# Patient Record
Sex: MALE | Race: WHITE | NOT HISPANIC OR LATINO
[De-identification: names, ages, dates, MRNs, and addresses within clinical notes are randomized per-mention and may not be internally consistent; named-entity substitution may affect disease eponyms.]

---

## 2017-02-17 ENCOUNTER — APPOINTMENT (OUTPATIENT)
Dept: NEUROLOGY | Facility: CLINIC | Age: 67
End: 2017-02-17

## 2017-02-17 VITALS
WEIGHT: 237 LBS | DIASTOLIC BLOOD PRESSURE: 72 MMHG | SYSTOLIC BLOOD PRESSURE: 140 MMHG | BODY MASS INDEX: 33.18 KG/M2 | HEIGHT: 71 IN | HEART RATE: 92 BPM

## 2017-03-17 ENCOUNTER — APPOINTMENT (OUTPATIENT)
Dept: NEUROLOGY | Facility: CLINIC | Age: 67
End: 2017-03-17

## 2017-03-17 VITALS
BODY MASS INDEX: 33.18 KG/M2 | HEIGHT: 71 IN | SYSTOLIC BLOOD PRESSURE: 130 MMHG | WEIGHT: 237 LBS | DIASTOLIC BLOOD PRESSURE: 80 MMHG | HEART RATE: 75 BPM

## 2017-04-26 ENCOUNTER — APPOINTMENT (OUTPATIENT)
Dept: NEUROLOGY | Facility: CLINIC | Age: 67
End: 2017-04-26

## 2017-04-26 VITALS
WEIGHT: 240 LBS | HEIGHT: 71 IN | HEART RATE: 90 BPM | BODY MASS INDEX: 33.6 KG/M2 | DIASTOLIC BLOOD PRESSURE: 88 MMHG | SYSTOLIC BLOOD PRESSURE: 138 MMHG

## 2017-04-27 ENCOUNTER — OTHER (OUTPATIENT)
Age: 67
End: 2017-04-27

## 2017-05-19 ENCOUNTER — APPOINTMENT (OUTPATIENT)
Dept: NEUROLOGY | Facility: CLINIC | Age: 67
End: 2017-05-19

## 2017-05-19 VITALS
BODY MASS INDEX: 33.6 KG/M2 | HEART RATE: 97 BPM | DIASTOLIC BLOOD PRESSURE: 76 MMHG | SYSTOLIC BLOOD PRESSURE: 140 MMHG | HEIGHT: 71 IN | WEIGHT: 240 LBS

## 2017-05-22 ENCOUNTER — OTHER (OUTPATIENT)
Age: 67
End: 2017-05-22

## 2017-06-09 ENCOUNTER — APPOINTMENT (OUTPATIENT)
Dept: NEUROLOGY | Facility: CLINIC | Age: 67
End: 2017-06-09

## 2017-06-09 VITALS
HEIGHT: 71 IN | WEIGHT: 240 LBS | HEART RATE: 76 BPM | BODY MASS INDEX: 33.6 KG/M2 | DIASTOLIC BLOOD PRESSURE: 83 MMHG | SYSTOLIC BLOOD PRESSURE: 179 MMHG

## 2017-06-24 ENCOUNTER — EMERGENCY (EMERGENCY)
Facility: HOSPITAL | Age: 67
LOS: 1 days | Discharge: HOME | End: 2017-06-24
Attending: EMERGENCY MEDICINE | Admitting: EMERGENCY MEDICINE
Payer: MEDICARE

## 2017-06-24 VITALS
TEMPERATURE: 97 F | DIASTOLIC BLOOD PRESSURE: 103 MMHG | HEART RATE: 85 BPM | RESPIRATION RATE: 16 BRPM | OXYGEN SATURATION: 97 % | SYSTOLIC BLOOD PRESSURE: 192 MMHG

## 2017-06-24 VITALS
OXYGEN SATURATION: 99 % | TEMPERATURE: 98 F | DIASTOLIC BLOOD PRESSURE: 109 MMHG | SYSTOLIC BLOOD PRESSURE: 188 MMHG | HEART RATE: 93 BPM | RESPIRATION RATE: 18 BRPM

## 2017-06-24 DIAGNOSIS — Z95.9 PRESENCE OF CARDIAC AND VASCULAR IMPLANT AND GRAFT, UNSPECIFIED: Chronic | ICD-10-CM

## 2017-06-24 LAB
ALBUMIN SERPL ELPH-MCNC: 4.1 G/DL — SIGNIFICANT CHANGE UP (ref 3.3–5)
ALP SERPL-CCNC: 78 U/L — SIGNIFICANT CHANGE UP (ref 40–120)
ALT FLD-CCNC: 14 U/L — SIGNIFICANT CHANGE UP (ref 4–41)
AST SERPL-CCNC: 23 U/L — SIGNIFICANT CHANGE UP (ref 4–40)
BASE EXCESS BLDV CALC-SCNC: 1.3 MMOL/L — SIGNIFICANT CHANGE UP
BASOPHILS # BLD AUTO: 0.03 K/UL — SIGNIFICANT CHANGE UP (ref 0–0.2)
BASOPHILS NFR BLD AUTO: 0.2 % — SIGNIFICANT CHANGE UP (ref 0–2)
BILIRUB SERPL-MCNC: 0.4 MG/DL — SIGNIFICANT CHANGE UP (ref 0.2–1.2)
BLOOD GAS VENOUS - CREATININE: 2.16 MG/DL — HIGH (ref 0.5–1.3)
BUN SERPL-MCNC: 40 MG/DL — HIGH (ref 7–23)
CALCIUM SERPL-MCNC: 9.8 MG/DL — SIGNIFICANT CHANGE UP (ref 8.4–10.5)
CHLORIDE BLDV-SCNC: 104 MMOL/L — SIGNIFICANT CHANGE UP (ref 96–108)
CHLORIDE SERPL-SCNC: 100 MMOL/L — SIGNIFICANT CHANGE UP (ref 98–107)
CK MB BLD-MCNC: 1.7 — SIGNIFICANT CHANGE UP (ref 0–2.5)
CK MB BLD-MCNC: 3.33 NG/ML — SIGNIFICANT CHANGE UP (ref 1–6.6)
CK SERPL-CCNC: 199 U/L — SIGNIFICANT CHANGE UP (ref 30–200)
CO2 SERPL-SCNC: 20 MMOL/L — LOW (ref 22–31)
CREAT SERPL-MCNC: 2.43 MG/DL — HIGH (ref 0.5–1.3)
EOSINOPHIL # BLD AUTO: 0.45 K/UL — SIGNIFICANT CHANGE UP (ref 0–0.5)
EOSINOPHIL NFR BLD AUTO: 3.7 % — SIGNIFICANT CHANGE UP (ref 0–6)
GAS PNL BLDV: 138 MMOL/L — SIGNIFICANT CHANGE UP (ref 136–146)
GLUCOSE BLDV-MCNC: 101 — HIGH (ref 70–99)
GLUCOSE SERPL-MCNC: 97 MG/DL — SIGNIFICANT CHANGE UP (ref 70–99)
HCO3 BLDV-SCNC: 25 MMOL/L — SIGNIFICANT CHANGE UP (ref 20–27)
HCT VFR BLD CALC: 38.8 % — LOW (ref 39–50)
HCT VFR BLDV CALC: 44.2 % — SIGNIFICANT CHANGE UP (ref 39–51)
HGB BLD-MCNC: 13.1 G/DL — SIGNIFICANT CHANGE UP (ref 13–17)
HGB BLDV-MCNC: 14.4 G/DL — SIGNIFICANT CHANGE UP (ref 13–17)
IMM GRANULOCYTES NFR BLD AUTO: 0.2 % — SIGNIFICANT CHANGE UP (ref 0–1.5)
LACTATE BLDV-MCNC: 1.2 MMOL/L — SIGNIFICANT CHANGE UP (ref 0.5–2)
LYMPHOCYTES # BLD AUTO: 28.6 % — SIGNIFICANT CHANGE UP (ref 13–44)
LYMPHOCYTES # BLD AUTO: 3.51 K/UL — HIGH (ref 1–3.3)
MCHC RBC-ENTMCNC: 31.2 PG — SIGNIFICANT CHANGE UP (ref 27–34)
MCHC RBC-ENTMCNC: 33.8 % — SIGNIFICANT CHANGE UP (ref 32–36)
MCV RBC AUTO: 92.4 FL — SIGNIFICANT CHANGE UP (ref 80–100)
MONOCYTES # BLD AUTO: 0.65 K/UL — SIGNIFICANT CHANGE UP (ref 0–0.9)
MONOCYTES NFR BLD AUTO: 5.3 % — SIGNIFICANT CHANGE UP (ref 2–14)
NEUTROPHILS # BLD AUTO: 7.62 K/UL — HIGH (ref 1.8–7.4)
NEUTROPHILS NFR BLD AUTO: 62 % — SIGNIFICANT CHANGE UP (ref 43–77)
PCO2 BLDV: 42 MMHG — SIGNIFICANT CHANGE UP (ref 41–51)
PH BLDV: 7.4 PH — SIGNIFICANT CHANGE UP (ref 7.32–7.43)
PLATELET # BLD AUTO: 273 K/UL — SIGNIFICANT CHANGE UP (ref 150–400)
PMV BLD: 11.4 FL — SIGNIFICANT CHANGE UP (ref 7–13)
PO2 BLDV: 38 MMHG — SIGNIFICANT CHANGE UP (ref 35–40)
POTASSIUM BLDV-SCNC: 3.8 MMOL/L — SIGNIFICANT CHANGE UP (ref 3.4–4.5)
POTASSIUM SERPL-MCNC: 4.1 MMOL/L — SIGNIFICANT CHANGE UP (ref 3.5–5.3)
POTASSIUM SERPL-SCNC: 4.1 MMOL/L — SIGNIFICANT CHANGE UP (ref 3.5–5.3)
PROT SERPL-MCNC: 8.2 G/DL — SIGNIFICANT CHANGE UP (ref 6–8.3)
RBC # BLD: 4.2 M/UL — SIGNIFICANT CHANGE UP (ref 4.2–5.8)
RBC # FLD: 13.2 % — SIGNIFICANT CHANGE UP (ref 10.3–14.5)
SAO2 % BLDV: 71.2 % — SIGNIFICANT CHANGE UP (ref 60–85)
SODIUM SERPL-SCNC: 138 MMOL/L — SIGNIFICANT CHANGE UP (ref 135–145)
TROPONIN T SERPL-MCNC: < 0.06 NG/ML — SIGNIFICANT CHANGE UP (ref 0–0.06)
WBC # BLD: 12.28 K/UL — HIGH (ref 3.8–10.5)
WBC # FLD AUTO: 12.28 K/UL — HIGH (ref 3.8–10.5)

## 2017-06-24 PROCEDURE — 99285 EMERGENCY DEPT VISIT HI MDM: CPT

## 2017-06-24 RX ORDER — SODIUM CHLORIDE 9 MG/ML
500 INJECTION INTRAMUSCULAR; INTRAVENOUS; SUBCUTANEOUS ONCE
Qty: 0 | Refills: 0 | Status: COMPLETED | OUTPATIENT
Start: 2017-06-24 | End: 2017-06-24

## 2017-06-24 RX ORDER — IBUPROFEN 200 MG
600 TABLET ORAL ONCE
Qty: 0 | Refills: 0 | Status: COMPLETED | OUTPATIENT
Start: 2017-06-24 | End: 2017-06-24

## 2017-06-24 RX ADMIN — SODIUM CHLORIDE 1000 MILLILITER(S): 9 INJECTION INTRAMUSCULAR; INTRAVENOUS; SUBCUTANEOUS at 20:24

## 2017-06-24 RX ADMIN — SODIUM CHLORIDE 1000 MILLILITER(S): 9 INJECTION INTRAMUSCULAR; INTRAVENOUS; SUBCUTANEOUS at 18:42

## 2017-06-24 RX ADMIN — Medication 600 MILLIGRAM(S): at 20:24

## 2017-06-24 NOTE — ED PROVIDER NOTE - PHYSICAL EXAMINATION
Tad att: nad, aaox3, perrl, eomi, cn2-12 intact, lue resting tremor, ctabl, rrr, s1s2, abd soft ntnd, neg le edema, truncal ataxia

## 2017-06-24 NOTE — ED PROVIDER NOTE - OBJECTIVE STATEMENT
66 yo M with PMH MI (stent to RCA), parkinson's disease, T2DM, HTN presenting after being seen in urgent care for episodes of shortness of breath, associated with headaches, dry mouth and diaphoresis, these episodes have occurred intermittently over the past week, patient states that he recently started seeing a motion specialist for his parkinson's, who took the patient off of sinemet and amantadine, pt is only on trihexyphenidyl currently, sinemet was not stopped abruptly. Patient has no rigidity, alterations in mental status, no fevers at home, 68 yo M with PMH MI (stent to RCA), parkinson's disease, T2DM, HTN presenting after being seen in urgent care for episodes of shortness of breath, associated with headaches, dry mouth and diaphoresis, these episodes have occurred intermittently over the past week, patient states that he recently started seeing a motion specialist for his parkinson's, who took the patient off of sinemet and amantadine, pt is only on trihexyphenidyl currently, sinemet was not stopped abruptly. Patient does describe some more difficulty walking due to feeling off balance, patient has no rigidity, alterations in mental status, no fevers at home, 66 yo M with PMH MI (stent to RCA), parkinson's disease, T2DM, HTN presenting after being seen in urgent care for episodes of shortness of breath, associated with headaches, dry mouth and diaphoresis, these episodes have occurred intermittently over the past week, patient states that he recently started seeing a motion specialist for his parkinson's, who took the patient off of sinemet and amantadine, pt is only on trihexyphenidyl currently, sinemet was not stopped abruptly. Patient does describe some more difficulty walking due to feeling off balance, patient has no rigidity, alterations in mental status, no fevers at home,     Tad att: 67M h/o htn. dm, cad mi 1 stent, parkinsons with recent medication changes sent from [facility] for lighthead, headache, dry mouth. One month ago patient's movement specialist Dr. Sultana Jean-Baptiste discontinued cinemet and amantidine in order to continue with trihexyphenidyl. Patient concerned he is having side effects of medication which he lists as headaches, diffuse, intermitt, worse with prolonged standing. Dry mouth. Generalized malaise. Feeling more offbalance, neg falls. Denies cp, sob, loc, confusion, focal arm or leg weakness or paresthesia. Denies fever, uri, cough, dysuria. Called Dr. Jean-Baptiste today, not in office, pt stopped by [facility].

## 2017-06-24 NOTE — ED PROVIDER NOTE - PROGRESS NOTE DETAILS
Tad att: Patient rec'd 500cc fluid with ha improvement. Electrolytes nl. EKG nsr nl axis neg st abnromality, will add trops and inquire further re sob and whether patient has exertional symptoms. Increasing ataxia without other neuro findings setting of amantidine and cinemet discontinuation. Will d/w neuro Tad att: Per Neuro agreed, medication side effects, keep regimen, f/up Baljeet and Estefanía. Socrates Duncan, PGY1: Pt agreed to follow up with the neurologist and will call tomorrow for an earlier appointment. Will discharge with same home meds. Received IVF, ambulating well. Dyspnea noted on triage note was when walking to [facility], resolved with rest. Denies chest pain or diaphoresis. Pt states this is his baseline with exertion. Discussed importance of staying active and improving his diet.

## 2017-06-24 NOTE — ED PROVIDER NOTE - MEDICAL DECISION MAKING DETAILS
66 yo M with PMH MI (stent to RCA), parkinson's disease, T2DM, HTN presenting after being seen in urgent care for episodes of shortness of breath, associated with headaches, dry mouth and diaphoresis, check EKG, CBC, CMP, CE,

## 2017-06-24 NOTE — ED PROVIDER NOTE - PMH
Autoimmune disease  started on prednisone on 12/23/15 for "inflammation" but does not know diagnosis  CAD (coronary artery disease)    Diabetes Mellitus, Type 2    Gastroesophageal reflux disease without esophagitis  diet controlled  Hypercholesterolemia    Hypertension    Insomnia    Leg swelling  left leg, improved after starting furosemide 3x/ week  Nocturia  x2-3  Osteoarthritis    Panic attacks    Parkinson disease    Shoulder pain, left    Urinary frequency    Urinary incontinence    Vertigo  not improved with meclizine in past

## 2017-06-24 NOTE — ED ADULT TRIAGE NOTE - CHIEF COMPLAINT QUOTE
Pt. sent in by Urgent care center for possible adverse reaction to Trihexyphenidyl. Hx of Parkinson's, recently had medication changes. Admits to head "pressure", increased shaking, bilateral weakness, and acute visual changes over the past week. Denies chest pain, fever, chills, vomiting. Pt. sent in by Urgent care center for possible adverse reaction to Trihexyphenidyl. Hx of Parkinson's, recently had medication changes. Admits to head "pressure", increased shaking, bilateral weakness, and acute visual changes over the past week, sent in to r/o extrapyramidal reaction or neuroleptic malignant syndrome. Denies chest pain, fever, chills, vomiting.

## 2017-06-24 NOTE — ED ADULT NURSE NOTE - CHIEF COMPLAINT QUOTE
Pt. sent in by Urgent care center for possible adverse reaction to Trihexyphenidyl. Hx of Parkinson's, recently had medication changes. Admits to head "pressure", increased shaking, bilateral weakness, and acute visual changes over the past week, sent in to r/o extrapyramidal reaction or neuroleptic malignant syndrome. Denies chest pain, fever, chills, vomiting.

## 2017-06-24 NOTE — ED PROVIDER NOTE - ATTENDING CONTRIBUTION TO CARE
Dr. Mishra: I have personally seen and examined this patient at the bedside. I have fully participated in the care of this patient. I have reviewed all pertinent clinical information, including history, physical exam, plan and the Resident's note and agree except as noted. HPI above as by me. PE above as by me. DDX electrolyte disturance, dehydration, medication side effect PLAN ekg ce x 1, cbc, cmp, normal saline 500 cc aliquots, dw neuro

## 2017-06-25 ENCOUNTER — EMERGENCY (EMERGENCY)
Facility: HOSPITAL | Age: 67
LOS: 1 days | Discharge: HOME | End: 2017-06-25
Attending: EMERGENCY MEDICINE | Admitting: EMERGENCY MEDICINE
Payer: COMMERCIAL

## 2017-06-25 DIAGNOSIS — Z95.9 PRESENCE OF CARDIAC AND VASCULAR IMPLANT AND GRAFT, UNSPECIFIED: Chronic | ICD-10-CM

## 2017-06-25 PROCEDURE — 99284 EMERGENCY DEPT VISIT MOD MDM: CPT | Mod: 25,GC

## 2017-06-25 PROCEDURE — 93010 ELECTROCARDIOGRAM REPORT: CPT | Mod: NC

## 2017-06-26 VITALS
DIASTOLIC BLOOD PRESSURE: 71 MMHG | RESPIRATION RATE: 18 BRPM | OXYGEN SATURATION: 100 % | HEART RATE: 92 BPM | TEMPERATURE: 98 F | SYSTOLIC BLOOD PRESSURE: 174 MMHG

## 2017-06-26 VITALS
SYSTOLIC BLOOD PRESSURE: 212 MMHG | OXYGEN SATURATION: 100 % | HEART RATE: 83 BPM | RESPIRATION RATE: 18 BRPM | DIASTOLIC BLOOD PRESSURE: 108 MMHG

## 2017-06-26 RX ORDER — DIAZEPAM 5 MG
1 TABLET ORAL
Qty: 6 | Refills: 0
Start: 2017-06-26 | End: 2017-06-29

## 2017-06-26 NOTE — ED PROVIDER NOTE - OBJECTIVE STATEMENT
67M presents to the ED with difficulty sleeping, dull ache to his head, dry mouth, mild sweating.  Pt accompanied by son to the ED.  No fever, no chills, no weakness, no chest pain, no difficulty swallowing, no change in vision. Pt was evaluated for the same yesterday in the ED and told to continue all medications - symptoms deemed to be from trihexephenydryl.  Pt has an appointment with neurologist Rebeca in September (previous neurologist Dr. Corcoran).  Pt has had side effects similar to this for many months.

## 2017-06-26 NOTE — ED PROVIDER NOTE - PROGRESS NOTE DETAILS
neurology called. arranged for outpt followup. Dr. José's office. Patient to call for urgent appointment notifying them of ER visit to expedite apptment.

## 2017-06-26 NOTE — ED ADULT TRIAGE NOTE - CHIEF COMPLAINT QUOTE
dizziness/leg pain    pt c/o increased tremors, general feeling of malaise, diff sleeping, "room is spinning",  leg pain. has hx of parkinsons...was seen in ed for same complaints...sent by urgent care for reaction to Trihexyphenidyl ... recently had changes in meds

## 2017-06-26 NOTE — ED ADULT NURSE NOTE - CHIEF COMPLAINT QUOTE
dizziness/leg pain. pt c/o increased tremors, general feeling of malaise, diff sleeping, "room is spinning",  leg pain. has hx of parkinsons...was seen in ed for same complaints...sent by urgent care for reaction to Trihexyphenidyl ... recently had changes in meds

## 2017-06-26 NOTE — ED ADULT NURSE NOTE - OBJECTIVE STATEMENT
Pt presents to ED R#9 Aox4, in NAD, c/o worsening B/L arm tremors x2 days. Has been having L arm tremors since dx with Parkinsons, RUE tremors new onset yesterday and worsening today. Also c/o worsening  LE swelling and pain, today unable to ambulate without assistance due to swelling and discomfort. Also c/o inabilitiy to sleep due to head "fullness and pressure" denies HA. States recently started on new medication trihexphenidyl, believes symptoms may be related. Hypertensive; MD aware. Denies abdominal pain/ N/V/ CP/ SOB/ fevers/ chills/ urinary complaints/ cough/ other acute complaints. Will continue to monitor.

## 2017-06-26 NOTE — ED PROVIDER NOTE - NEUROLOGICAL LEVEL OF CONSCIOUSNESS
b/l cog-wheel rigidity UE. no dystonia. no tardive. no clonus. FROM b/l LE. sensation intact. no tongue fasciculation. neck supple.

## 2017-06-26 NOTE — ED PROVIDER NOTE - PHYSICAL EXAMINATION
ATTENDING PHYSICAL EXAM DR. HERNÁNDEZ ***GEN - NAD; well appearing; A+O x3 ***HEAD - NC/AT ***EYES/NOSE - PERRL, EOMI, mucous membranes moist, no discharge   ***PULMONARY - CTA b/l, symmetric breath sounds. ***CARDIAC -s1s2, RRR, no M,G,R  ***ABDOMEN - +BS, ND, NT, soft, no guarding, no rebound, no masses   ***BACK - no CVA tenderness, Normal  spine ***EXTREMITIES - symmetric pulses, 2+ dp, capillary refill < 2 seconds, no clubbing, no cyanosis, no edema ***SKIN - no rash or bruising   ***NEUROLOGIC - alert, CN 2-12 intact, reflexes nl, sensation nl, coordination nl, finger to nose nl, motor 5/5 RUE/LUE/RLE/LLE/EHL/Plantar flexion, no pronator drift, resting tremor, no rigidity, no clonus ATTENDING PHYSICAL EXAM DR. HERNÁNDEZ ***GEN - NAD; well appearing; A+O x3 ***HEAD - NC/AT ***EYES/NOSE - PERRL, EOMI, mucous membranes moist, no discharge   ***PULMONARY - CTA b/l, symmetric breath sounds. ***CARDIAC -s1s2, RRR, no M,G,R  ***ABDOMEN - +BS, ND, NT, soft, no guarding, no rebound, no masses   ***BACK - no CVA tenderness, Normal  spine ***EXTREMITIES - symmetric pulses, 2+ dp, capillary refill < 2 seconds, no clubbing, no cyanosis, no edema ***SKIN - no rash or bruising   ***NEUROLOGIC - alert, CN 2-12 intact, reflexes nl, sensation nl, coordination nl, finger to nose nl, motor 5/5 RUE/LUE/RLE/LLE/EHL/Plantar flexion, no pronator drift, resting tremor, no severe rigidity, +cogwheel, no clonus

## 2017-06-29 ENCOUNTER — APPOINTMENT (OUTPATIENT)
Dept: NEUROLOGY | Facility: CLINIC | Age: 67
End: 2017-06-29

## 2017-06-29 DIAGNOSIS — G20 PARKINSON'S DISEASE: ICD-10-CM

## 2017-07-18 ENCOUNTER — APPOINTMENT (OUTPATIENT)
Dept: NEUROLOGY | Facility: CLINIC | Age: 67
End: 2017-07-18

## 2017-07-18 VITALS
HEART RATE: 78 BPM | SYSTOLIC BLOOD PRESSURE: 161 MMHG | DIASTOLIC BLOOD PRESSURE: 74 MMHG | HEIGHT: 71 IN | BODY MASS INDEX: 34.16 KG/M2 | WEIGHT: 244 LBS

## 2017-09-27 ENCOUNTER — MEDICATION RENEWAL (OUTPATIENT)
Age: 67
End: 2017-09-27

## 2017-10-20 ENCOUNTER — APPOINTMENT (OUTPATIENT)
Dept: NEUROLOGY | Facility: CLINIC | Age: 67
End: 2017-10-20
Payer: MEDICARE

## 2017-10-20 VITALS — DIASTOLIC BLOOD PRESSURE: 82 MMHG | HEART RATE: 78 BPM | SYSTOLIC BLOOD PRESSURE: 157 MMHG

## 2017-10-20 PROCEDURE — 99214 OFFICE O/P EST MOD 30 MIN: CPT

## 2017-10-20 RX ORDER — PAROXETINE HYDROCHLORIDE 20 MG/1
20 TABLET, FILM COATED ORAL DAILY
Qty: 90 | Refills: 1 | Status: DISCONTINUED | COMMUNITY
Start: 2017-06-29 | End: 2017-10-20

## 2018-02-21 ENCOUNTER — APPOINTMENT (OUTPATIENT)
Dept: NEUROLOGY | Facility: CLINIC | Age: 68
End: 2018-02-21
Payer: MEDICARE

## 2018-02-21 VITALS
HEART RATE: 83 BPM | BODY MASS INDEX: 34.3 KG/M2 | HEIGHT: 71 IN | DIASTOLIC BLOOD PRESSURE: 79 MMHG | SYSTOLIC BLOOD PRESSURE: 170 MMHG | WEIGHT: 245 LBS

## 2018-02-21 PROCEDURE — 99214 OFFICE O/P EST MOD 30 MIN: CPT

## 2018-05-02 ENCOUNTER — APPOINTMENT (OUTPATIENT)
Dept: NEUROLOGY | Facility: CLINIC | Age: 68
End: 2018-05-02
Payer: MEDICARE

## 2018-05-02 VITALS — HEART RATE: 83 BPM | DIASTOLIC BLOOD PRESSURE: 83 MMHG | SYSTOLIC BLOOD PRESSURE: 158 MMHG | HEIGHT: 71 IN

## 2018-05-02 PROCEDURE — 99214 OFFICE O/P EST MOD 30 MIN: CPT

## 2018-05-22 ENCOUNTER — MEDICATION RENEWAL (OUTPATIENT)
Age: 68
End: 2018-05-22

## 2018-09-28 ENCOUNTER — APPOINTMENT (OUTPATIENT)
Dept: NEUROLOGY | Facility: CLINIC | Age: 68
End: 2018-09-28
Payer: MEDICARE

## 2018-09-28 PROCEDURE — 99214 OFFICE O/P EST MOD 30 MIN: CPT

## 2018-10-01 ENCOUNTER — OTHER (OUTPATIENT)
Age: 68
End: 2018-10-01

## 2018-10-03 ENCOUNTER — FORM ENCOUNTER (OUTPATIENT)
Age: 68
End: 2018-10-03

## 2018-10-04 ENCOUNTER — APPOINTMENT (OUTPATIENT)
Dept: MRI IMAGING | Facility: HOSPITAL | Age: 68
End: 2018-10-04
Payer: MEDICARE

## 2018-10-04 ENCOUNTER — OUTPATIENT (OUTPATIENT)
Dept: OUTPATIENT SERVICES | Facility: HOSPITAL | Age: 68
LOS: 1 days | End: 2018-10-04
Payer: MEDICARE

## 2018-10-04 DIAGNOSIS — D32.9 BENIGN NEOPLASM OF MENINGES, UNSPECIFIED: ICD-10-CM

## 2018-10-04 DIAGNOSIS — Z95.9 PRESENCE OF CARDIAC AND VASCULAR IMPLANT AND GRAFT, UNSPECIFIED: Chronic | ICD-10-CM

## 2018-10-04 DIAGNOSIS — G20 PARKINSON'S DISEASE: ICD-10-CM

## 2018-10-04 PROCEDURE — 70551 MRI BRAIN STEM W/O DYE: CPT

## 2018-10-04 PROCEDURE — 70551 MRI BRAIN STEM W/O DYE: CPT | Mod: 26

## 2018-10-30 ENCOUNTER — MEDICATION RENEWAL (OUTPATIENT)
Age: 68
End: 2018-10-30

## 2019-02-05 ENCOUNTER — APPOINTMENT (OUTPATIENT)
Dept: NEUROLOGY | Facility: CLINIC | Age: 69
End: 2019-02-05
Payer: MEDICARE

## 2019-02-05 PROCEDURE — 99214 OFFICE O/P EST MOD 30 MIN: CPT

## 2019-02-05 NOTE — HISTORY OF PRESENT ILLNESS
[FreeTextEntry1] : At that visit appeared that Artane was helping and he felt better off Sinemet (which he stopped, as planned over 24 hr before the visit).  Because he would like to try to stay on one medication instead of 2, and he felt better without Sinemet.  I tried titrating up Artane and stopped Sinemet. After more discussion, it seems clear that sinemet did help\par \par 1. "Some days I am doing great" Taking sinemet 2 -2 - 2 tablet (3x/day, every 6 hours), and artane 2mg 3x/day (tried to taper off. tremor got worse), which worked well. Gets very tired about an hour after taking the meds. Feels good in the morning. No falls, uses walker\par 2. No hallucinations. Anxiety continues to be an issue, when he goes out.  He was on Paxil in the past (started in rehab) but stopped it because he did not think he needed it\par 3. Sleeps fitfully, no RBD. \par 4. Was following with neurosurgery for a meningioma, MRI in October 2018 showed stable right meningioma\par 5. Going to PT

## 2019-02-05 NOTE — DISCUSSION/SUMMARY
[FreeTextEntry1] : 1) Parkinsonism, much improved on sinemet and artane. \par 2) Anxiety and depression\par \par 1. Continue sinemet and artane. Can try using sinemet ER. \par 2. For anxiety, monitor, would recommend medications, he wants to wait. \par 3. PT/OT\par 4. Meningioma is stable, repeat MRI in 2 years

## 2019-02-05 NOTE — PHYSICAL EXAM
[Person] : oriented to person [Place] : oriented to place [Time] : oriented to time [Short Term Intact] : short term memory intact [Registration Intact] : recent registration memory intact [Motor Handedness Right-Handed] : the patient is right hand dominant [FreeTextEntry4] : 2/3 delayed recall, 3/3 with hints [FreeTextEntry1] : Improved masked face, decreased blink, voice ok. EOMI, no SWJ. Tone mildly increased in neck, and left>right arm, and left leg. Parkinsonian tremor left arm and left leg. RAHM and foot slowed on left>right. Able to get up from chair without arms. Gait with improved stride, multistep turn. Pull test negative.\par

## 2019-03-26 ENCOUNTER — MEDICATION RENEWAL (OUTPATIENT)
Age: 69
End: 2019-03-26

## 2019-06-20 ENCOUNTER — MEDICATION RENEWAL (OUTPATIENT)
Age: 69
End: 2019-06-20

## 2019-06-25 ENCOUNTER — APPOINTMENT (OUTPATIENT)
Dept: NEUROLOGY | Facility: CLINIC | Age: 69
End: 2019-06-25
Payer: MEDICARE

## 2019-06-25 VITALS — SYSTOLIC BLOOD PRESSURE: 187 MMHG | HEART RATE: 81 BPM | DIASTOLIC BLOOD PRESSURE: 73 MMHG

## 2019-06-25 VITALS — SYSTOLIC BLOOD PRESSURE: 172 MMHG | HEART RATE: 79 BPM | DIASTOLIC BLOOD PRESSURE: 82 MMHG

## 2019-06-25 PROCEDURE — 99214 OFFICE O/P EST MOD 30 MIN: CPT

## 2019-06-25 NOTE — HISTORY OF PRESENT ILLNESS
[FreeTextEntry1] : 70 yo man with PD. \par At prior visits appeared that Artane was helping and he felt better off Sinemet (which he stopped, as planned over 24 hr before the visit).  Because he would like to try to stay on one medication instead of 2, and he felt better without Sinemet.  I tried titrating up Artane and stopped Sinemet. After more discussion, it seems clear that sinemet did help\par \par 1. "Some days I am doing great" Taking sinemet 2 -2 - 2 tablet (3x/day, every 6 hours), and artane 2mg 3x/day (tried to taper off. tremor got worse), which worked well. Gets very tired about an hour after taking the meds. Did not try ER. Feels good in the morning. No falls, uses cane. No clear motor fluctuations, but different days can be different.   \par 2. No hallucinations. Anxiety not an issue according to him.  He was on Paxil in the past (started in rehab) but stopped it because he did not think he needed it\par 3. Sleeps fitfully, no RBD. \par 4. Was following with neurosurgery for a meningioma, MRI in October 2018 showed stable right meningioma\par 5. Was going to PT, ran out of visit.

## 2019-06-25 NOTE — DISCUSSION/SUMMARY
[FreeTextEntry1] : 1) Parkinsonism, much improved on sinemet and artane. \par 2) Anxiety and depression\par \par 1. Continue sinemet and artane. Trial of sinemet ER. \par 2. For anxiety, monitor, would recommend medications, he wants to wait. \par 3. PT/OT\par 4. Meningioma is stable, repeat MRI later in year\par 5. Repeat SBP was 172, advised to call PMD

## 2019-06-25 NOTE — PHYSICAL EXAM
[Person] : oriented to person [Time] : oriented to time [Place] : oriented to place [Short Term Intact] : short term memory intact [Registration Intact] : recent registration memory intact [Motor Handedness Right-Handed] : the patient is right hand dominant [FreeTextEntry4] : 2/3 delayed recall, 3/3 with hints [FreeTextEntry1] : Improved masked face, decreased blink, voice ok. EOMI, no SWJ. Tone increased in neck, and left>right arm, and left leg. Parkinsonian tremor left arm and left leg. RAHM and foot slowed on left>right (worse on left). Able to get up from chair without arms. Gait with improved stride, multistep turn. Pull test negative.\par

## 2019-07-22 ENCOUNTER — RX RENEWAL (OUTPATIENT)
Age: 69
End: 2019-07-22

## 2019-09-21 ENCOUNTER — INPATIENT (INPATIENT)
Facility: HOSPITAL | Age: 69
LOS: 3 days | Discharge: ANOTHER HOSPITAL | End: 2019-09-25
Attending: INTERNAL MEDICINE | Admitting: INTERNAL MEDICINE
Payer: MEDICARE

## 2019-09-21 VITALS
HEART RATE: 109 BPM | TEMPERATURE: 98 F | SYSTOLIC BLOOD PRESSURE: 131 MMHG | OXYGEN SATURATION: 100 % | DIASTOLIC BLOOD PRESSURE: 80 MMHG | RESPIRATION RATE: 16 BRPM

## 2019-09-21 DIAGNOSIS — Z95.9 PRESENCE OF CARDIAC AND VASCULAR IMPLANT AND GRAFT, UNSPECIFIED: Chronic | ICD-10-CM

## 2019-09-21 DIAGNOSIS — Z29.9 ENCOUNTER FOR PROPHYLACTIC MEASURES, UNSPECIFIED: ICD-10-CM

## 2019-09-21 DIAGNOSIS — R07.9 CHEST PAIN, UNSPECIFIED: ICD-10-CM

## 2019-09-21 DIAGNOSIS — E78.00 PURE HYPERCHOLESTEROLEMIA, UNSPECIFIED: ICD-10-CM

## 2019-09-21 DIAGNOSIS — I10 ESSENTIAL (PRIMARY) HYPERTENSION: ICD-10-CM

## 2019-09-21 DIAGNOSIS — E11.22 TYPE 2 DIABETES MELLITUS WITH DIABETIC CHRONIC KIDNEY DISEASE: ICD-10-CM

## 2019-09-21 DIAGNOSIS — N18.9 CHRONIC KIDNEY DISEASE, UNSPECIFIED: ICD-10-CM

## 2019-09-21 LAB
ALBUMIN SERPL ELPH-MCNC: 3.8 G/DL — SIGNIFICANT CHANGE UP (ref 3.3–5)
ALP SERPL-CCNC: 74 U/L — SIGNIFICANT CHANGE UP (ref 40–120)
ALT FLD-CCNC: < 4 U/L — LOW (ref 4–41)
ANION GAP SERPL CALC-SCNC: 14 MMO/L — SIGNIFICANT CHANGE UP (ref 7–14)
APTT BLD: 34.4 SEC — SIGNIFICANT CHANGE UP (ref 27.5–36.3)
AST SERPL-CCNC: 11 U/L — SIGNIFICANT CHANGE UP (ref 4–40)
BASOPHILS # BLD AUTO: 0.1 K/UL — SIGNIFICANT CHANGE UP (ref 0–0.2)
BASOPHILS NFR BLD AUTO: 0.8 % — SIGNIFICANT CHANGE UP (ref 0–2)
BILIRUB SERPL-MCNC: 0.3 MG/DL — SIGNIFICANT CHANGE UP (ref 0.2–1.2)
BUN SERPL-MCNC: 42 MG/DL — HIGH (ref 7–23)
CALCIUM SERPL-MCNC: 9.5 MG/DL — SIGNIFICANT CHANGE UP (ref 8.4–10.5)
CHLORIDE SERPL-SCNC: 101 MMOL/L — SIGNIFICANT CHANGE UP (ref 98–107)
CO2 SERPL-SCNC: 22 MMOL/L — SIGNIFICANT CHANGE UP (ref 22–31)
CREAT SERPL-MCNC: 2.28 MG/DL — HIGH (ref 0.5–1.3)
EOSINOPHIL # BLD AUTO: 0.53 K/UL — HIGH (ref 0–0.5)
EOSINOPHIL NFR BLD AUTO: 4.4 % — SIGNIFICANT CHANGE UP (ref 0–6)
GLUCOSE BLDC GLUCOMTR-MCNC: 102 MG/DL — HIGH (ref 70–99)
GLUCOSE SERPL-MCNC: 241 MG/DL — HIGH (ref 70–99)
HCT VFR BLD CALC: 32.9 % — LOW (ref 39–50)
HCT VFR BLD CALC: 33.5 % — LOW (ref 39–50)
HGB BLD-MCNC: 11.2 G/DL — LOW (ref 13–17)
HGB BLD-MCNC: 11.3 G/DL — LOW (ref 13–17)
IMM GRANULOCYTES NFR BLD AUTO: 0.4 % — SIGNIFICANT CHANGE UP (ref 0–1.5)
INR BLD: 1.07 — SIGNIFICANT CHANGE UP (ref 0.88–1.17)
LYMPHOCYTES # BLD AUTO: 19.4 % — SIGNIFICANT CHANGE UP (ref 13–44)
LYMPHOCYTES # BLD AUTO: 2.36 K/UL — SIGNIFICANT CHANGE UP (ref 1–3.3)
MCHC RBC-ENTMCNC: 30.5 PG — SIGNIFICANT CHANGE UP (ref 27–34)
MCHC RBC-ENTMCNC: 31 PG — SIGNIFICANT CHANGE UP (ref 27–34)
MCHC RBC-ENTMCNC: 33.4 % — SIGNIFICANT CHANGE UP (ref 32–36)
MCHC RBC-ENTMCNC: 34.3 % — SIGNIFICANT CHANGE UP (ref 32–36)
MCV RBC AUTO: 90.4 FL — SIGNIFICANT CHANGE UP (ref 80–100)
MCV RBC AUTO: 91.3 FL — SIGNIFICANT CHANGE UP (ref 80–100)
MONOCYTES # BLD AUTO: 0.84 K/UL — SIGNIFICANT CHANGE UP (ref 0–0.9)
MONOCYTES NFR BLD AUTO: 6.9 % — SIGNIFICANT CHANGE UP (ref 2–14)
NEUTROPHILS # BLD AUTO: 8.27 K/UL — HIGH (ref 1.8–7.4)
NEUTROPHILS NFR BLD AUTO: 68.1 % — SIGNIFICANT CHANGE UP (ref 43–77)
NRBC # FLD: 0 K/UL — SIGNIFICANT CHANGE UP (ref 0–0)
NRBC # FLD: 0 K/UL — SIGNIFICANT CHANGE UP (ref 0–0)
NT-PROBNP SERPL-SCNC: 370.9 PG/ML — SIGNIFICANT CHANGE UP
PLATELET # BLD AUTO: 244 K/UL — SIGNIFICANT CHANGE UP (ref 150–400)
PLATELET # BLD AUTO: 264 K/UL — SIGNIFICANT CHANGE UP (ref 150–400)
PMV BLD: 11 FL — SIGNIFICANT CHANGE UP (ref 7–13)
PMV BLD: 11.4 FL — SIGNIFICANT CHANGE UP (ref 7–13)
POTASSIUM SERPL-MCNC: 4 MMOL/L — SIGNIFICANT CHANGE UP (ref 3.5–5.3)
POTASSIUM SERPL-SCNC: 4 MMOL/L — SIGNIFICANT CHANGE UP (ref 3.5–5.3)
PROT SERPL-MCNC: 7.6 G/DL — SIGNIFICANT CHANGE UP (ref 6–8.3)
PROTHROM AB SERPL-ACNC: 11.9 SEC — SIGNIFICANT CHANGE UP (ref 9.8–13.1)
RBC # BLD: 3.64 M/UL — LOW (ref 4.2–5.8)
RBC # BLD: 3.67 M/UL — LOW (ref 4.2–5.8)
RBC # FLD: 13.1 % — SIGNIFICANT CHANGE UP (ref 10.3–14.5)
RBC # FLD: 13.2 % — SIGNIFICANT CHANGE UP (ref 10.3–14.5)
SODIUM SERPL-SCNC: 137 MMOL/L — SIGNIFICANT CHANGE UP (ref 135–145)
TROPONIN T, HIGH SENSITIVITY: 19 NG/L — SIGNIFICANT CHANGE UP (ref ?–14)
TROPONIN T, HIGH SENSITIVITY: 27 NG/L — SIGNIFICANT CHANGE UP (ref ?–14)
WBC # BLD: 10.26 K/UL — SIGNIFICANT CHANGE UP (ref 3.8–10.5)
WBC # BLD: 12.15 K/UL — HIGH (ref 3.8–10.5)
WBC # FLD AUTO: 10.26 K/UL — SIGNIFICANT CHANGE UP (ref 3.8–10.5)
WBC # FLD AUTO: 12.15 K/UL — HIGH (ref 3.8–10.5)

## 2019-09-21 PROCEDURE — 71045 X-RAY EXAM CHEST 1 VIEW: CPT | Mod: 26

## 2019-09-21 PROCEDURE — 99223 1ST HOSP IP/OBS HIGH 75: CPT

## 2019-09-21 RX ORDER — DEXTROSE 50 % IN WATER 50 %
15 SYRINGE (ML) INTRAVENOUS ONCE
Refills: 0 | Status: DISCONTINUED | OUTPATIENT
Start: 2019-09-21 | End: 2019-09-25

## 2019-09-21 RX ORDER — INSULIN GLARGINE 100 [IU]/ML
22 INJECTION, SOLUTION SUBCUTANEOUS EVERY MORNING
Refills: 0 | Status: DISCONTINUED | OUTPATIENT
Start: 2019-09-22 | End: 2019-09-25

## 2019-09-21 RX ORDER — SODIUM CHLORIDE 9 MG/ML
1000 INJECTION, SOLUTION INTRAVENOUS
Refills: 0 | Status: DISCONTINUED | OUTPATIENT
Start: 2019-09-21 | End: 2019-09-25

## 2019-09-21 RX ORDER — HEPARIN SODIUM 5000 [USP'U]/ML
6000 INJECTION INTRAVENOUS; SUBCUTANEOUS EVERY 6 HOURS
Refills: 0 | Status: DISCONTINUED | OUTPATIENT
Start: 2019-09-21 | End: 2019-09-21

## 2019-09-21 RX ORDER — SODIUM CHLORIDE 9 MG/ML
3 INJECTION INTRAMUSCULAR; INTRAVENOUS; SUBCUTANEOUS EVERY 8 HOURS
Refills: 0 | Status: DISCONTINUED | OUTPATIENT
Start: 2019-09-21 | End: 2019-09-25

## 2019-09-21 RX ORDER — PRASUGREL 5 MG/1
10 TABLET, FILM COATED ORAL DAILY
Refills: 0 | Status: DISCONTINUED | OUTPATIENT
Start: 2019-09-22 | End: 2019-09-23

## 2019-09-21 RX ORDER — HEPARIN SODIUM 5000 [USP'U]/ML
INJECTION INTRAVENOUS; SUBCUTANEOUS
Qty: 25000 | Refills: 0 | Status: DISCONTINUED | OUTPATIENT
Start: 2019-09-21 | End: 2019-09-21

## 2019-09-21 RX ORDER — DEXTROSE 50 % IN WATER 50 %
25 SYRINGE (ML) INTRAVENOUS ONCE
Refills: 0 | Status: DISCONTINUED | OUTPATIENT
Start: 2019-09-21 | End: 2019-09-25

## 2019-09-21 RX ORDER — TRIHEXYPHENIDYL HCL 2 MG
2 TABLET ORAL THREE TIMES A DAY
Refills: 0 | Status: DISCONTINUED | OUTPATIENT
Start: 2019-09-21 | End: 2019-09-25

## 2019-09-21 RX ORDER — INSULIN LISPRO 100/ML
VIAL (ML) SUBCUTANEOUS
Refills: 0 | Status: DISCONTINUED | OUTPATIENT
Start: 2019-09-21 | End: 2019-09-23

## 2019-09-21 RX ORDER — METOPROLOL TARTRATE 50 MG
12.5 TABLET ORAL
Refills: 0 | Status: DISCONTINUED | OUTPATIENT
Start: 2019-09-21 | End: 2019-09-25

## 2019-09-21 RX ORDER — LOSARTAN POTASSIUM 100 MG/1
100 TABLET, FILM COATED ORAL DAILY
Refills: 0 | Status: DISCONTINUED | OUTPATIENT
Start: 2019-09-21 | End: 2019-09-21

## 2019-09-21 RX ORDER — ATORVASTATIN CALCIUM 80 MG/1
10 TABLET, FILM COATED ORAL AT BEDTIME
Refills: 0 | Status: DISCONTINUED | OUTPATIENT
Start: 2019-09-21 | End: 2019-09-24

## 2019-09-21 RX ORDER — PANTOPRAZOLE SODIUM 20 MG/1
40 TABLET, DELAYED RELEASE ORAL
Refills: 0 | Status: DISCONTINUED | OUTPATIENT
Start: 2019-09-21 | End: 2019-09-25

## 2019-09-21 RX ORDER — ASPIRIN/CALCIUM CARB/MAGNESIUM 324 MG
81 TABLET ORAL DAILY
Refills: 0 | Status: DISCONTINUED | OUTPATIENT
Start: 2019-09-22 | End: 2019-09-25

## 2019-09-21 RX ORDER — GLUCAGON INJECTION, SOLUTION 0.5 MG/.1ML
1 INJECTION, SOLUTION SUBCUTANEOUS ONCE
Refills: 0 | Status: DISCONTINUED | OUTPATIENT
Start: 2019-09-21 | End: 2019-09-25

## 2019-09-21 RX ORDER — DEXTROSE 50 % IN WATER 50 %
12.5 SYRINGE (ML) INTRAVENOUS ONCE
Refills: 0 | Status: DISCONTINUED | OUTPATIENT
Start: 2019-09-21 | End: 2019-09-25

## 2019-09-21 RX ORDER — INSULIN LISPRO 100/ML
VIAL (ML) SUBCUTANEOUS AT BEDTIME
Refills: 0 | Status: DISCONTINUED | OUTPATIENT
Start: 2019-09-21 | End: 2019-09-25

## 2019-09-21 RX ORDER — HYDROCHLOROTHIAZIDE 25 MG
25 TABLET ORAL AT BEDTIME
Refills: 0 | Status: DISCONTINUED | OUTPATIENT
Start: 2019-09-22 | End: 2019-09-25

## 2019-09-21 RX ORDER — HYDROCHLOROTHIAZIDE 25 MG
25 TABLET ORAL ONCE
Refills: 0 | Status: COMPLETED | OUTPATIENT
Start: 2019-09-21 | End: 2019-09-21

## 2019-09-21 RX ORDER — CARBIDOPA AND LEVODOPA 25; 100 MG/1; MG/1
2 TABLET ORAL THREE TIMES A DAY
Refills: 0 | Status: DISCONTINUED | OUTPATIENT
Start: 2019-09-21 | End: 2019-09-25

## 2019-09-21 RX ORDER — HEPARIN SODIUM 5000 [USP'U]/ML
5000 INJECTION INTRAVENOUS; SUBCUTANEOUS ONCE
Refills: 0 | Status: COMPLETED | OUTPATIENT
Start: 2019-09-21 | End: 2019-09-21

## 2019-09-21 RX ADMIN — HEPARIN SODIUM 1000 UNIT(S)/HR: 5000 INJECTION INTRAVENOUS; SUBCUTANEOUS at 15:57

## 2019-09-21 RX ADMIN — ATORVASTATIN CALCIUM 10 MILLIGRAM(S): 80 TABLET, FILM COATED ORAL at 23:54

## 2019-09-21 RX ADMIN — Medication 12.5 MILLIGRAM(S): at 22:47

## 2019-09-21 RX ADMIN — Medication 25 MILLIGRAM(S): at 19:33

## 2019-09-21 RX ADMIN — HEPARIN SODIUM 5000 UNIT(S): 5000 INJECTION INTRAVENOUS; SUBCUTANEOUS at 15:57

## 2019-09-21 RX ADMIN — SODIUM CHLORIDE 3 MILLILITER(S): 9 INJECTION INTRAMUSCULAR; INTRAVENOUS; SUBCUTANEOUS at 23:47

## 2019-09-21 RX ADMIN — CARBIDOPA AND LEVODOPA 2 TABLET(S): 25; 100 TABLET ORAL at 22:48

## 2019-09-21 NOTE — H&P ADULT - ASSESSMENT
70 yo M PMHx CAD s/p stent x 1 (2010), T2DM, HTN, Parkinson's presenting with c/o 10/10 chest pain described as burning radiating from abdomen to midsternum x 1 day.

## 2019-09-21 NOTE — ED PROVIDER NOTE - OBJECTIVE STATEMENT
69M h/o CAD, DM, HTN, parkinson's presents with chest pain. Reports 10/10 crushing substernal chest pain when walking up the stairs, developed SOB and diaphoresis. No radiation. On EMS arrival, noted to have afib w RVR with a rate between 140-170s. Afib resolved without intervention. Given aspirin. Now no CP, SOB improved. Reports ongoing L leg swelling, unchanged. Acme well this morning. No recent illness, no cough or fever.  No abd pain.

## 2019-09-21 NOTE — ED ADULT NURSE NOTE - NSIMPLEMENTINTERV_GEN_ALL_ED
Implemented All Fall Risk Interventions:  [place] to call system. Call bell, personal items and telephone within reach. Instruct patient to call for assistance. Room bathroom lighting operational. Non-slip footwear when patient is off stretcher. Physically safe environment: no spills, clutter or unnecessary equipment. Stretcher in lowest position, wheels locked, appropriate side rails in place. Provide visual cue, wrist band, yellow gown, etc. Monitor gait and stability. Monitor for mental status changes and reorient to person, place, and time. Review medications for side effects contributing to fall risk. Reinforce activity limits and safety measures with patient and family.

## 2019-09-21 NOTE — ED ADULT NURSE NOTE - OBJECTIVE STATEMENT
RN Facilitator: PT received into room 1 A&Ox4, ambulatory with assistance C/O CP, SOB. As per patient and family PT was ambulating up flight of stairs had sudden onset midsternal CP, SOB, diaphoresis, dizziness. Called EMS; as per EMS PT was in AFIB with RVR with HR in 130's. Given 162 aspirin and 2 sublingual nitro by EMS prior to ED arrival with CP resolved, PT in sinus tachycardia on arrival. PT denies PMH: AFIB, fevers, chills, urinary symptoms. MD at bedside for eval: as per MD EKG concerning for STEMI: cardiology notified and Pt started on heparin as per orders. VS as noted. 20 G IV placed to L hand, labs sent. Received with 20 G IV to R ac from EMS. Family at bedside, call bell within reach, comfort measures provided. PMH: Parkinson's with tremors at baseline, CAD, HTN, HLD, DM. Report given to primary area RN

## 2019-09-21 NOTE — H&P ADULT - ATTENDING COMMENTS
Agree with above  admitted with chest pain and dyspnea  admit to tele  rule out MI  check d-dimer  cath consult noted - no urgent cath needed per consult  further workup pending above  check tte    Apple Feliz MD

## 2019-09-21 NOTE — H&P ADULT - NSHPSOCIALHISTORY_GEN_ALL_CORE
Patient lives at home with family members. Takes medication daily. Ambulates with cane/walker. Denies smoking tobacco or drinking alcohol.

## 2019-09-21 NOTE — H&P ADULT - PROBLEM SELECTOR PLAN 2
Cr 2.28  hx of Cr elevated 2.43 (2017)   will hold losartan Cr 2.28  Hx of Cr elevated in 2017--> 2.43   will hold losartan  trend bmp

## 2019-09-21 NOTE — H&P ADULT - PROBLEM SELECTOR PLAN 4
check hgb a1c  ISS  low carb diet check hgb a1c  low carb diet  patient takes Tresiba 22U in AM   patient reports taking Novolog premeal TID depending on the meal he eats  will do ISS

## 2019-09-21 NOTE — H&P ADULT - RS GEN PE MLT RESP DETAILS PC
no chest wall tenderness/no intercostal retractions/breath sounds equal/respirations non-labored/good air movement

## 2019-09-21 NOTE — H&P ADULT - NSHPLABSRESULTS_GEN_ALL_CORE
11.3   12.15 )-----------( 264      ( 21 Sep 2019 15:30 )             32.9   09-21    137  |  101  |  42<H>  ----------------------------<  241<H>  4.0   |  22  |  2.28<H>    Ca    9.5      21 Sep 2019 15:30    TPro  7.6  /  Alb  3.8  /  TBili  0.3  /  DBili  x   /  AST  11  /  ALT  < 4<L>  /  AlkPhos  74  09-21      Trop 19 --> 27     Vital Signs Last 24 Hrs  T(C): 36.6 (21 Sep 2019 16:34), Max: 36.9 (21 Sep 2019 15:32)  T(F): 97.9 (21 Sep 2019 16:34), Max: 98.4 (21 Sep 2019 15:32)  HR: 75 (21 Sep 2019 20:06) (74 - 109)  BP: 185/89 (21 Sep 2019 20:06) (131/80 - 185/89)  BP(mean): --  RR: 18 (21 Sep 2019 18:54) (16 - 18)  SpO2: 99% (21 Sep 2019 18:54) (98% - 100%)    < from: Xray Chest 1 View- PORTABLE-Urgent (09.21.19 @ 16:03) >        INTERPRETATION:  CLINICAL INDICATION: chest pain    EXAM:  Portable AP chest from 9/21/2019 at 1603. Compared to prior study from   2/22/2016.    IMPRESSION:  Left CP angle excluded from view. Sharp right CP angle. Clear remaining   visualized lungs. No pneumothorax.    Stable cardiac and mediastinal silhouettes.    Trachea midline.    Generalized mild osteopenia and mild spinal degenerative change again   noted. 11.3   12.15 )-----------( 264      ( 21 Sep 2019 15:30 )             32.9   09-21    137  |  101  |  42<H>  ----------------------------<  241<H>  4.0   |  22  |  2.28<H>    Ca    9.5      21 Sep 2019 15:30    TPro  7.6  /  Alb  3.8  /  TBili  0.3  /  DBili  x   /  AST  11  /  ALT  < 4<L>  /  AlkPhos  74  09-21    EKG: Sinus tachycardia @107bpm with 1st degree AVB 1mm VIKI lead II Ttz=204  Trop 19 --> 27     Vital Signs Last 24 Hrs  T(C): 36.6 (21 Sep 2019 16:34), Max: 36.9 (21 Sep 2019 15:32)  T(F): 97.9 (21 Sep 2019 16:34), Max: 98.4 (21 Sep 2019 15:32)  HR: 75 (21 Sep 2019 20:06) (74 - 109)  BP: 185/89 (21 Sep 2019 20:06) (131/80 - 185/89)  BP(mean): --  RR: 18 (21 Sep 2019 18:54) (16 - 18)  SpO2: 99% (21 Sep 2019 18:54) (98% - 100%)    < from: Xray Chest 1 View- PORTABLE-Urgent (09.21.19 @ 16:03) >        INTERPRETATION:  CLINICAL INDICATION: chest pain    EXAM:  Portable AP chest from 9/21/2019 at 1603. Compared to prior study from   2/22/2016.    IMPRESSION:  Left CP angle excluded from view. Sharp right CP angle. Clear remaining   visualized lungs. No pneumothorax.    Stable cardiac and mediastinal silhouettes.    Trachea midline.    Generalized mild osteopenia and mild spinal degenerative change again   noted.

## 2019-09-21 NOTE — ED PROVIDER NOTE - PROGRESS NOTE DETAILS
Starla Day MD: call placed to tele doc of day for admission. Pt evaluated by cards. Repeat trop 27 from 19.

## 2019-09-21 NOTE — H&P ADULT - PROBLEM SELECTOR PLAN 1
admit to tele   serial EKGs  trend cardiac enzymes   Trop 19 --> 27   CXR - clear   TTE ordered   Pharm NST   F/u MD note admit to tele   serial EKGs  trend cardiac enzymes   Trop 19 --> 27   CE #3 ordered   CXR - clear   TTE ordered   Pharm NST   Cards following   will d/c heparin gtt and continue home meds including aspirin/prasugrel   F/u MD note  Discussed with ZACH Menendez

## 2019-09-21 NOTE — ED ADULT TRIAGE NOTE - CHIEF COMPLAINT QUOTE
pt c/o sudden onset CP and diaphoresis. EMS states pt was a-fib on HM, pt denies PMH of irregular heart rhythms. brought directly to room #1

## 2019-09-21 NOTE — ED PROVIDER NOTE - CLINICAL SUMMARY MEDICAL DECISION MAKING FREE TEXT BOX
69M h/o CAD s/p stent, DM, HTN presents with CP. EKG w VIKI III, ST depression avL and 1, STEMI consult activated, spoke w interventionalist, EKG does not meet criteria for cath at this time and patient is currently CP free. Will give heparin for ACS and episode of afib. Plan for labs, CXR. Will admit to tele.

## 2019-09-21 NOTE — H&P ADULT - NSICDXPASTMEDICALHX_GEN_ALL_CORE_FT
PAST MEDICAL HISTORY:  Autoimmune disease started on prednisone on 12/23/15 for "inflammation" but does not know diagnosis    CAD (coronary artery disease) s/p 1 stent in 2010    Diabetes Mellitus, Type 2     Gastroesophageal reflux disease without esophagitis diet controlled    Hypercholesterolemia     Hypertension     Insomnia     Leg swelling left leg, improved after starting furosemide 3x/ week    Nocturia x2-3    Osteoarthritis     Panic attacks     Parkinson disease     Shoulder pain, left     Urinary frequency     Urinary incontinence     Vertigo not improved with meclizine in past PAST MEDICAL HISTORY:  Autoimmune disease started on prednisone on 12/23/15 for "inflammation" but does not know diagnosis    CAD (coronary artery disease) s/p 1 stent in 2010    Diabetes Mellitus, Type 2     Gastroesophageal reflux disease without esophagitis diet controlled    Hypercholesterolemia     Hypertension     Insomnia     Leg swelling left leg    Nocturia x2-3    Osteoarthritis     Panic attacks     Parkinson disease     Shoulder pain, left     Urinary frequency     Urinary incontinence     Vertigo not improved with meclizine in past

## 2019-09-21 NOTE — H&P ADULT - NSICDXPASTSURGICALHX_GEN_ALL_CORE_FT
PAST SURGICAL HISTORY:  S/P angioplasty with stent 1 stent, RCA    Status Post Cardiac Catheterization

## 2019-09-21 NOTE — ED ADULT NURSE REASSESSMENT NOTE - NS ED NURSE REASSESS COMMENT FT1
pt A+OX4. breathing unlabored. denies any chest pain, sob, dizziness or other complaints. states "I just feel tired." heparin drip running as ordered. awaits cardiology consult in nad with family at bedside.

## 2019-09-21 NOTE — H&P ADULT - HISTORY OF PRESENT ILLNESS
70 yo M PMHx CAD s/p stent x 1 (2010), T2DM, HTN, Parkinson's presenting with c/o 10/10 chest pain described as burning radiating from abdomen to midsternum x 1 day. Patient reports symptoms started at 2 pm and resolved after 1 hour. Symptoms began after he ate lunch and was walking up a flight of stairs. He reports chest pain was associated with dyspnea, diaphoresis, and nausea. At baseline, he ambulates with cane/walker. Takes medications daily including prasugrel. On ROS, reports chronic left leg swelling x 1-2 years.     In ED, Vitals: Temp: 97.9, HR: 90, Bp: 154/87, Sp02: 98% on NC. Trop 19-->27. In ED he was started on Heparin gtt for EKG which showed slight VIKI lead II,III. Cardiology was consulted and recommended echo, nst, and no need for heparin gtt currently. Patient denies chest pain at this time 70 yo M PMHx CAD s/p stent x 1 (2010), T2DM, HTN, Parkinson's presenting with c/o 10/10 chest pain described as burning radiating from abdomen to midsternum x 1 day. Patient reports symptoms started at 2 pm and resolved after 1 hour. Chest pain began after he ate lunch and was walking up a flight of stairs. He reports chest pain was associated with dyspnea, diaphoresis, and nausea. At baseline, he ambulates with cane/walker. Takes medications daily including aspirin and prasugrel. On ROS, reports chronic left leg swelling x 1-2 years.    In ED, Vitals: Temp: 97.9, HR: 90, Bp: 154/87, Sp02: 98% on NC. Trop 19-->27. In ED he was started on Heparin gtt for EKG which showed slight VIKI lead II,III. Cardiology was consulted and recommended echo, nst, and continue home meds. No need for heparin gtt currently. Patient denies chest pain at this time

## 2019-09-21 NOTE — H&P ADULT - PROBLEM SELECTOR PLAN 3
monitor bp  bp elevated   w monitor bp  bp elevated   184/99 asx   will start low dose lopressor 12.mg q12   DASH diet

## 2019-09-21 NOTE — H&P ADULT - NSICDXFAMILYHX_GEN_ALL_CORE_FT
FAMILY HISTORY:  Family history of hypertension  Family history of malaria    Sibling  Still living? No  Family history of pulmonary fibrosis, Age at diagnosis: Age Unknown

## 2019-09-21 NOTE — CONSULT NOTE ADULT - SUBJECTIVE AND OBJECTIVE BOX
Date of Admission:  9/21/19  CHIEF COMPLAINT:  chest pain  HISTORY OF PRESENT ILLNESS:  69M h/o CAD, DM, HTN, Parkinson's   Allergies    adhesives (Rash)  latex (Urticaria)  lisinopril (Unknown)  lisinopril (Unknown)    Intolerances    	    MEDICATIONS:  heparin  Infusion.  Unit(s)/Hr IV Continuous <Continuous>  heparin  Injectable 6000 Unit(s) IV Push every 6 hours PRN                  PAST MEDICAL & SURGICAL HISTORY:  Shoulder pain, left  Osteoarthritis  Panic attacks  Urinary frequency  Urinary incontinence  Nocturia: x2-3  Gastroesophageal reflux disease without esophagitis: diet controlled  Vertigo: not improved with meclizine in past  Insomnia  Autoimmune disease: started on prednisone on 12/23/15 for &quot;inflammation&quot; but does not know diagnosis  Leg swelling: left leg, improved after starting furosemide 3x/ week  CAD (coronary artery disease)  Parkinson disease  Hypercholesterolemia  Diabetes Mellitus, Type 2  Hypertension  S/P angioplasty with stent: 1 stent, RCA  Status Post Cardiac Catheterization      FAMILY HISTORY:  Family history of pulmonary fibrosis (Sibling)  Family history of malaria (Mother)  Family history of hypertension (Father)      SOCIAL HISTORY:    [ ] Non-smoker  [ ] Smoker  [ ] Alcohol      REVIEW OF SYSTEMS:  See HPI. Otherwise, 10 point ROS done and otherwise negative.      T(C): 36.6 (09-21-19 @ 16:34), Max: 36.9 (09-21-19 @ 15:32)  HR: 90 (09-21-19 @ 16:34) (90 - 109)  BP: 154/87 (09-21-19 @ 16:34) (131/80 - 154/87)  RR: 18 (09-21-19 @ 16:34) (16 - 18)  SpO2: 98% (09-21-19 @ 16:34) (98% - 100%)  Wt(kg): --  I&O's Summary      Physical Exam:  General: NAD  Cardiovascular: Normal S1 S2, No JVD, No murmurs, No edema  Respiratory: Lungs clear to auscultation	  Gastrointestinal:  Soft, Non-tender, + BS	  Skin: warm and dry, No rashes, No ecchymoses, No cyanosis	  Extremities:  No clubbing, cyanosis or edema  Vascular: Peripheral pulses palpable 2+ bilaterally    LABS:	   	    CBC Full  -  ( 21 Sep 2019 15:30 )  WBC Count : 12.15 K/uL  Hemoglobin : 11.3 g/dL  Hematocrit : 32.9 %  Platelet Count - Automated : 264 K/uL  Mean Cell Volume : 90.4 fL  Mean Cell Hemoglobin : 31.0 pg  Mean Cell Hemoglobin Concentration : 34.3 %  Auto Neutrophil # : 8.27 K/uL  Auto Lymphocyte # : 2.36 K/uL  Auto Monocyte # : 0.84 K/uL  Auto Eosinophil # : 0.53 K/uL  Auto Basophil # : 0.10 K/uL  Auto Neutrophil % : 68.1 %  Auto Lymphocyte % : 19.4 %  Auto Monocyte % : 6.9 %  Auto Eosinophil % : 4.4 %  Auto Basophil % : 0.8 %    09-21    137  |  101  |  42<H>  ----------------------------<  241<H>  4.0   |  22  |  2.28<H>    Ca    9.5      21 Sep 2019 15:30    TPro  7.6  /  Alb  3.8  /  TBili  0.3  /  DBili  x   /  AST  11  /  ALT  < 4<L>  /  AlkPhos  74  09-21      proBNP: Serum Pro-Brain Natriuretic Peptide: 370.9 pg/mL (09-21 @ 15:30)    Lipid Profile:   HgA1c:   TSH:       CARDIAC MARKERS:            TELEMETRY: 	    ECG:  	  RADIOLOGY:  OTHER: 	    PREVIOUS DIAGNOSTIC TESTING:    [ ] Echocardiogram:  [ ]  Catheterization:  [ ] Stress Test:  	  	  ASSESSMENT/PLAN: 	    [place]is G[place] [id number] Date of Admission:  9/21/19  CHIEF COMPLAINT:  chest pain  HISTORY OF PRESENT ILLNESS:  69M h/o CAD with stents, DM, HTN, Parkinson's presents with brief episode of chest pain that was 10/10. Patient describes he took his cardiac meds just before lunch, and he had an appt. He had to wait in his car, the sun was beating down on him, and when he got out, he was hot and sweaty. He then had to climb stairs. He felt the chest pain while climbing the stairs and got a bit light headed. He sat down and the chest pain went away. He reports he has no further episodes of chest pain. He denies NVD. No recent illness.  Allergies    adhesives (Rash)  latex (Urticaria)  lisinopril (Unknown)  lisinopril (Unknown)    Intolerances  HOME MEDICATIONS:  Valium 5 mg oral tablet: 1 tab(s) orally 2 times a day for your parkinson MDD:2  · 	enoxaparin: 30 milligram(s) subcutaneously once a day. stop if bleeding persists. monitor BUN/Cr for dosing. stop date 2/5/16  · 	Cepacol Sore Throat 15 mg-3.6 mg mucous membrane lozenge: 1 cap(s) mucous membrane every 2 to 3 hours, As Needed for sore throat  · 	insulin lispro 100 units/mL subcutaneous solution:  subcutaneous 3 times a day (before meals), 1 Unit(s) if Glucose 151 - 200  	2 Unit(s) if Glucose 201 - 250  	3 Unit(s) if Glucose 251 - 300  	4 Unit(s) if Glucose 301 - 350  	5 Unit(s) if Glucose 351 - 400  	6 Unit(s) if Glucose Greater Than 400  · 	aspirin 81 mg oral tablet, chewable: 1 tab(s) orally once a day  · 	ocular lubricant ophthalmic solution: 1 drop(s) to each affected eye 4 times a day, As needed, Dry Eyes  · 	oxyCODONE 10 mg oral tablet: 1 tab(s) orally every 3 hours, As needed, Moderate Pain  · 	oxyCODONE 5 mg oral tablet: 1 tab(s) orally every 3 hours, As needed, Mild Pain  · 	tamsulosin 0.4 mg oral capsule: 1 cap(s) orally once a day (at bedtime)  · 	pantoprazole 40 mg oral delayed release tablet: 1 tab(s) orally once a day (before a meal)  · 	Lantus 100 units/mL subcutaneous solution: 20  subcutaneous once a day (at bedtime)  · 	Effient 10 mg oral tablet: 1 tab(s) orally once a day (in the evening)  · 	carbidopa-levodopa 25 mg-100 mg oral tablet: 1 tab(s) orally 4 times a day  · 	predniSONE 5 mg oral tablet: 1 tab(s) orally 2 times a day (with meals)  · 	Fish Oil 1000 mg oral capsule: 1 cap(s) orally 3 times a day  · 	Vitamin D3 1000 intl units oral capsule: 1 cap(s) orally once a day  · 	Vitamin C 1000 mg oral tablet: 1 tab(s) orally once a day  · 	Xanax 0.5 mg oral tablet: 1 tab(s) orally once a day, As Needed - for anxiety	    MEDICATIONS:  heparin  Infusion.  Unit(s)/Hr IV Continuous <Continuous>  heparin  Injectable 6000 Unit(s) IV Push every 6 hours PRN                  PAST MEDICAL & SURGICAL HISTORY:  Shoulder pain, left  Osteoarthritis  Panic attacks  Urinary frequency  Urinary incontinence  Nocturia: x2-3  Gastroesophageal reflux disease without esophagitis: diet controlled  Vertigo: not improved with meclizine in past  Insomnia  Autoimmune disease: started on prednisone on 12/23/15 for &quot;inflammation&quot; but does not know diagnosis  Leg swelling: left leg, improved after starting furosemide 3x/ week  CAD (coronary artery disease)  Parkinson disease  Hypercholesterolemia  Diabetes Mellitus, Type 2  Hypertension  S/P angioplasty with stent: 1 stent, RCA  Status Post Cardiac Catheterization      FAMILY HISTORY:  Family history of pulmonary fibrosis (Sibling)  Family history of malaria (Mother)  Family history of hypertension (Father)      SOCIAL HISTORY:    [ ] Non-smoker  [ ] Smoker  [ ] Alcohol      REVIEW OF SYSTEMS:    CONSTITUTIONAL: shakey from Parkinsons  EYES/ENT: No visual changes;  No vertigo or throat pain   NECK: No pain or stiffness  RESPIRATORY: denies sob  CARDIOVASCULAR: endorses episodic chest pain now resolved  GASTROINTESTINAL: No abdominal or epigastric pain. No nausea, vomiting, or hematemesis; No diarrhea or constipation. No melena or hematochezia.  GENITOURINARY: No dysuria, frequency or hematuria  NEUROLOGICAL: No numbness or weakness  SKIN: No itching, rashes      T(C): 36.6 (09-21-19 @ 16:34), Max: 36.9 (09-21-19 @ 15:32)  HR: 90 (09-21-19 @ 16:34) (90 - 109)  BP: 154/87 (09-21-19 @ 16:34) (131/80 - 154/87)  RR: 18 (09-21-19 @ 16:34) (16 - 18)  SpO2: 98% (09-21-19 @ 16:34) (98% - 100%)  Wt(kg): --  I&O's Summary      Physical Exam:  General: NAD  Cardiovascular: Normal S1 S2, No JVD, No murmurs, No edema  Respiratory: Lungs clear to auscultation	  Gastrointestinal:  Soft, Non-tender, + BS	  Skin: warm and dry, No rashes, No ecchymoses, No cyanosis	  Extremities:  No clubbing, cyanosis or edema  Vascular: Peripheral pulses palpable 2+ bilaterally    LABS:	   	    CBC Full  -  ( 21 Sep 2019 15:30 )  WBC Count : 12.15 K/uL  Hemoglobin : 11.3 g/dL  Hematocrit : 32.9 %  Platelet Count - Automated : 264 K/uL  Mean Cell Volume : 90.4 fL  Mean Cell Hemoglobin : 31.0 pg  Mean Cell Hemoglobin Concentration : 34.3 %  Auto Neutrophil # : 8.27 K/uL  Auto Lymphocyte # : 2.36 K/uL  Auto Monocyte # : 0.84 K/uL  Auto Eosinophil # : 0.53 K/uL  Auto Basophil # : 0.10 K/uL  Auto Neutrophil % : 68.1 %  Auto Lymphocyte % : 19.4 %  Auto Monocyte % : 6.9 %  Auto Eosinophil % : 4.4 %  Auto Basophil % : 0.8 %    09-21    137  |  101  |  42<H>  ----------------------------<  241<H>  4.0   |  22  |  2.28<H>    Ca    9.5      21 Sep 2019 15:30    TPro  7.6  /  Alb  3.8  /  TBili  0.3  /  DBili  x   /  AST  11  /  ALT  < 4<L>  /  AlkPhos  74  09-21      proBNP: Serum Pro-Brain Natriuretic Peptide: 370.9 pg/mL (09-21 @ 15:30)    EKG: NSR with slight elevation II and III, unchanged from previous EKG in 2017  TELE: NSR

## 2019-09-22 LAB
ANION GAP SERPL CALC-SCNC: 19 MMO/L — HIGH (ref 7–14)
APPEARANCE UR: CLEAR — SIGNIFICANT CHANGE UP
APTT BLD: 23.1 SEC — LOW (ref 27.5–36.3)
BACTERIA # UR AUTO: NEGATIVE — SIGNIFICANT CHANGE UP
BILIRUB UR-MCNC: NEGATIVE — SIGNIFICANT CHANGE UP
BLOOD UR QL VISUAL: NEGATIVE — SIGNIFICANT CHANGE UP
BUN SERPL-MCNC: 39 MG/DL — HIGH (ref 7–23)
CALCIUM SERPL-MCNC: 9.5 MG/DL — SIGNIFICANT CHANGE UP (ref 8.4–10.5)
CHLORIDE SERPL-SCNC: 98 MMOL/L — SIGNIFICANT CHANGE UP (ref 98–107)
CHOLEST SERPL-MCNC: 152 MG/DL — SIGNIFICANT CHANGE UP (ref 120–199)
CK MB BLD-MCNC: 4.16 NG/ML — SIGNIFICANT CHANGE UP (ref 1–6.6)
CK SERPL-CCNC: 101 U/L — SIGNIFICANT CHANGE UP (ref 30–200)
CO2 SERPL-SCNC: 22 MMOL/L — SIGNIFICANT CHANGE UP (ref 22–31)
COLOR SPEC: SIGNIFICANT CHANGE UP
CREAT ?TM UR-MCNC: 44.8 MG/DL — SIGNIFICANT CHANGE UP
CREAT SERPL-MCNC: 2.14 MG/DL — HIGH (ref 0.5–1.3)
D DIMER BLD IA.RAPID-MCNC: 342 NG/ML — SIGNIFICANT CHANGE UP
GLUCOSE BLDC GLUCOMTR-MCNC: 134 MG/DL — HIGH (ref 70–99)
GLUCOSE BLDC GLUCOMTR-MCNC: 143 MG/DL — HIGH (ref 70–99)
GLUCOSE BLDC GLUCOMTR-MCNC: 175 MG/DL — HIGH (ref 70–99)
GLUCOSE BLDC GLUCOMTR-MCNC: 93 MG/DL — SIGNIFICANT CHANGE UP (ref 70–99)
GLUCOSE SERPL-MCNC: 147 MG/DL — HIGH (ref 70–99)
GLUCOSE UR-MCNC: NEGATIVE — SIGNIFICANT CHANGE UP
HBA1C BLD-MCNC: 7 % — HIGH (ref 4–5.6)
HCT VFR BLD CALC: 32.6 % — LOW (ref 39–50)
HCV AB S/CO SERPL IA: 0.22 S/CO — SIGNIFICANT CHANGE UP (ref 0–0.99)
HCV AB SERPL-IMP: SIGNIFICANT CHANGE UP
HDLC SERPL-MCNC: 38 MG/DL — SIGNIFICANT CHANGE UP (ref 35–55)
HGB BLD-MCNC: 10.9 G/DL — LOW (ref 13–17)
HYALINE CASTS # UR AUTO: NEGATIVE — SIGNIFICANT CHANGE UP
KETONES UR-MCNC: NEGATIVE — SIGNIFICANT CHANGE UP
LEUKOCYTE ESTERASE UR-ACNC: NEGATIVE — SIGNIFICANT CHANGE UP
LIPID PNL WITH DIRECT LDL SERPL: 113 MG/DL — SIGNIFICANT CHANGE UP
MAGNESIUM SERPL-MCNC: 1.8 MG/DL — SIGNIFICANT CHANGE UP (ref 1.6–2.6)
MCHC RBC-ENTMCNC: 30.7 PG — SIGNIFICANT CHANGE UP (ref 27–34)
MCHC RBC-ENTMCNC: 33.4 % — SIGNIFICANT CHANGE UP (ref 32–36)
MCV RBC AUTO: 91.8 FL — SIGNIFICANT CHANGE UP (ref 80–100)
NITRITE UR-MCNC: NEGATIVE — SIGNIFICANT CHANGE UP
NRBC # FLD: 0 K/UL — SIGNIFICANT CHANGE UP (ref 0–0)
PH UR: 6.5 — SIGNIFICANT CHANGE UP (ref 5–8)
PHOSPHATE SERPL-MCNC: 3.4 MG/DL — SIGNIFICANT CHANGE UP (ref 2.5–4.5)
PLATELET # BLD AUTO: 237 K/UL — SIGNIFICANT CHANGE UP (ref 150–400)
PMV BLD: 10.9 FL — SIGNIFICANT CHANGE UP (ref 7–13)
POTASSIUM SERPL-MCNC: 4 MMOL/L — SIGNIFICANT CHANGE UP (ref 3.5–5.3)
POTASSIUM SERPL-SCNC: 4 MMOL/L — SIGNIFICANT CHANGE UP (ref 3.5–5.3)
PROT UR-MCNC: 100 — HIGH
PROT UR-MCNC: 116.3 MG/DL — SIGNIFICANT CHANGE UP
RBC # BLD: 3.55 M/UL — LOW (ref 4.2–5.8)
RBC # FLD: 13.2 % — SIGNIFICANT CHANGE UP (ref 10.3–14.5)
RBC CASTS # UR COMP ASSIST: SIGNIFICANT CHANGE UP (ref 0–?)
SODIUM SERPL-SCNC: 139 MMOL/L — SIGNIFICANT CHANGE UP (ref 135–145)
SP GR SPEC: 1.01 — SIGNIFICANT CHANGE UP (ref 1–1.04)
SQUAMOUS # UR AUTO: SIGNIFICANT CHANGE UP
TRIGL SERPL-MCNC: 75 MG/DL — SIGNIFICANT CHANGE UP (ref 10–149)
TROPONIN T, HIGH SENSITIVITY: 102 NG/L — CRITICAL HIGH (ref ?–14)
TROPONIN T, HIGH SENSITIVITY: 84 NG/L — CRITICAL HIGH (ref ?–14)
TSH SERPL-MCNC: 4.96 UIU/ML — HIGH (ref 0.27–4.2)
UROBILINOGEN FLD QL: NORMAL — SIGNIFICANT CHANGE UP
WBC # BLD: 9.59 K/UL — SIGNIFICANT CHANGE UP (ref 3.8–10.5)
WBC # FLD AUTO: 9.59 K/UL — SIGNIFICANT CHANGE UP (ref 3.8–10.5)
WBC UR QL: SIGNIFICANT CHANGE UP (ref 0–?)

## 2019-09-22 PROCEDURE — 76770 US EXAM ABDO BACK WALL COMP: CPT | Mod: 26

## 2019-09-22 PROCEDURE — 70450 CT HEAD/BRAIN W/O DYE: CPT | Mod: 26

## 2019-09-22 PROCEDURE — 93970 EXTREMITY STUDY: CPT | Mod: 26

## 2019-09-22 PROCEDURE — 93306 TTE W/DOPPLER COMPLETE: CPT | Mod: 26

## 2019-09-22 RX ORDER — AMLODIPINE BESYLATE 2.5 MG/1
2.5 TABLET ORAL DAILY
Refills: 0 | Status: DISCONTINUED | OUTPATIENT
Start: 2019-09-22 | End: 2019-09-25

## 2019-09-22 RX ORDER — HEPARIN SODIUM 5000 [USP'U]/ML
4000 INJECTION INTRAVENOUS; SUBCUTANEOUS EVERY 6 HOURS
Refills: 0 | Status: DISCONTINUED | OUTPATIENT
Start: 2019-09-22 | End: 2019-09-23

## 2019-09-22 RX ORDER — SODIUM CHLORIDE 9 MG/ML
1000 INJECTION INTRAMUSCULAR; INTRAVENOUS; SUBCUTANEOUS
Refills: 0 | Status: COMPLETED | OUTPATIENT
Start: 2019-09-23 | End: 2019-09-23

## 2019-09-22 RX ORDER — INFLUENZA VIRUS VACCINE 15; 15; 15; 15 UG/.5ML; UG/.5ML; UG/.5ML; UG/.5ML
0.5 SUSPENSION INTRAMUSCULAR ONCE
Refills: 0 | Status: DISCONTINUED | OUTPATIENT
Start: 2019-09-22 | End: 2019-09-25

## 2019-09-22 RX ORDER — HEPARIN SODIUM 5000 [USP'U]/ML
INJECTION INTRAVENOUS; SUBCUTANEOUS
Qty: 25000 | Refills: 0 | Status: DISCONTINUED | OUTPATIENT
Start: 2019-09-22 | End: 2019-09-23

## 2019-09-22 RX ADMIN — SODIUM CHLORIDE 3 MILLILITER(S): 9 INJECTION INTRAMUSCULAR; INTRAVENOUS; SUBCUTANEOUS at 13:15

## 2019-09-22 RX ADMIN — Medication 12.5 MILLIGRAM(S): at 21:14

## 2019-09-22 RX ADMIN — Medication 2 MILLIGRAM(S): at 21:15

## 2019-09-22 RX ADMIN — AMLODIPINE BESYLATE 2.5 MILLIGRAM(S): 2.5 TABLET ORAL at 13:19

## 2019-09-22 RX ADMIN — Medication 81 MILLIGRAM(S): at 13:19

## 2019-09-22 RX ADMIN — HEPARIN SODIUM 1000 UNIT(S)/HR: 5000 INJECTION INTRAVENOUS; SUBCUTANEOUS at 23:20

## 2019-09-22 RX ADMIN — CARBIDOPA AND LEVODOPA 2 TABLET(S): 25; 100 TABLET ORAL at 13:19

## 2019-09-22 RX ADMIN — Medication 2 MILLIGRAM(S): at 01:36

## 2019-09-22 RX ADMIN — Medication 2 MILLIGRAM(S): at 13:19

## 2019-09-22 RX ADMIN — INSULIN GLARGINE 22 UNIT(S): 100 INJECTION, SOLUTION SUBCUTANEOUS at 07:52

## 2019-09-22 RX ADMIN — Medication 2 MILLIGRAM(S): at 05:09

## 2019-09-22 RX ADMIN — PRASUGREL 10 MILLIGRAM(S): 5 TABLET, FILM COATED ORAL at 21:14

## 2019-09-22 RX ADMIN — Medication 12.5 MILLIGRAM(S): at 12:20

## 2019-09-22 RX ADMIN — PANTOPRAZOLE SODIUM 40 MILLIGRAM(S): 20 TABLET, DELAYED RELEASE ORAL at 05:09

## 2019-09-22 RX ADMIN — CARBIDOPA AND LEVODOPA 2 TABLET(S): 25; 100 TABLET ORAL at 05:09

## 2019-09-22 RX ADMIN — CARBIDOPA AND LEVODOPA 2 TABLET(S): 25; 100 TABLET ORAL at 21:14

## 2019-09-22 RX ADMIN — SODIUM CHLORIDE 3 MILLILITER(S): 9 INJECTION INTRAMUSCULAR; INTRAVENOUS; SUBCUTANEOUS at 21:16

## 2019-09-22 RX ADMIN — Medication 2: at 07:52

## 2019-09-22 RX ADMIN — ATORVASTATIN CALCIUM 10 MILLIGRAM(S): 80 TABLET, FILM COATED ORAL at 21:14

## 2019-09-22 RX ADMIN — Medication 25 MILLIGRAM(S): at 21:14

## 2019-09-22 RX ADMIN — SODIUM CHLORIDE 3 MILLILITER(S): 9 INJECTION INTRAMUSCULAR; INTRAVENOUS; SUBCUTANEOUS at 05:11

## 2019-09-22 NOTE — PROVIDER CONTACT NOTE (CRITICAL VALUE NOTIFICATION) - SITUATION
69 year old male admitted 9/21 to the ED for chest pain. Transferred to . Troponin, 84. Pt denies chest pain and is aymptomatic at this time.

## 2019-09-22 NOTE — PROVIDER CONTACT NOTE (CRITICAL VALUE NOTIFICATION) - BACKGROUND
69 year old male, PMH hypertension, type 2 diabetes, CAD. Pt admitted to ED with chest pain and SOB, pt was found to be in rapid a. fib but converted to NSR on own. Heparin gtt initiated in ED for new onset a. fib but was d/c per orders. Pt maintaining at NSR. Pt admitted to [facility] and denies any chest pain or SOB at this time. Pt pending NST and echo.

## 2019-09-22 NOTE — CONSULT NOTE ADULT - SUBJECTIVE AND OBJECTIVE BOX
HISTORY OF PRESENT ILLNESS: 68 yo M PMHx CAD s/p stent x 1 (2010), T2DM, HTN, Parkinson's presenting with c/o 10/10 chest pain described as burning radiating from abdomen to midsternum x 1 day. Patient reports symptoms started at 2 pm and resolved after 1 hour. Chest pain began after he ate lunch and was walking up a flight of stairs. He reports chest pain was associated with dyspnea, diaphoresis, and nausea. At baseline, he ambulates with cane/walker. Takes medications daily including aspirin and prasugrel. On ROS, reports chronic left leg swelling x 1-2 years.      PAST MEDICAL & SURGICAL HISTORY:  Shoulder pain, left  Osteoarthritis  Panic attacks  Urinary frequency  Urinary incontinence  Nocturia: x2-3  Gastroesophageal reflux disease without esophagitis: diet controlled  Vertigo: not improved with meclizine in past  Insomnia  Autoimmune disease: started on prednisone on 12/23/15 for &quot;inflammation&quot; but does not know diagnosis  Leg swelling: left leg  CAD (coronary artery disease): s/p 1 stent in 2010  Parkinson disease  Hypercholesterolemia  Diabetes Mellitus, Type 2  Hypertension  S/P angioplasty with stent: 1 stent, RCA  Status Post Cardiac Catheterization      PREVIOUS DIAGNOSTIC TESTING:    [ ] Echocardiogram: < from: Transthoracic Echocardiogram (12.27.15 @ 08:24) >  ------------------------------------------------------------------------  Conclusions:  1. Normal mitral valve. Minimal mitral regurgitation.  2. Normal trileaflet aortic valve with normal opening. No  aortic valve regurgitation seen.  3. Increased relative wall thickness with normal left  ventricular mass index, consistent with concentric left  ventricular remodeling.  4. Endocardium not well visualized; grossly normal left  ventricular systolic function. Consider repeat imaging with  intravenous echo contrast if clinically indicated.  5. Mild diastolic dysfunction (Stage I).  6. Normal right ventricular size and function.  *** Compared with echocardiogram of 8/27/2010, an apical  wall motion abnormality was seen on the previous study with  intravenous echo contrast. This was not seen today but the  endocardium was not well visualized.  ------------------------------------------------------------------------  Confirmed on  12/27/2015 - 12:48:29 by Sabina Davis MD  ------------------------------------------------------------------------    < end of copied text >    [ ]  Catheterization: < from: Cardiac Cath Lab (08.26.10 @ 13:28) >  Coronary vessels: The coronary circulation is right dominant.  LM:      LM: Normal.  LAD:      Mid LAD: Angiography showed minor luminal irregularities with no  flow limiting lesions.  CX:      Circumflex: Angiography showed minor luminal irregularities with  no flow limiting lesions.  RCA:      Mid RCA: There was a tubular 99 % stenosis. The lesion was  complex.  Complications: There were no complications.  Recommendations:  Add aspirin, 325 mg, PO, every day.  Add Prasugrel, 10 mg, PO, every day.  Prepared and signed by  Lito Slaughter M.D.    < end of copied text >    [ ] Stress Test:  	    MEDICATIONS:  aspirin  chewable 81 milliGRAM(s) Oral daily  hydrochlorothiazide 25 milliGRAM(s) Oral at bedtime  metoprolol tartrate 12.5 milliGRAM(s) Oral two times a day  prasugrel 10 milliGRAM(s) Oral daily        carbidopa/levodopa  25/100 2 Tablet(s) Oral three times a day  trihexyphenidyl 2 milliGRAM(s) Oral three times a day    pantoprazole    Tablet 40 milliGRAM(s) Oral before breakfast    atorvastatin 10 milliGRAM(s) Oral at bedtime  dextrose 40% Gel 15 Gram(s) Oral once PRN  dextrose 50% Injectable 12.5 Gram(s) IV Push once  dextrose 50% Injectable 25 Gram(s) IV Push once  dextrose 50% Injectable 25 Gram(s) IV Push once  glucagon  Injectable 1 milliGRAM(s) IntraMuscular once PRN  insulin glargine Injectable (LANTUS) 22 Unit(s) SubCutaneous every morning  insulin lispro (HumaLOG) corrective regimen sliding scale   SubCutaneous three times a day before meals  insulin lispro (HumaLOG) corrective regimen sliding scale   SubCutaneous at bedtime    dextrose 5%. 1000 milliLiter(s) IV Continuous <Continuous>  influenza   Vaccine 0.5 milliLiter(s) IntraMuscular once  sodium chloride 0.9% lock flush 3 milliLiter(s) IV Push every 8 hours      Allergies    adhesives (Rash)  latex (Urticaria)  No Known Drug Allergies    Intolerances        FAMILY HISTORY:  Family history of pulmonary fibrosis (Sibling)  Family history of malaria  Family history of hypertension      SOCIAL HISTORY:    [ ] Non-smoker  [ ] Smoker  [ ] Alcohol      REVIEW OF SYSTEMS:  [ ]chest pain  [  ]shortness of breath  [  ]palpitations  [  ]syncope  [ ]near syncope [  ]diplopia  [  ]altered mental status   [  ]fevers  [ ]chills [ ]nausea  [ ]vomitting  [ ]abdominal pain  [ ]melena  [ ]BRBPR  [  ]epistaxis  [  ]rash  [  ]lower extremity edema      CONSTITUTIONAL: No fever, weight loss, or fatigue  EYES: No eye pain, visual disturbances, or discharge  ENMT:  No difficulty hearing, tinnitus, vertigo; No sinus or throat pain  NECK: No pain or stiffness  RESPIRATORY: No cough, wheezing, chills or hemoptysis; No Shortness of Breath  CARDIOVASCULAR: No chest pain, palpitations, passing out, dizziness, or leg swelling  GASTROINTESTINAL: No abdominal or epigastric pain. No nausea, vomiting, or hematemesis; No diarrhea or constipation. No melena or hematochezia.  GENITOURINARY: No dysuria, frequency, hematuria, or incontinence  NEUROLOGICAL: No headaches, memory loss, loss of strength, numbness, or tremors  SKIN: No itching, burning, rashes, or lesions   LYMPH Nodes: No enlarged glands  ENDOCRINE: No heat or cold intolerance; No hair loss  MUSCULOSKELETAL: No joint pain or swelling; No muscle, back, or extremity pain  PSYCHIATRIC: No depression, anxiety, mood swings, or difficulty sleeping  HEME/LYMPH: No easy bruising, or bleeding gums  ALLERY AND IMMUNOLOGIC: No hives or eczema	    [ ] All others negative	  [ ] Unable to obtain    PHYSICAL EXAM:  T(C): 36.7 (09-22-19 @ 05:02), Max: 36.9 (09-21-19 @ 15:32)  HR: 56 (09-22-19 @ 05:02) (56 - 109)  BP: 164/80 (09-22-19 @ 05:02) (131/80 - 185/89)  RR: 18 (09-22-19 @ 05:02) (16 - 18)  SpO2: 99% (09-22-19 @ 05:02) (98% - 100%)  Wt(kg): --  I&O's Summary      Appearance: Normal	  HEENT:   Normal oral mucosa, PERRL, EOMI	  Lymphatic: No lymphadenopathy  Cardiovascular: Normal S1 S2, No JVD, No murmurs, No edema  Respiratory: Lungs clear to auscultation	  Psychiatry: A & O x 3, Mood & affect appropriate  Gastrointestinal:  Soft, Non-tender, + BS	  Skin: No rashes, No ecchymoses, No cyanosis	  Neurologic: Non-focal  Extremities: Normal range of motion, No clubbing, cyanosis or edema  Vascular: Peripheral pulses palpable 2+ bilaterally    TELEMETRY: 	  NSR   ECG:  	NSR 88 , st elevation on lead II, III  RADIOLOGY:  OTHER: 	  	  LABS:	 	    CARDIAC MARKERS:      CKMB: 4.16 ng/mL (09-21 @ 23:30)                              11.2   10.26 )-----------( 244      ( 21 Sep 2019 23:30 )             33.5     09-21    137  |  101  |  42<H>  ----------------------------<  241<H>  4.0   |  22  |  2.28<H>    Ca    9.5      21 Sep 2019 15:30    TPro  7.6  /  Alb  3.8  /  TBili  0.3  /  DBili  x   /  AST  11  /  ALT  < 4<L>  /  AlkPhos  74  09-21    proBNP: Serum Pro-Brain Natriuretic Peptide: 370.9 pg/mL (09-21 @ 15:30)    Lipid Profile:   HgA1c:   TSH:     ASSESSMENT/PLAN: 	 68 yo M PMHx CAD s/p stent x 1 (2010), T2DM, HTN, Parkinson's presenting with c/o 10/10 chest pain described as burning radiating from abdomen to midsternum x 1 day.    ECHO / NST pending   Tele stable   Cardiac markers noted, with elevated Troponin T, neg CK / MB   Cont Effient , asa, statin   started on low dose BB last night  , tele stable , tolerating well , would hold for HR <50   serial ECG   further plans post above,   cont tele   D/W Dr Shetty

## 2019-09-22 NOTE — CONSULT NOTE ADULT - SUBJECTIVE AND OBJECTIVE BOX
NEPHROLOGY - NSN    Patient seen and examined.    HPI:  70 yo M PMHx CAD s/p stent x 1 (2010), T2DM, HTN, Parkinson's presenting with c/o 10/10 chest pain described as burning radiating from abdomen to midsternum x 1 day. Patient reports symptoms started at 2 pm and resolved after 1 hour. Chest pain began after he ate lunch and was walking up a flight of stairs. He reports chest pain was associated with dyspnea, diaphoresis, and nausea. At baseline, he ambulates with cane/walker. Takes medications daily including aspirin and prasugrel. On ROS, reports chronic left leg swelling x 1-2 years.    In ED, Vitals: Temp: 97.9, HR: 90, Bp: 154/87, Sp02: 98% on NC. Trop 19-->27. In ED he was started on Heparin gtt for EKG which showed slight VIKI lead II,III. Cardiology was consulted and recommended echo, nst, and continue home meds. No need for heparin gtt currently. Patient denies chest pain at this time (21 Sep 2019 20:44)    Pt has Parkinsons that was diagnosed about 4-5 years ago.  He has very limited activity and has hour of immobility.  He has DM for about 10 years and there is no retinopathy noted.  He has HTN for about the same time and there is no CVA noted.  He is unaware of CKD but creatinine in 2017 was 2.43.  There is no hematuria or bubbles in the urine.  No history of NSAIDS or nephrolithisis.  The patient urinates once or twice in the night and there is no incontinence.  No family hx or renal disease or back pain.    No recent abx use.  No alleviating or aggravating factors with respect to the kidneys.   Last stent was in 2010 and he believes that his heart is normal and there is  no afib or valvular issues      PAST MEDICAL & SURGICAL HISTORY:  Shoulder pain, left  Osteoarthritis  Panic attacks  Urinary frequency  Urinary incontinence  Nocturia: x2-3  Gastroesophageal reflux disease without esophagitis: diet controlled  Vertigo: not improved with meclizine in past  Insomnia  Autoimmune disease: started on prednisone on 12/23/15 for &quot;inflammation&quot; but does not know diagnosis  Leg swelling: left leg  CAD (coronary artery disease): s/p 1 stent in 2010  Parkinson disease  Hypercholesterolemia  Diabetes Mellitus, Type 2  Hypertension  S/P angioplasty with stent: 1 stent, RCA  Status Post Cardiac Catheterization      MEDICATIONS  (STANDING):  aspirin  chewable 81 milliGRAM(s) Oral daily  atorvastatin 10 milliGRAM(s) Oral at bedtime  carbidopa/levodopa  25/100 2 Tablet(s) Oral three times a day  dextrose 5%. 1000 milliLiter(s) (50 mL/Hr) IV Continuous <Continuous>  dextrose 50% Injectable 12.5 Gram(s) IV Push once  dextrose 50% Injectable 25 Gram(s) IV Push once  dextrose 50% Injectable 25 Gram(s) IV Push once  hydrochlorothiazide 25 milliGRAM(s) Oral at bedtime  influenza   Vaccine 0.5 milliLiter(s) IntraMuscular once  insulin glargine Injectable (LANTUS) 22 Unit(s) SubCutaneous every morning  insulin lispro (HumaLOG) corrective regimen sliding scale   SubCutaneous three times a day before meals  insulin lispro (HumaLOG) corrective regimen sliding scale   SubCutaneous at bedtime  metoprolol tartrate 12.5 milliGRAM(s) Oral two times a day  pantoprazole    Tablet 40 milliGRAM(s) Oral before breakfast  prasugrel 10 milliGRAM(s) Oral daily  sodium chloride 0.9% lock flush 3 milliLiter(s) IV Push every 8 hours  trihexyphenidyl 2 milliGRAM(s) Oral three times a day      Allergies    adhesives (Rash)  latex (Urticaria)  No Known Drug Allergies    Intolerances        SOCIAL HISTORY:  Denies alcohol abuse, drug abuse or tobacco usage.     FAMILY HISTORY:  Family history of pulmonary fibrosis (Sibling)  Family history of malaria  Family history of hypertension      VITALS:  T(C): 36.8 (09-22-19 @ 12:00), Max: 36.9 (09-21-19 @ 15:32)  HR: 71 (09-22-19 @ 12:00) (56 - 109)  BP: 168/76 (09-22-19 @ 12:00) (131/80 - 185/89)  RR: 18 (09-22-19 @ 12:00) (16 - 18)  SpO2: 99% (09-22-19 @ 12:00) (98% - 100%)    REVIEW OF SYSTEMS:  Denies any nausea, vomiting, diarrhea, fever or chills. Denies chest pain, SOB, focal weakness, hematuria or dysuria. Good oral intake and denies fatigue or weakness. All other pertinent systems are reviewed and are negative.    PHYSICAL EXAM:  Constitutional: NAD; obese   HEENT: EOMI  Neck:  No JVD, supple   Respiratory: CTA B/L  Cardiovascular: S1 and S2, RRR  Gastrointestinal: + BS, soft, NT, ND  Extremities: No peripheral edema, + peripheral pulses  Neurological: A/O x 3, CN2-12 intact  Psychiatric: Normal mood, normal affect  : No Javier  Skin: No rashes, C/D/I  Access: Not applicable    I and O's:    Height (cm): 180.3 (09-21 @ 20:54)  Weight (kg): 107.955 (09-21 @ 15:42)  BMI (kg/m2): 33.2 (09-21 @ 20:54)  BSA (m2): 2.27 (09-21 @ 20:54)    LABS:                        10.9   9.59  )-----------( 237      ( 22 Sep 2019 05:30 )             32.6     09-22    139  |  98  |  39<H>  ----------------------------<  147<H>  4.0   |  22  |  2.14<H>    Ca    9.5      22 Sep 2019 05:30  Phos  3.4     09-22  Mg     1.8     09-22    TPro  7.6  /  Alb  3.8  /  TBili  0.3  /  DBili  x   /  AST  11  /  ALT  < 4<L>  /  AlkPhos  74  09-21         RADIOLOGY & ADDITIONAL STUDIES:  < from: Xray Chest 1 View- PORTABLE-Urgent (09.21.19 @ 16:03) >    EXAM:  XR CHEST PORTABLE URGENT 1V        PROCEDURE DATE:  Sep 21 2019         INTERPRETATION:  CLINICAL INDICATION: chest pain    EXAM:  Portable AP chest from 9/21/2019 at 1603. Compared to prior study from   2/22/2016.    IMPRESSION:  Left CP angle excluded from view. Sharp right CP angle. Clear remaining   visualized lungs. No pneumothorax.    Stable cardiac and mediastinal silhouettes.    Trachea midline.    Generalized mild osteopenia and mild spinal degenerative change again   noted.                    JENNIFER MENCHACA M.D., ATTENDING RADIOLOGIST  This document has been electronically signed. Sep 21 2019  4:19PM

## 2019-09-22 NOTE — CHART NOTE - NSCHARTNOTEFT_GEN_A_CORE
CT head complete, per radiologist overnight- no hemorrhage noted. Cardiology consult recommending heparin gtt due to rising troponins, pt also to go for cath tomorrow. Per Dr. Fang of neurology, ok to start heparin gtt if CT head negative. Heparin gtt ordered, RN notified, will monitor pt. CT head complete, per radiologist overnight- no hemorrhage noted. Cardiology consult recommending heparin gtt due to rising troponins if cleared by neuro.  Per Dr. Fang of neurology, ok to start heparin gtt if CT head negative. Heparin gtt ordered, RN notified, will monitor pt.  Pt to go for cath tomorrow. CT head complete, per radiologist overnight- no hemorrhage noted. Cardiology consult recommending heparin gtt due to rising troponins if cleared by neuro.  Per Dr. Fang of neurology, " no objection to heparin gtt  if head ct does not show any hemorrhage." Heparin gtt ordered, RN notified, will monitor pt.  Pt to go for cath tomorrow.

## 2019-09-22 NOTE — CONSULT NOTE ADULT - SUBJECTIVE AND OBJECTIVE BOX
[facility] Neurological [facility]        Patient is a 69y old  Male who presents with a chief complaint of chest pain (22 Sep 2019 12:36)    Excerpt from H&P   68 yo M PMHx CAD s/p stent x 1 (2010), T2DM, HTN, Parkinson's presenting with c/o 10/10 chest pain described as burning radiating from abdomen to midsternum x 1 day. Patient reports symptoms started at 2 pm and resolved after 1 hour. Chest pain began after he ate lunch and was walking up a flight of stairs. He reports chest pain was associated with dyspnea, diaphoresis, and nausea. At baseline, he ambulates with cane/walker. Takes medications daily including aspirin and prasugrel. On ROS, reports chronic left leg swelling x 1-2 years.    In ED, Vitals: Temp: 97.9, HR: 90, Bp: 154/87, Sp02: 98% on NC. Trop 19-->27. In ED he was started on Heparin gtt for EKG which showed slight VIKI lead II,III. Cardiology was consulted and recommended echo, nst, and continue home meds. No need for heparin gtt currently. Patient denies chest pain at this time (21 Sep 2019 20:44)           *****PAST MEDICAL / Surgical  HISTORY:  PAST MEDICAL & SURGICAL HISTORY:  Shoulder pain, left  Osteoarthritis  Panic attacks  Urinary frequency  Urinary incontinence  Nocturia: x2-3  Gastroesophageal reflux disease without esophagitis: diet controlled  Vertigo: not improved with meclizine in past  Insomnia  Autoimmune disease: started on prednisone on 12/23/15 for &quot;inflammation&quot; but does not know diagnosis  Leg swelling: left leg  CAD (coronary artery disease): s/p 1 stent in 2010  Parkinson disease  Hypercholesterolemia  Diabetes Mellitus, Type 2  Hypertension  S/P angioplasty with stent: 1 stent, RCA  Status Post Cardiac Catheterization           *****FAMILY HISTORY:  FAMILY HISTORY:  Family history of pulmonary fibrosis (Sibling)  Family history of malaria  Family history of hypertension           *****SOCIAL HISTORY:  Alcohol: None  Smoking: None         *****ALLERGIES:   Allergies    adhesives (Rash)  latex (Urticaria)  No Known Drug Allergies    Intolerances             *****MEDICATIONS: current medication reviewed and documented.   MEDICATIONS  (STANDING):  amLODIPine   Tablet 2.5 milliGRAM(s) Oral daily  aspirin  chewable 81 milliGRAM(s) Oral daily  atorvastatin 10 milliGRAM(s) Oral at bedtime  carbidopa/levodopa  25/100 2 Tablet(s) Oral three times a day  dextrose 5%. 1000 milliLiter(s) (50 mL/Hr) IV Continuous <Continuous>  dextrose 50% Injectable 12.5 Gram(s) IV Push once  dextrose 50% Injectable 25 Gram(s) IV Push once  dextrose 50% Injectable 25 Gram(s) IV Push once  hydrochlorothiazide 25 milliGRAM(s) Oral at bedtime  influenza   Vaccine 0.5 milliLiter(s) IntraMuscular once  insulin glargine Injectable (LANTUS) 22 Unit(s) SubCutaneous every morning  insulin lispro (HumaLOG) corrective regimen sliding scale   SubCutaneous three times a day before meals  insulin lispro (HumaLOG) corrective regimen sliding scale   SubCutaneous at bedtime  metoprolol tartrate 12.5 milliGRAM(s) Oral two times a day  pantoprazole    Tablet 40 milliGRAM(s) Oral before breakfast  prasugrel 10 milliGRAM(s) Oral daily  sodium chloride 0.9% lock flush 3 milliLiter(s) IV Push every 8 hours  trihexyphenidyl 2 milliGRAM(s) Oral three times a day    MEDICATIONS  (PRN):  dextrose 40% Gel 15 Gram(s) Oral once PRN Blood Glucose LESS THAN 70 milliGRAM(s)/deciliter  glucagon  Injectable 1 milliGRAM(s) IntraMuscular once PRN Glucose LESS THAN 70 milligrams/deciliter           *****REVIEW OF SYSTEM:  GEN: no fever, no chills, no pain  RESP: no SOB, no cough, no sputum  CVS: no chest pain, no palpitations, no edema  GI: no abdominal pain, no nausea, no vomiting, no constipation, no diarrhea  : no dysurea, no frequency, no hematurea  Neuro: no headache, no dizziness  PSYCH: no anxiety, no depression  Derm : no itching, no rash         *****VITAL SIGNS:  T(C): 36.8 (09-22-19 @ 12:00), Max: 36.9 (09-21-19 @ 15:32)  HR: 71 (09-22-19 @ 12:00) (56 - 109)  BP: 168/76 (09-22-19 @ 12:00) (131/80 - 185/89)  RR: 18 (09-22-19 @ 12:00) (16 - 18)  SpO2: 99% (09-22-19 @ 12:00) (98% - 100%)  Wt(kg): --           *****PHYSICAL EXAM:   Alert oriented x 2   Attention comprehension are fair. Able to name, repeat,  without any difficulty.   Able to follow 3 step commands.     EOMI fundi not visualized,  VFF to confrontration  No facial asymmetry   Tongue is midline   Palate elevates symmetrically   Moving all 4 ext symmetrically no pronator drift   Reflexes are symmetric throughout   sensation is grossly symmetric  Gait : not assessed.  B/L down going toes               *****LAB AND IMAGING:                          10.9   9.59  )-----------( 237      ( 22 Sep 2019 05:30 )             32.6               09-22    139  |  98  |  39<H>  ----------------------------<  147<H>  4.0   |  22  |  2.14<H>    Ca    9.5      22 Sep 2019 05:30  Phos  3.4     09-22  Mg     1.8     09-22    TPro  7.6  /  Alb  3.8  /  TBili  0.3  /  DBili  x   /  AST  11  /  ALT  < 4<L>  /  AlkPhos  74  09-21    PT/INR - ( 21 Sep 2019 15:30 )   PT: 11.9 SEC;   INR: 1.07          PTT - ( 21 Sep 2019 23:30 )  PTT:23.1 SEC            CARDIAC MARKERS ( 21 Sep 2019 23:30 )  x     / x     / 101 u/L / 4.16 ng/mL / x                      [All pertinent recent Imaging reports reviewed]         *****A S S E S S M E N T   A N D   P L A N :     68 yo M PMHx CAD s/p stent x 1 (2010), T2DM, HTN, Parkinson's presenting with c/o 10/10 chest pain described as burning radiating from abdomen to midsternum x 1 day. Patient reports symptoms started at 2 pm and resolved after 1 hour. Chest pain began after he ate lunch and was walking up a flight of stairs. He reports chest pain was associated with dyspnea, diaphoresis, and nausea. At baseline, he ambulates with cane/walker. Takes medications daily including aspirin and prasugrel. On ROS, reports chronic left leg swelling x 1-2 years.    In ED, Vitals: Temp: 97.9, HR: 90, Bp: 154/87, Sp02: 98% on NC. Trop 19-->27. In ED he was started on Heparin gtt for EKG which showed slight VIKI lead II,III. Cardiology was consulted and recommended echo, nst, and continue home meds. No need for heparin gtt currently. Patient denies chest pain at this time (21 Sep 2019 20:44)         Problem/Recommendations 1: Dizziness of unclear etiology   cardiac w/u underway   no objection to heparin gtt   if head ct does not show any hemorrhage     Problem/Recommendations 2:       ___________________________  Will follow with you.  Thank you,  Lily Fang MD  Diplomate of the American Board of Neurology and Psychiatry.  Diplomate of the American Board of Vascular Neurology.   [facility] ([facility]), [facility]   Ph: [phone]    Differential diagnosis and plan of care discussed with patient after the evaluation.   Advanced care planning options discussed.   Pain assessed and judicious use of narcotics when appropriate was discussed.  Importance of Fall prevention discussed.  Counseling on Smoking and Alcohol cessation was offered when appropriate.  Counseling on Diet, exercise, and medication compliance was done.     62 minutes spent on the total encounter;  more than 50 % of the visit was spent on counseling  and or coordinating care by the attending physician.    Thank you for allowing me to participate in the care of this sabrina patient. Please do not hesitate to call me if you have any questions.     This and subsequent notes were partially created using voice recognition software and will  inherently be subject to errors including those of syntax and sound alike substitutions which may escape proofreading. In such instances original meaning may be extrapolated by contextual derivation. [facility]rological [facility]        Patient is a 69y old  Male who presents with a chief complaint of chest pain (22 Sep 2019 12:36)    Excerpt from H&P   70 yo M PMHx CAD s/p stent x 1 (2010), T2DM, HTN, Parkinson's presenting with c/o 10/10 chest pain described as burning radiating from abdomen to midsternum x 1 day. Patient reports symptoms started at 2 pm and resolved after 1 hour. Chest pain began after he ate lunch and was walking up a flight of stairs. He reports chest pain was associated with dyspnea, diaphoresis, and nausea. At baseline, he ambulates with cane/walker. Takes medications daily including aspirin and prasugrel. On ROS, reports chronic left leg swelling x 1-2 years.    In ED, Vitals: Temp: 97.9, HR: 90, Bp: 154/87, Sp02: 98% on NC. Trop 19-->27. In ED he was started on Heparin gtt for EKG which showed slight VIKI lead II,III. Cardiology was consulted and recommended echo, nst, and continue home meds. No need for heparin gtt currently. Patient denies chest pain at this time (21 Sep 2019 20:44)           *****PAST MEDICAL / Surgical  HISTORY:  PAST MEDICAL & SURGICAL HISTORY:  Shoulder pain, left  Osteoarthritis  Panic attacks  Urinary frequency  Urinary incontinence  Nocturia: x2-3  Gastroesophageal reflux disease without esophagitis: diet controlled  Vertigo: not improved with meclizine in past  Insomnia  Autoimmune disease: started on prednisone on 12/23/15 for &quot;inflammation&quot; but does not know diagnosis  Leg swelling: left leg  CAD (coronary artery disease): s/p 1 stent in 2010  Parkinson disease  Hypercholesterolemia  Diabetes Mellitus, Type 2  Hypertension  S/P angioplasty with stent: 1 stent, RCA  Status Post Cardiac Catheterization           *****FAMILY HISTORY:  FAMILY HISTORY:  Family history of pulmonary fibrosis (Sibling)  Family history of malaria  Family history of hypertension           *****SOCIAL HISTORY:  Alcohol: None  Smoking: None         *****ALLERGIES:   Allergies    adhesives (Rash)  latex (Urticaria)  No Known Drug Allergies    Intolerances             *****MEDICATIONS: current medication reviewed and documented.   MEDICATIONS  (STANDING):  amLODIPine   Tablet 2.5 milliGRAM(s) Oral daily  aspirin  chewable 81 milliGRAM(s) Oral daily  atorvastatin 10 milliGRAM(s) Oral at bedtime  carbidopa/levodopa  25/100 2 Tablet(s) Oral three times a day  dextrose 5%. 1000 milliLiter(s) (50 mL/Hr) IV Continuous <Continuous>  dextrose 50% Injectable 12.5 Gram(s) IV Push once  dextrose 50% Injectable 25 Gram(s) IV Push once  dextrose 50% Injectable 25 Gram(s) IV Push once  hydrochlorothiazide 25 milliGRAM(s) Oral at bedtime  influenza   Vaccine 0.5 milliLiter(s) IntraMuscular once  insulin glargine Injectable (LANTUS) 22 Unit(s) SubCutaneous every morning  insulin lispro (HumaLOG) corrective regimen sliding scale   SubCutaneous three times a day before meals  insulin lispro (HumaLOG) corrective regimen sliding scale   SubCutaneous at bedtime  metoprolol tartrate 12.5 milliGRAM(s) Oral two times a day  pantoprazole    Tablet 40 milliGRAM(s) Oral before breakfast  prasugrel 10 milliGRAM(s) Oral daily  sodium chloride 0.9% lock flush 3 milliLiter(s) IV Push every 8 hours  trihexyphenidyl 2 milliGRAM(s) Oral three times a day    MEDICATIONS  (PRN):  dextrose 40% Gel 15 Gram(s) Oral once PRN Blood Glucose LESS THAN 70 milliGRAM(s)/deciliter  glucagon  Injectable 1 milliGRAM(s) IntraMuscular once PRN Glucose LESS THAN 70 milligrams/deciliter           *****REVIEW OF SYSTEM:  GEN: no fever, no chills, no pain  RESP: no SOB, no cough, no sputum  CVS: no chest pain, no palpitations, no edema  GI: no abdominal pain, no nausea, no vomiting, no constipation, no diarrhea  : no dysurea, no frequency, no hematurea  Neuro: no headache, no dizziness  PSYCH: no anxiety, no depression  Derm : no itching, no rash         *****VITAL SIGNS:  T(C): 36.8 (09-22-19 @ 12:00), Max: 36.9 (09-21-19 @ 15:32)  HR: 71 (09-22-19 @ 12:00) (56 - 109)  BP: 168/76 (09-22-19 @ 12:00) (131/80 - 185/89)  RR: 18 (09-22-19 @ 12:00) (16 - 18)  SpO2: 99% (09-22-19 @ 12:00) (98% - 100%)  Wt(kg): --           *****PHYSICAL EXAM:   Alert oriented x 2   Attention comprehension are fair. Able to name, repeat,  without any difficulty.   Able to follow 1-2 step commands.     EOMI fundi not visualized,  VFF to confrontration  No facial asymmetry   Tongue is midline   Palate elevates symmetrically   Moving all 4 ext symmetrically no pronator drift  left resting tremor noted   + cogwheeling     Gait : not assessed.  B/L down going toes               *****LAB AND IMAGING:                          10.9   9.59  )-----------( 237      ( 22 Sep 2019 05:30 )             32.6               09-22    139  |  98  |  39<H>  ----------------------------<  147<H>  4.0   |  22  |  2.14<H>    Ca    9.5      22 Sep 2019 05:30  Phos  3.4     09-22  Mg     1.8     09-22    TPro  7.6  /  Alb  3.8  /  TBili  0.3  /  DBili  x   /  AST  11  /  ALT  < 4<L>  /  AlkPhos  74  09-21    PT/INR - ( 21 Sep 2019 15:30 )   PT: 11.9 SEC;   INR: 1.07          PTT - ( 21 Sep 2019 23:30 )  PTT:23.1 SEC            CARDIAC MARKERS ( 21 Sep 2019 23:30 )  x     / x     / 101 u/L / 4.16 ng/mL / x                      [All pertinent recent Imaging reports reviewed]         *****A S S E S S M E N T   A N D   P L A N :     70 yo M PMHx CAD s/p stent x 1 (2010), T2DM, HTN, Parkinson's presenting with c/o 10/10 chest pain described as burning radiating from abdomen to midsternum x 1 day. Patient reports symptoms started at 2 pm and resolved after 1 hour. Chest pain began after he ate lunch and was walking up a flight of stairs. He reports chest pain was associated with dyspnea, diaphoresis, and nausea. At baseline, he ambulates with cane/walker. Takes medications daily including aspirin and prasugrel. On ROS, reports chronic left leg swelling x 1-2 years.    In ED, Vitals: Temp: 97.9, HR: 90, Bp: 154/87, Sp02: 98% on NC. Trop 19-->27. In ED he was started on Heparin gtt for EKG which showed slight VIKI lead II,III. Cardiology was consulted and recommended echo, nst, and continue home meds. No need for heparin gtt currently. Patient denies chest pain at this time (21 Sep 2019 20:44)         Problem/Recommendations 1: Dizziness of unclear etiology   cardiac w/u underway   no objection to heparin gtt  if head ct does not show any hemorrhage     Problem/Recommendations 2: Parkinsons   continue current regimen.       ___________________________  Will follow with you.  Thank you,  Lily Fang MD  Diplomate of the American Board of Neurology and Psychiatry.  Diplomate of the American Board of Vascular Neurology.   [facility] ([facility]), [facility]   Ph: [phone]    Differential diagnosis and plan of care discussed with patient after the evaluation.   Advanced care planning options discussed.   Pain assessed and judicious use of narcotics when appropriate was discussed.  Importance of Fall prevention discussed.  Counseling on Smoking and Alcohol cessation was offered when appropriate.  Counseling on Diet, exercise, and medication compliance was done.     62 minutes spent on the total encounter;  more than 50 % of the visit was spent on counseling  and or coordinating care by the attending physician.    Thank you for allowing me to participate in the care of this sabrina patient. Please do not hesitate to call me if you have any questions.     This and subsequent notes were partially created using voice recognition software and will  inherently be subject to errors including those of syntax and sound alike substitutions which may escape proofreading. In such instances original meaning may be extrapolated by contextual derivation.

## 2019-09-22 NOTE — PROVIDER CONTACT NOTE (OTHER) - BACKGROUND
Pt admitted for chest pain. PMH of leg swelling, CAD, shoulder pain, osteoarthritis, panic attacks, urinary frequency, urinary incontinence, nocturia, vertigo, GERD, insomnia, autoimmune disease, etc

## 2019-09-22 NOTE — PROVIDER CONTACT NOTE (CRITICAL VALUE NOTIFICATION) - ASSESSMENT
Pt A&Ox4. Pt NSR on tele monitoring. SpO2 99% on room air. Pt asymptomatic at this time. Pt denies chest pain, SOB and general pain.

## 2019-09-22 NOTE — PROGRESS NOTE ADULT - SUBJECTIVE AND OBJECTIVE BOX
Subjective: No chest pain or sob; complains of dizziness which is chronic; ROS otherwise negative   	  MEDICATIONS:  MEDICATIONS  (STANDING):  amLODIPine   Tablet 2.5 milliGRAM(s) Oral daily  aspirin  chewable 81 milliGRAM(s) Oral daily  atorvastatin 10 milliGRAM(s) Oral at bedtime  carbidopa/levodopa  25/100 2 Tablet(s) Oral three times a day  dextrose 5%. 1000 milliLiter(s) (50 mL/Hr) IV Continuous <Continuous>  dextrose 50% Injectable 12.5 Gram(s) IV Push once  dextrose 50% Injectable 25 Gram(s) IV Push once  dextrose 50% Injectable 25 Gram(s) IV Push once  hydrochlorothiazide 25 milliGRAM(s) Oral at bedtime  influenza   Vaccine 0.5 milliLiter(s) IntraMuscular once  insulin glargine Injectable (LANTUS) 22 Unit(s) SubCutaneous every morning  insulin lispro (HumaLOG) corrective regimen sliding scale   SubCutaneous three times a day before meals  insulin lispro (HumaLOG) corrective regimen sliding scale   SubCutaneous at bedtime  metoprolol tartrate 12.5 milliGRAM(s) Oral two times a day  pantoprazole    Tablet 40 milliGRAM(s) Oral before breakfast  prasugrel 10 milliGRAM(s) Oral daily  sodium chloride 0.9% lock flush 3 milliLiter(s) IV Push every 8 hours  trihexyphenidyl 2 milliGRAM(s) Oral three times a day      LABS:	 	    CARDIAC MARKERS:  CARDIAC MARKERS ( 21 Sep 2019 23:30 )  x     / x     / 101 u/L / 4.16 ng/mL / x                                    10.9   9.59  )-----------( 237      ( 22 Sep 2019 05:30 )             32.6     09-22    139  |  98  |  39<H>  ----------------------------<  147<H>  4.0   |  22  |  2.14<H>    Ca    9.5      22 Sep 2019 05:30  Phos  3.4     09-22  Mg     1.8     09-22    TPro  7.6  /  Alb  3.8  /  TBili  0.3  /  DBili  x   /  AST  11  /  ALT  < 4<L>  /  AlkPhos  74  09-21    proBNP: Serum Pro-Brain Natriuretic Peptide: 370.9 pg/mL (09-21 @ 15:30)    Lipid Profile:   HgA1c: Hemoglobin A1C, Whole Blood: 7.0 % (09-22 @ 05:30)    TSH: Thyroid Stimulating Hormone, Serum: 4.96 uIU/mL (09-22 @ 05:30)        PHYSICAL EXAM:  T(C): 36.8 (09-22-19 @ 12:00), Max: 36.9 (09-21-19 @ 15:32)  HR: 71 (09-22-19 @ 12:00) (56 - 109)  BP: 168/76 (09-22-19 @ 12:00) (131/80 - 185/89)  RR: 18 (09-22-19 @ 12:00) (16 - 18)  SpO2: 99% (09-22-19 @ 12:00) (98% - 100%)  Wt(kg): --  I&O's Summary    Height (cm): 180.3 (09-21 @ 20:54)  Weight (kg): 107.955 (09-21 @ 15:42)  BMI (kg/m2): 33.2 (09-21 @ 20:54)  BSA (m2): 2.27 (09-21 @ 20:54)    Appearance: Normal	  	  Lymphatic: No lymphadenopathy , + edema in LE bilaterally   Cardiovascular: Normal S1 S2, No JVD, No murmurs , Peripheral pulses palpable 2+ bilaterally  Respiratory: Lungs clear to auscultation, normal effort 	  Gastrointestinal:  Soft, Non-tender, + BS	  Skin: No rashes, No ecchymoses, No cyanosis, warm to touch  Psychiatry:  Mood & affect appropriate    TELEMETRY: SR	    ECG:  ST old inferior infarct 	  RADIOLOGY:   DIAGNOSTIC TESTING:  [ ] Echocardiogram:  [ ]  Catheterization:  [ ] Stress Test:    OTHER: 	      ASSESSMENT/PLAN:  68 yo M PMHx CAD s/p stent x 1 (2010), T2DM, HTN, Parkinson's presenting with c/o 10/10 chest pain described as burning radiating from abdomen to midsternum x 1 day.    -pt. currently chest pain free  -troponin rising - would ideally start hep gtt however pt. with dizziness and reports a history of meningioma   -would check CTH and consult Neuro (Dr. Fang called)  -hep gtt if cth negative and ok with neuro  -renal consult for elevated cr  -check d-dimer - if elevated check vq scan  -if above normal, will plan on cath tomorrow if renally cleared    Apple Feliz MD

## 2019-09-22 NOTE — CONSULT NOTE ADULT - SUBJECTIVE AND OBJECTIVE BOX
70 yo M PMHx CAD s/p stent x 1 (), T2DM, HTN, Parkinson's presenting with c/o 10/10 chest pain described as burning radiating from abdomen to midsternum x 1 day. Patient reports symptoms started at 2 pm and resolved after 1 hour. Chest pain began after he ate lunch and was walking up a flight of stairs. He reports chest pain was associated with dyspnea, diaphoresis, and nausea. At baseline, he ambulates with cane/walker. Takes medications daily including aspirin and prasugrel. On ROS, reports chronic left leg swelling x 1-2 years.    Allergies NKDA    REVIEW OF SYSTEMS:    CONSTITUTIONAL: No weakness, fevers or chills  EYES/ENT: No visual changes;  No vertigo or throat pain   NECK: No pain or stiffness  RESPIRATORY: No cough, wheezing, hemoptysis; No shortness of breath  CARDIOVASCULAR: No chest pain or palpitations  GASTROINTESTINAL: No abdominal or epigastric pain. No nausea, vomiting, or hematemesis; No diarrhea or constipation. No melena or hematochezia.  GENITOURINARY: No dysuria, frequency or hematuria  NEUROLOGICAL: No numbness or weakness  SKIN: No itching, burning, rashes, or lesions   All other review of systems is negative unless indicated above.    Home Medications:   * Incomplete Medication History as of 21-Sep-2019 20:43 documented in Structured Notes  · 	Valium 5 mg oral tablet: Last Dose Taken:  , 1 tab(s) orally 2 times a day for your parkinson MDD:2  · 	insulin lispro 100 units/mL subcutaneous solution: Last Dose Taken:  ,  subcutaneous 3 times a day (before meals), 1 Unit(s) if Glucose 151 - 200  	2 Unit(s) if Glucose 201 - 250  	3 Unit(s) if Glucose 251 - 300  	4 Unit(s) if Glucose 301 - 350  	5 Unit(s) if Glucose 351 - 400  	6 Unit(s) if Glucose Greater Than 400  · 	aspirin 81 mg oral tablet, chewable: Last Dose Taken:  , 1 tab(s) orally once a day  · 	ocular lubricant ophthalmic solution: Last Dose Taken:  , 1 drop(s) to each affected eye 4 times a day, As needed, Dry Eyes  · 	tamsulosin 0.4 mg oral capsule: Last Dose Taken:  , 1 cap(s) orally once a day (at bedtime)  · 	pantoprazole 40 mg oral delayed release tablet: Last Dose Taken:  , 1 tab(s) orally once a day (before a meal)  · 	Lantus 100 units/mL subcutaneous solution: Last Dose Taken:  , 20  subcutaneous once a day (at bedtime)  · 	carbidopa-levodopa 25 mg-100 mg oral tablet: Last Dose Taken:  , 2 tab(s) orally 3 times a day  · 	losartan-hydroCHLOROthiazide 100 mg-25 mg oral tablet: Last Dose Taken:  , 1 tab(s) orally once a day  · 	glimepiride 2 mg oral tablet: Last Dose Taken:  , 1 tab(s) orally once a day  · 	prasugrel 10 mg oral tablet: Last Dose Taken:  , 1 tab(s) orally once a day  · 	trihexyphenidyl 2 mg oral tablet: Last Dose Taken:  , 1 tab(s) orally 3 times a day  · 	pravastatin 10 mg oral tablet: Last Dose Taken:  , 1 tab(s) orally once a day  · 	Effient 10 mg oral tablet: Last Dose Taken:  , 1 tab(s) orally once a day (in the evening)  · 	Fish Oil 1000 mg oral capsule: Last Dose Taken:  , 1 cap(s) orally 3 times a day  · 	Vitamin D3 1000 intl units oral capsule: Last Dose Taken:  , 1 cap(s) orally once a day  · 	Vitamin C 1000 mg oral tablet: Last Dose Taken:  , 1 tab(s) orally once a day    .    Patient History: Past Medical, Past Surgical, and Family History:  PAST MEDICAL HISTORY:  Autoimmune disease started on prednisone on 12/23/15 for "inflammation" but does not know diagnosis    CAD (coronary artery disease) s/p 1 stent in     Diabetes Mellitus, Type 2     Gastroesophageal reflux disease without esophagitis diet controlled    Hypercholesterolemia     Hypertension     Insomnia     Leg swelling left leg  Nocturia x2-3    Osteoarthritis     Panic attacks     Parkinson disease     Shoulder pain, left     Urinary frequency     Urinary incontinence     Vertigo not improved with meclizine in past.     PAST SURGICAL HISTORY:  S/P angioplasty with stent 1 stent, RCA    Status Post Cardiac Catheterization.     FAMILY HISTORY:  Family history of hypertension  Family history of malaria    Sibling  Still living? No  Family history of pulmonary fibrosis, Age at diagnosis: Age Unknown.    Social History:  Social History (marital status, living situation, occupation, tobacco use, alcohol and drug use, and sexual history): Patient lives at home with family members. Takes medication daily. Ambulates with cane/walker. Denies smoking tobacco or drinking alcohol.	    Tobacco Screening:  · Core Measure Site	No	  · Has the patient used tobacco in the past 30 days?	No	      Physical exam    General: WN/WD NAD  PERRLA  Neurology: A&Ox3, nonfocal, SAMPSON x 4  Respiratory: CTA B/L  CV: RRR, S1S2, no murmurs, rubs or gallops  Abdominal: Soft, NT, ND +BS, Last BM  Extremities: No edema, + peripheral pulses  Skin Normal     Labs    Lab Results:  CBC  CBC Full  -  ( 22 Sep 2019 05:30 )  WBC Count : 9.59 K/uL  RBC Count : 3.55 M/uL  Hemoglobin : 10.9 g/dL  Hematocrit : 32.6 %  Platelet Count - Automated : 237 K/uL  Mean Cell Volume : 91.8 fL  Mean Cell Hemoglobin : 30.7 pg  Mean Cell Hemoglobin Concentration : 33.4 %  Auto Neutrophil # : x  Auto Lymphocyte # : x  Auto Monocyte # : x  Auto Eosinophil # : x  Auto Basophil # : x  Auto Neutrophil % : x  Auto Lymphocyte % : x  Auto Monocyte % : x  Auto Eosinophil % : x  Auto Basophil % : x    .		Differential:	[] Automated		[] Manual  Chemistry                        10.9   9.59  )-----------( 237      ( 22 Sep 2019 05:30 )             32.6     09-    139  |  98  |  39<H>  ----------------------------<  147<H>  4.0   |  22  |  2.14<H>    Ca    9.5      22 Sep 2019 05:30  Phos  3.4     -  Mg     1.8     -    TPro  7.6  /  Alb  3.8  /  TBili  0.3  /  DBili  x   /  AST  11  /  ALT  < 4<L>  /  AlkPhos  74  -21    LIVER FUNCTIONS - ( 21 Sep 2019 15:30 )  Alb: 3.8 g/dL / Pro: 7.6 g/dL / ALK PHOS: 74 u/L / ALT: < 4 u/L / AST: 11 u/L / GGT: x           PT/INR - ( 21 Sep 2019 15:30 )   PT: 11.9 SEC;   INR: 1.07          PTT - ( 21 Sep 2019 23:30 )  PTT:23.1 SEC  Urinalysis Basic - ( 22 Sep 2019 13:00 )    Color: LIGHT YELLOW / Appearance: CLEAR / S.012 / pH: 6.5  Gluc: NEGATIVE / Ketone: NEGATIVE  / Bili: NEGATIVE / Urobili: NORMAL   Blood: NEGATIVE / Protein: 100 / Nitrite: NEGATIVE   Leuk Esterase: NEGATIVE / RBC: 0-2 / WBC 0-2   Sq Epi: OCC / Non Sq Epi: x / Bacteria: NEGATIVE            MICROBIOLOGY/CULTURES:      RADIOLOGY RESULTS: reviewed

## 2019-09-23 LAB
ANION GAP SERPL CALC-SCNC: 15 MMO/L — HIGH (ref 7–14)
APTT BLD: 41.7 SEC — HIGH (ref 27.5–36.3)
BUN SERPL-MCNC: 38 MG/DL — HIGH (ref 7–23)
CALCIUM SERPL-MCNC: 10.1 MG/DL — SIGNIFICANT CHANGE UP (ref 8.4–10.5)
CHLORIDE SERPL-SCNC: 100 MMOL/L — SIGNIFICANT CHANGE UP (ref 98–107)
CO2 SERPL-SCNC: 23 MMOL/L — SIGNIFICANT CHANGE UP (ref 22–31)
CREAT SERPL-MCNC: 2.33 MG/DL — HIGH (ref 0.5–1.3)
GLUCOSE BLDC GLUCOMTR-MCNC: 123 MG/DL — HIGH (ref 70–99)
GLUCOSE BLDC GLUCOMTR-MCNC: 132 MG/DL — HIGH (ref 70–99)
GLUCOSE BLDC GLUCOMTR-MCNC: 142 MG/DL — HIGH (ref 70–99)
GLUCOSE BLDC GLUCOMTR-MCNC: 97 MG/DL — SIGNIFICANT CHANGE UP (ref 70–99)
GLUCOSE SERPL-MCNC: 119 MG/DL — HIGH (ref 70–99)
HCT VFR BLD CALC: 38 % — LOW (ref 39–50)
HCT VFR BLD CALC: 38.1 % — LOW (ref 39–50)
HGB BLD-MCNC: 12.6 G/DL — LOW (ref 13–17)
HGB BLD-MCNC: 12.8 G/DL — LOW (ref 13–17)
MAGNESIUM SERPL-MCNC: 1.9 MG/DL — SIGNIFICANT CHANGE UP (ref 1.6–2.6)
MCHC RBC-ENTMCNC: 30.4 PG — SIGNIFICANT CHANGE UP (ref 27–34)
MCHC RBC-ENTMCNC: 30.8 PG — SIGNIFICANT CHANGE UP (ref 27–34)
MCHC RBC-ENTMCNC: 33.2 % — SIGNIFICANT CHANGE UP (ref 32–36)
MCHC RBC-ENTMCNC: 33.6 % — SIGNIFICANT CHANGE UP (ref 32–36)
MCV RBC AUTO: 91.8 FL — SIGNIFICANT CHANGE UP (ref 80–100)
MCV RBC AUTO: 91.8 FL — SIGNIFICANT CHANGE UP (ref 80–100)
NRBC # FLD: 0 K/UL — SIGNIFICANT CHANGE UP (ref 0–0)
NRBC # FLD: 0 K/UL — SIGNIFICANT CHANGE UP (ref 0–0)
PHOSPHATE SERPL-MCNC: 3.5 MG/DL — SIGNIFICANT CHANGE UP (ref 2.5–4.5)
PLATELET # BLD AUTO: 249 K/UL — SIGNIFICANT CHANGE UP (ref 150–400)
PLATELET # BLD AUTO: 256 K/UL — SIGNIFICANT CHANGE UP (ref 150–400)
PMV BLD: 11.6 FL — SIGNIFICANT CHANGE UP (ref 7–13)
PMV BLD: 11.6 FL — SIGNIFICANT CHANGE UP (ref 7–13)
POTASSIUM SERPL-MCNC: 4.1 MMOL/L — SIGNIFICANT CHANGE UP (ref 3.5–5.3)
POTASSIUM SERPL-SCNC: 4.1 MMOL/L — SIGNIFICANT CHANGE UP (ref 3.5–5.3)
RBC # BLD: 4.14 M/UL — LOW (ref 4.2–5.8)
RBC # BLD: 4.15 M/UL — LOW (ref 4.2–5.8)
RBC # FLD: 13 % — SIGNIFICANT CHANGE UP (ref 10.3–14.5)
RBC # FLD: 13.2 % — SIGNIFICANT CHANGE UP (ref 10.3–14.5)
SODIUM SERPL-SCNC: 138 MMOL/L — SIGNIFICANT CHANGE UP (ref 135–145)
WBC # BLD: 10.13 K/UL — SIGNIFICANT CHANGE UP (ref 3.8–10.5)
WBC # BLD: 9.63 K/UL — SIGNIFICANT CHANGE UP (ref 3.8–10.5)
WBC # FLD AUTO: 10.13 K/UL — SIGNIFICANT CHANGE UP (ref 3.8–10.5)
WBC # FLD AUTO: 9.63 K/UL — SIGNIFICANT CHANGE UP (ref 3.8–10.5)

## 2019-09-23 RX ORDER — HEPARIN SODIUM 5000 [USP'U]/ML
6000 INJECTION INTRAVENOUS; SUBCUTANEOUS EVERY 6 HOURS
Refills: 0 | Status: DISCONTINUED | OUTPATIENT
Start: 2019-09-23 | End: 2019-09-25

## 2019-09-23 RX ORDER — INSULIN LISPRO 100/ML
VIAL (ML) SUBCUTANEOUS EVERY 6 HOURS
Refills: 0 | Status: DISCONTINUED | OUTPATIENT
Start: 2019-09-23 | End: 2019-09-23

## 2019-09-23 RX ORDER — HEPARIN SODIUM 5000 [USP'U]/ML
INJECTION INTRAVENOUS; SUBCUTANEOUS
Qty: 25000 | Refills: 0 | Status: DISCONTINUED | OUTPATIENT
Start: 2019-09-23 | End: 2019-09-25

## 2019-09-23 RX ORDER — SODIUM CHLORIDE 9 MG/ML
500 INJECTION INTRAMUSCULAR; INTRAVENOUS; SUBCUTANEOUS
Refills: 0 | Status: DISCONTINUED | OUTPATIENT
Start: 2019-09-23 | End: 2019-09-25

## 2019-09-23 RX ORDER — INSULIN LISPRO 100/ML
VIAL (ML) SUBCUTANEOUS
Refills: 0 | Status: DISCONTINUED | OUTPATIENT
Start: 2019-09-23 | End: 2019-09-25

## 2019-09-23 RX ORDER — INSULIN GLARGINE 100 [IU]/ML
10 INJECTION, SOLUTION SUBCUTANEOUS ONCE
Refills: 0 | Status: COMPLETED | OUTPATIENT
Start: 2019-09-23 | End: 2019-09-23

## 2019-09-23 RX ORDER — SODIUM CHLORIDE 9 MG/ML
1000 INJECTION, SOLUTION INTRAVENOUS
Refills: 0 | Status: DISCONTINUED | OUTPATIENT
Start: 2019-09-23 | End: 2019-09-25

## 2019-09-23 RX ADMIN — CARBIDOPA AND LEVODOPA 2 TABLET(S): 25; 100 TABLET ORAL at 06:28

## 2019-09-23 RX ADMIN — SODIUM CHLORIDE 3 MILLILITER(S): 9 INJECTION INTRAMUSCULAR; INTRAVENOUS; SUBCUTANEOUS at 14:18

## 2019-09-23 RX ADMIN — SODIUM CHLORIDE 70 MILLILITER(S): 9 INJECTION INTRAMUSCULAR; INTRAVENOUS; SUBCUTANEOUS at 09:00

## 2019-09-23 RX ADMIN — Medication 12.5 MILLIGRAM(S): at 16:23

## 2019-09-23 RX ADMIN — Medication 81 MILLIGRAM(S): at 11:05

## 2019-09-23 RX ADMIN — CARBIDOPA AND LEVODOPA 2 TABLET(S): 25; 100 TABLET ORAL at 21:38

## 2019-09-23 RX ADMIN — ATORVASTATIN CALCIUM 10 MILLIGRAM(S): 80 TABLET, FILM COATED ORAL at 21:38

## 2019-09-23 RX ADMIN — SODIUM CHLORIDE 3 MILLILITER(S): 9 INJECTION INTRAMUSCULAR; INTRAVENOUS; SUBCUTANEOUS at 06:28

## 2019-09-23 RX ADMIN — Medication 2 MILLIGRAM(S): at 06:28

## 2019-09-23 RX ADMIN — Medication 2 MILLIGRAM(S): at 21:39

## 2019-09-23 RX ADMIN — Medication 25 MILLIGRAM(S): at 21:38

## 2019-09-23 RX ADMIN — AMLODIPINE BESYLATE 2.5 MILLIGRAM(S): 2.5 TABLET ORAL at 06:28

## 2019-09-23 RX ADMIN — SODIUM CHLORIDE 75 MILLILITER(S): 9 INJECTION INTRAMUSCULAR; INTRAVENOUS; SUBCUTANEOUS at 16:23

## 2019-09-23 RX ADMIN — CARBIDOPA AND LEVODOPA 2 TABLET(S): 25; 100 TABLET ORAL at 14:18

## 2019-09-23 RX ADMIN — HEPARIN SODIUM 1200 UNIT(S)/HR: 5000 INJECTION INTRAVENOUS; SUBCUTANEOUS at 08:16

## 2019-09-23 RX ADMIN — SODIUM CHLORIDE 3 MILLILITER(S): 9 INJECTION INTRAMUSCULAR; INTRAVENOUS; SUBCUTANEOUS at 20:49

## 2019-09-23 RX ADMIN — INSULIN GLARGINE 10 UNIT(S): 100 INJECTION, SOLUTION SUBCUTANEOUS at 06:26

## 2019-09-23 NOTE — CHART NOTE - NSCHARTNOTEFT_GEN_A_CORE
WILFREDORLIN  [id number] 69y    Patient status post cath via right radial access, Site clean, dry, intact. Pulses present, capillary refill appropriate. Without hematoma. Will continue to  follow closely. Pt with triple vessel dz likely needs CABG evaluation at [facility]. Will have night staff monitor site, if stable will resume hep gtt 9pm tonight for NSTEMI

## 2019-09-23 NOTE — PROGRESS NOTE ADULT - SUBJECTIVE AND OBJECTIVE BOX
Patient seen and examined in bed.       MEDICATIONS  (STANDING):  amLODIPine   Tablet 2.5 milliGRAM(s) Oral daily  aspirin  chewable 81 milliGRAM(s) Oral daily  atorvastatin 10 milliGRAM(s) Oral at bedtime  carbidopa/levodopa  25/100 2 Tablet(s) Oral three times a day  dextrose 5%. 1000 milliLiter(s) (50 mL/Hr) IV Continuous <Continuous>  dextrose 5%. 1000 milliLiter(s) (50 mL/Hr) IV Continuous <Continuous>  dextrose 50% Injectable 12.5 Gram(s) IV Push once  dextrose 50% Injectable 25 Gram(s) IV Push once  dextrose 50% Injectable 25 Gram(s) IV Push once  heparin  Infusion.  Unit(s)/Hr (10 mL/Hr) IV Continuous <Continuous>  hydrochlorothiazide 25 milliGRAM(s) Oral at bedtime  influenza   Vaccine 0.5 milliLiter(s) IntraMuscular once  insulin glargine Injectable (LANTUS) 22 Unit(s) SubCutaneous every morning  insulin lispro (HumaLOG) corrective regimen sliding scale   SubCutaneous at bedtime  insulin lispro (HumaLOG) corrective regimen sliding scale   SubCutaneous every 6 hours  LORazepam     Tablet 0.5 milliGRAM(s) Oral once  metoprolol tartrate 12.5 milliGRAM(s) Oral two times a day  pantoprazole    Tablet 40 milliGRAM(s) Oral before breakfast  prasugrel 10 milliGRAM(s) Oral daily  sodium chloride 0.9% lock flush 3 milliLiter(s) IV Push every 8 hours  trihexyphenidyl 2 milliGRAM(s) Oral three times a day      VITAL:  T(C): , Max: 36.8 (19 @ 12:00)  T(F): , Max: 98.2 (19 @ 12:00)  HR: 60 (19 @ 06:24)  BP: 151/85 (19 @ 06:24)  RR: 17 (19 @ 06:24)  SpO2: 99% (19 @ 06:24)          PHYSICAL EXAM:    Constitutional: NAD  Neck:  No JVD  Respiratory: CTAB/L  Cardiovascular: S1 and S2  Gastrointestinal: BS+, soft, NT/ND  Extremities: No peripheral edema  Neurological: A/O x 3, no focal deficits  Psychiatric: Normal mood, normal affect  : No Javier  Skin: No rashes  Access: Not applicable    LABS:                        12.8   10.13 )-----------( 256      ( 23 Sep 2019 06:15 )             38.1         138  |  100  |  38<H>  ----------------------------<  119<H>  4.1   |  23  |  2.33<H>    Ca    10.1      23 Sep 2019 06:15  Phos  3.5       Mg     1.9         TPro  7.6  /  Alb  3.8  /  TBili  0.3  /  DBili  x   /  AST  11  /  ALT  < 4<L>  /  AlkPhos  74        Urine Studies:  Urinalysis Basic - ( 22 Sep 2019 13:00 )    Color: LIGHT YELLOW / Appearance: CLEAR / S.012 / pH: 6.5  Gluc: NEGATIVE / Ketone: NEGATIVE  / Bili: NEGATIVE / Urobili: NORMAL   Blood: NEGATIVE / Protein: 100 / Nitrite: NEGATIVE   Leuk Esterase: NEGATIVE / RBC: 0-2 / WBC 0-2   Sq Epi: OCC / Non Sq Epi: x / Bacteria: NEGATIVE      Protein, Random Urine: 116.3 mg/dL ( @ 13:00)  Creatinine, Random Urine: 44.80 mg/dL ( @ 13:00)    RADIOLOGY:    < from: US Duplex Venous Lower Ext Complete, Bilateral (19 @ 15:53) >  IMPRESSION:     No evidence of deep venous thrombosis in either lower extremity.    < end of copied text >      ASSESSMENT/PLAN:  70 yo M PMHx CAD s/p stent x 1 (), T2DM, HTN, Parkinson's and CKD stage 3b presents with chest pain.  He has an increase in troponin and will likely get cath this am.  Moderate risk for AUBRIE and the presence of proteinuria will increase that risk  Time of immobilization are common for him    1. Renal - No renal objection to cath; ~3.5g/day proteinuria; Renal sono completed    2. CVS - LHC this AM; 2.5mg po daily Norvasc, for tighter BP control, BP improving    3. Pulm - LE dopplers negative for DVT, VQ scan pending        CECILIA Queen  [facility]  [phone] Patient seen and examined in bed. No pain, no SOB. Plan for cath today.       MEDICATIONS  (STANDING):  amLODIPine   Tablet 2.5 milliGRAM(s) Oral daily  aspirin  chewable 81 milliGRAM(s) Oral daily  atorvastatin 10 milliGRAM(s) Oral at bedtime  carbidopa/levodopa  25/100 2 Tablet(s) Oral three times a day  dextrose 5%. 1000 milliLiter(s) (50 mL/Hr) IV Continuous <Continuous>  dextrose 5%. 1000 milliLiter(s) (50 mL/Hr) IV Continuous <Continuous>  dextrose 50% Injectable 12.5 Gram(s) IV Push once  dextrose 50% Injectable 25 Gram(s) IV Push once  dextrose 50% Injectable 25 Gram(s) IV Push once  heparin  Infusion.  Unit(s)/Hr (10 mL/Hr) IV Continuous <Continuous>  hydrochlorothiazide 25 milliGRAM(s) Oral at bedtime  influenza   Vaccine 0.5 milliLiter(s) IntraMuscular once  insulin glargine Injectable (LANTUS) 22 Unit(s) SubCutaneous every morning  insulin lispro (HumaLOG) corrective regimen sliding scale   SubCutaneous at bedtime  insulin lispro (HumaLOG) corrective regimen sliding scale   SubCutaneous every 6 hours  LORazepam     Tablet 0.5 milliGRAM(s) Oral once  metoprolol tartrate 12.5 milliGRAM(s) Oral two times a day  pantoprazole    Tablet 40 milliGRAM(s) Oral before breakfast  prasugrel 10 milliGRAM(s) Oral daily  sodium chloride 0.9% lock flush 3 milliLiter(s) IV Push every 8 hours  trihexyphenidyl 2 milliGRAM(s) Oral three times a day      VITAL:  T(C): , Max: 36.8 (19 @ 12:00)  T(F): , Max: 98.2 (19 @ 12:00)  HR: 60 (19 @ 06:24)  BP: 151/85 (19 @ 06:24)  RR: 17 (19 @ 06:24)  SpO2: 99% (19 @ 06:24)          PHYSICAL EXAM:    Constitutional: NAD  Neck:  No JVD  Respiratory: CTAB/L  Cardiovascular: S1 and S2  Gastrointestinal: BS+, soft, NT/ND  Extremities: No peripheral edema  Neurological: A/O x 3, no focal deficits  Psychiatric: Normal mood, normal affect  : No Javier  Skin: No rashes    LABS:                        12.8   10.13 )-----------( 256      ( 23 Sep 2019 06:15 )             38.1         138  |  100  |  38<H>  ----------------------------<  119<H>  4.1   |  23  |  2.33<H>    Ca    10.1      23 Sep 2019 06:15  Phos  3.5       Mg     1.9         TPro  7.6  /  Alb  3.8  /  TBili  0.3  /  DBili  x   /  AST  11  /  ALT  < 4<L>  /  AlkPhos  74        Urine Studies:  Urinalysis Basic - ( 22 Sep 2019 13:00 )    Color: LIGHT YELLOW / Appearance: CLEAR / S.012 / pH: 6.5  Gluc: NEGATIVE / Ketone: NEGATIVE  / Bili: NEGATIVE / Urobili: NORMAL   Blood: NEGATIVE / Protein: 100 / Nitrite: NEGATIVE   Leuk Esterase: NEGATIVE / RBC: 0-2 / WBC 0-2   Sq Epi: OCC / Non Sq Epi: x / Bacteria: NEGATIVE      Protein, Random Urine: 116.3 mg/dL ( @ 13:00)  Creatinine, Random Urine: 44.80 mg/dL ( @ 13:00)    RADIOLOGY:    < from: US Duplex Venous Lower Ext Complete, Bilateral (19 @ 15:53) >  IMPRESSION:     No evidence of deep venous thrombosis in either lower extremity.    < end of copied text >      ASSESSMENT/PLAN:  70 yo M PMHx CAD s/p stent x 1 (), T2DM, HTN, Parkinson's and CKD stage 3b presents with chest pain.  He has an increase in troponin and will likely get cath this am.  Moderate risk for AUBRIE and the presence of proteinuria will increase that risk  Time of immobilization are common for him    1. Renal - No renal objection to cath today; ~3.5g/day proteinuria; Renal sono completed; NS@70ml/hr x 9 hours - started this AM at 9AM, monitor for AUBRIE    2. CVS - LHC this AM; 2.5mg po daily Norvasc, for tighter BP control, BP improving    3. Pulm - LE dopplers negative for DVT, VQ scan pending        CECILIA Queen  [facility]  [phone] Patient seen and examined in bed. No pain, no SOB. Plan for cath today.       MEDICATIONS  (STANDING):  amLODIPine   Tablet 2.5 milliGRAM(s) Oral daily  aspirin  chewable 81 milliGRAM(s) Oral daily  atorvastatin 10 milliGRAM(s) Oral at bedtime  carbidopa/levodopa  25/100 2 Tablet(s) Oral three times a day  dextrose 5%. 1000 milliLiter(s) (50 mL/Hr) IV Continuous <Continuous>  dextrose 5%. 1000 milliLiter(s) (50 mL/Hr) IV Continuous <Continuous>  dextrose 50% Injectable 12.5 Gram(s) IV Push once  dextrose 50% Injectable 25 Gram(s) IV Push once  dextrose 50% Injectable 25 Gram(s) IV Push once  heparin  Infusion.  Unit(s)/Hr (10 mL/Hr) IV Continuous <Continuous>  hydrochlorothiazide 25 milliGRAM(s) Oral at bedtime  influenza   Vaccine 0.5 milliLiter(s) IntraMuscular once  insulin glargine Injectable (LANTUS) 22 Unit(s) SubCutaneous every morning  insulin lispro (HumaLOG) corrective regimen sliding scale   SubCutaneous at bedtime  insulin lispro (HumaLOG) corrective regimen sliding scale   SubCutaneous every 6 hours  LORazepam     Tablet 0.5 milliGRAM(s) Oral once  metoprolol tartrate 12.5 milliGRAM(s) Oral two times a day  pantoprazole    Tablet 40 milliGRAM(s) Oral before breakfast  prasugrel 10 milliGRAM(s) Oral daily  sodium chloride 0.9% lock flush 3 milliLiter(s) IV Push every 8 hours  trihexyphenidyl 2 milliGRAM(s) Oral three times a day      VITAL:  T(C): , Max: 36.8 (19 @ 12:00)  T(F): , Max: 98.2 (19 @ 12:00)  HR: 60 (19 @ 06:24)  BP: 151/85 (19 @ 06:24)  RR: 17 (19 @ 06:24)  SpO2: 99% (19 @ 06:24)          PHYSICAL EXAM:    Constitutional: NAD  Neck:  No JVD  Respiratory: CTAB/L  Cardiovascular: S1 and S2  Gastrointestinal: BS+, soft, NT/ND  Extremities: No peripheral edema  Neurological: A/O x 3, no focal deficits  Psychiatric: Normal mood, normal affect  : No Javier  Skin: No rashes    LABS:                        12.8   10.13 )-----------( 256      ( 23 Sep 2019 06:15 )             38.1         138  |  100  |  38<H>  ----------------------------<  119<H>  4.1   |  23  |  2.33<H>    Ca    10.1      23 Sep 2019 06:15  Phos  3.5       Mg     1.9         TPro  7.6  /  Alb  3.8  /  TBili  0.3  /  DBili  x   /  AST  11  /  ALT  < 4<L>  /  AlkPhos  74        Urine Studies:  Urinalysis Basic - ( 22 Sep 2019 13:00 )    Color: LIGHT YELLOW / Appearance: CLEAR / S.012 / pH: 6.5  Gluc: NEGATIVE / Ketone: NEGATIVE  / Bili: NEGATIVE / Urobili: NORMAL   Blood: NEGATIVE / Protein: 100 / Nitrite: NEGATIVE   Leuk Esterase: NEGATIVE / RBC: 0-2 / WBC 0-2   Sq Epi: OCC / Non Sq Epi: x / Bacteria: NEGATIVE      Protein, Random Urine: 116.3 mg/dL ( @ 13:00)  Creatinine, Random Urine: 44.80 mg/dL ( @ 13:00)    RADIOLOGY:    < from: US Duplex Venous Lower Ext Complete, Bilateral (19 @ 15:53) >  IMPRESSION:     No evidence of deep venous thrombosis in either lower extremity.    < end of copied text >

## 2019-09-23 NOTE — CONSULT NOTE ADULT - ATTENDING COMMENTS
Agree with above assessment and plan as outlined above.    - f/u echo and stress    Thierry Shetty MD, [facility]  [facility] [phone]
[facility]

## 2019-09-23 NOTE — CONSULT NOTE ADULT - PROBLEM SELECTOR RECOMMENDATION 9
he has NSTEMI, has hx of stent placement before , high troponins and mild lV segmental dysfunction: His current presentation points towards cardiac etiology of his chest pain rather then VTE: he has no tachycardia, though on low dose b blockers, and his oxygenation on room air is excellent: His dopplers are negative too: High D Dimer could be due to renal failure: For now, will proceed with coronary cath: Heis very reluctant to do vq scan at this time anyway: would review once cardiac cath is done:

## 2019-09-23 NOTE — CONSULT NOTE ADULT - CONSULT REASON
ACS
CKD stage 3
chest pain
medical management
safety of ac with meningioma
high d dimer: for cath today

## 2019-09-23 NOTE — CONSULT NOTE ADULT - ASSESSMENT
68 yo M PMHx CAD s/p stent x 1 (2010), T2DM, HTN, Parkinson's presenting with c/o 10/10 chest pain described as burning radiating from abdomen to midsternum x 1 day.    Problem/Plan - 1:  ·  Problem: Chest pain.  Plan: telemonitor  cards fu   ischemia bailey as per cards     Problem/Plan - 2:  ·  Problem: CKD (chronic kidney disease).  Plan: Cr 2.28  Hx of Cr elevated in 2017--> 2.43   will hold losartan  trend bmp.   renal; following     Problem/Plan - 3:  ·  Problem: Hypertension.  Plan: cw current meds   DASH diet.   Problem/Plan - 4:  ·  Problem: Type 2 diabetes mellitus with chronic kidney disease, with long-term current use of insulin, unspecified CKD stage.  Plan: check hgb a1c  low carb diet  monitor FS  ISS     Problem/Plan - 5:  ·  Problem: Hypercholesterolemia.  Plan: check flp   continue statin.     Problem/Plan - 6:  Problem: Need for prophylactic measure. Plan: DVT ppx: continue prasugrel 10mg  fall precautions.
70 yo M PMHx CAD s/p stent x 1 (2010), T2DM, HTN, Parkinson's and CKD stage 3b pw chest pain.  He has an increase in troponin and will likely get cath in am  Moderate risk for AUBRIE and the presence of proteinuria will increase that risk  Time of immobilization are common for him    1 Renal- No renal objection to cath;   Check UA and quantify proteinuria;  Renal sono    2 CVS- LHC in am;  Start Norvasc for tighter BP control    3 Pulm-Check LE dopplers please
69M h/o CAD, DM, HTN, parkinson's, CKD, presents with unstable angina, chest pain has now resolved.  PLAN:   Patient had chest pain in the setting of just taking meds and climbing stairs. Unlikely this is ACS. EKG not suggestive of ACS. Dr. Carpenter, interventional cardiologist, notified of EKG and no need for cath. Would not recommend heparin gtt at this time. Continue aspirin and effient, his home medications (dual antiplatelet for prior stent). BP is well-controlled at this time. Troponin is 14 delta 27 in setting of CKD. Can consider nuc stress in am and ECHO. Plan discussed with Dr. Roman.
70 yo M PMHx CAD s/p stent x 1 (2010), T2DM, HTN, Parkinson's presenting with c/o 10/10 chest pain described as burning radiating from abdomen to midsternum x 1 day.

## 2019-09-23 NOTE — CHART NOTE - NSCHARTNOTEFT_GEN_A_CORE
Pt is s/p LHC: pLAD 70%, OM1 90%, mRCA 80%, RRA accessed  IV Heparin to be restarted and transfer to [facility] for CABG  Pt seen sitting up in chair, no complaints    HR 62bmp  /83 at 21:00    RRA site with clean, dry bandage in place, no swelling or pain    A/P  Stable  OK to start IV Heparin, ordered

## 2019-09-23 NOTE — PROGRESS NOTE ADULT - SUBJECTIVE AND OBJECTIVE BOX
Patient denies chest pain or shortness of breath.   Review of systems otherwise (-)  	  MEDICATIONS  (STANDING):  amLODIPine   Tablet 2.5 milliGRAM(s) Oral daily  aspirin  chewable 81 milliGRAM(s) Oral daily  atorvastatin 10 milliGRAM(s) Oral at bedtime  carbidopa/levodopa  25/100 2 Tablet(s) Oral three times a day  dextrose 5%. 1000 milliLiter(s) (50 mL/Hr) IV Continuous <Continuous>  dextrose 5%. 1000 milliLiter(s) (50 mL/Hr) IV Continuous <Continuous>  dextrose 50% Injectable 12.5 Gram(s) IV Push once  dextrose 50% Injectable 25 Gram(s) IV Push once  dextrose 50% Injectable 25 Gram(s) IV Push once  heparin  Infusion.  Unit(s)/Hr (10 mL/Hr) IV Continuous <Continuous>  hydrochlorothiazide 25 milliGRAM(s) Oral at bedtime  influenza   Vaccine 0.5 milliLiter(s) IntraMuscular once  insulin glargine Injectable (LANTUS) 22 Unit(s) SubCutaneous every morning  insulin lispro (HumaLOG) corrective regimen sliding scale   SubCutaneous at bedtime  insulin lispro (HumaLOG) corrective regimen sliding scale   SubCutaneous every 6 hours  LORazepam     Tablet 0.5 milliGRAM(s) Oral once  metoprolol tartrate 12.5 milliGRAM(s) Oral two times a day  pantoprazole    Tablet 40 milliGRAM(s) Oral before breakfast  prasugrel 10 milliGRAM(s) Oral daily  sodium chloride 0.9% lock flush 3 milliLiter(s) IV Push every 8 hours  trihexyphenidyl 2 milliGRAM(s) Oral three times a day      LABS:	 	                      12.8   10.13 )-----------( 256      ( 23 Sep 2019 06:15 )             38.1     138  |  100  |  38<H>  ----------------------------<  119<H>  4.1   |  23  |  2.33<H>    Ca    10.1      23 Sep 2019 06:15  Phos  3.5     09-23  Mg     1.9     09-23    TPro  7.6  /  Alb  3.8  /  TBili  0.3  /  DBili  x   /  AST  11  /  ALT  < 4<L>  /  AlkPhos  74  09-21    Creatinine Trend: 2.33<--, 2.14<--, 2.28<--  COAGS:   PTT - ( 23 Sep 2019 06:15 )  PTT:41.7 SEC    PHYSICAL EXAM:  T(C): 36.5 (09-23-19 @ 06:24), Max: 36.8 (09-22-19 @ 12:00)  HR: 60 (09-23-19 @ 06:24) (53 - 71)  BP: 151/85 (09-23-19 @ 06:24) (135/70 - 193/92)  RR: 17 (09-23-19 @ 06:24) (17 - 18)  SpO2: 99% (09-23-19 @ 06:24) (97% - 100%)    Gen: Appears well in NAD  HEENT:  (-)icterus (-)pallor  CV: N S1 S2 1/6 JENNIFER (+)2 Pulses B/l  Resp:  Clear to ausculatation B/L, normal effort  GI: (+) BS Soft, NT, ND  Lymph:  (-)Edema, (-)obvious lymphadenopathy  Skin: Warm to touch, Normal turgor  Psych: Appropriate mood and affect    TELEMETRY: 	  SR    < from: Transthoracic Echocardiogram (09.22.19 @ 08:18) >  CONCLUSIONS:  1. Mitral annular calcification, otherwise normal mitral  valve. Mild mitral regurgitation.  2. Increased relative wall thickness with normal left  ventricular mass index, consistent with concentric left  ventricular remodeling.  3. Mild segmental left ventricular systolic dysfunction.  The anteroseptal and apical walls appear hypokinetic.  4. Mild diastolic dysfunction (Stage I).  5. The right ventricle is not well visualized; grossly  normal right ventricular systolic function.  ------------------------------------------------------------------------  Confirmed on  9/22/2019 - 12:25:06 by Jae Junior MD, [facility],  FASE, RPVI  ------------------------------------------    < end of copied text >      ASSESSMENT/PLAN: 	  70 yo M PMHx CAD s/p stent x 1 (2010), T2DM, HTN, Parkinson's presenting with c/o 10/10 chest pain described as burning radiating from abdomen to midsternum x 1 day.    --Pt reports he is unable to tolerate VQ scan due to claustrophobia (noted with elevated D Dimer)  --Pulm eval  --LE dopplers negative  --CTH negative  --given Mild segmental LV dysfxn, elevated Troponin T and chest pain, plan for LHC to r/o obs CAD

## 2019-09-23 NOTE — CONSULT NOTE ADULT - SUBJECTIVE AND OBJECTIVE BOX
Patient is a 69y old  Male who presents with a chief complaint of chest pain (23 Sep 2019 10:17)      HPI:  70 yo M PMHx CAD s/p stent x 1 (), T2DM, HTN, Parkinson's presenting with c/o 1010 chest pain described as burning radiating from abdomen to midsternum x 1 day. Patient reports symptoms started at 2 pm and resolved after 1 hour. Chest pain began after he ate lunch and was walking up a flight of stairs. He reports chest pain was associated with dyspnea, diaphoresis, and nausea. At baseline, he ambulates with cane/walker. Takes medications daily including aspirin and prasugrel. On ROS, reports chronic left leg swelling x 1-2 years.    In ED, Vitals: Temp: 97.9, HR: 90, Bp: 154/87, Sp02: 98% on NC. Trop 19-->27. In ED he was started on Heparin gtt for EKG which showed slight VIKI lead II,III. Cardiology was consulted and recommended echo, nst, and continue home meds. No need for heparin gtt currently. Patient denies chest pain at this time (21 Sep 2019 20:44)    pulmonary called as  his d dimer is high: On discussing with pt pt had chest pain and felt a littel SOB: he has had no PE or dvt before and his dopplers are ngative currently: his d dimer is elevated at this time: He says his SOBhas resolved: He has in other underlyinglung condition:       ?FOLLOWING PRESENT  [x ] Hx of PE/DVT, [x ] Hx COPD, [ x] Hx of Asthma, [y ] Hx of Hospitalization, [x ]  Hx of BiPAP/CPAP use, [ x] Hx of LETY    Allergies    adhesives (Rash)  latex (Urticaria)  No Known Drug Allergies    Intolerances        PAST MEDICAL & SURGICAL HISTORY:  Shoulder pain, left  Osteoarthritis  Panic attacks  Urinary frequency  Urinary incontinence  Nocturia: x2-3  Gastroesophageal reflux disease without esophagitis: diet controlled  Vertigo: not improved with meclizine in past  Insomnia  Autoimmune disease: started on prednisone on 12/23/15 for &quot;inflammation&quot; but does not know diagnosis  Leg swelling: left leg  CAD (coronary artery disease): s/p 1 stent in   Parkinson disease  Hypercholesterolemia  Diabetes Mellitus, Type 2  Hypertension  S/P angioplasty with stent: 1 stent, RCA  Status Post Cardiac Catheterization      FAMILY HISTORY:  Family history of pulmonary fibrosis (Sibling)  Family history of malaria  Family history of hypertension      Social History: [as a teenager  ] TOBACCO                  [ x ] ETOH                                 [ x ] IVDA/DRUGS    REVIEW OF SYSTEMS      General:	x    Skin/Breast:x  	  Ophthalmologic:x  	  ENMT:	x    Respiratory and Thorax: chest pain   	  Cardiovascular:	chest paoin, sob  x  Gastrointestinal:	    Genitourinary:	x    Musculoskeletal:	x  x  Neurological:	    Psychiatric:	  x  Hematology/Lymphatics:	x    Endocrine:	x  x  Allergic/Immunologic:	    MEDICATIONS  (STANDING):  amLODIPine   Tablet 2.5 milliGRAM(s) Oral daily  aspirin  chewable 81 milliGRAM(s) Oral daily  atorvastatin 10 milliGRAM(s) Oral at bedtime  carbidopa/levodopa  25/100 2 Tablet(s) Oral three times a day  dextrose 5%. 1000 milliLiter(s) (50 mL/Hr) IV Continuous <Continuous>  dextrose 5%. 1000 milliLiter(s) (50 mL/Hr) IV Continuous <Continuous>  dextrose 50% Injectable 12.5 Gram(s) IV Push once  dextrose 50% Injectable 25 Gram(s) IV Push once  dextrose 50% Injectable 25 Gram(s) IV Push once  heparin  Infusion.  Unit(s)/Hr (10 mL/Hr) IV Continuous <Continuous>  hydrochlorothiazide 25 milliGRAM(s) Oral at bedtime  influenza   Vaccine 0.5 milliLiter(s) IntraMuscular once  insulin glargine Injectable (LANTUS) 22 Unit(s) SubCutaneous every morning  insulin lispro (HumaLOG) corrective regimen sliding scale   SubCutaneous at bedtime  insulin lispro (HumaLOG) corrective regimen sliding scale   SubCutaneous every 6 hours  LORazepam     Tablet 0.5 milliGRAM(s) Oral once  metoprolol tartrate 12.5 milliGRAM(s) Oral two times a day  pantoprazole    Tablet 40 milliGRAM(s) Oral before breakfast  prasugrel 10 milliGRAM(s) Oral daily  sodium chloride 0.9% lock flush 3 milliLiter(s) IV Push every 8 hours  trihexyphenidyl 2 milliGRAM(s) Oral three times a day    MEDICATIONS  (PRN):  dextrose 40% Gel 15 Gram(s) Oral once PRN Blood Glucose LESS THAN 70 milliGRAM(s)/deciliter  glucagon  Injectable 1 milliGRAM(s) IntraMuscular once PRN Glucose LESS THAN 70 milligrams/deciliter  heparin  Injectable 4000 Unit(s) IV Push every 6 hours PRN For aPTT less than 40       Vital Signs Last 24 Hrs  T(C): 36.5 (23 Sep 2019 06:24), Max: 36.8 (22 Sep 2019 12:00)  T(F): 97.7 (23 Sep 2019 06:24), Max: 98.2 (22 Sep 2019 12:00)  HR: 60 (23 Sep 2019 06:24) (53 - 71)  BP: 151/85 (23 Sep 2019 06:24) (135/70 - 193/92)  BP(mean): --  RR: 17 (23 Sep 2019 06:24) (17 - 18)  SpO2: 99% (23 Sep 2019 06:24) (97% - 100%)        I&O's Summary      Physical Exam:   GENERAL: NAD, well-groomed, well-developed  HEENT: BRANDEN/   Atraumatic, Normocephalic  ENMT: No tonsillar erythema, exudates, or enlargement; Moist mucous membranes, Good dentition, No lesions  NECK: Supple, No JVD, Normal thyroid  CHEST/LUNG: Clear to auscultation bilaterally; No rales, rhonchi, wheezing, or rubs  CVS: Regular rate and rhythm; No murmurs, rubs, or gallops  GI: : Soft, Nontender, Nondistended; Bowel sounds present  NERVOUS SYSTEM:  Alert & Oriented X3  EXTREMITIES: - edema  LYMPH: No lymphadenopathy noted  SKIN: No rashes or lesions  ENDOCRINOLOGY: No Thyromegaly  PSYCH: Appropriate    Labs:    CARDIAC MARKERS ( 21 Sep 2019 23:30 )  x     / x     / 101 u/L / 4.16 ng/mL / x                                12.8   10.13 )-----------( 256      ( 23 Sep 2019 06:15 )             38.1                         10.9   9.59  )-----------( 237      ( 22 Sep 2019 05:30 )             32.6                         11.2   10.26 )-----------( 244      ( 21 Sep 2019 23:30 )             33.5                         11.3   12.15 )-----------( 264      ( 21 Sep 2019 15:30 )             32.9     09-23    138  |  100  |  38<H>  ----------------------------<  119<H>  4.1   |  23  |  2.33<H>      139  |  98  |  39<H>  ----------------------------<  147<H>  4.0   |  22  |  2.14<H>      137  |  101  |  42<H>  ----------------------------<  241<H>  4.0   |  22  |  2.28<H>    Ca    10.1      23 Sep 2019 06:15  Ca    9.5      22 Sep 2019 05:30  Ca    9.5      21 Sep 2019 15:30  Phos  3.5       Phos  3.4       Mg     1.9       Mg     1.8         TPro  7.6  /  Alb  3.8  /  TBili  0.3  /  DBili  x   /  AST  11  /  ALT  < 4<L>  /  AlkPhos  74      CAPILLARY BLOOD GLUCOSE      POCT Blood Glucose.: 123 mg/dL (23 Sep 2019 05:55)  POCT Blood Glucose.: 143 mg/dL (22 Sep 2019 22:05)  POCT Blood Glucose.: 93 mg/dL (22 Sep 2019 17:02)  POCT Blood Glucose.: 134 mg/dL (22 Sep 2019 11:52)    LIVER FUNCTIONS - ( 21 Sep 2019 15:30 )  Alb: 3.8 g/dL / Pro: 7.6 g/dL / ALK PHOS: 74 u/L / ALT: < 4 u/L / AST: 11 u/L / GGT: x           PT/INR - ( 21 Sep 2019 15:30 )   PT: 11.9 SEC;   INR: 1.07          PTT - ( 23 Sep 2019 06:15 )  PTT:41.7 SEC  Urinalysis Basic - ( 22 Sep 2019 13:00 )    Color: LIGHT YELLOW / Appearance: CLEAR / S.012 / pH: 6.5  Gluc: NEGATIVE / Ketone: NEGATIVE  / Bili: NEGATIVE / Urobili: NORMAL   Blood: NEGATIVE / Protein: 100 / Nitrite: NEGATIVE   Leuk Esterase: NEGATIVE / RBC: 0-2 / WBC 0-2   Sq Epi: OCC / Non Sq Epi: x / Bacteria: NEGATIVE      D DImer  D-Dimer Assay, Quantitative: 342 ng/mL ( @ 13:00)  Serum Pro-Brain Natriuretic Peptide: 370.9 pg/mL ( @ 15:30)      Studies  Chest X-RAY  CT SCAN Chest   CT Abdomen  Venous Dopplers: LE:   Others        < from: CT Head No Cont (19 @ 16:18) >  RISON: CT head 2016 and MRI brain 10/4/2018.    TECHNIQUE: Axial noncontrast CT images from the skull base to the vertex   were obtained and submitted for interpretation. Coronal and sagittal   reformatted images were performed. Bone and soft tissue windows were   evaluated.    FINDINGS:     Unchanged right parietal extra-axial soft tissue lesion containing  calcifications measuring 3.9 x 1.7 x 2.0 cm compatible with a meningioma.   There is mild associated localized mass effect. No midline shift. No   acute intracranial hemorrhage or abnormal extra-axial fluid collection.    The gray-white matter junction is grossly preserved. Mild patchy   hypodensities in the periventricular and subcortical white matter   compatible with microvascular ischemic changes.    Mild age-related cerebral volume loss with proportional sulcal and   ventricular prominence. No hydrocephalus. Basal cisterns are patent.     Paranasal sinuses and mastoid air cells are clear. Calvarium is intact.     IMPRESSION:     No acute intracranial bleeding, mass effect, or shift.     Stable right parietal extra-axial mass compatiblewith a meningioma.              MARGRET LEWIS M.D., RADIOLGY RESIDENT  This document has been electronically signed.  FIDEL RICHARDSON M.D., ATTENDING RADIOLOGIST  This document has been electronically signed. Sep 23 2019  9:13AM        < end of copied text >      < from: Xray Chest 1 View- PORTABLE-Urgent (19 @ 16:03) >    EXAM:  XR CHEST PORTABLE URGENT 1V        PROCEDURE DATE:  Sep 21 2019         INTERPRETATION:  CLINICAL INDICATION: chest pain    EXAM:  Portable AP chest from 2019 at 1603. Compared to prior study from   2016.    IMPRESSION:  Left CP angle excluded from view. Sharp right CP angle. Clear remaining   visualized lungs. No pneumothorax.    Stable cardiac and mediastinal silhouettes.    Trachea midline.    Generalized mild osteopenia and mild spinal degenerative change again   noted.      < from: US Duplex Venous Lower Ext Complete, Bilateral (19 @ 15:53) >    PROCEDURE DATE:  Sep 22 2019         INTERPRETATION:  CLINICAL INFORMATION: 69-year-old man with lower   extremity swelling    COMPARISON: None available.    TECHNIQUE: Duplex sonography of the BILATERALLOWER extremity veins with   color and spectral Doppler, with and without compression.      FINDINGS:    There is normal compressibility of the bilateral common femoral, femoral   and popliteal veins.     Doppler examination shows normal spontaneous and phasic flow.    No calf vein thrombosis is detected. The peroneal veins were not   visualized.    IMPRESSION:     No evidence of deep venous thrombosis in either lower extremity.                        ARCENIO PORTER M.D., ATTENDING RADIOLOGIST  This document has been electronically signed. Sep 23 2019  8:42AM    < end of copied text >                JENNIFER MENCHACA M.D., ATTENDING RADIOLOGIST  This document has been electronically signed. Sep 21 2019  4:19PM        < end of copied text >

## 2019-09-23 NOTE — PROGRESS NOTE ADULT - SUBJECTIVE AND OBJECTIVE BOX
Patient is a 69y old  Male who presents with a chief complaint of chest pain (23 Sep 2019 10:57)      INTERVAL HPI/OVERNIGHT EVENTS:  T(C): 36.2 (19 @ 10:24), Max: 36.7 (19 @ 17:00)  HR: 63 (19 @ 10:24) (53 - 70)  BP: 138/60 (19 @ 10:24) (135/70 - 193/92)  RR: 17 (19 @ 10:24) (17 - 18)  SpO2: 100% (19 @ 10:24) (97% - 100%)  Wt(kg): --  I&O's Summary      LABS:                        12.8   10.13 )-----------( 256      ( 23 Sep 2019 06:15 )             38.1         138  |  100  |  38<H>  ----------------------------<  119<H>  4.1   |  23  |  2.33<H>    Ca    10.1      23 Sep 2019 06:15  Phos  3.5       Mg     1.9           PTT - ( 23 Sep 2019 06:15 )  PTT:41.7 SEC  Urinalysis Basic - ( 22 Sep 2019 13:00 )    Color: LIGHT YELLOW / Appearance: CLEAR / S.012 / pH: 6.5  Gluc: NEGATIVE / Ketone: NEGATIVE  / Bili: NEGATIVE / Urobili: NORMAL   Blood: NEGATIVE / Protein: 100 / Nitrite: NEGATIVE   Leuk Esterase: NEGATIVE / RBC: 0-2 / WBC 0-2   Sq Epi: OCC / Non Sq Epi: x / Bacteria: NEGATIVE      CAPILLARY BLOOD GLUCOSE      POCT Blood Glucose.: 97 mg/dL (23 Sep 2019 14:14)  POCT Blood Glucose.: 123 mg/dL (23 Sep 2019 05:55)  POCT Blood Glucose.: 143 mg/dL (22 Sep 2019 22:05)  POCT Blood Glucose.: 93 mg/dL (22 Sep 2019 17:02)        Urinalysis Basic - ( 22 Sep 2019 13:00 )    Color: LIGHT YELLOW / Appearance: CLEAR / S.012 / pH: 6.5  Gluc: NEGATIVE / Ketone: NEGATIVE  / Bili: NEGATIVE / Urobili: NORMAL   Blood: NEGATIVE / Protein: 100 / Nitrite: NEGATIVE   Leuk Esterase: NEGATIVE / RBC: 0-2 / WBC 0-2   Sq Epi: OCC / Non Sq Epi: x / Bacteria: NEGATIVE        MEDICATIONS  (STANDING):  amLODIPine   Tablet 2.5 milliGRAM(s) Oral daily  aspirin  chewable 81 milliGRAM(s) Oral daily  atorvastatin 10 milliGRAM(s) Oral at bedtime  carbidopa/levodopa  25/100 2 Tablet(s) Oral three times a day  dextrose 5%. 1000 milliLiter(s) (50 mL/Hr) IV Continuous <Continuous>  dextrose 5%. 1000 milliLiter(s) (50 mL/Hr) IV Continuous <Continuous>  dextrose 50% Injectable 12.5 Gram(s) IV Push once  dextrose 50% Injectable 25 Gram(s) IV Push once  dextrose 50% Injectable 25 Gram(s) IV Push once  hydrochlorothiazide 25 milliGRAM(s) Oral at bedtime  influenza   Vaccine 0.5 milliLiter(s) IntraMuscular once  insulin glargine Injectable (LANTUS) 22 Unit(s) SubCutaneous every morning  insulin lispro (HumaLOG) corrective regimen sliding scale   SubCutaneous at bedtime  insulin lispro (HumaLOG) corrective regimen sliding scale   SubCutaneous every 6 hours  LORazepam     Tablet 0.5 milliGRAM(s) Oral once  metoprolol tartrate 12.5 milliGRAM(s) Oral two times a day  pantoprazole    Tablet 40 milliGRAM(s) Oral before breakfast  sodium chloride 0.9% lock flush 3 milliLiter(s) IV Push every 8 hours  sodium chloride 0.9%. 500 milliLiter(s) (75 mL/Hr) IV Continuous <Continuous>  trihexyphenidyl 2 milliGRAM(s) Oral three times a day    MEDICATIONS  (PRN):  dextrose 40% Gel 15 Gram(s) Oral once PRN Blood Glucose LESS THAN 70 milliGRAM(s)/deciliter  glucagon  Injectable 1 milliGRAM(s) IntraMuscular once PRN Glucose LESS THAN 70 milligrams/deciliter          PHYSICAL EXAM:  GENERAL: NAD, well-groomed, well-developed  HEAD:  Atraumatic, Normocephalic  CHEST/LUNG: Clear to percussion bilaterally; No rales, rhonchi, wheezing, or rubs  HEART: Regular rate and rhythm; No murmurs, rubs, or gallops  ABDOMEN: Soft, Nontender, Nondistended; Bowel sounds present  EXTREMITIES:  2+ Peripheral Pulses, No clubbing, cyanosis, or edema  LYMPH: No lymphadenopathy noted  SKIN: No rashes or lesions    Care Discussed with Consultants/Other Providers [ ] YES  [ ] NO

## 2019-09-24 ENCOUNTER — TRANSCRIPTION ENCOUNTER (OUTPATIENT)
Age: 69
End: 2019-09-24

## 2019-09-24 LAB
ANION GAP SERPL CALC-SCNC: 16 MMO/L — HIGH (ref 7–14)
APTT BLD: 34.1 SEC — SIGNIFICANT CHANGE UP (ref 27.5–36.3)
APTT BLD: 40.7 SEC — HIGH (ref 27.5–36.3)
APTT BLD: 55.5 SEC — HIGH (ref 27.5–36.3)
APTT BLD: 59.9 SEC — HIGH (ref 27.5–36.3)
BUN SERPL-MCNC: 39 MG/DL — HIGH (ref 7–23)
CALCIUM SERPL-MCNC: 9.9 MG/DL — SIGNIFICANT CHANGE UP (ref 8.4–10.5)
CHLORIDE SERPL-SCNC: 99 MMOL/L — SIGNIFICANT CHANGE UP (ref 98–107)
CO2 SERPL-SCNC: 20 MMOL/L — LOW (ref 22–31)
CREAT SERPL-MCNC: 2.15 MG/DL — HIGH (ref 0.5–1.3)
GLUCOSE BLDC GLUCOMTR-MCNC: 115 MG/DL — HIGH (ref 70–99)
GLUCOSE BLDC GLUCOMTR-MCNC: 135 MG/DL — HIGH (ref 70–99)
GLUCOSE BLDC GLUCOMTR-MCNC: 143 MG/DL — HIGH (ref 70–99)
GLUCOSE BLDC GLUCOMTR-MCNC: 156 MG/DL — HIGH (ref 70–99)
GLUCOSE SERPL-MCNC: 145 MG/DL — HIGH (ref 70–99)
HCT VFR BLD CALC: 33.5 % — LOW (ref 39–50)
HCT VFR BLD CALC: 33.9 % — LOW (ref 39–50)
HGB BLD-MCNC: 11.3 G/DL — LOW (ref 13–17)
HGB BLD-MCNC: 11.3 G/DL — LOW (ref 13–17)
MAGNESIUM SERPL-MCNC: 1.8 MG/DL — SIGNIFICANT CHANGE UP (ref 1.6–2.6)
MCHC RBC-ENTMCNC: 30.3 PG — SIGNIFICANT CHANGE UP (ref 27–34)
MCHC RBC-ENTMCNC: 30.6 PG — SIGNIFICANT CHANGE UP (ref 27–34)
MCHC RBC-ENTMCNC: 33.3 % — SIGNIFICANT CHANGE UP (ref 32–36)
MCHC RBC-ENTMCNC: 33.7 % — SIGNIFICANT CHANGE UP (ref 32–36)
MCV RBC AUTO: 90.8 FL — SIGNIFICANT CHANGE UP (ref 80–100)
MCV RBC AUTO: 90.9 FL — SIGNIFICANT CHANGE UP (ref 80–100)
NRBC # FLD: 0 K/UL — SIGNIFICANT CHANGE UP (ref 0–0)
NRBC # FLD: 0.02 K/UL — SIGNIFICANT CHANGE UP (ref 0–0)
PHOSPHATE SERPL-MCNC: 3.5 MG/DL — SIGNIFICANT CHANGE UP (ref 2.5–4.5)
PLATELET # BLD AUTO: 224 K/UL — SIGNIFICANT CHANGE UP (ref 150–400)
PLATELET # BLD AUTO: 241 K/UL — SIGNIFICANT CHANGE UP (ref 150–400)
PMV BLD: 11.5 FL — SIGNIFICANT CHANGE UP (ref 7–13)
PMV BLD: 11.5 FL — SIGNIFICANT CHANGE UP (ref 7–13)
POTASSIUM SERPL-MCNC: 4 MMOL/L — SIGNIFICANT CHANGE UP (ref 3.5–5.3)
POTASSIUM SERPL-SCNC: 4 MMOL/L — SIGNIFICANT CHANGE UP (ref 3.5–5.3)
RBC # BLD: 3.69 M/UL — LOW (ref 4.2–5.8)
RBC # BLD: 3.73 M/UL — LOW (ref 4.2–5.8)
RBC # FLD: 13.2 % — SIGNIFICANT CHANGE UP (ref 10.3–14.5)
RBC # FLD: 13.2 % — SIGNIFICANT CHANGE UP (ref 10.3–14.5)
SODIUM SERPL-SCNC: 135 MMOL/L — SIGNIFICANT CHANGE UP (ref 135–145)
WBC # BLD: 8.78 K/UL — SIGNIFICANT CHANGE UP (ref 3.8–10.5)
WBC # BLD: 8.95 K/UL — SIGNIFICANT CHANGE UP (ref 3.8–10.5)
WBC # FLD AUTO: 8.78 K/UL — SIGNIFICANT CHANGE UP (ref 3.8–10.5)
WBC # FLD AUTO: 8.95 K/UL — SIGNIFICANT CHANGE UP (ref 3.8–10.5)

## 2019-09-24 PROCEDURE — 71045 X-RAY EXAM CHEST 1 VIEW: CPT | Mod: 26

## 2019-09-24 RX ORDER — ATORVASTATIN CALCIUM 80 MG/1
80 TABLET, FILM COATED ORAL AT BEDTIME
Refills: 0 | Status: DISCONTINUED | OUTPATIENT
Start: 2019-09-24 | End: 2019-09-25

## 2019-09-24 RX ADMIN — Medication 1: at 12:31

## 2019-09-24 RX ADMIN — SODIUM CHLORIDE 3 MILLILITER(S): 9 INJECTION INTRAMUSCULAR; INTRAVENOUS; SUBCUTANEOUS at 05:39

## 2019-09-24 RX ADMIN — SODIUM CHLORIDE 3 MILLILITER(S): 9 INJECTION INTRAMUSCULAR; INTRAVENOUS; SUBCUTANEOUS at 22:13

## 2019-09-24 RX ADMIN — CARBIDOPA AND LEVODOPA 2 TABLET(S): 25; 100 TABLET ORAL at 14:45

## 2019-09-24 RX ADMIN — HEPARIN SODIUM 1200 UNIT(S)/HR: 5000 INJECTION INTRAVENOUS; SUBCUTANEOUS at 07:19

## 2019-09-24 RX ADMIN — Medication 2 MILLIGRAM(S): at 14:45

## 2019-09-24 RX ADMIN — Medication 81 MILLIGRAM(S): at 14:45

## 2019-09-24 RX ADMIN — SODIUM CHLORIDE 3 MILLILITER(S): 9 INJECTION INTRAMUSCULAR; INTRAVENOUS; SUBCUTANEOUS at 14:48

## 2019-09-24 RX ADMIN — HEPARIN SODIUM 1200 UNIT(S)/HR: 5000 INJECTION INTRAVENOUS; SUBCUTANEOUS at 14:47

## 2019-09-24 RX ADMIN — HEPARIN SODIUM 1000 UNIT(S)/HR: 5000 INJECTION INTRAVENOUS; SUBCUTANEOUS at 00:48

## 2019-09-24 RX ADMIN — Medication 12.5 MILLIGRAM(S): at 05:45

## 2019-09-24 RX ADMIN — ATORVASTATIN CALCIUM 80 MILLIGRAM(S): 80 TABLET, FILM COATED ORAL at 22:12

## 2019-09-24 RX ADMIN — CARBIDOPA AND LEVODOPA 2 TABLET(S): 25; 100 TABLET ORAL at 21:59

## 2019-09-24 RX ADMIN — HEPARIN SODIUM 1200 UNIT(S)/HR: 5000 INJECTION INTRAVENOUS; SUBCUTANEOUS at 22:39

## 2019-09-24 RX ADMIN — CARBIDOPA AND LEVODOPA 2 TABLET(S): 25; 100 TABLET ORAL at 05:45

## 2019-09-24 RX ADMIN — AMLODIPINE BESYLATE 2.5 MILLIGRAM(S): 2.5 TABLET ORAL at 05:45

## 2019-09-24 RX ADMIN — Medication 12.5 MILLIGRAM(S): at 17:59

## 2019-09-24 RX ADMIN — Medication 25 MILLIGRAM(S): at 22:01

## 2019-09-24 RX ADMIN — PANTOPRAZOLE SODIUM 40 MILLIGRAM(S): 20 TABLET, DELAYED RELEASE ORAL at 05:45

## 2019-09-24 RX ADMIN — INSULIN GLARGINE 22 UNIT(S): 100 INJECTION, SOLUTION SUBCUTANEOUS at 08:07

## 2019-09-24 RX ADMIN — Medication 2 MILLIGRAM(S): at 22:02

## 2019-09-24 RX ADMIN — Medication 2 MILLIGRAM(S): at 05:45

## 2019-09-24 NOTE — DISCHARGE NOTE PROVIDER - NSDCFUSCHEDAPPT_GEN_ALL_CORE_FT
ORLIN HARDIN ; 12/17/2019 ; NPP [facility] ORLIN HARDIN ; 12/17/2019 ; NPP Neuro [facility] ORLIN HARDIN ; 12/17/2019 ; NPP Neuro [facility] ORLIN HARDIN ; 12/17/2019 ; NPP Neuro [facility] ORLIN HARDIN ; 12/17/2019 ; NPP Neuro [facility] ORLIN HARDIN ; 12/17/2019 ; NPP Neuro [facility]

## 2019-09-24 NOTE — PROGRESS NOTE ADULT - SUBJECTIVE AND OBJECTIVE BOX
Patient is a 69y old  Male who presents with a chief complaint of chest pain (24 Sep 2019 08:54)      Any change in ROS: junior nieveso k : Found to hiral Hedrick cath : awaiting transfer to [facility] for cabg    MEDICATIONS  (STANDING):  amLODIPine   Tablet 2.5 milliGRAM(s) Oral daily  aspirin  chewable 81 milliGRAM(s) Oral daily  atorvastatin 80 milliGRAM(s) Oral at bedtime  carbidopa/levodopa  25/100 2 Tablet(s) Oral three times a day  dextrose 5%. 1000 milliLiter(s) (50 mL/Hr) IV Continuous <Continuous>  dextrose 5%. 1000 milliLiter(s) (50 mL/Hr) IV Continuous <Continuous>  dextrose 50% Injectable 12.5 Gram(s) IV Push once  dextrose 50% Injectable 25 Gram(s) IV Push once  dextrose 50% Injectable 25 Gram(s) IV Push once  heparin  Infusion.  Unit(s)/Hr (10 mL/Hr) IV Continuous <Continuous>  hydrochlorothiazide 25 milliGRAM(s) Oral at bedtime  influenza   Vaccine 0.5 milliLiter(s) IntraMuscular once  insulin glargine Injectable (LANTUS) 22 Unit(s) SubCutaneous every morning  insulin lispro (HumaLOG) corrective regimen sliding scale   SubCutaneous at bedtime  insulin lispro (HumaLOG) corrective regimen sliding scale   SubCutaneous three times a day before meals  LORazepam     Tablet 0.5 milliGRAM(s) Oral once  metoprolol tartrate 12.5 milliGRAM(s) Oral two times a day  pantoprazole    Tablet 40 milliGRAM(s) Oral before breakfast  sodium chloride 0.9% lock flush 3 milliLiter(s) IV Push every 8 hours  sodium chloride 0.9%. 500 milliLiter(s) (75 mL/Hr) IV Continuous <Continuous>  trihexyphenidyl 2 milliGRAM(s) Oral three times a day    MEDICATIONS  (PRN):  dextrose 40% Gel 15 Gram(s) Oral once PRN Blood Glucose LESS THAN 70 milliGRAM(s)/deciliter  glucagon  Injectable 1 milliGRAM(s) IntraMuscular once PRN Glucose LESS THAN 70 milligrams/deciliter  heparin  Injectable 6000 Unit(s) IV Push every 6 hours PRN For aPTT less than 40    Vital Signs Last 24 Hrs  T(C): 36.7 (24 Sep 2019 09:30), Max: 36.8 (24 Sep 2019 01:30)  T(F): 98 (24 Sep 2019 09:30), Max: 98.3 (24 Sep 2019 01:30)  HR: 61 (24 Sep 2019 09:30) (61 - 64)  BP: 145/68 (24 Sep 2019 09:30) (142/77 - 176/89)  BP(mean): --  RR: 16 (24 Sep 2019 09:30) (16 - 18)  SpO2: 98% (24 Sep 2019 09:30) (97% - 100%)    I&O's Summary        Physical Exam:   GENERAL: NAD, well-groomed, well-developed  HEENT: BRANDEN/   Atraumatic, Normocephalic  ENMT: No tonsillar erythema, exudates, or enlargement; Moist mucous membranes, Good dentition, No lesions  NECK: Supple, No JVD, Normal thyroid  CHEST/LUNG: Clear to auscultaion, ; No rales, rhonchi, wheezing, or rubs  CVS: Regular rate and rhythm; No murmurs, rubs, or gallops  GI: : Soft, Nontender, Nondistended; Bowel sounds present  NERVOUS SYSTEM:  Alert & Oriented X3  EXTREMITIES:  2+ Peripheral Pulses, No clubbing, cyanosis, or edema  LYMPH: No lymphadenopathy noted  SKIN: No rashes or lesions  ENDOCRINOLOGY: No Thyromegaly  PSYCH: Appropriate    Labs:                              11.3   8.78  )-----------( 224      ( 24 Sep 2019 06:35 )             33.5                         12.8   10.13 )-----------( 256      ( 23 Sep 2019 06:15 )             38.1                         10.9   9.59  )-----------( 237      ( 22 Sep 2019 05:30 )             32.6                         11.2   10.26 )-----------( 244      ( 21 Sep 2019 23:30 )             33.5                         11.3   12.15 )-----------( 264      ( 21 Sep 2019 15:30 )             32.9     09-24    135  |  99  |  39<H>  ----------------------------<  145<H>  4.0   |  20<L>  |  2.15<H>  09    138  |  100  |  38<H>  ----------------------------<  119<H>  4.1   |  23  |  2.33<H>      139  |  98  |  39<H>  ----------------------------<  147<H>  4.0   |  22  |  2.14<H>      137  |  101  |  42<H>  ----------------------------<  241<H>  4.0   |  22  |  2.28<H>    Ca    9.9      24 Sep 2019 06:35  Ca    10.1      23 Sep 2019 06:15  Phos  3.5       Phos  3.5       Mg     1.8       Mg     1.9         TPro  7.6  /  Alb  3.8  /  TBili  0.3  /  DBili  x   /  AST  11  /  ALT  < 4<L>  /  AlkPhos  74      CAPILLARY BLOOD GLUCOSE      POCT Blood Glucose.: 135 mg/dL (24 Sep 2019 07:46)  POCT Blood Glucose.: 132 mg/dL (23 Sep 2019 20:47)  POCT Blood Glucose.: 142 mg/dL (23 Sep 2019 17:48)  POCT Blood Glucose.: 97 mg/dL (23 Sep 2019 14:14)        PTT - ( 24 Sep 2019 06:35 )  PTT:40.7 SEC  Urinalysis Basic - ( 22 Sep 2019 13:00 )    Color: LIGHT YELLOW / Appearance: CLEAR / S.012 / pH: 6.5  Gluc: NEGATIVE / Ketone: NEGATIVE  / Bili: NEGATIVE / Urobili: NORMAL   Blood: NEGATIVE / Protein: 100 / Nitrite: NEGATIVE   Leuk Esterase: NEGATIVE / RBC: 0-2 / WBC 0-2   Sq Epi: OCC / Non Sq Epi: x / Bacteria: NEGATIVE      D-Dimer Assay, Quantitative: 342 ng/mL ( @ 13:00)  Serum Pro-Brain Natriuretic Peptide: 370.9 pg/mL ( @ 15:30)        RECENT CULTURES:        RESPIRATORY CULTURES:      < from: Xray Chest 1 View- PORTABLE-Urgent (19 @ 10:53) >  EXAM:  XR CHEST PORTABLE URGENT 1V        PROCEDURE DATE:  Sep 24 2019         INTERPRETATION:  TIME OF EXAM: 2019 at 10:43 AM.    CLINICAL INFORMATION: Needed for VQ scan.    COMPARISON:  2019    TECHNIQUE:   AP Portable chest x-ray. Jewelry obscures part of the upper   chest.    INTERPRETATION:     Heart size and the mediastinum cannot be accurately evaluated on this   projection. The thoracic aorta is calcified.  The visualized lungs are clear. No pleural effusionor pneumothorax is   seen.  There is osteoarthritic degenerative change of the spine.            IMPRESSION:  The visualized lungs are clear.        < end of copied text >      Studies  Chest X-RAY  CT SCAN Chest   Venous Dopplers: LE:   CT Abdomen  Others

## 2019-09-24 NOTE — DISCHARGE NOTE PROVIDER - NSDCCPCAREPLAN_GEN_ALL_CORE_FT
PRINCIPAL DISCHARGE DIAGNOSIS  Diagnosis: Non-ST elevation MI (NSTEMI)  Assessment and Plan of Treatment: continue hep gtt, cath noted triple vessel disease transfer to [facility] for further care      SECONDARY DISCHARGE DIAGNOSES  Diagnosis: Positive D dimer  Assessment and Plan of Treatment: awaiting VQ scan    Diagnosis: Renal lesion  Assessment and Plan of Treatment: incidental finding of right renal hemorrhagic cyst    Diagnosis: Hypercholesterolemia  Assessment and Plan of Treatment: Low fat diet, exercise daily and continue current medications. Follow up with primary care physician and cardiologist for management.    Diagnosis: Hypertension  Assessment and Plan of Treatment: Low sodium and fat diet, continue anti-hypertensive medications, and follow up with primary care physician.    Diagnosis: CKD (chronic kidney disease)  Assessment and Plan of Treatment: Continue blood pressure, cholesterol and diabetic medications. Goal of hemoglobin A1C (HgbA1C) < 7%.  Avoid nephrotoxic drugs such as nonsteroidal anti-inflammatory agents (NSAIDs).   Please follow up with your nephrologist to monitor your kidney function, continue with low protein and potassium diet.    Diagnosis: Type 2 diabetes mellitus with chronic kidney disease, with long-term current use of insulin, unspecified CKD stage  Assessment and Plan of Treatment: Hypoglycemia management: please check your fingerstick every morning or if you are not feeling well. If your FS is >300mg/dl X3 or more readings please contact your PMD/Endocrinologist. If your FS is low <70mg/dl and/or you have symptoms of very low blood sugar FIRST drink1/2 cup of apple juice (or take 4 glucose tab/tube of glucose gel) and recheck FS in 15mins. Repeat these steps until blood sugar is above 100mg/dl, if NECESSARY. Then call your provider to discuss low blood sugar.   What to expend at followup appointment: please bring a log of your fingerstick and/or your glucometer to you appointment. Your blood sugar tracking will help your diabetes team determine the best plan PRINCIPAL DISCHARGE DIAGNOSIS  Diagnosis: Non-ST elevation MI (NSTEMI)  Assessment and Plan of Treatment: continue hep gtt and aspirin, cath noted triple vessel disease transfer to [facility] for further care CABG vs high risk PCI. holding plavix until further eval      SECONDARY DISCHARGE DIAGNOSES  Diagnosis: Positive D dimer  Assessment and Plan of Treatment: awaiting VQ scan    Diagnosis: Renal lesion  Assessment and Plan of Treatment: incidental finding of right renal hemorrhagic cyst    Diagnosis: Hypercholesterolemia  Assessment and Plan of Treatment: Low fat diet, exercise daily and continue current medications. Follow up with primary care physician and cardiologist for management.    Diagnosis: Hypertension  Assessment and Plan of Treatment: Low sodium and fat diet, continue anti-hypertensive medications, and follow up with primary care physician.    Diagnosis: CKD (chronic kidney disease)  Assessment and Plan of Treatment: Continue blood pressure, cholesterol and diabetic medications. Goal of hemoglobin A1C (HgbA1C) < 7%.  Avoid nephrotoxic drugs such as nonsteroidal anti-inflammatory agents (NSAIDs).   Please follow up with your nephrologist to monitor your kidney function, continue with low protein and potassium diet.    Diagnosis: Type 2 diabetes mellitus with chronic kidney disease, with long-term current use of insulin, unspecified CKD stage  Assessment and Plan of Treatment: Hypoglycemia management: please check your fingerstick every morning or if you are not feeling well. If your FS is >300mg/dl X3 or more readings please contact your PMD/Endocrinologist. If your FS is low <70mg/dl and/or you have symptoms of very low blood sugar FIRST drink1/2 cup of apple juice (or take 4 glucose tab/tube of glucose gel) and recheck FS in 15mins. Repeat these steps until blood sugar is above 100mg/dl, if NECESSARY. Then call your provider to discuss low blood sugar.   What to expend at followup appointment: please bring a log of your fingerstick and/or your glucometer to you appointment. Your blood sugar tracking will help your diabetes team determine the best plan

## 2019-09-24 NOTE — PROGRESS NOTE ADULT - SUBJECTIVE AND OBJECTIVE BOX
[facility]      Seen earlier today, and examined.  - Today, patient is without complaints.           *****MEDICATIONS: Current medication reviewed and documented.    MEDICATIONS  (STANDING):  amLODIPine   Tablet 2.5 milliGRAM(s) Oral daily  aspirin  chewable 81 milliGRAM(s) Oral daily  atorvastatin 80 milliGRAM(s) Oral at bedtime  carbidopa/levodopa  25/100 2 Tablet(s) Oral three times a day  dextrose 5%. 1000 milliLiter(s) (50 mL/Hr) IV Continuous <Continuous>  dextrose 5%. 1000 milliLiter(s) (50 mL/Hr) IV Continuous <Continuous>  dextrose 50% Injectable 12.5 Gram(s) IV Push once  dextrose 50% Injectable 25 Gram(s) IV Push once  dextrose 50% Injectable 25 Gram(s) IV Push once  heparin  Infusion.  Unit(s)/Hr (10 mL/Hr) IV Continuous <Continuous>  hydrochlorothiazide 25 milliGRAM(s) Oral at bedtime  influenza   Vaccine 0.5 milliLiter(s) IntraMuscular once  insulin glargine Injectable (LANTUS) 22 Unit(s) SubCutaneous every morning  insulin lispro (HumaLOG) corrective regimen sliding scale   SubCutaneous at bedtime  insulin lispro (HumaLOG) corrective regimen sliding scale   SubCutaneous three times a day before meals  LORazepam     Tablet 0.5 milliGRAM(s) Oral once  metoprolol tartrate 12.5 milliGRAM(s) Oral two times a day  pantoprazole    Tablet 40 milliGRAM(s) Oral before breakfast  sodium chloride 0.9% lock flush 3 milliLiter(s) IV Push every 8 hours  sodium chloride 0.9%. 500 milliLiter(s) (75 mL/Hr) IV Continuous <Continuous>  trihexyphenidyl 2 milliGRAM(s) Oral three times a day    MEDICATIONS  (PRN):  dextrose 40% Gel 15 Gram(s) Oral once PRN Blood Glucose LESS THAN 70 milliGRAM(s)/deciliter  glucagon  Injectable 1 milliGRAM(s) IntraMuscular once PRN Glucose LESS THAN 70 milligrams/deciliter  heparin  Injectable 6000 Unit(s) IV Push every 6 hours PRN For aPTT less than 40          ***** VITAL SIGNS:  T(F): 97.9 (19 @ 12:18), Max: 98.3 (19 @ 01:30)  HR: 70 (19 @ 12:18) (61 - 70)  BP: 155/77 (19 @ 12:18) (142/77 - 176/89)  RR: 18 (19 @ 12:18) (16 - 18)  SpO2: 99% (19 @ 12:18) (97% - 100%)  Wt(kg): --  ,   I&O's Summary           *****PHYSICAL EXAM: Alert oriented x 2   Attention comprehension are fair. Able to name, repeat,  without any difficulty.   Able to follow 1-2 step commands.     EOMI fundi not visualized,  VFF to confrontration  No facial asymmetry   Tongue is midline   Palate elevates symmetrically   Moving all 4 ext symmetrically no pronator drift  left resting tremor noted   + cogwheeling     Gait : not assessed.  B/L down going toes            *****LAB AND IMAGIN.3   8.78  )-----------( 224      ( 24 Sep 2019 06:35 )             33.5                   135  |  99  |  39<H>  ----------------------------<  145<H>  4.0   |  20<L>  |  2.15<H>    Ca    9.9      24 Sep 2019 06:35  Phos  3.5       Mg     1.8           PTT - ( 24 Sep 2019 06:35 )  PTT:40.7 SEC                   Urinalysis Basic - ( 22 Sep 2019 13:00 )    Color: LIGHT YELLOW / Appearance: CLEAR / S.012 / pH: 6.5  Gluc: NEGATIVE / Ketone: NEGATIVE  / Bili: NEGATIVE / Urobili: NORMAL   Blood: NEGATIVE / Protein: 100 / Nitrite: NEGATIVE   Leuk Esterase: NEGATIVE / RBC: 0-2 / WBC 0-2   Sq Epi: OCC / Non Sq Epi: x / Bacteria: NEGATIVE      [All pertinent recent Imaging/Reports reviewed]           *****A S S E S S M E N T   A N D   P L A N :        70 yo M PMHx CAD s/p stent x 1 (), T2DM, HTN, Parkinson's presenting with c/o 10/10 chest pain described as burning radiating from abdomen to midsternum x 1 day. Patient reports symptoms started at 2 pm and resolved after 1 hour. Chest pain began after he ate lunch and was walking up a flight of stairs. He reports chest pain was associated with dyspnea, diaphoresis, and nausea. At baseline, he ambulates with cane/walker. Takes medications daily including aspirin and prasugrel. On ROS, reports chronic left leg swelling x 1-2 years.        Problem/Recommendations 1: Dizziness of unclear etiology   s/p cardiac cath recommending cabg, awaiting transfer to [facility]      Problem/Recommendations 2: Parkinson's   continue current regimen.  Thank you for allowing me to participate in the care of this patient. Please do not hesitate to call me if you have any  questions.        ________________  Lily Fang MD  [facility] ([facility]  [phone]      30 minutes spent on total encounter; more than 50 % of the visit was  spent counseling about plan of care, compliance to diet/exercise and medication regimen and or  coordinating care by the attending physician.      It is advised that stroke patients follow up with ZACH Albarran @ [phone] in 1- 2 weeks.   Others please follow up with Dr. Michael Nissenbaum [phone]

## 2019-09-24 NOTE — PROGRESS NOTE ADULT - SUBJECTIVE AND OBJECTIVE BOX
Patient seen and examined in bed. No pain, no SOB.      MEDICATIONS  (STANDING):  amLODIPine   Tablet 2.5 milliGRAM(s) Oral daily  aspirin  chewable 81 milliGRAM(s) Oral daily  atorvastatin 80 milliGRAM(s) Oral at bedtime  carbidopa/levodopa  25/100 2 Tablet(s) Oral three times a day  dextrose 5%. 1000 milliLiter(s) (50 mL/Hr) IV Continuous <Continuous>  dextrose 5%. 1000 milliLiter(s) (50 mL/Hr) IV Continuous <Continuous>  dextrose 50% Injectable 12.5 Gram(s) IV Push once  dextrose 50% Injectable 25 Gram(s) IV Push once  dextrose 50% Injectable 25 Gram(s) IV Push once  heparin  Infusion.  Unit(s)/Hr (10 mL/Hr) IV Continuous <Continuous>  hydrochlorothiazide 25 milliGRAM(s) Oral at bedtime  influenza   Vaccine 0.5 milliLiter(s) IntraMuscular once  insulin glargine Injectable (LANTUS) 22 Unit(s) SubCutaneous every morning  insulin lispro (HumaLOG) corrective regimen sliding scale   SubCutaneous at bedtime  insulin lispro (HumaLOG) corrective regimen sliding scale   SubCutaneous three times a day before meals  LORazepam     Tablet 0.5 milliGRAM(s) Oral once  metoprolol tartrate 12.5 milliGRAM(s) Oral two times a day  pantoprazole    Tablet 40 milliGRAM(s) Oral before breakfast  sodium chloride 0.9% lock flush 3 milliLiter(s) IV Push every 8 hours  sodium chloride 0.9%. 500 milliLiter(s) (75 mL/Hr) IV Continuous <Continuous>  trihexyphenidyl 2 milliGRAM(s) Oral three times a day      VITAL:  T(C): , Max: 36.8 (19 @ 01:30)  T(F): , Max: 98.3 (19 @ 01:30)  HR: 61 (19 @ 09:30)  BP: 145/68 (19 @ 09:30)  RR: 16 (19 @ 09:30)  SpO2: 98% (19 @ 09:30)        PHYSICAL EXAM:    Constitutional: NAD  Neck:  No JVD  Respiratory: CTAB/L  Cardiovascular: S1 and S2  Gastrointestinal: BS+, soft, NT/ND  Extremities: No peripheral edema  Neurological: A/O x 3, no focal deficits  Psychiatric: Normal mood, normal affect  : No Javier  Skin: No rashes    LABS:                        11.3   8.78  )-----------( 224      ( 24 Sep 2019 06:35 )             33.5     -    135  |  99  |  39<H>  ----------------------------<  145<H>  4.0   |  20<L>  |  2.15<H>    Ca    9.9      24 Sep 2019 06:35  Phos  3.5       Mg     1.8             Urine Studies:  Urinalysis Basic - ( 22 Sep 2019 13:00 )    Color: LIGHT YELLOW / Appearance: CLEAR / S.012 / pH: 6.5  Gluc: NEGATIVE / Ketone: NEGATIVE  / Bili: NEGATIVE / Urobili: NORMAL   Blood: NEGATIVE / Protein: 100 / Nitrite: NEGATIVE   Leuk Esterase: NEGATIVE / RBC: 0-2 / WBC 0-2   Sq Epi: OCC / Non Sq Epi: x / Bacteria: NEGATIVE      Protein, Random Urine: 116.3 mg/dL ( @ 13:00)  Creatinine, Random Urine: 44.80 mg/dL ( @ 13:00)    RADIOLOGY:    < from: Cardiac Cath Lab - Adult (19 @ 14:21) >  PROCEDURE:  --  Left heart catheterization.  --  Right coronary angiography.  --  Left coronary angiography.  TECHNIQUE: Except as noted, lesion stenosis was estimated visually. The  risks and alternatives of the procedures and conscioussedation were  explained to the patient and informed consent was obtained. Cardiac  catheterization performed electively.  Local anesthetic given. Right radial artery access. Left heart  catheterization. Right coronary artery angiography. The vessel was  injected utilizing a 5 Fr. DxTerity JR 4.0 catheter. Angiography was  performed in multiple projections using hand-injection of contrast. Left  coronary artery angiography. The vessel was injected utilizing a 5 Fr.  DxTerity JL 3.5 catheter. Angiography was performed in multiple  projections using hand-injection of contrast. RADIATION EXPOSURE: 9.1 min.  CONTRAST GIVEN: 29 ml Optiray.  MEDICATIONS GIVEN: Midazolam, 1 mg, IV. Fentanyl, 25 mcg, IV. Verapamil  (Isoptin, Calan, Covera), 2.5 mg, IA. Heparin, 3000 units, IA. 2%  Lidocaine, 3 ml, subcutaneously. 0.9% Normal saline, 70 ml, IV.  VENTRICLES: No LV gram was performed; however, a recent echocardiogram  demonstrated an EF of 45 %.  CORONARY VESSELS: The coronary circulation is right dominant.  LM:   --  LM: Normal.  LAD:   --  Proximal LAD: There was a tubular 70 % stenosis.  --  Mid LAD: Angiography showed minor luminal irregularities with no flow  limiting lesions.  --  Distal LAD: There was a tubular 70 % stenosis.  --  D1: Angiography showed mild atherosclerosis with no flow limiting  lesions.  CX:   --  Proximal circumflex: Angiography showed minor luminal  irregularities with no flow limiting lesions.  --  Mid circumflex: There was a tubular 90 % stenosis.  --  Distal circumflex: Angiography showed minor luminal irregularities with  no flow limiting lesions.  --  OM1: Angiography showed minor luminal irregularities with no flow  limiting lesions.  RCA:   --  Proximal RCA: Angiography showed mild atherosclerosis with no  flow limiting lesions.  --  Mid RCA: Angiography showed minor luminal irregularities with no flow  limiting lesions. There was no significant restenosis in mid RCA stent.  --  Distal RCA: There was a discrete 80 % stenosis.  --  RPDA: There was a 100 % stenosis with the distal vessel being filled  via collaterals. This lesion is a chronic total occlusion.  --  RPLS: Angiography showed minor luminal irregularities with no flow  limiting lesions.  COMPLICATIONS: There were no complications.  DIAGNOSTIC RECOMMENDATIONS: Coronary angiogram demonstrates multivessel  CAD. CT surgery consultation will be obtained for CABG evaluation.    < end of copied text >      ASSESSMENT/PLAN:  68 yo M PMHx CAD s/p stent x 1 (), T2DM, HTN, Parkinson's and CKD stage 3b presents with chest pain.  He has an increase in troponin and will likely get cath this am.  Moderate risk for AUBRIE and the presence of proteinuria will increase that risk  Time of immobilization are common for him    1. Renal - ~3.5g/day proteinuria; Renal sono completed; No evidence of AUBRIE to date, continue to monitor    2. CVS - s/p cardiac cath  with triple vessel disease; Plan for transfer to [facility] for CABG vs. PCI    3. Pulm - LE dopplers negative for DVT, VQ scan pending        CECILIA Queen  [facility]  [phone] Patient seen and examined in bed. No pain, no SOB.      MEDICATIONS  (STANDING):  amLODIPine   Tablet 2.5 milliGRAM(s) Oral daily  aspirin  chewable 81 milliGRAM(s) Oral daily  atorvastatin 80 milliGRAM(s) Oral at bedtime  carbidopa/levodopa  25/100 2 Tablet(s) Oral three times a day  dextrose 5%. 1000 milliLiter(s) (50 mL/Hr) IV Continuous <Continuous>  dextrose 5%. 1000 milliLiter(s) (50 mL/Hr) IV Continuous <Continuous>  dextrose 50% Injectable 12.5 Gram(s) IV Push once  dextrose 50% Injectable 25 Gram(s) IV Push once  dextrose 50% Injectable 25 Gram(s) IV Push once  heparin  Infusion.  Unit(s)/Hr (10 mL/Hr) IV Continuous <Continuous>  hydrochlorothiazide 25 milliGRAM(s) Oral at bedtime  influenza   Vaccine 0.5 milliLiter(s) IntraMuscular once  insulin glargine Injectable (LANTUS) 22 Unit(s) SubCutaneous every morning  insulin lispro (HumaLOG) corrective regimen sliding scale   SubCutaneous at bedtime  insulin lispro (HumaLOG) corrective regimen sliding scale   SubCutaneous three times a day before meals  LORazepam     Tablet 0.5 milliGRAM(s) Oral once  metoprolol tartrate 12.5 milliGRAM(s) Oral two times a day  pantoprazole    Tablet 40 milliGRAM(s) Oral before breakfast  sodium chloride 0.9% lock flush 3 milliLiter(s) IV Push every 8 hours  sodium chloride 0.9%. 500 milliLiter(s) (75 mL/Hr) IV Continuous <Continuous>  trihexyphenidyl 2 milliGRAM(s) Oral three times a day      VITAL:  T(C): , Max: 36.8 (19 @ 01:30)  T(F): , Max: 98.3 (19 @ 01:30)  HR: 61 (19 @ 09:30)  BP: 145/68 (19 @ 09:30)  RR: 16 (19 @ 09:30)  SpO2: 98% (19 @ 09:30)        PHYSICAL EXAM:    Constitutional: NAD  Neck:  No JVD  Respiratory: CTAB/L  Cardiovascular: S1 and S2  Gastrointestinal: BS+, soft, NT/ND  Extremities: No peripheral edema  Neurological: A/O x 3, no focal deficits  Psychiatric: Normal mood, normal affect  : No Javier  Skin: No rashes    LABS:                        11.3   8.78  )-----------( 224      ( 24 Sep 2019 06:35 )             33.5     -    135  |  99  |  39<H>  ----------------------------<  145<H>  4.0   |  20<L>  |  2.15<H>    Ca    9.9      24 Sep 2019 06:35  Phos  3.5       Mg     1.8             Urine Studies:  Urinalysis Basic - ( 22 Sep 2019 13:00 )    Color: LIGHT YELLOW / Appearance: CLEAR / S.012 / pH: 6.5  Gluc: NEGATIVE / Ketone: NEGATIVE  / Bili: NEGATIVE / Urobili: NORMAL   Blood: NEGATIVE / Protein: 100 / Nitrite: NEGATIVE   Leuk Esterase: NEGATIVE / RBC: 0-2 / WBC 0-2   Sq Epi: OCC / Non Sq Epi: x / Bacteria: NEGATIVE      Protein, Random Urine: 116.3 mg/dL ( @ 13:00)  Creatinine, Random Urine: 44.80 mg/dL ( @ 13:00)    RADIOLOGY:    < from: Cardiac Cath Lab - Adult (19 @ 14:21) >  PROCEDURE:  --  Left heart catheterization.  --  Right coronary angiography.  --  Left coronary angiography.  TECHNIQUE: Except as noted, lesion stenosis was estimated visually. The  risks and alternatives of the procedures and conscioussedation were  explained to the patient and informed consent was obtained. Cardiac  catheterization performed electively.  Local anesthetic given. Right radial artery access. Left heart  catheterization. Right coronary artery angiography. The vessel was  injected utilizing a 5 Fr. DxTerity JR 4.0 catheter. Angiography was  performed in multiple projections using hand-injection of contrast. Left  coronary artery angiography. The vessel was injected utilizing a 5 Fr.  DxTerity JL 3.5 catheter. Angiography was performed in multiple  projections using hand-injection of contrast. RADIATION EXPOSURE: 9.1 min.  CONTRAST GIVEN: 29 ml Optiray.  MEDICATIONS GIVEN: Midazolam, 1 mg, IV. Fentanyl, 25 mcg, IV. Verapamil  (Isoptin, Calan, Covera), 2.5 mg, IA. Heparin, 3000 units, IA. 2%  Lidocaine, 3 ml, subcutaneously. 0.9% Normal saline, 70 ml, IV.  VENTRICLES: No LV gram was performed; however, a recent echocardiogram  demonstrated an EF of 45 %.  CORONARY VESSELS: The coronary circulation is right dominant.  LM:   --  LM: Normal.  LAD:   --  Proximal LAD: There was a tubular 70 % stenosis.  --  Mid LAD: Angiography showed minor luminal irregularities with no flow  limiting lesions.  --  Distal LAD: There was a tubular 70 % stenosis.  --  D1: Angiography showed mild atherosclerosis with no flow limiting  lesions.  CX:   --  Proximal circumflex: Angiography showed minor luminal  irregularities with no flow limiting lesions.  --  Mid circumflex: There was a tubular 90 % stenosis.  --  Distal circumflex: Angiography showed minor luminal irregularities with  no flow limiting lesions.  --  OM1: Angiography showed minor luminal irregularities with no flow  limiting lesions.  RCA:   --  Proximal RCA: Angiography showed mild atherosclerosis with no  flow limiting lesions.  --  Mid RCA: Angiography showed minor luminal irregularities with no flow  limiting lesions. There was no significant restenosis in mid RCA stent.  --  Distal RCA: There was a discrete 80 % stenosis.  --  RPDA: There was a 100 % stenosis with the distal vessel being filled  via collaterals. This lesion is a chronic total occlusion.  --  RPLS: Angiography showed minor luminal irregularities with no flow  limiting lesions.  COMPLICATIONS: There were no complications.  DIAGNOSTIC RECOMMENDATIONS: Coronary angiogram demonstrates multivessel  CAD. CT surgery consultation will be obtained for CABG evaluation.    < end of copied text >

## 2019-09-24 NOTE — PROGRESS NOTE ADULT - SUBJECTIVE AND OBJECTIVE BOX
Patient denies chest pain or shortness of breath.   Review of systems otherwise (-)  	  amLODIPine   Tablet 2.5 milliGRAM(s) Oral daily  aspirin  chewable 81 milliGRAM(s) Oral daily  atorvastatin 80 milliGRAM(s) Oral at bedtime  carbidopa/levodopa  25/100 2 Tablet(s) Oral three times a day  dextrose 40% Gel 15 Gram(s) Oral once PRN  dextrose 5%. 1000 milliLiter(s) IV Continuous <Continuous>  dextrose 5%. 1000 milliLiter(s) IV Continuous <Continuous>  dextrose 50% Injectable 12.5 Gram(s) IV Push once  dextrose 50% Injectable 25 Gram(s) IV Push once  dextrose 50% Injectable 25 Gram(s) IV Push once  glucagon  Injectable 1 milliGRAM(s) IntraMuscular once PRN  heparin  Infusion.  Unit(s)/Hr IV Continuous <Continuous>  heparin  Injectable 6000 Unit(s) IV Push every 6 hours PRN  hydrochlorothiazide 25 milliGRAM(s) Oral at bedtime  influenza   Vaccine 0.5 milliLiter(s) IntraMuscular once  insulin glargine Injectable (LANTUS) 22 Unit(s) SubCutaneous every morning  insulin lispro (HumaLOG) corrective regimen sliding scale   SubCutaneous at bedtime  insulin lispro (HumaLOG) corrective regimen sliding scale   SubCutaneous three times a day before meals  metoprolol tartrate 12.5 milliGRAM(s) Oral two times a day  pantoprazole    Tablet 40 milliGRAM(s) Oral before breakfast  sodium chloride 0.9% lock flush 3 milliLiter(s) IV Push every 8 hours  sodium chloride 0.9%. 500 milliLiter(s) IV Continuous <Continuous>  trihexyphenidyl 2 milliGRAM(s) Oral three times a day                            11.3   8.78  )-----------( 224      ( 24 Sep 2019 06:35 )             33.5       09-24    135  |  99  |  39<H>  ----------------------------<  145<H>  4.0   |  20<L>  |  2.15<H>    Ca    9.9      24 Sep 2019 06:35  Phos  3.5     09-24  Mg     1.8     09-24        CARDIAC MARKERS ( 21 Sep 2019 23:30 )  x     / x     / 101 u/L / 4.16 ng/mL / x            T(C): 36.6 (09-24-19 @ 12:18), Max: 36.8 (09-24-19 @ 01:30)  HR: 70 (09-24-19 @ 12:18) (61 - 70)  BP: 155/77 (09-24-19 @ 12:18) (142/77 - 176/89)  RR: 18 (09-24-19 @ 12:18) (16 - 18)  SpO2: 99% (09-24-19 @ 12:18) (97% - 100%)  Wt(kg): --    I&O's Summary      Gen: Appears well in NAD  HEENT:  (-)icterus (-)pallor  CV: N S1 S2 RRR no m/r/g   Resp:  Clear to ausculatation B/L, normal effort  GI: (+) BS Soft, NT, ND  Lymph:  + non-pitting edema bilaterally (-)obvious lymphadenopathy  Skin: Warm to touch, Normal turgor  Psych: Appropriate mood and affect    TELEMETRY: SR    < from: Transthoracic Echocardiogram (09.22.19 @ 08:18) >  CONCLUSIONS:  1. Mitral annular calcification, otherwise normal mitral  valve. Mild mitral regurgitation.  2. Increased relative wall thickness with normal left  ventricular mass index, consistent with concentric left  ventricular remodeling.  3. Mild segmental left ventricular systolic dysfunction.  The anteroseptal and apical walls appear hypokinetic.  4. Mild diastolic dysfunction (Stage I).  5. The right ventricle is not well visualized; grossly  normal right ventricular systolic function.  ------------------------------------------------------------------------  Confirmed on  9/22/2019 - 12:25:06 by Jae Junior MD, [facility],  BRITTNI, VI  ------------------------------------------    < end of copied text >      ASSESSMENT/PLAN: 	  70 yo M PMHx CAD s/p stent x 1 (2010), T2DM, HTN, Parkinson's, history of meningioma, admitted with NSTEMI.     -Cardiac cath performed showing multivessel CAD  -CTS eval with Dr Guillory called  -Follow up CTS  -if pt. is not a surgical candidate, will consider multivessel PCI  -monitor cr, follow up renal  -continue with hep gtt and sapt for now    Apple Feliz MD

## 2019-09-24 NOTE — DISCHARGE NOTE PROVIDER - CARE PROVIDER_API CALL
Apple Feliz (MD)  Cardiovascular Disease; Internal Medicine; Interventional Cardiology  [address]  [place] [address]  Phone: [phone]  Fax: [phone]  Follow Up Time:

## 2019-09-24 NOTE — DISCHARGE NOTE PROVIDER - HOSPITAL COURSE
68 yo M PMHx CAD s/p stent x 1 (2010), T2DM, HTN, Parkinson's presenting with c/o 10/10 chest pain described as burning radiating from abdomen to midsternum x 1 day        + NSTEMI on hep gtt, s/p cath with triple vessel dz     + CKD    + right renal hemorrhagic cyst    + DDimer, LE doppler negative, VQ scan         Hospital course    Chest pain.      EKG: Sinus tachycardia 107bpm 1st degree AVB 1mm VIKI lead II Nim=037     Trop 19 --> 27     CXR - clear     TTE: EF 45 Mild segmental left ventricular systolic dysfunction. The anteroseptal and apical walls appear hypokinetic.    - hx of meningoma, CTH with no acute process cleared by neuro for hep gtt    - LHC: pLAD 70%, OM1 90%, mRCA 80%, RRA accessed    - transfer to [facility] for high risk PCI vs CABG        Elevated D Dimer    - VQ scan ordered     - LE doppler negative for DVT     - presentation points towards cardiac etiology of his chest pain rather then VTE: he has no tachycardia, though on low dose b blockers, and his oxygenation on room air is excellent: His dopplers are negative too: High D Dimer could be due to renal failure        CKD (chronic kidney disease).       -Cr 2.28-->2.43     -will hold losartan, monitor for AUBRIE    -renal US: No hydronephrosis. Complex right lower pole lesion consistent with hemorrhagic cyst        Hypertension.      - bp elevated      - metoprolol 12.5 mg q12 hrs 70 yo M PMHx CAD s/p stent x 1 (2010), T2DM, HTN, Parkinson's presenting with c/o 10/10 chest pain described as burning radiating from abdomen to midsternum x 1 day        + NSTEMI on hep gtt, s/p cath with triple vessel dz     + CKD    + right renal hemorrhagic cyst    + DDimer, LE doppler negative, VQ scan         Hospital course    Chest pain.      EKG: Sinus tachycardia 107bpm 1st degree AVB 1mm VIKI lead II Omt=[id number]     Trop 19 --> 27     CXR - clear     TTE: EF 45 Mild segmental left ventricular systolic dysfunction. The anteroseptal and apical walls appear hypokinetic.    - hx of meningoma, CTH with no acute process cleared by neuro for hep gtt    - LHC: pLAD 70%, OM1 90%, mRCA 80%, RRA accessed    - transfer to [facility] for high risk PCI vs CABG        Elevated D Dimer    - VQ scan ordered     - LE doppler negative for DVT     - presentation points towards cardiac etiology of his chest pain rather then VTE: he has no tachycardia, though on low dose b blockers, and his oxygenation on room air is excellent: His dopplers are negative too: High D Dimer could be due to renal failure        CKD (chronic kidney disease).       -Cr 2.28-->2.43     -will hold losartan, monitor for AUBRIE    -renal US: No hydronephrosis. Complex right lower pole lesion consistent with hemorrhagic cyst        Hypertension.      - bp elevated      - metoprolol 12.5 mg q12 hrs         Patient transfer to [facility] today.

## 2019-09-25 ENCOUNTER — TRANSCRIPTION ENCOUNTER (OUTPATIENT)
Age: 69
End: 2019-09-25

## 2019-09-25 ENCOUNTER — INPATIENT (INPATIENT)
Facility: HOSPITAL | Age: 69
LOS: 19 days | Discharge: HOME | DRG: 236 | End: 2019-10-15
Attending: THORACIC SURGERY (CARDIOTHORACIC VASCULAR SURGERY) | Admitting: THORACIC SURGERY (CARDIOTHORACIC VASCULAR SURGERY)
Payer: MEDICARE

## 2019-09-25 VITALS — SYSTOLIC BLOOD PRESSURE: 148 MMHG | DIASTOLIC BLOOD PRESSURE: 85 MMHG | HEART RATE: 67 BPM

## 2019-09-25 VITALS
HEART RATE: 57 BPM | DIASTOLIC BLOOD PRESSURE: 80 MMHG | HEIGHT: 71 IN | TEMPERATURE: 98 F | RESPIRATION RATE: 18 BRPM | SYSTOLIC BLOOD PRESSURE: 175 MMHG | OXYGEN SATURATION: 96 % | WEIGHT: 239.64 LBS

## 2019-09-25 DIAGNOSIS — Z95.9 PRESENCE OF CARDIAC AND VASCULAR IMPLANT AND GRAFT, UNSPECIFIED: Chronic | ICD-10-CM

## 2019-09-25 DIAGNOSIS — I25.110 ATHEROSCLEROTIC HEART DISEASE OF NATIVE CORONARY ARTERY WITH UNSTABLE ANGINA PECTORIS: ICD-10-CM

## 2019-09-25 LAB
ANION GAP SERPL CALC-SCNC: 15 MMO/L — HIGH (ref 7–14)
APTT BLD: 52.7 SEC — HIGH (ref 27.5–36.3)
APTT BLD: 78.6 SEC — HIGH (ref 27.5–36.3)
BASOPHILS # BLD AUTO: 0.07 K/UL — SIGNIFICANT CHANGE UP (ref 0–0.2)
BASOPHILS # BLD AUTO: 0.2 K/UL — SIGNIFICANT CHANGE UP (ref 0–0.2)
BASOPHILS NFR BLD AUTO: 0.8 % — SIGNIFICANT CHANGE UP (ref 0–2)
BASOPHILS NFR BLD AUTO: 1.5 % — SIGNIFICANT CHANGE UP (ref 0–2)
BUN SERPL-MCNC: 43 MG/DL — HIGH (ref 7–23)
CALCIUM SERPL-MCNC: 9.8 MG/DL — SIGNIFICANT CHANGE UP (ref 8.4–10.5)
CHLORIDE SERPL-SCNC: 99 MMOL/L — SIGNIFICANT CHANGE UP (ref 98–107)
CO2 SERPL-SCNC: 21 MMOL/L — LOW (ref 22–31)
CREAT SERPL-MCNC: 2.44 MG/DL — HIGH (ref 0.5–1.3)
EOSINOPHIL # BLD AUTO: 0.7 K/UL — HIGH (ref 0–0.5)
EOSINOPHIL # BLD AUTO: 0.72 K/UL — HIGH (ref 0–0.5)
EOSINOPHIL NFR BLD AUTO: 7.2 % — HIGH (ref 0–6)
EOSINOPHIL NFR BLD AUTO: 7.9 % — HIGH (ref 0–6)
GLUCOSE BLDC GLUCOMTR-MCNC: 128 MG/DL — HIGH (ref 70–99)
GLUCOSE BLDC GLUCOMTR-MCNC: 131 MG/DL — HIGH (ref 70–99)
GLUCOSE BLDC GLUCOMTR-MCNC: 160 MG/DL — HIGH (ref 70–99)
GLUCOSE SERPL-MCNC: 129 MG/DL — HIGH (ref 70–99)
HCT VFR BLD CALC: 33.9 % — LOW (ref 39–50)
HCT VFR BLD CALC: 33.9 % — LOW (ref 39–50)
HCT VFR BLD CALC: 34.1 % — LOW (ref 39–50)
HGB BLD-MCNC: 11.4 G/DL — LOW (ref 13–17)
HGB BLD-MCNC: 11.4 G/DL — LOW (ref 13–17)
HGB BLD-MCNC: 11.8 G/DL — LOW (ref 13–17)
IMM GRANULOCYTES NFR BLD AUTO: 0.3 % — SIGNIFICANT CHANGE UP (ref 0–1.5)
LYMPHOCYTES # BLD AUTO: 2.8 K/UL — SIGNIFICANT CHANGE UP (ref 1–3.3)
LYMPHOCYTES # BLD AUTO: 3 K/UL — SIGNIFICANT CHANGE UP (ref 1–3.3)
LYMPHOCYTES # BLD AUTO: 30.4 % — SIGNIFICANT CHANGE UP (ref 13–44)
LYMPHOCYTES # BLD AUTO: 30.7 % — SIGNIFICANT CHANGE UP (ref 13–44)
MAGNESIUM SERPL-MCNC: 1.9 MG/DL — SIGNIFICANT CHANGE UP (ref 1.6–2.6)
MCHC RBC-ENTMCNC: 30.8 PG — SIGNIFICANT CHANGE UP (ref 27–34)
MCHC RBC-ENTMCNC: 30.8 PG — SIGNIFICANT CHANGE UP (ref 27–34)
MCHC RBC-ENTMCNC: 32.6 PG — SIGNIFICANT CHANGE UP (ref 27–34)
MCHC RBC-ENTMCNC: 33.6 % — SIGNIFICANT CHANGE UP (ref 32–36)
MCHC RBC-ENTMCNC: 33.6 % — SIGNIFICANT CHANGE UP (ref 32–36)
MCHC RBC-ENTMCNC: 34.7 GM/DL — SIGNIFICANT CHANGE UP (ref 32–36)
MCV RBC AUTO: 91.6 FL — SIGNIFICANT CHANGE UP (ref 80–100)
MCV RBC AUTO: 91.6 FL — SIGNIFICANT CHANGE UP (ref 80–100)
MCV RBC AUTO: 93.9 FL — SIGNIFICANT CHANGE UP (ref 80–100)
MONOCYTES # BLD AUTO: 0.79 K/UL — SIGNIFICANT CHANGE UP (ref 0–0.9)
MONOCYTES # BLD AUTO: 0.9 K/UL — SIGNIFICANT CHANGE UP (ref 0–0.9)
MONOCYTES NFR BLD AUTO: 8.7 % — SIGNIFICANT CHANGE UP (ref 2–14)
MONOCYTES NFR BLD AUTO: 8.8 % — SIGNIFICANT CHANGE UP (ref 2–14)
NEUTROPHILS # BLD AUTO: 4.72 K/UL — SIGNIFICANT CHANGE UP (ref 1.8–7.4)
NEUTROPHILS # BLD AUTO: 5.2 K/UL — SIGNIFICANT CHANGE UP (ref 1.8–7.4)
NEUTROPHILS NFR BLD AUTO: 51.6 % — SIGNIFICANT CHANGE UP (ref 43–77)
NEUTROPHILS NFR BLD AUTO: 52.1 % — SIGNIFICANT CHANGE UP (ref 43–77)
NRBC # FLD: 0 K/UL — SIGNIFICANT CHANGE UP (ref 0–0)
NRBC # FLD: 0 K/UL — SIGNIFICANT CHANGE UP (ref 0–0)
PHOSPHATE SERPL-MCNC: 3.8 MG/DL — SIGNIFICANT CHANGE UP (ref 2.5–4.5)
PLATELET # BLD AUTO: 235 K/UL — SIGNIFICANT CHANGE UP (ref 150–400)
PLATELET # BLD AUTO: 237 K/UL — SIGNIFICANT CHANGE UP (ref 150–400)
PLATELET # BLD AUTO: 237 K/UL — SIGNIFICANT CHANGE UP (ref 150–400)
PMV BLD: 11.3 FL — SIGNIFICANT CHANGE UP (ref 7–13)
PMV BLD: 11.3 FL — SIGNIFICANT CHANGE UP (ref 7–13)
POTASSIUM SERPL-MCNC: 3.9 MMOL/L — SIGNIFICANT CHANGE UP (ref 3.5–5.3)
POTASSIUM SERPL-SCNC: 3.9 MMOL/L — SIGNIFICANT CHANGE UP (ref 3.5–5.3)
RBC # BLD: 3.64 M/UL — LOW (ref 4.2–5.8)
RBC # BLD: 3.7 M/UL — LOW (ref 4.2–5.8)
RBC # BLD: 3.7 M/UL — LOW (ref 4.2–5.8)
RBC # FLD: 12.3 % — SIGNIFICANT CHANGE UP (ref 10.3–14.5)
RBC # FLD: 13.2 % — SIGNIFICANT CHANGE UP (ref 10.3–14.5)
RBC # FLD: 13.2 % — SIGNIFICANT CHANGE UP (ref 10.3–14.5)
SODIUM SERPL-SCNC: 135 MMOL/L — SIGNIFICANT CHANGE UP (ref 135–145)
WBC # BLD: 9.13 K/UL — SIGNIFICANT CHANGE UP (ref 3.8–10.5)
WBC # BLD: 9.13 K/UL — SIGNIFICANT CHANGE UP (ref 3.8–10.5)
WBC # BLD: 9.9 K/UL — SIGNIFICANT CHANGE UP (ref 3.8–10.5)
WBC # FLD AUTO: 9.13 K/UL — SIGNIFICANT CHANGE UP (ref 3.8–10.5)
WBC # FLD AUTO: 9.13 K/UL — SIGNIFICANT CHANGE UP (ref 3.8–10.5)
WBC # FLD AUTO: 9.9 K/UL — SIGNIFICANT CHANGE UP (ref 3.8–10.5)

## 2019-09-25 PROCEDURE — 99223 1ST HOSP IP/OBS HIGH 75: CPT

## 2019-09-25 PROCEDURE — 71045 X-RAY EXAM CHEST 1 VIEW: CPT | Mod: 26

## 2019-09-25 RX ORDER — SODIUM CHLORIDE 9 MG/ML
0 INJECTION INTRAMUSCULAR; INTRAVENOUS; SUBCUTANEOUS
Qty: 0 | Refills: 0 | DISCHARGE
Start: 2019-09-25

## 2019-09-25 RX ORDER — DEXTROSE 50 % IN WATER 50 %
50 SYRINGE (ML) INTRAVENOUS
Qty: 0 | Refills: 0 | DISCHARGE
Start: 2019-09-25

## 2019-09-25 RX ORDER — SODIUM CHLORIDE 9 MG/ML
1000 INJECTION, SOLUTION INTRAVENOUS
Qty: 0 | Refills: 0 | DISCHARGE
Start: 2019-09-25

## 2019-09-25 RX ORDER — INSULIN GLARGINE 100 [IU]/ML
0 INJECTION, SOLUTION SUBCUTANEOUS
Qty: 0 | Refills: 0 | DISCHARGE
Start: 2019-09-25

## 2019-09-25 RX ORDER — ASPIRIN/CALCIUM CARB/MAGNESIUM 324 MG
81 TABLET ORAL DAILY
Refills: 0 | Status: DISCONTINUED | OUTPATIENT
Start: 2019-09-25 | End: 2019-10-02

## 2019-09-25 RX ORDER — GLIMEPIRIDE 1 MG
1 TABLET ORAL
Qty: 0 | Refills: 0 | DISCHARGE

## 2019-09-25 RX ORDER — FUROSEMIDE 40 MG
40 TABLET ORAL DAILY
Refills: 0 | Status: DISCONTINUED | OUTPATIENT
Start: 2019-09-25 | End: 2019-10-02

## 2019-09-25 RX ORDER — TRIHEXYPHENIDYL HCL 2 MG
1 TABLET ORAL
Qty: 0 | Refills: 0 | DISCHARGE
Start: 2019-09-25

## 2019-09-25 RX ORDER — HEPARIN SODIUM 5000 [USP'U]/ML
1000 INJECTION INTRAVENOUS; SUBCUTANEOUS
Qty: 25000 | Refills: 0 | Status: DISCONTINUED | OUTPATIENT
Start: 2019-09-25 | End: 2019-09-26

## 2019-09-25 RX ORDER — METOPROLOL TARTRATE 50 MG
25 TABLET ORAL EVERY 12 HOURS
Refills: 0 | Status: DISCONTINUED | OUTPATIENT
Start: 2019-09-25 | End: 2019-09-25

## 2019-09-25 RX ORDER — CARBIDOPA AND LEVODOPA 25; 100 MG/1; MG/1
2 TABLET ORAL
Qty: 0 | Refills: 0 | DISCHARGE

## 2019-09-25 RX ORDER — DOCUSATE SODIUM 100 MG
100 CAPSULE ORAL THREE TIMES A DAY
Refills: 0 | Status: DISCONTINUED | OUTPATIENT
Start: 2019-09-25 | End: 2019-10-02

## 2019-09-25 RX ORDER — ATORVASTATIN CALCIUM 80 MG/1
80 TABLET, FILM COATED ORAL AT BEDTIME
Refills: 0 | Status: DISCONTINUED | OUTPATIENT
Start: 2019-09-25 | End: 2019-10-02

## 2019-09-25 RX ORDER — CARBIDOPA AND LEVODOPA 25; 100 MG/1; MG/1
2 TABLET ORAL
Qty: 0 | Refills: 0 | DISCHARGE
Start: 2019-09-25

## 2019-09-25 RX ORDER — SODIUM CHLORIDE 9 MG/ML
3 INJECTION INTRAMUSCULAR; INTRAVENOUS; SUBCUTANEOUS EVERY 8 HOURS
Refills: 0 | Status: DISCONTINUED | OUTPATIENT
Start: 2019-09-25 | End: 2019-10-02

## 2019-09-25 RX ORDER — ASPIRIN/CALCIUM CARB/MAGNESIUM 324 MG
1 TABLET ORAL
Qty: 0 | Refills: 0 | DISCHARGE
Start: 2019-09-25

## 2019-09-25 RX ORDER — PANTOPRAZOLE SODIUM 20 MG/1
40 TABLET, DELAYED RELEASE ORAL
Refills: 0 | Status: DISCONTINUED | OUTPATIENT
Start: 2019-09-25 | End: 2019-10-02

## 2019-09-25 RX ORDER — METOPROLOL TARTRATE 50 MG
0 TABLET ORAL
Qty: 0 | Refills: 0 | DISCHARGE
Start: 2019-09-25

## 2019-09-25 RX ORDER — LOSARTAN/HYDROCHLOROTHIAZIDE 100MG-25MG
1 TABLET ORAL
Qty: 0 | Refills: 0 | DISCHARGE

## 2019-09-25 RX ORDER — SPIRONOLACTONE 25 MG/1
25 TABLET, FILM COATED ORAL DAILY
Refills: 0 | Status: DISCONTINUED | OUTPATIENT
Start: 2019-09-25 | End: 2019-09-26

## 2019-09-25 RX ORDER — PRASUGREL 5 MG/1
1 TABLET, FILM COATED ORAL
Qty: 0 | Refills: 0 | DISCHARGE

## 2019-09-25 RX ORDER — HEPARIN SODIUM 5000 [USP'U]/ML
0 INJECTION INTRAVENOUS; SUBCUTANEOUS
Qty: 0 | Refills: 0 | DISCHARGE
Start: 2019-09-25

## 2019-09-25 RX ORDER — DEXTROSE 50 % IN WATER 50 %
25 SYRINGE (ML) INTRAVENOUS
Qty: 0 | Refills: 0 | DISCHARGE
Start: 2019-09-25

## 2019-09-25 RX ORDER — DEXTROSE 50 % IN WATER 50 %
0 SYRINGE (ML) INTRAVENOUS
Qty: 0 | Refills: 0 | DISCHARGE
Start: 2019-09-25

## 2019-09-25 RX ORDER — ENOXAPARIN SODIUM 100 MG/ML
30 INJECTION SUBCUTANEOUS EVERY 12 HOURS
Refills: 0 | Status: DISCONTINUED | OUTPATIENT
Start: 2019-09-25 | End: 2019-09-25

## 2019-09-25 RX ORDER — AMLODIPINE BESYLATE 2.5 MG/1
1 TABLET ORAL
Qty: 0 | Refills: 0 | DISCHARGE
Start: 2019-09-25

## 2019-09-25 RX ORDER — METOPROLOL TARTRATE 50 MG
12.5 TABLET ORAL EVERY 12 HOURS
Refills: 0 | Status: DISCONTINUED | OUTPATIENT
Start: 2019-09-25 | End: 2019-09-28

## 2019-09-25 RX ORDER — TRIHEXYPHENIDYL HCL 2 MG
1 TABLET ORAL
Qty: 0 | Refills: 0 | DISCHARGE

## 2019-09-25 RX ORDER — PANTOPRAZOLE SODIUM 20 MG/1
1 TABLET, DELAYED RELEASE ORAL
Qty: 0 | Refills: 0 | DISCHARGE
Start: 2019-09-25

## 2019-09-25 RX ORDER — TRIHEXYPHENIDYL HCL 2 MG
2 TABLET ORAL THREE TIMES A DAY
Refills: 0 | Status: DISCONTINUED | OUTPATIENT
Start: 2019-09-25 | End: 2019-10-02

## 2019-09-25 RX ORDER — CARBIDOPA AND LEVODOPA 25; 100 MG/1; MG/1
2 TABLET ORAL THREE TIMES A DAY
Refills: 0 | Status: DISCONTINUED | OUTPATIENT
Start: 2019-09-25 | End: 2019-10-02

## 2019-09-25 RX ORDER — ATORVASTATIN CALCIUM 80 MG/1
1 TABLET, FILM COATED ORAL
Qty: 0 | Refills: 0 | DISCHARGE
Start: 2019-09-25

## 2019-09-25 RX ADMIN — ATORVASTATIN CALCIUM 80 MILLIGRAM(S): 80 TABLET, FILM COATED ORAL at 23:54

## 2019-09-25 RX ADMIN — HEPARIN SODIUM 1100 UNIT(S)/HR: 5000 INJECTION INTRAVENOUS; SUBCUTANEOUS at 15:01

## 2019-09-25 RX ADMIN — CARBIDOPA AND LEVODOPA 2 TABLET(S): 25; 100 TABLET ORAL at 06:26

## 2019-09-25 RX ADMIN — INSULIN GLARGINE 22 UNIT(S): 100 INJECTION, SOLUTION SUBCUTANEOUS at 08:10

## 2019-09-25 RX ADMIN — SODIUM CHLORIDE 3 MILLILITER(S): 9 INJECTION INTRAMUSCULAR; INTRAVENOUS; SUBCUTANEOUS at 14:59

## 2019-09-25 RX ADMIN — AMLODIPINE BESYLATE 2.5 MILLIGRAM(S): 2.5 TABLET ORAL at 06:25

## 2019-09-25 RX ADMIN — Medication 12.5 MILLIGRAM(S): at 17:42

## 2019-09-25 RX ADMIN — HEPARIN SODIUM 1100 UNIT(S)/HR: 5000 INJECTION INTRAVENOUS; SUBCUTANEOUS at 08:09

## 2019-09-25 RX ADMIN — SODIUM CHLORIDE 3 MILLILITER(S): 9 INJECTION INTRAMUSCULAR; INTRAVENOUS; SUBCUTANEOUS at 06:12

## 2019-09-25 RX ADMIN — Medication 12.5 MILLIGRAM(S): at 06:25

## 2019-09-25 RX ADMIN — Medication 2 MILLIGRAM(S): at 06:26

## 2019-09-25 RX ADMIN — Medication 2 MILLIGRAM(S): at 15:01

## 2019-09-25 RX ADMIN — PANTOPRAZOLE SODIUM 40 MILLIGRAM(S): 20 TABLET, DELAYED RELEASE ORAL at 06:28

## 2019-09-25 RX ADMIN — HEPARIN SODIUM 11 UNIT(S)/HR: 5000 INJECTION INTRAVENOUS; SUBCUTANEOUS at 23:59

## 2019-09-25 RX ADMIN — CARBIDOPA AND LEVODOPA 2 TABLET(S): 25; 100 TABLET ORAL at 15:01

## 2019-09-25 RX ADMIN — Medication 1: at 12:41

## 2019-09-25 RX ADMIN — CARBIDOPA AND LEVODOPA 2 TABLET(S): 25; 100 TABLET ORAL at 23:51

## 2019-09-25 RX ADMIN — Medication 81 MILLIGRAM(S): at 15:01

## 2019-09-25 RX ADMIN — Medication 2 MILLIGRAM(S): at 23:51

## 2019-09-25 NOTE — H&P ADULT - PROBLEM SELECTOR PLAN 1
Continue heparin gtt  p2y12 level for Effient  carotid doppler/cxr, tte -completed  cbc, cmp, inr, ptt  Pending OR date w/ Dr. Guillory

## 2019-09-25 NOTE — PATIENT PROFILE ADULT - IS THERE A SUSPICION OF ABUSE/NEGLIGENCE?
Quality 130: Documentation Of Current Medications In The Medical Record: Current Medications Documented Detail Level: Detailed Quality 226: Preventive Care And Screening: Tobacco Use: Screening And Cessation Intervention: Patient screened for tobacco and never smoked Quality 110: Preventive Care And Screening: Influenza Immunization: Influenza Immunization not Administered because Patient Refused. no

## 2019-09-25 NOTE — PROGRESS NOTE ADULT - PROBLEM SELECTOR PLAN 1
Now the etiology of chest pain and sob HAS BEEN DETERMINED: hAS tvd: I DON'T  THINK HE NEEDS VQ SCAN NOW: hIS DOPPLERS ARE NEGATIVE ANY WAY AND HIS sob HAS RESOLVED:  9/25: DOING GOOD: AWAITING TO BE TRANSFEREDD TO [facility] FOR CABG: NO CHANGE IN HIS RESPIRATION AS WELLAS OXYGENATION
Now the etiology of chest pain and sob HAS BEEN DETERMINED: hAS tvd: I DON'T  THINK HE NEEDS VQ SCAN NOW: hIS DOPPLERS ARE NEGATIVE ANY WAY AND HIS sob HAS RESOLVED:

## 2019-09-25 NOTE — H&P ADULT - NSICDXPASTMEDICALHX_GEN_ALL_CORE_FT
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

## 2019-09-25 NOTE — H&P ADULT - ASSESSMENT
70 yo M PMHx CAD s/p stent x 1 (2010), T2DM, HTN, Parkinson's presenting with c/o 10/10 chest pain described as burning radiating from abdomen to midsternum x 1 day. Patient reports symptoms started at 2 pm and resolved after 1 hour. Chest pain began after he ate lunch and was walking up a flight of stairs. He reports chest pain was associated with dyspnea, diaphoresis, and nausea. At baseline, he ambulates with cane/walker. Takes medications daily including aspirin and prasugrel. On ROS, reports chronic left leg swelling x 1-2 years. Pt s/p cardiac cath at [facility] found to have multivessel disease requiring transfer to [facility] CT surgery service. Pt stable at this time. Pre-op work up initiated

## 2019-09-25 NOTE — DISCHARGE NOTE NURSING/CASE MANAGEMENT/SOCIAL WORK - PATIENT PORTAL LINK FT
You can access the FollowMyHealth Patient Portal offered by [facility] by registering at the following website: [email or web address]. By joining [employer]’s FollowMyHealth portal, you will also be able to view your health information using other applications (apps) compatible with our system.

## 2019-09-25 NOTE — PROGRESS NOTE ADULT - SUBJECTIVE AND OBJECTIVE BOX
Patient is a 69y old  Male who presents with a chief complaint of chest pain (25 Sep 2019 13:15)      INTERVAL HPI/OVERNIGHT EVENTS:  T(C): 36.9 (09-25-19 @ 12:30), Max: 36.9 (09-25-19 @ 12:30)  HR: 67 (09-25-19 @ 17:41) (61 - 67)  BP: 148/85 (09-25-19 @ 17:41) (126/60 - 148/85)  RR: 18 (09-25-19 @ 12:30) (16 - 18)  SpO2: 99% (09-25-19 @ 12:30) (99% - 100%)  Wt(kg): --  I&O's Summary      LABS:                        11.4   9.13  )-----------( 237      ( 25 Sep 2019 06:50 )             33.9     09-25    135  |  99  |  43<H>  ----------------------------<  129<H>  3.9   |  21<L>  |  2.44<H>    Ca    9.8      25 Sep 2019 06:50  Phos  3.8     09-25  Mg     1.9     09-25      PTT - ( 25 Sep 2019 13:55 )  PTT:52.7 SEC    CAPILLARY BLOOD GLUCOSE      POCT Blood Glucose.: 128 mg/dL (25 Sep 2019 17:12)  POCT Blood Glucose.: 160 mg/dL (25 Sep 2019 12:18)  POCT Blood Glucose.: 131 mg/dL (25 Sep 2019 07:47)  POCT Blood Glucose.: 143 mg/dL (24 Sep 2019 21:54)            MEDICATIONS  (STANDING):  amLODIPine   Tablet 2.5 milliGRAM(s) Oral daily  aspirin  chewable 81 milliGRAM(s) Oral daily  atorvastatin 80 milliGRAM(s) Oral at bedtime  carbidopa/levodopa  25/100 2 Tablet(s) Oral three times a day  dextrose 5%. 1000 milliLiter(s) (50 mL/Hr) IV Continuous <Continuous>  dextrose 5%. 1000 milliLiter(s) (50 mL/Hr) IV Continuous <Continuous>  dextrose 50% Injectable 12.5 Gram(s) IV Push once  dextrose 50% Injectable 25 Gram(s) IV Push once  dextrose 50% Injectable 25 Gram(s) IV Push once  heparin  Infusion.  Unit(s)/Hr (10 mL/Hr) IV Continuous <Continuous>  hydrochlorothiazide 25 milliGRAM(s) Oral at bedtime  influenza   Vaccine 0.5 milliLiter(s) IntraMuscular once  insulin glargine Injectable (LANTUS) 22 Unit(s) SubCutaneous every morning  insulin lispro (HumaLOG) corrective regimen sliding scale   SubCutaneous at bedtime  insulin lispro (HumaLOG) corrective regimen sliding scale   SubCutaneous three times a day before meals  metoprolol tartrate 12.5 milliGRAM(s) Oral two times a day  pantoprazole    Tablet 40 milliGRAM(s) Oral before breakfast  sodium chloride 0.9% lock flush 3 milliLiter(s) IV Push every 8 hours  sodium chloride 0.9%. 500 milliLiter(s) (75 mL/Hr) IV Continuous <Continuous>  trihexyphenidyl 2 milliGRAM(s) Oral three times a day    MEDICATIONS  (PRN):  dextrose 40% Gel 15 Gram(s) Oral once PRN Blood Glucose LESS THAN 70 milliGRAM(s)/deciliter  glucagon  Injectable 1 milliGRAM(s) IntraMuscular once PRN Glucose LESS THAN 70 milligrams/deciliter  heparin  Injectable 6000 Unit(s) IV Push every 6 hours PRN For aPTT less than 40          PHYSICAL EXAM:  GENERAL: NAD, well-groomed, well-developed  HEAD:  Atraumatic, Normocephalic  CHEST/LUNG: Clear to percussion bilaterally; No rales, rhonchi, wheezing, or rubs  HEART: Regular rate and rhythm; No murmurs, rubs, or gallops  ABDOMEN: Soft, Nontender, Nondistended; Bowel sounds present  EXTREMITIES:  2+ Peripheral Pulses, No clubbing, cyanosis, or edema  LYMPH: No lymphadenopathy noted  SKIN: No rashes or lesions    Care Discussed with Consultants/Other Providers [ ] YES  [ ] NO

## 2019-09-25 NOTE — PROGRESS NOTE ADULT - PROBLEM SELECTOR PROBLEM 4
Type 2 diabetes mellitus with chronic kidney disease, with long-term current use of insulin, unspecified CKD stage
Type 2 diabetes mellitus with chronic kidney disease, with long-term current use of insulin, unspecified CKD stage

## 2019-09-25 NOTE — H&P ADULT - NSHPPHYSICALEXAM_GEN_ALL_CORE
Telemetry:  SB 54  Vital Signs Last 24 Hrs  T(C): 36.5 (09-25-19 @ 21:00), Max: 36.9 (09-25-19 @ 12:30)  T(F): 97.7 (09-25-19 @ 21:00), Max: 98.4 (09-25-19 @ 12:30)  HR: 57 (09-25-19 @ 23:16) (57 - 67)  BP: 147/74 (09-25-19 @ 23:16) (134/68 - 175/80)  RR: 18 (09-25-19 @ 23:16) (18 - 18)  SpO2: 96% (09-25-19 @ 23:16) (96% - 99%)           PHYSICAL EXAM  General: WN, WD, NAD  Neurology: alert and oriented x 3, nonfocal, no gross deficits  CV : s1, s2  Lungs: CTA B/L  Abdomen: soft, NT,ND, ( +)Bowel sounds  :  voiding  Extremities:  1+ edema of LE B/L. Neg calve tenderness b/l. PP 2+ b/l

## 2019-09-25 NOTE — H&P ADULT - HISTORY OF PRESENT ILLNESS
70 yo M PMHx CAD s/p stent x 1 (2010), T2DM, HTN, Parkinson's presenting with c/o 10/10 chest pain described as burning radiating from abdomen to midsternum x 1 day. Patient reports symptoms started at 2 pm and resolved after 1 hour. Chest pain began after he ate lunch and was walking up a flight of stairs. He reports chest pain was associated with dyspnea, diaphoresis, and nausea. At baseline, he ambulates with cane/walker. Takes medications daily including aspirin and prasugrel. On ROS, reports chronic left leg swelling x 1-2 years. Pt s/p cardiac cath at [facility] found to have multivessel disease requiring transfer to [facility] CT surgery service. Pt denies any chest pain, palpitations, SOB, N/V/D, fever, syncope, changes in vision, numbness/tingling or recent travel.

## 2019-09-25 NOTE — PROGRESS NOTE ADULT - REASON FOR ADMISSION
chest pain

## 2019-09-25 NOTE — PROGRESS NOTE ADULT - SUBJECTIVE AND OBJECTIVE BOX
Patient seen and examined in bed. No pain, no SOB.      MEDICATIONS  (STANDING):  amLODIPine   Tablet 2.5 milliGRAM(s) Oral daily  aspirin  chewable 81 milliGRAM(s) Oral daily  atorvastatin 80 milliGRAM(s) Oral at bedtime  carbidopa/levodopa  25/100 2 Tablet(s) Oral three times a day  dextrose 5%. 1000 milliLiter(s) (50 mL/Hr) IV Continuous <Continuous>  dextrose 5%. 1000 milliLiter(s) (50 mL/Hr) IV Continuous <Continuous>  dextrose 50% Injectable 12.5 Gram(s) IV Push once  dextrose 50% Injectable 25 Gram(s) IV Push once  dextrose 50% Injectable 25 Gram(s) IV Push once  heparin  Infusion.  Unit(s)/Hr (10 mL/Hr) IV Continuous <Continuous>  hydrochlorothiazide 25 milliGRAM(s) Oral at bedtime  influenza   Vaccine 0.5 milliLiter(s) IntraMuscular once  insulin glargine Injectable (LANTUS) 22 Unit(s) SubCutaneous every morning  insulin lispro (HumaLOG) corrective regimen sliding scale   SubCutaneous at bedtime  insulin lispro (HumaLOG) corrective regimen sliding scale   SubCutaneous three times a day before meals  metoprolol tartrate 12.5 milliGRAM(s) Oral two times a day  pantoprazole    Tablet 40 milliGRAM(s) Oral before breakfast  sodium chloride 0.9% lock flush 3 milliLiter(s) IV Push every 8 hours  sodium chloride 0.9%. 500 milliLiter(s) (75 mL/Hr) IV Continuous <Continuous>  trihexyphenidyl 2 milliGRAM(s) Oral three times a day      VITAL:  T(C): , Max: 36.8 (09-25-19 @ 06:23)  T(F): , Max: 98.3 (09-25-19 @ 06:23)  HR: 61 (09-25-19 @ 06:23)  BP: 134/68 (09-25-19 @ 06:23)  RR: 18 (09-25-19 @ 06:23)  SpO2: 100% (09-24-19 @ 21:40)          PHYSICAL EXAM:    Constitutional: NAD  Neck:  No JVD  Respiratory: CTAB/L  Cardiovascular: S1 and S2  Gastrointestinal: BS+, soft, NT/ND  Extremities: No peripheral edema  Neurological: A/O x 3, no focal deficits  Psychiatric: Normal mood, normal affect  : No Javier  Skin: No rashes    LABS:                        11.4   9.13  )-----------( 237      ( 25 Sep 2019 06:50 )             33.9     09-25    135  |  99  |  43<H>  ----------------------------<  129<H>  3.9   |  21<L>  |  2.44<H>    Ca    9.8      25 Sep 2019 06:50  Phos  3.8     09-25  Mg     1.9     09-25        ASSESSMENT/PLAN:  68 yo M PMHx CAD s/p stent x 1 (2010), T2DM, HTN, Parkinson's and CKD stage 3b presents with chest pain.  Moderate risk for AUBRIE and the presence of proteinuria will increase that risk  Time of immobilization are common for him    1. Renal - JLJ1a-3, ~3.5g/day proteinuria; Renal sono completed; Creatinine higher this AM likely AUBRIE, trend BMP     2. CVS - s/p cardiac cath 9/23 with triple vessel disease; Plan for transfer to [facility] for CABG vs. PCI, await CTS decision    3. Pulm - LE dopplers negative for DVT, VQ scan pending        SILVIA QueenC  [facility]  [phone] Patient seen and examined in bed. No pain, no SOB.      MEDICATIONS  (STANDING):  amLODIPine   Tablet 2.5 milliGRAM(s) Oral daily  aspirin  chewable 81 milliGRAM(s) Oral daily  atorvastatin 80 milliGRAM(s) Oral at bedtime  carbidopa/levodopa  25/100 2 Tablet(s) Oral three times a day  dextrose 5%. 1000 milliLiter(s) (50 mL/Hr) IV Continuous <Continuous>  dextrose 5%. 1000 milliLiter(s) (50 mL/Hr) IV Continuous <Continuous>  dextrose 50% Injectable 12.5 Gram(s) IV Push once  dextrose 50% Injectable 25 Gram(s) IV Push once  dextrose 50% Injectable 25 Gram(s) IV Push once  heparin  Infusion.  Unit(s)/Hr (10 mL/Hr) IV Continuous <Continuous>  hydrochlorothiazide 25 milliGRAM(s) Oral at bedtime  influenza   Vaccine 0.5 milliLiter(s) IntraMuscular once  insulin glargine Injectable (LANTUS) 22 Unit(s) SubCutaneous every morning  insulin lispro (HumaLOG) corrective regimen sliding scale   SubCutaneous at bedtime  insulin lispro (HumaLOG) corrective regimen sliding scale   SubCutaneous three times a day before meals  metoprolol tartrate 12.5 milliGRAM(s) Oral two times a day  pantoprazole    Tablet 40 milliGRAM(s) Oral before breakfast  sodium chloride 0.9% lock flush 3 milliLiter(s) IV Push every 8 hours  sodium chloride 0.9%. 500 milliLiter(s) (75 mL/Hr) IV Continuous <Continuous>  trihexyphenidyl 2 milliGRAM(s) Oral three times a day      VITAL:  T(C): , Max: 36.8 (09-25-19 @ 06:23)  T(F): , Max: 98.3 (09-25-19 @ 06:23)  HR: 61 (09-25-19 @ 06:23)  BP: 134/68 (09-25-19 @ 06:23)  RR: 18 (09-25-19 @ 06:23)  SpO2: 100% (09-24-19 @ 21:40)          PHYSICAL EXAM:    Constitutional: NAD  Neck:  No JVD  Respiratory: CTAB/L  Cardiovascular: S1 and S2  Gastrointestinal: BS+, soft, NT/ND  Extremities: No peripheral edema  Neurological: A/O x 3, no focal deficits  Psychiatric: Normal mood, normal affect  : No Javier  Skin: No rashes    LABS:                        11.4   9.13  )-----------( 237      ( 25 Sep 2019 06:50 )             33.9     09-25    135  |  99  |  43<H>  ----------------------------<  129<H>  3.9   |  21<L>  |  2.44<H>    Ca    9.8      25 Sep 2019 06:50  Phos  3.8     09-25  Mg     1.9     09-25        ASSESSMENT/PLAN:  70 yo M PMHx CAD s/p stent x 1 (2010), T2DM, HTN, Parkinson's and CKD stage 3b presents with chest pain.  Moderate risk for AUBRIE and the presence of proteinuria will increase that risk  Time of immobilization are common for him    1. Renal - TDL4r-6, ~3.5g/day proteinuria; Renal sono completed; Renal function overall stable, trend BMP     2. CVS - s/p cardiac cath 9/23 with triple vessel disease; Plan [facility]G vs. PCI, await CTS decision    3. Pulm - LE dopplers negative for DVT, VQ scan pending        CECILIA Queen  [facility]  [phone] Patient seen and examined in bed. No pain, no SOB.  He was in good spirits       MEDICATIONS  (STANDING):  amLODIPine   Tablet 2.5 milliGRAM(s) Oral daily  aspirin  chewable 81 milliGRAM(s) Oral daily  atorvastatin 80 milliGRAM(s) Oral at bedtime  carbidopa/levodopa  25/100 2 Tablet(s) Oral three times a day  dextrose 5%. 1000 milliLiter(s) (50 mL/Hr) IV Continuous <Continuous>  dextrose 5%. 1000 milliLiter(s) (50 mL/Hr) IV Continuous <Continuous>  dextrose 50% Injectable 12.5 Gram(s) IV Push once  dextrose 50% Injectable 25 Gram(s) IV Push once  dextrose 50% Injectable 25 Gram(s) IV Push once  heparin  Infusion.  Unit(s)/Hr (10 mL/Hr) IV Continuous <Continuous>  hydrochlorothiazide 25 milliGRAM(s) Oral at bedtime  influenza   Vaccine 0.5 milliLiter(s) IntraMuscular once  insulin glargine Injectable (LANTUS) 22 Unit(s) SubCutaneous every morning  insulin lispro (HumaLOG) corrective regimen sliding scale   SubCutaneous at bedtime  insulin lispro (HumaLOG) corrective regimen sliding scale   SubCutaneous three times a day before meals  metoprolol tartrate 12.5 milliGRAM(s) Oral two times a day  pantoprazole    Tablet 40 milliGRAM(s) Oral before breakfast  sodium chloride 0.9% lock flush 3 milliLiter(s) IV Push every 8 hours  sodium chloride 0.9%. 500 milliLiter(s) (75 mL/Hr) IV Continuous <Continuous>  trihexyphenidyl 2 milliGRAM(s) Oral three times a day      VITAL:  T(C): , Max: 36.8 (09-25-19 @ 06:23)  T(F): , Max: 98.3 (09-25-19 @ 06:23)  HR: 61 (09-25-19 @ 06:23)  BP: 134/68 (09-25-19 @ 06:23)  RR: 18 (09-25-19 @ 06:23)  SpO2: 100% (09-24-19 @ 21:40)          PHYSICAL EXAM:    Constitutional: NAD  Neck:  No JVD  Respiratory: CTAB/L  Cardiovascular: S1 and S2  Gastrointestinal: BS+, soft, NT/ND  Extremities: No peripheral edema  Neurological: A/O x 3, no focal deficits  Psychiatric: Normal mood, normal affect  : No Javier  Skin: No rashes    LABS:                        11.4   9.13  )-----------( 237      ( 25 Sep 2019 06:50 )             33.9     09-25    135  |  99  |  43<H>  ----------------------------<  129<H>  3.9   |  21<L>  |  2.44<H>    Ca    9.8      25 Sep 2019 06:50  Phos  3.8     09-25  Mg     1.9     09-25

## 2019-09-25 NOTE — PROGRESS NOTE ADULT - ATTENDING COMMENTS
Patient seen and examined.  Agree with above.   -pt. s/p cardiac cath showing triple vessel disease  -spoke with CTS - plan to transfer to [facility] for CABG    Apple Feliz MD
Patient seen and examined.  Agree with above.   Admitted with NSTEMI  D-dimer elevated; LE duplex negative for PE; spoke with pulmonary - low suspicion for PE and cleared for cath today  Spoke with renal, cleared for cath today  Cath today to further evaluate NSTEMI    Apple Feliz MD
Renal Attd     -Awaiting cts consult for plan  -Good news is that creatinine is improvin
Renal attd    Triple vessel disease n cards will dw CTS    -Cont IVF as outlined    35 minutes spent on total encounter and more then 50% of the visit was spent counseling and/or coordinating care by the attending physician
Renal attd    Pt is being xfered to [facility] for CABG  2 grams of proteinuria at present    35 minutes spent on total encounter and more then 50% of the visit was spent counseling and/or coordinating care by the attending physician
no need for vq scan now: room air oxygenation excellent:
no need for vq scan now: room air oxygenation excellent:  9/25: RESP WISE HE SEEMS TO BE DOING BETTER

## 2019-09-25 NOTE — PROGRESS NOTE ADULT - SUBJECTIVE AND OBJECTIVE BOX
Patient denies chest pain or shortness of breath.   Review of systems otherwise (-)  	    MEDICATIONS  (STANDING):  amLODIPine   Tablet 2.5 milliGRAM(s) Oral daily  aspirin  chewable 81 milliGRAM(s) Oral daily  atorvastatin 80 milliGRAM(s) Oral at bedtime  carbidopa/levodopa  25/100 2 Tablet(s) Oral three times a day  dextrose 5%. 1000 milliLiter(s) (50 mL/Hr) IV Continuous <Continuous>  dextrose 5%. 1000 milliLiter(s) (50 mL/Hr) IV Continuous <Continuous>  dextrose 50% Injectable 12.5 Gram(s) IV Push once  dextrose 50% Injectable 25 Gram(s) IV Push once  dextrose 50% Injectable 25 Gram(s) IV Push once  heparin  Infusion.  Unit(s)/Hr (10 mL/Hr) IV Continuous <Continuous>  hydrochlorothiazide 25 milliGRAM(s) Oral at bedtime  influenza   Vaccine 0.5 milliLiter(s) IntraMuscular once  insulin glargine Injectable (LANTUS) 22 Unit(s) SubCutaneous every morning  insulin lispro (HumaLOG) corrective regimen sliding scale   SubCutaneous at bedtime  insulin lispro (HumaLOG) corrective regimen sliding scale   SubCutaneous three times a day before meals  metoprolol tartrate 12.5 milliGRAM(s) Oral two times a day  pantoprazole    Tablet 40 milliGRAM(s) Oral before breakfast  sodium chloride 0.9% lock flush 3 milliLiter(s) IV Push every 8 hours  sodium chloride 0.9%. 500 milliLiter(s) (75 mL/Hr) IV Continuous <Continuous>  trihexyphenidyl 2 milliGRAM(s) Oral three times a day    MEDICATIONS  (PRN):  dextrose 40% Gel 15 Gram(s) Oral once PRN Blood Glucose LESS THAN 70 milliGRAM(s)/deciliter  glucagon  Injectable 1 milliGRAM(s) IntraMuscular once PRN Glucose LESS THAN 70 milligrams/deciliter  heparin  Injectable 6000 Unit(s) IV Push every 6 hours PRN For aPTT less than 40      LABS:                            11.4   9.13  )-----------( 237      ( 25 Sep 2019 06:50 )             33.9     135  |  99  |  43<H>  ----------------------------<  129<H>  3.9   |  21<L>  |  2.44<H>    Ca    9.8      25 Sep 2019 06:50  Phos  3.8     09-25  Mg     1.9     09-25    Creatinine Trend: 2.44<--, 2.15<--, 2.33<--, 2.14<--, 2.28<--   PTT - ( 25 Sep 2019 06:50 )  PTT:78.6 SEC      PHYSICAL EXAM  Vital Signs Last 24 Hrs  T(C): 36.9 (25 Sep 2019 12:30), Max: 36.9 (25 Sep 2019 12:30)  T(F): 98.4 (25 Sep 2019 12:30), Max: 98.4 (25 Sep 2019 12:30)  HR: 63 (25 Sep 2019 12:30) (61 - 73)  BP: 143/79 (25 Sep 2019 12:30) (126/60 - 159/67)  RR: 18 (25 Sep 2019 12:30) (16 - 18)  SpO2: 99% (25 Sep 2019 12:30) (99% - 100%)    Gen: Appears well in NAD  HEENT:  (-)icterus (-)pallor  CV: N S1 S2 RRR no m/r/g   Resp:  Clear to ausculatation B/L, normal effort  GI: (+) BS Soft, NT, ND  Lymph:  + non-pitting edema bilaterally (-)obvious lymphadenopathy  Skin: Warm to touch, Normal turgor  Psych: Appropriate mood and affect    TELEMETRY: SR    < from: Transthoracic Echocardiogram (09.22.19 @ 08:18) >  CONCLUSIONS:  1. Mitral annular calcification, otherwise normal mitral  valve. Mild mitral regurgitation.  2. Increased relative wall thickness with normal left  ventricular mass index, consistent with concentric left  ventricular remodeling.  3. Mild segmental left ventricular systolic dysfunction.  The anteroseptal and apical walls appear hypokinetic.  4. Mild diastolic dysfunction (Stage I).  5. The right ventricle is not well visualized; grossly  normal right ventricular systolic function.  ------------------------------------------------------------------------  Confirmed on  9/22/2019 - 12:25:06 by Jae Junior MD, [facility],  [facility], [facility]  ------------------------------------------    < end of copied text >      ASSESSMENT/PLAN: 	  68 yo M PMHx CAD s/p stent x 1 (2010), T2DM, HTN, Parkinson's, history of meningioma, admitted with NSTEMI.     -Cardiac cath performed showing multivessel CAD  -cont current meds as listed  -CTS eval with Dr Guillory called  -trx to NS MAN pending

## 2019-09-25 NOTE — PROGRESS NOTE ADULT - SUBJECTIVE AND OBJECTIVE BOX
Patient is a 69y old  Male who presents with a chief complaint of chest pain (25 Sep 2019 10:21)      Any change in ROS: Breathing is OK : not OSB     MEDICATIONS  (STANDING):  amLODIPine   Tablet 2.5 milliGRAM(s) Oral daily  aspirin  chewable 81 milliGRAM(s) Oral daily  atorvastatin 80 milliGRAM(s) Oral at bedtime  carbidopa/levodopa  25/100 2 Tablet(s) Oral three times a day  dextrose 5%. 1000 milliLiter(s) (50 mL/Hr) IV Continuous <Continuous>  dextrose 5%. 1000 milliLiter(s) (50 mL/Hr) IV Continuous <Continuous>  dextrose 50% Injectable 12.5 Gram(s) IV Push once  dextrose 50% Injectable 25 Gram(s) IV Push once  dextrose 50% Injectable 25 Gram(s) IV Push once  heparin  Infusion.  Unit(s)/Hr (10 mL/Hr) IV Continuous <Continuous>  hydrochlorothiazide 25 milliGRAM(s) Oral at bedtime  influenza   Vaccine 0.5 milliLiter(s) IntraMuscular once  insulin glargine Injectable (LANTUS) 22 Unit(s) SubCutaneous every morning  insulin lispro (HumaLOG) corrective regimen sliding scale   SubCutaneous at bedtime  insulin lispro (HumaLOG) corrective regimen sliding scale   SubCutaneous three times a day before meals  metoprolol tartrate 12.5 milliGRAM(s) Oral two times a day  pantoprazole    Tablet 40 milliGRAM(s) Oral before breakfast  sodium chloride 0.9% lock flush 3 milliLiter(s) IV Push every 8 hours  sodium chloride 0.9%. 500 milliLiter(s) (75 mL/Hr) IV Continuous <Continuous>  trihexyphenidyl 2 milliGRAM(s) Oral three times a day    MEDICATIONS  (PRN):  dextrose 40% Gel 15 Gram(s) Oral once PRN Blood Glucose LESS THAN 70 milliGRAM(s)/deciliter  glucagon  Injectable 1 milliGRAM(s) IntraMuscular once PRN Glucose LESS THAN 70 milligrams/deciliter  heparin  Injectable 6000 Unit(s) IV Push every 6 hours PRN For aPTT less than 40    Vital Signs Last 24 Hrs  T(C): 36.8 (25 Sep 2019 06:23), Max: 36.8 (25 Sep 2019 06:23)  T(F): 98.3 (25 Sep 2019 06:23), Max: 98.3 (25 Sep 2019 06:23)  HR: 61 (25 Sep 2019 06:23) (61 - 73)  BP: 134/68 (25 Sep 2019 06:23) (126/60 - 159/67)  BP(mean): --  RR: 18 (25 Sep 2019 06:23) (16 - 18)  SpO2: 100% (24 Sep 2019 21:40) (99% - 100%)    I&O's Summary        Physical Exam:   GENERAL: NAD, well-groomed, well-developed  HEENT: BRANDEN/   Atraumatic, Normocephalic  ENMT: No tonsillar erythema, exudates, or enlargement; Moist mucous membranes, Good dentition, No lesions  NECK: Supple, No JVD, Normal thyroid  CHEST/LUNG: Clear to auscultaion, ; No rales, rhonchi, wheezing, or rubs  CVS: Regular rate and rhythm; No murmurs, rubs, or gallops  GI: : Soft, Nontender, Nondistended; Bowel sounds present  NERVOUS SYSTEM:  Alert & Oriented X3  EXTREMITIES:  2+ Peripheral Pulses, No clubbing, cyanosis, or edema  LYMPH: No lymphadenopathy noted  SKIN: No rashes or lesions  ENDOCRINOLOGY: No Thyromegaly  PSYCH: Appropriate    Labs:                              11.4   9.13  )-----------( 237      ( 25 Sep 2019 06:50 )             33.9                         11.3   8.78  )-----------( 224      ( 24 Sep 2019 06:35 )             33.5                         12.8   10.13 )-----------( 256      ( 23 Sep 2019 06:15 )             38.1                         10.9   9.59  )-----------( 237      ( 22 Sep 2019 05:30 )             32.6                         11.2   10.26 )-----------( 244      ( 21 Sep 2019 23:30 )             33.5                         11.3   12.15 )-----------( 264      ( 21 Sep 2019 15:30 )             32.9     09-25    135  |  99  |  43<H>  ----------------------------<  129<H>  3.9   |  21<L>  |  2.44<H>  09-24    135  |  99  |  39<H>  ----------------------------<  145<H>  4.0   |  20<L>  |  2.15<H>  09-23    138  |  100  |  38<H>  ----------------------------<  119<H>  4.1   |  23  |  2.33<H>  09-22    139  |  98  |  39<H>  ----------------------------<  147<H>  4.0   |  22  |  2.14<H>  09-21    137  |  101  |  42<H>  ----------------------------<  241<H>  4.0   |  22  |  2.28<H>    Ca    9.8      25 Sep 2019 06:50  Ca    9.9      24 Sep 2019 06:35  Phos  3.8     09-25  Phos  3.5     09-24  Mg     1.9     09-25  Mg     1.8     09-24    TPro  7.6  /  Alb  3.8  /  TBili  0.3  /  DBili  x   /  AST  11  /  ALT  < 4<L>  /  AlkPhos  74  09-21    CAPILLARY BLOOD GLUCOSE      POCT Blood Glucose.: 131 mg/dL (25 Sep 2019 07:47)  POCT Blood Glucose.: 143 mg/dL (24 Sep 2019 21:54)  POCT Blood Glucose.: 115 mg/dL (24 Sep 2019 17:12)        PTT - ( 25 Sep 2019 06:50 )  PTT:78.6 SEC    D-Dimer Assay, Quantitative: 342 ng/mL (09-22 @ 13:00)        RECENT CULTURES:        RESPIRATORY CULTURES:      < from: Xray Chest 1 View- PORTABLE-Urgent (09.24.19 @ 10:53) >  :  XR CHEST PORTABLE URGENT 1V        PROCEDURE DATE:  Sep 24 2019         INTERPRETATION:  TIME OF EXAM: September 24, 2019 at 10:43 AM.    CLINICAL INFORMATION: Needed for VQ scan.    COMPARISON:  September 21, 2019    TECHNIQUE:   AP Portable chest x-ray. Jewelry obscures part of the upper   chest.    INTERPRETATION:     Heart size and the mediastinum cannot be accurately evaluated on this   projection. The thoracic aorta is calcified.  The visualized lungs are clear. No pleural effusionor pneumothorax is   seen.  There is osteoarthritic degenerative change of the spine.            IMPRESSION:  The visualized lungs are clear.                  BOBBY MATA M.D., ATTENDING RADIOLOGIST  This document has been electronically signed. Sep24 2019 11:11AM        < end of copied text >      Studies  Chest X-RAY  CT SCAN Chest   Venous Dopplers: LE:   CT Abdomen  Others

## 2019-09-26 DIAGNOSIS — I25.10 ATHEROSCLEROTIC HEART DISEASE OF NATIVE CORONARY ARTERY WITHOUT ANGINA PECTORIS: ICD-10-CM

## 2019-09-26 LAB
ALBUMIN SERPL ELPH-MCNC: 3.6 G/DL — SIGNIFICANT CHANGE UP (ref 3.3–5)
ALBUMIN SERPL ELPH-MCNC: 3.9 G/DL — SIGNIFICANT CHANGE UP (ref 3.3–5)
ALP SERPL-CCNC: 73 U/L — SIGNIFICANT CHANGE UP (ref 40–120)
ALP SERPL-CCNC: 73 U/L — SIGNIFICANT CHANGE UP (ref 40–120)
ALT FLD-CCNC: 5 U/L — LOW (ref 10–45)
ALT FLD-CCNC: 7 U/L — LOW (ref 10–45)
ANION GAP SERPL CALC-SCNC: 12 MMOL/L — SIGNIFICANT CHANGE UP (ref 5–17)
ANION GAP SERPL CALC-SCNC: 14 MMOL/L — SIGNIFICANT CHANGE UP (ref 5–17)
APPEARANCE UR: CLEAR — SIGNIFICANT CHANGE UP
APTT BLD: 60.1 SEC — HIGH (ref 27.5–36.3)
AST SERPL-CCNC: 23 U/L — SIGNIFICANT CHANGE UP (ref 10–40)
AST SERPL-CCNC: 27 U/L — SIGNIFICANT CHANGE UP (ref 10–40)
BASOPHILS # BLD AUTO: 0.07 K/UL — SIGNIFICANT CHANGE UP (ref 0–0.2)
BASOPHILS NFR BLD AUTO: 0.7 % — SIGNIFICANT CHANGE UP (ref 0–2)
BILIRUB SERPL-MCNC: 0.3 MG/DL — SIGNIFICANT CHANGE UP (ref 0.2–1.2)
BILIRUB SERPL-MCNC: 0.5 MG/DL — SIGNIFICANT CHANGE UP (ref 0.2–1.2)
BILIRUB UR-MCNC: NEGATIVE — SIGNIFICANT CHANGE UP
BLD GP AB SCN SERPL QL: NEGATIVE — SIGNIFICANT CHANGE UP
BUN SERPL-MCNC: 41 MG/DL — HIGH (ref 7–23)
BUN SERPL-MCNC: 43 MG/DL — HIGH (ref 7–23)
CALCIUM SERPL-MCNC: 9.6 MG/DL — SIGNIFICANT CHANGE UP (ref 8.4–10.5)
CALCIUM SERPL-MCNC: 9.8 MG/DL — SIGNIFICANT CHANGE UP (ref 8.4–10.5)
CHLORIDE SERPL-SCNC: 101 MMOL/L — SIGNIFICANT CHANGE UP (ref 96–108)
CHLORIDE SERPL-SCNC: 102 MMOL/L — SIGNIFICANT CHANGE UP (ref 96–108)
CO2 SERPL-SCNC: 22 MMOL/L — SIGNIFICANT CHANGE UP (ref 22–31)
CO2 SERPL-SCNC: 25 MMOL/L — SIGNIFICANT CHANGE UP (ref 22–31)
COLOR SPEC: COLORLESS — SIGNIFICANT CHANGE UP
CREAT SERPL-MCNC: 2.38 MG/DL — HIGH (ref 0.5–1.3)
CREAT SERPL-MCNC: 2.45 MG/DL — HIGH (ref 0.5–1.3)
DIFF PNL FLD: NEGATIVE — SIGNIFICANT CHANGE UP
EOSINOPHIL # BLD AUTO: 0.77 K/UL — HIGH (ref 0–0.5)
EOSINOPHIL NFR BLD AUTO: 7.9 % — HIGH (ref 0–6)
ESTIMATED AVERAGE GLUCOSE: 160 MG/DL — HIGH (ref 68–114)
GLUCOSE SERPL-MCNC: 105 MG/DL — HIGH (ref 70–99)
GLUCOSE SERPL-MCNC: 192 MG/DL — HIGH (ref 70–99)
GLUCOSE UR QL: NEGATIVE — SIGNIFICANT CHANGE UP
HBA1C BLD-MCNC: 7.2 % — HIGH (ref 4–5.6)
HBA1C BLD-MCNC: 7.2 % — HIGH (ref 4–5.6)
HCT VFR BLD CALC: 34.2 % — LOW (ref 39–50)
HGB BLD-MCNC: 11.5 G/DL — LOW (ref 13–17)
IMM GRANULOCYTES NFR BLD AUTO: 0.2 % — SIGNIFICANT CHANGE UP (ref 0–1.5)
INR BLD: 1.13 RATIO — SIGNIFICANT CHANGE UP (ref 0.88–1.16)
KETONES UR-MCNC: NEGATIVE — SIGNIFICANT CHANGE UP
LEUKOCYTE ESTERASE UR-ACNC: NEGATIVE — SIGNIFICANT CHANGE UP
LYMPHOCYTES # BLD AUTO: 2.73 K/UL — SIGNIFICANT CHANGE UP (ref 1–3.3)
LYMPHOCYTES # BLD AUTO: 27.9 % — SIGNIFICANT CHANGE UP (ref 13–44)
MCHC RBC-ENTMCNC: 30.7 PG — SIGNIFICANT CHANGE UP (ref 27–34)
MCHC RBC-ENTMCNC: 33.6 GM/DL — SIGNIFICANT CHANGE UP (ref 32–36)
MCV RBC AUTO: 91.4 FL — SIGNIFICANT CHANGE UP (ref 80–100)
MONOCYTES # BLD AUTO: 0.74 K/UL — SIGNIFICANT CHANGE UP (ref 0–0.9)
MONOCYTES NFR BLD AUTO: 7.6 % — SIGNIFICANT CHANGE UP (ref 2–14)
MRSA PCR RESULT.: SIGNIFICANT CHANGE UP
NEUTROPHILS # BLD AUTO: 5.44 K/UL — SIGNIFICANT CHANGE UP (ref 1.8–7.4)
NEUTROPHILS NFR BLD AUTO: 55.7 % — SIGNIFICANT CHANGE UP (ref 43–77)
NITRITE UR-MCNC: NEGATIVE — SIGNIFICANT CHANGE UP
NT-PROBNP SERPL-SCNC: 212 PG/ML — SIGNIFICANT CHANGE UP (ref 0–300)
PA ADP PRP-ACNC: 126 PRU — LOW (ref 194–417)
PH UR: 6.5 — SIGNIFICANT CHANGE UP (ref 5–8)
PLATELET # BLD AUTO: 243 K/UL — SIGNIFICANT CHANGE UP (ref 150–400)
POTASSIUM SERPL-MCNC: 4.2 MMOL/L — SIGNIFICANT CHANGE UP (ref 3.5–5.3)
POTASSIUM SERPL-MCNC: 4.2 MMOL/L — SIGNIFICANT CHANGE UP (ref 3.5–5.3)
POTASSIUM SERPL-SCNC: 4.2 MMOL/L — SIGNIFICANT CHANGE UP (ref 3.5–5.3)
POTASSIUM SERPL-SCNC: 4.2 MMOL/L — SIGNIFICANT CHANGE UP (ref 3.5–5.3)
PROT SERPL-MCNC: 7.5 G/DL — SIGNIFICANT CHANGE UP (ref 6–8.3)
PROT SERPL-MCNC: 7.5 G/DL — SIGNIFICANT CHANGE UP (ref 6–8.3)
PROT UR-MCNC: ABNORMAL
PROTHROM AB SERPL-ACNC: 13 SEC — HIGH (ref 10–12.9)
RBC # BLD: 3.74 M/UL — LOW (ref 4.2–5.8)
RBC # FLD: 13.1 % — SIGNIFICANT CHANGE UP (ref 10.3–14.5)
RH IG SCN BLD-IMP: POSITIVE — SIGNIFICANT CHANGE UP
S AUREUS DNA NOSE QL NAA+PROBE: DETECTED
SODIUM SERPL-SCNC: 137 MMOL/L — SIGNIFICANT CHANGE UP (ref 135–145)
SODIUM SERPL-SCNC: 139 MMOL/L — SIGNIFICANT CHANGE UP (ref 135–145)
SP GR SPEC: 1.01 — LOW (ref 1.01–1.02)
TSH SERPL-MCNC: 6.22 UIU/ML — HIGH (ref 0.27–4.2)
UROBILINOGEN FLD QL: NEGATIVE — SIGNIFICANT CHANGE UP
WBC # BLD: 9.77 K/UL — SIGNIFICANT CHANGE UP (ref 3.8–10.5)
WBC # FLD AUTO: 9.77 K/UL — SIGNIFICANT CHANGE UP (ref 3.8–10.5)

## 2019-09-26 PROCEDURE — 94010 BREATHING CAPACITY TEST: CPT | Mod: 26

## 2019-09-26 PROCEDURE — 93306 TTE W/DOPPLER COMPLETE: CPT | Mod: 26

## 2019-09-26 PROCEDURE — 93880 EXTRACRANIAL BILAT STUDY: CPT | Mod: 26

## 2019-09-26 PROCEDURE — 99231 SBSQ HOSP IP/OBS SF/LOW 25: CPT

## 2019-09-26 RX ORDER — LEVOTHYROXINE SODIUM 125 MCG
50 TABLET ORAL DAILY
Refills: 0 | Status: DISCONTINUED | OUTPATIENT
Start: 2019-09-26 | End: 2019-10-02

## 2019-09-26 RX ORDER — INSULIN LISPRO 100/ML
3 VIAL (ML) SUBCUTANEOUS
Refills: 0 | Status: DISCONTINUED | OUTPATIENT
Start: 2019-09-26 | End: 2019-09-29

## 2019-09-26 RX ORDER — ENOXAPARIN SODIUM 100 MG/ML
30 INJECTION SUBCUTANEOUS
Refills: 0 | Status: DISCONTINUED | OUTPATIENT
Start: 2019-09-26 | End: 2019-09-30

## 2019-09-26 RX ORDER — INSULIN LISPRO 100/ML
VIAL (ML) SUBCUTANEOUS AT BEDTIME
Refills: 0 | Status: DISCONTINUED | OUTPATIENT
Start: 2019-09-26 | End: 2019-10-02

## 2019-09-26 RX ORDER — INSULIN LISPRO 100/ML
VIAL (ML) SUBCUTANEOUS
Refills: 0 | Status: DISCONTINUED | OUTPATIENT
Start: 2019-09-26 | End: 2019-10-02

## 2019-09-26 RX ORDER — INSULIN GLARGINE 100 [IU]/ML
20 INJECTION, SOLUTION SUBCUTANEOUS AT BEDTIME
Refills: 0 | Status: DISCONTINUED | OUTPATIENT
Start: 2019-09-26 | End: 2019-10-02

## 2019-09-26 RX ADMIN — ENOXAPARIN SODIUM 30 MILLIGRAM(S): 100 INJECTION SUBCUTANEOUS at 18:01

## 2019-09-26 RX ADMIN — Medication 12.5 MILLIGRAM(S): at 05:32

## 2019-09-26 RX ADMIN — SODIUM CHLORIDE 3 MILLILITER(S): 9 INJECTION INTRAMUSCULAR; INTRAVENOUS; SUBCUTANEOUS at 22:29

## 2019-09-26 RX ADMIN — PANTOPRAZOLE SODIUM 40 MILLIGRAM(S): 20 TABLET, DELAYED RELEASE ORAL at 05:32

## 2019-09-26 RX ADMIN — Medication 81 MILLIGRAM(S): at 13:07

## 2019-09-26 RX ADMIN — CARBIDOPA AND LEVODOPA 2 TABLET(S): 25; 100 TABLET ORAL at 13:07

## 2019-09-26 RX ADMIN — INSULIN GLARGINE 20 UNIT(S): 100 INJECTION, SOLUTION SUBCUTANEOUS at 22:36

## 2019-09-26 RX ADMIN — Medication 3 UNIT(S): at 13:09

## 2019-09-26 RX ADMIN — Medication 1 DROP(S): at 18:01

## 2019-09-26 RX ADMIN — ATORVASTATIN CALCIUM 80 MILLIGRAM(S): 80 TABLET, FILM COATED ORAL at 22:37

## 2019-09-26 RX ADMIN — Medication 12.5 MILLIGRAM(S): at 18:02

## 2019-09-26 RX ADMIN — Medication 100 MILLIGRAM(S): at 22:36

## 2019-09-26 RX ADMIN — Medication 1: at 13:09

## 2019-09-26 RX ADMIN — Medication 100 MILLIGRAM(S): at 13:07

## 2019-09-26 RX ADMIN — SODIUM CHLORIDE 3 MILLILITER(S): 9 INJECTION INTRAMUSCULAR; INTRAVENOUS; SUBCUTANEOUS at 05:28

## 2019-09-26 RX ADMIN — SODIUM CHLORIDE 3 MILLILITER(S): 9 INJECTION INTRAMUSCULAR; INTRAVENOUS; SUBCUTANEOUS at 13:08

## 2019-09-26 RX ADMIN — Medication 3 UNIT(S): at 18:02

## 2019-09-26 RX ADMIN — Medication 50 MICROGRAM(S): at 06:53

## 2019-09-26 RX ADMIN — Medication 2 MILLIGRAM(S): at 05:32

## 2019-09-26 RX ADMIN — CARBIDOPA AND LEVODOPA 2 TABLET(S): 25; 100 TABLET ORAL at 05:32

## 2019-09-26 RX ADMIN — CARBIDOPA AND LEVODOPA 2 TABLET(S): 25; 100 TABLET ORAL at 22:36

## 2019-09-26 RX ADMIN — Medication 40 MILLIGRAM(S): at 05:32

## 2019-09-26 RX ADMIN — SPIRONOLACTONE 25 MILLIGRAM(S): 25 TABLET, FILM COATED ORAL at 05:32

## 2019-09-26 RX ADMIN — Medication 2 MILLIGRAM(S): at 22:36

## 2019-09-26 RX ADMIN — Medication 2 MILLIGRAM(S): at 13:07

## 2019-09-26 NOTE — PROGRESS NOTE ADULT - SUBJECTIVE AND OBJECTIVE BOX
VITAL SIGNS    Telemetry:  SR 60  Vital Signs Last 24 Hrs  T(C): 36.4 (19 @ 05:22), Max: 36.9 (19 @ 12:30)  T(F): 97.5 (19 @ 05:22), Max: 98.4 (19 @ 12:30)  HR: 126 (19 @ 05:22) (57 - 126)  BP: 120/68 (19 @ 05:22) (120/68 - 175/80)  RR: 18 (19 @ 05:22) (18 - 18)  SpO2: 99% (19 @ 05:22) (96% - 99%)             @ 07:01  -   @ 07:00  --------------------------------------------------------  IN: 590 mL / OUT: 975 mL / NET: -385 mL     @ 07:01  -   @ 10:57  --------------------------------------------------------  IN: 240 mL / OUT: 225 mL / NET: 15 mL       Daily Height in cm: 180.34 (25 Sep 2019 21:00)    Daily Weight in k.3 (26 Sep 2019 09:05)  Admit Wt: Drug Dosing Weight  Height (cm): 180.3 (25 Sep 2019 21:00)  Weight (kg): 108.7 (25 Sep 2019 21:00)  BMI (kg/m2): 33.4 (25 Sep 2019 21:00)  BSA (m2): 2.28 (25 Sep 2019 21:00)    Bilirubin Total, Serum: 0.5 mg/dL ( @ 07:02)  Bilirubin Total, Serum: 0.3 mg/dL ( @ 23:47)    CAPILLARY BLOOD GLUCOSE      POCT Blood Glucose.: 128 mg/dL (25 Sep 2019 17:12)  POCT Blood Glucose.: 160 mg/dL (25 Sep 2019 12:18)          MEDICATIONS  aspirin enteric coated 81 milliGRAM(s) Oral daily  atorvastatin 80 milliGRAM(s) Oral at bedtime  carbidopa/levodopa  25/100 2 Tablet(s) Oral three times a day  docusate sodium 100 milliGRAM(s) Oral three times a day  enoxaparin Injectable 30 milliGRAM(s) SubCutaneous two times a day  furosemide    Tablet 40 milliGRAM(s) Oral daily  insulin glargine Injectable (LANTUS) 20 Unit(s) SubCutaneous at bedtime  insulin lispro (HumaLOG) corrective regimen sliding scale   SubCutaneous three times a day before meals  insulin lispro (HumaLOG) corrective regimen sliding scale   SubCutaneous at bedtime  insulin lispro Injectable (HumaLOG) 3 Unit(s) SubCutaneous three times a day before meals  levothyroxine 50 MICROGram(s) Oral daily  metoprolol tartrate 12.5 milliGRAM(s) Oral every 12 hours  pantoprazole    Tablet 40 milliGRAM(s) Oral before breakfast  sodium chloride 0.9% lock flush 3 milliLiter(s) IV Push every 8 hours  trihexyphenidyl 2 milliGRAM(s) Oral three times a day    PHYSICAL EXAM  Subjective: NAD  Neurology: alert and oriented x 3, nonfocal, no gross deficits  CV : s1s2  Lungs: CTA b/l  Abdomen: soft, NT,ND, (+ )BM  :  voiding  Extremities: -c/c/e      LABS      137  |  101  |  41<H>  ----------------------------<  192<H>  4.2   |  22  |  2.38<H>    Ca    9.8      26 Sep 2019 07:02  Phos  3.8       Mg     1.9         TPro  7.5  /  Alb  3.9  /  TBili  0.5  /  DBili  x   /  AST  27  /  ALT  5<L>  /  AlkPhos  73                                   11.5   9.77  )-----------( 243      ( 26 Sep 2019 08:41 )             34.2          PT/INR - ( 25 Sep 2019 23:47 )   PT: 13.0 sec;   INR: 1.13 ratio         PTT - ( 25 Sep 2019 23:47 )  PTT:60.1 sec  < from: Cardiac Cath Lab - Adult (19 @ 14:21) >  VENTRICLES: No LV gram was performed; however, a recent echocardiogram  demonstrated an EF of 45 %.  CORONARY VESSELS: The coronary circulation is right dominant.  LM:   --  LM: Normal.  LAD:   --  Proximal LAD: There was a tubular 70 % stenosis.  --  Mid LAD: Angiography showed minor luminal irregularities with no flow  limiting lesions.  --  Distal LAD: There was a tubular 70 % stenosis.  --  D1: Angiography showed mild atherosclerosis with no flow limiting  lesions.  CX:   --  Proximal circumflex: Angiography showed minor luminal  irregularities with no flow limiting lesions.  --  Mid circumflex: There was a tubular 90 % stenosis.  --  Distal circumflex: Angiography showed minor luminal irregularities with  no flow limiting lesions.  --  OM1: Angiography showed minor luminal irregularities with no flow  limiting lesions.  RCA:   --  Proximal RCA: Angiography showed mild atherosclerosis with no  flow limiting lesions.  --  Mid RCA: Angiography showed minor luminal irregularities with no flow  limiting lesions. There was no significant restenosis in mid RCA stent.  --  Distal RCA: There was a discrete 80 % stenosis.  --  RPDA: There was a 100 % stenosis with the distal vessel being filled  via collaterals. This lesion is a chronic total occlusion.  --  RPLS: Angiography showed minor luminal irregularities with no flow  limiting lesions.    < end of copied text >         PAST MEDICAL & SURGICAL HISTORY:  Shoulder pain, left  Osteoarthritis  Panic attacks  Urinary frequency  Urinary incontinence  Nocturia: x2-3  Gastroesophageal reflux disease without esophagitis: diet controlled  Vertigo: not improved with meclizine in past  Insomnia  Autoimmune disease: started on prednisone on 12/23/15 for &quot;inflammation&quot; but does not know diagnosis  Leg swelling: left leg  CAD (coronary artery disease): s/p 1 stent in   Parkinson disease  Hypercholesterolemia  Diabetes Mellitus, Type 2  Hypertension  S/P angioplasty with stent: 1 stent, RCA  Status Post Cardiac Catheterization

## 2019-09-26 NOTE — PROGRESS NOTE ADULT - ASSESSMENT
ASSESSMENT/PLAN:  68 yo M PMHx CAD s/p stent x 1 (2010), T2DM, HTN, Parkinson's and CKD stage 3b presents with chest pain.  Subnephrotic range proteinuria       1. Renal - JOR2y-8, ~3.5g/day proteinuria; Renal sono completed; Renal function overall stable, trend BMP.  Pt was told of the risk of ABBY post op and he understands the potential for HD.  trend renal function     2. CVS -Pending OR date with Dr Guillory   Will DC aldactone now     3. Pulm - LE dopplers negative for DVT, VQ scan pending       [facility]  [phone]

## 2019-09-26 NOTE — PROGRESS NOTE ADULT - SUBJECTIVE AND OBJECTIVE BOX
Patient denies CP, SOB Review of systems otherwise (-)    artificial  tears Solution 1 Drop(s) Both EYES four times a day PRN  aspirin enteric coated 81 milliGRAM(s) Oral daily  atorvastatin 80 milliGRAM(s) Oral at bedtime  carbidopa/levodopa  25/100 2 Tablet(s) Oral three times a day  docusate sodium 100 milliGRAM(s) Oral three times a day  enoxaparin Injectable 30 milliGRAM(s) SubCutaneous two times a day  furosemide    Tablet 40 milliGRAM(s) Oral daily  insulin glargine Injectable (LANTUS) 20 Unit(s) SubCutaneous at bedtime  insulin lispro (HumaLOG) corrective regimen sliding scale   SubCutaneous three times a day before meals  insulin lispro (HumaLOG) corrective regimen sliding scale   SubCutaneous at bedtime  insulin lispro Injectable (HumaLOG) 3 Unit(s) SubCutaneous three times a day before meals  levothyroxine 50 MICROGram(s) Oral daily  metoprolol tartrate 12.5 milliGRAM(s) Oral every 12 hours  pantoprazole    Tablet 40 milliGRAM(s) Oral before breakfast  sodium chloride 0.9% lock flush 3 milliLiter(s) IV Push every 8 hours  trihexyphenidyl 2 milliGRAM(s) Oral three times a day                            11.5   9.77  )-----------( 243      ( 26 Sep 2019 08:41 )             34.2       Hemoglobin: 11.5 g/dL (09-26 @ 08:41)  Hemoglobin: 11.8 g/dL (09-25 @ 23:47)  Hemoglobin: 11.4 g/dL (09-25 @ 06:50)  Hemoglobin: 11.4 g/dL (09-25 @ 06:50)  Hemoglobin: 11.3 g/dL (09-24 @ 06:35)      09-26    137  |  101  |  41<H>  ----------------------------<  192<H>  4.2   |  22  |  2.38<H>    Ca    9.8      26 Sep 2019 07:02  Phos  3.8     09-25  Mg     1.9     09-25    TPro  7.5  /  Alb  3.9  /  TBili  0.5  /  DBili  x   /  AST  27  /  ALT  5<L>  /  AlkPhos  73  09-26    Creatinine Trend: 2.38<--, 2.45<--, 2.44<--, 2.15<--, 2.33<--, 2.14<--    COAGS: PT/INR - ( 25 Sep 2019 23:47 )   PT: 13.0 sec;   INR: 1.13 ratio         PTT - ( 25 Sep 2019 23:47 )  PTT:60.1 sec          T(C): 36.5 (09-26-19 @ 13:46), Max: 36.5 (09-25-19 @ 21:00)  HR: 60 (09-26-19 @ 13:46) (57 - 126)  BP: 100/71 (09-26-19 @ 13:46) (100/71 - 175/80)  RR: 18 (09-26-19 @ 13:46) (18 - 18)  SpO2: 98% (09-26-19 @ 13:46) (96% - 99%)  Wt(kg): --    I&O's Summary    25 Sep 2019 07:01  -  26 Sep 2019 07:00  --------------------------------------------------------  IN: 590 mL / OUT: 975 mL / NET: -385 mL    26 Sep 2019 07:01  -  26 Sep 2019 16:46  --------------------------------------------------------  IN: 480 mL / OUT: 950 mL / NET: -470 mL      Gen: Appears well in NAD  HEENT:  (-)icterus (-)pallor  CV: N S1 S2 RRR no m/r/g   Resp:  Clear to ausculatation B/L, normal effort  GI: (+) BS Soft, NT, ND  Lymph:  + non-pitting edema bilaterally (-)obvious lymphadenopathy  Skin: Warm to touch, Normal turgor  Psych: Appropriate mood and affect    TELEMETRY: SR    < from: Transthoracic Echocardiogram (09.22.19 @ 08:18) >  CONCLUSIONS:  1. Mitral annular calcification, otherwise normal mitral  valve. Mild mitral regurgitation.  2. Increased relative wall thickness with normal left  ventricular mass index, consistent with concentric left  ventricular remodeling.  3. Mild segmental left ventricular systolic dysfunction.  The anteroseptal and apical walls appear hypokinetic.  4. Mild diastolic dysfunction (Stage I).  5. The right ventricle is not well visualized; grossly  normal right ventricular systolic function.  ------------------------------------------------------------------------  Confirmed on  9/22/2019 - 12:25:06 by Jae Junior MD, [facility],  [facility], [facility]  ------------------------------------------    < end of copied text >      ASSESSMENT/PLAN: 	  68 yo M PMHx CAD s/p stent x 1 (2010), T2DM, HTN, Parkinson's, history of meningioma, admitted with NSTEMI.     -Cardiac cath performed showing multivessel CAD  -cont current meds as listed  - Tx to [facility] for CTS eval  - Tentative OR next week    Thierry Shetty MD, [facility]  BEEPER [phone]

## 2019-09-26 NOTE — PROGRESS NOTE ADULT - ASSESSMENT
70 yo M PMHx CAD s/p stent x 1 (2010), T2DM, HTN, Parkinson's presenting with c/o 10/10 chest pain described as burning radiating from abdomen to midsternum x 1 day. Pt s/p cardiac cath at [facility] found to have LAD 70%, RCA 80% Cx 90% stenosis requiring transfer to [facility] CT surgery service.   9/26 Nephrology consulted, aldactone discontinued.

## 2019-09-26 NOTE — PROGRESS NOTE ADULT - PROBLEM SELECTOR PLAN 1
c/w bblocker, asa, statin  echo pending, carotid doppler pending  PT consult   Anticipate CABG Wednesday with Dr Guillory

## 2019-09-27 DIAGNOSIS — G20 PARKINSON'S DISEASE: ICD-10-CM

## 2019-09-27 DIAGNOSIS — E11.9 TYPE 2 DIABETES MELLITUS WITHOUT COMPLICATIONS: ICD-10-CM

## 2019-09-27 LAB
ANION GAP SERPL CALC-SCNC: 13 MMOL/L — SIGNIFICANT CHANGE UP (ref 5–17)
BUN SERPL-MCNC: 42 MG/DL — HIGH (ref 7–23)
CALCIUM SERPL-MCNC: 9.9 MG/DL — SIGNIFICANT CHANGE UP (ref 8.4–10.5)
CHLORIDE SERPL-SCNC: 98 MMOL/L — SIGNIFICANT CHANGE UP (ref 96–108)
CO2 SERPL-SCNC: 25 MMOL/L — SIGNIFICANT CHANGE UP (ref 22–31)
CREAT ?TM UR-MCNC: 56 MG/DL — SIGNIFICANT CHANGE UP
CREAT SERPL-MCNC: 2.89 MG/DL — HIGH (ref 0.5–1.3)
GLUCOSE SERPL-MCNC: 126 MG/DL — HIGH (ref 70–99)
HCT VFR BLD CALC: 35.8 % — LOW (ref 39–50)
HGB BLD-MCNC: 11.7 G/DL — LOW (ref 13–17)
MCHC RBC-ENTMCNC: 31 PG — SIGNIFICANT CHANGE UP (ref 27–34)
MCHC RBC-ENTMCNC: 32.7 GM/DL — SIGNIFICANT CHANGE UP (ref 32–36)
MCV RBC AUTO: 94.7 FL — SIGNIFICANT CHANGE UP (ref 80–100)
PLATELET # BLD AUTO: 214 K/UL — SIGNIFICANT CHANGE UP (ref 150–400)
POTASSIUM SERPL-MCNC: 3.8 MMOL/L — SIGNIFICANT CHANGE UP (ref 3.5–5.3)
POTASSIUM SERPL-SCNC: 3.8 MMOL/L — SIGNIFICANT CHANGE UP (ref 3.5–5.3)
PROT ?TM UR-MCNC: 56 MG/DL — HIGH (ref 0–12)
PROT/CREAT UR-RTO: 1 RATIO — HIGH (ref 0–0.2)
RBC # BLD: 3.78 M/UL — LOW (ref 4.2–5.8)
RBC # FLD: 12.3 % — SIGNIFICANT CHANGE UP (ref 10.3–14.5)
SODIUM SERPL-SCNC: 136 MMOL/L — SIGNIFICANT CHANGE UP (ref 135–145)
WBC # BLD: 10.5 K/UL — SIGNIFICANT CHANGE UP (ref 3.8–10.5)
WBC # FLD AUTO: 10.5 K/UL — SIGNIFICANT CHANGE UP (ref 3.8–10.5)

## 2019-09-27 PROCEDURE — 99232 SBSQ HOSP IP/OBS MODERATE 35: CPT

## 2019-09-27 RX ORDER — NYSTATIN CREAM 100000 [USP'U]/G
1 CREAM TOPICAL
Refills: 0 | Status: DISCONTINUED | OUTPATIENT
Start: 2019-09-27 | End: 2019-10-02

## 2019-09-27 RX ADMIN — CARBIDOPA AND LEVODOPA 2 TABLET(S): 25; 100 TABLET ORAL at 12:46

## 2019-09-27 RX ADMIN — Medication 81 MILLIGRAM(S): at 12:45

## 2019-09-27 RX ADMIN — Medication 3 UNIT(S): at 12:45

## 2019-09-27 RX ADMIN — Medication 2 MILLIGRAM(S): at 12:46

## 2019-09-27 RX ADMIN — Medication 40 MILLIGRAM(S): at 05:55

## 2019-09-27 RX ADMIN — SODIUM CHLORIDE 3 MILLILITER(S): 9 INJECTION INTRAMUSCULAR; INTRAVENOUS; SUBCUTANEOUS at 13:00

## 2019-09-27 RX ADMIN — SODIUM CHLORIDE 3 MILLILITER(S): 9 INJECTION INTRAMUSCULAR; INTRAVENOUS; SUBCUTANEOUS at 21:14

## 2019-09-27 RX ADMIN — Medication 50 MICROGRAM(S): at 05:54

## 2019-09-27 RX ADMIN — ENOXAPARIN SODIUM 30 MILLIGRAM(S): 100 INJECTION SUBCUTANEOUS at 05:55

## 2019-09-27 RX ADMIN — CARBIDOPA AND LEVODOPA 2 TABLET(S): 25; 100 TABLET ORAL at 21:13

## 2019-09-27 RX ADMIN — INSULIN GLARGINE 20 UNIT(S): 100 INJECTION, SOLUTION SUBCUTANEOUS at 21:59

## 2019-09-27 RX ADMIN — Medication 100 MILLIGRAM(S): at 21:13

## 2019-09-27 RX ADMIN — Medication 3 UNIT(S): at 07:47

## 2019-09-27 RX ADMIN — Medication 3 UNIT(S): at 17:11

## 2019-09-27 RX ADMIN — Medication 100 MILLIGRAM(S): at 05:55

## 2019-09-27 RX ADMIN — SODIUM CHLORIDE 3 MILLILITER(S): 9 INJECTION INTRAMUSCULAR; INTRAVENOUS; SUBCUTANEOUS at 05:58

## 2019-09-27 RX ADMIN — PANTOPRAZOLE SODIUM 40 MILLIGRAM(S): 20 TABLET, DELAYED RELEASE ORAL at 05:54

## 2019-09-27 RX ADMIN — Medication 2 MILLIGRAM(S): at 05:54

## 2019-09-27 RX ADMIN — CARBIDOPA AND LEVODOPA 2 TABLET(S): 25; 100 TABLET ORAL at 05:55

## 2019-09-27 RX ADMIN — Medication 2 MILLIGRAM(S): at 21:15

## 2019-09-27 RX ADMIN — ENOXAPARIN SODIUM 30 MILLIGRAM(S): 100 INJECTION SUBCUTANEOUS at 17:12

## 2019-09-27 RX ADMIN — NYSTATIN CREAM 1 APPLICATION(S): 100000 CREAM TOPICAL at 18:04

## 2019-09-27 RX ADMIN — ATORVASTATIN CALCIUM 80 MILLIGRAM(S): 80 TABLET, FILM COATED ORAL at 21:13

## 2019-09-27 RX ADMIN — Medication 12.5 MILLIGRAM(S): at 05:54

## 2019-09-27 RX ADMIN — Medication 12.5 MILLIGRAM(S): at 17:12

## 2019-09-27 NOTE — PROGRESS NOTE ADULT - PROBLEM SELECTOR PLAN 1
Pre op Cardiac surgery work up in progress  PT consult ordered   Continue with ASA 81 mg PO daily   Continue with Lopressor 12.5 mg PO BID   Continue with Lipitor 80 mg PO HS   Continue with Lovenox 30 mg SQ BID for ACS   Continue with Lasix 40 mg PO daily   Daily weights   Strict WINSTON's   Activity as tolerated   Plan for CABG Wednesday 10/2 with Dr Guillory

## 2019-09-27 NOTE — PROGRESS NOTE ADULT - ASSESSMENT
68 yo M PMHx CAD s/p stent x 1 (2010), T2DM, HTN, Parkinson's presenting with c/o 10/10 chest pain described as burning radiating from abdomen to midsternum x 1 day. Pt s/p cardiac cath at [facility] found to have LAD 70%, RCA 80% Cx 90% stenosis requiring transfer to [facility] CT surgery service.   Hospital Course:   9/26 Nephrology consulted, aldactone discontinued. Pre op Transthoracic Echocardiogram Mitral annular calcification, otherwise normal mitral  valve. Calcified trileaflet aortic valve with normal opening. Mild aortic regurgitation. Mild concentric left ventricular hypertrophy. Endocardium not well visualized; grossly normal left ventricular systolic function.  9/27 VVS; Pre op Cardiac Surgery work up in progress. PT evaluation to assess pre op baseline activity level.  Plan for cardiac surgery in next week Wednesday with Dr. Guillory 10/2/19.

## 2019-09-27 NOTE — PROGRESS NOTE ADULT - ASSESSMENT
ASSESSMENT/PLAN:  68 yo M PMHx CAD s/p stent x 1 (2010), T2DM, HTN, Parkinson's and CKD stage 3b presents with chest pain.         1. Renal - DNO5v-9, and the creatinine went up today.  Too far out to suggest AUBRIE.  For now supportive care;  trend BMP.  Pt was told of the risk of ABBY post op and he understands the potential for HD.  If the creatinine does not come down then may need to entertain HD prior?????  Re-order TP/Cre ratio   trend renal function   Renal sono done already     2. CVS  OR date with Dr Guillory for wed   Off  DC aldactone now   Echo noted    3. Pulm - LE dopplers negative for DVT       [facility]  [phone]

## 2019-09-27 NOTE — PROGRESS NOTE ADULT - SUBJECTIVE AND OBJECTIVE BOX
VITAL SIGNS    Subjective: "I'm feeling ok." Denies CP, palpitation, SOB, NEWBY, HA, dizziness, N/V/D, fever or chills.  No acute event noted overnight.     Telemetry: NSR 50-60       Vital Signs Last 24 Hrs  T(C): 36.7 (19 @ 14:24), Max: 36.7 (19 @ 20:14)  T(F): 98 (19 @ 14:24), Max: 98.1 (19 @ 20:14)  HR: 59 (19 @ 14:24) (55 - 68)  BP: 121/67 (19 @ 14:24) (121/67 - 163/80)  RR: 18 (19 @ 14:24) (18 - 18)  SpO2: 96% (19 @ 14:24) (96% - 100%)            @ 07:01  -   @ 07:00  --------------------------------------------------------  IN: 900 mL / OUT: 2125 mL / NET: -1225 mL     @ 07:01  -   @ 15:06  --------------------------------------------------------  IN: 720 mL / OUT: 550 mL / NET: 170 mL    Daily     Daily Weight in k.4 (27 Sep 2019 08:29)    CAPILLARY BLOOD GLUCOSE    POCT Blood Glucose.: 142 mg/dL (27 Sep 2019 12:30)  POCT Blood Glucose.: 134 mg/dL (27 Sep 2019 07:43)  POCT Blood Glucose.: 129 mg/dL (26 Sep 2019 21:54)  POCT Blood Glucose.: 122 mg/dL (26 Sep 2019 17:46)                     PHYSICAL EXAM    Neurology: alert and oriented x 3, nonfocal, no gross deficits    CV: (+) S1 and S2, No murmurs, rubs, gallops or clicks     Lungs: CTA B/L     Abdomen: soft, nontender, nondistended, positive bowel sounds, (+) Flatus; (+) BM     :  Voiding               Extremities:  B/L LE (-) edema; negative calf tenderness; (+) 2 DP palpable        artificial  tears Solution 1 Drop(s) Both EYES four times a day PRN  aspirin enteric coated 81 milliGRAM(s) Oral daily  atorvastatin 80 milliGRAM(s) Oral at bedtime  carbidopa/levodopa  25/100 2 Tablet(s) Oral three times a day  docusate sodium 100 milliGRAM(s) Oral three times a day  enoxaparin Injectable 30 milliGRAM(s) SubCutaneous two times a day  furosemide Tablet 40 milliGRAM(s) Oral daily  insulin glargine Injectable (LANTUS) 20 Unit(s) SubCutaneous at bedtime  insulin lispro (HumaLOG) corrective regimen sliding scale SubCutaneous three times a day before meals  insulin lispro (HumaLOG) corrective regimen sliding scale SubCutaneous at bedtime  insulin lispro Injectable (HumaLOG) 3 Unit(s) SubCutaneous three times a day before meals  levothyroxine 50 MICROGram(s) Oral daily  metoprolol tartrate 12.5 milliGRAM(s) Oral every 12 hours  pantoprazole Tablet 40 milliGRAM(s) Oral before breakfast  sodium chloride 0.9% lock flush 3 milliLiter(s) IV Push every 8 hours  trihexyphenidyl 2 milliGRAM(s) Oral three times a day    Physical Therapy Rec:   Home  [  ]   Home w/ PT  [ X ]  Rehab  [  ]    Discussed with Cardiothoracic Team at AM rounds.

## 2019-09-27 NOTE — PROGRESS NOTE ADULT - SUBJECTIVE AND OBJECTIVE BOX
S: Denies chest pain or SOB. Review of systems otherwise (-)      MEDICATIONS  (STANDING):  aspirin enteric coated 81 milliGRAM(s) Oral daily  atorvastatin 80 milliGRAM(s) Oral at bedtime  carbidopa/levodopa  25/100 2 Tablet(s) Oral three times a day  docusate sodium 100 milliGRAM(s) Oral three times a day  enoxaparin Injectable 30 milliGRAM(s) SubCutaneous two times a day  furosemide    Tablet 40 milliGRAM(s) Oral daily  insulin glargine Injectable (LANTUS) 20 Unit(s) SubCutaneous at bedtime  insulin lispro (HumaLOG) corrective regimen sliding scale   SubCutaneous three times a day before meals  insulin lispro (HumaLOG) corrective regimen sliding scale   SubCutaneous at bedtime  insulin lispro Injectable (HumaLOG) 3 Unit(s) SubCutaneous three times a day before meals  levothyroxine 50 MICROGram(s) Oral daily  metoprolol tartrate 12.5 milliGRAM(s) Oral every 12 hours  pantoprazole    Tablet 40 milliGRAM(s) Oral before breakfast  sodium chloride 0.9% lock flush 3 milliLiter(s) IV Push every 8 hours  trihexyphenidyl 2 milliGRAM(s) Oral three times a day    MEDICATIONS  (PRN):  artificial  tears Solution 1 Drop(s) Both EYES four times a day PRN dryness      LABS:                            11.7   10.5  )-----------( 214      ( 27 Sep 2019 06:03 )             35.8     Hemoglobin: 11.7 g/dL (09-27 @ 06:03)  Hemoglobin: 11.5 g/dL (09-26 @ 08:41)  Hemoglobin: 11.8 g/dL (09-25 @ 23:47)  Hemoglobin: 11.4 g/dL (09-25 @ 06:50)  Hemoglobin: 11.4 g/dL (09-25 @ 06:50)    09-27    136  |  98  |  42<H>  ----------------------------<  126<H>  3.8   |  25  |  2.89<H>    Ca    9.9      27 Sep 2019 06:03    TPro  7.5  /  Alb  3.9  /  TBili  0.5  /  DBili  x   /  AST  27  /  ALT  5<L>  /  AlkPhos  73  09-26    Creatinine Trend: 2.89<--, 2.38<--, 2.45<--, 2.44<--, 2.15<--, 2.33<--             09-26-19 @ 07:01  -  09-27-19 @ 07:00  --------------------------------------------------------  IN: 900 mL / OUT: 2125 mL / NET: -1225 mL    09-27-19 @ 07:01  -  09-27-19 @ 11:53  --------------------------------------------------------  IN: 360 mL / OUT: 0 mL / NET: 360 mL        PHYSICAL EXAM  Vital Signs Last 24 Hrs  T(C): 36.6 (27 Sep 2019 04:24), Max: 36.7 (26 Sep 2019 20:14)  T(F): 97.8 (27 Sep 2019 04:24), Max: 98.1 (26 Sep 2019 20:14)  HR: 55 (27 Sep 2019 04:24) (55 - 68)  BP: 142/72 (27 Sep 2019 05:50) (100/71 - 163/80)  BP(mean): --  RR: 18 (27 Sep 2019 04:24) (18 - 18)  SpO2: 100% (27 Sep 2019 04:24) (97% - 100%)      Gen: Appears well in NAD  HEENT:  (-)icterus (-)pallor  CV: N S1 S2 RRR no m/r/g   Resp:  Clear to auscultation B/L, normal effort  GI: (+) BS Soft, NT, ND  Lymph:  + non-pitting edema bilaterally (-)obvious lymphadenopathy  Skin: Warm to touch, Normal turgor  Psych: Appropriate mood and affect    TELEMETRY: SB/SR 50-70s    < from: Transthoracic Echocardiogram (09.22.19 @ 08:18) >  CONCLUSIONS:  1. Mitral annular calcification, otherwise normal mitral  valve. Mild mitral regurgitation.  2. Increased relative wall thickness with normal left  ventricular mass index, consistent with concentric left  ventricular remodeling.  3. Mild segmental left ventricular systolic dysfunction.  The anteroseptal and apical walls appear hypokinetic.  4. Mild diastolic dysfunction (Stage I).  5. The right ventricle is not well visualized; grossly  normal right ventricular systolic function.  ------------------------------------------------------------------------  Confirmed on  9/22/2019 - 12:25:06 by Jae Junior MD, [facility],  Formerly [facility], [facility]  ------------------------------------------    < end of copied text >      ASSESSMENT/PLAN: 	  70 yo M PMHx CAD s/p stent x 1 (2010), T2DM, HTN, Parkinson's, history of meningioma, admitted with NSTEMI.     -Cardiac cath performed showing multivessel CAD  -Continue ASA/Statin  -Tolerating Lopressor  -Renal f/u appreciated - trend creatinine  -CABG eval per CTS  -Tentative OR next week    Yunier Quiroz PA-C  [facility]  Pager: [phone]

## 2019-09-28 DIAGNOSIS — R00.1 BRADYCARDIA, UNSPECIFIED: ICD-10-CM

## 2019-09-28 LAB
ANION GAP SERPL CALC-SCNC: 12 MMOL/L — SIGNIFICANT CHANGE UP (ref 5–17)
BUN SERPL-MCNC: 43 MG/DL — HIGH (ref 7–23)
CALCIUM SERPL-MCNC: 10 MG/DL — SIGNIFICANT CHANGE UP (ref 8.4–10.5)
CHLORIDE SERPL-SCNC: 100 MMOL/L — SIGNIFICANT CHANGE UP (ref 96–108)
CO2 SERPL-SCNC: 25 MMOL/L — SIGNIFICANT CHANGE UP (ref 22–31)
CREAT SERPL-MCNC: 2.77 MG/DL — HIGH (ref 0.5–1.3)
GLUCOSE SERPL-MCNC: 101 MG/DL — HIGH (ref 70–99)
HCT VFR BLD CALC: 38 % — LOW (ref 39–50)
HGB BLD-MCNC: 12.3 G/DL — LOW (ref 13–17)
MCHC RBC-ENTMCNC: 30.8 PG — SIGNIFICANT CHANGE UP (ref 27–34)
MCHC RBC-ENTMCNC: 32.4 GM/DL — SIGNIFICANT CHANGE UP (ref 32–36)
MCV RBC AUTO: 95.2 FL — SIGNIFICANT CHANGE UP (ref 80–100)
PLATELET # BLD AUTO: 237 K/UL — SIGNIFICANT CHANGE UP (ref 150–400)
POTASSIUM SERPL-MCNC: 4.2 MMOL/L — SIGNIFICANT CHANGE UP (ref 3.5–5.3)
POTASSIUM SERPL-SCNC: 4.2 MMOL/L — SIGNIFICANT CHANGE UP (ref 3.5–5.3)
RBC # BLD: 3.99 M/UL — LOW (ref 4.2–5.8)
RBC # FLD: 12.2 % — SIGNIFICANT CHANGE UP (ref 10.3–14.5)
SODIUM SERPL-SCNC: 137 MMOL/L — SIGNIFICANT CHANGE UP (ref 135–145)
WBC # BLD: 10.1 K/UL — SIGNIFICANT CHANGE UP (ref 3.8–10.5)
WBC # FLD AUTO: 10.1 K/UL — SIGNIFICANT CHANGE UP (ref 3.8–10.5)

## 2019-09-28 PROCEDURE — 99231 SBSQ HOSP IP/OBS SF/LOW 25: CPT

## 2019-09-28 RX ORDER — MUPIROCIN 20 MG/G
1 OINTMENT TOPICAL
Refills: 0 | Status: DISCONTINUED | OUTPATIENT
Start: 2019-09-28 | End: 2019-10-02

## 2019-09-28 RX ADMIN — ATORVASTATIN CALCIUM 80 MILLIGRAM(S): 80 TABLET, FILM COATED ORAL at 21:23

## 2019-09-28 RX ADMIN — ENOXAPARIN SODIUM 30 MILLIGRAM(S): 100 INJECTION SUBCUTANEOUS at 06:01

## 2019-09-28 RX ADMIN — SODIUM CHLORIDE 3 MILLILITER(S): 9 INJECTION INTRAMUSCULAR; INTRAVENOUS; SUBCUTANEOUS at 13:37

## 2019-09-28 RX ADMIN — SODIUM CHLORIDE 3 MILLILITER(S): 9 INJECTION INTRAMUSCULAR; INTRAVENOUS; SUBCUTANEOUS at 21:26

## 2019-09-28 RX ADMIN — Medication 3 UNIT(S): at 08:20

## 2019-09-28 RX ADMIN — ENOXAPARIN SODIUM 30 MILLIGRAM(S): 100 INJECTION SUBCUTANEOUS at 17:30

## 2019-09-28 RX ADMIN — Medication 3 UNIT(S): at 12:14

## 2019-09-28 RX ADMIN — CARBIDOPA AND LEVODOPA 2 TABLET(S): 25; 100 TABLET ORAL at 05:58

## 2019-09-28 RX ADMIN — PANTOPRAZOLE SODIUM 40 MILLIGRAM(S): 20 TABLET, DELAYED RELEASE ORAL at 06:00

## 2019-09-28 RX ADMIN — MUPIROCIN 1 APPLICATION(S): 20 OINTMENT TOPICAL at 17:32

## 2019-09-28 RX ADMIN — NYSTATIN CREAM 1 APPLICATION(S): 100000 CREAM TOPICAL at 17:30

## 2019-09-28 RX ADMIN — CARBIDOPA AND LEVODOPA 2 TABLET(S): 25; 100 TABLET ORAL at 13:36

## 2019-09-28 RX ADMIN — Medication 100 MILLIGRAM(S): at 05:58

## 2019-09-28 RX ADMIN — NYSTATIN CREAM 1 APPLICATION(S): 100000 CREAM TOPICAL at 06:01

## 2019-09-28 RX ADMIN — Medication 100 MILLIGRAM(S): at 21:23

## 2019-09-28 RX ADMIN — Medication 100 MILLIGRAM(S): at 13:36

## 2019-09-28 RX ADMIN — Medication 1: at 17:26

## 2019-09-28 RX ADMIN — Medication 81 MILLIGRAM(S): at 11:50

## 2019-09-28 RX ADMIN — INSULIN GLARGINE 20 UNIT(S): 100 INJECTION, SOLUTION SUBCUTANEOUS at 22:28

## 2019-09-28 RX ADMIN — SODIUM CHLORIDE 3 MILLILITER(S): 9 INJECTION INTRAMUSCULAR; INTRAVENOUS; SUBCUTANEOUS at 06:06

## 2019-09-28 RX ADMIN — Medication 3 UNIT(S): at 17:27

## 2019-09-28 RX ADMIN — Medication 40 MILLIGRAM(S): at 06:00

## 2019-09-28 RX ADMIN — Medication 2 MILLIGRAM(S): at 13:36

## 2019-09-28 RX ADMIN — Medication 2 MILLIGRAM(S): at 06:07

## 2019-09-28 RX ADMIN — Medication 2 MILLIGRAM(S): at 21:23

## 2019-09-28 RX ADMIN — Medication 1: at 12:14

## 2019-09-28 RX ADMIN — CARBIDOPA AND LEVODOPA 2 TABLET(S): 25; 100 TABLET ORAL at 21:23

## 2019-09-28 RX ADMIN — Medication 50 MICROGRAM(S): at 06:00

## 2019-09-28 NOTE — PROGRESS NOTE ADULT - SUBJECTIVE AND OBJECTIVE BOX
[facility]-Davina Barnard NP- PROGRESS NOTE    Patient seen and examined OOB to chair in NAD talking on the phone.       Hospital Medications:   MEDICATIONS  (STANDING):  aspirin enteric coated 81 milliGRAM(s) Oral daily  atorvastatin 80 milliGRAM(s) Oral at bedtime  carbidopa/levodopa  25/100 2 Tablet(s) Oral three times a day  docusate sodium 100 milliGRAM(s) Oral three times a day  enoxaparin Injectable 30 milliGRAM(s) SubCutaneous two times a day  furosemide    Tablet 40 milliGRAM(s) Oral daily  insulin glargine Injectable (LANTUS) 20 Unit(s) SubCutaneous at bedtime  insulin lispro (HumaLOG) corrective regimen sliding scale   SubCutaneous three times a day before meals  insulin lispro (HumaLOG) corrective regimen sliding scale   SubCutaneous at bedtime  insulin lispro Injectable (HumaLOG) 3 Unit(s) SubCutaneous three times a day before meals  levothyroxine 50 MICROGram(s) Oral daily  mupirocin 2% Ointment 1 Application(s) Topical two times a day  nystatin Powder 1 Application(s) Topical two times a day  pantoprazole    Tablet 40 milliGRAM(s) Oral before breakfast  sodium chloride 0.9% lock flush 3 milliLiter(s) IV Push every 8 hours  trihexyphenidyl 2 milliGRAM(s) Oral three times a day      REVIEW OF SYSTEMS:  As per HPI, 8 out of 10 ROS were unremarkable    VITALS:  T(F): 97.9 (09-28-19 @ 20:18), Max: 97.9 (09-28-19 @ 05:10)  HR: 57 (09-28-19 @ 20:18)  BP: 138/70 (09-28-19 @ 20:18)  RR: 18 (09-28-19 @ 20:18)  SpO2: 98% (09-28-19 @ 20:18)  Wt(kg): --    09-27 @ 07:01  -  09-28 @ 07:00  --------------------------------------------------------  IN: 1280 mL / OUT: 1225 mL / NET: 55 mL    09-28 @ 07:01  -  09-28 @ 20:55  --------------------------------------------------------  IN: 720 mL / OUT: 600 mL / NET: 120 mL          PHYSICAL EXAM:  Constitutional: NAD  HEENT: PERRL  Neck: No JVD  Respiratory: CTAB, no wheezes, rales or rhonchi  Cardiovascular: S1, S2, RRR  Gastrointestinal: BS+, soft, NT/ND  Extremities: trace peripheral edema  Neurological: A/O x 3, no focal deficits  Psychiatric: Normal mood, normal affect  : No CVA tenderness. No johnson.   Skin: No rashes    LABS:      09-28    137  |  100  |  43<H>  ----------------------------<  101<H>  4.2   |  25  |  2.77<H>  09-27    136  |  98  |  42<H>  ----------------------------<  126<H>  3.8   |  25  |  2.89<H>  09-26    137  |  101  |  41<H>  ----------------------------<  192<H>  4.2   |  22  |  2.38<H>    Ca    10.0      28 Sep 2019 06:36    TPro  7.5  /  Alb  3.9  /  TBili  0.5  /  DBili  x   /  AST  27  /  ALT  5<L>  /  AlkPhos  73  09-26  TPro  7.5  /  Alb  3.6  /  TBili  0.3  /  DBili  x   /  AST  23  /  ALT  7<L>  /  AlkPhos  73  09-25                            12.3   10.1  )-----------( 237      ( 28 Sep 2019 06:36 )             38.0       Urine Studies:    Creatinine, Random Urine: 56 mg/dL (09-27 @ 12:09)  Protein/Creatinine Ratio Calculation: 1.0 Ratio (09-27 @ 12:09)  Protein, Random Urine: 116.3 mg/dL (09-22 @ 13:00)  Creatinine, Random Urine: 44.80 mg/dL (09-22 @ 13:00)

## 2019-09-28 NOTE — PROGRESS NOTE ADULT - SUBJECTIVE AND OBJECTIVE BOX
[facility]rological [facility]      Seen earlier today, and examined.  - Today, patient is without complaints.           *****MEDICATIONS: Current medication reviewed and documented.    MEDICATIONS  (STANDING):  aspirin enteric coated 81 milliGRAM(s) Oral daily  atorvastatin 80 milliGRAM(s) Oral at bedtime  carbidopa/levodopa  25/100 2 Tablet(s) Oral three times a day  docusate sodium 100 milliGRAM(s) Oral three times a day  enoxaparin Injectable 30 milliGRAM(s) SubCutaneous two times a day  furosemide    Tablet 40 milliGRAM(s) Oral daily  insulin glargine Injectable (LANTUS) 20 Unit(s) SubCutaneous at bedtime  insulin lispro (HumaLOG) corrective regimen sliding scale   SubCutaneous three times a day before meals  insulin lispro (HumaLOG) corrective regimen sliding scale   SubCutaneous at bedtime  insulin lispro Injectable (HumaLOG) 3 Unit(s) SubCutaneous three times a day before meals  levothyroxine 50 MICROGram(s) Oral daily  metoprolol tartrate 12.5 milliGRAM(s) Oral every 12 hours  mupirocin 2% Ointment 1 Application(s) Topical two times a day  nystatin Powder 1 Application(s) Topical two times a day  pantoprazole    Tablet 40 milliGRAM(s) Oral before breakfast  sodium chloride 0.9% lock flush 3 milliLiter(s) IV Push every 8 hours  trihexyphenidyl 2 milliGRAM(s) Oral three times a day    MEDICATIONS  (PRN):  artificial  tears Solution 1 Drop(s) Both EYES four times a day PRN dryness          ***** VITAL SIGNS:  T(F): 97.9 (19 @ 05:10), Max: 98.6 (19 @ 20:08)  HR: 51 (19 @ 07:45) (51 - 64)  BP: 144/84 (19 @ 05:10) (144/84 - 149/70)  RR: 18 (19 @ 05:10) (18 - 18)  SpO2: 100% (19 @ 05:10) (96% - 100%)  Wt(kg): --  ,   I&O's Summary    27 Sep 2019 07:01  -  28 Sep 2019 07:00  --------------------------------------------------------  IN: 1280 mL / OUT: 1225 mL / NET: 55 mL    28 Sep 2019 07:01  -  28 Sep 2019 14:26  --------------------------------------------------------  IN: 360 mL / OUT: 350 mL / NET: 10 mL             *****PHYSICAL EXAM: : Alert oriented x 2   Attention comprehension are fair. Able to name, repeat,  without any difficulty.   Able to follow 1-2 step commands.     EOMI fundi not visualized,  VFF to confrontration  No facial asymmetry   Tongue is midline   Palate elevates symmetrically   Moving all 4 ext symmetrically no pronator drift  left resting tremor noted   + cogwheeling     Gait : not assessed.  B/L down going toes          *****LAB AND IMAGIN.3   10.1  )-----------( 237      ( 28 Sep 2019 06:36 )             38.0               09-    137  |  100  |  43<H>  ----------------------------<  101<H>  4.2   |  25  |  2.77<H>    Ca    10.0      28 Sep 2019 06:36                           [All pertinent recent Imaging/Reports reviewed]           *****A S S E S S M E N T   A N D   P L A N :        70 yo M PMHx CAD s/p stent x 1 (), T2DM, HTN, Parkinson's presenting with c/o 10/10 chest pain described as burning radiating from abdomen to midsternum x 1 day. Patient reports symptoms started at 2 pm and resolved after 1 hour. Chest pain began after he ate lunch and was walking up a flight of stairs. He reports chest pain was associated with dyspnea, diaphoresis, and nausea. At baseline, he ambulates with cane/walker. Takes medications daily including aspirin and prasugrel. On ROS, reports chronic left leg swelling x 1-2 years.        Problem/Recommendations 1: Dizziness of unclear etiology   s/p cardiac cath recommending cabg, awaiting transfer to ns      Problem/Recommendations 2: Parkinson's   continue current regime  Thank you for allowing me to participate in the care of this patient. Please do not hesitate to call me if you have any  questions.        ________________  Lily Fang MD  [facility] Neurological [facility] ([facility]  [phone]      33 minutes spent on total encounter; more than 50 % of the visit was  spent counseling about plan of care, compliance to diet/exercise and medication regimen and or  coordinating care by the attending physician.      It is advised that stroke patients follow up with ZACH Albarran @ [phone] in 1- 2 weeks.   Others please follow up with Dr. Michael Nissenbaum [phone]

## 2019-09-28 NOTE — PROGRESS NOTE ADULT - SUBJECTIVE AND OBJECTIVE BOX
[facility] Neurological Care [facility]      Seen earlier today, and examined.  - Today, patient is without complaints.           *****MEDICATIONS: Current medication reviewed and documented.    MEDICATIONS  (STANDING):  aspirin enteric coated 81 milliGRAM(s) Oral daily  atorvastatin 80 milliGRAM(s) Oral at bedtime  carbidopa/levodopa  25/100 2 Tablet(s) Oral three times a day  docusate sodium 100 milliGRAM(s) Oral three times a day  enoxaparin Injectable 30 milliGRAM(s) SubCutaneous two times a day  furosemide    Tablet 40 milliGRAM(s) Oral daily  insulin glargine Injectable (LANTUS) 20 Unit(s) SubCutaneous at bedtime  insulin lispro (HumaLOG) corrective regimen sliding scale   SubCutaneous three times a day before meals  insulin lispro (HumaLOG) corrective regimen sliding scale   SubCutaneous at bedtime  insulin lispro Injectable (HumaLOG) 3 Unit(s) SubCutaneous three times a day before meals  levothyroxine 50 MICROGram(s) Oral daily  metoprolol tartrate 12.5 milliGRAM(s) Oral every 12 hours  mupirocin 2% Ointment 1 Application(s) Topical two times a day  nystatin Powder 1 Application(s) Topical two times a day  pantoprazole    Tablet 40 milliGRAM(s) Oral before breakfast  sodium chloride 0.9% lock flush 3 milliLiter(s) IV Push every 8 hours  trihexyphenidyl 2 milliGRAM(s) Oral three times a day    MEDICATIONS  (PRN):  artificial  tears Solution 1 Drop(s) Both EYES four times a day PRN dryness          ***** VITAL SIGNS:  T(F): 97.8 (19 @ 14:38), Max: 98.6 (19 @ 20:08)  HR: 69 (19 @ 14:38) (51 - 69)  BP: 143/72 (19 @ 14:38) (143/72 - 149/70)  RR: 18 (19 @ 14:38) (18 - 18)  SpO2: 97% (19 @ 14:38) (96% - 100%)  Wt(kg): --  ,   I&O's Summary    27 Sep 2019 07:01  -  28 Sep 2019 07:00  --------------------------------------------------------  IN: 1280 mL / OUT: 1225 mL / NET: 55 mL    28 Sep 2019 07:01  -  28 Sep 2019 14:52  --------------------------------------------------------  IN: 720 mL / OUT: 600 mL / NET: 120 mL             *****PHYSICAL EXAM:   Alert oriented x 2   Attention comprehension are fair. Able to name, repeat,  without any difficulty.   Able to follow 1-2 step commands.     EOMI fundi not visualized,  VFF to confrontration  No facial asymmetry   Tongue is midline   Palate elevates symmetrically   Moving all 4 ext symmetrically no pronator drift  left resting tremor noted   + cogwheeling     Gait : not assessed.  B/L down going toes   Gait not assessed.            *****LAB AND IMAGIN.3   10.1  )-----------( 237      ( 28 Sep 2019 06:36 )             38.0               -    137  |  100  |  43<H>  ----------------------------<  101<H>  4.2   |  25  |  2.77<H>    Ca    10.0      28 Sep 2019 06:36             [All pertinent recent Imaging/Reports reviewed]           *****A S S E S S M E N T   A N D   P L A N :  68 yo M PMHx CAD s/p stent x 1 (), T2DM, HTN, Parkinson's presenting with c/o 10/10 chest pain described as burning radiating from abdomen to midsternum x 1 day. Patient reports symptoms started at 2 pm and resolved after 1 hour. Chest pain began after he ate lunch and was walking up a flight of stairs. He reports chest pain was associated with dyspnea, diaphoresis, and nausea. At baseline, he ambulates with cane/walker. Takes medications daily including aspirin and prasugrel. On ROS, reports chronic left leg swelling x 1-2 years.        Problem/Recommendations 1: Dizziness of unclear etiology   s/p cardiac cath recommending cabg, awaiting transfer to ns    continue asa/atorvastatin for secondary prophy  Problem/Recommendations 2: Parkinson's   continue current regime          Thank you for allowing me to participate in the care of this patient. Please do not hesitate to call me if you have any  questions.        ________________  Lily Fang MD  [facility] Neurological [facility] ([facility]  [phone]      33 minutes spent on total encounter; more than 50 % of the visit was  spent counseling about plan of care, compliance to diet/exercise and medication regimen and or  coordinating care by the attending physician.      It is advised that stroke patients follow up with ZACH Albarran @ [phone] in 1- 2 weeks.   Others please follow up with Dr. Michael Nissenbaum [phone]

## 2019-09-28 NOTE — PROGRESS NOTE ADULT - SUBJECTIVE AND OBJECTIVE BOX
VITAL SIGNS  -SR   55-62  Telemetry:      Vital Signs Last 24 Hrs  T(C): 36.6 (19 @ 14:38), Max: 37 (19 @ 20:08)  T(F): 97.8 (19 @ 14:38), Max: 98.6 (19 @ 20:08)  HR: 69 (19 @ 14:38) (51 - 69)  BP: 143/72 (19 @ 14:38) (143/72 - 149/70)  RR: 18 (19 @ 14:38) (18 - 18)  SpO2: 97% (19 @ 14:38) (96% - 100%)                   Daily     Daily Weight in k.4 (28 Sep 2019 11:32)        CAPILLARY BLOOD GLUCOSE      POCT Blood Glucose.: 191 mg/dL (28 Sep 2019 11:59)  POCT Blood Glucose.: 112 mg/dL (28 Sep 2019 08:07)  POCT Blood Glucose.: 153 mg/dL (27 Sep 2019 21:42)  POCT Blood Glucose.: 128 mg/dL (27 Sep 2019 16:50)                              PHYSICAL EXAM  S   No chest pain  no palpitations"  Neurology: alert and oriented x 3, moves all extremities with no defecits  CV :  RRR  Lungs:   CTA B/L  Abdomen: soft, nontender, nondistended, positive bowel sounds  Extremities:     plus one-two pedal edema

## 2019-09-28 NOTE — PROGRESS NOTE ADULT - ATTENDING COMMENTS
Patient seen and examined, agree with above assessment and plan as transcribed above.    - awaiting CABG    Thierry Shetty MD, [facility]  [facility]PER [phone]

## 2019-09-28 NOTE — PROGRESS NOTE ADULT - PROBLEM SELECTOR PLAN 1
Pre op Cardiac surgery work up in progress  Continue with ASA 81 mg PO daily  Continue with Lipitor 80 mg PO HS   Continue with Lovenox 30 mg SQ BID for ACS   Continue with Lasix 40 mg PO daily   Daily weights   Strict WINSTON's   Plan for CABG Wednesday 10/2 with Dr Guillory

## 2019-09-28 NOTE — PROGRESS NOTE ADULT - SUBJECTIVE AND OBJECTIVE BOX
pt seen and examined, no complaints, ROS - .          artificial  tears Solution 1 Drop(s) Both EYES four times a day PRN  aspirin enteric coated 81 milliGRAM(s) Oral daily  atorvastatin 80 milliGRAM(s) Oral at bedtime  carbidopa/levodopa  25/100 2 Tablet(s) Oral three times a day  docusate sodium 100 milliGRAM(s) Oral three times a day  enoxaparin Injectable 30 milliGRAM(s) SubCutaneous two times a day  furosemide    Tablet 40 milliGRAM(s) Oral daily  insulin glargine Injectable (LANTUS) 20 Unit(s) SubCutaneous at bedtime  insulin lispro (HumaLOG) corrective regimen sliding scale   SubCutaneous three times a day before meals  insulin lispro (HumaLOG) corrective regimen sliding scale   SubCutaneous at bedtime  insulin lispro Injectable (HumaLOG) 3 Unit(s) SubCutaneous three times a day before meals  levothyroxine 50 MICROGram(s) Oral daily  metoprolol tartrate 12.5 milliGRAM(s) Oral every 12 hours  nystatin Powder 1 Application(s) Topical two times a day  pantoprazole    Tablet 40 milliGRAM(s) Oral before breakfast  sodium chloride 0.9% lock flush 3 milliLiter(s) IV Push every 8 hours  trihexyphenidyl 2 milliGRAM(s) Oral three times a day                            11.7   10.5  )-----------( 214      ( 27 Sep 2019 06:03 )             35.8       Hemoglobin: 11.7 g/dL (09-27 @ 06:03)  Hemoglobin: 11.5 g/dL (09-26 @ 08:41)  Hemoglobin: 11.8 g/dL (09-25 @ 23:47)  Hemoglobin: 11.4 g/dL (09-25 @ 06:50)  Hemoglobin: 11.4 g/dL (09-25 @ 06:50)      09-28    137  |  100  |  43<H>  ----------------------------<  101<H>  4.2   |  25  |  2.77<H>    Ca    10.0      28 Sep 2019 06:36      Creatinine Trend: 2.77<--, 2.89<--, 2.38<--, 2.45<--, 2.44<--, 2.15<--    MARCELINA:           T(C): 36.6 (09-28-19 @ 05:10), Max: 37 (09-27-19 @ 20:08)  HR: 51 (09-28-19 @ 05:10) (51 - 64)  BP: 144/84 (09-28-19 @ 05:10) (121/67 - 149/70)  RR: 18 (09-28-19 @ 05:10) (18 - 18)  SpO2: 100% (09-28-19 @ 05:10) (96% - 100%)  Wt(kg): --    I&O's Summary    27 Sep 2019 07:01  -  28 Sep 2019 07:00  --------------------------------------------------------  IN: 1280 mL / OUT: 1225 mL / NET: 55 mL        Gen: Appears well in NAD  HEENT:  (-)icterus (-)pallor  CV: N S1 S2 RRR no m/r/g   Resp:  Clear to auscultation B/L, normal effort  GI: (+) BS Soft, NT, ND  Lymph:  + non-pitting edema bilaterally (-)obvious lymphadenopathy  Skin: Warm to touch, Normal turgor  Psych: Appropriate mood and affect    TELEMETRY: SB/SR 50-70s    < from: Transthoracic Echocardiogram (09.22.19 @ 08:18) >  CONCLUSIONS:  1. Mitral annular calcification, otherwise normal mitral  valve. Mild mitral regurgitation.  2. Increased relative wall thickness with normal left  ventricular mass index, consistent with concentric left  ventricular remodeling.  3. Mild segmental left ventricular systolic dysfunction.  The anteroseptal and apical walls appear hypokinetic.  4. Mild diastolic dysfunction (Stage I).  5. The right ventricle is not well visualized; grossly  normal right ventricular systolic function.  ------------------------------------------------------------------------  Confirmed on  9/22/2019 - 12:25:06 by Jae Junior MD, [facility],  [facility], [facility]  ------------------------------------------    < end of copied text >      ASSESSMENT/PLAN: 	  70 yo M PMHx CAD s/p stent x 1 (2010), T2DM, HTN, Parkinson's, history of meningioma, admitted with NSTEMI.     -Cardiac cath performed showing multivessel CAD  -Continue ASA/Statin  - tele stable , cont BB  -Renal f/u appreciated - trend creatinine  - CABG next week   D/W Dr Shetty

## 2019-09-28 NOTE — PROGRESS NOTE ADULT - ASSESSMENT
ASSESSMENT/PLAN:  70 yo M PMHx CAD s/p stent x 1 (2010), T2DM, HTN, Parkinson's and CKD stage 3b presents with chest pain. S/P cath with MVD-awaiting CABG      1. Renal - LCV7d-1, and the creatinine trending down.  Lasix 40 mg PO QD.  Trend BMP.  Pt was told of the risk of ABBY post op and he understands the potential for HD.      2. CVS  OR date with Dr Guillory for Wed   Off aldactone   f/u Echo    3. Pulm - LE dopplers negative for DVT

## 2019-09-28 NOTE — PROGRESS NOTE ADULT - ASSESSMENT
68 yo M PMHx CAD s/p stent x 1 (2010), T2DM, HTN, Parkinson's presenting with c/o 10/10 chest pain described as burning radiating from abdomen to midsternum x 1 day. Pt s/p cardiac cath at [facility] found to have LAD 70%, RCA 80% Cx 90% stenosis requiring transfer to [facility] CT surgery service.   Hospital Course:   9/26 Nephrology consulted, aldactone discontinued. Pre op Transthoracic Echocardiogram Mitral annular calcification, otherwise normal mitral  valve. Calcified trileaflet aortic valve with normal opening. Mild aortic regurgitation. Mild concentric left ventricular hypertrophy. Endocardium not well visualized; grossly normal left ventricular systolic function.  9/27 VVS; Pre op Cardiac Surgery work up in progress. PT evaluation to assess pre op baseline activity level.  Plan for cardiac surgery in next week Wednesday with Dr. Guillory 10/2/19.   9/28     SB   holding BB,  plus one-two pedal edema   on lasix  creat imp to 2.77

## 2019-09-29 DIAGNOSIS — I10 ESSENTIAL (PRIMARY) HYPERTENSION: ICD-10-CM

## 2019-09-29 DIAGNOSIS — E03.9 HYPOTHYROIDISM, UNSPECIFIED: ICD-10-CM

## 2019-09-29 LAB
ANION GAP SERPL CALC-SCNC: 14 MMOL/L — SIGNIFICANT CHANGE UP (ref 5–17)
BUN SERPL-MCNC: 43 MG/DL — HIGH (ref 7–23)
CALCIUM SERPL-MCNC: 9.8 MG/DL — SIGNIFICANT CHANGE UP (ref 8.4–10.5)
CHLORIDE SERPL-SCNC: 100 MMOL/L — SIGNIFICANT CHANGE UP (ref 96–108)
CO2 SERPL-SCNC: 24 MMOL/L — SIGNIFICANT CHANGE UP (ref 22–31)
CREAT SERPL-MCNC: 2.47 MG/DL — HIGH (ref 0.5–1.3)
FIBRINOGEN PPP-MCNC: 518 MG/DL — HIGH (ref 320–500)
GLUCOSE SERPL-MCNC: 146 MG/DL — HIGH (ref 70–99)
HCT VFR BLD CALC: 34.4 % — LOW (ref 39–50)
HGB BLD-MCNC: 11.9 G/DL — LOW (ref 13–17)
MCHC RBC-ENTMCNC: 32.5 PG — SIGNIFICANT CHANGE UP (ref 27–34)
MCHC RBC-ENTMCNC: 34.6 GM/DL — SIGNIFICANT CHANGE UP (ref 32–36)
MCV RBC AUTO: 93.8 FL — SIGNIFICANT CHANGE UP (ref 80–100)
PLATELET # BLD AUTO: 224 K/UL — SIGNIFICANT CHANGE UP (ref 150–400)
POTASSIUM SERPL-MCNC: 3.9 MMOL/L — SIGNIFICANT CHANGE UP (ref 3.5–5.3)
POTASSIUM SERPL-SCNC: 3.9 MMOL/L — SIGNIFICANT CHANGE UP (ref 3.5–5.3)
RBC # BLD: 3.66 M/UL — LOW (ref 4.2–5.8)
RBC # FLD: 12.3 % — SIGNIFICANT CHANGE UP (ref 10.3–14.5)
SODIUM SERPL-SCNC: 138 MMOL/L — SIGNIFICANT CHANGE UP (ref 135–145)
WBC # BLD: 10.6 K/UL — HIGH (ref 3.8–10.5)
WBC # FLD AUTO: 10.6 K/UL — HIGH (ref 3.8–10.5)

## 2019-09-29 PROCEDURE — 99231 SBSQ HOSP IP/OBS SF/LOW 25: CPT

## 2019-09-29 RX ORDER — METOPROLOL TARTRATE 50 MG
12.5 TABLET ORAL ONCE
Refills: 0 | Status: COMPLETED | OUTPATIENT
Start: 2019-09-29 | End: 2019-09-29

## 2019-09-29 RX ORDER — INSULIN LISPRO 100/ML
5 VIAL (ML) SUBCUTANEOUS
Refills: 0 | Status: DISCONTINUED | OUTPATIENT
Start: 2019-09-29 | End: 2019-10-02

## 2019-09-29 RX ADMIN — CARBIDOPA AND LEVODOPA 2 TABLET(S): 25; 100 TABLET ORAL at 21:10

## 2019-09-29 RX ADMIN — Medication 5 UNIT(S): at 17:19

## 2019-09-29 RX ADMIN — Medication 100 MILLIGRAM(S): at 05:50

## 2019-09-29 RX ADMIN — Medication 3 UNIT(S): at 12:21

## 2019-09-29 RX ADMIN — INSULIN GLARGINE 20 UNIT(S): 100 INJECTION, SOLUTION SUBCUTANEOUS at 22:19

## 2019-09-29 RX ADMIN — Medication 40 MILLIGRAM(S): at 05:47

## 2019-09-29 RX ADMIN — Medication 2 MILLIGRAM(S): at 14:54

## 2019-09-29 RX ADMIN — ENOXAPARIN SODIUM 30 MILLIGRAM(S): 100 INJECTION SUBCUTANEOUS at 17:19

## 2019-09-29 RX ADMIN — SODIUM CHLORIDE 3 MILLILITER(S): 9 INJECTION INTRAMUSCULAR; INTRAVENOUS; SUBCUTANEOUS at 17:17

## 2019-09-29 RX ADMIN — CARBIDOPA AND LEVODOPA 2 TABLET(S): 25; 100 TABLET ORAL at 05:47

## 2019-09-29 RX ADMIN — MUPIROCIN 1 APPLICATION(S): 20 OINTMENT TOPICAL at 05:48

## 2019-09-29 RX ADMIN — Medication 81 MILLIGRAM(S): at 11:13

## 2019-09-29 RX ADMIN — SODIUM CHLORIDE 3 MILLILITER(S): 9 INJECTION INTRAMUSCULAR; INTRAVENOUS; SUBCUTANEOUS at 21:12

## 2019-09-29 RX ADMIN — ENOXAPARIN SODIUM 30 MILLIGRAM(S): 100 INJECTION SUBCUTANEOUS at 05:53

## 2019-09-29 RX ADMIN — MUPIROCIN 1 APPLICATION(S): 20 OINTMENT TOPICAL at 17:19

## 2019-09-29 RX ADMIN — Medication 12.5 MILLIGRAM(S): at 19:19

## 2019-09-29 RX ADMIN — SODIUM CHLORIDE 3 MILLILITER(S): 9 INJECTION INTRAMUSCULAR; INTRAVENOUS; SUBCUTANEOUS at 05:56

## 2019-09-29 RX ADMIN — Medication 100 MILLIGRAM(S): at 14:55

## 2019-09-29 RX ADMIN — Medication 2 MILLIGRAM(S): at 05:47

## 2019-09-29 RX ADMIN — Medication 100 MILLIGRAM(S): at 21:10

## 2019-09-29 RX ADMIN — Medication 2 MILLIGRAM(S): at 21:10

## 2019-09-29 RX ADMIN — CARBIDOPA AND LEVODOPA 2 TABLET(S): 25; 100 TABLET ORAL at 14:55

## 2019-09-29 RX ADMIN — Medication 3 UNIT(S): at 08:10

## 2019-09-29 RX ADMIN — PANTOPRAZOLE SODIUM 40 MILLIGRAM(S): 20 TABLET, DELAYED RELEASE ORAL at 05:47

## 2019-09-29 RX ADMIN — Medication 50 MICROGRAM(S): at 05:47

## 2019-09-29 RX ADMIN — ATORVASTATIN CALCIUM 80 MILLIGRAM(S): 80 TABLET, FILM COATED ORAL at 21:10

## 2019-09-29 NOTE — PROGRESS NOTE ADULT - SUBJECTIVE AND OBJECTIVE BOX
pt seen and examined, no complaints, ROS - .    no events overnight          artificial  tears Solution 1 Drop(s) Both EYES four times a day PRN  aspirin enteric coated 81 milliGRAM(s) Oral daily  atorvastatin 80 milliGRAM(s) Oral at bedtime  carbidopa/levodopa  25/100 2 Tablet(s) Oral three times a day  docusate sodium 100 milliGRAM(s) Oral three times a day  enoxaparin Injectable 30 milliGRAM(s) SubCutaneous two times a day  furosemide    Tablet 40 milliGRAM(s) Oral daily  insulin glargine Injectable (LANTUS) 20 Unit(s) SubCutaneous at bedtime  insulin lispro (HumaLOG) corrective regimen sliding scale   SubCutaneous three times a day before meals  insulin lispro (HumaLOG) corrective regimen sliding scale   SubCutaneous at bedtime  insulin lispro Injectable (HumaLOG) 3 Unit(s) SubCutaneous three times a day before meals  levothyroxine 50 MICROGram(s) Oral daily  mupirocin 2% Ointment 1 Application(s) Topical two times a day  nystatin Powder 1 Application(s) Topical two times a day  pantoprazole    Tablet 40 milliGRAM(s) Oral before breakfast  sodium chloride 0.9% lock flush 3 milliLiter(s) IV Push every 8 hours  trihexyphenidyl 2 milliGRAM(s) Oral three times a day                            11.9   10.6  )-----------( 224      ( 29 Sep 2019 06:34 )             34.4       Hemoglobin: 11.9 g/dL (09-29 @ 06:34)  Hemoglobin: 12.3 g/dL (09-28 @ 06:36)  Hemoglobin: 11.7 g/dL (09-27 @ 06:03)  Hemoglobin: 11.5 g/dL (09-26 @ 08:41)  Hemoglobin: 11.8 g/dL (09-25 @ 23:47)      09-28    137  |  100  |  43<H>  ----------------------------<  101<H>  4.2   |  25  |  2.77<H>    Ca    10.0      28 Sep 2019 06:36      Creatinine Trend: 2.77<--, 2.89<--, 2.38<--, 2.45<--, 2.44<--, 2.15<--    COAGS:           T(C): 36.6 (09-29-19 @ 05:43), Max: 36.6 (09-28-19 @ 14:38)  HR: 63 (09-29-19 @ 05:43) (51 - 69)  BP: 142/76 (09-29-19 @ 05:43) (138/70 - 143/72)  RR: 18 (09-29-19 @ 05:43) (18 - 18)  SpO2: 96% (09-29-19 @ 05:43) (96% - 98%)  Wt(kg): --    I&O's Summary    27 Sep 2019 07:01  -  28 Sep 2019 07:00  --------------------------------------------------------  IN: 1280 mL / OUT: 1225 mL / NET: 55 mL    28 Sep 2019 07:01  -  29 Sep 2019 06:55  --------------------------------------------------------  IN: 920 mL / OUT: 1000 mL / NET: -80 mL        Gen: Appears well in NAD  HEENT:  (-)icterus (-)pallor  CV: N S1 S2 RRR no m/r/g   Resp:  Clear to auscultation B/L, normal effort  GI: (+) BS Soft, NT, ND  Lymph:  + non-pitting edema bilaterally (-)obvious lymphadenopathy  Skin: Warm to touch, Normal turgor  Psych: Appropriate mood and affect    TELEMETRY: SB/SR      < from: Transthoracic Echocardiogram (09.22.19 @ 08:18) >  CONCLUSIONS:  1. Mitral annular calcification, otherwise normal mitral  valve. Mild mitral regurgitation.  2. Increased relative wall thickness with normal left  ventricular mass index, consistent with concentric left  ventricular remodeling.  3. Mild segmental left ventricular systolic dysfunction.  The anteroseptal and apical walls appear hypokinetic.  4. Mild diastolic dysfunction (Stage I).  5. The right ventricle is not well visualized; grossly  normal right ventricular systolic function.  ------------------------------------------------------------------------  Confirmed on  9/22/2019 - 12:25:06 by Jae Junior MD, [facility],  Counts include 234 beds at the [facility], [place]  ------------------------------------------    < end of copied text >      ASSESSMENT/PLAN: 	  70 yo M PMHx CAD s/p stent x 1 (2010), T2DM, HTN, Parkinson's, history of meningioma, admitted with NSTEMI.     -Cardiac cath performed showing multivessel CAD  - creatine stable, renal follow up   -Continue ASA/Statin  - tele stable , cont BB  - CABG next week   D/W Dr Shetty pt seen and examined, no complaints, ROS - .    no events overnight          artificial  tears Solution 1 Drop(s) Both EYES four times a day PRN  aspirin enteric coated 81 milliGRAM(s) Oral daily  atorvastatin 80 milliGRAM(s) Oral at bedtime  carbidopa/levodopa  25/100 2 Tablet(s) Oral three times a day  docusate sodium 100 milliGRAM(s) Oral three times a day  enoxaparin Injectable 30 milliGRAM(s) SubCutaneous two times a day  furosemide    Tablet 40 milliGRAM(s) Oral daily  insulin glargine Injectable (LANTUS) 20 Unit(s) SubCutaneous at bedtime  insulin lispro (HumaLOG) corrective regimen sliding scale   SubCutaneous three times a day before meals  insulin lispro (HumaLOG) corrective regimen sliding scale   SubCutaneous at bedtime  insulin lispro Injectable (HumaLOG) 3 Unit(s) SubCutaneous three times a day before meals  levothyroxine 50 MICROGram(s) Oral daily  mupirocin 2% Ointment 1 Application(s) Topical two times a day  nystatin Powder 1 Application(s) Topical two times a day  pantoprazole    Tablet 40 milliGRAM(s) Oral before breakfast  sodium chloride 0.9% lock flush 3 milliLiter(s) IV Push every 8 hours  trihexyphenidyl 2 milliGRAM(s) Oral three times a day                            11.9   10.6  )-----------( 224      ( 29 Sep 2019 06:34 )             34.4       Hemoglobin: 11.9 g/dL (09-29 @ 06:34)  Hemoglobin: 12.3 g/dL (09-28 @ 06:36)  Hemoglobin: 11.7 g/dL (09-27 @ 06:03)  Hemoglobin: 11.5 g/dL (09-26 @ 08:41)  Hemoglobin: 11.8 g/dL (09-25 @ 23:47)      09-28    137  |  100  |  43<H>  ----------------------------<  101<H>  4.2   |  25  |  2.77<H>    Ca    10.0      28 Sep 2019 06:36      Creatinine Trend: 2.77<--, 2.89<--, 2.38<--, 2.45<--, 2.44<--, 2.15<--    COAGS:           T(C): 36.6 (09-29-19 @ 05:43), Max: 36.6 (09-28-19 @ 14:38)  HR: 63 (09-29-19 @ 05:43) (51 - 69)  BP: 142/76 (09-29-19 @ 05:43) (138/70 - 143/72)  RR: 18 (09-29-19 @ 05:43) (18 - 18)  SpO2: 96% (09-29-19 @ 05:43) (96% - 98%)  Wt(kg): --    I&O's Summary    27 Sep 2019 07:01  -  28 Sep 2019 07:00  --------------------------------------------------------  IN: 1280 mL / OUT: 1225 mL / NET: 55 mL    28 Sep 2019 07:01  -  29 Sep 2019 06:55  --------------------------------------------------------  IN: 920 mL / OUT: 1000 mL / NET: -80 mL        Gen: Appears well in NAD  HEENT:  (-)icterus (-)pallor  CV: N S1 S2 RRR no m/r/g   Resp:  Clear to auscultation B/L, normal effort  GI: (+) BS Soft, NT, ND  Lymph:  + non-pitting edema bilaterally (-)obvious lymphadenopathy  Skin: Warm to touch, Normal turgor  Psych: Appropriate mood and affect    TELEMETRY: SB/SR      < from: Transthoracic Echocardiogram (09.22.19 @ 08:18) >  CONCLUSIONS:  1. Mitral annular calcification, otherwise normal mitral  valve. Mild mitral regurgitation.  2. Increased relative wall thickness with normal left  ventricular mass index, consistent with concentric left  ventricular remodeling.  3. Mild segmental left ventricular systolic dysfunction.  The anteroseptal and apical walls appear hypokinetic.  4. Mild diastolic dysfunction (Stage I).  5. The right ventricle is not well visualized; grossly  normal right ventricular systolic function.  ------------------------------------------------------------------------  Confirmed on  9/22/2019 - 12:25:06 by Jae Junior MD, [facility],  [facility], [facility]  ------------------------------------------    < end of copied text >      ASSESSMENT/PLAN: 	  70 yo M PMHx CAD s/p stent x 1 (2010), T2DM, HTN, Parkinson's, history of meningioma, admitted with NSTEMI.     -Cardiac cath performed showing multivessel CAD  - creatine stable, renal follow up   -Continue ASA/Statin  - tele stable , cont BB  - awaiting CABG   D/W Dr Shetty

## 2019-09-29 NOTE — CONSULT NOTE ADULT - PROBLEM SELECTOR RECOMMENDATION 9
Will continue current insulin regimen for now; Will continue monitoring FS, log, and FU.  Patient counseled for compliance with consistent low carb diet. Will continue Lantus 20u at bedtime, increase Humalog to 5u before each meal, and continue coverage scale. Will continue monitoring FS, log, and FU.  Patient counseled for compliance with consistent low carb diet.

## 2019-09-29 NOTE — PROGRESS NOTE ADULT - ASSESSMENT
68 yo M PMHx CAD s/p stent x 1 (2010), T2DM, HTN, Parkinson's presenting with c/o 10/10 chest pain described as burning radiating from abdomen to midsternum x 1 day. Pt s/p cardiac cath at [facility] found to have LAD 70%, RCA 80% Cx 90% stenosis requiring transfer to [facility] CT surgery service.   Hospital Course:   9/26 Nephrology consulted, aldactone discontinued. Pre op Transthoracic Echocardiogram Mitral annular calcification, otherwise normal mitral  valve. Calcified trileaflet aortic valve with normal opening. Mild aortic regurgitation. Mild concentric left ventricular hypertrophy. Endocardium not well visualized; grossly normal left ventricular systolic function.  9/27 VVS; Pre op Cardiac Surgery work up in progress. PT evaluation to assess pre op baseline activity level.  Plan for cardiac surgery in next week Wednesday with Dr. Guillory 10/2/19.   9/28     SB   holding BB,  plus one-two pedal edema   on lasix  creat imp to 2.77

## 2019-09-29 NOTE — CONSULT NOTE ADULT - ASSESSMENT
Assessment  DMT2: 69y Male with DM T2 with hyperglycemia, A1C 7.2%, was on oral meds and insulin at home, now on insulin, blood sugars trending within acceptable range, no hypoglycemic episode, eating meals, ambulating, appears comfortable.  Hypothyroidism: Patient has no hx thyroid dz, was not on any thyroid supplements, he denies neck swelling, neck pain, no hx neck surgery. Found incidentally with TSH 6.22 and was started on synthroid 50mcg PO daily.  CAD: CABG scheduled 10/2, now undergoing pre-op workup, on medications, no chest pain, stable, monitored.  HTN: Controlled,  on antihypertensive medications.  Parkinson's: on meds, stable.          Kelsie Reece MD  Cell: [phone]  Office: [phone] Assessment  DMT2: 69y Male with DM T2 with hyperglycemia, A1C 7.2%, was on oral meds and insulin at home, now on insulin, blood sugars trending within acceptable  range, no hypoglycemic episode, eating meals, ambulating, appears comfortable.  Hypothyroidism: Patient has no hx thyroid dz, was not on any thyroid supplements, he denies neck swelling, neck pain, no hx neck surgery. Found incidentally with TSH 6.22 and was started on synthroid 50mcg PO daily.  CAD: CABG scheduled 10/2, now undergoing pre-op workup, on medications, no chest pain, stable, monitored.  HTN: Controlled,  on antihypertensive medications.  Parkinson's: on meds, stable.          Kelsie Reece MD  Cell: [phone]  Office: [phone]

## 2019-09-29 NOTE — CONSULT NOTE ADULT - PROBLEM SELECTOR RECOMMENDATION 3
CABG scheduled 10/2. On medications,  no chest pain, stable, monitored and followed up by primary cardiothoracic team/cardiology team.

## 2019-09-29 NOTE — CONSULT NOTE ADULT - SUBJECTIVE AND OBJECTIVE BOX
HPI:  68 yo M PMHx CAD s/p stent x 1 (2010), T2DM, HTN, Parkinson's presenting with c/o 10/10 chest pain described as burning radiating from abdomen to midsternum x 1 day. Patient reports symptoms started at 2 pm and resolved after 1 hour. Chest pain began after he ate lunch and was walking up a flight of stairs. He reports chest pain was associated with dyspnea, diaphoresis, and nausea. At baseline, he ambulates with cane/walker. Takes medications daily including aspirin and prasugrel. On ROS, reports chronic left leg swelling x 1-2 years. Pt s/p cardiac cath at [facility] found to have multivessel disease requiring transfer to [facility] CT surgery service. Pt denies any chest pain, palpitations, SOB, N/V/D, fever, syncope, changes in vision, numbness/tingling or recent travel. (25 Sep 2019 23:44)    Patient has history of diabetes, A1C 7.2%, on oral meds and insulin at home (Tresiba 22u AM, Novolog pre-meal sliding scale, glimepiride 1mg), states his sugars were "all over the place", no recent hypoglycemic episodes, no polyuria polydipsia. Patient follows up with PCP for diabetes management.      PAST MEDICAL & SURGICAL HISTORY:  Shoulder pain, left  Osteoarthritis  Panic attacks  Urinary frequency  Urinary incontinence  Nocturia: x2-3  Gastroesophageal reflux disease without esophagitis: diet controlled  Vertigo: not improved with meclizine in past  Insomnia  Autoimmune disease: started on prednisone on 12/23/15 for &quot;inflammation&quot; but does not know diagnosis  Leg swelling: left leg  CAD (coronary artery disease): s/p 1 stent in 2010  Parkinson disease  Hypercholesterolemia  Diabetes Mellitus, Type 2  Hypertension  S/P angioplasty with stent: 1 stent, RCA  Status Post Cardiac Catheterization      FAMILY HISTORY:  Family history of pulmonary fibrosis (Sibling)  Family history of malaria  Family history of hypertension      Social History:    Outpatient Medications:    MEDICATIONS  (STANDING):  aspirin enteric coated 81 milliGRAM(s) Oral daily  atorvastatin 80 milliGRAM(s) Oral at bedtime  carbidopa/levodopa  25/100 2 Tablet(s) Oral three times a day  docusate sodium 100 milliGRAM(s) Oral three times a day  enoxaparin Injectable 30 milliGRAM(s) SubCutaneous two times a day  furosemide    Tablet 40 milliGRAM(s) Oral daily  insulin glargine Injectable (LANTUS) 20 Unit(s) SubCutaneous at bedtime  insulin lispro (HumaLOG) corrective regimen sliding scale   SubCutaneous three times a day before meals  insulin lispro (HumaLOG) corrective regimen sliding scale   SubCutaneous at bedtime  insulin lispro Injectable (HumaLOG) 3 Unit(s) SubCutaneous three times a day before meals  levothyroxine 50 MICROGram(s) Oral daily  mupirocin 2% Ointment 1 Application(s) Topical two times a day  nystatin Powder 1 Application(s) Topical two times a day  pantoprazole    Tablet 40 milliGRAM(s) Oral before breakfast  sodium chloride 0.9% lock flush 3 milliLiter(s) IV Push every 8 hours  trihexyphenidyl 2 milliGRAM(s) Oral three times a day    MEDICATIONS  (PRN):  artificial  tears Solution 1 Drop(s) Both EYES four times a day PRN dryness      Allergies    adhesives (Rash)  latex (Urticaria)  No Known Drug Allergies    Intolerances      Review of Systems:  Constitutional: No fever, no chills  Eyes: No blurry vision  Neuro: No tremors  HEENT: No pain, no neck swelling  Cardiovascular: No chest pain, no palpitations  Respiratory: Has SOB, no cough  GI: No nausea, vomiting, abdominal pain  : No dysuria  Skin: no rash  MSK: Has leg swelling.  Psych: no depression  Endocrine: no polyuria, polydipsia    ALL OTHER SYSTEMS REVIEWED AND NEGATIVE    UNABLE TO OBTAIN    PHYSICAL EXAM:  VITALS: T(C): 36.6 (09-29-19 @ 05:43)  T(F): 97.9 (09-29-19 @ 05:43), Max: 97.9 (09-28-19 @ 20:18)  HR: 63 (09-29-19 @ 05:43) (57 - 69)  BP: 142/76 (09-29-19 @ 05:43) (138/70 - 143/72)  RR:  (18 - 18)  SpO2:  (96% - 98%)  Wt(kg): --  GENERAL: NAD, well-groomed, well-developed  EYES: No proptosis, no lid lag  HEENT:  Atraumatic, Normocephalic  THYROID: Normal size, no palpable nodules  RESPIRATORY: Clear to auscultation bilaterally; No rales, rhonchi, wheezing  CARDIOVASCULAR: Si S2, No murmurs;  GI: Soft, non distended, normal bowel sounds  SKIN: Dry, intact, No rashes or lesions  MUSCULOSKELETAL: Has BL lower extremity edema.  NEURO:  no tremor, sensation decreased in feet BL,    POCT Blood Glucose.: 146 mg/dL (09-29-19 @ 07:57)  POCT Blood Glucose.: 132 mg/dL (09-28-19 @ 22:21)  POCT Blood Glucose.: 160 mg/dL (09-28-19 @ 17:11)  POCT Blood Glucose.: 191 mg/dL (09-28-19 @ 11:59)  POCT Blood Glucose.: 112 mg/dL (09-28-19 @ 08:07)  POCT Blood Glucose.: 153 mg/dL (09-27-19 @ 21:42)  POCT Blood Glucose.: 128 mg/dL (09-27-19 @ 16:50)  POCT Blood Glucose.: 142 mg/dL (09-27-19 @ 12:30)  POCT Blood Glucose.: 134 mg/dL (09-27-19 @ 07:43)  POCT Blood Glucose.: 129 mg/dL (09-26-19 @ 21:54)  POCT Blood Glucose.: 122 mg/dL (09-26-19 @ 17:46)  POCT Blood Glucose.: 173 mg/dL (09-26-19 @ 13:06)                            11.9   10.6  )-----------( 224      ( 29 Sep 2019 06:34 )             34.4       09-29    138  |  100  |  43<H>  ----------------------------<  146<H>  3.9   |  24  |  2.47<H>    EGFR if : 30<L>  EGFR if non : 26<L>    Ca    9.8      09-29        Thyroid Function Tests:  09-26 @ 02:38 TSH 6.22 FreeT4 -- T3 -- Anti TPO -- Anti Thyroglobulin Ab -- TSI --  09-22 @ 05:30 TSH 4.96 FreeT4 -- T3 -- Anti TPO -- Anti Thyroglobulin Ab -- TSI --      Hemoglobin A1C, Whole Blood: 7.2 % <H> [4.0 - 5.6] (09-26-19 @ 02:38)  Hemoglobin A1C, Whole Blood: 7.2 % <H> [4.0 - 5.6] (09-26-19 @ 02:38)  Hemoglobin A1C, Whole Blood: 7.0 % <H> [4.0 - 5.6] (09-22-19 @ 05:30)      09-22 Chol 152  HDL 38 Trig 75    Radiology: HPI:  70 yo M PMHx CAD s/p stent x 1 (2010), T2DM, HTN, Parkinson's presenting with c/o 10/10 chest pain described as burning radiating from abdomen to midsternum x 1 day. Patient reports symptoms started at 2 pm and resolved after 1 hour. Chest pain began after he ate lunch and was walking up a flight of stairs. He reports chest pain was associated with dyspnea, diaphoresis, and nausea. At baseline, he ambulates with cane/walker. Takes medications daily including aspirin and prasugrel. On ROS, reports chronic left leg swelling x 1-2 years. Pt s/p cardiac cath at [facility] found to have multivessel disease requiring transfer to [facility] CT surgery service. Pt denies any chest pain, palpitations, SOB, N/V/D, fever, syncope, changes in vision, numbness/tingling or recent travel. (25 Sep 2019 23:44)    Patient has history of diabetes, A1C 7.2%,  on oral meds and insulin at home (Tresiba 22u AM, Novolog pre-meal sliding scale, glimepiride 1mg), states his sugars were "all over the place", no recent hypoglycemic episodes, no polyuria polydipsia. Patient follows up with PCP for diabetes management.      PAST MEDICAL & SURGICAL HISTORY:  Shoulder pain, left  Osteoarthritis  Panic attacks  Urinary frequency  Urinary incontinence  Nocturia: x2-3  Gastroesophageal reflux disease without esophagitis: diet controlled  Vertigo: not improved with meclizine in past  Insomnia  Autoimmune disease: started on prednisone on 12/23/15 for &quot;inflammation&quot; but does not know diagnosis  Leg swelling: left leg  CAD (coronary artery disease): s/p 1 stent in 2010  Parkinson disease  Hypercholesterolemia  Diabetes Mellitus, Type 2  Hypertension  S/P angioplasty with stent: 1 stent, RCA  Status Post Cardiac Catheterization      FAMILY HISTORY:  Family history of pulmonary fibrosis (Sibling)  Family history of malaria  Family history of hypertension      Social History:    Outpatient Medications:    MEDICATIONS  (STANDING):  aspirin enteric coated 81 milliGRAM(s) Oral daily  atorvastatin 80 milliGRAM(s) Oral at bedtime  carbidopa/levodopa  25/100 2 Tablet(s) Oral three times a day  docusate sodium 100 milliGRAM(s) Oral three times a day  enoxaparin Injectable 30 milliGRAM(s) SubCutaneous two times a day  furosemide    Tablet 40 milliGRAM(s) Oral daily  insulin glargine Injectable (LANTUS) 20 Unit(s) SubCutaneous at bedtime  insulin lispro (HumaLOG) corrective regimen sliding scale   SubCutaneous three times a day before meals  insulin lispro (HumaLOG) corrective regimen sliding scale   SubCutaneous at bedtime  insulin lispro Injectable (HumaLOG) 3 Unit(s) SubCutaneous three times a day before meals  levothyroxine 50 MICROGram(s) Oral daily  mupirocin 2% Ointment 1 Application(s) Topical two times a day  nystatin Powder 1 Application(s) Topical two times a day  pantoprazole    Tablet 40 milliGRAM(s) Oral before breakfast  sodium chloride 0.9% lock flush 3 milliLiter(s) IV Push every 8 hours  trihexyphenidyl 2 milliGRAM(s) Oral three times a day    MEDICATIONS  (PRN):  artificial  tears Solution 1 Drop(s) Both EYES four times a day PRN dryness      Allergies    adhesives (Rash)  latex (Urticaria)  No Known Drug Allergies    Intolerances      Review of Systems:  Constitutional: No fever, no chills  Eyes: No blurry vision  Neuro: No tremors  HEENT: No pain, no neck swelling  Cardiovascular: No chest pain, no palpitations  Respiratory: Has SOB, no cough  GI: No nausea, vomiting, abdominal pain  : No dysuria  Skin: no rash  MSK: Has leg swelling.  Psych: no depression  Endocrine: no polyuria, polydipsia    ALL OTHER SYSTEMS REVIEWED AND NEGATIVE    UNABLE TO OBTAIN    PHYSICAL EXAM:  VITALS: T(C): 36.6 (09-29-19 @ 05:43)  T(F): 97.9 (09-29-19 @ 05:43), Max: 97.9 (09-28-19 @ 20:18)  HR: 63 (09-29-19 @ 05:43) (57 - 69)  BP: 142/76 (09-29-19 @ 05:43) (138/70 - 143/72)  RR:  (18 - 18)  SpO2:  (96% - 98%)  Wt(kg): --  GENERAL: NAD, well-groomed, well-developed  EYES: No proptosis, no lid lag  HEENT:  Atraumatic, Normocephalic  THYROID: Normal size, no palpable nodules  RESPIRATORY: Clear to auscultation bilaterally; No rales, rhonchi, wheezing  CARDIOVASCULAR: Si S2, No murmurs;  GI: Soft, non distended, normal bowel sounds  SKIN: Dry, intact, No rashes or lesions  MUSCULOSKELETAL: Has BL lower extremity edema.  NEURO:  no tremor, sensation decreased in feet BL,    POCT Blood Glucose.: 146 mg/dL (09-29-19 @ 07:57)  POCT Blood Glucose.: 132 mg/dL (09-28-19 @ 22:21)  POCT Blood Glucose.: 160 mg/dL (09-28-19 @ 17:11)  POCT Blood Glucose.: 191 mg/dL (09-28-19 @ 11:59)  POCT Blood Glucose.: 112 mg/dL (09-28-19 @ 08:07)  POCT Blood Glucose.: 153 mg/dL (09-27-19 @ 21:42)  POCT Blood Glucose.: 128 mg/dL (09-27-19 @ 16:50)  POCT Blood Glucose.: 142 mg/dL (09-27-19 @ 12:30)  POCT Blood Glucose.: 134 mg/dL (09-27-19 @ 07:43)  POCT Blood Glucose.: 129 mg/dL (09-26-19 @ 21:54)  POCT Blood Glucose.: 122 mg/dL (09-26-19 @ 17:46)  POCT Blood Glucose.: 173 mg/dL (09-26-19 @ 13:06)                            11.9   10.6  )-----------( 224      ( 29 Sep 2019 06:34 )             34.4       09-29    138  |  100  |  43<H>  ----------------------------<  146<H>  3.9   |  24  |  2.47<H>    EGFR if : 30<L>  EGFR if non : 26<L>    Ca    9.8      09-29        Thyroid Function Tests:  09-26 @ 02:38 TSH 6.22 FreeT4 -- T3 -- Anti TPO -- Anti Thyroglobulin Ab -- TSI --  09-22 @ 05:30 TSH 4.96 FreeT4 -- T3 -- Anti TPO -- Anti Thyroglobulin Ab -- TSI --      Hemoglobin A1C, Whole Blood: 7.2 % <H> [4.0 - 5.6] (09-26-19 @ 02:38)  Hemoglobin A1C, Whole Blood: 7.2 % <H> [4.0 - 5.6] (09-26-19 @ 02:38)  Hemoglobin A1C, Whole Blood: 7.0 % <H> [4.0 - 5.6] (09-22-19 @ 05:30)      09-22 Chol 152  HDL 38 Trig 75    Radiology:

## 2019-09-29 NOTE — PROGRESS NOTE ADULT - SUBJECTIVE AND OBJECTIVE BOX
VITAL SIGNS    Telemetry:  sb-sr 60    Vital Signs Last 24 Hrs  T(C): 36.6 (19 @ 05:43), Max: 36.6 (19 @ 14:38)  T(F): 97.9 (19 @ 05:43), Max: 97.9 (19 @ 20:18)  HR: 63 (19 @ 05:43) (57 - 69)  BP: 142/76 (19 @ 05:43) (138/70 - 143/72)  RR: 18 (19 @ 05:43) (18 - 18)  SpO2: 96% (19 @ 07:00) (96% - 98%)                   Daily     Daily Weight in k.4 (28 Sep 2019 11:32)        CAPILLARY BLOOD GLUCOSE      POCT Blood Glucose.: 146 mg/dL (29 Sep 2019 07:57)  POCT Blood Glucose.: 132 mg/dL (28 Sep 2019 22:21)  POCT Blood Glucose.: 160 mg/dL (28 Sep 2019 17:11)  POCT Blood Glucose.: 191 mg/dL (28 Sep 2019 11:59)                           PHYSICAL EXAM    Neurology: alert and oriented x 3, moves all extremities with no defecits  CV :  RRR  Lungs:   CTA B/L  Abdomen: soft, nontender, nondistended, positive bowel sounds, last bowel movement   Extremities:   plus one pedal edema  no calf tenderness

## 2019-09-29 NOTE — CONSULT NOTE ADULT - ATTENDING COMMENTS
Will continue monitoring FS, log, and FU.  Patient counseled for compliance with consistent low carb diet.

## 2019-09-29 NOTE — PROGRESS NOTE ADULT - ATTENDING COMMENTS
Agree with above assessment and plan as outlined above.    - awaiting CABG    Thierry Shetty MD, [facility]  BEEPER [phone]

## 2019-09-30 LAB
ANION GAP SERPL CALC-SCNC: 13 MMOL/L — SIGNIFICANT CHANGE UP (ref 5–17)
BLD GP AB SCN SERPL QL: NEGATIVE — SIGNIFICANT CHANGE UP
BUN SERPL-MCNC: 43 MG/DL — HIGH (ref 7–23)
CALCIUM SERPL-MCNC: 9.8 MG/DL — SIGNIFICANT CHANGE UP (ref 8.4–10.5)
CHLORIDE SERPL-SCNC: 101 MMOL/L — SIGNIFICANT CHANGE UP (ref 96–108)
CO2 SERPL-SCNC: 23 MMOL/L — SIGNIFICANT CHANGE UP (ref 22–31)
CREAT SERPL-MCNC: 2.34 MG/DL — HIGH (ref 0.5–1.3)
GLUCOSE SERPL-MCNC: 121 MG/DL — HIGH (ref 70–99)
HCT VFR BLD CALC: 35.9 % — LOW (ref 39–50)
HGB BLD-MCNC: 12.3 G/DL — LOW (ref 13–17)
MCHC RBC-ENTMCNC: 32.1 PG — SIGNIFICANT CHANGE UP (ref 27–34)
MCHC RBC-ENTMCNC: 34.1 GM/DL — SIGNIFICANT CHANGE UP (ref 32–36)
MCV RBC AUTO: 94 FL — SIGNIFICANT CHANGE UP (ref 80–100)
PLATELET # BLD AUTO: 209 K/UL — SIGNIFICANT CHANGE UP (ref 150–400)
POTASSIUM SERPL-MCNC: 4 MMOL/L — SIGNIFICANT CHANGE UP (ref 3.5–5.3)
POTASSIUM SERPL-SCNC: 4 MMOL/L — SIGNIFICANT CHANGE UP (ref 3.5–5.3)
RBC # BLD: 3.82 M/UL — LOW (ref 4.2–5.8)
RBC # FLD: 12.4 % — SIGNIFICANT CHANGE UP (ref 10.3–14.5)
RH IG SCN BLD-IMP: POSITIVE — SIGNIFICANT CHANGE UP
SODIUM SERPL-SCNC: 137 MMOL/L — SIGNIFICANT CHANGE UP (ref 135–145)
T4 FREE SERPL-MCNC: 1.3 NG/DL — SIGNIFICANT CHANGE UP (ref 0.9–1.8)
WBC # BLD: 9.8 K/UL — SIGNIFICANT CHANGE UP (ref 3.8–10.5)
WBC # FLD AUTO: 9.8 K/UL — SIGNIFICANT CHANGE UP (ref 3.8–10.5)

## 2019-09-30 PROCEDURE — 99231 SBSQ HOSP IP/OBS SF/LOW 25: CPT

## 2019-09-30 RX ORDER — SIMETHICONE 80 MG/1
80 TABLET, CHEWABLE ORAL
Refills: 0 | Status: DISCONTINUED | OUTPATIENT
Start: 2019-09-30 | End: 2019-10-02

## 2019-09-30 RX ORDER — METOPROLOL TARTRATE 50 MG
12.5 TABLET ORAL EVERY 12 HOURS
Refills: 0 | Status: DISCONTINUED | OUTPATIENT
Start: 2019-09-30 | End: 2019-10-02

## 2019-09-30 RX ORDER — ENOXAPARIN SODIUM 100 MG/ML
30 INJECTION SUBCUTANEOUS DAILY
Refills: 0 | Status: DISCONTINUED | OUTPATIENT
Start: 2019-09-30 | End: 2019-10-01

## 2019-09-30 RX ADMIN — ATORVASTATIN CALCIUM 80 MILLIGRAM(S): 80 TABLET, FILM COATED ORAL at 21:32

## 2019-09-30 RX ADMIN — Medication 2 MILLIGRAM(S): at 05:09

## 2019-09-30 RX ADMIN — CARBIDOPA AND LEVODOPA 2 TABLET(S): 25; 100 TABLET ORAL at 05:09

## 2019-09-30 RX ADMIN — SIMETHICONE 80 MILLIGRAM(S): 80 TABLET, CHEWABLE ORAL at 00:44

## 2019-09-30 RX ADMIN — Medication 50 MICROGRAM(S): at 05:09

## 2019-09-30 RX ADMIN — Medication 5 UNIT(S): at 12:32

## 2019-09-30 RX ADMIN — Medication 100 MILLIGRAM(S): at 21:32

## 2019-09-30 RX ADMIN — Medication 100 MILLIGRAM(S): at 13:17

## 2019-09-30 RX ADMIN — SODIUM CHLORIDE 3 MILLILITER(S): 9 INJECTION INTRAMUSCULAR; INTRAVENOUS; SUBCUTANEOUS at 13:13

## 2019-09-30 RX ADMIN — Medication 2 MILLIGRAM(S): at 21:32

## 2019-09-30 RX ADMIN — MUPIROCIN 1 APPLICATION(S): 20 OINTMENT TOPICAL at 17:31

## 2019-09-30 RX ADMIN — Medication 12.5 MILLIGRAM(S): at 21:32

## 2019-09-30 RX ADMIN — ENOXAPARIN SODIUM 30 MILLIGRAM(S): 100 INJECTION SUBCUTANEOUS at 17:31

## 2019-09-30 RX ADMIN — PANTOPRAZOLE SODIUM 40 MILLIGRAM(S): 20 TABLET, DELAYED RELEASE ORAL at 05:10

## 2019-09-30 RX ADMIN — Medication 5 UNIT(S): at 17:31

## 2019-09-30 RX ADMIN — Medication 12.5 MILLIGRAM(S): at 09:14

## 2019-09-30 RX ADMIN — Medication 81 MILLIGRAM(S): at 13:17

## 2019-09-30 RX ADMIN — Medication 5 UNIT(S): at 09:13

## 2019-09-30 RX ADMIN — CARBIDOPA AND LEVODOPA 2 TABLET(S): 25; 100 TABLET ORAL at 21:32

## 2019-09-30 RX ADMIN — ENOXAPARIN SODIUM 30 MILLIGRAM(S): 100 INJECTION SUBCUTANEOUS at 05:09

## 2019-09-30 RX ADMIN — MUPIROCIN 1 APPLICATION(S): 20 OINTMENT TOPICAL at 05:11

## 2019-09-30 RX ADMIN — Medication 100 MILLIGRAM(S): at 05:09

## 2019-09-30 RX ADMIN — SODIUM CHLORIDE 3 MILLILITER(S): 9 INJECTION INTRAMUSCULAR; INTRAVENOUS; SUBCUTANEOUS at 05:13

## 2019-09-30 RX ADMIN — SODIUM CHLORIDE 3 MILLILITER(S): 9 INJECTION INTRAMUSCULAR; INTRAVENOUS; SUBCUTANEOUS at 21:39

## 2019-09-30 RX ADMIN — CARBIDOPA AND LEVODOPA 2 TABLET(S): 25; 100 TABLET ORAL at 13:17

## 2019-09-30 RX ADMIN — Medication 40 MILLIGRAM(S): at 05:09

## 2019-09-30 RX ADMIN — INSULIN GLARGINE 20 UNIT(S): 100 INJECTION, SOLUTION SUBCUTANEOUS at 21:33

## 2019-09-30 RX ADMIN — Medication 2 MILLIGRAM(S): at 13:17

## 2019-09-30 NOTE — PROGRESS NOTE ADULT - SUBJECTIVE AND OBJECTIVE BOX
[facility]      Seen earlier today, and examined.  - Today, patient is without complaints.           *****MEDICATIONS: Current medication reviewed and documented.    MEDICATIONS  (STANDING):  aspirin enteric coated 81 milliGRAM(s) Oral daily  atorvastatin 80 milliGRAM(s) Oral at bedtime  carbidopa/levodopa  25/100 2 Tablet(s) Oral three times a day  docusate sodium 100 milliGRAM(s) Oral three times a day  enoxaparin Injectable 30 milliGRAM(s) SubCutaneous daily  furosemide    Tablet 40 milliGRAM(s) Oral daily  insulin glargine Injectable (LANTUS) 20 Unit(s) SubCutaneous at bedtime  insulin lispro (HumaLOG) corrective regimen sliding scale   SubCutaneous three times a day before meals  insulin lispro (HumaLOG) corrective regimen sliding scale   SubCutaneous at bedtime  insulin lispro Injectable (HumaLOG) 5 Unit(s) SubCutaneous three times a day with meals  levothyroxine 50 MICROGram(s) Oral daily  metoprolol tartrate 12.5 milliGRAM(s) Oral every 12 hours  mupirocin 2% Ointment 1 Application(s) Topical two times a day  nystatin Powder 1 Application(s) Topical two times a day  pantoprazole    Tablet 40 milliGRAM(s) Oral before breakfast  sodium chloride 0.9% lock flush 3 milliLiter(s) IV Push every 8 hours  trihexyphenidyl 2 milliGRAM(s) Oral three times a day    MEDICATIONS  (PRN):  artificial  tears Solution 1 Drop(s) Both EYES four times a day PRN dryness  simethicone 80 milliGRAM(s) Chew four times a day PRN Upset Stomach          ***** VITAL SIGNS:  T(F): 98 (19 @ 13:18), Max: 98.3 (19 @ 20:23)  HR: 58 (19 @ 13:18) (58 - 67)  BP: 151/75 (19 @ 13:18) (140/75 - 161/77)  RR: 18 (19 @ 13:18) (17 - 18)  SpO2: 99% (19 @ 13:18) (97% - 99%)  Wt(kg): --  ,   I&O's Summary    29 Sep 2019 07:01  -  30 Sep 2019 07:00  --------------------------------------------------------  IN: 1200 mL / OUT: 950 mL / NET: 250 mL    30 Sep 2019 07:01  -  30 Sep 2019 16:21  --------------------------------------------------------  IN: 600 mL / OUT: 750 mL / NET: -150 mL             *****PHYSICAL EXAM:   Alert oriented x 2   Attention comprehension are fair. Able to name, repeat,  without any difficulty.   Able to follow 1-2 step commands.     EOMI fundi not visualized,  VFF to confrontration  No facial asymmetry   Tongue is midline   Palate elevates symmetrically   Moving all 4 ext symmetrically no pronator drift  left resting tremor noted   + cogwheeling     Gait : not assessed.  B/L down going toes   Gait not assessed.     Gait not assessed.            *****LAB AND IMAGIN.3   9.8   )-----------( 209      ( 30 Sep 2019 05:55 )             35.9               -    137  |  101  |  43<H>  ----------------------------<  121<H>  4.0   |  23  |  2.34<H>    Ca    9.8      30 Sep 2019 05:55                           [All pertinent recent Imaging/Reports reviewed]           *****A S S E S S M E N T   A N D   P L A N :    70 yo M PMHx CAD s/p stent x 1 (), T2DM, HTN, Parkinson's presenting with c/o 10/10 chest pain described as burning radiating from abdomen to midsternum x 1 day. Patient reports symptoms started at 2 pm and resolved after 1 hour. Chest pain began after he ate lunch and was walking up a flight of stairs. He reports chest pain was associated with dyspnea, diaphoresis, and nausea. At baseline, he ambulates with cane/walker. Takes medications daily including aspirin and prasugrel. On ROS, reports chronic left leg swelling x 1-2 years.        Problem/Recommendations 1: Dizziness of unclear etiology   s/p cardiac cath recommending cabg, awaiting transfer to ns    continue asa/atorvastatin for secondary prophy  Problem/Recommendations 2: Parkinson's stable exam  continue current regime      Thank you for allowing me to participate in the care of this patient. Please do not hesitate to call me if you have any  questions.        ________________  Lily Fang MD  [facility] ([facility]  [phone]      33 minutes spent on total encounter; more than 50 % of the visit was  spent counseling about plan of care, compliance to diet/exercise and medication regimen and or  coordinating care by the attending physician.      It is advised that stroke patients follow up with ZACH Albarran @ [phone] in 1- 2 weeks.   Others please follow up with Dr. Michael Nissenbaum [phone]

## 2019-09-30 NOTE — PROGRESS NOTE ADULT - ASSESSMENT
Assessment  DMT2: 69y Male with DM T2 with hyperglycemia, A1C 7.2%, was on oral meds and insulin at home, now on insulin, FS improving, no hypoglycemic episode, eating meals, ambulating, appears comfortable.  Hypothyroidism: Patient has no hx thyroid dz, was not on any thyroid supplements, he denies neck swelling, neck pain, no hx neck surgery. Found incidentally with TSH 6.22 and was started on synthroid 50mcg PO daily.  CAD: CABG scheduled 10/2, now undergoing pre-op workup, on medications, no chest pain, stable, monitored.  HTN: Controlled,  on antihypertensive medications.  Parkinson's: on meds, stable.          Kelsie Reece MD  Cell: [phone]  Office: [phone]

## 2019-09-30 NOTE — PROGRESS NOTE ADULT - SUBJECTIVE AND OBJECTIVE BOX
VITAL SIGNS    Telemetry:      Vital Signs Last 24 Hrs  T(C): 36.3 (19 @ 05:11), Max: 36.8 (19 @ 20:23)  T(F): 97.3 (19 @ 05:11), Max: 98.3 (19 @ 20:23)  HR: 61 (19 @ 05:11) (60 - 70)  BP: 161/77 (19 @ 05:11) (140/75 - 163/74)  RR: 18 (19 @ 05:11) (17 - 18)  SpO2: 97% (19 @ 05:11) (97% - 98%)                   Daily     Daily Weight in k.1 (30 Sep 2019 10:36)        CAPILLARY BLOOD GLUCOSE      POCT Blood Glucose.: 135 mg/dL (30 Sep 2019 11:33)  POCT Blood Glucose.: 128 mg/dL (30 Sep 2019 08:00)  POCT Blood Glucose.: 139 mg/dL (29 Sep 2019 22:18)  POCT Blood Glucose.: 141 mg/dL (29 Sep 2019 16:58)  POCT Blood Glucose.: 142 mg/dL (29 Sep 2019 12:10)          Drains:     MS         [  ] Drainage:                 L Pleural  [  ]  Drainage:                R Pleural  [  ]  Drainage:    Pacing Wires        [  ]   Settings:                                  Isolated  [  ]    Coumadin    [ ] YES          [  ]      NO         Reason:                         PHYSICAL EXAM    Neurology: alert and oriented x 3, moves all extremities with no defecits  CV :  RRR  Sternal Wound :  CDI , Stable  Lungs:   CTA B/L  Abdomen: soft, nontender, nondistended, positive bowel sounds, last bowel movement   Extremities: VITAL SIGNS    Telemetry:      sr 50-70  Vital Signs Last 24 Hrs  T(C): 36.3 (19 @ 05:11), Max: 36.8 (19 @ 20:23)  T(F): 97.3 (19 @ 05:11), Max: 98.3 (19 @ 20:23)  HR: 61 (19 @ 05:11) (60 - 70)  BP: 161/77 (19 @ 05:11) (140/75 - 163/74)  RR: 18 (19 @ 05:11) (17 - 18)  SpO2: 97% (19 @ 05:11) (97% - 98%)                   Daily     Daily Weight in k.1 (30 Sep 2019 10:36)        CAPILLARY BLOOD GLUCOSE      POCT Blood Glucose.: 135 mg/dL (30 Sep 2019 11:33)  POCT Blood Glucose.: 128 mg/dL (30 Sep 2019 08:00)  POCT Blood Glucose.: 139 mg/dL (29 Sep 2019 22:18)  POCT Blood Glucose.: 141 mg/dL (29 Sep 2019 16:58)  POCT Blood Glucose.: 142 mg/dL (29 Sep 2019 12:10)                           PHYSICAL EXAM  S   No chest pain  no palpitations"  Neurology: alert and oriented x 3, moves all extremities with no defecits  CV :  RRR  Lungs:   CTA B/L  Abdomen: soft, nontender, nondistended, positive bowel sounds,  Extremities:     trace to plus one pedal edema   no calf tenderness

## 2019-09-30 NOTE — PROGRESS NOTE ADULT - SUBJECTIVE AND OBJECTIVE BOX
Chief complaint  Patient is a 69y old  Male who presents with a chief complaint of Multivessel disease (30 Sep 2019 14:18)   Review of systems  Patient in bed, looks comfortable, no fever, no hypoglycemia.    Labs and Fingersticks  CAPILLARY BLOOD GLUCOSE      POCT Blood Glucose.: 135 mg/dL (30 Sep 2019 11:33)  POCT Blood Glucose.: 128 mg/dL (30 Sep 2019 08:00)  POCT Blood Glucose.: 139 mg/dL (29 Sep 2019 22:18)  POCT Blood Glucose.: 141 mg/dL (29 Sep 2019 16:58)      Anion Gap, Serum: 13 (09-30 @ 05:55)  Anion Gap, Serum: 14 (09-29 @ 06:34)      Calcium, Total Serum: 9.8 (09-30 @ 05:55)  Calcium, Total Serum: 9.8 (09-29 @ 06:34)          09-30    137  |  101  |  43<H>  ----------------------------<  121<H>  4.0   |  23  |  2.34<H>    Ca    9.8      30 Sep 2019 05:55                          12.3   9.8   )-----------( 209      ( 30 Sep 2019 05:55 )             35.9     Medications  MEDICATIONS  (STANDING):  aspirin enteric coated 81 milliGRAM(s) Oral daily  atorvastatin 80 milliGRAM(s) Oral at bedtime  carbidopa/levodopa  25/100 2 Tablet(s) Oral three times a day  docusate sodium 100 milliGRAM(s) Oral three times a day  enoxaparin Injectable 30 milliGRAM(s) SubCutaneous daily  furosemide    Tablet 40 milliGRAM(s) Oral daily  insulin glargine Injectable (LANTUS) 20 Unit(s) SubCutaneous at bedtime  insulin lispro (HumaLOG) corrective regimen sliding scale   SubCutaneous three times a day before meals  insulin lispro (HumaLOG) corrective regimen sliding scale   SubCutaneous at bedtime  insulin lispro Injectable (HumaLOG) 5 Unit(s) SubCutaneous three times a day with meals  levothyroxine 50 MICROGram(s) Oral daily  metoprolol tartrate 12.5 milliGRAM(s) Oral every 12 hours  mupirocin 2% Ointment 1 Application(s) Topical two times a day  nystatin Powder 1 Application(s) Topical two times a day  pantoprazole    Tablet 40 milliGRAM(s) Oral before breakfast  sodium chloride 0.9% lock flush 3 milliLiter(s) IV Push every 8 hours  trihexyphenidyl 2 milliGRAM(s) Oral three times a day      Physical Exam  General: Patient comfortable in bed  Vital Signs Last 12 Hrs  T(F): 98 (09-30-19 @ 13:18), Max: 98 (09-30-19 @ 13:18)  HR: 58 (09-30-19 @ 13:18) (58 - 61)  BP: 151/75 (09-30-19 @ 13:18) (151/75 - 161/77)  BP(mean): --  RR: 18 (09-30-19 @ 13:18) (18 - 18)  SpO2: 99% (09-30-19 @ 13:18) (97% - 99%)  Neck: No palpable thyroid nodules.  CVS: S1S2, No murmurs  Respiratory: No wheezing, no crepitations  GI: Abdomen soft, bowel sounds positive  Musculoskeletal:  edema lower extremities.   Skin: No skin rashes, no ecchymosis    Diagnostics

## 2019-09-30 NOTE — PROGRESS NOTE ADULT - PROBLEM SELECTOR PLAN 1
Pre op Cardiac surgery work up in progress  Continue with ASA 81 mg PO daily  Continue with Lipitor 80 mg PO HS   Continue with Lovenox 30 mg SQ BID for ACS last dose tues night  Continue with Lasix 40 mg PO daily   Strict WINSTON's   Plan for CABG Wednesday 10/2 with Dr Guillory

## 2019-09-30 NOTE — PROGRESS NOTE ADULT - ASSESSMENT
68 yo M PMHx CAD s/p stent x 1 (2010), T2DM, HTN, Parkinson's presenting with c/o 10/10 chest pain described as burning radiating from abdomen to midsternum x 1 day. Pt s/p cardiac cath at [facility] found to have LAD 70%, RCA 80% Cx 90% stenosis requiring transfer to [facility] CT surgery service.   Hospital Course:   9/26 Nephrology consulted, aldactone discontinued. Pre op Transthoracic Echocardiogram Mitral annular calcification, otherwise normal mitral  valve. Calcified trileaflet aortic valve with normal opening. Mild aortic regurgitation. Mild concentric left ventricular hypertrophy. Endocardium not well visualized; grossly normal left ventricular systolic function.  9/27 VVS; Pre op Cardiac Surgery work up in progress. PT evaluation to assess pre op baseline activity level.  Plan for cardiac surgery in next week Wednesday with Dr. Guillory 10/2/19.   9/28     SB   holding BB,  plus one-two pedal edema   on lasix  creat imp to 2.77       9/30   HR  50-70 on Lop12.5 q12,  creat improving with renal following + diuretics, sinemet for parkinsons

## 2019-09-30 NOTE — PROGRESS NOTE ADULT - SUBJECTIVE AND OBJECTIVE BOX
No pain, no shortness of breath      VITAL:  T(C): , Max: 36.8 (09-29-19 @ 20:23)  T(F): , Max: 98.3 (09-29-19 @ 20:23)  HR: 61 (09-30-19 @ 05:11)  BP: 161/77 (09-30-19 @ 05:11)  BP(mean): --  RR: 18 (09-30-19 @ 05:11)  SpO2: 97% (09-30-19 @ 05:11)  Wt(kg): --      PHYSICAL EXAM:    Constitutional: NAD; Alert  HEENT:  NCAT; DMM  Neck: No JVD; supple  Respiratory: CTA-b/l  Cardiac: RRR s1s2  Gastrointestinal: BS+, soft, NT/ND  Urologic: No johnson  Extremities: No peripheral edema  Back: No CVAT b/l    LABS:                        12.3   9.8   )-----------( 209      ( 30 Sep 2019 05:55 )             35.9     Na(137)/K(4.0)/Cl(101)/HCO3(23)/BUN(43)/Cr(2.34)Glu(121)/Ca(9.8)/Mg(--)/PO4(--)    09-30 @ 05:55  Na(138)/K(3.9)/Cl(100)/HCO3(24)/BUN(43)/Cr(2.47)Glu(146)/Ca(9.8)/Mg(--)/PO4(--)    09-29 @ 06:34  Na(137)/K(4.2)/Cl(100)/HCO3(25)/BUN(43)/Cr(2.77)Glu(101)/Ca(10.0)/Mg(--)/PO4(--)    09-28 @ 06:36      IMPRESSION: 69M w/ HTN, DM2, CKD, Parkinson's, and CAD, planned for CABG Wednesday 10/2/19  (1)Renal - CKD 3-4; stable function  (2)CV - acceptable volume status for now, on standing Lasix      RECOMMEND:  (1)Meds as ordered; No ACE/ARB for now  (2)No objection to proceeding with OHS 10/2                Reji Musa MD  [facility]  [phone]

## 2019-09-30 NOTE — PROGRESS NOTE ADULT - SUBJECTIVE AND OBJECTIVE BOX
S: Denies chest pain or SOB. Review of systems otherwise (-)        MEDICATIONS  (STANDING):  aspirin enteric coated 81 milliGRAM(s) Oral daily  atorvastatin 80 milliGRAM(s) Oral at bedtime  carbidopa/levodopa  25/100 2 Tablet(s) Oral three times a day  docusate sodium 100 milliGRAM(s) Oral three times a day  enoxaparin Injectable 30 milliGRAM(s) SubCutaneous daily  furosemide    Tablet 40 milliGRAM(s) Oral daily  insulin glargine Injectable (LANTUS) 20 Unit(s) SubCutaneous at bedtime  insulin lispro (HumaLOG) corrective regimen sliding scale   SubCutaneous three times a day before meals  insulin lispro (HumaLOG) corrective regimen sliding scale   SubCutaneous at bedtime  insulin lispro Injectable (HumaLOG) 5 Unit(s) SubCutaneous three times a day with meals  levothyroxine 50 MICROGram(s) Oral daily  metoprolol tartrate 12.5 milliGRAM(s) Oral every 12 hours  mupirocin 2% Ointment 1 Application(s) Topical two times a day  nystatin Powder 1 Application(s) Topical two times a day  pantoprazole    Tablet 40 milliGRAM(s) Oral before breakfast  sodium chloride 0.9% lock flush 3 milliLiter(s) IV Push every 8 hours  trihexyphenidyl 2 milliGRAM(s) Oral three times a day    MEDICATIONS  (PRN):  artificial  tears Solution 1 Drop(s) Both EYES four times a day PRN dryness  simethicone 80 milliGRAM(s) Chew four times a day PRN Upset Stomach      LABS:                            12.3   9.8   )-----------( 209      ( 30 Sep 2019 05:55 )             35.9     Hemoglobin: 12.3 g/dL (09-30 @ 05:55)  Hemoglobin: 11.9 g/dL (09-29 @ 06:34)  Hemoglobin: 12.3 g/dL (09-28 @ 06:36)  Hemoglobin: 11.7 g/dL (09-27 @ 06:03)  Hemoglobin: 11.5 g/dL (09-26 @ 08:41)    09-30    137  |  101  |  43<H>  ----------------------------<  121<H>  4.0   |  23  |  2.34<H>    Ca    9.8      30 Sep 2019 05:55      Creatinine Trend: 2.34<--, 2.47<--, 2.77<--, 2.89<--, 2.38<--, 2.45<--       09-29-19 @ 07:01  -  09-30-19 @ 07:00  --------------------------------------------------------  IN: 1200 mL / OUT: 950 mL / NET: 250 mL    09-30-19 @ 07:01  -  09-30-19 @ 14:20  --------------------------------------------------------  IN: 600 mL / OUT: 750 mL / NET: -150 mL        PHYSICAL EXAM  Vital Signs Last 24 Hrs  T(C): 36.7 (30 Sep 2019 13:18), Max: 36.8 (29 Sep 2019 20:23)  T(F): 98 (30 Sep 2019 13:18), Max: 98.3 (29 Sep 2019 20:23)  HR: 58 (30 Sep 2019 13:18) (58 - 67)  BP: 151/75 (30 Sep 2019 13:18) (140/75 - 161/77)  BP(mean): --  RR: 18 (30 Sep 2019 13:18) (17 - 18)  SpO2: 99% (30 Sep 2019 13:18) (97% - 99%)        Gen: Appears well in NAD  HEENT:  (-)icterus (-)pallor  CV: N S1 S2 RRR no m/r/g   Resp:  Clear to auscultation B/L, normal effort  GI: (+) BS Soft, NT, ND  Lymph:  + non-pitting edema bilaterally (-)obvious lymphadenopathy  Skin: Warm to touch, Normal turgor  Psych: Appropriate mood and affect    TELEMETRY: SB/SR 50-70s    < from: Transthoracic Echocardiogram (09.22.19 @ 08:18) >  CONCLUSIONS:  1. Mitral annular calcification, otherwise normal mitral  valve. Mild mitral regurgitation.  2. Increased relative wall thickness with normal left  ventricular mass index, consistent with concentric left  ventricular remodeling.  3. Mild segmental left ventricular systolic dysfunction.  The anteroseptal and apical walls appear hypokinetic.  4. Mild diastolic dysfunction (Stage I).  5. The right ventricle is not well visualized; grossly  normal right ventricular systolic function.  ------------------------------------------------------------------------  Confirmed on  9/22/2019 - 12:25:06 by Jae Junior MD, [facility],  [facility]ly [facility], later [facility], [facility]  ------------------------------------------    < end of copied text >      ASSESSMENT/PLAN: 	  70 yo M PMHx CAD s/p stent x 1 (2010), T2DM, HTN, Parkinson's, history of meningioma, admitted with NSTEMI.     - Cardiac cath performed showing multivessel CAD  - Renal f/u - trend creatinine  - Continue ASA/Statin  - Tele stable - cont BB as tolerated  - Awaiting CABG on Wednesday  - CTS f/u    Yunier Quiroz PA-C  [facility]  Pager: [phone]

## 2019-10-01 LAB
ANION GAP SERPL CALC-SCNC: 13 MMOL/L — SIGNIFICANT CHANGE UP (ref 5–17)
BUN SERPL-MCNC: 44 MG/DL — HIGH (ref 7–23)
CALCIUM SERPL-MCNC: 10 MG/DL — SIGNIFICANT CHANGE UP (ref 8.4–10.5)
CHLORIDE SERPL-SCNC: 99 MMOL/L — SIGNIFICANT CHANGE UP (ref 96–108)
CO2 SERPL-SCNC: 25 MMOL/L — SIGNIFICANT CHANGE UP (ref 22–31)
CREAT SERPL-MCNC: 2.7 MG/DL — HIGH (ref 0.5–1.3)
GLUCOSE SERPL-MCNC: 112 MG/DL — HIGH (ref 70–99)
HCT VFR BLD CALC: 34.2 % — LOW (ref 39–50)
HGB BLD-MCNC: 11.1 G/DL — LOW (ref 13–17)
MCHC RBC-ENTMCNC: 30.8 PG — SIGNIFICANT CHANGE UP (ref 27–34)
MCHC RBC-ENTMCNC: 32.6 GM/DL — SIGNIFICANT CHANGE UP (ref 32–36)
MCV RBC AUTO: 94.6 FL — SIGNIFICANT CHANGE UP (ref 80–100)
PLATELET # BLD AUTO: 231 K/UL — SIGNIFICANT CHANGE UP (ref 150–400)
POTASSIUM SERPL-MCNC: 3.8 MMOL/L — SIGNIFICANT CHANGE UP (ref 3.5–5.3)
POTASSIUM SERPL-SCNC: 3.8 MMOL/L — SIGNIFICANT CHANGE UP (ref 3.5–5.3)
RBC # BLD: 3.62 M/UL — LOW (ref 4.2–5.8)
RBC # FLD: 12.4 % — SIGNIFICANT CHANGE UP (ref 10.3–14.5)
SODIUM SERPL-SCNC: 137 MMOL/L — SIGNIFICANT CHANGE UP (ref 135–145)
WBC # BLD: 9.5 K/UL — SIGNIFICANT CHANGE UP (ref 3.8–10.5)
WBC # FLD AUTO: 9.5 K/UL — SIGNIFICANT CHANGE UP (ref 3.8–10.5)

## 2019-10-01 RX ORDER — CEFUROXIME AXETIL 250 MG
1500 TABLET ORAL ONCE
Refills: 0 | Status: DISCONTINUED | OUTPATIENT
Start: 2019-10-01 | End: 2019-10-02

## 2019-10-01 RX ORDER — CHLORHEXIDINE GLUCONATE 213 G/1000ML
1 SOLUTION TOPICAL
Refills: 0 | Status: COMPLETED | OUTPATIENT
Start: 2019-10-01 | End: 2019-10-02

## 2019-10-01 RX ADMIN — Medication 2 MILLIGRAM(S): at 21:47

## 2019-10-01 RX ADMIN — Medication 12.5 MILLIGRAM(S): at 10:36

## 2019-10-01 RX ADMIN — Medication 100 MILLIGRAM(S): at 21:47

## 2019-10-01 RX ADMIN — INSULIN GLARGINE 20 UNIT(S): 100 INJECTION, SOLUTION SUBCUTANEOUS at 21:47

## 2019-10-01 RX ADMIN — PANTOPRAZOLE SODIUM 40 MILLIGRAM(S): 20 TABLET, DELAYED RELEASE ORAL at 06:19

## 2019-10-01 RX ADMIN — Medication 100 MILLIGRAM(S): at 06:19

## 2019-10-01 RX ADMIN — Medication 81 MILLIGRAM(S): at 12:25

## 2019-10-01 RX ADMIN — Medication 40 MILLIGRAM(S): at 06:19

## 2019-10-01 RX ADMIN — SODIUM CHLORIDE 3 MILLILITER(S): 9 INJECTION INTRAMUSCULAR; INTRAVENOUS; SUBCUTANEOUS at 21:48

## 2019-10-01 RX ADMIN — CARBIDOPA AND LEVODOPA 2 TABLET(S): 25; 100 TABLET ORAL at 06:19

## 2019-10-01 RX ADMIN — Medication 2 MILLIGRAM(S): at 06:19

## 2019-10-01 RX ADMIN — Medication 5 UNIT(S): at 17:48

## 2019-10-01 RX ADMIN — SODIUM CHLORIDE 3 MILLILITER(S): 9 INJECTION INTRAMUSCULAR; INTRAVENOUS; SUBCUTANEOUS at 06:19

## 2019-10-01 RX ADMIN — Medication 5 UNIT(S): at 12:35

## 2019-10-01 RX ADMIN — Medication 12.5 MILLIGRAM(S): at 21:47

## 2019-10-01 RX ADMIN — CHLORHEXIDINE GLUCONATE 1 APPLICATION(S): 213 SOLUTION TOPICAL at 21:43

## 2019-10-01 RX ADMIN — Medication 100 MILLIGRAM(S): at 14:01

## 2019-10-01 RX ADMIN — Medication 2 MILLIGRAM(S): at 14:01

## 2019-10-01 RX ADMIN — Medication 1: at 12:37

## 2019-10-01 RX ADMIN — CARBIDOPA AND LEVODOPA 2 TABLET(S): 25; 100 TABLET ORAL at 14:01

## 2019-10-01 RX ADMIN — MUPIROCIN 1 APPLICATION(S): 20 OINTMENT TOPICAL at 06:18

## 2019-10-01 RX ADMIN — CARBIDOPA AND LEVODOPA 2 TABLET(S): 25; 100 TABLET ORAL at 21:54

## 2019-10-01 RX ADMIN — MUPIROCIN 1 APPLICATION(S): 20 OINTMENT TOPICAL at 17:49

## 2019-10-01 RX ADMIN — NYSTATIN CREAM 1 APPLICATION(S): 100000 CREAM TOPICAL at 06:18

## 2019-10-01 RX ADMIN — NYSTATIN CREAM 1 APPLICATION(S): 100000 CREAM TOPICAL at 17:50

## 2019-10-01 RX ADMIN — Medication 50 MICROGRAM(S): at 06:19

## 2019-10-01 RX ADMIN — ATORVASTATIN CALCIUM 80 MILLIGRAM(S): 80 TABLET, FILM COATED ORAL at 21:47

## 2019-10-01 RX ADMIN — SODIUM CHLORIDE 3 MILLILITER(S): 9 INJECTION INTRAMUSCULAR; INTRAVENOUS; SUBCUTANEOUS at 13:45

## 2019-10-01 NOTE — PROGRESS NOTE ADULT - ASSESSMENT
Assessment  DMT2: 69y Male with DM T2 with hyperglycemia, A1C 7.2%, was on oral meds and insulin at home, started on low dose insulin, blood sugars trending down, no hypoglycemic episode, eating meals, ambulating, appears comfortable.  Hypothyroidism: Patient has no hx thyroid dz, was not on any thyroid supplements, he denies neck swelling, neck pain, no hx neck surgery. Found incidentally with TSH 6.22 and was started on synthroid 50mcg PO daily.  CAD: CABG scheduled 10/2, now undergoing pre-op workup, on medications, no chest pain, stable, monitored.  HTN: Controlled,  on antihypertensive medications.  Parkinson's: on meds, stable.          Kelsie Reece MD  Cell: [phone]  Office: [phone]

## 2019-10-01 NOTE — DIETITIAN INITIAL EVALUATION ADULT. - ADD RECOMMEND
1. Continue consistent CHO diet as ordered. 2. Pt declined nutrition supplement, encouraged PO intake to consume adequate nutrition. 3. Briefly discussed T2DM nutrition therapy - RD available for additional education PRN. 4. Monitor weight status, PO intake, bowels. 5. New Malnutrition alert placed - will follow-up per protocol

## 2019-10-01 NOTE — DIETITIAN INITIAL EVALUATION ADULT. - OTHER INFO
68 yo M PMHx CAD s/p stent x 1 (2010), T2DM, HTN, Parkinson's presenting with c/o 10/10 chest pain described as burning radiating from abdomen to midsternum x 1 day. Pt schedules for CABG tomorrow 10/2. 68 yo M PMHx CAD s/p stent x 1 (2010), T2DM, HTN, Parkinson's presenting with c/o 10/10 chest pain described as burning radiating from abdomen to midsternum x 1 day. Pt schedules for CABG tomorrow 10/2.    Pt notes not eating well in-house - does not like food. Pt thinks he lost weight in-house secondary to decreased appetite/PO intake. admit weight 239.6 pounds, weight today 10/1 234.5 pounds. Pt declined Nutrition focused physical exam at this time. Pt also declined nutrition supplement. Encouraged PO intake to consume adequate nutrition. Diet at home noted to consist of fast foods. Notes he has DM A1c 7.2% (9/26) - briefly discussed diet therapy in relation; pt uninterested in written education. NKFA. Denies N+V, Diarrhea, constipation.

## 2019-10-01 NOTE — PROGRESS NOTE ADULT - SUBJECTIVE AND OBJECTIVE BOX
[facility] [phone]        Patient seen and examined in bed.  He was in good spirits         MEDICATIONS  (STANDING):  aspirin enteric coated 81 milliGRAM(s) Oral daily  atorvastatin 80 milliGRAM(s) Oral at bedtime  carbidopa/levodopa  25/100 2 Tablet(s) Oral three times a day  docusate sodium 100 milliGRAM(s) Oral three times a day  enoxaparin Injectable 30 milliGRAM(s) SubCutaneous daily  furosemide    Tablet 40 milliGRAM(s) Oral daily  insulin glargine Injectable (LANTUS) 20 Unit(s) SubCutaneous at bedtime  insulin lispro (HumaLOG) corrective regimen sliding scale   SubCutaneous three times a day before meals  insulin lispro (HumaLOG) corrective regimen sliding scale   SubCutaneous at bedtime  insulin lispro Injectable (HumaLOG) 5 Unit(s) SubCutaneous three times a day with meals  levothyroxine 50 MICROGram(s) Oral daily  metoprolol tartrate 12.5 milliGRAM(s) Oral every 12 hours  mupirocin 2% Ointment 1 Application(s) Topical two times a day  nystatin Powder 1 Application(s) Topical two times a day  pantoprazole    Tablet 40 milliGRAM(s) Oral before breakfast  sodium chloride 0.9% lock flush 3 milliLiter(s) IV Push every 8 hours  trihexyphenidyl 2 milliGRAM(s) Oral three times a day      VITAL:  T(C): , Max: 36.7 (09-30-19 @ 13:18)  T(F): , Max: 98.1 (10-01-19 @ 05:00)  HR: 51 (10-01-19 @ 05:00)  BP: 143/76 (10-01-19 @ 05:00)  BP(mean): --  RR: 18 (10-01-19 @ 05:00)  SpO2: 97% (10-01-19 @ 05:00)  Wt(kg): --    I and O's:    09-30 @ 07:01  -  10-01 @ 07:00  --------------------------------------------------------  IN: 890 mL / OUT: 1520 mL / NET: -630 mL          PHYSICAL EXAM:    Constitutional: NAD  Neck:  No JVD  Respiratory: CTAB/L  Cardiovascular: S1 and S2  Gastrointestinal: BS+, soft, NT/ND  Extremities: No peripheral edema  Neurological: A/O x 3, no focal deficits  Psychiatric: Normal mood, normal affect  : No Javier  Skin: No rashes  Access: Not applicable    LABS:                        11.1   9.5   )-----------( 231      ( 01 Oct 2019 06:54 )             34.2     10-01    137  |  99  |  44<H>  ----------------------------<  112<H>  3.8   |  25  |  2.70<H>    Ca    10.0      01 Oct 2019 06:54            Urine Studies:          RADIOLOGY & ADDITIONAL STUDIES:

## 2019-10-01 NOTE — PROGRESS NOTE ADULT - SUBJECTIVE AND OBJECTIVE BOX
S: Denies chest pain, SOB, palpitations or dizziness. Review of systems otherwise (-)      MEDICATIONS  (STANDING):  aspirin enteric coated 81 milliGRAM(s) Oral daily  atorvastatin 80 milliGRAM(s) Oral at bedtime  carbidopa/levodopa  25/100 2 Tablet(s) Oral three times a day  docusate sodium 100 milliGRAM(s) Oral three times a day  enoxaparin Injectable 30 milliGRAM(s) SubCutaneous daily  furosemide    Tablet 40 milliGRAM(s) Oral daily  insulin glargine Injectable (LANTUS) 20 Unit(s) SubCutaneous at bedtime  insulin lispro (HumaLOG) corrective regimen sliding scale   SubCutaneous three times a day before meals  insulin lispro (HumaLOG) corrective regimen sliding scale   SubCutaneous at bedtime  insulin lispro Injectable (HumaLOG) 5 Unit(s) SubCutaneous three times a day with meals  levothyroxine 50 MICROGram(s) Oral daily  metoprolol tartrate 12.5 milliGRAM(s) Oral every 12 hours  mupirocin 2% Ointment 1 Application(s) Topical two times a day  nystatin Powder 1 Application(s) Topical two times a day  pantoprazole    Tablet 40 milliGRAM(s) Oral before breakfast  sodium chloride 0.9% lock flush 3 milliLiter(s) IV Push every 8 hours  trihexyphenidyl 2 milliGRAM(s) Oral three times a day    MEDICATIONS  (PRN):  artificial  tears Solution 1 Drop(s) Both EYES four times a day PRN dryness  simethicone 80 milliGRAM(s) Chew four times a day PRN Upset Stomach      LABS:                            11.1   9.5   )-----------( 231      ( 01 Oct 2019 06:54 )             34.2     Hemoglobin: 11.1 g/dL (10-01 @ 06:54)  Hemoglobin: 12.3 g/dL (09-30 @ 05:55)  Hemoglobin: 11.9 g/dL (09-29 @ 06:34)  Hemoglobin: 12.3 g/dL (09-28 @ 06:36)  Hemoglobin: 11.7 g/dL (09-27 @ 06:03)    10-01    137  |  99  |  44<H>  ----------------------------<  112<H>  3.8   |  25  |  2.70<H>    Ca    10.0      01 Oct 2019 06:54      Creatinine Trend: 2.70<--, 2.34<--, 2.47<--, 2.77<--, 2.89<--, 2.38<--       09-30-19 @ 07:01  -  10-01-19 @ 07:00  --------------------------------------------------------  IN: 890 mL / OUT: 1520 mL / NET: -630 mL    10-01-19 @ 07:01  -  10-01-19 @ 12:35  --------------------------------------------------------  IN: 360 mL / OUT: 515 mL / NET: -155 mL        PHYSICAL EXAM  Vital Signs Last 24 Hrs  T(C): 36.7 (01 Oct 2019 05:00), Max: 36.7 (30 Sep 2019 13:18)  T(F): 98.1 (01 Oct 2019 05:00), Max: 98.1 (01 Oct 2019 05:00)  HR: 51 (01 Oct 2019 05:00) (51 - 80)  BP: 143/76 (01 Oct 2019 05:00) (143/76 - 157/73)  BP(mean): --  RR: 18 (01 Oct 2019 05:00) (18 - 18)  SpO2: 97% (01 Oct 2019 05:00) (96% - 99%)      Gen: Appears well in NAD  HEENT:  (-)icterus (-)pallor  CV: N S1 S2 RRR no m/r/g   Resp:  Clear to auscultation B/L, normal effort  GI: (+) BS Soft, NT, ND  Lymph:  + non-pitting edema bilaterally (-)obvious lymphadenopathy  Skin: Warm to touch, Normal turgor  Psych: Appropriate mood and affect    TELEMETRY: SB/SR 50-70s    < from: Transthoracic Echocardiogram (09.22.19 @ 08:18) >  CONCLUSIONS:  1. Mitral annular calcification, otherwise normal mitral  valve. Mild mitral regurgitation.  2. Increased relative wall thickness with normal left  ventricular mass index, consistent with concentric left  ventricular remodeling.  3. Mild segmental left ventricular systolic dysfunction.  The anteroseptal and apical walls appear hypokinetic.  4. Mild diastolic dysfunction (Stage I).  5. The right ventricle is not well visualized; grossly  normal right ventricular systolic function.  ------------------------------------------------------------------------  Confirmed on  9/22/2019 - 12:25:06 by Jae Junior MD, Merged with [facility],  Formerly [facility], [facility]  ------------------------------------------    < end of copied text >      ASSESSMENT/PLAN: 	  70 yo M PMHx CAD s/p stent x 1 (2010), T2DM, HTN, Parkinson's, history of meningioma, admitted with NSTEMI.     - Cardiac cath performed showing multivessel CAD  - Renal f/u - trend creatinine  - Continue ASA/Statin  - Tele stable - cont BB as tolerated  - Awaiting CABG tomorrow  - Supportive care per CTS appreciated    Yunier Quiroz PA-C  [facility]  Pager: [phone]

## 2019-10-01 NOTE — DIETITIAN INITIAL EVALUATION ADULT. - PERTINENT LABORATORY DATA
10-01 Na137 mmol/L Glu 112 mg/dL<H> K+ 3.8 mmol/L Cr  2.70 mg/dL<H> BUN 44 mg/dL<H> Phos n/a   Alb n/a   PAB n/a

## 2019-10-01 NOTE — DIETITIAN INITIAL EVALUATION ADULT. - PHYSICAL APPEARANCE
other (specify)/obese Ht: 5'11" Wt: 239.6 pounds  BMI: 33.4 kg/m2 IBW: 155 pounds  (+/-10%) 155%IBW  Edema per nursing flowsheets: 1+ R+L leg  Skin per nursing flowsheets: no pressure injuries noted

## 2019-10-01 NOTE — PROGRESS NOTE ADULT - ASSESSMENT
68 yo M PMHx CAD s/p stent x 1 (2010), T2DM, HTN, Parkinson's presenting with c/o 10/10 chest pain described as burning radiating from abdomen to midsternum x 1 day. Pt s/p cardiac cath at [facility] found to have LAD 70%, RCA 80% Cx 90% stenosis requiring transfer to [facility] CT surgery service.   Hospital Course:   9/26 Nephrology consulted, aldactone discontinued. Pre op Transthoracic Echocardiogram Mitral annular calcification, otherwise normal mitral  valve. Calcified trileaflet aortic valve with normal opening. Mild aortic regurgitation. Mild concentric left ventricular hypertrophy. Endocardium not well visualized; grossly normal left ventricular systolic function.  9/27 VVS; Pre op Cardiac Surgery work up in progress. PT evaluation to assess pre op baseline activity level.  Plan for cardiac surgery in next week Wednesday with Dr. Guillory 10/2/19.   9/28     SB   holding BB,  plus one-two pedal edema   on lasix  creat imp to 2.77       9/30   HR  50-70 on Lop12.5 q12,  creat improving with renal following + diuretics, sinemet for parkinsons   10/1 Pt for OR tomorrow, lovenox d/c  Cont Beta blocker

## 2019-10-01 NOTE — PROGRESS NOTE ADULT - SUBJECTIVE AND OBJECTIVE BOX
Cardiac Surgery Pre-op Note:     Surgeon:    Procedure: (Date) (Procedure)    HPI:  70 yo M PMHx CAD s/p stent x 1 (), T2DM, HTN, Parkinson's presenting with c/o 10/10 chest pain described as burning radiating from abdomen to midsternum x 1 day. Patient reports symptoms started at 2 pm and resolved after 1 hour. Chest pain began after he ate lunch and was walking up a flight of stairs. He reports chest pain was associated with dyspnea, diaphoresis, and nausea. At baseline, he ambulates with cane/walker. Takes medications daily including aspirin and prasugrel. On ROS, reports chronic left leg swelling x 1-2 years. Pt s/p cardiac cath at [facility] found to have multivessel disease requiring transfer to [facility] CT surgery service. Pt denies any chest pain, palpitations, SOB, N/V/D, fever, syncope, changes in vision, numbness/tingling or recent travel. (25 Sep 2019 23:44)      PAST MEDICAL & SURGICAL HISTORY:  Shoulder pain, left  Osteoarthritis  Panic attacks  Urinary frequency  Urinary incontinence  Nocturia: x2-3  Gastroesophageal reflux disease without esophagitis: diet controlled  Vertigo: not improved with meclizine in past  Insomnia  Autoimmune disease: started on prednisone on 12/23/15 for &quot;inflammation&quot; but does not know diagnosis  Leg swelling: left leg  CAD (coronary artery disease): s/p 1 stent in   Parkinson disease  Hypercholesterolemia  Diabetes Mellitus, Type 2  Hypertension  S/P angioplasty with stent: 1 stent, RCA  Status Post Cardiac Catheterization      MEDICATIONS  (STANDING):  aspirin enteric coated 81 milliGRAM(s) Oral daily  atorvastatin 80 milliGRAM(s) Oral at bedtime  carbidopa/levodopa  25/100 2 Tablet(s) Oral three times a day  docusate sodium 100 milliGRAM(s) Oral three times a day  enoxaparin Injectable 30 milliGRAM(s) SubCutaneous daily  furosemide    Tablet 40 milliGRAM(s) Oral daily  insulin glargine Injectable (LANTUS) 20 Unit(s) SubCutaneous at bedtime  insulin lispro (HumaLOG) corrective regimen sliding scale   SubCutaneous three times a day before meals  insulin lispro (HumaLOG) corrective regimen sliding scale   SubCutaneous at bedtime  insulin lispro Injectable (HumaLOG) 5 Unit(s) SubCutaneous three times a day with meals  levothyroxine 50 MICROGram(s) Oral daily  metoprolol tartrate 12.5 milliGRAM(s) Oral every 12 hours  mupirocin 2% Ointment 1 Application(s) Topical two times a day  nystatin Powder 1 Application(s) Topical two times a day  pantoprazole    Tablet 40 milliGRAM(s) Oral before breakfast  sodium chloride 0.9% lock flush 3 milliLiter(s) IV Push every 8 hours  trihexyphenidyl 2 milliGRAM(s) Oral three times a day    MEDICATIONS  (PRN):  artificial  tears Solution 1 Drop(s) Both EYES four times a day PRN dryness  simethicone 80 milliGRAM(s) Chew four times a day PRN Upset Stomach      Vital Signs Last 24 Hrs  T(C): 36.7 (10-01-19 @ 12:48), Max: 36.7 (10-01-19 @ 05:00)  T(F): 98 (10-01-19 @ 12:48), Max: 98.1 (10-01-19 @ 05:00)  HR: 66 (10-01-19 @ 12:48) (51 - 80)  BP: 149/71 (10-01-19 @ 12:48) (143/76 - 157/73)  RR: 18 (10-01-19 @ 12:48) (18 - 18)  SpO2: 95% (10-01-19 @ 12:48) (95% - 97%)          ABO Interpretation: A ( @ 05:50)     Daily     Daily Weight in k.4 (01 Oct 2019 07:44)  Admit Wt: Drug Dosing Weight  Height (cm): 180.3 (25 Sep 2019 21:00)  Weight (kg): 108.7 (25 Sep 2019 21:00)  BMI (kg/m2): 33.4 (25 Sep 2019 21:00)  BSA (m2): 2.28 (25 Sep 2019 21:00)    Labs:                        11.1   9.5   )-----------( 231      ( 01 Oct 2019 06:54 )             34.2     10-01    137  |  99  |  44<H>  ----------------------------<  112<H>  3.8   |  25  |  2.70<H>    Ca    10.0      01 Oct 2019 06:54          Blood Type: ABO Interpretation: A ( @ 05:50)    HGB A1C: 7.2  Pro-BNP: Serum Pro-Brain Natriuretic Peptide: 212 pg/mL (19 @ 23:47)      Thyroid Panel:  @ 08:04/--   1.3/--/--   @ 02:38/6.22  --/--/--    MRSA:  / MSSA:  Staph Aureus PCR Result: Detected (19 @ 02:38) - on argatroban      P2Y12 P2Y12 Plt Reactivity: 126: Assay performance may be affected by Hematocrit values <33% or >53% or  Platelet count <119 or >502 K/ul. PRU (19 @ 00:03)        CXR:    < from: Xray Chest 1 View AP/PA (19 @ 23:35) >  Clear lungs. The left costophrenic angle is not imaged.    < end of copied text >    Carotid Duplex:   neg    PFT's:      Echocardiogram:   < from: Transthoracic Echocardiogram (19 @ 13:11) >  1. Mitral annular calcification, otherwise normal mitral  valve.  2. Calcified trileaflet aortic valve with normal opening.  Mild aortic regurgitation.  3. Mild concentric left ventricular hypertrophy.  4. Endocardium not well visualized; grossly normal left  ventricular systolic function.  5. Mild diastolic dysfunction (StageI).  6. The right ventricle is not well visualized; grossly  normal right ventricular systolic function.  7. Normal pericardium with no pericardial effusion.    < end of copied text >    Cardiac catheterization: < from: Cardiac Cath Lab - Adult (19 @ 14:21) >  demonstrated an EF of 45 %.  CORONARY VESSELS: The coronary circulation is right dominant.  LM:   --  LM: Normal.  LAD:   --  Proximal LAD: There was a tubular 70 % stenosis.  --  Mid LAD: Angiography showed minor luminal irregularities with no flow  limiting lesions.  --  Distal LAD: There was a tubular 70 % stenosis.  --  D1: Angiography showed mild atherosclerosis with no flow limiting  lesions.  CX:   --  Proximal circumflex: Angiography showed minor luminal  irregularities with no flow limiting lesions.  --  Mid circumflex: There was a tubular 90 % stenosis.  --  Distal circumflex: Angiography showed minor luminal irregularities with  no flow limiting lesions.  --  OM1: Angiography showed minor luminal irregularities with no flow  limiting lesions.  RCA:   --  Proximal RCA: Angiography showed mild atherosclerosis with no  flow limiting lesions.  --  Mid RCA: Angiography showed minor luminal irregularities with no flow  limiting lesions. There was no significant restenosis in mid RCA stent.  --  Distal RCA: There was a discrete 80 % stenosis.  --  RPDA: There was a 100 % stenosis with the distal vessel being filled  via collaterals. This lesion is a chronic total occlusion.    < end of copied text >      PHYSICAL EXAM:  Neuro: A&Ox3, NAD  Pulm: B/L BS CTA  CV: RRR,+S1S2  Abd: Soft, NT/ND +[id number]  Ext: B/L LE no edema, +PP, no calf tenderness

## 2019-10-01 NOTE — PROGRESS NOTE ADULT - SUBJECTIVE AND OBJECTIVE BOX
Chief complaint  Patient is a 69y old  Male who presents with a chief complaint of Multivessel disease (01 Oct 2019 08:49)   Review of systems  Patient in bed, looks comfortable, no fever, no hypoglycemia.    Labs and Fingersticks  CAPILLARY BLOOD GLUCOSE      POCT Blood Glucose.: 150 mg/dL (01 Oct 2019 08:32)  POCT Blood Glucose.: 129 mg/dL (30 Sep 2019 21:11)  POCT Blood Glucose.: 104 mg/dL (30 Sep 2019 17:21)  POCT Blood Glucose.: 135 mg/dL (30 Sep 2019 11:33)      Anion Gap, Serum: 13 (10-01 @ 06:54)  Anion Gap, Serum: 13 (09-30 @ 05:55)      Calcium, Total Serum: 10.0 (10-01 @ 06:54)  Calcium, Total Serum: 9.8 (09-30 @ 05:55)          10-01    137  |  99  |  44<H>  ----------------------------<  112<H>  3.8   |  25  |  2.70<H>    Ca    10.0      01 Oct 2019 06:54                          11.1   9.5   )-----------( 231      ( 01 Oct 2019 06:54 )             34.2     Medications  MEDICATIONS  (STANDING):  aspirin enteric coated 81 milliGRAM(s) Oral daily  atorvastatin 80 milliGRAM(s) Oral at bedtime  carbidopa/levodopa  25/100 2 Tablet(s) Oral three times a day  docusate sodium 100 milliGRAM(s) Oral three times a day  enoxaparin Injectable 30 milliGRAM(s) SubCutaneous daily  furosemide    Tablet 40 milliGRAM(s) Oral daily  insulin glargine Injectable (LANTUS) 20 Unit(s) SubCutaneous at bedtime  insulin lispro (HumaLOG) corrective regimen sliding scale   SubCutaneous three times a day before meals  insulin lispro (HumaLOG) corrective regimen sliding scale   SubCutaneous at bedtime  insulin lispro Injectable (HumaLOG) 5 Unit(s) SubCutaneous three times a day with meals  levothyroxine 50 MICROGram(s) Oral daily  metoprolol tartrate 12.5 milliGRAM(s) Oral every 12 hours  mupirocin 2% Ointment 1 Application(s) Topical two times a day  nystatin Powder 1 Application(s) Topical two times a day  pantoprazole    Tablet 40 milliGRAM(s) Oral before breakfast  sodium chloride 0.9% lock flush 3 milliLiter(s) IV Push every 8 hours  trihexyphenidyl 2 milliGRAM(s) Oral three times a day      Physical Exam  General: Patient comfortable in bed  Vital Signs Last 12 Hrs  T(F): 98.1 (10-01-19 @ 05:00), Max: 98.1 (10-01-19 @ 05:00)  HR: 51 (10-01-19 @ 05:00) (51 - 51)  BP: 143/76 (10-01-19 @ 05:00) (143/76 - 143/76)  BP(mean): --  RR: 18 (10-01-19 @ 05:00) (18 - 18)  SpO2: 97% (10-01-19 @ 05:00) (97% - 97%)  Neck: No palpable thyroid nodules.  CVS: S1S2, No murmurs  Respiratory: No wheezing, no crepitations  GI: Abdomen soft, bowel sounds positive  Musculoskeletal:  edema lower extremities.   Skin: No skin rashes, no ecchymosis    Diagnostics

## 2019-10-01 NOTE — CHART NOTE - NSCHARTNOTEFT_GEN_A_CORE
Upon Nutritional Assessment by the Registered Dietitian your patient was determined to meet criteria / has evidence of the following diagnosis/diagnoses:          [ ]  Mild Protein Calorie Malnutrition        [x ]  Moderate Protein Calorie Malnutrition        [ ] Severe Protein Calorie Malnutrition        [ ] Unspecified Protein Calorie Malnutrition        [ ] Underweight / BMI <19        [ ] Morbid Obesity / BMI > 40      Findings as based on:  [ x] Comprehensive nutrition assessment   [ ] Nutrition Focused Physical Exam  [x ] Other: 2.08% (5 Ib.) wt loss in 1 wk, meeting <75% est. needs for >7 days.       Nutrition Plan/Recommendations:    1. Continue consistent CHO diet as ordered.   2. Pt declined nutrition supplement, encouraged PO intake to consume adequate nutrition.   3. Briefly discussed T2DM nutrition therapy - RD available for additional education PRN.  4. Monitor weight status, PO intake, bowels.     PROVIDER Section:     By signing this assessment you are acknowledging and agree with the diagnosis/diagnoses assigned by the Registered Dietitian    Comments:

## 2019-10-01 NOTE — PROGRESS NOTE ADULT - ASSESSMENT
70 yo M PMHx CAD s/p stent x 1 (2010), T2DM, HTN, Parkinson's and CKD stage 3b presents with chest pain. S/P cath with MVD-awaiting CABG  Subnephrotic range proteinuria     1. Renal - [id number].  Trend BMP.  Pt was told of the risk of ABBY post op and he understands the potential for HD.  Call placed to his son as well.      2. [facility]  OR date with Dr Guillory for Wed   Off aldactone      3. Pulm - LE dopplers negative for DVT    Bladder scan now

## 2019-10-02 ENCOUNTER — APPOINTMENT (OUTPATIENT)
Dept: CARDIOTHORACIC SURGERY | Facility: HOSPITAL | Age: 69
End: 2019-10-02

## 2019-10-02 LAB
ALBUMIN SERPL ELPH-MCNC: 3.1 G/DL — LOW (ref 3.3–5)
ALP SERPL-CCNC: 54 U/L — SIGNIFICANT CHANGE UP (ref 40–120)
ALT FLD-CCNC: 5 U/L — LOW (ref 10–45)
ANION GAP SERPL CALC-SCNC: 17 MMOL/L — SIGNIFICANT CHANGE UP (ref 5–17)
APTT BLD: 25.9 SEC — LOW (ref 27.5–36.3)
AST SERPL-CCNC: 34 U/L — SIGNIFICANT CHANGE UP (ref 10–40)
BASE EXCESS BLDV CALC-SCNC: -0.5 MMOL/L — SIGNIFICANT CHANGE UP (ref -2–2)
BASE EXCESS BLDV CALC-SCNC: -1.9 MMOL/L — SIGNIFICANT CHANGE UP (ref -2–2)
BASE EXCESS BLDV CALC-SCNC: -2 MMOL/L — SIGNIFICANT CHANGE UP (ref -2–2)
BASE EXCESS BLDV CALC-SCNC: 0.4 MMOL/L — SIGNIFICANT CHANGE UP (ref -2–2)
BASOPHILS # BLD AUTO: 0.03 K/UL — SIGNIFICANT CHANGE UP (ref 0–0.2)
BASOPHILS NFR BLD AUTO: 0.2 % — SIGNIFICANT CHANGE UP (ref 0–2)
BILIRUB SERPL-MCNC: 0.8 MG/DL — SIGNIFICANT CHANGE UP (ref 0.2–1.2)
BLOOD GAS VENOUS - CREATININE: SIGNIFICANT CHANGE UP MG/DL (ref 0.5–1.3)
BUN SERPL-MCNC: 35 MG/DL — HIGH (ref 7–23)
CA-I SERPL-SCNC: 1.07 MMOL/L — LOW (ref 1.12–1.3)
CA-I SERPL-SCNC: 1.11 MMOL/L — LOW (ref 1.12–1.3)
CA-I SERPL-SCNC: 1.13 MMOL/L — SIGNIFICANT CHANGE UP (ref 1.12–1.3)
CA-I SERPL-SCNC: 1.22 MMOL/L — SIGNIFICANT CHANGE UP (ref 1.12–1.3)
CALCIUM SERPL-MCNC: 8.7 MG/DL — SIGNIFICANT CHANGE UP (ref 8.4–10.5)
CHLORIDE BLDV-SCNC: SIGNIFICANT CHANGE UP MMOL/L (ref 96–108)
CHLORIDE SERPL-SCNC: 103 MMOL/L — SIGNIFICANT CHANGE UP (ref 96–108)
CK MB BLD-MCNC: 8.4 % — HIGH (ref 0–3.5)
CK MB CFR SERPL CALC: 26.1 NG/ML — HIGH (ref 0–6.7)
CK SERPL-CCNC: 310 U/L — HIGH (ref 30–200)
CO2 SERPL-SCNC: 23 MMOL/L — SIGNIFICANT CHANGE UP (ref 22–31)
CREAT SERPL-MCNC: 2.23 MG/DL — HIGH (ref 0.5–1.3)
EOSINOPHIL # BLD AUTO: 0.08 K/UL — SIGNIFICANT CHANGE UP (ref 0–0.5)
EOSINOPHIL NFR BLD AUTO: 0.5 % — SIGNIFICANT CHANGE UP (ref 0–6)
FIBRINOGEN PPP-MCNC: 425 MG/DL — SIGNIFICANT CHANGE UP (ref 350–510)
GAS PNL BLDA: SIGNIFICANT CHANGE UP
GAS PNL BLDV: 135 MMOL/L — SIGNIFICANT CHANGE UP (ref 135–145)
GAS PNL BLDV: 135 MMOL/L — SIGNIFICANT CHANGE UP (ref 135–145)
GAS PNL BLDV: 136 MMOL/L — SIGNIFICANT CHANGE UP (ref 135–145)
GAS PNL BLDV: 136 MMOL/L — SIGNIFICANT CHANGE UP (ref 135–145)
GAS PNL BLDV: SIGNIFICANT CHANGE UP
GLUCOSE BLDV-MCNC: 131 MG/DL — HIGH (ref 70–99)
GLUCOSE BLDV-MCNC: 136 MG/DL — HIGH (ref 70–99)
GLUCOSE BLDV-MCNC: 140 MG/DL — HIGH (ref 70–99)
GLUCOSE BLDV-MCNC: 147 MG/DL — HIGH (ref 70–99)
GLUCOSE SERPL-MCNC: 163 MG/DL — HIGH (ref 70–99)
HCO3 BLDV-SCNC: 23 MMOL/L — SIGNIFICANT CHANGE UP (ref 21–29)
HCO3 BLDV-SCNC: 24 MMOL/L — SIGNIFICANT CHANGE UP (ref 21–29)
HCO3 BLDV-SCNC: 25 MMOL/L — SIGNIFICANT CHANGE UP (ref 21–29)
HCO3 BLDV-SCNC: 26 MMOL/L — SIGNIFICANT CHANGE UP (ref 21–29)
HCT VFR BLD CALC: 24 % — LOW (ref 39–50)
HCT VFR BLDA CALC: 25 % — LOW (ref 39–50)
HCT VFR BLDA CALC: 26 % — LOW (ref 39–50)
HCT VFR BLDA CALC: 28 % — LOW (ref 39–50)
HCT VFR BLDA CALC: 28 % — LOW (ref 39–50)
HGB BLD CALC-MCNC: 8.1 G/DL — LOW (ref 13–17)
HGB BLD CALC-MCNC: 8.3 G/DL — LOW (ref 13–17)
HGB BLD CALC-MCNC: 9 G/DL — LOW (ref 13–17)
HGB BLD CALC-MCNC: 9 G/DL — LOW (ref 13–17)
HGB BLD-MCNC: 8.5 G/DL — LOW (ref 13–17)
HOROWITZ INDEX BLDV+IHG-RTO: 0 — SIGNIFICANT CHANGE UP
IMM GRANULOCYTES NFR BLD AUTO: 0.5 % — SIGNIFICANT CHANGE UP (ref 0–1.5)
INR BLD: 1.27 RATIO — HIGH (ref 0.88–1.16)
LACTATE BLDV-MCNC: 0.8 MMOL/L — SIGNIFICANT CHANGE UP (ref 0.7–2)
LACTATE BLDV-MCNC: 1 MMOL/L — SIGNIFICANT CHANGE UP (ref 0.7–2)
LACTATE BLDV-MCNC: 1.1 MMOL/L — SIGNIFICANT CHANGE UP (ref 0.7–2)
LACTATE BLDV-MCNC: 1.1 MMOL/L — SIGNIFICANT CHANGE UP (ref 0.7–2)
LYMPHOCYTES # BLD AUTO: 1.5 K/UL — SIGNIFICANT CHANGE UP (ref 1–3.3)
LYMPHOCYTES # BLD AUTO: 9 % — LOW (ref 13–44)
MAGNESIUM SERPL-MCNC: 2.7 MG/DL — HIGH (ref 1.6–2.6)
MCHC RBC-ENTMCNC: 31.4 PG — SIGNIFICANT CHANGE UP (ref 27–34)
MCHC RBC-ENTMCNC: 35.4 GM/DL — SIGNIFICANT CHANGE UP (ref 32–36)
MCV RBC AUTO: 88.6 FL — SIGNIFICANT CHANGE UP (ref 80–100)
MONOCYTES # BLD AUTO: 1.48 K/UL — HIGH (ref 0–0.9)
MONOCYTES NFR BLD AUTO: 8.8 % — SIGNIFICANT CHANGE UP (ref 2–14)
NEUTROPHILS # BLD AUTO: 13.58 K/UL — HIGH (ref 1.8–7.4)
NEUTROPHILS NFR BLD AUTO: 81 % — HIGH (ref 43–77)
NRBC # BLD: 0 /100 WBCS — SIGNIFICANT CHANGE UP (ref 0–0)
PCO2 BLDV: 42 MMHG — SIGNIFICANT CHANGE UP (ref 35–50)
PCO2 BLDV: 46 MMHG — SIGNIFICANT CHANGE UP (ref 35–50)
PCO2 BLDV: 46 MMHG — SIGNIFICANT CHANGE UP (ref 35–50)
PCO2 BLDV: 49 MMHG — SIGNIFICANT CHANGE UP (ref 35–50)
PH BLDV: 7.33 — LOW (ref 7.35–7.45)
PH BLDV: 7.34 — LOW (ref 7.35–7.45)
PH BLDV: 7.35 — SIGNIFICANT CHANGE UP (ref 7.35–7.45)
PH BLDV: 7.35 — SIGNIFICANT CHANGE UP (ref 7.35–7.45)
PHOSPHATE SERPL-MCNC: 2.5 MG/DL — SIGNIFICANT CHANGE UP (ref 2.5–4.5)
PLATELET # BLD AUTO: 307 K/UL — SIGNIFICANT CHANGE UP (ref 150–400)
PO2 BLDV: 62 MMHG — HIGH (ref 25–45)
PO2 BLDV: 63 MMHG — HIGH (ref 25–45)
PO2 BLDV: 65 MMHG — HIGH (ref 25–45)
PO2 BLDV: 66 MMHG — HIGH (ref 25–45)
POTASSIUM BLDV-SCNC: 4.3 MMOL/L — SIGNIFICANT CHANGE UP (ref 3.5–5)
POTASSIUM BLDV-SCNC: 4.5 MMOL/L — SIGNIFICANT CHANGE UP (ref 3.5–5)
POTASSIUM BLDV-SCNC: 4.7 MMOL/L — SIGNIFICANT CHANGE UP (ref 3.5–5)
POTASSIUM BLDV-SCNC: 5 MMOL/L — SIGNIFICANT CHANGE UP (ref 3.5–5)
POTASSIUM SERPL-MCNC: 4.4 MMOL/L — SIGNIFICANT CHANGE UP (ref 3.5–5.3)
POTASSIUM SERPL-SCNC: 4.4 MMOL/L — SIGNIFICANT CHANGE UP (ref 3.5–5.3)
PROT SERPL-MCNC: 5.2 G/DL — LOW (ref 6–8.3)
PROTHROM AB SERPL-ACNC: 14.7 SEC — HIGH (ref 10–12.9)
RBC # BLD: 2.71 M/UL — LOW (ref 4.2–5.8)
RBC # FLD: 14.6 % — HIGH (ref 10.3–14.5)
SAO2 % BLDV: 90 % — HIGH (ref 67–88)
SAO2 % BLDV: 91 % — HIGH (ref 67–88)
SODIUM SERPL-SCNC: 143 MMOL/L — SIGNIFICANT CHANGE UP (ref 135–145)
TROPONIN T, HIGH SENSITIVITY RESULT: 874 NG/L — HIGH (ref 0–51)
WBC # BLD: 16.75 K/UL — HIGH (ref 3.8–10.5)
WBC # FLD AUTO: 16.75 K/UL — HIGH (ref 3.8–10.5)

## 2019-10-02 PROCEDURE — 71045 X-RAY EXAM CHEST 1 VIEW: CPT | Mod: 26

## 2019-10-02 PROCEDURE — 33508 ENDOSCOPIC VEIN HARVEST: CPT

## 2019-10-02 PROCEDURE — 33521 CABG ARTERY-VEIN FOUR: CPT

## 2019-10-02 PROCEDURE — 99291 CRITICAL CARE FIRST HOUR: CPT

## 2019-10-02 PROCEDURE — 93010 ELECTROCARDIOGRAM REPORT: CPT

## 2019-10-02 PROCEDURE — 33533 CABG ARTERIAL SINGLE: CPT

## 2019-10-02 RX ORDER — CHLORHEXIDINE GLUCONATE 213 G/1000ML
5 SOLUTION TOPICAL EVERY 4 HOURS
Refills: 0 | Status: DISCONTINUED | OUTPATIENT
Start: 2019-10-02 | End: 2019-10-02

## 2019-10-02 RX ORDER — DEXMEDETOMIDINE HYDROCHLORIDE IN 0.9% SODIUM CHLORIDE 4 UG/ML
1 INJECTION INTRAVENOUS
Qty: 200 | Refills: 0 | Status: DISCONTINUED | OUTPATIENT
Start: 2019-10-02 | End: 2019-10-03

## 2019-10-02 RX ORDER — INSULIN HUMAN 100 [IU]/ML
3 INJECTION, SOLUTION SUBCUTANEOUS
Qty: 100 | Refills: 0 | Status: DISCONTINUED | OUTPATIENT
Start: 2019-10-02 | End: 2019-10-04

## 2019-10-02 RX ORDER — POTASSIUM CHLORIDE 20 MEQ
10 PACKET (EA) ORAL ONCE
Refills: 0 | Status: COMPLETED | OUTPATIENT
Start: 2019-10-02 | End: 2019-10-02

## 2019-10-02 RX ORDER — SODIUM CHLORIDE 9 MG/ML
1000 INJECTION INTRAMUSCULAR; INTRAVENOUS; SUBCUTANEOUS
Refills: 0 | Status: DISCONTINUED | OUTPATIENT
Start: 2019-10-02 | End: 2019-10-06

## 2019-10-02 RX ORDER — TRIHEXYPHENIDYL HCL 2 MG
2 TABLET ORAL THREE TIMES A DAY
Refills: 0 | Status: DISCONTINUED | OUTPATIENT
Start: 2019-10-02 | End: 2019-10-15

## 2019-10-02 RX ORDER — ASPIRIN/CALCIUM CARB/MAGNESIUM 324 MG
300 TABLET ORAL ONCE
Refills: 0 | Status: COMPLETED | OUTPATIENT
Start: 2019-10-02 | End: 2019-10-02

## 2019-10-02 RX ORDER — SODIUM CHLORIDE 9 MG/ML
500 INJECTION INTRAMUSCULAR; INTRAVENOUS; SUBCUTANEOUS ONCE
Refills: 0 | Status: COMPLETED | OUTPATIENT
Start: 2019-10-02 | End: 2019-10-02

## 2019-10-02 RX ORDER — POTASSIUM CHLORIDE 20 MEQ
10 PACKET (EA) ORAL
Refills: 0 | Status: DISCONTINUED | OUTPATIENT
Start: 2019-10-02 | End: 2019-10-03

## 2019-10-02 RX ORDER — METOCLOPRAMIDE HCL 10 MG
10 TABLET ORAL EVERY 8 HOURS
Refills: 0 | Status: DISCONTINUED | OUTPATIENT
Start: 2019-10-02 | End: 2019-10-03

## 2019-10-02 RX ORDER — CHLORHEXIDINE GLUCONATE 213 G/1000ML
1 SOLUTION TOPICAL
Refills: 0 | Status: DISCONTINUED | OUTPATIENT
Start: 2019-10-02 | End: 2019-10-15

## 2019-10-02 RX ORDER — PANTOPRAZOLE SODIUM 20 MG/1
40 TABLET, DELAYED RELEASE ORAL DAILY
Refills: 0 | Status: DISCONTINUED | OUTPATIENT
Start: 2019-10-02 | End: 2019-10-03

## 2019-10-02 RX ORDER — MEPERIDINE HYDROCHLORIDE 50 MG/ML
25 INJECTION INTRAMUSCULAR; INTRAVENOUS; SUBCUTANEOUS ONCE
Refills: 0 | Status: DISCONTINUED | OUTPATIENT
Start: 2019-10-02 | End: 2019-10-03

## 2019-10-02 RX ORDER — CEFUROXIME AXETIL 250 MG
1500 TABLET ORAL EVERY 8 HOURS
Refills: 0 | Status: COMPLETED | OUTPATIENT
Start: 2019-10-02 | End: 2019-10-04

## 2019-10-02 RX ORDER — METOCLOPRAMIDE HCL 10 MG
10 TABLET ORAL EVERY 8 HOURS
Refills: 0 | Status: DISCONTINUED | OUTPATIENT
Start: 2019-10-02 | End: 2019-10-02

## 2019-10-02 RX ORDER — HYDROMORPHONE HYDROCHLORIDE 2 MG/ML
0.5 INJECTION INTRAMUSCULAR; INTRAVENOUS; SUBCUTANEOUS ONCE
Refills: 0 | Status: DISCONTINUED | OUTPATIENT
Start: 2019-10-02 | End: 2019-10-02

## 2019-10-02 RX ORDER — SODIUM CHLORIDE 9 MG/ML
250 INJECTION INTRAMUSCULAR; INTRAVENOUS; SUBCUTANEOUS ONCE
Refills: 0 | Status: COMPLETED | OUTPATIENT
Start: 2019-10-02 | End: 2019-10-02

## 2019-10-02 RX ORDER — DEXTROSE 50 % IN WATER 50 %
25 SYRINGE (ML) INTRAVENOUS
Refills: 0 | Status: DISCONTINUED | OUTPATIENT
Start: 2019-10-02 | End: 2019-10-07

## 2019-10-02 RX ORDER — PROPOFOL 10 MG/ML
30 INJECTION, EMULSION INTRAVENOUS
Qty: 500 | Refills: 0 | Status: DISCONTINUED | OUTPATIENT
Start: 2019-10-02 | End: 2019-10-03

## 2019-10-02 RX ORDER — CEFUROXIME AXETIL 250 MG
1500 TABLET ORAL EVERY 8 HOURS
Refills: 0 | Status: DISCONTINUED | OUTPATIENT
Start: 2019-10-02 | End: 2019-10-02

## 2019-10-02 RX ORDER — ATORVASTATIN CALCIUM 80 MG/1
80 TABLET, FILM COATED ORAL AT BEDTIME
Refills: 0 | Status: DISCONTINUED | OUTPATIENT
Start: 2019-10-02 | End: 2019-10-15

## 2019-10-02 RX ORDER — MAGNESIUM SULFATE 500 MG/ML
2 VIAL (ML) INJECTION ONCE
Refills: 0 | Status: COMPLETED | OUTPATIENT
Start: 2019-10-02 | End: 2019-10-02

## 2019-10-02 RX ORDER — DOCUSATE SODIUM 100 MG
100 CAPSULE ORAL THREE TIMES A DAY
Refills: 0 | Status: DISCONTINUED | OUTPATIENT
Start: 2019-10-02 | End: 2019-10-15

## 2019-10-02 RX ORDER — FENTANYL CITRATE 50 UG/ML
50 INJECTION INTRAVENOUS ONCE
Refills: 0 | Status: DISCONTINUED | OUTPATIENT
Start: 2019-10-02 | End: 2019-10-02

## 2019-10-02 RX ORDER — CHLORHEXIDINE GLUCONATE 213 G/1000ML
15 SOLUTION TOPICAL EVERY 12 HOURS
Refills: 0 | Status: DISCONTINUED | OUTPATIENT
Start: 2019-10-02 | End: 2019-10-03

## 2019-10-02 RX ORDER — NICARDIPINE HYDROCHLORIDE 30 MG/1
3 CAPSULE, EXTENDED RELEASE ORAL
Qty: 40 | Refills: 0 | Status: DISCONTINUED | OUTPATIENT
Start: 2019-10-02 | End: 2019-10-03

## 2019-10-02 RX ORDER — CALCIUM GLUCONATE 100 MG/ML
1 VIAL (ML) INTRAVENOUS ONCE
Refills: 0 | Status: COMPLETED | OUTPATIENT
Start: 2019-10-02 | End: 2019-10-02

## 2019-10-02 RX ORDER — CARBIDOPA AND LEVODOPA 25; 100 MG/1; MG/1
1 TABLET ORAL THREE TIMES A DAY
Refills: 0 | Status: DISCONTINUED | OUTPATIENT
Start: 2019-10-02 | End: 2019-10-04

## 2019-10-02 RX ORDER — SODIUM CHLORIDE 9 MG/ML
600 INJECTION, SOLUTION INTRAVENOUS ONCE
Refills: 0 | Status: COMPLETED | OUTPATIENT
Start: 2019-10-02 | End: 2019-10-02

## 2019-10-02 RX ORDER — DEXTROSE 50 % IN WATER 50 %
50 SYRINGE (ML) INTRAVENOUS
Refills: 0 | Status: DISCONTINUED | OUTPATIENT
Start: 2019-10-02 | End: 2019-10-15

## 2019-10-02 RX ADMIN — Medication 50 MICROGRAM(S): at 04:23

## 2019-10-02 RX ADMIN — Medication 2 MILLIGRAM(S): at 04:23

## 2019-10-02 RX ADMIN — PANTOPRAZOLE SODIUM 40 MILLIGRAM(S): 20 TABLET, DELAYED RELEASE ORAL at 04:23

## 2019-10-02 RX ADMIN — Medication 100 MILLIGRAM(S): at 04:23

## 2019-10-02 RX ADMIN — MUPIROCIN 1 APPLICATION(S): 20 OINTMENT TOPICAL at 04:23

## 2019-10-02 RX ADMIN — Medication 2 MILLIGRAM(S): at 16:27

## 2019-10-02 RX ADMIN — CARBIDOPA AND LEVODOPA 1 TABLET(S): 25; 100 TABLET ORAL at 16:27

## 2019-10-02 RX ADMIN — CARBIDOPA AND LEVODOPA 1 TABLET(S): 25; 100 TABLET ORAL at 22:40

## 2019-10-02 RX ADMIN — SODIUM CHLORIDE 10 MILLILITER(S): 9 INJECTION INTRAMUSCULAR; INTRAVENOUS; SUBCUTANEOUS at 15:36

## 2019-10-02 RX ADMIN — PROPOFOL 19.57 MICROGRAM(S)/KG/MIN: 10 INJECTION, EMULSION INTRAVENOUS at 15:42

## 2019-10-02 RX ADMIN — NICARDIPINE HYDROCHLORIDE 15 MG/HR: 30 CAPSULE, EXTENDED RELEASE ORAL at 16:50

## 2019-10-02 RX ADMIN — Medication 200 GRAM(S): at 19:30

## 2019-10-02 RX ADMIN — Medication 50 GRAM(S): at 20:38

## 2019-10-02 RX ADMIN — CARBIDOPA AND LEVODOPA 1 TABLET(S): 25; 100 TABLET ORAL at 04:23

## 2019-10-02 RX ADMIN — INSULIN HUMAN 3 UNIT(S)/HR: 100 INJECTION, SOLUTION SUBCUTANEOUS at 19:00

## 2019-10-02 RX ADMIN — Medication 50 MILLIEQUIVALENT(S): at 20:30

## 2019-10-02 RX ADMIN — Medication 100 MILLIGRAM(S): at 23:28

## 2019-10-02 RX ADMIN — Medication 40 MILLIGRAM(S): at 04:41

## 2019-10-02 RX ADMIN — Medication 2 MILLIGRAM(S): at 22:40

## 2019-10-02 RX ADMIN — Medication 100 MILLIGRAM(S): at 16:38

## 2019-10-02 RX ADMIN — FENTANYL CITRATE 50 MICROGRAM(S): 50 INJECTION INTRAVENOUS at 23:28

## 2019-10-02 RX ADMIN — SODIUM CHLORIDE 1500 MILLILITER(S): 9 INJECTION INTRAMUSCULAR; INTRAVENOUS; SUBCUTANEOUS at 17:19

## 2019-10-02 RX ADMIN — INSULIN HUMAN 3 UNIT(S)/HR: 100 INJECTION, SOLUTION SUBCUTANEOUS at 16:30

## 2019-10-02 RX ADMIN — CHLORHEXIDINE GLUCONATE 15 MILLILITER(S): 213 SOLUTION TOPICAL at 17:43

## 2019-10-02 RX ADMIN — NYSTATIN CREAM 1 APPLICATION(S): 100000 CREAM TOPICAL at 04:22

## 2019-10-02 RX ADMIN — PANTOPRAZOLE SODIUM 40 MILLIGRAM(S): 20 TABLET, DELAYED RELEASE ORAL at 16:38

## 2019-10-02 RX ADMIN — SODIUM CHLORIDE 3 MILLILITER(S): 9 INJECTION INTRAMUSCULAR; INTRAVENOUS; SUBCUTANEOUS at 04:22

## 2019-10-02 RX ADMIN — Medication 10 MILLIGRAM(S): at 22:40

## 2019-10-02 RX ADMIN — FENTANYL CITRATE 50 MICROGRAM(S): 50 INJECTION INTRAVENOUS at 23:43

## 2019-10-02 RX ADMIN — CHLORHEXIDINE GLUCONATE 1 APPLICATION(S): 213 SOLUTION TOPICAL at 06:11

## 2019-10-02 RX ADMIN — Medication 50 MILLIEQUIVALENT(S): at 17:36

## 2019-10-02 RX ADMIN — HYDROMORPHONE HYDROCHLORIDE 0.5 MILLIGRAM(S): 2 INJECTION INTRAMUSCULAR; INTRAVENOUS; SUBCUTANEOUS at 17:35

## 2019-10-02 RX ADMIN — SODIUM CHLORIDE 1200 MILLILITER(S): 9 INJECTION, SOLUTION INTRAVENOUS at 17:19

## 2019-10-02 RX ADMIN — SODIUM CHLORIDE 3000 MILLILITER(S): 9 INJECTION INTRAMUSCULAR; INTRAVENOUS; SUBCUTANEOUS at 17:14

## 2019-10-02 RX ADMIN — DEXMEDETOMIDINE HYDROCHLORIDE IN 0.9% SODIUM CHLORIDE 27.18 MICROGRAM(S)/KG/HR: 4 INJECTION INTRAVENOUS at 15:55

## 2019-10-02 RX ADMIN — SODIUM CHLORIDE 500 MILLILITER(S): 9 INJECTION INTRAMUSCULAR; INTRAVENOUS; SUBCUTANEOUS at 18:40

## 2019-10-02 RX ADMIN — HYDROMORPHONE HYDROCHLORIDE 0.5 MILLIGRAM(S): 2 INJECTION INTRAMUSCULAR; INTRAVENOUS; SUBCUTANEOUS at 17:50

## 2019-10-02 NOTE — PROGRESS NOTE ADULT - PROVIDER SPECIALTY LIST ADULT
Nephrology Alert-The patient is alert, awake and responds to voice. The patient is oriented to time, place, and person. The triage nurse is able to obtain subjective information.

## 2019-10-02 NOTE — PROGRESS NOTE ADULT - ASSESSMENT
Assessment  DMT2: 69y Male with DM T2 with hyperglycemia, A1C 7.2%, was on oral meds and insulin at home, now on insulin, blood sugars trending within acceptable range, no hypoglycemic episode, NPO for CABG today.  Hypothyroidism: Patient has no hx thyroid dz, was not on any thyroid supplements, he denies neck swelling, neck pain, no hx neck surgery. Found incidentally with TSH 6.22 and was started on synthroid 50mcg PO daily.  CAD: CABG scheduled today, on medications, no chest pain, stable, monitored.  HTN: Controlled,  on antihypertensive medications.  Parkinson's: on meds, stable.          Kelsie Reece MD  Cell: [phone]  Office: [phone] Assessment  DMT2: 69y Male with DM T2 with hyperglycemia, A1C 7.2%, was on oral meds and insulin at home, now on insulin, blood sugars  trending within acceptable range, no hypoglycemic episode, NPO for CABG today.  Hypothyroidism: Patient has no hx thyroid dz, was not on any thyroid supplements, he denies neck swelling, neck pain, no hx neck surgery. Found incidentally with TSH 6.22 and was started on synthroid 50mcg PO daily.  CAD: CABG scheduled today, on medications, no chest pain, stable, monitored.  HTN: Controlled,  on antihypertensive medications.  Parkinson's: on meds, stable.          Kelsie Reece MD  Cell: [phone]  Office: [phone]

## 2019-10-02 NOTE — PROGRESS NOTE ADULT - SUBJECTIVE AND OBJECTIVE BOX
ORLIN HARDIN  MRN-[id number]  Patient is a 69y old  Male who presents with a chief complaint of Multivessel disease (02 Oct 2019 17:14)    HPI:  70 yo M PMHx CAD s/p stent x 1 (2010), T2DM, HTN, Parkinson's presenting with c/o 10/10 chest pain described as burning radiating from abdomen to midsternum x 1 day. Patient reports symptoms started at 2 pm and resolved after 1 hour. Chest pain began after he ate lunch and was walking up a flight of stairs. He reports chest pain was associated with dyspnea, diaphoresis, and nausea. At baseline, he ambulates with cane/walker. Takes medications daily including aspirin and prasugrel. On ROS, reports chronic left leg swelling x 1-2 years. Pt s/p cardiac cath at [facility] found to have multivessel disease requiring transfer to [facility] CT surgery service. Pt denies any chest pain, palpitations, SOB, N/V/D, fever, syncope, changes in vision, numbness/tingling or recent travel. (25 Sep 2019 23:44)      Surgery/Hospital course:    Today:    REVIEW OF SYSTEMS:  Gen: No fever  EYES/ENT: No visual changes;  No vertigo or throat pain   NECK: No pain   RES:  No shortness of breath or Cough  Chest: + incisional pain  CARD: No chest pain   GI: No abdominal pain  : No dysuria  NEURO: No weakness  SKIN: No itching, rashes     Physical Exam:  Vital Signs Last 24 Hrs  T(C): 37.3 (02 Oct 2019 19:00), Max: 37.3 (02 Oct 2019 18:00)  T(F): 99.1 (02 Oct 2019 19:00), Max: 99.1 (02 Oct 2019 18:00)  HR: 56 (02 Oct 2019 20:27) (56 - 72)  BP: 150/68 (02 Oct 2019 03:45) (150/68 - 161/73)  BP(mean): --  RR: 10 (02 Oct 2019 19:00) (10 - 21)  SpO2: 100% (02 Oct 2019 20:27) (97% - 100%)  Gen:  Awake, alert   CNS: non focal 	  Neck: no JVD  RES : clear , no wheezing    Chest:   + chest tubes                     CVS: Regular  rhythm. Normal S1/S2  Abd: Soft, non-distended. Bowel sounds present.  Skin: No rash.  Ext:  no edema, A Line  PSY:  ============================I/O===========================   I&O's Detail    01 Oct 2019 07:01  -  02 Oct 2019 07:00  --------------------------------------------------------  IN:    Oral Fluid: 1210 mL  Total IN: 1210 mL    OUT:    Voided: 1365 mL  Total OUT: 1365 mL    Total NET: -155 mL      02 Oct 2019 07:01  -  02 Oct 2019 20:33  --------------------------------------------------------  IN:    dexmedetomidine Infusion: 123 mL    Enteral Tube Flush: 60 mL    insulin regular Infusion: 7.5 mL    IV PiggyBack: 100 mL    Lactated Ringers IV Bolus: 600 mL    niCARdipine Infusion: 10 mL    propofol Infusion: 40 mL    Sodium Chloride 0.9% IV Bolus: 1000 mL    sodium chloride 0.9%.: 30 mL  Total IN: 1970.5 mL    OUT:    Bulb: 6 mL    Chest Tube: 130 mL    Chest Tube: 80 mL    Indwelling Catheter - Urethral: 1025 mL  Total OUT: 1241 mL    Total NET: 729.5 mL        ============================ LABS =========================                        8.5    16.75 )-----------( 307      ( 02 Oct 2019 16:22 )             24.0     10-02    143  |  103  |  35<H>  ----------------------------<  163<H>  4.4   |  23  |  2.23<H>    Ca    8.7      02 Oct 2019 16:22  Phos  2.5     10-02  Mg     2.7     10-02    TPro  5.2<L>  /  Alb  3.1<L>  /  TBili  0.8  /  DBili  x   /  AST  34  /  ALT  5<L>  /  AlkPhos  54  10-02    LIVER FUNCTIONS - ( 02 Oct 2019 16:22 )  Alb: 3.1 g/dL / Pro: 5.2 g/dL / ALK PHOS: 54 U/L / ALT: 5 U/L / AST: 34 U/L / GGT: x           PT/INR - ( 02 Oct 2019 16:22 )   PT: 14.7 sec;   INR: 1.27 ratio         PTT - ( 02 Oct 2019 16:22 )  PTT:25.9 sec  ABG - ( 02 Oct 2019 19:02 )  pH, Arterial: 7.40  pH, Blood: x     /  pCO2: 43    /  pO2: 151   / HCO3: 26    / Base Excess: 1.6   /  SaO2: 99                  ======================Micro/Rad/Cardio=================  Culture: Reviewed   CXR: Reviewed  Echo:Reviewed  ======================================================  PAST MEDICAL & SURGICAL HISTORY:  Shoulder pain, left  Osteoarthritis  Panic attacks  Urinary frequency  Urinary incontinence  Nocturia: x2-3  Gastroesophageal reflux disease without esophagitis: diet controlled  Vertigo: not improved with meclizine in past  Insomnia  Autoimmune disease: started on prednisone on 12/23/15 for &quot;inflammation&quot; but does not know diagnosis  Leg swelling: left leg  CAD (coronary artery disease): s/p 1 stent in 2010  Parkinson disease  Hypercholesterolemia  Diabetes Mellitus, Type 2  Hypertension  S/P angioplasty with stent: 1 stent, RCA  Status Post Cardiac Catheterization    ====================ASSESMENT ==============  CNS:  RES:  CVS:  Hem:  Pool:  GI:  Endo:  ID:  Skin  Plan:  ====================== NEUROLOGY=====================  carbidopa/levodopa  25/100 1 Tablet(s) Oral three times a day  dexmedetomidine Infusion 1 MICROgram(s)/kG/Hr (27.175 mL/Hr) IV Continuous <Continuous>  meperidine     Injectable 25 milliGRAM(s) IV Push once  metoclopramide Injectable 10 milliGRAM(s) IV Push every 8 hours  propofol Infusion 30 MICROgram(s)/kG/Min (19.566 mL/Hr) IV Continuous <Continuous>  trihexyphenidyl 2 milliGRAM(s) Oral three times a day    ==================== RESPIRATORY======================  Mechanical Ventilation:  Mode: AC/ CMV (Assist Control/ Continuous Mandatory Ventilation)  RR (machine): 10  TV (machine): 700  FiO2: 50  PEEP: 5    ====================CARDIOVASCULAR==================  niCARdipine Infusion 3 mG/Hr (15 mL/Hr) IV Continuous <Continuous>    ===================HEMATOLOGIC/ONC ===================  blood transfusion 1 PRBC; f/u H  ===================== RENAL =========================  Javier for monitoring urine output    ==================== GASTROINTESTINAL===================  calcium gluconate IVPB 1 Gram(s) IV Intermittent once  docusate sodium 100 milliGRAM(s) Oral three times a day  magnesium sulfate  IVPB 2 Gram(s) IV Intermittent once  pantoprazole  Injectable 40 milliGRAM(s) IV Push daily  potassium chloride  10 mEq/100 mL IVPB 10 milliEquivalent(s) IV Intermittent once  potassium chloride  10 mEq/50 mL IVPB 10 milliEquivalent(s) IV Intermittent every 1 hour  potassium chloride  10 mEq/50 mL IVPB 10 milliEquivalent(s) IV Intermittent every 1 hour  potassium chloride  10 mEq/50 mL IVPB 10 milliEquivalent(s) IV Intermittent every 1 hour  sodium chloride 0.9%. 1000 milliLiter(s) (10 mL/Hr) IV Continuous <Continuous>    =======================    ENDOCRINE  =====================  atorvastatin 80 milliGRAM(s) Oral at bedtime  insulin regular Infusion 3 Unit(s)/Hr (3 mL/Hr) IV Continuous <Continuous>    ========================INFECTIOUS DISEASE================  cefuroxime  IVPB 1500 milliGRAM(s) IV Intermittent every 8 hours    Patient requires continuous monitoring with bedside rhythm monitoring,arterial line,pulse oximetry,ventilator monitoring;intermittent blood gas analysis.  Care plan discussed with ICU care team.  patient remain critical; required more than usual post op care; I have spent 35 minutes providing non routine post op care. ORLIN HARDIN  MRN-[id number]  Patient is a 69y old  Male who presents with a chief complaint of Multivessel disease (02 Oct 2019 17:14)    HPI:  70 yo M PMHx CAD s/p stent x 1 (2010), T2DM, HTN, Parkinson's presenting with c/o 10/10 chest pain described as burning radiating from abdomen to midsternum x 1 day. Patient reports symptoms started at 2 pm and resolved after 1 hour. Chest pain began after he ate lunch and was walking up a flight of stairs. He reports chest pain was associated with dyspnea, diaphoresis, and nausea. At baseline, he ambulates with cane/walker. Takes medications daily including aspirin and prasugrel. On ROS, reports chronic left leg swelling x 1-2 years. Pt s/p cardiac cath at [facility] found to have multivessel disease requiring transfer to [facility] CT surgery service. Pt denies any chest pain, palpitations, SOB, N/V/D, fever, syncope, changes in vision, numbness/tingling or recent travel. (25 Sep 2019 23:44)      Surgery/Hospital course:  10/2/2019 CABG C5L     transferred from OR; intubated full vent support.  iv infusion of precedex, diprivan, insuline, on off cardene.      Physical Exam:  Vital Signs Last 24 Hrs  T(C): 37.3 (02 Oct 2019 19:00), Max: 37.3 (02 Oct 2019 18:00)  T(F): 99.1 (02 Oct 2019 19:00), Max: 99.1 (02 Oct 2019 18:00)  HR: 56 (02 Oct 2019 20:27) (56 - 72)  BP: 150/68 (02 Oct 2019 03:45) (150/68 - 161/73)  BP(mean): --  RR: 10 (02 Oct 2019 19:00) (10 - 21)  SpO2: 100% (02 Oct 2019 20:27) (97% - 100%)  Gen:  intubated   CNS: sedated	  Neck: no JVD  RES : clear , no wheezing    Chest:   + chest tubes                     CVS: Regular  rhythm. Normal S1/S2  Abd: Soft, non-distended. Bowel sounds present.  Skin: No rash.  Ext:  no edema, A Line  ============================I/O===========================   I&O's Detail    01 Oct 2019 07:01  -  02 Oct 2019 07:00  --------------------------------------------------------  IN:    Oral Fluid: 1210 mL  Total IN: 1210 mL    OUT:    Voided: 1365 mL  Total OUT: 1365 mL    Total NET: -155 mL      02 Oct 2019 07:01  -  02 Oct 2019 20:33  --------------------------------------------------------  IN:    dexmedetomidine Infusion: 123 mL    Enteral Tube Flush: 60 mL    insulin regular Infusion: 7.5 mL    IV PiggyBack: 100 mL    Lactated Ringers IV Bolus: 600 mL    niCARdipine Infusion: 10 mL    propofol Infusion: 40 mL    Sodium Chloride 0.9% IV Bolus: 1000 mL    sodium chloride 0.9%.: 30 mL  Total IN: 1970.5 mL    OUT:    Bulb: 6 mL    Chest Tube: 130 mL    Chest Tube: 80 mL    Indwelling Catheter - Urethral: 1025 mL  Total OUT: 1241 mL    Total NET: 729.5 mL  ============================ LABS =========================                        8.5    16.75 )-----------( 307      ( 02 Oct 2019 16:22 )             24.0     143  |  103  |  35<H>  ----------------------------<  163<H>  4.4   |  23  |  2.23<H>    Ca    8.7      02 Oct 2019 16:22  Phos  2.5     10-02  Mg     2.7     10-02  TPro  5.2<L>  /  Alb  3.1<L>  /  TBili  0.8  /  DBili  x   /  AST  34  /  ALT  5<L>  /  AlkPhos  54  10-02  LIVER FUNCTIONS - ( 02 Oct 2019 16:22 )Alb: 3.1 g/dL / Pro: 5.2 g/dL / ALK PHOS: 54 U/L / ALT: 5 U/L / AST: 34 U/L / GGT: x           PT/INR - ( 02 Oct 2019 16:22 )   PT: 14.7 sec;   INR: 1.27 ratio  PTT - ( 02 Oct 2019 16:22 )  PTT:25.9 sec  ABG - ( 02 Oct 2019 19:02 )  pH, Arterial: 7.40  pH, Blood: x     /  pCO2: 43    /  pO2: 151   / HCO3: 26    / Base Excess: 1.6   /  SaO2: 99          CXR: Reviewed  Echo:Reviewed  ======================================================  PAST MEDICAL & SURGICAL HISTORY:  Shoulder pain, left  Osteoarthritis  Panic attacks  Urinary frequency  Urinary incontinence  Nocturia: x2-3  Gastroesophageal reflux disease without esophagitis: diet controlled  Vertigo: not improved with meclizine in past  Insomnia  Autoimmune disease: started on prednisone on 12/23/15 for &quot;inflammation&quot; but does not know diagnosis  Leg swelling: left leg  CAD (coronary artery disease): s/p 1 stent in 2010  Parkinson disease  Hypercholesterolemia  Diabetes Mellitus, Type 2  Hypertension  S/P angioplasty with stent: 1 stent, RCA  Status Post Cardiac Catheterization    ====================ASSESMENT ==============  10/2/2019 CABG C5L   ASHD/CAd  Hypertension  Hyperglycemia  Parkinson disease  Hypercholesterolemia  Diabetes Mellitus, Type 2      Plan:  ====================== NEUROLOGY=====================  carbidopa/levodopa  25/100 1 Tablet(s) Oral three times a day  dexmedetomidine Infusion 1 MICROgram(s)/kG/Hr (27.175 mL/Hr) IV Continuous <Continuous>  meperidine     Injectable 25 milliGRAM(s) IV Push once  metoclopramide Injectable 10 milliGRAM(s) IV Push every 8 hours  propofol Infusion 30 MICROgram(s)/kG/Min (19.566 mL/Hr) IV Continuous <Continuous>  trihexyphenidyl 2 milliGRAM(s) Oral three times a day    ==================== RESPIRATORY======================  Mechanical Ventilation:  Mode: AC/ CMV (Assist Control/ Continuous Mandatory Ventilation)  RR (machine): 10  TV (machine): 700  FiO2: 50  PEEP: 5  wean to extubate as tolerated  ====================CARDIOVASCULAR==================  niCARdipine Infusion 3 mG/Hr (15 mL/Hr) IV Continuous <Continuous>  titrate for goal BP mean 75-85  ===================HEMATOLOGIC/ONC ===================  blood transfusion 1 PRBC; f/u H/H  revaluate in  AM for starting Aspirin  ===================== RENAL =========================  Javier for monitoring urine output    ==================== GASTROINTESTINAL===================  calcium gluconate IVPB 1 Gram(s) IV Intermittent once  docusate sodium 100 milliGRAM(s) Oral three times a day  magnesium sulfate  IVPB 2 Gram(s) IV Intermittent once  pantoprazole  Injectable 40 milliGRAM(s) IV Push daily  potassium chloride  10 mEq/100 mL IVPB 10 milliEquivalent(s) IV Intermittent once  potassium chloride  10 mEq/50 mL IVPB 10 milliEquivalent(s) IV Intermittent every 1 hour  potassium chloride  10 mEq/50 mL IVPB 10 milliEquivalent(s) IV Intermittent every 1 hour  potassium chloride  10 mEq/50 mL IVPB 10 milliEquivalent(s) IV Intermittent every 1 hour  sodium chloride 0.9%. 1000 milliLiter(s) (10 mL/Hr) IV Continuous <Continuous>    =======================    ENDOCRINE  =====================  atorvastatin 80 milliGRAM(s) Oral at bedtime  insulin regular Infusion 3 Unit(s)/Hr (3 mL/Hr) IV Continuous <Continuous>    ========================INFECTIOUS DISEASE================  cefuroxime  IVPB 1500 milliGRAM(s) IV Intermittent every 8 hours    Patient requires continuous monitoring with bedside rhythm monitoring,arterial line,pulse oximetry,ventilator monitoring;intermittent blood gas analysis.  Care plan discussed with ICU care team.  patient remain critical; required more than usual post op care; I have spent 35 minutes providing non routine post op care. ORLIN HARDIN  MRN-[id number]  Patient is a 69y old  Male who presents with a chief complaint of Multivessel disease (02 Oct 2019 17:14)    HPI:  68 yo M PMHx CAD s/p stent x 1 (2010), T2DM, HTN, Parkinson's presenting with c/o 10/10 chest pain described as burning radiating from abdomen to midsternum x 1 day. Patient reports symptoms started at 2 pm and resolved after 1 hour. Chest pain began after he ate lunch and was walking up a flight of stairs. He reports chest pain was associated with dyspnea, diaphoresis, and nausea. At baseline, he ambulates with cane/walker. Takes medications daily including aspirin and prasugrel. On ROS, reports chronic left leg swelling x 1-2 years. Pt s/p cardiac cath at [facility] found to have multivessel disease requiring transfer to [facility] CT surgery service. Pt denies any chest pain, palpitations, SOB, N/V/D, fever, syncope, changes in vision, numbness/tingling or recent travel. (25 Sep 2019 23:44)      Surgery/Hospital course:  10/2/2019 CABG C5L     transferred from OR; intubated full vent support.  iv infusion of precedex, diprivan, insuline, on off cardene.      Physical Exam:  Vital Signs Last 24 Hrs  T(C): 37.3 (02 Oct 2019 19:00), Max: 37.3 (02 Oct 2019 18:00)  T(F): 99.1 (02 Oct 2019 19:00), Max: 99.1 (02 Oct 2019 18:00)  HR: 56 (02 Oct 2019 20:27) (56 - 72)  BP: 150/68 (02 Oct 2019 03:45) (150/68 - 161/73)  BP(mean): --  RR: 10 (02 Oct 2019 19:00) (10 - 21)  SpO2: 100% (02 Oct 2019 20:27) (97% - 100%)  Gen:  intubated   CNS: sedated	  Neck: no JVD  RES : clear , no wheezing    Chest:   + chest tubes                     CVS: Regular  rhythm. Normal S1/S2  Abd: Soft, non-distended. Bowel sounds present.  Skin: No rash.  Ext:  no edema, A Line  ============================I/O===========================   I&O's Detail    01 Oct 2019 07:01  -  02 Oct 2019 07:00  --------------------------------------------------------  IN:    Oral Fluid: 1210 mL  Total IN: 1210 mL    OUT:    Voided: 1365 mL  Total OUT: 1365 mL    Total NET: -155 mL      02 Oct 2019 07:01  -  02 Oct 2019 20:33  --------------------------------------------------------  IN:    dexmedetomidine Infusion: 123 mL    Enteral Tube Flush: 60 mL    insulin regular Infusion: 7.5 mL    IV PiggyBack: 100 mL    Lactated Ringers IV Bolus: 600 mL    niCARdipine Infusion: 10 mL    propofol Infusion: 40 mL    Sodium Chloride 0.9% IV Bolus: 1000 mL    sodium chloride 0.9%.: 30 mL  Total IN: 1970.5 mL    OUT:    Bulb: 6 mL    Chest Tube: 130 mL    Chest Tube: 80 mL    Indwelling Catheter - Urethral: 1025 mL  Total OUT: 1241 mL    Total NET: 729.5 mL  ============================ LABS =========================                        8.5    16.75 )-----------( 307      ( 02 Oct 2019 16:22 )             24.0     143  |  103  |  35<H>  ----------------------------<  163<H>  4.4   |  23  |  2.23<H>    Ca    8.7      02 Oct 2019 16:22  Phos  2.5     10-02  Mg     2.7     10-02  TPro  5.2<L>  /  Alb  3.1<L>  /  TBili  0.8  /  DBili  x   /  AST  34  /  ALT  5<L>  /  AlkPhos  54  10-02  LIVER FUNCTIONS - ( 02 Oct 2019 16:22 )Alb: 3.1 g/dL / Pro: 5.2 g/dL / ALK PHOS: 54 U/L / ALT: 5 U/L / AST: 34 U/L / GGT: x           PT/INR - ( 02 Oct 2019 16:22 )   PT: 14.7 sec;   INR: 1.27 ratio  PTT - ( 02 Oct 2019 16:22 )  PTT:25.9 sec  ABG - ( 02 Oct 2019 19:02 )  pH, Arterial: 7.40  pH, Blood: x     /  pCO2: 43    /  pO2: 151   / HCO3: 26    / Base Excess: 1.6   /  SaO2: 99          CXR: Reviewed  Echo:Reviewed  ======================================================  PAST MEDICAL & SURGICAL HISTORY:  Shoulder pain, left  Osteoarthritis  Panic attacks  Urinary frequency  Urinary incontinence  Nocturia: x2-3  Gastroesophageal reflux disease without esophagitis: diet controlled  Vertigo: not improved with meclizine in past  Insomnia  Autoimmune disease: started on prednisone on 12/23/15 for &quot;inflammation&quot; but does not know diagnosis  Leg swelling: left leg  CAD (coronary artery disease): s/p 1 stent in 2010  Parkinson disease  Hypercholesterolemia  Diabetes Mellitus, Type 2  Hypertension  S/P angioplasty with stent: 1 stent, RCA  Status Post Cardiac Catheterization    ====================ASSESMENT ==============  10/2/2019 CABG C5L   ASHD/CAd  Hypertensive urgency  anemia blood loss  Hyperglycemia  Parkinson's  disease  Hypercholesterolemia  Diabetes Mellitus, Type 2      Plan:  ====================== NEUROLOGY=====================  carbidopa/levodopa  25/100 1 Tablet(s) Oral three times a day  dexmedetomidine Infusion 1 MICROgram(s)/kG/Hr (27.175 mL/Hr) IV Continuous <Continuous>  meperidine     Injectable 25 milliGRAM(s) IV Push once  metoclopramide Injectable 10 milliGRAM(s) IV Push every 8 hours  propofol Infusion 30 MICROgram(s)/kG/Min (19.566 mL/Hr) IV Continuous <Continuous>  trihexyphenidyl 2 milliGRAM(s) Oral three times a day    ==================== RESPIRATORY======================  Mechanical Ventilation:  Mode: AC/ CMV (Assist Control/ Continuous Mandatory Ventilation)  RR (machine): 10  TV (machine): 700  FiO2: 50  PEEP: 5  wean to extubate as tolerated  ====================CARDIOVASCULAR==================  niCARdipine Infusion 3 mG/Hr (15 mL/Hr) IV Continuous <Continuous>  titrate for goal BP mean 75-85  ===================HEMATOLOGIC/ONC ===================  blood transfusion 1 PRBC; f/u H/H  revaluate in  AM for starting Aspirin  ===================== RENAL =========================  Javier for monitoring urine output    ==================== GASTROINTESTINAL===================  calcium gluconate IVPB 1 Gram(s) IV Intermittent once  docusate sodium 100 milliGRAM(s) Oral three times a day  magnesium sulfate  IVPB 2 Gram(s) IV Intermittent once  pantoprazole  Injectable 40 milliGRAM(s) IV Push daily  potassium chloride  10 mEq/100 mL IVPB 10 milliEquivalent(s) IV Intermittent once  potassium chloride  10 mEq/50 mL IVPB 10 milliEquivalent(s) IV Intermittent every 1 hour  potassium chloride  10 mEq/50 mL IVPB 10 milliEquivalent(s) IV Intermittent every 1 hour  potassium chloride  10 mEq/50 mL IVPB 10 milliEquivalent(s) IV Intermittent every 1 hour  sodium chloride 0.9%. 1000 milliLiter(s) (10 mL/Hr) IV Continuous <Continuous>    =======================    ENDOCRINE  =====================  atorvastatin 80 milliGRAM(s) Oral at bedtime  insulin regular Infusion 3 Unit(s)/Hr (3 mL/Hr) IV Continuous <Continuous>    ========================INFECTIOUS DISEASE================  cefuroxime  IVPB 1500 milliGRAM(s) IV Intermittent every 8 hours    Patient requires continuous monitoring with bedside rhythm monitoring,arterial line,pulse oximetry,ventilator monitoring;intermittent blood gas analysis.  Care plan discussed with ICU care team.  patient remain critical; required more than usual post op care; I have spent 35 minutes providing non routine post op care.

## 2019-10-02 NOTE — PROGRESS NOTE ADULT - PROBLEM SELECTOR PLAN 1
Will continue current insulin regimen for now. Will continue monitoring FS, log, will Follow up.  Once postop, patient will be on insulin drip; Will d/c and start on basal bolus insulin when eating full meals.

## 2019-10-02 NOTE — PROGRESS NOTE ADULT - ASSESSMENT
68 yo M PMHx CAD s/p stent x 1 (2010), T2DM, HTN, Parkinson's and CKD stage 3b presents with chest pain. S/P cath with MVD-awaiting CABG  Subnephrotic range proteinuria     1. Renal - [id number].  Trend BMP and urine output(which is great).    Creatinine will increase in am;  No need for diuretics    2. CVS  IV lab to given for low CVP.  Not on pressors     3. Pulm - Wean to extubated 70 yo M PMHx CAD s/p stent x 1 (2010), T2DM, HTN, Parkinson's and CKD stage 3b presents with chest pain. S/P cath with MVD-POD #0  CABG  Subnephrotic range proteinuria     1. Renal - [id number].  Trend BMP and urine output(which is great).    Creatinine will increase in am;  No need for diuretics    2. CVS  IV lab to given for low CVP.  Not on pressors     3. Pulm - Wean to extubated

## 2019-10-02 NOTE — BRIEF OPERATIVE NOTE - NSICDXBRIEFPROCEDURE_GEN_ALL_CORE_FT
PROCEDURES:  CABG, using internal mammary artery 02-Oct-2019 19:14:38 C5L LIMA to LAD, SVG to RPDA, SVG to RPL, SVG to Ramus, SVG to Nona Welch

## 2019-10-02 NOTE — PROGRESS NOTE ADULT - PROBLEM SELECTOR PLAN 2
New TFTs: TSH 6.22, Free thyroxine 1.3.  Will continue current Synthroid dose, suggest to repeat TFTs in 6 weeks.

## 2019-10-02 NOTE — PROGRESS NOTE ADULT - SUBJECTIVE AND OBJECTIVE BOX
[facility] [phone]        Patient seen and examined in bed.  He is intubated and sedated  On cardene        MEDICATIONS  (STANDING):  atorvastatin 80 milliGRAM(s) Oral at bedtime  carbidopa/levodopa  25/100 1 Tablet(s) Oral three times a day  cefuroxime  IVPB 1500 milliGRAM(s) IV Intermittent every 8 hours  chlorhexidine 0.12% Liquid 15 milliLiter(s) Oral Mucosa every 12 hours  chlorhexidine 2% Cloths 1 Application(s) Topical <User Schedule>  dexmedetomidine Infusion 1 MICROgram(s)/kG/Hr (27.175 mL/Hr) IV Continuous <Continuous>  dextrose 50% Injectable 50 milliLiter(s) IV Push every 15 minutes  dextrose 50% Injectable 25 milliLiter(s) IV Push every 15 minutes  docusate sodium 100 milliGRAM(s) Oral three times a day  HYDROmorphone  Injectable 0.5 milliGRAM(s) IV Push once  insulin regular Infusion 3 Unit(s)/Hr (3 mL/Hr) IV Continuous <Continuous>  meperidine     Injectable 25 milliGRAM(s) IV Push once  metoclopramide Injectable 10 milliGRAM(s) IV Push every 8 hours  niCARdipine Infusion 3 mG/Hr (15 mL/Hr) IV Continuous <Continuous>  pantoprazole  Injectable 40 milliGRAM(s) IV Push daily  potassium chloride  10 mEq/50 mL IVPB 10 milliEquivalent(s) IV Intermittent once  potassium chloride  10 mEq/50 mL IVPB 10 milliEquivalent(s) IV Intermittent every 1 hour  potassium chloride  10 mEq/50 mL IVPB 10 milliEquivalent(s) IV Intermittent every 1 hour  potassium chloride  10 mEq/50 mL IVPB 10 milliEquivalent(s) IV Intermittent every 1 hour  propofol Infusion 30 MICROgram(s)/kG/Min (19.566 mL/Hr) IV Continuous <Continuous>  sodium chloride 0.9% Bolus 500 milliLiter(s) IV Bolus once  sodium chloride 0.9%. 1000 milliLiter(s) (10 mL/Hr) IV Continuous <Continuous>  trihexyphenidyl 2 milliGRAM(s) Oral three times a day      VITAL:  T(C): , Max: 36.9 (10-02-19 @ 03:45)  T(F): , Max: 98.4 (10-02-19 @ 03:45)  HR: 63 (10-02-19 @ 17:08)  BP: 150/68 (10-02-19 @ 03:45)  BP(mean): --  RR: 10 (10-02-19 @ 17:08)  SpO2: 99% (10-02-19 @ 17:08)  Wt(kg): --    I and O's:    10-01 @ 07:01  -  10-02 @ 07:00  --------------------------------------------------------  IN: 1210 mL / OUT: 1365 mL / NET: -155 mL    10-02 @ 07:01  -  10-02 @ 17:14  --------------------------------------------------------  IN: 123 mL / OUT: 0 mL / NET: 123 mL      Height (cm): 180.34 (10-02 @ 08:25)  Weight (kg): 108.7 (10-02 @ 08:25)  BMI (kg/m2): 33.4 (10-02 @ 08:25)  BSA (m2): 2.28 (10-02 @ 08:25)    PHYSICAL EXAM:    Constitutional: intubated   Neck:  No JVD  Respiratory: transmitted   Cardiovascular: S1 and S2  Gastrointestinal: BS+, soft, NT/ND  Extremities: No peripheral edema  Neurological:unable   : No Javier  Skin: No rashes  Access: Not applicable    LABS:                        8.5    16.75 )-----------( 307      ( 02 Oct 2019 16:22 )             24.0     10-02    143  |  103  |  35<H>  ----------------------------<  163<H>  4.4   |  23  |  2.23<H>    Ca    8.7      02 Oct 2019 16:22  Phos  2.5     10-02  Mg     2.7     10-02    TPro  5.2<L>  /  Alb  3.1<L>  /  TBili  0.8  /  DBili  x   /  AST  34  /  ALT  5<L>  /  AlkPhos  54  10-02          Urine Studies:          RADIOLOGY & ADDITIONAL STUDIES:

## 2019-10-02 NOTE — PROGRESS NOTE ADULT - SUBJECTIVE AND OBJECTIVE BOX
Chief complaint  Patient is a 69y old  Male who presents with a chief complaint of Multivessel disease (01 Oct 2019 15:50)   Review of systems  Patient in bed, looks comfortable, no fever, no hypoglycemia.    Labs and Fingersticks  CAPILLARY BLOOD GLUCOSE      POCT Blood Glucose.: 155 mg/dL (01 Oct 2019 22:32)  POCT Blood Glucose.: 131 mg/dL (01 Oct 2019 17:16)      Anion Gap, Serum: 13 (10-01 @ 06:54)      Calcium, Total Serum: 10.0 (10-01 @ 06:54)          10-01    137  |  99  |  44<H>  ----------------------------<  112<H>  3.8   |  25  |  2.70<H>    Ca    10.0      01 Oct 2019 06:54                          11.1   9.5   )-----------( 231      ( 01 Oct 2019 06:54 )             34.2     Medications  MEDICATIONS  (STANDING):      Physical Exam  General: Patient comfortable in bed  Vital Signs Last 12 Hrs  T(F): 98.4 (10-02-19 @ 03:45), Max: 98.4 (10-02-19 @ 03:45)  HR: 65 (10-02-19 @ 03:45) (65 - 65)  BP: 150/68 (10-02-19 @ 03:45) (150/68 - 150/68)  BP(mean): --  RR: 18 (10-02-19 @ 03:45) (18 - 18)  SpO2: 97% (10-02-19 @ 03:45) (97% - 97%)  Neck: No palpable thyroid nodules.  CVS: S1S2, No murmurs  Respiratory: No wheezing, no crepitations  GI: Abdomen soft, bowel sounds positive  Musculoskeletal:  edema lower extremities.   Skin: No skin rashes, no ecchymosis    Diagnostics    Free Thyroxine, Serum: AM Sched. Collection: 30-Sep-2019 06:00 (09-29 @ 10:47) Chief complaint  Patient is a 69y old  Male who presents with a chief complaint of Multivessel disease (01 Oct 2019 15:50)   Review of systems  Patient in bed, looks comfortable, no fever, no  hypoglycemia.    Labs and Fingersticks  CAPILLARY BLOOD GLUCOSE      POCT Blood Glucose.: 155 mg/dL (01 Oct 2019 22:32)  POCT Blood Glucose.: 131 mg/dL (01 Oct 2019 17:16)      Anion Gap, Serum: 13 (10-01 @ 06:54)      Calcium, Total Serum: 10.0 (10-01 @ 06:54)          10-01    137  |  99  |  44<H>  ----------------------------<  112<H>  3.8   |  25  |  2.70<H>    Ca    10.0      01 Oct 2019 06:54                          11.1   9.5   )-----------( 231      ( 01 Oct 2019 06:54 )             34.2     Medications  MEDICATIONS  (STANDING):      Physical Exam  General: Patient comfortable in bed  Vital Signs Last 12 Hrs  T(F): 98.4 (10-02-19 @ 03:45), Max: 98.4 (10-02-19 @ 03:45)  HR: 65 (10-02-19 @ 03:45) (65 - 65)  BP: 150/68 (10-02-19 @ 03:45) (150/68 - 150/68)  BP(mean): --  RR: 18 (10-02-19 @ 03:45) (18 - 18)  SpO2: 97% (10-02-19 @ 03:45) (97% - 97%)  Neck: No palpable thyroid nodules.  CVS: S1S2, No murmurs  Respiratory: No wheezing, no crepitations  GI: Abdomen soft, bowel sounds positive  Musculoskeletal:  edema lower extremities.   Skin: No skin rashes, no ecchymosis    Diagnostics    Free Thyroxine, Serum: AM Sched. Collection: 30-Sep-2019 06:00 (09-29 @ 10:47)

## 2019-10-03 LAB
ALBUMIN SERPL ELPH-MCNC: 3.2 G/DL — LOW (ref 3.3–5)
ALP SERPL-CCNC: 56 U/L — SIGNIFICANT CHANGE UP (ref 40–120)
ALT FLD-CCNC: 5 U/L — LOW (ref 10–45)
ANION GAP SERPL CALC-SCNC: 12 MMOL/L — SIGNIFICANT CHANGE UP (ref 5–17)
APTT BLD: 28.3 SEC — SIGNIFICANT CHANGE UP (ref 27.5–36.3)
AST SERPL-CCNC: 45 U/L — HIGH (ref 10–40)
BILIRUB SERPL-MCNC: 0.9 MG/DL — SIGNIFICANT CHANGE UP (ref 0.2–1.2)
BUN SERPL-MCNC: 35 MG/DL — HIGH (ref 7–23)
CALCIUM SERPL-MCNC: 8.8 MG/DL — SIGNIFICANT CHANGE UP (ref 8.4–10.5)
CHLORIDE SERPL-SCNC: 107 MMOL/L — SIGNIFICANT CHANGE UP (ref 96–108)
CO2 SERPL-SCNC: 24 MMOL/L — SIGNIFICANT CHANGE UP (ref 22–31)
CREAT SERPL-MCNC: 2.61 MG/DL — HIGH (ref 0.5–1.3)
GAS PNL BLDA: SIGNIFICANT CHANGE UP
GLUCOSE SERPL-MCNC: 104 MG/DL — HIGH (ref 70–99)
HCT VFR BLD CALC: 29.1 % — LOW (ref 39–50)
HGB BLD-MCNC: 10.3 G/DL — LOW (ref 13–17)
INR BLD: 1.09 RATIO — SIGNIFICANT CHANGE UP (ref 0.88–1.16)
MAGNESIUM SERPL-MCNC: 3.1 MG/DL — HIGH (ref 1.6–2.6)
MCHC RBC-ENTMCNC: 31.4 PG — SIGNIFICANT CHANGE UP (ref 27–34)
MCHC RBC-ENTMCNC: 35.4 GM/DL — SIGNIFICANT CHANGE UP (ref 32–36)
MCV RBC AUTO: 88.7 FL — SIGNIFICANT CHANGE UP (ref 80–100)
NRBC # BLD: 0 /100 WBCS — SIGNIFICANT CHANGE UP (ref 0–0)
PHOSPHATE SERPL-MCNC: 1.9 MG/DL — LOW (ref 2.5–4.5)
PLATELET # BLD AUTO: 311 K/UL — SIGNIFICANT CHANGE UP (ref 150–400)
POTASSIUM SERPL-MCNC: 4.3 MMOL/L — SIGNIFICANT CHANGE UP (ref 3.5–5.3)
POTASSIUM SERPL-SCNC: 4.3 MMOL/L — SIGNIFICANT CHANGE UP (ref 3.5–5.3)
PROT SERPL-MCNC: 5.7 G/DL — LOW (ref 6–8.3)
PROTHROM AB SERPL-ACNC: 12.6 SEC — SIGNIFICANT CHANGE UP (ref 10–12.9)
RBC # BLD: 3.28 M/UL — LOW (ref 4.2–5.8)
RBC # FLD: 14.6 % — HIGH (ref 10.3–14.5)
SODIUM SERPL-SCNC: 143 MMOL/L — SIGNIFICANT CHANGE UP (ref 135–145)
WBC # BLD: 15.47 K/UL — HIGH (ref 3.8–10.5)
WBC # FLD AUTO: 15.47 K/UL — HIGH (ref 3.8–10.5)

## 2019-10-03 PROCEDURE — 71045 X-RAY EXAM CHEST 1 VIEW: CPT | Mod: 26

## 2019-10-03 PROCEDURE — 93010 ELECTROCARDIOGRAM REPORT: CPT | Mod: 76

## 2019-10-03 PROCEDURE — 99291 CRITICAL CARE FIRST HOUR: CPT

## 2019-10-03 RX ORDER — FENTANYL CITRATE 50 UG/ML
50 INJECTION INTRAVENOUS ONCE
Refills: 0 | Status: DISCONTINUED | OUTPATIENT
Start: 2019-10-03 | End: 2019-10-03

## 2019-10-03 RX ORDER — DEXTROSE 50 % IN WATER 50 %
25 SYRINGE (ML) INTRAVENOUS ONCE
Refills: 0 | Status: DISCONTINUED | OUTPATIENT
Start: 2019-10-03 | End: 2019-10-15

## 2019-10-03 RX ORDER — PANTOPRAZOLE SODIUM 20 MG/1
40 TABLET, DELAYED RELEASE ORAL
Refills: 0 | Status: DISCONTINUED | OUTPATIENT
Start: 2019-10-03 | End: 2019-10-15

## 2019-10-03 RX ORDER — ACETAMINOPHEN 500 MG
1000 TABLET ORAL ONCE
Refills: 0 | Status: COMPLETED | OUTPATIENT
Start: 2019-10-03 | End: 2019-10-03

## 2019-10-03 RX ORDER — INSULIN LISPRO 100/ML
VIAL (ML) SUBCUTANEOUS AT BEDTIME
Refills: 0 | Status: DISCONTINUED | OUTPATIENT
Start: 2019-10-03 | End: 2019-10-04

## 2019-10-03 RX ORDER — INSULIN LISPRO 100/ML
VIAL (ML) SUBCUTANEOUS
Refills: 0 | Status: DISCONTINUED | OUTPATIENT
Start: 2019-10-03 | End: 2019-10-04

## 2019-10-03 RX ORDER — ONDANSETRON 8 MG/1
4 TABLET, FILM COATED ORAL ONCE
Refills: 0 | Status: COMPLETED | OUTPATIENT
Start: 2019-10-03 | End: 2019-10-03

## 2019-10-03 RX ORDER — HEPARIN SODIUM 5000 [USP'U]/ML
5000 INJECTION INTRAVENOUS; SUBCUTANEOUS EVERY 8 HOURS
Refills: 0 | Status: DISCONTINUED | OUTPATIENT
Start: 2019-10-03 | End: 2019-10-15

## 2019-10-03 RX ORDER — DEXTROSE 50 % IN WATER 50 %
15 SYRINGE (ML) INTRAVENOUS ONCE
Refills: 0 | Status: DISCONTINUED | OUTPATIENT
Start: 2019-10-03 | End: 2019-10-15

## 2019-10-03 RX ORDER — OXYCODONE AND ACETAMINOPHEN 5; 325 MG/1; MG/1
1 TABLET ORAL EVERY 6 HOURS
Refills: 0 | Status: DISCONTINUED | OUTPATIENT
Start: 2019-10-03 | End: 2019-10-05

## 2019-10-03 RX ORDER — ASPIRIN/CALCIUM CARB/MAGNESIUM 324 MG
325 TABLET ORAL DAILY
Refills: 0 | Status: DISCONTINUED | OUTPATIENT
Start: 2019-10-03 | End: 2019-10-15

## 2019-10-03 RX ORDER — POTASSIUM CHLORIDE 20 MEQ
10 PACKET (EA) ORAL
Refills: 0 | Status: COMPLETED | OUTPATIENT
Start: 2019-10-03 | End: 2019-10-03

## 2019-10-03 RX ORDER — OXYCODONE AND ACETAMINOPHEN 5; 325 MG/1; MG/1
2 TABLET ORAL EVERY 6 HOURS
Refills: 0 | Status: DISCONTINUED | OUTPATIENT
Start: 2019-10-03 | End: 2019-10-09

## 2019-10-03 RX ORDER — GLUCAGON INJECTION, SOLUTION 0.5 MG/.1ML
1 INJECTION, SOLUTION SUBCUTANEOUS ONCE
Refills: 0 | Status: DISCONTINUED | OUTPATIENT
Start: 2019-10-03 | End: 2019-10-15

## 2019-10-03 RX ORDER — HYDROMORPHONE HYDROCHLORIDE 2 MG/ML
0.25 INJECTION INTRAMUSCULAR; INTRAVENOUS; SUBCUTANEOUS ONCE
Refills: 0 | Status: DISCONTINUED | OUTPATIENT
Start: 2019-10-03 | End: 2019-10-03

## 2019-10-03 RX ORDER — METOPROLOL TARTRATE 50 MG
12.5 TABLET ORAL EVERY 12 HOURS
Refills: 0 | Status: DISCONTINUED | OUTPATIENT
Start: 2019-10-03 | End: 2019-10-04

## 2019-10-03 RX ORDER — SODIUM CHLORIDE 9 MG/ML
1000 INJECTION, SOLUTION INTRAVENOUS
Refills: 0 | Status: DISCONTINUED | OUTPATIENT
Start: 2019-10-03 | End: 2019-10-15

## 2019-10-03 RX ORDER — HYDROMORPHONE HYDROCHLORIDE 2 MG/ML
0.5 INJECTION INTRAMUSCULAR; INTRAVENOUS; SUBCUTANEOUS ONCE
Refills: 0 | Status: DISCONTINUED | OUTPATIENT
Start: 2019-10-03 | End: 2019-10-03

## 2019-10-03 RX ORDER — DEXTROSE 50 % IN WATER 50 %
12.5 SYRINGE (ML) INTRAVENOUS ONCE
Refills: 0 | Status: DISCONTINUED | OUTPATIENT
Start: 2019-10-03 | End: 2019-10-15

## 2019-10-03 RX ADMIN — CHLORHEXIDINE GLUCONATE 1 APPLICATION(S): 213 SOLUTION TOPICAL at 05:50

## 2019-10-03 RX ADMIN — HYDROMORPHONE HYDROCHLORIDE 0.25 MILLIGRAM(S): 2 INJECTION INTRAMUSCULAR; INTRAVENOUS; SUBCUTANEOUS at 14:30

## 2019-10-03 RX ADMIN — ATORVASTATIN CALCIUM 80 MILLIGRAM(S): 80 TABLET, FILM COATED ORAL at 06:32

## 2019-10-03 RX ADMIN — Medication 62.5 MILLIMOLE(S): at 04:45

## 2019-10-03 RX ADMIN — HYDROMORPHONE HYDROCHLORIDE 0.5 MILLIGRAM(S): 2 INJECTION INTRAMUSCULAR; INTRAVENOUS; SUBCUTANEOUS at 01:00

## 2019-10-03 RX ADMIN — NICARDIPINE HYDROCHLORIDE 15 MG/HR: 30 CAPSULE, EXTENDED RELEASE ORAL at 01:00

## 2019-10-03 RX ADMIN — Medication 400 MILLIGRAM(S): at 04:15

## 2019-10-03 RX ADMIN — FENTANYL CITRATE 50 MICROGRAM(S): 50 INJECTION INTRAVENOUS at 20:45

## 2019-10-03 RX ADMIN — Medication 100 MILLIGRAM(S): at 13:30

## 2019-10-03 RX ADMIN — Medication 12.5 MILLIGRAM(S): at 06:32

## 2019-10-03 RX ADMIN — FENTANYL CITRATE 50 MICROGRAM(S): 50 INJECTION INTRAVENOUS at 18:06

## 2019-10-03 RX ADMIN — Medication 100 MILLIGRAM(S): at 21:56

## 2019-10-03 RX ADMIN — Medication 5: at 17:41

## 2019-10-03 RX ADMIN — Medication 2 MILLIGRAM(S): at 13:30

## 2019-10-03 RX ADMIN — CHLORHEXIDINE GLUCONATE 15 MILLILITER(S): 213 SOLUTION TOPICAL at 05:50

## 2019-10-03 RX ADMIN — Medication 100 MILLIGRAM(S): at 08:17

## 2019-10-03 RX ADMIN — FENTANYL CITRATE 50 MICROGRAM(S): 50 INJECTION INTRAVENOUS at 17:52

## 2019-10-03 RX ADMIN — Medication 1000 MILLIGRAM(S): at 04:30

## 2019-10-03 RX ADMIN — Medication 12.5 MILLIGRAM(S): at 17:21

## 2019-10-03 RX ADMIN — Medication 400 MILLIGRAM(S): at 11:00

## 2019-10-03 RX ADMIN — Medication 100 MILLIGRAM(S): at 05:49

## 2019-10-03 RX ADMIN — CARBIDOPA AND LEVODOPA 1 TABLET(S): 25; 100 TABLET ORAL at 13:31

## 2019-10-03 RX ADMIN — Medication 2 MILLIGRAM(S): at 05:50

## 2019-10-03 RX ADMIN — CARBIDOPA AND LEVODOPA 1 TABLET(S): 25; 100 TABLET ORAL at 05:50

## 2019-10-03 RX ADMIN — FENTANYL CITRATE 50 MICROGRAM(S): 50 INJECTION INTRAVENOUS at 21:00

## 2019-10-03 RX ADMIN — Medication 325 MILLIGRAM(S): at 11:58

## 2019-10-03 RX ADMIN — Medication 2 MILLIGRAM(S): at 21:55

## 2019-10-03 RX ADMIN — Medication 100 MILLIGRAM(S): at 16:51

## 2019-10-03 RX ADMIN — OXYCODONE AND ACETAMINOPHEN 2 TABLET(S): 5; 325 TABLET ORAL at 23:45

## 2019-10-03 RX ADMIN — CARBIDOPA AND LEVODOPA 1 TABLET(S): 25; 100 TABLET ORAL at 21:55

## 2019-10-03 RX ADMIN — Medication 1000 MILLIGRAM(S): at 11:15

## 2019-10-03 RX ADMIN — HEPARIN SODIUM 5000 UNIT(S): 5000 INJECTION INTRAVENOUS; SUBCUTANEOUS at 21:55

## 2019-10-03 RX ADMIN — HEPARIN SODIUM 5000 UNIT(S): 5000 INJECTION INTRAVENOUS; SUBCUTANEOUS at 13:30

## 2019-10-03 RX ADMIN — HYDROMORPHONE HYDROCHLORIDE 0.5 MILLIGRAM(S): 2 INJECTION INTRAMUSCULAR; INTRAVENOUS; SUBCUTANEOUS at 01:15

## 2019-10-03 RX ADMIN — HYDROMORPHONE HYDROCHLORIDE 0.25 MILLIGRAM(S): 2 INJECTION INTRAMUSCULAR; INTRAVENOUS; SUBCUTANEOUS at 14:45

## 2019-10-03 NOTE — PHYSICAL THERAPY INITIAL EVALUATION ADULT - DIAGNOSIS, PT EVAL
dec functional mobility secondary to dec strength, balance and endurance
dec functional mobility secondary to dec strength, balance and endurance

## 2019-10-03 NOTE — PHYSICAL THERAPY INITIAL EVALUATION ADULT - PHYSICAL ASSIST/NONPHYSICAL ASSIST: SIT/STAND, REHAB EVAL
2 person assist/verbal cues/nonverbal cues (demo/gestures)
nonverbal cues (demo/gestures)/1 person assist/verbal cues

## 2019-10-03 NOTE — PHYSICAL THERAPY INITIAL EVALUATION ADULT - GENERAL OBSERVATIONS, REHAB EVAL
pt recd seated in bedside chair, NAD, +chest tube x2, +5LO2, +RIJ, +johnson
Pt recd semi supine in bed, NAD, agreeable to PT

## 2019-10-03 NOTE — PHYSICAL THERAPY INITIAL EVALUATION ADULT - ADDITIONAL COMMENTS
PTA pt was mostly independent with functional mobility and ADL's occasionally needing assistance for some ADL's and transfers. Pt lives in a PH with his son and daughter in law  steps to enter and no steps inside./
PTA pt was mostly independent with functional mobility and ADL's occasionally needing assistance for some AADL's and transfers. Pt lives in a PH with his son and daughter in law  steps to enter and no steps inside./

## 2019-10-03 NOTE — PHYSICAL THERAPY INITIAL EVALUATION ADULT - STRENGTHENING, PT EVAL
GOAL: Pt will improve strength to at least a 4+/5 in order to improve safety with functional mobility in 2 weeks
GOAL: Pt will improve strength to at least a 4+/5 in order to improve safety with functional mobility in 2 weeks

## 2019-10-03 NOTE — PROGRESS NOTE ADULT - SUBJECTIVE AND OBJECTIVE BOX
[facility] [phone]        Patient seen and examined in bed.  He was extubated and still on no pressors         MEDICATIONS  (STANDING):  atorvastatin 80 milliGRAM(s) Oral at bedtime  carbidopa/levodopa  25/100 1 Tablet(s) Oral three times a day  cefuroxime  IVPB 1500 milliGRAM(s) IV Intermittent every 8 hours  chlorhexidine 0.12% Liquid 15 milliLiter(s) Oral Mucosa every 12 hours  chlorhexidine 2% Cloths 1 Application(s) Topical <User Schedule>  dextrose 50% Injectable 50 milliLiter(s) IV Push every 15 minutes  dextrose 50% Injectable 25 milliLiter(s) IV Push every 15 minutes  docusate sodium 100 milliGRAM(s) Oral three times a day  insulin regular Infusion 3 Unit(s)/Hr (3 mL/Hr) IV Continuous <Continuous>  metoprolol tartrate 12.5 milliGRAM(s) Oral every 12 hours  niCARdipine Infusion 3 mG/Hr (15 mL/Hr) IV Continuous <Continuous>  pantoprazole  Injectable 40 milliGRAM(s) IV Push daily  sodium chloride 0.9%. 1000 milliLiter(s) (10 mL/Hr) IV Continuous <Continuous>  trihexyphenidyl 2 milliGRAM(s) Oral three times a day      VITAL:  T(C): , Max: 37.4 (10-03-19 @ 00:00)  T(F): , Max: 99.3 (10-03-19 @ 00:00)  HR: 66 (10-03-19 @ 07:15)  BP: --  BP(mean): --  RR: 19 (10-03-19 @ 07:15)  SpO2: 100% (10-03-19 @ 07:15)  Wt(kg): --    I and O's:    10-02 @ 07:01  -  10-03 @ 07:00  --------------------------------------------------------  IN: 2990.1 mL / OUT: 2476 mL / NET: 514.1 mL    10-03 @ 07:01  -  10-03 @ 08:11  --------------------------------------------------------  IN: 76.5 mL / OUT: 65 mL / NET: 11.5 mL      Height (cm): 180.34 (10-02 @ 08:25)  Weight (kg): 108.7 (10-02 @ 08:25)  BMI (kg/m2): 33.4 (10-02 @ 08:25)  BSA (m2): 2.28 (10-02 @ 08:25)    PHYSICAL EXAM:    Constitutional: NAD  Neck:  No JVD  Respiratory: CTAB/L  Cardiovascular: S1 and S2  Gastrointestinal: BS+, soft, NT/ND  Extremities: No peripheral edema  Neurological: A/O x 3, no focal deficits  Psychiatric: Normal mood, normal affect  : +  Javier  Skin: No rashes  Access: Not applicable    LABS:                        10.3   15.47 )-----------( 311      ( 03 Oct 2019 01:40 )             29.1     10-03    143  |  107  |  35<H>  ----------------------------<  104<H>  4.3   |  24  |  2.61<H>    Ca    8.8      03 Oct 2019 01:40  Phos  1.9     10-03  Mg     3.1     10-03    TPro  5.7<L>  /  Alb  3.2<L>  /  TBili  0.9  /  DBili  x   /  AST  45<H>  /  ALT  5<L>  /  AlkPhos  56  10-03          Urine Studies:          RADIOLOGY & ADDITIONAL STUDIES:      < from: Xray Chest 1 View- PORTABLE-Routine (10.03.19 @ 06:45) >    EXAM:  XR CHEST PORTABLE ROUTINE 1V                            PROCEDURE DATE:  10/03/2019            INTERPRETATION:  A single chest x-ray was obtained on October 3, 2019.    Indication: Status post cardiac surgery.    Impression:    The heart is normal in size. The lungs appear to be clear. All life   supporting devices are in good position and unchanged when compared to   previous study done October 2, 2019. No pneumothorax. Status post   sternotomy.                    KELLI MONTENEGRO M.D.,ATTENDING RADIOLOGIST  This document has been electronically signed. Oct  3 2019  6:33AM                < end of copied text >

## 2019-10-03 NOTE — PHYSICAL THERAPY INITIAL EVALUATION ADULT - PLANNED THERAPY INTERVENTIONS, PT EVAL
strengthening/gait training/transfer training/GOAL: Pt will negotiate 4 steps CGA +UHR in 2 weeks/balance training/bed mobility training
balance training/bed mobility training/gait training/strengthening/transfer training/GOAL: Pt will negotiate 4 steps independently in 2 weeks

## 2019-10-03 NOTE — AIRWAY REMOVAL NOTE  ADULT & PEDS - ARTIFICAL AIRWAY REMOVAL COMMENTS
Written order for extubation verified. The patient was identified by full name and birth date compared to the identification band. Present during the procedure was Marcie FORDE

## 2019-10-03 NOTE — PROGRESS NOTE ADULT - SUBJECTIVE AND OBJECTIVE BOX
S: Denies chest pain, SOB, palpitations or dizziness. Review of systems otherwise (-)        MEDICATIONS  (STANDING):  aspirin enteric coated 325 milliGRAM(s) Oral daily  atorvastatin 80 milliGRAM(s) Oral at bedtime  carbidopa/levodopa  25/100 1 Tablet(s) Oral three times a day  cefuroxime  IVPB 1500 milliGRAM(s) IV Intermittent every 8 hours  chlorhexidine 0.12% Liquid 15 milliLiter(s) Oral Mucosa every 12 hours  chlorhexidine 2% Cloths 1 Application(s) Topical <User Schedule>  dextrose 50% Injectable 50 milliLiter(s) IV Push every 15 minutes  dextrose 50% Injectable 25 milliLiter(s) IV Push every 15 minutes  docusate sodium 100 milliGRAM(s) Oral three times a day  heparin  Injectable 5000 Unit(s) SubCutaneous every 8 hours  insulin regular Infusion 3 Unit(s)/Hr (3 mL/Hr) IV Continuous <Continuous>  metoprolol tartrate 12.5 milliGRAM(s) Oral every 12 hours  pantoprazole    Tablet 40 milliGRAM(s) Oral before breakfast  sodium chloride 0.9%. 1000 milliLiter(s) (10 mL/Hr) IV Continuous <Continuous>  trihexyphenidyl 2 milliGRAM(s) Oral three times a day      LABS:  CARDIAC MARKERS ( 02 Oct 2019 16:22 )   U/L / CKMB26.1 ng/mL / Troponin Tx     /                       10.3   15.47 )-----------( 311      ( 03 Oct 2019 01:40 )             29.1     Hemoglobin: 10.3 g/dL (10-03 @ 01:40)  Hemoglobin: 8.5 g/dL (10-02 @ 16:22)  Hemoglobin: 11.1 g/dL (10-01 @ 06:54)  Hemoglobin: 12.3 g/dL (09-30 @ 05:55)  Hemoglobin: 11.9 g/dL (09-29 @ 06:34)    10-03    143  |  107  |  35<H>  ----------------------------<  104<H>  4.3   |  24  |  2.61<H>    Ca    8.8      03 Oct 2019 01:40  Phos  1.9     10-03  Mg     3.1     10-03    TPro  5.7<L>  /  Alb  3.2<L>  /  TBili  0.9  /  DBili  x   /  AST  45<H>  /  ALT  5<L>  /  AlkPhos  56  10-03    Creatinine Trend: 2.61<--, 2.23<--, 2.70<--, 2.34<--, 2.47<--, 2.77<--   PT/INR - ( 03 Oct 2019 01:40 )   PT: 12.6 sec;   INR: 1.09 ratio         PTT - ( 03 Oct 2019 01:40 )  PTT:28.3 sec  CARDIAC MARKERS ( 02 Oct 2019 16:22 )  x     / x     / 310 U/L / x     / 26.1 ng/mL      PHYSICAL EXAM  Vital Signs Last 24 Hrs  T(C): 37.4 (03 Oct 2019 12:00), Max: 37.4 (03 Oct 2019 00:00)  T(F): 99.3 (03 Oct 2019 12:00), Max: 99.3 (03 Oct 2019 00:00)  HR: 68 (03 Oct 2019 13:00) (54 - 81)  BP: 133/62 (03 Oct 2019 12:00) (133/62 - 133/62)  RR: 19 (03 Oct 2019 13:00) (10 - 27)  SpO2: 100% (03 Oct 2019 13:00) (96% - 100%)    Gen: Appears well in NAD  HEENT:  (-)icterus (-)pallor  CV: N S1 S2 RRR no m/r/g   Resp:  Clear to auscultation B/L, normal effort  GI: (+) BS Soft, NT, ND  Lymph:  + non-pitting edema bilaterally (-)obvious lymphadenopathy  Skin: Warm to touch, Normal turgor  Psych: Appropriate mood and affect    TELEMETRY: SB/SR 50-70s    < from: Transthoracic Echocardiogram (09.22.19 @ 08:18) >  CONCLUSIONS:  1. Mitral annular calcification, otherwise normal mitral  valve. Mild mitral regurgitation.  2. Increased relative wall thickness with normal left  ventricular mass index, consistent with concentric left  ventricular remodeling.  3. Mild segmental left ventricular systolic dysfunction.  The anteroseptal and apical walls appear hypokinetic.  4. Mild diastolic dysfunction (Stage I).  5. The right ventricle is not well visualized; grossly  normal right ventricular systolic function.  ------------------------------------------------------------------------  Confirmed on  9/22/2019 - 12:25:06 by Jae Junior MD, FAC,  FASE, RPVI  ------------------------------------------    < end of copied text >      ASSESSMENT/PLAN: 	  70 yo M PMHx CAD s/p stent x 1 (2010), T2DM, HTN, Parkinson's, history of meningioma, admitted with NSTEMI, TTE with mild segmental LV dysfxn, cath with multivessel CAD, s/p C5L LIMA-LAD, SVG-RPDA, SVG-RPL, SVG-Ramus, SVG-OM 10/2    --extubated, off pressors  --progressing well post OP  --care per CTU  --will follow with you  --Pt to establish in the office after DC

## 2019-10-03 NOTE — PHYSICAL THERAPY INITIAL EVALUATION ADULT - BED MOBILITY TRAINING, PT EVAL
GOAL: pt will perform all aspects of bed mobility independently in 2 weeks
GOAL: pt will perform all aspects of bed mobility independently in 2 weeks

## 2019-10-03 NOTE — PHYSICAL THERAPY INITIAL EVALUATION ADULT - PERTINENT HX OF CURRENT PROBLEM, REHAB EVAL
Pt is a 70 yo M PMHx CAD s/p stent x 1 (2010), T2DM, HTN, Parkinson's presenting with c/o 10/10 chest pain described as burning radiating from abdomen to midsternum x 1 day. Pt s/p cardiac cath at [facility] found to have LAD 70%, RCA 80% Cx 90% stenosis requiring transfer to [facility], for CABG eval, pt is now s/p C5L.
Pt is a 68 yo M PMHx CAD s/p stent x 1 (2010), T2DM, HTN, Parkinson's presenting with c/o 10/10 chest pain described as burning radiating from abdomen to midsternum x 1 day. Pt s/p cardiac cath at [facility] found to have LAD 70%, RCA 80% Cx 90% stenosis requiring transfer to [facility], pt is now scheduled for CABG.

## 2019-10-03 NOTE — PROGRESS NOTE ADULT - ATTENDING COMMENTS
Patient seen and examined.  Agree with above.   -continue with supportive care per ctu  -follow up cts    Apple Feliz MD

## 2019-10-03 NOTE — PROGRESS NOTE ADULT - SUBJECTIVE AND OBJECTIVE BOX
ORLIN HARDIN   MRN#: [id number]     The patient is a 69y Male PMHx CAD s/p stent x 1 (2010), T2DM, HTN, Parkinson's presenting with c/o 10/10 chest pain described as burning radiating from abdomen to midsternum x 1 day., s/p C5L, who was seen, evaluated, & examined with the CTICU staff on rounds and later in the day with a multidisciplinary care plan formulated & implemented.  All available clinical, laboratory, radiographic, pharmacologic, and electrocardiographic data were reviewed & analyzed.      The patient was in the CTICU in critical condition secondary to persistent cardiopulmonary dysfunction, hemodynamically significant anemia/hypovolemia,  persistent cardiovascular dysfunction, CKD, & stress hyperglycemia.      Respiratory status required supplemental O2, the following of ABG’s with A-line monitoring, continuous pulse oximetry monitoring,  for support & to evaluate for & prevent further decompensation secondary to persistent cardiopulmonary dysfunction.      Invasive hemodynamic monitoring with  an A-line were required for the following of  continuous MAP/BP monitoring to ensure adequate cardiovascular support and to evaluate for & help prevent decompensation while receiving intermittent volume expansion, external epicardial pacing, blood transfusions, blood product transfusions, secondary to hemodynamically significant anemia/hypovolemia, cardiovascular dysfunction, bleeding induced anemia,  & non-infectious SIRS.       Metabolic stability, stress hyperglycemia, & infection prophylaxis required  the following of serial glucose levels to help achieve & maintain euglycemia.      Patient required more than the usual postoperative critical care management and I provided 30 minutes of non-continuous care to the patient.  Discussed at length with the CTICU staff and helped coordinate care.

## 2019-10-03 NOTE — PHYSICAL THERAPY INITIAL EVALUATION ADULT - TRANSFER TRAINING, PT EVAL
GOAL: pt will perform sit<>stand independently with RW in 2 weeks
GOAL: pt will perform sit<>stand independently with RW in 2 weeks

## 2019-10-03 NOTE — PHYSICAL THERAPY INITIAL EVALUATION ADULT - BALANCE TRAINING, PT EVAL
GOAL: pt will increase standing dynamic/static balance to good in order to improve safety with functional mobility in 2 weeks
GOAL: pt will increase standing dynamic/static balance to good in order to improve safety with functional mobility in 2 weeks

## 2019-10-03 NOTE — PROGRESS NOTE ADULT - ASSESSMENT
Assessment  DMT2: 69y Male with DM T2 with hyperglycemia, A1C 7.2%, was on oral meds and insulin at home, now postop CABG on insulin drip, blood sugars trending within acceptable range, no hypoglycemic episode, eating partial meals, comfortable.  Hypothyroidism: Patient has no hx thyroid dz, was not on any thyroid supplements, he denies neck swelling, neck pain, no hx neck surgery. Found incidentally with TSH 6.22 and was started on synthroid 50mcg PO daily.  CAD: postop CABG 10/2, recovering in CTU, on medications, no chest pain, stable, monitored.  HTN: Controlled,  on antihypertensive medications.  Parkinson's: on meds, stable.          Kelsie Reece MD  Cell: [phone]  Office: [phone] Assessment  DMT2: 69y Male with DM T2 with hyperglycemia, A1C 7.2%, was on oral meds and insulin at home, now postop CABG on insulin drip, blood sugars trending within acceptable range, no hypoglycemic episode, eating partial meals, comfortable.  Hypothyroidism: Patient has no hx thyroid dz, was not on any thyroid supplements, he denies neck  swelling, neck pain, no hx neck surgery. Found incidentally with TSH 6.22 and was started on synthroid 50mcg PO daily.  CAD: postop CABG 10/2, recovering in CTU, on medications, no chest pain, stable, monitored.  HTN: Controlled,  on antihypertensive medications.  Parkinson's: on meds, stable.          Kelsie Reece MD  Cell: [phone]  Office: [phone]

## 2019-10-03 NOTE — PROGRESS NOTE ADULT - SUBJECTIVE AND OBJECTIVE BOX
Chief complaint  Patient is a 69y old  Male who presents with a chief complaint of Multivessel disease (03 Oct 2019 11:38)   Review of systems  Patient in bed, looks comfortable, no fever, no hypoglycemia.    Labs and Fingersticks  CAPILLARY BLOOD GLUCOSE  120 (03 Oct 2019 13:00)  151 (03 Oct 2019 12:00)  155 (03 Oct 2019 11:00)  133 (03 Oct 2019 10:00)  170 (03 Oct 2019 09:00)  169 (03 Oct 2019 08:00)  169 (03 Oct 2019 07:00)  164 (03 Oct 2019 06:00)  133 (03 Oct 2019 05:00)  117 (03 Oct 2019 04:00)  102 (03 Oct 2019 03:00)  94 (03 Oct 2019 02:30)  99 (03 Oct 2019 02:00)  101 (03 Oct 2019 01:00)  103 (03 Oct 2019 00:00)  105 (02 Oct 2019 23:00)  112 (02 Oct 2019 22:00)  122 (02 Oct 2019 21:00)  140 (02 Oct 2019 20:00)  123 (02 Oct 2019 19:00)  135 (02 Oct 2019 18:00)  151 (02 Oct 2019 17:00)  150 (02 Oct 2019 16:00)      POCT Blood Glucose.: 120 mg/dL (03 Oct 2019 13:08)  POCT Blood Glucose.: 155 mg/dL (03 Oct 2019 10:59)  POCT Blood Glucose.: 133 mg/dL (03 Oct 2019 10:08)  POCT Blood Glucose.: 169 mg/dL (03 Oct 2019 08:08)  POCT Blood Glucose.: 169 mg/dL (03 Oct 2019 06:51)  POCT Blood Glucose.: 79 mg/dL (03 Oct 2019 06:49)  POCT Blood Glucose.: 164 mg/dL (03 Oct 2019 06:06)  POCT Blood Glucose.: 102 mg/dL (03 Oct 2019 03:01)  POCT Blood Glucose.: 94 mg/dL (03 Oct 2019 02:29)  POCT Blood Glucose.: 101 mg/dL (03 Oct 2019 01:14)  POCT Blood Glucose.: 105 mg/dL (02 Oct 2019 22:56)  POCT Blood Glucose.: 112 mg/dL (02 Oct 2019 21:57)  POCT Blood Glucose.: 140 mg/dL (02 Oct 2019 20:10)      Anion Gap, Serum: 12 (10-03 @ 01:40)  Anion Gap, Serum: 17 (10-02 @ 16:22)      Calcium, Total Serum: 8.8 (10-03 @ 01:40)  Calcium, Total Serum: 8.7 (10-02 @ 16:22)  Albumin, Serum: 3.2 <L> (10-03 @ 01:40)  Albumin, Serum: 3.1 <L> (10-02 @ 16:22)    Alanine Aminotransferase (ALT/SGPT): 5 <L> (10-03 @ 01:40)  Alanine Aminotransferase (ALT/SGPT): 5 <L> (10-02 @ 16:22)  Alkaline Phosphatase, Serum: 56 (10-03 @ 01:40)  Alkaline Phosphatase, Serum: 54 (10-02 @ 16:22)  Aspartate Aminotransferase (AST/SGOT): 45 <H> (10-03 @ 01:40)  Aspartate Aminotransferase (AST/SGOT): 34 (10-02 @ 16:22)        10-03    143  |  107  |  35<H>  ----------------------------<  104<H>  4.3   |  24  |  2.61<H>    Ca    8.8      03 Oct 2019 01:40  Phos  1.9     10-03  Mg     3.1     10-03    TPro  5.7<L>  /  Alb  3.2<L>  /  TBili  0.9  /  DBili  x   /  AST  45<H>  /  ALT  5<L>  /  AlkPhos  56  10-03                        10.3   15.47 )-----------( 311      ( 03 Oct 2019 01:40 )             29.1     Medications  MEDICATIONS  (STANDING):  aspirin enteric coated 325 milliGRAM(s) Oral daily  atorvastatin 80 milliGRAM(s) Oral at bedtime  carbidopa/levodopa  25/100 1 Tablet(s) Oral three times a day  cefuroxime  IVPB 1500 milliGRAM(s) IV Intermittent every 8 hours  chlorhexidine 0.12% Liquid 15 milliLiter(s) Oral Mucosa every 12 hours  chlorhexidine 2% Cloths 1 Application(s) Topical <User Schedule>  dextrose 50% Injectable 50 milliLiter(s) IV Push every 15 minutes  dextrose 50% Injectable 25 milliLiter(s) IV Push every 15 minutes  docusate sodium 100 milliGRAM(s) Oral three times a day  heparin  Injectable 5000 Unit(s) SubCutaneous every 8 hours  insulin regular Infusion 3 Unit(s)/Hr (3 mL/Hr) IV Continuous <Continuous>  metoprolol tartrate 12.5 milliGRAM(s) Oral every 12 hours  pantoprazole    Tablet 40 milliGRAM(s) Oral before breakfast  sodium chloride 0.9%. 1000 milliLiter(s) (10 mL/Hr) IV Continuous <Continuous>  trihexyphenidyl 2 milliGRAM(s) Oral three times a day      Physical Exam  General: Patient comfortable in bed  Vital Signs Last 12 Hrs  T(F): 99.3 (10-03-19 @ 12:00), Max: 99.3 (10-03-19 @ 04:00)  HR: 68 (10-03-19 @ 13:00) (54 - 81)  BP: 133/62 (10-03-19 @ 12:00) (133/62 - 133/62)  BP(mean): --  RR: 19 (10-03-19 @ 13:00) (10 - 27)  SpO2: 100% (10-03-19 @ 13:00) (96% - 100%)  Neck: No palpable thyroid nodules.  CVS: S1S2, No murmurs  Respiratory: No wheezing, no crepitations  GI: Abdomen soft, bowel sounds positive  Musculoskeletal:  edema lower extremities.   Skin: No skin rashes, no ecchymosis    Diagnostics    Free Thyroxine, Serum: AM Sched. Collection: 30-Sep-2019 06:00 (09-29 @ 10:47) Chief complaint  Patient is a 69y old  Male who presents with a chief complaint of Multivessel disease (03 Oct 2019 11:38)   Review of systems  Patient in bed, looks comfortable, no fever,  no hypoglycemia.    Labs and Fingersticks  CAPILLARY BLOOD GLUCOSE  120 (03 Oct 2019 13:00)  151 (03 Oct 2019 12:00)  155 (03 Oct 2019 11:00)  133 (03 Oct 2019 10:00)  170 (03 Oct 2019 09:00)  169 (03 Oct 2019 08:00)  169 (03 Oct 2019 07:00)  164 (03 Oct 2019 06:00)  133 (03 Oct 2019 05:00)  117 (03 Oct 2019 04:00)  102 (03 Oct 2019 03:00)  94 (03 Oct 2019 02:30)  99 (03 Oct 2019 02:00)  101 (03 Oct 2019 01:00)  103 (03 Oct 2019 00:00)  105 (02 Oct 2019 23:00)  112 (02 Oct 2019 22:00)  122 (02 Oct 2019 21:00)  140 (02 Oct 2019 20:00)  123 (02 Oct 2019 19:00)  135 (02 Oct 2019 18:00)  151 (02 Oct 2019 17:00)  150 (02 Oct 2019 16:00)      POCT Blood Glucose.: 120 mg/dL (03 Oct 2019 13:08)  POCT Blood Glucose.: 155 mg/dL (03 Oct 2019 10:59)  POCT Blood Glucose.: 133 mg/dL (03 Oct 2019 10:08)  POCT Blood Glucose.: 169 mg/dL (03 Oct 2019 08:08)  POCT Blood Glucose.: 169 mg/dL (03 Oct 2019 06:51)  POCT Blood Glucose.: 79 mg/dL (03 Oct 2019 06:49)  POCT Blood Glucose.: 164 mg/dL (03 Oct 2019 06:06)  POCT Blood Glucose.: 102 mg/dL (03 Oct 2019 03:01)  POCT Blood Glucose.: 94 mg/dL (03 Oct 2019 02:29)  POCT Blood Glucose.: 101 mg/dL (03 Oct 2019 01:14)  POCT Blood Glucose.: 105 mg/dL (02 Oct 2019 22:56)  POCT Blood Glucose.: 112 mg/dL (02 Oct 2019 21:57)  POCT Blood Glucose.: 140 mg/dL (02 Oct 2019 20:10)      Anion Gap, Serum: 12 (10-03 @ 01:40)  Anion Gap, Serum: 17 (10-02 @ 16:22)      Calcium, Total Serum: 8.8 (10-03 @ 01:40)  Calcium, Total Serum: 8.7 (10-02 @ 16:22)  Albumin, Serum: 3.2 <L> (10-03 @ 01:40)  Albumin, Serum: 3.1 <L> (10-02 @ 16:22)    Alanine Aminotransferase (ALT/SGPT): 5 <L> (10-03 @ 01:40)  Alanine Aminotransferase (ALT/SGPT): 5 <L> (10-02 @ 16:22)  Alkaline Phosphatase, Serum: 56 (10-03 @ 01:40)  Alkaline Phosphatase, Serum: 54 (10-02 @ 16:22)  Aspartate Aminotransferase (AST/SGOT): 45 <H> (10-03 @ 01:40)  Aspartate Aminotransferase (AST/SGOT): 34 (10-02 @ 16:22)        10-03    143  |  107  |  35<H>  ----------------------------<  104<H>  4.3   |  24  |  2.61<H>    Ca    8.8      03 Oct 2019 01:40  Phos  1.9     10-03  Mg     3.1     10-03    TPro  5.7<L>  /  Alb  3.2<L>  /  TBili  0.9  /  DBili  x   /  AST  45<H>  /  ALT  5<L>  /  AlkPhos  56  10-03                        10.3   15.47 )-----------( 311      ( 03 Oct 2019 01:40 )             29.1     Medications  MEDICATIONS  (STANDING):  aspirin enteric coated 325 milliGRAM(s) Oral daily  atorvastatin 80 milliGRAM(s) Oral at bedtime  carbidopa/levodopa  25/100 1 Tablet(s) Oral three times a day  cefuroxime  IVPB 1500 milliGRAM(s) IV Intermittent every 8 hours  chlorhexidine 0.12% Liquid 15 milliLiter(s) Oral Mucosa every 12 hours  chlorhexidine 2% Cloths 1 Application(s) Topical <User Schedule>  dextrose 50% Injectable 50 milliLiter(s) IV Push every 15 minutes  dextrose 50% Injectable 25 milliLiter(s) IV Push every 15 minutes  docusate sodium 100 milliGRAM(s) Oral three times a day  heparin  Injectable 5000 Unit(s) SubCutaneous every 8 hours  insulin regular Infusion 3 Unit(s)/Hr (3 mL/Hr) IV Continuous <Continuous>  metoprolol tartrate 12.5 milliGRAM(s) Oral every 12 hours  pantoprazole    Tablet 40 milliGRAM(s) Oral before breakfast  sodium chloride 0.9%. 1000 milliLiter(s) (10 mL/Hr) IV Continuous <Continuous>  trihexyphenidyl 2 milliGRAM(s) Oral three times a day      Physical Exam  General: Patient comfortable in bed  Vital Signs Last 12 Hrs  T(F): 99.3 (10-03-19 @ 12:00), Max: 99.3 (10-03-19 @ 04:00)  HR: 68 (10-03-19 @ 13:00) (54 - 81)  BP: 133/62 (10-03-19 @ 12:00) (133/62 - 133/62)  BP(mean): --  RR: 19 (10-03-19 @ 13:00) (10 - 27)  SpO2: 100% (10-03-19 @ 13:00) (96% - 100%)  Neck: No palpable thyroid nodules.  CVS: S1S2, No murmurs  Respiratory: No wheezing, no crepitations  GI: Abdomen soft, bowel sounds positive  Musculoskeletal:  edema lower extremities.   Skin: No skin rashes, no ecchymosis    Diagnostics    Free Thyroxine, Serum: AM Sched. Collection: 30-Sep-2019 06:00 (09-29 @ 10:47)

## 2019-10-03 NOTE — PHYSICAL THERAPY INITIAL EVALUATION ADULT - GAIT TRAINING, PT EVAL
GOAL: pt will amb 200ft independently with RW in 2 weeks
GOAL: pt will amb 200ft independently with RW in 2 weeks

## 2019-10-03 NOTE — PROGRESS NOTE ADULT - SUBJECTIVE AND OBJECTIVE BOX
[facility]      Seen earlier today, and examined.  - Today, patient is without complaints.           *****MEDICATIONS: Current medication reviewed and documented.    MEDICATIONS  (STANDING):  aspirin enteric coated 325 milliGRAM(s) Oral daily  atorvastatin 80 milliGRAM(s) Oral at bedtime  carbidopa/levodopa  25/100 1 Tablet(s) Oral three times a day  cefuroxime  IVPB 1500 milliGRAM(s) IV Intermittent every 8 hours  chlorhexidine 0.12% Liquid 15 milliLiter(s) Oral Mucosa every 12 hours  chlorhexidine 2% Cloths 1 Application(s) Topical <User Schedule>  dextrose 50% Injectable 50 milliLiter(s) IV Push every 15 minutes  dextrose 50% Injectable 25 milliLiter(s) IV Push every 15 minutes  docusate sodium 100 milliGRAM(s) Oral three times a day  heparin  Injectable 5000 Unit(s) SubCutaneous every 8 hours  insulin regular Infusion 3 Unit(s)/Hr (3 mL/Hr) IV Continuous <Continuous>  metoprolol tartrate 12.5 milliGRAM(s) Oral every 12 hours  pantoprazole    Tablet 40 milliGRAM(s) Oral before breakfast  sodium chloride 0.9%. 1000 milliLiter(s) (10 mL/Hr) IV Continuous <Continuous>  trihexyphenidyl 2 milliGRAM(s) Oral three times a day    MEDICATIONS  (PRN):          ***** VITAL SIGNS:  T(F): 99.3 (10-03-19 @ 08:00), Max: 99.3 (10-03-19 @ 00:00)  HR: 70 (10-03-19 @ 11:00) (54 - 81)  BP: --  RR: 23 (10-03-19 @ 11:00) (10 - 27)  SpO2: 100% (10-03-19 @ 11:00) (96% - 100%)  Wt(kg): --  ,   I&O's Summary    02 Oct 2019 07:01  -  03 Oct 2019 07:00  --------------------------------------------------------  IN: 2990.1 mL / OUT: 2476 mL / NET: 514.1 mL    03 Oct 2019 07:01  -  03 Oct 2019 11:38  --------------------------------------------------------  IN: 169 mL / OUT: 290 mL / NET: -121 mL             *****PHYSICAL EXAM:   Alert oriented x 2   Attention comprehension are fair. Able to name, repeat,  without any difficulty.   Able to follow 1-2 step commands.     EOMI fundi not visualized,  VFF to confrontration  No facial asymmetry   Tongue is midline   Palate elevates symmetrically   Moving all 4 ext symmetrically no pronator drift  left resting tremor noted   + cogwheeling     Gait : not assessed.     Gait not assessed.            *****LAB AND IMAGING:                        10.3   15.47 )-----------( 311      ( 03 Oct 2019 01:40 )             29.1               10-03    143  |  107  |  35<H>  ----------------------------<  104<H>  4.3   |  24  |  2.61<H>    Ca    8.8      03 Oct 2019 01:40  Phos  1.9     10-03  Mg     3.1     10-03    TPro  5.7<L>  /  Alb  3.2<L>  /  TBili  0.9  /  DBili  x   /  AST  45<H>  /  ALT  5<L>  /  AlkPhos  56  10-03    PT/INR - ( 03 Oct 2019 01:40 )   PT: 12.6 sec;   INR: 1.09 ratio         PTT - ( 03 Oct 2019 01:40 )  PTT:28.3 sec       CARDIAC MARKERS ( 02 Oct 2019 16:22 )  x     / x     / 310 U/L / x     / 26.1 ng/mL            ABG - ( 03 Oct 2019 09:12 )  pH, Arterial: 7.43  pH, Blood: x     /  pCO2: 35    /  pO2: 93    / HCO3: 23    / Base Excess: -.7   /  SaO2: 98                  [All pertinent recent Imaging/Reports reviewed]           *****A S S E S S M E N T   A N D   P L A N :    68 yo M PMHx CAD s/p stent x 1 (2010), T2DM, HTN, Parkinson's presenting with c/o 10/10 chest pain described as burning radiating from abdomen to midsternum x 1 day. Patient reports symptoms started at 2 pm and resolved after 1 hour. Chest pain began after he ate lunch and was walking up a flight of stairs. He reports chest pain was associated with dyspnea, diaphoresis, and nausea. At baseline, he ambulates with cane/walker. Takes medications daily including aspirin and prasugrel. On ROS, reports chronic left leg swelling x 1-2 years.        Problem/Recommendations 1: Dizziness of unclear etiology   s/p cabg,  day 1   appreciate ct care.    continue asa/atorvastatin for secondary prophy  Problem/Recommendations 2: Parkinson's stable exam  continue current regime        Thank you for allowing me to participate in the care of this patient. Please do not hesitate to call me if you have any  questions.        ________________  Lily Fang MD  [facility]  [phone]      33 minutes spent on total encounter; more than 50 % of the visit was  spent counseling about plan of care, compliance to diet/exercise and medication regimen and or  coordinating care by the attending physician.      It is advised that stroke patients follow up with ZACH Albarran @ [phone] in 1- 2 weeks.   Others please follow up with Dr. Michael Nissenbaum [phone]

## 2019-10-03 NOTE — PHYSICAL THERAPY INITIAL EVALUATION ADULT - GAIT DEVIATIONS NOTED, PT EVAL
decreased weight-shifting ability/decreased step length/decreased jose
decreased jose/decreased step length/decreased weight-shifting ability

## 2019-10-03 NOTE — PROGRESS NOTE ADULT - ASSESSMENT
70 yo M PMHx CAD s/p stent x 1 (2010), T2DM, HTN, Parkinson's and CKD stage 3b presents with chest pain. S/P cath with MVD- POD #1  CABG  Subnephrotic range proteinuria     1. Renal - PYF1c-5.  Trend BMP and urine output(which is great).    Creatinine will increase again in am;  No need for diuretics today but will likely need in am    2. CVS  IV lab to given for low CVP yesterday.  Not on pressors     3. Pulm - Incentive spirometry

## 2019-10-03 NOTE — PHYSICAL THERAPY INITIAL EVALUATION ADULT - IMPAIRMENTS FOUND, PT EVAL
aerobic capacity/endurance/gait, locomotion, and balance/muscle strength
muscle strength/aerobic capacity/endurance/gait, locomotion, and balance

## 2019-10-04 LAB
ALBUMIN SERPL ELPH-MCNC: 2.8 G/DL — LOW (ref 3.3–5)
ALP SERPL-CCNC: 53 U/L — SIGNIFICANT CHANGE UP (ref 40–120)
ALT FLD-CCNC: 5 U/L — LOW (ref 10–45)
ANION GAP SERPL CALC-SCNC: 11 MMOL/L — SIGNIFICANT CHANGE UP (ref 5–17)
APPEARANCE UR: CLEAR — SIGNIFICANT CHANGE UP
AST SERPL-CCNC: 29 U/L — SIGNIFICANT CHANGE UP (ref 10–40)
BACTERIA # UR AUTO: NEGATIVE — SIGNIFICANT CHANGE UP
BILIRUB SERPL-MCNC: 0.6 MG/DL — SIGNIFICANT CHANGE UP (ref 0.2–1.2)
BILIRUB UR-MCNC: NEGATIVE — SIGNIFICANT CHANGE UP
BLD GP AB SCN SERPL QL: NEGATIVE — SIGNIFICANT CHANGE UP
BUN SERPL-MCNC: 39 MG/DL — HIGH (ref 7–23)
CALCIUM SERPL-MCNC: 8.2 MG/DL — LOW (ref 8.4–10.5)
CHLORIDE SERPL-SCNC: 101 MMOL/L — SIGNIFICANT CHANGE UP (ref 96–108)
CO2 SERPL-SCNC: 22 MMOL/L — SIGNIFICANT CHANGE UP (ref 22–31)
COLOR SPEC: SIGNIFICANT CHANGE UP
CREAT SERPL-MCNC: 2.79 MG/DL — HIGH (ref 0.5–1.3)
DIFF PNL FLD: ABNORMAL
EPI CELLS # UR: 1 /HPF — SIGNIFICANT CHANGE UP
GLUCOSE SERPL-MCNC: 192 MG/DL — HIGH (ref 70–99)
GLUCOSE UR QL: ABNORMAL
HCT VFR BLD CALC: 27.4 % — LOW (ref 39–50)
HGB BLD-MCNC: 9.5 G/DL — LOW (ref 13–17)
HYALINE CASTS # UR AUTO: 15 /LPF — HIGH (ref 0–2)
KETONES UR-MCNC: NEGATIVE — SIGNIFICANT CHANGE UP
LEUKOCYTE ESTERASE UR-ACNC: NEGATIVE — SIGNIFICANT CHANGE UP
MAGNESIUM SERPL-MCNC: 2.4 MG/DL — SIGNIFICANT CHANGE UP (ref 1.6–2.6)
MCHC RBC-ENTMCNC: 31.1 PG — SIGNIFICANT CHANGE UP (ref 27–34)
MCHC RBC-ENTMCNC: 34.7 GM/DL — SIGNIFICANT CHANGE UP (ref 32–36)
MCV RBC AUTO: 89.8 FL — SIGNIFICANT CHANGE UP (ref 80–100)
NITRITE UR-MCNC: NEGATIVE — SIGNIFICANT CHANGE UP
NRBC # BLD: 0 /100 WBCS — SIGNIFICANT CHANGE UP (ref 0–0)
PH UR: 5.5 — SIGNIFICANT CHANGE UP (ref 5–8)
PHOSPHATE SERPL-MCNC: 3.5 MG/DL — SIGNIFICANT CHANGE UP (ref 2.5–4.5)
PLATELET # BLD AUTO: 260 K/UL — SIGNIFICANT CHANGE UP (ref 150–400)
POTASSIUM SERPL-MCNC: 4 MMOL/L — SIGNIFICANT CHANGE UP (ref 3.5–5.3)
POTASSIUM SERPL-SCNC: 4 MMOL/L — SIGNIFICANT CHANGE UP (ref 3.5–5.3)
PROT SERPL-MCNC: 5.8 G/DL — LOW (ref 6–8.3)
PROT UR-MCNC: 100 — SIGNIFICANT CHANGE UP
RBC # BLD: 3.05 M/UL — LOW (ref 4.2–5.8)
RBC # FLD: 14.7 % — HIGH (ref 10.3–14.5)
RBC CASTS # UR COMP ASSIST: 5 /HPF — HIGH (ref 0–4)
RH IG SCN BLD-IMP: POSITIVE — SIGNIFICANT CHANGE UP
SODIUM SERPL-SCNC: 134 MMOL/L — LOW (ref 135–145)
SP GR SPEC: 1.02 — SIGNIFICANT CHANGE UP (ref 1.01–1.02)
UROBILINOGEN FLD QL: NEGATIVE — SIGNIFICANT CHANGE UP
WBC # BLD: 17.16 K/UL — HIGH (ref 3.8–10.5)
WBC # FLD AUTO: 17.16 K/UL — HIGH (ref 3.8–10.5)
WBC UR QL: 2 /HPF — SIGNIFICANT CHANGE UP (ref 0–5)

## 2019-10-04 PROCEDURE — 71045 X-RAY EXAM CHEST 1 VIEW: CPT | Mod: 26

## 2019-10-04 PROCEDURE — 93010 ELECTROCARDIOGRAM REPORT: CPT

## 2019-10-04 PROCEDURE — 99291 CRITICAL CARE FIRST HOUR: CPT

## 2019-10-04 RX ORDER — FENTANYL CITRATE 50 UG/ML
50 INJECTION INTRAVENOUS ONCE
Refills: 0 | Status: DISCONTINUED | OUTPATIENT
Start: 2019-10-04 | End: 2019-10-04

## 2019-10-04 RX ORDER — SODIUM CHLORIDE 9 MG/ML
3 INJECTION INTRAMUSCULAR; INTRAVENOUS; SUBCUTANEOUS EVERY 8 HOURS
Refills: 0 | Status: DISCONTINUED | OUTPATIENT
Start: 2019-10-07 | End: 2019-10-15

## 2019-10-04 RX ORDER — INSULIN LISPRO 100/ML
VIAL (ML) SUBCUTANEOUS AT BEDTIME
Refills: 0 | Status: DISCONTINUED | OUTPATIENT
Start: 2019-10-04 | End: 2019-10-15

## 2019-10-04 RX ORDER — METOPROLOL TARTRATE 50 MG
25 TABLET ORAL EVERY 12 HOURS
Refills: 0 | Status: DISCONTINUED | OUTPATIENT
Start: 2019-10-04 | End: 2019-10-04

## 2019-10-04 RX ORDER — INSULIN LISPRO 100/ML
10 VIAL (ML) SUBCUTANEOUS
Refills: 0 | Status: DISCONTINUED | OUTPATIENT
Start: 2019-10-04 | End: 2019-10-11

## 2019-10-04 RX ORDER — METOPROLOL TARTRATE 50 MG
25 TABLET ORAL EVERY 8 HOURS
Refills: 0 | Status: DISCONTINUED | OUTPATIENT
Start: 2019-10-04 | End: 2019-10-15

## 2019-10-04 RX ORDER — INSULIN GLARGINE 100 [IU]/ML
20 INJECTION, SOLUTION SUBCUTANEOUS AT BEDTIME
Refills: 0 | Status: DISCONTINUED | OUTPATIENT
Start: 2019-10-04 | End: 2019-10-04

## 2019-10-04 RX ORDER — INSULIN LISPRO 100/ML
7 VIAL (ML) SUBCUTANEOUS
Refills: 0 | Status: DISCONTINUED | OUTPATIENT
Start: 2019-10-04 | End: 2019-10-04

## 2019-10-04 RX ORDER — INSULIN GLARGINE 100 [IU]/ML
28 INJECTION, SOLUTION SUBCUTANEOUS AT BEDTIME
Refills: 0 | Status: DISCONTINUED | OUTPATIENT
Start: 2019-10-04 | End: 2019-10-11

## 2019-10-04 RX ORDER — INSULIN LISPRO 100/ML
VIAL (ML) SUBCUTANEOUS
Refills: 0 | Status: DISCONTINUED | OUTPATIENT
Start: 2019-10-04 | End: 2019-10-15

## 2019-10-04 RX ORDER — ACETAMINOPHEN 500 MG
325 TABLET ORAL ONCE
Refills: 0 | Status: DISCONTINUED | OUTPATIENT
Start: 2019-10-04 | End: 2019-10-15

## 2019-10-04 RX ORDER — LEVOTHYROXINE SODIUM 125 MCG
50 TABLET ORAL DAILY
Refills: 0 | Status: DISCONTINUED | OUTPATIENT
Start: 2019-10-04 | End: 2019-10-15

## 2019-10-04 RX ORDER — CARBIDOPA AND LEVODOPA 25; 100 MG/1; MG/1
2 TABLET ORAL THREE TIMES A DAY
Refills: 0 | Status: DISCONTINUED | OUTPATIENT
Start: 2019-10-04 | End: 2019-10-15

## 2019-10-04 RX ADMIN — PANTOPRAZOLE SODIUM 40 MILLIGRAM(S): 20 TABLET, DELAYED RELEASE ORAL at 06:45

## 2019-10-04 RX ADMIN — Medication 100 MILLIGRAM(S): at 05:22

## 2019-10-04 RX ADMIN — Medication 7 UNIT(S): at 17:12

## 2019-10-04 RX ADMIN — OXYCODONE AND ACETAMINOPHEN 1 TABLET(S): 5; 325 TABLET ORAL at 05:20

## 2019-10-04 RX ADMIN — Medication 2 MILLIGRAM(S): at 13:07

## 2019-10-04 RX ADMIN — FENTANYL CITRATE 50 MICROGRAM(S): 50 INJECTION INTRAVENOUS at 04:34

## 2019-10-04 RX ADMIN — Medication 2: at 06:45

## 2019-10-04 RX ADMIN — Medication 7 UNIT(S): at 12:01

## 2019-10-04 RX ADMIN — Medication 100 MILLIGRAM(S): at 22:11

## 2019-10-04 RX ADMIN — CARBIDOPA AND LEVODOPA 2 TABLET(S): 25; 100 TABLET ORAL at 13:07

## 2019-10-04 RX ADMIN — CHLORHEXIDINE GLUCONATE 1 APPLICATION(S): 213 SOLUTION TOPICAL at 05:23

## 2019-10-04 RX ADMIN — Medication 2: at 17:11

## 2019-10-04 RX ADMIN — Medication 2 MILLIGRAM(S): at 22:41

## 2019-10-04 RX ADMIN — HEPARIN SODIUM 5000 UNIT(S): 5000 INJECTION INTRAVENOUS; SUBCUTANEOUS at 22:11

## 2019-10-04 RX ADMIN — Medication 10 MILLIGRAM(S): at 08:12

## 2019-10-04 RX ADMIN — CARBIDOPA AND LEVODOPA 2 TABLET(S): 25; 100 TABLET ORAL at 05:21

## 2019-10-04 RX ADMIN — OXYCODONE AND ACETAMINOPHEN 1 TABLET(S): 5; 325 TABLET ORAL at 05:50

## 2019-10-04 RX ADMIN — INSULIN GLARGINE 28 UNIT(S): 100 INJECTION, SOLUTION SUBCUTANEOUS at 22:10

## 2019-10-04 RX ADMIN — Medication 100 MILLIGRAM(S): at 10:56

## 2019-10-04 RX ADMIN — OXYCODONE AND ACETAMINOPHEN 2 TABLET(S): 5; 325 TABLET ORAL at 19:30

## 2019-10-04 RX ADMIN — HEPARIN SODIUM 5000 UNIT(S): 5000 INJECTION INTRAVENOUS; SUBCUTANEOUS at 05:22

## 2019-10-04 RX ADMIN — OXYCODONE AND ACETAMINOPHEN 2 TABLET(S): 5; 325 TABLET ORAL at 10:42

## 2019-10-04 RX ADMIN — Medication 25 MILLIGRAM(S): at 13:07

## 2019-10-04 RX ADMIN — Medication 2 MILLIGRAM(S): at 05:21

## 2019-10-04 RX ADMIN — ATORVASTATIN CALCIUM 80 MILLIGRAM(S): 80 TABLET, FILM COATED ORAL at 22:11

## 2019-10-04 RX ADMIN — Medication 25 MILLIGRAM(S): at 22:11

## 2019-10-04 RX ADMIN — OXYCODONE AND ACETAMINOPHEN 2 TABLET(S): 5; 325 TABLET ORAL at 20:00

## 2019-10-04 RX ADMIN — Medication 100 MILLIGRAM(S): at 00:38

## 2019-10-04 RX ADMIN — CARBIDOPA AND LEVODOPA 2 TABLET(S): 25; 100 TABLET ORAL at 22:11

## 2019-10-04 RX ADMIN — OXYCODONE AND ACETAMINOPHEN 2 TABLET(S): 5; 325 TABLET ORAL at 00:00

## 2019-10-04 RX ADMIN — Medication 4: at 12:01

## 2019-10-04 RX ADMIN — OXYCODONE AND ACETAMINOPHEN 2 TABLET(S): 5; 325 TABLET ORAL at 11:15

## 2019-10-04 RX ADMIN — HEPARIN SODIUM 5000 UNIT(S): 5000 INJECTION INTRAVENOUS; SUBCUTANEOUS at 10:55

## 2019-10-04 RX ADMIN — Medication 25 MILLIGRAM(S): at 05:22

## 2019-10-04 RX ADMIN — Medication 325 MILLIGRAM(S): at 10:56

## 2019-10-04 RX ADMIN — FENTANYL CITRATE 50 MICROGRAM(S): 50 INJECTION INTRAVENOUS at 04:49

## 2019-10-04 RX ADMIN — Medication 7 UNIT(S): at 08:18

## 2019-10-04 NOTE — PROGRESS NOTE ADULT - ASSESSMENT
Assessment  DMT2: 69y Male with DM T2 with hyperglycemia, A1C 7.2%, was on oral meds and insulin at home, now postop CABG on insulin, blood sugars elevated, patient did not receive basal insulin last night, no hypoglycemic episode, eating partial meals, comfortable.  Hypothyroidism: Patient has no hx thyroid dz, was not on any thyroid supplements, found incidentally with TSH 6.22 and was started on synthroid 50mcg PO daily- discontinued via patient transfer.  CAD: postop CABG 10/2, recovering in CTU, on medications, no chest pain, stable, monitored.  HTN: Controlled,  on antihypertensive medications.  Parkinson's: on meds, stable.          Kelsie Reece MD  Cell: [phone]  Office: [phone] Assessment  DMT2: 69y Male with DM T2 with hyperglycemia, A1C 7.2%, was on oral meds and insulin at home, now postop CABG on insulin, blood sugars elevated, patient did not receive basal insulin last night, no hypoglycemic episode, eating partial meals, comfortable.  Hypothyroidism: Patient has no hx thyroid dz, was not on any thyroid supplements, found  incidentally with TSH 6.22 and was started on synthroid 50mcg PO daily- discontinued via patient transfer.  CAD: postop CABG 10/2, recovering in CTU, on medications, no chest pain, stable, monitored.  HTN: Controlled,  on antihypertensive medications.  Parkinson's: on meds, stable.          Kelsie Reece MD  Cell: [phone]  Office: [phone]

## 2019-10-04 NOTE — PROGRESS NOTE ADULT - PROBLEM SELECTOR PLAN 2
TFTs: TSH 6.22, Free thyroxine 1.3.  Will continue Synthroid 50 mcg; Will continue to monitor and FU.  Suggest to repeat TFTs in 6 weeks.

## 2019-10-04 NOTE — CHART NOTE - NSCHARTNOTEFT_GEN_A_CORE
Nutrition Follow Up Note  Patient seen for: Malnutrition follow up     Source: RN, medical record, CTU team     Chart reviewed, events noted: 70 yo M PMHx CAD s/p stent x 1 (), T2DM, HTN, Parkinson's presenting with c/o 10/10 chest pain described as burning radiating from abdomen to midsternum x 1 day. Pt schedules for CABG tomorrow 10/2. Pt POD 2 form CABG x5.  Extubated, plan for transfer to step down     Diet : DASH    Patient reports: Pt seen, reports feeling well but in pain; RN made aware. Pt states appetite at breakfast was not good, ate cereal and a roll. RD reviewed increased nutrition needs for post op wound healing.  Reviewed and encouraged protein rich foods. Pt stats he is willing to try Glucerna x1 daily to help supplement PO intake in the post op phase. education deferred at this time due to Pt not feeling well with suboptimal PO intake.   Noted with elevated BG levels. Pt with known T2DM but also likely due to stress induced hyperglycemia.      PO intake :fair at breakfast      Source for PO intake: RN, medical record     Daily Weight in k.2 (10-03), Weight in k.3 (10-01), Weight in k.4 (10-01), Weight in k.1 (), Weight in k.4 ()  % Weight Change: Increased noted. Likely related to intraoperative fluid shifts.     Pertinent Medications: MEDICATIONS  (STANDING):  aspirin enteric coated 325 milliGRAM(s) Oral daily  atorvastatin 80 milliGRAM(s) Oral at bedtime  carbidopa/levodopa  25/100 2 Tablet(s) Oral three times a day  chlorhexidine 2% Cloths 1 Application(s) Topical <User Schedule>  dextrose 5%. 1000 milliLiter(s) (50 mL/Hr) IV Continuous <Continuous>  dextrose 50% Injectable 12.5 Gram(s) IV Push once  dextrose 50% Injectable 25 Gram(s) IV Push once  dextrose 50% Injectable 25 Gram(s) IV Push once  dextrose 50% Injectable 50 milliLiter(s) IV Push every 15 minutes  dextrose 50% Injectable 25 milliLiter(s) IV Push every 15 minutes  docusate sodium 100 milliGRAM(s) Oral three times a day  heparin  Injectable 5000 Unit(s) SubCutaneous every 8 hours  insulin glargine Injectable (LANTUS) 20 Unit(s) SubCutaneous at bedtime  insulin lispro (HumaLOG) corrective regimen sliding scale   SubCutaneous three times a day before meals  insulin lispro (HumaLOG) corrective regimen sliding scale   SubCutaneous at bedtime  insulin lispro Injectable (HumaLOG) 7 Unit(s) SubCutaneous three times a day before meals  metoprolol tartrate 25 milliGRAM(s) Oral every 8 hours  pantoprazole    Tablet 40 milliGRAM(s) Oral before breakfast  sodium chloride 0.9%. 1000 milliLiter(s) (10 mL/Hr) IV Continuous <Continuous>  torsemide 10 milliGRAM(s) Oral daily  trihexyphenidyl 2 milliGRAM(s) Oral three times a day    MEDICATIONS  (PRN):  dextrose 40% Gel 15 Gram(s) Oral once PRN Blood Glucose LESS THAN 70 milliGRAM(s)/deciliter  glucagon  Injectable 1 milliGRAM(s) IntraMuscular once PRN Glucose LESS THAN 70 milligrams/deciliter  oxyCODONE    5 mG/acetaminophen 325 mG 1 Tablet(s) Oral every 6 hours PRN Moderate Pain (4 - 6)  oxyCODONE    5 mG/acetaminophen 325 mG 2 Tablet(s) Oral every 6 hours PRN Severe Pain (7 - 10)    Pertinent Labs: 10-04 @ 01:16: Na 134<L>, BUN 39<H>, Cr 2.79<H>, <H>, K+ 4.0, Phos 3.5, Mg 2.4, Alk Phos 53, ALT/SGPT 5<L>, AST/SGOT 29, HbA1c --    Finger Sticks:  POCT Blood Glucose.: 171 mg/dL (10-04 @ 08:15)  POCT Blood Glucose.: 170 mg/dL (10-04 @ 06:42)  POCT Blood Glucose.: 201 mg/dL (10-03 @ 22:03)  POCT Blood Glucose.: 172 mg/dL (10-03 @ 17:34)  POCT Blood Glucose.: 100 mg/dL (10-03 @ 15:01)  POCT Blood Glucose.: 131 mg/dL (10-03 @ 13:59)  POCT Blood Glucose.: 120 mg/dL (10-03 @ 13:08)  POCT Blood Glucose.: 155 mg/dL (10-03 @ 10:59)      Skin per nursing documentation: intact  Edema: +1 generalized and +2 marvin leg edema     Estimated Needs:   [ ] no change since previous assessment  [X ] recalculated: based on 77.3kg.   calorie needs not increased   1.4-1.6gm pro/k-123gm protein/kg     Previous Nutrition Diagnosis: moderate malnutrition  Nutrition Diagnosis is: on going, care plan in place     New Nutrition Diagnosis: increased nutrient needs  Related to:  wound healing    As evidenced by: POD 2 from sternotomy.      Interventions:     Recommend  1. Change diet to Low NA, consistent CHO to help better control BG levels. Pt also being followed by endocrinology.   2. Add Glucerna x2 daily to help supplement PO intake.   3. Provide food preferences as requested by Pt/family within diet restrictions    4. Encourage PO intake during meal times   5. Reviewed menu ordering procedures   6. Will follow up with education as Pt amenable   7. Trend BG levels, renal indices, and electrolytes       Monitoring and Evaluation:     Continue to monitor Nutritional intake, Tolerance to diet prescription, weights, labs, skin integrity    RD remains available upon request and will follow up per protocol  Sarah Siegler RD, RDN, [facility] Pager #[phone]

## 2019-10-04 NOTE — PROGRESS NOTE ADULT - SUBJECTIVE AND OBJECTIVE BOX
ORLIN HARDIN   MRN#: [id number]     The patient is a 69y Male PMHx CAD s/p stent x 1 (2010), T2DM, HTN, Parkinson's presenting with c/o 10/10 chest pain described as burning radiating from abdomen to midsternum x 1 day., s/p C5L, who was seen, evaluated, & examined with the CTICU staff on rounds and later in the day with a multidisciplinary care plan formulated & implemented.  All available clinical, laboratory, radiographic, pharmacologic, and electrocardiographic data were reviewed & analyzed.      The patient was in the CTICU in critical condition secondary to persistent cardiopulmonary dysfunction, hemodynamically significant anemia/hypovolemia,  persistent cardiovascular dysfunction, CKD, & stress hyperglycemia.      Respiratory status required supplemental O2, the following of ABG’s with A-line monitoring, continuous pulse oximetry monitoring,  for support & to evaluate for & prevent further decompensation secondary to persistent cardiopulmonary dysfunction.      Invasive hemodynamic monitoring with  an A-line were required for the following of  continuous MAP/BP monitoring to ensure adequate cardiovascular support and to evaluate for & help prevent decompensation while receiving intermittent volume expansion, external epicardial pacing, blood transfusions, blood product transfusions, secondary to hemodynamically significant anemia/hypovolemia, cardiovascular dysfunction, bleeding induced anemia,  & non-infectious SIRS.       Metabolic stability, stress hyperglycemia, & infection prophylaxis required  the following of serial glucose levels to help achieve & maintain euglycemia.      Patient required more than the usual postoperative critical care management and I provided 35 minutes of non-continuous care to the patient.  Discussed at length with the CTICU staff and helped coordinate care.

## 2019-10-04 NOTE — PROGRESS NOTE ADULT - SUBJECTIVE AND OBJECTIVE BOX
Chief complaint  Patient is a 69y old  Male who presents with a chief complaint of Multivessel disease (04 Oct 2019 14:59)   Review of systems  Patient in bed, looks comfortable, no fever, no hypoglycemia.    Labs and Fingersticks  CAPILLARY BLOOD GLUCOSE  201 (03 Oct 2019 22:00)      POCT Blood Glucose.: 205 mg/dL (04 Oct 2019 11:56)  POCT Blood Glucose.: 171 mg/dL (04 Oct 2019 08:15)  POCT Blood Glucose.: 170 mg/dL (04 Oct 2019 06:42)  POCT Blood Glucose.: 201 mg/dL (03 Oct 2019 22:03)  POCT Blood Glucose.: 172 mg/dL (03 Oct 2019 17:34)      Anion Gap, Serum: 11 (10-04 @ 01:16)  Anion Gap, Serum: 12 (10-03 @ 01:40)  Anion Gap, Serum: 17 (10-02 @ 16:22)      Calcium, Total Serum: 8.2 <L> (10-04 @ 01:16)  Calcium, Total Serum: 8.8 (10-03 @ 01:40)  Calcium, Total Serum: 8.7 (10-02 @ 16:22)  Albumin, Serum: 2.8 <L> (10-04 @ 01:16)  Albumin, Serum: 3.2 <L> (10-03 @ 01:40)  Albumin, Serum: 3.1 <L> (10-02 @ 16:22)    Alanine Aminotransferase (ALT/SGPT): 5 <L> (10-04 @ 01:16)  Alanine Aminotransferase (ALT/SGPT): 5 <L> (10-03 @ 01:40)  Alanine Aminotransferase (ALT/SGPT): 5 <L> (10-02 @ 16:22)  Alkaline Phosphatase, Serum: 53 (10-04 @ 01:16)  Alkaline Phosphatase, Serum: 56 (10-03 @ 01:40)  Alkaline Phosphatase, Serum: 54 (10-02 @ 16:22)  Aspartate Aminotransferase (AST/SGOT): 29 (10-04 @ 01:16)  Aspartate Aminotransferase (AST/SGOT): 45 <H> (10-03 @ 01:40)  Aspartate Aminotransferase (AST/SGOT): 34 (10-02 @ 16:22)        10-04    134<L>  |  101  |  39<H>  ----------------------------<  192<H>  4.0   |  22  |  2.79<H>    Ca    8.2<L>      04 Oct 2019 01:16  Phos  3.5     10-04  Mg     2.4     10-04    TPro  5.8<L>  /  Alb  2.8<L>  /  TBili  0.6  /  DBili  x   /  AST  29  /  ALT  5<L>  /  AlkPhos  53  10-04                        9.5    17.16 )-----------( 260      ( 04 Oct 2019 01:16 )             27.4     Medications  MEDICATIONS  (STANDING):  aspirin enteric coated 325 milliGRAM(s) Oral daily  atorvastatin 80 milliGRAM(s) Oral at bedtime  carbidopa/levodopa  25/100 2 Tablet(s) Oral three times a day  chlorhexidine 2% Cloths 1 Application(s) Topical <User Schedule>  dextrose 5%. 1000 milliLiter(s) (50 mL/Hr) IV Continuous <Continuous>  dextrose 50% Injectable 12.5 Gram(s) IV Push once  dextrose 50% Injectable 25 Gram(s) IV Push once  dextrose 50% Injectable 25 Gram(s) IV Push once  dextrose 50% Injectable 50 milliLiter(s) IV Push every 15 minutes  dextrose 50% Injectable 25 milliLiter(s) IV Push every 15 minutes  docusate sodium 100 milliGRAM(s) Oral three times a day  heparin  Injectable 5000 Unit(s) SubCutaneous every 8 hours  insulin glargine Injectable (LANTUS) 20 Unit(s) SubCutaneous at bedtime  insulin lispro (HumaLOG) corrective regimen sliding scale   SubCutaneous three times a day before meals  insulin lispro (HumaLOG) corrective regimen sliding scale   SubCutaneous at bedtime  insulin lispro Injectable (HumaLOG) 7 Unit(s) SubCutaneous three times a day before meals  metoprolol tartrate 25 milliGRAM(s) Oral every 8 hours  pantoprazole    Tablet 40 milliGRAM(s) Oral before breakfast  sodium chloride 0.9%. 1000 milliLiter(s) (10 mL/Hr) IV Continuous <Continuous>  torsemide 10 milliGRAM(s) Oral daily  trihexyphenidyl 2 milliGRAM(s) Oral three times a day      Physical Exam  General: Patient comfortable in bed  Vital Signs Last 12 Hrs  T(F): 98.2 (10-04-19 @ 15:00), Max: 100 (10-04-19 @ 13:00)  HR: 82 (10-04-19 @ 15:00) (68 - 95)  BP: 127/67 (10-04-19 @ 15:00) (114/59 - 144/65)  BP(mean): 91 (10-04-19 @ 15:00) (80 - 107)  RR: 21 (10-04-19 @ 15:00) (19 - 24)  SpO2: 100% (10-04-19 @ 15:00) (100% - 100%)  Neck: No palpable thyroid nodules.  CVS: S1S2, No murmurs  Respiratory: No wheezing, no crepitations  GI: Abdomen soft, bowel sounds positive  Musculoskeletal:  edema lower extremities.   Skin: No skin rashes, no ecchymosis    Diagnostics    Free Thyroxine, Serum: AM Sched. Collection: 30-Sep-2019 06:00 (09-29 @ 10:47) Chief complaint  Patient is a 69y old  Male who presents with a chief complaint of Multivessel disease (04 Oct 2019 14:59)   Review of systems  Patient in bed, looks comfortable, no fever,  no hypoglycemia.    Labs and Fingersticks  CAPILLARY BLOOD GLUCOSE  201 (03 Oct 2019 22:00)      POCT Blood Glucose.: 205 mg/dL (04 Oct 2019 11:56)  POCT Blood Glucose.: 171 mg/dL (04 Oct 2019 08:15)  POCT Blood Glucose.: 170 mg/dL (04 Oct 2019 06:42)  POCT Blood Glucose.: 201 mg/dL (03 Oct 2019 22:03)  POCT Blood Glucose.: 172 mg/dL (03 Oct 2019 17:34)      Anion Gap, Serum: 11 (10-04 @ 01:16)  Anion Gap, Serum: 12 (10-03 @ 01:40)  Anion Gap, Serum: 17 (10-02 @ 16:22)      Calcium, Total Serum: 8.2 <L> (10-04 @ 01:16)  Calcium, Total Serum: 8.8 (10-03 @ 01:40)  Calcium, Total Serum: 8.7 (10-02 @ 16:22)  Albumin, Serum: 2.8 <L> (10-04 @ 01:16)  Albumin, Serum: 3.2 <L> (10-03 @ 01:40)  Albumin, Serum: 3.1 <L> (10-02 @ 16:22)    Alanine Aminotransferase (ALT/SGPT): 5 <L> (10-04 @ 01:16)  Alanine Aminotransferase (ALT/SGPT): 5 <L> (10-03 @ 01:40)  Alanine Aminotransferase (ALT/SGPT): 5 <L> (10-02 @ 16:22)  Alkaline Phosphatase, Serum: 53 (10-04 @ 01:16)  Alkaline Phosphatase, Serum: 56 (10-03 @ 01:40)  Alkaline Phosphatase, Serum: 54 (10-02 @ 16:22)  Aspartate Aminotransferase (AST/SGOT): 29 (10-04 @ 01:16)  Aspartate Aminotransferase (AST/SGOT): 45 <H> (10-03 @ 01:40)  Aspartate Aminotransferase (AST/SGOT): 34 (10-02 @ 16:22)        10-04    134<L>  |  101  |  39<H>  ----------------------------<  192<H>  4.0   |  22  |  2.79<H>    Ca    8.2<L>      04 Oct 2019 01:16  Phos  3.5     10-04  Mg     2.4     10-04    TPro  5.8<L>  /  Alb  2.8<L>  /  TBili  0.6  /  DBili  x   /  AST  29  /  ALT  5<L>  /  AlkPhos  53  10-04                        9.5    17.16 )-----------( 260      ( 04 Oct 2019 01:16 )             27.4     Medications  MEDICATIONS  (STANDING):  aspirin enteric coated 325 milliGRAM(s) Oral daily  atorvastatin 80 milliGRAM(s) Oral at bedtime  carbidopa/levodopa  25/100 2 Tablet(s) Oral three times a day  chlorhexidine 2% Cloths 1 Application(s) Topical <User Schedule>  dextrose 5%. 1000 milliLiter(s) (50 mL/Hr) IV Continuous <Continuous>  dextrose 50% Injectable 12.5 Gram(s) IV Push once  dextrose 50% Injectable 25 Gram(s) IV Push once  dextrose 50% Injectable 25 Gram(s) IV Push once  dextrose 50% Injectable 50 milliLiter(s) IV Push every 15 minutes  dextrose 50% Injectable 25 milliLiter(s) IV Push every 15 minutes  docusate sodium 100 milliGRAM(s) Oral three times a day  heparin  Injectable 5000 Unit(s) SubCutaneous every 8 hours  insulin glargine Injectable (LANTUS) 20 Unit(s) SubCutaneous at bedtime  insulin lispro (HumaLOG) corrective regimen sliding scale   SubCutaneous three times a day before meals  insulin lispro (HumaLOG) corrective regimen sliding scale   SubCutaneous at bedtime  insulin lispro Injectable (HumaLOG) 7 Unit(s) SubCutaneous three times a day before meals  metoprolol tartrate 25 milliGRAM(s) Oral every 8 hours  pantoprazole    Tablet 40 milliGRAM(s) Oral before breakfast  sodium chloride 0.9%. 1000 milliLiter(s) (10 mL/Hr) IV Continuous <Continuous>  torsemide 10 milliGRAM(s) Oral daily  trihexyphenidyl 2 milliGRAM(s) Oral three times a day      Physical Exam  General: Patient comfortable in bed  Vital Signs Last 12 Hrs  T(F): 98.2 (10-04-19 @ 15:00), Max: 100 (10-04-19 @ 13:00)  HR: 82 (10-04-19 @ 15:00) (68 - 95)  BP: 127/67 (10-04-19 @ 15:00) (114/59 - 144/65)  BP(mean): 91 (10-04-19 @ 15:00) (80 - 107)  RR: 21 (10-04-19 @ 15:00) (19 - 24)  SpO2: 100% (10-04-19 @ 15:00) (100% - 100%)  Neck: No palpable thyroid nodules.  CVS: S1S2, No murmurs  Respiratory: No wheezing, no crepitations  GI: Abdomen soft, bowel sounds positive  Musculoskeletal:  edema lower extremities.   Skin: No skin rashes, no ecchymosis    Diagnostics    Free Thyroxine, Serum: AM Sched. Collection: 30-Sep-2019 06:00 (09-29 @ 10:47)

## 2019-10-04 NOTE — PROGRESS NOTE ADULT - SUBJECTIVE AND OBJECTIVE BOX
[facility] [phone]        Patient seen and examined in the chair.  He was in good spirits         MEDICATIONS  (STANDING):  aspirin enteric coated 325 milliGRAM(s) Oral daily  atorvastatin 80 milliGRAM(s) Oral at bedtime  carbidopa/levodopa  25/100 2 Tablet(s) Oral three times a day  chlorhexidine 2% Cloths 1 Application(s) Topical <User Schedule>  dextrose 5%. 1000 milliLiter(s) (50 mL/Hr) IV Continuous <Continuous>  dextrose 50% Injectable 12.5 Gram(s) IV Push once  dextrose 50% Injectable 25 Gram(s) IV Push once  dextrose 50% Injectable 25 Gram(s) IV Push once  dextrose 50% Injectable 50 milliLiter(s) IV Push every 15 minutes  dextrose 50% Injectable 25 milliLiter(s) IV Push every 15 minutes  docusate sodium 100 milliGRAM(s) Oral three times a day  heparin  Injectable 5000 Unit(s) SubCutaneous every 8 hours  insulin glargine Injectable (LANTUS) 20 Unit(s) SubCutaneous at bedtime  insulin lispro (HumaLOG) corrective regimen sliding scale   SubCutaneous three times a day before meals  insulin lispro (HumaLOG) corrective regimen sliding scale   SubCutaneous at bedtime  insulin lispro Injectable (HumaLOG) 7 Unit(s) SubCutaneous three times a day before meals  metoprolol tartrate 25 milliGRAM(s) Oral every 8 hours  pantoprazole    Tablet 40 milliGRAM(s) Oral before breakfast  sodium chloride 0.9%. 1000 milliLiter(s) (10 mL/Hr) IV Continuous <Continuous>  torsemide 10 milliGRAM(s) Oral daily  trihexyphenidyl 2 milliGRAM(s) Oral three times a day      VITAL:  T(C): , Max: 37.7 (10-03-19 @ 20:00)  T(F): , Max: 99.9 (10-03-19 @ 20:00)  HR: 70 (10-04-19 @ 08:00)  BP: 124/59 (10-04-19 @ 08:00)  BP(mean): 85 (10-04-19 @ 08:00)  RR: 20 (10-04-19 @ 08:00)  SpO2: 100% (10-04-19 @ 08:00)  Wt(kg): --    I and O's:    10-03 @ 07:01  -  10-04 @ 07:00  --------------------------------------------------------  IN: 493 mL / OUT: 1360 mL / NET: -867 mL    10-04 @ 07:01  -  10-04 @ 08:33  --------------------------------------------------------  IN: 10 mL / OUT: 30 mL / NET: -20 mL          PHYSICAL EXAM:    Constitutional: NAD  Neck:  No JVD  Respiratory: CTAB/L  Cardiovascular: S1 and S2  Gastrointestinal: BS+, soft, NT/ND  Extremities: +  peripheral edema  Neurological: A/O x 3, no focal deficits  Psychiatric: Normal mood, normal affect  : No Javier  Skin: No rashes  Access: Not applicable    LABS:                        9.5    17.16 )-----------( 260      ( 04 Oct 2019 01:16 )             27.4     10-04    134<L>  |  101  |  39<H>  ----------------------------<  192<H>  4.0   |  22  |  2.79<H>    Ca    8.2<L>      04 Oct 2019 01:16  Phos  3.5     10-04  Mg     2.4     10-04    TPro  5.8<L>  /  Alb  2.8<L>  /  TBili  0.6  /  DBili  x   /  AST  29  /  ALT  5<L>  /  AlkPhos  53  10-04          Urine Studies:          RADIOLOGY & ADDITIONAL STUDIES: [facility] [phone]        Patient seen and examined in the chair.  He was in good spirits         MEDICATIONS  (STANDING):  aspirin enteric coated 325 milliGRAM(s) Oral daily  atorvastatin 80 milliGRAM(s) Oral at bedtime  carbidopa/levodopa  25/100 2 Tablet(s) Oral three times a day  chlorhexidine 2% Cloths 1 Application(s) Topical <User Schedule>  dextrose 5%. 1000 milliLiter(s) (50 mL/Hr) IV Continuous <Continuous>  dextrose 50% Injectable 12.5 Gram(s) IV Push once  dextrose 50% Injectable 25 Gram(s) IV Push once  dextrose 50% Injectable 25 Gram(s) IV Push once  dextrose 50% Injectable 50 milliLiter(s) IV Push every 15 minutes  dextrose 50% Injectable 25 milliLiter(s) IV Push every 15 minutes  docusate sodium 100 milliGRAM(s) Oral three times a day  heparin  Injectable 5000 Unit(s) SubCutaneous every 8 hours  insulin glargine Injectable (LANTUS) 20 Unit(s) SubCutaneous at bedtime  insulin lispro (HumaLOG) corrective regimen sliding scale   SubCutaneous three times a day before meals  insulin lispro (HumaLOG) corrective regimen sliding scale   SubCutaneous at bedtime  insulin lispro Injectable (HumaLOG) 7 Unit(s) SubCutaneous three times a day before meals  metoprolol tartrate 25 milliGRAM(s) Oral every 8 hours  pantoprazole    Tablet 40 milliGRAM(s) Oral before breakfast  sodium chloride 0.9%. 1000 milliLiter(s) (10 mL/Hr) IV Continuous <Continuous>  torsemide 10 milliGRAM(s) Oral daily  trihexyphenidyl 2 milliGRAM(s) Oral three times a day      VITAL:  T(C): , Max: 37.7 (10-03-19 @ 20:00)  T(F): , Max: 99.9 (10-03-19 @ 20:00)  HR: 70 (10-04-19 @ 08:00)  BP: 124/59 (10-04-19 @ 08:00)  BP(mean): 85 (10-04-19 @ 08:00)  RR: 20 (10-04-19 @ 08:00)  SpO2: 100% (10-04-19 @ 08:00)  Wt(kg): --    I and O's:    10-03 @ 07:01  -  10-04 @ 07:00  --------------------------------------------------------  IN: 493 mL / OUT: 1360 mL / NET: -867 mL    10-04 @ 07:01  -  10-04 @ 08:33  --------------------------------------------------------  IN: 10 mL / OUT: 30 mL / NET: -20 mL          PHYSICAL EXAM:    Constitutional: NAD  Neck:  No JVD  Respiratory: CTAB/L  Cardiovascular: S1 and S2  Gastrointestinal: BS+, soft, NT/ND  Extremities: +  peripheral edema  Neurological: A/O x 3, no focal deficits  Psychiatric: Normal mood, normal affect  : + Javier  Skin: No rashes  Access: Not applicable    LABS:                        9.5    17.16 )-----------( 260      ( 04 Oct 2019 01:16 )             27.4     10-04    134<L>  |  101  |  39<H>  ----------------------------<  192<H>  4.0   |  22  |  2.79<H>    Ca    8.2<L>      04 Oct 2019 01:16  Phos  3.5     10-04  Mg     2.4     10-04    TPro  5.8<L>  /  Alb  2.8<L>  /  TBili  0.6  /  DBili  x   /  AST  29  /  ALT  5<L>  /  AlkPhos  53  10-04          Urine Studies:          RADIOLOGY & ADDITIONAL STUDIES:

## 2019-10-04 NOTE — PROGRESS NOTE ADULT - SUBJECTIVE AND OBJECTIVE BOX
[facility]      Seen earlier today, and examined.  - Today, patient is without complaints.           *****MEDICATIONS: Current medication reviewed and documented.    MEDICATIONS  (STANDING):  acetaminophen   Tablet .. 325 milliGRAM(s) Oral once  aspirin enteric coated 325 milliGRAM(s) Oral daily  atorvastatin 80 milliGRAM(s) Oral at bedtime  carbidopa/levodopa  25/100 2 Tablet(s) Oral three times a day  chlorhexidine 2% Cloths 1 Application(s) Topical <User Schedule>  dextrose 5%. 1000 milliLiter(s) (50 mL/Hr) IV Continuous <Continuous>  dextrose 50% Injectable 12.5 Gram(s) IV Push once  dextrose 50% Injectable 25 Gram(s) IV Push once  dextrose 50% Injectable 25 Gram(s) IV Push once  dextrose 50% Injectable 50 milliLiter(s) IV Push every 15 minutes  dextrose 50% Injectable 25 milliLiter(s) IV Push every 15 minutes  docusate sodium 100 milliGRAM(s) Oral three times a day  heparin  Injectable 5000 Unit(s) SubCutaneous every 8 hours  insulin glargine Injectable (LANTUS) 28 Unit(s) SubCutaneous at bedtime  insulin lispro (HumaLOG) corrective regimen sliding scale   SubCutaneous three times a day before meals  insulin lispro (HumaLOG) corrective regimen sliding scale   SubCutaneous at bedtime  insulin lispro Injectable (HumaLOG) 10 Unit(s) SubCutaneous three times a day before meals  levothyroxine 50 MICROGram(s) Oral daily  metoprolol tartrate 25 milliGRAM(s) Oral every 8 hours  pantoprazole    Tablet 40 milliGRAM(s) Oral before breakfast  sodium chloride 0.9%. 1000 milliLiter(s) (10 mL/Hr) IV Continuous <Continuous>  torsemide 10 milliGRAM(s) Oral daily  trihexyphenidyl 2 milliGRAM(s) Oral three times a day    MEDICATIONS  (PRN):  dextrose 40% Gel 15 Gram(s) Oral once PRN Blood Glucose LESS THAN 70 milliGRAM(s)/deciliter  glucagon  Injectable 1 milliGRAM(s) IntraMuscular once PRN Glucose LESS THAN 70 milligrams/deciliter  oxyCODONE    5 mG/acetaminophen 325 mG 1 Tablet(s) Oral every 6 hours PRN Moderate Pain (4 - 6)  oxyCODONE    5 mG/acetaminophen 325 mG 2 Tablet(s) Oral every 6 hours PRN Severe Pain (7 - 10)          ***** VITAL SIGNS:  T(F): 98.2 (10-04-19 @ 20:00), Max: 100 (10-04-19 @ 13:00)  HR: 71 (10-04-19 @ 23:00) (68 - 95)  BP: 109/66 (10-04-19 @ 23:00) (109/66 - 148/67)  RR: 17 (10-04-19 @ 23:00) (15 - 27)  SpO2: 100% (10-04-19 @ 23:00) (99% - 100%)  Wt(kg): --  ,   I&O's Summary    03 Oct 2019 07:  -  04 Oct 2019 07:00  --------------------------------------------------------  IN: 493 mL / OUT: 1360 mL / NET: -867 mL    04 Oct 2019 07:01  -  04 Oct 2019 23:16  --------------------------------------------------------  IN: 420 mL / OUT: 670 mL / NET: -250 mL             *****PHYSICAL EXAM: Alert oriented x 2   Attention comprehension are fair. Able to name, repeat,  without any difficulty.   Able to follow 1-2 step commands.     EOMI fundi not visualized,  VFF to confrontration  No facial asymmetry   Tongue is midline   Palate elevates symmetrically   Moving all 4 ext symmetrically no pronator drift  left resting tremor noted   + cogwheeling     left lower ext with limited mvt with respect to hamstrings.  Gait : not assessed.          *****LAB AND IMAGIN.5    17.16 )-----------( 260      ( 04 Oct 2019 01:16 )             27.4               10-04    134<L>  |  101  |  39<H>  ----------------------------<  192<H>  4.0   |  22  |  2.79<H>    Ca    8.2<L>      04 Oct 2019 01:16  Phos  3.5     10-04  Mg     2.4     10-04    TPro  5.8<L>  /  Alb  2.8<L>  /  TBili  0.6  /  DBili  x   /  AST  29  /  ALT  5<L>  /  AlkPhos  53  10-04    PT/INR - ( 03 Oct 2019 01:40 )   PT: 12.6 sec;   INR: 1.09 ratio         PTT - ( 03 Oct 2019 01:40 )  PTT:28.3 sec                 ABG - ( 03 Oct 2019 12:03 )  pH, Arterial: 7.41  pH, Blood: x     /  pCO2: 38    /  pO2: 116   / HCO3: 24    / Base Excess: -.3   /  SaO2: 99                Urinalysis Basic - ( 04 Oct 2019 21:58 )    Color: Light Yellow / Appearance: Clear / S.018 / pH: x  Gluc: x / Ketone: Negative  / Bili: Negative / Urobili: Negative   Blood: x / Protein: 100 / Nitrite: Negative   Leuk Esterase: Negative / RBC: 5 /hpf / WBC 2 /HPF   Sq Epi: x / Non Sq Epi: 1 /hpf / Bacteria: Negative      [All pertinent recent Imaging/Reports reviewed]           *****A S S E S S M E N T   A N D   P L A N :  68 yo M PMHx CAD s/p stent x 1 (), T2DM, HTN, Parkinson's presenting with c/o 10/10 chest pain described as burning radiating from abdomen to midsternum x 1 day. Patient reports symptoms started at 2 pm and resolved after 1 hour. Chest pain began after he ate lunch and was walking up a flight of stairs. He reports chest pain was associated with dyspnea, diaphoresis, and nausea. At baseline, he ambulates with cane/walker. Takes medications daily including aspirin and prasugrel. On ROS, reports chronic left leg swelling x 1-2 years.        Problem/Recommendations 1: Dizziness of unclear etiology   s/p cabg,  day 1   appreciate ct care.    continue asa/atorvastatin for secondary prophy  Problem/Recommendations 2: Parkinson's stable exam  continue current regime        Thank you for allowing me to participate in the care of this patient. Please do not hesitate to call me if you have any  questions.        ________________  Lily Fang MD  [facility] ([facility]  [phone]      33 minutes spent on total encounter; more than 50 % of the visit was  spent counseling about plan of care, compliance to diet/exercise and medication regimen and or  coordinating care by the attending physician.      It is advised that stroke patients follow up with ZACH Albarran @ [phone] in 1- 2 weeks.   Others please follow up with Dr. Michael Nissenbaum [phone]

## 2019-10-04 NOTE — PROGRESS NOTE ADULT - SUBJECTIVE AND OBJECTIVE BOX
Subjective: pt seen and examined, no complaints, ROS - .         aspirin enteric coated 325 milliGRAM(s) Oral daily  atorvastatin 80 milliGRAM(s) Oral at bedtime  carbidopa/levodopa  25/100 2 Tablet(s) Oral three times a day  chlorhexidine 2% Cloths 1 Application(s) Topical <User Schedule>  dextrose 40% Gel 15 Gram(s) Oral once PRN  dextrose 5%. 1000 milliLiter(s) IV Continuous <Continuous>  dextrose 50% Injectable 12.5 Gram(s) IV Push once  dextrose 50% Injectable 25 Gram(s) IV Push once  dextrose 50% Injectable 25 Gram(s) IV Push once  dextrose 50% Injectable 50 milliLiter(s) IV Push every 15 minutes  dextrose 50% Injectable 25 milliLiter(s) IV Push every 15 minutes  docusate sodium 100 milliGRAM(s) Oral three times a day  glucagon  Injectable 1 milliGRAM(s) IntraMuscular once PRN  heparin  Injectable 5000 Unit(s) SubCutaneous every 8 hours  insulin lispro (HumaLOG) corrective regimen sliding scale   SubCutaneous three times a day before meals  insulin lispro (HumaLOG) corrective regimen sliding scale   SubCutaneous at bedtime  metoprolol tartrate 25 milliGRAM(s) Oral every 12 hours  oxyCODONE    5 mG/acetaminophen 325 mG 1 Tablet(s) Oral every 6 hours PRN  oxyCODONE    5 mG/acetaminophen 325 mG 2 Tablet(s) Oral every 6 hours PRN  pantoprazole    Tablet 40 milliGRAM(s) Oral before breakfast  sodium chloride 0.9%. 1000 milliLiter(s) IV Continuous <Continuous>  trihexyphenidyl 2 milliGRAM(s) Oral three times a day                            9.5    17.16 )-----------( 260      ( 04 Oct 2019 01:16 )             27.4       Hemoglobin: 9.5 g/dL (10-04 @ 01:16)  Hemoglobin: 10.3 g/dL (10-03 @ 01:40)  Hemoglobin: 8.5 g/dL (10-02 @ 16:22)  Hemoglobin: 11.1 g/dL (10-01 @ 06:54)  Hemoglobin: 12.3 g/dL (09-30 @ 05:55)      10-04    134<L>  |  101  |  39<H>  ----------------------------<  192<H>  4.0   |  22  |  2.79<H>    Ca    8.2<L>      04 Oct 2019 01:16  Phos  3.5     10-04  Mg     2.4     10-04    TPro  5.8<L>  /  Alb  2.8<L>  /  TBili  0.6  /  DBili  x   /  AST  29  /  ALT  5<L>  /  AlkPhos  53  10-04    Creatinine Trend: 2.79<--, 2.61<--, 2.23<--, 2.70<--, 2.34<--, 2.47<--    COAGS:     CARDIAC MARKERS ( 02 Oct 2019 16:22 )  x     / x     / 310 U/L / x     / 26.1 ng/mL        T(C): 37.4 (10-04-19 @ 04:00), Max: 37.7 (10-03-19 @ 20:00)  HR: 76 (10-04-19 @ 05:00) (62 - 85)  BP: 121/57 (10-04-19 @ 05:00) (119/57 - 152/68)  RR: 20 (10-04-19 @ 05:00) (11 - 38)  SpO2: 100% (10-04-19 @ 05:00) (96% - 100%)  Wt(kg): --    I&O's Summary    02 Oct 2019 07:01  -  03 Oct 2019 07:00  --------------------------------------------------------  IN: 2990.1 mL / OUT: 2476 mL / NET: 514.1 mL    03 Oct 2019 07:01  -  04 Oct 2019 05:25  --------------------------------------------------------  IN: 473 mL / OUT: 1225 mL / NET: -752 mL         Appearance: Normal	  HEENT:   Normal oral mucosa, PERRL, EOMI	  Lymphatic: No lymphadenopathy , no edema  Cardiovascular: Normal S1 S2, No JVD, No murmurs , Peripheral pulses palpable 2+ bilaterally  Respiratory: Lungs clear to auscultation, normal effort 	  Gastrointestinal:  Soft, Non-tender, + BS	  Skin: No rashes, No ecchymoses, No cyanosis, warm to touch  Musculoskeletal: Normal range of motion, normal strength  Psychiatry:  Mood & affect appropriate    TELEMETRY: 	  nsr       DIAGNOSTIC TESTING:  [ ] Echocardiogram: < from: Intra-Operative Transesophageal Echo (10.02.19 @ 13:30) >  Conclusions:  1. Mild segmental left ventricular systolic dysfunction.  Apicl hypokinesis  2. Moderate diastolic dysfunction (Stage II).  Confirmed on  10/2/2019 - 14:16:07 by Daniel Duran M.D.  ------------------------------------------------------------------------    < end of copied text >    [ ]  Catheterization:  [ ] Stress Test:    OTHER: 	        ASSESSMENT/PLAN:   68 yo M PMHx CAD s/p stent x 1 (2010), T2DM, HTN, Parkinson's, history of meningioma, admitted with NSTEMI, TTE with mild segmental LV dysfxn, cath with multivessel CAD, s/p C5L LIMA-LAD, SVG-RPDA, SVG-RPL, SVG-Ramus, SVG-OM 10/2    ASA, statin    GI / DVT prophylaxis.   keep K>4, mag >2.0   Tolerating BB,   cont home dose of parkinson meds  physical therapy follow up   D/c chest tubes per cts   D/W Dr Feliz

## 2019-10-04 NOTE — PROGRESS NOTE ADULT - ASSESSMENT
70 yo M PMHx CAD s/p stent x 1 (2010), T2DM, HTN, Parkinson's and CKD stage 3b presents with chest pain. S/P cath with MVD- POD #2  CABG  Subnephrotic range proteinuria     1. Renal - RXP5m-6.  Trend BMP and urine output(which is great).    Creatinine rise is expected and hopefully will plateau in am.  Torsemide was started     2. CVS   Not on pressors at present.  Cont Lopressor     3. Pulm - Incentive spirometry 68 yo M PMHx CAD s/p stent x 1 (2010), T2DM, HTN, Parkinson's and CKD stage 3b presents with chest pain. S/P cath with MVD- POD #2  CABG  Subnephrotic range proteinuria     1. Renal - SHG0w-1.  Trend BMP and urine output(which is great).    Creatinine rise is expected and hopefully will plateau in am.  Torsemide was started     2. CVS   Not on pressors at present.  Cont Lopressor     3. Pulm - Incentive spirometry    4. Should be able to dc johnson soon

## 2019-10-05 LAB
ALBUMIN SERPL ELPH-MCNC: 2.9 G/DL — LOW (ref 3.3–5)
ALP SERPL-CCNC: 54 U/L — SIGNIFICANT CHANGE UP (ref 40–120)
ALT FLD-CCNC: <5 U/L — LOW (ref 10–45)
ANION GAP SERPL CALC-SCNC: 9 MMOL/L — SIGNIFICANT CHANGE UP (ref 5–17)
AST SERPL-CCNC: 16 U/L — SIGNIFICANT CHANGE UP (ref 10–40)
BILIRUB SERPL-MCNC: 0.4 MG/DL — SIGNIFICANT CHANGE UP (ref 0.2–1.2)
BUN SERPL-MCNC: 48 MG/DL — HIGH (ref 7–23)
CALCIUM SERPL-MCNC: 8.9 MG/DL — SIGNIFICANT CHANGE UP (ref 8.4–10.5)
CHLORIDE SERPL-SCNC: 99 MMOL/L — SIGNIFICANT CHANGE UP (ref 96–108)
CO2 SERPL-SCNC: 26 MMOL/L — SIGNIFICANT CHANGE UP (ref 22–31)
CREAT SERPL-MCNC: 3.03 MG/DL — HIGH (ref 0.5–1.3)
GLUCOSE SERPL-MCNC: 136 MG/DL — HIGH (ref 70–99)
HCT VFR BLD CALC: 25.3 % — LOW (ref 39–50)
HGB BLD-MCNC: 8.7 G/DL — LOW (ref 13–17)
MAGNESIUM SERPL-MCNC: 2.3 MG/DL — SIGNIFICANT CHANGE UP (ref 1.6–2.6)
MCHC RBC-ENTMCNC: 31.5 PG — SIGNIFICANT CHANGE UP (ref 27–34)
MCHC RBC-ENTMCNC: 34.4 GM/DL — SIGNIFICANT CHANGE UP (ref 32–36)
MCV RBC AUTO: 91.7 FL — SIGNIFICANT CHANGE UP (ref 80–100)
NRBC # BLD: 0 /100 WBCS — SIGNIFICANT CHANGE UP (ref 0–0)
PHOSPHATE SERPL-MCNC: 3.7 MG/DL — SIGNIFICANT CHANGE UP (ref 2.5–4.5)
PLATELET # BLD AUTO: 209 K/UL — SIGNIFICANT CHANGE UP (ref 150–400)
POTASSIUM SERPL-MCNC: 3.8 MMOL/L — SIGNIFICANT CHANGE UP (ref 3.5–5.3)
POTASSIUM SERPL-MCNC: 4 MMOL/L — SIGNIFICANT CHANGE UP (ref 3.5–5.3)
POTASSIUM SERPL-SCNC: 3.8 MMOL/L — SIGNIFICANT CHANGE UP (ref 3.5–5.3)
POTASSIUM SERPL-SCNC: 4 MMOL/L — SIGNIFICANT CHANGE UP (ref 3.5–5.3)
PROT SERPL-MCNC: 5.8 G/DL — LOW (ref 6–8.3)
RBC # BLD: 2.76 M/UL — LOW (ref 4.2–5.8)
RBC # FLD: 14.6 % — HIGH (ref 10.3–14.5)
SODIUM SERPL-SCNC: 134 MMOL/L — LOW (ref 135–145)
T4 FREE SERPL-MCNC: 1.1 NG/DL — SIGNIFICANT CHANGE UP (ref 0.9–1.8)
WBC # BLD: 13.68 K/UL — HIGH (ref 3.8–10.5)
WBC # FLD AUTO: 13.68 K/UL — HIGH (ref 3.8–10.5)

## 2019-10-05 PROCEDURE — 93010 ELECTROCARDIOGRAM REPORT: CPT

## 2019-10-05 PROCEDURE — 99291 CRITICAL CARE FIRST HOUR: CPT

## 2019-10-05 PROCEDURE — 71045 X-RAY EXAM CHEST 1 VIEW: CPT | Mod: 26

## 2019-10-05 RX ORDER — FERROUS SULFATE 325(65) MG
325 TABLET ORAL DAILY
Refills: 0 | Status: DISCONTINUED | OUTPATIENT
Start: 2019-10-05 | End: 2019-10-15

## 2019-10-05 RX ORDER — MAGNESIUM SULFATE 500 MG/ML
2 VIAL (ML) INJECTION ONCE
Refills: 0 | Status: COMPLETED | OUTPATIENT
Start: 2019-10-05 | End: 2019-10-05

## 2019-10-05 RX ORDER — POTASSIUM CHLORIDE 20 MEQ
20 PACKET (EA) ORAL ONCE
Refills: 0 | Status: COMPLETED | OUTPATIENT
Start: 2019-10-05 | End: 2019-10-05

## 2019-10-05 RX ORDER — AMIODARONE HYDROCHLORIDE 400 MG/1
400 TABLET ORAL EVERY 8 HOURS
Refills: 0 | Status: DISCONTINUED | OUTPATIENT
Start: 2019-10-05 | End: 2019-10-07

## 2019-10-05 RX ORDER — ERYTHROPOIETIN 10000 [IU]/ML
4000 INJECTION, SOLUTION INTRAVENOUS; SUBCUTANEOUS
Refills: 0 | Status: DISCONTINUED | OUTPATIENT
Start: 2019-10-05 | End: 2019-10-15

## 2019-10-05 RX ORDER — AMIODARONE HYDROCHLORIDE 400 MG/1
TABLET ORAL
Refills: 0 | Status: DISCONTINUED | OUTPATIENT
Start: 2019-10-05 | End: 2019-10-07

## 2019-10-05 RX ORDER — FOLIC ACID 0.8 MG
1 TABLET ORAL DAILY
Refills: 0 | Status: DISCONTINUED | OUTPATIENT
Start: 2019-10-05 | End: 2019-10-15

## 2019-10-05 RX ORDER — FUROSEMIDE 40 MG
40 TABLET ORAL ONCE
Refills: 0 | Status: COMPLETED | OUTPATIENT
Start: 2019-10-05 | End: 2019-10-05

## 2019-10-05 RX ADMIN — Medication 2 MILLIGRAM(S): at 05:42

## 2019-10-05 RX ADMIN — Medication 10 UNIT(S): at 12:27

## 2019-10-05 RX ADMIN — OXYCODONE AND ACETAMINOPHEN 1 TABLET(S): 5; 325 TABLET ORAL at 14:50

## 2019-10-05 RX ADMIN — Medication 10 MILLIGRAM(S): at 05:43

## 2019-10-05 RX ADMIN — OXYCODONE AND ACETAMINOPHEN 1 TABLET(S): 5; 325 TABLET ORAL at 04:00

## 2019-10-05 RX ADMIN — HEPARIN SODIUM 5000 UNIT(S): 5000 INJECTION INTRAVENOUS; SUBCUTANEOUS at 05:42

## 2019-10-05 RX ADMIN — HEPARIN SODIUM 5000 UNIT(S): 5000 INJECTION INTRAVENOUS; SUBCUTANEOUS at 21:28

## 2019-10-05 RX ADMIN — CHLORHEXIDINE GLUCONATE 1 APPLICATION(S): 213 SOLUTION TOPICAL at 05:49

## 2019-10-05 RX ADMIN — Medication 2 MILLIGRAM(S): at 14:20

## 2019-10-05 RX ADMIN — Medication 100 MILLIGRAM(S): at 21:28

## 2019-10-05 RX ADMIN — Medication 10 UNIT(S): at 08:42

## 2019-10-05 RX ADMIN — Medication 2 MILLIGRAM(S): at 21:29

## 2019-10-05 RX ADMIN — Medication 325 MILLIGRAM(S): at 12:27

## 2019-10-05 RX ADMIN — Medication 10 UNIT(S): at 17:06

## 2019-10-05 RX ADMIN — Medication 20 MILLIEQUIVALENT(S): at 22:42

## 2019-10-05 RX ADMIN — PANTOPRAZOLE SODIUM 40 MILLIGRAM(S): 20 TABLET, DELAYED RELEASE ORAL at 05:43

## 2019-10-05 RX ADMIN — HEPARIN SODIUM 5000 UNIT(S): 5000 INJECTION INTRAVENOUS; SUBCUTANEOUS at 14:19

## 2019-10-05 RX ADMIN — ERYTHROPOIETIN 4000 UNIT(S): 10000 INJECTION, SOLUTION INTRAVENOUS; SUBCUTANEOUS at 19:04

## 2019-10-05 RX ADMIN — Medication 100 MILLIGRAM(S): at 14:19

## 2019-10-05 RX ADMIN — ATORVASTATIN CALCIUM 80 MILLIGRAM(S): 80 TABLET, FILM COATED ORAL at 21:30

## 2019-10-05 RX ADMIN — INSULIN GLARGINE 28 UNIT(S): 100 INJECTION, SOLUTION SUBCUTANEOUS at 21:28

## 2019-10-05 RX ADMIN — Medication 325 MILLIGRAM(S): at 18:51

## 2019-10-05 RX ADMIN — Medication 25 MILLIGRAM(S): at 05:43

## 2019-10-05 RX ADMIN — OXYCODONE AND ACETAMINOPHEN 1 TABLET(S): 5; 325 TABLET ORAL at 14:19

## 2019-10-05 RX ADMIN — Medication 50 MICROGRAM(S): at 05:44

## 2019-10-05 RX ADMIN — CARBIDOPA AND LEVODOPA 2 TABLET(S): 25; 100 TABLET ORAL at 14:21

## 2019-10-05 RX ADMIN — CARBIDOPA AND LEVODOPA 2 TABLET(S): 25; 100 TABLET ORAL at 21:29

## 2019-10-05 RX ADMIN — Medication 100 MILLIGRAM(S): at 05:43

## 2019-10-05 RX ADMIN — OXYCODONE AND ACETAMINOPHEN 1 TABLET(S): 5; 325 TABLET ORAL at 03:30

## 2019-10-05 RX ADMIN — CARBIDOPA AND LEVODOPA 2 TABLET(S): 25; 100 TABLET ORAL at 05:43

## 2019-10-05 RX ADMIN — AMIODARONE HYDROCHLORIDE 400 MILLIGRAM(S): 400 TABLET ORAL at 22:42

## 2019-10-05 RX ADMIN — Medication 40 MILLIGRAM(S): at 20:28

## 2019-10-05 RX ADMIN — Medication 50 GRAM(S): at 22:41

## 2019-10-05 RX ADMIN — Medication 25 MILLIGRAM(S): at 14:28

## 2019-10-05 RX ADMIN — Medication 1 MILLIGRAM(S): at 18:51

## 2019-10-05 NOTE — PROGRESS NOTE ADULT - PROBLEM SELECTOR PLAN 2
Will continue Synthroid 50 mcg; Will continue to monitor and FU.  Suggest to repeat TFTs in 6 weeks.

## 2019-10-05 NOTE — PROGRESS NOTE ADULT - SUBJECTIVE AND OBJECTIVE BOX
Chief complaint  Patient is a 69y old  Male who presents with a chief complaint of Multivessel disease (05 Oct 2019 12:37)   Review of systems  Patient in bed, looks comfortable, no fever, no hypoglycemia.    Labs and Fingersticks  CAPILLARY BLOOD GLUCOSE      POCT Blood Glucose.: 124 mg/dL (05 Oct 2019 17:02)  POCT Blood Glucose.: 150 mg/dL (05 Oct 2019 12:25)  POCT Blood Glucose.: 140 mg/dL (05 Oct 2019 08:31)  POCT Blood Glucose.: 139 mg/dL (04 Oct 2019 22:07)      Anion Gap, Serum: 9 (10-05 @ 01:08)  Anion Gap, Serum: 11 (10-04 @ 01:16)      Calcium, Total Serum: 8.9 (10-05 @ 01:08)  Calcium, Total Serum: 8.2 <L> (10-04 @ 01:16)  Albumin, Serum: 2.9 <L> (10-05 @ 01:08)  Albumin, Serum: 2.8 <L> (10-04 @ 01:16)    Alanine Aminotransferase (ALT/SGPT): <5 <L> (10-05 @ 01:08)  Alanine Aminotransferase (ALT/SGPT): 5 <L> (10-04 @ 01:16)  Alkaline Phosphatase, Serum: 54 (10-05 @ 01:08)  Alkaline Phosphatase, Serum: 53 (10-04 @ 01:16)  Aspartate Aminotransferase (AST/SGOT): 16 (10-05 @ 01:08)  Aspartate Aminotransferase (AST/SGOT): 29 (10-04 @ 01:16)        10-05    x   |  x   |  x   ----------------------------<  x   4.0   |  x   |  x     Ca    8.9      05 Oct 2019 01:08  Phos  3.7     10-05  Mg     2.3     10-05    TPro  5.8<L>  /  Alb  2.9<L>  /  TBili  0.4  /  DBili  x   /  AST  16  /  ALT  <5<L>  /  AlkPhos  54  10-05                        8.7    13.68 )-----------( 209      ( 05 Oct 2019 01:08 )             25.3     Medications  MEDICATIONS  (STANDING):  acetaminophen   Tablet .. 325 milliGRAM(s) Oral once  aspirin enteric coated 325 milliGRAM(s) Oral daily  atorvastatin 80 milliGRAM(s) Oral at bedtime  carbidopa/levodopa  25/100 2 Tablet(s) Oral three times a day  chlorhexidine 2% Cloths 1 Application(s) Topical <User Schedule>  dextrose 5%. 1000 milliLiter(s) (50 mL/Hr) IV Continuous <Continuous>  dextrose 50% Injectable 12.5 Gram(s) IV Push once  dextrose 50% Injectable 25 Gram(s) IV Push once  dextrose 50% Injectable 25 Gram(s) IV Push once  dextrose 50% Injectable 50 milliLiter(s) IV Push every 15 minutes  dextrose 50% Injectable 25 milliLiter(s) IV Push every 15 minutes  docusate sodium 100 milliGRAM(s) Oral three times a day  epoetin mari Injectable 4000 Unit(s) SubCutaneous every 7 days  ferrous    sulfate 325 milliGRAM(s) Oral daily  folic acid 1 milliGRAM(s) Oral daily  heparin  Injectable 5000 Unit(s) SubCutaneous every 8 hours  insulin glargine Injectable (LANTUS) 28 Unit(s) SubCutaneous at bedtime  insulin lispro (HumaLOG) corrective regimen sliding scale   SubCutaneous three times a day before meals  insulin lispro (HumaLOG) corrective regimen sliding scale   SubCutaneous at bedtime  insulin lispro Injectable (HumaLOG) 10 Unit(s) SubCutaneous three times a day before meals  levothyroxine 50 MICROGram(s) Oral daily  metoprolol tartrate 25 milliGRAM(s) Oral every 8 hours  pantoprazole    Tablet 40 milliGRAM(s) Oral before breakfast  sodium chloride 0.9%. 1000 milliLiter(s) (10 mL/Hr) IV Continuous <Continuous>  torsemide 10 milliGRAM(s) Oral daily  trihexyphenidyl 2 milliGRAM(s) Oral three times a day      Physical Exam  General: Patient comfortable in bed  Vital Signs Last 12 Hrs  T(F): 98.7 (10-05-19 @ 19:00), Max: 99.1 (10-05-19 @ 12:00)  HR: 70 (10-05-19 @ 21:00) (69 - 88)  BP: 106/58 (10-05-19 @ 21:00) (88/53 - 128/64)  BP(mean): 76 (10-05-19 @ 21:00) (65 - 88)  RR: 23 (10-05-19 @ 21:00) (13 - 26)  SpO2: 99% (10-05-19 @ 21:00) (94% - 99%)  Neck: No palpable thyroid nodules.  CVS: S1S2, No murmurs  Respiratory: No wheezing, no crepitations  GI: Abdomen soft, bowel sounds positive  Musculoskeletal:  edema lower extremities.   Skin: No skin rashes, no ecchymosis    Diagnostics

## 2019-10-05 NOTE — PROGRESS NOTE ADULT - SUBJECTIVE AND OBJECTIVE BOX
ORLIN HARDIN   MRN#: [id number]     The patient is a 69y Male PMHx CAD s/p stent x 1 (2010), T2DM, HTN, Parkinson's presenting with c/o 10/10 chest pain described as burning radiating from abdomen to midsternum x 1 day., s/p C5L, who was seen, evaluated, & examined with the CTICU staff on rounds and later in the day with a multidisciplinary care plan formulated & implemented.  All available clinical, laboratory, radiographic, pharmacologic, and electrocardiographic data were reviewed & analyzed.      The patient was in the CTICU in critical condition secondary to persistent cardiopulmonary dysfunction, Parkinson's disease, and progressive acute on chronic renal injury.     Respiratory status required supplemental oxygen, the following of continuous pulse oximetry monitoring for support & to evaluate for & prevent further decompensation secondary to persistent cardiopulmonary dysfunction.      Continuing Javier catheter and monitoring of urine output secondary to progressive acute on chronic renal injury.     Metabolic stability, stress hyperglycemia, & infection prophylaxis required Lantus, the following of serial glucose levels to help achieve & maintain euglycemia.      Patient required more than the usual postoperative critical care management and I provided 30 minutes of non-continuous care to the patient.  Discussed at length with the CTICU staff and helped coordinate care.

## 2019-10-05 NOTE — PROGRESS NOTE ADULT - SUBJECTIVE AND OBJECTIVE BOX
pt seen and examined, no complaints, ROS - .          acetaminophen   Tablet .. 325 milliGRAM(s) Oral once  aspirin enteric coated 325 milliGRAM(s) Oral daily  atorvastatin 80 milliGRAM(s) Oral at bedtime  carbidopa/levodopa  25/100 2 Tablet(s) Oral three times a day  chlorhexidine 2% Cloths 1 Application(s) Topical <User Schedule>  dextrose 40% Gel 15 Gram(s) Oral once PRN  dextrose 5%. 1000 milliLiter(s) IV Continuous <Continuous>  dextrose 50% Injectable 12.5 Gram(s) IV Push once  dextrose 50% Injectable 25 Gram(s) IV Push once  dextrose 50% Injectable 25 Gram(s) IV Push once  dextrose 50% Injectable 50 milliLiter(s) IV Push every 15 minutes  dextrose 50% Injectable 25 milliLiter(s) IV Push every 15 minutes  docusate sodium 100 milliGRAM(s) Oral three times a day  glucagon  Injectable 1 milliGRAM(s) IntraMuscular once PRN  heparin  Injectable 5000 Unit(s) SubCutaneous every 8 hours  insulin glargine Injectable (LANTUS) 28 Unit(s) SubCutaneous at bedtime  insulin lispro (HumaLOG) corrective regimen sliding scale   SubCutaneous three times a day before meals  insulin lispro (HumaLOG) corrective regimen sliding scale   SubCutaneous at bedtime  insulin lispro Injectable (HumaLOG) 10 Unit(s) SubCutaneous three times a day before meals  levothyroxine 50 MICROGram(s) Oral daily  metoprolol tartrate 25 milliGRAM(s) Oral every 8 hours  oxyCODONE    5 mG/acetaminophen 325 mG 1 Tablet(s) Oral every 6 hours PRN  oxyCODONE    5 mG/acetaminophen 325 mG 2 Tablet(s) Oral every 6 hours PRN  pantoprazole    Tablet 40 milliGRAM(s) Oral before breakfast  sodium chloride 0.9%. 1000 milliLiter(s) IV Continuous <Continuous>  torsemide 10 milliGRAM(s) Oral daily  trihexyphenidyl 2 milliGRAM(s) Oral three times a day                            8.7    13.68 )-----------( 209      ( 05 Oct 2019 01:08 )             25.3       Hemoglobin: 8.7 g/dL (10-05 @ 01:08)  Hemoglobin: 9.5 g/dL (10-04 @ 01:16)  Hemoglobin: 10.3 g/dL (10-03 @ 01:40)  Hemoglobin: 8.5 g/dL (10-02 @ 16:22)  Hemoglobin: 11.1 g/dL (10-01 @ 06:54)      10-05    134<L>  |  99  |  48<H>  ----------------------------<  136<H>  3.8   |  26  |  3.03<H>    Ca    8.9      05 Oct 2019 01:08  Phos  3.7     10-05  Mg     2.3     10-05    TPro  5.8<L>  /  Alb  2.9<L>  /  TBili  0.4  /  DBili  x   /  AST  16  /  ALT  <5<L>  /  AlkPhos  54  10-05    Creatinine Trend: 3.03<--, 2.79<--, 2.61<--, 2.23<--, 2.70<--, 2.34<--    COAGS:     CARDIAC MARKERS ( 02 Oct 2019 16:22 )  x     / x     / 310 U/L / x     / 26.1 ng/mL        T(C): 37.1 (10-05-19 @ 04:00), Max: 37.8 (10-04-19 @ 13:00)  HR: 67 (10-05-19 @ 04:00) (59 - 95)  BP: 103/59 (10-05-19 @ 04:00) (100/63 - 146/57)  RR: 19 (10-05-19 @ 04:00) (17 - 27)  SpO2: 100% (10-05-19 @ 04:00) (99% - 100%)  Wt(kg): --    I&O's Summary    03 Oct 2019 07:01  -  04 Oct 2019 07:00  --------------------------------------------------------  IN: 493 mL / OUT: 1360 mL / NET: -867 mL    04 Oct 2019 07:01  -  05 Oct 2019 04:58  --------------------------------------------------------  IN: 720 mL / OUT: 1010 mL / NET: -290 mL           Appearance: Normal	  HEENT:   Normal oral mucosa, PERRL, EOMI	  Lymphatic: No lymphadenopathy , no edema  Cardiovascular: Normal S1 S2, No JVD, No murmurs , Peripheral pulses palpable 2+ bilaterally  Respiratory: Lungs clear to auscultation, normal effort 	  Gastrointestinal:  Soft, Non-tender, + BS	  Skin: No rashes, No ecchymoses, No cyanosis, warm to touch  Musculoskeletal: Normal range of motion, normal strength  Psychiatry:  Mood & affect appropriate    TELEMETRY: 	  nsr       DIAGNOSTIC TESTING:  [ ] Echocardiogram: < from: Intra-Operative Transesophageal Echo (10.02.19 @ 13:30) >  Conclusions:  1. Mild segmental left ventricular systolic dysfunction.  Apicl hypokinesis  2. Moderate diastolic dysfunction (Stage II).  Confirmed on  10/2/2019 - 14:16:07 by Daniel Duran M.D.  ------------------------------------------------------------------------    < end of copied text >    [ ]  Catheterization:  [ ] Stress Test:    OTHER: 	        ASSESSMENT/PLAN:   70 yo M PMHx CAD s/p stent x 1 (2010), T2DM, HTN, Parkinson's, history of meningioma, admitted with NSTEMI, TTE with mild segmental LV dysfxn, cath with multivessel CAD, s/p C5L LIMA-LAD, SVG-RPDA, SVG-RPL, SVG-Ramus, SVG-OM 10/2    ASA, statin    GI / DVT prophylaxis.   keep K>4, mag >2.0   Tolerating BB   cont home dose of parkinson meds  physical therapy follow up   D/c chest tubes per cts   D/W Dr Feliz

## 2019-10-05 NOTE — PROGRESS NOTE ADULT - ATTENDING COMMENTS
CARDIOLOGY ATTENDING    Agree with above. Supportive care as per CTICU s/p CABG. Continue current medical therapy for stable CAD with mild LV dysfunction

## 2019-10-05 NOTE — PROGRESS NOTE ADULT - SUBJECTIVE AND OBJECTIVE BOX
[facility]      Seen earlier today, and examined.  - Today, patient is without complaints.           *****MEDICATIONS: Current medication reviewed and documented.    MEDICATIONS  (STANDING):  acetaminophen   Tablet .. 325 milliGRAM(s) Oral once  amiodarone    Tablet 400 milliGRAM(s) Oral every 8 hours  amiodarone    Tablet   Oral   aspirin enteric coated 325 milliGRAM(s) Oral daily  atorvastatin 80 milliGRAM(s) Oral at bedtime  carbidopa/levodopa  25/100 2 Tablet(s) Oral three times a day  chlorhexidine 2% Cloths 1 Application(s) Topical <User Schedule>  dextrose 5%. 1000 milliLiter(s) (50 mL/Hr) IV Continuous <Continuous>  dextrose 50% Injectable 12.5 Gram(s) IV Push once  dextrose 50% Injectable 25 Gram(s) IV Push once  dextrose 50% Injectable 25 Gram(s) IV Push once  dextrose 50% Injectable 50 milliLiter(s) IV Push every 15 minutes  dextrose 50% Injectable 25 milliLiter(s) IV Push every 15 minutes  docusate sodium 100 milliGRAM(s) Oral three times a day  epoetin mari Injectable 4000 Unit(s) SubCutaneous every 7 days  ferrous    sulfate 325 milliGRAM(s) Oral daily  folic acid 1 milliGRAM(s) Oral daily  heparin  Injectable 5000 Unit(s) SubCutaneous every 8 hours  insulin glargine Injectable (LANTUS) 28 Unit(s) SubCutaneous at bedtime  insulin lispro (HumaLOG) corrective regimen sliding scale   SubCutaneous three times a day before meals  insulin lispro (HumaLOG) corrective regimen sliding scale   SubCutaneous at bedtime  insulin lispro Injectable (HumaLOG) 10 Unit(s) SubCutaneous three times a day before meals  levothyroxine 50 MICROGram(s) Oral daily  metoprolol tartrate 25 milliGRAM(s) Oral every 8 hours  pantoprazole    Tablet 40 milliGRAM(s) Oral before breakfast  torsemide 10 milliGRAM(s) Oral daily  trihexyphenidyl 2 milliGRAM(s) Oral three times a day    MEDICATIONS  (PRN):  dextrose 40% Gel 15 Gram(s) Oral once PRN Blood Glucose LESS THAN 70 milliGRAM(s)/deciliter  glucagon  Injectable 1 milliGRAM(s) IntraMuscular once PRN Glucose LESS THAN 70 milligrams/deciliter  oxyCODONE    5 mG/acetaminophen 325 mG 1 Tablet(s) Oral every 6 hours PRN Moderate Pain (4 - 6)  oxyCODONE    5 mG/acetaminophen 325 mG 2 Tablet(s) Oral every 6 hours PRN Severe Pain (7 - 10)          ***** VITAL SIGNS:  T(F): 99.3 (10-06-19 @ 04:00), Max: 99.5 (10-06-19 @ 00:00)  HR: 64 (10-06-19 @ 07:00) (64 - 102)  BP: 103/60 (10-06-19 @ 07:00) (88/53 - 128/64)  RR: 23 (10-06-19 @ 07:00) (13 - 27)  SpO2: 98% (10-06-19 @ 07:00) (94% - 100%)  Wt(kg): --  ,   I&O's Summary    05 Oct 2019 07:01  -  06 Oct 2019 07:00  --------------------------------------------------------  IN: 590 mL / OUT: 2465 mL / NET: -1875 mL             *****PHYSICAL EXAM: Alert oriented x 2   Attention comprehension are fair. Able to name, repeat,  without any difficulty.   Able to follow 1-2 step commands.     EOMI fundi not visualized,  VFF to confrontration  No facial asymmetry   Tongue is midline   Palate elevates symmetrically   Moving all 4 ext symmetrically no pronator drift  left resting tremor noted   + cogwheeling     left lower ext  moving better.   Gait : not assessed.        *****LAB AND IMAGIN.6    11.71 )-----------( 241      ( 06 Oct 2019 01:08 )             28.2               10-06    130<L>  |  96  |  48<H>  ----------------------------<  95  3.6   |  23  |  3.07<H>    Ca    8.7      06 Oct 2019 01:08  Phos  3.1     10-06  Mg     3.2     10-    TPro  6.2  /  Alb  2.8<L>  /  TBili  0.5  /  DBili  x   /  AST  18  /  ALT  <5<L>  /  AlkPhos  58  10-06                       Urinalysis Basic - ( 04 Oct 2019 21:58 )    Color: Light Yellow / Appearance: Clear / S.018 / pH: x  Gluc: x / Ketone: Negative  / Bili: Negative / Urobili: Negative   Blood: x / Protein: 100 / Nitrite: Negative   Leuk Esterase: Negative / RBC: 5 /hpf / WBC 2 /HPF   Sq Epi: x / Non Sq Epi: 1 /hpf / Bacteria: Negative      [All pertinent recent Imaging/Reports reviewed]           *****A S S E S S M E N T   A N D   P L A N :    68 yo M PMHx CAD s/p stent x 1 (), T2DM, HTN, Parkinson's presenting with c/o 10/10 chest pain described as burning radiating from abdomen to midsternum x 1 day. Patient reports symptoms started at 2 pm and resolved after 1 hour. Chest pain began after he ate lunch and was walking up a flight of stairs. He reports chest pain was associated with dyspnea, diaphoresis, and nausea. At baseline, he ambulates with cane/walker. Takes medications daily including aspirin and prasugrel. On ROS, reports chronic left leg swelling x 1-2 years.        Problem/Recommendations 1: Dizziness of unclear etiology   s/p cabg,  day 1   appreciate ct care.    continue asa/atorvastatin for secondary prophy  Problem/Recommendations 2: Parkinson's stable exam  continue current regime      Thank you for allowing me to participate in the care of this patient. Please do not hesitate to call me if you have any  questions.        ________________  Lily Fang MD  [facility] ([facility]  [phone]      33 minutes spent on total encounter; more than 50 % of the visit was  spent counseling about plan of care, compliance to diet/exercise and medication regimen and or  coordinating care by the attending physician.      It is advised that stroke patients follow up with ZACH Albarran @ [phone] in 1- 2 weeks.   Others please follow up with Dr. Michael Nissenbaum [phone]

## 2019-10-05 NOTE — PROGRESS NOTE ADULT - ASSESSMENT
Assessment  DMT2: 69y Male with DM T2 with hyperglycemia, A1C 7.2%, was on oral meds and insulin at home, now postop CABG on insulin, FS improving,, no hypoglycemic episode, eating partial meals, comfortable.  Hypothyroidism: Patient has no hx thyroid dz, was not on any thyroid supplements, found  incidentally with TSH 6.22 and was started on synthroid 50mcg PO daily- discontinued via patient transfer.  CAD: postop CABG 10/2, recovering in CTU, on medications, no chest pain, stable, monitored.  HTN: Controlled,  on antihypertensive medications.  Parkinson's: on meds, stable.          Kelsie Reece MD  Cell: [phone]  Office: [phone]

## 2019-10-05 NOTE — PROGRESS NOTE ADULT - SUBJECTIVE AND OBJECTIVE BOX
Patient seen and examined  + generalized weakness  + SOB and cough    REVIEW OF SYSTEMS:  As per HPI, otherwise 8 full 10 ROS were unremarkable    MEDICATIONS  (STANDING):  acetaminophen   Tablet .. 325 milliGRAM(s) Oral once  aspirin enteric coated 325 milliGRAM(s) Oral daily  atorvastatin 80 milliGRAM(s) Oral at bedtime  carbidopa/levodopa  25/100 2 Tablet(s) Oral three times a day  chlorhexidine 2% Cloths 1 Application(s) Topical <User Schedule>  dextrose 5%. 1000 milliLiter(s) (50 mL/Hr) IV Continuous <Continuous>  dextrose 50% Injectable 12.5 Gram(s) IV Push once  dextrose 50% Injectable 25 Gram(s) IV Push once  dextrose 50% Injectable 25 Gram(s) IV Push once  dextrose 50% Injectable 50 milliLiter(s) IV Push every 15 minutes  dextrose 50% Injectable 25 milliLiter(s) IV Push every 15 minutes  docusate sodium 100 milliGRAM(s) Oral three times a day  heparin  Injectable 5000 Unit(s) SubCutaneous every 8 hours  insulin glargine Injectable (LANTUS) 28 Unit(s) SubCutaneous at bedtime  insulin lispro (HumaLOG) corrective regimen sliding scale   SubCutaneous three times a day before meals  insulin lispro (HumaLOG) corrective regimen sliding scale   SubCutaneous at bedtime  insulin lispro Injectable (HumaLOG) 10 Unit(s) SubCutaneous three times a day before meals  levothyroxine 50 MICROGram(s) Oral daily  metoprolol tartrate 25 milliGRAM(s) Oral every 8 hours  pantoprazole    Tablet 40 milliGRAM(s) Oral before breakfast  sodium chloride 0.9%. 1000 milliLiter(s) (10 mL/Hr) IV Continuous <Continuous>  torsemide 10 milliGRAM(s) Oral daily  trihexyphenidyl 2 milliGRAM(s) Oral three times a day      VITAL:  T(C): , Max: 37.8 (10-04-19 @ 13:00)  T(F): , Max: 100 (10-04-19 @ 13:00)  HR: 88 (10-05-19 @ 12:00)  BP: 104/60 (10-05-19 @ 12:00)  BP(mean): 75 (10-05-19 @ 12:00)  RR: 20 (10-05-19 @ 12:00)  SpO2: 98% (10-05-19 @ 12:00)  Wt(kg): --    I and O's:    10-04 @ 07:  -  10-05 @ 07:00  --------------------------------------------------------  IN: 970 mL / OUT: 1180 mL / NET: -210 mL    10-05 @ 07:  -  10-05 @ 12:37  --------------------------------------------------------  IN: 240 mL / OUT: 340 mL / NET: -100 mL          PHYSICAL EXAM:    Constitutional: NAD  Neck:  No JVD  Respiratory: CTAB/L  Cardiovascular: S1 and S2  Gastrointestinal: BS+, soft, NT/ND  Extremities: +  peripheral edema  Neurological: A/O x 3, no focal deficits  Psychiatric: Normal mood, normal affect  : + Javier  Skin: No rashes      LABS:                        8.7    13.68 )-----------( 209      ( 05 Oct 2019 01:08 )             25.3     10-    134<L>  |  99  |  48<H>  ----------------------------<  136<H>  3.8   |  26  |  3.03<H>    Ca    8.9      05 Oct 2019 01:08  Phos  3.7     10-05  Mg     2.3     10-05    TPro  5.8<L>  /  Alb  2.9<L>  /  TBili  0.4  /  DBili  x   /  AST  16  /  ALT  <5<L>  /  AlkPhos  54  10-05      Urine Studies:  Urinalysis Basic - ( 04 Oct 2019 21:58 )    Color: Light Yellow / Appearance: Clear / S.018 / pH: x  Gluc: x / Ketone: Negative  / Bili: Negative / Urobili: Negative   Blood: x / Protein: 100 / Nitrite: Negative   Leuk Esterase: Negative / RBC: 5 /hpf / WBC 2 /HPF   Sq Epi: x / Non Sq Epi: 1 /hpf / Bacteria: Negative            Assessment and Plan:   · Assessment		  70 yo M PMHx CAD s/p stent x 1 (), T2DM, HTN, Parkinson's and CKD stage 3b presents with chest pain. S/P cath with MVD- POD #3  CABG  Subnephrotic range proteinuria     1. Renal - IUQ0y-6.  Trend BMP and urine output- ABBY is likely pre-renally mediated     2. CVS   Not on pressors at present. BP is soft      3. Pulm - Appears hypervolemic       Recommendations  - Increase torsemide to 20 mg po daily  - Epogen 10K units SQ x 1 dose     d/w CTU team

## 2019-10-06 LAB
ALBUMIN SERPL ELPH-MCNC: 2.8 G/DL — LOW (ref 3.3–5)
ALP SERPL-CCNC: 58 U/L — SIGNIFICANT CHANGE UP (ref 40–120)
ALT FLD-CCNC: <5 U/L — LOW (ref 10–45)
ANION GAP SERPL CALC-SCNC: 11 MMOL/L — SIGNIFICANT CHANGE UP (ref 5–17)
ANION GAP SERPL CALC-SCNC: 13 MMOL/L — SIGNIFICANT CHANGE UP (ref 5–17)
AST SERPL-CCNC: 18 U/L — SIGNIFICANT CHANGE UP (ref 10–40)
BILIRUB SERPL-MCNC: 0.5 MG/DL — SIGNIFICANT CHANGE UP (ref 0.2–1.2)
BUN SERPL-MCNC: 48 MG/DL — HIGH (ref 7–23)
BUN SERPL-MCNC: 53 MG/DL — HIGH (ref 7–23)
CALCIUM SERPL-MCNC: 8.6 MG/DL — SIGNIFICANT CHANGE UP (ref 8.4–10.5)
CALCIUM SERPL-MCNC: 8.7 MG/DL — SIGNIFICANT CHANGE UP (ref 8.4–10.5)
CHLORIDE SERPL-SCNC: 96 MMOL/L — SIGNIFICANT CHANGE UP (ref 96–108)
CHLORIDE SERPL-SCNC: 97 MMOL/L — SIGNIFICANT CHANGE UP (ref 96–108)
CO2 SERPL-SCNC: 23 MMOL/L — SIGNIFICANT CHANGE UP (ref 22–31)
CO2 SERPL-SCNC: 24 MMOL/L — SIGNIFICANT CHANGE UP (ref 22–31)
CREAT SERPL-MCNC: 3.07 MG/DL — HIGH (ref 0.5–1.3)
CREAT SERPL-MCNC: 3.07 MG/DL — HIGH (ref 0.5–1.3)
GLUCOSE SERPL-MCNC: 145 MG/DL — HIGH (ref 70–99)
GLUCOSE SERPL-MCNC: 95 MG/DL — SIGNIFICANT CHANGE UP (ref 70–99)
HCT VFR BLD CALC: 28.2 % — LOW (ref 39–50)
HGB BLD-MCNC: 9.6 G/DL — LOW (ref 13–17)
MAGNESIUM SERPL-MCNC: 3.2 MG/DL — HIGH (ref 1.6–2.6)
MCHC RBC-ENTMCNC: 30.7 PG — SIGNIFICANT CHANGE UP (ref 27–34)
MCHC RBC-ENTMCNC: 34 GM/DL — SIGNIFICANT CHANGE UP (ref 32–36)
MCV RBC AUTO: 90.1 FL — SIGNIFICANT CHANGE UP (ref 80–100)
NRBC # BLD: 0 /100 WBCS — SIGNIFICANT CHANGE UP (ref 0–0)
PHOSPHATE SERPL-MCNC: 3.1 MG/DL — SIGNIFICANT CHANGE UP (ref 2.5–4.5)
PLATELET # BLD AUTO: 241 K/UL — SIGNIFICANT CHANGE UP (ref 150–400)
POTASSIUM SERPL-MCNC: 3.6 MMOL/L — SIGNIFICANT CHANGE UP (ref 3.5–5.3)
POTASSIUM SERPL-MCNC: 3.9 MMOL/L — SIGNIFICANT CHANGE UP (ref 3.5–5.3)
POTASSIUM SERPL-SCNC: 3.6 MMOL/L — SIGNIFICANT CHANGE UP (ref 3.5–5.3)
POTASSIUM SERPL-SCNC: 3.9 MMOL/L — SIGNIFICANT CHANGE UP (ref 3.5–5.3)
PROT SERPL-MCNC: 6.2 G/DL — SIGNIFICANT CHANGE UP (ref 6–8.3)
RBC # BLD: 3.13 M/UL — LOW (ref 4.2–5.8)
RBC # FLD: 15.2 % — HIGH (ref 10.3–14.5)
SODIUM SERPL-SCNC: 130 MMOL/L — LOW (ref 135–145)
SODIUM SERPL-SCNC: 134 MMOL/L — LOW (ref 135–145)
WBC # BLD: 11.71 K/UL — HIGH (ref 3.8–10.5)
WBC # FLD AUTO: 11.71 K/UL — HIGH (ref 3.8–10.5)

## 2019-10-06 PROCEDURE — 99291 CRITICAL CARE FIRST HOUR: CPT

## 2019-10-06 PROCEDURE — 71045 X-RAY EXAM CHEST 1 VIEW: CPT | Mod: 26

## 2019-10-06 PROCEDURE — 93010 ELECTROCARDIOGRAM REPORT: CPT

## 2019-10-06 RX ORDER — FUROSEMIDE 40 MG
40 TABLET ORAL ONCE
Refills: 0 | Status: COMPLETED | OUTPATIENT
Start: 2019-10-06 | End: 2019-10-06

## 2019-10-06 RX ORDER — POTASSIUM CHLORIDE 20 MEQ
20 PACKET (EA) ORAL ONCE
Refills: 0 | Status: COMPLETED | OUTPATIENT
Start: 2019-10-06 | End: 2019-10-06

## 2019-10-06 RX ADMIN — HEPARIN SODIUM 5000 UNIT(S): 5000 INJECTION INTRAVENOUS; SUBCUTANEOUS at 14:05

## 2019-10-06 RX ADMIN — PANTOPRAZOLE SODIUM 40 MILLIGRAM(S): 20 TABLET, DELAYED RELEASE ORAL at 09:16

## 2019-10-06 RX ADMIN — Medication 2 MILLIGRAM(S): at 05:06

## 2019-10-06 RX ADMIN — Medication 100 MILLIGRAM(S): at 14:04

## 2019-10-06 RX ADMIN — Medication 10 MILLIGRAM(S): at 05:06

## 2019-10-06 RX ADMIN — Medication 2 MILLIGRAM(S): at 21:17

## 2019-10-06 RX ADMIN — AMIODARONE HYDROCHLORIDE 400 MILLIGRAM(S): 400 TABLET ORAL at 21:17

## 2019-10-06 RX ADMIN — HEPARIN SODIUM 5000 UNIT(S): 5000 INJECTION INTRAVENOUS; SUBCUTANEOUS at 21:17

## 2019-10-06 RX ADMIN — INSULIN GLARGINE 28 UNIT(S): 100 INJECTION, SOLUTION SUBCUTANEOUS at 21:48

## 2019-10-06 RX ADMIN — CARBIDOPA AND LEVODOPA 2 TABLET(S): 25; 100 TABLET ORAL at 05:06

## 2019-10-06 RX ADMIN — Medication 10 UNIT(S): at 09:15

## 2019-10-06 RX ADMIN — Medication 25 MILLIGRAM(S): at 05:05

## 2019-10-06 RX ADMIN — Medication 100 MILLIGRAM(S): at 05:06

## 2019-10-06 RX ADMIN — Medication 2 MILLIGRAM(S): at 14:04

## 2019-10-06 RX ADMIN — Medication 1 MILLIGRAM(S): at 12:30

## 2019-10-06 RX ADMIN — Medication 20 MILLIEQUIVALENT(S): at 02:05

## 2019-10-06 RX ADMIN — CHLORHEXIDINE GLUCONATE 1 APPLICATION(S): 213 SOLUTION TOPICAL at 05:06

## 2019-10-06 RX ADMIN — AMIODARONE HYDROCHLORIDE 400 MILLIGRAM(S): 400 TABLET ORAL at 14:04

## 2019-10-06 RX ADMIN — AMIODARONE HYDROCHLORIDE 400 MILLIGRAM(S): 400 TABLET ORAL at 05:06

## 2019-10-06 RX ADMIN — Medication 25 MILLIGRAM(S): at 21:17

## 2019-10-06 RX ADMIN — Medication 325 MILLIGRAM(S): at 12:30

## 2019-10-06 RX ADMIN — Medication 40 MILLIGRAM(S): at 15:19

## 2019-10-06 RX ADMIN — ATORVASTATIN CALCIUM 80 MILLIGRAM(S): 80 TABLET, FILM COATED ORAL at 21:16

## 2019-10-06 RX ADMIN — Medication 100 MILLIGRAM(S): at 21:16

## 2019-10-06 RX ADMIN — Medication 10 UNIT(S): at 12:31

## 2019-10-06 RX ADMIN — Medication 50 MICROGRAM(S): at 05:06

## 2019-10-06 RX ADMIN — Medication 10 UNIT(S): at 18:09

## 2019-10-06 RX ADMIN — HEPARIN SODIUM 5000 UNIT(S): 5000 INJECTION INTRAVENOUS; SUBCUTANEOUS at 05:06

## 2019-10-06 RX ADMIN — Medication 25 MILLIGRAM(S): at 14:04

## 2019-10-06 RX ADMIN — CARBIDOPA AND LEVODOPA 2 TABLET(S): 25; 100 TABLET ORAL at 14:04

## 2019-10-06 RX ADMIN — CARBIDOPA AND LEVODOPA 2 TABLET(S): 25; 100 TABLET ORAL at 21:17

## 2019-10-06 NOTE — PROGRESS NOTE ADULT - SUBJECTIVE AND OBJECTIVE BOX
pt seen and examined, no complaints, ROS - .   pt with Afib this Am, converted to NSR , on PO amio load / BB           acetaminophen   Tablet .. 325 milliGRAM(s) Oral once  amiodarone    Tablet 400 milliGRAM(s) Oral every 8 hours  amiodarone    Tablet   Oral   aspirin enteric coated 325 milliGRAM(s) Oral daily  atorvastatin 80 milliGRAM(s) Oral at bedtime  carbidopa/levodopa  25/100 2 Tablet(s) Oral three times a day  chlorhexidine 2% Cloths 1 Application(s) Topical <User Schedule>  dextrose 40% Gel 15 Gram(s) Oral once PRN  dextrose 5%. 1000 milliLiter(s) IV Continuous <Continuous>  dextrose 50% Injectable 12.5 Gram(s) IV Push once  dextrose 50% Injectable 25 Gram(s) IV Push once  dextrose 50% Injectable 25 Gram(s) IV Push once  dextrose 50% Injectable 50 milliLiter(s) IV Push every 15 minutes  dextrose 50% Injectable 25 milliLiter(s) IV Push every 15 minutes  docusate sodium 100 milliGRAM(s) Oral three times a day  epoetin mari Injectable 4000 Unit(s) SubCutaneous every 7 days  ferrous    sulfate 325 milliGRAM(s) Oral daily  folic acid 1 milliGRAM(s) Oral daily  glucagon  Injectable 1 milliGRAM(s) IntraMuscular once PRN  heparin  Injectable 5000 Unit(s) SubCutaneous every 8 hours  insulin glargine Injectable (LANTUS) 28 Unit(s) SubCutaneous at bedtime  insulin lispro (HumaLOG) corrective regimen sliding scale   SubCutaneous three times a day before meals  insulin lispro (HumaLOG) corrective regimen sliding scale   SubCutaneous at bedtime  insulin lispro Injectable (HumaLOG) 10 Unit(s) SubCutaneous three times a day before meals  levothyroxine 50 MICROGram(s) Oral daily  metoprolol tartrate 25 milliGRAM(s) Oral every 8 hours  oxyCODONE    5 mG/acetaminophen 325 mG 1 Tablet(s) Oral every 6 hours PRN  oxyCODONE    5 mG/acetaminophen 325 mG 2 Tablet(s) Oral every 6 hours PRN  pantoprazole    Tablet 40 milliGRAM(s) Oral before breakfast  sodium chloride 0.9%. 1000 milliLiter(s) IV Continuous <Continuous>  torsemide 10 milliGRAM(s) Oral daily  trihexyphenidyl 2 milliGRAM(s) Oral three times a day                            9.6    11.71 )-----------( 241      ( 06 Oct 2019 01:08 )             28.2       Hemoglobin: 9.6 g/dL (10-06 @ 01:08)  Hemoglobin: 8.7 g/dL (10-05 @ 01:08)  Hemoglobin: 9.5 g/dL (10-04 @ 01:16)  Hemoglobin: 10.3 g/dL (10-03 @ 01:40)  Hemoglobin: 8.5 g/dL (10-02 @ 16:22)      10-06    130<L>  |  96  |  48<H>  ----------------------------<  95  3.6   |  23  |  3.07<H>    Ca    8.7      06 Oct 2019 01:08  Phos  3.1     10-06  Mg     3.2     10-06    TPro  6.2  /  Alb  2.8<L>  /  TBili  0.5  /  DBili  x   /  AST  18  /  ALT  <5<L>  /  AlkPhos  58  10-06    Creatinine Trend: 3.07<--, 3.03<--, 2.79<--, 2.61<--, 2.23<--, 2.70<--    COAGS:           T(C): 37.5 (10-06-19 @ 00:00), Max: 37.5 (10-06-19 @ 00:00)  HR: 74 (10-06-19 @ 02:00) (61 - 102)  BP: 109/68 (10-06-19 @ 02:00) (88/53 - 128/64)  RR: 20 (10-06-19 @ 02:00) (13 - 27)  SpO2: 100% (10-06-19 @ 02:00) (94% - 100%)  Wt(kg): --    I&O's Summary    04 Oct 2019 07:01  -  05 Oct 2019 07:00  --------------------------------------------------------  IN: 970 mL / OUT: 1180 mL / NET: -210 mL    05 Oct 2019 07:01  -  06 Oct 2019 02:25  --------------------------------------------------------  IN: 540 mL / OUT: 2065 mL / NET: -1525 mL      Appearance: Normal	  HEENT:   Normal oral mucosa, PERRL, EOMI	  Lymphatic: No lymphadenopathy , no edema  Cardiovascular: Normal S1 S2, No JVD, No murmurs , Peripheral pulses palpable 2+ bilaterally  Respiratory: Lungs clear to auscultation, normal effort 	  Gastrointestinal:  Soft, Non-tender, + BS	  Skin: No rashes, No ecchymoses, No cyanosis, warm to touch  Musculoskeletal: Normal range of motion, normal strength  Psychiatry:  Mood & affect appropriate    TELEMETRY: 	  nsr       DIAGNOSTIC TESTING:  [ ] Echocardiogram: < from: Intra-Operative Transesophageal Echo (10.02.19 @ 13:30) >  Conclusions:  1. Mild segmental left ventricular systolic dysfunction.  Apicl hypokinesis  2. Moderate diastolic dysfunction (Stage II).  Confirmed on  10/2/2019 - 14:16:07 by Daniel Duran M.D.  ------------------------------------------------------------------------    < end of copied text >    [ ]  Catheterization:  [ ] Stress Test:    OTHER: 	        ASSESSMENT/PLAN:   70 yo M PMHx CAD s/p stent x 1 (2010), T2DM, HTN, Parkinson's, history of meningioma, admitted with NSTEMI, TTE with mild segmental LV dysfxn, cath with multivessel CAD, s/p C5L LIMA-LAD, SVG-RPDA, SVG-RPL, SVG-Ramus, SVG-OM 10/2    ASA, statin   Afib post op , cont Amio load and BB  A/C per CTS    GI / DVT prophylaxis.   keep K>4, mag >2.0   Tolerating BB   cont home dose of parkinson meds  physical therapy follow up

## 2019-10-06 NOTE — PROGRESS NOTE ADULT - SUBJECTIVE AND OBJECTIVE BOX
ORLIN HARDIN   MRN#: [id number]     The patient is a 69y Male PMHx CAD s/p stent x 1 (2010), T2DM, HTN, Parkinson's presenting with c/o 10/10 chest pain described as burning radiating from abdomen to midsternum x 1 day., s/p C5L, who was seen, evaluated, & examined with the CTICU staff on rounds and later in the day with a multidisciplinary care plan formulated & implemented.  All available clinical, laboratory, radiographic, pharmacologic, and electrocardiographic data were reviewed & analyzed.      The patient was in the CTICU in critical condition secondary to persistent cardiopulmonary dysfunction, Parkinson's disease, and progressive acute on chronic renal injury.     Respiratory status required supplemental oxygen, the following of continuous pulse oximetry monitoring for support & to evaluate for & prevent further decompensation secondary to persistent cardiopulmonary dysfunction.      Continuing Javier catheter and monitoring of urine output secondary to acute on chronic renal injury.     Metabolic stability, stress hyperglycemia, & infection prophylaxis required Lantus, the following of serial glucose levels to help achieve & maintain euglycemia.      Patient required more than the usual postoperative critical care management and I provided 30 minutes of non-continuous care to the patient.  Discussed at length with the CTICU staff and helped coordinate care.

## 2019-10-06 NOTE — PROGRESS NOTE ADULT - SUBJECTIVE AND OBJECTIVE BOX
Patient seen and examined  Feels better    REVIEW OF SYSTEMS:  As per HPI, otherwise 8 full 10 ROS were unremarkable    MEDICATIONS  (STANDING):  acetaminophen   Tablet .. 325 milliGRAM(s) Oral once  amiodarone    Tablet 400 milliGRAM(s) Oral every 8 hours  amiodarone    Tablet   Oral   aspirin enteric coated 325 milliGRAM(s) Oral daily  atorvastatin 80 milliGRAM(s) Oral at bedtime  carbidopa/levodopa  25/100 2 Tablet(s) Oral three times a day  chlorhexidine 2% Cloths 1 Application(s) Topical <User Schedule>  dextrose 5%. 1000 milliLiter(s) (50 mL/Hr) IV Continuous <Continuous>  dextrose 50% Injectable 12.5 Gram(s) IV Push once  dextrose 50% Injectable 25 Gram(s) IV Push once  dextrose 50% Injectable 25 Gram(s) IV Push once  dextrose 50% Injectable 50 milliLiter(s) IV Push every 15 minutes  dextrose 50% Injectable 25 milliLiter(s) IV Push every 15 minutes  docusate sodium 100 milliGRAM(s) Oral three times a day  epoetin mari Injectable 4000 Unit(s) SubCutaneous every 7 days  ferrous    sulfate 325 milliGRAM(s) Oral daily  folic acid 1 milliGRAM(s) Oral daily  heparin  Injectable 5000 Unit(s) SubCutaneous every 8 hours  insulin glargine Injectable (LANTUS) 28 Unit(s) SubCutaneous at bedtime  insulin lispro (HumaLOG) corrective regimen sliding scale   SubCutaneous three times a day before meals  insulin lispro (HumaLOG) corrective regimen sliding scale   SubCutaneous at bedtime  insulin lispro Injectable (HumaLOG) 10 Unit(s) SubCutaneous three times a day before meals  levothyroxine 50 MICROGram(s) Oral daily  metoprolol tartrate 25 milliGRAM(s) Oral every 8 hours  pantoprazole    Tablet 40 milliGRAM(s) Oral before breakfast  torsemide 10 milliGRAM(s) Oral daily  trihexyphenidyl 2 milliGRAM(s) Oral three times a day      VITAL:  T(C): , Max: 37.5 (10-06-19 @ 00:00)  T(F): , Max: 99.5 (10-06-19 @ 00:00)  HR: 73 (10-06-19 @ 10:32)  BP: 105/62 (10-06-19 @ 10:32)  BP(mean): 79 (10-06-19 @ 10:00)  RR: 24 (10-06-19 @ 10:00)  SpO2: 97% (10-06-19 @ 10:32)  Wt(kg): --    I and O's:    10-05 @ 07:01  -  10-06 @ 07:00  --------------------------------------------------------  IN: 590 mL / OUT: 2465 mL / NET: -1875 mL    10-06 @ 07:01  -  10-06 @ 10:53  --------------------------------------------------------  IN: 0 mL / OUT: 275 mL / NET: -275 mL          PHYSICAL EXAM:    Constitutional: NAD  Neck:  No JVD  Respiratory: CTAB/L  Cardiovascular: S1 and S2  Gastrointestinal: BS+, soft, NT/ND  Extremities: +  peripheral edema  Neurological: A/O x 3, no focal deficits  Psychiatric: Normal mood, normal affect  : + Javier  Skin: No rashes      LABS:                        9.6    11.71 )-----------( 241      ( 06 Oct 2019 01:08 )             28.2     10-    130<L>  |  96  |  48<H>  ----------------------------<  95  3.6   |  23  |  3.07<H>    Ca    8.7      06 Oct 2019 01:08  Phos  3.1     10-  Mg     3.2     10-    TPro  6.2  /  Alb  2.8<L>  /  TBili  0.5  /  DBili  x   /  AST  18  /  ALT  <5<L>  /  AlkPhos  58  10-06      Urine Studies:  Urinalysis Basic - ( 04 Oct 2019 21:58 )    Color: Light Yellow / Appearance: Clear / S.018 / pH: x  Gluc: x / Ketone: Negative  / Bili: Negative / Urobili: Negative   Blood: x / Protein: 100 / Nitrite: Negative   Leuk Esterase: Negative / RBC: 5 /hpf / WBC 2 /HPF   Sq Epi: x / Non Sq Epi: 1 /hpf / Bacteria: Negative            Assessment and Plan:   · Assessment		  68 yo M PMHx CAD s/p stent x 1 (), T2DM, HTN, Parkinson's and CKD stage 3b presents with chest pain. S/P cath with MVD- POD #3  CABG  Subnephrotic range proteinuria     1. Renal - [id number].  Trend BMP and urine output- ABBY is likely pre-renally mediated- stable      2. CVS   Not on pressors at present. BP is soft      3. Pulm - Appears hypervolemic s/p lasix 40 mg iV x 1     4. Hyponatremia- hypervolemic + drank a liter of water yesterday - BMP is from 1 AM    5. Anemia s/p Epogen 10K x 1 10/5    Recommendations  - Recheck BMP this AM  - Fluid restriction 1.2 L per day   - Increase Torsemide to 20 mg po daily and give lasix 40 mg IV x 1 additional dose     d/w CTU team

## 2019-10-06 NOTE — PROGRESS NOTE ADULT - ASSESSMENT
Assessment  DMT2: 69y Male with DM T2 with hyperglycemia, A1C 7.2%, was on oral meds and insulin at home, now postop CABG on insulin, he is eating partial meals, ambulating with assistance, no hypoglycemic episode, eating partial meals, comfortable.  Hypothyroidism: Patient has no hx thyroid dz, was not on any thyroid supplements, found  incidentally with TSH 6.22 and was started on synthroid 50mcg PO daily- discontinued via patient transfer.  CAD: postop CABG 10/2, recovering in CTU, on medications, no chest pain, stable, monitored.  HTN: Controlled,  on antihypertensive medications.  Parkinson's: on meds, stable.          Kelsie Reece MD  Cell: [phone]  Office: [phone]

## 2019-10-06 NOTE — PROGRESS NOTE ADULT - SUBJECTIVE AND OBJECTIVE BOX
Chief complaint  Patient is a 69y old  Male who presents with a chief complaint of Multivessel disease (06 Oct 2019 10:53)   Review of systems  Patient in bed, looks comfortable, no fever, no hypoglycemia.    Labs and Fingersticks  CAPILLARY BLOOD GLUCOSE  120 (06 Oct 2019 18:00)  128 (06 Oct 2019 12:00)  120 (06 Oct 2019 08:00)  103 (05 Oct 2019 21:00)      POCT Blood Glucose.: 126 mg/dL (06 Oct 2019 17:43)  POCT Blood Glucose.: 128 mg/dL (06 Oct 2019 12:07)  POCT Blood Glucose.: 120 mg/dL (06 Oct 2019 08:45)  POCT Blood Glucose.: 103 mg/dL (05 Oct 2019 21:23)      Anion Gap, Serum: 13 (10-06 @ 11:26)  Anion Gap, Serum: 11 (10-06 @ 01:08)  Anion Gap, Serum: 9 (10-05 @ 01:08)      Calcium, Total Serum: 8.6 (10-06 @ 11:26)  Calcium, Total Serum: 8.7 (10-06 @ 01:08)  Calcium, Total Serum: 8.9 (10-05 @ 01:08)  Albumin, Serum: 2.8 <L> (10-06 @ 01:08)  Albumin, Serum: 2.9 <L> (10-05 @ 01:08)    Alanine Aminotransferase (ALT/SGPT): <5 <L> (10-06 @ 01:08)  Alanine Aminotransferase (ALT/SGPT): <5 <L> (10-05 @ 01:08)  Alkaline Phosphatase, Serum: 58 (10-06 @ 01:08)  Alkaline Phosphatase, Serum: 54 (10-05 @ 01:08)  Aspartate Aminotransferase (AST/SGOT): 18 (10-06 @ 01:08)  Aspartate Aminotransferase (AST/SGOT): 16 (10-05 @ 01:08)        10-06    134<L>  |  97  |  53<H>  ----------------------------<  145<H>  3.9   |  24  |  3.07<H>    Ca    8.6      06 Oct 2019 11:26  Phos  3.1     10-06  Mg     3.2     10-06    TPro  6.2  /  Alb  2.8<L>  /  TBili  0.5  /  DBili  x   /  AST  18  /  ALT  <5<L>  /  AlkPhos  58  10-06                        9.6    11.71 )-----------( 241      ( 06 Oct 2019 01:08 )             28.2     Medications  MEDICATIONS  (STANDING):  acetaminophen   Tablet .. 325 milliGRAM(s) Oral once  amiodarone    Tablet 400 milliGRAM(s) Oral every 8 hours  amiodarone    Tablet   Oral   aspirin enteric coated 325 milliGRAM(s) Oral daily  atorvastatin 80 milliGRAM(s) Oral at bedtime  carbidopa/levodopa  25/100 2 Tablet(s) Oral three times a day  chlorhexidine 2% Cloths 1 Application(s) Topical <User Schedule>  dextrose 5%. 1000 milliLiter(s) (50 mL/Hr) IV Continuous <Continuous>  dextrose 50% Injectable 12.5 Gram(s) IV Push once  dextrose 50% Injectable 25 Gram(s) IV Push once  dextrose 50% Injectable 25 Gram(s) IV Push once  dextrose 50% Injectable 50 milliLiter(s) IV Push every 15 minutes  dextrose 50% Injectable 25 milliLiter(s) IV Push every 15 minutes  docusate sodium 100 milliGRAM(s) Oral three times a day  epoetin mari Injectable 4000 Unit(s) SubCutaneous every 7 days  ferrous    sulfate 325 milliGRAM(s) Oral daily  folic acid 1 milliGRAM(s) Oral daily  heparin  Injectable 5000 Unit(s) SubCutaneous every 8 hours  insulin glargine Injectable (LANTUS) 28 Unit(s) SubCutaneous at bedtime  insulin lispro (HumaLOG) corrective regimen sliding scale   SubCutaneous three times a day before meals  insulin lispro (HumaLOG) corrective regimen sliding scale   SubCutaneous at bedtime  insulin lispro Injectable (HumaLOG) 10 Unit(s) SubCutaneous three times a day before meals  levothyroxine 50 MICROGram(s) Oral daily  metoprolol tartrate 25 milliGRAM(s) Oral every 8 hours  pantoprazole    Tablet 40 milliGRAM(s) Oral before breakfast  trihexyphenidyl 2 milliGRAM(s) Oral three times a day      Physical Exam  General: Patient comfortable in bed  Vital Signs Last 12 Hrs  T(F): 100.2 (10-06-19 @ 16:00), Max: 100.2 (10-06-19 @ 16:00)  HR: 67 (10-06-19 @ 19:00) (63 - 77)  BP: 114/66 (10-06-19 @ 19:00) (105/62 - 133/64)  BP(mean): 84 (10-06-19 @ 19:00) (77 - 93)  RR: 25 (10-06-19 @ 19:00) (16 - 29)  SpO2: 99% (10-06-19 @ 19:00) (97% - 100%)  Neck: No palpable thyroid nodules.  CVS: S1S2, No murmurs  Respiratory: No wheezing, no crepitations  GI: Abdomen soft, bowel sounds positive  Musculoskeletal:  edema lower extremities.   Skin: No skin rashes, no ecchymosis    Diagnostics

## 2019-10-06 NOTE — PROGRESS NOTE ADULT - ATTENDING COMMENTS
CARDIOLOGY ATTENDING    Agree with above. Supportive care as per CTICU s/p CABG. Continue current medical therapy for stable CAD with mild LV dysfunction. Agree with short term amio for post-op AF.

## 2019-10-07 DIAGNOSIS — Z95.1 PRESENCE OF AORTOCORONARY BYPASS GRAFT: ICD-10-CM

## 2019-10-07 DIAGNOSIS — N18.9 CHRONIC KIDNEY DISEASE, UNSPECIFIED: ICD-10-CM

## 2019-10-07 LAB
ALBUMIN SERPL ELPH-MCNC: 2.8 G/DL — LOW (ref 3.3–5)
ALP SERPL-CCNC: 58 U/L — SIGNIFICANT CHANGE UP (ref 40–120)
ALT FLD-CCNC: <5 U/L — LOW (ref 10–45)
ANION GAP SERPL CALC-SCNC: 14 MMOL/L — SIGNIFICANT CHANGE UP (ref 5–17)
AST SERPL-CCNC: 21 U/L — SIGNIFICANT CHANGE UP (ref 10–40)
BILIRUB SERPL-MCNC: 0.4 MG/DL — SIGNIFICANT CHANGE UP (ref 0.2–1.2)
BUN SERPL-MCNC: 57 MG/DL — HIGH (ref 7–23)
CALCIUM SERPL-MCNC: 8.7 MG/DL — SIGNIFICANT CHANGE UP (ref 8.4–10.5)
CHLORIDE SERPL-SCNC: 98 MMOL/L — SIGNIFICANT CHANGE UP (ref 96–108)
CO2 SERPL-SCNC: 23 MMOL/L — SIGNIFICANT CHANGE UP (ref 22–31)
CREAT SERPL-MCNC: 3.13 MG/DL — HIGH (ref 0.5–1.3)
GLUCOSE SERPL-MCNC: 78 MG/DL — SIGNIFICANT CHANGE UP (ref 70–99)
HCT VFR BLD CALC: 26 % — LOW (ref 39–50)
HGB BLD-MCNC: 8.8 G/DL — LOW (ref 13–17)
MAGNESIUM SERPL-MCNC: 2.3 MG/DL — SIGNIFICANT CHANGE UP (ref 1.6–2.6)
MCHC RBC-ENTMCNC: 30.1 PG — SIGNIFICANT CHANGE UP (ref 27–34)
MCHC RBC-ENTMCNC: 33.8 GM/DL — SIGNIFICANT CHANGE UP (ref 32–36)
MCV RBC AUTO: 89 FL — SIGNIFICANT CHANGE UP (ref 80–100)
NRBC # BLD: 0 /100 WBCS — SIGNIFICANT CHANGE UP (ref 0–0)
PHOSPHATE SERPL-MCNC: 3.2 MG/DL — SIGNIFICANT CHANGE UP (ref 2.5–4.5)
PLATELET # BLD AUTO: 284 K/UL — SIGNIFICANT CHANGE UP (ref 150–400)
POTASSIUM SERPL-MCNC: 3.7 MMOL/L — SIGNIFICANT CHANGE UP (ref 3.5–5.3)
POTASSIUM SERPL-SCNC: 3.7 MMOL/L — SIGNIFICANT CHANGE UP (ref 3.5–5.3)
PROT SERPL-MCNC: 6.2 G/DL — SIGNIFICANT CHANGE UP (ref 6–8.3)
RBC # BLD: 2.92 M/UL — LOW (ref 4.2–5.8)
RBC # FLD: 14.8 % — HIGH (ref 10.3–14.5)
SODIUM SERPL-SCNC: 135 MMOL/L — SIGNIFICANT CHANGE UP (ref 135–145)
WBC # BLD: 11.18 K/UL — HIGH (ref 3.8–10.5)
WBC # FLD AUTO: 11.18 K/UL — HIGH (ref 3.8–10.5)

## 2019-10-07 PROCEDURE — 71045 X-RAY EXAM CHEST 1 VIEW: CPT | Mod: 26

## 2019-10-07 PROCEDURE — 99233 SBSQ HOSP IP/OBS HIGH 50: CPT

## 2019-10-07 RX ORDER — POTASSIUM CHLORIDE 20 MEQ
40 PACKET (EA) ORAL ONCE
Refills: 0 | Status: COMPLETED | OUTPATIENT
Start: 2019-10-07 | End: 2019-10-07

## 2019-10-07 RX ORDER — DOCUSATE SODIUM 100 MG
100 CAPSULE ORAL THREE TIMES A DAY
Refills: 0 | Status: DISCONTINUED | OUTPATIENT
Start: 2019-10-07 | End: 2019-10-07

## 2019-10-07 RX ORDER — AMIODARONE HYDROCHLORIDE 400 MG/1
200 TABLET ORAL DAILY
Refills: 0 | Status: DISCONTINUED | OUTPATIENT
Start: 2019-10-07 | End: 2019-10-15

## 2019-10-07 RX ADMIN — Medication 50 MICROGRAM(S): at 05:32

## 2019-10-07 RX ADMIN — Medication 10 UNIT(S): at 09:38

## 2019-10-07 RX ADMIN — Medication 25 MILLIGRAM(S): at 21:17

## 2019-10-07 RX ADMIN — Medication 2 MILLIGRAM(S): at 14:52

## 2019-10-07 RX ADMIN — Medication 100 MILLIGRAM(S): at 14:40

## 2019-10-07 RX ADMIN — CHLORHEXIDINE GLUCONATE 1 APPLICATION(S): 213 SOLUTION TOPICAL at 05:33

## 2019-10-07 RX ADMIN — HEPARIN SODIUM 5000 UNIT(S): 5000 INJECTION INTRAVENOUS; SUBCUTANEOUS at 21:17

## 2019-10-07 RX ADMIN — OXYCODONE AND ACETAMINOPHEN 2 TABLET(S): 5; 325 TABLET ORAL at 12:32

## 2019-10-07 RX ADMIN — Medication 40 MILLIEQUIVALENT(S): at 04:12

## 2019-10-07 RX ADMIN — Medication 100 MILLIGRAM(S): at 21:18

## 2019-10-07 RX ADMIN — Medication 10 UNIT(S): at 18:34

## 2019-10-07 RX ADMIN — SODIUM CHLORIDE 3 MILLILITER(S): 9 INJECTION INTRAMUSCULAR; INTRAVENOUS; SUBCUTANEOUS at 21:18

## 2019-10-07 RX ADMIN — HEPARIN SODIUM 5000 UNIT(S): 5000 INJECTION INTRAVENOUS; SUBCUTANEOUS at 14:40

## 2019-10-07 RX ADMIN — CARBIDOPA AND LEVODOPA 2 TABLET(S): 25; 100 TABLET ORAL at 14:52

## 2019-10-07 RX ADMIN — AMIODARONE HYDROCHLORIDE 200 MILLIGRAM(S): 400 TABLET ORAL at 05:32

## 2019-10-07 RX ADMIN — Medication 1 MILLIGRAM(S): at 11:56

## 2019-10-07 RX ADMIN — Medication 10 UNIT(S): at 14:00

## 2019-10-07 RX ADMIN — Medication 20 MILLIGRAM(S): at 05:32

## 2019-10-07 RX ADMIN — CARBIDOPA AND LEVODOPA 2 TABLET(S): 25; 100 TABLET ORAL at 21:18

## 2019-10-07 RX ADMIN — CARBIDOPA AND LEVODOPA 2 TABLET(S): 25; 100 TABLET ORAL at 05:32

## 2019-10-07 RX ADMIN — Medication 325 MILLIGRAM(S): at 11:56

## 2019-10-07 RX ADMIN — Medication 25 MILLIGRAM(S): at 14:40

## 2019-10-07 RX ADMIN — Medication 25 MILLIGRAM(S): at 05:31

## 2019-10-07 RX ADMIN — HEPARIN SODIUM 5000 UNIT(S): 5000 INJECTION INTRAVENOUS; SUBCUTANEOUS at 05:32

## 2019-10-07 RX ADMIN — PANTOPRAZOLE SODIUM 40 MILLIGRAM(S): 20 TABLET, DELAYED RELEASE ORAL at 06:07

## 2019-10-07 RX ADMIN — ATORVASTATIN CALCIUM 80 MILLIGRAM(S): 80 TABLET, FILM COATED ORAL at 21:17

## 2019-10-07 RX ADMIN — Medication 100 MILLIGRAM(S): at 05:32

## 2019-10-07 RX ADMIN — Medication 2 MILLIGRAM(S): at 05:32

## 2019-10-07 RX ADMIN — Medication 2 MILLIGRAM(S): at 21:17

## 2019-10-07 RX ADMIN — SODIUM CHLORIDE 3 MILLILITER(S): 9 INJECTION INTRAMUSCULAR; INTRAVENOUS; SUBCUTANEOUS at 14:36

## 2019-10-07 RX ADMIN — INSULIN GLARGINE 28 UNIT(S): 100 INJECTION, SOLUTION SUBCUTANEOUS at 22:08

## 2019-10-07 NOTE — PROGRESS NOTE ADULT - SUBJECTIVE AND OBJECTIVE BOX
ORLIN HARDIN  MRN#: [id number]  Subjective:  Patient was seen and evaluate on AM rounds offering no specific complaints at this time.    OBJECTIVE:  ICU Vital Signs Last 24 Hrs  T(C): 37.1 (07 Oct 2019 04:00), Max: 37.9 (06 Oct 2019 16:00)  T(F): 98.8 (07 Oct 2019 04:00), Max: 100.2 (06 Oct 2019 16:00)  HR: 62 (07 Oct 2019 10:00) (60 - 77)  BP: 109/61 (07 Oct 2019 10:00) (97/62 - 142/76)  BP(mean): 80 (07 Oct 2019 10:00) (74 - 104)  ABP: --  ABP(mean): --  RR: 12 (07 Oct 2019 10:00) (7 - 29)  SpO2: 94% (07 Oct 2019 10:00) (92% - 100%)      10-06 @ 07:01  -  10-07 @ 07:00  --------------------------------------------------------  IN: 940 mL / OUT: 2265 mL / NET: -1325 mL    10-07 @ 07:01  -  10-07 @ 11:13  --------------------------------------------------------  IN: 0 mL / OUT: 250 mL / NET: -250 mL        10-06-19 @ 07:01  -  10-07-19 @ 07:00  --------------------------------------------------------  IN: 940 mL / OUT: 2265 mL / NET: -1325 mL    10-07-19 @ 07:01  -  10-07-19 @ 11:13  --------------------------------------------------------  IN: 0 mL / OUT: 250 mL / NET: -250 mL      PHYSICAL EXAM:Daily     Daily   General: WN/WD NAD    HEENT:     + NCAT  + EOMI  - Conjuctival edema   - Icterus   - Thrush   - ETT  - NGT/OGT  Neck:         + FROM    + JVD     - Nodes     - Masses    + Mid-line trachea   - Tracheostomy  Chest:         - Sternal click  - Sternal drainage  - Pacing wires  - Chest tubes  - SubQ emphysema  Lungs:          + CTA   - Rhonchi    - Rales    - Wheezing     - Decreased BS   - Dullness R L  Cardiac:       + S1 + S2    + RRR   - Irregular   - S3  - S4    - Murmurs   - Rub   - Hamman’s sign   Abdomen:    + BS     + Soft    + Non-tender     - Distended    - Organomegaly  - PEG  Extremities:   - Cyanosis U/L   - Clubbing  U/L  - LE/UE Edema   + Capillary refill    + Pulses   Neuro:        + Awake   +  Alert   - Confused   - Lethargic   - Sedated   - Generalized Weakness  Skin:        - Rashes    - Erythema   + Normal incisions   + IV sites intact  - Sacral decubitus    HOSPITAL MEDICATIONS: All mediciations reviewed and analyzed  MEDICATIONS  (STANDING):  acetaminophen   Tablet .. 325 milliGRAM(s) Oral once  amiodarone    Tablet 200 milliGRAM(s) Oral daily  aspirin enteric coated 325 milliGRAM(s) Oral daily  atorvastatin 80 milliGRAM(s) Oral at bedtime  carbidopa/levodopa  25/100 2 Tablet(s) Oral three times a day  chlorhexidine 2% Cloths 1 Application(s) Topical <User Schedule>  dextrose 5%. 1000 milliLiter(s) (50 mL/Hr) IV Continuous <Continuous>  docusate sodium 100 milliGRAM(s) Oral three times a day  epoetin mari Injectable 4000 Unit(s) SubCutaneous every 7 days  ferrous    sulfate 325 milliGRAM(s) Oral daily  folic acid 1 milliGRAM(s) Oral daily  heparin  Injectable 5000 Unit(s) SubCutaneous every 8 hours  insulin glargine Injectable (LANTUS) 28 Unit(s) SubCutaneous at bedtime  insulin lispro (HumaLOG) corrective regimen sliding scale   SubCutaneous three times a day before meals  insulin lispro (HumaLOG) corrective regimen sliding scale   SubCutaneous at bedtime  insulin lispro Injectable (HumaLOG) 10 Unit(s) SubCutaneous three times a day before meals  levothyroxine 50 MICROGram(s) Oral daily  metoprolol tartrate 25 milliGRAM(s) Oral every 8 hours  pantoprazole    Tablet 40 milliGRAM(s) Oral before breakfast  torsemide 20 milliGRAM(s) Oral daily  trihexyphenidyl 2 milliGRAM(s) Oral three times a day    MEDICATIONS  (PRN):  dextrose 40% Gel 15 Gram(s) Oral once PRN Blood Glucose LESS THAN 70 milliGRAM(s)/deciliter  glucagon  Injectable 1 milliGRAM(s) IntraMuscular once PRN Glucose LESS THAN 70 milligrams/deciliter  oxyCODONE    5 mG/acetaminophen 325 mG 1 Tablet(s) Oral every 6 hours PRN Moderate Pain (4 - 6)  oxyCODONE    5 mG/acetaminophen 325 mG 2 Tablet(s) Oral every 6 hours PRN Severe Pain (7 - 10)    LABS: All Lab data reviewed and analyzed                        8.8    11.18 )-----------( 284      ( 07 Oct 2019 00:44 )             26.0    10-07    135  |  98  |  57<H>  ----------------------------<  78  3.7   |  23  |  3.13<H>    Ca    8.7      07 Oct 2019 00:44  Phos  3.2     10-07  Mg     2.3     10-07    TPro  6.2  /  Alb  2.8<L>  /  TBili  0.4  /  DBili  x   /  AST  21  /  ALT  <5<L>  /  AlkPhos  58  10-07     LIVER FUNCTIONS - ( 07 Oct 2019 00:44 )  Alb: 2.8 g/dL / Pro: 6.2 g/dL / ALK PHOS: 58 U/L / ALT: <5 U/L / AST: 21 U/L / GGT: x             RADIOLOGY: - Reviewed and analyzed     Assessment: S/P C5L    Plan:    Resolving postopertaive respiratory insufficiency  Respiratory status required supplemental oxygen & the following of continuous pulse oximetry for support & to prevent decompensation  Continued early mobilization as tolerated  Addressed analgesic regimen to optimize function  ASA continued for graft occlusion-thromboembolism prophylaxis  Lipitor for long term graft patency  Heparin continued for VTE prophylaxis in addition to Venodyne boots  Continuing Parkinson's medications  Protonix/Pepcid maintained for GI bleeding prophylaxis  Lopressor initiated/continued for atrial fibrillation prophylaxis  Metabolic stability & infection prophylaxis required review and adjustment of regular Insulin sliding scale and gylcemic regimen while following serial glucose levels to help achieve & maintain euglycemia  Reviewed & addressed surgical site infection prophylaxis regimen ORLIN HARDIN  MRN#: [id number]  Subjective:  Patient was seen and evaluate on AM rounds offering no specific complaints at this time.    OBJECTIVE:  ICU Vital Signs Last 24 Hrs  T(C): 37.1 (07 Oct 2019 04:00), Max: 37.9 (06 Oct 2019 16:00)  T(F): 98.8 (07 Oct 2019 04:00), Max: 100.2 (06 Oct 2019 16:00)  HR: 62 (07 Oct 2019 10:00) (60 - 77)  BP: 109/61 (07 Oct 2019 10:00) (97/62 - 142/76)  BP(mean): 80 (07 Oct 2019 10:00) (74 - 104)  ABP: --  ABP(mean): --  RR: 12 (07 Oct 2019 10:00) (7 - 29)  SpO2: 94% (07 Oct 2019 10:00) (92% - 100%)      10-06 @ 07:01  -  10-07 @ 07:00  --------------------------------------------------------  IN: 940 mL / OUT: 2265 mL / NET: -1325 mL    10-07 @ 07:01  -  10-07 @ 11:13  --------------------------------------------------------  IN: 0 mL / OUT: 250 mL / NET: -250 mL        10-06-19 @ 07:01  -  10-07-19 @ 07:00  --------------------------------------------------------  IN: 940 mL / OUT: 2265 mL / NET: -1325 mL    10-07-19 @ 07:01  -  10-07-19 @ 11:13  --------------------------------------------------------  IN: 0 mL / OUT: 250 mL / NET: -250 mL      PHYSICAL EXAM:Daily     Daily   General: WN/WD NAD    HEENT:     + NCAT  + EOMI  - Conjuctival edema   - Icterus   - Thrush   - ETT  - NGT/OGT  Neck:         + FROM    + JVD     - Nodes     - Masses    + Mid-line trachea   - Tracheostomy  Chest:         - Sternal click  - Sternal drainage  - Pacing wires  - Chest tubes  - SubQ emphysema  Lungs:          + CTA   - Rhonchi    - Rales    - Wheezing     - Decreased BS   - Dullness R L  Cardiac:       + S1 + S2    + RRR   - Irregular   - S3  - S4    - Murmurs   - Rub   - Hamman’s sign   Abdomen:    + BS     + Soft    + Non-tender     - Distended    - Organomegaly  - PEG  Extremities:   - Cyanosis U/L   - Clubbing  U/L  - LE/UE Edema   + Capillary refill    + Pulses   Neuro:        + Awake   +  Alert   - Confused   - Lethargic   - Sedated   - Generalized Weakness  Skin:        - Rashes    - Erythema   + Normal incisions   + IV sites intact  - Sacral decubitus    HOSPITAL MEDICATIONS: All mediciations reviewed and analyzed  MEDICATIONS  (STANDING):  acetaminophen   Tablet .. 325 milliGRAM(s) Oral once  amiodarone    Tablet 200 milliGRAM(s) Oral daily  aspirin enteric coated 325 milliGRAM(s) Oral daily  atorvastatin 80 milliGRAM(s) Oral at bedtime  carbidopa/levodopa  25/100 2 Tablet(s) Oral three times a day  chlorhexidine 2% Cloths 1 Application(s) Topical <User Schedule>  dextrose 5%. 1000 milliLiter(s) (50 mL/Hr) IV Continuous <Continuous>  docusate sodium 100 milliGRAM(s) Oral three times a day  epoetin mari Injectable 4000 Unit(s) SubCutaneous every 7 days  ferrous    sulfate 325 milliGRAM(s) Oral daily  folic acid 1 milliGRAM(s) Oral daily  heparin  Injectable 5000 Unit(s) SubCutaneous every 8 hours  insulin glargine Injectable (LANTUS) 28 Unit(s) SubCutaneous at bedtime  insulin lispro (HumaLOG) corrective regimen sliding scale   SubCutaneous three times a day before meals  insulin lispro (HumaLOG) corrective regimen sliding scale   SubCutaneous at bedtime  insulin lispro Injectable (HumaLOG) 10 Unit(s) SubCutaneous three times a day before meals  levothyroxine 50 MICROGram(s) Oral daily  metoprolol tartrate 25 milliGRAM(s) Oral every 8 hours  pantoprazole    Tablet 40 milliGRAM(s) Oral before breakfast  torsemide 20 milliGRAM(s) Oral daily  trihexyphenidyl 2 milliGRAM(s) Oral three times a day    MEDICATIONS  (PRN):  dextrose 40% Gel 15 Gram(s) Oral once PRN Blood Glucose LESS THAN 70 milliGRAM(s)/deciliter  glucagon  Injectable 1 milliGRAM(s) IntraMuscular once PRN Glucose LESS THAN 70 milligrams/deciliter  oxyCODONE    5 mG/acetaminophen 325 mG 1 Tablet(s) Oral every 6 hours PRN Moderate Pain (4 - 6)  oxyCODONE    5 mG/acetaminophen 325 mG 2 Tablet(s) Oral every 6 hours PRN Severe Pain (7 - 10)    LABS: All Lab data reviewed and analyzed                        8.8    11.18 )-----------( 284      ( 07 Oct 2019 00:44 )             26.0    10-07    135  |  98  |  57<H>  ----------------------------<  78  3.7   |  23  |  3.13<H>    Ca    8.7      07 Oct 2019 00:44  Phos  3.2     10-07  Mg     2.3     10-07    TPro  6.2  /  Alb  2.8<L>  /  TBili  0.4  /  DBili  x   /  AST  21  /  ALT  <5<L>  /  AlkPhos  58  10-07     LIVER FUNCTIONS - ( 07 Oct 2019 00:44 )  Alb: 2.8 g/dL / Pro: 6.2 g/dL / ALK PHOS: 58 U/L / ALT: <5 U/L / AST: 21 U/L / GGT: x             RADIOLOGY: - Reviewed and analyzed     Assessment: S/P C5L    Plan:    Resolving postopertaive respiratory insufficiency  Respiratory status required supplemental oxygen & the following of continuous pulse oximetry for support & to prevent decompensation  Continued early mobilization as tolerated  Addressed analgesic regimen to optimize function  ASA continued for graft occlusion-thromboembolism prophylaxis  Lipitor for long term graft patency  Heparin continued for VTE prophylaxis in addition to Venodyne boots  Continuing Parkinson's medications  Protonix maintained for GI bleeding prophylaxis  Lopressor continued for atrial fibrillation prophylaxis

## 2019-10-07 NOTE — PROGRESS NOTE ADULT - ASSESSMENT
68 yo M PMHx CAD s/p stent x 1 (2010), T2DM, HTN, Parkinson's presenting with c/o 10/10 chest pain described as burning radiating from abdomen to midsternum x 1 day. Patient reports symptoms started at 2 pm and resolved after 1 hour. Chest pain began after he ate lunch and was walking up a flight of stairs. He reports chest pain was associated with dyspnea, diaphoresis, and nausea. At baseline, he ambulates with cane/walker. Takes medications daily including aspirin and prasugrel. On ROS, reports chronic left leg swelling x 1-2 years. Pt s/p cardiac cath at [facility] found to have multivessel disease requiring transfer to [facility] CT surgery service.   9/26 Nephrology consulted, aldactone discontinued. Pre op Transthoracic Echocardiogram Mitral annular calcification, otherwise normal mitral  valve. Calcified trileaflet aortic valve with normal opening. Mild aortic regurgitation. Mild concentric left ventricular hypertrophy. Endocardium not well visualized; grossly normal left ventricular systolic function.  9/27 VVS; Pre op Cardiac Surgery work up in progress. PT evaluation to assess pre op baseline activity level.  Plan for cardiac surgery in next week Wednesday with Dr. Guillory.  On 10/2/19 C5L LIMA to LAD, SVG to RPDA, SVG to RPL, SVG to Ramus, SVG to OM.   Post op Course:   Extubated on POD # 1  Pressor support required à weaned off   (+) dizziness à Neurology following à Unclear of etiology   CKD à Renal following; on Torsemide 10 mg PO daily    Hyperglycemia / Hypothyroid à Endo following. Continue on Synthroid 50 mcg PO daily.   10/7 Transferred to 2 [facility].

## 2019-10-07 NOTE — PROGRESS NOTE ADULT - SUBJECTIVE AND OBJECTIVE BOX
VITAL SIGNS    Subjective: "I'm  doing ok." Denies CP, palpitation, SOB, NEWBY, HA, dizziness, N/V/D, fever or chills.  No acute event noted overnight.     Telemetry: NSR 1st degree 50-60       Vital Signs Last 24 Hrs  T(C): 36.7 (10-07-19 @ 14:27), Max: 37.9 (10-06-19 @ 20:00)  T(F): 98.1 (10-07-19 @ 14:27), Max: 100.2 (10-06-19 @ 20:00)  HR: 63 (10-07-19 @ 14:27) (60 - 70)  BP: 114/68 (10-07-19 @ 14:27) (97/62 - 142/76)  RR: 16 (10-07-19 @ 14:27) (7 - 26)  SpO2: 94% (10-07-19 @ 14:27) (92% - 99%)           10-06 @ 07:01  -  10-07 @ 07:00  --------------------------------------------------------  IN: 940 mL / OUT: 2265 mL / NET: -1325 mL    10-07 @ 07:01  -  10-07 @ 16:15  --------------------------------------------------------  IN: 340 mL / OUT: 1150 mL / NET: -810 mL    Daily     Daily     CAPILLARY BLOOD GLUCOSE  104 (06 Oct 2019 22:00)  120 (06 Oct 2019 18:00)    POCT Blood Glucose.: 104 mg/dL (07 Oct 2019 13:53)  POCT Blood Glucose.: 127 mg/dL (07 Oct 2019 09:40)  POCT Blood Glucose.: 119 mg/dL (07 Oct 2019 08:19)  POCT Blood Glucose.: 104 mg/dL (06 Oct 2019 21:46)  POCT Blood Glucose.: 126 mg/dL (06 Oct 2019 17:43)        PHYSICAL EXAM    Neurology: alert and oriented x 3, nonfocal, no gross deficits    CV: (+) S1 and S2, No murmurs, rubs, gallops or clicks     Sternal Wound:  MSI -->CDI , sternum stable    Lungs: CTA B/L     Abdomen: soft, nontender, nondistended, positive bowel sounds, (+) Flatus; (+) BM     : Indwelling johnson cath --> SD             Extremities:  B/L LE (+) 1 edema; negative calf tenderness; (+) 2 DP palpable        acetaminophen Tablet .. 325 milliGRAM(s) Oral once  amiodarone Tablet 200 milliGRAM(s) Oral daily  aspirin enteric coated 325 milliGRAM(s) Oral daily  atorvastatin 80 milliGRAM(s) Oral at bedtime  carbidopa/levodopa  25/100 2 Tablet(s) Oral three times a day  chlorhexidine 2% Cloths 1 Application(s) Topical <User Schedule>  dextrose 40% Gel 15 Gram(s) Oral once PRN  docusate sodium 100 milliGRAM(s) Oral three times a day  epoetin mari Injectable 4000 Unit(s) SubCutaneous every 7 days  ferrous sulfate 325 milliGRAM(s) Oral daily  folic acid 1 milliGRAM(s) Oral daily  glucagon Injectable 1 milliGRAM(s) IntraMuscular once PRN  heparin Injectable 5000 Unit(s) SubCutaneous every 8 hours  insulin glargine Injectable (LANTUS) 28 Unit(s) SubCutaneous at bedtime  insulin lispro (HumaLOG) corrective regimen sliding scale SubCutaneous three times a day before meals  insulin lispro (HumaLOG) corrective regimen sliding scale SubCutaneous at bedtime  insulin lispro Injectable (HumaLOG) 10 Unit(s) SubCutaneous three times a day before meals  levothyroxine 50 MICROGram(s) Oral daily  metoprolol tartrate 25 milliGRAM(s) Oral every 8 hours  oxyCODONE 5 mG/acetaminophen 325 mG 1 Tablet(s) Oral every 6 hours PRN  oxyCODONE 5 mG/acetaminophen 325 mG 2 Tablet(s) Oral every 6 hours PRN  pantoprazole Tablet 40 milliGRAM(s) Oral before breakfast  trihexyphenidyl 2 milliGRAM(s) Oral three times a day    Physical Therapy Rec:   Home  [  ]   Home w/ PT  [  ]  Rehab  [ X ]    Discussed with Cardiothoracic Team at AM rounds.

## 2019-10-07 NOTE — OCCUPATIONAL THERAPY INITIAL EVALUATION ADULT - PERTINENT HX OF CURRENT PROBLEM, REHAB EVAL
69M PMHx CAD s/p stent x 1 (2010), T2DM, HTN, Parkinson's presenting with c/o 10/10 chest pain described as burning radiating from abdomen to midsternum x 1 day. He reports chest pain was associated with dyspnea, diaphoresis, and nausea. At baseline, he ambulates with cane/walker. On ROS, reports chronic left leg swelling x 1-2 years. Pt s/p cardiac cath at [facility] found to have multivessel disease requiring transfer to [facility] CT surgery service.

## 2019-10-07 NOTE — PROGRESS NOTE ADULT - ASSESSMENT
Assessment  DMT2: 69y Male with DM T2 with hyperglycemia, A1C 7.2%, was on oral meds and insulin at home, now S/P CABG stepped down, on insulin, blood sugars trending within acceptable range, no hypoglycemic episodes, eating partial meals, ambulating, comfortable.  Hypothyroidism: Patient has no hx thyroid dz, was not on any thyroid supplements, found  incidentally with TSH 6.22 and was started on synthroid 50mcg PO daily.  CAD: postop CABG 10/2, now stepped down, on medications, no chest pain, stable, monitored.  HTN: Controlled,  on antihypertensive medications.  Parkinson's: on meds, stable.          Kelsie Reece MD  Cell: [phone]  Office: [phone] Assessment  DMT2: 69y Male with DM T2 with hyperglycemia, A1C 7.2%, was on oral meds and insulin at home, now S/P CABG stepped  down, on insulin, blood sugars trending within acceptable range, no hypoglycemic episodes, eating partial meals, ambulating, comfortable.  Hypothyroidism: Patient has no hx thyroid dz, was not on any thyroid supplements, found  incidentally with TSH 6.22 and was started on synthroid 50mcg PO daily.  CAD: postop CABG 10/2, now stepped down, on medications, no chest pain, stable, monitored.  HTN: Controlled,  on antihypertensive medications.  Parkinson's: on meds, stable.          Kelsie Reece MD  Cell: [phone]  Office: [phone]

## 2019-10-07 NOTE — OCCUPATIONAL THERAPY INITIAL EVALUATION ADULT - DIAGNOSIS, OT EVAL
Pt p/w decreased ADLs and functional mobility due to decreased strength, endurance, balance, and coordination.

## 2019-10-07 NOTE — PROGRESS NOTE ADULT - SUBJECTIVE AND OBJECTIVE BOX
NEPHROLOGY-[facility] [phone]        Patient seen and examined         MEDICATIONS  (STANDING):  acetaminophen   Tablet .. 325 milliGRAM(s) Oral once  amiodarone    Tablet 200 milliGRAM(s) Oral daily  aspirin enteric coated 325 milliGRAM(s) Oral daily  atorvastatin 80 milliGRAM(s) Oral at bedtime  carbidopa/levodopa  25/100 2 Tablet(s) Oral three times a day  chlorhexidine 2% Cloths 1 Application(s) Topical <User Schedule>  dextrose 5%. 1000 milliLiter(s) (50 mL/Hr) IV Continuous <Continuous>  dextrose 50% Injectable 12.5 Gram(s) IV Push once  dextrose 50% Injectable 25 Gram(s) IV Push once  dextrose 50% Injectable 25 Gram(s) IV Push once  dextrose 50% Injectable 50 milliLiter(s) IV Push every 15 minutes  dextrose 50% Injectable 25 milliLiter(s) IV Push every 15 minutes  docusate sodium 100 milliGRAM(s) Oral three times a day  epoetin mari Injectable 4000 Unit(s) SubCutaneous every 7 days  ferrous    sulfate 325 milliGRAM(s) Oral daily  folic acid 1 milliGRAM(s) Oral daily  heparin  Injectable 5000 Unit(s) SubCutaneous every 8 hours  insulin glargine Injectable (LANTUS) 28 Unit(s) SubCutaneous at bedtime  insulin lispro (HumaLOG) corrective regimen sliding scale   SubCutaneous three times a day before meals  insulin lispro (HumaLOG) corrective regimen sliding scale   SubCutaneous at bedtime  insulin lispro Injectable (HumaLOG) 10 Unit(s) SubCutaneous three times a day before meals  levothyroxine 50 MICROGram(s) Oral daily  metoprolol tartrate 25 milliGRAM(s) Oral every 8 hours  pantoprazole    Tablet 40 milliGRAM(s) Oral before breakfast  torsemide 20 milliGRAM(s) Oral daily  trihexyphenidyl 2 milliGRAM(s) Oral three times a day      VITAL:  T(C): , Max: 37.9 (10-06-19 @ 16:00)  T(F): , Max: 100.2 (10-06-19 @ 16:00)  HR: 62 (10-07-19 @ 08:00)  BP: 113/58 (10-07-19 @ 08:00)  BP(mean): 79 (10-07-19 @ 08:00)  RR: 8 (10-07-19 @ 08:00)  SpO2: 95% (10-07-19 @ 08:00)  Wt(kg): --    I and O's:    10-06 @ 07:01  -  10-07 @ 07:00  --------------------------------------------------------  IN: 940 mL / OUT: 2265 mL / NET: -1325 mL          PHYSICAL EXAM:    Constitutional: NAD  Neck:  No JVD  Respiratory: CTAB/L  Cardiovascular: S1 and S2  Gastrointestinal: BS+, soft, NT/ND  Extremities: No peripheral edema  Neurological: A/O x 3, no focal deficits  Psychiatric: Normal mood, normal affect  : No Javier  Skin: No rashes  Access: Not applicable    LABS:                        8.8    11.18 )-----------( 284      ( 07 Oct 2019 00:44 )             26.0     10-07    135  |  98  |  57<H>  ----------------------------<  78  3.7   |  23  |  3.13<H>    Ca    8.7      07 Oct 2019 00:44  Phos  3.2     10-07  Mg     2.3     10-07    TPro  6.2  /  Alb  2.8<L>  /  TBili  0.4  /  DBili  x   /  AST  21  /  ALT  <5<L>  /  AlkPhos  58  10-07          Urine Studies:          RADIOLOGY & ADDITIONAL STUDIES:

## 2019-10-07 NOTE — OCCUPATIONAL THERAPY INITIAL EVALUATION ADULT - LIVES WITH, PROFILE
Pt lives with son and daughter in law in a pvt home, 5 VIKI and no steps inside. Pt has a walk in shower, grab bars, and shower bench. PTA Pt I in all ADLs and functional mobility;

## 2019-10-07 NOTE — PROGRESS NOTE ADULT - SUBJECTIVE AND OBJECTIVE BOX
Chief complaint  Patient is a 69y old  Male who presents with a chief complaint of Multivessel disease (07 Oct 2019 12:18)   Review of systems  Patient in bed, looks comfortable, no fever, no hypoglycemia.    Labs and Fingersticks  CAPILLARY BLOOD GLUCOSE  104 (06 Oct 2019 22:00)  120 (06 Oct 2019 18:00)      POCT Blood Glucose.: 127 mg/dL (07 Oct 2019 09:40)  POCT Blood Glucose.: 119 mg/dL (07 Oct 2019 08:19)  POCT Blood Glucose.: 104 mg/dL (06 Oct 2019 21:46)  POCT Blood Glucose.: 126 mg/dL (06 Oct 2019 17:43)      Anion Gap, Serum: 14 (10-07 @ 00:44)  Anion Gap, Serum: 13 (10-06 @ 11:26)  Anion Gap, Serum: 11 (10-06 @ 01:08)      Calcium, Total Serum: 8.7 (10-07 @ 00:44)  Calcium, Total Serum: 8.6 (10-06 @ 11:26)  Calcium, Total Serum: 8.7 (10-06 @ 01:08)  Albumin, Serum: 2.8 <L> (10-07 @ 00:44)  Albumin, Serum: 2.8 <L> (10-06 @ 01:08)    Alanine Aminotransferase (ALT/SGPT): <5 <L> (10-07 @ 00:44)  Alanine Aminotransferase (ALT/SGPT): <5 <L> (10-06 @ 01:08)  Alkaline Phosphatase, Serum: 58 (10-07 @ 00:44)  Alkaline Phosphatase, Serum: 58 (10-06 @ 01:08)  Aspartate Aminotransferase (AST/SGOT): 21 (10-07 @ 00:44)  Aspartate Aminotransferase (AST/SGOT): 18 (10-06 @ 01:08)        10-07    135  |  98  |  57<H>  ----------------------------<  78  3.7   |  23  |  3.13<H>    Ca    8.7      07 Oct 2019 00:44  Phos  3.2     10-07  Mg     2.3     10-07    TPro  6.2  /  Alb  2.8<L>  /  TBili  0.4  /  DBili  x   /  AST  21  /  ALT  <5<L>  /  AlkPhos  58  10-07                        8.8    11.18 )-----------( 284      ( 07 Oct 2019 00:44 )             26.0     Medications  MEDICATIONS  (STANDING):  acetaminophen   Tablet .. 325 milliGRAM(s) Oral once  amiodarone    Tablet 200 milliGRAM(s) Oral daily  aspirin enteric coated 325 milliGRAM(s) Oral daily  atorvastatin 80 milliGRAM(s) Oral at bedtime  carbidopa/levodopa  25/100 2 Tablet(s) Oral three times a day  chlorhexidine 2% Cloths 1 Application(s) Topical <User Schedule>  dextrose 5%. 1000 milliLiter(s) (50 mL/Hr) IV Continuous <Continuous>  dextrose 50% Injectable 12.5 Gram(s) IV Push once  dextrose 50% Injectable 25 Gram(s) IV Push once  dextrose 50% Injectable 25 Gram(s) IV Push once  dextrose 50% Injectable 50 milliLiter(s) IV Push every 15 minutes  docusate sodium 100 milliGRAM(s) Oral three times a day  epoetin mari Injectable 4000 Unit(s) SubCutaneous every 7 days  ferrous    sulfate 325 milliGRAM(s) Oral daily  folic acid 1 milliGRAM(s) Oral daily  heparin  Injectable 5000 Unit(s) SubCutaneous every 8 hours  insulin glargine Injectable (LANTUS) 28 Unit(s) SubCutaneous at bedtime  insulin lispro (HumaLOG) corrective regimen sliding scale   SubCutaneous three times a day before meals  insulin lispro (HumaLOG) corrective regimen sliding scale   SubCutaneous at bedtime  insulin lispro Injectable (HumaLOG) 10 Unit(s) SubCutaneous three times a day before meals  levothyroxine 50 MICROGram(s) Oral daily  metoprolol tartrate 25 milliGRAM(s) Oral every 8 hours  pantoprazole    Tablet 40 milliGRAM(s) Oral before breakfast  sodium chloride 0.9% lock flush 3 milliLiter(s) IV Push every 8 hours  trihexyphenidyl 2 milliGRAM(s) Oral three times a day      Physical Exam  General: Patient comfortable in bed  Vital Signs Last 12 Hrs  T(F): 98.8 (10-07-19 @ 04:00), Max: 98.8 (10-07-19 @ 04:00)  HR: 65 (10-07-19 @ 12:00) (61 - 65)  BP: 114/73 (10-07-19 @ 12:00) (100/59 - 140/77)  BP(mean): 87 (10-07-19 @ 12:00) (74 - 100)  RR: 12 (10-07-19 @ 12:00) (7 - 25)  SpO2: 95% (10-07-19 @ 12:00) (92% - 95%)  Neck: No palpable thyroid nodules.  CVS: S1S2, No murmurs  Respiratory: No wheezing, no crepitations  GI: Abdomen soft, bowel sounds positive  Musculoskeletal:  edema lower extremities.   Skin: No skin rashes, no ecchymosis    Diagnostics    Free Thyroxine, Serum: AM Sched. Collection: 30-Sep-2019 06:00 (09-29 @ 10:47) Chief complaint  Patient is a 69y old  Male who presents with a chief complaint of Multivessel disease (07 Oct 2019 12:18)   Review of systems  Patient in bed, looks comfortable,  no fever, no hypoglycemia.    Labs and Fingersticks  CAPILLARY BLOOD GLUCOSE  104 (06 Oct 2019 22:00)  120 (06 Oct 2019 18:00)      POCT Blood Glucose.: 127 mg/dL (07 Oct 2019 09:40)  POCT Blood Glucose.: 119 mg/dL (07 Oct 2019 08:19)  POCT Blood Glucose.: 104 mg/dL (06 Oct 2019 21:46)  POCT Blood Glucose.: 126 mg/dL (06 Oct 2019 17:43)      Anion Gap, Serum: 14 (10-07 @ 00:44)  Anion Gap, Serum: 13 (10-06 @ 11:26)  Anion Gap, Serum: 11 (10-06 @ 01:08)      Calcium, Total Serum: 8.7 (10-07 @ 00:44)  Calcium, Total Serum: 8.6 (10-06 @ 11:26)  Calcium, Total Serum: 8.7 (10-06 @ 01:08)  Albumin, Serum: 2.8 <L> (10-07 @ 00:44)  Albumin, Serum: 2.8 <L> (10-06 @ 01:08)    Alanine Aminotransferase (ALT/SGPT): <5 <L> (10-07 @ 00:44)  Alanine Aminotransferase (ALT/SGPT): <5 <L> (10-06 @ 01:08)  Alkaline Phosphatase, Serum: 58 (10-07 @ 00:44)  Alkaline Phosphatase, Serum: 58 (10-06 @ 01:08)  Aspartate Aminotransferase (AST/SGOT): 21 (10-07 @ 00:44)  Aspartate Aminotransferase (AST/SGOT): 18 (10-06 @ 01:08)        10-07    135  |  98  |  57<H>  ----------------------------<  78  3.7   |  23  |  3.13<H>    Ca    8.7      07 Oct 2019 00:44  Phos  3.2     10-07  Mg     2.3     10-07    TPro  6.2  /  Alb  2.8<L>  /  TBili  0.4  /  DBili  x   /  AST  21  /  ALT  <5<L>  /  AlkPhos  58  10-07                        8.8    11.18 )-----------( 284      ( 07 Oct 2019 00:44 )             26.0     Medications  MEDICATIONS  (STANDING):  acetaminophen   Tablet .. 325 milliGRAM(s) Oral once  amiodarone    Tablet 200 milliGRAM(s) Oral daily  aspirin enteric coated 325 milliGRAM(s) Oral daily  atorvastatin 80 milliGRAM(s) Oral at bedtime  carbidopa/levodopa  25/100 2 Tablet(s) Oral three times a day  chlorhexidine 2% Cloths 1 Application(s) Topical <User Schedule>  dextrose 5%. 1000 milliLiter(s) (50 mL/Hr) IV Continuous <Continuous>  dextrose 50% Injectable 12.5 Gram(s) IV Push once  dextrose 50% Injectable 25 Gram(s) IV Push once  dextrose 50% Injectable 25 Gram(s) IV Push once  dextrose 50% Injectable 50 milliLiter(s) IV Push every 15 minutes  docusate sodium 100 milliGRAM(s) Oral three times a day  epoetin mari Injectable 4000 Unit(s) SubCutaneous every 7 days  ferrous    sulfate 325 milliGRAM(s) Oral daily  folic acid 1 milliGRAM(s) Oral daily  heparin  Injectable 5000 Unit(s) SubCutaneous every 8 hours  insulin glargine Injectable (LANTUS) 28 Unit(s) SubCutaneous at bedtime  insulin lispro (HumaLOG) corrective regimen sliding scale   SubCutaneous three times a day before meals  insulin lispro (HumaLOG) corrective regimen sliding scale   SubCutaneous at bedtime  insulin lispro Injectable (HumaLOG) 10 Unit(s) SubCutaneous three times a day before meals  levothyroxine 50 MICROGram(s) Oral daily  metoprolol tartrate 25 milliGRAM(s) Oral every 8 hours  pantoprazole    Tablet 40 milliGRAM(s) Oral before breakfast  sodium chloride 0.9% lock flush 3 milliLiter(s) IV Push every 8 hours  trihexyphenidyl 2 milliGRAM(s) Oral three times a day      Physical Exam  General: Patient comfortable in bed  Vital Signs Last 12 Hrs  T(F): 98.8 (10-07-19 @ 04:00), Max: 98.8 (10-07-19 @ 04:00)  HR: 65 (10-07-19 @ 12:00) (61 - 65)  BP: 114/73 (10-07-19 @ 12:00) (100/59 - 140/77)  BP(mean): 87 (10-07-19 @ 12:00) (74 - 100)  RR: 12 (10-07-19 @ 12:00) (7 - 25)  SpO2: 95% (10-07-19 @ 12:00) (92% - 95%)  Neck: No palpable thyroid nodules.  CVS: S1S2, No murmurs  Respiratory: No wheezing, no crepitations  GI: Abdomen soft, bowel sounds positive  Musculoskeletal:  edema lower extremities.   Skin: No skin rashes, no ecchymosis    Diagnostics    Free Thyroxine, Serum: AM Sched. Collection: 30-Sep-2019 06:00 (09-29 @ 10:47)

## 2019-10-07 NOTE — PROGRESS NOTE ADULT - SUBJECTIVE AND OBJECTIVE BOX
S: Denies chest pain, SOB, palpitations or dizziness. Review of systems otherwise (-)      MEDICATIONS  (STANDING):  acetaminophen   Tablet .. 325 milliGRAM(s) Oral once  amiodarone    Tablet 200 milliGRAM(s) Oral daily  aspirin enteric coated 325 milliGRAM(s) Oral daily  atorvastatin 80 milliGRAM(s) Oral at bedtime  carbidopa/levodopa  25/100 2 Tablet(s) Oral three times a day  chlorhexidine 2% Cloths 1 Application(s) Topical <User Schedule>  dextrose 5%. 1000 milliLiter(s) (50 mL/Hr) IV Continuous <Continuous>  dextrose 50% Injectable 12.5 Gram(s) IV Push once  dextrose 50% Injectable 25 Gram(s) IV Push once  dextrose 50% Injectable 25 Gram(s) IV Push once  dextrose 50% Injectable 50 milliLiter(s) IV Push every 15 minutes  docusate sodium 100 milliGRAM(s) Oral three times a day  epoetin mari Injectable 4000 Unit(s) SubCutaneous every 7 days  ferrous    sulfate 325 milliGRAM(s) Oral daily  folic acid 1 milliGRAM(s) Oral daily  heparin  Injectable 5000 Unit(s) SubCutaneous every 8 hours  insulin glargine Injectable (LANTUS) 28 Unit(s) SubCutaneous at bedtime  insulin lispro (HumaLOG) corrective regimen sliding scale   SubCutaneous three times a day before meals  insulin lispro (HumaLOG) corrective regimen sliding scale   SubCutaneous at bedtime  insulin lispro Injectable (HumaLOG) 10 Unit(s) SubCutaneous three times a day before meals  levothyroxine 50 MICROGram(s) Oral daily  metoprolol tartrate 25 milliGRAM(s) Oral every 8 hours  pantoprazole    Tablet 40 milliGRAM(s) Oral before breakfast  trihexyphenidyl 2 milliGRAM(s) Oral three times a day    MEDICATIONS  (PRN):  dextrose 40% Gel 15 Gram(s) Oral once PRN Blood Glucose LESS THAN 70 milliGRAM(s)/deciliter  glucagon  Injectable 1 milliGRAM(s) IntraMuscular once PRN Glucose LESS THAN 70 milligrams/deciliter  oxyCODONE    5 mG/acetaminophen 325 mG 1 Tablet(s) Oral every 6 hours PRN Moderate Pain (4 - 6)  oxyCODONE    5 mG/acetaminophen 325 mG 2 Tablet(s) Oral every 6 hours PRN Severe Pain (7 - 10)      LABS:                            8.8    11.18 )-----------( 284      ( 07 Oct 2019 00:44 )             26.0     Hemoglobin: 8.8 g/dL (10-07 @ 00:44)  Hemoglobin: 9.6 g/dL (10-06 @ 01:08)  Hemoglobin: 8.7 g/dL (10-05 @ 01:08)  Hemoglobin: 9.5 g/dL (10-04 @ 01:16)  Hemoglobin: 10.3 g/dL (10-03 @ 01:40)    10-07    135  |  98  |  57<H>  ----------------------------<  78  3.7   |  23  |  3.13<H>    Ca    8.7      07 Oct 2019 00:44  Phos  3.2     10-07  Mg     2.3     10-07    TPro  6.2  /  Alb  2.8<L>  /  TBili  0.4  /  DBili  x   /  AST  21  /  ALT  <5<L>  /  AlkPhos  58  10-07    Creatinine Trend: 3.13<--, 3.07<--, 3.07<--, 3.03<--, 2.79<--, 2.61<--     10-06-19 @ 07:01  -  10-07-19 @ 07:00  --------------------------------------------------------  IN: 940 mL / OUT: 2265 mL / NET: -1325 mL    10-07-19 @ 07:01  -  10-07-19 @ 12:19  --------------------------------------------------------  IN: 0 mL / OUT: 250 mL / NET: -250 mL    PHYSICAL EXAM  Vital Signs Last 24 Hrs  T(C): 37.1 (07 Oct 2019 04:00), Max: 37.9 (06 Oct 2019 16:00)  T(F): 98.8 (07 Oct 2019 04:00), Max: 100.2 (06 Oct 2019 16:00)  HR: 65 (07 Oct 2019 12:00) (60 - 77)  BP: 114/73 (07 Oct 2019 12:00) (97/62 - 142/76)  BP(mean): 87 (07 Oct 2019 12:00) (74 - 104)  RR: 12 (07 Oct 2019 12:00) (7 - 29)  SpO2: 95% (07 Oct 2019 12:00) (92% - 100%)    Gen: Appears well in NAD  HEENT:  (-)icterus (-)pallor  CV: N S1 S2 1/6 JENNIFER (+)2 Pulses B/l  Resp:  Clear to auscultation B/L, normal effort  GI: (+) BS Soft, NT, ND  Lymph:  (+) B/L LE Edema, (-)obvious lymphadenopathy  Skin: Warm to touch, Normal turgor  Psych: Appropriate mood and affect      TELEMETRY: NSR 60s        DIAGNOSTIC TESTING:  [ ] Echocardiogram: < from: Intra-Operative Transesophageal Echo (10.02.19 @ 13:30) >  Conclusions:  1. Mild segmental left ventricular systolic dysfunction.  Apicl hypokinesis  2. Moderate diastolic dysfunction (Stage II).  Confirmed on  10/2/2019 - 14:16:07 by Daniel Duran M.D.  ------------------------------------------------------------------------    < end of copied text >      ASSESSMENT/PLAN:   70 yo M PMHx CAD s/p stent x 1 (2010), T2DM, HTN, Parkinson's, history of meningioma, admitted with NSTEMI, TTE with mild segmental LV dysfxn, cath with multivessel CAD, s/p C5L LIMA-LAD, SVG-RPDA, SVG-RPL, SVG-Ramus, SVG-OM 10/2    - Monitor tele - remains sinus  - Continue medical management ASA, statin   - Agree with short term amio for post-op Afib  - A/C per CTS   - GI / DVT prophylaxis.  - keep K>4, mag >2.0   - Continue BB as tolerated by BP   - Renal f/u - trend creatinine  - Supportive care per CTU appreciated    Yunier Quiroz PA-C  [facility]  Pager: [phone]

## 2019-10-07 NOTE — PROGRESS NOTE ADULT - PROBLEM SELECTOR PLAN 4
Renal following   Monitor renal function   Continue with Torsemide 10 mg PO daily   Maintain johnson --> SD

## 2019-10-07 NOTE — PROGRESS NOTE ADULT - PROBLEM SELECTOR PLAN 1
Continue with  mg PO Daily.  Continue with Lopressor 25 mg PO TID.   Continue with Amio 200 mg PO daily   Continue with Lipitor 80 mg PO HS   Continue with PT/OT   Glycemic Control per Endo   Maintain johnson --> SD   Monitor Renal function   Increase activity as tolerated.   Encourage Chest PT / Pulmonary toileting and Incentive spirometry every 1 hour x 10 while awake.   Continue with PUD and DVT prophylaxis.   Shower on POD #5.   D/C plan Subacute rehab once medically cleared   Plan of care discussed with attending

## 2019-10-07 NOTE — OCCUPATIONAL THERAPY INITIAL EVALUATION ADULT - PLANNED THERAPY INTERVENTIONS, OT EVAL
transfer training/balance training/fine motor coordination training/ADL retraining/bed mobility training/strengthening

## 2019-10-07 NOTE — PROGRESS NOTE ADULT - ASSESSMENT
68 yo M PMHx CAD s/p stent x 1 (2010), T2DM, HTN, Parkinson's and CKD stage 3b presents with chest pain. S/P cath with MVD- POD #5  CABG  Subnephrotic range proteinuria     1. Renal - [id number].  Trend BMP and urine output- ABBY is likely pre-renally mediated- stable      2. CVS   Not on pressors at present. BP is soft      3. Pulm - Appears hypervolemic     4. Hyponatremia- hypervolemic     5. Anemia s/p Epogen 10K x 1 10/5    Recommendations  - Trend renal profile and I agree with torsemide   - Fluid restriction 1.2 L per day   - Procrit 10000 x 1 now  -Creatinine should start to improve soon      d/w CTU team

## 2019-10-07 NOTE — OCCUPATIONAL THERAPY INITIAL EVALUATION ADULT - ADDITIONAL COMMENTS
CT Head (-). Xray Chest (10/7): The heart is markedly enlarged. Small left pleural effusion. The Right   lung is clear. Status post sternotomy. CABG, using internal mammary artery 02-Oct-2019

## 2019-10-08 LAB
ANION GAP SERPL CALC-SCNC: 12 MMOL/L — SIGNIFICANT CHANGE UP (ref 5–17)
BUN SERPL-MCNC: 60 MG/DL — HIGH (ref 7–23)
CALCIUM SERPL-MCNC: 8.8 MG/DL — SIGNIFICANT CHANGE UP (ref 8.4–10.5)
CHLORIDE SERPL-SCNC: 95 MMOL/L — LOW (ref 96–108)
CO2 SERPL-SCNC: 25 MMOL/L — SIGNIFICANT CHANGE UP (ref 22–31)
CREAT SERPL-MCNC: 3.44 MG/DL — HIGH (ref 0.5–1.3)
GLUCOSE SERPL-MCNC: 129 MG/DL — HIGH (ref 70–99)
HCT VFR BLD CALC: 27.6 % — LOW (ref 39–50)
HGB BLD-MCNC: 9.1 G/DL — LOW (ref 13–17)
MCHC RBC-ENTMCNC: 30 PG — SIGNIFICANT CHANGE UP (ref 27–34)
MCHC RBC-ENTMCNC: 33 GM/DL — SIGNIFICANT CHANGE UP (ref 32–36)
MCV RBC AUTO: 91.1 FL — SIGNIFICANT CHANGE UP (ref 80–100)
NRBC # BLD: 0 /100 WBCS — SIGNIFICANT CHANGE UP (ref 0–0)
PLATELET # BLD AUTO: 296 K/UL — SIGNIFICANT CHANGE UP (ref 150–400)
POTASSIUM SERPL-MCNC: 4 MMOL/L — SIGNIFICANT CHANGE UP (ref 3.5–5.3)
POTASSIUM SERPL-SCNC: 4 MMOL/L — SIGNIFICANT CHANGE UP (ref 3.5–5.3)
RBC # BLD: 3.03 M/UL — LOW (ref 4.2–5.8)
RBC # FLD: 14.6 % — HIGH (ref 10.3–14.5)
SODIUM SERPL-SCNC: 132 MMOL/L — LOW (ref 135–145)
WBC # BLD: 9.72 K/UL — SIGNIFICANT CHANGE UP (ref 3.8–10.5)
WBC # FLD AUTO: 9.72 K/UL — SIGNIFICANT CHANGE UP (ref 3.8–10.5)

## 2019-10-08 RX ORDER — POLYETHYLENE GLYCOL 3350 17 G/17G
17 POWDER, FOR SOLUTION ORAL DAILY
Refills: 0 | Status: DISCONTINUED | OUTPATIENT
Start: 2019-10-08 | End: 2019-10-15

## 2019-10-08 RX ORDER — MULTIVIT WITH MIN/MFOLATE/K2 340-15/3 G
1 POWDER (GRAM) ORAL ONCE
Refills: 0 | Status: COMPLETED | OUTPATIENT
Start: 2019-10-09 | End: 2019-10-09

## 2019-10-08 RX ADMIN — INSULIN GLARGINE 28 UNIT(S): 100 INJECTION, SOLUTION SUBCUTANEOUS at 21:55

## 2019-10-08 RX ADMIN — Medication 10 UNIT(S): at 17:50

## 2019-10-08 RX ADMIN — CARBIDOPA AND LEVODOPA 2 TABLET(S): 25; 100 TABLET ORAL at 21:56

## 2019-10-08 RX ADMIN — Medication 2: at 08:45

## 2019-10-08 RX ADMIN — Medication 10 MILLIGRAM(S): at 06:24

## 2019-10-08 RX ADMIN — Medication 25 MILLIGRAM(S): at 13:45

## 2019-10-08 RX ADMIN — ATORVASTATIN CALCIUM 80 MILLIGRAM(S): 80 TABLET, FILM COATED ORAL at 21:56

## 2019-10-08 RX ADMIN — Medication 25 MILLIGRAM(S): at 06:23

## 2019-10-08 RX ADMIN — Medication 100 MILLIGRAM(S): at 21:56

## 2019-10-08 RX ADMIN — Medication 2 MILLIGRAM(S): at 21:56

## 2019-10-08 RX ADMIN — Medication 50 MICROGRAM(S): at 06:23

## 2019-10-08 RX ADMIN — SODIUM CHLORIDE 3 MILLILITER(S): 9 INJECTION INTRAMUSCULAR; INTRAVENOUS; SUBCUTANEOUS at 21:53

## 2019-10-08 RX ADMIN — Medication 10 UNIT(S): at 12:31

## 2019-10-08 RX ADMIN — POLYETHYLENE GLYCOL 3350 17 GRAM(S): 17 POWDER, FOR SOLUTION ORAL at 11:30

## 2019-10-08 RX ADMIN — Medication 1 MILLIGRAM(S): at 11:30

## 2019-10-08 RX ADMIN — Medication 325 MILLIGRAM(S): at 11:30

## 2019-10-08 RX ADMIN — HEPARIN SODIUM 5000 UNIT(S): 5000 INJECTION INTRAVENOUS; SUBCUTANEOUS at 06:23

## 2019-10-08 RX ADMIN — HEPARIN SODIUM 5000 UNIT(S): 5000 INJECTION INTRAVENOUS; SUBCUTANEOUS at 21:55

## 2019-10-08 RX ADMIN — Medication 325 MILLIGRAM(S): at 11:29

## 2019-10-08 RX ADMIN — Medication 2 MILLIGRAM(S): at 13:45

## 2019-10-08 RX ADMIN — PANTOPRAZOLE SODIUM 40 MILLIGRAM(S): 20 TABLET, DELAYED RELEASE ORAL at 06:23

## 2019-10-08 RX ADMIN — CARBIDOPA AND LEVODOPA 2 TABLET(S): 25; 100 TABLET ORAL at 13:45

## 2019-10-08 RX ADMIN — CARBIDOPA AND LEVODOPA 2 TABLET(S): 25; 100 TABLET ORAL at 06:24

## 2019-10-08 RX ADMIN — AMIODARONE HYDROCHLORIDE 200 MILLIGRAM(S): 400 TABLET ORAL at 06:24

## 2019-10-08 RX ADMIN — Medication 100 MILLIGRAM(S): at 13:45

## 2019-10-08 RX ADMIN — Medication 2 MILLIGRAM(S): at 06:25

## 2019-10-08 RX ADMIN — SODIUM CHLORIDE 3 MILLILITER(S): 9 INJECTION INTRAMUSCULAR; INTRAVENOUS; SUBCUTANEOUS at 06:26

## 2019-10-08 RX ADMIN — SODIUM CHLORIDE 3 MILLILITER(S): 9 INJECTION INTRAMUSCULAR; INTRAVENOUS; SUBCUTANEOUS at 13:20

## 2019-10-08 RX ADMIN — Medication 25 MILLIGRAM(S): at 21:56

## 2019-10-08 RX ADMIN — Medication 100 MILLIGRAM(S): at 06:23

## 2019-10-08 RX ADMIN — HEPARIN SODIUM 5000 UNIT(S): 5000 INJECTION INTRAVENOUS; SUBCUTANEOUS at 13:45

## 2019-10-08 RX ADMIN — Medication 10 UNIT(S): at 08:45

## 2019-10-08 NOTE — PROGRESS NOTE ADULT - SUBJECTIVE AND OBJECTIVE BOX
VITAL SIGNS-Telemetry:  SR 58-(50-70)  Vital Signs Last 24 Hrs  T(C): 36.7 (10-08-19 @ 06:00), Max: 36.7 (10-07-19 @ 14:27)  T(F): 98 (10-08-19 @ 06:00), Max: 98.1 (10-07-19 @ 14:27)  HR: 60 (10-08-19 @ 06:00) (59 - 67)  BP: 139/85 (10-08-19 @ 06:00) (102/63 - 139/85)  RR: 18 (10-08-19 @ 06:00) (12 - 19)  SpO2: 96% (10-08-19 @ 06:00) (94% - 97%)         10-07 @ 07:01  -  10-08 @ 07:00  --------------------------------------------------------  IN: 520 mL / OUT: 2050 mL / NET: -1530 mL    Daily     Daily Weight in k.3 (08 Oct 2019 07:07)    CAPILLARY BLOOD GLUCOSE  POCT Blood Glucose.: 119 mg/dL (07 Oct 2019 21:57)  POCT Blood Glucose.: 97 mg/dL (07 Oct 2019 18:33)  POCT Blood Glucose.: 80 mg/dL (07 Oct 2019 17:49)  POCT Blood Glucose.: 70 mg/dL (07 Oct 2019 17:13)  POCT Blood Glucose.: 104 mg/dL (07 Oct 2019 13:53)      PHYSICAL EXAM:  Neurology: alert and oriented x 3, nonfocal, no gross deficits  CV : S1S2  Sternal Wound :  CDI , Stable  Lungs: CTA  Abdomen: soft, nontender, nondistended, positive bowel sounds, last bowel movement    pre-op     Extremities:     b/l ace wraps in place, +edema, inc cdi- no calf tenderness    acetaminophen   Tablet .. 325 milliGRAM(s) Oral once  amiodarone    Tablet 200 milliGRAM(s) Oral daily  aspirin enteric coated 325 milliGRAM(s) Oral daily  atorvastatin 80 milliGRAM(s) Oral at bedtime  carbidopa/levodopa  25/100 2 Tablet(s) Oral three times a day  chlorhexidine 2% Cloths 1 Application(s) Topical <User Schedule>  dextrose 40% Gel 15 Gram(s) Oral once PRN  dextrose 5%. 1000 milliLiter(s) IV Continuous <Continuous>  dextrose 50% Injectable 12.5 Gram(s) IV Push once  dextrose 50% Injectable 25 Gram(s) IV Push once  dextrose 50% Injectable 25 Gram(s) IV Push once  dextrose 50% Injectable 50 milliLiter(s) IV Push every 15 minutes  docusate sodium 100 milliGRAM(s) Oral three times a day  epoetin mari Injectable 4000 Unit(s) SubCutaneous every 7 days  ferrous    sulfate 325 milliGRAM(s) Oral daily  folic acid 1 milliGRAM(s) Oral daily  glucagon  Injectable 1 milliGRAM(s) IntraMuscular once PRN  heparin  Injectable 5000 Unit(s) SubCutaneous every 8 hours  insulin glargine Injectable (LANTUS) 28 Unit(s) SubCutaneous at bedtime  insulin lispro (HumaLOG) corrective regimen sliding scale   SubCutaneous three times a day before meals  insulin lispro (HumaLOG) corrective regimen sliding scale   SubCutaneous at bedtime  insulin lispro Injectable (HumaLOG) 10 Unit(s) SubCutaneous three times a day before meals  levothyroxine 50 MICROGram(s) Oral daily  metoprolol tartrate 25 milliGRAM(s) Oral every 8 hours  oxyCODONE    5 mG/acetaminophen 325 mG 1 Tablet(s) Oral every 6 hours PRN  oxyCODONE    5 mG/acetaminophen 325 mG 2 Tablet(s) Oral every 6 hours PRN  pantoprazole    Tablet 40 milliGRAM(s) Oral before breakfast  sodium chloride 0.9% lock flush 3 milliLiter(s) IV Push every 8 hours  torsemide 10 milliGRAM(s) Oral daily  trihexyphenidyl 2 milliGRAM(s) Oral three times a day    Physical Therapy Rec:   Home  [  ]   Home w/ PT  [  ]  Rehab  [ x ]  Discussed with Cardiothoracic Team at AM rounds.

## 2019-10-08 NOTE — PROGRESS NOTE ADULT - SUBJECTIVE AND OBJECTIVE BOX
Chief complaint  Patient is a 69y old  Male who presents with a chief complaint of Multivessel disease (08 Oct 2019 09:26)   Review of systems  Patient in bed, looks comfortable, no fever, no hypoglycemia.    Labs and Fingersticks  CAPILLARY BLOOD GLUCOSE      POCT Blood Glucose.: 151 mg/dL (08 Oct 2019 08:40)  POCT Blood Glucose.: 119 mg/dL (07 Oct 2019 21:57)  POCT Blood Glucose.: 97 mg/dL (07 Oct 2019 18:33)  POCT Blood Glucose.: 80 mg/dL (07 Oct 2019 17:49)  POCT Blood Glucose.: 70 mg/dL (07 Oct 2019 17:13)  POCT Blood Glucose.: 104 mg/dL (07 Oct 2019 13:53)      Anion Gap, Serum: 12 (10-08 @ 07:14)  Anion Gap, Serum: 14 (10-07 @ 00:44)      Calcium, Total Serum: 8.8 (10-08 @ 07:14)  Calcium, Total Serum: 8.7 (10-07 @ 00:44)  Albumin, Serum: 2.8 <L> (10-07 @ 00:44)    Alanine Aminotransferase (ALT/SGPT): <5 <L> (10-07 @ 00:44)  Alkaline Phosphatase, Serum: 58 (10-07 @ 00:44)  Aspartate Aminotransferase (AST/SGOT): 21 (10-07 @ 00:44)        10-08    132<L>  |  95<L>  |  60<H>  ----------------------------<  129<H>  4.0   |  25  |  3.44<H>    Ca    8.8      08 Oct 2019 07:14  Phos  3.2     10-07  Mg     2.3     10-07    TPro  6.2  /  Alb  2.8<L>  /  TBili  0.4  /  DBili  x   /  AST  21  /  ALT  <5<L>  /  AlkPhos  58  10-07                        9.1    9.72  )-----------( 296      ( 08 Oct 2019 07:28 )             27.6     Medications  MEDICATIONS  (STANDING):  acetaminophen   Tablet .. 325 milliGRAM(s) Oral once  amiodarone    Tablet 200 milliGRAM(s) Oral daily  aspirin enteric coated 325 milliGRAM(s) Oral daily  atorvastatin 80 milliGRAM(s) Oral at bedtime  carbidopa/levodopa  25/100 2 Tablet(s) Oral three times a day  chlorhexidine 2% Cloths 1 Application(s) Topical <User Schedule>  dextrose 5%. 1000 milliLiter(s) (50 mL/Hr) IV Continuous <Continuous>  dextrose 50% Injectable 12.5 Gram(s) IV Push once  dextrose 50% Injectable 25 Gram(s) IV Push once  dextrose 50% Injectable 25 Gram(s) IV Push once  dextrose 50% Injectable 50 milliLiter(s) IV Push every 15 minutes  docusate sodium 100 milliGRAM(s) Oral three times a day  epoetin mari Injectable 4000 Unit(s) SubCutaneous every 7 days  ferrous    sulfate 325 milliGRAM(s) Oral daily  folic acid 1 milliGRAM(s) Oral daily  heparin  Injectable 5000 Unit(s) SubCutaneous every 8 hours  insulin glargine Injectable (LANTUS) 28 Unit(s) SubCutaneous at bedtime  insulin lispro (HumaLOG) corrective regimen sliding scale   SubCutaneous three times a day before meals  insulin lispro (HumaLOG) corrective regimen sliding scale   SubCutaneous at bedtime  insulin lispro Injectable (HumaLOG) 10 Unit(s) SubCutaneous three times a day before meals  levothyroxine 50 MICROGram(s) Oral daily  metolazone 5 milliGRAM(s) Oral once  metoprolol tartrate 25 milliGRAM(s) Oral every 8 hours  pantoprazole    Tablet 40 milliGRAM(s) Oral before breakfast  polyethylene glycol 3350 17 Gram(s) Oral daily  sodium chloride 0.9% lock flush 3 milliLiter(s) IV Push every 8 hours  torsemide 10 milliGRAM(s) Oral daily  trihexyphenidyl 2 milliGRAM(s) Oral three times a day      Physical Exam  General: Patient comfortable in bed  Vital Signs Last 12 Hrs  T(F): 98.6 (10-08-19 @ 12:05), Max: 98.6 (10-08-19 @ 12:05)  HR: 64 (10-08-19 @ 12:05) (60 - 64)  BP: 124/71 (10-08-19 @ 12:05) (124/71 - 139/85)  BP(mean): --  RR: 18 (10-08-19 @ 12:05) (18 - 18)  SpO2: 93% (10-08-19 @ 12:05) (93% - 96%)  Neck: No palpable thyroid nodules.  CVS: S1S2, No murmurs  Respiratory: No wheezing, no crepitations  GI: Abdomen soft, bowel sounds positive  Musculoskeletal:  edema lower extremities.   Skin: No skin rashes, no ecchymosis    Diagnostics    Free Thyroxine, Serum: AM Sched. Collection: 30-Sep-2019 06:00 (09-29 @ 10:47) Chief complaint  Patient is a 69y old  Male who presents with a chief complaint of Multivessel disease (08 Oct 2019 09:26)   Review of systems  Patient in bed, looks comfortable, no fever,  no hypoglycemia.    Labs and Fingersticks  CAPILLARY BLOOD GLUCOSE      POCT Blood Glucose.: 151 mg/dL (08 Oct 2019 08:40)  POCT Blood Glucose.: 119 mg/dL (07 Oct 2019 21:57)  POCT Blood Glucose.: 97 mg/dL (07 Oct 2019 18:33)  POCT Blood Glucose.: 80 mg/dL (07 Oct 2019 17:49)  POCT Blood Glucose.: 70 mg/dL (07 Oct 2019 17:13)  POCT Blood Glucose.: 104 mg/dL (07 Oct 2019 13:53)      Anion Gap, Serum: 12 (10-08 @ 07:14)  Anion Gap, Serum: 14 (10-07 @ 00:44)      Calcium, Total Serum: 8.8 (10-08 @ 07:14)  Calcium, Total Serum: 8.7 (10-07 @ 00:44)  Albumin, Serum: 2.8 <L> (10-07 @ 00:44)    Alanine Aminotransferase (ALT/SGPT): <5 <L> (10-07 @ 00:44)  Alkaline Phosphatase, Serum: 58 (10-07 @ 00:44)  Aspartate Aminotransferase (AST/SGOT): 21 (10-07 @ 00:44)        10-08    132<L>  |  95<L>  |  60<H>  ----------------------------<  129<H>  4.0   |  25  |  3.44<H>    Ca    8.8      08 Oct 2019 07:14  Phos  3.2     10-07  Mg     2.3     10-07    TPro  6.2  /  Alb  2.8<L>  /  TBili  0.4  /  DBili  x   /  AST  21  /  ALT  <5<L>  /  AlkPhos  58  10-07                        9.1    9.72  )-----------( 296      ( 08 Oct 2019 07:28 )             27.6     Medications  MEDICATIONS  (STANDING):  acetaminophen   Tablet .. 325 milliGRAM(s) Oral once  amiodarone    Tablet 200 milliGRAM(s) Oral daily  aspirin enteric coated 325 milliGRAM(s) Oral daily  atorvastatin 80 milliGRAM(s) Oral at bedtime  carbidopa/levodopa  25/100 2 Tablet(s) Oral three times a day  chlorhexidine 2% Cloths 1 Application(s) Topical <User Schedule>  dextrose 5%. 1000 milliLiter(s) (50 mL/Hr) IV Continuous <Continuous>  dextrose 50% Injectable 12.5 Gram(s) IV Push once  dextrose 50% Injectable 25 Gram(s) IV Push once  dextrose 50% Injectable 25 Gram(s) IV Push once  dextrose 50% Injectable 50 milliLiter(s) IV Push every 15 minutes  docusate sodium 100 milliGRAM(s) Oral three times a day  epoetin mari Injectable 4000 Unit(s) SubCutaneous every 7 days  ferrous    sulfate 325 milliGRAM(s) Oral daily  folic acid 1 milliGRAM(s) Oral daily  heparin  Injectable 5000 Unit(s) SubCutaneous every 8 hours  insulin glargine Injectable (LANTUS) 28 Unit(s) SubCutaneous at bedtime  insulin lispro (HumaLOG) corrective regimen sliding scale   SubCutaneous three times a day before meals  insulin lispro (HumaLOG) corrective regimen sliding scale   SubCutaneous at bedtime  insulin lispro Injectable (HumaLOG) 10 Unit(s) SubCutaneous three times a day before meals  levothyroxine 50 MICROGram(s) Oral daily  metolazone 5 milliGRAM(s) Oral once  metoprolol tartrate 25 milliGRAM(s) Oral every 8 hours  pantoprazole    Tablet 40 milliGRAM(s) Oral before breakfast  polyethylene glycol 3350 17 Gram(s) Oral daily  sodium chloride 0.9% lock flush 3 milliLiter(s) IV Push every 8 hours  torsemide 10 milliGRAM(s) Oral daily  trihexyphenidyl 2 milliGRAM(s) Oral three times a day      Physical Exam  General: Patient comfortable in bed  Vital Signs Last 12 Hrs  T(F): 98.6 (10-08-19 @ 12:05), Max: 98.6 (10-08-19 @ 12:05)  HR: 64 (10-08-19 @ 12:05) (60 - 64)  BP: 124/71 (10-08-19 @ 12:05) (124/71 - 139/85)  BP(mean): --  RR: 18 (10-08-19 @ 12:05) (18 - 18)  SpO2: 93% (10-08-19 @ 12:05) (93% - 96%)  Neck: No palpable thyroid nodules.  CVS: S1S2, No murmurs  Respiratory: No wheezing, no crepitations  GI: Abdomen soft, bowel sounds positive  Musculoskeletal:  edema lower extremities.   Skin: No skin rashes, no ecchymosis    Diagnostics    Free Thyroxine, Serum: AM Sched. Collection: 30-Sep-2019 06:00 (09-29 @ 10:47)

## 2019-10-08 NOTE — PROGRESS NOTE ADULT - ASSESSMENT
70 yo M PMHx CAD s/p stent x 1 (2010), T2DM, HTN, Parkinson's presenting with c/o 10/10 chest pain described as burning radiating from abdomen to midsternum x 1 day. Patient reports symptoms started at 2 pm and resolved after 1 hour. Chest pain began after he ate lunch and was walking up a flight of stairs. He reports chest pain was associated with dyspnea, diaphoresis, and nausea. At baseline, he ambulates with cane/walker. Takes medications daily including aspirin and prasugrel. On ROS, reports chronic left leg swelling x 1-2 years. Pt s/p cardiac cath at [facility] found to have multivessel disease requiring transfer to [facility] CT surgery service.   9/26 Nephrology consulted, aldactone discontinued. Pre op Transthoracic Echocardiogram Mitral annular calcification, otherwise normal mitral  valve. Calcified trileaflet aortic valve with normal opening. Mild aortic regurgitation. Mild concentric left ventricular hypertrophy. Endocardium not well visualized; grossly normal left ventricular systolic function.  9/27 VVS; Pre op Cardiac Surgery work up in progress. PT evaluation to assess pre op baseline activity level.  Plan for cardiac surgery in next week Wednesday with Dr. Guillory.  On 10/2/19 C5L LIMA to LAD, SVG to RPDA, SVG to RPL, SVG to Ramus, SVG to OM.   Post op Course:   Extubated on POD # 1  Pressor support required à weaned off   (+) dizziness à Neurology following à Unclear of etiology   CKD à Renal following; on Torsemide 10 mg PO daily    Hyperglycemia / Hypothyroid à Endo following. Continue on Synthroid 50 mcg PO daily.   10/7 Transferred to 2 [facility].   10/8 VSS - SR 58 - pt offers no complaints- alex patent, d/c plan to rehab

## 2019-10-08 NOTE — PROGRESS NOTE ADULT - SUBJECTIVE AND OBJECTIVE BOX
S: Reports nausea today after taking medications. Denies chest pain, SOB, palpitations or dizziness. Review of systems otherwise (-)      MEDICATIONS  (STANDING):  acetaminophen   Tablet .. 325 milliGRAM(s) Oral once  amiodarone    Tablet 200 milliGRAM(s) Oral daily  aspirin enteric coated 325 milliGRAM(s) Oral daily  atorvastatin 80 milliGRAM(s) Oral at bedtime  carbidopa/levodopa  25/100 2 Tablet(s) Oral three times a day  chlorhexidine 2% Cloths 1 Application(s) Topical <User Schedule>  dextrose 5%. 1000 milliLiter(s) (50 mL/Hr) IV Continuous <Continuous>  dextrose 50% Injectable 12.5 Gram(s) IV Push once  dextrose 50% Injectable 25 Gram(s) IV Push once  dextrose 50% Injectable 25 Gram(s) IV Push once  dextrose 50% Injectable 50 milliLiter(s) IV Push every 15 minutes  docusate sodium 100 milliGRAM(s) Oral three times a day  epoetin mari Injectable 4000 Unit(s) SubCutaneous every 7 days  ferrous    sulfate 325 milliGRAM(s) Oral daily  folic acid 1 milliGRAM(s) Oral daily  heparin  Injectable 5000 Unit(s) SubCutaneous every 8 hours  insulin glargine Injectable (LANTUS) 28 Unit(s) SubCutaneous at bedtime  insulin lispro (HumaLOG) corrective regimen sliding scale   SubCutaneous three times a day before meals  insulin lispro (HumaLOG) corrective regimen sliding scale   SubCutaneous at bedtime  insulin lispro Injectable (HumaLOG) 10 Unit(s) SubCutaneous three times a day before meals  levothyroxine 50 MICROGram(s) Oral daily  metolazone 5 milliGRAM(s) Oral once  metoprolol tartrate 25 milliGRAM(s) Oral every 8 hours  pantoprazole    Tablet 40 milliGRAM(s) Oral before breakfast  polyethylene glycol 3350 17 Gram(s) Oral daily  sodium chloride 0.9% lock flush 3 milliLiter(s) IV Push every 8 hours  torsemide 10 milliGRAM(s) Oral daily  trihexyphenidyl 2 milliGRAM(s) Oral three times a day    MEDICATIONS  (PRN):  dextrose 40% Gel 15 Gram(s) Oral once PRN Blood Glucose LESS THAN 70 milliGRAM(s)/deciliter  glucagon  Injectable 1 milliGRAM(s) IntraMuscular once PRN Glucose LESS THAN 70 milligrams/deciliter  oxyCODONE    5 mG/acetaminophen 325 mG 1 Tablet(s) Oral every 6 hours PRN Moderate Pain (4 - 6)  oxyCODONE    5 mG/acetaminophen 325 mG 2 Tablet(s) Oral every 6 hours PRN Severe Pain (7 - 10)      LABS:                            9.1    9.72  )-----------( 296      ( 08 Oct 2019 07:28 )             27.6     Hemoglobin: 9.1 g/dL (10-08 @ 07:28)  Hemoglobin: 8.8 g/dL (10-07 @ 00:44)  Hemoglobin: 9.6 g/dL (10-06 @ 01:08)  Hemoglobin: 8.7 g/dL (10-05 @ 01:08)  Hemoglobin: 9.5 g/dL (10-04 @ 01:16)    10-08    132<L>  |  95<L>  |  60<H>  ----------------------------<  129<H>  4.0   |  25  |  3.44<H>    Ca    8.8      08 Oct 2019 07:14  Phos  3.2     10-07  Mg     2.3     10-07    TPro  6.2  /  Alb  2.8<L>  /  TBili  0.4  /  DBili  x   /  AST  21  /  ALT  <5<L>  /  AlkPhos  58  10-07    Creatinine Trend: 3.44<--, 3.13<--, 3.07<--, 3.07<--, 3.03<--, 2.79<--       10-07-19 @ 07:01  -  10-08-19 @ 07:00  --------------------------------------------------------  IN: 520 mL / OUT: 2050 mL / NET: -1530 mL    10-08-19 @ 07:01  -  10-08-19 @ 13:53  --------------------------------------------------------  IN: 360 mL / OUT: 500 mL / NET: -140 mL        PHYSICAL EXAM  Vital Signs Last 24 Hrs  T(C): 37 (08 Oct 2019 12:05), Max: 37 (08 Oct 2019 12:05)  T(F): 98.6 (08 Oct 2019 12:05), Max: 98.6 (08 Oct 2019 12:05)  HR: 64 (08 Oct 2019 12:05) (59 - 64)  BP: 124/71 (08 Oct 2019 12:05) (102/63 - 139/85)  BP(mean): --  RR: 18 (08 Oct 2019 12:05) (16 - 19)  SpO2: 93% (08 Oct 2019 12:05) (93% - 97%)      Gen: Appears well in NAD  HEENT:  (-)icterus (-)pallor  CV: N S1 S2 1/6 JENNIFER (+)2 Pulses B/l  Resp:  Clear to auscultation B/L, normal effort  GI: (+) BS Soft, NT, ND  Lymph:  (+) B/L LE Edema, (-)obvious lymphadenopathy  Skin: Warm to touch, Normal turgor  Psych: Appropriate mood and affect      TELEMETRY: SB/SR 50s-60s        DIAGNOSTIC TESTING:  [ ] Echocardiogram: < from: Intra-Operative Transesophageal Echo (10.02.19 @ 13:30) >  Conclusions:  1. Mild segmental left ventricular systolic dysfunction.  Apicl hypokinesis  2. Moderate diastolic dysfunction (Stage II).  Confirmed on  10/2/2019 - 14:16:07 by Daniel Duran M.D.  ------------------------------------------------------------------------    < end of copied text >      ASSESSMENT/PLAN:   68 yo M PMHx CAD s/p stent x 1 (2010), T2DM, HTN, Parkinson's, history of meningioma, admitted with NSTEMI, TTE with mild segmental LV dysfxn, cath with multivessel CAD, s/p C5L LIMA-LAD, SVG-RPDA, SVG-RPL, SVG-Ramus, SVG-OM 10/2    - Monitor tele - remains sinus  - Continue medical management ASA, statin   - Agree with short term amio for post-op Afib  - A/C per CTS   - GI / DVT prophylaxis.  - keep K>4, mag >2.0   - Continue BB as tolerated by BP   - Renal f/u - trend creatinine, johnson management  - Supportive care per CTS    Yunier Quiroz PA-C  [facility]  Pager: [phone]

## 2019-10-08 NOTE — PROGRESS NOTE ADULT - ASSESSMENT
68 yo M PMHx CAD s/p stent x 1 (2010), T2DM, HTN, Parkinson's and CKD stage 3b presents with chest pain. S/P cath with MVD- POD #6  CABG  Subnephrotic range proteinuria     1. Renal - CTD6h-0.  Trend BMP and urine output- ABBY is worse     2. CVS   BP is stable     3.. Hyponatremia- hypervolemic     4. Anemia s/p Epogen 10K x 1 10/5    Recommendations  - Trend renal profile and I agree with torsemide/  No need to stop   - Fluid restriction 1.2 L per day   - Creatinine should start to improve soon 68 yo M PMHx CAD s/p stent x 1 (2010), T2DM, HTN, Parkinson's and CKD stage 3b presents with chest pain. S/P cath with MVD- POD #6  CABG  Subnephrotic range proteinuria     1. Renal - GTF2z-5.  Trend BMP and urine output- ABBY is worse     2. CVS   BP is stable     3.. Hyponatremia- hypervolemic     4. Anemia s/p Epogen 10K x 1 10/5    Recommendations  - Trend renal profile and I agree with torsemide/  No need to stop;  Add zaroxolyn for synergy    - Fluid restriction 1.2 L per day   - Creatinine should start to improve soon 68 yo M PMHx CAD s/p stent x 1 (2010), T2DM, HTN, Parkinson's and CKD stage 3b presents with chest pain. S/P cath with MVD- POD #6  CABG  Subnephrotic range proteinuria     1. Renal - VDM9g-6.  Trend BMP and urine output- ABBY is worse     2. CVS   BP is stable     3.. Hyponatremia- hypervolemic     4. Anemia s/p Epogen 10K x 1 10/5    Recommendations  - Trend renal profile and I agree with torsemide/  No need to stop;  Add zaroxolyn for synergy    - Fluid restriction 1.2 L per day   - Creatinine should start to improve soon    -NAIMA johnson in am     Eventual subacute rehab

## 2019-10-08 NOTE — PROGRESS NOTE ADULT - SUBJECTIVE AND OBJECTIVE BOX
[facility]      Seen earlier today, and examined.  - Today, patient is without complaints.           *****MEDICATIONS: Current medication reviewed and documented.    MEDICATIONS  (STANDING):  acetaminophen   Tablet .. 325 milliGRAM(s) Oral once  amiodarone    Tablet 200 milliGRAM(s) Oral daily  aspirin enteric coated 325 milliGRAM(s) Oral daily  atorvastatin 80 milliGRAM(s) Oral at bedtime  bisacodyl Suppository 10 milliGRAM(s) Rectal once  carbidopa/levodopa  25/100 2 Tablet(s) Oral three times a day  chlorhexidine 2% Cloths 1 Application(s) Topical <User Schedule>  dextrose 5%. 1000 milliLiter(s) (50 mL/Hr) IV Continuous <Continuous>  dextrose 50% Injectable 12.5 Gram(s) IV Push once  dextrose 50% Injectable 25 Gram(s) IV Push once  dextrose 50% Injectable 25 Gram(s) IV Push once  dextrose 50% Injectable 50 milliLiter(s) IV Push every 15 minutes  docusate sodium 100 milliGRAM(s) Oral three times a day  epoetin mari Injectable 4000 Unit(s) SubCutaneous every 7 days  ferrous    sulfate 325 milliGRAM(s) Oral daily  folic acid 1 milliGRAM(s) Oral daily  heparin  Injectable 5000 Unit(s) SubCutaneous every 8 hours  insulin glargine Injectable (LANTUS) 28 Unit(s) SubCutaneous at bedtime  insulin lispro (HumaLOG) corrective regimen sliding scale   SubCutaneous three times a day before meals  insulin lispro (HumaLOG) corrective regimen sliding scale   SubCutaneous at bedtime  insulin lispro Injectable (HumaLOG) 10 Unit(s) SubCutaneous three times a day before meals  levothyroxine 50 MICROGram(s) Oral daily  metoprolol tartrate 25 milliGRAM(s) Oral every 8 hours  pantoprazole    Tablet 40 milliGRAM(s) Oral before breakfast  polyethylene glycol 3350 17 Gram(s) Oral daily  sodium chloride 0.9% lock flush 3 milliLiter(s) IV Push every 8 hours  torsemide 10 milliGRAM(s) Oral daily  trihexyphenidyl 2 milliGRAM(s) Oral three times a day    MEDICATIONS  (PRN):  dextrose 40% Gel 15 Gram(s) Oral once PRN Blood Glucose LESS THAN 70 milliGRAM(s)/deciliter  glucagon  Injectable 1 milliGRAM(s) IntraMuscular once PRN Glucose LESS THAN 70 milligrams/deciliter  magnesium citrate Oral Solution 1 Bottle Oral once PRN Constipation  oxyCODONE    5 mG/acetaminophen 325 mG 1 Tablet(s) Oral every 6 hours PRN Moderate Pain (4 - 6)  oxyCODONE    5 mG/acetaminophen 325 mG 2 Tablet(s) Oral every 6 hours PRN Severe Pain (7 - 10)          ***** VITAL SIGNS:  T(F): 97.9 (10-09-19 @ 04:21), Max: 97.9 (10-09-19 @ 04:21)  HR: 59 (10-09-19 @ 04:21) (59 - 65)  BP: 136/75 (10-09-19 @ 04:21) (120/70 - 136/75)  RR: 18 (10-09-19 @ 04:21) (18 - 18)  SpO2: 95% (10-09-19 @ 04:21) (95% - 97%)  Wt(kg): --  ,   I&O's Summary    08 Oct 2019 07:  -  09 Oct 2019 07:00  --------------------------------------------------------  IN: 820 mL / OUT: 2205 mL / NET: -1385 mL    09 Oct 2019 07:  -  09 Oct 2019 14:26  --------------------------------------------------------  IN: 320 mL / OUT: 2450 mL / NET: -2130 mL             *****PHYSICAL EXAM:   alert oriented x 3 attention comprehension are fair.  Able to name, repeat.   EOmi fundi not visualized   no nystagmus VFF to confrontation  Tongue is midline  Palate elevates symmetrically   Moving all 4 ext spontaneously no drift appreciated  left sided tremor            *****LAB AND IMAGIN.5    11.02 )-----------( 314      ( 09 Oct 2019 05:50 )             28.7               10-09    134<L>  |  95<L>  |  56<H>  ----------------------------<  106<H>  4.0   |  26  |  3.32<H>    Ca    8.8      09 Oct 2019 05:50                           [All pertinent recent Imaging/Reports reviewed]           *****A S S E S S M E N T   A N D   P L A N :      68 yo M PMHx CAD s/p stent x 1 (), T2DM, HTN, Parkinson's presenting with c/o 10/10 chest pain described as burning radiating from abdomen to midsternum x 1 day. Patient reports symptoms started at 2 pm and resolved after 1 hour. Chest pain began after he ate lunch and was walking up a flight of stairs. He reports chest pain was associated with dyspnea, diaphoresis, and nausea. At baseline, he ambulates with cane/walker. Takes medications daily including aspirin and prasugrel. On ROS, reports chronic left leg swelling x 1-2 years.        Problem/Recommendations 1: Dizziness of unclear etiology   s/p cabg,    doing well ambulating oob to chair  continue asa/atorvastatin for secondary prophy  Problem/Recommendations 2: Parkinson's stable exam  continue current regime      Thank you for allowing me to participate in the care of this patient. Please do not hesitate to call me if you have any  questions.        ________________  Lily Fang MD  [facility]  [phone]      33 minutes spent on total encounter; more than 50 % of the visit was  spent counseling about plan of care, compliance to diet/exercise and medication regimen and or  coordinating care by the attending physician.      It is advised that stroke patients follow up with ZACH Albarran @ [phone] in 1- 2 weeks.   Others please follow up with Dr. Michael Nissenbaum [phone]

## 2019-10-08 NOTE — PROGRESS NOTE ADULT - SUBJECTIVE AND OBJECTIVE BOX
NEPHROLOGY-[facility] [phone]        Patient seen and examined in bed.  He was in good spirits and offered no complaints.  The pain is improving         MEDICATIONS  (STANDING):  acetaminophen   Tablet .. 325 milliGRAM(s) Oral once  amiodarone    Tablet 200 milliGRAM(s) Oral daily  aspirin enteric coated 325 milliGRAM(s) Oral daily  atorvastatin 80 milliGRAM(s) Oral at bedtime  carbidopa/levodopa  25/100 2 Tablet(s) Oral three times a day  chlorhexidine 2% Cloths 1 Application(s) Topical <User Schedule>  dextrose 5%. 1000 milliLiter(s) (50 mL/Hr) IV Continuous <Continuous>  dextrose 50% Injectable 12.5 Gram(s) IV Push once  dextrose 50% Injectable 25 Gram(s) IV Push once  dextrose 50% Injectable 25 Gram(s) IV Push once  dextrose 50% Injectable 50 milliLiter(s) IV Push every 15 minutes  docusate sodium 100 milliGRAM(s) Oral three times a day  epoetin mari Injectable 4000 Unit(s) SubCutaneous every 7 days  ferrous    sulfate 325 milliGRAM(s) Oral daily  folic acid 1 milliGRAM(s) Oral daily  heparin  Injectable 5000 Unit(s) SubCutaneous every 8 hours  insulin glargine Injectable (LANTUS) 28 Unit(s) SubCutaneous at bedtime  insulin lispro (HumaLOG) corrective regimen sliding scale   SubCutaneous three times a day before meals  insulin lispro (HumaLOG) corrective regimen sliding scale   SubCutaneous at bedtime  insulin lispro Injectable (HumaLOG) 10 Unit(s) SubCutaneous three times a day before meals  levothyroxine 50 MICROGram(s) Oral daily  metoprolol tartrate 25 milliGRAM(s) Oral every 8 hours  pantoprazole    Tablet 40 milliGRAM(s) Oral before breakfast  polyethylene glycol 3350 17 Gram(s) Oral daily  sodium chloride 0.9% lock flush 3 milliLiter(s) IV Push every 8 hours  torsemide 10 milliGRAM(s) Oral daily  trihexyphenidyl 2 milliGRAM(s) Oral three times a day      VITAL:  T(C): , Max: 36.7 (10-07-19 @ 14:27)  T(F): , Max: 98.1 (10-07-19 @ 14:27)  HR: 60 (10-08-19 @ 06:00)  BP: 139/85 (10-08-19 @ 06:00)  BP(mean): 87 (10-07-19 @ 12:00)  RR: 18 (10-08-19 @ 06:00)  SpO2: 96% (10-08-19 @ 06:00)  Wt(kg): --    I and O's:    10-07 @ 07:01  -  10-08 @ 07:00  --------------------------------------------------------  IN: 520 mL / OUT: 2050 mL / NET: -1530 mL          PHYSICAL EXAM:    Constitutional: NAD; obese   Neck:  No JVD  Respiratory: CTAB/L  Cardiovascular: S1 and S2  Gastrointestinal: BS+, soft, NT/ND  Extremities: No peripheral edema  Neurological: A/O x 3, no focal deficits  Psychiatric: Normal mood, normal affect  : No Javier  Skin: No rashes  Access: Not applicable    LABS:                        9.1    9.72  )-----------( 296      ( 08 Oct 2019 07:28 )             27.6     10-08    132<L>  |  95<L>  |  60<H>  ----------------------------<  129<H>  4.0   |  25  |  3.44<H>    Ca    8.8      08 Oct 2019 07:14  Phos  3.2     10-07  Mg     2.3     10-07    TPro  6.2  /  Alb  2.8<L>  /  TBili  0.4  /  DBili  x   /  AST  21  /  ALT  <5<L>  /  AlkPhos  58  10-07          Urine Studies:          RADIOLOGY & ADDITIONAL STUDIES: NEPHROLOGY-[facility] [phone]        Patient seen and examined in bed.  He was in good spirits and offered no complaints.  The pain is improving         MEDICATIONS  (STANDING):  acetaminophen   Tablet .. 325 milliGRAM(s) Oral once  amiodarone    Tablet 200 milliGRAM(s) Oral daily  aspirin enteric coated 325 milliGRAM(s) Oral daily  atorvastatin 80 milliGRAM(s) Oral at bedtime  carbidopa/levodopa  25/100 2 Tablet(s) Oral three times a day  chlorhexidine 2% Cloths 1 Application(s) Topical <User Schedule>  dextrose 5%. 1000 milliLiter(s) (50 mL/Hr) IV Continuous <Continuous>  dextrose 50% Injectable 12.5 Gram(s) IV Push once  dextrose 50% Injectable 25 Gram(s) IV Push once  dextrose 50% Injectable 25 Gram(s) IV Push once  dextrose 50% Injectable 50 milliLiter(s) IV Push every 15 minutes  docusate sodium 100 milliGRAM(s) Oral three times a day  epoetin mari Injectable 4000 Unit(s) SubCutaneous every 7 days  ferrous    sulfate 325 milliGRAM(s) Oral daily  folic acid 1 milliGRAM(s) Oral daily  heparin  Injectable 5000 Unit(s) SubCutaneous every 8 hours  insulin glargine Injectable (LANTUS) 28 Unit(s) SubCutaneous at bedtime  insulin lispro (HumaLOG) corrective regimen sliding scale   SubCutaneous three times a day before meals  insulin lispro (HumaLOG) corrective regimen sliding scale   SubCutaneous at bedtime  insulin lispro Injectable (HumaLOG) 10 Unit(s) SubCutaneous three times a day before meals  levothyroxine 50 MICROGram(s) Oral daily  metoprolol tartrate 25 milliGRAM(s) Oral every 8 hours  pantoprazole    Tablet 40 milliGRAM(s) Oral before breakfast  polyethylene glycol 3350 17 Gram(s) Oral daily  sodium chloride 0.9% lock flush 3 milliLiter(s) IV Push every 8 hours  torsemide 10 milliGRAM(s) Oral daily  trihexyphenidyl 2 milliGRAM(s) Oral three times a day      VITAL:  T(C): , Max: 36.7 (10-07-19 @ 14:27)  T(F): , Max: 98.1 (10-07-19 @ 14:27)  HR: 60 (10-08-19 @ 06:00)  BP: 139/85 (10-08-19 @ 06:00)  BP(mean): 87 (10-07-19 @ 12:00)  RR: 18 (10-08-19 @ 06:00)  SpO2: 96% (10-08-19 @ 06:00)  Wt(kg): --    I and O's:    10-07 @ 07:01  -  10-08 @ 07:00  --------------------------------------------------------  IN: 520 mL / OUT: 2050 mL / NET: -1530 mL          PHYSICAL EXAM:    Constitutional: NAD; obese   Neck:  No JVD  Respiratory: CTAB/L  Cardiovascular: S1 and S2  Gastrointestinal: BS+, soft, NT/ND  Extremities: +_ peripheral edema  Neurological: A/O x 3, no focal deficits  Psychiatric: Normal mood, normal affect  : No Javier  Skin: No rashes  Access: Not applicable    LABS:                        9.1    9.72  )-----------( 296      ( 08 Oct 2019 07:28 )             27.6     10-08    132<L>  |  95<L>  |  60<H>  ----------------------------<  129<H>  4.0   |  25  |  3.44<H>    Ca    8.8      08 Oct 2019 07:14  Phos  3.2     10-07  Mg     2.3     10-07    TPro  6.2  /  Alb  2.8<L>  /  TBili  0.4  /  DBili  x   /  AST  21  /  ALT  <5<L>  /  AlkPhos  58  10-07          Urine Studies:          RADIOLOGY & ADDITIONAL STUDIES:

## 2019-10-09 LAB
ANION GAP SERPL CALC-SCNC: 13 MMOL/L — SIGNIFICANT CHANGE UP (ref 5–17)
BUN SERPL-MCNC: 56 MG/DL — HIGH (ref 7–23)
CALCIUM SERPL-MCNC: 8.8 MG/DL — SIGNIFICANT CHANGE UP (ref 8.4–10.5)
CHLORIDE SERPL-SCNC: 95 MMOL/L — LOW (ref 96–108)
CO2 SERPL-SCNC: 26 MMOL/L — SIGNIFICANT CHANGE UP (ref 22–31)
CREAT SERPL-MCNC: 3.32 MG/DL — HIGH (ref 0.5–1.3)
GLUCOSE SERPL-MCNC: 106 MG/DL — HIGH (ref 70–99)
HCT VFR BLD CALC: 28.7 % — LOW (ref 39–50)
HGB BLD-MCNC: 9.5 G/DL — LOW (ref 13–17)
MCHC RBC-ENTMCNC: 30.3 PG — SIGNIFICANT CHANGE UP (ref 27–34)
MCHC RBC-ENTMCNC: 33.1 GM/DL — SIGNIFICANT CHANGE UP (ref 32–36)
MCV RBC AUTO: 91.4 FL — SIGNIFICANT CHANGE UP (ref 80–100)
NRBC # BLD: 0 /100 WBCS — SIGNIFICANT CHANGE UP (ref 0–0)
PLATELET # BLD AUTO: 314 K/UL — SIGNIFICANT CHANGE UP (ref 150–400)
POTASSIUM SERPL-MCNC: 4 MMOL/L — SIGNIFICANT CHANGE UP (ref 3.5–5.3)
POTASSIUM SERPL-SCNC: 4 MMOL/L — SIGNIFICANT CHANGE UP (ref 3.5–5.3)
RBC # BLD: 3.14 M/UL — LOW (ref 4.2–5.8)
RBC # FLD: 14.7 % — HIGH (ref 10.3–14.5)
SODIUM SERPL-SCNC: 134 MMOL/L — LOW (ref 135–145)
WBC # BLD: 11.02 K/UL — HIGH (ref 3.8–10.5)
WBC # FLD AUTO: 11.02 K/UL — HIGH (ref 3.8–10.5)

## 2019-10-09 PROCEDURE — 99222 1ST HOSP IP/OBS MODERATE 55: CPT

## 2019-10-09 RX ADMIN — INSULIN GLARGINE 28 UNIT(S): 100 INJECTION, SOLUTION SUBCUTANEOUS at 22:18

## 2019-10-09 RX ADMIN — Medication 50 MICROGRAM(S): at 06:18

## 2019-10-09 RX ADMIN — SODIUM CHLORIDE 3 MILLILITER(S): 9 INJECTION INTRAMUSCULAR; INTRAVENOUS; SUBCUTANEOUS at 13:38

## 2019-10-09 RX ADMIN — Medication 100 MILLIGRAM(S): at 06:18

## 2019-10-09 RX ADMIN — HEPARIN SODIUM 5000 UNIT(S): 5000 INJECTION INTRAVENOUS; SUBCUTANEOUS at 06:18

## 2019-10-09 RX ADMIN — Medication 10 MILLIGRAM(S): at 06:19

## 2019-10-09 RX ADMIN — Medication 325 MILLIGRAM(S): at 12:36

## 2019-10-09 RX ADMIN — Medication 1 MILLIGRAM(S): at 13:37

## 2019-10-09 RX ADMIN — CARBIDOPA AND LEVODOPA 2 TABLET(S): 25; 100 TABLET ORAL at 22:17

## 2019-10-09 RX ADMIN — CARBIDOPA AND LEVODOPA 2 TABLET(S): 25; 100 TABLET ORAL at 13:36

## 2019-10-09 RX ADMIN — Medication 25 MILLIGRAM(S): at 06:19

## 2019-10-09 RX ADMIN — Medication 100 MILLIGRAM(S): at 13:37

## 2019-10-09 RX ADMIN — OXYCODONE AND ACETAMINOPHEN 2 TABLET(S): 5; 325 TABLET ORAL at 12:36

## 2019-10-09 RX ADMIN — POLYETHYLENE GLYCOL 3350 17 GRAM(S): 17 POWDER, FOR SOLUTION ORAL at 12:41

## 2019-10-09 RX ADMIN — Medication 2 MILLIGRAM(S): at 22:17

## 2019-10-09 RX ADMIN — Medication 25 MILLIGRAM(S): at 13:37

## 2019-10-09 RX ADMIN — CHLORHEXIDINE GLUCONATE 1 APPLICATION(S): 213 SOLUTION TOPICAL at 08:21

## 2019-10-09 RX ADMIN — Medication 25 MILLIGRAM(S): at 22:17

## 2019-10-09 RX ADMIN — Medication 2 MILLIGRAM(S): at 06:18

## 2019-10-09 RX ADMIN — Medication 10 UNIT(S): at 08:23

## 2019-10-09 RX ADMIN — Medication 10 UNIT(S): at 12:41

## 2019-10-09 RX ADMIN — AMIODARONE HYDROCHLORIDE 200 MILLIGRAM(S): 400 TABLET ORAL at 06:18

## 2019-10-09 RX ADMIN — CARBIDOPA AND LEVODOPA 2 TABLET(S): 25; 100 TABLET ORAL at 06:18

## 2019-10-09 RX ADMIN — PANTOPRAZOLE SODIUM 40 MILLIGRAM(S): 20 TABLET, DELAYED RELEASE ORAL at 06:18

## 2019-10-09 RX ADMIN — HEPARIN SODIUM 5000 UNIT(S): 5000 INJECTION INTRAVENOUS; SUBCUTANEOUS at 22:17

## 2019-10-09 RX ADMIN — SODIUM CHLORIDE 3 MILLILITER(S): 9 INJECTION INTRAMUSCULAR; INTRAVENOUS; SUBCUTANEOUS at 22:14

## 2019-10-09 RX ADMIN — SODIUM CHLORIDE 3 MILLILITER(S): 9 INJECTION INTRAMUSCULAR; INTRAVENOUS; SUBCUTANEOUS at 06:22

## 2019-10-09 RX ADMIN — Medication 1 BOTTLE: at 15:29

## 2019-10-09 RX ADMIN — HEPARIN SODIUM 5000 UNIT(S): 5000 INJECTION INTRAVENOUS; SUBCUTANEOUS at 13:37

## 2019-10-09 RX ADMIN — ATORVASTATIN CALCIUM 80 MILLIGRAM(S): 80 TABLET, FILM COATED ORAL at 22:17

## 2019-10-09 RX ADMIN — Medication 2 MILLIGRAM(S): at 13:39

## 2019-10-09 RX ADMIN — Medication 10 UNIT(S): at 17:10

## 2019-10-09 RX ADMIN — Medication 2: at 08:23

## 2019-10-09 RX ADMIN — OXYCODONE AND ACETAMINOPHEN 2 TABLET(S): 5; 325 TABLET ORAL at 13:06

## 2019-10-09 NOTE — CONSULT NOTE ADULT - SUBJECTIVE AND OBJECTIVE BOX
HPI:  70 yo M PMHx CAD s/p stent x 1 (2010), T2DM, HTN, Parkinson's presenting with c/o 10/10 chest pain described as burning radiating from abdomen to midsternum x 1 day. Patient reports symptoms started at 2 pm and resolved after 1 hour. Chest pain began after he ate lunch and was walking up a flight of stairs. He reports chest pain was associated with dyspnea, diaphoresis, and nausea. At baseline, he ambulates with cane/walker. Takes medications daily including aspirin and prasugrel. On ROS, reports chronic left leg swelling x 1-2 years. Pt s/p cardiac cath at [facility] found to have multivessel disease requiring transfer to [facility] CT surgery service. Pt denies any chest pain, palpitations, SOB, N/V/D, fever, syncope, changes in vision, numbness/tingling or recent travel. 9/26 Nephrology consulted, aldactone discontinued. Pre op Transthoracic Echocardiogram Mitral annular calcification, otherwise normal mitral valve. Calcified trileaflet aortic valve with normal opening. Mild aortic regurgitation. Mild concentric left ventricular hypertrophy. Endocardium not well visualized; grossly normal left ventricular systolic function. 9/27 VVS; Pre op Cardiac Surgery work up in progress. PT evaluation to assess pre op baseline activity level.  Plan for cardiac surgery in next week Wednesday with Dr. Guillory. On 10/2/19 C5L LIMA to LAD, SVG to RPDA, SVG to RPL, SVG to Ramus, SVG to OM.   Post op Course: Extubated on POD # 1 Pressor support required à weaned off (+) dizziness à Neurology following à Unclear of etiology CKD à Renal following; on Torsemide 10 mg PO daily  Hyperglycemia / Hypothyroid à Endo following. Continue on Synthroid 50 mcg PO daily.   10/7 Transferred to 2 [facility] floor. 10/8 VSS - SR 58 - pt offers no complaints- alex patent, d/c plan to rehab10/9 HD stable. Pt yet to have BM post-op-given mag citrate this AM. Alex d/c'd today. PM&R consulted for dispo/rehab recommendations.         REVIEW OF SYSTEMS    All other review of systems negative    PAST MEDICAL & SURGICAL HISTORY  Atherosclerosis of native coronary artery with unstable angina pectoris  Family history of pulmonary fibrosis (Sibling)  Family history of malaria  Family history of hypertension  No significant family history  Handoff  MEWS Score  Shoulder pain, left  Osteoarthritis  Panic attacks  Urinary frequency  Urinary incontinence  Nocturia  Gastroesophageal reflux disease without esophagitis  Vertigo  Insomnia  Autoimmune disease  Leg swelling  CAD (coronary artery disease)  Parkinson disease  Hypercholesterolemia  Diabetes Mellitus, Type 2  Hypertension  CAD (coronary artery disease)  CAD (coronary artery disease)  CKD (chronic kidney disease)  S/P CABG x 5  Hypertension  Hypothyroidism  Bradycardia  Diabetes Mellitus, Type 2  Parkinson disease  CAD (coronary artery disease)  CABG, using internal mammary artery  S/P angioplasty with stent  Status Post Cardiac Catheterization  ATHSCL HEART DISEASE OF NATIVE      SOCIAL HISTORY  Smoking - Denied  EtOH - Denied   Drugs - Denied    FUNCTIONAL HISTORY  Lives with son and daughter in law PH 5 VIKI  Independent in ambulation, ADL's, transfers prior to hospitalization    CURRENT FUNCTIONAL STATUS  Bed mobility -   Transfers -   Gait -   ADL's -    FAMILY HISTORY   Reviewed and non-contributory    ALLERGIES  adhesives (Rash)  latex (Urticaria)  No Known Drug Allergies    VITALS  T(C): 36.6 (10-09-19 @ 04:21)  T(F): 97.9 (10-09-19 @ 04:21), Max: 97.9 (10-09-19 @ 04:21)  HR: 59 (10-09-19 @ 04:21) (59 - 65)  BP: 136/75 (10-09-19 @ 04:21) (120/70 - 136/75)  RR:  (18 - 18)  SpO2:  (95% - 97%)  Wt(kg): --    PHYSICAL EXAM  Constitutional - NAD, Comfortable  HEENT - NCAT, EOMI  Neck - Supple  Chest - CTA bilaterally  Cardiovascular - RRR, S1S2  Abdomen - BS+, Soft, NTND  Extremities - No C/C/E, No calf tenderness   Neurologic Exam -                    Cognitive - Awake, Alert, Oriented to self, place, date, year, situation     Communication - Fluent, No dysarthria     Attention - able to recite days of week backwards     Memory - able to recall 3/3 items after 3 minutes     Cranial Nerves - CN 2-12 grossly intact     Motor -                     LEFT    UE - ShAB 5/5, EF 5/5, EE 5/5, WE 5/5,  5/5                    RIGHT UE - ShAB 5/5, EF 5/5, EE 5/5, WE 5/5,  5/5                    LEFT    LE - HF 5/5, KE 5/5, DF 5/5, PF 5/5                    RIGHT LE - HF 5/5, KE 5/5, DF 5/5, PF 5/5        Sensory - Intact to light touch diffusely     Reflexes - DTR intact and symmetrical, Negative Sood's bilaterally, Negative Babinski's bilaterally      Coordination - Finger-to-nose intact bilaterally   Psychiatric - Affect WNL    RECENT LABS/IMAGING                        9.5    11.02 )-----------( 314      ( 09 Oct 2019 05:50 )             28.7     10-09    134<L>  |  95<L>  |  56<H>  ----------------------------<  106<H>  4.0   |  26  |  3.32<H>    Ca    8.8      09 Oct 2019 05:50    MEDICATIONS   MEDICATIONS  (STANDING):  acetaminophen   Tablet .. 325 milliGRAM(s) Oral once  amiodarone    Tablet 200 milliGRAM(s) Oral daily  aspirin enteric coated 325 milliGRAM(s) Oral daily  atorvastatin 80 milliGRAM(s) Oral at bedtime  bisacodyl Suppository 10 milliGRAM(s) Rectal once  carbidopa/levodopa  25/100 2 Tablet(s) Oral three times a day  chlorhexidine 2% Cloths 1 Application(s) Topical <User Schedule>  dextrose 5%. 1000 milliLiter(s) (50 mL/Hr) IV Continuous <Continuous>  dextrose 50% Injectable 12.5 Gram(s) IV Push once  dextrose 50% Injectable 25 Gram(s) IV Push once  dextrose 50% Injectable 25 Gram(s) IV Push once  dextrose 50% Injectable 50 milliLiter(s) IV Push every 15 minutes  docusate sodium 100 milliGRAM(s) Oral three times a day  epoetin mari Injectable 4000 Unit(s) SubCutaneous every 7 days  ferrous    sulfate 325 milliGRAM(s) Oral daily  folic acid 1 milliGRAM(s) Oral daily  heparin  Injectable 5000 Unit(s) SubCutaneous every 8 hours  insulin glargine Injectable (LANTUS) 28 Unit(s) SubCutaneous at bedtime  insulin lispro (HumaLOG) corrective regimen sliding scale   SubCutaneous three times a day before meals  insulin lispro (HumaLOG) corrective regimen sliding scale   SubCutaneous at bedtime  insulin lispro Injectable (HumaLOG) 10 Unit(s) SubCutaneous three times a day before meals  levothyroxine 50 MICROGram(s) Oral daily  metoprolol tartrate 25 milliGRAM(s) Oral every 8 hours  pantoprazole    Tablet 40 milliGRAM(s) Oral before breakfast  polyethylene glycol 3350 17 Gram(s) Oral daily  sodium chloride 0.9% lock flush 3 milliLiter(s) IV Push every 8 hours  torsemide 10 milliGRAM(s) Oral daily  trihexyphenidyl 2 milliGRAM(s) Oral three times a day    MEDICATIONS  (PRN):  dextrose 40% Gel 15 Gram(s) Oral once PRN Blood Glucose LESS THAN 70 milliGRAM(s)/deciliter  glucagon  Injectable 1 milliGRAM(s) IntraMuscular once PRN Glucose LESS THAN 70 milligrams/deciliter  magnesium citrate Oral Solution 1 Bottle Oral once PRN Constipation  oxyCODONE    5 mG/acetaminophen 325 mG 1 Tablet(s) Oral every 6 hours PRN Moderate Pain (4 - 6)  oxyCODONE    5 mG/acetaminophen 325 mG 2 Tablet(s) Oral every 6 hours PRN Severe Pain (7 - 10)      ASSESSMENT/PLAN  68 y/o M with debility-related functional, gait, ADL impairments following CABG     Disposition -   PT - ROM, Bed mobility, Transfers, Ambulation with assistive device  OT - ADLs, ROM  Precautions - Falls, Cardiac  DVT Prophylaxis - SQ Heparin   Weight bearing status - WBAT  Skin - Turn Q2hrs  Diet - DASH, 1200ml fluid restriction HPI:  68 yo M PMHx CAD s/p stent x 1 (2010), T2DM, HTN, Parkinson's presenting with c/o 10/10 chest pain described as burning radiating from abdomen to midsternum x 1 day. Patient reports symptoms started at 2 pm and resolved after 1 hour. Chest pain began after he ate lunch and was walking up a flight of stairs. He reports chest pain was associated with dyspnea, diaphoresis, and nausea. At baseline, he ambulates with cane/walker. Takes medications daily including aspirin and prasugrel. On ROS, reports chronic left leg swelling x 1-2 years. Pt s/p cardiac cath at [facility] found to have multivessel disease requiring transfer to [facility] CT surgery service. Pt denies any chest pain, palpitations, SOB, N/V/D, fever, syncope, changes in vision, numbness/tingling or recent travel. 9/26 Nephrology consulted, aldactone discontinued. Pre op Transthoracic Echocardiogram Mitral annular calcification, otherwise normal mitral valve. Calcified trileaflet aortic valve with normal opening. Mild aortic regurgitation. Mild concentric left ventricular hypertrophy. Endocardium not well visualized; grossly normal left ventricular systolic function. 9/27 VVS; Pre op Cardiac Surgery work up in progress. PT evaluation to assess pre op baseline activity level.  Plan for cardiac surgery in next week Wednesday with Dr. Guillory. On 10/2/19 C5L LIMA to LAD, SVG to RPDA, SVG to RPL, SVG to Ramus, SVG to OM.   Post op Course: Extubated on POD # 1 Pressor support required à weaned off (+) dizziness à Neurology following à Unclear of etiology CKD à Renal following; on Torsemide 10 mg PO daily  Hyperglycemia / Hypothyroid à Endo following. Continue on Synthroid 50 mcg PO daily.   10/7 Transferred to 2 [facility] floor. 10/8 VSS - SR 58 - pt offers no complaints- alex patent, d/c plan to rehab10/9 HD stable. Pt yet to have BM post-op-given mag citrate this AM. Alex d/c'd today. PM&R consulted for dispo/rehab recommendations.     REVIEW OF SYSTEMS  nausea after eating, no vomiting  no bm for several days, passing gas  sternal tenderness  left sided tremor from Parkinson's disease  All other review of systems negative    PAST MEDICAL & SURGICAL HISTORY  Atherosclerosis of native coronary artery with unstable angina pectoris  Family history of pulmonary fibrosis (Sibling)  Family history of malaria  Family history of hypertension  No significant family history  Handoff  MEWS Score  Shoulder pain, left  Osteoarthritis  Panic attacks  Urinary frequency  Urinary incontinence  Nocturia  Gastroesophageal reflux disease without esophagitis  Vertigo  Insomnia  Autoimmune disease  Leg swelling  CAD (coronary artery disease)  Parkinson disease  Hypercholesterolemia  Diabetes Mellitus, Type 2  Hypertension  CAD (coronary artery disease)  CAD (coronary artery disease)  CKD (chronic kidney disease)  S/P CABG x 5  Hypertension  Hypothyroidism  Bradycardia  Diabetes Mellitus, Type 2  Parkinson disease  CAD (coronary artery disease)  CABG, using internal mammary artery  S/P angioplasty with stent  Status Post Cardiac Catheterization  ATHSCL HEART DISEASE OF NATIVE      SOCIAL HISTORY  Smoking - Denied  EtOH - Denied   Drugs - Denied    FUNCTIONAL HISTORY  Lives with son and daughter in law PH 5 VIKI  Independent in ambulation, ADL's, transfers prior to hospitalization    CURRENT FUNCTIONAL STATUS  Bed mobility - mod to max assist  Transfers - mod assist  Gait - mod to max   ADL's - min to mod assist     FAMILY HISTORY   Reviewed and non-contributory    ALLERGIES  adhesives (Rash)  latex (Urticaria)  No Known Drug Allergies    VITALS  T(C): 36.6 (10-09-19 @ 04:21)  T(F): 97.9 (10-09-19 @ 04:21), Max: 97.9 (10-09-19 @ 04:21)  HR: 59 (10-09-19 @ 04:21) (59 - 65)  BP: 136/75 (10-09-19 @ 04:21) (120/70 - 136/75)  RR:  (18 - 18)  SpO2:  (95% - 97%)  Wt(kg): --    PHYSICAL EXAM  Constitutional - NAD, Comfortable  HEENT - NCAT, EOMI  Neck - Supple  Chest - midline sternal incision c/d/i, good respiratory effort   Cardiovascular - RRR, S1S2  Abdomen - BS+, Soft, NTND  Extremities - No C/C/E, No calf tenderness   Neurologic Exam -  resting tremor left hand                 Cognitive - Awake, Alert, Oriented to self, place, date, year, situation     Communication - Fluent, No dysarthria     Cranial Nerves - CN 2-12 grossly intact     Motor -                     LEFT    UE - 4+/5                    RIGHT UE - 4+/5                    LEFT    LE - 4+/5                    RIGHT LE -   4+/5     Sensory - Intact to light touch diffusely     Reflexes - DTR intact and symmetrical    Psychiatric - Affect WNL    RECENT LABS/IMAGING                        9.5    11.02 )-----------( 314      ( 09 Oct 2019 05:50 )             28.7     10-09    134<L>  |  95<L>  |  56<H>  ----------------------------<  106<H>  4.0   |  26  |  3.32<H>    Ca    8.8      09 Oct 2019 05:50    MEDICATIONS   MEDICATIONS  (STANDING):  acetaminophen   Tablet .. 325 milliGRAM(s) Oral once  amiodarone    Tablet 200 milliGRAM(s) Oral daily  aspirin enteric coated 325 milliGRAM(s) Oral daily  atorvastatin 80 milliGRAM(s) Oral at bedtime  bisacodyl Suppository 10 milliGRAM(s) Rectal once  carbidopa/levodopa  25/100 2 Tablet(s) Oral three times a day  chlorhexidine 2% Cloths 1 Application(s) Topical <User Schedule>  dextrose 5%. 1000 milliLiter(s) (50 mL/Hr) IV Continuous <Continuous>  dextrose 50% Injectable 12.5 Gram(s) IV Push once  dextrose 50% Injectable 25 Gram(s) IV Push once  dextrose 50% Injectable 25 Gram(s) IV Push once  dextrose 50% Injectable 50 milliLiter(s) IV Push every 15 minutes  docusate sodium 100 milliGRAM(s) Oral three times a day  epoetin mari Injectable 4000 Unit(s) SubCutaneous every 7 days  ferrous    sulfate 325 milliGRAM(s) Oral daily  folic acid 1 milliGRAM(s) Oral daily  heparin  Injectable 5000 Unit(s) SubCutaneous every 8 hours  insulin glargine Injectable (LANTUS) 28 Unit(s) SubCutaneous at bedtime  insulin lispro (HumaLOG) corrective regimen sliding scale   SubCutaneous three times a day before meals  insulin lispro (HumaLOG) corrective regimen sliding scale   SubCutaneous at bedtime  insulin lispro Injectable (HumaLOG) 10 Unit(s) SubCutaneous three times a day before meals  levothyroxine 50 MICROGram(s) Oral daily  metoprolol tartrate 25 milliGRAM(s) Oral every 8 hours  pantoprazole    Tablet 40 milliGRAM(s) Oral before breakfast  polyethylene glycol 3350 17 Gram(s) Oral daily  sodium chloride 0.9% lock flush 3 milliLiter(s) IV Push every 8 hours  torsemide 10 milliGRAM(s) Oral daily  trihexyphenidyl 2 milliGRAM(s) Oral three times a day    MEDICATIONS  (PRN):  dextrose 40% Gel 15 Gram(s) Oral once PRN Blood Glucose LESS THAN 70 milliGRAM(s)/deciliter  glucagon  Injectable 1 milliGRAM(s) IntraMuscular once PRN Glucose LESS THAN 70 milligrams/deciliter  magnesium citrate Oral Solution 1 Bottle Oral once PRN Constipation  oxyCODONE    5 mG/acetaminophen 325 mG 1 Tablet(s) Oral every 6 hours PRN Moderate Pain (4 - 6)  oxyCODONE    5 mG/acetaminophen 325 mG 2 Tablet(s) Oral every 6 hours PRN Severe Pain (7 - 10)      ASSESSMENT/PLAN  68 y/o M with debility-related functional, gait, ADL impairments following CABG     Disposition - when medically stable RECOMMEND SUBACUTE REHAB to address current functional deficits.    PT - ROM, Bed mobility, Transfers, Ambulation with assistive device  OT - ADLs, ROM  Precautions - Falls, Cardiac  DVT Prophylaxis - SQ Heparin   Weight bearing status - WBAT  Skin - Turn Q2hrs  Diet - DASH, 1200ml fluid restriction

## 2019-10-09 NOTE — PROGRESS NOTE ADULT - SUBJECTIVE AND OBJECTIVE BOX
NEPHROLOGY-[facility] [phone]        Patient seen and examined in bed.  He was walking the halls.          MEDICATIONS  (STANDING):  acetaminophen   Tablet .. 325 milliGRAM(s) Oral once  amiodarone    Tablet 200 milliGRAM(s) Oral daily  aspirin enteric coated 325 milliGRAM(s) Oral daily  atorvastatin 80 milliGRAM(s) Oral at bedtime  bisacodyl Suppository 10 milliGRAM(s) Rectal once  carbidopa/levodopa  25/100 2 Tablet(s) Oral three times a day  chlorhexidine 2% Cloths 1 Application(s) Topical <User Schedule>  dextrose 5%. 1000 milliLiter(s) (50 mL/Hr) IV Continuous <Continuous>  dextrose 50% Injectable 12.5 Gram(s) IV Push once  dextrose 50% Injectable 25 Gram(s) IV Push once  dextrose 50% Injectable 25 Gram(s) IV Push once  dextrose 50% Injectable 50 milliLiter(s) IV Push every 15 minutes  docusate sodium 100 milliGRAM(s) Oral three times a day  epoetin mari Injectable 4000 Unit(s) SubCutaneous every 7 days  ferrous    sulfate 325 milliGRAM(s) Oral daily  folic acid 1 milliGRAM(s) Oral daily  heparin  Injectable 5000 Unit(s) SubCutaneous every 8 hours  insulin glargine Injectable (LANTUS) 28 Unit(s) SubCutaneous at bedtime  insulin lispro (HumaLOG) corrective regimen sliding scale   SubCutaneous three times a day before meals  insulin lispro (HumaLOG) corrective regimen sliding scale   SubCutaneous at bedtime  insulin lispro Injectable (HumaLOG) 10 Unit(s) SubCutaneous three times a day before meals  levothyroxine 50 MICROGram(s) Oral daily  metoprolol tartrate 25 milliGRAM(s) Oral every 8 hours  pantoprazole    Tablet 40 milliGRAM(s) Oral before breakfast  polyethylene glycol 3350 17 Gram(s) Oral daily  sodium chloride 0.9% lock flush 3 milliLiter(s) IV Push every 8 hours  torsemide 10 milliGRAM(s) Oral daily  trihexyphenidyl 2 milliGRAM(s) Oral three times a day      VITAL:  T(C): , Max: 37 (10-08-19 @ 12:05)  T(F): , Max: 98.6 (10-08-19 @ 12:05)  HR: 59 (10-09-19 @ 04:21)  BP: 136/75 (10-09-19 @ 04:21)  BP(mean): --  RR: 18 (10-09-19 @ 04:21)  SpO2: 95% (10-09-19 @ 04:21)  Wt(kg): --    I and O's:    10-08 @ 07:01  -  10-09 @ 07:00  --------------------------------------------------------  IN: 820 mL / OUT: 2205 mL / NET: -1385 mL    10-09 @ 07:01  -  10-09 @ 10:21  --------------------------------------------------------  IN: 0 mL / OUT: 1650 mL / NET: -1650 mL          PHYSICAL EXAM:    Constitutional: NAD  Neck:  No JVD  Respiratory: CTAB/L  Cardiovascular: S1 and S2  Gastrointestinal: BS+, soft, NT/ND  Extremities: +  peripheral edema  Neurological: A/O x 3, no focal deficits  Psychiatric: Normal mood, normal affect  : +  Javier  Skin: No rashes  Access: Not applicable    LABS:                        9.5    11.02 )-----------( 314      ( 09 Oct 2019 05:50 )             28.7     10-09    134<L>  |  95<L>  |  56<H>  ----------------------------<  106<H>  4.0   |  26  |  3.32<H>    Ca    8.8      09 Oct 2019 05:50            Urine Studies:          RADIOLOGY & ADDITIONAL STUDIES:

## 2019-10-09 NOTE — PROGRESS NOTE ADULT - SUBJECTIVE AND OBJECTIVE BOX
Subjective: Pt states "I'm OK." Denies any CP, SOB, N/V and palpitations. No acute events overnight.     VITAL SIGNS  Telemetry: NSR 60  Vital Signs Last 24 Hrs  T(C): 36.6 (10-09-19 @ 04:21), Max: 37 (10-08-19 @ 12:05)  T(F): 97.9 (10-09-19 @ 04:21), Max: 98.6 (10-08-19 @ 12:05)  HR: 59 (10-09-19 @ 04:21) (59 - 65)  BP: 136/75 (10-09-19 @ 04:21) (120/70 - 136/75)  RR: 18 (10-09-19 @ 04:21) (18 - 18)  SpO2: 95% (10-09-19 @ 04:21) (93% - 97%)            10-08 @ 07:01  -  10-09 @ 07:00  --------------------------------------------------------  IN: 820 mL / OUT: 2205 mL / NET: -1385 mL    10-09 @ 07:01  -  10-09 @ 11:14  --------------------------------------------------------  IN: 120 mL / OUT: 2400 mL / NET: -2280 mL    Daily Weight in k.2 (09 Oct 2019 10:21)    CAPILLARY BLOOD GLUCOSE  POCT Blood Glucose.: 160 mg/dL (09 Oct 2019 07:57)  POCT Blood Glucose.: 122 mg/dL (08 Oct 2019 21:20)  POCT Blood Glucose.: 139 mg/dL (08 Oct 2019 17:44)  POCT Blood Glucose.: 109 mg/dL (08 Oct 2019 12:27)        MEDICATIONS  acetaminophen   Tablet .. 325 milliGRAM(s) Oral once  amiodarone    Tablet 200 milliGRAM(s) Oral daily  aspirin enteric coated 325 milliGRAM(s) Oral daily  atorvastatin 80 milliGRAM(s) Oral at bedtime  bisacodyl Suppository 10 milliGRAM(s) Rectal once  carbidopa/levodopa  25/100 2 Tablet(s) Oral three times a day  chlorhexidine 2% Cloths 1 Application(s) Topical <User Schedule>  dextrose 40% Gel 15 Gram(s) Oral once PRN  dextrose 5%. 1000 milliLiter(s) IV Continuous <Continuous>  dextrose 50% Injectable 12.5 Gram(s) IV Push once  dextrose 50% Injectable 25 Gram(s) IV Push once  dextrose 50% Injectable 25 Gram(s) IV Push once  dextrose 50% Injectable 50 milliLiter(s) IV Push every 15 minutes  docusate sodium 100 milliGRAM(s) Oral three times a day  epoetin mari Injectable 4000 Unit(s) SubCutaneous every 7 days  ferrous    sulfate 325 milliGRAM(s) Oral daily  folic acid 1 milliGRAM(s) Oral daily  glucagon  Injectable 1 milliGRAM(s) IntraMuscular once PRN  heparin  Injectable 5000 Unit(s) SubCutaneous every 8 hours  insulin glargine Injectable (LANTUS) 28 Unit(s) SubCutaneous at bedtime  insulin lispro (HumaLOG) corrective regimen sliding scale   SubCutaneous three times a day before meals  insulin lispro (HumaLOG) corrective regimen sliding scale   SubCutaneous at bedtime  insulin lispro Injectable (HumaLOG) 10 Unit(s) SubCutaneous three times a day before meals  levothyroxine 50 MICROGram(s) Oral daily  magnesium citrate Oral Solution 1 Bottle Oral once PRN  metoprolol tartrate 25 milliGRAM(s) Oral every 8 hours  oxyCODONE    5 mG/acetaminophen 325 mG 1 Tablet(s) Oral every 6 hours PRN  oxyCODONE    5 mG/acetaminophen 325 mG 2 Tablet(s) Oral every 6 hours PRN  pantoprazole    Tablet 40 milliGRAM(s) Oral before breakfast  polyethylene glycol 3350 17 Gram(s) Oral daily  sodium chloride 0.9% lock flush 3 milliLiter(s) IV Push every 8 hours  torsemide 10 milliGRAM(s) Oral daily  trihexyphenidyl 2 milliGRAM(s) Oral three times a day    PHYSICAL EXAM  Neurology: A&Ox3, nonfocal, no gross deficits  CV : RRR, +S1S2  Sternal Wound : CDI, FREDIS, Stable sternum  Lungs: Respirations non-labored, CTA B/L  Abdomen: Soft, NT/ND, +BSx4Q, (-)N/V/D,   : Voiding without difficulty, bladder non-distended Javier         [  ]  Extremities: B/L LE edema, negative calf tenderness, +PP , SVG incision      LABS  10-09    134<L>  |  95<L>  |  56<H>  ----------------------------<  106<H>  4.0   |  26  |  3.32<H>    Ca    8.8      09 Oct 2019 05:50                                   9.5    11.02 )-----------( 314      ( 09 Oct 2019 05:50 )             28.7                 PAST MEDICAL & SURGICAL HISTORY:  Shoulder pain, left  Osteoarthritis  Panic attacks  Urinary frequency  Urinary incontinence  Nocturia: x2-3  Gastroesophageal reflux disease without esophagitis: diet controlled  Vertigo: not improved with meclizine in past  Insomnia  Autoimmune disease: started on prednisone on 12/23/15 for &quot;inflammation&quot; but does not know diagnosis  Leg swelling: left leg  CAD (coronary artery disease): s/p 1 stent in   Parkinson disease  Hypercholesterolemia  Diabetes Mellitus, Type 2  Hypertension  S/P angioplasty with stent: 1 stent, RCA  Status Post Cardiac Catheterization       Physical Therapy Rec:   Home  [  ]   Home w/ PT  [  ]  Rehab  [  ]    Discussed with CT Surgery attending. Subjective: Pt states "I'm OK." Denies any CP, SOB, N/V and palpitations. No acute events overnight.     VITAL SIGNS  Telemetry: NSR 60  Vital Signs Last 24 Hrs  T(C): 36.6 (10-09-19 @ 04:21), Max: 37 (10-08-19 @ 12:05)  T(F): 97.9 (10-09-19 @ 04:21), Max: 98.6 (10-08-19 @ 12:05)  HR: 59 (10-09-19 @ 04:21) (59 - 65)  BP: 136/75 (10-09-19 @ 04:21) (120/70 - 136/75)  RR: 18 (10-09-19 @ 04:21) (18 - 18)  SpO2: 95% (10-09-19 @ 04:21) (93% - 97%)            10-08 @ 07:01  -  10-09 @ 07:00  --------------------------------------------------------  IN: 820 mL / OUT: 2205 mL / NET: -1385 mL    10-09 @ 07:01  -  10-09 @ 11:14  --------------------------------------------------------  IN: 120 mL / OUT: 2400 mL / NET: -2280 mL    Daily Weight in k.2 (09 Oct 2019 10:21)    CAPILLARY BLOOD GLUCOSE  POCT Blood Glucose.: 160 mg/dL (09 Oct 2019 07:57)  POCT Blood Glucose.: 122 mg/dL (08 Oct 2019 21:20)  POCT Blood Glucose.: 139 mg/dL (08 Oct 2019 17:44)  POCT Blood Glucose.: 109 mg/dL (08 Oct 2019 12:27)        MEDICATIONS  acetaminophen   Tablet .. 325 milliGRAM(s) Oral once  amiodarone    Tablet 200 milliGRAM(s) Oral daily  aspirin enteric coated 325 milliGRAM(s) Oral daily  atorvastatin 80 milliGRAM(s) Oral at bedtime  bisacodyl Suppository 10 milliGRAM(s) Rectal once  carbidopa/levodopa  25/100 2 Tablet(s) Oral three times a day  chlorhexidine 2% Cloths 1 Application(s) Topical <User Schedule>  dextrose 40% Gel 15 Gram(s) Oral once PRN  dextrose 5%. 1000 milliLiter(s) IV Continuous <Continuous>  dextrose 50% Injectable 12.5 Gram(s) IV Push once  dextrose 50% Injectable 25 Gram(s) IV Push once  dextrose 50% Injectable 25 Gram(s) IV Push once  dextrose 50% Injectable 50 milliLiter(s) IV Push every 15 minutes  docusate sodium 100 milliGRAM(s) Oral three times a day  epoetin mari Injectable 4000 Unit(s) SubCutaneous every 7 days  ferrous    sulfate 325 milliGRAM(s) Oral daily  folic acid 1 milliGRAM(s) Oral daily  glucagon  Injectable 1 milliGRAM(s) IntraMuscular once PRN  heparin  Injectable 5000 Unit(s) SubCutaneous every 8 hours  insulin glargine Injectable (LANTUS) 28 Unit(s) SubCutaneous at bedtime  insulin lispro (HumaLOG) corrective regimen sliding scale   SubCutaneous three times a day before meals  insulin lispro (HumaLOG) corrective regimen sliding scale   SubCutaneous at bedtime  insulin lispro Injectable (HumaLOG) 10 Unit(s) SubCutaneous three times a day before meals  levothyroxine 50 MICROGram(s) Oral daily  magnesium citrate Oral Solution 1 Bottle Oral once PRN  metoprolol tartrate 25 milliGRAM(s) Oral every 8 hours  oxyCODONE    5 mG/acetaminophen 325 mG 1 Tablet(s) Oral every 6 hours PRN  oxyCODONE    5 mG/acetaminophen 325 mG 2 Tablet(s) Oral every 6 hours PRN  pantoprazole    Tablet 40 milliGRAM(s) Oral before breakfast  polyethylene glycol 3350 17 Gram(s) Oral daily  sodium chloride 0.9% lock flush 3 milliLiter(s) IV Push every 8 hours  torsemide 10 milliGRAM(s) Oral daily  trihexyphenidyl 2 milliGRAM(s) Oral three times a day    PHYSICAL EXAM  Neurology: A&Ox3, nonfocal, Parkinson resting tremor   CV : RRR, +S1S2  Sternal Wound : CDI, FREDIS, Stable sternum  Lungs: Respirations non-labored, CTA B/L  Abdomen: Soft, NT/ND, +BSx4Q, (+) flatulence, no BM, (-)N/V/D  : Voiding without difficulty, bladder non-distended, Javier in place  Extremities: 1+ B/L LE edema, negative calf tenderness, +PP , right SVG incision-CDI, FREDIS    LABS  10-09    134<L>  |  95<L>  |  56<H>  ----------------------------<  106<H>  4.0   |  26  |  3.32<H>    Ca    8.8      09 Oct 2019 05:50                             9.5    11.02 )-----------( 314      ( 09 Oct 2019 05:50 )             28.7          PAST MEDICAL & SURGICAL HISTORY:  Shoulder pain, left  Osteoarthritis  Panic attacks  Urinary frequency  Urinary incontinence  Nocturia: x2-3  Gastroesophageal reflux disease without esophagitis: diet controlled  Vertigo: not improved with meclizine in past  Insomnia  Autoimmune disease: started on prednisone on 12/23/15 for &quot;inflammation&quot; but does not know diagnosis  Leg swelling: left leg  CAD (coronary artery disease): s/p 1 stent in   Parkinson disease  Hypercholesterolemia  Diabetes Mellitus, Type 2  Hypertension  S/P angioplasty with stent: 1 stent, RCA  Status Post Cardiac Catheterization     Physical Therapy Rec:   Home  [  ]   Home w/ PT  [  ]  Rehab  [ X ]    Discussed with CT Surgery attending.

## 2019-10-09 NOTE — PROGRESS NOTE ADULT - SUBJECTIVE AND OBJECTIVE BOX
S: +constipation. Denies chest pain, SOB at rest, palpitations or dizziness. Review of systems otherwise (-)      MEDICATIONS  (STANDING):  acetaminophen   Tablet .. 325 milliGRAM(s) Oral once  amiodarone    Tablet 200 milliGRAM(s) Oral daily  aspirin enteric coated 325 milliGRAM(s) Oral daily  atorvastatin 80 milliGRAM(s) Oral at bedtime  bisacodyl Suppository 10 milliGRAM(s) Rectal once  carbidopa/levodopa  25/100 2 Tablet(s) Oral three times a day  chlorhexidine 2% Cloths 1 Application(s) Topical <User Schedule>  dextrose 5%. 1000 milliLiter(s) (50 mL/Hr) IV Continuous <Continuous>  dextrose 50% Injectable 12.5 Gram(s) IV Push once  dextrose 50% Injectable 25 Gram(s) IV Push once  dextrose 50% Injectable 25 Gram(s) IV Push once  dextrose 50% Injectable 50 milliLiter(s) IV Push every 15 minutes  docusate sodium 100 milliGRAM(s) Oral three times a day  epoetin mari Injectable 4000 Unit(s) SubCutaneous every 7 days  ferrous    sulfate 325 milliGRAM(s) Oral daily  folic acid 1 milliGRAM(s) Oral daily  heparin  Injectable 5000 Unit(s) SubCutaneous every 8 hours  insulin glargine Injectable (LANTUS) 28 Unit(s) SubCutaneous at bedtime  insulin lispro (HumaLOG) corrective regimen sliding scale   SubCutaneous three times a day before meals  insulin lispro (HumaLOG) corrective regimen sliding scale   SubCutaneous at bedtime  insulin lispro Injectable (HumaLOG) 10 Unit(s) SubCutaneous three times a day before meals  levothyroxine 50 MICROGram(s) Oral daily  metoprolol tartrate 25 milliGRAM(s) Oral every 8 hours  pantoprazole    Tablet 40 milliGRAM(s) Oral before breakfast  polyethylene glycol 3350 17 Gram(s) Oral daily  sodium chloride 0.9% lock flush 3 milliLiter(s) IV Push every 8 hours  torsemide 10 milliGRAM(s) Oral daily  trihexyphenidyl 2 milliGRAM(s) Oral three times a day    MEDICATIONS  (PRN):  dextrose 40% Gel 15 Gram(s) Oral once PRN Blood Glucose LESS THAN 70 milliGRAM(s)/deciliter  glucagon  Injectable 1 milliGRAM(s) IntraMuscular once PRN Glucose LESS THAN 70 milligrams/deciliter  magnesium citrate Oral Solution 1 Bottle Oral once PRN Constipation  oxyCODONE    5 mG/acetaminophen 325 mG 1 Tablet(s) Oral every 6 hours PRN Moderate Pain (4 - 6)  oxyCODONE    5 mG/acetaminophen 325 mG 2 Tablet(s) Oral every 6 hours PRN Severe Pain (7 - 10)      LABS:                            9.5    11.02 )-----------( 314      ( 09 Oct 2019 05:50 )             28.7     Hemoglobin: 9.5 g/dL (10-09 @ 05:50)  Hemoglobin: 9.1 g/dL (10-08 @ 07:28)  Hemoglobin: 8.8 g/dL (10-07 @ 00:44)  Hemoglobin: 9.6 g/dL (10-06 @ 01:08)  Hemoglobin: 8.7 g/dL (10-05 @ 01:08)    10-09    134<L>  |  95<L>  |  56<H>  ----------------------------<  106<H>  4.0   |  26  |  3.32<H>    Ca    8.8      09 Oct 2019 05:50      Creatinine Trend: 3.32<--, 3.44<--, 3.13<--, 3.07<--, 3.07<--, 3.03<--       10-08-19 @ 07:01  -  10-09-19 @ 07:00  --------------------------------------------------------  IN: 820 mL / OUT: 2205 mL / NET: -1385 mL    10-09-19 @ 07:01  -  10-09-19 @ 10:34  --------------------------------------------------------  IN: 0 mL / OUT: 1650 mL / NET: -1650 mL        PHYSICAL EXAM  Vital Signs Last 24 Hrs  T(C): 36.6 (09 Oct 2019 04:21), Max: 37 (08 Oct 2019 12:05)  T(F): 97.9 (09 Oct 2019 04:21), Max: 98.6 (08 Oct 2019 12:05)  HR: 59 (09 Oct 2019 04:21) (59 - 65)  BP: 136/75 (09 Oct 2019 04:21) (120/70 - 136/75)  BP(mean): --  RR: 18 (09 Oct 2019 04:21) (18 - 18)  SpO2: 95% (09 Oct 2019 04:21) (93% - 97%)      Gen: Appears well in NAD  HEENT:  (-)icterus (-)pallor  CV: N S1 S2 1/6 JENNIFER (+)2 Pulses B/l  Resp:  Clear to auscultation B/L, normal effort  GI: (+) BS Soft, NT, ND  Lymph:  (+) B/L LE Edema, (-)obvious lymphadenopathy  Skin: Warm to touch, Normal turgor  Psych: Appropriate mood and affect      TELEMETRY: SB/SR 50-70s        DIAGNOSTIC TESTING:  [ ] Echocardiogram: < from: Intra-Operative Transesophageal Echo (10.02.19 @ 13:30) >  Conclusions:  1. Mild segmental left ventricular systolic dysfunction.  Apicl hypokinesis  2. Moderate diastolic dysfunction (Stage II).  Confirmed on  10/2/2019 - 14:16:07 by Daniel Duran M.D.  ------------------------------------------------------------------------    < end of copied text >      ASSESSMENT/PLAN:   68 yo M PMHx CAD s/p stent x 1 (2010), T2DM, HTN, Parkinson's, history of meningioma, admitted with NSTEMI, TTE with mild segmental LV dysfxn, cath with multivessel CAD, s/p C5L LIMA-LAD, SVG-RPDA, SVG-RPL, SVG-Ramus, SVG-OM 10/2    - Monitor tele - remains sinus  - Continue medical management of CAD with ASA, statin   - Agree with short term amio for post-op Afib  - A/C per CTS   - GI / DVT prophylaxis.  - keep K>4, mag >2.0   - Tolerating BB  - Renal f/u - trend creatinine, TOV today  - Supportive care per CTS appreciated    Yunier Quiroz PA-C  [facility]  Pager: [phone]

## 2019-10-09 NOTE — PROGRESS NOTE ADULT - ASSESSMENT
70 yo M PMHx CAD s/p stent x 1 (2010), T2DM, HTN, Parkinson's presenting with c/o 10/10 chest pain described as burning radiating from abdomen to midsternum x 1 day. Patient reports symptoms started at 2 pm and resolved after 1 hour. Chest pain began after he ate lunch and was walking up a flight of stairs. He reports chest pain was associated with dyspnea, diaphoresis, and nausea. At baseline, he ambulates with cane/walker. Takes medications daily including aspirin and prasugrel. On ROS, reports chronic left leg swelling x 1-2 years. Pt s/p cardiac cath at [facility] found to have multivessel disease requiring transfer to [facility] CT surgery service.   9/26 Nephrology consulted, aldactone discontinued. Pre op Transthoracic Echocardiogram Mitral annular calcification, otherwise normal mitral  valve. Calcified trileaflet aortic valve with normal opening. Mild aortic regurgitation. Mild concentric left ventricular hypertrophy. Endocardium not well visualized; grossly normal left ventricular systolic function.  9/27 VVS; Pre op Cardiac Surgery work up in progress. PT evaluation to assess pre op baseline activity level.  Plan for cardiac surgery in next week Wednesday with Dr. Guillory.  On 10/2/19 C5L LIMA to LAD, SVG to RPDA, SVG to RPL, SVG to Ramus, SVG to OM.   Post op Course:   Extubated on POD # 1  Pressor support required à weaned off   (+) dizziness à Neurology following à Unclear of etiology   CKD à Renal following; on Torsemide 10 mg PO daily    Hyperglycemia / Hypothyroid à Endo following. Continue on Synthroid 50 mcg PO daily.   10/7 Transferred to 2 [facility].   10/8 VSS - SR 58 - pt offers no complaints- alex patent, d/c plan to rehab  10/9 HD stable. Pt yet to have BM post-op-given mag citrate this AM.   Alex d/c'd today.   Discharge planning-subacute versus acute rehab pending PT re-eval. 70 yo M PMHx CAD s/p stent x 1 (2010), T2DM, HTN, Parkinson's presenting with c/o 10/10 chest pain described as burning radiating from abdomen to midsternum x 1 day. Patient reports symptoms started at 2 pm and resolved after 1 hour. Chest pain began after he ate lunch and was walking up a flight of stairs. He reports chest pain was associated with dyspnea, diaphoresis, and nausea. At baseline, he ambulates with cane/walker. Takes medications daily including aspirin and prasugrel. On ROS, reports chronic left leg swelling x 1-2 years. Pt s/p cardiac cath at [facility] found to have multivessel disease requiring transfer to [facility] CT surgery service.   9/26 Nephrology consulted, aldactone discontinued. Pre op Transthoracic Echocardiogram Mitral annular calcification, otherwise normal mitral  valve. Calcified trileaflet aortic valve with normal opening. Mild aortic regurgitation. Mild concentric left ventricular hypertrophy. Endocardium not well visualized; grossly normal left ventricular systolic function.  9/27 VVS; Pre op Cardiac Surgery work up in progress. PT evaluation to assess pre op baseline activity level.  Plan for cardiac surgery in next week Wednesday with Dr. Guillory.  On 10/2/19 C5L LIMA to LAD, SVG to RPDA, SVG to RPL, SVG to Ramus, SVG to OM.   Post op Course:   Extubated on POD # 1  Pressor support required à weaned off   (+) dizziness à Neurology following à Unclear of etiology   CKD à Renal following; on Torsemide 10 mg PO daily    Hyperglycemia / Hypothyroid à Endo following. Continue on Synthroid 50 mcg PO daily.   10/7 Transferred to 2 [facility].   10/8 VSS - SR 58 - pt offers no complaints- alex patent, d/c plan to rehab  10/9 HD stable. Pt yet to have BM post-op-given mag citrate this PM.   Alex d/c'd today. Voiding well post-removal.   Discharge planning-subacute versus acute rehab pending PT re-eval. 70 yo M PMHx CAD s/p stent x 1 (2010), T2DM, HTN, Parkinson's presenting with c/o 10/10 chest pain described as burning radiating from abdomen to midsternum x 1 day. Patient reports symptoms started at 2 pm and resolved after 1 hour. Chest pain began after he ate lunch and was walking up a flight of stairs. He reports chest pain was associated with dyspnea, diaphoresis, and nausea. At baseline, he ambulates with cane/walker. Takes medications daily including aspirin and prasugrel. On ROS, reports chronic left leg swelling x 1-2 years. Pt s/p cardiac cath at [facility] found to have multivessel disease requiring transfer to [facility] CT surgery service.   9/26 Nephrology consulted, aldactone discontinued. Pre op Transthoracic Echocardiogram Mitral annular calcification, otherwise normal mitral  valve. Calcified trileaflet aortic valve with normal opening. Mild aortic regurgitation. Mild concentric left ventricular hypertrophy. Endocardium not well visualized; grossly normal left ventricular systolic function.  9/27 VVS; Pre op Cardiac Surgery work up in progress. PT evaluation to assess pre op baseline activity level.  Plan for cardiac surgery in next week Wednesday with Dr. Guillory.  On 10/2/19 C5L LIMA to LAD, SVG to RPDA, SVG to RPL, SVG to Ramus, SVG to OM.   Post op Course:   Extubated on POD # 1  Pressor support required à weaned off   (+) dizziness à Neurology following à Unclear of etiology   CKD à Renal following; on Torsemide 10 mg PO daily    Hyperglycemia / Hypothyroid à Endo following. Continue on Synthroid 50 mcg PO daily.   10/7 Transferred to 2 [facility].   10/8 VSS - SR 58 - pt offers no complaints- alex patent, d/c plan to rehab  10/9 HD stable. Pt yet to have BM post-op-given mag citrate this PM.   Alex d/c'd today. Voiding well post-removal.   Discharge planning-*change* re-eval-acute rehab.

## 2019-10-09 NOTE — PROGRESS NOTE ADULT - ATTENDING COMMENTS
CARDIOLOGY ATTENDING    Patient seen and examined. Agree with above. No further inpatient cardiac workup needed. Doing well s/p CABG. Continue current medical therapy. F/U with Charlie Desir after discharge CARDIOLOGY ATTENDING    Patient seen and examined. Agree with above. No further inpatient cardiac workup needed. Doing well s/p CABG. Continue current medical therapy. F/U with his cardiologist Dr Wagner Simons after discharge

## 2019-10-09 NOTE — CHART NOTE - NSCHARTNOTEFT_GEN_A_CORE
Pt seen for malnutrition follow-up.    Pt is a 69 year old male with PMH CAD s/p stent x 1 (2010), T2DM, HTN, Parkinson's presenting with c/o 10/10 chest pain. Pt s/p cardiac cath at [facility] found to have multivessel disease. Now s/p 10/2/19 CABG x 5,     Source: Patient [x]    Family [ ]     other [x] electronic medical records      Diet: DASH/TLC, 1200 ml Fluid Restriction      Patient reports [ ] nausea  [ ] vomiting [ ] diarrhea [ ] constipation  [ ]chewing problems [ ] swallowing issues  [x] other:       PO intake:  < 50% [ ] 50-75% [ ]   % [x]  other :      Source for PO intake [x] Patient [ ] family [x] chart [ ] staff [ ] other      Enteral/Parenteral Nutrition: n/a      Current Weight: 223.1 pounds (current, standing, +1 generalized edema, +3 B/L leg edema noted).       Pertinent Medications: MEDICATIONS  (STANDING):  acetaminophen   Tablet .. 325 milliGRAM(s) Oral once  amiodarone    Tablet 200 milliGRAM(s) Oral daily  aspirin enteric coated 325 milliGRAM(s) Oral daily  atorvastatin 80 milliGRAM(s) Oral at bedtime  bisacodyl Suppository 10 milliGRAM(s) Rectal once  carbidopa/levodopa  25/100 2 Tablet(s) Oral three times a day  chlorhexidine 2% Cloths 1 Application(s) Topical <User Schedule>  dextrose 5%. 1000 milliLiter(s) (50 mL/Hr) IV Continuous <Continuous>  dextrose 50% Injectable 12.5 Gram(s) IV Push once  dextrose 50% Injectable 25 Gram(s) IV Push once  dextrose 50% Injectable 25 Gram(s) IV Push once  dextrose 50% Injectable 50 milliLiter(s) IV Push every 15 minutes  docusate sodium 100 milliGRAM(s) Oral three times a day  epoetin mari Injectable 4000 Unit(s) SubCutaneous every 7 days  ferrous    sulfate 325 milliGRAM(s) Oral daily  folic acid 1 milliGRAM(s) Oral daily  heparin  Injectable 5000 Unit(s) SubCutaneous every 8 hours  insulin glargine Injectable (LANTUS) 28 Unit(s) SubCutaneous at bedtime  insulin lispro (HumaLOG) corrective regimen sliding scale   SubCutaneous three times a day before meals  insulin lispro (HumaLOG) corrective regimen sliding scale   SubCutaneous at bedtime  insulin lispro Injectable (HumaLOG) 10 Unit(s) SubCutaneous three times a day before meals  levothyroxine 50 MICROGram(s) Oral daily  metoprolol tartrate 25 milliGRAM(s) Oral every 8 hours  pantoprazole    Tablet 40 milliGRAM(s) Oral before breakfast  polyethylene glycol 3350 17 Gram(s) Oral daily  sodium chloride 0.9% lock flush 3 milliLiter(s) IV Push every 8 hours  torsemide 10 milliGRAM(s) Oral daily  trihexyphenidyl 2 milliGRAM(s) Oral three times a day    MEDICATIONS  (PRN):  dextrose 40% Gel 15 Gram(s) Oral once PRN Blood Glucose LESS THAN 70 milliGRAM(s)/deciliter  glucagon  Injectable 1 milliGRAM(s) IntraMuscular once PRN Glucose LESS THAN 70 milligrams/deciliter  magnesium citrate Oral Solution 1 Bottle Oral once PRN Constipation  oxyCODONE    5 mG/acetaminophen 325 mG 1 Tablet(s) Oral every 6 hours PRN Moderate Pain (4 - 6)  oxyCODONE    5 mG/acetaminophen 325 mG 2 Tablet(s) Oral every 6 hours PRN Severe Pain (7 - 10)    Pertinent Labs:  10-09 Na134 mmol/L<L> Glu 106 mg/dL<H> K+ 4.0 mmol/L Cr  3.32 mg/dL<H> BUN 56 mg/dL<H> 10-07 Phos 3.2 mg/dL 10-07 Alb 2.8 g/dL<L> 09-26 [id number] 7.2 %<H> 09-22 Chol 152 mg/dL  mg/dL HDL 38 mg/dL Trig 75 mg/dL  CAPILLARY BLOOD GLUCOSE  POCT Blood Glucose.: 107 mg/dL (09 Oct 2019 12:37)  POCT Blood Glucose.: 160 mg/dL (09 Oct 2019 07:57)  POCT Blood Glucose.: 122 mg/dL (08 Oct 2019 21:20)  POCT Blood Glucose.: 139 mg/dL (08 Oct 2019 17:44)      Skin: Surgical incision noted      Estimated Needs:   [x] no change since previous assessment  [ ] recalculated:         Previous Nutrition Diagnosis: Moderate malnutrition, Increased nutrient needs         Nutrition Diagnosis is [x] care plan is ongoing  [ ] resolved [ ] not applicable          New Nutrition Diagnosis: [x] not applicable       Interventions:     1)        Monitoring and Evaluation:     [x] PO intake [x] Tolerance to diet prescription [x] weights [x] follow up per protocol    [x] other: RD remains available Pt seen for malnutrition follow-up. Pt still with fair po intake, observed picking at lunch meal, states he does not have a good appetite and doesn't like the food. Denies specific meal preferences but is amenable to oral supplement.    Pt is a 69 year old male with PMH CAD s/p stent x 1 (2010), T2DM, HTN, Parkinson's presenting with c/o 10/10 chest pain. Pt s/p cardiac cath at [facility] found to have multivessel disease. Now s/p 10/2/19 CABG x 5.     Source: Patient [x]    Family [ ]     other [x] electronic medical records      Diet: DASH/TLC, 1200 ml Fluid Restriction      Patient reports [ ] nausea  [ ] vomiting [ ] diarrhea [ ] constipation  [ ]chewing problems [ ] swallowing issues  [x] other: reduced appetite      PO intake:  < 50% [x] 50-75% [x]   % [ ]  other :      Source for PO intake [x] Patient [ ] family [x] chart [ ] staff [ ] other      Enteral/Parenteral Nutrition: n/a      Current Weight: 223.1 pounds (current, standing, +1 generalized edema, +3 B/L leg edema noted).  239.6 pounds 9/25/19. Pt s/p lasix      Pertinent Medications: MEDICATIONS  (STANDING):  acetaminophen   Tablet .. 325 milliGRAM(s) Oral once  amiodarone    Tablet 200 milliGRAM(s) Oral daily  aspirin enteric coated 325 milliGRAM(s) Oral daily  atorvastatin 80 milliGRAM(s) Oral at bedtime  bisacodyl Suppository 10 milliGRAM(s) Rectal once  carbidopa/levodopa  25/100 2 Tablet(s) Oral three times a day  chlorhexidine 2% Cloths 1 Application(s) Topical <User Schedule>  dextrose 5%. 1000 milliLiter(s) (50 mL/Hr) IV Continuous <Continuous>  dextrose 50% Injectable 12.5 Gram(s) IV Push once  dextrose 50% Injectable 25 Gram(s) IV Push once  dextrose 50% Injectable 25 Gram(s) IV Push once  dextrose 50% Injectable 50 milliLiter(s) IV Push every 15 minutes  docusate sodium 100 milliGRAM(s) Oral three times a day  epoetin mari Injectable 4000 Unit(s) SubCutaneous every 7 days  ferrous    sulfate 325 milliGRAM(s) Oral daily  folic acid 1 milliGRAM(s) Oral daily  heparin  Injectable 5000 Unit(s) SubCutaneous every 8 hours  insulin glargine Injectable (LANTUS) 28 Unit(s) SubCutaneous at bedtime  insulin lispro (HumaLOG) corrective regimen sliding scale   SubCutaneous three times a day before meals  insulin lispro (HumaLOG) corrective regimen sliding scale   SubCutaneous at bedtime  insulin lispro Injectable (HumaLOG) 10 Unit(s) SubCutaneous three times a day before meals  levothyroxine 50 MICROGram(s) Oral daily  metoprolol tartrate 25 milliGRAM(s) Oral every 8 hours  pantoprazole    Tablet 40 milliGRAM(s) Oral before breakfast  polyethylene glycol 3350 17 Gram(s) Oral daily  sodium chloride 0.9% lock flush 3 milliLiter(s) IV Push every 8 hours  torsemide 10 milliGRAM(s) Oral daily  trihexyphenidyl 2 milliGRAM(s) Oral three times a day    MEDICATIONS  (PRN):  dextrose 40% Gel 15 Gram(s) Oral once PRN Blood Glucose LESS THAN 70 milliGRAM(s)/deciliter  glucagon  Injectable 1 milliGRAM(s) IntraMuscular once PRN Glucose LESS THAN 70 milligrams/deciliter  magnesium citrate Oral Solution 1 Bottle Oral once PRN Constipation  oxyCODONE    5 mG/acetaminophen 325 mG 1 Tablet(s) Oral every 6 hours PRN Moderate Pain (4 - 6)  oxyCODONE    5 mG/acetaminophen 325 mG 2 Tablet(s) Oral every 6 hours PRN Severe Pain (7 - 10)    Pertinent Labs:  10-09 Na134 mmol/L<L> Glu 106 mg/dL<H> K+ 4.0 mmol/L Cr  3.32 mg/dL<H> BUN 56 mg/dL<H> 10-07 Phos 3.2 mg/dL 10-07 Alb 2.8 g/dL<L> 09-26 [id number] 7.2 %<H> 09-22 Chol 152 mg/dL  mg/dL HDL 38 mg/dL Trig 75 mg/dL  CAPILLARY BLOOD GLUCOSE  POCT Blood Glucose.: 107 mg/dL (09 Oct 2019 12:37)  POCT Blood Glucose.: 160 mg/dL (09 Oct 2019 07:57)  POCT Blood Glucose.: 122 mg/dL (08 Oct 2019 21:20)  POCT Blood Glucose.: 139 mg/dL (08 Oct 2019 17:44)      Skin: Surgical incision noted      Estimated Needs:   [x] no change since previous assessment  [ ] recalculated:         Previous Nutrition Diagnosis: Moderate malnutrition, Increased nutrient needs         Nutrition Diagnosis is [x] care plan is ongoing  [ ] resolved [ ] not applicable          New Nutrition Diagnosis: [x] not applicable       Interventions:     1) Recommend continue DASH diet to promote heart health  -Fluid restriction per MD order  -Recommend add Glucerna x 2 containers daily for additional 440 kcal, 20 grams protein in setting of poor meal intake. Please count towards fluid allowance. Pt prefers chocolate or strawberry flavors.       Monitoring and Evaluation:     [x] PO intake [x] Tolerance to diet prescription [x] weights [x] follow up per protocol    [x] other: RD remains available

## 2019-10-09 NOTE — PROGRESS NOTE ADULT - PROBLEM SELECTOR PLAN 4
Renal following   Monitor renal function   Torsemide d/c'd 10/8  Javier d/c'd 10/9 Renal following   Monitor renal function   Torsemide d/c'd 10/8  Javier d/c'd 10/9-voiding well post-removal

## 2019-10-09 NOTE — PROGRESS NOTE ADULT - ASSESSMENT
Assessment  DMT2: 69y Male with DM T2 with hyperglycemia, A1C 7.2%, was on oral meds and insulin at home, S/P CABG on insulin, blood sugars improving, he is lethargic today, family at bed side, no hypoglycemic episodes, eating partial meals, ambulating, appears comfortable. Planning d/c to [facility] once medically cleared.  Hypothyroidism: Patient has no hx thyroid dz, was not on any thyroid supplements, found incidentally with TSH 6.22 and was started on synthroid 50mcg PO daily.  CAD: postop CABG 10/2, on medications, no chest pain, stable, monitored.  HTN: Controlled,  on antihypertensive medications.  Parkinson's: on meds, stable.          Kelsie Reece MD  Cell: [phone]  Office: [phone]

## 2019-10-09 NOTE — PROGRESS NOTE ADULT - PROBLEM SELECTOR PLAN 1
Continue with  mg PO Daily.  Continue with Lopressor 25 mg PO TID.   Continue with Amio 200 mg PO daily   Continue with Lipitor 80 mg PO HS   Continue with PT/OT   Glycemic Control per Endo   Monitor Renal function   Increase activity as tolerated.   Encourage Chest PT / Pulmonary toileting and Incentive spirometry every 1 hour x 10 while awake.   Continue with PUD and DVT prophylaxis.   Shower on POD #5.   D/C plan Subacute versus acute rehab Continue with  mg PO Daily.  Continue with Lopressor 25 mg PO TID.   Continue with Amio 200 mg PO daily   Continue with Lipitor 80 mg PO HS   Continue with PT/OT   Glycemic Control per Endo   Monitor Renal function   Increase activity as tolerated.   Encourage Chest PT / Pulmonary toileting and Incentive spirometry every 1 hour x 10 while awake.   Continue with PUD and DVT prophylaxis.   Shower on POD #5.   D/C plan- acute rehab

## 2019-10-09 NOTE — PROGRESS NOTE ADULT - ASSESSMENT
70 yo M PMHx CAD s/p stent x 1 (2010), T2DM, HTN, Parkinson's and CKD stage 3b presents with chest pain. S/P cath with MVD- POD #7  CABG  Subnephrotic range proteinuria     1. Renal - DMZ3c-6.  Trend BMP and urine output- ABBY is better as is the edema    2. CVS   BP is stable     3.. Hyponatremia- hypervolemic     4. Anemia s/p Epogen     Recommendations  - Trend renal profile and I agree with torsemide/  No need to stop;  SP zaroxolyn yesterday and the edema is better  -DC the johnson today     - Fluid restriction 1.2 L per day   - Creatinine improving       Eventual subacute rehab

## 2019-10-09 NOTE — PROGRESS NOTE ADULT - SUBJECTIVE AND OBJECTIVE BOX
Chief complaint  Patient is a 69y old  Male who presents with a chief complaint of Multivessel disease (09 Oct 2019 11:13)   Review of systems  Patient in bed, looks comfortable, no fever,  no hypoglycemia.    Labs and Fingersticks  CAPILLARY BLOOD GLUCOSE      POCT Blood Glucose.: 107 mg/dL (09 Oct 2019 12:37)  POCT Blood Glucose.: 160 mg/dL (09 Oct 2019 07:57)  POCT Blood Glucose.: 122 mg/dL (08 Oct 2019 21:20)  POCT Blood Glucose.: 139 mg/dL (08 Oct 2019 17:44)      Anion Gap, Serum: 13 (10-09 @ 05:50)  Anion Gap, Serum: 12 (10-08 @ 07:14)      Calcium, Total Serum: 8.8 (10-09 @ 05:50)  Calcium, Total Serum: 8.8 (10-08 @ 07:14)          10-09    134<L>  |  95<L>  |  56<H>  ----------------------------<  106<H>  4.0   |  26  |  3.32<H>    Ca    8.8      09 Oct 2019 05:50                          9.5    11.02 )-----------( 314      ( 09 Oct 2019 05:50 )             28.7     Medications  MEDICATIONS  (STANDING):  acetaminophen   Tablet .. 325 milliGRAM(s) Oral once  amiodarone    Tablet 200 milliGRAM(s) Oral daily  aspirin enteric coated 325 milliGRAM(s) Oral daily  atorvastatin 80 milliGRAM(s) Oral at bedtime  bisacodyl Suppository 10 milliGRAM(s) Rectal once  carbidopa/levodopa  25/100 2 Tablet(s) Oral three times a day  chlorhexidine 2% Cloths 1 Application(s) Topical <User Schedule>  dextrose 5%. 1000 milliLiter(s) (50 mL/Hr) IV Continuous <Continuous>  dextrose 50% Injectable 12.5 Gram(s) IV Push once  dextrose 50% Injectable 25 Gram(s) IV Push once  dextrose 50% Injectable 25 Gram(s) IV Push once  dextrose 50% Injectable 50 milliLiter(s) IV Push every 15 minutes  docusate sodium 100 milliGRAM(s) Oral three times a day  epoetin mari Injectable 4000 Unit(s) SubCutaneous every 7 days  ferrous    sulfate 325 milliGRAM(s) Oral daily  folic acid 1 milliGRAM(s) Oral daily  heparin  Injectable 5000 Unit(s) SubCutaneous every 8 hours  insulin glargine Injectable (LANTUS) 28 Unit(s) SubCutaneous at bedtime  insulin lispro (HumaLOG) corrective regimen sliding scale   SubCutaneous three times a day before meals  insulin lispro (HumaLOG) corrective regimen sliding scale   SubCutaneous at bedtime  insulin lispro Injectable (HumaLOG) 10 Unit(s) SubCutaneous three times a day before meals  levothyroxine 50 MICROGram(s) Oral daily  metoprolol tartrate 25 milliGRAM(s) Oral every 8 hours  pantoprazole    Tablet 40 milliGRAM(s) Oral before breakfast  polyethylene glycol 3350 17 Gram(s) Oral daily  sodium chloride 0.9% lock flush 3 milliLiter(s) IV Push every 8 hours  torsemide 10 milliGRAM(s) Oral daily  trihexyphenidyl 2 milliGRAM(s) Oral three times a day      Physical Exam  General: Patient comfortable in bed  Vital Signs Last 12 Hrs  T(F): 97.9 (10-09-19 @ 04:21), Max: 97.9 (10-09-19 @ 04:21)  HR: 59 (10-09-19 @ 04:21) (59 - 59)  BP: 136/75 (10-09-19 @ 04:21) (136/75 - 136/75)  BP(mean): --  RR: 18 (10-09-19 @ 04:21) (18 - 18)  SpO2: 95% (10-09-19 @ 04:21) (95% - 95%)  Neck: No palpable thyroid nodules.  CVS: S1S2, No murmurs  Respiratory: No wheezing, no crepitations  GI: Abdomen soft, bowel sounds positive  Musculoskeletal:  edema lower extremities.   Skin: No skin rashes, no ecchymosis    Diagnostics

## 2019-10-10 LAB
ANION GAP SERPL CALC-SCNC: 14 MMOL/L — SIGNIFICANT CHANGE UP (ref 5–17)
BUN SERPL-MCNC: 59 MG/DL — HIGH (ref 7–23)
CALCIUM SERPL-MCNC: 9.7 MG/DL — SIGNIFICANT CHANGE UP (ref 8.4–10.5)
CHLORIDE SERPL-SCNC: 92 MMOL/L — LOW (ref 96–108)
CO2 SERPL-SCNC: 28 MMOL/L — SIGNIFICANT CHANGE UP (ref 22–31)
CORTIS AM PEAK SERPL-MCNC: 23 UG/DL — HIGH (ref 6–18.4)
CREAT SERPL-MCNC: 3.27 MG/DL — HIGH (ref 0.5–1.3)
GLUCOSE SERPL-MCNC: 86 MG/DL — SIGNIFICANT CHANGE UP (ref 70–99)
HCT VFR BLD CALC: 29.8 % — LOW (ref 39–50)
HGB BLD-MCNC: 10 G/DL — LOW (ref 13–17)
MCHC RBC-ENTMCNC: 30.3 PG — SIGNIFICANT CHANGE UP (ref 27–34)
MCHC RBC-ENTMCNC: 33.6 GM/DL — SIGNIFICANT CHANGE UP (ref 32–36)
MCV RBC AUTO: 90.3 FL — SIGNIFICANT CHANGE UP (ref 80–100)
NRBC # BLD: 0 /100 WBCS — SIGNIFICANT CHANGE UP (ref 0–0)
PLATELET # BLD AUTO: 388 K/UL — SIGNIFICANT CHANGE UP (ref 150–400)
POTASSIUM SERPL-MCNC: 3.9 MMOL/L — SIGNIFICANT CHANGE UP (ref 3.5–5.3)
POTASSIUM SERPL-SCNC: 3.9 MMOL/L — SIGNIFICANT CHANGE UP (ref 3.5–5.3)
RBC # BLD: 3.3 M/UL — LOW (ref 4.2–5.8)
RBC # FLD: 14.5 % — SIGNIFICANT CHANGE UP (ref 10.3–14.5)
SODIUM SERPL-SCNC: 134 MMOL/L — LOW (ref 135–145)
WBC # BLD: 12.77 K/UL — HIGH (ref 3.8–10.5)
WBC # FLD AUTO: 12.77 K/UL — HIGH (ref 3.8–10.5)

## 2019-10-10 PROCEDURE — 71046 X-RAY EXAM CHEST 2 VIEWS: CPT | Mod: 26

## 2019-10-10 RX ADMIN — ATORVASTATIN CALCIUM 80 MILLIGRAM(S): 80 TABLET, FILM COATED ORAL at 21:10

## 2019-10-10 RX ADMIN — Medication 2 MILLIGRAM(S): at 05:27

## 2019-10-10 RX ADMIN — CHLORHEXIDINE GLUCONATE 1 APPLICATION(S): 213 SOLUTION TOPICAL at 14:22

## 2019-10-10 RX ADMIN — SODIUM CHLORIDE 3 MILLILITER(S): 9 INJECTION INTRAMUSCULAR; INTRAVENOUS; SUBCUTANEOUS at 21:18

## 2019-10-10 RX ADMIN — CARBIDOPA AND LEVODOPA 2 TABLET(S): 25; 100 TABLET ORAL at 05:28

## 2019-10-10 RX ADMIN — Medication 325 MILLIGRAM(S): at 12:37

## 2019-10-10 RX ADMIN — Medication 10 UNIT(S): at 08:44

## 2019-10-10 RX ADMIN — PANTOPRAZOLE SODIUM 40 MILLIGRAM(S): 20 TABLET, DELAYED RELEASE ORAL at 05:27

## 2019-10-10 RX ADMIN — Medication 25 MILLIGRAM(S): at 21:11

## 2019-10-10 RX ADMIN — SODIUM CHLORIDE 3 MILLILITER(S): 9 INJECTION INTRAMUSCULAR; INTRAVENOUS; SUBCUTANEOUS at 14:25

## 2019-10-10 RX ADMIN — Medication 10 UNIT(S): at 12:36

## 2019-10-10 RX ADMIN — Medication 50 MICROGRAM(S): at 05:28

## 2019-10-10 RX ADMIN — AMIODARONE HYDROCHLORIDE 200 MILLIGRAM(S): 400 TABLET ORAL at 05:28

## 2019-10-10 RX ADMIN — Medication 25 MILLIGRAM(S): at 05:28

## 2019-10-10 RX ADMIN — POLYETHYLENE GLYCOL 3350 17 GRAM(S): 17 POWDER, FOR SOLUTION ORAL at 12:38

## 2019-10-10 RX ADMIN — HEPARIN SODIUM 5000 UNIT(S): 5000 INJECTION INTRAVENOUS; SUBCUTANEOUS at 21:11

## 2019-10-10 RX ADMIN — Medication 2 MILLIGRAM(S): at 14:25

## 2019-10-10 RX ADMIN — CARBIDOPA AND LEVODOPA 2 TABLET(S): 25; 100 TABLET ORAL at 14:24

## 2019-10-10 RX ADMIN — Medication 100 MILLIGRAM(S): at 14:24

## 2019-10-10 RX ADMIN — Medication 2 MILLIGRAM(S): at 21:11

## 2019-10-10 RX ADMIN — CARBIDOPA AND LEVODOPA 2 TABLET(S): 25; 100 TABLET ORAL at 21:10

## 2019-10-10 RX ADMIN — Medication 100 MILLIGRAM(S): at 21:11

## 2019-10-10 RX ADMIN — SODIUM CHLORIDE 3 MILLILITER(S): 9 INJECTION INTRAMUSCULAR; INTRAVENOUS; SUBCUTANEOUS at 05:32

## 2019-10-10 RX ADMIN — Medication 1 MILLIGRAM(S): at 12:37

## 2019-10-10 RX ADMIN — HEPARIN SODIUM 5000 UNIT(S): 5000 INJECTION INTRAVENOUS; SUBCUTANEOUS at 14:24

## 2019-10-10 RX ADMIN — Medication 25 MILLIGRAM(S): at 14:25

## 2019-10-10 RX ADMIN — HEPARIN SODIUM 5000 UNIT(S): 5000 INJECTION INTRAVENOUS; SUBCUTANEOUS at 05:27

## 2019-10-10 NOTE — PROGRESS NOTE ADULT - SUBJECTIVE AND OBJECTIVE BOX
Chief complaint  Patient is a 69y old  Male who presents with a chief complaint of Multivessel disease (10 Oct 2019 09:10)   Review of systems  Patient in bed, looks comfortable, no fever, no hypoglycemia.    Labs and Fingersticks  CAPILLARY BLOOD GLUCOSE      POCT Blood Glucose.: 96 mg/dL (10 Oct 2019 07:42)  POCT Blood Glucose.: 106 mg/dL (09 Oct 2019 22:03)  POCT Blood Glucose.: 136 mg/dL (09 Oct 2019 16:52)  POCT Blood Glucose.: 107 mg/dL (09 Oct 2019 12:37)      Anion Gap, Serum: 14 (10-10 @ 06:32)  Anion Gap, Serum: 13 (10-09 @ 05:50)      Calcium, Total Serum: 9.7 (10-10 @ 06:32)  Calcium, Total Serum: 8.8 (10-09 @ 05:50)          10-10    134<L>  |  92<L>  |  59<H>  ----------------------------<  86  3.9   |  28  |  3.27<H>    Ca    9.7      10 Oct 2019 06:32                          10.0   12.77 )-----------( 388      ( 10 Oct 2019 06:32 )             29.8     Medications  MEDICATIONS  (STANDING):  acetaminophen   Tablet .. 325 milliGRAM(s) Oral once  amiodarone    Tablet 200 milliGRAM(s) Oral daily  aspirin enteric coated 325 milliGRAM(s) Oral daily  atorvastatin 80 milliGRAM(s) Oral at bedtime  bisacodyl Suppository 10 milliGRAM(s) Rectal once  carbidopa/levodopa  25/100 2 Tablet(s) Oral three times a day  chlorhexidine 2% Cloths 1 Application(s) Topical <User Schedule>  dextrose 5%. 1000 milliLiter(s) (50 mL/Hr) IV Continuous <Continuous>  dextrose 50% Injectable 12.5 Gram(s) IV Push once  dextrose 50% Injectable 25 Gram(s) IV Push once  dextrose 50% Injectable 25 Gram(s) IV Push once  dextrose 50% Injectable 50 milliLiter(s) IV Push every 15 minutes  docusate sodium 100 milliGRAM(s) Oral three times a day  epoetin mari Injectable 4000 Unit(s) SubCutaneous every 7 days  ferrous    sulfate 325 milliGRAM(s) Oral daily  folic acid 1 milliGRAM(s) Oral daily  heparin  Injectable 5000 Unit(s) SubCutaneous every 8 hours  insulin glargine Injectable (LANTUS) 28 Unit(s) SubCutaneous at bedtime  insulin lispro (HumaLOG) corrective regimen sliding scale   SubCutaneous three times a day before meals  insulin lispro (HumaLOG) corrective regimen sliding scale   SubCutaneous at bedtime  insulin lispro Injectable (HumaLOG) 10 Unit(s) SubCutaneous three times a day before meals  levothyroxine 50 MICROGram(s) Oral daily  metoprolol tartrate 25 milliGRAM(s) Oral every 8 hours  pantoprazole    Tablet 40 milliGRAM(s) Oral before breakfast  polyethylene glycol 3350 17 Gram(s) Oral daily  sodium chloride 0.9% lock flush 3 milliLiter(s) IV Push every 8 hours  trihexyphenidyl 2 milliGRAM(s) Oral three times a day      Physical Exam  General: Patient comfortable in bed  Vital Signs Last 12 Hrs  T(F): 97.4 (10-10-19 @ 05:00), Max: 97.4 (10-10-19 @ 05:00)  HR: 70 (10-10-19 @ 05:00) (70 - 70)  BP: 156/73 (10-10-19 @ 05:00) (156/73 - 156/73)  BP(mean): --  RR: 18 (10-10-19 @ 05:00) (18 - 18)  SpO2: 98% (10-10-19 @ 05:00) (98% - 98%)  Neck: No palpable thyroid nodules.  CVS: S1S2, No murmurs  Respiratory: No wheezing, no crepitations  GI: Abdomen soft, bowel sounds positive  Musculoskeletal:  edema lower extremities.   Skin: No skin rashes, no ecchymosis    Diagnostics    Free Thyroxine, Serum: AM Sched. Collection: 30-Sep-2019 06:00 (09-29 @ 10:47)  Free Thyroxine, Serum: AM Sched. Collection: 11-Oct-2019 06:00 (10-10 @ 12:19)  Thyroid Stimulating Hormone, Serum: AM Sched. Collection: 11-Oct-2019 06:00 (10-10 @ 12:19)  Cortisol AM, Serum: Routine (10-10 @ 12:19) Chief complaint  Patient is a 69y old  Male who presents with a chief complaint of Multivessel disease (10 Oct 2019 09:10)   Review of systems  Patient in bed, looks comfortable, no fever,  no hypoglycemia.    Labs and Fingersticks  CAPILLARY BLOOD GLUCOSE      POCT Blood Glucose.: 96 mg/dL (10 Oct 2019 07:42)  POCT Blood Glucose.: 106 mg/dL (09 Oct 2019 22:03)  POCT Blood Glucose.: 136 mg/dL (09 Oct 2019 16:52)  POCT Blood Glucose.: 107 mg/dL (09 Oct 2019 12:37)      Anion Gap, Serum: 14 (10-10 @ 06:32)  Anion Gap, Serum: 13 (10-09 @ 05:50)      Calcium, Total Serum: 9.7 (10-10 @ 06:32)  Calcium, Total Serum: 8.8 (10-09 @ 05:50)          10-10    134<L>  |  92<L>  |  59<H>  ----------------------------<  86  3.9   |  28  |  3.27<H>    Ca    9.7      10 Oct 2019 06:32                          10.0   12.77 )-----------( 388      ( 10 Oct 2019 06:32 )             29.8     Medications  MEDICATIONS  (STANDING):  acetaminophen   Tablet .. 325 milliGRAM(s) Oral once  amiodarone    Tablet 200 milliGRAM(s) Oral daily  aspirin enteric coated 325 milliGRAM(s) Oral daily  atorvastatin 80 milliGRAM(s) Oral at bedtime  bisacodyl Suppository 10 milliGRAM(s) Rectal once  carbidopa/levodopa  25/100 2 Tablet(s) Oral three times a day  chlorhexidine 2% Cloths 1 Application(s) Topical <User Schedule>  dextrose 5%. 1000 milliLiter(s) (50 mL/Hr) IV Continuous <Continuous>  dextrose 50% Injectable 12.5 Gram(s) IV Push once  dextrose 50% Injectable 25 Gram(s) IV Push once  dextrose 50% Injectable 25 Gram(s) IV Push once  dextrose 50% Injectable 50 milliLiter(s) IV Push every 15 minutes  docusate sodium 100 milliGRAM(s) Oral three times a day  epoetin mari Injectable 4000 Unit(s) SubCutaneous every 7 days  ferrous    sulfate 325 milliGRAM(s) Oral daily  folic acid 1 milliGRAM(s) Oral daily  heparin  Injectable 5000 Unit(s) SubCutaneous every 8 hours  insulin glargine Injectable (LANTUS) 28 Unit(s) SubCutaneous at bedtime  insulin lispro (HumaLOG) corrective regimen sliding scale   SubCutaneous three times a day before meals  insulin lispro (HumaLOG) corrective regimen sliding scale   SubCutaneous at bedtime  insulin lispro Injectable (HumaLOG) 10 Unit(s) SubCutaneous three times a day before meals  levothyroxine 50 MICROGram(s) Oral daily  metoprolol tartrate 25 milliGRAM(s) Oral every 8 hours  pantoprazole    Tablet 40 milliGRAM(s) Oral before breakfast  polyethylene glycol 3350 17 Gram(s) Oral daily  sodium chloride 0.9% lock flush 3 milliLiter(s) IV Push every 8 hours  trihexyphenidyl 2 milliGRAM(s) Oral three times a day      Physical Exam  General: Patient comfortable in bed  Vital Signs Last 12 Hrs  T(F): 97.4 (10-10-19 @ 05:00), Max: 97.4 (10-10-19 @ 05:00)  HR: 70 (10-10-19 @ 05:00) (70 - 70)  BP: 156/73 (10-10-19 @ 05:00) (156/73 - 156/73)  BP(mean): --  RR: 18 (10-10-19 @ 05:00) (18 - 18)  SpO2: 98% (10-10-19 @ 05:00) (98% - 98%)  Neck: No palpable thyroid nodules.  CVS: S1S2, No murmurs  Respiratory: No wheezing, no crepitations  GI: Abdomen soft, bowel sounds positive  Musculoskeletal:  edema lower extremities.   Skin: No skin rashes, no ecchymosis    Diagnostics    Free Thyroxine, Serum: AM Sched. Collection: 30-Sep-2019 06:00 (09-29 @ 10:47)  Free Thyroxine, Serum: AM Sched. Collection: 11-Oct-2019 06:00 (10-10 @ 12:19)  Thyroid Stimulating Hormone, Serum: AM Sched. Collection: 11-Oct-2019 06:00 (10-10 @ 12:19)  Cortisol AM, Serum: Routine (10-10 @ 12:19)

## 2019-10-10 NOTE — PROGRESS NOTE ADULT - ASSESSMENT
Assessment  DMT2: 69y Male with DM T2 with hyperglycemia, A1C 7.2%, was on oral meds and insulin at home, S/P CABG on insulin, blood sugars trending within acceptable range, no hypoglycemic episodes, eating partial meals, appears lethargic, undergoing orthostatics exam this AM. Planning d/c to acute rehab once medically cleared.  Hypothyroidism: Patient has no hx thyroid dz, was not on any thyroid supplements, found incidentally with TSH 6.22 and was started on synthroid 50mcg PO daily.  CAD: postop CABG 10/2, on medications, no chest pain, stable, monitored.  HTN: Controlled,  on antihypertensive medications.  Parkinson's: on meds, stable.          Kelsie Reece MD  Cell: [phone]  Office: [phone] Assessment  DMT2: 69y Male with DM T2 with hyperglycemia, A1C 7.2%, was on oral meds and insulin at home, S/P CABG on insulin, blood sugars trending within acceptable range, no hypoglycemic episodes, eating partial meals, appears lethargic, undergoing orthostatics exam this AM. Planning d/c to acute rehab once medically cleared.  Hypothyroidism: Patient has no hx thyroid dz, was not on any thyroid supplements, found incidentally  with TSH 6.22 and was started on synthroid 50mcg PO daily.  CAD: postop CABG 10/2, on medications, no chest pain, stable, monitored.  HTN: Controlled,  on antihypertensive medications.  Parkinson's: on meds, stable.          Kelsie Reece MD  Cell: [phone]  Office: [phone]

## 2019-10-10 NOTE — PROGRESS NOTE ADULT - SUBJECTIVE AND OBJECTIVE BOX
NEPHROLOGY-[facility] [phone]        Patient seen and examined in bed.  He was in good spirits         MEDICATIONS  (STANDING):  acetaminophen   Tablet .. 325 milliGRAM(s) Oral once  amiodarone    Tablet 200 milliGRAM(s) Oral daily  aspirin enteric coated 325 milliGRAM(s) Oral daily  atorvastatin 80 milliGRAM(s) Oral at bedtime  bisacodyl Suppository 10 milliGRAM(s) Rectal once  carbidopa/levodopa  25/100 2 Tablet(s) Oral three times a day  chlorhexidine 2% Cloths 1 Application(s) Topical <User Schedule>  dextrose 5%. 1000 milliLiter(s) (50 mL/Hr) IV Continuous <Continuous>  dextrose 50% Injectable 12.5 Gram(s) IV Push once  dextrose 50% Injectable 25 Gram(s) IV Push once  dextrose 50% Injectable 25 Gram(s) IV Push once  dextrose 50% Injectable 50 milliLiter(s) IV Push every 15 minutes  docusate sodium 100 milliGRAM(s) Oral three times a day  epoetin mari Injectable 4000 Unit(s) SubCutaneous every 7 days  ferrous    sulfate 325 milliGRAM(s) Oral daily  folic acid 1 milliGRAM(s) Oral daily  heparin  Injectable 5000 Unit(s) SubCutaneous every 8 hours  insulin glargine Injectable (LANTUS) 28 Unit(s) SubCutaneous at bedtime  insulin lispro (HumaLOG) corrective regimen sliding scale   SubCutaneous three times a day before meals  insulin lispro (HumaLOG) corrective regimen sliding scale   SubCutaneous at bedtime  insulin lispro Injectable (HumaLOG) 10 Unit(s) SubCutaneous three times a day before meals  levothyroxine 50 MICROGram(s) Oral daily  metoprolol tartrate 25 milliGRAM(s) Oral every 8 hours  pantoprazole    Tablet 40 milliGRAM(s) Oral before breakfast  polyethylene glycol 3350 17 Gram(s) Oral daily  sodium chloride 0.9% lock flush 3 milliLiter(s) IV Push every 8 hours  trihexyphenidyl 2 milliGRAM(s) Oral three times a day      VITAL:  T(C): , Max: 36.7 (10-09-19 @ 19:38)  T(F): , Max: 98 (10-09-19 @ 19:38)  HR: 70 (10-10-19 @ 05:00)  BP: 156/73 (10-10-19 @ 05:00)  BP(mean): --  RR: 18 (10-10-19 @ 05:00)  SpO2: 98% (10-10-19 @ 05:00)  Wt(kg): --    I and O's:    10-09 @ 07:01  -  10-10 @ 07:00  --------------------------------------------------------  IN: 440 mL / OUT: 4250 mL / NET: -3810 mL    10-10 @ 07:01  -  10-10 @ 09:10  --------------------------------------------------------  IN: 0 mL / OUT: 200 mL / NET: -200 mL          PHYSICAL EXAM:    Constitutional: NAD  Neck:  No JVD  Respiratory: CTAB/L  Cardiovascular: S1 and S2  Gastrointestinal: BS+, soft, NT/ND  Extremities: trace peripheral edema  Neurological: A/O x 3, no focal deficits  Psychiatric: Normal mood, normal affect  : No Javier  Skin: No rashes  Access: Not applicable    LABS:                        10.0   12.77 )-----------( 388      ( 10 Oct 2019 06:32 )             29.8     10-10    134<L>  |  92<L>  |  59<H>  ----------------------------<  86  3.9   |  28  |  3.27<H>    Ca    9.7      10 Oct 2019 06:32            Urine Studies:          RADIOLOGY & ADDITIONAL STUDIES:

## 2019-10-10 NOTE — PROGRESS NOTE ADULT - PROBLEM SELECTOR PLAN 1
Continue with  mg PO Daily.  Continue with Lopressor 25 mg PO TID.   Continue with Amio 200 mg PO daily   Continue with Lipitor 80 mg PO HS   Continue with PT/OT   Glycemic Control per Endo   Monitor Renal function   Increase activity as tolerated.   Encourage Chest PT / Pulmonary toileting and Incentive spirometry every 1 hour x 10 while awake.   Continue with PUD and DVT prophylaxis.   Shower on POD #5.   D/C plan- acute rehab Continue with  mg PO Daily.  Continue with Lopressor 25 mg PO TID.   Continue with Amio 200 mg PO daily   Continue with Lipitor 80 mg PO HS   Continue with PT/OT   Glycemic Control per Endo   Monitor Renal function   Increase activity as tolerated.   Encourage Chest PT / Pulmonary toileting and Incentive spirometry every 1 hour x 10 while awake.   Continue with PUD and DVT prophylaxis.   D/C plan- acute rehab

## 2019-10-10 NOTE — PROGRESS NOTE ADULT - ASSESSMENT
68 yo M PMHx CAD s/p stent x 1 (2010), T2DM, HTN, Parkinson's presenting with c/o 10/10 chest pain described as burning radiating from abdomen to midsternum x 1 day. Patient reports symptoms started at 2 pm and resolved after 1 hour. Chest pain began after he ate lunch and was walking up a flight of stairs. He reports chest pain was associated with dyspnea, diaphoresis, and nausea. At baseline, he ambulates with cane/walker. Takes medications daily including aspirin and prasugrel. On ROS, reports chronic left leg swelling x 1-2 years. Pt s/p cardiac cath at [facility] found to have multivessel disease requiring transfer to [facility] CT surgery service.   9/26 Nephrology consulted, aldactone discontinued. Pre op Transthoracic Echocardiogram Mitral annular calcification, otherwise normal mitral  valve. Calcified trileaflet aortic valve with normal opening. Mild aortic regurgitation. Mild concentric left ventricular hypertrophy. Endocardium not well visualized; grossly normal left ventricular systolic function.  9/27 VVS; Pre op Cardiac Surgery work up in progress. PT evaluation to assess pre op baseline activity level.  Plan for cardiac surgery in next week Wednesday with Dr. Guillory.  On 10/2/19 C5L LIMA to LAD, SVG to RPDA, SVG to RPL, SVG to Ramus, SVG to OM.   Post op Course:   Extubated on POD # 1  Pressor support required à weaned off   (+) dizziness à Neurology following à Unclear of etiology   CKD à Renal following; on Torsemide 10 mg PO daily    Hyperglycemia / Hypothyroid à Endo following. Continue on Synthroid 50 mcg PO daily.   10/7 Transferred to 2 [facility].   10/8 VSS - SR 58 - pt offers no complaints- alex patent, d/c plan to rehab  10/9 HD stable. Pt yet to have BM post-op-given mag citrate this PM.   Javier d/c'd today. Voiding well post-removal.   Discharge planning-*change* re-eval-acute rehab.   10/10: HD stable. CXR-small left pleural effusion-encouraging improved pulm toilet.  Voiding well since Javier dc'd yesterday.  BM x3 overnight after mag citrate yesterday PM. 70 yo M PMHx CAD s/p stent x 1 (2010), T2DM, HTN, Parkinson's presenting with c/o 10/10 chest pain described as burning radiating from abdomen to midsternum x 1 day. Patient reports symptoms started at 2 pm and resolved after 1 hour. Chest pain began after he ate lunch and was walking up a flight of stairs. He reports chest pain was associated with dyspnea, diaphoresis, and nausea. At baseline, he ambulates with cane/walker. Takes medications daily including aspirin and prasugrel. On ROS, reports chronic left leg swelling x 1-2 years. Pt s/p cardiac cath at [facility] found to have multivessel disease requiring transfer to [facility] CT surgery service.   9/26 Nephrology consulted, aldactone discontinued. Pre op Transthoracic Echocardiogram Mitral annular calcification, otherwise normal mitral  valve. Calcified trileaflet aortic valve with normal opening. Mild aortic regurgitation. Mild concentric left ventricular hypertrophy. Endocardium not well visualized; grossly normal left ventricular systolic function.  9/27 VVS; Pre op Cardiac Surgery work up in progress. PT evaluation to assess pre op baseline activity level.  Plan for cardiac surgery in next week Wednesday with Dr. Guillory.  On 10/2/19 C5L LIMA to LAD, SVG to RPDA, SVG to RPL, SVG to Ramus, SVG to OM.   Post op Course:   Extubated on POD # 1  Pressor support required à weaned off   (+) dizziness à Neurology following à Unclear of etiology   CKD à Renal following; on Torsemide 10 mg PO daily    Hyperglycemia / Hypothyroid à Endo following. Continue on Synthroid 50 mcg PO daily.   10/7 Transferred to 2 [facility].   10/8 VSS - SR 58 - pt offers no complaints- alex patent, d/c plan to rehab  10/9 HD stable. Pt yet to have BM post-op-given mag citrate this PM.   Javier d/c'd today. Voiding well post-removal.   Discharge planning-*change* re-eval-acute rehab.   10/10: HD stable. CXR today-small left pleural effusion-encouraging improved pulm toilet.  Voiding well since Javier dc'd yesterday.  Stable BUN/Creat  BM x3 overnight after mag citrate yesterday PM.   Discharge planning-acute rehab.

## 2019-10-10 NOTE — PROGRESS NOTE ADULT - ASSESSMENT
68 yo M PMHx CAD s/p stent x 1 (2010), T2DM, HTN, Parkinson's and CKD stage 3b presents with chest pain. S/P cath with MVD- POD #8  CABG  Subnephrotic range proteinuria     1. Renal - [id number].  Trend BMP and urine output- ABBY is better as is the edema    2. CVS   BP is stable     3.. Hyponatremia- hypervolemic     4. Anemia s/p Epogen     Recommendations  - Trend renal profile which is improving and the edema is much better  - johnson is out and no issue with urination      - Fluid restriction 1.2 L per day   - Creatinine improving       Subacute rehab      Outpatient follow in the office once he leaves rehab

## 2019-10-10 NOTE — PROGRESS NOTE ADULT - PROBLEM SELECTOR PLAN 1
Will continue current insulin regimen for now. Will continue monitoring FS, log, will Follow up.  Patient counseled for compliance with consistent low carb diet. Will continue current insulin regimen for now. Will continue monitoring FS, log, will Follow up.

## 2019-10-10 NOTE — PROGRESS NOTE ADULT - SUBJECTIVE AND OBJECTIVE BOX
Subjective: Pt states "I feel rotten." Endorses pain at incision site, denies any SOB, N/V and palpitations. No acute events overnight.     VITAL SIGNS  Telemetry: NSR 62 bpm   Vital Signs Last 24 Hrs  T(C): 36.3 (10-10-19 @ 05:00), Max: 36.7 (10-09-19 @ 19:38)  T(F): 97.4 (10-10-19 @ 05:00), Max: 98 (10-09-19 @ 19:38)  HR: 70 (10-10-19 @ 05:00) (66 - 70)  BP: 156/73 (10-10-19 @ 05:00) (124/70 - 156/73)  RR: 18 (10-10-19 @ 05:00) (18 - 18)  SpO2: 98% (10-10-19 @ 05:00) (96% - 98%)            10-09 @ 07:01  -  10-10 @ 07:00  --------------------------------------------------------  IN: 440 mL / OUT: 4250 mL / NET: -3810 mL    10-10 @ 07:01  -  10-10 @ 12:32  --------------------------------------------------------  IN: 120 mL / OUT: 200 mL / NET: -80 mL    CAPILLARY BLOOD GLUCOSE  POCT Blood Glucose.: 96 mg/dL (10 Oct 2019 07:42)  POCT Blood Glucose.: 106 mg/dL (09 Oct 2019 22:03)  POCT Blood Glucose.: 136 mg/dL (09 Oct 2019 16:52)  POCT Blood Glucose.: 107 mg/dL (09 Oct 2019 12:37)        MEDICATIONS  acetaminophen   Tablet .. 325 milliGRAM(s) Oral once  amiodarone    Tablet 200 milliGRAM(s) Oral daily  aspirin enteric coated 325 milliGRAM(s) Oral daily  atorvastatin 80 milliGRAM(s) Oral at bedtime  bisacodyl Suppository 10 milliGRAM(s) Rectal once  carbidopa/levodopa  25/100 2 Tablet(s) Oral three times a day  chlorhexidine 2% Cloths 1 Application(s) Topical <User Schedule>  dextrose 40% Gel 15 Gram(s) Oral once PRN  dextrose 5%. 1000 milliLiter(s) IV Continuous <Continuous>  dextrose 50% Injectable 12.5 Gram(s) IV Push once  dextrose 50% Injectable 25 Gram(s) IV Push once  dextrose 50% Injectable 25 Gram(s) IV Push once  dextrose 50% Injectable 50 milliLiter(s) IV Push every 15 minutes  docusate sodium 100 milliGRAM(s) Oral three times a day  epoetin mari Injectable 4000 Unit(s) SubCutaneous every 7 days  ferrous    sulfate 325 milliGRAM(s) Oral daily  folic acid 1 milliGRAM(s) Oral daily  glucagon  Injectable 1 milliGRAM(s) IntraMuscular once PRN  heparin  Injectable 5000 Unit(s) SubCutaneous every 8 hours  insulin glargine Injectable (LANTUS) 28 Unit(s) SubCutaneous at bedtime  insulin lispro (HumaLOG) corrective regimen sliding scale   SubCutaneous three times a day before meals  insulin lispro (HumaLOG) corrective regimen sliding scale   SubCutaneous at bedtime  insulin lispro Injectable (HumaLOG) 10 Unit(s) SubCutaneous three times a day before meals  levothyroxine 50 MICROGram(s) Oral daily  metoprolol tartrate 25 milliGRAM(s) Oral every 8 hours  oxyCODONE    5 mG/acetaminophen 325 mG 1 Tablet(s) Oral every 6 hours PRN  oxyCODONE    5 mG/acetaminophen 325 mG 2 Tablet(s) Oral every 6 hours PRN  pantoprazole    Tablet 40 milliGRAM(s) Oral before breakfast  polyethylene glycol 3350 17 Gram(s) Oral daily  sodium chloride 0.9% lock flush 3 milliLiter(s) IV Push every 8 hours  trihexyphenidyl 2 milliGRAM(s) Oral three times a day    PHYSICAL EXAM  Neurology: A&Ox3, nonfocal, Parkinsonian tremors  CV : RRR, +S1S2  Sternal Wound : FREDIS, CDI, Stable sternum   Lungs: Respirations non-labored, mildly diminished at left base  Abdomen: Soft, NT/ND, +BSx4Q, last BM on 10/10, (-)N/V/D  : Voiding without difficulty, bladder non-distended  Extremities: B/L LE edema, negative calf tenderness, +PP, right SVG incision-FREDIS, CDI    LABS  10-10    134<L>  |  92<L>  |  59<H>  ----------------------------<  86  3.9   |  28  |  3.27<H>    Ca    9.7      10 Oct 2019 06:32                     10.0   12.77 )-----------( 388      ( 10 Oct 2019 06:32 )             29.8          PAST MEDICAL & SURGICAL HISTORY:  Shoulder pain, left  Osteoarthritis  Panic attacks  Urinary frequency  Urinary incontinence  Nocturia: x2-3  Gastroesophageal reflux disease without esophagitis: diet controlled  Vertigo: not improved with meclizine in past  Insomnia  Autoimmune disease: started on prednisone on 12/23/15 for &quot;inflammation&quot; but does not know diagnosis  Leg swelling: left leg  CAD (coronary artery disease): s/p 1 stent in 2010  Parkinson disease  Hypercholesterolemia  Diabetes Mellitus, Type 2  Hypertension  S/P angioplasty with stent: 1 stent, RCA  Status Post Cardiac Catheterization     Physical Therapy Rec:   Home  [  ]   Home w/ PT  [  ]  Rehab  [ X ]    Discussed with CT Surgery attending.

## 2019-10-10 NOTE — PROGRESS NOTE ADULT - PROBLEM SELECTOR PLAN 4
Renal following   Monitor renal function   Torsemide d/c'd 10/8  Javier d/c'd 10/9-voiding well post-removal

## 2019-10-10 NOTE — PROGRESS NOTE ADULT - SUBJECTIVE AND OBJECTIVE BOX
S: Feels tired. Reports incisional pain is less. Denies chest pain, SOB at rest, palpitations or dizziness. Review of systems otherwise (-)      MEDICATIONS  (STANDING):  acetaminophen   Tablet .. 325 milliGRAM(s) Oral once  amiodarone    Tablet 200 milliGRAM(s) Oral daily  aspirin enteric coated 325 milliGRAM(s) Oral daily  atorvastatin 80 milliGRAM(s) Oral at bedtime  bisacodyl Suppository 10 milliGRAM(s) Rectal once  carbidopa/levodopa  25/100 2 Tablet(s) Oral three times a day  chlorhexidine 2% Cloths 1 Application(s) Topical <User Schedule>  dextrose 5%. 1000 milliLiter(s) (50 mL/Hr) IV Continuous <Continuous>  dextrose 50% Injectable 12.5 Gram(s) IV Push once  dextrose 50% Injectable 25 Gram(s) IV Push once  dextrose 50% Injectable 25 Gram(s) IV Push once  dextrose 50% Injectable 50 milliLiter(s) IV Push every 15 minutes  docusate sodium 100 milliGRAM(s) Oral three times a day  epoetin mari Injectable 4000 Unit(s) SubCutaneous every 7 days  ferrous    sulfate 325 milliGRAM(s) Oral daily  folic acid 1 milliGRAM(s) Oral daily  heparin  Injectable 5000 Unit(s) SubCutaneous every 8 hours  insulin glargine Injectable (LANTUS) 28 Unit(s) SubCutaneous at bedtime  insulin lispro (HumaLOG) corrective regimen sliding scale   SubCutaneous three times a day before meals  insulin lispro (HumaLOG) corrective regimen sliding scale   SubCutaneous at bedtime  insulin lispro Injectable (HumaLOG) 10 Unit(s) SubCutaneous three times a day before meals  levothyroxine 50 MICROGram(s) Oral daily  metoprolol tartrate 25 milliGRAM(s) Oral every 8 hours  pantoprazole    Tablet 40 milliGRAM(s) Oral before breakfast  polyethylene glycol 3350 17 Gram(s) Oral daily  sodium chloride 0.9% lock flush 3 milliLiter(s) IV Push every 8 hours  trihexyphenidyl 2 milliGRAM(s) Oral three times a day    MEDICATIONS  (PRN):  dextrose 40% Gel 15 Gram(s) Oral once PRN Blood Glucose LESS THAN 70 milliGRAM(s)/deciliter  glucagon  Injectable 1 milliGRAM(s) IntraMuscular once PRN Glucose LESS THAN 70 milligrams/deciliter  oxyCODONE    5 mG/acetaminophen 325 mG 1 Tablet(s) Oral every 6 hours PRN Moderate Pain (4 - 6)  oxyCODONE    5 mG/acetaminophen 325 mG 2 Tablet(s) Oral every 6 hours PRN Severe Pain (7 - 10)      LABS:                            10.0   12.77 )-----------( 388      ( 10 Oct 2019 06:32 )             29.8     Hemoglobin: 10.0 g/dL (10-10 @ 06:32)  Hemoglobin: 9.5 g/dL (10-09 @ 05:50)  Hemoglobin: 9.1 g/dL (10-08 @ 07:28)  Hemoglobin: 8.8 g/dL (10-07 @ 00:44)  Hemoglobin: 9.6 g/dL (10-06 @ 01:08)    10-10    134<L>  |  92<L>  |  59<H>  ----------------------------<  86  3.9   |  28  |  3.27<H>    Ca    9.7      10 Oct 2019 06:32      Creatinine Trend: 3.27<--, 3.32<--, 3.44<--, 3.13<--, 3.07<--, 3.07<--             10-09-19 @ 07:01  -  10-10-19 @ 07:00  --------------------------------------------------------  IN: 440 mL / OUT: 4250 mL / NET: -3810 mL    10-10-19 @ 07:01  -  10-10-19 @ 16:44  --------------------------------------------------------  IN: 320 mL / OUT: 400 mL / NET: -80 mL        PHYSICAL EXAM  Vital Signs Last 24 Hrs  T(C): 36.6 (10 Oct 2019 13:50), Max: 36.7 (09 Oct 2019 19:38)  T(F): 97.9 (10 Oct 2019 13:50), Max: 98 (09 Oct 2019 19:38)  HR: 79 (10 Oct 2019 13:50) (66 - 79)  BP: 134/70 (10 Oct 2019 13:50) (124/70 - 156/73)  BP(mean): --  RR: 18 (10 Oct 2019 13:50) (18 - 18)  SpO2: 95% (10 Oct 2019 13:50) (95% - 98%)      Gen: Appears well in NAD  HEENT:  (-)icterus (-)pallor  CV: N S1 S2 1/6 JENNIFER (+)2 Pulses B/l  Resp:  Clear to auscultation B/L, normal effort  GI: (+) BS Soft, NT, ND  Lymph:  (+) B/L LE Edema, (-)obvious lymphadenopathy  Skin: Warm to touch, Normal turgor  Psych: Appropriate mood and affect      TELEMETRY: SB/SR 50-60s        DIAGNOSTIC TESTING:  [ ] Echocardiogram: < from: Intra-Operative Transesophageal Echo (10.02.19 @ 13:30) >  Conclusions:  1. Mild segmental left ventricular systolic dysfunction.  Apicl hypokinesis  2. Moderate diastolic dysfunction (Stage II).  Confirmed on  10/2/2019 - 14:16:07 by Daniel Duran M.D.  ------------------------------------------------------------------------    < end of copied text >      ASSESSMENT/PLAN:   68 yo M PMHx CAD s/p stent x 1 (2010), T2DM, HTN, Parkinson's, history of meningioma, admitted with NSTEMI, TTE with mild segmental LV dysfxn, cath with multivessel CAD, s/p C5L LIMA-LAD, SVG-RPDA, SVG-RPL, SVG-Ramus, SVG-OM 10/2    - Monitor tele - remains sinus  - Continue medical management of CAD with ASA, statin   - Agree with short term amio for post-op Afib  - A/C per CTS   - GI / DVT prophylaxis.  - keep K>4, mag >2.0   - Tolerating BB  - Renal f/u - trend creatinine, johnson removed  - Supportive care per CTS appreciated  - D/C planning rehab per primary team  - Patient to f/u with his cardiologist Dr. Wagner Simons after discharge    Yunier Quiroz PA-C  [facility]  Pager: [phone]

## 2019-10-10 NOTE — PROGRESS NOTE ADULT - PROBLEM SELECTOR PLAN 2
Will continue Synthroid 50 mcg; Will order TFTs for tomorrow AM and FU.  Suggest to repeat TFTs in 6 weeks.

## 2019-10-11 LAB
ANION GAP SERPL CALC-SCNC: 12 MMOL/L — SIGNIFICANT CHANGE UP (ref 5–17)
BUN SERPL-MCNC: 57 MG/DL — HIGH (ref 7–23)
CALCIUM SERPL-MCNC: 9.1 MG/DL — SIGNIFICANT CHANGE UP (ref 8.4–10.5)
CHLORIDE SERPL-SCNC: 93 MMOL/L — LOW (ref 96–108)
CO2 SERPL-SCNC: 29 MMOL/L — SIGNIFICANT CHANGE UP (ref 22–31)
CREAT SERPL-MCNC: 3.56 MG/DL — HIGH (ref 0.5–1.3)
GLUCOSE SERPL-MCNC: 141 MG/DL — HIGH (ref 70–99)
HCT VFR BLD CALC: 30 % — LOW (ref 39–50)
HGB BLD-MCNC: 10 G/DL — LOW (ref 13–17)
MCHC RBC-ENTMCNC: 30.4 PG — SIGNIFICANT CHANGE UP (ref 27–34)
MCHC RBC-ENTMCNC: 33.3 GM/DL — SIGNIFICANT CHANGE UP (ref 32–36)
MCV RBC AUTO: 91.2 FL — SIGNIFICANT CHANGE UP (ref 80–100)
NRBC # BLD: 0 /100 WBCS — SIGNIFICANT CHANGE UP (ref 0–0)
PLATELET # BLD AUTO: 397 K/UL — SIGNIFICANT CHANGE UP (ref 150–400)
POTASSIUM SERPL-MCNC: 4.3 MMOL/L — SIGNIFICANT CHANGE UP (ref 3.5–5.3)
POTASSIUM SERPL-SCNC: 4.3 MMOL/L — SIGNIFICANT CHANGE UP (ref 3.5–5.3)
RBC # BLD: 3.29 M/UL — LOW (ref 4.2–5.8)
RBC # FLD: 14.6 % — HIGH (ref 10.3–14.5)
SODIUM SERPL-SCNC: 134 MMOL/L — LOW (ref 135–145)
T4 FREE SERPL-MCNC: 1.5 NG/DL — SIGNIFICANT CHANGE UP (ref 0.9–1.8)
TSH SERPL-MCNC: 4.53 UIU/ML — HIGH (ref 0.27–4.2)
WBC # BLD: 11.51 K/UL — HIGH (ref 3.8–10.5)
WBC # FLD AUTO: 11.51 K/UL — HIGH (ref 3.8–10.5)

## 2019-10-11 RX ORDER — INSULIN LISPRO 100/ML
7 VIAL (ML) SUBCUTANEOUS
Refills: 0 | Status: DISCONTINUED | OUTPATIENT
Start: 2019-10-11 | End: 2019-10-13

## 2019-10-11 RX ORDER — INSULIN GLARGINE 100 [IU]/ML
18 INJECTION, SOLUTION SUBCUTANEOUS AT BEDTIME
Refills: 0 | Status: DISCONTINUED | OUTPATIENT
Start: 2019-10-11 | End: 2019-10-13

## 2019-10-11 RX ADMIN — HEPARIN SODIUM 5000 UNIT(S): 5000 INJECTION INTRAVENOUS; SUBCUTANEOUS at 06:26

## 2019-10-11 RX ADMIN — HEPARIN SODIUM 5000 UNIT(S): 5000 INJECTION INTRAVENOUS; SUBCUTANEOUS at 21:44

## 2019-10-11 RX ADMIN — POLYETHYLENE GLYCOL 3350 17 GRAM(S): 17 POWDER, FOR SOLUTION ORAL at 12:22

## 2019-10-11 RX ADMIN — Medication 25 MILLIGRAM(S): at 06:26

## 2019-10-11 RX ADMIN — Medication 10 UNIT(S): at 08:17

## 2019-10-11 RX ADMIN — Medication 2: at 17:19

## 2019-10-11 RX ADMIN — Medication 2 MILLIGRAM(S): at 21:44

## 2019-10-11 RX ADMIN — SODIUM CHLORIDE 3 MILLILITER(S): 9 INJECTION INTRAMUSCULAR; INTRAVENOUS; SUBCUTANEOUS at 06:30

## 2019-10-11 RX ADMIN — Medication 25 MILLIGRAM(S): at 14:09

## 2019-10-11 RX ADMIN — CARBIDOPA AND LEVODOPA 2 TABLET(S): 25; 100 TABLET ORAL at 14:08

## 2019-10-11 RX ADMIN — Medication 100 MILLIGRAM(S): at 14:08

## 2019-10-11 RX ADMIN — Medication 2 MILLIGRAM(S): at 14:09

## 2019-10-11 RX ADMIN — Medication 25 MILLIGRAM(S): at 21:44

## 2019-10-11 RX ADMIN — Medication 2: at 08:17

## 2019-10-11 RX ADMIN — HEPARIN SODIUM 5000 UNIT(S): 5000 INJECTION INTRAVENOUS; SUBCUTANEOUS at 14:10

## 2019-10-11 RX ADMIN — Medication 100 MILLIGRAM(S): at 06:26

## 2019-10-11 RX ADMIN — Medication 325 MILLIGRAM(S): at 12:22

## 2019-10-11 RX ADMIN — INSULIN GLARGINE 18 UNIT(S): 100 INJECTION, SOLUTION SUBCUTANEOUS at 21:45

## 2019-10-11 RX ADMIN — CARBIDOPA AND LEVODOPA 2 TABLET(S): 25; 100 TABLET ORAL at 21:44

## 2019-10-11 RX ADMIN — SODIUM CHLORIDE 3 MILLILITER(S): 9 INJECTION INTRAMUSCULAR; INTRAVENOUS; SUBCUTANEOUS at 21:46

## 2019-10-11 RX ADMIN — CARBIDOPA AND LEVODOPA 2 TABLET(S): 25; 100 TABLET ORAL at 06:26

## 2019-10-11 RX ADMIN — Medication 2 MILLIGRAM(S): at 06:26

## 2019-10-11 RX ADMIN — Medication 50 MICROGRAM(S): at 06:26

## 2019-10-11 RX ADMIN — ATORVASTATIN CALCIUM 80 MILLIGRAM(S): 80 TABLET, FILM COATED ORAL at 21:44

## 2019-10-11 RX ADMIN — SODIUM CHLORIDE 3 MILLILITER(S): 9 INJECTION INTRAMUSCULAR; INTRAVENOUS; SUBCUTANEOUS at 14:11

## 2019-10-11 RX ADMIN — PANTOPRAZOLE SODIUM 40 MILLIGRAM(S): 20 TABLET, DELAYED RELEASE ORAL at 06:26

## 2019-10-11 RX ADMIN — AMIODARONE HYDROCHLORIDE 200 MILLIGRAM(S): 400 TABLET ORAL at 06:26

## 2019-10-11 RX ADMIN — Medication 7 UNIT(S): at 17:20

## 2019-10-11 RX ADMIN — Medication 1 MILLIGRAM(S): at 12:23

## 2019-10-11 NOTE — PROGRESS NOTE ADULT - ASSESSMENT
68 yo M PMHx CAD s/p stent x 1 (2010), T2DM, HTN, Parkinson's presenting with c/o 10/10 chest pain described as burning radiating from abdomen to midsternum x 1 day. Patient reports symptoms started at 2 pm and resolved after 1 hour. Chest pain began after he ate lunch and was walking up a flight of stairs. He reports chest pain was associated with dyspnea, diaphoresis, and nausea. At baseline, he ambulates with cane/walker. Takes medications daily including aspirin and prasugrel. On ROS, reports chronic left leg swelling x 1-2 years. Pt s/p cardiac cath at [facility] found to have multivessel disease requiring transfer to [facility] CT surgery service.   9/26 Nephrology consulted, aldactone discontinued. Pre op Transthoracic Echocardiogram Mitral annular calcification, otherwise normal mitral  valve. Calcified trileaflet aortic valve with normal opening. Mild aortic regurgitation. Mild concentric left ventricular hypertrophy. Endocardium not well visualized; grossly normal left ventricular systolic function.  9/27 VVS; Pre op Cardiac Surgery work up in progress. PT evaluation to assess pre op baseline activity level.  Plan for cardiac surgery in next week Wednesday with Dr. Guillory.  On 10/2/19 C5L LIMA to LAD, SVG to RPDA, SVG to RPL, SVG to Ramus, SVG to OM.   Post op Course:   Extubated on POD # 1  Pressor support required à weaned off   (+) dizziness à Neurology following à Unclear of etiology   CKD à Renal following; on Torsemide 10 mg PO daily    Hyperglycemia / Hypothyroid à Endo following. Continue on Synthroid 50 mcg PO daily.   10/7 Transferred to 2 [facility].   10/8 VSS - SR 58 - pt offers no complaints- johnson patent, d/c plan to rehab  10/9 HD stable. Pt yet to have BM post-op-given mag citrate this PM.   Johnson d/c'd today. Voiding well post-removal.   Discharge planning-*change* re-eval-acute rehab.   10/10: HD stable. CXR today-small left pleural effusion-encouraging improved pulm toilet.  Voiding well since Johnson dc'd yesterday.  Stable BUN/Creat  BM x3 overnight after mag citrate yesterday PM.   Discharge planning-acute rehab.   10/11 VSS no events overnight awaiting PMR for dispo recs

## 2019-10-11 NOTE — PROGRESS NOTE ADULT - SUBJECTIVE AND OBJECTIVE BOX
S: Feels better today. Has intermittent dizziness. Denies chest pain, SOB at rest, or palpitations. Review of systems otherwise (-)      MEDICATIONS  (STANDING):  acetaminophen   Tablet .. 325 milliGRAM(s) Oral once  amiodarone    Tablet 200 milliGRAM(s) Oral daily  aspirin enteric coated 325 milliGRAM(s) Oral daily  atorvastatin 80 milliGRAM(s) Oral at bedtime  bisacodyl Suppository 10 milliGRAM(s) Rectal once  carbidopa/levodopa  25/100 2 Tablet(s) Oral three times a day  chlorhexidine 2% Cloths 1 Application(s) Topical <User Schedule>  dextrose 5%. 1000 milliLiter(s) (50 mL/Hr) IV Continuous <Continuous>  dextrose 50% Injectable 12.5 Gram(s) IV Push once  dextrose 50% Injectable 25 Gram(s) IV Push once  dextrose 50% Injectable 25 Gram(s) IV Push once  dextrose 50% Injectable 50 milliLiter(s) IV Push every 15 minutes  docusate sodium 100 milliGRAM(s) Oral three times a day  epoetin mari Injectable 4000 Unit(s) SubCutaneous every 7 days  ferrous    sulfate 325 milliGRAM(s) Oral daily  folic acid 1 milliGRAM(s) Oral daily  heparin  Injectable 5000 Unit(s) SubCutaneous every 8 hours  insulin glargine Injectable (LANTUS) 18 Unit(s) SubCutaneous at bedtime  insulin lispro (HumaLOG) corrective regimen sliding scale   SubCutaneous three times a day before meals  insulin lispro (HumaLOG) corrective regimen sliding scale   SubCutaneous at bedtime  insulin lispro Injectable (HumaLOG) 7 Unit(s) SubCutaneous three times a day before meals  levothyroxine 50 MICROGram(s) Oral daily  metoprolol tartrate 25 milliGRAM(s) Oral every 8 hours  pantoprazole    Tablet 40 milliGRAM(s) Oral before breakfast  polyethylene glycol 3350 17 Gram(s) Oral daily  sodium chloride 0.9% lock flush 3 milliLiter(s) IV Push every 8 hours  trihexyphenidyl 2 milliGRAM(s) Oral three times a day    MEDICATIONS  (PRN):  dextrose 40% Gel 15 Gram(s) Oral once PRN Blood Glucose LESS THAN 70 milliGRAM(s)/deciliter  glucagon  Injectable 1 milliGRAM(s) IntraMuscular once PRN Glucose LESS THAN 70 milligrams/deciliter      LABS:                            10.0   11.51 )-----------( 397      ( 11 Oct 2019 06:34 )             30.0     Hemoglobin: 10.0 g/dL (10-11 @ 06:34)  Hemoglobin: 10.0 g/dL (10-10 @ 06:32)  Hemoglobin: 9.5 g/dL (10-09 @ 05:50)  Hemoglobin: 9.1 g/dL (10-08 @ 07:28)  Hemoglobin: 8.8 g/dL (10-07 @ 00:44)    10-11    134<L>  |  93<L>  |  57<H>  ----------------------------<  141<H>  4.3   |  29  |  3.56<H>    Ca    9.1      11 Oct 2019 06:34      Creatinine Trend: 3.56<--, 3.27<--, 3.32<--, 3.44<--, 3.13<--, 3.07<--       10-10-19 @ 07:01  -  10-11-19 @ 07:00  --------------------------------------------------------  IN: 520 mL / OUT: 750 mL / NET: -230 mL    10-11-19 @ 07:01  -  10-11-19 @ 13:46  --------------------------------------------------------  IN: 600 mL / OUT: 300 mL / NET: 300 mL        PHYSICAL EXAM  Vital Signs Last 24 Hrs  T(C): 36.8 (11 Oct 2019 13:36), Max: 36.8 (11 Oct 2019 13:36)  T(F): 98.2 (11 Oct 2019 13:36), Max: 98.2 (11 Oct 2019 13:36)  HR: 64 (11 Oct 2019 13:36) (61 - 79)  BP: 136/76 (11 Oct 2019 13:36) (117/73 - 136/76)  BP(mean): --  RR: 18 (11 Oct 2019 13:36) (18 - 18)  SpO2: 95% (11 Oct 2019 13:36) (95% - 99%)      Gen: Appears well in NAD  HEENT:  (-)icterus (-)pallor  CV: N S1 S2 1/6 JENNIFER (+)2 Pulses B/l  Resp:  Clear to auscultation B/L, normal effort  GI: (+) BS Soft, NT, ND  Lymph:  (+) B/L LE Edema, (-)obvious lymphadenopathy  Skin: Warm to touch, Normal turgor  Psych: Appropriate mood and affect      TELEMETRY: SB/SR 50-70        DIAGNOSTIC TESTING:  [ ] Echocardiogram: < from: Intra-Operative Transesophageal Echo (10.02.19 @ 13:30) >  Conclusions:  1. Mild segmental left ventricular systolic dysfunction.  Apicl hypokinesis  2. Moderate diastolic dysfunction (Stage II).  Confirmed on  10/2/2019 - 14:16:07 by Daniel Duran M.D.  ------------------------------------------------------------------------    < end of copied text >      ASSESSMENT/PLAN:   68 yo M PMHx CAD s/p stent x 1 (2010), T2DM, HTN, Parkinson's, history of meningioma, admitted with NSTEMI, TTE with mild segmental LV dysfxn, cath with multivessel CAD, s/p C5L LIMA-LAD, SVG-RPDA, SVG-RPL, SVG-Ramus, SVG-OM 10/2    - Monitor tele - remains sinus  - Continue medical management of CAD with ASA, statin   - Agree with short term amio for post-op Afib  - A/C per CTS   - GI / DVT prophylaxis  - keep K>4, mg >2.0   - Tolerating BB  - Renal f/u - trend creatinine  - Diuretics per renal  - Supportive care per CTS appreciated  - D/C planning rehab per primary team  - Patient to f/u with his cardiologist Dr. Wagner Simons after discharge    Yunier Quiroz PA-C  [facility]  Pager: [phone]

## 2019-10-11 NOTE — PROGRESS NOTE ADULT - ASSESSMENT
70 yo M PMHx CAD s/p stent x 1 (2010), T2DM, HTN, Parkinson's and CKD stage 3b presents with chest pain. S/P cath with MVD- POD #9  CABG  Subnephrotic range proteinuria     1. Renal - [id number].  Trend BMP and urine output-     2. CVS   BP is stable     3.. Hyponatremia- hypervolemic     4. Anemia s/p Epogen     Recommendations  - Trend renal profile and the edema is much better  - johnson is out and no issue with urination      - Fluid restriction 1.2 L per day   - Off diuretics and in 3-4 days can resume lasix 40mg po qd       Subacute rehab      Outpatient follow in the office once he leaves rehab 68 yo M PMHx CAD s/p stent x 1 (2010), T2DM, HTN, Parkinson's and CKD stage 3b presents with chest pain. S/P cath with MVD- POD #9  CABG  Subnephrotic range proteinuria     1. Renal - [id number].  Trend BMP and urine output-     2. CVS   BP is stable     3.. Hyponatremia- hypervolemic     4. Anemia s/p Epogen     Recommendations  - Trend renal profile.  - johnson is out and no issue with urination      - Fluid restriction 1.2 L per day   - Off diuretics and in 1-2 days can resume lasix 40mg po qd   -Epogen 10000 x 1    Subacute rehab      Outpatient follow in the office once he leaves rehab

## 2019-10-11 NOTE — PROGRESS NOTE ADULT - SUBJECTIVE AND OBJECTIVE BOX
Chief complaint  Patient is a 69y old  Male who presents with a chief complaint of Multivessel disease (11 Oct 2019 12:23)   Review of systems  Patient in bed, looks comfortable, no fever,  had hypoglycemia.    Labs and Fingersticks  CAPILLARY BLOOD GLUCOSE      POCT Blood Glucose.: 132 mg/dL (11 Oct 2019 12:09)  POCT Blood Glucose.: 156 mg/dL (11 Oct 2019 07:55)  POCT Blood Glucose.: 104 mg/dL (10 Oct 2019 23:40)  POCT Blood Glucose.: 87 mg/dL (10 Oct 2019 23:21)  POCT Blood Glucose.: 83 mg/dL (10 Oct 2019 22:57)  POCT Blood Glucose.: 67 mg/dL (10 Oct 2019 22:38)  POCT Blood Glucose.: 68 mg/dL (10 Oct 2019 22:09)  POCT Blood Glucose.: 76 mg/dL (10 Oct 2019 21:49)  POCT Blood Glucose.: 104 mg/dL (10 Oct 2019 17:09)      Anion Gap, Serum: 12 (10-11 @ 06:34)  Anion Gap, Serum: 14 (10-10 @ 06:32)      Calcium, Total Serum: 9.1 (10-11 @ 06:34)  Calcium, Total Serum: 9.7 (10-10 @ 06:32)          10-11    134<L>  |  93<L>  |  57<H>  ----------------------------<  141<H>  4.3   |  29  |  3.56<H>    Ca    9.1      11 Oct 2019 06:34                          10.0   11.51 )-----------( 397      ( 11 Oct 2019 06:34 )             30.0     Medications  MEDICATIONS  (STANDING):  acetaminophen   Tablet .. 325 milliGRAM(s) Oral once  amiodarone    Tablet 200 milliGRAM(s) Oral daily  aspirin enteric coated 325 milliGRAM(s) Oral daily  atorvastatin 80 milliGRAM(s) Oral at bedtime  bisacodyl Suppository 10 milliGRAM(s) Rectal once  carbidopa/levodopa  25/100 2 Tablet(s) Oral three times a day  chlorhexidine 2% Cloths 1 Application(s) Topical <User Schedule>  dextrose 5%. 1000 milliLiter(s) (50 mL/Hr) IV Continuous <Continuous>  dextrose 50% Injectable 12.5 Gram(s) IV Push once  dextrose 50% Injectable 25 Gram(s) IV Push once  dextrose 50% Injectable 25 Gram(s) IV Push once  dextrose 50% Injectable 50 milliLiter(s) IV Push every 15 minutes  docusate sodium 100 milliGRAM(s) Oral three times a day  epoetin mari Injectable 4000 Unit(s) SubCutaneous every 7 days  ferrous    sulfate 325 milliGRAM(s) Oral daily  folic acid 1 milliGRAM(s) Oral daily  heparin  Injectable 5000 Unit(s) SubCutaneous every 8 hours  insulin glargine Injectable (LANTUS) 18 Unit(s) SubCutaneous at bedtime  insulin lispro (HumaLOG) corrective regimen sliding scale   SubCutaneous three times a day before meals  insulin lispro (HumaLOG) corrective regimen sliding scale   SubCutaneous at bedtime  insulin lispro Injectable (HumaLOG) 7 Unit(s) SubCutaneous three times a day before meals  levothyroxine 50 MICROGram(s) Oral daily  metoprolol tartrate 25 milliGRAM(s) Oral every 8 hours  pantoprazole    Tablet 40 milliGRAM(s) Oral before breakfast  polyethylene glycol 3350 17 Gram(s) Oral daily  sodium chloride 0.9% lock flush 3 milliLiter(s) IV Push every 8 hours  trihexyphenidyl 2 milliGRAM(s) Oral three times a day      Physical Exam  General: Patient comfortable in bed  Vital Signs Last 12 Hrs  T(F): 98 (10-11-19 @ 06:23), Max: 98 (10-11-19 @ 06:23)  HR: 61 (10-11-19 @ 06:23) (61 - 61)  BP: 131/67 (10-11-19 @ 06:23) (131/67 - 131/67)  BP(mean): --  RR: 18 (10-11-19 @ 06:23) (18 - 18)  SpO2: 99% (10-11-19 @ 06:23) (99% - 99%)  Neck: No palpable thyroid nodules.  CVS: S1S2, No murmurs  Respiratory: No wheezing, no crepitations  GI: Abdomen soft, bowel sounds positive  Musculoskeletal:  edema lower extremities.   Skin: No skin rashes, no ecchymosis    Diagnostics    Free Thyroxine, Serum: AM Sched. Collection: 30-Sep-2019 06:00 (09-29 @ 10:47)  Free Thyroxine, Serum: AM Sched. Collection: 11-Oct-2019 06:00 (10-10 @ 12:19)  Thyroid Stimulating Hormone, Serum: AM Sched. Collection: 11-Oct-2019 06:00 (10-10 @ 12:19)  Cortisol AM, Serum: Routine (10-10 @ 12:19) Chief complaint  Patient is a 69y old  Male who presents with a chief complaint of Multivessel disease (11 Oct 2019 12:23)   Review of systems  Patient in bed, looks comfortable, no fever,   had hypoglycemia.    Labs and Fingersticks  CAPILLARY BLOOD GLUCOSE      POCT Blood Glucose.: 132 mg/dL (11 Oct 2019 12:09)  POCT Blood Glucose.: 156 mg/dL (11 Oct 2019 07:55)  POCT Blood Glucose.: 104 mg/dL (10 Oct 2019 23:40)  POCT Blood Glucose.: 87 mg/dL (10 Oct 2019 23:21)  POCT Blood Glucose.: 83 mg/dL (10 Oct 2019 22:57)  POCT Blood Glucose.: 67 mg/dL (10 Oct 2019 22:38)  POCT Blood Glucose.: 68 mg/dL (10 Oct 2019 22:09)  POCT Blood Glucose.: 76 mg/dL (10 Oct 2019 21:49)  POCT Blood Glucose.: 104 mg/dL (10 Oct 2019 17:09)      Anion Gap, Serum: 12 (10-11 @ 06:34)  Anion Gap, Serum: 14 (10-10 @ 06:32)      Calcium, Total Serum: 9.1 (10-11 @ 06:34)  Calcium, Total Serum: 9.7 (10-10 @ 06:32)          10-11    134<L>  |  93<L>  |  57<H>  ----------------------------<  141<H>  4.3   |  29  |  3.56<H>    Ca    9.1      11 Oct 2019 06:34                          10.0   11.51 )-----------( 397      ( 11 Oct 2019 06:34 )             30.0     Medications  MEDICATIONS  (STANDING):  acetaminophen   Tablet .. 325 milliGRAM(s) Oral once  amiodarone    Tablet 200 milliGRAM(s) Oral daily  aspirin enteric coated 325 milliGRAM(s) Oral daily  atorvastatin 80 milliGRAM(s) Oral at bedtime  bisacodyl Suppository 10 milliGRAM(s) Rectal once  carbidopa/levodopa  25/100 2 Tablet(s) Oral three times a day  chlorhexidine 2% Cloths 1 Application(s) Topical <User Schedule>  dextrose 5%. 1000 milliLiter(s) (50 mL/Hr) IV Continuous <Continuous>  dextrose 50% Injectable 12.5 Gram(s) IV Push once  dextrose 50% Injectable 25 Gram(s) IV Push once  dextrose 50% Injectable 25 Gram(s) IV Push once  dextrose 50% Injectable 50 milliLiter(s) IV Push every 15 minutes  docusate sodium 100 milliGRAM(s) Oral three times a day  epoetin mari Injectable 4000 Unit(s) SubCutaneous every 7 days  ferrous    sulfate 325 milliGRAM(s) Oral daily  folic acid 1 milliGRAM(s) Oral daily  heparin  Injectable 5000 Unit(s) SubCutaneous every 8 hours  insulin glargine Injectable (LANTUS) 18 Unit(s) SubCutaneous at bedtime  insulin lispro (HumaLOG) corrective regimen sliding scale   SubCutaneous three times a day before meals  insulin lispro (HumaLOG) corrective regimen sliding scale   SubCutaneous at bedtime  insulin lispro Injectable (HumaLOG) 7 Unit(s) SubCutaneous three times a day before meals  levothyroxine 50 MICROGram(s) Oral daily  metoprolol tartrate 25 milliGRAM(s) Oral every 8 hours  pantoprazole    Tablet 40 milliGRAM(s) Oral before breakfast  polyethylene glycol 3350 17 Gram(s) Oral daily  sodium chloride 0.9% lock flush 3 milliLiter(s) IV Push every 8 hours  trihexyphenidyl 2 milliGRAM(s) Oral three times a day      Physical Exam  General: Patient comfortable in bed  Vital Signs Last 12 Hrs  T(F): 98 (10-11-19 @ 06:23), Max: 98 (10-11-19 @ 06:23)  HR: 61 (10-11-19 @ 06:23) (61 - 61)  BP: 131/67 (10-11-19 @ 06:23) (131/67 - 131/67)  BP(mean): --  RR: 18 (10-11-19 @ 06:23) (18 - 18)  SpO2: 99% (10-11-19 @ 06:23) (99% - 99%)  Neck: No palpable thyroid nodules.  CVS: S1S2, No murmurs  Respiratory: No wheezing, no crepitations  GI: Abdomen soft, bowel sounds positive  Musculoskeletal:  edema lower extremities.   Skin: No skin rashes, no ecchymosis    Diagnostics    Free Thyroxine, Serum: AM Sched. Collection: 30-Sep-2019 06:00 (09-29 @ 10:47)  Free Thyroxine, Serum: AM Sched. Collection: 11-Oct-2019 06:00 (10-10 @ 12:19)  Thyroid Stimulating Hormone, Serum: AM Sched. Collection: 11-Oct-2019 06:00 (10-10 @ 12:19)  Cortisol AM, Serum: Routine (10-10 @ 12:19)

## 2019-10-11 NOTE — PROGRESS NOTE ADULT - ATTENDING COMMENTS
Patient seen and examined, agree with above assessment and plan as transcribed above.    - short term Amio   - Progressing well  - Neuro f/u appreciated    Thierry Shetty MD, [facility]  BEEPER [phone]

## 2019-10-11 NOTE — PROGRESS NOTE ADULT - ASSESSMENT
Assessment  DMT2: 69y Male with DM T2 with hyperglycemia, A1C 7.2%, was on oral meds and insulin at home, S/P CABG on insulin, had hypoglycemic episode yesterday, patient refused basal insulin last night, blood sugars trending within acceptable range today, eating full meals, appears much better today. Planning d/c to acute rehab once medically cleared.  Hypothyroidism: Patient has no hx thyroid dz, was not on any thyroid supplements, TSH 6.22 was started on synthroid 50mcg PO daily. New TFTs: TSH 4.53, free thyroxine 1.5.  CAD: postop CABG 10/2, on medications, no chest pain, stable, monitored.  HTN: Controlled,  on antihypertensive medications.  Parkinson's: on meds, stable.          Kelsie Reece MD  Cell: [phone]  Office: [phone] Assessment  DMT2: 69y Male with DM T2 with hyperglycemia, A1C 7.2%, was on oral meds and insulin at home, S/P CABG on insulin, had hypoglycemic episode  yesterday, patient refused basal insulin last night, blood sugars trending within acceptable range today, eating full meals, appears much better today. Planning d/c to acute rehab once medically cleared.  Hypothyroidism: Patient has no hx thyroid dz, was not on any thyroid supplements, TSH 6.22 was started on synthroid 50mcg PO daily. New TFTs: TSH 4.53, free thyroxine 1.5.  CAD: postop CABG 10/2, on medications, no chest pain, stable, monitored.  HTN: Controlled,  on antihypertensive medications.  Parkinson's: on meds, stable.          Kelsie Reece MD  Cell: [phone]  Office: [phone]

## 2019-10-11 NOTE — PROGRESS NOTE ADULT - SUBJECTIVE AND OBJECTIVE BOX
NEPHROLOGY-[facility] [phone]        Patient seen and examined in bed.  He was in good spirits         MEDICATIONS  (STANDING):  acetaminophen   Tablet .. 325 milliGRAM(s) Oral once  amiodarone    Tablet 200 milliGRAM(s) Oral daily  aspirin enteric coated 325 milliGRAM(s) Oral daily  atorvastatin 80 milliGRAM(s) Oral at bedtime  bisacodyl Suppository 10 milliGRAM(s) Rectal once  carbidopa/levodopa  25/100 2 Tablet(s) Oral three times a day  chlorhexidine 2% Cloths 1 Application(s) Topical <User Schedule>  dextrose 5%. 1000 milliLiter(s) (50 mL/Hr) IV Continuous <Continuous>  dextrose 50% Injectable 12.5 Gram(s) IV Push once  dextrose 50% Injectable 25 Gram(s) IV Push once  dextrose 50% Injectable 25 Gram(s) IV Push once  dextrose 50% Injectable 50 milliLiter(s) IV Push every 15 minutes  docusate sodium 100 milliGRAM(s) Oral three times a day  epoetin mari Injectable 4000 Unit(s) SubCutaneous every 7 days  ferrous    sulfate 325 milliGRAM(s) Oral daily  folic acid 1 milliGRAM(s) Oral daily  heparin  Injectable 5000 Unit(s) SubCutaneous every 8 hours  insulin glargine Injectable (LANTUS) 28 Unit(s) SubCutaneous at bedtime  insulin lispro (HumaLOG) corrective regimen sliding scale   SubCutaneous three times a day before meals  insulin lispro (HumaLOG) corrective regimen sliding scale   SubCutaneous at bedtime  insulin lispro Injectable (HumaLOG) 10 Unit(s) SubCutaneous three times a day before meals  levothyroxine 50 MICROGram(s) Oral daily  metoprolol tartrate 25 milliGRAM(s) Oral every 8 hours  pantoprazole    Tablet 40 milliGRAM(s) Oral before breakfast  polyethylene glycol 3350 17 Gram(s) Oral daily  sodium chloride 0.9% lock flush 3 milliLiter(s) IV Push every 8 hours  trihexyphenidyl 2 milliGRAM(s) Oral three times a day      VITAL:  T(C): , Max: 36.7 (10-10-19 @ 19:58)  T(F): , Max: 98 (10-10-19 @ 19:58)  HR: 61 (10-11-19 @ 06:23)  BP: 131/67 (10-11-19 @ 06:23)  BP(mean): --  RR: 18 (10-11-19 @ 06:23)  SpO2: 99% (10-11-19 @ 06:23)  Wt(kg): --    I and O's:    10-10 @ 07:01  -  10-11 @ 07:00  --------------------------------------------------------  IN: 520 mL / OUT: 750 mL / NET: -230 mL    10-11 @ 07:01  -  10-11 @ 08:38  --------------------------------------------------------  IN: 0 mL / OUT: 300 mL / NET: -300 mL        Weight (kg): 109.8 (10-11 @ 07:57)    PHYSICAL EXAM:    Constitutional: NAD  Neck:  No JVD  Respiratory: CTAB/L  Cardiovascular: S1 and S2  Gastrointestinal: BS+, soft, NT/ND  Extremities: No peripheral edema  Neurological: A/O x 3, no focal deficits  Psychiatric: Normal mood, normal affect  : No Javier  Skin: No rashes  Access: Not applicable    LABS:                        10.0   11.51 )-----------( 397      ( 11 Oct 2019 06:34 )             30.0     10-11    134<L>  |  93<L>  |  57<H>  ----------------------------<  141<H>  4.3   |  29  |  3.56<H>    Ca    9.1      11 Oct 2019 06:34            Urine Studies:          RADIOLOGY & ADDITIONAL STUDIES: NEPHROLOGY-[facility] [phone]        Patient seen and examined in bed.  He was in good spirits         MEDICATIONS  (STANDING):  acetaminophen   Tablet .. 325 milliGRAM(s) Oral once  amiodarone    Tablet 200 milliGRAM(s) Oral daily  aspirin enteric coated 325 milliGRAM(s) Oral daily  atorvastatin 80 milliGRAM(s) Oral at bedtime  bisacodyl Suppository 10 milliGRAM(s) Rectal once  carbidopa/levodopa  25/100 2 Tablet(s) Oral three times a day  chlorhexidine 2% Cloths 1 Application(s) Topical <User Schedule>  dextrose 5%. 1000 milliLiter(s) (50 mL/Hr) IV Continuous <Continuous>  dextrose 50% Injectable 12.5 Gram(s) IV Push once  dextrose 50% Injectable 25 Gram(s) IV Push once  dextrose 50% Injectable 25 Gram(s) IV Push once  dextrose 50% Injectable 50 milliLiter(s) IV Push every 15 minutes  docusate sodium 100 milliGRAM(s) Oral three times a day  epoetin mari Injectable 4000 Unit(s) SubCutaneous every 7 days  ferrous    sulfate 325 milliGRAM(s) Oral daily  folic acid 1 milliGRAM(s) Oral daily  heparin  Injectable 5000 Unit(s) SubCutaneous every 8 hours  insulin glargine Injectable (LANTUS) 28 Unit(s) SubCutaneous at bedtime  insulin lispro (HumaLOG) corrective regimen sliding scale   SubCutaneous three times a day before meals  insulin lispro (HumaLOG) corrective regimen sliding scale   SubCutaneous at bedtime  insulin lispro Injectable (HumaLOG) 10 Unit(s) SubCutaneous three times a day before meals  levothyroxine 50 MICROGram(s) Oral daily  metoprolol tartrate 25 milliGRAM(s) Oral every 8 hours  pantoprazole    Tablet 40 milliGRAM(s) Oral before breakfast  polyethylene glycol 3350 17 Gram(s) Oral daily  sodium chloride 0.9% lock flush 3 milliLiter(s) IV Push every 8 hours  trihexyphenidyl 2 milliGRAM(s) Oral three times a day      VITAL:  T(C): , Max: 36.7 (10-10-19 @ 19:58)  T(F): , Max: 98 (10-10-19 @ 19:58)  HR: 61 (10-11-19 @ 06:23)  BP: 131/67 (10-11-19 @ 06:23)  BP(mean): --  RR: 18 (10-11-19 @ 06:23)  SpO2: 99% (10-11-19 @ 06:23)  Wt(kg): --    I and O's:    10-10 @ 07:01  -  10-11 @ 07:00  --------------------------------------------------------  IN: 520 mL / OUT: 750 mL / NET: -230 mL    10-11 @ 07:01  -  10-11 @ 08:38  --------------------------------------------------------  IN: 0 mL / OUT: 300 mL / NET: -300 mL        Weight (kg): 109.8 (10-11 @ 07:57)    PHYSICAL EXAM:    Constitutional: NAD  Neck:  No JVD  Respiratory: CTAB/L  Cardiovascular: S1 and S2  Gastrointestinal: BS+, soft, NT/ND  Extremities: + peripheral edema  Neurological: A/O x 3, no focal deficits  Psychiatric: Normal mood, normal affect  : No Javier  Skin: No rashes  Access: Not applicable    LABS:                        10.0   11.51 )-----------( 397      ( 11 Oct 2019 06:34 )             30.0     10-11    134<L>  |  93<L>  |  57<H>  ----------------------------<  141<H>  4.3   |  29  |  3.56<H>    Ca    9.1      11 Oct 2019 06:34            Urine Studies:          RADIOLOGY & ADDITIONAL STUDIES:

## 2019-10-11 NOTE — PROGRESS NOTE ADULT - SUBJECTIVE AND OBJECTIVE BOX
[facility]rological [facility]      Seen earlier today, and examined.  - Today, patient is without complaints.           *****MEDICATIONS: Current medication reviewed and documented.    MEDICATIONS  (STANDING):  acetaminophen   Tablet .. 325 milliGRAM(s) Oral once  amiodarone    Tablet 200 milliGRAM(s) Oral daily  aspirin enteric coated 325 milliGRAM(s) Oral daily  atorvastatin 80 milliGRAM(s) Oral at bedtime  bisacodyl Suppository 10 milliGRAM(s) Rectal once  carbidopa/levodopa  25/100 2 Tablet(s) Oral three times a day  chlorhexidine 2% Cloths 1 Application(s) Topical <User Schedule>  dextrose 5%. 1000 milliLiter(s) (50 mL/Hr) IV Continuous <Continuous>  dextrose 50% Injectable 12.5 Gram(s) IV Push once  dextrose 50% Injectable 25 Gram(s) IV Push once  dextrose 50% Injectable 25 Gram(s) IV Push once  dextrose 50% Injectable 50 milliLiter(s) IV Push every 15 minutes  docusate sodium 100 milliGRAM(s) Oral three times a day  epoetin mari Injectable 4000 Unit(s) SubCutaneous every 7 days  ferrous    sulfate 325 milliGRAM(s) Oral daily  folic acid 1 milliGRAM(s) Oral daily  heparin  Injectable 5000 Unit(s) SubCutaneous every 8 hours  insulin glargine Injectable (LANTUS) 28 Unit(s) SubCutaneous at bedtime  insulin lispro (HumaLOG) corrective regimen sliding scale   SubCutaneous three times a day before meals  insulin lispro (HumaLOG) corrective regimen sliding scale   SubCutaneous at bedtime  insulin lispro Injectable (HumaLOG) 10 Unit(s) SubCutaneous three times a day before meals  levothyroxine 50 MICROGram(s) Oral daily  metoprolol tartrate 25 milliGRAM(s) Oral every 8 hours  pantoprazole    Tablet 40 milliGRAM(s) Oral before breakfast  polyethylene glycol 3350 17 Gram(s) Oral daily  sodium chloride 0.9% lock flush 3 milliLiter(s) IV Push every 8 hours  trihexyphenidyl 2 milliGRAM(s) Oral three times a day    MEDICATIONS  (PRN):  dextrose 40% Gel 15 Gram(s) Oral once PRN Blood Glucose LESS THAN 70 milliGRAM(s)/deciliter  glucagon  Injectable 1 milliGRAM(s) IntraMuscular once PRN Glucose LESS THAN 70 milligrams/deciliter          ***** VITAL SIGNS:  T(F): 98 (10-11-19 @ 06:23), Max: 98 (10-10-19 @ 19:58)  HR: 61 (10-11-19 @ 06:23) (61 - 79)  BP: 131/67 (10-11-19 @ 06:23) (117/73 - 134/70)  RR: 18 (10-11-19 @ 06:23) (18 - 18)  SpO2: 99% (10-11-19 @ 06:23) (95% - 99%)  Wt(kg): --  ,   I&O's Summary    10 Oct 2019 07:01  -  11 Oct 2019 07:00  --------------------------------------------------------  IN: 520 mL / OUT: 750 mL / NET: -230 mL    11 Oct 2019 07:01  -  11 Oct 2019 10:24  --------------------------------------------------------  IN: 0 mL / OUT: 300 mL / NET: -300 mL             *****PHYSICAL EXAM:  B/l tremors left worse than right    Able to name, repeat.   EOmi fundi not visualized   no nystagmus VFF to confrontation  Tongue is midline  Palate elevates symmetrically   Moving all 4 ext spontaneously no drift appreciated  left sided tremor            *****LAB AND IMAGING:                        10.0   11.51 )-----------( 397      ( 11 Oct 2019 06:34 )             30.0               10-11    134<L>  |  93<L>  |  57<H>  ----------------------------<  141<H>  4.3   |  29  |  3.56<H>    Ca    9.1      11 Oct 2019 06:34                           [All pertinent recent Imaging/Reports reviewed]           *****A S S E S S M E N T   A N D   P L A N :          68 yo M PMHx CAD s/p stent x 1 (2010), T2DM, HTN, Parkinson's presenting with c/o 10/10 chest pain described as burning radiating from abdomen to midsternum x 1 day. Patient reports symptoms started at 2 pm and resolved after 1 hour. Chest pain began after he ate lunch and was walking up a flight of stairs. He reports chest pain was associated with dyspnea, diaphoresis, and nausea. At baseline, he ambulates with cane/walker. Takes medications daily including aspirin and prasugrel. On ROS, reports chronic left leg swelling x 1-2 years.        Problem/Recommendations 1: Dizziness of unclear etiology   s/p cabg,    doing well ambulating oob to chair  continue asa/atorvastatin for secondary prophy    Problem/Recommendations 2: Parkinson's resting tremor slightly worse ? related to amiodarone   continue current regime      Thank you for allowing me to participate in the care of this patient. Please do not hesitate to call me if you have any  questions.        ________________  Lily Fang MD  [facility] Neurological [facility] ([facility]  [phone]      33 minutes spent on total encounter; more than 50 % of the visit was  spent counseling about plan of care, compliance to diet/exercise and medication regimen and or  coordinating care by the attending physician.      It is advised that stroke patients follow up with ZACH Albarran @ [phone] in 1- 2 weeks.   Others please follow up with Dr. Michael Nissenbaum [phone]

## 2019-10-11 NOTE — PROGRESS NOTE ADULT - PROBLEM SELECTOR PLAN 2
New TFTs: TSH 4.53, free thyroxine 1.5. Will continue Synthroid 50 mcg for now, will continue monitoring and FU.  Suggest to FU endocrinology and repeat TFTs in 6 weeks.

## 2019-10-11 NOTE — PROGRESS NOTE ADULT - SUBJECTIVE AND OBJECTIVE BOX
VITAL SIGNS    Telemetry:    Vital Signs Last 24 Hrs  T(C): 36.7 (10-11-19 @ 06:23), Max: 36.7 (10-10-19 @ 19:58)  T(F): 98 (10-11-19 @ 06:23), Max: 98 (10-10-19 @ 19:58)  HR: 61 (10-11-19 @ 06:23) (61 - 79)  BP: 131/67 (10-11-19 @ 06:23) (117/73 - 134/70)  RR: 18 (10-11-19 @ 06:23) (18 - 18)  SpO2: 99% (10-11-19 @ 06:23) (95% - 99%)            10-10 @ 07:01  -  10-11 @ 07:00  --------------------------------------------------------  IN: 520 mL / OUT: 750 mL / NET: -230 mL    10-11 @ 07:01  -  10-11 @ 12:23  --------------------------------------------------------  IN: 360 mL / OUT: 300 mL / NET: 60 mL       Daily     Daily   Admit Wt: Drug Dosing Weight  Height (cm): 180.34 (02 Oct 2019 08:25)  Weight (kg): 109.8 (11 Oct 2019 07:57)  BMI (kg/m2): 33.8 (11 Oct 2019 07:57)  BSA (m2): 2.29 (11 Oct 2019 07:57)     Daily     LABS  10-11    134<L>  |  93<L>  |  57<H>  ----------------------------<  141<H>  4.3   |  29  |  3.56<H>    Ca    9.1      11 Oct 2019 06:34                                   10.0   11.51 )-----------( 397      ( 11 Oct 2019 06:34 )             30.0                    CAPILLARY BLOOD GLUCOSE      POCT Blood Glucose.: 156 mg/dL (11 Oct 2019 07:55)  POCT Blood Glucose.: 104 mg/dL (10 Oct 2019 23:40)  POCT Blood Glucose.: 87 mg/dL (10 Oct 2019 23:21)  POCT Blood Glucose.: 83 mg/dL (10 Oct 2019 22:57)  POCT Blood Glucose.: 67 mg/dL (10 Oct 2019 22:38)  POCT Blood Glucose.: 68 mg/dL (10 Oct 2019 22:09)  POCT Blood Glucose.: 76 mg/dL (10 Oct 2019 21:49)  POCT Blood Glucose.: 104 mg/dL (10 Oct 2019 17:09)  POCT Blood Glucose.: 127 mg/dL (10 Oct 2019 12:32)          Drains:     MS       [  ]   [  ]            L Pleural [  ]            R Pleural  [  ]            BRANDON  [  ]           Javier  [  ]    Pacing Wires      [  ]   Settings:                                  Isolated  [  ]                    CXR:      MEDICATIONS  acetaminophen   Tablet .. 325 milliGRAM(s) Oral once  amiodarone    Tablet 200 milliGRAM(s) Oral daily  aspirin enteric coated 325 milliGRAM(s) Oral daily  atorvastatin 80 milliGRAM(s) Oral at bedtime  bisacodyl Suppository 10 milliGRAM(s) Rectal once  carbidopa/levodopa  25/100 2 Tablet(s) Oral three times a day  chlorhexidine 2% Cloths 1 Application(s) Topical <User Schedule>  dextrose 40% Gel 15 Gram(s) Oral once PRN  dextrose 5%. 1000 milliLiter(s) IV Continuous <Continuous>  dextrose 50% Injectable 12.5 Gram(s) IV Push once  dextrose 50% Injectable 25 Gram(s) IV Push once  dextrose 50% Injectable 25 Gram(s) IV Push once  dextrose 50% Injectable 50 milliLiter(s) IV Push every 15 minutes  docusate sodium 100 milliGRAM(s) Oral three times a day  epoetin mari Injectable 4000 Unit(s) SubCutaneous every 7 days  ferrous    sulfate 325 milliGRAM(s) Oral daily  folic acid 1 milliGRAM(s) Oral daily  glucagon  Injectable 1 milliGRAM(s) IntraMuscular once PRN  heparin  Injectable 5000 Unit(s) SubCutaneous every 8 hours  insulin glargine Injectable (LANTUS) 18 Unit(s) SubCutaneous at bedtime  insulin lispro (HumaLOG) corrective regimen sliding scale   SubCutaneous three times a day before meals  insulin lispro (HumaLOG) corrective regimen sliding scale   SubCutaneous at bedtime  insulin lispro Injectable (HumaLOG) 7 Unit(s) SubCutaneous three times a day before meals  levothyroxine 50 MICROGram(s) Oral daily  metoprolol tartrate 25 milliGRAM(s) Oral every 8 hours  pantoprazole    Tablet 40 milliGRAM(s) Oral before breakfast  polyethylene glycol 3350 17 Gram(s) Oral daily  sodium chloride 0.9% lock flush 3 milliLiter(s) IV Push every 8 hours  trihexyphenidyl 2 milliGRAM(s) Oral three times a day      PHYSICAL EXAM      Neurology: alert and oriented x 3, nonfocal, no gross deficits  CV :S1S2  Sternal Wound :  CDI , Stable  Lungs: cta  Abdomen: soft, nontender, nondistended, positive bowel sounds, last bowel movement   :   voids    Extremities:   1+ edema               PAST MEDICAL & SURGICAL HISTORY:  Shoulder pain, left  Osteoarthritis  Panic attacks  Urinary frequency  Urinary incontinence  Nocturia: x2-3  Gastroesophageal reflux disease without esophagitis: diet controlled  Vertigo: not improved with meclizine in past  Insomnia  Autoimmune disease: started on prednisone on 12/23/15 for &quot;inflammation&quot; but does not know diagnosis  Leg swelling: left leg  CAD (coronary artery disease): s/p 1 stent in 2010  Parkinson disease  Hypercholesterolemia  Diabetes Mellitus, Type 2  Hypertension  S/P angioplasty with stent: 1 stent, RCA  Status Post Cardiac Catheterization                 Discussed with Cardiothoracic Team at AM rounds.

## 2019-10-11 NOTE — PROGRESS NOTE ADULT - PROBLEM SELECTOR PLAN 1
Will decrease Lantus to 18u at bedtime, decrease Humalog to 7u before meals, and continue covg scale. Will continue monitoring FS, log, and FU.  Patient counseled for compliance with consistent low carb diet. Will continue monitoring FS, log, and FU.  Patient counseled for compliance with consistent low carb diet.

## 2019-10-11 NOTE — PROGRESS NOTE ADULT - PROBLEM SELECTOR PLAN 1
Continue with  mg PO Daily.  Continue with Lopressor 25 mg PO TID.   Continue with Amio 200 mg PO daily   Continue with Lipitor 80 mg PO HS   Continue with PT/OT   Glycemic Control per Endo   Monitor Renal function   Increase activity as tolerated.   Encourage Chest PT / Pulmonary toileting and Incentive spirometry every 1 hour x 10 while awake.   Continue with PUD and DVT prophylaxis.   D/C plan- acute rehab

## 2019-10-11 NOTE — PROGRESS NOTE ADULT - ATTENDING COMMENTS
Will decrease Lantus to 18u at bedtime, decrease Humalog to 7u before meals, and continue covg scale.

## 2019-10-12 LAB
ANION GAP SERPL CALC-SCNC: 13 MMOL/L — SIGNIFICANT CHANGE UP (ref 5–17)
BUN SERPL-MCNC: 54 MG/DL — HIGH (ref 7–23)
CALCIUM SERPL-MCNC: 8.8 MG/DL — SIGNIFICANT CHANGE UP (ref 8.4–10.5)
CHLORIDE SERPL-SCNC: 96 MMOL/L — SIGNIFICANT CHANGE UP (ref 96–108)
CHLORIDE UR-SCNC: <35 MMOL/L — SIGNIFICANT CHANGE UP
CO2 SERPL-SCNC: 26 MMOL/L — SIGNIFICANT CHANGE UP (ref 22–31)
CREAT ?TM UR-MCNC: 86 MG/DL — SIGNIFICANT CHANGE UP
CREAT SERPL-MCNC: 3.79 MG/DL — HIGH (ref 0.5–1.3)
GLUCOSE SERPL-MCNC: 120 MG/DL — HIGH (ref 70–99)
HCT VFR BLD CALC: 30.4 % — LOW (ref 39–50)
HGB BLD-MCNC: 9.6 G/DL — LOW (ref 13–17)
MCHC RBC-ENTMCNC: 30.2 PG — SIGNIFICANT CHANGE UP (ref 27–34)
MCHC RBC-ENTMCNC: 31.6 GM/DL — LOW (ref 32–36)
MCV RBC AUTO: 95.6 FL — SIGNIFICANT CHANGE UP (ref 80–100)
NRBC # BLD: 0 /100 WBCS — SIGNIFICANT CHANGE UP (ref 0–0)
PLATELET # BLD AUTO: 377 K/UL — SIGNIFICANT CHANGE UP (ref 150–400)
POTASSIUM SERPL-MCNC: 4.3 MMOL/L — SIGNIFICANT CHANGE UP (ref 3.5–5.3)
POTASSIUM SERPL-SCNC: 4.3 MMOL/L — SIGNIFICANT CHANGE UP (ref 3.5–5.3)
POTASSIUM UR-SCNC: 32 MMOL/L — SIGNIFICANT CHANGE UP
RBC # BLD: 3.18 M/UL — LOW (ref 4.2–5.8)
RBC # FLD: 14.6 % — HIGH (ref 10.3–14.5)
SODIUM SERPL-SCNC: 135 MMOL/L — SIGNIFICANT CHANGE UP (ref 135–145)
SODIUM UR-SCNC: 39 MMOL/L — SIGNIFICANT CHANGE UP
WBC # BLD: 11.7 K/UL — HIGH (ref 3.8–10.5)
WBC # FLD AUTO: 11.7 K/UL — HIGH (ref 3.8–10.5)

## 2019-10-12 RX ORDER — ENTACAPONE 200 MG/1
200 TABLET, FILM COATED ORAL DAILY
Refills: 0 | Status: DISCONTINUED | OUTPATIENT
Start: 2019-10-12 | End: 2019-10-14

## 2019-10-12 RX ADMIN — HEPARIN SODIUM 5000 UNIT(S): 5000 INJECTION INTRAVENOUS; SUBCUTANEOUS at 15:15

## 2019-10-12 RX ADMIN — SODIUM CHLORIDE 3 MILLILITER(S): 9 INJECTION INTRAMUSCULAR; INTRAVENOUS; SUBCUTANEOUS at 05:37

## 2019-10-12 RX ADMIN — Medication 2 MILLIGRAM(S): at 22:04

## 2019-10-12 RX ADMIN — HEPARIN SODIUM 5000 UNIT(S): 5000 INJECTION INTRAVENOUS; SUBCUTANEOUS at 05:32

## 2019-10-12 RX ADMIN — Medication 100 MILLIGRAM(S): at 15:14

## 2019-10-12 RX ADMIN — INSULIN GLARGINE 18 UNIT(S): 100 INJECTION, SOLUTION SUBCUTANEOUS at 22:05

## 2019-10-12 RX ADMIN — Medication 50 MICROGRAM(S): at 05:32

## 2019-10-12 RX ADMIN — HEPARIN SODIUM 5000 UNIT(S): 5000 INJECTION INTRAVENOUS; SUBCUTANEOUS at 22:04

## 2019-10-12 RX ADMIN — Medication 2 MILLIGRAM(S): at 15:14

## 2019-10-12 RX ADMIN — Medication 100 MILLIGRAM(S): at 22:04

## 2019-10-12 RX ADMIN — Medication 1 MILLIGRAM(S): at 12:24

## 2019-10-12 RX ADMIN — SODIUM CHLORIDE 3 MILLILITER(S): 9 INJECTION INTRAMUSCULAR; INTRAVENOUS; SUBCUTANEOUS at 16:31

## 2019-10-12 RX ADMIN — Medication 7 UNIT(S): at 17:23

## 2019-10-12 RX ADMIN — ENTACAPONE 200 MILLIGRAM(S): 200 TABLET, FILM COATED ORAL at 17:23

## 2019-10-12 RX ADMIN — Medication 325 MILLIGRAM(S): at 12:24

## 2019-10-12 RX ADMIN — ATORVASTATIN CALCIUM 80 MILLIGRAM(S): 80 TABLET, FILM COATED ORAL at 22:03

## 2019-10-12 RX ADMIN — SODIUM CHLORIDE 3 MILLILITER(S): 9 INJECTION INTRAMUSCULAR; INTRAVENOUS; SUBCUTANEOUS at 22:00

## 2019-10-12 RX ADMIN — Medication 7 UNIT(S): at 08:11

## 2019-10-12 RX ADMIN — POLYETHYLENE GLYCOL 3350 17 GRAM(S): 17 POWDER, FOR SOLUTION ORAL at 12:24

## 2019-10-12 RX ADMIN — Medication 2: at 17:23

## 2019-10-12 RX ADMIN — Medication 2 MILLIGRAM(S): at 05:32

## 2019-10-12 RX ADMIN — CARBIDOPA AND LEVODOPA 2 TABLET(S): 25; 100 TABLET ORAL at 22:04

## 2019-10-12 RX ADMIN — ERYTHROPOIETIN 4000 UNIT(S): 10000 INJECTION, SOLUTION INTRAVENOUS; SUBCUTANEOUS at 17:23

## 2019-10-12 RX ADMIN — Medication 25 MILLIGRAM(S): at 15:14

## 2019-10-12 RX ADMIN — Medication 25 MILLIGRAM(S): at 05:32

## 2019-10-12 RX ADMIN — CHLORHEXIDINE GLUCONATE 1 APPLICATION(S): 213 SOLUTION TOPICAL at 08:30

## 2019-10-12 RX ADMIN — AMIODARONE HYDROCHLORIDE 200 MILLIGRAM(S): 400 TABLET ORAL at 05:32

## 2019-10-12 RX ADMIN — Medication 7 UNIT(S): at 12:24

## 2019-10-12 RX ADMIN — CARBIDOPA AND LEVODOPA 2 TABLET(S): 25; 100 TABLET ORAL at 15:14

## 2019-10-12 RX ADMIN — CARBIDOPA AND LEVODOPA 2 TABLET(S): 25; 100 TABLET ORAL at 05:32

## 2019-10-12 RX ADMIN — Medication 2: at 12:24

## 2019-10-12 RX ADMIN — PANTOPRAZOLE SODIUM 40 MILLIGRAM(S): 20 TABLET, DELAYED RELEASE ORAL at 05:32

## 2019-10-12 RX ADMIN — Medication 25 MILLIGRAM(S): at 22:03

## 2019-10-12 NOTE — PROGRESS NOTE ADULT - SUBJECTIVE AND OBJECTIVE BOX
Patient seen and examined in chair breathing evenly & non-labored.  NAD.    REVIEW OF SYSTEMS:  As per HPI, otherwise 8 full 10 ROS were unremarkable    MEDICATIONS  (STANDING):  acetaminophen   Tablet .. 325 milliGRAM(s) Oral once  amiodarone    Tablet 200 milliGRAM(s) Oral daily  aspirin enteric coated 325 milliGRAM(s) Oral daily  atorvastatin 80 milliGRAM(s) Oral at bedtime  bisacodyl Suppository 10 milliGRAM(s) Rectal once  carbidopa/levodopa  25/100 2 Tablet(s) Oral three times a day  chlorhexidine 2% Cloths 1 Application(s) Topical <User Schedule>  dextrose 5%. 1000 milliLiter(s) (50 mL/Hr) IV Continuous <Continuous>  dextrose 50% Injectable 12.5 Gram(s) IV Push once  dextrose 50% Injectable 25 Gram(s) IV Push once  dextrose 50% Injectable 25 Gram(s) IV Push once  dextrose 50% Injectable 50 milliLiter(s) IV Push every 15 minutes  docusate sodium 100 milliGRAM(s) Oral three times a day  epoetin mari Injectable 4000 Unit(s) SubCutaneous every 7 days  ferrous    sulfate 325 milliGRAM(s) Oral daily  folic acid 1 milliGRAM(s) Oral daily  heparin  Injectable 5000 Unit(s) SubCutaneous every 8 hours  insulin glargine Injectable (LANTUS) 18 Unit(s) SubCutaneous at bedtime  insulin lispro (HumaLOG) corrective regimen sliding scale   SubCutaneous three times a day before meals  insulin lispro (HumaLOG) corrective regimen sliding scale   SubCutaneous at bedtime  insulin lispro Injectable (HumaLOG) 7 Unit(s) SubCutaneous three times a day before meals  levothyroxine 50 MICROGram(s) Oral daily  metoprolol tartrate 25 milliGRAM(s) Oral every 8 hours  pantoprazole    Tablet 40 milliGRAM(s) Oral before breakfast  polyethylene glycol 3350 17 Gram(s) Oral daily  sodium chloride 0.9% lock flush 3 milliLiter(s) IV Push every 8 hours  trihexyphenidyl 2 milliGRAM(s) Oral three times a day      VITAL:  T(C): , Max: 36.8 (10-11-19 @ 20:00)  T(F): , Max: 98.3 (10-11-19 @ 20:00)  HR: 61 (10-12-19 @ 08:09)  BP: 102/62 (10-12-19 @ 08:09)  BP(mean): --  RR: 20 (10-12-19 @ 08:10)  SpO2: 97% (10-12-19 @ 08:10)  Wt(kg): --    I and O's:    10-11 @ 07:01  -  10-12 @ 07:00  --------------------------------------------------------  IN: 1080 mL / OUT: 950 mL / NET: 130 mL    10-12 @ 07:01  -  10-12 @ 14:09  --------------------------------------------------------  IN: 240 mL / OUT: 100 mL / NET: 140 mL          PHYSICAL EXAM:    Constitutional: NAD  HEENT: PERRLA, EOMI,  MMM  Neck: No LAD, No JVD  Respiratory: CTAB  Cardiovascular: S1 and S2  Gastrointestinal: BS+, soft, NT/ND  Extremities: + b/l l/e edema  Neurological: A/O x 3, no focal deficits  Psychiatric: Normal mood, normal affect  : No Johnson  Skin: No rashes  Access: Not applicable    LABS:                        9.6    11.70 )-----------( 377      ( 12 Oct 2019 06:02 )             30.4     10-12    135  |  96  |  54<H>  ----------------------------<  120<H>  4.3   |  26  |  3.79<H>    Ca    8.8      12 Oct 2019 06:02      Assessment and Plan:   · Assessment	  68 yo M PMHx CAD s/p stent x 1 (2010), T2DM, HTN, Parkinson's and CKD stage 3b presents with chest pain. S/P cath with MVD- POD #9  CABG  Subnephrotic range proteinuria     1. Renal - [id number].  Trend BMP and urine output- patient reports periods of incontinence; when able to go into johnson catheter, it has been noted by RN that he is going 100 cc at a time, ? retention as azotemia has been rising since johnson has been discontinued.  Diuretics have been on hold for several days.  Bladder scan was done overnight though patient was sitting in chair and has abdominal distention, doubt measurement was accurate.    2. CVS   BP is stable     3.. Hyponatremia- hypervolemic; off diuretics dt azotemia; breathing evenly & non-labored    4. Anemia s/p Epogen; Hgb slowly trending down though acceptable for now    Recommendations  - Trend renal profile.  - Repeat bladder scan when patient laying down in bed  - If no urinary retention noted, send urine studies as ordered; discussed with RN  - Fluid restriction 1.2 L per day   - defer diuretics for now given azotemia

## 2019-10-12 NOTE — PROGRESS NOTE ADULT - ASSESSMENT
68 yo M PMHx CAD s/p stent x 1 (2010), T2DM, HTN, Parkinson's presenting with c/o 10/10 chest pain described as burning radiating from abdomen to midsternum x 1 day. Patient reports symptoms started at 2 pm and resolved after 1 hour. Chest pain began after he ate lunch and was walking up a flight of stairs. He reports chest pain was associated with dyspnea, diaphoresis, and nausea. At baseline, he ambulates with cane/walker. Takes medications daily including aspirin and prasugrel. On ROS, reports chronic left leg swelling x 1-2 years. Pt s/p cardiac cath at [facility] found to have multivessel disease requiring transfer to [facility] CT surgery service.   9/26 Nephrology consulted, aldactone discontinued. Pre op Transthoracic Echocardiogram Mitral annular calcification, otherwise normal mitral  valve. Calcified trileaflet aortic valve with normal opening. Mild aortic regurgitation. Mild concentric left ventricular hypertrophy. Endocardium not well visualized; grossly normal left ventricular systolic function.  9/27 VVS; Pre op Cardiac Surgery work up in progress. PT evaluation to assess pre op baseline activity level.  Plan for cardiac surgery in next week Wednesday with Dr. Guillory.  On 10/2/19 C5L LIMA to LAD, SVG to RPDA, SVG to RPL, SVG to Ramus, SVG to OM.   Post op Course:   Extubated on POD # 1  Pressor support required à weaned off   (+) dizziness à Neurology following à Unclear of etiology   CKD à Renal following; on Torsemide 10 mg PO daily    Hyperglycemia / Hypothyroid à Endo following. Continue on Synthroid 50 mcg PO daily.   10/7 Transferred to [facility].   10/8 VSS - SR 58 - pt offers no complaints- alex patent, d/c plan to rehab  10/9 HD stable. Pt yet to have BM post-op-given mag citrate this PM.   Javier d/c'd today. Voiding well post-removal.   Discharge planning-*change* re-eval-acute rehab.   10/10: HD stable. CXR today-small left pleural effusion-encouraging improved pulm toilet.  Voiding well since Javier dc'd yesterday.  Stable BUN/Creat  BM x3 overnight after mag citrate yesterday PM.   Discharge planning-acute rehab.   10/11 VSS no events overnight awaiting PMR for dispo recs  10/12: Increase in creatinine today. D/w with Kahlil- will continue to watch for now-renal f/u

## 2019-10-12 NOTE — PROGRESS NOTE ADULT - SUBJECTIVE AND OBJECTIVE BOX
Chief complaint  Patient is a 69y old  Male who presents with a chief complaint of Multivessel disease (12 Oct 2019 15:38)   Review of systems  Patient in bed, looks comfortable, no fever, no hypoglycemia.    Labs and Fingersticks  CAPILLARY BLOOD GLUCOSE      POCT Blood Glucose.: 151 mg/dL (12 Oct 2019 17:06)  POCT Blood Glucose.: 174 mg/dL (12 Oct 2019 12:07)  POCT Blood Glucose.: 142 mg/dL (12 Oct 2019 07:41)  POCT Blood Glucose.: 149 mg/dL (11 Oct 2019 21:14)      Anion Gap, Serum: 13 (10-12 @ 06:02)  Anion Gap, Serum: 12 (10-11 @ 06:34)      Calcium, Total Serum: 8.8 (10-12 @ 06:02)  Calcium, Total Serum: 9.1 (10-11 @ 06:34)          10-12    135  |  96  |  54<H>  ----------------------------<  120<H>  4.3   |  26  |  3.79<H>    Ca    8.8      12 Oct 2019 06:02                          9.6    11.70 )-----------( 377      ( 12 Oct 2019 06:02 )             30.4     Medications  MEDICATIONS  (STANDING):  acetaminophen   Tablet .. 325 milliGRAM(s) Oral once  amiodarone    Tablet 200 milliGRAM(s) Oral daily  aspirin enteric coated 325 milliGRAM(s) Oral daily  atorvastatin 80 milliGRAM(s) Oral at bedtime  bisacodyl Suppository 10 milliGRAM(s) Rectal once  carbidopa/levodopa  25/100 2 Tablet(s) Oral three times a day  chlorhexidine 2% Cloths 1 Application(s) Topical <User Schedule>  dextrose 5%. 1000 milliLiter(s) (50 mL/Hr) IV Continuous <Continuous>  dextrose 50% Injectable 12.5 Gram(s) IV Push once  dextrose 50% Injectable 25 Gram(s) IV Push once  dextrose 50% Injectable 25 Gram(s) IV Push once  dextrose 50% Injectable 50 milliLiter(s) IV Push every 15 minutes  docusate sodium 100 milliGRAM(s) Oral three times a day  entacapone 200 milliGRAM(s) Oral daily  epoetin mari Injectable 4000 Unit(s) SubCutaneous every 7 days  ferrous    sulfate 325 milliGRAM(s) Oral daily  folic acid 1 milliGRAM(s) Oral daily  heparin  Injectable 5000 Unit(s) SubCutaneous every 8 hours  insulin glargine Injectable (LANTUS) 18 Unit(s) SubCutaneous at bedtime  insulin lispro (HumaLOG) corrective regimen sliding scale   SubCutaneous three times a day before meals  insulin lispro (HumaLOG) corrective regimen sliding scale   SubCutaneous at bedtime  insulin lispro Injectable (HumaLOG) 7 Unit(s) SubCutaneous three times a day before meals  levothyroxine 50 MICROGram(s) Oral daily  metoprolol tartrate 25 milliGRAM(s) Oral every 8 hours  pantoprazole    Tablet 40 milliGRAM(s) Oral before breakfast  polyethylene glycol 3350 17 Gram(s) Oral daily  sodium chloride 0.9% lock flush 3 milliLiter(s) IV Push every 8 hours  trihexyphenidyl 2 milliGRAM(s) Oral three times a day      Physical Exam  General: Patient comfortable in bed  Vital Signs Last 12 Hrs  T(F): 98.4 (10-12-19 @ 14:47), Max: 98.4 (10-12-19 @ 14:47)  HR: 64 (10-12-19 @ 14:47) (61 - 64)  BP: 132/74 (10-12-19 @ 14:47) (102/62 - 132/74)  BP(mean): --  RR: 18 (10-12-19 @ 14:47) (18 - 20)  SpO2: 99% (10-12-19 @ 14:47) (88% - 99%)  Neck: No palpable thyroid nodules.  CVS: S1S2, No murmurs  Respiratory: No wheezing, no crepitations  GI: Abdomen soft, bowel sounds positive  Musculoskeletal:  edema lower extremities.   Skin: No skin rashes, no ecchymosis    Diagnostics

## 2019-10-12 NOTE — PROGRESS NOTE ADULT - ASSESSMENT
Assessment  DMT2: 69y Male with DM T2 with hyperglycemia, A1C 7.2%, was on oral meds and insulin at home, S/P CABG on insulin, no new hypoglycemic events, FS improving, he feels lethargic and sweaty, spot ,  eating full meals, appears much better today. Planning d/c to acute rehab once medically cleared.  Hypothyroidism: Patient has no hx thyroid dz, was not on any thyroid supplements, TSH 6.22 was started on synthroid 50mcg PO daily. New TFTs: TSH 4.53, free thyroxine 1.5.  CAD: postop CABG 10/2, on medications, no chest pain, stable, monitored.  HTN: Controlled,  on antihypertensive medications.  Parkinson's: on meds, stable.          Kelsie Reece MD  Cell: [phone]  Office: [phone]

## 2019-10-12 NOTE — PROGRESS NOTE ADULT - ATTENDING COMMENTS
Patient seen and examined.  Agree with above.   -continue with medical management of cad  -supportive care per cts    Apple Feliz MD

## 2019-10-12 NOTE — PROGRESS NOTE ADULT - SUBJECTIVE AND OBJECTIVE BOX
[facility]      Seen earlier today, and examined.  - Today, patient is without complaints.           *****MEDICATIONS: Current medication reviewed and documented.    MEDICATIONS  (STANDING):  acetaminophen   Tablet .. 325 milliGRAM(s) Oral once  amiodarone    Tablet 200 milliGRAM(s) Oral daily  aspirin enteric coated 325 milliGRAM(s) Oral daily  atorvastatin 80 milliGRAM(s) Oral at bedtime  bisacodyl Suppository 10 milliGRAM(s) Rectal once  carbidopa/levodopa  25/100 2 Tablet(s) Oral three times a day  chlorhexidine 2% Cloths 1 Application(s) Topical <User Schedule>  dextrose 5%. 1000 milliLiter(s) (50 mL/Hr) IV Continuous <Continuous>  dextrose 50% Injectable 12.5 Gram(s) IV Push once  dextrose 50% Injectable 25 Gram(s) IV Push once  dextrose 50% Injectable 25 Gram(s) IV Push once  dextrose 50% Injectable 50 milliLiter(s) IV Push every 15 minutes  docusate sodium 100 milliGRAM(s) Oral three times a day  entacapone 200 milliGRAM(s) Oral daily  epoetin mari Injectable 4000 Unit(s) SubCutaneous every 7 days  ferrous    sulfate 325 milliGRAM(s) Oral daily  folic acid 1 milliGRAM(s) Oral daily  heparin  Injectable 5000 Unit(s) SubCutaneous every 8 hours  insulin glargine Injectable (LANTUS) 18 Unit(s) SubCutaneous at bedtime  insulin lispro (HumaLOG) corrective regimen sliding scale   SubCutaneous three times a day before meals  insulin lispro (HumaLOG) corrective regimen sliding scale   SubCutaneous at bedtime  insulin lispro Injectable (HumaLOG) 7 Unit(s) SubCutaneous three times a day before meals  levothyroxine 50 MICROGram(s) Oral daily  metoprolol tartrate 25 milliGRAM(s) Oral every 8 hours  pantoprazole    Tablet 40 milliGRAM(s) Oral before breakfast  polyethylene glycol 3350 17 Gram(s) Oral daily  sodium chloride 0.9% lock flush 3 milliLiter(s) IV Push every 8 hours  trihexyphenidyl 2 milliGRAM(s) Oral three times a day    MEDICATIONS  (PRN):  dextrose 40% Gel 15 Gram(s) Oral once PRN Blood Glucose LESS THAN 70 milliGRAM(s)/deciliter  glucagon  Injectable 1 milliGRAM(s) IntraMuscular once PRN Glucose LESS THAN 70 milligrams/deciliter          ***** VITAL SIGNS:  T(F): 98.4 (10-12-19 @ 14:47), Max: 98.4 (10-12-19 @ 14:47)  HR: 64 (10-12-19 @ 14:47) (61 - 65)  BP: 132/74 (10-12-19 @ 14:47) (102/62 - 142/75)  RR: 18 (10-12-19 @ 14:47) (18 - 20)  SpO2: 99% (10-12-19 @ 14:47) (88% - 99%)  Wt(kg): --  ,   I&O's Summary    11 Oct 2019 07:  -  12 Oct 2019 07:00  --------------------------------------------------------  IN: 1080 mL / OUT: 950 mL / NET: 130 mL    12 Oct 2019 07:01  -  12 Oct 2019 15:39  --------------------------------------------------------  IN: 600 mL / OUT: 700 mL / NET: -100 mL             *****PHYSICAL EXAM:     B/l tremors left worse than right    Able to name, repeat.   EOmi fundi not visualized   no nystagmus VFF to confrontation  Tongue is midline  Palate elevates symmetrically   Moving all 4 ext spontaneously no drift appreciated  left sided tremor            *****LAB AND IMAGIN.6    11.70 )-----------( 377      ( 12 Oct 2019 06:02 )             30.4               10-12    135  |  96  |  54<H>  ----------------------------<  120<H>  4.3   |  26  |  3.79<H>    Ca    8.8      12 Oct 2019 06:02                           [All pertinent recent Imaging/Reports reviewed]           *****A S S E S S M E N T   A N D   P L A N :68 yo M PMHx CAD s/p stent x 1 (), T2DM, HTN, Parkinson's presenting with c/o 10/10 chest pain described as burning radiating from abdomen to midsternum x 1 day. Patient reports symptoms started at 2 pm and resolved after 1 hour. Chest pain began after he ate lunch and was walking up a flight of stairs. He reports chest pain was associated with dyspnea, diaphoresis, and nausea. At baseline, he ambulates with cane/walker. Takes medications daily including aspirin and prasugrel. On ROS, reports chronic left leg swelling x 1-2 years.        Problem/Recommendations 1: Dizziness of unclear etiology   s/p cabg,    doing well ambulating oob to chair  continue asa/atorvastatin for secondary prophy    Problem/Recommendations 2: Parkinson's resting tremor slightly worse ? related to amiodarone   continue current regime  will add comtan and titrate as tolerated         Thank you for allowing me to participate in the care of this patient. Please do not hesitate to call me if you have any  questions.        ________________  Lily Fang MD  [facility] ([facility]  [phone]      33 minutes spent on total encounter; more than 50 % of the visit was  spent counseling about plan of care, compliance to diet/exercise and medication regimen and or  coordinating care by the attending physician.      It is advised that stroke patients follow up with ZACH Albarran @ [phone] in 1- 2 weeks.   Others please follow up with Dr. Michael Nissenbaum [phone]

## 2019-10-12 NOTE — PROGRESS NOTE ADULT - SUBJECTIVE AND OBJECTIVE BOX
pt seen and examined, no complaints, ROS - .          acetaminophen   Tablet .. 325 milliGRAM(s) Oral once  amiodarone    Tablet 200 milliGRAM(s) Oral daily  aspirin enteric coated 325 milliGRAM(s) Oral daily  atorvastatin 80 milliGRAM(s) Oral at bedtime  bisacodyl Suppository 10 milliGRAM(s) Rectal once  carbidopa/levodopa  25/100 2 Tablet(s) Oral three times a day  chlorhexidine 2% Cloths 1 Application(s) Topical <User Schedule>  dextrose 40% Gel 15 Gram(s) Oral once PRN  dextrose 5%. 1000 milliLiter(s) IV Continuous <Continuous>  dextrose 50% Injectable 12.5 Gram(s) IV Push once  dextrose 50% Injectable 25 Gram(s) IV Push once  dextrose 50% Injectable 25 Gram(s) IV Push once  dextrose 50% Injectable 50 milliLiter(s) IV Push every 15 minutes  docusate sodium 100 milliGRAM(s) Oral three times a day  epoetin mari Injectable 4000 Unit(s) SubCutaneous every 7 days  ferrous    sulfate 325 milliGRAM(s) Oral daily  folic acid 1 milliGRAM(s) Oral daily  glucagon  Injectable 1 milliGRAM(s) IntraMuscular once PRN  heparin  Injectable 5000 Unit(s) SubCutaneous every 8 hours  insulin glargine Injectable (LANTUS) 18 Unit(s) SubCutaneous at bedtime  insulin lispro (HumaLOG) corrective regimen sliding scale   SubCutaneous three times a day before meals  insulin lispro (HumaLOG) corrective regimen sliding scale   SubCutaneous at bedtime  insulin lispro Injectable (HumaLOG) 7 Unit(s) SubCutaneous three times a day before meals  levothyroxine 50 MICROGram(s) Oral daily  metoprolol tartrate 25 milliGRAM(s) Oral every 8 hours  pantoprazole    Tablet 40 milliGRAM(s) Oral before breakfast  polyethylene glycol 3350 17 Gram(s) Oral daily  sodium chloride 0.9% lock flush 3 milliLiter(s) IV Push every 8 hours  trihexyphenidyl 2 milliGRAM(s) Oral three times a day                            9.6    11.70 )-----------( 377      ( 12 Oct 2019 06:02 )             30.4       Hemoglobin: 9.6 g/dL (10-12 @ 06:02)  Hemoglobin: 10.0 g/dL (10-11 @ 06:34)  Hemoglobin: 10.0 g/dL (10-10 @ 06:32)  Hemoglobin: 9.5 g/dL (10-09 @ 05:50)  Hemoglobin: 9.1 g/dL (10-08 @ 07:28)      10-12    135  |  96  |  54<H>  ----------------------------<  120<H>  4.3   |  26  |  3.79<H>    Ca    8.8      12 Oct 2019 06:02      Creatinine Trend: 3.79<--, 3.56<--, 3.27<--, 3.32<--, 3.44<--, 3.13<--    COAGS:           T(C): 36.6 (10-12-19 @ 05:27), Max: 36.8 (10-11-19 @ 13:36)  HR: 61 (10-12-19 @ 08:09) (61 - 65)  BP: 102/62 (10-12-19 @ 08:09) (102/62 - 142/75)  RR: 20 (10-12-19 @ 08:09) (18 - 20)  SpO2: 88% (10-12-19 @ 08:09) (88% - 95%)  Wt(kg): --    I&O's Summary    11 Oct 2019 07:01  -  12 Oct 2019 07:00  --------------------------------------------------------  IN: 1080 mL / OUT: 950 mL / NET: 130 mL        Gen: Appears well in NAD  HEENT:  (-)icterus (-)pallor  CV: N S1 S2 1/6 JENNIFER (+)2 Pulses B/l  Resp:  Clear to auscultation B/L, normal effort  GI: (+) BS Soft, NT, ND  Lymph:  (+) B/L LE Edema, (-)obvious lymphadenopathy  Skin: Warm to touch, Normal turgor  Psych: Appropriate mood and affect      TELEMETRY: SB/SR 50-70        DIAGNOSTIC TESTING:  [ ] Echocardiogram: < from: Intra-Operative Transesophageal Echo (10.02.19 @ 13:30) >  Conclusions:  1. Mild segmental left ventricular systolic dysfunction.  Apicl hypokinesis  2. Moderate diastolic dysfunction (Stage II).  Confirmed on  10/2/2019 - 14:16:07 by Daniel Duran M.D.  ------------------------------------------------------------------------    < end of copied text >      ASSESSMENT/PLAN:   68 yo M PMHx CAD s/p stent x 1 (2010), T2DM, HTN, Parkinson's, history of meningioma, admitted with NSTEMI, TTE with mild segmental LV dysfxn, cath with multivessel CAD, s/p C5L LIMA-LAD, SVG-RPDA, SVG-RPL, SVG-Ramus, SVG-OM 10/2    - Monitor tele stable overnight, no arrythmia   - Cont ASA, statin   - tolerating AMIO, BB * hr stable   - A/C per CTS   - GI / DVT prophylaxis, - keep K>4, mg >2.0   - Renal f/u - trend creatinine, off diuretics at present   - Supportive care per CTS appreciated  - Patient to f/u with his cardiologist Dr. Wagner Simons after discharge

## 2019-10-12 NOTE — PROGRESS NOTE ADULT - SUBJECTIVE AND OBJECTIVE BOX
VITAL SIGNS    Telemetry:    Vital Signs Last 24 Hrs  T(C): 36.6 (10-12-19 @ 05:27), Max: 36.8 (10-11-19 @ 13:36)  T(F): 97.9 (10-12-19 @ 05:27), Max: 98.3 (10-11-19 @ 20:00)  HR: 61 (10-12-19 @ 08:09) (61 - 65)  BP: 102/62 (10-12-19 @ 08:09) (102/62 - 142/75)  RR: 20 (10-12-19 @ 08:10) (18 - 20)  SpO2: 97% (10-12-19 @ 08:10) (88% - 97%)            10-11 @ 07:01  -  10-12 @ 07:00  --------------------------------------------------------  IN: 1080 mL / OUT: 950 mL / NET: 130 mL    10-12 @ 07:01  -  10-12 @ 13:31  --------------------------------------------------------  IN: 240 mL / OUT: 100 mL / NET: 140 mL       Daily     Daily   Admit Wt: Drug Dosing Weight  Height (cm): 180.34 (02 Oct 2019 08:25)  Weight (kg): 109.8 (11 Oct 2019 07:57)  BMI (kg/m2): 33.8 (11 Oct 2019 07:57)  BSA (m2): 2.29 (11 Oct 2019 07:57)     Daily     LABS  10-12    135  |  96  |  54<H>  ----------------------------<  120<H>  4.3   |  26  |  3.79<H>    Ca    8.8      12 Oct 2019 06:02                                   9.6    11.70 )-----------( 377      ( 12 Oct 2019 06:02 )             30.4                    CAPILLARY BLOOD GLUCOSE      POCT Blood Glucose.: 174 mg/dL (12 Oct 2019 12:07)  POCT Blood Glucose.: 142 mg/dL (12 Oct 2019 07:41)  POCT Blood Glucose.: 149 mg/dL (11 Oct 2019 21:14)  POCT Blood Glucose.: 152 mg/dL (11 Oct 2019 17:04)          Drains:     MS       [  ]   [  ]            L Pleural [  ]            R Pleural  [  ]            BRANDON  [  ]           Javier  [  ]    Pacing Wires      [  ]   Settings:                                  Isolated  [  ]                    CXR:      MEDICATIONS  acetaminophen   Tablet .. 325 milliGRAM(s) Oral once  amiodarone    Tablet 200 milliGRAM(s) Oral daily  aspirin enteric coated 325 milliGRAM(s) Oral daily  atorvastatin 80 milliGRAM(s) Oral at bedtime  bisacodyl Suppository 10 milliGRAM(s) Rectal once  carbidopa/levodopa  25/100 2 Tablet(s) Oral three times a day  chlorhexidine 2% Cloths 1 Application(s) Topical <User Schedule>  dextrose 40% Gel 15 Gram(s) Oral once PRN  dextrose 5%. 1000 milliLiter(s) IV Continuous <Continuous>  dextrose 50% Injectable 12.5 Gram(s) IV Push once  dextrose 50% Injectable 25 Gram(s) IV Push once  dextrose 50% Injectable 25 Gram(s) IV Push once  dextrose 50% Injectable 50 milliLiter(s) IV Push every 15 minutes  docusate sodium 100 milliGRAM(s) Oral three times a day  epoetin mari Injectable 4000 Unit(s) SubCutaneous every 7 days  ferrous    sulfate 325 milliGRAM(s) Oral daily  folic acid 1 milliGRAM(s) Oral daily  glucagon  Injectable 1 milliGRAM(s) IntraMuscular once PRN  heparin  Injectable 5000 Unit(s) SubCutaneous every 8 hours  insulin glargine Injectable (LANTUS) 18 Unit(s) SubCutaneous at bedtime  insulin lispro (HumaLOG) corrective regimen sliding scale   SubCutaneous three times a day before meals  insulin lispro (HumaLOG) corrective regimen sliding scale   SubCutaneous at bedtime  insulin lispro Injectable (HumaLOG) 7 Unit(s) SubCutaneous three times a day before meals  levothyroxine 50 MICROGram(s) Oral daily  metoprolol tartrate 25 milliGRAM(s) Oral every 8 hours  pantoprazole    Tablet 40 milliGRAM(s) Oral before breakfast  polyethylene glycol 3350 17 Gram(s) Oral daily  sodium chloride 0.9% lock flush 3 milliLiter(s) IV Push every 8 hours  trihexyphenidyl 2 milliGRAM(s) Oral three times a day      PHYSICAL EXAM      Neurology: alert and oriented x 3, nonfocal, no gross deficits  CV :S1S2  Sternal Wound :  CDI , Stable  Lungs: cta  Abdomen: soft, nontender, nondistended, positive bowel sounds, last bowel movement   :  voids     Extremities: 2-3+ edema                 PAST MEDICAL & SURGICAL HISTORY:  Shoulder pain, left  Osteoarthritis  Panic attacks  Urinary frequency  Urinary incontinence  Nocturia: x2-3  Gastroesophageal reflux disease without esophagitis: diet controlled  Vertigo: not improved with meclizine in past  Insomnia  Autoimmune disease: started on prednisone on 12/23/15 for &quot;inflammation&quot; but does not know diagnosis  Leg swelling: left leg  CAD (coronary artery disease): s/p 1 stent in 2010  Parkinson disease  Hypercholesterolemia  Diabetes Mellitus, Type 2  Hypertension  S/P angioplasty with stent: 1 stent, RCA  Status Post Cardiac Catheterization                 Discussed with Cardiothoracic Team at AM rounds.

## 2019-10-13 LAB
ANION GAP SERPL CALC-SCNC: 13 MMOL/L — SIGNIFICANT CHANGE UP (ref 5–17)
BUN SERPL-MCNC: 52 MG/DL — HIGH (ref 7–23)
CALCIUM SERPL-MCNC: 9.3 MG/DL — SIGNIFICANT CHANGE UP (ref 8.4–10.5)
CHLORIDE SERPL-SCNC: 94 MMOL/L — LOW (ref 96–108)
CO2 SERPL-SCNC: 27 MMOL/L — SIGNIFICANT CHANGE UP (ref 22–31)
CREAT SERPL-MCNC: 3.67 MG/DL — HIGH (ref 0.5–1.3)
GLUCOSE SERPL-MCNC: 136 MG/DL — HIGH (ref 70–99)
HCT VFR BLD CALC: 29.2 % — LOW (ref 39–50)
HGB BLD-MCNC: 9.7 G/DL — LOW (ref 13–17)
MCHC RBC-ENTMCNC: 30.6 PG — SIGNIFICANT CHANGE UP (ref 27–34)
MCHC RBC-ENTMCNC: 33.2 GM/DL — SIGNIFICANT CHANGE UP (ref 32–36)
MCV RBC AUTO: 92.1 FL — SIGNIFICANT CHANGE UP (ref 80–100)
NRBC # BLD: 0 /100 WBCS — SIGNIFICANT CHANGE UP (ref 0–0)
PLATELET # BLD AUTO: 367 K/UL — SIGNIFICANT CHANGE UP (ref 150–400)
POTASSIUM SERPL-MCNC: 4.3 MMOL/L — SIGNIFICANT CHANGE UP (ref 3.5–5.3)
POTASSIUM SERPL-SCNC: 4.3 MMOL/L — SIGNIFICANT CHANGE UP (ref 3.5–5.3)
PROT ?TM UR-MCNC: 68 MG/DL — HIGH (ref 0–12)
RBC # BLD: 3.17 M/UL — LOW (ref 4.2–5.8)
RBC # FLD: 14.3 % — SIGNIFICANT CHANGE UP (ref 10.3–14.5)
SODIUM SERPL-SCNC: 134 MMOL/L — LOW (ref 135–145)
WBC # BLD: 11.46 K/UL — HIGH (ref 3.8–10.5)
WBC # FLD AUTO: 11.46 K/UL — HIGH (ref 3.8–10.5)

## 2019-10-13 PROCEDURE — 71045 X-RAY EXAM CHEST 1 VIEW: CPT | Mod: 26

## 2019-10-13 RX ORDER — INSULIN LISPRO 100/ML
8 VIAL (ML) SUBCUTANEOUS
Refills: 0 | Status: DISCONTINUED | OUTPATIENT
Start: 2019-10-13 | End: 2019-10-15

## 2019-10-13 RX ORDER — INSULIN GLARGINE 100 [IU]/ML
22 INJECTION, SOLUTION SUBCUTANEOUS AT BEDTIME
Refills: 0 | Status: DISCONTINUED | OUTPATIENT
Start: 2019-10-13 | End: 2019-10-15

## 2019-10-13 RX ADMIN — Medication 2: at 08:04

## 2019-10-13 RX ADMIN — Medication 8 UNIT(S): at 12:48

## 2019-10-13 RX ADMIN — HEPARIN SODIUM 5000 UNIT(S): 5000 INJECTION INTRAVENOUS; SUBCUTANEOUS at 22:08

## 2019-10-13 RX ADMIN — CARBIDOPA AND LEVODOPA 2 TABLET(S): 25; 100 TABLET ORAL at 06:50

## 2019-10-13 RX ADMIN — Medication 325 MILLIGRAM(S): at 12:54

## 2019-10-13 RX ADMIN — SODIUM CHLORIDE 3 MILLILITER(S): 9 INJECTION INTRAMUSCULAR; INTRAVENOUS; SUBCUTANEOUS at 21:50

## 2019-10-13 RX ADMIN — CARBIDOPA AND LEVODOPA 2 TABLET(S): 25; 100 TABLET ORAL at 22:08

## 2019-10-13 RX ADMIN — Medication 2: at 17:40

## 2019-10-13 RX ADMIN — ATORVASTATIN CALCIUM 80 MILLIGRAM(S): 80 TABLET, FILM COATED ORAL at 22:08

## 2019-10-13 RX ADMIN — Medication 2 MILLIGRAM(S): at 06:51

## 2019-10-13 RX ADMIN — AMIODARONE HYDROCHLORIDE 200 MILLIGRAM(S): 400 TABLET ORAL at 06:50

## 2019-10-13 RX ADMIN — Medication 100 MILLIGRAM(S): at 06:50

## 2019-10-13 RX ADMIN — Medication 1 MILLIGRAM(S): at 12:54

## 2019-10-13 RX ADMIN — ENTACAPONE 200 MILLIGRAM(S): 200 TABLET, FILM COATED ORAL at 12:49

## 2019-10-13 RX ADMIN — SODIUM CHLORIDE 3 MILLILITER(S): 9 INJECTION INTRAMUSCULAR; INTRAVENOUS; SUBCUTANEOUS at 06:11

## 2019-10-13 RX ADMIN — Medication 25 MILLIGRAM(S): at 14:54

## 2019-10-13 RX ADMIN — Medication 25 MILLIGRAM(S): at 06:51

## 2019-10-13 RX ADMIN — Medication 50 MICROGRAM(S): at 06:51

## 2019-10-13 RX ADMIN — Medication 2 MILLIGRAM(S): at 22:08

## 2019-10-13 RX ADMIN — Medication 2 MILLIGRAM(S): at 14:54

## 2019-10-13 RX ADMIN — CARBIDOPA AND LEVODOPA 2 TABLET(S): 25; 100 TABLET ORAL at 16:40

## 2019-10-13 RX ADMIN — HEPARIN SODIUM 5000 UNIT(S): 5000 INJECTION INTRAVENOUS; SUBCUTANEOUS at 06:50

## 2019-10-13 RX ADMIN — Medication 7 UNIT(S): at 08:04

## 2019-10-13 RX ADMIN — Medication 25 MILLIGRAM(S): at 22:08

## 2019-10-13 RX ADMIN — HEPARIN SODIUM 5000 UNIT(S): 5000 INJECTION INTRAVENOUS; SUBCUTANEOUS at 14:54

## 2019-10-13 RX ADMIN — Medication 8 UNIT(S): at 17:40

## 2019-10-13 RX ADMIN — PANTOPRAZOLE SODIUM 40 MILLIGRAM(S): 20 TABLET, DELAYED RELEASE ORAL at 06:51

## 2019-10-13 RX ADMIN — Medication 100 MILLIGRAM(S): at 22:08

## 2019-10-13 RX ADMIN — CHLORHEXIDINE GLUCONATE 1 APPLICATION(S): 213 SOLUTION TOPICAL at 14:37

## 2019-10-13 RX ADMIN — Medication 100 MILLIGRAM(S): at 14:54

## 2019-10-13 RX ADMIN — SODIUM CHLORIDE 3 MILLILITER(S): 9 INJECTION INTRAMUSCULAR; INTRAVENOUS; SUBCUTANEOUS at 14:55

## 2019-10-13 RX ADMIN — INSULIN GLARGINE 22 UNIT(S): 100 INJECTION, SOLUTION SUBCUTANEOUS at 22:09

## 2019-10-13 NOTE — PROGRESS NOTE ADULT - ATTENDING COMMENTS
Agree with above  continue with medical management of cad with asa/statin  supportive care per CTS    Apple Feliz MD

## 2019-10-13 NOTE — PROGRESS NOTE ADULT - ASSESSMENT
Assessment  DMT2: 69y Male with DM T2 with hyperglycemia, A1C 7.2%, was on oral meds and insulin at home, S/P CABG on insulin, no new hypoglycemic events, FS trending up, eating full meals, appears lethargic. Planning d/c to acute rehab once medically cleared.  Hypothyroidism: Patient has no hx thyroid dz, was not on any thyroid supplements, TSH 6.22 was started on synthroid 50mcg PO daily. New TFTs: TSH 4.53, free thyroxine 1.5.  CAD: postop CABG 10/2, on medications, no chest pain, stable, monitored.  HTN: Controlled,  on antihypertensive medications.  Parkinson's: on meds, stable.          Kelsie Reece MD  Cell: [phone]  Office: [phone] Assessment  DMT2: 69y Male with DM T2 with hyperglycemia, A1C 7.2%, was on oral meds and insulin at home, S/P CABG on insulin,  no new hypoglycemic events, FS trending up, eating full meals, appears lethargic. Planning d/c to acute rehab once medically cleared.  Hypothyroidism: Patient has no hx thyroid dz, was not on any thyroid supplements, TSH 6.22 was started on synthroid 50mcg PO daily. New TFTs: TSH 4.53, free thyroxine 1.5.  CAD: postop CABG 10/2, on medications, no chest pain, stable, monitored.  HTN: Controlled,  on antihypertensive medications.  Parkinson's: on meds, stable.          Kelsie Reece MD  Cell: [phone]  Office: [phone]

## 2019-10-13 NOTE — PROGRESS NOTE ADULT - ASSESSMENT
68 yo M PMHx CAD s/p stent x 1 (2010), T2DM, HTN, Parkinson's presenting with c/o 10/10 chest pain described as burning radiating from abdomen to midsternum x 1 day. Patient reports symptoms started at 2 pm and resolved after 1 hour. Chest pain began after he ate lunch and was walking up a flight of stairs. He reports chest pain was associated with dyspnea, diaphoresis, and nausea. At baseline, he ambulates with cane/walker. Takes medications daily including aspirin and prasugrel. On ROS, reports chronic left leg swelling x 1-2 years. Pt s/p cardiac cath at [facility] found to have multivessel disease requiring transfer to [facility] CT surgery service.   9/26 Nephrology consulted, aldactone discontinued. Pre op Transthoracic Echocardiogram Mitral annular calcification, otherwise normal mitral  valve. Calcified trileaflet aortic valve with normal opening. Mild aortic regurgitation. Mild concentric left ventricular hypertrophy. Endocardium not well visualized; grossly normal left ventricular systolic function.  9/27 VVS; Pre op Cardiac Surgery work up in progress. PT evaluation to assess pre op baseline activity level.  Plan for cardiac surgery in next week Wednesday with Dr. Guillory.  On 10/2/19 C5L LIMA to LAD, SVG to RPDA, SVG to RPL, SVG to Ramus, SVG to OM.   Post op Course:   Extubated on POD # 1  Pressor support required à weaned off   (+) dizziness à Neurology following à Unclear of etiology   CKD à Renal following; on Torsemide 10 mg PO daily    Hyperglycemia / Hypothyroid à Endo following. Continue on Synthroid 50 mcg PO daily.   10/7 Transferred to [facility].   10/8 VSS - SR 58 - pt offers no complaints- alex patent, d/c plan to rehab  10/9 HD stable. Pt yet to have BM post-op-given mag citrate this PM.   Javier d/c'd today. Voiding well post-removal.   Discharge planning-*change* re-eval-acute rehab.   10/10: HD stable. CXR today-small left pleural effusion-encouraging improved pulm toilet.  Voiding well since Javier dc'd yesterday.  Stable BUN/Creat  BM x3 overnight after mag citrate yesterday PM.   Discharge planning-acute rehab.   10/11 VSS no events overnight awaiting PMR for dispo recs                                                                                                                               10/12: Increase in creatinine today. D/w with Guillory- will continue to watch for now-renal f/u                                                                                  10/13: downward trend in creatinine today.  pt more puffy today renal f/u re restarting  diuretic.

## 2019-10-13 NOTE — PROGRESS NOTE ADULT - PROBLEM SELECTOR PLAN 1
Will increase Lantus to 22u at bedtime, increase Humalog to 8u at bedtime, and continue coverage scale. Will continue monitoring FS, log, and FU.  Patient counseled for compliance with consistent low carb diet. Will continue monitoring FS, log, and FU.  Patient counseled for compliance with consistent low carb diet.

## 2019-10-13 NOTE — PROGRESS NOTE ADULT - SUBJECTIVE AND OBJECTIVE BOX
pt seen and examined, no complaints, ROS - .     Tele stable overnight          acetaminophen   Tablet .. 325 milliGRAM(s) Oral once  amiodarone    Tablet 200 milliGRAM(s) Oral daily  aspirin enteric coated 325 milliGRAM(s) Oral daily  atorvastatin 80 milliGRAM(s) Oral at bedtime  bisacodyl Suppository 10 milliGRAM(s) Rectal once  carbidopa/levodopa  25/100 2 Tablet(s) Oral three times a day  chlorhexidine 2% Cloths 1 Application(s) Topical <User Schedule>  dextrose 40% Gel 15 Gram(s) Oral once PRN  dextrose 5%. 1000 milliLiter(s) IV Continuous <Continuous>  dextrose 50% Injectable 12.5 Gram(s) IV Push once  dextrose 50% Injectable 25 Gram(s) IV Push once  dextrose 50% Injectable 25 Gram(s) IV Push once  dextrose 50% Injectable 50 milliLiter(s) IV Push every 15 minutes  docusate sodium 100 milliGRAM(s) Oral three times a day  entacapone 200 milliGRAM(s) Oral daily  epoetin mari Injectable 4000 Unit(s) SubCutaneous every 7 days  ferrous    sulfate 325 milliGRAM(s) Oral daily  folic acid 1 milliGRAM(s) Oral daily  glucagon  Injectable 1 milliGRAM(s) IntraMuscular once PRN  heparin  Injectable 5000 Unit(s) SubCutaneous every 8 hours  insulin glargine Injectable (LANTUS) 18 Unit(s) SubCutaneous at bedtime  insulin lispro (HumaLOG) corrective regimen sliding scale   SubCutaneous three times a day before meals  insulin lispro (HumaLOG) corrective regimen sliding scale   SubCutaneous at bedtime  insulin lispro Injectable (HumaLOG) 7 Unit(s) SubCutaneous three times a day before meals  levothyroxine 50 MICROGram(s) Oral daily  metoprolol tartrate 25 milliGRAM(s) Oral every 8 hours  pantoprazole    Tablet 40 milliGRAM(s) Oral before breakfast  polyethylene glycol 3350 17 Gram(s) Oral daily  sodium chloride 0.9% lock flush 3 milliLiter(s) IV Push every 8 hours  trihexyphenidyl 2 milliGRAM(s) Oral three times a day                            9.6    11.70 )-----------( 377      ( 12 Oct 2019 06:02 )             30.4       Hemoglobin: 9.6 g/dL (10-12 @ 06:02)  Hemoglobin: 10.0 g/dL (10-11 @ 06:34)  Hemoglobin: 10.0 g/dL (10-10 @ 06:32)  Hemoglobin: 9.5 g/dL (10-09 @ 05:50)  Hemoglobin: 9.1 g/dL (10-08 @ 07:28)      10-12    135  |  96  |  54<H>  ----------------------------<  120<H>  4.3   |  26  |  3.79<H>    Ca    8.8      12 Oct 2019 06:02      Creatinine Trend: 3.79<--, 3.56<--, 3.27<--, 3.32<--, 3.44<--, 3.13<--    COAGS:           T(C): 36.6 (10-13-19 @ 05:00), Max: 36.9 (10-12-19 @ 14:47)  HR: 62 (10-13-19 @ 05:00) (59 - 64)  BP: 125/75 (10-13-19 @ 05:00) (102/62 - 132/74)  RR: 18 (10-13-19 @ 05:00) (18 - 20)  SpO2: 97% (10-13-19 @ 05:00) (88% - 99%)  Wt(kg): --    I&O's Summary    11 Oct 2019 07:01  -  12 Oct 2019 07:00  --------------------------------------------------------  IN: 1080 mL / OUT: 950 mL / NET: 130 mL    12 Oct 2019 07:01  -  13 Oct 2019 05:40  --------------------------------------------------------  IN: 840 mL / OUT: 1300 mL / NET: -460 mL      Gen: Appears well in NAD  HEENT:  (-)icterus (-)pallor  CV: N S1 S2 1/6 JENNIFER (+)2 Pulses B/l  Resp:  Clear to auscultation B/L, normal effort  GI: (+) BS Soft, NT, ND  Lymph:  (+) B/L LE Edema, (-)obvious lymphadenopathy  Skin: Warm to touch, Normal turgor  Psych: Appropriate mood and affect      TELEMETRY: SB/SR 50-70        DIAGNOSTIC TESTING:  [ ] Echocardiogram: < from: Intra-Operative Transesophageal Echo (10.02.19 @ 13:30) >  Conclusions:  1. Mild segmental left ventricular systolic dysfunction.  Apicl hypokinesis  2. Moderate diastolic dysfunction (Stage II).  Confirmed on  10/2/2019 - 14:16:07 by Daniel Duran M.D.  ------------------------------------------------------------------------    < end of copied text >      ASSESSMENT/PLAN:   70 yo M PMHx CAD s/p stent x 1 (2010), T2DM, HTN, Parkinson's, history of meningioma, admitted with NSTEMI, TTE with mild segmental LV dysfxn, cath with multivessel CAD, s/p C5L LIMA-LAD, SVG-RPDA, SVG-RPL, SVG-Ramus, SVG-OM 10/2    -  no arrythmia on tele overnight   - Cont ASA, statin   - tolerating AMIO, BB * hr stable   - neuro follow up for parkinson management   - GI / DVT prophylaxis, - keep K>4, mg >2.0   - Renal f/u - trend creatinine, off diuretics at present   - Supportive care per CTS appreciated  - Patient to f/u with his cardiologist Dr. Wagner Simons after discharge

## 2019-10-13 NOTE — PROGRESS NOTE ADULT - SUBJECTIVE AND OBJECTIVE BOX
Patient seen and examined    REVIEW OF SYSTEMS:  As per HPI, otherwise 8 full 10 ROS were unremarkable    MEDICATIONS  (STANDING):  acetaminophen   Tablet .. 325 milliGRAM(s) Oral once  amiodarone    Tablet 200 milliGRAM(s) Oral daily  aspirin enteric coated 325 milliGRAM(s) Oral daily  atorvastatin 80 milliGRAM(s) Oral at bedtime  bisacodyl Suppository 10 milliGRAM(s) Rectal once  carbidopa/levodopa  25/100 2 Tablet(s) Oral three times a day  chlorhexidine 2% Cloths 1 Application(s) Topical <User Schedule>  dextrose 5%. 1000 milliLiter(s) (50 mL/Hr) IV Continuous <Continuous>  dextrose 50% Injectable 12.5 Gram(s) IV Push once  dextrose 50% Injectable 25 Gram(s) IV Push once  dextrose 50% Injectable 25 Gram(s) IV Push once  dextrose 50% Injectable 50 milliLiter(s) IV Push every 15 minutes  docusate sodium 100 milliGRAM(s) Oral three times a day  entacapone 200 milliGRAM(s) Oral daily  epoetin mari Injectable 4000 Unit(s) SubCutaneous every 7 days  ferrous    sulfate 325 milliGRAM(s) Oral daily  folic acid 1 milliGRAM(s) Oral daily  heparin  Injectable 5000 Unit(s) SubCutaneous every 8 hours  insulin glargine Injectable (LANTUS) 22 Unit(s) SubCutaneous at bedtime  insulin lispro (HumaLOG) corrective regimen sliding scale   SubCutaneous three times a day before meals  insulin lispro (HumaLOG) corrective regimen sliding scale   SubCutaneous at bedtime  insulin lispro Injectable (HumaLOG) 8 Unit(s) SubCutaneous three times a day before meals  levothyroxine 50 MICROGram(s) Oral daily  metoprolol tartrate 25 milliGRAM(s) Oral every 8 hours  pantoprazole    Tablet 40 milliGRAM(s) Oral before breakfast  polyethylene glycol 3350 17 Gram(s) Oral daily  sodium chloride 0.9% lock flush 3 milliLiter(s) IV Push every 8 hours  trihexyphenidyl 2 milliGRAM(s) Oral three times a day      VITAL:  T(C): , Max: 36.9 (10-12-19 @ 14:47)  T(F): , Max: 98.4 (10-12-19 @ 14:47)  HR: 62 (10-13-19 @ 05:00)  BP: 125/75 (10-13-19 @ 05:00)  BP(mean): --  RR: 18 (10-13-19 @ 05:00)  SpO2: 97% (10-13-19 @ 05:00)  Wt(kg): --    I and O's:    10-12 @ 07:01  -  10-13 @ 07:00  --------------------------------------------------------  IN: 840 mL / OUT: 1300 mL / NET: -460 mL    10-13 @ 07:01  -  10-13 @ 13:24  --------------------------------------------------------  IN: 240 mL / OUT: 650 mL / NET: -410 mL          PHYSICAL EXAM:    Constitutional: NAD  HEENT: PERRLA, EOMI,  MMM  Neck: No LAD, No JVD  Respiratory: CTAB  Cardiovascular: S1 and S2  Gastrointestinal: BS+, soft, NT/ND  Extremities: + b/l l/e edema  Neurological: A/O x 3, no focal deficits  Psychiatric: Normal mood, normal affect  : No Johnson  Skin: No rashes  Access: Not applicable      LABS:                        9.7    11.46 )-----------( 367      ( 13 Oct 2019 06:53 )             29.2     10-13    134<L>  |  94<L>  |  52<H>  ----------------------------<  136<H>  4.3   |  27  |  3.67<H>    Ca    9.3      13 Oct 2019 06:53        Urine Studies:    Creatinine, Random Urine: 86 mg/dL (10-12 @ 16:10)  Sodium, Random Urine: 39 mmol/L (10-12 @ 16:10)  Potassium, Random Urine: 32 mmol/L (10-12 @ 16:10)  Chloride, Random Urine: <35 mmol/L (10-12 @ 16:10)      Assessment and Plan:   · Assessment	  68 yo M PMHx CAD s/p stent x 1 (2010), T2DM, HTN, Parkinson's and CKD stage 3b presents with chest pain. S/P cath with MVD- POD #9  CABG  Subnephrotic range proteinuria     1. Renal - [id number].  Trend BMP and urine output- patient reports periods of incontinence; when able to go into johnson catheter; bladder scan done yesterday to r/o rentention no residual noted.  Azotemia slowly improving     2. CVS   BP is stable     3.. Hyponatremia- hypervolemic; acceptable for now     4. Anemia s/p Epogen; Hgb slowly improving     Recommendations  - Trend renal profile.  - Fluid restriction 1.2 L per day   - diuretics have been on hold dt azotemia; Patient seen and examined sitting in chair; breathing evenly & non-labored.  NAD.    REVIEW OF SYSTEMS:  As per HPI, otherwise 8 full 10 ROS were unremarkable    MEDICATIONS  (STANDING):  acetaminophen   Tablet .. 325 milliGRAM(s) Oral once  amiodarone    Tablet 200 milliGRAM(s) Oral daily  aspirin enteric coated 325 milliGRAM(s) Oral daily  atorvastatin 80 milliGRAM(s) Oral at bedtime  bisacodyl Suppository 10 milliGRAM(s) Rectal once  carbidopa/levodopa  25/100 2 Tablet(s) Oral three times a day  chlorhexidine 2% Cloths 1 Application(s) Topical <User Schedule>  dextrose 5%. 1000 milliLiter(s) (50 mL/Hr) IV Continuous <Continuous>  dextrose 50% Injectable 12.5 Gram(s) IV Push once  dextrose 50% Injectable 25 Gram(s) IV Push once  dextrose 50% Injectable 25 Gram(s) IV Push once  dextrose 50% Injectable 50 milliLiter(s) IV Push every 15 minutes  docusate sodium 100 milliGRAM(s) Oral three times a day  entacapone 200 milliGRAM(s) Oral daily  epoetin mari Injectable 4000 Unit(s) SubCutaneous every 7 days  ferrous    sulfate 325 milliGRAM(s) Oral daily  folic acid 1 milliGRAM(s) Oral daily  heparin  Injectable 5000 Unit(s) SubCutaneous every 8 hours  insulin glargine Injectable (LANTUS) 22 Unit(s) SubCutaneous at bedtime  insulin lispro (HumaLOG) corrective regimen sliding scale   SubCutaneous three times a day before meals  insulin lispro (HumaLOG) corrective regimen sliding scale   SubCutaneous at bedtime  insulin lispro Injectable (HumaLOG) 8 Unit(s) SubCutaneous three times a day before meals  levothyroxine 50 MICROGram(s) Oral daily  metoprolol tartrate 25 milliGRAM(s) Oral every 8 hours  pantoprazole    Tablet 40 milliGRAM(s) Oral before breakfast  polyethylene glycol 3350 17 Gram(s) Oral daily  sodium chloride 0.9% lock flush 3 milliLiter(s) IV Push every 8 hours  trihexyphenidyl 2 milliGRAM(s) Oral three times a day      VITAL:  T(C): , Max: 36.9 (10-12-19 @ 14:47)  T(F): , Max: 98.4 (10-12-19 @ 14:47)  HR: 62 (10-13-19 @ 05:00)  BP: 125/75 (10-13-19 @ 05:00)  BP(mean): --  RR: 18 (10-13-19 @ 05:00)  SpO2: 97% (10-13-19 @ 05:00)  Wt(kg): --    I and O's:    10-12 @ 07:01  -  10-13 @ 07:00  --------------------------------------------------------  IN: 840 mL / OUT: 1300 mL / NET: -460 mL    10-13 @ 07:01  -  10-13 @ 13:24  --------------------------------------------------------  IN: 240 mL / OUT: 650 mL / NET: -410 mL          PHYSICAL EXAM:    Constitutional: NAD  HEENT: PERRLA, EOMI,  MMM  Neck: No LAD, No JVD  Respiratory: diminished b/l   Cardiovascular: S1 and S2  Gastrointestinal: BS+, soft, NT/ND  Extremities: + b/l l/e edema  Neurological: A/O x 3, no focal deficits  Psychiatric: Normal mood, normal affect  : No Johnson  Skin: No rashes  Access: Not applicable      LABS:                        9.7    11.46 )-----------( 367      ( 13 Oct 2019 06:53 )             29.2     10-13    134<L>  |  94<L>  |  52<H>  ----------------------------<  136<H>  4.3   |  27  |  3.67<H>    Ca    9.3      13 Oct 2019 06:53        Urine Studies:    Creatinine, Random Urine: 86 mg/dL (10-12 @ 16:10)  Sodium, Random Urine: 39 mmol/L (10-12 @ 16:10)  Potassium, Random Urine: 32 mmol/L (10-12 @ 16:10)  Chloride, Random Urine: <35 mmol/L (10-12 @ 16:10)      Assessment and Plan:   · Assessment	  70 yo M PMHx CAD s/p stent x 1 (2010), T2DM, HTN, Parkinson's and CKD stage 3b presents with chest pain. S/P cath with MVD- POD #9  CABG  Subnephrotic range proteinuria     1. Renal - [id number].  Trend BMP and urine output- patient reports periods of incontinence; when able to go into johnson catheter; bladder scan done yesterday to r/o rentention no residual noted.  Azotemia slowly improving     2. CVS   BP is stable     3.. Hyponatremia- hypervolemic; acceptable for now     4. Anemia s/p Epogen; Hgb slowly improving     Recommendations  - Trend renal profile.  - Fluid restriction 1.2 L per day   - diuretics have been on hold dt azotemia; patient breathing evenly & non-labored; would favor restarting diuretics if creatinine begins to trend down in am

## 2019-10-13 NOTE — PROGRESS NOTE ADULT - ATTENDING COMMENTS
Will increase Lantus to 22u at bedtime, increase Humalog to 8u at bedtime, and continue coverage scale.

## 2019-10-13 NOTE — PROGRESS NOTE ADULT - SUBJECTIVE AND OBJECTIVE BOX
VITAL SIGNS    Telemetry:    Vital Signs Last 24 Hrs  T(C): 36.6 (10-13-19 @ 05:00), Max: 36.9 (10-12-19 @ 14:47)  T(F): 97.8 (10-13-19 @ 05:00), Max: 98.4 (10-12-19 @ 14:47)  HR: 62 (10-13-19 @ 05:00) (59 - 64)  BP: 125/75 (10-13-19 @ 05:00) (118/63 - 132/74)  RR: 18 (10-13-19 @ 05:00) (18 - 18)  SpO2: 97% (10-13-19 @ 05:00) (96% - 99%)            10-12 @ 07:01  -  10-13 @ 07:00  --------------------------------------------------------  IN: 840 mL / OUT: 1300 mL / NET: -460 mL    10-13 @ 07:01  -  10-13 @ 09:49  --------------------------------------------------------  IN: 240 mL / OUT: 450 mL / NET: -210 mL       Daily     Daily Weight in k.5 (13 Oct 2019 09:24)  Admit Wt: Drug Dosing Weight  Height (cm): 180.34 (02 Oct 2019 08:25)  Weight (kg): 109.8 (11 Oct 2019 07:57)  BMI (kg/m2): 33.8 (11 Oct 2019 07:57)  BSA (m2): 2.29 (11 Oct 2019 07:57)     Daily Weight in k.5 (10-13-19 @ 09:24)    LABS  10-13    134<L>  |  94<L>  |  52<H>  ----------------------------<  136<H>  4.3   |  27  |  3.67<H>    Ca    9.3      13 Oct 2019 06:53                                   9.7    11.46 )-----------( 367      ( 13 Oct 2019 06:53 )             29.2                    CAPILLARY BLOOD GLUCOSE      POCT Blood Glucose.: 164 mg/dL (13 Oct 2019 07:53)  POCT Blood Glucose.: 151 mg/dL (12 Oct 2019 21:34)  POCT Blood Glucose.: 151 mg/dL (12 Oct 2019 17:06)  POCT Blood Glucose.: 174 mg/dL (12 Oct 2019 12:07)          Drains:     MS       [  ]   [  ]            L Pleural [  ]            R Pleural  [  ]            BRANDON  [  ]           Javier  [  ]    Pacing Wires      [  ]   Settings:                                  Isolated  [  ]                    CXR:      MEDICATIONS  acetaminophen   Tablet .. 325 milliGRAM(s) Oral once  amiodarone    Tablet 200 milliGRAM(s) Oral daily  aspirin enteric coated 325 milliGRAM(s) Oral daily  atorvastatin 80 milliGRAM(s) Oral at bedtime  bisacodyl Suppository 10 milliGRAM(s) Rectal once  carbidopa/levodopa  25/100 2 Tablet(s) Oral three times a day  chlorhexidine 2% Cloths 1 Application(s) Topical <User Schedule>  dextrose 40% Gel 15 Gram(s) Oral once PRN  dextrose 5%. 1000 milliLiter(s) IV Continuous <Continuous>  dextrose 50% Injectable 12.5 Gram(s) IV Push once  dextrose 50% Injectable 25 Gram(s) IV Push once  dextrose 50% Injectable 25 Gram(s) IV Push once  dextrose 50% Injectable 50 milliLiter(s) IV Push every 15 minutes  docusate sodium 100 milliGRAM(s) Oral three times a day  entacapone 200 milliGRAM(s) Oral daily  epoetin mari Injectable 4000 Unit(s) SubCutaneous every 7 days  ferrous    sulfate 325 milliGRAM(s) Oral daily  folic acid 1 milliGRAM(s) Oral daily  glucagon  Injectable 1 milliGRAM(s) IntraMuscular once PRN  heparin  Injectable 5000 Unit(s) SubCutaneous every 8 hours  insulin glargine Injectable (LANTUS) 22 Unit(s) SubCutaneous at bedtime  insulin lispro (HumaLOG) corrective regimen sliding scale   SubCutaneous three times a day before meals  insulin lispro (HumaLOG) corrective regimen sliding scale   SubCutaneous at bedtime  insulin lispro Injectable (HumaLOG) 8 Unit(s) SubCutaneous three times a day before meals  levothyroxine 50 MICROGram(s) Oral daily  metoprolol tartrate 25 milliGRAM(s) Oral every 8 hours  pantoprazole    Tablet 40 milliGRAM(s) Oral before breakfast  polyethylene glycol 3350 17 Gram(s) Oral daily  sodium chloride 0.9% lock flush 3 milliLiter(s) IV Push every 8 hours  trihexyphenidyl 2 milliGRAM(s) Oral three times a day      PHYSICAL EXAM      Neurology: alert and oriented x 3, nonfocal, no gross deficits  CV :S1S2  Sternal Wound :  CDI , Stable  Lungs:   Abdomen: soft, nontender, nondistended, positive bowel sounds, last bowel movement   :       Extremities:                  PAST MEDICAL & SURGICAL HISTORY:  Shoulder pain, left  Osteoarthritis  Panic attacks  Urinary frequency  Urinary incontinence  Nocturia: x2-3  Gastroesophageal reflux disease without esophagitis: diet controlled  Vertigo: not improved with meclizine in past  Insomnia  Autoimmune disease: started on prednisone on 12/23/15 for &quot;inflammation&quot; but does not know diagnosis  Leg swelling: left leg  CAD (coronary artery disease): s/p 1 stent in   Parkinson disease  Hypercholesterolemia  Diabetes Mellitus, Type 2  Hypertension  S/P angioplasty with stent: 1 stent, RCA  Status Post Cardiac Catheterization                 Discussed with Cardiothoracic Team at AM rounds.

## 2019-10-13 NOTE — PROGRESS NOTE ADULT - SUBJECTIVE AND OBJECTIVE BOX
Chief complaint  Patient is a 69y old  Male who presents with a chief complaint of Multivessel disease (13 Oct 2019 09:48)   Review of systems  Patient in bed, looks comfortable, no fever, no hypoglycemia.    Labs and Fingersticks  CAPILLARY BLOOD GLUCOSE      POCT Blood Glucose.: 164 mg/dL (13 Oct 2019 07:53)  POCT Blood Glucose.: 151 mg/dL (12 Oct 2019 21:34)  POCT Blood Glucose.: 151 mg/dL (12 Oct 2019 17:06)  POCT Blood Glucose.: 174 mg/dL (12 Oct 2019 12:07)      Anion Gap, Serum: 13 (10-13 @ 06:53)  Anion Gap, Serum: 13 (10-12 @ 06:02)      Calcium, Total Serum: 9.3 (10-13 @ 06:53)  Calcium, Total Serum: 8.8 (10-12 @ 06:02)          10-13    134<L>  |  94<L>  |  52<H>  ----------------------------<  136<H>  4.3   |  27  |  3.67<H>    Ca    9.3      13 Oct 2019 06:53                          9.7    11.46 )-----------( 367      ( 13 Oct 2019 06:53 )             29.2     Medications  MEDICATIONS  (STANDING):  acetaminophen   Tablet .. 325 milliGRAM(s) Oral once  amiodarone    Tablet 200 milliGRAM(s) Oral daily  aspirin enteric coated 325 milliGRAM(s) Oral daily  atorvastatin 80 milliGRAM(s) Oral at bedtime  bisacodyl Suppository 10 milliGRAM(s) Rectal once  carbidopa/levodopa  25/100 2 Tablet(s) Oral three times a day  chlorhexidine 2% Cloths 1 Application(s) Topical <User Schedule>  dextrose 5%. 1000 milliLiter(s) (50 mL/Hr) IV Continuous <Continuous>  dextrose 50% Injectable 12.5 Gram(s) IV Push once  dextrose 50% Injectable 25 Gram(s) IV Push once  dextrose 50% Injectable 25 Gram(s) IV Push once  dextrose 50% Injectable 50 milliLiter(s) IV Push every 15 minutes  docusate sodium 100 milliGRAM(s) Oral three times a day  entacapone 200 milliGRAM(s) Oral daily  epoetin mari Injectable 4000 Unit(s) SubCutaneous every 7 days  ferrous    sulfate 325 milliGRAM(s) Oral daily  folic acid 1 milliGRAM(s) Oral daily  heparin  Injectable 5000 Unit(s) SubCutaneous every 8 hours  insulin glargine Injectable (LANTUS) 22 Unit(s) SubCutaneous at bedtime  insulin lispro (HumaLOG) corrective regimen sliding scale   SubCutaneous three times a day before meals  insulin lispro (HumaLOG) corrective regimen sliding scale   SubCutaneous at bedtime  insulin lispro Injectable (HumaLOG) 8 Unit(s) SubCutaneous three times a day before meals  levothyroxine 50 MICROGram(s) Oral daily  metoprolol tartrate 25 milliGRAM(s) Oral every 8 hours  pantoprazole    Tablet 40 milliGRAM(s) Oral before breakfast  polyethylene glycol 3350 17 Gram(s) Oral daily  sodium chloride 0.9% lock flush 3 milliLiter(s) IV Push every 8 hours  trihexyphenidyl 2 milliGRAM(s) Oral three times a day      Physical Exam  General: Patient comfortable in bed  Vital Signs Last 12 Hrs  T(F): 97.8 (10-13-19 @ 05:00), Max: 97.8 (10-13-19 @ 05:00)  HR: 62 (10-13-19 @ 05:00) (62 - 62)  BP: 125/75 (10-13-19 @ 05:00) (125/75 - 125/75)  BP(mean): --  RR: 18 (10-13-19 @ 05:00) (18 - 18)  SpO2: 97% (10-13-19 @ 05:00) (97% - 97%)  Neck: No palpable thyroid nodules.  CVS: S1S2, No murmurs  Respiratory: No wheezing, no crepitations  GI: Abdomen soft, bowel sounds positive  Musculoskeletal:  edema lower extremities.   Skin: No skin rashes, no ecchymosis    Diagnostics    Free Thyroxine, Serum: AM Sched. Collection: 30-Sep-2019 06:00 (09-29 @ 10:47)  Free Thyroxine, Serum: AM Sched. Collection: 11-Oct-2019 06:00 (10-10 @ 12:19)  Thyroid Stimulating Hormone, Serum: AM Sched. Collection: 11-Oct-2019 06:00 (10-10 @ 12:19)  Cortisol AM, Serum: Routine (10-10 @ 12:19) Chief complaint  Patient is a 69y old  Male who presents with a chief complaint of Multivessel disease (13 Oct 2019 09:48)   Review of systems  Patient in bed, looks comfortable, no fever,  no hypoglycemia.    Labs and Fingersticks  CAPILLARY BLOOD GLUCOSE      POCT Blood Glucose.: 164 mg/dL (13 Oct 2019 07:53)  POCT Blood Glucose.: 151 mg/dL (12 Oct 2019 21:34)  POCT Blood Glucose.: 151 mg/dL (12 Oct 2019 17:06)  POCT Blood Glucose.: 174 mg/dL (12 Oct 2019 12:07)      Anion Gap, Serum: 13 (10-13 @ 06:53)  Anion Gap, Serum: 13 (10-12 @ 06:02)      Calcium, Total Serum: 9.3 (10-13 @ 06:53)  Calcium, Total Serum: 8.8 (10-12 @ 06:02)          10-13    134<L>  |  94<L>  |  52<H>  ----------------------------<  136<H>  4.3   |  27  |  3.67<H>    Ca    9.3      13 Oct 2019 06:53                          9.7    11.46 )-----------( 367      ( 13 Oct 2019 06:53 )             29.2     Medications  MEDICATIONS  (STANDING):  acetaminophen   Tablet .. 325 milliGRAM(s) Oral once  amiodarone    Tablet 200 milliGRAM(s) Oral daily  aspirin enteric coated 325 milliGRAM(s) Oral daily  atorvastatin 80 milliGRAM(s) Oral at bedtime  bisacodyl Suppository 10 milliGRAM(s) Rectal once  carbidopa/levodopa  25/100 2 Tablet(s) Oral three times a day  chlorhexidine 2% Cloths 1 Application(s) Topical <User Schedule>  dextrose 5%. 1000 milliLiter(s) (50 mL/Hr) IV Continuous <Continuous>  dextrose 50% Injectable 12.5 Gram(s) IV Push once  dextrose 50% Injectable 25 Gram(s) IV Push once  dextrose 50% Injectable 25 Gram(s) IV Push once  dextrose 50% Injectable 50 milliLiter(s) IV Push every 15 minutes  docusate sodium 100 milliGRAM(s) Oral three times a day  entacapone 200 milliGRAM(s) Oral daily  epoetin mari Injectable 4000 Unit(s) SubCutaneous every 7 days  ferrous    sulfate 325 milliGRAM(s) Oral daily  folic acid 1 milliGRAM(s) Oral daily  heparin  Injectable 5000 Unit(s) SubCutaneous every 8 hours  insulin glargine Injectable (LANTUS) 22 Unit(s) SubCutaneous at bedtime  insulin lispro (HumaLOG) corrective regimen sliding scale   SubCutaneous three times a day before meals  insulin lispro (HumaLOG) corrective regimen sliding scale   SubCutaneous at bedtime  insulin lispro Injectable (HumaLOG) 8 Unit(s) SubCutaneous three times a day before meals  levothyroxine 50 MICROGram(s) Oral daily  metoprolol tartrate 25 milliGRAM(s) Oral every 8 hours  pantoprazole    Tablet 40 milliGRAM(s) Oral before breakfast  polyethylene glycol 3350 17 Gram(s) Oral daily  sodium chloride 0.9% lock flush 3 milliLiter(s) IV Push every 8 hours  trihexyphenidyl 2 milliGRAM(s) Oral three times a day      Physical Exam  General: Patient comfortable in bed  Vital Signs Last 12 Hrs  T(F): 97.8 (10-13-19 @ 05:00), Max: 97.8 (10-13-19 @ 05:00)  HR: 62 (10-13-19 @ 05:00) (62 - 62)  BP: 125/75 (10-13-19 @ 05:00) (125/75 - 125/75)  BP(mean): --  RR: 18 (10-13-19 @ 05:00) (18 - 18)  SpO2: 97% (10-13-19 @ 05:00) (97% - 97%)  Neck: No palpable thyroid nodules.  CVS: S1S2, No murmurs  Respiratory: No wheezing, no crepitations  GI: Abdomen soft, bowel sounds positive  Musculoskeletal:  edema lower extremities.   Skin: No skin rashes, no ecchymosis    Diagnostics    Free Thyroxine, Serum: AM Sched. Collection: 30-Sep-2019 06:00 (09-29 @ 10:47)  Free Thyroxine, Serum: AM Sched. Collection: 11-Oct-2019 06:00 (10-10 @ 12:19)  Thyroid Stimulating Hormone, Serum: AM Sched. Collection: 11-Oct-2019 06:00 (10-10 @ 12:19)  Cortisol AM, Serum: Routine (10-10 @ 12:19)

## 2019-10-14 LAB
ANION GAP SERPL CALC-SCNC: 13 MMOL/L — SIGNIFICANT CHANGE UP (ref 5–17)
BUN SERPL-MCNC: 48 MG/DL — HIGH (ref 7–23)
CALCIUM SERPL-MCNC: 9.2 MG/DL — SIGNIFICANT CHANGE UP (ref 8.4–10.5)
CHLORIDE SERPL-SCNC: 95 MMOL/L — LOW (ref 96–108)
CO2 SERPL-SCNC: 27 MMOL/L — SIGNIFICANT CHANGE UP (ref 22–31)
CREAT SERPL-MCNC: 3.66 MG/DL — HIGH (ref 0.5–1.3)
GLUCOSE SERPL-MCNC: 130 MG/DL — HIGH (ref 70–99)
HCT VFR BLD CALC: 28.8 % — LOW (ref 39–50)
HGB BLD-MCNC: 9.4 G/DL — LOW (ref 13–17)
MCHC RBC-ENTMCNC: 29.8 PG — SIGNIFICANT CHANGE UP (ref 27–34)
MCHC RBC-ENTMCNC: 32.6 GM/DL — SIGNIFICANT CHANGE UP (ref 32–36)
MCV RBC AUTO: 91.4 FL — SIGNIFICANT CHANGE UP (ref 80–100)
NRBC # BLD: 0 /100 WBCS — SIGNIFICANT CHANGE UP (ref 0–0)
PLATELET # BLD AUTO: 396 K/UL — SIGNIFICANT CHANGE UP (ref 150–400)
POTASSIUM SERPL-MCNC: 4 MMOL/L — SIGNIFICANT CHANGE UP (ref 3.5–5.3)
POTASSIUM SERPL-SCNC: 4 MMOL/L — SIGNIFICANT CHANGE UP (ref 3.5–5.3)
RBC # BLD: 3.15 M/UL — LOW (ref 4.2–5.8)
RBC # FLD: 14.3 % — SIGNIFICANT CHANGE UP (ref 10.3–14.5)
SODIUM SERPL-SCNC: 135 MMOL/L — SIGNIFICANT CHANGE UP (ref 135–145)
WBC # BLD: 11.85 K/UL — HIGH (ref 3.8–10.5)
WBC # FLD AUTO: 11.85 K/UL — HIGH (ref 3.8–10.5)

## 2019-10-14 RX ADMIN — AMIODARONE HYDROCHLORIDE 200 MILLIGRAM(S): 400 TABLET ORAL at 06:07

## 2019-10-14 RX ADMIN — CARBIDOPA AND LEVODOPA 2 TABLET(S): 25; 100 TABLET ORAL at 10:58

## 2019-10-14 RX ADMIN — Medication 2: at 12:05

## 2019-10-14 RX ADMIN — Medication 325 MILLIGRAM(S): at 11:03

## 2019-10-14 RX ADMIN — HEPARIN SODIUM 5000 UNIT(S): 5000 INJECTION INTRAVENOUS; SUBCUTANEOUS at 13:23

## 2019-10-14 RX ADMIN — Medication 2 MILLIGRAM(S): at 21:41

## 2019-10-14 RX ADMIN — Medication 100 MILLIGRAM(S): at 21:41

## 2019-10-14 RX ADMIN — Medication 8 UNIT(S): at 12:05

## 2019-10-14 RX ADMIN — PANTOPRAZOLE SODIUM 40 MILLIGRAM(S): 20 TABLET, DELAYED RELEASE ORAL at 06:07

## 2019-10-14 RX ADMIN — SODIUM CHLORIDE 3 MILLILITER(S): 9 INJECTION INTRAMUSCULAR; INTRAVENOUS; SUBCUTANEOUS at 21:40

## 2019-10-14 RX ADMIN — Medication 8 UNIT(S): at 17:58

## 2019-10-14 RX ADMIN — SODIUM CHLORIDE 3 MILLILITER(S): 9 INJECTION INTRAMUSCULAR; INTRAVENOUS; SUBCUTANEOUS at 13:20

## 2019-10-14 RX ADMIN — CHLORHEXIDINE GLUCONATE 1 APPLICATION(S): 213 SOLUTION TOPICAL at 07:59

## 2019-10-14 RX ADMIN — POLYETHYLENE GLYCOL 3350 17 GRAM(S): 17 POWDER, FOR SOLUTION ORAL at 11:03

## 2019-10-14 RX ADMIN — Medication 2 MILLIGRAM(S): at 06:07

## 2019-10-14 RX ADMIN — Medication 2 MILLIGRAM(S): at 13:23

## 2019-10-14 RX ADMIN — Medication 25 MILLIGRAM(S): at 06:07

## 2019-10-14 RX ADMIN — ENTACAPONE 200 MILLIGRAM(S): 200 TABLET, FILM COATED ORAL at 11:04

## 2019-10-14 RX ADMIN — Medication 100 MILLIGRAM(S): at 13:23

## 2019-10-14 RX ADMIN — Medication 100 MILLIGRAM(S): at 06:07

## 2019-10-14 RX ADMIN — SODIUM CHLORIDE 3 MILLILITER(S): 9 INJECTION INTRAMUSCULAR; INTRAVENOUS; SUBCUTANEOUS at 08:00

## 2019-10-14 RX ADMIN — Medication 50 MICROGRAM(S): at 06:07

## 2019-10-14 RX ADMIN — HEPARIN SODIUM 5000 UNIT(S): 5000 INJECTION INTRAVENOUS; SUBCUTANEOUS at 06:08

## 2019-10-14 RX ADMIN — Medication 25 MILLIGRAM(S): at 13:23

## 2019-10-14 RX ADMIN — Medication 10 MILLIGRAM(S): at 13:22

## 2019-10-14 RX ADMIN — Medication 1 MILLIGRAM(S): at 11:04

## 2019-10-14 RX ADMIN — HEPARIN SODIUM 5000 UNIT(S): 5000 INJECTION INTRAVENOUS; SUBCUTANEOUS at 21:41

## 2019-10-14 RX ADMIN — INSULIN GLARGINE 22 UNIT(S): 100 INJECTION, SOLUTION SUBCUTANEOUS at 22:25

## 2019-10-14 RX ADMIN — Medication 8 UNIT(S): at 08:00

## 2019-10-14 RX ADMIN — ATORVASTATIN CALCIUM 80 MILLIGRAM(S): 80 TABLET, FILM COATED ORAL at 21:41

## 2019-10-14 RX ADMIN — Medication 25 MILLIGRAM(S): at 21:41

## 2019-10-14 RX ADMIN — CARBIDOPA AND LEVODOPA 2 TABLET(S): 25; 100 TABLET ORAL at 19:10

## 2019-10-14 NOTE — PROGRESS NOTE ADULT - SUBJECTIVE AND OBJECTIVE BOX
NEPHROLOGY-[facility] [phone]        Patient seen and examined         MEDICATIONS  (STANDING):  acetaminophen   Tablet .. 325 milliGRAM(s) Oral once  amiodarone    Tablet 200 milliGRAM(s) Oral daily  aspirin enteric coated 325 milliGRAM(s) Oral daily  atorvastatin 80 milliGRAM(s) Oral at bedtime  bisacodyl Suppository 10 milliGRAM(s) Rectal once  carbidopa/levodopa  25/100 2 Tablet(s) Oral three times a day  chlorhexidine 2% Cloths 1 Application(s) Topical <User Schedule>  dextrose 5%. 1000 milliLiter(s) (50 mL/Hr) IV Continuous <Continuous>  dextrose 50% Injectable 12.5 Gram(s) IV Push once  dextrose 50% Injectable 25 Gram(s) IV Push once  dextrose 50% Injectable 25 Gram(s) IV Push once  dextrose 50% Injectable 50 milliLiter(s) IV Push every 15 minutes  docusate sodium 100 milliGRAM(s) Oral three times a day  entacapone 200 milliGRAM(s) Oral daily  epoetin mari Injectable 4000 Unit(s) SubCutaneous every 7 days  ferrous    sulfate 325 milliGRAM(s) Oral daily  folic acid 1 milliGRAM(s) Oral daily  heparin  Injectable 5000 Unit(s) SubCutaneous every 8 hours  insulin glargine Injectable (LANTUS) 22 Unit(s) SubCutaneous at bedtime  insulin lispro (HumaLOG) corrective regimen sliding scale   SubCutaneous three times a day before meals  insulin lispro (HumaLOG) corrective regimen sliding scale   SubCutaneous at bedtime  insulin lispro Injectable (HumaLOG) 8 Unit(s) SubCutaneous three times a day before meals  levothyroxine 50 MICROGram(s) Oral daily  metoprolol tartrate 25 milliGRAM(s) Oral every 8 hours  pantoprazole    Tablet 40 milliGRAM(s) Oral before breakfast  polyethylene glycol 3350 17 Gram(s) Oral daily  sodium chloride 0.9% lock flush 3 milliLiter(s) IV Push every 8 hours  trihexyphenidyl 2 milliGRAM(s) Oral three times a day      VITAL:  T(C): , Max: 36.7 (10-13-19 @ 20:25)  T(F): , Max: 98.1 (10-13-19 @ 20:25)  HR: 57 (10-14-19 @ 05:00)  BP: 133/73 (10-14-19 @ 05:00)  BP(mean): --  RR: 18 (10-14-19 @ 05:00)  SpO2: 97% (10-14-19 @ 05:00)  Wt(kg): --    I and O's:    10-13 @ 07:01  -  10-14 @ 07:00  --------------------------------------------------------  IN: 840 mL / OUT: 2000 mL / NET: -1160 mL    10-14 @ 07:01  -  10-14 @ 08:57  --------------------------------------------------------  IN: 0 mL / OUT: 300 mL / NET: -300 mL          PHYSICAL EXAM:    Constitutional: NAD  Neck:  No JVD  Respiratory: CTAB/L  Cardiovascular: S1 and S2  Gastrointestinal: BS+, soft, NT/ND  Extremities: No peripheral edema  Neurological: A/O x 3, no focal deficits  Psychiatric: Normal mood, normal affect  : No Javier  Skin: No rashes  Access: Not applicable    LABS:                        9.4    11.85 )-----------( 396      ( 14 Oct 2019 06:45 )             28.8     10-14    135  |  95<L>  |  48<H>  ----------------------------<  130<H>  4.0   |  27  |  3.66<H>    Ca    9.2      14 Oct 2019 06:45            Urine Studies:    Creatinine, Random Urine: 86 mg/dL (10-12 @ 16:10)  Sodium, Random Urine: 39 mmol/L (10-12 @ 16:10)  Potassium, Random Urine: 32 mmol/L (10-12 @ 16:10)  Chloride, Random Urine: <35 mmol/L (10-12 @ 16:10)        RADIOLOGY & ADDITIONAL STUDIES:

## 2019-10-14 NOTE — PROGRESS NOTE ADULT - SUBJECTIVE AND OBJECTIVE BOX
[facility]      Seen earlier today, and examined.  - Today, patient is without complaints.           *****MEDICATIONS: Current medication reviewed and documented.    MEDICATIONS  (STANDING):  acetaminophen   Tablet .. 325 milliGRAM(s) Oral once  amiodarone    Tablet 200 milliGRAM(s) Oral daily  aspirin enteric coated 325 milliGRAM(s) Oral daily  atorvastatin 80 milliGRAM(s) Oral at bedtime  bisacodyl Suppository 10 milliGRAM(s) Rectal once  carbidopa/levodopa  25/100 2 Tablet(s) Oral three times a day  chlorhexidine 2% Cloths 1 Application(s) Topical <User Schedule>  dextrose 5%. 1000 milliLiter(s) (50 mL/Hr) IV Continuous <Continuous>  dextrose 50% Injectable 12.5 Gram(s) IV Push once  dextrose 50% Injectable 25 Gram(s) IV Push once  dextrose 50% Injectable 25 Gram(s) IV Push once  dextrose 50% Injectable 50 milliLiter(s) IV Push every 15 minutes  docusate sodium 100 milliGRAM(s) Oral three times a day  epoetin mari Injectable 4000 Unit(s) SubCutaneous every 7 days  ferrous    sulfate 325 milliGRAM(s) Oral daily  folic acid 1 milliGRAM(s) Oral daily  heparin  Injectable 5000 Unit(s) SubCutaneous every 8 hours  insulin glargine Injectable (LANTUS) 22 Unit(s) SubCutaneous at bedtime  insulin lispro (HumaLOG) corrective regimen sliding scale   SubCutaneous three times a day before meals  insulin lispro (HumaLOG) corrective regimen sliding scale   SubCutaneous at bedtime  insulin lispro Injectable (HumaLOG) 8 Unit(s) SubCutaneous three times a day before meals  levothyroxine 50 MICROGram(s) Oral daily  metoprolol tartrate 25 milliGRAM(s) Oral every 8 hours  pantoprazole    Tablet 40 milliGRAM(s) Oral before breakfast  polyethylene glycol 3350 17 Gram(s) Oral daily  sodium chloride 0.9% lock flush 3 milliLiter(s) IV Push every 8 hours  torsemide 10 milliGRAM(s) Oral daily  trihexyphenidyl 2 milliGRAM(s) Oral three times a day    MEDICATIONS  (PRN):  dextrose 40% Gel 15 Gram(s) Oral once PRN Blood Glucose LESS THAN 70 milliGRAM(s)/deciliter  glucagon  Injectable 1 milliGRAM(s) IntraMuscular once PRN Glucose LESS THAN 70 milligrams/deciliter          ***** VITAL SIGNS:  T(F): 98.1 (10-15-19 @ 05:44), Max: 98.1 (10-15-19 @ 05:44)  HR: 60 (10-15-19 @ 06:15) (56 - 62)  BP: 136/72 (10-15-19 @ 05:44) (117/60 - 136/72)  RR: 18 (10-15-19 @ 05:44) (18 - 18)  SpO2: 97% (10-15-19 @ 05:44) (95% - 100%)  Wt(kg): --  ,   I&O's Summary    14 Oct 2019 07:01  -  15 Oct 2019 07:00  --------------------------------------------------------  IN: 610 mL / OUT: 1250 mL / NET: -640 mL    15 Oct 2019 07:01  -  15 Oct 2019 09:03  --------------------------------------------------------  IN: 240 mL / OUT: 0 mL / NET: 240 mL             *****PHYSICAL EXAM:   alert oriented x 3 attention comprehension are fair.  Able to name, repeat. somewhat agitated   EOmi fundi not visualized   no nystagmus VFF to confrontation  Tongue is midline  Palate elevates symmetrically   Moving all 4 ext spontaneously no drift appreciated    B/l tremors left > than right   Gait not assessed.            *****LAB AND IMAGING:                        10.0   12.99 )-----------( 372      ( 15 Oct 2019 06:09 )             30.6               10-15    135  |  96  |  46<H>  ----------------------------<  110<H>  4.0   |  24  |  3.66<H>    Ca    9.3      15 Oct 2019 06:06                           [All pertinent recent Imaging/Reports reviewed]           *****A S S E S S M E N T   A N D   P L A N :    :70 yo M PMHx CAD s/p stent x 1 (2010), T2DM, HTN, Parkinson's presenting with c/o 10/10 chest pain described as burning radiating from abdomen to midsternum x 1 day. Patient reports symptoms started at 2 pm and resolved after 1 hour. Chest pain began after he ate lunch and was walking up a flight of stairs. He reports chest pain was associated with dyspnea, diaphoresis, and nausea. At baseline, he ambulates with cane/walker. Takes medications daily including aspirin and prasugrel. On ROS, reports chronic left leg swelling x 1-2 years.        Problem/Recommendations 1: Dizziness of unclear etiology   s/p cabg,    doing well ambulating oob to chair  continue asa/atorvastatin for secondary prophy    Problem/Recommendations 2: Parkinson's resting tremor slightly worse ? related to amiodarone   continue current regime  comtan with no appreciable benefit             Thank you for allowing me to participate in the care of this patient. Please do not hesitate to call me if you have any  questions.        ________________  Lily Fang MD  [facility] [place] ([facility]  [phone]      33 minutes spent on total encounter; more than 50 % of the visit was  spent counseling about plan of care, compliance to diet/exercise and medication regimen and or  coordinating care by the attending physician.      It is advised that stroke patients follow up with ZACH Albarran @ [phone] in 1- 2 weeks.   Others please follow up with Dr. Michael Nissenbaum [phone]

## 2019-10-14 NOTE — PROGRESS NOTE ADULT - ASSESSMENT
68 yo M PMHx CAD s/p stent x 1 (2010), T2DM, HTN, Parkinson's presenting with c/o 10/10 chest pain described as burning radiating from abdomen to midsternum x 1 day. Patient reports symptoms started at 2 pm and resolved after 1 hour. Chest pain began after he ate lunch and was walking up a flight of stairs. He reports chest pain was associated with dyspnea, diaphoresis, and nausea. At baseline, he ambulates with cane/walker. Takes medications daily including aspirin and prasugrel. On ROS, reports chronic left leg swelling x 1-2 years. Pt s/p cardiac cath at [facility] found to have multivessel disease requiring transfer to [facility] CT surgery service.   9/26 Nephrology consulted, aldactone discontinued. Pre op Transthoracic Echocardiogram Mitral annular calcification, otherwise normal mitral  valve. Calcified trileaflet aortic valve with normal opening. Mild aortic regurgitation. Mild concentric left ventricular hypertrophy. Endocardium not well visualized; grossly normal left ventricular systolic function.  9/27 VVS; Pre op Cardiac Surgery work up in progress. PT evaluation to assess pre op baseline activity level.  Plan for cardiac surgery in next week Wednesday with Dr. Guillory.  On 10/2/19 C5L LIMA to LAD, SVG to RPDA, SVG to RPL, SVG to Ramus, SVG to OM.   Post op Course:   Extubated on POD # 1  Pressor support required à weaned off   (+) dizziness à Neurology following à Unclear of etiology   CKD à Renal following; on Torsemide 10 mg PO daily    Hyperglycemia / Hypothyroid à Endo following. Continue on Synthroid 50 mcg PO daily.   10/7 Transferred to [facility].   10/8 VSS - SR 58 - pt offers no complaints- alex patent, d/c plan to rehab  10/9 HD stable. Pt yet to have BM post-op-given mag citrate this PM.   Javier d/c'd today. Voiding well post-removal.   Discharge planning-*change* re-eval-acute rehab.   10/10: HD stable. CXR today-small left pleural effusion-encouraging improved pulm toilet.  Voiding well since Javier dc'd yesterday.  Stable BUN/Creat  BM x3 overnight after mag citrate yesterday PM.   Discharge planning-acute rehab.   10/11 VSS no events overnight awaiting PMR for dispo recs                                                                                                                               10/12: Increase in creatinine today. D/w with Guillory- will continue to watch for now-renal f/u                                                                                   10/13: downward trend in creatinine today.  pt more puffy today renal f/u re restarting  diuretic.  10/14: VSS. Telemetry: SR 50-60s, on Lopressor 25mg Q8. Renal evaluated pt for inc fluid-start on Torsemide 10mg QD per Dr. Frias's recommendation. Remains awaiting placement at acute versus subacute rehab.

## 2019-10-14 NOTE — PROGRESS NOTE ADULT - NSHPATTENDINGPLANDISCUSS_GEN_ALL_CORE
CTICU
Dr Guillory and call placed to son as well
Wife
cts rounds
Dr. Guillory
cts rounds
Dr Guillory
Dr. Guillory
Dr. Guillory

## 2019-10-14 NOTE — PROGRESS NOTE ADULT - PROBLEM SELECTOR PLAN 2
Neurology on board  Continue with carbidopa/levodopa  25/100 2 Tablet(s) Oral three times a day Neurology following   Continue with carbidopa/levodopa  25/100 2 Tablet(s) Oral three times a day

## 2019-10-14 NOTE — PROGRESS NOTE ADULT - ASSESSMENT
Assessment  DMT2: 69y Male with DM T2 with hyperglycemia, A1C 7.2%, was on oral meds and insulin at home, S/P CABG on insulin,  no new hypoglycemic events, FS trending up, eating full meals, appears lethargic. Planning d/c to acute rehab once medically cleared.  Hypothyroidism: Patient has no hx thyroid dz, was not on any thyroid supplements, now on Synthroid, compliant with intake.  CAD: postop CABG 10/2, on medications, no chest pain, stable, monitored.  HTN: Controlled,  on antihypertensive medications.  Parkinson's: on meds, stable.          Kelsie Reece MD  Cell: [phone]  Office: [phone]

## 2019-10-14 NOTE — PROGRESS NOTE ADULT - ATTENDING COMMENTS
Patient seen and examined.  Agree with above.   Continue with medical management of cad  Supportive care per CTS    Apple Feliz MD

## 2019-10-14 NOTE — PROGRESS NOTE ADULT - SUBJECTIVE AND OBJECTIVE BOX
S: Still has complaints of tiredness and nausea post medications. Denies chest pain or SOB. Review of systems otherwise (-)      MEDICATIONS  (STANDING):  acetaminophen   Tablet .. 325 milliGRAM(s) Oral once  amiodarone    Tablet 200 milliGRAM(s) Oral daily  aspirin enteric coated 325 milliGRAM(s) Oral daily  atorvastatin 80 milliGRAM(s) Oral at bedtime  bisacodyl Suppository 10 milliGRAM(s) Rectal once  carbidopa/levodopa  25/100 2 Tablet(s) Oral three times a day  chlorhexidine 2% Cloths 1 Application(s) Topical <User Schedule>  dextrose 5%. 1000 milliLiter(s) (50 mL/Hr) IV Continuous <Continuous>  dextrose 50% Injectable 12.5 Gram(s) IV Push once  dextrose 50% Injectable 25 Gram(s) IV Push once  dextrose 50% Injectable 25 Gram(s) IV Push once  dextrose 50% Injectable 50 milliLiter(s) IV Push every 15 minutes  docusate sodium 100 milliGRAM(s) Oral three times a day  entacapone 200 milliGRAM(s) Oral daily  epoetin mari Injectable 4000 Unit(s) SubCutaneous every 7 days  ferrous    sulfate 325 milliGRAM(s) Oral daily  folic acid 1 milliGRAM(s) Oral daily  heparin  Injectable 5000 Unit(s) SubCutaneous every 8 hours  insulin glargine Injectable (LANTUS) 22 Unit(s) SubCutaneous at bedtime  insulin lispro (HumaLOG) corrective regimen sliding scale   SubCutaneous three times a day before meals  insulin lispro (HumaLOG) corrective regimen sliding scale   SubCutaneous at bedtime  insulin lispro Injectable (HumaLOG) 8 Unit(s) SubCutaneous three times a day before meals  levothyroxine 50 MICROGram(s) Oral daily  metoprolol tartrate 25 milliGRAM(s) Oral every 8 hours  pantoprazole    Tablet 40 milliGRAM(s) Oral before breakfast  polyethylene glycol 3350 17 Gram(s) Oral daily  sodium chloride 0.9% lock flush 3 milliLiter(s) IV Push every 8 hours  torsemide 10 milliGRAM(s) Oral daily  trihexyphenidyl 2 milliGRAM(s) Oral three times a day    MEDICATIONS  (PRN):  dextrose 40% Gel 15 Gram(s) Oral once PRN Blood Glucose LESS THAN 70 milliGRAM(s)/deciliter  glucagon  Injectable 1 milliGRAM(s) IntraMuscular once PRN Glucose LESS THAN 70 milligrams/deciliter      LABS:                            9.4    11.85 )-----------( 396      ( 14 Oct 2019 06:45 )             28.8     Hemoglobin: 9.4 g/dL (10-14 @ 06:45)  Hemoglobin: 9.7 g/dL (10-13 @ 06:53)  Hemoglobin: 9.6 g/dL (10-12 @ 06:02)  Hemoglobin: 10.0 g/dL (10-11 @ 06:34)  Hemoglobin: 10.0 g/dL (10-10 @ 06:32)    10-14    135  |  95<L>  |  48<H>  ----------------------------<  130<H>  4.0   |  27  |  3.66<H>    Ca    9.2      14 Oct 2019 06:45      Creatinine Trend: 3.66<--, 3.67<--, 3.79<--, 3.56<--, 3.27<--, 3.32<--       10-13-19 @ 07:01  -  10-14-19 @ 07:00  --------------------------------------------------------  IN: 840 mL / OUT: 2000 mL / NET: -1160 mL    10-14-19 @ 07:01  -  10-14-19 @ 15:14  --------------------------------------------------------  IN: 360 mL / OUT: 300 mL / NET: 60 mL        PHYSICAL EXAM  Vital Signs Last 24 Hrs  T(C): 36.2 (14 Oct 2019 13:29), Max: 36.7 (13 Oct 2019 20:25)  T(F): 97.2 (14 Oct 2019 13:29), Max: 98.1 (13 Oct 2019 20:25)  HR: 62 (14 Oct 2019 13:29) (57 - 62)  BP: 117/60 (14 Oct 2019 13:29) (104/60 - 133/73)  BP(mean): --  RR: 18 (14 Oct 2019 13:29) (18 - 18)  SpO2: 95% (14 Oct 2019 13:29) (95% - 97%)      Gen: Appears well in NAD  HEENT:  (-)icterus (-)pallor  CV: N S1 S2 1/6 JENNIFER (+)2 Pulses B/l  Resp:  Clear to auscultation B/L, normal effort  GI: (+) BS Soft, NT, ND  Lymph:  (+) B/L LE Edema, (-)obvious lymphadenopathy  Skin: Warm to touch, Normal turgor  Psych: Appropriate mood and affect      TELEMETRY: SB/SR 50-60s        DIAGNOSTIC TESTING:  [ ] Echocardiogram: < from: Intra-Operative Transesophageal Echo (10.02.19 @ 13:30) >  Conclusions:  1. Mild segmental left ventricular systolic dysfunction.  Apicl hypokinesis  2. Moderate diastolic dysfunction (Stage II).  Confirmed on  10/2/2019 - 14:16:07 by Daniel Duran M.D.  ------------------------------------------------------------------------    < end of copied text >      ASSESSMENT/PLAN:   68 yo M PMHx CAD s/p stent x 1 (2010), T2DM, HTN, Parkinson's, history of meningioma, admitted with NSTEMI, TTE with mild segmental LV dysfxn, cath with multivessel CAD, s/p C5L LIMA-LAD, SVG-RPDA, SVG-RPL, SVG-Ramus, SVG-OM 10/2    - Remains in sinus on tele  - Cont ASA, statin   - Tolerating Amio/BB  - GI / DVT prophylaxis, - keep K>4, mg >2.0   - Renal f/u - trend creatinine - diuretics resumed  - Supportive care per CTS appreciated  - Acute rehab planning per primary team  - Patient to f/u with his cardiologist Dr. Wagner Simons after discharge    Yunier Quiroz PA-C  [facility]  Pager: [phone]

## 2019-10-14 NOTE — PROGRESS NOTE ADULT - ASSESSMENT
70 yo M PMHx CAD s/p stent x 1 (2010), T2DM, HTN, Parkinson's and CKD stage 3b presents with chest pain. S/P cath with MVD-   CABG  Subnephrotic range proteinuria     1. Renal - [id number].  Trend BMP and urine output  Azotemia slowly improving     2. CVS   BP is stable     3. SP Hyponatremia- hypervolemic;      4. Anemia s/p Epogen; Hgb slowly improving (on Epogen)    Recommendations  - Trend renal profile.  - Fluid restriction 1.2 L per day   - diuretics have been on hold dt azotemia; patient breathing evenly & non-labored; would favor restarting diuretics (torsemide 10mg po qd)  -Physical therapy   (DC Epogen on discharge)

## 2019-10-14 NOTE — PROGRESS NOTE ADULT - SUBJECTIVE AND OBJECTIVE BOX
Chief complaint  Patient is a 69y old  Male who presents with a chief complaint of Multivessel disease (14 Oct 2019 12:02)   Review of systems  Patient in bed, looks comfortable, no fever, no hypoglycemia.    Labs and Fingersticks  CAPILLARY BLOOD GLUCOSE      POCT Blood Glucose.: 168 mg/dL (14 Oct 2019 12:02)  POCT Blood Glucose.: 137 mg/dL (14 Oct 2019 07:33)  POCT Blood Glucose.: 80 mg/dL (13 Oct 2019 21:32)  POCT Blood Glucose.: 168 mg/dL (13 Oct 2019 17:15)      Anion Gap, Serum: 13 (10-14 @ 06:45)  Anion Gap, Serum: 13 (10-13 @ 06:53)      Calcium, Total Serum: 9.2 (10-14 @ 06:45)  Calcium, Total Serum: 9.3 (10-13 @ 06:53)          10-14    135  |  95<L>  |  48<H>  ----------------------------<  130<H>  4.0   |  27  |  3.66<H>    Ca    9.2      14 Oct 2019 06:45                          9.4    11.85 )-----------( 396      ( 14 Oct 2019 06:45 )             28.8     Medications  MEDICATIONS  (STANDING):  acetaminophen   Tablet .. 325 milliGRAM(s) Oral once  amiodarone    Tablet 200 milliGRAM(s) Oral daily  aspirin enteric coated 325 milliGRAM(s) Oral daily  atorvastatin 80 milliGRAM(s) Oral at bedtime  bisacodyl Suppository 10 milliGRAM(s) Rectal once  carbidopa/levodopa  25/100 2 Tablet(s) Oral three times a day  chlorhexidine 2% Cloths 1 Application(s) Topical <User Schedule>  dextrose 5%. 1000 milliLiter(s) (50 mL/Hr) IV Continuous <Continuous>  dextrose 50% Injectable 12.5 Gram(s) IV Push once  dextrose 50% Injectable 25 Gram(s) IV Push once  dextrose 50% Injectable 25 Gram(s) IV Push once  dextrose 50% Injectable 50 milliLiter(s) IV Push every 15 minutes  docusate sodium 100 milliGRAM(s) Oral three times a day  entacapone 200 milliGRAM(s) Oral daily  epoetin mari Injectable 4000 Unit(s) SubCutaneous every 7 days  ferrous    sulfate 325 milliGRAM(s) Oral daily  folic acid 1 milliGRAM(s) Oral daily  heparin  Injectable 5000 Unit(s) SubCutaneous every 8 hours  insulin glargine Injectable (LANTUS) 22 Unit(s) SubCutaneous at bedtime  insulin lispro (HumaLOG) corrective regimen sliding scale   SubCutaneous three times a day before meals  insulin lispro (HumaLOG) corrective regimen sliding scale   SubCutaneous at bedtime  insulin lispro Injectable (HumaLOG) 8 Unit(s) SubCutaneous three times a day before meals  levothyroxine 50 MICROGram(s) Oral daily  metoprolol tartrate 25 milliGRAM(s) Oral every 8 hours  pantoprazole    Tablet 40 milliGRAM(s) Oral before breakfast  polyethylene glycol 3350 17 Gram(s) Oral daily  sodium chloride 0.9% lock flush 3 milliLiter(s) IV Push every 8 hours  torsemide 10 milliGRAM(s) Oral daily  trihexyphenidyl 2 milliGRAM(s) Oral three times a day      Physical Exam  General: Patient comfortable in bed  Vital Signs Last 12 Hrs  T(F): 97.2 (10-14-19 @ 13:29), Max: 97.9 (10-14-19 @ 05:00)  HR: 62 (10-14-19 @ 13:29) (57 - 62)  BP: 117/60 (10-14-19 @ 13:29) (117/60 - 133/73)  BP(mean): --  RR: 18 (10-14-19 @ 13:29) (18 - 18)  SpO2: 95% (10-14-19 @ 13:29) (95% - 97%)  Neck: No palpable thyroid nodules.  CVS: S1S2, No murmurs  Respiratory: No wheezing, no crepitations  GI: Abdomen soft, bowel sounds positive  Musculoskeletal:  edema lower extremities.   Skin: No skin rashes, no ecchymosis    Diagnostics

## 2019-10-14 NOTE — PROGRESS NOTE ADULT - SUBJECTIVE AND OBJECTIVE BOX
Subjective: Pt states "I'm OK." Denies any CP, SOB, N/V and palpitations. No acute events overnight.     VITAL SIGNS  Telemetry: sinus caitlin 56 bpm   Vital Signs Last 24 Hrs  T(C): 36.6 (10-14-19 @ 05:00), Max: 36.7 (10-13-19 @ 20:25)  T(F): 97.9 (10-14-19 @ 05:00), Max: 98.1 (10-13-19 @ 20:25)  HR: 57 (10-14-19 @ 05:00) (57 - 66)  BP: 133/73 (10-14-19 @ 05:00) (104/60 - 133/73)  RR: 18 (10-14-19 @ 05:00) (18 - 18)  SpO2: 97% (10-14-19 @ 05:00) (96% - 97%)         10-13 @ 07:01  -  10-14 @ 07:00  --------------------------------------------------------  IN: 840 mL / OUT: 2000 mL / NET: -1160 mL  10-14 @ 07:01  -  10-14 @ 12:02  --------------------------------------------------------  IN: 360 mL / OUT: 300 mL / NET: 60 mL    Daily Weight in k.3 (14 Oct 2019 08:22)    CAPILLARY BLOOD GLUCOSE  POCT Blood Glucose.: 137 mg/dL (14 Oct 2019 07:33)  POCT Blood Glucose.: 80 mg/dL (13 Oct 2019 21:32)  POCT Blood Glucose.: 168 mg/dL (13 Oct 2019 17:15)  POCT Blood Glucose.: 137 mg/dL (13 Oct 2019 12:09)        MEDICATIONS  acetaminophen   Tablet .. 325 milliGRAM(s) Oral once  amiodarone    Tablet 200 milliGRAM(s) Oral daily  aspirin enteric coated 325 milliGRAM(s) Oral daily  atorvastatin 80 milliGRAM(s) Oral at bedtime  bisacodyl Suppository 10 milliGRAM(s) Rectal once  carbidopa/levodopa  25/100 2 Tablet(s) Oral three times a day  chlorhexidine 2% Cloths 1 Application(s) Topical <User Schedule>  dextrose 40% Gel 15 Gram(s) Oral once PRN  dextrose 5%. 1000 milliLiter(s) IV Continuous <Continuous>  dextrose 50% Injectable 12.5 Gram(s) IV Push once  dextrose 50% Injectable 25 Gram(s) IV Push once  dextrose 50% Injectable 25 Gram(s) IV Push once  dextrose 50% Injectable 50 milliLiter(s) IV Push every 15 minutes  docusate sodium 100 milliGRAM(s) Oral three times a day  entacapone 200 milliGRAM(s) Oral daily  epoetin mari Injectable 4000 Unit(s) SubCutaneous every 7 days  ferrous    sulfate 325 milliGRAM(s) Oral daily  folic acid 1 milliGRAM(s) Oral daily  glucagon  Injectable 1 milliGRAM(s) IntraMuscular once PRN  heparin  Injectable 5000 Unit(s) SubCutaneous every 8 hours  insulin glargine Injectable (LANTUS) 22 Unit(s) SubCutaneous at bedtime  insulin lispro (HumaLOG) corrective regimen sliding scale   SubCutaneous three times a day before meals  insulin lispro (HumaLOG) corrective regimen sliding scale   SubCutaneous at bedtime  insulin lispro Injectable (HumaLOG) 8 Unit(s) SubCutaneous three times a day before meals  levothyroxine 50 MICROGram(s) Oral daily  metoprolol tartrate 25 milliGRAM(s) Oral every 8 hours  pantoprazole    Tablet 40 milliGRAM(s) Oral before breakfast  polyethylene glycol 3350 17 Gram(s) Oral daily  sodium chloride 0.9% lock flush 3 milliLiter(s) IV Push every 8 hours  trihexyphenidyl 2 milliGRAM(s) Oral three times a day    PHYSICAL EXAM  Neurology: A&Ox3, nonfocal, Parkinsonian tremors   CV : Bradycardic, regular rhythm, +S1S2  Sternal Wound :  FREDIS, CDI, Stable sternum  Lungs: Respirations non-labored, mildly diminished in LLL, CTA on R  Abdomen: Soft, NT/ND, +BSx4Q, last BM on 10/15, (-)N/V/D  : Voiding without difficulty, bladder non-distended  Extremities: trace B/L LE edema, negative calf tenderness, +PP SVG incision-CDI, well healing     LABS  10-    135  |  95<L>  |  48<H>  ----------------------------<  130<H>  4.0   |  27  |  3.66<H>    Ca    9.2      14 Oct 2019 06:45                      9.4    11.85 )-----------( 396      ( 14 Oct 2019 06:45 )             28.8          PAST MEDICAL & SURGICAL HISTORY:  Shoulder pain, left  Osteoarthritis  Panic attacks  Urinary frequency  Urinary incontinence  Nocturia: x2-3  Gastroesophageal reflux disease without esophagitis: diet controlled  Vertigo: not improved with meclizine in past  Insomnia  Autoimmune disease: started on prednisone on 12/23/15 for &quot;inflammation&quot; but does not know diagnosis  Leg swelling: left leg  CAD (coronary artery disease): s/p 1 stent in   Parkinson disease  Hypercholesterolemia  Diabetes Mellitus, Type 2  Hypertension  S/P angioplasty with stent: 1 stent, RCA  Status Post Cardiac Catheterization     Physical Therapy Rec:   Home  [  ]   Home w/ PT  [  ]  Rehab  [ X ]    Discussed with CT Surgery attending. Subjective: Pt states "I'm OK." Denies any CP, SOB, N/V and palpitations. No acute events overnight.     VITAL SIGNS  Telemetry: sinus caitlin 56 bpm   Vital Signs Last 24 Hrs  T(C): 36.6 (10-14-19 @ 05:00), Max: 36.7 (10-13-19 @ 20:25)  T(F): 97.9 (10-14-19 @ 05:00), Max: 98.1 (10-13-19 @ 20:25)  HR: 57 (10-14-19 @ 05:00) (57 - 66)  BP: 133/73 (10-14-19 @ 05:00) (104/60 - 133/73)  RR: 18 (10-14-19 @ 05:00) (18 - 18)  SpO2: 97% (10-14-19 @ 05:00) (96% - 97%)         10-13 @ 07:01  -  10-14 @ 07:00  --------------------------------------------------------  IN: 840 mL / OUT: 2000 mL / NET: -1160 mL  10-14 @ 07:01  -  10-14 @ 12:02  --------------------------------------------------------  IN: 360 mL / OUT: 300 mL / NET: 60 mL    Daily Weight in k.3 (14 Oct 2019 08:22)    CAPILLARY BLOOD GLUCOSE  POCT Blood Glucose.: 137 mg/dL (14 Oct 2019 07:33)  POCT Blood Glucose.: 80 mg/dL (13 Oct 2019 21:32)  POCT Blood Glucose.: 168 mg/dL (13 Oct 2019 17:15)  POCT Blood Glucose.: 137 mg/dL (13 Oct 2019 12:09)        MEDICATIONS  acetaminophen   Tablet .. 325 milliGRAM(s) Oral once  amiodarone    Tablet 200 milliGRAM(s) Oral daily  aspirin enteric coated 325 milliGRAM(s) Oral daily  atorvastatin 80 milliGRAM(s) Oral at bedtime  bisacodyl Suppository 10 milliGRAM(s) Rectal once  carbidopa/levodopa  25/100 2 Tablet(s) Oral three times a day  chlorhexidine 2% Cloths 1 Application(s) Topical <User Schedule>  dextrose 40% Gel 15 Gram(s) Oral once PRN  dextrose 5%. 1000 milliLiter(s) IV Continuous <Continuous>  dextrose 50% Injectable 12.5 Gram(s) IV Push once  dextrose 50% Injectable 25 Gram(s) IV Push once  dextrose 50% Injectable 25 Gram(s) IV Push once  dextrose 50% Injectable 50 milliLiter(s) IV Push every 15 minutes  docusate sodium 100 milliGRAM(s) Oral three times a day  entacapone 200 milliGRAM(s) Oral daily  epoetin mari Injectable 4000 Unit(s) SubCutaneous every 7 days  ferrous    sulfate 325 milliGRAM(s) Oral daily  folic acid 1 milliGRAM(s) Oral daily  glucagon  Injectable 1 milliGRAM(s) IntraMuscular once PRN  heparin  Injectable 5000 Unit(s) SubCutaneous every 8 hours  insulin glargine Injectable (LANTUS) 22 Unit(s) SubCutaneous at bedtime  insulin lispro (HumaLOG) corrective regimen sliding scale   SubCutaneous three times a day before meals  insulin lispro (HumaLOG) corrective regimen sliding scale   SubCutaneous at bedtime  insulin lispro Injectable (HumaLOG) 8 Unit(s) SubCutaneous three times a day before meals  levothyroxine 50 MICROGram(s) Oral daily  metoprolol tartrate 25 milliGRAM(s) Oral every 8 hours  pantoprazole    Tablet 40 milliGRAM(s) Oral before breakfast  polyethylene glycol 3350 17 Gram(s) Oral daily  sodium chloride 0.9% lock flush 3 milliLiter(s) IV Push every 8 hours  trihexyphenidyl 2 milliGRAM(s) Oral three times a day    PHYSICAL EXAM  Neurology: A&Ox3, nonfocal, Parkinsonian tremors   CV : Bradycardic, regular rhythm, +S1S2  Sternal Wound :  FREDIS, CDI, Stable sternum  Lungs: Respirations non-labored, mildly diminished in LLL, CTA on R  Abdomen: Soft, NT/ND, +BSx4Q, last BM on 10/14, (-)N/V/D  : Voiding without difficulty, bladder non-distended  Extremities: trace B/L LE edema, negative calf tenderness, +PP SVG incision-CDI, well healing     LABS  10-    135  |  95<L>  |  48<H>  ----------------------------<  130<H>  4.0   |  27  |  3.66<H>    Ca    9.2      14 Oct 2019 06:45                      9.4    11.85 )-----------( 396      ( 14 Oct 2019 06:45 )             28.8          PAST MEDICAL & SURGICAL HISTORY:  Shoulder pain, left  Osteoarthritis  Panic attacks  Urinary frequency  Urinary incontinence  Nocturia: x2-3  Gastroesophageal reflux disease without esophagitis: diet controlled  Vertigo: not improved with meclizine in past  Insomnia  Autoimmune disease: started on prednisone on 12/23/15 for &quot;inflammation&quot; but does not know diagnosis  Leg swelling: left leg  CAD (coronary artery disease): s/p 1 stent in   Parkinson disease  Hypercholesterolemia  Diabetes Mellitus, Type 2  Hypertension  S/P angioplasty with stent: 1 stent, RCA  Status Post Cardiac Catheterization     Physical Therapy Rec:   Home  [  ]   Home w/ PT  [  ]  Rehab  [ X ]    Discussed with CT Surgery attending.

## 2019-10-14 NOTE — PROGRESS NOTE ADULT - PROBLEM SELECTOR PLAN 2
Will continue Synthroid 50 mcg for now, will continue monitoring and FU.  Suggest to FU endocrinology and repeat TFTs in 6 weeks.

## 2019-10-15 ENCOUNTER — TRANSCRIPTION ENCOUNTER (OUTPATIENT)
Age: 69
End: 2019-10-15

## 2019-10-15 VITALS
OXYGEN SATURATION: 96 % | SYSTOLIC BLOOD PRESSURE: 152 MMHG | HEART RATE: 82 BPM | TEMPERATURE: 98 F | RESPIRATION RATE: 18 BRPM | DIASTOLIC BLOOD PRESSURE: 71 MMHG

## 2019-10-15 LAB
ANION GAP SERPL CALC-SCNC: 15 MMOL/L — SIGNIFICANT CHANGE UP (ref 5–17)
BUN SERPL-MCNC: 46 MG/DL — HIGH (ref 7–23)
CALCIUM SERPL-MCNC: 9.3 MG/DL — SIGNIFICANT CHANGE UP (ref 8.4–10.5)
CHLORIDE SERPL-SCNC: 96 MMOL/L — SIGNIFICANT CHANGE UP (ref 96–108)
CO2 SERPL-SCNC: 24 MMOL/L — SIGNIFICANT CHANGE UP (ref 22–31)
CREAT SERPL-MCNC: 3.66 MG/DL — HIGH (ref 0.5–1.3)
GLUCOSE SERPL-MCNC: 110 MG/DL — HIGH (ref 70–99)
HCT VFR BLD CALC: 30.6 % — LOW (ref 39–50)
HGB BLD-MCNC: 10 G/DL — LOW (ref 13–17)
MCHC RBC-ENTMCNC: 30 PG — SIGNIFICANT CHANGE UP (ref 27–34)
MCHC RBC-ENTMCNC: 32.7 GM/DL — SIGNIFICANT CHANGE UP (ref 32–36)
MCV RBC AUTO: 91.9 FL — SIGNIFICANT CHANGE UP (ref 80–100)
NRBC # BLD: 0 /100 WBCS — SIGNIFICANT CHANGE UP (ref 0–0)
PLATELET # BLD AUTO: 372 K/UL — SIGNIFICANT CHANGE UP (ref 150–400)
POTASSIUM SERPL-MCNC: 4 MMOL/L — SIGNIFICANT CHANGE UP (ref 3.5–5.3)
POTASSIUM SERPL-SCNC: 4 MMOL/L — SIGNIFICANT CHANGE UP (ref 3.5–5.3)
RBC # BLD: 3.33 M/UL — LOW (ref 4.2–5.8)
RBC # FLD: 14.4 % — SIGNIFICANT CHANGE UP (ref 10.3–14.5)
SODIUM SERPL-SCNC: 135 MMOL/L — SIGNIFICANT CHANGE UP (ref 135–145)
T4 FREE SERPL-MCNC: 1.6 NG/DL — SIGNIFICANT CHANGE UP (ref 0.9–1.8)
WBC # BLD: 12.99 K/UL — HIGH (ref 3.8–10.5)
WBC # FLD AUTO: 12.99 K/UL — HIGH (ref 3.8–10.5)

## 2019-10-15 PROCEDURE — 93010 ELECTROCARDIOGRAM REPORT: CPT

## 2019-10-15 RX ORDER — AMIODARONE HYDROCHLORIDE 400 MG/1
1 TABLET ORAL
Qty: 30 | Refills: 0
Start: 2019-10-15 | End: 2019-11-13

## 2019-10-15 RX ORDER — AMIODARONE HYDROCHLORIDE 400 MG/1
1 TABLET ORAL
Qty: 0 | Refills: 0 | DISCHARGE
Start: 2019-10-15

## 2019-10-15 RX ORDER — CARBIDOPA AND LEVODOPA 25; 100 MG/1; MG/1
2 TABLET ORAL
Qty: 0 | Refills: 0 | DISCHARGE
Start: 2019-10-15

## 2019-10-15 RX ORDER — INSULIN GLARGINE 100 [IU]/ML
22 INJECTION, SOLUTION SUBCUTANEOUS
Qty: 0 | Refills: 0 | DISCHARGE
Start: 2019-10-15

## 2019-10-15 RX ORDER — INSULIN LISPRO 100/ML
1 VIAL (ML) SUBCUTANEOUS
Qty: 0 | Refills: 0 | DISCHARGE
Start: 2019-10-15

## 2019-10-15 RX ORDER — FOLIC ACID 0.8 MG
1 TABLET ORAL
Qty: 0 | Refills: 0 | DISCHARGE
Start: 2019-10-15

## 2019-10-15 RX ORDER — ACETAMINOPHEN 500 MG
1 TABLET ORAL
Qty: 0 | Refills: 0 | DISCHARGE
Start: 2019-10-15

## 2019-10-15 RX ORDER — POLYETHYLENE GLYCOL 3350 17 G/17G
17 POWDER, FOR SOLUTION ORAL
Qty: 0 | Refills: 0 | DISCHARGE
Start: 2019-10-15

## 2019-10-15 RX ORDER — LEVOTHYROXINE SODIUM 125 MCG
1 TABLET ORAL
Qty: 0 | Refills: 0 | DISCHARGE
Start: 2019-10-15

## 2019-10-15 RX ORDER — ATORVASTATIN CALCIUM 80 MG/1
1 TABLET, FILM COATED ORAL
Qty: 0 | Refills: 0 | DISCHARGE
Start: 2019-10-15

## 2019-10-15 RX ORDER — ASPIRIN/CALCIUM CARB/MAGNESIUM 324 MG
1 TABLET ORAL
Qty: 0 | Refills: 0 | DISCHARGE
Start: 2019-10-15

## 2019-10-15 RX ORDER — PANTOPRAZOLE SODIUM 20 MG/1
1 TABLET, DELAYED RELEASE ORAL
Qty: 0 | Refills: 0 | DISCHARGE
Start: 2019-10-15

## 2019-10-15 RX ORDER — DOCUSATE SODIUM 100 MG
1 CAPSULE ORAL
Qty: 0 | Refills: 0 | DISCHARGE
Start: 2019-10-15

## 2019-10-15 RX ORDER — METOPROLOL TARTRATE 50 MG
1 TABLET ORAL
Qty: 0 | Refills: 0 | DISCHARGE
Start: 2019-10-15

## 2019-10-15 RX ORDER — TRIHEXYPHENIDYL HCL 2 MG
1 TABLET ORAL
Qty: 0 | Refills: 0 | DISCHARGE
Start: 2019-10-15

## 2019-10-15 RX ADMIN — Medication 325 MILLIGRAM(S): at 12:35

## 2019-10-15 RX ADMIN — PANTOPRAZOLE SODIUM 40 MILLIGRAM(S): 20 TABLET, DELAYED RELEASE ORAL at 05:31

## 2019-10-15 RX ADMIN — AMIODARONE HYDROCHLORIDE 200 MILLIGRAM(S): 400 TABLET ORAL at 05:31

## 2019-10-15 RX ADMIN — CARBIDOPA AND LEVODOPA 2 TABLET(S): 25; 100 TABLET ORAL at 05:31

## 2019-10-15 RX ADMIN — HEPARIN SODIUM 5000 UNIT(S): 5000 INJECTION INTRAVENOUS; SUBCUTANEOUS at 05:31

## 2019-10-15 RX ADMIN — HEPARIN SODIUM 5000 UNIT(S): 5000 INJECTION INTRAVENOUS; SUBCUTANEOUS at 14:57

## 2019-10-15 RX ADMIN — Medication 25 MILLIGRAM(S): at 06:16

## 2019-10-15 RX ADMIN — Medication 50 MICROGRAM(S): at 05:31

## 2019-10-15 RX ADMIN — Medication 100 MILLIGRAM(S): at 05:32

## 2019-10-15 RX ADMIN — CHLORHEXIDINE GLUCONATE 1 APPLICATION(S): 213 SOLUTION TOPICAL at 07:48

## 2019-10-15 RX ADMIN — CARBIDOPA AND LEVODOPA 2 TABLET(S): 25; 100 TABLET ORAL at 14:57

## 2019-10-15 RX ADMIN — Medication 100 MILLIGRAM(S): at 14:57

## 2019-10-15 RX ADMIN — Medication 2 MILLIGRAM(S): at 05:31

## 2019-10-15 RX ADMIN — Medication 2 MILLIGRAM(S): at 14:57

## 2019-10-15 RX ADMIN — SODIUM CHLORIDE 3 MILLILITER(S): 9 INJECTION INTRAMUSCULAR; INTRAVENOUS; SUBCUTANEOUS at 05:32

## 2019-10-15 RX ADMIN — Medication 8 UNIT(S): at 12:34

## 2019-10-15 RX ADMIN — Medication 10 MILLIGRAM(S): at 05:32

## 2019-10-15 RX ADMIN — Medication 8 UNIT(S): at 10:03

## 2019-10-15 RX ADMIN — Medication 25 MILLIGRAM(S): at 14:57

## 2019-10-15 RX ADMIN — Medication 1 MILLIGRAM(S): at 12:35

## 2019-10-15 RX ADMIN — Medication 6: at 12:34

## 2019-10-15 NOTE — DISCHARGE NOTE PROVIDER - CARE PROVIDER_API CALL
Ty Guillory (MD)  Surgery; Surgical Critical Care; Thoracic and Cardiac Surgery  [address] [place] [address]  Phone: [phone]  Fax: [phone]  Follow Up Time:

## 2019-10-15 NOTE — PROGRESS NOTE ADULT - PROBLEM SELECTOR PLAN 1
Will continue monitoring FS, log, and FU.  DC to rehab on current insulin FU 4 w  Patient counseled for compliance with consistent low carb diet.

## 2019-10-15 NOTE — DISCHARGE NOTE PROVIDER - NSDCPNSUBOBJ_GEN_ALL_CORE
PHYSICAL EXAM    Neurology: A&Ox3, nonfocal, Parkinsonian tremors     CV : Bradycardic, regular rhythm, +S1S2    Sternal Wound :  FREDIS, CDI, Stable sternum    Lungs: Respirations non-labored, mildly diminished in LLL, CTA on R    Abdomen: Soft, NT/ND, +BSx4Q, last BM on 10/14, (-)N/V/D    : Voiding without difficulty, bladder non-distended    Extremities: trace B/L LE edema, negative calf tenderness, +PP SVG incision-CDI, well healing

## 2019-10-15 NOTE — PROGRESS NOTE ADULT - ASSESSMENT
70 yo M PMHx CAD s/p stent x 1 (2010), T2DM, HTN, Parkinson's and CKD stage 3b presents with chest pain. S/P cath with MVD-   CABG  Subnephrotic range proteinuria     1. Renal - RLD1x-7.  Trend BMP and urine output    2. CVS   BP is stable     3. SP Hyponatremia- hypervolemic;      4. Anemia s/p Epogen; Hgb slowly improving (on Epogen)    Recommendations  - Trend renal profile.  Creatinine noted today   - Fluid restriction 1.2 L per day   - diuretics restarted   -Physical therapy   (DC Epogen on discharge)

## 2019-10-15 NOTE — DISCHARGE NOTE PROVIDER - NSDCFUADDAPPT_GEN_ALL_CORE_FT
follow up appt with DR. Ty Guillory the week you arrive home from rehab  [phone]  follow up appt with your Cardiologist and /or Primary Care MD with 2-3 weeks

## 2019-10-15 NOTE — PROGRESS NOTE ADULT - SUBJECTIVE AND OBJECTIVE BOX
[facility] [phone]        Patient seen and examined in the chair.  He was in good spirits         MEDICATIONS  (STANDING):  acetaminophen   Tablet .. 325 milliGRAM(s) Oral once  amiodarone    Tablet 200 milliGRAM(s) Oral daily  aspirin enteric coated 325 milliGRAM(s) Oral daily  atorvastatin 80 milliGRAM(s) Oral at bedtime  bisacodyl Suppository 10 milliGRAM(s) Rectal once  carbidopa/levodopa  25/100 2 Tablet(s) Oral three times a day  chlorhexidine 2% Cloths 1 Application(s) Topical <User Schedule>  dextrose 5%. 1000 milliLiter(s) (50 mL/Hr) IV Continuous <Continuous>  dextrose 50% Injectable 12.5 Gram(s) IV Push once  dextrose 50% Injectable 25 Gram(s) IV Push once  dextrose 50% Injectable 25 Gram(s) IV Push once  dextrose 50% Injectable 50 milliLiter(s) IV Push every 15 minutes  docusate sodium 100 milliGRAM(s) Oral three times a day  epoetin mari Injectable 4000 Unit(s) SubCutaneous every 7 days  ferrous    sulfate 325 milliGRAM(s) Oral daily  folic acid 1 milliGRAM(s) Oral daily  heparin  Injectable 5000 Unit(s) SubCutaneous every 8 hours  insulin glargine Injectable (LANTUS) 22 Unit(s) SubCutaneous at bedtime  insulin lispro (HumaLOG) corrective regimen sliding scale   SubCutaneous three times a day before meals  insulin lispro (HumaLOG) corrective regimen sliding scale   SubCutaneous at bedtime  insulin lispro Injectable (HumaLOG) 8 Unit(s) SubCutaneous three times a day before meals  levothyroxine 50 MICROGram(s) Oral daily  metoprolol tartrate 25 milliGRAM(s) Oral every 8 hours  pantoprazole    Tablet 40 milliGRAM(s) Oral before breakfast  polyethylene glycol 3350 17 Gram(s) Oral daily  sodium chloride 0.9% lock flush 3 milliLiter(s) IV Push every 8 hours  torsemide 10 milliGRAM(s) Oral daily  trihexyphenidyl 2 milliGRAM(s) Oral three times a day      VITAL:  T(C): , Max: 36.7 (10-15-19 @ 05:44)  T(F): , Max: 98.1 (10-15-19 @ 05:44)  HR: 60 (10-15-19 @ 06:15)  BP: 136/72 (10-15-19 @ 05:44)  BP(mean): --  RR: 18 (10-15-19 @ 05:44)  SpO2: 97% (10-15-19 @ 05:44)  Wt(kg): --    I and O's:    10-14 @ 07:01  -  10-15 @ 07:00  --------------------------------------------------------  IN: 610 mL / OUT: 1250 mL / NET: -640 mL    10-15 @ 07:01  -  10-15 @ 08:57  --------------------------------------------------------  IN: 240 mL / OUT: 0 mL / NET: 240 mL          PHYSICAL EXAM:    Constitutional: NAD  Neck:  No JVD  Respiratory: CTAB/L  Cardiovascular: S1 and S2  Gastrointestinal: BS+, soft, NT/ND  Extremities:trace  peripheral edema  Neurological: A/O x 3, no focal deficits  Psychiatric: Normal mood, normal affect  : No Javier  Skin: No rashes  Access: Not applicable    LABS:                        10.0   12.99 )-----------( 372      ( 15 Oct 2019 06:09 )             30.6     10-15    135  |  96  |  46<H>  ----------------------------<  110<H>  4.0   |  24  |  3.66<H>    Ca    9.3      15 Oct 2019 06:06            Urine Studies:          RADIOLOGY & ADDITIONAL STUDIES:

## 2019-10-15 NOTE — PROGRESS NOTE ADULT - PROBLEM SELECTOR PROBLEM 3
Diabetes Mellitus, Type 2
CAD (coronary artery disease)
Diabetes Mellitus, Type 2

## 2019-10-15 NOTE — PROGRESS NOTE ADULT - SUBJECTIVE AND OBJECTIVE BOX
S: Denies chest pain or SOB. Review of systems otherwise (-)      MEDICATIONS  (STANDING):  acetaminophen   Tablet .. 325 milliGRAM(s) Oral once  amiodarone    Tablet 200 milliGRAM(s) Oral daily  aspirin enteric coated 325 milliGRAM(s) Oral daily  atorvastatin 80 milliGRAM(s) Oral at bedtime  bisacodyl Suppository 10 milliGRAM(s) Rectal once  carbidopa/levodopa  25/100 2 Tablet(s) Oral three times a day  chlorhexidine 2% Cloths 1 Application(s) Topical <User Schedule>  dextrose 5%. 1000 milliLiter(s) (50 mL/Hr) IV Continuous <Continuous>  dextrose 50% Injectable 12.5 Gram(s) IV Push once  dextrose 50% Injectable 25 Gram(s) IV Push once  dextrose 50% Injectable 25 Gram(s) IV Push once  dextrose 50% Injectable 50 milliLiter(s) IV Push every 15 minutes  docusate sodium 100 milliGRAM(s) Oral three times a day  epoetin mari Injectable 4000 Unit(s) SubCutaneous every 7 days  ferrous    sulfate 325 milliGRAM(s) Oral daily  folic acid 1 milliGRAM(s) Oral daily  heparin  Injectable 5000 Unit(s) SubCutaneous every 8 hours  insulin glargine Injectable (LANTUS) 22 Unit(s) SubCutaneous at bedtime  insulin lispro (HumaLOG) corrective regimen sliding scale   SubCutaneous three times a day before meals  insulin lispro (HumaLOG) corrective regimen sliding scale   SubCutaneous at bedtime  insulin lispro Injectable (HumaLOG) 8 Unit(s) SubCutaneous three times a day before meals  levothyroxine 50 MICROGram(s) Oral daily  metoprolol tartrate 25 milliGRAM(s) Oral every 8 hours  pantoprazole    Tablet 40 milliGRAM(s) Oral before breakfast  polyethylene glycol 3350 17 Gram(s) Oral daily  sodium chloride 0.9% lock flush 3 milliLiter(s) IV Push every 8 hours  torsemide 10 milliGRAM(s) Oral daily  trihexyphenidyl 2 milliGRAM(s) Oral three times a day    MEDICATIONS  (PRN):  dextrose 40% Gel 15 Gram(s) Oral once PRN Blood Glucose LESS THAN 70 milliGRAM(s)/deciliter  glucagon  Injectable 1 milliGRAM(s) IntraMuscular once PRN Glucose LESS THAN 70 milligrams/deciliter      LABS:                            10.0   12.99 )-----------( 372      ( 15 Oct 2019 06:09 )             30.6     Hemoglobin: 10.0 g/dL (10-15 @ 06:09)  Hemoglobin: 9.4 g/dL (10-14 @ 06:45)  Hemoglobin: 9.7 g/dL (10-13 @ 06:53)  Hemoglobin: 9.6 g/dL (10-12 @ 06:02)  Hemoglobin: 10.0 g/dL (10-11 @ 06:34)    10-15    135  |  96  |  46<H>  ----------------------------<  110<H>  4.0   |  24  |  3.66<H>    Ca    9.3      15 Oct 2019 06:06      Creatinine Trend: 3.66<--, 3.66<--, 3.67<--, 3.79<--, 3.56<--, 3.27<--       10-14-19 @ 07:01  -  10-15-19 @ 07:00  --------------------------------------------------------  IN: 610 mL / OUT: 1250 mL / NET: -640 mL    10-15-19 @ 07:01  -  10-15-19 @ 13:18  --------------------------------------------------------  IN: 360 mL / OUT: 650 mL / NET: -290 mL        PHYSICAL EXAM  Vital Signs Last 24 Hrs  T(C): 36.7 (15 Oct 2019 12:52), Max: 36.7 (15 Oct 2019 05:44)  T(F): 98.1 (15 Oct 2019 12:52), Max: 98.1 (15 Oct 2019 05:44)  HR: 82 (15 Oct 2019 12:52) (56 - 82)  BP: 152/71 (15 Oct 2019 12:52) (117/60 - 152/71)  BP(mean): --  RR: 18 (15 Oct 2019 12:52) (18 - 18)  SpO2: 96% (15 Oct 2019 12:52) (95% - 100%)      Gen: Appears well in NAD  HEENT:  (-)icterus (-)pallor  CV: N S1 S2 1/6 JENNIFER (+)2 Pulses B/l  Resp:  Clear to auscultation B/L, normal effort  GI: (+) BS Soft, NT, ND  Lymph:  (+) B/L LE Edema, (-)obvious lymphadenopathy  Skin: Warm to touch, Normal turgor  Psych: Appropriate mood and affect      TELEMETRY: SB/SR 50s-60s        DIAGNOSTIC TESTING:  [ ] Echocardiogram: < from: Intra-Operative Transesophageal Echo (10.02.19 @ 13:30) >  Conclusions:  1. Mild segmental left ventricular systolic dysfunction.  Apicl hypokinesis  2. Moderate diastolic dysfunction (Stage II).  Confirmed on  10/2/2019 - 14:16:07 by Daniel Duran M.D.  ------------------------------------------------------------------------    < end of copied text >      ASSESSMENT/PLAN:   68 yo M PMHx CAD s/p stent x 1 (2010), T2DM, HTN, Parkinson's, history of meningioma, admitted with NSTEMI, TTE with mild segmental LV dysfxn, cath with multivessel CAD, s/p C5L LIMA-LAD, SVG-RPDA, SVG-RPL, SVG-Ramus, SVG-OM 10/2    - Remains in sinus on tele  - Cont ASA, statin   - Tolerating Amio/BB  - GI / DVT prophylaxis, - keep K>4, mg >2.0   - Renal f/u appreciated - trend creatinine - continue PO diuresis  - Supportive care per CTS appreciated  - Acute rehab planning per primary team  - Patient to f/u with his cardiologist Dr. Wagner Simons after discharge    Yunier Quiroz PA-C  [facility]  Pager: [phone]

## 2019-10-15 NOTE — PROGRESS NOTE ADULT - PROBLEM SELECTOR PLAN 5
Continue Tx, FU

## 2019-10-15 NOTE — PROGRESS NOTE ADULT - PROBLEM SELECTOR PROBLEM 4
Bradycardia
Bradycardia
Hypertension
Bradycardia
CKD (chronic kidney disease)
Hypertension
CKD (chronic kidney disease)
Bradycardia
CKD (chronic kidney disease)

## 2019-10-15 NOTE — PROGRESS NOTE ADULT - SUBJECTIVE AND OBJECTIVE BOX
[facility]      Seen earlier today, and examined.  - Today, patient is without complaints.           *****MEDICATIONS: Current medication reviewed and documented.    MEDICATIONS  (STANDING):  acetaminophen   Tablet .. 325 milliGRAM(s) Oral once  amiodarone    Tablet 200 milliGRAM(s) Oral daily  aspirin enteric coated 325 milliGRAM(s) Oral daily  atorvastatin 80 milliGRAM(s) Oral at bedtime  bisacodyl Suppository 10 milliGRAM(s) Rectal once  carbidopa/levodopa  25/100 2 Tablet(s) Oral three times a day  chlorhexidine 2% Cloths 1 Application(s) Topical <User Schedule>  dextrose 5%. 1000 milliLiter(s) (50 mL/Hr) IV Continuous <Continuous>  dextrose 50% Injectable 12.5 Gram(s) IV Push once  dextrose 50% Injectable 25 Gram(s) IV Push once  dextrose 50% Injectable 25 Gram(s) IV Push once  dextrose 50% Injectable 50 milliLiter(s) IV Push every 15 minutes  docusate sodium 100 milliGRAM(s) Oral three times a day  epoetin mari Injectable 4000 Unit(s) SubCutaneous every 7 days  ferrous    sulfate 325 milliGRAM(s) Oral daily  folic acid 1 milliGRAM(s) Oral daily  heparin  Injectable 5000 Unit(s) SubCutaneous every 8 hours  insulin glargine Injectable (LANTUS) 22 Unit(s) SubCutaneous at bedtime  insulin lispro (HumaLOG) corrective regimen sliding scale   SubCutaneous three times a day before meals  insulin lispro (HumaLOG) corrective regimen sliding scale   SubCutaneous at bedtime  insulin lispro Injectable (HumaLOG) 8 Unit(s) SubCutaneous three times a day before meals  levothyroxine 50 MICROGram(s) Oral daily  metoprolol tartrate 25 milliGRAM(s) Oral every 8 hours  pantoprazole    Tablet 40 milliGRAM(s) Oral before breakfast  polyethylene glycol 3350 17 Gram(s) Oral daily  sodium chloride 0.9% lock flush 3 milliLiter(s) IV Push every 8 hours  torsemide 10 milliGRAM(s) Oral daily  trihexyphenidyl 2 milliGRAM(s) Oral three times a day    MEDICATIONS  (PRN):  dextrose 40% Gel 15 Gram(s) Oral once PRN Blood Glucose LESS THAN 70 milliGRAM(s)/deciliter  glucagon  Injectable 1 milliGRAM(s) IntraMuscular once PRN Glucose LESS THAN 70 milligrams/deciliter          ***** VITAL SIGNS:  T(F): 98.1 (10-15-19 @ 12:52), Max: 98.1 (10-15-19 @ 05:44)  HR: 82 (10-15-19 @ 12:52) (56 - 82)  BP: 152/71 (10-15-19 @ 12:52) (134/70 - 152/71)  RR: 18 (10-15-19 @ 12:52) (18 - 18)  SpO2: 96% (10-15-19 @ 12:52) (96% - 100%)  Wt(kg): --  ,   I&O's Summary    14 Oct 2019 07:01  -  15 Oct 2019 07:00  --------------------------------------------------------  IN: 610 mL / OUT: 1250 mL / NET: -640 mL    15 Oct 2019 07:01  -  15 Oct 2019 16:24  --------------------------------------------------------  IN: 410 mL / OUT: 1200 mL / NET: -790 mL             *****PHYSICAL EXAM:  alert oriented x 3 attention comprehension are fair.  Able to name, repeat. somewhat agitated   EOmi fundi not visualized   no nystagmus VFF to confrontation  Tongue is midline  Palate elevates symmetrically   Moving all 4 ext spontaneously no drift appreciated    B/l tremors left > than right   Gait not assessed.            *****LAB AND IMAGING:                        10.0   12.99 )-----------( 372      ( 15 Oct 2019 06:09 )             30.6               10-15    135  |  96  |  46<H>  ----------------------------<  110<H>  4.0   |  24  |  3.66<H>    Ca    9.3      15 Oct 2019 06:06                           [All pertinent recent Imaging/Reports reviewed]           *****A S S E S S M E N T   A N D   P L A N :      :68 yo M PMHx CAD s/p stent x 1 (2010), T2DM, HTN, Parkinson's presenting with c/o 10/10 chest pain described as burning radiating from abdomen to midsternum x 1 day. Patient reports symptoms started at 2 pm and resolved after 1 hour. Chest pain began after he ate lunch and was walking up a flight of stairs. He reports chest pain was associated with dyspnea, diaphoresis, and nausea. At baseline, he ambulates with cane/walker. Takes medications daily including aspirin and prasugrel. On ROS, reports chronic left leg swelling x 1-2 years.        Problem/Recommendations 1: Dizziness of unclear etiology   s/p cabg,    doing well ambulating oob to chair  continue asa/atorvastatin for secondary prophy    Problem/Recommendations 2: Parkinson's resting tremor slightly worse ? related to amiodarone which is temporary   continue current regime  comtan with no appreciable benefit         Thank you for allowing me to participate in the care of this patient. Please do not hesitate to call me if you have any  questions.        ________________  Lily Fang MD  [facility] ([facility]  [phone]      33 minutes spent on total encounter; more than 50 % of the visit was  spent counseling about plan of care, compliance to diet/exercise and medication regimen and or  coordinating care by the attending physician.      It is advised that stroke patients follow up with ZACH Albarran @ [phone] in 1- 2 weeks.   Others please follow up with Dr. Michael Nissenbaum [phone]

## 2019-10-15 NOTE — DISCHARGE NOTE NURSING/CASE MANAGEMENT/SOCIAL WORK - NSDCFUADDAPPT_GEN_ALL_CORE_FT
follow up appt with DR. Ty Guillory the week you arrive home from rehab  [phone]  follow up appt with your Cardiologist and /or Primary Care MD with 2-3 weeks

## 2019-10-15 NOTE — PROGRESS NOTE ADULT - PROBLEM SELECTOR PROBLEM 1
CAD (coronary artery disease)
Diabetes Mellitus, Type 2
CAD (coronary artery disease)
Diabetes Mellitus, Type 2
S/P CABG x 5
CAD (coronary artery disease)
CAD (coronary artery disease)

## 2019-10-15 NOTE — PROGRESS NOTE ADULT - PROBLEM SELECTOR PLAN 3
Glycemic control   Continue on Lantus units SQ HS   Continue on Humalog units pre meal TID   endo cslt today  Continue with Accuchecks AC and HS with RISS coverage
Glycemic control   Continue on Lantus units SQ HS   Continue on Humalog units pre meal TID   endo cslt today  Continue with Accuchecks AC and HS with RISS coverage
Glycemic control   Continue on Lantus 20 units SQ HS   Continue on Humalog 3 units pre meal TID  Continue with Accuchecks AC and HS with RISS coverage
On medications,  no chest pain, stable, monitored and followed up by cardiothoracic team/cardiology team
On medications,  no chest pain, stable, monitored and followed up by cardiothoracic team/cardiology team
S/P CABG. On medications,  no chest pain, stable, monitored and followed up by cardiothoracic team/cardiology team
CABG today. On medications,  no chest pain, stable, monitored and followed up by cardiothoracic team/cardiology team
Endocrine following   Continue with Accuchecks w/ RISS AC and HS   Continue with Lantus    Continue with Humalog
Endocrine following   Continue with Accuchecks w/ RISS AC and HS   Continue with Latus 28 units HS   Continue with Humalog 10 unit premeal TID
Glycemic control   Continue on Lantus 20 units SQ HS   Continue on Humalog 3 units pre meal TID  Continue with Accuchecks AC and HS with RISS coverage
Glycemic control   Continue on Lantus units SQ HS   Continue on Humalog units pre meal TID   endo cslt today  Continue with Accuchecks AC and HS with RISS coverage
S/P CABG. On medications,  no chest pain, stable, monitored and followed up by cardiothoracic team/cardiology team
Endocrine following   Continue with Accuchecks w/ RISS AC and HS   Continue with Latus 28 units HS   Continue with Humalog 10 unit premeal TID
Endocrine following   Continue with Accuchecks w/ RISS AC and HS   Continue with Latus 28 units HS   Continue with Humalog 10 unit premeal TID

## 2019-10-15 NOTE — PROGRESS NOTE ADULT - PROBLEM SELECTOR PROBLEM 2
Parkinson disease
Parkinson disease
Hypothyroidism
Parkinson disease

## 2019-10-15 NOTE — PROGRESS NOTE ADULT - PROBLEM SELECTOR PROBLEM 5
Parkinson disease

## 2019-10-15 NOTE — PROGRESS NOTE ADULT - ASSESSMENT
Assessment  DMT2: 69y Male with DM T2 with hyperglycemia, A1C 7.2%, was on oral meds and insulin at home, S/P CABG on insulin, FS fluctating, eating full meals, appears lethargic. Planning d/c to acute rehab once medically cleared.  Hypothyroidism: Patient has no hx thyroid dz, was not on any thyroid supplements, now on Synthroid, compliant with intake.  CAD: postop CABG 10/2, on medications, no chest pain, stable, monitored.  HTN: Controlled,  on antihypertensive medications.  Parkinson's: on meds, stable.          Kelsie Reece MD  Cell: [phone]  Office: [phone]

## 2019-10-15 NOTE — DISCHARGE NOTE PROVIDER - NSDCCPCAREPLAN_GEN_ALL_CORE_FT
PRINCIPAL DISCHARGE DIAGNOSIS  Diagnosis: S/P CABG x 5  Assessment and Plan of Treatment: physical therapy as per rehab  cbc, basic metabolic mondays & thursdays in rehab & prn  pt must be weighed daily - report weight gain > 2.5 pounds overnight to MD/PA/NP on call @ rehab  shower daily  follow up appt ih Dr. Ty Guillory when you arrive home from rehab [phone]

## 2019-10-15 NOTE — PROGRESS NOTE ADULT - SUBJECTIVE AND OBJECTIVE BOX
Chief complaint  Patient is a 69y old  Male who presents with a chief complaint of Multivessel disease (15 Oct 2019 13:17)   Review of systems  Patient in bed, looks comfortable, no fever, no hypoglycemia.    Labs and Fingersticks  CAPILLARY BLOOD GLUCOSE      POCT Blood Glucose.: 281 mg/dL (15 Oct 2019 11:53)  POCT Blood Glucose.: 134 mg/dL (15 Oct 2019 10:02)  POCT Blood Glucose.: 150 mg/dL (15 Oct 2019 07:44)  POCT Blood Glucose.: 150 mg/dL (14 Oct 2019 22:15)  POCT Blood Glucose.: 176 mg/dL (14 Oct 2019 17:57)  POCT Blood Glucose.: 123 mg/dL (14 Oct 2019 17:15)      Anion Gap, Serum: 15 (10-15 @ 06:06)  Anion Gap, Serum: 13 (10-14 @ 06:45)      Calcium, Total Serum: 9.3 (10-15 @ 06:06)  Calcium, Total Serum: 9.2 (10-14 @ 06:45)          10-15    135  |  96  |  46<H>  ----------------------------<  110<H>  4.0   |  24  |  3.66<H>    Ca    9.3      15 Oct 2019 06:06                          10.0   12.99 )-----------( 372      ( 15 Oct 2019 06:09 )             30.6     Medications  MEDICATIONS  (STANDING):  acetaminophen   Tablet .. 325 milliGRAM(s) Oral once  amiodarone    Tablet 200 milliGRAM(s) Oral daily  aspirin enteric coated 325 milliGRAM(s) Oral daily  atorvastatin 80 milliGRAM(s) Oral at bedtime  bisacodyl Suppository 10 milliGRAM(s) Rectal once  carbidopa/levodopa  25/100 2 Tablet(s) Oral three times a day  chlorhexidine 2% Cloths 1 Application(s) Topical <User Schedule>  dextrose 5%. 1000 milliLiter(s) (50 mL/Hr) IV Continuous <Continuous>  dextrose 50% Injectable 12.5 Gram(s) IV Push once  dextrose 50% Injectable 25 Gram(s) IV Push once  dextrose 50% Injectable 25 Gram(s) IV Push once  dextrose 50% Injectable 50 milliLiter(s) IV Push every 15 minutes  docusate sodium 100 milliGRAM(s) Oral three times a day  epoetin mari Injectable 4000 Unit(s) SubCutaneous every 7 days  ferrous    sulfate 325 milliGRAM(s) Oral daily  folic acid 1 milliGRAM(s) Oral daily  heparin  Injectable 5000 Unit(s) SubCutaneous every 8 hours  insulin glargine Injectable (LANTUS) 22 Unit(s) SubCutaneous at bedtime  insulin lispro (HumaLOG) corrective regimen sliding scale   SubCutaneous three times a day before meals  insulin lispro (HumaLOG) corrective regimen sliding scale   SubCutaneous at bedtime  insulin lispro Injectable (HumaLOG) 8 Unit(s) SubCutaneous three times a day before meals  levothyroxine 50 MICROGram(s) Oral daily  metoprolol tartrate 25 milliGRAM(s) Oral every 8 hours  pantoprazole    Tablet 40 milliGRAM(s) Oral before breakfast  polyethylene glycol 3350 17 Gram(s) Oral daily  sodium chloride 0.9% lock flush 3 milliLiter(s) IV Push every 8 hours  torsemide 10 milliGRAM(s) Oral daily  trihexyphenidyl 2 milliGRAM(s) Oral three times a day      Physical Exam  General: Patient comfortable in bed  Vital Signs Last 12 Hrs  T(F): 98.1 (10-15-19 @ 12:52), Max: 98.1 (10-15-19 @ 05:44)  HR: 82 (10-15-19 @ 12:52) (56 - 82)  BP: 152/71 (10-15-19 @ 12:52) (136/72 - 152/71)  BP(mean): --  RR: 18 (10-15-19 @ 12:52) (18 - 18)  SpO2: 96% (10-15-19 @ 12:52) (96% - 97%)  Neck: No palpable thyroid nodules.  CVS: S1S2, No murmurs  Respiratory: No wheezing, no crepitations  GI: Abdomen soft, bowel sounds positive  Musculoskeletal:  edema lower extremities.   Skin: No skin rashes, no ecchymosis    Diagnostics    Free Thyroxine, Serum: AM Sched. Collection: 15-Oct-2019 06:00 (10-14 @ 14:25)

## 2019-10-15 NOTE — DISCHARGE NOTE PROVIDER - HOSPITAL COURSE
70 yo M PMHx CAD s/p stent x 1 (2010), T2DM, HTN, Parkinson's presenting with c/o 10/10 chest pain described as burning radiating from abdomen to midsternum x 1 day. Patient reports symptoms started at 2 pm and resolved after 1 hour. Chest pain began after he ate lunch and was walking up a flight of stairs. He reports chest pain was associated with dyspnea, diaphoresis, and nausea. At baseline, he ambulates with cane/walker. Takes medications daily including aspirin and prasugrel. On ROS, reports chronic left leg swelling x 1-2 years. Pt s/p cardiac cath at [facility] found to have multivessel disease requiring transfer to [facility] CT surgery service.     9/26 Nephrology consulted, aldactone discontinued. Pre op Transthoracic Echocardiogram Mitral annular calcification, otherwise normal mitral    valve. Calcified trileaflet aortic valve with normal opening. Mild aortic regurgitation. Mild concentric left ventricular hypertrophy. Endocardium not well visualized; grossly normal left ventricular systolic function.    9/27 VVS; Pre op Cardiac Surgery work up in progress. PT evaluation to assess pre op baseline activity level.  Plan for cardiac surgery in next week Wednesday with Dr. Guillory.    On 10/2/19 C5L LIMA to LAD, SVG to RPDA, SVG to RPL, SVG to Ramus, SVG to OM.     Post op Course:     Extubated on POD # 1    Pressor support required à weaned off     (+) dizziness à Neurology following à Unclear of etiology     CKD à Renal following; on Torsemide 10 mg PO daily      Hyperglycemia / Hypothyroid à Endo following. Continue on Synthroid 50 mcg PO daily.     10/7 Transferred to 2 [facility].     10/8 VSS - SR 58 - pt offers no complaints- alex patent, d/c plan to rehab    10/9 HD stable. Pt yet to have BM post-op-given mag citrate this PM.     Javier d/c'd today. Voiding well post-removal.     Discharge planning-*change* re-eval-acute rehab.     10/10: HD stable. CXR today-small left pleural effusion-encouraging improved pulm toilet.    Voiding well since Javier dc'd yesterday.    BM x3 overnight after mag citrate yesterday PM.                                                                                    10/12: Increase in creatinine today. D/w with Kahlil- will continue to watch for now-renal f/u            10/14: VSS. Telemetry: SR 50-60s, on Lopressor 25mg Q8. Renal evaluated pt for inc fluid-start on Torsemide 10mg QD per Dr. Frias's recommendation. Remains awaiting placement at acute versus subacute rehab.     10/15 VSS - pt denied acute rehab by DR. Mcclure, d/w DR. Giovanni Wan-covering DR. Guillory - daughter-in-law made aware of change in disposition.  will d/c to [facility] @ 3pm

## 2019-10-15 NOTE — DISCHARGE NOTE NURSING/CASE MANAGEMENT/SOCIAL WORK - NSDCPNINST_GEN_ALL_CORE
surgery dos and don't discussed with patient. discharge paperwork discussed with patient verbalizes understanding

## 2019-10-15 NOTE — DISCHARGE NOTE NURSING/CASE MANAGEMENT/SOCIAL WORK - PATIENT PORTAL LINK FT
You can access the FollowMyHealth Patient Portal offered by [facility] by registering at the following website: [email or web address]. By joining [employer]’s FollowMyHealth portal, you will also be able to view your health information using other applications (apps) compatible with our system.

## 2019-10-16 RX ORDER — INSULIN ASPART 100 [IU]/ML
100 INJECTION, SOLUTION INTRAVENOUS; SUBCUTANEOUS
Qty: 45 | Refills: 0 | Status: DISCONTINUED | COMMUNITY
Start: 2017-03-16 | End: 2019-10-16

## 2019-10-16 RX ORDER — METHYLPREDNISOLONE 4 MG/1
4 TABLET ORAL
Qty: 21 | Refills: 0 | Status: DISCONTINUED | COMMUNITY
Start: 2017-06-27 | End: 2019-10-16

## 2019-10-16 RX ORDER — LOSARTAN POTASSIUM AND HYDROCHLOROTHIAZIDE 25; 100 MG/1; MG/1
100-25 TABLET ORAL
Qty: 90 | Refills: 0 | Status: DISCONTINUED | COMMUNITY
Start: 2017-04-13 | End: 2019-10-16

## 2019-10-16 RX ORDER — OMEGA-3-ACID ETHYL ESTERS CAPSULES 1 G/1
1 CAPSULE, LIQUID FILLED ORAL
Qty: 270 | Refills: 0 | Status: DISCONTINUED | COMMUNITY
Start: 2017-03-17 | End: 2019-10-16

## 2019-10-16 RX ORDER — ASPIRIN 325 MG/1
325 TABLET ORAL DAILY
Refills: 0 | Status: ACTIVE | COMMUNITY
Start: 2019-10-16

## 2019-10-16 RX ORDER — AMANTADINE HYDROCHLORIDE 100 MG/1
100 CAPSULE ORAL
Qty: 180 | Refills: 0 | Status: DISCONTINUED | COMMUNITY
Start: 2017-02-10 | End: 2019-10-16

## 2019-10-16 RX ORDER — INSULIN LISPRO 100 [IU]/ML
100 INJECTION, SOLUTION INTRAVENOUS; SUBCUTANEOUS
Refills: 0 | Status: ACTIVE | COMMUNITY
Start: 2019-10-16

## 2019-10-16 RX ORDER — PEN NEEDLE, DIABETIC 32 GX 1/4"
32G X 6 MM NEEDLE, DISPOSABLE MISCELLANEOUS
Qty: 400 | Refills: 0 | Status: DISCONTINUED | COMMUNITY
Start: 2017-05-11 | End: 2019-10-16

## 2019-10-16 RX ORDER — CIPROFLOXACIN HYDROCHLORIDE 500 MG/1
500 TABLET, FILM COATED ORAL
Qty: 14 | Refills: 0 | Status: DISCONTINUED | COMMUNITY
Start: 2017-06-27 | End: 2019-10-16

## 2019-10-24 ENCOUNTER — APPOINTMENT (OUTPATIENT)
Dept: CARE COORDINATION | Facility: HOME HEALTH | Age: 69
End: 2019-10-24

## 2019-10-30 ENCOUNTER — APPOINTMENT (OUTPATIENT)
Dept: CARE COORDINATION | Facility: HOME HEALTH | Age: 69
End: 2019-10-30

## 2019-10-31 RX ORDER — AMIODARONE HYDROCHLORIDE 200 MG/1
200 TABLET ORAL DAILY
Refills: 0 | Status: DISCONTINUED | COMMUNITY
Start: 2019-10-16 | End: 2019-10-31

## 2019-11-08 ENCOUNTER — APPOINTMENT (OUTPATIENT)
Dept: CARE COORDINATION | Facility: HOME HEALTH | Age: 69
End: 2019-11-08
Payer: MEDICARE

## 2019-11-08 VITALS
DIASTOLIC BLOOD PRESSURE: 78 MMHG | RESPIRATION RATE: 16 BRPM | SYSTOLIC BLOOD PRESSURE: 128 MMHG | OXYGEN SATURATION: 98 % | WEIGHT: 229 LBS | BODY MASS INDEX: 31.94 KG/M2 | HEART RATE: 67 BPM

## 2019-11-08 PROCEDURE — 99024 POSTOP FOLLOW-UP VISIT: CPT

## 2019-11-08 NOTE — PHYSICAL EXAM
[Respiration, Rhythm And Depth] : normal respiratory rhythm and effort [] : no respiratory distress [Heart Rate And Rhythm] : heart rate was normal and rhythm regular [Exaggerated Use Of Accessory Muscles For Inspiration] : no accessory muscle use [Heart Sounds] : normal S1 and S2 [Bowel Sounds] : normal bowel sounds [FreeTextEntry1] : MSI, CT sites and SVG sites without erythema, drainage or warmth, with edges well approximated.  Sternum stable. BLE 2+ edema [Abdomen Soft] : soft [Abdomen Tenderness] : non-tender

## 2019-11-08 NOTE — HISTORY OF PRESENT ILLNESS
[FreeTextEntry1] : 69M s/p cabg Dr. Guillory, home from [facility] rehab for 2 days.  Lives with son and his family.  Pt with Parkinsons.

## 2019-11-12 PROCEDURE — 82570 ASSAY OF URINE CREATININE: CPT

## 2019-11-12 PROCEDURE — 97116 GAIT TRAINING THERAPY: CPT

## 2019-11-12 PROCEDURE — 82947 ASSAY GLUCOSE BLOOD QUANT: CPT

## 2019-11-12 PROCEDURE — 85014 HEMATOCRIT: CPT

## 2019-11-12 PROCEDURE — P9047: CPT

## 2019-11-12 PROCEDURE — 84484 ASSAY OF TROPONIN QUANT: CPT

## 2019-11-12 PROCEDURE — 82962 GLUCOSE BLOOD TEST: CPT

## 2019-11-12 PROCEDURE — C1751: CPT

## 2019-11-12 PROCEDURE — 94002 VENT MGMT INPAT INIT DAY: CPT

## 2019-11-12 PROCEDURE — 86923 COMPATIBILITY TEST ELECTRIC: CPT

## 2019-11-12 PROCEDURE — 36430 TRANSFUSION BLD/BLD COMPNT: CPT

## 2019-11-12 PROCEDURE — 97110 THERAPEUTIC EXERCISES: CPT

## 2019-11-12 PROCEDURE — 82565 ASSAY OF CREATININE: CPT

## 2019-11-12 PROCEDURE — 85384 FIBRINOGEN ACTIVITY: CPT

## 2019-11-12 PROCEDURE — 85730 THROMBOPLASTIN TIME PARTIAL: CPT

## 2019-11-12 PROCEDURE — 83735 ASSAY OF MAGNESIUM: CPT

## 2019-11-12 PROCEDURE — 71045 X-RAY EXAM CHEST 1 VIEW: CPT

## 2019-11-12 PROCEDURE — 86850 RBC ANTIBODY SCREEN: CPT

## 2019-11-12 PROCEDURE — 86900 BLOOD TYPING SEROLOGIC ABO: CPT

## 2019-11-12 PROCEDURE — 82330 ASSAY OF CALCIUM: CPT

## 2019-11-12 PROCEDURE — 82533 TOTAL CORTISOL: CPT

## 2019-11-12 PROCEDURE — P9012: CPT

## 2019-11-12 PROCEDURE — 82436 ASSAY OF URINE CHLORIDE: CPT

## 2019-11-12 PROCEDURE — 85610 PROTHROMBIN TIME: CPT

## 2019-11-12 PROCEDURE — 94010 BREATHING CAPACITY TEST: CPT

## 2019-11-12 PROCEDURE — 97530 THERAPEUTIC ACTIVITIES: CPT

## 2019-11-12 PROCEDURE — 86901 BLOOD TYPING SEROLOGIC RH(D): CPT

## 2019-11-12 PROCEDURE — 82553 CREATINE MB FRACTION: CPT

## 2019-11-12 PROCEDURE — 83880 ASSAY OF NATRIURETIC PEPTIDE: CPT

## 2019-11-12 PROCEDURE — 97166 OT EVAL MOD COMPLEX 45 MIN: CPT

## 2019-11-12 PROCEDURE — 71046 X-RAY EXAM CHEST 2 VIEWS: CPT

## 2019-11-12 PROCEDURE — 82550 ASSAY OF CK (CPK): CPT

## 2019-11-12 PROCEDURE — 83036 HEMOGLOBIN GLYCOSYLATED A1C: CPT

## 2019-11-12 PROCEDURE — 85576 BLOOD PLATELET AGGREGATION: CPT

## 2019-11-12 PROCEDURE — 84300 ASSAY OF URINE SODIUM: CPT

## 2019-11-12 PROCEDURE — 81001 URINALYSIS AUTO W/SCOPE: CPT

## 2019-11-12 PROCEDURE — 82803 BLOOD GASES ANY COMBINATION: CPT

## 2019-11-12 PROCEDURE — 80053 COMPREHEN METABOLIC PANEL: CPT

## 2019-11-12 PROCEDURE — C1889: CPT

## 2019-11-12 PROCEDURE — P9037: CPT

## 2019-11-12 PROCEDURE — 80048 BASIC METABOLIC PNL TOTAL CA: CPT

## 2019-11-12 PROCEDURE — 85027 COMPLETE CBC AUTOMATED: CPT

## 2019-11-12 PROCEDURE — 93005 ELECTROCARDIOGRAM TRACING: CPT

## 2019-11-12 PROCEDURE — 84439 ASSAY OF FREE THYROXINE: CPT

## 2019-11-12 PROCEDURE — 84133 ASSAY OF URINE POTASSIUM: CPT

## 2019-11-12 PROCEDURE — 84443 ASSAY THYROID STIM HORMONE: CPT

## 2019-11-12 PROCEDURE — 93306 TTE W/DOPPLER COMPLETE: CPT

## 2019-11-12 PROCEDURE — 84156 ASSAY OF PROTEIN URINE: CPT

## 2019-11-12 PROCEDURE — 84132 ASSAY OF SERUM POTASSIUM: CPT

## 2019-11-12 PROCEDURE — 87640 STAPH A DNA AMP PROBE: CPT

## 2019-11-12 PROCEDURE — 83605 ASSAY OF LACTIC ACID: CPT

## 2019-11-12 PROCEDURE — C1769: CPT

## 2019-11-12 PROCEDURE — 97164 PT RE-EVAL EST PLAN CARE: CPT

## 2019-11-12 PROCEDURE — 97161 PT EVAL LOW COMPLEX 20 MIN: CPT

## 2019-11-12 PROCEDURE — 84295 ASSAY OF SERUM SODIUM: CPT

## 2019-11-12 PROCEDURE — 97535 SELF CARE MNGMENT TRAINING: CPT

## 2019-11-12 PROCEDURE — 84100 ASSAY OF PHOSPHORUS: CPT

## 2019-11-12 PROCEDURE — P9016: CPT

## 2019-11-12 PROCEDURE — 86891 AUTOLOGOUS BLOOD OP SALVAGE: CPT

## 2019-11-12 PROCEDURE — 93880 EXTRACRANIAL BILAT STUDY: CPT

## 2019-11-12 PROCEDURE — 82435 ASSAY OF BLOOD CHLORIDE: CPT

## 2019-11-13 ENCOUNTER — APPOINTMENT (OUTPATIENT)
Dept: CARDIOTHORACIC SURGERY | Facility: CLINIC | Age: 69
End: 2019-11-13

## 2019-11-13 VITALS
TEMPERATURE: 97.7 F | OXYGEN SATURATION: 97 % | DIASTOLIC BLOOD PRESSURE: 92 MMHG | SYSTOLIC BLOOD PRESSURE: 161 MMHG | RESPIRATION RATE: 12 BRPM | HEART RATE: 82 BPM

## 2019-11-13 VITALS — HEIGHT: 71 IN | BODY MASS INDEX: 32.06 KG/M2 | WEIGHT: 229 LBS

## 2019-11-13 RX ORDER — POLYETHYLENE GLYCOL 3350 17 G/17G
17 POWDER, FOR SOLUTION ORAL
Refills: 0 | Status: COMPLETED | COMMUNITY
Start: 2019-10-16 | End: 2019-11-13

## 2019-11-13 RX ORDER — FOLIC ACID 1 MG/1
1 TABLET ORAL DAILY
Qty: 90 | Refills: 0 | Status: ACTIVE | COMMUNITY
Start: 2019-10-16 | End: 1900-01-01

## 2019-11-13 RX ORDER — ATORVASTATIN CALCIUM 80 MG/1
80 TABLET, FILM COATED ORAL
Qty: 90 | Refills: 2 | Status: ACTIVE | COMMUNITY
Start: 2019-10-16 | End: 1900-01-01

## 2019-11-13 RX ORDER — METOPROLOL TARTRATE 25 MG/1
25 TABLET, FILM COATED ORAL
Qty: 180 | Refills: 0 | Status: ACTIVE | COMMUNITY
Start: 2019-10-16 | End: 1900-01-01

## 2019-11-13 RX ORDER — PANTOPRAZOLE 40 MG/1
40 TABLET, DELAYED RELEASE ORAL DAILY
Refills: 0 | Status: COMPLETED | COMMUNITY
Start: 2019-10-16 | End: 2019-11-13

## 2019-12-17 ENCOUNTER — APPOINTMENT (OUTPATIENT)
Dept: NEUROLOGY | Facility: CLINIC | Age: 69
End: 2019-12-17
Payer: MEDICARE

## 2019-12-17 PROCEDURE — 99214 OFFICE O/P EST MOD 30 MIN: CPT

## 2019-12-20 ENCOUNTER — MEDICATION RENEWAL (OUTPATIENT)
Age: 69
End: 2019-12-20

## 2020-01-22 ENCOUNTER — APPOINTMENT (OUTPATIENT)
Dept: MRI IMAGING | Facility: CLINIC | Age: 70
End: 2020-01-22

## 2020-01-24 NOTE — HISTORY OF PRESENT ILLNESS
[FreeTextEntry1] : 70 yo man with PD since 2012. \par \par 1. Since last visit, had AMI, requiring CABGx5. Still recovering. Was in rehab, now home. On metoprolol, ASA, lasix. Also on levothyroxine andinsulin\par 2. "Some days I am doing great" Taking sinemet 2 -2 - 2 tablet (3x/day, every 6 hours), and artane 2mg 3x/day (tried to taper off; tremor got worse), which worked well. Gets very tired about an hour after taking the medication. Did not try Sinememt ER. Feels good in the morning. No falls, uses cane. No clear motor fluctuations, but different days can be different.   \par 2. No hallucinations. Anxiety not an issue according to him, but depression may be.  He was on Paxil in the past (started in rehab) but stopped it because he did not think he needed it. Gets flustered easily. Sleeps fitfully, no RBD. \par 4. Was following with neurosurgery for a meningioma, MRI in October 2018 showed stable right meningioma\par 5. Saw a back pain specialist, given meloxicam.

## 2020-01-24 NOTE — DISCUSSION/SUMMARY
[FreeTextEntry1] : 1) PD since 2012, much improved on sinemet and artane. \par 2) Anxiety and depression\par \par 1. Continue sinemet and artane. Trial of rytary to see if that has less side effects\par 2. For anxiety, monitor, would recommend medications, he wants to wait. \par 3. PT, start in January. \par 4. Meningioma - repeat MRI, needs sedation\par 5. Walker with seat

## 2020-01-24 NOTE — PHYSICAL EXAM
[Person] : oriented to person [Place] : oriented to place [Time] : oriented to time [Registration Intact] : recent registration memory intact [Short Term Intact] : short term memory intact [Motor Handedness Right-Handed] : the patient is right hand dominant [FreeTextEntry4] : 2/3 delayed recall, 3/3 with hints [FreeTextEntry1] : Improved masked face, decreased blink, voice ok. EOMI, no SWJ. Tone increased in neck, and left>right arm, and left leg. Parkinsonian tremor left arm and left leg. RAHM and foot slowed on left>right (worse on left). Able to get up from chair without arms. Gait with improved stride, multistep turn. Pull test negative.\par

## 2020-02-05 ENCOUNTER — FORM ENCOUNTER (OUTPATIENT)
Age: 70
End: 2020-02-05

## 2020-02-06 ENCOUNTER — APPOINTMENT (OUTPATIENT)
Dept: MRI IMAGING | Facility: HOSPITAL | Age: 70
End: 2020-02-06
Payer: MEDICARE

## 2020-02-06 ENCOUNTER — OUTPATIENT (OUTPATIENT)
Dept: OUTPATIENT SERVICES | Facility: HOSPITAL | Age: 70
LOS: 1 days | End: 2020-02-06
Payer: MEDICARE

## 2020-02-06 VITALS
RESPIRATION RATE: 18 BRPM | DIASTOLIC BLOOD PRESSURE: 88 MMHG | OXYGEN SATURATION: 96 % | HEART RATE: 60 BPM | SYSTOLIC BLOOD PRESSURE: 180 MMHG

## 2020-02-06 VITALS
HEART RATE: 68 BPM | TEMPERATURE: 97 F | DIASTOLIC BLOOD PRESSURE: 89 MMHG | RESPIRATION RATE: 16 BRPM | OXYGEN SATURATION: 100 % | SYSTOLIC BLOOD PRESSURE: 176 MMHG

## 2020-02-06 DIAGNOSIS — G20 PARKINSON'S DISEASE: ICD-10-CM

## 2020-02-06 DIAGNOSIS — Z95.9 PRESENCE OF CARDIAC AND VASCULAR IMPLANT AND GRAFT, UNSPECIFIED: Chronic | ICD-10-CM

## 2020-02-06 PROCEDURE — 70551 MRI BRAIN STEM W/O DYE: CPT | Mod: 26

## 2020-02-06 PROCEDURE — 70551 MRI BRAIN STEM W/O DYE: CPT

## 2020-06-12 ENCOUNTER — APPOINTMENT (OUTPATIENT)
Dept: NEUROLOGY | Facility: CLINIC | Age: 70
End: 2020-06-12
Payer: MEDICARE

## 2020-06-12 VITALS
HEIGHT: 71 IN | SYSTOLIC BLOOD PRESSURE: 183 MMHG | HEART RATE: 72 BPM | WEIGHT: 229 LBS | DIASTOLIC BLOOD PRESSURE: 87 MMHG | BODY MASS INDEX: 32.06 KG/M2

## 2020-06-12 VITALS — TEMPERATURE: 97.5 F

## 2020-06-12 PROCEDURE — 99214 OFFICE O/P EST MOD 30 MIN: CPT

## 2020-06-12 NOTE — DISCUSSION/SUMMARY
[FreeTextEntry1] : 1) PD since 2012, much improved on sinemet and artane, buht worse not than last year, and significant apathy\par 2) Anxiety and depression\par 3) Meningioma\par \par 1. Continue sinemet and artane. Trial of sinemet 2.5 pills each dose. Continue artane, can increase next if needed.\par 2. For anxiety, monitor, would recommend medications, he wants to wait. \par 3. PT and exercise\par 4. Meningioma - stable. \par 5. Walker with seat\par \par We discussed the above impression, plan and recommendations during the visit. Counseling represented more then 50% of the 30 minute visit time\par

## 2020-06-12 NOTE — PHYSICAL EXAM
[Person] : oriented to person [Place] : oriented to place [Time] : oriented to time [Short Term Intact] : short term memory intact [Registration Intact] : recent registration memory intact [Motor Handedness Right-Handed] : the patient is right hand dominant [FreeTextEntry4] : 2/3 delayed recall, 3/3 with hints [FreeTextEntry1] : Improved masked face, decreased blink, voice ok. EOMI, no SWJ. Tone increased in neck, and left>right arm, and left leg, worse than last visit. Parkinsonian tremor left arm and left leg. \par RAHM and foot slowed on left>right (worse on left). Able to get up from chair without arms. Gait with improved but shortened stride, multistep turn. Better with walker. Pull test negative.\par

## 2020-06-12 NOTE — HISTORY OF PRESENT ILLNESS
[FreeTextEntry1] : 71 yo man with PD since 2012. \par \par 1. Since last visit, had AMI, requiring CABGx5. Still recovering. Was in rehab, now home. On metoprolol, ASA, lasix. Also on levothyroxine and insulin\par 2.  More tremor. Taking sinemet 2 - 2 - 2 tablet (3x/day, every 6 hours), and artane 2mg 3x/day (tried to taper off; tremor got worse), which worked well overall. Gets very tired about an hour after taking the medication. Did not try Sinememt ER. Feels good in the morning. No falls, uses cane. No clear motor fluctuations, but different days can be different.  Tried rytary, too exepensive\par 3. Some forgetfulness. No hallucinations. Anxiety not an issue according to him, but depression may be (he denies, he says he is "annoyed"). He does have apathy. He was on Paxil in the past (started in rehab) but stopped it because he did not think he needed it. Gets flustered easily. Sleeps fitfully, no RBD. Manageable constipation.  \par 4. Was following with neurosurgery for a meningioma, MRI in October 2018 showed stable right meningioma. Repeat MRI 2/7/20 also stable.

## 2020-06-25 NOTE — PHYSICAL THERAPY INITIAL EVALUATION ADULT - PERTINENT HX OF CURRENT PROBLEM, REHAB EVAL
PHYSICAL THERAPY Initial Evaluation:    Date: 6/25/2020    Patient is a 84 year old male admitted to [facility] for abdominal pain with hydronephrosis. PMH significant for CAD, CKD, COPD, DM, HTN.  Pt lives alone in a tri level home with 15 stairs to his living space.   At baseline, patient reports mod indep with functional mobility with use of 2WW.      ASSESSMENT:   Patient presents to physical therapy below baseline functional mobility level of mod indep.  Patient is demonstrating decreased strength, balance deficits, diminished safety awareness, pain, decreased activity tolerance, postural problems, decreased endurance, diminished cognition which is limiting the completion of all functional mobility at this time.  Patient completes bed mobility with supervision with use of bed rail. He requires steadying assist initially for sit>stand transfer and pivoting with use of 2WW, but on second and third attempts at transfers requires min assist and assist of a second person secondary to impulsiveness and decreased safety awareness. At this time, due to safety concerns, recommend 2 person assist for pivot transfers. Patient's activity tolerance is limited by pain and decreased motivation for participation in therapy interventions. Anticipate that he will be unable to return to his previous home environment, given safety concerns and home-set-up. Further skilled physical therapy is required to address these limitations in attempt to maximize the patient's independence.     After today's session this therapist is recommending the following location for optimal discharge:  Recommendations for Discharge: Sub-acute nursing home  PT Identified Barriers to Discharge: home set-up, lives alone, safety concerns      Equipment for discharge: to be determined - continuing to assess needs at this time     Focus of today's therapy session:   PT evaluation, bed mobility, transfer training, standing balance activities, neuromuscular  re-education and patient/family education  See below for further details.    Treatment Plan for Next Session:   functional transfers, standing tolerance and balance, progress toward gait as able    Precautions: falls risk, impulsive  Activity orders: As tolerated  Loco Fall Scale Score: 100  Alarms: Chair alarm activated at end of session  Basic Lines: IV and Rayo  Collaboration with: OT and RN    SUBJECTIVE:   Patient reports agreeable to therapy  Patient's goal for therapy: return home    Pain:   Patient Currently in Pain: Yes  Pain Assessment Tool: Numeric Rating Scale 0-10  Numeric Rating Scale 0-10: (does not rate)     MOBILITY/EXERCISE:    Bed mobility:   Supine to Sit:  Supervision (Supv), Touching/Steadying Assistance  Reason for Assistance:  requires increased time to complete, safety due to line management    Transfers:   Sit to Stand:  Minimal Assistance (Min)  Stand to Sit:  Minimal Assistance (Min)  Stand pivot:  Minimal Assistance (Min)  Device Used:  gait belt, 2 wheeled walker and 2nd person for safety due to impulsivity  Reason for Assistance:  requires increased time to complete, safety due to impulsivity, weakness, verbal cues for task sequencing, slowed speed to maintain safety and allow for line management, unsteadiness     Ambulation:   Distance: 5 feet with  gait belt and 2 wheeled walker  Assistance Level:  Minimal Assistance (Min), second person for safety  Reason for Assistance:  safety due to line management and impulsiveness, weakness, unsteadiness   Gait Mechanics:  shuffling      Stairs:   Not addressed this session    Balance:  Static sitting: Supervision (Supv)  Dynamic sitting: Supervision (Supv)  Static standing:  Minimal Assistance (Min)  Dynamic standing: Minimal Assistance (Min)  use of 2WW    Exercises:  Not addressed this session     OBSERVATION:   Vital Signs:   Vitals stable throughout therapy session.    Cognition:   Alert and Oriented x4 and Confused    ROM  (degrees):  within functional limits    Strength (in available AROM):   within functional limits    Neurological:   intact except for reports of peripheral neuropathy    Activity Tolerance:   limited by impulsiveness     EDUCATION:    Learner(s): patient  Education topics covered this session: Bed Mobility, Transfers, Gait, Safety Awareness, Pain Management Techniques, Therapy Plan of Care, Energy Conservation and Home Safety  Please refer to patient education record for further information regarding patient's learning assessment.       PLAN OF CARE:    PT Frequency: 3 days/week  Duration: LOS  Treatment/Interventions: Functional transfer training, Strengthening, Endurance training, Cognitive reorientation, Patient/Family training, Equipment eval/education, Bed mobility, Gait training, Stairs retraining, Safety Education, Neuromuscular re-education    GOALS:     Review Date: 7/2/2020  1. Patient will complete bed mobility with Supervision (Supv).  2. Patient will complete sit to/from stand with Supervision (Supv) using 2 wheeled walker.  3. Patient will complete stand pivot transfer with Supervision (Supv) using 2 wheeled walker.  4. Patient will ambulate 50 feet with Supervision (Supv) using 2 wheeled walker.  5. Patient will negotiate 6 stairs with Supervision (Supv) using least restrictive device.    Rehab potential is limited due to the following positive factors: recent onset; and is impacted by the following negative factors: age, cognition, communication, motivation level, activity tolerance, co-morbidities      The plan of care and goals were established with the patient and he is in agreement.      EVALUATION CODE JUSTIFICATION:       Eval Code:  $ PT Eval: Low Complexity: 1 Procedure  Problems/Co-morbidities:   Patient Active Problem List   Diagnosis   • Ulcers of both lower legs with fat layer exposed (CMS/HCC)   • Leg swelling   • Atherosclerosis of coronary artery   • Benign prostatic hyperplasia with  patient presents with chest pain. PMH includes CAD s/p stent x 1 (2010), T2DM, HTN, and Parkinson's disease urinary obstruction   • Chronic obstructive pulmonary disease (CMS/HCC)   • Hypercholesterolemia   • Hypertension   • Malignant neoplasm of prostate (CMS/HCC)   • Malignant neoplasm of skin   • Myocardial infarction (CMS/HCC)   • Peripheral venous insufficiency   • Type 2 diabetes mellitus (CMS/HCC)   • Hydronephrosis of right kidney   • CKD (chronic kidney disease) stage 3, GFR 30-59 ml/min (CMS/HCC)   • Rib injury   • Severe protein-calorie malnutrition (CMS/HCC)   • Recurrent falls   • Iron deficiency anemia secondary to inadequate dietary iron intake   • History of kidney stones   • Personal history of prostate cancer   • Unintended weight loss   • Solitary right kidney   • Pressure injury of coccygeal region, stage 2 (CMS/HCC)   • Generalized weakness   • Failure to thrive in adult   • Hypoxia   • Urinary tract infection associated with indwelling urethral catheter (CMS/HCC)   • Leukocytosis   • Ulcers of both lower extremities, limited to breakdown of skin (CMS/HCC)   • Sepsis (CMS/HCC)     Clinical Presentation: Stable and/or uncomplicated characteristics  Predicted patient presentation: Low (stable) - Patient comorbidities and complexities, as defined above, will have little effect on progress for prescribed plan of care.  Clinical decision making: Low - patient has no personal factors/comorbidities that impact the plan of care; addressing 1-2 measures from the evaluation; stable and/or uncomplicated characteristics consistent with low clinical decision making complexity     PT Time Spent: 33 minutes (06/25/20 7997)

## 2020-07-10 NOTE — PROGRESS NOTE ADULT - PROBLEM SELECTOR PLAN 1
Will continue insulin drip for now. Will continue monitoring FS, log, will Follow up.  Will d/c and start on basal bolus insulin when eating full meals. RL 1000ml

## 2020-08-04 ENCOUNTER — APPOINTMENT (OUTPATIENT)
Dept: WOUND CARE | Facility: CLINIC | Age: 70
End: 2020-08-04
Payer: MEDICARE

## 2020-08-04 ENCOUNTER — NON-APPOINTMENT (OUTPATIENT)
Age: 70
End: 2020-08-04

## 2020-08-04 VITALS — DIASTOLIC BLOOD PRESSURE: 80 MMHG | HEART RATE: 65 BPM | SYSTOLIC BLOOD PRESSURE: 186 MMHG | TEMPERATURE: 98 F

## 2020-08-04 DIAGNOSIS — I10 ESSENTIAL (PRIMARY) HYPERTENSION: ICD-10-CM

## 2020-08-04 DIAGNOSIS — Z87.891 PERSONAL HISTORY OF NICOTINE DEPENDENCE: ICD-10-CM

## 2020-08-04 DIAGNOSIS — Z95.1 PRESENCE OF AORTOCORONARY BYPASS GRAFT: ICD-10-CM

## 2020-08-04 DIAGNOSIS — L97.519 NON-PRESSURE CHRONIC ULCER OF OTHER PART OF RIGHT FOOT WITH UNSPECIFIED SEVERITY: ICD-10-CM

## 2020-08-04 DIAGNOSIS — I25.10 ATHEROSCLEROTIC HEART DISEASE OF NATIVE CORONARY ARTERY W/OUT ANGINA PECTORIS: ICD-10-CM

## 2020-08-04 DIAGNOSIS — B36.9 SUPERFICIAL MYCOSIS, UNSPECIFIED: ICD-10-CM

## 2020-08-04 PROCEDURE — 99203 OFFICE O/P NEW LOW 30 MIN: CPT

## 2020-08-04 RX ORDER — INSULIN DEGLUDEC INJECTION 100 U/ML
100 INJECTION, SOLUTION SUBCUTANEOUS
Refills: 0 | Status: ACTIVE | COMMUNITY

## 2020-08-04 RX ORDER — LEVODOPA AND CARBIDOPA 95; 23.75 MG/1; MG/1
23.75-95 CAPSULE, EXTENDED RELEASE ORAL 3 TIMES DAILY
Qty: 360 | Refills: 5 | Status: DISCONTINUED | COMMUNITY
Start: 2019-12-17 | End: 2020-08-04

## 2020-08-04 RX ORDER — NYSTATIN 100000 [USP'U]/ML
100000 SUSPENSION ORAL 3 TIMES DAILY
Qty: 105 | Refills: 1 | Status: ACTIVE | COMMUNITY
Start: 2020-08-04 | End: 1900-01-01

## 2020-08-04 RX ORDER — TORSEMIDE 10 MG/1
10 TABLET ORAL DAILY
Qty: 60 | Refills: 0 | Status: DISCONTINUED | COMMUNITY
Start: 2019-10-16 | End: 2020-08-04

## 2020-08-04 RX ORDER — LEVOTHYROXINE SODIUM 0.05 MG/1
50 TABLET ORAL DAILY
Qty: 60 | Refills: 0 | Status: DISCONTINUED | COMMUNITY
Start: 2019-10-16 | End: 2020-08-04

## 2020-08-13 ENCOUNTER — APPOINTMENT (OUTPATIENT)
Dept: WOUND CARE | Facility: CLINIC | Age: 70
End: 2020-08-13
Payer: MEDICARE

## 2020-08-13 DIAGNOSIS — L30.8 OTHER SPECIFIED DERMATITIS: ICD-10-CM

## 2020-08-13 DIAGNOSIS — F41.9 ANXIETY DISORDER, UNSPECIFIED: ICD-10-CM

## 2020-08-13 PROCEDURE — 99213 OFFICE O/P EST LOW 20 MIN: CPT | Mod: 95

## 2020-08-18 PROBLEM — L30.8 DERMATITIS ASSOCIATED WITH MOISTURE: Status: ACTIVE | Noted: 2020-08-04

## 2020-08-18 PROBLEM — F41.9 ANXIETY: Status: ACTIVE | Noted: 2017-06-29

## 2020-08-18 NOTE — REASON FOR VISIT
[Consultation] : a consultation visit [FreeTextEntry1] : foot and back ulcers [Formal Caregiver] : formal caregiver

## 2020-08-18 NOTE — PHYSICAL EXAM
[JVD] : no jugular venous distention  [Normal Breath Sounds] : Normal breath sounds [Normal Rate and Rhythm] : normal rate and rhythm [Ankle Swelling (On Exam)] : present [2+] : left 2+ [Ankle Swelling Bilaterally] : bilaterally  [Alert] : alert [Abdomen Tenderness] : ~T ~M No abdominal tenderness [Skin Ulcer] : ulcer [de-identified] : nad [Calm] : calm [FreeTextEntry1] : left ankle 31.5, right 30 [de-identified] : tremor right side, eomi [de-identified] : bulge on left no hernia non tender [de-identified] : weara diapers [de-identified] : rom weak and tremoulous right leg more than left [Please See PDF for [employer] Analytics] : Please See PDF for [employer]. [FreeTextEntry7] : sacral buttock [de-identified] : right blister dorsal foot

## 2020-08-18 NOTE — ASSESSMENT
[FreeTextEntry1] :  70 yr old with buttock/sacral area and right foot ulcer- improved\par moisture dermatitis and lymphedema, continue mbarrier\par needs new doppler eval, swollen, no homans\par  cabgx5   on meds for parkinsons, dm htn\par datacomplexity- mod-  lab, xr, old rec, test resultsreview,, visualize image  previous\par risk- no debridement needed- surgery\par right foot- cavilon with compression, needs both legs aced-\par md- jose alfredo/ nystatin po\par fup 2 weeks\par needs doppler e froilan\par

## 2020-08-18 NOTE — HISTORY OF PRESENT ILLNESS
[FreeTextEntry1] : Mr. ORLIN HARDIN   presents to the office with a wound for several months duration. doing better , right foot- cavilon with compression, needs both legs aced-\par md- jose alfredo/ nystatin po\par  The wound is located on  the allevyn foam dressing and medihoney .  \par The patient has complaints of .  buttock issue, s/p rehab postop\par  The patient has been dressing the wound withfoam\par The patient denies fevers or chills.  The patient has localized pain to the wound upon dressing changes.  The patient has no other complaints or associated symptoms.  HbA1c \par h/o cabgx5   on meds for parkinsons, dm htn\par \par

## 2020-08-18 NOTE — REVIEW OF SYSTEMS
[Skin Wound] : skin wound [Limb Swelling] : limb swelling [Limb Weakness] : limb weakness [Negative] : Endocrine [FreeTextEntry2] : swollen legs

## 2020-08-20 ENCOUNTER — NON-APPOINTMENT (OUTPATIENT)
Age: 70
End: 2020-08-20

## 2020-09-10 NOTE — HISTORY OF PRESENT ILLNESS
[FreeTextEntry1] : Mr. ORLIN HARDIN   presents to the office with a wound for several months duration. \par  The wound is located on  the allevyn foam dressing and medihoney .  \par The patient has complaints of .  buttock issue, s/p rehab postop\par  The patient has been dressing the wound withfoam\par The patient denies fevers or chills.  The patient has localized pain to the wound upon dressing changes.  The patient has no other complaints or associated symptoms.  HbA1c \par h/o cabgx5   on meds for parkinsons, dm htn\par \par

## 2020-09-10 NOTE — PHYSICAL EXAM
[Normal Breath Sounds] : Normal breath sounds [Normal Rate and Rhythm] : normal rate and rhythm [2+] : left 2+ [Ankle Swelling Bilaterally] : bilaterally  [Ankle Swelling (On Exam)] : present [Alert] : alert [Skin Ulcer] : ulcer [Calm] : calm [Please See PDF for [employer] Analytics] : Please See PDF for [employer]. [JVD] : no jugular venous distention  [Abdomen Tenderness] : ~T ~M No abdominal tenderness [de-identified] : nad [de-identified] : tremor right side, eomi [FreeTextEntry1] : left ankle 31.5, right 30 [de-identified] : bulge on left no hernia non tender [de-identified] : weara diapers [de-identified] : rom weak and tremoulous right leg more than left [de-identified] : right blister dorsal foot  [FreeTextEntry7] : sacral buttock

## 2020-09-10 NOTE — REASON FOR VISIT
[Consultation] : a consultation visit [Formal Caregiver] : formal caregiver [FreeTextEntry1] : foot and back ulcers

## 2020-09-10 NOTE — PHYSICAL EXAM
[Normal Breath Sounds] : Normal breath sounds [Normal Rate and Rhythm] : normal rate and rhythm [Ankle Swelling Bilaterally] : bilaterally  [Ankle Swelling (On Exam)] : present [2+] : left 2+ [Calm] : calm [Alert] : alert [Skin Ulcer] : ulcer [Please See PDF for [employer] Analytics] : Please See PDF for [employer]. [JVD] : no jugular venous distention  [Abdomen Tenderness] : ~T ~M No abdominal tenderness [de-identified] : nad [de-identified] : tremor right side, eomi [FreeTextEntry1] : left ankle 31.5, right 30 [de-identified] : bulge on left no hernia non tender [de-identified] : weara diapers [de-identified] : rom weak and tremoulous right leg more than left [de-identified] : right blister dorsal foot  [FreeTextEntry7] : sacral buttock

## 2020-09-10 NOTE — REVIEW OF SYSTEMS
[Limb Swelling] : limb swelling [Limb Weakness] : limb weakness [Skin Wound] : skin wound [Negative] : Heme/Lymph [FreeTextEntry2] : swollen legs

## 2020-09-10 NOTE — ASSESSMENT
[FreeTextEntry1] :  70 yr old with buttock/sacral area and right foot ulcer\par moisture dermatitis and lymphedema\par needs new doppler eval, swollen, no homans\par  cabgx5   on meds for parkinsons, dm htn\par datacomplexity- mod-  lab, xr, old rec, test resultsreview,, visualize image  previous\par risk- no debridement needed- surgery\par right foot- cavilon with compression, needs both legs aced-\par md- jose alfredo/ nystatin po\par fup 2 weeks\par needs doppler e froilan\par

## 2020-09-29 NOTE — PROGRESS NOTE ADULT - PROBLEM SELECTOR PLAN 4
Children under the age of 2 years will be restrained in a rear facing child safety seat.   If you have an active [facility] account, please look for your well child questionnaire to come to your [facility] account before your next well child visit.    Well-Child Checkup: 15 Months    At the 15-month checkup, the healthcare provider will examine the child and ask how its going at home. This sheet describes some of what you can expect.  Development and milestones  The healthcare provider will ask questions about your child. He or she will observe your toddler to get an idea of the childs development. By this visit, your child is likely doing some of the following:  · Walking  · Squatting down and standing back up  · Pointing at items he or she wants  · Copying some of your actions (such as holding a phone to his or her ear, or pointing with a remote control)  · Throwing or kicking a ball  · Starting to let you know his or her needs  · Saying 1 or 2 words (besides Mama and Gregory)  Feeding tips  At 15 months of age, its normal for a child to eat 3 meals and a few snacks each day. If your child doesnt want to eat, thats OK. Provide food at mealtime, and your child will eat if and when he or she is hungry. Do not force the child to eat. To help your child eat well:  · Keep serving a variety of finger foods at meals. Be persistent with offering new foods. It often takes several tries before a child starts to like a new taste.  · If your child is hungry between meals, offer healthy foods. Cut-up vegetables and fruit, unsweetened cereal, and crackers are good choices. Save snack foods, such as chips or cookies, for special occasions.  · Your child should continue to drink whole milk every day. But, he or she should get most calories from healthy, solid foods.  · Besides drinking milk, water is best. Limit fruit juice. You can add water to 100% fruit juice and give it to your toddler in a cup. Dont give your toddler  soda.  · Serve drinks in a cup, not a bottle.  · Dont let your child walk around with food or a bottle. This is a choking risk and can also lead to overeating as your child gets older.  · Ask the healthcare provider if your child needs a fluoride supplement.  Hygiene tips  · Brush your childs teeth at least once a day. Twice a day is ideal (such as after breakfast and before bed). Use a small amount of fluoride toothpaste (no larger than a grain of rice) and a babys toothbrush with soft bristles.  · Ask the healthcare provider when your child should have his or her first dental visit. Most pediatric dentists recommend that the first dental visit happen within 6 months after the first tooth visibly erupts above the gums, but no later than the child's first birthday.  Sleeping tips  Most children sleep around 10 to 12 hours at night at this age. If your child sleeps more or less than this but seems healthy, it is not a concern. At 15 months of age, many children are down to one nap. Whatever works best for your child and your schedule is fine. To help your child sleep:  · Follow a bedtime routine each night, such as brushing teeth followed by reading a book. Try to stick to the same bedtime each night.  · Do not put your child to bed with anything to drink.  · Make sure the crib mattress is on the lowest setting. This helps keep your child from pulling up and climbing or falling out of the crib. If your child is still able to climb out of the crib, use a crib tent, or put the mattress on the floor, or switch to a toddler bed.  · If getting the child to sleep through the night is a problem, ask the healthcare provider for tips.  Safety tips  Recommendations for keeping your toddler safe include the following:   · At this age, children are very curious. They are likely to get into items that can be dangerous. Keep latches on cabinets and make sure products like cleansers and medicines are out of reach.  · Protect  your toddler from falls with sturdy screens on windows and mohamud at the tops and bottoms of staircases. Supervise your child on the stairs.  · If you have a swimming pool, it should be fenced. Mohamud or doors leading to the pool should be closed and locked.  · Watch out for items that are small enough to choke on. As a rule, an item small enough to fit inside a toilet paper tube can cause a child to choke.  · In the car, always put the child in a car seat in the back seat. Even if your child weighs more than 20 pounds, he or she should still face backward. In fact, it's safest to face backward until age 2. Ask the healthcare provider if you have questions.  · Teach your child to be gentle and cautious with dogs, cats, and other animals. Always supervise the child around animals, even familiar family pets.  · Keep this Poison Control phone number in an easy-to-see place, such as on the refrigerator: [phone].  Vaccines  Based on recommendations from the CDC, at this visit your child may receive the following vaccines:  · Diphtheria, tetanus, and pertussis  · Haemophilus influenzae type b  · Hepatitis A  · Hepatitis B  · Influenza (flu)  · Measles, mumps, and rubella  · Pneumococcus  · Polio  · Varicella (chickenpox)  Teaching good behavior and setting limits  Learning to follow the rules is an important part of growing up. Your toddler may have started to act out by doing things like throwing food or toys. Curiosity may cause your toddler to do something dangerous, such as touching a hot stove. To encourage good behavior and keep your toddler safe, you need to start setting limits and enforcing rules. Here are some tips:  · Teach your child whats OK to do and what isnt. Your child needs to learn to stop what he or she is doing when you say to. Be firm and patient. It will take time for your child to learn the rules. Try not to get frustrated.  · Be consistent with rules and limits. A child cant learn whats  "expected if the rules keep changing.  · Ask questions that help your child make choices, such as, Do you want to wear your sweater or your jacket? Never ask a "yes" or "no" question unless it is OK to answer "no". For example, dont ask, Do you want to take a bath? Simply say, Its time for your bath. Or offer a choice like, Do you want your bath before or after reading a book?  · Never let your childs reaction make you change your mind about a limit that you have set. Rewarding a temper tantrum will only teach your child to throw a tantrum to get what he or she wants.  · If you have questions about setting limits or your childs behavior, talk to the healthcare provider.      Next checkup at: _______________________________     PARENT NOTES:  Date Last Reviewed: 12/1/2016  © 3056-6152 [employer]. [address] [place] [address], [place] [address]. All rights reserved. This information is not intended as a substitute for professional medical care. Always follow your healthcare professional's instructions.      " Renal following   Monitor renal function-stable BUN/Cr  Javier d/c'd 10/9-voiding well post-removal  Torsemide 10mg restarted on 10/14 per Dr. Frias's recommendation.

## 2020-10-05 ENCOUNTER — RX RENEWAL (OUTPATIENT)
Age: 70
End: 2020-10-05

## 2020-10-19 ENCOUNTER — RX RENEWAL (OUTPATIENT)
Age: 70
End: 2020-10-19

## 2020-10-19 RX ORDER — CARBIDOPA AND LEVODOPA 25; 100 MG/1; MG/1
25-100 TABLET, EXTENDED RELEASE ORAL
Qty: 180 | Refills: 5 | Status: ACTIVE | COMMUNITY
Start: 2019-02-05 | End: 1900-01-01

## 2020-11-11 ENCOUNTER — RX RENEWAL (OUTPATIENT)
Age: 70
End: 2020-11-11

## 2020-12-15 ENCOUNTER — APPOINTMENT (OUTPATIENT)
Dept: NEUROLOGY | Facility: CLINIC | Age: 70
End: 2020-12-15
Payer: MEDICARE

## 2020-12-15 VITALS
DIASTOLIC BLOOD PRESSURE: 76 MMHG | WEIGHT: 250 LBS | HEIGHT: 71 IN | BODY MASS INDEX: 35 KG/M2 | SYSTOLIC BLOOD PRESSURE: 184 MMHG | HEART RATE: 66 BPM

## 2020-12-15 PROCEDURE — 99214 OFFICE O/P EST MOD 30 MIN: CPT

## 2020-12-15 RX ORDER — TRIHEXYPHENIDYL HYDROCHLORIDE 2 MG/1
2 TABLET ORAL
Qty: 270 | Refills: 3 | Status: ACTIVE | COMMUNITY
Start: 2017-02-17 | End: 1900-01-01

## 2020-12-15 NOTE — HISTORY OF PRESENT ILLNESS
[FreeTextEntry1] : 71 yo man with PD since 2012. \par \par 1. In December 2019, had AMI, requiring CABGx5. Was in rehab, now home. On metoprolol, ASA, lasix. Also on levothyroxine and insulin\par 2.  More tremor. Increased sinemet  to 2.5 - 2.5 - 2 tablet (3x/day, every 6 hours), no difference in tremor. , and artane 2mg 3x/day (tried to taper off; tremor got worse), which worked well overall. Gets very tired about an hour after taking the medication. Did not try Sinememt ER. Feels good in the morning. No falls, uses cane. No clear motor fluctuations, but different days can be different. Tried rytary, too expensive. No hallucinations.\par 3. Some forgetfulness. No hallucinations. Anxiety not an issue according to him, but depression may be (he denies, he says he is "annoyed"). He does have apathy. He was on Paxil in the past (started in rehab) but stopped it because he did not think he needed it. Gets flustered easily. Sleeps fitfully, no RBD. Manageable constipation.  \par 4. Was following with neurosurgery for a meningioma, MRI in October 2018 showed stable right meningioma. Repeat MRI 2/7/20 also stable.

## 2020-12-15 NOTE — PHYSICAL EXAM
[Person] : oriented to person [Place] : oriented to place [Time] : oriented to time [Short Term Intact] : short term memory intact [Registration Intact] : recent registration memory intact [Motor Handedness Right-Handed] : the patient is right hand dominant [FreeTextEntry4] : 2/3 delayed recall, 3/3 with hints [FreeTextEntry1] : Improved masked face, decreased blink, voice ok. EOMI, no SWJ. Tone increased in neck, and left>right arm, and left leg, worse than last visit. Parkinsonian tremor in both arms and legs today\par RAHM and foot slowed on left>right (worse on left). Able to get up from chair without arms. Gait with improved but shortened stride, multistep turn. Pull test negative.\par

## 2020-12-15 NOTE — DISCUSSION/SUMMARY
[FreeTextEntry1] : 1) PD since 2012, much improved on sinemet and artane, buht worse not than last year, and significant apathy\par 2) Anxiety and depression\par 3) Meningioma\par \par 1. Continue sinemet and artane. Tremor is still bothering him. Continue artane, trial of 1.5 pills (3 mg) tid .\par 2. For anxiety, monitor, would recommend medications, he wants to wait. \par 3. PT and exercise\par 4. Meningioma - stable, repeat in 2021\par 5. Walker with seat as needed\par \par We discussed the above impression, plan and recommendations during the visit. Counseling represented more then 50% of the 30 minute visit time\par

## 2021-01-01 ENCOUNTER — APPOINTMENT (OUTPATIENT)
Dept: SPEECH THERAPY | Facility: CLINIC | Age: 71
End: 2021-01-01
Payer: MEDICARE

## 2021-01-01 ENCOUNTER — APPOINTMENT (OUTPATIENT)
Dept: NEUROLOGY | Facility: CLINIC | Age: 71
End: 2021-01-01
Payer: MEDICARE

## 2021-01-01 ENCOUNTER — RX RENEWAL (OUTPATIENT)
Age: 71
End: 2021-01-01

## 2021-01-01 ENCOUNTER — TRANSCRIPTION ENCOUNTER (OUTPATIENT)
Age: 71
End: 2021-01-01

## 2021-01-01 ENCOUNTER — INPATIENT (INPATIENT)
Facility: HOSPITAL | Age: 71
LOS: 130 days | DRG: 268 | End: 2022-02-28
Attending: INTERNAL MEDICINE | Admitting: INTERNAL MEDICINE
Payer: MEDICARE

## 2021-01-01 ENCOUNTER — APPOINTMENT (OUTPATIENT)
Dept: NEUROLOGY | Facility: CLINIC | Age: 71
End: 2021-01-01

## 2021-01-01 VITALS
SYSTOLIC BLOOD PRESSURE: 138 MMHG | TEMPERATURE: 98 F | HEART RATE: 111 BPM | HEIGHT: 71 IN | WEIGHT: 235.01 LBS | RESPIRATION RATE: 24 BRPM | DIASTOLIC BLOOD PRESSURE: 86 MMHG | OXYGEN SATURATION: 99 %

## 2021-01-01 DIAGNOSIS — N17.9 ACUTE KIDNEY FAILURE, UNSPECIFIED: ICD-10-CM

## 2021-01-01 DIAGNOSIS — Z96.641 PRESENCE OF RIGHT ARTIFICIAL HIP JOINT: Chronic | ICD-10-CM

## 2021-01-01 DIAGNOSIS — D64.9 ANEMIA, UNSPECIFIED: ICD-10-CM

## 2021-01-01 DIAGNOSIS — Z51.5 ENCOUNTER FOR PALLIATIVE CARE: ICD-10-CM

## 2021-01-01 DIAGNOSIS — D32.9 BENIGN NEOPLASM OF MENINGES, UNSPECIFIED: ICD-10-CM

## 2021-01-01 DIAGNOSIS — I50.9 HEART FAILURE, UNSPECIFIED: ICD-10-CM

## 2021-01-01 DIAGNOSIS — E11.9 TYPE 2 DIABETES MELLITUS WITHOUT COMPLICATIONS: ICD-10-CM

## 2021-01-01 DIAGNOSIS — Z95.9 PRESENCE OF CARDIAC AND VASCULAR IMPLANT AND GRAFT, UNSPECIFIED: Chronic | ICD-10-CM

## 2021-01-01 DIAGNOSIS — G20 PARKINSON'S DISEASE: ICD-10-CM

## 2021-01-01 DIAGNOSIS — F32.9 ANXIETY DISORDER, UNSPECIFIED: ICD-10-CM

## 2021-01-01 DIAGNOSIS — E03.9 HYPOTHYROIDISM, UNSPECIFIED: ICD-10-CM

## 2021-01-01 DIAGNOSIS — I21.4 NON-ST ELEVATION (NSTEMI) MYOCARDIAL INFARCTION: ICD-10-CM

## 2021-01-01 DIAGNOSIS — I25.10 ATHEROSCLEROTIC HEART DISEASE OF NATIVE CORONARY ARTERY WITHOUT ANGINA PECTORIS: ICD-10-CM

## 2021-01-01 DIAGNOSIS — E83.52 HYPERCALCEMIA: ICD-10-CM

## 2021-01-01 DIAGNOSIS — Z95.1 PRESENCE OF AORTOCORONARY BYPASS GRAFT: Chronic | ICD-10-CM

## 2021-01-01 DIAGNOSIS — F41.9 ANXIETY DISORDER, UNSPECIFIED: Chronic | ICD-10-CM

## 2021-01-01 DIAGNOSIS — I10 ESSENTIAL (PRIMARY) HYPERTENSION: ICD-10-CM

## 2021-01-01 DIAGNOSIS — F32.9 MAJOR DEPRESSIVE DISORDER, SINGLE EPISODE, UNSPECIFIED: Chronic | ICD-10-CM

## 2021-01-01 DIAGNOSIS — F41.9 ANXIETY DISORDER, UNSPECIFIED: ICD-10-CM

## 2021-01-01 DIAGNOSIS — N18.5 CHRONIC KIDNEY DISEASE, STAGE 5: ICD-10-CM

## 2021-01-01 DIAGNOSIS — R52 PAIN, UNSPECIFIED: ICD-10-CM

## 2021-01-01 DIAGNOSIS — R41.0 DISORIENTATION, UNSPECIFIED: ICD-10-CM

## 2021-01-01 DIAGNOSIS — Z71.89 OTHER SPECIFIED COUNSELING: ICD-10-CM

## 2021-01-01 DIAGNOSIS — I50.23 ACUTE ON CHRONIC SYSTOLIC (CONGESTIVE) HEART FAILURE: ICD-10-CM

## 2021-01-01 DIAGNOSIS — I48.92 UNSPECIFIED ATRIAL FLUTTER: ICD-10-CM

## 2021-01-01 LAB
% ALBUMIN: 42.8 % — SIGNIFICANT CHANGE UP
% ALBUMIN: 43.8 % — SIGNIFICANT CHANGE UP
% ALPHA 1: 6.3 % — SIGNIFICANT CHANGE UP
% ALPHA 1: 6.6 % — SIGNIFICANT CHANGE UP
% ALPHA 2: 10.5 % — SIGNIFICANT CHANGE UP
% ALPHA 2: 11.3 % — SIGNIFICANT CHANGE UP
% BETA: 12.2 % — SIGNIFICANT CHANGE UP
% BETA: 12.4 % — SIGNIFICANT CHANGE UP
% GAMMA: 26.9 % — SIGNIFICANT CHANGE UP
% GAMMA: 27.2 % — SIGNIFICANT CHANGE UP
% M SPIKE: 5.3 % — SIGNIFICANT CHANGE UP
% M SPIKE: 7.6 % — SIGNIFICANT CHANGE UP
-  STAPHYLOCOCCUS EPIDERMIDIS: SIGNIFICANT CHANGE UP
A1C WITH ESTIMATED AVERAGE GLUCOSE RESULT: 6.4 % — HIGH (ref 4–5.6)
ALBUMIN SERPL ELPH-MCNC: 2.2 G/DL — LOW (ref 3.3–5)
ALBUMIN SERPL ELPH-MCNC: 2.3 G/DL — LOW (ref 3.3–5)
ALBUMIN SERPL ELPH-MCNC: 2.4 G/DL — LOW (ref 3.3–5)
ALBUMIN SERPL ELPH-MCNC: 2.4 G/DL — LOW (ref 3.3–5)
ALBUMIN SERPL ELPH-MCNC: 2.5 G/DL — LOW (ref 3.3–5)
ALBUMIN SERPL ELPH-MCNC: 2.6 G/DL — LOW (ref 3.3–5)
ALBUMIN SERPL ELPH-MCNC: 2.7 G/DL — LOW (ref 3.3–5)
ALBUMIN SERPL ELPH-MCNC: 2.8 G/DL — LOW (ref 3.3–5)
ALBUMIN SERPL ELPH-MCNC: 2.8 G/DL — LOW (ref 3.6–5.5)
ALBUMIN SERPL ELPH-MCNC: 2.9 G/DL — LOW (ref 3.3–5)
ALBUMIN SERPL ELPH-MCNC: 3 G/DL — LOW (ref 3.3–5)
ALBUMIN SERPL ELPH-MCNC: 3 G/DL — LOW (ref 3.6–5.5)
ALBUMIN SERPL ELPH-MCNC: 3.2 G/DL — LOW (ref 3.3–5)
ALBUMIN SERPL ELPH-MCNC: 3.2 G/DL — LOW (ref 3.3–5)
ALBUMIN SERPL ELPH-MCNC: 3.3 G/DL — SIGNIFICANT CHANGE UP (ref 3.3–5)
ALBUMIN/GLOB SERPL ELPH: 0.8 RATIO — SIGNIFICANT CHANGE UP
ALBUMIN/GLOB SERPL ELPH: 0.8 RATIO — SIGNIFICANT CHANGE UP
ALP SERPL-CCNC: 103 U/L — SIGNIFICANT CHANGE UP (ref 40–120)
ALP SERPL-CCNC: 108 U/L — SIGNIFICANT CHANGE UP (ref 40–120)
ALP SERPL-CCNC: 113 U/L — SIGNIFICANT CHANGE UP (ref 40–120)
ALP SERPL-CCNC: 118 U/L — SIGNIFICANT CHANGE UP (ref 40–120)
ALP SERPL-CCNC: 120 U/L — SIGNIFICANT CHANGE UP (ref 40–120)
ALP SERPL-CCNC: 129 U/L — HIGH (ref 40–120)
ALP SERPL-CCNC: 136 U/L — HIGH (ref 40–120)
ALP SERPL-CCNC: 139 U/L — HIGH (ref 40–120)
ALP SERPL-CCNC: 141 U/L — HIGH (ref 40–120)
ALP SERPL-CCNC: 60 U/L — SIGNIFICANT CHANGE UP (ref 40–120)
ALP SERPL-CCNC: 62 U/L — SIGNIFICANT CHANGE UP (ref 40–120)
ALP SERPL-CCNC: 63 U/L — SIGNIFICANT CHANGE UP (ref 40–120)
ALP SERPL-CCNC: 64 U/L — SIGNIFICANT CHANGE UP (ref 40–120)
ALP SERPL-CCNC: 65 U/L — SIGNIFICANT CHANGE UP (ref 40–120)
ALP SERPL-CCNC: 66 U/L — SIGNIFICANT CHANGE UP (ref 40–120)
ALP SERPL-CCNC: 72 U/L — SIGNIFICANT CHANGE UP (ref 40–120)
ALP SERPL-CCNC: 73 U/L — SIGNIFICANT CHANGE UP (ref 40–120)
ALP SERPL-CCNC: 78 U/L — SIGNIFICANT CHANGE UP (ref 40–120)
ALP SERPL-CCNC: 82 U/L — SIGNIFICANT CHANGE UP (ref 40–120)
ALP SERPL-CCNC: 83 U/L — SIGNIFICANT CHANGE UP (ref 40–120)
ALP SERPL-CCNC: 83 U/L — SIGNIFICANT CHANGE UP (ref 40–120)
ALP SERPL-CCNC: 86 U/L — SIGNIFICANT CHANGE UP (ref 40–120)
ALP SERPL-CCNC: 88 U/L — SIGNIFICANT CHANGE UP (ref 40–120)
ALP SERPL-CCNC: 91 U/L — SIGNIFICANT CHANGE UP (ref 40–120)
ALP SERPL-CCNC: 92 U/L — SIGNIFICANT CHANGE UP (ref 40–120)
ALP SERPL-CCNC: 98 U/L — SIGNIFICANT CHANGE UP (ref 40–120)
ALPHA1 GLOB SERPL ELPH-MCNC: 0.4 G/DL — SIGNIFICANT CHANGE UP (ref 0.1–0.4)
ALPHA1 GLOB SERPL ELPH-MCNC: 0.4 G/DL — SIGNIFICANT CHANGE UP (ref 0.1–0.4)
ALPHA2 GLOB SERPL ELPH-MCNC: 0.7 G/DL — SIGNIFICANT CHANGE UP (ref 0.5–1)
ALPHA2 GLOB SERPL ELPH-MCNC: 0.7 G/DL — SIGNIFICANT CHANGE UP (ref 0.5–1)
ALT FLD-CCNC: 10 U/L — SIGNIFICANT CHANGE UP (ref 10–45)
ALT FLD-CCNC: 5 U/L — LOW (ref 10–45)
ALT FLD-CCNC: 6 U/L — LOW (ref 10–45)
ALT FLD-CCNC: 7 U/L — LOW (ref 10–45)
ALT FLD-CCNC: 8 U/L — LOW (ref 10–45)
ALT FLD-CCNC: 8 U/L — LOW (ref 10–45)
ALT FLD-CCNC: <5 U/L — LOW (ref 10–45)
AMMONIA BLD-MCNC: 17 UMOL/L — SIGNIFICANT CHANGE UP (ref 11–55)
ANION GAP SERPL CALC-SCNC: 12 MMOL/L — SIGNIFICANT CHANGE UP (ref 5–17)
ANION GAP SERPL CALC-SCNC: 12 MMOL/L — SIGNIFICANT CHANGE UP (ref 5–17)
ANION GAP SERPL CALC-SCNC: 13 MMOL/L — SIGNIFICANT CHANGE UP (ref 5–17)
ANION GAP SERPL CALC-SCNC: 14 MMOL/L — SIGNIFICANT CHANGE UP (ref 5–17)
ANION GAP SERPL CALC-SCNC: 15 MMOL/L — SIGNIFICANT CHANGE UP (ref 5–17)
ANION GAP SERPL CALC-SCNC: 16 MMOL/L — SIGNIFICANT CHANGE UP (ref 5–17)
ANION GAP SERPL CALC-SCNC: 17 MMOL/L — SIGNIFICANT CHANGE UP (ref 5–17)
ANION GAP SERPL CALC-SCNC: 18 MMOL/L — HIGH (ref 5–17)
ANION GAP SERPL CALC-SCNC: 19 MMOL/L — HIGH (ref 5–17)
ANION GAP SERPL CALC-SCNC: 20 MMOL/L — HIGH (ref 5–17)
ANISOCYTOSIS BLD QL: SIGNIFICANT CHANGE UP
ANISOCYTOSIS BLD QL: SLIGHT — SIGNIFICANT CHANGE UP
ANISOCYTOSIS BLD QL: SLIGHT — SIGNIFICANT CHANGE UP
APPEARANCE UR: CLEAR — SIGNIFICANT CHANGE UP
APTT 50/50 2HOUR INCUB: 52 SEC — HIGH (ref 27.5–36.5)
APTT 50/50 MIX COMMENT: SIGNIFICANT CHANGE UP
APTT BLD: 38.1 SEC — HIGH (ref 27.5–35.5)
APTT BLD: 42.5 SEC — HIGH (ref 27.5–36.5)
APTT BLD: 43.7 SEC — HIGH (ref 27.5–35.5)
APTT BLD: 49.7 SEC — HIGH (ref 27.5–35.5)
APTT BLD: 49.9 SEC — HIGH (ref 27.5–35.5)
APTT BLD: 50.4 SEC — HIGH (ref 27.5–35.5)
APTT BLD: 50.9 SEC — HIGH (ref 27.5–35.5)
APTT BLD: 51.5 SEC — HIGH (ref 27.5–35.5)
APTT BLD: 51.7 SEC — HIGH (ref 27.5–35.5)
APTT BLD: 51.7 SEC — HIGH (ref 27.5–35.5)
APTT BLD: 53.2 SEC — HIGH (ref 27.5–35.5)
APTT BLD: 53.9 SEC — HIGH (ref 27.5–35.5)
APTT BLD: 54.5 SEC — HIGH (ref 27.5–35.5)
APTT BLD: 54.8 SEC — HIGH (ref 27.5–35.5)
APTT BLD: 55.5 SEC — HIGH (ref 27.5–35.5)
APTT BLD: 56.1 SEC — HIGH (ref 27.5–35.5)
APTT BLD: 58.4 SEC — HIGH (ref 27.5–35.5)
APTT BLD: 58.4 SEC — HIGH (ref 27.5–35.5)
APTT BLD: 60.6 SEC — HIGH (ref 27.5–35.5)
APTT BLD: 79 SEC — HIGH (ref 27.5–35.5)
APTT BLD: 80.8 SEC — HIGH (ref 27.5–35.5)
APTT BLD: >200 SEC — CRITICAL HIGH (ref 27.5–35.5)
AST SERPL-CCNC: 10 U/L — SIGNIFICANT CHANGE UP (ref 10–40)
AST SERPL-CCNC: 12 U/L — SIGNIFICANT CHANGE UP (ref 10–40)
AST SERPL-CCNC: 14 U/L — SIGNIFICANT CHANGE UP (ref 10–40)
AST SERPL-CCNC: 21 U/L — SIGNIFICANT CHANGE UP (ref 10–40)
AST SERPL-CCNC: 21 U/L — SIGNIFICANT CHANGE UP (ref 10–40)
AST SERPL-CCNC: 23 U/L — SIGNIFICANT CHANGE UP (ref 10–40)
AST SERPL-CCNC: 25 U/L — SIGNIFICANT CHANGE UP (ref 10–40)
AST SERPL-CCNC: 26 U/L — SIGNIFICANT CHANGE UP (ref 10–40)
AST SERPL-CCNC: 28 U/L — SIGNIFICANT CHANGE UP (ref 10–40)
AST SERPL-CCNC: 30 U/L — SIGNIFICANT CHANGE UP (ref 10–40)
AST SERPL-CCNC: 30 U/L — SIGNIFICANT CHANGE UP (ref 10–40)
AST SERPL-CCNC: 33 U/L — SIGNIFICANT CHANGE UP (ref 10–40)
AST SERPL-CCNC: 34 U/L — SIGNIFICANT CHANGE UP (ref 10–40)
AST SERPL-CCNC: 35 U/L — SIGNIFICANT CHANGE UP (ref 10–40)
AST SERPL-CCNC: 42 U/L — HIGH (ref 10–40)
AST SERPL-CCNC: 43 U/L — HIGH (ref 10–40)
AST SERPL-CCNC: 50 U/L — HIGH (ref 10–40)
AST SERPL-CCNC: 6 U/L — LOW (ref 10–40)
AST SERPL-CCNC: 6 U/L — LOW (ref 10–40)
AST SERPL-CCNC: 7 U/L — LOW (ref 10–40)
AST SERPL-CCNC: 7 U/L — LOW (ref 10–40)
AST SERPL-CCNC: 9 U/L — LOW (ref 10–40)
AST SERPL-CCNC: 9 U/L — LOW (ref 10–40)
AST SERPL-CCNC: 94 U/L — HIGH (ref 10–40)
B-GLOBULIN SERPL ELPH-MCNC: 0.8 G/DL — SIGNIFICANT CHANGE UP (ref 0.5–1)
B-GLOBULIN SERPL ELPH-MCNC: 0.8 G/DL — SIGNIFICANT CHANGE UP (ref 0.5–1)
BACTERIA # UR AUTO: NEGATIVE — SIGNIFICANT CHANGE UP
BASE EXCESS BLDV CALC-SCNC: -0.6 MMOL/L — SIGNIFICANT CHANGE UP (ref -2–2)
BASE EXCESS BLDV CALC-SCNC: -1.4 MMOL/L — SIGNIFICANT CHANGE UP (ref -2–2)
BASE EXCESS BLDV CALC-SCNC: -1.7 MMOL/L — SIGNIFICANT CHANGE UP (ref -2–2)
BASE EXCESS BLDV CALC-SCNC: 1.7 MMOL/L — SIGNIFICANT CHANGE UP (ref -2–2)
BASE EXCESS BLDV CALC-SCNC: 3.1 MMOL/L — HIGH (ref -2–2)
BASE EXCESS BLDV CALC-SCNC: 3.7 MMOL/L — HIGH (ref -2–2)
BASOPHILS # BLD AUTO: 0 K/UL — SIGNIFICANT CHANGE UP (ref 0–0.2)
BASOPHILS # BLD AUTO: 0.02 K/UL — SIGNIFICANT CHANGE UP (ref 0–0.2)
BASOPHILS # BLD AUTO: 0.03 K/UL — SIGNIFICANT CHANGE UP (ref 0–0.2)
BASOPHILS # BLD AUTO: 0.03 K/UL — SIGNIFICANT CHANGE UP (ref 0–0.2)
BASOPHILS # BLD AUTO: 0.04 K/UL — SIGNIFICANT CHANGE UP (ref 0–0.2)
BASOPHILS # BLD AUTO: 0.04 K/UL — SIGNIFICANT CHANGE UP (ref 0–0.2)
BASOPHILS # BLD AUTO: 0.06 K/UL — SIGNIFICANT CHANGE UP (ref 0–0.2)
BASOPHILS # BLD AUTO: 0.09 K/UL — SIGNIFICANT CHANGE UP (ref 0–0.2)
BASOPHILS NFR BLD AUTO: 0 % — SIGNIFICANT CHANGE UP (ref 0–2)
BASOPHILS NFR BLD AUTO: 0.1 % — SIGNIFICANT CHANGE UP (ref 0–2)
BASOPHILS NFR BLD AUTO: 0.2 % — SIGNIFICANT CHANGE UP (ref 0–2)
BASOPHILS NFR BLD AUTO: 0.4 % — SIGNIFICANT CHANGE UP (ref 0–2)
BASOPHILS NFR BLD AUTO: 0.5 % — SIGNIFICANT CHANGE UP (ref 0–2)
BASOPHILS NFR BLD AUTO: 0.5 % — SIGNIFICANT CHANGE UP (ref 0–2)
BASOPHILS NFR BLD AUTO: 0.7 % — SIGNIFICANT CHANGE UP (ref 0–2)
BILIRUB DIRECT SERPL-MCNC: 0.4 MG/DL — HIGH (ref 0–0.2)
BILIRUB INDIRECT FLD-MCNC: 0.7 MG/DL — SIGNIFICANT CHANGE UP (ref 0.2–1)
BILIRUB SERPL-MCNC: 0.2 MG/DL — SIGNIFICANT CHANGE UP (ref 0.2–1.2)
BILIRUB SERPL-MCNC: 0.3 MG/DL — SIGNIFICANT CHANGE UP (ref 0.2–1.2)
BILIRUB SERPL-MCNC: 0.5 MG/DL — SIGNIFICANT CHANGE UP (ref 0.2–1.2)
BILIRUB SERPL-MCNC: 0.6 MG/DL — SIGNIFICANT CHANGE UP (ref 0.2–1.2)
BILIRUB SERPL-MCNC: 0.7 MG/DL — SIGNIFICANT CHANGE UP (ref 0.2–1.2)
BILIRUB SERPL-MCNC: 0.8 MG/DL — SIGNIFICANT CHANGE UP (ref 0.2–1.2)
BILIRUB SERPL-MCNC: 0.9 MG/DL — SIGNIFICANT CHANGE UP (ref 0.2–1.2)
BILIRUB SERPL-MCNC: 1 MG/DL — SIGNIFICANT CHANGE UP (ref 0.2–1.2)
BILIRUB SERPL-MCNC: 1.1 MG/DL — SIGNIFICANT CHANGE UP (ref 0.2–1.2)
BILIRUB SERPL-MCNC: 1.3 MG/DL — HIGH (ref 0.2–1.2)
BILIRUB SERPL-MCNC: 1.4 MG/DL — HIGH (ref 0.2–1.2)
BILIRUB SERPL-MCNC: 1.4 MG/DL — HIGH (ref 0.2–1.2)
BILIRUB SERPL-MCNC: 1.5 MG/DL — HIGH (ref 0.2–1.2)
BILIRUB SERPL-MCNC: 1.8 MG/DL — HIGH (ref 0.2–1.2)
BILIRUB SERPL-MCNC: 1.9 MG/DL — HIGH (ref 0.2–1.2)
BILIRUB UR-MCNC: NEGATIVE — SIGNIFICANT CHANGE UP
BLD GP AB SCN SERPL QL: NEGATIVE — SIGNIFICANT CHANGE UP
BUN SERPL-MCNC: 100 MG/DL — HIGH (ref 7–23)
BUN SERPL-MCNC: 101 MG/DL — HIGH (ref 7–23)
BUN SERPL-MCNC: 102 MG/DL — HIGH (ref 7–23)
BUN SERPL-MCNC: 102 MG/DL — HIGH (ref 7–23)
BUN SERPL-MCNC: 103 MG/DL — HIGH (ref 7–23)
BUN SERPL-MCNC: 105 MG/DL — HIGH (ref 7–23)
BUN SERPL-MCNC: 123 MG/DL — HIGH (ref 7–23)
BUN SERPL-MCNC: 132 MG/DL — HIGH (ref 7–23)
BUN SERPL-MCNC: 140 MG/DL — HIGH (ref 7–23)
BUN SERPL-MCNC: 24 MG/DL — HIGH (ref 7–23)
BUN SERPL-MCNC: 27 MG/DL — HIGH (ref 7–23)
BUN SERPL-MCNC: 27 MG/DL — HIGH (ref 7–23)
BUN SERPL-MCNC: 28 MG/DL — HIGH (ref 7–23)
BUN SERPL-MCNC: 29 MG/DL — HIGH (ref 7–23)
BUN SERPL-MCNC: 30 MG/DL — HIGH (ref 7–23)
BUN SERPL-MCNC: 36 MG/DL — HIGH (ref 7–23)
BUN SERPL-MCNC: 37 MG/DL — HIGH (ref 7–23)
BUN SERPL-MCNC: 38 MG/DL — HIGH (ref 7–23)
BUN SERPL-MCNC: 39 MG/DL — HIGH (ref 7–23)
BUN SERPL-MCNC: 42 MG/DL — HIGH (ref 7–23)
BUN SERPL-MCNC: 43 MG/DL — HIGH (ref 7–23)
BUN SERPL-MCNC: 44 MG/DL — HIGH (ref 7–23)
BUN SERPL-MCNC: 45 MG/DL — HIGH (ref 7–23)
BUN SERPL-MCNC: 47 MG/DL — HIGH (ref 7–23)
BUN SERPL-MCNC: 48 MG/DL — HIGH (ref 7–23)
BUN SERPL-MCNC: 49 MG/DL — HIGH (ref 7–23)
BUN SERPL-MCNC: 51 MG/DL — HIGH (ref 7–23)
BUN SERPL-MCNC: 53 MG/DL — HIGH (ref 7–23)
BUN SERPL-MCNC: 53 MG/DL — HIGH (ref 7–23)
BUN SERPL-MCNC: 56 MG/DL — HIGH (ref 7–23)
BUN SERPL-MCNC: 57 MG/DL — HIGH (ref 7–23)
BUN SERPL-MCNC: 58 MG/DL — HIGH (ref 7–23)
BUN SERPL-MCNC: 58 MG/DL — HIGH (ref 7–23)
BUN SERPL-MCNC: 59 MG/DL — HIGH (ref 7–23)
BUN SERPL-MCNC: 60 MG/DL — HIGH (ref 7–23)
BUN SERPL-MCNC: 61 MG/DL — HIGH (ref 7–23)
BUN SERPL-MCNC: 62 MG/DL — HIGH (ref 7–23)
BUN SERPL-MCNC: 62 MG/DL — HIGH (ref 7–23)
BUN SERPL-MCNC: 63 MG/DL — HIGH (ref 7–23)
BUN SERPL-MCNC: 64 MG/DL — HIGH (ref 7–23)
BUN SERPL-MCNC: 64 MG/DL — HIGH (ref 7–23)
BUN SERPL-MCNC: 65 MG/DL — HIGH (ref 7–23)
BUN SERPL-MCNC: 66 MG/DL — HIGH (ref 7–23)
BUN SERPL-MCNC: 67 MG/DL — HIGH (ref 7–23)
BUN SERPL-MCNC: 67 MG/DL — HIGH (ref 7–23)
BUN SERPL-MCNC: 68 MG/DL — HIGH (ref 7–23)
BUN SERPL-MCNC: 71 MG/DL — HIGH (ref 7–23)
BUN SERPL-MCNC: 72 MG/DL — HIGH (ref 7–23)
BUN SERPL-MCNC: 74 MG/DL — HIGH (ref 7–23)
BUN SERPL-MCNC: 74 MG/DL — HIGH (ref 7–23)
BUN SERPL-MCNC: 78 MG/DL — HIGH (ref 7–23)
BUN SERPL-MCNC: 80 MG/DL — HIGH (ref 7–23)
BUN SERPL-MCNC: 85 MG/DL — HIGH (ref 7–23)
BUN SERPL-MCNC: 88 MG/DL — HIGH (ref 7–23)
BUN SERPL-MCNC: 98 MG/DL — HIGH (ref 7–23)
BUN SERPL-MCNC: 99 MG/DL — HIGH (ref 7–23)
BURR CELLS BLD QL SMEAR: PRESENT — SIGNIFICANT CHANGE UP
BURR CELLS BLD QL SMEAR: SLIGHT — SIGNIFICANT CHANGE UP
CA-I SERPL-SCNC: 1.16 MMOL/L — SIGNIFICANT CHANGE UP (ref 1.15–1.33)
CA-I SERPL-SCNC: 1.19 MMOL/L — SIGNIFICANT CHANGE UP (ref 1.15–1.33)
CA-I SERPL-SCNC: 1.2 MMOL/L — SIGNIFICANT CHANGE UP (ref 1.15–1.33)
CA-I SERPL-SCNC: 1.21 MMOL/L — SIGNIFICANT CHANGE UP (ref 1.15–1.33)
CA-I SERPL-SCNC: 1.22 MMOL/L — SIGNIFICANT CHANGE UP (ref 1.15–1.33)
CALCIUM SERPL-MCNC: 10.1 MG/DL — SIGNIFICANT CHANGE UP (ref 8.4–10.5)
CALCIUM SERPL-MCNC: 10.2 MG/DL — SIGNIFICANT CHANGE UP (ref 8.4–10.5)
CALCIUM SERPL-MCNC: 10.4 MG/DL — SIGNIFICANT CHANGE UP (ref 8.4–10.5)
CALCIUM SERPL-MCNC: 10.4 MG/DL — SIGNIFICANT CHANGE UP (ref 8.4–10.5)
CALCIUM SERPL-MCNC: 10.6 MG/DL — HIGH (ref 8.4–10.5)
CALCIUM SERPL-MCNC: 10.6 MG/DL — HIGH (ref 8.4–10.5)
CALCIUM SERPL-MCNC: 11.2 MG/DL — HIGH (ref 8.4–10.5)
CALCIUM SERPL-MCNC: 11.3 MG/DL — HIGH (ref 8.4–10.5)
CALCIUM SERPL-MCNC: 11.4 MG/DL — HIGH (ref 8.4–10.5)
CALCIUM SERPL-MCNC: 11.5 MG/DL — HIGH (ref 8.4–10.5)
CALCIUM SERPL-MCNC: 11.5 MG/DL — HIGH (ref 8.4–10.5)
CALCIUM SERPL-MCNC: 8.7 MG/DL — SIGNIFICANT CHANGE UP (ref 8.4–10.5)
CALCIUM SERPL-MCNC: 8.7 MG/DL — SIGNIFICANT CHANGE UP (ref 8.4–10.5)
CALCIUM SERPL-MCNC: 8.8 MG/DL — SIGNIFICANT CHANGE UP (ref 8.4–10.5)
CALCIUM SERPL-MCNC: 8.8 MG/DL — SIGNIFICANT CHANGE UP (ref 8.4–10.5)
CALCIUM SERPL-MCNC: 8.9 MG/DL — SIGNIFICANT CHANGE UP (ref 8.4–10.5)
CALCIUM SERPL-MCNC: 9 MG/DL — SIGNIFICANT CHANGE UP (ref 8.4–10.5)
CALCIUM SERPL-MCNC: 9.1 MG/DL — SIGNIFICANT CHANGE UP (ref 8.4–10.5)
CALCIUM SERPL-MCNC: 9.2 MG/DL — SIGNIFICANT CHANGE UP (ref 8.4–10.5)
CALCIUM SERPL-MCNC: 9.3 MG/DL — SIGNIFICANT CHANGE UP (ref 8.4–10.5)
CALCIUM SERPL-MCNC: 9.4 MG/DL — SIGNIFICANT CHANGE UP (ref 8.4–10.5)
CALCIUM SERPL-MCNC: 9.6 MG/DL — SIGNIFICANT CHANGE UP (ref 8.4–10.5)
CALCIUM SERPL-MCNC: 9.7 MG/DL — SIGNIFICANT CHANGE UP (ref 8.4–10.5)
CALCIUM SERPL-MCNC: 9.8 MG/DL — SIGNIFICANT CHANGE UP (ref 8.4–10.5)
CALCIUM SERPL-MCNC: 9.8 MG/DL — SIGNIFICANT CHANGE UP (ref 8.4–10.5)
CALCIUM SERPL-MCNC: 9.9 MG/DL — SIGNIFICANT CHANGE UP (ref 8.4–10.5)
CHLORIDE BLDV-SCNC: 100 MMOL/L — SIGNIFICANT CHANGE UP (ref 96–108)
CHLORIDE BLDV-SCNC: 98 MMOL/L — SIGNIFICANT CHANGE UP (ref 96–108)
CHLORIDE BLDV-SCNC: 99 MMOL/L — SIGNIFICANT CHANGE UP (ref 96–108)
CHLORIDE SERPL-SCNC: 100 MMOL/L — SIGNIFICANT CHANGE UP (ref 96–108)
CHLORIDE SERPL-SCNC: 100 MMOL/L — SIGNIFICANT CHANGE UP (ref 96–108)
CHLORIDE SERPL-SCNC: 102 MMOL/L — SIGNIFICANT CHANGE UP (ref 96–108)
CHLORIDE SERPL-SCNC: 103 MMOL/L — SIGNIFICANT CHANGE UP (ref 96–108)
CHLORIDE SERPL-SCNC: 103 MMOL/L — SIGNIFICANT CHANGE UP (ref 96–108)
CHLORIDE SERPL-SCNC: 92 MMOL/L — LOW (ref 96–108)
CHLORIDE SERPL-SCNC: 93 MMOL/L — LOW (ref 96–108)
CHLORIDE SERPL-SCNC: 94 MMOL/L — LOW (ref 96–108)
CHLORIDE SERPL-SCNC: 95 MMOL/L — LOW (ref 96–108)
CHLORIDE SERPL-SCNC: 96 MMOL/L — SIGNIFICANT CHANGE UP (ref 96–108)
CHLORIDE SERPL-SCNC: 97 MMOL/L — SIGNIFICANT CHANGE UP (ref 96–108)
CHLORIDE SERPL-SCNC: 98 MMOL/L — SIGNIFICANT CHANGE UP (ref 96–108)
CHLORIDE SERPL-SCNC: 99 MMOL/L — SIGNIFICANT CHANGE UP (ref 96–108)
CHOLEST SERPL-MCNC: 90 MG/DL — SIGNIFICANT CHANGE UP
CK MB BLD-MCNC: 4.4 % — HIGH (ref 0–3.5)
CK MB CFR SERPL CALC: 5.4 NG/ML — SIGNIFICANT CHANGE UP (ref 0–6.7)
CK SERPL-CCNC: 123 U/L — SIGNIFICANT CHANGE UP (ref 30–200)
CO2 BLDV-SCNC: 26 MMOL/L — SIGNIFICANT CHANGE UP (ref 22–26)
CO2 BLDV-SCNC: 29 MMOL/L — HIGH (ref 22–26)
CO2 BLDV-SCNC: 30 MMOL/L — HIGH (ref 22–26)
CO2 BLDV-SCNC: 31 MMOL/L — HIGH (ref 22–26)
CO2 SERPL-SCNC: 13 MMOL/L — LOW (ref 22–31)
CO2 SERPL-SCNC: 14 MMOL/L — LOW (ref 22–31)
CO2 SERPL-SCNC: 15 MMOL/L — LOW (ref 22–31)
CO2 SERPL-SCNC: 15 MMOL/L — LOW (ref 22–31)
CO2 SERPL-SCNC: 18 MMOL/L — LOW (ref 22–31)
CO2 SERPL-SCNC: 18 MMOL/L — LOW (ref 22–31)
CO2 SERPL-SCNC: 19 MMOL/L — LOW (ref 22–31)
CO2 SERPL-SCNC: 19 MMOL/L — LOW (ref 22–31)
CO2 SERPL-SCNC: 20 MMOL/L — LOW (ref 22–31)
CO2 SERPL-SCNC: 21 MMOL/L — LOW (ref 22–31)
CO2 SERPL-SCNC: 22 MMOL/L — SIGNIFICANT CHANGE UP (ref 22–31)
CO2 SERPL-SCNC: 23 MMOL/L — SIGNIFICANT CHANGE UP (ref 22–31)
CO2 SERPL-SCNC: 24 MMOL/L — SIGNIFICANT CHANGE UP (ref 22–31)
CO2 SERPL-SCNC: 25 MMOL/L — SIGNIFICANT CHANGE UP (ref 22–31)
CO2 SERPL-SCNC: 26 MMOL/L — SIGNIFICANT CHANGE UP (ref 22–31)
COLOR SPEC: SIGNIFICANT CHANGE UP
COVID-19 SPIKE DOMAIN AB INTERP: POSITIVE
COVID-19 SPIKE DOMAIN ANTIBODY RESULT: >250 U/ML — HIGH
CREAT ?TM UR-MCNC: 36 MG/DL — SIGNIFICANT CHANGE UP
CREAT SERPL-MCNC: 3.26 MG/DL — HIGH (ref 0.5–1.3)
CREAT SERPL-MCNC: 3.81 MG/DL — HIGH (ref 0.5–1.3)
CREAT SERPL-MCNC: 3.81 MG/DL — HIGH (ref 0.5–1.3)
CREAT SERPL-MCNC: 3.91 MG/DL — HIGH (ref 0.5–1.3)
CREAT SERPL-MCNC: 3.94 MG/DL — HIGH (ref 0.5–1.3)
CREAT SERPL-MCNC: 3.97 MG/DL — HIGH (ref 0.5–1.3)
CREAT SERPL-MCNC: 4 MG/DL — HIGH (ref 0.5–1.3)
CREAT SERPL-MCNC: 4.02 MG/DL — HIGH (ref 0.5–1.3)
CREAT SERPL-MCNC: 4.15 MG/DL — HIGH (ref 0.5–1.3)
CREAT SERPL-MCNC: 4.19 MG/DL — HIGH (ref 0.5–1.3)
CREAT SERPL-MCNC: 4.25 MG/DL — HIGH (ref 0.5–1.3)
CREAT SERPL-MCNC: 4.28 MG/DL — HIGH (ref 0.5–1.3)
CREAT SERPL-MCNC: 4.37 MG/DL — HIGH (ref 0.5–1.3)
CREAT SERPL-MCNC: 4.42 MG/DL — HIGH (ref 0.5–1.3)
CREAT SERPL-MCNC: 4.43 MG/DL — HIGH (ref 0.5–1.3)
CREAT SERPL-MCNC: 4.49 MG/DL — HIGH (ref 0.5–1.3)
CREAT SERPL-MCNC: 4.61 MG/DL — HIGH (ref 0.5–1.3)
CREAT SERPL-MCNC: 4.64 MG/DL — HIGH (ref 0.5–1.3)
CREAT SERPL-MCNC: 4.78 MG/DL — HIGH (ref 0.5–1.3)
CREAT SERPL-MCNC: 4.93 MG/DL — HIGH (ref 0.5–1.3)
CREAT SERPL-MCNC: 5.04 MG/DL — HIGH (ref 0.5–1.3)
CREAT SERPL-MCNC: 5.05 MG/DL — HIGH (ref 0.5–1.3)
CREAT SERPL-MCNC: 5.06 MG/DL — HIGH (ref 0.5–1.3)
CREAT SERPL-MCNC: 5.23 MG/DL — HIGH (ref 0.5–1.3)
CREAT SERPL-MCNC: 5.23 MG/DL — HIGH (ref 0.5–1.3)
CREAT SERPL-MCNC: 5.26 MG/DL — HIGH (ref 0.5–1.3)
CREAT SERPL-MCNC: 5.31 MG/DL — HIGH (ref 0.5–1.3)
CREAT SERPL-MCNC: 5.34 MG/DL — HIGH (ref 0.5–1.3)
CREAT SERPL-MCNC: 5.38 MG/DL — HIGH (ref 0.5–1.3)
CREAT SERPL-MCNC: 5.44 MG/DL — HIGH (ref 0.5–1.3)
CREAT SERPL-MCNC: 5.5 MG/DL — HIGH (ref 0.5–1.3)
CREAT SERPL-MCNC: 5.52 MG/DL — HIGH (ref 0.5–1.3)
CREAT SERPL-MCNC: 5.57 MG/DL — HIGH (ref 0.5–1.3)
CREAT SERPL-MCNC: 5.76 MG/DL — HIGH (ref 0.5–1.3)
CREAT SERPL-MCNC: 5.77 MG/DL — HIGH (ref 0.5–1.3)
CREAT SERPL-MCNC: 5.77 MG/DL — HIGH (ref 0.5–1.3)
CREAT SERPL-MCNC: 5.85 MG/DL — HIGH (ref 0.5–1.3)
CREAT SERPL-MCNC: 5.88 MG/DL — HIGH (ref 0.5–1.3)
CREAT SERPL-MCNC: 5.9 MG/DL — HIGH (ref 0.5–1.3)
CREAT SERPL-MCNC: 5.92 MG/DL — HIGH (ref 0.5–1.3)
CREAT SERPL-MCNC: 5.98 MG/DL — HIGH (ref 0.5–1.3)
CREAT SERPL-MCNC: 6 MG/DL — HIGH (ref 0.5–1.3)
CREAT SERPL-MCNC: 6.02 MG/DL — HIGH (ref 0.5–1.3)
CREAT SERPL-MCNC: 6.03 MG/DL — HIGH (ref 0.5–1.3)
CREAT SERPL-MCNC: 6.07 MG/DL — HIGH (ref 0.5–1.3)
CREAT SERPL-MCNC: 6.16 MG/DL — HIGH (ref 0.5–1.3)
CREAT SERPL-MCNC: 6.17 MG/DL — HIGH (ref 0.5–1.3)
CREAT SERPL-MCNC: 6.29 MG/DL — HIGH (ref 0.5–1.3)
CREAT SERPL-MCNC: 6.3 MG/DL — HIGH (ref 0.5–1.3)
CREAT SERPL-MCNC: 6.31 MG/DL — HIGH (ref 0.5–1.3)
CREAT SERPL-MCNC: 6.43 MG/DL — HIGH (ref 0.5–1.3)
CREAT SERPL-MCNC: 6.46 MG/DL — HIGH (ref 0.5–1.3)
CREAT SERPL-MCNC: 6.5 MG/DL — HIGH (ref 0.5–1.3)
CREAT SERPL-MCNC: 6.75 MG/DL — HIGH (ref 0.5–1.3)
CREAT SERPL-MCNC: 6.78 MG/DL — HIGH (ref 0.5–1.3)
CREAT SERPL-MCNC: 6.84 MG/DL — HIGH (ref 0.5–1.3)
CREAT SERPL-MCNC: 6.92 MG/DL — HIGH (ref 0.5–1.3)
CREAT SERPL-MCNC: 7.04 MG/DL — HIGH (ref 0.5–1.3)
CREAT SERPL-MCNC: 7.23 MG/DL — HIGH (ref 0.5–1.3)
CREAT SERPL-MCNC: 7.37 MG/DL — HIGH (ref 0.5–1.3)
CREAT SERPL-MCNC: 7.42 MG/DL — HIGH (ref 0.5–1.3)
CREAT SERPL-MCNC: 7.43 MG/DL — HIGH (ref 0.5–1.3)
CREAT SERPL-MCNC: 7.51 MG/DL — HIGH (ref 0.5–1.3)
CULTURE RESULTS: SIGNIFICANT CHANGE UP
D DIMER BLD IA.RAPID-MCNC: 1128 NG/ML DDU — HIGH
DACRYOCYTES BLD QL SMEAR: SLIGHT — SIGNIFICANT CHANGE UP
DACRYOCYTES BLD QL SMEAR: SLIGHT — SIGNIFICANT CHANGE UP
DIFF PNL FLD: ABNORMAL
DRVVT SCREEN TO CONFIRM RATIO: SIGNIFICANT CHANGE UP
ELLIPTOCYTES BLD QL SMEAR: SLIGHT — SIGNIFICANT CHANGE UP
ELLIPTOCYTES BLD QL SMEAR: SLIGHT — SIGNIFICANT CHANGE UP
EOSINOPHIL # BLD AUTO: 0 K/UL — SIGNIFICANT CHANGE UP (ref 0–0.5)
EOSINOPHIL # BLD AUTO: 0 K/UL — SIGNIFICANT CHANGE UP (ref 0–0.5)
EOSINOPHIL # BLD AUTO: 0.01 K/UL — SIGNIFICANT CHANGE UP (ref 0–0.5)
EOSINOPHIL # BLD AUTO: 0.12 K/UL — SIGNIFICANT CHANGE UP (ref 0–0.5)
EOSINOPHIL # BLD AUTO: 0.18 K/UL — SIGNIFICANT CHANGE UP (ref 0–0.5)
EOSINOPHIL # BLD AUTO: 0.22 K/UL — SIGNIFICANT CHANGE UP (ref 0–0.5)
EOSINOPHIL # BLD AUTO: 0.23 K/UL — SIGNIFICANT CHANGE UP (ref 0–0.5)
EOSINOPHIL # BLD AUTO: 0.24 K/UL — SIGNIFICANT CHANGE UP (ref 0–0.5)
EOSINOPHIL # BLD AUTO: 0.25 K/UL — SIGNIFICANT CHANGE UP (ref 0–0.5)
EOSINOPHIL # BLD AUTO: 0.29 K/UL — SIGNIFICANT CHANGE UP (ref 0–0.5)
EOSINOPHIL # BLD AUTO: 0.31 K/UL — SIGNIFICANT CHANGE UP (ref 0–0.5)
EOSINOPHIL # BLD AUTO: 0.31 K/UL — SIGNIFICANT CHANGE UP (ref 0–0.5)
EOSINOPHIL # BLD AUTO: 0.36 K/UL — SIGNIFICANT CHANGE UP (ref 0–0.5)
EOSINOPHIL # BLD AUTO: 0.39 K/UL — SIGNIFICANT CHANGE UP (ref 0–0.5)
EOSINOPHIL NFR BLD AUTO: 0 % — SIGNIFICANT CHANGE UP (ref 0–6)
EOSINOPHIL NFR BLD AUTO: 0 % — SIGNIFICANT CHANGE UP (ref 0–6)
EOSINOPHIL NFR BLD AUTO: 0.1 % — SIGNIFICANT CHANGE UP (ref 0–6)
EOSINOPHIL NFR BLD AUTO: 0.8 % — SIGNIFICANT CHANGE UP (ref 0–6)
EOSINOPHIL NFR BLD AUTO: 1 % — SIGNIFICANT CHANGE UP (ref 0–6)
EOSINOPHIL NFR BLD AUTO: 1.1 % — SIGNIFICANT CHANGE UP (ref 0–6)
EOSINOPHIL NFR BLD AUTO: 1.3 % — SIGNIFICANT CHANGE UP (ref 0–6)
EOSINOPHIL NFR BLD AUTO: 1.7 % — SIGNIFICANT CHANGE UP (ref 0–6)
EOSINOPHIL NFR BLD AUTO: 1.9 % — SIGNIFICANT CHANGE UP (ref 0–6)
EOSINOPHIL NFR BLD AUTO: 2 % — SIGNIFICANT CHANGE UP (ref 0–6)
EOSINOPHIL NFR BLD AUTO: 2.1 % — SIGNIFICANT CHANGE UP (ref 0–6)
EOSINOPHIL NFR BLD AUTO: 2.2 % — SIGNIFICANT CHANGE UP (ref 0–6)
EOSINOPHIL NFR BLD AUTO: 2.4 % — SIGNIFICANT CHANGE UP (ref 0–6)
EOSINOPHIL NFR BLD AUTO: 2.4 % — SIGNIFICANT CHANGE UP (ref 0–6)
EOSINOPHIL NFR BLD AUTO: 2.7 % — SIGNIFICANT CHANGE UP (ref 0–6)
EOSINOPHIL NFR BLD AUTO: 3.9 % — SIGNIFICANT CHANGE UP (ref 0–6)
EPI CELLS # UR: 0 /HPF — SIGNIFICANT CHANGE UP
ERYTHROCYTE [SEDIMENTATION RATE] IN BLOOD: 103 MM/HR — HIGH (ref 0–20)
ESTIMATED AVERAGE GLUCOSE: 137 MG/DL — HIGH (ref 68–114)
FACT IX PPP CHRO-ACNC: 101 % — SIGNIFICANT CHANGE UP (ref 80–165)
FACT VIII ACT/NOR PPP: 74 % — SIGNIFICANT CHANGE UP (ref 60–125)
FACT XII ACT/NOR PPP: 41 % — LOW (ref 70–145)
FACT XIIA PPP-ACNC: 59 % — SIGNIFICANT CHANGE UP (ref 45–150)
FERRITIN SERPL-MCNC: 3077 NG/ML — HIGH (ref 30–400)
FERRITIN SERPL-MCNC: 490 NG/ML — HIGH (ref 30–400)
FIBRINOGEN PPP-MCNC: 682 MG/DL — HIGH (ref 290–520)
FOLATE SERPL-MCNC: 13.5 NG/ML — SIGNIFICANT CHANGE UP
FOLATE SERPL-MCNC: >20 NG/ML — SIGNIFICANT CHANGE UP
GAMMA GLOBULIN: 1.7 G/DL — HIGH (ref 0.6–1.6)
GAMMA GLOBULIN: 1.8 G/DL — HIGH (ref 0.6–1.6)
GAS PNL BLDA: SIGNIFICANT CHANGE UP
GAS PNL BLDA: SIGNIFICANT CHANGE UP
GAS PNL BLDV: 128 MMOL/L — LOW (ref 136–145)
GAS PNL BLDV: 132 MMOL/L — LOW (ref 136–145)
GAS PNL BLDV: 132 MMOL/L — LOW (ref 136–145)
GAS PNL BLDV: 134 MMOL/L — LOW (ref 136–145)
GAS PNL BLDV: 135 MMOL/L — LOW (ref 136–145)
GAS PNL BLDV: SIGNIFICANT CHANGE UP
GIANT PLATELETS BLD QL SMEAR: PRESENT — SIGNIFICANT CHANGE UP
GLUCOSE BLDC GLUCOMTR-MCNC: 100 MG/DL — HIGH (ref 70–99)
GLUCOSE BLDC GLUCOMTR-MCNC: 101 MG/DL — HIGH (ref 70–99)
GLUCOSE BLDC GLUCOMTR-MCNC: 102 MG/DL — HIGH (ref 70–99)
GLUCOSE BLDC GLUCOMTR-MCNC: 103 MG/DL — HIGH (ref 70–99)
GLUCOSE BLDC GLUCOMTR-MCNC: 105 MG/DL — HIGH (ref 70–99)
GLUCOSE BLDC GLUCOMTR-MCNC: 106 MG/DL — HIGH (ref 70–99)
GLUCOSE BLDC GLUCOMTR-MCNC: 107 MG/DL — HIGH (ref 70–99)
GLUCOSE BLDC GLUCOMTR-MCNC: 108 MG/DL — HIGH (ref 70–99)
GLUCOSE BLDC GLUCOMTR-MCNC: 109 MG/DL — HIGH (ref 70–99)
GLUCOSE BLDC GLUCOMTR-MCNC: 111 MG/DL — HIGH (ref 70–99)
GLUCOSE BLDC GLUCOMTR-MCNC: 112 MG/DL — HIGH (ref 70–99)
GLUCOSE BLDC GLUCOMTR-MCNC: 113 MG/DL — HIGH (ref 70–99)
GLUCOSE BLDC GLUCOMTR-MCNC: 114 MG/DL — HIGH (ref 70–99)
GLUCOSE BLDC GLUCOMTR-MCNC: 115 MG/DL — HIGH (ref 70–99)
GLUCOSE BLDC GLUCOMTR-MCNC: 116 MG/DL — HIGH (ref 70–99)
GLUCOSE BLDC GLUCOMTR-MCNC: 117 MG/DL — HIGH (ref 70–99)
GLUCOSE BLDC GLUCOMTR-MCNC: 118 MG/DL — HIGH (ref 70–99)
GLUCOSE BLDC GLUCOMTR-MCNC: 119 MG/DL — HIGH (ref 70–99)
GLUCOSE BLDC GLUCOMTR-MCNC: 120 MG/DL — HIGH (ref 70–99)
GLUCOSE BLDC GLUCOMTR-MCNC: 121 MG/DL — HIGH (ref 70–99)
GLUCOSE BLDC GLUCOMTR-MCNC: 122 MG/DL — HIGH (ref 70–99)
GLUCOSE BLDC GLUCOMTR-MCNC: 123 MG/DL — HIGH (ref 70–99)
GLUCOSE BLDC GLUCOMTR-MCNC: 123 MG/DL — HIGH (ref 70–99)
GLUCOSE BLDC GLUCOMTR-MCNC: 124 MG/DL — HIGH (ref 70–99)
GLUCOSE BLDC GLUCOMTR-MCNC: 125 MG/DL — HIGH (ref 70–99)
GLUCOSE BLDC GLUCOMTR-MCNC: 127 MG/DL — HIGH (ref 70–99)
GLUCOSE BLDC GLUCOMTR-MCNC: 128 MG/DL — HIGH (ref 70–99)
GLUCOSE BLDC GLUCOMTR-MCNC: 129 MG/DL — HIGH (ref 70–99)
GLUCOSE BLDC GLUCOMTR-MCNC: 130 MG/DL — HIGH (ref 70–99)
GLUCOSE BLDC GLUCOMTR-MCNC: 131 MG/DL — HIGH (ref 70–99)
GLUCOSE BLDC GLUCOMTR-MCNC: 132 MG/DL — HIGH (ref 70–99)
GLUCOSE BLDC GLUCOMTR-MCNC: 133 MG/DL — HIGH (ref 70–99)
GLUCOSE BLDC GLUCOMTR-MCNC: 134 MG/DL — HIGH (ref 70–99)
GLUCOSE BLDC GLUCOMTR-MCNC: 135 MG/DL — HIGH (ref 70–99)
GLUCOSE BLDC GLUCOMTR-MCNC: 135 MG/DL — HIGH (ref 70–99)
GLUCOSE BLDC GLUCOMTR-MCNC: 136 MG/DL — HIGH (ref 70–99)
GLUCOSE BLDC GLUCOMTR-MCNC: 137 MG/DL — HIGH (ref 70–99)
GLUCOSE BLDC GLUCOMTR-MCNC: 138 MG/DL — HIGH (ref 70–99)
GLUCOSE BLDC GLUCOMTR-MCNC: 139 MG/DL — HIGH (ref 70–99)
GLUCOSE BLDC GLUCOMTR-MCNC: 140 MG/DL — HIGH (ref 70–99)
GLUCOSE BLDC GLUCOMTR-MCNC: 141 MG/DL — HIGH (ref 70–99)
GLUCOSE BLDC GLUCOMTR-MCNC: 142 MG/DL — HIGH (ref 70–99)
GLUCOSE BLDC GLUCOMTR-MCNC: 144 MG/DL — HIGH (ref 70–99)
GLUCOSE BLDC GLUCOMTR-MCNC: 145 MG/DL — HIGH (ref 70–99)
GLUCOSE BLDC GLUCOMTR-MCNC: 146 MG/DL — HIGH (ref 70–99)
GLUCOSE BLDC GLUCOMTR-MCNC: 147 MG/DL — HIGH (ref 70–99)
GLUCOSE BLDC GLUCOMTR-MCNC: 148 MG/DL — HIGH (ref 70–99)
GLUCOSE BLDC GLUCOMTR-MCNC: 149 MG/DL — HIGH (ref 70–99)
GLUCOSE BLDC GLUCOMTR-MCNC: 150 MG/DL — HIGH (ref 70–99)
GLUCOSE BLDC GLUCOMTR-MCNC: 151 MG/DL — HIGH (ref 70–99)
GLUCOSE BLDC GLUCOMTR-MCNC: 151 MG/DL — HIGH (ref 70–99)
GLUCOSE BLDC GLUCOMTR-MCNC: 152 MG/DL — HIGH (ref 70–99)
GLUCOSE BLDC GLUCOMTR-MCNC: 153 MG/DL — HIGH (ref 70–99)
GLUCOSE BLDC GLUCOMTR-MCNC: 154 MG/DL — HIGH (ref 70–99)
GLUCOSE BLDC GLUCOMTR-MCNC: 155 MG/DL — HIGH (ref 70–99)
GLUCOSE BLDC GLUCOMTR-MCNC: 156 MG/DL — HIGH (ref 70–99)
GLUCOSE BLDC GLUCOMTR-MCNC: 158 MG/DL — HIGH (ref 70–99)
GLUCOSE BLDC GLUCOMTR-MCNC: 159 MG/DL — HIGH (ref 70–99)
GLUCOSE BLDC GLUCOMTR-MCNC: 160 MG/DL — HIGH (ref 70–99)
GLUCOSE BLDC GLUCOMTR-MCNC: 161 MG/DL — HIGH (ref 70–99)
GLUCOSE BLDC GLUCOMTR-MCNC: 162 MG/DL — HIGH (ref 70–99)
GLUCOSE BLDC GLUCOMTR-MCNC: 163 MG/DL — HIGH (ref 70–99)
GLUCOSE BLDC GLUCOMTR-MCNC: 164 MG/DL — HIGH (ref 70–99)
GLUCOSE BLDC GLUCOMTR-MCNC: 165 MG/DL — HIGH (ref 70–99)
GLUCOSE BLDC GLUCOMTR-MCNC: 166 MG/DL — HIGH (ref 70–99)
GLUCOSE BLDC GLUCOMTR-MCNC: 166 MG/DL — HIGH (ref 70–99)
GLUCOSE BLDC GLUCOMTR-MCNC: 167 MG/DL — HIGH (ref 70–99)
GLUCOSE BLDC GLUCOMTR-MCNC: 168 MG/DL — HIGH (ref 70–99)
GLUCOSE BLDC GLUCOMTR-MCNC: 168 MG/DL — HIGH (ref 70–99)
GLUCOSE BLDC GLUCOMTR-MCNC: 169 MG/DL — HIGH (ref 70–99)
GLUCOSE BLDC GLUCOMTR-MCNC: 170 MG/DL — HIGH (ref 70–99)
GLUCOSE BLDC GLUCOMTR-MCNC: 171 MG/DL — HIGH (ref 70–99)
GLUCOSE BLDC GLUCOMTR-MCNC: 172 MG/DL — HIGH (ref 70–99)
GLUCOSE BLDC GLUCOMTR-MCNC: 173 MG/DL — HIGH (ref 70–99)
GLUCOSE BLDC GLUCOMTR-MCNC: 174 MG/DL — HIGH (ref 70–99)
GLUCOSE BLDC GLUCOMTR-MCNC: 175 MG/DL — HIGH (ref 70–99)
GLUCOSE BLDC GLUCOMTR-MCNC: 176 MG/DL — HIGH (ref 70–99)
GLUCOSE BLDC GLUCOMTR-MCNC: 176 MG/DL — HIGH (ref 70–99)
GLUCOSE BLDC GLUCOMTR-MCNC: 177 MG/DL — HIGH (ref 70–99)
GLUCOSE BLDC GLUCOMTR-MCNC: 178 MG/DL — HIGH (ref 70–99)
GLUCOSE BLDC GLUCOMTR-MCNC: 180 MG/DL — HIGH (ref 70–99)
GLUCOSE BLDC GLUCOMTR-MCNC: 181 MG/DL — HIGH (ref 70–99)
GLUCOSE BLDC GLUCOMTR-MCNC: 182 MG/DL — HIGH (ref 70–99)
GLUCOSE BLDC GLUCOMTR-MCNC: 183 MG/DL — HIGH (ref 70–99)
GLUCOSE BLDC GLUCOMTR-MCNC: 188 MG/DL — HIGH (ref 70–99)
GLUCOSE BLDC GLUCOMTR-MCNC: 188 MG/DL — HIGH (ref 70–99)
GLUCOSE BLDC GLUCOMTR-MCNC: 189 MG/DL — HIGH (ref 70–99)
GLUCOSE BLDC GLUCOMTR-MCNC: 190 MG/DL — HIGH (ref 70–99)
GLUCOSE BLDC GLUCOMTR-MCNC: 191 MG/DL — HIGH (ref 70–99)
GLUCOSE BLDC GLUCOMTR-MCNC: 193 MG/DL — HIGH (ref 70–99)
GLUCOSE BLDC GLUCOMTR-MCNC: 195 MG/DL — HIGH (ref 70–99)
GLUCOSE BLDC GLUCOMTR-MCNC: 198 MG/DL — HIGH (ref 70–99)
GLUCOSE BLDC GLUCOMTR-MCNC: 198 MG/DL — HIGH (ref 70–99)
GLUCOSE BLDC GLUCOMTR-MCNC: 199 MG/DL — HIGH (ref 70–99)
GLUCOSE BLDC GLUCOMTR-MCNC: 199 MG/DL — HIGH (ref 70–99)
GLUCOSE BLDC GLUCOMTR-MCNC: 200 MG/DL — HIGH (ref 70–99)
GLUCOSE BLDC GLUCOMTR-MCNC: 201 MG/DL — HIGH (ref 70–99)
GLUCOSE BLDC GLUCOMTR-MCNC: 202 MG/DL — HIGH (ref 70–99)
GLUCOSE BLDC GLUCOMTR-MCNC: 208 MG/DL — HIGH (ref 70–99)
GLUCOSE BLDC GLUCOMTR-MCNC: 209 MG/DL — HIGH (ref 70–99)
GLUCOSE BLDC GLUCOMTR-MCNC: 212 MG/DL — HIGH (ref 70–99)
GLUCOSE BLDC GLUCOMTR-MCNC: 213 MG/DL — HIGH (ref 70–99)
GLUCOSE BLDC GLUCOMTR-MCNC: 218 MG/DL — HIGH (ref 70–99)
GLUCOSE BLDC GLUCOMTR-MCNC: 220 MG/DL — HIGH (ref 70–99)
GLUCOSE BLDC GLUCOMTR-MCNC: 222 MG/DL — HIGH (ref 70–99)
GLUCOSE BLDC GLUCOMTR-MCNC: 223 MG/DL — HIGH (ref 70–99)
GLUCOSE BLDC GLUCOMTR-MCNC: 224 MG/DL — HIGH (ref 70–99)
GLUCOSE BLDC GLUCOMTR-MCNC: 227 MG/DL — HIGH (ref 70–99)
GLUCOSE BLDC GLUCOMTR-MCNC: 229 MG/DL — HIGH (ref 70–99)
GLUCOSE BLDC GLUCOMTR-MCNC: 232 MG/DL — HIGH (ref 70–99)
GLUCOSE BLDC GLUCOMTR-MCNC: 232 MG/DL — HIGH (ref 70–99)
GLUCOSE BLDC GLUCOMTR-MCNC: 237 MG/DL — HIGH (ref 70–99)
GLUCOSE BLDC GLUCOMTR-MCNC: 238 MG/DL — HIGH (ref 70–99)
GLUCOSE BLDC GLUCOMTR-MCNC: 240 MG/DL — HIGH (ref 70–99)
GLUCOSE BLDC GLUCOMTR-MCNC: 247 MG/DL — HIGH (ref 70–99)
GLUCOSE BLDC GLUCOMTR-MCNC: 248 MG/DL — HIGH (ref 70–99)
GLUCOSE BLDC GLUCOMTR-MCNC: 250 MG/DL — HIGH (ref 70–99)
GLUCOSE BLDC GLUCOMTR-MCNC: 252 MG/DL — HIGH (ref 70–99)
GLUCOSE BLDC GLUCOMTR-MCNC: 254 MG/DL — HIGH (ref 70–99)
GLUCOSE BLDC GLUCOMTR-MCNC: 255 MG/DL — HIGH (ref 70–99)
GLUCOSE BLDC GLUCOMTR-MCNC: 275 MG/DL — HIGH (ref 70–99)
GLUCOSE BLDC GLUCOMTR-MCNC: 30 MG/DL — CRITICAL LOW (ref 70–99)
GLUCOSE BLDC GLUCOMTR-MCNC: 32 MG/DL — CRITICAL LOW (ref 70–99)
GLUCOSE BLDC GLUCOMTR-MCNC: 44 MG/DL — CRITICAL LOW (ref 70–99)
GLUCOSE BLDC GLUCOMTR-MCNC: 50 MG/DL — CRITICAL LOW (ref 70–99)
GLUCOSE BLDC GLUCOMTR-MCNC: 51 MG/DL — CRITICAL LOW (ref 70–99)
GLUCOSE BLDC GLUCOMTR-MCNC: 53 MG/DL — CRITICAL LOW (ref 70–99)
GLUCOSE BLDC GLUCOMTR-MCNC: 57 MG/DL — LOW (ref 70–99)
GLUCOSE BLDC GLUCOMTR-MCNC: 68 MG/DL — LOW (ref 70–99)
GLUCOSE BLDC GLUCOMTR-MCNC: 71 MG/DL — SIGNIFICANT CHANGE UP (ref 70–99)
GLUCOSE BLDC GLUCOMTR-MCNC: 72 MG/DL — SIGNIFICANT CHANGE UP (ref 70–99)
GLUCOSE BLDC GLUCOMTR-MCNC: 73 MG/DL — SIGNIFICANT CHANGE UP (ref 70–99)
GLUCOSE BLDC GLUCOMTR-MCNC: 74 MG/DL — SIGNIFICANT CHANGE UP (ref 70–99)
GLUCOSE BLDC GLUCOMTR-MCNC: 75 MG/DL — SIGNIFICANT CHANGE UP (ref 70–99)
GLUCOSE BLDC GLUCOMTR-MCNC: 75 MG/DL — SIGNIFICANT CHANGE UP (ref 70–99)
GLUCOSE BLDC GLUCOMTR-MCNC: 77 MG/DL — SIGNIFICANT CHANGE UP (ref 70–99)
GLUCOSE BLDC GLUCOMTR-MCNC: 80 MG/DL — SIGNIFICANT CHANGE UP (ref 70–99)
GLUCOSE BLDC GLUCOMTR-MCNC: 81 MG/DL — SIGNIFICANT CHANGE UP (ref 70–99)
GLUCOSE BLDC GLUCOMTR-MCNC: 82 MG/DL — SIGNIFICANT CHANGE UP (ref 70–99)
GLUCOSE BLDC GLUCOMTR-MCNC: 86 MG/DL — SIGNIFICANT CHANGE UP (ref 70–99)
GLUCOSE BLDC GLUCOMTR-MCNC: 89 MG/DL — SIGNIFICANT CHANGE UP (ref 70–99)
GLUCOSE BLDC GLUCOMTR-MCNC: 90 MG/DL — SIGNIFICANT CHANGE UP (ref 70–99)
GLUCOSE BLDC GLUCOMTR-MCNC: 93 MG/DL — SIGNIFICANT CHANGE UP (ref 70–99)
GLUCOSE BLDC GLUCOMTR-MCNC: 94 MG/DL — SIGNIFICANT CHANGE UP (ref 70–99)
GLUCOSE BLDC GLUCOMTR-MCNC: 96 MG/DL — SIGNIFICANT CHANGE UP (ref 70–99)
GLUCOSE BLDC GLUCOMTR-MCNC: 97 MG/DL — SIGNIFICANT CHANGE UP (ref 70–99)
GLUCOSE BLDC GLUCOMTR-MCNC: 97 MG/DL — SIGNIFICANT CHANGE UP (ref 70–99)
GLUCOSE BLDC GLUCOMTR-MCNC: 98 MG/DL — SIGNIFICANT CHANGE UP (ref 70–99)
GLUCOSE BLDV-MCNC: 141 MG/DL — HIGH (ref 70–99)
GLUCOSE BLDV-MCNC: 152 MG/DL — HIGH (ref 70–99)
GLUCOSE BLDV-MCNC: 154 MG/DL — HIGH (ref 70–99)
GLUCOSE BLDV-MCNC: 201 MG/DL — HIGH (ref 70–99)
GLUCOSE BLDV-MCNC: 207 MG/DL — HIGH (ref 70–99)
GLUCOSE SERPL-MCNC: 100 MG/DL — HIGH (ref 70–99)
GLUCOSE SERPL-MCNC: 101 MG/DL — HIGH (ref 70–99)
GLUCOSE SERPL-MCNC: 105 MG/DL — HIGH (ref 70–99)
GLUCOSE SERPL-MCNC: 106 MG/DL — HIGH (ref 70–99)
GLUCOSE SERPL-MCNC: 106 MG/DL — HIGH (ref 70–99)
GLUCOSE SERPL-MCNC: 107 MG/DL — HIGH (ref 70–99)
GLUCOSE SERPL-MCNC: 108 MG/DL — HIGH (ref 70–99)
GLUCOSE SERPL-MCNC: 109 MG/DL — HIGH (ref 70–99)
GLUCOSE SERPL-MCNC: 109 MG/DL — HIGH (ref 70–99)
GLUCOSE SERPL-MCNC: 110 MG/DL — HIGH (ref 70–99)
GLUCOSE SERPL-MCNC: 110 MG/DL — HIGH (ref 70–99)
GLUCOSE SERPL-MCNC: 113 MG/DL — HIGH (ref 70–99)
GLUCOSE SERPL-MCNC: 113 MG/DL — HIGH (ref 70–99)
GLUCOSE SERPL-MCNC: 114 MG/DL — HIGH (ref 70–99)
GLUCOSE SERPL-MCNC: 117 MG/DL — HIGH (ref 70–99)
GLUCOSE SERPL-MCNC: 117 MG/DL — HIGH (ref 70–99)
GLUCOSE SERPL-MCNC: 118 MG/DL — HIGH (ref 70–99)
GLUCOSE SERPL-MCNC: 119 MG/DL — HIGH (ref 70–99)
GLUCOSE SERPL-MCNC: 119 MG/DL — HIGH (ref 70–99)
GLUCOSE SERPL-MCNC: 120 MG/DL — HIGH (ref 70–99)
GLUCOSE SERPL-MCNC: 122 MG/DL — HIGH (ref 70–99)
GLUCOSE SERPL-MCNC: 123 MG/DL — HIGH (ref 70–99)
GLUCOSE SERPL-MCNC: 123 MG/DL — HIGH (ref 70–99)
GLUCOSE SERPL-MCNC: 124 MG/DL — HIGH (ref 70–99)
GLUCOSE SERPL-MCNC: 131 MG/DL — HIGH (ref 70–99)
GLUCOSE SERPL-MCNC: 132 MG/DL — HIGH (ref 70–99)
GLUCOSE SERPL-MCNC: 132 MG/DL — HIGH (ref 70–99)
GLUCOSE SERPL-MCNC: 138 MG/DL — HIGH (ref 70–99)
GLUCOSE SERPL-MCNC: 138 MG/DL — HIGH (ref 70–99)
GLUCOSE SERPL-MCNC: 139 MG/DL — HIGH (ref 70–99)
GLUCOSE SERPL-MCNC: 141 MG/DL — HIGH (ref 70–99)
GLUCOSE SERPL-MCNC: 145 MG/DL — HIGH (ref 70–99)
GLUCOSE SERPL-MCNC: 146 MG/DL — HIGH (ref 70–99)
GLUCOSE SERPL-MCNC: 147 MG/DL — HIGH (ref 70–99)
GLUCOSE SERPL-MCNC: 149 MG/DL — HIGH (ref 70–99)
GLUCOSE SERPL-MCNC: 154 MG/DL — HIGH (ref 70–99)
GLUCOSE SERPL-MCNC: 176 MG/DL — HIGH (ref 70–99)
GLUCOSE SERPL-MCNC: 177 MG/DL — HIGH (ref 70–99)
GLUCOSE SERPL-MCNC: 180 MG/DL — HIGH (ref 70–99)
GLUCOSE SERPL-MCNC: 194 MG/DL — HIGH (ref 70–99)
GLUCOSE SERPL-MCNC: 197 MG/DL — HIGH (ref 70–99)
GLUCOSE SERPL-MCNC: 198 MG/DL — HIGH (ref 70–99)
GLUCOSE SERPL-MCNC: 199 MG/DL — HIGH (ref 70–99)
GLUCOSE SERPL-MCNC: 202 MG/DL — HIGH (ref 70–99)
GLUCOSE SERPL-MCNC: 205 MG/DL — HIGH (ref 70–99)
GLUCOSE SERPL-MCNC: 205 MG/DL — HIGH (ref 70–99)
GLUCOSE SERPL-MCNC: 212 MG/DL — HIGH (ref 70–99)
GLUCOSE SERPL-MCNC: 213 MG/DL — HIGH (ref 70–99)
GLUCOSE SERPL-MCNC: 217 MG/DL — HIGH (ref 70–99)
GLUCOSE SERPL-MCNC: 41 MG/DL — CRITICAL LOW (ref 70–99)
GLUCOSE SERPL-MCNC: 82 MG/DL — SIGNIFICANT CHANGE UP (ref 70–99)
GLUCOSE SERPL-MCNC: 84 MG/DL — SIGNIFICANT CHANGE UP (ref 70–99)
GLUCOSE SERPL-MCNC: 86 MG/DL — SIGNIFICANT CHANGE UP (ref 70–99)
GLUCOSE SERPL-MCNC: 89 MG/DL — SIGNIFICANT CHANGE UP (ref 70–99)
GLUCOSE SERPL-MCNC: 91 MG/DL — SIGNIFICANT CHANGE UP (ref 70–99)
GLUCOSE SERPL-MCNC: 93 MG/DL — SIGNIFICANT CHANGE UP (ref 70–99)
GLUCOSE SERPL-MCNC: 94 MG/DL — SIGNIFICANT CHANGE UP (ref 70–99)
GLUCOSE SERPL-MCNC: 95 MG/DL — SIGNIFICANT CHANGE UP (ref 70–99)
GLUCOSE SERPL-MCNC: 95 MG/DL — SIGNIFICANT CHANGE UP (ref 70–99)
GLUCOSE UR QL: NEGATIVE — SIGNIFICANT CHANGE UP
GRAM STN FLD: SIGNIFICANT CHANGE UP
HAV IGM SER-ACNC: SIGNIFICANT CHANGE UP
HAV IGM SER-ACNC: SIGNIFICANT CHANGE UP
HBV CORE AB SER-ACNC: SIGNIFICANT CHANGE UP
HBV CORE IGM SER-ACNC: SIGNIFICANT CHANGE UP
HBV SURFACE AB SER-ACNC: 20.8 MIU/ML — SIGNIFICANT CHANGE UP
HBV SURFACE AG SER-ACNC: SIGNIFICANT CHANGE UP
HCO3 BLDV-SCNC: 24 MMOL/L — SIGNIFICANT CHANGE UP (ref 22–29)
HCO3 BLDV-SCNC: 24 MMOL/L — SIGNIFICANT CHANGE UP (ref 22–29)
HCO3 BLDV-SCNC: 25 MMOL/L — SIGNIFICANT CHANGE UP (ref 22–29)
HCO3 BLDV-SCNC: 28 MMOL/L — SIGNIFICANT CHANGE UP (ref 22–29)
HCO3 BLDV-SCNC: 28 MMOL/L — SIGNIFICANT CHANGE UP (ref 22–29)
HCO3 BLDV-SCNC: 30 MMOL/L — HIGH (ref 22–29)
HCT VFR BLD CALC: 19 % — CRITICAL LOW (ref 39–50)
HCT VFR BLD CALC: 19.1 % — CRITICAL LOW (ref 39–50)
HCT VFR BLD CALC: 19.4 % — CRITICAL LOW (ref 39–50)
HCT VFR BLD CALC: 19.6 % — CRITICAL LOW (ref 39–50)
HCT VFR BLD CALC: 19.7 % — CRITICAL LOW (ref 39–50)
HCT VFR BLD CALC: 20.4 % — CRITICAL LOW (ref 39–50)
HCT VFR BLD CALC: 20.4 % — CRITICAL LOW (ref 39–50)
HCT VFR BLD CALC: 20.8 % — CRITICAL LOW (ref 39–50)
HCT VFR BLD CALC: 21.2 % — LOW (ref 39–50)
HCT VFR BLD CALC: 21.3 % — LOW (ref 39–50)
HCT VFR BLD CALC: 21.3 % — LOW (ref 39–50)
HCT VFR BLD CALC: 21.5 % — LOW (ref 39–50)
HCT VFR BLD CALC: 21.8 % — LOW (ref 39–50)
HCT VFR BLD CALC: 21.8 % — LOW (ref 39–50)
HCT VFR BLD CALC: 22.4 % — LOW (ref 39–50)
HCT VFR BLD CALC: 22.4 % — LOW (ref 39–50)
HCT VFR BLD CALC: 22.5 % — LOW (ref 39–50)
HCT VFR BLD CALC: 22.5 % — LOW (ref 39–50)
HCT VFR BLD CALC: 22.7 % — LOW (ref 39–50)
HCT VFR BLD CALC: 22.9 % — LOW (ref 39–50)
HCT VFR BLD CALC: 22.9 % — LOW (ref 39–50)
HCT VFR BLD CALC: 23.1 % — LOW (ref 39–50)
HCT VFR BLD CALC: 23.1 % — LOW (ref 39–50)
HCT VFR BLD CALC: 23.4 % — LOW (ref 39–50)
HCT VFR BLD CALC: 23.4 % — LOW (ref 39–50)
HCT VFR BLD CALC: 23.9 % — LOW (ref 39–50)
HCT VFR BLD CALC: 24.2 % — LOW (ref 39–50)
HCT VFR BLD CALC: 24.4 % — LOW (ref 39–50)
HCT VFR BLD CALC: 24.8 % — LOW (ref 39–50)
HCT VFR BLD CALC: 24.9 % — LOW (ref 39–50)
HCT VFR BLD CALC: 25 % — LOW (ref 39–50)
HCT VFR BLD CALC: 25.1 % — LOW (ref 39–50)
HCT VFR BLD CALC: 25.5 % — LOW (ref 39–50)
HCT VFR BLD CALC: 25.5 % — LOW (ref 39–50)
HCT VFR BLD CALC: 25.7 % — LOW (ref 39–50)
HCT VFR BLD CALC: 25.8 % — LOW (ref 39–50)
HCT VFR BLD CALC: 25.8 % — LOW (ref 39–50)
HCT VFR BLD CALC: 26 % — LOW (ref 39–50)
HCT VFR BLD CALC: 26 % — LOW (ref 39–50)
HCT VFR BLD CALC: 26.4 % — LOW (ref 39–50)
HCT VFR BLD CALC: 26.5 % — LOW (ref 39–50)
HCT VFR BLD CALC: 26.6 % — LOW (ref 39–50)
HCT VFR BLD CALC: 27.1 % — LOW (ref 39–50)
HCT VFR BLD CALC: 27.2 % — LOW (ref 39–50)
HCT VFR BLD CALC: 27.3 % — LOW (ref 39–50)
HCT VFR BLD CALC: 27.3 % — LOW (ref 39–50)
HCT VFR BLD CALC: 27.4 % — LOW (ref 39–50)
HCT VFR BLD CALC: 27.7 % — LOW (ref 39–50)
HCT VFR BLD CALC: 27.8 % — LOW (ref 39–50)
HCT VFR BLD CALC: 28 % — LOW (ref 39–50)
HCT VFR BLD CALC: 28 % — LOW (ref 39–50)
HCT VFR BLD CALC: 28.1 % — LOW (ref 39–50)
HCT VFR BLD CALC: 29 % — LOW (ref 39–50)
HCT VFR BLD CALC: 29 % — LOW (ref 39–50)
HCT VFR BLD CALC: 29.4 % — LOW (ref 39–50)
HCT VFR BLD CALC: 29.8 % — LOW (ref 39–50)
HCT VFR BLD CALC: 30.1 % — LOW (ref 39–50)
HCT VFR BLD CALC: 30.2 % — LOW (ref 39–50)
HCT VFR BLD CALC: 30.3 % — LOW (ref 39–50)
HCT VFR BLD CALC: 30.5 % — LOW (ref 39–50)
HCT VFR BLD CALC: 30.6 % — LOW (ref 39–50)
HCT VFR BLD CALC: 30.8 % — LOW (ref 39–50)
HCT VFR BLD CALC: 31 % — LOW (ref 39–50)
HCT VFR BLD CALC: 31 % — LOW (ref 39–50)
HCT VFR BLD CALC: 31.1 % — LOW (ref 39–50)
HCT VFR BLD CALC: 32.1 % — LOW (ref 39–50)
HCT VFR BLD CALC: 32.2 % — LOW (ref 39–50)
HCT VFR BLD CALC: 32.2 % — LOW (ref 39–50)
HCT VFR BLD CALC: 33.4 % — LOW (ref 39–50)
HCT VFR BLD CALC: 33.5 % — LOW (ref 39–50)
HCT VFR BLD CALC: 34.7 % — LOW (ref 39–50)
HCT VFR BLD CALC: 34.9 % — LOW (ref 39–50)
HCT VFR BLDA CALC: 23 % — LOW (ref 39–51)
HCT VFR BLDA CALC: 23 % — LOW (ref 39–51)
HCT VFR BLDA CALC: 24 % — LOW (ref 39–51)
HCT VFR BLDA CALC: 25 % — LOW (ref 39–51)
HCT VFR BLDA CALC: 27 % — LOW (ref 39–51)
HCV AB S/CO SERPL IA: 0.35 S/CO — SIGNIFICANT CHANGE UP (ref 0–0.99)
HCV AB S/CO SERPL IA: 0.36 S/CO — SIGNIFICANT CHANGE UP (ref 0–0.99)
HCV AB S/CO SERPL IA: 0.38 S/CO — SIGNIFICANT CHANGE UP (ref 0–0.99)
HCV AB SERPL-IMP: SIGNIFICANT CHANGE UP
HDLC SERPL-MCNC: 48 MG/DL — SIGNIFICANT CHANGE UP
HGB BLD CALC-MCNC: 7.5 G/DL — LOW (ref 12.6–17.4)
HGB BLD CALC-MCNC: 7.6 G/DL — LOW (ref 12.6–17.4)
HGB BLD CALC-MCNC: 7.9 G/DL — LOW (ref 12.6–17.4)
HGB BLD CALC-MCNC: 8.4 G/DL — LOW (ref 12.6–17.4)
HGB BLD CALC-MCNC: 9 G/DL — LOW (ref 12.6–17.4)
HGB BLD-MCNC: 10.1 G/DL — LOW (ref 13–17)
HGB BLD-MCNC: 10.4 G/DL — LOW (ref 13–17)
HGB BLD-MCNC: 10.5 G/DL — LOW (ref 13–17)
HGB BLD-MCNC: 6.1 G/DL — CRITICAL LOW (ref 13–17)
HGB BLD-MCNC: 6.3 G/DL — CRITICAL LOW (ref 13–17)
HGB BLD-MCNC: 6.5 G/DL — CRITICAL LOW (ref 13–17)
HGB BLD-MCNC: 6.5 G/DL — CRITICAL LOW (ref 13–17)
HGB BLD-MCNC: 6.6 G/DL — CRITICAL LOW (ref 13–17)
HGB BLD-MCNC: 6.7 G/DL — CRITICAL LOW (ref 13–17)
HGB BLD-MCNC: 6.7 G/DL — CRITICAL LOW (ref 13–17)
HGB BLD-MCNC: 6.8 G/DL — CRITICAL LOW (ref 13–17)
HGB BLD-MCNC: 6.8 G/DL — CRITICAL LOW (ref 13–17)
HGB BLD-MCNC: 6.9 G/DL — CRITICAL LOW (ref 13–17)
HGB BLD-MCNC: 6.9 G/DL — CRITICAL LOW (ref 13–17)
HGB BLD-MCNC: 7 G/DL — CRITICAL LOW (ref 13–17)
HGB BLD-MCNC: 7.1 G/DL — LOW (ref 13–17)
HGB BLD-MCNC: 7.2 G/DL — LOW (ref 13–17)
HGB BLD-MCNC: 7.3 G/DL — LOW (ref 13–17)
HGB BLD-MCNC: 7.4 G/DL — LOW (ref 13–17)
HGB BLD-MCNC: 7.4 G/DL — LOW (ref 13–17)
HGB BLD-MCNC: 7.5 G/DL — LOW (ref 13–17)
HGB BLD-MCNC: 7.6 G/DL — LOW (ref 13–17)
HGB BLD-MCNC: 7.6 G/DL — LOW (ref 13–17)
HGB BLD-MCNC: 7.7 G/DL — LOW (ref 13–17)
HGB BLD-MCNC: 7.8 G/DL — LOW (ref 13–17)
HGB BLD-MCNC: 7.9 G/DL — LOW (ref 13–17)
HGB BLD-MCNC: 8 G/DL — LOW (ref 13–17)
HGB BLD-MCNC: 8.1 G/DL — LOW (ref 13–17)
HGB BLD-MCNC: 8.2 G/DL — LOW (ref 13–17)
HGB BLD-MCNC: 8.3 G/DL — LOW (ref 13–17)
HGB BLD-MCNC: 8.3 G/DL — LOW (ref 13–17)
HGB BLD-MCNC: 8.4 G/DL — LOW (ref 13–17)
HGB BLD-MCNC: 8.5 G/DL — LOW (ref 13–17)
HGB BLD-MCNC: 8.6 G/DL — LOW (ref 13–17)
HGB BLD-MCNC: 8.9 G/DL — LOW (ref 13–17)
HGB BLD-MCNC: 8.9 G/DL — LOW (ref 13–17)
HGB BLD-MCNC: 9.1 G/DL — LOW (ref 13–17)
HGB BLD-MCNC: 9.3 G/DL — LOW (ref 13–17)
HGB BLD-MCNC: 9.3 G/DL — LOW (ref 13–17)
HGB BLD-MCNC: 9.4 G/DL — LOW (ref 13–17)
HGB BLD-MCNC: 9.6 G/DL — LOW (ref 13–17)
HGB BLD-MCNC: 9.8 G/DL — LOW (ref 13–17)
HGB BLD-MCNC: 9.8 G/DL — LOW (ref 13–17)
HGB BLD-MCNC: 9.9 G/DL — LOW (ref 13–17)
HOROWITZ INDEX BLDV+IHG-RTO: 28 — SIGNIFICANT CHANGE UP
HYALINE CASTS # UR AUTO: 0 /LPF — SIGNIFICANT CHANGE UP (ref 0–2)
IGA FLD-MCNC: 460 MG/DL — SIGNIFICANT CHANGE UP (ref 84–499)
IGD SER-MCNC: 5 MG/DL — SIGNIFICANT CHANGE UP
IGE SERPL-ACNC: 1333 KU/L — HIGH
IGG FLD-MCNC: 1813 MG/DL — HIGH (ref 610–1660)
IGG4 SER-MCNC: 142 MG/DL — HIGH (ref 2–96)
IGM SERPL-MCNC: 83 MG/DL — SIGNIFICANT CHANGE UP (ref 35–242)
IMM GRANULOCYTES NFR BLD AUTO: 0.3 % — SIGNIFICANT CHANGE UP (ref 0–1.5)
IMM GRANULOCYTES NFR BLD AUTO: 0.5 % — SIGNIFICANT CHANGE UP (ref 0–1.5)
IMM GRANULOCYTES NFR BLD AUTO: 0.5 % — SIGNIFICANT CHANGE UP (ref 0–1.5)
IMM GRANULOCYTES NFR BLD AUTO: 0.6 % — SIGNIFICANT CHANGE UP (ref 0–1.5)
IMM GRANULOCYTES NFR BLD AUTO: 0.7 % — SIGNIFICANT CHANGE UP (ref 0–1.5)
IMM GRANULOCYTES NFR BLD AUTO: 0.7 % — SIGNIFICANT CHANGE UP (ref 0–1.5)
IMM GRANULOCYTES NFR BLD AUTO: 0.8 % — SIGNIFICANT CHANGE UP (ref 0–1.5)
IMM GRANULOCYTES NFR BLD AUTO: 0.9 % — SIGNIFICANT CHANGE UP (ref 0–1.5)
IMM GRANULOCYTES NFR BLD AUTO: 0.9 % — SIGNIFICANT CHANGE UP (ref 0–1.5)
IMM GRANULOCYTES NFR BLD AUTO: 1 % — SIGNIFICANT CHANGE UP (ref 0–1.5)
IMM GRANULOCYTES NFR BLD AUTO: 1 % — SIGNIFICANT CHANGE UP (ref 0–1.5)
INR BLD: 1 RATIO — SIGNIFICANT CHANGE UP (ref 0.88–1.16)
INR BLD: 1.04 RATIO — SIGNIFICANT CHANGE UP (ref 0.88–1.16)
INR BLD: 1.04 RATIO — SIGNIFICANT CHANGE UP (ref 0.88–1.16)
INR BLD: 1.05 RATIO — SIGNIFICANT CHANGE UP (ref 0.88–1.16)
INR BLD: 1.06 RATIO — SIGNIFICANT CHANGE UP (ref 0.88–1.16)
INR BLD: 1.07 RATIO — SIGNIFICANT CHANGE UP (ref 0.88–1.16)
INR BLD: 1.08 RATIO — SIGNIFICANT CHANGE UP (ref 0.88–1.16)
INR BLD: 1.08 RATIO — SIGNIFICANT CHANGE UP (ref 0.88–1.16)
INR BLD: 1.09 RATIO — SIGNIFICANT CHANGE UP (ref 0.88–1.16)
INR BLD: 1.11 RATIO — SIGNIFICANT CHANGE UP (ref 0.88–1.16)
INR BLD: 1.12 RATIO — SIGNIFICANT CHANGE UP (ref 0.88–1.16)
INR BLD: 1.12 RATIO — SIGNIFICANT CHANGE UP (ref 0.88–1.16)
INR BLD: 1.15 RATIO — SIGNIFICANT CHANGE UP (ref 0.88–1.16)
INTERPRETATION SERPL IFE-IMP: SIGNIFICANT CHANGE UP
IRON SATN MFR SERPL: 15 % — LOW (ref 16–55)
IRON SATN MFR SERPL: 30 UG/DL — LOW (ref 45–165)
IRON SATN MFR SERPL: 39 % — SIGNIFICANT CHANGE UP (ref 16–55)
IRON SATN MFR SERPL: 72 UG/DL — SIGNIFICANT CHANGE UP (ref 45–165)
KAPPA LC SER QL IFE: 24.6 MG/DL — HIGH (ref 0.33–1.94)
KAPPA/LAMBDA FREE LIGHT CHAIN RATIO, SERUM: 0.85 RATIO — SIGNIFICANT CHANGE UP (ref 0.26–1.65)
KETONES UR-MCNC: NEGATIVE — SIGNIFICANT CHANGE UP
LA NT DPL PPP QL: 59.7 SEC — SIGNIFICANT CHANGE UP
LACTATE BLDV-MCNC: 1.1 MMOL/L — SIGNIFICANT CHANGE UP (ref 0.7–2)
LACTATE BLDV-MCNC: 1.3 MMOL/L — SIGNIFICANT CHANGE UP (ref 0.7–2)
LACTATE BLDV-MCNC: 1.5 MMOL/L — SIGNIFICANT CHANGE UP (ref 0.7–2)
LACTATE BLDV-MCNC: 1.5 MMOL/L — SIGNIFICANT CHANGE UP (ref 0.7–2)
LACTATE BLDV-MCNC: 2.2 MMOL/L — HIGH (ref 0.7–2)
LAMBDA LC SER QL IFE: 28.97 MG/DL — HIGH (ref 0.57–2.63)
LEUKOCYTE ESTERASE UR-ACNC: NEGATIVE — SIGNIFICANT CHANGE UP
LIPID PNL WITH DIRECT LDL SERPL: 36 MG/DL — SIGNIFICANT CHANGE UP
LYMPHOCYTES # BLD AUTO: 0.43 K/UL — LOW (ref 1–3.3)
LYMPHOCYTES # BLD AUTO: 0.44 K/UL — LOW (ref 1–3.3)
LYMPHOCYTES # BLD AUTO: 0.66 K/UL — LOW (ref 1–3.3)
LYMPHOCYTES # BLD AUTO: 0.69 K/UL — LOW (ref 1–3.3)
LYMPHOCYTES # BLD AUTO: 0.74 K/UL — LOW (ref 1–3.3)
LYMPHOCYTES # BLD AUTO: 0.75 K/UL — LOW (ref 1–3.3)
LYMPHOCYTES # BLD AUTO: 0.82 K/UL — LOW (ref 1–3.3)
LYMPHOCYTES # BLD AUTO: 0.94 K/UL — LOW (ref 1–3.3)
LYMPHOCYTES # BLD AUTO: 0.96 K/UL — LOW (ref 1–3.3)
LYMPHOCYTES # BLD AUTO: 0.98 K/UL — LOW (ref 1–3.3)
LYMPHOCYTES # BLD AUTO: 0.98 K/UL — LOW (ref 1–3.3)
LYMPHOCYTES # BLD AUTO: 1.17 K/UL — SIGNIFICANT CHANGE UP (ref 1–3.3)
LYMPHOCYTES # BLD AUTO: 1.27 K/UL — SIGNIFICANT CHANGE UP (ref 1–3.3)
LYMPHOCYTES # BLD AUTO: 1.27 K/UL — SIGNIFICANT CHANGE UP (ref 1–3.3)
LYMPHOCYTES # BLD AUTO: 1.32 K/UL — SIGNIFICANT CHANGE UP (ref 1–3.3)
LYMPHOCYTES # BLD AUTO: 1.39 K/UL — SIGNIFICANT CHANGE UP (ref 1–3.3)
LYMPHOCYTES # BLD AUTO: 1.53 K/UL — SIGNIFICANT CHANGE UP (ref 1–3.3)
LYMPHOCYTES # BLD AUTO: 1.76 K/UL — SIGNIFICANT CHANGE UP (ref 1–3.3)
LYMPHOCYTES # BLD AUTO: 11.6 % — LOW (ref 13–44)
LYMPHOCYTES # BLD AUTO: 12.5 % — LOW (ref 13–44)
LYMPHOCYTES # BLD AUTO: 13.8 % — SIGNIFICANT CHANGE UP (ref 13–44)
LYMPHOCYTES # BLD AUTO: 17.7 % — SIGNIFICANT CHANGE UP (ref 13–44)
LYMPHOCYTES # BLD AUTO: 2.6 % — LOW (ref 13–44)
LYMPHOCYTES # BLD AUTO: 2.6 % — LOW (ref 13–44)
LYMPHOCYTES # BLD AUTO: 4.1 % — LOW (ref 13–44)
LYMPHOCYTES # BLD AUTO: 4.3 % — LOW (ref 13–44)
LYMPHOCYTES # BLD AUTO: 4.4 % — LOW (ref 13–44)
LYMPHOCYTES # BLD AUTO: 5.2 % — LOW (ref 13–44)
LYMPHOCYTES # BLD AUTO: 5.3 % — LOW (ref 13–44)
LYMPHOCYTES # BLD AUTO: 5.4 % — LOW (ref 13–44)
LYMPHOCYTES # BLD AUTO: 5.5 % — LOW (ref 13–44)
LYMPHOCYTES # BLD AUTO: 5.8 % — LOW (ref 13–44)
LYMPHOCYTES # BLD AUTO: 6 % — LOW (ref 13–44)
LYMPHOCYTES # BLD AUTO: 6.6 % — LOW (ref 13–44)
LYMPHOCYTES # BLD AUTO: 7.3 % — LOW (ref 13–44)
LYMPHOCYTES # BLD AUTO: 8.5 % — LOW (ref 13–44)
M-SPIKE: 0.4 G/DL — HIGH (ref 0–0)
M-SPIKE: 0.5 G/DL — HIGH (ref 0–0)
MACROCYTES BLD QL: SLIGHT — SIGNIFICANT CHANGE UP
MAGNESIUM SERPL-MCNC: 1.9 MG/DL — SIGNIFICANT CHANGE UP (ref 1.6–2.6)
MAGNESIUM SERPL-MCNC: 2 MG/DL — SIGNIFICANT CHANGE UP (ref 1.6–2.6)
MAGNESIUM SERPL-MCNC: 2.1 MG/DL — SIGNIFICANT CHANGE UP (ref 1.6–2.6)
MAGNESIUM SERPL-MCNC: 2.2 MG/DL — SIGNIFICANT CHANGE UP (ref 1.6–2.6)
MAGNESIUM SERPL-MCNC: 2.3 MG/DL — SIGNIFICANT CHANGE UP (ref 1.6–2.6)
MAGNESIUM SERPL-MCNC: 2.3 MG/DL — SIGNIFICANT CHANGE UP (ref 1.6–2.6)
MAGNESIUM SERPL-MCNC: 2.4 MG/DL — SIGNIFICANT CHANGE UP (ref 1.6–2.6)
MAGNESIUM SERPL-MCNC: 2.5 MG/DL — SIGNIFICANT CHANGE UP (ref 1.6–2.6)
MAGNESIUM SERPL-MCNC: 2.6 MG/DL — SIGNIFICANT CHANGE UP (ref 1.6–2.6)
MAGNESIUM SERPL-MCNC: 2.6 MG/DL — SIGNIFICANT CHANGE UP (ref 1.6–2.6)
MANUAL SMEAR VERIFICATION: SIGNIFICANT CHANGE UP
MCHC RBC-ENTMCNC: 29.4 GM/DL — LOW (ref 32–36)
MCHC RBC-ENTMCNC: 29.6 GM/DL — LOW (ref 32–36)
MCHC RBC-ENTMCNC: 29.6 GM/DL — LOW (ref 32–36)
MCHC RBC-ENTMCNC: 29.7 GM/DL — LOW (ref 32–36)
MCHC RBC-ENTMCNC: 29.8 GM/DL — LOW (ref 32–36)
MCHC RBC-ENTMCNC: 29.9 GM/DL — LOW (ref 32–36)
MCHC RBC-ENTMCNC: 30 GM/DL — LOW (ref 32–36)
MCHC RBC-ENTMCNC: 30.1 GM/DL — LOW (ref 32–36)
MCHC RBC-ENTMCNC: 30.2 GM/DL — LOW (ref 32–36)
MCHC RBC-ENTMCNC: 30.2 PG — SIGNIFICANT CHANGE UP (ref 27–34)
MCHC RBC-ENTMCNC: 30.3 GM/DL — LOW (ref 32–36)
MCHC RBC-ENTMCNC: 30.3 PG — SIGNIFICANT CHANGE UP (ref 27–34)
MCHC RBC-ENTMCNC: 30.4 GM/DL — LOW (ref 32–36)
MCHC RBC-ENTMCNC: 30.4 PG — SIGNIFICANT CHANGE UP (ref 27–34)
MCHC RBC-ENTMCNC: 30.5 GM/DL — LOW (ref 32–36)
MCHC RBC-ENTMCNC: 30.5 PG — SIGNIFICANT CHANGE UP (ref 27–34)
MCHC RBC-ENTMCNC: 30.5 PG — SIGNIFICANT CHANGE UP (ref 27–34)
MCHC RBC-ENTMCNC: 30.6 GM/DL — LOW (ref 32–36)
MCHC RBC-ENTMCNC: 30.6 PG — SIGNIFICANT CHANGE UP (ref 27–34)
MCHC RBC-ENTMCNC: 30.7 GM/DL — LOW (ref 32–36)
MCHC RBC-ENTMCNC: 30.7 PG — SIGNIFICANT CHANGE UP (ref 27–34)
MCHC RBC-ENTMCNC: 30.8 GM/DL — LOW (ref 32–36)
MCHC RBC-ENTMCNC: 30.8 PG — SIGNIFICANT CHANGE UP (ref 27–34)
MCHC RBC-ENTMCNC: 30.9 GM/DL — LOW (ref 32–36)
MCHC RBC-ENTMCNC: 30.9 PG — SIGNIFICANT CHANGE UP (ref 27–34)
MCHC RBC-ENTMCNC: 31 GM/DL — LOW (ref 32–36)
MCHC RBC-ENTMCNC: 31 PG — SIGNIFICANT CHANGE UP (ref 27–34)
MCHC RBC-ENTMCNC: 31.1 GM/DL — LOW (ref 32–36)
MCHC RBC-ENTMCNC: 31.1 PG — SIGNIFICANT CHANGE UP (ref 27–34)
MCHC RBC-ENTMCNC: 31.2 GM/DL — LOW (ref 32–36)
MCHC RBC-ENTMCNC: 31.2 PG — SIGNIFICANT CHANGE UP (ref 27–34)
MCHC RBC-ENTMCNC: 31.3 PG — SIGNIFICANT CHANGE UP (ref 27–34)
MCHC RBC-ENTMCNC: 31.4 GM/DL — LOW (ref 32–36)
MCHC RBC-ENTMCNC: 31.4 PG — SIGNIFICANT CHANGE UP (ref 27–34)
MCHC RBC-ENTMCNC: 31.5 PG — SIGNIFICANT CHANGE UP (ref 27–34)
MCHC RBC-ENTMCNC: 31.5 PG — SIGNIFICANT CHANGE UP (ref 27–34)
MCHC RBC-ENTMCNC: 31.6 PG — SIGNIFICANT CHANGE UP (ref 27–34)
MCHC RBC-ENTMCNC: 31.7 PG — SIGNIFICANT CHANGE UP (ref 27–34)
MCHC RBC-ENTMCNC: 31.8 GM/DL — LOW (ref 32–36)
MCHC RBC-ENTMCNC: 31.8 PG — SIGNIFICANT CHANGE UP (ref 27–34)
MCHC RBC-ENTMCNC: 31.9 PG — SIGNIFICANT CHANGE UP (ref 27–34)
MCHC RBC-ENTMCNC: 32.1 GM/DL — SIGNIFICANT CHANGE UP (ref 32–36)
MCHC RBC-ENTMCNC: 32.1 PG — SIGNIFICANT CHANGE UP (ref 27–34)
MCHC RBC-ENTMCNC: 32.2 GM/DL — SIGNIFICANT CHANGE UP (ref 32–36)
MCHC RBC-ENTMCNC: 32.2 GM/DL — SIGNIFICANT CHANGE UP (ref 32–36)
MCHC RBC-ENTMCNC: 32.2 PG — SIGNIFICANT CHANGE UP (ref 27–34)
MCHC RBC-ENTMCNC: 32.3 GM/DL — SIGNIFICANT CHANGE UP (ref 32–36)
MCHC RBC-ENTMCNC: 32.4 GM/DL — SIGNIFICANT CHANGE UP (ref 32–36)
MCHC RBC-ENTMCNC: 32.5 GM/DL — SIGNIFICANT CHANGE UP (ref 32–36)
MCHC RBC-ENTMCNC: 32.6 GM/DL — SIGNIFICANT CHANGE UP (ref 32–36)
MCHC RBC-ENTMCNC: 32.7 GM/DL — SIGNIFICANT CHANGE UP (ref 32–36)
MCHC RBC-ENTMCNC: 32.8 GM/DL — SIGNIFICANT CHANGE UP (ref 32–36)
MCHC RBC-ENTMCNC: 32.8 GM/DL — SIGNIFICANT CHANGE UP (ref 32–36)
MCHC RBC-ENTMCNC: 32.9 GM/DL — SIGNIFICANT CHANGE UP (ref 32–36)
MCHC RBC-ENTMCNC: 33 GM/DL — SIGNIFICANT CHANGE UP (ref 32–36)
MCHC RBC-ENTMCNC: 33.2 GM/DL — SIGNIFICANT CHANGE UP (ref 32–36)
MCHC RBC-ENTMCNC: 33.3 GM/DL — SIGNIFICANT CHANGE UP (ref 32–36)
MCHC RBC-ENTMCNC: 33.3 GM/DL — SIGNIFICANT CHANGE UP (ref 32–36)
MCHC RBC-ENTMCNC: 33.9 GM/DL — SIGNIFICANT CHANGE UP (ref 32–36)
MCHC RBC-ENTMCNC: 34 GM/DL — SIGNIFICANT CHANGE UP (ref 32–36)
MCHC RBC-ENTMCNC: 34 GM/DL — SIGNIFICANT CHANGE UP (ref 32–36)
MCV RBC AUTO: 100 FL — SIGNIFICANT CHANGE UP (ref 80–100)
MCV RBC AUTO: 100.4 FL — HIGH (ref 80–100)
MCV RBC AUTO: 100.6 FL — HIGH (ref 80–100)
MCV RBC AUTO: 100.8 FL — HIGH (ref 80–100)
MCV RBC AUTO: 101 FL — HIGH (ref 80–100)
MCV RBC AUTO: 101 FL — HIGH (ref 80–100)
MCV RBC AUTO: 101.2 FL — HIGH (ref 80–100)
MCV RBC AUTO: 101.4 FL — HIGH (ref 80–100)
MCV RBC AUTO: 101.5 FL — HIGH (ref 80–100)
MCV RBC AUTO: 101.5 FL — HIGH (ref 80–100)
MCV RBC AUTO: 101.7 FL — HIGH (ref 80–100)
MCV RBC AUTO: 102 FL — HIGH (ref 80–100)
MCV RBC AUTO: 102.1 FL — HIGH (ref 80–100)
MCV RBC AUTO: 102.2 FL — HIGH (ref 80–100)
MCV RBC AUTO: 102.2 FL — HIGH (ref 80–100)
MCV RBC AUTO: 102.4 FL — HIGH (ref 80–100)
MCV RBC AUTO: 102.4 FL — HIGH (ref 80–100)
MCV RBC AUTO: 102.5 FL — HIGH (ref 80–100)
MCV RBC AUTO: 103.1 FL — HIGH (ref 80–100)
MCV RBC AUTO: 103.4 FL — HIGH (ref 80–100)
MCV RBC AUTO: 103.4 FL — HIGH (ref 80–100)
MCV RBC AUTO: 103.5 FL — HIGH (ref 80–100)
MCV RBC AUTO: 103.7 FL — HIGH (ref 80–100)
MCV RBC AUTO: 103.8 FL — HIGH (ref 80–100)
MCV RBC AUTO: 104 FL — HIGH (ref 80–100)
MCV RBC AUTO: 104.5 FL — HIGH (ref 80–100)
MCV RBC AUTO: 105.1 FL — HIGH (ref 80–100)
MCV RBC AUTO: 105.1 FL — HIGH (ref 80–100)
MCV RBC AUTO: 105.4 FL — HIGH (ref 80–100)
MCV RBC AUTO: 106.4 FL — HIGH (ref 80–100)
MCV RBC AUTO: 106.4 FL — HIGH (ref 80–100)
MCV RBC AUTO: 91.5 FL — SIGNIFICANT CHANGE UP (ref 80–100)
MCV RBC AUTO: 91.9 FL — SIGNIFICANT CHANGE UP (ref 80–100)
MCV RBC AUTO: 92.2 FL — SIGNIFICANT CHANGE UP (ref 80–100)
MCV RBC AUTO: 92.3 FL — SIGNIFICANT CHANGE UP (ref 80–100)
MCV RBC AUTO: 92.3 FL — SIGNIFICANT CHANGE UP (ref 80–100)
MCV RBC AUTO: 93.2 FL — SIGNIFICANT CHANGE UP (ref 80–100)
MCV RBC AUTO: 93.3 FL — SIGNIFICANT CHANGE UP (ref 80–100)
MCV RBC AUTO: 93.4 FL — SIGNIFICANT CHANGE UP (ref 80–100)
MCV RBC AUTO: 94.2 FL — SIGNIFICANT CHANGE UP (ref 80–100)
MCV RBC AUTO: 94.4 FL — SIGNIFICANT CHANGE UP (ref 80–100)
MCV RBC AUTO: 95 FL — SIGNIFICANT CHANGE UP (ref 80–100)
MCV RBC AUTO: 95.1 FL — SIGNIFICANT CHANGE UP (ref 80–100)
MCV RBC AUTO: 95.5 FL — SIGNIFICANT CHANGE UP (ref 80–100)
MCV RBC AUTO: 96 FL — SIGNIFICANT CHANGE UP (ref 80–100)
MCV RBC AUTO: 96.4 FL — SIGNIFICANT CHANGE UP (ref 80–100)
MCV RBC AUTO: 96.5 FL — SIGNIFICANT CHANGE UP (ref 80–100)
MCV RBC AUTO: 97.1 FL — SIGNIFICANT CHANGE UP (ref 80–100)
MCV RBC AUTO: 97.2 FL — SIGNIFICANT CHANGE UP (ref 80–100)
MCV RBC AUTO: 97.3 FL — SIGNIFICANT CHANGE UP (ref 80–100)
MCV RBC AUTO: 98.3 FL — SIGNIFICANT CHANGE UP (ref 80–100)
MCV RBC AUTO: 98.3 FL — SIGNIFICANT CHANGE UP (ref 80–100)
MCV RBC AUTO: 98.4 FL — SIGNIFICANT CHANGE UP (ref 80–100)
MCV RBC AUTO: 98.4 FL — SIGNIFICANT CHANGE UP (ref 80–100)
MCV RBC AUTO: 98.5 FL — SIGNIFICANT CHANGE UP (ref 80–100)
MCV RBC AUTO: 98.7 FL — SIGNIFICANT CHANGE UP (ref 80–100)
MCV RBC AUTO: 98.7 FL — SIGNIFICANT CHANGE UP (ref 80–100)
MCV RBC AUTO: 98.9 FL — SIGNIFICANT CHANGE UP (ref 80–100)
MCV RBC AUTO: 99 FL — SIGNIFICANT CHANGE UP (ref 80–100)
MCV RBC AUTO: 99.1 FL — SIGNIFICANT CHANGE UP (ref 80–100)
MCV RBC AUTO: 99.2 FL — SIGNIFICANT CHANGE UP (ref 80–100)
MCV RBC AUTO: 99.3 FL — SIGNIFICANT CHANGE UP (ref 80–100)
MCV RBC AUTO: 99.6 FL — SIGNIFICANT CHANGE UP (ref 80–100)
MCV RBC AUTO: 99.7 FL — SIGNIFICANT CHANGE UP (ref 80–100)
MCV RBC AUTO: 99.7 FL — SIGNIFICANT CHANGE UP (ref 80–100)
METHOD TYPE: SIGNIFICANT CHANGE UP
MICROCYTES BLD QL: SLIGHT — SIGNIFICANT CHANGE UP
MICROCYTES BLD QL: SLIGHT — SIGNIFICANT CHANGE UP
MONOCYTES # BLD AUTO: 0.58 K/UL — SIGNIFICANT CHANGE UP (ref 0–0.9)
MONOCYTES # BLD AUTO: 0.76 K/UL — SIGNIFICANT CHANGE UP (ref 0–0.9)
MONOCYTES # BLD AUTO: 0.8 K/UL — SIGNIFICANT CHANGE UP (ref 0–0.9)
MONOCYTES # BLD AUTO: 0.89 K/UL — SIGNIFICANT CHANGE UP (ref 0–0.9)
MONOCYTES # BLD AUTO: 0.89 K/UL — SIGNIFICANT CHANGE UP (ref 0–0.9)
MONOCYTES # BLD AUTO: 0.94 K/UL — HIGH (ref 0–0.9)
MONOCYTES # BLD AUTO: 0.95 K/UL — HIGH (ref 0–0.9)
MONOCYTES # BLD AUTO: 1.15 K/UL — HIGH (ref 0–0.9)
MONOCYTES # BLD AUTO: 1.17 K/UL — HIGH (ref 0–0.9)
MONOCYTES # BLD AUTO: 1.22 K/UL — HIGH (ref 0–0.9)
MONOCYTES # BLD AUTO: 1.23 K/UL — HIGH (ref 0–0.9)
MONOCYTES # BLD AUTO: 1.25 K/UL — HIGH (ref 0–0.9)
MONOCYTES # BLD AUTO: 1.38 K/UL — HIGH (ref 0–0.9)
MONOCYTES # BLD AUTO: 1.39 K/UL — HIGH (ref 0–0.9)
MONOCYTES # BLD AUTO: 1.44 K/UL — HIGH (ref 0–0.9)
MONOCYTES # BLD AUTO: 1.61 K/UL — HIGH (ref 0–0.9)
MONOCYTES # BLD AUTO: 1.69 K/UL — HIGH (ref 0–0.9)
MONOCYTES # BLD AUTO: 1.98 K/UL — HIGH (ref 0–0.9)
MONOCYTES NFR BLD AUTO: 3.5 % — SIGNIFICANT CHANGE UP (ref 2–14)
MONOCYTES NFR BLD AUTO: 5.2 % — SIGNIFICANT CHANGE UP (ref 2–14)
MONOCYTES NFR BLD AUTO: 5.2 % — SIGNIFICANT CHANGE UP (ref 2–14)
MONOCYTES NFR BLD AUTO: 6.7 % — SIGNIFICANT CHANGE UP (ref 2–14)
MONOCYTES NFR BLD AUTO: 6.7 % — SIGNIFICANT CHANGE UP (ref 2–14)
MONOCYTES NFR BLD AUTO: 6.9 % — SIGNIFICANT CHANGE UP (ref 2–14)
MONOCYTES NFR BLD AUTO: 7 % — SIGNIFICANT CHANGE UP (ref 2–14)
MONOCYTES NFR BLD AUTO: 7.1 % — SIGNIFICANT CHANGE UP (ref 2–14)
MONOCYTES NFR BLD AUTO: 8.3 % — SIGNIFICANT CHANGE UP (ref 2–14)
MONOCYTES NFR BLD AUTO: 8.5 % — SIGNIFICANT CHANGE UP (ref 2–14)
MONOCYTES NFR BLD AUTO: 9 % — SIGNIFICANT CHANGE UP (ref 2–14)
MONOCYTES NFR BLD AUTO: 9.2 % — SIGNIFICANT CHANGE UP (ref 2–14)
MONOCYTES NFR BLD AUTO: 9.3 % — SIGNIFICANT CHANGE UP (ref 2–14)
MONOCYTES NFR BLD AUTO: 9.4 % — SIGNIFICANT CHANGE UP (ref 2–14)
MONOCYTES NFR BLD AUTO: 9.4 % — SIGNIFICANT CHANGE UP (ref 2–14)
MONOCYTES NFR BLD AUTO: 9.5 % — SIGNIFICANT CHANGE UP (ref 2–14)
MONOCYTES NFR BLD AUTO: 9.6 % — SIGNIFICANT CHANGE UP (ref 2–14)
MONOCYTES NFR BLD AUTO: 9.7 % — SIGNIFICANT CHANGE UP (ref 2–14)
MRSA PCR RESULT.: SIGNIFICANT CHANGE UP
MYELOCYTES NFR BLD: 0.9 % — HIGH (ref 0–0)
NEUTROPHILS # BLD AUTO: 10.28 K/UL — HIGH (ref 1.8–7.4)
NEUTROPHILS # BLD AUTO: 10.65 K/UL — HIGH (ref 1.8–7.4)
NEUTROPHILS # BLD AUTO: 10.65 K/UL — HIGH (ref 1.8–7.4)
NEUTROPHILS # BLD AUTO: 11.02 K/UL — HIGH (ref 1.8–7.4)
NEUTROPHILS # BLD AUTO: 12.05 K/UL — HIGH (ref 1.8–7.4)
NEUTROPHILS # BLD AUTO: 12.08 K/UL — HIGH (ref 1.8–7.4)
NEUTROPHILS # BLD AUTO: 14.18 K/UL — HIGH (ref 1.8–7.4)
NEUTROPHILS # BLD AUTO: 14.19 K/UL — HIGH (ref 1.8–7.4)
NEUTROPHILS # BLD AUTO: 14.32 K/UL — HIGH (ref 1.8–7.4)
NEUTROPHILS # BLD AUTO: 14.87 K/UL — HIGH (ref 1.8–7.4)
NEUTROPHILS # BLD AUTO: 15.05 K/UL — HIGH (ref 1.8–7.4)
NEUTROPHILS # BLD AUTO: 15.21 K/UL — HIGH (ref 1.8–7.4)
NEUTROPHILS # BLD AUTO: 15.39 K/UL — HIGH (ref 1.8–7.4)
NEUTROPHILS # BLD AUTO: 15.51 K/UL — HIGH (ref 1.8–7.4)
NEUTROPHILS # BLD AUTO: 17.14 K/UL — HIGH (ref 1.8–7.4)
NEUTROPHILS # BLD AUTO: 18.45 K/UL — HIGH (ref 1.8–7.4)
NEUTROPHILS # BLD AUTO: 5.35 K/UL — SIGNIFICANT CHANGE UP (ref 1.8–7.4)
NEUTROPHILS # BLD AUTO: 6.19 K/UL — SIGNIFICANT CHANGE UP (ref 1.8–7.4)
NEUTROPHILS NFR BLD AUTO: 67.9 % — SIGNIFICANT CHANGE UP (ref 43–77)
NEUTROPHILS NFR BLD AUTO: 73 % — SIGNIFICANT CHANGE UP (ref 43–77)
NEUTROPHILS NFR BLD AUTO: 78.2 % — HIGH (ref 43–77)
NEUTROPHILS NFR BLD AUTO: 78.2 % — HIGH (ref 43–77)
NEUTROPHILS NFR BLD AUTO: 80.5 % — HIGH (ref 43–77)
NEUTROPHILS NFR BLD AUTO: 80.6 % — HIGH (ref 43–77)
NEUTROPHILS NFR BLD AUTO: 81.5 % — HIGH (ref 43–77)
NEUTROPHILS NFR BLD AUTO: 81.8 % — HIGH (ref 43–77)
NEUTROPHILS NFR BLD AUTO: 83 % — HIGH (ref 43–77)
NEUTROPHILS NFR BLD AUTO: 83.5 % — HIGH (ref 43–77)
NEUTROPHILS NFR BLD AUTO: 84 % — HIGH (ref 43–77)
NEUTROPHILS NFR BLD AUTO: 84.4 % — HIGH (ref 43–77)
NEUTROPHILS NFR BLD AUTO: 85 % — HIGH (ref 43–77)
NEUTROPHILS NFR BLD AUTO: 85.2 % — HIGH (ref 43–77)
NEUTROPHILS NFR BLD AUTO: 86.5 % — HIGH (ref 43–77)
NEUTROPHILS NFR BLD AUTO: 88.8 % — HIGH (ref 43–77)
NEUTROPHILS NFR BLD AUTO: 90.4 % — HIGH (ref 43–77)
NEUTROPHILS NFR BLD AUTO: 93.9 % — HIGH (ref 43–77)
NEUTS BAND # BLD: 1 % — SIGNIFICANT CHANGE UP (ref 0–8)
NITRITE UR-MCNC: NEGATIVE — SIGNIFICANT CHANGE UP
NON HDL CHOLESTEROL: 43 MG/DL — SIGNIFICANT CHANGE UP
NORMALIZED SCT PPP-RTO: 1.49 RATIO — HIGH (ref 0–1.16)
NORMALIZED SCT PPP-RTO: ABNORMAL
NRBC # BLD: 0 /100 WBCS — SIGNIFICANT CHANGE UP (ref 0–0)
NRBC # BLD: 1 /100 WBCS — HIGH (ref 0–0)
NRBC # BLD: 1 /100 — HIGH (ref 0–0)
NRBC # BLD: 15 /100 — HIGH (ref 0–0)
NRBC # BLD: 2 /100 WBCS — HIGH (ref 0–0)
NRBC # BLD: 2 /100 — HIGH (ref 0–0)
NRBC # BLD: 2 /100 — HIGH (ref 0–0)
NRBC # BLD: 3 /100 WBCS — HIGH (ref 0–0)
NRBC # BLD: 4 /100 WBCS — HIGH (ref 0–0)
NRBC # BLD: 5 /100 WBCS — HIGH (ref 0–0)
NT-PROBNP SERPL-SCNC: HIGH PG/ML (ref 0–300)
NT-PROBNP SERPL-SCNC: HIGH PG/ML (ref 0–300)
ORGANISM # SPEC MICROSCOPIC CNT: SIGNIFICANT CHANGE UP
ORGANISM # SPEC MICROSCOPIC CNT: SIGNIFICANT CHANGE UP
OVALOCYTES BLD QL SMEAR: SLIGHT — SIGNIFICANT CHANGE UP
OVALOCYTES BLD QL SMEAR: SLIGHT — SIGNIFICANT CHANGE UP
PAT CTL 2H: 59.8 SEC — HIGH (ref 27.5–36.5)
PCO2 BLDV: 44 MMHG — SIGNIFICANT CHANGE UP (ref 42–55)
PCO2 BLDV: 45 MMHG — SIGNIFICANT CHANGE UP (ref 42–55)
PCO2 BLDV: 45 MMHG — SIGNIFICANT CHANGE UP (ref 42–55)
PCO2 BLDV: 47 MMHG — SIGNIFICANT CHANGE UP (ref 42–55)
PCO2 BLDV: 50 MMHG — SIGNIFICANT CHANGE UP (ref 42–55)
PCO2 BLDV: 51 MMHG — SIGNIFICANT CHANGE UP (ref 42–55)
PH BLDV: 7.34 — SIGNIFICANT CHANGE UP (ref 7.32–7.43)
PH BLDV: 7.34 — SIGNIFICANT CHANGE UP (ref 7.32–7.43)
PH BLDV: 7.35 — SIGNIFICANT CHANGE UP (ref 7.32–7.43)
PH BLDV: 7.36 — SIGNIFICANT CHANGE UP (ref 7.32–7.43)
PH BLDV: 7.37 — SIGNIFICANT CHANGE UP (ref 7.32–7.43)
PH BLDV: 7.39 — SIGNIFICANT CHANGE UP (ref 7.32–7.43)
PH UR: 5.5 — SIGNIFICANT CHANGE UP (ref 5–8)
PHOSPHATE SERPL-MCNC: 3.3 MG/DL — SIGNIFICANT CHANGE UP (ref 2.5–4.5)
PHOSPHATE SERPL-MCNC: 3.8 MG/DL — SIGNIFICANT CHANGE UP (ref 2.5–4.5)
PHOSPHATE SERPL-MCNC: 3.9 MG/DL — SIGNIFICANT CHANGE UP (ref 2.5–4.5)
PHOSPHATE SERPL-MCNC: 4.1 MG/DL — SIGNIFICANT CHANGE UP (ref 2.5–4.5)
PHOSPHATE SERPL-MCNC: 4.1 MG/DL — SIGNIFICANT CHANGE UP (ref 2.5–4.5)
PHOSPHATE SERPL-MCNC: 4.2 MG/DL — SIGNIFICANT CHANGE UP (ref 2.5–4.5)
PHOSPHATE SERPL-MCNC: 4.3 MG/DL — SIGNIFICANT CHANGE UP (ref 2.5–4.5)
PHOSPHATE SERPL-MCNC: 4.4 MG/DL — SIGNIFICANT CHANGE UP (ref 2.5–4.5)
PHOSPHATE SERPL-MCNC: 4.5 MG/DL — SIGNIFICANT CHANGE UP (ref 2.5–4.5)
PHOSPHATE SERPL-MCNC: 4.6 MG/DL — HIGH (ref 2.5–4.5)
PHOSPHATE SERPL-MCNC: 5.1 MG/DL — HIGH (ref 2.5–4.5)
PHOSPHATE SERPL-MCNC: 5.3 MG/DL — HIGH (ref 2.5–4.5)
PHOSPHATE SERPL-MCNC: 5.6 MG/DL — HIGH (ref 2.5–4.5)
PHOSPHATE SERPL-MCNC: 5.8 MG/DL — HIGH (ref 2.5–4.5)
PHOSPHATE SERPL-MCNC: 5.9 MG/DL — HIGH (ref 2.5–4.5)
PHOSPHATE SERPL-MCNC: 6.1 MG/DL — HIGH (ref 2.5–4.5)
PHOSPHATE SERPL-MCNC: 6.2 MG/DL — HIGH (ref 2.5–4.5)
PHOSPHATE SERPL-MCNC: 6.2 MG/DL — HIGH (ref 2.5–4.5)
PHOSPHATE SERPL-MCNC: 6.3 MG/DL — HIGH (ref 2.5–4.5)
PHOSPHATE SERPL-MCNC: 6.5 MG/DL — HIGH (ref 2.5–4.5)
PHOSPHATE SERPL-MCNC: 6.5 MG/DL — HIGH (ref 2.5–4.5)
PHOSPHATE SERPL-MCNC: 6.7 MG/DL — HIGH (ref 2.5–4.5)
PHOSPHATE SERPL-MCNC: 6.8 MG/DL — HIGH (ref 2.5–4.5)
PHOSPHATE SERPL-MCNC: 6.9 MG/DL — HIGH (ref 2.5–4.5)
PHOSPHATE SERPL-MCNC: 7 MG/DL — HIGH (ref 2.5–4.5)
PHOSPHATE SERPL-MCNC: 7.2 MG/DL — HIGH (ref 2.5–4.5)
PHOSPHATE SERPL-MCNC: 7.3 MG/DL — HIGH (ref 2.5–4.5)
PHOSPHATE SERPL-MCNC: 7.4 MG/DL — HIGH (ref 2.5–4.5)
PLAT MORPH BLD: NORMAL — SIGNIFICANT CHANGE UP
PLATELET # BLD AUTO: 122 K/UL — LOW (ref 150–400)
PLATELET # BLD AUTO: 123 K/UL — LOW (ref 150–400)
PLATELET # BLD AUTO: 124 K/UL — LOW (ref 150–400)
PLATELET # BLD AUTO: 140 K/UL — LOW (ref 150–400)
PLATELET # BLD AUTO: 149 K/UL — LOW (ref 150–400)
PLATELET # BLD AUTO: 151 K/UL — SIGNIFICANT CHANGE UP (ref 150–400)
PLATELET # BLD AUTO: 155 K/UL — SIGNIFICANT CHANGE UP (ref 150–400)
PLATELET # BLD AUTO: 162 K/UL — SIGNIFICANT CHANGE UP (ref 150–400)
PLATELET # BLD AUTO: 173 K/UL — SIGNIFICANT CHANGE UP (ref 150–400)
PLATELET # BLD AUTO: 174 K/UL — SIGNIFICANT CHANGE UP (ref 150–400)
PLATELET # BLD AUTO: 177 K/UL — SIGNIFICANT CHANGE UP (ref 150–400)
PLATELET # BLD AUTO: 180 K/UL — SIGNIFICANT CHANGE UP (ref 150–400)
PLATELET # BLD AUTO: 183 K/UL — SIGNIFICANT CHANGE UP (ref 150–400)
PLATELET # BLD AUTO: 185 K/UL — SIGNIFICANT CHANGE UP (ref 150–400)
PLATELET # BLD AUTO: 191 K/UL — SIGNIFICANT CHANGE UP (ref 150–400)
PLATELET # BLD AUTO: 193 K/UL — SIGNIFICANT CHANGE UP (ref 150–400)
PLATELET # BLD AUTO: 194 K/UL — SIGNIFICANT CHANGE UP (ref 150–400)
PLATELET # BLD AUTO: 194 K/UL — SIGNIFICANT CHANGE UP (ref 150–400)
PLATELET # BLD AUTO: 195 K/UL — SIGNIFICANT CHANGE UP (ref 150–400)
PLATELET # BLD AUTO: 196 K/UL — SIGNIFICANT CHANGE UP (ref 150–400)
PLATELET # BLD AUTO: 197 K/UL — SIGNIFICANT CHANGE UP (ref 150–400)
PLATELET # BLD AUTO: 197 K/UL — SIGNIFICANT CHANGE UP (ref 150–400)
PLATELET # BLD AUTO: 198 K/UL — SIGNIFICANT CHANGE UP (ref 150–400)
PLATELET # BLD AUTO: 200 K/UL — SIGNIFICANT CHANGE UP (ref 150–400)
PLATELET # BLD AUTO: 203 K/UL — SIGNIFICANT CHANGE UP (ref 150–400)
PLATELET # BLD AUTO: 210 K/UL — SIGNIFICANT CHANGE UP (ref 150–400)
PLATELET # BLD AUTO: 210 K/UL — SIGNIFICANT CHANGE UP (ref 150–400)
PLATELET # BLD AUTO: 211 K/UL — SIGNIFICANT CHANGE UP (ref 150–400)
PLATELET # BLD AUTO: 214 K/UL — SIGNIFICANT CHANGE UP (ref 150–400)
PLATELET # BLD AUTO: 214 K/UL — SIGNIFICANT CHANGE UP (ref 150–400)
PLATELET # BLD AUTO: 217 K/UL — SIGNIFICANT CHANGE UP (ref 150–400)
PLATELET # BLD AUTO: 219 K/UL — SIGNIFICANT CHANGE UP (ref 150–400)
PLATELET # BLD AUTO: 221 K/UL — SIGNIFICANT CHANGE UP (ref 150–400)
PLATELET # BLD AUTO: 221 K/UL — SIGNIFICANT CHANGE UP (ref 150–400)
PLATELET # BLD AUTO: 224 K/UL — SIGNIFICANT CHANGE UP (ref 150–400)
PLATELET # BLD AUTO: 224 K/UL — SIGNIFICANT CHANGE UP (ref 150–400)
PLATELET # BLD AUTO: 225 K/UL — SIGNIFICANT CHANGE UP (ref 150–400)
PLATELET # BLD AUTO: 226 K/UL — SIGNIFICANT CHANGE UP (ref 150–400)
PLATELET # BLD AUTO: 228 K/UL — SIGNIFICANT CHANGE UP (ref 150–400)
PLATELET # BLD AUTO: 229 K/UL — SIGNIFICANT CHANGE UP (ref 150–400)
PLATELET # BLD AUTO: 230 K/UL — SIGNIFICANT CHANGE UP (ref 150–400)
PLATELET # BLD AUTO: 232 K/UL — SIGNIFICANT CHANGE UP (ref 150–400)
PLATELET # BLD AUTO: 235 K/UL — SIGNIFICANT CHANGE UP (ref 150–400)
PLATELET # BLD AUTO: 235 K/UL — SIGNIFICANT CHANGE UP (ref 150–400)
PLATELET # BLD AUTO: 236 K/UL — SIGNIFICANT CHANGE UP (ref 150–400)
PLATELET # BLD AUTO: 238 K/UL — SIGNIFICANT CHANGE UP (ref 150–400)
PLATELET # BLD AUTO: 238 K/UL — SIGNIFICANT CHANGE UP (ref 150–400)
PLATELET # BLD AUTO: 239 K/UL — SIGNIFICANT CHANGE UP (ref 150–400)
PLATELET # BLD AUTO: 241 K/UL — SIGNIFICANT CHANGE UP (ref 150–400)
PLATELET # BLD AUTO: 243 K/UL — SIGNIFICANT CHANGE UP (ref 150–400)
PLATELET # BLD AUTO: 244 K/UL — SIGNIFICANT CHANGE UP (ref 150–400)
PLATELET # BLD AUTO: 244 K/UL — SIGNIFICANT CHANGE UP (ref 150–400)
PLATELET # BLD AUTO: 245 K/UL — SIGNIFICANT CHANGE UP (ref 150–400)
PLATELET # BLD AUTO: 246 K/UL — SIGNIFICANT CHANGE UP (ref 150–400)
PLATELET # BLD AUTO: 255 K/UL — SIGNIFICANT CHANGE UP (ref 150–400)
PLATELET # BLD AUTO: 259 K/UL — SIGNIFICANT CHANGE UP (ref 150–400)
PLATELET # BLD AUTO: 263 K/UL — SIGNIFICANT CHANGE UP (ref 150–400)
PLATELET # BLD AUTO: 269 K/UL — SIGNIFICANT CHANGE UP (ref 150–400)
PLATELET # BLD AUTO: 282 K/UL — SIGNIFICANT CHANGE UP (ref 150–400)
PLATELET # BLD AUTO: 287 K/UL — SIGNIFICANT CHANGE UP (ref 150–400)
PLATELET # BLD AUTO: 299 K/UL — SIGNIFICANT CHANGE UP (ref 150–400)
PLATELET # BLD AUTO: 301 K/UL — SIGNIFICANT CHANGE UP (ref 150–400)
PLATELET # BLD AUTO: 304 K/UL — SIGNIFICANT CHANGE UP (ref 150–400)
PLATELET # BLD AUTO: 314 K/UL — SIGNIFICANT CHANGE UP (ref 150–400)
PLATELET # BLD AUTO: 320 K/UL — SIGNIFICANT CHANGE UP (ref 150–400)
PLATELET # BLD AUTO: 329 K/UL — SIGNIFICANT CHANGE UP (ref 150–400)
PLATELET # BLD AUTO: 342 K/UL — SIGNIFICANT CHANGE UP (ref 150–400)
PLATELET # BLD AUTO: 354 K/UL — SIGNIFICANT CHANGE UP (ref 150–400)
PLATELET # BLD AUTO: 356 K/UL — SIGNIFICANT CHANGE UP (ref 150–400)
PLATELET # BLD AUTO: 356 K/UL — SIGNIFICANT CHANGE UP (ref 150–400)
PLATELET # BLD AUTO: 357 K/UL — SIGNIFICANT CHANGE UP (ref 150–400)
PLATELET # BLD AUTO: 359 K/UL — SIGNIFICANT CHANGE UP (ref 150–400)
PLATELET # BLD AUTO: 360 K/UL — SIGNIFICANT CHANGE UP (ref 150–400)
PLATELET # BLD AUTO: 364 K/UL — SIGNIFICANT CHANGE UP (ref 150–400)
PLATELET # BLD AUTO: 376 K/UL — SIGNIFICANT CHANGE UP (ref 150–400)
PO2 BLDV: 19 MMHG — LOW (ref 25–45)
PO2 BLDV: 32 MMHG — SIGNIFICANT CHANGE UP (ref 25–45)
PO2 BLDV: 38 MMHG — SIGNIFICANT CHANGE UP (ref 25–45)
PO2 BLDV: 40 MMHG — SIGNIFICANT CHANGE UP (ref 25–45)
PO2 BLDV: 42 MMHG — SIGNIFICANT CHANGE UP (ref 25–45)
PO2 BLDV: 43 MMHG — SIGNIFICANT CHANGE UP (ref 25–45)
POIKILOCYTOSIS BLD QL AUTO: SIGNIFICANT CHANGE UP
POLYCHROMASIA BLD QL SMEAR: SLIGHT — SIGNIFICANT CHANGE UP
POTASSIUM BLDV-SCNC: 3.9 MMOL/L — SIGNIFICANT CHANGE UP (ref 3.5–5.1)
POTASSIUM BLDV-SCNC: 4 MMOL/L — SIGNIFICANT CHANGE UP (ref 3.5–5.1)
POTASSIUM BLDV-SCNC: 4.5 MMOL/L — SIGNIFICANT CHANGE UP (ref 3.5–5.1)
POTASSIUM BLDV-SCNC: 4.8 MMOL/L — SIGNIFICANT CHANGE UP (ref 3.5–5.1)
POTASSIUM BLDV-SCNC: 6 MMOL/L — HIGH (ref 3.5–5.1)
POTASSIUM SERPL-MCNC: 3.4 MMOL/L — LOW (ref 3.5–5.3)
POTASSIUM SERPL-MCNC: 3.5 MMOL/L — SIGNIFICANT CHANGE UP (ref 3.5–5.3)
POTASSIUM SERPL-MCNC: 3.5 MMOL/L — SIGNIFICANT CHANGE UP (ref 3.5–5.3)
POTASSIUM SERPL-MCNC: 3.8 MMOL/L — SIGNIFICANT CHANGE UP (ref 3.5–5.3)
POTASSIUM SERPL-MCNC: 3.8 MMOL/L — SIGNIFICANT CHANGE UP (ref 3.5–5.3)
POTASSIUM SERPL-MCNC: 3.9 MMOL/L — SIGNIFICANT CHANGE UP (ref 3.5–5.3)
POTASSIUM SERPL-MCNC: 4 MMOL/L — SIGNIFICANT CHANGE UP (ref 3.5–5.3)
POTASSIUM SERPL-MCNC: 4.1 MMOL/L — SIGNIFICANT CHANGE UP (ref 3.5–5.3)
POTASSIUM SERPL-MCNC: 4.2 MMOL/L — SIGNIFICANT CHANGE UP (ref 3.5–5.3)
POTASSIUM SERPL-MCNC: 4.3 MMOL/L — SIGNIFICANT CHANGE UP (ref 3.5–5.3)
POTASSIUM SERPL-MCNC: 4.4 MMOL/L — SIGNIFICANT CHANGE UP (ref 3.5–5.3)
POTASSIUM SERPL-MCNC: 4.5 MMOL/L — SIGNIFICANT CHANGE UP (ref 3.5–5.3)
POTASSIUM SERPL-MCNC: 4.6 MMOL/L — SIGNIFICANT CHANGE UP (ref 3.5–5.3)
POTASSIUM SERPL-MCNC: 4.7 MMOL/L — SIGNIFICANT CHANGE UP (ref 3.5–5.3)
POTASSIUM SERPL-MCNC: 4.8 MMOL/L — SIGNIFICANT CHANGE UP (ref 3.5–5.3)
POTASSIUM SERPL-MCNC: 4.9 MMOL/L — SIGNIFICANT CHANGE UP (ref 3.5–5.3)
POTASSIUM SERPL-MCNC: 5 MMOL/L — SIGNIFICANT CHANGE UP (ref 3.5–5.3)
POTASSIUM SERPL-MCNC: 5.1 MMOL/L — SIGNIFICANT CHANGE UP (ref 3.5–5.3)
POTASSIUM SERPL-MCNC: 5.3 MMOL/L — SIGNIFICANT CHANGE UP (ref 3.5–5.3)
POTASSIUM SERPL-MCNC: 5.9 MMOL/L — HIGH (ref 3.5–5.3)
POTASSIUM SERPL-SCNC: 3.4 MMOL/L — LOW (ref 3.5–5.3)
POTASSIUM SERPL-SCNC: 3.5 MMOL/L — SIGNIFICANT CHANGE UP (ref 3.5–5.3)
POTASSIUM SERPL-SCNC: 3.5 MMOL/L — SIGNIFICANT CHANGE UP (ref 3.5–5.3)
POTASSIUM SERPL-SCNC: 3.8 MMOL/L — SIGNIFICANT CHANGE UP (ref 3.5–5.3)
POTASSIUM SERPL-SCNC: 3.8 MMOL/L — SIGNIFICANT CHANGE UP (ref 3.5–5.3)
POTASSIUM SERPL-SCNC: 3.9 MMOL/L — SIGNIFICANT CHANGE UP (ref 3.5–5.3)
POTASSIUM SERPL-SCNC: 4 MMOL/L — SIGNIFICANT CHANGE UP (ref 3.5–5.3)
POTASSIUM SERPL-SCNC: 4.1 MMOL/L — SIGNIFICANT CHANGE UP (ref 3.5–5.3)
POTASSIUM SERPL-SCNC: 4.2 MMOL/L — SIGNIFICANT CHANGE UP (ref 3.5–5.3)
POTASSIUM SERPL-SCNC: 4.3 MMOL/L — SIGNIFICANT CHANGE UP (ref 3.5–5.3)
POTASSIUM SERPL-SCNC: 4.4 MMOL/L — SIGNIFICANT CHANGE UP (ref 3.5–5.3)
POTASSIUM SERPL-SCNC: 4.5 MMOL/L — SIGNIFICANT CHANGE UP (ref 3.5–5.3)
POTASSIUM SERPL-SCNC: 4.6 MMOL/L — SIGNIFICANT CHANGE UP (ref 3.5–5.3)
POTASSIUM SERPL-SCNC: 4.7 MMOL/L — SIGNIFICANT CHANGE UP (ref 3.5–5.3)
POTASSIUM SERPL-SCNC: 4.8 MMOL/L — SIGNIFICANT CHANGE UP (ref 3.5–5.3)
POTASSIUM SERPL-SCNC: 4.9 MMOL/L — SIGNIFICANT CHANGE UP (ref 3.5–5.3)
POTASSIUM SERPL-SCNC: 5 MMOL/L — SIGNIFICANT CHANGE UP (ref 3.5–5.3)
POTASSIUM SERPL-SCNC: 5.1 MMOL/L — SIGNIFICANT CHANGE UP (ref 3.5–5.3)
POTASSIUM SERPL-SCNC: 5.3 MMOL/L — SIGNIFICANT CHANGE UP (ref 3.5–5.3)
POTASSIUM SERPL-SCNC: 5.9 MMOL/L — HIGH (ref 3.5–5.3)
PROT ?TM UR-MCNC: 133 MG/DL — HIGH (ref 0–12)
PROT PATTERN SERPL ELPH-IMP: SIGNIFICANT CHANGE UP
PROT PATTERN SERPL ELPH-IMP: SIGNIFICANT CHANGE UP
PROT SERPL-MCNC: 5.8 G/DL — LOW (ref 6–8.3)
PROT SERPL-MCNC: 6.1 G/DL — SIGNIFICANT CHANGE UP (ref 6–8.3)
PROT SERPL-MCNC: 6.1 G/DL — SIGNIFICANT CHANGE UP (ref 6–8.3)
PROT SERPL-MCNC: 6.2 G/DL — SIGNIFICANT CHANGE UP (ref 6–8.3)
PROT SERPL-MCNC: 6.5 G/DL — SIGNIFICANT CHANGE UP (ref 6–8.3)
PROT SERPL-MCNC: 6.5 G/DL — SIGNIFICANT CHANGE UP (ref 6–8.3)
PROT SERPL-MCNC: 6.6 G/DL — SIGNIFICANT CHANGE UP (ref 6–8.3)
PROT SERPL-MCNC: 6.7 G/DL — SIGNIFICANT CHANGE UP (ref 6–8.3)
PROT SERPL-MCNC: 6.8 G/DL — SIGNIFICANT CHANGE UP (ref 6–8.3)
PROT SERPL-MCNC: 6.8 G/DL — SIGNIFICANT CHANGE UP (ref 6–8.3)
PROT SERPL-MCNC: 6.9 G/DL — SIGNIFICANT CHANGE UP (ref 6–8.3)
PROT SERPL-MCNC: 6.9 G/DL — SIGNIFICANT CHANGE UP (ref 6–8.3)
PROT SERPL-MCNC: 7 G/DL — SIGNIFICANT CHANGE UP (ref 6–8.3)
PROT SERPL-MCNC: 7 G/DL — SIGNIFICANT CHANGE UP (ref 6–8.3)
PROT SERPL-MCNC: 7.1 G/DL — SIGNIFICANT CHANGE UP (ref 6–8.3)
PROT SERPL-MCNC: 7.2 G/DL — SIGNIFICANT CHANGE UP (ref 6–8.3)
PROT SERPL-MCNC: 7.2 G/DL — SIGNIFICANT CHANGE UP (ref 6–8.3)
PROT SERPL-MCNC: 7.3 G/DL — SIGNIFICANT CHANGE UP (ref 6–8.3)
PROT SERPL-MCNC: 7.5 G/DL — SIGNIFICANT CHANGE UP (ref 6–8.3)
PROT SERPL-MCNC: 7.7 G/DL — SIGNIFICANT CHANGE UP (ref 6–8.3)
PROT SERPL-MCNC: 8.1 G/DL — SIGNIFICANT CHANGE UP (ref 6–8.3)
PROT SERPL-MCNC: 8.3 G/DL — SIGNIFICANT CHANGE UP (ref 6–8.3)
PROT SERPL-MCNC: 8.3 G/DL — SIGNIFICANT CHANGE UP (ref 6–8.3)
PROT SERPL-MCNC: 8.5 G/DL — HIGH (ref 6–8.3)
PROT UR-MCNC: 100 — SIGNIFICANT CHANGE UP
PROT/CREAT UR-RTO: 3.7 RATIO — HIGH (ref 0–0.2)
PROTHROM AB SERPL-ACNC: 12 SEC — SIGNIFICANT CHANGE UP (ref 10.6–13.6)
PROTHROM AB SERPL-ACNC: 12.4 SEC — SIGNIFICANT CHANGE UP (ref 10.6–13.6)
PROTHROM AB SERPL-ACNC: 12.5 SEC — SIGNIFICANT CHANGE UP (ref 10.6–13.6)
PROTHROM AB SERPL-ACNC: 12.6 SEC — SIGNIFICANT CHANGE UP (ref 10.6–13.6)
PROTHROM AB SERPL-ACNC: 12.7 SEC — SIGNIFICANT CHANGE UP (ref 10.6–13.6)
PROTHROM AB SERPL-ACNC: 12.8 SEC — SIGNIFICANT CHANGE UP (ref 10.6–13.6)
PROTHROM AB SERPL-ACNC: 12.9 SEC — SIGNIFICANT CHANGE UP (ref 10.6–13.6)
PROTHROM AB SERPL-ACNC: 12.9 SEC — SIGNIFICANT CHANGE UP (ref 10.6–13.6)
PROTHROM AB SERPL-ACNC: 13 SEC — SIGNIFICANT CHANGE UP (ref 10.6–13.6)
PROTHROM AB SERPL-ACNC: 13.2 SEC — SIGNIFICANT CHANGE UP (ref 10.6–13.6)
PROTHROM AB SERPL-ACNC: 13.2 SEC — SIGNIFICANT CHANGE UP (ref 10.6–13.6)
PROTHROM AB SERPL-ACNC: 13.3 SEC — SIGNIFICANT CHANGE UP (ref 10.6–13.6)
PROTHROM AB SERPL-ACNC: 13.4 SEC — SIGNIFICANT CHANGE UP (ref 10.6–13.6)
PROTHROM AB SERPL-ACNC: 13.4 SEC — SIGNIFICANT CHANGE UP (ref 10.6–13.6)
PROTHROM AB SERPL-ACNC: 13.7 SEC — HIGH (ref 10.6–13.6)
PTH-INTACT FLD-MCNC: 305 PG/ML — HIGH (ref 15–65)
RBC # BLD: 1.92 M/UL — LOW (ref 4.2–5.8)
RBC # BLD: 1.96 M/UL — LOW (ref 4.2–5.8)
RBC # BLD: 2.07 M/UL — LOW (ref 4.2–5.8)
RBC # BLD: 2.1 M/UL — LOW (ref 4.2–5.8)
RBC # BLD: 2.11 M/UL — LOW (ref 4.2–5.8)
RBC # BLD: 2.12 M/UL — LOW (ref 4.2–5.8)
RBC # BLD: 2.18 M/UL — LOW (ref 4.2–5.8)
RBC # BLD: 2.19 M/UL — LOW (ref 4.2–5.8)
RBC # BLD: 2.19 M/UL — LOW (ref 4.2–5.8)
RBC # BLD: 2.22 M/UL — LOW (ref 4.2–5.8)
RBC # BLD: 2.22 M/UL — LOW (ref 4.2–5.8)
RBC # BLD: 2.24 M/UL — LOW (ref 4.2–5.8)
RBC # BLD: 2.24 M/UL — LOW (ref 4.2–5.8)
RBC # BLD: 2.25 M/UL — LOW (ref 4.2–5.8)
RBC # BLD: 2.26 M/UL — LOW (ref 4.2–5.8)
RBC # BLD: 2.27 M/UL — LOW (ref 4.2–5.8)
RBC # BLD: 2.29 M/UL — LOW (ref 4.2–5.8)
RBC # BLD: 2.29 M/UL — LOW (ref 4.2–5.8)
RBC # BLD: 2.31 M/UL — LOW (ref 4.2–5.8)
RBC # BLD: 2.32 M/UL — LOW (ref 4.2–5.8)
RBC # BLD: 2.32 M/UL — LOW (ref 4.2–5.8)
RBC # BLD: 2.33 M/UL — LOW (ref 4.2–5.8)
RBC # BLD: 2.34 M/UL — LOW (ref 4.2–5.8)
RBC # BLD: 2.34 M/UL — LOW (ref 4.2–5.8)
RBC # BLD: 2.39 M/UL — LOW (ref 4.2–5.8)
RBC # BLD: 2.4 M/UL — LOW (ref 4.2–5.8)
RBC # BLD: 2.44 M/UL — LOW (ref 4.2–5.8)
RBC # BLD: 2.44 M/UL — LOW (ref 4.2–5.8)
RBC # BLD: 2.46 M/UL — LOW (ref 4.2–5.8)
RBC # BLD: 2.46 M/UL — LOW (ref 4.2–5.8)
RBC # BLD: 2.47 M/UL — LOW (ref 4.2–5.8)
RBC # BLD: 2.47 M/UL — LOW (ref 4.2–5.8)
RBC # BLD: 2.48 M/UL — LOW (ref 4.2–5.8)
RBC # BLD: 2.49 M/UL — LOW (ref 4.2–5.8)
RBC # BLD: 2.5 M/UL — LOW (ref 4.2–5.8)
RBC # BLD: 2.5 M/UL — LOW (ref 4.2–5.8)
RBC # BLD: 2.51 M/UL — LOW (ref 4.2–5.8)
RBC # BLD: 2.52 M/UL — LOW (ref 4.2–5.8)
RBC # BLD: 2.53 M/UL — LOW (ref 4.2–5.8)
RBC # BLD: 2.54 M/UL — LOW (ref 4.2–5.8)
RBC # BLD: 2.54 M/UL — LOW (ref 4.2–5.8)
RBC # BLD: 2.55 M/UL — LOW (ref 4.2–5.8)
RBC # BLD: 2.58 M/UL — LOW (ref 4.2–5.8)
RBC # BLD: 2.62 M/UL — LOW (ref 4.2–5.8)
RBC # BLD: 2.64 M/UL — LOW (ref 4.2–5.8)
RBC # BLD: 2.64 M/UL — LOW (ref 4.2–5.8)
RBC # BLD: 2.67 M/UL — LOW (ref 4.2–5.8)
RBC # BLD: 2.67 M/UL — LOW (ref 4.2–5.8)
RBC # BLD: 2.68 M/UL — LOW (ref 4.2–5.8)
RBC # BLD: 2.7 M/UL — LOW (ref 4.2–5.8)
RBC # BLD: 2.71 M/UL — LOW (ref 4.2–5.8)
RBC # BLD: 2.73 M/UL — LOW (ref 4.2–5.8)
RBC # BLD: 2.76 M/UL — LOW (ref 4.2–5.8)
RBC # BLD: 2.8 M/UL — LOW (ref 4.2–5.8)
RBC # BLD: 2.81 M/UL — LOW (ref 4.2–5.8)
RBC # BLD: 2.82 M/UL — LOW (ref 4.2–5.8)
RBC # BLD: 2.88 M/UL — LOW (ref 4.2–5.8)
RBC # BLD: 2.88 M/UL — LOW (ref 4.2–5.8)
RBC # BLD: 2.91 M/UL — LOW (ref 4.2–5.8)
RBC # BLD: 2.93 M/UL — LOW (ref 4.2–5.8)
RBC # BLD: 2.94 M/UL — LOW (ref 4.2–5.8)
RBC # BLD: 2.95 M/UL — LOW (ref 4.2–5.8)
RBC # BLD: 2.97 M/UL — LOW (ref 4.2–5.8)
RBC # BLD: 2.99 M/UL — LOW (ref 4.2–5.8)
RBC # BLD: 3.01 M/UL — LOW (ref 4.2–5.8)
RBC # BLD: 3.07 M/UL — LOW (ref 4.2–5.8)
RBC # BLD: 3.08 M/UL — LOW (ref 4.2–5.8)
RBC # BLD: 3.08 M/UL — LOW (ref 4.2–5.8)
RBC # BLD: 3.1 M/UL — LOW (ref 4.2–5.8)
RBC # BLD: 3.14 M/UL — LOW (ref 4.2–5.8)
RBC # BLD: 3.14 M/UL — LOW (ref 4.2–5.8)
RBC # BLD: 3.2 M/UL — LOW (ref 4.2–5.8)
RBC # BLD: 3.27 M/UL — LOW (ref 4.2–5.8)
RBC # BLD: 3.28 M/UL — LOW (ref 4.2–5.8)
RBC # BLD: 3.4 M/UL — LOW (ref 4.2–5.8)
RBC # FLD: 13.8 % — SIGNIFICANT CHANGE UP (ref 10.3–14.5)
RBC # FLD: 13.9 % — SIGNIFICANT CHANGE UP (ref 10.3–14.5)
RBC # FLD: 14 % — SIGNIFICANT CHANGE UP (ref 10.3–14.5)
RBC # FLD: 14.1 % — SIGNIFICANT CHANGE UP (ref 10.3–14.5)
RBC # FLD: 14.2 % — SIGNIFICANT CHANGE UP (ref 10.3–14.5)
RBC # FLD: 14.4 % — SIGNIFICANT CHANGE UP (ref 10.3–14.5)
RBC # FLD: 15 % — HIGH (ref 10.3–14.5)
RBC # FLD: 15.2 % — HIGH (ref 10.3–14.5)
RBC # FLD: 15.2 % — HIGH (ref 10.3–14.5)
RBC # FLD: 15.4 % — HIGH (ref 10.3–14.5)
RBC # FLD: 15.5 % — HIGH (ref 10.3–14.5)
RBC # FLD: 15.6 % — HIGH (ref 10.3–14.5)
RBC # FLD: 15.7 % — HIGH (ref 10.3–14.5)
RBC # FLD: 15.8 % — HIGH (ref 10.3–14.5)
RBC # FLD: 15.9 % — HIGH (ref 10.3–14.5)
RBC # FLD: 15.9 % — HIGH (ref 10.3–14.5)
RBC # FLD: 16 % — HIGH (ref 10.3–14.5)
RBC # FLD: 16 % — HIGH (ref 10.3–14.5)
RBC # FLD: 16.1 % — HIGH (ref 10.3–14.5)
RBC # FLD: 16.2 % — HIGH (ref 10.3–14.5)
RBC # FLD: 16.7 % — HIGH (ref 10.3–14.5)
RBC # FLD: 16.9 % — HIGH (ref 10.3–14.5)
RBC # FLD: 17.3 % — HIGH (ref 10.3–14.5)
RBC # FLD: 17.7 % — HIGH (ref 10.3–14.5)
RBC # FLD: 17.9 % — HIGH (ref 10.3–14.5)
RBC # FLD: 18.1 % — HIGH (ref 10.3–14.5)
RBC # FLD: 18.2 % — HIGH (ref 10.3–14.5)
RBC # FLD: 18.3 % — HIGH (ref 10.3–14.5)
RBC # FLD: 18.4 % — HIGH (ref 10.3–14.5)
RBC # FLD: 18.5 % — HIGH (ref 10.3–14.5)
RBC # FLD: 18.6 % — HIGH (ref 10.3–14.5)
RBC # FLD: 18.7 % — HIGH (ref 10.3–14.5)
RBC # FLD: 18.8 % — HIGH (ref 10.3–14.5)
RBC # FLD: 18.8 % — HIGH (ref 10.3–14.5)
RBC # FLD: 18.9 % — HIGH (ref 10.3–14.5)
RBC # FLD: 19 % — HIGH (ref 10.3–14.5)
RBC # FLD: 19.1 % — HIGH (ref 10.3–14.5)
RBC # FLD: 19.1 % — HIGH (ref 10.3–14.5)
RBC # FLD: 19.2 % — HIGH (ref 10.3–14.5)
RBC # FLD: 19.4 % — HIGH (ref 10.3–14.5)
RBC # FLD: 19.5 % — HIGH (ref 10.3–14.5)
RBC # FLD: 19.5 % — HIGH (ref 10.3–14.5)
RBC # FLD: 19.6 % — HIGH (ref 10.3–14.5)
RBC # FLD: 19.6 % — HIGH (ref 10.3–14.5)
RBC # FLD: 19.8 % — HIGH (ref 10.3–14.5)
RBC # FLD: 19.8 % — HIGH (ref 10.3–14.5)
RBC # FLD: 19.9 % — HIGH (ref 10.3–14.5)
RBC # FLD: 20 % — HIGH (ref 10.3–14.5)
RBC # FLD: 20.1 % — HIGH (ref 10.3–14.5)
RBC # FLD: 20.3 % — HIGH (ref 10.3–14.5)
RBC # FLD: 20.3 % — HIGH (ref 10.3–14.5)
RBC # FLD: 20.5 % — HIGH (ref 10.3–14.5)
RBC # FLD: 20.6 % — HIGH (ref 10.3–14.5)
RBC # FLD: 20.7 % — HIGH (ref 10.3–14.5)
RBC # FLD: 20.9 % — HIGH (ref 10.3–14.5)
RBC # FLD: 21 % — HIGH (ref 10.3–14.5)
RBC # FLD: 21.1 % — HIGH (ref 10.3–14.5)
RBC # FLD: 21.2 % — HIGH (ref 10.3–14.5)
RBC # FLD: 21.4 % — HIGH (ref 10.3–14.5)
RBC # FLD: 22 % — HIGH (ref 10.3–14.5)
RBC # FLD: 22.4 % — HIGH (ref 10.3–14.5)
RBC # FLD: 22.5 % — HIGH (ref 10.3–14.5)
RBC BLD AUTO: ABNORMAL
RBC BLD AUTO: SIGNIFICANT CHANGE UP
RBC BLD AUTO: SIGNIFICANT CHANGE UP
RBC CASTS # UR COMP ASSIST: 2 /HPF — SIGNIFICANT CHANGE UP (ref 0–4)
REPTILASE TIME: 18.7 SEC — SIGNIFICANT CHANGE UP (ref 15–20.5)
RH IG SCN BLD-IMP: POSITIVE — SIGNIFICANT CHANGE UP
S AUREUS DNA NOSE QL NAA+PROBE: DETECTED
SAO2 % BLDV: 31 % — LOW (ref 67–88)
SAO2 % BLDV: 49.6 % — LOW (ref 67–88)
SAO2 % BLDV: 63.3 % — LOW (ref 67–88)
SAO2 % BLDV: 72.9 % — SIGNIFICANT CHANGE UP (ref 67–88)
SAO2 % BLDV: 74.7 % — SIGNIFICANT CHANGE UP (ref 67–88)
SAO2 % BLDV: 75.1 % — SIGNIFICANT CHANGE UP (ref 67–88)
SARS-COV-2 IGG+IGM SERPL QL IA: >250 U/ML — HIGH
SARS-COV-2 IGG+IGM SERPL QL IA: POSITIVE
SARS-COV-2 RNA SPEC QL NAA+PROBE: DETECTED
SARS-COV-2 RNA SPEC QL NAA+PROBE: DETECTED
SARS-COV-2 RNA SPEC QL NAA+PROBE: SIGNIFICANT CHANGE UP
SCHISTOCYTES BLD QL AUTO: SLIGHT — SIGNIFICANT CHANGE UP
SODIUM SERPL-SCNC: 130 MMOL/L — LOW (ref 135–145)
SODIUM SERPL-SCNC: 131 MMOL/L — LOW (ref 135–145)
SODIUM SERPL-SCNC: 132 MMOL/L — LOW (ref 135–145)
SODIUM SERPL-SCNC: 133 MMOL/L — LOW (ref 135–145)
SODIUM SERPL-SCNC: 134 MMOL/L — LOW (ref 135–145)
SODIUM SERPL-SCNC: 135 MMOL/L — SIGNIFICANT CHANGE UP (ref 135–145)
SODIUM SERPL-SCNC: 136 MMOL/L — SIGNIFICANT CHANGE UP (ref 135–145)
SODIUM SERPL-SCNC: 137 MMOL/L — SIGNIFICANT CHANGE UP (ref 135–145)
SODIUM SERPL-SCNC: 138 MMOL/L — SIGNIFICANT CHANGE UP (ref 135–145)
SODIUM SERPL-SCNC: 140 MMOL/L — SIGNIFICANT CHANGE UP (ref 135–145)
SP GR SPEC: 1.01 — SIGNIFICANT CHANGE UP (ref 1.01–1.02)
SPECIMEN SOURCE: SIGNIFICANT CHANGE UP
T3 SERPL-MCNC: 48 NG/DL — LOW (ref 80–200)
T4 FREE SERPL-MCNC: 1 NG/DL — SIGNIFICANT CHANGE UP (ref 0.9–1.8)
T4 FREE SERPL-MCNC: 1 NG/DL — SIGNIFICANT CHANGE UP (ref 0.9–1.8)
T4 FREE SERPL-MCNC: 1.2 NG/DL — SIGNIFICANT CHANGE UP (ref 0.9–1.8)
T4 FREE SERPL-MCNC: 1.2 NG/DL — SIGNIFICANT CHANGE UP (ref 0.9–1.8)
TARGETS BLD QL SMEAR: SLIGHT — SIGNIFICANT CHANGE UP
THROMBIN TIME: 30.3 SEC — HIGH (ref 16–25)
TIBC SERPL-MCNC: 185 UG/DL — LOW (ref 220–430)
TIBC SERPL-MCNC: 205 UG/DL — LOW (ref 220–430)
TRIGL SERPL-MCNC: 35 MG/DL — SIGNIFICANT CHANGE UP
TROPONIN T, HIGH SENSITIVITY RESULT: 87 NG/L — HIGH (ref 0–51)
TROPONIN T, HIGH SENSITIVITY RESULT: 88 NG/L — HIGH (ref 0–51)
TROPONIN T, HIGH SENSITIVITY RESULT: 94 NG/L — HIGH (ref 0–51)
TSH SERPL-MCNC: 6.38 UIU/ML — HIGH (ref 0.27–4.2)
UIBC SERPL-MCNC: 113 UG/DL — SIGNIFICANT CHANGE UP (ref 110–370)
UIBC SERPL-MCNC: 175 UG/DL — SIGNIFICANT CHANGE UP (ref 110–370)
UROBILINOGEN FLD QL: NEGATIVE — SIGNIFICANT CHANGE UP
VANCOMYCIN FLD-MCNC: 12.8 UG/ML — SIGNIFICANT CHANGE UP
VANCOMYCIN FLD-MCNC: 17.5 UG/ML — SIGNIFICANT CHANGE UP
VANCOMYCIN FLD-MCNC: 26.7 UG/ML
VARIANT LYMPHS # BLD: 0.9 % — SIGNIFICANT CHANGE UP (ref 0–6)
VIT B1 SERPL-MCNC: 85.5 NMOL/L — SIGNIFICANT CHANGE UP (ref 66.5–200)
VIT B12 SERPL-MCNC: 648 PG/ML — SIGNIFICANT CHANGE UP (ref 232–1245)
VIT B12 SERPL-MCNC: 673 PG/ML — SIGNIFICANT CHANGE UP (ref 232–1245)
VWF:RCO ACT/NOR PPP PL AGG: >390 % — HIGH (ref 45–133)
WBC # BLD: 10.18 K/UL — SIGNIFICANT CHANGE UP (ref 3.8–10.5)
WBC # BLD: 10.43 K/UL — SIGNIFICANT CHANGE UP (ref 3.8–10.5)
WBC # BLD: 10.44 K/UL — SIGNIFICANT CHANGE UP (ref 3.8–10.5)
WBC # BLD: 10.47 K/UL — SIGNIFICANT CHANGE UP (ref 3.8–10.5)
WBC # BLD: 10.47 K/UL — SIGNIFICANT CHANGE UP (ref 3.8–10.5)
WBC # BLD: 10.49 K/UL — SIGNIFICANT CHANGE UP (ref 3.8–10.5)
WBC # BLD: 10.64 K/UL — HIGH (ref 3.8–10.5)
WBC # BLD: 10.65 K/UL — HIGH (ref 3.8–10.5)
WBC # BLD: 10.92 K/UL — HIGH (ref 3.8–10.5)
WBC # BLD: 11.1 K/UL — HIGH (ref 3.8–10.5)
WBC # BLD: 11.3 K/UL — HIGH (ref 3.8–10.5)
WBC # BLD: 11.66 K/UL — HIGH (ref 3.8–10.5)
WBC # BLD: 11.79 K/UL — HIGH (ref 3.8–10.5)
WBC # BLD: 11.79 K/UL — HIGH (ref 3.8–10.5)
WBC # BLD: 12.27 K/UL — HIGH (ref 3.8–10.5)
WBC # BLD: 12.3 K/UL — HIGH (ref 3.8–10.5)
WBC # BLD: 12.34 K/UL — HIGH (ref 3.8–10.5)
WBC # BLD: 12.41 K/UL — HIGH (ref 3.8–10.5)
WBC # BLD: 12.41 K/UL — HIGH (ref 3.8–10.5)
WBC # BLD: 12.58 K/UL — HIGH (ref 3.8–10.5)
WBC # BLD: 12.75 K/UL — HIGH (ref 3.8–10.5)
WBC # BLD: 12.81 K/UL — HIGH (ref 3.8–10.5)
WBC # BLD: 12.99 K/UL — HIGH (ref 3.8–10.5)
WBC # BLD: 13.02 K/UL — HIGH (ref 3.8–10.5)
WBC # BLD: 13.02 K/UL — HIGH (ref 3.8–10.5)
WBC # BLD: 13.16 K/UL — HIGH (ref 3.8–10.5)
WBC # BLD: 13.21 K/UL — HIGH (ref 3.8–10.5)
WBC # BLD: 13.37 K/UL — HIGH (ref 3.8–10.5)
WBC # BLD: 13.62 K/UL — HIGH (ref 3.8–10.5)
WBC # BLD: 13.69 K/UL — HIGH (ref 3.8–10.5)
WBC # BLD: 14.1 K/UL — HIGH (ref 3.8–10.5)
WBC # BLD: 14.1 K/UL — HIGH (ref 3.8–10.5)
WBC # BLD: 14.28 K/UL — HIGH (ref 3.8–10.5)
WBC # BLD: 14.93 K/UL — HIGH (ref 3.8–10.5)
WBC # BLD: 14.95 K/UL — HIGH (ref 3.8–10.5)
WBC # BLD: 15 K/UL — HIGH (ref 3.8–10.5)
WBC # BLD: 16.38 K/UL — HIGH (ref 3.8–10.5)
WBC # BLD: 16.52 K/UL — HIGH (ref 3.8–10.5)
WBC # BLD: 16.66 K/UL — HIGH (ref 3.8–10.5)
WBC # BLD: 16.93 K/UL — HIGH (ref 3.8–10.5)
WBC # BLD: 17.02 K/UL — HIGH (ref 3.8–10.5)
WBC # BLD: 17.03 K/UL — HIGH (ref 3.8–10.5)
WBC # BLD: 17.13 K/UL — HIGH (ref 3.8–10.5)
WBC # BLD: 17.26 K/UL — HIGH (ref 3.8–10.5)
WBC # BLD: 17.41 K/UL — HIGH (ref 3.8–10.5)
WBC # BLD: 17.48 K/UL — HIGH (ref 3.8–10.5)
WBC # BLD: 17.83 K/UL — HIGH (ref 3.8–10.5)
WBC # BLD: 18.73 K/UL — HIGH (ref 3.8–10.5)
WBC # BLD: 19 K/UL — HIGH (ref 3.8–10.5)
WBC # BLD: 19.77 K/UL — HIGH (ref 3.8–10.5)
WBC # BLD: 19.81 K/UL — HIGH (ref 3.8–10.5)
WBC # BLD: 19.99 K/UL — HIGH (ref 3.8–10.5)
WBC # BLD: 21.96 K/UL — HIGH (ref 3.8–10.5)
WBC # BLD: 7.14 K/UL — SIGNIFICANT CHANGE UP (ref 3.8–10.5)
WBC # BLD: 7.32 K/UL — SIGNIFICANT CHANGE UP (ref 3.8–10.5)
WBC # BLD: 7.5 K/UL — SIGNIFICANT CHANGE UP (ref 3.8–10.5)
WBC # BLD: 7.87 K/UL — SIGNIFICANT CHANGE UP (ref 3.8–10.5)
WBC # BLD: 7.88 K/UL — SIGNIFICANT CHANGE UP (ref 3.8–10.5)
WBC # BLD: 8.11 K/UL — SIGNIFICANT CHANGE UP (ref 3.8–10.5)
WBC # BLD: 8.14 K/UL — SIGNIFICANT CHANGE UP (ref 3.8–10.5)
WBC # BLD: 8.45 K/UL — SIGNIFICANT CHANGE UP (ref 3.8–10.5)
WBC # BLD: 8.48 K/UL — SIGNIFICANT CHANGE UP (ref 3.8–10.5)
WBC # BLD: 8.51 K/UL — SIGNIFICANT CHANGE UP (ref 3.8–10.5)
WBC # BLD: 8.65 K/UL — SIGNIFICANT CHANGE UP (ref 3.8–10.5)
WBC # BLD: 8.76 K/UL — SIGNIFICANT CHANGE UP (ref 3.8–10.5)
WBC # BLD: 8.8 K/UL — SIGNIFICANT CHANGE UP (ref 3.8–10.5)
WBC # BLD: 8.89 K/UL — SIGNIFICANT CHANGE UP (ref 3.8–10.5)
WBC # BLD: 8.95 K/UL — SIGNIFICANT CHANGE UP (ref 3.8–10.5)
WBC # BLD: 8.97 K/UL — SIGNIFICANT CHANGE UP (ref 3.8–10.5)
WBC # BLD: 9.04 K/UL — SIGNIFICANT CHANGE UP (ref 3.8–10.5)
WBC # BLD: 9.06 K/UL — SIGNIFICANT CHANGE UP (ref 3.8–10.5)
WBC # BLD: 9.14 K/UL — SIGNIFICANT CHANGE UP (ref 3.8–10.5)
WBC # BLD: 9.15 K/UL — SIGNIFICANT CHANGE UP (ref 3.8–10.5)
WBC # BLD: 9.22 K/UL — SIGNIFICANT CHANGE UP (ref 3.8–10.5)
WBC # BLD: 9.4 K/UL — SIGNIFICANT CHANGE UP (ref 3.8–10.5)
WBC # BLD: 9.47 K/UL — SIGNIFICANT CHANGE UP (ref 3.8–10.5)
WBC # BLD: 9.65 K/UL — SIGNIFICANT CHANGE UP (ref 3.8–10.5)
WBC # FLD AUTO: 10.18 K/UL — SIGNIFICANT CHANGE UP (ref 3.8–10.5)
WBC # FLD AUTO: 10.43 K/UL — SIGNIFICANT CHANGE UP (ref 3.8–10.5)
WBC # FLD AUTO: 10.44 K/UL — SIGNIFICANT CHANGE UP (ref 3.8–10.5)
WBC # FLD AUTO: 10.47 K/UL — SIGNIFICANT CHANGE UP (ref 3.8–10.5)
WBC # FLD AUTO: 10.47 K/UL — SIGNIFICANT CHANGE UP (ref 3.8–10.5)
WBC # FLD AUTO: 10.49 K/UL — SIGNIFICANT CHANGE UP (ref 3.8–10.5)
WBC # FLD AUTO: 10.64 K/UL — HIGH (ref 3.8–10.5)
WBC # FLD AUTO: 10.65 K/UL — HIGH (ref 3.8–10.5)
WBC # FLD AUTO: 10.92 K/UL — HIGH (ref 3.8–10.5)
WBC # FLD AUTO: 11.1 K/UL — HIGH (ref 3.8–10.5)
WBC # FLD AUTO: 11.3 K/UL — HIGH (ref 3.8–10.5)
WBC # FLD AUTO: 11.66 K/UL — HIGH (ref 3.8–10.5)
WBC # FLD AUTO: 11.79 K/UL — HIGH (ref 3.8–10.5)
WBC # FLD AUTO: 11.79 K/UL — HIGH (ref 3.8–10.5)
WBC # FLD AUTO: 12.27 K/UL — HIGH (ref 3.8–10.5)
WBC # FLD AUTO: 12.3 K/UL — HIGH (ref 3.8–10.5)
WBC # FLD AUTO: 12.34 K/UL — HIGH (ref 3.8–10.5)
WBC # FLD AUTO: 12.41 K/UL — HIGH (ref 3.8–10.5)
WBC # FLD AUTO: 12.41 K/UL — HIGH (ref 3.8–10.5)
WBC # FLD AUTO: 12.58 K/UL — HIGH (ref 3.8–10.5)
WBC # FLD AUTO: 12.75 K/UL — HIGH (ref 3.8–10.5)
WBC # FLD AUTO: 12.81 K/UL — HIGH (ref 3.8–10.5)
WBC # FLD AUTO: 12.99 K/UL — HIGH (ref 3.8–10.5)
WBC # FLD AUTO: 13.02 K/UL — HIGH (ref 3.8–10.5)
WBC # FLD AUTO: 13.02 K/UL — HIGH (ref 3.8–10.5)
WBC # FLD AUTO: 13.16 K/UL — HIGH (ref 3.8–10.5)
WBC # FLD AUTO: 13.21 K/UL — HIGH (ref 3.8–10.5)
WBC # FLD AUTO: 13.37 K/UL — HIGH (ref 3.8–10.5)
WBC # FLD AUTO: 13.62 K/UL — HIGH (ref 3.8–10.5)
WBC # FLD AUTO: 13.69 K/UL — HIGH (ref 3.8–10.5)
WBC # FLD AUTO: 14.1 K/UL — HIGH (ref 3.8–10.5)
WBC # FLD AUTO: 14.1 K/UL — HIGH (ref 3.8–10.5)
WBC # FLD AUTO: 14.28 K/UL — HIGH (ref 3.8–10.5)
WBC # FLD AUTO: 14.93 K/UL — HIGH (ref 3.8–10.5)
WBC # FLD AUTO: 14.95 K/UL — HIGH (ref 3.8–10.5)
WBC # FLD AUTO: 15 K/UL — HIGH (ref 3.8–10.5)
WBC # FLD AUTO: 16.38 K/UL — HIGH (ref 3.8–10.5)
WBC # FLD AUTO: 16.52 K/UL — HIGH (ref 3.8–10.5)
WBC # FLD AUTO: 16.66 K/UL — HIGH (ref 3.8–10.5)
WBC # FLD AUTO: 16.93 K/UL — HIGH (ref 3.8–10.5)
WBC # FLD AUTO: 17.02 K/UL — HIGH (ref 3.8–10.5)
WBC # FLD AUTO: 17.03 K/UL — HIGH (ref 3.8–10.5)
WBC # FLD AUTO: 17.13 K/UL — HIGH (ref 3.8–10.5)
WBC # FLD AUTO: 17.26 K/UL — HIGH (ref 3.8–10.5)
WBC # FLD AUTO: 17.41 K/UL — HIGH (ref 3.8–10.5)
WBC # FLD AUTO: 17.48 K/UL — HIGH (ref 3.8–10.5)
WBC # FLD AUTO: 17.83 K/UL — HIGH (ref 3.8–10.5)
WBC # FLD AUTO: 18.73 K/UL — HIGH (ref 3.8–10.5)
WBC # FLD AUTO: 19 K/UL — HIGH (ref 3.8–10.5)
WBC # FLD AUTO: 19.77 K/UL — HIGH (ref 3.8–10.5)
WBC # FLD AUTO: 19.81 K/UL — HIGH (ref 3.8–10.5)
WBC # FLD AUTO: 19.99 K/UL — HIGH (ref 3.8–10.5)
WBC # FLD AUTO: 21.96 K/UL — HIGH (ref 3.8–10.5)
WBC # FLD AUTO: 7.14 K/UL — SIGNIFICANT CHANGE UP (ref 3.8–10.5)
WBC # FLD AUTO: 7.32 K/UL — SIGNIFICANT CHANGE UP (ref 3.8–10.5)
WBC # FLD AUTO: 7.5 K/UL — SIGNIFICANT CHANGE UP (ref 3.8–10.5)
WBC # FLD AUTO: 7.87 K/UL — SIGNIFICANT CHANGE UP (ref 3.8–10.5)
WBC # FLD AUTO: 7.88 K/UL — SIGNIFICANT CHANGE UP (ref 3.8–10.5)
WBC # FLD AUTO: 8.11 K/UL — SIGNIFICANT CHANGE UP (ref 3.8–10.5)
WBC # FLD AUTO: 8.14 K/UL — SIGNIFICANT CHANGE UP (ref 3.8–10.5)
WBC # FLD AUTO: 8.45 K/UL — SIGNIFICANT CHANGE UP (ref 3.8–10.5)
WBC # FLD AUTO: 8.48 K/UL — SIGNIFICANT CHANGE UP (ref 3.8–10.5)
WBC # FLD AUTO: 8.51 K/UL — SIGNIFICANT CHANGE UP (ref 3.8–10.5)
WBC # FLD AUTO: 8.65 K/UL — SIGNIFICANT CHANGE UP (ref 3.8–10.5)
WBC # FLD AUTO: 8.76 K/UL — SIGNIFICANT CHANGE UP (ref 3.8–10.5)
WBC # FLD AUTO: 8.8 K/UL — SIGNIFICANT CHANGE UP (ref 3.8–10.5)
WBC # FLD AUTO: 8.89 K/UL — SIGNIFICANT CHANGE UP (ref 3.8–10.5)
WBC # FLD AUTO: 8.95 K/UL — SIGNIFICANT CHANGE UP (ref 3.8–10.5)
WBC # FLD AUTO: 8.97 K/UL — SIGNIFICANT CHANGE UP (ref 3.8–10.5)
WBC # FLD AUTO: 9.04 K/UL — SIGNIFICANT CHANGE UP (ref 3.8–10.5)
WBC # FLD AUTO: 9.06 K/UL — SIGNIFICANT CHANGE UP (ref 3.8–10.5)
WBC # FLD AUTO: 9.14 K/UL — SIGNIFICANT CHANGE UP (ref 3.8–10.5)
WBC # FLD AUTO: 9.15 K/UL — SIGNIFICANT CHANGE UP (ref 3.8–10.5)
WBC # FLD AUTO: 9.22 K/UL — SIGNIFICANT CHANGE UP (ref 3.8–10.5)
WBC # FLD AUTO: 9.4 K/UL — SIGNIFICANT CHANGE UP (ref 3.8–10.5)
WBC # FLD AUTO: 9.47 K/UL — SIGNIFICANT CHANGE UP (ref 3.8–10.5)
WBC # FLD AUTO: 9.65 K/UL — SIGNIFICANT CHANGE UP (ref 3.8–10.5)
WBC UR QL: 1 /HPF — SIGNIFICANT CHANGE UP (ref 0–5)

## 2021-01-01 PROCEDURE — 99291 CRITICAL CARE FIRST HOUR: CPT

## 2021-01-01 PROCEDURE — 99232 SBSQ HOSP IP/OBS MODERATE 35: CPT

## 2021-01-01 PROCEDURE — 71045 X-RAY EXAM CHEST 1 VIEW: CPT | Mod: 26

## 2021-01-01 PROCEDURE — 77001 FLUOROGUIDE FOR VEIN DEVICE: CPT | Mod: 26

## 2021-01-01 PROCEDURE — 93010 ELECTROCARDIOGRAM REPORT: CPT

## 2021-01-01 PROCEDURE — 70450 CT HEAD/BRAIN W/O DYE: CPT | Mod: 26

## 2021-01-01 PROCEDURE — 92610 EVALUATE SWALLOWING FUNCTION: CPT | Mod: GN

## 2021-01-01 PROCEDURE — 36556 INSERT NON-TUNNEL CV CATH: CPT

## 2021-01-01 PROCEDURE — 99231 SBSQ HOSP IP/OBS SF/LOW 25: CPT

## 2021-01-01 PROCEDURE — 99497 ADVNCD CARE PLAN 30 MIN: CPT | Mod: 25

## 2021-01-01 PROCEDURE — 36247 INS CATH ABD/L-EXT ART 3RD: CPT

## 2021-01-01 PROCEDURE — 37244 VASC EMBOLIZE/OCCLUDE BLEED: CPT

## 2021-01-01 PROCEDURE — 99285 EMERGENCY DEPT VISIT HI MDM: CPT

## 2021-01-01 PROCEDURE — 76937 US GUIDE VASCULAR ACCESS: CPT | Mod: 26

## 2021-01-01 PROCEDURE — 74018 RADEX ABDOMEN 1 VIEW: CPT | Mod: 26

## 2021-01-01 PROCEDURE — 76775 US EXAM ABDO BACK WALL LIM: CPT | Mod: 26

## 2021-01-01 PROCEDURE — 36248 INS CATH ABD/L-EXT ART ADDL: CPT

## 2021-01-01 PROCEDURE — 99221 1ST HOSP IP/OBS SF/LOW 40: CPT | Mod: 25

## 2021-01-01 PROCEDURE — 99223 1ST HOSP IP/OBS HIGH 75: CPT

## 2021-01-01 PROCEDURE — 73590 X-RAY EXAM OF LOWER LEG: CPT | Mod: 26,50

## 2021-01-01 PROCEDURE — 93970 EXTREMITY STUDY: CPT | Mod: 26

## 2021-01-01 PROCEDURE — 93971 EXTREMITY STUDY: CPT | Mod: 26,LT

## 2021-01-01 PROCEDURE — 74177 CT ABD & PELVIS W/CONTRAST: CPT | Mod: 26

## 2021-01-01 PROCEDURE — 99233 SBSQ HOSP IP/OBS HIGH 50: CPT | Mod: GC

## 2021-01-01 PROCEDURE — 99291 CRITICAL CARE FIRST HOUR: CPT | Mod: 25

## 2021-01-01 PROCEDURE — 36246 INS CATH ABD/L-EXT ART 2ND: CPT | Mod: 59

## 2021-01-01 PROCEDURE — 99223 1ST HOSP IP/OBS HIGH 75: CPT | Mod: 25

## 2021-01-01 PROCEDURE — 99222 1ST HOSP IP/OBS MODERATE 55: CPT | Mod: GC

## 2021-01-01 PROCEDURE — 36558 INSERT TUNNELED CV CATH: CPT

## 2021-01-01 PROCEDURE — 11750 EXCISION NAIL&NAIL MATRIX: CPT | Mod: 59

## 2021-01-01 PROCEDURE — 99222 1ST HOSP IP/OBS MODERATE 55: CPT

## 2021-01-01 PROCEDURE — 99233 SBSQ HOSP IP/OBS HIGH 50: CPT

## 2021-01-01 PROCEDURE — 36245 INS CATH ABD/L-EXT ART 1ST: CPT | Mod: 59

## 2021-01-01 PROCEDURE — 36818 AV FUSE UPPR ARM CEPHALIC: CPT | Mod: LT

## 2021-01-01 PROCEDURE — 99497 ADVNCD CARE PLAN 30 MIN: CPT

## 2021-01-01 PROCEDURE — 74176 CT ABD & PELVIS W/O CONTRAST: CPT | Mod: 26

## 2021-01-01 PROCEDURE — 71045 X-RAY EXAM CHEST 1 VIEW: CPT | Mod: 26,77,76

## 2021-01-01 PROCEDURE — 76604 US EXAM CHEST: CPT | Mod: 26,GC

## 2021-01-01 PROCEDURE — 93990 DOPPLER FLOW TESTING: CPT | Mod: 26

## 2021-01-01 PROCEDURE — 99214 OFFICE O/P EST MOD 30 MIN: CPT

## 2021-01-01 PROCEDURE — 11721 DEBRIDE NAIL 6 OR MORE: CPT | Mod: 59

## 2021-01-01 PROCEDURE — 74230 X-RAY XM SWLNG FUNCJ C+: CPT | Mod: 26

## 2021-01-01 PROCEDURE — 93306 TTE W/DOPPLER COMPLETE: CPT | Mod: 26

## 2021-01-01 RX ORDER — ACETAMINOPHEN 500 MG
650 TABLET ORAL EVERY 6 HOURS
Refills: 0 | Status: DISCONTINUED | OUTPATIENT
Start: 2021-01-01 | End: 2021-01-01

## 2021-01-01 RX ORDER — ALPRAZOLAM 0.25 MG
0.25 TABLET ORAL ONCE
Refills: 0 | Status: DISCONTINUED | OUTPATIENT
Start: 2021-01-01 | End: 2021-01-01

## 2021-01-01 RX ORDER — TRIHEXYPHENIDYL HCL 2 MG
2 TABLET ORAL THREE TIMES A DAY
Refills: 0 | Status: DISCONTINUED | OUTPATIENT
Start: 2021-01-01 | End: 2021-01-01

## 2021-01-01 RX ORDER — DEXMEDETOMIDINE HYDROCHLORIDE IN 0.9% SODIUM CHLORIDE 4 UG/ML
0.2 INJECTION INTRAVENOUS
Qty: 200 | Refills: 0 | Status: DISCONTINUED | OUTPATIENT
Start: 2021-01-01 | End: 2021-01-01

## 2021-01-01 RX ORDER — QUETIAPINE FUMARATE 200 MG/1
25 TABLET, FILM COATED ORAL AT BEDTIME
Refills: 0 | Status: DISCONTINUED | OUTPATIENT
Start: 2021-01-01 | End: 2021-01-01

## 2021-01-01 RX ORDER — GLUCAGON INJECTION, SOLUTION 0.5 MG/.1ML
1 INJECTION, SOLUTION SUBCUTANEOUS ONCE
Refills: 0 | Status: DISCONTINUED | OUTPATIENT
Start: 2021-01-01 | End: 2021-01-01

## 2021-01-01 RX ORDER — INSULIN DEGLUDEC 100 U/ML
22 INJECTION, SOLUTION SUBCUTANEOUS
Qty: 0 | Refills: 0 | DISCHARGE

## 2021-01-01 RX ORDER — ACETAMINOPHEN 500 MG
975 TABLET ORAL ONCE
Refills: 0 | Status: COMPLETED | OUTPATIENT
Start: 2021-01-01 | End: 2021-01-01

## 2021-01-01 RX ORDER — HEPARIN SODIUM 5000 [USP'U]/ML
700 INJECTION INTRAVENOUS; SUBCUTANEOUS
Qty: 25000 | Refills: 0 | Status: DISCONTINUED | OUTPATIENT
Start: 2021-01-01 | End: 2021-01-01

## 2021-01-01 RX ORDER — ASCORBIC ACID 60 MG
500 TABLET,CHEWABLE ORAL DAILY
Refills: 0 | Status: DISCONTINUED | OUTPATIENT
Start: 2021-01-01 | End: 2021-01-01

## 2021-01-01 RX ORDER — ERYTHROPOIETIN 10000 [IU]/ML
10000 INJECTION, SOLUTION INTRAVENOUS; SUBCUTANEOUS ONCE
Refills: 0 | Status: COMPLETED | OUTPATIENT
Start: 2021-01-01 | End: 2021-01-01

## 2021-01-01 RX ORDER — ERYTHROPOIETIN 10000 [IU]/ML
14000 INJECTION, SOLUTION INTRAVENOUS; SUBCUTANEOUS ONCE
Refills: 0 | Status: COMPLETED | OUTPATIENT
Start: 2021-01-01 | End: 2021-01-01

## 2021-01-01 RX ORDER — CHLORHEXIDINE GLUCONATE 213 G/1000ML
1 SOLUTION TOPICAL
Refills: 0 | Status: DISCONTINUED | OUTPATIENT
Start: 2021-01-01 | End: 2021-01-01

## 2021-01-01 RX ORDER — HEPARIN SODIUM 5000 [USP'U]/ML
500 INJECTION INTRAVENOUS; SUBCUTANEOUS
Qty: 25000 | Refills: 0 | Status: DISCONTINUED | OUTPATIENT
Start: 2021-01-01 | End: 2021-01-01

## 2021-01-01 RX ORDER — ASPIRIN/CALCIUM CARB/MAGNESIUM 324 MG
1 TABLET ORAL
Qty: 0 | Refills: 0 | DISCHARGE

## 2021-01-01 RX ORDER — HALOPERIDOL DECANOATE 100 MG/ML
5 INJECTION INTRAMUSCULAR ONCE
Refills: 0 | Status: DISCONTINUED | OUTPATIENT
Start: 2021-01-01 | End: 2021-01-01

## 2021-01-01 RX ORDER — DIAZEPAM 5 MG/1
5 TABLET ORAL
Qty: 2 | Refills: 0 | Status: ACTIVE | COMMUNITY
Start: 2017-06-29 | End: 1900-01-01

## 2021-01-01 RX ORDER — CHOLECALCIFEROL (VITAMIN D3) 125 MCG
1000 CAPSULE ORAL DAILY
Refills: 0 | Status: DISCONTINUED | OUTPATIENT
Start: 2021-01-01 | End: 2021-01-01

## 2021-01-01 RX ORDER — CARBIDOPA AND LEVODOPA 25; 100 MG/1; MG/1
2 TABLET ORAL THREE TIMES A DAY
Refills: 0 | Status: DISCONTINUED | OUTPATIENT
Start: 2021-01-01 | End: 2021-01-01

## 2021-01-01 RX ORDER — QUETIAPINE FUMARATE 200 MG/1
25 TABLET, FILM COATED ORAL EVERY 6 HOURS
Refills: 0 | Status: DISCONTINUED | OUTPATIENT
Start: 2021-01-01 | End: 2021-01-01

## 2021-01-01 RX ORDER — DEXMEDETOMIDINE HYDROCHLORIDE IN 0.9% SODIUM CHLORIDE 4 UG/ML
0.75 INJECTION INTRAVENOUS
Qty: 200 | Refills: 0 | Status: DISCONTINUED | OUTPATIENT
Start: 2021-01-01 | End: 2021-01-01

## 2021-01-01 RX ORDER — METOPROLOL TARTRATE 50 MG
25 TABLET ORAL
Refills: 0 | Status: DISCONTINUED | OUTPATIENT
Start: 2021-01-01 | End: 2021-01-01

## 2021-01-01 RX ORDER — SENNA PLUS 8.6 MG/1
2 TABLET ORAL AT BEDTIME
Refills: 0 | Status: DISCONTINUED | OUTPATIENT
Start: 2021-01-01 | End: 2021-01-01

## 2021-01-01 RX ORDER — DEXTROSE 50 % IN WATER 50 %
12.5 SYRINGE (ML) INTRAVENOUS ONCE
Refills: 0 | Status: COMPLETED | OUTPATIENT
Start: 2021-01-01 | End: 2021-01-01

## 2021-01-01 RX ORDER — ENOXAPARIN SODIUM 100 MG/ML
100 INJECTION SUBCUTANEOUS DAILY
Refills: 0 | Status: DISCONTINUED | OUTPATIENT
Start: 2021-01-01 | End: 2021-01-01

## 2021-01-01 RX ORDER — INSULIN GLARGINE 100 [IU]/ML
11 INJECTION, SOLUTION SUBCUTANEOUS AT BEDTIME
Refills: 0 | Status: DISCONTINUED | OUTPATIENT
Start: 2021-01-01 | End: 2021-01-01

## 2021-01-01 RX ORDER — MORPHINE SULFATE 50 MG/1
2 CAPSULE, EXTENDED RELEASE ORAL ONCE
Refills: 0 | Status: DISCONTINUED | OUTPATIENT
Start: 2021-01-01 | End: 2021-01-01

## 2021-01-01 RX ORDER — INSULIN LISPRO 100/ML
VIAL (ML) SUBCUTANEOUS AT BEDTIME
Refills: 0 | Status: DISCONTINUED | OUTPATIENT
Start: 2021-01-01 | End: 2021-01-01

## 2021-01-01 RX ORDER — QUETIAPINE FUMARATE 200 MG/1
12.5 TABLET, FILM COATED ORAL
Refills: 0 | Status: DISCONTINUED | OUTPATIENT
Start: 2021-01-01 | End: 2021-01-01

## 2021-01-01 RX ORDER — QUETIAPINE FUMARATE 200 MG/1
12.5 TABLET, FILM COATED ORAL THREE TIMES A DAY
Refills: 0 | Status: DISCONTINUED | OUTPATIENT
Start: 2021-01-01 | End: 2021-01-01

## 2021-01-01 RX ORDER — INSULIN GLARGINE 100 [IU]/ML
6 INJECTION, SOLUTION SUBCUTANEOUS AT BEDTIME
Refills: 0 | Status: DISCONTINUED | OUTPATIENT
Start: 2021-01-01 | End: 2021-01-01

## 2021-01-01 RX ORDER — FUROSEMIDE 40 MG
60 TABLET ORAL
Refills: 0 | Status: DISCONTINUED | OUTPATIENT
Start: 2021-01-01 | End: 2021-01-01

## 2021-01-01 RX ORDER — HALOPERIDOL DECANOATE 100 MG/ML
5 INJECTION INTRAMUSCULAR ONCE
Refills: 0 | Status: COMPLETED | OUTPATIENT
Start: 2021-01-01 | End: 2021-01-01

## 2021-01-01 RX ORDER — DULOXETINE HYDROCHLORIDE 30 MG/1
20 CAPSULE, DELAYED RELEASE ORAL DAILY
Refills: 0 | Status: DISCONTINUED | OUTPATIENT
Start: 2021-01-01 | End: 2022-01-01

## 2021-01-01 RX ORDER — MIDODRINE HYDROCHLORIDE 2.5 MG/1
20 TABLET ORAL
Refills: 0 | Status: DISCONTINUED | OUTPATIENT
Start: 2021-01-01 | End: 2021-01-01

## 2021-01-01 RX ORDER — NOREPINEPHRINE BITARTRATE/D5W 8 MG/250ML
0.05 PLASTIC BAG, INJECTION (ML) INTRAVENOUS
Qty: 8 | Refills: 0 | Status: DISCONTINUED | OUTPATIENT
Start: 2021-01-01 | End: 2021-01-01

## 2021-01-01 RX ORDER — CHOLECALCIFEROL (VITAMIN D3) 125 MCG
1000 CAPSULE ORAL DAILY
Refills: 0 | Status: DISCONTINUED | OUTPATIENT
Start: 2021-01-01 | End: 2022-01-01

## 2021-01-01 RX ORDER — HEPARIN SODIUM 5000 [USP'U]/ML
9000 INJECTION INTRAVENOUS; SUBCUTANEOUS ONCE
Refills: 0 | Status: COMPLETED | OUTPATIENT
Start: 2021-01-01 | End: 2021-01-01

## 2021-01-01 RX ORDER — FOLIC ACID 0.8 MG
1 TABLET ORAL DAILY
Refills: 0 | Status: DISCONTINUED | OUTPATIENT
Start: 2021-01-01 | End: 2021-01-01

## 2021-01-01 RX ORDER — VANCOMYCIN HCL 1 G
1000 VIAL (EA) INTRAVENOUS ONCE
Refills: 0 | Status: COMPLETED | OUTPATIENT
Start: 2021-01-01 | End: 2021-01-01

## 2021-01-01 RX ORDER — DESMOPRESSIN ACETATE 0.1 MG/1
30 TABLET ORAL ONCE
Refills: 0 | Status: COMPLETED | OUTPATIENT
Start: 2021-01-01 | End: 2021-01-01

## 2021-01-01 RX ORDER — HYDROMORPHONE HYDROCHLORIDE 2 MG/ML
0.5 INJECTION INTRAMUSCULAR; INTRAVENOUS; SUBCUTANEOUS ONCE
Refills: 0 | Status: DISCONTINUED | OUTPATIENT
Start: 2021-01-01 | End: 2021-01-01

## 2021-01-01 RX ORDER — DIVALPROEX SODIUM 500 MG/1
125 TABLET, DELAYED RELEASE ORAL EVERY 8 HOURS
Refills: 0 | Status: DISCONTINUED | OUTPATIENT
Start: 2021-01-01 | End: 2021-01-01

## 2021-01-01 RX ORDER — DEXTROSE 50 % IN WATER 50 %
25 SYRINGE (ML) INTRAVENOUS ONCE
Refills: 0 | Status: COMPLETED | OUTPATIENT
Start: 2021-01-01 | End: 2021-01-01

## 2021-01-01 RX ORDER — SODIUM CHLORIDE 9 MG/ML
1000 INJECTION, SOLUTION INTRAVENOUS
Refills: 0 | Status: DISCONTINUED | OUTPATIENT
Start: 2021-01-01 | End: 2021-01-01

## 2021-01-01 RX ORDER — INSULIN LISPRO 100/ML
VIAL (ML) SUBCUTANEOUS
Refills: 0 | Status: DISCONTINUED | OUTPATIENT
Start: 2021-01-01 | End: 2021-01-01

## 2021-01-01 RX ORDER — POLYETHYLENE GLYCOL 3350 17 G/17G
17 POWDER, FOR SOLUTION ORAL
Refills: 0 | Status: DISCONTINUED | OUTPATIENT
Start: 2021-01-01 | End: 2021-01-01

## 2021-01-01 RX ORDER — IPRATROPIUM/ALBUTEROL SULFATE 18-103MCG
3 AEROSOL WITH ADAPTER (GRAM) INHALATION EVERY 6 HOURS
Refills: 0 | Status: DISCONTINUED | OUTPATIENT
Start: 2021-01-01 | End: 2021-01-01

## 2021-01-01 RX ORDER — CALCIUM ACETATE 667 MG
1334 TABLET ORAL
Refills: 0 | Status: DISCONTINUED | OUTPATIENT
Start: 2021-01-01 | End: 2021-01-01

## 2021-01-01 RX ORDER — DEXTROSE 50 % IN WATER 50 %
15 SYRINGE (ML) INTRAVENOUS ONCE
Refills: 0 | Status: DISCONTINUED | OUTPATIENT
Start: 2021-01-01 | End: 2022-01-01

## 2021-01-01 RX ORDER — DESMOPRESSIN ACETATE 0.1 MG/1
30 TABLET ORAL ONCE
Refills: 0 | Status: DISCONTINUED | OUTPATIENT
Start: 2021-01-01 | End: 2021-01-01

## 2021-01-01 RX ORDER — HEPARIN SODIUM 5000 [USP'U]/ML
4000 INJECTION INTRAVENOUS; SUBCUTANEOUS EVERY 6 HOURS
Refills: 0 | Status: DISCONTINUED | OUTPATIENT
Start: 2021-01-01 | End: 2021-01-01

## 2021-01-01 RX ORDER — ERYTHROPOIETIN 10000 [IU]/ML
10000 INJECTION, SOLUTION INTRAVENOUS; SUBCUTANEOUS ONCE
Refills: 0 | Status: CANCELLED | OUTPATIENT
Start: 2021-01-01 | End: 2021-01-01

## 2021-01-01 RX ORDER — ONDANSETRON 8 MG/1
4 TABLET, FILM COATED ORAL ONCE
Refills: 0 | Status: COMPLETED | OUTPATIENT
Start: 2021-01-01 | End: 2021-01-01

## 2021-01-01 RX ORDER — OXYCODONE HYDROCHLORIDE 5 MG/1
5 TABLET ORAL EVERY 4 HOURS
Refills: 0 | Status: DISCONTINUED | OUTPATIENT
Start: 2021-01-01 | End: 2021-01-01

## 2021-01-01 RX ORDER — INSULIN LISPRO 100/ML
VIAL (ML) SUBCUTANEOUS AT BEDTIME
Refills: 0 | Status: DISCONTINUED | OUTPATIENT
Start: 2021-01-01 | End: 2022-01-01

## 2021-01-01 RX ORDER — CARBIDOPA AND LEVODOPA 25; 100 MG/1; MG/1
2 TABLET ORAL
Refills: 0 | Status: DISCONTINUED | OUTPATIENT
Start: 2021-01-01 | End: 2021-01-01

## 2021-01-01 RX ORDER — INSULIN GLARGINE 100 [IU]/ML
3 INJECTION, SOLUTION SUBCUTANEOUS ONCE
Refills: 0 | Status: COMPLETED | OUTPATIENT
Start: 2021-01-01 | End: 2021-01-01

## 2021-01-01 RX ORDER — MIDODRINE HYDROCHLORIDE 2.5 MG/1
10 TABLET ORAL
Refills: 0 | Status: DISCONTINUED | OUTPATIENT
Start: 2021-01-01 | End: 2021-01-01

## 2021-01-01 RX ORDER — CARBIDOPA AND LEVODOPA 25; 100 MG/1; MG/1
2.5 TABLET ORAL
Refills: 0 | Status: DISCONTINUED | OUTPATIENT
Start: 2021-01-01 | End: 2021-01-01

## 2021-01-01 RX ORDER — DEXTROSE 50 % IN WATER 50 %
15 SYRINGE (ML) INTRAVENOUS ONCE
Refills: 0 | Status: DISCONTINUED | OUTPATIENT
Start: 2021-01-01 | End: 2021-01-01

## 2021-01-01 RX ORDER — MIDODRINE HYDROCHLORIDE 2.5 MG/1
20 TABLET ORAL EVERY 8 HOURS
Refills: 0 | Status: DISCONTINUED | OUTPATIENT
Start: 2021-01-01 | End: 2021-01-01

## 2021-01-01 RX ORDER — INSULIN ASPART 100 [IU]/ML
5 INJECTION, SOLUTION SUBCUTANEOUS
Qty: 0 | Refills: 0 | DISCHARGE

## 2021-01-01 RX ORDER — HALOPERIDOL DECANOATE 100 MG/ML
0.5 INJECTION INTRAMUSCULAR ONCE
Refills: 0 | Status: COMPLETED | OUTPATIENT
Start: 2021-01-01 | End: 2021-01-01

## 2021-01-01 RX ORDER — ACETAMINOPHEN 500 MG
1000 TABLET ORAL ONCE
Refills: 0 | Status: COMPLETED | OUTPATIENT
Start: 2021-01-01 | End: 2021-01-01

## 2021-01-01 RX ORDER — MEMANTINE HYDROCHLORIDE 10 MG/1
5 TABLET ORAL AT BEDTIME
Refills: 0 | Status: DISCONTINUED | OUTPATIENT
Start: 2021-01-01 | End: 2022-01-01

## 2021-01-01 RX ORDER — ESCITALOPRAM OXALATE 5 MG/1
5 TABLET ORAL DAILY
Qty: 30 | Refills: 5 | Status: ACTIVE | COMMUNITY
Start: 2021-01-01 | End: 1900-01-01

## 2021-01-01 RX ORDER — LEVOTHYROXINE SODIUM 125 MCG
25 TABLET ORAL DAILY
Refills: 0 | Status: DISCONTINUED | OUTPATIENT
Start: 2021-01-01 | End: 2021-01-01

## 2021-01-01 RX ORDER — ACETAMINOPHEN 500 MG
1000 TABLET ORAL ONCE
Refills: 0 | Status: DISCONTINUED | OUTPATIENT
Start: 2021-01-01 | End: 2021-01-01

## 2021-01-01 RX ORDER — DEXTROSE 50 % IN WATER 50 %
12.5 SYRINGE (ML) INTRAVENOUS ONCE
Refills: 0 | Status: DISCONTINUED | OUTPATIENT
Start: 2021-01-01 | End: 2022-01-01

## 2021-01-01 RX ORDER — CHLORHEXIDINE GLUCONATE 213 G/1000ML
1 SOLUTION TOPICAL DAILY
Refills: 0 | Status: DISCONTINUED | OUTPATIENT
Start: 2021-01-01 | End: 2021-01-01

## 2021-01-01 RX ORDER — ONDANSETRON 8 MG/1
4 TABLET, FILM COATED ORAL ONCE
Refills: 0 | Status: DISCONTINUED | OUTPATIENT
Start: 2021-01-01 | End: 2022-01-01

## 2021-01-01 RX ORDER — MIDODRINE HYDROCHLORIDE 2.5 MG/1
10 TABLET ORAL
Refills: 0 | Status: DISCONTINUED | OUTPATIENT
Start: 2021-01-01 | End: 2022-01-01

## 2021-01-01 RX ORDER — INSULIN LISPRO 100/ML
3 VIAL (ML) SUBCUTANEOUS
Refills: 0 | Status: DISCONTINUED | OUTPATIENT
Start: 2021-01-01 | End: 2021-01-01

## 2021-01-01 RX ORDER — PIPERACILLIN AND TAZOBACTAM 4; .5 G/20ML; G/20ML
3.38 INJECTION, POWDER, LYOPHILIZED, FOR SOLUTION INTRAVENOUS EVERY 12 HOURS
Refills: 0 | Status: DISCONTINUED | OUTPATIENT
Start: 2021-01-01 | End: 2021-01-01

## 2021-01-01 RX ORDER — CINACALCET 30 MG/1
60 TABLET, FILM COATED ORAL DAILY
Refills: 0 | Status: DISCONTINUED | OUTPATIENT
Start: 2021-01-01 | End: 2022-01-01

## 2021-01-01 RX ORDER — MIDODRINE HYDROCHLORIDE 2.5 MG/1
10 TABLET ORAL ONCE
Refills: 0 | Status: COMPLETED | OUTPATIENT
Start: 2021-01-01 | End: 2021-01-01

## 2021-01-01 RX ORDER — HALOPERIDOL DECANOATE 100 MG/ML
5 INJECTION INTRAMUSCULAR EVERY 6 HOURS
Refills: 0 | Status: DISCONTINUED | OUTPATIENT
Start: 2021-01-01 | End: 2021-01-01

## 2021-01-01 RX ORDER — THIAMINE MONONITRATE (VIT B1) 100 MG
500 TABLET ORAL THREE TIMES A DAY
Refills: 0 | Status: COMPLETED | OUTPATIENT
Start: 2021-01-01 | End: 2021-01-01

## 2021-01-01 RX ORDER — METOPROLOL TARTRATE 50 MG
1 TABLET ORAL
Qty: 0 | Refills: 0 | DISCHARGE

## 2021-01-01 RX ORDER — DIVALPROEX SODIUM 500 MG/1
250 TABLET, DELAYED RELEASE ORAL THREE TIMES A DAY
Refills: 0 | Status: DISCONTINUED | OUTPATIENT
Start: 2021-01-01 | End: 2022-01-01

## 2021-01-01 RX ORDER — VANCOMYCIN HCL 1 G
1250 VIAL (EA) INTRAVENOUS ONCE
Refills: 0 | Status: COMPLETED | OUTPATIENT
Start: 2021-01-01 | End: 2021-01-01

## 2021-01-01 RX ORDER — CHLORHEXIDINE GLUCONATE 213 G/1000ML
1 SOLUTION TOPICAL
Refills: 0 | Status: DISCONTINUED | OUTPATIENT
Start: 2021-01-01 | End: 2022-01-01

## 2021-01-01 RX ORDER — ASPIRIN/CALCIUM CARB/MAGNESIUM 324 MG
325 TABLET ORAL DAILY
Refills: 0 | Status: DISCONTINUED | OUTPATIENT
Start: 2021-01-01 | End: 2021-01-01

## 2021-01-01 RX ORDER — DIVALPROEX SODIUM 500 MG/1
250 TABLET, DELAYED RELEASE ORAL
Refills: 0 | Status: DISCONTINUED | OUTPATIENT
Start: 2021-01-01 | End: 2021-01-01

## 2021-01-01 RX ORDER — IRON SUCROSE 20 MG/ML
200 INJECTION, SOLUTION INTRAVENOUS ONCE
Refills: 0 | Status: COMPLETED | OUTPATIENT
Start: 2021-01-01 | End: 2021-01-01

## 2021-01-01 RX ORDER — TRIHEXYPHENIDYL HCL 2 MG
1 TABLET ORAL
Qty: 0 | Refills: 0 | DISCHARGE

## 2021-01-01 RX ORDER — OXYCODONE HYDROCHLORIDE 5 MG/1
5 TABLET ORAL EVERY 8 HOURS
Refills: 0 | Status: DISCONTINUED | OUTPATIENT
Start: 2021-01-01 | End: 2021-01-01

## 2021-01-01 RX ORDER — LEVOTHYROXINE SODIUM 125 MCG
50 TABLET ORAL DAILY
Refills: 0 | Status: DISCONTINUED | OUTPATIENT
Start: 2021-01-01 | End: 2022-01-01

## 2021-01-01 RX ORDER — SENNA PLUS 8.6 MG/1
10 TABLET ORAL AT BEDTIME
Refills: 0 | Status: DISCONTINUED | OUTPATIENT
Start: 2021-01-01 | End: 2021-01-01

## 2021-01-01 RX ORDER — ATORVASTATIN CALCIUM 80 MG/1
80 TABLET, FILM COATED ORAL AT BEDTIME
Refills: 0 | Status: DISCONTINUED | OUTPATIENT
Start: 2021-01-01 | End: 2022-01-01

## 2021-01-01 RX ORDER — DEXTROSE 50 % IN WATER 50 %
12.5 SYRINGE (ML) INTRAVENOUS ONCE
Refills: 0 | Status: DISCONTINUED | OUTPATIENT
Start: 2021-01-01 | End: 2021-01-01

## 2021-01-01 RX ORDER — ACETAMINOPHEN 500 MG
650 TABLET ORAL EVERY 6 HOURS
Refills: 0 | Status: COMPLETED | OUTPATIENT
Start: 2021-01-01 | End: 2021-01-01

## 2021-01-01 RX ORDER — SEVELAMER CARBONATE 2400 MG/1
1600 POWDER, FOR SUSPENSION ORAL THREE TIMES A DAY
Refills: 0 | Status: DISCONTINUED | OUTPATIENT
Start: 2021-01-01 | End: 2021-01-01

## 2021-01-01 RX ORDER — PIPERACILLIN AND TAZOBACTAM 4; .5 G/20ML; G/20ML
3.38 INJECTION, POWDER, LYOPHILIZED, FOR SOLUTION INTRAVENOUS ONCE
Refills: 0 | Status: COMPLETED | OUTPATIENT
Start: 2021-01-01 | End: 2021-01-01

## 2021-01-01 RX ORDER — ATORVASTATIN CALCIUM 80 MG/1
80 TABLET, FILM COATED ORAL AT BEDTIME
Refills: 0 | Status: DISCONTINUED | OUTPATIENT
Start: 2021-01-01 | End: 2021-01-01

## 2021-01-01 RX ORDER — ACETAMINOPHEN 500 MG
650 TABLET ORAL EVERY 6 HOURS
Refills: 0 | Status: DISCONTINUED | OUTPATIENT
Start: 2021-01-01 | End: 2022-01-01

## 2021-01-01 RX ORDER — CARBIDOPA AND LEVODOPA 25; 100 MG/1; MG/1
2 TABLET ORAL
Refills: 0 | Status: DISCONTINUED | OUTPATIENT
Start: 2021-01-01 | End: 2022-01-01

## 2021-01-01 RX ORDER — ASPIRIN/CALCIUM CARB/MAGNESIUM 324 MG
81 TABLET ORAL DAILY
Refills: 0 | Status: DISCONTINUED | OUTPATIENT
Start: 2021-01-01 | End: 2021-01-01

## 2021-01-01 RX ORDER — FUROSEMIDE 40 MG
40 TABLET ORAL ONCE
Refills: 0 | Status: COMPLETED | OUTPATIENT
Start: 2021-01-01 | End: 2021-01-01

## 2021-01-01 RX ORDER — PANTOPRAZOLE SODIUM 20 MG/1
40 TABLET, DELAYED RELEASE ORAL DAILY
Refills: 0 | Status: DISCONTINUED | OUTPATIENT
Start: 2021-01-01 | End: 2021-01-01

## 2021-01-01 RX ORDER — CALCIUM ACETATE 667 MG
667 TABLET ORAL
Refills: 0 | Status: DISCONTINUED | OUTPATIENT
Start: 2021-01-01 | End: 2021-01-01

## 2021-01-01 RX ORDER — CARBIDOPA AND LEVODOPA 25; 100 MG/1; MG/1
2.5 TABLET ORAL
Refills: 0 | Status: DISCONTINUED | OUTPATIENT
Start: 2021-01-01 | End: 2022-01-01

## 2021-01-01 RX ORDER — HEPARIN SODIUM 5000 [USP'U]/ML
5000 INJECTION INTRAVENOUS; SUBCUTANEOUS EVERY 12 HOURS
Refills: 0 | Status: DISCONTINUED | OUTPATIENT
Start: 2021-01-01 | End: 2021-01-01

## 2021-01-01 RX ORDER — DEXTROSE 50 % IN WATER 50 %
25 SYRINGE (ML) INTRAVENOUS ONCE
Refills: 0 | Status: DISCONTINUED | OUTPATIENT
Start: 2021-01-01 | End: 2021-01-01

## 2021-01-01 RX ORDER — HEPARIN SODIUM 5000 [USP'U]/ML
9000 INJECTION INTRAVENOUS; SUBCUTANEOUS EVERY 6 HOURS
Refills: 0 | Status: DISCONTINUED | OUTPATIENT
Start: 2021-01-01 | End: 2021-01-01

## 2021-01-01 RX ORDER — QUETIAPINE FUMARATE 200 MG/1
25 TABLET, FILM COATED ORAL
Refills: 0 | Status: DISCONTINUED | OUTPATIENT
Start: 2021-01-01 | End: 2021-01-01

## 2021-01-01 RX ORDER — ERYTHROPOIETIN 10000 [IU]/ML
4000 INJECTION, SOLUTION INTRAVENOUS; SUBCUTANEOUS ONCE
Refills: 0 | Status: COMPLETED | OUTPATIENT
Start: 2021-01-01 | End: 2021-01-01

## 2021-01-01 RX ORDER — INSULIN GLARGINE 100 [IU]/ML
22 INJECTION, SOLUTION SUBCUTANEOUS AT BEDTIME
Refills: 0 | Status: DISCONTINUED | OUTPATIENT
Start: 2021-01-01 | End: 2021-01-01

## 2021-01-01 RX ORDER — FUROSEMIDE 40 MG
40 TABLET ORAL ONCE
Refills: 0 | Status: DISCONTINUED | OUTPATIENT
Start: 2021-01-01 | End: 2021-01-01

## 2021-01-01 RX ORDER — IPRATROPIUM/ALBUTEROL SULFATE 18-103MCG
3 AEROSOL WITH ADAPTER (GRAM) INHALATION EVERY 6 HOURS
Refills: 0 | Status: DISCONTINUED | OUTPATIENT
Start: 2021-01-01 | End: 2022-01-01

## 2021-01-01 RX ORDER — SODIUM CHLORIDE 9 MG/ML
1000 INJECTION, SOLUTION INTRAVENOUS
Refills: 0 | Status: DISCONTINUED | OUTPATIENT
Start: 2021-01-01 | End: 2022-01-01

## 2021-01-01 RX ORDER — OLANZAPINE 15 MG/1
1.25 TABLET, FILM COATED ORAL DAILY
Refills: 0 | Status: DISCONTINUED | OUTPATIENT
Start: 2021-01-01 | End: 2021-01-01

## 2021-01-01 RX ORDER — INSULIN LISPRO 100/ML
5 VIAL (ML) SUBCUTANEOUS
Refills: 0 | Status: DISCONTINUED | OUTPATIENT
Start: 2021-01-01 | End: 2021-01-01

## 2021-01-01 RX ORDER — SENNA PLUS 8.6 MG/1
10 TABLET ORAL AT BEDTIME
Refills: 0 | Status: DISCONTINUED | OUTPATIENT
Start: 2021-01-01 | End: 2022-01-01

## 2021-01-01 RX ORDER — MUPIROCIN 20 MG/G
1 OINTMENT TOPICAL
Refills: 0 | Status: COMPLETED | OUTPATIENT
Start: 2021-01-01 | End: 2021-01-01

## 2021-01-01 RX ORDER — HEPARIN SODIUM 5000 [USP'U]/ML
INJECTION INTRAVENOUS; SUBCUTANEOUS
Qty: 25000 | Refills: 0 | Status: DISCONTINUED | OUTPATIENT
Start: 2021-01-01 | End: 2021-01-01

## 2021-01-01 RX ORDER — GLUCAGON INJECTION, SOLUTION 0.5 MG/.1ML
1 INJECTION, SOLUTION SUBCUTANEOUS ONCE
Refills: 0 | Status: DISCONTINUED | OUTPATIENT
Start: 2021-01-01 | End: 2022-01-01

## 2021-01-01 RX ORDER — MEMANTINE HYDROCHLORIDE 10 MG/1
5 TABLET ORAL
Refills: 0 | Status: DISCONTINUED | OUTPATIENT
Start: 2021-01-01 | End: 2021-01-01

## 2021-01-01 RX ORDER — DIVALPROEX SODIUM 500 MG/1
125 TABLET, DELAYED RELEASE ORAL EVERY 8 HOURS
Refills: 0 | Status: DISCONTINUED | OUTPATIENT
Start: 2021-01-01 | End: 2022-01-01

## 2021-01-01 RX ORDER — OXYCODONE HYDROCHLORIDE 5 MG/1
2.5 TABLET ORAL ONCE
Refills: 0 | Status: DISCONTINUED | OUTPATIENT
Start: 2021-01-01 | End: 2021-01-01

## 2021-01-01 RX ORDER — QUETIAPINE FUMARATE 200 MG/1
12.5 TABLET, FILM COATED ORAL EVERY 6 HOURS
Refills: 0 | Status: DISCONTINUED | OUTPATIENT
Start: 2021-01-01 | End: 2022-01-01

## 2021-01-01 RX ORDER — SENNA PLUS 8.6 MG/1
2 TABLET ORAL
Qty: 0 | Refills: 0 | DISCHARGE

## 2021-01-01 RX ORDER — TRIHEXYPHENIDYL HCL 2 MG
2 TABLET ORAL THREE TIMES A DAY
Refills: 0 | Status: DISCONTINUED | OUTPATIENT
Start: 2021-01-01 | End: 2022-01-01

## 2021-01-01 RX ORDER — METOPROLOL TARTRATE 50 MG
5 TABLET ORAL ONCE
Refills: 0 | Status: COMPLETED | OUTPATIENT
Start: 2021-01-01 | End: 2021-01-01

## 2021-01-01 RX ORDER — POLYETHYLENE GLYCOL 3350 17 G/17G
17 POWDER, FOR SOLUTION ORAL DAILY
Refills: 0 | Status: DISCONTINUED | OUTPATIENT
Start: 2021-01-01 | End: 2021-01-01

## 2021-01-01 RX ORDER — SODIUM CHLORIDE 9 MG/ML
10 INJECTION INTRAMUSCULAR; INTRAVENOUS; SUBCUTANEOUS
Refills: 0 | Status: DISCONTINUED | OUTPATIENT
Start: 2021-01-01 | End: 2022-01-01

## 2021-01-01 RX ORDER — INSULIN LISPRO 100/ML
VIAL (ML) SUBCUTANEOUS
Refills: 0 | Status: DISCONTINUED | OUTPATIENT
Start: 2021-01-01 | End: 2022-01-01

## 2021-01-01 RX ORDER — MIRTAZAPINE 45 MG/1
7.5 TABLET, ORALLY DISINTEGRATING ORAL DAILY
Refills: 0 | Status: DISCONTINUED | OUTPATIENT
Start: 2021-01-01 | End: 2022-01-01

## 2021-01-01 RX ORDER — SODIUM CHLORIDE 9 MG/ML
10 INJECTION INTRAMUSCULAR; INTRAVENOUS; SUBCUTANEOUS
Refills: 0 | Status: DISCONTINUED | OUTPATIENT
Start: 2021-01-01 | End: 2021-01-01

## 2021-01-01 RX ORDER — QUETIAPINE FUMARATE 200 MG/1
12.5 TABLET, FILM COATED ORAL DAILY
Refills: 0 | Status: DISCONTINUED | OUTPATIENT
Start: 2021-01-01 | End: 2021-01-01

## 2021-01-01 RX ORDER — HALOPERIDOL DECANOATE 100 MG/ML
0.5 INJECTION INTRAMUSCULAR ONCE
Refills: 0 | Status: DISCONTINUED | OUTPATIENT
Start: 2021-01-01 | End: 2021-01-01

## 2021-01-01 RX ORDER — INSULIN GLARGINE 100 [IU]/ML
8 INJECTION, SOLUTION SUBCUTANEOUS AT BEDTIME
Refills: 0 | Status: DISCONTINUED | OUTPATIENT
Start: 2021-01-01 | End: 2021-01-01

## 2021-01-01 RX ORDER — FOLIC ACID 0.8 MG
1 TABLET ORAL DAILY
Refills: 0 | Status: DISCONTINUED | OUTPATIENT
Start: 2021-01-01 | End: 2022-01-01

## 2021-01-01 RX ORDER — DEXTROSE 50 % IN WATER 50 %
25 SYRINGE (ML) INTRAVENOUS ONCE
Refills: 0 | Status: DISCONTINUED | OUTPATIENT
Start: 2021-01-01 | End: 2022-01-01

## 2021-01-01 RX ORDER — CHOLECALCIFEROL (VITAMIN D3) 125 MCG
1 CAPSULE ORAL
Qty: 0 | Refills: 0 | DISCHARGE

## 2021-01-01 RX ORDER — HEPARIN SODIUM 5000 [USP'U]/ML
5000 INJECTION INTRAVENOUS; SUBCUTANEOUS EVERY 12 HOURS
Refills: 0 | Status: DISCONTINUED | OUTPATIENT
Start: 2021-01-01 | End: 2022-01-01

## 2021-01-01 RX ORDER — INSULIN LISPRO 100/ML
8 VIAL (ML) SUBCUTANEOUS
Qty: 0 | Refills: 0 | DISCHARGE

## 2021-01-01 RX ORDER — MIDODRINE HYDROCHLORIDE 2.5 MG/1
10 TABLET ORAL EVERY 8 HOURS
Refills: 0 | Status: DISCONTINUED | OUTPATIENT
Start: 2021-01-01 | End: 2021-01-01

## 2021-01-01 RX ORDER — FUROSEMIDE 40 MG
60 TABLET ORAL ONCE
Refills: 0 | Status: COMPLETED | OUTPATIENT
Start: 2021-01-01 | End: 2021-01-01

## 2021-01-01 RX ORDER — HYDROMORPHONE HYDROCHLORIDE 2 MG/ML
0.25 INJECTION INTRAMUSCULAR; INTRAVENOUS; SUBCUTANEOUS
Refills: 0 | Status: DISCONTINUED | OUTPATIENT
Start: 2021-01-01 | End: 2021-01-01

## 2021-01-01 RX ORDER — HEPARIN SODIUM 5000 [USP'U]/ML
300 INJECTION INTRAVENOUS; SUBCUTANEOUS
Qty: 25000 | Refills: 0 | Status: DISCONTINUED | OUTPATIENT
Start: 2021-01-01 | End: 2021-01-01

## 2021-01-01 RX ORDER — CARBIDOPA AND LEVODOPA 25; 100 MG/1; MG/1
25-100 TABLET ORAL
Qty: 540 | Refills: 3 | Status: ACTIVE | COMMUNITY
Start: 2017-04-13 | End: 1900-01-01

## 2021-01-01 RX ORDER — POLYETHYLENE GLYCOL 3350 17 G/17G
17 POWDER, FOR SOLUTION ORAL
Refills: 0 | Status: DISCONTINUED | OUTPATIENT
Start: 2021-01-01 | End: 2022-01-01

## 2021-01-01 RX ORDER — ENOXAPARIN SODIUM 100 MG/ML
100 INJECTION SUBCUTANEOUS
Refills: 0 | Status: DISCONTINUED | OUTPATIENT
Start: 2021-01-01 | End: 2021-01-01

## 2021-01-01 RX ORDER — OXYCODONE HYDROCHLORIDE 5 MG/1
5 TABLET ORAL ONCE
Refills: 0 | Status: DISCONTINUED | OUTPATIENT
Start: 2021-01-01 | End: 2021-01-01

## 2021-01-01 RX ORDER — QUETIAPINE FUMARATE 200 MG/1
25 TABLET, FILM COATED ORAL THREE TIMES A DAY
Refills: 0 | Status: DISCONTINUED | OUTPATIENT
Start: 2021-01-01 | End: 2022-01-01

## 2021-01-01 RX ORDER — METOPROLOL TARTRATE 50 MG
50 TABLET ORAL
Refills: 0 | Status: DISCONTINUED | OUTPATIENT
Start: 2021-01-01 | End: 2022-01-01

## 2021-01-01 RX ORDER — INSULIN LISPRO 100/ML
VIAL (ML) SUBCUTANEOUS EVERY 6 HOURS
Refills: 0 | Status: DISCONTINUED | OUTPATIENT
Start: 2021-01-01 | End: 2021-01-01

## 2021-01-01 RX ORDER — SEVELAMER CARBONATE 2400 MG/1
1600 POWDER, FOR SUSPENSION ORAL
Refills: 0 | Status: DISCONTINUED | OUTPATIENT
Start: 2021-01-01 | End: 2021-01-01

## 2021-01-01 RX ADMIN — Medication 650 MILLIGRAM(S): at 13:48

## 2021-01-01 RX ADMIN — DIVALPROEX SODIUM 250 MILLIGRAM(S): 500 TABLET, DELAYED RELEASE ORAL at 05:23

## 2021-01-01 RX ADMIN — CARBIDOPA AND LEVODOPA 2.5 TABLET(S): 25; 100 TABLET ORAL at 05:47

## 2021-01-01 RX ADMIN — CHLORHEXIDINE GLUCONATE 1 APPLICATION(S): 213 SOLUTION TOPICAL at 05:04

## 2021-01-01 RX ADMIN — Medication 25 MICROGRAM(S): at 05:29

## 2021-01-01 RX ADMIN — CARBIDOPA AND LEVODOPA 2 TABLET(S): 25; 100 TABLET ORAL at 22:23

## 2021-01-01 RX ADMIN — POLYETHYLENE GLYCOL 3350 17 GRAM(S): 17 POWDER, FOR SOLUTION ORAL at 18:08

## 2021-01-01 RX ADMIN — Medication 2 MILLIGRAM(S): at 06:49

## 2021-01-01 RX ADMIN — POLYETHYLENE GLYCOL 3350 17 GRAM(S): 17 POWDER, FOR SOLUTION ORAL at 17:48

## 2021-01-01 RX ADMIN — Medication 2 MILLIGRAM(S): at 05:21

## 2021-01-01 RX ADMIN — Medication 2 MILLIGRAM(S): at 13:03

## 2021-01-01 RX ADMIN — Medication 25 MILLIGRAM(S): at 18:14

## 2021-01-01 RX ADMIN — MEMANTINE HYDROCHLORIDE 5 MILLIGRAM(S): 10 TABLET ORAL at 22:44

## 2021-01-01 RX ADMIN — QUETIAPINE FUMARATE 12.5 MILLIGRAM(S): 200 TABLET, FILM COATED ORAL at 13:54

## 2021-01-01 RX ADMIN — QUETIAPINE FUMARATE 12.5 MILLIGRAM(S): 200 TABLET, FILM COATED ORAL at 05:57

## 2021-01-01 RX ADMIN — SENNA PLUS 10 MILLILITER(S): 8.6 TABLET ORAL at 21:28

## 2021-01-01 RX ADMIN — MEMANTINE HYDROCHLORIDE 5 MILLIGRAM(S): 10 TABLET ORAL at 21:30

## 2021-01-01 RX ADMIN — Medication 650 MILLIGRAM(S): at 22:56

## 2021-01-01 RX ADMIN — QUETIAPINE FUMARATE 25 MILLIGRAM(S): 200 TABLET, FILM COATED ORAL at 05:01

## 2021-01-01 RX ADMIN — INSULIN GLARGINE 6 UNIT(S): 100 INJECTION, SOLUTION SUBCUTANEOUS at 23:08

## 2021-01-01 RX ADMIN — ERYTHROPOIETIN 10000 UNIT(S): 10000 INJECTION, SOLUTION INTRAVENOUS; SUBCUTANEOUS at 10:23

## 2021-01-01 RX ADMIN — CARBIDOPA AND LEVODOPA 2 TABLET(S): 25; 100 TABLET ORAL at 22:49

## 2021-01-01 RX ADMIN — MIDODRINE HYDROCHLORIDE 20 MILLIGRAM(S): 2.5 TABLET ORAL at 12:23

## 2021-01-01 RX ADMIN — POLYETHYLENE GLYCOL 3350 17 GRAM(S): 17 POWDER, FOR SOLUTION ORAL at 06:19

## 2021-01-01 RX ADMIN — Medication 1000 UNIT(S): at 13:50

## 2021-01-01 RX ADMIN — POLYETHYLENE GLYCOL 3350 17 GRAM(S): 17 POWDER, FOR SOLUTION ORAL at 05:39

## 2021-01-01 RX ADMIN — Medication 500 MILLIGRAM(S): at 12:13

## 2021-01-01 RX ADMIN — INSULIN GLARGINE 6 UNIT(S): 100 INJECTION, SOLUTION SUBCUTANEOUS at 22:24

## 2021-01-01 RX ADMIN — INSULIN GLARGINE 8 UNIT(S): 100 INJECTION, SOLUTION SUBCUTANEOUS at 22:04

## 2021-01-01 RX ADMIN — CARBIDOPA AND LEVODOPA 2 TABLET(S): 25; 100 TABLET ORAL at 21:54

## 2021-01-01 RX ADMIN — CINACALCET 60 MILLIGRAM(S): 30 TABLET, FILM COATED ORAL at 09:28

## 2021-01-01 RX ADMIN — MEMANTINE HYDROCHLORIDE 5 MILLIGRAM(S): 10 TABLET ORAL at 21:38

## 2021-01-01 RX ADMIN — QUETIAPINE FUMARATE 25 MILLIGRAM(S): 200 TABLET, FILM COATED ORAL at 21:56

## 2021-01-01 RX ADMIN — QUETIAPINE FUMARATE 25 MILLIGRAM(S): 200 TABLET, FILM COATED ORAL at 22:57

## 2021-01-01 RX ADMIN — Medication 1: at 08:43

## 2021-01-01 RX ADMIN — CHLORHEXIDINE GLUCONATE 1 APPLICATION(S): 213 SOLUTION TOPICAL at 06:40

## 2021-01-01 RX ADMIN — POLYETHYLENE GLYCOL 3350 17 GRAM(S): 17 POWDER, FOR SOLUTION ORAL at 17:32

## 2021-01-01 RX ADMIN — Medication 2 MILLIGRAM(S): at 13:23

## 2021-01-01 RX ADMIN — POLYETHYLENE GLYCOL 3350 17 GRAM(S): 17 POWDER, FOR SOLUTION ORAL at 06:09

## 2021-01-01 RX ADMIN — CARBIDOPA AND LEVODOPA 2.5 TABLET(S): 25; 100 TABLET ORAL at 05:35

## 2021-01-01 RX ADMIN — INSULIN GLARGINE 6 UNIT(S): 100 INJECTION, SOLUTION SUBCUTANEOUS at 22:38

## 2021-01-01 RX ADMIN — MEMANTINE HYDROCHLORIDE 5 MILLIGRAM(S): 10 TABLET ORAL at 21:44

## 2021-01-01 RX ADMIN — MIDODRINE HYDROCHLORIDE 10 MILLIGRAM(S): 2.5 TABLET ORAL at 12:50

## 2021-01-01 RX ADMIN — DIVALPROEX SODIUM 250 MILLIGRAM(S): 500 TABLET, DELAYED RELEASE ORAL at 06:55

## 2021-01-01 RX ADMIN — Medication 25 MILLIGRAM(S): at 05:36

## 2021-01-01 RX ADMIN — QUETIAPINE FUMARATE 12.5 MILLIGRAM(S): 200 TABLET, FILM COATED ORAL at 15:39

## 2021-01-01 RX ADMIN — Medication 1 TABLET(S): at 12:13

## 2021-01-01 RX ADMIN — MEMANTINE HYDROCHLORIDE 5 MILLIGRAM(S): 10 TABLET ORAL at 21:12

## 2021-01-01 RX ADMIN — CARBIDOPA AND LEVODOPA 2.5 TABLET(S): 25; 100 TABLET ORAL at 14:26

## 2021-01-01 RX ADMIN — Medication 25 MICROGRAM(S): at 06:38

## 2021-01-01 RX ADMIN — ATORVASTATIN CALCIUM 80 MILLIGRAM(S): 80 TABLET, FILM COATED ORAL at 21:41

## 2021-01-01 RX ADMIN — CARBIDOPA AND LEVODOPA 2.5 TABLET(S): 25; 100 TABLET ORAL at 14:09

## 2021-01-01 RX ADMIN — QUETIAPINE FUMARATE 25 MILLIGRAM(S): 200 TABLET, FILM COATED ORAL at 21:47

## 2021-01-01 RX ADMIN — CARBIDOPA AND LEVODOPA 2.5 TABLET(S): 25; 100 TABLET ORAL at 14:12

## 2021-01-01 RX ADMIN — CARBIDOPA AND LEVODOPA 2.5 TABLET(S): 25; 100 TABLET ORAL at 06:51

## 2021-01-01 RX ADMIN — Medication 1334 MILLIGRAM(S): at 12:13

## 2021-01-01 RX ADMIN — DIVALPROEX SODIUM 250 MILLIGRAM(S): 500 TABLET, DELAYED RELEASE ORAL at 22:13

## 2021-01-01 RX ADMIN — QUETIAPINE FUMARATE 12.5 MILLIGRAM(S): 200 TABLET, FILM COATED ORAL at 13:57

## 2021-01-01 RX ADMIN — Medication 1000 UNIT(S): at 13:31

## 2021-01-01 RX ADMIN — Medication 1000 UNIT(S): at 11:57

## 2021-01-01 RX ADMIN — Medication 3 UNIT(S): at 08:21

## 2021-01-01 RX ADMIN — MEMANTINE HYDROCHLORIDE 5 MILLIGRAM(S): 10 TABLET ORAL at 05:01

## 2021-01-01 RX ADMIN — Medication 650 MILLIGRAM(S): at 03:43

## 2021-01-01 RX ADMIN — CINACALCET 60 MILLIGRAM(S): 30 TABLET, FILM COATED ORAL at 13:53

## 2021-01-01 RX ADMIN — QUETIAPINE FUMARATE 25 MILLIGRAM(S): 200 TABLET, FILM COATED ORAL at 21:36

## 2021-01-01 RX ADMIN — DIVALPROEX SODIUM 250 MILLIGRAM(S): 500 TABLET, DELAYED RELEASE ORAL at 05:20

## 2021-01-01 RX ADMIN — INSULIN GLARGINE 8 UNIT(S): 100 INJECTION, SOLUTION SUBCUTANEOUS at 22:06

## 2021-01-01 RX ADMIN — MIRTAZAPINE 7.5 MILLIGRAM(S): 45 TABLET, ORALLY DISINTEGRATING ORAL at 10:44

## 2021-01-01 RX ADMIN — CARBIDOPA AND LEVODOPA 2 TABLET(S): 25; 100 TABLET ORAL at 21:49

## 2021-01-01 RX ADMIN — Medication 1: at 08:34

## 2021-01-01 RX ADMIN — CARBIDOPA AND LEVODOPA 2 TABLET(S): 25; 100 TABLET ORAL at 21:35

## 2021-01-01 RX ADMIN — Medication 25 MICROGRAM(S): at 06:55

## 2021-01-01 RX ADMIN — SENNA PLUS 10 MILLILITER(S): 8.6 TABLET ORAL at 21:08

## 2021-01-01 RX ADMIN — Medication 2 MILLIGRAM(S): at 12:48

## 2021-01-01 RX ADMIN — PIPERACILLIN AND TAZOBACTAM 25 GRAM(S): 4; .5 INJECTION, POWDER, LYOPHILIZED, FOR SOLUTION INTRAVENOUS at 18:15

## 2021-01-01 RX ADMIN — ATORVASTATIN CALCIUM 80 MILLIGRAM(S): 80 TABLET, FILM COATED ORAL at 22:45

## 2021-01-01 RX ADMIN — Medication 1 TABLET(S): at 11:46

## 2021-01-01 RX ADMIN — Medication 1000 UNIT(S): at 13:37

## 2021-01-01 RX ADMIN — Medication 1 MILLIGRAM(S): at 12:10

## 2021-01-01 RX ADMIN — CARBIDOPA AND LEVODOPA 2.5 TABLET(S): 25; 100 TABLET ORAL at 13:49

## 2021-01-01 RX ADMIN — MUPIROCIN 1 APPLICATION(S): 20 OINTMENT TOPICAL at 05:58

## 2021-01-01 RX ADMIN — Medication 2 MILLIGRAM(S): at 13:56

## 2021-01-01 RX ADMIN — MIDODRINE HYDROCHLORIDE 20 MILLIGRAM(S): 2.5 TABLET ORAL at 14:09

## 2021-01-01 RX ADMIN — INSULIN GLARGINE 8 UNIT(S): 100 INJECTION, SOLUTION SUBCUTANEOUS at 21:43

## 2021-01-01 RX ADMIN — OXYCODONE HYDROCHLORIDE 5 MILLIGRAM(S): 5 TABLET ORAL at 11:40

## 2021-01-01 RX ADMIN — DIVALPROEX SODIUM 250 MILLIGRAM(S): 500 TABLET, DELAYED RELEASE ORAL at 13:41

## 2021-01-01 RX ADMIN — Medication 2: at 17:44

## 2021-01-01 RX ADMIN — Medication 2 MILLIGRAM(S): at 06:00

## 2021-01-01 RX ADMIN — DIVALPROEX SODIUM 250 MILLIGRAM(S): 500 TABLET, DELAYED RELEASE ORAL at 06:47

## 2021-01-01 RX ADMIN — Medication 1000 UNIT(S): at 12:35

## 2021-01-01 RX ADMIN — QUETIAPINE FUMARATE 12.5 MILLIGRAM(S): 200 TABLET, FILM COATED ORAL at 05:53

## 2021-01-01 RX ADMIN — HYDROMORPHONE HYDROCHLORIDE 0.5 MILLIGRAM(S): 2 INJECTION INTRAMUSCULAR; INTRAVENOUS; SUBCUTANEOUS at 09:19

## 2021-01-01 RX ADMIN — ERYTHROPOIETIN 10000 UNIT(S): 10000 INJECTION, SOLUTION INTRAVENOUS; SUBCUTANEOUS at 16:16

## 2021-01-01 RX ADMIN — Medication 2: at 17:59

## 2021-01-01 RX ADMIN — Medication 2 MILLIGRAM(S): at 13:10

## 2021-01-01 RX ADMIN — MIDODRINE HYDROCHLORIDE 20 MILLIGRAM(S): 2.5 TABLET ORAL at 11:41

## 2021-01-01 RX ADMIN — CARBIDOPA AND LEVODOPA 2 TABLET(S): 25; 100 TABLET ORAL at 21:36

## 2021-01-01 RX ADMIN — Medication 25 MICROGRAM(S): at 05:53

## 2021-01-01 RX ADMIN — CARBIDOPA AND LEVODOPA 2 TABLET(S): 25; 100 TABLET ORAL at 21:47

## 2021-01-01 RX ADMIN — QUETIAPINE FUMARATE 12.5 MILLIGRAM(S): 200 TABLET, FILM COATED ORAL at 05:47

## 2021-01-01 RX ADMIN — CARBIDOPA AND LEVODOPA 2.5 TABLET(S): 25; 100 TABLET ORAL at 05:09

## 2021-01-01 RX ADMIN — Medication 667 MILLIGRAM(S): at 17:16

## 2021-01-01 RX ADMIN — CINACALCET 60 MILLIGRAM(S): 30 TABLET, FILM COATED ORAL at 10:12

## 2021-01-01 RX ADMIN — CARBIDOPA AND LEVODOPA 2.5 TABLET(S): 25; 100 TABLET ORAL at 13:02

## 2021-01-01 RX ADMIN — MEMANTINE HYDROCHLORIDE 5 MILLIGRAM(S): 10 TABLET ORAL at 22:02

## 2021-01-01 RX ADMIN — Medication 2 MILLIGRAM(S): at 06:07

## 2021-01-01 RX ADMIN — Medication 2 MILLIGRAM(S): at 05:30

## 2021-01-01 RX ADMIN — Medication 650 MILLIGRAM(S): at 05:56

## 2021-01-01 RX ADMIN — CARBIDOPA AND LEVODOPA 2 TABLET(S): 25; 100 TABLET ORAL at 21:52

## 2021-01-01 RX ADMIN — Medication 25 MILLIGRAM(S): at 21:13

## 2021-01-01 RX ADMIN — ERYTHROPOIETIN 10000 UNIT(S): 10000 INJECTION, SOLUTION INTRAVENOUS; SUBCUTANEOUS at 14:12

## 2021-01-01 RX ADMIN — Medication 2 MILLIGRAM(S): at 22:44

## 2021-01-01 RX ADMIN — Medication 1334 MILLIGRAM(S): at 08:42

## 2021-01-01 RX ADMIN — Medication 1000 UNIT(S): at 15:07

## 2021-01-01 RX ADMIN — Medication 2 MILLIGRAM(S): at 14:24

## 2021-01-01 RX ADMIN — Medication 200 MILLIGRAM(S): at 23:57

## 2021-01-01 RX ADMIN — Medication 1 TABLET(S): at 12:25

## 2021-01-01 RX ADMIN — Medication 1: at 18:11

## 2021-01-01 RX ADMIN — QUETIAPINE FUMARATE 12.5 MILLIGRAM(S): 200 TABLET, FILM COATED ORAL at 13:32

## 2021-01-01 RX ADMIN — DIVALPROEX SODIUM 250 MILLIGRAM(S): 500 TABLET, DELAYED RELEASE ORAL at 18:29

## 2021-01-01 RX ADMIN — Medication 1000 UNIT(S): at 10:12

## 2021-01-01 RX ADMIN — Medication 25 MICROGRAM(S): at 05:09

## 2021-01-01 RX ADMIN — POLYETHYLENE GLYCOL 3350 17 GRAM(S): 17 POWDER, FOR SOLUTION ORAL at 19:00

## 2021-01-01 RX ADMIN — Medication 60 MILLIGRAM(S): at 05:29

## 2021-01-01 RX ADMIN — ATORVASTATIN CALCIUM 80 MILLIGRAM(S): 80 TABLET, FILM COATED ORAL at 22:30

## 2021-01-01 RX ADMIN — Medication 60 MILLIGRAM(S): at 17:16

## 2021-01-01 RX ADMIN — Medication 1000 UNIT(S): at 12:14

## 2021-01-01 RX ADMIN — CARBIDOPA AND LEVODOPA 2.5 TABLET(S): 25; 100 TABLET ORAL at 12:13

## 2021-01-01 RX ADMIN — Medication 1 MILLIGRAM(S): at 11:53

## 2021-01-01 RX ADMIN — Medication 3 MILLILITER(S): at 11:03

## 2021-01-01 RX ADMIN — CHLORHEXIDINE GLUCONATE 1 APPLICATION(S): 213 SOLUTION TOPICAL at 05:51

## 2021-01-01 RX ADMIN — Medication 1: at 08:13

## 2021-01-01 RX ADMIN — SENNA PLUS 10 MILLILITER(S): 8.6 TABLET ORAL at 22:41

## 2021-01-01 RX ADMIN — Medication 650 MILLIGRAM(S): at 14:39

## 2021-01-01 RX ADMIN — ATORVASTATIN CALCIUM 80 MILLIGRAM(S): 80 TABLET, FILM COATED ORAL at 21:59

## 2021-01-01 RX ADMIN — Medication 1 MILLIGRAM(S): at 12:35

## 2021-01-01 RX ADMIN — Medication 667 MILLIGRAM(S): at 17:40

## 2021-01-01 RX ADMIN — HEPARIN SODIUM 7 UNIT(S)/HR: 5000 INJECTION INTRAVENOUS; SUBCUTANEOUS at 12:00

## 2021-01-01 RX ADMIN — Medication 1 MILLIGRAM(S): at 13:40

## 2021-01-01 RX ADMIN — QUETIAPINE FUMARATE 12.5 MILLIGRAM(S): 200 TABLET, FILM COATED ORAL at 06:36

## 2021-01-01 RX ADMIN — Medication 2: at 11:57

## 2021-01-01 RX ADMIN — Medication 650 MILLIGRAM(S): at 00:15

## 2021-01-01 RX ADMIN — Medication 2 MILLIGRAM(S): at 13:42

## 2021-01-01 RX ADMIN — Medication 2 MILLIGRAM(S): at 13:50

## 2021-01-01 RX ADMIN — Medication 650 MILLIGRAM(S): at 05:33

## 2021-01-01 RX ADMIN — POLYETHYLENE GLYCOL 3350 17 GRAM(S): 17 POWDER, FOR SOLUTION ORAL at 17:08

## 2021-01-01 RX ADMIN — QUETIAPINE FUMARATE 25 MILLIGRAM(S): 200 TABLET, FILM COATED ORAL at 06:56

## 2021-01-01 RX ADMIN — Medication 25 MILLIGRAM(S): at 05:31

## 2021-01-01 RX ADMIN — ERYTHROPOIETIN 10000 UNIT(S): 10000 INJECTION, SOLUTION INTRAVENOUS; SUBCUTANEOUS at 10:36

## 2021-01-01 RX ADMIN — CARBIDOPA AND LEVODOPA 2 TABLET(S): 25; 100 TABLET ORAL at 21:29

## 2021-01-01 RX ADMIN — Medication 1 MILLIGRAM(S): at 11:41

## 2021-01-01 RX ADMIN — PANTOPRAZOLE SODIUM 40 MILLIGRAM(S): 20 TABLET, DELAYED RELEASE ORAL at 11:13

## 2021-01-01 RX ADMIN — Medication 2 MILLIGRAM(S): at 06:10

## 2021-01-01 RX ADMIN — Medication 2 MILLIGRAM(S): at 13:49

## 2021-01-01 RX ADMIN — Medication 1: at 17:36

## 2021-01-01 RX ADMIN — MEMANTINE HYDROCHLORIDE 5 MILLIGRAM(S): 10 TABLET ORAL at 21:29

## 2021-01-01 RX ADMIN — Medication 3 MILLILITER(S): at 11:46

## 2021-01-01 RX ADMIN — Medication 3 UNIT(S): at 12:53

## 2021-01-01 RX ADMIN — ERYTHROPOIETIN 10000 UNIT(S): 10000 INJECTION, SOLUTION INTRAVENOUS; SUBCUTANEOUS at 10:15

## 2021-01-01 RX ADMIN — QUETIAPINE FUMARATE 12.5 MILLIGRAM(S): 200 TABLET, FILM COATED ORAL at 06:46

## 2021-01-01 RX ADMIN — QUETIAPINE FUMARATE 25 MILLIGRAM(S): 200 TABLET, FILM COATED ORAL at 21:05

## 2021-01-01 RX ADMIN — QUETIAPINE FUMARATE 25 MILLIGRAM(S): 200 TABLET, FILM COATED ORAL at 13:40

## 2021-01-01 RX ADMIN — Medication 1000 UNIT(S): at 17:39

## 2021-01-01 RX ADMIN — Medication 2 MILLIGRAM(S): at 21:38

## 2021-01-01 RX ADMIN — CARBIDOPA AND LEVODOPA 2 TABLET(S): 25; 100 TABLET ORAL at 21:06

## 2021-01-01 RX ADMIN — CARBIDOPA AND LEVODOPA 2 TABLET(S): 25; 100 TABLET ORAL at 14:01

## 2021-01-01 RX ADMIN — OXYCODONE HYDROCHLORIDE 5 MILLIGRAM(S): 5 TABLET ORAL at 04:35

## 2021-01-01 RX ADMIN — MIDODRINE HYDROCHLORIDE 20 MILLIGRAM(S): 2.5 TABLET ORAL at 19:00

## 2021-01-01 RX ADMIN — Medication 2 MILLIGRAM(S): at 21:59

## 2021-01-01 RX ADMIN — Medication 2 MILLIGRAM(S): at 21:18

## 2021-01-01 RX ADMIN — POLYETHYLENE GLYCOL 3350 17 GRAM(S): 17 POWDER, FOR SOLUTION ORAL at 17:07

## 2021-01-01 RX ADMIN — DIVALPROEX SODIUM 250 MILLIGRAM(S): 500 TABLET, DELAYED RELEASE ORAL at 05:54

## 2021-01-01 RX ADMIN — CARBIDOPA AND LEVODOPA 2.5 TABLET(S): 25; 100 TABLET ORAL at 06:35

## 2021-01-01 RX ADMIN — Medication 1 TABLET(S): at 15:07

## 2021-01-01 RX ADMIN — DIVALPROEX SODIUM 250 MILLIGRAM(S): 500 TABLET, DELAYED RELEASE ORAL at 21:46

## 2021-01-01 RX ADMIN — Medication 25 MICROGRAM(S): at 05:39

## 2021-01-01 RX ADMIN — POLYETHYLENE GLYCOL 3350 17 GRAM(S): 17 POWDER, FOR SOLUTION ORAL at 05:09

## 2021-01-01 RX ADMIN — MEMANTINE HYDROCHLORIDE 5 MILLIGRAM(S): 10 TABLET ORAL at 21:45

## 2021-01-01 RX ADMIN — Medication 1 TABLET(S): at 11:39

## 2021-01-01 RX ADMIN — Medication 1 MILLIGRAM(S): at 12:13

## 2021-01-01 RX ADMIN — INSULIN GLARGINE 6 UNIT(S): 100 INJECTION, SOLUTION SUBCUTANEOUS at 22:11

## 2021-01-01 RX ADMIN — ATORVASTATIN CALCIUM 80 MILLIGRAM(S): 80 TABLET, FILM COATED ORAL at 22:46

## 2021-01-01 RX ADMIN — Medication 2 MILLIGRAM(S): at 22:16

## 2021-01-01 RX ADMIN — CARBIDOPA AND LEVODOPA 2.5 TABLET(S): 25; 100 TABLET ORAL at 05:57

## 2021-01-01 RX ADMIN — CARBIDOPA AND LEVODOPA 2.5 TABLET(S): 25; 100 TABLET ORAL at 05:37

## 2021-01-01 RX ADMIN — MEMANTINE HYDROCHLORIDE 5 MILLIGRAM(S): 10 TABLET ORAL at 22:08

## 2021-01-01 RX ADMIN — Medication 650 MILLIGRAM(S): at 14:50

## 2021-01-01 RX ADMIN — QUETIAPINE FUMARATE 12.5 MILLIGRAM(S): 200 TABLET, FILM COATED ORAL at 08:08

## 2021-01-01 RX ADMIN — CHLORHEXIDINE GLUCONATE 1 APPLICATION(S): 213 SOLUTION TOPICAL at 05:39

## 2021-01-01 RX ADMIN — OXYCODONE HYDROCHLORIDE 5 MILLIGRAM(S): 5 TABLET ORAL at 16:45

## 2021-01-01 RX ADMIN — CARBIDOPA AND LEVODOPA 2.5 TABLET(S): 25; 100 TABLET ORAL at 11:58

## 2021-01-01 RX ADMIN — Medication 105 MILLIGRAM(S): at 13:24

## 2021-01-01 RX ADMIN — Medication 0: at 21:22

## 2021-01-01 RX ADMIN — CARBIDOPA AND LEVODOPA 2.5 TABLET(S): 25; 100 TABLET ORAL at 05:53

## 2021-01-01 RX ADMIN — DIVALPROEX SODIUM 250 MILLIGRAM(S): 500 TABLET, DELAYED RELEASE ORAL at 13:21

## 2021-01-01 RX ADMIN — CARBIDOPA AND LEVODOPA 2 TABLET(S): 25; 100 TABLET ORAL at 21:34

## 2021-01-01 RX ADMIN — SENNA PLUS 10 MILLILITER(S): 8.6 TABLET ORAL at 22:03

## 2021-01-01 RX ADMIN — INSULIN GLARGINE 6 UNIT(S): 100 INJECTION, SOLUTION SUBCUTANEOUS at 21:29

## 2021-01-01 RX ADMIN — Medication 200 MILLIGRAM(S): at 05:45

## 2021-01-01 RX ADMIN — INSULIN GLARGINE 6 UNIT(S): 100 INJECTION, SOLUTION SUBCUTANEOUS at 21:46

## 2021-01-01 RX ADMIN — QUETIAPINE FUMARATE 25 MILLIGRAM(S): 200 TABLET, FILM COATED ORAL at 22:54

## 2021-01-01 RX ADMIN — CARBIDOPA AND LEVODOPA 2 TABLET(S): 25; 100 TABLET ORAL at 22:14

## 2021-01-01 RX ADMIN — Medication 1: at 12:15

## 2021-01-01 RX ADMIN — QUETIAPINE FUMARATE 25 MILLIGRAM(S): 200 TABLET, FILM COATED ORAL at 22:13

## 2021-01-01 RX ADMIN — Medication 3 MILLILITER(S): at 05:11

## 2021-01-01 RX ADMIN — Medication 25 MICROGRAM(S): at 06:53

## 2021-01-01 RX ADMIN — Medication 325 MILLIGRAM(S): at 11:44

## 2021-01-01 RX ADMIN — Medication 650 MILLIGRAM(S): at 02:28

## 2021-01-01 RX ADMIN — SEVELAMER CARBONATE 1600 MILLIGRAM(S): 2400 POWDER, FOR SUSPENSION ORAL at 05:31

## 2021-01-01 RX ADMIN — Medication 1 TABLET(S): at 12:27

## 2021-01-01 RX ADMIN — Medication 1: at 00:43

## 2021-01-01 RX ADMIN — QUETIAPINE FUMARATE 12.5 MILLIGRAM(S): 200 TABLET, FILM COATED ORAL at 21:47

## 2021-01-01 RX ADMIN — QUETIAPINE FUMARATE 12.5 MILLIGRAM(S): 200 TABLET, FILM COATED ORAL at 13:06

## 2021-01-01 RX ADMIN — POLYETHYLENE GLYCOL 3350 17 GRAM(S): 17 POWDER, FOR SOLUTION ORAL at 05:43

## 2021-01-01 RX ADMIN — MEMANTINE HYDROCHLORIDE 5 MILLIGRAM(S): 10 TABLET ORAL at 21:05

## 2021-01-01 RX ADMIN — MEMANTINE HYDROCHLORIDE 5 MILLIGRAM(S): 10 TABLET ORAL at 21:19

## 2021-01-01 RX ADMIN — Medication 50 MILLIGRAM(S): at 06:30

## 2021-01-01 RX ADMIN — ATORVASTATIN CALCIUM 80 MILLIGRAM(S): 80 TABLET, FILM COATED ORAL at 21:45

## 2021-01-01 RX ADMIN — ATORVASTATIN CALCIUM 80 MILLIGRAM(S): 80 TABLET, FILM COATED ORAL at 21:13

## 2021-01-01 RX ADMIN — QUETIAPINE FUMARATE 12.5 MILLIGRAM(S): 200 TABLET, FILM COATED ORAL at 05:09

## 2021-01-01 RX ADMIN — CARBIDOPA AND LEVODOPA 2.5 TABLET(S): 25; 100 TABLET ORAL at 14:17

## 2021-01-01 RX ADMIN — Medication 500 MILLIGRAM(S): at 11:41

## 2021-01-01 RX ADMIN — CARBIDOPA AND LEVODOPA 2 TABLET(S): 25; 100 TABLET ORAL at 22:29

## 2021-01-01 RX ADMIN — Medication 1: at 17:15

## 2021-01-01 RX ADMIN — ATORVASTATIN CALCIUM 80 MILLIGRAM(S): 80 TABLET, FILM COATED ORAL at 22:08

## 2021-01-01 RX ADMIN — Medication 25 MILLIGRAM(S): at 17:44

## 2021-01-01 RX ADMIN — INSULIN GLARGINE 6 UNIT(S): 100 INJECTION, SOLUTION SUBCUTANEOUS at 21:35

## 2021-01-01 RX ADMIN — SEVELAMER CARBONATE 1600 MILLIGRAM(S): 2400 POWDER, FOR SUSPENSION ORAL at 11:47

## 2021-01-01 RX ADMIN — DIVALPROEX SODIUM 250 MILLIGRAM(S): 500 TABLET, DELAYED RELEASE ORAL at 05:31

## 2021-01-01 RX ADMIN — SEVELAMER CARBONATE 1600 MILLIGRAM(S): 2400 POWDER, FOR SUSPENSION ORAL at 06:23

## 2021-01-01 RX ADMIN — Medication 1 TABLET(S): at 12:52

## 2021-01-01 RX ADMIN — Medication 1: at 12:07

## 2021-01-01 RX ADMIN — Medication 650 MILLIGRAM(S): at 05:15

## 2021-01-01 RX ADMIN — Medication 1 MILLIGRAM(S): at 11:46

## 2021-01-01 RX ADMIN — CHLORHEXIDINE GLUCONATE 1 APPLICATION(S): 213 SOLUTION TOPICAL at 11:45

## 2021-01-01 RX ADMIN — CARBIDOPA AND LEVODOPA 2.5 TABLET(S): 25; 100 TABLET ORAL at 13:12

## 2021-01-01 RX ADMIN — Medication 25 MILLIGRAM(S): at 05:22

## 2021-01-01 RX ADMIN — Medication 25 MICROGRAM(S): at 05:38

## 2021-01-01 RX ADMIN — CARBIDOPA AND LEVODOPA 2.5 TABLET(S): 25; 100 TABLET ORAL at 06:06

## 2021-01-01 RX ADMIN — MEMANTINE HYDROCHLORIDE 5 MILLIGRAM(S): 10 TABLET ORAL at 21:52

## 2021-01-01 RX ADMIN — Medication 200 MILLIGRAM(S): at 22:47

## 2021-01-01 RX ADMIN — Medication 50 MICROGRAM(S): at 06:36

## 2021-01-01 RX ADMIN — QUETIAPINE FUMARATE 25 MILLIGRAM(S): 200 TABLET, FILM COATED ORAL at 22:08

## 2021-01-01 RX ADMIN — Medication 2 MILLIGRAM(S): at 05:26

## 2021-01-01 RX ADMIN — CARBIDOPA AND LEVODOPA 2.5 TABLET(S): 25; 100 TABLET ORAL at 06:56

## 2021-01-01 RX ADMIN — DIVALPROEX SODIUM 250 MILLIGRAM(S): 500 TABLET, DELAYED RELEASE ORAL at 05:30

## 2021-01-01 RX ADMIN — Medication 1 TABLET(S): at 13:52

## 2021-01-01 RX ADMIN — Medication 1 TABLET(S): at 13:02

## 2021-01-01 RX ADMIN — QUETIAPINE FUMARATE 25 MILLIGRAM(S): 200 TABLET, FILM COATED ORAL at 22:28

## 2021-01-01 RX ADMIN — ERYTHROPOIETIN 10000 UNIT(S): 10000 INJECTION, SOLUTION INTRAVENOUS; SUBCUTANEOUS at 17:14

## 2021-01-01 RX ADMIN — Medication 25 MILLIGRAM(S): at 17:03

## 2021-01-01 RX ADMIN — Medication 2 MILLIGRAM(S): at 13:21

## 2021-01-01 RX ADMIN — Medication 50 MILLIGRAM(S): at 22:44

## 2021-01-01 RX ADMIN — QUETIAPINE FUMARATE 25 MILLIGRAM(S): 200 TABLET, FILM COATED ORAL at 13:08

## 2021-01-01 RX ADMIN — Medication 1 MILLIGRAM(S): at 13:23

## 2021-01-01 RX ADMIN — CHLORHEXIDINE GLUCONATE 1 APPLICATION(S): 213 SOLUTION TOPICAL at 06:02

## 2021-01-01 RX ADMIN — INSULIN GLARGINE 6 UNIT(S): 100 INJECTION, SOLUTION SUBCUTANEOUS at 22:04

## 2021-01-01 RX ADMIN — Medication 3 MILLILITER(S): at 17:52

## 2021-01-01 RX ADMIN — Medication 25 MILLIGRAM(S): at 05:28

## 2021-01-01 RX ADMIN — ATORVASTATIN CALCIUM 80 MILLIGRAM(S): 80 TABLET, FILM COATED ORAL at 21:49

## 2021-01-01 RX ADMIN — POLYETHYLENE GLYCOL 3350 17 GRAM(S): 17 POWDER, FOR SOLUTION ORAL at 16:58

## 2021-01-01 RX ADMIN — Medication 25 MICROGRAM(S): at 06:19

## 2021-01-01 RX ADMIN — Medication 25 MILLIGRAM(S): at 06:23

## 2021-01-01 RX ADMIN — PANTOPRAZOLE SODIUM 40 MILLIGRAM(S): 20 TABLET, DELAYED RELEASE ORAL at 12:41

## 2021-01-01 RX ADMIN — Medication 1000 UNIT(S): at 09:28

## 2021-01-01 RX ADMIN — DULOXETINE HYDROCHLORIDE 20 MILLIGRAM(S): 30 CAPSULE, DELAYED RELEASE ORAL at 13:21

## 2021-01-01 RX ADMIN — SENNA PLUS 10 MILLILITER(S): 8.6 TABLET ORAL at 22:00

## 2021-01-01 RX ADMIN — Medication 5 UNIT(S): at 17:15

## 2021-01-01 RX ADMIN — Medication 1 MILLIGRAM(S): at 12:34

## 2021-01-01 RX ADMIN — Medication 1000 UNIT(S): at 13:43

## 2021-01-01 RX ADMIN — Medication 2 MILLIGRAM(S): at 08:42

## 2021-01-01 RX ADMIN — Medication 500 MILLIGRAM(S): at 12:51

## 2021-01-01 RX ADMIN — Medication 1000 UNIT(S): at 11:58

## 2021-01-01 RX ADMIN — Medication 1 TABLET(S): at 13:54

## 2021-01-01 RX ADMIN — MIDODRINE HYDROCHLORIDE 10 MILLIGRAM(S): 2.5 TABLET ORAL at 07:55

## 2021-01-01 RX ADMIN — DIVALPROEX SODIUM 250 MILLIGRAM(S): 500 TABLET, DELAYED RELEASE ORAL at 05:12

## 2021-01-01 RX ADMIN — Medication 50 MILLIGRAM(S): at 11:38

## 2021-01-01 RX ADMIN — Medication 2 MILLIGRAM(S): at 13:06

## 2021-01-01 RX ADMIN — Medication 25 MILLIGRAM(S): at 04:39

## 2021-01-01 RX ADMIN — Medication 1 TABLET(S): at 12:33

## 2021-01-01 RX ADMIN — QUETIAPINE FUMARATE 25 MILLIGRAM(S): 200 TABLET, FILM COATED ORAL at 06:05

## 2021-01-01 RX ADMIN — Medication 2 MILLIGRAM(S): at 10:59

## 2021-01-01 RX ADMIN — MEMANTINE HYDROCHLORIDE 5 MILLIGRAM(S): 10 TABLET ORAL at 11:59

## 2021-01-01 RX ADMIN — Medication 1334 MILLIGRAM(S): at 11:46

## 2021-01-01 RX ADMIN — Medication 2 MILLIGRAM(S): at 05:58

## 2021-01-01 RX ADMIN — ERYTHROPOIETIN 14000 UNIT(S): 10000 INJECTION, SOLUTION INTRAVENOUS; SUBCUTANEOUS at 19:41

## 2021-01-01 RX ADMIN — Medication 1000 UNIT(S): at 13:08

## 2021-01-01 RX ADMIN — Medication 25 MILLIGRAM(S): at 17:39

## 2021-01-01 RX ADMIN — Medication 3 MILLILITER(S): at 05:56

## 2021-01-01 RX ADMIN — Medication 1 MILLIGRAM(S): at 11:58

## 2021-01-01 RX ADMIN — Medication 1: at 12:12

## 2021-01-01 RX ADMIN — Medication 50 MILLIGRAM(S): at 10:42

## 2021-01-01 RX ADMIN — Medication 1 MILLIGRAM(S): at 10:13

## 2021-01-01 RX ADMIN — Medication 1000 UNIT(S): at 13:21

## 2021-01-01 RX ADMIN — QUETIAPINE FUMARATE 12.5 MILLIGRAM(S): 200 TABLET, FILM COATED ORAL at 06:31

## 2021-01-01 RX ADMIN — Medication 50 MICROGRAM(S): at 05:18

## 2021-01-01 RX ADMIN — Medication 2 MILLIGRAM(S): at 06:36

## 2021-01-01 RX ADMIN — QUETIAPINE FUMARATE 12.5 MILLIGRAM(S): 200 TABLET, FILM COATED ORAL at 12:05

## 2021-01-01 RX ADMIN — QUETIAPINE FUMARATE 12.5 MILLIGRAM(S): 200 TABLET, FILM COATED ORAL at 13:41

## 2021-01-01 RX ADMIN — QUETIAPINE FUMARATE 12.5 MILLIGRAM(S): 200 TABLET, FILM COATED ORAL at 18:14

## 2021-01-01 RX ADMIN — Medication 2 MILLIGRAM(S): at 21:30

## 2021-01-01 RX ADMIN — Medication 50 MICROGRAM(S): at 05:59

## 2021-01-01 RX ADMIN — DIVALPROEX SODIUM 250 MILLIGRAM(S): 500 TABLET, DELAYED RELEASE ORAL at 22:23

## 2021-01-01 RX ADMIN — Medication 2 MILLIGRAM(S): at 14:36

## 2021-01-01 RX ADMIN — Medication 2 MILLIGRAM(S): at 13:22

## 2021-01-01 RX ADMIN — Medication 105 MILLIGRAM(S): at 22:04

## 2021-01-01 RX ADMIN — QUETIAPINE FUMARATE 12.5 MILLIGRAM(S): 200 TABLET, FILM COATED ORAL at 14:08

## 2021-01-01 RX ADMIN — Medication 25 MICROGRAM(S): at 06:07

## 2021-01-01 RX ADMIN — CARBIDOPA AND LEVODOPA 2 TABLET(S): 25; 100 TABLET ORAL at 13:12

## 2021-01-01 RX ADMIN — Medication 25 MILLIGRAM(S): at 22:23

## 2021-01-01 RX ADMIN — Medication 500 MILLIGRAM(S): at 12:27

## 2021-01-01 RX ADMIN — Medication 1 MILLIGRAM(S): at 12:37

## 2021-01-01 RX ADMIN — Medication 500 MILLIGRAM(S): at 13:23

## 2021-01-01 RX ADMIN — Medication 2 MILLIGRAM(S): at 05:53

## 2021-01-01 RX ADMIN — MIDODRINE HYDROCHLORIDE 10 MILLIGRAM(S): 2.5 TABLET ORAL at 05:25

## 2021-01-01 RX ADMIN — Medication 1: at 17:02

## 2021-01-01 RX ADMIN — DIVALPROEX SODIUM 250 MILLIGRAM(S): 500 TABLET, DELAYED RELEASE ORAL at 17:51

## 2021-01-01 RX ADMIN — PIPERACILLIN AND TAZOBACTAM 25 GRAM(S): 4; .5 INJECTION, POWDER, LYOPHILIZED, FOR SOLUTION INTRAVENOUS at 18:32

## 2021-01-01 RX ADMIN — POLYETHYLENE GLYCOL 3350 17 GRAM(S): 17 POWDER, FOR SOLUTION ORAL at 06:06

## 2021-01-01 RX ADMIN — HEPARIN SODIUM 1300 UNIT(S)/HR: 5000 INJECTION INTRAVENOUS; SUBCUTANEOUS at 03:48

## 2021-01-01 RX ADMIN — Medication 25 MICROGRAM(S): at 06:50

## 2021-01-01 RX ADMIN — Medication 105 MILLIGRAM(S): at 06:20

## 2021-01-01 RX ADMIN — Medication 1 MILLIGRAM(S): at 13:26

## 2021-01-01 RX ADMIN — Medication 650 MILLIGRAM(S): at 06:03

## 2021-01-01 RX ADMIN — CHLORHEXIDINE GLUCONATE 1 APPLICATION(S): 213 SOLUTION TOPICAL at 11:46

## 2021-01-01 RX ADMIN — POLYETHYLENE GLYCOL 3350 17 GRAM(S): 17 POWDER, FOR SOLUTION ORAL at 05:32

## 2021-01-01 RX ADMIN — Medication 650 MILLIGRAM(S): at 09:08

## 2021-01-01 RX ADMIN — INSULIN GLARGINE 8 UNIT(S): 100 INJECTION, SOLUTION SUBCUTANEOUS at 22:23

## 2021-01-01 RX ADMIN — Medication 650 MILLIGRAM(S): at 17:08

## 2021-01-01 RX ADMIN — Medication 25 MICROGRAM(S): at 06:18

## 2021-01-01 RX ADMIN — Medication 1 MILLIGRAM(S): at 12:27

## 2021-01-01 RX ADMIN — MIDODRINE HYDROCHLORIDE 20 MILLIGRAM(S): 2.5 TABLET ORAL at 21:52

## 2021-01-01 RX ADMIN — Medication 2 MILLIGRAM(S): at 13:13

## 2021-01-01 RX ADMIN — Medication 650 MILLIGRAM(S): at 05:31

## 2021-01-01 RX ADMIN — Medication 2 MILLIGRAM(S): at 05:56

## 2021-01-01 RX ADMIN — Medication 1: at 12:33

## 2021-01-01 RX ADMIN — Medication 25 GRAM(S): at 07:25

## 2021-01-01 RX ADMIN — INSULIN GLARGINE 22 UNIT(S): 100 INJECTION, SOLUTION SUBCUTANEOUS at 21:47

## 2021-01-01 RX ADMIN — MORPHINE SULFATE 2 MILLIGRAM(S): 50 CAPSULE, EXTENDED RELEASE ORAL at 12:45

## 2021-01-01 RX ADMIN — Medication 650 MILLIGRAM(S): at 14:09

## 2021-01-01 RX ADMIN — Medication 1: at 18:36

## 2021-01-01 RX ADMIN — CARBIDOPA AND LEVODOPA 2.5 TABLET(S): 25; 100 TABLET ORAL at 06:52

## 2021-01-01 RX ADMIN — Medication 1: at 08:35

## 2021-01-01 RX ADMIN — Medication 25 MILLIGRAM(S): at 17:52

## 2021-01-01 RX ADMIN — Medication 25 MILLIGRAM(S): at 17:08

## 2021-01-01 RX ADMIN — Medication 50 MILLIGRAM(S): at 21:37

## 2021-01-01 RX ADMIN — DIVALPROEX SODIUM 250 MILLIGRAM(S): 500 TABLET, DELAYED RELEASE ORAL at 17:32

## 2021-01-01 RX ADMIN — Medication 650 MILLIGRAM(S): at 15:54

## 2021-01-01 RX ADMIN — MEMANTINE HYDROCHLORIDE 5 MILLIGRAM(S): 10 TABLET ORAL at 22:38

## 2021-01-01 RX ADMIN — Medication 2: at 12:20

## 2021-01-01 RX ADMIN — Medication 1 TABLET(S): at 10:45

## 2021-01-01 RX ADMIN — CARBIDOPA AND LEVODOPA 2.5 TABLET(S): 25; 100 TABLET ORAL at 15:28

## 2021-01-01 RX ADMIN — Medication 50 MILLIGRAM(S): at 06:36

## 2021-01-01 RX ADMIN — Medication 1 TABLET(S): at 11:13

## 2021-01-01 RX ADMIN — Medication 1 MILLIGRAM(S): at 13:45

## 2021-01-01 RX ADMIN — Medication 650 MILLIGRAM(S): at 12:50

## 2021-01-01 RX ADMIN — DESMOPRESSIN ACETATE 230 MICROGRAM(S): 0.1 TABLET ORAL at 23:15

## 2021-01-01 RX ADMIN — SEVELAMER CARBONATE 1600 MILLIGRAM(S): 2400 POWDER, FOR SUSPENSION ORAL at 05:55

## 2021-01-01 RX ADMIN — QUETIAPINE FUMARATE 25 MILLIGRAM(S): 200 TABLET, FILM COATED ORAL at 14:39

## 2021-01-01 RX ADMIN — Medication 2 MILLIGRAM(S): at 06:52

## 2021-01-01 RX ADMIN — Medication 25 MICROGRAM(S): at 05:56

## 2021-01-01 RX ADMIN — Medication 200 MILLIGRAM(S): at 22:30

## 2021-01-01 RX ADMIN — DIVALPROEX SODIUM 250 MILLIGRAM(S): 500 TABLET, DELAYED RELEASE ORAL at 05:37

## 2021-01-01 RX ADMIN — CARBIDOPA AND LEVODOPA 2.5 TABLET(S): 25; 100 TABLET ORAL at 05:38

## 2021-01-01 RX ADMIN — DIVALPROEX SODIUM 250 MILLIGRAM(S): 500 TABLET, DELAYED RELEASE ORAL at 21:18

## 2021-01-01 RX ADMIN — Medication 3 MILLILITER(S): at 00:12

## 2021-01-01 RX ADMIN — Medication 3 MILLILITER(S): at 17:38

## 2021-01-01 RX ADMIN — ATORVASTATIN CALCIUM 80 MILLIGRAM(S): 80 TABLET, FILM COATED ORAL at 22:54

## 2021-01-01 RX ADMIN — INSULIN GLARGINE 6 UNIT(S): 100 INJECTION, SOLUTION SUBCUTANEOUS at 22:41

## 2021-01-01 RX ADMIN — ATORVASTATIN CALCIUM 80 MILLIGRAM(S): 80 TABLET, FILM COATED ORAL at 22:04

## 2021-01-01 RX ADMIN — CARBIDOPA AND LEVODOPA 2.5 TABLET(S): 25; 100 TABLET ORAL at 06:28

## 2021-01-01 RX ADMIN — ONDANSETRON 4 MILLIGRAM(S): 8 TABLET, FILM COATED ORAL at 08:19

## 2021-01-01 RX ADMIN — Medication 50 MICROGRAM(S): at 06:04

## 2021-01-01 RX ADMIN — ATORVASTATIN CALCIUM 80 MILLIGRAM(S): 80 TABLET, FILM COATED ORAL at 21:57

## 2021-01-01 RX ADMIN — OXYCODONE HYDROCHLORIDE 5 MILLIGRAM(S): 5 TABLET ORAL at 23:31

## 2021-01-01 RX ADMIN — Medication 25 MILLIGRAM(S): at 17:04

## 2021-01-01 RX ADMIN — CARBIDOPA AND LEVODOPA 2.5 TABLET(S): 25; 100 TABLET ORAL at 05:33

## 2021-01-01 RX ADMIN — MIRTAZAPINE 7.5 MILLIGRAM(S): 45 TABLET, ORALLY DISINTEGRATING ORAL at 14:01

## 2021-01-01 RX ADMIN — Medication 1: at 12:14

## 2021-01-01 RX ADMIN — CARBIDOPA AND LEVODOPA 2.5 TABLET(S): 25; 100 TABLET ORAL at 06:00

## 2021-01-01 RX ADMIN — Medication 25 MILLIGRAM(S): at 06:18

## 2021-01-01 RX ADMIN — Medication 1 TABLET(S): at 13:05

## 2021-01-01 RX ADMIN — Medication 25 MILLIGRAM(S): at 05:32

## 2021-01-01 RX ADMIN — CHLORHEXIDINE GLUCONATE 1 APPLICATION(S): 213 SOLUTION TOPICAL at 10:30

## 2021-01-01 RX ADMIN — CARBIDOPA AND LEVODOPA 2 TABLET(S): 25; 100 TABLET ORAL at 22:41

## 2021-01-01 RX ADMIN — Medication 2 MILLIGRAM(S): at 21:28

## 2021-01-01 RX ADMIN — PANTOPRAZOLE SODIUM 40 MILLIGRAM(S): 20 TABLET, DELAYED RELEASE ORAL at 12:27

## 2021-01-01 RX ADMIN — POLYETHYLENE GLYCOL 3350 17 GRAM(S): 17 POWDER, FOR SOLUTION ORAL at 05:08

## 2021-01-01 RX ADMIN — MIDODRINE HYDROCHLORIDE 10 MILLIGRAM(S): 2.5 TABLET ORAL at 10:42

## 2021-01-01 RX ADMIN — Medication 2 MILLIGRAM(S): at 06:28

## 2021-01-01 RX ADMIN — CHLORHEXIDINE GLUCONATE 1 APPLICATION(S): 213 SOLUTION TOPICAL at 08:18

## 2021-01-01 RX ADMIN — Medication 25 MICROGRAM(S): at 05:18

## 2021-01-01 RX ADMIN — Medication 667 MILLIGRAM(S): at 11:59

## 2021-01-01 RX ADMIN — CHLORHEXIDINE GLUCONATE 1 APPLICATION(S): 213 SOLUTION TOPICAL at 06:03

## 2021-01-01 RX ADMIN — DIVALPROEX SODIUM 250 MILLIGRAM(S): 500 TABLET, DELAYED RELEASE ORAL at 21:05

## 2021-01-01 RX ADMIN — SENNA PLUS 10 MILLILITER(S): 8.6 TABLET ORAL at 22:38

## 2021-01-01 RX ADMIN — CARBIDOPA AND LEVODOPA 2 TABLET(S): 25; 100 TABLET ORAL at 22:16

## 2021-01-01 RX ADMIN — Medication 2 MILLIGRAM(S): at 14:01

## 2021-01-01 RX ADMIN — Medication 1 TABLET(S): at 19:00

## 2021-01-01 RX ADMIN — Medication 50 MILLIGRAM(S): at 21:04

## 2021-01-01 RX ADMIN — POLYETHYLENE GLYCOL 3350 17 GRAM(S): 17 POWDER, FOR SOLUTION ORAL at 05:25

## 2021-01-01 RX ADMIN — POLYETHYLENE GLYCOL 3350 17 GRAM(S): 17 POWDER, FOR SOLUTION ORAL at 22:40

## 2021-01-01 RX ADMIN — Medication 650 MILLIGRAM(S): at 06:07

## 2021-01-01 RX ADMIN — Medication 1000 UNIT(S): at 15:45

## 2021-01-01 RX ADMIN — Medication 50 MICROGRAM(S): at 05:02

## 2021-01-01 RX ADMIN — DIVALPROEX SODIUM 125 MILLIGRAM(S): 500 TABLET, DELAYED RELEASE ORAL at 22:03

## 2021-01-01 RX ADMIN — Medication 1: at 16:42

## 2021-01-01 RX ADMIN — Medication 200 MILLIGRAM(S): at 11:17

## 2021-01-01 RX ADMIN — SENNA PLUS 10 MILLILITER(S): 8.6 TABLET ORAL at 21:06

## 2021-01-01 RX ADMIN — Medication 25 MICROGRAM(S): at 06:09

## 2021-01-01 RX ADMIN — POLYETHYLENE GLYCOL 3350 17 GRAM(S): 17 POWDER, FOR SOLUTION ORAL at 06:20

## 2021-01-01 RX ADMIN — Medication 3 UNIT(S): at 12:36

## 2021-01-01 RX ADMIN — Medication 2 MILLIGRAM(S): at 13:30

## 2021-01-01 RX ADMIN — Medication 1 MILLIGRAM(S): at 12:47

## 2021-01-01 RX ADMIN — DIVALPROEX SODIUM 250 MILLIGRAM(S): 500 TABLET, DELAYED RELEASE ORAL at 05:56

## 2021-01-01 RX ADMIN — SENNA PLUS 10 MILLILITER(S): 8.6 TABLET ORAL at 21:44

## 2021-01-01 RX ADMIN — POLYETHYLENE GLYCOL 3350 17 GRAM(S): 17 POWDER, FOR SOLUTION ORAL at 06:28

## 2021-01-01 RX ADMIN — POLYETHYLENE GLYCOL 3350 17 GRAM(S): 17 POWDER, FOR SOLUTION ORAL at 06:39

## 2021-01-01 RX ADMIN — DIVALPROEX SODIUM 250 MILLIGRAM(S): 500 TABLET, DELAYED RELEASE ORAL at 06:02

## 2021-01-01 RX ADMIN — CARBIDOPA AND LEVODOPA 2.5 TABLET(S): 25; 100 TABLET ORAL at 13:09

## 2021-01-01 RX ADMIN — CARBIDOPA AND LEVODOPA 2.5 TABLET(S): 25; 100 TABLET ORAL at 14:01

## 2021-01-01 RX ADMIN — Medication 50 MILLIGRAM(S): at 05:41

## 2021-01-01 RX ADMIN — CINACALCET 60 MILLIGRAM(S): 30 TABLET, FILM COATED ORAL at 13:43

## 2021-01-01 RX ADMIN — SENNA PLUS 10 MILLILITER(S): 8.6 TABLET ORAL at 22:13

## 2021-01-01 RX ADMIN — HEPARIN SODIUM 3 UNIT(S)/HR: 5000 INJECTION INTRAVENOUS; SUBCUTANEOUS at 05:42

## 2021-01-01 RX ADMIN — Medication 1: at 12:45

## 2021-01-01 RX ADMIN — CARBIDOPA AND LEVODOPA 2 TABLET(S): 25; 100 TABLET ORAL at 23:02

## 2021-01-01 RX ADMIN — SENNA PLUS 10 MILLILITER(S): 8.6 TABLET ORAL at 22:29

## 2021-01-01 RX ADMIN — Medication 50 MICROGRAM(S): at 06:37

## 2021-01-01 RX ADMIN — CARBIDOPA AND LEVODOPA 2.5 TABLET(S): 25; 100 TABLET ORAL at 13:14

## 2021-01-01 RX ADMIN — Medication 2 MILLIGRAM(S): at 13:33

## 2021-01-01 RX ADMIN — CARBIDOPA AND LEVODOPA 2.5 TABLET(S): 25; 100 TABLET ORAL at 05:25

## 2021-01-01 RX ADMIN — Medication 2 MILLIGRAM(S): at 05:42

## 2021-01-01 RX ADMIN — DIVALPROEX SODIUM 250 MILLIGRAM(S): 500 TABLET, DELAYED RELEASE ORAL at 13:08

## 2021-01-01 RX ADMIN — Medication 1334 MILLIGRAM(S): at 07:58

## 2021-01-01 RX ADMIN — Medication 81 MILLIGRAM(S): at 11:46

## 2021-01-01 RX ADMIN — Medication 2 MILLIGRAM(S): at 22:46

## 2021-01-01 RX ADMIN — QUETIAPINE FUMARATE 12.5 MILLIGRAM(S): 200 TABLET, FILM COATED ORAL at 17:16

## 2021-01-01 RX ADMIN — ATORVASTATIN CALCIUM 80 MILLIGRAM(S): 80 TABLET, FILM COATED ORAL at 21:36

## 2021-01-01 RX ADMIN — SEVELAMER CARBONATE 1600 MILLIGRAM(S): 2400 POWDER, FOR SUSPENSION ORAL at 17:41

## 2021-01-01 RX ADMIN — MUPIROCIN 1 APPLICATION(S): 20 OINTMENT TOPICAL at 17:26

## 2021-01-01 RX ADMIN — POLYETHYLENE GLYCOL 3350 17 GRAM(S): 17 POWDER, FOR SOLUTION ORAL at 05:38

## 2021-01-01 RX ADMIN — Medication 25 MILLIGRAM(S): at 05:38

## 2021-01-01 RX ADMIN — ERYTHROPOIETIN 10000 UNIT(S): 10000 INJECTION, SOLUTION INTRAVENOUS; SUBCUTANEOUS at 23:01

## 2021-01-01 RX ADMIN — Medication 650 MILLIGRAM(S): at 13:55

## 2021-01-01 RX ADMIN — POLYETHYLENE GLYCOL 3350 17 GRAM(S): 17 POWDER, FOR SOLUTION ORAL at 17:51

## 2021-01-01 RX ADMIN — Medication 975 MILLIGRAM(S): at 09:50

## 2021-01-01 RX ADMIN — INSULIN GLARGINE 8 UNIT(S): 100 INJECTION, SOLUTION SUBCUTANEOUS at 22:14

## 2021-01-01 RX ADMIN — QUETIAPINE FUMARATE 12.5 MILLIGRAM(S): 200 TABLET, FILM COATED ORAL at 22:04

## 2021-01-01 RX ADMIN — Medication 500 MILLIGRAM(S): at 11:35

## 2021-01-01 RX ADMIN — Medication 2 MILLIGRAM(S): at 17:16

## 2021-01-01 RX ADMIN — ATORVASTATIN CALCIUM 80 MILLIGRAM(S): 80 TABLET, FILM COATED ORAL at 22:28

## 2021-01-01 RX ADMIN — Medication 3 MILLILITER(S): at 17:26

## 2021-01-01 RX ADMIN — INSULIN GLARGINE 6 UNIT(S): 100 INJECTION, SOLUTION SUBCUTANEOUS at 22:08

## 2021-01-01 RX ADMIN — Medication 3 MILLILITER(S): at 11:43

## 2021-01-01 RX ADMIN — SENNA PLUS 10 MILLILITER(S): 8.6 TABLET ORAL at 22:25

## 2021-01-01 RX ADMIN — Medication 650 MILLIGRAM(S): at 06:01

## 2021-01-01 RX ADMIN — POLYETHYLENE GLYCOL 3350 17 GRAM(S): 17 POWDER, FOR SOLUTION ORAL at 17:50

## 2021-01-01 RX ADMIN — Medication 50 MILLIGRAM(S): at 17:36

## 2021-01-01 RX ADMIN — QUETIAPINE FUMARATE 25 MILLIGRAM(S): 200 TABLET, FILM COATED ORAL at 05:46

## 2021-01-01 RX ADMIN — Medication 1000 UNIT(S): at 12:47

## 2021-01-01 RX ADMIN — Medication 650 MILLIGRAM(S): at 13:38

## 2021-01-01 RX ADMIN — SEVELAMER CARBONATE 1600 MILLIGRAM(S): 2400 POWDER, FOR SUSPENSION ORAL at 10:38

## 2021-01-01 RX ADMIN — Medication 650 MILLIGRAM(S): at 22:08

## 2021-01-01 RX ADMIN — Medication 2 MILLIGRAM(S): at 14:10

## 2021-01-01 RX ADMIN — CARBIDOPA AND LEVODOPA 2 TABLET(S): 25; 100 TABLET ORAL at 13:33

## 2021-01-01 RX ADMIN — DIVALPROEX SODIUM 250 MILLIGRAM(S): 500 TABLET, DELAYED RELEASE ORAL at 06:35

## 2021-01-01 RX ADMIN — SEVELAMER CARBONATE 1600 MILLIGRAM(S): 2400 POWDER, FOR SUSPENSION ORAL at 18:19

## 2021-01-01 RX ADMIN — Medication 1334 MILLIGRAM(S): at 17:26

## 2021-01-01 RX ADMIN — CHLORHEXIDINE GLUCONATE 1 APPLICATION(S): 213 SOLUTION TOPICAL at 07:47

## 2021-01-01 RX ADMIN — POLYETHYLENE GLYCOL 3350 17 GRAM(S): 17 POWDER, FOR SOLUTION ORAL at 06:45

## 2021-01-01 RX ADMIN — Medication 50 MICROGRAM(S): at 05:41

## 2021-01-01 RX ADMIN — Medication 1: at 00:11

## 2021-01-01 RX ADMIN — MIDODRINE HYDROCHLORIDE 10 MILLIGRAM(S): 2.5 TABLET ORAL at 08:01

## 2021-01-01 RX ADMIN — ATORVASTATIN CALCIUM 80 MILLIGRAM(S): 80 TABLET, FILM COATED ORAL at 23:14

## 2021-01-01 RX ADMIN — Medication 3 MILLILITER(S): at 00:58

## 2021-01-01 RX ADMIN — HEPARIN SODIUM 1600 UNIT(S)/HR: 5000 INJECTION INTRAVENOUS; SUBCUTANEOUS at 20:03

## 2021-01-01 RX ADMIN — Medication 2 MILLIGRAM(S): at 22:30

## 2021-01-01 RX ADMIN — Medication 25 MICROGRAM(S): at 06:51

## 2021-01-01 RX ADMIN — CARBIDOPA AND LEVODOPA 2.5 TABLET(S): 25; 100 TABLET ORAL at 06:32

## 2021-01-01 RX ADMIN — INSULIN GLARGINE 6 UNIT(S): 100 INJECTION, SOLUTION SUBCUTANEOUS at 21:54

## 2021-01-01 RX ADMIN — Medication 25 MILLIGRAM(S): at 17:36

## 2021-01-01 RX ADMIN — OXYCODONE HYDROCHLORIDE 2.5 MILLIGRAM(S): 5 TABLET ORAL at 05:38

## 2021-01-01 RX ADMIN — ATORVASTATIN CALCIUM 80 MILLIGRAM(S): 80 TABLET, FILM COATED ORAL at 22:49

## 2021-01-01 RX ADMIN — INSULIN GLARGINE 8 UNIT(S): 100 INJECTION, SOLUTION SUBCUTANEOUS at 21:31

## 2021-01-01 RX ADMIN — MIRTAZAPINE 7.5 MILLIGRAM(S): 45 TABLET, ORALLY DISINTEGRATING ORAL at 10:12

## 2021-01-01 RX ADMIN — Medication 1 MILLIGRAM(S): at 13:54

## 2021-01-01 RX ADMIN — DIVALPROEX SODIUM 250 MILLIGRAM(S): 500 TABLET, DELAYED RELEASE ORAL at 21:13

## 2021-01-01 RX ADMIN — Medication 1 MILLIGRAM(S): at 13:50

## 2021-01-01 RX ADMIN — Medication 1: at 11:55

## 2021-01-01 RX ADMIN — Medication 2 MILLIGRAM(S): at 12:38

## 2021-01-01 RX ADMIN — Medication 1 TABLET(S): at 14:04

## 2021-01-01 RX ADMIN — Medication 667 MILLIGRAM(S): at 17:38

## 2021-01-01 RX ADMIN — DIVALPROEX SODIUM 125 MILLIGRAM(S): 500 TABLET, DELAYED RELEASE ORAL at 03:43

## 2021-01-01 RX ADMIN — MEMANTINE HYDROCHLORIDE 5 MILLIGRAM(S): 10 TABLET ORAL at 22:12

## 2021-01-01 RX ADMIN — CARBIDOPA AND LEVODOPA 2.5 TABLET(S): 25; 100 TABLET ORAL at 13:56

## 2021-01-01 RX ADMIN — Medication 1: at 17:23

## 2021-01-01 RX ADMIN — QUETIAPINE FUMARATE 25 MILLIGRAM(S): 200 TABLET, FILM COATED ORAL at 15:09

## 2021-01-01 RX ADMIN — DIVALPROEX SODIUM 250 MILLIGRAM(S): 500 TABLET, DELAYED RELEASE ORAL at 05:24

## 2021-01-01 RX ADMIN — CARBIDOPA AND LEVODOPA 2.5 TABLET(S): 25; 100 TABLET ORAL at 05:21

## 2021-01-01 RX ADMIN — MEMANTINE HYDROCHLORIDE 5 MILLIGRAM(S): 10 TABLET ORAL at 22:51

## 2021-01-01 RX ADMIN — Medication 500 MILLIGRAM(S): at 11:17

## 2021-01-01 RX ADMIN — Medication 1 MILLIGRAM(S): at 11:42

## 2021-01-01 RX ADMIN — QUETIAPINE FUMARATE 25 MILLIGRAM(S): 200 TABLET, FILM COATED ORAL at 22:40

## 2021-01-01 RX ADMIN — MUPIROCIN 1 APPLICATION(S): 20 OINTMENT TOPICAL at 17:37

## 2021-01-01 RX ADMIN — Medication 25 MILLIGRAM(S): at 06:19

## 2021-01-01 RX ADMIN — Medication 3 UNIT(S): at 08:14

## 2021-01-01 RX ADMIN — CINACALCET 60 MILLIGRAM(S): 30 TABLET, FILM COATED ORAL at 10:37

## 2021-01-01 RX ADMIN — Medication 25 MILLIGRAM(S): at 17:47

## 2021-01-01 RX ADMIN — CHLORHEXIDINE GLUCONATE 1 APPLICATION(S): 213 SOLUTION TOPICAL at 06:51

## 2021-01-01 RX ADMIN — Medication 667 MILLIGRAM(S): at 08:11

## 2021-01-01 RX ADMIN — Medication 1: at 17:44

## 2021-01-01 RX ADMIN — Medication 2 MILLIGRAM(S): at 13:12

## 2021-01-01 RX ADMIN — Medication 650 MILLIGRAM(S): at 18:00

## 2021-01-01 RX ADMIN — Medication 2 MILLIGRAM(S): at 13:09

## 2021-01-01 RX ADMIN — QUETIAPINE FUMARATE 12.5 MILLIGRAM(S): 200 TABLET, FILM COATED ORAL at 17:50

## 2021-01-01 RX ADMIN — Medication 2 MILLIGRAM(S): at 05:46

## 2021-01-01 RX ADMIN — Medication 650 MILLIGRAM(S): at 21:27

## 2021-01-01 RX ADMIN — ATORVASTATIN CALCIUM 80 MILLIGRAM(S): 80 TABLET, FILM COATED ORAL at 21:53

## 2021-01-01 RX ADMIN — Medication 2 MILLIGRAM(S): at 14:00

## 2021-01-01 RX ADMIN — DIVALPROEX SODIUM 250 MILLIGRAM(S): 500 TABLET, DELAYED RELEASE ORAL at 13:54

## 2021-01-01 RX ADMIN — CHLORHEXIDINE GLUCONATE 1 APPLICATION(S): 213 SOLUTION TOPICAL at 07:19

## 2021-01-01 RX ADMIN — Medication 1: at 08:11

## 2021-01-01 RX ADMIN — INSULIN GLARGINE 8 UNIT(S): 100 INJECTION, SOLUTION SUBCUTANEOUS at 21:47

## 2021-01-01 RX ADMIN — Medication 1 TABLET(S): at 13:42

## 2021-01-01 RX ADMIN — Medication 2 MILLIGRAM(S): at 14:27

## 2021-01-01 RX ADMIN — Medication 2 MILLIGRAM(S): at 21:52

## 2021-01-01 RX ADMIN — Medication 1 MILLIGRAM(S): at 15:07

## 2021-01-01 RX ADMIN — CARBIDOPA AND LEVODOPA 2 TABLET(S): 25; 100 TABLET ORAL at 22:02

## 2021-01-01 RX ADMIN — Medication 500 MILLIGRAM(S): at 12:14

## 2021-01-01 RX ADMIN — Medication 2: at 07:58

## 2021-01-01 RX ADMIN — Medication 650 MILLIGRAM(S): at 03:30

## 2021-01-01 RX ADMIN — Medication 1000 UNIT(S): at 13:01

## 2021-01-01 RX ADMIN — HEPARIN SODIUM 3 UNIT(S)/HR: 5000 INJECTION INTRAVENOUS; SUBCUTANEOUS at 13:00

## 2021-01-01 RX ADMIN — CARBIDOPA AND LEVODOPA 2.5 TABLET(S): 25; 100 TABLET ORAL at 13:00

## 2021-01-01 RX ADMIN — Medication 2 MILLIGRAM(S): at 22:52

## 2021-01-01 RX ADMIN — Medication 25 MILLIGRAM(S): at 05:57

## 2021-01-01 RX ADMIN — Medication 50 MICROGRAM(S): at 06:01

## 2021-01-01 RX ADMIN — Medication 1 MILLIGRAM(S): at 11:18

## 2021-01-01 RX ADMIN — Medication 2 MILLIGRAM(S): at 22:51

## 2021-01-01 RX ADMIN — OXYCODONE HYDROCHLORIDE 5 MILLIGRAM(S): 5 TABLET ORAL at 04:49

## 2021-01-01 RX ADMIN — Medication 1334 MILLIGRAM(S): at 17:52

## 2021-01-01 RX ADMIN — QUETIAPINE FUMARATE 12.5 MILLIGRAM(S): 200 TABLET, FILM COATED ORAL at 05:18

## 2021-01-01 RX ADMIN — MEMANTINE HYDROCHLORIDE 5 MILLIGRAM(S): 10 TABLET ORAL at 22:09

## 2021-01-01 RX ADMIN — Medication 1000 UNIT(S): at 13:11

## 2021-01-01 RX ADMIN — QUETIAPINE FUMARATE 12.5 MILLIGRAM(S): 200 TABLET, FILM COATED ORAL at 12:25

## 2021-01-01 RX ADMIN — INSULIN GLARGINE 8 UNIT(S): 100 INJECTION, SOLUTION SUBCUTANEOUS at 23:35

## 2021-01-01 RX ADMIN — Medication 25 MICROGRAM(S): at 05:36

## 2021-01-01 RX ADMIN — QUETIAPINE FUMARATE 25 MILLIGRAM(S): 200 TABLET, FILM COATED ORAL at 06:09

## 2021-01-01 RX ADMIN — Medication 2 MILLIGRAM(S): at 21:44

## 2021-01-01 RX ADMIN — CARBIDOPA AND LEVODOPA 2.5 TABLET(S): 25; 100 TABLET ORAL at 06:21

## 2021-01-01 RX ADMIN — QUETIAPINE FUMARATE 25 MILLIGRAM(S): 200 TABLET, FILM COATED ORAL at 22:04

## 2021-01-01 RX ADMIN — Medication 1 MILLIGRAM(S): at 13:24

## 2021-01-01 RX ADMIN — OXYCODONE HYDROCHLORIDE 5 MILLIGRAM(S): 5 TABLET ORAL at 10:47

## 2021-01-01 RX ADMIN — CHLORHEXIDINE GLUCONATE 1 APPLICATION(S): 213 SOLUTION TOPICAL at 05:38

## 2021-01-01 RX ADMIN — Medication 3 MILLILITER(S): at 07:13

## 2021-01-01 RX ADMIN — Medication 1: at 06:16

## 2021-01-01 RX ADMIN — SEVELAMER CARBONATE 1600 MILLIGRAM(S): 2400 POWDER, FOR SUSPENSION ORAL at 22:05

## 2021-01-01 RX ADMIN — Medication 2: at 05:36

## 2021-01-01 RX ADMIN — Medication 2: at 11:58

## 2021-01-01 RX ADMIN — Medication 2 MILLIGRAM(S): at 12:26

## 2021-01-01 RX ADMIN — HYDROMORPHONE HYDROCHLORIDE 0.5 MILLIGRAM(S): 2 INJECTION INTRAMUSCULAR; INTRAVENOUS; SUBCUTANEOUS at 20:20

## 2021-01-01 RX ADMIN — HALOPERIDOL DECANOATE 0.5 MILLIGRAM(S): 100 INJECTION INTRAMUSCULAR at 16:21

## 2021-01-01 RX ADMIN — MIDODRINE HYDROCHLORIDE 20 MILLIGRAM(S): 2.5 TABLET ORAL at 05:39

## 2021-01-01 RX ADMIN — Medication 2 MILLIGRAM(S): at 21:22

## 2021-01-01 RX ADMIN — Medication 2 MILLIGRAM(S): at 13:53

## 2021-01-01 RX ADMIN — Medication 2 MILLIGRAM(S): at 22:07

## 2021-01-01 RX ADMIN — Medication 1: at 17:19

## 2021-01-01 RX ADMIN — Medication 1: at 12:23

## 2021-01-01 RX ADMIN — CARBIDOPA AND LEVODOPA 2 TABLET(S): 25; 100 TABLET ORAL at 22:09

## 2021-01-01 RX ADMIN — Medication 50 MILLIGRAM(S): at 17:27

## 2021-01-01 RX ADMIN — Medication 2 MILLIGRAM(S): at 06:18

## 2021-01-01 RX ADMIN — Medication 1000 UNIT(S): at 11:55

## 2021-01-01 RX ADMIN — Medication 3 MILLILITER(S): at 12:00

## 2021-01-01 RX ADMIN — SENNA PLUS 10 MILLILITER(S): 8.6 TABLET ORAL at 23:29

## 2021-01-01 RX ADMIN — DIVALPROEX SODIUM 250 MILLIGRAM(S): 500 TABLET, DELAYED RELEASE ORAL at 21:34

## 2021-01-01 RX ADMIN — MIDODRINE HYDROCHLORIDE 10 MILLIGRAM(S): 2.5 TABLET ORAL at 12:38

## 2021-01-01 RX ADMIN — MIRTAZAPINE 7.5 MILLIGRAM(S): 45 TABLET, ORALLY DISINTEGRATING ORAL at 10:45

## 2021-01-01 RX ADMIN — INSULIN GLARGINE 6 UNIT(S): 100 INJECTION, SOLUTION SUBCUTANEOUS at 22:07

## 2021-01-01 RX ADMIN — Medication 2 MILLIGRAM(S): at 05:03

## 2021-01-01 RX ADMIN — Medication 2 MILLIGRAM(S): at 13:05

## 2021-01-01 RX ADMIN — Medication 650 MILLIGRAM(S): at 19:18

## 2021-01-01 RX ADMIN — CARBIDOPA AND LEVODOPA 2.5 TABLET(S): 25; 100 TABLET ORAL at 13:53

## 2021-01-01 RX ADMIN — Medication 2 MILLIGRAM(S): at 06:55

## 2021-01-01 RX ADMIN — SENNA PLUS 10 MILLILITER(S): 8.6 TABLET ORAL at 21:49

## 2021-01-01 RX ADMIN — Medication 1 TABLET(S): at 11:52

## 2021-01-01 RX ADMIN — Medication 2 MILLIGRAM(S): at 22:09

## 2021-01-01 RX ADMIN — CARBIDOPA AND LEVODOPA 2 TABLET(S): 25; 100 TABLET ORAL at 21:38

## 2021-01-01 RX ADMIN — Medication 25 MICROGRAM(S): at 05:14

## 2021-01-01 RX ADMIN — QUETIAPINE FUMARATE 12.5 MILLIGRAM(S): 200 TABLET, FILM COATED ORAL at 23:30

## 2021-01-01 RX ADMIN — INSULIN GLARGINE 8 UNIT(S): 100 INJECTION, SOLUTION SUBCUTANEOUS at 21:40

## 2021-01-01 RX ADMIN — Medication 500 MILLIGRAM(S): at 13:55

## 2021-01-01 RX ADMIN — QUETIAPINE FUMARATE 12.5 MILLIGRAM(S): 200 TABLET, FILM COATED ORAL at 13:45

## 2021-01-01 RX ADMIN — Medication 1 TABLET(S): at 12:34

## 2021-01-01 RX ADMIN — ATORVASTATIN CALCIUM 80 MILLIGRAM(S): 80 TABLET, FILM COATED ORAL at 22:24

## 2021-01-01 RX ADMIN — Medication 2 MILLIGRAM(S): at 06:34

## 2021-01-01 RX ADMIN — Medication 1000 UNIT(S): at 12:36

## 2021-01-01 RX ADMIN — CINACALCET 60 MILLIGRAM(S): 30 TABLET, FILM COATED ORAL at 13:23

## 2021-01-01 RX ADMIN — Medication 1000 UNIT(S): at 13:24

## 2021-01-01 RX ADMIN — MEMANTINE HYDROCHLORIDE 5 MILLIGRAM(S): 10 TABLET ORAL at 23:49

## 2021-01-01 RX ADMIN — DEXMEDETOMIDINE HYDROCHLORIDE IN 0.9% SODIUM CHLORIDE 18.1 MICROGRAM(S)/KG/HR: 4 INJECTION INTRAVENOUS at 09:04

## 2021-01-01 RX ADMIN — Medication 1: at 00:28

## 2021-01-01 RX ADMIN — CARBIDOPA AND LEVODOPA 2.5 TABLET(S): 25; 100 TABLET ORAL at 14:27

## 2021-01-01 RX ADMIN — MIDODRINE HYDROCHLORIDE 10 MILLIGRAM(S): 2.5 TABLET ORAL at 12:17

## 2021-01-01 RX ADMIN — Medication 1 TABLET(S): at 13:21

## 2021-01-01 RX ADMIN — SEVELAMER CARBONATE 1600 MILLIGRAM(S): 2400 POWDER, FOR SUSPENSION ORAL at 12:05

## 2021-01-01 RX ADMIN — INSULIN GLARGINE 8 UNIT(S): 100 INJECTION, SOLUTION SUBCUTANEOUS at 22:12

## 2021-01-01 RX ADMIN — Medication 2 MILLIGRAM(S): at 15:16

## 2021-01-01 RX ADMIN — POLYETHYLENE GLYCOL 3350 17 GRAM(S): 17 POWDER, FOR SOLUTION ORAL at 17:44

## 2021-01-01 RX ADMIN — POLYETHYLENE GLYCOL 3350 17 GRAM(S): 17 POWDER, FOR SOLUTION ORAL at 05:14

## 2021-01-01 RX ADMIN — Medication 2 MILLIGRAM(S): at 05:09

## 2021-01-01 RX ADMIN — Medication 2 MILLIGRAM(S): at 13:02

## 2021-01-01 RX ADMIN — QUETIAPINE FUMARATE 12.5 MILLIGRAM(S): 200 TABLET, FILM COATED ORAL at 22:44

## 2021-01-01 RX ADMIN — MIRTAZAPINE 7.5 MILLIGRAM(S): 45 TABLET, ORALLY DISINTEGRATING ORAL at 09:29

## 2021-01-01 RX ADMIN — POLYETHYLENE GLYCOL 3350 17 GRAM(S): 17 POWDER, FOR SOLUTION ORAL at 19:05

## 2021-01-01 RX ADMIN — DIVALPROEX SODIUM 250 MILLIGRAM(S): 500 TABLET, DELAYED RELEASE ORAL at 17:39

## 2021-01-01 RX ADMIN — Medication 2: at 16:54

## 2021-01-01 RX ADMIN — SEVELAMER CARBONATE 1600 MILLIGRAM(S): 2400 POWDER, FOR SUSPENSION ORAL at 05:39

## 2021-01-01 RX ADMIN — Medication 0: at 18:00

## 2021-01-01 RX ADMIN — CINACALCET 60 MILLIGRAM(S): 30 TABLET, FILM COATED ORAL at 13:57

## 2021-01-01 RX ADMIN — Medication 1 MILLIGRAM(S): at 13:56

## 2021-01-01 RX ADMIN — ATORVASTATIN CALCIUM 80 MILLIGRAM(S): 80 TABLET, FILM COATED ORAL at 22:18

## 2021-01-01 RX ADMIN — QUETIAPINE FUMARATE 12.5 MILLIGRAM(S): 200 TABLET, FILM COATED ORAL at 12:50

## 2021-01-01 RX ADMIN — ATORVASTATIN CALCIUM 80 MILLIGRAM(S): 80 TABLET, FILM COATED ORAL at 21:47

## 2021-01-01 RX ADMIN — Medication 1 MILLIGRAM(S): at 13:03

## 2021-01-01 RX ADMIN — HEPARIN SODIUM 9000 UNIT(S): 5000 INJECTION INTRAVENOUS; SUBCUTANEOUS at 11:03

## 2021-01-01 RX ADMIN — Medication 1 MILLIGRAM(S): at 12:33

## 2021-01-01 RX ADMIN — Medication 200 MILLIGRAM(S): at 21:44

## 2021-01-01 RX ADMIN — Medication 12.5 GRAM(S): at 07:59

## 2021-01-01 RX ADMIN — CARBIDOPA AND LEVODOPA 2.5 TABLET(S): 25; 100 TABLET ORAL at 06:54

## 2021-01-01 RX ADMIN — QUETIAPINE FUMARATE 12.5 MILLIGRAM(S): 200 TABLET, FILM COATED ORAL at 06:50

## 2021-01-01 RX ADMIN — SENNA PLUS 10 MILLILITER(S): 8.6 TABLET ORAL at 22:05

## 2021-01-01 RX ADMIN — Medication 25 MILLIGRAM(S): at 06:40

## 2021-01-01 RX ADMIN — Medication 25 MILLIGRAM(S): at 17:51

## 2021-01-01 RX ADMIN — MIRTAZAPINE 7.5 MILLIGRAM(S): 45 TABLET, ORALLY DISINTEGRATING ORAL at 13:43

## 2021-01-01 RX ADMIN — Medication 200 MILLIGRAM(S): at 11:20

## 2021-01-01 RX ADMIN — CHLORHEXIDINE GLUCONATE 1 APPLICATION(S): 213 SOLUTION TOPICAL at 06:00

## 2021-01-01 RX ADMIN — OXYCODONE HYDROCHLORIDE 5 MILLIGRAM(S): 5 TABLET ORAL at 09:21

## 2021-01-01 RX ADMIN — QUETIAPINE FUMARATE 12.5 MILLIGRAM(S): 200 TABLET, FILM COATED ORAL at 22:16

## 2021-01-01 RX ADMIN — QUETIAPINE FUMARATE 25 MILLIGRAM(S): 200 TABLET, FILM COATED ORAL at 05:57

## 2021-01-01 RX ADMIN — PANTOPRAZOLE SODIUM 40 MILLIGRAM(S): 20 TABLET, DELAYED RELEASE ORAL at 11:02

## 2021-01-01 RX ADMIN — Medication 1000 UNIT(S): at 11:40

## 2021-01-01 RX ADMIN — INSULIN GLARGINE 8 UNIT(S): 100 INJECTION, SOLUTION SUBCUTANEOUS at 21:45

## 2021-01-01 RX ADMIN — MIRTAZAPINE 7.5 MILLIGRAM(S): 45 TABLET, ORALLY DISINTEGRATING ORAL at 13:58

## 2021-01-01 RX ADMIN — DIVALPROEX SODIUM 250 MILLIGRAM(S): 500 TABLET, DELAYED RELEASE ORAL at 18:57

## 2021-01-01 RX ADMIN — SEVELAMER CARBONATE 1600 MILLIGRAM(S): 2400 POWDER, FOR SUSPENSION ORAL at 09:05

## 2021-01-01 RX ADMIN — Medication 25 MICROGRAM(S): at 05:25

## 2021-01-01 RX ADMIN — MIDODRINE HYDROCHLORIDE 20 MILLIGRAM(S): 2.5 TABLET ORAL at 14:00

## 2021-01-01 RX ADMIN — DIVALPROEX SODIUM 250 MILLIGRAM(S): 500 TABLET, DELAYED RELEASE ORAL at 17:43

## 2021-01-01 RX ADMIN — Medication 25 MICROGRAM(S): at 05:37

## 2021-01-01 RX ADMIN — MEMANTINE HYDROCHLORIDE 5 MILLIGRAM(S): 10 TABLET ORAL at 21:47

## 2021-01-01 RX ADMIN — Medication 2 MILLIGRAM(S): at 11:45

## 2021-01-01 RX ADMIN — DULOXETINE HYDROCHLORIDE 20 MILLIGRAM(S): 30 CAPSULE, DELAYED RELEASE ORAL at 13:53

## 2021-01-01 RX ADMIN — CHLORHEXIDINE GLUCONATE 1 APPLICATION(S): 213 SOLUTION TOPICAL at 05:34

## 2021-01-01 RX ADMIN — CARBIDOPA AND LEVODOPA 2.5 TABLET(S): 25; 100 TABLET ORAL at 05:36

## 2021-01-01 RX ADMIN — Medication 1000 UNIT(S): at 13:57

## 2021-01-01 RX ADMIN — Medication 50 MILLIGRAM(S): at 18:18

## 2021-01-01 RX ADMIN — Medication 3 MILLILITER(S): at 11:51

## 2021-01-01 RX ADMIN — MIRTAZAPINE 7.5 MILLIGRAM(S): 45 TABLET, ORALLY DISINTEGRATING ORAL at 11:39

## 2021-01-01 RX ADMIN — Medication 1000 UNIT(S): at 12:06

## 2021-01-01 RX ADMIN — POLYETHYLENE GLYCOL 3350 17 GRAM(S): 17 POWDER, FOR SOLUTION ORAL at 17:25

## 2021-01-01 RX ADMIN — Medication 1: at 08:53

## 2021-01-01 RX ADMIN — Medication 50 MICROGRAM(S): at 05:46

## 2021-01-01 RX ADMIN — QUETIAPINE FUMARATE 25 MILLIGRAM(S): 200 TABLET, FILM COATED ORAL at 05:38

## 2021-01-01 RX ADMIN — Medication 60 MILLIGRAM(S): at 17:38

## 2021-01-01 RX ADMIN — Medication 50 MICROGRAM(S): at 06:07

## 2021-01-01 RX ADMIN — Medication 2 MILLIGRAM(S): at 06:30

## 2021-01-01 RX ADMIN — Medication 25 MICROGRAM(S): at 05:57

## 2021-01-01 RX ADMIN — Medication 1000 MILLIGRAM(S): at 09:20

## 2021-01-01 RX ADMIN — Medication 3 UNIT(S): at 08:12

## 2021-01-01 RX ADMIN — CHLORHEXIDINE GLUCONATE 1 APPLICATION(S): 213 SOLUTION TOPICAL at 05:14

## 2021-01-01 RX ADMIN — Medication 2 MILLIGRAM(S): at 21:35

## 2021-01-01 RX ADMIN — Medication 1 TABLET(S): at 12:31

## 2021-01-01 RX ADMIN — INSULIN GLARGINE 6 UNIT(S): 100 INJECTION, SOLUTION SUBCUTANEOUS at 21:17

## 2021-01-01 RX ADMIN — Medication 1 MILLIGRAM(S): at 15:45

## 2021-01-01 RX ADMIN — Medication 105 MILLIGRAM(S): at 22:40

## 2021-01-01 RX ADMIN — Medication 1000 UNIT(S): at 13:32

## 2021-01-01 RX ADMIN — QUETIAPINE FUMARATE 25 MILLIGRAM(S): 200 TABLET, FILM COATED ORAL at 13:59

## 2021-01-01 RX ADMIN — MIDODRINE HYDROCHLORIDE 10 MILLIGRAM(S): 2.5 TABLET ORAL at 13:51

## 2021-01-01 RX ADMIN — Medication 50 MICROGRAM(S): at 05:57

## 2021-01-01 RX ADMIN — INSULIN GLARGINE 11 UNIT(S): 100 INJECTION, SOLUTION SUBCUTANEOUS at 21:21

## 2021-01-01 RX ADMIN — CARBIDOPA AND LEVODOPA 2.5 TABLET(S): 25; 100 TABLET ORAL at 06:02

## 2021-01-01 RX ADMIN — Medication 2 MILLIGRAM(S): at 13:25

## 2021-01-01 RX ADMIN — CARBIDOPA AND LEVODOPA 2 TABLET(S): 25; 100 TABLET ORAL at 05:11

## 2021-01-01 RX ADMIN — QUETIAPINE FUMARATE 25 MILLIGRAM(S): 200 TABLET, FILM COATED ORAL at 05:37

## 2021-01-01 RX ADMIN — Medication 25 MILLIGRAM(S): at 00:45

## 2021-01-01 RX ADMIN — ATORVASTATIN CALCIUM 80 MILLIGRAM(S): 80 TABLET, FILM COATED ORAL at 22:03

## 2021-01-01 RX ADMIN — QUETIAPINE FUMARATE 12.5 MILLIGRAM(S): 200 TABLET, FILM COATED ORAL at 12:12

## 2021-01-01 RX ADMIN — Medication 2 MILLIGRAM(S): at 05:38

## 2021-01-01 RX ADMIN — DIVALPROEX SODIUM 250 MILLIGRAM(S): 500 TABLET, DELAYED RELEASE ORAL at 22:49

## 2021-01-01 RX ADMIN — Medication 50 MILLIGRAM(S): at 21:06

## 2021-01-01 RX ADMIN — Medication 2 MILLIGRAM(S): at 21:13

## 2021-01-01 RX ADMIN — POLYETHYLENE GLYCOL 3350 17 GRAM(S): 17 POWDER, FOR SOLUTION ORAL at 05:54

## 2021-01-01 RX ADMIN — HEPARIN SODIUM 5000 UNIT(S): 5000 INJECTION INTRAVENOUS; SUBCUTANEOUS at 05:22

## 2021-01-01 RX ADMIN — Medication 2 MILLIGRAM(S): at 23:26

## 2021-01-01 RX ADMIN — INSULIN GLARGINE 8 UNIT(S): 100 INJECTION, SOLUTION SUBCUTANEOUS at 21:53

## 2021-01-01 RX ADMIN — QUETIAPINE FUMARATE 25 MILLIGRAM(S): 200 TABLET, FILM COATED ORAL at 21:49

## 2021-01-01 RX ADMIN — QUETIAPINE FUMARATE 25 MILLIGRAM(S): 200 TABLET, FILM COATED ORAL at 22:05

## 2021-01-01 RX ADMIN — POLYETHYLENE GLYCOL 3350 17 GRAM(S): 17 POWDER, FOR SOLUTION ORAL at 06:10

## 2021-01-01 RX ADMIN — SENNA PLUS 10 MILLILITER(S): 8.6 TABLET ORAL at 21:17

## 2021-01-01 RX ADMIN — Medication 1000 UNIT(S): at 14:05

## 2021-01-01 RX ADMIN — CARBIDOPA AND LEVODOPA 2 TABLET(S): 25; 100 TABLET ORAL at 23:10

## 2021-01-01 RX ADMIN — CARBIDOPA AND LEVODOPA 2 TABLET(S): 25; 100 TABLET ORAL at 15:16

## 2021-01-01 RX ADMIN — Medication 650 MILLIGRAM(S): at 23:31

## 2021-01-01 RX ADMIN — PANTOPRAZOLE SODIUM 40 MILLIGRAM(S): 20 TABLET, DELAYED RELEASE ORAL at 13:14

## 2021-01-01 RX ADMIN — Medication 25 MILLIGRAM(S): at 18:00

## 2021-01-01 RX ADMIN — CARBIDOPA AND LEVODOPA 2 TABLET(S): 25; 100 TABLET ORAL at 21:17

## 2021-01-01 RX ADMIN — CARBIDOPA AND LEVODOPA 2.5 TABLET(S): 25; 100 TABLET ORAL at 05:14

## 2021-01-01 RX ADMIN — DEXMEDETOMIDINE HYDROCHLORIDE IN 0.9% SODIUM CHLORIDE 18.1 MICROGRAM(S)/KG/HR: 4 INJECTION INTRAVENOUS at 15:56

## 2021-01-01 RX ADMIN — Medication 1: at 17:26

## 2021-01-01 RX ADMIN — Medication 60 MILLIGRAM(S): at 17:04

## 2021-01-01 RX ADMIN — CHLORHEXIDINE GLUCONATE 1 APPLICATION(S): 213 SOLUTION TOPICAL at 06:18

## 2021-01-01 RX ADMIN — Medication 1 TABLET(S): at 13:30

## 2021-01-01 RX ADMIN — INSULIN GLARGINE 6 UNIT(S): 100 INJECTION, SOLUTION SUBCUTANEOUS at 22:36

## 2021-01-01 RX ADMIN — Medication 2 MILLIGRAM(S): at 06:05

## 2021-01-01 RX ADMIN — Medication 1: at 12:24

## 2021-01-01 RX ADMIN — QUETIAPINE FUMARATE 25 MILLIGRAM(S): 200 TABLET, FILM COATED ORAL at 21:18

## 2021-01-01 RX ADMIN — Medication 1000 UNIT(S): at 11:20

## 2021-01-01 RX ADMIN — ATORVASTATIN CALCIUM 80 MILLIGRAM(S): 80 TABLET, FILM COATED ORAL at 21:05

## 2021-01-01 RX ADMIN — Medication 1: at 22:45

## 2021-01-01 RX ADMIN — CARBIDOPA AND LEVODOPA 2.5 TABLET(S): 25; 100 TABLET ORAL at 13:40

## 2021-01-01 RX ADMIN — CARBIDOPA AND LEVODOPA 2.5 TABLET(S): 25; 100 TABLET ORAL at 06:01

## 2021-01-01 RX ADMIN — INSULIN GLARGINE 6 UNIT(S): 100 INJECTION, SOLUTION SUBCUTANEOUS at 21:52

## 2021-01-01 RX ADMIN — PIPERACILLIN AND TAZOBACTAM 25 GRAM(S): 4; .5 INJECTION, POWDER, LYOPHILIZED, FOR SOLUTION INTRAVENOUS at 05:27

## 2021-01-01 RX ADMIN — Medication 200 MILLIGRAM(S): at 22:54

## 2021-01-01 RX ADMIN — Medication 1334 MILLIGRAM(S): at 12:06

## 2021-01-01 RX ADMIN — Medication 2 MILLIGRAM(S): at 22:08

## 2021-01-01 RX ADMIN — Medication 25 MILLIGRAM(S): at 05:09

## 2021-01-01 RX ADMIN — Medication 50 MILLIGRAM(S): at 22:46

## 2021-01-01 RX ADMIN — CARBIDOPA AND LEVODOPA 2 TABLET(S): 25; 100 TABLET ORAL at 22:28

## 2021-01-01 RX ADMIN — Medication 667 MILLIGRAM(S): at 08:02

## 2021-01-01 RX ADMIN — Medication 1 TABLET(S): at 09:27

## 2021-01-01 RX ADMIN — Medication 1000 UNIT(S): at 12:33

## 2021-01-01 RX ADMIN — MEMANTINE HYDROCHLORIDE 5 MILLIGRAM(S): 10 TABLET ORAL at 21:34

## 2021-01-01 RX ADMIN — MIRTAZAPINE 7.5 MILLIGRAM(S): 45 TABLET, ORALLY DISINTEGRATING ORAL at 12:52

## 2021-01-01 RX ADMIN — Medication 2 MILLIGRAM(S): at 21:58

## 2021-01-01 RX ADMIN — MIDODRINE HYDROCHLORIDE 20 MILLIGRAM(S): 2.5 TABLET ORAL at 11:45

## 2021-01-01 RX ADMIN — CARBIDOPA AND LEVODOPA 2 TABLET(S): 25; 100 TABLET ORAL at 21:43

## 2021-01-01 RX ADMIN — Medication 1 MILLIGRAM(S): at 11:25

## 2021-01-01 RX ADMIN — SENNA PLUS 2 TABLET(S): 8.6 TABLET ORAL at 12:38

## 2021-01-01 RX ADMIN — QUETIAPINE FUMARATE 12.5 MILLIGRAM(S): 200 TABLET, FILM COATED ORAL at 13:13

## 2021-01-01 RX ADMIN — Medication 25 MILLIGRAM(S): at 06:02

## 2021-01-01 RX ADMIN — CARBIDOPA AND LEVODOPA 2.5 TABLET(S): 25; 100 TABLET ORAL at 06:08

## 2021-01-01 RX ADMIN — MEMANTINE HYDROCHLORIDE 5 MILLIGRAM(S): 10 TABLET ORAL at 22:27

## 2021-01-01 RX ADMIN — POLYETHYLENE GLYCOL 3350 17 GRAM(S): 17 POWDER, FOR SOLUTION ORAL at 05:45

## 2021-01-01 RX ADMIN — MIDODRINE HYDROCHLORIDE 20 MILLIGRAM(S): 2.5 TABLET ORAL at 21:49

## 2021-01-01 RX ADMIN — PANTOPRAZOLE SODIUM 40 MILLIGRAM(S): 20 TABLET, DELAYED RELEASE ORAL at 12:03

## 2021-01-01 RX ADMIN — Medication 2 MILLIGRAM(S): at 05:34

## 2021-01-01 RX ADMIN — Medication 650 MILLIGRAM(S): at 17:48

## 2021-01-01 RX ADMIN — CARBIDOPA AND LEVODOPA 2.5 TABLET(S): 25; 100 TABLET ORAL at 06:18

## 2021-01-01 RX ADMIN — Medication 2 MILLIGRAM(S): at 22:23

## 2021-01-01 RX ADMIN — POLYETHYLENE GLYCOL 3350 17 GRAM(S): 17 POWDER, FOR SOLUTION ORAL at 17:47

## 2021-01-01 RX ADMIN — Medication 650 MILLIGRAM(S): at 00:30

## 2021-01-01 RX ADMIN — QUETIAPINE FUMARATE 25 MILLIGRAM(S): 200 TABLET, FILM COATED ORAL at 16:00

## 2021-01-01 RX ADMIN — Medication 2 MILLIGRAM(S): at 22:04

## 2021-01-01 RX ADMIN — Medication 50 MILLIGRAM(S): at 22:57

## 2021-01-01 RX ADMIN — CHLORHEXIDINE GLUCONATE 1 APPLICATION(S): 213 SOLUTION TOPICAL at 06:59

## 2021-01-01 RX ADMIN — ATORVASTATIN CALCIUM 80 MILLIGRAM(S): 80 TABLET, FILM COATED ORAL at 22:16

## 2021-01-01 RX ADMIN — DIVALPROEX SODIUM 250 MILLIGRAM(S): 500 TABLET, DELAYED RELEASE ORAL at 05:02

## 2021-01-01 RX ADMIN — Medication 2 MILLIGRAM(S): at 21:04

## 2021-01-01 RX ADMIN — Medication 3: at 11:26

## 2021-01-01 RX ADMIN — ATORVASTATIN CALCIUM 80 MILLIGRAM(S): 80 TABLET, FILM COATED ORAL at 21:20

## 2021-01-01 RX ADMIN — Medication 1 TABLET(S): at 10:12

## 2021-01-01 RX ADMIN — Medication 60 MILLIGRAM(S): at 17:53

## 2021-01-01 RX ADMIN — QUETIAPINE FUMARATE 12.5 MILLIGRAM(S): 200 TABLET, FILM COATED ORAL at 21:05

## 2021-01-01 RX ADMIN — Medication 166.67 MILLIGRAM(S): at 12:02

## 2021-01-01 RX ADMIN — DIVALPROEX SODIUM 250 MILLIGRAM(S): 500 TABLET, DELAYED RELEASE ORAL at 06:40

## 2021-01-01 RX ADMIN — ERYTHROPOIETIN 14000 UNIT(S): 10000 INJECTION, SOLUTION INTRAVENOUS; SUBCUTANEOUS at 15:18

## 2021-01-01 RX ADMIN — ATORVASTATIN CALCIUM 80 MILLIGRAM(S): 80 TABLET, FILM COATED ORAL at 22:12

## 2021-01-01 RX ADMIN — SEVELAMER CARBONATE 1600 MILLIGRAM(S): 2400 POWDER, FOR SUSPENSION ORAL at 12:28

## 2021-01-01 RX ADMIN — Medication 1 MILLIGRAM(S): at 12:31

## 2021-01-01 RX ADMIN — QUETIAPINE FUMARATE 25 MILLIGRAM(S): 200 TABLET, FILM COATED ORAL at 21:22

## 2021-01-01 RX ADMIN — DULOXETINE HYDROCHLORIDE 20 MILLIGRAM(S): 30 CAPSULE, DELAYED RELEASE ORAL at 11:40

## 2021-01-01 RX ADMIN — Medication 1 MILLIGRAM(S): at 12:24

## 2021-01-01 RX ADMIN — MIDODRINE HYDROCHLORIDE 10 MILLIGRAM(S): 2.5 TABLET ORAL at 17:58

## 2021-01-01 RX ADMIN — QUETIAPINE FUMARATE 25 MILLIGRAM(S): 200 TABLET, FILM COATED ORAL at 13:09

## 2021-01-01 RX ADMIN — SENNA PLUS 10 MILLILITER(S): 8.6 TABLET ORAL at 21:31

## 2021-01-01 RX ADMIN — DIVALPROEX SODIUM 250 MILLIGRAM(S): 500 TABLET, DELAYED RELEASE ORAL at 16:01

## 2021-01-01 RX ADMIN — Medication 25 MILLIGRAM(S): at 08:44

## 2021-01-01 RX ADMIN — Medication 2 MILLIGRAM(S): at 21:34

## 2021-01-01 RX ADMIN — SENNA PLUS 10 MILLILITER(S): 8.6 TABLET ORAL at 22:45

## 2021-01-01 RX ADMIN — CINACALCET 60 MILLIGRAM(S): 30 TABLET, FILM COATED ORAL at 13:36

## 2021-01-01 RX ADMIN — Medication 50 MILLIGRAM(S): at 18:19

## 2021-01-01 RX ADMIN — MEMANTINE HYDROCHLORIDE 5 MILLIGRAM(S): 10 TABLET ORAL at 22:41

## 2021-01-01 RX ADMIN — MIDODRINE HYDROCHLORIDE 20 MILLIGRAM(S): 2.5 TABLET ORAL at 22:16

## 2021-01-01 RX ADMIN — Medication 25 MILLIGRAM(S): at 17:38

## 2021-01-01 RX ADMIN — Medication 1 MILLIGRAM(S): at 13:52

## 2021-01-01 RX ADMIN — DULOXETINE HYDROCHLORIDE 20 MILLIGRAM(S): 30 CAPSULE, DELAYED RELEASE ORAL at 13:03

## 2021-01-01 RX ADMIN — Medication 1 TABLET(S): at 12:35

## 2021-01-01 RX ADMIN — Medication 500 MILLIGRAM(S): at 11:51

## 2021-01-01 RX ADMIN — DIVALPROEX SODIUM 250 MILLIGRAM(S): 500 TABLET, DELAYED RELEASE ORAL at 08:41

## 2021-01-01 RX ADMIN — CARBIDOPA AND LEVODOPA 2 TABLET(S): 25; 100 TABLET ORAL at 05:10

## 2021-01-01 RX ADMIN — QUETIAPINE FUMARATE 12.5 MILLIGRAM(S): 200 TABLET, FILM COATED ORAL at 21:18

## 2021-01-01 RX ADMIN — CHLORHEXIDINE GLUCONATE 1 APPLICATION(S): 213 SOLUTION TOPICAL at 05:17

## 2021-01-01 RX ADMIN — Medication 2: at 08:20

## 2021-01-01 RX ADMIN — Medication 1 MILLIGRAM(S): at 11:40

## 2021-01-01 RX ADMIN — Medication 1000 UNIT(S): at 13:10

## 2021-01-01 RX ADMIN — Medication 1: at 05:24

## 2021-01-01 RX ADMIN — DIVALPROEX SODIUM 250 MILLIGRAM(S): 500 TABLET, DELAYED RELEASE ORAL at 21:04

## 2021-01-01 RX ADMIN — Medication 2 MILLIGRAM(S): at 05:22

## 2021-01-01 RX ADMIN — Medication 1 MILLIGRAM(S): at 14:55

## 2021-01-01 RX ADMIN — DIVALPROEX SODIUM 250 MILLIGRAM(S): 500 TABLET, DELAYED RELEASE ORAL at 14:02

## 2021-01-01 RX ADMIN — Medication 1000 UNIT(S): at 11:42

## 2021-01-01 RX ADMIN — CARBIDOPA AND LEVODOPA 2.5 TABLET(S): 25; 100 TABLET ORAL at 15:46

## 2021-01-01 RX ADMIN — QUETIAPINE FUMARATE 25 MILLIGRAM(S): 200 TABLET, FILM COATED ORAL at 22:20

## 2021-01-01 RX ADMIN — Medication 250 MILLIGRAM(S): at 09:40

## 2021-01-01 RX ADMIN — Medication 650 MILLIGRAM(S): at 06:57

## 2021-01-01 RX ADMIN — DIVALPROEX SODIUM 250 MILLIGRAM(S): 500 TABLET, DELAYED RELEASE ORAL at 14:00

## 2021-01-01 RX ADMIN — CHLORHEXIDINE GLUCONATE 1 APPLICATION(S): 213 SOLUTION TOPICAL at 07:50

## 2021-01-01 RX ADMIN — Medication 2 MILLIGRAM(S): at 05:23

## 2021-01-01 RX ADMIN — ATORVASTATIN CALCIUM 80 MILLIGRAM(S): 80 TABLET, FILM COATED ORAL at 23:28

## 2021-01-01 RX ADMIN — Medication 2 MILLIGRAM(S): at 21:15

## 2021-01-01 RX ADMIN — INSULIN GLARGINE 6 UNIT(S): 100 INJECTION, SOLUTION SUBCUTANEOUS at 22:28

## 2021-01-01 RX ADMIN — QUETIAPINE FUMARATE 25 MILLIGRAM(S): 200 TABLET, FILM COATED ORAL at 09:07

## 2021-01-01 RX ADMIN — Medication 25 MILLIGRAM(S): at 06:39

## 2021-01-01 RX ADMIN — DIVALPROEX SODIUM 250 MILLIGRAM(S): 500 TABLET, DELAYED RELEASE ORAL at 21:37

## 2021-01-01 RX ADMIN — Medication 1 MILLIGRAM(S): at 12:15

## 2021-01-01 RX ADMIN — Medication 50 MILLIGRAM(S): at 06:50

## 2021-01-01 RX ADMIN — MUPIROCIN 1 APPLICATION(S): 20 OINTMENT TOPICAL at 05:26

## 2021-01-01 RX ADMIN — CHLORHEXIDINE GLUCONATE 1 APPLICATION(S): 213 SOLUTION TOPICAL at 05:13

## 2021-01-01 RX ADMIN — Medication 2 MILLIGRAM(S): at 05:15

## 2021-01-01 RX ADMIN — Medication 5 MILLIGRAM(S): at 06:48

## 2021-01-01 RX ADMIN — CARBIDOPA AND LEVODOPA 2.5 TABLET(S): 25; 100 TABLET ORAL at 13:22

## 2021-01-01 RX ADMIN — Medication 2: at 11:56

## 2021-01-01 RX ADMIN — Medication 200 MILLIGRAM(S): at 00:38

## 2021-01-01 RX ADMIN — CHLORHEXIDINE GLUCONATE 1 APPLICATION(S): 213 SOLUTION TOPICAL at 07:06

## 2021-01-01 RX ADMIN — Medication 3 MILLILITER(S): at 00:43

## 2021-01-01 RX ADMIN — Medication 1 MILLIGRAM(S): at 13:38

## 2021-01-01 RX ADMIN — MEMANTINE HYDROCHLORIDE 5 MILLIGRAM(S): 10 TABLET ORAL at 21:18

## 2021-01-01 RX ADMIN — Medication 650 MILLIGRAM(S): at 16:17

## 2021-01-01 RX ADMIN — Medication 3 MILLILITER(S): at 05:21

## 2021-01-01 RX ADMIN — CARBIDOPA AND LEVODOPA 2.5 TABLET(S): 25; 100 TABLET ORAL at 13:42

## 2021-01-01 RX ADMIN — ONDANSETRON 4 MILLIGRAM(S): 8 TABLET, FILM COATED ORAL at 09:00

## 2021-01-01 RX ADMIN — ATORVASTATIN CALCIUM 80 MILLIGRAM(S): 80 TABLET, FILM COATED ORAL at 22:25

## 2021-01-01 RX ADMIN — ERYTHROPOIETIN 10000 UNIT(S): 10000 INJECTION, SOLUTION INTRAVENOUS; SUBCUTANEOUS at 15:08

## 2021-01-01 RX ADMIN — Medication 3 MILLILITER(S): at 17:22

## 2021-01-01 RX ADMIN — HALOPERIDOL DECANOATE 5 MILLIGRAM(S): 100 INJECTION INTRAMUSCULAR at 23:01

## 2021-01-01 RX ADMIN — Medication 50 MICROGRAM(S): at 05:23

## 2021-01-01 RX ADMIN — Medication 2 MILLIGRAM(S): at 13:44

## 2021-01-01 RX ADMIN — Medication 50 MICROGRAM(S): at 06:50

## 2021-01-01 RX ADMIN — MEMANTINE HYDROCHLORIDE 5 MILLIGRAM(S): 10 TABLET ORAL at 22:46

## 2021-01-01 RX ADMIN — CARBIDOPA AND LEVODOPA 2.5 TABLET(S): 25; 100 TABLET ORAL at 06:19

## 2021-01-01 RX ADMIN — Medication 2 MILLIGRAM(S): at 05:36

## 2021-01-01 RX ADMIN — DIVALPROEX SODIUM 250 MILLIGRAM(S): 500 TABLET, DELAYED RELEASE ORAL at 13:50

## 2021-01-01 RX ADMIN — QUETIAPINE FUMARATE 12.5 MILLIGRAM(S): 200 TABLET, FILM COATED ORAL at 23:22

## 2021-01-01 RX ADMIN — Medication 25 MILLIGRAM(S): at 17:40

## 2021-01-01 RX ADMIN — Medication 50 MICROGRAM(S): at 06:08

## 2021-01-01 RX ADMIN — HEPARIN SODIUM 5 UNIT(S)/HR: 5000 INJECTION INTRAVENOUS; SUBCUTANEOUS at 20:57

## 2021-01-01 RX ADMIN — Medication 1000 UNIT(S): at 10:45

## 2021-01-01 RX ADMIN — SENNA PLUS 10 MILLILITER(S): 8.6 TABLET ORAL at 22:47

## 2021-01-01 RX ADMIN — CHLORHEXIDINE GLUCONATE 1 APPLICATION(S): 213 SOLUTION TOPICAL at 12:04

## 2021-01-01 RX ADMIN — Medication 50 MICROGRAM(S): at 05:38

## 2021-01-01 RX ADMIN — Medication 0.5 MILLIGRAM(S): at 11:11

## 2021-01-01 RX ADMIN — ERYTHROPOIETIN 10000 UNIT(S): 10000 INJECTION, SOLUTION INTRAVENOUS; SUBCUTANEOUS at 10:12

## 2021-01-01 RX ADMIN — POLYETHYLENE GLYCOL 3350 17 GRAM(S): 17 POWDER, FOR SOLUTION ORAL at 05:30

## 2021-01-01 RX ADMIN — Medication 50 MILLIGRAM(S): at 09:30

## 2021-01-01 RX ADMIN — DIVALPROEX SODIUM 250 MILLIGRAM(S): 500 TABLET, DELAYED RELEASE ORAL at 21:54

## 2021-01-01 RX ADMIN — CARBIDOPA AND LEVODOPA 2.5 TABLET(S): 25; 100 TABLET ORAL at 05:26

## 2021-01-01 RX ADMIN — QUETIAPINE FUMARATE 25 MILLIGRAM(S): 200 TABLET, FILM COATED ORAL at 21:50

## 2021-01-01 RX ADMIN — CHLORHEXIDINE GLUCONATE 1 APPLICATION(S): 213 SOLUTION TOPICAL at 06:48

## 2021-01-01 RX ADMIN — POLYETHYLENE GLYCOL 3350 17 GRAM(S): 17 POWDER, FOR SOLUTION ORAL at 06:08

## 2021-01-01 RX ADMIN — MEMANTINE HYDROCHLORIDE 5 MILLIGRAM(S): 10 TABLET ORAL at 23:29

## 2021-01-01 RX ADMIN — Medication 650 MILLIGRAM(S): at 12:15

## 2021-01-01 RX ADMIN — ATORVASTATIN CALCIUM 80 MILLIGRAM(S): 80 TABLET, FILM COATED ORAL at 22:13

## 2021-01-01 RX ADMIN — QUETIAPINE FUMARATE 25 MILLIGRAM(S): 200 TABLET, FILM COATED ORAL at 06:07

## 2021-01-01 RX ADMIN — Medication 50 MICROGRAM(S): at 05:25

## 2021-01-01 RX ADMIN — Medication 2 MILLIGRAM(S): at 05:35

## 2021-01-01 RX ADMIN — CARBIDOPA AND LEVODOPA 2 TABLET(S): 25; 100 TABLET ORAL at 22:08

## 2021-01-01 RX ADMIN — QUETIAPINE FUMARATE 25 MILLIGRAM(S): 200 TABLET, FILM COATED ORAL at 14:02

## 2021-01-01 RX ADMIN — Medication 2 MILLIGRAM(S): at 22:39

## 2021-01-01 RX ADMIN — POLYETHYLENE GLYCOL 3350 17 GRAM(S): 17 POWDER, FOR SOLUTION ORAL at 17:39

## 2021-01-01 RX ADMIN — ONDANSETRON 4 MILLIGRAM(S): 8 TABLET, FILM COATED ORAL at 08:22

## 2021-01-01 RX ADMIN — SENNA PLUS 10 MILLILITER(S): 8.6 TABLET ORAL at 21:48

## 2021-01-01 RX ADMIN — CHLORHEXIDINE GLUCONATE 1 APPLICATION(S): 213 SOLUTION TOPICAL at 06:08

## 2021-01-01 RX ADMIN — PIPERACILLIN AND TAZOBACTAM 25 GRAM(S): 4; .5 INJECTION, POWDER, LYOPHILIZED, FOR SOLUTION INTRAVENOUS at 06:00

## 2021-01-01 RX ADMIN — Medication 81 MILLIGRAM(S): at 12:36

## 2021-01-01 RX ADMIN — POLYETHYLENE GLYCOL 3350 17 GRAM(S): 17 POWDER, FOR SOLUTION ORAL at 18:32

## 2021-01-01 RX ADMIN — Medication 25 MILLIGRAM(S): at 17:16

## 2021-01-01 RX ADMIN — CARBIDOPA AND LEVODOPA 2.5 TABLET(S): 25; 100 TABLET ORAL at 18:42

## 2021-01-01 RX ADMIN — SEVELAMER CARBONATE 1600 MILLIGRAM(S): 2400 POWDER, FOR SUSPENSION ORAL at 05:15

## 2021-01-01 RX ADMIN — Medication 105 MILLIGRAM(S): at 07:34

## 2021-01-01 RX ADMIN — CHLORHEXIDINE GLUCONATE 1 APPLICATION(S): 213 SOLUTION TOPICAL at 05:58

## 2021-01-01 RX ADMIN — Medication 1000 UNIT(S): at 11:44

## 2021-01-01 RX ADMIN — ATORVASTATIN CALCIUM 80 MILLIGRAM(S): 80 TABLET, FILM COATED ORAL at 22:23

## 2021-01-01 RX ADMIN — Medication 3 UNIT(S): at 07:58

## 2021-01-01 RX ADMIN — QUETIAPINE FUMARATE 25 MILLIGRAM(S): 200 TABLET, FILM COATED ORAL at 21:34

## 2021-01-01 RX ADMIN — POLYETHYLENE GLYCOL 3350 17 GRAM(S): 17 POWDER, FOR SOLUTION ORAL at 10:59

## 2021-01-01 RX ADMIN — Medication 50 MILLIGRAM(S): at 05:59

## 2021-01-01 RX ADMIN — SEVELAMER CARBONATE 1600 MILLIGRAM(S): 2400 POWDER, FOR SUSPENSION ORAL at 12:31

## 2021-01-01 RX ADMIN — Medication 650 MILLIGRAM(S): at 22:38

## 2021-01-01 RX ADMIN — CARBIDOPA AND LEVODOPA 2.5 TABLET(S): 25; 100 TABLET ORAL at 05:45

## 2021-01-01 RX ADMIN — CHLORHEXIDINE GLUCONATE 1 APPLICATION(S): 213 SOLUTION TOPICAL at 06:06

## 2021-01-01 RX ADMIN — Medication 650 MILLIGRAM(S): at 05:45

## 2021-01-01 RX ADMIN — Medication 1 MILLIGRAM(S): at 13:06

## 2021-01-01 RX ADMIN — DIVALPROEX SODIUM 250 MILLIGRAM(S): 500 TABLET, DELAYED RELEASE ORAL at 14:38

## 2021-01-01 RX ADMIN — Medication 500 MILLIGRAM(S): at 11:25

## 2021-01-01 RX ADMIN — Medication 1 MILLIGRAM(S): at 11:19

## 2021-01-01 RX ADMIN — Medication 25 MILLIGRAM(S): at 05:53

## 2021-01-01 RX ADMIN — Medication 650 MILLIGRAM(S): at 12:37

## 2021-01-01 RX ADMIN — CARBIDOPA AND LEVODOPA 2.5 TABLET(S): 25; 100 TABLET ORAL at 13:06

## 2021-01-01 RX ADMIN — Medication 1 TABLET(S): at 12:05

## 2021-01-01 RX ADMIN — Medication 60 MILLIGRAM(S): at 10:33

## 2021-01-01 RX ADMIN — Medication 25 MICROGRAM(S): at 06:23

## 2021-01-01 RX ADMIN — CARBIDOPA AND LEVODOPA 2 TABLET(S): 25; 100 TABLET ORAL at 05:22

## 2021-01-01 RX ADMIN — SENNA PLUS 10 MILLILITER(S): 8.6 TABLET ORAL at 21:29

## 2021-01-01 RX ADMIN — Medication 1: at 11:27

## 2021-01-01 RX ADMIN — SEVELAMER CARBONATE 1600 MILLIGRAM(S): 2400 POWDER, FOR SUSPENSION ORAL at 22:20

## 2021-01-01 RX ADMIN — Medication 2: at 08:14

## 2021-01-01 RX ADMIN — Medication 1000 UNIT(S): at 11:13

## 2021-01-01 RX ADMIN — Medication 1 MILLIGRAM(S): at 11:57

## 2021-01-01 RX ADMIN — INSULIN GLARGINE 6 UNIT(S): 100 INJECTION, SOLUTION SUBCUTANEOUS at 22:19

## 2021-01-01 RX ADMIN — Medication 25 MILLIGRAM(S): at 19:00

## 2021-01-01 RX ADMIN — SENNA PLUS 10 MILLILITER(S): 8.6 TABLET ORAL at 22:20

## 2021-01-01 RX ADMIN — Medication 650 MILLIGRAM(S): at 14:00

## 2021-01-01 RX ADMIN — HYDROMORPHONE HYDROCHLORIDE 0.5 MILLIGRAM(S): 2 INJECTION INTRAMUSCULAR; INTRAVENOUS; SUBCUTANEOUS at 20:05

## 2021-01-01 RX ADMIN — POLYETHYLENE GLYCOL 3350 17 GRAM(S): 17 POWDER, FOR SOLUTION ORAL at 18:39

## 2021-01-01 RX ADMIN — Medication 25 MILLIGRAM(S): at 18:32

## 2021-01-01 RX ADMIN — ONDANSETRON 4 MILLIGRAM(S): 8 TABLET, FILM COATED ORAL at 18:32

## 2021-01-01 RX ADMIN — CHLORHEXIDINE GLUCONATE 1 APPLICATION(S): 213 SOLUTION TOPICAL at 12:23

## 2021-01-01 RX ADMIN — POLYETHYLENE GLYCOL 3350 17 GRAM(S): 17 POWDER, FOR SOLUTION ORAL at 19:20

## 2021-01-01 RX ADMIN — SENNA PLUS 10 MILLILITER(S): 8.6 TABLET ORAL at 21:20

## 2021-01-01 RX ADMIN — INSULIN GLARGINE 6 UNIT(S): 100 INJECTION, SOLUTION SUBCUTANEOUS at 22:44

## 2021-01-01 RX ADMIN — DIVALPROEX SODIUM 250 MILLIGRAM(S): 500 TABLET, DELAYED RELEASE ORAL at 13:11

## 2021-01-01 RX ADMIN — QUETIAPINE FUMARATE 12.5 MILLIGRAM(S): 200 TABLET, FILM COATED ORAL at 12:58

## 2021-01-01 RX ADMIN — Medication 650 MILLIGRAM(S): at 18:50

## 2021-01-01 RX ADMIN — Medication 500 MILLIGRAM(S): at 11:13

## 2021-01-01 RX ADMIN — Medication 5 UNIT(S): at 07:58

## 2021-01-01 RX ADMIN — Medication 1000 UNIT(S): at 12:13

## 2021-01-01 RX ADMIN — Medication 1000 UNIT(S): at 12:24

## 2021-01-01 RX ADMIN — Medication 3 UNIT(S): at 17:26

## 2021-01-01 RX ADMIN — Medication 650 MILLIGRAM(S): at 23:28

## 2021-01-01 RX ADMIN — Medication 1334 MILLIGRAM(S): at 12:36

## 2021-01-01 RX ADMIN — Medication 2 MILLIGRAM(S): at 12:32

## 2021-01-01 RX ADMIN — INSULIN GLARGINE 6 UNIT(S): 100 INJECTION, SOLUTION SUBCUTANEOUS at 22:00

## 2021-01-01 RX ADMIN — Medication 25 MILLIGRAM(S): at 05:10

## 2021-01-01 RX ADMIN — ATORVASTATIN CALCIUM 80 MILLIGRAM(S): 80 TABLET, FILM COATED ORAL at 21:17

## 2021-01-01 RX ADMIN — Medication 2 MILLIGRAM(S): at 06:51

## 2021-01-01 RX ADMIN — CARBIDOPA AND LEVODOPA 2.5 TABLET(S): 25; 100 TABLET ORAL at 06:39

## 2021-01-01 RX ADMIN — DULOXETINE HYDROCHLORIDE 20 MILLIGRAM(S): 30 CAPSULE, DELAYED RELEASE ORAL at 12:31

## 2021-01-01 RX ADMIN — QUETIAPINE FUMARATE 12.5 MILLIGRAM(S): 200 TABLET, FILM COATED ORAL at 12:03

## 2021-01-01 RX ADMIN — Medication 1: at 17:38

## 2021-01-01 RX ADMIN — Medication 2 MILLIGRAM(S): at 05:25

## 2021-01-01 RX ADMIN — DULOXETINE HYDROCHLORIDE 20 MILLIGRAM(S): 30 CAPSULE, DELAYED RELEASE ORAL at 13:50

## 2021-01-01 RX ADMIN — SENNA PLUS 2 TABLET(S): 8.6 TABLET ORAL at 21:53

## 2021-01-01 RX ADMIN — CHLORHEXIDINE GLUCONATE 1 APPLICATION(S): 213 SOLUTION TOPICAL at 06:56

## 2021-01-01 RX ADMIN — Medication 2 MILLIGRAM(S): at 21:36

## 2021-01-01 RX ADMIN — ATORVASTATIN CALCIUM 80 MILLIGRAM(S): 80 TABLET, FILM COATED ORAL at 21:28

## 2021-01-01 RX ADMIN — ATORVASTATIN CALCIUM 80 MILLIGRAM(S): 80 TABLET, FILM COATED ORAL at 22:44

## 2021-01-01 RX ADMIN — Medication 3 UNIT(S): at 12:07

## 2021-01-01 RX ADMIN — Medication 1000 UNIT(S): at 12:34

## 2021-01-01 RX ADMIN — SENNA PLUS 10 MILLILITER(S): 8.6 TABLET ORAL at 23:38

## 2021-01-01 RX ADMIN — QUETIAPINE FUMARATE 12.5 MILLIGRAM(S): 200 TABLET, FILM COATED ORAL at 05:20

## 2021-01-01 RX ADMIN — Medication 2 MILLIGRAM(S): at 15:27

## 2021-01-01 RX ADMIN — POLYETHYLENE GLYCOL 3350 17 GRAM(S): 17 POWDER, FOR SOLUTION ORAL at 18:19

## 2021-01-01 RX ADMIN — SENNA PLUS 2 TABLET(S): 8.6 TABLET ORAL at 21:51

## 2021-01-01 RX ADMIN — DEXMEDETOMIDINE HYDROCHLORIDE IN 0.9% SODIUM CHLORIDE 4.82 MICROGRAM(S)/KG/HR: 4 INJECTION INTRAVENOUS at 12:47

## 2021-01-01 RX ADMIN — Medication 1 MILLIGRAM(S): at 13:42

## 2021-01-01 RX ADMIN — Medication 1: at 17:40

## 2021-01-01 RX ADMIN — Medication 2: at 05:51

## 2021-01-01 RX ADMIN — CARBIDOPA AND LEVODOPA 1 TABLET(S): 25; 100 TABLET ORAL at 12:04

## 2021-01-01 RX ADMIN — CARBIDOPA AND LEVODOPA 2.5 TABLET(S): 25; 100 TABLET ORAL at 15:08

## 2021-01-01 RX ADMIN — Medication 25 MICROGRAM(S): at 06:29

## 2021-01-01 RX ADMIN — QUETIAPINE FUMARATE 25 MILLIGRAM(S): 200 TABLET, FILM COATED ORAL at 22:17

## 2021-01-01 RX ADMIN — PIPERACILLIN AND TAZOBACTAM 25 GRAM(S): 4; .5 INJECTION, POWDER, LYOPHILIZED, FOR SOLUTION INTRAVENOUS at 05:33

## 2021-01-01 RX ADMIN — Medication 50 MICROGRAM(S): at 05:58

## 2021-01-01 RX ADMIN — Medication 1000 UNIT(S): at 12:52

## 2021-01-01 RX ADMIN — INSULIN GLARGINE 6 UNIT(S): 100 INJECTION, SOLUTION SUBCUTANEOUS at 22:14

## 2021-01-01 RX ADMIN — Medication 2 MILLIGRAM(S): at 13:40

## 2021-01-01 RX ADMIN — MORPHINE SULFATE 2 MILLIGRAM(S): 50 CAPSULE, EXTENDED RELEASE ORAL at 12:32

## 2021-01-01 RX ADMIN — ATORVASTATIN CALCIUM 80 MILLIGRAM(S): 80 TABLET, FILM COATED ORAL at 22:40

## 2021-01-01 RX ADMIN — Medication 2 MILLIGRAM(S): at 22:24

## 2021-01-01 RX ADMIN — DIVALPROEX SODIUM 250 MILLIGRAM(S): 500 TABLET, DELAYED RELEASE ORAL at 05:35

## 2021-01-01 RX ADMIN — Medication 5 UNIT(S): at 11:56

## 2021-01-01 RX ADMIN — Medication 500 MILLIGRAM(S): at 12:23

## 2021-01-01 RX ADMIN — Medication 25 MILLIGRAM(S): at 18:08

## 2021-01-01 RX ADMIN — MEMANTINE HYDROCHLORIDE 5 MILLIGRAM(S): 10 TABLET ORAL at 00:12

## 2021-01-01 RX ADMIN — CARBIDOPA AND LEVODOPA 2 TABLET(S): 25; 100 TABLET ORAL at 22:57

## 2021-01-01 RX ADMIN — Medication 650 MILLIGRAM(S): at 17:47

## 2021-01-01 RX ADMIN — Medication 667 MILLIGRAM(S): at 08:19

## 2021-01-01 RX ADMIN — CARBIDOPA AND LEVODOPA 2 TABLET(S): 25; 100 TABLET ORAL at 22:20

## 2021-01-01 RX ADMIN — CHLORHEXIDINE GLUCONATE 1 APPLICATION(S): 213 SOLUTION TOPICAL at 12:46

## 2021-01-01 RX ADMIN — CARBIDOPA AND LEVODOPA 2.5 TABLET(S): 25; 100 TABLET ORAL at 06:09

## 2021-01-01 RX ADMIN — CARBIDOPA AND LEVODOPA 2 TABLET(S): 25; 100 TABLET ORAL at 21:16

## 2021-01-01 RX ADMIN — CARBIDOPA AND LEVODOPA 2.5 TABLET(S): 25; 100 TABLET ORAL at 17:15

## 2021-01-01 RX ADMIN — Medication 25 MICROGRAM(S): at 05:32

## 2021-01-01 RX ADMIN — Medication 500 MILLIGRAM(S): at 11:42

## 2021-01-01 RX ADMIN — POLYETHYLENE GLYCOL 3350 17 GRAM(S): 17 POWDER, FOR SOLUTION ORAL at 05:56

## 2021-01-01 RX ADMIN — Medication 1: at 08:15

## 2021-01-01 RX ADMIN — POLYETHYLENE GLYCOL 3350 17 GRAM(S): 17 POWDER, FOR SOLUTION ORAL at 06:22

## 2021-01-01 RX ADMIN — Medication 25 MILLIGRAM(S): at 05:39

## 2021-01-01 RX ADMIN — CARBIDOPA AND LEVODOPA 2.5 TABLET(S): 25; 100 TABLET ORAL at 13:04

## 2021-01-01 RX ADMIN — POLYETHYLENE GLYCOL 3350 17 GRAM(S): 17 POWDER, FOR SOLUTION ORAL at 17:17

## 2021-01-01 RX ADMIN — CARBIDOPA AND LEVODOPA 2 TABLET(S): 25; 100 TABLET ORAL at 22:04

## 2021-01-01 RX ADMIN — Medication 500 MILLIGRAM(S): at 12:35

## 2021-01-01 RX ADMIN — DIVALPROEX SODIUM 250 MILLIGRAM(S): 500 TABLET, DELAYED RELEASE ORAL at 21:29

## 2021-01-01 RX ADMIN — POLYETHYLENE GLYCOL 3350 17 GRAM(S): 17 POWDER, FOR SOLUTION ORAL at 18:57

## 2021-01-01 RX ADMIN — CARBIDOPA AND LEVODOPA 2 TABLET(S): 25; 100 TABLET ORAL at 22:45

## 2021-01-01 RX ADMIN — CHLORHEXIDINE GLUCONATE 1 APPLICATION(S): 213 SOLUTION TOPICAL at 06:35

## 2021-01-01 RX ADMIN — DULOXETINE HYDROCHLORIDE 20 MILLIGRAM(S): 30 CAPSULE, DELAYED RELEASE ORAL at 09:28

## 2021-01-01 RX ADMIN — Medication 2 MILLIGRAM(S): at 05:44

## 2021-01-01 RX ADMIN — POLYETHYLENE GLYCOL 3350 17 GRAM(S): 17 POWDER, FOR SOLUTION ORAL at 05:04

## 2021-01-01 RX ADMIN — MIDODRINE HYDROCHLORIDE 10 MILLIGRAM(S): 2.5 TABLET ORAL at 12:32

## 2021-01-01 RX ADMIN — DIVALPROEX SODIUM 250 MILLIGRAM(S): 500 TABLET, DELAYED RELEASE ORAL at 17:21

## 2021-01-01 RX ADMIN — MIDODRINE HYDROCHLORIDE 10 MILLIGRAM(S): 2.5 TABLET ORAL at 22:10

## 2021-01-01 RX ADMIN — Medication 500 MILLIGRAM(S): at 11:55

## 2021-01-01 RX ADMIN — CARBIDOPA AND LEVODOPA 2.5 TABLET(S): 25; 100 TABLET ORAL at 14:28

## 2021-01-01 RX ADMIN — QUETIAPINE FUMARATE 25 MILLIGRAM(S): 200 TABLET, FILM COATED ORAL at 06:22

## 2021-01-01 RX ADMIN — Medication 2 MILLIGRAM(S): at 05:32

## 2021-01-01 RX ADMIN — CARBIDOPA AND LEVODOPA 2 TABLET(S): 25; 100 TABLET ORAL at 22:38

## 2021-01-01 RX ADMIN — INSULIN GLARGINE 8 UNIT(S): 100 INJECTION, SOLUTION SUBCUTANEOUS at 21:38

## 2021-01-01 RX ADMIN — Medication 25 MILLIGRAM(S): at 05:18

## 2021-01-01 RX ADMIN — Medication 1000 UNIT(S): at 18:36

## 2021-01-01 RX ADMIN — Medication 1000 UNIT(S): at 13:13

## 2021-01-01 RX ADMIN — Medication 25 MILLIGRAM(S): at 06:51

## 2021-01-01 RX ADMIN — DIVALPROEX SODIUM 250 MILLIGRAM(S): 500 TABLET, DELAYED RELEASE ORAL at 17:40

## 2021-01-01 RX ADMIN — Medication 1 MILLIGRAM(S): at 12:30

## 2021-01-01 RX ADMIN — MEMANTINE HYDROCHLORIDE 5 MILLIGRAM(S): 10 TABLET ORAL at 21:59

## 2021-01-01 RX ADMIN — INSULIN GLARGINE 6 UNIT(S): 100 INJECTION, SOLUTION SUBCUTANEOUS at 21:49

## 2021-01-01 RX ADMIN — Medication 650 MILLIGRAM(S): at 14:26

## 2021-01-01 RX ADMIN — Medication 2 MILLIGRAM(S): at 21:41

## 2021-01-01 RX ADMIN — MIDODRINE HYDROCHLORIDE 20 MILLIGRAM(S): 2.5 TABLET ORAL at 22:29

## 2021-01-01 RX ADMIN — INSULIN GLARGINE 6 UNIT(S): 100 INJECTION, SOLUTION SUBCUTANEOUS at 22:49

## 2021-01-01 RX ADMIN — CARBIDOPA AND LEVODOPA 2.5 TABLET(S): 25; 100 TABLET ORAL at 05:29

## 2021-01-01 RX ADMIN — Medication 650 MILLIGRAM(S): at 23:52

## 2021-01-01 RX ADMIN — ERYTHROPOIETIN 10000 UNIT(S): 10000 INJECTION, SOLUTION INTRAVENOUS; SUBCUTANEOUS at 17:52

## 2021-01-01 RX ADMIN — Medication 650 MILLIGRAM(S): at 12:22

## 2021-01-01 RX ADMIN — QUETIAPINE FUMARATE 25 MILLIGRAM(S): 200 TABLET, FILM COATED ORAL at 22:53

## 2021-01-01 RX ADMIN — Medication 1: at 08:40

## 2021-01-01 RX ADMIN — MIDODRINE HYDROCHLORIDE 20 MILLIGRAM(S): 2.5 TABLET ORAL at 13:50

## 2021-01-01 RX ADMIN — Medication 1 MILLIGRAM(S): at 11:35

## 2021-01-01 RX ADMIN — QUETIAPINE FUMARATE 12.5 MILLIGRAM(S): 200 TABLET, FILM COATED ORAL at 22:47

## 2021-01-01 RX ADMIN — MIDODRINE HYDROCHLORIDE 20 MILLIGRAM(S): 2.5 TABLET ORAL at 05:55

## 2021-01-01 RX ADMIN — SEVELAMER CARBONATE 1600 MILLIGRAM(S): 2400 POWDER, FOR SUSPENSION ORAL at 09:08

## 2021-01-01 RX ADMIN — Medication 50 MILLIGRAM(S): at 12:54

## 2021-01-01 RX ADMIN — Medication 50 MILLIGRAM(S): at 17:24

## 2021-01-01 RX ADMIN — Medication 60 MILLIGRAM(S): at 05:37

## 2021-01-01 RX ADMIN — POLYETHYLENE GLYCOL 3350 17 GRAM(S): 17 POWDER, FOR SOLUTION ORAL at 06:03

## 2021-01-01 RX ADMIN — QUETIAPINE FUMARATE 25 MILLIGRAM(S): 200 TABLET, FILM COATED ORAL at 06:36

## 2021-01-01 RX ADMIN — POLYETHYLENE GLYCOL 3350 17 GRAM(S): 17 POWDER, FOR SOLUTION ORAL at 12:38

## 2021-01-01 RX ADMIN — CHLORHEXIDINE GLUCONATE 1 APPLICATION(S): 213 SOLUTION TOPICAL at 06:41

## 2021-01-01 RX ADMIN — Medication 1 TABLET(S): at 13:13

## 2021-01-01 RX ADMIN — Medication 2 MILLIGRAM(S): at 22:06

## 2021-01-01 RX ADMIN — QUETIAPINE FUMARATE 25 MILLIGRAM(S): 200 TABLET, FILM COATED ORAL at 21:27

## 2021-01-01 RX ADMIN — SENNA PLUS 10 MILLILITER(S): 8.6 TABLET ORAL at 21:14

## 2021-01-01 RX ADMIN — MEMANTINE HYDROCHLORIDE 5 MILLIGRAM(S): 10 TABLET ORAL at 22:42

## 2021-01-01 RX ADMIN — SENNA PLUS 10 MILLILITER(S): 8.6 TABLET ORAL at 21:37

## 2021-01-01 RX ADMIN — Medication 60 MILLIGRAM(S): at 17:40

## 2021-01-01 RX ADMIN — SEVELAMER CARBONATE 1600 MILLIGRAM(S): 2400 POWDER, FOR SUSPENSION ORAL at 15:27

## 2021-01-01 RX ADMIN — Medication 60 MILLIGRAM(S): at 05:22

## 2021-01-01 RX ADMIN — Medication 650 MILLIGRAM(S): at 11:45

## 2021-01-01 RX ADMIN — ATORVASTATIN CALCIUM 80 MILLIGRAM(S): 80 TABLET, FILM COATED ORAL at 21:30

## 2021-01-01 RX ADMIN — Medication 650 MILLIGRAM(S): at 23:07

## 2021-01-01 RX ADMIN — Medication 1 MILLIGRAM(S): at 13:31

## 2021-01-01 RX ADMIN — MIDODRINE HYDROCHLORIDE 20 MILLIGRAM(S): 2.5 TABLET ORAL at 11:01

## 2021-01-01 RX ADMIN — Medication 650 MILLIGRAM(S): at 18:45

## 2021-01-01 RX ADMIN — Medication 1 TABLET(S): at 11:45

## 2021-01-01 RX ADMIN — Medication 25 MILLIGRAM(S): at 05:21

## 2021-01-01 RX ADMIN — SEVELAMER CARBONATE 1600 MILLIGRAM(S): 2400 POWDER, FOR SUSPENSION ORAL at 13:56

## 2021-01-01 RX ADMIN — Medication 2 MILLIGRAM(S): at 11:57

## 2021-01-01 RX ADMIN — Medication 1: at 08:12

## 2021-01-01 RX ADMIN — ATORVASTATIN CALCIUM 80 MILLIGRAM(S): 80 TABLET, FILM COATED ORAL at 22:06

## 2021-01-01 RX ADMIN — CARBIDOPA AND LEVODOPA 2.5 TABLET(S): 25; 100 TABLET ORAL at 13:41

## 2021-01-01 RX ADMIN — INSULIN GLARGINE 8 UNIT(S): 100 INJECTION, SOLUTION SUBCUTANEOUS at 22:45

## 2021-01-01 RX ADMIN — Medication 1: at 17:17

## 2021-01-01 RX ADMIN — DULOXETINE HYDROCHLORIDE 20 MILLIGRAM(S): 30 CAPSULE, DELAYED RELEASE ORAL at 10:13

## 2021-01-01 RX ADMIN — Medication 2 MILLIGRAM(S): at 18:37

## 2021-01-01 RX ADMIN — QUETIAPINE FUMARATE 25 MILLIGRAM(S): 200 TABLET, FILM COATED ORAL at 14:00

## 2021-01-01 RX ADMIN — POLYETHYLENE GLYCOL 3350 17 GRAM(S): 17 POWDER, FOR SOLUTION ORAL at 06:55

## 2021-01-01 RX ADMIN — ERYTHROPOIETIN 10000 UNIT(S): 10000 INJECTION, SOLUTION INTRAVENOUS; SUBCUTANEOUS at 20:27

## 2021-01-01 RX ADMIN — DIVALPROEX SODIUM 250 MILLIGRAM(S): 500 TABLET, DELAYED RELEASE ORAL at 05:46

## 2021-01-01 RX ADMIN — INSULIN GLARGINE 6 UNIT(S): 100 INJECTION, SOLUTION SUBCUTANEOUS at 21:40

## 2021-01-01 RX ADMIN — Medication 1 MILLIGRAM(S): at 11:55

## 2021-01-01 RX ADMIN — Medication 667 MILLIGRAM(S): at 11:52

## 2021-01-01 RX ADMIN — POLYETHYLENE GLYCOL 3350 17 GRAM(S): 17 POWDER, FOR SOLUTION ORAL at 05:59

## 2021-01-01 RX ADMIN — Medication 500 MILLIGRAM(S): at 11:57

## 2021-01-01 RX ADMIN — DIVALPROEX SODIUM 125 MILLIGRAM(S): 500 TABLET, DELAYED RELEASE ORAL at 18:49

## 2021-01-01 RX ADMIN — SEVELAMER CARBONATE 1600 MILLIGRAM(S): 2400 POWDER, FOR SUSPENSION ORAL at 13:12

## 2021-01-01 RX ADMIN — POLYETHYLENE GLYCOL 3350 17 GRAM(S): 17 POWDER, FOR SOLUTION ORAL at 17:36

## 2021-01-01 RX ADMIN — Medication 2 MILLIGRAM(S): at 21:12

## 2021-01-01 RX ADMIN — POLYETHYLENE GLYCOL 3350 17 GRAM(S): 17 POWDER, FOR SOLUTION ORAL at 06:30

## 2021-01-01 RX ADMIN — Medication 2: at 11:31

## 2021-01-01 RX ADMIN — CARBIDOPA AND LEVODOPA 2.5 TABLET(S): 25; 100 TABLET ORAL at 13:03

## 2021-01-01 RX ADMIN — Medication 2 MILLIGRAM(S): at 15:09

## 2021-01-01 RX ADMIN — Medication 500 MILLIGRAM(S): at 12:46

## 2021-01-01 RX ADMIN — MEMANTINE HYDROCHLORIDE 5 MILLIGRAM(S): 10 TABLET ORAL at 21:14

## 2021-01-01 RX ADMIN — Medication 2: at 12:06

## 2021-01-01 RX ADMIN — POLYETHYLENE GLYCOL 3350 17 GRAM(S): 17 POWDER, FOR SOLUTION ORAL at 08:43

## 2021-01-01 RX ADMIN — CARBIDOPA AND LEVODOPA 2 TABLET(S): 25; 100 TABLET ORAL at 21:41

## 2021-01-01 RX ADMIN — ERYTHROPOIETIN 10000 UNIT(S): 10000 INJECTION, SOLUTION INTRAVENOUS; SUBCUTANEOUS at 14:41

## 2021-01-01 RX ADMIN — QUETIAPINE FUMARATE 12.5 MILLIGRAM(S): 200 TABLET, FILM COATED ORAL at 12:26

## 2021-01-01 RX ADMIN — Medication 1 TABLET(S): at 12:12

## 2021-01-01 RX ADMIN — Medication 1 TABLET(S): at 11:19

## 2021-01-01 RX ADMIN — SENNA PLUS 10 MILLILITER(S): 8.6 TABLET ORAL at 21:35

## 2021-01-01 RX ADMIN — DIVALPROEX SODIUM 250 MILLIGRAM(S): 500 TABLET, DELAYED RELEASE ORAL at 17:08

## 2021-01-01 RX ADMIN — Medication 2 MILLIGRAM(S): at 21:05

## 2021-01-01 RX ADMIN — CINACALCET 60 MILLIGRAM(S): 30 TABLET, FILM COATED ORAL at 11:40

## 2021-01-01 RX ADMIN — Medication 50 MILLIGRAM(S): at 21:05

## 2021-01-01 RX ADMIN — Medication 2 MILLIGRAM(S): at 05:48

## 2021-01-01 RX ADMIN — Medication 1 TABLET(S): at 12:26

## 2021-01-01 RX ADMIN — Medication 1 MILLIGRAM(S): at 11:01

## 2021-01-01 RX ADMIN — POLYETHYLENE GLYCOL 3350 17 GRAM(S): 17 POWDER, FOR SOLUTION ORAL at 05:34

## 2021-01-01 RX ADMIN — SEVELAMER CARBONATE 1600 MILLIGRAM(S): 2400 POWDER, FOR SUSPENSION ORAL at 05:45

## 2021-01-01 RX ADMIN — DIVALPROEX SODIUM 250 MILLIGRAM(S): 500 TABLET, DELAYED RELEASE ORAL at 05:29

## 2021-01-01 RX ADMIN — Medication 1 TABLET(S): at 13:59

## 2021-01-01 RX ADMIN — HEPARIN SODIUM 5000 UNIT(S): 5000 INJECTION INTRAVENOUS; SUBCUTANEOUS at 18:35

## 2021-01-01 RX ADMIN — DIVALPROEX SODIUM 250 MILLIGRAM(S): 500 TABLET, DELAYED RELEASE ORAL at 17:48

## 2021-01-01 RX ADMIN — Medication 2 MILLIGRAM(S): at 13:00

## 2021-01-01 RX ADMIN — Medication 2 MILLIGRAM(S): at 21:47

## 2021-01-01 RX ADMIN — SEVELAMER CARBONATE 1600 MILLIGRAM(S): 2400 POWDER, FOR SUSPENSION ORAL at 12:47

## 2021-01-01 RX ADMIN — INSULIN GLARGINE 6 UNIT(S): 100 INJECTION, SOLUTION SUBCUTANEOUS at 22:40

## 2021-01-01 RX ADMIN — QUETIAPINE FUMARATE 12.5 MILLIGRAM(S): 200 TABLET, FILM COATED ORAL at 13:04

## 2021-01-01 RX ADMIN — Medication 500 MILLIGRAM(S): at 12:30

## 2021-01-01 RX ADMIN — MEMANTINE HYDROCHLORIDE 5 MILLIGRAM(S): 10 TABLET ORAL at 21:33

## 2021-01-01 RX ADMIN — Medication 1: at 00:24

## 2021-01-01 RX ADMIN — Medication 1 MILLIGRAM(S): at 12:04

## 2021-01-01 RX ADMIN — CARBIDOPA AND LEVODOPA 2.5 TABLET(S): 25; 100 TABLET ORAL at 14:37

## 2021-01-01 RX ADMIN — CHLORHEXIDINE GLUCONATE 1 APPLICATION(S): 213 SOLUTION TOPICAL at 05:48

## 2021-01-01 RX ADMIN — ENOXAPARIN SODIUM 100 MILLIGRAM(S): 100 INJECTION SUBCUTANEOUS at 12:13

## 2021-01-01 RX ADMIN — PIPERACILLIN AND TAZOBACTAM 25 GRAM(S): 4; .5 INJECTION, POWDER, LYOPHILIZED, FOR SOLUTION INTRAVENOUS at 18:08

## 2021-01-01 RX ADMIN — Medication 650 MILLIGRAM(S): at 23:00

## 2021-01-01 RX ADMIN — ERYTHROPOIETIN 10000 UNIT(S): 10000 INJECTION, SOLUTION INTRAVENOUS; SUBCUTANEOUS at 10:21

## 2021-01-01 RX ADMIN — ATORVASTATIN CALCIUM 80 MILLIGRAM(S): 80 TABLET, FILM COATED ORAL at 21:34

## 2021-01-01 RX ADMIN — Medication 1000 UNIT(S): at 13:40

## 2021-01-01 RX ADMIN — Medication 1 TABLET(S): at 12:38

## 2021-01-01 RX ADMIN — Medication 25 MILLIGRAM(S): at 18:52

## 2021-01-01 RX ADMIN — CARBIDOPA AND LEVODOPA 2.5 TABLET(S): 25; 100 TABLET ORAL at 14:00

## 2021-01-01 RX ADMIN — Medication 500 MILLIGRAM(S): at 11:01

## 2021-01-01 RX ADMIN — Medication 50 MILLIGRAM(S): at 10:13

## 2021-01-01 RX ADMIN — Medication 1: at 17:03

## 2021-01-01 RX ADMIN — CARBIDOPA AND LEVODOPA 2.5 TABLET(S): 25; 100 TABLET ORAL at 06:50

## 2021-01-01 RX ADMIN — CHLORHEXIDINE GLUCONATE 1 APPLICATION(S): 213 SOLUTION TOPICAL at 11:14

## 2021-01-01 RX ADMIN — CHLORHEXIDINE GLUCONATE 1 APPLICATION(S): 213 SOLUTION TOPICAL at 05:54

## 2021-01-01 RX ADMIN — Medication 400 MILLIGRAM(S): at 08:51

## 2021-01-01 RX ADMIN — Medication 650 MILLIGRAM(S): at 03:36

## 2021-01-01 RX ADMIN — SEVELAMER CARBONATE 1600 MILLIGRAM(S): 2400 POWDER, FOR SUSPENSION ORAL at 22:28

## 2021-01-01 RX ADMIN — Medication 2 MILLIGRAM(S): at 05:13

## 2021-01-01 RX ADMIN — HEPARIN SODIUM 1900 UNIT(S)/HR: 5000 INJECTION INTRAVENOUS; SUBCUTANEOUS at 11:01

## 2021-01-01 RX ADMIN — Medication 667 MILLIGRAM(S): at 09:50

## 2021-01-01 RX ADMIN — Medication 2 MILLIGRAM(S): at 22:20

## 2021-01-01 RX ADMIN — HEPARIN SODIUM 5000 UNIT(S): 5000 INJECTION INTRAVENOUS; SUBCUTANEOUS at 17:40

## 2021-01-01 RX ADMIN — DIVALPROEX SODIUM 250 MILLIGRAM(S): 500 TABLET, DELAYED RELEASE ORAL at 05:17

## 2021-01-01 RX ADMIN — ERYTHROPOIETIN 14000 UNIT(S): 10000 INJECTION, SOLUTION INTRAVENOUS; SUBCUTANEOUS at 10:40

## 2021-01-01 RX ADMIN — Medication 50 MILLIGRAM(S): at 22:07

## 2021-01-01 RX ADMIN — QUETIAPINE FUMARATE 25 MILLIGRAM(S): 200 TABLET, FILM COATED ORAL at 05:25

## 2021-01-01 RX ADMIN — HEPARIN SODIUM 5000 UNIT(S): 5000 INJECTION INTRAVENOUS; SUBCUTANEOUS at 06:55

## 2021-01-01 RX ADMIN — Medication 3 MILLILITER(S): at 00:27

## 2021-01-01 RX ADMIN — Medication 2 MILLIGRAM(S): at 22:17

## 2021-01-01 RX ADMIN — Medication 1000 UNIT(S): at 12:38

## 2021-01-01 RX ADMIN — INSULIN GLARGINE 3 UNIT(S): 100 INJECTION, SOLUTION SUBCUTANEOUS at 22:33

## 2021-01-01 RX ADMIN — MIDODRINE HYDROCHLORIDE 10 MILLIGRAM(S): 2.5 TABLET ORAL at 10:14

## 2021-01-01 RX ADMIN — Medication 1: at 07:58

## 2021-01-01 RX ADMIN — SENNA PLUS 10 MILLILITER(S): 8.6 TABLET ORAL at 23:43

## 2021-01-01 RX ADMIN — ATORVASTATIN CALCIUM 80 MILLIGRAM(S): 80 TABLET, FILM COATED ORAL at 21:44

## 2021-01-01 RX ADMIN — Medication 1000 UNIT(S): at 13:03

## 2021-01-01 RX ADMIN — MUPIROCIN 1 APPLICATION(S): 20 OINTMENT TOPICAL at 16:36

## 2021-01-01 RX ADMIN — Medication 2 MILLIGRAM(S): at 22:49

## 2021-01-01 RX ADMIN — MEMANTINE HYDROCHLORIDE 5 MILLIGRAM(S): 10 TABLET ORAL at 22:48

## 2021-01-01 RX ADMIN — CARBIDOPA AND LEVODOPA 2.5 TABLET(S): 25; 100 TABLET ORAL at 05:03

## 2021-01-01 RX ADMIN — QUETIAPINE FUMARATE 12.5 MILLIGRAM(S): 200 TABLET, FILM COATED ORAL at 22:06

## 2021-01-01 RX ADMIN — Medication 50 MICROGRAM(S): at 06:56

## 2021-01-01 RX ADMIN — DIVALPROEX SODIUM 250 MILLIGRAM(S): 500 TABLET, DELAYED RELEASE ORAL at 17:15

## 2021-01-01 RX ADMIN — DULOXETINE HYDROCHLORIDE 20 MILLIGRAM(S): 30 CAPSULE, DELAYED RELEASE ORAL at 13:39

## 2021-01-01 RX ADMIN — MIDODRINE HYDROCHLORIDE 20 MILLIGRAM(S): 2.5 TABLET ORAL at 15:01

## 2021-01-01 RX ADMIN — Medication 2 MILLIGRAM(S): at 21:48

## 2021-01-01 RX ADMIN — CARBIDOPA AND LEVODOPA 2 TABLET(S): 25; 100 TABLET ORAL at 22:55

## 2021-01-01 RX ADMIN — DULOXETINE HYDROCHLORIDE 20 MILLIGRAM(S): 30 CAPSULE, DELAYED RELEASE ORAL at 11:19

## 2021-01-01 RX ADMIN — MIDODRINE HYDROCHLORIDE 20 MILLIGRAM(S): 2.5 TABLET ORAL at 21:50

## 2021-01-01 RX ADMIN — DIVALPROEX SODIUM 125 MILLIGRAM(S): 500 TABLET, DELAYED RELEASE ORAL at 13:59

## 2021-01-01 RX ADMIN — CARBIDOPA AND LEVODOPA 2.5 TABLET(S): 25; 100 TABLET ORAL at 15:01

## 2021-01-01 RX ADMIN — CHLORHEXIDINE GLUCONATE 1 APPLICATION(S): 213 SOLUTION TOPICAL at 05:37

## 2021-01-01 RX ADMIN — Medication 650 MILLIGRAM(S): at 13:52

## 2021-01-01 RX ADMIN — QUETIAPINE FUMARATE 25 MILLIGRAM(S): 200 TABLET, FILM COATED ORAL at 22:24

## 2021-01-01 RX ADMIN — DIVALPROEX SODIUM 250 MILLIGRAM(S): 500 TABLET, DELAYED RELEASE ORAL at 13:09

## 2021-01-01 RX ADMIN — ERYTHROPOIETIN 4000 UNIT(S): 10000 INJECTION, SOLUTION INTRAVENOUS; SUBCUTANEOUS at 23:25

## 2021-01-01 RX ADMIN — QUETIAPINE FUMARATE 25 MILLIGRAM(S): 200 TABLET, FILM COATED ORAL at 13:12

## 2021-01-01 RX ADMIN — DIVALPROEX SODIUM 250 MILLIGRAM(S): 500 TABLET, DELAYED RELEASE ORAL at 06:37

## 2021-01-01 RX ADMIN — CARBIDOPA AND LEVODOPA 2 TABLET(S): 25; 100 TABLET ORAL at 22:24

## 2021-01-01 RX ADMIN — Medication 2 MILLIGRAM(S): at 14:28

## 2021-01-01 RX ADMIN — Medication 1 MILLIGRAM(S): at 10:45

## 2021-01-01 RX ADMIN — DULOXETINE HYDROCHLORIDE 20 MILLIGRAM(S): 30 CAPSULE, DELAYED RELEASE ORAL at 12:38

## 2021-01-01 RX ADMIN — DIVALPROEX SODIUM 250 MILLIGRAM(S): 500 TABLET, DELAYED RELEASE ORAL at 05:38

## 2021-01-01 RX ADMIN — DIVALPROEX SODIUM 250 MILLIGRAM(S): 500 TABLET, DELAYED RELEASE ORAL at 06:00

## 2021-01-01 RX ADMIN — POLYETHYLENE GLYCOL 3350 17 GRAM(S): 17 POWDER, FOR SOLUTION ORAL at 05:53

## 2021-01-01 RX ADMIN — OXYCODONE HYDROCHLORIDE 5 MILLIGRAM(S): 5 TABLET ORAL at 07:15

## 2021-01-01 RX ADMIN — CARBIDOPA AND LEVODOPA 2 TABLET(S): 25; 100 TABLET ORAL at 21:23

## 2021-01-01 RX ADMIN — Medication 2 MILLIGRAM(S): at 12:04

## 2021-01-01 RX ADMIN — MIDODRINE HYDROCHLORIDE 10 MILLIGRAM(S): 2.5 TABLET ORAL at 12:54

## 2021-01-01 RX ADMIN — Medication 2 MILLIGRAM(S): at 05:41

## 2021-01-01 RX ADMIN — SEVELAMER CARBONATE 1600 MILLIGRAM(S): 2400 POWDER, FOR SUSPENSION ORAL at 22:25

## 2021-01-01 RX ADMIN — Medication 1 TABLET(S): at 13:45

## 2021-01-01 RX ADMIN — Medication 3 MILLILITER(S): at 05:33

## 2021-01-01 RX ADMIN — Medication 1: at 12:57

## 2021-01-01 RX ADMIN — Medication 1000 UNIT(S): at 12:27

## 2021-01-01 RX ADMIN — Medication 1 MILLIGRAM(S): at 12:25

## 2021-01-01 RX ADMIN — MUPIROCIN 1 APPLICATION(S): 20 OINTMENT TOPICAL at 05:38

## 2021-01-01 RX ADMIN — CARBIDOPA AND LEVODOPA 2 TABLET(S): 25; 100 TABLET ORAL at 21:44

## 2021-01-01 RX ADMIN — QUETIAPINE FUMARATE 12.5 MILLIGRAM(S): 200 TABLET, FILM COATED ORAL at 10:24

## 2021-01-01 RX ADMIN — CARBIDOPA AND LEVODOPA 2 TABLET(S): 25; 100 TABLET ORAL at 21:50

## 2021-01-01 RX ADMIN — Medication 1000 UNIT(S): at 13:49

## 2021-01-01 RX ADMIN — Medication 2: at 17:38

## 2021-01-01 RX ADMIN — ATORVASTATIN CALCIUM 80 MILLIGRAM(S): 80 TABLET, FILM COATED ORAL at 22:29

## 2021-01-01 RX ADMIN — Medication 1: at 09:27

## 2021-01-01 RX ADMIN — QUETIAPINE FUMARATE 25 MILLIGRAM(S): 200 TABLET, FILM COATED ORAL at 05:42

## 2021-01-01 RX ADMIN — Medication 1 TABLET(S): at 11:57

## 2021-01-01 RX ADMIN — ATORVASTATIN CALCIUM 80 MILLIGRAM(S): 80 TABLET, FILM COATED ORAL at 22:05

## 2021-01-01 RX ADMIN — DIVALPROEX SODIUM 250 MILLIGRAM(S): 500 TABLET, DELAYED RELEASE ORAL at 18:39

## 2021-01-01 RX ADMIN — POLYETHYLENE GLYCOL 3350 17 GRAM(S): 17 POWDER, FOR SOLUTION ORAL at 05:51

## 2021-01-01 RX ADMIN — Medication 1000 UNIT(S): at 11:36

## 2021-01-01 RX ADMIN — Medication 2 MILLIGRAM(S): at 06:20

## 2021-01-01 RX ADMIN — Medication 2 MILLIGRAM(S): at 05:28

## 2021-01-01 RX ADMIN — ATORVASTATIN CALCIUM 80 MILLIGRAM(S): 80 TABLET, FILM COATED ORAL at 21:06

## 2021-01-01 RX ADMIN — Medication 2 MILLIGRAM(S): at 22:12

## 2021-01-01 RX ADMIN — Medication 25 MILLIGRAM(S): at 05:52

## 2021-01-01 RX ADMIN — INSULIN GLARGINE 8 UNIT(S): 100 INJECTION, SOLUTION SUBCUTANEOUS at 22:54

## 2021-01-01 RX ADMIN — MIDODRINE HYDROCHLORIDE 10 MILLIGRAM(S): 2.5 TABLET ORAL at 18:29

## 2021-01-01 RX ADMIN — Medication 1: at 17:48

## 2021-01-01 RX ADMIN — QUETIAPINE FUMARATE 12.5 MILLIGRAM(S): 200 TABLET, FILM COATED ORAL at 22:42

## 2021-01-01 RX ADMIN — CARBIDOPA AND LEVODOPA 2 TABLET(S): 25; 100 TABLET ORAL at 22:33

## 2021-01-01 RX ADMIN — Medication 50 MILLIGRAM(S): at 05:47

## 2021-01-01 RX ADMIN — SEVELAMER CARBONATE 1600 MILLIGRAM(S): 2400 POWDER, FOR SUSPENSION ORAL at 11:41

## 2021-01-01 RX ADMIN — OXYCODONE HYDROCHLORIDE 5 MILLIGRAM(S): 5 TABLET ORAL at 03:49

## 2021-01-01 RX ADMIN — Medication 12.5 GRAM(S): at 08:09

## 2021-01-01 RX ADMIN — ENOXAPARIN SODIUM 100 MILLIGRAM(S): 100 INJECTION SUBCUTANEOUS at 20:05

## 2021-01-01 RX ADMIN — Medication 25 MILLIGRAM(S): at 06:53

## 2021-01-01 RX ADMIN — Medication 3 UNIT(S): at 17:37

## 2021-01-01 RX ADMIN — Medication 2: at 12:34

## 2021-01-01 RX ADMIN — CARBIDOPA AND LEVODOPA 2 TABLET(S): 25; 100 TABLET ORAL at 22:00

## 2021-01-01 RX ADMIN — Medication 2 MILLIGRAM(S): at 22:02

## 2021-01-01 RX ADMIN — Medication 1 MILLIGRAM(S): at 12:23

## 2021-01-01 RX ADMIN — Medication 3 MILLILITER(S): at 17:08

## 2021-01-01 RX ADMIN — Medication 1 TABLET(S): at 12:15

## 2021-01-01 RX ADMIN — Medication 650 MILLIGRAM(S): at 05:44

## 2021-01-01 RX ADMIN — Medication 1: at 17:12

## 2021-01-01 RX ADMIN — CARBIDOPA AND LEVODOPA 2.5 TABLET(S): 25; 100 TABLET ORAL at 13:21

## 2021-01-01 RX ADMIN — Medication 1 TABLET(S): at 11:01

## 2021-01-01 RX ADMIN — Medication 2 MILLIGRAM(S): at 22:13

## 2021-01-01 RX ADMIN — CHLORHEXIDINE GLUCONATE 1 APPLICATION(S): 213 SOLUTION TOPICAL at 05:32

## 2021-01-01 RX ADMIN — CHLORHEXIDINE GLUCONATE 1 APPLICATION(S): 213 SOLUTION TOPICAL at 12:44

## 2021-01-01 RX ADMIN — INSULIN GLARGINE 8 UNIT(S): 100 INJECTION, SOLUTION SUBCUTANEOUS at 21:32

## 2021-01-01 RX ADMIN — Medication 25 MICROGRAM(S): at 05:26

## 2021-01-01 RX ADMIN — ATORVASTATIN CALCIUM 80 MILLIGRAM(S): 80 TABLET, FILM COATED ORAL at 22:02

## 2021-01-01 RX ADMIN — Medication 667 MILLIGRAM(S): at 17:04

## 2021-01-01 RX ADMIN — Medication 50 MILLIGRAM(S): at 18:36

## 2021-01-01 RX ADMIN — Medication 1 MILLIGRAM(S): at 13:02

## 2021-01-01 RX ADMIN — CARBIDOPA AND LEVODOPA 2.5 TABLET(S): 25; 100 TABLET ORAL at 13:23

## 2021-01-01 RX ADMIN — Medication 2 MILLIGRAM(S): at 05:59

## 2021-01-01 RX ADMIN — CARBIDOPA AND LEVODOPA 2.5 TABLET(S): 25; 100 TABLET ORAL at 13:48

## 2021-01-01 RX ADMIN — CARBIDOPA AND LEVODOPA 2.5 TABLET(S): 25; 100 TABLET ORAL at 13:20

## 2021-01-01 RX ADMIN — POLYETHYLENE GLYCOL 3350 17 GRAM(S): 17 POWDER, FOR SOLUTION ORAL at 05:37

## 2021-01-01 RX ADMIN — Medication 25 MILLIGRAM(S): at 06:09

## 2021-01-01 RX ADMIN — CARBIDOPA AND LEVODOPA 2.5 TABLET(S): 25; 100 TABLET ORAL at 13:08

## 2021-01-01 RX ADMIN — SENNA PLUS 10 MILLILITER(S): 8.6 TABLET ORAL at 22:14

## 2021-01-01 RX ADMIN — Medication 2 MILLIGRAM(S): at 05:37

## 2021-01-01 RX ADMIN — Medication 1: at 13:04

## 2021-01-01 RX ADMIN — Medication 105 MILLIGRAM(S): at 17:39

## 2021-01-01 RX ADMIN — CHLORHEXIDINE GLUCONATE 1 APPLICATION(S): 213 SOLUTION TOPICAL at 05:45

## 2021-01-01 RX ADMIN — OXYCODONE HYDROCHLORIDE 5 MILLIGRAM(S): 5 TABLET ORAL at 09:32

## 2021-01-01 RX ADMIN — Medication 2 MILLIGRAM(S): at 13:08

## 2021-01-01 RX ADMIN — MEMANTINE HYDROCHLORIDE 5 MILLIGRAM(S): 10 TABLET ORAL at 22:24

## 2021-01-01 RX ADMIN — DIVALPROEX SODIUM 250 MILLIGRAM(S): 500 TABLET, DELAYED RELEASE ORAL at 06:08

## 2021-01-01 RX ADMIN — Medication 40 MILLIGRAM(S): at 15:16

## 2021-01-01 RX ADMIN — Medication 50 MICROGRAM(S): at 05:35

## 2021-01-01 RX ADMIN — CARBIDOPA AND LEVODOPA 2 TABLET(S): 25; 100 TABLET ORAL at 23:26

## 2021-01-01 RX ADMIN — Medication 0: at 08:52

## 2021-01-01 RX ADMIN — POLYETHYLENE GLYCOL 3350 17 GRAM(S): 17 POWDER, FOR SOLUTION ORAL at 06:36

## 2021-01-01 RX ADMIN — Medication 2 MILLIGRAM(S): at 12:31

## 2021-01-01 RX ADMIN — Medication 1: at 12:06

## 2021-01-01 RX ADMIN — Medication 25 MILLIGRAM(S): at 18:22

## 2021-01-01 RX ADMIN — QUETIAPINE FUMARATE 12.5 MILLIGRAM(S): 200 TABLET, FILM COATED ORAL at 11:54

## 2021-01-01 RX ADMIN — QUETIAPINE FUMARATE 12.5 MILLIGRAM(S): 200 TABLET, FILM COATED ORAL at 06:53

## 2021-01-01 RX ADMIN — Medication 50 MILLIGRAM(S): at 05:02

## 2021-01-01 RX ADMIN — CARBIDOPA AND LEVODOPA 2.5 TABLET(S): 25; 100 TABLET ORAL at 13:59

## 2021-01-01 RX ADMIN — CHLORHEXIDINE GLUCONATE 1 APPLICATION(S): 213 SOLUTION TOPICAL at 09:16

## 2021-01-01 RX ADMIN — CARBIDOPA AND LEVODOPA 2.5 TABLET(S): 25; 100 TABLET ORAL at 14:10

## 2021-01-01 RX ADMIN — MIDODRINE HYDROCHLORIDE 10 MILLIGRAM(S): 2.5 TABLET ORAL at 12:28

## 2021-01-01 RX ADMIN — Medication 1000 UNIT(S): at 11:01

## 2021-01-01 RX ADMIN — Medication 60 MILLIGRAM(S): at 05:10

## 2021-01-01 RX ADMIN — Medication 2 MILLIGRAM(S): at 06:38

## 2021-01-01 RX ADMIN — QUETIAPINE FUMARATE 12.5 MILLIGRAM(S): 200 TABLET, FILM COATED ORAL at 22:12

## 2021-01-01 RX ADMIN — Medication 650 MILLIGRAM(S): at 22:26

## 2021-01-01 RX ADMIN — Medication 2 MILLIGRAM(S): at 22:40

## 2021-01-01 RX ADMIN — HEPARIN SODIUM 0 UNIT(S)/HR: 5000 INJECTION INTRAVENOUS; SUBCUTANEOUS at 02:41

## 2021-01-01 RX ADMIN — DIVALPROEX SODIUM 250 MILLIGRAM(S): 500 TABLET, DELAYED RELEASE ORAL at 06:21

## 2021-01-01 RX ADMIN — Medication 1 MILLIGRAM(S): at 12:00

## 2021-01-01 RX ADMIN — Medication 25 MILLIGRAM(S): at 19:04

## 2021-01-01 RX ADMIN — Medication 1: at 23:21

## 2021-01-01 RX ADMIN — QUETIAPINE FUMARATE 12.5 MILLIGRAM(S): 200 TABLET, FILM COATED ORAL at 06:52

## 2021-01-01 RX ADMIN — Medication 50 MILLIGRAM(S): at 17:43

## 2021-01-01 RX ADMIN — QUETIAPINE FUMARATE 12.5 MILLIGRAM(S): 200 TABLET, FILM COATED ORAL at 08:45

## 2021-01-01 RX ADMIN — Medication 975 MILLIGRAM(S): at 10:50

## 2021-01-01 RX ADMIN — ATORVASTATIN CALCIUM 80 MILLIGRAM(S): 80 TABLET, FILM COATED ORAL at 21:23

## 2021-01-01 RX ADMIN — POLYETHYLENE GLYCOL 3350 17 GRAM(S): 17 POWDER, FOR SOLUTION ORAL at 17:19

## 2021-01-01 RX ADMIN — Medication 25 MILLIGRAM(S): at 06:29

## 2021-01-01 RX ADMIN — INSULIN GLARGINE 8 UNIT(S): 100 INJECTION, SOLUTION SUBCUTANEOUS at 22:13

## 2021-01-01 RX ADMIN — MEMANTINE HYDROCHLORIDE 5 MILLIGRAM(S): 10 TABLET ORAL at 23:22

## 2021-01-01 RX ADMIN — MEMANTINE HYDROCHLORIDE 5 MILLIGRAM(S): 10 TABLET ORAL at 23:28

## 2021-01-01 RX ADMIN — Medication 1000 UNIT(S): at 12:23

## 2021-01-01 RX ADMIN — Medication 2: at 08:00

## 2021-01-01 RX ADMIN — SENNA PLUS 10 MILLILITER(S): 8.6 TABLET ORAL at 22:24

## 2021-01-01 RX ADMIN — DIVALPROEX SODIUM 250 MILLIGRAM(S): 500 TABLET, DELAYED RELEASE ORAL at 13:22

## 2021-01-01 RX ADMIN — DIVALPROEX SODIUM 250 MILLIGRAM(S): 500 TABLET, DELAYED RELEASE ORAL at 13:14

## 2021-01-01 RX ADMIN — PIPERACILLIN AND TAZOBACTAM 25 GRAM(S): 4; .5 INJECTION, POWDER, LYOPHILIZED, FOR SOLUTION INTRAVENOUS at 18:17

## 2021-01-01 RX ADMIN — CARBIDOPA AND LEVODOPA 2 TABLET(S): 25; 100 TABLET ORAL at 22:05

## 2021-01-01 RX ADMIN — Medication 2 MILLIGRAM(S): at 22:28

## 2021-01-01 RX ADMIN — DIVALPROEX SODIUM 250 MILLIGRAM(S): 500 TABLET, DELAYED RELEASE ORAL at 18:45

## 2021-01-01 RX ADMIN — Medication 40 MILLIGRAM(S): at 14:38

## 2021-01-01 RX ADMIN — Medication 2 MILLIGRAM(S): at 06:02

## 2021-01-01 RX ADMIN — Medication 650 MILLIGRAM(S): at 09:28

## 2021-01-01 RX ADMIN — CHLORHEXIDINE GLUCONATE 1 APPLICATION(S): 213 SOLUTION TOPICAL at 06:17

## 2021-01-01 RX ADMIN — ERYTHROPOIETIN 10000 UNIT(S): 10000 INJECTION, SOLUTION INTRAVENOUS; SUBCUTANEOUS at 17:50

## 2021-01-01 RX ADMIN — Medication 50 MICROGRAM(S): at 06:22

## 2021-01-01 RX ADMIN — INSULIN GLARGINE 6 UNIT(S): 100 INJECTION, SOLUTION SUBCUTANEOUS at 21:45

## 2021-01-01 RX ADMIN — PANTOPRAZOLE SODIUM 40 MILLIGRAM(S): 20 TABLET, DELAYED RELEASE ORAL at 11:18

## 2021-01-01 RX ADMIN — INSULIN GLARGINE 6 UNIT(S): 100 INJECTION, SOLUTION SUBCUTANEOUS at 22:51

## 2021-01-01 RX ADMIN — CARBIDOPA AND LEVODOPA 2.5 TABLET(S): 25; 100 TABLET ORAL at 05:55

## 2021-01-01 RX ADMIN — Medication 650 MILLIGRAM(S): at 13:20

## 2021-01-01 RX ADMIN — Medication 1 MILLIGRAM(S): at 11:14

## 2021-01-01 RX ADMIN — Medication 50 MILLIGRAM(S): at 05:39

## 2021-01-01 RX ADMIN — MEMANTINE HYDROCHLORIDE 5 MILLIGRAM(S): 10 TABLET ORAL at 21:40

## 2021-01-01 RX ADMIN — CARBIDOPA AND LEVODOPA 2.5 TABLET(S): 25; 100 TABLET ORAL at 06:31

## 2021-01-01 RX ADMIN — Medication 650 MILLIGRAM(S): at 15:17

## 2021-01-01 RX ADMIN — CARBIDOPA AND LEVODOPA 2 TABLET(S): 25; 100 TABLET ORAL at 21:18

## 2021-01-01 RX ADMIN — QUETIAPINE FUMARATE 25 MILLIGRAM(S): 200 TABLET, FILM COATED ORAL at 22:46

## 2021-01-01 RX ADMIN — QUETIAPINE FUMARATE 12.5 MILLIGRAM(S): 200 TABLET, FILM COATED ORAL at 11:59

## 2021-01-01 RX ADMIN — Medication 2 MILLIGRAM(S): at 13:41

## 2021-01-01 RX ADMIN — ATORVASTATIN CALCIUM 80 MILLIGRAM(S): 80 TABLET, FILM COATED ORAL at 21:18

## 2021-01-01 RX ADMIN — QUETIAPINE FUMARATE 25 MILLIGRAM(S): 200 TABLET, FILM COATED ORAL at 13:22

## 2021-01-01 RX ADMIN — ERYTHROPOIETIN 10000 UNIT(S): 10000 INJECTION, SOLUTION INTRAVENOUS; SUBCUTANEOUS at 20:47

## 2021-01-01 RX ADMIN — Medication 1 MILLIGRAM(S): at 09:29

## 2021-01-01 RX ADMIN — Medication 2 MILLIGRAM(S): at 22:57

## 2021-01-01 RX ADMIN — DIVALPROEX SODIUM 250 MILLIGRAM(S): 500 TABLET, DELAYED RELEASE ORAL at 21:17

## 2021-01-01 RX ADMIN — Medication 500 MILLIGRAM(S): at 13:13

## 2021-01-01 RX ADMIN — Medication 650 MILLIGRAM(S): at 02:53

## 2021-01-01 RX ADMIN — MUPIROCIN 1 APPLICATION(S): 20 OINTMENT TOPICAL at 05:35

## 2021-01-01 RX ADMIN — Medication 25 MICROGRAM(S): at 05:45

## 2021-01-01 RX ADMIN — Medication 2: at 08:46

## 2021-01-01 RX ADMIN — DIVALPROEX SODIUM 250 MILLIGRAM(S): 500 TABLET, DELAYED RELEASE ORAL at 06:49

## 2021-01-01 RX ADMIN — Medication 50 MILLIGRAM(S): at 10:46

## 2021-01-01 RX ADMIN — Medication 3 MILLILITER(S): at 12:14

## 2021-01-01 RX ADMIN — SENNA PLUS 10 MILLILITER(S): 8.6 TABLET ORAL at 21:46

## 2021-01-01 RX ADMIN — Medication 1 MILLIGRAM(S): at 12:56

## 2021-01-01 RX ADMIN — Medication 650 MILLIGRAM(S): at 11:42

## 2021-01-01 RX ADMIN — Medication 1 MILLIGRAM(S): at 13:58

## 2021-01-01 RX ADMIN — Medication 2: at 11:47

## 2021-01-01 RX ADMIN — Medication 500 MILLIGRAM(S): at 12:00

## 2021-01-01 RX ADMIN — MUPIROCIN 1 APPLICATION(S): 20 OINTMENT TOPICAL at 05:33

## 2021-01-01 RX ADMIN — Medication 25 MILLIGRAM(S): at 05:12

## 2021-01-01 RX ADMIN — CARBIDOPA AND LEVODOPA 2 TABLET(S): 25; 100 TABLET ORAL at 21:57

## 2021-01-01 RX ADMIN — Medication 650 MILLIGRAM(S): at 20:32

## 2021-01-01 RX ADMIN — Medication 1000 UNIT(S): at 11:25

## 2021-01-01 RX ADMIN — CHLORHEXIDINE GLUCONATE 1 APPLICATION(S): 213 SOLUTION TOPICAL at 06:50

## 2021-01-01 RX ADMIN — Medication 650 MILLIGRAM(S): at 20:00

## 2021-01-01 RX ADMIN — Medication 25 MILLIGRAM(S): at 06:49

## 2021-01-01 RX ADMIN — Medication 3 UNIT(S): at 11:18

## 2021-01-01 RX ADMIN — Medication 25 MILLIGRAM(S): at 05:14

## 2021-01-01 RX ADMIN — Medication 1000 UNIT(S): at 12:51

## 2021-01-01 RX ADMIN — QUETIAPINE FUMARATE 25 MILLIGRAM(S): 200 TABLET, FILM COATED ORAL at 21:13

## 2021-01-01 RX ADMIN — HYDROMORPHONE HYDROCHLORIDE 0.5 MILLIGRAM(S): 2 INJECTION INTRAMUSCULAR; INTRAVENOUS; SUBCUTANEOUS at 09:04

## 2021-01-01 RX ADMIN — Medication 1000 UNIT(S): at 11:48

## 2021-01-01 RX ADMIN — Medication 50 MILLIGRAM(S): at 05:23

## 2021-01-01 RX ADMIN — Medication 2 MILLIGRAM(S): at 05:55

## 2021-01-01 RX ADMIN — SENNA PLUS 10 MILLILITER(S): 8.6 TABLET ORAL at 22:10

## 2021-01-01 RX ADMIN — POLYETHYLENE GLYCOL 3350 17 GRAM(S): 17 POWDER, FOR SOLUTION ORAL at 18:48

## 2021-01-01 RX ADMIN — Medication 650 MILLIGRAM(S): at 12:29

## 2021-01-01 RX ADMIN — Medication 650 MILLIGRAM(S): at 22:10

## 2021-01-01 RX ADMIN — DULOXETINE HYDROCHLORIDE 20 MILLIGRAM(S): 30 CAPSULE, DELAYED RELEASE ORAL at 13:58

## 2021-01-01 RX ADMIN — Medication 25 MILLIGRAM(S): at 17:07

## 2021-01-01 RX ADMIN — CHLORHEXIDINE GLUCONATE 1 APPLICATION(S): 213 SOLUTION TOPICAL at 05:55

## 2021-01-01 RX ADMIN — DIVALPROEX SODIUM 250 MILLIGRAM(S): 500 TABLET, DELAYED RELEASE ORAL at 17:27

## 2021-01-01 RX ADMIN — CHLORHEXIDINE GLUCONATE 1 APPLICATION(S): 213 SOLUTION TOPICAL at 05:40

## 2021-01-01 RX ADMIN — CHLORHEXIDINE GLUCONATE 1 APPLICATION(S): 213 SOLUTION TOPICAL at 05:29

## 2021-01-01 RX ADMIN — Medication 1: at 08:02

## 2021-01-01 RX ADMIN — QUETIAPINE FUMARATE 12.5 MILLIGRAM(S): 200 TABLET, FILM COATED ORAL at 06:40

## 2021-01-01 RX ADMIN — ATORVASTATIN CALCIUM 80 MILLIGRAM(S): 80 TABLET, FILM COATED ORAL at 21:33

## 2021-01-01 RX ADMIN — Medication 1 MILLIGRAM(S): at 11:43

## 2021-01-01 RX ADMIN — Medication 25 MILLIGRAM(S): at 05:37

## 2021-01-01 RX ADMIN — DIVALPROEX SODIUM 125 MILLIGRAM(S): 500 TABLET, DELAYED RELEASE ORAL at 22:01

## 2021-01-01 RX ADMIN — CARBIDOPA AND LEVODOPA 2.5 TABLET(S): 25; 100 TABLET ORAL at 13:54

## 2021-01-01 RX ADMIN — Medication 2 MILLIGRAM(S): at 14:19

## 2021-01-01 RX ADMIN — Medication 50 MILLIGRAM(S): at 22:24

## 2021-01-01 RX ADMIN — Medication 1: at 18:17

## 2021-01-01 RX ADMIN — Medication 1 MILLIGRAM(S): at 12:52

## 2021-01-01 RX ADMIN — QUETIAPINE FUMARATE 12.5 MILLIGRAM(S): 200 TABLET, FILM COATED ORAL at 05:29

## 2021-01-01 RX ADMIN — Medication 2 MILLIGRAM(S): at 22:41

## 2021-01-01 RX ADMIN — DIVALPROEX SODIUM 250 MILLIGRAM(S): 500 TABLET, DELAYED RELEASE ORAL at 13:44

## 2021-01-01 RX ADMIN — Medication 650 MILLIGRAM(S): at 03:06

## 2021-01-01 RX ADMIN — Medication 0.5 MILLIGRAM(S): at 02:36

## 2021-01-01 RX ADMIN — MEMANTINE HYDROCHLORIDE 5 MILLIGRAM(S): 10 TABLET ORAL at 06:19

## 2021-01-01 RX ADMIN — Medication 3 MILLILITER(S): at 11:30

## 2021-01-01 RX ADMIN — CARBIDOPA AND LEVODOPA 2.5 TABLET(S): 25; 100 TABLET ORAL at 06:07

## 2021-01-01 RX ADMIN — QUETIAPINE FUMARATE 12.5 MILLIGRAM(S): 200 TABLET, FILM COATED ORAL at 05:15

## 2021-01-01 RX ADMIN — CARBIDOPA AND LEVODOPA 2 TABLET(S): 25; 100 TABLET ORAL at 22:51

## 2021-01-01 RX ADMIN — Medication 500 MILLIGRAM(S): at 15:46

## 2021-01-01 RX ADMIN — POLYETHYLENE GLYCOL 3350 17 GRAM(S): 17 POWDER, FOR SOLUTION ORAL at 06:43

## 2021-01-01 RX ADMIN — Medication 2 MILLIGRAM(S): at 12:50

## 2021-01-01 RX ADMIN — HEPARIN SODIUM 0 UNIT(S)/HR: 5000 INJECTION INTRAVENOUS; SUBCUTANEOUS at 10:58

## 2021-01-01 RX ADMIN — CARBIDOPA AND LEVODOPA 2.5 TABLET(S): 25; 100 TABLET ORAL at 08:42

## 2021-01-01 RX ADMIN — Medication 2 MILLIGRAM(S): at 06:24

## 2021-01-01 RX ADMIN — QUETIAPINE FUMARATE 12.5 MILLIGRAM(S): 200 TABLET, FILM COATED ORAL at 22:25

## 2021-01-01 RX ADMIN — Medication 1 TABLET(S): at 13:41

## 2021-01-01 RX ADMIN — CARBIDOPA AND LEVODOPA 2 TABLET(S): 25; 100 TABLET ORAL at 22:27

## 2021-01-01 RX ADMIN — Medication 1 MILLIGRAM(S): at 11:52

## 2021-01-01 RX ADMIN — QUETIAPINE FUMARATE 12.5 MILLIGRAM(S): 200 TABLET, FILM COATED ORAL at 19:04

## 2021-01-01 RX ADMIN — Medication 2 MILLIGRAM(S): at 22:22

## 2021-01-01 RX ADMIN — CARBIDOPA AND LEVODOPA 2.5 TABLET(S): 25; 100 TABLET ORAL at 12:50

## 2021-01-01 RX ADMIN — POLYETHYLENE GLYCOL 3350 17 GRAM(S): 17 POWDER, FOR SOLUTION ORAL at 05:12

## 2021-01-01 RX ADMIN — QUETIAPINE FUMARATE 25 MILLIGRAM(S): 200 TABLET, FILM COATED ORAL at 06:29

## 2021-01-01 RX ADMIN — INSULIN GLARGINE 6 UNIT(S): 100 INJECTION, SOLUTION SUBCUTANEOUS at 22:25

## 2021-01-01 RX ADMIN — Medication 1000 UNIT(S): at 13:56

## 2021-01-01 RX ADMIN — QUETIAPINE FUMARATE 25 MILLIGRAM(S): 200 TABLET, FILM COATED ORAL at 02:18

## 2021-01-01 RX ADMIN — CARBIDOPA AND LEVODOPA 2 TABLET(S): 25; 100 TABLET ORAL at 23:21

## 2021-01-01 RX ADMIN — QUETIAPINE FUMARATE 25 MILLIGRAM(S): 200 TABLET, FILM COATED ORAL at 22:02

## 2021-01-01 RX ADMIN — ATORVASTATIN CALCIUM 80 MILLIGRAM(S): 80 TABLET, FILM COATED ORAL at 22:52

## 2021-01-01 RX ADMIN — Medication 1 TABLET(S): at 12:50

## 2021-01-01 RX ADMIN — QUETIAPINE FUMARATE 12.5 MILLIGRAM(S): 200 TABLET, FILM COATED ORAL at 13:09

## 2021-01-01 RX ADMIN — Medication 1000 UNIT(S): at 11:52

## 2021-01-01 RX ADMIN — Medication 1000 UNIT(S): at 13:22

## 2021-01-01 RX ADMIN — ERYTHROPOIETIN 14000 UNIT(S): 10000 INJECTION, SOLUTION INTRAVENOUS; SUBCUTANEOUS at 16:52

## 2021-01-01 RX ADMIN — Medication 25 MILLIGRAM(S): at 18:39

## 2021-01-01 RX ADMIN — Medication 2 MILLIGRAM(S): at 14:13

## 2021-01-01 RX ADMIN — OXYCODONE HYDROCHLORIDE 5 MILLIGRAM(S): 5 TABLET ORAL at 05:48

## 2021-01-01 RX ADMIN — Medication 650 MILLIGRAM(S): at 22:14

## 2021-01-01 RX ADMIN — ATORVASTATIN CALCIUM 80 MILLIGRAM(S): 80 TABLET, FILM COATED ORAL at 21:15

## 2021-01-01 RX ADMIN — Medication 1 TABLET(S): at 13:51

## 2021-01-01 RX ADMIN — Medication 667 MILLIGRAM(S): at 11:44

## 2021-01-01 RX ADMIN — DIVALPROEX SODIUM 250 MILLIGRAM(S): 500 TABLET, DELAYED RELEASE ORAL at 18:08

## 2021-01-01 RX ADMIN — QUETIAPINE FUMARATE 25 MILLIGRAM(S): 200 TABLET, FILM COATED ORAL at 06:19

## 2021-01-01 RX ADMIN — DIVALPROEX SODIUM 250 MILLIGRAM(S): 500 TABLET, DELAYED RELEASE ORAL at 06:31

## 2021-01-01 RX ADMIN — CARBIDOPA AND LEVODOPA 2 TABLET(S): 25; 100 TABLET ORAL at 22:21

## 2021-01-01 RX ADMIN — Medication 50 MICROGRAM(S): at 06:30

## 2021-01-01 RX ADMIN — Medication 1000 UNIT(S): at 11:59

## 2021-01-01 RX ADMIN — Medication 1 MILLIGRAM(S): at 13:21

## 2021-01-01 RX ADMIN — POLYETHYLENE GLYCOL 3350 17 GRAM(S): 17 POWDER, FOR SOLUTION ORAL at 05:36

## 2021-01-01 RX ADMIN — OXYCODONE HYDROCHLORIDE 5 MILLIGRAM(S): 5 TABLET ORAL at 06:22

## 2021-01-01 RX ADMIN — DIVALPROEX SODIUM 250 MILLIGRAM(S): 500 TABLET, DELAYED RELEASE ORAL at 06:38

## 2021-01-01 RX ADMIN — Medication 1000 UNIT(S): at 13:04

## 2021-01-01 RX ADMIN — CARBIDOPA AND LEVODOPA 2 TABLET(S): 25; 100 TABLET ORAL at 05:32

## 2021-01-01 RX ADMIN — DULOXETINE HYDROCHLORIDE 20 MILLIGRAM(S): 30 CAPSULE, DELAYED RELEASE ORAL at 12:53

## 2021-01-01 RX ADMIN — Medication 25 MILLIGRAM(S): at 05:45

## 2021-01-01 RX ADMIN — QUETIAPINE FUMARATE 25 MILLIGRAM(S): 200 TABLET, FILM COATED ORAL at 05:34

## 2021-01-01 RX ADMIN — SENNA PLUS 10 MILLILITER(S): 8.6 TABLET ORAL at 22:52

## 2021-01-01 RX ADMIN — POLYETHYLENE GLYCOL 3350 17 GRAM(S): 17 POWDER, FOR SOLUTION ORAL at 17:14

## 2021-01-01 RX ADMIN — QUETIAPINE FUMARATE 25 MILLIGRAM(S): 200 TABLET, FILM COATED ORAL at 21:54

## 2021-01-01 RX ADMIN — HEPARIN SODIUM 5000 UNIT(S): 5000 INJECTION INTRAVENOUS; SUBCUTANEOUS at 17:15

## 2021-01-01 RX ADMIN — Medication 9.04 MICROGRAM(S)/KG/MIN: at 17:15

## 2021-01-01 RX ADMIN — Medication 1 MILLIGRAM(S): at 08:03

## 2021-01-01 RX ADMIN — HEPARIN SODIUM 0 UNIT(S)/HR: 5000 INJECTION INTRAVENOUS; SUBCUTANEOUS at 19:02

## 2021-01-01 RX ADMIN — PANTOPRAZOLE SODIUM 40 MILLIGRAM(S): 20 TABLET, DELAYED RELEASE ORAL at 11:25

## 2021-01-01 RX ADMIN — Medication 1 TABLET(S): at 13:47

## 2021-01-01 RX ADMIN — CHLORHEXIDINE GLUCONATE 1 APPLICATION(S): 213 SOLUTION TOPICAL at 11:19

## 2021-01-01 RX ADMIN — INSULIN GLARGINE 8 UNIT(S): 100 INJECTION, SOLUTION SUBCUTANEOUS at 21:13

## 2021-01-01 RX ADMIN — PIPERACILLIN AND TAZOBACTAM 200 GRAM(S): 4; .5 INJECTION, POWDER, LYOPHILIZED, FOR SOLUTION INTRAVENOUS at 11:17

## 2021-01-01 RX ADMIN — SENNA PLUS 10 MILLILITER(S): 8.6 TABLET ORAL at 21:53

## 2021-01-01 RX ADMIN — CARBIDOPA AND LEVODOPA 2 TABLET(S): 25; 100 TABLET ORAL at 23:15

## 2021-01-01 RX ADMIN — CARBIDOPA AND LEVODOPA 2.5 TABLET(S): 25; 100 TABLET ORAL at 05:58

## 2021-01-01 RX ADMIN — Medication 2 MILLIGRAM(S): at 14:05

## 2021-01-01 RX ADMIN — Medication 1 TABLET(S): at 12:00

## 2021-01-01 RX ADMIN — Medication 50 MILLIGRAM(S): at 21:15

## 2021-01-01 RX ADMIN — Medication 2 MILLIGRAM(S): at 22:47

## 2021-01-01 RX ADMIN — Medication 2 MILLIGRAM(S): at 22:14

## 2021-01-01 RX ADMIN — Medication 2 MILLIGRAM(S): at 16:01

## 2021-01-01 RX ADMIN — Medication 25 MICROGRAM(S): at 06:02

## 2021-01-01 RX ADMIN — Medication 2 MILLIGRAM(S): at 06:21

## 2021-01-01 RX ADMIN — SEVELAMER CARBONATE 1600 MILLIGRAM(S): 2400 POWDER, FOR SUSPENSION ORAL at 06:55

## 2021-01-01 RX ADMIN — Medication 650 MILLIGRAM(S): at 12:36

## 2021-01-01 RX ADMIN — Medication 1: at 07:55

## 2021-01-01 RX ADMIN — Medication 25 MILLIGRAM(S): at 18:30

## 2021-01-01 RX ADMIN — Medication 25 MILLIGRAM(S): at 22:07

## 2021-01-01 RX ADMIN — Medication 40 MILLIGRAM(S): at 03:51

## 2021-01-01 RX ADMIN — Medication 2 MILLIGRAM(S): at 23:22

## 2021-01-01 RX ADMIN — QUETIAPINE FUMARATE 25 MILLIGRAM(S): 200 TABLET, FILM COATED ORAL at 05:59

## 2021-01-01 RX ADMIN — CARBIDOPA AND LEVODOPA 2.5 TABLET(S): 25; 100 TABLET ORAL at 05:08

## 2021-01-01 RX ADMIN — QUETIAPINE FUMARATE 25 MILLIGRAM(S): 200 TABLET, FILM COATED ORAL at 13:52

## 2021-01-01 RX ADMIN — Medication 2 MILLIGRAM(S): at 06:39

## 2021-01-01 RX ADMIN — CARBIDOPA AND LEVODOPA 2 TABLET(S): 25; 100 TABLET ORAL at 05:23

## 2021-01-01 RX ADMIN — Medication 50 MILLIGRAM(S): at 05:54

## 2021-01-01 RX ADMIN — CARBIDOPA AND LEVODOPA 2 TABLET(S): 25; 100 TABLET ORAL at 22:30

## 2021-01-01 RX ADMIN — Medication 25 MICROGRAM(S): at 06:34

## 2021-01-01 RX ADMIN — Medication 650 MILLIGRAM(S): at 08:09

## 2021-01-01 RX ADMIN — CHLORHEXIDINE GLUCONATE 1 APPLICATION(S): 213 SOLUTION TOPICAL at 10:37

## 2021-01-01 RX ADMIN — Medication 500 MILLIGRAM(S): at 17:04

## 2021-01-01 RX ADMIN — DIVALPROEX SODIUM 250 MILLIGRAM(S): 500 TABLET, DELAYED RELEASE ORAL at 21:56

## 2021-01-01 RX ADMIN — CARBIDOPA AND LEVODOPA 2 TABLET(S): 25; 100 TABLET ORAL at 21:19

## 2021-01-01 RX ADMIN — Medication 1 MILLIGRAM(S): at 13:08

## 2021-01-01 RX ADMIN — CINACALCET 60 MILLIGRAM(S): 30 TABLET, FILM COATED ORAL at 13:02

## 2021-01-01 RX ADMIN — Medication 3 MILLILITER(S): at 17:16

## 2021-01-01 RX ADMIN — CHLORHEXIDINE GLUCONATE 1 APPLICATION(S): 213 SOLUTION TOPICAL at 06:09

## 2021-01-01 RX ADMIN — Medication 1: at 18:39

## 2021-01-01 RX ADMIN — DULOXETINE HYDROCHLORIDE 20 MILLIGRAM(S): 30 CAPSULE, DELAYED RELEASE ORAL at 10:46

## 2021-01-01 RX ADMIN — QUETIAPINE FUMARATE 25 MILLIGRAM(S): 200 TABLET, FILM COATED ORAL at 06:01

## 2021-01-01 RX ADMIN — Medication 1 MILLIGRAM(S): at 12:54

## 2021-01-01 RX ADMIN — DEXMEDETOMIDINE HYDROCHLORIDE IN 0.9% SODIUM CHLORIDE 18.1 MICROGRAM(S)/KG/HR: 4 INJECTION INTRAVENOUS at 23:41

## 2021-01-01 RX ADMIN — SEVELAMER CARBONATE 1600 MILLIGRAM(S): 2400 POWDER, FOR SUSPENSION ORAL at 22:04

## 2021-01-01 RX ADMIN — Medication 50 MILLIGRAM(S): at 06:22

## 2021-01-01 RX ADMIN — Medication 9.04 MICROGRAM(S)/KG/MIN: at 19:07

## 2021-01-01 RX ADMIN — Medication 25 MILLIGRAM(S): at 05:11

## 2021-01-01 RX ADMIN — Medication 25 MILLIGRAM(S): at 06:06

## 2021-01-01 RX ADMIN — INSULIN GLARGINE 6 UNIT(S): 100 INJECTION, SOLUTION SUBCUTANEOUS at 21:34

## 2021-01-01 RX ADMIN — Medication 1: at 12:03

## 2021-01-01 RX ADMIN — Medication 1 TABLET(S): at 13:38

## 2021-01-01 RX ADMIN — CARBIDOPA AND LEVODOPA 2 TABLET(S): 25; 100 TABLET ORAL at 22:19

## 2021-01-01 RX ADMIN — CARBIDOPA AND LEVODOPA 2.5 TABLET(S): 25; 100 TABLET ORAL at 05:17

## 2021-01-01 RX ADMIN — CARBIDOPA AND LEVODOPA 2 TABLET(S): 25; 100 TABLET ORAL at 21:13

## 2021-01-01 RX ADMIN — Medication 3: at 12:28

## 2021-01-01 RX ADMIN — CARBIDOPA AND LEVODOPA 2.5 TABLET(S): 25; 100 TABLET ORAL at 15:15

## 2021-01-01 RX ADMIN — DULOXETINE HYDROCHLORIDE 20 MILLIGRAM(S): 30 CAPSULE, DELAYED RELEASE ORAL at 12:33

## 2021-01-01 RX ADMIN — MEMANTINE HYDROCHLORIDE 5 MILLIGRAM(S): 10 TABLET ORAL at 22:15

## 2021-01-01 RX ADMIN — Medication 500 MILLIGRAM(S): at 11:46

## 2021-01-01 RX ADMIN — Medication 1000 UNIT(S): at 11:18

## 2021-01-01 RX ADMIN — Medication 25 GRAM(S): at 22:12

## 2021-01-01 RX ADMIN — CARBIDOPA AND LEVODOPA 2.5 TABLET(S): 25; 100 TABLET ORAL at 13:25

## 2021-01-01 RX ADMIN — Medication 25 MILLIGRAM(S): at 17:42

## 2021-01-01 RX ADMIN — CHLORHEXIDINE GLUCONATE 1 APPLICATION(S): 213 SOLUTION TOPICAL at 05:30

## 2021-01-01 RX ADMIN — ERYTHROPOIETIN 10000 UNIT(S): 10000 INJECTION, SOLUTION INTRAVENOUS; SUBCUTANEOUS at 19:39

## 2021-01-01 RX ADMIN — Medication 50 MICROGRAM(S): at 05:39

## 2021-01-01 RX ADMIN — SEVELAMER CARBONATE 1600 MILLIGRAM(S): 2400 POWDER, FOR SUSPENSION ORAL at 14:24

## 2021-01-01 RX ADMIN — CHLORHEXIDINE GLUCONATE 1 APPLICATION(S): 213 SOLUTION TOPICAL at 08:45

## 2021-01-01 RX ADMIN — CHLORHEXIDINE GLUCONATE 1 APPLICATION(S): 213 SOLUTION TOPICAL at 05:43

## 2021-01-01 RX ADMIN — Medication 1000 UNIT(S): at 12:25

## 2021-01-01 RX ADMIN — Medication 650 MILLIGRAM(S): at 18:30

## 2021-01-01 RX ADMIN — DULOXETINE HYDROCHLORIDE 20 MILLIGRAM(S): 30 CAPSULE, DELAYED RELEASE ORAL at 13:41

## 2021-01-01 RX ADMIN — Medication 1 MILLIGRAM(S): at 10:46

## 2021-01-01 RX ADMIN — QUETIAPINE FUMARATE 12.5 MILLIGRAM(S): 200 TABLET, FILM COATED ORAL at 21:23

## 2021-01-01 RX ADMIN — Medication 1000 UNIT(S): at 12:50

## 2021-01-01 RX ADMIN — Medication 650 MILLIGRAM(S): at 11:41

## 2021-01-01 RX ADMIN — CHLORHEXIDINE GLUCONATE 1 APPLICATION(S): 213 SOLUTION TOPICAL at 06:13

## 2021-01-01 RX ADMIN — QUETIAPINE FUMARATE 25 MILLIGRAM(S): 200 TABLET, FILM COATED ORAL at 22:30

## 2021-01-01 RX ADMIN — Medication 1000 UNIT(S): at 12:04

## 2021-01-01 RX ADMIN — Medication 2 MILLIGRAM(S): at 05:10

## 2021-01-01 RX ADMIN — OXYCODONE HYDROCHLORIDE 5 MILLIGRAM(S): 5 TABLET ORAL at 17:50

## 2021-01-01 RX ADMIN — Medication 25 MILLIGRAM(S): at 18:57

## 2021-01-01 RX ADMIN — DULOXETINE HYDROCHLORIDE 20 MILLIGRAM(S): 30 CAPSULE, DELAYED RELEASE ORAL at 11:58

## 2021-01-01 RX ADMIN — Medication 50 MILLIGRAM(S): at 13:42

## 2021-01-01 RX ADMIN — MEMANTINE HYDROCHLORIDE 5 MILLIGRAM(S): 10 TABLET ORAL at 22:03

## 2021-01-01 RX ADMIN — MIDODRINE HYDROCHLORIDE 20 MILLIGRAM(S): 2.5 TABLET ORAL at 12:47

## 2021-01-01 RX ADMIN — QUETIAPINE FUMARATE 25 MILLIGRAM(S): 200 TABLET, FILM COATED ORAL at 21:19

## 2021-01-01 RX ADMIN — DIVALPROEX SODIUM 250 MILLIGRAM(S): 500 TABLET, DELAYED RELEASE ORAL at 05:59

## 2021-01-01 RX ADMIN — DIVALPROEX SODIUM 250 MILLIGRAM(S): 500 TABLET, DELAYED RELEASE ORAL at 06:01

## 2021-01-01 RX ADMIN — Medication 650 MILLIGRAM(S): at 12:11

## 2021-01-01 RX ADMIN — Medication 650 MILLIGRAM(S): at 22:55

## 2021-01-01 RX ADMIN — HALOPERIDOL DECANOATE 5 MILLIGRAM(S): 100 INJECTION INTRAMUSCULAR at 06:01

## 2021-01-01 RX ADMIN — CARBIDOPA AND LEVODOPA 2.5 TABLET(S): 25; 100 TABLET ORAL at 06:40

## 2021-01-01 RX ADMIN — QUETIAPINE FUMARATE 25 MILLIGRAM(S): 200 TABLET, FILM COATED ORAL at 20:39

## 2021-01-01 RX ADMIN — Medication 25 MILLIGRAM(S): at 17:19

## 2021-01-01 RX ADMIN — Medication 650 MILLIGRAM(S): at 12:30

## 2021-01-01 RX ADMIN — CHLORHEXIDINE GLUCONATE 1 APPLICATION(S): 213 SOLUTION TOPICAL at 07:26

## 2021-01-01 RX ADMIN — QUETIAPINE FUMARATE 25 MILLIGRAM(S): 200 TABLET, FILM COATED ORAL at 13:43

## 2021-01-01 RX ADMIN — CARBIDOPA AND LEVODOPA 2.5 TABLET(S): 25; 100 TABLET ORAL at 05:24

## 2021-01-01 RX ADMIN — DIVALPROEX SODIUM 250 MILLIGRAM(S): 500 TABLET, DELAYED RELEASE ORAL at 22:56

## 2021-01-01 RX ADMIN — OXYCODONE HYDROCHLORIDE 5 MILLIGRAM(S): 5 TABLET ORAL at 10:11

## 2021-01-01 RX ADMIN — POLYETHYLENE GLYCOL 3350 17 GRAM(S): 17 POWDER, FOR SOLUTION ORAL at 06:53

## 2021-01-01 RX ADMIN — CARBIDOPA AND LEVODOPA 2.5 TABLET(S): 25; 100 TABLET ORAL at 12:30

## 2021-01-01 RX ADMIN — QUETIAPINE FUMARATE 25 MILLIGRAM(S): 200 TABLET, FILM COATED ORAL at 21:37

## 2021-01-01 RX ADMIN — Medication 25 MICROGRAM(S): at 05:11

## 2021-01-01 RX ADMIN — Medication 25 MILLIGRAM(S): at 18:48

## 2021-01-01 RX ADMIN — Medication 25 MILLIGRAM(S): at 05:25

## 2021-01-01 RX ADMIN — Medication 2 MILLIGRAM(S): at 14:02

## 2021-01-01 RX ADMIN — Medication 1: at 12:04

## 2021-01-01 RX ADMIN — Medication 3 UNIT(S): at 08:41

## 2021-01-01 RX ADMIN — Medication 25 MICROGRAM(S): at 10:52

## 2021-01-01 RX ADMIN — Medication 25 MILLIGRAM(S): at 17:48

## 2021-01-01 RX ADMIN — INSULIN GLARGINE 8 UNIT(S): 100 INJECTION, SOLUTION SUBCUTANEOUS at 22:40

## 2021-01-01 RX ADMIN — DIVALPROEX SODIUM 250 MILLIGRAM(S): 500 TABLET, DELAYED RELEASE ORAL at 07:19

## 2021-01-01 RX ADMIN — Medication 3 UNIT(S): at 17:42

## 2021-01-01 RX ADMIN — Medication 2 MILLIGRAM(S): at 05:24

## 2021-01-01 RX ADMIN — CARBIDOPA AND LEVODOPA 2.5 TABLET(S): 25; 100 TABLET ORAL at 16:01

## 2021-01-01 RX ADMIN — DULOXETINE HYDROCHLORIDE 20 MILLIGRAM(S): 30 CAPSULE, DELAYED RELEASE ORAL at 13:01

## 2021-01-01 RX ADMIN — CHLORHEXIDINE GLUCONATE 1 APPLICATION(S): 213 SOLUTION TOPICAL at 05:28

## 2021-01-01 RX ADMIN — Medication 25 MICROGRAM(S): at 12:04

## 2021-01-01 RX ADMIN — Medication 25 MILLIGRAM(S): at 05:55

## 2021-01-01 RX ADMIN — POLYETHYLENE GLYCOL 3350 17 GRAM(S): 17 POWDER, FOR SOLUTION ORAL at 05:57

## 2021-01-01 RX ADMIN — Medication 1000 UNIT(S): at 12:00

## 2021-01-01 RX ADMIN — CARBIDOPA AND LEVODOPA 2.5 TABLET(S): 25; 100 TABLET ORAL at 05:31

## 2021-01-01 RX ADMIN — Medication 50 MILLIGRAM(S): at 05:18

## 2021-01-01 RX ADMIN — MEMANTINE HYDROCHLORIDE 5 MILLIGRAM(S): 10 TABLET ORAL at 22:05

## 2021-01-01 RX ADMIN — MEMANTINE HYDROCHLORIDE 5 MILLIGRAM(S): 10 TABLET ORAL at 22:25

## 2021-01-01 RX ADMIN — Medication 1: at 06:00

## 2021-01-01 RX ADMIN — Medication 1 ENEMA: at 17:19

## 2021-01-01 RX ADMIN — Medication 1000 UNIT(S): at 12:30

## 2021-01-01 RX ADMIN — DULOXETINE HYDROCHLORIDE 20 MILLIGRAM(S): 30 CAPSULE, DELAYED RELEASE ORAL at 15:08

## 2021-01-01 RX ADMIN — QUETIAPINE FUMARATE 25 MILLIGRAM(S): 200 TABLET, FILM COATED ORAL at 13:13

## 2021-01-01 RX ADMIN — DEXMEDETOMIDINE HYDROCHLORIDE IN 0.9% SODIUM CHLORIDE 18.1 MICROGRAM(S)/KG/HR: 4 INJECTION INTRAVENOUS at 19:07

## 2021-01-01 RX ADMIN — POLYETHYLENE GLYCOL 3350 17 GRAM(S): 17 POWDER, FOR SOLUTION ORAL at 18:50

## 2021-01-01 RX ADMIN — CARBIDOPA AND LEVODOPA 2.5 TABLET(S): 25; 100 TABLET ORAL at 12:25

## 2021-01-01 RX ADMIN — Medication 3 MILLILITER(S): at 05:23

## 2021-01-01 RX ADMIN — CINACALCET 60 MILLIGRAM(S): 30 TABLET, FILM COATED ORAL at 13:09

## 2021-01-01 RX ADMIN — Medication 2 MILLIGRAM(S): at 05:18

## 2021-01-01 RX ADMIN — MEMANTINE HYDROCHLORIDE 5 MILLIGRAM(S): 10 TABLET ORAL at 22:57

## 2021-01-01 RX ADMIN — OXYCODONE HYDROCHLORIDE 5 MILLIGRAM(S): 5 TABLET ORAL at 08:41

## 2021-01-01 RX ADMIN — MEMANTINE HYDROCHLORIDE 5 MILLIGRAM(S): 10 TABLET ORAL at 21:06

## 2021-01-01 RX ADMIN — PIPERACILLIN AND TAZOBACTAM 25 GRAM(S): 4; .5 INJECTION, POWDER, LYOPHILIZED, FOR SOLUTION INTRAVENOUS at 06:23

## 2021-01-01 RX ADMIN — OXYCODONE HYDROCHLORIDE 2.5 MILLIGRAM(S): 5 TABLET ORAL at 06:39

## 2021-01-01 RX ADMIN — ONDANSETRON 4 MILLIGRAM(S): 8 TABLET, FILM COATED ORAL at 23:29

## 2021-01-01 RX ADMIN — Medication 1: at 14:00

## 2021-01-01 RX ADMIN — MUPIROCIN 1 APPLICATION(S): 20 OINTMENT TOPICAL at 17:03

## 2021-01-01 RX ADMIN — Medication 2 MILLIGRAM(S): at 21:51

## 2021-01-01 RX ADMIN — CHLORHEXIDINE GLUCONATE 1 APPLICATION(S): 213 SOLUTION TOPICAL at 06:10

## 2021-01-01 RX ADMIN — Medication 2 MILLIGRAM(S): at 22:03

## 2021-01-01 RX ADMIN — ATORVASTATIN CALCIUM 80 MILLIGRAM(S): 80 TABLET, FILM COATED ORAL at 22:41

## 2021-01-01 RX ADMIN — INSULIN GLARGINE 8 UNIT(S): 100 INJECTION, SOLUTION SUBCUTANEOUS at 21:24

## 2021-01-01 RX ADMIN — Medication 81 MILLIGRAM(S): at 12:13

## 2021-01-01 RX ADMIN — QUETIAPINE FUMARATE 12.5 MILLIGRAM(S): 200 TABLET, FILM COATED ORAL at 21:44

## 2021-01-01 RX ADMIN — IRON SUCROSE 110 MILLIGRAM(S): 20 INJECTION, SOLUTION INTRAVENOUS at 18:30

## 2021-01-01 RX ADMIN — MIDODRINE HYDROCHLORIDE 10 MILLIGRAM(S): 2.5 TABLET ORAL at 11:25

## 2021-01-01 RX ADMIN — Medication 1: at 17:37

## 2021-01-01 RX ADMIN — Medication 1334 MILLIGRAM(S): at 07:37

## 2021-01-01 RX ADMIN — OXYCODONE HYDROCHLORIDE 5 MILLIGRAM(S): 5 TABLET ORAL at 00:30

## 2021-01-01 RX ADMIN — SEVELAMER CARBONATE 1600 MILLIGRAM(S): 2400 POWDER, FOR SUSPENSION ORAL at 22:13

## 2021-01-01 RX ADMIN — SENNA PLUS 10 MILLILITER(S): 8.6 TABLET ORAL at 22:22

## 2021-01-01 RX ADMIN — Medication 650 MILLIGRAM(S): at 02:24

## 2021-01-01 RX ADMIN — DIVALPROEX SODIUM 250 MILLIGRAM(S): 500 TABLET, DELAYED RELEASE ORAL at 22:40

## 2021-01-01 RX ADMIN — SENNA PLUS 10 MILLILITER(S): 8.6 TABLET ORAL at 21:05

## 2021-01-01 RX ADMIN — DULOXETINE HYDROCHLORIDE 20 MILLIGRAM(S): 30 CAPSULE, DELAYED RELEASE ORAL at 10:44

## 2021-01-01 RX ADMIN — Medication 1 MILLIGRAM(S): at 13:22

## 2021-01-01 RX ADMIN — Medication 250 MILLIGRAM(S): at 09:12

## 2021-01-01 RX ADMIN — Medication 1000 UNIT(S): at 11:45

## 2021-01-01 RX ADMIN — Medication 2 MILLIGRAM(S): at 18:24

## 2021-01-01 RX ADMIN — Medication 1 MILLIGRAM(S): at 13:13

## 2021-01-01 RX ADMIN — CARBIDOPA AND LEVODOPA 2 TABLET(S): 25; 100 TABLET ORAL at 22:03

## 2021-01-01 RX ADMIN — Medication 1: at 08:22

## 2021-01-01 RX ADMIN — INSULIN GLARGINE 6 UNIT(S): 100 INJECTION, SOLUTION SUBCUTANEOUS at 22:06

## 2021-01-01 RX ADMIN — Medication 2 MILLIGRAM(S): at 22:21

## 2021-01-01 RX ADMIN — Medication 2: at 18:34

## 2021-01-01 RX ADMIN — Medication 12.5 GRAM(S): at 07:31

## 2021-01-01 RX ADMIN — Medication 1 TABLET(S): at 11:25

## 2021-01-01 RX ADMIN — ERYTHROPOIETIN 4000 UNIT(S): 10000 INJECTION, SOLUTION INTRAVENOUS; SUBCUTANEOUS at 09:38

## 2021-01-01 RX ADMIN — SEVELAMER CARBONATE 1600 MILLIGRAM(S): 2400 POWDER, FOR SUSPENSION ORAL at 17:50

## 2021-01-01 RX ADMIN — CARBIDOPA AND LEVODOPA 2 TABLET(S): 25; 100 TABLET ORAL at 21:11

## 2021-01-01 RX ADMIN — Medication 25 MICROGRAM(S): at 06:40

## 2021-01-01 RX ADMIN — Medication 81 MILLIGRAM(S): at 12:00

## 2021-01-01 RX ADMIN — POLYETHYLENE GLYCOL 3350 17 GRAM(S): 17 POWDER, FOR SOLUTION ORAL at 06:24

## 2021-01-01 RX ADMIN — INSULIN GLARGINE 8 UNIT(S): 100 INJECTION, SOLUTION SUBCUTANEOUS at 22:08

## 2021-01-01 RX ADMIN — ATORVASTATIN CALCIUM 80 MILLIGRAM(S): 80 TABLET, FILM COATED ORAL at 22:38

## 2021-01-01 RX ADMIN — Medication 2 MILLIGRAM(S): at 23:11

## 2021-01-01 RX ADMIN — SEVELAMER CARBONATE 1600 MILLIGRAM(S): 2400 POWDER, FOR SUSPENSION ORAL at 21:16

## 2021-01-01 RX ADMIN — Medication 650 MILLIGRAM(S): at 08:25

## 2021-01-01 RX ADMIN — CHLORHEXIDINE GLUCONATE 1 APPLICATION(S): 213 SOLUTION TOPICAL at 11:21

## 2021-01-01 RX ADMIN — Medication 3: at 12:02

## 2021-01-01 RX ADMIN — MIDODRINE HYDROCHLORIDE 10 MILLIGRAM(S): 2.5 TABLET ORAL at 08:04

## 2021-01-01 RX ADMIN — CARBIDOPA AND LEVODOPA 2 TABLET(S): 25; 100 TABLET ORAL at 21:45

## 2021-01-01 RX ADMIN — Medication 1000 UNIT(S): at 11:41

## 2021-01-01 RX ADMIN — SENNA PLUS 10 MILLILITER(S): 8.6 TABLET ORAL at 22:08

## 2021-01-01 RX ADMIN — Medication 2 MILLIGRAM(S): at 21:54

## 2021-01-01 RX ADMIN — DIVALPROEX SODIUM 250 MILLIGRAM(S): 500 TABLET, DELAYED RELEASE ORAL at 18:32

## 2021-01-01 RX ADMIN — Medication 25 MILLIGRAM(S): at 06:35

## 2021-01-01 RX ADMIN — ERYTHROPOIETIN 10000 UNIT(S): 10000 INJECTION, SOLUTION INTRAVENOUS; SUBCUTANEOUS at 19:48

## 2021-01-01 RX ADMIN — CARBIDOPA AND LEVODOPA 2.5 TABLET(S): 25; 100 TABLET ORAL at 15:57

## 2021-01-01 RX ADMIN — DIVALPROEX SODIUM 250 MILLIGRAM(S): 500 TABLET, DELAYED RELEASE ORAL at 21:03

## 2021-01-01 RX ADMIN — MEMANTINE HYDROCHLORIDE 5 MILLIGRAM(S): 10 TABLET ORAL at 21:57

## 2021-01-01 RX ADMIN — Medication 50 MICROGRAM(S): at 05:37

## 2021-01-01 RX ADMIN — Medication 50 MILLIGRAM(S): at 17:52

## 2021-01-01 RX ADMIN — Medication 2: at 00:43

## 2021-01-01 RX ADMIN — POLYETHYLENE GLYCOL 3350 17 GRAM(S): 17 POWDER, FOR SOLUTION ORAL at 17:04

## 2021-01-01 RX ADMIN — Medication 2 MILLIGRAM(S): at 21:21

## 2021-01-01 RX ADMIN — CARBIDOPA AND LEVODOPA 2 TABLET(S): 25; 100 TABLET ORAL at 13:00

## 2021-01-01 RX ADMIN — CARBIDOPA AND LEVODOPA 2.5 TABLET(S): 25; 100 TABLET ORAL at 05:13

## 2021-01-01 RX ADMIN — CHLORHEXIDINE GLUCONATE 1 APPLICATION(S): 213 SOLUTION TOPICAL at 05:57

## 2021-01-01 RX ADMIN — Medication 50 MILLIGRAM(S): at 17:32

## 2021-01-01 RX ADMIN — Medication 1000 UNIT(S): at 12:31

## 2021-01-01 RX ADMIN — OXYCODONE HYDROCHLORIDE 5 MILLIGRAM(S): 5 TABLET ORAL at 07:37

## 2021-01-01 RX ADMIN — SEVELAMER CARBONATE 1600 MILLIGRAM(S): 2400 POWDER, FOR SUSPENSION ORAL at 06:06

## 2021-01-01 RX ADMIN — POLYETHYLENE GLYCOL 3350 17 GRAM(S): 17 POWDER, FOR SOLUTION ORAL at 17:26

## 2021-01-01 RX ADMIN — CHLORHEXIDINE GLUCONATE 1 APPLICATION(S): 213 SOLUTION TOPICAL at 05:26

## 2021-01-01 RX ADMIN — Medication 1 TABLET(S): at 11:18

## 2021-01-01 RX ADMIN — CARBIDOPA AND LEVODOPA 2 TABLET(S): 25; 100 TABLET ORAL at 22:06

## 2021-01-01 RX ADMIN — Medication 2: at 13:02

## 2021-01-01 RX ADMIN — POLYETHYLENE GLYCOL 3350 17 GRAM(S): 17 POWDER, FOR SOLUTION ORAL at 06:35

## 2021-01-01 RX ADMIN — DULOXETINE HYDROCHLORIDE 20 MILLIGRAM(S): 30 CAPSULE, DELAYED RELEASE ORAL at 13:05

## 2021-01-01 RX ADMIN — Medication 2 MILLIGRAM(S): at 22:29

## 2021-01-01 RX ADMIN — Medication 200 MILLIGRAM(S): at 12:18

## 2021-01-01 RX ADMIN — Medication 650 MILLIGRAM(S): at 06:26

## 2021-01-01 RX ADMIN — Medication 1: at 12:11

## 2021-01-01 RX ADMIN — Medication 3 UNIT(S): at 17:18

## 2021-01-01 RX ADMIN — Medication 1000 UNIT(S): at 12:57

## 2021-01-01 RX ADMIN — Medication 1 TABLET(S): at 11:59

## 2021-01-01 RX ADMIN — CARBIDOPA AND LEVODOPA 2.5 TABLET(S): 25; 100 TABLET ORAL at 06:29

## 2021-01-01 RX ADMIN — CARBIDOPA AND LEVODOPA 2 TABLET(S): 25; 100 TABLET ORAL at 22:12

## 2021-01-01 RX ADMIN — Medication 650 MILLIGRAM(S): at 01:03

## 2021-01-01 RX ADMIN — MIDODRINE HYDROCHLORIDE 20 MILLIGRAM(S): 2.5 TABLET ORAL at 05:24

## 2021-01-01 RX ADMIN — DIVALPROEX SODIUM 250 MILLIGRAM(S): 500 TABLET, DELAYED RELEASE ORAL at 07:28

## 2021-01-01 RX ADMIN — Medication 25 MICROGRAM(S): at 05:55

## 2021-01-01 RX ADMIN — Medication 2 MILLIGRAM(S): at 13:57

## 2021-01-01 RX ADMIN — CARBIDOPA AND LEVODOPA 2.5 TABLET(S): 25; 100 TABLET ORAL at 06:45

## 2021-01-01 RX ADMIN — SENNA PLUS 10 MILLILITER(S): 8.6 TABLET ORAL at 21:22

## 2021-01-01 RX ADMIN — CARBIDOPA AND LEVODOPA 2.5 TABLET(S): 25; 100 TABLET ORAL at 13:31

## 2021-01-01 RX ADMIN — DIVALPROEX SODIUM 250 MILLIGRAM(S): 500 TABLET, DELAYED RELEASE ORAL at 05:43

## 2021-01-01 RX ADMIN — Medication 50 MILLIGRAM(S): at 17:30

## 2021-01-01 RX ADMIN — Medication 1 MILLIGRAM(S): at 14:04

## 2021-01-01 RX ADMIN — POLYETHYLENE GLYCOL 3350 17 GRAM(S): 17 POWDER, FOR SOLUTION ORAL at 05:16

## 2021-01-01 RX ADMIN — Medication 1 MILLIGRAM(S): at 12:50

## 2021-01-01 RX ADMIN — Medication 650 MILLIGRAM(S): at 21:18

## 2021-01-01 RX ADMIN — OXYCODONE HYDROCHLORIDE 5 MILLIGRAM(S): 5 TABLET ORAL at 08:22

## 2021-01-01 RX ADMIN — ATORVASTATIN CALCIUM 80 MILLIGRAM(S): 80 TABLET, FILM COATED ORAL at 22:20

## 2021-01-01 RX ADMIN — Medication 2 MILLIGRAM(S): at 06:45

## 2021-01-01 RX ADMIN — Medication 50 MILLIGRAM(S): at 21:20

## 2021-01-01 RX ADMIN — Medication 325 MILLIGRAM(S): at 13:33

## 2021-01-01 RX ADMIN — ATORVASTATIN CALCIUM 80 MILLIGRAM(S): 80 TABLET, FILM COATED ORAL at 22:57

## 2021-01-01 RX ADMIN — QUETIAPINE FUMARATE 25 MILLIGRAM(S): 200 TABLET, FILM COATED ORAL at 13:50

## 2021-01-01 RX ADMIN — DEXMEDETOMIDINE HYDROCHLORIDE IN 0.9% SODIUM CHLORIDE 18.1 MICROGRAM(S)/KG/HR: 4 INJECTION INTRAVENOUS at 00:11

## 2021-01-01 RX ADMIN — Medication 1 MILLIGRAM(S): at 14:25

## 2021-01-01 RX ADMIN — CHLORHEXIDINE GLUCONATE 1 APPLICATION(S): 213 SOLUTION TOPICAL at 06:22

## 2021-01-01 RX ADMIN — Medication 25 MILLIGRAM(S): at 10:53

## 2021-01-01 RX ADMIN — Medication 25 MILLIGRAM(S): at 08:45

## 2021-01-01 RX ADMIN — Medication 1: at 12:28

## 2021-01-01 RX ADMIN — DIVALPROEX SODIUM 250 MILLIGRAM(S): 500 TABLET, DELAYED RELEASE ORAL at 22:07

## 2021-01-01 RX ADMIN — MIDODRINE HYDROCHLORIDE 10 MILLIGRAM(S): 2.5 TABLET ORAL at 09:06

## 2021-01-01 RX ADMIN — Medication 1: at 08:08

## 2021-01-01 RX ADMIN — QUETIAPINE FUMARATE 12.5 MILLIGRAM(S): 200 TABLET, FILM COATED ORAL at 05:31

## 2021-09-22 PROBLEM — G20 PARKINSON DISEASE: Status: ACTIVE | Noted: 2017-07-18

## 2021-09-22 PROBLEM — F41.9 ANXIETY AND DEPRESSION: Status: ACTIVE | Noted: 2017-07-18

## 2021-09-22 NOTE — DISCUSSION/SUMMARY
[FreeTextEntry1] : 1) PD since 2012, much improved on sinemet and artane, but worse now than last year, and significant apathy\par 2) Anxiety and depression\par 3) Meningioma\par \par 1. Continue sinemet and artane. Tremor is still bothering him. Can increase sinemet back to 2.5 tabs first two doses. \par 2. For anxiety, monitor, would recommend medications, he wants to wait. \par 3. PT and exercise\par 4. Meningioma - repeat in 2021\par 5. Walker with seat as needed\par \par We discussed the above impression, plan and recommendations during the visit. Counseling represented more then 50% of the 30 minute visit time\par

## 2021-09-22 NOTE — HISTORY OF PRESENT ILLNESS
[FreeTextEntry1] : 70 yo man with PD since 2012. \par \par Retired now. \par \par 1. In December 2019, had AMI, requiring CABGx5. Was in rehab, now home. On metoprolol, ASA, lasix. Also on levothyroxine and insulin\par 2.  More tremor. Increased sinemet  to 2 - 2 - 2 tablet (3x/day, every 6 hours), no difference in tremor. , and artane 2mg 3x/day (tried to taper off; tremor got worse), which worked well overall. Gets very tired about an hour after taking the medication.  Feels good in the morning. No falls, uses cane. No clear motor fluctuations, but different days can be different. Did not try Sinememt ER. Tried rytary, too expensive. No hallucinations.\par 3. Some forgetfulness. No hallucinations. Anxiety not an issue according to him, but depression may be (he denies, he says he is "annoyed"). He does have apathy. He was on Paxil in the past (started in rehab) but stopped it because he did not think he needed it. Sleeps fitfully, no RBD. Manageable constipation.  \par 4. Was following with neurosurgery for a meningioma, MRI in October 2018 showed stable right meningioma. Repeat MRI 2/7/20 also stable.

## 2021-09-22 NOTE — PHYSICAL EXAM
[Person] : oriented to person [Place] : oriented to place [Time] : oriented to time [Short Term Intact] : short term memory intact [Registration Intact] : recent registration memory intact [Motor Handedness Right-Handed] : the patient is right hand dominant [FreeTextEntry4] : 2/3 delayed recall, 3/3 with hints [FreeTextEntry1] : Improved masked face, decreased blink, voice ok. EOMI, no SWJ. Tone increased in neck, and left>right arm, and left leg, worse than last visit. Parkinsonian tremor in both arms and legs today.\par \par RAHM and foot slowed on left>right (worse on left). Able to get up from chair without arms. Gait with improved but shortened stride, multistep turn. Pull test negative.\par

## 2021-10-20 NOTE — ED PROVIDER NOTE - CLINICAL SUMMARY MEDICAL DECISION MAKING FREE TEXT BOX
72 yo M with PMH of DM, CAD s/p CABG and CHF presents to the ED with worsening B/L LE edema for the past month that is weeping fluid. Most concerning for CHF exacerbation vs. DVT. Will obtain labs, cxr, ekg, trop, bnp. LE dopplers. Reassess.

## 2021-10-20 NOTE — ED PROVIDER NOTE - NS ED ROS FT
GENERAL: No fever, chills  CARDIAC: no chest pain/pressure, SOB, +lower extremity swelling  PULMONARY: no cough, SOB  GI: no abdominal pain, n/v/d  : no dysuria, no hematuria  SKIN: no rashes, no ecchymosis  NEURO: no headache, lightheadedness  MSK: No joint pain, myalgia, weakness.

## 2021-10-20 NOTE — ED PROVIDER NOTE - OBJECTIVE STATEMENT
70 yo M with PMH of CAD s/p CABG and CHF presents to the ED with worsening B/L LE edema for the past month that is weeping fluid. As per pt HCP (daughter-in-law) pt was supposed to increase lasix but is non complaint with medications. 70 yo M with PMH of DM, CAD s/p CABG and CHF presents to the ED with worsening B/L LE edema for the past month that is weeping fluid. As per pt HCP (daughter-in-law) pt was supposed to increase lasix but is non complaint with medications. Denies SOB, CP, fever, cough, abdominal pain, dysuria, n/v/d.

## 2021-10-20 NOTE — ED PROVIDER NOTE - CARE PLAN
Principal Discharge DX:	Acute on chronic systolic congestive heart failure  Secondary Diagnosis:	Acute renal failure   1

## 2021-10-20 NOTE — ED PROVIDER NOTE - ATTENDING CONTRIBUTION TO CARE
Dr. Erin Mojica: 72 yo M with PMH of DM, CAD s/p CABG and CHF presented to ED with bilateral LE edema. No shortness of breath or chest pain. On exam: Chest CTA, no murmur. +bilateral LE edema with increased warmth. Impression: Likely CHF exacerbation, no acute respiratory distress. R/o DVT. Plan: Labs, EKG, cxr, LE duplex, iv lasix. Admit

## 2021-10-20 NOTE — ED PROVIDER NOTE - PHYSICAL EXAMINATION
GEN: Patient awake alert NAD.   HEENT: normocephalic, atraumatic, EOMI, no scleral icterus, moist MM  CARDIAC: RRR, S1, S2, no murmur.   PULM: CTA B/L no wheeze, rhonchi, rales.   ABD: soft NT, ND, no rebound no guarding, no CVA tenderness.   MSK: Moving all extremities, B/L LE Weeping edema  NEURO: A&Ox3,   SKIN: B/L LE weeping edema and skin breakdown from toes proximally to just inferior to the knees. No draining purulent fluid without signs of cellulitis.

## 2021-10-20 NOTE — ED PROVIDER NOTE - RAPID ASSESSMENT
84 M with PMHx of CHF, CAD s/p angioplasty w/ stent, DM, HLD, HTN presents to the ER c/o bilateral lower extremity edema x2 days. Per aide, pt had Lasix dose increased due to edema w/o improvement. Of note, Aide noticed NEWBY recently. Denies chest pain, SOB, fever, or chills. Pt in no acute distress.     Scribe Statement: IKavitha Tiffany, attest that this documentation has been prepared under the direction and in the presence of Charlie Bustillo) 84 M with PMHx of CHF, CAD s/p angioplasty w/ stent, DM, HLD, HTN presents to the ER c/o bilateral lower extremity edema x2 days. Per aide, pt had Lasix dose increased due to edema w/o improvement. aid reports weeping bilateral ext edema and noticed visualized NEWBY recently. Pt denies chest pain, SOB, fever, or chills. Pt in no acute distress.     Scribe Statement: I, Fide Plummer, attest that this documentation has been prepared under the direction and in the presence of Charlie Bustillo ([place])    Charlie DE LA PAZ PA-C saw patient as a rapid assessment initially via telemedicine encounter. The rest of care to be performed by the primary ED team. Rapid assessment documented by julieth in my presence. I have personally reviewed and approved the note written by the scribe. Receiving team will follow up on labs, analgesia, any clinical imaging, and perform reassessment and disposition of the patient as clinically indicated. All decisions regarding the progression of care will be made at their discretion.

## 2021-10-21 NOTE — PHYSICAL THERAPY INITIAL EVALUATION ADULT - LIVES WITH, PROFILE
Pt lives with son, daughter-in-law, and grandchildren in private home with 7-8 stairs to entrance, pt with first floor bedroom. Prior to admission, pt independent with ADL's and ambulation, ambulated without AD however held walls./children

## 2021-10-21 NOTE — PHYSICAL THERAPY INITIAL EVALUATION ADULT - TRANSFER TRAINING, PT EVAL
GOAL: Pt will perform ALL transfers with MIN assist, w/use of appropriate assistive device as needed, in 8 weeks.

## 2021-10-21 NOTE — H&P ADULT - NSHPLABSRESULTS_GEN_ALL_CORE
Personally reviewed labs, EKG and images    EKG: tachycardic atrial flutter    Images: CXR-large left sided pleural effusion

## 2021-10-21 NOTE — H&P ADULT - ASSESSMENT
70yo M w/ PMHx of CAD (s/p CABG 2019), CKD (unknown stage), DM2, Parkinson's Disease, HTN, depression presents with new onset acute heart failure exacerbation

## 2021-10-21 NOTE — PHYSICAL THERAPY INITIAL EVALUATION ADULT - MANUAL MUSCLE TESTING RESULTS, REHAB EVAL
BUE grossly at least 3/5 throughout/grossly assessed due to BUE grossly at least 3/5 throughout, BLE grossly 2-/5/grossly assessed due to

## 2021-10-21 NOTE — PHYSICAL THERAPY INITIAL EVALUATION ADULT - TRANSFER SAFETY CONCERNS NOTED: SIT/STAND, REHAB EVAL
decreased safety awareness/losing balance/decreased sequencing ability/decreased step length/decreased weight-shifting ability

## 2021-10-21 NOTE — CONSULT NOTE ADULT - SUBJECTIVE AND OBJECTIVE BOX
Wound SURGERY CONSULT NOTE    HPI:  70yo M w/ PMHx of CAD (s/p CABG 2019), CKD (unknown stage), DM2, Parkinson's Disease, HTN, depression presents with bilateral leg swelling, patient's daughter in-law at bedside Yoly Quintero ([address] [place] [address] Nurse) provides the history, the daughter reports the patient is a poor historian, unable to remember having conversations with others and likely cannot weigh risk/benefits of medical conditions, she reports that he has had bilateral lower extremity swelling for the past few months, but over the past 2 weeks it has significantly worsened, he has further difficulty ambulating due to swelling, his outpatient provider attempted to manage with 20mg lasix and then increased to 40mg lasix but the patient never took the increased dose, his symptoms ar persistent throughout the day and are extremely severe, he has developed wounds of the legs with weeping of fluid due to the swelling, she reports that the patient is frequently non-compliant with medications and medical management, he lives at home with his son and daughter in law, he denies chest pain, shortness of breath, fever/chills, cough, sputum, in the ED, he was tachycardic but hemodynamically stable, afebrile, saturating well on room air, labs were significant for elevated BNP, elevated creatinine, imaging showed moderate left pleural effusion, patient was admitted to general medicine for further management    Wound consult requested to assist w/ management of buttock and RLE wound.  Pt w/o   c/o pain, drainage, odor, color change, or worsening swelling.  Not sure what is done at home to care for wounds Increasingly sedentary.  Incontinent of urine & stool. offloading and pericare initiated. Appetite good.    Current Diet: Diet, Regular:   Consistent Carbohydrate Evening Snack (CSTCHOSN)  DASH/TLC Sodium & Cholesterol Restricted (DASH)  1500mL Fluid Restriction ([id number]) (10-21-21 @ 07:51)      PAST MEDICAL & SURGICAL HISTORY:  Hypertension    Diabetes Mellitus, Type 2    Hypercholesterolemia    Parkinson disease    CAD (coronary artery disease)  s/p 1 stent  and CABG    S/P CABG (coronary artery bypass graft)    S/P hip replacement, right    Anxiety    Depression, major        REVIEW OF SYSTEMS:General/ Skin: see HPI  All other systems negative    MEDICATIONS  (STANDING):  aspirin 325 milliGRAM(s) Oral daily  atorvastatin 80 milliGRAM(s) Oral at bedtime  calcium acetate 667 milliGRAM(s) Oral three times a day with meals  carbidopa/levodopa  25/100 2 Tablet(s) Oral three times a day  cholecalciferol 1000 Unit(s) Oral daily  dextrose 40% Gel 15 Gram(s) Oral once  dextrose 5%. 1000 milliLiter(s) (50 mL/Hr) IV Continuous <Continuous>  dextrose 5%. 1000 milliLiter(s) (100 mL/Hr) IV Continuous <Continuous>  dextrose 50% Injectable 25 Gram(s) IV Push once  dextrose 50% Injectable 12.5 Gram(s) IV Push once  dextrose 50% Injectable 25 Gram(s) IV Push once  folic acid 1 milliGRAM(s) Oral daily  furosemide   Injectable 60 milliGRAM(s) IV Push two times a day  glucagon  Injectable 1 milliGRAM(s) IntraMuscular once  heparin   Injectable 5000 Unit(s) SubCutaneous every 12 hours  insulin glargine Injectable (LANTUS) 22 Unit(s) SubCutaneous at bedtime  insulin lispro (ADMELOG) corrective regimen sliding scale   SubCutaneous three times a day before meals  insulin lispro (ADMELOG) corrective regimen sliding scale   SubCutaneous at bedtime  metoprolol tartrate 25 milliGRAM(s) Oral two times a day  trihexyphenidyl 2 milliGRAM(s) Oral three times a day    Allergies  adhesives (Rash)  latex (Urticaria)    SOCIAL HISTORY:  lives w/ son & daughter inlaw; Denies smoking, ETOH, drugs    FAMILY HISTORY:  No h/o PVD or skin/ wound healing issues       (+) Family history of hypertension,  malaria, &  pulmonary fibrosis (Sibling)        PHYSICAL EXAM:  Vital Signs Last 24 Hrs  T(C): 36.7 (21 Oct 2021 11:18), Max: 36.7 (21 Oct 2021 11:18)  T(F): 98.1 (21 Oct 2021 11:18), Max: 98.1 (21 Oct 2021 11:18)  HR: 89 (21 Oct 2021 11:18) (89 - 125)  BP: 109/68 (21 Oct 2021 11:18) (109/68 - 158/85)  BP(mean): --  RR: 18 (21 Oct 2021 11:18) (18 - 24)  SpO2: 96% (21 Oct 2021 11:18) (96% - 99%)    NAD,  A&Ox3/ Obese/ frail  HEENT:  NC/AT, PERRL, EOMI, mucosa moist, throat clear, trachea midline, neck supple  Cardiovascular: RRR  Respiratory: CTA  Gastrointestinal soft NT/ND (+)BS    Neurology:  weakened strength & sensation grossly intact  Musculoskeletal:  no deformities/ contractures  Vascular: BLE equally warm,  BLE edema equal &      L>R DP/PT pulses palpable     no acute ischemia noted     R>LLE hemosiderin staining     RLE dry serous crusting  Skin:  dry flakey skin     hyperpigmented skin deep maroon w/ denuded areas     no drainage  No odor, erythema, increased warmth, tenderness, induration, fluctuance    LABS/ CULTURES/ RADIOLOGY:                        7.8    10.92 )-----------( 235      ( 21 Oct 2021 07:14 )             25.0       136  |  103  |  101  ----------------------------<  41      [10-21-21 @ 07:14]  4.0   |  14  |  6.29        Ca     9.2     [10-21-21 @ 07:14]      Mg     2.1     [10-21-21 @ 07:14]      Phos  7.3     [10-21-21 @ 07:14]    TPro  7.3  /  Alb  3.3  /  TBili  0.3  /  DBili  x   /  AST  7   /  ALT  <5  /  AlkPhos  91  [10-21-21 @ 07:14]    PT/INR: PT 13.0 , INR 1.09       [10-20-21 @ 21:01]  PTT: 53.2       [10-20-21 @ 21:01]      INTERPRETATION:  CLINICAL INFORMATION: Lower extremity swelling.    TECHNIQUE: 2 views of the bilateral tibia and fibula..    COMPARISON: None.    FINDINGS/  IMPRESSION:  There is no fracture or dislocation. Significant subcutaneous edema is present bilaterally. Bilateral knee moderate knee degenerative changes are present. In the right leg there are surgical clips at the medial knee. Prominentvascular calcifications.

## 2021-10-21 NOTE — ED ADULT NURSE NOTE - OBJECTIVE STATEMENT
71yM, A&Ox3, PMH CHF, CAD s/p angioplasty w/ stent, DM, HLD, HTN presents to the ED c/o bilateral lower extremity weeping edema x2 days. Per pts daughter in law (HCP), pt had Lasix dose increased due to edema w/o improvement. She reports weeping bilateral ext edema and noticed visualized NEWBY recently. Gross neuro intact, no difficulty speaking in complete sentences, pulses x 4, moving all extremities, abdomen soft nontender nondistended. Pt denies fevers/chills, numbness/tingling, weakness, headache, dizziness, vision changes, cp, cough, abd pain, n/v/d, dysuria, hematuria, bloody stools.

## 2021-10-21 NOTE — ED ADULT NURSE REASSESSMENT NOTE - NS ED NURSE REASSESS COMMENT FT1
Received report from Angela FORDE. Pt. A&Ox3, sitting up in stretcher, skin pink warm and dry. Pt. does not appear to be in any acute distress -nonlabored breathing noted and pt. is speaking in full coherent sentences. Pt. is admitted to tele and awaiting bed assignment. Safety and comfort provided.

## 2021-10-21 NOTE — ED ADULT NURSE REASSESSMENT NOTE - NS ED NURSE REASSESS COMMENT FT1
FS 44 - pt. provided juice and sergio crackers. Pt. asymptomatic - denies n/v, dizziness, lightheadedness and diaphoresis. Repeat FS 71. ZACH Penny made aware. States to administer 12.5 g of dextrose.

## 2021-10-21 NOTE — H&P ADULT - HISTORY OF PRESENT ILLNESS
70yo M w/ PMHx of CAD (s/p CABG 2019), CKD (unknown stage), DM2, Parkinson's Disease, HTN, depression presents with bilateral leg swelling, patient's daughter in-law at bedside Yoly Quintero ([address] [place] [address] Nurse) provides the history, the daughter reports the patient is a poor historian, unable to remember having conversations with others and likely cannot weigh risk/benefits of medical conditions, she reports that he has had bilateral lower extremity swelling for the past few months, but over the past 2 weeks it has significantly worsened, he has further difficulty ambulating due to swelling, his outpatient provider attempted to manage with 20mg lasix and then increased to 40mg lasix but the patient never took the increased dose, his symptoms ar persistent throughout the day and are extremely severe, he has developed wounds of the legs with weeping of fluid due to the swelling, she reports that the patient is frequently non-compliant with medications and medical management, he lives at home with his son and daughter in law, he denies chest pain, shortness of breath, fever/chills, cough, sputum, in the ED, he was tachycardic but hemodynamically stable, afebrile, saturating well on room air, labs were significant for elevated BNP, elevated creatinine, imaging showed moderate left pleural effusion, patient was admitted to general medicine for further management

## 2021-10-21 NOTE — CHART NOTE - NSCHARTNOTEFT_GEN_A_CORE
Brief cardiology note    called to evaluate patient. currently he is off the floor for procedure.   will evaluate tomorrow.   please call with questions in the interim.     Go Bobby D.O.  [phone]

## 2021-10-21 NOTE — H&P ADULT - PROBLEM SELECTOR PLAN 4
-c/w atorvastatin   -patient on full dose aspirin for unclear reasons, please clarify in AM (family at bedside unsure of reason)

## 2021-10-21 NOTE — H&P ADULT - PROBLEM SELECTOR PLAN 1
-last echo 2019 showed normal EF with mild diastolic dysfunction  -elevated BNP with lower extremity edema  -will require high doses of lasix secondary to signficant renal disease (no clear CKD diagnosis but had elevated Cr in 2019, likely acute on chronic renal failure now secondary to cardiorenal syndrome)  -start lasix 60mg IV BID uptitrate as needed, monitor blood pressure  -obtain echo complete  -if renal function does not improve obtain urine studies   -strict I/O  -fluid restriction -last echo 2019 showed normal EF with mild diastolic dysfunction  -elevated BNP with lower extremity edema  -will require high doses of lasix secondary to signficant renal disease (no clear CKD diagnosis but had elevated Cr in 2019, likely acute on chronic renal failure now secondary to cardiorenal syndrome)  -start lasix 60mg IV BID uptitrate as needed, monitor blood pressure  -obtain echo complete  -if renal function does not improve obtain urine studies   -strict I/O  -fluid restriction  -troponin likely elevated in setting of renal disease, trend to peak

## 2021-10-21 NOTE — H&P ADULT - NSHPPHYSICALEXAM_GEN_ALL_CORE
Vital Signs Last 24 Hrs  T(C): 36.5 (21 Oct 2021 06:46), Max: 36.5 (21 Oct 2021 06:46)  T(F): 97.7 (21 Oct 2021 06:46), Max: 97.7 (21 Oct 2021 06:46)  HR: 125 (21 Oct 2021 06:46) (111 - 125)  BP: 129/76 (21 Oct 2021 06:46) (129/76 - 152/91)  BP(mean): --  RR: 18 (21 Oct 2021 06:46) (18 - 24)  SpO2: 97% (21 Oct 2021 06:46) (97% - 99%)

## 2021-10-21 NOTE — H&P ADULT - CONVERSATION DETAILS
I spent 16 minutes discussing goals of care with the patient's daughter-in-law Yoly Quintero face-to-face at bedside, she reports that recently the patient medically has been declining and his quality of life has decreased, he is frequently non-compliant with medication and management but she feels as though he suffers from depression and anxiety which are affecting him the most in the addition to his leg swelling, he was prescribed medication in the past for depression/anxiety but self discontinued it, regarding goals of care, she says that she understands he might have a bad outcome if he were to be intubated or resuscitated, she is not completely sure of his wishes but would like us to further discuss in a conversation between the patient and the patient's son Yunier Quintero so his advance directives can be decided upon together. For now she would like to keep the patient full code pending their discussion

## 2021-10-21 NOTE — PHYSICAL THERAPY INITIAL EVALUATION ADULT - ACTIVE RANGE OF MOTION EXAMINATION, REHAB EVAL
difficult to access due to decreased ability to follow commands ; BUE/BLE AAROM WFL/bilateral upper extremity Active ROM was WFL (within functional limits)/bilateral  lower extremity Active ROM was WFL (within functional limits)

## 2021-10-21 NOTE — H&P ADULT - NSICDXPASTSURGICALHX_GEN_ALL_CORE_FT
PAST SURGICAL HISTORY:  Anxiety     Depression, major     S/P CABG (coronary artery bypass graft) 2019    S/P hip replacement, right 2016

## 2021-10-21 NOTE — H&P ADULT - PROBLEM SELECTOR PLAN 2
-unclear if new diagnosis or known, patient was prescribed amiodarone on previous hospital admissions but I was unable to find a clear rhythm disease in chart search  -c/w metoprolol (patient takes 25mg tartrate once daily, change to 25mg BID, uptitrate as needed)  -monitor on tele

## 2021-10-21 NOTE — CONSULT NOTE ADULT - ASSESSMENT
A/P: 72yo M w/ PMHx of CAD (s/p CABG 2019), CKD (unknown stage), DM2, Parkinson's Disease, HTN, depression presents with new onset acute heart failure exacerbation     Wound Consult requested to assist w/ management of  BLE PVD w/ venous stasis   RLE wounds  Incontinence Dermatitis vs Deep tissue damage of Sacrum / Rt buttocks  Incontinent of stool and urine    BLE cleansing w/ moisturizing and Compression   Consider  Duplex      xray results - no acute pathology  BLE elevation  Abx per Medicine/ ID  Moisturize intact skin w/ SWEEN cream BID  Nutrition Consult for optimization        consider MVI & Vit C to promote wound healing        encourage high quality protein  Hypo / hyperglycemia - ADA diet w/ FS w/ ISS qmeal and qhs  Continue turning and positioning w/ offloading assistive devices as per protocol  Continue w/ Pericare as per protocol  Waffle Cushion to chair when oob to chair  Continue w/ low air loss bed surface   Care as per medicine, will follow w/ you  Upon discharge f/[facility] as outpatient at [facility] [address] [place], [place] [id number]  D/w team & RN  Thank you for this consult  Nakita Persaud PA-C [id number]  I spent 50minutes w/ this pt of which more than 50% of the time was spent counseling & coordinating care of this pt.

## 2021-10-21 NOTE — H&P ADULT - NSHPSOCIALHISTORY_GEN_ALL_CORE
denies current alcohol, recreational drug or tobacco use, lives with son and daughter-in-law at home

## 2021-10-21 NOTE — CONSULT NOTE ADULT - ASSESSMENT
70yo M w/ PMHx of CAD (s/p CABG 2019), CKD (unknown stage), DM2, Parkinson's Disease, HTN, depression presents with bilateral leg swelling  Mild CHF  Acute on chronic renal failure   Hyperphosphatemia  Metabolic acidosis     1 -Bladder scan and official renal sono  2 Renal- Advanced renal failure and hopefully there is some reversibility.  He is volume overloaded at present with radiographic evidence of CHF and + JVD; COnt IV lasix  Check PTH and start Phoslo  CHeck Hep profiles  He may need HD on this admission   3 [facility]-Awaiting echo.  Tachycardia at present on lopressor     Call placed to son but did not connect   DW PCP     [facility]   [facility]   [phone]    72yo M w/ PMHx of CAD (s/p CABG 2019), CKD (unknown stage), DM2, Parkinson's Disease, HTN, depression presents with bilateral leg swelling  Mild CHF  Acute on chronic renal failure   Hyperphosphatemia  Metabolic acidosis   NSTEMI at present and cards is following     1 -Bladder scan and official renal sono  2 Renal- Advanced renal failure and hopefully there is some reversibility.  He is volume overloaded at present with radiographic evidence of CHF and + JVD; Cont IV lasix  Check PTH and start Phoslo  CHeck Hep profiles  He may need HD on this admission   Quantify proteinuria burden   3 [facility]-Awaiting echo.  Tachycardia at present on lopressor   4 Anemia- Check iron stores n check SPEP     LE dopplers ordered     Call placed to son but did not connect   DW PCP     [facility]   [phone]

## 2021-10-21 NOTE — H&P ADULT - NSHPADDITIONALINFOADULT_GEN_ALL_CORE
Note: please communicate all care plans with either patient's son Yunier Quintero at [phone] or daughter-in-law Yoly Quintero at [phone], the patient frequently cannot remember care plans or who he talked to so they request to be notified with all care plans and updates

## 2021-10-21 NOTE — CONSULT NOTE ADULT - SUBJECTIVE AND OBJECTIVE BOX
NEPHROLOGY - NSN    Patient seen and examined.    HPI:  72yo M w/ PMHx of CAD (s/p CABG 2019), CKD (unknown stage), DM2, Parkinson's Disease, HTN, depression presents with bilateral leg swelling, patient's daughter in-law at bedside Yoly Quintero ([address] [place] [address] Nurse) provides the history, the daughter reports the patient is a poor historian, unable to remember having conversations with others and likely cannot weigh risk/benefits of medical conditions, she reports that he has had bilateral lower extremity swelling for the past few months, but over the past 2 weeks it has significantly worsened, he has further difficulty ambulating due to swelling, his outpatient provider attempted to manage with 20mg lasix and then increased to 40mg lasix but the patient never took the increased dose, his symptoms ar persistent throughout the day and are extremely severe, he has developed wounds of the legs with weeping of fluid due to the swelling, she reports that the patient is frequently non-compliant with medications and medical management, he lives at home with his son and daughter in law, he denies chest pain, shortness of breath, fever/chills, cough, sputum, in the ED, he was tachycardic but hemodynamically stable, afebrile, saturating well on room air, labs were significant for elevated BNP, elevated creatinine, imaging showed moderate left pleural effusion, patient was admitted to general medicine for further management  (21 Oct 2021 07:06)  He is not on oxygen at present and in no respiratory distress  His creatinine in 2019 was 2.2 and now over 6.0.  He is not the greatest historian   There is no hematuria or bubbles in the urine.  No history of NSAIDS or nephrolithisis.  The patient urinates once or twice in the night and there is no incontinence.  No family hx or renal disease or back pain.    No recent abx use.  No alleviating or aggravating factors with respect to the kidneys.   + LE edema     PAST MEDICAL & SURGICAL HISTORY:  Hypertension    Diabetes Mellitus, Type 2    Hypercholesterolemia    Parkinson disease    CAD (coronary artery disease)  s/p 1 stent in 2010 and CABG 2019    S/P CABG (coronary artery bypass graft)  2019    S/P hip replacement, right  2016    Anxiety    Depression, major        MEDICATIONS  (STANDING):  aspirin 325 milliGRAM(s) Oral daily  atorvastatin 80 milliGRAM(s) Oral at bedtime  carbidopa/levodopa  25/100 2 Tablet(s) Oral three times a day  cholecalciferol 1000 Unit(s) Oral daily  dextrose 40% Gel 15 Gram(s) Oral once  dextrose 5%. 1000 milliLiter(s) (50 mL/Hr) IV Continuous <Continuous>  dextrose 5%. 1000 milliLiter(s) (100 mL/Hr) IV Continuous <Continuous>  dextrose 50% Injectable 25 Gram(s) IV Push once  dextrose 50% Injectable 12.5 Gram(s) IV Push once  dextrose 50% Injectable 25 Gram(s) IV Push once  folic acid 1 milliGRAM(s) Oral daily  furosemide   Injectable 60 milliGRAM(s) IV Push two times a day  glucagon  Injectable 1 milliGRAM(s) IntraMuscular once  heparin   Injectable 5000 Unit(s) SubCutaneous every 12 hours  insulin glargine Injectable (LANTUS) 22 Unit(s) SubCutaneous at bedtime  insulin lispro (ADMELOG) corrective regimen sliding scale   SubCutaneous three times a day before meals  insulin lispro (ADMELOG) corrective regimen sliding scale   SubCutaneous at bedtime  metoprolol tartrate 25 milliGRAM(s) Oral two times a day  trihexyphenidyl 2 milliGRAM(s) Oral three times a day      Allergies    adhesives (Rash)  latex (Urticaria)  No Known Drug Allergies    Intolerances        SOCIAL HISTORY:  Denies alcohol abuse, drug abuse or tobacco usage.     FAMILY HISTORY:  Family history of hypertension    Family history of malaria    Family history of pulmonary fibrosis (Sibling)        VITALS:  T(C): 36.7 (10-21-21 @ 11:18), Max: 36.7 (10-21-21 @ 11:18)  HR: 89 (10-21-21 @ 11:18) (89 - 125)  BP: 109/68 (10-21-21 @ 11:18) (109/68 - 158/85)  RR: 18 (10-21-21 @ 11:18) (18 - 24)  SpO2: 96% (10-21-21 @ 11:18) (96% - 99%)    REVIEW OF SYSTEMS:  Denies any nausea, vomiting, diarrhea, fever or chills.   Good oral intake and denies fatigue or weakness. All other pertinent systems are reviewed and are negative.    PHYSICAL EXAM:  Constitutional: NAD  HEENT: EOMI  Neck:  +  JVD, supple   Respiratory: CTA B/L  Cardiovascular: S1 and S2, RRR  Gastrointestinal: + BS, soft, NT, ND  Extremities: ++  peripheral edema, + peripheral pulses  Neurological: A/O x 3, CN2-12 intact  Psychiatric: Normal mood, normal affect  : No Javier  Skin: erythema n thick skin at the shins   Access: Not applicable    I and O's:    Height (cm): 180.3 (10-20 @ 20:03)  Weight (kg): 106.6 (10-20 @ 20:03)  BMI (kg/m2): 32.8 (10-20 @ 20:03)  BSA (m2): 2.26 (10-20 @ 20:03)    LABS:                        7.8    10.92 )-----------( 235      ( 21 Oct 2021 07:14 )             25.0     10-21    136  |  103  |  101<H>  ----------------------------<  41<LL>  4.0   |  14<L>  |  6.29<H>    Ca    9.2      21 Oct 2021 07:14  Phos  7.3     10-21  Mg     2.1     10-21    TPro  7.3  /  Alb  3.3  /  TBili  0.3  /  DBili  x   /  AST  7<L>  /  ALT  <5<L>  /  AlkPhos  91  10-21      URINE:      RADIOLOGY & ADDITIONAL STUDIES:    < from: Xray Chest 1 View- PORTABLE-Urgent (Xray Chest 1 View- PORTABLE-Urgent .) (10.21.21 @ 00:26) >  EXAM:  XR CHEST PORTABLE URGENT 1V                            PROCEDURE DATE:  10/21/2021            INTERPRETATION:  CLINICAL INFORMATION: Lower extremity swelling.    TECHNIQUE: Frontal radiograph of the chest    COMPARISON: 10/13/2019.    FINDINGS:    Heart size and the mediastinum cannot be accurately evaluated on this projection. Median sternotomy sutures and surgical clips again seen.    Mild right basilar subsegmental atelectasis.  There is mild asymmetric pulmonary vascular congestion on the left.  Left lower and retrocardiac opacity with obscuration of left hemidiaphragm appears slightly smaller.  No right pleural effusion.  No pneumothorax.  There is osteoarthritic degenerative change of the spine.      IMPRESSION:    Mild right basilar subsegmental atelectasis.    Mild asymmetric pulmonary vascular congestion on the left.    Left lower and retrocardiac opacity which could be due to a moderate size left pleural effusion with associated passive atelectasis, appears slightly smaller. Atelectasis of other cause and/or other underlying pathology is not excluded.    --- End of Report ---              ASHWIN AGRAWAL MD; Resident Interventional Radiology  This document has been electronically signed.  BOBBY MATA MD; Attending Radiologist  This document has been electronically signed. Oct 21 2021 11:23AM    < end of copied text >

## 2021-10-21 NOTE — H&P ADULT - NSICDXPASTMEDICALHX_GEN_ALL_CORE_FT
PAST MEDICAL HISTORY:  CAD (coronary artery disease) s/p 1 stent in 2010 and CABG 2019    Diabetes Mellitus, Type 2     Hypercholesterolemia     Hypertension     Parkinson disease

## 2021-10-22 NOTE — DIETITIAN INITIAL EVALUATION ADULT. - PROBLEM SELECTOR PLAN 1
-last echo 2019 showed normal EF with mild diastolic dysfunction  -elevated BNP with lower extremity edema  -will require high doses of lasix secondary to signficant renal disease (no clear CKD diagnosis but had elevated Cr in 2019, likely acute on chronic renal failure now secondary to cardiorenal syndrome)  -start lasix 60mg IV BID uptitrate as needed, monitor blood pressure  -obtain echo complete  -if renal function does not improve obtain urine studies   -strict I/O  -fluid restriction  -troponin likely elevated in setting of renal disease, trend to peak

## 2021-10-22 NOTE — DIETITIAN INITIAL EVALUATION ADULT. - OTHER CALCULATIONS
needs based on current flow sheet weight, consideration taken for BMI in overweight range and DDTI, fluid deterred to MD due to current fluid restriction

## 2021-10-22 NOTE — CONSULT NOTE ADULT - ASSESSMENT
70yo M w/ PMHx of CAD (s/p CABG 2019), CKD (unknown stage), DM2, Parkinson's Disease, HTN, depression presents with bilateral leg swelling    TTE  1. Calcified trileaflet aortic valve with normal opening.  Peak transaortic valve gradient equals 10 mm Hg, mean  transaortic valve gradient equals 6 mm Hg, aortic valve  velocity time integral equals 40 cm, estimated aortic valve  area equals 2.1 sqcm.  2. Endocardial visualization enhanced with intravenous  injection of Ultrasonic Enhancing Agent (Definity).  moderate to severe segmental left ventricular systolic  dysfunction. There is hypokinesis of anterior wall, septum  and apex. Visual estimate of EF about 35%.  3. Normal right ventricular size with decreased right  ventricular systolic function.  4. Normal tricuspid valve. Mild tricuspid regurgitation.  5. Estimated pulmonary artery systolic pressure equals 24  mm Hg, assuming right atrial pressure equals 8  mm Hg,  consistent with normal pulmonary pressures.  6. Bilateral pleural effusions.

## 2021-10-22 NOTE — PROVIDER CONTACT NOTE (CRITICAL VALUE NOTIFICATION) - ACTION/TREATMENT ORDERED:
Ordered to follow heparin nomogram,  Hold hep for 60 min, then  restart at a lower rate.  Will continue to monitor.

## 2021-10-22 NOTE — CONSULT NOTE ADULT - SUBJECTIVE AND OBJECTIVE BOX
HPI:  70yo M w/ PMHx of CAD (s/p CABG 2019), CKD (unknown stage), DM2, Parkinson's Disease, HTN, depression presents with bilateral leg swelling, patient's daughter in-law at bedside Yoly Quintero ([address] [place] [address] Nurse) provides the history, the daughter reports the patient is a poor historian, unable to remember having conversations with others and likely cannot weigh risk/benefits of medical conditions, she reports that he has had bilateral lower extremity swelling for the past few months, but over the past 2 weeks it has significantly worsened, he has further difficulty ambulating due to swelling, his outpatient provider attempted to manage with 20mg lasix and then increased to 40mg lasix but the patient never took the increased dose, his symptoms ar persistent throughout the day and are extremely severe, he has developed wounds of the legs with weeping of fluid due to the swelling, she reports that the patient is frequently non-compliant with medications and medical management, he lives at home with his son and daughter in law, he denies chest pain, shortness of breath, fever/chills, cough, sputum, in the ED, he was tachycardic but hemodynamically stable, afebrile, saturating well on room air, labs were significant for elevated BNP, elevated creatinine, imaging showed moderate left pleural effusion, patient was admitted to general medicine for further management  (21 Oct 2021 07:06)  Patient has history of diabetes, A1C 6.4%, on oral meds at home, had recent hypoglycemic episodes, no polyuria polydipsia. Patient follows up with PCP for diabetes management.  Endo was consulted for glycemic control.    PAST MEDICAL & SURGICAL HISTORY:  Hypertension    Diabetes Mellitus, Type 2    Hypercholesterolemia    Parkinson disease    CAD (coronary artery disease)  s/p 1 stent in 2010 and CABG 2019    S/P CABG (coronary artery bypass graft)  2019    S/P hip replacement, right  2016    Anxiety    Depression, major        FAMILY HISTORY:  Family history of hypertension    Family history of malaria    Family history of pulmonary fibrosis (Sibling)        Social History:    Outpatient Medications:    MEDICATIONS  (STANDING):  ascorbic acid 500 milliGRAM(s) Oral daily  aspirin 325 milliGRAM(s) Oral daily  atorvastatin 80 milliGRAM(s) Oral at bedtime  calcium acetate 667 milliGRAM(s) Oral three times a day with meals  carbidopa/levodopa  25/100 2 Tablet(s) Oral three times a day  cholecalciferol 1000 Unit(s) Oral daily  dextrose 40% Gel 15 Gram(s) Oral once  dextrose 5%. 1000 milliLiter(s) (50 mL/Hr) IV Continuous <Continuous>  dextrose 5%. 1000 milliLiter(s) (100 mL/Hr) IV Continuous <Continuous>  dextrose 50% Injectable 25 Gram(s) IV Push once  dextrose 50% Injectable 12.5 Gram(s) IV Push once  dextrose 50% Injectable 25 Gram(s) IV Push once  folic acid 1 milliGRAM(s) Oral daily  furosemide   Injectable 60 milliGRAM(s) IV Push two times a day  glucagon  Injectable 1 milliGRAM(s) IntraMuscular once  heparin  Infusion.  Unit(s)/Hr (19 mL/Hr) IV Continuous <Continuous>  insulin glargine Injectable (LANTUS) 11 Unit(s) SubCutaneous at bedtime  insulin lispro (ADMELOG) corrective regimen sliding scale   SubCutaneous three times a day before meals  insulin lispro (ADMELOG) corrective regimen sliding scale   SubCutaneous at bedtime  metoprolol tartrate 25 milliGRAM(s) Oral two times a day  Nephro-celeste 1 Tablet(s) Oral daily  trihexyphenidyl 2 milliGRAM(s) Oral three times a day    MEDICATIONS  (PRN):  heparin   Injectable 9000 Unit(s) IV Push every 6 hours PRN For aPTT less than 40  heparin   Injectable 4000 Unit(s) IV Push every 6 hours PRN For aPTT between 40 - 57      Allergies    adhesives (Rash)  latex (Urticaria)  No Known Drug Allergies    Intolerances      Review of Systems:  Constitutional: No fever, no chills  Eyes: No blurry vision  Neuro: No tremors  HEENT: No pain, no neck swelling  Cardiovascular: No chest pain, no palpitations  Respiratory: Has SOB, no cough  GI: No nausea, vomiting, abdominal pain  : No dysuria  Skin: no rash  MSK: Has leg swelling.  Psych: no depression  Endocrine: no polyuria, polydipsia    ALL OTHER SYSTEMS REVIEWED AND NEGATIVE    UNABLE TO OBTAIN    PHYSICAL EXAM:  VITALS: T(C): 36.4 (10-22-21 @ 11:11)  T(F): 97.6 (10-22-21 @ 11:11), Max: 97.9 (10-21-21 @ 19:30)  HR: 81 (10-22-21 @ 11:11) (81 - 99)  BP: 151/67 (10-22-21 @ 11:11) (116/54 - 151/67)  RR:  (18 - 18)  SpO2:  (95% - 99%)  Wt(kg): --  GENERAL: NAD, well-groomed, well-developed  EYES: No proptosis, no lid lag  HEENT:  Atraumatic, Normocephalic  THYROID: Normal size, no palpable nodules  RESPIRATORY: Clear to auscultation bilaterally; No rales, rhonchi, wheezing  CARDIOVASCULAR: Si S2, No murmurs;  GI: Soft, non distended, normal bowel sounds  SKIN: Dry, intact, No rashes or lesions  MUSCULOSKELETAL: Has BL lower extremity edema.  NEURO:  no tremor, sensation decreased in feet BL,    POCT Blood Glucose.: 101 mg/dL (10-22-21 @ 11:30)  POCT Blood Glucose.: 150 mg/dL (10-22-21 @ 07:42)  POCT Blood Glucose.: 51 mg/dL (10-22-21 @ 07:13)  POCT Blood Glucose.: 50 mg/dL (10-22-21 @ 07:12)  POCT Blood Glucose.: 159 mg/dL (10-21-21 @ 21:43)  POCT Blood Glucose.: 124 mg/dL (10-21-21 @ 16:53)  POCT Blood Glucose.: 137 mg/dL (10-21-21 @ 12:34)  POCT Blood Glucose.: 121 mg/dL (10-21-21 @ 08:48)  POCT Blood Glucose.: 130 mg/dL (10-21-21 @ 08:23)  POCT Blood Glucose.: 71 mg/dL (10-21-21 @ 07:42)  POCT Blood Glucose.: 44 mg/dL (10-21-21 @ 07:21)                            7.6    7.87  )-----------( 198      ( 22 Oct 2021 09:30 )             24.8       10-22    135  |  102  |  98<H>  ----------------------------<  113<H>  4.4   |  14<L>  |  6.17<H>    EGFR if : 10<L>  EGFR if non : 8<L>    Ca    8.8      10-22  Mg     2.1     10-22  Phos  7.4     10-22    TPro  6.7  /  Alb  2.9<L>  /  TBili  0.2  /  DBili  x   /  AST  6<L>  /  ALT  <5<L>  /  AlkPhos  82  10-22      Thyroid Function Tests:              Radiology:                HPI:  70yo M w/ PMHx of CAD (s/p CABG 2019), CKD (unknown stage), DM2, Parkinson's Disease, HTN, depression presents with bilateral leg swelling, patient's daughter in-law at bedside Yoly Quintero ([address] [place] [address] Nurse) provides the history, the daughter reports the patient is a poor historian, unable to remember having conversations with others and likely cannot weigh risk/benefits of medical conditions, she reports that he has had bilateral lower extremity swelling for the past few months, but over the past 2 weeks it has significantly worsened, he has further difficulty ambulating due to swelling, his outpatient provider attempted to manage with 20mg lasix and then increased to 40mg lasix but the patient never took the increased dose, his symptoms ar persistent throughout the day and are extremely severe, he has developed wounds of the legs with weeping of fluid due to the swelling, she reports that the patient is frequently non-compliant with medications and medical management, he lives at home with his son and daughter in law, he denies chest pain, shortness of breath, fever/chills, cough, sputum, in the ED, he was tachycardic but hemodynamically stable, afebrile, saturating well on room air, labs were significant for elevated BNP, elevated creatinine, imaging showed moderate left pleural effusion, patient was admitted to general medicine for further management  (21 Oct 2021 07:06)  Patient has history of diabetes, A1C 6.4%, on oral meds at home, had recent hypoglycemic episodes, no polyuria polydipsia. Patient follows up with PCP for diabetes management.  Endo was consulted for glycemic control.    PAST MEDICAL & SURGICAL HISTORY:  Hypertension    Diabetes Mellitus, Type 2    Hypercholesterolemia    Parkinson disease    CAD (coronary artery disease)  s/p 1 stent in 2010 and CABG 2019    S/P CABG (coronary artery bypass graft)  2019    S/P hip replacement, right  2016    Anxiety    Depression, major        FAMILY HISTORY:  Family history of hypertension    Family history of malaria    Family history of pulmonary fibrosis (Sibling)        Social History:    Outpatient Medications:    MEDICATIONS  (STANDING):  ascorbic acid 500 milliGRAM(s) Oral daily  aspirin 325 milliGRAM(s) Oral daily  atorvastatin 80 milliGRAM(s) Oral at bedtime  calcium acetate 667 milliGRAM(s) Oral three times a day with meals  carbidopa/levodopa  25/100 2 Tablet(s) Oral three times a day  cholecalciferol 1000 Unit(s) Oral daily  dextrose 40% Gel 15 Gram(s) Oral once  dextrose 5%. 1000 milliLiter(s) (50 mL/Hr) IV Continuous <Continuous>  dextrose 5%. 1000 milliLiter(s) (100 mL/Hr) IV Continuous <Continuous>  dextrose 50% Injectable 25 Gram(s) IV Push once  dextrose 50% Injectable 12.5 Gram(s) IV Push once  dextrose 50% Injectable 25 Gram(s) IV Push once  folic acid 1 milliGRAM(s) Oral daily  furosemide   Injectable 60 milliGRAM(s) IV Push two times a day  glucagon  Injectable 1 milliGRAM(s) IntraMuscular once  heparin  Infusion.  Unit(s)/Hr (19 mL/Hr) IV Continuous <Continuous>  insulin glargine Injectable (LANTUS) 11 Unit(s) SubCutaneous at bedtime  insulin lispro (ADMELOG) corrective regimen sliding scale   SubCutaneous three times a day before meals  insulin lispro (ADMELOG) corrective regimen sliding scale   SubCutaneous at bedtime  metoprolol tartrate 25 milliGRAM(s) Oral two times a day  Nephro-celeste 1 Tablet(s) Oral daily  trihexyphenidyl 2 milliGRAM(s) Oral three times a day    MEDICATIONS  (PRN):  heparin   Injectable 9000 Unit(s) IV Push every 6 hours PRN For aPTT less than 40  heparin   Injectable 4000 Unit(s) IV Push every 6 hours PRN For aPTT between 40 - 57      Allergies    adhesives (Rash)  latex (Urticaria)  No Known Drug Allergies    Intolerances      Review of Systems:  Constitutional: No fever, no chills  Eyes: No blurry vision  Neuro: No tremors  HEENT: No pain, no neck swelling  Cardiovascular: No chest pain, no palpitations  Respiratory: Has SOB, no cough  GI: No nausea, vomiting, abdominal pain  : No dysuria  Skin: no rash  MSK: Has leg swelling.  Psych: no depression  Endocrine: no polyuria, polydipsia    ALL OTHER SYSTEMS REVIEWED AND NEGATIVE    UNABLE TO OBTAIN    PHYSICAL EXAM:  VITALS: T(C): 36.4 (10-22-21 @ 11:11)  T(F): 97.6 (10-22-21 @ 11:11), Max: 97.9 (10-21-21 @ 19:30)  HR: 81 (10-22-21 @ 11:11) (81 - 99)  BP: 151/67 (10-22-21 @ 11:11) (116/54 - 151/67)  RR:  (18 - 18)  SpO2:  (95% - 99%)  Wt(kg): --  GENERAL: NAD, well-groomed, well-developed  EYES: No proptosis, no lid lag  HEENT:  Atraumatic, Normocephalic  THYROID: Normal size, no palpable nodules  RESPIRATORY: Clear to auscultation bilaterally; No rales, rhonchi, wheezing  CARDIOVASCULAR: Si S2, No murmurs;  GI: Soft, non distended, normal bowel sounds  SKIN: Dry, intact, No rashes or lesions  MUSCULOSKELETAL: Has BL lower extremity edema.  NEURO:  no tremor, sensation decreased in feet BL,    POCT Blood Glucose.: 101 mg/dL (10-22-21 @ 11:30)  POCT Blood Glucose.: 150 mg/dL (10-22-21 @ 07:42)  POCT Blood Glucose.: 51 mg/dL (10-22-21 @ 07:13)  POCT Blood Glucose.: 50 mg/dL (10-22-21 @ 07:12)  POCT Blood Glucose.: 159 mg/dL (10-21-21 @ 21:43)  POCT Blood Glucose.: 124 mg/dL (10-21-21 @ 16:53)  POCT Blood Glucose.: 137 mg/dL (10-21-21 @ 12:34)  POCT Blood Glucose.: 121 mg/dL (10-21-21 @ 08:48)  POCT Blood Glucose.: 130 mg/dL (10-21-21 @ 08:23)  POCT Blood Glucose.: 71 mg/dL (10-21-21 @ 07:42)  POCT Blood Glucose.: 44 mg/dL (10-21-21 @ 07:21)                            7.6    7.87  )-----------( 198      ( 22 Oct 2021 09:30 )             24.8       10-22    135  |  102  |  98<H>  ----------------------------<  113<H>  4.4   |  14<L>  |  6.17<H>    EGFR if : 10<L>  EGFR if non : 8<L>    Ca    8.8      10-22  Mg     2.1     10-22  Phos  7.4     10-22    TPro  6.7  /  Alb  2.9<L>  /  TBili  0.2  /  DBili  x   /  AST  6<L>  /  ALT  <5<L>  /  AlkPhos  82  10-22      Thyroid Function Tests:              Radiology:

## 2021-10-22 NOTE — PROGRESS NOTE ADULT - SUBJECTIVE AND OBJECTIVE BOX
Patient is a 71y old  Male who presents with a chief complaint of leg swelling (22 Oct 2021 12:42)      INTERVAL HPI/OVERNIGHT EVENTS: noted  pt seen and examined this am   events noted  feels well      Vital Signs Last 24 Hrs  T(C): 36.7 (22 Oct 2021 20:03), Max: 36.7 (22 Oct 2021 20:03)  T(F): 98 (22 Oct 2021 20:03), Max: 98 (22 Oct 2021 20:03)  HR: 59 (22 Oct 2021 20:03) (59 - 94)  BP: 118/64 (22 Oct 2021 20:03) (118/64 - 164/73)  BP(mean): --  RR: 18 (22 Oct 2021 20:03) (18 - 18)  SpO2: 97% (22 Oct 2021 20:03) (95% - 99%)    ascorbic acid 500 milliGRAM(s) Oral daily  aspirin 325 milliGRAM(s) Oral daily  atorvastatin 80 milliGRAM(s) Oral at bedtime  calcium acetate 667 milliGRAM(s) Oral three times a day with meals  carbidopa/levodopa  25/100 2 Tablet(s) Oral three times a day  cholecalciferol 1000 Unit(s) Oral daily  dextrose 40% Gel 15 Gram(s) Oral once  dextrose 5%. 1000 milliLiter(s) IV Continuous <Continuous>  dextrose 5%. 1000 milliLiter(s) IV Continuous <Continuous>  dextrose 50% Injectable 25 Gram(s) IV Push once  dextrose 50% Injectable 12.5 Gram(s) IV Push once  dextrose 50% Injectable 25 Gram(s) IV Push once  folic acid 1 milliGRAM(s) Oral daily  furosemide   Injectable 60 milliGRAM(s) IV Push two times a day  glucagon  Injectable 1 milliGRAM(s) IntraMuscular once  heparin   Injectable 4000 Unit(s) IV Push every 6 hours PRN  heparin   Injectable 9000 Unit(s) IV Push every 6 hours PRN  heparin  Infusion.  Unit(s)/Hr IV Continuous <Continuous>  insulin glargine Injectable (LANTUS) 11 Unit(s) SubCutaneous at bedtime  insulin lispro (ADMELOG) corrective regimen sliding scale   SubCutaneous three times a day before meals  insulin lispro (ADMELOG) corrective regimen sliding scale   SubCutaneous at bedtime  metoprolol tartrate 25 milliGRAM(s) Oral two times a day  Nephro-celeste 1 Tablet(s) Oral daily  trihexyphenidyl 2 milliGRAM(s) Oral three times a day      PHYSICAL EXAM:  GENERAL: NAD,   EYES: conjunctiva and sclera clear  ENMT: Moist mucous membranes  NECK: Supple, No JVD, Normal thyroid  CHEST/LUNG: non labored, cta b/l  HEART: Regular rate and rhythm; No murmurs, rubs, or gallops  ABDOMEN: Soft, Nontender, Nondistended; Bowel sounds present  EXTREMITIES:  3+  edema  LYMPH: No lymphadenopathy noted  SKIN: No rashes or lesions    Consultant(s) Notes Reviewed:  [x ] YES  [ ] NO  Care Discussed with Consultants/Other Providers [ x] YES  [ ] NO    LABS:                        8.0    8.76  )-----------( 197      ( 22 Oct 2021 17:54 )             26.0     10-    135  |  102  |  98<H>  ----------------------------<  113<H>  4.4   |  14<L>  |  6.17<H>    Ca    8.8      22 Oct 2021 09:26  Phos  7.4     10-  Mg     2.1     10-22    TPro  6.7  /  Alb  2.9<L>  /  TBili  0.2  /  DBili  x   /  AST  6<L>  /  ALT  <5<L>  /  AlkPhos  82  10-    PT/INR - ( 22 Oct 2021 09:21 )   PT: 13.7 sec;   INR: 1.15 ratio         PTT - ( 22 Oct 2021 17:54 )  PTT:>200.0 sec  Urinalysis Basic - ( 21 Oct 2021 14:51 )    Color: Light Yellow / Appearance: Clear / S.011 / pH: x  Gluc: x / Ketone: Negative  / Bili: Negative / Urobili: Negative   Blood: x / Protein: 100 / Nitrite: Negative   Leuk Esterase: Negative / RBC: 2 /hpf / WBC 1 /HPF   Sq Epi: x / Non Sq Epi: 0 /hpf / Bacteria: Negative      CAPILLARY BLOOD GLUCOSE      POCT Blood Glucose.: 120 mg/dL (22 Oct 2021 21:15)  POCT Blood Glucose.: 107 mg/dL (22 Oct 2021 16:25)  POCT Blood Glucose.: 101 mg/dL (22 Oct 2021 11:30)  POCT Blood Glucose.: 150 mg/dL (22 Oct 2021 07:42)  POCT Blood Glucose.: 51 mg/dL (22 Oct 2021 07:13)  POCT Blood Glucose.: 50 mg/dL (22 Oct 2021 07:12)        Urinalysis Basic - ( 21 Oct 2021 14:51 )    Color: Light Yellow / Appearance: Clear / S.011 / pH: x  Gluc: x / Ketone: Negative  / Bili: Negative / Urobili: Negative   Blood: x / Protein: 100 / Nitrite: Negative   Leuk Esterase: Negative / RBC: 2 /hpf / WBC 1 /HPF   Sq Epi: x / Non Sq Epi: 0 /hpf / Bacteria: Negative        Culture - Blood (collected 21 Oct 2021 06:11)  Source: .Blood Blood-Peripheral  Gram Stain (22 Oct 2021 19:06):    Growth in aerobic bottle: Gram positive cocci in pairs  Preliminary Report (22 Oct 2021 19:06):    Growth in aerobic bottle: Gram positive cocci in pairs    ***Blood Panel PCR results on this specimen are available    approximately 3 hours after the Gram stain result.***    Gram stain, PCR, and/or culture results may not always    correspond due to difference in methodologies.    ************************************************************    This PCR assay was performed by multiplex PCR. This    Assay tests for 66 bacterial and resistance gene targets.    Please refer to the [facility] test directory    at [email or web address] for details.  Organism: Blood Culture PCR (22 Oct 2021 21:20)  Organism: Blood Culture PCR (22 Oct 2021 21:20)    Culture - Blood (collected 21 Oct 2021 06:11)  Source: .Blood Blood-Peripheral  Preliminary Report (22 Oct 2021 07:02):    No growth to date.        RADIOLOGY & ADDITIONAL TESTS:    Imaging Personally Reviewed:  [x ] YES  [ ] NO

## 2021-10-22 NOTE — DIETITIAN INITIAL EVALUATION ADULT. - OTHER INFO
Denies nausea/vomit :  Denies difficulty chewing /swallow :  Denies diarrhea/constipation:  Last BM :  NKFA  Ht:  Ht taken from   Dosing ht:  Dosing wt:  Ht used for BMI  Wt used for BMI  Education Provided : pt was educated on the importance of supplements to increase calorie and protein intake in light of RD's nutritional findings.   pressure injury: nausea and reflux after taking meds at times, denies vomit  Denies difficulty chewing, has difficulty swallowing meats and pills at times.   Denies diarrhea/constipation:  Last BM : yesterday  NKFA   Ht: 71"  Ht taken from pt  Dosing ht: 180.3cm  Dosing wt: 106.6kg  Ht used for BMI 71"  Wt used for BMI 214pounds-current flow sheet weight  Education Provided : pt was educated on the importance of supplements to increase calorie and protein intake in light of RD's nutritional findings. Heart Healthy Consistent Carbohydrate Nutrition Therapy, Phosphorous content of foods. pt reports understanding DASH  pressure injury: right buttock-suspected DDTI

## 2021-10-22 NOTE — DIETITIAN INITIAL EVALUATION ADULT. - ORAL INTAKE PTA/DIET HISTORY
pt does not eat structured meals at home, he eats what is available in the refrigerator which usually consists of salads and cold cut sandwiches. he consumes Glucerna shakes at times.

## 2021-10-22 NOTE — DIETITIAN INITIAL EVALUATION ADULT. - DIET TYPE
DASH/TLC (sodium and cholesterol restricted diet)/no concentrated phosphorus/1500ml/consistent carbohydrate (evening snack)

## 2021-10-22 NOTE — CONSULT NOTE ADULT - ASSESSMENT
Assessment  DMT2: 71y Male with DM T2 with hyperglycemia, A1C 6.4%, was on insulin at home, now on basal insulin and coverage, Lantus dose cut in 1/2 s/p hypoglycemic episode this morning, FS now improving, patient is not eating much 2/2 swallowing difficulties, RD eval reviewed.  CHF: on medications, stable, monitored.  HTN: Controlled,  on antihypertensive medications.  Parkinsons: on meds, monitored.  CKD: Monitor labs/BMP      Kelsie Reece MD  Cell: [phone]  Office: [phone]     Assessment  DMT2: 71y Male with DM T2 with hyperglycemia, A1C 6.4%, was on insulin at home, now on basal insulin and coverage, Lantus dose cut in 1/2 s/p hypoglycemic  episode this morning, FS now improving, patient is not eating much 2/2 swallowing difficulties, RD eval reviewed.  CHF: on medications, stable, monitored.  HTN: Controlled,  on antihypertensive medications.  Parkinsons: on meds, monitored.  CKD: Monitor labs/BMP      Kelsie Reece MD  Cell: [phone]  Office: [phone]

## 2021-10-22 NOTE — DIETITIAN INITIAL EVALUATION ADULT. - PERTINENT MEDS FT
MEDICATIONS  (STANDING):  aspirin 325 milliGRAM(s) Oral daily  atorvastatin 80 milliGRAM(s) Oral at bedtime  calcium acetate 667 milliGRAM(s) Oral three times a day with meals  carbidopa/levodopa  25/100 2 Tablet(s) Oral three times a day  cholecalciferol 1000 Unit(s) Oral daily  dextrose 40% Gel 15 Gram(s) Oral once  dextrose 5%. 1000 milliLiter(s) (50 mL/Hr) IV Continuous <Continuous>  dextrose 5%. 1000 milliLiter(s) (100 mL/Hr) IV Continuous <Continuous>  dextrose 50% Injectable 25 Gram(s) IV Push once  dextrose 50% Injectable 12.5 Gram(s) IV Push once  dextrose 50% Injectable 25 Gram(s) IV Push once  folic acid 1 milliGRAM(s) Oral daily  furosemide   Injectable 60 milliGRAM(s) IV Push two times a day  glucagon  Injectable 1 milliGRAM(s) IntraMuscular once  heparin   Injectable 9000 Unit(s) IV Push once  heparin  Infusion.  Unit(s)/Hr (19 mL/Hr) IV Continuous <Continuous>  insulin glargine Injectable (LANTUS) 11 Unit(s) SubCutaneous at bedtime  insulin lispro (ADMELOG) corrective regimen sliding scale   SubCutaneous three times a day before meals  insulin lispro (ADMELOG) corrective regimen sliding scale   SubCutaneous at bedtime  metoprolol tartrate 25 milliGRAM(s) Oral two times a day  trihexyphenidyl 2 milliGRAM(s) Oral three times a day    MEDICATIONS  (PRN):  heparin   Injectable 9000 Unit(s) IV Push every 6 hours PRN For aPTT less than 40  heparin   Injectable 4000 Unit(s) IV Push every 6 hours PRN For aPTT between 40 - 57

## 2021-10-22 NOTE — DIETITIAN INITIAL EVALUATION ADULT. - PERTINENT LABORATORY DATA
10-22 @ 09:26: Na 135, BUN 98<H>, Cr 6.17<H>, <H>, K+ 4.4, Phos 7.4<H>, Mg 2.1, Alk Phos 82, ALT/SGPT <5<L>, AST/SGOT 6<L>, HbA1c --

## 2021-10-22 NOTE — PROGRESS NOTE ADULT - PROBLEM SELECTOR PLAN 2
rate controlled  cont lopressor  heparin gtt Information: Selecting Yes will display possible errors in your note based on the variables you have selected. This validation is only offered as a suggestion for you. PLEASE NOTE THAT THE VALIDATION TEXT WILL BE REMOVED WHEN YOU FINALIZE YOUR NOTE. IF YOU WANT TO FAX A PRELIMINARY NOTE YOU WILL NEED TO TOGGLE THIS TO 'NO' IF YOU DO NOT WANT IT IN YOUR FAXED NOTE.

## 2021-10-22 NOTE — PROGRESS NOTE ADULT - PROBLEM SELECTOR PLAN 3
-insulin sliding scale  -diabetic diet  hba1c 6.4  hypoglycemic episodes  endo cs appreciated  fu recs: lantus decreased  monitor BS closely

## 2021-10-22 NOTE — PROGRESS NOTE ADULT - SUBJECTIVE AND OBJECTIVE BOX
NEPHROLOGY-NS[facility] [phone]        Patient seen and examined in bed.  He was about the same  Javier to gravity at present         MEDICATIONS  (STANDING):  aspirin 325 milliGRAM(s) Oral daily  atorvastatin 80 milliGRAM(s) Oral at bedtime  calcium acetate 667 milliGRAM(s) Oral three times a day with meals  carbidopa/levodopa  25/100 2 Tablet(s) Oral three times a day  cholecalciferol 1000 Unit(s) Oral daily  dextrose 40% Gel 15 Gram(s) Oral once  dextrose 5%. 1000 milliLiter(s) (50 mL/Hr) IV Continuous <Continuous>  dextrose 5%. 1000 milliLiter(s) (100 mL/Hr) IV Continuous <Continuous>  dextrose 50% Injectable 25 Gram(s) IV Push once  dextrose 50% Injectable 12.5 Gram(s) IV Push once  dextrose 50% Injectable 25 Gram(s) IV Push once  folic acid 1 milliGRAM(s) Oral daily  furosemide   Injectable 60 milliGRAM(s) IV Push two times a day  glucagon  Injectable 1 milliGRAM(s) IntraMuscular once  heparin   Injectable 9000 Unit(s) IV Push once  heparin  Infusion.  Unit(s)/Hr (19 mL/Hr) IV Continuous <Continuous>  insulin glargine Injectable (LANTUS) 11 Unit(s) SubCutaneous at bedtime  insulin lispro (ADMELOG) corrective regimen sliding scale   SubCutaneous three times a day before meals  insulin lispro (ADMELOG) corrective regimen sliding scale   SubCutaneous at bedtime  metoprolol tartrate 25 milliGRAM(s) Oral two times a day  trihexyphenidyl 2 milliGRAM(s) Oral three times a day      VITAL:  T(C): , Max: 36.7 (10-21-21 @ 11:18)  T(F): , Max: 98.1 (10-21-21 @ 11:18)  HR: 94 (10-22-21 @ 08:50)  BP: 119/70 (10-22-21 @ 08:50)  BP(mean): --  RR: 18 (10-22-21 @ 08:50)  SpO2: 99% (10-22-21 @ 08:50)  Wt(kg): --    I and O's:        PHYSICAL EXAM:    Constitutional: NAD  Neck:  No JVD  Respiratory: CTAB/L  Cardiovascular: S1 and S2  Gastrointestinal: BS+, soft, NT/ND  Extremities: No peripheral edema  Neurological: A/O x 3, no focal deficits  Psychiatric: Normal mood, normal affect  : +  Javier  Skin: No rashes  Access: Not applicable    LABS:                        7.8    10.92 )-----------( 235      ( 21 Oct 2021 07:14 )             25.0     10    136  |  103  |  101<H>  ----------------------------<  41<LL>  4.0   |  14<L>  |  6.29<H>    Ca    9.2      21 Oct 2021 07:14  Phos  7.3     10-21  Mg     2.1     10-21    TPro  6.8  /  Alb  x   /  TBili  x   /  DBili  x   /  AST  x   /  ALT  x   /  AlkPhos  x   10-22          Urine Studies:  Urinalysis Basic - ( 21 Oct 2021 14:51 )    Color: Light Yellow / Appearance: Clear / S.011 / pH: x  Gluc: x / Ketone: Negative  / Bili: Negative / Urobili: Negative   Blood: x / Protein: 100 / Nitrite: Negative   Leuk Esterase: Negative / RBC: 2 /hpf / WBC 1 /HPF   Sq Epi: x / Non Sq Epi: 0 /hpf / Bacteria: Negative      Creatinine, Random Urine: 36 mg/dL (10-21 @ 14:51)  Protein/Creatinine Ratio Calculation: 3.7 Ratio (10-21 @ 14:51)        RADIOLOGY & ADDITIONAL STUDIES:

## 2021-10-22 NOTE — DIETITIAN INITIAL EVALUATION ADULT. - ENERGY INTAKE
varies depending upon what is served. encouraged pt to call down for selection if they are not taken by a diet tech.

## 2021-10-22 NOTE — CONSULT NOTE ADULT - SUBJECTIVE AND OBJECTIVE BOX
[facility] Cardiology Consult  _________________________    Patient is a 71y old  Male who presents with a chief complaint of leg swelling (21 Oct 2021 16:44)      HPI:  ****History obtained from chart as patient unsure why he is in the hospital*****  70yo M w/ PMHx of CAD (s/p CABG ), CKD (unknown stage), DM2, Parkinson's Disease, HTN, depression presents with bilateral leg swelling, patient's daughter in-law at bedside Yoly Quintero (4 [facility] Nurse) provides the history, the daughter reports the patient is a poor historian, unable to remember having conversations with others and likely cannot weigh risk/benefits of medical conditions, she reports that he has had bilateral lower extremity swelling for the past few months, but over the past 2 weeks it has significantly worsened, he has further difficulty ambulating due to swelling, his outpatient provider attempted to manage with 20mg lasix and then increased to 40mg lasix but the patient never took the increased dose, his symptoms ar persistent throughout the day and are extremely severe, he has developed wounds of the legs with weeping of fluid due to the swelling, she reports that the patient is frequently non-compliant with medications and medical management, he lives at home with his son and daughter in law, he denies chest pain, shortness of breath, fever/chills, cough, sputum, in the ED, he was tachycardic but hemodynamically stable, afebrile, saturating well on room air, labs were significant for elevated BNP, elevated creatinine, imaging showed moderate left pleural effusion, patient was admitted to general medicine for further management  (21 Oct 2021 07:06)      PAST MEDICAL & SURGICAL HISTORY:  Hypertension    Diabetes Mellitus, Type 2    Hypercholesterolemia    Parkinson disease    CAD (coronary artery disease)  s/p 1 stent in  and CABG 2019    S/P CABG (coronary artery bypass graft)  2019    S/P hip replacement, right  2016    Anxiety    Depression, major        MEDICATIONS  (STANDING):  aspirin 325 milliGRAM(s) Oral daily  atorvastatin 80 milliGRAM(s) Oral at bedtime  calcium acetate 667 milliGRAM(s) Oral three times a day with meals  carbidopa/levodopa  25/100 2 Tablet(s) Oral three times a day  cholecalciferol 1000 Unit(s) Oral daily  dextrose 40% Gel 15 Gram(s) Oral once  dextrose 5%. 1000 milliLiter(s) (50 mL/Hr) IV Continuous <Continuous>  dextrose 5%. 1000 milliLiter(s) (100 mL/Hr) IV Continuous <Continuous>  dextrose 50% Injectable 25 Gram(s) IV Push once  dextrose 50% Injectable 12.5 Gram(s) IV Push once  dextrose 50% Injectable 25 Gram(s) IV Push once  folic acid 1 milliGRAM(s) Oral daily  furosemide   Injectable 60 milliGRAM(s) IV Push two times a day  glucagon  Injectable 1 milliGRAM(s) IntraMuscular once  heparin   Injectable 9000 Unit(s) IV Push once  heparin  Infusion.  Unit(s)/Hr (19 mL/Hr) IV Continuous <Continuous>  insulin glargine Injectable (LANTUS) 11 Unit(s) SubCutaneous at bedtime  insulin lispro (ADMELOG) corrective regimen sliding scale   SubCutaneous three times a day before meals  insulin lispro (ADMELOG) corrective regimen sliding scale   SubCutaneous at bedtime  metoprolol tartrate 25 milliGRAM(s) Oral two times a day  trihexyphenidyl 2 milliGRAM(s) Oral three times a day    MEDICATIONS  (PRN):  heparin   Injectable 9000 Unit(s) IV Push every 6 hours PRN For aPTT less than 40  heparin   Injectable 4000 Unit(s) IV Push every 6 hours PRN For aPTT between 40 - 57      Allergies    adhesives (Rash)  latex (Urticaria)  No Known Drug Allergies    Intolerances        Social Histroy: Tobacco- , ETOH-, Illicit Drugs-    T(C): 36.6 (10-22-21 @ 04:58), Max: 36.7 (10-21-21 @ 11:18)  HR: 94 (10-22-21 @ 08:50) (81 - 112)  BP: 119/70 (10-22-21 @ 08:50) (109/68 - 148/80)  RR: 18 (10-22-21 @ 08:50) (18 - 19)  SpO2: 99% (10-22-21 @ 08:50) (95% - 99%)  I&O's Summary      Review of Systems:  Constitutional: [ ] Fever [ ] Chills [ ] Fatigue [ ] Weight change   HEENT: [ ] Blurred vision [ ] Eye Pain [ ] Headache [ ] Runny nose [ ] Sore Throat   Respiratory: [ ] Cough [ ] Wheezing [ ] Shortness of breath  Cardiovascular: [ ] Chest Pain [ ] Palpitations [ ] NEWBY [ ] PND [ ] Orthopnea  Gastrointestinal: [ ] Abdominal Pain [ ] Diarrhea [ ] Constipation [ ] Hemorrhoids [ ] Nausea [ ] Vomiting  Genitourinary: [ ] Nocturia [ ] Dysuria [ ] Incontinence  Extremities: [ ] Swelling [ ] Joint Pain  Neurologic: [ ] Focal deficit [ ] Paresthesias [ ] Syncope  Lymphatic: [ ] Swelling [ ] Lymphadenopathy   Skin: [ ] Rash [ ] Ecchymoses [ ] Wounds [ ] Lesions  Psychiatry: [ ] Depression [ ] Suicidal/Homicidal Ideation [ ] Anxiety [ ] Sleep Disturbances  [ ] 10 point review of systems is otherwise negative except as mentioned above            [x ]Unable to obtain complete ROS as patient is unsure about why he is hospitlalized.     PHYSICAL EXAM:  GENERAL: NAD  NECK: Supple  CHEST/LUNG: decreased BS bibasilarly  HEART: S1 S2 normal, RRR,  No murmurs, rubs, or gallops  ABDOMEN: Soft, Nondistended  EXTREMITIES:  +2 LE edema.      LABS:                        7.8    10.92 )-----------( 235      ( 21 Oct 2021 07:14 )             25.0     10-21    136  |  103  |  101<H>  ----------------------------<  41<LL>  4.0   |  14<L>  |  6.29<H>    Ca    9.2      21 Oct 2021 07:14  Phos  7.3     10-  Mg     2.1     10-    TPro  6.8  /  Alb  x   /  TBili  x   /  DBili  x   /  AST  x   /  ALT  x   /  AlkPhos  x   10-22    PT/INR - ( 22 Oct 2021 09:21 )   PT: 13.7 sec;   INR: 1.15 ratio         PTT - ( 22 Oct 2021 09:21 )  PTT:58.4 sec  CARDIAC MARKERS ( 21 Oct 2021 07:14 )  x     / x     / 123 U/L / x     / 5.4 ng/mL          Urinalysis Basic - ( 21 Oct 2021 14:51 )    Color: Light Yellow / Appearance: Clear / S.011 / pH: x  Gluc: x / Ketone: Negative  / Bili: Negative / Urobili: Negative   Blood: x / Protein: 100 / Nitrite: Negative   Leuk Esterase: Negative / RBC: 2 /hpf / WBC 1 /HPF   Sq Epi: x / Non Sq Epi: 0 /hpf / Bacteria: Negative        MEDICATIONS  (STANDING):  aspirin 325 milliGRAM(s) Oral daily  atorvastatin 80 milliGRAM(s) Oral at bedtime  calcium acetate 667 milliGRAM(s) Oral three times a day with meals  carbidopa/levodopa  25/100 2 Tablet(s) Oral three times a day  cholecalciferol 1000 Unit(s) Oral daily  dextrose 40% Gel 15 Gram(s) Oral once  dextrose 5%. 1000 milliLiter(s) (50 mL/Hr) IV Continuous <Continuous>  dextrose 5%. 1000 milliLiter(s) (100 mL/Hr) IV Continuous <Continuous>  dextrose 50% Injectable 25 Gram(s) IV Push once  dextrose 50% Injectable 12.5 Gram(s) IV Push once  dextrose 50% Injectable 25 Gram(s) IV Push once  folic acid 1 milliGRAM(s) Oral daily  furosemide   Injectable 60 milliGRAM(s) IV Push two times a day  glucagon  Injectable 1 milliGRAM(s) IntraMuscular once  heparin   Injectable 9000 Unit(s) IV Push once  heparin  Infusion.  Unit(s)/Hr (19 mL/Hr) IV Continuous <Continuous>  insulin glargine Injectable (LANTUS) 11 Unit(s) SubCutaneous at bedtime  insulin lispro (ADMELOG) corrective regimen sliding scale   SubCutaneous three times a day before meals  insulin lispro (ADMELOG) corrective regimen sliding scale   SubCutaneous at bedtime  metoprolol tartrate 25 milliGRAM(s) Oral two times a day  trihexyphenidyl 2 milliGRAM(s) Oral three times a day    MEDICATIONS  (PRN):  heparin   Injectable 9000 Unit(s) IV Push every 6 hours PRN For aPTT less than 40  heparin   Injectable 4000 Unit(s) IV Push every 6 hours PRN For aPTT between 40 - 57          RADIOLOGY & ADDITIONAL TESTS:    Cardiology testing:  EKG: atrial flutter    Telemetry: aflutter 60-80s

## 2021-10-22 NOTE — CONSULT NOTE ADULT - PROBLEM SELECTOR RECOMMENDATION 5
-  -troponin peaked 94  -aspirin 81 mg daily  -heparin drip  -eventual cardiac cath as noted above  -lipitor as ordered  -tte as above  -lopressor as ordered    [facility]
Monitor labs, Renal FU.

## 2021-10-22 NOTE — PROGRESS NOTE ADULT - PROBLEM SELECTOR PLAN 1
-last echo 2019 showed normal EF with mild diastolic dysfunction  -elevated BNP with lower extremity edema  -will require high doses of lasix secondary to signficant renal disease (no clear CKD diagnosis but had elevated Cr in 2019, likely acute on chronic renal failure now secondary to cardiorenal syndrome)   lasix 60mg IV BID uptitrate as needed, monitor blood pressure  TTE fu  -strict I/O  -fluid restriction    #NSTEMI-troponin s peaked  cards cs appreicated  likely will need cath at a later point once creatinine improves

## 2021-10-22 NOTE — PROGRESS NOTE ADULT - ASSESSMENT
70yo M w/ PMHx of CAD (s/p CABG 2019), CKD (unknown stage), DM2, Parkinson's Disease, HTN, depression presents with bilateral leg swelling  Mild CHF  Acute on chronic renal failure   Hyperphosphatemia  Metabolic acidosis   NSTEMI at present and cards is following     1 -Official renal sono    2 Renal- Advanced renal failure and hopefully there is some reversibility.  He is volume overloaded at present with radiographic evidence of CHF and + JVD; Cont IV lasix  He may need HD on this admission   Quantify proteinuria burden   Phoslo 3 x day     3 [facility] called;  On Lopressor      4 Anemia- [id number] mg iVSS x 1     LE dopplers ordered     -DW daughter in law          [employer]   [phone]

## 2021-10-23 NOTE — CHART NOTE - NSCHARTNOTEFT_GEN_A_CORE
Pt hypoglycemic at 7 am, down to 50's, given 1/2 amp D 50 with improvement to 111. Per nurse, patient asymptomatic. VSS. Ate breakfast. Pt feeling a little nauseated at 8:30.  Pt received lantus 11 night prior.  Made endocrine aware. Will follow up.  Continue to monitor fingersticks.

## 2021-10-23 NOTE — PROGRESS NOTE ADULT - PROBLEM SELECTOR PLAN 3
-insulin sliding scale  -diabetic diet  hba1c 6.4  hypoglycemic episodes  endo cs appreciated  lantus decreased, consider dc  monitor BS closely

## 2021-10-23 NOTE — PROGRESS NOTE ADULT - SUBJECTIVE AND OBJECTIVE BOX
NEPHROLOGY     Patient seen and examined.    MEDICATIONS  (STANDING):  albuterol/ipratropium for Nebulization 3 milliLiter(s) Nebulizer every 6 hours  ascorbic acid 500 milliGRAM(s) Oral daily  atorvastatin 80 milliGRAM(s) Oral at bedtime  calcium acetate 667 milliGRAM(s) Oral three times a day with meals  carbidopa/levodopa  25/100 2 Tablet(s) Oral three times a day  cholecalciferol 1000 Unit(s) Oral daily  dextrose 40% Gel 15 Gram(s) Oral once  dextrose 5%. 1000 milliLiter(s) (50 mL/Hr) IV Continuous <Continuous>  dextrose 5%. 1000 milliLiter(s) (100 mL/Hr) IV Continuous <Continuous>  dextrose 50% Injectable 25 Gram(s) IV Push once  dextrose 50% Injectable 12.5 Gram(s) IV Push once  dextrose 50% Injectable 25 Gram(s) IV Push once  folic acid 1 milliGRAM(s) Oral daily  furosemide   Injectable 60 milliGRAM(s) IV Push two times a day  glucagon  Injectable 1 milliGRAM(s) IntraMuscular once  heparin  Infusion 700 Unit(s)/Hr (7 mL/Hr) IV Continuous <Continuous>  insulin glargine Injectable (LANTUS) 6 Unit(s) SubCutaneous at bedtime  insulin lispro (ADMELOG) corrective regimen sliding scale   SubCutaneous three times a day before meals  insulin lispro (ADMELOG) corrective regimen sliding scale   SubCutaneous at bedtime  metoprolol tartrate 25 milliGRAM(s) Oral two times a day  Nephro-celeste 1 Tablet(s) Oral daily  trihexyphenidyl 2 milliGRAM(s) Oral three times a day    VITALS:  T(C): , Max: 36.8 (10-23-21 @ 04:59)  T(F): , Max: 98.2 (10-23-21 @ 04:59)  HR: 78 (10-23-21 @ 11:01)  BP: 117/54 (10-23-21 @ 11:01)  RR: 18 (10-23-21 @ 11:01)  SpO2: 92% (10-23-21 @ 11:01)    I and O's:    10-22 @ 07:01  -  10-23 @ 07:00  --------------------------------------------------------  IN: 800 mL / OUT: 1400 mL / NET: -600 mL    10-23 @ 07:01  -  10-23 @ 14:25  --------------------------------------------------------  IN: 740 mL / OUT: 0 mL / NET: 740 mL    PHYSICAL EXAM:      LABS:                        7.5    11.10 )-----------( 226      ( 23 Oct 2021 09:03 )             24.2     10-23    133<L>  |  100  |  103<H>  ----------------------------<  82  4.4   |  15<L>  |  6.43<H>    Ca    8.9      23 Oct 2021 09:03  Phos  7.0     10-23  Mg     2.2     10-23    TPro  7.0  /  Alb  3.0<L>  /  TBili  0.2  /  DBili  x   /  AST  6<L>  /  ALT  <5<L>  /  AlkPhos  83  10-23    Urine Studies:  Urinalysis Basic - ( 21 Oct 2021 14:51 )    Color: Light Yellow / Appearance: Clear / S.011 / pH: x  Gluc: x / Ketone: Negative  / Bili: Negative / Urobili: Negative   Blood: x / Protein: 100 / Nitrite: Negative   Leuk Esterase: Negative / RBC: 2 /hpf / WBC 1 /HPF   Sq Epi: x / Non Sq Epi: 0 /hpf / Bacteria: Negative    Creatinine, Random Urine: 36 mg/dL (10-21 @ 14:51)  Protein/Creatinine Ratio Calculation: 3.7 Ratio (10-21 @ 14:51)      RADIOLOGY & ADDITIONAL STUDIES:    EXAM:  US KIDNEY(S)                            PROCEDURE DATE:  10/22/2021            INTERPRETATION:  CLINICAL INFORMATION: Acute kidney injury.    COMPARISON: Multiple prior renal ultrasound exams, most recently 2019. CT abdomen and pelvis 2009.    TECHNIQUE: Sonography of the kidneys and bladder.    FINDINGS:    Right kidney: 9.8 cm. Increased echogenicity. A simple cyst within the upper pole measures 3.3 x 3.1 x 3.6 cm. A cyst within the lower pole with internal echogenic material,some of which appears mobile, likely reflecting a hemorrhagic cyst that measures 4.5 x 4.4 x 4.4 cm. This previously measured 4.7 x 4.4 x 4.4 cm on 2019, and 4.0 x 3.7 x 4.2 cm on the baseline CT exam of 2009. No hydronephrosis or calculi.    Left kidney: 9.5 cm. Increased echogenicity. Scattered small cysts measuring up to 1.6 cm. No hydronephrosis or calculi.    Urinary bladder: Distended with a volume of 530 cc. Javier balloon is not seen within the bladder.    Prostate: Enlarged, protruding into the bladder.    Additional findings: Partially imaged left pleural effusion.    IMPRESSION:    Increased renal echogenicity, suggesting medical renal disease. No hydronephrosis.    Distended urinary bladder with volume of 530 cc. The Foleycatheter balloon is not visualized within the bladder.    Bilateral renal cysts. A complex right lower pole cystic lesion is likely a hemorrhagic cyst, which is not significantly changed in size since the prior exam of 2019 and minimally increased in size since .    Left pleural effusion.    Findings pertaining to bladder distention with nonvisualization of the Javier catheter were discussed between Dr. Rob and NP provider on  [place] on 10/22/2021 at 10:51 AM.    --- End of Report ---    MADIE ROB MD; Resident Radiologist  This document has been electronically signed.  YARELIS LICONA MD; Attending Radiologist  This document has been electronically signed. Oct 22 2021 11:30AM   NEPHROLOGY     Patient seen and examined sitting in chair. Pt reports feeling ok, no complaints of pain, no sob, comfortable on 2L NC, cough noted, currently in no acute distress.     MEDICATIONS  (STANDING):  albuterol/ipratropium for Nebulization 3 milliLiter(s) Nebulizer every 6 hours  ascorbic acid 500 milliGRAM(s) Oral daily  atorvastatin 80 milliGRAM(s) Oral at bedtime  calcium acetate 667 milliGRAM(s) Oral three times a day with meals  carbidopa/levodopa  25/100 2 Tablet(s) Oral three times a day  cholecalciferol 1000 Unit(s) Oral daily  dextrose 40% Gel 15 Gram(s) Oral once  dextrose 5%. 1000 milliLiter(s) (50 mL/Hr) IV Continuous <Continuous>  dextrose 5%. 1000 milliLiter(s) (100 mL/Hr) IV Continuous <Continuous>  dextrose 50% Injectable 25 Gram(s) IV Push once  dextrose 50% Injectable 12.5 Gram(s) IV Push once  dextrose 50% Injectable 25 Gram(s) IV Push once  folic acid 1 milliGRAM(s) Oral daily  furosemide   Injectable 60 milliGRAM(s) IV Push two times a day  glucagon  Injectable 1 milliGRAM(s) IntraMuscular once  heparin  Infusion 700 Unit(s)/Hr (7 mL/Hr) IV Continuous <Continuous>  insulin glargine Injectable (LANTUS) 6 Unit(s) SubCutaneous at bedtime  insulin lispro (ADMELOG) corrective regimen sliding scale   SubCutaneous three times a day before meals  insulin lispro (ADMELOG) corrective regimen sliding scale   SubCutaneous at bedtime  metoprolol tartrate 25 milliGRAM(s) Oral two times a day  Nephro-celeste 1 Tablet(s) Oral daily  trihexyphenidyl 2 milliGRAM(s) Oral three times a day    VITALS:  T(C): , Max: 36.8 (10-23-21 @ 04:59)  T(F): , Max: 98.2 (10-23-21 @ 04:59)  HR: 78 (10-23-21 @ 11:01)  BP: 117/54 (10-23-21 @ 11:01)  RR: 18 (10-23-21 @ 11:01)  SpO2: 92% (10-23-21 @ 11:01)    I and O's:    10-22 @ 07:01  -  10-23 @ 07:00  --------------------------------------------------------  IN: 800 mL / OUT: 1400 mL / NET: -600 mL    10-23 @ 07:01  -  10-23 @ 14:25  --------------------------------------------------------  IN: 740 mL / OUT: 0 mL / NET: 740 mL    PHYSICAL EXAM:  Constitutional: NAD  Neck:  + JVD  Respiratory: diminished at bases  Cardiovascular: S1 and S2  Gastrointestinal: BS+, soft, NT/ND  Extremities: +edema  Neurological: A/O x 3, no focal deficits  Psychiatric: Normal mood, normal affect  : +  Javier  Skin: No rashes    LABS:                        7.5    11.10 )-----------( 226      ( 23 Oct 2021 09:03 )             24.2     10-23    133<L>  |  100  |  103<H>  ----------------------------<  82  4.4   |  15<L>  |  6.43<H>    Ca    8.9      23 Oct 2021 09:03  Phos  7.0     10-  Mg     2.2     10-23    TPro  7.0  /  Alb  3.0<L>  /  TBili  0.2  /  DBili  x   /  AST  6<L>  /  ALT  <5<L>  /  AlkPhos  83  10-23    Urine Studies:  Urinalysis Basic - ( 21 Oct 2021 14:51 )    Color: Light Yellow / Appearance: Clear / S.011 / pH: x  Gluc: x / Ketone: Negative  / Bili: Negative / Urobili: Negative   Blood: x / Protein: 100 / Nitrite: Negative   Leuk Esterase: Negative / RBC: 2 /hpf / WBC 1 /HPF   Sq Epi: x / Non Sq Epi: 0 /hpf / Bacteria: Negative    Creatinine, Random Urine: 36 mg/dL (10-21 @ 14:51)  Protein/Creatinine Ratio Calculation: 3.7 Ratio (10-21 @ 14:51)      RADIOLOGY & ADDITIONAL STUDIES:    EXAM:  US KIDNEY(S)                            PROCEDURE DATE:  10/22/2021            INTERPRETATION:  CLINICAL INFORMATION: Acute kidney injury.    COMPARISON: Multiple prior renal ultrasound exams, most recently 2019. CT abdomen and pelvis 2009.    TECHNIQUE: Sonography of the kidneys and bladder.    FINDINGS:    Right kidney: 9.8 cm. Increased echogenicity. A simple cyst within the upper pole measures 3.3 x 3.1 x 3.6 cm. A cyst within the lower pole with internal echogenic material,some of which appears mobile, likely reflecting a hemorrhagic cyst that measures 4.5 x 4.4 x 4.4 cm. This previously measured 4.7 x 4.4 x 4.4 cm on 2019, and 4.0 x 3.7 x 4.2 cm on the baseline CT exam of 2009. No hydronephrosis or calculi.    Left kidney: 9.5 cm. Increased echogenicity. Scattered small cysts measuring up to 1.6 cm. No hydronephrosis or calculi.    Urinary bladder: Distended with a volume of 530 cc. Javier balloon is not seen within the bladder.    Prostate: Enlarged, protruding into the bladder.    Additional findings: Partially imaged left pleural effusion.    IMPRESSION:    Increased renal echogenicity, suggesting medical renal disease. No hydronephrosis.    Distended urinary bladder with volume of 530 cc. The Foleycatheter balloon is not visualized within the bladder.    Bilateral renal cysts. A complex right lower pole cystic lesion is likely a hemorrhagic cyst, which is not significantly changed in size since the prior exam of 2019 and minimally increased in size since .    Left pleural effusion.    Findings pertaining to bladder distention with nonvisualization of the Javier catheter were discussed between Dr. Rob and NP provider on  on 10/22/2021 at 10:51 AM.    --- End of Report ---    MADIE ROB MD; Resident Radiologist  This document has been electronically signed.  YARELIS LICONA MD; Attending Radiologist  This document has been electronically signed. Oct 22 2021 11:30AM    EXAM:  DUPLEX SCAN EXT VEINS LOWER BI                            PROCEDURE DATE:  10/22/2021            INTERPRETATION:  CLINICAL INFORMATION: Leg swelling and erythema.    COMPARISON: Bilateral lower extremity venous duplex study dated 2019.    TECHNIQUE: Duplex sonography of the BILATERAL LOWER extremity veins with color and spectral Doppler, with and without compression.    FINDINGS:    RIGHT:  Normal compressibility of the RIGHT common femoral, femoral and popliteal veins.  Doppler examination shows normal spontaneous and phasic flow.  No RIGHT calf vein thrombosis is detected.    LEFT:  Normal compressibility of the LEFT common femoral, femoral and popliteal veins.  Doppler examination shows normal spontaneous and phasic flow.  NoLEFT calf vein thrombosis is detected.    IMPRESSION:  No evidence of deep venous thrombosis in either lower extremity.          --- End of Report ---      AKASH PALMA MD; Attending Radiologist  This document has been electronically signed.Oct 22 2021 11:40AM

## 2021-10-23 NOTE — PROGRESS NOTE ADULT - SUBJECTIVE AND OBJECTIVE BOX
Patient is a 71y old  Male who presents with a chief complaint of leg swelling (23 Oct 2021 14:25)      INTERVAL HPI/OVERNIGHT EVENTS: noted  pt seen and examined this am   events noted  c/o HA, nausea  BS low 50s      Vital Signs Last 24 Hrs  T(C): 36.4 (23 Oct 2021 11:01), Max: 36.8 (23 Oct 2021 04:59)  T(F): 97.5 (23 Oct 2021 11:01), Max: 98.2 (23 Oct 2021 04:59)  HR: 82 (23 Oct 2021 17:37) (75 - 92)  BP: 106/67 (23 Oct 2021 17:37) (106/67 - 148/68)  BP(mean): --  RR: 18 (23 Oct 2021 17:37) (18 - 19)  SpO2: 94% (23 Oct 2021 17:37) (88% - 94%)    acetaminophen     Tablet .. 650 milliGRAM(s) Oral every 6 hours PRN  albuterol/ipratropium for Nebulization 3 milliLiter(s) Nebulizer every 6 hours  ascorbic acid 500 milliGRAM(s) Oral daily  atorvastatin 80 milliGRAM(s) Oral at bedtime  calcium acetate 667 milliGRAM(s) Oral three times a day with meals  carbidopa/levodopa  25/100 2 Tablet(s) Oral three times a day  cholecalciferol 1000 Unit(s) Oral daily  dextrose 40% Gel 15 Gram(s) Oral once  dextrose 5%. 1000 milliLiter(s) IV Continuous <Continuous>  dextrose 5%. 1000 milliLiter(s) IV Continuous <Continuous>  dextrose 50% Injectable 25 Gram(s) IV Push once  dextrose 50% Injectable 12.5 Gram(s) IV Push once  dextrose 50% Injectable 25 Gram(s) IV Push once  folic acid 1 milliGRAM(s) Oral daily  furosemide   Injectable 60 milliGRAM(s) IV Push two times a day  glucagon  Injectable 1 milliGRAM(s) IntraMuscular once  guaiFENesin Oral Liquid (Sugar-Free) 200 milliGRAM(s) Oral every 6 hours PRN  heparin  Infusion 500 Unit(s)/Hr IV Continuous <Continuous>  insulin glargine Injectable (LANTUS) 6 Unit(s) SubCutaneous at bedtime  insulin lispro (ADMELOG) corrective regimen sliding scale   SubCutaneous three times a day before meals  insulin lispro (ADMELOG) corrective regimen sliding scale   SubCutaneous at bedtime  metoprolol tartrate 25 milliGRAM(s) Oral two times a day  Nephro-celeste 1 Tablet(s) Oral daily  trihexyphenidyl 2 milliGRAM(s) Oral three times a day      PHYSICAL EXAM:  GENERAL: NAD,   EYES: conjunctiva and sclera clear  ENMT: Moist mucous membranes  NECK: Supple, No JVD, Normal thyroid  CHEST/LUNG: non labored, cta b/l  HEART: Regular rate and rhythm; No murmurs, rubs, or gallops  ABDOMEN: Soft, Nontender, Nondistended; Bowel sounds present  EXTREMITIES:  2+ Peripheral Pulses, No clubbing, cyanosis, or edema  LYMPH: No lymphadenopathy noted  SKIN: No rashes or lesions    Consultant(s) Notes Reviewed:  [x ] YES  [ ] NO  Care Discussed with Consultants/Other Providers [ x] YES  [ ] NO    LABS:                        7.5    11.10 )-----------( 226      ( 23 Oct 2021 09:03 )             24.2     10-23    133<L>  |  100  |  103<H>  ----------------------------<  82  4.4   |  15<L>  |  6.43<H>    Ca    8.9      23 Oct 2021 09:03  Phos  7.0     10-23  Mg     2.2     10-23    TPro  7.0  /  Alb  3.0<L>  /  TBili  0.2  /  DBili  x   /  AST  6<L>  /  ALT  <5<L>  /  AlkPhos  83  10-23    PT/INR - ( 22 Oct 2021 09:21 )   PT: 13.7 sec;   INR: 1.15 ratio         PTT - ( 23 Oct 2021 18:33 )  PTT:>200.0 sec    CAPILLARY BLOOD GLUCOSE      POCT Blood Glucose.: 121 mg/dL (23 Oct 2021 16:25)  POCT Blood Glucose.: 137 mg/dL (23 Oct 2021 11:23)  POCT Blood Glucose.: 94 mg/dL (23 Oct 2021 08:56)  POCT Blood Glucose.: 111 mg/dL (23 Oct 2021 07:56)  POCT Blood Glucose.: 94 mg/dL (23 Oct 2021 07:39)  POCT Blood Glucose.: 50 mg/dL (23 Oct 2021 07:18)  POCT Blood Glucose.: 50 mg/dL (23 Oct 2021 07:17)  POCT Blood Glucose.: 120 mg/dL (22 Oct 2021 21:15)            Culture - Blood (collected 21 Oct 2021 06:11)  Source: .Blood Blood-Peripheral  Gram Stain (22 Oct 2021 19:06):    Growth in aerobic bottle: Gram positive cocci in pairs  Preliminary Report (22 Oct 2021 19:06):    Growth in aerobic bottle: Gram positive cocci in pairs    ***Blood Panel PCR results on this specimen are available    approximately 3 hours after the Gram stain result.***    Gram stain, PCR, and/or culture results may not always    correspond due to difference in methodologies.    ************************************************************    This PCR assay was performed by multiplex PCR. This    Assay tests for 66 bacterial and resistance gene targets.    Please refer to the [facility] test directory    at [email or web address] for details.  Organism: Blood Culture PCR (22 Oct 2021 21:20)  Organism: Blood Culture PCR (22 Oct 2021 21:20)    Culture - Blood (collected 21 Oct 2021 06:11)  Source: .Blood Blood-Peripheral  Preliminary Report (22 Oct 2021 07:02):    No growth to date.        RADIOLOGY & ADDITIONAL TESTS:    Imaging Personally Reviewed:  [x ] YES  [ ] NO

## 2021-10-23 NOTE — PROGRESS NOTE ADULT - PROBLEM SELECTOR PLAN 5
-  - cont heparin gtt.  - the ABBY on CKD is also contributing to the hstroponin levels. Continue to trend.  - cont ASA, statin, BB.  - cont heparin gtt.  - eventual cath as noted.    our team to follow.    Zachery Castillo M.D., [facility]  [phone]

## 2021-10-23 NOTE — PROGRESS NOTE ADULT - SUBJECTIVE AND OBJECTIVE BOX
[facility] Cardiology Progress Note  _______________________________    Pt. seen and examined. Chart reviewed.    Telemetry - Aflutter 50-80's    T(C): 36.8 (10-23-21 @ 04:59), Max: 36.8 (10-23-21 @ 04:59)  HR: 75 (10-23-21 @ 04:59) (59 - 92)  BP: 148/68 (10-23-21 @ 04:59) (118/64 - 164/73)  RR: 19 (10-23-21 @ 04:59) (18 - 19)  SpO2: 94% (10-23-21 @ 04:59) (88% - 97%)  I&O's Summary    22 Oct 2021 07:  -  23 Oct 2021 07:00  --------------------------------------------------------  IN: 800 mL / OUT: 1400 mL / NET: -600 mL    23 Oct 2021 07:  -  23 Oct 2021 10:53  --------------------------------------------------------  IN: 360 mL / OUT: 0 mL / NET: 360 mL        PHYSICAL EXAM:  GENERAL: Alert, NAD.  NECK: + JVD  CHEST/LUNG: Clear to auscultation bilaterally; No wheezes, rales, or rhonchi.  HEART: S1 S2 normal, RRR; No murmurs, rubs, or gallops  ABDOMEN: Soft, Nontender, Nondistended; Bowel sounds present  EXTREMITIES: + b/l LE edema.    LABS:                        7.5    11.10 )-----------( 226      ( 23 Oct 2021 09:03 )             24.2     10    133<L>  |  100  |  103<H>  ----------------------------<  82  4.4   |  15<L>  |  6.43<H>    Ca    8.9      23 Oct 2021 09:03  Phos  7.0     10-23  Mg     2.2     10-23    TPro  7.0  /  Alb  3.0<L>  /  TBili  0.2  /  DBili  x   /  AST  6<L>  /  ALT  <5<L>  /  AlkPhos  83  10-23    PT/INR - ( 22 Oct 2021 09:21 )   PT: 13.7 sec;   INR: 1.15 ratio         PTT - ( 23 Oct 2021 01:53 )  PTT:>200.0 sec  CARDIAC MARKERS ( 21 Oct 2021 07:14 )  x     / x     / 123 U/L / x     / 5.4 ng/mL      Serum Pro-Brain Natriuretic Peptide: 54969 pg/mL (10-23-21 @ 09:03)      Urinalysis Basic - ( 21 Oct 2021 14:51 )    Color: Light Yellow / Appearance: Clear / S.011 / pH: x  Gluc: x / Ketone: Negative  / Bili: Negative / Urobili: Negative   Blood: x / Protein: 100 / Nitrite: Negative   Leuk Esterase: Negative / RBC: 2 /hpf / WBC 1 /HPF   Sq Epi: x / Non Sq Epi: 0 /hpf / Bacteria: Negative        MEDICATIONS  (STANDING):  albuterol/ipratropium for Nebulization 3 milliLiter(s) Nebulizer every 6 hours  ascorbic acid 500 milliGRAM(s) Oral daily  aspirin 325 milliGRAM(s) Oral daily  atorvastatin 80 milliGRAM(s) Oral at bedtime  calcium acetate 667 milliGRAM(s) Oral three times a day with meals  carbidopa/levodopa  25/100 2 Tablet(s) Oral three times a day  cholecalciferol 1000 Unit(s) Oral daily  dextrose 40% Gel 15 Gram(s) Oral once  dextrose 5%. 1000 milliLiter(s) (50 mL/Hr) IV Continuous <Continuous>  dextrose 5%. 1000 milliLiter(s) (100 mL/Hr) IV Continuous <Continuous>  dextrose 50% Injectable 25 Gram(s) IV Push once  dextrose 50% Injectable 12.5 Gram(s) IV Push once  dextrose 50% Injectable 25 Gram(s) IV Push once  folic acid 1 milliGRAM(s) Oral daily  furosemide   Injectable 60 milliGRAM(s) IV Push two times a day  glucagon  Injectable 1 milliGRAM(s) IntraMuscular once  heparin  Infusion.  Unit(s)/Hr (19 mL/Hr) IV Continuous <Continuous>  insulin glargine Injectable (LANTUS) 11 Unit(s) SubCutaneous at bedtime  insulin lispro (ADMELOG) corrective regimen sliding scale   SubCutaneous three times a day before meals  insulin lispro (ADMELOG) corrective regimen sliding scale   SubCutaneous at bedtime  metoprolol tartrate 25 milliGRAM(s) Oral two times a day  Nephro-celeste 1 Tablet(s) Oral daily  trihexyphenidyl 2 milliGRAM(s) Oral three times a day    MEDICATIONS  (PRN):  acetaminophen     Tablet .. 650 milliGRAM(s) Oral every 6 hours PRN Mild Pain (1 - 3)  guaiFENesin Oral Liquid (Sugar-Free) 200 milliGRAM(s) Oral every 6 hours PRN Cough  heparin   Injectable 9000 Unit(s) IV Push every 6 hours PRN For aPTT less than 40  heparin   Injectable 4000 Unit(s) IV Push every 6 hours PRN For aPTT between 40 - 57      RADIOLOGY & ADDITIONAL TESTS:  TTE  1. Calcified trileaflet aortic valve with normal opening.  Peak transaortic valve gradient equals 10 mm Hg, mean  transaortic valve gradient equals 6 mm Hg, aortic valve  velocity time integral equals 40 cm, estimated aortic valve  area equals 2.1 sqcm.  2. Endocardial visualization enhanced with intravenous  injection of Ultrasonic Enhancing Agent (Definity).  moderate to severe segmental left ventricular systolic  dysfunction. There is hypokinesis of anterior wall, septum  and apex. Visual estimate of EF about 35%.  3. Normal right ventricular size with decreased right  ventricular systolic function.  4. Normal tricuspid valve. Mild tricuspid regurgitation.  5. Estimated pulmonary artery systolic pressure equals 24  mm Hg, assuming right atrial pressure equals 8  mm Hg,  consistent with normal pulmonary pressures.  6. Bilateral pleural effusions.   [facility] Cardiology Progress Note  _______________________________    Pt. seen and examined. Chart reviewed. Reports leg swelling has improved. Denies dyspnea or CP.    Telemetry - Aflutter 50-80's    T(C): 36.8 (10-23-21 @ 04:59), Max: 36.8 (10-23-21 @ 04:59)  HR: 75 (10-23-21 @ 04:59) (59 - 92)  BP: 148/68 (10-23-21 @ 04:59) (118/64 - 164/73)  RR: 19 (10-23-21 @ 04:59) (18 - 19)  SpO2: 94% (10-23-21 @ 04:59) (88% - 97%)  I&O's Summary    22 Oct 2021 07:  -  23 Oct 2021 07:00  --------------------------------------------------------  IN: 800 mL / OUT: 1400 mL / NET: -600 mL    23 Oct 2021 07:  -  23 Oct 2021 10:53  --------------------------------------------------------  IN: 360 mL / OUT: 0 mL / NET: 360 mL        PHYSICAL EXAM:  GENERAL: Alert, NAD. + UE Tremor.  NECK: + JVD  CHEST/LUNG: Clear to auscultation bilaterally; No wheezes, rales, or rhonchi.  HEART: S1 S2 normal, RRR; No murmurs, rubs, or gallops  ABDOMEN: Soft, Nontender, Nondistended; Bowel sounds present  EXTREMITIES: + b/l LE edema with stasis changes of LE's.    LABS:                        7.5    11.10 )-----------( 226      ( 23 Oct 2021 09:03 )             24.2     10-23    133<L>  |  100  |  103<H>  ----------------------------<  82  4.4   |  15<L>  |  6.43<H>    Ca    8.9      23 Oct 2021 09:03  Phos  7.0     10-23  Mg     2.2     10-23    TPro  7.0  /  Alb  3.0<L>  /  TBili  0.2  /  DBili  x   /  AST  6<L>  /  ALT  <5<L>  /  AlkPhos  83  10-23    PT/INR - ( 22 Oct 2021 09:21 )   PT: 13.7 sec;   INR: 1.15 ratio         PTT - ( 23 Oct 2021 01:53 )  PTT:>200.0 sec  CARDIAC MARKERS ( 21 Oct 2021 07:14 )  x     / x     / 123 U/L / x     / 5.4 ng/mL      Serum Pro-Brain Natriuretic Peptide: 96087 pg/mL (10-23-21 @ 09:03)      Urinalysis Basic - ( 21 Oct 2021 14:51 )    Color: Light Yellow / Appearance: Clear / S.011 / pH: x  Gluc: x / Ketone: Negative  / Bili: Negative / Urobili: Negative   Blood: x / Protein: 100 / Nitrite: Negative   Leuk Esterase: Negative / RBC: 2 /hpf / WBC 1 /HPF   Sq Epi: x / Non Sq Epi: 0 /hpf / Bacteria: Negative        MEDICATIONS  (STANDING):  albuterol/ipratropium for Nebulization 3 milliLiter(s) Nebulizer every 6 hours  ascorbic acid 500 milliGRAM(s) Oral daily  aspirin 325 milliGRAM(s) Oral daily  atorvastatin 80 milliGRAM(s) Oral at bedtime  calcium acetate 667 milliGRAM(s) Oral three times a day with meals  carbidopa/levodopa  25/100 2 Tablet(s) Oral three times a day  cholecalciferol 1000 Unit(s) Oral daily  dextrose 40% Gel 15 Gram(s) Oral once  dextrose 5%. 1000 milliLiter(s) (50 mL/Hr) IV Continuous <Continuous>  dextrose 5%. 1000 milliLiter(s) (100 mL/Hr) IV Continuous <Continuous>  dextrose 50% Injectable 25 Gram(s) IV Push once  dextrose 50% Injectable 12.5 Gram(s) IV Push once  dextrose 50% Injectable 25 Gram(s) IV Push once  folic acid 1 milliGRAM(s) Oral daily  furosemide   Injectable 60 milliGRAM(s) IV Push two times a day  glucagon  Injectable 1 milliGRAM(s) IntraMuscular once  heparin  Infusion.  Unit(s)/Hr (19 mL/Hr) IV Continuous <Continuous>  insulin glargine Injectable (LANTUS) 11 Unit(s) SubCutaneous at bedtime  insulin lispro (ADMELOG) corrective regimen sliding scale   SubCutaneous three times a day before meals  insulin lispro (ADMELOG) corrective regimen sliding scale   SubCutaneous at bedtime  metoprolol tartrate 25 milliGRAM(s) Oral two times a day  Nephro-celeste 1 Tablet(s) Oral daily  trihexyphenidyl 2 milliGRAM(s) Oral three times a day    MEDICATIONS  (PRN):  acetaminophen     Tablet .. 650 milliGRAM(s) Oral every 6 hours PRN Mild Pain (1 - 3)  guaiFENesin Oral Liquid (Sugar-Free) 200 milliGRAM(s) Oral every 6 hours PRN Cough  heparin   Injectable 9000 Unit(s) IV Push every 6 hours PRN For aPTT less than 40  heparin   Injectable 4000 Unit(s) IV Push every 6 hours PRN For aPTT between 40 - 57      RADIOLOGY & ADDITIONAL TESTS:  TTE  1. Calcified trileaflet aortic valve with normal opening.  Peak transaortic valve gradient equals 10 mm Hg, mean  transaortic valve gradient equals 6 mm Hg, aortic valve  velocity time integral equals 40 cm, estimated aortic valve  area equals 2.1 sqcm.  2. Endocardial visualization enhanced with intravenous  injection of Ultrasonic Enhancing Agent (Definity).  moderate to severe segmental left ventricular systolic  dysfunction. There is hypokinesis of anterior wall, septum  and apex. Visual estimate of EF about 35%.  3. Normal right ventricular size with decreased right  ventricular systolic function.  4. Normal tricuspid valve. Mild tricuspid regurgitation.  5. Estimated pulmonary artery systolic pressure equals 24  mm Hg, assuming right atrial pressure equals 8  mm Hg,  consistent with normal pulmonary pressures.  6. Bilateral pleural effusions.

## 2021-10-23 NOTE — PROGRESS NOTE ADULT - ASSESSMENT
72yo M w/ PMHx of CAD (s/p CABG 2019), CKD (unknown stage), DM2, Parkinson's Disease, HTN, depression presents with bilateral leg swelling  Mild CHF  Acute on chronic renal failure   Hyperphosphatemia  Metabolic acidosis   NSTEMI at present and cards is following     1 -Official renal sono    2 Renal- Advanced renal failure and hopefully there is some reversibility.  He is volume overloaded at present with radiographic evidence of CHF and + JVD; Cont IV lasix as ordered. Monitor output, trend BMP  He may need HD on this admission   Quantify proteinuria burden   Phoslo tid    3 CVS-Cards f/u noted;  On Lopressor  tid    4 Anemia- [id number] mg IV x 1     D/w Dr. Lencho Friedman, NP-C  [facility]  [phone]

## 2021-10-23 NOTE — PROGRESS NOTE ADULT - ASSESSMENT
71 h/o CAD (s/p CABG 2019), CKD, DM2, Parkinson's Disease, HTN, depression  a/w LE edema, found to have NSTEMI, cardiomyopathy, ABBY on CKD, Aflutter.

## 2021-10-23 NOTE — PROGRESS NOTE ADULT - SUBJECTIVE AND OBJECTIVE BOX
Chief complaint    Patient is a 71y old  Male who presents with a chief complaint of leg swelling (23 Oct 2021 10:52)   Review of systems  Patient in bed, appears comfortable.    Labs and Fingersticks  CAPILLARY BLOOD GLUCOSE      POCT Blood Glucose.: 137 mg/dL (23 Oct 2021 11:23)  POCT Blood Glucose.: 94 mg/dL (23 Oct 2021 08:56)  POCT Blood Glucose.: 111 mg/dL (23 Oct 2021 07:56)  POCT Blood Glucose.: 94 mg/dL (23 Oct 2021 07:39)  POCT Blood Glucose.: 50 mg/dL (23 Oct 2021 07:18)  POCT Blood Glucose.: 50 mg/dL (23 Oct 2021 07:17)  POCT Blood Glucose.: 120 mg/dL (22 Oct 2021 21:15)  POCT Blood Glucose.: 107 mg/dL (22 Oct 2021 16:25)      Anion Gap, Serum: 18 *H* (10-23 @ 09:03)  Anion Gap, Serum: 19 *H* (10-22 @ 09:26)      Calcium, Total Serum: 8.9 (10-23 @ 09:03)  Calcium, Total Serum: 8.8 (10-22 @ 09:26)  Intact PTH: 305 *H* (10-21 @ 23:40)  Albumin, Serum: 3.0 *L* (10-23 @ 09:03)  Albumin, Serum: 2.9 *L* (10-22 @ 09:26)    Alanine Aminotransferase (ALT/SGPT): <5 *L* (10-23 @ 09:03)  Alanine Aminotransferase (ALT/SGPT): <5 *L* (10-22 @ 09:26)  Alkaline Phosphatase, Serum: 83 (10-23 @ 09:03)  Alkaline Phosphatase, Serum: 82 (10-22 @ 09:26)  Aspartate Aminotransferase (AST/SGOT): 6 *L* (10-23 @ 09:03)  Aspartate Aminotransferase (AST/SGOT): 6 *L* (10-22 @ 09:26)        10-23    133<L>  |  100  |  103<H>  ----------------------------<  82  4.4   |  15<L>  |  6.43<H>    Ca    8.9      23 Oct 2021 09:03  Phos  7.0     10-23  Mg     2.2     10-23    TPro  7.0  /  Alb  3.0<L>  /  TBili  0.2  /  DBili  x   /  AST  6<L>  /  ALT  <5<L>  /  AlkPhos  83  10-23                        7.5    11.10 )-----------( 226      ( 23 Oct 2021 09:03 )             24.2     Medications  MEDICATIONS  (STANDING):  albuterol/ipratropium for Nebulization 3 milliLiter(s) Nebulizer every 6 hours  ascorbic acid 500 milliGRAM(s) Oral daily  atorvastatin 80 milliGRAM(s) Oral at bedtime  calcium acetate 667 milliGRAM(s) Oral three times a day with meals  carbidopa/levodopa  25/100 2 Tablet(s) Oral three times a day  cholecalciferol 1000 Unit(s) Oral daily  dextrose 40% Gel 15 Gram(s) Oral once  dextrose 5%. 1000 milliLiter(s) (50 mL/Hr) IV Continuous <Continuous>  dextrose 5%. 1000 milliLiter(s) (100 mL/Hr) IV Continuous <Continuous>  dextrose 50% Injectable 25 Gram(s) IV Push once  dextrose 50% Injectable 12.5 Gram(s) IV Push once  dextrose 50% Injectable 25 Gram(s) IV Push once  folic acid 1 milliGRAM(s) Oral daily  furosemide   Injectable 60 milliGRAM(s) IV Push two times a day  glucagon  Injectable 1 milliGRAM(s) IntraMuscular once  heparin  Infusion 700 Unit(s)/Hr (7 mL/Hr) IV Continuous <Continuous>  insulin glargine Injectable (LANTUS) 6 Unit(s) SubCutaneous at bedtime  insulin lispro (ADMELOG) corrective regimen sliding scale   SubCutaneous three times a day before meals  insulin lispro (ADMELOG) corrective regimen sliding scale   SubCutaneous at bedtime  metoprolol tartrate 25 milliGRAM(s) Oral two times a day  Nephro-celeste 1 Tablet(s) Oral daily  trihexyphenidyl 2 milliGRAM(s) Oral three times a day      Physical Exam  General: Patient comfortable in bed  Vital Signs Last 12 Hrs  T(F): 97.5 (10-23-21 @ 11:01), Max: 98.2 (10-23-21 @ 04:59)  HR: 78 (10-23-21 @ 11:01) (75 - 92)  BP: 117/54 (10-23-21 @ 11:01) (117/54 - 148/68)  BP(mean): --  RR: 18 (10-23-21 @ 11:01) (18 - 19)  SpO2: 92% (10-23-21 @ 11:01) (88% - 94%)  Neck: No palpable thyroid nodules.  CVS: S1S2, No murmurs  Respiratory: No wheezing, no crepitations  GI: Abdomen soft, bowel sounds positive  Musculoskeletal:  edema lower extremities.     Diagnostics

## 2021-10-23 NOTE — PROGRESS NOTE ADULT - ASSESSMENT
Assessment  DMT2: 71y Male with DM T2 with hyperglycemia, A1C 6.4%, was on insulin at home, now on basal insulin and coverage, Lantus dose cut in 1/2 s/p hypoglycemic episode this morning, FS now improving, patient is not eating much 2/2 swallowing difficulties.  CHF: on medications, stable, monitored.  HTN: Controlled,  on antihypertensive medications.  Parkinsons: on meds, monitored.  CKD: Monitor labs/BMP      Kelsie Reece MD  Cell: [phone]  Office: [phone]

## 2021-10-23 NOTE — CHART NOTE - NSCHARTNOTEFT_GEN_A_CORE
PTT now >200 now for 3rd time, Hgb stable, no overt signs of bleeding, will change to pt specific hep gtt 5-70 start 7 cc (discussed with pharmacy)

## 2021-10-24 NOTE — PROGRESS NOTE ADULT - SUBJECTIVE AND OBJECTIVE BOX
Patient is a 71y old  Male who presents with a chief complaint of leg swelling (23 Oct 2021 14:25)      INTERVAL HPI/OVERNIGHT EVENTS: noted  pt seen and examined this am   events noted  feels well      Vital Signs Last 24 Hrs  T(C): 37.2 (24 Oct 2021 11:24), Max: 37.2 (24 Oct 2021 11:24)  T(F): 98.9 (24 Oct 2021 11:24), Max: 98.9 (24 Oct 2021 11:24)  HR: 90 (24 Oct 2021 11:24) (72 - 90)  BP: 114/76 (24 Oct 2021 11:24) (106/67 - 127/70)  BP(mean): --  RR: 18 (24 Oct 2021 11:24) (17 - 18)  SpO2: 95% (24 Oct 2021 11:24) (91% - 95%)    acetaminophen     Tablet .. 650 milliGRAM(s) Oral every 6 hours PRN  albuterol/ipratropium for Nebulization 3 milliLiter(s) Nebulizer every 6 hours  ascorbic acid 500 milliGRAM(s) Oral daily  aspirin enteric coated 81 milliGRAM(s) Oral daily  atorvastatin 80 milliGRAM(s) Oral at bedtime  calcium acetate 667 milliGRAM(s) Oral three times a day with meals  carbidopa/levodopa  25/100 2 Tablet(s) Oral three times a day  cholecalciferol 1000 Unit(s) Oral daily  dextrose 40% Gel 15 Gram(s) Oral once  dextrose 5%. 1000 milliLiter(s) IV Continuous <Continuous>  dextrose 5%. 1000 milliLiter(s) IV Continuous <Continuous>  dextrose 50% Injectable 25 Gram(s) IV Push once  dextrose 50% Injectable 12.5 Gram(s) IV Push once  dextrose 50% Injectable 25 Gram(s) IV Push once  folic acid 1 milliGRAM(s) Oral daily  furosemide   Injectable 60 milliGRAM(s) IV Push two times a day  glucagon  Injectable 1 milliGRAM(s) IntraMuscular once  guaiFENesin Oral Liquid (Sugar-Free) 200 milliGRAM(s) Oral every 6 hours PRN  heparin  Infusion 300 Unit(s)/Hr IV Continuous <Continuous>  insulin glargine Injectable (LANTUS) 6 Unit(s) SubCutaneous at bedtime  insulin lispro (ADMELOG) corrective regimen sliding scale   SubCutaneous three times a day before meals  insulin lispro (ADMELOG) corrective regimen sliding scale   SubCutaneous at bedtime  metoprolol tartrate 25 milliGRAM(s) Oral two times a day  Nephro-celeste 1 Tablet(s) Oral daily  trihexyphenidyl 2 milliGRAM(s) Oral three times a day      PHYSICAL EXAM:  GENERAL: NAD,   EYES: conjunctiva and sclera clear  ENMT: Moist mucous membranes  NECK: Supple, No JVD, Normal thyroid  CHEST/LUNG: non labored, cta b/l  HEART: Regular rate and rhythm; No murmurs, rubs, or gallops  ABDOMEN: Soft, Nontender, Nondistended; Bowel sounds present  EXTREMITIES:  2+ Peripheral Pulses, No clubbing, cyanosis, or edema  LYMPH: No lymphadenopathy noted  SKIN: No rashes or lesions    Consultant(s) Notes Reviewed:  [x ] YES  [ ] NO  Care Discussed with Consultants/Other Providers [ x] YES  [ ] NO    LABS:                        7.3    12.41 )-----------( 236      ( 24 Oct 2021 07:21 )             22.5     10-24    132<L>  |  98  |  123<H>  ----------------------------<  110<H>  4.6   |  14<L>  |  6.75<H>    Ca    8.9      24 Oct 2021 07:19  Phos  6.5     10-24  Mg     2.2     10-24    TPro  7.1  /  Alb  3.2<L>  /  TBili  0.3  /  DBili  x   /  AST  7<L>  /  ALT  <5<L>  /  AlkPhos  78  10-24    PTT - ( 24 Oct 2021 03:08 )  PTT:>200.0 sec    CAPILLARY BLOOD GLUCOSE      POCT Blood Glucose.: 123 mg/dL (24 Oct 2021 07:49)  POCT Blood Glucose.: 193 mg/dL (23 Oct 2021 21:32)  POCT Blood Glucose.: 121 mg/dL (23 Oct 2021 16:25)              RADIOLOGY & ADDITIONAL TESTS:    Imaging Personally Reviewed:  [x ] YES  [ ] NO

## 2021-10-24 NOTE — PROGRESS NOTE ADULT - ASSESSMENT
Assessment  DMT2: 71y Male with DM T2 with hyperglycemia, A1C 6.4%, was on insulin at home, now on basal insulin and coverage, blood sugars improving, patient is not eating full meals.  CHF: on medications, stable, monitored.  HTN: Controlled,  on antihypertensive medications.  Parkinsons: on meds, monitored.  CKD: Monitor labs/BMP      Kelsie Reece MD  Cell: [phone]  Office: [phone]

## 2021-10-24 NOTE — PROVIDER CONTACT NOTE (CRITICAL VALUE NOTIFICATION) - ACTION/TREATMENT ORDERED:
Provider notified and aware. Heparin gtt to be paused for 1 hr then restarted at 3cc/hr as per order.

## 2021-10-24 NOTE — PROGRESS NOTE ADULT - SUBJECTIVE AND OBJECTIVE BOX
[facility] Cardiology Progress Note  _______________________________    Pt. seen and examined. No new cardiac-related complaints.    Telemetry -    T(C): 37.2 (10-24-21 @ 11:24), Max: 37.2 (10-24-21 @ 11:24)  HR: 90 (10-24-21 @ 11:24) (72 - 90)  BP: 114/76 (10-24-21 @ 11:24) (106/67 - 127/70)  RR: 18 (10-24-21 @ 11:24) (17 - 18)  SpO2: 95% (10-24-21 @ 11:24) (91% - 95%)  I&O's Summary    23 Oct 2021 07:01  -  24 Oct 2021 07:00  --------------------------------------------------------  IN: 960 mL / OUT: 1600 mL / NET: -640 mL        PHYSICAL EXAM:  GENERAL: Alert, NAD.  NECK: Supple, No JVD, no carotid bruit.  CHEST/LUNG: Clear to auscultation bilaterally; No wheezes, rales, or rhonchi.  HEART: S1 S2 normal, RRR; No murmurs, rubs, or gallops  ABDOMEN: Soft, Nontender, Nondistended; Bowel sounds present  EXTREMITIES:  No LE edema.      LABS:                        7.3    12.41 )-----------( 236      ( 24 Oct 2021 07:21 )             22.5     10-24    132<L>  |  98  |  123<H>  ----------------------------<  110<H>  4.6   |  14<L>  |  6.75<H>    Ca    8.9      24 Oct 2021 07:19  Phos  6.5     10-24  Mg     2.2     10-24    TPro  7.1  /  Alb  3.2<L>  /  TBili  0.3  /  DBili  x   /  AST  7<L>  /  ALT  <5<L>  /  AlkPhos  78  10-24    PTT - ( 24 Oct 2021 03:08 )  PTT:>200.0 sec  CARDIAC MARKERS ( 21 Oct 2021 07:14 )  x     / x     / 123 U/L / x     / 5.4 ng/mL              MEDICATIONS  (STANDING):  albuterol/ipratropium for Nebulization 3 milliLiter(s) Nebulizer every 6 hours  ascorbic acid 500 milliGRAM(s) Oral daily  aspirin enteric coated 81 milliGRAM(s) Oral daily  atorvastatin 80 milliGRAM(s) Oral at bedtime  calcium acetate 667 milliGRAM(s) Oral three times a day with meals  carbidopa/levodopa  25/100 2 Tablet(s) Oral three times a day  cholecalciferol 1000 Unit(s) Oral daily  dextrose 40% Gel 15 Gram(s) Oral once  dextrose 5%. 1000 milliLiter(s) (50 mL/Hr) IV Continuous <Continuous>  dextrose 5%. 1000 milliLiter(s) (100 mL/Hr) IV Continuous <Continuous>  dextrose 50% Injectable 25 Gram(s) IV Push once  dextrose 50% Injectable 12.5 Gram(s) IV Push once  dextrose 50% Injectable 25 Gram(s) IV Push once  folic acid 1 milliGRAM(s) Oral daily  furosemide   Injectable 60 milliGRAM(s) IV Push two times a day  glucagon  Injectable 1 milliGRAM(s) IntraMuscular once  heparin  Infusion 300 Unit(s)/Hr (3 mL/Hr) IV Continuous <Continuous>  insulin glargine Injectable (LANTUS) 6 Unit(s) SubCutaneous at bedtime  insulin lispro (ADMELOG) corrective regimen sliding scale   SubCutaneous three times a day before meals  insulin lispro (ADMELOG) corrective regimen sliding scale   SubCutaneous at bedtime  metoprolol tartrate 25 milliGRAM(s) Oral two times a day  Nephro-celeste 1 Tablet(s) Oral daily  trihexyphenidyl 2 milliGRAM(s) Oral three times a day    MEDICATIONS  (PRN):  acetaminophen     Tablet .. 650 milliGRAM(s) Oral every 6 hours PRN Mild Pain (1 - 3)  guaiFENesin Oral Liquid (Sugar-Free) 200 milliGRAM(s) Oral every 6 hours PRN Cough      RADIOLOGY & ADDITIONAL TESTS:     [facility] Cardiology Progress Note  _______________________________    Pt. seen and examined. No new cardiac-related complaints.    Telemetry - Aflutter 60-80's.    T(C): 37.2 (10-24-21 @ 11:24), Max: 37.2 (10-24-21 @ 11:24)  HR: 90 (10-24-21 @ 11:24) (72 - 90)  BP: 114/76 (10-24-21 @ 11:24) (106/67 - 127/70)  RR: 18 (10-24-21 @ 11:24) (17 - 18)  SpO2: 95% (10-24-21 @ 11:24) (91% - 95%)  I&O's Summary    23 Oct 2021 07:01  -  24 Oct 2021 07:00  --------------------------------------------------------  IN: 960 mL / OUT: 1600 mL / NET: -640 mL        PHYSICAL EXAM:  GENERAL: Alert, NAD. + UE Tremor.  NECK: + JVD  CHEST/LUNG: Clear to auscultation bilaterally; No wheezes, rales, or rhonchi.  HEART: S1 S2 normal, RRR; No murmurs, rubs, or gallops  ABDOMEN: Soft, Nontender, Nondistended; Bowel sounds present  EXTREMITIES: + b/l LE edema with stasis changes of LE's.    LABS:                        7.3    12.41 )-----------( 236      ( 24 Oct 2021 07:21 )             22.5     10-24    132<L>  |  98  |  123<H>  ----------------------------<  110<H>  4.6   |  14<L>  |  6.75<H>    Ca    8.9      24 Oct 2021 07:19  Phos  6.5     10-24  Mg     2.2     10-24    TPro  7.1  /  Alb  3.2<L>  /  TBili  0.3  /  DBili  x   /  AST  7<L>  /  ALT  <5<L>  /  AlkPhos  78  10-24    PTT - ( 24 Oct 2021 03:08 )  PTT:>200.0 sec  CARDIAC MARKERS ( 21 Oct 2021 07:14 )  x     / x     / 123 U/L / x     / 5.4 ng/mL              MEDICATIONS  (STANDING):  albuterol/ipratropium for Nebulization 3 milliLiter(s) Nebulizer every 6 hours  ascorbic acid 500 milliGRAM(s) Oral daily  aspirin enteric coated 81 milliGRAM(s) Oral daily  atorvastatin 80 milliGRAM(s) Oral at bedtime  calcium acetate 667 milliGRAM(s) Oral three times a day with meals  carbidopa/levodopa  25/100 2 Tablet(s) Oral three times a day  cholecalciferol 1000 Unit(s) Oral daily  dextrose 40% Gel 15 Gram(s) Oral once  dextrose 5%. 1000 milliLiter(s) (50 mL/Hr) IV Continuous <Continuous>  dextrose 5%. 1000 milliLiter(s) (100 mL/Hr) IV Continuous <Continuous>  dextrose 50% Injectable 25 Gram(s) IV Push once  dextrose 50% Injectable 12.5 Gram(s) IV Push once  dextrose 50% Injectable 25 Gram(s) IV Push once  folic acid 1 milliGRAM(s) Oral daily  furosemide   Injectable 60 milliGRAM(s) IV Push two times a day  glucagon  Injectable 1 milliGRAM(s) IntraMuscular once  heparin  Infusion 300 Unit(s)/Hr (3 mL/Hr) IV Continuous <Continuous>  insulin glargine Injectable (LANTUS) 6 Unit(s) SubCutaneous at bedtime  insulin lispro (ADMELOG) corrective regimen sliding scale   SubCutaneous three times a day before meals  insulin lispro (ADMELOG) corrective regimen sliding scale   SubCutaneous at bedtime  metoprolol tartrate 25 milliGRAM(s) Oral two times a day  Nephro-celeste 1 Tablet(s) Oral daily  trihexyphenidyl 2 milliGRAM(s) Oral three times a day    MEDICATIONS  (PRN):  acetaminophen     Tablet .. 650 milliGRAM(s) Oral every 6 hours PRN Mild Pain (1 - 3)  guaiFENesin Oral Liquid (Sugar-Free) 200 milliGRAM(s) Oral every 6 hours PRN Cough      RADIOLOGY & ADDITIONAL TESTS:

## 2021-10-24 NOTE — PROGRESS NOTE ADULT - PROBLEM SELECTOR PLAN 1
-last echo 2019 showed normal EF with mild diastolic dysfunction  -elevated BNP with lower extremity edema  -will require high doses of lasix secondary to signficant renal disease (no clear CKD diagnosis but had elevated Cr in 2019, likely acute on chronic renal failure now secondary to cardiorenal syndrome)   lasix 60mg IV BID uptitrate as needed, monitor blood pressure  TTE fu  -strict I/O  -fluid restriction    #NSTEMI-troponin s peaked  cards cs appreciated  likely will need cath at a later point once creatinine improves

## 2021-10-24 NOTE — PROGRESS NOTE ADULT - ASSESSMENT
72yo M w/ PMHx of CAD (s/p CABG 2019), CKD (unknown stage), DM2, Parkinson's Disease, HTN, depression presents with new onset acute heart failure exacerbation

## 2021-10-24 NOTE — CHART NOTE - NSCHARTNOTEFT_GEN_A_CORE
PTT > 200. Pt assessed, sitting comfortably in chair. No overt signs of bleeding. Will change AC from heparin gtt to full dose Lovenox. Iron studies, B12, and folate bloodwork ordered. Discussed with medical attending. Will continue to monitor patient.     Zora Salomon PA-C  [phone]

## 2021-10-24 NOTE — PROGRESS NOTE ADULT - PROBLEM SELECTOR PLAN 3
-  - h/o CABG.  - eventual cath.  - will reduce ASA to 81 mg.  - cont high intensity statin. -  - h/o CABG.  - eventual cath.  - cont ASA to 81 mg.  - cont high intensity statin.

## 2021-10-24 NOTE — PROGRESS NOTE ADULT - PROBLEM SELECTOR PLAN 7
likely anemia of ckd, chr dz  check iron studies  no e/o active bleed, monitor while on heparin  transfuse for hb<7 or symptoms

## 2021-10-24 NOTE — PROGRESS NOTE ADULT - PROBLEM SELECTOR PLAN 5
-  - cont heparin gtt.  - the ABBY on CKD is also contributing to the hstroponin levels. Continue to trend.  - cont ASA, statin, BB.  - cont heparin gtt.  - eventual cath as noted.    our team to follow.    Zachery Castillo M.D., [facility]  [phone] -  - cont heparin gtt.  - the ABBY on CKD is also contributing to the hs-troponin levels. Continue to trend.  - cont ASA, statin, BB.  - cont heparin gtt.  - eventual cath as noted.    our team to follow.    Zachery Castillo M.D., [facility]  [phone]

## 2021-10-24 NOTE — PROGRESS NOTE ADULT - SUBJECTIVE AND OBJECTIVE BOX
Chief complaint    Patient is a 71y old  Male who presents with a chief complaint of leg swelling (24 Oct 2021 14:41)   Review of systems  Patient in bed, appears comfortable.    Labs and Fingersticks  CAPILLARY BLOOD GLUCOSE      POCT Blood Glucose.: 169 mg/dL (24 Oct 2021 16:18)  POCT Blood Glucose.: 147 mg/dL (24 Oct 2021 11:29)  POCT Blood Glucose.: 123 mg/dL (24 Oct 2021 07:49)  POCT Blood Glucose.: 193 mg/dL (23 Oct 2021 21:32)      Anion Gap, Serum: 20 *H* (10-24 @ 07:19)  Anion Gap, Serum: 18 *H* (10-23 @ 09:03)      Calcium, Total Serum: 8.9 (10-24 @ 07:19)  Calcium, Total Serum: 8.9 (10-23 @ 09:03)  Albumin, Serum: 3.2 *L* (10-24 @ 07:19)  Albumin, Serum: 3.0 *L* (10-23 @ 09:03)    Alanine Aminotransferase (ALT/SGPT): <5 *L* (10-24 @ 07:19)  Alanine Aminotransferase (ALT/SGPT): <5 *L* (10-23 @ 09:03)  Alkaline Phosphatase, Serum: 78 (10-24 @ 07:19)  Alkaline Phosphatase, Serum: 83 (10-23 @ 09:03)  Aspartate Aminotransferase (AST/SGOT): 7 *L* (10-24 @ 07:19)  Aspartate Aminotransferase (AST/SGOT): 6 *L* (10-23 @ 09:03)        10-24    132<L>  |  98  |  123<H>  ----------------------------<  110<H>  4.6   |  14<L>  |  6.75<H>    Ca    8.9      24 Oct 2021 07:19  Phos  6.5     10-24  Mg     2.2     10-24    TPro  7.1  /  Alb  3.2<L>  /  TBili  0.3  /  DBili  x   /  AST  7<L>  /  ALT  <5<L>  /  AlkPhos  78  10-24                        7.3    12.41 )-----------( 236      ( 24 Oct 2021 07:21 )             22.5     Medications  MEDICATIONS  (STANDING):  albuterol/ipratropium for Nebulization 3 milliLiter(s) Nebulizer every 6 hours  ascorbic acid 500 milliGRAM(s) Oral daily  aspirin enteric coated 81 milliGRAM(s) Oral daily  atorvastatin 80 milliGRAM(s) Oral at bedtime  calcium acetate 667 milliGRAM(s) Oral three times a day with meals  carbidopa/levodopa  25/100 2 Tablet(s) Oral three times a day  cholecalciferol 1000 Unit(s) Oral daily  dextrose 40% Gel 15 Gram(s) Oral once  dextrose 5%. 1000 milliLiter(s) (50 mL/Hr) IV Continuous <Continuous>  dextrose 5%. 1000 milliLiter(s) (100 mL/Hr) IV Continuous <Continuous>  dextrose 50% Injectable 25 Gram(s) IV Push once  dextrose 50% Injectable 12.5 Gram(s) IV Push once  dextrose 50% Injectable 25 Gram(s) IV Push once  enoxaparin Injectable 100 milliGRAM(s) SubCutaneous daily  folic acid 1 milliGRAM(s) Oral daily  furosemide   Injectable 60 milliGRAM(s) IV Push two times a day  glucagon  Injectable 1 milliGRAM(s) IntraMuscular once  insulin glargine Injectable (LANTUS) 6 Unit(s) SubCutaneous at bedtime  insulin lispro (ADMELOG) corrective regimen sliding scale   SubCutaneous three times a day before meals  insulin lispro (ADMELOG) corrective regimen sliding scale   SubCutaneous at bedtime  metoprolol tartrate 25 milliGRAM(s) Oral two times a day  Nephro-celeste 1 Tablet(s) Oral daily  trihexyphenidyl 2 milliGRAM(s) Oral three times a day      Physical Exam  General: Patient comfortable in bed  Vital Signs Last 12 Hrs  T(F): 98.9 (10-24-21 @ 11:24), Max: 98.9 (10-24-21 @ 11:24)  HR: 87 (10-24-21 @ 17:19) (87 - 90)  BP: 107/74 (10-24-21 @ 17:19) (107/74 - 114/76)  BP(mean): --  RR: 18 (10-24-21 @ 17:19) (18 - 18)  SpO2: 97% (10-24-21 @ 17:19) (95% - 97%)  Neck: No palpable thyroid nodules.  CVS: S1S2, No murmurs  Respiratory: No wheezing, no crepitations  GI: Abdomen soft, bowel sounds positive  Musculoskeletal:  edema lower extremities.     Diagnostics

## 2021-10-24 NOTE — PROGRESS NOTE ADULT - SUBJECTIVE AND OBJECTIVE BOX
NEPHROLOGY     Patient seen and examined.    MEDICATIONS  (STANDING):  albuterol/ipratropium for Nebulization 3 milliLiter(s) Nebulizer every 6 hours  ascorbic acid 500 milliGRAM(s) Oral daily  aspirin enteric coated 81 milliGRAM(s) Oral daily  atorvastatin 80 milliGRAM(s) Oral at bedtime  calcium acetate 667 milliGRAM(s) Oral three times a day with meals  carbidopa/levodopa  25/100 2 Tablet(s) Oral three times a day  cholecalciferol 1000 Unit(s) Oral daily  dextrose 40% Gel 15 Gram(s) Oral once  dextrose 5%. 1000 milliLiter(s) (50 mL/Hr) IV Continuous <Continuous>  dextrose 5%. 1000 milliLiter(s) (100 mL/Hr) IV Continuous <Continuous>  dextrose 50% Injectable 25 Gram(s) IV Push once  dextrose 50% Injectable 12.5 Gram(s) IV Push once  dextrose 50% Injectable 25 Gram(s) IV Push once  enoxaparin Injectable 100 milliGRAM(s) SubCutaneous two times a day  folic acid 1 milliGRAM(s) Oral daily  furosemide   Injectable 60 milliGRAM(s) IV Push two times a day  glucagon  Injectable 1 milliGRAM(s) IntraMuscular once  insulin glargine Injectable (LANTUS) 6 Unit(s) SubCutaneous at bedtime  insulin lispro (ADMELOG) corrective regimen sliding scale   SubCutaneous three times a day before meals  insulin lispro (ADMELOG) corrective regimen sliding scale   SubCutaneous at bedtime  metoprolol tartrate 25 milliGRAM(s) Oral two times a day  Nephro-celeste 1 Tablet(s) Oral daily  trihexyphenidyl 2 milliGRAM(s) Oral three times a day    VITALS:  T(C): , Max: 37.2 (10-24-21 @ 11:24)  T(F): , Max: 98.9 (10-24-21 @ 11:24)  HR: 90 (10-24-21 @ 11:24)  BP: 114/76 (10-24-21 @ 11:24)  RR: 18 (10-24-21 @ 11:24)  SpO2: 95% (10-24-21 @ 11:24)    I and O's:    10-23 @ 07:01  -  10-24 @ 07:00  --------------------------------------------------------  IN: 960 mL / OUT: 1600 mL / NET: -640 mL    10-24 @ 07:01  -  10-24 @ 14:41  --------------------------------------------------------  IN: 200 mL / OUT: 500 mL / NET: -300 mL    PHYSICAL EXAM:  Constitutional: NAD  Neck:  + JVD  Respiratory: diminished at bases  Cardiovascular: S1 and S2  Gastrointestinal: BS+, soft, NT/ND  Extremities: +edema  Neurological: A/O x 3, no focal deficits  Psychiatric: Normal mood, normal affect  : +  Javier  Skin: No rashes    LABS:                        7.3    12.41 )-----------( 236      ( 24 Oct 2021 07:21 )             22.5     10-24    132<L>  |  98  |  123<H>  ----------------------------<  110<H>  4.6   |  14<L>  |  6.75<H>    Ca    8.9      24 Oct 2021 07:19  Phos  6.5     10-24  Mg     2.2     10-24    TPro  7.1  /  Alb  3.2<L>  /  TBili  0.3  /  DBili  x   /  AST  7<L>  /  ALT  <5<L>  /  AlkPhos  78  10-24    RADIOLOGY & ADDITIONAL STUDIES:      EXAM:  XR CHEST PORTABLE URGENT 1V                            PROCEDURE DATE:  10/23/2021      INTERPRETATION:  HISTORY: Cough. Mild hypoxia event    TECHNIQUE: A single AP view of the chest was obtained.    COMPARISON: 10/21/2021    FINDINGS: The heart size cannot be adequately assessed on this single view. The patient is status post median sternotomy and CABG. There is a moderate to large left pleural effusion and/or left lower lobe atelectasis. There are patchy airspace opacities throughout the right lung, worsened compared to the prior exam.    IMPRESSION: Moderate to large left pleural effusion and/or left lower lobe atelectasis, slightly increased in size compared the prior exam. Patchy airspace opacities throughout the right lung, worsened, which may represent atypical pneumonia versus asymmetric pulmonary edema.    --- End of Report ---    LUIS HUFFMAN MD; Attending Radiologist  This document has been electronically signed. Oct 24 2021 10:50AM   NEPHROLOGY     Patient seen and examined sitting in chair. Pt reports feeling ok, tired, occasional cough, not currently sob, on 2L NC, in no acute distress    MEDICATIONS  (STANDING):  albuterol/ipratropium for Nebulization 3 milliLiter(s) Nebulizer every 6 hours  ascorbic acid 500 milliGRAM(s) Oral daily  aspirin enteric coated 81 milliGRAM(s) Oral daily  atorvastatin 80 milliGRAM(s) Oral at bedtime  calcium acetate 667 milliGRAM(s) Oral three times a day with meals  carbidopa/levodopa  25/100 2 Tablet(s) Oral three times a day  cholecalciferol 1000 Unit(s) Oral daily  dextrose 40% Gel 15 Gram(s) Oral once  dextrose 5%. 1000 milliLiter(s) (50 mL/Hr) IV Continuous <Continuous>  dextrose 5%. 1000 milliLiter(s) (100 mL/Hr) IV Continuous <Continuous>  dextrose 50% Injectable 25 Gram(s) IV Push once  dextrose 50% Injectable 12.5 Gram(s) IV Push once  dextrose 50% Injectable 25 Gram(s) IV Push once  enoxaparin Injectable 100 milliGRAM(s) SubCutaneous two times a day  folic acid 1 milliGRAM(s) Oral daily  furosemide   Injectable 60 milliGRAM(s) IV Push two times a day  glucagon  Injectable 1 milliGRAM(s) IntraMuscular once  insulin glargine Injectable (LANTUS) 6 Unit(s) SubCutaneous at bedtime  insulin lispro (ADMELOG) corrective regimen sliding scale   SubCutaneous three times a day before meals  insulin lispro (ADMELOG) corrective regimen sliding scale   SubCutaneous at bedtime  metoprolol tartrate 25 milliGRAM(s) Oral two times a day  Nephro-celeste 1 Tablet(s) Oral daily  trihexyphenidyl 2 milliGRAM(s) Oral three times a day    VITALS:  T(C): , Max: 37.2 (10-24-21 @ 11:24)  T(F): , Max: 98.9 (10-24-21 @ 11:24)  HR: 90 (10-24-21 @ 11:24)  BP: 114/76 (10-24-21 @ 11:24)  RR: 18 (10-24-21 @ 11:24)  SpO2: 95% (10-24-21 @ 11:24)    I and O's:    10-23 @ 07:01  -  10-24 @ 07:00  --------------------------------------------------------  IN: 960 mL / OUT: 1600 mL / NET: -640 mL    10-24 @ 07:01  -  10-24 @ 14:41  --------------------------------------------------------  IN: 200 mL / OUT: 500 mL / NET: -300 mL    PHYSICAL EXAM:  Constitutional: NAD  Neck:  + JVD  Respiratory: diminished at bases  Cardiovascular: S1 and S2  Gastrointestinal: BS+, soft, NT/ND  Extremities: +edema  Neurological: A/O x 3, no focal deficits  Psychiatric: Normal mood, normal affect  : +  Javier  Skin: No rashes    LABS:                        7.3    12.41 )-----------( 236      ( 24 Oct 2021 07:21 )             22.5     10-24    132<L>  |  98  |  123<H>  ----------------------------<  110<H>  4.6   |  14<L>  |  6.75<H>    Ca    8.9      24 Oct 2021 07:19  Phos  6.5     10-24  Mg     2.2     10-24    TPro  7.1  /  Alb  3.2<L>  /  TBili  0.3  /  DBili  x   /  AST  7<L>  /  ALT  <5<L>  /  AlkPhos  78  10-24    RADIOLOGY & ADDITIONAL STUDIES:      EXAM:  XR CHEST PORTABLE URGENT 1V                            PROCEDURE DATE:  10/23/2021      INTERPRETATION:  HISTORY: Cough. Mild hypoxia event    TECHNIQUE: A single AP view of the chest was obtained.    COMPARISON: 10/21/2021    FINDINGS: The heart size cannot be adequately assessed on this single view. The patient is status post median sternotomy and CABG. There is a moderate to large left pleural effusion and/or left lower lobe atelectasis. There are patchy airspace opacities throughout the right lung, worsened compared to the prior exam.    IMPRESSION: Moderate to large left pleural effusion and/or left lower lobe atelectasis, slightly increased in size compared the prior exam. Patchy airspace opacities throughout the right lung, worsened, which may represent atypical pneumonia versus asymmetric pulmonary edema.    --- End of Report ---    LUIS HUFFMAN MD; Attending Radiologist  This document has been electronically signed. Oct 24 2021 10:50AM

## 2021-10-24 NOTE — PROGRESS NOTE ADULT - ASSESSMENT
ASSESSMENT/PLAN:  72yo M w/ PMHx of CAD (s/p CABG 2019), CKD (unknown stage), DM2, Parkinson's Disease, HTN, depression presents with bilateral leg swelling  Mild CHF  Acute on chronic renal failure   Hyperphosphatemia  Metabolic acidosis   NSTEMI at present and cards is following     1 -Official renal sono    2 Renal- Advanced renal failure and hopefully there is some reversibility.  He is volume overloaded at present with radiographic evidence of CHF and + JVD; Cont IV lasix as ordered. Monitor output, trend BMP  He may need HD on this admission   Quantify proteinuria burden   Phoslo tid    3 CVS-Cards f/u noted;  On Lopressor  tid    4 Anemia- s/p [id number] mg IV x 1     D/w Dr. Lencho Friedman, NP-C  [facility]  [phone]          ASSESSMENT/PLAN:  70yo M w/ PMHx of CAD (s/p CABG 2019), CKD (unknown stage), DM2, Parkinson's Disease, HTN, depression presents with bilateral leg swelling  Mild CHF  Acute on chronic renal failure   Hyperphosphatemia  Metabolic acidosis   NSTEMI at present and cards is following     1 -Renal sono noted     2 Renal- Advanced renal failure and hopefully there is some reversibility.  He is volume overloaded at present with radiographic evidence of CHF and + JVD; Cont IV lasix as ordered. Monitor output, trend BMP  He may need HD on this admission   Quantify proteinuria burden   Phoslo tid    3 CVS-Cards f/u noted;  On Lopressor  tid    4 Anemia- s/p [id number] mg IV x 1     D/w Dr. Lencho Friedman, NP-C  [facility]  [phone]

## 2021-10-25 NOTE — PROGRESS NOTE ADULT - SUBJECTIVE AND OBJECTIVE BOX
Patient is a 71y old  Male who presents with a chief complaint of leg swelling (25 Oct 2021 16:36)      INTERVAL HPI/OVERNIGHT EVENTS: noted  pt seen and examined this am   events noted  c/o lt groin pain      Vital Signs Last 24 Hrs  T(C): 36.4 (25 Oct 2021 20:04), Max: 36.8 (25 Oct 2021 19:20)  T(F): 97.6 (25 Oct 2021 20:04), Max: 98.2 (25 Oct 2021 19:20)  HR: 103 (25 Oct 2021 20:04) (75 - 110)  BP: 126/73 (25 Oct 2021 20:04) (103/78 - 139/69)  BP(mean): --  RR: 18 (25 Oct 2021 20:04) (18 - 18)  SpO2: 95% (25 Oct 2021 20:04) (95% - 98%)    acetaminophen     Tablet .. 650 milliGRAM(s) Oral every 6 hours PRN  albuterol/ipratropium for Nebulization 3 milliLiter(s) Nebulizer every 6 hours  ascorbic acid 500 milliGRAM(s) Oral daily  aspirin enteric coated 81 milliGRAM(s) Oral daily  atorvastatin 80 milliGRAM(s) Oral at bedtime  calcium acetate 1334 milliGRAM(s) Oral three times a day with meals  carbidopa/levodopa  25/100 2 Tablet(s) Oral three times a day  cholecalciferol 1000 Unit(s) Oral daily  dextrose 40% Gel 15 Gram(s) Oral once  dextrose 5%. 1000 milliLiter(s) IV Continuous <Continuous>  dextrose 5%. 1000 milliLiter(s) IV Continuous <Continuous>  dextrose 50% Injectable 25 Gram(s) IV Push once  dextrose 50% Injectable 12.5 Gram(s) IV Push once  dextrose 50% Injectable 25 Gram(s) IV Push once  enoxaparin Injectable 100 milliGRAM(s) SubCutaneous daily  folic acid 1 milliGRAM(s) Oral daily  furosemide   Injectable 60 milliGRAM(s) IV Push two times a day  glucagon  Injectable 1 milliGRAM(s) IntraMuscular once  guaiFENesin Oral Liquid (Sugar-Free) 200 milliGRAM(s) Oral every 6 hours PRN  insulin glargine Injectable (LANTUS) 6 Unit(s) SubCutaneous at bedtime  insulin lispro (ADMELOG) corrective regimen sliding scale   SubCutaneous three times a day before meals  insulin lispro (ADMELOG) corrective regimen sliding scale   SubCutaneous at bedtime  metoprolol tartrate 25 milliGRAM(s) Oral two times a day  Nephro-celeste 1 Tablet(s) Oral daily  trihexyphenidyl 2 milliGRAM(s) Oral three times a day      PHYSICAL EXAM:  GENERAL: NAD,   EYES: conjunctiva and sclera clear  ENMT: Moist mucous membranes  NECK: Supple, No JVD, Normal thyroid  CHEST/LUNG: non labored, cta b/l  HEART: Regular rate and rhythm; No murmurs, rubs, or gallops  ABDOMEN: Soft, Nontender, Nondistended; Bowel sounds present  EXTREMITIES:  2+ Peripheral Pulses, No clubbing, cyanosis, or edema  LYMPH: No lymphadenopathy noted  SKIN: No rashes or lesions    Consultant(s) Notes Reviewed:  [x ] YES  [ ] NO  Care Discussed with Consultants/Other Providers [ x] YES  [ ] NO    LABS:                        6.1    11.79 )-----------( 217      ( 25 Oct 2021 09:06 )             19.0     10-25    131<L>  |  97  |  132<H>  ----------------------------<  132<H>  5.1   |  14<L>  |  7.23<H>    Ca    9.2      25 Oct 2021 07:37  Phos  7.2     10-25  Mg     2.2     10-25    TPro  6.9  /  Alb  3.0<L>  /  TBili  0.2  /  DBili  x   /  AST  9<L>  /  ALT  <5<L>  /  AlkPhos  73  10-25    PTT - ( 25 Oct 2021 07:24 )  PTT:80.8 sec    CAPILLARY BLOOD GLUCOSE      POCT Blood Glucose.: 138 mg/dL (25 Oct 2021 21:03)  POCT Blood Glucose.: 159 mg/dL (25 Oct 2021 16:22)  POCT Blood Glucose.: 172 mg/dL (25 Oct 2021 11:52)  POCT Blood Glucose.: 148 mg/dL (25 Oct 2021 07:34)              RADIOLOGY & ADDITIONAL TESTS:    Imaging Personally Reviewed:  [x ] YES  [ ] NO

## 2021-10-25 NOTE — CONSULT NOTE ADULT - ASSESSMENT
Patient seen and evaluated   - toenails debrided x10 using sterile nippers  - all offending ingrowing nail boarders excised   - patient educated on proper diabetic foot care and importance of regular examinations   - recommend z flow boots at all times while in bed  - follow up as outpatient for routine care  - thank you for the consult

## 2021-10-25 NOTE — PROGRESS NOTE ADULT - ASSESSMENT
71 h/o CAD (s/p CABG 2019), CKD, DM2, Parkinson's Disease, HTN, depression  a/w LE edema, found to have NSTEMI, cardiomyopathy, ABBY on CKD, Aflutter.    TTE  Moderate to severe segmental left ventricular systolic  dysfunction. There is hypokinesis of anterior wall, septum  and apex. Visual estimate of EF about 35%.  Bilateral pleural effusions.

## 2021-10-25 NOTE — PROGRESS NOTE ADULT - PROBLEM SELECTOR PLAN 5
-  - cont heparin gtt.  - medical management given renal function and significant anemia. if renal function and anemia stabilizes then will reconsider invasive evaluation such as cardiac cath.   - cont ASA, statin, BB.    [facility]

## 2021-10-25 NOTE — PROGRESS NOTE ADULT - SUBJECTIVE AND OBJECTIVE BOX
NEPHROLOGY-[facility] [phone]        Patient seen and examined         MEDICATIONS  (STANDING):  albuterol/ipratropium for Nebulization 3 milliLiter(s) Nebulizer every 6 hours  ascorbic acid 500 milliGRAM(s) Oral daily  aspirin enteric coated 81 milliGRAM(s) Oral daily  atorvastatin 80 milliGRAM(s) Oral at bedtime  calcium acetate 667 milliGRAM(s) Oral three times a day with meals  carbidopa/levodopa  25/100 2 Tablet(s) Oral three times a day  cholecalciferol 1000 Unit(s) Oral daily  dextrose 40% Gel 15 Gram(s) Oral once  dextrose 5%. 1000 milliLiter(s) (50 mL/Hr) IV Continuous <Continuous>  dextrose 5%. 1000 milliLiter(s) (100 mL/Hr) IV Continuous <Continuous>  dextrose 50% Injectable 25 Gram(s) IV Push once  dextrose 50% Injectable 12.5 Gram(s) IV Push once  dextrose 50% Injectable 25 Gram(s) IV Push once  enoxaparin Injectable 100 milliGRAM(s) SubCutaneous daily  folic acid 1 milliGRAM(s) Oral daily  furosemide   Injectable 60 milliGRAM(s) IV Push two times a day  glucagon  Injectable 1 milliGRAM(s) IntraMuscular once  insulin glargine Injectable (LANTUS) 6 Unit(s) SubCutaneous at bedtime  insulin lispro (ADMELOG) corrective regimen sliding scale   SubCutaneous three times a day before meals  insulin lispro (ADMELOG) corrective regimen sliding scale   SubCutaneous at bedtime  metoprolol tartrate 25 milliGRAM(s) Oral two times a day  Nephro-celeste 1 Tablet(s) Oral daily  trihexyphenidyl 2 milliGRAM(s) Oral three times a day      VITAL:  T(C): , Max: 37.2 (10-24-21 @ 11:24)  T(F): , Max: 98.9 (10-24-21 @ 11:24)  HR: 78 (10-25-21 @ 08:33)  BP: 103/78 (10-25-21 @ 08:33)  BP(mean): --  RR: 18 (10-25-21 @ 08:33)  SpO2: 95% (10-25-21 @ 08:33)  Wt(kg): --    I and O's:    10-24 @ 07:01  -  10-25 @ 07:00  --------------------------------------------------------  IN: 200 mL / OUT: 1000 mL / NET: -800 mL          PHYSICAL EXAM:    Constitutional: NAD  Neck:  No JVD  Respiratory: CTAB/L  Cardiovascular: S1 and S2  Gastrointestinal: BS+, soft, NT/ND  Extremities: No peripheral edema  Neurological: A/O x 3, no focal deficits  Psychiatric: Normal mood, normal affect  : No Javier  Skin: No rashes  Access: Not applicable    LABS:                        6.1    11.79 )-----------( 217      ( 25 Oct 2021 09:06 )             19.0     10-25    131<L>  |  97  |  132<H>  ----------------------------<  132<H>  5.1   |  14<L>  |  7.23<H>    Ca    9.2      25 Oct 2021 07:37  Phos  7.2     10-25  Mg     2.2     10-25    TPro  6.9  /  Alb  3.0<L>  /  TBili  0.2  /  DBili  x   /  AST  9<L>  /  ALT  <5<L>  /  AlkPhos  73  10-25          Urine Studies:          RADIOLOGY & ADDITIONAL STUDIES:        < from: TTE with Doppler (w/Cont) (10.21.21 @ 17:11) >    Patient name: ORLIN HARDIN  YOB: 1950   Age: 71 (M)   MR#: [id number]  Study Date: 10/21/2021  [facility]  Study quality: Technically difficult  Referring Physician: Marika Pineda MD  Blood Pressure: 158/85 mmHg  Height: 180 cm  Weight: 107 kg  BSA: 2.3 m2  ------------------------------------------------------------------------  PROCEDURE: Transthoracic echocardiogram with 2-D, M-Mode  and complete spectral and color flow Doppler. Verbal  consent was obtained for injection of  Ultrasonic Enhancing  Agent following a discussion of risks and benefits.  Following intravenous injection of Ultrasonic Enhancing  Agent , harmonic imaging was performed.  INDICATION: Edema, unspecified (R60.9)  ------------------------------------------------------------------------  Dimensions:    Normal Values:  LA:     3.2    2.0 - 4.0 cm  Ao:     3.6    2.0 - 3.8 cm  SEPTUM: 1.4    0.6 - 1.2 cm  PWT:    1.3    0.6 - 1.1 cm  LVIDd:  4.8    3.0- 5.6 cm  LVIDs:  3.0    1.8 - 4.0 cm  Derived variables:  LVMI: 123 g/m2  RWT: 0.56  Fractional short: 38 %  EF (Visual Estimate): 35 %  Doppler Peak Velocity (m/sec): AoV=1.5  ------------------------------------------------------------------------  Observations:  Mitral Valve: Mitral annular calcification. Mild mitral  regurgitation.  Aortic Valve/Aorta: Calcified trileaflet aortic valve with  normal opening. Peak transaortic valve gradient equals 10  mm Hg, mean transaortic valve gradient equals 6 mm Hg,  aortic valve velocity time integral equals 40 cm, estimated  aortic valve area equals 2.1 sqcm. Peak left ventricular  outflow tract gradient equals 3 mm Hg, mean gradient is  equal to 2 mm Hg, LVOT velocity time integral equals 20 cm.  Aortic Root: 3.6 cm.  LVOT diameter: 2.2 cm.  Left Atrium: Severely dilated left atrium.  LA volume index  = 59 cc/m2.  Left Ventricle: Endocardial visualization enhanced with  intravenous injection of Ultrasonic Enhancing Agent  (Definity). moderate to severe segmental left ventricular  systolic dysfunction. There is hypokinesis of anterior  wall, septum and apex. Visual estimate of EF about 35%.  Increased relative wall thickness with normal left  ventricular mass index, consistent with concentric left  ventricular remodeling. Normal diastolic function  Right Heart: Normal right atrium. Normal right ventricular  size with decreased right ventricular systolic function.  Normal tricuspid valve. Mild tricuspid regurgitation.  Normal pulmonic valve. Minimal pulmonic regurgitation.  Pericardium/Pleura: Normal pericardium with no pericardial  effusion.  Bilateral pleural effusions.  Hemodynamic: Estimated right atrial pressure is 8 mm Hg.  Estimated right ventricular systolic pressure equals 24 mm  Hg, assuming right atrial pressure equals 8 mm Hg,  consistent with normal pulmonary pressures.  ------------------------------------------------------------------------  Conclusions:  1. Calcified trileaflet aortic valve with normal opening.  Peak transaortic valve gradient equals 10 mm Hg, mean  transaortic valve gradient equals 6 mm Hg, aortic valve  velocity time integral equals 40 cm, estimated aortic valve  area equals 2.1 sqcm.  2. Endocardial visualization enhanced with intravenous  injection of Ultrasonic Enhancing Agent (Definity).  moderate to severe segmental left ventricular systolic  dysfunction. There is hypokinesis of anterior wall, septum  and apex. Visual estimate of EF about 35%.  3. Normal right ventricular size with decreased right  ventricular systolic function.  4. Normal tricuspid valve. Mild tricuspid regurgitation.  5. Estimated pulmonary artery systolic pressure equals 24  mm Hg, assuming right atrial pressure equals 8  mm Hg,  consistent with normal pulmonary pressures.  6. Bilateral pleural effusions.  ------------------------------------------------------------------------  Confirmed on  10/21/2021 - 19:08:16 by Aicha Pettit M.D.  -----    < end of copied text >       NEPHROLOGY-[facility] [phone]        Patient seen and examined in bed.  He was about the same         MEDICATIONS  (STANDING):  albuterol/ipratropium for Nebulization 3 milliLiter(s) Nebulizer every 6 hours  ascorbic acid 500 milliGRAM(s) Oral daily  aspirin enteric coated 81 milliGRAM(s) Oral daily  atorvastatin 80 milliGRAM(s) Oral at bedtime  calcium acetate 667 milliGRAM(s) Oral three times a day with meals  carbidopa/levodopa  25/100 2 Tablet(s) Oral three times a day  cholecalciferol 1000 Unit(s) Oral daily  dextrose 40% Gel 15 Gram(s) Oral once  dextrose 5%. 1000 milliLiter(s) (50 mL/Hr) IV Continuous <Continuous>  dextrose 5%. 1000 milliLiter(s) (100 mL/Hr) IV Continuous <Continuous>  dextrose 50% Injectable 25 Gram(s) IV Push once  dextrose 50% Injectable 12.5 Gram(s) IV Push once  dextrose 50% Injectable 25 Gram(s) IV Push once  enoxaparin Injectable 100 milliGRAM(s) SubCutaneous daily  folic acid 1 milliGRAM(s) Oral daily  furosemide   Injectable 60 milliGRAM(s) IV Push two times a day  glucagon  Injectable 1 milliGRAM(s) IntraMuscular once  insulin glargine Injectable (LANTUS) 6 Unit(s) SubCutaneous at bedtime  insulin lispro (ADMELOG) corrective regimen sliding scale   SubCutaneous three times a day before meals  insulin lispro (ADMELOG) corrective regimen sliding scale   SubCutaneous at bedtime  metoprolol tartrate 25 milliGRAM(s) Oral two times a day  Nephro-celeste 1 Tablet(s) Oral daily  trihexyphenidyl 2 milliGRAM(s) Oral three times a day      VITAL:  T(C): , Max: 37.2 (10-24-21 @ 11:24)  T(F): , Max: 98.9 (10-24-21 @ 11:24)  HR: 78 (10-25-21 @ 08:33)  BP: 103/78 (10-25-21 @ 08:33)  BP(mean): --  RR: 18 (10-25-21 @ 08:33)  SpO2: 95% (10-25-21 @ 08:33)  Wt(kg): --    I and O's:    10-24 @ 07:01  -  10-25 @ 07:00  --------------------------------------------------------  IN: 200 mL / OUT: 1000 mL / NET: -800 mL          PHYSICAL EXAM:    Constitutional: NAD  Neck:  No JVD  Respiratory: CTAB/L  Cardiovascular: S1 and S2  Gastrointestinal: BS+, soft, NT/ND  Extremities: No peripheral edema  Neurological: A/O x 3, no focal deficits  Psychiatric: Normal mood, normal affect  : No Javier  Skin: No rashes  Access: Not applicable    LABS:                        6.1    11.79 )-----------( 217      ( 25 Oct 2021 09:06 )             19.0     10-25    131<L>  |  97  |  132<H>  ----------------------------<  132<H>  5.1   |  14<L>  |  7.23<H>    Ca    9.2      25 Oct 2021 07:37  Phos  7.2     10-25  Mg     2.2     10-25    TPro  6.9  /  Alb  3.0<L>  /  TBili  0.2  /  DBili  x   /  AST  9<L>  /  ALT  <5<L>  /  AlkPhos  73  10-25          Urine Studies:          RADIOLOGY & ADDITIONAL STUDIES:        < from: TTE with Doppler (w/Cont) (10.21.21 @ 17:11) >    Patient name: ORLIN HARDIN  YOB: 1950   Age: 71 (M)   MR#: [id number]  Study Date: 10/21/2021  [facility]  Study quality: Technically difficult  Referring Physician: Marika Pineda MD  Blood Pressure: 158/85 mmHg  Height: 180 cm  Weight: 107 kg  BSA: 2.3 m2  ------------------------------------------------------------------------  PROCEDURE: Transthoracic echocardiogram with 2-D, M-Mode  and complete spectral and color flow Doppler. Verbal  consent was obtained for injection of  Ultrasonic Enhancing  Agent following a discussion of risks and benefits.  Following intravenous injection of Ultrasonic Enhancing  Agent , harmonic imaging was performed.  INDICATION: Edema, unspecified (R60.9)  ------------------------------------------------------------------------  Dimensions:    Normal Values:  LA:     3.2    2.0 - 4.0 cm  Ao:     3.6    2.0 - 3.8 cm  SEPTUM: 1.4    0.6 - 1.2 cm  PWT:    1.3    0.6 - 1.1 cm  LVIDd:  4.8    3.0- 5.6 cm  LVIDs:  3.0    1.8 - 4.0 cm  Derived variables:  LVMI: 123 g/m2  RWT: 0.56  Fractional short: 38 %  EF (Visual Estimate): 35 %  Doppler Peak Velocity (m/sec): AoV=1.5  ------------------------------------------------------------------------  Observations:  Mitral Valve: Mitral annular calcification. Mild mitral  regurgitation.  Aortic Valve/Aorta: Calcified trileaflet aortic valve with  normal opening. Peak transaortic valve gradient equals 10  mm Hg, mean transaortic valve gradient equals 6 mm Hg,  aortic valve velocity time integral equals 40 cm, estimated  aortic valve area equals 2.1 sqcm. Peak left ventricular  outflow tract gradient equals 3 mm Hg, mean gradient is  equal to 2 mm Hg, LVOT velocity time integral equals 20 cm.  Aortic Root: 3.6 cm.  LVOT diameter: 2.2 cm.  Left Atrium: Severely dilated left atrium.  LA volume index  = 59 cc/m2.  Left Ventricle: Endocardial visualization enhanced with  intravenous injection of Ultrasonic Enhancing Agent  (Definity). moderate to severe segmental left ventricular  systolic dysfunction. There is hypokinesis of anterior  wall, septum and apex. Visual estimate of EF about 35%.  Increased relative wall thickness with normal left  ventricular mass index, consistent with concentric left  ventricular remodeling. Normal diastolic function  Right Heart: Normal right atrium. Normal right ventricular  size with decreased right ventricular systolic function.  Normal tricuspid valve. Mild tricuspid regurgitation.  Normal pulmonic valve. Minimal pulmonic regurgitation.  Pericardium/Pleura: Normal pericardium with no pericardial  effusion.  Bilateral pleural effusions.  Hemodynamic: Estimated right atrial pressure is 8 mm Hg.  Estimated right ventricular systolic pressure equals 24 mm  Hg, assuming right atrial pressure equals 8 mm Hg,  consistent with normal pulmonary pressures.  ------------------------------------------------------------------------  Conclusions:  1. Calcified trileaflet aortic valve with normal opening.  Peak transaortic valve gradient equals 10 mm Hg, mean  transaortic valve gradient equals 6 mm Hg, aortic valve  velocity time integral equals 40 cm, estimated aortic valve  area equals 2.1 sqcm.  2. Endocardial visualization enhanced with intravenous  injection of Ultrasonic Enhancing Agent (Definity).  moderate to severe segmental left ventricular systolic  dysfunction. There is hypokinesis of anterior wall, septum  and apex. Visual estimate of EF about 35%.  3. Normal right ventricular size with decreased right  ventricular systolic function.  4. Normal tricuspid valve. Mild tricuspid regurgitation.  5. Estimated pulmonary artery systolic pressure equals 24  mm Hg, assuming right atrial pressure equals 8  mm Hg,  consistent with normal pulmonary pressures.  6. Bilateral pleural effusions.  ------------------------------------------------------------------------  Confirmed on  10/21/2021 - 19:08:16 by Aicha Pettit M.D.  -----    < end of copied text >

## 2021-10-25 NOTE — CONSULT NOTE ADULT - SUBJECTIVE AND OBJECTIVE BOX
Patient is a 71y old  Male who presents with a chief complaint of leg swelling (25 Oct 2021 13:34)      HPI:  70yo M w/ PMHx of CAD (s/p CABG 2019), CKD (unknown stage), DM2, Parkinson's Disease, HTN, depression presents with bilateral leg swelling, patient's daughter in-law at bedside Yoly Quintero (4 [employer] [facility] Nurse) provides the history, the daughter reports the patient is a poor historian, unable to remember having conversations with others and likely cannot weigh risk/benefits of medical conditions, she reports that he has had bilateral lower extremity swelling for the past few months, but over the past 2 weeks it has significantly worsened, he has further difficulty ambulating due to swelling, his outpatient provider attempted to manage with 20mg lasix and then increased to 40mg lasix but the patient never took the increased dose, his symptoms ar persistent throughout the day and are extremely severe, he has developed wounds of the legs with weeping of fluid due to the swelling, she reports that the patient is frequently non-compliant with medications and medical management, he lives at home with his son and daughter in law, he denies chest pain, shortness of breath, fever/chills, cough, sputum, in the ED, he was tachycardic but hemodynamically stable, afebrile, saturating well on room air, labs were significant for elevated BNP, elevated creatinine, imaging showed moderate left pleural effusion, patient was admitted to general medicine for further management  (21 Oct 2021 07:06)    Podiatry consulted for painful elongated toenails.    PAST MEDICAL & SURGICAL HISTORY:  Hypertension    Diabetes Mellitus, Type 2    Hypercholesterolemia    Parkinson disease    CAD (coronary artery disease)  s/p 1 stent in 2010 and CABG 2019    S/P CABG (coronary artery bypass graft)  2019    S/P hip replacement, right  2016    Anxiety    Depression, major        MEDICATIONS  (STANDING):  albuterol/ipratropium for Nebulization 3 milliLiter(s) Nebulizer every 6 hours  ascorbic acid 500 milliGRAM(s) Oral daily  aspirin enteric coated 81 milliGRAM(s) Oral daily  atorvastatin 80 milliGRAM(s) Oral at bedtime  calcium acetate 1334 milliGRAM(s) Oral three times a day with meals  carbidopa/levodopa  25/100 2 Tablet(s) Oral three times a day  cholecalciferol 1000 Unit(s) Oral daily  dextrose 40% Gel 15 Gram(s) Oral once  dextrose 5%. 1000 milliLiter(s) (50 mL/Hr) IV Continuous <Continuous>  dextrose 5%. 1000 milliLiter(s) (100 mL/Hr) IV Continuous <Continuous>  dextrose 50% Injectable 25 Gram(s) IV Push once  dextrose 50% Injectable 12.5 Gram(s) IV Push once  dextrose 50% Injectable 25 Gram(s) IV Push once  enoxaparin Injectable 100 milliGRAM(s) SubCutaneous daily  folic acid 1 milliGRAM(s) Oral daily  furosemide   Injectable 60 milliGRAM(s) IV Push two times a day  glucagon  Injectable 1 milliGRAM(s) IntraMuscular once  insulin glargine Injectable (LANTUS) 6 Unit(s) SubCutaneous at bedtime  insulin lispro (ADMELOG) corrective regimen sliding scale   SubCutaneous three times a day before meals  insulin lispro (ADMELOG) corrective regimen sliding scale   SubCutaneous at bedtime  metoprolol tartrate 25 milliGRAM(s) Oral two times a day  Nephro-celeste 1 Tablet(s) Oral daily  trihexyphenidyl 2 milliGRAM(s) Oral three times a day    MEDICATIONS  (PRN):  acetaminophen     Tablet .. 650 milliGRAM(s) Oral every 6 hours PRN Mild Pain (1 - 3)  guaiFENesin Oral Liquid (Sugar-Free) 200 milliGRAM(s) Oral every 6 hours PRN Cough      Allergies    adhesives (Rash)  latex (Urticaria)  No Known Drug Allergies    Intolerances        VITALS:    Vital Signs Last 24 Hrs  T(C): 36.4 (25 Oct 2021 20:04), Max: 36.8 (25 Oct 2021 19:20)  T(F): 97.6 (25 Oct 2021 20:04), Max: 98.2 (25 Oct 2021 19:20)  HR: 103 (25 Oct 2021 20:04) (75 - 110)  BP: 126/73 (25 Oct 2021 20:04) (103/78 - 139/69)  BP(mean): --  RR: 18 (25 Oct 2021 20:04) (18 - 18)  SpO2: 95% (25 Oct 2021 20:04) (95% - 98%)    LABS:                          6.1    11.79 )-----------( 217      ( 25 Oct 2021 09:06 )             19.0       10-25    131<L>  |  97  |  132<H>  ----------------------------<  132<H>  5.1   |  14<L>  |  7.23<H>    Ca    9.2      25 Oct 2021 07:37  Phos  7.2     10-25  Mg     2.2     10-25    TPro  6.9  /  Alb  3.0<L>  /  TBili  0.2  /  DBili  x   /  AST  9<L>  /  ALT  <5<L>  /  AlkPhos  73  10-25      CAPILLARY BLOOD GLUCOSE      POCT Blood Glucose.: 138 mg/dL (25 Oct 2021 21:03)  POCT Blood Glucose.: 159 mg/dL (25 Oct 2021 16:22)  POCT Blood Glucose.: 172 mg/dL (25 Oct 2021 11:52)  POCT Blood Glucose.: 148 mg/dL (25 Oct 2021 07:34)      PTT - ( 25 Oct 2021 07:24 )  PTT:80.8 sec    LOWER EXTREMITY PHYSICAL EXAM:    Vasular: DP/PT non palp possibly due to edema, B/L, CFT <3 seconds B/L, Temperature gradient WNL, B/L  Neuro: Epicritic sensation decreased to the level of foot, B/L.  Musculoskeletal/Ortho: pain with palp of toes bilat  Skin: toenails thickened elongated dystrophic with subungual debris and painful x10

## 2021-10-25 NOTE — PROGRESS NOTE ADULT - SUBJECTIVE AND OBJECTIVE BOX
[facility] Cardiology Progress Note  _______________________________    Pt. seen and examined. sleeping.    Telemetry -atrial flutter 70-120s    T(C): 36.7 (10-25-21 @ 08:33), Max: 37.2 (10-24-21 @ 11:24)  HR: 78 (10-25-21 @ 08:33) (78 - 110)  BP: 103/78 (10-25-21 @ 08:33) (103/78 - 127/72)  RR: 18 (10-25-21 @ 08:33) (17 - 18)  SpO2: 95% (10-25-21 @ 08:33) (95% - 98%)  I&O's Summary    24 Oct 2021 07:01  -  25 Oct 2021 07:00  --------------------------------------------------------  IN: 200 mL / OUT: 1000 mL / NET: -800 mL        PHYSICAL EXAM:  GENERAL: NAD.  NECK: Supple  CHEST/LUNG: Clear to anterior auscultation bilaterally; No significant wheezes, rales, or rhonchi.  HEART: S1 S2 normal, RRR; No murmurs, rubs, or gallops  ABDOMEN: Soft, Nondistended  EXTREMITIES:  +1 LE edema.      LABS:                        6.3    11.66 )-----------( 221      ( 25 Oct 2021 07:24 )             19.6     10-25    131<L>  |  97  |  x   ----------------------------<  132<H>  5.1   |  14<L>  |  7.23<H>    Ca    9.2      25 Oct 2021 07:37  Phos  7.2     10-25  Mg     2.2     10-25    TPro  6.9  /  Alb  3.0<L>  /  TBili  0.2  /  DBili  x   /  AST  9<L>  /  ALT  <5<L>  /  AlkPhos  73  10-25    PTT - ( 25 Oct 2021 07:24 )  PTT:80.8 sec  CARDIAC MARKERS ( 21 Oct 2021 07:14 )  x     / x     / 123 U/L / x     / 5.4 ng/mL              MEDICATIONS  (STANDING):  albuterol/ipratropium for Nebulization 3 milliLiter(s) Nebulizer every 6 hours  ascorbic acid 500 milliGRAM(s) Oral daily  aspirin enteric coated 81 milliGRAM(s) Oral daily  atorvastatin 80 milliGRAM(s) Oral at bedtime  calcium acetate 667 milliGRAM(s) Oral three times a day with meals  carbidopa/levodopa  25/100 2 Tablet(s) Oral three times a day  cholecalciferol 1000 Unit(s) Oral daily  dextrose 40% Gel 15 Gram(s) Oral once  dextrose 5%. 1000 milliLiter(s) (50 mL/Hr) IV Continuous <Continuous>  dextrose 5%. 1000 milliLiter(s) (100 mL/Hr) IV Continuous <Continuous>  dextrose 50% Injectable 25 Gram(s) IV Push once  dextrose 50% Injectable 12.5 Gram(s) IV Push once  dextrose 50% Injectable 25 Gram(s) IV Push once  enoxaparin Injectable 100 milliGRAM(s) SubCutaneous daily  folic acid 1 milliGRAM(s) Oral daily  furosemide   Injectable 60 milliGRAM(s) IV Push two times a day  glucagon  Injectable 1 milliGRAM(s) IntraMuscular once  insulin glargine Injectable (LANTUS) 6 Unit(s) SubCutaneous at bedtime  insulin lispro (ADMELOG) corrective regimen sliding scale   SubCutaneous three times a day before meals  insulin lispro (ADMELOG) corrective regimen sliding scale   SubCutaneous at bedtime  metoprolol tartrate 25 milliGRAM(s) Oral two times a day  Nephro-celeste 1 Tablet(s) Oral daily  trihexyphenidyl 2 milliGRAM(s) Oral three times a day    MEDICATIONS  (PRN):  acetaminophen     Tablet .. 650 milliGRAM(s) Oral every 6 hours PRN Mild Pain (1 - 3)  guaiFENesin Oral Liquid (Sugar-Free) 200 milliGRAM(s) Oral every 6 hours PRN Cough        RADIOLOGY & ADDITIONAL TESTS:

## 2021-10-25 NOTE — PROGRESS NOTE ADULT - PROBLEM SELECTOR PLAN 7
likely anemia of ckd, chr dz  check iron studies  no e/o active bleed, monitor while on AC  transfuse for hb<7 or symptoms    acute on chronic anemia, rt groin pain cannot r/o active intramuscular/RP bleed  cta/p fu, monitor hb arnold

## 2021-10-25 NOTE — PROGRESS NOTE ADULT - PROBLEM SELECTOR PLAN 3
-  - h/o CABG.  - medical management given renal function and significant anemia. if renal function and anemia stabilizes then will reconsider invasive evaluation such as cardiac cath.   - cont ASA to 81 mg.  - cont high intensity statin.

## 2021-10-25 NOTE — PROGRESS NOTE ADULT - SUBJECTIVE AND OBJECTIVE BOX
Chief complaint  Patient is a 71y old  Male who presents with a chief complaint of leg swelling (25 Oct 2021 09:46)   Review of systems  Patient awake in bed, looks comfortable, no hypoglycemic episodes.    Labs and Fingersticks  CAPILLARY BLOOD GLUCOSE      POCT Blood Glucose.: 172 mg/dL (25 Oct 2021 11:52)  POCT Blood Glucose.: 148 mg/dL (25 Oct 2021 07:34)  POCT Blood Glucose.: 158 mg/dL (24 Oct 2021 21:15)  POCT Blood Glucose.: 169 mg/dL (24 Oct 2021 16:18)    Medications  MEDICATIONS  (STANDING):  albuterol/ipratropium for Nebulization 3 milliLiter(s) Nebulizer every 6 hours  ascorbic acid 500 milliGRAM(s) Oral daily  aspirin enteric coated 81 milliGRAM(s) Oral daily  atorvastatin 80 milliGRAM(s) Oral at bedtime  calcium acetate 1334 milliGRAM(s) Oral three times a day with meals  carbidopa/levodopa  25/100 2 Tablet(s) Oral three times a day  cholecalciferol 1000 Unit(s) Oral daily  dextrose 40% Gel 15 Gram(s) Oral once  dextrose 5%. 1000 milliLiter(s) (50 mL/Hr) IV Continuous <Continuous>  dextrose 5%. 1000 milliLiter(s) (100 mL/Hr) IV Continuous <Continuous>  dextrose 50% Injectable 25 Gram(s) IV Push once  dextrose 50% Injectable 12.5 Gram(s) IV Push once  dextrose 50% Injectable 25 Gram(s) IV Push once  enoxaparin Injectable 100 milliGRAM(s) SubCutaneous daily  epoetin mari-epbx (RETACRIT) Injectable 14757 Unit(s) SubCutaneous once  folic acid 1 milliGRAM(s) Oral daily  furosemide   Injectable 60 milliGRAM(s) IV Push two times a day  furosemide   Injectable 40 milliGRAM(s) IV Push once  glucagon  Injectable 1 milliGRAM(s) IntraMuscular once  insulin glargine Injectable (LANTUS) 6 Unit(s) SubCutaneous at bedtime  insulin lispro (ADMELOG) corrective regimen sliding scale   SubCutaneous three times a day before meals  insulin lispro (ADMELOG) corrective regimen sliding scale   SubCutaneous at bedtime  metoprolol tartrate 25 milliGRAM(s) Oral two times a day  Nephro-celeste 1 Tablet(s) Oral daily  trihexyphenidyl 2 milliGRAM(s) Oral three times a day      Physical Exam  General: Patient comfortable in bed  Vital Signs Last 12 Hrs  T(F): 98.1 (10-25-21 @ 11:50), Max: 98.1 (10-25-21 @ 04:58)  HR: 84 (10-25-21 @ 11:50) (75 - 110)  BP: 128/72 (10-25-21 @ 11:50) (103/78 - 138/69)  BP(mean): --  RR: 18 (10-25-21 @ 11:50) (18 - 18)  SpO2: 97% (10-25-21 @ 11:50) (95% - 98%)    Diagnostics    Free Thyroxine, Serum: AM Sched. Collection: 26-Oct-2021 06:00 (10-25 @ 11:45)  Thyroid Stimulating Hormone, Serum: AM Sched. Collection: 26-Oct-2021 06:00 (10-25 @ 11:45)  Cortisol AM, Serum: 08:00 (10-22 @ 12:47)  Free Thyroxine, Serum: AM Sched. Collection: 23-Oct-2021 06:00 (10-22 @ 12:43)  Thyroid Stimulating Hormone, Serum: AM Sched. Collection: 23-Oct-2021 06:00 (10-22 @ 12:43)         Chief complaint  Patient is a 71y old  Male who presents with a chief complaint of leg swelling (25 Oct 2021 09:46)   Review of systems  Patient awake in bed, looks comfortable, no hypoglycemic episodes.    Labs and Fingersticks  CAPILLARY BLOOD GLUCOSE    POCT Blood Glucose.: 172 mg/dL (25 Oct 2021 11:52)  POCT Blood Glucose.: 148 mg/dL (25 Oct 2021 07:34)  POCT Blood Glucose.: 158 mg/dL (24 Oct 2021 21:15)  POCT Blood Glucose.: 169 mg/dL (24 Oct 2021 16:18)    Medications  MEDICATIONS  (STANDING):  albuterol/ipratropium for Nebulization 3 milliLiter(s) Nebulizer every 6 hours  ascorbic acid 500 milliGRAM(s) Oral daily  aspirin enteric coated 81 milliGRAM(s) Oral daily  atorvastatin 80 milliGRAM(s) Oral at bedtime  calcium acetate 1334 milliGRAM(s) Oral three times a day with meals  carbidopa/levodopa  25/100 2 Tablet(s) Oral three times a day  cholecalciferol 1000 Unit(s) Oral daily  dextrose 40% Gel 15 Gram(s) Oral once  dextrose 5%. 1000 milliLiter(s) (50 mL/Hr) IV Continuous <Continuous>  dextrose 5%. 1000 milliLiter(s) (100 mL/Hr) IV Continuous <Continuous>  dextrose 50% Injectable 25 Gram(s) IV Push once  dextrose 50% Injectable 12.5 Gram(s) IV Push once  dextrose 50% Injectable 25 Gram(s) IV Push once  enoxaparin Injectable 100 milliGRAM(s) SubCutaneous daily  epoetin mari-epbx (RETACRIT) Injectable 22031 Unit(s) SubCutaneous once  folic acid 1 milliGRAM(s) Oral daily  furosemide   Injectable 60 milliGRAM(s) IV Push two times a day  furosemide   Injectable 40 milliGRAM(s) IV Push once  glucagon  Injectable 1 milliGRAM(s) IntraMuscular once  insulin glargine Injectable (LANTUS) 6 Unit(s) SubCutaneous at bedtime  insulin lispro (ADMELOG) corrective regimen sliding scale   SubCutaneous three times a day before meals  insulin lispro (ADMELOG) corrective regimen sliding scale   SubCutaneous at bedtime  metoprolol tartrate 25 milliGRAM(s) Oral two times a day  Nephro-celeste 1 Tablet(s) Oral daily  trihexyphenidyl 2 milliGRAM(s) Oral three times a day      Physical Exam  General: Patient comfortable in bed  Vital Signs Last 12 Hrs  T(F): 98.1 (10-25-21 @ 11:50), Max: 98.1 (10-25-21 @ 04:58)  HR: 84 (10-25-21 @ 11:50) (75 - 110)  BP: 128/72 (10-25-21 @ 11:50) (103/78 - 138/69)  BP(mean): --  RR: 18 (10-25-21 @ 11:50) (18 - 18)  SpO2: 97% (10-25-21 @ 11:50) (95% - 98%)    Diagnostics    Free Thyroxine, Serum: AM Sched. Collection: 26-Oct-2021 06:00 (10-25 @ 11:45)  Thyroid Stimulating Hormone, Serum: AM Sched. Collection: 26-Oct-2021 06:00 (10-25 @ 11:45)  Cortisol AM, Serum: 08:00 (10-22 @ 12:47)  Free Thyroxine, Serum: AM Sched. Collection: 23-Oct-2021 06:00 (10-22 @ 12:43)  Thyroid Stimulating Hormone, Serum: AM Sched. Collection: 23-Oct-2021 06:00 (10-22 @ 12:43)

## 2021-10-25 NOTE — PROGRESS NOTE ADULT - PROBLEM SELECTOR PLAN 1
Will continue current insulin regimen for now. Will continue monitoring FS, log, and FU.  Patient previously on larger dose insulin (Tresiba and Novolog), will continue monitoring and FU DC recommendations.  Patient counseled for compliance with consistent low carb diet and exercise as tolerated outpatient.

## 2021-10-25 NOTE — PROVIDER CONTACT NOTE (CRITICAL VALUE NOTIFICATION) - ACTION/TREATMENT ORDERED:
Provider notified, no new orders made. Will continue to monitor. Provider notified, awaiting orders. Will continue to monitor.

## 2021-10-25 NOTE — CHART NOTE - NSCHARTNOTEFT_GEN_A_CORE
Notified by RN for Hgb drop to 6.3. Repeat CBC confirmed Hgb at 6.1.  Consent obtained from patient and HCP, agrees on blood transfusion  Plan for transfuse 1/2 unit x 2 and IV Lasix 40 x 1 in between transfusion as per Cards  FOBT with BM regimen ordered   Dr. Pineda made aware of the clinical status and agrees on the plan above    Divina Jacinto PA-C Notified by RN for Hgb drop to 6.3. Repeat CBC confirmed Hgb at 6.1.  Consent obtained from patient and HCP, agrees on blood transfusion  Plan for transfuse 1/2 unit x 2 and IV Lasix 40 x 1 in between transfusion as per Cards  FOBT with BM regimen ordered   Dr. Pineda made aware of the clinical status and agrees on the plan above    addendum   - As per Dr. Pineda, patient endorses groin pain. CT A/P ordered for further workup     Divina Jacinto PA-C

## 2021-10-25 NOTE — PROGRESS NOTE ADULT - ASSESSMENT
Assessment  DMT2: 71y Male with DM T2 with hyperglycemia, A1C 6.4%, was on insulin at home, now on low-dose basal insulin and coverage, blood sugars are trending within overall acceptable range, no hypoglycemias. Patient appears comfortable in NAD, eating partial meals, anemic, planning transfusion.  CHF: on medications, stable, monitored.  HTN: Controlled,  on antihypertensive medications.  Parkinsons: on meds, monitored.  CKD: Monitor labs/BMP      Kelsie Reece MD  Cell: [phone]  Office: [phone]     Assessment  DMT2: 71y Male with DM T2 with hyperglycemia, A1C 6.4%, was on insulin at home, now on low-dose basal insulin and coverage, blood sugars are trending within overall acceptable range, no hypoglycemias. Patient appears comfortable in NAD,  eating partial meals, anemic, planning transfusion.  CHF: on medications, stable, monitored.  HTN: Controlled,  on antihypertensive medications.  Parkinsons: on meds, monitored.  CKD: Monitor labs/BMP      Kelsie Reece MD  Cell: [phone]  Office: [phone]

## 2021-10-25 NOTE — PROGRESS NOTE ADULT - ASSESSMENT
70yo M w/ PMHx of CAD (s/p CABG 2019), CKD (unknown stage), DM2, Parkinson's Disease, HTN, depression presents with bilateral leg swelling  Mild CHF  Acute on chronic renal failure --getting worse   Hyperphosphatemia  Metabolic acidosis   Severe LV dysfucntion     1 -Renal sono noted     2 Renal- Advanced renal failure and  I suspect he will need HD.  Will dw family ;  Cont IV lasix as ordered. Monitor output, trend BMP  Quantify proteinuria burden   Increase Phoslo 1337 tid    3 [facility] f/u noted;  On Lopressor  bid    4 Anemia- s/p [id number] mg IV x 1 ;  Retacrit but he will likely need blood         70yo M w/ PMHx of CAD (s/p CABG 2019), CKD (unknown stage), DM2, Parkinson's Disease, HTN, depression presents with bilateral leg swelling  Mild CHF  Acute on chronic renal failure --getting worse   Hyperphosphatemia  Metabolic acidosis   Severe LV dysfucntion     1 -Renal sono noted     2 Renal- Advanced renal failure and  I suspect he will need HD.  Will dw family ;  Cont IV lasix as ordered. Monitor output, trend BMP  Quantify proteinuria burden   Increase Phoslo 1337 tid    3 [facility]ards f/u noted;  On Lopressor  bid    4 Anemia- s/p [id number] mg IV x 1 ;  Retacrit but he will likely need blood        Sayed [facility]   [employer] [facility]   [phone]

## 2021-10-26 NOTE — PROGRESS NOTE ADULT - PROBLEM SELECTOR PLAN 2
-  - heparin drip held due to profound anemia. resume once hgb stable.   - rates are controlled. Cont metoprolol.

## 2021-10-26 NOTE — CHART NOTE - NSCHARTNOTEFT_GEN_A_CORE
Patient Hgb noted to be at 6.8 s/p 1.5 unit PRBC. Patient seen and evaluated at the bedside. Reports nausea, weakness  Another 1 unit PRBC ordered   Worsening ABBY on CKD - now requiring HD  HD consent obtained today and awaiting shiley placement followed by 1st HD today as per Renal    Given Nausea and groin pain, CT A/P ordered from yesterday.  Patient reports severe claustrophobia. Risks and benefits explained to the patient and his HCP, Yoly, they agree with proceeding with CT A/P with IV Ativan 1mg.    Given ESRD, Lovenox switched to Hep gtt. As per IR, plan to start Hep gtt after Shiley placement  Dr. Pineda made aware and agrees on the plan above    Divina Jacinto PA-C

## 2021-10-26 NOTE — PROGRESS NOTE ADULT - PROBLEM SELECTOR PLAN 2
Will start on synthroid 25 mcg po daily. Will continue monitoring and FU.  Suggest to FU outpatient in 4-6 weeks to repeat TFTs.  Discussed plan with patient.

## 2021-10-26 NOTE — PROGRESS NOTE ADULT - ASSESSMENT
70yo M w/ PMHx of CAD (s/p CABG 2019), CKD (unknown stage), DM2, Parkinson's Disease, HTN, depression presents with bilateral leg swelling  Mild CHF  Acute on chronic renal failure --getting worse   Hyperphosphatemia  Metabolic acidosis   Severe LV dysfucntion     1 IR- Shiley or perm cath today     2 Renal- Consent for  HD will be obtained by elsy(HCP).  DC IV lasix as ordered. Monitor output, trend BMP  Quantify proteinuria burden   Increase Phoslo 1337 tid    3 [facility]ards f/u noted;  On Lopressor  bid    4 Anemia-  ;  Retacrit at HD        Sayed [employer]   [phone]

## 2021-10-26 NOTE — PROGRESS NOTE ADULT - SUBJECTIVE AND OBJECTIVE BOX
NEPHROLOGY-NS[facility] [phone]        Patient seen and examined in bed.  He was about the same         MEDICATIONS  (STANDING):  albuterol/ipratropium for Nebulization 3 milliLiter(s) Nebulizer every 6 hours  ascorbic acid 500 milliGRAM(s) Oral daily  aspirin enteric coated 81 milliGRAM(s) Oral daily  atorvastatin 80 milliGRAM(s) Oral at bedtime  calcium acetate 1334 milliGRAM(s) Oral three times a day with meals  carbidopa/levodopa  25/100 2 Tablet(s) Oral three times a day  cholecalciferol 1000 Unit(s) Oral daily  dextrose 40% Gel 15 Gram(s) Oral once  dextrose 5%. 1000 milliLiter(s) (50 mL/Hr) IV Continuous <Continuous>  dextrose 5%. 1000 milliLiter(s) (100 mL/Hr) IV Continuous <Continuous>  dextrose 50% Injectable 25 Gram(s) IV Push once  dextrose 50% Injectable 12.5 Gram(s) IV Push once  dextrose 50% Injectable 25 Gram(s) IV Push once  enoxaparin Injectable 100 milliGRAM(s) SubCutaneous daily  folic acid 1 milliGRAM(s) Oral daily  furosemide   Injectable 60 milliGRAM(s) IV Push two times a day  furosemide   Injectable 40 milliGRAM(s) IV Push once  glucagon  Injectable 1 milliGRAM(s) IntraMuscular once  insulin glargine Injectable (LANTUS) 6 Unit(s) SubCutaneous at bedtime  insulin lispro (ADMELOG) corrective regimen sliding scale   SubCutaneous three times a day before meals  insulin lispro (ADMELOG) corrective regimen sliding scale   SubCutaneous at bedtime  metoprolol tartrate 25 milliGRAM(s) Oral two times a day  Nephro-celeste 1 Tablet(s) Oral daily  trihexyphenidyl 2 milliGRAM(s) Oral three times a day      VITAL:  T(C): , Max: 36.8 (10-25-21 @ 19:20)  T(F): , Max: 98.2 (10-25-21 @ 19:20)  HR: 101 (10-26-21 @ 05:30)  BP: 119/69 (10-26-21 @ 08:12)  BP(mean): --  RR: 18 (10-26-21 @ 05:30)  SpO2: 96% (10-26-21 @ 05:30)  Wt(kg): --    I and O's:    10-25 @ 07:01  -  10-26 @ 07:00  --------------------------------------------------------  IN: 1630 mL / OUT: 1900 mL / NET: -270 mL          PHYSICAL EXAM:    Constitutional: NAD  Neck:  No JVD  Respiratory: CTAB/L  Cardiovascular: S1 and S2  Gastrointestinal: BS+, soft, NT/ND  Extremities: No peripheral edema  Neurological: A/O x 3, no focal deficits  Psychiatric: Normal mood, normal affect  : No Javier  Skin: No rashes  Access: Not applicable    LABS:                        6.8    12.99 )-----------( 221      ( 26 Oct 2021 07:17 )             20.8     10-26    134<L>  |  99  |  140<H>  ----------------------------<  124<H>  4.9   |  15<L>  |  7.43<H>    Ca    9.3      26 Oct 2021 07:45  Phos  7.2     10-25  Mg     2.2     10-25    TPro  6.9  /  Alb  3.0<L>  /  TBili  0.2  /  DBili  x   /  AST  9<L>  /  ALT  <5<L>  /  AlkPhos  73  10-25          Urine Studies:          RADIOLOGY & ADDITIONAL STUDIES:

## 2021-10-26 NOTE — PROGRESS NOTE ADULT - PROBLEM SELECTOR PLAN 6
likely worsening CKD  renal cs appreciated  i/o, volume overloaded  chf mgmt as above  awaiting natashaley placed for 1st HD today likely worsening CKD  renal cs appreciated  i/o, volume overloaded  chf mgmt as above  sp bridget placed for 1st HD today

## 2021-10-26 NOTE — CONSULT NOTE ADULT - SUBJECTIVE AND OBJECTIVE BOX
Interventional Radiology    Evaluate for Procedure: Non-tunneled HD catheter placement    HPI: 71 h/o CAD (s/p CABG 2019), CKD, DM2, Parkinson's Disease, HTN, depression  a/w LE edema, found to have NSTEMI, cardiomyopathy, ABBY on CKD, Aflutter, now requiring venous access for new initiation of HD referred to IR for non-tunneled HD catheter placement.    Allergies:   adhesives (Rash)  latex (Urticaria)    Medications (Abx/Cardiac/Anticoagulation/Blood Products)  aspirin enteric coated: 81 milliGRAM(s) Oral (10-25 @ 12:13)  enoxaparin Injectable: 100 milliGRAM(s) SubCutaneous (10-25 @ 12:13)  furosemide   Injectable: 60 milliGRAM(s) IV Push (10-26 @ 05:37)  furosemide   Injectable: 40 milliGRAM(s) IV Push (10-26 @ 03:51)  furosemide   Injectable: 40 milliGRAM(s) IV Push (10-25 @ 15:16)  metoprolol tartrate: 25 milliGRAM(s) Oral (10-26 @ 05:32)    Data:  T(C): 36.7  HR: 101  BP: 119/69  RR: 18  SpO2: 96%    -WBC 12.99 / HgB 6.8 / Hct 20.8 / Plt 221  -Na 134 / Cl 99 /  / Glucose 124  -K 4.9 / CO2 15 / Cr 7.43  -ALT -- / Alk Phos -- / T.Bili --  -INR -- / PTT 80.8    Assessment/Plan:   71 h/o CAD (s/p CABG 2019), CKD, DM2, Parkinson's Disease, HTN, depression  a/w LE edema, found to have NSTEMI, cardiomyopathy, ABBY on CKD, Aflutter, now requiring venous access for new initiation of HD referred to IR for non-tunneled HD catheter placement.    -case reviewed and approved for today PENDING negative COVID swab (must be within 5 days of procedure)  -medically optimize pt (transfuse PRBC)  -hold AC  -please place IR procedure order under ZACH Pettit  -d/w primary team    Please contact IR with any questions or concerns.

## 2021-10-26 NOTE — PROGRESS NOTE ADULT - PROBLEM SELECTOR PLAN 5
-  - cont heparin gtt.  - medical management given renal function and significant anemia. if renal function and anemia stabilizes then will reconsider invasive evaluation such as cardiac cath.   - cont ASA, statin, BB.    [facility]

## 2021-10-26 NOTE — PROGRESS NOTE ADULT - SUBJECTIVE AND OBJECTIVE BOX
SUBJECTIVE / OVERNIGHT EVENTS:    hgb 6.8 s/p 1.5 unit PRBC.  + nausea, weakness  Another 1 unit PRBC ordered   Worsening ABBY on CKD - now requiring HD  HD consent obtained today and awaiting shiley placement followed by 1st HD today as per Renal    --------------------------------------------------------------------------------------------  LABS:                        6.8    12.99 )-----------( 221      ( 26 Oct 2021 07:17 )             20.8     10-26    134<L>  |  99  |  140<H>  ----------------------------<  124<H>  4.9   |  15<L>  |  7.43<H>    Ca    9.3      26 Oct 2021 07:45  Phos  7.2     10-25  Mg     2.2     10-25    TPro  6.9  /  Alb  3.0<L>  /  TBili  0.2  /  DBili  x   /  AST  9<L>  /  ALT  <5<L>  /  AlkPhos  73  10-25    PT/INR - ( 26 Oct 2021 11:06 )   PT: 13.2 sec;   INR: 1.11 ratio         PTT - ( 26 Oct 2021 11:06 )  PTT:79.0 sec  CAPILLARY BLOOD GLUCOSE      POCT Blood Glucose.: 135 mg/dL (26 Oct 2021 11:36)  POCT Blood Glucose.: 154 mg/dL (26 Oct 2021 07:31)  POCT Blood Glucose.: 138 mg/dL (25 Oct 2021 21:03)  POCT Blood Glucose.: 159 mg/dL (25 Oct 2021 16:22)            RADIOLOGY & ADDITIONAL TESTS:    Imaging Personally Reviewed:  [x] YES  [ ] NO    Consultant(s) Notes Reviewed:  [x] YES  [ ] NO    MEDICATIONS  (STANDING):  albuterol/ipratropium for Nebulization 3 milliLiter(s) Nebulizer every 6 hours  ascorbic acid 500 milliGRAM(s) Oral daily  aspirin enteric coated 81 milliGRAM(s) Oral daily  atorvastatin 80 milliGRAM(s) Oral at bedtime  calcium acetate 1334 milliGRAM(s) Oral three times a day with meals  carbidopa/levodopa  25/100 2.5 Tablet(s) Oral <User Schedule>  carbidopa/levodopa  25/100 2 Tablet(s) Oral <User Schedule>  chlorhexidine 2% Cloths 1 Application(s) Topical daily  cholecalciferol 1000 Unit(s) Oral daily  dextrose 40% Gel 15 Gram(s) Oral once  dextrose 5%. 1000 milliLiter(s) (50 mL/Hr) IV Continuous <Continuous>  dextrose 5%. 1000 milliLiter(s) (100 mL/Hr) IV Continuous <Continuous>  dextrose 50% Injectable 25 Gram(s) IV Push once  dextrose 50% Injectable 12.5 Gram(s) IV Push once  dextrose 50% Injectable 25 Gram(s) IV Push once  epoetin mari-epbx (RETACRIT) Injectable 77586 Unit(s) IV Push once  folic acid 1 milliGRAM(s) Oral daily  furosemide   Injectable 60 milliGRAM(s) IV Push two times a day  glucagon  Injectable 1 milliGRAM(s) IntraMuscular once  heparin  Infusion.  Unit(s)/Hr (19 mL/Hr) IV Continuous <Continuous>  insulin glargine Injectable (LANTUS) 6 Unit(s) SubCutaneous at bedtime  insulin lispro (ADMELOG) corrective regimen sliding scale   SubCutaneous three times a day before meals  insulin lispro (ADMELOG) corrective regimen sliding scale   SubCutaneous at bedtime  levothyroxine 25 MICROGram(s) Oral daily  metoprolol tartrate 25 milliGRAM(s) Oral two times a day  mupirocin 2% Ointment 1 Application(s) Both Nostrils two times a day  Nephro-celeste 1 Tablet(s) Oral daily  trihexyphenidyl 2 milliGRAM(s) Oral three times a day    MEDICATIONS  (PRN):  acetaminophen     Tablet .. 650 milliGRAM(s) Oral every 6 hours PRN Mild Pain (1 - 3)  guaiFENesin Oral Liquid (Sugar-Free) 200 milliGRAM(s) Oral every 6 hours PRN Cough  heparin   Injectable 9000 Unit(s) IV Push every 6 hours PRN For aPTT less than 40  heparin   Injectable 4000 Unit(s) IV Push every 6 hours PRN For aPTT between 40 - 57      Care Discussed with Consultants/Other Providers [x] YES  [ ] NO    Vital Signs Last 24 Hrs  T(C): 36.9 (26 Oct 2021 15:18), Max: 36.9 (26 Oct 2021 15:18)  T(F): 98.4 (26 Oct 2021 15:18), Max: 98.4 (26 Oct 2021 15:18)  HR: 93 (26 Oct 2021 15:18) (62 - 104)  BP: 133/77 (26 Oct 2021 15:18) (74/40 - 156/76)  BP(mean): --  RR: 18 (26 Oct 2021 15:18) (18 - 18)  SpO2: 94% (26 Oct 2021 15:18) (94% - 98%)  I&O's Summary    25 Oct 2021 07:01  -  26 Oct 2021 07:00  --------------------------------------------------------  IN: 1630 mL / OUT: 1900 mL / NET: -270 mL    26 Oct 2021 07:01  -  26 Oct 2021 15:53  --------------------------------------------------------  IN: 200 mL / OUT: 700 mL / NET: -500 mL    PHYSICAL EXAM:  GENERAL: NAD,   EYES: conjunctiva and sclera clear  ENMT: Moist mucous membranes  NECK: Supple, No JVD, Normal thyroid  CHEST/LUNG: non labored, cta b/l  HEART: Regular rate and rhythm; No murmurs, rubs, or gallops  ABDOMEN: Soft, Nontender, Nondistended; Bowel sounds present  EXTREMITIES:  2+ Peripheral Pulses, No clubbing, cyanosis, or edema  LYMPH: No lymphadenopathy noted  SKIN: No rashes or lesions

## 2021-10-26 NOTE — PROGRESS NOTE ADULT - ASSESSMENT
Assessment  DMT2: 71y Male with DM T2 with hyperglycemia, A1C 6.4%, was on insulin at home, now on low-dose basal insulin and coverage, blood sugars are trending within acceptable range, no hypoglycemias, eating partial meals, c/o back pain requesting pain meds.  Hypothyroidism: Patient has no history thyroid disease, was not on any thyroid supplements, subclinical with low-normal FT4, lethargic.  CHF: on medications, stable, monitored.  HTN: Controlled,  on antihypertensive medications.  Parkinsons: on meds, monitored.  CKD: Monitor labs/BMP      Kelsie Reece MD  Cell: [phone]  Office: [phone]     Assessment  DMT2: 71y Male with DM T2 with hyperglycemia, A1C 6.4%, was on insulin at home, now on low-dose basal insulin and coverage, blood sugars are trending within acceptable range, no hypoglycemias, eating partial  meals, c/o back pain requesting pain meds.  Hypothyroidism: Patient has no history thyroid disease, was not on any thyroid supplements, subclinical with low-normal FT4, lethargic.  CHF: on medications, stable, monitored.  HTN: Controlled,  on antihypertensive medications.  Parkinsons: on meds, monitored.  CKD: Monitor labs/BMP      Kelsie Reece MD  Cell: [phone]  Office: [phone]

## 2021-10-26 NOTE — PROGRESS NOTE ADULT - SUBJECTIVE AND OBJECTIVE BOX
Chief complaint  Patient is a 71y old  Male who presents with a chief complaint of leg swelling (26 Oct 2021 10:43)   Review of systems  Patient in bed, c/o back pain, no hypoglycemic episodes.    Labs and Fingersticks  CAPILLARY BLOOD GLUCOSE      POCT Blood Glucose.: 135 mg/dL (26 Oct 2021 11:36)  POCT Blood Glucose.: 154 mg/dL (26 Oct 2021 07:31)  POCT Blood Glucose.: 138 mg/dL (25 Oct 2021 21:03)  POCT Blood Glucose.: 159 mg/dL (25 Oct 2021 16:22)      Anion Gap, Serum: 20 *H* (10-26 @ 07:45)  Anion Gap, Serum: 20 *H* (10-25 @ 07:37)      Calcium, Total Serum: 9.3 (10-26 @ 07:45)  Calcium, Total Serum: 9.2 (10-25 @ 07:37)  Albumin, Serum: 3.0 *L* (10-25 @ 07:37)    Alanine Aminotransferase (ALT/SGPT): <5 *L* (10-25 @ 07:37)  Alkaline Phosphatase, Serum: 73 (10-25 @ 07:37)  Aspartate Aminotransferase (AST/SGOT): 9 *L* (10-25 @ 07:37)        10-26    134<L>  |  99  |  140<H>  ----------------------------<  124<H>  4.9   |  15<L>  |  7.43<H>    Ca    9.3      26 Oct 2021 07:45  Phos  7.2     10-25  Mg     2.2     10-25    TPro  6.9  /  Alb  3.0<L>  /  TBili  0.2  /  DBili  x   /  AST  9<L>  /  ALT  <5<L>  /  AlkPhos  73  10-25                        6.8    12.99 )-----------( 221      ( 26 Oct 2021 07:17 )             20.8     Medications  MEDICATIONS  (STANDING):  albuterol/ipratropium for Nebulization 3 milliLiter(s) Nebulizer every 6 hours  ascorbic acid 500 milliGRAM(s) Oral daily  aspirin enteric coated 81 milliGRAM(s) Oral daily  atorvastatin 80 milliGRAM(s) Oral at bedtime  calcium acetate 1334 milliGRAM(s) Oral three times a day with meals  carbidopa/levodopa  25/100 2.5 Tablet(s) Oral <User Schedule>  carbidopa/levodopa  25/100 2 Tablet(s) Oral <User Schedule>  chlorhexidine 2% Cloths 1 Application(s) Topical daily  cholecalciferol 1000 Unit(s) Oral daily  dextrose 40% Gel 15 Gram(s) Oral once  dextrose 5%. 1000 milliLiter(s) (50 mL/Hr) IV Continuous <Continuous>  dextrose 5%. 1000 milliLiter(s) (100 mL/Hr) IV Continuous <Continuous>  dextrose 50% Injectable 25 Gram(s) IV Push once  dextrose 50% Injectable 12.5 Gram(s) IV Push once  dextrose 50% Injectable 25 Gram(s) IV Push once  epoetin mari-epbx (RETACRIT) Injectable 11446 Unit(s) IV Push once  folic acid 1 milliGRAM(s) Oral daily  furosemide   Injectable 60 milliGRAM(s) IV Push two times a day  glucagon  Injectable 1 milliGRAM(s) IntraMuscular once  heparin  Infusion.  Unit(s)/Hr (19 mL/Hr) IV Continuous <Continuous>  insulin glargine Injectable (LANTUS) 6 Unit(s) SubCutaneous at bedtime  insulin lispro (ADMELOG) corrective regimen sliding scale   SubCutaneous three times a day before meals  insulin lispro (ADMELOG) corrective regimen sliding scale   SubCutaneous at bedtime  levothyroxine 25 MICROGram(s) Oral daily  metoprolol tartrate 25 milliGRAM(s) Oral two times a day  mupirocin 2% Ointment 1 Application(s) Both Nostrils two times a day  Nephro-celeste 1 Tablet(s) Oral daily  trihexyphenidyl 2 milliGRAM(s) Oral three times a day      Physical Exam  General: Patient comfortable in bed  Vital Signs Last 12 Hrs  T(F): 98.2 (10-26-21 @ 14:15), Max: 98.2 (10-26-21 @ 14:15)  HR: 76 (10-26-21 @ 14:15) (62 - 101)  BP: 156/76 (10-26-21 @ 14:15) (74/40 - 156/76)  BP(mean): --  RR: 18 (10-26-21 @ 14:15) (18 - 18)  SpO2: 98% (10-26-21 @ 14:15) (95% - 98%)    Diagnostics    Free Thyroxine, Serum: AM Sched. Collection: 26-Oct-2021 06:00 (10-25 @ 11:45)  Thyroid Stimulating Hormone, Serum: AM Sched. Collection: 26-Oct-2021 06:00 (10-25 @ 11:45)  Cortisol AM, Serum: 08:00 (10-22 @ 12:47)  Free Thyroxine, Serum: AM Sched. Collection: 23-Oct-2021 06:00 (10-22 @ 12:43)  Thyroid Stimulating Hormone, Serum: AM Sched. Collection: 23-Oct-2021 06:00 (10-22 @ 12:43)           Chief complaint  Patient is a 71y old  Male who presents with a chief complaint of leg swelling (26 Oct 2021 10:43)   Review of systems  Patient in bed, c/o back pain, no hypoglycemic episodes.    Labs and Fingersticks  CAPILLARY BLOOD GLUCOSE      POCT Blood Glucose.: 135 mg/dL (26 Oct 2021 11:36)  POCT Blood Glucose.: 154 mg/dL (26 Oct 2021 07:31)  POCT Blood Glucose.: 138 mg/dL (25 Oct 2021 21:03)  POCT Blood Glucose.: 159 mg/dL (25 Oct 2021 16:22)      Anion Gap, Serum: 20 *H* (10-26 @ 07:45)  Anion Gap, Serum: 20 *H* (10-25 @ 07:37)      Calcium, Total Serum: 9.3 (10-26 @ 07:45)  Calcium, Total Serum: 9.2 (10-25 @ 07:37)  Albumin, Serum: 3.0 *L* (10-25 @ 07:37)    Alanine Aminotransferase (ALT/SGPT): <5 *L* (10-25 @ 07:37)  Alkaline Phosphatase, Serum: 73 (10-25 @ 07:37)  Aspartate Aminotransferase (AST/SGOT): 9 *L* (10-25 @ 07:37)        10-26    134<L>  |  99  |  140<H>  ----------------------------<  124<H>  4.9   |  15<L>  |  7.43<H>    Ca    9.3      26 Oct 2021 07:45  Phos  7.2     10-25  Mg     2.2     10-25    TPro  6.9  /  Alb  3.0<L>  /  TBili  0.2  /  DBili  x   /  AST  9<L>  /  ALT  <5<L>  /  AlkPhos  73  10-25                        6.8    12.99 )-----------( 221      ( 26 Oct 2021 07:17 )             20.8     Medications  MEDICATIONS  (STANDING):  albuterol/ipratropium for Nebulization 3 milliLiter(s) Nebulizer every 6 hours  ascorbic acid 500 milliGRAM(s) Oral daily  aspirin enteric coated 81 milliGRAM(s) Oral daily  atorvastatin 80 milliGRAM(s) Oral at bedtime  calcium acetate 1334 milliGRAM(s) Oral three times a day with meals  carbidopa/levodopa  25/100 2.5 Tablet(s) Oral <User Schedule>  carbidopa/levodopa  25/100 2 Tablet(s) Oral <User Schedule>  chlorhexidine 2% Cloths 1 Application(s) Topical daily  cholecalciferol 1000 Unit(s) Oral daily  dextrose 40% Gel 15 Gram(s) Oral once  dextrose 5%. 1000 milliLiter(s) (50 mL/Hr) IV Continuous <Continuous>  dextrose 5%. 1000 milliLiter(s) (100 mL/Hr) IV Continuous <Continuous>  dextrose 50% Injectable 25 Gram(s) IV Push once  dextrose 50% Injectable 12.5 Gram(s) IV Push once  dextrose 50% Injectable 25 Gram(s) IV Push once  epoetin mari-epbx (RETACRIT) Injectable 86848 Unit(s) IV Push once  folic acid 1 milliGRAM(s) Oral daily  furosemide   Injectable 60 milliGRAM(s) IV Push two times a day  glucagon  Injectable 1 milliGRAM(s) IntraMuscular once  heparin  Infusion.  Unit(s)/Hr (19 mL/Hr) IV Continuous <Continuous>  insulin glargine Injectable (LANTUS) 6 Unit(s) SubCutaneous at bedtime  insulin lispro (ADMELOG) corrective regimen sliding scale   SubCutaneous three times a day before meals  insulin lispro (ADMELOG) corrective regimen sliding scale   SubCutaneous at bedtime  levothyroxine 25 MICROGram(s) Oral daily  metoprolol tartrate 25 milliGRAM(s) Oral two times a day  mupirocin 2% Ointment 1 Application(s) Both Nostrils two times a day  Nephro-celeste 1 Tablet(s) Oral daily  trihexyphenidyl 2 milliGRAM(s) Oral three times a day      Physical Exam  General: Patient comfortable in bed  Vital Signs Last 12 Hrs  T(F): 98.2 (10-26-21 @ 14:15), Max: 98.2 (10-26-21 @ 14:15)  HR: 76 (10-26-21 @ 14:15) (62 - 101)  BP: 156/76 (10-26-21 @ 14:15) (74/40 - 156/76)  BP(mean): --  RR: 18 (10-26-21 @ 14:15) (18 - 18)  SpO2: 98% (10-26-21 @ 14:15) (95% - 98%)    Diagnostics    Free Thyroxine, Serum: AM Sched. Collection: 26-Oct-2021 06:00 (10-25 @ 11:45)  Thyroid Stimulating Hormone, Serum: AM Sched. Collection: 26-Oct-2021 06:00 (10-25 @ 11:45)  Cortisol AM, Serum: 08:00 (10-22 @ 12:47)  Free Thyroxine, Serum: AM Sched. Collection: 23-Oct-2021 06:00 (10-22 @ 12:43)  Thyroid Stimulating Hormone, Serum: AM Sched. Collection: 23-Oct-2021 06:00 (10-22 @ 12:43)

## 2021-10-26 NOTE — PROGRESS NOTE ADULT - ATTENDING COMMENTS
Dr Rogers will be covering  starting 10/27/21  [facility] @ [phone] for questions or concerns

## 2021-10-26 NOTE — PROGRESS NOTE ADULT - PROBLEM SELECTOR PLAN 7
likely anemia of ckd, chr dz  check iron studies  no e/o active bleed, monitor while on AC  transfuse for hb<7 or symptoms    acute on chronic anemia, rt groin pain cannot r/o active intramuscular/RP bleed  cta/p fu, monitor hb arnold likely anemia of ckd, chr dz  check iron studies  no e/o active bleed, monitor while on AC  transfuse for hb<7 or symptoms    acute on chronic anemia, rt groin pain cannot r/o active intramuscular/RP bleed  cta/p fu, monitor hb closely

## 2021-10-26 NOTE — PROGRESS NOTE ADULT - SUBJECTIVE AND OBJECTIVE BOX
[facility] Cardiology Progress Note  _______________________________    Pt. seen and examined. sleeping.    Telemetry -atrial flutter 60-70s, overnight 100-120s    T(C): 36.7 (10-26-21 @ 05:30), Max: 36.8 (10-25-21 @ 19:20)  HR: 101 (10-26-21 @ 05:30) (75 - 104)  BP: 119/69 (10-26-21 @ 08:12) (74/40 - 139/69)  RR: 18 (10-26-21 @ 05:30) (18 - 18)  SpO2: 96% (10-26-21 @ 05:30) (95% - 97%)  I&O's Summary    25 Oct 2021 07:01  -  26 Oct 2021 07:00  --------------------------------------------------------  IN: 1630 mL / OUT: 1900 mL / NET: -270 mL        PHYSICAL EXAM:  GENERAL: Alert, NAD.  NECK: Supple  CHEST/LUNG: Clear to auscultation bilaterally; No wheezes, rales, or rhonchi.  HEART: S1 S2 normal, irregular.   ABDOMEN: Soft, Nondistended  EXTREMITIES:  +1 LE edema.      LABS:                        6.8    12.99 )-----------( 221      ( 26 Oct 2021 07:17 )             20.8     10-26    134<L>  |  99  |  140<H>  ----------------------------<  124<H>  4.9   |  15<L>  |  7.43<H>    Ca    9.3      26 Oct 2021 07:45  Phos  7.2     10-25  Mg     2.2     10-25    TPro  6.9  /  Alb  3.0<L>  /  TBili  0.2  /  DBili  x   /  AST  9<L>  /  ALT  <5<L>  /  AlkPhos  73  10-25    PTT - ( 25 Oct 2021 07:24 )  PTT:80.8 sec  CARDIAC MARKERS ( 21 Oct 2021 07:14 )  x     / x     / 123 U/L / x     / 5.4 ng/mL              MEDICATIONS  (STANDING):  albuterol/ipratropium for Nebulization 3 milliLiter(s) Nebulizer every 6 hours  ascorbic acid 500 milliGRAM(s) Oral daily  aspirin enteric coated 81 milliGRAM(s) Oral daily  atorvastatin 80 milliGRAM(s) Oral at bedtime  calcium acetate 1334 milliGRAM(s) Oral three times a day with meals  carbidopa/levodopa  25/100 2 Tablet(s) Oral three times a day  cholecalciferol 1000 Unit(s) Oral daily  dextrose 40% Gel 15 Gram(s) Oral once  dextrose 5%. 1000 milliLiter(s) (50 mL/Hr) IV Continuous <Continuous>  dextrose 5%. 1000 milliLiter(s) (100 mL/Hr) IV Continuous <Continuous>  dextrose 50% Injectable 25 Gram(s) IV Push once  dextrose 50% Injectable 12.5 Gram(s) IV Push once  dextrose 50% Injectable 25 Gram(s) IV Push once  enoxaparin Injectable 100 milliGRAM(s) SubCutaneous daily  folic acid 1 milliGRAM(s) Oral daily  furosemide   Injectable 60 milliGRAM(s) IV Push two times a day  furosemide   Injectable 40 milliGRAM(s) IV Push once  glucagon  Injectable 1 milliGRAM(s) IntraMuscular once  insulin glargine Injectable (LANTUS) 6 Unit(s) SubCutaneous at bedtime  insulin lispro (ADMELOG) corrective regimen sliding scale   SubCutaneous three times a day before meals  insulin lispro (ADMELOG) corrective regimen sliding scale   SubCutaneous at bedtime  metoprolol tartrate 25 milliGRAM(s) Oral two times a day  Nephro-celeste 1 Tablet(s) Oral daily  trihexyphenidyl 2 milliGRAM(s) Oral three times a day    MEDICATIONS  (PRN):  acetaminophen     Tablet .. 650 milliGRAM(s) Oral every 6 hours PRN Mild Pain (1 - 3)  guaiFENesin Oral Liquid (Sugar-Free) 200 milliGRAM(s) Oral every 6 hours PRN Cough        RADIOLOGY & ADDITIONAL TESTS:

## 2021-10-27 NOTE — PROGRESS NOTE ADULT - ASSESSMENT
72yo M w/ PMHx of CAD (s/p CABG 2019), CKD (unknown stage), DM2, Parkinson's Disease, HTN, depression presents with bilateral leg swelling  Mild CHF  Acute on chronic renal failure --getting worse   Hyperphosphatemia  Abd pain   Severe LV dysfucntion     1 IR- Shiley placed and perm cath in 1-2 days      2 Renal-   DC IV lasix   Second HD today     Phoslo 1337 tid    3 CVS-Cards f/u noted;  On Lopressor  bid    4 GI-Blood today;  Retacrit at HD  CT of the abd with contrast for evaluation of abd pain ---NO renal objection --HD to follow      DW ACP     Sayed Next Caller [facility]   [phone]

## 2021-10-27 NOTE — PROGRESS NOTE ADULT - SUBJECTIVE AND OBJECTIVE BOX
NEPHROLOGY-[facility] [phone]        Patient seen and examined in bed.  SOme vague lower abd pain (no back pain )   Getting blood at present         MEDICATIONS  (STANDING):  albuterol/ipratropium for Nebulization 3 milliLiter(s) Nebulizer every 6 hours  ascorbic acid 500 milliGRAM(s) Oral daily  aspirin enteric coated 81 milliGRAM(s) Oral daily  atorvastatin 80 milliGRAM(s) Oral at bedtime  calcium acetate 1334 milliGRAM(s) Oral three times a day with meals  carbidopa/levodopa  25/100 2.5 Tablet(s) Oral <User Schedule>  carbidopa/levodopa  25/100 2 Tablet(s) Oral <User Schedule>  chlorhexidine 2% Cloths 1 Application(s) Topical daily  chlorhexidine 4% Liquid 1 Application(s) Topical <User Schedule>  cholecalciferol 1000 Unit(s) Oral daily  dextrose 40% Gel 15 Gram(s) Oral once  dextrose 5%. 1000 milliLiter(s) (50 mL/Hr) IV Continuous <Continuous>  dextrose 5%. 1000 milliLiter(s) (100 mL/Hr) IV Continuous <Continuous>  dextrose 50% Injectable 25 Gram(s) IV Push once  dextrose 50% Injectable 12.5 Gram(s) IV Push once  dextrose 50% Injectable 25 Gram(s) IV Push once  folic acid 1 milliGRAM(s) Oral daily  furosemide   Injectable 40 milliGRAM(s) IV Push once  glucagon  Injectable 1 milliGRAM(s) IntraMuscular once  insulin glargine Injectable (LANTUS) 6 Unit(s) SubCutaneous at bedtime  insulin lispro (ADMELOG) corrective regimen sliding scale   SubCutaneous three times a day before meals  insulin lispro (ADMELOG) corrective regimen sliding scale   SubCutaneous at bedtime  levothyroxine 25 MICROGram(s) Oral daily  metoprolol tartrate 25 milliGRAM(s) Oral two times a day  mupirocin 2% Ointment 1 Application(s) Both Nostrils two times a day  Nephro-celeste 1 Tablet(s) Oral daily  trihexyphenidyl 2 milliGRAM(s) Oral three times a day      VITAL:  T(C): , Max: 36.9 (10-26-21 @ 15:18)  T(F): , Max: 98.4 (10-26-21 @ 15:18)  HR: 60 (10-27-21 @ 07:46)  BP: 146/72 (10-27-21 @ 07:46)  BP(mean): --  RR: 18 (10-27-21 @ 07:46)  SpO2: 98% (10-27-21 @ 07:46)  Wt(kg): --    I and O's:    10-26 @ 07:01  -  10-27 @ 07:00  --------------------------------------------------------  IN: 360 mL / OUT: 1600 mL / NET: -1240 mL          PHYSICAL EXAM:    Constitutional: NAD  Neck:  No JVD  Respiratory: CTAB/L  Cardiovascular: S1 and S2  Gastrointestinal: BS+, soft, NT/ND  Extremities: No peripheral edema  Neurological: A/O x 3, no focal deficits  Psychiatric: Normal mood, normal affect  : No [place] + [place]   Skin: No rashes  Access: [facility]    LABS:                        6.5    14.28 )-----------( 224      ( 27 Oct 2021 05:02 )             19.1     10-27    134<L>  |  99  |  99<H>  ----------------------------<  122<H>  4.1   |  19<L>  |  5.23<H>    Ca    9.1      27 Oct 2021 05:02  Phos  4.6     10-27  Mg     2.0     10-27    TPro  6.2  /  Alb  2.6<L>  /  TBili  0.6  /  DBili  x   /  AST  10  /  ALT  <5<L>  /  AlkPhos  66  10-27          Urine Studies:          RADIOLOGY & ADDITIONAL STUDIES:

## 2021-10-27 NOTE — CONSULT NOTE ADULT - SUBJECTIVE AND OBJECTIVE BOX
Chief Complaint:  Patient is a 71y old  Male who presents with a chief complaint of leg swelling (27 Oct 2021 18:04)      Date of service: 10-27-21 @ 22:08    HPI:    The patient is a 71 year old man with CAD (s/p CABG ), CKD (unknown stage), DM2, Parkinson's Disease, HTN, depression p/w b/l LE edema for the past few months. Over the past 2wks it has significantly worsened, he has further difficulty ambulating due to swelling. Pt was admitted for CHF w/u.    Pt was started on Hep gtt for NSTEMI and new Aflutter and started on HD for acute on chronic CKD. ON he developed of back pain and anemia, CT non contrast showed b/l RP hematoma.    The patient is somulent but per his son he has not had any gross GI bleeding, and has no hx of GI bleeding/ulcers        The patient denies dysphagia, nausea and vomiting, abdominal pain, diarrhea, unintentional weight loss, change in bowel habits or NSAID use.      Allergies:  adhesives (Rash)  latex (Urticaria)  No Known Drug Allergies      Home Medications:    Hospital Medications:  acetaminophen     Tablet .. 650 milliGRAM(s) Oral every 6 hours PRN  albuterol/ipratropium for Nebulization 3 milliLiter(s) Nebulizer every 6 hours  ascorbic acid 500 milliGRAM(s) Oral daily  atorvastatin 80 milliGRAM(s) Oral at bedtime  calcium acetate 1334 milliGRAM(s) Oral three times a day with meals  carbidopa/levodopa  25/100 2.5 Tablet(s) Oral <User Schedule>  carbidopa/levodopa  25/100 2 Tablet(s) Oral <User Schedule>  chlorhexidine 2% Cloths 1 Application(s) Topical daily  chlorhexidine 4% Liquid 1 Application(s) Topical <User Schedule>  cholecalciferol 1000 Unit(s) Oral daily  desmopressin Injectable 30 MICROGram(s) IV Push once  dextrose 40% Gel 15 Gram(s) Oral once  dextrose 5%. 1000 milliLiter(s) IV Continuous <Continuous>  dextrose 5%. 1000 milliLiter(s) IV Continuous <Continuous>  dextrose 50% Injectable 25 Gram(s) IV Push once  dextrose 50% Injectable 12.5 Gram(s) IV Push once  dextrose 50% Injectable 25 Gram(s) IV Push once  epoetin mari-epbx (RETACRIT) Injectable 10863 Unit(s) IV Push once  folic acid 1 milliGRAM(s) Oral daily  glucagon  Injectable 1 milliGRAM(s) IntraMuscular once  guaiFENesin Oral Liquid (Sugar-Free) 200 milliGRAM(s) Oral every 6 hours PRN  insulin glargine Injectable (LANTUS) 6 Unit(s) SubCutaneous at bedtime  insulin lispro (ADMELOG) corrective regimen sliding scale   SubCutaneous three times a day before meals  insulin lispro (ADMELOG) corrective regimen sliding scale   SubCutaneous at bedtime  levothyroxine 25 MICROGram(s) Oral daily  metoprolol tartrate 25 milliGRAM(s) Oral two times a day  mupirocin 2% Ointment 1 Application(s) Both Nostrils two times a day  Nephro-celeste 1 Tablet(s) Oral daily  oxyCODONE    IR 5 milliGRAM(s) Oral every 8 hours PRN  sodium chloride 0.9% lock flush 10 milliLiter(s) IV Push every 1 hour PRN  trihexyphenidyl 2 milliGRAM(s) Oral three times a day      PMHX/PSHX:  Hypertension    Diabetes Mellitus, Type 2    Hypercholesterolemia    Parkinson disease    CAD (coronary artery disease)    Leg swelling    Autoimmune disease    Insomnia    Vertigo    Gastroesophageal reflux disease without esophagitis    Nocturia    Urinary incontinence    Urinary frequency    Panic attacks    Osteoarthritis    Shoulder pain, left    Status Post Cardiac Catheterization    S/P angioplasty with stent    S/P CABG (coronary artery bypass graft)    S/P hip replacement, right    Anxiety    Depression, major        Family history:  No significant family history    Family history of hypertension    Family history of malaria    Family history of pulmonary fibrosis (Sibling)        Social History:   Denies ethanol use.  Denies illicit drug use.    ROS:     does not provide      PHYSICAL EXAM:     GENERAL:  Appears stated age, well-groomed, well-nourished, no distress  HEENT:  NC/AT,  conjunctivae anicteric, clear and pink,   NECK: supple, trachea midline  CHEST:  Full & symmetric excursion, no increased effort, breath sounds clear  HEART:  Regular rhythm, no JVD  ABDOMEN:  Soft, non-tender, non-distended, normoactive bowel sounds,  no masses , no hepatosplenomegaly  EXTREMITIES:  no cyanosis,clubbing or edema  SKIN:  No rash, erythema, or, ecchymoses, no jaundice  NEURO:  , non-focal, no asterixis    RECTAL: Deferred      Vital Signs:  Vital Signs Last 24 Hrs  T(C): 36.3 (27 Oct 2021 20:15), Max: 36.7 (27 Oct 2021 07:53)  T(F): 97.4 (27 Oct 2021 20:15), Max: 98.1 (27 Oct 2021 09:57)  HR: 62 (27 Oct 2021 20:15) (60 - 87)  BP: 113/74 (27 Oct 2021 20:15) (113/74 - 167/69)  BP(mean): --  RR: 18 (27 Oct 2021 20:15) (17 - 18)  SpO2: 100% (27 Oct 2021 20:15) (97% - 100%)  Daily     Daily Weight in k.4 (27 Oct 2021 08:43)    LABS: Labs personally reviewed by me:                        6.7     )-----------( 241      ( 27 Oct 2021 16:58 )             20.4     10    135  |  98  |  105<H>  ----------------------------<  197<H>  4.6   |  18<L>  |  5.52<H>    Ca    9.1      27 Oct 2021 16:58  Phos  4.6     10-27  Mg     2.0     10-27    TPro  6.2  /  Alb  2.5<L>  /  TBili  0.6  /  DBili  x   /  AST  12  /  ALT  6<L>  /  AlkPhos  64  1027    LIVER FUNCTIONS - ( 27 Oct 2021 16:58 )  Alb: 2.5 g/dL / Pro: 6.2 g/dL / ALK PHOS: 64 U/L / ALT: 6 U/L / AST: 12 U/L / GGT: x           PT/INR - ( 26 Oct 2021 11:06 )   PT: 13.2 sec;   INR: 1.11 ratio         PTT - ( 26 Oct 2021 11:06 )  PTT:79.0 sec    Amylase Serum--      Lipase serum--       [id number]      Imaging personally reviewed by me:

## 2021-10-27 NOTE — RAPID RESPONSE TEAM SUMMARY - NSOTHERINTERVENTIONSRRT_GEN_ALL_CORE
Primary team advised to:  - transfuse 1/2 unit pRBC now given Hgb 6.7 (hx of CHF with EF 35% on 10/21/21); trend CBC q4h  - expedite and f/u read for CT A/P w/ IV con given concern for RP bleed; if active bleed, consult IR/MICU  - if CT A/P negative for bleed and pt stable, pt should go for planned HD for possible uremic encephalopathy (BUN>100)  - f/u read for CTH non con  - f/u EKG given Aflutter on monitor; though appears to be known during course of hospitalization, possibly in setting of bleed; f/u with Cardiology  - continue to monitor BP closely (at least q4h for now)  - low threshold for MICU consult, particularly if active RP bleed or hypotensive Primary team advised to:  - transfuse 1/2 unit pRBC now given Hgb 6.7 (pt with new onset CHF with EF 35% on 10/21/21); trend CBC q4h  - expedite and f/u read for CT A/P w/ IV con given concern for RP bleed; if active bleed, consult IR/MICU  - if CT A/P negative for bleed and pt stable, pt should go for planned HD for possible uremic encephalopathy (BUN>100)  - f/u read for CTH non con  - f/u EKG given Aflutter on monitor; though appears to be known during course of hospitalization, possibly in setting of bleed; f/u with Cardiology  - continue to monitor BP closely (at least q4h for now)  - low threshold for MICU consult, particularly if active RP bleed or hypotensive

## 2021-10-27 NOTE — CHART NOTE - NSCHARTNOTEFT_GEN_A_CORE
Nutrition Follow Up Note  Patient seen for: nutrition consult for heart failure education.    Chart reviewed, events noted. Pt is a 72yo M w/ PMHx of CAD (s/p CABG ), CKD (unknown stage), DM2, Parkinson's Disease, HTN, depression presents with new onset acute heart failure exacerbation. Of note, pt started on hemodialysis yesterday.    Source: [x] Patient       [x] EMR        [] RN        [] Family at bedside       [] Other:    -If unable to interview patient: [] Trach/Vent/BiPAP  [] Disoriented/confused/inappropriate to interview    Diet Order:   Diet, Consistent Carbohydrate w/Evening Snack:   DASH/TLC {Sodium & Cholesterol Restricted} (DASH)  1500mL Fluid Restriction ([id number])  No Concentrated Phosphorus  Supplement Feeding Modality:  Oral  Suplena Cans or Servings Per Day:  1       Frequency:  Daily (10-22-21)    - PO intake :   [] >75%  Adequate    [] 50-75%  Fair       [x] <50%  Poor    - Nutrition-related concerns:    - Per pt and chart documentation, pt with decreased PO intake for past 2 days 2/2 decrease in appetite and abdominal pain, nausea. Consuming <50% of meals. Pt able to take some of Suplena supplement this AM.     GI:  Last BM unclear at this time per pt/chart.   Bowel Regimen? [] Yes   [x] No    Weights:   Daily Weight in k.4 (10-27), Weight in k (10-26), Weight in k.5 (10-26), Weight in k.3 (10-26), Weight in k.6 (10-25), Weight in k.4 (10-24), Weight in k.7 (10-23)  weight changes noted, will continue to monitor     Nutritionally Pertinent MEDICATIONS  (STANDING):  ascorbic acid  atorvastatin  calcium acetate  cholecalciferol  dextrose 40% Gel  dextrose 5%.  dextrose 5%.  dextrose 50% Injectable  dextrose 50% Injectable  dextrose 50% Injectable  folic acid  glucagon  Injectable  insulin glargine Injectable (LANTUS)  insulin lispro (ADMELOG) corrective regimen sliding scale  insulin lispro (ADMELOG) corrective regimen sliding scale  levothyroxine  metoprolol tartrate  Nephro-celeste    Pertinent Labs: 10-27 @ 05:02: Na 134<L>, BUN 99<H>, Cr 5.23<H>, <H>, K+ 4.1, Phos 4.6<H>, Mg 2.0, Alk Phos 66, ALT/SGPT <5<L>, AST/SGOT 10, HbA1c --    A1C with Estimated Average Glucose Result: 6.4 % (10-22-21 @ 08:56)    Finger Sticks:  POCT Blood Glucose.: 138 mg/dL (10-27 @ 07:34)  POCT Blood Glucose.: 162 mg/dL (10-26 @ 22:04)  POCT Blood Glucose.: 158 mg/dL (10-26 @ 17:24)  POCT Blood Glucose.: 135 mg/dL (10-26 @ 11:36)    Skin per nursing documentation: suspected DTI to sacrum  Edema: 2+ bilateral leg and ankle    Estimated Needs: based on upper end IBW 86kg.  [x] recalculated: with consideration for HD  Estimated Energy Needs: 2580-3010kcal (30-35kcal/kg)  Estimated Protein Needs: 120-138g protein (1.4-1.6g/kg)  Defer fluid needs to team.    Previous Nutrition Diagnosis: Increased Nutrient Needs, Food and Nutrition Related Knowledge Deficit  Nutrition Diagnosis is: [x] ongoing  [] resolved [] not applicable     New Nutrition Diagnosis: [x] Inadequate Oral Intake related to decreased appetite and abdominal pain as evidenced by pt consuming <50% of meal trays x 2 days    Nutrition Care Plan:  [x] In Progress  [] Achieved  [] Not applicable    Nutrition Interventions:     Education Provided:       [] Yes:  [x] No: Nutrition education deferred at this time as pt in reported significant pain.     Recommendations:      1. Recommend diet change to consistent carbohydrate w/ evening snack, low sodium, no concentrated phosphorus with 1500ml fluid restriction (as per team).  2. Suggest Nepro 2x daily (850 kcals, 38 g protein) to promote adequate protein/kcal intake.  3. Continue Nephro-celeste and vitamin C if medically feasible.  4. Provide/reinforce nutrition education as needed - heart failure education inappropriate at this time, RD remains available and will assess as feasible for follow up education when more appropriate.  5. Consider bowel regimen if feasible as pt with no documented BM since admission    Monitoring and Evaluation:   Continue to monitor nutritional intake, tolerance to diet prescription, weights, labs, skin integrity    RD remains available upon request and will follow up per protocol    Chanel Nava MS, RD, CDN Pager# [phone]

## 2021-10-27 NOTE — PROGRESS NOTE ADULT - SUBJECTIVE AND OBJECTIVE BOX
Chief complaint  Patient is a 71y old  Male who presents with a chief complaint of leg swelling (27 Oct 2021 10:25)   Review of systems  Patient awake in bed, no hypoglycemic episodes.    Labs and Fingersticks  CAPILLARY BLOOD GLUCOSE      POCT Blood Glucose.: 222 mg/dL (27 Oct 2021 11:38)  POCT Blood Glucose.: 138 mg/dL (27 Oct 2021 07:34)  POCT Blood Glucose.: 162 mg/dL (26 Oct 2021 22:04)  POCT Blood Glucose.: 158 mg/dL (26 Oct 2021 17:24)      Medications  MEDICATIONS  (STANDING):  albuterol/ipratropium for Nebulization 3 milliLiter(s) Nebulizer every 6 hours  ascorbic acid 500 milliGRAM(s) Oral daily  aspirin enteric coated 81 milliGRAM(s) Oral daily  atorvastatin 80 milliGRAM(s) Oral at bedtime  calcium acetate 1334 milliGRAM(s) Oral three times a day with meals  carbidopa/levodopa  25/100 2.5 Tablet(s) Oral <User Schedule>  carbidopa/levodopa  25/100 2 Tablet(s) Oral <User Schedule>  chlorhexidine 2% Cloths 1 Application(s) Topical daily  chlorhexidine 4% Liquid 1 Application(s) Topical <User Schedule>  cholecalciferol 1000 Unit(s) Oral daily  dextrose 40% Gel 15 Gram(s) Oral once  dextrose 5%. 1000 milliLiter(s) (50 mL/Hr) IV Continuous <Continuous>  dextrose 5%. 1000 milliLiter(s) (100 mL/Hr) IV Continuous <Continuous>  dextrose 50% Injectable 25 Gram(s) IV Push once  dextrose 50% Injectable 12.5 Gram(s) IV Push once  dextrose 50% Injectable 25 Gram(s) IV Push once  epoetin mari-epbx (RETACRIT) Injectable 56857 Unit(s) IV Push once  folic acid 1 milliGRAM(s) Oral daily  glucagon  Injectable 1 milliGRAM(s) IntraMuscular once  insulin glargine Injectable (LANTUS) 6 Unit(s) SubCutaneous at bedtime  insulin lispro (ADMELOG) corrective regimen sliding scale   SubCutaneous three times a day before meals  insulin lispro (ADMELOG) corrective regimen sliding scale   SubCutaneous at bedtime  levothyroxine 25 MICROGram(s) Oral daily  metoprolol tartrate 25 milliGRAM(s) Oral two times a day  mupirocin 2% Ointment 1 Application(s) Both Nostrils two times a day  Nephro-celeste 1 Tablet(s) Oral daily  trihexyphenidyl 2 milliGRAM(s) Oral three times a day      Physical Exam  General: Patient comfortable in bed  Vital Signs Last 12 Hrs  T(F): 97.6 (10-27-21 @ 11:25), Max: 98.1 (10-27-21 @ 09:57)  HR: 62 (10-27-21 @ 11:25) (60 - 80)  BP: 167/69 (10-27-21 @ 11:25) (133/70 - 167/69)  BP(mean): --  RR: 18 (10-27-21 @ 11:25) (17 - 18)  SpO2: 99% (10-27-21 @ 11:25) (97% - 99%)    Diagnostics    Free Thyroxine, Serum: AM Sched. Collection: 26-Oct-2021 06:00 (10-25 @ 11:45)  Thyroid Stimulating Hormone, Serum: AM Sched. Collection: 26-Oct-2021 06:00 (10-25 @ 11:45)  Cortisol AM, Serum: 08:00 (10-22 @ 12:47)  Free Thyroxine, Serum: AM Sched. Collection: 23-Oct-2021 06:00 (10-22 @ 12:43)  Thyroid Stimulating Hormone, Serum: AM Sched. Collection: 23-Oct-2021 06:00 (10-22 @ 12:43)           Chief complaint  Patient is a 71y old  Male who presents with a chief complaint of leg swelling (27 Oct 2021 10:25)   Review of systems  Patient awake in bed, no hypoglycemic episodes.    Labs and Fingersticks  CAPILLARY BLOOD GLUCOSE      POCT Blood Glucose.: 222 mg/dL (27 Oct 2021 11:38)  POCT Blood Glucose.: 138 mg/dL (27 Oct 2021 07:34)  POCT Blood Glucose.: 162 mg/dL (26 Oct 2021 22:04)  POCT Blood Glucose.: 158 mg/dL (26 Oct 2021 17:24)      Medications  MEDICATIONS  (STANDING):  albuterol/ipratropium for Nebulization 3 milliLiter(s) Nebulizer every 6 hours  ascorbic acid 500 milliGRAM(s) Oral daily  aspirin enteric coated 81 milliGRAM(s) Oral daily  atorvastatin 80 milliGRAM(s) Oral at bedtime  calcium acetate 1334 milliGRAM(s) Oral three times a day with meals  carbidopa/levodopa  25/100 2.5 Tablet(s) Oral <User Schedule>  carbidopa/levodopa  25/100 2 Tablet(s) Oral <User Schedule>  chlorhexidine 2% Cloths 1 Application(s) Topical daily  chlorhexidine 4% Liquid 1 Application(s) Topical <User Schedule>  cholecalciferol 1000 Unit(s) Oral daily  dextrose 40% Gel 15 Gram(s) Oral once  dextrose 5%. 1000 milliLiter(s) (50 mL/Hr) IV Continuous <Continuous>  dextrose 5%. 1000 milliLiter(s) (100 mL/Hr) IV Continuous <Continuous>  dextrose 50% Injectable 25 Gram(s) IV Push once  dextrose 50% Injectable 12.5 Gram(s) IV Push once  dextrose 50% Injectable 25 Gram(s) IV Push once  epoetin mari-epbx (RETACRIT) Injectable 82034 Unit(s) IV Push once  folic acid 1 milliGRAM(s) Oral daily  glucagon  Injectable 1 milliGRAM(s) IntraMuscular once  insulin glargine Injectable (LANTUS) 6 Unit(s) SubCutaneous at bedtime  insulin lispro (ADMELOG) corrective regimen sliding scale   SubCutaneous three times a day before meals  insulin lispro (ADMELOG) corrective regimen sliding scale   SubCutaneous at bedtime  levothyroxine 25 MICROGram(s) Oral daily  metoprolol tartrate 25 milliGRAM(s) Oral two times a day  mupirocin 2% Ointment 1 Application(s) Both Nostrils two times a day  Nephro-celeste 1 Tablet(s) Oral daily  trihexyphenidyl 2 milliGRAM(s) Oral three times a day      Physical Exam  General: Patient comfortable in bed  Vital Signs Last 12 Hrs  T(F): 97.6 (10-27-21 @ 11:25), Max: 98.1 (10-27-21 @ 09:57)  HR: 62 (10-27-21 @ 11:25) (60 - 80)  BP: 167/69 (10-27-21 @ 11:25) (133/70 - 167/69)  BP(mean): --  RR: 18 (10-27-21 @ 11:25) (17 - 18)  SpO2: 99% (10-27-21 @ 11:25) (97% - 99%)    Diagnostics    Free Thyroxine, Serum: AM Sched. Collection: 26-Oct-2021 06:00 (10-25 @ 11:45)  Thyroid Stimulating Hormone, Serum: AM Sched. Collection: 26-Oct-2021 06:00 (10-25 @ 11:45)  Cortisol AM, Serum: 08:00 (10-22 @ 12:47)  Free Thyroxine, Serum: AM Sched. Collection: 23-Oct-2021 06:00 (10-22 @ 12:43)  Thyroid Stimulating Hormone, Serum: AM Sched. Collection: 23-Oct-2021 06:00 (10-22 @ 12:43)

## 2021-10-27 NOTE — CONSULT NOTE ADULT - ASSESSMENT
72yo M w/ PMHx of CAD (s/p CABG 2019), CKD (unknown stage), DM2, Parkinson's Disease, HTN, depression presents with NSTEMI was placed on hep gtt and developed b/l RP hematoma.     PLAN:    - No acute intervention from surgery, patient stable  - Transfuse to Hgb>7  - CT with small RP hematoma, unlikely reasons for his anemia.  - Please obtain CBC and PTT/INR now and q4 hours  - Plan discussed with Attending, Dr. Joe Day MD, PGY 3  Trauma and Acute Care Surgery  [id number]

## 2021-10-27 NOTE — PROGRESS NOTE ADULT - SUBJECTIVE AND OBJECTIVE BOX
Vascular & Interventional Radiology    Interval history: VIR reconsulted for enlarging L RP bleed and anemia. On hep gtt for NSTEMI. Patient received 3 u pRBC earlier today.    Allergies: adhesives (Rash)  latex (Urticaria)    Medications (Abx/Cardiac/Anticoagulation/Blood Products)  aspirin enteric coated: 81 milliGRAM(s) Oral (10-27 @ 11:46)  furosemide   Injectable: 60 milliGRAM(s) IV Push (10-26 @ 05:37)  furosemide   Injectable: 40 milliGRAM(s) IV Push (10-26 @ 14:38)  furosemide   Injectable: 40 milliGRAM(s) IV Push (10-26 @ 03:51)  metoprolol tartrate: 25 milliGRAM(s) Oral (10-27 @ 05:28)    Data:    T(C): 36.4  HR: 62  BP: 121/74  RR: 18  SpO2: 99%    -WBC 16.52 / HgB 6.7 / Hct 20.4 / Plt 241  -Na 135 / Cl 98 /  / Glucose 197  -K 4.6 / CO2 18 / Cr 5.52  -ALT 6 / Alk Phos 64 / T.Bili 0.6  -INR1.11      Assessment:  71y Male w/ anemia in the setting of RP hematomas 2/2 hep gtt for NSTEMI.     Plan:   - MICU consult.  - Transfuse pRBC.  - Recheck coags now.  - Serial CBC.  - Will follow. If patient decompensates, page IR for re-evaluation. [phone]. Vascular & Interventional Radiology    Interval history: VIR reconsulted for enlarging L RP bleed and anemia. On hep gtt for NSTEMI. Patient received 3 u pRBC earlier today.    Allergies: adhesives (Rash)  latex (Urticaria)    Medications (Abx/Cardiac/Anticoagulation/Blood Products)  aspirin enteric coated: 81 milliGRAM(s) Oral (10-27 @ 11:46)  furosemide   Injectable: 60 milliGRAM(s) IV Push (10-26 @ 05:37)  furosemide   Injectable: 40 milliGRAM(s) IV Push (10-26 @ 14:38)  furosemide   Injectable: 40 milliGRAM(s) IV Push (10-26 @ 03:51)  metoprolol tartrate: 25 milliGRAM(s) Oral (10-27 @ 05:28)    Data:    T(C): 36.4  HR: 62  BP: 121/74  RR: 18  SpO2: 99%    -WBC 16.52 / HgB 6.7 / Hct 20.4 / Plt 241  -Na 135 / Cl 98 /  / Glucose 197  -K 4.6 / CO2 18 / Cr 5.52  -ALT 6 / Alk Phos 64 / T.Bili 0.6  -INR1.11      Assessment:  71y Male w/ anemia in the setting of RP hematomas 2/2 hep gtt for NSTEMI.     Plan:   - MICU consult.  - Transfuse pRBC.  - Recheck coags now.  - Make patient NPO.  - Serial CBC.  - Will follow. If patient decompensates, page IR for re-evaluation. [phone].

## 2021-10-27 NOTE — CHART NOTE - NSCHARTNOTEFT_GEN_A_CORE
Medicine NP Note     ORLIN HARDIN  MRN-[id number]  Allergies    adhesives (Rash)  latex (Urticaria)  No Known Drug Allergies    Intolerances               Pt with noted decreased h/h despite transfusion, worsened abdominal pain, Left flank pain today. Pt states pain began yesterday and has significantly increased since last night. Pt requesting to get out of bed as back is very painful. Mild nausea noted. Pt with no vomiting. No diarrhea. NO CP, NO HA.     Vital Signs Last 24 Hrs  T(C): 36.4 (10-27-21 @ 18:29), Max: 36.7 (10-27-21 @ 07:53)  T(F): 97.5 (10-27-21 @ 18:29), Max: 98.1 (10-27-21 @ 09:57)  HR: 87 (10-27-21 @ 18:29) (60 - 87)  BP: 115/67 (10-27-21 @ 18:29) (115/67 - 167/69)  BP(mean): --  RR: 18 (10-27-21 @ 18:29) (17 - 18)  SpO2: 100% (10-27-21 @ 18:29) (95% - 100%)  Daily     Daily Weight in k.4 (27 Oct 2021 08:43)  I&O's Summary    26 Oct 2021 07:01  -  27 Oct 2021 07:00  --------------------------------------------------------  IN: 360 mL / OUT: 1600 mL / NET: -1240 mL    6a labs 6.5/19.1                        6.7    16.52 )-----------( 241      ( 27 Oct 2021 16:58 )             20.4     10-27    135  |  98  |  105<H>  ----------------------------<  197<H>  4.6   |  18<L>  |  5.52<H>    Ca    9.1      27 Oct 2021 16:58  Phos  4.6     10-27  Mg     2.0     10-27    TPro  6.2  /  Alb  2.5<L>  /  TBili  0.6  /  DBili  x   /  AST  12  /  ALT  6<L>  /  AlkPhos  64  10-27    PT/INR - ( 26 Oct 2021 11:06 )   PT: 13.2 sec;   INR: 1.11 ratio       PTT - ( 26 Oct 2021 11:06 )  PTT:79.0 sec      ABG - ( 27 Oct 2021 16:55 )  pH, Arterial: 7.38  pH, Blood: x     /  pCO2: 39    /  pO2: 125   / HCO3: 23    / Base Excess: -1.9  /  SaO2: 100.0     Radiology: CT abdominal 10/26/21: Bilateral ill-defined retroperitoneal hemorrhage.Moderate left and small right pleural effusions.  Right-sided ground glass opacities of uncertain etiology.      PHYSICAL EXAM:  GENERAL: abdominal distress noted   CHEST/LUNG: marvin clear   HEART: regular , 70s   ABDOMEN: guarding. Pain to lower abdomen with palpation. Bowel sounds present   EXTREMITIES:  2+ Peripheral Pulses, No clubbing, cyanosis, or edema  PSYCH: AAOx3  NEUROLOGY: non-focal  SKIN: No rashes or lesions    Assessment/Plan: HPI:  70yo M w/ PMHx of CAD (s/p CABG ), CKD (unknown stage), DM2, Parkinson's Disease, HTN, depression presents with bilateral leg swelling,Acute on Chronic  CHF (BNP 10k), TTE ef 35%, mod- severe LV dysfunction, hypokinesis, decreased Rv Fxn, B/L pleural effusion, 1.5 fluid restriction, s/p IV lasix , NSTEMI, Trops > 87 >94>88  New [facility] ,  New HD s/p [facility]. 1st HD initiated on 10/26 - met acidisos, ANemia, With abominal pain found to have b/l retro bleed, heparin stopped 10/26 now with worsened abominal pain, decreasing h/h despite transfusion , concern for active bleed    1. RP bleed- surgery called. Cased discussed with Dr Rogers, CTA ordred to rule out bleed. Continue current tranfusion ( will be total of 3 units )_ repeat CBC Q6 hours, VS Q 4 hours. oxycodone for pain.   FOllow closely. IF decompensates will call micu Medicine NP Note     ORLIN HARDIN  [id number]  Allergies    adhesives (Rash)  latex (Urticaria)  No Known Drug Allergies    Intolerances               Pt with noted decreased h/h despite transfusion, worsened abdominal pain, Left flank pain today. Pt states pain began yesterday and has significantly increased since last night. Pt requesting to get out of bed as back is very painful. Mild nausea noted. Pt with no vomiting. No diarrhea. NO CP, NO HA.     Vital Signs Last 24 Hrs  T(C): 36.4 (10-27-21 @ 18:29), Max: 36.7 (10-27-21 @ 07:53)  T(F): 97.5 (10-27-21 @ 18:29), Max: 98.1 (10-27-21 @ 09:57)  HR: 87 (10-27-21 @ 18:29) (60 - 87)  BP: 115/67 (10-27-21 @ 18:29) (115/67 - 167/69)  BP(mean): --  RR: 18 (10-27-21 @ 18:29) (17 - 18)  SpO2: 100% (10-27-21 @ 18:29) (95% - 100%)  Daily     Daily Weight in k.4 (27 Oct 2021 08:43)  I&O's Summary    26 Oct 2021 07:01  -  27 Oct 2021 07:00  --------------------------------------------------------  IN: 360 mL / OUT: 1600 mL / NET: -1240 mL    6a labs 6.5/19.1                        6.7    16.52 )-----------( 241      ( 27 Oct 2021 16:58 )             20.4     10-27    135  |  98  |  105<H>  ----------------------------<  197<H>  4.6   |  18<L>  |  5.52<H>    Ca    9.1      27 Oct 2021 16:58  Phos  4.6     10-27  Mg     2.0     10-27    TPro  6.2  /  Alb  2.5<L>  /  TBili  0.6  /  DBili  x   /  AST  12  /  ALT  6<L>  /  AlkPhos  64  10-27    PT/INR - ( 26 Oct 2021 11:06 )   PT: 13.2 sec;   INR: 1.11 ratio       PTT - ( 26 Oct 2021 11:06 )  PTT:79.0 sec      ABG - ( 27 Oct 2021 16:55 )  pH, Arterial: 7.38  pH, Blood: x     /  pCO2: 39    /  pO2: 125   / HCO3: 23    / Base Excess: -1.9  /  SaO2: 100.0     Radiology: CT abdominal 10/26/21: Bilateral ill-defined retroperitoneal hemorrhage.Moderate left and small right pleural effusions.  Right-sided ground glass opacities of uncertain etiology.      PHYSICAL EXAM:  GENERAL: abdominal distress noted   CHEST/LUNG: marvin clear   HEART: regular , 70s   ABDOMEN: guarding. Pain to lower abdomen with palpation. Bowel sounds present   EXTREMITIES:  2+ Peripheral Pulses, No clubbing, cyanosis, or edema  PSYCH: AAOx3  NEUROLOGY: non-focal  SKIN: No rashes or lesions    Assessment/Plan: HPI:  72yo M w/ PMHx of CAD (s/p CABG ), CKD (unknown stage), DM2, Parkinson's Disease, HTN, depression presents with bilateral leg swelling,Acute on Chronic  CHF (BNP 10k), TTE ef 35%, mod- severe LV dysfunction, hypokinesis, decreased Rv Fxn, B/L pleural effusion, 1.5 fluid restriction, s/p IV lasix , NSTEMI, Trops > 87 >94>88  New [facility] ,  New HD s/p [facility]. 1st HD initiated on 10/26 - met acidisos, Anemia, With abdominal pain found to have b/l retro bleed, heparin stopped 10/26 now with worsened abominal pain, decreasing h/h despite transfusion , concern for active bleed    1. RP bleed- surgery called. Cased discussed with Dr Rogers and Dr. Frias. NO contraindication to contrast. Pt will have HD today.  CTA ordred to rule out bleed. Continue current tranfusion ( will be total of 3 units )_ repeat CBC Q6 hours, VS Q 4 hours. oxycodone for pain.   FOllow closely. IF decompensates will call micu

## 2021-10-27 NOTE — CONSULT NOTE ADULT - SUBJECTIVE AND OBJECTIVE BOX
GENERAL SURGERY CONSULT NOTE  --------------------------------------------------------------------------------------------  HPI:  72yo M w/ PMHx of CAD (s/p CABG 2019), CKD (unknown stage), DM2, Parkinson's Disease, HTN, depression presents with bilateral leg swelling, patient's daughter in-law at bedside Yoly Quintero ([address] [place] [address] Nurse) provides the history, the daughter reports the patient is a poor historian, unable to remember having conversations with others and likely cannot weigh risk/benefits of medical conditions, she reports that he has had bilateral lower extremity swelling for the past few months, but over the past 2 weeks it has significantly worsened, he has further difficulty ambulating due to swelling, his outpatient provider attempted to manage with 20mg lasix and then increased to 40mg lasix but the patient never took the increased dose, his symptoms ar persistent throughout the day and are extremely severe, he has developed wounds of the legs with weeping of fluid due to the swelling, she reports that the patient is frequently non-compliant with medications and medical management, he lives at home with his son and daughter in law, he denies chest pain, shortness of breath, fever/chills, cough, sputum, in the ED, he was tachycardic but hemodynamically stable, afebrile, saturating well on room air, labs were significant for elevated BNP, elevated creatinine, imaging showed moderate left pleural effusion, patient was admitted to general medicine for cardiac workup.     Patient was started on Hep gtt. and developed anemia with abdominal pain, patient got CT non con yesterday that showed b/l RP hematoma.       PAST MEDICAL & SURGICAL HISTORY:  Hypertension  Diabetes Mellitus, Type 2  Hypercholesterolemia  Parkinson disease  CAD (coronary artery disease)  s/p 1 stent in 2010 and CABG 2019  S/P CABG (coronary artery bypass graft)  2019  S/P hip replacement, right  2016  Anxiety  Depression, major    FAMILY HISTORY:  Family history of hypertension  Family history of malaria  Family history of pulmonary fibrosis (Sibling)    SOCIAL HISTORY:     ALLERGIES: adhesives (Rash)  latex (Urticaria)  No Known Drug Allergies      HOME MEDICATIONS:     CURRENT MEDICATIONS  MEDICATIONS (STANDING): albuterol/ipratropium for Nebulization 3 milliLiter(s) Nebulizer every 6 hours  ascorbic acid 500 milliGRAM(s) Oral daily  aspirin enteric coated 81 milliGRAM(s) Oral daily  atorvastatin 80 milliGRAM(s) Oral at bedtime  calcium acetate 1334 milliGRAM(s) Oral three times a day with meals  carbidopa/levodopa  25/100 2.5 Tablet(s) Oral <User Schedule>  carbidopa/levodopa  25/100 2 Tablet(s) Oral <User Schedule>  cholecalciferol 1000 Unit(s) Oral daily  dextrose 40% Gel 15 Gram(s) Oral once  dextrose 5%. 1000 milliLiter(s) IV Continuous <Continuous>  dextrose 5%. 1000 milliLiter(s) IV Continuous <Continuous>  dextrose 50% Injectable 25 Gram(s) IV Push once  dextrose 50% Injectable 12.5 Gram(s) IV Push once  dextrose 50% Injectable 25 Gram(s) IV Push once  epoetin mari-epbx (RETACRIT) Injectable 88255 Unit(s) IV Push once  folic acid 1 milliGRAM(s) Oral daily  glucagon  Injectable 1 milliGRAM(s) IntraMuscular once  insulin glargine Injectable (LANTUS) 6 Unit(s) SubCutaneous at bedtime  insulin lispro (ADMELOG) corrective regimen sliding scale   SubCutaneous three times a day before meals  insulin lispro (ADMELOG) corrective regimen sliding scale   SubCutaneous at bedtime  levothyroxine 25 MICROGram(s) Oral daily  metoprolol tartrate 25 milliGRAM(s) Oral two times a day  Nephro-celeste 1 Tablet(s) Oral daily  oxyCODONE    IR 5 milliGRAM(s) Oral once  trihexyphenidyl 2 milliGRAM(s) Oral three times a day    MEDICATIONS (PRN):acetaminophen     Tablet .. 650 milliGRAM(s) Oral every 6 hours PRN Mild Pain (1 - 3)  guaiFENesin Oral Liquid (Sugar-Free) 200 milliGRAM(s) Oral every 6 hours PRN Cough  oxyCODONE    IR 5 milliGRAM(s) Oral every 8 hours PRN Severe Pain (7 - 10)  sodium chloride 0.9% lock flush 10 milliLiter(s) IV Push every 1 hour PRN Pre/post blood products, medications, blood draw, and to maintain line patency    --------------------------------------------------------------------------------------------    Vitals:   T(C): 36.7 (10-27-21 @ 09:57), Max: 36.9 (10-26-21 @ 15:18)  HR: 60 (10-27-21 @ 09:57) (60 - 93)  BP: 165/67 (10-27-21 @ 09:57) (132/71 - 165/67)  RR: 18 (10-27-21 @ 09:57) (17 - 18)  SpO2: 99% (10-27-21 @ 09:57) (94% - 99%)  CAPILLARY BLOOD GLUCOSE      POCT Blood Glucose.: 138 mg/dL (27 Oct 2021 07:34)  POCT Blood Glucose.: 162 mg/dL (26 Oct 2021 22:04)  POCT Blood Glucose.: 158 mg/dL (26 Oct 2021 17:24)  POCT Blood Glucose.: 135 mg/dL (26 Oct 2021 11:36)      10-26 @ 07:01  -  10-27 @ 07:00  --------------------------------------------------------  IN:    Oral Fluid: 360 mL  Total IN: 360 mL    OUT:    Other (mL): 500 mL    Voided (mL): 1100 mL  Total OUT: 1600 mL    Total NET: -1240 mL        Height (cm): 180.3 (10-20 @ 20:03)  Weight (kg): 106.6 (10-20 @ 20:03)  BMI (kg/m2): 32.8 (10-20 @ 20:03)  BSA (m2): 2.26 (10-20 @ 20:03)      PHYSICAL EXAM:   General: NAD, Lying in bed comfortably  Neuro: Alert and answering questions appropriately   HEENT: Grossly normal, EOMI  Cardio: No peripherial edema  Resp: Good effort, no signs of respiratory distress  GI/Abd: Soft, nondistended, LLQ tenderness with firm mass underneath, ecchymosis, no rebound/guarding,.   Vascular: All 4 extremities warm  Musculoskeletal: All 4 extremities moving spontaneously, no limitations  --------------------------------------------------------------------------------------------    LABS  CBC (10-27 @ 05:02)                              6.5<LL>                         14.28<H>  )----------------(  224        --    % Neutrophils, --    % Lymphocytes, ANC: --                                  19.1<LL>  CBC (10-26 @ 19:21)                              6.5<LL>                         12.81<H>  )----------------(  203        --    % Neutrophils, --    % Lymphocytes, ANC: --                                  19.7<LL>    BMP (10-27 @ 05:02)             134<L>  |  99      |  99<H> 		Ca++ --      Ca 9.1                ---------------------------------( 122<H>		Mg 2.0                4.1     |  19<L>   |  5.23<H>			Ph 4.6<H>  BMP (10-26 @ 07:45)             134<L>  |  99      |  140<H>		Ca++ --      Ca 9.3                ---------------------------------( 124<H>		Mg --                 4.9     |  15<L>   |  7.43<H>			Ph --        LFTs (10-27 @ 05:02)      TPro 6.2 / Alb 2.6<L> / TBili 0.6 / DBili -- / AST 10 / ALT <5<L> / AlkPhos 66    Coags (10-26 @ 11:06)  aPTT 79.0<H> / INR 1.11 / PT 13.2          --------------------------------------------------------------------------------------------    MICROBIOLOGY    -> .Blood Blood-Peripheral Culture (10-21 @ 06:11)       Growth in aerobic bottle: Gram positive cocci in pairs    Blood Culture PCR    No Growth Final      --------------------------------------------------------------------------------------------    IMAGING  < from: CT Abdomen and Pelvis No Cont (10.26.21 @ 13:39) >  IMPRESSION: Bilateral ill-defined retroperitoneal hemorrhage.    Moderate left and small right pleural effusions.    Right-sided ground glass opacities of uncertain etiology.        < end of copied text >      --------------------------------------------------------------------------------------------

## 2021-10-27 NOTE — CHART NOTE - NSCHARTNOTEFT_GEN_A_CORE
MICU Accept Note    Duncan Hairston MD  Internal Medicine, PGY-3  Pager: [phone] ([facility]) / [phone] ([facility])    HPI/INTERVAL HISTORY:  72yo M w/ PMH of CAD (s/p CABG 2019), CKD (unknown stage), DM2, Parkinson's Disease, HTN, depression p/w b/l LE edema for the past few months. Over the past 2wks it has significantly worsened, he has further difficulty ambulating due to swelling. Pt initially treated outpt w/ PO Lasix w/ persistent symptoms c/b wounds of the legs with weeping of fluid due to the swelling. In the ED he denied F/C, CP, SOB, cough. In the ED he was tachycardic but HDS, afebrile, saturating well on RA, labs significant for elevated BNP, elevated creatinine, imaging showed moderate L pleural effusion, admitted to general medicine for cardiac w/u.    Pt was started on Hep gtt for NSTEMI and new Aflutter and started on HD for acute on chronic CKD. ON he developed of back pain and anemia, CT non contrast showed b/l RP hematoma. A repeat CT w/ IV contrast performed today to assess progression of RP bleed as pt w/ persistent anemia which showed worsening of the L hematoma w/ possible active bleeding noted. IR consulted for intervention w/ no intervention planned at this time.     MICU consulted as pt w/ drop in SBP from 160s to 115s w/ persistent anemia and plan for HD.     MEDICATIONS  (STANDING):  albuterol/ipratropium for Nebulization 3 milliLiter(s) Nebulizer every 6 hours  ascorbic acid 500 milliGRAM(s) Oral daily  aspirin enteric coated 81 milliGRAM(s) Oral daily  atorvastatin 80 milliGRAM(s) Oral at bedtime  calcium acetate 1334 milliGRAM(s) Oral three times a day with meals  carbidopa/levodopa  25/100 2.5 Tablet(s) Oral <User Schedule>  carbidopa/levodopa  25/100 2 Tablet(s) Oral <User Schedule>  chlorhexidine 2% Cloths 1 Application(s) Topical daily  chlorhexidine 4% Liquid 1 Application(s) Topical <User Schedule>  cholecalciferol 1000 Unit(s) Oral daily  dextrose 40% Gel 15 Gram(s) Oral once  dextrose 5%. 1000 milliLiter(s) (50 mL/Hr) IV Continuous <Continuous>  dextrose 5%. 1000 milliLiter(s) (100 mL/Hr) IV Continuous <Continuous>  dextrose 50% Injectable 25 Gram(s) IV Push once  dextrose 50% Injectable 12.5 Gram(s) IV Push once  dextrose 50% Injectable 25 Gram(s) IV Push once  epoetin mari-epbx (RETACRIT) Injectable 15985 Unit(s) IV Push once  folic acid 1 milliGRAM(s) Oral daily  glucagon  Injectable 1 milliGRAM(s) IntraMuscular once  insulin glargine Injectable (LANTUS) 6 Unit(s) SubCutaneous at bedtime  insulin lispro (ADMELOG) corrective regimen sliding scale   SubCutaneous three times a day before meals  insulin lispro (ADMELOG) corrective regimen sliding scale   SubCutaneous at bedtime  levothyroxine 25 MICROGram(s) Oral daily  metoprolol tartrate 25 milliGRAM(s) Oral two times a day  mupirocin 2% Ointment 1 Application(s) Both Nostrils two times a day  Nephro-celeste 1 Tablet(s) Oral daily  trihexyphenidyl 2 milliGRAM(s) Oral three times a day    MEDICATIONS  (PRN):  acetaminophen     Tablet .. 650 milliGRAM(s) Oral every 6 hours PRN Mild Pain (1 - 3)  guaiFENesin Oral Liquid (Sugar-Free) 200 milliGRAM(s) Oral every 6 hours PRN Cough  oxyCODONE    IR 5 milliGRAM(s) Oral every 8 hours PRN Severe Pain (7 - 10)  sodium chloride 0.9% lock flush 10 milliLiter(s) IV Push every 1 hour PRN Pre/post blood products, medications, blood draw, and to maintain line patency    PAST MEDICAL & SURGICAL HISTORY:  - Hypertension  - Diabetes Mellitus, Type 2  - Hypercholesterolemia  - Parkinson disease  - CAD (coronary artery disease) s/p 1 stent in 2010 and CABG 2019  - S/P hip replacement, right 2016  - Anxiety  - Depression, major    FAMILY HISTORY:  Family history of hypertension  Family history of malaria  Family history of pulmonary fibrosis (Sibling)    OBJECTIVE:  ICU Vital Signs Last 24 Hrs  T(C): 36.4 (27 Oct 2021 18:29), Max: 36.7 (27 Oct 2021 07:53)  T(F): 97.5 (27 Oct 2021 18:29), Max: 98.1 (27 Oct 2021 09:57)  HR: 87 (27 Oct 2021 18:29) (60 - 87)  BP: 115/67 (27 Oct 2021 18:29) (115/67 - 167/69)  RR: 18 (27 Oct 2021 18:29) (17 - 18)  SpO2: 100% (27 Oct 2021 18:29) (97% - 100%)    I&O  10-26 @ 07:01  -  10-27 @ 07:00  --------------------------------------------------------  IN: 360 mL / OUT: 1600 mL / NET: -1240 mL    CAPILLARY BLOOD GLUCOSE  POCT Blood Glucose.: 202 mg/dL (27 Oct 2021 16:38)    PHYSICAL EXAM:  GENERAL: NAD, awake and oriented, lethargic but arousable, likely 2/2 ativan  HEAD:  Atraumatic, Normocephalic  CHEST/LUNG: CTABL, No wheeze  HEART: RRR  ABDOMEN: Soft, Nontender, Nondistended; Bowel sounds present  EXTREMITIES:  2+ Peripheral Pulses, No clubbing, cyanosis, or edema  NEURO: No focal deficits    LABS:                      6.7    16.52 )-----------( 241      ( 27 Oct 2021 16:58 )             20.4     Hgb Trend: 6.7<--, 7.1<--, 6.5<--, 6.5<--, 6.8<--  10-27    135  |  98  |  105<H>  ----------------------------<  197<H>  4.6   |  18<L>  |  5.52<H>    Ca    9.1      27 Oct 2021 16:58  Phos  4.6     10-27  Mg     2.0     10-27    TPro  6.2  /  Alb  2.5<L>  /  TBili  0.6  /  DBili  x   /  AST  12  /  ALT  6<L>  /  AlkPhos  64  10-27    Creatinine Trend: 5.52<--, 5.23<--, 7.43<--, 7.23<--, 6.75<--, 6.43<--    PT/INR - ( 26 Oct 2021 11:06 )   PT: 13.2 sec;   INR: 1.11 ratio    PTT - ( 26 Oct 2021 11:06 )  PTT:79.0 sec    Arterial Blood Gas:  10-27 @ 16:55  7.38/39/125/23/100.0/-1.9  ABG lactate: --    IMAGING:  CT Abdomen and Pelvis w/ IV Cont (10.27.21 @ 17:13)  INTERPRETATION:    - Bilateral retroperitoneal hematomas, the left of which has significantly increased in size since prior from 10/26/21.  - Within the left retroperitoneal hematoma there is a linear area of enhancement (series 6, image 117) that is suspicious for active bleed.    CT Head No Cont (10.27.21 @ 17:04)  IMPRESSION:  No significant change from the prior exam.  - No intracranial hemorrhage.  - Right parietal convexity meningioma without significant interval change.    ASSESSMENT AND PLAN:      # Neuro:  //       # CV:  //     # Pulm:  //     # GI:  //  - Diet:    # Renal:  //    # Endo  //     # ID:  //  Pt without strong objective or clinical evidence of infection. Will observe off antibiotics    # Heme/Onc:  //    # Skin:  // MICU Accept Note    Duncan Hairston MD  Internal Medicine, PGY-3  Pager: [phone] ([facility]) / [phone] ([facility])    HPI/INTERVAL HISTORY:  72yo M w/ PMH of CAD (s/p CABG 2019), CKD (unknown stage), DM2, Parkinson's Disease, HTN, depression p/w b/l LE edema for the past few months. Over the past 2wks it has significantly worsened, he has further difficulty ambulating due to swelling. Pt initially treated outpt w/ PO Lasix w/ persistent symptoms c/b wounds of the legs with weeping of fluid due to the swelling. In the ED he denied F/C, CP, SOB, cough. In the ED he was tachycardic but HDS, afebrile, saturating well on RA, labs significant for elevated BNP, elevated creatinine, imaging showed moderate L pleural effusion, admitted to general medicine for cardiac w/u.    Pt was started on Hep gtt for NSTEMI and new Aflutter and started on HD for acute on chronic CKD. ON he developed of back pain and anemia, CT non contrast showed b/l RP hematoma. A repeat CT w/ IV contrast performed today to assess progression of RP bleed as pt w/ persistent anemia which showed worsening of the L hematoma w/ possible active bleeding noted. IR consulted for intervention w/ no intervention planned at this time.     MICU consulted as pt w/ drop in SBP from 160s to 115s w/ persistent anemia and plan for HD.     MEDICATIONS  (STANDING):  albuterol/ipratropium for Nebulization 3 milliLiter(s) Nebulizer every 6 hours  ascorbic acid 500 milliGRAM(s) Oral daily  aspirin enteric coated 81 milliGRAM(s) Oral daily  atorvastatin 80 milliGRAM(s) Oral at bedtime  calcium acetate 1334 milliGRAM(s) Oral three times a day with meals  carbidopa/levodopa  25/100 2.5 Tablet(s) Oral <User Schedule>  carbidopa/levodopa  25/100 2 Tablet(s) Oral <User Schedule>  chlorhexidine 2% Cloths 1 Application(s) Topical daily  chlorhexidine 4% Liquid 1 Application(s) Topical <User Schedule>  cholecalciferol 1000 Unit(s) Oral daily  dextrose 40% Gel 15 Gram(s) Oral once  dextrose 5%. 1000 milliLiter(s) (50 mL/Hr) IV Continuous <Continuous>  dextrose 5%. 1000 milliLiter(s) (100 mL/Hr) IV Continuous <Continuous>  dextrose 50% Injectable 25 Gram(s) IV Push once  dextrose 50% Injectable 12.5 Gram(s) IV Push once  dextrose 50% Injectable 25 Gram(s) IV Push once  epoetin mari-epbx (RETACRIT) Injectable 29667 Unit(s) IV Push once  folic acid 1 milliGRAM(s) Oral daily  glucagon  Injectable 1 milliGRAM(s) IntraMuscular once  insulin glargine Injectable (LANTUS) 6 Unit(s) SubCutaneous at bedtime  insulin lispro (ADMELOG) corrective regimen sliding scale   SubCutaneous three times a day before meals  insulin lispro (ADMELOG) corrective regimen sliding scale   SubCutaneous at bedtime  levothyroxine 25 MICROGram(s) Oral daily  metoprolol tartrate 25 milliGRAM(s) Oral two times a day  mupirocin 2% Ointment 1 Application(s) Both Nostrils two times a day  Nephro-celeste 1 Tablet(s) Oral daily  trihexyphenidyl 2 milliGRAM(s) Oral three times a day    MEDICATIONS  (PRN):  acetaminophen     Tablet .. 650 milliGRAM(s) Oral every 6 hours PRN Mild Pain (1 - 3)  guaiFENesin Oral Liquid (Sugar-Free) 200 milliGRAM(s) Oral every 6 hours PRN Cough  oxyCODONE    IR 5 milliGRAM(s) Oral every 8 hours PRN Severe Pain (7 - 10)  sodium chloride 0.9% lock flush 10 milliLiter(s) IV Push every 1 hour PRN Pre/post blood products, medications, blood draw, and to maintain line patency    PAST MEDICAL & SURGICAL HISTORY:  - Hypertension  - Diabetes Mellitus, Type 2  - Hypercholesterolemia  - Parkinson disease  - CAD (coronary artery disease) s/p 1 stent in 2010 and CABG 2019  - S/P hip replacement, right 2016  - Anxiety  - Depression, major    FAMILY HISTORY:  Family history of hypertension  Family history of malaria  Family history of pulmonary fibrosis (Sibling)    OBJECTIVE:  ICU Vital Signs Last 24 Hrs  T(C): 36.4 (27 Oct 2021 18:29), Max: 36.7 (27 Oct 2021 07:53)  T(F): 97.5 (27 Oct 2021 18:29), Max: 98.1 (27 Oct 2021 09:57)  HR: 87 (27 Oct 2021 18:29) (60 - 87)  BP: 115/67 (27 Oct 2021 18:29) (115/67 - 167/69)  RR: 18 (27 Oct 2021 18:29) (17 - 18)  SpO2: 100% (27 Oct 2021 18:29) (97% - 100%)    I&O  10-26 @ 07:01  -  10-27 @ 07:00  --------------------------------------------------------  IN: 360 mL / OUT: 1600 mL / NET: -1240 mL    CAPILLARY BLOOD GLUCOSE  POCT Blood Glucose.: 202 mg/dL (27 Oct 2021 16:38)    PHYSICAL EXAM:  GENERAL: NAD, awake and oriented, lethargic but arousable, likely 2/2 ativan  HEAD:  Atraumatic, Normocephalic  CHEST/LUNG: CTABL, No wheeze  HEART: RRR  ABDOMEN: Soft, Nontender, Nondistended; Bowel sounds present  EXTREMITIES:  2+ Peripheral Pulses, No clubbing, cyanosis, or edema  NEURO: No focal deficits    LABS:                      6.7    16.52 )-----------( 241      ( 27 Oct 2021 16:58 )             20.4     Hgb Trend: 6.7<--, 7.1<--, 6.5<--, 6.5<--, 6.8<--  10-27    135  |  98  |  105<H>  ----------------------------<  197<H>  4.6   |  18<L>  |  5.52<H>    Ca    9.1      27 Oct 2021 16:58  Phos  4.6     10-27  Mg     2.0     10-27    TPro  6.2  /  Alb  2.5<L>  /  TBili  0.6  /  DBili  x   /  AST  12  /  ALT  6<L>  /  AlkPhos  64  10-27    Creatinine Trend: 5.52<--, 5.23<--, 7.43<--, 7.23<--, 6.75<--, 6.43<--    PT/INR - ( 26 Oct 2021 11:06 )   PT: 13.2 sec;   INR: 1.11 ratio    PTT - ( 26 Oct 2021 11:06 )  PTT:79.0 sec    Arterial Blood Gas:  10-27 @ 16:55  7.38/39/125/23/100.0/-1.9  ABG lactate: --    IMAGING:  CT Abdomen and Pelvis w/ IV Cont (10.27.21 @ 17:13)  INTERPRETATION:    - Bilateral retroperitoneal hematomas, the left of which has significantly increased in size since prior from 10/26/21.  - Within the left retroperitoneal hematoma there is a linear area of enhancement (series 6, image 117) that is suspicious for active bleed.    CT Head No Cont (10.27.21 @ 17:04)  IMPRESSION:  No significant change from the prior exam.  - No intracranial hemorrhage.  - Right parietal convexity meningioma without significant interval change.    ASSESSMENT AND PLAN:  72yo M w/ PMH of CAD (s/p CABG 2019), CKD (unknown stage), DM2, Parkinson's Disease, HTN, depression p/w b/l LE edema found to have ARF and NSTEMI w/ new Aflutter started on heparin gtt and HD. Course c/b RP bleed w/ anemia, admitted to the MICU for closer monitoring while on HD.    # Neuro:  - A&Ox1  - Likely i/s/o sedation administered prior to CT  - Would continue to monitor    // Parkinson's Dz  - c/w Carbidopa/Levodopa  - c/w Trihexyphenidyl    # CV:  // CAD/ NSTEMI  - TTE mod to severe LVS dysfxn, hypokinesis anterior wall  - Holding heparin gtt and ASA given increasing RP bleed  - c/w Atorvastatin  - Unstable for cath at this moment    // Aflutter  - c/w Lopressor 25mg BID  - Monitor for hypotension given RP bleed    # Pulm:  - On 4L NC  - Wean as tolerated    # GI:  - No active issues  - Diet: Renal  - Fluid Restrict to 1.5L    # Renal:  // ABBY on CKD  - s/p shiley placement w/ IR  - Planned for HD in the MICU tonight   - c/w Phoslo 1337 tid    # Endo  // DM2  - A1c 6.4%  - c/w Lantus 6U qHS  - c/w ISS    // Hypothyroid  - c/w Synthroid 25mcg QD    # ID:  - Pt without strong objective or clinical evidence of infection. Will observe off antibiotics    # Heme/Onc:  - Holding AC i/s/o RP bleed    # Skin:  // No active issues    # Ethics   - FULL CODE MICU Accept Note    Duncan Hairston MD  Internal Medicine, PGY-3  Pager: [phone] ([facility]) / [phone] ([facility])    HPI/INTERVAL HISTORY:  70yo M w/ PMH of CAD (s/p CABG 2019), CKD (unknown stage), DM2, Parkinson's Disease, HTN, depression p/w b/l LE edema for the past few months. Over the past 2wks it has significantly worsened, he has further difficulty ambulating due to swelling. Pt initially treated outpt w/ PO Lasix w/ persistent symptoms c/b wounds of the legs with weeping of fluid due to the swelling. In the ED he denied F/C, CP, SOB, cough. In the ED he was tachycardic but HDS, afebrile, saturating well on RA, labs significant for elevated BNP, elevated creatinine, imaging showed moderate L pleural effusion, admitted to general medicine for cardiac w/u.    Pt was started on Hep gtt for NSTEMI and new Aflutter and started on HD for acute on chronic CKD. ON he developed of back pain and anemia, CT non contrast showed b/l RP hematoma. A repeat CT w/ IV contrast performed today to assess progression of RP bleed as pt w/ persistent anemia which showed worsening of the L hematoma w/ possible active bleeding noted. IR consulted for intervention w/ no intervention planned at this time.     MICU consulted as pt w/ drop in SBP from 160s to 115s w/ persistent anemia and plan for HD.     MEDICATIONS  (STANDING):  albuterol/ipratropium for Nebulization 3 milliLiter(s) Nebulizer every 6 hours  ascorbic acid 500 milliGRAM(s) Oral daily  aspirin enteric coated 81 milliGRAM(s) Oral daily  atorvastatin 80 milliGRAM(s) Oral at bedtime  calcium acetate 1334 milliGRAM(s) Oral three times a day with meals  carbidopa/levodopa  25/100 2.5 Tablet(s) Oral <User Schedule>  carbidopa/levodopa  25/100 2 Tablet(s) Oral <User Schedule>  chlorhexidine 2% Cloths 1 Application(s) Topical daily  chlorhexidine 4% Liquid 1 Application(s) Topical <User Schedule>  cholecalciferol 1000 Unit(s) Oral daily  dextrose 40% Gel 15 Gram(s) Oral once  dextrose 5%. 1000 milliLiter(s) (50 mL/Hr) IV Continuous <Continuous>  dextrose 5%. 1000 milliLiter(s) (100 mL/Hr) IV Continuous <Continuous>  dextrose 50% Injectable 25 Gram(s) IV Push once  dextrose 50% Injectable 12.5 Gram(s) IV Push once  dextrose 50% Injectable 25 Gram(s) IV Push once  epoetin mari-epbx (RETACRIT) Injectable 23619 Unit(s) IV Push once  folic acid 1 milliGRAM(s) Oral daily  glucagon  Injectable 1 milliGRAM(s) IntraMuscular once  insulin glargine Injectable (LANTUS) 6 Unit(s) SubCutaneous at bedtime  insulin lispro (ADMELOG) corrective regimen sliding scale   SubCutaneous three times a day before meals  insulin lispro (ADMELOG) corrective regimen sliding scale   SubCutaneous at bedtime  levothyroxine 25 MICROGram(s) Oral daily  metoprolol tartrate 25 milliGRAM(s) Oral two times a day  mupirocin 2% Ointment 1 Application(s) Both Nostrils two times a day  Nephro-celeste 1 Tablet(s) Oral daily  trihexyphenidyl 2 milliGRAM(s) Oral three times a day    MEDICATIONS  (PRN):  acetaminophen     Tablet .. 650 milliGRAM(s) Oral every 6 hours PRN Mild Pain (1 - 3)  guaiFENesin Oral Liquid (Sugar-Free) 200 milliGRAM(s) Oral every 6 hours PRN Cough  oxyCODONE    IR 5 milliGRAM(s) Oral every 8 hours PRN Severe Pain (7 - 10)  sodium chloride 0.9% lock flush 10 milliLiter(s) IV Push every 1 hour PRN Pre/post blood products, medications, blood draw, and to maintain line patency    PAST MEDICAL & SURGICAL HISTORY:  - Hypertension  - Diabetes Mellitus, Type 2  - Hypercholesterolemia  - Parkinson disease  - CAD (coronary artery disease) s/p 1 stent in 2010 and CABG 2019  - S/P hip replacement, right 2016  - Anxiety  - Depression, major    FAMILY HISTORY:  Family history of hypertension  Family history of malaria  Family history of pulmonary fibrosis (Sibling)    OBJECTIVE:  ICU Vital Signs Last 24 Hrs  T(C): 36.4 (27 Oct 2021 18:29), Max: 36.7 (27 Oct 2021 07:53)  T(F): 97.5 (27 Oct 2021 18:29), Max: 98.1 (27 Oct 2021 09:57)  HR: 87 (27 Oct 2021 18:29) (60 - 87)  BP: 115/67 (27 Oct 2021 18:29) (115/67 - 167/69)  RR: 18 (27 Oct 2021 18:29) (17 - 18)  SpO2: 100% (27 Oct 2021 18:29) (97% - 100%)    I&O  10-26 @ 07:01  -  10-27 @ 07:00  --------------------------------------------------------  IN: 360 mL / OUT: 1600 mL / NET: -1240 mL    CAPILLARY BLOOD GLUCOSE  POCT Blood Glucose.: 202 mg/dL (27 Oct 2021 16:38)    PHYSICAL EXAM:  GENERAL: NAD, awake and oriented, lethargic but arousable, likely 2/2 ativan  HEAD:  Atraumatic, Normocephalic  CHEST/LUNG: CTABL, No wheeze  HEART: RRR  ABDOMEN: Soft, Nontender, Nondistended; Bowel sounds present  EXTREMITIES:  2+ Peripheral Pulses, No clubbing, cyanosis, or edema  NEURO: No focal deficits    LABS:                      6.7    16.52 )-----------( 241      ( 27 Oct 2021 16:58 )             20.4     Hgb Trend: 6.7<--, 7.1<--, 6.5<--, 6.5<--, 6.8<--  10-27    135  |  98  |  105<H>  ----------------------------<  197<H>  4.6   |  18<L>  |  5.52<H>    Ca    9.1      27 Oct 2021 16:58  Phos  4.6     10-27  Mg     2.0     10-27    TPro  6.2  /  Alb  2.5<L>  /  TBili  0.6  /  DBili  x   /  AST  12  /  ALT  6<L>  /  AlkPhos  64  10-27    Creatinine Trend: 5.52<--, 5.23<--, 7.43<--, 7.23<--, 6.75<--, 6.43<--    PT/INR - ( 26 Oct 2021 11:06 )   PT: 13.2 sec;   INR: 1.11 ratio    PTT - ( 26 Oct 2021 11:06 )  PTT:79.0 sec    Arterial Blood Gas:  10-27 @ 16:55  7.38/39/125/23/100.0/-1.9  ABG lactate: --    IMAGING:  CT Abdomen and Pelvis w/ IV Cont (10.27.21 @ 17:13)  INTERPRETATION:    - Bilateral retroperitoneal hematomas, the left of which has significantly increased in size since prior from 10/26/21.  - Within the left retroperitoneal hematoma there is a linear area of enhancement (series 6, image 117) that is suspicious for active bleed.    CT Head No Cont (10.27.21 @ 17:04)  IMPRESSION:  No significant change from the prior exam.  - No intracranial hemorrhage.  - Right parietal convexity meningioma without significant interval change.    ASSESSMENT AND PLAN:  70yo M w/ PMH of CAD (s/p CABG 2019), CKD (unknown stage), DM2, Parkinson's Disease, HTN, depression p/w b/l LE edema found to have ARF and NSTEMI w/ new Aflutter started on heparin gtt and HD. Course c/b RP bleed w/ anemia, admitted to the MICU for closer monitoring while on HD.    # Neuro:  - A&Ox1  - Likely i/s/o sedation administered prior to CT  - Would continue to monitor    // Parkinson's Dz  - c/w Carbidopa/Levodopa  - c/w Trihexyphenidyl    # CV:  // CAD/ NSTEMI  - TTE mod to severe LVS dysfxn, hypokinesis anterior wall  - Holding heparin gtt and ASA given increasing RP bleed  - c/w Atorvastatin  - Unstable for cath at this moment    // Aflutter  - c/w Lopressor 25mg BID  - Monitor for hypotension given RP bleed    # Pulm:  - On 4L NC  - Wean as tolerated    # GI:  - No active issues  - Diet: Renal  - Fluid Restrict to 1.5L    # Renal:  // ABBY on CKD  - s/p shiley placement w/ IR  - Planned for HD in the MICU tonight   - c/w Phoslo 1337 tid    # Endo  // DM2  - A1c 6.4%  - c/w Lantus 6U qHS  - c/w ISS    // Hypothyroid  - c/w Synthroid 25mcg QD    # ID:  - Pt without strong objective or clinical evidence of infection. Will observe off antibiotics    # Heme/Onc:  - Holding AC i/s/o RP bleed    # Skin:  // No active issues    # Ethics   - FULL CODE           Critical Care Attending Attestation:   71M Hx T2DM, Parkinson, Depression, CABG, CAD Stents, ADHF, CKD Unknown Stage admitted for A.Fib/ A .Flutter RVR, NSTEMI and ADHF, ARF on Hemodialysis started on IV Heparin found Bilateral Large RP Bleed with Post Hemorrhagic Anemia.  - Awake, slow to verbal stimuli and Encephalopathy Grade 2   - 4Li NCO2 SpO2 98%   - Hemodynamically stable   - Hold ASA and Heparin   - CT Abdomen notable for expanding RP Bleed f/u with Surgery and IR Service   - Will resume HD after DDAVP   - Serial CBC and CMPs     Patient seen and examined with ICU Resident/Fellow at bedside after lab data, medical records and radiology reports reviewed. I have read and agreeable in general with resident's Documented Note, Assessment and Management Plan which reflected my opinions from bedside round and discussion.   Total Critical Care Time = 45 Min excluding teaching and procedure activity.

## 2021-10-27 NOTE — PROGRESS NOTE ADULT - SUBJECTIVE AND OBJECTIVE BOX
Patient is a 71y old  Male who presents with a chief complaint of leg swelling (27 Oct 2021 13:58)      SUBJECTIVE / OVERNIGHT EVENTS:    Patient seen and examined.   pt co back pain and lower abd pain. son at bedside. received ativan for CT however, CT postponed. on 4L NC.     Vital Signs Last 24 Hrs  T(C): 36.4 (27 Oct 2021 11:25), Max: 36.9 (26 Oct 2021 15:18)  T(F): 97.6 (27 Oct 2021 11:25), Max: 98.4 (26 Oct 2021 15:18)  HR: 62 (27 Oct 2021 11:25) (60 - 93)  BP: 167/69 (27 Oct 2021 11:25) (133/70 - 167/69)  BP(mean): --  RR: 18 (27 Oct 2021 11:25) (17 - 18)  SpO2: 99% (27 Oct 2021 11:25) (94% - 99%)  I&O's Summary    26 Oct 2021 07:01  -  27 Oct 2021 07:00  --------------------------------------------------------  IN: 360 mL / OUT: 1600 mL / NET: -1240 mL        PE:  GENERAL: NAD, awake and oriented, lethargic but arousable, likely 2/2 ativan  HEAD:  Atraumatic, Normocephalic  CHEST/LUNG: CTABL, No wheeze  HEART: Regular rate and rhythm; no murmur  ABDOMEN: Soft, Nontender, Nondistended; Bowel sounds present  EXTREMITIES:  2+ Peripheral Pulses, No clubbing, cyanosis, or edema  NEURO: No focal deficits    LABS:                        7.1    16.38 )-----------( 224      ( 27 Oct 2021 14:38 )             21.3     10-27    134<L>  |  99  |  99<H>  ----------------------------<  122<H>  4.1   |  19<L>  |  5.23<H>    Ca    9.1      27 Oct 2021 05:02  Phos  4.6     10-27  Mg     2.0     10-27    TPro  6.2  /  Alb  2.6<L>  /  TBili  0.6  /  DBili  x   /  AST  10  /  ALT  <5<L>  /  AlkPhos  66  10-27    PT/INR - ( 26 Oct 2021 11:06 )   PT: 13.2 sec;   INR: 1.11 ratio         PTT - ( 26 Oct 2021 11:06 )  PTT:79.0 sec  CAPILLARY BLOOD GLUCOSE      POCT Blood Glucose.: 222 mg/dL (27 Oct 2021 11:38)  POCT Blood Glucose.: 138 mg/dL (27 Oct 2021 07:34)  POCT Blood Glucose.: 162 mg/dL (26 Oct 2021 22:04)  POCT Blood Glucose.: 158 mg/dL (26 Oct 2021 17:24)            RADIOLOGY & ADDITIONAL TESTS:    Imaging Personally Reviewed:  [x] YES  [ ] NO    Consultant(s) Notes Reviewed:  [x] YES  [ ] NO    MEDICATIONS  (STANDING):  albuterol/ipratropium for Nebulization 3 milliLiter(s) Nebulizer every 6 hours  ascorbic acid 500 milliGRAM(s) Oral daily  aspirin enteric coated 81 milliGRAM(s) Oral daily  atorvastatin 80 milliGRAM(s) Oral at bedtime  calcium acetate 1334 milliGRAM(s) Oral three times a day with meals  carbidopa/levodopa  25/100 2.5 Tablet(s) Oral <User Schedule>  carbidopa/levodopa  25/100 2 Tablet(s) Oral <User Schedule>  chlorhexidine 2% Cloths 1 Application(s) Topical daily  chlorhexidine 4% Liquid 1 Application(s) Topical <User Schedule>  cholecalciferol 1000 Unit(s) Oral daily  dextrose 40% Gel 15 Gram(s) Oral once  dextrose 5%. 1000 milliLiter(s) (50 mL/Hr) IV Continuous <Continuous>  dextrose 5%. 1000 milliLiter(s) (100 mL/Hr) IV Continuous <Continuous>  dextrose 50% Injectable 25 Gram(s) IV Push once  dextrose 50% Injectable 12.5 Gram(s) IV Push once  dextrose 50% Injectable 25 Gram(s) IV Push once  epoetin mari-epbx (RETACRIT) Injectable 72522 Unit(s) IV Push once  folic acid 1 milliGRAM(s) Oral daily  glucagon  Injectable 1 milliGRAM(s) IntraMuscular once  insulin glargine Injectable (LANTUS) 6 Unit(s) SubCutaneous at bedtime  insulin lispro (ADMELOG) corrective regimen sliding scale   SubCutaneous three times a day before meals  insulin lispro (ADMELOG) corrective regimen sliding scale   SubCutaneous at bedtime  levothyroxine 25 MICROGram(s) Oral daily  metoprolol tartrate 25 milliGRAM(s) Oral two times a day  mupirocin 2% Ointment 1 Application(s) Both Nostrils two times a day  Nephro-celeste 1 Tablet(s) Oral daily  trihexyphenidyl 2 milliGRAM(s) Oral three times a day    MEDICATIONS  (PRN):  acetaminophen     Tablet .. 650 milliGRAM(s) Oral every 6 hours PRN Mild Pain (1 - 3)  guaiFENesin Oral Liquid (Sugar-Free) 200 milliGRAM(s) Oral every 6 hours PRN Cough  oxyCODONE    IR 5 milliGRAM(s) Oral every 8 hours PRN Severe Pain (7 - 10)  sodium chloride 0.9% lock flush 10 milliLiter(s) IV Push every 1 hour PRN Pre/post blood products, medications, blood draw, and to maintain line patency      Care Discussed with Consultants/Other Providers [x] YES  [ ] NO    HEALTH ISSUES - PROBLEM Dx:  Acute heart failure    Atrial flutter    DM2 (diabetes mellitus, type 2)    CAD (coronary artery disease)    Parkinsons disease    Acute on chronic renal failure    NSTEMI (non-ST elevation myocardial infarction)    Hypertension    Acute kidney injury superimposed on CKD    Anemia    Hypothyroidism

## 2021-10-27 NOTE — RAPID RESPONSE TEAM SUMMARY - NSSITUATIONBACKGROUNDRRT_GEN_ALL_CORE
INCOMPLETE 71M w/ hx of CAD (s/p CABG 2019), CKD (unknown stage), DM2, Parkinson's Disease, HTN, depression, presents with worsening b/l leg swelling, particularly over 2 weeks prior to admission. Found to have NSTEMI with new onset acute heart failure (EF 35% on 10/21/21; previously EF 60-65%). Course c/b ABBY on CKD, aflutter, Hgb drops requiring pRBC transfusions, and concern for RP bleed on CT A/P non con on 10/26. RRT called today for AMS.    On arrival, pt appeared lethargic, but responsive to questioning and following commands. Pt only complained about abdominal pain. Noted to be AAOx0-1 (baseline AAOx3). VS: afebrile, SBP initially 140s-150s, HR 60s-90s aflutter on monitor, sating high 90s on 4L O2NC. Physical exam without focal deficits elicited, but significant for LLQ tenderness to palpation with firm ?mass underneath; no significant ecchymosis appreciated. Earlier labs notable for Hgb 6.5, given 1/2 units pRBC x2 today with post-transfusion Hgb of 7.1. CT A/P non con (10/26) notable for "bilateral ill-defined retroperitoneal hemorrhage." Evaluated by Surgery earlier in the day and found small RP hematoma on CT unlikely to be reason for pt's anemia. Primary team at bedside. Hep gtt reported to have been held since yesterday. Pt was also started on new HD yesterday.    Labs including CBC, CMP, ABG w/ lactate obtained. CT A/P w/ IV con already ordered by Primary team. CTH non con ordered for change in mental status. Pt taken for CT scans. CBC showed Hgb 6.7 (~2 hrs since last Hgb of 7.1), worsening leukocytosis to 16.5k. CMP notable for  and Cr 5.52. ABG showed Lactate 1.5 and PCO2 39. EKG ordered given Aflutter (per family at bedside, no known hx but appears to have been present during this hospitalization). During course of RRT, SBP fluctuated between 110s-150s. 1/2 uPRBC ordered given Hgb and concern for RP bleed. RRT ended with primary team by bedside for follow-up. 71M w/ hx of CAD (s/p CABG 2019), CKD (unknown stage), DM2, Parkinson's Disease, HTN, depression, presents with worsening b/l leg swelling, particularly over 2 weeks prior to admission. Found to have NSTEMI with new onset acute heart failure (EF 35% on 10/21/21; previously EF 60-65%). Course c/b ABBY on CKD, aflutter, Hgb drops requiring pRBC transfusions, and concern for RP bleed on CT A/P non con on 10/26. RRT called today for AMS.    On arrival, pt appeared lethargic, but responsive to questioning and following commands. Pt only complained about abdominal pain. Noted to be AAOx0-1 (baseline AAOx3). VS: afebrile, SBP initially 140s-150s, HR 60s-90s aflutter on monitor, sating high 90s on 4L O2NC. Physical exam without focal deficits elicited, but significant for LLQ tenderness to palpation with firm ?mass underneath; no significant ecchymosis appreciated. Earlier labs notable for Hgb 6.5, given 1/2 units pRBC x2 today with post-transfusion Hgb of 7.1. CT A/P non con (10/26) notable for "bilateral ill-defined retroperitoneal hemorrhage." Evaluated by Surgery earlier in the day and found small RP hematoma on CT unlikely to be reason for pt's anemia. Primary team/RN at bedside to provide collateral information. Hep gtt reported to have been held since yesterday. Pt was also started on new HD yesterday. Pt also received ativan and oxycodone ~11AM earlier in the day.    Labs including CBC, CMP, ABG w/ lactate obtained. CT A/P w/ IV con already ordered by Primary team. CTH non con ordered for change in mental status. Pt taken for CT scans. CBC showed Hgb 6.7 (~2 hrs since last Hgb of 7.1), worsening leukocytosis to 16.5k. CMP notable for  and Cr 5.52. ABG showed Lactate 1.5 and PCO2 39. EKG ordered given Aflutter (per family at bedside, no known hx but appears to have been present during this hospitalization). 1/2 uPRBC ordered given Hgb and concern for RP bleed. During course of RRT, SBP fluctuated between 110s-150s. HR remained 60s-90s in aflutter. RRT ended with primary team by bedside for follow-up.

## 2021-10-27 NOTE — PROGRESS NOTE ADULT - ASSESSMENT
Assessment  DMT2: 71y Male with DM T2 with hyperglycemia, A1C 6.4%, was on insulin at home, now on low-dose basal insulin and coverage, blood sugars are trending within acceptable range with intermittent postprandial elevations, no hypoglycemias, not eating much, RD eval reviewed.  Hypothyroidism: Patient has no history thyroid disease, was not on any thyroid supplements, subclinical with low-normal FT4, lethargic, started on synthroid 25 mcg po daily.  CHF: on medications, stable, monitored.  HTN: Controlled,  on antihypertensive medications.  Parkinsons: on meds, monitored.  CKD: Monitor labs/BMP      Kelsie Reece MD  Cell: [phone]  Office: [phone]     Assessment  DMT2: 71y Male with DM T2 with hyperglycemia, A1C 6.4%, was on insulin at home, now on low-dose basal insulin and coverage, blood sugars are trending within acceptable range with intermittent postprandial elevations,  no hypoglycemias, not eating much, RD eval reviewed.  Hypothyroidism: Patient has no history thyroid disease, was not on any thyroid supplements, subclinical with low-normal FT4, lethargic, started on synthroid 25 mcg po daily.  CHF: on medications, stable, monitored.  HTN: Controlled,  on antihypertensive medications.  Parkinsons: on meds, monitored.  CKD: Monitor labs/BMP      Kelsie Reece MD  Cell: [phone]  Office: [phone]

## 2021-10-27 NOTE — CONSULT NOTE ADULT - ASSESSMENT
71 M w volume overload, GI consulted for drop in hgb    1. Volume overload per cardio    2. ABBY on CKD    3. Drop in hgb, no overt GI Bleed. CT showed RP bleed. F/u rpt CT to assess progress of CT.    .  Advanced care planning forms were discussed. Code status including forceful chest compressions, defibrillation and intubation were discussed. The risks benefits and alternatives to pertinent gastrointestinal procedures and interventions were discussed in detail and all questions were answered. Duration: 15 Minutes.      Segun León M.D.   Gastroenterology and Hepatology  [address] [place], [place]  Office: [phone]  Cell: [phone]

## 2021-10-27 NOTE — PROGRESS NOTE ADULT - ASSESSMENT
72yo M w/ PMHx of CAD (s/p CABG 2019), CKD (unknown stage), DM2, Parkinson's Disease, HTN, depression presents with new onset acute heart failure exacerbation     # Acute heart failure  # NSTEMI  # ABBY on CKD  # Atrial flutter  # acute hypoxic resp failure  TTE mod to severe LV SD, hypokinesis anterior wall  sp shiley placement for HD, HD per renal  cards cs appreciated, likely will need cath at a later point once creatinine improves  hep gtt held 2/2 anemia  supplemental O2    # anemia 2/2 chronic dz  # ro GIB, RP bleed  worsening, transfuse <7  hep gtt held 2/2 anemia  check stool occult  check CTA abd ro RP bleed  vascular consult  GI consult  MICU consult if patient hemodynamically unstable    # DM2 (diabetes mellitus, type 2)  insulin sliding scale  hba1c 6.4  hypoglycemic episodes, endo following    # CAD (coronary artery disease)  c/w atorvastatin, asa    # Parkinsons disease   c/w carbidopa/levodopa  c/w trihexyphenidyl    PCP Dr. Simons    dw son at bedside    goals of care  HCP roby Quintero [phone]  full code   72yo M w/ PMHx of CAD (s/p CABG 2019), CKD (unknown stage), DM2, Parkinson's Disease, HTN, depression presents with new onset acute heart failure exacerbation     # Acute systolic and diastolic heart failure  # NSTEMI  # ABBY on CKD  # Atrial flutter  # acute hypoxic resp failure  TTE mod to severe LV SD, hypokinesis anterior wall  sp shiley placement for HD, HD per renal  cards cs appreciated, likely will need cath at a later point once creatinine improves  hep gtt held 2/2 anemia  supplemental O2, ground glass opacity on imaging, pulm consult    # anemia 2/2 chronic dz  # ro GIB, RP bleed  worsening, transfuse <7  hep gtt held 2/2 anemia  check stool occult  check CTA abd ro RP bleed  vascular consult  GI consult  MICU consult if patient hemodynamically unstable    # DM2 (diabetes mellitus, type 2)  insulin sliding scale  hba1c 6.4  hypoglycemic episodes, endo following    # CAD (coronary artery disease)  c/w atorvastatin, asa    # Parkinsons disease   c/w carbidopa/levodopa  c/w trihexyphenidyl    PCP Dr. Ronak cano son at bedside    goals of care discussion 15 min  HCP roby Quintero [phone]  full code

## 2021-10-27 NOTE — CHART NOTE - NSCHARTNOTEFT_GEN_A_CORE
Nutrition Services:    Consult received for STAR CHF education - chart reviewed, events noted, see full follow up note 10/27 for full details. Pt declined nutrition education/materials at this time 2/2 reported pain at visit. Education inappropriate at this time - RD remains available to re-assess and provide education in future as feasible. May re-consult as needed.    RD remains available.   Chanel Nava, MS, RD, CDN #[phone]

## 2021-10-27 NOTE — PROGRESS NOTE ADULT - PROBLEM SELECTOR PLAN 2
Will continue synthroid 25 mcg po daily. Will continue monitoring and FU.  Suggest to FU outpatient in 4-6 weeks to repeat TFTs.  Discussed plan with patient.

## 2021-10-28 NOTE — CHART NOTE - NSCHARTNOTEFT_GEN_A_CORE
Follow up requested.  Pt off the floor at IR and then now can not be moved/ turned.  D/w team will return tomorrow to reevaluate.

## 2021-10-28 NOTE — PROGRESS NOTE ADULT - SUBJECTIVE AND OBJECTIVE BOX
INTERVAL HPI/OVERNIGHT EVENTS:  - Received 4-5 u yesterday. DDAVP given  - Did not require pressors/drips overnight  - Taken to IR in AM for abdominal angiography and possible embolization    SUBJECTIVE: Patient seen and examined at bedside.     OBJECTIVE:    VITAL SIGNS:  ICU Vital Signs Last 24 Hrs  T(C): 34 (28 Oct 2021 09:03), Max: 36.7 (27 Oct 2021 18:00)  T(F): 93.2 (28 Oct 2021 09:03), Max: 98 (27 Oct 2021 18:00)  HR: 115 (28 Oct 2021 09:03) (61 - 118)  BP: 125/69 (28 Oct 2021 09:03) (102/69 - 167/69)  BP(mean): 89 (28 Oct 2021 09:00) (70 - 116)  ABP: --  ABP(mean): --  RR: 16 (28 Oct 2021 09:03) (16 - 30)  SpO2: 99% (28 Oct 2021 09:03) (90% - 100%)        10-27 @ 07:01  -  10-28 @ 07:00  --------------------------------------------------------  IN: 50 mL / OUT: 550 mL / NET: -500 mL    10-28 @ 07:01  -  10-28 @ 11:15  --------------------------------------------------------  IN: 0 mL / OUT: 30 mL / NET: -30 mL      CAPILLARY BLOOD GLUCOSE      POCT Blood Glucose.: 212 mg/dL (28 Oct 2021 05:34)        PHYSICAL EXAM:  GENERAL: NAD, awake and oriented  HEAD:  Atraumatic, Normocephalic  CHEST/LUNG: CTABL, No wheezes B/L  HEART: RRR  ABDOMEN: Soft, Nontender, Nondistended; Bowel sounds present  EXTREMITIES:  2+ Peripheral Pulses, No clubbing, cyanosis, or edema  NEURO: No focal deficits      MEDICATIONS:  MEDICATIONS  (STANDING):  albuterol/ipratropium for Nebulization 3 milliLiter(s) Nebulizer every 6 hours  ascorbic acid 500 milliGRAM(s) Oral daily  atorvastatin 80 milliGRAM(s) Oral at bedtime  calcium acetate 1334 milliGRAM(s) Oral three times a day with meals  carbidopa/levodopa  25/100 2.5 Tablet(s) Oral <User Schedule>  carbidopa/levodopa  25/100 2 Tablet(s) Oral <User Schedule>  chlorhexidine 2% Cloths 1 Application(s) Topical daily  chlorhexidine 4% Liquid 1 Application(s) Topical <User Schedule>  cholecalciferol 1000 Unit(s) Oral daily  dextrose 40% Gel 15 Gram(s) Oral once  dextrose 5%. 1000 milliLiter(s) (50 mL/Hr) IV Continuous <Continuous>  dextrose 5%. 1000 milliLiter(s) (100 mL/Hr) IV Continuous <Continuous>  dextrose 50% Injectable 25 Gram(s) IV Push once  dextrose 50% Injectable 12.5 Gram(s) IV Push once  dextrose 50% Injectable 25 Gram(s) IV Push once  epoetin mari-epbx (RETACRIT) Injectable 19698 Unit(s) IV Push once  folic acid 1 milliGRAM(s) Oral daily  glucagon  Injectable 1 milliGRAM(s) IntraMuscular once  insulin glargine Injectable (LANTUS) 6 Unit(s) SubCutaneous at bedtime  insulin lispro (ADMELOG) corrective regimen sliding scale   SubCutaneous every 6 hours  insulin lispro (ADMELOG) corrective regimen sliding scale   SubCutaneous at bedtime  levothyroxine 25 MICROGram(s) Oral daily  metoprolol tartrate 25 milliGRAM(s) Oral two times a day  mupirocin 2% Ointment 1 Application(s) Both Nostrils two times a day  Nephro-celeste 1 Tablet(s) Oral daily  pantoprazole  Injectable 40 milliGRAM(s) IV Push daily  trihexyphenidyl 2 milliGRAM(s) Oral three times a day    MEDICATIONS  (PRN):  acetaminophen     Tablet .. 650 milliGRAM(s) Oral every 6 hours PRN Mild Pain (1 - 3)  guaiFENesin Oral Liquid (Sugar-Free) 200 milliGRAM(s) Oral every 6 hours PRN Cough  oxyCODONE    IR 5 milliGRAM(s) Oral every 4 hours PRN Severe Pain (7 - 10)  sodium chloride 0.9% lock flush 10 milliLiter(s) IV Push every 1 hour PRN Pre/post blood products, medications, blood draw, and to maintain line patency      ALLERGIES:  Allergies    adhesives (Rash)  latex (Urticaria)  No Known Drug Allergies    Intolerances        LABS:                        6.6    19.00 )-----------( 229      ( 28 Oct 2021 06:11 )             19.4     10-27    134<L>  |  99  |  100<H>  ----------------------------<  194<H>  4.8   |  18<L>  |  6.00<H>    Ca    9.6      27 Oct 2021 22:27  Phos  6.5     10-27  Mg     2.3     10-27    TPro  6.2  /  Alb  2.6<L>  /  TBili  0.6  /  DBili  x   /  AST  14  /  ALT  <5<L>  /  AlkPhos  65  10-27    PT/INR - ( 27 Oct 2021 22:27 )   PT: 12.9 sec;   INR: 1.08 ratio         PTT - ( 27 Oct 2021 22:27 )  PTT:60.6 sec      RADIOLOGY & ADDITIONAL TESTS: Reviewed.

## 2021-10-28 NOTE — PROGRESS NOTE ADULT - SUBJECTIVE AND OBJECTIVE BOX
SUBJECTIVE / OVERNIGHT EVENTS:    s/p RRT for AMS  s/p 3 units of PRBC yesterday  Tx to MICU  --------------------------------------------------------------------------------------------  LABS:                        6.6    19.00 )-----------( 229      ( 28 Oct 2021 06:11 )             19.4     10-27    134<L>  |  99  |  100<H>  ----------------------------<  194<H>  4.8   |  18<L>  |  6.00<H>    Ca    9.6      27 Oct 2021 22:27  Phos  6.5     10-27  Mg     2.3     10-27    TPro  6.2  /  Alb  2.6<L>  /  TBili  0.6  /  DBili  x   /  AST  14  /  ALT  <5<L>  /  AlkPhos  65  10-27    PT/INR - ( 27 Oct 2021 22:27 )   PT: 12.9 sec;   INR: 1.08 ratio         PTT - ( 27 Oct 2021 22:27 )  PTT:60.6 sec  CAPILLARY BLOOD GLUCOSE      POCT Blood Glucose.: 167 mg/dL (28 Oct 2021 12:43)  POCT Blood Glucose.: 212 mg/dL (28 Oct 2021 05:34)  POCT Blood Glucose.: 202 mg/dL (27 Oct 2021 16:38)            RADIOLOGY & ADDITIONAL TESTS:    Imaging Personally Reviewed:  [x] YES  [ ] NO    Consultant(s) Notes Reviewed:  [x] YES  [ ] NO    MEDICATIONS  (STANDING):  albuterol/ipratropium for Nebulization 3 milliLiter(s) Nebulizer every 6 hours  ascorbic acid 500 milliGRAM(s) Oral daily  atorvastatin 80 milliGRAM(s) Oral at bedtime  calcium acetate 1334 milliGRAM(s) Oral three times a day with meals  carbidopa/levodopa  25/100 2.5 Tablet(s) Oral <User Schedule>  carbidopa/levodopa  25/100 2 Tablet(s) Oral <User Schedule>  chlorhexidine 2% Cloths 1 Application(s) Topical daily  chlorhexidine 4% Liquid 1 Application(s) Topical <User Schedule>  cholecalciferol 1000 Unit(s) Oral daily  dexMEDEtomidine Infusion 0.2 MICROgram(s)/kG/Hr (4.82 mL/Hr) IV Continuous <Continuous>  dextrose 40% Gel 15 Gram(s) Oral once  dextrose 5%. 1000 milliLiter(s) (50 mL/Hr) IV Continuous <Continuous>  dextrose 5%. 1000 milliLiter(s) (100 mL/Hr) IV Continuous <Continuous>  dextrose 50% Injectable 25 Gram(s) IV Push once  dextrose 50% Injectable 12.5 Gram(s) IV Push once  dextrose 50% Injectable 25 Gram(s) IV Push once  epoetin mari-epbx (RETACRIT) Injectable 34737 Unit(s) IV Push once  folic acid 1 milliGRAM(s) Oral daily  glucagon  Injectable 1 milliGRAM(s) IntraMuscular once  insulin glargine Injectable (LANTUS) 6 Unit(s) SubCutaneous at bedtime  insulin lispro (ADMELOG) corrective regimen sliding scale   SubCutaneous every 6 hours  insulin lispro (ADMELOG) corrective regimen sliding scale   SubCutaneous at bedtime  levothyroxine 25 MICROGram(s) Oral daily  metoprolol tartrate 25 milliGRAM(s) Oral two times a day  mupirocin 2% Ointment 1 Application(s) Both Nostrils two times a day  Nephro-celeste 1 Tablet(s) Oral daily  pantoprazole  Injectable 40 milliGRAM(s) IV Push daily  trihexyphenidyl 2 milliGRAM(s) Oral three times a day    MEDICATIONS  (PRN):  acetaminophen     Tablet .. 650 milliGRAM(s) Oral every 6 hours PRN Mild Pain (1 - 3)  guaiFENesin Oral Liquid (Sugar-Free) 200 milliGRAM(s) Oral every 6 hours PRN Cough  oxyCODONE    IR 5 milliGRAM(s) Oral every 4 hours PRN Severe Pain (7 - 10)  sodium chloride 0.9% lock flush 10 milliLiter(s) IV Push every 1 hour PRN Pre/post blood products, medications, blood draw, and to maintain line patency      Care Discussed with Consultants/Other Providers [x] YES  [ ] NO    Vital Signs Last 24 Hrs  T(C): 35 (28 Oct 2021 12:30), Max: 36.7 (27 Oct 2021 18:00)  T(F): 95 (28 Oct 2021 12:30), Max: 98 (27 Oct 2021 18:00)  HR: 112 (28 Oct 2021 13:00) (61 - 118)  BP: 122/68 (28 Oct 2021 13:00) (102/69 - 184/93)  BP(mean): 88 (28 Oct 2021 13:00) (70 - 130)  RR: 20 (28 Oct 2021 13:00) (16 - 32)  SpO2: 96% (28 Oct 2021 13:00) (90% - 100%)  I&O's Summary    27 Oct 2021 07:01  -  28 Oct 2021 07:00  --------------------------------------------------------  IN: 50 mL / OUT: 550 mL / NET: -500 mL    28 Oct 2021 07:01  -  28 Oct 2021 13:23  --------------------------------------------------------  IN: 4.8 mL / OUT: 50 mL / NET: -45.2 mL

## 2021-10-28 NOTE — PROGRESS NOTE ADULT - ASSESSMENT
70yo M w/ PMH of CAD (s/p CABG 2019), CKD (unknown stage), DM2, Parkinson's Disease, HTN, depression p/w b/l LE edema found to have ARF and NSTEMI w/ new Aflutter started on heparin gtt and HD. Course c/b RP bleed w/ anemia, admitted to the MICU for closer monitoring while on HD.    # Neuro:  - A&Ox1  - Likely i/s/o sedation administered prior to CT  - Would continue to monitor    // Parkinson's Dz  - c/w Carbidopa/Levodopa  - c/w Trihexyphenidyl    # CV:  // CAD/ NSTEMI  - TTE mod to severe LVS dysfxn, hypokinesis anterior wall  - Holding heparin gtt and ASA given increasing RP bleed  - c/w Atorvastatin  - Unstable for cath at this moment    // Aflutter  - c/w Lopressor 25mg BID  - Monitor for hypotension given RP bleed    # Pulm:  - On 4L NC  - Wean as tolerated    # GI:  - No active issues  - Diet: Renal  - Fluid Restrict to 1.5L    # Renal:  // ABBY on CKD  - s/p shiley placement w/ IR  - Planned for HD in the MICU tonight   - c/w Phoslo 1337 tid    # Endo  // DM2  - A1c 6.4%  - c/w Lantus 6U qHS  - c/w ISS    // Hypothyroid  - c/w Synthroid 25mcg QD    # ID:  - Pt without strong objective or clinical evidence of infection. Will observe off antibiotics    # Heme/Onc:  - Holding AC i/s/o RP bleed    # Skin:  // No active issues    # Ethics   - FULL CODE  72yo M w/ PMH of CAD (s/p CABG 2019), CKD (unknown stage), DM2, Parkinson's Disease, HTN, depression p/w b/l LE edema found to have ARF and NSTEMI w/ new Aflutter started on heparin gtt and HD. Course c/b RP bleed w/ anemia, admitted to the MICU for closer monitoring while on HD.    # Neuro:  - A&Ox3  - Continue to monoitor    // Parkinson's Dz  - c/w Carbidopa/Levodopa  - c/w Trihexyphenidyl    # CV:  // CAD/ NSTEMI  - TTE mod to severe LVS dysfxn, hypokinesis anterior wall  - Holding heparin gtt and ASA given increasing RP bleed  - c/w Atorvastatin  - Unstable for cath at this moment    // Aflutter  - c/w Lopressor 25mg BID  - Monitor for hypotension given RP bleed    # Pulm:  - On 4L NC  - Wean as tolerated    # GI:  - No active issues  - Diet: Renal  - Fluid Restrict to 1.5L    # Renal:  // ABBY on CKD  - s/p shiley placement w/ IR  - c/w Phoslo 1337 tid  - Nephro consulted, recommended HD in AM. Pending patient return from IR    # Endo  // DM2  - A1c 6.4%  - c/w Lantus 6U qHS  - c/w ISS    // Hypothyroid  - c/w Synthroid 25mcg QD    # ID:  - Pt without strong objective or clinical evidence of infection. Will observe off antibiotics    # Heme/Onc:  - Holding AC i/s/o RP bleed    # Skin:  // No active issues    # Ethics   - FULL CODE

## 2021-10-28 NOTE — PROGRESS NOTE ADULT - SUBJECTIVE AND OBJECTIVE BOX
[facility] [phone]        Patient seen and examined in bed.  He was the same   Going to IR for embolization         MEDICATIONS  (STANDING):  albuterol/ipratropium for Nebulization 3 milliLiter(s) Nebulizer every 6 hours  ascorbic acid 500 milliGRAM(s) Oral daily  atorvastatin 80 milliGRAM(s) Oral at bedtime  calcium acetate 1334 milliGRAM(s) Oral three times a day with meals  carbidopa/levodopa  25/100 2.5 Tablet(s) Oral <User Schedule>  carbidopa/levodopa  25/100 2 Tablet(s) Oral <User Schedule>  chlorhexidine 2% Cloths 1 Application(s) Topical daily  chlorhexidine 4% Liquid 1 Application(s) Topical <User Schedule>  cholecalciferol 1000 Unit(s) Oral daily  dextrose 40% Gel 15 Gram(s) Oral once  dextrose 5%. 1000 milliLiter(s) (50 mL/Hr) IV Continuous <Continuous>  dextrose 5%. 1000 milliLiter(s) (100 mL/Hr) IV Continuous <Continuous>  dextrose 50% Injectable 25 Gram(s) IV Push once  dextrose 50% Injectable 12.5 Gram(s) IV Push once  dextrose 50% Injectable 25 Gram(s) IV Push once  epoetin mari-epbx (RETACRIT) Injectable 22483 Unit(s) IV Push once  folic acid 1 milliGRAM(s) Oral daily  glucagon  Injectable 1 milliGRAM(s) IntraMuscular once  insulin glargine Injectable (LANTUS) 6 Unit(s) SubCutaneous at bedtime  insulin lispro (ADMELOG) corrective regimen sliding scale   SubCutaneous every 6 hours  insulin lispro (ADMELOG) corrective regimen sliding scale   SubCutaneous at bedtime  levothyroxine 25 MICROGram(s) Oral daily  metoprolol tartrate 25 milliGRAM(s) Oral two times a day  mupirocin 2% Ointment 1 Application(s) Both Nostrils two times a day  Nephro-celeste 1 Tablet(s) Oral daily  pantoprazole  Injectable 40 milliGRAM(s) IV Push daily  trihexyphenidyl 2 milliGRAM(s) Oral three times a day      VITAL:  T(C): , Max: 36.7 (10-27-21 @ 18:00)  T(F): , Max: 98 (10-27-21 @ 18:00)  HR: 115 (10-28-21 @ 09:03)  BP: 125/69 (10-28-21 @ 09:03)  BP(mean): 89 (10-28-21 @ 09:00)  RR: 16 (10-28-21 @ 09:03)  SpO2: 99% (10-28-21 @ 09:03)  Wt(kg): --    I and O's:    10-27 @ 07:01  -  10-28 @ 07:00  --------------------------------------------------------  IN: 50 mL / OUT: 550 mL / NET: -500 mL      Height (cm): 180.3 (10-27 @ 21:30)  Weight (kg): 96.4 (10-27 @ 21:30)  BMI (kg/m2): 29.7 (10-27 @ 21:30)  BSA (m2): 2.16 (10-27 @ 21:30)    PHYSICAL EXAM:    Constitutional: NAD  Neck:  No JVD  Respiratory: CTAB/L  Cardiovascular: S1 and S2  Gastrointestinal: BS+, soft, NT/ND  Extremities: No peripheral edema  Neurological: A/O x 3, no focal deficits  Psychiatric: Normal mood, normal affect  : No Javier  Skin: No rashes  Access: [facility]     LABS:                        6.6    19.00 )-----------( 229      ( 28 Oct 2021 06:11 )             19.4     10-27    134<L>  |  99  |  100<H>  ----------------------------<  194<H>  4.8   |  18<L>  |  6.00<H>    Ca    9.6      27 Oct 2021 22:27  Phos  6.5     10-27  Mg     2.3     10-27    TPro  6.2  /  Alb  2.6<L>  /  TBili  0.6  /  DBili  x   /  AST  14  /  ALT  <5<L>  /  AlkPhos  65  10-27          Urine Studies:          RADIOLOGY & ADDITIONAL STUDIES:

## 2021-10-28 NOTE — PROGRESS NOTE ADULT - PROBLEM SELECTOR PLAN 7
likely anemia of ckd, chr dz  check iron studies  no e/o active bleed, monitor while on AC  transfuse for hb<7 or symptoms    acute on chronic anemia, rt groin pain cannot r/o active intramuscular/RP bleed  CTA with active bleed  IR today for abdominal angiography/poss embolization - f/u

## 2021-10-28 NOTE — PROGRESS NOTE ADULT - ATTENDING COMMENTS
70yo M w/ PMHx of CAD (s/p CABG 2019), CKD (unknown stage), DM2, Parkinson's Disease, HTN, depression presents with new onset acute heart failure exacerbation, NSTEMI, started on hep gtt, now with bl RP bleed, acute anemia. transferred to MICU.    # Acute systolic and diastolic heart failure  # NSTEMI  # ABBY on CKD  # Atrial flutter  # acute hypoxic resp failure  # active bleeding left RP hematoma, anemia  TTE mod to severe LV SD, hypokinesis anterior wall  sp shiley placement for HD, HD per renal  cards cs appreciated, likely will need cath at a later point once creatinine improves  hep gtt held 2/2 anemia  10/27 active RP bleeding, transferred to MICU, sp multiple transfusions DDAVP  10/28 IR for abd angiography and embolization  appreciate ICU care    # DM2 (diabetes mellitus, type 2)  insulin sliding scale  hba1c 6.4    # CAD (coronary artery disease)  c/w atorvastatin  asa on hold    # Parkinsons disease   c/w carbidopa/levodopa  c/w trihexyphenidyl    PCP Dr. Simons    HCP son Yunier Quintero [phone]  full code

## 2021-10-28 NOTE — PROGRESS NOTE ADULT - PROBLEM SELECTOR PROBLEM 5
Alert-The patient is alert, awake and responds to voice. The patient is oriented to time, place, and person. The triage nurse is able to obtain subjective information. Parkinsons disease

## 2021-10-28 NOTE — PROGRESS NOTE ADULT - PROBLEM SELECTOR PLAN 1
Will continue current insulin regimen for now. Will continue monitoring FS, log, and FU.  Patient previously on larger dose insulin (Tresiba and Novolog), will continue monitoring and FU DC recommendations.

## 2021-10-28 NOTE — PROGRESS NOTE ADULT - ATTENDING COMMENTS
71 year old man with CAD (s/p CABG 2019), CKD, DM2, Parkinson's Disease, HTN, depression presented with LE edema found to have ARF and NSTEMI w/ new Aflutter started on heparin gtt and HD. Patient developed hypotension and anemia found to have RP bleed transferred to MICU for management of hypotension    - briefly needed vasopressors that have been weaned off  - IR for embolization today  - anticoagulation has been turned off  - planned for HD after IR today

## 2021-10-28 NOTE — PROGRESS NOTE ADULT - PROBLEM SELECTOR PLAN 6
likely worsening CKD  renal cs appreciated  i/o, volume overloaded  chf mgmt as above  sp bridget placed for 1st HD today

## 2021-10-28 NOTE — PROGRESS NOTE ADULT - PROBLEM SELECTOR PLAN 1
-last echo 2019 showed normal EF with mild diastolic dysfunction  -elevated BNP with lower extremity edema  -will require high doses of lasix secondary to significant renal disease (no clear CKD diagnosis but had elevated Cr in 2019, likely acute on chronic renal failure now secondary to cardiorenal syndrome)   lasix 60mg IV BID uptitrate as needed, monitor blood pressure  TTE fu  -strict I/O  -fluid restriction    #NSTEMI-troponin s peaked  cards cs appreciated  likely will need cath at a later point once creatinine improves

## 2021-10-28 NOTE — PROGRESS NOTE ADULT - ASSESSMENT
70yo M w/ PMHx of CAD (s/p CABG 2019), CKD (unknown stage), DM2, Parkinson's Disease, HTN, depression presents with bilateral leg swelling  Mild CHF  Acute on chronic renal failure --getting worse   Hyperphosphatemia  Abd pain   Severe LV dysfucntion     1 IR- S perm cath in 1-2 days      2 Renal-   DC IV lasix   Second HD today.  Was not done yesterday dur to events of RP bleed     Phoslo 1337 tid    3 CVS-Cards f/u noted;  On Lopressor  bid    4 GI-Blood today;  Retacrit at HD     DW MICU     Sayed [facility]   [facility]   [phone]

## 2021-10-28 NOTE — PROGRESS NOTE ADULT - ASSESSMENT
Assessment  DMT2: 71y Male with DM T2 with hyperglycemia, A1C 6.4%, was on insulin at home, now on low-dose basal insulin and coverage, blood sugars are fluctuating within overall acceptable range, no hypoglycemias. Patient is s/p RRT for AMS, transferred to micu, appears comfortable, NPO.  Hypothyroidism: Patient has no history thyroid disease, was not on any thyroid supplements, subclinical with low-normal FT4, lethargic, started on synthroid 25 mcg po daily.  CHF: on medications, stable, monitored.  HTN: Controlled,  on antihypertensive medications.  Parkinsons: on meds, monitored.  CKD: Monitor labs/BMP      Kelsie Reece MD  Cell: [phone]  Office: [phone]     Assessment  DMT2: 71y Male with DM T2 with hyperglycemia, A1C 6.4%, was on insulin at home, now on low-dose basal insulin and coverage, blood sugars are fluctuating within overall acceptable range, no hypoglycemias.  Patient is s/p RRT for AMS, transferred to micu, appears comfortable, NPO.  Hypothyroidism: Patient has no history thyroid disease, was not on any thyroid supplements, subclinical with low-normal FT4, lethargic, started on synthroid 25 mcg po daily.  CHF: on medications, stable, monitored.  HTN: Controlled,  on antihypertensive medications.  Parkinsons: on meds, monitored.  CKD: Monitor labs/BMP      Kelsie Reece MD  Cell: [phone]  Office: [phone]

## 2021-10-28 NOTE — PROGRESS NOTE ADULT - SUBJECTIVE AND OBJECTIVE BOX
Chief complaint  Patient is a 71y old  Male who presents with a chief complaint of leg swelling (28 Oct 2021 13:23)   Review of systems    Acute events noted, RRT yesterday for AMS, transferred to micu.  Patient awake in bed, looks comfortable, no hypoglycemic episodes.    Labs and Fingersticks  CAPILLARY BLOOD GLUCOSE      POCT Blood Glucose.: 167 mg/dL (28 Oct 2021 12:43)  POCT Blood Glucose.: 212 mg/dL (28 Oct 2021 05:34)  POCT Blood Glucose.: 202 mg/dL (27 Oct 2021 16:38)      Anion Gap, Serum: 17 (10-27 @ 22:27)  Anion Gap, Serum: 19 *H* (10-27 @ 16:58)  Anion Gap, Serum: 16 (10-27 @ 05:02)      Calcium, Total Serum: 9.6 (10-27 @ 22:27)  Calcium, Total Serum: 9.1 (10-27 @ 16:58)  Calcium, Total Serum: 9.1 (10-27 @ 05:02)  Albumin, Serum: 2.6 *L* (10-27 @ 22:27)  Albumin, Serum: 2.5 *L* (10-27 @ 16:58)  Albumin, Serum: 2.6 *L* (10-27 @ 05:02)    Alanine Aminotransferase (ALT/SGPT): <5 *L* (10-27 @ 22:27)  Alanine Aminotransferase (ALT/SGPT): 6 *L* (10-27 @ 16:58)  Alanine Aminotransferase (ALT/SGPT): <5 *L* (10-27 @ 05:02)  Alkaline Phosphatase, Serum: 65 (10-27 @ 22:27)  Alkaline Phosphatase, Serum: 64 (10-27 @ 16:58)  Alkaline Phosphatase, Serum: 66 (10-27 @ 05:02)  Aspartate Aminotransferase (AST/SGOT): 14 (10-27 @ 22:27)  Aspartate Aminotransferase (AST/SGOT): 12 (10-27 @ 16:58)  Aspartate Aminotransferase (AST/SGOT): 10 (10-27 @ 05:02)        10-27    134<L>  |  99  |  100<H>  ----------------------------<  194<H>  4.8   |  18<L>  |  6.00<H>    Ca    9.6      27 Oct 2021 22:27  Phos  6.5     10-27  Mg     2.3     10-27    TPro  6.2  /  Alb  2.6<L>  /  TBili  0.6  /  DBili  x   /  AST  14  /  ALT  <5<L>  /  AlkPhos  65  10-27                        6.6    19.00 )-----------( 229      ( 28 Oct 2021 06:11 )             19.4     Medications  MEDICATIONS  (STANDING):  albuterol/ipratropium for Nebulization 3 milliLiter(s) Nebulizer every 6 hours  ascorbic acid 500 milliGRAM(s) Oral daily  atorvastatin 80 milliGRAM(s) Oral at bedtime  calcium acetate 1334 milliGRAM(s) Oral three times a day with meals  carbidopa/levodopa  25/100 2.5 Tablet(s) Oral <User Schedule>  carbidopa/levodopa  25/100 2 Tablet(s) Oral <User Schedule>  chlorhexidine 2% Cloths 1 Application(s) Topical daily  chlorhexidine 4% Liquid 1 Application(s) Topical <User Schedule>  cholecalciferol 1000 Unit(s) Oral daily  dexMEDEtomidine Infusion 0.2 MICROgram(s)/kG/Hr (4.82 mL/Hr) IV Continuous <Continuous>  dextrose 40% Gel 15 Gram(s) Oral once  dextrose 5%. 1000 milliLiter(s) (50 mL/Hr) IV Continuous <Continuous>  dextrose 5%. 1000 milliLiter(s) (100 mL/Hr) IV Continuous <Continuous>  dextrose 50% Injectable 25 Gram(s) IV Push once  dextrose 50% Injectable 12.5 Gram(s) IV Push once  dextrose 50% Injectable 25 Gram(s) IV Push once  epoetin mari-epbx (RETACRIT) Injectable 81485 Unit(s) IV Push once  folic acid 1 milliGRAM(s) Oral daily  glucagon  Injectable 1 milliGRAM(s) IntraMuscular once  insulin glargine Injectable (LANTUS) 6 Unit(s) SubCutaneous at bedtime  insulin lispro (ADMELOG) corrective regimen sliding scale   SubCutaneous every 6 hours  insulin lispro (ADMELOG) corrective regimen sliding scale   SubCutaneous at bedtime  levothyroxine 25 MICROGram(s) Oral daily  metoprolol tartrate 25 milliGRAM(s) Oral two times a day  mupirocin 2% Ointment 1 Application(s) Both Nostrils two times a day  Nephro-celeste 1 Tablet(s) Oral daily  pantoprazole  Injectable 40 milliGRAM(s) IV Push daily  trihexyphenidyl 2 milliGRAM(s) Oral three times a day      Physical Exam  General: Patient comfortable in bed  Vital Signs Last 12 Hrs  T(F): 95 (10-28-21 @ 12:30), Max: 97.4 (10-28-21 @ 04:00)  HR: 74 (10-28-21 @ 14:00) (74 - 118)  BP: 92/50 (10-28-21 @ 14:00) (92/50 - 184/93)  BP(mean): 65 (10-28-21 @ 14:00) (65 - 130)  RR: 18 (10-28-21 @ 14:00) (16 - 32)  SpO2: 96% (10-28-21 @ 14:00) (90% - 100%)  Neck: No palpable thyroid nodules.    Diagnostics    Free Thyroxine, Serum: AM Sched. Collection: 26-Oct-2021 06:00 (10-25 @ 11:45)  Thyroid Stimulating Hormone, Serum: AM Sched. Collection: 26-Oct-2021 06:00 (10-25 @ 11:45)  Cortisol AM, Serum: 08:00 (10-22 @ 12:47)  Free Thyroxine, Serum: AM Sched. Collection: 23-Oct-2021 06:00 (10-22 @ 12:43)  Thyroid Stimulating Hormone, Serum: AM Sched. Collection: 23-Oct-2021 06:00 (10-22 @ 12:43)               Chief complaint  Patient is a 71y old  Male who presents with a chief complaint of leg swelling (28 Oct 2021 13:23)   Review of systems    Acute events noted, RRT yesterday for AMS, transferred to micu.  Patient awake in bed, looks comfortable, no hypoglycemic episodes.    Labs and Fingersticks  CAPILLARY BLOOD GLUCOSE      POCT Blood Glucose.: 167 mg/dL (28 Oct 2021 12:43)  POCT Blood Glucose.: 212 mg/dL (28 Oct 2021 05:34)  POCT Blood Glucose.: 202 mg/dL (27 Oct 2021 16:38)      Anion Gap, Serum: 17 (10-27 @ 22:27)  Anion Gap, Serum: 19 *H* (10-27 @ 16:58)  Anion Gap, Serum: 16 (10-27 @ 05:02)      Calcium, Total Serum: 9.6 (10-27 @ 22:27)  Calcium, Total Serum: 9.1 (10-27 @ 16:58)  Calcium, Total Serum: 9.1 (10-27 @ 05:02)  Albumin, Serum: 2.6 *L* (10-27 @ 22:27)  Albumin, Serum: 2.5 *L* (10-27 @ 16:58)  Albumin, Serum: 2.6 *L* (10-27 @ 05:02)    Alanine Aminotransferase (ALT/SGPT): <5 *L* (10-27 @ 22:27)  Alanine Aminotransferase (ALT/SGPT): 6 *L* (10-27 @ 16:58)  Alanine Aminotransferase (ALT/SGPT): <5 *L* (10-27 @ 05:02)  Alkaline Phosphatase, Serum: 65 (10-27 @ 22:27)  Alkaline Phosphatase, Serum: 64 (10-27 @ 16:58)  Alkaline Phosphatase, Serum: 66 (10-27 @ 05:02)  Aspartate Aminotransferase (AST/SGOT): 14 (10-27 @ 22:27)  Aspartate Aminotransferase (AST/SGOT): 12 (10-27 @ 16:58)  Aspartate Aminotransferase (AST/SGOT): 10 (10-27 @ 05:02)        10-27    134<L>  |  99  |  100<H>  ----------------------------<  194<H>  4.8   |  18<L>  |  6.00<H>    Ca    9.6      27 Oct 2021 22:27  Phos  6.5     10-27  Mg     2.3     10-27    TPro  6.2  /  Alb  2.6<L>  /  TBili  0.6  /  DBili  x   /  AST  14  /  ALT  <5<L>  /  AlkPhos  65  10-27                        6.6    19.00 )-----------( 229      ( 28 Oct 2021 06:11 )             19.4     Medications  MEDICATIONS  (STANDING):  albuterol/ipratropium for Nebulization 3 milliLiter(s) Nebulizer every 6 hours  ascorbic acid 500 milliGRAM(s) Oral daily  atorvastatin 80 milliGRAM(s) Oral at bedtime  calcium acetate 1334 milliGRAM(s) Oral three times a day with meals  carbidopa/levodopa  25/100 2.5 Tablet(s) Oral <User Schedule>  carbidopa/levodopa  25/100 2 Tablet(s) Oral <User Schedule>  chlorhexidine 2% Cloths 1 Application(s) Topical daily  chlorhexidine 4% Liquid 1 Application(s) Topical <User Schedule>  cholecalciferol 1000 Unit(s) Oral daily  dexMEDEtomidine Infusion 0.2 MICROgram(s)/kG/Hr (4.82 mL/Hr) IV Continuous <Continuous>  dextrose 40% Gel 15 Gram(s) Oral once  dextrose 5%. 1000 milliLiter(s) (50 mL/Hr) IV Continuous <Continuous>  dextrose 5%. 1000 milliLiter(s) (100 mL/Hr) IV Continuous <Continuous>  dextrose 50% Injectable 25 Gram(s) IV Push once  dextrose 50% Injectable 12.5 Gram(s) IV Push once  dextrose 50% Injectable 25 Gram(s) IV Push once  epoetin mari-epbx (RETACRIT) Injectable 22359 Unit(s) IV Push once  folic acid 1 milliGRAM(s) Oral daily  glucagon  Injectable 1 milliGRAM(s) IntraMuscular once  insulin glargine Injectable (LANTUS) 6 Unit(s) SubCutaneous at bedtime  insulin lispro (ADMELOG) corrective regimen sliding scale   SubCutaneous every 6 hours  insulin lispro (ADMELOG) corrective regimen sliding scale   SubCutaneous at bedtime  levothyroxine 25 MICROGram(s) Oral daily  metoprolol tartrate 25 milliGRAM(s) Oral two times a day  mupirocin 2% Ointment 1 Application(s) Both Nostrils two times a day  Nephro-celeste 1 Tablet(s) Oral daily  pantoprazole  Injectable 40 milliGRAM(s) IV Push daily  trihexyphenidyl 2 milliGRAM(s) Oral three times a day      Physical Exam  General: Patient comfortable in bed  Vital Signs Last 12 Hrs  T(F): 95 (10-28-21 @ 12:30), Max: 97.4 (10-28-21 @ 04:00)  HR: 74 (10-28-21 @ 14:00) (74 - 118)  BP: 92/50 (10-28-21 @ 14:00) (92/50 - 184/93)  BP(mean): 65 (10-28-21 @ 14:00) (65 - 130)  RR: 18 (10-28-21 @ 14:00) (16 - 32)  SpO2: 96% (10-28-21 @ 14:00) (90% - 100%)  Neck: No palpable thyroid nodules.    Diagnostics    Free Thyroxine, Serum: AM Sched. Collection: 26-Oct-2021 06:00 (10-25 @ 11:45)  Thyroid Stimulating Hormone, Serum: AM Sched. Collection: 26-Oct-2021 06:00 (10-25 @ 11:45)  Cortisol AM, Serum: 08:00 (10-22 @ 12:47)  Free Thyroxine, Serum: AM Sched. Collection: 23-Oct-2021 06:00 (10-22 @ 12:43)  Thyroid Stimulating Hormone, Serum: AM Sched. Collection: 23-Oct-2021 06:00 (10-22 @ 12:43)

## 2021-10-29 NOTE — PROGRESS NOTE ADULT - ASSESSMENT
72yo M w/ PMHx of CAD (s/p CABG 2019), CKD (unknown stage), DM2, Parkinson's Disease, HTN, depression presents with bilateral leg swelling  Mild CHF  Acute on chronic renal failure --getting worse   Hyperphosphatemia  Abd pain   Severe LV dysfucntion     1 IR-  perm cath early next week       2 Renal-   HD  in am      Phoslo 1337 tid    3 CVS-Cards f/u noted;  On Lopressor  bid    4 GI- Serial CBC at present and Retacrit at HD       Sayed [facility]   [phone]

## 2021-10-29 NOTE — PROGRESS NOTE ADULT - ATTENDING COMMENTS
Agree with above. Seen and examined with team on rounds. Critically ill requiring frequent bedside visits. Renal failure on HD x 2. AMS unclear etiology.

## 2021-10-29 NOTE — CONSULT NOTE ADULT - SUBJECTIVE AND OBJECTIVE BOX
PULMONARY CONSULT  Brayan Verma MD  [phone]    Initial HPI on admission:  HPI:  70yo M w/ PMHx of CAD (s/p CABG 2019), CKD (unknown stage), DM2, Parkinson's Disease, HTN, depression presents with bilateral leg swelling, patient's daughter in-law at bedside Yoly Hardin ([employer] Nurse) provides the history, the daughter reports the patient is a poor historian, unable to remember having conversations with others and likely cannot weigh risk/benefits of medical conditions, she reports that he has had bilateral lower extremity swelling for the past few months, but over the past 2 weeks it has significantly worsened, he has further difficulty ambulating due to swelling, his outpatient provider attempted to manage with 20mg lasix and then increased to 40mg lasix but the patient never took the increased dose, his symptoms ar persistent throughout the day and are extremely severe, he has developed wounds of the legs with weeping of fluid due to the swelling, she reports that the patient is frequently non-compliant with medications and medical management, he lives at home with his son and daughter in law, he denies chest pain, shortness of breath, fever/chills, cough, sputum, in the ED, he was tachycardic but hemodynamically stable, afebrile, saturating well on room air, labs were significant for elevated BNP, elevated creatinine, imaging showed moderate left pleural effusion, patient was admitted to general medicine for further management        PAST MEDICAL & SURGICAL HISTORY:  Hypertension    Diabetes Mellitus, Type 2    Hypercholesterolemia    Parkinson disease    CAD (coronary artery disease)  s/p 1 stent in 2010 and CABG 2019    S/P CABG (coronary artery bypass graft)  2019    S/P hip replacement, right  2016    Anxiety    Depression, major      Allergies    adhesives (Rash)  latex (Urticaria)  No Known Drug Allergies    Intolerances      FAMILY HISTORY:  Family history of hypertension    Family history of malaria    Family history of pulmonary fibrosis (Sibling)     Social History:  Social History (marital status, living situation, occupation, tobacco use, alcohol and drug use, and sexual history): denies current alcohol, recreational drug or tobacco use, lives with son and daughter-in-law at home     Tobacco Screening:  · Core Measure Site	No    Risk Assessment:    Present on Admission:  Deep Venous Thrombosis	no  Pulmonary Embolus	no       Review of Systems:  · Negative General Symptoms	no fever; no chills  · Skin/Breast Comments	skin breakdown in lower extremities ,erythema, swelling  · Negative Ophthalmologic Symptoms	no blurred vision L; no blurred vision R; no loss of vision L; no loss of vision R  · Negative ENMT Symptoms	no nasal congestion; no throat pain  · Negative Respiratory and Thorax Symptoms	no wheezing; no dyspnea; no cough  · Negative Cardiovascular Symptoms	no chest pain; no palpitations  · Negative Gastrointestinal Symptoms	no nausea; no vomiting  · General Genitourinary Symptoms	incontinence  · Musculoskeletal Symptoms	joint swelling; joint pain; myalgia; muscle cramps  · Negative Neurological Symptoms	no loss of consciousness  · Neurological Symptoms	weakness; confusion  · Negative Psychiatric Symptoms	no suicidal ideation  · Psychiatric Symptoms	depression; anxiety  · Negative Endocrine Symptoms	no cold intolerance; no heat intolerance  · Negative Allergic Reactions	no anaphylaxis; no photosensitivity    Goals of Care:    GOALS OF CARE:  · Participants	Family  · Child(mohini)	Daughter-in-law Yoly Hardin    Allergies and Intolerances:        Allergies:  	No Known Drug Allergies:   	latex: Latex, Urticaria  	adhesives: Miscellaneous, Rash      Medications:  MEDICATIONS  (STANDING):  albuterol/ipratropium for Nebulization 3 milliLiter(s) Nebulizer every 6 hours  ascorbic acid 500 milliGRAM(s) Oral daily  atorvastatin 80 milliGRAM(s) Oral at bedtime  calcium acetate 1334 milliGRAM(s) Oral three times a day with meals  chlorhexidine 2% Cloths 1 Application(s) Topical daily  chlorhexidine 4% Liquid 1 Application(s) Topical <User Schedule>  cholecalciferol 1000 Unit(s) Oral daily  dextrose 40% Gel 15 Gram(s) Oral once  dextrose 5%. 1000 milliLiter(s) (50 mL/Hr) IV Continuous <Continuous>  dextrose 5%. 1000 milliLiter(s) (100 mL/Hr) IV Continuous <Continuous>  dextrose 50% Injectable 25 Gram(s) IV Push once  dextrose 50% Injectable 12.5 Gram(s) IV Push once  dextrose 50% Injectable 25 Gram(s) IV Push once  folic acid 1 milliGRAM(s) Oral daily  glucagon  Injectable 1 milliGRAM(s) IntraMuscular once  insulin glargine Injectable (LANTUS) 6 Unit(s) SubCutaneous at bedtime  insulin lispro (ADMELOG) corrective regimen sliding scale   SubCutaneous every 6 hours  levothyroxine 25 MICROGram(s) Oral daily  metoprolol tartrate 25 milliGRAM(s) Oral two times a day  midodrine 10 milliGRAM(s) Oral every 8 hours  mupirocin 2% Ointment 1 Application(s) Both Nostrils two times a day  Nephro-celeste 1 Tablet(s) Oral daily  norepinephrine Infusion 0.05 MICROgram(s)/kG/Min (9.04 mL/Hr) IV Continuous <Continuous>  pantoprazole  Injectable 40 milliGRAM(s) IV Push daily  piperacillin/tazobactam IVPB.. 3.375 Gram(s) IV Intermittent every 12 hours  polyethylene glycol 3350 17 Gram(s) Oral daily  senna 2 Tablet(s) Oral at bedtime  trihexyphenidyl 2 milliGRAM(s) Oral three times a day    MEDICATIONS  (PRN):  acetaminophen     Tablet .. 650 milliGRAM(s) Oral every 6 hours PRN Mild Pain (1 - 3)  guaiFENesin Oral Liquid (Sugar-Free) 200 milliGRAM(s) Oral every 6 hours PRN Cough  oxyCODONE    IR 5 milliGRAM(s) Oral every 4 hours PRN Severe Pain (7 - 10)  sodium chloride 0.9% lock flush 10 milliLiter(s) IV Push every 1 hour PRN Pre/post blood products, medications, blood draw, and to maintain line patency    Vital Signs Last 24 Hrs  T(C): 36.7 (29 Oct 2021 12:00), Max: 36.7 (29 Oct 2021 12:00)  T(F): 98 (29 Oct 2021 12:00), Max: 98 (29 Oct 2021 12:00)  HR: 112 (29 Oct 2021 13:30) (55 - 150)  BP: 127/83 (29 Oct 2021 13:30) (72/50 - 215/96)  BP(mean): 100 (29 Oct 2021 13:30) (53 - 138)  RR: 22 (29 Oct 2021 13:30) (12 - 35)  SpO2: 98% (29 Oct 2021 13:30) (72% - 100%)    ABG - ( 27 Oct 2021 22:16 )  pH, Arterial: 7.37  pH, Blood: x     /  pCO2: 37    /  pO2: 126   / HCO3: 21    / Base Excess: -3.5  /  SaO2: 99.3      10-28 @ 07:01  -  10-29 @ 07:00  --------------------------------------------------------  IN: 1378.4 mL / OUT: 625 mL / NET: 753.4 mL    LABS:                        7.7    21.96 )-----------( 123      ( 29 Oct 2021 06:13 )             23.4     10-28    132<L>  |  96  |  66<H>  ----------------------------<  146<H>  4.6   |  20<L>  |  3.81<H>    Ca    9.1      28 Oct 2021 23:23  Phos  5.6     10-28  Mg     2.1     10-28    TPro  6.1  /  Alb  2.5<L>  /  TBili  0.8  /  DBili  x   /  AST  21  /  ALT  <5<L>  /  AlkPhos  60  10-28      PT/INR - ( 28 Oct 2021 23:23 )   PT: 12.0 sec;   INR: 1.00 ratio         PTT - ( 28 Oct 2021 23:23 )  PTT:43.7 sec      Physical Examination:    General: Non toxic, No acute distress.      HEENT: Pupils equal, reactive to light.  Symmetric.    PULM: Diminished L BS    CVS: Regular rate and rhythm, no murmurs, rubs, or gallops    ABD: Soft, nondistended, nontender, normoactive bowel sounds, no masses    EXT: 3+ edema/anasarca    SKIN: Warm and well perfused, no rashes noted.    NEURO: Alert, oriented, interactive, nonfocal    RADIOLOGY REVIEWED PERSONALLY  CXR:    EXAM:  XR CHEST PORTABLE URGENT 1V                            PROCEDURE DATE:  10/21/2021      INTERPRETATION:  CLINICAL INFORMATION: Lower extremity swelling.    TECHNIQUE: Frontal radiograph of the chest    COMPARISON: 10/13/2019.    FINDINGS:    Heart size and the mediastinum cannot be accurately evaluated on this projection. Median sternotomy sutures and surgical clips again seen.    Mild right basilar subsegmental atelectasis.  There is mild asymmetric pulmonary vascular congestion on the left.  Left lower and retrocardiac opacity with obscuration of left hemidiaphragm appears slightly smaller.  No right pleural effusion.  No pneumothorax.  There is osteoarthritic degenerative change of the spine.      IMPRESSION:    Mild right basilar subsegmental atelectasis.    Mild asymmetric pulmonary vascular congestion on the left.    Left lower and retrocardiac opacity which could be due to a moderate size left pleural effusion with associated passive atelectasis, appears slightly smaller. Atelectasis of other cause and/or other underlying pathology is not excluded.    CT Abd:    EXAM:  CT ABDOMEN AND PELVIS IC                            PROCEDURE DATE:  10/27/2021      INTERPRETATION:  CLINICAL INFORMATION: RP bleed, pain, anemia, rule out active bleed.    COMPARISON: CT abdomen and pelvis 10/26/2021    CONTRAST/COMPLICATIONS:  IV Contrast: Omnipaque 350  90 cc administered   10 cc discarded  Oral Contrast: None  Complications: None reported at time of study completion    PROCEDURE:  CT of the Abdomen and Pelvis was performed.  Precontrast, Arterial and Delayed phases were performed.  Sagittal and coronal reformats were performed.    FINDINGS:  Study limited by delayed arterial phase.    LOWER CHEST: Cardiomegaly. Status post sternotomy and CABG. Aortic valve and coronary artery calcifications. Bibasilar pulmonary opacities, unchanged. Moderate left and small right pleural effusions with adjacent passive atelectasis, unchanged    LIVER: Within normal limits.  BILE DUCTS: Normal caliber.  GALLBLADDER: Within normal limits.  SPLEEN: Within normal limits.  PANCREAS: A 1.2 cm cystic lesion in the pancreatic neck (6:52). No main duct dilatation.  ADRENALS: Within normal limits.  KIDNEYS/URETERS: Bilateral cysts. Left kidney is displaced anteriorly by the large left retroperitoneal hematoma which is inseparable from the mid to distal ureter resulting in mild left-sided hydronephrosis.    BLADDER: Mildly distended and anterior right laterally displaced by the retroperitoneal hematoma.  REPRODUCTIVE ORGANS: Prostatomegaly measuring 5.7 cm in transverse dimension.    BOWEL: Extensive colonic diverticulosis, concentrated in the sigmoid, without acute diverticulitis. No bowel wall thickening or obstruction. Appendix is normal.  PERITONEUM: New small ascites, primarily perihepatic.  VESSELS: Atherosclerotic changes.  RETROPERITONEUM/LYMPH NODES: Redemonstrated bilateral retroperitoneal hematomas, asymmetric to the left. Evaluation for active bleeding is somewhat limited due to delayed arterial phase. However, marked interval increase in size of the left sided hematoma which contains a subtle blush of linear contrast on arterial phase imaging (6:17 and 11:42), is suspicious for active bleed. Left-sided hematoma now measures 8.2 x 10.8 x 18.3 cm (anterior-posterior by transverse by craniocaudad) andnow extends into the left hemipelvis, previously 6.4 x 3.3 x 7.4 cm. Right-sided hematoma which also involves the iliacus muscle, is not significantly changed since prior.    ABDOMINAL WALL: A small fat-containing left inguinal hernia, also likely containing a small amount of hemorrhage. A tiny fat-containing umbilical hernia.  BONES: Degenerative changes. Right hip arthroplasty.    IMPRESSION:  Study limited by delayed arterial phase.    Interval increase in size of a now large left retroperitoneal hematoma measuring up to 18.3 cm in craniocaudal dimension, previously 7.4 cm, with suspicion for contrast extravasation on arterial phase imaging, suspicious for active bleed. Essentially unchanged small right retroperitoneal hematoma.    Left kidney is now anteriorly displaced by the large left retroperitoneal hematoma, which also contacts the mid to distal ureter which results in new mild left sided hydroureteronephrosis.    Unchanged moderate left and small right pleural effusions and unchanged pulmonary opacities at the lung bases.    A 1.2 cm cystic lesion in the pancreatic neck. Consider further evaluation on a nonemergent outpatient basis with MRI/MRCP.    New small ascites.    Findings regarding the left retroperitoneal hemorrhagewere discussed by Abbey DAMON with Gilberto NP on 10/27/2021 at 5:56 PM.    TTE:    Patient name: ORLIN HARDIN  YOB: 1950   Age: 71 (M)   MR#: [id number]  Study Date: 10/21/2021  [facility]  Study quality: Technically difficult  Referring Physician: Marika Pineda MD  Blood Pressure: 158/85 mmHg  Height: 180 cm  Weight: 107 kg  BSA: 2.3 m2  ------------------------------------------------------------------------  PROCEDURE: Transthoracic echocardiogram with 2-D, M-Mode  and complete spectral and color flow Doppler. Verbal  consent was obtained for injection of  Ultrasonic Enhancing  Agent following a discussion of risks and benefits.  Following intravenous injection of Ultrasonic Enhancing  Agent , harmonic imaging was performed.  INDICATION: Edema, unspecified (R60.9)  ------------------------------------------------------------------------  Dimensions:    Normal Values:  LA:     3.2    2.0 - 4.0 cm  Ao:     3.6    2.0 - 3.8 cm  SEPTUM: 1.4    0.6 - 1.2 cm  PWT:    1.3    0.6 - 1.1 cm  LVIDd:  4.8    3.0- 5.6 cm  LVIDs:  3.0    1.8 - 4.0 cm  Derived variables:  LVMI: 123 g/m2  RWT: 0.56  Fractional short: 38 %  EF (Visual Estimate): 35 %  Doppler Peak Velocity (m/sec): AoV=1.5  ------------------------------------------------------------------------  Observations:  Mitral Valve: Mitral annular calcification. Mild mitral  regurgitation.  Aortic Valve/Aorta: Calcified trileaflet aortic valve with  normal opening. Peak transaortic valve gradient equals 10  mm Hg, mean transaortic valve gradient equals 6 mm Hg,  aortic valve velocity time integral equals 40 cm, estimated  aortic valve area equals 2.1 sqcm. Peak left ventricular  outflow tract gradient equals 3 mm Hg, mean gradient is  equal to 2 mm Hg, LVOT velocity time integral equals 20 cm.  Aortic Root: 3.6 cm.  LVOT diameter: 2.2 cm.  Left Atrium: Severely dilated left atrium.  LA volume index  = 59 cc/m2.  Left Ventricle: Endocardial visualization enhanced with  intravenous injection of Ultrasonic Enhancing Agent  (Definity). moderate to severe segmental left ventricular  systolic dysfunction. There is hypokinesis of anterior  wall, septum and apex. Visual estimate of EF about 35%.  Increased relative wall thickness with normal left  ventricular mass index, consistent with concentric left  ventricular remodeling. Normal diastolic function  Right Heart: Normal right atrium. Normal right ventricular  size with decreased right ventricular systolic function.  Normal tricuspid valve. Mild tricuspid regurgitation.  Normal pulmonic valve. Minimal pulmonic regurgitation.  Pericardium/Pleura: Normal pericardium with no pericardial  effusion.  Bilateral pleural effusions.  Hemodynamic: Estimated right atrial pressure is 8 mm Hg.  Estimated right ventricular systolic pressure equals 24 mm  Hg, assuming right atrial pressure equals 8 mm Hg,  consistent with normal pulmonary pressures.  ------------------------------------------------------------------------  Conclusions:  1. Calcified trileaflet aortic valve with normal opening.  Peak transaortic valve gradient equals 10 mm Hg, mean  transaortic valve gradient equals 6 mm Hg, aortic valve  velocity time integral equals 40 cm, estimated aortic valve  area equals 2.1 sqcm.  2. Endocardial visualization enhanced with intravenous  injection of Ultrasonic Enhancing Agent (Definity).  moderate to severe segmental left ventricular systolic  dysfunction. There is hypokinesis of anterior wall, septum  and apex. Visual estimate of EF about 35%.  3. Normal right ventricular size with decreased right  ventricular systolic function.  4. Normal tricuspid valve. Mild tricuspid regurgitation.  5. Estimated pulmonary artery systolic pressure equals 24  mm Hg, assuming right atrial pressure equals 8  mm Hg,  consistent with normal pulmonary pressures.  6. Bilateral pleural effusions.         PULMONARY CONSULT  Brayan Verma MD  [phone]    Initial HPI on admission:  HPI:  72yo M w/ PMHx of CAD (s/p CABG 2019), CKD (unknown stage), DM2, Parkinson's Disease, HTN, depression presents with bilateral leg swelling, patient's daughter in-law at bedside Yoly Hardin ([facility] Nurse) provides the history, the daughter reports the patient is a poor historian, unable to remember having conversations with others and likely cannot weigh risk/benefits of medical conditions, she reports that he has had bilateral lower extremity swelling for the past few months, but over the past 2 weeks it has significantly worsened, he has further difficulty ambulating due to swelling, his outpatient provider attempted to manage with 20mg lasix and then increased to 40mg lasix but the patient never took the increased dose, his symptoms ar persistent throughout the day and are extremely severe, he has developed wounds of the legs with weeping of fluid due to the swelling, she reports that the patient is frequently non-compliant with medications and medical management, he lives at home with his son and daughter in law, he denies chest pain, shortness of breath, fever/chills, cough, sputum, in the ED, he was tachycardic but hemodynamically stable, afebrile, saturating well on room air, labs were significant for elevated BNP, elevated creatinine, imaging showed moderate left pleural effusion, patient was admitted to general medicine for further management      Patient evaluated in MICU post lumbar artery embolization in setting or RP bleed. Admitted 10/21 with decompensated heart failure, edema/fluid overload, and ABBY. Admission chest imaging with bilateral L>R pleural effusions.     PAST MEDICAL & SURGICAL HISTORY:  Hypertension    Diabetes Mellitus, Type 2    Hypercholesterolemia    Parkinson disease    CAD (coronary artery disease)  s/p 1 stent in 2010 and CABG 2019    S/P CABG (coronary artery bypass graft)  2019    S/P hip replacement, right  2016    Anxiety    Depression, major      Allergies    adhesives (Rash)  latex (Urticaria)  No Known Drug Allergies    Intolerances      FAMILY HISTORY:  Family history of hypertension    Family history of malaria    Family history of pulmonary fibrosis (Sibling)     Social History:  Social History (marital status, living situation, occupation, tobacco use, alcohol and drug use, and sexual history): denies current alcohol, recreational drug or tobacco use, lives with son and daughter-in-law at home     Tobacco Screening:  · Core Measure Site	No    Risk Assessment:    Present on Admission:  Deep Venous Thrombosis	no  Pulmonary Embolus	no       Review of Systems:  · Negative General Symptoms	no fever; no chills  · Skin/Breast Comments	skin breakdown in lower extremities ,erythema, swelling  · Negative Ophthalmologic Symptoms	no blurred vision L; no blurred vision R; no loss of vision L; no loss of vision R  · Negative ENMT Symptoms	no nasal congestion; no throat pain  · Negative Respiratory and Thorax Symptoms	no wheezing; no dyspnea; no cough  · Negative Cardiovascular Symptoms	no chest pain; no palpitations  · Negative Gastrointestinal Symptoms	no nausea; no vomiting  · General Genitourinary Symptoms	incontinence  · Musculoskeletal Symptoms	joint swelling; joint pain; myalgia; muscle cramps  · Negative Neurological Symptoms	no loss of consciousness  · Neurological Symptoms	weakness; confusion  · Negative Psychiatric Symptoms	no suicidal ideation  · Psychiatric Symptoms	depression; anxiety  · Negative Endocrine Symptoms	no cold intolerance; no heat intolerance  · Negative Allergic Reactions	no anaphylaxis; no photosensitivity    Goals of Care:    GOALS OF CARE:  · Participants	Family  · Child(mohini)	Daughter-in-law Yoly Hardin    Allergies and Intolerances:        Allergies:  	No Known Drug Allergies:   	latex: Latex, Urticaria  	adhesives: Miscellaneous, Rash      Medications:  MEDICATIONS  (STANDING):  albuterol/ipratropium for Nebulization 3 milliLiter(s) Nebulizer every 6 hours  ascorbic acid 500 milliGRAM(s) Oral daily  atorvastatin 80 milliGRAM(s) Oral at bedtime  calcium acetate 1334 milliGRAM(s) Oral three times a day with meals  chlorhexidine 2% Cloths 1 Application(s) Topical daily  chlorhexidine 4% Liquid 1 Application(s) Topical <User Schedule>  cholecalciferol 1000 Unit(s) Oral daily  dextrose 40% Gel 15 Gram(s) Oral once  dextrose 5%. 1000 milliLiter(s) (50 mL/Hr) IV Continuous <Continuous>  dextrose 5%. 1000 milliLiter(s) (100 mL/Hr) IV Continuous <Continuous>  dextrose 50% Injectable 25 Gram(s) IV Push once  dextrose 50% Injectable 12.5 Gram(s) IV Push once  dextrose 50% Injectable 25 Gram(s) IV Push once  folic acid 1 milliGRAM(s) Oral daily  glucagon  Injectable 1 milliGRAM(s) IntraMuscular once  insulin glargine Injectable (LANTUS) 6 Unit(s) SubCutaneous at bedtime  insulin lispro (ADMELOG) corrective regimen sliding scale   SubCutaneous every 6 hours  levothyroxine 25 MICROGram(s) Oral daily  metoprolol tartrate 25 milliGRAM(s) Oral two times a day  midodrine 10 milliGRAM(s) Oral every 8 hours  mupirocin 2% Ointment 1 Application(s) Both Nostrils two times a day  Nephro-celeste 1 Tablet(s) Oral daily  norepinephrine Infusion 0.05 MICROgram(s)/kG/Min (9.04 mL/Hr) IV Continuous <Continuous>  pantoprazole  Injectable 40 milliGRAM(s) IV Push daily  piperacillin/tazobactam IVPB.. 3.375 Gram(s) IV Intermittent every 12 hours  polyethylene glycol 3350 17 Gram(s) Oral daily  senna 2 Tablet(s) Oral at bedtime  trihexyphenidyl 2 milliGRAM(s) Oral three times a day    MEDICATIONS  (PRN):  acetaminophen     Tablet .. 650 milliGRAM(s) Oral every 6 hours PRN Mild Pain (1 - 3)  guaiFENesin Oral Liquid (Sugar-Free) 200 milliGRAM(s) Oral every 6 hours PRN Cough  oxyCODONE    IR 5 milliGRAM(s) Oral every 4 hours PRN Severe Pain (7 - 10)  sodium chloride 0.9% lock flush 10 milliLiter(s) IV Push every 1 hour PRN Pre/post blood products, medications, blood draw, and to maintain line patency    Vital Signs Last 24 Hrs  T(C): 36.7 (29 Oct 2021 12:00), Max: 36.7 (29 Oct 2021 12:00)  T(F): 98 (29 Oct 2021 12:00), Max: 98 (29 Oct 2021 12:00)  HR: 112 (29 Oct 2021 13:30) (55 - 150)  BP: 127/83 (29 Oct 2021 13:30) (72/50 - 215/96)  BP(mean): 100 (29 Oct 2021 13:30) (53 - 138)  RR: 22 (29 Oct 2021 13:30) (12 - 35)  SpO2: 98% (29 Oct 2021 13:30) (72% - 100%)    ABG - ( 27 Oct 2021 22:16 )  pH, Arterial: 7.37  pH, Blood: x     /  pCO2: 37    /  pO2: 126   / HCO3: 21    / Base Excess: -3.5  /  SaO2: 99.3      10-28 @ 07:01  -  10-29 @ 07:00  --------------------------------------------------------  IN: 1378.4 mL / OUT: 625 mL / NET: 753.4 mL    LABS:                        7.7    21.96 )-----------( 123      ( 29 Oct 2021 06:13 )             23.4     10-28    132<L>  |  96  |  66<H>  ----------------------------<  146<H>  4.6   |  20<L>  |  3.81<H>    Ca    9.1      28 Oct 2021 23:23  Phos  5.6     10-28  Mg     2.1     10-28    TPro  6.1  /  Alb  2.5<L>  /  TBili  0.8  /  DBili  x   /  AST  21  /  ALT  <5<L>  /  AlkPhos  60  10-28      PT/INR - ( 28 Oct 2021 23:23 )   PT: 12.0 sec;   INR: 1.00 ratio         PTT - ( 28 Oct 2021 23:23 )  PTT:43.7 sec      Physical Examination:    General: Non toxic, No acute distress.      HEENT: Pupils equal, reactive to light.  Symmetric.    PULM: Diminished L BS    CVS: Regular rate and rhythm, no murmurs, rubs, or gallops    ABD: Soft, nondistended, nontender, normoactive bowel sounds, no masses    EXT: 3+ edema/anasarca    SKIN: Warm and well perfused, no rashes noted.    NEURO: Alert, oriented, interactive, nonfocal    RADIOLOGY REVIEWED PERSONALLY  CXR:    EXAM:  XR CHEST PORTABLE URGENT 1V                            PROCEDURE DATE:  10/21/2021      INTERPRETATION:  CLINICAL INFORMATION: Lower extremity swelling.    TECHNIQUE: Frontal radiograph of the chest    COMPARISON: 10/13/2019.    FINDINGS:    Heart size and the mediastinum cannot be accurately evaluated on this projection. Median sternotomy sutures and surgical clips again seen.    Mild right basilar subsegmental atelectasis.  There is mild asymmetric pulmonary vascular congestion on the left.  Left lower and retrocardiac opacity with obscuration of left hemidiaphragm appears slightly smaller.  No right pleural effusion.  No pneumothorax.  There is osteoarthritic degenerative change of the spine.      IMPRESSION:    Mild right basilar subsegmental atelectasis.    Mild asymmetric pulmonary vascular congestion on the left.    Left lower and retrocardiac opacity which could be due to a moderate size left pleural effusion with associated passive atelectasis, appears slightly smaller. Atelectasis of other cause and/or other underlying pathology is not excluded.    CT Abd:    EXAM:  CT ABDOMEN AND PELVIS IC                            PROCEDURE DATE:  10/27/2021      INTERPRETATION:  CLINICAL INFORMATION: RP bleed, pain, anemia, rule out active bleed.    COMPARISON: CT abdomen and pelvis 10/26/2021    CONTRAST/COMPLICATIONS:  IV Contrast: Omnipaque 350  90 cc administered   10 cc discarded  Oral Contrast: None  Complications: None reported at time of study completion    PROCEDURE:  CT of the Abdomen and Pelvis was performed.  Precontrast, Arterial and Delayed phases were performed.  Sagittal and coronal reformats were performed.    FINDINGS:  Study limited by delayed arterial phase.    LOWER CHEST: Cardiomegaly. Status post sternotomy and CABG. Aortic valve and coronary artery calcifications. Bibasilar pulmonary opacities, unchanged. Moderate left and small right pleural effusions with adjacent passive atelectasis, unchanged    LIVER: Within normal limits.  BILE DUCTS: Normal caliber.  GALLBLADDER: Within normal limits.  SPLEEN: Within normal limits.  PANCREAS: A 1.2 cm cystic lesion in the pancreatic neck (6:52). No main duct dilatation.  ADRENALS: Within normal limits.  KIDNEYS/URETERS: Bilateral cysts. Left kidney is displaced anteriorly by the large left retroperitoneal hematoma which is inseparable from the mid to distal ureter resulting in mild left-sided hydronephrosis.    BLADDER: Mildly distended and anterior right laterally displaced by the retroperitoneal hematoma.  REPRODUCTIVE ORGANS: Prostatomegaly measuring 5.7 cm in transverse dimension.    BOWEL: Extensive colonic diverticulosis, concentrated in the sigmoid, without acute diverticulitis. No bowel wall thickening or obstruction. Appendix is normal.  PERITONEUM: New small ascites, primarily perihepatic.  VESSELS: Atherosclerotic changes.  RETROPERITONEUM/LYMPH NODES: Redemonstrated bilateral retroperitoneal hematomas, asymmetric to the left. Evaluation for active bleeding is somewhat limited due to delayed arterial phase. However, marked interval increase in size of the left sided hematoma which contains a subtle blush of linear contrast on arterial phase imaging (6:17 and 11:42), is suspicious for active bleed. Left-sided hematoma now measures 8.2 x 10.8 x 18.3 cm (anterior-posterior by transverse by craniocaudad) andnow extends into the left hemipelvis, previously 6.4 x 3.3 x 7.4 cm. Right-sided hematoma which also involves the iliacus muscle, is not significantly changed since prior.    ABDOMINAL WALL: A small fat-containing left inguinal hernia, also likely containing a small amount of hemorrhage. A tiny fat-containing umbilical hernia.  BONES: Degenerative changes. Right hip arthroplasty.    IMPRESSION:  Study limited by delayed arterial phase.    Interval increase in size of a now large left retroperitoneal hematoma measuring up to 18.3 cm in craniocaudal dimension, previously 7.4 cm, with suspicion for contrast extravasation on arterial phase imaging, suspicious for active bleed. Essentially unchanged small right retroperitoneal hematoma.    Left kidney is now anteriorly displaced by the large left retroperitoneal hematoma, which also contacts the mid to distal ureter which results in new mild left sided hydroureteronephrosis.    Unchanged moderate left and small right pleural effusions and unchanged pulmonary opacities at the lung bases.    A 1.2 cm cystic lesion in the pancreatic neck. Consider further evaluation on a nonemergent outpatient basis with MRI/MRCP.    New small ascites.    Findings regarding the left retroperitoneal hemorrhagewere discussed by Abbey DAMON with Gilberto SERRANO on 10/27/2021 at 5:56 PM.    TTE:    Patient name: ORLIN HARDIN  YOB: 1950   Age: 71 (M)   MR#: [id number]  Study Date: 10/21/2021  [facility]  Study quality: Technically difficult  Referring Physician: Marika Pineda MD  Blood Pressure: 158/85 mmHg  Height: 180 cm  Weight: 107 kg  BSA: 2.3 m2  ------------------------------------------------------------------------  PROCEDURE: Transthoracic echocardiogram with 2-D, M-Mode  and complete spectral and color flow Doppler. Verbal  consent was obtained for injection of  Ultrasonic Enhancing  Agent following a discussion of risks and benefits.  Following intravenous injection of Ultrasonic Enhancing  Agent , harmonic imaging was performed.  INDICATION: Edema, unspecified (R60.9)  ------------------------------------------------------------------------  Dimensions:    Normal Values:  LA:     3.2    2.0 - 4.0 cm  Ao:     3.6    2.0 - 3.8 cm  SEPTUM: 1.4    0.6 - 1.2 cm  PWT:    1.3    0.6 - 1.1 cm  LVIDd:  4.8    3.0- 5.6 cm  LVIDs:  3.0    1.8 - 4.0 cm  Derived variables:  LVMI: 123 g/m2  RWT: 0.56  Fractional short: 38 %  EF (Visual Estimate): 35 %  Doppler Peak Velocity (m/sec): AoV=1.5  ------------------------------------------------------------------------  Observations:  Mitral Valve: Mitral annular calcification. Mild mitral  regurgitation.  Aortic Valve/Aorta: Calcified trileaflet aortic valve with  normal opening. Peak transaortic valve gradient equals 10  mm Hg, mean transaortic valve gradient equals 6 mm Hg,  aortic valve velocity time integral equals 40 cm, estimated  aortic valve area equals 2.1 sqcm. Peak left ventricular  outflow tract gradient equals 3 mm Hg, mean gradient is  equal to 2 mm Hg, LVOT velocity time integral equals 20 cm.  Aortic Root: 3.6 cm.  LVOT diameter: 2.2 cm.  Left Atrium: Severely dilated left atrium.  LA volume index  = 59 cc/m2.  Left Ventricle: Endocardial visualization enhanced with  intravenous injection of Ultrasonic Enhancing Agent  (Definity). moderate to severe segmental left ventricular  systolic dysfunction. There is hypokinesis of anterior  wall, septum and apex. Visual estimate of EF about 35%.  Increased relative wall thickness with normal left  ventricular mass index, consistent with concentric left  ventricular remodeling. Normal diastolic function  Right Heart: Normal right atrium. Normal right ventricular  size with decreased right ventricular systolic function.  Normal tricuspid valve. Mild tricuspid regurgitation.  Normal pulmonic valve. Minimal pulmonic regurgitation.  Pericardium/Pleura: Normal pericardium with no pericardial  effusion.  Bilateral pleural effusions.  Hemodynamic: Estimated right atrial pressure is 8 mm Hg.  Estimated right ventricular systolic pressure equals 24 mm  Hg, assuming right atrial pressure equals 8 mm Hg,  consistent with normal pulmonary pressures.  ------------------------------------------------------------------------  Conclusions:  1. Calcified trileaflet aortic valve with normal opening.  Peak transaortic valve gradient equals 10 mm Hg, mean  transaortic valve gradient equals 6 mm Hg, aortic valve  velocity time integral equals 40 cm, estimated aortic valve  area equals 2.1 sqcm.  2. Endocardial visualization enhanced with intravenous  injection of Ultrasonic Enhancing Agent (Definity).  moderate to severe segmental left ventricular systolic  dysfunction. There is hypokinesis of anterior wall, septum  and apex. Visual estimate of EF about 35%.  3. Normal right ventricular size with decreased right  ventricular systolic function.  4. Normal tricuspid valve. Mild tricuspid regurgitation.  5. Estimated pulmonary artery systolic pressure equals 24  mm Hg, assuming right atrial pressure equals 8  mm Hg,  consistent with normal pulmonary pressures.  6. Bilateral pleural effusions.

## 2021-10-29 NOTE — PROGRESS NOTE ADULT - SUBJECTIVE AND OBJECTIVE BOX
Interventional Radiology Follow-Up Note.     Patient seen and examined @ bedside between 7am and 7:30am.     This is a 71y Male s/p Abdominal Angiography and Embolization on 10/29/2021 in Interventional Radiology with Dr. Templeton.   Reports abdominal pain. On levo. No PRBC required overnight.          Medication:     aspirin enteric coated: (10-27)  metoprolol tartrate: (10-29)  midodrine: (10-29)  norepinephrine Infusion: (10-28)    Vitals:   T(F): 97.2, Max: 97.9 (00:00)  HR: 58  BP: 110/52  RR: 18  SpO2: 100%    Physical Exam:  General: Nontoxic, in NAD.  Abdomen: soft, ttp. Distended.   Extremities:  right groin clean, dry and intact, soft with no evidence of hematoma, +2 femoral pulse. B/L leg edema.        LABS:  Na: 132 (10-28 @ 23:23), 134 (10-27 @ 22:27), 135 (10-27 @ 16:58), 134 (10-27 @ 05:02)  K: 4.6 (10-28 @ 23:23), 4.8 (10-27 @ 22:27), 4.6 (10-27 @ 16:58), 4.1 (10-27 @ 05:02)  Cl: 96 (10-28 @ 23:23), 99 (10-27 @ 22:27), 98 (10-27 @ 16:58), 99 (10-27 @ 05:02)  CO2: 20 (10-28 @ 23:23), 18 (10-27 @ 22:27), 18 (10-27 @ 16:58), 19 (10-27 @ 05:02)  BUN: 66 (10-28 @ 23:23), 100 (10-27 @ 22:27), 105 (10-27 @ 16:58), 99 (10-27 @ 05:02)  Cr: 3.81 (10-28 @ 23:23), 6.00 (10-27 @ 22:27), 5.52 (10-27 @ 16:58), 5.23 (10-27 @ 05:02)  Glu: 146(10-28 @ 23:23), 194(10-27 @ 22:27), 197(10-27 @ 16:58), 122(10-27 @ 05:02)    Hgb: 7.7 (10-29 @ 06:13), 8.3 (10-28 @ 23:23), 6.9 (10-28 @ 14:20), 6.6 (10-28 @ 06:11), 7.1 (10-27 @ 22:27), 6.7 (10-27 @ 16:58), 7.1 (10-27 @ 14:38), 6.5 (10-27 @ 05:02), 6.5 (10-26 @ 19:21)  Hct: 23.4 (10-29 @ 06:13), 25.0 (10-28 @ 23:23), 21.2 (10-28 @ 14:20), 19.4 (10-28 @ 06:11), 21.5 (10-27 @ 22:27), 20.4 (10-27 @ 16:58), 21.3 (10-27 @ 14:38), 19.1 (10-27 @ 05:02), 19.7 (10-26 @ 19:21)  WBC: 21.96 (10-29 @ 06:13), 19.81 (10-28 @ 23:23), 17.02 (10-28 @ 14:20), 19.00 (10-28 @ 06:11), 17.26 (10-27 @ 22:27), 16.52 (10-27 @ 16:58), 16.38 (10-27 @ 14:38), 14.28 (10-27 @ 05:02), 12.81 (10-26 @ 19:21)  Plt: 123 (10-29 @ 06:13), 214 (10-28 @ 23:23), 194 (10-28 @ 14:20), 229 (10-28 @ 06:11), 230 (10-27 @ 22:27), 241 (10-27 @ 16:58), 224 (10-27 @ 14:38), 224 (10-27 @ 05:02), 203 (10-26 @ 19:21)    INR: 1.00 10-28-21 @ 23:23, 1.08 10-27-21 @ 22:27, 1.11 10-26-21 @ 11:06  PTT: 43.7 10-28-21 @ 23:23, 60.6 10-27-21 @ 22:27, 79.0 10-26-21 @ 11:06          LIVER FUNCTIONS - ( 28 Oct 2021 23:23 )  Alb: 2.5 g/dL / Pro: 6.1 g/dL / ALK PHOS: 60 U/L / ALT: <5 U/L / AST: 21 U/L / GGT: x                Assessment/Plan:  71y Male w/ active retroperitoneal bleeding requiring persistent transfusions s/p Abdominal Angiography and Embolization of l4and l5 lumbar artery  .       - Wean pressor support per ICU.  - H&H stable.  - Continue care per ICU team.  - IR will sign off.  - TBD Dr. Templeton.   Please call IR at  [phone] with any questions, concerns, or issues regarding above.    Also available on Teams.

## 2021-10-29 NOTE — PROGRESS NOTE ADULT - SUBJECTIVE AND OBJECTIVE BOX
INTERVAL HPI/OVERNIGHT EVENTS:    no acute overnight events   Pt seen and examined  resting comfortably   no melena/brbpr    MEDICATIONS  (STANDING):  albuterol/ipratropium for Nebulization 3 milliLiter(s) Nebulizer every 6 hours  ascorbic acid 500 milliGRAM(s) Oral daily  atorvastatin 80 milliGRAM(s) Oral at bedtime  calcium acetate 1334 milliGRAM(s) Oral three times a day with meals  chlorhexidine 2% Cloths 1 Application(s) Topical daily  chlorhexidine 4% Liquid 1 Application(s) Topical <User Schedule>  cholecalciferol 1000 Unit(s) Oral daily  dextrose 40% Gel 15 Gram(s) Oral once  dextrose 5%. 1000 milliLiter(s) (50 mL/Hr) IV Continuous <Continuous>  dextrose 5%. 1000 milliLiter(s) (100 mL/Hr) IV Continuous <Continuous>  dextrose 50% Injectable 25 Gram(s) IV Push once  dextrose 50% Injectable 12.5 Gram(s) IV Push once  dextrose 50% Injectable 25 Gram(s) IV Push once  folic acid 1 milliGRAM(s) Oral daily  glucagon  Injectable 1 milliGRAM(s) IntraMuscular once  insulin glargine Injectable (LANTUS) 6 Unit(s) SubCutaneous at bedtime  insulin lispro (ADMELOG) corrective regimen sliding scale   SubCutaneous every 6 hours  levothyroxine 25 MICROGram(s) Oral daily  metoprolol tartrate 25 milliGRAM(s) Oral two times a day  midodrine 10 milliGRAM(s) Oral every 8 hours  mupirocin 2% Ointment 1 Application(s) Both Nostrils two times a day  Nephro-celeste 1 Tablet(s) Oral daily  norepinephrine Infusion 0.05 MICROgram(s)/kG/Min (9.04 mL/Hr) IV Continuous <Continuous>  pantoprazole  Injectable 40 milliGRAM(s) IV Push daily  piperacillin/tazobactam IVPB.. 3.375 Gram(s) IV Intermittent every 12 hours  polyethylene glycol 3350 17 Gram(s) Oral daily  senna 2 Tablet(s) Oral at bedtime  trihexyphenidyl 2 milliGRAM(s) Oral three times a day    MEDICATIONS  (PRN):  acetaminophen     Tablet .. 650 milliGRAM(s) Oral every 6 hours PRN Mild Pain (1 - 3)  guaiFENesin Oral Liquid (Sugar-Free) 200 milliGRAM(s) Oral every 6 hours PRN Cough  oxyCODONE    IR 5 milliGRAM(s) Oral every 4 hours PRN Severe Pain (7 - 10)  sodium chloride 0.9% lock flush 10 milliLiter(s) IV Push every 1 hour PRN Pre/post blood products, medications, blood draw, and to maintain line patency      Allergies    adhesives (Rash)  latex (Urticaria)  No Known Drug Allergies    Intolerances        Review of Systems:    does not provide      Vital Signs Last 24 Hrs  T(C): 36.7 (29 Oct 2021 12:00), Max: 36.7 (29 Oct 2021 12:00)  T(F): 98 (29 Oct 2021 12:00), Max: 98 (29 Oct 2021 12:00)  HR: 113 (29 Oct 2021 13:15) (55 - 150)  BP: 122/79 (29 Oct 2021 13:15) (72/50 - 215/96)  BP(mean): 91 (29 Oct 2021 13:15) (53 - 138)  RR: 21 (29 Oct 2021 13:00) (12 - 35)  SpO2: 99% (29 Oct 2021 13:15) (72% - 100%)    PHYSICAL EXAM:    Constitutional: NAD, well-developed  HEENT: EOMI, throat clear  Neck: No LAD, supple  Respiratory: CTA and P  Cardiovascular: S1 and S2, RRR, no M  Gastrointestinal: BS+, soft, NT, moderate distention, neg HSM,  Extremities: No peripheral edema, neg clubing, cyanosis  Vascular: 2+ peripheral pulses  Neurological: A/O x 3, no focal deficits  Psychiatric: Normal mood, normal affect  Skin: No rashes      LABS:                        7.7    21.96 )-----------( 123      ( 29 Oct 2021 06:13 )             23.4     10-28    132<L>  |  96  |  66<H>  ----------------------------<  146<H>  4.6   |  20<L>  |  3.81<H>    Ca    9.1      28 Oct 2021 23:23  Phos  5.6     10-28  Mg     2.1     10-28    TPro  6.1  /  Alb  2.5<L>  /  TBili  0.8  /  DBili  x   /  AST  21  /  ALT  <5<L>  /  AlkPhos  60  10-28    PT/INR - ( 28 Oct 2021 23:23 )   PT: 12.0 sec;   INR: 1.00 ratio         PTT - ( 28 Oct 2021 23:23 )  PTT:43.7 sec      RADIOLOGY & ADDITIONAL TESTS:

## 2021-10-29 NOTE — PROGRESS NOTE ADULT - ASSESSMENT
A/P: 72yo M w/ PMHx of CAD (s/p CABG 2019), CKD (unknown stage), DM2, Parkinson's Disease, HTN, depression presents with new onset acute heart failure exacerbation     Impression:    BLE PVD w/ venous stasis   RLE wounds  Incontinence Dermatitis  Sacral/bilateral buttocks - Deep tissue injury in evolution present on admission   Incontinent of stool and urine    Recommend:  1.) topical therapy: sacral/bilateral buttocks - cleanse with incontinence cleanser, pat dry, apply cavilon, cover open area with allevyn foam dressing  2.) BLE cleansing w/ moisturizing and Compression   3.) BLE elevation  4.) Nutrition optimization   5.) Glycemic control      6.) Maintain on an alternating air with low air loss surface  7.) Turn and reposition Q 2 hours  8.) offload heels/feet with complete cair air fluidized boots; ensure soles of feet do not rest on foot board of bed  9.) Chair cushion for chair sitting    Care as per medicine, will follow w/ you  Upon discharge f/u [facility] outpatient [facility] [address] [place] [address] [phone]  Seen and discussed with clinical nurse  Thank you for this consult  Chelsey Cook, ZACH-C, CWOCN [id number]

## 2021-10-29 NOTE — PROGRESS NOTE ADULT - SUBJECTIVE AND OBJECTIVE BOX
SUBJECTIVE / OVERNIGHT EVENTS:    no events overnight  hgb dropping this AM  patient seen and examined    --------------------------------------------------------------------------------------------  LABS:                        7.7    21.96 )-----------( 123      ( 29 Oct 2021 06:13 )             23.4     10-28    132<L>  |  96  |  66<H>  ----------------------------<  146<H>  4.6   |  20<L>  |  3.81<H>    Ca    9.1      28 Oct 2021 23:23  Phos  5.6     10-28  Mg     2.1     10-28    TPro  6.1  /  Alb  2.5<L>  /  TBili  0.8  /  DBili  x   /  AST  21  /  ALT  <5<L>  /  AlkPhos  60  10-28    PT/INR - ( 28 Oct 2021 23:23 )   PT: 12.0 sec;   INR: 1.00 ratio         PTT - ( 28 Oct 2021 23:23 )  PTT:43.7 sec  CAPILLARY BLOOD GLUCOSE      POCT Blood Glucose.: 178 mg/dL (29 Oct 2021 05:59)  POCT Blood Glucose.: 177 mg/dL (28 Oct 2021 23:16)  POCT Blood Glucose.: 141 mg/dL (28 Oct 2021 17:09)  POCT Blood Glucose.: 167 mg/dL (28 Oct 2021 12:43)            RADIOLOGY & ADDITIONAL TESTS:    Imaging Personally Reviewed:  [x] YES  [ ] NO    Consultant(s) Notes Reviewed:  [x] YES  [ ] NO    MEDICATIONS  (STANDING):  albuterol/ipratropium for Nebulization 3 milliLiter(s) Nebulizer every 6 hours  ascorbic acid 500 milliGRAM(s) Oral daily  atorvastatin 80 milliGRAM(s) Oral at bedtime  calcium acetate 1334 milliGRAM(s) Oral three times a day with meals  chlorhexidine 2% Cloths 1 Application(s) Topical daily  chlorhexidine 4% Liquid 1 Application(s) Topical <User Schedule>  cholecalciferol 1000 Unit(s) Oral daily  dextrose 40% Gel 15 Gram(s) Oral once  dextrose 5%. 1000 milliLiter(s) (50 mL/Hr) IV Continuous <Continuous>  dextrose 5%. 1000 milliLiter(s) (100 mL/Hr) IV Continuous <Continuous>  dextrose 50% Injectable 25 Gram(s) IV Push once  dextrose 50% Injectable 12.5 Gram(s) IV Push once  dextrose 50% Injectable 25 Gram(s) IV Push once  folic acid 1 milliGRAM(s) Oral daily  glucagon  Injectable 1 milliGRAM(s) IntraMuscular once  insulin glargine Injectable (LANTUS) 6 Unit(s) SubCutaneous at bedtime  insulin lispro (ADMELOG) corrective regimen sliding scale   SubCutaneous every 6 hours  levothyroxine 25 MICROGram(s) Oral daily  metoprolol tartrate 25 milliGRAM(s) Oral two times a day  midodrine 10 milliGRAM(s) Oral every 8 hours  mupirocin 2% Ointment 1 Application(s) Both Nostrils two times a day  Nephro-celeste 1 Tablet(s) Oral daily  norepinephrine Infusion 0.05 MICROgram(s)/kG/Min (9.04 mL/Hr) IV Continuous <Continuous>  pantoprazole  Injectable 40 milliGRAM(s) IV Push daily  piperacillin/tazobactam IVPB. 3.375 Gram(s) IV Intermittent once  piperacillin/tazobactam IVPB.. 3.375 Gram(s) IV Intermittent every 12 hours  trihexyphenidyl 2 milliGRAM(s) Oral three times a day  vancomycin  IVPB 1250 milliGRAM(s) IV Intermittent once    MEDICATIONS  (PRN):  acetaminophen     Tablet .. 650 milliGRAM(s) Oral every 6 hours PRN Mild Pain (1 - 3)  guaiFENesin Oral Liquid (Sugar-Free) 200 milliGRAM(s) Oral every 6 hours PRN Cough  oxyCODONE    IR 5 milliGRAM(s) Oral every 4 hours PRN Severe Pain (7 - 10)  sodium chloride 0.9% lock flush 10 milliLiter(s) IV Push every 1 hour PRN Pre/post blood products, medications, blood draw, and to maintain line patency      Care Discussed with Consultants/Other Providers [x] YES  [ ] NO    Vital Signs Last 24 Hrs  T(C): 36.2 (29 Oct 2021 08:00), Max: 36.6 (29 Oct 2021 00:00)  T(F): 97.2 (29 Oct 2021 08:00), Max: 97.9 (29 Oct 2021 00:00)  HR: 57 (29 Oct 2021 10:15) (55 - 121)  BP: 91/53 (29 Oct 2021 10:15) (72/50 - 215/96)  BP(mean): 67 (29 Oct 2021 10:15) (53 - 138)  RR: 17 (29 Oct 2021 10:15) (12 - 34)  SpO2: 100% (29 Oct 2021 10:15) (72% - 100%)  I&O's Summary    28 Oct 2021 07:01  -  29 Oct 2021 07:00  --------------------------------------------------------  IN: 1378.4 mL / OUT: 625 mL / NET: 753.4 mL    29 Oct 2021 07:01  -  29 Oct 2021 10:55  --------------------------------------------------------  IN: 21 mL / OUT: 0 mL / NET: 21 mL    PE:  GENERAL: NAD, awake   HEAD:  Atraumatic, Normocephalic  CHEST/LUNG: CTABL, No wheeze  HEART: Regular rate and rhythm; no murmur  ABDOMEN: Soft, Nontender, Nondistended; Bowel sounds present  EXTREMITIES:  2+ Peripheral Pulses, No clubbing, cyanosis, or edema  NEURO: No focal deficits

## 2021-10-29 NOTE — PROGRESS NOTE ADULT - SUBJECTIVE AND OBJECTIVE BOX
INTERVAL HPI/OVERNIGHT EVENTS:  - Hb improving  - No acute events overnight    SUBJECTIVE: Patient seen and examined at bedside.     OBJECTIVE:    VITAL SIGNS:  ICU Vital Signs Last 24 Hrs  T(C): 36.2 (29 Oct 2021 08:00), Max: 36.6 (29 Oct 2021 00:00)  T(F): 97.2 (29 Oct 2021 08:00), Max: 97.9 (29 Oct 2021 00:00)  HR: 58 (29 Oct 2021 09:15) (55 - 121)  BP: 102/65 (29 Oct 2021 09:15) (72/50 - 215/96)  BP(mean): 77 (29 Oct 2021 09:15) (53 - 138)  ABP: --  ABP(mean): --  RR: 14 (29 Oct 2021 09:15) (12 - 34)  SpO2: 100% (29 Oct 2021 09:15) (72% - 100%)        10-28 @ 07:01  -  10-29 @ 07:00  --------------------------------------------------------  IN: 1378.4 mL / OUT: 625 mL / NET: 753.4 mL    10-29 @ 07:01  -  10-29 @ 09:40  --------------------------------------------------------  IN: 0 mL / OUT: 0 mL / NET: 0 mL      CAPILLARY BLOOD GLUCOSE      POCT Blood Glucose.: 178 mg/dL (29 Oct 2021 05:59)        PHYSICAL EXAM:  GENERAL: NAD, AAOx2  HEAD:  Atraumatic, Normocephalic  CHEST/LUNG: CTABL, No wheezes B/L  HEART: RRR  ABDOMEN: Soft, Nontender, Nondistended; Bowel sounds present  EXTREMITIES:  2+ Peripheral Pulses, No clubbing, cyanosis, or edema  NEURO: No focal deficits    MEDICATIONS:  MEDICATIONS  (STANDING):  albuterol/ipratropium for Nebulization 3 milliLiter(s) Nebulizer every 6 hours  ascorbic acid 500 milliGRAM(s) Oral daily  atorvastatin 80 milliGRAM(s) Oral at bedtime  calcium acetate 1334 milliGRAM(s) Oral three times a day with meals  carbidopa/levodopa  25/100 2.5 Tablet(s) Oral <User Schedule>  carbidopa/levodopa  25/100 2 Tablet(s) Oral <User Schedule>  chlorhexidine 2% Cloths 1 Application(s) Topical daily  chlorhexidine 4% Liquid 1 Application(s) Topical <User Schedule>  cholecalciferol 1000 Unit(s) Oral daily  dextrose 40% Gel 15 Gram(s) Oral once  dextrose 5%. 1000 milliLiter(s) (50 mL/Hr) IV Continuous <Continuous>  dextrose 5%. 1000 milliLiter(s) (100 mL/Hr) IV Continuous <Continuous>  dextrose 50% Injectable 25 Gram(s) IV Push once  dextrose 50% Injectable 12.5 Gram(s) IV Push once  dextrose 50% Injectable 25 Gram(s) IV Push once  folic acid 1 milliGRAM(s) Oral daily  glucagon  Injectable 1 milliGRAM(s) IntraMuscular once  insulin glargine Injectable (LANTUS) 6 Unit(s) SubCutaneous at bedtime  insulin lispro (ADMELOG) corrective regimen sliding scale   SubCutaneous every 6 hours  levothyroxine 25 MICROGram(s) Oral daily  metoprolol tartrate 25 milliGRAM(s) Oral two times a day  midodrine 10 milliGRAM(s) Oral every 8 hours  mupirocin 2% Ointment 1 Application(s) Both Nostrils two times a day  Nephro-celeste 1 Tablet(s) Oral daily  norepinephrine Infusion 0.05 MICROgram(s)/kG/Min (9.04 mL/Hr) IV Continuous <Continuous>  pantoprazole  Injectable 40 milliGRAM(s) IV Push daily  trihexyphenidyl 2 milliGRAM(s) Oral three times a day    MEDICATIONS  (PRN):  acetaminophen     Tablet .. 650 milliGRAM(s) Oral every 6 hours PRN Mild Pain (1 - 3)  guaiFENesin Oral Liquid (Sugar-Free) 200 milliGRAM(s) Oral every 6 hours PRN Cough  oxyCODONE    IR 5 milliGRAM(s) Oral every 4 hours PRN Severe Pain (7 - 10)  sodium chloride 0.9% lock flush 10 milliLiter(s) IV Push every 1 hour PRN Pre/post blood products, medications, blood draw, and to maintain line patency      ALLERGIES:  Allergies    adhesives (Rash)  latex (Urticaria)  No Known Drug Allergies    Intolerances        LABS:                        7.7    21.96 )-----------( 123      ( 29 Oct 2021 06:13 )             23.4     10-28    132<L>  |  96  |  66<H>  ----------------------------<  146<H>  4.6   |  20<L>  |  3.81<H>    Ca    9.1      28 Oct 2021 23:23  Phos  5.6     10-28  Mg     2.1     10-28    TPro  6.1  /  Alb  2.5<L>  /  TBili  0.8  /  DBili  x   /  AST  21  /  ALT  <5<L>  /  AlkPhos  60  10-28    PT/INR - ( 28 Oct 2021 23:23 )   PT: 12.0 sec;   INR: 1.00 ratio         PTT - ( 28 Oct 2021 23:23 )  PTT:43.7 sec      RADIOLOGY & ADDITIONAL TESTS: Reviewed.

## 2021-10-29 NOTE — PROGRESS NOTE ADULT - ATTENDING COMMENTS
72yo M w/ PMHx of CAD (s/p CABG 2019), CKD (unknown stage), DM2, Parkinson's Disease, HTN, depression presents with new onset acute heart failure exacerbation, NSTEMI, started on hep gtt, now with bl RP bleed, acute anemia. transferred to MICU.    # Acute systolic and diastolic heart failure  # NSTEMI  # ABBY on CKD  # Atrial flutter  # acute hypoxic resp failure  # active bleeding left RP hematoma, anemia, shock  TTE mod to severe LV SD, hypokinesis anterior wall  sp shiley placement for HD, HD per renal  not candidate for cath at this point  hep gtt held 2/2 anemia  10/27 active RP bleeding, transferred to MICU, sp multiple transfusions DDAVP  10/28 IR for abd angiography and embolization l4 and l5 lumbar artery  10/29 wean pressor support, transfuse as needed  appreciate ICU care    # DM2 (diabetes mellitus, type 2)  per endo  hba1c 6.4    # CAD (coronary artery disease)  c/w atorvastatin  asa on hold    # Parkinsons disease   c/w carbidopa/levodopa  c/w trihexyphenidyl    PCP Dr. Simons

## 2021-10-29 NOTE — PROGRESS NOTE ADULT - SUBJECTIVE AND OBJECTIVE BOX
NEPHROLOGY-[facility] [phone]        Patient seen and examined in bed.  He was the same         MEDICATIONS  (STANDING):  albuterol/ipratropium for Nebulization 3 milliLiter(s) Nebulizer every 6 hours  ascorbic acid 500 milliGRAM(s) Oral daily  atorvastatin 80 milliGRAM(s) Oral at bedtime  calcium acetate 1334 milliGRAM(s) Oral three times a day with meals  chlorhexidine 2% Cloths 1 Application(s) Topical daily  chlorhexidine 4% Liquid 1 Application(s) Topical <User Schedule>  cholecalciferol 1000 Unit(s) Oral daily  dextrose 40% Gel 15 Gram(s) Oral once  dextrose 5%. 1000 milliLiter(s) (50 mL/Hr) IV Continuous <Continuous>  dextrose 5%. 1000 milliLiter(s) (100 mL/Hr) IV Continuous <Continuous>  dextrose 50% Injectable 25 Gram(s) IV Push once  dextrose 50% Injectable 12.5 Gram(s) IV Push once  dextrose 50% Injectable 25 Gram(s) IV Push once  folic acid 1 milliGRAM(s) Oral daily  glucagon  Injectable 1 milliGRAM(s) IntraMuscular once  insulin glargine Injectable (LANTUS) 6 Unit(s) SubCutaneous at bedtime  insulin lispro (ADMELOG) corrective regimen sliding scale   SubCutaneous every 6 hours  levothyroxine 25 MICROGram(s) Oral daily  metoprolol tartrate 25 milliGRAM(s) Oral two times a day  midodrine 20 milliGRAM(s) Oral every 8 hours  mupirocin 2% Ointment 1 Application(s) Both Nostrils two times a day  Nephro-celeste 1 Tablet(s) Oral daily  norepinephrine Infusion 0.05 MICROgram(s)/kG/Min (9.04 mL/Hr) IV Continuous <Continuous>  pantoprazole  Injectable 40 milliGRAM(s) IV Push daily  piperacillin/tazobactam IVPB.. 3.375 Gram(s) IV Intermittent every 12 hours  polyethylene glycol 3350 17 Gram(s) Oral daily  senna 2 Tablet(s) Oral at bedtime  trihexyphenidyl 2 milliGRAM(s) Oral three times a day      VITAL:  T(C): , Max: 36.7 (10-29-21 @ 12:00)  T(F): , Max: 98 (10-29-21 @ 12:00)  HR: 76 (10-29-21 @ 16:00)  BP: 129/94 (10-29-21 @ 16:00)  BP(mean): 102 (10-29-21 @ 16:00)  RR: 23 (10-29-21 @ 16:00)  SpO2: 100% (10-29-21 @ 16:00)  Wt(kg): --    I and O's:    10-28 @ 07:01  -  10-29 @ 07:00  --------------------------------------------------------  IN: 1378.4 mL / OUT: 625 mL / NET: 753.4 mL    10-29 @ 07:01  -  10-29 @ 16:18  --------------------------------------------------------  IN: 376 mL / OUT: 0 mL / NET: 376 mL          PHYSICAL EXAM:    Constitutional: NAD  Neck:  No JVD  Respiratory: CTAB/L  Cardiovascular: S1 and S2  Gastrointestinal: BS+, soft, NT/ND  Extremities: No peripheral edema  Neurological: A/O x 3, no focal deficits  Psychiatric: Normal mood, normal affect  : No Javier  Skin: No rashes  Access: [facility]     LABS:                        7.6    19.77 )-----------( 228      ( 29 Oct 2021 13:45 )             22.4     10-28    132<L>  |  96  |  66<H>  ----------------------------<  146<H>  4.6   |  20<L>  |  3.81<H>    Ca    9.1      28 Oct 2021 23:23  Phos  5.6     10-28  Mg     2.1     10-28    TPro  6.1  /  Alb  2.5<L>  /  TBili  0.8  /  DBili  x   /  AST  21  /  ALT  <5<L>  /  AlkPhos  60  10-28          Urine Studies:          RADIOLOGY & ADDITIONAL STUDIES:

## 2021-10-29 NOTE — PROGRESS NOTE ADULT - ASSESSMENT
70yo M w/ PMH of CAD (s/p CABG 2019), CKD (unknown stage), DM2, Parkinson's Disease, HTN, depression p/w b/l LE edema found to have ARF and NSTEMI w/ new Aflutter started on heparin gtt and HD. Course c/b RP bleed w/ anemia, admitted to the MICU for closer monitoring while on HD.    # Neuro:  - A&Ox2  - Continue to monitor    // Parkinson's Dz  - c/w Carbidopa/Levodopa  - c/w Trihexyphenidyl    # CV:  // CAD/ NSTEMI  - TTE mod to severe LVS dysfxn, hypokinesis anterior wall  - Holding heparin gtt and ASA given increasing RP bleed  - c/w Atorvastatin  - Unstable for cath at this moment    // Aflutter  - c/w Lopressor 25mg BID  - Monitor for hypotension given RP bleed    # Pulm:  - On 4L NC  - Wean as tolerated    # GI:  - No active issues  - Diet: Renal  - Fluid Restrict to 1.5L    # Renal:  // ABBY on CKD  - s/p shiley placement w/ IR  - c/w Phoslo 1337 tid  - Nephro consulted, recommended HD in AM. Pending patient return from IR    # Endo  // DM2  - A1c 6.4%  - c/w Lantus 6U qHS  - c/w ISS    // Hypothyroid  - c/w Synthroid 25mcg QD    # ID:  - Pt without strong objective or clinical evidence of infection. Will observe off antibiotics    # Heme/Onc:  - Holding AC i/s/o RP bleed    # Skin:  // No active issues    # Ethics   - FULL CODE

## 2021-10-29 NOTE — PROGRESS NOTE ADULT - PROBLEM SELECTOR PLAN 6
likely worsening CKD  renal cs appreciated  i/o, volume overloaded  chf mgmt as above  sp shiley placement w/ IR  Cont Phoslo likely worsening CKD  renal cs appreciated, HD per renal  i/o, volume overloaded  sp shiley placement w/ IR  Cont Phoslo

## 2021-10-29 NOTE — PROGRESS NOTE ADULT - SUBJECTIVE AND OBJECTIVE BOX
Wound Surgery Progress Note:    HPI:  70yo M w/ PMHx of CAD (s/p CABG 2019), CKD (unknown stage), DM2, Parkinson's Disease, HTN, depression presents with bilateral leg swelling, patient's daughter in-law at bedside Yoly Quintero ([address] [place] [address] Nurse) provides the history, the daughter reports the patient is a poor historian, unable to remember having conversations with others and likely cannot weigh risk/benefits of medical conditions, she reports that he has had bilateral lower extremity swelling for the past few months, but over the past 2 weeks it has significantly worsened, he has further difficulty ambulating due to swelling, his outpatient provider attempted to manage with 20mg lasix and then increased to 40mg lasix but the patient never took the increased dose, his symptoms ar persistent throughout the day and are extremely severe, he has developed wounds of the legs with weeping of fluid due to the swelling, she reports that the patient is frequently non-compliant with medications and medical management, he lives at home with his son and daughter in law, he denies chest pain, shortness of breath, fever/chills, cough, sputum, in the ED, he was tachycardic but hemodynamically stable, afebrile, saturating well on room air, labs were significant for elevated BNP, elevated creatinine, imaging showed moderate left pleural effusion, patient was admitted to general medicine for further management  (21 Oct 2021 07:06)    Follow up visit to patient for management of sacral/bilateral skin and bilateral leg.     PAST MEDICAL & SURGICAL HISTORY:  Hypertension  Diabetes Mellitus, Type 2  Hypercholesterolemia  Parkinson disease  CAD (coronary artery disease)  s/p 1 stent in 2010 and CABG 2019  S/P CABG (coronary artery bypass graft)  2019  S/P hip replacement, right  2016  Anxiety  Depression, major    MEDICATIONS  (STANDING):  albuterol/ipratropium for Nebulization 3 milliLiter(s) Nebulizer every 6 hours  ascorbic acid 500 milliGRAM(s) Oral daily  atorvastatin 80 milliGRAM(s) Oral at bedtime  calcium acetate 1334 milliGRAM(s) Oral three times a day with meals  chlorhexidine 2% Cloths 1 Application(s) Topical daily  chlorhexidine 4% Liquid 1 Application(s) Topical <User Schedule>  cholecalciferol 1000 Unit(s) Oral daily  dextrose 40% Gel 15 Gram(s) Oral once  dextrose 5%. 1000 milliLiter(s) (50 mL/Hr) IV Continuous <Continuous>  dextrose 5%. 1000 milliLiter(s) (100 mL/Hr) IV Continuous <Continuous>  dextrose 50% Injectable 25 Gram(s) IV Push once  dextrose 50% Injectable 12.5 Gram(s) IV Push once  dextrose 50% Injectable 25 Gram(s) IV Push once  folic acid 1 milliGRAM(s) Oral daily  glucagon  Injectable 1 milliGRAM(s) IntraMuscular once  insulin glargine Injectable (LANTUS) 6 Unit(s) SubCutaneous at bedtime  insulin lispro (ADMELOG) corrective regimen sliding scale   SubCutaneous every 6 hours  levothyroxine 25 MICROGram(s) Oral daily  metoprolol tartrate 25 milliGRAM(s) Oral two times a day  midodrine 10 milliGRAM(s) Oral every 8 hours  mupirocin 2% Ointment 1 Application(s) Both Nostrils two times a day  Nephro-celeste 1 Tablet(s) Oral daily  norepinephrine Infusion 0.05 MICROgram(s)/kG/Min (9.04 mL/Hr) IV Continuous <Continuous>  pantoprazole  Injectable 40 milliGRAM(s) IV Push daily  piperacillin/tazobactam IVPB.. 3.375 Gram(s) IV Intermittent every 12 hours  trihexyphenidyl 2 milliGRAM(s) Oral three times a day    MEDICATIONS  (PRN):  acetaminophen     Tablet .. 650 milliGRAM(s) Oral every 6 hours PRN Mild Pain (1 - 3)  guaiFENesin Oral Liquid (Sugar-Free) 200 milliGRAM(s) Oral every 6 hours PRN Cough  oxyCODONE    IR 5 milliGRAM(s) Oral every 4 hours PRN Severe Pain (7 - 10)  sodium chloride 0.9% lock flush 10 milliLiter(s) IV Push every 1 hour PRN Pre/post blood products, medications, blood draw, and to maintain line patency    Allergies    adhesives (Rash)  latex (Urticaria)  No Known Drug Allergies    Intolerances    Vital Signs Last 24 Hrs  T(C): 36.7 (29 Oct 2021 12:00), Max: 36.7 (29 Oct 2021 12:00)  T(F): 98 (29 Oct 2021 12:00), Max: 98 (29 Oct 2021 12:00)  HR: 113 (29 Oct 2021 12:00) (55 - 150)  BP: 111/82 (29 Oct 2021 12:00) (72/50 - 215/96)  BP(mean): 91 (29 Oct 2021 12:00) (53 - 138)  RR: 27 (29 Oct 2021 12:00) (12 - 35)  SpO2: 98% (29 Oct 2021 12:00) (72% - 100%)    Physical Exam:  General: Obese, agitated  Respiratory: no SOB on room air  Gastrointestinal soft NT/ND    Neurology:  weakened strength & sensation grossly intact  Musculoskeletal:  no deformities/ contractures  Vascular: BLE equally warm,  BLE edema equal &       no acute ischemia noted     R>LLE hemosiderin staining     RLE dry serous crusting  Skin:  dry flakey skin     hyperpigmented skin hyperpigmentation, small area of denuded skin on the RIght buttocks L 1cm xW 2cm x D 0.1cm     no drainage  No odor, erythema, increased warmth, tenderness, induration, fluctuance      LABS:  10-28    132<L>  |  96  |  66<H>  ----------------------------<  146<H>  4.6   |  20<L>  |  3.81<H>    Ca    9.1      28 Oct 2021 23:23  Phos  5.6     10-28  Mg     2.1     10-28    TPro  6.1  /  Alb  2.5<L>  /  TBili  0.8  /  DBili  x   /  AST  21  /  ALT  <5<L>  /  AlkPhos  60  10-28                          7.7    21.96 )-----------( 123      ( 29 Oct 2021 06:13 )             23.4     PT/INR - ( 28 Oct 2021 23:23 )   PT: 12.0 sec;   INR: 1.00 ratio         PTT - ( 28 Oct 2021 23:23 )  PTT:43.7 sec

## 2021-10-29 NOTE — PROGRESS NOTE ADULT - ASSESSMENT
Assessment  DMT2: 71y Male with DM T2 with hyperglycemia, A1C 6.4%, was on insulin at home, now on low-dose basal insulin and coverage, blood sugars are trending within overall acceptable range, no hypoglycemias, appears comfortable in NAD, remains NPO.  Hypothyroidism: Patient has no history thyroid disease, was not on any thyroid supplements, subclinical with low-normal FT4, lethargic, started on synthroid 25 mcg po daily.  CHF: on medications, stable, monitored.  HTN: Controlled,  on antihypertensive medications.  Parkinsons: on meds, monitored.  CKD: Monitor labs/BMP      Kelsie Reece MD  Cell: [phone]  Office: [phone]     Assessment  DMT2: 71y Male with DM T2 with hyperglycemia, A1C 6.4%, was on insulin at home, now on low-dose basal insulin and coverage, blood sugars are trending within overall acceptable range, no hypoglycemias,  appears comfortable in NAD, remains NPO.  Hypothyroidism: Patient has no history thyroid disease, was not on any thyroid supplements, subclinical with low-normal FT4, lethargic, started on synthroid 25 mcg po daily.  CHF: on medications, stable, monitored.  HTN: Controlled,  on antihypertensive medications.  Parkinsons: on meds, monitored.  CKD: Monitor labs/BMP      Kelsie Reece MD  Cell: [phone]  Office: [phone]

## 2021-10-29 NOTE — PROGRESS NOTE ADULT - PROBLEM SELECTOR PLAN 3
-insulin sliding scale  -diabetic diet  hba1c 6.4  hypoglycemic episodes  endo cs appreciated  lantus decreased, consider dc  monitor BS closely hba1c 6.4  endo cs appreciated

## 2021-10-29 NOTE — CONSULT NOTE ADULT - ASSESSMENT
71M with Parkinson's diseae, CAD s/p CABG, admitted 10/21 with decompensated systolic CHF, fluid overload, and ABBY. Patient started on IV heparin full dose for atrial flutter with subsequent RP bleed. Patient transferred to MICU  and is now stable s/p transfusion and empiric lumbar artery embolization in IR. On exam he is still overloaded with anasarca and mod to large L and smaller R pleural effusion. Cratinine today 3.8, downtrending from admission    REC    Observe off AC  Negative balance with diureitcs as tolerated, if feasible  Supplemental O2 to target oxygen saturation   May eventually need L thorocentesis once stabilized

## 2021-10-29 NOTE — CHART NOTE - NSCHARTNOTEFT_GEN_A_CORE
MICU Transfer Note    Transfer from: MICU  Transfer to:  (  ) Medicine    ( X ) Telemetry    (  ) RCU    (  ) Palliative    (  ) Stroke Unit    (  ) _______________  Accepting physician:       MICU COURSE:  2yo M w/ PMH of CAD (s/p CABG 2019), CKD (unknown stage), DM2, Parkinson's Disease, HTN, depression p/w b/l LE edema for the past few months. Over the past 2wks it has significantly worsened, he has further difficulty ambulating due to swelling. Pt initially treated outpt w/ PO Lasix w/ persistent symptoms c/b wounds of the legs with weeping of fluid due to the swelling. In the ED he denied F/C, CP, SOB, cough. In the ED he was tachycardic but HDS, afebrile, saturating well on RA, labs significant for elevated BNP, elevated creatinine, imaging showed moderate L pleural effusion, admitted to general medicine for cardiac w/u.    Pt was started on Hep gtt for NSTEMI and new Aflutter and started on HD for acute on chronic CKD. ON he developed of back pain and anemia, CT non contrast showed b/l RP hematoma. A repeat CT w/ IV contrast performed today to assess progression of RP bleed as pt w/ persistent anemia which showed worsening of the L hematoma w/ possible active bleeding noted. IR consulted for intervention w/ no intervention planned at this time.     Patient was transferred to the MICU on 10/27 for drop in SBP from 160s to 115s w/ persistent anemia and plan for urgent HD. Patient received 4 u and DDAVP overnight, did not require pressors. Was taken to IR in AM and had abdominal angiography and embolization of left L4 and L5 lumbar arteries. Patient received hemodialysis in the afternoon. Hb has remained stable. Started on empiric vanc and zosyn on 10/29 for elevated WBC counts, blood cultures and MRSA swab sent. Discontinued carbidopa/levodopa for delirium.       ASSESSMENT & PLAN:         For Follow-Up:          Vital Signs Last 24 Hrs  T(C): 36.7 (29 Oct 2021 12:00), Max: 36.7 (29 Oct 2021 12:00)  T(F): 98 (29 Oct 2021 12:00), Max: 98 (29 Oct 2021 12:00)  HR: 60 (29 Oct 2021 14:00) (55 - 150)  BP: 90/52 (29 Oct 2021 13:45) (72/50 - 215/96)  BP(mean): 65 (29 Oct 2021 13:45) (53 - 138)  RR: 22 (29 Oct 2021 13:30) (12 - 35)  SpO2: 100% (29 Oct 2021 14:00) (72% - 100%)  I&O's Summary    28 Oct 2021 07:01  -  29 Oct 2021 07:00  --------------------------------------------------------  IN: 1378.4 mL / OUT: 625 mL / NET: 753.4 mL    29 Oct 2021 07:01  -  29 Oct 2021 15:00  --------------------------------------------------------  IN: 373 mL / OUT: 0 mL / NET: 373 mL          MEDICATIONS  (STANDING):  albuterol/ipratropium for Nebulization 3 milliLiter(s) Nebulizer every 6 hours  ascorbic acid 500 milliGRAM(s) Oral daily  atorvastatin 80 milliGRAM(s) Oral at bedtime  calcium acetate 1334 milliGRAM(s) Oral three times a day with meals  chlorhexidine 2% Cloths 1 Application(s) Topical daily  chlorhexidine 4% Liquid 1 Application(s) Topical <User Schedule>  cholecalciferol 1000 Unit(s) Oral daily  dextrose 40% Gel 15 Gram(s) Oral once  dextrose 5%. 1000 milliLiter(s) (50 mL/Hr) IV Continuous <Continuous>  dextrose 5%. 1000 milliLiter(s) (100 mL/Hr) IV Continuous <Continuous>  dextrose 50% Injectable 25 Gram(s) IV Push once  dextrose 50% Injectable 12.5 Gram(s) IV Push once  dextrose 50% Injectable 25 Gram(s) IV Push once  folic acid 1 milliGRAM(s) Oral daily  glucagon  Injectable 1 milliGRAM(s) IntraMuscular once  insulin glargine Injectable (LANTUS) 6 Unit(s) SubCutaneous at bedtime  insulin lispro (ADMELOG) corrective regimen sliding scale   SubCutaneous every 6 hours  levothyroxine 25 MICROGram(s) Oral daily  metoprolol tartrate 25 milliGRAM(s) Oral two times a day  midodrine 10 milliGRAM(s) Oral every 8 hours  mupirocin 2% Ointment 1 Application(s) Both Nostrils two times a day  Nephro-celeste 1 Tablet(s) Oral daily  norepinephrine Infusion 0.05 MICROgram(s)/kG/Min (9.04 mL/Hr) IV Continuous <Continuous>  pantoprazole  Injectable 40 milliGRAM(s) IV Push daily  piperacillin/tazobactam IVPB.. 3.375 Gram(s) IV Intermittent every 12 hours  polyethylene glycol 3350 17 Gram(s) Oral daily  senna 2 Tablet(s) Oral at bedtime  trihexyphenidyl 2 milliGRAM(s) Oral three times a day    MEDICATIONS  (PRN):  acetaminophen     Tablet .. 650 milliGRAM(s) Oral every 6 hours PRN Mild Pain (1 - 3)  guaiFENesin Oral Liquid (Sugar-Free) 200 milliGRAM(s) Oral every 6 hours PRN Cough  oxyCODONE    IR 5 milliGRAM(s) Oral every 4 hours PRN Severe Pain (7 - 10)  sodium chloride 0.9% lock flush 10 milliLiter(s) IV Push every 1 hour PRN Pre/post blood products, medications, blood draw, and to maintain line patency        LABS                                            7.6                   Neurophils% (auto):   x      (10-29 @ 13:45):    19.77)-----------(228          Lymphocytes% (auto):  x                                             22.4                   Eosinphils% (auto):   x        Manual%: Neutrophils x    ; Lymphocytes x    ; Eosinophils x    ; Bands%: x    ; Blasts x                                    132    |  96     |  66                  Calcium: 9.1   / iCa: x      (10-28 @ 23:23)    ----------------------------<  146       Magnesium: 2.1                              4.6     |  20     |  3.81             Phosphorous: 5.6      TPro  6.1    /  Alb  2.5    /  TBili  0.8    /  DBili  x      /  AST  21     /  ALT  <5     /  AlkPhos  60     28 Oct 2021 23:23    ( 10-28 @ 23:23 )   PT: 12.0 sec;   INR: 1.00 ratio  aPTT: 43.7 sec MICU Transfer Note    Transfer from: MICU  Transfer to:  (  ) Medicine    ( X ) Telemetry    (  ) RCU    (  ) Palliative    (  ) Stroke Unit    (  ) _______________  Accepting physician:       MICU COURSE:  2yo M w/ PMH of CAD (s/p CABG 2019), CKD (unknown stage), DM2, Parkinson's Disease, HTN, depression p/w b/l LE edema for the past few months. Over the past 2wks it has significantly worsened, he has further difficulty ambulating due to swelling. Pt initially treated outpt w/ PO Lasix w/ persistent symptoms c/b wounds of the legs with weeping of fluid due to the swelling. In the ED he denied F/C, CP, SOB, cough. In the ED he was tachycardic but HDS, afebrile, saturating well on RA, labs significant for elevated BNP, elevated creatinine, imaging showed moderate L pleural effusion, admitted to general medicine for cardiac w/u.    Pt was started on Hep gtt for NSTEMI and new Aflutter and started on HD for acute on chronic CKD. ON he developed of back pain and anemia, CT non contrast showed b/l RP hematoma. A repeat CT w/ IV contrast performed today to assess progression of RP bleed as pt w/ persistent anemia which showed worsening of the L hematoma w/ possible active bleeding noted. IR consulted for intervention w/ no intervention planned at this time.     Patient was transferred to the MICU on 10/27 for drop in SBP from 160s to 115s w/ persistent anemia and plan for urgent HD. Patient received 4 u and DDAVP overnight, did not require pressors. Was taken to IR in AM and had abdominal angiography and embolization of left L4 and L5 lumbar arteries. Patient received hemodialysis in the afternoon. Hb has remained stable. Started on empiric vanc and zosyn on 10/29 for elevated WBC counts, blood cultures and MRSA swab sent. Discontinued carbidopa/levodopa for delirium.       ASSESSMENT & PLAN:   70yo M w/ PMH of CAD (s/p CABG 2019), CKD (unknown stage), DM2, Parkinson's Disease, HTN, depression p/w b/l LE edema found to have ARF and NSTEMI w/ new Aflutter started on heparin gtt and HD. Course c/b RP bleed w/ anemia, admitted to the MICU for closer monitoring while on HD.    # Neuro:  - A&Ox2  - Continue to monitor    // Parkinson's Dz  - Carbidopa/Levodopa d/c on 10/29 for delirium, will monitor neuro status   - c/w Trihexyphenidyl    # CV:  // CAD/ NSTEMI  - TTE mod to severe LVS dysfxn, hypokinesis anterior wall  - Holding heparin gtt and ASA given increasing RP bleed  - c/w Atorvastatin  - Unstable for cath at this moment    // Aflutter  - c/w Lopressor 25mg BID  - Monitor for hypotension given RP bleed    # Pulm:  - On 4L NC  - Wean as tolerated    # GI:  - No active issues  - Diet: Renal  - Fluid Restrict to 1.5L    # Renal:  // ABBY on CKD  - s/p shiley placement w/ IR  - c/w Phoslo 1337 tid  - Nephro consulted, recommended HD in AM. Pending patient return from IR    # Endo  // DM2  - A1c 6.4%  - c/w Lantus 6U qHS  - c/w ISS    // Hypothyroid  - c/w Synthroid 25mcg QD    # ID:  - Pt without strong objective or clinical evidence of infection. Will observe off antibiotics    # Heme/Onc:  - Holding AC i/s/o RP bleed    # Skin:  // No active issues    # Ethics   - FULL CODE         For Follow-Up:          Vital Signs Last 24 Hrs  T(C): 36.7 (29 Oct 2021 12:00), Max: 36.7 (29 Oct 2021 12:00)  T(F): 98 (29 Oct 2021 12:00), Max: 98 (29 Oct 2021 12:00)  HR: 60 (29 Oct 2021 14:00) (55 - 150)  BP: 90/52 (29 Oct 2021 13:45) (72/50 - 215/96)  BP(mean): 65 (29 Oct 2021 13:45) (53 - 138)  RR: 22 (29 Oct 2021 13:30) (12 - 35)  SpO2: 100% (29 Oct 2021 14:00) (72% - 100%)  I&O's Summary    28 Oct 2021 07:01  -  29 Oct 2021 07:00  --------------------------------------------------------  IN: 1378.4 mL / OUT: 625 mL / NET: 753.4 mL    29 Oct 2021 07:01  -  29 Oct 2021 15:00  --------------------------------------------------------  IN: 373 mL / OUT: 0 mL / NET: 373 mL          MEDICATIONS  (STANDING):  albuterol/ipratropium for Nebulization 3 milliLiter(s) Nebulizer every 6 hours  ascorbic acid 500 milliGRAM(s) Oral daily  atorvastatin 80 milliGRAM(s) Oral at bedtime  calcium acetate 1334 milliGRAM(s) Oral three times a day with meals  chlorhexidine 2% Cloths 1 Application(s) Topical daily  chlorhexidine 4% Liquid 1 Application(s) Topical <User Schedule>  cholecalciferol 1000 Unit(s) Oral daily  dextrose 40% Gel 15 Gram(s) Oral once  dextrose 5%. 1000 milliLiter(s) (50 mL/Hr) IV Continuous <Continuous>  dextrose 5%. 1000 milliLiter(s) (100 mL/Hr) IV Continuous <Continuous>  dextrose 50% Injectable 25 Gram(s) IV Push once  dextrose 50% Injectable 12.5 Gram(s) IV Push once  dextrose 50% Injectable 25 Gram(s) IV Push once  folic acid 1 milliGRAM(s) Oral daily  glucagon  Injectable 1 milliGRAM(s) IntraMuscular once  insulin glargine Injectable (LANTUS) 6 Unit(s) SubCutaneous at bedtime  insulin lispro (ADMELOG) corrective regimen sliding scale   SubCutaneous every 6 hours  levothyroxine 25 MICROGram(s) Oral daily  metoprolol tartrate 25 milliGRAM(s) Oral two times a day  midodrine 10 milliGRAM(s) Oral every 8 hours  mupirocin 2% Ointment 1 Application(s) Both Nostrils two times a day  Nephro-celeste 1 Tablet(s) Oral daily  norepinephrine Infusion 0.05 MICROgram(s)/kG/Min (9.04 mL/Hr) IV Continuous <Continuous>  pantoprazole  Injectable 40 milliGRAM(s) IV Push daily  piperacillin/tazobactam IVPB.. 3.375 Gram(s) IV Intermittent every 12 hours  polyethylene glycol 3350 17 Gram(s) Oral daily  senna 2 Tablet(s) Oral at bedtime  trihexyphenidyl 2 milliGRAM(s) Oral three times a day    MEDICATIONS  (PRN):  acetaminophen     Tablet .. 650 milliGRAM(s) Oral every 6 hours PRN Mild Pain (1 - 3)  guaiFENesin Oral Liquid (Sugar-Free) 200 milliGRAM(s) Oral every 6 hours PRN Cough  oxyCODONE    IR 5 milliGRAM(s) Oral every 4 hours PRN Severe Pain (7 - 10)  sodium chloride 0.9% lock flush 10 milliLiter(s) IV Push every 1 hour PRN Pre/post blood products, medications, blood draw, and to maintain line patency        LABS                                            7.6                   Neurophils% (auto):   x      (10-29 @ 13:45):    19.77)-----------(228          Lymphocytes% (auto):  x                                             22.4                   Eosinphils% (auto):   x        Manual%: Neutrophils x    ; Lymphocytes x    ; Eosinophils x    ; Bands%: x    ; Blasts x                                    132    |  96     |  66                  Calcium: 9.1   / iCa: x      (10-28 @ 23:23)    ----------------------------<  146       Magnesium: 2.1                              4.6     |  20     |  3.81             Phosphorous: 5.6      TPro  6.1    /  Alb  2.5    /  TBili  0.8    /  DBili  x      /  AST  21     /  ALT  <5     /  AlkPhos  60     28 Oct 2021 23:23    ( 10-28 @ 23:23 )   PT: 12.0 sec;   INR: 1.00 ratio  aPTT: 43.7 sec

## 2021-10-29 NOTE — PROGRESS NOTE ADULT - PROBLEM SELECTOR PLAN 7
likely anemia of ckd, chr dz  check iron studies  no e/o active bleed, monitor while on AC  transfuse for hb<7 or symptoms    acute on chronic anemia, rt groin pain cannot r/o active intramuscular/RP bleed  CTA with active bleed  s/p IR for abdominal angiography/poss embolization - f/u acute on chronic anemia  CTA with active bleed  s/p IR for abdominal angiography/poss embolization  MICU care

## 2021-10-29 NOTE — PROGRESS NOTE ADULT - ASSESSMENT
71 M w volume overload, GI consulted for drop in hgb    1. Volume overload per cardio    2. ABBY on CKD    3. Drop in hgb, no overt GI Bleed.     4. RP bleed  s/p Abdominal Angiography and Embolization on 10/29/2021 by Interventional radiology of l4and l5 lumbar artery  - h/h overall stable    5. abdominal distention  - would check AXR for further evaluation, discussed with MICU team    The plan of care was discussed with the physician assistant and modifications were made to the notation where appropriate.   Differential diagnosis and plan of care discussed with patient after the evaluation  35 minutes spent on total encounter of which more than fifty percent of the encounter was spent counseling and/or coordinating care by the attending physician.    [facility]  Gastroenterology and Hepatology  [address] [place], [place]  Office: [phone]  Cell: [phone]

## 2021-10-29 NOTE — PROGRESS NOTE ADULT - PROBLEM SELECTOR PLAN 1
-last echo 2019 showed normal EF with mild diastolic dysfunction  -elevated BNP with lower extremity edema  -will require high doses of lasix secondary to significant renal disease (no clear CKD diagnosis but had elevated Cr in 2019, likely acute on chronic renal failure now secondary to cardiorenal syndrome)   lasix 60mg IV BID uptitrate as needed, monitor blood pressure  TTE mod to severe LVS dysfxn, hypokinesis anterior wall  -strict I/O  -fluid restriction    #NSTEMI-troponins peaked  cards cs appreciated  likely will need cath at a later point once creatinine improves  unstable for cath at this time TTE mod to severe LVS dysfxn, hypokinesis anterior wall  NSTEMI-troponins peaked  cards cs appreciated  unstable for cath at this time

## 2021-10-29 NOTE — PROGRESS NOTE ADULT - SUBJECTIVE AND OBJECTIVE BOX
Chief complaint  Patient is a 71y old  Male who presents with a chief complaint of leg swelling (29 Oct 2021 12:14)   Review of systems  Patient remains NPO, no hypoglycemic episodes.    Labs and Fingersticks  CAPILLARY BLOOD GLUCOSE      POCT Blood Glucose.: 138 mg/dL (29 Oct 2021 11:21)  POCT Blood Glucose.: 178 mg/dL (29 Oct 2021 05:59)  POCT Blood Glucose.: 177 mg/dL (28 Oct 2021 23:16)  POCT Blood Glucose.: 141 mg/dL (28 Oct 2021 17:09)      Anion Gap, Serum: 16 (10-28 @ 23:23)  Anion Gap, Serum: 17 (10-27 @ 22:27)  Anion Gap, Serum: 19 *H* (10-27 @ 16:58)      Calcium, Total Serum: 9.1 (10-28 @ 23:23)  Calcium, Total Serum: 9.6 (10-27 @ 22:27)  Calcium, Total Serum: 9.1 (10-27 @ 16:58)  Albumin, Serum: 2.5 *L* (10-28 @ 23:23)  Albumin, Serum: 2.6 *L* (10-27 @ 22:27)  Albumin, Serum: 2.5 *L* (10-27 @ 16:58)    Alanine Aminotransferase (ALT/SGPT): <5 *L* (10-28 @ 23:23)  Alanine Aminotransferase (ALT/SGPT): <5 *L* (10-27 @ 22:27)  Alanine Aminotransferase (ALT/SGPT): 6 *L* (10-27 @ 16:58)  Alkaline Phosphatase, Serum: 60 (10-28 @ 23:23)  Alkaline Phosphatase, Serum: 65 (10-27 @ 22:27)  Alkaline Phosphatase, Serum: 64 (10-27 @ 16:58)  Aspartate Aminotransferase (AST/SGOT): 21 (10-28 @ 23:23)  Aspartate Aminotransferase (AST/SGOT): 14 (10-27 @ 22:27)  Aspartate Aminotransferase (AST/SGOT): 12 (10-27 @ 16:58)        10-28    132<L>  |  96  |  66<H>  ----------------------------<  146<H>  4.6   |  20<L>  |  3.81<H>    Ca    9.1      28 Oct 2021 23:23  Phos  5.6     10-28  Mg     2.1     10-28    TPro  6.1  /  Alb  2.5<L>  /  TBili  0.8  /  DBili  x   /  AST  21  /  ALT  <5<L>  /  AlkPhos  60  10-28                        7.7    21.96 )-----------( 123      ( 29 Oct 2021 06:13 )             23.4     Medications  MEDICATIONS  (STANDING):  albuterol/ipratropium for Nebulization 3 milliLiter(s) Nebulizer every 6 hours  ascorbic acid 500 milliGRAM(s) Oral daily  atorvastatin 80 milliGRAM(s) Oral at bedtime  calcium acetate 1334 milliGRAM(s) Oral three times a day with meals  chlorhexidine 2% Cloths 1 Application(s) Topical daily  chlorhexidine 4% Liquid 1 Application(s) Topical <User Schedule>  cholecalciferol 1000 Unit(s) Oral daily  dextrose 40% Gel 15 Gram(s) Oral once  dextrose 5%. 1000 milliLiter(s) (50 mL/Hr) IV Continuous <Continuous>  dextrose 5%. 1000 milliLiter(s) (100 mL/Hr) IV Continuous <Continuous>  dextrose 50% Injectable 25 Gram(s) IV Push once  dextrose 50% Injectable 12.5 Gram(s) IV Push once  dextrose 50% Injectable 25 Gram(s) IV Push once  folic acid 1 milliGRAM(s) Oral daily  glucagon  Injectable 1 milliGRAM(s) IntraMuscular once  insulin glargine Injectable (LANTUS) 6 Unit(s) SubCutaneous at bedtime  insulin lispro (ADMELOG) corrective regimen sliding scale   SubCutaneous every 6 hours  levothyroxine 25 MICROGram(s) Oral daily  metoprolol tartrate 25 milliGRAM(s) Oral two times a day  midodrine 10 milliGRAM(s) Oral every 8 hours  mupirocin 2% Ointment 1 Application(s) Both Nostrils two times a day  Nephro-celeste 1 Tablet(s) Oral daily  norepinephrine Infusion 0.05 MICROgram(s)/kG/Min (9.04 mL/Hr) IV Continuous <Continuous>  pantoprazole  Injectable 40 milliGRAM(s) IV Push daily  piperacillin/tazobactam IVPB.. 3.375 Gram(s) IV Intermittent every 12 hours  polyethylene glycol 3350 17 Gram(s) Oral daily  senna 2 Tablet(s) Oral at bedtime  trihexyphenidyl 2 milliGRAM(s) Oral three times a day      Physical Exam  General: Patient comfortable in bed  Vital Signs Last 12 Hrs  T(F): 98 (10-29-21 @ 12:00), Max: 98 (10-29-21 @ 12:00)  HR: 111 (10-29-21 @ 13:00) (57 - 150)  BP: 106/62 (10-29-21 @ 13:00) (73/42 - 215/96)  BP(mean): 79 (10-29-21 @ 13:00) (53 - 138)  RR: 21 (10-29-21 @ 13:00) (14 - 35)  SpO2: 99% (10-29-21 @ 13:00) (72% - 100%)    Diagnostics    Free Thyroxine, Serum: AM Sched. Collection: 26-Oct-2021 06:00 (10-25 @ 11:45)  Thyroid Stimulating Hormone, Serum: AM Sched. Collection: 26-Oct-2021 06:00 (10-25 @ 11:45)  Cortisol AM, Serum: 08:00 (10-22 @ 12:47)  Free Thyroxine, Serum: AM Sched. Collection: 23-Oct-2021 06:00 (10-22 @ 12:43)  Thyroid Stimulating Hormone, Serum: AM Sched. Collection: 23-Oct-2021 06:00 (10-22 @ 12:43)           Chief complaint  Patient is a 71y old  Male who presents with a chief complaint of leg swelling (29 Oct 2021 12:14)   Review of systems  Patient remains NPO, no hypoglycemic episodes.    Labs and Fingersticks  CAPILLARY BLOOD GLUCOSE      POCT Blood Glucose.: 138 mg/dL (29 Oct 2021 11:21)  POCT Blood Glucose.: 178 mg/dL (29 Oct 2021 05:59)  POCT Blood Glucose.: 177 mg/dL (28 Oct 2021 23:16)  POCT Blood Glucose.: 141 mg/dL (28 Oct 2021 17:09)      Anion Gap, Serum: 16 (10-28 @ 23:23)  Anion Gap, Serum: 17 (10-27 @ 22:27)  Anion Gap, Serum: 19 *H* (10-27 @ 16:58)      Calcium, Total Serum: 9.1 (10-28 @ 23:23)  Calcium, Total Serum: 9.6 (10-27 @ 22:27)  Calcium, Total Serum: 9.1 (10-27 @ 16:58)  Albumin, Serum: 2.5 *L* (10-28 @ 23:23)  Albumin, Serum: 2.6 *L* (10-27 @ 22:27)  Albumin, Serum: 2.5 *L* (10-27 @ 16:58)    Alanine Aminotransferase (ALT/SGPT): <5 *L* (10-28 @ 23:23)  Alanine Aminotransferase (ALT/SGPT): <5 *L* (10-27 @ 22:27)  Alanine Aminotransferase (ALT/SGPT): 6 *L* (10-27 @ 16:58)  Alkaline Phosphatase, Serum: 60 (10-28 @ 23:23)  Alkaline Phosphatase, Serum: 65 (10-27 @ 22:27)  Alkaline Phosphatase, Serum: 64 (10-27 @ 16:58)  Aspartate Aminotransferase (AST/SGOT): 21 (10-28 @ 23:23)  Aspartate Aminotransferase (AST/SGOT): 14 (10-27 @ 22:27)  Aspartate Aminotransferase (AST/SGOT): 12 (10-27 @ 16:58)        10-28    132<L>  |  96  |  66<H>  ----------------------------<  146<H>  4.6   |  20<L>  |  3.81<H>    Ca    9.1      28 Oct 2021 23:23  Phos  5.6     10-28  Mg     2.1     10-28    TPro  6.1  /  Alb  2.5<L>  /  TBili  0.8  /  DBili  x   /  AST  21  /  ALT  <5<L>  /  AlkPhos  60  10-28                        7.7    21.96 )-----------( 123      ( 29 Oct 2021 06:13 )             23.4     Medications  MEDICATIONS  (STANDING):  albuterol/ipratropium for Nebulization 3 milliLiter(s) Nebulizer every 6 hours  ascorbic acid 500 milliGRAM(s) Oral daily  atorvastatin 80 milliGRAM(s) Oral at bedtime  calcium acetate 1334 milliGRAM(s) Oral three times a day with meals  chlorhexidine 2% Cloths 1 Application(s) Topical daily  chlorhexidine 4% Liquid 1 Application(s) Topical <User Schedule>  cholecalciferol 1000 Unit(s) Oral daily  dextrose 40% Gel 15 Gram(s) Oral once  dextrose 5%. 1000 milliLiter(s) (50 mL/Hr) IV Continuous <Continuous>  dextrose 5%. 1000 milliLiter(s) (100 mL/Hr) IV Continuous <Continuous>  dextrose 50% Injectable 25 Gram(s) IV Push once  dextrose 50% Injectable 12.5 Gram(s) IV Push once  dextrose 50% Injectable 25 Gram(s) IV Push once  folic acid 1 milliGRAM(s) Oral daily  glucagon  Injectable 1 milliGRAM(s) IntraMuscular once  insulin glargine Injectable (LANTUS) 6 Unit(s) SubCutaneous at bedtime  insulin lispro (ADMELOG) corrective regimen sliding scale   SubCutaneous every 6 hours  levothyroxine 25 MICROGram(s) Oral daily  metoprolol tartrate 25 milliGRAM(s) Oral two times a day  midodrine 10 milliGRAM(s) Oral every 8 hours  mupirocin 2% Ointment 1 Application(s) Both Nostrils two times a day  Nephro-celeste 1 Tablet(s) Oral daily  norepinephrine Infusion 0.05 MICROgram(s)/kG/Min (9.04 mL/Hr) IV Continuous <Continuous>  pantoprazole  Injectable 40 milliGRAM(s) IV Push daily  piperacillin/tazobactam IVPB.. 3.375 Gram(s) IV Intermittent every 12 hours  polyethylene glycol 3350 17 Gram(s) Oral daily  senna 2 Tablet(s) Oral at bedtime  trihexyphenidyl 2 milliGRAM(s) Oral three times a day      Physical Exam  General: Patient comfortable in bed  Vital Signs Last 12 Hrs  T(F): 98 (10-29-21 @ 12:00), Max: 98 (10-29-21 @ 12:00)  HR: 111 (10-29-21 @ 13:00) (57 - 150)  BP: 106/62 (10-29-21 @ 13:00) (73/42 - 215/96)  BP(mean): 79 (10-29-21 @ 13:00) (53 - 138)  RR: 21 (10-29-21 @ 13:00) (14 - 35)  SpO2: 99% (10-29-21 @ 13:00) (72% - 100%)    Diagnostics    Free Thyroxine, Serum: AM Sched. Collection: 26-Oct-2021 06:00 (10-25 @ 11:45)  Thyroid Stimulating Hormone, Serum: AM Sched. Collection: 26-Oct-2021 06:00 (10-25 @ 11:45)  Cortisol AM, Serum: 08:00 (10-22 @ 12:47)  Free Thyroxine, Serum: AM Sched. Collection: 23-Oct-2021 06:00 (10-22 @ 12:43)  Thyroid Stimulating Hormone, Serum: AM Sched. Collection: 23-Oct-2021 06:00 (10-22 @ 12:43)

## 2021-10-30 NOTE — PROGRESS NOTE ADULT - ASSESSMENT
72yo M w/ PMH of CAD (s/p CABG 2019), CKD (unknown stage), DM2, Parkinson's Disease, HTN, depression p/w b/l LE edema found to have ARF and NSTEMI w/ new Aflutter started on heparin gtt and HD. Course c/b RP bleed w/ anemia, admitted to the MICU for closer monitoring while on HD.    # Neuro:  - A&Ox1 from AOx2  - Continue to monitor  - will get EKG after dialysis to evaluate QT to consider starting seroquel    // Parkinson's Dz  - sinemet held, will consider restarting tomorrow    # CV:  // CAD/ NSTEMI  - TTE mod to severe LVS dysfxn, hypokinesis anterior wall  - Holding heparin gtt and ASA given increasing RP bleed  - c/w Atorvastatin  - Unstable for cath at this moment    // Aflutter  - c/w Lopressor 25mg BID  - Monitor for hypotension given RP bleed    # Pulm:  - On 4L NC  - Wean as tolerated    # GI:  - No active issues  - Diet: Renal  - Fluid Restrict to 1.5L    # Renal:  // ABBY on CKD  - s/p shiley placement w/ IR  - c/w Phoslo 1337 tid  - Nephro consulted, recommended HD in AM. Pending patient return from IR    # Endo  // DM2  - A1c 6.4%  - c/w Lantus 6U qHS  - c/w ISS    // Hypothyroid  - c/w Synthroid 25mcg QD    # ID:  - continue vanc and zosyn pending blood cultures    # Heme/Onc:  - Holding AC i/s/o RP bleed    # Skin:  // No active issues    # Ethics   - FULL CODE  70yo M w/ PMH of CAD (s/p CABG 2019), CKD (unknown stage), DM2, Parkinson's Disease, HTN, depression p/w b/l LE edema found to have ARF and NSTEMI w/ new Aflutter started on heparin gtt and HD. Course c/b RP bleed w/ anemia, admitted to the MICU for closer monitoring while on HD.    # Neuro:  - A&Ox1 from AOx2  - Continue to monitor  - will get EKG after dialysis to evaluate QT to consider starting seroquel    // Parkinson's Dz  - sinemet held, will consider restarting tomorrow    # CV:  // CAD/ NSTEMI  - TTE mod to severe LVS dysfxn, hypokinesis anterior wall  - Holding heparin gtt and ASA given increasing RP bleed  - c/w Atorvastatin  - Unstable for cath at this moment    // Aflutter  - c/w Lopressor 25mg BID  - Monitor for hypotension given RP bleed    # Pulm:  - On 4L NC  - Wean as tolerated    # GI:  - No active issues  - Diet: Renal  - Fluid Restrict to 1.5L    # Renal:  // ABBY on CKD  - s/p shiley placement w/ IR  - c/w Phoslo 1337 tid  - Nephro consulted, recommended HD in AM. Pending patient return from IR    # Endo  // DM2  - A1c 6.4%  - c/w Lantus 6U qHS  - c/w ISS    // Hypothyroid  - c/w Synthroid 25mcg QD    # ID:  - continue vanc and zosyn pending blood cultures    # Heme/Onc:  - Holding AC i/s/o RP bleed  - has not responding to blood transfusions, will f/u after 1 u PRBC w/ dialysis. Consider CTA if patient continues to not respond to transfusion.     # Skin:  // No active issues    # Ethics   - FULL CODE

## 2021-10-30 NOTE — PROGRESS NOTE ADULT - ASSESSMENT
72yo M w/ PMHx of CAD (s/p CABG 2019), CKD (unknown stage), DM2, Parkinson's Disease, HTN, depression presents with new onset acute heart failure exacerbation, NSTEMI, started on hep gtt, now with bl RP bleed, acute anemia. transferred to MICU.    # Acute systolic and diastolic heart failure  # NSTEMI  # ABBY on CKD  # Atrial flutter  # acute hypoxic resp failure  # active bleeding left RP hematoma, anemia, shock  TTE mod to severe LV SD, hypokinesis anterior wall  sp shiley placement for HD, HD per renal  not candidate for cath at this point  hep gtt held 2/2 anemia  10/27 active RP bleeding, transferred to MICU, sp multiple transfusions DDAVP  10/28 IR for abd angiography and embolization l4 and l5 lumbar artery  transfuse PRN  on midodrine  empiric abx for leukocytosis  appreciate ICU care    # DM2 (diabetes mellitus, type 2)  per endo  hba1c 6.4    # CAD (coronary artery disease)  c/w atorvastatin  asa on hold    # Parkinsons disease   # delirium  carbidopa/levodopa held  c/w trihexyphenidyl    PCP Dr. Simons

## 2021-10-30 NOTE — PROGRESS NOTE ADULT - ASSESSMENT
Assessment  DMT2: 71y Male with DM T2 with hyperglycemia, A1C 6.4%, was on insulin at home, now on low-dose basal insulin and coverage, blood sugars improving, no hypoglycemias,  appears comfortable in NAD  Hypothyroidism: Patient has no history thyroid disease, was not on any thyroid supplements, subclinical with low-normal FT4, lethargic,  on synthroid 25 mcg po daily.  CHF: on medications, stable, monitored.  HTN: Controlled,  on antihypertensive medications.  Parkinsons: on meds, monitored.  CKD: Monitor labs/BMP      Kelsie Reece MD  Cell: [phone]  Office: [phone]

## 2021-10-30 NOTE — PROGRESS NOTE ADULT - ASSESSMENT
Due for HD today  on nasal cannula  no complaints    acetaminophen     Tablet .. 650 milliGRAM(s) Oral every 6 hours PRN  albuterol/ipratropium for Nebulization 3 milliLiter(s) Nebulizer every 6 hours  ascorbic acid 500 milliGRAM(s) Oral daily  atorvastatin 80 milliGRAM(s) Oral at bedtime  calcium acetate 1334 milliGRAM(s) Oral three times a day with meals  chlorhexidine 2% Cloths 1 Application(s) Topical daily  chlorhexidine 4% Liquid 1 Application(s) Topical <User Schedule>  cholecalciferol 1000 Unit(s) Oral daily  dexMEDEtomidine Infusion 0.75 MICROgram(s)/kG/Hr IV Continuous <Continuous>  dextrose 40% Gel 15 Gram(s) Oral once  dextrose 5%. 1000 milliLiter(s) IV Continuous <Continuous>  dextrose 5%. 1000 milliLiter(s) IV Continuous <Continuous>  dextrose 50% Injectable 25 Gram(s) IV Push once  dextrose 50% Injectable 12.5 Gram(s) IV Push once  dextrose 50% Injectable 25 Gram(s) IV Push once  epoetin mari-epbx (RETACRIT) Injectable 79615 Unit(s) IV Push once  folic acid 1 milliGRAM(s) Oral daily  glucagon  Injectable 1 milliGRAM(s) IntraMuscular once  guaiFENesin Oral Liquid (Sugar-Free) 200 milliGRAM(s) Oral every 6 hours PRN  insulin glargine Injectable (LANTUS) 6 Unit(s) SubCutaneous at bedtime  insulin lispro (ADMELOG) corrective regimen sliding scale   SubCutaneous every 6 hours  levothyroxine 25 MICROGram(s) Oral daily  metoprolol tartrate 25 milliGRAM(s) Oral two times a day  midodrine 20 milliGRAM(s) Oral every 8 hours  mupirocin 2% Ointment 1 Application(s) Both Nostrils two times a day  Nephro-celeste 1 Tablet(s) Oral daily  norepinephrine Infusion 0.05 MICROgram(s)/kG/Min IV Continuous <Continuous>  oxyCODONE    IR 5 milliGRAM(s) Oral every 4 hours PRN  pantoprazole  Injectable 40 milliGRAM(s) IV Push daily  piperacillin/tazobactam IVPB.. 3.375 Gram(s) IV Intermittent every 12 hours  polyethylene glycol 3350 17 Gram(s) Oral daily  senna 2 Tablet(s) Oral at bedtime  sodium chloride 0.9% lock flush 10 milliLiter(s) IV Push every 1 hour PRN  trihexyphenidyl 2 milliGRAM(s) Oral three times a day      VITAL:  T(C): , Max: 37.4 (10-30-21 @ 02:30)  T(F): , Max: 99.3 (10-30-21 @ 02:30)  HR: 111 (10-30-21 @ 11:00)  BP: 99/52 (10-30-21 @ 11:00)  BP(mean): 70 (10-30-21 @ 11:00)  RR: 27 (10-30-21 @ 11:00)  SpO2: 93% (10-30-21 @ 11:00)  Wt(kg): --    10-29-21 @ 07:01  -  10-30-21 @ 07:00  --------------------------------------------------------  IN: 1074.8 mL / OUT: 0 mL / NET: 1074.8 mL    10-30-21 @ 07:01  -  10-30-21 @ 11:47  --------------------------------------------------------  IN: 25 mL / OUT: 0 mL / NET: 25 mL        PHYSICAL EXAM:  Constitutional: confused  Neck:  No JVD  Respiratory: diminished bs b/l  Cardiovascular: S1 and S2  Gastrointestinal: BS+, soft, NT/ mildy distended  Extremities: No peripheral edema  Neurological: A/O x 3, no focal deficits  Psychiatric: Normal mood, normal affect  : No Javier  Skin: No rashes  Access: [facility]     LABS:                          7.0    16.93 )-----------( 191      ( 30 Oct 2021 07:26 )             21.8     Na(132)/K(4.7)/Cl(95)/HCO3(19)/BUN(88)/Cr(5.38)Glu(147)/Ca(9.1)/Mg(2.3)/PO4(6.8)    10-30 @ 00:11  Na(132)/K(4.6)/Cl(96)/HCO3(20)/BUN(66)/Cr(3.81)Glu(146)/Ca(9.1)/Mg(2.1)/PO4(5.6)    10-28 @ 23:23  Na(134)/K(4.8)/Cl(99)/HCO3(18)/BUN(100)/Cr(6.00)Glu(194)/Ca(9.6)/Mg(2.3)/PO4(6.5)    10-27 @ 22:27  Na(135)/K(4.6)/Cl(98)/HCO3(18)/BUN(105)/Cr(5.52)Glu(197)/Ca(9.1)/Mg(--)/PO4(--)    10-27 @ 16:58            ASSESSMENT/PLAN  70yo M w/ PMHx of CAD (s/p CABG 2019), CKD (unknown stage), DM2, Parkinson's Disease, HTN, depression presents with bilateral leg swelling  Mild CHF  Acute on chronic renal failure --getting worse   Hyperphosphatemia  Abd pain   Severe LV dysfucntion     1 IR-  perm cath early next week       2 Renal-   HD  today     3 Hyperphosphatemia -  phoslo  1334 mg TID       4 CVS- soft at present    5 Anemia - hgb stable at 7 s/p 2 units prbcs 10/28    6 GI- Serial CBC at present and Retacrit at HD    Get Delcid NP-BC  [employer]  [phone]

## 2021-10-30 NOTE — PROGRESS NOTE ADULT - SUBJECTIVE AND OBJECTIVE BOX
Follow-up Pulm Progress Note  Brayan Verma MD  [phone]    No new respiratory events overnight.    Appears comfortble on nasal cannula  Hb 7 today: transfusion pending; HD pending    Medications:  Vital Signs Last 24 Hrs  T(C): 36.2 (30 Oct 2021 08:00), Max: 37.4 (30 Oct 2021 02:30)  T(F): 97.2 (30 Oct 2021 08:00), Max: 99.3 (30 Oct 2021 02:30)  HR: 111 (30 Oct 2021 10:00) (55 - 116)  BP: 120/59 (30 Oct 2021 10:00) (78/43 - 160/70)  BP(mean): 83 (30 Oct 2021 10:00) (54 - 102)  RR: 11 (30 Oct 2021 10:00) (11 - 31)  SpO2: 97% (30 Oct 2021 10:00) (91% - 100%)      10-29 @ 07:01  -  10-30 @ 07:00  --------------------------------------------------------  IN: 1074.8 mL / OUT: 0 mL / NET: 1074.8 mL    LABS:                        7.0    16.93 )-----------( 191      ( 30 Oct 2021 07:26 )             21.8     10-30    132<L>  |  95<L>  |  88<H>  ----------------------------<  147<H>  4.7   |  19<L>  |  5.38<H>    Ca    9.1      30 Oct 2021 00:11  Phos  6.8     10-30  Mg     2.3     10-30    TPro  6.2  /  Alb  2.3<L>  /  TBili  0.9  /  DBili  x   /  AST  26  /  ALT  5<L>  /  AlkPhos  63  10-30      PT/INR - ( 30 Oct 2021 02:26 )   PT: 12.6 sec;   INR: 1.05 ratio         PTT - ( 30 Oct 2021 02:26 )  PTT:51.7 sec      Physical Examination:  PULM: Diminished basilar BS  CVS: Regular rate and rhythm, no murmurs, rubs, or gallops  ABD: Soft, non-tender  EXT:  No clubbing, cyanosis, or edema    RADIOLOGY REVIEWED  CXR:    CT chest:    TTE:

## 2021-10-30 NOTE — PROGRESS NOTE ADULT - SUBJECTIVE AND OBJECTIVE BOX
INTERVAL HPI/OVERNIGHT EVENTS:    SUBJECTIVE: Patient seen and examined at bedside.     CONSTITUTIONAL: No weakness, fevers or chills  EYES/ENT: No visual changes;  No vertigo or throat pain   NECK: No pain or stiffness  RESPIRATORY: No cough, wheezing, hemoptysis; No shortness of breath  CARDIOVASCULAR: No chest pain or palpitations  GASTROINTESTINAL: No abdominal or epigastric pain. No nausea, vomiting, or hematemesis; No diarrhea or constipation. No melena or hematochezia.  GENITOURINARY: No dysuria, frequency or hematuria  NEUROLOGICAL: No numbness or weakness  SKIN: No itching, rashes    OBJECTIVE:    VITAL SIGNS:  ICU Vital Signs Last 24 Hrs  T(C): 36.8 (30 Oct 2021 04:00), Max: 37.4 (30 Oct 2021 02:30)  T(F): 98.3 (30 Oct 2021 04:00), Max: 99.3 (30 Oct 2021 02:30)  HR: 108 (30 Oct 2021 07:00) (55 - 150)  BP: 99/56 (30 Oct 2021 07:00) (73/42 - 160/70)  BP(mean): 72 (30 Oct 2021 07:00) (53 - 102)  ABP: --  ABP(mean): --  RR: 22 (30 Oct 2021 07:00) (12 - 35)  SpO2: 95% (30 Oct 2021 07:00) (76% - 100%)        10-29 @ 07:01  -  10-30 @ 07:00  --------------------------------------------------------  IN: 1074.8 mL / OUT: 0 mL / NET: 1074.8 mL      CAPILLARY BLOOD GLUCOSE      POCT Blood Glucose.: 150 mg/dL (30 Oct 2021 05:27)      PHYSICAL EXAM:    General: NAD  HEENT: NC/AT; PERRL, clear conjunctiva  Neck: supple  Respiratory: CTA b/l  Cardiovascular: +S1/S2; RRR  Abdomen: soft, NT/ND; +BS x4  Extremities: WWP, 2+ peripheral pulses b/l; no LE edema  Skin: normal color and turgor; no rash  Neurological:    MEDICATIONS:  MEDICATIONS  (STANDING):  albuterol/ipratropium for Nebulization 3 milliLiter(s) Nebulizer every 6 hours  ascorbic acid 500 milliGRAM(s) Oral daily  atorvastatin 80 milliGRAM(s) Oral at bedtime  calcium acetate 1334 milliGRAM(s) Oral three times a day with meals  chlorhexidine 2% Cloths 1 Application(s) Topical daily  chlorhexidine 4% Liquid 1 Application(s) Topical <User Schedule>  cholecalciferol 1000 Unit(s) Oral daily  dexMEDEtomidine Infusion 0.75 MICROgram(s)/kG/Hr (18.1 mL/Hr) IV Continuous <Continuous>  dextrose 40% Gel 15 Gram(s) Oral once  dextrose 5%. 1000 milliLiter(s) (50 mL/Hr) IV Continuous <Continuous>  dextrose 5%. 1000 milliLiter(s) (100 mL/Hr) IV Continuous <Continuous>  dextrose 50% Injectable 25 Gram(s) IV Push once  dextrose 50% Injectable 12.5 Gram(s) IV Push once  dextrose 50% Injectable 25 Gram(s) IV Push once  epoetin mari-epbx (RETACRIT) Injectable 97478 Unit(s) IV Push once  folic acid 1 milliGRAM(s) Oral daily  glucagon  Injectable 1 milliGRAM(s) IntraMuscular once  insulin glargine Injectable (LANTUS) 6 Unit(s) SubCutaneous at bedtime  insulin lispro (ADMELOG) corrective regimen sliding scale   SubCutaneous every 6 hours  levothyroxine 25 MICROGram(s) Oral daily  metoprolol tartrate 25 milliGRAM(s) Oral two times a day  midodrine 20 milliGRAM(s) Oral every 8 hours  mupirocin 2% Ointment 1 Application(s) Both Nostrils two times a day  Nephro-celeste 1 Tablet(s) Oral daily  norepinephrine Infusion 0.05 MICROgram(s)/kG/Min (9.04 mL/Hr) IV Continuous <Continuous>  pantoprazole  Injectable 40 milliGRAM(s) IV Push daily  piperacillin/tazobactam IVPB.. 3.375 Gram(s) IV Intermittent every 12 hours  polyethylene glycol 3350 17 Gram(s) Oral daily  senna 2 Tablet(s) Oral at bedtime  trihexyphenidyl 2 milliGRAM(s) Oral three times a day  vancomycin  IVPB 1000 milliGRAM(s) IV Intermittent once    MEDICATIONS  (PRN):  acetaminophen     Tablet .. 650 milliGRAM(s) Oral every 6 hours PRN Mild Pain (1 - 3)  guaiFENesin Oral Liquid (Sugar-Free) 200 milliGRAM(s) Oral every 6 hours PRN Cough  oxyCODONE    IR 5 milliGRAM(s) Oral every 4 hours PRN Severe Pain (7 - 10)  sodium chloride 0.9% lock flush 10 milliLiter(s) IV Push every 1 hour PRN Pre/post blood products, medications, blood draw, and to maintain line patency      ALLERGIES:  Allergies    adhesives (Rash)  latex (Urticaria)  No Known Drug Allergies    Intolerances        LABS:                        7.0    19.99 )-----------( 244      ( 30 Oct 2021 00:11 )             21.2     10-30    132<L>  |  95<L>  |  88<H>  ----------------------------<  147<H>  4.7   |  19<L>  |  5.38<H>    Ca    9.1      30 Oct 2021 00:11  Phos  6.8     10-30  Mg     2.3     10-30    TPro  6.2  /  Alb  2.3<L>  /  TBili  0.9  /  DBili  x   /  AST  26  /  ALT  5<L>  /  AlkPhos  63  10-30    PT/INR - ( 30 Oct 2021 02:26 )   PT: 12.6 sec;   INR: 1.05 ratio         PTT - ( 30 Oct 2021 02:26 )  PTT:51.7 sec      RADIOLOGY & ADDITIONAL TESTS: Reviewed. INTERVAL HPI/OVERNIGHT EVENTS:    SUBJECTIVE: Patient seen and examined at bedside. LUC. Patient now AOx1.     CONSTITUTIONAL: No weakness, fevers or chills  EYES/ENT: No visual changes;  No vertigo or throat pain   NECK: No pain or stiffness  RESPIRATORY: No cough, wheezing, hemoptysis; No shortness of breath  CARDIOVASCULAR: No chest pain or palpitations  GASTROINTESTINAL: no abdominal pain. No nausea, vomiting, or hematemesis; No diarrhea or constipation. No melena or hematochezia.  GENITOURINARY: No dysuria, frequency or hemautira  SKIN: No itching, rashes    OBJECTIVE:    VITAL SIGNS:  ICU Vital Signs Last 24 Hrs  T(C): 36.8 (30 Oct 2021 04:00), Max: 37.4 (30 Oct 2021 02:30)  T(F): 98.3 (30 Oct 2021 04:00), Max: 99.3 (30 Oct 2021 02:30)  HR: 108 (30 Oct 2021 07:00) (55 - 150)  BP: 99/56 (30 Oct 2021 07:00) (73/42 - 160/70)  BP(mean): 72 (30 Oct 2021 07:00) (53 - 102)  ABP: --  ABP(mean): --  RR: 22 (30 Oct 2021 07:00) (12 - 35)  SpO2: 95% (30 Oct 2021 07:00) (76% - 100%)        10-29 @ 07:01  -  10-30 @ 07:00  --------------------------------------------------------  IN: 1074.8 mL / OUT: 0 mL / NET: 1074.8 mL      CAPILLARY BLOOD GLUCOSE      POCT Blood Glucose.: 150 mg/dL (30 Oct 2021 05:27)      PHYSICAL EXAM:  GENERAL: NAD, AAOx1  HEAD:  Atraumatic, Normocephalic  CHEST/LUNG: CTABL, No wheezes B/L  HEART: RRR  ABDOMEN: Soft, Nontender, Nondistended; Bowel sounds present  EXTREMITIES:  2+ Peripheral Pulses, No clubbing, cyanosis, or edema  NEURO: No focal deficits    MEDICATIONS:  MEDICATIONS  (STANDING):  albuterol/ipratropium for Nebulization 3 milliLiter(s) Nebulizer every 6 hours  ascorbic acid 500 milliGRAM(s) Oral daily  atorvastatin 80 milliGRAM(s) Oral at bedtime  calcium acetate 1334 milliGRAM(s) Oral three times a day with meals  chlorhexidine 2% Cloths 1 Application(s) Topical daily  chlorhexidine 4% Liquid 1 Application(s) Topical <User Schedule>  cholecalciferol 1000 Unit(s) Oral daily  dexMEDEtomidine Infusion 0.75 MICROgram(s)/kG/Hr (18.1 mL/Hr) IV Continuous <Continuous>  dextrose 40% Gel 15 Gram(s) Oral once  dextrose 5%. 1000 milliLiter(s) (50 mL/Hr) IV Continuous <Continuous>  dextrose 5%. 1000 milliLiter(s) (100 mL/Hr) IV Continuous <Continuous>  dextrose 50% Injectable 25 Gram(s) IV Push once  dextrose 50% Injectable 12.5 Gram(s) IV Push once  dextrose 50% Injectable 25 Gram(s) IV Push once  epoetin mari-epbx (RETACRIT) Injectable 82346 Unit(s) IV Push once  folic acid 1 milliGRAM(s) Oral daily  glucagon  Injectable 1 milliGRAM(s) IntraMuscular once  insulin glargine Injectable (LANTUS) 6 Unit(s) SubCutaneous at bedtime  insulin lispro (ADMELOG) corrective regimen sliding scale   SubCutaneous every 6 hours  levothyroxine 25 MICROGram(s) Oral daily  metoprolol tartrate 25 milliGRAM(s) Oral two times a day  midodrine 20 milliGRAM(s) Oral every 8 hours  mupirocin 2% Ointment 1 Application(s) Both Nostrils two times a day  Nephro-celeste 1 Tablet(s) Oral daily  norepinephrine Infusion 0.05 MICROgram(s)/kG/Min (9.04 mL/Hr) IV Continuous <Continuous>  pantoprazole  Injectable 40 milliGRAM(s) IV Push daily  piperacillin/tazobactam IVPB.. 3.375 Gram(s) IV Intermittent every 12 hours  polyethylene glycol 3350 17 Gram(s) Oral daily  senna 2 Tablet(s) Oral at bedtime  trihexyphenidyl 2 milliGRAM(s) Oral three times a day  vancomycin  IVPB 1000 milliGRAM(s) IV Intermittent once    MEDICATIONS  (PRN):  acetaminophen     Tablet .. 650 milliGRAM(s) Oral every 6 hours PRN Mild Pain (1 - 3)  guaiFENesin Oral Liquid (Sugar-Free) 200 milliGRAM(s) Oral every 6 hours PRN Cough  oxyCODONE    IR 5 milliGRAM(s) Oral every 4 hours PRN Severe Pain (7 - 10)  sodium chloride 0.9% lock flush 10 milliLiter(s) IV Push every 1 hour PRN Pre/post blood products, medications, blood draw, and to maintain line patency      ALLERGIES:  Allergies    adhesives (Rash)  latex (Urticaria)  No Known Drug Allergies    Intolerances        LABS:                        7.0    19.99 )-----------( 244      ( 30 Oct 2021 00:11 )             21.2     10-30    132<L>  |  95<L>  |  88<H>  ----------------------------<  147<H>  4.7   |  19<L>  |  5.38<H>    Ca    9.1      30 Oct 2021 00:11  Phos  6.8     10-30  Mg     2.3     10-30    TPro  6.2  /  Alb  2.3<L>  /  TBili  0.9  /  DBili  x   /  AST  26  /  ALT  5<L>  /  AlkPhos  63  10-30    PT/INR - ( 30 Oct 2021 02:26 )   PT: 12.6 sec;   INR: 1.05 ratio         PTT - ( 30 Oct 2021 02:26 )  PTT:51.7 sec      RADIOLOGY & ADDITIONAL TESTS: Reviewed.

## 2021-10-30 NOTE — PROGRESS NOTE ADULT - SUBJECTIVE AND OBJECTIVE BOX
Patient is a 71y old  Male who presents with a chief complaint of leg swelling (30 Oct 2021 09:39)      SUBJECTIVE / OVERNIGHT EVENTS:    Patient seen and examined. confused. 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 co abd pain.      Vital Signs Last 24 Hrs  T(C): 36.2 (30 Oct 2021 08:00), Max: 37.4 (30 Oct 2021 02:30)  T(F): 97.2 (30 Oct 2021 08:00), Max: 99.3 (30 Oct 2021 02:30)  HR: 111 (30 Oct 2021 10:00) (55 - 150)  BP: 120/59 (30 Oct 2021 10:00) (77/41 - 160/70)  BP(mean): 83 (30 Oct 2021 10:00) (54 - 102)  RR: 11 (30 Oct 2021 10:00) (11 - 35)  SpO2: 97% (30 Oct 2021 10:00) (76% - 100%)  I&O's Summary    29 Oct 2021 07:01  -  30 Oct 2021 07:00  --------------------------------------------------------  IN: 1074.8 mL / OUT: 0 mL / NET: 1074.8 mL    30 Oct 2021 07:01  -  30 Oct 2021 10:23  --------------------------------------------------------  IN: 25 mL / OUT: 0 mL / NET: 25 mL        PE:  GENERAL: NAD, AAOx1 self   HEAD:  Atraumatic, Normocephalic  CHEST/LUNG: CTABL, No wheeze  HEART: Regular rate and rhythm; no murmur  ABDOMEN: Soft, midly tender, distended; Bowel sounds present  EXTREMITIES:  2+ Peripheral Pulses, No clubbing, cyanosis, or edema  skin right IJ shiley  NEURO: confused    LABS:                        7.0    16.93 )-----------( 191      ( 30 Oct 2021 07:26 )             21.8     10-30    132<L>  |  95<L>  |  88<H>  ----------------------------<  147<H>  4.7   |  19<L>  |  5.38<H>    Ca    9.1      30 Oct 2021 00:11  Phos  6.8     10-30  Mg     2.3     10-30    TPro  6.2  /  Alb  2.3<L>  /  TBili  0.9  /  DBili  x   /  AST  26  /  ALT  5<L>  /  AlkPhos  63  10-30    PT/INR - ( 30 Oct 2021 02:26 )   PT: 12.6 sec;   INR: 1.05 ratio         PTT - ( 30 Oct 2021 02:26 )  PTT:51.7 sec  CAPILLARY BLOOD GLUCOSE      POCT Blood Glucose.: 150 mg/dL (30 Oct 2021 05:27)  POCT Blood Glucose.: 152 mg/dL (29 Oct 2021 17:05)  POCT Blood Glucose.: 138 mg/dL (29 Oct 2021 11:21)            RADIOLOGY & ADDITIONAL TESTS:    Imaging Personally Reviewed:  [x] YES  [ ] NO    Consultant(s) Notes Reviewed:  [x] YES  [ ] NO    MEDICATIONS  (STANDING):  albuterol/ipratropium for Nebulization 3 milliLiter(s) Nebulizer every 6 hours  ascorbic acid 500 milliGRAM(s) Oral daily  atorvastatin 80 milliGRAM(s) Oral at bedtime  calcium acetate 1334 milliGRAM(s) Oral three times a day with meals  chlorhexidine 2% Cloths 1 Application(s) Topical daily  chlorhexidine 4% Liquid 1 Application(s) Topical <User Schedule>  cholecalciferol 1000 Unit(s) Oral daily  dexMEDEtomidine Infusion 0.75 MICROgram(s)/kG/Hr (18.1 mL/Hr) IV Continuous <Continuous>  dextrose 40% Gel 15 Gram(s) Oral once  dextrose 5%. 1000 milliLiter(s) (50 mL/Hr) IV Continuous <Continuous>  dextrose 5%. 1000 milliLiter(s) (100 mL/Hr) IV Continuous <Continuous>  dextrose 50% Injectable 25 Gram(s) IV Push once  dextrose 50% Injectable 12.5 Gram(s) IV Push once  dextrose 50% Injectable 25 Gram(s) IV Push once  epoetin mari-epbx (RETACRIT) Injectable 07526 Unit(s) IV Push once  folic acid 1 milliGRAM(s) Oral daily  glucagon  Injectable 1 milliGRAM(s) IntraMuscular once  insulin glargine Injectable (LANTUS) 6 Unit(s) SubCutaneous at bedtime  insulin lispro (ADMELOG) corrective regimen sliding scale   SubCutaneous every 6 hours  levothyroxine 25 MICROGram(s) Oral daily  metoprolol tartrate 25 milliGRAM(s) Oral two times a day  midodrine 20 milliGRAM(s) Oral every 8 hours  mupirocin 2% Ointment 1 Application(s) Both Nostrils two times a day  Nephro-celeste 1 Tablet(s) Oral daily  norepinephrine Infusion 0.05 MICROgram(s)/kG/Min (9.04 mL/Hr) IV Continuous <Continuous>  pantoprazole  Injectable 40 milliGRAM(s) IV Push daily  piperacillin/tazobactam IVPB.. 3.375 Gram(s) IV Intermittent every 12 hours  polyethylene glycol 3350 17 Gram(s) Oral daily  senna 2 Tablet(s) Oral at bedtime  trihexyphenidyl 2 milliGRAM(s) Oral three times a day    MEDICATIONS  (PRN):  acetaminophen     Tablet .. 650 milliGRAM(s) Oral every 6 hours PRN Mild Pain (1 - 3)  guaiFENesin Oral Liquid (Sugar-Free) 200 milliGRAM(s) Oral every 6 hours PRN Cough  oxyCODONE    IR 5 milliGRAM(s) Oral every 4 hours PRN Severe Pain (7 - 10)  sodium chloride 0.9% lock flush 10 milliLiter(s) IV Push every 1 hour PRN Pre/post blood products, medications, blood draw, and to maintain line patency      Care Discussed with Consultants/Other Providers [x] YES  [ ] NO    HEALTH ISSUES - PROBLEM Dx:  Acute heart failure    Atrial flutter    DM2 (diabetes mellitus, type 2)    CAD (coronary artery disease)    Parkinsons disease    Acute on chronic renal failure    NSTEMI (non-ST elevation myocardial infarction)    Hypertension    Acute kidney injury superimposed on CKD    Anemia    Hypothyroidism

## 2021-10-30 NOTE — PROGRESS NOTE ADULT - ASSESSMENT
71 M w volume overload, GI consulted for drop in hgb    1. Volume overload per cardio    2. ABBY on CKD    3. Drop in hgb, no overt GI Bleed.     4. RP bleed  s/p Abdominal Angiography and Embolization on 10/29/2021 by Interventional radiology of l4and l5 lumbar artery  - h/h overall stable    5. abdominal distention  - AXR nonobstructive, give miralax BID    The plan of care was discussed with the physician assistant and modifications were made to the notation where appropriate.   Differential diagnosis and plan of care discussed with patient after the evaluation  35 minutes spent on total encounter of which more than fifty percent of the encounter was spent counseling and/or coordinating care by the attending physician.    [facility]  Gastroenterology and Hepatology  [address] [place], [place]  Office: [phone]  Cell: [phone]

## 2021-10-30 NOTE — PROGRESS NOTE ADULT - ATTENDING COMMENTS
1. RP bleed bilaterally. L RP bleed embolized by IR. H/H slowly drifting down. Will give additional PRBC at HD today. If H/H further decreases. Will need to repeat CTA.  2.NSTEMI and aflutter. Initially placed on heparin and antiplatelet medication. All stopped for RP bleed. HR  50-60s. on lopressor.  3.ABBY from ATN . Now HD Pt  becomes hypotensive on HD and needs pressors intermittently.  4. Delirium: Multifactorial. Fever, hypotension, ICU. Pt now in  room with windows. Continue Precedex. Avoid ativan and versed. QTC elevated. Repeat after HD. If QTC WNL then can try haldol prn. Sinemet on hold since yesterday.  5.Parkinson's disease. Sinemet stopped. if no improvement with delirium tomorrow will restart Sinemet.  6. Fevers with increased wbc. Awaiting culture results. Started on vancomycin and zosyn.

## 2021-10-30 NOTE — PROGRESS NOTE ADULT - ASSESSMENT
ABBY now on HD  Acute on chronic systolic CHF with fluid overload  Bilateral L>>R pleural effusions  S/P RP bleed on AC   Atrial Flutter/Fib    REC    HD per renal  Consider L thorocentesis  Negative balance as tolerated once bleeding ceased

## 2021-10-30 NOTE — PROGRESS NOTE ADULT - SUBJECTIVE AND OBJECTIVE BOX
Chief complaint    Patient is a 71y old  Male who presents with a chief complaint of leg swelling (30 Oct 2021 08:00)   Review of systems  Patient in bed, appears comfortable.    Labs and Fingersticks  CAPILLARY BLOOD GLUCOSE      POCT Blood Glucose.: 150 mg/dL (30 Oct 2021 05:27)  POCT Blood Glucose.: 152 mg/dL (29 Oct 2021 17:05)  POCT Blood Glucose.: 138 mg/dL (29 Oct 2021 11:21)      Anion Gap, Serum: 18 *H* (10-30 @ 00:11)  Anion Gap, Serum: 16 (10-28 @ 23:23)      Calcium, Total Serum: 9.1 (10-30 @ 00:11)  Calcium, Total Serum: 9.1 (10-28 @ 23:23)  Albumin, Serum: 2.3 *L* (10-30 @ 00:11)  Albumin, Serum: 2.5 *L* (10-28 @ 23:23)    Alanine Aminotransferase (ALT/SGPT): 5 *L* (10-30 @ 00:11)  Alanine Aminotransferase (ALT/SGPT): <5 *L* (10-28 @ 23:23)  Alkaline Phosphatase, Serum: 63 (10-30 @ 00:11)  Alkaline Phosphatase, Serum: 60 (10-28 @ 23:23)  Aspartate Aminotransferase (AST/SGOT): 26 (10-30 @ 00:11)  Aspartate Aminotransferase (AST/SGOT): 21 (10-28 @ 23:23)        10-30    132<L>  |  95<L>  |  88<H>  ----------------------------<  147<H>  4.7   |  19<L>  |  5.38<H>    Ca    9.1      30 Oct 2021 00:11  Phos  6.8     10-30  Mg     2.3     10-30    TPro  6.2  /  Alb  2.3<L>  /  TBili  0.9  /  DBili  x   /  AST  26  /  ALT  5<L>  /  AlkPhos  63  10-30                        7.0    16.93 )-----------( 191      ( 30 Oct 2021 07:26 )             21.8     Medications  MEDICATIONS  (STANDING):  albuterol/ipratropium for Nebulization 3 milliLiter(s) Nebulizer every 6 hours  ascorbic acid 500 milliGRAM(s) Oral daily  atorvastatin 80 milliGRAM(s) Oral at bedtime  calcium acetate 1334 milliGRAM(s) Oral three times a day with meals  chlorhexidine 2% Cloths 1 Application(s) Topical daily  chlorhexidine 4% Liquid 1 Application(s) Topical <User Schedule>  cholecalciferol 1000 Unit(s) Oral daily  dexMEDEtomidine Infusion 0.75 MICROgram(s)/kG/Hr (18.1 mL/Hr) IV Continuous <Continuous>  dextrose 40% Gel 15 Gram(s) Oral once  dextrose 5%. 1000 milliLiter(s) (50 mL/Hr) IV Continuous <Continuous>  dextrose 5%. 1000 milliLiter(s) (100 mL/Hr) IV Continuous <Continuous>  dextrose 50% Injectable 25 Gram(s) IV Push once  dextrose 50% Injectable 12.5 Gram(s) IV Push once  dextrose 50% Injectable 25 Gram(s) IV Push once  epoetin mari-epbx (RETACRIT) Injectable 57756 Unit(s) IV Push once  folic acid 1 milliGRAM(s) Oral daily  glucagon  Injectable 1 milliGRAM(s) IntraMuscular once  insulin glargine Injectable (LANTUS) 6 Unit(s) SubCutaneous at bedtime  insulin lispro (ADMELOG) corrective regimen sliding scale   SubCutaneous every 6 hours  levothyroxine 25 MICROGram(s) Oral daily  metoprolol tartrate 25 milliGRAM(s) Oral two times a day  midodrine 20 milliGRAM(s) Oral every 8 hours  mupirocin 2% Ointment 1 Application(s) Both Nostrils two times a day  Nephro-celeste 1 Tablet(s) Oral daily  norepinephrine Infusion 0.05 MICROgram(s)/kG/Min (9.04 mL/Hr) IV Continuous <Continuous>  pantoprazole  Injectable 40 milliGRAM(s) IV Push daily  piperacillin/tazobactam IVPB.. 3.375 Gram(s) IV Intermittent every 12 hours  polyethylene glycol 3350 17 Gram(s) Oral daily  senna 2 Tablet(s) Oral at bedtime  trihexyphenidyl 2 milliGRAM(s) Oral three times a day  vancomycin  IVPB 1000 milliGRAM(s) IV Intermittent once      Physical Exam  General: Patient comfortable in bed  Vital Signs Last 12 Hrs  T(F): 97.2 (10-30-21 @ 08:00), Max: 99.3 (10-30-21 @ 02:30)  HR: 110 (10-30-21 @ 09:00) (55 - 115)  BP: 107/59 (10-30-21 @ 09:00) (78/43 - 160/70)  BP(mean): 79 (10-30-21 @ 09:00) (54 - 101)  RR: 26 (10-30-21 @ 09:00) (12 - 26)  SpO2: 98% (10-30-21 @ 09:00) (94% - 100%)  Neck: No palpable thyroid nodules.  CVS: S1S2, No murmurs  Respiratory: No wheezing, no crepitations  GI: Abdomen soft, bowel sounds positive  Musculoskeletal:  edema lower extremities.     Diagnostics

## 2021-10-31 NOTE — PROGRESS NOTE ADULT - SUBJECTIVE AND OBJECTIVE BOX
Patient is a 71y old  Male who presents with a chief complaint of leg swelling (31 Oct 2021 07:41)      SUBJECTIVE / OVERNIGHT EVENTS:    Patient seen and examined. on precedex. cannot provide ROS 2/2 sedated. sp NGT.      Vital Signs Last 24 Hrs  T(C): 36.4 (31 Oct 2021 08:00), Max: 36.8 (30 Oct 2021 16:35)  T(F): 97.5 (31 Oct 2021 08:00), Max: 98.3 (30 Oct 2021 16:35)  HR: 52 (31 Oct 2021 09:00) (52 - 111)  BP: 134/63 (31 Oct 2021 09:00) (62/45 - 141/64)  BP(mean): 90 (31 Oct 2021 09:00) (44 - 92)  RR: 11 (31 Oct 2021 09:00) (10 - 37)  SpO2: 100% (31 Oct 2021 09:00) (87% - 100%)  I&O's Summary    30 Oct 2021 07:01  -  31 Oct 2021 07:00  --------------------------------------------------------  IN: 892.4 mL / OUT: 1000 mL / NET: -107.6 mL    31 Oct 2021 07:01  -  31 Oct 2021 09:43  --------------------------------------------------------  IN: 174.2 mL / OUT: 0 mL / NET: 174.2 mL        PE:  GENERAL: NAD sedated  HEAD:  Atraumatic, Normocephalic  CHEST/LUNG: CTABL, No wheeze  HEART: Regular rate and rhythm; no murmur  ABDOMEN: Soft, Nontender, distended; Bowel sounds present, ngt  EXTREMITIES:  +1 le edema, right ankle wounds  skin: right neck shiley  NEURO: sedated    LABS:                        7.8    16.66 )-----------( 155      ( 31 Oct 2021 01:00 )             24.2     10-31    133<L>  |  97  |  56<H>  ----------------------------<  139<H>  4.2   |  22  |  4.19<H>    Ca    8.9      31 Oct 2021 01:00  Phos  5.1     10-31  Mg     2.2     10-31    TPro  6.1  /  Alb  2.5<L>  /  TBili  1.4<H>  /  DBili  x   /  AST  26  /  ALT  6<L>  /  AlkPhos  62  10-31    PT/INR - ( 31 Oct 2021 01:00 )   PT: 12.8 sec;   INR: 1.07 ratio         PTT - ( 31 Oct 2021 01:00 )  PTT:49.9 sec  CAPILLARY BLOOD GLUCOSE      POCT Blood Glucose.: 147 mg/dL (31 Oct 2021 05:38)  POCT Blood Glucose.: 141 mg/dL (30 Oct 2021 17:27)  POCT Blood Glucose.: 168 mg/dL (30 Oct 2021 12:00)            RADIOLOGY & ADDITIONAL TESTS:    Imaging Personally Reviewed:  [x] YES  [ ] NO    Consultant(s) Notes Reviewed:  [x] YES  [ ] NO    MEDICATIONS  (STANDING):  ascorbic acid 500 milliGRAM(s) Oral daily  atorvastatin 80 milliGRAM(s) Oral at bedtime  chlorhexidine 2% Cloths 1 Application(s) Topical daily  chlorhexidine 4% Liquid 1 Application(s) Topical <User Schedule>  cholecalciferol 1000 Unit(s) Oral daily  dexMEDEtomidine Infusion 0.75 MICROgram(s)/kG/Hr (18.1 mL/Hr) IV Continuous <Continuous>  dextrose 40% Gel 15 Gram(s) Oral once  dextrose 5%. 1000 milliLiter(s) (50 mL/Hr) IV Continuous <Continuous>  dextrose 5%. 1000 milliLiter(s) (100 mL/Hr) IV Continuous <Continuous>  dextrose 50% Injectable 25 Gram(s) IV Push once  dextrose 50% Injectable 12.5 Gram(s) IV Push once  dextrose 50% Injectable 25 Gram(s) IV Push once  folic acid 1 milliGRAM(s) Oral daily  glucagon  Injectable 1 milliGRAM(s) IntraMuscular once  insulin glargine Injectable (LANTUS) 6 Unit(s) SubCutaneous at bedtime  insulin lispro (ADMELOG) corrective regimen sliding scale   SubCutaneous every 6 hours  levothyroxine 25 MICROGram(s) Oral daily  metoprolol tartrate 25 milliGRAM(s) Oral two times a day  midodrine 20 milliGRAM(s) Oral every 8 hours  Nephro-celeste 1 Tablet(s) Oral daily  norepinephrine Infusion 0.05 MICROgram(s)/kG/Min (9.04 mL/Hr) IV Continuous <Continuous>  pantoprazole  Injectable 40 milliGRAM(s) IV Push daily  piperacillin/tazobactam IVPB.. 3.375 Gram(s) IV Intermittent every 12 hours  polyethylene glycol 3350 17 Gram(s) Oral two times a day  senna 2 Tablet(s) Oral at bedtime  sevelamer carbonate Powder 1600 milliGRAM(s) Oral three times a day with meals  trihexyphenidyl 2 milliGRAM(s) Oral three times a day    MEDICATIONS  (PRN):  acetaminophen     Tablet .. 650 milliGRAM(s) Oral every 6 hours PRN Mild Pain (1 - 3)  albuterol/ipratropium for Nebulization 3 milliLiter(s) Nebulizer every 6 hours PRN Shortness of Breath and/or Wheezing  guaiFENesin Oral Liquid (Sugar-Free) 200 milliGRAM(s) Oral every 6 hours PRN Cough  oxyCODONE    IR 5 milliGRAM(s) Oral every 4 hours PRN Severe Pain (7 - 10)  sodium chloride 0.9% lock flush 10 milliLiter(s) IV Push every 1 hour PRN Pre/post blood products, medications, blood draw, and to maintain line patency      Care Discussed with Consultants/Other Providers [x] YES  [ ] NO    HEALTH ISSUES - PROBLEM Dx:  Acute heart failure    Atrial flutter    DM2 (diabetes mellitus, type 2)    CAD (coronary artery disease)    Parkinsons disease    Acute on chronic renal failure    NSTEMI (non-ST elevation myocardial infarction)    Hypertension    Acute kidney injury superimposed on CKD    Anemia    Hypothyroidism

## 2021-10-31 NOTE — PROGRESS NOTE ADULT - SUBJECTIVE AND OBJECTIVE BOX
INTERVAL HPI/OVERNIGHT EVENTS:  - No acute events overnight  - Repeat CBC, stable Hb at 7.8    SUBJECTIVE: Patient seen and examined at bedside.     OBJECTIVE:    VITAL SIGNS:  ICU Vital Signs Last 24 Hrs  T(C): 36.2 (31 Oct 2021 04:00), Max: 36.8 (30 Oct 2021 16:35)  T(F): 97.2 (31 Oct 2021 04:00), Max: 98.3 (30 Oct 2021 16:35)  HR: 52 (31 Oct 2021 07:00) (52 - 111)  BP: 104/57 (31 Oct 2021 07:00) (62/45 - 141/64)  BP(mean): 74 (31 Oct 2021 07:00) (44 - 92)  ABP: --  ABP(mean): --  RR: 12 (31 Oct 2021 07:00) (10 - 37)  SpO2: 100% (31 Oct 2021 07:00) (87% - 100%)        10-30 @ 07:01  -  10-31 @ 07:00  --------------------------------------------------------  IN: 892.4 mL / OUT: 1000 mL / NET: -107.6 mL      CAPILLARY BLOOD GLUCOSE      POCT Blood Glucose.: 147 mg/dL (31 Oct 2021 05:38)        PHYSICAL EXAM:  GENERAL: NAD, AAOx1  HEAD:  Atraumatic, Normocephalic  CHEST/LUNG: CTABL, No wheezes B/L  HEART: RRR  ABDOMEN: Soft, Nontender, Nondistended; Bowel sounds present  EXTREMITIES:  2+ Peripheral Pulses, No clubbing, cyanosis, or edema  NEURO: No focal deficits      MEDICATIONS:  MEDICATIONS  (STANDING):  ascorbic acid 500 milliGRAM(s) Oral daily  atorvastatin 80 milliGRAM(s) Oral at bedtime  chlorhexidine 2% Cloths 1 Application(s) Topical daily  chlorhexidine 4% Liquid 1 Application(s) Topical <User Schedule>  cholecalciferol 1000 Unit(s) Oral daily  dexMEDEtomidine Infusion 0.75 MICROgram(s)/kG/Hr (18.1 mL/Hr) IV Continuous <Continuous>  dextrose 40% Gel 15 Gram(s) Oral once  dextrose 5%. 1000 milliLiter(s) (50 mL/Hr) IV Continuous <Continuous>  dextrose 5%. 1000 milliLiter(s) (100 mL/Hr) IV Continuous <Continuous>  dextrose 50% Injectable 25 Gram(s) IV Push once  dextrose 50% Injectable 12.5 Gram(s) IV Push once  dextrose 50% Injectable 25 Gram(s) IV Push once  folic acid 1 milliGRAM(s) Oral daily  glucagon  Injectable 1 milliGRAM(s) IntraMuscular once  insulin glargine Injectable (LANTUS) 6 Unit(s) SubCutaneous at bedtime  insulin lispro (ADMELOG) corrective regimen sliding scale   SubCutaneous every 6 hours  levothyroxine 25 MICROGram(s) Oral daily  metoprolol tartrate 25 milliGRAM(s) Oral two times a day  midodrine 20 milliGRAM(s) Oral every 8 hours  Nephro-celeste 1 Tablet(s) Oral daily  norepinephrine Infusion 0.05 MICROgram(s)/kG/Min (9.04 mL/Hr) IV Continuous <Continuous>  pantoprazole  Injectable 40 milliGRAM(s) IV Push daily  piperacillin/tazobactam IVPB.. 3.375 Gram(s) IV Intermittent every 12 hours  polyethylene glycol 3350 17 Gram(s) Oral two times a day  senna 2 Tablet(s) Oral at bedtime  sevelamer carbonate Powder 1600 milliGRAM(s) Oral three times a day with meals  trihexyphenidyl 2 milliGRAM(s) Oral three times a day  vancomycin  IVPB 1000 milliGRAM(s) IV Intermittent once    MEDICATIONS  (PRN):  acetaminophen     Tablet .. 650 milliGRAM(s) Oral every 6 hours PRN Mild Pain (1 - 3)  albuterol/ipratropium for Nebulization 3 milliLiter(s) Nebulizer every 6 hours PRN Shortness of Breath and/or Wheezing  guaiFENesin Oral Liquid (Sugar-Free) 200 milliGRAM(s) Oral every 6 hours PRN Cough  oxyCODONE    IR 5 milliGRAM(s) Oral every 4 hours PRN Severe Pain (7 - 10)  sodium chloride 0.9% lock flush 10 milliLiter(s) IV Push every 1 hour PRN Pre/post blood products, medications, blood draw, and to maintain line patency      ALLERGIES:  Allergies    adhesives (Rash)  latex (Urticaria)  No Known Drug Allergies    Intolerances        LABS:                        7.8    16.66 )-----------( 155      ( 31 Oct 2021 01:00 )             24.2     10-31    133<L>  |  97  |  56<H>  ----------------------------<  139<H>  4.2   |  22  |  4.19<H>    Ca    8.9      31 Oct 2021 01:00  Phos  5.1     10-31  Mg     2.2     10-31    TPro  6.1  /  Alb  2.5<L>  /  TBili  1.4<H>  /  DBili  x   /  AST  26  /  ALT  6<L>  /  AlkPhos  62  10-31    PT/INR - ( 31 Oct 2021 01:00 )   PT: 12.8 sec;   INR: 1.07 ratio         PTT - ( 31 Oct 2021 01:00 )  PTT:49.9 sec      RADIOLOGY & ADDITIONAL TESTS: Reviewed.     INTERVAL HPI/OVERNIGHT EVENTS:  - No acute events overnight  - Repeat CBC, stable Hb at 7.8  - Restart home sinemet  - Start tube feeds    SUBJECTIVE: Patient seen and examined at bedside.     OBJECTIVE:    VITAL SIGNS:  ICU Vital Signs Last 24 Hrs  T(C): 36.2 (31 Oct 2021 04:00), Max: 36.8 (30 Oct 2021 16:35)  T(F): 97.2 (31 Oct 2021 04:00), Max: 98.3 (30 Oct 2021 16:35)  HR: 52 (31 Oct 2021 07:00) (52 - 111)  BP: 104/57 (31 Oct 2021 07:00) (62/45 - 141/64)  BP(mean): 74 (31 Oct 2021 07:00) (44 - 92)  ABP: --  ABP(mean): --  RR: 12 (31 Oct 2021 07:00) (10 - 37)  SpO2: 100% (31 Oct 2021 07:00) (87% - 100%)        10-30 @ 07:01  -  10-31 @ 07:00  --------------------------------------------------------  IN: 892.4 mL / OUT: 1000 mL / NET: -107.6 mL      CAPILLARY BLOOD GLUCOSE      POCT Blood Glucose.: 147 mg/dL (31 Oct 2021 05:38)        PHYSICAL EXAM:  GENERAL: NAD, AAOx1  HEAD:  Atraumatic, Normocephalic  CHEST/LUNG: CTABL, No wheezes B/L  HEART: RRR  ABDOMEN: Soft, Nontender, Nondistended; Bowel sounds present  EXTREMITIES:  2+ Peripheral Pulses, No clubbing, cyanosis, or edema  NEURO: No focal deficits      MEDICATIONS:  MEDICATIONS  (STANDING):  ascorbic acid 500 milliGRAM(s) Oral daily  atorvastatin 80 milliGRAM(s) Oral at bedtime  chlorhexidine 2% Cloths 1 Application(s) Topical daily  chlorhexidine 4% Liquid 1 Application(s) Topical <User Schedule>  cholecalciferol 1000 Unit(s) Oral daily  dexMEDEtomidine Infusion 0.75 MICROgram(s)/kG/Hr (18.1 mL/Hr) IV Continuous <Continuous>  dextrose 40% Gel 15 Gram(s) Oral once  dextrose 5%. 1000 milliLiter(s) (50 mL/Hr) IV Continuous <Continuous>  dextrose 5%. 1000 milliLiter(s) (100 mL/Hr) IV Continuous <Continuous>  dextrose 50% Injectable 25 Gram(s) IV Push once  dextrose 50% Injectable 12.5 Gram(s) IV Push once  dextrose 50% Injectable 25 Gram(s) IV Push once  folic acid 1 milliGRAM(s) Oral daily  glucagon  Injectable 1 milliGRAM(s) IntraMuscular once  insulin glargine Injectable (LANTUS) 6 Unit(s) SubCutaneous at bedtime  insulin lispro (ADMELOG) corrective regimen sliding scale   SubCutaneous every 6 hours  levothyroxine 25 MICROGram(s) Oral daily  metoprolol tartrate 25 milliGRAM(s) Oral two times a day  midodrine 20 milliGRAM(s) Oral every 8 hours  Nephro-celeste 1 Tablet(s) Oral daily  norepinephrine Infusion 0.05 MICROgram(s)/kG/Min (9.04 mL/Hr) IV Continuous <Continuous>  pantoprazole  Injectable 40 milliGRAM(s) IV Push daily  piperacillin/tazobactam IVPB.. 3.375 Gram(s) IV Intermittent every 12 hours  polyethylene glycol 3350 17 Gram(s) Oral two times a day  senna 2 Tablet(s) Oral at bedtime  sevelamer carbonate Powder 1600 milliGRAM(s) Oral three times a day with meals  trihexyphenidyl 2 milliGRAM(s) Oral three times a day  vancomycin  IVPB 1000 milliGRAM(s) IV Intermittent once    MEDICATIONS  (PRN):  acetaminophen     Tablet .. 650 milliGRAM(s) Oral every 6 hours PRN Mild Pain (1 - 3)  albuterol/ipratropium for Nebulization 3 milliLiter(s) Nebulizer every 6 hours PRN Shortness of Breath and/or Wheezing  guaiFENesin Oral Liquid (Sugar-Free) 200 milliGRAM(s) Oral every 6 hours PRN Cough  oxyCODONE    IR 5 milliGRAM(s) Oral every 4 hours PRN Severe Pain (7 - 10)  sodium chloride 0.9% lock flush 10 milliLiter(s) IV Push every 1 hour PRN Pre/post blood products, medications, blood draw, and to maintain line patency      ALLERGIES:  Allergies    adhesives (Rash)  latex (Urticaria)  No Known Drug Allergies    Intolerances        LABS:                        7.8    16.66 )-----------( 155      ( 31 Oct 2021 01:00 )             24.2     10-31    133<L>  |  97  |  56<H>  ----------------------------<  139<H>  4.2   |  22  |  4.19<H>    Ca    8.9      31 Oct 2021 01:00  Phos  5.1     10-31  Mg     2.2     10-31    TPro  6.1  /  Alb  2.5<L>  /  TBili  1.4<H>  /  DBili  x   /  AST  26  /  ALT  6<L>  /  AlkPhos  62  10-31    PT/INR - ( 31 Oct 2021 01:00 )   PT: 12.8 sec;   INR: 1.07 ratio         PTT - ( 31 Oct 2021 01:00 )  PTT:49.9 sec      RADIOLOGY & ADDITIONAL TESTS: Reviewed.

## 2021-10-31 NOTE — PROGRESS NOTE ADULT - SUBJECTIVE AND OBJECTIVE BOX
Chief complaint  Patient is a 71y old  Male who presents with a chief complaint of leg swelling (31 Oct 2021 11:21)   Review of systems  Patient in bed, looks comfortable, no hypoglycemic episodes.    Labs and Fingersticks  CAPILLARY BLOOD GLUCOSE      POCT Blood Glucose.: 145 mg/dL (31 Oct 2021 11:51)  POCT Blood Glucose.: 147 mg/dL (31 Oct 2021 05:38)  POCT Blood Glucose.: 141 mg/dL (30 Oct 2021 17:27)      Anion Gap, Serum: 14 (10-31 @ 01:00)  Anion Gap, Serum: 18 *H* (10-30 @ 00:11)      Calcium, Total Serum: 8.9 (10-31 @ 01:00)  Calcium, Total Serum: 9.1 (10-30 @ 00:11)  Albumin, Serum: 2.5 *L* (10-31 @ 01:00)  Albumin, Serum: 2.3 *L* (10-30 @ 00:11)    Alanine Aminotransferase (ALT/SGPT): 6 *L* (10-31 @ 01:00)  Alanine Aminotransferase (ALT/SGPT): 5 *L* (10-30 @ 00:11)  Alkaline Phosphatase, Serum: 62 (10-31 @ 01:00)  Alkaline Phosphatase, Serum: 63 (10-30 @ 00:11)  Aspartate Aminotransferase (AST/SGOT): 26 (10-31 @ 01:00)  Aspartate Aminotransferase (AST/SGOT): 26 (10-30 @ 00:11)        10-31    133<L>  |  97  |  56<H>  ----------------------------<  139<H>  4.2   |  22  |  4.19<H>    Ca    8.9      31 Oct 2021 01:00  Phos  5.1     10-31  Mg     2.2     10-31    TPro  6.1  /  Alb  2.5<L>  /  TBili  1.4<H>  /  DBili  x   /  AST  26  /  ALT  6<L>  /  AlkPhos  62  10-31                        7.3    17.48 )-----------( 140      ( 31 Oct 2021 11:59 )             22.4     Medications  MEDICATIONS  (STANDING):  ascorbic acid 500 milliGRAM(s) Oral daily  atorvastatin 80 milliGRAM(s) Oral at bedtime  carbidopa/levodopa  25/100 2.5 Tablet(s) Oral <User Schedule>  carbidopa/levodopa  25/100 2 Tablet(s) Oral <User Schedule>  chlorhexidine 2% Cloths 1 Application(s) Topical daily  chlorhexidine 4% Liquid 1 Application(s) Topical <User Schedule>  cholecalciferol 1000 Unit(s) Oral daily  dexMEDEtomidine Infusion 0.75 MICROgram(s)/kG/Hr (18.1 mL/Hr) IV Continuous <Continuous>  dextrose 40% Gel 15 Gram(s) Oral once  dextrose 5%. 1000 milliLiter(s) (50 mL/Hr) IV Continuous <Continuous>  dextrose 5%. 1000 milliLiter(s) (100 mL/Hr) IV Continuous <Continuous>  dextrose 50% Injectable 25 Gram(s) IV Push once  dextrose 50% Injectable 12.5 Gram(s) IV Push once  dextrose 50% Injectable 25 Gram(s) IV Push once  folic acid 1 milliGRAM(s) Oral daily  glucagon  Injectable 1 milliGRAM(s) IntraMuscular once  insulin glargine Injectable (LANTUS) 6 Unit(s) SubCutaneous at bedtime  insulin lispro (ADMELOG) corrective regimen sliding scale   SubCutaneous every 6 hours  levothyroxine 25 MICROGram(s) Oral daily  metoprolol tartrate 25 milliGRAM(s) Oral two times a day  midodrine 20 milliGRAM(s) Oral every 8 hours  Nephro-celeste 1 Tablet(s) Oral daily  norepinephrine Infusion 0.05 MICROgram(s)/kG/Min (9.04 mL/Hr) IV Continuous <Continuous>  pantoprazole  Injectable 40 milliGRAM(s) IV Push daily  piperacillin/tazobactam IVPB.. 3.375 Gram(s) IV Intermittent every 12 hours  polyethylene glycol 3350 17 Gram(s) Oral two times a day  QUEtiapine 25 milliGRAM(s) Oral at bedtime  senna 2 Tablet(s) Oral at bedtime  sevelamer carbonate Powder 1600 milliGRAM(s) Oral three times a day with meals  trihexyphenidyl 2 milliGRAM(s) Oral three times a day      Physical Exam  General: Patient comfortable in bed  Vital Signs Last 12 Hrs  T(F): 97.5 (10-31-21 @ 12:00), Max: 97.5 (10-31-21 @ 08:00)  HR: 55 (10-31-21 @ 13:00) (51 - 100)  BP: 125/57 (10-31-21 @ 13:00) (87/55 - 145/63)  BP(mean): 82 (10-31-21 @ 13:00) (64 - 91)  RR: 16 (10-31-21 @ 13:00) (11 - 24)  SpO2: 97% (10-31-21 @ 13:00) (87% - 100%)    Diagnostics    Free Thyroxine, Serum: AM Sched. Collection: 26-Oct-2021 06:00 (10-25 @ 11:45)  Thyroid Stimulating Hormone, Serum: AM Sched. Collection: 26-Oct-2021 06:00 (10-25 @ 11:45)  Cortisol AM, Serum: 08:00 (10-22 @ 12:47)  Free Thyroxine, Serum: AM Sched. Collection: 23-Oct-2021 06:00 (10-22 @ 12:43)  Thyroid Stimulating Hormone, Serum: AM Sched. Collection: 23-Oct-2021 06:00 (10-22 @ 12:43)           Chief complaint  Patient is a 71y old  Male who presents with a chief complaint of leg swelling (31 Oct 2021 11:21)   Review of systems  Patient in bed, looks comfortable, no hypoglycemic episodes.    Labs and Fingersticks  CAPILLARY BLOOD GLUCOSE      POCT Blood Glucose.: 145 mg/dL (31 Oct 2021 11:51)  POCT Blood Glucose.: 147 mg/dL (31 Oct 2021 05:38)  POCT Blood Glucose.: 141 mg/dL (30 Oct 2021 17:27)      Anion Gap, Serum: 14 (10-31 @ 01:00)  Anion Gap, Serum: 18 *H* (10-30 @ 00:11)      Calcium, Total Serum: 8.9 (10-31 @ 01:00)  Calcium, Total Serum: 9.1 (10-30 @ 00:11)  Albumin, Serum: 2.5 *L* (10-31 @ 01:00)  Albumin, Serum: 2.3 *L* (10-30 @ 00:11)    Alanine Aminotransferase (ALT/SGPT): 6 *L* (10-31 @ 01:00)  Alanine Aminotransferase (ALT/SGPT): 5 *L* (10-30 @ 00:11)  Alkaline Phosphatase, Serum: 62 (10-31 @ 01:00)  Alkaline Phosphatase, Serum: 63 (10-30 @ 00:11)  Aspartate Aminotransferase (AST/SGOT): 26 (10-31 @ 01:00)  Aspartate Aminotransferase (AST/SGOT): 26 (10-30 @ 00:11)        10-31    133<L>  |  97  |  56<H>  ----------------------------<  139<H>  4.2   |  22  |  4.19<H>    Ca    8.9      31 Oct 2021 01:00  Phos  5.1     10-31  Mg     2.2     10-31    TPro  6.1  /  Alb  2.5<L>  /  TBili  1.4<H>  /  DBili  x   /  AST  26  /  ALT  6<L>  /  AlkPhos  62  10-31                        7.3    17.48 )-----------( 140      ( 31 Oct 2021 11:59 )             22.4     Medications  MEDICATIONS  (STANDING):  ascorbic acid 500 milliGRAM(s) Oral daily  atorvastatin 80 milliGRAM(s) Oral at bedtime  carbidopa/levodopa  25/100 2.5 Tablet(s) Oral <User Schedule>  carbidopa/levodopa  25/100 2 Tablet(s) Oral <User Schedule>  chlorhexidine 2% Cloths 1 Application(s) Topical daily  chlorhexidine 4% Liquid 1 Application(s) Topical <User Schedule>  cholecalciferol 1000 Unit(s) Oral daily  dexMEDEtomidine Infusion 0.75 MICROgram(s)/kG/Hr (18.1 mL/Hr) IV Continuous <Continuous>  dextrose 40% Gel 15 Gram(s) Oral once  dextrose 5%. 1000 milliLiter(s) (50 mL/Hr) IV Continuous <Continuous>  dextrose 5%. 1000 milliLiter(s) (100 mL/Hr) IV Continuous <Continuous>  dextrose 50% Injectable 25 Gram(s) IV Push once  dextrose 50% Injectable 12.5 Gram(s) IV Push once  dextrose 50% Injectable 25 Gram(s) IV Push once  folic acid 1 milliGRAM(s) Oral daily  glucagon  Injectable 1 milliGRAM(s) IntraMuscular once  insulin glargine Injectable (LANTUS) 6 Unit(s) SubCutaneous at bedtime  insulin lispro (ADMELOG) corrective regimen sliding scale   SubCutaneous every 6 hours  levothyroxine 25 MICROGram(s) Oral daily  metoprolol tartrate 25 milliGRAM(s) Oral two times a day  midodrine 20 milliGRAM(s) Oral every 8 hours  Nephro-celeste 1 Tablet(s) Oral daily  norepinephrine Infusion 0.05 MICROgram(s)/kG/Min (9.04 mL/Hr) IV Continuous <Continuous>  pantoprazole  Injectable 40 milliGRAM(s) IV Push daily  piperacillin/tazobactam IVPB.. 3.375 Gram(s) IV Intermittent every 12 hours  polyethylene glycol 3350 17 Gram(s) Oral two times a day  QUEtiapine 25 milliGRAM(s) Oral at bedtime  senna 2 Tablet(s) Oral at bedtime  sevelamer carbonate Powder 1600 milliGRAM(s) Oral three times a day with meals  trihexyphenidyl 2 milliGRAM(s) Oral three times a day      Physical Exam  General: Patient comfortable in bed  Vital Signs Last 12 Hrs  T(F): 97.5 (10-31-21 @ 12:00), Max: 97.5 (10-31-21 @ 08:00)  HR: 55 (10-31-21 @ 13:00) (51 - 100)  BP: 125/57 (10-31-21 @ 13:00) (87/55 - 145/63)  BP(mean): 82 (10-31-21 @ 13:00) (64 - 91)  RR: 16 (10-31-21 @ 13:00) (11 - 24)  SpO2: 97% (10-31-21 @ 13:00) (87% - 100%)    Diagnostics    Free Thyroxine, Serum: AM Sched. Collection: 26-Oct-2021 06:00 (10-25 @ 11:45)  Thyroid Stimulating Hormone, Serum: AM Sched. Collection: 26-Oct-2021 06:00 (10-25 @ 11:45)  Cortisol AM, Serum: 08:00 (10-22 @ 12:47)  Free Thyroxine, Serum: AM Sched. Collection: 23-Oct-2021 06:00 (10-22 @ 12:43)  Thyroid Stimulating Hormone, Serum: AM Sched. Collection: 23-Oct-2021 06:00 (10-22 @ 12:43)

## 2021-10-31 NOTE — PROGRESS NOTE ADULT - ASSESSMENT
70yo M w/ PMHx of CAD (s/p CABG 2019), CKD (unknown stage), DM2, Parkinson's Disease, HTN, depression presents with new onset acute heart failure exacerbation, NSTEMI, started on hep gtt, now with bl RP bleed, acute anemia. transferred to MICU.    # Acute systolic and diastolic heart failure  # NSTEMI  # ABBY on CKD  # Atrial flutter  # acute hypoxic resp failure  # hemorrhagic shock, left RP hematoma, acute blood loss anemia  # delirium  TTE mod to severe LV SD, hypokinesis anterior wall  sp shiley placement for HD, HD per renal  not candidate for cath at this point  hep gtt held 2/2 anemia  10/27 active RP bleeding, transferred to MICU, sp multiple transfusions DDAVP  10/28 IR for abd angiography and embolization l4 and l5 lumbar artery  transfuse PRN  on midodrine  vanco zosyn started for leukocytosis, CXR left pleural effusion vs pna  sp NGT  on precedex  appreciate ICU care    # DM2 (diabetes mellitus, type 2)  per endo  hba1c 6.4    # CAD (coronary artery disease)  c/w atorvastatin  asa on hold    # Parkinsons disease   # delirium  carbidopa/levodopa held  c/w trihexyphenidyl    PCP Dr. Simons

## 2021-10-31 NOTE — PROGRESS NOTE ADULT - ASSESSMENT
Assessment  DMT2: 71y Male with DM T2 with hyperglycemia, A1C 6.4%, was on insulin at home, now on low-dose basal insulin and coverage, blood sugars are stable and trending within acceptable range, no hypoglycemias, appears comfortable in NAD, started on tube feeds.  Hypothyroidism: Patient has no history thyroid disease, was not on any thyroid supplements, subclinical with low-normal FT4, lethargic, now on synthroid 25 mcg po daily.  CHF: on medications, stable, monitored.  HTN: Controlled,  on antihypertensive medications.  Parkinsons: on meds, monitored.  CKD: Monitor labs/BMP      Kelsie Reece MD  Cell: [phone]  Office: [phone]     Assessment  DMT2: 71y Male with DM T2 with hyperglycemia, A1C 6.4%, was on insulin at home, now on low-dose basal insulin and coverage, blood sugars are stable and trending within acceptable range, no hypoglycemias, appears comfortable in NAD,  started on tube feeds.  Hypothyroidism: Patient has no history thyroid disease, was not on any thyroid supplements, subclinical with low-normal FT4, lethargic, now on synthroid 25 mcg po daily.  CHF: on medications, stable, monitored.  HTN: Controlled,  on antihypertensive medications.  Parkinsons: on meds, monitored.  CKD: Monitor labs/BMP      Kelsie Reece MD  Cell: [phone]  Office: [phone]

## 2021-10-31 NOTE — PROGRESS NOTE ADULT - ATTENDING COMMENTS
1. RP bleed bilaterally. L RP bleed embolized by IR. H/H stable after PRBC last night. H/H q 8-12 hrs.  If H/H further decreases. Will need to repeat CTA.  2.NSTEMI and aflutter. Initially placed on heparin and antiplatelet medication. All stopped for RP bleed. HR  50-60s. on lopressor.  3.ABBY from ATN . Now HD Pt  becomes hypotensive on HD and needs pressors intermittently. Off Levophed since 10pm.  4. Delirium: Multifactorial. Fever, hypotension, ICU. Pt now in  room with windows. Continue Precedex. Avoid ativan and versed.  today. Start Seroquel 25mg at night.  5.Parkinson's disease.  Restart Sinemet.  6. Fevers with increased wbc. WBC stable ~16. Awaiting culture results. If all cx negative then stop  on vancomycin and zosyn.

## 2021-10-31 NOTE — PROGRESS NOTE ADULT - ASSESSMENT
71 M w volume overload, GI consulted for drop in hgb    1. Volume overload per cardio    2. ABBY on CKD    3. Drop in hgb, no overt GI Bleed.     4. RP bleed  s/p Abdominal Angiography and Embolization on 10/29/2021 by Interventional radiology of l4and l5 lumbar artery  - h/h overall stable    5. abdominal distention  - AXR nonobstructive, give miralax BID    6. Elevated bilirubin likely due to hematoma    The plan of care was discussed with the physician assistant and modifications were made to the notation where appropriate.   Differential diagnosis and plan of care discussed with patient after the evaluation  35 minutes spent on total encounter of which more than fifty percent of the encounter was spent counseling and/or coordinating care by the attending physician.    [facility]  Gastroenterology and Hepatology  [address] [place], [place]  Office: [phone]  Cell: [phone]

## 2021-10-31 NOTE — PROGRESS NOTE ADULT - ASSESSMENT
72yo M w/ PMH of CAD (s/p CABG 2019), CKD (unknown stage), DM2, Parkinson's Disease, HTN, depression p/w b/l LE edema found to have ARF and NSTEMI w/ new Aflutter started on heparin gtt and HD. Course c/b RP bleed w/ anemia, admitted to the MICU for closer monitoring while on HD.    # Neuro:  - A&Ox1 from AOx2  - Continue to monitor  - will get EKG after dialysis to evaluate QT to consider starting seroquel    // Parkinson's Dz  - sinemet held, will consider restarting tomorrow    # CV:  // CAD/ NSTEMI  - TTE mod to severe LVS dysfxn, hypokinesis anterior wall  - Holding heparin gtt and ASA given increasing RP bleed  - c/w Atorvastatin  - Unstable for cath at this moment    // Aflutter  - c/w Lopressor 25mg BID  - Monitor for hypotension given RP bleed    # Pulm:  - On 4L NC  - Wean as tolerated    # GI:  - No active issues  - Diet: Renal  - Fluid Restrict to 1.5L    # Renal:  // ABBY on CKD  - s/p shiley placement w/ IR  - c/w Phoslo 1337 tid  - Nephro consulted, recommended HD in AM. Pending patient return from IR    # Endo  // DM2  - A1c 6.4%  - c/w Lantus 6U qHS  - c/w ISS    // Hypothyroid  - c/w Synthroid 25mcg QD    # ID:  - continue vanc and zosyn pending blood cultures    # Heme/Onc:  - Holding AC i/s/o RP bleed  - has not responding to blood transfusions, will f/u after 1 u PRBC w/ dialysis. Consider CTA if patient continues to not respond to transfusion.     # Skin:  // No active issues    # Ethics   - FULL CODE  70yo M w/ PMH of CAD (s/p CABG 2019), CKD (unknown stage), DM2, Parkinson's Disease, HTN, depression p/w b/l LE edema found to have ARF and NSTEMI w/ new Aflutter started on heparin gtt and HD. Course c/b RP bleed w/ anemia, admitted to the MICU for closer monitoring while on HD.    # Neuro:  - A&Ox1 from AOx2  - Continue to monitor  - will get EKG after dialysis to evaluate QT to consider starting seroquel    // Parkinson's Dz  - sinemet held, will consider restarting tomorrow    # CV:  // CAD/ NSTEMI  - TTE mod to severe LVS dysfxn, hypokinesis anterior wall  - Holding heparin gtt and ASA given increasing RP bleed  - c/w Atorvastatin  - Unstable for cath at this moment    // Aflutter  - c/w Lopressor 25mg BID  - Monitor for hypotension given RP bleed    # Pulm:  - On 4L NC  - Wean as tolerated  - B/L L >> R pleural effusions  - Appreciate pulm recs: consider L thorocentesis    # GI:  - No active issues  - Diet: Renal  - Fluid Restrict to 1.5L    # Renal:  // ABBY on CKD  - s/p shiley placement w/ IR  - c/w Phoslo 1337 tid  - Nephro consulted, recommended HD in AM. Pending patient return from IR    # Endo  // DM2  - A1c 6.4%  - c/w Lantus 6U qHS  - c/w ISS    // Hypothyroid  - c/w Synthroid 25mcg QD    # ID:  - continue vanc and zosyn pending blood cultures    # Heme/Onc:  - Holding AC i/s/o RP bleed  - has not responding to blood transfusions, will f/u after 1 u PRBC w/ dialysis. Consider CTA if patient continues to not respond to transfusion.     # Skin:  // No active issues    # Ethics   - FULL CODE  72yo M w/ PMH of CAD (s/p CABG 2019), CKD (unknown stage), DM2, Parkinson's Disease, HTN, depression p/w b/l LE edema found to have ARF and NSTEMI w/ new Aflutter started on heparin gtt and HD. Course c/b RP bleed w/ anemia, admitted to the MICU for closer monitoring while on HD.    # Neuro:  - A&Ox1 from AOx2  - Continue to monitor  - will get EKG after dialysis to evaluate QT to consider starting seroquel    // Parkinson's Dz  - sinemet held, will consider restarting tomorrow    # CV:  // CAD/ NSTEMI  - TTE mod to severe LVS dysfxn, hypokinesis anterior wall  - Holding heparin gtt and ASA given increasing RP bleed  - c/w Atorvastatin  - Unstable for cath at this moment    // Aflutter  - c/w Lopressor 25mg BID  - Monitor for hypotension given RP bleed    # Pulm:  - On 4L NC  - Wean as tolerated  - B/L L >> R pleural effusions    # GI:  - No active issues  - Diet: Renal  - Fluid Restrict to 1.5L    # Renal:  // ABBY on CKD  - s/p shiley placement w/ IR  - c/w Phoslo 1337 tid  - Nephro consulted, recommended HD in AM. Pending patient return from IR    # Endo  // DM2  - A1c 6.4%  - c/w Lantus 6U qHS  - c/w ISS    // Hypothyroid  - c/w Synthroid 25mcg QD    # ID:  - continue vanc and zosyn pending blood cultures    # Heme/Onc:  - Holding AC i/s/o RP bleed  - has not responding to blood transfusions, will f/u after 1 u PRBC w/ dialysis. Consider CTA if patient continues to not respond to transfusion.     # Skin:  // No active issues    # Ethics   - FULL CODE  72yo M w/ PMH of CAD (s/p CABG 2019), CKD (unknown stage), DM2, Parkinson's Disease, HTN, depression p/w b/l LE edema found to have ARF and NSTEMI w/ new Aflutter started on heparin gtt and HD. Course c/b RP bleed w/ anemia, admitted to the MICU for closer monitoring while on HD.    # Neuro:  - A&Ox1 from AOx2  - Continue to monitor  - will get EKG after dialysis to evaluate QT to consider starting seroquel    // Parkinson's Dz  - Restart sinemet    # CV:  // CAD/ NSTEMI  - TTE mod to severe LVS dysfxn, hypokinesis anterior wall  - Holding heparin gtt and ASA given increasing RP bleed  - c/w Atorvastatin  - Unstable for cath at this moment    // Aflutter  - c/w Lopressor 25mg BID  - Monitor for hypotension given RP bleed    # Pulm:  - On 4L NC  - Wean as tolerated  - B/L L >> R pleural effusions    # GI:  - No active issues  - Diet: Renal  - Fluid Restrict to 1.5L    # Renal:  // ABBY on CKD  - s/p shiley placement w/ IR  - c/w Phoslo 1337 tid  - HD yesterday    # Endo  // DM2  - A1c 6.4%  - c/w Lantus 6U qHS  - c/w ISS    // Hypothyroid  - c/w Synthroid 25mcg QD    # ID:  - continue vanc and zosyn pending blood cultures, if negative 11/1 then d/c    # Heme/Onc:  - Holding AC i/s/o RP bleed  - has not responding to blood transfusions, will f/u after 1 u PRBC w/ dialysis. Consider CTA if patient continues to not respond to transfusion.     # Skin:  // No active issues    # Ethics   - FULL CODE

## 2021-11-01 NOTE — PROGRESS NOTE ADULT - SUBJECTIVE AND OBJECTIVE BOX
Chief complaint  Patient is a 71y old  Male who presents with a chief complaint of leg swelling (01 Nov 2021 12:52)   Review of systems  Patient awake in bed, looks comfortable, no hypoglycemic episodes.    Labs and Fingersticks  CAPILLARY BLOOD GLUCOSE      POCT Blood Glucose.: 166 mg/dL (01 Nov 2021 11:24)  POCT Blood Glucose.: 175 mg/dL (01 Nov 2021 05:22)  POCT Blood Glucose.: 176 mg/dL (01 Nov 2021 00:05)  POCT Blood Glucose.: 174 mg/dL (31 Oct 2021 21:43)  POCT Blood Glucose.: 151 mg/dL (31 Oct 2021 18:03)      Anion Gap, Serum: 18 *H* (11-01 @ 00:14)  Anion Gap, Serum: 14 (10-31 @ 01:00)      Calcium, Total Serum: 8.8 (11-01 @ 00:14)  Calcium, Total Serum: 8.9 (10-31 @ 01:00)  Albumin, Serum: 2.3 *L* (11-01 @ 00:14)  Albumin, Serum: 2.5 *L* (10-31 @ 01:00)    Alanine Aminotransferase (ALT/SGPT): 6 *L* (11-01 @ 00:14)  Alanine Aminotransferase (ALT/SGPT): 6 *L* (10-31 @ 01:00)  Alkaline Phosphatase, Serum: 72 (11-01 @ 00:14)  Alkaline Phosphatase, Serum: 62 (10-31 @ 01:00)  Aspartate Aminotransferase (AST/SGOT): 42 *H* (11-01 @ 00:14)  Aspartate Aminotransferase (AST/SGOT): 26 (10-31 @ 01:00)        11-01    131<L>  |  93<L>  |  72<H>  ----------------------------<  154<H>  5.0   |  20<L>  |  5.31<H>    Ca    8.8      01 Nov 2021 00:14  Phos  6.2     11-01  Mg     2.4     11-01    TPro  6.2  /  Alb  2.3<L>  /  TBili  1.5<H>  /  DBili  x   /  AST  42<H>  /  ALT  6<L>  /  AlkPhos  72  11-01                        7.2    17.41 )-----------( 173      ( 01 Nov 2021 09:09 )             23.1     Medications  MEDICATIONS  (STANDING):  ascorbic acid 500 milliGRAM(s) Oral daily  atorvastatin 80 milliGRAM(s) Oral at bedtime  carbidopa/levodopa  25/100 2.5 Tablet(s) Oral <User Schedule>  carbidopa/levodopa  25/100 2 Tablet(s) Oral <User Schedule>  chlorhexidine 2% Cloths 1 Application(s) Topical daily  chlorhexidine 4% Liquid 1 Application(s) Topical <User Schedule>  cholecalciferol 1000 Unit(s) Oral daily  dextrose 40% Gel 15 Gram(s) Oral once  dextrose 5%. 1000 milliLiter(s) (50 mL/Hr) IV Continuous <Continuous>  dextrose 5%. 1000 milliLiter(s) (100 mL/Hr) IV Continuous <Continuous>  dextrose 50% Injectable 25 Gram(s) IV Push once  dextrose 50% Injectable 12.5 Gram(s) IV Push once  dextrose 50% Injectable 25 Gram(s) IV Push once  epoetin mari-epbx (RETACRIT) Injectable 00197 Unit(s) IV Push once  folic acid 1 milliGRAM(s) Oral daily  glucagon  Injectable 1 milliGRAM(s) IntraMuscular once  insulin glargine Injectable (LANTUS) 6 Unit(s) SubCutaneous at bedtime  insulin lispro (ADMELOG) corrective regimen sliding scale   SubCutaneous every 6 hours  levothyroxine 25 MICROGram(s) Oral daily  metoprolol tartrate 25 milliGRAM(s) Oral two times a day  midodrine 20 milliGRAM(s) Oral every 8 hours  midodrine. 10 milliGRAM(s) Oral <User Schedule>  Nephro-celeste 1 Tablet(s) Oral daily  pantoprazole  Injectable 40 milliGRAM(s) IV Push daily  piperacillin/tazobactam IVPB.. 3.375 Gram(s) IV Intermittent every 12 hours  polyethylene glycol 3350 17 Gram(s) Oral two times a day  QUEtiapine 25 milliGRAM(s) Oral at bedtime  senna 2 Tablet(s) Oral at bedtime  sevelamer carbonate Powder 1600 milliGRAM(s) Oral three times a day with meals  trihexyphenidyl 2 milliGRAM(s) Oral three times a day      Physical Exam  General: Patient comfortable in bed  Vital Signs Last 12 Hrs  T(F): 98.1 (11-01-21 @ 12:00), Max: 98.1 (11-01-21 @ 12:00)  HR: 114 (11-01-21 @ 13:00) (55 - 115)  BP: 127/78 (11-01-21 @ 13:00) (74/49 - 131/57)  BP(mean): 96 (11-01-21 @ 13:00) (57 - 96)  RR: 20 (11-01-21 @ 13:00) (12 - 35)  SpO2: 95% (11-01-21 @ 13:00) (94% - 100%)  Neck: No palpable thyroid nodules.  CVS: S1S2, No murmurs  Respiratory: No wheezing, no crepitations  GI: Abdomen soft, bowel sounds positive  Musculoskeletal:  edema lower extremities.   Skin: No skin rashes, no ecchymosis    Diagnostics    Free Thyroxine, Serum: AM Sched. Collection: 26-Oct-2021 06:00 (10-25 @ 11:45)  Thyroid Stimulating Hormone, Serum: AM Sched. Collection: 26-Oct-2021 06:00 (10-25 @ 11:45)  Cortisol AM, Serum: 08:00 (10-22 @ 12:47)  Free Thyroxine, Serum: AM Sched. Collection: 23-Oct-2021 06:00 (10-22 @ 12:43)  Thyroid Stimulating Hormone, Serum: AM Sched. Collection: 23-Oct-2021 06:00 (10-22 @ 12:43)           Chief complaint  Patient is a 71y old  Male who presents with a chief complaint of leg swelling (01 Nov 2021 12:52)   Review of systems  Patient awake in bed, looks comfortable, no hypoglycemic episodes.    Labs and Fingersticks  CAPILLARY BLOOD GLUCOSE      POCT Blood Glucose.: 166 mg/dL (01 Nov 2021 11:24)  POCT Blood Glucose.: 175 mg/dL (01 Nov 2021 05:22)  POCT Blood Glucose.: 176 mg/dL (01 Nov 2021 00:05)  POCT Blood Glucose.: 174 mg/dL (31 Oct 2021 21:43)  POCT Blood Glucose.: 151 mg/dL (31 Oct 2021 18:03)      Anion Gap, Serum: 18 *H* (11-01 @ 00:14)  Anion Gap, Serum: 14 (10-31 @ 01:00)      Calcium, Total Serum: 8.8 (11-01 @ 00:14)  Calcium, Total Serum: 8.9 (10-31 @ 01:00)  Albumin, Serum: 2.3 *L* (11-01 @ 00:14)  Albumin, Serum: 2.5 *L* (10-31 @ 01:00)    Alanine Aminotransferase (ALT/SGPT): 6 *L* (11-01 @ 00:14)  Alanine Aminotransferase (ALT/SGPT): 6 *L* (10-31 @ 01:00)  Alkaline Phosphatase, Serum: 72 (11-01 @ 00:14)  Alkaline Phosphatase, Serum: 62 (10-31 @ 01:00)  Aspartate Aminotransferase (AST/SGOT): 42 *H* (11-01 @ 00:14)  Aspartate Aminotransferase (AST/SGOT): 26 (10-31 @ 01:00)        11-01    131<L>  |  93<L>  |  72<H>  ----------------------------<  154<H>  5.0   |  20<L>  |  5.31<H>    Ca    8.8      01 Nov 2021 00:14  Phos  6.2     11-01  Mg     2.4     11-01    TPro  6.2  /  Alb  2.3<L>  /  TBili  1.5<H>  /  DBili  x   /  AST  42<H>  /  ALT  6<L>  /  AlkPhos  72  11-01                        7.2    17.41 )-----------( 173      ( 01 Nov 2021 09:09 )             23.1     Medications  MEDICATIONS  (STANDING):  ascorbic acid 500 milliGRAM(s) Oral daily  atorvastatin 80 milliGRAM(s) Oral at bedtime  carbidopa/levodopa  25/100 2.5 Tablet(s) Oral <User Schedule>  carbidopa/levodopa  25/100 2 Tablet(s) Oral <User Schedule>  chlorhexidine 2% Cloths 1 Application(s) Topical daily  chlorhexidine 4% Liquid 1 Application(s) Topical <User Schedule>  cholecalciferol 1000 Unit(s) Oral daily  dextrose 40% Gel 15 Gram(s) Oral once  dextrose 5%. 1000 milliLiter(s) (50 mL/Hr) IV Continuous <Continuous>  dextrose 5%. 1000 milliLiter(s) (100 mL/Hr) IV Continuous <Continuous>  dextrose 50% Injectable 25 Gram(s) IV Push once  dextrose 50% Injectable 12.5 Gram(s) IV Push once  dextrose 50% Injectable 25 Gram(s) IV Push once  epoetin mari-epbx (RETACRIT) Injectable 32811 Unit(s) IV Push once  folic acid 1 milliGRAM(s) Oral daily  glucagon  Injectable 1 milliGRAM(s) IntraMuscular once  insulin glargine Injectable (LANTUS) 6 Unit(s) SubCutaneous at bedtime  insulin lispro (ADMELOG) corrective regimen sliding scale   SubCutaneous every 6 hours  levothyroxine 25 MICROGram(s) Oral daily  metoprolol tartrate 25 milliGRAM(s) Oral two times a day  midodrine 20 milliGRAM(s) Oral every 8 hours  midodrine. 10 milliGRAM(s) Oral <User Schedule>  Nephro-celeste 1 Tablet(s) Oral daily  pantoprazole  Injectable 40 milliGRAM(s) IV Push daily  piperacillin/tazobactam IVPB.. 3.375 Gram(s) IV Intermittent every 12 hours  polyethylene glycol 3350 17 Gram(s) Oral two times a day  QUEtiapine 25 milliGRAM(s) Oral at bedtime  senna 2 Tablet(s) Oral at bedtime  sevelamer carbonate Powder 1600 milliGRAM(s) Oral three times a day with meals  trihexyphenidyl 2 milliGRAM(s) Oral three times a day      Physical Exam  General: Patient comfortable in bed  Vital Signs Last 12 Hrs  T(F): 98.1 (11-01-21 @ 12:00), Max: 98.1 (11-01-21 @ 12:00)  HR: 114 (11-01-21 @ 13:00) (55 - 115)  BP: 127/78 (11-01-21 @ 13:00) (74/49 - 131/57)  BP(mean): 96 (11-01-21 @ 13:00) (57 - 96)  RR: 20 (11-01-21 @ 13:00) (12 - 35)  SpO2: 95% (11-01-21 @ 13:00) (94% - 100%)  Neck: No palpable thyroid nodules.  CVS: S1S2, No murmurs  Respiratory: No wheezing, no crepitations  GI: Abdomen soft, bowel sounds positive  Musculoskeletal:  edema lower extremities.   Skin: No skin rashes, no ecchymosis    Diagnostics    Free Thyroxine, Serum: AM Sched. Collection: 26-Oct-2021 06:00 (10-25 @ 11:45)  Thyroid Stimulating Hormone, Serum: AM Sched. Collection: 26-Oct-2021 06:00 (10-25 @ 11:45)  Cortisol AM, Serum: 08:00 (10-22 @ 12:47)  Free Thyroxine, Serum: AM Sched. Collection: 23-Oct-2021 06:00 (10-22 @ 12:43)  Thyroid Stimulating Hormone, Serum: AM Sched. Collection: 23-Oct-2021 06:00 (10-22 @ 12:43)

## 2021-11-01 NOTE — PROGRESS NOTE ADULT - ASSESSMENT
ASSESSMENT/PLAN  70yo M w/ PMHx of CAD (s/p CABG 2019), CKD (unknown stage), DM2, Parkinson's Disease, HTN, depression presents with bilateral leg swelling  Mild CHF  Acute on chronic renal failure --getting worse   Hyperphosphatemia  Severe LV dysfucntion     1 IR-  perm cath early next week       2 Renal-   HD  today and aim for 1.5 kg fluid removal     3 Hyperphosphatemia -  phoslo  1334 mg TID     4 CVS- soft at present    5 Anemia - hgb stable at 7 s/p 2 units prbcs 10/28;  Serial CBC at present and Retacrit at HD    [facility] ICU     [employer]  [phone]

## 2021-11-01 NOTE — PROGRESS NOTE ADULT - ASSESSMENT
70yo M w/ PMH of CAD (s/p CABG 2019), CKD (unknown stage), DM2, Parkinson's Disease, HTN, depression p/w b/l LE edema found to have ARF and NSTEMI w/ new Aflutter started on heparin gtt and HD. Course c/b RP bleed w/ anemia, admitted to the MICU for closer monitoring while on HD.    # Neuro:  - A&Ox1 from AOx2  - Continue to monitor  - will get EKG after dialysis to evaluate QT to consider starting seroquel    // Parkinson's Dz  - Restart sinemet    # CV:  // CAD/ NSTEMI  - TTE mod to severe LVS dysfxn, hypokinesis anterior wall  - Holding heparin gtt and ASA given increasing RP bleed  - c/w Atorvastatin  - Unstable for cath at this moment    // Aflutter  - c/w Lopressor 25mg BID  - Monitor for hypotension given RP bleed    # Pulm:  - On 4L NC  - Wean as tolerated  - B/L L >> R pleural effusions    # GI:  - No active issues  - Diet: Renal  - Fluid Restrict to 1.5L    # Renal:  // ABBY on CKD  - s/p shiley placement w/ IR  - c/w Phoslo 1337 tid  - HD yesterday    # Endo  // DM2  - A1c 6.4%  - c/w Lantus 6U qHS  - c/w ISS    // Hypothyroid  - c/w Synthroid 25mcg QD    # ID:  - continue vanc and zosyn pending blood cultures, if negative 11/1 then d/c    # Heme/Onc:  - Holding AC i/s/o RP bleed  - has not responding to blood transfusions, will f/u after 1 u PRBC w/ dialysis. Consider CTA if patient continues to not respond to transfusion.     # Skin:  // No active issues    # Ethics   - FULL CODE  72yo M w/ PMH of CAD (s/p CABG 2019), CKD (unknown stage), DM2, Parkinson's Disease, HTN, depression p/w b/l LE edema found to have ARF and NSTEMI w/ new Aflutter started on heparin gtt and HD. Course c/b RP bleed w/ anemia, admitted to the MICU for closer monitoring while on HD.    # Neuro:  - A&Ox1 from AOx2  - Continue to monitor  - Pt started on Seroquel  - Stopped precedex     // Parkinson's Dz  - Restart sinemet    # CV:  // CAD/ NSTEMI  - TTE mod to severe LVS dysfxn, hypokinesis anterior wall  - Holding heparin gtt and ASA given increasing RP bleed  - c/w Atorvastatin  - Unstable for cath at this moment  //Hypotension:  - Likely 2/2 hemorrhagic vs cardiogenic shock.  - Currently on midodrine 20 mg   - Give midodrine 10 mg 30 mins prior to dialysis    // Aflutter  - c/w Lopressor 25mg BID  - Monitor for hypotension given RP bleed    # Pulm:  - On 4L NC  - Wean as tolerated  - B/L L >> R pleural effusions    # GI:  - No active issues  - Diet: Renal - via NGT  - Fluid Restrict to 1.5L    # Renal:  // ABBY on CKD  - s/p shiley placement w/ IR (placed 10/28)  - c/w Phoslo 1337 tid  - HD planned for today - removal 1.5L     # Endo  // DM2  - A1c 6.4%  - c/w Lantus 6U qHS  - c/w ISS    // Hypothyroid  - c/w Synthroid 25mcg QD    # ID:  - continue vanc and zosyn pending blood cultures  - BC NGTD - will d/c abx if final cx negative today.     # Heme/Onc:  - Holding AC i/s/o RP bleed  - Hgb stabilizing, will consider CTA if there is significant drop in Hgb or acute change in hemodynamic status.     # Skin:  // No active issues    # Ethics   - FULL CODE  70yo M w/ PMH of CAD (s/p CABG 2019), CKD (unknown stage), DM2, Parkinson's Disease, HTN, depression p/w b/l LE edema found to have ARF and NSTEMI w/ new Aflutter started on heparin gtt and HD. Course c/b RP bleed w/ anemia, admitted to the MICU for closer monitoring while on HD.    # Neuro:  - A&Ox1 from AOx2  - Continue to monitor  - Pt started on Seroquel  - Stopped precedex     // Parkinson's Dz  - Continue sinemet    # CV:  // CAD/ NSTEMI  - TTE mod to severe LVS dysfxn, hypokinesis anterior wall  - Holding heparin gtt and ASA given increasing RP bleed  - c/w Atorvastatin  - Unstable for cath at this moment  //Hypotension:  - Likely 2/2 hemorrhagic vs cardiogenic shock.  - Currently on midodrine 20 mg, MAP goal > 65  - Give midodrine 10 mg 30 mins prior to dialysis    // Aflutter  - c/w Lopressor 25mg BID  - Monitor for hypotension given RP bleed    # Pulm:  - On 4L NC  - Wean as tolerated  - B/L L >> R pleural effusions    # GI:  - No active issues  - Diet: Renal - via NGT  - Fluid Restrict to 1.5L    # Renal:  // ABBY on CKD  - s/p shiley placement w/ IR (placed 10/28)  - c/w Phoslo 1337 tid  - HD planned for today - removal 1.5L     # Endo  // DM2  - A1c 6.4%  - c/w Lantus 6U qHS  - c/w ISS    // Hypothyroid  - c/w Synthroid 25mcg QD    # ID:  - continue vanc and zosyn pending blood cultures  - BC NGTD - will d/c abx if final cx negative today.     # Heme/Onc:  - Holding AC i/s/o RP bleed  - Hgb stabilizing, will consider CTA if there is significant drop in Hgb or acute change in hemodynamic status.     # Skin:  // No active issues    # Ethics   - FULL CODE

## 2021-11-01 NOTE — CHART NOTE - NSCHARTNOTEFT_GEN_A_CORE
Nutrition Follow Up Note  Patient seen for: Nutrition Follow up/ EN initiation    Chart reviewed, events noted. "72 y/o Male w/ PMHx of CAD (s/p CABG ), CKD (unknown stage), DM2, Parkinson's Disease, HTN, depression presents with new onset acute heart failure exacerbation, NSTEMI, started on hep gtt, now with bl RP bleed, acute anemia. transferred to MICU".    Source: [] Patient       [x] EMR        [x] RN        [] Family at bedside       [] Other:    -If unable to interview patient: [] Trach/Vent/BiPAP  [x] Disoriented/confused/inappropriate to interview    Diet Order:   Diet, NPO with Tube Feed:   Tube Feeding Modality: Nasogastric  Nepro with Carb Steady (NEPRORTH)  Continuous  Starting Tube Feed Rate {mL per Hour}: 10  Increase Tube Feed Rate by (mL): 10     Every 4 hours  Until Goal Tube Feed Rate (mL per Hour): 20  Tube Feed Duration (in Hours): 24  Tube Feed Start Time: 11:00  Tube Feed Stop Time: 05:00 (10-31-21)    - Is current order appropriate/adequate? [] Yes  [x]  No: Current EN order would be providing 480 ml total volume, 864 kcal, 39 gm protein. Current EN order does not meet nutritional needs at this time.    - PO intake :   [x] >50%  Fair; pt previously on PO diet with variable PO intake    - Nutrition-related concerns:  - Pt transferred to MICU on 10/29 for anemia and urgent HD  - Pt with delirium, A&Ox1-2 per chart  - Plan for perm-a-cath placement early next week  - Per Last RD note from 10/27, pt with poor PO intake (<50%) for past 2 days previously & pt NPO from 10/28-10/31  - Plan for HD today  - Pt with NGT in place; noted per RN, pt pulled NGT overnight, new NGT replaced this morning, feeds to be initiated today  - No GI distress noted per RN; last bowel movement  per flow sheets; pt ordered for senna & miralax  - Pt with hyperphosphatemia; ordered for renvela  - Pt ordered for ISS admelog and lantus for glycemic control  - Pt continues to be ordered for ascorbic acid, Nephro-Celeste, folic acid & cholecalciferol    Weights:   Daily Weight in k.4 (10-30), Weight in k.4 (10-), Weight in k.4 (10-), Weight in k.4 (10-), Weight in k (10-), Weight in k.5 (10-), Weight in k.3 (10-); noted pt with overall weight loss since admit (likely fluid loss + lean body mass loss- pt on HD and ordered for lasix in-house); pt with a 4.9 kg/5% weight loss x 1 week (clinically significant); will continue to monitor trends    Nutritionally Pertinent MEDICATIONS  (STANDING):  ascorbic acid  atorvastatin  cholecalciferol  dextrose 40% Gel  dextrose 5%.  dextrose 5%.  dextrose 50% Injectable  dextrose 50% Injectable  dextrose 50% Injectable  folic acid  glucagon  Injectable  insulin glargine Injectable (LANTUS)  insulin lispro (ADMELOG) corrective regimen sliding scale  levothyroxine  metoprolol tartrate  midodrine  midodrine.  Nephro-celeste  pantoprazole  Injectable  piperacillin/tazobactam IVPB..  polyethylene glycol 3350  senna    Pertinent Labs:  @ 00:14: Na 131<L>, BUN 72<H>, Cr 5.31<H>, <H>, K+ 5.0, Phos 6.2<H>, Mg 2.4, Alk Phos 72, ALT/SGPT 6<L>, AST/SGOT 42<H>, HbA1c --    A1C with Estimated Average Glucose Result: 6.4 % (10-22-21 @ 08:56)    Finger Sticks:  POCT Blood Glucose.: 166 mg/dL ( @ 11:24)  POCT Blood Glucose.: 175 mg/dL ( @ 05:22)  POCT Blood Glucose.: 176 mg/dL ( @ 00:05)  POCT Blood Glucose.: 174 mg/dL (10-31 @ 21:43)  POCT Blood Glucose.: 151 mg/dL (10-31 @ 18:03)  POCT Blood Glucose.: 145 mg/dL (10-31 @ 11:51)    Skin per nursing documentation: right buttock stage 2 pressure injury per flow sheets  Edema: 2+ left leg, right leg per flow sheets    Estimated Needs: based on IBW of 78 kg:  [x] recalculated: with consideration for HD  Estimated Energy Needs: 2933-4276 kcal (30-35 kcal/kg)  Estimated Protein Needs:  gm protein (1.2-1.4 g/kg)  Defer fluid needs to team    Previous Nutrition Diagnosis: Increased Nutrient Needs, Food and Nutrition Related Knowledge Deficit, Inadequate oral intake  Nutrition Diagnosis is: [x] ongoing, being advanced as below    New Nutrition Diagnosis: Severe, acute on chronic malnutrition related to decreased ability to consume sufficient energy/protein as evidenced by pt meeting <50% EER > 5 days, moderate edema, 5% weight loss x 1 week (fluid vs. LBM).    Nutrition Care Plan:  [x] In Progress  [] Achieved  [] Not applicable    Recommendations:      1. Once medically feasible, recommend initiating Nepro at 10 ml/hr and advance by 5 ml every 6 hours to goal rate of 55 ml/hr x 24 hours to provide 1320 ml total volume, 2376 kcal, 107 gm protein and 960 ml free water. Feeds at goal would meet 30 kcal/kg and 1.37 g/kg protein based on IBW of 78 kg. Defer fluid needs to team.  2. RD remains available to adjust EN regimen as needed  3. Continue with micronutrient supplementation as appropriate  4. Malnutrition notification placed in chart     Monitoring and Evaluation:   Continue to monitor nutritional intake, tolerance to diet prescription, weights, labs, skin integrity    RD remains available upon request and will follow up per protocol  Allyssa Kumar MS, RD, CDN, [facility] pgr #[phone] Nutrition Follow Up Note  Patient seen for: Nutrition Follow up/ EN initiation    Chart reviewed, events noted. "72 y/o Male w/ PMHx of CAD (s/p CABG ), CKD (unknown stage), DM2, Parkinson's Disease, HTN, depression presents with new onset acute heart failure exacerbation, NSTEMI, started on hep gtt, now with bl RP bleed, acute anemia. transferred to MICU".    Source: [] Patient       [x] EMR        [x] RN        [] Family at bedside       [] Other:    -If unable to interview patient: [] Trach/Vent/BiPAP  [x] Disoriented/confused/inappropriate to interview    Diet Order:   Diet, NPO with Tube Feed:   Tube Feeding Modality: Nasogastric  Nepro with Carb Steady (NEPRORTH)  Continuous  Starting Tube Feed Rate {mL per Hour}: 10  Increase Tube Feed Rate by (mL): 10     Every 4 hours  Until Goal Tube Feed Rate (mL per Hour): 20  Tube Feed Duration (in Hours): 24  Tube Feed Start Time: 11:00  Tube Feed Stop Time: 05:00 (10-31-21)    - Is current order appropriate/adequate? [] Yes  [x]  No: Current EN order would be providing 480 ml total volume, 864 kcal, 39 gm protein. Current EN order does not meet nutritional needs at this time.    - PO intake :   [x] >50%  Fair; pt previously on PO diet with variable PO intake    - Nutrition-related concerns:  - Pt transferred to MICU on 10/29 for anemia and urgent HD  - Pt with delirium, A&Ox1-2 per chart  - Plan for perm-a-cath placement early next week  - Per Last RD note from 10/27, pt with poor PO intake (<50%) for past 2 days previously & pt NPO from 10/28-10/31  - Plan for HD today  - Pt with NGT in place; noted per RN, pt pulled NGT overnight, new NGT replaced this morning, feeds to be initiated today  - No GI distress noted per RN; last bowel movement  per flow sheets; pt ordered for senna & miralax  - Pt with hyperphosphatemia; ordered for renvela  - Pt ordered for ISS admelog and lantus for glycemic control  - Pt continues to be ordered for ascorbic acid, Nephro-Celeste, folic acid & cholecalciferol    Weights:   Daily Weight in k.4 (10-30), Weight in k.4 (10-), Weight in k.4 (10-), Weight in k.4 (10-), Weight in k (10-), Weight in k.5 (10-), Weight in k.3 (10-); noted pt with overall weight loss since admit (likely fluid loss + lean body mass loss- pt on HD and ordered for lasix in-house); pt with a 4.9 kg/5% weight loss x 1 week (clinically significant); will continue to monitor trends    Nutritionally Pertinent MEDICATIONS  (STANDING):  ascorbic acid  atorvastatin  cholecalciferol  dextrose 40% Gel  dextrose 5%.  dextrose 5%.  dextrose 50% Injectable  dextrose 50% Injectable  dextrose 50% Injectable  folic acid  glucagon  Injectable  insulin glargine Injectable (LANTUS)  insulin lispro (ADMELOG) corrective regimen sliding scale  levothyroxine  metoprolol tartrate  midodrine  midodrine.  Nephro-celeste  pantoprazole  Injectable  piperacillin/tazobactam IVPB..  polyethylene glycol 3350  senna    Pertinent Labs:  @ 00:14: Na 131<L>, BUN 72<H>, Cr 5.31<H>, <H>, K+ 5.0, Phos 6.2<H>, Mg 2.4, Alk Phos 72, ALT/SGPT 6<L>, AST/SGOT 42<H>, HbA1c --    A1C with Estimated Average Glucose Result: 6.4 % (10-22-21 @ 08:56)    Finger Sticks:  POCT Blood Glucose.: 166 mg/dL ( @ 11:24)  POCT Blood Glucose.: 175 mg/dL ( @ 05:22)  POCT Blood Glucose.: 176 mg/dL ( @ 00:05)  POCT Blood Glucose.: 174 mg/dL (10-31 @ 21:43)  POCT Blood Glucose.: 151 mg/dL (10-31 @ 18:03)  POCT Blood Glucose.: 145 mg/dL (10-31 @ 11:51)    Skin per nursing documentation: right buttock stage 2 pressure injury per flow sheets  Edema: 2+ left leg, right leg per flow sheets    Estimated Needs: based on IBW of 78 kg:  [x] recalculated: with consideration for HD  Estimated Energy Needs: 6814-1805 kcal (30-35 kcal/kg)  Estimated Protein Needs:  gm protein (1.2-1.4 g/kg)  Defer fluid needs to team    Previous Nutrition Diagnosis: Increased Nutrient Needs, Food and Nutrition Related Knowledge Deficit, Inadequate oral intake  Nutrition Diagnosis is: [x] ongoing, being advanced as below    New Nutrition Diagnosis: Severe, acute on chronic malnutrition related to decreased ability to consume sufficient energy/protein as evidenced by pt meeting <50% EER > 5 days, moderate edema, 5% weight loss x 1 week (fluid vs. LBM).    Nutrition Care Plan:  [x] In Progress  [] Achieved  [] Not applicable    Recommendations:      1. Once medically feasible, recommend initiating Nepro at 10 ml/hr and advance by 5 ml every 6 hours to goal rate of 55 ml/hr x 24 hours to provide 1320 ml total volume, 2376 kcal, 107 gm protein and 960 ml free water. Feeds at goal would meet 30 kcal/kg and 1.37 g/kg protein based on IBW of 78 kg. Defer fluid needs to team.  2. RD remains available to adjust EN regimen as needed  3. Continue with micronutrient supplementation as appropriate  4. Malnutrition notification placed in chart   5. Consider removing 500 mg Vit C daily for contraindication with renal disease    Monitoring and Evaluation:   Continue to monitor nutritional intake, tolerance to diet prescription, weights, labs, skin integrity    RD remains available upon request and will follow up per protocol  Allyssa Kumar MS, RD, CDN, [facility] pgr #[phone]

## 2021-11-01 NOTE — PROGRESS NOTE ADULT - SUBJECTIVE AND OBJECTIVE BOX
Follow-up Pulm Progress Note  Brayan eVrma MD  [phone]    No new respiratory events overnight.    Appears comfortble on nasal cannula  Hb 7.2, though stable 24 hr.      Vital Signs Last 24 Hrs  T(C): 36.6 (01 Nov 2021 08:00), Max: 36.6 (01 Nov 2021 08:00)  T(F): 97.8 (01 Nov 2021 08:00), Max: 97.8 (01 Nov 2021 08:00)  HR: 113 (01 Nov 2021 12:00) (55 - 115)  BP: 110/76 (01 Nov 2021 12:00) (74/49 - 131/57)  BP(mean): 88 (01 Nov 2021 12:00) (57 - 88)  RR: 29 (01 Nov 2021 12:00) (12 - 35)  SpO2: 94% (01 Nov 2021 12:00) (94% - 100%)                         7.2    17.41 )-----------( 173      ( 01 Nov 2021 09:09 )             23.1     11-01    131<L>  |  93<L>  |  72<H>  ----------------------------<  154<H>  5.0   |  20<L>  |  5.31<H>    Ca    8.8      01 Nov 2021 00:14  Phos  6.2     11-01  Mg     2.4     11-01    TPro  6.2  /  Alb  2.3<L>  /  TBili  1.5<H>  /  DBili  x   /  AST  42<H>  /  ALT  6<L>  /  AlkPhos  72  11-01      Physical Examination:  PULM: Diminished basilar BS  CVS: Regular rate and rhythm, no murmurs, rubs, or gallops  ABD: Soft, non-tender  EXT:  No clubbing, cyanosis, or edema    RADIOLOGY REVIEWED  CXR:    CT chest:    TTE:

## 2021-11-01 NOTE — PROGRESS NOTE ADULT - ASSESSMENT
Assessment  DMT2: 71y Male with DM T2 with hyperglycemia, A1C 6.4%, was on insulin at home, now on low-dose basal insulin and coverage, blood sugars are trending within overall acceptable range, no hypoglycemias, appears comfortable in NAD, remains NPO on tube feeds.  Hypothyroidism: Patient has no history thyroid disease, was not on any thyroid supplements, subclinical with low-normal FT4, lethargic, now on synthroid 25 mcg po daily.  CHF: on medications, stable, monitored.  HTN: Controlled,  on antihypertensive medications.  Parkinsons: on meds, monitored.  CKD: Monitor labs/BMP      Kelsie Reece MD  Cell: [phone]  Office: [phone]     Assessment  DMT2: 71y Male with DM T2 with hyperglycemia, A1C 6.4%, was on insulin at home, now on low-dose basal insulin and coverage, blood sugars are trending within overall acceptable range, no hypoglycemias, appears comfortable in NAD,  remains NPO on tube feeds.  Hypothyroidism: Patient has no history thyroid disease, was not on any thyroid supplements, subclinical with low-normal FT4, lethargic, now on synthroid 25 mcg po daily.  CHF: on medications, stable, monitored.  HTN: Controlled,  on antihypertensive medications.  Parkinsons: on meds, monitored.  CKD: Monitor labs/BMP      Klesie Reece MD  Cell: [phone]  Office: [phone]

## 2021-11-01 NOTE — CHART NOTE - NSCHARTNOTEFT_GEN_A_CORE
: Luiz Nance      INDICATION:      PROCEDURE:    [x ] LIMITED CHEST    FINDINGS:  Bilateral B lines. No right sided pleural effusion. Left sided moderate sized pleural effusion with small hyperechoic stranding    INTERPRETATION:  Pulmonary edema with Right sided pleural effusion which appears chronic. Pt to undergo dialysis today    Images stored on [employer]

## 2021-11-01 NOTE — PROGRESS NOTE ADULT - ASSESSMENT
ABBY now on HD  Acute on chronic systolic CHF with fluid overload  Bilateral L>>R pleural effusions  S/P RP bleed on AC   Atrial Flutter/Fib    REC    HD per renal  Monitor Hb  Consider L thorocentesis  Negative balance as tolerated once bleeding ceased

## 2021-11-01 NOTE — PROGRESS NOTE ADULT - SUBJECTIVE AND OBJECTIVE BOX
SUBJECTIVE / OVERNIGHT EVENTS:    patient seen and examined  pulled out NGT  + restraints   --------------------------------------------------------------------------------------------  LABS:                        7.2    17.41 )-----------( 173      ( 01 Nov 2021 09:09 )             23.1     11-01    131<L>  |  93<L>  |  72<H>  ----------------------------<  154<H>  5.0   |  20<L>  |  5.31<H>    Ca    8.8      01 Nov 2021 00:14  Phos  6.2     11-01  Mg     2.4     11-01    TPro  6.2  /  Alb  2.3<L>  /  TBili  1.5<H>  /  DBili  x   /  AST  42<H>  /  ALT  6<L>  /  AlkPhos  72  11-01    PT/INR - ( 01 Nov 2021 00:15 )   PT: 12.8 sec;   INR: 1.07 ratio         PTT - ( 01 Nov 2021 00:15 )  PTT:50.9 sec  CAPILLARY BLOOD GLUCOSE      POCT Blood Glucose.: 175 mg/dL (01 Nov 2021 05:22)  POCT Blood Glucose.: 176 mg/dL (01 Nov 2021 00:05)  POCT Blood Glucose.: 174 mg/dL (31 Oct 2021 21:43)  POCT Blood Glucose.: 151 mg/dL (31 Oct 2021 18:03)  POCT Blood Glucose.: 145 mg/dL (31 Oct 2021 11:51)            RADIOLOGY & ADDITIONAL TESTS:    Imaging Personally Reviewed:  [x] YES  [ ] NO    Consultant(s) Notes Reviewed:  [x] YES  [ ] NO    MEDICATIONS  (STANDING):  ascorbic acid 500 milliGRAM(s) Oral daily  atorvastatin 80 milliGRAM(s) Oral at bedtime  carbidopa/levodopa  25/100 2.5 Tablet(s) Oral <User Schedule>  carbidopa/levodopa  25/100 2 Tablet(s) Oral <User Schedule>  chlorhexidine 2% Cloths 1 Application(s) Topical daily  chlorhexidine 4% Liquid 1 Application(s) Topical <User Schedule>  cholecalciferol 1000 Unit(s) Oral daily  dextrose 40% Gel 15 Gram(s) Oral once  dextrose 5%. 1000 milliLiter(s) (50 mL/Hr) IV Continuous <Continuous>  dextrose 5%. 1000 milliLiter(s) (100 mL/Hr) IV Continuous <Continuous>  dextrose 50% Injectable 25 Gram(s) IV Push once  dextrose 50% Injectable 12.5 Gram(s) IV Push once  dextrose 50% Injectable 25 Gram(s) IV Push once  epoetin mari-epbx (RETACRIT) Injectable 33767 Unit(s) IV Push once  folic acid 1 milliGRAM(s) Oral daily  glucagon  Injectable 1 milliGRAM(s) IntraMuscular once  insulin glargine Injectable (LANTUS) 6 Unit(s) SubCutaneous at bedtime  insulin lispro (ADMELOG) corrective regimen sliding scale   SubCutaneous every 6 hours  levothyroxine 25 MICROGram(s) Oral daily  metoprolol tartrate 25 milliGRAM(s) Oral two times a day  midodrine 20 milliGRAM(s) Oral every 8 hours  midodrine. 10 milliGRAM(s) Oral <User Schedule>  Nephro-celeste 1 Tablet(s) Oral daily  pantoprazole  Injectable 40 milliGRAM(s) IV Push daily  piperacillin/tazobactam IVPB.. 3.375 Gram(s) IV Intermittent every 12 hours  polyethylene glycol 3350 17 Gram(s) Oral two times a day  QUEtiapine 25 milliGRAM(s) Oral at bedtime  senna 2 Tablet(s) Oral at bedtime  sevelamer carbonate Powder 1600 milliGRAM(s) Oral three times a day with meals  trihexyphenidyl 2 milliGRAM(s) Oral three times a day    MEDICATIONS  (PRN):  acetaminophen     Tablet .. 650 milliGRAM(s) Oral every 6 hours PRN Mild Pain (1 - 3)  albuterol/ipratropium for Nebulization 3 milliLiter(s) Nebulizer every 6 hours PRN Shortness of Breath and/or Wheezing  guaiFENesin Oral Liquid (Sugar-Free) 200 milliGRAM(s) Oral every 6 hours PRN Cough  oxyCODONE    IR 5 milliGRAM(s) Oral every 4 hours PRN Severe Pain (7 - 10)  sodium chloride 0.9% lock flush 10 milliLiter(s) IV Push every 1 hour PRN Pre/post blood products, medications, blood draw, and to maintain line patency      Care Discussed with Consultants/Other Providers [x] YES  [ ] NO    Vital Signs Last 24 Hrs  T(C): 36.6 (01 Nov 2021 08:00), Max: 36.6 (01 Nov 2021 08:00)  T(F): 97.8 (01 Nov 2021 08:00), Max: 97.8 (01 Nov 2021 08:00)  HR: 114 (01 Nov 2021 10:00) (51 - 114)  BP: 108/56 (01 Nov 2021 10:00) (74/49 - 145/63)  BP(mean): 77 (01 Nov 2021 10:00) (57 - 91)  RR: 31 (01 Nov 2021 10:00) (11 - 35)  SpO2: 94% (01 Nov 2021 10:00) (94% - 100%)  I&O's Summary    31 Oct 2021 07:01  -  01 Nov 2021 07:00  --------------------------------------------------------  IN: 1287.9 mL / OUT: 0 mL / NET: 1287.9 mL    01 Nov 2021 07:01  -  01 Nov 2021 10:51  --------------------------------------------------------  IN: 50 mL / OUT: 0 mL / NET: 50 mL    PHYSICAL EXAM:  GENERAL: NAD, confused  HEAD:  Atraumatic, Normocephalic  CHEST/LUNG: CTABL, No wheeze  HEART: Regular rate and rhythm; no murmur  ABDOMEN: Soft, Nontender, distended; Bowel sounds present, s/p ngt (pt pulled out)  EXTREMITIES:  +1 le edema, right ankle wounds  skin: right neck shiley  NEURO: confused

## 2021-11-01 NOTE — PROGRESS NOTE ADULT - ASSESSMENT
72yo M w/ PMHx of CAD (s/p CABG 2019), CKD (unknown stage), DM2, Parkinson's Disease, HTN, depression presents with new onset acute heart failure exacerbation, NSTEMI, started on hep gtt, now with bl RP bleed, acute anemia. transferred to MICU.    # Acute systolic and diastolic heart failure  # NSTEMI  # ABBY on CKD  # Atrial flutter  # acute hypoxic resp failure  # hemorrhagic shock, left RP hematoma, acute blood loss anemia  # delirium  TTE mod to severe LV SD, hypokinesis anterior wall  sp shiley placement for HD, HD per renal  not candidate for cath at this point  hep gtt held 2/2 anemia  10/27 active RP bleeding, transferred to MICU, sp multiple transfusions DDAVP  10/28 IR for abd angiography and embolization l4 and l5 lumbar artery  transfuse PRN  on midodrine  vanco zosyn started for leukocytosis, CXR left pleural effusion vs pna  sp NGT  s/p precedex  appreciate ICU care    # DM2 (diabetes mellitus, type 2)  per endo  hba1c 6.4    # CAD (coronary artery disease)  c/w atorvastatin  asa on hold    # Parkinsons disease   # delirium  carbidopa/levodopa restarted  c/w trihexyphenidyl    PCP Dr. Simons

## 2021-11-01 NOTE — PROGRESS NOTE ADULT - SUBJECTIVE AND OBJECTIVE BOX
Chief Complaint:  Patient is a 71y old  Male who presents with a chief complaint of leg swelling (01 Nov 2021 10:50)      Date of service 11-01-21 @ 11:44      Interval Events:     Hospital Medications:  acetaminophen     Tablet .. 650 milliGRAM(s) Oral every 6 hours PRN  albuterol/ipratropium for Nebulization 3 milliLiter(s) Nebulizer every 6 hours PRN  ascorbic acid 500 milliGRAM(s) Oral daily  atorvastatin 80 milliGRAM(s) Oral at bedtime  carbidopa/levodopa  25/100 2.5 Tablet(s) Oral <User Schedule>  carbidopa/levodopa  25/100 2 Tablet(s) Oral <User Schedule>  chlorhexidine 2% Cloths 1 Application(s) Topical daily  chlorhexidine 4% Liquid 1 Application(s) Topical <User Schedule>  cholecalciferol 1000 Unit(s) Oral daily  dextrose 40% Gel 15 Gram(s) Oral once  dextrose 5%. 1000 milliLiter(s) IV Continuous <Continuous>  dextrose 5%. 1000 milliLiter(s) IV Continuous <Continuous>  dextrose 50% Injectable 25 Gram(s) IV Push once  dextrose 50% Injectable 12.5 Gram(s) IV Push once  dextrose 50% Injectable 25 Gram(s) IV Push once  epoetin mari-epbx (RETACRIT) Injectable 21409 Unit(s) IV Push once  folic acid 1 milliGRAM(s) Oral daily  glucagon  Injectable 1 milliGRAM(s) IntraMuscular once  guaiFENesin Oral Liquid (Sugar-Free) 200 milliGRAM(s) Oral every 6 hours PRN  insulin glargine Injectable (LANTUS) 6 Unit(s) SubCutaneous at bedtime  insulin lispro (ADMELOG) corrective regimen sliding scale   SubCutaneous every 6 hours  levothyroxine 25 MICROGram(s) Oral daily  metoprolol tartrate 25 milliGRAM(s) Oral two times a day  midodrine 20 milliGRAM(s) Oral every 8 hours  midodrine. 10 milliGRAM(s) Oral <User Schedule>  Nephro-celeste 1 Tablet(s) Oral daily  oxyCODONE    IR 5 milliGRAM(s) Oral every 4 hours PRN  pantoprazole  Injectable 40 milliGRAM(s) IV Push daily  piperacillin/tazobactam IVPB.. 3.375 Gram(s) IV Intermittent every 12 hours  polyethylene glycol 3350 17 Gram(s) Oral two times a day  QUEtiapine 25 milliGRAM(s) Oral at bedtime  senna 2 Tablet(s) Oral at bedtime  sevelamer carbonate Powder 1600 milliGRAM(s) Oral three times a day with meals  sodium chloride 0.9% lock flush 10 milliLiter(s) IV Push every 1 hour PRN  trihexyphenidyl 2 milliGRAM(s) Oral three times a day        Review of Systems:  General:  No wt loss, fevers, chills, night sweats, fatigue,   Eyes:  Good vision, no reported pain  ENT:  No sore throat, pain, runny nose, dysphagia  CV:  No pain, palpitations, hypo/hypertension  Resp:  No dyspnea, cough, tachypnea, wheezing  GI:  See HPI  :  No pain, bleeding, incontinence, nocturia  Muscle:  No pain, weakness  Neuro:  No weakness, tingling, memory problems  Psych:  No fatigue, insomnia, mood problems, depression  Endocrine:  No polyuria, polydipsia, cold/heat intolerance  Heme:  No petechiae, ecchymosis, easy bruisability  Integumentary:  No rash, edema    PHYSICAL EXAM:   Vital Signs:  Vital Signs Last 24 Hrs  T(C): 36.6 (01 Nov 2021 08:00), Max: 36.6 (01 Nov 2021 08:00)  T(F): 97.8 (01 Nov 2021 08:00), Max: 97.8 (01 Nov 2021 08:00)  HR: 115 (01 Nov 2021 11:00) (53 - 115)  BP: 110/55 (01 Nov 2021 11:00) (74/49 - 131/57)  BP(mean): 75 (01 Nov 2021 11:00) (57 - 88)  RR: 19 (01 Nov 2021 11:00) (12 - 35)  SpO2: 96% (01 Nov 2021 11:00) (94% - 100%)  Daily     Daily       PHYSICAL EXAM:     GENERAL:  Appears stated age, well-groomed, well-nourished, no distress  HEENT:  NC/AT,  conjunctivae anicteric, clear and pink,   NECK: supple, trachea midline  CHEST:  Full & symmetric excursion, no increased effort, breath sounds clear  HEART:  Regular rhythm, no JVD  ABDOMEN:  Soft, non-tender, non-distended, normoactive bowel sounds,  no masses , no hepatosplenomegaly  EXTREMITIES:  no cyanosis,clubbing or edema  SKIN:  No rash, erythema, or, ecchymoses, no jaundice  NEURO:  Alert, non-focal, no asterixis  PSYCH: Appropriate affect, oriented to place and time  RECTAL: Deferred      LABS Personally reviewed by me:                        7.2    17.41 )-----------( 173      ( 01 Nov 2021 09:09 )             23.1     Mean Cell Volume: 99.6 fl (11-01-21 @ 09:09)    11-01    131<L>  |  93<L>  |  72<H>  ----------------------------<  154<H>  5.0   |  20<L>  |  5.31<H>    Ca    8.8      01 Nov 2021 00:14  Phos  6.2     11-01  Mg     2.4     11-01    TPro  6.2  /  Alb  2.3<L>  /  TBili  1.5<H>  /  DBili  x   /  AST  42<H>  /  ALT  6<L>  /  AlkPhos  72  11-01    LIVER FUNCTIONS - ( 01 Nov 2021 00:14 )  Alb: 2.3 g/dL / Pro: 6.2 g/dL / ALK PHOS: 72 U/L / ALT: 6 U/L / AST: 42 U/L / GGT: x           PT/INR - ( 01 Nov 2021 00:15 )   PT: 12.8 sec;   INR: 1.07 ratio         PTT - ( 01 Nov 2021 00:15 )  PTT:50.9 sec                            7.2    17.41 )-----------( 173      ( 01 Nov 2021 09:09 )             23.1                         7.2    17.83 )-----------( 149      ( 01 Nov 2021 00:14 )             21.8                         7.4    17.13 )-----------( 151      ( 31 Oct 2021 15:43 )             22.9                         7.3    17.48 )-----------( 140      ( 31 Oct 2021 11:59 )             22.4                         7.8    16.66 )-----------( 155      ( 31 Oct 2021 01:00 )             24.2       Imaging personally reviewed by me:

## 2021-11-01 NOTE — PROGRESS NOTE ADULT - SUBJECTIVE AND OBJECTIVE BOX
[facility] [phone]        Patient seen and examined in bed.  He was confused and pulled out the NG tube         MEDICATIONS  (STANDING):  ascorbic acid 500 milliGRAM(s) Oral daily  atorvastatin 80 milliGRAM(s) Oral at bedtime  carbidopa/levodopa  25/100 2.5 Tablet(s) Oral <User Schedule>  carbidopa/levodopa  25/100 2 Tablet(s) Oral <User Schedule>  chlorhexidine 2% Cloths 1 Application(s) Topical daily  chlorhexidine 4% Liquid 1 Application(s) Topical <User Schedule>  cholecalciferol 1000 Unit(s) Oral daily  dextrose 40% Gel 15 Gram(s) Oral once  dextrose 5%. 1000 milliLiter(s) (50 mL/Hr) IV Continuous <Continuous>  dextrose 5%. 1000 milliLiter(s) (100 mL/Hr) IV Continuous <Continuous>  dextrose 50% Injectable 25 Gram(s) IV Push once  dextrose 50% Injectable 12.5 Gram(s) IV Push once  dextrose 50% Injectable 25 Gram(s) IV Push once  folic acid 1 milliGRAM(s) Oral daily  glucagon  Injectable 1 milliGRAM(s) IntraMuscular once  insulin glargine Injectable (LANTUS) 6 Unit(s) SubCutaneous at bedtime  insulin lispro (ADMELOG) corrective regimen sliding scale   SubCutaneous every 6 hours  levothyroxine 25 MICROGram(s) Oral daily  metoprolol tartrate 25 milliGRAM(s) Oral two times a day  midodrine 20 milliGRAM(s) Oral every 8 hours  midodrine. 10 milliGRAM(s) Oral <User Schedule>  Nephro-celeste 1 Tablet(s) Oral daily  pantoprazole  Injectable 40 milliGRAM(s) IV Push daily  piperacillin/tazobactam IVPB.. 3.375 Gram(s) IV Intermittent every 12 hours  polyethylene glycol 3350 17 Gram(s) Oral two times a day  QUEtiapine 25 milliGRAM(s) Oral at bedtime  senna 2 Tablet(s) Oral at bedtime  sevelamer carbonate Powder 1600 milliGRAM(s) Oral three times a day with meals  trihexyphenidyl 2 milliGRAM(s) Oral three times a day      VITAL:  T(C): , Max: 36.6 (11-01-21 @ 08:00)  T(F): , Max: 97.8 (11-01-21 @ 08:00)  HR: 114 (11-01-21 @ 10:00)  BP: 108/56 (11-01-21 @ 10:00)  BP(mean): 77 (11-01-21 @ 10:00)  RR: 31 (11-01-21 @ 10:00)  SpO2: 94% (11-01-21 @ 10:00)  Wt(kg): --    I and O's:    10-31 @ 07:01  -  11-01 @ 07:00  --------------------------------------------------------  IN: 1287.9 mL / OUT: 0 mL / NET: 1287.9 mL    11-01 @ 07:01  -  11-01 @ 10:40  --------------------------------------------------------  IN: 50 mL / OUT: 0 mL / NET: 50 mL          PHYSICAL EXAM:    Constitutional: NAD; confused   Neck:  No JVD  Respiratory: CTAB/L  Cardiovascular: S1 and S2  Gastrointestinal: BS+, soft, NT/ND  Extremities: No peripheral edema  Neurological:  no focal deficits  Psychiatric:    : No Javier  Skin: No rashes  Access: [facility]     LABS:                        7.2    17.41 )-----------( 173      ( 01 Nov 2021 09:09 )             23.1     11-01    131<L>  |  93<L>  |  72<H>  ----------------------------<  154<H>  5.0   |  20<L>  |  5.31<H>    Ca    8.8      01 Nov 2021 00:14  Phos  6.2     11-01  Mg     2.4     11-01    TPro  6.2  /  Alb  2.3<L>  /  TBili  1.5<H>  /  DBili  x   /  AST  42<H>  /  ALT  6<L>  /  AlkPhos  72  11-01          Urine Studies:          RADIOLOGY & ADDITIONAL STUDIES:

## 2021-11-01 NOTE — PROGRESS NOTE ADULT - ASSESSMENT
71 M w volume overload, GI consulted for drop in hgb    1. Volume overload per cardio    2. ABBY on CKD per renal    3. Drop in hgb, no overt GI Bleed.     4. RP bleed  s/p Abdominal Angiography and Embolization on 10/29/2021 by Interventional radiology of l4and l5 lumbar artery  - h/h overall stable    5. abdominal distention  - AXR nonobstructive, give miralax BID    6. Elevated bilirubin likely due to hematoma

## 2021-11-01 NOTE — PROGRESS NOTE ADULT - ATTENDING COMMENTS
72yo M w/ PMHx of CAD (s/p CABG 2019), CKD (unknown stage), DM2, Parkinson's Disease, HTN, depression presents with new onset acute heart failure exacerbation, NSTEMI, started on hep gtt, now with bl RP bleed, acute anemia. transferred to MICU.    # Acute systolic and diastolic heart failure  # NSTEMI  # ABBY on CKD  # Atrial flutter  # acute hypoxic resp failure  # hemorrhagic shock, left RP hematoma, acute blood loss anemia  TTE mod to severe LV SD, hypokinesis anterior wall, not candidate for cath at this point  sp shiley placement for HD, HD per renal  10/27 active RP bleeding, transferred to MICU, sp multiple transfusions DDAVP  10/28 sp IR embolization l4 and l5 lumbar artery  transfuse PRN, monitor h/h, if no improvement, repeat CTA  on midodrine, requiring pressors during dialysis  vanco zosyn empirically, fu cultures  sp NGT  appreciate ICU care    # DM2 (diabetes mellitus, type 2)  per endo  hba1c 6.4    # CAD (coronary artery disease)  c/w atorvastatin  asa on hold    # Parkinsons disease   # delirium  carbidopa/levodopa restarted  c/w trihexyphenidyl  seroquel    PCP Dr. Simons

## 2021-11-01 NOTE — PROGRESS NOTE ADULT - ATTENDING COMMENTS
1. RP bleed bilaterally. L RP bleed embolized by IR. H/H stable after PRBC last night. H/H q 8-12 hrs.  If H/H further decreases. Will need to repeat CTA.H/H relatively stable. Still needs pressors for HD. Will additional 10mg midodrine 30 minutes before HD.  2.NSTEMI and aflutter. Initially placed on heparin and antiplatelet medication. All stopped for RP bleed. HR  50-60s. on lopressor.  3.ABBY from ATN . Now HD Pt  becomes hypotensive on HD and needs pressors intermittently. Off Levophed since 10pm. Add midodrine 10mg 30 min before HD.  4. Delirium: Multifactorial. Fever, hypotension, ICU. Pt now in  room with windows. Continue Precedex. Avoid ativan and versed.  today. Continue Seroquel 25mg at night. Still delirious but more coherent today. Mental status waxes and wanes.  5.Parkinson's disease.  Continue Sinemet.  6. Fevers with increased wbc. WBC stable ~16. Awaiting culture results. If all cx negative then stop  on vancomycin and zosyn. 1. RP bleed bilaterally. L RP bleed embolized by IR. No  PRBC last 24 hrs..  If H/H further decreases. Will need to repeat CTA. H/H relatively stable. Still needs pressors for HD. Will additional 10mg midodrine 30 minutes before HD.  2.NSTEMI and aflutter. Initially placed on heparin and antiplatelet medication. All stopped for RP bleed. HR  50-60s. on lopressor.  3.ABBY from ATN . Now HD Pt  becomes hypotensive on HD and needs pressors intermittently. Off Levophed since 10pm. Add midodrine 10mg 30 min before HD.  4. Delirium: Multifactorial. Fever, hypotension, ICU. Pt now in  room with windows. Continue Precedex. Avoid ativan and versed.  today. Continue Seroquel 25mg at night. Still delirious but more coherent today. Mental status waxes and wanes.  5.Parkinson's disease.  Continue Sinemet.  6. Fevers with increased wbc. WBC stable ~16. Awaiting culture results. If all cx negative then stop  on vancomycin and zosyn.

## 2021-11-01 NOTE — DIETITIAN NUTRITION RISK NOTIFICATION - TREATMENT: THE FOLLOWING DIET HAS BEEN RECOMMENDED
Diet, NPO with Tube Feed:   Tube Feeding Modality: Nasogastric  Nepro with Carb Steady (NEPRORTH)  Continuous  Starting Tube Feed Rate {mL per Hour}: 10  Increase Tube Feed Rate by (mL): 10     Every 4 hours  Until Goal Tube Feed Rate (mL per Hour): 20  Tube Feed Duration (in Hours): 24  Tube Feed Start Time: 11:00  Tube Feed Stop Time: 05:00 (10-31-21 @ 10:18) [Active]

## 2021-11-01 NOTE — PROGRESS NOTE ADULT - SUBJECTIVE AND OBJECTIVE BOX
INTERVAL HPI/OVERNIGHT EVENTS:    SUBJECTIVE: Patient seen and examined at bedside.       VITAL SIGNS:  ICU Vital Signs Last 24 Hrs  T(C): 36.4 (01 Nov 2021 00:00), Max: 36.5 (31 Oct 2021 16:00)  T(F): 97.6 (01 Nov 2021 00:00), Max: 97.7 (31 Oct 2021 16:00)  HR: 83 (01 Nov 2021 07:00) (51 - 112)  BP: 88/54 (01 Nov 2021 07:15) (74/49 - 145/63)  BP(mean): 67 (01 Nov 2021 07:15) (57 - 91)  ABP: --  ABP(mean): --  RR: 32 (01 Nov 2021 07:00) (11 - 34)  SpO2: 100% (01 Nov 2021 07:00) (95% - 100%)      Plateau pressure:   P/F ratio:     10-31 @ 07:01  -  11-01 @ 07:00  --------------------------------------------------------  IN: 1287.9 mL / OUT: 0 mL / NET: 1287.9 mL      CAPILLARY BLOOD GLUCOSE      POCT Blood Glucose.: 175 mg/dL (01 Nov 2021 05:22)    ECG:    PHYSICAL EXAM:    General:   HEENT:   Neck:   Respiratory:   Cardiovascular:   Abdomen:   Extremities:  Neurological:    MEDICATIONS:  MEDICATIONS  (STANDING):  ascorbic acid 500 milliGRAM(s) Oral daily  atorvastatin 80 milliGRAM(s) Oral at bedtime  carbidopa/levodopa  25/100 2.5 Tablet(s) Oral <User Schedule>  carbidopa/levodopa  25/100 2 Tablet(s) Oral <User Schedule>  chlorhexidine 2% Cloths 1 Application(s) Topical daily  chlorhexidine 4% Liquid 1 Application(s) Topical <User Schedule>  cholecalciferol 1000 Unit(s) Oral daily  dexMEDEtomidine Infusion 0.75 MICROgram(s)/kG/Hr (18.1 mL/Hr) IV Continuous <Continuous>  dextrose 40% Gel 15 Gram(s) Oral once  dextrose 5%. 1000 milliLiter(s) (50 mL/Hr) IV Continuous <Continuous>  dextrose 5%. 1000 milliLiter(s) (100 mL/Hr) IV Continuous <Continuous>  dextrose 50% Injectable 25 Gram(s) IV Push once  dextrose 50% Injectable 12.5 Gram(s) IV Push once  dextrose 50% Injectable 25 Gram(s) IV Push once  folic acid 1 milliGRAM(s) Oral daily  glucagon  Injectable 1 milliGRAM(s) IntraMuscular once  insulin glargine Injectable (LANTUS) 6 Unit(s) SubCutaneous at bedtime  insulin lispro (ADMELOG) corrective regimen sliding scale   SubCutaneous every 6 hours  levothyroxine 25 MICROGram(s) Oral daily  metoprolol tartrate 25 milliGRAM(s) Oral two times a day  midodrine 20 milliGRAM(s) Oral every 8 hours  Nephro-celeste 1 Tablet(s) Oral daily  pantoprazole  Injectable 40 milliGRAM(s) IV Push daily  piperacillin/tazobactam IVPB.. 3.375 Gram(s) IV Intermittent every 12 hours  polyethylene glycol 3350 17 Gram(s) Oral two times a day  QUEtiapine 25 milliGRAM(s) Oral at bedtime  senna 2 Tablet(s) Oral at bedtime  sevelamer carbonate Powder 1600 milliGRAM(s) Oral three times a day with meals  trihexyphenidyl 2 milliGRAM(s) Oral three times a day    MEDICATIONS  (PRN):  acetaminophen     Tablet .. 650 milliGRAM(s) Oral every 6 hours PRN Mild Pain (1 - 3)  albuterol/ipratropium for Nebulization 3 milliLiter(s) Nebulizer every 6 hours PRN Shortness of Breath and/or Wheezing  guaiFENesin Oral Liquid (Sugar-Free) 200 milliGRAM(s) Oral every 6 hours PRN Cough  oxyCODONE    IR 5 milliGRAM(s) Oral every 4 hours PRN Severe Pain (7 - 10)  sodium chloride 0.9% lock flush 10 milliLiter(s) IV Push every 1 hour PRN Pre/post blood products, medications, blood draw, and to maintain line patency      ALLERGIES:  Allergies    adhesives (Rash)  latex (Urticaria)  No Known Drug Allergies    Intolerances        LABS:                        7.2    17.83 )-----------( 149      ( 01 Nov 2021 00:14 )             21.8     11-01    131<L>  |  93<L>  |  72<H>  ----------------------------<  154<H>  5.0   |  20<L>  |  5.31<H>    Ca    8.8      01 Nov 2021 00:14  Phos  6.2     11-01  Mg     2.4     11-01    TPro  6.2  /  Alb  2.3<L>  /  TBili  1.5<H>  /  DBili  x   /  AST  42<H>  /  ALT  6<L>  /  AlkPhos  72  11-01    PT/INR - ( 01 Nov 2021 00:15 )   PT: 12.8 sec;   INR: 1.07 ratio         PTT - ( 01 Nov 2021 00:15 )  PTT:50.9 sec      RADIOLOGY & ADDITIONAL TESTS: Reviewed.   INTERVAL HPI/OVERNIGHT EVENTS:    SUBJECTIVE: Patient seen and examined at bedside. Overnight patient pulled his NGT. This morning, patient was A&O 1-2, intermittently following commands.      VITAL SIGNS:  ICU Vital Signs Last 24 Hrs  T(C): 36.4 (01 Nov 2021 00:00), Max: 36.5 (31 Oct 2021 16:00)  T(F): 97.6 (01 Nov 2021 00:00), Max: 97.7 (31 Oct 2021 16:00)  HR: 83 (01 Nov 2021 07:00) (51 - 112)  BP: 88/54 (01 Nov 2021 07:15) (74/49 - 145/63)  BP(mean): 67 (01 Nov 2021 07:15) (57 - 91)  ABP: --  ABP(mean): --  RR: 32 (01 Nov 2021 07:00) (11 - 34)  SpO2: 100% (01 Nov 2021 07:00) (95% - 100%)      Plateau pressure:   P/F ratio:     10-31 @ 07:01  -  11-01 @ 07:00  --------------------------------------------------------  IN: 1287.9 mL / OUT: 0 mL / NET: 1287.9 mL      CAPILLARY BLOOD GLUCOSE      POCT Blood Glucose.: 175 mg/dL (01 Nov 2021 05:22)    ECG:    PHYSICAL EXAM:    General:   GENERAL: NAD, AAOx2  HEAD:  Atraumatic, Normocephalic  CHEST/LUNG: CTABL, Wheezing present on middle lung fields  HEART: RRR  ABDOMEN: Soft, Nontender, Nondistended; Bowel sounds present  EXTREMITIES:  2+ Peripheral Pulses, No clubbing, cyanosis, or edema  NEURO: No focal deficits    MEDICATIONS:  MEDICATIONS  (STANDING):  ascorbic acid 500 milliGRAM(s) Oral daily  atorvastatin 80 milliGRAM(s) Oral at bedtime  carbidopa/levodopa  25/100 2.5 Tablet(s) Oral <User Schedule>  carbidopa/levodopa  25/100 2 Tablet(s) Oral <User Schedule>  chlorhexidine 2% Cloths 1 Application(s) Topical daily  chlorhexidine 4% Liquid 1 Application(s) Topical <User Schedule>  cholecalciferol 1000 Unit(s) Oral daily  dexMEDEtomidine Infusion 0.75 MICROgram(s)/kG/Hr (18.1 mL/Hr) IV Continuous <Continuous>  dextrose 40% Gel 15 Gram(s) Oral once  dextrose 5%. 1000 milliLiter(s) (50 mL/Hr) IV Continuous <Continuous>  dextrose 5%. 1000 milliLiter(s) (100 mL/Hr) IV Continuous <Continuous>  dextrose 50% Injectable 25 Gram(s) IV Push once  dextrose 50% Injectable 12.5 Gram(s) IV Push once  dextrose 50% Injectable 25 Gram(s) IV Push once  folic acid 1 milliGRAM(s) Oral daily  glucagon  Injectable 1 milliGRAM(s) IntraMuscular once  insulin glargine Injectable (LANTUS) 6 Unit(s) SubCutaneous at bedtime  insulin lispro (ADMELOG) corrective regimen sliding scale   SubCutaneous every 6 hours  levothyroxine 25 MICROGram(s) Oral daily  metoprolol tartrate 25 milliGRAM(s) Oral two times a day  midodrine 20 milliGRAM(s) Oral every 8 hours  Nephro-celeste 1 Tablet(s) Oral daily  pantoprazole  Injectable 40 milliGRAM(s) IV Push daily  piperacillin/tazobactam IVPB.. 3.375 Gram(s) IV Intermittent every 12 hours  polyethylene glycol 3350 17 Gram(s) Oral two times a day  QUEtiapine 25 milliGRAM(s) Oral at bedtime  senna 2 Tablet(s) Oral at bedtime  sevelamer carbonate Powder 1600 milliGRAM(s) Oral three times a day with meals  trihexyphenidyl 2 milliGRAM(s) Oral three times a day    MEDICATIONS  (PRN):  acetaminophen     Tablet .. 650 milliGRAM(s) Oral every 6 hours PRN Mild Pain (1 - 3)  albuterol/ipratropium for Nebulization 3 milliLiter(s) Nebulizer every 6 hours PRN Shortness of Breath and/or Wheezing  guaiFENesin Oral Liquid (Sugar-Free) 200 milliGRAM(s) Oral every 6 hours PRN Cough  oxyCODONE    IR 5 milliGRAM(s) Oral every 4 hours PRN Severe Pain (7 - 10)  sodium chloride 0.9% lock flush 10 milliLiter(s) IV Push every 1 hour PRN Pre/post blood products, medications, blood draw, and to maintain line patency      ALLERGIES:  Allergies    adhesives (Rash)  latex (Urticaria)  No Known Drug Allergies    Intolerances        LABS:                        7.2    17.83 )-----------( 149      ( 01 Nov 2021 00:14 )             21.8     11-01    131<L>  |  93<L>  |  72<H>  ----------------------------<  154<H>  5.0   |  20<L>  |  5.31<H>    Ca    8.8      01 Nov 2021 00:14  Phos  6.2     11-01  Mg     2.4     11-01    TPro  6.2  /  Alb  2.3<L>  /  TBili  1.5<H>  /  DBili  x   /  AST  42<H>  /  ALT  6<L>  /  AlkPhos  72  11-01    PT/INR - ( 01 Nov 2021 00:15 )   PT: 12.8 sec;   INR: 1.07 ratio         PTT - ( 01 Nov 2021 00:15 )  PTT:50.9 sec      RADIOLOGY & ADDITIONAL TESTS: Reviewed.

## 2021-11-02 NOTE — PROGRESS NOTE ADULT - SUBJECTIVE AND OBJECTIVE BOX
Chief complaint  Patient is a 71y old  Male who presents with a chief complaint of leg swelling (02 Nov 2021 12:27)   Review of systems  Patient in bed, looks comfortable, no hypoglycemic episodes.    Labs and Fingersticks  CAPILLARY BLOOD GLUCOSE      POCT Blood Glucose.: 255 mg/dL (02 Nov 2021 11:00)  POCT Blood Glucose.: 218 mg/dL (02 Nov 2021 05:39)  POCT Blood Glucose.: 199 mg/dL (02 Nov 2021 00:06)  POCT Blood Glucose.: 162 mg/dL (01 Nov 2021 18:34)      Anion Gap, Serum: 15 (11-02 @ 01:23)  Anion Gap, Serum: 18 *H* (11-01 @ 00:14)      Calcium, Total Serum: 8.7 (11-02 @ 01:23)  Calcium, Total Serum: 8.8 (11-01 @ 00:14)  Albumin, Serum: 2.2 *L* (11-02 @ 01:23)  Albumin, Serum: 2.3 *L* (11-01 @ 00:14)    Alanine Aminotransferase (ALT/SGPT): 6 *L* (11-02 @ 01:23)  Alanine Aminotransferase (ALT/SGPT): 6 *L* (11-01 @ 00:14)  Alkaline Phosphatase, Serum: 103 (11-02 @ 01:23)  Alkaline Phosphatase, Serum: 72 (11-01 @ 00:14)  Aspartate Aminotransferase (AST/SGOT): 34 (11-02 @ 01:23)  Aspartate Aminotransferase (AST/SGOT): 42 *H* (11-01 @ 00:14)        11-02    136  |  98  |  45<H>  ----------------------------<  176<H>  3.8   |  23  |  3.91<H>    Ca    8.7      02 Nov 2021 01:23  Phos  3.8     11-02  Mg     2.2     11-02    TPro  5.8<L>  /  Alb  2.2<L>  /  TBili  1.9<H>  /  DBili  x   /  AST  34  /  ALT  6<L>  /  AlkPhos  103  11-02                        6.7    14.10 )-----------( 174      ( 02 Nov 2021 01:24 )             20.4     Medications  MEDICATIONS  (STANDING):  ascorbic acid 500 milliGRAM(s) Oral daily  atorvastatin 80 milliGRAM(s) Oral at bedtime  carbidopa/levodopa  25/100 2.5 Tablet(s) Oral <User Schedule>  carbidopa/levodopa  25/100 2 Tablet(s) Oral <User Schedule>  chlorhexidine 4% Liquid 1 Application(s) Topical <User Schedule>  cholecalciferol 1000 Unit(s) Oral daily  dextrose 40% Gel 15 Gram(s) Oral once  dextrose 5%. 1000 milliLiter(s) (50 mL/Hr) IV Continuous <Continuous>  dextrose 5%. 1000 milliLiter(s) (100 mL/Hr) IV Continuous <Continuous>  dextrose 50% Injectable 25 Gram(s) IV Push once  dextrose 50% Injectable 12.5 Gram(s) IV Push once  dextrose 50% Injectable 25 Gram(s) IV Push once  folic acid 1 milliGRAM(s) Oral daily  glucagon  Injectable 1 milliGRAM(s) IntraMuscular once  insulin glargine Injectable (LANTUS) 6 Unit(s) SubCutaneous at bedtime  insulin lispro (ADMELOG) corrective regimen sliding scale   SubCutaneous every 6 hours  levothyroxine 25 MICROGram(s) Oral daily  metoprolol tartrate 25 milliGRAM(s) Oral two times a day  midodrine 20 milliGRAM(s) Oral every 8 hours  midodrine. 20 milliGRAM(s) Oral <User Schedule>  Nephro-celeste 1 Tablet(s) Oral daily  pantoprazole  Injectable 40 milliGRAM(s) IV Push daily  polyethylene glycol 3350 17 Gram(s) Oral two times a day  QUEtiapine 25 milliGRAM(s) Oral at bedtime  senna Syrup 10 milliLiter(s) Oral at bedtime  sevelamer carbonate Powder 1600 milliGRAM(s) Oral three times a day with meals  trihexyphenidyl 2 milliGRAM(s) Oral three times a day      Physical Exam  General: Patient comfortable in bed  Vital Signs Last 12 Hrs  T(F): 97.2 (11-02-21 @ 08:00), Max: 97.2 (11-02-21 @ 08:00)  HR: 81 (11-02-21 @ 12:00) (58 - 117)  BP: 129/61 (11-02-21 @ 12:00) (90/52 - 133/71)  BP(mean): 88 (11-02-21 @ 12:00) (65 - 105)  RR: 30 (11-02-21 @ 12:00) (17 - 31)  SpO2: 99% (11-02-21 @ 12:00) (94% - 100%)      Diagnostics    Free Thyroxine, Serum: AM Sched. Collection: 26-Oct-2021 06:00 (10-25 @ 11:45)  Thyroid Stimulating Hormone, Serum: AM Sched. Collection: 26-Oct-2021 06:00 (10-25 @ 11:45)  Cortisol AM, Serum: 08:00 (10-22 @ 12:47)  Free Thyroxine, Serum: AM Sched. Collection: 23-Oct-2021 06:00 (10-22 @ 12:43)  Thyroid Stimulating Hormone, Serum: AM Sched. Collection: 23-Oct-2021 06:00 (10-22 @ 12:43)           Chief complaint  Patient is a 71y old  Male who presents with a chief complaint of leg swelling (02 Nov 2021 12:27)   Review of systems  Patient in bed, looks comfortable, no hypoglycemic episodes.    Labs and Fingersticks  CAPILLARY BLOOD GLUCOSE      POCT Blood Glucose.: 255 mg/dL (02 Nov 2021 11:00)  POCT Blood Glucose.: 218 mg/dL (02 Nov 2021 05:39)  POCT Blood Glucose.: 199 mg/dL (02 Nov 2021 00:06)  POCT Blood Glucose.: 162 mg/dL (01 Nov 2021 18:34)      Anion Gap, Serum: 15 (11-02 @ 01:23)  Anion Gap, Serum: 18 *H* (11-01 @ 00:14)      Calcium, Total Serum: 8.7 (11-02 @ 01:23)  Calcium, Total Serum: 8.8 (11-01 @ 00:14)  Albumin, Serum: 2.2 *L* (11-02 @ 01:23)  Albumin, Serum: 2.3 *L* (11-01 @ 00:14)    Alanine Aminotransferase (ALT/SGPT): 6 *L* (11-02 @ 01:23)  Alanine Aminotransferase (ALT/SGPT): 6 *L* (11-01 @ 00:14)  Alkaline Phosphatase, Serum: 103 (11-02 @ 01:23)  Alkaline Phosphatase, Serum: 72 (11-01 @ 00:14)  Aspartate Aminotransferase (AST/SGOT): 34 (11-02 @ 01:23)  Aspartate Aminotransferase (AST/SGOT): 42 *H* (11-01 @ 00:14)        11-02    136  |  98  |  45<H>  ----------------------------<  176<H>  3.8   |  23  |  3.91<H>    Ca    8.7      02 Nov 2021 01:23  Phos  3.8     11-02  Mg     2.2     11-02    TPro  5.8<L>  /  Alb  2.2<L>  /  TBili  1.9<H>  /  DBili  x   /  AST  34  /  ALT  6<L>  /  AlkPhos  103  11-02                        6.7    14.10 )-----------( 174      ( 02 Nov 2021 01:24 )             20.4     Medications  MEDICATIONS  (STANDING):  ascorbic acid 500 milliGRAM(s) Oral daily  atorvastatin 80 milliGRAM(s) Oral at bedtime  carbidopa/levodopa  25/100 2.5 Tablet(s) Oral <User Schedule>  carbidopa/levodopa  25/100 2 Tablet(s) Oral <User Schedule>  chlorhexidine 4% Liquid 1 Application(s) Topical <User Schedule>  cholecalciferol 1000 Unit(s) Oral daily  dextrose 40% Gel 15 Gram(s) Oral once  dextrose 5%. 1000 milliLiter(s) (50 mL/Hr) IV Continuous <Continuous>  dextrose 5%. 1000 milliLiter(s) (100 mL/Hr) IV Continuous <Continuous>  dextrose 50% Injectable 25 Gram(s) IV Push once  dextrose 50% Injectable 12.5 Gram(s) IV Push once  dextrose 50% Injectable 25 Gram(s) IV Push once  folic acid 1 milliGRAM(s) Oral daily  glucagon  Injectable 1 milliGRAM(s) IntraMuscular once  insulin glargine Injectable (LANTUS) 6 Unit(s) SubCutaneous at bedtime  insulin lispro (ADMELOG) corrective regimen sliding scale   SubCutaneous every 6 hours  levothyroxine 25 MICROGram(s) Oral daily  metoprolol tartrate 25 milliGRAM(s) Oral two times a day  midodrine 20 milliGRAM(s) Oral every 8 hours  midodrine. 20 milliGRAM(s) Oral <User Schedule>  Nephro-celeste 1 Tablet(s) Oral daily  pantoprazole  Injectable 40 milliGRAM(s) IV Push daily  polyethylene glycol 3350 17 Gram(s) Oral two times a day  QUEtiapine 25 milliGRAM(s) Oral at bedtime  senna Syrup 10 milliLiter(s) Oral at bedtime  sevelamer carbonate Powder 1600 milliGRAM(s) Oral three times a day with meals  trihexyphenidyl 2 milliGRAM(s) Oral three times a day      Physical Exam  General: Patient comfortable in bed  Vital Signs Last 12 Hrs  T(F): 97.2 (11-02-21 @ 08:00), Max: 97.2 (11-02-21 @ 08:00)  HR: 81 (11-02-21 @ 12:00) (58 - 117)  BP: 129/61 (11-02-21 @ 12:00) (90/52 - 133/71)  BP(mean): 88 (11-02-21 @ 12:00) (65 - 105)  RR: 30 (11-02-21 @ 12:00) (17 - 31)  SpO2: 99% (11-02-21 @ 12:00) (94% - 100%)      Diagnostics    Free Thyroxine, Serum: AM Sched. Collection: 26-Oct-2021 06:00 (10-25 @ 11:45)  Thyroid Stimulating Hormone, Serum: AM Sched. Collection: 26-Oct-2021 06:00 (10-25 @ 11:45)  Cortisol AM, Serum: 08:00 (10-22 @ 12:47)  Free Thyroxine, Serum: AM Sched. Collection: 23-Oct-2021 06:00 (10-22 @ 12:43)  Thyroid Stimulating Hormone, Serum: AM Sched. Collection: 23-Oct-2021 06:00 (10-22 @ 12:43)

## 2021-11-02 NOTE — PROGRESS NOTE ADULT - ASSESSMENT
ABBY now on HD  Acute on chronic systolic CHF with fluid overload  Bilateral L>>R pleural effusions  S/P RP bleed on AC   Atrial Flutter/Fib    REC    HD per renal  Transfuse RBC prn per MICU team  Monitor Hb  Negative balance as tolerated once bleeding ceased

## 2021-11-02 NOTE — CONSULT NOTE ADULT - ASSESSMENT
72yo M w/ PMHx of CAD (s/p CABG 2019), CKD (unknown stage), DM2, Parkinson's Disease, HTN, depression presents with bilateral leg swelling, new onset acute heart failure exacerbation, NSTEMI, started on hep gtt, now with bl RP bleed, acute anemia. transferred to MICU. Hematology consulted in the setting of prolonged PTT, not corrected on mixing study with abnormal factor XI.     #Prolonged PTT   #Normocytic Anemia   - patient presented with b/l leg swelling treated as acute HF exacerbation, NSTEMI, started on heparin drip developed RP bleed s/p embolization on 10/28   - on admission PTT prolonged 53.2 now off heparin drip remained prolonged in the 50s   - mixing study at 2hr did not correct   - VWF, factor VIII, IX. 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 Unlikely to be the cause of bleeding   - given the above studies, consistent with inhibitor  - please transfuse PRBC and 2 FFP as patient hemoglobin dropped, consider repeat CTA to see if recurring RP bleed   - patient had BM at bedside, no melena seen   - Hb 6.7- transfuse to keep hb >7, may need to be higher in the setting of NSTEMI- would discuss with cardiology   - send lupus anticoagulant as could be the cause for prolonged PTT   - will continue to follow     #MGUS   - patient noted with an M spike of 0.4  - IgG lambda on PHOEBE   - please send free kappa/lambda light chains and quantitative immunoglobulins     Beau Russell MD   Hematology/Oncology Fellow   pager [phone]   After 5pm and on weekends page on call fellow

## 2021-11-02 NOTE — PROGRESS NOTE ADULT - SUBJECTIVE AND OBJECTIVE BOX
Follow-up Pulm Progress Note  Brayan Verma MD  [phone]    No new respiratory events overnight.    Appears comfortble on nasal cannula  Hb downtrendingn: 6.7  POCUS chest noted: mod L effusion      Vital Signs Last 24 Hrs  T(C): 36.2 (02 Nov 2021 08:00), Max: 36.7 (01 Nov 2021 16:30)  T(F): 97.2 (02 Nov 2021 08:00), Max: 98 (01 Nov 2021 16:30)  HR: 82 (02 Nov 2021 11:00) (58 - 117)  BP: 110/56 (02 Nov 2021 11:00) (90/52 - 158/67)  BP(mean): 81 (02 Nov 2021 11:00) (65 - 110)  RR: 20 (02 Nov 2021 11:00) (15 - 36)  SpO2: 100% (02 Nov 2021 11:00) (94% - 100%)                        6.7    14.10 )-----------( 174      ( 02 Nov 2021 01:24 )             20.4     11-02    136  |  98  |  45<H>  ----------------------------<  176<H>  3.8   |  23  |  3.91<H>    Ca    8.7      02 Nov 2021 01:23  Phos  3.8     11-02  Mg     2.2     11-02    TPro  5.8<L>  /  Alb  2.2<L>  /  TBili  1.9<H>  /  DBili  x   /  AST  34  /  ALT  6<L>  /  AlkPhos  103  11-02    Physical Examination:  PULM: Diminished basilar BS  CVS: Regular rate and rhythm, no murmurs, rubs, or gallops  ABD: Soft, non-tender  EXT:  No clubbing, cyanosis, or edema    RADIOLOGY REVIEWED  CXR:    CT chest:    TTE:

## 2021-11-02 NOTE — PROGRESS NOTE ADULT - ATTENDING COMMENTS
1. RP bleed bilaterally. L RP bleed embolized by IR. Pt required addition unit PRBC fro HB . Hemodynamically stable . Will have HEME consult for abnormal mixing studies.  HR  50-60s. on lopressor.  3.ABBY from [facility]N . . Addition of  midodrine 20mg 30 min before HD worked well. Ni IV pressors needed for HD..  4. Delirium: Multifactorial. Fever, hypotension, ICU. Pt now in  room with windows. Continue Precedex. Avoid ativan and versed.  today. Continue Seroquel 25mg at night. Still delirious but more coherent today. Mental status waxes and wanes.  5.Parkinson's disease.  Continue Sinemet.  6. Fevers with increased wbc. WBC stable ~14. Awaiting culture results. Stop vancomycin and zosyn.

## 2021-11-02 NOTE — CONSULT NOTE ADULT - SUBJECTIVE AND OBJECTIVE BOX
HPI:  72yo M w/ PMHx of CAD (s/p CABG 2019), CKD (unknown stage), DM2, Parkinson's Disease, HTN, depression presents with bilateral leg swelling, patient's daughter in-law at bedside Yoly Quintero ([address] [place] [address] Nurse) provides the history, the daughter reports the patient is a poor historian, unable to remember having conversations with others and likely cannot weigh risk/benefits of medical conditions, she reports that he has had bilateral lower extremity swelling for the past few months, but over the past 2 weeks it has significantly worsened, he has further difficulty ambulating due to swelling, his outpatient provider attempted to manage with 20mg lasix and then increased to 40mg lasix but the patient never took the increased dose, his symptoms ar persistent throughout the day and are extremely severe, he has developed wounds of the legs with weeping of fluid due to the swelling, she reports that the patient is frequently non-compliant with medications and medical management, he lives at home with his son and daughter in law, he denies chest pain, shortness of breath, fever/chills, cough, sputum, in the ED, he was tachycardic but hemodynamically stable, afebrile, saturating well on room air, labs were significant for elevated BNP, elevated creatinine, imaging showed moderate left pleural effusion, patient was admitted to general medicine for further management  (21 Oct 2021 07:06)      PAST MEDICAL & SURGICAL HISTORY:  Hypertension    Diabetes Mellitus, Type 2    Hypercholesterolemia    Parkinson disease    CAD (coronary artery disease)  s/p 1 stent in 2010 and CABG 2019    S/P CABG (coronary artery bypass graft)  2019    S/P hip replacement, right  2016    Anxiety    Depression, major        Allergies    adhesives (Rash)  latex (Urticaria)  No Known Drug Allergies    Intolerances        MEDICATIONS  (STANDING):  ascorbic acid 500 milliGRAM(s) Oral daily  atorvastatin 80 milliGRAM(s) Oral at bedtime  carbidopa/levodopa  25/100 2.5 Tablet(s) Oral <User Schedule>  carbidopa/levodopa  25/100 2 Tablet(s) Oral <User Schedule>  chlorhexidine 4% Liquid 1 Application(s) Topical <User Schedule>  cholecalciferol 1000 Unit(s) Oral daily  dextrose 40% Gel 15 Gram(s) Oral once  dextrose 5%. 1000 milliLiter(s) (50 mL/Hr) IV Continuous <Continuous>  dextrose 5%. 1000 milliLiter(s) (100 mL/Hr) IV Continuous <Continuous>  dextrose 50% Injectable 25 Gram(s) IV Push once  dextrose 50% Injectable 12.5 Gram(s) IV Push once  dextrose 50% Injectable 25 Gram(s) IV Push once  folic acid 1 milliGRAM(s) Oral daily  glucagon  Injectable 1 milliGRAM(s) IntraMuscular once  insulin glargine Injectable (LANTUS) 6 Unit(s) SubCutaneous at bedtime  insulin lispro (ADMELOG) corrective regimen sliding scale   SubCutaneous every 6 hours  levothyroxine 25 MICROGram(s) Oral daily  metoprolol tartrate 25 milliGRAM(s) Oral two times a day  midodrine 20 milliGRAM(s) Oral every 8 hours  midodrine. 20 milliGRAM(s) Oral <User Schedule>  Nephro-celeste 1 Tablet(s) Oral daily  pantoprazole  Injectable 40 milliGRAM(s) IV Push daily  polyethylene glycol 3350 17 Gram(s) Oral two times a day  QUEtiapine 25 milliGRAM(s) Oral at bedtime  senna Syrup 10 milliLiter(s) Oral at bedtime  sevelamer carbonate Powder 1600 milliGRAM(s) Oral three times a day with meals  trihexyphenidyl 2 milliGRAM(s) Oral three times a day    MEDICATIONS  (PRN):  acetaminophen     Tablet .. 650 milliGRAM(s) Oral every 6 hours PRN Mild Pain (1 - 3)  albuterol/ipratropium for Nebulization 3 milliLiter(s) Nebulizer every 6 hours PRN Shortness of Breath and/or Wheezing  guaiFENesin Oral Liquid (Sugar-Free) 200 milliGRAM(s) Oral every 6 hours PRN Cough  oxyCODONE    IR 5 milliGRAM(s) Oral every 4 hours PRN Severe Pain (7 - 10)  sodium chloride 0.9% lock flush 10 milliLiter(s) IV Push every 1 hour PRN Pre/post blood products, medications, blood draw, and to maintain line patency      FAMILY HISTORY:  Family history of hypertension    Family history of malaria    Family history of pulmonary fibrosis (Sibling)        SOCIAL HISTORY: No EtOH, no tobacco    REVIEW OF SYSTEMS:    limited secondary to mental status  States he is in pain, unable to locate where         T(F): 97.2 (11-02-21 @ 08:00), Max: 98 (11-01-21 @ 16:30)  HR: 81 (11-02-21 @ 12:00)  BP: 129/61 (11-02-21 @ 12:00)  RR: 30 (11-02-21 @ 12:00)  SpO2: 99% (11-02-21 @ 12:00)  Wt(kg): --    GENERAL: NAD, confused however awake with NG tube in place   HEAD:  Atraumatic, Normocephalic  EYES: EOMI, PERRLA, conjunctiva and sclera clear  NECK: Supple, No JVD  CHEST/LUNG: Clear to auscultation bilaterally; No wheeze  HEART: Regular rate and rhythm; No murmurs, rubs, or gallops  ABDOMEN: Soft, Nontender, Nondistended; Bowel sounds present. stool brown, no melena or bright red blood   EXTREMITIES:  2+ Peripheral Pulses, No clubbing, cyanosis, or edema  NEUROLOGY: limited secondary to mental status   SKIN: some abdominal bruising noted                           6.7    14.10 )-----------( 174      ( 02 Nov 2021 01:24 )             20.4       11-02    136  |  98  |  45<H>  ----------------------------<  176<H>  3.8   |  23  |  3.91<H>    Ca    8.7      02 Nov 2021 01:23  Phos  3.8     11-02  Mg     2.2     11-02    TPro  5.8<L>  /  Alb  2.2<L>  /  TBili  1.9<H>  /  DBili  x   /  AST  34  /  ALT  6<L>  /  AlkPhos  103  11-02      Magnesium, Serum: 2.2 mg/dL (11-02 @ 01:23)  Phosphorus Level, Serum: 3.8 mg/dL (11-02 @ 01:23)      PT/INR - ( 02 Nov 2021 01:24 )   PT: 13.4 sec;   INR: 1.12 ratio         PTT - ( 02 Nov 2021 01:24 )  PTT:54.5 sec    .Blood Blood  10-29 @ 14:55   No growth to date.  --  --      .Blood Blood-Peripheral  10-21 @ 06:11   No Growth Final  --  Blood Culture PCR

## 2021-11-02 NOTE — PROGRESS NOTE ADULT - SUBJECTIVE AND OBJECTIVE BOX
SUBJECTIVE / OVERNIGHT EVENTS:    patient seen and examined  NGT in place  + restraints     --------------------------------------------------------------------------------------------  LABS:                        6.7    14.10 )-----------( 174      ( 02 Nov 2021 01:24 )             20.4     11-02    136  |  98  |  45<H>  ----------------------------<  176<H>  3.8   |  23  |  3.91<H>    Ca    8.7      02 Nov 2021 01:23  Phos  3.8     11-02  Mg     2.2     11-02    TPro  5.8<L>  /  Alb  2.2<L>  /  TBili  1.9<H>  /  DBili  x   /  AST  34  /  ALT  6<L>  /  AlkPhos  103  11-02    PT/INR - ( 02 Nov 2021 01:24 )   PT: 13.4 sec;   INR: 1.12 ratio         PTT - ( 02 Nov 2021 01:24 )  PTT:54.5 sec  CAPILLARY BLOOD GLUCOSE      POCT Blood Glucose.: 218 mg/dL (02 Nov 2021 05:39)  POCT Blood Glucose.: 199 mg/dL (02 Nov 2021 00:06)  POCT Blood Glucose.: 162 mg/dL (01 Nov 2021 18:34)            RADIOLOGY & ADDITIONAL TESTS:    Imaging Personally Reviewed:  [x] YES  [ ] NO    Consultant(s) Notes Reviewed:  [x] YES  [ ] NO    MEDICATIONS  (STANDING):  ascorbic acid 500 milliGRAM(s) Oral daily  atorvastatin 80 milliGRAM(s) Oral at bedtime  carbidopa/levodopa  25/100 2.5 Tablet(s) Oral <User Schedule>  carbidopa/levodopa  25/100 2 Tablet(s) Oral <User Schedule>  chlorhexidine 4% Liquid 1 Application(s) Topical <User Schedule>  cholecalciferol 1000 Unit(s) Oral daily  dextrose 40% Gel 15 Gram(s) Oral once  dextrose 5%. 1000 milliLiter(s) (50 mL/Hr) IV Continuous <Continuous>  dextrose 5%. 1000 milliLiter(s) (100 mL/Hr) IV Continuous <Continuous>  dextrose 50% Injectable 25 Gram(s) IV Push once  dextrose 50% Injectable 12.5 Gram(s) IV Push once  dextrose 50% Injectable 25 Gram(s) IV Push once  folic acid 1 milliGRAM(s) Oral daily  glucagon  Injectable 1 milliGRAM(s) IntraMuscular once  insulin glargine Injectable (LANTUS) 6 Unit(s) SubCutaneous at bedtime  insulin lispro (ADMELOG) corrective regimen sliding scale   SubCutaneous every 6 hours  levothyroxine 25 MICROGram(s) Oral daily  metoprolol tartrate 25 milliGRAM(s) Oral two times a day  midodrine 20 milliGRAM(s) Oral every 8 hours  Nephro-celeste 1 Tablet(s) Oral daily  pantoprazole  Injectable 40 milliGRAM(s) IV Push daily  polyethylene glycol 3350 17 Gram(s) Oral two times a day  QUEtiapine 25 milliGRAM(s) Oral at bedtime  senna Syrup 10 milliLiter(s) Oral at bedtime  sevelamer carbonate Powder 1600 milliGRAM(s) Oral three times a day with meals  trihexyphenidyl 2 milliGRAM(s) Oral three times a day    MEDICATIONS  (PRN):  acetaminophen     Tablet .. 650 milliGRAM(s) Oral every 6 hours PRN Mild Pain (1 - 3)  albuterol/ipratropium for Nebulization 3 milliLiter(s) Nebulizer every 6 hours PRN Shortness of Breath and/or Wheezing  guaiFENesin Oral Liquid (Sugar-Free) 200 milliGRAM(s) Oral every 6 hours PRN Cough  oxyCODONE    IR 5 milliGRAM(s) Oral every 4 hours PRN Severe Pain (7 - 10)  sodium chloride 0.9% lock flush 10 milliLiter(s) IV Push every 1 hour PRN Pre/post blood products, medications, blood draw, and to maintain line patency      Care Discussed with Consultants/Other Providers [x] YES  [ ] NO    Vital Signs Last 24 Hrs  T(C): 36.2 (02 Nov 2021 08:00), Max: 36.7 (01 Nov 2021 12:00)  T(F): 97.2 (02 Nov 2021 08:00), Max: 98.1 (01 Nov 2021 12:00)  HR: 82 (02 Nov 2021 11:00) (58 - 117)  BP: 110/56 (02 Nov 2021 11:00) (90/52 - 158/67)  BP(mean): 81 (02 Nov 2021 11:00) (65 - 110)  RR: 20 (02 Nov 2021 11:00) (15 - 36)  SpO2: 100% (02 Nov 2021 11:00) (94% - 100%)  I&O's Summary    01 Nov 2021 07:01  -  02 Nov 2021 07:00  --------------------------------------------------------  IN: 1670 mL / OUT: 1400 mL / NET: 270 mL    02 Nov 2021 07:01  -  02 Nov 2021 11:54  --------------------------------------------------------  IN: 110 mL / OUT: 0 mL / NET: 110 mL    PHYSICAL EXAM:  GENERAL: NAD, confused  HEAD:  Atraumatic, Normocephalic  CHEST/LUNG: CTABL, No wheeze  HEART: Regular rate and rhythm; no murmur  ABDOMEN: Soft, Nontender, distended; Bowel sounds present, ngt  EXTREMITIES:  +1 le edema, right ankle wounds  skin: right neck shiley  NEURO: confused         SUBJECTIVE / OVERNIGHT EVENTS:    patient seen and examined  NGT in place  + restraints   co left hip pain    --------------------------------------------------------------------------------------------  LABS:                        6.7    14.10 )-----------( 174      ( 02 Nov 2021 01:24 )             20.4     11-02    136  |  98  |  45<H>  ----------------------------<  176<H>  3.8   |  23  |  3.91<H>    Ca    8.7      02 Nov 2021 01:23  Phos  3.8     11-02  Mg     2.2     11-02    TPro  5.8<L>  /  Alb  2.2<L>  /  TBili  1.9<H>  /  DBili  x   /  AST  34  /  ALT  6<L>  /  AlkPhos  103  11-02    PT/INR - ( 02 Nov 2021 01:24 )   PT: 13.4 sec;   INR: 1.12 ratio         PTT - ( 02 Nov 2021 01:24 )  PTT:54.5 sec  CAPILLARY BLOOD GLUCOSE      POCT Blood Glucose.: 218 mg/dL (02 Nov 2021 05:39)  POCT Blood Glucose.: 199 mg/dL (02 Nov 2021 00:06)  POCT Blood Glucose.: 162 mg/dL (01 Nov 2021 18:34)            RADIOLOGY & ADDITIONAL TESTS:    Imaging Personally Reviewed:  [x] YES  [ ] NO    Consultant(s) Notes Reviewed:  [x] YES  [ ] NO    MEDICATIONS  (STANDING):  ascorbic acid 500 milliGRAM(s) Oral daily  atorvastatin 80 milliGRAM(s) Oral at bedtime  carbidopa/levodopa  25/100 2.5 Tablet(s) Oral <User Schedule>  carbidopa/levodopa  25/100 2 Tablet(s) Oral <User Schedule>  chlorhexidine 4% Liquid 1 Application(s) Topical <User Schedule>  cholecalciferol 1000 Unit(s) Oral daily  dextrose 40% Gel 15 Gram(s) Oral once  dextrose 5%. 1000 milliLiter(s) (50 mL/Hr) IV Continuous <Continuous>  dextrose 5%. 1000 milliLiter(s) (100 mL/Hr) IV Continuous <Continuous>  dextrose 50% Injectable 25 Gram(s) IV Push once  dextrose 50% Injectable 12.5 Gram(s) IV Push once  dextrose 50% Injectable 25 Gram(s) IV Push once  folic acid 1 milliGRAM(s) Oral daily  glucagon  Injectable 1 milliGRAM(s) IntraMuscular once  insulin glargine Injectable (LANTUS) 6 Unit(s) SubCutaneous at bedtime  insulin lispro (ADMELOG) corrective regimen sliding scale   SubCutaneous every 6 hours  levothyroxine 25 MICROGram(s) Oral daily  metoprolol tartrate 25 milliGRAM(s) Oral two times a day  midodrine 20 milliGRAM(s) Oral every 8 hours  Nephro-celeste 1 Tablet(s) Oral daily  pantoprazole  Injectable 40 milliGRAM(s) IV Push daily  polyethylene glycol 3350 17 Gram(s) Oral two times a day  QUEtiapine 25 milliGRAM(s) Oral at bedtime  senna Syrup 10 milliLiter(s) Oral at bedtime  sevelamer carbonate Powder 1600 milliGRAM(s) Oral three times a day with meals  trihexyphenidyl 2 milliGRAM(s) Oral three times a day    MEDICATIONS  (PRN):  acetaminophen     Tablet .. 650 milliGRAM(s) Oral every 6 hours PRN Mild Pain (1 - 3)  albuterol/ipratropium for Nebulization 3 milliLiter(s) Nebulizer every 6 hours PRN Shortness of Breath and/or Wheezing  guaiFENesin Oral Liquid (Sugar-Free) 200 milliGRAM(s) Oral every 6 hours PRN Cough  oxyCODONE    IR 5 milliGRAM(s) Oral every 4 hours PRN Severe Pain (7 - 10)  sodium chloride 0.9% lock flush 10 milliLiter(s) IV Push every 1 hour PRN Pre/post blood products, medications, blood draw, and to maintain line patency      Care Discussed with Consultants/Other Providers [x] YES  [ ] NO    Vital Signs Last 24 Hrs  T(C): 36.2 (02 Nov 2021 08:00), Max: 36.7 (01 Nov 2021 12:00)  T(F): 97.2 (02 Nov 2021 08:00), Max: 98.1 (01 Nov 2021 12:00)  HR: 82 (02 Nov 2021 11:00) (58 - 117)  BP: 110/56 (02 Nov 2021 11:00) (90/52 - 158/67)  BP(mean): 81 (02 Nov 2021 11:00) (65 - 110)  RR: 20 (02 Nov 2021 11:00) (15 - 36)  SpO2: 100% (02 Nov 2021 11:00) (94% - 100%)  I&O's Summary    01 Nov 2021 07:01  -  02 Nov 2021 07:00  --------------------------------------------------------  IN: 1670 mL / OUT: 1400 mL / NET: 270 mL    02 Nov 2021 07:01  -  02 Nov 2021 11:54  --------------------------------------------------------  IN: 110 mL / OUT: 0 mL / NET: 110 mL    PHYSICAL EXAM:  GENERAL: NAD, confused, aao x 1-2  HEAD:  Atraumatic, Normocephalic  CHEST/LUNG: CTABL, No wheeze  HEART: Regular rate and rhythm; no murmur  ABDOMEN: Soft, Nontender, distended; Bowel sounds present, ngt  EXTREMITIES:  +2 le edema, right ankle wounds eschar  skin: right neck bridget

## 2021-11-02 NOTE — PROGRESS NOTE ADULT - ATTENDING COMMENTS
70yo M w/ PMHx of CAD (s/p CABG 2019), CKD (unknown stage), DM2, Parkinson's Disease, HTN, depression presents with new onset acute heart failure exacerbation, NSTEMI, started on hep gtt, now with bl RP bleed, acute anemia. transferred to MICU.    # Acute systolic and diastolic heart failure  # NSTEMI  # ABBY on CKD  # Atrial flutter  # acute hypoxic resp failure  # hemorrhagic shock, left RP hematoma, acute blood loss anemia  TTE mod to severe LV SD, hypokinesis anterior wall, not candidate for cath at this point  HD per renal  10/28 sp IR embolization l4 and l5 lumbar artery  consider repeat CTA as h/h not improved  on midodrine, requiring pressors during dialysis  NGT tube feeds  precedex  appreciate ICU care    # PTT prolonged  did not correct on mixing study, heme following    # DM2 (diabetes mellitus, type 2)  per endo  hba1c 6.4    # CAD (coronary artery disease)  c/w atorvastatin  asa on hold    # Parkinsons disease   # delirium  carbidopa/levodopa restarted  c/w trihexyphenidyl  seroquel    PCP Dr. Simons

## 2021-11-02 NOTE — PROGRESS NOTE ADULT - ASSESSMENT
71 M w volume overload, GI consulted for drop in hgb    1. Volume overload per cardio    2. ABBY on CKD per renal    3. Drop in hgb, no overt GI Bleed.      4. RP bleed  s/p Abdominal Angiography and Embolization on 10/29/2021 by Interventional radiology of l4and l5 lumbar artery  -would rpt CT to reassess RP bleed, reconsult IR    5. abdominal distention  - AXR nonobstructive, give miralax BID    6. Elevated bilirubin likely due to hematoma

## 2021-11-02 NOTE — PROGRESS NOTE ADULT - ASSESSMENT
72yo M w/ PMH of CAD (s/p CABG 2019), CKD (unknown stage), DM2, Parkinson's Disease, HTN, depression p/w b/l LE edema found to have ARF and NSTEMI w/ new Aflutter started on heparin gtt and HD. Course c/b RP bleed w/ anemia, admitted to the MICU for closer monitoring while on HD.    # Neuro:  - A&Ox1 from AOx2  - Continue to monitor  - Pt started on Seroquel  - Stopped precedex     // Parkinson's Dz  - Continue sinemet    # CV:  // CAD/ NSTEMI  - TTE mod to severe LVS dysfxn, hypokinesis anterior wall  - Holding heparin gtt and ASA given increasing RP bleed  - c/w Atorvastatin  - Unstable for cath at this moment  //Hypotension:  - Likely 2/2 hemorrhagic vs cardiogenic shock.  - Currently on midodrine 20 mg, MAP goal > 65  - Give midodrine 10 mg 30 mins prior to dialysis    // Aflutter  - c/w Lopressor 25mg BID  - Monitor for hypotension given RP bleed    # Pulm:  - On 4L NC  - Wean as tolerated  - B/L L >> R pleural effusions    # GI:  - No active issues  - Diet: Renal - via NGT  - Fluid Restrict to 1.5L    # Renal:  // ABBY on CKD  - s/p shiley placement w/ IR (placed 10/28)  - c/w Phoslo 1337 tid  - HD planned for today - removal 1.5L     # Endo  // DM2  - A1c 6.4%  - c/w Lantus 6U qHS  - c/w ISS    // Hypothyroid  - c/w Synthroid 25mcg QD    # ID:  - continue vanc and zosyn pending blood cultures  - BC NGTD - will d/c abx if final cx negative today.     # Heme/Onc:  - Holding AC i/s/o RP bleed  - Hgb stabilizing, will consider CTA if there is significant drop in Hgb or acute change in hemodynamic status.     # Skin:  // No active issues    # Ethics   - FULL CODE  72yo M w/ PMH of CAD (s/p CABG 2019), CKD (unknown stage), DM2, Parkinson's Disease, HTN, depression p/w b/l LE edema found to have ARF and NSTEMI w/ new Aflutter started on heparin gtt and HD. Course c/b RP bleed w/ anemia, admitted to the MICU for closer monitoring while on HD.    # Neuro:  //Delirium  - A&Ox1-AOx2  - Continue to monitor  - Continue seroquel  - Precedex stopped 11/1    // Parkinson's Dz  - Continue sinemet    # CV:  // CAD/ NSTEMI  - TTE mod to severe LVS dysfxn, hypokinesis anterior wall  - Holding heparin gtt and ASA given increasing RP bleed  - c/w Atorvastatin  - Unstable for cath at this moment  //Hypotension:  - Likely 2/2 hemorrhagic vs cardiogenic shock.  - Currently on midodrine 20 mg, MAP goal > 65  - Give midodrine 20 mg 30 mins prior to dialysis    // Aflutter  - c/w Lopressor 25mg BID  - Monitor for hypotension given RP bleed    # Pulm:  - On 2L NC  - Wean as tolerated  - B/L L >> R pleural effusions - however they are not affecting patient's respiratory status  - SpO2 > 92%    # GI:  - No active issues  - Diet: Renal - via NGT  - Fluid Restrict to 1.5L    # Renal:  // ABBY on CKD  - s/p shiley placement w/ IR (placed 10/28)  - c/w Phoslo 1337 tid  - HD per nephro    # Endo  // DM2  - A1c 6.4%  - c/w Lantus 6U qHS  - c/w ISS    // Hypothyroid  - c/w Synthroid 25mcg QD    # ID:  //Leukocytosis  - Stopped abx today  - Was on vanc or zosyn for leukocytosis, however cultures have been negative.   - BC NGTD - will f/u on final cx    # Heme/Onc:  //Anemia likely 2/2 RP bleed  - S/p embolization by IR   - Holding AC i/s/o RP bleed  - Needed 1u pRBCs 11/1 for hgb 6.7.     //Elevated PTT  - Persistently abnormal PTT despite heparin being held.   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  - Heme consulted   - 11/1 transfusing FFP     # Skin:  // No active issues    # Ethics   - FULL CODE

## 2021-11-02 NOTE — PROGRESS NOTE ADULT - SUBJECTIVE AND OBJECTIVE BOX
Chief Complaint:  Patient is a 71y old  Male who presents with a chief complaint of leg swelling (01 Nov 2021 10:50)      Date of service 11-01-21 @ 11:44      Interval Events:     Hospital Medications:  acetaminophen     Tablet .. 650 milliGRAM(s) Oral every 6 hours PRN  albuterol/ipratropium for Nebulization 3 milliLiter(s) Nebulizer every 6 hours PRN  ascorbic acid 500 milliGRAM(s) Oral daily  atorvastatin 80 milliGRAM(s) Oral at bedtime  carbidopa/levodopa  25/100 2.5 Tablet(s) Oral <User Schedule>  carbidopa/levodopa  25/100 2 Tablet(s) Oral <User Schedule>  chlorhexidine 2% Cloths 1 Application(s) Topical daily  chlorhexidine 4% Liquid 1 Application(s) Topical <User Schedule>  cholecalciferol 1000 Unit(s) Oral daily  dextrose 40% Gel 15 Gram(s) Oral once  dextrose 5%. 1000 milliLiter(s) IV Continuous <Continuous>  dextrose 5%. 1000 milliLiter(s) IV Continuous <Continuous>  dextrose 50% Injectable 25 Gram(s) IV Push once  dextrose 50% Injectable 12.5 Gram(s) IV Push once  dextrose 50% Injectable 25 Gram(s) IV Push once  epoetin mari-epbx (RETACRIT) Injectable 68482 Unit(s) IV Push once  folic acid 1 milliGRAM(s) Oral daily  glucagon  Injectable 1 milliGRAM(s) IntraMuscular once  guaiFENesin Oral Liquid (Sugar-Free) 200 milliGRAM(s) Oral every 6 hours PRN  insulin glargine Injectable (LANTUS) 6 Unit(s) SubCutaneous at bedtime  insulin lispro (ADMELOG) corrective regimen sliding scale   SubCutaneous every 6 hours  levothyroxine 25 MICROGram(s) Oral daily  metoprolol tartrate 25 milliGRAM(s) Oral two times a day  midodrine 20 milliGRAM(s) Oral every 8 hours  midodrine. 10 milliGRAM(s) Oral <User Schedule>  Nephro-celeste 1 Tablet(s) Oral daily  oxyCODONE    IR 5 milliGRAM(s) Oral every 4 hours PRN  pantoprazole  Injectable 40 milliGRAM(s) IV Push daily  piperacillin/tazobactam IVPB.. 3.375 Gram(s) IV Intermittent every 12 hours  polyethylene glycol 3350 17 Gram(s) Oral two times a day  QUEtiapine 25 milliGRAM(s) Oral at bedtime  senna 2 Tablet(s) Oral at bedtime  sevelamer carbonate Powder 1600 milliGRAM(s) Oral three times a day with meals  sodium chloride 0.9% lock flush 10 milliLiter(s) IV Push every 1 hour PRN  trihexyphenidyl 2 milliGRAM(s) Oral three times a day        Review of Systems:  General:  No wt loss, fevers, chills, night sweats, fatigue,   Eyes:  Good vision, no reported pain  ENT:  No sore throat, pain, runny nose, dysphagia  CV:  No pain, palpitations, hypo/hypertension  Resp:  No dyspnea, cough, tachypnea, wheezing  GI:  See HPI  :  No pain, bleeding, incontinence, nocturia  Muscle:  No pain, weakness  Neuro:  No weakness, tingling, memory problems  Psych:  No fatigue, insomnia, mood problems, depression  Endocrine:  No polyuria, polydipsia, cold/heat intolerance  Heme:  No petechiae, ecchymosis, easy bruisability  Integumentary:  No rash, edema    PHYSICAL EXAM:   Vital Signs:  Vital Signs Last 24 Hrs  T(C): 36.6 (01 Nov 2021 08:00), Max: 36.6 (01 Nov 2021 08:00)  T(F): 97.8 (01 Nov 2021 08:00), Max: 97.8 (01 Nov 2021 08:00)  HR: 115 (01 Nov 2021 11:00) (53 - 115)  BP: 110/55 (01 Nov 2021 11:00) (74/49 - 131/57)  BP(mean): 75 (01 Nov 2021 11:00) (57 - 88)  RR: 19 (01 Nov 2021 11:00) (12 - 35)  SpO2: 96% (01 Nov 2021 11:00) (94% - 100%)  Daily     Daily       PHYSICAL EXAM:     GENERAL:  Appears stated age, well-groomed, well-nourished, no distress  HEENT:  NC/AT,  conjunctivae anicteric, clear and pink,   NECK: supple, trachea midline  CHEST:  Full & symmetric excursion, no increased effort, breath sounds clear  HEART:  Regular rhythm, no JVD  ABDOMEN:  Soft, non-tender, non-distended, normoactive bowel sounds,  no masses , no hepatosplenomegaly  EXTREMITIES:  no cyanosis,clubbing or edema  SKIN:  No rash, erythema, or, ecchymoses, no jaundice  NEURO:  Alert, non-focal, no asterixis  PSYCH: Appropriate affect, oriented to place and time  RECTAL: Deferred      LABS Personally reviewed by me:                        7.2    17.41 )-----------( 173      ( 01 Nov 2021 09:09 )             23.1     Mean Cell Volume: 99.6 fl (11-01-21 @ 09:09)    11-01    131<L>  |  93<L>  |  72<H>  ----------------------------<  154<H>  5.0   |  20<L>  |  5.31<H>    Ca    8.8      01 Nov 2021 00:14  Phos  6.2     11-01  Mg     2.4     11-01    TPro  6.2  /  Alb  2.3<L>  /  TBili  1.5<H>  /  DBili  x   /  AST  42<H>  /  ALT  6<L>  /  AlkPhos  72  11-01    LIVER FUNCTIONS - ( 01 Nov 2021 00:14 )  Alb: 2.3 g/dL / Pro: 6.2 g/dL / ALK PHOS: 72 U/L / ALT: 6 U/L / AST: 42 U/L / GGT: x           PT/INR - ( 01 Nov 2021 00:15 )   PT: 12.8 sec;   INR: 1.07 ratio         PTT - ( 01 Nov 2021 00:15 )  PTT:50.9 sec                            7.2    17.41 )-----------( 173      ( 01 Nov 2021 09:09 )             23.1                         7.2    17.83 )-----------( 149      ( 01 Nov 2021 00:14 )             21.8                         7.4    17.13 )-----------( 151      ( 31 Oct 2021 15:43 )             22.9                         7.3    17.48 )-----------( 140      ( 31 Oct 2021 11:59 )             22.4                         7.8    16.66 )-----------( 155      ( 31 Oct 2021 01:00 )             24.2       Imaging personally reviewed by me:

## 2021-11-02 NOTE — PROGRESS NOTE ADULT - ASSESSMENT
Assessment  DMT2: 71y Male with DM T2 with hyperglycemia, A1C 6.4%, was on insulin at home, now on low-dose basal insulin and coverage, blood sugars are trending up/running slightly high and not at target today, no hypoglycemias, appears comfortable in NAD, remains NPO on tube feeds.  Hypothyroidism: Patient has no history thyroid disease, was not on any thyroid supplements, subclinical with low-normal FT4, lethargic, now on synthroid 25 mcg po daily.  CHF: on medications, stable, monitored.  HTN: Controlled,  on antihypertensive medications.  Parkinsons: on meds, monitored.  CKD: Monitor labs/BMP      Kelsie Reece MD  Cell: [phone]  Office: [phone]     Assessment  DMT2: 71y Male with DM T2 with hyperglycemia, A1C 6.4%, was on insulin at home, now on low-dose basal insulin and coverage, blood sugars are trending up/running slightly high and not at target today, no hypoglycemias, appears comfortable in NAD,  remains NPO on tube feeds.  Hypothyroidism: Patient has no history thyroid disease, was not on any thyroid supplements, subclinical with low-normal FT4, lethargic, now on synthroid 25 mcg po daily.  CHF: on medications, stable, monitored.  HTN: Controlled,  on antihypertensive medications.  Parkinsons: on meds, monitored.  CKD: Monitor labs/BMP      Kelsie Reece MD  Cell: [phone]  Office: [phone]

## 2021-11-02 NOTE — PROGRESS NOTE ADULT - PROBLEM SELECTOR PLAN 1
Suggest to increase Lantus to 10u at bedtime and continue coverage scale q6. Will continue monitoring FS, log, and FU.  Patient previously on larger dose insulin (Tresiba and Novolog), will continue monitoring and FU DC recommendations.

## 2021-11-02 NOTE — PROGRESS NOTE ADULT - SUBJECTIVE AND OBJECTIVE BOX
[facility] [phone]        Patient seen and examined he had HD yesterday 1.4L removed.         MEDICATIONS  (STANDING):  ascorbic acid 500 milliGRAM(s) Oral daily  atorvastatin 80 milliGRAM(s) Oral at bedtime  carbidopa/levodopa  25/100 2.5 Tablet(s) Oral <User Schedule>  carbidopa/levodopa  25/100 2 Tablet(s) Oral <User Schedule>  chlorhexidine 4% Liquid 1 Application(s) Topical <User Schedule>  cholecalciferol 1000 Unit(s) Oral daily  dextrose 40% Gel 15 Gram(s) Oral once  dextrose 5%. 1000 milliLiter(s) (50 mL/Hr) IV Continuous <Continuous>  dextrose 5%. 1000 milliLiter(s) (100 mL/Hr) IV Continuous <Continuous>  dextrose 50% Injectable 25 Gram(s) IV Push once  dextrose 50% Injectable 12.5 Gram(s) IV Push once  dextrose 50% Injectable 25 Gram(s) IV Push once  folic acid 1 milliGRAM(s) Oral daily  glucagon  Injectable 1 milliGRAM(s) IntraMuscular once  insulin glargine Injectable (LANTUS) 6 Unit(s) SubCutaneous at bedtime  insulin lispro (ADMELOG) corrective regimen sliding scale   SubCutaneous every 6 hours  levothyroxine 25 MICROGram(s) Oral daily  metoprolol tartrate 25 milliGRAM(s) Oral two times a day  midodrine 20 milliGRAM(s) Oral every 8 hours  midodrine. 10 milliGRAM(s) Oral <User Schedule>  Nephro-celeste 1 Tablet(s) Oral daily  pantoprazole  Injectable 40 milliGRAM(s) IV Push daily  polyethylene glycol 3350 17 Gram(s) Oral two times a day  QUEtiapine 25 milliGRAM(s) Oral at bedtime  senna Syrup 10 milliLiter(s) Oral at bedtime  sevelamer carbonate Powder 1600 milliGRAM(s) Oral three times a day with meals  trihexyphenidyl 2 milliGRAM(s) Oral three times a day      VITAL:  T(C): , Max: 36.7 (11-01-21 @ 12:00)  T(F): , Max: 98.1 (11-01-21 @ 12:00)  HR: 85 (11-02-21 @ 10:00)  BP: 103/54 (11-02-21 @ 10:00)  BP(mean): 73 (11-02-21 @ 10:00)  RR: 17 (11-02-21 @ 10:00)  SpO2: 100% (11-02-21 @ 10:00)  Wt(kg): --    I and O's:    11-01 @ 07:01  -  11-02 @ 07:00  --------------------------------------------------------  IN: 1670 mL / OUT: 1400 mL / NET: 270 mL    11-02 @ 07:01  -  11-02 @ 11:09  --------------------------------------------------------  IN: 110 mL / OUT: 0 mL / NET: 110 mL          PHYSICAL EXAM:    Constitutional: NAD, confused   HEENT: NGT  Neck:  No JVD  Respiratory: CTAB/L  Cardiovascular: S1 and S2  Gastrointestinal: BS+, soft, NT/ND  Extremities:++ peripheral edema  : No Javier  Skin: No rashes  Access: [facility]    LABS:                        6.7    14.10 )-----------( 174      ( 02 Nov 2021 01:24 )             20.4     11-02    136  |  98  |  45<H>  ----------------------------<  176<H>  3.8   |  23  |  3.91<H>    Ca    8.7      02 Nov 2021 01:23  Phos  3.8     11-02  Mg     2.2     11-02    TPro  5.8<L>  /  Alb  2.2<L>  /  TBili  1.9<H>  /  DBili  x   /  AST  34  /  ALT  6<L>  /  AlkPhos  103  11-02          Urine Studies:          RADIOLOGY & ADDITIONAL STUDIES:

## 2021-11-02 NOTE — PROGRESS NOTE ADULT - ASSESSMENT
ASSESSMENT/PLAN  72yo M w/ PMHx of CAD (s/p CABG 2019), CKD (unknown stage), DM2, Parkinson's Disease, HTN, depression presents with bilateral leg swelling  Mild CHF  Acute on chronic renal failure --getting worse   Hyperphosphatemia  Severe LV dysfucntion     1 IR-  perm cath early next week       2 Renal-   Next HD tomorrow and aim for 1.5 kg fluid removal     3 Hyperphosphatemia -  phoslo  1334 mg TID, improving      4 CVS- soft at present    5 Anemia - hgb lower today PRBC today and Retacrit at HD    [facility]  [employer]  [phone]

## 2021-11-02 NOTE — PROGRESS NOTE ADULT - ASSESSMENT
72yo M w/ PMHx of CAD (s/p CABG 2019), CKD (unknown stage), DM2, Parkinson's Disease, HTN, depression presents with new onset acute heart failure exacerbation, NSTEMI, started on hep gtt, now with bl RP bleed, acute anemia. transferred to MICU.    # Acute systolic and diastolic heart failure  # NSTEMI  # ABBY on CKD  # Atrial flutter  # acute hypoxic resp failure  # hemorrhagic shock, left RP hematoma, acute blood loss anemia  # delirium  TTE mod to severe LV SD, hypokinesis anterior wall  sp shiley placement for HD, HD per renal  not candidate for cath at this point  hep gtt held 2/2 anemia  10/27 active RP bleeding, transferred to MICU, sp multiple transfusions DDAVP  10/28 IR for abd angiography and embolization l4 and l5 lumbar artery  transfuse PRN  on midodrine  vanco zosyn started for leukocytosis, CXR left pleural effusion vs pna  NGT  s/p precedex  appreciate ICU care    # DM2 (diabetes mellitus, type 2)  per endo  hba1c 6.4    # CAD (coronary artery disease)  c/w atorvastatin  asa on hold    # Parkinsons disease   # delirium  carbidopa/levodopa restarted  c/w trihexyphenidyl    PCP Dr. Simons

## 2021-11-02 NOTE — PROGRESS NOTE ADULT - SUBJECTIVE AND OBJECTIVE BOX
INTERVAL HPI/OVERNIGHT EVENTS:    SUBJECTIVE: Patient seen and examined at bedside.       VITAL SIGNS:  ICU Vital Signs Last 24 Hrs  T(C): 36.1 (02 Nov 2021 06:00), Max: 36.7 (01 Nov 2021 12:00)  T(F): 97 (02 Nov 2021 06:00), Max: 98.1 (01 Nov 2021 12:00)  HR: 58 (02 Nov 2021 07:00) (58 - 117)  BP: 90/52 (02 Nov 2021 07:00) (90/52 - 158/67)  BP(mean): 65 (02 Nov 2021 07:00) (65 - 110)  ABP: --  ABP(mean): --  RR: 31 (02 Nov 2021 07:00) (15 - 36)  SpO2: 100% (02 Nov 2021 07:00) (94% - 100%)      Plateau pressure:   P/F ratio:     11-01 @ 07:01  -  11-02 @ 07:00  --------------------------------------------------------  IN: 1670 mL / OUT: 1400 mL / NET: 270 mL      CAPILLARY BLOOD GLUCOSE      POCT Blood Glucose.: 218 mg/dL (02 Nov 2021 05:39)    ECG:    PHYSICAL EXAM:    General:   HEENT:   Neck:   Respiratory:   Cardiovascular:   Abdomen:   Extremities:  Neurological:    MEDICATIONS:  MEDICATIONS  (STANDING):  ascorbic acid 500 milliGRAM(s) Oral daily  atorvastatin 80 milliGRAM(s) Oral at bedtime  carbidopa/levodopa  25/100 2.5 Tablet(s) Oral <User Schedule>  carbidopa/levodopa  25/100 2 Tablet(s) Oral <User Schedule>  chlorhexidine 4% Liquid 1 Application(s) Topical <User Schedule>  cholecalciferol 1000 Unit(s) Oral daily  dextrose 40% Gel 15 Gram(s) Oral once  dextrose 5%. 1000 milliLiter(s) (50 mL/Hr) IV Continuous <Continuous>  dextrose 5%. 1000 milliLiter(s) (100 mL/Hr) IV Continuous <Continuous>  dextrose 50% Injectable 25 Gram(s) IV Push once  dextrose 50% Injectable 12.5 Gram(s) IV Push once  dextrose 50% Injectable 25 Gram(s) IV Push once  folic acid 1 milliGRAM(s) Oral daily  glucagon  Injectable 1 milliGRAM(s) IntraMuscular once  insulin glargine Injectable (LANTUS) 6 Unit(s) SubCutaneous at bedtime  insulin lispro (ADMELOG) corrective regimen sliding scale   SubCutaneous every 6 hours  levothyroxine 25 MICROGram(s) Oral daily  metoprolol tartrate 25 milliGRAM(s) Oral two times a day  midodrine 20 milliGRAM(s) Oral every 8 hours  midodrine. 10 milliGRAM(s) Oral <User Schedule>  Nephro-celeste 1 Tablet(s) Oral daily  pantoprazole  Injectable 40 milliGRAM(s) IV Push daily  piperacillin/tazobactam IVPB.. 3.375 Gram(s) IV Intermittent every 12 hours  polyethylene glycol 3350 17 Gram(s) Oral two times a day  QUEtiapine 25 milliGRAM(s) Oral at bedtime  senna Syrup 10 milliLiter(s) Oral at bedtime  sevelamer carbonate Powder 1600 milliGRAM(s) Oral three times a day with meals  trihexyphenidyl 2 milliGRAM(s) Oral three times a day    MEDICATIONS  (PRN):  acetaminophen     Tablet .. 650 milliGRAM(s) Oral every 6 hours PRN Mild Pain (1 - 3)  albuterol/ipratropium for Nebulization 3 milliLiter(s) Nebulizer every 6 hours PRN Shortness of Breath and/or Wheezing  guaiFENesin Oral Liquid (Sugar-Free) 200 milliGRAM(s) Oral every 6 hours PRN Cough  oxyCODONE    IR 5 milliGRAM(s) Oral every 4 hours PRN Severe Pain (7 - 10)  sodium chloride 0.9% lock flush 10 milliLiter(s) IV Push every 1 hour PRN Pre/post blood products, medications, blood draw, and to maintain line patency      ALLERGIES:  Allergies    adhesives (Rash)  latex (Urticaria)  No Known Drug Allergies    Intolerances        LABS:                        6.7    14.10 )-----------( 174      ( 02 Nov 2021 01:24 )             20.4     11-02    136  |  98  |  45<H>  ----------------------------<  176<H>  3.8   |  23  |  3.91<H>    Ca    8.7      02 Nov 2021 01:23  Phos  3.8     11-02  Mg     2.2     11-02    TPro  5.8<L>  /  Alb  2.2<L>  /  TBili  1.9<H>  /  DBili  x   /  AST  34  /  ALT  6<L>  /  AlkPhos  103  11-02    PT/INR - ( 02 Nov 2021 01:24 )   PT: 13.4 sec;   INR: 1.12 ratio         PTT - ( 02 Nov 2021 01:24 )  PTT:54.5 sec      RADIOLOGY & ADDITIONAL TESTS: Reviewed.   INTERVAL HPI/OVERNIGHT EVENTS:    SUBJECTIVE: Patient seen and examined at bedside.       VITAL SIGNS:  ICU Vital Signs Last 24 Hrs  T(C): 36.1 (02 Nov 2021 06:00), Max: 36.7 (01 Nov 2021 12:00)  T(F): 97 (02 Nov 2021 06:00), Max: 98.1 (01 Nov 2021 12:00)  HR: 58 (02 Nov 2021 07:00) (58 - 117)  BP: 90/52 (02 Nov 2021 07:00) (90/52 - 158/67)  BP(mean): 65 (02 Nov 2021 07:00) (65 - 110)  ABP: --  ABP(mean): --  RR: 31 (02 Nov 2021 07:00) (15 - 36)  SpO2: 100% (02 Nov 2021 07:00) (94% - 100%)      Plateau pressure:   P/F ratio:     11-01 @ 07:01  -  11-02 @ 07:00  --------------------------------------------------------  IN: 1670 mL / OUT: 1400 mL / NET: 270 mL      CAPILLARY BLOOD GLUCOSE      POCT Blood Glucose.: 218 mg/dL (02 Nov 2021 05:39)    ECG:    PHYSICAL EXAM:    GENERAL: NAD, AAOx2, NGT in place  HEAD:  Atraumatic, Normocephalic  CHEST/LUNG: Wheezing present on middle lung fields b/l  HEART: RRR  ABDOMEN: Soft, Nontender, Nondistended; Bowel sounds present  EXTREMITIES:  2+ Peripheral Pulses, No clubbing, cyanosis, or edema  NEURO: No focal deficits    MEDICATIONS:  MEDICATIONS  (STANDING):  ascorbic acid 500 milliGRAM(s) Oral daily  atorvastatin 80 milliGRAM(s) Oral at bedtime  carbidopa/levodopa  25/100 2.5 Tablet(s) Oral <User Schedule>  carbidopa/levodopa  25/100 2 Tablet(s) Oral <User Schedule>  chlorhexidine 4% Liquid 1 Application(s) Topical <User Schedule>  cholecalciferol 1000 Unit(s) Oral daily  dextrose 40% Gel 15 Gram(s) Oral once  dextrose 5%. 1000 milliLiter(s) (50 mL/Hr) IV Continuous <Continuous>  dextrose 5%. 1000 milliLiter(s) (100 mL/Hr) IV Continuous <Continuous>  dextrose 50% Injectable 25 Gram(s) IV Push once  dextrose 50% Injectable 12.5 Gram(s) IV Push once  dextrose 50% Injectable 25 Gram(s) IV Push once  folic acid 1 milliGRAM(s) Oral daily  glucagon  Injectable 1 milliGRAM(s) IntraMuscular once  insulin glargine Injectable (LANTUS) 6 Unit(s) SubCutaneous at bedtime  insulin lispro (ADMELOG) corrective regimen sliding scale   SubCutaneous every 6 hours  levothyroxine 25 MICROGram(s) Oral daily  metoprolol tartrate 25 milliGRAM(s) Oral two times a day  midodrine 20 milliGRAM(s) Oral every 8 hours  midodrine. 10 milliGRAM(s) Oral <User Schedule>  Nephro-celeste 1 Tablet(s) Oral daily  pantoprazole  Injectable 40 milliGRAM(s) IV Push daily  piperacillin/tazobactam IVPB.. 3.375 Gram(s) IV Intermittent every 12 hours  polyethylene glycol 3350 17 Gram(s) Oral two times a day  QUEtiapine 25 milliGRAM(s) Oral at bedtime  senna Syrup 10 milliLiter(s) Oral at bedtime  sevelamer carbonate Powder 1600 milliGRAM(s) Oral three times a day with meals  trihexyphenidyl 2 milliGRAM(s) Oral three times a day    MEDICATIONS  (PRN):  acetaminophen     Tablet .. 650 milliGRAM(s) Oral every 6 hours PRN Mild Pain (1 - 3)  albuterol/ipratropium for Nebulization 3 milliLiter(s) Nebulizer every 6 hours PRN Shortness of Breath and/or Wheezing  guaiFENesin Oral Liquid (Sugar-Free) 200 milliGRAM(s) Oral every 6 hours PRN Cough  oxyCODONE    IR 5 milliGRAM(s) Oral every 4 hours PRN Severe Pain (7 - 10)  sodium chloride 0.9% lock flush 10 milliLiter(s) IV Push every 1 hour PRN Pre/post blood products, medications, blood draw, and to maintain line patency      ALLERGIES:  Allergies    adhesives (Rash)  latex (Urticaria)  No Known Drug Allergies    Intolerances        LABS:                        6.7    14.10 )-----------( 174      ( 02 Nov 2021 01:24 )             20.4     11-02    136  |  98  |  45<H>  ----------------------------<  176<H>  3.8   |  23  |  3.91<H>    Ca    8.7      02 Nov 2021 01:23  Phos  3.8     11-02  Mg     2.2     11-02    TPro  5.8<L>  /  Alb  2.2<L>  /  TBili  1.9<H>  /  DBili  x   /  AST  34  /  ALT  6<L>  /  AlkPhos  103  11-02    PT/INR - ( 02 Nov 2021 01:24 )   PT: 13.4 sec;   INR: 1.12 ratio         PTT - ( 02 Nov 2021 01:24 )  PTT:54.5 sec      RADIOLOGY & ADDITIONAL TESTS: Reviewed.   INTERVAL HPI/OVERNIGHT EVENTS:    SUBJECTIVE: Patient seen and examined at bedside. ON patient pulled NGT, needed to be replaced. Hgb was found to be 6.7, 1uPRBC was transfused. This morning, the pt was A&Ox1 to person.       VITAL SIGNS:  ICU Vital Signs Last 24 Hrs  T(C): 36.1 (02 Nov 2021 06:00), Max: 36.7 (01 Nov 2021 12:00)  T(F): 97 (02 Nov 2021 06:00), Max: 98.1 (01 Nov 2021 12:00)  HR: 58 (02 Nov 2021 07:00) (58 - 117)  BP: 90/52 (02 Nov 2021 07:00) (90/52 - 158/67)  BP(mean): 65 (02 Nov 2021 07:00) (65 - 110)  ABP: --  ABP(mean): --  RR: 31 (02 Nov 2021 07:00) (15 - 36)  SpO2: 100% (02 Nov 2021 07:00) (94% - 100%)      Plateau pressure:   P/F ratio:     11-01 @ 07:01  -  11-02 @ 07:00  --------------------------------------------------------  IN: 1670 mL / OUT: 1400 mL / NET: 270 mL      CAPILLARY BLOOD GLUCOSE      POCT Blood Glucose.: 218 mg/dL (02 Nov 2021 05:39)    ECG:    PHYSICAL EXAM:    GENERAL: NAD, AAOx1, NGT in place  HEAD:  Atraumatic, Normocephalic  CHEST/LUNG: Wheezing present on middle lung fields b/l  HEART: RRR  ABDOMEN: Soft, Nontender, Nondistended; Bowel sounds present  EXTREMITIES:  2+ Peripheral Pulses, No clubbing, cyanosis, or edema  NEURO: No focal deficits    MEDICATIONS:  MEDICATIONS  (STANDING):  ascorbic acid 500 milliGRAM(s) Oral daily  atorvastatin 80 milliGRAM(s) Oral at bedtime  carbidopa/levodopa  25/100 2.5 Tablet(s) Oral <User Schedule>  carbidopa/levodopa  25/100 2 Tablet(s) Oral <User Schedule>  chlorhexidine 4% Liquid 1 Application(s) Topical <User Schedule>  cholecalciferol 1000 Unit(s) Oral daily  dextrose 40% Gel 15 Gram(s) Oral once  dextrose 5%. 1000 milliLiter(s) (50 mL/Hr) IV Continuous <Continuous>  dextrose 5%. 1000 milliLiter(s) (100 mL/Hr) IV Continuous <Continuous>  dextrose 50% Injectable 25 Gram(s) IV Push once  dextrose 50% Injectable 12.5 Gram(s) IV Push once  dextrose 50% Injectable 25 Gram(s) IV Push once  folic acid 1 milliGRAM(s) Oral daily  glucagon  Injectable 1 milliGRAM(s) IntraMuscular once  insulin glargine Injectable (LANTUS) 6 Unit(s) SubCutaneous at bedtime  insulin lispro (ADMELOG) corrective regimen sliding scale   SubCutaneous every 6 hours  levothyroxine 25 MICROGram(s) Oral daily  metoprolol tartrate 25 milliGRAM(s) Oral two times a day  midodrine 20 milliGRAM(s) Oral every 8 hours  midodrine. 10 milliGRAM(s) Oral <User Schedule>  Nephro-celeste 1 Tablet(s) Oral daily  pantoprazole  Injectable 40 milliGRAM(s) IV Push daily  piperacillin/tazobactam IVPB.. 3.375 Gram(s) IV Intermittent every 12 hours  polyethylene glycol 3350 17 Gram(s) Oral two times a day  QUEtiapine 25 milliGRAM(s) Oral at bedtime  senna Syrup 10 milliLiter(s) Oral at bedtime  sevelamer carbonate Powder 1600 milliGRAM(s) Oral three times a day with meals  trihexyphenidyl 2 milliGRAM(s) Oral three times a day    MEDICATIONS  (PRN):  acetaminophen     Tablet .. 650 milliGRAM(s) Oral every 6 hours PRN Mild Pain (1 - 3)  albuterol/ipratropium for Nebulization 3 milliLiter(s) Nebulizer every 6 hours PRN Shortness of Breath and/or Wheezing  guaiFENesin Oral Liquid (Sugar-Free) 200 milliGRAM(s) Oral every 6 hours PRN Cough  oxyCODONE    IR 5 milliGRAM(s) Oral every 4 hours PRN Severe Pain (7 - 10)  sodium chloride 0.9% lock flush 10 milliLiter(s) IV Push every 1 hour PRN Pre/post blood products, medications, blood draw, and to maintain line patency      ALLERGIES:  Allergies    adhesives (Rash)  latex (Urticaria)  No Known Drug Allergies    Intolerances        LABS:                        6.7    14.10 )-----------( 174      ( 02 Nov 2021 01:24 )             20.4     11-02    136  |  98  |  45<H>  ----------------------------<  176<H>  3.8   |  23  |  3.91<H>    Ca    8.7      02 Nov 2021 01:23  Phos  3.8     11-02  Mg     2.2     11-02    TPro  5.8<L>  /  Alb  2.2<L>  /  TBili  1.9<H>  /  DBili  x   /  AST  34  /  ALT  6<L>  /  AlkPhos  103  11-02    PT/INR - ( 02 Nov 2021 01:24 )   PT: 13.4 sec;   INR: 1.12 ratio         PTT - ( 02 Nov 2021 01:24 )  PTT:54.5 sec      RADIOLOGY & ADDITIONAL TESTS: Reviewed.

## 2021-11-03 NOTE — CONSULT NOTE ADULT - PROBLEM SELECTOR RECOMMENDATION 2
Suggest to continue medications, monitoring, FU primary team recommendations.
-  -noted on ecg and telemetry  -rate controlled  -continue with lopressor  -heparin drip for now  -continue with telemetry monitoring.
On Metoprolol.

## 2021-11-03 NOTE — PROGRESS NOTE ADULT - ATTENDING COMMENTS
1. RP bleed bilaterally. L RP bleed embolized by IR.Pt received additional PRBC today without appropriate response. . Hemodynamically stable . HR  50-60s. on lopressor. PTT did not correct with FFP. Will send for CTA again.  3.ABBY from [facility] . . Addition of  midodrine 20mg 30 min before HD worked well. Ni IV pressors needed for HD..  4. Delirium: Multifactorial. Fever, hypotension, ICU. Pt now in  room with windows. Continue Precedex. Avoid ativan and versed.  today. Continue Seroquel 25mg at night. Still delirious but more coherent today. Attempt swallow evaluation today.  5.Parkinson's disease.  Continue Sinemet.  6. Fevers with increased wbc. WBC stable ~14. No further fevers. . Stop vancomycin and zosyn. 1. RP bleed bilaterally. L RP bleed embolized by IR.Hb stable last 24 hrs.. Hemodynamically stable . HR  50-60s. on lopressor. PTT did not correct with FFP.   3.ABBY from [facility]N . . Addition of  midodrine 20mg 30 min before HD worked well. Ni IV pressors needed for HD..  4. Delirium: Multifactorial. Fever, hypotension, ICU. Pt now in  room with windows. Continue Precedex. Avoid ativan and versed.  today. Continue Seroquel 25mg at night. Still delirious but more coherent today. Attempt swallow evaluation today.  5.Parkinson's disease.  Continue Sinemet.  6. Fevers with increased wbc. WBC stable ~14. No further fevers. . Stop vancomycin and zosyn.

## 2021-11-03 NOTE — PROGRESS NOTE ADULT - ASSESSMENT
ASSESSMENT/PLAN  70yo M w/ PMHx of CAD (s/p CABG 2019), CKD (unknown stage), DM2, Parkinson's Disease, HTN, depression presents with bilateral leg swelling  Mild CHF  Acute on chronic renal failure --getting worse   Severe LV dysfucntion   Confusion     1 IR-  perm cath early next week  when out of MICU     2 Renal-   Next HD today and aim for 1.5 kg fluid removal     3 CVS- BP is elevated this am (hold parameters on Midodrine when SBP> 160)    5 Anemia - Serial CBC and may need more blood     Sayed nothingGrinder, [place]  [phone]

## 2021-11-03 NOTE — PROGRESS NOTE ADULT - ASSESSMENT
Assessment  DMT2: 71y Male with DM T2 with hyperglycemia, A1C 6.4%, was on insulin at home, now on low-dose basal insulin and coverage, blood sugars are fluctuating/running high overnight, no hypoglycemic episodes, NPO on tube feeds, remains pleasantly confused.  Hypothyroidism: Patient has no history thyroid disease, was not on any thyroid supplements, subclinical with low-normal FT4, lethargic, now on synthroid 25 mcg po daily.  CHF: on medications, stable, monitored.  HTN: Controlled,  on antihypertensive medications.  Parkinsons: on meds, monitored.  CKD: Monitor labs/BMP      Kelsie Reece MD  Cell: [phone]  Office: [phone]     Assessment  DMT2: 71y Male with DM T2 with hyperglycemia, A1C 6.4%, was on insulin at home, now on low-dose basal insulin and coverage, blood sugars are fluctuating/running high overnight, no hypoglycemic episodes,  NPO on tube feeds, remains pleasantly confused.  Hypothyroidism: Patient has no history thyroid disease, was not on any thyroid supplements, subclinical with low-normal FT4, lethargic, now on synthroid 25 mcg po daily.  CHF: on medications, stable, monitored.  HTN: Controlled,  on antihypertensive medications.  Parkinsons: on meds, monitored.  CKD: Monitor labs/BMP      Kelsie Reece MD  Cell: [phone]  Office: [phone]

## 2021-11-03 NOTE — PROGRESS NOTE ADULT - SUBJECTIVE AND OBJECTIVE BOX
Chief complaint  Patient is a 71y old  Male who presents with a chief complaint of leg swelling (03 Nov 2021 14:19)   Review of systems  No acute events, no hypoglycemic episodes.    Labs and Fingersticks  CAPILLARY BLOOD GLUCOSE      POCT Blood Glucose.: 139 mg/dL (03 Nov 2021 13:08)  POCT Blood Glucose.: 189 mg/dL (03 Nov 2021 06:10)  POCT Blood Glucose.: 199 mg/dL (02 Nov 2021 22:36)  POCT Blood Glucose.: 229 mg/dL (02 Nov 2021 17:24)    Medications  MEDICATIONS  (STANDING):  ascorbic acid 500 milliGRAM(s) Oral daily  atorvastatin 80 milliGRAM(s) Oral at bedtime  carbidopa/levodopa  25/100 2.5 Tablet(s) Oral <User Schedule>  carbidopa/levodopa  25/100 2 Tablet(s) Oral <User Schedule>  chlorhexidine 4% Liquid 1 Application(s) Topical <User Schedule>  cholecalciferol 1000 Unit(s) Oral daily  dextrose 40% Gel 15 Gram(s) Oral once  dextrose 5%. 1000 milliLiter(s) (50 mL/Hr) IV Continuous <Continuous>  dextrose 5%. 1000 milliLiter(s) (100 mL/Hr) IV Continuous <Continuous>  dextrose 50% Injectable 25 Gram(s) IV Push once  dextrose 50% Injectable 12.5 Gram(s) IV Push once  dextrose 50% Injectable 25 Gram(s) IV Push once  epoetin mari-epbx (RETACRIT) Injectable 87675 Unit(s) IV Push once  folic acid 1 milliGRAM(s) Oral daily  glucagon  Injectable 1 milliGRAM(s) IntraMuscular once  insulin glargine Injectable (LANTUS) 6 Unit(s) SubCutaneous at bedtime  insulin lispro (ADMELOG) corrective regimen sliding scale   SubCutaneous every 6 hours  levothyroxine 25 MICROGram(s) Oral daily  metoprolol tartrate 25 milliGRAM(s) Oral two times a day  midodrine 20 milliGRAM(s) Oral every 8 hours  midodrine. 20 milliGRAM(s) Oral <User Schedule>  Nephro-celeste 1 Tablet(s) Oral daily  pantoprazole  Injectable 40 milliGRAM(s) IV Push daily  polyethylene glycol 3350 17 Gram(s) Oral two times a day  QUEtiapine 25 milliGRAM(s) Oral at bedtime  senna Syrup 10 milliLiter(s) Oral at bedtime  sevelamer carbonate Powder 1600 milliGRAM(s) Oral three times a day with meals  trihexyphenidyl 2 milliGRAM(s) Oral three times a day      Physical Exam  General: Patient comfortable in bed  Vital Signs Last 12 Hrs  T(F): 98 (11-03-21 @ 12:00), Max: 99.1 (11-03-21 @ 08:00)  HR: 88 (11-03-21 @ 14:00) (80 - 110)  BP: 133/76 (11-03-21 @ 14:00) (126/76 - 169/80)  BP(mean): 98 (11-03-21 @ 14:00) (86 - 115)  RR: 27 (11-03-21 @ 13:00) (9 - 59)  SpO2: 100% (11-03-21 @ 14:00) (85% - 100%)    Diagnostics    Free Thyroxine, Serum: AM Sched. Collection: 26-Oct-2021 06:00 (10-25 @ 11:45)  Thyroid Stimulating Hormone, Serum: AM Sched. Collection: 26-Oct-2021 06:00 (10-25 @ 11:45)  Cortisol AM, Serum: 08:00 (10-22 @ 12:47)  Free Thyroxine, Serum: AM Sched. Collection: 23-Oct-2021 06:00 (10-22 @ 12:43)  Thyroid Stimulating Hormone, Serum: AM Sched. Collection: 23-Oct-2021 06:00 (10-22 @ 12:43)           Chief complaint  Patient is a 71y old  Male who presents with a chief complaint of leg swelling (03 Nov 2021 14:19)   Review of systems  No acute events, no hypoglycemic episodes.    Labs and Fingersticks  CAPILLARY BLOOD GLUCOSE      POCT Blood Glucose.: 139 mg/dL (03 Nov 2021 13:08)  POCT Blood Glucose.: 189 mg/dL (03 Nov 2021 06:10)  POCT Blood Glucose.: 199 mg/dL (02 Nov 2021 22:36)  POCT Blood Glucose.: 229 mg/dL (02 Nov 2021 17:24)    Medications  MEDICATIONS  (STANDING):  ascorbic acid 500 milliGRAM(s) Oral daily  atorvastatin 80 milliGRAM(s) Oral at bedtime  carbidopa/levodopa  25/100 2.5 Tablet(s) Oral <User Schedule>  carbidopa/levodopa  25/100 2 Tablet(s) Oral <User Schedule>  chlorhexidine 4% Liquid 1 Application(s) Topical <User Schedule>  cholecalciferol 1000 Unit(s) Oral daily  dextrose 40% Gel 15 Gram(s) Oral once  dextrose 5%. 1000 milliLiter(s) (50 mL/Hr) IV Continuous <Continuous>  dextrose 5%. 1000 milliLiter(s) (100 mL/Hr) IV Continuous <Continuous>  dextrose 50% Injectable 25 Gram(s) IV Push once  dextrose 50% Injectable 12.5 Gram(s) IV Push once  dextrose 50% Injectable 25 Gram(s) IV Push once  epoetin mari-epbx (RETACRIT) Injectable 30597 Unit(s) IV Push once  folic acid 1 milliGRAM(s) Oral daily  glucagon  Injectable 1 milliGRAM(s) IntraMuscular once  insulin glargine Injectable (LANTUS) 6 Unit(s) SubCutaneous at bedtime  insulin lispro (ADMELOG) corrective regimen sliding scale   SubCutaneous every 6 hours  levothyroxine 25 MICROGram(s) Oral daily  metoprolol tartrate 25 milliGRAM(s) Oral two times a day  midodrine 20 milliGRAM(s) Oral every 8 hours  midodrine. 20 milliGRAM(s) Oral <User Schedule>  Nephro-celeste 1 Tablet(s) Oral daily  pantoprazole  Injectable 40 milliGRAM(s) IV Push daily  polyethylene glycol 3350 17 Gram(s) Oral two times a day  QUEtiapine 25 milliGRAM(s) Oral at bedtime  senna Syrup 10 milliLiter(s) Oral at bedtime  sevelamer carbonate Powder 1600 milliGRAM(s) Oral three times a day with meals  trihexyphenidyl 2 milliGRAM(s) Oral three times a day      Physical Exam  General: Patient comfortable in bed  Vital Signs Last 12 Hrs  T(F): 98 (11-03-21 @ 12:00), Max: 99.1 (11-03-21 @ 08:00)  HR: 88 (11-03-21 @ 14:00) (80 - 110)  BP: 133/76 (11-03-21 @ 14:00) (126/76 - 169/80)  BP(mean): 98 (11-03-21 @ 14:00) (86 - 115)  RR: 27 (11-03-21 @ 13:00) (9 - 59)  SpO2: 100% (11-03-21 @ 14:00) (85% - 100%)    Diagnostics    Free Thyroxine, Serum: AM Sched. Collection: 26-Oct-2021 06:00 (10-25 @ 11:45)  Thyroid Stimulating Hormone, Serum: AM Sched. Collection: 26-Oct-2021 06:00 (10-25 @ 11:45)  Cortisol AM, Serum: 08:00 (10-22 @ 12:47)  Free Thyroxine, Serum: AM Sched. Collection: 23-Oct-2021 06:00 (10-22 @ 12:43)  Thyroid Stimulating Hormone, Serum: AM Sched. Collection: 23-Oct-2021 06:00 (10-22 @ 12:43)

## 2021-11-03 NOTE — PROGRESS NOTE ADULT - SUBJECTIVE AND OBJECTIVE BOX
Follow-up Pulm Progress Note  Brayan Verma MD  [phone]    No new respiratory events overnight.    Appears comfortble on nasal cannula: awake and interactive, confused  Hb 7.7  POCUS chest noted: mod L effusion      Vital Signs Last 24 Hrs  T(C): 36.7 (03 Nov 2021 12:00), Max: 37.3 (03 Nov 2021 08:00)  T(F): 98 (03 Nov 2021 12:00), Max: 99.1 (03 Nov 2021 08:00)  HR: 110 (03 Nov 2021 13:00) (65 - 110)  BP: 150/80 (03 Nov 2021 13:00) (125/60 - 169/80)  BP(mean): 107 (03 Nov 2021 13:00) (83 - 115)  RR: 27 (03 Nov 2021 13:00) (9 - 59)  SpO2: 85% (03 Nov 2021 13:00) (85% - 100%)                        7.7    14.95 )-----------( 197      ( 03 Nov 2021 13:12 )             24.2     11-03    137  |  97  |  63<H>  ----------------------------<  205<H>  4.1   |  24  |  5.23<H>    Ca    9.1      03 Nov 2021 01:48  Phos  4.4     11-03  Mg     2.4     11-03    TPro  6.7  /  Alb  2.6<L>  /  TBili  1.5<H>  /  DBili  x   /  AST  35  /  ALT  6<L>  /  AlkPhos  136<H>  11-03    Physical Examination:  PULM: Diminished basilar BS  CVS: Regular rate and rhythm, no murmurs, rubs, or gallops  ABD: Soft, non-tender  EXT:  No clubbing, cyanosis, or edema    RADIOLOGY REVIEWED  CXR:    CT chest:    TTE:

## 2021-11-03 NOTE — PROGRESS NOTE ADULT - SUBJECTIVE AND OBJECTIVE BOX
NEPHROLOGY-[facility] [phone]        Patient seen and examined in bed.  He was confused         MEDICATIONS  (STANDING):  ascorbic acid 500 milliGRAM(s) Oral daily  atorvastatin 80 milliGRAM(s) Oral at bedtime  carbidopa/levodopa  25/100 2.5 Tablet(s) Oral <User Schedule>  carbidopa/levodopa  25/100 2 Tablet(s) Oral <User Schedule>  chlorhexidine 4% Liquid 1 Application(s) Topical <User Schedule>  cholecalciferol 1000 Unit(s) Oral daily  dextrose 40% Gel 15 Gram(s) Oral once  dextrose 5%. 1000 milliLiter(s) (50 mL/Hr) IV Continuous <Continuous>  dextrose 5%. 1000 milliLiter(s) (100 mL/Hr) IV Continuous <Continuous>  dextrose 50% Injectable 25 Gram(s) IV Push once  dextrose 50% Injectable 12.5 Gram(s) IV Push once  dextrose 50% Injectable 25 Gram(s) IV Push once  epoetin mari-epbx (RETACRIT) Injectable 57220 Unit(s) IV Push once  folic acid 1 milliGRAM(s) Oral daily  glucagon  Injectable 1 milliGRAM(s) IntraMuscular once  insulin glargine Injectable (LANTUS) 6 Unit(s) SubCutaneous at bedtime  insulin lispro (ADMELOG) corrective regimen sliding scale   SubCutaneous every 6 hours  levothyroxine 25 MICROGram(s) Oral daily  metoprolol tartrate 25 milliGRAM(s) Oral two times a day  midodrine 20 milliGRAM(s) Oral every 8 hours  midodrine. 20 milliGRAM(s) Oral <User Schedule>  Nephro-celeste 1 Tablet(s) Oral daily  pantoprazole  Injectable 40 milliGRAM(s) IV Push daily  polyethylene glycol 3350 17 Gram(s) Oral two times a day  QUEtiapine 25 milliGRAM(s) Oral at bedtime  senna Syrup 10 milliLiter(s) Oral at bedtime  sevelamer carbonate Powder 1600 milliGRAM(s) Oral three times a day with meals  trihexyphenidyl 2 milliGRAM(s) Oral three times a day      VITAL:  T(C): , Max: 37.3 (11-03-21 @ 08:00)  T(F): , Max: 99.1 (11-03-21 @ 08:00)  HR: 80 (11-03-21 @ 09:00)  BP: 169/80 (11-03-21 @ 09:00)  BP(mean): 115 (11-03-21 @ 09:00)  RR: 14 (11-03-21 @ 09:00)  SpO2: 100% (11-03-21 @ 09:00)  Wt(kg): --    I and O's:    11-02 @ 07:01  -  11-03 @ 07:00  --------------------------------------------------------  IN: 2430 mL / OUT: 0 mL / NET: 2430 mL    11-03 @ 07:01  -  11-03 @ 09:44  --------------------------------------------------------  IN: 0 mL / OUT: 25 mL / NET: -25 mL          PHYSICAL EXAM:    Constitutional: NAD  Neck:  No JVD  Respiratory: CTAB/L  Cardiovascular: S1 and S2  Gastrointestinal: BS+, soft, NT/ND  Extremities: No peripheral edema  Neurological: A/O x 3, no focal deficits  Psychiatric: Normal mood, normal affect  : No Javier  Skin: No rashes  Access: [facility]     LABS:                        7.1    13.02 )-----------( 185      ( 03 Nov 2021 00:26 )             21.3     11-03    137  |  97  |  63<H>  ----------------------------<  205<H>  4.1   |  24  |  5.23<H>    Ca    9.1      03 Nov 2021 01:48  Phos  4.4     11-03  Mg     2.4     11-03    TPro  6.7  /  Alb  2.6<L>  /  TBili  1.5<H>  /  DBili  x   /  AST  35  /  ALT  6<L>  /  AlkPhos  136<H>  11-03          Urine Studies:          RADIOLOGY & ADDITIONAL STUDIES:

## 2021-11-03 NOTE — CONSULT NOTE ADULT - PROBLEM SELECTOR RECOMMENDATION 4
-  -baseline cr ~3  -nephrology following  -hold nephrotoxic agents.
Continue meds, monitoring.
Will continue to f/u for GOC, ACP, and resources.        Dennis Juarez MD   Geriatrics and Palliative Care (GAP) Consult Service    of Geriatric and Palliative Medicine  [facility]      Please page the following number for clinical matters between the hours of 9 am and 5 pm from Monday through Friday : [phone]    After 5pm and on weekends, please see the contact information below:    In the event of newly developing, evolving, or worsening symptoms, please contact the Palliative Medicine team via pager (if the patient is at [facility] #[phone] or if the patient is at [facility] #[phone]) The Geriatric and Palliative Medicine service has coverage 24 hours a day/ 7 days a week to provide medical recommendations regarding symptom management needs via telephone

## 2021-11-03 NOTE — CONSULT NOTE ADULT - CONVERSATION DETAILS
The patient's son indicated to be the patient's HCP and believed the HCP form was to be available into our medical records; however, if not able to be found (which after reviewing I was not able to find) he indicated he was able to bring it in.     Yunier is an EMS and his wife is a nurse at [employer], he provided a detailed summary about the patient's hospitalization and treatments being provided. He expressed major concerns regarding the patient's mental status and cognitive impairment particularly when the patient has not had any memory issues and was able to care by himself until right before this admission. I explained to Yunier that there is a good chance the patient is having Delirium or encephalopathy due to his current medical  issues. Furthermore, that the hospital setting is also a factor contributing to his confusional state. If in fact Delirium is the issues, I indicated that after treating the  patient's acute issues (which is being done by the primary team) and by allowing time to pass by is how we will be able to define how much the patient's cognition may be able to improve. Meanwhile I encourage Yunier and his family to visit the patient frequently, tying to provide frequent reassurance and verbal orientation and neither endorsing nor challenging delusions or hallucinations.    Understanding the patient's prior functional and cognitive state, current goals appear to be towards improving his acute issues so he can go back to a levels closer to were he was before this admission.

## 2021-11-03 NOTE — PROGRESS NOTE ADULT - ASSESSMENT
70yo M w/ PMHx of CAD (s/p CABG 2019), CKD (unknown stage), DM2, Parkinson's Disease, HTN, depression presents with new onset acute heart failure exacerbation, NSTEMI, started on hep gtt, now with bl RP bleed, acute anemia. transferred to MICU.    # Acute systolic and diastolic heart failure  # NSTEMI  # ABBY on CKD  # Atrial flutter  # acute hypoxic resp failure  # hemorrhagic shock, left RP hematoma, acute blood loss anemia  # delirium  TTE mod to severe LV SD, hypokinesis anterior wall  sp shiley placement for HD, HD per renal  not candidate for cath at this point  hep gtt held 2/2 anemia  10/27 active RP bleeding, transferred to MICU, sp multiple transfusions DDAVP  10/28 IR for abd angiography and embolization l4 and l5 lumbar artery  transfuse PRN  on midodrine  vanco zosyn started for leukocytosis, CXR left pleural effusion vs pna  NGT  s/p precedex  appreciate ICU care    # DM2 (diabetes mellitus, type 2)  per endo  hba1c 6.4    # CAD (coronary artery disease)  c/w atorvastatin  asa on hold    # Parkinsons disease   # delirium  carbidopa/levodopa restarted  c/w trihexyphenidyl    PCP Dr. Simons 70yo M w/ PMHx of CAD (s/p CABG 2019), CKD (unknown stage), DM2, Parkinson's Disease, HTN, depression presents with new onset acute heart failure exacerbation, NSTEMI, started on hep gtt, now with bl RP bleed, acute anemia. transferred to MICU.    # Acute systolic and diastolic heart failure  # NSTEMI  # ABBY on CKD- newly started HD  # Atrial flutter  # acute hypoxic resp failure  # hemorrhagic shock, left RP hematoma, acute blood loss anemia  TTE mod to severe LV SD, hypokinesis anterior wall, not candidate for cath at this point  HD per renal  10/28 sp IR embolization l4 and l5 lumbar artery  consider repeat CTA as h/h not improved  on midodrine, requiring pressors during dialysis  NGT tube feeds  precedex  appreciate ICU care    # PTT prolonged  did not correct on mixing study, heme following    # DM2 (diabetes mellitus, type 2)  per endo  hba1c 6.4    # CAD (coronary artery disease)  c/w atorvastatin  asa on hold    # Parkinsons disease   # delirium  carbidopa/levodopa restarted  c/w trihexyphenidyl  seroquel    PCP Dr. Simons

## 2021-11-03 NOTE — PROGRESS NOTE ADULT - ASSESSMENT
ABBY now on HD  Acute on chronic systolic CHF with fluid overload  Bilateral L>>R pleural effusions  S/P RP bleed on AC   Atrial Flutter/Fib    REC    Transfuse RBC prn per MICU team  Monitor Hb  HD today per renal

## 2021-11-03 NOTE — CONSULT NOTE ADULT - ASSESSMENT
72yo M w/  CAD (s/p CABG 2019), CKD (unknown stage), DM2, Parkinson's Disease, HTN, depression p/w b/l LE edema found to have ARF and NSTEMI w/ new Aflutter started on heparin gtt and HD. Course c/b RP bleed w/ anemia, admitted to the MICU for closer monitoring while on HD. Also with fluctuating mental status and confusion.  72yo M w/  CAD (s/p CABG 2019), CKD (unknown stage), DM2, Parkinson's Disease, HTN, depression p/w b/l LE edema found to have ARF and NSTEMI w/ new Aflutter started on heparin gtt and HD. Course c/b RP bleed w/ anemia, admitted to the MICU for closer monitoring while on HD. Also with fluctuating mental status and confusion.     Full note to f/u.     see GOC above.  70yo M w/  CAD (s/p CABG 2019), CKD (unknown stage), DM2, Parkinson's Disease, HTN, depression p/w b/l LE edema found to have ARF and NSTEMI w/ new Aflutter started on heparin gtt and HD. Course c/b RP bleed w/ anemia, admitted to the MICU for closer monitoring while on HD. Also with fluctuating mental status and confusion.

## 2021-11-03 NOTE — PROGRESS NOTE ADULT - ATTENDING COMMENTS
72yo M w/ PMHx of CAD (s/p CABG 2019), CKD (unknown stage), DM2, Parkinson's Disease, HTN, depression presents with new onset acute heart failure exacerbation, NSTEMI, started on hep gtt, now with bl RP bleed, acute anemia. transferred to MICU.    # Acute systolic and diastolic heart failure  # NSTEMI  # ABBY on CKD  # Atrial flutter  # acute hypoxic resp failure  # hemorrhagic shock, left RP hematoma, acute blood loss anemia  TTE mod to severe LV SD, hypokinesis anterior wall, not candidate for cath at this point  HD per renal  10/28 sp IR embolization l4 and l5 lumbar artery  consider repeat CTA as h/h not improved  on midodrine, requiring pressors during dialysis  NGT tube feeds  precedex  appreciate ICU care    # PTT prolonged  did not correct on mixing study, heme following    # DM2 (diabetes mellitus, type 2)  per endo  hba1c 6.4    # CAD (coronary artery disease)  c/w atorvastatin  asa on hold    # Parkinsons disease   # delirium  carbidopa/levodopa restarted  c/w trihexyphenidyl  seroquel    PCP Dr. Simons

## 2021-11-03 NOTE — PROGRESS NOTE ADULT - SUBJECTIVE AND OBJECTIVE BOX
Patient is a 71y old  Male who presents with a chief complaint of leg swelling (03 Nov 2021 14:07)      INTERVAL HPI/OVERNIGHT EVENTS: noted  pt seen and examined this am   events noted  c/o lt groin pain       Vital Signs Last 24 Hrs  T(C): 36.7 (03 Nov 2021 12:00), Max: 37.3 (03 Nov 2021 08:00)  T(F): 98 (03 Nov 2021 12:00), Max: 99.1 (03 Nov 2021 08:00)  HR: 88 (03 Nov 2021 14:00) (65 - 110)  BP: 133/76 (03 Nov 2021 14:00) (125/60 - 169/80)  BP(mean): 98 (03 Nov 2021 14:00) (83 - 115)  RR: 27 (03 Nov 2021 13:00) (9 - 59)  SpO2: 100% (03 Nov 2021 14:00) (85% - 100%)    acetaminophen     Tablet .. 650 milliGRAM(s) Oral every 6 hours PRN  albuterol/ipratropium for Nebulization 3 milliLiter(s) Nebulizer every 6 hours PRN  ascorbic acid 500 milliGRAM(s) Oral daily  atorvastatin 80 milliGRAM(s) Oral at bedtime  carbidopa/levodopa  25/100 2.5 Tablet(s) Oral <User Schedule>  carbidopa/levodopa  25/100 2 Tablet(s) Oral <User Schedule>  chlorhexidine 4% Liquid 1 Application(s) Topical <User Schedule>  cholecalciferol 1000 Unit(s) Oral daily  dextrose 40% Gel 15 Gram(s) Oral once  dextrose 5%. 1000 milliLiter(s) IV Continuous <Continuous>  dextrose 5%. 1000 milliLiter(s) IV Continuous <Continuous>  dextrose 50% Injectable 25 Gram(s) IV Push once  dextrose 50% Injectable 12.5 Gram(s) IV Push once  dextrose 50% Injectable 25 Gram(s) IV Push once  epoetin mari-epbx (RETACRIT) Injectable 60009 Unit(s) IV Push once  folic acid 1 milliGRAM(s) Oral daily  glucagon  Injectable 1 milliGRAM(s) IntraMuscular once  guaiFENesin Oral Liquid (Sugar-Free) 200 milliGRAM(s) Oral every 6 hours PRN  insulin glargine Injectable (LANTUS) 6 Unit(s) SubCutaneous at bedtime  insulin lispro (ADMELOG) corrective regimen sliding scale   SubCutaneous every 6 hours  levothyroxine 25 MICROGram(s) Oral daily  metoprolol tartrate 25 milliGRAM(s) Oral two times a day  midodrine 20 milliGRAM(s) Oral every 8 hours  midodrine. 20 milliGRAM(s) Oral <User Schedule>  Nephro-celeste 1 Tablet(s) Oral daily  oxyCODONE    IR 5 milliGRAM(s) Oral every 4 hours PRN  pantoprazole  Injectable 40 milliGRAM(s) IV Push daily  polyethylene glycol 3350 17 Gram(s) Oral two times a day  QUEtiapine 25 milliGRAM(s) Oral at bedtime  senna Syrup 10 milliLiter(s) Oral at bedtime  sevelamer carbonate Powder 1600 milliGRAM(s) Oral three times a day with meals  sodium chloride 0.9% lock flush 10 milliLiter(s) IV Push every 1 hour PRN  trihexyphenidyl 2 milliGRAM(s) Oral three times a day      PHYSICAL EXAM:  GENERAL: NAD,   EYES: conjunctiva and sclera clear  ENMT: Moist mucous membranes  NECK: Supple, No JVD, Normal thyroid  CHEST/LUNG: non labored, cta b/l  HEART: Regular rate and rhythm; No murmurs, rubs, or gallops  ABDOMEN: Soft, Nontender, Nondistended; Bowel sounds present  EXTREMITIES:  2+ Peripheral Pulses, No clubbing, cyanosis, or edema  LYMPH: No lymphadenopathy noted  SKIN: No rashes or lesions    Consultant(s) Notes Reviewed:  [x ] YES  [ ] NO  Care Discussed with Consultants/Other Providers [ x] YES  [ ] NO    LABS:                        7.7    14.95 )-----------( 197      ( 03 Nov 2021 13:12 )             24.2     11-03    137  |  97  |  63<H>  ----------------------------<  205<H>  4.1   |  24  |  5.23<H>    Ca    9.1      03 Nov 2021 01:48  Phos  4.4     11-03  Mg     2.4     11-03    TPro  6.7  /  Alb  2.6<L>  /  TBili  1.5<H>  /  DBili  x   /  AST  35  /  ALT  6<L>  /  AlkPhos  136<H>  11-03    PT/INR - ( 03 Nov 2021 00:26 )   PT: 13.4 sec;   INR: 1.12 ratio         PTT - ( 03 Nov 2021 00:26 )  PTT:54.8 sec    CAPILLARY BLOOD GLUCOSE      POCT Blood Glucose.: 139 mg/dL (03 Nov 2021 13:08)  POCT Blood Glucose.: 189 mg/dL (03 Nov 2021 06:10)  POCT Blood Glucose.: 199 mg/dL (02 Nov 2021 22:36)  POCT Blood Glucose.: 229 mg/dL (02 Nov 2021 17:24)              RADIOLOGY & ADDITIONAL TESTS:    Imaging Personally Reviewed:  [x ] YES  [ ] NO

## 2021-11-03 NOTE — PROGRESS NOTE ADULT - ASSESSMENT
70yo M w/ PMH of CAD (s/p CABG 2019), CKD (unknown stage), DM2, Parkinson's Disease, HTN, depression p/w b/l LE edema found to have ARF and NSTEMI w/ new Aflutter started on heparin gtt and HD. Course c/b RP bleed w/ anemia, admitted to the MICU for closer monitoring while on HD.    # Neuro:  //Delirium  - A&Ox1-AOx2  - Continue to monitor  - Continue seroquel  - Precedex stopped 11/1    // Parkinson's Dz  - Continue sinemet    # CV:  // CAD/ NSTEMI  - TTE mod to severe LVS dysfxn, hypokinesis anterior wall  - Holding heparin gtt and ASA given increasing RP bleed  - c/w Atorvastatin  - Unstable for cath at this moment  //Hypotension:  - Likely 2/2 hemorrhagic vs cardiogenic shock.  - Currently on midodrine 20 mg, MAP goal > 65  - Give midodrine 20 mg 30 mins prior to dialysis    // Aflutter  - c/w Lopressor 25mg BID  - Monitor for hypotension given RP bleed    # Pulm:  - On 2L NC  - Wean as tolerated  - B/L L >> R pleural effusions - however they are not affecting patient's respiratory status  - SpO2 > 92%    # GI:  - No active issues  - Diet: Renal - via NGT  - Fluid Restrict to 1.5L    # Renal:  // ABBY on CKD  - s/p shiley placement w/ IR (placed 10/28)  - c/w Phoslo 1337 tid  - HD per nephro    # Endo  // DM2  - A1c 6.4%  - c/w Lantus 6U qHS  - c/w ISS    // Hypothyroid  - c/w Synthroid 25mcg QD    # ID:  //Leukocytosis  - Stopped abx today  - Was on vanc or zosyn for leukocytosis, however cultures have been negative.   - BC NGTD - will f/u on final cx    # Heme/Onc:  //Anemia likely 2/2 RP bleed  - S/p embolization by IR   - Holding AC i/s/o RP bleed  - Needed 1u pRBCs 11/1 for hgb 6.7.     //Elevated PTT  - Persistently abnormal PTT despite heparin being held.   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  - Heme consulted   - 11/1 transfusing FFP     # Skin:  // No active issues    # Ethics   - FULL CODE  72yo M w/ PMH of CAD (s/p CABG 2019), CKD (unknown stage), DM2, Parkinson's Disease, HTN, depression p/w b/l LE edema found to have ARF and NSTEMI w/ new Aflutter started on heparin gtt and HD. Course c/b RP bleed w/ anemia, admitted to the MICU for closer monitoring while on HD.    # Neuro:  //Delirium  - A&Ox1-AOx2  - Continue to monitor  - Continue seroquel  - Precedex stopped 11/1    // Parkinson's Dz  - Continue sinemet    # CV:  // CAD/ NSTEMI  - TTE mod to severe LVS dysfxn, hypokinesis anterior wall  - Holding heparin gtt and ASA given increasing RP bleed  - c/w Atorvastatin  - Unstable for cath at this moment  //Hypotension:  - Likely 2/2 hemorrhagic vs cardiogenic shock.  - Currently on midodrine 20 mg, MAP goal > 65  - Give midodrine 20 mg 30 mins prior to dialysis    // Aflutter  - c/w Lopressor 25mg BID  - Monitor for hypotension given RP bleed    # Pulm:  - On 2L NC  - Wean as tolerated  - B/L L >> R pleural effusions - however they are not affecting patient's respiratory status  - SpO2 > 92%  - Will not be pursuing thoracentesis at this time    # GI:  - No active issues  - Diet: Renal - via NGT  - Fluid Restrict to 1.5L    # Renal:  // ABBY on CKD  - s/p shiley placement w/ IR (placed 10/28)  - c/w Phoslo 1337 tid  - HD per nephro    # Endo  // DM2  - A1c 6.4%  - c/w Lantus 6U qHS  - c/w ISS    // Hypothyroid  - c/w Synthroid 25mcg QD    # ID:  //Leukocytosis  - Stopped abx today  - Was on vanc or zosyn for leukocytosis, however cultures have been negative.   - BC NGTD - will f/u on final cx    # Heme/Onc:  //Anemia likely 2/2 RP bleed  - S/p embolization by IR   - Holding AC i/s/o RP bleed  - Needed 1u pRBCs 11/2 for hgb 6.7, since elevated to 7.2. Currently stabilized to 7.1.   - Will continue to monitor patient's Hgb for stabilization  - Transfuse if hgb < 7.0     //Elevated PTT  - Persistently abnormal PTT despite heparin being held.   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  - Heme following  - 11/2 - 2u FFP, however PTT is still elevated today.  - Will f/u w/ heme regarding persistent PTT elevation despite FFP infusion.     # Skin:  // No active issues    # Ethics   - FULL CODE     Edwin Wills, PGY-1   Internal Medicine  Pager: [phone] / [facility]: [phone]  70yo M w/ PMH of CAD (s/p CABG 2019), CKD (unknown stage), DM2, Parkinson's Disease, HTN, depression p/w b/l LE edema found to have ARF and NSTEMI w/ new Aflutter started on heparin gtt and HD. Course c/b RP bleed w/ anemia, admitted to the MICU for closer monitoring while on HD.    # Neuro:  //Delirium  - A&Ox1-AOx2  - Continue to monitor  - Continue seroquel  - Precedex stopped 11/1    // Parkinson's Dz  - Continue sinemet    # CV:  // CAD/ NSTEMI  - TTE mod to severe LVS dysfxn, hypokinesis anterior wall  - Holding heparin gtt and ASA given increasing RP bleed  - c/w Atorvastatin  - Unstable for cath at this moment  //Hypotension:  - Likely 2/2 hemorrhagic vs cardiogenic shock.  - Currently on midodrine 20 mg, MAP goal > 65  - Give midodrine 20 mg 30 mins prior to dialysis    // Aflutter  - c/w Lopressor 25mg BID  - Monitor for hypotension given RP bleed    # Pulm:  - On 2L NC  - Wean as tolerated  - B/L L >> R pleural effusions - however they are not affecting patient's respiratory status  - SpO2 > 92%  - Will not be pursuing thoracentesis at this time    # GI:  - No active issues  - Diet: Renal - via NGT  - Fluid Restrict to 1.5L    # Renal:  // ABBY on CKD  - s/p shiley placement w/ IR (placed 10/28)  - c/w Phoslo 1337 tid  - HD per nephro    # Endo  // DM2  - A1c 6.4%  - c/w Lantus 6U qHS  - c/w ISS    // Hypothyroid  - c/w Synthroid 25mcg QD    # ID:  //Leukocytosis  - Abx stopped 11/2  - Was on vanc or zosyn for leukocytosis, however cultures have been negative.   - BC - no growth final    # Heme/Onc:  //Anemia likely 2/2 RP bleed  - S/p embolization by IR   - Holding AC i/s/o RP bleed  - Needed 1u pRBCs 11/2 for hgb 6.7, since elevated to 7.2. Currently stabilized to 7.1.   - Will continue to monitor patient's Hgb for stabilization  - Transfuse if hgb < 7.0     //Elevated PTT  - Persistently abnormal PTT despite heparin being held.   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  - Heme following  - Pending Lupus profile labs - suspect lupus anticoagulant w/ acquired factor deficiency  - 11/2 - transfused 2u FFP, however PTT not corrected   - Will f/u w/ heme regarding persistent PTT elevation despite FFP infusion.     # Skin:  // No active issues    # Ethics   - FULL CODE     Edwin Wills, PGY-1   Internal Medicine  Pager: [phone] / [facility]: [phone]

## 2021-11-03 NOTE — CONSULT NOTE ADULT - SUBJECTIVE AND OBJECTIVE BOX
HPI:  72yo M w/  CAD (s/p CABG 2019), CKD (unknown stage), DM2, Parkinson's Disease, HTN, depression p/w b/l LE edema found to have ARF and NSTEMI w/ new Aflutter started on heparin gtt and HD. Course c/b RP bleed w/ anemia, admitted to the MICU for closer monitoring while on HD. Also with fluctuating mental status and confusion.     11/3 The patient was seen and examined with his son, Yunier, by his bed side. He denied pain; however, was grimacing and moaning when pressing on his LEs. He was clearly confused and with poor attention. He did not appear to be on any major distress beyond when pressing on his legs.       PERTINENT PM/SXH:   Hypertension    Diabetes Mellitus, Type 2    Hypercholesterolemia    Parkinson disease    CAD (coronary artery disease)    Leg swelling    Autoimmune disease    Insomnia    Vertigo    Gastroesophageal reflux disease without esophagitis    Nocturia    Urinary incontinence    Urinary frequency    Panic attacks    Osteoarthritis    Shoulder pain, left      Status Post Cardiac Catheterization    S/P angioplasty with stent    S/P CABG (coronary artery bypass graft)    S/P hip replacement, right    Anxiety    Depression, major      FAMILY HISTORY:  Family history of hypertension    Family history of malaria    Family history of pulmonary fibrosis (Sibling)      ITEMS NOT CHECKED ARE NOT PRESENT    SOCIAL HISTORY:   Significant other/partner[ ]  Children[ ]  [employer]ybeterian/Spirituality:  Substance hx:  [ ]   Tobacco hx:  [ ]   Alcohol hx: [ ]   Home Opioid hx:  [ ] I-Stop Reference No:  Living Situation: [x ]Home  [ ]Long term care  [ ]Rehab [ ]Other  Lives with son and his daughter in law. Was independent with ADLs until before this admission. He did not have a h/o dementia.   ADVANCE DIRECTIVES:    DNR  MOLST  [ ]  Living Will  [ ]   DECISION MAKER(s):  [x ] Health Care Proxy(s)  [ ] Surrogate(s)  [ ] Guardian           Name(s): Phone Number(s):    BASELINE (I)ADL(s) (prior to admission):  [place]: [ ]Total  [ ] Moderate [ ]Dependent    Allergies    adhesives (Rash)  latex (Urticaria)  No Known Drug Allergies    Intolerances    MEDICATIONS  (STANDING):  ascorbic acid 500 milliGRAM(s) Oral daily  atorvastatin 80 milliGRAM(s) Oral at bedtime  carbidopa/levodopa  25/100 2.5 Tablet(s) Oral <User Schedule>  carbidopa/levodopa  25/100 2 Tablet(s) Oral <User Schedule>  chlorhexidine 4% Liquid 1 Application(s) Topical <User Schedule>  cholecalciferol 1000 Unit(s) Oral daily  dextrose 40% Gel 15 Gram(s) Oral once  dextrose 5%. 1000 milliLiter(s) (50 mL/Hr) IV Continuous <Continuous>  dextrose 5%. 1000 milliLiter(s) (100 mL/Hr) IV Continuous <Continuous>  dextrose 50% Injectable 25 Gram(s) IV Push once  dextrose 50% Injectable 12.5 Gram(s) IV Push once  dextrose 50% Injectable 25 Gram(s) IV Push once  folic acid 1 milliGRAM(s) Oral daily  glucagon  Injectable 1 milliGRAM(s) IntraMuscular once  insulin glargine Injectable (LANTUS) 6 Unit(s) SubCutaneous at bedtime  insulin lispro (ADMELOG) corrective regimen sliding scale   SubCutaneous every 6 hours  levothyroxine 25 MICROGram(s) Oral daily  metoprolol tartrate 25 milliGRAM(s) Oral two times a day  midodrine 20 milliGRAM(s) Oral every 8 hours  midodrine. 20 milliGRAM(s) Oral <User Schedule>  Nephro-celeste 1 Tablet(s) Oral daily  pantoprazole  Injectable 40 milliGRAM(s) IV Push daily  polyethylene glycol 3350 17 Gram(s) Oral two times a day  QUEtiapine 25 milliGRAM(s) Oral at bedtime  senna Syrup 10 milliLiter(s) Oral at bedtime  sevelamer carbonate Powder 1600 milliGRAM(s) Oral three times a day with meals  trihexyphenidyl 2 milliGRAM(s) Oral three times a day    MEDICATIONS  (PRN):  acetaminophen     Tablet .. 650 milliGRAM(s) Oral every 6 hours PRN Mild Pain (1 - 3)  albuterol/ipratropium for Nebulization 3 milliLiter(s) Nebulizer every 6 hours PRN Shortness of Breath and/or Wheezing  guaiFENesin Oral Liquid (Sugar-Free) 200 milliGRAM(s) Oral every 6 hours PRN Cough  oxyCODONE    IR 5 milliGRAM(s) Oral every 4 hours PRN Severe Pain (7 - 10)  sodium chloride 0.9% lock flush 10 milliLiter(s) IV Push every 1 hour PRN Pre/post blood products, medications, blood draw, and to maintain line patency    PRESENT SYMPTOMS: [x ]Unable to obtain due to poor mentation   Source if other than patient:  [ ]Family   [ ]Team     Pain: [x ]yes [ ]no  QOL impact - not able to indicate  Location -                  legs   Aggravating factors - palpation   Quality - not able to indicate  Radiation - none   Timing- with palpation of LEs   Severity (0-10 scale): see pain AD  Minimal acceptable level (0-10 scale):     CPOT:    https://www.[email or web address].org/getattachment/[email or web address]/Critical-Care-Pain-Observation-Tool-(CPOT)      PAIN AD Score: 7 with palpation     [email or web address] (press ctrl +  left click to view)    Dyspnea:                           [ ]Mild [ ]Moderate [ ]Severe  Anxiety:                             [ ]Mild [ ]Moderate [ ]Severe  Fatigue:                             [ ]Mild [ ]Moderate [ ]Severe  Nausea:                             [ ]Mild [ ]Moderate [ ]Severe  Loss of appetite:              [ ]Mild [ ]Moderate [ ]Severe  Constipation:                    [ ]Mild [ ]Moderate [ ]Severe    Other Symptoms:  [ ]All other review of systems negative     Palliative Performance Status Version 2:         %    http://npcrc.org/files/news/palliative_performance_scale_ppsv2.pdf  PHYSICAL EXAM:  Vital Signs Last 24 Hrs  T(C): 36.4 (03 Nov 2021 15:00), Max: 37.3 (03 Nov 2021 08:00)  T(F): 97.6 (03 Nov 2021 15:00), Max: 99.1 (03 Nov 2021 08:00)  HR: 77 (03 Nov 2021 15:00) (65 - 110)  BP: 118/69 (03 Nov 2021 15:00) (118/69 - 169/80)  BP(mean): 98 (03 Nov 2021 14:00) (86 - 115)  RR: 18 (03 Nov 2021 15:00) (9 - 59)  SpO2: 100% (03 Nov 2021 15:00) (85% - 100%) I&O's Summary    02 Nov 2021 07:01  -  03 Nov 2021 07:00  --------------------------------------------------------  IN: 2430 mL / OUT: 0 mL / NET: 2430 mL    03 Nov 2021 07:01  -  03 Nov 2021 17:50  --------------------------------------------------------  IN: 0 mL / OUT: 25 mL / NET: -25 mL      GENERAL:  [ ]Alert  [ ]Oriented x   [x ]Lethargic  [ ]Cachexia  [ ]Unarousable  [ ]Verbal  [ ]Non-Verbal  Behavioral:   [ ] Anxiety  [x ] Delirium [ ] Agitation [ ] Other  HEENT:  [ ]Normal   [x ]Dry mouth   [ ]ET Tube/Trach  [ ]Oral lesions  PULMONARY:   [x ]Clear [ ]Tachypnea  [ ]Audible excessive secretions   [ ]Rhonchi        [ ]Right [ ]Left [ ]Bilateral  [ ]Crackles        [ ]Right [ ]Left [ ]Bilateral  [ ]Wheezing     [ ]Right [ ]Left [ ]Bilateral  [ ]Diminished breath sounds [ ]right [ ]left [ ]bilateral  CARDIOVASCULAR:    [x ]Regular [ ]Irregular [ ]Tachy  [ ]Trevon [ ]Murmur [ ]Other  GASTROINTESTINAL:  [x ]Soft  [ ]Distended   [x ]+BS  [ x]Non tender [ ]Tender  [ ]PEG [ ]OGT/ NGT  Last BM: 11/3  GENITOURINARY:  [ ]Normal [x ] Incontinent   [x ]Oliguria/Anuria   [ ]Javier  MUSCULOSKELETAL:   [ ]Normal   [x ]Weakness  [ ]Bed/Wheelchair bound [ ]Edema  NEUROLOGIC:   [ ]No focal deficits  [x ]Cognitive impairment  [ ]Dysphagia [ ]Dysarthria [ ]Paresis [x ]Other: Delirium   SKIN:   [ ]Normal    [ ]Rash  [ ]Pressure ulcer(s)       Present on admission [ ]y [ ]n [x] Venous stasis changes over LE.     CRITICAL CARE:  [ ] Shock Present  [ ]Septic [ ]Cardiogenic [ ]Neurologic [ ]Hypovolemic  [ ]  Vasopressors [ ]  Inotropes   [ ]Respiratory failure present [ ]Mechanical ventilation [ ]Non-invasive ventilatory support [ ]High flow    [ ]Acute  [ ]Chronic [ ]Hypoxic  [ ]Hypercarbic [ ]Other  [ ]Other organ failure     LABS:                        7.7    14.95 )-----------( 197      ( 03 Nov 2021 13:12 )             24.2   11-03    137  |  97  |  63<H>  ----------------------------<  205<H>  4.1   |  24  |  5.23<H>    Ca    9.1      03 Nov 2021 01:48  Phos  4.4     11-03  Mg     2.4     11-03    TPro  6.7  /  Alb  2.6<L>  /  TBili  1.5<H>  /  DBili  x   /  AST  35  /  ALT  6<L>  /  AlkPhos  136<H>  11-03  PT/INR - ( 03 Nov 2021 00:26 )   PT: 13.4 sec;   INR: 1.12 ratio         PTT - ( 03 Nov 2021 00:26 )  PTT:54.8 sec      RADIOLOGY & ADDITIONAL STUDIES:  < from: TTE with Doppler (w/Cont) (10.21.21 @ 17:11) >    Patient name: ORLIN HARDIN  YOB: 1950   Age: 71 (M)   MR#: [id number]  Study Date: 10/21/2021EF (Visual Estimate): 35 %Conclusions:  1. Calcified trileaflet aortic valve with normal opening.  Peak transaortic valve gradient equals 10 mm Hg, mean  transaortic valve gradient equals 6 mm Hg, aortic valve  velocity time integral equals 40 cm, estimated aortic valve  area equals 2.1 sqcm.  2. Endocardial visualization enhanced with intravenous  injection of Ultrasonic Enhancing Agent (Definity).  moderate to severe segmental left ventricular systolic  dysfunction. There is hypokinesis of anterior wall, septum  and apex. Visual estimate of EF about 35%.  3. Normal right ventricular size with decreased right  ventricular systolic function.  4. Normal tricuspid valve. Mild tricuspid regurgitation.  5. Estimated pulmonary artery systolic pressure equals 24  mm Hg, assuming right atrial pressure equals 8  mm Hg,  consistent with normal pulmonary pressures.  6. Bilateral pleural effusions.  ------------------------------------------------------------------------  Confirmed on  10/21/2021 - 19:08:16 by Aicha Pettit M.D.  ------------------------------------------------------------------------    < end of copied text >  < from: CT Abdomen and Pelvis w/ IV Cont (10.27.21 @ 17:13) >    EXAM:  CT ABDOMEN AND PELVIS IC                            PROCEDURE DATE:  10/27/2021    IMPRESSION:  Study limited by delayed arterial phase.    Interval increase in size of a now large left retroperitoneal hematoma measuring up to 18.3 cm in craniocaudal dimension, previously 7.4 cm, with suspicion for contrast extravasation on arterial phase imaging, suspicious for active bleed. Essentially unchanged small right retroperitoneal hematoma.    Left kidney is now anteriorly displaced by the large left retroperitoneal hematoma, which also contacts the mid to distal ureter which results in new mild left sided hydroureteronephrosis.    Unchanged moderate left and small right pleural effusions and unchanged pulmonary opacities at the lung bases.    A 1.2 cm cystic lesion in the pancreatic neck. Consider further evaluation on a nonemergent outpatient basis with MRI/MRCP.    New small ascites.    Findings regarding the left retroperitoneal hemorrhagewere discussed by Abbey DAMON with Gilberto NP on 10/27/2021 at 5:56 PM.    --- End of Report ---    < end of copied text >      PROTEIN CALORIE MALNUTRITION PRESENT: [ ]mild [ ]moderate [ ]severe [ ]underweight [ ]morbid obesity  https://www.andeal.org/vault/2440/web/files/ONC/Table_Clinical%20Characteristics%20to%20Document%20Malnutrition-White%20JV%20et%20al%202012.pdf    Height (cm): 180.3 (10-27-21 @ 21:30)  Weight (kg): 96.4 (10-27-21 @ 21:30)  BMI (kg/m2): 29.7 (10-27-21 @ 21:30)    [ ]PPSV2 < or = to 30% [ ]significant weight loss  [ ]poor nutritional intake  [ ]anasarca      [ ]Artificial Nutrition      REFERRALS:   [ ]Chaplaincy  [ ]Hospice  [ ]Child Life  [ ]Social Work  [ ]Case management [ ]Holistic Therapy     Goals of Care Document:  HPI:  70yo M w/  CAD (s/p CABG 2019), CKD (unknown stage), DM2, Parkinson's Disease, HTN, depression p/w b/l LE edema found to have ARF and NSTEMI w/ new Aflutter started on heparin gtt and HD. Course c/b RP bleed w/ anemia, admitted to the MICU for closer monitoring while on HD. Also with fluctuating mental status and confusion.     11/3 The patient was seen and examined with his son, Yunier, by his bed side. He denied pain; however, was grimacing and moaning when pressing on his LEs. He was clearly confused and with poor attention. He did not appear to be on any major distress beyond when pressing on his legs.       PERTINENT PM/SXH:   Hypertension    Diabetes Mellitus, Type 2    Hypercholesterolemia    Parkinson disease    CAD (coronary artery disease)    Leg swelling    Autoimmune disease    Insomnia    Vertigo    Gastroesophageal reflux disease without esophagitis    Nocturia    Urinary incontinence    Urinary frequency    Panic attacks    Osteoarthritis    Shoulder pain, left      Status Post Cardiac Catheterization    S/P angioplasty with stent    S/P CABG (coronary artery bypass graft)    S/P hip replacement, right    Anxiety    Depression, major      FAMILY HISTORY:  Family history of hypertension    Family history of malaria    Family history of pulmonary fibrosis (Sibling)      ITEMS NOT CHECKED ARE NOT PRESENT    SOCIAL HISTORY:   Significant other/partner[ ]  Children[ ]  Sikh/Spirituality:  Substance hx:  [ ]   Tobacco hx:  [ ]   Alcohol hx: [ ]   Home Opioid hx:  [ ] I-Stop Reference No:  Living Situation: [x ]Home  [ ]Long term care  [ ]Rehab [ ]Other  Lives with son and his daughter in law. Was independent with ADLs until before this admission. He did not have a h/o dementia.   ADVANCE DIRECTIVES:    DNR  MOLST  [ ]  Living Will  [ ]   DECISION MAKER(s):  [x ] Health Care Proxy(s)  [ ] Surrogate(s)  [ ] Guardian           Name(s): Phone Number(s):    BASELINE (I)ADL(s) (prior to admission):  [place]: [ ]Total  [ ] Moderate [ ]Dependent    Allergies    adhesives (Rash)  latex (Urticaria)  No Known Drug Allergies    Intolerances    MEDICATIONS  (STANDING):  ascorbic acid 500 milliGRAM(s) Oral daily  atorvastatin 80 milliGRAM(s) Oral at bedtime  carbidopa/levodopa  25/100 2.5 Tablet(s) Oral <User Schedule>  carbidopa/levodopa  25/100 2 Tablet(s) Oral <User Schedule>  chlorhexidine 4% Liquid 1 Application(s) Topical <User Schedule>  cholecalciferol 1000 Unit(s) Oral daily  dextrose 40% Gel 15 Gram(s) Oral once  dextrose 5%. 1000 milliLiter(s) (50 mL/Hr) IV Continuous <Continuous>  dextrose 5%. 1000 milliLiter(s) (100 mL/Hr) IV Continuous <Continuous>  dextrose 50% Injectable 25 Gram(s) IV Push once  dextrose 50% Injectable 12.5 Gram(s) IV Push once  dextrose 50% Injectable 25 Gram(s) IV Push once  folic acid 1 milliGRAM(s) Oral daily  glucagon  Injectable 1 milliGRAM(s) IntraMuscular once  insulin glargine Injectable (LANTUS) 6 Unit(s) SubCutaneous at bedtime  insulin lispro (ADMELOG) corrective regimen sliding scale   SubCutaneous every 6 hours  levothyroxine 25 MICROGram(s) Oral daily  metoprolol tartrate 25 milliGRAM(s) Oral two times a day  midodrine 20 milliGRAM(s) Oral every 8 hours  midodrine. 20 milliGRAM(s) Oral <User Schedule>  Nephro-celeste 1 Tablet(s) Oral daily  pantoprazole  Injectable 40 milliGRAM(s) IV Push daily  polyethylene glycol 3350 17 Gram(s) Oral two times a day  QUEtiapine 25 milliGRAM(s) Oral at bedtime  senna Syrup 10 milliLiter(s) Oral at bedtime  sevelamer carbonate Powder 1600 milliGRAM(s) Oral three times a day with meals  trihexyphenidyl 2 milliGRAM(s) Oral three times a day    MEDICATIONS  (PRN):  acetaminophen     Tablet .. 650 milliGRAM(s) Oral every 6 hours PRN Mild Pain (1 - 3)  albuterol/ipratropium for Nebulization 3 milliLiter(s) Nebulizer every 6 hours PRN Shortness of Breath and/or Wheezing  guaiFENesin Oral Liquid (Sugar-Free) 200 milliGRAM(s) Oral every 6 hours PRN Cough  oxyCODONE    IR 5 milliGRAM(s) Oral every 4 hours PRN Severe Pain (7 - 10)  sodium chloride 0.9% lock flush 10 milliLiter(s) IV Push every 1 hour PRN Pre/post blood products, medications, blood draw, and to maintain line patency    PRESENT SYMPTOMS: [x ]Unable to obtain due to poor mentation   Source if other than patient:  [ ]Family   [ ]Team     Pain: [x ]yes [ ]no  QOL impact - not able to indicate  Location -                  legs   Aggravating factors - palpation   Quality - not able to indicate  Radiation - none   Timing- with palpation of LEs   Severity (0-10 scale): see pain AD  Minimal acceptable level (0-10 scale):     CPOT:    https://www.[email or web address]/getattachment/[email or web address]/Critical-Care-Pain-Observation-Tool-(CPOT)      PAIN AD Score: 7 with palpation     [email or web address] (press ctrl +  left click to view)    Dyspnea:                           [ ]Mild [ ]Moderate [ ]Severe  Anxiety:                             [ ]Mild [ ]Moderate [ ]Severe  Fatigue:                             [ ]Mild [ ]Moderate [ ]Severe  Nausea:                             [ ]Mild [ ]Moderate [ ]Severe  Loss of appetite:              [ ]Mild [ ]Moderate [ ]Severe  Constipation:                    [ ]Mild [ ]Moderate [ ]Severe    Other Symptoms:  [ ]All other review of systems negative     Palliative Performance Status Version 2:  30       %    http://npcrc.org/files/news/palliative_performance_scale_ppsv2.pdf  PHYSICAL EXAM:  Vital Signs Last 24 Hrs  T(C): 36.4 (03 Nov 2021 15:00), Max: 37.3 (03 Nov 2021 08:00)  T(F): 97.6 (03 Nov 2021 15:00), Max: 99.1 (03 Nov 2021 08:00)  HR: 77 (03 Nov 2021 15:00) (65 - 110)  BP: 118/69 (03 Nov 2021 15:00) (118/69 - 169/80)  BP(mean): 98 (03 Nov 2021 14:00) (86 - 115)  RR: 18 (03 Nov 2021 15:00) (9 - 59)  SpO2: 100% (03 Nov 2021 15:00) (85% - 100%) I&O's Summary    02 Nov 2021 07:01  -  03 Nov 2021 07:00  --------------------------------------------------------  IN: 2430 mL / OUT: 0 mL / NET: 2430 mL    03 Nov 2021 07:01  -  03 Nov 2021 17:50  --------------------------------------------------------  IN: 0 mL / OUT: 25 mL / NET: -25 mL      GENERAL:  [ ]Alert  [ ]Oriented x   [x ]Lethargic  [ ]Cachexia  [ ]Unarousable  [ ]Verbal  [ ]Non-Verbal  Behavioral:   [ ] Anxiety  [x ] Delirium [ ] Agitation [ ] Other  HEENT:  [ ]Normal   [x ]Dry mouth   [ ]ET Tube/Trach  [ ]Oral lesions  PULMONARY:   [x ]Clear [ ]Tachypnea  [ ]Audible excessive secretions   [ ]Rhonchi        [ ]Right [ ]Left [ ]Bilateral  [ ]Crackles        [ ]Right [ ]Left [ ]Bilateral  [ ]Wheezing     [ ]Right [ ]Left [ ]Bilateral  [ ]Diminished breath sounds [ ]right [ ]left [ ]bilateral  CARDIOVASCULAR:    [x ]Regular [ ]Irregular [ ]Tachy  [ ]Trevon [ ]Murmur [ ]Other  GASTROINTESTINAL:  [x ]Soft  [ ]Distended   [x ]+BS  [ x]Non tender [ ]Tender  [ ]PEG [ ]OGT/ NGT  Last BM: 11/3  GENITOURINARY:  [ ]Normal [x ] Incontinent   [x ]Oliguria/Anuria   [ ]Javier  MUSCULOSKELETAL:   [ ]Normal   [x ]Weakness  [ ]Bed/Wheelchair bound [ ]Edema  NEUROLOGIC:   [ ]No focal deficits  [x ]Cognitive impairment  [ ]Dysphagia [ ]Dysarthria [ ]Paresis [x ]Other: Delirium   SKIN:   [ ]Normal    [ ]Rash  [ ]Pressure ulcer(s)       Present on admission [ ]y [ ]n [x] Venous stasis changes over LE.     CRITICAL CARE:  [ ] Shock Present  [ ]Septic [ ]Cardiogenic [ ]Neurologic [ ]Hypovolemic  [ ]  Vasopressors [ ]  Inotropes   [ ]Respiratory failure present [ ]Mechanical ventilation [ ]Non-invasive ventilatory support [ ]High flow    [ ]Acute  [ ]Chronic [ ]Hypoxic  [ ]Hypercarbic [ ]Other  [ ]Other organ failure     LABS:                        7.7    14.95 )-----------( 197      ( 03 Nov 2021 13:12 )             24.2   11-03    137  |  97  |  63<H>  ----------------------------<  205<H>  4.1   |  24  |  5.23<H>    Ca    9.1      03 Nov 2021 01:48  Phos  4.4     11-03  Mg     2.4     11-03    TPro  6.7  /  Alb  2.6<L>  /  TBili  1.5<H>  /  DBili  x   /  AST  35  /  ALT  6<L>  /  AlkPhos  136<H>  11-03  PT/INR - ( 03 Nov 2021 00:26 )   PT: 13.4 sec;   INR: 1.12 ratio         PTT - ( 03 Nov 2021 00:26 )  PTT:54.8 sec      RADIOLOGY & ADDITIONAL STUDIES:  < from: TTE with Doppler (w/Cont) (10.21.21 @ 17:11) >    Patient name: ORLIN HARDIN  YOB: 1950   Age: 71 (M)   MR#: [id number]  Study Date: 10/21/2021EF (Visual Estimate): 35 %Conclusions:  1. Calcified trileaflet aortic valve with normal opening.  Peak transaortic valve gradient equals 10 mm Hg, mean  transaortic valve gradient equals 6 mm Hg, aortic valve  velocity time integral equals 40 cm, estimated aortic valve  area equals 2.1 sqcm.  2. Endocardial visualization enhanced with intravenous  injection of Ultrasonic Enhancing Agent (Definity).  moderate to severe segmental left ventricular systolic  dysfunction. There is hypokinesis of anterior wall, septum  and apex. Visual estimate of EF about 35%.  3. Normal right ventricular size with decreased right  ventricular systolic function.  4. Normal tricuspid valve. Mild tricuspid regurgitation.  5. Estimated pulmonary artery systolic pressure equals 24  mm Hg, assuming right atrial pressure equals 8  mm Hg,  consistent with normal pulmonary pressures.  6. Bilateral pleural effusions.  ------------------------------------------------------------------------  Confirmed on  10/21/2021 - 19:08:16 by Aicha Pettit M.D.  ------------------------------------------------------------------------    < end of copied text >  < from: CT Abdomen and Pelvis w/ IV Cont (10.27.21 @ 17:13) >    EXAM:  CT ABDOMEN AND PELVIS IC                            PROCEDURE DATE:  10/27/2021    IMPRESSION:  Study limited by delayed arterial phase.    Interval increase in size of a now large left retroperitoneal hematoma measuring up to 18.3 cm in craniocaudal dimension, previously 7.4 cm, with suspicion for contrast extravasation on arterial phase imaging, suspicious for active bleed. Essentially unchanged small right retroperitoneal hematoma.    Left kidney is now anteriorly displaced by the large left retroperitoneal hematoma, which also contacts the mid to distal ureter which results in new mild left sided hydroureteronephrosis.    Unchanged moderate left and small right pleural effusions and unchanged pulmonary opacities at the lung bases.    A 1.2 cm cystic lesion in the pancreatic neck. Consider further evaluation on a nonemergent outpatient basis with MRI/MRCP.    New small ascites.    Findings regarding the left retroperitoneal hemorrhagewere discussed by Abbey DAMON with Gilberto NP on 10/27/2021 at 5:56 PM.    --- End of Report ---    < end of copied text >      PROTEIN CALORIE MALNUTRITION PRESENT: [ ]mild [ ]moderate [ ]severe [ ]underweight [ ]morbid obesity  https://www.andeal.org/vault/2440/web/files/ONC/Table_Clinical%20Characteristics%20to%20Document%20Malnutrition-White%20JV%20et%20al%202012.pdf    Height (cm): 180.3 (10-27-21 @ 21:30)  Weight (kg): 96.4 (10-27-21 @ 21:30)  BMI (kg/m2): 29.7 (10-27-21 @ 21:30)    [ ]PPSV2 < or = to 30% [ ]significant weight loss  [ ]poor nutritional intake  [ ]anasarca      [ ]Artificial Nutrition      REFERRALS:   [ ]Chaplaincy  [ ]Hospice  [ ]Child Life  [ ]Social Work  [ ]Case management [ ]Holistic Therapy     Goals of Care Document:

## 2021-11-03 NOTE — PROGRESS NOTE ADULT - SUBJECTIVE AND OBJECTIVE BOX
INTERVAL HPI/OVERNIGHT EVENTS:    SUBJECTIVE: Patient seen and examined at bedside.       VITAL SIGNS:  ICU Vital Signs Last 24 Hrs  T(C): 36.6 (03 Nov 2021 04:00), Max: 36.9 (03 Nov 2021 00:00)  T(F): 97.9 (03 Nov 2021 04:00), Max: 98.5 (03 Nov 2021 00:00)  HR: 89 (03 Nov 2021 07:00) (64 - 112)  BP: 151/80 (03 Nov 2021 07:00) (96/71 - 152/70)  BP(mean): 105 (03 Nov 2021 07:00) (73 - 105)  ABP: --  ABP(mean): --  RR: 21 (03 Nov 2021 07:00) (13 - 59)  SpO2: 100% (03 Nov 2021 07:00) (95% - 100%)      Plateau pressure:   P/F ratio:     11-02 @ 07:01  -  11-03 @ 07:00  --------------------------------------------------------  IN: 2430 mL / OUT: 0 mL / NET: 2430 mL      CAPILLARY BLOOD GLUCOSE      POCT Blood Glucose.: 189 mg/dL (03 Nov 2021 06:10)    ECG:    PHYSICAL EXAM:    General:   HEENT:   Neck:   Respiratory:   Cardiovascular:   Abdomen:   Extremities:  Neurological:    MEDICATIONS:  MEDICATIONS  (STANDING):  ascorbic acid 500 milliGRAM(s) Oral daily  atorvastatin 80 milliGRAM(s) Oral at bedtime  carbidopa/levodopa  25/100 2.5 Tablet(s) Oral <User Schedule>  carbidopa/levodopa  25/100 2 Tablet(s) Oral <User Schedule>  chlorhexidine 4% Liquid 1 Application(s) Topical <User Schedule>  cholecalciferol 1000 Unit(s) Oral daily  dextrose 40% Gel 15 Gram(s) Oral once  dextrose 5%. 1000 milliLiter(s) (50 mL/Hr) IV Continuous <Continuous>  dextrose 5%. 1000 milliLiter(s) (100 mL/Hr) IV Continuous <Continuous>  dextrose 50% Injectable 25 Gram(s) IV Push once  dextrose 50% Injectable 12.5 Gram(s) IV Push once  dextrose 50% Injectable 25 Gram(s) IV Push once  epoetin mari-epbx (RETACRIT) Injectable 05978 Unit(s) IV Push once  folic acid 1 milliGRAM(s) Oral daily  glucagon  Injectable 1 milliGRAM(s) IntraMuscular once  insulin glargine Injectable (LANTUS) 6 Unit(s) SubCutaneous at bedtime  insulin lispro (ADMELOG) corrective regimen sliding scale   SubCutaneous every 6 hours  levothyroxine 25 MICROGram(s) Oral daily  metoprolol tartrate 25 milliGRAM(s) Oral two times a day  midodrine 20 milliGRAM(s) Oral every 8 hours  midodrine. 20 milliGRAM(s) Oral <User Schedule>  Nephro-celeste 1 Tablet(s) Oral daily  pantoprazole  Injectable 40 milliGRAM(s) IV Push daily  polyethylene glycol 3350 17 Gram(s) Oral two times a day  QUEtiapine 25 milliGRAM(s) Oral at bedtime  senna Syrup 10 milliLiter(s) Oral at bedtime  sevelamer carbonate Powder 1600 milliGRAM(s) Oral three times a day with meals  trihexyphenidyl 2 milliGRAM(s) Oral three times a day    MEDICATIONS  (PRN):  acetaminophen     Tablet .. 650 milliGRAM(s) Oral every 6 hours PRN Mild Pain (1 - 3)  albuterol/ipratropium for Nebulization 3 milliLiter(s) Nebulizer every 6 hours PRN Shortness of Breath and/or Wheezing  guaiFENesin Oral Liquid (Sugar-Free) 200 milliGRAM(s) Oral every 6 hours PRN Cough  oxyCODONE    IR 5 milliGRAM(s) Oral every 4 hours PRN Severe Pain (7 - 10)  sodium chloride 0.9% lock flush 10 milliLiter(s) IV Push every 1 hour PRN Pre/post blood products, medications, blood draw, and to maintain line patency      ALLERGIES:  Allergies    adhesives (Rash)  latex (Urticaria)  No Known Drug Allergies    Intolerances        LABS:                        7.1    13.02 )-----------( 185      ( 03 Nov 2021 00:26 )             21.3     11-03    137  |  97  |  63<H>  ----------------------------<  205<H>  4.1   |  24  |  5.23<H>    Ca    9.1      03 Nov 2021 01:48  Phos  4.4     11-03  Mg     2.4     11-03    TPro  6.7  /  Alb  2.6<L>  /  TBili  1.5<H>  /  DBili  x   /  AST  35  /  ALT  6<L>  /  AlkPhos  136<H>  11-03    PT/INR - ( 03 Nov 2021 00:26 )   PT: 13.4 sec;   INR: 1.12 ratio         PTT - ( 03 Nov 2021 00:26 )  PTT:54.8 sec      RADIOLOGY & ADDITIONAL TESTS: Reviewed.   INTERVAL HPI/OVERNIGHT EVENTS:    No events overnight. The patient remained pleasantly confused.  A&Ox1 to person. He reports that he feels some pain on his back.     SUBJECTIVE: Patient seen and examined at bedside.       VITAL SIGNS:  ICU Vital Signs Last 24 Hrs  T(C): 36.6 (03 Nov 2021 04:00), Max: 36.9 (03 Nov 2021 00:00)  T(F): 97.9 (03 Nov 2021 04:00), Max: 98.5 (03 Nov 2021 00:00)  HR: 89 (03 Nov 2021 07:00) (64 - 112)  BP: 151/80 (03 Nov 2021 07:00) (96/71 - 152/70)  BP(mean): 105 (03 Nov 2021 07:00) (73 - 105)  ABP: --  ABP(mean): --  RR: 21 (03 Nov 2021 07:00) (13 - 59)  SpO2: 100% (03 Nov 2021 07:00) (95% - 100%)      Plateau pressure:   P/F ratio:     11-02 @ 07:01  -  11-03 @ 07:00  --------------------------------------------------------  IN: 2430 mL / OUT: 0 mL / NET: 2430 mL      POCT Blood Glucose.: 189 mg/dL (03 Nov 2021 06:10)      PHYSICAL EXAM:      GENERAL: NAD, AAOx1, NGT in place  HEAD:  Atraumatic, Normocephalic  CHEST/LUNG: Wheezing present on middle lung fields b/l  HEART: RRR  ABDOMEN: Soft, Nontender, Nondistended; Bowel sounds present  EXTREMITIES:  2+ Peripheral Pulses, No clubbing, cyanosis, or edema  NEURO: No focal deficits      MEDICATIONS:  MEDICATIONS  (STANDING):  ascorbic acid 500 milliGRAM(s) Oral daily  atorvastatin 80 milliGRAM(s) Oral at bedtime  carbidopa/levodopa  25/100 2.5 Tablet(s) Oral <User Schedule>  carbidopa/levodopa  25/100 2 Tablet(s) Oral <User Schedule>  chlorhexidine 4% Liquid 1 Application(s) Topical <User Schedule>  cholecalciferol 1000 Unit(s) Oral daily  dextrose 40% Gel 15 Gram(s) Oral once  dextrose 5%. 1000 milliLiter(s) (50 mL/Hr) IV Continuous <Continuous>  dextrose 5%. 1000 milliLiter(s) (100 mL/Hr) IV Continuous <Continuous>  dextrose 50% Injectable 25 Gram(s) IV Push once  dextrose 50% Injectable 12.5 Gram(s) IV Push once  dextrose 50% Injectable 25 Gram(s) IV Push once  epoetin mari-epbx (RETACRIT) Injectable 41023 Unit(s) IV Push once  folic acid 1 milliGRAM(s) Oral daily  glucagon  Injectable 1 milliGRAM(s) IntraMuscular once  insulin glargine Injectable (LANTUS) 6 Unit(s) SubCutaneous at bedtime  insulin lispro (ADMELOG) corrective regimen sliding scale   SubCutaneous every 6 hours  levothyroxine 25 MICROGram(s) Oral daily  metoprolol tartrate 25 milliGRAM(s) Oral two times a day  midodrine 20 milliGRAM(s) Oral every 8 hours  midodrine. 20 milliGRAM(s) Oral <User Schedule>  Nephro-celeste 1 Tablet(s) Oral daily  pantoprazole  Injectable 40 milliGRAM(s) IV Push daily  polyethylene glycol 3350 17 Gram(s) Oral two times a day  QUEtiapine 25 milliGRAM(s) Oral at bedtime  senna Syrup 10 milliLiter(s) Oral at bedtime  sevelamer carbonate Powder 1600 milliGRAM(s) Oral three times a day with meals  trihexyphenidyl 2 milliGRAM(s) Oral three times a day    MEDICATIONS  (PRN):  acetaminophen     Tablet .. 650 milliGRAM(s) Oral every 6 hours PRN Mild Pain (1 - 3)  albuterol/ipratropium for Nebulization 3 milliLiter(s) Nebulizer every 6 hours PRN Shortness of Breath and/or Wheezing  guaiFENesin Oral Liquid (Sugar-Free) 200 milliGRAM(s) Oral every 6 hours PRN Cough  oxyCODONE    IR 5 milliGRAM(s) Oral every 4 hours PRN Severe Pain (7 - 10)  sodium chloride 0.9% lock flush 10 milliLiter(s) IV Push every 1 hour PRN Pre/post blood products, medications, blood draw, and to maintain line patency      ALLERGIES:  Allergies    adhesives (Rash)  latex (Urticaria)  No Known Drug Allergies    Intolerances        LABS:                        7.1    13.02 )-----------( 185      ( 03 Nov 2021 00:26 )             21.3     11-03    137  |  97  |  63<H>  ----------------------------<  205<H>  4.1   |  24  |  5.23<H>    Ca    9.1      03 Nov 2021 01:48  Phos  4.4     11-03  Mg     2.4     11-03    TPro  6.7  /  Alb  2.6<L>  /  TBili  1.5<H>  /  DBili  x   /  AST  35  /  ALT  6<L>  /  AlkPhos  136<H>  11-03    PT/INR - ( 03 Nov 2021 00:26 )   PT: 13.4 sec;   INR: 1.12 ratio         PTT - ( 03 Nov 2021 00:26 )  PTT:54.8 sec      RADIOLOGY & ADDITIONAL TESTS: Reviewed.

## 2021-11-03 NOTE — CONSULT NOTE ADULT - PROBLEM SELECTOR RECOMMENDATION 3
Suggest to continue medications, monitoring, FU primary team recommendations.
-  -history of cabg  -recommend switching to aspirin 81 mg daily  -continue with lipitor 80 mg daily  -eventual cardiac cath once renal function closer to baseline  -continue with tele monitoring  -tte as above.
See GOC above.

## 2021-11-04 NOTE — PROGRESS NOTE ADULT - PROBLEM SELECTOR PLAN 1
Suggest to switch diet orders to include consistent carb.  Will continue current insulin regimen for now. Will continue monitoring FS, log, and FU.  Patient previously on larger dose insulin (Tresiba and Novolog), will continue monitoring and FU DC recommendations.

## 2021-11-04 NOTE — SWALLOW BEDSIDE ASSESSMENT ADULT - ASR SWALLOW ASPIRATION MONITOR
Monitor for s/s aspiration/laryngeal penetration. If noted:  D/C p.o. intake, provide non-oral nutrition/hydration/meds, and contact this service @ [phone]/change of breathing pattern/cough/gurgly voice/fever/pneumonia/throat clearing/upper respiratory infection

## 2021-11-04 NOTE — PROGRESS NOTE ADULT - ASSESSMENT
Assessment  DMT2: 71y Male with DM T2 with hyperglycemia, A1C 6.4%, was on insulin at home, now on low-dose basal insulin and coverage, blood sugars are trending within acceptable range, no hypoglycemic episodes. Diet advanced to soft diet, had some applesauce for lunch per discussion with RN, diet orders noted to be nondiabetic.  Hypothyroidism: Patient has no history thyroid disease, was not on any thyroid supplements, subclinical with low-normal FT4, lethargic, now on synthroid 25 mcg po daily.  CHF: on medications, stable, monitored.  HTN: Controlled,  on antihypertensive medications.  Parkinsons: on meds, monitored.  CKD: Monitor labs/BMP      Kelsie Reece MD  Cell: [phone]  Office: [phone]     Assessment  DMT2: 71y Male with DM T2 with hyperglycemia, A1C 6.4%, was on insulin at home, now on low-dose basal insulin and coverage, blood sugars are  trending within acceptable range, no hypoglycemic episodes. Diet advanced to soft diet, had some applesauce for lunch per discussion with RN, diet orders noted to be nondiabetic.  Hypothyroidism: Patient has no history thyroid disease, was not on any thyroid supplements, subclinical with low-normal FT4, lethargic, now on synthroid 25 mcg po daily.  CHF: on medications, stable, monitored.  HTN: Controlled,  on antihypertensive medications.  Parkinsons: on meds, monitored.  CKD: Monitor labs/BMP      Kelsie Reece MD  Cell: [phone]  Office: [phone]

## 2021-11-04 NOTE — SWALLOW BEDSIDE ASSESSMENT ADULT - COMMENTS
MICU course signficant for Delerium on Precedex gtt, stopped 11/1; started on Seroquel; on Sinemet for PD; A/C held i/s/o RP bleed; empiric antibiotic treatment with vanc/zosyn (10/29-11/2) for leukocytosis, infectious w/u has been negative; underwent dialysis c/b hypotension requiring vasopressors and started on midodrine and was able to tolerate HD with 1.4L fluid removed on 11/3. He was found with persistently elevated aPTT and +Lupus anticoagulant with plan per Hemeonc with supportive care, transfuse FFP if pending procedure/intervention and outpatient followup.   Per Palliative C d/w family, patient's confusion/cognitive status is acute change from PTA; son states that patient has not had any memory issues and was able to care by himself until right before this admission. Son Yunier is an EMS and his wife is a nurse at [employer]; current goals appear to be towards improving acute issues so patient can go back to a levels closer to were he was before this admission. MICU course signficant for Delerium on Precedex gtt, stopped 11/1; started on Seroquel; on Sinemet for PD; A/C held i/s/o RP bleed; empiric antibiotic treatment with vanc/zosyn (10/29-11/2) for leukocytosis, infectious w/u has been negative; underwent dialysis c/b hypotension requiring vasopressors and started on midodrine and was able to tolerate HD with 1.4L fluid removed on 11/3. He was found with persistently elevated aPTT and +Lupus anticoagulant with plan per Hemeonc with supportive care, transfuse FFP if pending procedure/intervention and outpatient followup.   Per Palliative GOC d/w family, patient's confusion/cognitive status is acute change from PTA; son states that patient was able to care by himself until right before this admission. Alton Faye is an EMS and his wife is a nurse at [employer]; current goals appear to be towards improving acute issues so patient can go back to a levels closer to were he was before this admission.

## 2021-11-04 NOTE — PROGRESS NOTE ADULT - SUBJECTIVE AND OBJECTIVE BOX
Patient is a 71y old  Male who presents with a chief complaint of leg swelling (04 Nov 2021 14:38)      INTERVAL HPI/OVERNIGHT EVENTS: noted  pt seen and examined this am   events noted  feels well  improved groin pain      Vital Signs Last 24 Hrs  T(C): 36.6 (04 Nov 2021 20:01), Max: 39.7 (04 Nov 2021 08:00)  T(F): 97.9 (04 Nov 2021 20:01), Max: 103.5 (04 Nov 2021 08:00)  HR: 81 (04 Nov 2021 20:01) (60 - 119)  BP: 145/77 (04 Nov 2021 20:01) (112/55 - 167/74)  BP(mean): 106 (04 Nov 2021 17:00) (79 - 114)  RR: 18 (04 Nov 2021 20:08) (14 - 27)  SpO2: 93% (04 Nov 2021 20:08) (89% - 100%)    acetaminophen     Tablet .. 650 milliGRAM(s) Oral every 6 hours PRN  albuterol/ipratropium for Nebulization 3 milliLiter(s) Nebulizer every 6 hours PRN  ascorbic acid 500 milliGRAM(s) Oral daily  atorvastatin 80 milliGRAM(s) Oral at bedtime  carbidopa/levodopa  25/100 2 Tablet(s) Oral <User Schedule>  carbidopa/levodopa  25/100 2.5 Tablet(s) Oral <User Schedule>  cholecalciferol 1000 Unit(s) Oral daily  dextrose 40% Gel 15 Gram(s) Oral once  dextrose 5%. 1000 milliLiter(s) IV Continuous <Continuous>  dextrose 5%. 1000 milliLiter(s) IV Continuous <Continuous>  dextrose 50% Injectable 25 Gram(s) IV Push once  dextrose 50% Injectable 12.5 Gram(s) IV Push once  dextrose 50% Injectable 25 Gram(s) IV Push once  folic acid 1 milliGRAM(s) Oral daily  glucagon  Injectable 1 milliGRAM(s) IntraMuscular once  guaiFENesin Oral Liquid (Sugar-Free) 200 milliGRAM(s) Oral every 6 hours PRN  insulin glargine Injectable (LANTUS) 6 Unit(s) SubCutaneous at bedtime  insulin lispro (ADMELOG) corrective regimen sliding scale   SubCutaneous every 6 hours  levothyroxine 25 MICROGram(s) Oral daily  metoprolol tartrate 25 milliGRAM(s) Oral two times a day  midodrine 20 milliGRAM(s) Oral <User Schedule>  polyethylene glycol 3350 17 Gram(s) Oral two times a day  QUEtiapine 25 milliGRAM(s) Oral at bedtime  senna Syrup 10 milliLiter(s) Oral at bedtime  sevelamer carbonate 1600 milliGRAM(s) Oral three times a day  sodium chloride 0.9% lock flush 10 milliLiter(s) IV Push every 1 hour PRN  trihexyphenidyl 2 milliGRAM(s) Oral three times a day      PHYSICAL EXAM:  GENERAL: NAD,   EYES: conjunctiva and sclera clear  ENMT: Moist mucous membranes  NECK: Supple, No JVD, Normal thyroid  CHEST/LUNG: non labored, cta b/l  HEART: Regular rate and rhythm; No murmurs, rubs, or gallops  ABDOMEN: Soft, Nontender, Nondistended; Bowel sounds present  EXTREMITIES:  2+ Peripheral Pulses, No clubbing, cyanosis, or edema  LYMPH: No lymphadenopathy noted  SKIN: No rashes or lesions    Consultant(s) Notes Reviewed:  [x ] YES  [ ] NO  Care Discussed with Consultants/Other Providers [ x] YES  [ ] NO    LABS:                        7.7    15.00 )-----------( 162      ( 04 Nov 2021 00:30 )             25.1     11-04    135  |  96  |  38<H>  ----------------------------<  149<H>  4.0   |  22  |  3.81<H>    Ca    9.1      04 Nov 2021 00:30  Phos  3.9     11-04  Mg     2.2     11-04    TPro  7.2  /  Alb  2.7<L>  /  TBili  1.8<H>  /  DBili  x   /  AST  43<H>  /  ALT  8<L>  /  AlkPhos  141<H>  11-04    PT/INR - ( 04 Nov 2021 00:30 )   PT: 13.2 sec;   INR: 1.11 ratio         PTT - ( 04 Nov 2021 00:30 )  PTT:58.4 sec    CAPILLARY BLOOD GLUCOSE      POCT Blood Glucose.: 191 mg/dL (04 Nov 2021 16:57)  POCT Blood Glucose.: 136 mg/dL (04 Nov 2021 11:36)  POCT Blood Glucose.: 137 mg/dL (04 Nov 2021 06:10)  POCT Blood Glucose.: 122 mg/dL (03 Nov 2021 22:25)              RADIOLOGY & ADDITIONAL TESTS:    Imaging Personally Reviewed:  [x ] YES  [ ] NO

## 2021-11-04 NOTE — SWALLOW BEDSIDE ASSESSMENT ADULT - SLP GENERAL OBSERVATIONS
Pt is found seated upright in chair. AA+Ox1; [place] reoriented to hospital; knows where he lives ([place]); Knows he lives with his kids; vocal quality wfl for age/gender; no e/o dysarthria. Verbal output fluent. Able to follow directives.

## 2021-11-04 NOTE — PROGRESS NOTE ADULT - ASSESSMENT
70yo M w/ PMHx of CAD (s/p CABG 2019), CKD (unknown stage), DM2, Parkinson's Disease, HTN, depression presents with new onset acute heart failure exacerbation, NSTEMI, started on hep gtt, now with bl RP bleed, acute anemia. transferred to MICU.    # Acute systolic and diastolic heart failure  # NSTEMI  # ABBY on CKD- newly started HD  # Atrial flutter  # acute hypoxic resp failure  # hemorrhagic shock, left RP hematoma, acute blood loss anemia  TTE mod to severe LV SD, hypokinesis anterior wall, not candidate for cath at this point  HD per renal  10/28 sp IR embolization l4 and l5 lumbar artery  consider repeat CTA as h/h not improved  on midodrine, requiring pressors during dialysis  NGT tube feeds  precedex  appreciate ICU care    # PTT prolonged  did not correct on mixing study, heme following    # DM2 (diabetes mellitus, type 2)  per endo  hba1c 6.4    # CAD (coronary artery disease)  c/w atorvastatin  asa on hold    # Parkinsons disease   # delirium  carbidopa/levodopa restarted  c/w trihexyphenidyl  seroquel    PCP Dr. Simons

## 2021-11-04 NOTE — PROGRESS NOTE ADULT - ATTENDING COMMENTS
1. RP bleed bilaterally. L RP bleed embolized by IR.Hb stable last 48hrs.. Hemodynamically stable . HR  50-60s. on lopressor. PTT did not correct with FFP. Pt with lupus anticoagulant  3.ABBY from ATN . .Pt tolerated HD wihout additional midodrine.  4. Delirium: Multifactorial. Fever, hypotension, ICU. Pt now in  room with windows. Continue Precedex. Avoid ativan and versed.  today. Continue Seroquel 25mg at night. Still delirious but more coherent today. Attempt swallow evaluation today.  5.Parkinson's disease.  Continue Sinemet.  6. Fevers with increased wbc. WBC stable ~14. No further fevers. . Stop vancomycin and zosyn.  7. Bp stable off midodrine.

## 2021-11-04 NOTE — CHART NOTE - NSCHARTNOTEFT_GEN_A_CORE
Nutrition Follow Up Note  Patient seen for: Nutrition Follow up/ EN initiation    Chart reviewed, events noted. "72 y/o Male w/ PMHx of CAD (s/p CABG ), CKD (unknown stage), DM2, Parkinson's Disease, HTN, depression presents with new onset acute heart failure exacerbation, NSTEMI, started on hep gtt, now with bl RP bleed, acute anemia. transferred to MICU".    Nutrition-Related Events:  - Pt pulled NGT 11/3  - Pt ordered for bedside swallow evaluation  and diet now advanced to Soft + Bite sized  - Pt continues on HD; plan for perm-a-cath placement by IR next week  - Pt ordered for 1.5L fluid restriction  - Pt continues to be ordered for Phoslo for hyperphosphatemia    Source: [] Patient       [x] EMR        [x] RN        [] Family at bedside       [] Other:    -If unable to interview patient: [] Trach/Vent/BiPAP  [x] Disoriented/confused/inappropriate to interview    Diet: Soft and Bite Sized + Renal (advanced this morning )    - Is current order appropriate/adequate? [x] Yes     Previous EN infusion per flow sheets:  : 1140 ml (86% of previous EN volume goal)  11/3: 330 ml (pt pulled NGT) (25% of previous EN volume goal)    - PO intake: pt diet just advanced for lunch today    - Nutrition-related concerns:  - Pt continues with delirium  - Last HD 11/3 with 1.4L output  - No GI distress noted per RN; last bowel movement  per flow sheets; pt ordered for senna & miralax  - Pt ordered for ISS admelog and lantus for glycemic control  - Pt continues to be ordered for ascorbic acid, Nephro-Celeste, folic acid & cholecalciferol    Weights:   Daily Weight in k.4 (), Weight in k.6 (), Weight in k (), Weight in k.4 (), Weight in k.4 (10-), Weight in k.4 (10-30); daily weights noted; weight changes likely secondary to fluid shifts from HD; will continue to monitor trends as able    MEDICATIONS  (STANDING):  ascorbic acid  atorvastatin  cholecalciferol  dextrose 40% Gel  dextrose 5%.  dextrose 5%.  dextrose 50% Injectable  dextrose 50% Injectable  dextrose 50% Injectable  folic acid  glucagon  Injectable  insulin glargine Injectable (LANTUS)  insulin lispro (ADMELOG) corrective regimen sliding scale  levothyroxine  metoprolol tartrate  midodrine  Nephro-celeste  pantoprazole  Injectable  polyethylene glycol 3350  senna Syrup    Pertinent Labs:  @ 00:30: Na 135, BUN 38<H>, Cr 3.81<H>, <H>, K+ 4.0, Phos 3.9, Mg 2.2, Alk Phos 141<H>, ALT/SGPT 8<L>, AST/SGOT 43<H>, HbA1c --    A1C with Estimated Average Glucose Result: 6.4 % (10-22-21 @ 08:56)    Finger Sticks:  POCT Blood Glucose.: 136 mg/dL ( @ 11:36)  POCT Blood Glucose.: 137 mg/dL ( @ 06:10)  POCT Blood Glucose.: 122 mg/dL ( @ 22:25)  POCT Blood Glucose.: 131 mg/dL ( @ 18:34)  POCT Blood Glucose.: 139 mg/dL ( @ 13:08)    Triglycerides, Serum: 35 mg/dL (10-22-21 @ 13:31)    Skin per nursing documentation: right buttock stage 2 pressure injury per flow sheets  Edema: 2+ left arm, 3+ right foot; left foot; right ankle; left ankle; right knee; left knee; right leg; left leg; scrotum per flow sheets    Estimated Needs: based on IBW of 78 kg:  [x] recalculated: with consideration for HD  Estimated Energy Needs: 1238-1732 kcal (30-35 kcal/kg)  Estimated Protein Needs:  gm protein (1.2-1.4 g/kg)  Defer fluid needs to team    Previous Nutrition Diagnosis: Increased Nutrient Needs, Food and Nutrition Related Knowledge Deficit, Severe, acute on chronic malnutrition  Nutrition Diagnosis is: [x] ongoing, addressed with micronutrient supplementation, previously on EN feeds, now transitioned to PO diet    New Nutrition Diagnosis: N/A    Nutrition Care Plan:  [x] In Progress  [] Achieved  [] Not applicable    Recommendations:      1. Continue with current PO diet as medically appropriate; defer PO diet texture per team/ speech therapist  2. Continue with micronutrient supplementation as appropriate  3. Consider removing 500 mg Vit C daily for contraindication with renal disease    Monitoring and Evaluation:   Continue to monitor nutritional intake, tolerance to diet prescription, weights, labs, skin integrity    RD remains available upon request and will follow up per protocol  Allyssa Kumar MS, RD, CDN, [facility] pgr #[phone] Nutrition Follow Up Note  Patient seen for: Nutrition Follow up/ EN initiation    Chart reviewed, events noted. "70 y/o Male w/ PMHx of CAD (s/p CABG ), CKD (unknown stage), DM2, Parkinson's Disease, HTN, depression presents with new onset acute heart failure exacerbation, NSTEMI, started on hep gtt, now with bl RP bleed, acute anemia. transferred to MICU".    Nutrition-Related Events:  - Pt pulled NGT 11/3  - Pt ordered for bedside swallow evaluation  and diet now advanced to Soft + Bite sized  - Pt continues on HD; plan for perm-a-cath placement by IR next week  - Pt ordered for 1.5L fluid restriction  - Pt continues to be ordered for Phoslo for hyperphosphatemia    Source: [] Patient       [x] EMR        [x] RN        [] Family at bedside       [] Other:    -If unable to interview patient: [] Trach/Vent/BiPAP  [x] Disoriented/confused/inappropriate to interview    Diet: Soft and Bite Sized + Renal (advanced this morning )    - Is current order appropriate/adequate? [x] Yes     Previous EN infusion per flow sheets:  : 1140 ml (86% of previous EN volume goal)  11/3: 330 ml (pt pulled NGT) (25% of previous EN volume goal)    - PO intake: pt diet just advanced for lunch today    - Nutrition-related concerns:  - Pt continues with delirium  - Last HD 11/3 with 1.4L output  - No GI distress noted per RN; last bowel movement  per flow sheets; pt ordered for senna & miralax  - Pt ordered for ISS admelog and lantus for glycemic control  - Pt continues to be ordered for ascorbic acid, Nephro-Celeste, folic acid & cholecalciferol    Weights:   Daily Weight in k.4 (), Weight in k.6 (), Weight in k (), Weight in k.4 (), Weight in k.4 (10-), Weight in k.4 (10-30); daily weights noted; weight changes likely secondary to fluid shifts from HD; will continue to monitor trends as able    MEDICATIONS  (STANDING):  ascorbic acid  atorvastatin  cholecalciferol  dextrose 40% Gel  dextrose 5%.  dextrose 5%.  dextrose 50% Injectable  dextrose 50% Injectable  dextrose 50% Injectable  folic acid  glucagon  Injectable  insulin glargine Injectable (LANTUS)  insulin lispro (ADMELOG) corrective regimen sliding scale  levothyroxine  metoprolol tartrate  midodrine  Nephro-celeste  pantoprazole  Injectable  polyethylene glycol 3350  senna Syrup    Pertinent Labs:  @ 00:30: Na 135, BUN 38<H>, Cr 3.81<H>, <H>, K+ 4.0, Phos 3.9, Mg 2.2, Alk Phos 141<H>, ALT/SGPT 8<L>, AST/SGOT 43<H>, HbA1c --    A1C with Estimated Average Glucose Result: 6.4 % (10-22-21 @ 08:56)    Finger Sticks:  POCT Blood Glucose.: 136 mg/dL ( @ 11:36)  POCT Blood Glucose.: 137 mg/dL ( @ 06:10)  POCT Blood Glucose.: 122 mg/dL ( @ 22:25)  POCT Blood Glucose.: 131 mg/dL ( @ 18:34)  POCT Blood Glucose.: 139 mg/dL ( @ 13:08)    Triglycerides, Serum: 35 mg/dL (10-22-21 @ 13:31)    Skin per nursing documentation: right buttock stage 2 pressure injury per flow sheets  Edema: 2+ left arm, 3+ right foot; left foot; right ankle; left ankle; right knee; left knee; right leg; left leg; scrotum per flow sheets    Estimated Needs: based on IBW of 78 kg:  [x] recalculated: with consideration for HD  Estimated Energy Needs: 2626-4628 kcal (30-35 kcal/kg)  Estimated Protein Needs:  gm protein (1.2-1.4 g/kg)  Defer fluid needs to team    Previous Nutrition Diagnosis: Increased Nutrient Needs, Food and Nutrition Related Knowledge Deficit, Severe, acute on chronic malnutrition  Nutrition Diagnosis is: [x] ongoing, addressed with micronutrient supplementation, previously on EN feeds, now transitioned to PO diet    New Nutrition Diagnosis: N/A    Nutrition Care Plan:  [x] In Progress  [] Achieved  [] Not applicable    Recommendations:      1. Continue with current PO diet as medically appropriate; defer PO diet texture per team/ speech therapist  2. Continue with micronutrient supplementation as appropriate  3. Consider removing 500 mg Vit C daily for contraindication with renal disease  4. Consider adding Nepro oral nutrition supplement 1x/day to optimize nutritional intake    Monitoring and Evaluation:   Continue to monitor nutritional intake, tolerance to diet prescription, weights, labs, skin integrity    RD remains available upon request and will follow up per protocol  Allyssa Kumar MS, RD, CDN, [facility] pgr #[phone]

## 2021-11-04 NOTE — PROGRESS NOTE ADULT - ASSESSMENT
70yo M w/ PMH of CAD (s/p CABG 2019), CKD (unknown stage), DM2, Parkinson's Disease, HTN, depression p/w b/l LE edema found to have ARF and NSTEMI w/ new Aflutter started on heparin gtt and HD. Course c/b RP bleed w/ anemia, admitted to the MICU for closer monitoring while on HD.    # Neuro:  //Delirium  - A&Ox0 this morning  - Continue to monitor  - Continue seroquel  - Was on precedex however developed delirium. Was stopped on 11/1.     // Parkinson's Dz  - Continue sinemet    # CV:  // CAD/ NSTEMI  - TTE mod to severe LVS dysfxn, hypokinesis anterior wall  - Holding heparin gtt and ASA given increasing RP bleed  - c/w Atorvastatin  - Unstable for cath at this moment  //Hypotension:  - Likely 2/2 hemorrhagic vs cardiogenic shock.  - Currently on midodrine 20 mg, MAP goal > 65  - Give midodrine 20 mg 30 mins prior to dialysis    // Aflutter  - c/w Lopressor 25mg BID  - Monitor for hypotension given RP bleed    # Pulm:  - On 2L NC  - Wean as tolerated  - B/L L >> R pleural effusions - however they are not affecting patient's respiratory status  - SpO2 > 92%  - Will not be pursuing thoracentesis at this time    # GI:  - No active issues  - Diet: Renal - via NGT  - Fluid Restrict to 1.5L    # Renal:  // ABBY on CKD  - s/p shiley placement w/ IR (placed 10/28)  - c/w Phoslo 1337 tid  - HD per nephro    # Endo  // DM2  - A1c 6.4%  - c/w Lantus 6U qHS  - c/w ISS    // Hypothyroid  - c/w Synthroid 25mcg QD    # ID:  //Leukocytosis  - Abx stopped 11/2  - Was on vanc or zosyn for leukocytosis, however cultures have been negative.   - BC - no growth final    # Heme/Onc:  //Anemia likely 2/2 RP bleed  - S/p embolization by IR   - Holding AC i/s/o RP bleed  - Needed 1u pRBCs 11/2 for hgb 6.7, since elevated to 7.2. Currently stabilized to 7.1.   - Will continue to monitor patient's Hgb for stabilization  - Transfuse if hgb < 7.0     //Elevated PTT  - Persistently abnormal PTT despite heparin being held.   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  - Heme following  - Pending Lupus profile labs - suspect lupus anticoagulant w/ acquired factor deficiency  - 11/2 - transfused 2u FFP, however PTT not corrected   - Will f/u w/ heme regarding persistent PTT elevation despite FFP infusion.     # Skin:  // No active issues    # Ethics   - FULL CODE     Edwin Wills, PGY-1   Internal Medicine  Pager: [phone] / [facility]: [phone]  70yo M w/ PMH of CAD (s/p CABG 2019), CKD (unknown stage), DM2, Parkinson's Disease, HTN, depression p/w b/l LE edema found to have ARF and NSTEMI w/ new Aflutter started on heparin gtt and HD. Course c/b RP bleed w/ anemia, admitted to the MICU for closer monitoring while on HD.    # Neuro:  //Delirium  - A&Ox0 this morning  - Continue to monitor  - Continue seroquel  - Was on precedex however developed delirium. Was stopped on 11/1.     // Parkinson's Dz  - Continue sinemet    # CV:  // CAD/ NSTEMI  - TTE mod to severe LVS dysfxn, hypokinesis anterior wall  - Holding heparin gtt and ASA given RP bleed - may reconsider w/ resolving bleed and uptrending Hgb  - c/w Atorvastatin  - Cards following, will likely need to start GDMT for CAD and NSTEMI     //Hypotension - Resolving:  - Likely 2/2 hemorrhagic shock, however Hgb uptrending w/ increasing MAPs. Midodrine held since 10 PM 2/2 good MAPs.  - Tolerated dialysis w/o midodrine  - Stopped standing midodrine  - Continue 10 midodrine 30 mins prior to dialysis if he still needs it to maintain MAPs    // Aflutter  - c/w Lopressor 25mg BID      # Pulm:  // B/l pleural effusions seen on POCUS, however asymptomatic  - 1L NC  - SpO2 goal > 92%     # GI:  - No active issues  - Diet: Renal - via NGT  - Fluid Restrict to 1.5L    # Renal:  // ABBY on CKD  - s/p shiley placement w/ IR (placed 10/28)  - c/w Phoslo 1337 tid  - HD per nephro    # Endo  // DM2  - A1c 6.4%  - c/w Lantus 6U qHS  - c/w ISS    // Hypothyroid  - c/w Synthroid 25mcg QD    # ID:  //Leukocytosis  - Abx stopped 11/2  - Was on vanc or zosyn for leukocytosis, however cultures have been negative.   - BC - no growth final    # Heme/Onc:  //Anemia likely 2/2 RP bleed  - S/p embolization by IR   - Holding AC i/s/o RP bleed  - Needed 1u pRBCs 11/2 for hgb 6.7, since elevated to 7.2. Currently stabilized to 7.1.   - Will continue to monitor patient's Hgb for stabilization  - Transfuse if hgb < 7.0     //Elevated PTT  - Persistently abnormal PTT despite heparin being held.   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  - 11/2 - transfused 2u FFP, however PTT not corrected   - Heme following  - + Lupus anticoagulant - will follow up w/ heme regarding this    # Skin:  // No active issues    # Ethics   - FULL CODE   - Palliative Care following    Edwin Wills, PGY-1   Internal Medicine  Pager: [phone] / [facility]: [phone]  70yo M w/ PMH of CAD (s/p CABG 2019), CKD (unknown stage), DM2, Parkinson's Disease, HTN, depression p/w b/l LE edema found to have ARF and NSTEMI w/ new Aflutter started on heparin gtt and HD. Course c/b RP bleed w/ anemia, admitted to the MICU for closer monitoring while on HD.    # Neuro:  //Delirium  - A&Ox0 this morning  - Continue to monitor  - Continue seroquel  - Was on precedex however developed delirium. Was stopped on 11/1.     // Parkinson's Dz  - Continue sinemet    # CV:  // CAD/ NSTEMI  - TTE mod to severe LVS dysfxn, hypokinesis anterior wall  - Holding heparin gtt and ASA given RP bleed - may reconsider w/ resolving bleed and uptrending Hgb  - c/w Atorvastatin  - Cards following, will likely need to start GDMT for CAD and NSTEMI     //Hypotension - Resolving:  - Likely 2/2 hemorrhagic shock, however Hgb uptrending w/ increasing MAPs. Midodrine held since 10 PM 2/2 good MAPs.  - Tolerated dialysis w/o midodrine  - Stopped standing midodrine  - Continue 10 midodrine 30 mins prior to dialysis if he still needs it to maintain MAPs    // Aflutter  - c/w Lopressor 25mg BID      # Pulm:  // B/l pleural effusions seen on POCUS, however asymptomatic  - 1L NC  - SpO2 goal > 92%     # GI:  - No active issues  - S/S evaluated patient, continue soft diet, however if unable to get soft diet due to issues in the kitchen, ok to advance to regular. Patient did not have any dysphagia.     # Renal:  // ABBY on CKD  - s/p shiley placement w/ IR (placed 10/28)  - c/w Phoslo 1337 tid  - HD per nephro    # Endo  // DM2  - A1c 6.4%  - c/w Lantus 6U qHS  - c/w ISS    // Hypothyroid  - c/w Synthroid 25mcg QD    # ID:  //Leukocytosis  - Abx stopped 11/2  - Was on vanc or zosyn for leukocytosis, however cultures have been negative.   - BC - no growth final    # Heme/Onc:  //Anemia likely 2/2 RP bleed  - S/p embolization by IR   - Holding AC i/s/o RP bleed  - Needed 1u pRBCs 11/2 for hgb 6.7, since elevated to 7.2. Currently stabilized to 7.1.   - Will continue to monitor patient's Hgb for stabilization  - Transfuse if hgb < 7.0     //Elevated PTT  - Persistently abnormal PTT despite heparin being held.   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  - 11/2 - transfused 2u FFP, however PTT not corrected   - Heme following  - + Lupus anticoagulant - will follow up w/ heme regarding this    # Skin:  // No active issues    # Ethics   - FULL CODE   - Palliative Care following    Edwin Wills, PGY-1   Internal Medicine  Pager: [phone] / [facility]: [phone]

## 2021-11-04 NOTE — PROGRESS NOTE ADULT - SUBJECTIVE AND OBJECTIVE BOX
Follow-up Pulm Progress Note  Brayan Verma MD  [phone]    No new respiratory events overnight.    Appears comfortble on nasal cannula: awake and interactive,: OOB in chair  Hb stable at 7.7      Vital Signs Last 24 Hrs  T(C): 39.7 (04 Nov 2021 08:00), Max: 39.7 (04 Nov 2021 08:00)  T(F): 103.5 (04 Nov 2021 08:00), Max: 103.5 (04 Nov 2021 08:00)  HR: 82 (04 Nov 2021 14:00) (60 - 119)  BP: 121/56 (04 Nov 2021 14:00) (98/57 - 161/89)  BP(mean): 81 (04 Nov 2021 14:00) (75 - 114)  RR: 14 (04 Nov 2021 14:00) (14 - 27)  SpO2: 95% (04 Nov 2021 14:00) (89% - 100%)                        7.7    15.00 )-----------( 162      ( 04 Nov 2021 00:30 )             25.1     11-04    135  |  96  |  38<H>  ----------------------------<  149<H>  4.0   |  22  |  3.81<H>    Ca    9.1      04 Nov 2021 00:30  Phos  3.9     11-04  Mg     2.2     11-04    TPro  7.2  /  Alb  2.7<L>  /  TBili  1.8<H>  /  DBili  x   /  AST  43<H>  /  ALT  8<L>  /  AlkPhos  141<H>  11-04    Physical Examination:  PULM: Diminished basilar BS  CVS: Regular rate and rhythm, no murmurs, rubs, or gallops  ABD: Soft, non-tender  EXT:  No clubbing, cyanosis, or edema    RADIOLOGY REVIEWED  CXR:    CT chest:    TTE:

## 2021-11-04 NOTE — PROGRESS NOTE ADULT - SUBJECTIVE AND OBJECTIVE BOX
INTERVAL HPI/OVERNIGHT EVENTS:    Patient's pressures were stable overnight, received HD, no need for midodrine. Tolerated HD well. This morning the patient was notably still confused, however was comfortable in bed. The patient was A&Ox0, however was communicating and responding to commands well.     SUBJECTIVE: Patient seen and examined at bedside.       VITAL SIGNS:  ICU Vital Signs Last 24 Hrs  T(C): 39.7 (04 Nov 2021 08:00), Max: 39.7 (04 Nov 2021 08:00)  T(F): 103.5 (04 Nov 2021 08:00), Max: 103.5 (04 Nov 2021 08:00)  HR: 107 (04 Nov 2021 07:00) (77 - 119)  BP: 161/89 (04 Nov 2021 07:00) (98/57 - 169/80)  BP(mean): 114 (04 Nov 2021 07:00) (75 - 115)  ABP: --  ABP(mean): --  RR: 27 (04 Nov 2021 07:00) (9 - 28)  SpO2: 97% (04 Nov 2021 07:00) (85% - 100%)      Plateau pressure:   P/F ratio:     11-03 @ 07:01  -  11-04 @ 07:00  --------------------------------------------------------  IN: 10 mL / OUT: 1425 mL / NET: -1415 mL      CAPILLARY BLOOD GLUCOSE      POCT Blood Glucose.: 137 mg/dL (04 Nov 2021 06:10)    PHYSICAL EXAM:    General: resting comfortably in bed, hands crossed  HEENT: nasal cannula in place  Respiratory: decreased breath sounds on LLB   Cardiovascular: RRR, no murmurs appreciated  Abdomen: soft, nontender to palpation  Extremities: b/l pitting edema up to knees, w/ pain on palpation b/l, moves all extremities spontaneously   Neurological: A&Ox0, notably confused however responding to commands.     MEDICATIONS:  MEDICATIONS  (STANDING):  ascorbic acid 500 milliGRAM(s) Oral daily  atorvastatin 80 milliGRAM(s) Oral at bedtime  carbidopa/levodopa  25/100 2.5 Tablet(s) Oral <User Schedule>  carbidopa/levodopa  25/100 2 Tablet(s) Oral <User Schedule>  chlorhexidine 4% Liquid 1 Application(s) Topical <User Schedule>  cholecalciferol 1000 Unit(s) Oral daily  dextrose 40% Gel 15 Gram(s) Oral once  dextrose 5%. 1000 milliLiter(s) (50 mL/Hr) IV Continuous <Continuous>  dextrose 5%. 1000 milliLiter(s) (100 mL/Hr) IV Continuous <Continuous>  dextrose 50% Injectable 25 Gram(s) IV Push once  dextrose 50% Injectable 12.5 Gram(s) IV Push once  dextrose 50% Injectable 25 Gram(s) IV Push once  folic acid 1 milliGRAM(s) Oral daily  glucagon  Injectable 1 milliGRAM(s) IntraMuscular once  insulin glargine Injectable (LANTUS) 6 Unit(s) SubCutaneous at bedtime  insulin lispro (ADMELOG) corrective regimen sliding scale   SubCutaneous every 6 hours  levothyroxine 25 MICROGram(s) Oral daily  metoprolol tartrate 25 milliGRAM(s) Oral two times a day  midodrine 10 milliGRAM(s) Oral every 8 hours  midodrine. 20 milliGRAM(s) Oral <User Schedule>  Nephro-celeste 1 Tablet(s) Oral daily  pantoprazole  Injectable 40 milliGRAM(s) IV Push daily  polyethylene glycol 3350 17 Gram(s) Oral two times a day  QUEtiapine 25 milliGRAM(s) Oral at bedtime  senna Syrup 10 milliLiter(s) Oral at bedtime  sevelamer carbonate Powder 1600 milliGRAM(s) Oral three times a day with meals  trihexyphenidyl 2 milliGRAM(s) Oral three times a day    MEDICATIONS  (PRN):  acetaminophen     Tablet .. 650 milliGRAM(s) Oral every 6 hours PRN Mild Pain (1 - 3)  albuterol/ipratropium for Nebulization 3 milliLiter(s) Nebulizer every 6 hours PRN Shortness of Breath and/or Wheezing  guaiFENesin Oral Liquid (Sugar-Free) 200 milliGRAM(s) Oral every 6 hours PRN Cough  oxyCODONE    IR 5 milliGRAM(s) Oral every 4 hours PRN Severe Pain (7 - 10)  sodium chloride 0.9% lock flush 10 milliLiter(s) IV Push every 1 hour PRN Pre/post blood products, medications, blood draw, and to maintain line patency      ALLERGIES:  Allergies    adhesives (Rash)  latex (Urticaria)  No Known Drug Allergies    Intolerances        LABS:                        7.7    15.00 )-----------( 162      ( 04 Nov 2021 00:30 )             25.1     11-04    135  |  96  |  38<H>  ----------------------------<  149<H>  4.0   |  22  |  3.81<H>    Ca    9.1      04 Nov 2021 00:30  Phos  3.9     11-04  Mg     2.2     11-04    TPro  7.2  /  Alb  2.7<L>  /  TBili  1.8<H>  /  DBili  x   /  AST  43<H>  /  ALT  8<L>  /  AlkPhos  141<H>  11-04    PT/INR - ( 04 Nov 2021 00:30 )   PT: 13.2 sec;   INR: 1.11 ratio         PTT - ( 04 Nov 2021 00:30 )  PTT:58.4 sec      RADIOLOGY & ADDITIONAL TESTS: Reviewed.

## 2021-11-04 NOTE — CHART NOTE - NSCHARTNOTEFT_GEN_A_CORE
MICU Transfer Note    Transfer from: MICU    Transfer to: (  ) Medicine    ( X ) Telemetry     (   ) RCU        (    ) Palliative         (   ) Stroke Unit          (   ) __________________    Accepting Physician:  Signout given to:     HPI:   70 yo M with PMH of T2DM, Parkinson's, Depression, CABG (2019), CAD s/p stents, ADHF, HTN, CKD admitted 10/21 for Afib/Aflutter RVR, NSTEMI managed on Heparin gtt and started on HD for acute on chronic CKD on 10/27. On 10/27 pt also developed worsening anemia as well as back pain, found to have b/l RP hematoma with active bleeding. Pt transferred to MICU for further management of hemorrhagic anemia.    MICU COURSE:  Pt underwent empiric embolization of left L4 and L5 lumbar arteries by IR on 10/28 although no definite extravasation noted. While in MICU he experienced delirium and confusion on precedex gtt and started on Seroquel. He was also started on empiric antibiotic treatment with vanc/zosyn (10/29-11/2) for leukocytosis, infectious w/u has been negative. He underwent dialysis c/b hypotension requiring vasopressors and started on midodrine and was able to tolerate HD with 1.4L fluid removed on 11/3.         ASSESSMENT & PLAN:             FOR FOLLOW UP: MICU Transfer Note    Transfer from: MICU    Transfer to: (  ) Medicine    ( X ) Telemetry     (   ) RCU        (    ) Palliative         (   ) Stroke Unit          (   ) __________________    Accepting Physician:  Signout given to:     HPI:   72 yo M with PMH of T2DM, Parkinson's, Depression, CABG (2019), CAD s/p stents, ADHF, HTN, CKD admitted 10/21 for Afib/Aflutter RVR, NSTEMI managed on Heparin gtt and started on HD for acute on chronic CKD on 10/27. On 10/27 pt also developed worsening anemia as well as back pain, found to have b/l RP hematoma with active bleeding. Pt transferred to MICU for further management of hemorrhagic anemia.    MICU COURSE:  Pt underwent empiric embolization of left L4 and L5 lumbar arteries by IR on 10/28 although no definite extravasation noted. He is s/p 8u PRBC and 2 FFP this MICU stay, his Hemoglobin has been stable over the past 24 hrs. While in MICU he experienced delirium and confusion on precedex gtt and started on Seroquel. He was also started on empiric antibiotic treatment with vanc/zosyn (10/29-11/2) for leukocytosis, infectious w/u has been negative. He underwent dialysis c/b hypotension requiring vasopressors and started on midodrine and was able to tolerate HD with 1.4L fluid removed on 11/3. He was found with persistently elevated aPTT and +Lupus anticoagulant with plan per Hemeon with supportive care, transfuse FFP if pending procedure/intervention and outpatient followup.       ASSESSMENT & PLAN:   70yo M w/ PMH of CAD (s/p CABG 2019), CKD (unknown stage), DM2, Parkinson's Disease, HTN, depression p/w b/l LE edema found to have ARF and NSTEMI w/ new Aflutter started on heparin gtt and HD. Course c/b RP bleed w/ anemia, admitted to the MICU for closer monitoring while on HD.    # Neuro:  //Delirium  - A&Ox1-AOx2  - Continue to monitor  - Continue seroquel  - Precedex stopped 11/1    // Parkinson's Dz  - Continue sinemet    # CV:  // CAD/ NSTEMI  - TTE mod to severe LVS dysfxn, hypokinesis anterior wall  - Holding heparin gtt and ASA given increasing RP bleed  - c/w Atorvastatin  - Unstable for cath at this moment  //Hypotension:  - Likely 2/2 hemorrhagic vs cardiogenic shock.  - Currently on midodrine 20 mg, MAP goal > 65  - Give midodrine 20 mg 30 mins prior to dialysis    // Aflutter  - c/w Lopressor 25mg BID  - Monitor for hypotension given RP bleed    # Pulm:  - On 2L NC  - Wean as tolerated  - B/L L >> R pleural effusions - however they are not affecting patient's respiratory status  - SpO2 > 92%  - Will not be pursuing thoracentesis at this time    # GI:  - No active issues  - pulled out NGT, f/u swallow evaluation  - Fluid Restrict to 1.5L    # Renal:  // ABBY on CKD  - s/p shiley placement w/ IR (placed 10/28)  - c/w Phoslo 1337 tid  - HD per nephro    # Endo  // DM2  - A1c 6.4%  - c/w Lantus 6U qHS, received half dose PM 11/3 given NPO status, continue to f/u Endocrinology reccs   - c/w ISS    // Hypothyroid  - c/w Synthroid 25mcg QD    # ID:  //Leukocytosis  - Abx stopped 11/2  - Was on vanc or zosyn for leukocytosis, however cultures have been negative.   - BC - no growth final    # Heme/Onc:  //Anemia likely 2/2 RP bleed  - S/p embolization by IR   - Holding AC i/s/o RP bleed  - Needed 1u pRBCs 11/2 for hgb 6.7, since elevated to 7.2. Currently stabilized to 7.1. ; s/p 8u PRBC and 2FFP this MICU stay  - Will continue to monitor patient's Hgb for stabilization  - Transfuse if hgb < 7.0     //Elevated PTT  - Persistently abnormal PTT despite heparin being held.   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  - Heme following; f/u reccs  - 11/2 - transfused 2u FFP, however PTT not corrected   - + Lupus anticoagulant, as per Heme continue supportive care and transfuse FFP PRN and prior to procedure/intervention, otherwise f/u as outpt    # Skin:  // No active issues    # Ethics   - FULL CODE           FOR FOLLOW UP:  [ ] f/u CBC and aPTT, monitor for signs of bleeding  [ ] + Lupus anticoagulant, as per Heme continue supportive care and monitor coags and transfuse FFP per aPTT and prior to procedure/intervention, otherwise f/u as outpt; f/u Hemeonc reccs  [ ] continue midodrine with HD MICU Transfer Note    Transfer from: MICU    Transfer to: (  ) Medicine    ( X ) Telemetry     (   ) RCU        (    ) Palliative         (   ) Stroke Unit          (   ) __________________    Accepting Physician:  Signout given to:     HPI:   72 yo M with PMH of T2DM, Parkinson's, Depression, CABG (2019), CAD s/p stents, ADHF, HTN, CKD admitted 10/21 for Afib/Aflutter RVR, NSTEMI managed on Heparin gtt and started on HD for acute on chronic CKD on 10/27. On 10/27 pt also developed worsening anemia as well as back pain, found to have b/l RP hematoma with active bleeding. Pt transferred to MICU for further management of hemorrhagic anemia.    MICU COURSE:  Pt underwent empiric embolization of left L4 and L5 lumbar arteries by IR on 10/28 although no definite extravasation noted. He is s/p 8u PRBC and 2 FFP this MICU stay, his Hemoglobin has been stable over the past 24 hrs. While in MICU he experienced delirium and confusion on precedex gtt and started on Seroquel. He was also started on empiric antibiotic treatment with vanc/zosyn (10/29-11/2) for leukocytosis, infectious w/u has been negative. He underwent dialysis c/b hypotension requiring vasopressors and started on midodrine and was able to tolerate HD with 1.4L fluid removed on 11/3. He was found with persistently elevated aPTT and +Lupus anticoagulant with plan per Hemeon with supportive care, transfuse FFP if pending procedure/intervention and outpatient followup.       ASSESSMENT & PLAN:   72yo M w/ PMH of CAD (s/p CABG 2019), CKD (unknown stage), DM2, Parkinson's Disease, HTN, depression p/w b/l LE edema found to have ARF and NSTEMI w/ new Aflutter started on heparin gtt and HD. Course c/b RP bleed w/ anemia, admitted to the MICU for closer monitoring while on HD.    # Neuro:  //Delirium  - A&Ox1-AOx2  - Continue to monitor  - Continue seroquel  - Precedex stopped 11/1    // Parkinson's Dz  - Continue sinemet    # CV:  // CAD/ NSTEMI  - TTE mod to severe LVS dysfxn, hypokinesis anterior wall  - Holding heparin gtt and ASA given increasing RP bleed  - c/w Atorvastatin  - Unstable for cath at this moment  //Hypotension:  - Likely 2/2 hemorrhagic vs cardiogenic shock.  - Currently on midodrine 20 mg, MAP goal > 65  - Give midodrine 20 mg 30 mins prior to dialysis    // Aflutter  - c/w Lopressor 25mg BID  - Monitor for hypotension given RP bleed    # Pulm:  - On 2L NC  - Wean as tolerated  - B/L L >> R pleural effusions - however they are not affecting patient's respiratory status  - SpO2 > 92%  - Will not be pursuing thoracentesis at this time    # GI:  - No active issues  - pulled out NGT, f/u swallow evaluation  - Fluid Restrict to 1.5L    # Renal:  // ABBY on CKD  - s/p shiley placement w/ IR (placed 10/28)  - c/w Phoslo 1337 tid  - HD per nephro    # Endo  // DM2  - A1c 6.4%  - c/w Lantus 6U qHS, received half dose PM 11/3 given NPO status, continue to f/u Endocrinology reccs   - c/w ISS    // Hypothyroid  - c/w Synthroid 25mcg QD    # ID:  //Leukocytosis  - Abx stopped 11/2  - Was on vanc or zosyn for leukocytosis, however cultures have been negative.   - BC - no growth final    # Heme/Onc:  //Anemia likely 2/2 RP bleed  - S/p embolization by IR   - Holding AC i/s/o RP bleed  - Needed 1u pRBCs 11/2 for hgb 6.7, since elevated to 7.2. Currently stabilized to 7.1. ; s/p 8u PRBC and 2FFP this MICU stay  - Will continue to monitor patient's Hgb for stabilization  - Transfuse if hgb < 7.0     //Elevated PTT  - Persistently abnormal PTT despite heparin being held.   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  - Heme following; f/u reccs  - 11/2 - transfused 2u FFP, however PTT not corrected   - + Lupus anticoagulant, as per Heme continue supportive care and transfuse FFP PRN and prior to procedure/intervention, otherwise f/u as outpt    # Skin:  // No active issues    # Ethics   - FULL CODE           FOR FOLLOW UP:  [ ] f/u CBC and aPTT, monitor for signs of bleeding and consider r/p CTA if further drop in Hgb   [ ] + Lupus anticoagulant, as per Heme continue supportive care and monitor coags and transfuse FFP per aPTT and prior to procedure/intervention, otherwise f/u as outpt; f/u Hemeonc reccs  [ ] continue midodrine with HD MICU Transfer Note    Transfer from: MICU    Transfer to: (  ) Medicine    ( X ) Telemetry     (   ) RCU        (    ) Palliative         (   ) Stroke Unit          (   ) __________________    Accepting Physician:  Signout given to:     HPI:   70 yo M with PMH of T2DM, Parkinson's, Depression, CABG (2019), CAD s/p stents, ADHF, HTN, CKD admitted 10/21 for Afib/Aflutter RVR, NSTEMI managed on Heparin gtt and started on HD for acute on chronic CKD on 10/27. On 10/27 pt also developed worsening anemia as well as back pain, found to have b/l RP hematoma with active bleeding. Pt transferred to MICU for further management of hemorrhagic anemia.    MICU COURSE:  Pt underwent empiric embolization of left L4 and L5 lumbar arteries by IR on 10/28 although no definite extravasation noted. He is s/p 8u PRBC and 2 FFP this MICU stay, his Hemoglobin has been stable over the past 24 hrs. While in MICU he experienced delirium and confusion on precedex gtt and started on Seroquel. He was also started on empiric antibiotic treatment with vanc/zosyn (10/29-11/2) for leukocytosis, infectious w/u has been negative. He underwent dialysis c/b hypotension requiring vasopressors and started on midodrine and was able to tolerate HD with 1.4L fluid removed on 11/3. He was found with persistently elevated aPTT and +Lupus anticoagulant with plan per Hemeon with supportive care, transfuse FFP if pending procedure/intervention and outpatient followup.       ASSESSMENT & PLAN:   70yo M w/ PMH of CAD (s/p CABG 2019), CKD (unknown stage), DM2, Parkinson's Disease, HTN, depression p/w b/l LE edema found to have ARF and NSTEMI w/ new Aflutter started on heparin gtt and HD. Course c/b RP bleed w/ anemia, admitted to the MICU for closer monitoring while on HD.    # Neuro:  //Delirium  - A&Ox1-AOx2  - Continue to monitor  - Continue seroquel  - Precedex stopped 11/1    // Parkinson's Dz  - Continue sinemet    # CV:  // CAD/ NSTEMI  - TTE mod to severe LVS dysfxn, hypokinesis anterior wall  - Holding heparin gtt and ASA given increasing RP bleed  - c/w Atorvastatin  - Unstable for cath at this moment  //Hypotension:  - Likely 2/2 hemorrhagic vs cardiogenic shock.  - Off standing midodrine  - Midodrine 20 mg 30 mins before dialysis - patient has significant drop in BP during HD    // Aflutter  - c/w Lopressor 25mg BID  - Monitor for hypotension given RP bleed    # Pulm:  - On 2L NC  - Wean as tolerated  - B/L L >> R pleural effusions - however they are not affecting patient's respiratory status  - SpO2 > 92%  - Will not be pursuing thoracentesis at this time    # GI:  - No active issues  - pulled out NGT, f/u swallow evaluation  - Fluid Restrict to 1.5L    # Renal:  // ESRD  - s/p shiley placement w/ IR (placed 10/28)  - c/w Phoslo 1337 tid  - HD per nephro (M,W,F)    # Endo  // DM2  - A1c 6.4%  - c/w Lantus 6U qHS, received half dose PM 11/3 given NPO status, continue to f/u Endocrinology reccs   - c/w ISS    // Hypothyroid  - c/w Synthroid 25mcg QD    # ID:  //Leukocytosis  - Abx stopped 11/2  - Was on vanc or zosyn for leukocytosis, however cultures have been negative.   - BC - no growth final    # Heme/Onc:  //Anemia likely 2/2 RP bleed  - S/p embolization by IR   - Holding AC i/s/o RP bleed  - Needed 1u pRBCs 11/2 for hgb 6.7, since elevated to 7.2. Currently stabilized to 7.1. ; s/p 8u PRBC and 2FFP this MICU stay  - Will continue to monitor patient's Hgb for stabilization  - Transfuse if hgb < 7.0     //Elevated PTT  - Persistently abnormal PTT despite heparin being held.   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  - Heme following; f/u reccs  - 11/2 - transfused 2u FFP, however PTT not corrected   - + Lupus anticoagulant, as per Heme continue supportive care and transfuse FFP PRN and prior to procedure/intervention, otherwise f/u as outpt    # Skin:  // No active issues    # Ethics   - FULL CODE           FOR FOLLOW UP:  [ ] f/u CBC and aPTT, monitor for signs of bleeding and consider r/p CTA if further drop in Hgb   [ ] + Lupus anticoagulant, as per Heme continue supportive care and monitor coags and transfuse FFP per aPTT and prior to procedure/intervention, otherwise f/u as outpt; f/u Hemeonc reccs  [ ] continue midodrine with HD MICU Transfer Note    Transfer from: MICU    Transfer to: (  ) Medicine    ( X ) Telemetry     (   ) RCU        (    ) Palliative         (   ) Stroke Unit          (   ) __________________    Accepting Physician:  Signout given to:     HPI:   70 yo M with PMH of T2DM, Parkinson's, Depression, CABG (2019), CAD s/p stents, ADHF, HTN, CKD admitted 10/21 for Afib/Aflutter RVR, NSTEMI managed on Heparin gtt and started on HD for acute on chronic CKD on 10/27. On 10/27 pt also developed worsening anemia as well as back pain, found to have b/l RP hematoma with active bleeding. Pt transferred to MICU for further management of hemorrhagic anemia.    MICU COURSE:  Pt underwent empiric embolization of left L4 and L5 lumbar arteries by IR on 10/28 although no definite extravasation noted. He is s/p 8u PRBC and 2 FFP this MICU stay, his Hemoglobin has been stable over the past 24 hrs. While in MICU he experienced delirium and confusion on precedex gtt and started on Seroquel. He was also started on empiric antibiotic treatment with vanc/zosyn (10/29-11/2) for leukocytosis, infectious w/u has been negative. He underwent dialysis c/b hypotension requiring vasopressors and started on midodrine and was able to tolerate HD with 1.4L fluid removed on 11/3. He was found with persistently elevated aPTT and +Lupus anticoagulant with plan per Hemeon with supportive care, transfuse FFP if pending procedure/intervention and outpatient followup.       ASSESSMENT & PLAN:   70yo M w/ PMH of CAD (s/p CABG 2019), CKD (unknown stage), DM2, Parkinson's Disease, HTN, depression p/w b/l LE edema found to have ARF and NSTEMI w/ new Aflutter started on heparin gtt and HD. Course c/b RP bleed w/ anemia, admitted to the MICU for closer monitoring while on HD.    # Neuro:  //Delirium  - A&Ox1-AOx2  - Continue to monitor  - Continue seroquel  - Precedex stopped 11/1    // Parkinson's Dz  - Continue sinemet    # CV:  // CAD/ NSTEMI  - TTE mod to severe LVS dysfxn, hypokinesis anterior wall  - Holding heparin gtt and ASA given increasing RP bleed  - c/w Atorvastatin  - Unstable for cath at this moment  //Hypotension:  - Likely 2/2 hemorrhagic vs cardiogenic shock.  - Off standing midodrine  - Midodrine 20 mg 30 mins before dialysis - patient has significant drop in BP during HD    // Aflutter  - c/w Lopressor 25mg BID  - Monitor for hypotension given RP bleed    # Pulm:  - On 2L NC  - Wean as tolerated  - B/L L >> R pleural effusions - however they are not affecting patient's respiratory status  - SpO2 > 92%  - Will not be pursuing thoracentesis at this time    # GI:  - No active issues  - S/p swallow eval, no dysphagia, currently on soft diet    # Renal:  // ESRD  - s/p shiley placement w/ IR (placed 10/28)  - c/w Phoslo 1337 tid  - HD per nephro (M,W,F)    # Endo  // DM2  - A1c 6.4%  - c/w Lantus 6U qHS, received half dose PM 11/3 given NPO status, continue to f/u Endocrinology reccs   - c/w ISS    // Hypothyroid  - c/w Synthroid 25mcg QD    # ID:  //Leukocytosis  - Abx stopped 11/2  - Was on vanc or zosyn for leukocytosis, however cultures have been negative.   - BC - no growth final    # Heme/Onc:  //Anemia likely 2/2 RP bleed  - S/p embolization by IR   - Holding AC i/s/o RP bleed  - Needed 1u pRBCs 11/2 for hgb 6.7, since elevated to 7.2. Currently stabilized to 7.1. ; s/p 8u PRBC and 2FFP this MICU stay  - Will continue to monitor patient's Hgb for stabilization  - Transfuse if hgb < 7.0     //Elevated PTT  - Persistently abnormal PTT despite heparin being held.   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  - Heme following; f/u reccs  - 11/2 - transfused 2u FFP, however PTT not corrected   - + Lupus anticoagulant, as per Heme continue supportive care and transfuse FFP PRN and prior to procedure/intervention, otherwise f/u as outpt    # Skin:  // No active issues    # Ethics   - FULL CODE       FOR FOLLOW UP:  [ ] f/u CBC and aPTT, monitor for signs of bleeding and consider r/p CTA if further drop in Hgb   [ ] + Lupus anticoagulant, as per Heme continue supportive care and monitor coags and transfuse FFP per aPTT and prior to procedure/intervention, otherwise f/u as outpt; f/u Hemeonc reccs  [ ] continue midodrine with HD MICU Transfer Note    Transfer from: MICU    Transfer to: (  ) Medicine    ( X ) Telemetry     (   ) RCU        (    ) Palliative         (   ) Stroke Unit          (   ) __________________    Accepting Physician: Miriam  Signout given to: Miriam    HPI:   72 yo M with PMH of T2DM, Parkinson's, Depression, CABG (2019), CAD s/p stents, ADHF, HTN, CKD admitted 10/21 for Afib/Aflutter RVR, NSTEMI managed on Heparin gtt and started on HD for acute on chronic CKD on 10/27. On 10/27 pt also developed worsening anemia as well as back pain, found to have b/l RP hematoma with active bleeding. Pt transferred to MICU for further management of hemorrhagic anemia.    MICU COURSE:  Pt underwent empiric embolization of left L4 and L5 lumbar arteries by IR on 10/28 although no definite extravasation noted. He is s/p 8u PRBC and 2 FFP this MICU stay, his Hemoglobin has been stable over the past 24 hrs. While in MICU he experienced delirium and confusion on precedex gtt and started on Seroquel. He was also started on empiric antibiotic treatment with vanc/zosyn (10/29-11/2) for leukocytosis, infectious w/u has been negative. He underwent dialysis c/b hypotension requiring vasopressors and started on midodrine and was able to tolerate HD with 1.4L fluid removed on 11/3. He was found with persistently elevated aPTT and +Lupus anticoagulant with plan per Hemeon with supportive care, transfuse FFP if pending procedure/intervention and outpatient followup.       ASSESSMENT & PLAN:   70yo M w/ PMH of CAD (s/p CABG 2019), CKD (unknown stage), DM2, Parkinson's Disease, HTN, depression p/w b/l LE edema found to have ARF and NSTEMI w/ new Aflutter started on heparin gtt and HD. Course c/b RP bleed w/ anemia, admitted to the MICU for closer monitoring while on HD.    # Neuro:  //Delirium  - A&Ox1-AOx2  - Continue to monitor  - Continue seroquel  - Precedex stopped 11/1    // Parkinson's Dz  - Continue sinemet    # CV:  // CAD/ NSTEMI  - TTE mod to severe LVS dysfxn, hypokinesis anterior wall  - Holding heparin gtt and ASA given increasing RP bleed  - c/w Atorvastatin  - Unstable for cath at this moment  //Hypotension:  - Likely 2/2 hemorrhagic vs cardiogenic shock.  - Off standing midodrine  - Midodrine 20 mg 30 mins before dialysis - patient has significant drop in BP during HD    // Aflutter  - c/w Lopressor 25mg BID  - Monitor for hypotension given RP bleed    # Pulm:  - On 2L NC  - Wean as tolerated  - B/L L >> R pleural effusions - however they are not affecting patient's respiratory status  - SpO2 > 92%  - Will not be pursuing thoracentesis at this time    # GI:  - No active issues  - S/p swallow eval, no dysphagia, currently on soft diet    # Renal:  // ESRD  - s/p shiley placement w/ IR (placed 10/28)  - c/w Phoslo 1337 tid  - HD per nephro (M,W,F)    # Endo  // DM2  - A1c 6.4%  - c/w Lantus 6U qHS, received half dose PM 11/3 given NPO status, continue to f/u Endocrinology reccs   - c/w ISS    // Hypothyroid  - c/w Synthroid 25mcg QD    # ID:  //Leukocytosis  - Abx stopped 11/2  - Was on vanc or zosyn for leukocytosis, however cultures have been negative.   - BC - no growth final    # Heme/Onc:  //Anemia likely 2/2 RP bleed  - S/p embolization by IR   - Holding AC i/s/o RP bleed  - Needed 1u pRBCs 11/2 for hgb 6.7, since elevated to 7.2. Currently stabilized to 7.1. ; s/p 8u PRBC and 2FFP this MICU stay  - Will continue to monitor patient's Hgb for stabilization  - Transfuse if hgb < 7.0     //Elevated PTT  - Persistently abnormal PTT despite heparin being held.   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  - Heme following; f/u reccs  - 11/2 - transfused 2u FFP, however PTT not corrected   - + Lupus anticoagulant, as per Heme continue supportive care and transfuse FFP PRN and prior to procedure/intervention, otherwise f/u as outpt    # Skin:  // No active issues    # Ethics   - FULL CODE       FOR FOLLOW UP:  [ ] f/u CBC and aPTT, monitor for signs of bleeding and consider r/p CTA if further drop in Hgb   [ ] + Lupus anticoagulant, as per Heme continue supportive care and monitor coags and transfuse FFP per aPTT and prior to procedure/intervention, otherwise f/u as outpt; f/u Hemeonc reccs  [ ] continue midodrine with HD MICU Transfer Note    Transfer from: MICU    Transfer to: (  ) Medicine    ( X ) Telemetry     (   ) RCU        (    ) Palliative         (   ) Stroke Unit          (   ) __________________    Accepting Physician: Miriam  Signout given to: Miriam    HPI:   70 yo M with PMH of T2DM, Parkinson's, Depression, CABG (2019), CAD s/p stents, ADHF, HTN, CKD admitted 10/21 for Afib/Aflutter RVR, NSTEMI managed on Heparin gtt and started on HD for acute on chronic CKD on 10/27. On 10/27 pt also developed worsening anemia as well as back pain, found to have b/l RP hematoma with active bleeding. Pt transferred to MICU for further management of hemorrhagic anemia.    MICU COURSE:  Pt underwent empiric embolization of left L4 and L5 lumbar arteries by IR on 10/28 although no definite extravasation noted. He is s/p 8u PRBC and 2 FFP this MICU stay, his Hemoglobin has been stable over the past 24 hrs. While in MICU he experienced delirium and confusion on precedex gtt and started on Seroquel. He was also started on empiric antibiotic treatment with vanc/zosyn (10/29-11/2) for leukocytosis, infectious w/u has been negative. He underwent dialysis c/b hypotension requiring vasopressors and started on midodrine and was able to tolerate HD with 1.4L fluid removed on 11/3. He was found with persistently elevated aPTT and +Lupus anticoagulant with plan per Hemeon with supportive care, transfuse FFP if pending procedure/intervention and outpatient followup.       ASSESSMENT & PLAN:   72yo M w/ PMH of CAD (s/p CABG 2019), CKD (unknown stage), DM2, Parkinson's Disease, HTN, depression p/w b/l LE edema found to have ARF and NSTEMI w/ new Aflutter started on heparin gtt and HD. Course c/b RP bleed w/ anemia, admitted to the MICU for closer monitoring while on HD.    # Neuro:  //Delirium  - A&Ox1-AOx2  - Continue to monitor  - Continue seroquel  - Precedex stopped 11/1    // Parkinson's Dz  - Continue sinemet    # CV:  // CAD/ NSTEMI  - TTE mod to severe LVS dysfxn, hypokinesis anterior wall  - Holding heparin gtt and ASA given increasing RP bleed  - c/w Atorvastatin  - Unstable for cath at this moment  //Hypotension:  - Likely 2/2 hemorrhagic vs cardiogenic shock.  - Off standing midodrine  - Midodrine 20 mg 30 mins before dialysis - patient has significant drop in BP during HD    // Aflutter  - c/w Lopressor 25mg BID  - Monitor for hypotension given RP bleed    # Pulm:  - On 2L NC  - Wean as tolerated  - B/L L >> R pleural effusions - however they are not affecting patient's respiratory status  - SpO2 > 92%  - Will not be pursuing thoracentesis at this time    # GI:  - No active issues  - S/p swallow eval, no dysphagia, currently on soft diet    # Renal:  // ESRD  - s/p shiley placement w/ IR (placed 10/28)  - c/w Phoslo 1337 tid  - HD per nephro (M,W,F)    # Endo  // DM2  - A1c 6.4%  - c/w Lantus 6U qHS, received half dose PM 11/3 given NPO status, continue to f/u Endocrinology reccs   - c/w ISS    // Hypothyroid  - c/w Synthroid 25mcg QD    # ID:  //Leukocytosis  - Abx stopped 11/2  - Was on vanc or zosyn for leukocytosis, however cultures have been negative.   - BC - no growth final    # Heme/Onc:  //Anemia likely 2/2 RP bleed  - S/p embolization by IR   - Holding AC i/s/o RP bleed  - Needed 1u pRBCs 11/2 for hgb 6.7, since elevated to 7.2. Currently stabilized to 7.1. ; s/p 8u PRBC and 2FFP this MICU stay  - Will continue to monitor patient's Hgb for stabilization  - Transfuse if hgb < 7.0     //Elevated PTT  - Persistently abnormal PTT despite heparin being held.   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  - Heme following; f/u reccs  - 11/2 - transfused 2u FFP, however PTT not corrected   - + Lupus anticoagulant, as per Heme continue supportive care and transfuse FFP PRN and prior to procedure/intervention, otherwise f/u as outpt    # Skin:  // No active issues    # Ethics   - FULL CODE       FOR FOLLOW UP:  [ ] f/u CBC and aPTT, monitor for signs of bleeding and consider r/p CTA if further drop in Hgb   [ ] + Lupus anticoagulant, as per Heme continue supportive care and monitor coags and transfuse FFP per aPTT and prior to procedure/intervention, otherwise f/u as outpt; f/u Hemeonc reccs  [ ] continue midodrine with HD  [ ] F/u mental status - patient still has some ICU delirium, currently on seroquel qhs. Patient's baseline is A&Ox3.

## 2021-11-04 NOTE — PROGRESS NOTE ADULT - SUBJECTIVE AND OBJECTIVE BOX
Chief complaint  Patient is a 71y old  Male who presents with a chief complaint of leg swelling (04 Nov 2021 09:40)   Review of systems  Patient in chair, NAD, no hypoglycemic episodes.    Labs and Fingersticks  CAPILLARY BLOOD GLUCOSE      POCT Blood Glucose.: 136 mg/dL (04 Nov 2021 11:36)  POCT Blood Glucose.: 137 mg/dL (04 Nov 2021 06:10)  POCT Blood Glucose.: 122 mg/dL (03 Nov 2021 22:25)  POCT Blood Glucose.: 131 mg/dL (03 Nov 2021 18:34)      Anion Gap, Serum: 17 (11-04 @ 00:30)  Anion Gap, Serum: 16 (11-03 @ 01:48)  Anion Gap, Serum: 15 (11-03 @ 01:18)  Anion Gap, Serum: 12 (11-03 @ 00:25)      Calcium, Total Serum: 9.1 (11-04 @ 00:30)  Calcium, Total Serum: 9.1 (11-03 @ 01:48)  Calcium, Total Serum: 9.3 (11-03 @ 01:18)  Calcium, Total Serum: 8.7 (11-03 @ 00:25)  Albumin, Serum: 2.7 *L* (11-04 @ 00:30)  Albumin, Serum: 2.6 *L* (11-03 @ 01:48)  Albumin, Serum: 2.4 *L* (11-03 @ 01:18)  Albumin, Serum: 2.6 *L* (11-03 @ 00:25)    Alanine Aminotransferase (ALT/SGPT): 8 *L* (11-04 @ 00:30)  Alanine Aminotransferase (ALT/SGPT): 6 *L* (11-03 @ 01:48)  Alanine Aminotransferase (ALT/SGPT): 8 *L* (11-03 @ 01:18)  Alanine Aminotransferase (ALT/SGPT): 10 (11-03 @ 00:25)  Alkaline Phosphatase, Serum: 141 *H* (11-04 @ 00:30)  Alkaline Phosphatase, Serum: 136 *H* (11-03 @ 01:48)  Alkaline Phosphatase, Serum: 129 *H* (11-03 @ 01:18)  Alkaline Phosphatase, Serum: 120 (11-03 @ 00:25)  Aspartate Aminotransferase (AST/SGOT): 43 *H* (11-04 @ 00:30)  Aspartate Aminotransferase (AST/SGOT): 35 (11-03 @ 01:48)  Aspartate Aminotransferase (AST/SGOT): 50 *H* (11-03 @ 01:18)  Aspartate Aminotransferase (AST/SGOT): 94 *H* (11-03 @ 00:25)        11-04    135  |  96  |  38<H>  ----------------------------<  149<H>  4.0   |  22  |  3.81<H>    Ca    9.1      04 Nov 2021 00:30  Phos  3.9     11-04  Mg     2.2     11-04    TPro  7.2  /  Alb  2.7<L>  /  TBili  1.8<H>  /  DBili  x   /  AST  43<H>  /  ALT  8<L>  /  AlkPhos  141<H>  11-04                        7.7    15.00 )-----------( 162      ( 04 Nov 2021 00:30 )             25.1     Medications  MEDICATIONS  (STANDING):  ascorbic acid 500 milliGRAM(s) Oral daily  atorvastatin 80 milliGRAM(s) Oral at bedtime  carbidopa/levodopa  25/100 2.5 Tablet(s) Oral <User Schedule>  carbidopa/levodopa  25/100 2 Tablet(s) Oral <User Schedule>  chlorhexidine 4% Liquid 1 Application(s) Topical <User Schedule>  cholecalciferol 1000 Unit(s) Oral daily  dextrose 40% Gel 15 Gram(s) Oral once  dextrose 5%. 1000 milliLiter(s) (50 mL/Hr) IV Continuous <Continuous>  dextrose 5%. 1000 milliLiter(s) (100 mL/Hr) IV Continuous <Continuous>  dextrose 50% Injectable 25 Gram(s) IV Push once  dextrose 50% Injectable 12.5 Gram(s) IV Push once  dextrose 50% Injectable 25 Gram(s) IV Push once  folic acid 1 milliGRAM(s) Oral daily  glucagon  Injectable 1 milliGRAM(s) IntraMuscular once  insulin glargine Injectable (LANTUS) 6 Unit(s) SubCutaneous at bedtime  insulin lispro (ADMELOG) corrective regimen sliding scale   SubCutaneous every 6 hours  levothyroxine 25 MICROGram(s) Oral daily  metoprolol tartrate 25 milliGRAM(s) Oral two times a day  midodrine 20 milliGRAM(s) Oral <User Schedule>  Nephro-celeste 1 Tablet(s) Oral daily  pantoprazole  Injectable 40 milliGRAM(s) IV Push daily  polyethylene glycol 3350 17 Gram(s) Oral two times a day  QUEtiapine 25 milliGRAM(s) Oral at bedtime  senna Syrup 10 milliLiter(s) Oral at bedtime  sevelamer carbonate Powder 1600 milliGRAM(s) Oral three times a day with meals  trihexyphenidyl 2 milliGRAM(s) Oral three times a day      Physical Exam  General: Patient comfortable in chair  Vital Signs Last 12 Hrs  T(F): 103.5 (11-04-21 @ 08:00), Max: 103.5 (11-04-21 @ 08:00)  HR: 82 (11-04-21 @ 14:00) (60 - 113)  BP: 121/56 (11-04-21 @ 14:00) (115/67 - 161/89)  BP(mean): 81 (11-04-21 @ 14:00) (81 - 114)  RR: 14 (11-04-21 @ 14:00) (14 - 27)  SpO2: 95% (11-04-21 @ 14:00) (89% - 100%)      Diagnostics    Free Thyroxine, Serum: AM Sched. Collection: 26-Oct-2021 06:00 (10-25 @ 11:45)  Thyroid Stimulating Hormone, Serum: AM Sched. Collection: 26-Oct-2021 06:00 (10-25 @ 11:45)  Cortisol AM, Serum: 08:00 (10-22 @ 12:47)  Free Thyroxine, Serum: AM Sched. Collection: 23-Oct-2021 06:00 (10-22 @ 12:43)  Thyroid Stimulating Hormone, Serum: AM Sched. Collection: 23-Oct-2021 06:00 (10-22 @ 12:43)               Chief complaint  Patient is a 71y old  Male who presents with a chief complaint of leg swelling (04 Nov 2021 09:40)   Review of systems  Patient in chair, NAD, no hypoglycemic episodes.    Labs and Fingersticks  CAPILLARY BLOOD GLUCOSE      POCT Blood Glucose.: 136 mg/dL (04 Nov 2021 11:36)  POCT Blood Glucose.: 137 mg/dL (04 Nov 2021 06:10)  POCT Blood Glucose.: 122 mg/dL (03 Nov 2021 22:25)  POCT Blood Glucose.: 131 mg/dL (03 Nov 2021 18:34)      Anion Gap, Serum: 17 (11-04 @ 00:30)  Anion Gap, Serum: 16 (11-03 @ 01:48)  Anion Gap, Serum: 15 (11-03 @ 01:18)  Anion Gap, Serum: 12 (11-03 @ 00:25)      Calcium, Total Serum: 9.1 (11-04 @ 00:30)  Calcium, Total Serum: 9.1 (11-03 @ 01:48)  Calcium, Total Serum: 9.3 (11-03 @ 01:18)  Calcium, Total Serum: 8.7 (11-03 @ 00:25)  Albumin, Serum: 2.7 *L* (11-04 @ 00:30)  Albumin, Serum: 2.6 *L* (11-03 @ 01:48)  Albumin, Serum: 2.4 *L* (11-03 @ 01:18)  Albumin, Serum: 2.6 *L* (11-03 @ 00:25)    Alanine Aminotransferase (ALT/SGPT): 8 *L* (11-04 @ 00:30)  Alanine Aminotransferase (ALT/SGPT): 6 *L* (11-03 @ 01:48)  Alanine Aminotransferase (ALT/SGPT): 8 *L* (11-03 @ 01:18)  Alanine Aminotransferase (ALT/SGPT): 10 (11-03 @ 00:25)  Alkaline Phosphatase, Serum: 141 *H* (11-04 @ 00:30)  Alkaline Phosphatase, Serum: 136 *H* (11-03 @ 01:48)  Alkaline Phosphatase, Serum: 129 *H* (11-03 @ 01:18)  Alkaline Phosphatase, Serum: 120 (11-03 @ 00:25)  Aspartate Aminotransferase (AST/SGOT): 43 *H* (11-04 @ 00:30)  Aspartate Aminotransferase (AST/SGOT): 35 (11-03 @ 01:48)  Aspartate Aminotransferase (AST/SGOT): 50 *H* (11-03 @ 01:18)  Aspartate Aminotransferase (AST/SGOT): 94 *H* (11-03 @ 00:25)        11-04    135  |  96  |  38<H>  ----------------------------<  149<H>  4.0   |  22  |  3.81<H>    Ca    9.1      04 Nov 2021 00:30  Phos  3.9     11-04  Mg     2.2     11-04    TPro  7.2  /  Alb  2.7<L>  /  TBili  1.8<H>  /  DBili  x   /  AST  43<H>  /  ALT  8<L>  /  AlkPhos  141<H>  11-04                        7.7    15.00 )-----------( 162      ( 04 Nov 2021 00:30 )             25.1     Medications  MEDICATIONS  (STANDING):  ascorbic acid 500 milliGRAM(s) Oral daily  atorvastatin 80 milliGRAM(s) Oral at bedtime  carbidopa/levodopa  25/100 2.5 Tablet(s) Oral <User Schedule>  carbidopa/levodopa  25/100 2 Tablet(s) Oral <User Schedule>  chlorhexidine 4% Liquid 1 Application(s) Topical <User Schedule>  cholecalciferol 1000 Unit(s) Oral daily  dextrose 40% Gel 15 Gram(s) Oral once  dextrose 5%. 1000 milliLiter(s) (50 mL/Hr) IV Continuous <Continuous>  dextrose 5%. 1000 milliLiter(s) (100 mL/Hr) IV Continuous <Continuous>  dextrose 50% Injectable 25 Gram(s) IV Push once  dextrose 50% Injectable 12.5 Gram(s) IV Push once  dextrose 50% Injectable 25 Gram(s) IV Push once  folic acid 1 milliGRAM(s) Oral daily  glucagon  Injectable 1 milliGRAM(s) IntraMuscular once  insulin glargine Injectable (LANTUS) 6 Unit(s) SubCutaneous at bedtime  insulin lispro (ADMELOG) corrective regimen sliding scale   SubCutaneous every 6 hours  levothyroxine 25 MICROGram(s) Oral daily  metoprolol tartrate 25 milliGRAM(s) Oral two times a day  midodrine 20 milliGRAM(s) Oral <User Schedule>  Nephro-celeste 1 Tablet(s) Oral daily  pantoprazole  Injectable 40 milliGRAM(s) IV Push daily  polyethylene glycol 3350 17 Gram(s) Oral two times a day  QUEtiapine 25 milliGRAM(s) Oral at bedtime  senna Syrup 10 milliLiter(s) Oral at bedtime  sevelamer carbonate Powder 1600 milliGRAM(s) Oral three times a day with meals  trihexyphenidyl 2 milliGRAM(s) Oral three times a day      Physical Exam  General: Patient comfortable in chair  Vital Signs Last 12 Hrs  T(F): 103.5 (11-04-21 @ 08:00), Max: 103.5 (11-04-21 @ 08:00)  HR: 82 (11-04-21 @ 14:00) (60 - 113)  BP: 121/56 (11-04-21 @ 14:00) (115/67 - 161/89)  BP(mean): 81 (11-04-21 @ 14:00) (81 - 114)  RR: 14 (11-04-21 @ 14:00) (14 - 27)  SpO2: 95% (11-04-21 @ 14:00) (89% - 100%)      Diagnostics    Free Thyroxine, Serum: AM Sched. Collection: 26-Oct-2021 06:00 (10-25 @ 11:45)  Thyroid Stimulating Hormone, Serum: AM Sched. Collection: 26-Oct-2021 06:00 (10-25 @ 11:45)  Cortisol AM, Serum: 08:00 (10-22 @ 12:47)  Free Thyroxine, Serum: AM Sched. Collection: 23-Oct-2021 06:00 (10-22 @ 12:43)  Thyroid Stimulating Hormone, Serum: AM Sched. Collection: 23-Oct-2021 06:00 (10-22 @ 12:43)

## 2021-11-04 NOTE — PROGRESS NOTE ADULT - ASSESSMENT
ABBY now on HD  Acute on chronic systolic CHF with fluid overload  Bilateral L>>R pleural effusions  S/P RP bleed on AC   Atrial Flutter/Fib    REC    Transfuse RBC prn per MICU team  Monitor Hb  HD per renal

## 2021-11-04 NOTE — PROGRESS NOTE ADULT - SUBJECTIVE AND OBJECTIVE BOX
[facility] [phone]        Patient seen and examined in bed.  He was about the same.  Confused earlier   Aflutter at present   Febrile         MEDICATIONS  (STANDING):  ascorbic acid 500 milliGRAM(s) Oral daily  atorvastatin 80 milliGRAM(s) Oral at bedtime  carbidopa/levodopa  25/100 2.5 Tablet(s) Oral <User Schedule>  carbidopa/levodopa  25/100 2 Tablet(s) Oral <User Schedule>  chlorhexidine 4% Liquid 1 Application(s) Topical <User Schedule>  cholecalciferol 1000 Unit(s) Oral daily  dextrose 40% Gel 15 Gram(s) Oral once  dextrose 5%. 1000 milliLiter(s) (50 mL/Hr) IV Continuous <Continuous>  dextrose 5%. 1000 milliLiter(s) (100 mL/Hr) IV Continuous <Continuous>  dextrose 50% Injectable 25 Gram(s) IV Push once  dextrose 50% Injectable 12.5 Gram(s) IV Push once  dextrose 50% Injectable 25 Gram(s) IV Push once  folic acid 1 milliGRAM(s) Oral daily  glucagon  Injectable 1 milliGRAM(s) IntraMuscular once  insulin glargine Injectable (LANTUS) 6 Unit(s) SubCutaneous at bedtime  insulin lispro (ADMELOG) corrective regimen sliding scale   SubCutaneous every 6 hours  levothyroxine 25 MICROGram(s) Oral daily  metoprolol tartrate 25 milliGRAM(s) Oral two times a day  midodrine 10 milliGRAM(s) Oral every 8 hours  midodrine. 20 milliGRAM(s) Oral <User Schedule>  Nephro-celeste 1 Tablet(s) Oral daily  pantoprazole  Injectable 40 milliGRAM(s) IV Push daily  polyethylene glycol 3350 17 Gram(s) Oral two times a day  QUEtiapine 25 milliGRAM(s) Oral at bedtime  senna Syrup 10 milliLiter(s) Oral at bedtime  sevelamer carbonate Powder 1600 milliGRAM(s) Oral three times a day with meals  trihexyphenidyl 2 milliGRAM(s) Oral three times a day      VITAL:  T(C): , Max: 39.7 (11-04-21 @ 08:00)  T(F): , Max: 103.5 (11-04-21 @ 08:00)  HR: 60 (11-04-21 @ 09:00)  BP: 130/60 (11-04-21 @ 09:00)  BP(mean): 87 (11-04-21 @ 09:00)  RR: 27 (11-04-21 @ 07:00)  SpO2: 100% (11-04-21 @ 09:00)  Wt(kg): --    I and O's:    11-03 @ 07:01  -  11-04 @ 07:00  --------------------------------------------------------  IN: 10 mL / OUT: 1425 mL / NET: -1415 mL          PHYSICAL EXAM:    Constitutional: NAD  Neck:  No JVD  Respiratory: CTAB/L  Cardiovascular: S1 and S2  Gastrointestinal: BS+, soft, NT/ND  Extremities: No peripheral edema  Neurological: A/O x 3, no focal deficits  Psychiatric: Normal mood, normal affect  : No Javier  Skin: No rashes  Access: [facility]     LABS:                        7.7    15.00 )-----------( 162      ( 04 Nov 2021 00:30 )             25.1     11-04    135  |  96  |  38<H>  ----------------------------<  149<H>  4.0   |  22  |  3.81<H>    Ca    9.1      04 Nov 2021 00:30  Phos  3.9     11-04  Mg     2.2     11-04    TPro  7.2  /  Alb  2.7<L>  /  TBili  1.8<H>  /  DBili  x   /  AST  43<H>  /  ALT  8<L>  /  AlkPhos  141<H>  11-04          Urine Studies:          RADIOLOGY & ADDITIONAL STUDIES:

## 2021-11-04 NOTE — SWALLOW BEDSIDE ASSESSMENT ADULT - SLP PERTINENT HISTORY OF CURRENT PROBLEM
70 yo M with PMH of T2DM, Parkinson's, Depression, CABG (2019), CAD s/p stents, ADHF, HTN, CKD admitted 10/21 for Afib/Aflutter RVR, NSTEMI managed on Heparin gtt and started on HD for acute on chronic CKD on 10/27. On 10/27 pt also developed worsening anemia as well as back pain, found to have b/l RP hematoma with active bleeding. Pt transferred to MICU for further management of hemorrhagic anemia.

## 2021-11-04 NOTE — SWALLOW BEDSIDE ASSESSMENT ADULT - SWALLOW EVAL: DIAGNOSIS
70yo M w/ PMH of CAD (s/p CABG 2019), CKD (unknown stage), DM2, Parkinson's Disease, HTN, depression p/w b/l LE edema found to have ARF and NSTEMI w/ new Aflutter started on heparin gtt and HD. Course c/b RP bleed w/ anemia, admitted to the MICU for closer monitoring while on HD. Patient presents with grossly functional oropharyngeal swallow on bedside exam. Oral management is adequate for all textures. There are no overt signs of laryngeal penetration/aspiration. Patient initially npo/ngt per MD due to delirium. Pt is now AA+Ox1 to person, still confused (reoriented to place; poor recall for situation and temporal info), although adequately oriented to feeding process. Mild tremor and weakness (?due to swelling) noted in right hand during self-feeding, especially with cup drinking. Supervision/assist during meals is recommended for safety and efficiency.

## 2021-11-04 NOTE — PROGRESS NOTE ADULT - ASSESSMENT
ASSESSMENT/PLAN  72yo M w/ PMHx of CAD (s/p CABG 2019), CKD (unknown stage), DM2, Parkinson's Disease, HTN, depression presents with bilateral leg swelling  Mild CHF  Acute on chronic renal failure --getting worse   Severe LV dysfunction ;  A flutter   Confusion   Febrile       1 IR-  perm cath early next week  when out of MICU;  Pancx given the fever (has bridget )    2 Renal-   Next HD in am     3 CVS- BP is elevated this am (hold parameters on Midodrine when SBP> 160)  Lopressor for heart rate control     4GI-He pulled out the NG tube     5 Anemia - HH seems to be stable      [facility] ICU team     Sayed [facility]scale  [phone]

## 2021-11-05 NOTE — PROGRESS NOTE ADULT - SUBJECTIVE AND OBJECTIVE BOX
HPI:  70yo M w/  CAD (s/p CABG 2019), CKD (unknown stage), DM2, Parkinson's Disease, HTN, depression p/w b/l LE edema found to have ARF and NSTEMI w/ new Aflutter started on heparin gtt and HD. Course c/b RP bleed w/ anemia, admitted to the MICU for closer monitoring while on HD. Also with fluctuating mental status and confusion.     11/3 The patient was seen and examined with his son, Yunier, by his bed side. He denied pain; however, was grimacing and moaning when pressing on his LEs. He was clearly confused and with poor attention. He did not appear to be on any major distress beyond when pressing on his legs.     11/5 The patient is now out of the ICU. He is still having issues with attention and disorganized thinking. However, he was more alert and able to repeat some information but not able to retain it for a prolong time.        PERTINENT PM/SXH:   Hypertension    Diabetes Mellitus, Type 2    Hypercholesterolemia    Parkinson disease    CAD (coronary artery disease)    Leg swelling    Autoimmune disease    Insomnia    Vertigo    Gastroesophageal reflux disease without esophagitis    Nocturia    Urinary incontinence    Urinary frequency    Panic attacks    Osteoarthritis    Shoulder pain, left      Status Post Cardiac Catheterization    S/P angioplasty with stent    S/P CABG (coronary artery bypass graft)    S/P hip replacement, right    Anxiety    Depression, major      FAMILY HISTORY:  Family history of hypertension    Family history of malaria    Family history of pulmonary fibrosis (Sibling)      ITEMS NOT CHECKED ARE NOT PRESENT    SOCIAL HISTORY:   Significant other/partner[ ]  Children[ ]  Tenriism/Spirituality:  Substance hx:  [ ]   Tobacco hx:  [ ]   Alcohol hx: [ ]   Home Opioid hx:  [ ] I-Stop Reference No:  Living Situation: [x ]Home  [ ]Long term care  [ ]Rehab [ ]Other  Lives with son and his daughter in law. Was independent with ADLs until before this admission. He did not have a h/o dementia.   ADVANCE DIRECTIVES:    DNR  MOLST  [ ]  Living Will  [ ]   DECISION MAKER(s):  [x ] Health Care Proxy(s)  [ ] Surrogate(s)  [ ] Guardian           Name(s): Phone Number(s):    BASELINE (I)ADL(s) (prior to admission):  [place]: [ ]Total  [ ] Moderate [ ]Dependent    Allergies    adhesives (Rash)  latex (Urticaria)  No Known Drug Allergies    Intolerances    MEDICATIONS  (STANDING):  ascorbic acid 500 milliGRAM(s) Oral daily  atorvastatin 80 milliGRAM(s) Oral at bedtime  carbidopa/levodopa  25/100 2.5 Tablet(s) Oral <User Schedule>  carbidopa/levodopa  25/100 2 Tablet(s) Oral <User Schedule>  chlorhexidine 4% Liquid 1 Application(s) Topical <User Schedule>  cholecalciferol 1000 Unit(s) Oral daily  dextrose 40% Gel 15 Gram(s) Oral once  dextrose 5%. 1000 milliLiter(s) (50 mL/Hr) IV Continuous <Continuous>  dextrose 5%. 1000 milliLiter(s) (100 mL/Hr) IV Continuous <Continuous>  dextrose 50% Injectable 25 Gram(s) IV Push once  dextrose 50% Injectable 12.5 Gram(s) IV Push once  dextrose 50% Injectable 25 Gram(s) IV Push once  folic acid 1 milliGRAM(s) Oral daily  glucagon  Injectable 1 milliGRAM(s) IntraMuscular once  insulin glargine Injectable (LANTUS) 6 Unit(s) SubCutaneous at bedtime  insulin lispro (ADMELOG) corrective regimen sliding scale   SubCutaneous every 6 hours  levothyroxine 25 MICROGram(s) Oral daily  metoprolol tartrate 25 milliGRAM(s) Oral two times a day  midodrine 20 milliGRAM(s) Oral <User Schedule>  polyethylene glycol 3350 17 Gram(s) Oral two times a day  QUEtiapine 25 milliGRAM(s) Oral at bedtime  senna Syrup 10 milliLiter(s) Oral at bedtime  sevelamer carbonate 1600 milliGRAM(s) Oral three times a day  trihexyphenidyl 2 milliGRAM(s) Oral three times a day    MEDICATIONS  (PRN):  acetaminophen     Tablet .. 650 milliGRAM(s) Oral every 6 hours PRN Mild Pain (1 - 3)  albuterol/ipratropium for Nebulization 3 milliLiter(s) Nebulizer every 6 hours PRN Shortness of Breath and/or Wheezing  guaiFENesin Oral Liquid (Sugar-Free) 200 milliGRAM(s) Oral every 6 hours PRN Cough  sodium chloride 0.9% lock flush 10 milliLiter(s) IV Push every 1 hour PRN Pre/post blood products, medications, blood draw, and to maintain line patency        PRESENT SYMPTOMS: [x ]Unable to obtain due to poor mentation   Source if other than patient:  [ ]Family   [ ]Team     Pain: [x ]yes [ ]no  QOL impact - not able to indicate  Location -                  legs   Aggravating factors - palpation   Quality - not able to indicate  Radiation - none   Timing- with palpation of LEs   Severity (0-10 scale): see pain AD  Minimal acceptable level (0-10 scale):     CPOT:    https://www.[email or web address]/getattachment/[email or web address]/Critical-Care-Pain-Observation-Tool-(CPOT)      PAIN AD Score: 7 with palpation     [email or web address]/painad.pdf (press ctrl +  left click to view)    Dyspnea:                           [ ]Mild [ ]Moderate [ ]Severe  Anxiety:                             [ ]Mild [ ]Moderate [ ]Severe  Fatigue:                             [ ]Mild [ ]Moderate [ ]Severe  Nausea:                             [ ]Mild [ ]Moderate [ ]Severe  Loss of appetite:              [ ]Mild [ ]Moderate [ ]Severe  Constipation:                    [ ]Mild [ ]Moderate [ ]Severe    Other Symptoms:  [ ]All other review of systems negative     Palliative Performance Status Version 2:  30       %    http://npcrc.org/files/news/palliative_performance_scale_ppsv2.pdf  PHYSICAL EXAM:  Vital Signs Last 24 Hrs  T(C): 37 (05 Nov 2021 13:10), Max: 37 (05 Nov 2021 13:10)  T(F): 98.6 (05 Nov 2021 13:10), Max: 98.6 (05 Nov 2021 13:10)  HR: 81 (05 Nov 2021 13:10) (81 - 92)  BP: 163/84 (05 Nov 2021 13:10) (140/85 - 163/84)  BP(mean): --  RR: 16 (05 Nov 2021 13:10) (16 - 18)  SpO2: 96% (05 Nov 2021 13:10) (89% - 96%)      GENERAL:  [x ]Alert  [ x]Oriented x  1 [ ]Lethargic  [ ]Cachexia  [ ]Unarousable  [ ]Verbal  [ ]Non-Verbal  Behavioral:   [ ] Anxiety  [x ] Delirium [ ] Agitation [ ] Other  HEENT:  [ ]Normal   [x ]Dry mouth   [ ]ET Tube/Trach  [ ]Oral lesions  PULMONARY:   [x ]Clear [ ]Tachypnea  [ ]Audible excessive secretions   [ ]Rhonchi        [ ]Right [ ]Left [ ]Bilateral  [ ]Crackles        [ ]Right [ ]Left [ ]Bilateral  [ ]Wheezing     [ ]Right [ ]Left [ ]Bilateral  [ ]Diminished breath sounds [ ]right [ ]left [ ]bilateral  CARDIOVASCULAR:    [x ]Regular [ ]Irregular [ ]Tachy  [ ]Trevon [ ]Murmur [ ]Other  GASTROINTESTINAL:  [x ]Soft  [ ]Distended   [x ]+BS  [ x]Non tender [ ]Tender  [ ]PEG [ ]OGT/ NGT  Last BM: 11/4  GENITOURINARY:  [ ]Normal [x ] Incontinent   [x ]Oliguria/Anuria   [ ]Javier  MUSCULOSKELETAL:   [ ]Normal   [x ]Weakness  [ ]Bed/Wheelchair bound [ ]Edema  NEUROLOGIC:   [ ]No focal deficits  [x ]Cognitive impairment  [ ]Dysphagia [ ]Dysarthria [ ]Paresis [x ]Other: Delirium   SKIN:   [ ]Normal    [ ]Rash  [ ]Pressure ulcer(s)       Present on admission [ ]y [ ]n [x] Venous stasis changes over LE.     CRITICAL CARE:  [ ] Shock Present  [ ]Septic [ ]Cardiogenic [ ]Neurologic [ ]Hypovolemic  [ ]  Vasopressors [ ]  Inotropes   [ ]Respiratory failure present [ ]Mechanical ventilation [ ]Non-invasive ventilatory support [ ]High flow    [ ]Acute  [ ]Chronic [ ]Hypoxic  [ ]Hypercarbic [ ]Other  [ ]Other organ failure     LABS:                                       7.3    10.64 )-----------( 180      ( 05 Nov 2021 07:05 )             23.4   11-05    138  |  97  |  60<H>  ----------------------------<  138<H>  4.0   |  23  |  5.57<H>    Ca    9.1      05 Nov 2021 07:26  Phos  4.4     11-05  Mg     2.4     11-05    TPro  6.9  /  Alb  2.7<L>  /  TBili  1.3<H>  /  DBili  x   /  AST  33  /  ALT  6<L>  /  AlkPhos  139<H>  11-05      RADIOLOGY & ADDITIONAL STUDIES:  < from: TTE with Doppler (w/Cont) (10.21.21 @ 17:11) >    Patient name: ORLIN HARDIN  YOB: 1950   Age: 71 (M)   MR#: [id number]  Study Date: 10/21/2021EF (Visual Estimate): 35 %Conclusions:  1. Calcified trileaflet aortic valve with normal opening.  Peak transaortic valve gradient equals 10 mm Hg, mean  transaortic valve gradient equals 6 mm Hg, aortic valve  velocity time integral equals 40 cm, estimated aortic valve  area equals 2.1 sqcm.  2. Endocardial visualization enhanced with intravenous  injection of Ultrasonic Enhancing Agent (Definity).  moderate to severe segmental left ventricular systolic  dysfunction. There is hypokinesis of anterior wall, septum  and apex. Visual estimate of EF about 35%.  3. Normal right ventricular size with decreased right  ventricular systolic function.  4. Normal tricuspid valve. Mild tricuspid regurgitation.  5. Estimated pulmonary artery systolic pressure equals 24  mm Hg, assuming right atrial pressure equals 8  mm Hg,  consistent with normal pulmonary pressures.  6. Bilateral pleural effusions.  ------------------------------------------------------------------------  Confirmed on  10/21/2021 - 19:08:16 by Aicha Pettit M.D.  ------------------------------------------------------------------------    < end of copied text >  < from: CT Abdomen and Pelvis w/ IV Cont (10.27.21 @ 17:13) >    EXAM:  CT ABDOMEN AND PELVIS IC                            PROCEDURE DATE:  10/27/2021    IMPRESSION:  Study limited by delayed arterial phase.    Interval increase in size of a now large left retroperitoneal hematoma measuring up to 18.3 cm in craniocaudal dimension, previously 7.4 cm, with suspicion for contrast extravasation on arterial phase imaging, suspicious for active bleed. Essentially unchanged small right retroperitoneal hematoma.    Left kidney is now anteriorly displaced by the large left retroperitoneal hematoma, which also contacts the mid to distal ureter which results in new mild left sided hydroureteronephrosis.    Unchanged moderate left and small right pleural effusions and unchanged pulmonary opacities at the lung bases.    A 1.2 cm cystic lesion in the pancreatic neck. Consider further evaluation on a nonemergent outpatient basis with MRI/MRCP.    New small ascites.    Findings regarding the left retroperitoneal hemorrhagewere discussed by Abbey DAMON with Gilberto NP on 10/27/2021 at 5:56 PM.    --- End of Report ---    < end of copied text >      PROTEIN CALORIE MALNUTRITION PRESENT: [ ]mild [ ]moderate [ ]severe [ ]underweight [ ]morbid obesity  https://www.andeal.org/vault/2440/web/files/ONC/Table_Clinical%20Characteristics%20to%20Document%20Malnutrition-White%20JV%20et%20al%202012.pdf    Height (cm): 180.3 (10-27-21 @ 21:30)  Weight (kg): 96.4 (10-27-21 @ 21:30)  BMI (kg/m2): 29.7 (10-27-21 @ 21:30)    [ ]PPSV2 < or = to 30% [ ]significant weight loss  [ ]poor nutritional intake  [ ]anasarca      [ ]Artificial Nutrition      REFERRALS:   [ ]Chaplaincy  [ ]Hospice  [ ]Child Life  [ ]Social Work  [ ]Case management [ ]Holistic Therapy     Goals of Care Document:

## 2021-11-05 NOTE — PROGRESS NOTE ADULT - ASSESSMENT
Assessment  DMT2: 71y Male with DM T2 with hyperglycemia, A1C 6.4%, was on insulin at home, now on low-dose basal insulin and coverage, blood sugars are  trending within acceptable range, no hypoglycemic episodes, eating small portions, appears comfortable, transferred to medical floor.  Hypothyroidism: Patient has no history thyroid disease, was not on any thyroid supplements, subclinical with low-normal FT4, lethargic, now on synthroid 25 mcg po daily.  CHF: on medications, stable, monitored.  HTN: Controlled,  on antihypertensive medications.  Parkinsons: on meds, monitored.  CKD: Monitor labs/BMP      Kelsie Reece MD  Cell: [phone]  Office: [phone]     Assessment  DMT2: 71y Male with DM T2 with hyperglycemia, A1C 6.4%, was on insulin at home, now on low-dose basal insulin and coverage, blood sugars are  trending within acceptable range, no hypoglycemic episodes,  eating small portions, appears comfortable, transferred to medical floor.  Hypothyroidism: Patient has no history thyroid disease, was not on any thyroid supplements, subclinical with low-normal FT4, lethargic, now on synthroid 25 mcg po daily.  CHF: on medications, stable, monitored.  HTN: Controlled,  on antihypertensive medications.  Parkinsons: on meds, monitored.  CKD: Monitor labs/BMP      Kelsie Reece MD  Cell: [phone]  Office: [phone]

## 2021-11-05 NOTE — PROGRESS NOTE ADULT - ASSESSMENT
72yo M w/ PMHx of CAD (s/p CABG 2019), CKD (unknown stage), DM2, Parkinson's Disease, HTN, depression presents with new onset acute heart failure exacerbation, NSTEMI, started on hep gtt, now with bl RP bleed, acute anemia. transferred to MICU.    # Acute systolic and diastolic heart failure  # NSTEMI  # ABBY on CKD- newly started HD  # Atrial flutter  # acute hypoxic resp failure  # hemorrhagic shock, left RP hematoma, acute blood loss anemia  TTE mod to severe LV SD, hypokinesis anterior wall, not candidate for cath at this point  HD per renal  10/28 sp IR embolization l4 and l5 lumbar artery  consider repeat CTA as h/h not improved  on midodrine,  during dialysis  sp MICU course  swallow eval- passed- monitor on regular diet    # PTT prolonged  did not correct on mixing study, heme following    # DM2 (diabetes mellitus, type 2)  per endo  hba1c 6.4    # CAD (coronary artery disease)  c/w atorvastatin  asa on hold    # Parkinsons disease   # delirium  carbidopa/levodopa restarted  c/w trihexyphenidyl  seroquel  if no improvement in MS /or worsening consider psych cs    PCP Dr. Simons

## 2021-11-05 NOTE — PROGRESS NOTE ADULT - SUBJECTIVE AND OBJECTIVE BOX
INTERVAL HPI/OVERNIGHT EVENTS:  pt seen and examined  denies abdominal pain, n/v  tolerating diet, passing flatus   no rectal bleeding    MEDICATIONS  (STANDING):  ascorbic acid 500 milliGRAM(s) Oral daily  atorvastatin 80 milliGRAM(s) Oral at bedtime  carbidopa/levodopa  25/100 2.5 Tablet(s) Oral <User Schedule>  carbidopa/levodopa  25/100 2 Tablet(s) Oral <User Schedule>  chlorhexidine 4% Liquid 1 Application(s) Topical <User Schedule>  cholecalciferol 1000 Unit(s) Oral daily  dextrose 40% Gel 15 Gram(s) Oral once  dextrose 5%. 1000 milliLiter(s) (50 mL/Hr) IV Continuous <Continuous>  dextrose 5%. 1000 milliLiter(s) (100 mL/Hr) IV Continuous <Continuous>  dextrose 50% Injectable 25 Gram(s) IV Push once  dextrose 50% Injectable 12.5 Gram(s) IV Push once  dextrose 50% Injectable 25 Gram(s) IV Push once  epoetin mari-epbx (RETACRIT) Injectable 33477 Unit(s) IV Push once  folic acid 1 milliGRAM(s) Oral daily  glucagon  Injectable 1 milliGRAM(s) IntraMuscular once  insulin glargine Injectable (LANTUS) 6 Unit(s) SubCutaneous at bedtime  insulin lispro (ADMELOG) corrective regimen sliding scale   SubCutaneous every 6 hours  levothyroxine 25 MICROGram(s) Oral daily  metoprolol tartrate 25 milliGRAM(s) Oral two times a day  midodrine 20 milliGRAM(s) Oral <User Schedule>  polyethylene glycol 3350 17 Gram(s) Oral two times a day  QUEtiapine 25 milliGRAM(s) Oral at bedtime  senna Syrup 10 milliLiter(s) Oral at bedtime  sevelamer carbonate 1600 milliGRAM(s) Oral three times a day  trihexyphenidyl 2 milliGRAM(s) Oral three times a day    MEDICATIONS  (PRN):  acetaminophen     Tablet .. 650 milliGRAM(s) Oral every 6 hours PRN Mild Pain (1 - 3)  albuterol/ipratropium for Nebulization 3 milliLiter(s) Nebulizer every 6 hours PRN Shortness of Breath and/or Wheezing  guaiFENesin Oral Liquid (Sugar-Free) 200 milliGRAM(s) Oral every 6 hours PRN Cough  sodium chloride 0.9% lock flush 10 milliLiter(s) IV Push every 1 hour PRN Pre/post blood products, medications, blood draw, and to maintain line patency      Allergies    adhesives (Rash)  latex (Urticaria)  No Known Drug Allergies    Intolerances        Review of Systems:    General:  No wt loss, fevers, chills, night sweats,fatigue,   Eyes:  Good vision, no reported pain  ENT:  No sore throat, pain, runny nose, dysphagia  CV:  No pain, palpitations, hypo/hypertension  Resp:  No dyspnea, cough, tachypnea, wheezing  GI:  No pain, No nausea, No vomiting, No diarrhea, No constipation, No weight loss, No fever, No pruritis, No rectal bleeding, No melena, No dysphagia  :  No pain, bleeding, incontinence, nocturia  Muscle:  No pain, weakness  Neuro:  No weakness, tingling, memory problems  Psych:  No fatigue, insomnia, mood problems, depression  Endocrine:  No polyuria, polydypsia, cold/heat intolerance  Heme:  No petechiae, ecchymosis, easy bruisability  Skin:  No rash, tattoos, scars, edema      Vital Signs Last 24 Hrs  T(C): 37 (05 Nov 2021 13:10), Max: 37 (05 Nov 2021 13:10)  T(F): 98.6 (05 Nov 2021 13:10), Max: 98.6 (05 Nov 2021 13:10)  HR: 81 (05 Nov 2021 13:10) (81 - 98)  BP: 163/84 (05 Nov 2021 13:10) (112/55 - 167/74)  BP(mean): 106 (04 Nov 2021 17:00) (79 - 106)  RR: 16 (05 Nov 2021 13:10) (16 - 20)  SpO2: 96% (05 Nov 2021 13:10) (89% - 97%)    PHYSICAL EXAM:    Constitutional: NAD, well-developed  HEENT: EOMI, throat clear  Neck: No LAD, supple  Respiratory: CTA and P  Cardiovascular: S1 and S2, RRR, no M  Gastrointestinal: BS+, soft, NT/ND, neg HSM,  Extremities: No peripheral edema, neg clubing, cyanosis  Vascular: 2+ peripheral pulses  Neurological: A/O x 3, no focal deficits  Psychiatric: Normal mood, normal affect  Skin: No rashes      LABS:                        7.3    10.64 )-----------( 180      ( 05 Nov 2021 07:05 )             23.4     11-05    138  |  97  |  60<H>  ----------------------------<  138<H>  4.0   |  23  |  5.57<H>    Ca    9.1      05 Nov 2021 07:26  Phos  4.4     11-05  Mg     2.4     11-05    TPro  6.9  /  Alb  2.7<L>  /  TBili  1.3<H>  /  DBili  x   /  AST  33  /  ALT  6<L>  /  AlkPhos  139<H>  11-05    PT/INR - ( 04 Nov 2021 00:30 )   PT: 13.2 sec;   INR: 1.11 ratio         PTT - ( 04 Nov 2021 00:30 )  PTT:58.4 sec      RADIOLOGY & ADDITIONAL TESTS:

## 2021-11-05 NOTE — PROGRESS NOTE ADULT - PROBLEM SELECTOR PLAN 4
I called the patient's son and updated him regarding the patient's condition. I also indicated that a HCP form was not available in the EMR. His son indicated he will bring the HCP form over the weekend.

## 2021-11-05 NOTE — PROGRESS NOTE ADULT - ASSESSMENT
71 M w volume overload, GI consulted for drop in hgb    1. Volume overload per cardio    2. ABBY on CKD per renal    3. Drop in hgb, no overt GI Bleed.      4. RP bleed  s/p Abdominal Angiography and Embolization on 10/29/2021 by Interventional radiology of l4and l5 lumbar artery  consider repeat CTA as h/h not improved    5. abdominal distention  - AXR nonobstructive, give miralax BID    6. Elevated bilirubin likely due to hematoma    The plan of care was discussed with the physician assistant and modifications were made to the notation where appropriate.   Differential diagnosis and plan of care discussed with patient after the evaluation  35 minutes spent on total encounter of which more than fifty percent of the encounter was spent counseling and/or coordinating care by the attending physician.    [facility]  Gastroenterology and Hepatology  [address] [place], [place]  Office: [phone]  Cell: [phone]

## 2021-11-05 NOTE — PROGRESS NOTE ADULT - ASSESSMENT
72yo M w/  CAD (s/p CABG 2019), CKD (unknown stage), DM2, Parkinson's Disease, HTN, depression p/w b/l LE edema found to have ARF and NSTEMI w/ new Aflutter started on heparin gtt and HD. Course c/b RP bleed w/ anemia, admitted to the MICU for closer monitoring while on HD. Also with fluctuating mental status and confusion.

## 2021-11-05 NOTE — PROGRESS NOTE ADULT - PROBLEM SELECTOR PLAN 2
Will continue synthroid 25 mcg po daily. Will repeat FT4 and FU.  Suggest to FU outpatient in 4-6 weeks to repeat TFTs.  Discussed plan with patient.

## 2021-11-05 NOTE — CHART NOTE - NSCHARTNOTEFT_GEN_A_CORE
Medicine [phone]) [phone]; notified by Nurse Manager pt. removed Shiley DSD last evening approx. 2230  Evening ANM visualized  right chest wall Shiley site @ that time; no bleeding  ANM called HD for HD RN to visualize site. Shiley  DSD applied by HD RN    NP today visualized Shiley site; DSD D/I, no bleeding @ site  Dr. MARY Frias notified; no need for CXR or further studies @ present  Pt. scheduled for HD today  B/L unsecured mittens changed to secured mittens. pt remains lethargic, confused  1630; Pt. HCP Yoly RN  aware pt. removed Shiley DSD; she requested B/L wrist restraints  Mittens changed to wrist restraints per request; RN notified.

## 2021-11-05 NOTE — PROGRESS NOTE ADULT - ASSESSMENT
ABBY now on HD  Acute on chronic systolic CHF with fluid overload  Bilateral L>>R pleural effusions  S/P RP bleed on AC   Atrial Flutter/Fib    REC    Monitor hb  HD per renal

## 2021-11-05 NOTE — PROGRESS NOTE ADULT - PROBLEM SELECTOR PLAN 1
Work up an treatment of possible causes as per the primary team.   Will also advise:   -Frequent reassurance and verbal orientation   -Family members or other familiar persons by his bedside.   -Delusions and hallucinations should be neither endorsed nor challenged.   -Physical restraints should be avoided. Alternatives to restraint use, such as constant observation (preferably by someone familiar to the patient such as a family member), may be more effective.  -PT eval and early ambulation if possible  -Move to a room with a window   -Update the calendar date in the room.   -Transfer out form ICU when possible.

## 2021-11-05 NOTE — PROGRESS NOTE ADULT - SUBJECTIVE AND OBJECTIVE BOX
Patient is a 71y old  Male who presents with a chief complaint of leg swelling (05 Nov 2021 13:18)      INTERVAL HPI/OVERNIGHT EVENTS: noted  pt seen and examined this am   events noted  c/o hip pain  confused    Vital Signs Last 24 Hrs  T(C): 36.4 (05 Nov 2021 12:16), Max: 36.6 (04 Nov 2021 20:01)  T(F): 97.5 (05 Nov 2021 12:16), Max: 97.9 (04 Nov 2021 20:01)  HR: 82 (05 Nov 2021 12:16) (81 - 98)  BP: 154/76 (05 Nov 2021 12:16) (112/55 - 167/74)  BP(mean): 106 (04 Nov 2021 17:00) (79 - 106)  RR: 18 (05 Nov 2021 12:16) (14 - 20)  SpO2: 95% (05 Nov 2021 12:16) (89% - 97%)    acetaminophen     Tablet .. 650 milliGRAM(s) Oral every 6 hours PRN  albuterol/ipratropium for Nebulization 3 milliLiter(s) Nebulizer every 6 hours PRN  ascorbic acid 500 milliGRAM(s) Oral daily  atorvastatin 80 milliGRAM(s) Oral at bedtime  carbidopa/levodopa  25/100 2.5 Tablet(s) Oral <User Schedule>  carbidopa/levodopa  25/100 2 Tablet(s) Oral <User Schedule>  chlorhexidine 4% Liquid 1 Application(s) Topical <User Schedule>  cholecalciferol 1000 Unit(s) Oral daily  dextrose 40% Gel 15 Gram(s) Oral once  dextrose 5%. 1000 milliLiter(s) IV Continuous <Continuous>  dextrose 5%. 1000 milliLiter(s) IV Continuous <Continuous>  dextrose 50% Injectable 25 Gram(s) IV Push once  dextrose 50% Injectable 12.5 Gram(s) IV Push once  dextrose 50% Injectable 25 Gram(s) IV Push once  epoetin mari-epbx (RETACRIT) Injectable 04600 Unit(s) IV Push once  folic acid 1 milliGRAM(s) Oral daily  glucagon  Injectable 1 milliGRAM(s) IntraMuscular once  guaiFENesin Oral Liquid (Sugar-Free) 200 milliGRAM(s) Oral every 6 hours PRN  insulin glargine Injectable (LANTUS) 6 Unit(s) SubCutaneous at bedtime  insulin lispro (ADMELOG) corrective regimen sliding scale   SubCutaneous every 6 hours  levothyroxine 25 MICROGram(s) Oral daily  metoprolol tartrate 25 milliGRAM(s) Oral two times a day  midodrine 20 milliGRAM(s) Oral <User Schedule>  polyethylene glycol 3350 17 Gram(s) Oral two times a day  QUEtiapine 25 milliGRAM(s) Oral at bedtime  senna Syrup 10 milliLiter(s) Oral at bedtime  sevelamer carbonate 1600 milliGRAM(s) Oral three times a day  sodium chloride 0.9% lock flush 10 milliLiter(s) IV Push every 1 hour PRN  trihexyphenidyl 2 milliGRAM(s) Oral three times a day      PHYSICAL EXAM:  GENERAL: NAD,   EYES: conjunctiva and sclera clear  ENMT: Moist mucous membranes  NECK: Supple, No JVD, Normal thyroid  CHEST/LUNG: non labored, cta b/l  HEART: Regular rate and rhythm; No murmurs, rubs, or gallops  ABDOMEN: Soft, Nontender, Nondistended; Bowel sounds present  EXTREMITIES:  2+ Peripheral Pulses, No clubbing, cyanosis, or edema  LYMPH: No lymphadenopathy noted  SKIN: No rashes or lesions    Consultant(s) Notes Reviewed:  [x ] YES  [ ] NO  Care Discussed with Consultants/Other Providers [ x] YES  [ ] NO    LABS:                        7.3    10.64 )-----------( 180      ( 05 Nov 2021 07:05 )             23.4     11-05    138  |  97  |  60<H>  ----------------------------<  138<H>  4.0   |  23  |  5.57<H>    Ca    9.1      05 Nov 2021 07:26  Phos  4.4     11-05  Mg     2.4     11-05    TPro  6.9  /  Alb  2.7<L>  /  TBili  1.3<H>  /  DBili  x   /  AST  33  /  ALT  6<L>  /  AlkPhos  139<H>  11-05    PT/INR - ( 04 Nov 2021 00:30 )   PT: 13.2 sec;   INR: 1.11 ratio         PTT - ( 04 Nov 2021 00:30 )  PTT:58.4 sec    CAPILLARY BLOOD GLUCOSE      POCT Blood Glucose.: 106 mg/dL (05 Nov 2021 07:04)  POCT Blood Glucose.: 155 mg/dL (05 Nov 2021 00:08)  POCT Blood Glucose.: 153 mg/dL (04 Nov 2021 22:23)  POCT Blood Glucose.: 191 mg/dL (04 Nov 2021 16:57)              RADIOLOGY & ADDITIONAL TESTS:    Imaging Personally Reviewed:  [x ] YES  [ ] NO

## 2021-11-05 NOTE — PROGRESS NOTE ADULT - SUBJECTIVE AND OBJECTIVE BOX
Chief complaint  Patient is a 71y old  Male who presents with a chief complaint of leg swelling (05 Nov 2021 10:21)   Review of systems  Patient in bed, looks comfortable, no hypoglycemic episodes.    Labs and Fingersticks  CAPILLARY BLOOD GLUCOSE      POCT Blood Glucose.: 106 mg/dL (05 Nov 2021 07:04)  POCT Blood Glucose.: 155 mg/dL (05 Nov 2021 00:08)  POCT Blood Glucose.: 153 mg/dL (04 Nov 2021 22:23)  POCT Blood Glucose.: 191 mg/dL (04 Nov 2021 16:57)      Anion Gap, Serum: 18 *H* (11-05 @ 07:26)  Anion Gap, Serum: 17 (11-04 @ 00:30)      Calcium, Total Serum: 9.1 (11-05 @ 07:26)  Calcium, Total Serum: 9.1 (11-04 @ 00:30)  Albumin, Serum: 2.7 *L* (11-05 @ 07:26)  Albumin, Serum: 2.7 *L* (11-04 @ 00:30)    Alanine Aminotransferase (ALT/SGPT): 6 *L* (11-05 @ 07:26)  Alanine Aminotransferase (ALT/SGPT): 8 *L* (11-04 @ 00:30)  Alkaline Phosphatase, Serum: 139 *H* (11-05 @ 07:26)  Alkaline Phosphatase, Serum: 141 *H* (11-04 @ 00:30)  Aspartate Aminotransferase (AST/SGOT): 33 (11-05 @ 07:26)  Aspartate Aminotransferase (AST/SGOT): 43 *H* (11-04 @ 00:30)        11-05    138  |  97  |  60<H>  ----------------------------<  138<H>  4.0   |  23  |  5.57<H>    Ca    9.1      05 Nov 2021 07:26  Phos  4.4     11-05  Mg     2.4     11-05    TPro  6.9  /  Alb  2.7<L>  /  TBili  1.3<H>  /  DBili  x   /  AST  33  /  ALT  6<L>  /  AlkPhos  139<H>  11-05                        7.3    10.64 )-----------( 180      ( 05 Nov 2021 07:05 )             23.4     Medications  MEDICATIONS  (STANDING):  ascorbic acid 500 milliGRAM(s) Oral daily  atorvastatin 80 milliGRAM(s) Oral at bedtime  carbidopa/levodopa  25/100 2.5 Tablet(s) Oral <User Schedule>  carbidopa/levodopa  25/100 2 Tablet(s) Oral <User Schedule>  chlorhexidine 4% Liquid 1 Application(s) Topical <User Schedule>  cholecalciferol 1000 Unit(s) Oral daily  dextrose 40% Gel 15 Gram(s) Oral once  dextrose 5%. 1000 milliLiter(s) (50 mL/Hr) IV Continuous <Continuous>  dextrose 5%. 1000 milliLiter(s) (100 mL/Hr) IV Continuous <Continuous>  dextrose 50% Injectable 25 Gram(s) IV Push once  dextrose 50% Injectable 12.5 Gram(s) IV Push once  dextrose 50% Injectable 25 Gram(s) IV Push once  epoetin mari-epbx (RETACRIT) Injectable 30250 Unit(s) IV Push once  folic acid 1 milliGRAM(s) Oral daily  glucagon  Injectable 1 milliGRAM(s) IntraMuscular once  insulin glargine Injectable (LANTUS) 6 Unit(s) SubCutaneous at bedtime  insulin lispro (ADMELOG) corrective regimen sliding scale   SubCutaneous every 6 hours  levothyroxine 25 MICROGram(s) Oral daily  metoprolol tartrate 25 milliGRAM(s) Oral two times a day  midodrine 20 milliGRAM(s) Oral <User Schedule>  polyethylene glycol 3350 17 Gram(s) Oral two times a day  QUEtiapine 25 milliGRAM(s) Oral at bedtime  senna Syrup 10 milliLiter(s) Oral at bedtime  sevelamer carbonate 1600 milliGRAM(s) Oral three times a day  trihexyphenidyl 2 milliGRAM(s) Oral three times a day      Physical Exam  General: Patient comfortable in bed  Vital Signs Last 12 Hrs  T(F): 97.5 (11-05-21 @ 12:16), Max: 97.7 (11-05-21 @ 05:09)  HR: 82 (11-05-21 @ 12:16) (81 - 82)  BP: 154/76 (11-05-21 @ 12:16) (140/85 - 154/76)  BP(mean): --  RR: 18 (11-05-21 @ 12:16) (18 - 18)  SpO2: 95% (11-05-21 @ 12:16) (94% - 95%)  Neck: No palpable thyroid nodules.  CVS: S1S2, No murmurs  Respiratory: No wheezing, no crepitations  GI: Abdomen soft, bowel sounds positive  Musculoskeletal:  edema lower extremities.   Skin: No skin rashes, no ecchymosis    Diagnostics    Free Thyroxine, Serum: AM Sched. Collection: 06-Nov-2021 06:00 (11-05 @ 13:18)  Free Thyroxine, Serum: AM Sched. Collection: 26-Oct-2021 06:00 (10-25 @ 11:45)  Thyroid Stimulating Hormone, Serum: AM Sched. Collection: 26-Oct-2021 06:00 (10-25 @ 11:45)  Cortisol AM, Serum: 08:00 (10-22 @ 12:47)  Free Thyroxine, Serum: AM Sched. Collection: 23-Oct-2021 06:00 (10-22 @ 12:43)  Thyroid Stimulating Hormone, Serum: AM Sched. Collection: 23-Oct-2021 06:00 (10-22 @ 12:43)           Chief complaint  Patient is a 71y old  Male who presents with a chief complaint of leg swelling (05 Nov 2021 10:21)   Review of systems  Patient in bed, looks comfortable, no hypoglycemic episodes.    Labs and Fingersticks  CAPILLARY BLOOD GLUCOSE      POCT Blood Glucose.: 106 mg/dL (05 Nov 2021 07:04)  POCT Blood Glucose.: 155 mg/dL (05 Nov 2021 00:08)  POCT Blood Glucose.: 153 mg/dL (04 Nov 2021 22:23)  POCT Blood Glucose.: 191 mg/dL (04 Nov 2021 16:57)      Anion Gap, Serum: 18 *H* (11-05 @ 07:26)  Anion Gap, Serum: 17 (11-04 @ 00:30)      Calcium, Total Serum: 9.1 (11-05 @ 07:26)  Calcium, Total Serum: 9.1 (11-04 @ 00:30)  Albumin, Serum: 2.7 *L* (11-05 @ 07:26)  Albumin, Serum: 2.7 *L* (11-04 @ 00:30)    Alanine Aminotransferase (ALT/SGPT): 6 *L* (11-05 @ 07:26)  Alanine Aminotransferase (ALT/SGPT): 8 *L* (11-04 @ 00:30)  Alkaline Phosphatase, Serum: 139 *H* (11-05 @ 07:26)  Alkaline Phosphatase, Serum: 141 *H* (11-04 @ 00:30)  Aspartate Aminotransferase (AST/SGOT): 33 (11-05 @ 07:26)  Aspartate Aminotransferase (AST/SGOT): 43 *H* (11-04 @ 00:30)        11-05    138  |  97  |  60<H>  ----------------------------<  138<H>  4.0   |  23  |  5.57<H>    Ca    9.1      05 Nov 2021 07:26  Phos  4.4     11-05  Mg     2.4     11-05    TPro  6.9  /  Alb  2.7<L>  /  TBili  1.3<H>  /  DBili  x   /  AST  33  /  ALT  6<L>  /  AlkPhos  139<H>  11-05                        7.3    10.64 )-----------( 180      ( 05 Nov 2021 07:05 )             23.4     Medications  MEDICATIONS  (STANDING):  ascorbic acid 500 milliGRAM(s) Oral daily  atorvastatin 80 milliGRAM(s) Oral at bedtime  carbidopa/levodopa  25/100 2.5 Tablet(s) Oral <User Schedule>  carbidopa/levodopa  25/100 2 Tablet(s) Oral <User Schedule>  chlorhexidine 4% Liquid 1 Application(s) Topical <User Schedule>  cholecalciferol 1000 Unit(s) Oral daily  dextrose 40% Gel 15 Gram(s) Oral once  dextrose 5%. 1000 milliLiter(s) (50 mL/Hr) IV Continuous <Continuous>  dextrose 5%. 1000 milliLiter(s) (100 mL/Hr) IV Continuous <Continuous>  dextrose 50% Injectable 25 Gram(s) IV Push once  dextrose 50% Injectable 12.5 Gram(s) IV Push once  dextrose 50% Injectable 25 Gram(s) IV Push once  epoetin mari-epbx (RETACRIT) Injectable 71670 Unit(s) IV Push once  folic acid 1 milliGRAM(s) Oral daily  glucagon  Injectable 1 milliGRAM(s) IntraMuscular once  insulin glargine Injectable (LANTUS) 6 Unit(s) SubCutaneous at bedtime  insulin lispro (ADMELOG) corrective regimen sliding scale   SubCutaneous every 6 hours  levothyroxine 25 MICROGram(s) Oral daily  metoprolol tartrate 25 milliGRAM(s) Oral two times a day  midodrine 20 milliGRAM(s) Oral <User Schedule>  polyethylene glycol 3350 17 Gram(s) Oral two times a day  QUEtiapine 25 milliGRAM(s) Oral at bedtime  senna Syrup 10 milliLiter(s) Oral at bedtime  sevelamer carbonate 1600 milliGRAM(s) Oral three times a day  trihexyphenidyl 2 milliGRAM(s) Oral three times a day      Physical Exam  General: Patient comfortable in bed  Vital Signs Last 12 Hrs  T(F): 97.5 (11-05-21 @ 12:16), Max: 97.7 (11-05-21 @ 05:09)  HR: 82 (11-05-21 @ 12:16) (81 - 82)  BP: 154/76 (11-05-21 @ 12:16) (140/85 - 154/76)  BP(mean): --  RR: 18 (11-05-21 @ 12:16) (18 - 18)  SpO2: 95% (11-05-21 @ 12:16) (94% - 95%)  Neck: No palpable thyroid nodules.  CVS: S1S2, No murmurs  Respiratory: No wheezing, no crepitations  GI: Abdomen soft, bowel sounds positive  Musculoskeletal:  edema lower extremities.   Skin: No skin rashes, no ecchymosis    Diagnostics    Free Thyroxine, Serum: AM Sched. Collection: 06-Nov-2021 06:00 (11-05 @ 13:18)  Free Thyroxine, Serum: AM Sched. Collection: 26-Oct-2021 06:00 (10-25 @ 11:45)  Thyroid Stimulating Hormone, Serum: AM Sched. Collection: 26-Oct-2021 06:00 (10-25 @ 11:45)  Cortisol AM, Serum: 08:00 (10-22 @ 12:47)  Free Thyroxine, Serum: AM Sched. Collection: 23-Oct-2021 06:00 (10-22 @ 12:43)  Thyroid Stimulating Hormone, Serum: AM Sched. Collection: 23-Oct-2021 06:00 (10-22 @ 12:43)

## 2021-11-05 NOTE — PROGRESS NOTE ADULT - ASSESSMENT
ASSESSMENT/PLAN  72yo M w/ PMHx of CAD (s/p CABG 2019), CKD (unknown stage), DM2, Parkinson's Disease, HTN, depression presents with bilateral leg swelling  Mild CHF  Acute on chronic renal failure --getting worse   Severe LV dysfunction ;  A flutter   Confusion   Febrile       1 IR-  perm cath needed and will need vasc consult for AVG this amdmission     2 Renal-   Next HD today     3 CVS- BP is elevated this am (hold parameters on Midodrine when SBP> 160) and may be able to reduce the Midodrine dosing   Lopressor for heart rate control     4 Anemia - Retacrit at HD        Sayed CourseAdvisor  [phone]

## 2021-11-05 NOTE — PROGRESS NOTE ADULT - PROBLEM SELECTOR PLAN 5
Will continue to f/u for GOC, ACP, and resources.        Dennis Juarez MD   Geriatrics and Palliative Care (GAP) Consult Service    of Geriatric and Palliative Medicine  [facility]      Please page the following number for clinical matters between the hours of 9 am and 5 pm from Monday through Friday : [phone]    After 5pm and on weekends, please see the contact information below:    In the event of newly developing, evolving, or worsening symptoms, please contact the Palliative Medicine team via pager (if the patient is at [facility] #[phone] or if the patient is at [facility] #[phone]) The Geriatric and Palliative Medicine service has coverage 24 hours a day/ 7 days a week to provide medical recommendations regarding symptom management needs via telephone

## 2021-11-05 NOTE — PROGRESS NOTE ADULT - SUBJECTIVE AND OBJECTIVE BOX
Follow-up Pulm Progress Note  Brayan Verma MD  [phone]    No new respiratory events overnight.    Breathing comfortably supine on room air, sleeping at time of visit      Vital Signs Last 24 Hrs  T(C): 36.5 (05 Nov 2021 05:09), Max: 36.6 (04 Nov 2021 12:00)  T(F): 97.7 (05 Nov 2021 05:09), Max: 97.9 (04 Nov 2021 12:00)  HR: 81 (05 Nov 2021 05:09) (81 - 111)  BP: 140/85 (05 Nov 2021 05:09) (112/55 - 167/74)  BP(mean): 106 (04 Nov 2021 17:00) (79 - 106)  RR: 18 (05 Nov 2021 05:09) (14 - 20)  SpO2: 94% (05 Nov 2021 05:09) (89% - 97%)                        7.3    10.64 )-----------( 180      ( 05 Nov 2021 07:05 )             23.4       11-05    138  |  97  |  60<H>  ----------------------------<  138<H>  4.0   |  23  |  5.57<H>    Ca    9.1      05 Nov 2021 07:26  Phos  4.4     11-05  Mg     2.4     11-05    TPro  6.9  /  Alb  2.7<L>  /  TBili  1.3<H>  /  DBili  x   /  AST  33  /  ALT  6<L>  /  AlkPhos  139<H>  11-05    Physical Examination:  PULM: Diminished basilar BS  CVS: Regular rate and rhythm, no murmurs, rubs, or gallops  ABD: Soft, non-tender  EXT:  No clubbing, cyanosis, or edema    RADIOLOGY REVIEWED  CXR:    CT chest:    TTE:

## 2021-11-05 NOTE — PROGRESS NOTE ADULT - SUBJECTIVE AND OBJECTIVE BOX
NEPHROLOGY-[facility] [phone]        Patient seen and examined in bed.  He was about the same         MEDICATIONS  (STANDING):  ascorbic acid 500 milliGRAM(s) Oral daily  atorvastatin 80 milliGRAM(s) Oral at bedtime  carbidopa/levodopa  25/100 2.5 Tablet(s) Oral <User Schedule>  carbidopa/levodopa  25/100 2 Tablet(s) Oral <User Schedule>  cholecalciferol 1000 Unit(s) Oral daily  dextrose 40% Gel 15 Gram(s) Oral once  dextrose 5%. 1000 milliLiter(s) (50 mL/Hr) IV Continuous <Continuous>  dextrose 5%. 1000 milliLiter(s) (100 mL/Hr) IV Continuous <Continuous>  dextrose 50% Injectable 25 Gram(s) IV Push once  dextrose 50% Injectable 12.5 Gram(s) IV Push once  dextrose 50% Injectable 25 Gram(s) IV Push once  epoetin mari-epbx (RETACRIT) Injectable 62290 Unit(s) IV Push once  folic acid 1 milliGRAM(s) Oral daily  glucagon  Injectable 1 milliGRAM(s) IntraMuscular once  insulin glargine Injectable (LANTUS) 6 Unit(s) SubCutaneous at bedtime  insulin lispro (ADMELOG) corrective regimen sliding scale   SubCutaneous every 6 hours  levothyroxine 25 MICROGram(s) Oral daily  metoprolol tartrate 25 milliGRAM(s) Oral two times a day  midodrine 20 milliGRAM(s) Oral <User Schedule>  polyethylene glycol 3350 17 Gram(s) Oral two times a day  QUEtiapine 25 milliGRAM(s) Oral at bedtime  senna Syrup 10 milliLiter(s) Oral at bedtime  sevelamer carbonate 1600 milliGRAM(s) Oral three times a day  trihexyphenidyl 2 milliGRAM(s) Oral three times a day      VITAL:  T(C): , Max: 36.6 (11-04-21 @ 12:00)  T(F): , Max: 97.9 (11-04-21 @ 12:00)  HR: 81 (11-05-21 @ 05:09)  BP: 140/85 (11-05-21 @ 05:09)  BP(mean): 106 (11-04-21 @ 17:00)  RR: 18 (11-05-21 @ 05:09)  SpO2: 94% (11-05-21 @ 05:09)  Wt(kg): --    I and O's:        PHYSICAL EXAM:    Constitutional: NAD  Neck:  No JVD  Respiratory: CTAB/L  Cardiovascular: S1 and S2  Gastrointestinal: BS+, soft, NT/ND  Extremities: No peripheral edema  Neurological: A/O x 3, no focal deficits  Psychiatric: Normal mood, normal affect  : No Javier  Skin: No rashes  Access: [facility]     LABS:                        7.3    10.64 )-----------( 180      ( 05 Nov 2021 07:05 )             23.4     11-05    138  |  97  |  60<H>  ----------------------------<  138<H>  4.0   |  23  |  5.57<H>    Ca    9.1      05 Nov 2021 07:26  Phos  4.4     11-05  Mg     2.4     11-05    TPro  6.9  /  Alb  2.7<L>  /  TBili  1.3<H>  /  DBili  x   /  AST  33  /  ALT  6<L>  /  AlkPhos  139<H>  11-05          Urine Studies:          RADIOLOGY & ADDITIONAL STUDIES:

## 2021-11-05 NOTE — PROGRESS NOTE ADULT - PROBLEM SELECTOR PLAN 3
Will start Tylenol 650mg PO q 6 ATC x 2 days.   If recurrent symptoms, may consider Gabapentin but dosing it for renal impairment.   Leg elevation.   Wound care For now, will try to avoid opioids due to Delirium.   Will start Tylenol 650mg PO q 6 ATC x 2 days.   If recurrent symptoms, may consider Gabapentin but dosing it for renal impairment.   Leg elevation.   Wound care

## 2021-11-05 NOTE — PROGRESS NOTE ADULT - PROBLEM SELECTOR PLAN 1
Will continue current insulin regimen for now. Will continue monitoring FS, log, and FU.  Patient previously on larger dose insulin (Tresiba and Novolog), will continue monitoring and FU DC recommendations.   for compliance with consistent low carb diet.

## 2021-11-06 NOTE — PROGRESS NOTE ADULT - SUBJECTIVE AND OBJECTIVE BOX
Chief complaint    Patient is a 71y old  Male who presents with a chief complaint of leg swelling (05 Nov 2021 17:19)   Review of systems  Patient in bed, appears comfortable.    Labs and Fingersticks  CAPILLARY BLOOD GLUCOSE      POCT Blood Glucose.: 127 mg/dL (06 Nov 2021 07:47)  POCT Blood Glucose.: 141 mg/dL (05 Nov 2021 21:37)  POCT Blood Glucose.: 117 mg/dL (05 Nov 2021 17:09)      Anion Gap, Serum: 16 (11-06 @ 05:41)  Anion Gap, Serum: 18 *H* (11-05 @ 07:26)      Calcium, Total Serum: 9.0 (11-06 @ 05:41)  Calcium, Total Serum: 9.1 (11-05 @ 07:26)  Albumin, Serum: 2.7 *L* (11-05 @ 07:26)    Alanine Aminotransferase (ALT/SGPT): 6 *L* (11-05 @ 07:26)  Alkaline Phosphatase, Serum: 139 *H* (11-05 @ 07:26)  Aspartate Aminotransferase (AST/SGOT): 33 (11-05 @ 07:26)        11-06    138  |  98  |  43<H>  ----------------------------<  114<H>  4.0   |  24  |  4.37<H>    Ca    9.0      06 Nov 2021 05:41  Phos  4.4     11-05  Mg     2.4     11-05    TPro  6.9  /  Alb  2.7<L>  /  TBili  1.3<H>  /  DBili  x   /  AST  33  /  ALT  6<L>  /  AlkPhos  139<H>  11-05                        7.9    10.43 )-----------( 193      ( 06 Nov 2021 05:41 )             25.5     Medications  MEDICATIONS  (STANDING):  acetaminophen     Tablet .. 650 milliGRAM(s) Oral every 6 hours  ascorbic acid 500 milliGRAM(s) Oral daily  atorvastatin 80 milliGRAM(s) Oral at bedtime  carbidopa/levodopa  25/100 2.5 Tablet(s) Oral <User Schedule>  carbidopa/levodopa  25/100 2 Tablet(s) Oral <User Schedule>  chlorhexidine 4% Liquid 1 Application(s) Topical <User Schedule>  cholecalciferol 1000 Unit(s) Oral daily  dextrose 40% Gel 15 Gram(s) Oral once  dextrose 5%. 1000 milliLiter(s) (50 mL/Hr) IV Continuous <Continuous>  dextrose 5%. 1000 milliLiter(s) (100 mL/Hr) IV Continuous <Continuous>  dextrose 50% Injectable 25 Gram(s) IV Push once  dextrose 50% Injectable 12.5 Gram(s) IV Push once  dextrose 50% Injectable 25 Gram(s) IV Push once  folic acid 1 milliGRAM(s) Oral daily  glucagon  Injectable 1 milliGRAM(s) IntraMuscular once  insulin glargine Injectable (LANTUS) 6 Unit(s) SubCutaneous at bedtime  insulin lispro (ADMELOG) corrective regimen sliding scale   SubCutaneous three times a day before meals  insulin lispro (ADMELOG) corrective regimen sliding scale   SubCutaneous at bedtime  levothyroxine 25 MICROGram(s) Oral daily  metoprolol tartrate 25 milliGRAM(s) Oral two times a day  midodrine 20 milliGRAM(s) Oral <User Schedule>  polyethylene glycol 3350 17 Gram(s) Oral two times a day  QUEtiapine 25 milliGRAM(s) Oral at bedtime  senna Syrup 10 milliLiter(s) Oral at bedtime  sevelamer carbonate 1600 milliGRAM(s) Oral three times a day  trihexyphenidyl 2 milliGRAM(s) Oral three times a day      Physical Exam  General: Patient comfortable in bed  Vital Signs Last 12 Hrs  T(F): 97.9 (11-06-21 @ 04:22), Max: 97.9 (11-06-21 @ 04:22)  HR: 107 (11-06-21 @ 04:22) (107 - 107)  BP: 132/85 (11-06-21 @ 04:22) (132/85 - 132/85)  BP(mean): --  RR: 18 (11-06-21 @ 04:22) (18 - 18)  SpO2: 93% (11-06-21 @ 04:22) (93% - 93%)  Neck: No palpable thyroid nodules.  CVS: S1S2, No murmurs  Respiratory: No wheezing, no crepitations  GI: Abdomen soft, bowel sounds positive  Musculoskeletal:  edema lower extremities.     Diagnostics

## 2021-11-06 NOTE — PROGRESS NOTE ADULT - ASSESSMENT
Assessment  DMT2: 71y Male with DM T2 with hyperglycemia, A1C 6.4%, was on insulin at home, now on low-dose basal insulin and coverage, blood sugars are  trending within acceptable range, no hypoglycemic episodes,  eating meals, appears comfortable.  Hypothyroidism: Patient has no history thyroid disease, was not on any thyroid supplements, subclinical with low-normal FT4, lethargic, now on synthroid 25 mcg po daily.  CHF: on medications, stable, monitored.  HTN: Controlled,  on antihypertensive medications.  Parkinsons: on meds, monitored.  CKD: Monitor labs/BMP      Kelsie Reece MD  Cell: [phone]  Office: [phone]

## 2021-11-06 NOTE — PROGRESS NOTE ADULT - SUBJECTIVE AND OBJECTIVE BOX
Patient is a 71y old  Male who presents with a chief complaint of leg swelling (06 Nov 2021 10:06)      INTERVAL HPI/OVERNIGHT EVENTS: noted  pt seen and examined this am   events noted  LE pain below the hips      Vital Signs Last 24 Hrs  T(C): 36.6 (06 Nov 2021 11:03), Max: 37 (05 Nov 2021 13:10)  T(F): 97.9 (06 Nov 2021 11:03), Max: 98.6 (05 Nov 2021 13:10)  HR: 86 (06 Nov 2021 11:03) (81 - 107)  BP: 164/96 (06 Nov 2021 11:03) (132/85 - 164/96)  BP(mean): --  RR: 18 (06 Nov 2021 11:03) (16 - 18)  SpO2: 96% (06 Nov 2021 11:03) (93% - 96%)    acetaminophen     Tablet .. 650 milliGRAM(s) Oral every 6 hours PRN  acetaminophen     Tablet .. 650 milliGRAM(s) Oral every 6 hours  albuterol/ipratropium for Nebulization 3 milliLiter(s) Nebulizer every 6 hours PRN  ascorbic acid 500 milliGRAM(s) Oral daily  atorvastatin 80 milliGRAM(s) Oral at bedtime  carbidopa/levodopa  25/100 2.5 Tablet(s) Oral <User Schedule>  carbidopa/levodopa  25/100 2 Tablet(s) Oral <User Schedule>  chlorhexidine 4% Liquid 1 Application(s) Topical <User Schedule>  cholecalciferol 1000 Unit(s) Oral daily  dextrose 40% Gel 15 Gram(s) Oral once  dextrose 5%. 1000 milliLiter(s) IV Continuous <Continuous>  dextrose 5%. 1000 milliLiter(s) IV Continuous <Continuous>  dextrose 50% Injectable 25 Gram(s) IV Push once  dextrose 50% Injectable 12.5 Gram(s) IV Push once  dextrose 50% Injectable 25 Gram(s) IV Push once  folic acid 1 milliGRAM(s) Oral daily  glucagon  Injectable 1 milliGRAM(s) IntraMuscular once  guaiFENesin Oral Liquid (Sugar-Free) 200 milliGRAM(s) Oral every 6 hours PRN  insulin glargine Injectable (LANTUS) 6 Unit(s) SubCutaneous at bedtime  insulin lispro (ADMELOG) corrective regimen sliding scale   SubCutaneous three times a day before meals  insulin lispro (ADMELOG) corrective regimen sliding scale   SubCutaneous at bedtime  levothyroxine 25 MICROGram(s) Oral daily  metoprolol tartrate 25 milliGRAM(s) Oral two times a day  midodrine 20 milliGRAM(s) Oral <User Schedule>  polyethylene glycol 3350 17 Gram(s) Oral two times a day  QUEtiapine 25 milliGRAM(s) Oral at bedtime  senna Syrup 10 milliLiter(s) Oral at bedtime  sevelamer carbonate 1600 milliGRAM(s) Oral three times a day  sodium chloride 0.9% lock flush 10 milliLiter(s) IV Push every 1 hour PRN  trihexyphenidyl 2 milliGRAM(s) Oral three times a day      PHYSICAL EXAM:  GENERAL: NAD,   EYES: conjunctiva and sclera clear  ENMT: Moist mucous membranes  NECK: Supple, No JVD, Normal thyroid  CHEST/LUNG: non labored, cta b/l  HEART: Regular rate and rhythm; No murmurs, rubs, or gallops  ABDOMEN: Soft, Nontender, Nondistended; Bowel sounds present  EXTREMITIES:  2+ Peripheral Pulses, No clubbing, cyanosis, or edema  LYMPH: No lymphadenopathy noted  SKIN: No rashes or lesions    Consultant(s) Notes Reviewed:  [x ] YES  [ ] NO  Care Discussed with Consultants/Other Providers [ x] YES  [ ] NO    LABS:                        7.9    10.43 )-----------( 193      ( 06 Nov 2021 05:41 )             25.5     11-06    138  |  98  |  43<H>  ----------------------------<  114<H>  4.0   |  24  |  4.37<H>    Ca    9.0      06 Nov 2021 05:41  Phos  4.4     11-05  Mg     2.4     11-05    TPro  6.9  /  Alb  2.7<L>  /  TBili  1.3<H>  /  DBili  x   /  AST  33  /  ALT  6<L>  /  AlkPhos  139<H>  11-05        CAPILLARY BLOOD GLUCOSE      POCT Blood Glucose.: 121 mg/dL (06 Nov 2021 11:44)  POCT Blood Glucose.: 127 mg/dL (06 Nov 2021 07:47)  POCT Blood Glucose.: 141 mg/dL (05 Nov 2021 21:37)  POCT Blood Glucose.: 117 mg/dL (05 Nov 2021 17:09)              RADIOLOGY & ADDITIONAL TESTS:    Imaging Personally Reviewed:  [x ] YES  [ ] NO

## 2021-11-07 NOTE — PROGRESS NOTE ADULT - ASSESSMENT
Assessment  DMT2: 71y Male with DM T2 with hyperglycemia, A1C 6.4%, was on insulin at home, now on low-dose basal insulin and coverage, blood sugars are stable and trending within acceptable range, no hypoglycemic episodes, eating meals, appears comfortable, confused.  Hypothyroidism: Patient has no history thyroid disease, was not on any thyroid supplements, subclinical with low-normal FT4, lethargic, now on synthroid 25 mcg po daily.  CHF: on medications, stable, monitored.  HTN: Controlled,  on antihypertensive medications.  Parkinsons: on meds, monitored.  CKD: Monitor labs/BMP      Kelsie Reece MD  Cell: [phone]  Office: [phone]     Assessment  DMT2: 71y Male with DM T2 with hyperglycemia, A1C 6.4%, was on insulin at home, now on low-dose basal insulin and coverage, blood sugars  are stable and trending within acceptable range, no hypoglycemic episodes, eating meals, appears comfortable, confused.  Hypothyroidism: Patient has no history thyroid disease, was not on any thyroid supplements, subclinical with low-normal FT4, lethargic, now on synthroid 25 mcg po daily.  CHF: on medications, stable, monitored.  HTN: Controlled,  on antihypertensive medications.  Parkinsons: on meds, monitored.  CKD: Monitor labs/BMP      Kelsie Reece MD  Cell: [phone]  Office: [phone]

## 2021-11-07 NOTE — PROGRESS NOTE ADULT - SUBJECTIVE AND OBJECTIVE BOX
Chief complaint  Patient is a 71y old  Male who presents with a chief complaint of leg swelling (07 Nov 2021 13:47)   Review of systems  Patient in bed, looks comfortable, no hypoglycemic episodes.    Labs and Fingersticks  CAPILLARY BLOOD GLUCOSE      POCT Blood Glucose.: 119 mg/dL (07 Nov 2021 11:41)  POCT Blood Glucose.: 151 mg/dL (07 Nov 2021 07:43)  POCT Blood Glucose.: 167 mg/dL (06 Nov 2021 21:27)  POCT Blood Glucose.: 152 mg/dL (06 Nov 2021 20:17)  POCT Blood Glucose.: 147 mg/dL (06 Nov 2021 16:35)      Anion Gap, Serum: 17 (11-07 @ 06:24)  Anion Gap, Serum: 16 (11-06 @ 05:41)      Calcium, Total Serum: 9.3 (11-07 @ 06:24)  Calcium, Total Serum: 9.0 (11-06 @ 05:41)  Albumin, Serum: 2.7 *L* (11-07 @ 06:24)    Alanine Aminotransferase (ALT/SGPT): 6 *L* (11-07 @ 06:24)  Alkaline Phosphatase, Serum: 118 (11-07 @ 06:24)  Aspartate Aminotransferase (AST/SGOT): 25 (11-07 @ 06:24)        11-07    135  |  96  |  58<H>  ----------------------------<  114<H>  4.4   |  22  |  5.90<H>    Ca    9.3      07 Nov 2021 06:24    TPro  7.0  /  Alb  2.7<L>  /  TBili  1.1  /  DBili  0.4<H>  /  AST  25  /  ALT  6<L>  /  AlkPhos  118  11-07                        8.4    13.02 )-----------( 245      ( 07 Nov 2021 06:25 )             27.2     Medications  MEDICATIONS  (STANDING):  ascorbic acid 500 milliGRAM(s) Oral daily  atorvastatin 80 milliGRAM(s) Oral at bedtime  carbidopa/levodopa  25/100 2.5 Tablet(s) Oral <User Schedule>  carbidopa/levodopa  25/100 2 Tablet(s) Oral <User Schedule>  chlorhexidine 4% Liquid 1 Application(s) Topical <User Schedule>  cholecalciferol 1000 Unit(s) Oral daily  dextrose 40% Gel 15 Gram(s) Oral once  dextrose 5%. 1000 milliLiter(s) (50 mL/Hr) IV Continuous <Continuous>  dextrose 5%. 1000 milliLiter(s) (100 mL/Hr) IV Continuous <Continuous>  dextrose 50% Injectable 25 Gram(s) IV Push once  dextrose 50% Injectable 12.5 Gram(s) IV Push once  dextrose 50% Injectable 25 Gram(s) IV Push once  folic acid 1 milliGRAM(s) Oral daily  glucagon  Injectable 1 milliGRAM(s) IntraMuscular once  insulin glargine Injectable (LANTUS) 6 Unit(s) SubCutaneous at bedtime  insulin lispro (ADMELOG) corrective regimen sliding scale   SubCutaneous three times a day before meals  insulin lispro (ADMELOG) corrective regimen sliding scale   SubCutaneous at bedtime  levothyroxine 25 MICROGram(s) Oral daily  metoprolol tartrate 25 milliGRAM(s) Oral two times a day  midodrine 20 milliGRAM(s) Oral <User Schedule>  polyethylene glycol 3350 17 Gram(s) Oral two times a day  QUEtiapine 25 milliGRAM(s) Oral at bedtime  senna Syrup 10 milliLiter(s) Oral at bedtime  sevelamer carbonate 1600 milliGRAM(s) Oral three times a day  trihexyphenidyl 2 milliGRAM(s) Oral three times a day      Physical Exam  General: Patient comfortable in bed  Vital Signs Last 12 Hrs  T(F): 98.2 (11-07-21 @ 10:55), Max: 99 (11-07-21 @ 04:27)  HR: 73 (11-07-21 @ 10:55) (73 - 110)  BP: 135/67 (11-07-21 @ 10:55) (135/67 - 140/81)  BP(mean): --  RR: 18 (11-07-21 @ 10:55) (18 - 18)  SpO2: 97% (11-07-21 @ 10:55) (97% - 98%)  Neck: No palpable thyroid nodules.  CVS: S1S2, No murmurs  Respiratory: No wheezing, no crepitations  GI: Abdomen soft, bowel sounds positive  Musculoskeletal:  edema lower extremities.   Skin: No skin rashes, no ecchymosis    Diagnostics    Free Thyroxine, Serum: AM Sched. Collection: 06-Nov-2021 06:00 (11-05 @ 13:18)  Free Thyroxine, Serum: AM Sched. Collection: 26-Oct-2021 06:00 (10-25 @ 11:45)  Thyroid Stimulating Hormone, Serum: AM Sched. Collection: 26-Oct-2021 06:00 (10-25 @ 11:45)  Cortisol AM, Serum: 08:00 (10-22 @ 12:47)  Free Thyroxine, Serum: AM Sched. Collection: 23-Oct-2021 06:00 (10-22 @ 12:43)  Thyroid Stimulating Hormone, Serum: AM Sched. Collection: 23-Oct-2021 06:00 (10-22 @ 12:43)               Chief complaint  Patient is a 71y old  Male who presents with a chief complaint of leg swelling (07 Nov 2021 13:47)   Review of systems  Patient in bed, looks comfortable, no hypoglycemic episodes.    Labs and Fingersticks  CAPILLARY BLOOD GLUCOSE      POCT Blood Glucose.: 119 mg/dL (07 Nov 2021 11:41)  POCT Blood Glucose.: 151 mg/dL (07 Nov 2021 07:43)  POCT Blood Glucose.: 167 mg/dL (06 Nov 2021 21:27)  POCT Blood Glucose.: 152 mg/dL (06 Nov 2021 20:17)  POCT Blood Glucose.: 147 mg/dL (06 Nov 2021 16:35)      Anion Gap, Serum: 17 (11-07 @ 06:24)  Anion Gap, Serum: 16 (11-06 @ 05:41)      Calcium, Total Serum: 9.3 (11-07 @ 06:24)  Calcium, Total Serum: 9.0 (11-06 @ 05:41)  Albumin, Serum: 2.7 *L* (11-07 @ 06:24)    Alanine Aminotransferase (ALT/SGPT): 6 *L* (11-07 @ 06:24)  Alkaline Phosphatase, Serum: 118 (11-07 @ 06:24)  Aspartate Aminotransferase (AST/SGOT): 25 (11-07 @ 06:24)        11-07    135  |  96  |  58<H>  ----------------------------<  114<H>  4.4   |  22  |  5.90<H>    Ca    9.3      07 Nov 2021 06:24    TPro  7.0  /  Alb  2.7<L>  /  TBili  1.1  /  DBili  0.4<H>  /  AST  25  /  ALT  6<L>  /  AlkPhos  118  11-07                        8.4    13.02 )-----------( 245      ( 07 Nov 2021 06:25 )             27.2     Medications  MEDICATIONS  (STANDING):  ascorbic acid 500 milliGRAM(s) Oral daily  atorvastatin 80 milliGRAM(s) Oral at bedtime  carbidopa/levodopa  25/100 2.5 Tablet(s) Oral <User Schedule>  carbidopa/levodopa  25/100 2 Tablet(s) Oral <User Schedule>  chlorhexidine 4% Liquid 1 Application(s) Topical <User Schedule>  cholecalciferol 1000 Unit(s) Oral daily  dextrose 40% Gel 15 Gram(s) Oral once  dextrose 5%. 1000 milliLiter(s) (50 mL/Hr) IV Continuous <Continuous>  dextrose 5%. 1000 milliLiter(s) (100 mL/Hr) IV Continuous <Continuous>  dextrose 50% Injectable 25 Gram(s) IV Push once  dextrose 50% Injectable 12.5 Gram(s) IV Push once  dextrose 50% Injectable 25 Gram(s) IV Push once  folic acid 1 milliGRAM(s) Oral daily  glucagon  Injectable 1 milliGRAM(s) IntraMuscular once  insulin glargine Injectable (LANTUS) 6 Unit(s) SubCutaneous at bedtime  insulin lispro (ADMELOG) corrective regimen sliding scale   SubCutaneous three times a day before meals  insulin lispro (ADMELOG) corrective regimen sliding scale   SubCutaneous at bedtime  levothyroxine 25 MICROGram(s) Oral daily  metoprolol tartrate 25 milliGRAM(s) Oral two times a day  midodrine 20 milliGRAM(s) Oral <User Schedule>  polyethylene glycol 3350 17 Gram(s) Oral two times a day  QUEtiapine 25 milliGRAM(s) Oral at bedtime  senna Syrup 10 milliLiter(s) Oral at bedtime  sevelamer carbonate 1600 milliGRAM(s) Oral three times a day  trihexyphenidyl 2 milliGRAM(s) Oral three times a day      Physical Exam  General: Patient comfortable in bed  Vital Signs Last 12 Hrs  T(F): 98.2 (11-07-21 @ 10:55), Max: 99 (11-07-21 @ 04:27)  HR: 73 (11-07-21 @ 10:55) (73 - 110)  BP: 135/67 (11-07-21 @ 10:55) (135/67 - 140/81)  BP(mean): --  RR: 18 (11-07-21 @ 10:55) (18 - 18)  SpO2: 97% (11-07-21 @ 10:55) (97% - 98%)  Neck: No palpable thyroid nodules.  CVS: S1S2, No murmurs  Respiratory: No wheezing, no crepitations  GI: Abdomen soft, bowel sounds positive  Musculoskeletal:  edema lower extremities.   Skin: No skin rashes, no ecchymosis    Diagnostics    Free Thyroxine, Serum: AM Sched. Collection: 06-Nov-2021 06:00 (11-05 @ 13:18)  Free Thyroxine, Serum: AM Sched. Collection: 26-Oct-2021 06:00 (10-25 @ 11:45)  Thyroid Stimulating Hormone, Serum: AM Sched. Collection: 26-Oct-2021 06:00 (10-25 @ 11:45)  Cortisol AM, Serum: 08:00 (10-22 @ 12:47)  Free Thyroxine, Serum: AM Sched. Collection: 23-Oct-2021 06:00 (10-22 @ 12:43)  Thyroid Stimulating Hormone, Serum: AM Sched. Collection: 23-Oct-2021 06:00 (10-22 @ 12:43)

## 2021-11-07 NOTE — PROGRESS NOTE ADULT - SUBJECTIVE AND OBJECTIVE BOX
Patient is a 71y old  Male who presents with a chief complaint of leg swelling (06 Nov 2021 12:06)      INTERVAL HPI/OVERNIGHT EVENTS: noted  pt seen and examined this am   events noted  c/o pain legs, abd pain  tolerating po well  last bm yesterday      Vital Signs Last 24 Hrs  T(C): 36.8 (07 Nov 2021 10:55), Max: 37.2 (07 Nov 2021 04:27)  T(F): 98.2 (07 Nov 2021 10:55), Max: 99 (07 Nov 2021 04:27)  HR: 73 (07 Nov 2021 10:55) (73 - 110)  BP: 135/67 (07 Nov 2021 10:55) (133/75 - 158/67)  BP(mean): --  RR: 18 (07 Nov 2021 10:55) (18 - 19)  SpO2: 97% (07 Nov 2021 10:55) (97% - 98%)    acetaminophen     Tablet .. 650 milliGRAM(s) Oral every 6 hours PRN  acetaminophen     Tablet .. 650 milliGRAM(s) Oral every 6 hours  albuterol/ipratropium for Nebulization 3 milliLiter(s) Nebulizer every 6 hours PRN  ascorbic acid 500 milliGRAM(s) Oral daily  atorvastatin 80 milliGRAM(s) Oral at bedtime  carbidopa/levodopa  25/100 2.5 Tablet(s) Oral <User Schedule>  carbidopa/levodopa  25/100 2 Tablet(s) Oral <User Schedule>  chlorhexidine 4% Liquid 1 Application(s) Topical <User Schedule>  cholecalciferol 1000 Unit(s) Oral daily  dextrose 40% Gel 15 Gram(s) Oral once  dextrose 5%. 1000 milliLiter(s) IV Continuous <Continuous>  dextrose 5%. 1000 milliLiter(s) IV Continuous <Continuous>  dextrose 50% Injectable 25 Gram(s) IV Push once  dextrose 50% Injectable 12.5 Gram(s) IV Push once  dextrose 50% Injectable 25 Gram(s) IV Push once  folic acid 1 milliGRAM(s) Oral daily  glucagon  Injectable 1 milliGRAM(s) IntraMuscular once  guaiFENesin Oral Liquid (Sugar-Free) 200 milliGRAM(s) Oral every 6 hours PRN  insulin glargine Injectable (LANTUS) 6 Unit(s) SubCutaneous at bedtime  insulin lispro (ADMELOG) corrective regimen sliding scale   SubCutaneous three times a day before meals  insulin lispro (ADMELOG) corrective regimen sliding scale   SubCutaneous at bedtime  levothyroxine 25 MICROGram(s) Oral daily  metoprolol tartrate 25 milliGRAM(s) Oral two times a day  midodrine 20 milliGRAM(s) Oral <User Schedule>  polyethylene glycol 3350 17 Gram(s) Oral two times a day  QUEtiapine 25 milliGRAM(s) Oral at bedtime  senna Syrup 10 milliLiter(s) Oral at bedtime  sevelamer carbonate 1600 milliGRAM(s) Oral three times a day  sodium chloride 0.9% lock flush 10 milliLiter(s) IV Push every 1 hour PRN  trihexyphenidyl 2 milliGRAM(s) Oral three times a day      PHYSICAL EXAM:  GENERAL: NAD,   EYES: conjunctiva and sclera clear  ENMT: Moist mucous membranes  NECK: Supple, No JVD, Normal thyroid  CHEST/LUNG: non labored, cta b/l  HEART: Regular rate and rhythm; No murmurs, rubs, or gallops  ABDOMEN: Soft, Nontender, mild tender lt side, distended; Bowel sounds present  EXTREMITIES:  2+ Peripheral Pulses, No clubbing, cyanosis, or edema  LYMPH: No lymphadenopathy noted  SKIN: No rashes or lesions    Consultant(s) Notes Reviewed:  [x ] YES  [ ] NO  Care Discussed with Consultants/Other Providers [ x] YES  [ ] NO    LABS:                        8.4    13.02 )-----------( 245      ( 07 Nov 2021 06:25 )             27.2     11-07    135  |  96  |  58<H>  ----------------------------<  114<H>  4.4   |  22  |  5.90<H>    Ca    9.3      07 Nov 2021 06:24          CAPILLARY BLOOD GLUCOSE      POCT Blood Glucose.: 151 mg/dL (07 Nov 2021 07:43)  POCT Blood Glucose.: 167 mg/dL (06 Nov 2021 21:27)  POCT Blood Glucose.: 152 mg/dL (06 Nov 2021 20:17)  POCT Blood Glucose.: 147 mg/dL (06 Nov 2021 16:35)  POCT Blood Glucose.: 121 mg/dL (06 Nov 2021 11:44)              RADIOLOGY & ADDITIONAL TESTS:    Imaging Personally Reviewed:  [x ] YES  [ ] NO

## 2021-11-07 NOTE — PROGRESS NOTE ADULT - ASSESSMENT
70yo M w/ PMHx of CAD (s/p CABG 2019), CKD (unknown stage), DM2, Parkinson's Disease, HTN, depression presents with new onset acute heart failure exacerbation, NSTEMI, started on hep gtt, now with bl RP bleed, acute anemia. transferred to MICU.    # Acute systolic and diastolic heart failure  # NSTEMI  # ABBY on CKD- newly started HD  # Atrial flutter  # acute hypoxic resp failure  # hemorrhagic shock, left RP hematoma, acute blood loss anemia  TTE mod to severe LV SD, hypokinesis anterior wall, not candidate for cath at this point  HD per renal  10/28 sp IR embolization l4 and l5 lumbar artery  on midodrine,  during dialysis  sp MICU course  swallow eval- passed- monitor on regular diet  leucocytosis- monitor, afebrile  check axr    # PTT prolonged  did not correct on mixing study, heme following    # DM2 (diabetes mellitus, type 2)  per endo  hba1c 6.4    # CAD (coronary artery disease)  c/w atorvastatin  asa on hold    # Parkinsons disease   # delirium  carbidopa/levodopa restarted  c/w trihexyphenidyl  seroquel  if no improvement in MS /or worsening consider psych cs    PCP Dr. Simons 72yo M w/ PMHx of CAD (s/p CABG 2019), CKD (unknown stage), DM2, Parkinson's Disease, HTN, depression presents with new onset acute heart failure exacerbation, NSTEMI, started on hep gtt, now with bl RP bleed, acute anemia. transferred to MICU.    # Acute systolic and diastolic heart failure  # NSTEMI  # ABBY on CKD- newly started HD  # Atrial flutter  # acute hypoxic resp failure  # hemorrhagic shock, left RP hematoma, acute blood loss anemia  TTE mod to severe LV SD, hypokinesis anterior wall, not candidate for cath at this point  HD per renal  10/28 sp IR embolization l4 and l5 lumbar artery  on midodrine,  during dialysis  sp MICU course  swallow eval- passed- monitor on regular diet  leucocytosis- monitor, afebrile  check axr  elev lfts- post shock- will check cmp today  if lfts rising will obtain ruq sono    # PTT prolonged  did not correct on mixing study, heme following    # DM2 (diabetes mellitus, type 2)  per endo  hba1c 6.4    # CAD (coronary artery disease)  c/w atorvastatin  asa on hold    # Parkinsons disease   # delirium  carbidopa/levodopa restarted  c/w trihexyphenidyl  seroquel  if no improvement in MS /or worsening consider psych cs    PCP Dr. Simons

## 2021-11-07 NOTE — PROGRESS NOTE ADULT - ASSESSMENT
ASSESSMENT/PLAN  70yo M w/ PMHx of CAD (s/p CABG 2019), CKD (unknown stage), DM2, Parkinson's Disease, HTN, depression presents with bilateral leg swelling  Mild CHF  Acute on chronic renal failure --getting worse   Severe LV dysfunction ;  A flutter   Confusion   Febrile        ASSESSMENT/PLAN  72yo M w/ PMHx of CAD (s/p CABG 2019), CKD (unknown stage), DM2, Parkinson's Disease, HTN, depression presents with bilateral leg swelling  Mild CHF  Acute on chronic renal failure --getting worse   Severe LV dysfunction ;  A flutter   Confusion   Febrile     1 IR-  perm cath needed and will need vasc consult for AVG this amdmission     2 Renal- Next HD tomorrow    3 CVS- BP is elevated this am (hold parameters on Midodrine when SBP> 160) and may be able to reduce the Midodrine dosing   Lopressor for heart rate control     4 Anemia - Retacrit at HD     Natividad Friedman NP-C  [facility]  [phone]        ASSESSMENT/PLAN  70yo M w/ PMHx of CAD (s/p CABG 2019), CKD (unknown stage), DM2, Parkinson's Disease, HTN, depression presents with bilateral leg swelling  Mild CHF  Acute on chronic renal failure --getting worse   Severe LV dysfunction ;  A flutter   Confusion   Febrile     1 IR-  perm cath needed and will need vasc consult for AVG this amdmission     2 Renal- Next HD tomorrow    3 CVS- BP stable, on Midodrine (with hold parameters in place, SBP> 160)  Lopressor for heart rate control     4 Anemia - Retacrit at HD     Natividad Friedman NP-C  [facility]  [phone]        ASSESSMENT/PLAN  70yo M w/ PMHx of CAD (s/p CABG 2019), CKD (unknown stage), DM2, Parkinson's Disease, HTN, depression presents with bilateral leg swelling  Mild CHF  Acute on chronic renal failure --getting worse   Severe LV dysfunction ;  A flutter   Confusion   Febrile     1 IR-  perm cath needed and will need vasc consult for AVG this amdmission     2 Renal- Last dialyzed Friday removed 1.4L. Next HD tomorrow.    3 CVS- BP stable, on Midodrine (with hold parameters in place, SBP> 160)  Lopressor for heart rate control     4 Anemia - Retacrit at HD     Natividad Friedman NP-C  [facility]  [phone]

## 2021-11-07 NOTE — PROGRESS NOTE ADULT - SUBJECTIVE AND OBJECTIVE BOX
NEPHROLOGY     Patient seen and examined.    MEDICATIONS  (STANDING):  acetaminophen     Tablet .. 650 milliGRAM(s) Oral every 6 hours  ascorbic acid 500 milliGRAM(s) Oral daily  atorvastatin 80 milliGRAM(s) Oral at bedtime  carbidopa/levodopa  25/100 2.5 Tablet(s) Oral <User Schedule>  carbidopa/levodopa  25/100 2 Tablet(s) Oral <User Schedule>  chlorhexidine 4% Liquid 1 Application(s) Topical <User Schedule>  cholecalciferol 1000 Unit(s) Oral daily  dextrose 40% Gel 15 Gram(s) Oral once  dextrose 5%. 1000 milliLiter(s) (50 mL/Hr) IV Continuous <Continuous>  dextrose 5%. 1000 milliLiter(s) (100 mL/Hr) IV Continuous <Continuous>  dextrose 50% Injectable 25 Gram(s) IV Push once  dextrose 50% Injectable 12.5 Gram(s) IV Push once  dextrose 50% Injectable 25 Gram(s) IV Push once  folic acid 1 milliGRAM(s) Oral daily  glucagon  Injectable 1 milliGRAM(s) IntraMuscular once  insulin glargine Injectable (LANTUS) 6 Unit(s) SubCutaneous at bedtime  insulin lispro (ADMELOG) corrective regimen sliding scale   SubCutaneous three times a day before meals  insulin lispro (ADMELOG) corrective regimen sliding scale   SubCutaneous at bedtime  levothyroxine 25 MICROGram(s) Oral daily  metoprolol tartrate 25 milliGRAM(s) Oral two times a day  midodrine 20 milliGRAM(s) Oral <User Schedule>  polyethylene glycol 3350 17 Gram(s) Oral two times a day  QUEtiapine 25 milliGRAM(s) Oral at bedtime  senna Syrup 10 milliLiter(s) Oral at bedtime  sevelamer carbonate 1600 milliGRAM(s) Oral three times a day  trihexyphenidyl 2 milliGRAM(s) Oral three times a day    VITALS:  T(C): , Max: 37.2 (11-07-21 @ 04:27)  T(F): , Max: 99 (11-07-21 @ 04:27)  HR: 73 (11-07-21 @ 10:55)  BP: 135/67 (11-07-21 @ 10:55)  RR: 18 (11-07-21 @ 10:55)  SpO2: 97% (11-07-21 @ 10:55)    I and O's:    11-06 @ 08:01  -  11-07 @ 07:00  --------------------------------------------------------  IN: 180 mL / OUT: 0 mL / NET: 180 mL    11-07 @ 07:01  -  11-07 @ 13:48  --------------------------------------------------------  IN: 60 mL / OUT: 100 mL / NET: -40 mL    PHYSICAL EXAM:  Constitutional: NAD  Respiratory: CTA B/L  Cardiovascular: S1 and S2, RRR  Gastrointestinal: + BS, soft, NT, ND  Extremities: No peripheral edema  Neurological: AAO x 3, CN 2-12 intact  : No Javier  Access: Not applicable    LABS:                        8.4    13.02 )-----------( 245      ( 07 Nov 2021 06:25 )             27.2     11-07    135  |  96  |  58<H>  ----------------------------<  114<H>  4.4   |  22  |  5.90<H>    Ca    9.3      07 Nov 2021 06:24    TPro  7.0  /  Alb  2.7<L>  /  TBili  1.1  /  DBili  0.4<H>  /  AST  25  /  ALT  6<L>  /  AlkPhos  118  11-07   NEPHROLOGY     Patient seen and examined. Pt resting comfortably, no complaints, no overnight events noted, currently in no acute distress.     MEDICATIONS  (STANDING):  acetaminophen     Tablet .. 650 milliGRAM(s) Oral every 6 hours  ascorbic acid 500 milliGRAM(s) Oral daily  atorvastatin 80 milliGRAM(s) Oral at bedtime  carbidopa/levodopa  25/100 2.5 Tablet(s) Oral <User Schedule>  carbidopa/levodopa  25/100 2 Tablet(s) Oral <User Schedule>  chlorhexidine 4% Liquid 1 Application(s) Topical <User Schedule>  cholecalciferol 1000 Unit(s) Oral daily  dextrose 40% Gel 15 Gram(s) Oral once  dextrose 5%. 1000 milliLiter(s) (50 mL/Hr) IV Continuous <Continuous>  dextrose 5%. 1000 milliLiter(s) (100 mL/Hr) IV Continuous <Continuous>  dextrose 50% Injectable 25 Gram(s) IV Push once  dextrose 50% Injectable 12.5 Gram(s) IV Push once  dextrose 50% Injectable 25 Gram(s) IV Push once  folic acid 1 milliGRAM(s) Oral daily  glucagon  Injectable 1 milliGRAM(s) IntraMuscular once  insulin glargine Injectable (LANTUS) 6 Unit(s) SubCutaneous at bedtime  insulin lispro (ADMELOG) corrective regimen sliding scale   SubCutaneous three times a day before meals  insulin lispro (ADMELOG) corrective regimen sliding scale   SubCutaneous at bedtime  levothyroxine 25 MICROGram(s) Oral daily  metoprolol tartrate 25 milliGRAM(s) Oral two times a day  midodrine 20 milliGRAM(s) Oral <User Schedule>  polyethylene glycol 3350 17 Gram(s) Oral two times a day  QUEtiapine 25 milliGRAM(s) Oral at bedtime  senna Syrup 10 milliLiter(s) Oral at bedtime  sevelamer carbonate 1600 milliGRAM(s) Oral three times a day  trihexyphenidyl 2 milliGRAM(s) Oral three times a day    VITALS:  T(C): , Max: 37.2 (11-07-21 @ 04:27)  T(F): , Max: 99 (11-07-21 @ 04:27)  HR: 73 (11-07-21 @ 10:55)  BP: 135/67 (11-07-21 @ 10:55)  RR: 18 (11-07-21 @ 10:55)  SpO2: 97% (11-07-21 @ 10:55)    I and O's:    11-06 @ 08:01  -  11-07 @ 07:00  --------------------------------------------------------  IN: 180 mL / OUT: 0 mL / NET: 180 mL    11-07 @ 07:01  -  11-07 @ 13:48  --------------------------------------------------------  IN: 60 mL / OUT: 100 mL / NET: -40 mL    PHYSICAL EXAM:  Constitutional: NAD  Respiratory: CTA B/L  Cardiovascular: S1 and S2, RRR  Gastrointestinal: + BS, soft, NT, ND  Extremities: No peripheral edema  Neurological: AAO x 2- 3, CN 2-12 intact  : No Javier  Access: [facility]     LABS:                        8.4    13.02 )-----------( 245      ( 07 Nov 2021 06:25 )             27.2     11-07    135  |  96  |  58<H>  ----------------------------<  114<H>  4.4   |  22  |  5.90<H>    Ca    9.3      07 Nov 2021 06:24    TPro  7.0  /  Alb  2.7<L>  /  TBili  1.1  /  DBili  0.4<H>  /  AST  25  /  ALT  6<L>  /  AlkPhos  118  11-07

## 2021-11-08 NOTE — PROGRESS NOTE ADULT - PROBLEM SELECTOR PLAN 4
HCP form is now available in the chart  1ry HCP is his son Yunier and 2ry is his daughter in law, Yoly.   GOC are for trying to improve the patient's condition, hoping his Delirium will improve and that he will regain some of his functionality. I indicated to his son that only time will determine how much his Delirium may improve and things like being at home may facilitate that improvement. When it comes to his functionality, I indicated that it may remain significantly impaired. I suggest to his son to start planning and looking for support at home (home care, HHA) because, after coming out of [facility], the patient may still have severe functional issues. His son indicated that the patient's daughter in law was already discussing with case management about resources.   I also d/w his son about code status. His son is a paramedic and familiar with this topic. I indicated that due to the patient's recent MI, h/o CAD, and protracted hospitalization that it was important for him to start d/w his family so they may be able to prepare for when the patient presents a life threatening situation. His son will think about this and continue to d/w his family.

## 2021-11-08 NOTE — PROGRESS NOTE ADULT - PROBLEM SELECTOR PLAN 5
ON DC PLEASE MAKE A REFERRAL SO THE PATIENT CAN F/U WITH THE GERIATRICS AND PALLIATIVE MEDICINE FACULTY PRACTICE Dr. Lorraine Herring or Gina Elkins. [address] [place] [address], [address]. [place] [address]. Phone [phone]  Since GOC are defined and symptoms are not intractable. will sign off.   Will sing off.       Dennis Juarez MD   Geriatrics and Palliative Care (GAP) Consult Service    of Geriatric and Palliative Medicine  [facility]      Please page the following number for clinical matters between the hours of 9 am and 5 pm from Monday through Friday : [phone]    After 5pm and on weekends, please see the contact information below:    In the event of newly developing, evolving, or worsening symptoms, please contact the Palliative Medicine team via pager (if the patient is at [facility] #[phone] or if the patient is at [facility] #[phone]) The Geriatric and Palliative Medicine service has coverage 24 hours a day/ 7 days a week to provide medical recommendations regarding symptom management needs via telephone

## 2021-11-08 NOTE — PROGRESS NOTE ADULT - SUBJECTIVE AND OBJECTIVE BOX
NEPHROLOGY-[facility] [phone]        Patient seen and examined in bed.  He was the same         MEDICATIONS  (STANDING):  ascorbic acid 500 milliGRAM(s) Oral daily  atorvastatin 80 milliGRAM(s) Oral at bedtime  carbidopa/levodopa  25/100 2.5 Tablet(s) Oral <User Schedule>  carbidopa/levodopa  25/100 2 Tablet(s) Oral <User Schedule>  chlorhexidine 4% Liquid 1 Application(s) Topical <User Schedule>  cholecalciferol 1000 Unit(s) Oral daily  dextrose 40% Gel 15 Gram(s) Oral once  dextrose 5%. 1000 milliLiter(s) (50 mL/Hr) IV Continuous <Continuous>  dextrose 5%. 1000 milliLiter(s) (100 mL/Hr) IV Continuous <Continuous>  dextrose 50% Injectable 25 Gram(s) IV Push once  dextrose 50% Injectable 12.5 Gram(s) IV Push once  dextrose 50% Injectable 25 Gram(s) IV Push once  folic acid 1 milliGRAM(s) Oral daily  glucagon  Injectable 1 milliGRAM(s) IntraMuscular once  insulin glargine Injectable (LANTUS) 6 Unit(s) SubCutaneous at bedtime  insulin lispro (ADMELOG) corrective regimen sliding scale   SubCutaneous three times a day before meals  insulin lispro (ADMELOG) corrective regimen sliding scale   SubCutaneous at bedtime  levothyroxine 25 MICROGram(s) Oral daily  metoprolol tartrate 25 milliGRAM(s) Oral two times a day  midodrine 20 milliGRAM(s) Oral <User Schedule>  polyethylene glycol 3350 17 Gram(s) Oral two times a day  QUEtiapine 25 milliGRAM(s) Oral at bedtime  senna Syrup 10 milliLiter(s) Oral at bedtime  sevelamer carbonate 1600 milliGRAM(s) Oral three times a day  trihexyphenidyl 2 milliGRAM(s) Oral three times a day      VITAL:  T(C): , Max: 36.8 (11-07-21 @ 10:55)  T(F): , Max: 98.2 (11-07-21 @ 10:55)  HR: 105 (11-08-21 @ 04:23)  BP: 150/99 (11-08-21 @ 04:23)  BP(mean): --  RR: 18 (11-08-21 @ 04:23)  SpO2: 95% (11-08-21 @ 04:23)  Wt(kg): --    I and O's:    11-07 @ 07:01  -  11-08 @ 07:00  --------------------------------------------------------  IN: 160 mL / OUT: 100 mL / NET: 60 mL          PHYSICAL EXAM:    Constitutional: NAD  Neck:  No JVD  Respiratory: CTAB/L  Cardiovascular: S1 and S2  Gastrointestinal: BS+, soft, NT/ND  Extremities: No peripheral edema  Neurological: A/O x 3, no focal deficits  Psychiatric: Normal mood, normal affect  : No Javier  Skin: No rashes  Access: [facility]     LABS:                        8.4    13.02 )-----------( 245      ( 07 Nov 2021 06:25 )             27.2     11-07    135  |  96  |  58<H>  ----------------------------<  114<H>  4.4   |  22  |  5.90<H>    Ca    9.3      07 Nov 2021 06:24    TPro  7.0  /  Alb  2.7<L>  /  TBili  1.1  /  DBili  0.4<H>  /  AST  25  /  ALT  6<L>  /  AlkPhos  118  11-07          Urine Studies:          RADIOLOGY & ADDITIONAL STUDIES:

## 2021-11-08 NOTE — PROGRESS NOTE ADULT - SUBJECTIVE AND OBJECTIVE BOX
Chief complaint  Patient is a 71y old  Male who presents with a chief complaint of leg swelling (08 Nov 2021 14:08)   Review of systems  Patient awake in bed, looks comfortable, no hypoglycemic episodes. Family at bedside.    Labs and Fingersticks  CAPILLARY BLOOD GLUCOSE      POCT Blood Glucose.: 120 mg/dL (08 Nov 2021 11:43)  POCT Blood Glucose.: 124 mg/dL (08 Nov 2021 07:43)  POCT Blood Glucose.: 149 mg/dL (07 Nov 2021 22:04)  POCT Blood Glucose.: 127 mg/dL (07 Nov 2021 16:40)      Anion Gap, Serum: 19 *H* (11-08 @ 11:50)  Anion Gap, Serum: 17 (11-07 @ 06:24)      Calcium, Total Serum: 9.4 (11-08 @ 11:50)  Calcium, Total Serum: 9.3 (11-07 @ 06:24)  Albumin, Serum: 2.8 *L* (11-08 @ 11:50)  Albumin, Serum: 2.7 *L* (11-07 @ 06:24)    Alanine Aminotransferase (ALT/SGPT): 6 *L* (11-08 @ 11:50)  Alanine Aminotransferase (ALT/SGPT): 6 *L* (11-07 @ 06:24)  Alkaline Phosphatase, Serum: 113 (11-08 @ 11:50)  Alkaline Phosphatase, Serum: 118 (11-07 @ 06:24)  Aspartate Aminotransferase (AST/SGOT): 23 (11-08 @ 11:50)  Aspartate Aminotransferase (AST/SGOT): 25 (11-07 @ 06:24)        11-08    138  |  97  |  74<H>  ----------------------------<  120<H>  4.7   |  22  |  7.04<H>    Ca    9.4      08 Nov 2021 11:50    TPro  7.2  /  Alb  2.8<L>  /  TBili  1.0  /  DBili  x   /  AST  23  /  ALT  6<L>  /  AlkPhos  113  11-08                        8.5    13.62 )-----------( 304      ( 08 Nov 2021 11:50 )             27.3     Medications  MEDICATIONS  (STANDING):  ascorbic acid 500 milliGRAM(s) Oral daily  atorvastatin 80 milliGRAM(s) Oral at bedtime  carbidopa/levodopa  25/100 2.5 Tablet(s) Oral <User Schedule>  carbidopa/levodopa  25/100 2 Tablet(s) Oral <User Schedule>  chlorhexidine 4% Liquid 1 Application(s) Topical <User Schedule>  cholecalciferol 1000 Unit(s) Oral daily  dextrose 40% Gel 15 Gram(s) Oral once  dextrose 5%. 1000 milliLiter(s) (50 mL/Hr) IV Continuous <Continuous>  dextrose 5%. 1000 milliLiter(s) (100 mL/Hr) IV Continuous <Continuous>  dextrose 50% Injectable 25 Gram(s) IV Push once  dextrose 50% Injectable 12.5 Gram(s) IV Push once  dextrose 50% Injectable 25 Gram(s) IV Push once  folic acid 1 milliGRAM(s) Oral daily  glucagon  Injectable 1 milliGRAM(s) IntraMuscular once  insulin glargine Injectable (LANTUS) 6 Unit(s) SubCutaneous at bedtime  insulin lispro (ADMELOG) corrective regimen sliding scale   SubCutaneous three times a day before meals  insulin lispro (ADMELOG) corrective regimen sliding scale   SubCutaneous at bedtime  levothyroxine 25 MICROGram(s) Oral daily  metoprolol tartrate 25 milliGRAM(s) Oral two times a day  midodrine 20 milliGRAM(s) Oral <User Schedule>  polyethylene glycol 3350 17 Gram(s) Oral two times a day  QUEtiapine 25 milliGRAM(s) Oral at bedtime  senna Syrup 10 milliLiter(s) Oral at bedtime  sevelamer carbonate 1600 milliGRAM(s) Oral three times a day  trihexyphenidyl 2 milliGRAM(s) Oral three times a day      Physical Exam  General: Patient comfortable in bed  Vital Signs Last 12 Hrs  T(F): 97.9 (11-08-21 @ 10:58), Max: 98 (11-08-21 @ 04:23)  HR: 76 (11-08-21 @ 10:58) (76 - 105)  BP: 160/69 (11-08-21 @ 10:58) (150/99 - 160/69)  BP(mean): --  RR: 18 (11-08-21 @ 10:58) (18 - 18)  SpO2: 96% (11-08-21 @ 10:58) (95% - 96%)  Neck: No palpable thyroid nodules.  CVS: S1S2, No murmurs  Respiratory: No wheezing, no crepitations  GI: Abdomen soft, bowel sounds positive  Musculoskeletal:  edema lower extremities.   Skin: No skin rashes, no ecchymosis    Diagnostics    Free Thyroxine, Serum: AM Sched. Collection: 06-Nov-2021 06:00 (11-05 @ 13:18)  Free Thyroxine, Serum: AM Sched. Collection: 26-Oct-2021 06:00 (10-25 @ 11:45)  Thyroid Stimulating Hormone, Serum: AM Sched. Collection: 26-Oct-2021 06:00 (10-25 @ 11:45)  Cortisol AM, Serum: 08:00 (10-22 @ 12:47)  Free Thyroxine, Serum: AM Sched. Collection: 23-Oct-2021 06:00 (10-22 @ 12:43)  Thyroid Stimulating Hormone, Serum: AM Sched. Collection: 23-Oct-2021 06:00 (10-22 @ 12:43)

## 2021-11-08 NOTE — PROGRESS NOTE ADULT - ASSESSMENT
ASSESSMENT/PLAN  72yo M w/ PMHx of CAD (s/p CABG 2019), CKD (unknown stage), DM2, Parkinson's Disease, HTN, depression presents with bilateral leg swelling  ESRD   Severe LV dysfunction ;  A flutter   Confusion        1 IR-  perm cath needed and will need vasc consult for AVG this admission     2 Renal- Next HD tomorrow.    3 CVS- BP stable, on Midodrine (with hold parameters in place, SBP> 160)  Lopressor for heart rate control     4 Anemia - Retacrit at HD     [facility]  [phone]

## 2021-11-08 NOTE — PROGRESS NOTE ADULT - ASSESSMENT
70yo M w/ PMHx of CAD (s/p CABG 2019), CKD (unknown stage), DM2, Parkinson's Disease, HTN, depression presents with new onset acute heart failure exacerbation, NSTEMI, started on hep gtt, now with bl RP bleed, acute anemia. transferred to MICU.    # Acute systolic and diastolic heart failure  # NSTEMI  # ABBY on CKD- newly started HD  # Atrial flutter  # acute hypoxic resp failure  # hemorrhagic shock, left RP hematoma, acute blood loss anemia  TTE mod to severe LV SD, hypokinesis anterior wall, not candidate for cath at this point  HD per renal  10/28 sp IR embolization l4 and l5 lumbar artery  on midodrine,  during dialysis  sp MICU course  swallow eval- passed- monitor on regular diet  leucocytosis- monitor, afebrile  check axr  elev lfts- post shock- will check cmp to- improved  if lfts rising will obtain ruq sono  plan for permacath placement and vascular fu for av fistula    # DM2 (diabetes mellitus, type 2)  per endo  hba1c 6.4    # CAD (coronary artery disease)  c/w atorvastatin  asa on hold    # Parkinsons disease   # delirium  carbidopa/levodopa restarted  c/w trihexyphenidyl  seroquel  if no improvement in MS /or worsening consider psych cs    PCP Dr. Simons

## 2021-11-08 NOTE — PROGRESS NOTE ADULT - ASSESSMENT
Assessment  DMT2: 71y Male with DM T2 with hyperglycemia, A1C 6.4%, was on insulin at home, now on low-dose basal insulin and coverage, blood sugars  are stable and trending within acceptable range, no hypoglycemic episodes. Patient is eating partial meals, not much appetite per family at bedside, appears comfortable in NAD, remains confused.  Hypothyroidism: Patient has no history thyroid disease, was not on any thyroid supplements, subclinical with low-normal FT4, lethargic, started on synthroid 25 mcg po daily, FT4 improved to 1.2.  CHF: on medications, stable, monitored.  HTN: Controlled,  on antihypertensive medications.  Parkinsons: on meds, monitored.  CKD: Monitor labs/BMP      Kelsie Reece MD  Cell: [phone]  Office: [phone]

## 2021-11-08 NOTE — PROGRESS NOTE ADULT - SUBJECTIVE AND OBJECTIVE BOX
Patient is a 71y old  Male who presents with a chief complaint of leg swelling (08 Nov 2021 10:43)      INTERVAL HPI/OVERNIGHT EVENTS: noted  pt seen and examined this am   events noted  feels well      Vital Signs Last 24 Hrs  T(C): 36.6 (08 Nov 2021 10:58), Max: 36.7 (08 Nov 2021 04:23)  T(F): 97.9 (08 Nov 2021 10:58), Max: 98 (08 Nov 2021 04:23)  HR: 76 (08 Nov 2021 10:58) (76 - 112)  BP: 160/69 (08 Nov 2021 10:58) (150/99 - 162/99)  BP(mean): --  RR: 18 (08 Nov 2021 10:58) (18 - 18)  SpO2: 96% (08 Nov 2021 10:58) (95% - 96%)    acetaminophen     Tablet .. 650 milliGRAM(s) Oral every 6 hours PRN  albuterol/ipratropium for Nebulization 3 milliLiter(s) Nebulizer every 6 hours PRN  ascorbic acid 500 milliGRAM(s) Oral daily  atorvastatin 80 milliGRAM(s) Oral at bedtime  carbidopa/levodopa  25/100 2.5 Tablet(s) Oral <User Schedule>  carbidopa/levodopa  25/100 2 Tablet(s) Oral <User Schedule>  chlorhexidine 4% Liquid 1 Application(s) Topical <User Schedule>  cholecalciferol 1000 Unit(s) Oral daily  dextrose 40% Gel 15 Gram(s) Oral once  dextrose 5%. 1000 milliLiter(s) IV Continuous <Continuous>  dextrose 5%. 1000 milliLiter(s) IV Continuous <Continuous>  dextrose 50% Injectable 25 Gram(s) IV Push once  dextrose 50% Injectable 12.5 Gram(s) IV Push once  dextrose 50% Injectable 25 Gram(s) IV Push once  folic acid 1 milliGRAM(s) Oral daily  glucagon  Injectable 1 milliGRAM(s) IntraMuscular once  guaiFENesin Oral Liquid (Sugar-Free) 200 milliGRAM(s) Oral every 6 hours PRN  insulin glargine Injectable (LANTUS) 6 Unit(s) SubCutaneous at bedtime  insulin lispro (ADMELOG) corrective regimen sliding scale   SubCutaneous three times a day before meals  insulin lispro (ADMELOG) corrective regimen sliding scale   SubCutaneous at bedtime  levothyroxine 25 MICROGram(s) Oral daily  metoprolol tartrate 25 milliGRAM(s) Oral two times a day  midodrine 20 milliGRAM(s) Oral <User Schedule>  polyethylene glycol 3350 17 Gram(s) Oral two times a day  QUEtiapine 25 milliGRAM(s) Oral at bedtime  senna Syrup 10 milliLiter(s) Oral at bedtime  sevelamer carbonate 1600 milliGRAM(s) Oral three times a day  sodium chloride 0.9% lock flush 10 milliLiter(s) IV Push every 1 hour PRN  trihexyphenidyl 2 milliGRAM(s) Oral three times a day      PHYSICAL EXAM:  GENERAL: NAD,   EYES: conjunctiva and sclera clear  ENMT: Moist mucous membranes  NECK: Supple, No JVD, Normal thyroid  CHEST/LUNG: non labored, cta b/l  HEART: Regular rate and rhythm; No murmurs, rubs, or gallops  ABDOMEN: Soft, Nontender, Nondistended; Bowel sounds present  EXTREMITIES:  2+ Peripheral Pulses, No clubbing, cyanosis, or edema  LYMPH: No lymphadenopathy noted  SKIN: No rashes or lesions    Consultant(s) Notes Reviewed:  [x ] YES  [ ] NO  Care Discussed with Consultants/Other Providers [ x] YES  [ ] NO    LABS:                        8.5    13.62 )-----------( 304      ( 08 Nov 2021 11:50 )             27.3     11-08    138  |  97  |  74<H>  ----------------------------<  120<H>  4.7   |  22  |  7.04<H>    Ca    9.4      08 Nov 2021 11:50    TPro  7.2  /  Alb  2.8<L>  /  TBili  1.0  /  DBili  x   /  AST  23  /  ALT  6<L>  /  AlkPhos  113  11-08        CAPILLARY BLOOD GLUCOSE      POCT Blood Glucose.: 120 mg/dL (08 Nov 2021 11:43)  POCT Blood Glucose.: 124 mg/dL (08 Nov 2021 07:43)  POCT Blood Glucose.: 149 mg/dL (07 Nov 2021 22:04)  POCT Blood Glucose.: 127 mg/dL (07 Nov 2021 16:40)              RADIOLOGY & ADDITIONAL TESTS:    Imaging Personally Reviewed:  [x ] YES  [ ] NO

## 2021-11-08 NOTE — PROGRESS NOTE ADULT - SUBJECTIVE AND OBJECTIVE BOX
Follow-up Pulm Progress Note  Brayan Verma MD  [phone]    No new respiratory events overnight.    Breathing comfortably supine on room air  Hb 8.4    Vital Signs Last 24 Hrs  T(C): 36.7 (08 Nov 2021 04:23), Max: 36.8 (07 Nov 2021 10:55)  T(F): 98 (08 Nov 2021 04:23), Max: 98.2 (07 Nov 2021 10:55)  HR: 105 (08 Nov 2021 04:23) (73 - 112)  BP: 150/99 (08 Nov 2021 04:23) (135/67 - 162/99)  BP(mean): --  RR: 18 (08 Nov 2021 04:23) (18 - 18)  SpO2: 95% (08 Nov 2021 04:23) (95% - 97%)                        8.4    13.02 )-----------( 245      ( 07 Nov 2021 06:25 )             27.2     11-07    135  |  96  |  58<H>  ----------------------------<  114<H>  4.4   |  22  |  5.90<H>    Ca    9.3      07 Nov 2021 06:24    TPro  7.0  /  Alb  2.7<L>  /  TBili  1.1  /  DBili  0.4<H>  /  AST  25  /  ALT  6<L>  /  AlkPhos  118  11-07    Physical Examination:  PULM: Diminished basilar BS  CVS: Regular rate and rhythm, no murmurs, rubs, or gallops  ABD: Soft, non-tender  EXT:  No clubbing, cyanosis, or edema    RADIOLOGY REVIEWED  CXR:    CT chest:    TTE:

## 2021-11-08 NOTE — CONSULT NOTE ADULT - ATTENDING COMMENTS
70yo M w/ PMHx of CAD (s/p CABG 2019), CKD (unknown stage), DM2, Parkinson's Disease, HTN, depression presents with bilateral leg swelling, new onset acute heart failure exacerbation, NSTEMI, started on hep gtt, now with bl RP bleed, acute anemia. transferred to MICU. Hematology consulted in the setting of prolonged PTT, not corrected on mixing study with abnormal factor XI.     #Prolonged PTT   - on admission PTT prolonged 53.2 now off heparin drip remained prolonged in the 50s   - mixing study at 2hr did not correct   - VWF, factor VIII, IX. 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 Unlikely to be the cause of bleeding   - given the above studies, consistent with inhibitor  - send lupus anticoagulant   - will continue to follow
ANA CRISTINA Ferraro MD reviewed the resident note and agree with the findings and plan

## 2021-11-08 NOTE — CONSULT NOTE ADULT - ASSESSMENT
72yo M w/ PMHx of CAD (s/p CABG 2019), CKD (unknown stage), DM2, Parkinson's Disease, HTN, depression presents with bilateral leg swelling, patient's daughter in-law at bedside Yoly Quintero ([address] [place] [address] Nurse) provides the history, the daughter reports the patient is a poor historian. ABBY on CKD, anticipate need for long term HD. Vascular consulted for AVF    - Patient is RHD, protect Left upper extremity (pink band, no IV sticks or blood draws)  - Will need bilateral upper extremity vein mapping  - Anticipate need Medical and Cardiac clearances    D/w fellow on call    Vascular   [phone] 70yo M w/ PMHx of CAD (s/p CABG 2019), CKD (unknown stage), DM2, Parkinson's Disease, HTN, depression presents with bilateral leg swelling, patient is a poor historian. ABBY on CKD, anticipate need for long term HD. Vascular consulted for AVF    - Patient is RHD, protect Left upper extremity (pink band, no IV sticks or blood draws)  - Will need bilateral upper extremity vein mapping  - Anticipate need Medical and Cardiac clearances  - Plan discussed with son and daughter in law, who are aware and in agreement with plan for AV fistula    D/w fellow on call    Vascular   [phone]

## 2021-11-08 NOTE — CONSULT NOTE ADULT - PROBLEM SELECTOR RECOMMENDATION 9
-  -elevated probnp  -fluid overloaded by examination  -tte as above- new lv dysfunction, compared to tte done @ [facility] in april 2021  -will need ischemic evaluation eventually however given acute renal failure prefer to wait until renal function is closer to baseline.  -continue iv lasix as ordered  -lopressor as ordered  -not a candidate for ACE/ARBS due to renal function  -add hydralazine and isordil as tolerated.  -monitor daily i/os  -daily weights
Will check AM cortisol and TFTs.  Will continue current insulin regimen for now. Will continue monitoring FS, log, and FU.   to increase PO intake/nutritional supplements as tolerated, FU RD recommendations.  Patient counseled for compliance with consistent low carb diet.
ANA CRISTINA Ferraro MD reviewed the resident note and agree with the findings and plan
Work up an treatment of possible causes as per the primary team.   Will also advise:   -Frequent reassurance and verbal orientation   -Family members or other familiar persons by his bedside.   -Delusions and hallucinations should be neither endorsed nor challenged.   -Physical restraints should be avoided. Alternatives to restraint use, such as constant observation (preferably by someone familiar to the patient such as a family member), may be more effective.  -PT eval and early ambulation if possible  -Move to a room with a window   -Update the calendar date in the room.   -Transfer out form ICU when possible.

## 2021-11-08 NOTE — PROGRESS NOTE ADULT - SUBJECTIVE AND OBJECTIVE BOX
[facility]-- WOUND TEAM -- FOLLOW UP NOTE  --------------------------------------------------------------------------------    24 hour events/subjective:          Diet:      ROS: General/ SKIN/ MSK/ Neuro/ GI see HPI  all other systems negative  pt unable to offer    ALLERGIES & MEDICATIONS  --------------------------------------------------------------------------------  Allergies    adhesives (Rash)  latex (Urticaria)  No Known Drug Allergies    Intolerances          STANDING INPATIENT MEDICATIONS    ascorbic acid 500 milliGRAM(s) Oral daily  atorvastatin 80 milliGRAM(s) Oral at bedtime  carbidopa/levodopa  25/100 2.5 Tablet(s) Oral <User Schedule>  carbidopa/levodopa  25/100 2 Tablet(s) Oral <User Schedule>  chlorhexidine 4% Liquid 1 Application(s) Topical <User Schedule>  cholecalciferol 1000 Unit(s) Oral daily  dextrose 40% Gel 15 Gram(s) Oral once  dextrose 5%. 1000 milliLiter(s) IV Continuous <Continuous>  dextrose 5%. 1000 milliLiter(s) IV Continuous <Continuous>  dextrose 50% Injectable 25 Gram(s) IV Push once  dextrose 50% Injectable 12.5 Gram(s) IV Push once  dextrose 50% Injectable 25 Gram(s) IV Push once  folic acid 1 milliGRAM(s) Oral daily  glucagon  Injectable 1 milliGRAM(s) IntraMuscular once  insulin glargine Injectable (LANTUS) 6 Unit(s) SubCutaneous at bedtime  insulin lispro (ADMELOG) corrective regimen sliding scale   SubCutaneous three times a day before meals  insulin lispro (ADMELOG) corrective regimen sliding scale   SubCutaneous at bedtime  levothyroxine 25 MICROGram(s) Oral daily  metoprolol tartrate 25 milliGRAM(s) Oral two times a day  midodrine 20 milliGRAM(s) Oral <User Schedule>  polyethylene glycol 3350 17 Gram(s) Oral two times a day  QUEtiapine 25 milliGRAM(s) Oral at bedtime  senna Syrup 10 milliLiter(s) Oral at bedtime  sevelamer carbonate 1600 milliGRAM(s) Oral three times a day  trihexyphenidyl 2 milliGRAM(s) Oral three times a day      PRN INPATIENT MEDICATION  acetaminophen     Tablet .. 650 milliGRAM(s) Oral every 6 hours PRN  albuterol/ipratropium for Nebulization 3 milliLiter(s) Nebulizer every 6 hours PRN  guaiFENesin Oral Liquid (Sugar-Free) 200 milliGRAM(s) Oral every 6 hours PRN  sodium chloride 0.9% lock flush 10 milliLiter(s) IV Push every 1 hour PRN        VITALS/PHYSICAL EXAM  --------------------------------------------------------------------------------  T(C): 36.6 (11-08-21 @ 10:58), Max: 36.7 (11-08-21 @ 04:23)  HR: 76 (11-08-21 @ 10:58) (76 - 112)  BP: 160/69 (11-08-21 @ 10:58) (150/99 - 162/99)  RR: 18 (11-08-21 @ 10:58) (18 - 18)  SpO2: 96% (11-08-21 @ 10:58) (95% - 96%)  Wt(kg): --        11-07-21 @ 07:01  -  11-08-21 @ 07:00  --------------------------------------------------------  IN: 160 mL / OUT: 100 mL / NET: 60 mL    11-08-21 @ 07:01  -  11-08-21 @ 14:05  --------------------------------------------------------  IN: 100 mL / OUT: 0 mL / NET: 100 mL    NAD,  A&Ox3/ Obese/ frail  HEENT:  NC/AT, PERRL, EOMI, mucosa moist, throat clear, trachea midline, neck supple   Neurology:  weakened strength & sensation grossly intact  Musculoskeletal:  no deformities/ contractures  Vascular: BLE equally warm,  BLE edema equal &      L>R DP/PT pulses palpable     no acute ischemia noted     R>LLE hemosiderin staining     RLE dry flakey skin easily removed  Skin:  improving dry flakey skin     Rt buttock 1cmx 2cm x 0.1cm moist granular w/ partial thickness skin loss     pale hyperpigmented skin deep maroon     no drainage  No odor, erythema, increased warmth, tenderness, induration, fluctuance        LABS/ CULTURES/ RADIOLOGY:              8.5    13.62 >-----------<  304      [11-08-21 @ 11:50]              27.3     138  |  97  |  74  ----------------------------<  120      [11-08-21 @ 11:50]  4.7   |  22  |  7.04        Ca     9.4     [11-08-21 @ 11:50]    TPro  7.2  /  Alb  2.8  /  TBili  1.0  /  DBili  x   /  AST  23  /  ALT  6   /  AlkPhos  113  [11-08-21 @ 11:50]    CAPILLARY BLOOD GLUCOSE  POCT Blood Glucose.: 120 mg/dL (08 Nov 2021 11:43)  POCT Blood Glucose.: 124 mg/dL (08 Nov 2021 07:43)  POCT Blood Glucose.: 149 mg/dL (07 Nov 2021 22:04)  POCT Blood Glucose.: 127 mg/dL (07 Nov 2021 16:40)    A1C with Estimated Average Glucose Result: 6.4 % (10-22-21 @ 08:56)    < from: VA Duplex Lower Ext Vein Scan, Didier (10.22.21 @ 10:16) >  INTERPRETATION:  CLINICAL INFORMATION: Leg swelling and erythema.    COMPARISON: Bilateral lower extremity venous duplex study dated 9/22/2019.    TECHNIQUE: Duplex sonography of the BILATERAL LOWER extremity veins with color and spectral Doppler, with and without compression.    FINDINGS:    RIGHT:  Normal compressibility of the RIGHT common femoral, femoral and popliteal veins.  Doppler examination shows normal spontaneous and phasic flow.  No RIGHT calf vein thrombosis is detected.    LEFT:  Normal compressibility of the LEFT common femoral, femoral and popliteal veins.  Doppler examination shows normal spontaneous and phasic flow.  NoLEFT calf vein thrombosis is detected.    IMPRESSION:  No evidence of deep venous thrombosis in either lower extremity.    < end of copied text >

## 2021-11-08 NOTE — PROGRESS NOTE ADULT - PROBLEM SELECTOR PLAN 3
For now, will try to avoid opioids due to Delirium.   Tylenol 650mg PO q 6 ATC x 3 days. After that can continue PRN.   If symptoms are recurrent and Delirium is improving then consider further work up and adding Oxy 2.5mg PO q 6 PRN   Leg elevation.   Wound care

## 2021-11-08 NOTE — CONSULT NOTE ADULT - PROBLEM SELECTOR PROBLEM 1
DM2 (diabetes mellitus, type 2)
Acute heart failure
Delirium
Stage 5 chronic kidney disease not on chronic dialysis

## 2021-11-08 NOTE — PROGRESS NOTE ADULT - SUBJECTIVE AND OBJECTIVE BOX
HPI:  72yo M w/  CAD (s/p CABG 2019), CKD (unknown stage), DM2, Parkinson's Disease, HTN, depression p/w b/l LE edema found to have ARF and NSTEMI w/ new Aflutter started on heparin gtt and HD. Course c/b RP bleed w/ anemia, admitted to the MICU for closer monitoring while on HD. Also with fluctuating mental status and confusion.     11/3 The patient was seen and examined with his son, Yunier, by his bed side. He denied pain; however, was grimacing and moaning when pressing on his LEs. He was clearly confused and with poor attention. He did not appear to be on any major distress beyond when pressing on his legs.     11/5 The patient is now out of the ICU. He is still having issues with attention and disorganized thinking. However, he was more alert and able to repeat some information but not able to retain it for a prolong time.      11/8 The patient was alert but still confused; however, his confusional state was less when compared with prior days. This time he knew he was in a hospital. He also recalled he just had physical therapy. Nonetheless, he was still having poor attention and his thinking process was still disorganized.He did not recall the reasons why he was admitted to the hospital. He  was c/o neck and right hip pain. Pain was increasing with movement. However, he was not able to give specific details about his pain.     PERTINENT PM/SXH:   Hypertension    Diabetes Mellitus, Type 2    Hypercholesterolemia    Parkinson disease    CAD (coronary artery disease)    Leg swelling    Autoimmune disease    Insomnia    Vertigo    Gastroesophageal reflux disease without esophagitis    Nocturia    Urinary incontinence    Urinary frequency    Panic attacks    Osteoarthritis    Shoulder pain, left      Status Post Cardiac Catheterization    S/P angioplasty with stent    S/P CABG (coronary artery bypass graft)    S/P hip replacement, right    Anxiety    Depression, major      FAMILY HISTORY:  Family history of hypertension    Family history of malaria    Family history of pulmonary fibrosis (Sibling)      ITEMS NOT CHECKED ARE NOT PRESENT    SOCIAL HISTORY:   Significant other/partner[ ]  Children[ ]  Yazidism/Spirituality:  Substance hx:  [ ]   Tobacco hx:  [ ]   Alcohol hx: [ ]   Home Opioid hx:  [ ] I-Stop Reference No:  Living Situation: [x ]Home  [ ]Long term care  [ ]Rehab [ ]Other  Lives with son and his daughter in law. Was independent with ADLs until before this admission. He did not have a h/o dementia.   ADVANCE DIRECTIVES:    DNR  MOLST  [ ]  Living Will  [ ]   DECISION MAKER(s):  [x ] Health Care Proxy(s)  [ ] Surrogate(s)  [ ] Guardian           Name(s): Phone Number(s): Branden Hardin 1ry and Yoly Hardin 2ry     BASELINE (I)ADL(s) (prior to admission):  Deale: [ x]Total  [ ] Moderate [ ]Dependent    Allergies    adhesives (Rash)  latex (Urticaria)  No Known Drug Allergies    Intolerances    MEDICATIONS  (STANDING):  ascorbic acid 500 milliGRAM(s) Oral daily  atorvastatin 80 milliGRAM(s) Oral at bedtime  carbidopa/levodopa  25/100 2.5 Tablet(s) Oral <User Schedule>  carbidopa/levodopa  25/100 2 Tablet(s) Oral <User Schedule>  chlorhexidine 4% Liquid 1 Application(s) Topical <User Schedule>  cholecalciferol 1000 Unit(s) Oral daily  dextrose 40% Gel 15 Gram(s) Oral once  dextrose 5%. 1000 milliLiter(s) (50 mL/Hr) IV Continuous <Continuous>  dextrose 5%. 1000 milliLiter(s) (100 mL/Hr) IV Continuous <Continuous>  dextrose 50% Injectable 25 Gram(s) IV Push once  dextrose 50% Injectable 12.5 Gram(s) IV Push once  dextrose 50% Injectable 25 Gram(s) IV Push once  folic acid 1 milliGRAM(s) Oral daily  glucagon  Injectable 1 milliGRAM(s) IntraMuscular once  insulin glargine Injectable (LANTUS) 6 Unit(s) SubCutaneous at bedtime  insulin lispro (ADMELOG) corrective regimen sliding scale   SubCutaneous three times a day before meals  insulin lispro (ADMELOG) corrective regimen sliding scale   SubCutaneous at bedtime  levothyroxine 25 MICROGram(s) Oral daily  metoprolol tartrate 25 milliGRAM(s) Oral two times a day  midodrine 20 milliGRAM(s) Oral <User Schedule>  polyethylene glycol 3350 17 Gram(s) Oral two times a day  QUEtiapine 25 milliGRAM(s) Oral at bedtime  senna Syrup 10 milliLiter(s) Oral at bedtime  sevelamer carbonate Powder 1600 milliGRAM(s) Oral three times a day  trihexyphenidyl 2 milliGRAM(s) Oral three times a day    MEDICATIONS  (PRN):  acetaminophen     Tablet .. 650 milliGRAM(s) Oral every 6 hours PRN Mild Pain (1 - 3)  albuterol/ipratropium for Nebulization 3 milliLiter(s) Nebulizer every 6 hours PRN Shortness of Breath and/or Wheezing  guaiFENesin Oral Liquid (Sugar-Free) 200 milliGRAM(s) Oral every 6 hours PRN Cough  sodium chloride 0.9% lock flush 10 milliLiter(s) IV Push every 1 hour PRN Pre/post blood products, medications, blood draw, and to maintain line patency        PRESENT SYMPTOMS: [x ]Unable to obtain due to poor mentation   Source if other than patient:  [ ]Family   [ ]Team     Pain: [x ]yes [ ]no  QOL impact - not able to indicate  Location -                 right hip and neck   Aggravating factors - movement    Quality - not able to indicate  Radiation - none   Timing- with movement   Severity (0-10 scale): mild to moderate with movement.   Minimal acceptable level (0-10 scale): mild     CPOT:    https://www.[email or web address]/getattachment/[email or web address]/Critical-Care-Pain-Observation-Tool-(CPOT)      PAIN AD Score: 7 with palpation     [email or web address] (press ctrl +  left click to view)    Dyspnea:                           [ ]Mild [ ]Moderate [ ]Severe  Anxiety:                             [ ]Mild [ ]Moderate [ ]Severe  Fatigue:                             [ ]Mild [ ]Moderate [ ]Severe  Nausea:                             [ ]Mild [ ]Moderate [ ]Severe  Loss of appetite:              [ ]Mild [ ]Moderate [ ]Severe  Constipation:                    [ ]Mild [ ]Moderate [ ]Severe    Other Symptoms:  [ ]All other review of systems negative     Palliative Performance Status Version 2:  30       %    http://npcrc.org/files/news/palliative_performance_scale_ppsv2.pdf  PHYSICAL EXAM:  Vital Signs Last 24 Hrs  T(C): 36.6 (08 Nov 2021 10:58), Max: 36.7 (08 Nov 2021 04:23)  T(F): 97.9 (08 Nov 2021 10:58), Max: 98 (08 Nov 2021 04:23)  HR: 76 (08 Nov 2021 10:58) (76 - 112)  BP: 160/69 (08 Nov 2021 10:58) (150/99 - 162/99)  BP(mean): --  RR: 18 (08 Nov 2021 10:58) (18 - 18)  SpO2: 96% (08 Nov 2021 10:58) (95% - 96%)    GENERAL:  [x ]Alert  [ x]Oriented x  1 [ ]Lethargic  [ ]Cachexia  [ ]Unarousable  [ ]Verbal  [ ]Non-Verbal  Behavioral:   [ ] Anxiety  [x ] Delirium [ ] Agitation [ ] Other  HEENT:  [ ]Normal   [x ]Dry mouth   [ ]ET Tube/Trach  [ ]Oral lesions  PULMONARY:   [x ]Clear [ ]Tachypnea  [ ]Audible excessive secretions   [ ]Rhonchi        [ ]Right [ ]Left [ ]Bilateral  [ ]Crackles        [ ]Right [ ]Left [ ]Bilateral  [ ]Wheezing     [ ]Right [ ]Left [ ]Bilateral  [ ]Diminished breath sounds [ ]right [ ]left [ ]bilateral  CARDIOVASCULAR:    [x ]Regular [ ]Irregular [ ]Tachy  [ ]Trevon [ ]Murmur [ ]Other  GASTROINTESTINAL:  [x ]Soft  [ ]Distended   [x ]+BS  [ x]Non tender [ ]Tender  [ ]PEG [ ]OGT/ NGT  Last BM: 11/6  GENITOURINARY:  [ ]Normal [x ] Incontinent   [x ]Oliguria/Anuria   [ ]Javier  MUSCULOSKELETAL:   [ ]Normal   [x ]Weakness  [ ]Bed/Wheelchair bound [ ]Edema [x] pain over his right hip when lifting it. Pain when rotating his neck. No edema or erythema over neck or hip joint.   NEUROLOGIC:   [ ]No focal deficits  [x ]Cognitive impairment  [ ]Dysphagia [ ]Dysarthria [ ]Paresis [x ]Other: Delirium   SKIN:   [ ]Normal    [ ]Rash  [ ]Pressure ulcer(s)       Present on admission [ ]y [ ]n [x] Venous stasis changes over LE.     CRITICAL CARE:  [ ] Shock Present  [ ]Septic [ ]Cardiogenic [ ]Neurologic [ ]Hypovolemic  [ ]  Vasopressors [ ]  Inotropes   [ ]Respiratory failure present [ ]Mechanical ventilation [ ]Non-invasive ventilatory support [ ]High flow    [ ]Acute  [ ]Chronic [ ]Hypoxic  [ ]Hypercarbic [ ]Other  [ ]Other organ failure     LABS:                                                      8.5    13.62 )-----------( 304      ( 08 Nov 2021 11:50 )             27.3   11-08    138  |  97  |  74<H>  ----------------------------<  120<H>  4.7   |  22  |  7.04<H>    Ca    9.4      08 Nov 2021 11:50    TPro  7.2  /  Alb  2.8<L>  /  TBili  1.0  /  DBili  x   /  AST  23  /  ALT  6<L>  /  AlkPhos  113  11-08    RADIOLOGY & ADDITIONAL STUDIES:  < from: TTE with Doppler (w/Cont) (10.21.21 @ 17:11) >    Patient name: ORLIN HARDIN  YOB: 1950   Age: 71 (M)   MR#: [id number]  Study Date: 10/21/2021EF (Visual Estimate): 35 %Conclusions:  1. Calcified trileaflet aortic valve with normal opening.  Peak transaortic valve gradient equals 10 mm Hg, mean  transaortic valve gradient equals 6 mm Hg, aortic valve  velocity time integral equals 40 cm, estimated aortic valve  area equals 2.1 sqcm.  2. Endocardial visualization enhanced with intravenous  injection of Ultrasonic Enhancing Agent (Definity).  moderate to severe segmental left ventricular systolic  dysfunction. There is hypokinesis of anterior wall, septum  and apex. Visual estimate of EF about 35%.  3. Normal right ventricular size with decreased right  ventricular systolic function.  4. Normal tricuspid valve. Mild tricuspid regurgitation.  5. Estimated pulmonary artery systolic pressure equals 24  mm Hg, assuming right atrial pressure equals 8  mm Hg,  consistent with normal pulmonary pressures.  6. Bilateral pleural effusions.  ------------------------------------------------------------------------  Confirmed on  10/21/2021 - 19:08:16 by Aicha Pettit M.D.  ------------------------------------------------------------------------    < end of copied text >  < from: CT Abdomen and Pelvis w/ IV Cont (10.27.21 @ 17:13) >    EXAM:  CT ABDOMEN AND PELVIS IC                            PROCEDURE DATE:  10/27/2021    IMPRESSION:  Study limited by delayed arterial phase.    Interval increase in size of a now large left retroperitoneal hematoma measuring up to 18.3 cm in craniocaudal dimension, previously 7.4 cm, with suspicion for contrast extravasation on arterial phase imaging, suspicious for active bleed. Essentially unchanged small right retroperitoneal hematoma.    Left kidney is now anteriorly displaced by the large left retroperitoneal hematoma, which also contacts the mid to distal ureter which results in new mild left sided hydroureteronephrosis.    Unchanged moderate left and small right pleural effusions and unchanged pulmonary opacities at the lung bases.    A 1.2 cm cystic lesion in the pancreatic neck. Consider further evaluation on a nonemergent outpatient basis with MRI/MRCP.    New small ascites.    Findings regarding the left retroperitoneal hemorrhagewere discussed by Abbey DAMON with Cuconstantin NP on 10/27/2021 at 5:56 PM.    --- End of Report ---    < end of copied text >      PROTEIN CALORIE MALNUTRITION PRESENT: [ ]mild [ ]moderate [ ]severe [ ]underweight [ ]morbid obesity  https://www.andeal.org/vault/2440/web/files/ONC/Table_Clinical%20Characteristics%20to%20Document%20Malnutrition-White%20JV%20et%20al%202012.pdf    Height (cm): 180.3 (10-27-21 @ 21:30)  Weight (kg): 96.4 (10-27-21 @ 21:30)  BMI (kg/m2): 29.7 (10-27-21 @ 21:30)    [ ]PPSV2 < or = to 30% [ ]significant weight loss  [ ]poor nutritional intake  [ ]anasarca      [ ]Artificial Nutrition      REFERRALS:   [ ]Chaplaincy  [ ]Hospice  [ ]Child Life  [ ]Social Work  [ ]Case management [ ]Holistic Therapy     Goals of Care Document:

## 2021-11-08 NOTE — PROGRESS NOTE ADULT - PROBLEM SELECTOR PLAN 1
Appears to be slightly improving   Work up an treatment of possible causes as per the primary team.   Will also advise:   -Frequent reassurance and verbal orientation   -Family members or other familiar persons by his bedside.   -Delusions and hallucinations should be neither endorsed nor challenged.   -Physical restraints should be avoided. Alternatives to restraint use, such as constant observation (preferably by someone familiar to the patient such as a family member), may be more effective.  -PT eval and early ambulation if possible  -Update the calendar date in the room.

## 2021-11-08 NOTE — CHART NOTE - NSCHARTNOTEFT_GEN_A_CORE
Please re-consult when discharge planning is in place; If patient will get AVG as inpatient can consider converting HD catheter to tunneled catheter after.

## 2021-11-08 NOTE — CONSULT NOTE ADULT - SUBJECTIVE AND OBJECTIVE BOX
Vascular Surgery Consult    Consulting attending: Tramaine Ferraro       HPI:  70yo M w/ PMHx of CAD (s/p CABG 2019), CKD (unknown stage), DM2, Parkinson's Disease, HTN, depression presents with bilateral leg swelling, patient's daughter in-law at bedside Yoly Quintero (4 [facility] Nurse) provides the history, the daughter reports the patient is a poor historian, unable to remember having conversations with others and likely cannot weigh risk/benefits of medical conditions, she reports that he has had bilateral lower extremity swelling for the past few months, but over the past 2 weeks it has significantly worsened, he has further difficulty ambulating due to swelling, his outpatient provider attempted to manage with 20mg lasix and then increased to 40mg lasix but the patient never took the increased dose, his symptoms ar persistent throughout the day and are extremely severe, he has developed wounds of the legs with weeping of fluid due to the swelling, she reports that the patient is frequently non-compliant with medications and medical management, he lives at home with his son and daughter in law, he denies chest pain, shortness of breath, fever/chills, cough, sputum, in the ED, he was tachycardic but hemodynamically stable, afebrile, saturating well on room air, labs were significant for elevated BNP, elevated creatinine, imaging showed moderate left pleural effusion, patient was admitted to general medicine for further management  (21 Oct 2021 07:06)    Admitted with new onset acute heart failure exacerbation, nstemi, RP bleed, acute anemia s/p MICU transfer, now stable on floor. Acute on chronic renal failure, anticipate need for long term HD, to get PC this admission.     Patient is right hand dominant,  but is unable to answer other questions about his history. Talked with son and daughter in law about patient's care. Patient has never seen vascular surgeon before.    PAST MEDICAL HISTORY:  Hypertension    Diabetes Mellitus, Type 2    Hypercholesterolemia    Parkinson disease    CAD (coronary artery disease)    Leg swelling    Autoimmune disease    Insomnia    Vertigo    Gastroesophageal reflux disease without esophagitis    Nocturia    Urinary incontinence    Urinary frequency    Panic attacks    Osteoarthritis    Shoulder pain, left          PAST SURGICAL HISTORY:  Status Post Cardiac Catheterization    S/P angioplasty with stent    S/P CABG (coronary artery bypass graft)    S/P hip replacement, right    Anxiety    Depression, major          MEDICATIONS:  acetaminophen     Tablet .. 650 milliGRAM(s) Oral every 6 hours PRN  albuterol/ipratropium for Nebulization 3 milliLiter(s) Nebulizer every 6 hours PRN  ascorbic acid 500 milliGRAM(s) Oral daily  atorvastatin 80 milliGRAM(s) Oral at bedtime  carbidopa/levodopa  25/100 2.5 Tablet(s) Oral <User Schedule>  carbidopa/levodopa  25/100 2 Tablet(s) Oral <User Schedule>  chlorhexidine 4% Liquid 1 Application(s) Topical <User Schedule>  cholecalciferol 1000 Unit(s) Oral daily  dextrose 40% Gel 15 Gram(s) Oral once  dextrose 5%. 1000 milliLiter(s) IV Continuous <Continuous>  dextrose 5%. 1000 milliLiter(s) IV Continuous <Continuous>  dextrose 50% Injectable 25 Gram(s) IV Push once  dextrose 50% Injectable 12.5 Gram(s) IV Push once  dextrose 50% Injectable 25 Gram(s) IV Push once  folic acid 1 milliGRAM(s) Oral daily  glucagon  Injectable 1 milliGRAM(s) IntraMuscular once  guaiFENesin Oral Liquid (Sugar-Free) 200 milliGRAM(s) Oral every 6 hours PRN  insulin glargine Injectable (LANTUS) 6 Unit(s) SubCutaneous at bedtime  insulin lispro (ADMELOG) corrective regimen sliding scale   SubCutaneous three times a day before meals  insulin lispro (ADMELOG) corrective regimen sliding scale   SubCutaneous at bedtime  levothyroxine 25 MICROGram(s) Oral daily  metoprolol tartrate 25 milliGRAM(s) Oral two times a day  midodrine 20 milliGRAM(s) Oral <User Schedule>  polyethylene glycol 3350 17 Gram(s) Oral two times a day  QUEtiapine 25 milliGRAM(s) Oral at bedtime  senna Syrup 10 milliLiter(s) Oral at bedtime  sevelamer carbonate Powder 1600 milliGRAM(s) Oral three times a day  sodium chloride 0.9% lock flush 10 milliLiter(s) IV Push every 1 hour PRN  trihexyphenidyl 2 milliGRAM(s) Oral three times a day        ALLERGIES:  adhesives (Rash)  latex (Urticaria)  No Known Drug Allergies        VITALS & I/Os:  Vital Signs Last 24 Hrs  T(C): 36.6 (08 Nov 2021 10:58), Max: 36.7 (08 Nov 2021 04:23)  T(F): 97.9 (08 Nov 2021 10:58), Max: 98 (08 Nov 2021 04:23)  HR: 76 (08 Nov 2021 10:58) (76 - 112)  BP: 160/69 (08 Nov 2021 10:58) (150/99 - 162/92)  BP(mean): --  RR: 18 (08 Nov 2021 10:58) (18 - 18)  SpO2: 96% (08 Nov 2021 10:58) (95% - 96%)    I&O's Summary    07 Nov 2021 07:01  -  08 Nov 2021 07:00  --------------------------------------------------------  IN: 160 mL / OUT: 100 mL / NET: 60 mL    08 Nov 2021 07:01  -  08 Nov 2021 17:11  --------------------------------------------------------  IN: 100 mL / OUT: 0 mL / NET: 100 mL          PHYSICAL EXAM:  General: No acute distress, confused  Respiratory: Nonlabored  Cardiovascular: RRR  Abdominal: Soft, nondistended, nontender. No rebound or guarding. No organomegaly, no palpable mass.  Extremities: Warm  Vascular:  - BUE: wwp, in restraints, 2+_radial pulses, sensation intact    LABS:                        8.5    13.62 )-----------( 304      ( 08 Nov 2021 11:50 )             27.3     11-08    138  |  97  |  74<H>  ----------------------------<  120<H>  4.7   |  22  |  7.04<H>    Ca    9.4      08 Nov 2021 11:50    TPro  7.2  /  Alb  2.8<L>  /  TBili  1.0  /  DBili  x   /  AST  23  /  ALT  6<L>  /  AlkPhos  113  11-08    Lactate:                    IMAGING:                                                                                               Vascular Surgery Consult    Consulting attending: Tramaine Ferraro       HPI:  70yo M w/ PMHx of CAD (s/p CABG 2019), CKD (unknown stage), DM2, Parkinson's Disease, HTN, depression presents with bilateral leg swelling, patient's daughter in-law at bedside Yoly Quintero (4 [facility] Nurse) provides the history, the daughter reports the patient is a poor historian, unable to remember having conversations with others and likely cannot weigh risk/benefits of medical conditions, she reports that he has had bilateral lower extremity swelling for the past few months, but over the past 2 weeks it has significantly worsened, he has further difficulty ambulating due to swelling, his outpatient provider attempted to manage with 20mg lasix and then increased to 40mg lasix but the patient never took the increased dose, his symptoms ar persistent throughout the day and are extremely severe, he has developed wounds of the legs with weeping of fluid due to the swelling, she reports that the patient is frequently non-compliant with medications and medical management, he lives at home with his son and daughter in law, he denies chest pain, shortness of breath, fever/chills, cough, sputum, in the ED, he was tachycardic but hemodynamically stable, afebrile, saturating well on room air, labs were significant for elevated BNP, elevated creatinine, imaging showed moderate left pleural effusion, patient was admitted to general medicine for further management  (21 Oct 2021 07:06)    Admitted with new onset acute heart failure exacerbation, nstemi, RP bleed, acute anemia s/p MICU transfer, now stable on floor. Acute on chronic renal failure, anticipate need for long term HD, to get PC this admission.     Patient is right hand dominant,  but is unable to answer other questions about his history. Talked with son and daughter in law about patient's care. Patient has never seen vascular surgeon before.    PAST MEDICAL HISTORY:  Hypertension    Diabetes Mellitus, Type 2    Hypercholesterolemia    Parkinson disease    CAD (coronary artery disease)    Leg swelling    Autoimmune disease    Insomnia    Vertigo    Gastroesophageal reflux disease without esophagitis    Nocturia    Urinary incontinence    Urinary frequency    Panic attacks    Osteoarthritis    Shoulder pain, left          PAST SURGICAL HISTORY:  Status Post Cardiac Catheterization    S/P angioplasty with stent    S/P CABG (coronary artery bypass graft)    S/P hip replacement, right    Anxiety    Depression, major          MEDICATIONS:  acetaminophen     Tablet .. 650 milliGRAM(s) Oral every 6 hours PRN  albuterol/ipratropium for Nebulization 3 milliLiter(s) Nebulizer every 6 hours PRN  ascorbic acid 500 milliGRAM(s) Oral daily  atorvastatin 80 milliGRAM(s) Oral at bedtime  carbidopa/levodopa  25/100 2.5 Tablet(s) Oral <User Schedule>  carbidopa/levodopa  25/100 2 Tablet(s) Oral <User Schedule>  chlorhexidine 4% Liquid 1 Application(s) Topical <User Schedule>  cholecalciferol 1000 Unit(s) Oral daily  dextrose 40% Gel 15 Gram(s) Oral once  dextrose 5%. 1000 milliLiter(s) IV Continuous <Continuous>  dextrose 5%. 1000 milliLiter(s) IV Continuous <Continuous>  dextrose 50% Injectable 25 Gram(s) IV Push once  dextrose 50% Injectable 12.5 Gram(s) IV Push once  dextrose 50% Injectable 25 Gram(s) IV Push once  folic acid 1 milliGRAM(s) Oral daily  glucagon  Injectable 1 milliGRAM(s) IntraMuscular once  guaiFENesin Oral Liquid (Sugar-Free) 200 milliGRAM(s) Oral every 6 hours PRN  insulin glargine Injectable (LANTUS) 6 Unit(s) SubCutaneous at bedtime  insulin lispro (ADMELOG) corrective regimen sliding scale   SubCutaneous three times a day before meals  insulin lispro (ADMELOG) corrective regimen sliding scale   SubCutaneous at bedtime  levothyroxine 25 MICROGram(s) Oral daily  metoprolol tartrate 25 milliGRAM(s) Oral two times a day  midodrine 20 milliGRAM(s) Oral <User Schedule>  polyethylene glycol 3350 17 Gram(s) Oral two times a day  QUEtiapine 25 milliGRAM(s) Oral at bedtime  senna Syrup 10 milliLiter(s) Oral at bedtime  sevelamer carbonate Powder 1600 milliGRAM(s) Oral three times a day  sodium chloride 0.9% lock flush 10 milliLiter(s) IV Push every 1 hour PRN  trihexyphenidyl 2 milliGRAM(s) Oral three times a day        ALLERGIES:  adhesives (Rash)  latex (Urticaria)  No Known Drug Allergies        VITALS & I/Os:  Vital Signs Last 24 Hrs  T(C): 36.6 (08 Nov 2021 10:58), Max: 36.7 (08 Nov 2021 04:23)  T(F): 97.9 (08 Nov 2021 10:58), Max: 98 (08 Nov 2021 04:23)  HR: 76 (08 Nov 2021 10:58) (76 - 112)  BP: 160/69 (08 Nov 2021 10:58) (150/99 - 162/92)  BP(mean): --  RR: 18 (08 Nov 2021 10:58) (18 - 18)  SpO2: 96% (08 Nov 2021 10:58) (95% - 96%)    I&O's Summary    07 Nov 2021 07:01  -  08 Nov 2021 07:00  --------------------------------------------------------  IN: 160 mL / OUT: 100 mL / NET: 60 mL    08 Nov 2021 07:01  -  08 Nov 2021 17:11  --------------------------------------------------------  IN: 100 mL / OUT: 0 mL / NET: 100 mL          PHYSICAL EXAM:  General: No acute distress, confused  Respiratory: Nonlabored  Cardiovascular: RRR  Abdominal: Soft, nondistended, nontender. No rebound or guarding. No organomegaly, no palpable mass.  Extremities: Warm  Vascular:  - BUE: wwp, in restraints, 2+_radial pulses, sensation intact    LABS:                        8.5    13.62 )-----------( 304      ( 08 Nov 2021 11:50 )             27.3     11-08    138  |  97  |  74<H>  ----------------------------<  120<H>  4.7   |  22  |  7.04<H>    Ca    9.4      08 Nov 2021 11:50    TPro  7.2  /  Alb  2.8<L>  /  TBili  1.0  /  DBili  x   /  AST  23  /  ALT  6<L>  /  AlkPhos  113  11-08

## 2021-11-08 NOTE — PROGRESS NOTE ADULT - ASSESSMENT
A/P: 72yo M w/ PMHx of CAD (s/p CABG 2019), CKD (unknown stage), DM2, Parkinson's Disease, HTN, depression presents with new onset acute heart failure exacerbation     Wound Consult requested to assist w/ management of  BLE PVD w/ venous stasis   Incontinence Dermatitis   Stage 2 of  Rt buttocks  Incontinent of stool and urine      Buttock/ Sacrum- Allevyn and Continue w/ Pericare as per protocol  BLE cleansing w/ moisturizing and Compression   BLE Duplex- no DVT      xray results - no acute pathology  BLE elevation  Abx per Medicine/ ID  Moisturize intact skin w/ SWEEN cream BID  Nutrition Consult for optimization        consider MVI & Vit C to promote wound healing        encourage high quality protein  hyperglycemia - ADA diet w/ lantus and FS w/ ISS qmeal and qhs  Continue turning and positioning w/ offloading assistive devices as per protocol  Waffle Cushion to chair when oob to chair  Continue w/ low air loss bed surface   Care as per medicine, remain available as requested  Upon discharge f/u as outpatient at [facility] [address] [place], [place] [id number]  D/w team & RN  Thank you for this consult  TESSA HameedC CWS [id number]  I spent 50minutes w/ this pt of which more than 50% of the time was spent counseling & coordinating care of this pt.

## 2021-11-09 NOTE — PROGRESS NOTE ADULT - PROBLEM SELECTOR PLAN 4
-  - medical management given renal function and significant anemia.   - cont statin, BB. Add aspirin 81 mg daily once feasible.     [facility]

## 2021-11-09 NOTE — PROGRESS NOTE ADULT - ASSESSMENT
Assessment  DMT2: 71y Male with DM T2 with hyperglycemia, A1C 6.4%, was on insulin at home, now on low-dose basal insulin and coverage, blood sugars are stable and trending within acceptable range, no hypoglycemic episodes, eating meals, appears comfortable.  Hypothyroidism: Patient has no history thyroid disease, was not on any thyroid supplements, subclinical with low-normal FT4, lethargic, started on synthroid 25 mcg po daily, FT4 improved to 1.2.  CHF: on medications, stable, monitored.  HTN: Controlled,  on antihypertensive medications.  Parkinsons: on meds, monitored.  CKD: Monitor labs/BMP      Kelsie Reece MD  Cell: [phone]  Office: [phone]     Assessment  DMT2: 71y Male with DM T2 with hyperglycemia, A1C 6.4%, was on insulin at home, now on low-dose basal insulin and coverage, blood sugars are stable and trending within acceptable range, no hypoglycemic episodes,  eating meals, appears comfortable.  Hypothyroidism: Patient has no history thyroid disease, was not on any thyroid supplements, subclinical with low-normal FT4, lethargic, started on synthroid 25 mcg po daily, FT4 improved to 1.2.  CHF: on medications, stable, monitored.  HTN: Controlled,  on antihypertensive medications.  Parkinsons: on meds, monitored.  CKD: Monitor labs/BMP      Kelsie Reece MD  Cell: [phone]  Office: [phone]

## 2021-11-09 NOTE — CHART NOTE - NSCHARTNOTEFT_GEN_A_CORE
72yo M w/ PMHx of CAD (s/p CABG 2019), CKD (unknown stage), DM2, Parkinson's Disease, HTN, depression presents with bilateral leg swelling, new onset acute heart failure exacerbation, NSTEMI, started on hep gtt, now with bl RP bleed, acute anemia. transferred to MICU. Hematology consulted in the setting of prolonged PTT, not corrected on mixing study with abnormal factor XI.     #Prolonged PTT   #Normocytic Anemia   - patient presented with b/l leg swelling treated as acute HF exacerbation, NSTEMI, started on heparin drip developed RP bleed s/p embolization on 10/28   - on admission PTT prolonged 53.2 now off heparin drip remained prolonged in the 50s   - mixing study at 2hr did not correct   - VWF, factor VIII, IX. 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 Unlikely to be the cause of bleeding   - given the above studies, consistent with inhibitor  - patient had BM at bedside, no melena seen   - Hb 6.7- transfuse to keep hb >7, may need to be higher in the setting of NSTEMI- would discuss with cardiology   - LAC positive- likely reason for prolonged PTT     #MGUS   - patient noted with an M spike of 0.4  - IgG lambda on PHOEBE   - please send free kappa/lambda light chains and quantitative immunoglobulins     Hemoglobin has been stable. [place] [facility] Hematology to sign off. Please call with any questions/concerns. Discussed with primary team     Beau Russell MD   Hematology/Oncology Fellow   pager [phone]   After 5pm and on weekends page on call fellow

## 2021-11-09 NOTE — SWALLOW VFSS/MBS ASSESSMENT ADULT - SLP GENERAL OBSERVATIONS
Pt received in radiology secured in SANDOR chair. AA+Ox2; confused/tangential. Able to make needs known.

## 2021-11-09 NOTE — PROGRESS NOTE ADULT - SUBJECTIVE AND OBJECTIVE BOX
NEPHROLOGY    Patient seen and examined.    MEDICATIONS  (STANDING):  acetaminophen     Tablet .. 650 milliGRAM(s) Oral every 6 hours  ascorbic acid 500 milliGRAM(s) Oral daily  atorvastatin 80 milliGRAM(s) Oral at bedtime  carbidopa/levodopa  25/100 2.5 Tablet(s) Oral <User Schedule>  carbidopa/levodopa  25/100 2 Tablet(s) Oral <User Schedule>  chlorhexidine 4% Liquid 1 Application(s) Topical <User Schedule>  cholecalciferol 1000 Unit(s) Oral daily  dextrose 40% Gel 15 Gram(s) Oral once  dextrose 5%. 1000 milliLiter(s) (50 mL/Hr) IV Continuous <Continuous>  dextrose 5%. 1000 milliLiter(s) (100 mL/Hr) IV Continuous <Continuous>  dextrose 50% Injectable 25 Gram(s) IV Push once  dextrose 50% Injectable 12.5 Gram(s) IV Push once  dextrose 50% Injectable 25 Gram(s) IV Push once  epoetin mari-epbx (RETACRIT) Injectable 01676 Unit(s) IV Push once  folic acid 1 milliGRAM(s) Oral daily  glucagon  Injectable 1 milliGRAM(s) IntraMuscular once  insulin glargine Injectable (LANTUS) 6 Unit(s) SubCutaneous at bedtime  insulin lispro (ADMELOG) corrective regimen sliding scale   SubCutaneous three times a day before meals  insulin lispro (ADMELOG) corrective regimen sliding scale   SubCutaneous at bedtime  levothyroxine 25 MICROGram(s) Oral daily  metoprolol tartrate 25 milliGRAM(s) Oral two times a day  midodrine 20 milliGRAM(s) Oral <User Schedule>  polyethylene glycol 3350 17 Gram(s) Oral two times a day  QUEtiapine 25 milliGRAM(s) Oral at bedtime  senna Syrup 10 milliLiter(s) Oral at bedtime  sevelamer carbonate Powder 1600 milliGRAM(s) Oral three times a day  trihexyphenidyl 2 milliGRAM(s) Oral three times a day    VITALS:  T(C): , Max: 36.7 (11-08-21 @ 20:20)  T(F): , Max: 98 (11-08-21 @ 20:20)  HR: 118 (11-09-21 @ 04:00)  BP: 160/80 (11-09-21 @ 04:00)  RR: 18 (11-09-21 @ 04:00)  SpO2: 97% (11-09-21 @ 04:00)    I and O's:    11-08 @ 07:01  -  11-09 @ 07:00  --------------------------------------------------------  IN: 100 mL / OUT: 0 mL / NET: 100 mL    PHYSICAL EXAM:  Constitutional: NAD  Neck:  No JVD  Respiratory: CTAB/L  Cardiovascular: S1 and S2  Gastrointestinal: BS+, soft, NT/ND  Extremities: No peripheral edema  Neurological: A/O x 3, no focal deficits  Psychiatric: Normal mood, normal affect  : No Javier  Skin: No rashes  Access: [facility]     LABS:                        8.2    12.30 )-----------( 329      ( 09 Nov 2021 05:08 )             26.5     11-09    135  |  96  |  80<H>  ----------------------------<  109<H>  4.4   |  23  |  7.51<H>    Ca    9.1      09 Nov 2021 05:08    TPro  7.2  /  Alb  2.8<L>  /  TBili  1.0  /  DBili  x   /  AST  23  /  ALT  6<L>  /  AlkPhos  113  11-08

## 2021-11-09 NOTE — PROGRESS NOTE ADULT - ASSESSMENT
70yo M w/ PMHx of CAD (s/p CABG 2019), CKD (unknown stage), DM2, Parkinson's Disease, HTN, depression presents with new onset acute heart failure exacerbation, NSTEMI, started on hep gtt, developed bl RP bleed, acute anemia. sp MICU course for hemorrhagic shock, 10/28 sp IR embolization l4 and l5 lumbar artery    # Acute systolic and diastolic heart failure  # NSTEMI- conservative mgmt dt renal function and anemia-bld loss  # ABBY on CKD- newly started HD  # Atrial flutter  # acute hypoxic resp failure  # hemorrhagic shock, left RP hematoma, acute blood loss anemia- sp micu course -stable hb  Echo TTE mod to severe LV SD, hypokinesis anterior wall, not candidate for cath at this point  HD per renal  10/28 sp IR embolization l4 and l5 lumbar artery  on midodrine,  during dialysis  swallow eval- passed- monitor on regular diet  leucocytosis- monitor, afebrile  elev lfts- post shock- will check cmp to- improved  plan for permacath placement and vascular fu for av fistula  cards fu noted- pt mod-high risk for vascular procedure, but medically optimised    # DM2 (diabetes mellitus, type 2)  per endo  hba1c 6.4    # CAD (coronary artery disease)  c/w atorvastatin  asa on hold    # Parkinsons disease   # delirium  carbidopa/levodopa restarted  c/w trihexyphenidyl  seroquel  if no improvement in MS /or worsening consider psych cs    #GOC and adv directives noted- palliative fu noted  plan to dc to [facility] upon dc    PCP Dr. Ronak Rogers will be covering  starting 11/10/21  please call [employer] @ [phone] for questions or concerns

## 2021-11-09 NOTE — PROGRESS NOTE ADULT - SUBJECTIVE AND OBJECTIVE BOX
Patient is a 71y old  Male who presents with a chief complaint of leg swelling (09 Nov 2021 08:49)      INTERVAL HPI/OVERNIGHT EVENTS: noted  pt seen and examined this am   events noted  no new compliants  denies cp/sob/dizziness      Vital Signs Last 24 Hrs  T(C): 36.4 (09 Nov 2021 04:00), Max: 36.7 (08 Nov 2021 20:20)  T(F): 97.5 (09 Nov 2021 04:00), Max: 98 (08 Nov 2021 20:20)  HR: 118 (09 Nov 2021 04:00) (106 - 118)  BP: 160/80 (09 Nov 2021 04:00) (144/83 - 160/80)  BP(mean): --  RR: 18 (09 Nov 2021 04:00) (18 - 18)  SpO2: 97% (09 Nov 2021 04:00) (97% - 98%)    acetaminophen     Tablet .. 650 milliGRAM(s) Oral every 6 hours PRN  acetaminophen     Tablet .. 650 milliGRAM(s) Oral every 6 hours  albuterol/ipratropium for Nebulization 3 milliLiter(s) Nebulizer every 6 hours PRN  ascorbic acid 500 milliGRAM(s) Oral daily  atorvastatin 80 milliGRAM(s) Oral at bedtime  carbidopa/levodopa  25/100 2.5 Tablet(s) Oral <User Schedule>  carbidopa/levodopa  25/100 2 Tablet(s) Oral <User Schedule>  chlorhexidine 4% Liquid 1 Application(s) Topical <User Schedule>  cholecalciferol 1000 Unit(s) Oral daily  dextrose 40% Gel 15 Gram(s) Oral once  dextrose 5%. 1000 milliLiter(s) IV Continuous <Continuous>  dextrose 5%. 1000 milliLiter(s) IV Continuous <Continuous>  dextrose 50% Injectable 25 Gram(s) IV Push once  dextrose 50% Injectable 12.5 Gram(s) IV Push once  dextrose 50% Injectable 25 Gram(s) IV Push once  epoetin mari-epbx (RETACRIT) Injectable 58019 Unit(s) IV Push once  folic acid 1 milliGRAM(s) Oral daily  glucagon  Injectable 1 milliGRAM(s) IntraMuscular once  guaiFENesin Oral Liquid (Sugar-Free) 200 milliGRAM(s) Oral every 6 hours PRN  insulin glargine Injectable (LANTUS) 6 Unit(s) SubCutaneous at bedtime  insulin lispro (ADMELOG) corrective regimen sliding scale   SubCutaneous three times a day before meals  insulin lispro (ADMELOG) corrective regimen sliding scale   SubCutaneous at bedtime  levothyroxine 25 MICROGram(s) Oral daily  metoprolol tartrate 25 milliGRAM(s) Oral two times a day  midodrine 20 milliGRAM(s) Oral <User Schedule>  polyethylene glycol 3350 17 Gram(s) Oral two times a day  QUEtiapine 25 milliGRAM(s) Oral at bedtime  senna Syrup 10 milliLiter(s) Oral at bedtime  sevelamer carbonate Powder 1600 milliGRAM(s) Oral three times a day  sodium chloride 0.9% lock flush 10 milliLiter(s) IV Push every 1 hour PRN  trihexyphenidyl 2 milliGRAM(s) Oral three times a day      PHYSICAL EXAM:  GENERAL: NAD,   EYES: conjunctiva and sclera clear  ENMT: Moist mucous membranes  NECK: Supple, No JVD, Normal thyroid  CHEST/LUNG: non labored, cta b/l  HEART: Regular rate and rhythm; No murmurs, rubs, or gallops  ABDOMEN: Soft, Nontender, Nondistended; Bowel sounds present  EXTREMITIES:  2+ Peripheral Pulses, No clubbing, cyanosis, or edema  LYMPH: No lymphadenopathy noted  SKIN: No rashes or lesions    Consultant(s) Notes Reviewed:  [x ] YES  [ ] NO  Care Discussed with Consultants/Other Providers [ x] YES  [ ] NO    LABS:                        8.2    12.30 )-----------( 329      ( 09 Nov 2021 05:08 )             26.5     11-09    135  |  96  |  80<H>  ----------------------------<  109<H>  4.4   |  23  |  7.51<H>    Ca    9.1      09 Nov 2021 05:08    TPro  7.2  /  Alb  2.8<L>  /  TBili  1.0  /  DBili  x   /  AST  23  /  ALT  6<L>  /  AlkPhos  113  11-08        CAPILLARY BLOOD GLUCOSE      POCT Blood Glucose.: 125 mg/dL (09 Nov 2021 08:00)  POCT Blood Glucose.: 136 mg/dL (08 Nov 2021 21:43)  POCT Blood Glucose.: 141 mg/dL (08 Nov 2021 17:04)  POCT Blood Glucose.: 120 mg/dL (08 Nov 2021 11:43)              RADIOLOGY & ADDITIONAL TESTS:    Imaging Personally Reviewed:  [x ] YES  [ ] NO

## 2021-11-09 NOTE — PROGRESS NOTE ADULT - ASSESSMENT
72yo M w/ PMHx of CAD (s/p CABG 2019), CKD (unknown stage), DM2, Parkinson's Disease, HTN, depression presents with bilateral leg swelling, patient is a poor historian. ABBY on CKD, anticipate need for long term HD. Vascular consulted for AVF    - Patient is RHD, protect Left upper extremity (pink band, no IV sticks or blood draws)  - Will need bilateral upper extremity vein mapping  - Anticipate need Medical and Cardiac clearances  - Plan discussed with son and daughter in law, who are aware and in agreement with plan for AV fistula  will follow

## 2021-11-09 NOTE — SWALLOW VFSS/MBS ASSESSMENT ADULT - DIAGNOSTIC IMPRESSIONS
Patient presents on MBS with grossly functional oropharyngeal swallow. Oral stage is mildly prolonged yet functional across textures trialed. Some repetitive lingual movements to effect bolus transfer of food textures however overall adequate for efficient po intake. In the pharyngeal phase, there is min vallecular residue which is cleared on repeat swallow and/or liquid wash. There is no evidence of laryngeal penetration or aspiration under fluoro.

## 2021-11-09 NOTE — PROGRESS NOTE ADULT - ASSESSMENT
ASSESSMENT/PLAN  70yo M w/ PMHx of CAD (s/p CABG 2019), CKD (unknown stage), DM2, Parkinson's Disease, HTN, depression presents with bilateral leg swelling  ESRD   Severe LV dysfunction ;  A flutter   Confusion      1 IR-  perm cath needed and will need vasc consult for AVG this admission     2 Renal- HD today     3 CVS- BP stable, on Midodrine (with hold parameters in place, SBP> 160)  Lopressor for heart rate control     4 Anemia - Retacrit at HD

## 2021-11-09 NOTE — PROGRESS NOTE ADULT - SUBJECTIVE AND OBJECTIVE BOX
[facility] Cardiology Progress Note  _______________________________    Pt. seen and examined. sleeping.    Telemetry -atrial flutter 70s    T(C): 36.4 (11-09-21 @ 04:00), Max: 36.7 (11-08-21 @ 20:20)  HR: 118 (11-09-21 @ 04:00) (76 - 118)  BP: 160/80 (11-09-21 @ 04:00) (144/83 - 160/80)  RR: 18 (11-09-21 @ 04:00) (18 - 18)  SpO2: 97% (11-09-21 @ 04:00) (96% - 98%)  I&O's Summary    08 Nov 2021 07:01  -  09 Nov 2021 07:00  --------------------------------------------------------  IN: 100 mL / OUT: 0 mL / NET: 100 mL        PHYSICAL EXAM:  GENERAL: NAD.  NECK: Supple.  CHEST/LUNG: Clear to anterior auscultation bilaterally; No significant wheezes, rales, or rhonchi.  HEART: S1 S2 irregular.   ABDOMEN: Soft, Nondistended  EXTREMITIES:  No LE edema.      LABS:                        8.2    12.30 )-----------( 329      ( 09 Nov 2021 05:08 )             26.5     11-09    135  |  96  |  80<H>  ----------------------------<  109<H>  4.4   |  23  |  7.51<H>    Ca    9.1      09 Nov 2021 05:08    TPro  7.2  /  Alb  2.8<L>  /  TBili  1.0  /  DBili  x   /  AST  23  /  ALT  6<L>  /  AlkPhos  113  11-08                  MEDICATIONS  (STANDING):  acetaminophen     Tablet .. 650 milliGRAM(s) Oral every 6 hours  ascorbic acid 500 milliGRAM(s) Oral daily  atorvastatin 80 milliGRAM(s) Oral at bedtime  carbidopa/levodopa  25/100 2.5 Tablet(s) Oral <User Schedule>  carbidopa/levodopa  25/100 2 Tablet(s) Oral <User Schedule>  chlorhexidine 4% Liquid 1 Application(s) Topical <User Schedule>  cholecalciferol 1000 Unit(s) Oral daily  dextrose 40% Gel 15 Gram(s) Oral once  dextrose 5%. 1000 milliLiter(s) (50 mL/Hr) IV Continuous <Continuous>  dextrose 5%. 1000 milliLiter(s) (100 mL/Hr) IV Continuous <Continuous>  dextrose 50% Injectable 25 Gram(s) IV Push once  dextrose 50% Injectable 12.5 Gram(s) IV Push once  dextrose 50% Injectable 25 Gram(s) IV Push once  epoetin mari-epbx (RETACRIT) Injectable 83478 Unit(s) IV Push once  folic acid 1 milliGRAM(s) Oral daily  glucagon  Injectable 1 milliGRAM(s) IntraMuscular once  insulin glargine Injectable (LANTUS) 6 Unit(s) SubCutaneous at bedtime  insulin lispro (ADMELOG) corrective regimen sliding scale   SubCutaneous three times a day before meals  insulin lispro (ADMELOG) corrective regimen sliding scale   SubCutaneous at bedtime  levothyroxine 25 MICROGram(s) Oral daily  metoprolol tartrate 25 milliGRAM(s) Oral two times a day  midodrine 20 milliGRAM(s) Oral <User Schedule>  polyethylene glycol 3350 17 Gram(s) Oral two times a day  QUEtiapine 25 milliGRAM(s) Oral at bedtime  senna Syrup 10 milliLiter(s) Oral at bedtime  sevelamer carbonate Powder 1600 milliGRAM(s) Oral three times a day  trihexyphenidyl 2 milliGRAM(s) Oral three times a day    MEDICATIONS  (PRN):  acetaminophen     Tablet .. 650 milliGRAM(s) Oral every 6 hours PRN Mild Pain (1 - 3)  albuterol/ipratropium for Nebulization 3 milliLiter(s) Nebulizer every 6 hours PRN Shortness of Breath and/or Wheezing  guaiFENesin Oral Liquid (Sugar-Free) 200 milliGRAM(s) Oral every 6 hours PRN Cough  sodium chloride 0.9% lock flush 10 milliLiter(s) IV Push every 1 hour PRN Pre/post blood products, medications, blood draw, and to maintain line patency        RADIOLOGY & ADDITIONAL TESTS:

## 2021-11-09 NOTE — PROGRESS NOTE ADULT - SUBJECTIVE AND OBJECTIVE BOX
Patient is a 71y old  Male who presents with a chief complaint of leg swelling (09 Nov 2021 14:29)      Vascular Surgery Attending Progress Note    Interval HPI: pt w/o new c/o  pt remains confused and delirious     Medications:  acetaminophen     Tablet .. 650 milliGRAM(s) Oral every 6 hours PRN  acetaminophen     Tablet .. 650 milliGRAM(s) Oral every 6 hours  albuterol/ipratropium for Nebulization 3 milliLiter(s) Nebulizer every 6 hours PRN  ascorbic acid 500 milliGRAM(s) Oral daily  atorvastatin 80 milliGRAM(s) Oral at bedtime  carbidopa/levodopa  25/100 2.5 Tablet(s) Oral <User Schedule>  carbidopa/levodopa  25/100 2 Tablet(s) Oral <User Schedule>  chlorhexidine 4% Liquid 1 Application(s) Topical <User Schedule>  cholecalciferol 1000 Unit(s) Oral daily  dextrose 40% Gel 15 Gram(s) Oral once  dextrose 5%. 1000 milliLiter(s) IV Continuous <Continuous>  dextrose 5%. 1000 milliLiter(s) IV Continuous <Continuous>  dextrose 50% Injectable 25 Gram(s) IV Push once  dextrose 50% Injectable 12.5 Gram(s) IV Push once  dextrose 50% Injectable 25 Gram(s) IV Push once  epoetin mari-epbx (RETACRIT) Injectable 08328 Unit(s) IV Push once  folic acid 1 milliGRAM(s) Oral daily  glucagon  Injectable 1 milliGRAM(s) IntraMuscular once  guaiFENesin Oral Liquid (Sugar-Free) 200 milliGRAM(s) Oral every 6 hours PRN  insulin glargine Injectable (LANTUS) 6 Unit(s) SubCutaneous at bedtime  insulin lispro (ADMELOG) corrective regimen sliding scale   SubCutaneous three times a day before meals  insulin lispro (ADMELOG) corrective regimen sliding scale   SubCutaneous at bedtime  levothyroxine 25 MICROGram(s) Oral daily  metoprolol tartrate 25 milliGRAM(s) Oral two times a day  midodrine 20 milliGRAM(s) Oral <User Schedule>  polyethylene glycol 3350 17 Gram(s) Oral two times a day  QUEtiapine 25 milliGRAM(s) Oral at bedtime  senna Syrup 10 milliLiter(s) Oral at bedtime  sevelamer carbonate Powder 1600 milliGRAM(s) Oral three times a day  sodium chloride 0.9% lock flush 10 milliLiter(s) IV Push every 1 hour PRN  trihexyphenidyl 2 milliGRAM(s) Oral three times a day      Vital Signs Last 24 Hrs  T(C): 36.9 (09 Nov 2021 20:24), Max: 36.9 (09 Nov 2021 20:24)  T(F): 98.4 (09 Nov 2021 20:24), Max: 98.4 (09 Nov 2021 20:24)  HR: 81 (09 Nov 2021 20:24) (74 - 118)  BP: 136/63 (09 Nov 2021 20:24) (136/63 - 161/92)  BP(mean): --  RR: 18 (09 Nov 2021 20:24) (18 - 18)  SpO2: 96% (09 Nov 2021 20:24) (96% - 100%)  I&O's Summary    08 Nov 2021 07:01  -  09 Nov 2021 07:00  --------------------------------------------------------  IN: 100 mL / OUT: 0 mL / NET: 100 mL    09 Nov 2021 07:01  -  09 Nov 2021 20:34  --------------------------------------------------------  IN: 100 mL / OUT: 1000 mL / NET: -900 mL        Physical Exam:  Neuro  A&Ox1 VSS  Vascular:  stable       LABS:                        8.2    12.30 )-----------( 329      ( 09 Nov 2021 05:08 )             26.5     11-09    135  |  96  |  80<H>  ----------------------------<  109<H>  4.4   |  23  |  7.51<H>    Ca    9.1      09 Nov 2021 05:08    TPro  7.2  /  Alb  2.8<L>  /  TBili  1.0  /  DBili  x   /  AST  23  /  ALT  6<L>  /  AlkPhos  113  11-08    Joshua UE vein mapping reviewed       EMI TRIPP MD  [phone] Cell [phone]

## 2021-11-09 NOTE — PROGRESS NOTE ADULT - ASSESSMENT
ABBY now on HD  Acute on chronic systolic CHF with fluid overload  Bilateral L>>R pleural effusions  S/P RP bleed on AC   Atrial Flutter/Fib    REC    Monitor hb  HD today

## 2021-11-09 NOTE — PROGRESS NOTE ADULT - SUBJECTIVE AND OBJECTIVE BOX
Follow-up Pulm Progress Note  Brayan Verma MD  [phone]    No new respiratory events overnight.    Breathing comfortably supine on room air  Hb 8.2    Vital Signs Last 24 Hrs  T(C): 36.6 (09 Nov 2021 11:58), Max: 36.7 (08 Nov 2021 20:20)  T(F): 97.9 (09 Nov 2021 11:58), Max: 98 (08 Nov 2021 20:20)  HR: 114 (09 Nov 2021 11:58) (106 - 118)  BP: 146/94 (09 Nov 2021 11:58) (144/83 - 160/80)  BP(mean): --  RR: 18 (09 Nov 2021 11:58) (18 - 18)  SpO2: 96% (09 Nov 2021 11:58) (96% - 98%)                          8.2    12.30 )-----------( 329      ( 09 Nov 2021 05:08 )             26.5       11-09    135  |  96  |  80<H>  ----------------------------<  109<H>  4.4   |  23  |  7.51<H>    Ca    9.1      09 Nov 2021 05:08    TPro  7.2  /  Alb  2.8<L>  /  TBili  1.0  /  DBili  x   /  AST  23  /  ALT  6<L>  /  AlkPhos  113  11-08    Physical Examination:  PULM: Diminished basilar BS  CVS: Regular rate and rhythm, no murmurs, rubs, or gallops  ABD: Soft, non-tender  EXT:  No clubbing, cyanosis, or edema    RADIOLOGY REVIEWED  CXR:    CT chest:    TTE:

## 2021-11-09 NOTE — PROGRESS NOTE ADULT - PROBLEM SELECTOR PLAN 2
-  - h/o CABG.  - medical management given renal function and significant anemia.   - cont ASA to 81 mg.  - cont high intensity statin.

## 2021-11-09 NOTE — PROGRESS NOTE ADULT - SUBJECTIVE AND OBJECTIVE BOX
Chief complaint  Patient is a 71y old  Male who presents with a chief complaint of leg swelling (09 Nov 2021 12:05)   Review of systems  Patient in bed, looks comfortable, no hypoglycemic episodes.    Labs and Fingersticks  CAPILLARY BLOOD GLUCOSE      POCT Blood Glucose.: 152 mg/dL (09 Nov 2021 12:13)  POCT Blood Glucose.: 125 mg/dL (09 Nov 2021 08:00)  POCT Blood Glucose.: 136 mg/dL (08 Nov 2021 21:43)  POCT Blood Glucose.: 141 mg/dL (08 Nov 2021 17:04)      Anion Gap, Serum: 16 (11-09 @ 05:08)  Anion Gap, Serum: 19 *H* (11-08 @ 11:50)      Calcium, Total Serum: 9.1 (11-09 @ 05:08)  Calcium, Total Serum: 9.4 (11-08 @ 11:50)  Albumin, Serum: 2.8 *L* (11-08 @ 11:50)    Alanine Aminotransferase (ALT/SGPT): 6 *L* (11-08 @ 11:50)  Alkaline Phosphatase, Serum: 113 (11-08 @ 11:50)  Aspartate Aminotransferase (AST/SGOT): 23 (11-08 @ 11:50)        11-09    135  |  96  |  80<H>  ----------------------------<  109<H>  4.4   |  23  |  7.51<H>    Ca    9.1      09 Nov 2021 05:08    TPro  7.2  /  Alb  2.8<L>  /  TBili  1.0  /  DBili  x   /  AST  23  /  ALT  6<L>  /  AlkPhos  113  11-08                        8.2    12.30 )-----------( 329      ( 09 Nov 2021 05:08 )             26.5     Medications  MEDICATIONS  (STANDING):  acetaminophen     Tablet .. 650 milliGRAM(s) Oral every 6 hours  ascorbic acid 500 milliGRAM(s) Oral daily  atorvastatin 80 milliGRAM(s) Oral at bedtime  carbidopa/levodopa  25/100 2.5 Tablet(s) Oral <User Schedule>  carbidopa/levodopa  25/100 2 Tablet(s) Oral <User Schedule>  chlorhexidine 4% Liquid 1 Application(s) Topical <User Schedule>  cholecalciferol 1000 Unit(s) Oral daily  dextrose 40% Gel 15 Gram(s) Oral once  dextrose 5%. 1000 milliLiter(s) (50 mL/Hr) IV Continuous <Continuous>  dextrose 5%. 1000 milliLiter(s) (100 mL/Hr) IV Continuous <Continuous>  dextrose 50% Injectable 25 Gram(s) IV Push once  dextrose 50% Injectable 12.5 Gram(s) IV Push once  dextrose 50% Injectable 25 Gram(s) IV Push once  epoetin mari-epbx (RETACRIT) Injectable 56364 Unit(s) IV Push once  folic acid 1 milliGRAM(s) Oral daily  glucagon  Injectable 1 milliGRAM(s) IntraMuscular once  insulin glargine Injectable (LANTUS) 6 Unit(s) SubCutaneous at bedtime  insulin lispro (ADMELOG) corrective regimen sliding scale   SubCutaneous three times a day before meals  insulin lispro (ADMELOG) corrective regimen sliding scale   SubCutaneous at bedtime  levothyroxine 25 MICROGram(s) Oral daily  metoprolol tartrate 25 milliGRAM(s) Oral two times a day  midodrine 20 milliGRAM(s) Oral <User Schedule>  polyethylene glycol 3350 17 Gram(s) Oral two times a day  QUEtiapine 25 milliGRAM(s) Oral at bedtime  senna Syrup 10 milliLiter(s) Oral at bedtime  sevelamer carbonate Powder 1600 milliGRAM(s) Oral three times a day  trihexyphenidyl 2 milliGRAM(s) Oral three times a day      Physical Exam  General: Patient comfortable in bed  Vital Signs Last 12 Hrs  T(F): 97.9 (11-09-21 @ 11:58), Max: 97.9 (11-09-21 @ 11:58)  HR: 103 (11-09-21 @ 12:58) (103 - 118)  BP: 146/94 (11-09-21 @ 11:58) (146/94 - 160/80)  BP(mean): --  RR: 18 (11-09-21 @ 11:58) (18 - 18)  SpO2: 96% (11-09-21 @ 11:58) (96% - 97%)  Neck: No palpable thyroid nodules.  CVS: S1S2, No murmurs  Respiratory: No wheezing, no crepitations  GI: Abdomen soft, bowel sounds positive  Musculoskeletal:  edema lower extremities.   Skin: No skin rashes, no ecchymosis    Diagnostics    Free Thyroxine, Serum: AM Sched. Collection: 06-Nov-2021 06:00 (11-05 @ 13:18)  Free Thyroxine, Serum: AM Sched. Collection: 26-Oct-2021 06:00 (10-25 @ 11:45)  Thyroid Stimulating Hormone, Serum: AM Sched. Collection: 26-Oct-2021 06:00 (10-25 @ 11:45)  Cortisol AM, Serum: 08:00 (10-22 @ 12:47)  Free Thyroxine, Serum: AM Sched. Collection: 23-Oct-2021 06:00 (10-22 @ 12:43)  Thyroid Stimulating Hormone, Serum: AM Sched. Collection: 23-Oct-2021 06:00 (10-22 @ 12:43)           Chief complaint  Patient is a 71y old  Male who presents with a chief complaint of leg swelling (09 Nov 2021 12:05)   Review of systems  Patient in bed, looks comfortable, no hypoglycemic episodes.    Labs and Fingersticks  CAPILLARY BLOOD GLUCOSE      POCT Blood Glucose.: 152 mg/dL (09 Nov 2021 12:13)  POCT Blood Glucose.: 125 mg/dL (09 Nov 2021 08:00)  POCT Blood Glucose.: 136 mg/dL (08 Nov 2021 21:43)  POCT Blood Glucose.: 141 mg/dL (08 Nov 2021 17:04)      Anion Gap, Serum: 16 (11-09 @ 05:08)  Anion Gap, Serum: 19 *H* (11-08 @ 11:50)      Calcium, Total Serum: 9.1 (11-09 @ 05:08)  Calcium, Total Serum: 9.4 (11-08 @ 11:50)  Albumin, Serum: 2.8 *L* (11-08 @ 11:50)    Alanine Aminotransferase (ALT/SGPT): 6 *L* (11-08 @ 11:50)  Alkaline Phosphatase, Serum: 113 (11-08 @ 11:50)  Aspartate Aminotransferase (AST/SGOT): 23 (11-08 @ 11:50)        11-09    135  |  96  |  80<H>  ----------------------------<  109<H>  4.4   |  23  |  7.51<H>    Ca    9.1      09 Nov 2021 05:08    TPro  7.2  /  Alb  2.8<L>  /  TBili  1.0  /  DBili  x   /  AST  23  /  ALT  6<L>  /  AlkPhos  113  11-08                        8.2    12.30 )-----------( 329      ( 09 Nov 2021 05:08 )             26.5     Medications  MEDICATIONS  (STANDING):  acetaminophen     Tablet .. 650 milliGRAM(s) Oral every 6 hours  ascorbic acid 500 milliGRAM(s) Oral daily  atorvastatin 80 milliGRAM(s) Oral at bedtime  carbidopa/levodopa  25/100 2.5 Tablet(s) Oral <User Schedule>  carbidopa/levodopa  25/100 2 Tablet(s) Oral <User Schedule>  chlorhexidine 4% Liquid 1 Application(s) Topical <User Schedule>  cholecalciferol 1000 Unit(s) Oral daily  dextrose 40% Gel 15 Gram(s) Oral once  dextrose 5%. 1000 milliLiter(s) (50 mL/Hr) IV Continuous <Continuous>  dextrose 5%. 1000 milliLiter(s) (100 mL/Hr) IV Continuous <Continuous>  dextrose 50% Injectable 25 Gram(s) IV Push once  dextrose 50% Injectable 12.5 Gram(s) IV Push once  dextrose 50% Injectable 25 Gram(s) IV Push once  epoetin mari-epbx (RETACRIT) Injectable 07050 Unit(s) IV Push once  folic acid 1 milliGRAM(s) Oral daily  glucagon  Injectable 1 milliGRAM(s) IntraMuscular once  insulin glargine Injectable (LANTUS) 6 Unit(s) SubCutaneous at bedtime  insulin lispro (ADMELOG) corrective regimen sliding scale   SubCutaneous three times a day before meals  insulin lispro (ADMELOG) corrective regimen sliding scale   SubCutaneous at bedtime  levothyroxine 25 MICROGram(s) Oral daily  metoprolol tartrate 25 milliGRAM(s) Oral two times a day  midodrine 20 milliGRAM(s) Oral <User Schedule>  polyethylene glycol 3350 17 Gram(s) Oral two times a day  QUEtiapine 25 milliGRAM(s) Oral at bedtime  senna Syrup 10 milliLiter(s) Oral at bedtime  sevelamer carbonate Powder 1600 milliGRAM(s) Oral three times a day  trihexyphenidyl 2 milliGRAM(s) Oral three times a day      Physical Exam  General: Patient comfortable in bed  Vital Signs Last 12 Hrs  T(F): 97.9 (11-09-21 @ 11:58), Max: 97.9 (11-09-21 @ 11:58)  HR: 103 (11-09-21 @ 12:58) (103 - 118)  BP: 146/94 (11-09-21 @ 11:58) (146/94 - 160/80)  BP(mean): --  RR: 18 (11-09-21 @ 11:58) (18 - 18)  SpO2: 96% (11-09-21 @ 11:58) (96% - 97%)  Neck: No palpable thyroid nodules.  CVS: S1S2, No murmurs  Respiratory: No wheezing, no crepitations  GI: Abdomen soft, bowel sounds positive  Musculoskeletal:  edema lower extremities.   Skin: No skin rashes, no ecchymosis    Diagnostics    Free Thyroxine, Serum: AM Sched. Collection: 06-Nov-2021 06:00 (11-05 @ 13:18)  Free Thyroxine, Serum: AM Sched. Collection: 26-Oct-2021 06:00 (10-25 @ 11:45)  Thyroid Stimulating Hormone, Serum: AM Sched. Collection: 26-Oct-2021 06:00 (10-25 @ 11:45)  Cortisol AM, Serum: 08:00 (10-22 @ 12:47)  Free Thyroxine, Serum: AM Sched. Collection: 23-Oct-2021 06:00 (10-22 @ 12:43)  Thyroid Stimulating Hormone, Serum: AM Sched. Collection: 23-Oct-2021 06:00 (10-22 @ 12:43)

## 2021-11-09 NOTE — SWALLOW VFSS/MBS ASSESSMENT ADULT - SLP PERTINENT HISTORY OF CURRENT PROBLEM
72 yo M with PMH of T2DM, Parkinson's, Depression, CABG (2019), CAD s/p stents, ADHF, HTN, CKD admitted 10/21 for Afib/Aflutter RVR, NSTEMI managed on Heparin gtt and started on HD for acute on chronic CKD on 10/27. On 10/27 pt also developed worsening anemia as well as back pain, found to have b/l RP hematoma with active bleeding. Pt transferred to MICU for further management of hemorrhagic anemia.

## 2021-11-09 NOTE — SWALLOW VFSS/MBS ASSESSMENT ADULT - ORAL PHASE COMMENTS
repetitive lingual movements to effect bolus transfer; mildly prolonged yet functional across textures

## 2021-11-09 NOTE — PROGRESS NOTE ADULT - PROBLEM SELECTOR PLAN 3
-  - nephrology following.  - being evaluated for permacath. - given end stage renal disease, new LV dysfunction on echo patient is considered MODERATE TO HIGH risk for procedure from cardiac standpoint. however he is medically optimized. does not appear to be fluid overloaded at this time.

## 2021-11-09 NOTE — PROGRESS NOTE ADULT - ATTENDING COMMENTS
I Tramaine Ferraro MD have seen and examined the patient today and agree with  the  evaluation, assessment and plan of the surgical house officer  ANA CRISTINA Ferraro MD have personally seen and examined the patient at bedside today at  8am

## 2021-11-09 NOTE — SWALLOW VFSS/MBS ASSESSMENT ADULT - NS SWALLOW VFSS REC ASPIR MON
Monitor for s/s aspiration/laryngeal penetration. If noted:  D/C p.o. intake, provide non-oral nutrition/hydration/meds, and contact this service @ [phone]/change of breathing pattern/cough/gurgly voice/fever/pneumonia/throat clearing/upper respiratory infection

## 2021-11-09 NOTE — SWALLOW VFSS/MBS ASSESSMENT ADULT - COMMENTS
MICU course signficant for Delerium on Precedex gtt, stopped 11/1; started on Seroquel; on Sinemet for PD; A/C held i/s/o RP bleed; empiric antibiotic treatment with vanc/zosyn (10/29-11/2) for leukocytosis, infectious w/u has been negative; underwent dialysis c/b hypotension requiring vasopressors and started on midodrine and was able to tolerate HD with 1.4L fluid removed on 11/3. He was found with persistently elevated aPTT and +Lupus anticoagulant with plan per [facility] with supportive care, transfuse FFP if pending procedure/intervention and outpatient followup.   Per Palliative C d/w family, patient's confusion/cognitive status is acute change from PTA; son states that patient was able to care by himself until right before this admission. Son Yunier is an EMS and his wife is a nurse at [employer]; current goals appear to be towards improving acute issues so patient can go back to a levels closer to were he was before this admission.    Seen for initial bedside swallow evaluation 11/4 which found grossly functional oropharyngeal swallow. No overt signs of laryngeal penetration/aspiration; post ICU delirium/confusion noted. Regular diet consistency recommended. Dysphagia follow up 11/8 completed, finding patient awake, alert, fluent verbal output; continues somewhat confused with tangential, disorganized thought. Reduced po intake, although food texture noted to be soft/bite-sized; diet clarified with provider. Inconsistent cough noted on thin liquids; MBS recommended to provide objective assessment in setting of persistent elevated WBC and chest imaging, to r/o aspiration.

## 2021-11-10 NOTE — PROGRESS NOTE ADULT - SUBJECTIVE AND OBJECTIVE BOX
NEPHROLOGY-[facility] [phone]        Patient seen and examined in bed.  He was confused         MEDICATIONS  (STANDING):  acetaminophen     Tablet .. 650 milliGRAM(s) Oral every 6 hours  ascorbic acid 500 milliGRAM(s) Oral daily  atorvastatin 80 milliGRAM(s) Oral at bedtime  carbidopa/levodopa  25/100 2.5 Tablet(s) Oral <User Schedule>  carbidopa/levodopa  25/100 2 Tablet(s) Oral <User Schedule>  chlorhexidine 4% Liquid 1 Application(s) Topical <User Schedule>  cholecalciferol 1000 Unit(s) Oral daily  dextrose 40% Gel 15 Gram(s) Oral once  dextrose 5%. 1000 milliLiter(s) (50 mL/Hr) IV Continuous <Continuous>  dextrose 5%. 1000 milliLiter(s) (100 mL/Hr) IV Continuous <Continuous>  dextrose 50% Injectable 25 Gram(s) IV Push once  dextrose 50% Injectable 12.5 Gram(s) IV Push once  dextrose 50% Injectable 25 Gram(s) IV Push once  epoetin mari-epbx (RETACRIT) Injectable 67430 Unit(s) IV Push once  folic acid 1 milliGRAM(s) Oral daily  glucagon  Injectable 1 milliGRAM(s) IntraMuscular once  insulin glargine Injectable (LANTUS) 6 Unit(s) SubCutaneous at bedtime  insulin lispro (ADMELOG) corrective regimen sliding scale   SubCutaneous three times a day before meals  insulin lispro (ADMELOG) corrective regimen sliding scale   SubCutaneous at bedtime  levothyroxine 25 MICROGram(s) Oral daily  metoprolol tartrate 25 milliGRAM(s) Oral two times a day  midodrine 20 milliGRAM(s) Oral <User Schedule>  polyethylene glycol 3350 17 Gram(s) Oral two times a day  QUEtiapine 25 milliGRAM(s) Oral at bedtime  senna Syrup 10 milliLiter(s) Oral at bedtime  sevelamer carbonate Powder 1600 milliGRAM(s) Oral three times a day  trihexyphenidyl 2 milliGRAM(s) Oral three times a day      VITAL:  T(C): , Max: 36.9 (11-09-21 @ 20:24)  T(F): , Max: 98.4 (11-09-21 @ 20:24)  HR: 105 (11-10-21 @ 05:04)  BP: 144/84 (11-10-21 @ 05:04)  BP(mean): --  RR: 18 (11-10-21 @ 05:04)  SpO2: 95% (11-10-21 @ 05:04)  Wt(kg): --    I and O's:    11-09 @ 07:01  -  11-10 @ 07:00  --------------------------------------------------------  IN: 100 mL / OUT: 1000 mL / NET: -900 mL          PHYSICAL EXAM:    Constitutional: NAD  Neck:  No JVD  Respiratory: CTAB/L  Cardiovascular: S1 and S2  Gastrointestinal: BS+, soft, NT/ND  Extremities: + peripheral edema  Neurological:  , no focal deficits  Psychiatric:    : No Javier  Skin: No rashes  Access: [facility]     LABS:                        8.1    12.34 )-----------( 342      ( 10 Nov 2021 06:00 )             26.0     11-10    136  |  97  |  59<H>  ----------------------------<  107<H>  4.3   |  23  |  6.16<H>    Ca    9.4      10 Nov 2021 05:58    TPro  7.2  /  Alb  2.8<L>  /  TBili  1.0  /  DBili  x   /  AST  23  /  ALT  6<L>  /  AlkPhos  113  11-08          Urine Studies:          RADIOLOGY & ADDITIONAL STUDIES:

## 2021-11-10 NOTE — PROGRESS NOTE ADULT - ASSESSMENT
ASSESSMENT/PLAN  70yo M w/ PMHx of CAD (s/p CABG 2019), CKD (unknown stage), DM2, Parkinson's Disease, HTN, depression presents with bilateral leg swelling  ESRD   Severe LV dysfunction ;  A flutter   Confusion      1 IR-  perm cath needed and call placed to IR.  Vasc eval noted and appretiated     2 Renal- HD in am     3 CVS- BP stable, on Midodrine (with hold parameters in place, SBP> 160)  Lopressor for heart rate control     4 Anemia - Retacrit at HD     Sayed [facility]   [phone]

## 2021-11-10 NOTE — PROGRESS NOTE ADULT - SUBJECTIVE AND OBJECTIVE BOX
Patient is a 71y Male     Patient is a 71y old  Male who presents with a chief complaint of leg swelling (10 Nov 2021 09:43)      HPI:  70yo M w/ PMHx of CAD (s/p CABG 2019), CKD (unknown stage), DM2, Parkinson's Disease, HTN, depression presents with bilateral leg swelling, patient's daughter in-law at bedside Yoly Quintero (4 [employer] [facility] Nurse) provides the history, the daughter reports the patient is a poor historian, unable to remember having conversations with others and likely cannot weigh risk/benefits of medical conditions, she reports that he has had bilateral lower extremity swelling for the past few months, but over the past 2 weeks it has significantly worsened, he has further difficulty ambulating due to swelling, his outpatient provider attempted to manage with 20mg lasix and then increased to 40mg lasix but the patient never took the increased dose, his symptoms ar persistent throughout the day and are extremely severe, he has developed wounds of the legs with weeping of fluid due to the swelling, she reports that the patient is frequently non-compliant with medications and medical management, he lives at home with his son and daughter in law, he denies chest pain, shortness of breath, fever/chills, cough, sputum, in the ED, he was tachycardic but hemodynamically stable, afebrile, saturating well on room air, labs were significant for elevated BNP, elevated creatinine, imaging showed moderate left pleural effusion, patient was admitted to general medicine for further management  (21 Oct 2021 07:06)      PAST MEDICAL & SURGICAL HISTORY:  Hypertension    Diabetes Mellitus, Type 2    Hypercholesterolemia    Parkinson disease    CAD (coronary artery disease)  s/p 1 stent in 2010 and CABG 2019    S/P CABG (coronary artery bypass graft)  2019    S/P hip replacement, right  2016    Anxiety    Depression, major        MEDICATIONS  (STANDING):  acetaminophen     Tablet .. 650 milliGRAM(s) Oral every 6 hours  ascorbic acid 500 milliGRAM(s) Oral daily  atorvastatin 80 milliGRAM(s) Oral at bedtime  carbidopa/levodopa  25/100 2.5 Tablet(s) Oral <User Schedule>  carbidopa/levodopa  25/100 2 Tablet(s) Oral <User Schedule>  chlorhexidine 4% Liquid 1 Application(s) Topical <User Schedule>  cholecalciferol 1000 Unit(s) Oral daily  dextrose 40% Gel 15 Gram(s) Oral once  dextrose 5%. 1000 milliLiter(s) (50 mL/Hr) IV Continuous <Continuous>  dextrose 5%. 1000 milliLiter(s) (100 mL/Hr) IV Continuous <Continuous>  dextrose 50% Injectable 25 Gram(s) IV Push once  dextrose 50% Injectable 12.5 Gram(s) IV Push once  dextrose 50% Injectable 25 Gram(s) IV Push once  epoetin mari-epbx (RETACRIT) Injectable 11620 Unit(s) IV Push once  folic acid 1 milliGRAM(s) Oral daily  glucagon  Injectable 1 milliGRAM(s) IntraMuscular once  insulin glargine Injectable (LANTUS) 6 Unit(s) SubCutaneous at bedtime  insulin lispro (ADMELOG) corrective regimen sliding scale   SubCutaneous three times a day before meals  insulin lispro (ADMELOG) corrective regimen sliding scale   SubCutaneous at bedtime  levothyroxine 25 MICROGram(s) Oral daily  metoprolol tartrate 25 milliGRAM(s) Oral two times a day  midodrine 20 milliGRAM(s) Oral <User Schedule>  polyethylene glycol 3350 17 Gram(s) Oral two times a day  QUEtiapine 25 milliGRAM(s) Oral at bedtime  senna Syrup 10 milliLiter(s) Oral at bedtime  sevelamer carbonate Powder 1600 milliGRAM(s) Oral three times a day  trihexyphenidyl 2 milliGRAM(s) Oral three times a day      Allergies    adhesives (Rash)  latex (Urticaria)  No Known Drug Allergies    Intolerances        SOCIAL HISTORY:  Denies ETOh,Smoking,     FAMILY HISTORY:  Family history of hypertension    Family history of malaria    Family history of pulmonary fibrosis (Sibling)        REVIEW OF SYSTEMS:    CONSTITUTIONAL: No weakness, fevers or chills  EYES/ENT: No visual changes;  No vertigo or throat pain   NECK: No pain or stiffness  RESPIRATORY: No cough, wheezing, hemoptysis; No shortness of breath  CARDIOVASCULAR: No chest pain or palpitations  GASTROINTESTINAL: No abdominal or epigastric pain. No nausea, vomiting, or hematemesis; No diarrhea or constipation. No melena or hematochezia.  GENITOURINARY: No dysuria, frequency or hematuria  NEUROLOGICAL: No numbness or weakness  SKIN: No itching, burning, rashes, or lesions   All other review of systems is negative unless indicated above.    VITAL:  T(C): , Max: 36.9 (11-09-21 @ 20:24)  T(F): , Max: 98.4 (11-09-21 @ 20:24)  HR: 105 (11-10-21 @ 05:04)  BP: 144/84 (11-10-21 @ 05:04)  BP(mean): --  RR: 18 (11-10-21 @ 05:04)  SpO2: 95% (11-10-21 @ 05:04)  Wt(kg): --    I and O's:    11-08 @ 07:01  -  11-09 @ 07:00  --------------------------------------------------------  IN: 100 mL / OUT: 0 mL / NET: 100 mL    11-09 @ 07:01  -  11-10 @ 07:00  --------------------------------------------------------  IN: 100 mL / OUT: 1000 mL / NET: -900 mL          PHYSICAL EXAM:    Constitutional: NAD  HEENT: PERRLA,   Neck: No JVD  Respiratory: CTA B/L  Cardiovascular: S1 and S2  Gastrointestinal: BS+, soft, NT/ND  Extremities: No peripheral edema  Neurological: A/O x 3, no focal deficits  Psychiatric: Normal mood, normal affect  : No Javier  Skin: No rashes  Access: Not applicable  Back: No CVA tenderness    LABS:                        8.1    12.34 )-----------( 342      ( 10 Nov 2021 06:00 )             26.0     11-10    136  |  97  |  59<H>  ----------------------------<  107<H>  4.3   |  23  |  6.16<H>    Ca    9.4      10 Nov 2021 05:58    TPro  7.2  /  Alb  2.8<L>  /  TBili  1.0  /  DBili  x   /  AST  23  /  ALT  6<L>  /  AlkPhos  113  11-08          RADIOLOGY & ADDITIONAL STUDIES:

## 2021-11-10 NOTE — PROGRESS NOTE ADULT - ASSESSMENT
Assessment  DMT2: 71y Male with DM T2 with hyperglycemia, A1C 6.4%, was on insulin at home, now on low-dose basal insulin and coverage, blood sugars are stable and trending within acceptable range, no hypoglycemic episodes, eating partial meals, appears comfortable.  Hypothyroidism: Patient has no history thyroid disease, was not on any thyroid supplements, subclinical with low-normal FT4, lethargic, started on synthroid 25 mcg po daily, FT4 improved to 1.2.  CHF: on medications, stable, monitored.  HTN: Controlled,  on antihypertensive medications.  Parkinsons: on meds, monitored.  CKD: Monitor labs/BMP      Kelsie Reece MD  Cell: [phone]  Office: [phone]     Assessment  DMT2: 71y Male with DM T2 with hyperglycemia, A1C 6.4%, was on insulin at home, now on low-dose basal insulin and coverage, blood sugars are stable and trending within acceptable range, no hypoglycemic  episodes, eating partial meals, appears comfortable.  Hypothyroidism: Patient has no history thyroid disease, was not on any thyroid supplements, subclinical with low-normal FT4, lethargic, started on synthroid 25 mcg po daily, FT4 improved to 1.2.  CHF: on medications, stable, monitored.  HTN: Controlled,  on antihypertensive medications.  Parkinsons: on meds, monitored.  CKD: Monitor labs/BMP      Kelsie Reece MD  Cell: [phone]  Office: [phone]

## 2021-11-10 NOTE — PROGRESS NOTE ADULT - ASSESSMENT
Subjective     Kandis Rosario is a 40 year old female who presents to clinic today for the following health issues:    HPI   New Patient/Transfer of Care  At this time, past medical history, current medications, allergies and drug sensitivities, immunizations, habits and life style, family history, and social history are reviewed and updated. Due for the pneumococcal vaccine as she does smoke. Willing to get.       Hypertension Follow-up      Do you check your blood pressure regularly outside of the clinic? Yes     Are you following a low salt diet? No    Are your blood pressures ever more than 140 on the top number (systolic) OR more   than 90 on the bottom number (diastolic), for example 140/90? No   -She does smoke. No interest in quitting.   -She currently takes hydrochlorothiazide and acebutolol without side effects.   -She denies chest pain, shortness of breath, dizziness, syncope, or palpitations. No headaches. No lower leg edema.     Depression and Anxiety Follow-Up    How are you doing with your depression since your last visit? Improved     How are you doing with your anxiety since your last visit?  Improved     Are you having other symptoms that might be associated with depression or anxiety? No    Have you had a significant life event? Grief or Loss, father recently      Do you have any concerns with your use of alcohol or other drugs? No     She does not feel suicidal.     Currently taking nothing and doing well.     H/O Psoriatic Arthritis: feels this is well controlled with medications. Occasionally has pain in her hands, but she notes that it is tolerable. Did see rheumatology in the past, but declines to at this time as she is doing well. She continues with psoriasis, but notes that this is stable. Has not gotten worse.     She requests an EpiPen. She is allergic to seafood and is going to [place].     She does suffer from GERD. Feels this is well controlled with 20 mg of omeprazole once daily. Will  covering dr. ovalle, no acute gi complaints  rpb occasionally take it twice if her symptoms increase. She notes that certain foods make it worse and therefore tries to avoid them. No melena. No abdominal pain as long as she takes the medication. She has never tried Zantac.      Genital Herpes-stable, no recent outbreak; currently taking 500 mg once daily without concern. If she does have an outbreak, she takes 400 mg TID and it clears. No abnormal vaginal discharge.     Social History     Tobacco Use     Smoking status: Current Some Day Smoker     Packs/day: 0.25     Years: 15.00     Pack years: 3.75     Types: Cigarettes     Smokeless tobacco: Never Used     Tobacco comment: no passive exposure   Substance Use Topics     Alcohol use: Yes     Comment: 2 beers twice a week     Drug use: No     PHQ 2017   PHQ-9 Total Score 24   Q9: Thoughts of better off dead/self-harm past 2 weeks Several days     DIOR-7 SCORE 2017   Total Score 18     Patient Active Problem List   Diagnosis     Psoriasis     Migraines     Vaginal bleeding     Sebaceous cyst     Adjustment disorder with mixed anxiety and depressed mood     Essential hypertension     Anxiety     Herpes simplex vulvovaginitis     Past Surgical History:   Procedure Laterality Date      SECTION       DILATE CERVIX, HYSTEROSCOPY, ABLATE ENDOMETRIUM, COMBINED N/A 2015    Procedure: COMBINED DILATE CERVIX, HYSTEROSCOPY, ABLATE ENDOMETRIUM;  Surgeon: Shade Maldonado MD;  Location: [facility] OR     DILATION AND CURETTAGE, HYSTEROSCOPY DIAGNOSTIC, COMBINED       leep x2       REPAIR HAMMER TOE BILATERAL Bilateral 2016    Procedure: REPAIR HAMMER TOE BILATERAL;  Surgeon: Juarez Cruz DPM;  Location: [facility] OR     TONSILLECTOMY & ADENOIDECTOMY      Tonsillitis       Social History     Tobacco Use     Smoking status: Current Some Day Smoker     Packs/day: 0.25     Years: 15.00     Pack years: 3.75     Types: Cigarettes     Smokeless tobacco: Never Used     Tobacco comment: no passive exposure   Substance  Use Topics     Alcohol use: Yes     Comment: 2 beers twice a week     Family History   Problem Relation Age of Onset     Genitourinary Problems Father         kidney stones     Bipolar Disorder Father      Other - See Comments Father         OCD     Hypertension Father      Hyperlipidemia Father      Hypertension Mother      Other - See Comments Mother         migraines     Cancer Mother 57        leiomyosarcoma/uterine cancer mets to lungs     Other - See Comments Sister         anorexia nervosa         Current Outpatient Medications   Medication Sig Dispense Refill     acebutolol (SECTRAL) 200 MG capsule Take 200 mg by mouth daily       EPINEPHrine (EPIPEN) 0.3 MG/0.3ML injection Inject 0.3 mg into the muscle once as needed.       EPINEPHrine (EPIPEN/ADRENACLICK/OR ANY BX GENERIC EQUIV) 0.3 MG/0.3ML injection 2-pack Inject 0.3 mLs (0.3 mg) into the muscle as needed for anaphylaxis 2 each 0     hydrochlorothiazide (MICROZIDE) 12.5 MG capsule Take 1 capsule (12.5 mg) by mouth daily 90 capsule 3     ibuprofen (ADVIL) 200 MG capsule Take 200 mg by mouth every 4 hours as needed.       omeprazole (PRILOSEC) 20 MG DR capsule Take 2 capsules (40 mg) by mouth 2 times daily 180 capsule 3     valACYclovir (VALTREX) 500 MG tablet Take 1 tablet (500 mg) by mouth daily 90 tablet 3     acyclovir (ZOVIRAX) 400 MG tablet Take 1 tablet (400 mg) by mouth 3 times daily 30 tablet 4     Allergies   Allergen Reactions     Lisinopril Cough     Seafood Swelling     Levaquin [Levofloxacin] Cramps       Reviewed and updated as needed this visit by Provider         Review of Systems   As noted in the HPI.       Objective    /86 (BP Location: Left arm, Patient Position: Sitting, Cuff Size: Adult Regular)   Pulse 85   Temp 98.3  F (36.8  C) (Tympanic)   Resp 16   Wt 72.1 kg (159 lb)   LMP 02/14/2015   SpO2 97%   BMI 24.00 kg/m    Body mass index is 24 kg/m .  Physical Exam   GENERAL: healthy, alert and no distress  EYES: Eyes  grossly normal to inspection, PERRL and conjunctivae and sclerae normal  HENT: ear canals and TM's normal, nose and mouth without ulcers or lesions  NECK: no adenopathy, no asymmetry, masses, or scars and thyroid normal to palpation  RESP: lungs clear to auscultation - no rales, rhonchi or wheezes  CV: regular rate and rhythm, normal S1 S2, no S3 or S4, no murmur, click or rub, no peripheral edema and peripheral pulses strong  NEURO: Normal strength and tone, mentation intact and speech normal  PSYCH: mentation appears normal, affect normal/bright  SKIN: patient with erythematous patches on legs and arms consistent with psoriatic guttate     Diagnostic Test Results:  none         Assessment & Plan   (Z76.89) Encounter to establish care  (primary encounter diagnosis)  Plan: Patient is in need of a new provider. she has been explained the role of a CNP and the fact that I do not follow patients in the hospital. she was told that should he get admitted, he would then be followed by a hospitalist. she verbalizes understanding and would like to establish a relationship now. Will update her pneumococcal vaccine.     (I10) Essential hypertension  Plan: Well controlled. Continue current medications. Encouraged daily exercise and a low sodium diet. Recommended checking BP's 2x/wk, call the clinic if consistantly s>140 or d>90. Follow up in 6 months.     (L40.50) Psoriatic arthritis (H)  Comment: stable  Plan: If worsens, will refer to rheumatology.     (F43.23) Adjustment disorder with mixed anxiety and depressed mood  Comment: stable  Plan: f/u with new or worsening symptoms.     (Z72.0) Tobacco abuse  (Z71.6) Tobacco abuse counseling  Plan: Cessation encouraged.     (A60.04) Herpes simplex vulvovaginitis  Comment: stable  Plan: valACYclovir (VALTREX) 500 MG tablet,         Comprehensive metabolic panel        Continue the Valtrex. Will check liver function. Will notify patient of the results when available and intervene  accordingly.     (K21.9) Gastroesophageal reflux disease, esophagitis presence not specified  Comment: stable with omeprazole  Plan: omeprazole (PRILOSEC) 20 MG DR capsule        Will continue, she was made aware of the side effects. May consider trail of Zantac in the future.     (T78.2XXA) Anaphylaxis, initial encounter  Comment: allergic to shellfish  Plan: EPINEPHrine (EPIPEN/ADRENACLICK/OR ANY BX         GENERIC EQUIV) 0.3 MG/0.3ML injection 2-pack    (L40.9) Psoriasis  Comment: stable  Plan: Follow up new or worsening symptoms.     (Z13.220) Lipid screening  Comment: not fasting today  Plan: Lipid Profile        Will return fasting for labs. Will notify patient of the results when available and intervene accordingly.     (Z12.31) Encounter for screening mammogram for breast cancer  Plan: MA Screen Bilateral w/Cb        Will notify patient of the results when available and intervene accordingly.     (Z23) Need for pneumococcal vaccination  Plan: PNEUMOCOCCAL VACCINE,ADULT,SQ OR IM         Tobacco Cessation:   reports that she has been smoking cigarettes. She has a 3.75 pack-year smoking history. She has never used smokeless tobacco.  Tobacco Cessation Action Plan: Information offered: Patient not interested at this time      Christine Garcia NP  [facility] [place]

## 2021-11-10 NOTE — PROGRESS NOTE ADULT - SUBJECTIVE AND OBJECTIVE BOX
Chief complaint  Patient is a 71y old  Male who presents with a chief complaint of leg swelling (10 Nov 2021 13:36)   Review of systems  Patient in bed, looks comfortable, no hypoglycemic episodes.    Labs and Fingersticks  CAPILLARY BLOOD GLUCOSE      POCT Blood Glucose.: 128 mg/dL (10 Nov 2021 11:59)  POCT Blood Glucose.: 164 mg/dL (10 Nov 2021 07:40)  POCT Blood Glucose.: 189 mg/dL (09 Nov 2021 21:04)  POCT Blood Glucose.: 142 mg/dL (09 Nov 2021 17:36)      Anion Gap, Serum: 16 (11-10 @ 05:58)  Anion Gap, Serum: 16 (11-09 @ 05:08)      Calcium, Total Serum: 9.4 (11-10 @ 05:58)  Calcium, Total Serum: 9.1 (11-09 @ 05:08)          11-10    136  |  97  |  59<H>  ----------------------------<  107<H>  4.3   |  23  |  6.16<H>    Ca    9.4      10 Nov 2021 05:58                          8.1    12.34 )-----------( 342      ( 10 Nov 2021 06:00 )             26.0     Medications  MEDICATIONS  (STANDING):  acetaminophen     Tablet .. 650 milliGRAM(s) Oral every 6 hours  ascorbic acid 500 milliGRAM(s) Oral daily  atorvastatin 80 milliGRAM(s) Oral at bedtime  carbidopa/levodopa  25/100 2.5 Tablet(s) Oral <User Schedule>  carbidopa/levodopa  25/100 2 Tablet(s) Oral <User Schedule>  chlorhexidine 4% Liquid 1 Application(s) Topical <User Schedule>  cholecalciferol 1000 Unit(s) Oral daily  dextrose 40% Gel 15 Gram(s) Oral once  dextrose 5%. 1000 milliLiter(s) (50 mL/Hr) IV Continuous <Continuous>  dextrose 5%. 1000 milliLiter(s) (100 mL/Hr) IV Continuous <Continuous>  dextrose 50% Injectable 25 Gram(s) IV Push once  dextrose 50% Injectable 12.5 Gram(s) IV Push once  dextrose 50% Injectable 25 Gram(s) IV Push once  epoetin mari-epbx (RETACRIT) Injectable 32767 Unit(s) IV Push once  folic acid 1 milliGRAM(s) Oral daily  glucagon  Injectable 1 milliGRAM(s) IntraMuscular once  insulin glargine Injectable (LANTUS) 6 Unit(s) SubCutaneous at bedtime  insulin lispro (ADMELOG) corrective regimen sliding scale   SubCutaneous three times a day before meals  insulin lispro (ADMELOG) corrective regimen sliding scale   SubCutaneous at bedtime  levothyroxine 25 MICROGram(s) Oral daily  metoprolol tartrate 25 milliGRAM(s) Oral two times a day  midodrine 20 milliGRAM(s) Oral <User Schedule>  polyethylene glycol 3350 17 Gram(s) Oral two times a day  QUEtiapine 25 milliGRAM(s) Oral at bedtime  senna Syrup 10 milliLiter(s) Oral at bedtime  sevelamer carbonate Powder 1600 milliGRAM(s) Oral three times a day  trihexyphenidyl 2 milliGRAM(s) Oral three times a day      Physical Exam  General: Patient comfortable in bed  Vital Signs Last 12 Hrs  T(F): 97.8 (11-10-21 @ 11:03), Max: 98.2 (11-10-21 @ 05:04)  HR: 69 (11-10-21 @ 11:03) (69 - 105)  BP: 155/79 (11-10-21 @ 11:03) (144/84 - 155/79)  BP(mean): --  RR: 18 (11-10-21 @ 11:03) (18 - 18)  SpO2: 95% (11-10-21 @ 11:03) (95% - 95%)  Neck: No palpable thyroid nodules.  CVS: S1S2, No murmurs  Respiratory: No wheezing, no crepitations  GI: Abdomen soft, bowel sounds positive  Musculoskeletal:  edema lower extremities.   Skin: No skin rashes, no ecchymosis    Diagnostics    Free Thyroxine, Serum: AM Sched. Collection: 06-Nov-2021 06:00 (11-05 @ 13:18)  Free Thyroxine, Serum: AM Sched. Collection: 26-Oct-2021 06:00 (10-25 @ 11:45)  Thyroid Stimulating Hormone, Serum: AM Sched. Collection: 26-Oct-2021 06:00 (10-25 @ 11:45)  Cortisol AM, Serum: 08:00 (10-22 @ 12:47)  Free Thyroxine, Serum: AM Sched. Collection: 23-Oct-2021 06:00 (10-22 @ 12:43)  Thyroid Stimulating Hormone, Serum: AM Sched. Collection: 23-Oct-2021 06:00 (10-22 @ 12:43)           Chief complaint  Patient is a 71y old  Male who presents with a chief complaint of leg swelling (10 Nov 2021 13:36)   Review of systems  Patient in bed, looks comfortable, no hypoglycemic episodes.    Labs and Fingersticks  CAPILLARY BLOOD GLUCOSE      POCT Blood Glucose.: 128 mg/dL (10 Nov 2021 11:59)  POCT Blood Glucose.: 164 mg/dL (10 Nov 2021 07:40)  POCT Blood Glucose.: 189 mg/dL (09 Nov 2021 21:04)  POCT Blood Glucose.: 142 mg/dL (09 Nov 2021 17:36)      Anion Gap, Serum: 16 (11-10 @ 05:58)  Anion Gap, Serum: 16 (11-09 @ 05:08)      Calcium, Total Serum: 9.4 (11-10 @ 05:58)  Calcium, Total Serum: 9.1 (11-09 @ 05:08)          11-10    136  |  97  |  59<H>  ----------------------------<  107<H>  4.3   |  23  |  6.16<H>    Ca    9.4      10 Nov 2021 05:58                          8.1    12.34 )-----------( 342      ( 10 Nov 2021 06:00 )             26.0     Medications  MEDICATIONS  (STANDING):  acetaminophen     Tablet .. 650 milliGRAM(s) Oral every 6 hours  ascorbic acid 500 milliGRAM(s) Oral daily  atorvastatin 80 milliGRAM(s) Oral at bedtime  carbidopa/levodopa  25/100 2.5 Tablet(s) Oral <User Schedule>  carbidopa/levodopa  25/100 2 Tablet(s) Oral <User Schedule>  chlorhexidine 4% Liquid 1 Application(s) Topical <User Schedule>  cholecalciferol 1000 Unit(s) Oral daily  dextrose 40% Gel 15 Gram(s) Oral once  dextrose 5%. 1000 milliLiter(s) (50 mL/Hr) IV Continuous <Continuous>  dextrose 5%. 1000 milliLiter(s) (100 mL/Hr) IV Continuous <Continuous>  dextrose 50% Injectable 25 Gram(s) IV Push once  dextrose 50% Injectable 12.5 Gram(s) IV Push once  dextrose 50% Injectable 25 Gram(s) IV Push once  epoetin mari-epbx (RETACRIT) Injectable 18048 Unit(s) IV Push once  folic acid 1 milliGRAM(s) Oral daily  glucagon  Injectable 1 milliGRAM(s) IntraMuscular once  insulin glargine Injectable (LANTUS) 6 Unit(s) SubCutaneous at bedtime  insulin lispro (ADMELOG) corrective regimen sliding scale   SubCutaneous three times a day before meals  insulin lispro (ADMELOG) corrective regimen sliding scale   SubCutaneous at bedtime  levothyroxine 25 MICROGram(s) Oral daily  metoprolol tartrate 25 milliGRAM(s) Oral two times a day  midodrine 20 milliGRAM(s) Oral <User Schedule>  polyethylene glycol 3350 17 Gram(s) Oral two times a day  QUEtiapine 25 milliGRAM(s) Oral at bedtime  senna Syrup 10 milliLiter(s) Oral at bedtime  sevelamer carbonate Powder 1600 milliGRAM(s) Oral three times a day  trihexyphenidyl 2 milliGRAM(s) Oral three times a day      Physical Exam  General: Patient comfortable in bed  Vital Signs Last 12 Hrs  T(F): 97.8 (11-10-21 @ 11:03), Max: 98.2 (11-10-21 @ 05:04)  HR: 69 (11-10-21 @ 11:03) (69 - 105)  BP: 155/79 (11-10-21 @ 11:03) (144/84 - 155/79)  BP(mean): --  RR: 18 (11-10-21 @ 11:03) (18 - 18)  SpO2: 95% (11-10-21 @ 11:03) (95% - 95%)  Neck: No palpable thyroid nodules.  CVS: S1S2, No murmurs  Respiratory: No wheezing, no crepitations  GI: Abdomen soft, bowel sounds positive  Musculoskeletal:  edema lower extremities.   Skin: No skin rashes, no ecchymosis    Diagnostics    Free Thyroxine, Serum: AM Sched. Collection: 06-Nov-2021 06:00 (11-05 @ 13:18)  Free Thyroxine, Serum: AM Sched. Collection: 26-Oct-2021 06:00 (10-25 @ 11:45)  Thyroid Stimulating Hormone, Serum: AM Sched. Collection: 26-Oct-2021 06:00 (10-25 @ 11:45)  Cortisol AM, Serum: 08:00 (10-22 @ 12:47)  Free Thyroxine, Serum: AM Sched. Collection: 23-Oct-2021 06:00 (10-22 @ 12:43)  Thyroid Stimulating Hormone, Serum: AM Sched. Collection: 23-Oct-2021 06:00 (10-22 @ 12:43)

## 2021-11-10 NOTE — PROGRESS NOTE ADULT - SUBJECTIVE AND OBJECTIVE BOX
Patient is a 71y old  Male who presents with a chief complaint of leg swelling (10 Nov 2021 11:11)      SUBJECTIVE / OVERNIGHT EVENTS:    Patient seen and examined. no cp sob. co left hip pain. felt confused earlier but now better.       Vital Signs Last 24 Hrs  T(C): 36.6 (10 Nov 2021 11:03), Max: 36.9 (09 Nov 2021 20:24)  T(F): 97.8 (10 Nov 2021 11:03), Max: 98.4 (09 Nov 2021 20:24)  HR: 69 (10 Nov 2021 11:03) (69 - 117)  BP: 155/79 (10 Nov 2021 11:03) (136/63 - 161/92)  BP(mean): --  RR: 18 (10 Nov 2021 11:03) (18 - 18)  SpO2: 95% (10 Nov 2021 11:03) (95% - 100%)  I&O's Summary    09 Nov 2021 07:01  -  10 Nov 2021 07:00  --------------------------------------------------------  IN: 100 mL / OUT: 1000 mL / NET: -900 mL    10 Nov 2021 07:01  -  10 Nov 2021 13:36  --------------------------------------------------------  IN: 0 mL / OUT: 0 mL / NET: 0 mL        PE:  GENERAL: NAD, AAOx2  HEAD:  Atraumatic, Normocephalic, right neck shiley  CHEST/LUNG: CTABL, No wheeze  HEART: Regular rate and rhythm; no murmur  ABDOMEN: Soft, Nontender, Nondistended; Bowel sounds present  EXTREMITIES:  2+ Peripheral Pulses, +1 le edema, bl wmittens  NEURO: No focal deficits    LABS:                        8.1    12.34 )-----------( 342      ( 10 Nov 2021 06:00 )             26.0     11-10    136  |  97  |  59<H>  ----------------------------<  107<H>  4.3   |  23  |  6.16<H>    Ca    9.4      10 Nov 2021 05:58        CAPILLARY BLOOD GLUCOSE      POCT Blood Glucose.: 128 mg/dL (10 Nov 2021 11:59)  POCT Blood Glucose.: 164 mg/dL (10 Nov 2021 07:40)  POCT Blood Glucose.: 189 mg/dL (09 Nov 2021 21:04)  POCT Blood Glucose.: 142 mg/dL (09 Nov 2021 17:36)            RADIOLOGY & ADDITIONAL TESTS:    Imaging Personally Reviewed:  [x] YES  [ ] NO    Consultant(s) Notes Reviewed:  [x] YES  [ ] NO    MEDICATIONS  (STANDING):  acetaminophen     Tablet .. 650 milliGRAM(s) Oral every 6 hours  ascorbic acid 500 milliGRAM(s) Oral daily  atorvastatin 80 milliGRAM(s) Oral at bedtime  carbidopa/levodopa  25/100 2.5 Tablet(s) Oral <User Schedule>  carbidopa/levodopa  25/100 2 Tablet(s) Oral <User Schedule>  chlorhexidine 4% Liquid 1 Application(s) Topical <User Schedule>  cholecalciferol 1000 Unit(s) Oral daily  dextrose 40% Gel 15 Gram(s) Oral once  dextrose 5%. 1000 milliLiter(s) (50 mL/Hr) IV Continuous <Continuous>  dextrose 5%. 1000 milliLiter(s) (100 mL/Hr) IV Continuous <Continuous>  dextrose 50% Injectable 25 Gram(s) IV Push once  dextrose 50% Injectable 12.5 Gram(s) IV Push once  dextrose 50% Injectable 25 Gram(s) IV Push once  epoetin mari-epbx (RETACRIT) Injectable 62250 Unit(s) IV Push once  folic acid 1 milliGRAM(s) Oral daily  glucagon  Injectable 1 milliGRAM(s) IntraMuscular once  insulin glargine Injectable (LANTUS) 6 Unit(s) SubCutaneous at bedtime  insulin lispro (ADMELOG) corrective regimen sliding scale   SubCutaneous three times a day before meals  insulin lispro (ADMELOG) corrective regimen sliding scale   SubCutaneous at bedtime  levothyroxine 25 MICROGram(s) Oral daily  metoprolol tartrate 25 milliGRAM(s) Oral two times a day  midodrine 20 milliGRAM(s) Oral <User Schedule>  polyethylene glycol 3350 17 Gram(s) Oral two times a day  QUEtiapine 25 milliGRAM(s) Oral at bedtime  senna Syrup 10 milliLiter(s) Oral at bedtime  sevelamer carbonate Powder 1600 milliGRAM(s) Oral three times a day  trihexyphenidyl 2 milliGRAM(s) Oral three times a day    MEDICATIONS  (PRN):  acetaminophen     Tablet .. 650 milliGRAM(s) Oral every 6 hours PRN Mild Pain (1 - 3)  albuterol/ipratropium for Nebulization 3 milliLiter(s) Nebulizer every 6 hours PRN Shortness of Breath and/or Wheezing  guaiFENesin Oral Liquid (Sugar-Free) 200 milliGRAM(s) Oral every 6 hours PRN Cough  sodium chloride 0.9% lock flush 10 milliLiter(s) IV Push every 1 hour PRN Pre/post blood products, medications, blood draw, and to maintain line patency      Care Discussed with Consultants/Other Providers [x] YES  [ ] NO    HEALTH ISSUES - PROBLEM Dx:  Acute heart failure    Atrial flutter    DM2 (diabetes mellitus, type 2)    CAD (coronary artery disease)    Parkinsons disease    Acute on chronic renal failure    NSTEMI (non-ST elevation myocardial infarction)    Hypertension    Acute kidney injury superimposed on CKD    Anemia    Hypothyroidism    Delirium    Advanced care planning/counseling discussion    Palliative care status    Palliative care encounter    Pain    Stage 5 chronic kidney disease not on chronic dialysis

## 2021-11-10 NOTE — PROGRESS NOTE ADULT - ASSESSMENT
70yo M w/ PMHx of CAD (s/p CABG 2019), CKD (unknown stage), DM2, Parkinson's Disease, HTN, depression presents with new onset acute heart failure exacerbation, NSTEMI, started on hep gtt, developed bl RP bleed, acute anemia. sp MICU course for hemorrhagic shock, 10/28 sp IR embolization l4 and l5 lumbar artery    # Acute systolic and diastolic heart failure  # NSTEMI- conservative mgmt dt renal function and anemia-bld loss  # ABBY on CKD- newly started HD  # Atrial flutter  # acute hypoxic resp failure  # hemorrhagic shock, left RP hematoma, acute blood loss anemia- sp micu course -stable hb  Echo TTE mod to severe LV SD, hypokinesis anterior wall, not candidate for cath at this point  HD per renal  10/28 sp IR embolization l4 and l5 lumbar artery  on midodrine during dialysis  leucocytosis overall improved, cont to monitor, afebrile  plan for permacath placement and vascular fu for av fistula  pt mod-high risk for vascular procedure, but medically optimized    # DM2 (diabetes mellitus, type 2)  per endo  hba1c 6.4    # CAD (coronary artery disease)  c/w atorvastatin  asa on hold    # Parkinsons disease   # delirium  carbidopa/levodopa   c/w trihexyphenidyl  seroquel    GOC and adv directives noted - palliative fu noted  plan to dc to [facility] upon dc    PCP Dr. Simons    please call [email or web address] @ [phone] for questions or concerns 72yo M w/ PMHx of CAD (s/p CABG 2019), CKD (unknown stage), DM2, Parkinson's Disease, HTN, depression presents with new onset acute heart failure exacerbation, NSTEMI, started on hep gtt, developed bl RP bleed, acute anemia. sp MICU course for hemorrhagic shock, 10/28 sp IR embolization l4 and l5 lumbar artery. for permacath and AVF.    # Acute systolic and diastolic heart failure, improved  # NSTEMI- conservative mgmt dt renal function and anemia-bld loss  # ESRD, new HD  # Atrial flutter  # acute hypoxic resp failure, improved  # hemorrhagic shock, left RP hematoma, acute blood loss anemia- sp micu course -stable hb  Echo TTE mod to severe LV SD, hypokinesis anterior wall, not candidate for cath at this point  HD per renal  10/28 sp IR embolization l4 and l5 lumbar artery  on midodrine during dialysis  leucocytosis overall improved, cont to monitor, afebrile  plan for permacath placement and vascular fu for av fistula  pt mod-high risk for vascular procedure, but medically optimized    # DM2 (diabetes mellitus, type 2)  per endo  hba1c 6.4    # CAD (coronary artery disease)  c/w atorvastatin  asa on hold    # Parkinsons disease   # delirium  carbidopa/levodopa   c/w trihexyphenidyl  seroquel    GOC and adv directives noted - palliative fu noted  plan to dc to [facility] upon dc    PCP Dr. Simons    please call [email or web address] @ [phone] for questions or concerns

## 2021-11-11 NOTE — PROGRESS NOTE ADULT - SUBJECTIVE AND OBJECTIVE BOX
Patient is a 71y old  Male who presents with a chief complaint of leg swelling (11 Nov 2021 11:14)      SUBJECTIVE / OVERNIGHT EVENTS:    Patient seen and examined. remains confused. no complaints. thinks he is in the ED going for surgery.      Vital Signs Last 24 Hrs  T(C): 36.7 (11 Nov 2021 11:04), Max: 36.9 (11 Nov 2021 05:03)  T(F): 98 (11 Nov 2021 11:04), Max: 98.4 (11 Nov 2021 05:03)  HR: 79 (11 Nov 2021 11:04) (76 - 112)  BP: 141/69 (11 Nov 2021 11:04) (135/82 - 158/74)  BP(mean): --  RR: 18 (11 Nov 2021 11:04) (18 - 18)  SpO2: 98% (11 Nov 2021 11:04) (97% - 98%)  I&O's Summary    10 Nov 2021 07:01  -  11 Nov 2021 07:00  --------------------------------------------------------  IN: 300 mL / OUT: 0 mL / NET: 300 mL        PE:  GENERAL: NAD, AAOx2  HEAD:  Atraumatic, Normocephalic, right neck shiley  CHEST/LUNG: Coarse bs  HEART: Regular rate and rhythm; no murmur  ABDOMEN: Soft, Nontender, Nondistended; Bowel sounds present  EXTREMITIES:  2+ Peripheral Pulses, +1 le edema, bl mittens  NEURO: No focal deficits    LABS:                        7.7    11.30 )-----------( 356      ( 11 Nov 2021 05:49 )             25.7     11-11    137  |  98  |  74<H>  ----------------------------<  101<H>  4.4   |  24  |  6.84<H>    Ca    9.1      11 Nov 2021 05:49  Phos  6.2     11-11  Mg     2.6     11-11        CAPILLARY BLOOD GLUCOSE      POCT Blood Glucose.: 135 mg/dL (11 Nov 2021 11:50)  POCT Blood Glucose.: 115 mg/dL (11 Nov 2021 08:18)  POCT Blood Glucose.: 120 mg/dL (10 Nov 2021 22:17)  POCT Blood Glucose.: 139 mg/dL (10 Nov 2021 17:03)            RADIOLOGY & ADDITIONAL TESTS:    Imaging Personally Reviewed:  [x] YES  [ ] NO    Consultant(s) Notes Reviewed:  [x] YES  [ ] NO    MEDICATIONS  (STANDING):  acetaminophen     Tablet .. 650 milliGRAM(s) Oral every 6 hours  ascorbic acid 500 milliGRAM(s) Oral daily  atorvastatin 80 milliGRAM(s) Oral at bedtime  carbidopa/levodopa  25/100 2.5 Tablet(s) Oral <User Schedule>  carbidopa/levodopa  25/100 2 Tablet(s) Oral <User Schedule>  chlorhexidine 4% Liquid 1 Application(s) Topical <User Schedule>  cholecalciferol 1000 Unit(s) Oral daily  dextrose 40% Gel 15 Gram(s) Oral once  dextrose 5%. 1000 milliLiter(s) (50 mL/Hr) IV Continuous <Continuous>  dextrose 5%. 1000 milliLiter(s) (100 mL/Hr) IV Continuous <Continuous>  dextrose 50% Injectable 25 Gram(s) IV Push once  dextrose 50% Injectable 12.5 Gram(s) IV Push once  dextrose 50% Injectable 25 Gram(s) IV Push once  epoetin mari-epbx (RETACRIT) Injectable 08508 Unit(s) IV Push once  folic acid 1 milliGRAM(s) Oral daily  glucagon  Injectable 1 milliGRAM(s) IntraMuscular once  insulin glargine Injectable (LANTUS) 6 Unit(s) SubCutaneous at bedtime  insulin lispro (ADMELOG) corrective regimen sliding scale   SubCutaneous three times a day before meals  insulin lispro (ADMELOG) corrective regimen sliding scale   SubCutaneous at bedtime  levothyroxine 25 MICROGram(s) Oral daily  metoprolol tartrate 25 milliGRAM(s) Oral two times a day  midodrine 20 milliGRAM(s) Oral <User Schedule>  polyethylene glycol 3350 17 Gram(s) Oral two times a day  QUEtiapine 25 milliGRAM(s) Oral at bedtime  senna Syrup 10 milliLiter(s) Oral at bedtime  sevelamer carbonate Powder 1600 milliGRAM(s) Oral three times a day  trihexyphenidyl 2 milliGRAM(s) Oral three times a day    MEDICATIONS  (PRN):  acetaminophen     Tablet .. 650 milliGRAM(s) Oral every 6 hours PRN Mild Pain (1 - 3)  albuterol/ipratropium for Nebulization 3 milliLiter(s) Nebulizer every 6 hours PRN Shortness of Breath and/or Wheezing  guaiFENesin Oral Liquid (Sugar-Free) 200 milliGRAM(s) Oral every 6 hours PRN Cough  sodium chloride 0.9% lock flush 10 milliLiter(s) IV Push every 1 hour PRN Pre/post blood products, medications, blood draw, and to maintain line patency      Care Discussed with Consultants/Other Providers [x] YES  [ ] NO    HEALTH ISSUES - PROBLEM Dx:  Acute heart failure    Atrial flutter    DM2 (diabetes mellitus, type 2)    CAD (coronary artery disease)    Parkinsons disease    Acute on chronic renal failure    NSTEMI (non-ST elevation myocardial infarction)    Hypertension    Acute kidney injury superimposed on CKD    Anemia    Hypothyroidism    Delirium    Advanced care planning/counseling discussion    Palliative care status    Palliative care encounter    Pain    Stage 5 chronic kidney disease not on chronic dialysis

## 2021-11-11 NOTE — PROGRESS NOTE ADULT - ASSESSMENT
72yo M w/ PMHx of CAD (s/p CABG 2019), CKD (unknown stage), DM2, Parkinson's Disease, HTN, depression presents with bilateral leg swelling, patient is a poor historian. ABBY on CKD, anticipate need for long term HD. Vascular consulted for AVF    - Patient is RHD, protect Left upper extremity (pink band, no IV sticks or blood draws)  - clearance please for lue avf poss graft  will follow

## 2021-11-11 NOTE — CHART NOTE - NSCHARTNOTEFT_GEN_A_CORE
VASCULAR SURGERY PRE-OP NOTE    Preop Diagnosis: CKD  Planned Procedure: left AVF    Pertinent Labs:                          7.7    11.30 )-----------( 356      ( 11 Nov 2021 05:49 )             25.7       11-11    137  |  98  |  74<H>  ----------------------------<  101<H>  4.4   |  24  |  6.84<H>    Ca    9.1      11 Nov 2021 05:49  Phos  6.2     11-11  Mg     2.6     11-11  -----------------------------------------------------------  Type + Screen (11.03.21 @ 02:21)    ABO Interpretation: A    Rh Interpretation: Positive    Antibody Screen: Negative    -----------------------------------------------------------  COVID-19 PCR (11.08.21 @ 06:33)    COVID-19 PCR: NotDetec:   -----------------------------------------------------------  12 Lead ECG (11.02.21 @ 03:27)   Ventricular Rate 112 BPM  Atrial Rate 234 BPM  QRS Duration 84 ms  Q-T Interval 356 ms  QTC Calculation(Bazett) 485 ms  P Axis 83 degrees  R Axis 84 degrees  T Axis -14 degrees    Diagnosis Line ATRIAL FLUTTER WITH VARIABLE A-V BLOCK  ST ELEVATION CONSIDER INFERIOR INJURY OR ACUTE INFARCT  ** ** ACUTE MI / STEMI ** **  Consider right ventricular involvement in acute inferior infarct  ABNORMAL ECG  WHEN COMPARED WITH ECG OF 30-OCT-2021 06:08,  SIGNIFICANT CHANGES HAVE OCCURRED  Confirmed by SANDRA CRUZ PERWAIZ ([id number]) on 11/5/2021 3:00:01 PM  -----------------------------------------------------------    EXAM:  XR CHEST PORTABLE URGENT 1V                        PROCEDURE DATE:  11/02/2021    INTERPRETATION:  A single chest x-ray was obtained on November 2, 2021.  IMPRESSION:  The heart is enlarged. Left pleural effusion. Left lower lobe pneumonia and/or atelectasis. Right lung is clear. NG tube is in the stomach and in good position. A central line seen on the right and the tip is in the superior vena cava. Status post sternotomy. No pneumothorax.    --- End of Report ---  KELLI MONTENEGRO MD; Attending Radiologist  This document has been electronically signed. Nov 2 2021  7:07AM  -----------------------------------------------------------  Urine Microscopic-Add On (NC) (10.21.21 @ 14:51)    Red Blood Cell - Urine: 2 /hpf    White Blood Cell - Urine: 1 /HPF    Hyaline Casts: 0 /lpf    Bacteria: Negative    Epithelial Cells: 0 /hpf  -----------------------------------------------------------    Plan:  72yo M w/ PMHx of CAD (s/p CABG 2019), CKD (unknown stage), DM2, Parkinson's Disease, HTN, depression presents with bilateral leg swelling, patient is a poor historian. ABBY on CKD, anticipate need for long term HD. Vascular consulted for AVF, now planned for tomorrow 11/12.    - Patient is RHD, protect Left upper extremity (pink band, no IV sticks or blood draws)  - Obtain day before procedure: COVID PCR  - Obtain AM labs: Type & S, CBC, BMP, Mag, Phos  - NPO/IVF after midnight  - Consent obtained    [phone]  Vascular Surgery

## 2021-11-11 NOTE — PROGRESS NOTE ADULT - ASSESSMENT
70yo M w/ PMHx of CAD (s/p CABG 2019), CKD (unknown stage), DM2, Parkinson's Disease, HTN, depression presents with bilateral leg swelling  ESRD   Severe LV dysfunction ;  A flutter   Confusion      1 IR-  perm cath today     2 Renal- HD today     3 CVS- BP stable, on Midodrine (with hold parameters in place, SBP> 160)  Lopressor for heart rate control     4 Anemia - Retacrit at HD     Sayed [facility]   [phone]

## 2021-11-11 NOTE — PROGRESS NOTE ADULT - ASSESSMENT
71 M w volume overload, GI consulted for drop in hgb    1. Volume overload per cardio    2. ABBY on CKD per renal  for AV graft    3. Drop in hgb, no overt GI Bleed.      4. RP bleed  s/p Abdominal Angiography and Embolization on 10/29/2021 by Interventional radiology of l4and l5 lumbar artery  consider repeat CTA as h/h not improved    5. abdominal distention  -improved    6. Elevated bilirubin likely due to hematoma

## 2021-11-11 NOTE — PROGRESS NOTE ADULT - ASSESSMENT
72yo M w/ PMHx of CAD (s/p CABG 2019), CKD (unknown stage), DM2, Parkinson's Disease, HTN, depression presents with bilateral leg swelling, patient is a poor historian. ABBY on CKD, anticipate need for long term HD. Vascular consulted for AVF    - Patient is RHD, protect Left upper extremity (pink band, no IV sticks or blood draws)  - lue avf poss graft fri   will follow

## 2021-11-11 NOTE — PROGRESS NOTE ADULT - SUBJECTIVE AND OBJECTIVE BOX
Follow-up Pulm Progress Note  Brayan Verma MD  [phone]    No new respiratory events overnight.    Breathing comfortably supine on room air      Vital Signs Last 24 Hrs  T(C): 36.7 (11 Nov 2021 11:04), Max: 36.9 (11 Nov 2021 05:03)  T(F): 98 (11 Nov 2021 11:04), Max: 98.4 (11 Nov 2021 05:03)  HR: 79 (11 Nov 2021 11:04) (76 - 112)  BP: 141/69 (11 Nov 2021 11:04) (135/82 - 158/74)  BP(mean): --  RR: 18 (11 Nov 2021 11:04) (18 - 18)  SpO2: 98% (11 Nov 2021 11:04) (97% - 98%)                        7.7    11.30 )-----------( 356      ( 11 Nov 2021 05:49 )             25.7     11-11    137  |  98  |  74<H>  ----------------------------<  101<H>  4.4   |  24  |  6.84<H>    Ca    9.1      11 Nov 2021 05:49  Phos  6.2     11-11  Mg     2.6     11-11      Physical Examination:  PULM: Diminished basilar BS  CVS: Regular rate and rhythm, no murmurs, rubs, or gallops  ABD: Soft, non-tender  EXT:  No clubbing, cyanosis, or edema    RADIOLOGY REVIEWED  CXR:    CT chest:    TTE:

## 2021-11-11 NOTE — PROGRESS NOTE ADULT - SUBJECTIVE AND OBJECTIVE BOX
Patient is a 71y old  Male who presents with a chief complaint of leg swelling (11 Nov 2021 14:21)      Vascular Surgery Attending Progress Note  [facility] EMR was not accessible  yesterday, therefore I am writing this note today  for yesterday's events       Interval HPI:   pt w/o new c/o     Medications:  acetaminophen     Tablet .. 650 milliGRAM(s) Oral every 6 hours PRN  acetaminophen     Tablet .. 650 milliGRAM(s) Oral every 6 hours  albuterol/ipratropium for Nebulization 3 milliLiter(s) Nebulizer every 6 hours PRN  ascorbic acid 500 milliGRAM(s) Oral daily  atorvastatin 80 milliGRAM(s) Oral at bedtime  carbidopa/levodopa  25/100 2.5 Tablet(s) Oral <User Schedule>  carbidopa/levodopa  25/100 2 Tablet(s) Oral <User Schedule>  chlorhexidine 4% Liquid 1 Application(s) Topical <User Schedule>  cholecalciferol 1000 Unit(s) Oral daily  dextrose 40% Gel 15 Gram(s) Oral once  dextrose 5%. 1000 milliLiter(s) IV Continuous <Continuous>  dextrose 5%. 1000 milliLiter(s) IV Continuous <Continuous>  dextrose 50% Injectable 25 Gram(s) IV Push once  dextrose 50% Injectable 12.5 Gram(s) IV Push once  dextrose 50% Injectable 25 Gram(s) IV Push once  epoetin mari-epbx (RETACRIT) Injectable 18402 Unit(s) IV Push once  folic acid 1 milliGRAM(s) Oral daily  glucagon  Injectable 1 milliGRAM(s) IntraMuscular once  guaiFENesin Oral Liquid (Sugar-Free) 200 milliGRAM(s) Oral every 6 hours PRN  insulin glargine Injectable (LANTUS) 6 Unit(s) SubCutaneous at bedtime  insulin lispro (ADMELOG) corrective regimen sliding scale   SubCutaneous three times a day before meals  insulin lispro (ADMELOG) corrective regimen sliding scale   SubCutaneous at bedtime  levothyroxine 25 MICROGram(s) Oral daily  metoprolol tartrate 25 milliGRAM(s) Oral two times a day  midodrine 20 milliGRAM(s) Oral <User Schedule>  polyethylene glycol 3350 17 Gram(s) Oral two times a day  QUEtiapine 25 milliGRAM(s) Oral at bedtime  senna Syrup 10 milliLiter(s) Oral at bedtime  sevelamer carbonate Powder 1600 milliGRAM(s) Oral three times a day  sodium chloride 0.9% lock flush 10 milliLiter(s) IV Push every 1 hour PRN  trihexyphenidyl 2 milliGRAM(s) Oral three times a day      Vital Signs Last 24 Hrs  T(C): 36.8 (11 Nov 2021 17:30), Max: 36.9 (11 Nov 2021 05:03)  T(F): 98.2 (11 Nov 2021 17:30), Max: 98.4 (11 Nov 2021 05:03)  HR: 77 (11 Nov 2021 18:15) (72 - 112)  BP: 138/69 (11 Nov 2021 18:15) (125/51 - 158/74)  BP(mean): 88 (11 Nov 2021 18:15) (83 - 88)  RR: 17 (11 Nov 2021 18:15) (12 - 19)  SpO2: 100% (11 Nov 2021 18:15) (97% - 100%)  I&O's Summary    10 Nov 2021 07:01  -  11 Nov 2021 07:00  --------------------------------------------------------  IN: 300 mL / OUT: 0 mL / NET: 300 mL    11 Nov 2021 07:01  -  11 Nov 2021 18:22  --------------------------------------------------------  IN: 0 mL / OUT: 0 mL / NET: 0 mL        Physical Exam:  Neuro  A&Ox1 VSS  Vascular:      LABS:                        7.7    11.30 )-----------( 356      ( 11 Nov 2021 05:49 )             25.7     11-11    137  |  98  |  74<H>  ----------------------------<  101<H>  4.4   |  24  |  6.84<H>    Ca    9.1      11 Nov 2021 05:49  Phos  6.2     11-11  Mg     2.6     11-11      Joshua UE vein mapping reviewed     EMI TRIPP MD  [phone] Cell [phone]

## 2021-11-11 NOTE — PROGRESS NOTE ADULT - SUBJECTIVE AND OBJECTIVE BOX
Chief complaint  Patient is a 71y old  Male who presents with a chief complaint of leg swelling (11 Nov 2021 13:53)   Review of systems  Patient in bed, looks comfortable, no hypoglycemic episodes.    Labs and Fingersticks  CAPILLARY BLOOD GLUCOSE      POCT Blood Glucose.: 135 mg/dL (11 Nov 2021 11:50)  POCT Blood Glucose.: 115 mg/dL (11 Nov 2021 08:18)  POCT Blood Glucose.: 120 mg/dL (10 Nov 2021 22:17)  POCT Blood Glucose.: 139 mg/dL (10 Nov 2021 17:03)    Medications  MEDICATIONS  (STANDING):  acetaminophen     Tablet .. 650 milliGRAM(s) Oral every 6 hours  ascorbic acid 500 milliGRAM(s) Oral daily  atorvastatin 80 milliGRAM(s) Oral at bedtime  carbidopa/levodopa  25/100 2.5 Tablet(s) Oral <User Schedule>  carbidopa/levodopa  25/100 2 Tablet(s) Oral <User Schedule>  chlorhexidine 4% Liquid 1 Application(s) Topical <User Schedule>  cholecalciferol 1000 Unit(s) Oral daily  dextrose 40% Gel 15 Gram(s) Oral once  dextrose 5%. 1000 milliLiter(s) (50 mL/Hr) IV Continuous <Continuous>  dextrose 5%. 1000 milliLiter(s) (100 mL/Hr) IV Continuous <Continuous>  dextrose 50% Injectable 25 Gram(s) IV Push once  dextrose 50% Injectable 12.5 Gram(s) IV Push once  dextrose 50% Injectable 25 Gram(s) IV Push once  epoetin mari-epbx (RETACRIT) Injectable 77270 Unit(s) IV Push once  folic acid 1 milliGRAM(s) Oral daily  glucagon  Injectable 1 milliGRAM(s) IntraMuscular once  insulin glargine Injectable (LANTUS) 6 Unit(s) SubCutaneous at bedtime  insulin lispro (ADMELOG) corrective regimen sliding scale   SubCutaneous three times a day before meals  insulin lispro (ADMELOG) corrective regimen sliding scale   SubCutaneous at bedtime  levothyroxine 25 MICROGram(s) Oral daily  metoprolol tartrate 25 milliGRAM(s) Oral two times a day  midodrine 20 milliGRAM(s) Oral <User Schedule>  polyethylene glycol 3350 17 Gram(s) Oral two times a day  QUEtiapine 25 milliGRAM(s) Oral at bedtime  senna Syrup 10 milliLiter(s) Oral at bedtime  sevelamer carbonate Powder 1600 milliGRAM(s) Oral three times a day  trihexyphenidyl 2 milliGRAM(s) Oral three times a day      Physical Exam  General: Patient comfortable in bed  Vital Signs Last 12 Hrs  T(F): 98 (11-11-21 @ 11:04), Max: 98.4 (11-11-21 @ 05:03)  HR: 79 (11-11-21 @ 11:04) (79 - 112)  BP: 141/69 (11-11-21 @ 11:04) (135/82 - 141/69)  BP(mean): --  RR: 18 (11-11-21 @ 11:04) (18 - 18)  SpO2: 98% (11-11-21 @ 11:04) (97% - 98%)    Diagnostics    Free Thyroxine, Serum: AM Sched. Collection: 06-Nov-2021 06:00 (11-05 @ 13:18)  Free Thyroxine, Serum: AM Sched. Collection: 26-Oct-2021 06:00 (10-25 @ 11:45)  Thyroid Stimulating Hormone, Serum: AM Sched. Collection: 26-Oct-2021 06:00 (10-25 @ 11:45)  Cortisol AM, Serum: 08:00 (10-22 @ 12:47)  Free Thyroxine, Serum: AM Sched. Collection: 23-Oct-2021 06:00 (10-22 @ 12:43)  Thyroid Stimulating Hormone, Serum: AM Sched. Collection: 23-Oct-2021 06:00 (10-22 @ 12:43)             Chief complaint  Patient is a 71y old  Male who presents with a chief  complaint of leg swelling (11 Nov 2021 13:53)   Review of systems  Patient in bed, looks comfortable, no hypoglycemic episodes.    Labs and Fingersticks  CAPILLARY BLOOD GLUCOSE      POCT Blood Glucose.: 135 mg/dL (11 Nov 2021 11:50)  POCT Blood Glucose.: 115 mg/dL (11 Nov 2021 08:18)  POCT Blood Glucose.: 120 mg/dL (10 Nov 2021 22:17)  POCT Blood Glucose.: 139 mg/dL (10 Nov 2021 17:03)    Medications  MEDICATIONS  (STANDING):  acetaminophen     Tablet .. 650 milliGRAM(s) Oral every 6 hours  ascorbic acid 500 milliGRAM(s) Oral daily  atorvastatin 80 milliGRAM(s) Oral at bedtime  carbidopa/levodopa  25/100 2.5 Tablet(s) Oral <User Schedule>  carbidopa/levodopa  25/100 2 Tablet(s) Oral <User Schedule>  chlorhexidine 4% Liquid 1 Application(s) Topical <User Schedule>  cholecalciferol 1000 Unit(s) Oral daily  dextrose 40% Gel 15 Gram(s) Oral once  dextrose 5%. 1000 milliLiter(s) (50 mL/Hr) IV Continuous <Continuous>  dextrose 5%. 1000 milliLiter(s) (100 mL/Hr) IV Continuous <Continuous>  dextrose 50% Injectable 25 Gram(s) IV Push once  dextrose 50% Injectable 12.5 Gram(s) IV Push once  dextrose 50% Injectable 25 Gram(s) IV Push once  epoetin mari-epbx (RETACRIT) Injectable 39983 Unit(s) IV Push once  folic acid 1 milliGRAM(s) Oral daily  glucagon  Injectable 1 milliGRAM(s) IntraMuscular once  insulin glargine Injectable (LANTUS) 6 Unit(s) SubCutaneous at bedtime  insulin lispro (ADMELOG) corrective regimen sliding scale   SubCutaneous three times a day before meals  insulin lispro (ADMELOG) corrective regimen sliding scale   SubCutaneous at bedtime  levothyroxine 25 MICROGram(s) Oral daily  metoprolol tartrate 25 milliGRAM(s) Oral two times a day  midodrine 20 milliGRAM(s) Oral <User Schedule>  polyethylene glycol 3350 17 Gram(s) Oral two times a day  QUEtiapine 25 milliGRAM(s) Oral at bedtime  senna Syrup 10 milliLiter(s) Oral at bedtime  sevelamer carbonate Powder 1600 milliGRAM(s) Oral three times a day  trihexyphenidyl 2 milliGRAM(s) Oral three times a day      Physical Exam  General: Patient comfortable in bed  Vital Signs Last 12 Hrs  T(F): 98 (11-11-21 @ 11:04), Max: 98.4 (11-11-21 @ 05:03)  HR: 79 (11-11-21 @ 11:04) (79 - 112)  BP: 141/69 (11-11-21 @ 11:04) (135/82 - 141/69)  BP(mean): --  RR: 18 (11-11-21 @ 11:04) (18 - 18)  SpO2: 98% (11-11-21 @ 11:04) (97% - 98%)    Diagnostics    Free Thyroxine, Serum: AM Sched. Collection: 06-Nov-2021 06:00 (11-05 @ 13:18)  Free Thyroxine, Serum: AM Sched. Collection: 26-Oct-2021 06:00 (10-25 @ 11:45)  Thyroid Stimulating Hormone, Serum: AM Sched. Collection: 26-Oct-2021 06:00 (10-25 @ 11:45)  Cortisol AM, Serum: 08:00 (10-22 @ 12:47)  Free Thyroxine, Serum: AM Sched. Collection: 23-Oct-2021 06:00 (10-22 @ 12:43)  Thyroid Stimulating Hormone, Serum: AM Sched. Collection: 23-Oct-2021 06:00 (10-22 @ 12:43)

## 2021-11-11 NOTE — PROGRESS NOTE ADULT - PROBLEM SELECTOR PLAN 1
I Tramaine Ferraro MD have personally  seen and examined the patient today and have noted the findings and formulated the plan of care.  I Tramaine Ferraro MD have personally seen and examined the patient at bedside today at  7 pm

## 2021-11-11 NOTE — PROGRESS NOTE ADULT - SUBJECTIVE AND OBJECTIVE BOX
Patient is a 71y old  Male who presents with a chief complaint of leg swelling (11 Nov 2021 18:22)      Vascular Surgery Attending Progress Note    Interval HPI: pt w/o new c/o     Medications:  acetaminophen     Tablet .. 650 milliGRAM(s) Oral every 6 hours PRN  acetaminophen     Tablet .. 650 milliGRAM(s) Oral every 6 hours  albuterol/ipratropium for Nebulization 3 milliLiter(s) Nebulizer every 6 hours PRN  ascorbic acid 500 milliGRAM(s) Oral daily  atorvastatin 80 milliGRAM(s) Oral at bedtime  carbidopa/levodopa  25/100 2.5 Tablet(s) Oral <User Schedule>  carbidopa/levodopa  25/100 2 Tablet(s) Oral <User Schedule>  chlorhexidine 4% Liquid 1 Application(s) Topical <User Schedule>  cholecalciferol 1000 Unit(s) Oral daily  dextrose 40% Gel 15 Gram(s) Oral once  dextrose 5%. 1000 milliLiter(s) IV Continuous <Continuous>  dextrose 5%. 1000 milliLiter(s) IV Continuous <Continuous>  dextrose 50% Injectable 25 Gram(s) IV Push once  dextrose 50% Injectable 12.5 Gram(s) IV Push once  dextrose 50% Injectable 25 Gram(s) IV Push once  epoetin mari-epbx (RETACRIT) Injectable 83931 Unit(s) IV Push once  folic acid 1 milliGRAM(s) Oral daily  glucagon  Injectable 1 milliGRAM(s) IntraMuscular once  guaiFENesin Oral Liquid (Sugar-Free) 200 milliGRAM(s) Oral every 6 hours PRN  insulin glargine Injectable (LANTUS) 6 Unit(s) SubCutaneous at bedtime  insulin lispro (ADMELOG) corrective regimen sliding scale   SubCutaneous three times a day before meals  insulin lispro (ADMELOG) corrective regimen sliding scale   SubCutaneous at bedtime  levothyroxine 25 MICROGram(s) Oral daily  metoprolol tartrate 25 milliGRAM(s) Oral two times a day  midodrine 20 milliGRAM(s) Oral <User Schedule>  polyethylene glycol 3350 17 Gram(s) Oral two times a day  QUEtiapine 25 milliGRAM(s) Oral at bedtime  senna Syrup 10 milliLiter(s) Oral at bedtime  sevelamer carbonate Powder 1600 milliGRAM(s) Oral three times a day  sodium chloride 0.9% lock flush 10 milliLiter(s) IV Push every 1 hour PRN  trihexyphenidyl 2 milliGRAM(s) Oral three times a day      Vital Signs Last 24 Hrs  T(C): 36.8 (11 Nov 2021 17:30), Max: 36.9 (11 Nov 2021 05:03)  T(F): 98.2 (11 Nov 2021 17:30), Max: 98.4 (11 Nov 2021 05:03)  HR: 77 (11 Nov 2021 18:15) (72 - 112)  BP: 138/69 (11 Nov 2021 18:15) (125/51 - 158/74)  BP(mean): 88 (11 Nov 2021 18:15) (83 - 88)  RR: 17 (11 Nov 2021 18:15) (12 - 19)  SpO2: 100% (11 Nov 2021 18:15) (97% - 100%)  I&O's Summary    10 Nov 2021 07:01  -  11 Nov 2021 07:00  --------------------------------------------------------  IN: 300 mL / OUT: 0 mL / NET: 300 mL    11 Nov 2021 07:01  -  11 Nov 2021 18:24  --------------------------------------------------------  IN: 0 mL / OUT: 0 mL / NET: 0 mL        Physical Exam:  Neuro  A&Ox1 VSS  Vascular:  stable     LABS:                        7.7    11.30 )-----------( 356      ( 11 Nov 2021 05:49 )             25.7     11-11    137  |  98  |  74<H>  ----------------------------<  101<H>  4.4   |  24  |  6.84<H>    Ca    9.1      11 Nov 2021 05:49  Phos  6.2     11-11  Mg     2.6     11-11          EMI TRIPP MD  [phone] Cell [phone]

## 2021-11-11 NOTE — PROGRESS NOTE ADULT - ASSESSMENT
70yo M w/ PMHx of CAD (s/p CABG 2019), CKD (unknown stage), DM2, Parkinson's Disease, HTN, depression presents with new onset acute heart failure exacerbation, NSTEMI, started on hep gtt, developed bl RP bleed, acute anemia. sp MICU course for hemorrhagic shock, 10/28 sp IR embolization l4 and l5 lumbar artery. for permacath and AVF.    # Acute systolic and diastolic heart failure, improved  # NSTEMI- conservative mgmt dt renal function and anemia-bld loss  # ESRD, new HD  # Atrial flutter  # acute hypoxic resp failure, improved  # hemorrhagic shock, left RP hematoma, acute blood loss anemia  Echo TTE mod to severe LV SD, hypokinesis anterior wall, not candidate for cath at this point  HD per renal  10/28 sp IR embolization l4 and l5 lumbar artery  on midodrine during dialysis  leucocytosis overall improved, cont to monitor, afebrile  monitor h/h, transfuse <7  plan for permacath placement today and vascular AVF tomorrow  pt mod-high risk for vascular procedure, but medically optimized    # DM2 (diabetes mellitus, type 2)  per endo  hba1c 6.4    # CAD (coronary artery disease)  c/w atorvastatin  asa on hold    # Parkinsons disease   # delirium  carbidopa/levodopa   c/w trihexyphenidyl  seroquel  will ask neuro to evaluate given confusion    PCP Dr. Simons    please call [facility] @ [phone] for questions or concerns

## 2021-11-11 NOTE — PROGRESS NOTE ADULT - ASSESSMENT
Assessment  DMT2: 71y Male with DM T2 with hyperglycemia, A1C 6.4%, was on insulin at home, now on low-dose basal insulin and coverage, blood sugars are stable and trending within acceptable range, no hypoglycemic episodes, appears comfortable in NAD, remains confused.  Hypothyroidism: Patient has no history thyroid disease, was not on any thyroid supplements, subclinical with low-normal FT4, lethargic, started on synthroid 25 mcg po daily, FT4 improved to 1.2.  CHF: on medications, stable, monitored.  HTN: Controlled,  on antihypertensive medications.  Parkinsons: on meds, monitored.  CKD: Monitor labs/BMP      Kelsie Reece MD  Cell: [phone]  Office: [phone]     Assessment  DMT2: 71y Male with DM T2 with hyperglycemia, A1C 6.4%, was on insulin at home, now on low-dose basal insulin and coverage, blood sugars are stable and trending within acceptable range,  no hypoglycemic episodes, appears comfortable in NAD, remains confused.  Hypothyroidism: Patient has no history thyroid disease, was not on any thyroid supplements, subclinical with low-normal FT4, lethargic, started on synthroid 25 mcg po daily, FT4 improved to 1.2.  CHF: on medications, stable, monitored.  HTN: Controlled,  on antihypertensive medications.  Parkinsons: on meds, monitored.  CKD: Monitor labs/BMP      Kelsie Reece MD  Cell: [phone]  Office: [phone]

## 2021-11-11 NOTE — PROGRESS NOTE ADULT - SUBJECTIVE AND OBJECTIVE BOX
Chief Complaint:  Patient is a 71y old  Male who presents with a chief complaint of leg swelling (11 Nov 2021 18:24)      Date of service 11-11-21 @ 20:29      Interval Events:   no abd pain/bleeding  Hospital Medications:  acetaminophen     Tablet .. 650 milliGRAM(s) Oral every 6 hours PRN  acetaminophen     Tablet .. 650 milliGRAM(s) Oral every 6 hours  albuterol/ipratropium for Nebulization 3 milliLiter(s) Nebulizer every 6 hours PRN  ascorbic acid 500 milliGRAM(s) Oral daily  atorvastatin 80 milliGRAM(s) Oral at bedtime  carbidopa/levodopa  25/100 2.5 Tablet(s) Oral <User Schedule>  carbidopa/levodopa  25/100 2 Tablet(s) Oral <User Schedule>  chlorhexidine 4% Liquid 1 Application(s) Topical <User Schedule>  cholecalciferol 1000 Unit(s) Oral daily  dextrose 40% Gel 15 Gram(s) Oral once  dextrose 5%. 1000 milliLiter(s) IV Continuous <Continuous>  dextrose 5%. 1000 milliLiter(s) IV Continuous <Continuous>  dextrose 50% Injectable 25 Gram(s) IV Push once  dextrose 50% Injectable 12.5 Gram(s) IV Push once  dextrose 50% Injectable 25 Gram(s) IV Push once  epoetin mari-epbx (RETACRIT) Injectable 12450 Unit(s) IV Push once  folic acid 1 milliGRAM(s) Oral daily  glucagon  Injectable 1 milliGRAM(s) IntraMuscular once  guaiFENesin Oral Liquid (Sugar-Free) 200 milliGRAM(s) Oral every 6 hours PRN  insulin glargine Injectable (LANTUS) 6 Unit(s) SubCutaneous at bedtime  insulin lispro (ADMELOG) corrective regimen sliding scale   SubCutaneous three times a day before meals  insulin lispro (ADMELOG) corrective regimen sliding scale   SubCutaneous at bedtime  levothyroxine 25 MICROGram(s) Oral daily  metoprolol tartrate 25 milliGRAM(s) Oral two times a day  midodrine 20 milliGRAM(s) Oral <User Schedule>  polyethylene glycol 3350 17 Gram(s) Oral two times a day  QUEtiapine 25 milliGRAM(s) Oral at bedtime  senna Syrup 10 milliLiter(s) Oral at bedtime  sevelamer carbonate Powder 1600 milliGRAM(s) Oral three times a day  sodium chloride 0.9% lock flush 10 milliLiter(s) IV Push every 1 hour PRN  trihexyphenidyl 2 milliGRAM(s) Oral three times a day        Review of Systems:  confused    PHYSICAL EXAM:   Vital Signs:  Vital Signs Last 24 Hrs  T(C): 36.9 (11 Nov 2021 20:11), Max: 36.9 (11 Nov 2021 05:03)  T(F): 98.4 (11 Nov 2021 20:11), Max: 98.4 (11 Nov 2021 05:03)  HR: 114 (11 Nov 2021 20:11) (72 - 114)  BP: 134/75 (11 Nov 2021 20:11) (125/51 - 149/71)  BP(mean): 94 (11 Nov 2021 18:30) (83 - 94)  RR: 18 (11 Nov 2021 20:11) (12 - 19)  SpO2: 94% (11 Nov 2021 20:11) (94% - 100%)  Daily     Daily       PHYSICAL EXAM:     GENERAL:  Appears stated age, well-groomed, well-nourished, no distress  HEENT:  NC/AT,  conjunctivae anicteric, clear and pink,   NECK: supple, trachea midline  CHEST:  Full & symmetric excursion, no increased effort, breath sounds clear  HEART:  Regular rhythm, no JVD  ABDOMEN:  Soft, non-tender, non-distended, normoactive bowel sounds,  no masses , no hepatosplenomegaly  EXTREMITIES:  no cyanosis,clubbing or edema  SKIN:  No rash, erythema, or, ecchymoses, no jaundice  NEURO:  Alert, non-focal, no asterixis  PSYCH: Appropriate affect, oriented to place and time  RECTAL: Deferred      LABS Personally reviewed by me:                        7.7    11.30 )-----------( 356      ( 11 Nov 2021 05:49 )             25.7     Mean Cell Volume: 104.0 fl (11-11-21 @ 05:49)    11-11    137  |  98  |  74<H>  ----------------------------<  101<H>  4.4   |  24  |  6.84<H>    Ca    9.1      11 Nov 2021 05:49  Phos  6.2     11-11  Mg     2.6     11-11                                    7.7    11.30 )-----------( 356      ( 11 Nov 2021 05:49 )             25.7                         8.1    12.34 )-----------( 342      ( 10 Nov 2021 06:00 )             26.0                         8.2    12.30 )-----------( 329      ( 09 Nov 2021 05:08 )             26.5       Imaging personally reviewed by me:

## 2021-11-11 NOTE — PROGRESS NOTE ADULT - SUBJECTIVE AND OBJECTIVE BOX
NEPHROLOGY-[facility] [phone]        Patient seen and examined in bed.  he was about the same         MEDICATIONS  (STANDING):  acetaminophen     Tablet .. 650 milliGRAM(s) Oral every 6 hours  ascorbic acid 500 milliGRAM(s) Oral daily  atorvastatin 80 milliGRAM(s) Oral at bedtime  carbidopa/levodopa  25/100 2.5 Tablet(s) Oral <User Schedule>  carbidopa/levodopa  25/100 2 Tablet(s) Oral <User Schedule>  chlorhexidine 4% Liquid 1 Application(s) Topical <User Schedule>  cholecalciferol 1000 Unit(s) Oral daily  dextrose 40% Gel 15 Gram(s) Oral once  dextrose 5%. 1000 milliLiter(s) (50 mL/Hr) IV Continuous <Continuous>  dextrose 5%. 1000 milliLiter(s) (100 mL/Hr) IV Continuous <Continuous>  dextrose 50% Injectable 25 Gram(s) IV Push once  dextrose 50% Injectable 12.5 Gram(s) IV Push once  dextrose 50% Injectable 25 Gram(s) IV Push once  epoetin mari-epbx (RETACRIT) Injectable 67468 Unit(s) IV Push once  folic acid 1 milliGRAM(s) Oral daily  glucagon  Injectable 1 milliGRAM(s) IntraMuscular once  insulin glargine Injectable (LANTUS) 6 Unit(s) SubCutaneous at bedtime  insulin lispro (ADMELOG) corrective regimen sliding scale   SubCutaneous three times a day before meals  insulin lispro (ADMELOG) corrective regimen sliding scale   SubCutaneous at bedtime  levothyroxine 25 MICROGram(s) Oral daily  metoprolol tartrate 25 milliGRAM(s) Oral two times a day  midodrine 20 milliGRAM(s) Oral <User Schedule>  polyethylene glycol 3350 17 Gram(s) Oral two times a day  QUEtiapine 25 milliGRAM(s) Oral at bedtime  senna Syrup 10 milliLiter(s) Oral at bedtime  sevelamer carbonate Powder 1600 milliGRAM(s) Oral three times a day  trihexyphenidyl 2 milliGRAM(s) Oral three times a day      VITAL:  T(C): , Max: 36.9 (11-11-21 @ 05:03)  T(F): , Max: 98.4 (11-11-21 @ 05:03)  HR: 112 (11-11-21 @ 05:03)  BP: 135/82 (11-11-21 @ 05:03)  BP(mean): --  RR: 18 (11-11-21 @ 05:03)  SpO2: 97% (11-11-21 @ 05:03)  Wt(kg): --    I and O's:    11-10 @ 07:01  -  11-11 @ 07:00  --------------------------------------------------------  IN: 300 mL / OUT: 0 mL / NET: 300 mL          PHYSICAL EXAM:    Constitutional: NAD  Neck:  No JVD  Respiratory: CTAB/L  Cardiovascular: S1 and S2  Gastrointestinal: BS+, soft, NT/ND  Extremities: No peripheral edema  Neurological: A/O x 3, no focal deficits  Psychiatric: Normal mood, normal affect  : No Javier  Skin: No rashes  Access: Not applicable    LABS:                        7.7    11.30 )-----------( 356      ( 11 Nov 2021 05:49 )             25.7     11-11    137  |  98  |  74<H>  ----------------------------<  101<H>  4.4   |  24  |  6.84<H>    Ca    9.1      11 Nov 2021 05:49  Phos  6.2     11-11  Mg     2.6     11-11            Urine Studies:          RADIOLOGY & ADDITIONAL STUDIES:

## 2021-11-12 NOTE — PROGRESS NOTE ADULT - SUBJECTIVE AND OBJECTIVE BOX
INTERVAL HPI/OVERNIGHT EVENTS:    no abd pain/bleeding    MEDICATIONS  (STANDING):    MEDICATIONS  (PRN):      Allergies    adhesives (Rash)  latex (Urticaria)  No Known Drug Allergies    Intolerances        Review of Systems:    General:  No wt loss, fevers, chills, night sweats,fatigue,   Eyes:  Good vision, no reported pain  ENT:  No sore throat, pain, runny nose, dysphagia  CV:  No pain, palpitations, hypo/hypertension  Resp:  No dyspnea, cough, tachypnea, wheezing  GI:  see HPI  :  No pain, bleeding, incontinence, nocturia  Muscle:  No pain, weakness  Neuro:  No weakness, tingling, memory problems  Psych:  No fatigue, insomnia, mood problems, depression  Endocrine:  No polyuria, polydypsia, cold/heat intolerance  Heme:  No petechiae, ecchymosis, easy bruisability  Skin:  No rash, tattoos, scars, edema      Vital Signs Last 24 Hrs  T(C): 37.1 (12 Nov 2021 11:23), Max: 37.2 (12 Nov 2021 04:03)  T(F): 98.7 (12 Nov 2021 11:23), Max: 98.9 (12 Nov 2021 04:03)  HR: 98 (12 Nov 2021 11:23) (72 - 119)  BP: 132/78 (12 Nov 2021 11:23) (108/72 - 183/104)  BP(mean): 94 (11 Nov 2021 18:30) (83 - 94)  RR: 18 (12 Nov 2021 11:23) (12 - 20)  SpO2: 96% (12 Nov 2021 11:23) (94% - 100%)    PHYSICAL EXAM:    Constitutional: NAD, well-developed  HEENT: EOMI, throat clear  Neck: No LAD, supple  Respiratory: CTA and P  Cardiovascular: S1 and S2, RRR, no M  Gastrointestinal: BS+, soft, NT/ND, neg HSM,  Extremities: No peripheral edema, neg clubing, cyanosis  Vascular: 2+ peripheral pulses  Neurological: A/O x 3, no focal deficits  Psychiatric: Normal mood, normal affect  Skin: No rashes      LABS:                        8.4    13.69 )-----------( 364      ( 12 Nov 2021 04:50 )             27.4     11-12    138  |  98  |  44<H>  ----------------------------<  110<H>  4.1   |  23  |  4.64<H>    Ca    9.1      12 Nov 2021 04:50  Phos  4.4     11-12  Mg     2.2     11-12      PT/INR - ( 12 Nov 2021 04:50 )   PT: 13.3 sec;   INR: 1.11 ratio         PTT - ( 12 Nov 2021 04:50 )  PTT:56.1 sec      RADIOLOGY & ADDITIONAL TESTS:

## 2021-11-12 NOTE — CHART NOTE - NSCHARTNOTEFT_GEN_A_CORE
Surgery Post op Note    Procedure: Creation left upper extremity av brachiocephalic fistula via cephalic transposition.     SUBJECTIVE: Pt seen and examined at bedside several hours after surgery. Patient is confused, A&O*0,  agitate with bilateral mittens.       Physical exam  General Appearance: Appears confused and agitate  Respiratory: No labored breathing  CV: Pulse regularly present  Abdomen: Soft, nontender, nondistended  RUE: very weak thrill, palpable radial, edema around the incision area.     Vital Signs Last 24 Hrs  T(C): 36.3 (12 Nov 2021 15:27), Max: 37.2 (12 Nov 2021 04:03)  T(F): 97.4 (12 Nov 2021 15:27), Max: 98.9 (12 Nov 2021 04:03)  HR: 112 (12 Nov 2021 15:27) (76 - 119)  BP: 122/70 (12 Nov 2021 18:14) (108/72 - 183/104)  BP(mean): 117 (12 Nov 2021 15:27) (93 - 122)  RR: 16 (12 Nov 2021 15:27) (16 - 20)  SpO2: 98% (12 Nov 2021 15:27) (94% - 100%)  I&O's Summary    11 Nov 2021 07:01  -  12 Nov 2021 07:00  --------------------------------------------------------  IN: 800 mL / OUT: 2400 mL / NET: -1600 mL      I&O's Detail    11 Nov 2021 07:01  -  12 Nov 2021 07:00  --------------------------------------------------------  IN:    Other (mL): 800 mL  Total IN: 800 mL    OUT:    Other (mL): 2400 mL    Voided (mL): 0 mL  Total OUT: 2400 mL    Total NET: -1600 mL          MEDICATIONS  (STANDING):  ascorbic acid 500 milliGRAM(s) Oral daily  atorvastatin 80 milliGRAM(s) Oral at bedtime  carbidopa/levodopa  25/100 2.5 Tablet(s) Oral <User Schedule>  carbidopa/levodopa  25/100 2 Tablet(s) Oral <User Schedule>  chlorhexidine 4% Liquid 1 Application(s) Topical <User Schedule>  cholecalciferol 1000 Unit(s) Oral daily  dextrose 5%. 1000 milliLiter(s) (50 mL/Hr) IV Continuous <Continuous>  dextrose 5%. 1000 milliLiter(s) (100 mL/Hr) IV Continuous <Continuous>  dextrose 50% Injectable 25 Gram(s) IV Push once  dextrose 50% Injectable 25 Gram(s) IV Push once  dextrose 50% Injectable 12.5 Gram(s) IV Push once  folic acid 1 milliGRAM(s) Oral daily  glucagon  Injectable 1 milliGRAM(s) IntraMuscular once  insulin glargine Injectable (LANTUS) 6 Unit(s) SubCutaneous at bedtime  insulin lispro (ADMELOG) corrective regimen sliding scale   SubCutaneous three times a day before meals  insulin lispro (ADMELOG) corrective regimen sliding scale   SubCutaneous at bedtime  levothyroxine 25 MICROGram(s) Oral daily  metoprolol tartrate 25 milliGRAM(s) Oral two times a day  midodrine 20 milliGRAM(s) Oral <User Schedule>  polyethylene glycol 3350 17 Gram(s) Oral two times a day  QUEtiapine 25 milliGRAM(s) Oral at bedtime  senna Syrup 10 milliLiter(s) Oral at bedtime  trihexyphenidyl 2 milliGRAM(s) Oral three times a day    MEDICATIONS  (PRN):  acetaminophen     Tablet .. 650 milliGRAM(s) Oral every 6 hours PRN Mild Pain (1 - 3)  albuterol/ipratropium for Nebulization 3 milliLiter(s) Nebulizer every 6 hours PRN Shortness of Breath and/or Wheezing  guaiFENesin Oral Liquid (Sugar-Free) 200 milliGRAM(s) Oral every 6 hours PRN Cough  sodium chloride 0.9% lock flush 10 milliLiter(s) IV Push every 1 hour PRN Pre/post blood products, medications, blood draw, and to maintain line patency      LABS:                        8.4    13.69 )-----------( 364      ( 12 Nov 2021 04:50 )             27.4     11-12    138  |  98  |  44<H>  ----------------------------<  110<H>  4.1   |  23  |  4.64<H>    Ca    9.1      12 Nov 2021 04:50  Phos  4.4     11-12  Mg     2.2     11-12      PT/INR - ( 12 Nov 2021 04:50 )   PT: 13.3 sec;   INR: 1.11 ratio         PTT - ( 12 Nov 2021 04:50 )  PTT:56.1 sec        A/P: 71 M s/p Creation left upper extremity av brachiocephalic fistula via cephalic transposition.   - Thrill very weak, will see the patient again with Senior resident  - f/u vascular examine  - care per primary team    Vascular  [id number]

## 2021-11-12 NOTE — BRIEF OPERATIVE NOTE - NSICDXBRIEFPROCEDURE_GEN_ALL_CORE_FT
PROCEDURES:  Creation, AV fistula, upper extremity, using transposition technique 12-Nov-2021 13:59:38  Rio Khan

## 2021-11-12 NOTE — PROGRESS NOTE ADULT - ASSESSMENT
72yo M w/ PMHx of CAD (s/p CABG 2019), CKD (unknown stage), DM2, Parkinson's Disease, HTN, depression presents with bilateral leg swelling, patient is a poor historian. ABBY on CKD, anticipate need for long term HD. Vascular consulted for AVF    - Patient is RHD, protect Left upper extremity (pink band, no IV sticks or blood draws)  - Has med/card clearance  - Plan discussed with son and daughter in law, who are aware and in agreement   - OR today for avf, possible graft    Vascular [phone]

## 2021-11-12 NOTE — PROGRESS NOTE ADULT - ASSESSMENT
71 M w volume overload, GI consulted for drop in hgb.     1. Volume overload per cardio    2. ABBY on CKD per renal  for AV graft    3. Drop in hgb, no overt GI Bleed.      4. RP bleed  s/p Abdominal Angiography and Embolization on 10/29/2021 by Interventional radiology of l4and l5 lumbar artery  consider repeat CTA as h/h not improved    5. abdominal distention  -improved    6. Elevated bilirubin likely due to hematoma    The plan of care was discussed with the physician assistant and modifications were made to the notation where appropriate.   Differential diagnosis and plan of care discussed with patient after the evaluation  35 minutes spent on total encounter of which more than fifty percent of the encounter was spent counseling and/or coordinating care by the attending physician.    [facility]  Gastroenterology and Hepatology  [address] [place], [place]  Office: [phone]  Cell: [phone]

## 2021-11-12 NOTE — PROGRESS NOTE ADULT - ASSESSMENT
ASSESSMENT/PLAN:  72yo M w/ PMHx of CAD (s/p CABG 2019), CKD (unknown stage), DM2, Parkinson's Disease, HTN, depression presents with bilateral leg swelling  ESRD   Severe LV dysfunction ;  A flutter   Confusion      1 IR- s/p perm cath placement yesterday      2 Renal- dialyzed yesterday, HD tomorrow     3 CVS- BP stable, on Midodrine (with hold parameters in place, SBP> 160)  Lopressor for heart rate control     4 Anemia - Retacrit at HD     5 Vasc - LUE precautions, OR today for access placement     Natividad Friedman NP-C  [facility]  [phone]      ASSESSMENT/PLAN:  70yo M w/ PMHx of CAD (s/p CABG 2019), CKD (unknown stage), DM2, Parkinson's Disease, HTN, depression presents with bilateral leg swelling  ESRD   Severe LV dysfunction ;  A flutter   Confusion      1 IR- s/p perm cath placement yesterday      2 Renal- dialyzed yesterday, HD tomorrow     3 CVS- BP stable, on Midodrine (with hold parameters in place, SBP> 160),  Lopressor for heart rate control     4 Anemia - Retacrit at HD     5 Vasc - LUE precautions, OR today for access placement     Natividad Friedman NP-C  [facility]  [phone]

## 2021-11-12 NOTE — PROGRESS NOTE ADULT - SUBJECTIVE AND OBJECTIVE BOX
NEPHROLOGY    Patient seen and examined.    MEDICATIONS  (STANDING):  ascorbic acid 500 milliGRAM(s) Oral daily  atorvastatin 80 milliGRAM(s) Oral at bedtime  carbidopa/levodopa  25/100 2.5 Tablet(s) Oral <User Schedule>  carbidopa/levodopa  25/100 2 Tablet(s) Oral <User Schedule>  chlorhexidine 4% Liquid 1 Application(s) Topical <User Schedule>  cholecalciferol 1000 Unit(s) Oral daily  dextrose 40% Gel 15 Gram(s) Oral once  dextrose 5%. 1000 milliLiter(s) (50 mL/Hr) IV Continuous <Continuous>  dextrose 5%. 1000 milliLiter(s) (100 mL/Hr) IV Continuous <Continuous>  dextrose 50% Injectable 25 Gram(s) IV Push once  dextrose 50% Injectable 12.5 Gram(s) IV Push once  dextrose 50% Injectable 25 Gram(s) IV Push once  folic acid 1 milliGRAM(s) Oral daily  glucagon  Injectable 1 milliGRAM(s) IntraMuscular once  insulin glargine Injectable (LANTUS) 6 Unit(s) SubCutaneous at bedtime  insulin lispro (ADMELOG) corrective regimen sliding scale   SubCutaneous three times a day before meals  insulin lispro (ADMELOG) corrective regimen sliding scale   SubCutaneous at bedtime  levothyroxine 25 MICROGram(s) Oral daily  metoprolol tartrate 25 milliGRAM(s) Oral two times a day  midodrine 20 milliGRAM(s) Oral <User Schedule>  polyethylene glycol 3350 17 Gram(s) Oral two times a day  QUEtiapine 25 milliGRAM(s) Oral at bedtime  senna Syrup 10 milliLiter(s) Oral at bedtime  sevelamer carbonate Powder 1600 milliGRAM(s) Oral three times a day  trihexyphenidyl 2 milliGRAM(s) Oral three times a day    VITALS:  T(C): , Max: 37.2 (11-12-21 @ 04:03)  T(F): , Max: 98.9 (11-12-21 @ 04:03)  HR: 119 (11-12-21 @ 04:03)  BP: 176/78 (11-12-21 @ 04:03)  BP(mean): 94 (11-11-21 @ 18:30)  RR: 20 (11-12-21 @ 04:03)  SpO2: 95% (11-12-21 @ 04:03)    I and O's:    11-11 @ 07:01  -  11-12 @ 07:00  --------------------------------------------------------  IN: 800 mL / OUT: 2400 mL / NET: -1600 mL    PHYSICAL EXAM:  Constitutional: NAD  Neck:  No JVD  Respiratory: CTAB/L  Cardiovascular: S1 and S2  Gastrointestinal: BS+, soft, NT/ND  Extremities: No peripheral edema  Neurological: A/O x 3, no focal deficits  Psychiatric: Normal mood, normal affect  : No Javier  Skin: No rashes  Access:    LABS:                        8.4    13.69 )-----------( 364      ( 12 Nov 2021 04:50 )             27.4     11-12    138  |  98  |  44<H>  ----------------------------<  110<H>  4.1   |  23  |  4.64<H>    Ca    9.1      12 Nov 2021 04:50  Phos  4.4     11-12  Mg     2.2     11-12     NEPHROLOGY    Patient seen and examined. Pt without complaints, no pain, no sob, in no acute distress. HD yesterday removed 1.6L.     MEDICATIONS  (STANDING):  ascorbic acid 500 milliGRAM(s) Oral daily  atorvastatin 80 milliGRAM(s) Oral at bedtime  carbidopa/levodopa  25/100 2.5 Tablet(s) Oral <User Schedule>  carbidopa/levodopa  25/100 2 Tablet(s) Oral <User Schedule>  chlorhexidine 4% Liquid 1 Application(s) Topical <User Schedule>  cholecalciferol 1000 Unit(s) Oral daily  dextrose 40% Gel 15 Gram(s) Oral once  dextrose 5%. 1000 milliLiter(s) (50 mL/Hr) IV Continuous <Continuous>  dextrose 5%. 1000 milliLiter(s) (100 mL/Hr) IV Continuous <Continuous>  dextrose 50% Injectable 25 Gram(s) IV Push once  dextrose 50% Injectable 12.5 Gram(s) IV Push once  dextrose 50% Injectable 25 Gram(s) IV Push once  folic acid 1 milliGRAM(s) Oral daily  glucagon  Injectable 1 milliGRAM(s) IntraMuscular once  insulin glargine Injectable (LANTUS) 6 Unit(s) SubCutaneous at bedtime  insulin lispro (ADMELOG) corrective regimen sliding scale   SubCutaneous three times a day before meals  insulin lispro (ADMELOG) corrective regimen sliding scale   SubCutaneous at bedtime  levothyroxine 25 MICROGram(s) Oral daily  metoprolol tartrate 25 milliGRAM(s) Oral two times a day  midodrine 20 milliGRAM(s) Oral <User Schedule>  polyethylene glycol 3350 17 Gram(s) Oral two times a day  QUEtiapine 25 milliGRAM(s) Oral at bedtime  senna Syrup 10 milliLiter(s) Oral at bedtime  sevelamer carbonate Powder 1600 milliGRAM(s) Oral three times a day  trihexyphenidyl 2 milliGRAM(s) Oral three times a day    VITALS:  T(C): , Max: 37.2 (11-12-21 @ 04:03)  T(F): , Max: 98.9 (11-12-21 @ 04:03)  HR: 119 (11-12-21 @ 04:03)  BP: 176/78 (11-12-21 @ 04:03)  BP(mean): 94 (11-11-21 @ 18:30)  RR: 20 (11-12-21 @ 04:03)  SpO2: 95% (11-12-21 @ 04:03)    I and O's:    11-11 @ 07:01  -  11-12 @ 07:00  --------------------------------------------------------  IN: 800 mL / OUT: 2400 mL / NET: -1600 mL    PHYSICAL EXAM:  Constitutional: NAD  Neck:  No JVD  Respiratory: CTAB/L  Cardiovascular: S1 and S2  Gastrointestinal: BS+, soft, NT/ND  Extremities: tr edema   Neurological: A/O x 3, no focal deficits  Psychiatric: Normal mood, normal affect  : No Javier  Skin: No rashes  Access: RI catheter     LABS:                        8.4    13.69 )-----------( 364      ( 12 Nov 2021 04:50 )             27.4     11-12    138  |  98  |  44<H>  ----------------------------<  110<H>  4.1   |  23  |  4.64<H>    Ca    9.1      12 Nov 2021 04:50  Phos  4.4     11-12  Mg     2.2     11-12     NEPHROLOGY    Patient seen and examined. Pt without complaints, no pain, no sob, in no acute distress. HD yesterday removed 1.6L.     MEDICATIONS  (STANDING):  ascorbic acid 500 milliGRAM(s) Oral daily  atorvastatin 80 milliGRAM(s) Oral at bedtime  carbidopa/levodopa  25/100 2.5 Tablet(s) Oral <User Schedule>  carbidopa/levodopa  25/100 2 Tablet(s) Oral <User Schedule>  chlorhexidine 4% Liquid 1 Application(s) Topical <User Schedule>  cholecalciferol 1000 Unit(s) Oral daily  dextrose 40% Gel 15 Gram(s) Oral once  dextrose 5%. 1000 milliLiter(s) (50 mL/Hr) IV Continuous <Continuous>  dextrose 5%. 1000 milliLiter(s) (100 mL/Hr) IV Continuous <Continuous>  dextrose 50% Injectable 25 Gram(s) IV Push once  dextrose 50% Injectable 12.5 Gram(s) IV Push once  dextrose 50% Injectable 25 Gram(s) IV Push once  folic acid 1 milliGRAM(s) Oral daily  glucagon  Injectable 1 milliGRAM(s) IntraMuscular once.  insulin glargine Injectable (LANTUS) 6 Unit(s) SubCutaneous at bedtime  insulin lispro (ADMELOG) corrective regimen sliding scale   SubCutaneous three times a day before meals  insulin lispro (ADMELOG) corrective regimen sliding scale   SubCutaneous at bedtime  levothyroxine 25 MICROGram(s) Oral daily  metoprolol tartrate 25 milliGRAM(s) Oral two times a day  midodrine 20 milliGRAM(s) Oral <User Schedule>  polyethylene glycol 3350 17 Gram(s) Oral two times a day  QUEtiapine 25 milliGRAM(s) Oral at bedtime  senna Syrup 10 milliLiter(s) Oral at bedtime  sevelamer carbonate Powder 1600 milliGRAM(s) Oral three times a day  trihexyphenidyl 2 milliGRAM(s) Oral three times a day    VITALS:  T(C): , Max: 37.2 (11-12-21 @ 04:03)  T(F): , Max: 98.9 (11-12-21 @ 04:03)  HR: 119 (11-12-21 @ 04:03)  BP: 176/78 (11-12-21 @ 04:03)  BP(mean): 94 (11-11-21 @ 18:30)  RR: 20 (11-12-21 @ 04:03)  SpO2: 95% (11-12-21 @ 04:03)    I and O's:    11-11 @ 07:01  -  11-12 @ 07:00  --------------------------------------------------------  IN: 800 mL / OUT: 2400 mL / NET: -1600 mL    PHYSICAL EXAM:  Constitutional: NAD  Neck:  No JVD  Respiratory: CTAB/L  Cardiovascular: S1 and S2  Gastrointestinal: BS+, soft, NT/ND  Extremities: tr edema   Neurological: A/O x 3, no focal deficits  Psychiatric: Normal mood, normal affect  : No Javier  Skin: No rashes  Access: RIJ catheter perm     LABS:                        8.4    13.69 )-----------( 364      ( 12 Nov 2021 04:50 )             27.4     11-12    138  |  98  |  44<H>  ----------------------------<  110<H>  4.1   |  23  |  4.64<H>    Ca    9.1      12 Nov 2021 04:50  Phos  4.4     11-12  Mg     2.2     11-12

## 2021-11-12 NOTE — PROGRESS NOTE ADULT - ASSESSMENT
Assessment  DMT2: 71y Male with DM T2 with hyperglycemia, A1C 6.4%, was on insulin at home, now on low-dose basal insulin and coverage, blood sugars are stable and trending within acceptable range, no hypoglycemic episodes.  Hypothyroidism: Patient has no history thyroid disease, was not on any thyroid supplements, subclinical with low-normal FT4, lethargic, started on synthroid 25 mcg po daily, FT4 improved to 1.2.  CHF: on medications, stable, monitored.  HTN: Controlled,  on antihypertensive medications.  Parkinsons: on meds, monitored.  CKD: Monitor labs/BMP      Kelsie Reece MD  Cell: [phone]  Office: [phone]     Assessment  DMT2: 71y Male with DM T2 with hyperglycemia, A1C 6.4%, was on insulin at home, now on low-dose basal insulin and coverage,  blood sugars are stable and trending within acceptable range, no hypoglycemic episodes.  Hypothyroidism: Patient has no history thyroid disease, was not on any thyroid supplements, subclinical with low-normal FT4, lethargic, started on synthroid 25 mcg po daily, FT4 improved to 1.2.  CHF: on medications, stable, monitored.  HTN: Controlled,  on antihypertensive medications.  Parkinsons: on meds, monitored.  CKD: Monitor labs/BMP      Kelsie Reece MD  Cell: [phone]  Office: [phone]

## 2021-11-12 NOTE — PROGRESS NOTE ADULT - ASSESSMENT
ABBY now on HD  Acute on chronic systolic CHF with fluid overload  Bilateral L>>R pleural effusions  S/P RP bleed on AC   Atrial Flutter/Fib    REC    Monitor hb  HD per renal  AVF today in OR

## 2021-11-12 NOTE — PROGRESS NOTE ADULT - SUBJECTIVE AND OBJECTIVE BOX
SUBJECTIVE / OVERNIGHT EVENTS:    INCOMPLETE NOTE      --------------------------------------------------------------------------------------------  LABS:                        8.4    13.69 )-----------( 364      ( 12 Nov 2021 04:50 )             27.4     11-12    138  |  98  |  44<H>  ----------------------------<  110<H>  4.1   |  23  |  4.64<H>    Ca    9.1      12 Nov 2021 04:50  Phos  4.4     11-12  Mg     2.2     11-12      PT/INR - ( 12 Nov 2021 04:50 )   PT: 13.3 sec;   INR: 1.11 ratio         PTT - ( 12 Nov 2021 04:50 )  PTT:56.1 sec  CAPILLARY BLOOD GLUCOSE      POCT Blood Glucose.: 127 mg/dL (12 Nov 2021 07:40)  POCT Blood Glucose.: 119 mg/dL (12 Nov 2021 00:57)  POCT Blood Glucose.: 137 mg/dL (11 Nov 2021 21:57)  POCT Blood Glucose.: 122 mg/dL (11 Nov 2021 20:00)  POCT Blood Glucose.: 135 mg/dL (11 Nov 2021 11:50)            RADIOLOGY & ADDITIONAL TESTS:    Imaging Personally Reviewed:  [x] YES  [ ] NO    Consultant(s) Notes Reviewed:  [x] YES  [ ] NO    MEDICATIONS  (STANDING):  ascorbic acid 500 milliGRAM(s) Oral daily  atorvastatin 80 milliGRAM(s) Oral at bedtime  carbidopa/levodopa  25/100 2.5 Tablet(s) Oral <User Schedule>  carbidopa/levodopa  25/100 2 Tablet(s) Oral <User Schedule>  chlorhexidine 4% Liquid 1 Application(s) Topical <User Schedule>  cholecalciferol 1000 Unit(s) Oral daily  dextrose 40% Gel 15 Gram(s) Oral once  dextrose 5%. 1000 milliLiter(s) (50 mL/Hr) IV Continuous <Continuous>  dextrose 5%. 1000 milliLiter(s) (100 mL/Hr) IV Continuous <Continuous>  dextrose 50% Injectable 25 Gram(s) IV Push once  dextrose 50% Injectable 12.5 Gram(s) IV Push once  dextrose 50% Injectable 25 Gram(s) IV Push once  folic acid 1 milliGRAM(s) Oral daily  glucagon  Injectable 1 milliGRAM(s) IntraMuscular once  insulin glargine Injectable (LANTUS) 6 Unit(s) SubCutaneous at bedtime  insulin lispro (ADMELOG) corrective regimen sliding scale   SubCutaneous three times a day before meals  insulin lispro (ADMELOG) corrective regimen sliding scale   SubCutaneous at bedtime  levothyroxine 25 MICROGram(s) Oral daily  metoprolol tartrate 25 milliGRAM(s) Oral two times a day  midodrine 20 milliGRAM(s) Oral <User Schedule>  polyethylene glycol 3350 17 Gram(s) Oral two times a day  QUEtiapine 25 milliGRAM(s) Oral at bedtime  senna Syrup 10 milliLiter(s) Oral at bedtime  sevelamer carbonate Powder 1600 milliGRAM(s) Oral three times a day  trihexyphenidyl 2 milliGRAM(s) Oral three times a day    MEDICATIONS  (PRN):  acetaminophen     Tablet .. 650 milliGRAM(s) Oral every 6 hours PRN Mild Pain (1 - 3)  albuterol/ipratropium for Nebulization 3 milliLiter(s) Nebulizer every 6 hours PRN Shortness of Breath and/or Wheezing  guaiFENesin Oral Liquid (Sugar-Free) 200 milliGRAM(s) Oral every 6 hours PRN Cough  sodium chloride 0.9% lock flush 10 milliLiter(s) IV Push every 1 hour PRN Pre/post blood products, medications, blood draw, and to maintain line patency      Care Discussed with Consultants/Other Providers [x] YES  [ ] NO    Vital Signs Last 24 Hrs  T(C): 37.2 (12 Nov 2021 04:03), Max: 37.2 (12 Nov 2021 04:03)  T(F): 98.9 (12 Nov 2021 04:03), Max: 98.9 (12 Nov 2021 04:03)  HR: 119 (12 Nov 2021 04:03) (72 - 119)  BP: 176/78 (12 Nov 2021 04:03) (125/51 - 183/104)  BP(mean): 94 (11 Nov 2021 18:30) (83 - 94)  RR: 20 (12 Nov 2021 04:03) (12 - 20)  SpO2: 95% (12 Nov 2021 04:03) (94% - 100%)  I&O's Summary    11 Nov 2021 07:01  -  12 Nov 2021 07:00  --------------------------------------------------------  IN: 800 mL / OUT: 2400 mL / NET: -1600 mL      PE:  GENERAL: NAD, AAOx2  HEAD:  Atraumatic, Normocephalic, right neck shiley  CHEST/LUNG: Coarse bs  HEART: Regular rate and rhythm; no murmur  ABDOMEN: Soft, Nontender, Nondistended; Bowel sounds present  EXTREMITIES:  2+ Peripheral Pulses, +1 le edema, bl mittens  NEURO: No focal deficits     SUBJECTIVE / OVERNIGHT EVENTS:    still confused. no cp sob nvd abd pain. mild tachycardia. some bleeding from shiley removal site overnight. dressing dry.    --------------------------------------------------------------------------------------------  LABS:                        8.4    13.69 )-----------( 364      ( 12 Nov 2021 04:50 )             27.4     11-12    138  |  98  |  44<H>  ----------------------------<  110<H>  4.1   |  23  |  4.64<H>    Ca    9.1      12 Nov 2021 04:50  Phos  4.4     11-12  Mg     2.2     11-12      PT/INR - ( 12 Nov 2021 04:50 )   PT: 13.3 sec;   INR: 1.11 ratio         PTT - ( 12 Nov 2021 04:50 )  PTT:56.1 sec  CAPILLARY BLOOD GLUCOSE      POCT Blood Glucose.: 127 mg/dL (12 Nov 2021 07:40)  POCT Blood Glucose.: 119 mg/dL (12 Nov 2021 00:57)  POCT Blood Glucose.: 137 mg/dL (11 Nov 2021 21:57)  POCT Blood Glucose.: 122 mg/dL (11 Nov 2021 20:00)  POCT Blood Glucose.: 135 mg/dL (11 Nov 2021 11:50)            RADIOLOGY & ADDITIONAL TESTS:    Imaging Personally Reviewed:  [x] YES  [ ] NO    Consultant(s) Notes Reviewed:  [x] YES  [ ] NO    MEDICATIONS  (STANDING):  ascorbic acid 500 milliGRAM(s) Oral daily  atorvastatin 80 milliGRAM(s) Oral at bedtime  carbidopa/levodopa  25/100 2.5 Tablet(s) Oral <User Schedule>  carbidopa/levodopa  25/100 2 Tablet(s) Oral <User Schedule>  chlorhexidine 4% Liquid 1 Application(s) Topical <User Schedule>  cholecalciferol 1000 Unit(s) Oral daily  dextrose 40% Gel 15 Gram(s) Oral once  dextrose 5%. 1000 milliLiter(s) (50 mL/Hr) IV Continuous <Continuous>  dextrose 5%. 1000 milliLiter(s) (100 mL/Hr) IV Continuous <Continuous>  dextrose 50% Injectable 25 Gram(s) IV Push once  dextrose 50% Injectable 12.5 Gram(s) IV Push once  dextrose 50% Injectable 25 Gram(s) IV Push once  folic acid 1 milliGRAM(s) Oral daily  glucagon  Injectable 1 milliGRAM(s) IntraMuscular once  insulin glargine Injectable (LANTUS) 6 Unit(s) SubCutaneous at bedtime  insulin lispro (ADMELOG) corrective regimen sliding scale   SubCutaneous three times a day before meals  insulin lispro (ADMELOG) corrective regimen sliding scale   SubCutaneous at bedtime  levothyroxine 25 MICROGram(s) Oral daily  metoprolol tartrate 25 milliGRAM(s) Oral two times a day  midodrine 20 milliGRAM(s) Oral <User Schedule>  polyethylene glycol 3350 17 Gram(s) Oral two times a day  QUEtiapine 25 milliGRAM(s) Oral at bedtime  senna Syrup 10 milliLiter(s) Oral at bedtime  sevelamer carbonate Powder 1600 milliGRAM(s) Oral three times a day  trihexyphenidyl 2 milliGRAM(s) Oral three times a day    MEDICATIONS  (PRN):  acetaminophen     Tablet .. 650 milliGRAM(s) Oral every 6 hours PRN Mild Pain (1 - 3)  albuterol/ipratropium for Nebulization 3 milliLiter(s) Nebulizer every 6 hours PRN Shortness of Breath and/or Wheezing  guaiFENesin Oral Liquid (Sugar-Free) 200 milliGRAM(s) Oral every 6 hours PRN Cough  sodium chloride 0.9% lock flush 10 milliLiter(s) IV Push every 1 hour PRN Pre/post blood products, medications, blood draw, and to maintain line patency      Care Discussed with Consultants/Other Providers [x] YES  [ ] NO    Vital Signs Last 24 Hrs  T(C): 37.2 (12 Nov 2021 04:03), Max: 37.2 (12 Nov 2021 04:03)  T(F): 98.9 (12 Nov 2021 04:03), Max: 98.9 (12 Nov 2021 04:03)  HR: 119 (12 Nov 2021 04:03) (72 - 119)  BP: 176/78 (12 Nov 2021 04:03) (125/51 - 183/104)  BP(mean): 94 (11 Nov 2021 18:30) (83 - 94)  RR: 20 (12 Nov 2021 04:03) (12 - 20)  SpO2: 95% (12 Nov 2021 04:03) (94% - 100%)  I&O's Summary    11 Nov 2021 07:01  -  12 Nov 2021 07:00  --------------------------------------------------------  IN: 800 mL / OUT: 2400 mL / NET: -1600 mL      PE:  GENERAL: NAD, AAOx1  HEAD:  Atraumatic, Normocephalic, permacath, shiley removed dressing CDI  CHEST/LUNG: Coarse bs  HEART: Regular rate and rhythm; no murmur  ABDOMEN: Soft, Nontender, Nondistended; Bowel sounds present  EXTREMITIES:  2+ Peripheral Pulses, +1 le edema, bl mittens  NEURO: No focal deficits

## 2021-11-12 NOTE — PROGRESS NOTE ADULT - SUBJECTIVE AND OBJECTIVE BOX
Vascular Surgery    SUBJECTIVE: Pt seen and examined on rounds with team. No acute events overnight.        OBJECTIVE    PHYSICAL EXAM:   General: NAD, Lying in bed, confused  Neuro: Awake and alert      VITALS  T(C): 37.2 (11-12-21 @ 04:03), Max: 37.2 (11-12-21 @ 04:03)  HR: 119 (11-12-21 @ 04:03) (72 - 119)  BP: 176/78 (11-12-21 @ 04:03) (125/51 - 183/104)  RR: 20 (11-12-21 @ 04:03) (12 - 20)  SpO2: 95% (11-12-21 @ 04:03) (94% - 100%)  CAPILLARY BLOOD GLUCOSE      POCT Blood Glucose.: 119 mg/dL (12 Nov 2021 00:57)  POCT Blood Glucose.: 137 mg/dL (11 Nov 2021 21:57)  POCT Blood Glucose.: 122 mg/dL (11 Nov 2021 20:00)  POCT Blood Glucose.: 135 mg/dL (11 Nov 2021 11:50)  POCT Blood Glucose.: 115 mg/dL (11 Nov 2021 08:18)      Is/Os    11-11 @ 07:01  -  11-12 @ 07:00  --------------------------------------------------------  IN:    Other (mL): 800 mL  Total IN: 800 mL    OUT:    Other (mL): 2400 mL    Voided (mL): 0 mL  Total OUT: 2400 mL    Total NET: -1600 mL          MEDICATIONS (STANDING): acetaminophen     Tablet .. 650 milliGRAM(s) Oral every 6 hours  ascorbic acid 500 milliGRAM(s) Oral daily  atorvastatin 80 milliGRAM(s) Oral at bedtime  carbidopa/levodopa  25/100 2.5 Tablet(s) Oral <User Schedule>  carbidopa/levodopa  25/100 2 Tablet(s) Oral <User Schedule>  cholecalciferol 1000 Unit(s) Oral daily  dextrose 40% Gel 15 Gram(s) Oral once  dextrose 5%. 1000 milliLiter(s) IV Continuous <Continuous>  dextrose 5%. 1000 milliLiter(s) IV Continuous <Continuous>  dextrose 50% Injectable 25 Gram(s) IV Push once  dextrose 50% Injectable 12.5 Gram(s) IV Push once  dextrose 50% Injectable 25 Gram(s) IV Push once  folic acid 1 milliGRAM(s) Oral daily  glucagon  Injectable 1 milliGRAM(s) IntraMuscular once  insulin glargine Injectable (LANTUS) 6 Unit(s) SubCutaneous at bedtime  insulin lispro (ADMELOG) corrective regimen sliding scale   SubCutaneous three times a day before meals  insulin lispro (ADMELOG) corrective regimen sliding scale   SubCutaneous at bedtime  levothyroxine 25 MICROGram(s) Oral daily  metoprolol tartrate 25 milliGRAM(s) Oral two times a day  midodrine 20 milliGRAM(s) Oral <User Schedule>  polyethylene glycol 3350 17 Gram(s) Oral two times a day  QUEtiapine 25 milliGRAM(s) Oral at bedtime  senna Syrup 10 milliLiter(s) Oral at bedtime  sevelamer carbonate Powder 1600 milliGRAM(s) Oral three times a day  trihexyphenidyl 2 milliGRAM(s) Oral three times a day    MEDICATIONS (PRN):acetaminophen     Tablet .. 650 milliGRAM(s) Oral every 6 hours PRN Mild Pain (1 - 3)  albuterol/ipratropium for Nebulization 3 milliLiter(s) Nebulizer every 6 hours PRN Shortness of Breath and/or Wheezing  guaiFENesin Oral Liquid (Sugar-Free) 200 milliGRAM(s) Oral every 6 hours PRN Cough  sodium chloride 0.9% lock flush 10 milliLiter(s) IV Push every 1 hour PRN Pre/post blood products, medications, blood draw, and to maintain line patency      LABS  CBC (11-12 @ 04:50)                              8.4<L>                         13.69<H>  )----------------(  364        --    % Neutrophils, --    % Lymphocytes, ANC: --                                  27.4<L>  CBC (11-11 @ 05:49)                              7.7<L>                         11.30<H>  )----------------(  356        --    % Neutrophils, --    % Lymphocytes, ANC: --                                  25.7<L>    BMP (11-12 @ 04:50)             138     |  98      |  44<H> 		Ca++ --      Ca 9.1                ---------------------------------( 110<H>		Mg 2.2                4.1     |  23      |  4.64<H>			Ph 4.4     BMP (11-11 @ 05:49)             137     |  98      |  74<H> 		Ca++ --      Ca 9.1                ---------------------------------( 101<H>		Mg 2.6                4.4     |  24      |  6.84<H>			Ph 6.2<H>      Sixto (11-12 @ 04:50)  aPTT 56.1<H> / INR 1.11 / PT 13.3            IMAGING STUDIES

## 2021-11-12 NOTE — PROVIDER CONTACT NOTE (CHANGE IN STATUS NOTIFICATION) - SITUATION
Patient with bleeding and right chest wall neck dressing region.  PA made aware and evaluated patient at bedside.  Patient dressing redressed and currently clean dry and intact.

## 2021-11-12 NOTE — PROGRESS NOTE ADULT - ASSESSMENT
72yo M w/ PMHx of CAD (s/p CABG 2019), CKD (unknown stage), DM2, Parkinson's Disease, HTN, depression presents with new onset acute heart failure exacerbation, NSTEMI, started on hep gtt, developed bl RP bleed, acute anemia. sp MICU course for hemorrhagic shock, 10/28 sp IR embolization l4 and l5 lumbar artery. for permacath and AVF.    # Acute systolic and diastolic heart failure, improved  # NSTEMI- conservative mgmt dt renal function and anemia-bld loss  # ESRD, new HD  # Atrial flutter  # acute hypoxic resp failure, improved  # hemorrhagic shock, left RP hematoma, acute blood loss anemia  Echo TTE mod to severe LV SD, hypokinesis anterior wall, not candidate for cath at this point  HD per renal  10/28 sp IR embolization l4 and l5 lumbar artery  on midodrine during dialysis  leucocytosis overall improved, cont to monitor, afebrile  monitor h/h, transfuse <7  plan for permacath placement today and vascular AVF tomorrow  pt mod-high risk for vascular procedure, but medically optimized    # DM2 (diabetes mellitus, type 2)  per endo  hba1c 6.4    # CAD (coronary artery disease)  c/w atorvastatin  asa on hold    # Parkinsons disease   # delirium  carbidopa/levodopa   c/w trihexyphenidyl  seroquel  will ask neuro to evaluate given confusion    PCP Dr. Simons    please call [facility] @ [phone] for questions or concerns 70yo M w/ PMHx of CAD (s/p CABG 2019), CKD (unknown stage), DM2, Parkinson's Disease, HTN, depression presents with new onset acute heart failure exacerbation, NSTEMI, started on hep gtt, developed bl RP bleed, acute anemia. sp MICU course for hemorrhagic shock, 10/28 sp IR embolization l4 and l5 lumbar artery. for permacath and AVF.    # Acute systolic and diastolic heart failure, improved  # NSTEMI- conservative mgmt dt renal function and anemia-bld loss  # ESRD, new HD  # Atrial flutter  # acute hypoxic resp failure, improved  # hemorrhagic shock, left RP hematoma, acute blood loss anemia  Echo TTE mod to severe LV SD, hypokinesis anterior wall, not candidate for cath at this point  HD per renal  10/28 sp IR embolization l4 and l5 lumbar artery  on midodrine during dialysis  leukocytosis overall improved, cont to monitor, afebrile  monitor h/h, transfuse <7  sp permacath placement, monitor for blding  NPO for AVF    # DM2 (diabetes mellitus, type 2)  per endo  hba1c 6.4    # CAD (coronary artery disease)  # HTN  cont lopressor  c/w atorvastatin  asa on hold    # Parkinsons disease   # delirium  carbidopa/levodopa   c/w trihexyphenidyl  seroquel  neuro consult for confusion AMS    PCP Dr. Simons    please call [facility] @ [phone] for questions or concerns

## 2021-11-12 NOTE — CHART NOTE - NSCHARTNOTEFT_GEN_A_CORE
Notified by RN of patient with saturated right IJ Shiley dressing which was removed yesterday when permacath was placed. Patient seen and examined bedside. Hemostatic pressure dressing applied to area. Will continue to monitor site. Patient not currently on any anticoagulation. Will send STAT CBC to monitor hemoglobin. Will continue to monitor patient overnight. Will notify day team of overnight events.    Francine Green [facility]  [id number] Notified by RN of patient with saturated right IJ Shiley dressing which was removed yesterday when permacath was placed. Patient seen and examined bedside. Hemostatic pressure dressing applied to area. Will continue to monitor site. Patient not currently on any anticoagulation. Will send STAT CBC to monitor hemoglobin. Of note, patient with elevated heart rate in the 110s; aflutter. Patient is asymptomatic and will give AM dose of metoprolol now. RN notified. Will continue to monitor patient overnight. Will notify day team of overnight events.    Francine Green PA   Medicine  [id number]

## 2021-11-12 NOTE — PROGRESS NOTE ADULT - SUBJECTIVE AND OBJECTIVE BOX
Follow-up Pulm Progress Note  Brayan Verma MD  [phone]    For AVF today  Stable respiratory status      Vital Signs Last 24 Hrs  T(C): 37.1 (12 Nov 2021 11:23), Max: 37.2 (12 Nov 2021 04:03)  T(F): 98.7 (12 Nov 2021 11:23), Max: 98.9 (12 Nov 2021 04:03)  HR: 98 (12 Nov 2021 11:23) (72 - 119)  BP: 132/78 (12 Nov 2021 11:23) (108/72 - 183/104)  BP(mean): 94 (11 Nov 2021 18:30) (83 - 94)  RR: 18 (12 Nov 2021 11:23) (12 - 20)  SpO2: 96% (12 Nov 2021 11:23) (94% - 100%)                          8.4    13.69 )-----------( 364      ( 12 Nov 2021 04:50 )             27.4     11-12    138  |  98  |  44<H>  ----------------------------<  110<H>  4.1   |  23  |  4.64<H>    Ca    9.1      12 Nov 2021 04:50  Phos  4.4     11-12  Mg     2.2     11-12      Physical Examination:  PULM: Diminished basilar BS  CVS: Regular rate and rhythm, no murmurs, rubs, or gallops  ABD: Soft, non-tender  EXT:  No clubbing, cyanosis, or edema    RADIOLOGY REVIEWED  CXR:    CT chest:    TTE:

## 2021-11-12 NOTE — CONSULT NOTE ADULT - SUBJECTIVE AND OBJECTIVE BOX
[facility]), [facility]        Patient is a 71y old  Male who presents with a chief complaint of leg swelling (2021 13:22)    Excerpt from H&P,"  70yo M w/ PMHx of CAD (s/p CABG 2019), CKD (unknown stage), DM2, Parkinson's Disease, HTN, depression presents with bilateral leg swelling, patient's daughter in-law at bedside Yoly Quintero (4 [facility] Nurse) provides the history, the daughter reports the patient is a poor historian, unable to remember having conversations with others and likely cannot weigh risk/benefits of medical conditions, she reports that he has had bilateral lower extremity swelling for the past few months, but over the past 2 weeks it has significantly worsened, he has further difficulty ambulating due to swelling, his outpatient provider attempted to manage with 20mg lasix and then increased to 40mg lasix but the patient never took the increased dose, his symptoms ar persistent throughout the day and are extremely severe, he has developed wounds of the legs with weeping of fluid due to the swelling, she reports that the patient is frequently non-compliant with medications and medical management, he lives at home with his son and daughter in law, he denies chest pain, shortness of breath, fever/chills, cough, sputum, in the ED, he was tachycardic but hemodynamically stable, afebrile, saturating well on room air, labs were significant for elevated BNP, elevated creatinine, imaging showed moderate left pleural effusion, patient was admitted to general medicine for further management            *****PAST MEDICAL / Surgical  HISTORY:  PAST MEDICAL & SURGICAL HISTORY:  Hypertension    Diabetes Mellitus, Type 2    Hypercholesterolemia    Parkinson disease    CAD (coronary artery disease)  s/p 1 stent in  and CABG 2019    S/P CABG (coronary artery bypass graft)  2019    S/P hip replacement, right  2016    Anxiety    Depression, major             *****FAMILY HISTORY:  FAMILY HISTORY:  Family history of hypertension    Family history of malaria    Family history of pulmonary fibrosis (Sibling)             *****SOCIAL HISTORY:  Alcohol: None  Smoking: None         *****ALLERGIES:   Allergies    adhesives (Rash)  latex (Urticaria)  No Known Drug Allergies    Intolerances             *****MEDICATIONS: current medication reviewed and documented.   MEDICATIONS  (STANDING):  ascorbic acid 500 milliGRAM(s) Oral daily  atorvastatin 80 milliGRAM(s) Oral at bedtime  carbidopa/levodopa  25/100 2.5 Tablet(s) Oral <User Schedule>  carbidopa/levodopa  25/100 2 Tablet(s) Oral <User Schedule>  chlorhexidine 4% Liquid 1 Application(s) Topical <User Schedule>  cholecalciferol 1000 Unit(s) Oral daily  dextrose 5%. 1000 milliLiter(s) (50 mL/Hr) IV Continuous <Continuous>  dextrose 5%. 1000 milliLiter(s) (100 mL/Hr) IV Continuous <Continuous>  dextrose 50% Injectable 25 Gram(s) IV Push once  dextrose 50% Injectable 25 Gram(s) IV Push once  dextrose 50% Injectable 12.5 Gram(s) IV Push once  folic acid 1 milliGRAM(s) Oral daily  glucagon  Injectable 1 milliGRAM(s) IntraMuscular once  insulin glargine Injectable (LANTUS) 6 Unit(s) SubCutaneous at bedtime  insulin lispro (ADMELOG) corrective regimen sliding scale   SubCutaneous three times a day before meals  insulin lispro (ADMELOG) corrective regimen sliding scale   SubCutaneous at bedtime  levothyroxine 25 MICROGram(s) Oral daily  metoprolol tartrate 25 milliGRAM(s) Oral two times a day  midodrine 20 milliGRAM(s) Oral <User Schedule>  polyethylene glycol 3350 17 Gram(s) Oral two times a day  QUEtiapine 25 milliGRAM(s) Oral at bedtime  senna Syrup 10 milliLiter(s) Oral at bedtime  trihexyphenidyl 2 milliGRAM(s) Oral three times a day    MEDICATIONS  (PRN):  acetaminophen     Tablet .. 650 milliGRAM(s) Oral every 6 hours PRN Mild Pain (1 - 3)  albuterol/ipratropium for Nebulization 3 milliLiter(s) Nebulizer every 6 hours PRN Shortness of Breath and/or Wheezing  guaiFENesin Oral Liquid (Sugar-Free) 200 milliGRAM(s) Oral every 6 hours PRN Cough  sodium chloride 0.9% lock flush 10 milliLiter(s) IV Push every 1 hour PRN Pre/post blood products, medications, blood draw, and to maintain line patency           *****REVIEW OF SYSTEM: unable to obtain          *****VITAL SIGNS:  T(C): 36.2 (21 @ 14:45), Max: 37.2 (21 @ 04:03)  HR: 112 (21 @ 14:45) (72 - 119)  BP: 135/81 (21 @ 14:45) (108/72 - 183/104)  RR: 16 (21 @ 14:45) (12 - 20)  SpO2: 98% (21 @ 14:45) (94% - 100%)  Wt(kg): --     @ 07:01  -   @ 07:00  --------------------------------------------------------  IN: 800 mL / OUT: 2400 mL / NET: -1600 mL             *****PHYSICAL EXAM:   Alert oriented x 2   Attention comprehension are limited as pt is not cooperative     Able to follow 1 step commands. delayed responses      EOMI fundi not visualized,  blinks to threat   No facial asymmetry   Tongue is midline    Moving all 4 ext symmetrically no pronator drift   Reflexes are symmetric throughout   sensation is grossly symmetric  Gait : not assessed.  B/L down going toes               *****LAB AND IMAGIN.4    13.69 )-----------( 364      ( 2021 04:50 )             27.4               -    138  |  98  |  44<H>  ----------------------------<  110<H>  4.1   |  23  |  4.64<H>    Ca    9.1      2021 04:50  Phos  4.4       Mg     2.2     11-12      PT/INR - ( 2021 04:50 )   PT: 13.3 sec;   INR: 1.11 ratio         PTT - ( 2021 04:50 )  PTT:56.1 sec                            [All pertinent recent Imaging reports reviewed]         *****A S S E S S M E N T   A N D   P L A N :     Excerpt from H&P,"  70yo M w/ PMHx of CAD (s/p CABG ), CKD (unknown stage), DM2, Parkinson's Disease, HTN, depression presents with bilateral leg swelling, patient's daughter in-law at bedside Yoly Quintero (4 [facility] Nurse) provides the history, the daughter reports the patient is a poor historian, unable to remember having conversations with others and likely cannot weigh risk/benefits of medical conditions, she reports that he has had bilateral lower extremity swelling for the past few months, but over the past 2 weeks it has significantly worsened, he has further difficulty ambulating due to swelling, his outpatient provider attempted to manage with 20mg lasix and then increased to 40mg lasix but the patient never took the increased dose, his symptoms ar persistent throughout the day and are extremely severe, he has developed wounds of the legs with weeping of fluid due to the swelling, she reports that the patient is frequently non-compliant with medications and medical management, he lives at home with his son and daughter in law, he denies chest pain, shortness of breath, fever/chills, cough, sputum, in the ED, he was tachycardic but hemodynamically stable, afebrile, saturating well on room air, labs were significant for elevated BNP, elevated creatinine, imaging showed moderate left pleural effusion, patient was admitted to general medicine for further management          Problem/Recommendations 1:ams of unclear etiology   likely related to multiple metabolic derangements related to uremia  sleep wake cycle disruption and poor nutritional intake  would regulate sleep wake cycle  check b12/b1 folate/tsh /ammonia   thiamine 500 iv q 8 x 2 days after checking vitamin b1 level  lasix related renal loss of thiamine can often results in wernickes encephalopathy wet beri beri   eeg   nutrition consult for severe protein caloric malnutrition             Problem/Recommendations 2: weakness   r/o orthostatic hypotension   pt shaking on standing up      ___________________________  Will follow with you.  Thank you,  Lily Fang MD  Diplomate of the American Board of Neurology and Psychiatry.  Diplomate of the American Board of Vascular Neurology.   [facility] ([facility]), [facility]   Ph: [phone]    Differential diagnosis and plan of care discussed with patient after the evaluation.   Advanced care planning options discussed.   Pain assessed and judicious use of narcotics when appropriate was discussed.  Importance of Fall prevention discussed.  Counseling on Smoking and Alcohol cessation was offered when appropriate.  Counseling on Diet, exercise, and medication compliance was done.   83 minutes spent on the total encounter;  more than 50 % of the visit was spent on counseling  and or coordinating care by the attending physician.    Thank you for allowing me to participate in the care of this sabrina patient. Please do not hesitate to call me if you have any questions.     This and subsequent notes  will  inherently be subject to errors including those of syntax and substitutions which may escape proofreading. In such instances original meaning may be extrapolated by contextual derivation.

## 2021-11-12 NOTE — BRIEF OPERATIVE NOTE - OPERATION/FINDINGS
Creation left upper extremity av brachiocephalic fistula via cephalic transposition. good thrill, 2+ radial at end of case

## 2021-11-12 NOTE — PROGRESS NOTE ADULT - SUBJECTIVE AND OBJECTIVE BOX
Chief complaint  Patient is a 71y old  Male who presents with a chief complaint of leg swelling (12 Nov 2021 12:38)   Review of systems  No hypoglycemic episodes.    Labs and Fingersticks  CAPILLARY BLOOD GLUCOSE      POCT Blood Glucose.: 122 mg/dL (12 Nov 2021 11:26)  POCT Blood Glucose.: 127 mg/dL (12 Nov 2021 07:40)  POCT Blood Glucose.: 119 mg/dL (12 Nov 2021 00:57)  POCT Blood Glucose.: 137 mg/dL (11 Nov 2021 21:57)  POCT Blood Glucose.: 122 mg/dL (11 Nov 2021 20:00)    Medications  MEDICATIONS  (STANDING):      Physical Exam  Vital Signs Last 12 Hrs  T(F): 98.7 (11-12-21 @ 11:23), Max: 98.9 (11-12-21 @ 04:03)  HR: 98 (11-12-21 @ 11:23) (98 - 119)  BP: 132/78 (11-12-21 @ 11:23) (108/72 - 176/78)  BP(mean): --  RR: 18 (11-12-21 @ 11:23) (18 - 20)  SpO2: 96% (11-12-21 @ 11:23) (95% - 100%)  Diagnostics    Free Thyroxine, Serum: AM Sched. Collection: 06-Nov-2021 06:00 (11-05 @ 13:18)  Free Thyroxine, Serum: AM Sched. Collection: 26-Oct-2021 06:00 (10-25 @ 11:45)  Thyroid Stimulating Hormone, Serum: AM Sched. Collection: 26-Oct-2021 06:00 (10-25 @ 11:45)  Cortisol AM, Serum: 08:00 (10-22 @ 12:47)  Free Thyroxine, Serum: AM Sched. Collection: 23-Oct-2021 06:00 (10-22 @ 12:43)  Thyroid Stimulating Hormone, Serum: AM Sched. Collection: 23-Oct-2021 06:00 (10-22 @ 12:43)

## 2021-11-13 NOTE — PROGRESS NOTE ADULT - SUBJECTIVE AND OBJECTIVE BOX
Chief complaint    Patient is a 71y old  Male who presents with a chief complaint of leg swelling (13 Nov 2021 10:43)   Review of systems  Patient in bed, appears comfortable.    Labs and Fingersticks  CAPILLARY BLOOD GLUCOSE      POCT Blood Glucose.: 154 mg/dL (13 Nov 2021 08:04)  POCT Blood Glucose.: 202 mg/dL (12 Nov 2021 21:19)  POCT Blood Glucose.: 140 mg/dL (12 Nov 2021 16:49)  POCT Blood Glucose.: 102 mg/dL (12 Nov 2021 14:31)      Anion Gap, Serum: 17 (11-13 @ 06:52)  Anion Gap, Serum: 17 (11-12 @ 04:50)      Calcium, Total Serum: 9.3 (11-13 @ 06:52)  Calcium, Total Serum: 9.1 (11-12 @ 04:50)          11-13    135  |  96  |  65<H>  ----------------------------<  138<H>  4.8   |  22  |  6.02<H>    Ca    9.3      13 Nov 2021 06:52  Phos  6.7     11-13  Mg     2.5     11-13                          7.7    12.58 )-----------( 356      ( 13 Nov 2021 06:52 )             25.8     Medications  MEDICATIONS  (STANDING):  ascorbic acid 500 milliGRAM(s) Oral daily  atorvastatin 80 milliGRAM(s) Oral at bedtime  carbidopa/levodopa  25/100 2.5 Tablet(s) Oral <User Schedule>  carbidopa/levodopa  25/100 2 Tablet(s) Oral <User Schedule>  chlorhexidine 4% Liquid 1 Application(s) Topical <User Schedule>  cholecalciferol 1000 Unit(s) Oral daily  dextrose 5%. 1000 milliLiter(s) (50 mL/Hr) IV Continuous <Continuous>  dextrose 5%. 1000 milliLiter(s) (100 mL/Hr) IV Continuous <Continuous>  dextrose 50% Injectable 25 Gram(s) IV Push once  dextrose 50% Injectable 12.5 Gram(s) IV Push once  dextrose 50% Injectable 25 Gram(s) IV Push once  epoetin mari-epbx (RETACRIT) Injectable 58328 Unit(s) IV Push once  folic acid 1 milliGRAM(s) Oral daily  glucagon  Injectable 1 milliGRAM(s) IntraMuscular once  insulin glargine Injectable (LANTUS) 6 Unit(s) SubCutaneous at bedtime  insulin lispro (ADMELOG) corrective regimen sliding scale   SubCutaneous three times a day before meals  insulin lispro (ADMELOG) corrective regimen sliding scale   SubCutaneous at bedtime  levothyroxine 25 MICROGram(s) Oral daily  metoprolol tartrate 25 milliGRAM(s) Oral two times a day  midodrine 20 milliGRAM(s) Oral <User Schedule>  polyethylene glycol 3350 17 Gram(s) Oral two times a day  QUEtiapine 25 milliGRAM(s) Oral at bedtime  senna Syrup 10 milliLiter(s) Oral at bedtime  trihexyphenidyl 2 milliGRAM(s) Oral three times a day      Physical Exam  General: Patient comfortable in bed  Vital Signs Last 12 Hrs  T(F): 97.7 (11-13-21 @ 10:50), Max: 97.9 (11-13-21 @ 04:37)  HR: 112 (11-13-21 @ 10:50) (112 - 112)  BP: 131/72 (11-13-21 @ 10:50) (128/78 - 131/72)  BP(mean): --  RR: 18 (11-13-21 @ 10:50) (16 - 18)  SpO2: 97% (11-13-21 @ 10:50) (97% - 97%)  Neck: No palpable thyroid nodules.  CVS: S1S2, No murmurs  Respiratory: No wheezing, no crepitations  GI: Abdomen soft, bowel sounds positive  Musculoskeletal:  edema lower extremities.     Diagnostics

## 2021-11-13 NOTE — PROGRESS NOTE ADULT - ASSESSMENT
Assessment  DMT2: 71y Male with DM T2 with hyperglycemia, A1C 6.4%, was on insulin at home, now on low-dose basal insulin and coverage, blood sugars are improving, no hypoglycemic episodes.  Hypothyroidism: Patient has no history thyroid disease, was not on any thyroid supplements, subclinical with low-normal FT4, lethargic, started on synthroid 25 mcg po daily, FT4 improved to 1.2.  CHF: on medications, stable, monitored.  HTN: Controlled,  on antihypertensive medications.  Parkinsons: on meds, monitored.  CKD: Monitor labs/BMP      Kelsie Reece MD  Cell: [phone]  Office: [phone]

## 2021-11-13 NOTE — PROGRESS NOTE ADULT - SUBJECTIVE AND OBJECTIVE BOX
[facility]      Seen earlier today, and examined.  - Today, patient is without complaints.           *****MEDICATIONS: Current medication reviewed and documented.    MEDICATIONS  (STANDING):  ascorbic acid 500 milliGRAM(s) Oral daily  atorvastatin 80 milliGRAM(s) Oral at bedtime  carbidopa/levodopa  25/100 2.5 Tablet(s) Oral <User Schedule>  carbidopa/levodopa  25/100 2 Tablet(s) Oral <User Schedule>  chlorhexidine 4% Liquid 1 Application(s) Topical <User Schedule>  cholecalciferol 1000 Unit(s) Oral daily  dextrose 5%. 1000 milliLiter(s) (100 mL/Hr) IV Continuous <Continuous>  dextrose 5%. 1000 milliLiter(s) (50 mL/Hr) IV Continuous <Continuous>  dextrose 50% Injectable 25 Gram(s) IV Push once  dextrose 50% Injectable 12.5 Gram(s) IV Push once  dextrose 50% Injectable 25 Gram(s) IV Push once  folic acid 1 milliGRAM(s) Oral daily  glucagon  Injectable 1 milliGRAM(s) IntraMuscular once  insulin glargine Injectable (LANTUS) 6 Unit(s) SubCutaneous at bedtime  insulin lispro (ADMELOG) corrective regimen sliding scale   SubCutaneous three times a day before meals  insulin lispro (ADMELOG) corrective regimen sliding scale   SubCutaneous at bedtime  levothyroxine 25 MICROGram(s) Oral daily  metoprolol tartrate 25 milliGRAM(s) Oral two times a day  midodrine 20 milliGRAM(s) Oral <User Schedule>  polyethylene glycol 3350 17 Gram(s) Oral two times a day  QUEtiapine 25 milliGRAM(s) Oral at bedtime  senna Syrup 10 milliLiter(s) Oral at bedtime  trihexyphenidyl 2 milliGRAM(s) Oral three times a day    MEDICATIONS  (PRN):  acetaminophen     Tablet .. 650 milliGRAM(s) Oral every 6 hours PRN Mild Pain (1 - 3)  albuterol/ipratropium for Nebulization 3 milliLiter(s) Nebulizer every 6 hours PRN Shortness of Breath and/or Wheezing  guaiFENesin Oral Liquid (Sugar-Free) 200 milliGRAM(s) Oral every 6 hours PRN Cough  sodium chloride 0.9% lock flush 10 milliLiter(s) IV Push every 1 hour PRN Pre/post blood products, medications, blood draw, and to maintain line patency          ***** VITAL SIGNS:  T(F): 97.7 (21 @ 21:54), Max: 97.9 (21 @ 04:37)  HR: 108 (21 @ 21:54) (103 - 112)  BP: 141/73 (21 @ 21:54) (128/78 - 141/73)  RR: 18 (21 @ 21:54) (16 - 18)  SpO2: 96% (21 @ 21:54) (96% - 97%)  Wt(kg): --  ,   I&O's Summary    2021 07:01  -  2021 07:00  --------------------------------------------------------  IN: 340 mL / OUT: 0 mL / NET: 340 mL    2021 07:01  -  2021 23:12  --------------------------------------------------------  IN: 1240 mL / OUT: 2400 mL / NET: -1160 mL             *****PHYSICAL EXAM:   alert oriented x 2  attention comprehension are limited   pt refusing rest of exam               *****LAB AND IMAGIN.5    11.79 )-----------( 376      ( 2021 17:32 )             24.9                   135  |  96  |  65<H>  ----------------------------<  138<H>  4.8   |  22  |  6.02<H>    Ca    9.3      2021 06:52  Phos  6.7       Mg     2.5           PT/INR - ( 2021 04:50 )   PT: 13.3 sec;   INR: 1.11 ratio         PTT - ( 2021 09:24 )  PTT:55.5 sec                     [All pertinent recent Imaging/Reports reviewed]           *****A S S E S S M E N T   A N D   P L A N :        Excerpt from H&P,"  70yo M w/ PMHx of CAD (s/p CABG 2019), CKD (unknown stage), DM2, Parkinson's Disease, HTN, depression presents with bilateral leg swelling, patient's daughter in-law at bedside Yoly Quintero ([address] [place] [address] Nurse) provides the history, the daughter reports the patient is a poor historian, unable to remember having conversations with others and likely cannot weigh risk/benefits of medical conditions, she reports that he has had bilateral lower extremity swelling for the past few months, but over the past 2 weeks it has significantly worsened, he has further difficulty ambulating due to swelling, his outpatient provider attempted to manage with 20mg lasix and then increased to 40mg lasix but the patient never took the increased dose, his symptoms ar persistent throughout the day and are extremely severe, he has developed wounds of the legs with weeping of fluid due to the swelling, she reports that the patient is frequently non-compliant with medications and medical management, he lives at home with his son and daughter in law, he denies chest pain, shortness of breath, fever/chills, cough, sputum, in the ED, he was tachycardic but hemodynamically stable, afebrile, saturating well on room air, labs were significant for elevated BNP, elevated creatinine, imaging showed moderate left pleural effusion, patient was admitted to general medicine for further management          Problem/Recommendations 1:ams of unclear etiology   likely related to multiple metabolic derangements related to uremia  sleep wake cycle disruption and poor nutritional intake  would regulate sleep wake cycle  check b12/b1 folate/tsh /ammonia   thiamine 500 iv q 8 x 2 days after checking vitamin b1 level  lasix related renal loss of thiamine can often results in wernickes encephalopathy wet beri beri   eeg   nutrition consult for severe protein caloric malnutrition             Problem/Recommendations 2: weakness   r/o orthostatic hypotension   pt shaking on standing up    prob deconditioning   checj cpk   Thank you for allowing me to participate in the care of this patient. Will continue to follow patient periodically. Please do not hesitate to call me if you have any  questions or if there has been a change in patients neurological status     ________________  Lily Fang MD  [facility] ([facility]  [phone]      33 minutes spent on total encounter; more than 50 % of the visit was  spent counseling about plan of care, compliance to diet/exercise and medication regimen and or  coordinating care by the attending physician.      It is advised that stroke patients follow up with ZACH Albarran @ [phone] in 1- 2 weeks.   Others please follow up with Dr. Michael Nissenbaum [phone]

## 2021-11-13 NOTE — PROGRESS NOTE ADULT - SUBJECTIVE AND OBJECTIVE BOX
Surgery Progress Note    SUBJECTIVE: Patient seen and examined at bedside with surgical team. No acute events overnight. 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     OBJECTIVE:    Vital Signs Last 24 Hrs  T(C): 36.6 (13 Nov 2021 04:37), Max: 37.1 (12 Nov 2021 11:23)  T(F): 97.9 (13 Nov 2021 04:37), Max: 98.7 (12 Nov 2021 11:23)  HR: 112 (13 Nov 2021 04:37) (76 - 113)  BP: 128/78 (13 Nov 2021 04:37) (108/72 - 158/97)  BP(mean): 117 (12 Nov 2021 15:27) (93 - 122)  RR: 16 (13 Nov 2021 04:37) (16 - 18)  SpO2: 97% (13 Nov 2021 04:37) (95% - 100%)I&O's Detail    12 Nov 2021 07:01  -  13 Nov 2021 07:00  --------------------------------------------------------  IN:    Oral Fluid: 340 mL  Total IN: 340 mL    OUT:  Total OUT: 0 mL    Total NET: 340 mL      MEDICATIONS  (STANDING):  ascorbic acid 500 milliGRAM(s) Oral daily  atorvastatin 80 milliGRAM(s) Oral at bedtime  carbidopa/levodopa  25/100 2.5 Tablet(s) Oral <User Schedule>  carbidopa/levodopa  25/100 2 Tablet(s) Oral <User Schedule>  chlorhexidine 4% Liquid 1 Application(s) Topical <User Schedule>  cholecalciferol 1000 Unit(s) Oral daily  dextrose 5%. 1000 milliLiter(s) (50 mL/Hr) IV Continuous <Continuous>  dextrose 5%. 1000 milliLiter(s) (100 mL/Hr) IV Continuous <Continuous>  dextrose 50% Injectable 25 Gram(s) IV Push once  dextrose 50% Injectable 12.5 Gram(s) IV Push once  dextrose 50% Injectable 25 Gram(s) IV Push once  epoetin mari-epbx (RETACRIT) Injectable 40500 Unit(s) IV Push once  folic acid 1 milliGRAM(s) Oral daily  glucagon  Injectable 1 milliGRAM(s) IntraMuscular once  insulin glargine Injectable (LANTUS) 6 Unit(s) SubCutaneous at bedtime  insulin lispro (ADMELOG) corrective regimen sliding scale   SubCutaneous three times a day before meals  insulin lispro (ADMELOG) corrective regimen sliding scale   SubCutaneous at bedtime  levothyroxine 25 MICROGram(s) Oral daily  metoprolol tartrate 25 milliGRAM(s) Oral two times a day  midodrine 20 milliGRAM(s) Oral <User Schedule>  polyethylene glycol 3350 17 Gram(s) Oral two times a day  QUEtiapine 25 milliGRAM(s) Oral at bedtime  senna Syrup 10 milliLiter(s) Oral at bedtime  trihexyphenidyl 2 milliGRAM(s) Oral three times a day    MEDICATIONS  (PRN):  acetaminophen     Tablet .. 650 milliGRAM(s) Oral every 6 hours PRN Mild Pain (1 - 3)  albuterol/ipratropium for Nebulization 3 milliLiter(s) Nebulizer every 6 hours PRN Shortness of Breath and/or Wheezing  guaiFENesin Oral Liquid (Sugar-Free) 200 milliGRAM(s) Oral every 6 hours PRN Cough  sodium chloride 0.9% lock flush 10 milliLiter(s) IV Push every 1 hour PRN Pre/post blood products, medications, blood draw, and to maintain line patency      PHYSICAL EXAM:  Constitutional: A&Ox3, NAD  Respiratory: Unlabored breathing  Extremities: L AVF incision C/D/I with thrill intact. Palpable radial/ulnar pulse, No gross motor/sensory deficit.     LABS:                        7.7    12.58 )-----------( 356      ( 13 Nov 2021 06:52 )             25.8     11-13    135  |  96  |  65<H>  ----------------------------<  138<H>  4.8   |  22  |  6.02<H>    Ca    9.3      13 Nov 2021 06:52  Phos  6.7     11-13  Mg     2.5     11-13      PT/INR - ( 12 Nov 2021 04:50 )   PT: 13.3 sec;   INR: 1.11 ratio         PTT - ( 13 Nov 2021 09:24 )  PTT:55.5 sec      ABO Interpretation: A (11-12-21 @ 11:51)

## 2021-11-13 NOTE — PROGRESS NOTE ADULT - ASSESSMENT
70yo M w/ PMHx of CAD (s/p CABG 2019), CKD (unknown stage), DM2, Parkinson's Disease, HTN, depression presents with bilateral leg swelling, patient is a poor historian. ABBY on CKD, anticipate need for long term HD. Vascular consulted for AVF. Patient now s/p L brachiocephalic AVF 11/12.    - AVF site C/D/I with thrill  - follow up with Dr. Ferraro outpatient upon discharge.  - rest of care per primary team     Vascular [phone]

## 2021-11-13 NOTE — PROGRESS NOTE ADULT - ASSESSMENT
70yo M w/ PMHx of CAD (s/p CABG 2019), CKD (unknown stage), DM2, Parkinson's Disease, HTN, depression presents with new onset acute heart failure exacerbation, NSTEMI, started on hep gtt, developed bl RP bleed, acute anemia. sp MICU course for hemorrhagic shock, 10/28 sp IR embolization l4 and l5 lumbar artery. for permacath and AVF.    # Acute systolic and diastolic heart failure, improved  # NSTEMI- conservative mgmt dt renal function and anemia-bld loss  # ESRD, new HD  # Atrial flutter  # acute hypoxic resp failure, improved  # hemorrhagic shock, left RP hematoma, acute blood loss anemia  # lupus anticoagulant  Echo TTE mod to severe LV SD, hypokinesis anterior wall, not candidate for cath at this point  HD per renal  10/28 sp IR embolization l4 and l5 lumbar artery  on midodrine during dialysis  WBC stable, improved overall  monitor h/h, transfuse <7  sp permacath placement  sp L AVF  given bleeding, give 1 unit FFP    # DM2 (diabetes mellitus, type 2)  per endo  hba1c 6.4    # CAD (coronary artery disease)  # HTN  cont lopressor  c/w atorvastatin  asa on hold    # Parkinsons disease   # delirium  carbidopa/levodopa   c/w trihexyphenidyl  seroquel    PCP Dr. Simons    dispo: PT, monitor bleeding    please call [facility] @ [phone] for questions or concerns

## 2021-11-13 NOTE — PROGRESS NOTE ADULT - ASSESSMENT
s/p left upper arm AVF yesterday with intact staples  denies complaints  on room air  afebrile  Due for HD today    acetaminophen     Tablet .. 650 milliGRAM(s) Oral every 6 hours PRN  albuterol/ipratropium for Nebulization 3 milliLiter(s) Nebulizer every 6 hours PRN  ascorbic acid 500 milliGRAM(s) Oral daily  atorvastatin 80 milliGRAM(s) Oral at bedtime  carbidopa/levodopa  25/100 2.5 Tablet(s) Oral <User Schedule>  carbidopa/levodopa  25/100 2 Tablet(s) Oral <User Schedule>  chlorhexidine 4% Liquid 1 Application(s) Topical <User Schedule>  cholecalciferol 1000 Unit(s) Oral daily  dextrose 5%. 1000 milliLiter(s) IV Continuous <Continuous>  dextrose 5%. 1000 milliLiter(s) IV Continuous <Continuous>  dextrose 50% Injectable 25 Gram(s) IV Push once  dextrose 50% Injectable 12.5 Gram(s) IV Push once  dextrose 50% Injectable 25 Gram(s) IV Push once  epoetin mari-epbx (RETACRIT) Injectable 25620 Unit(s) IV Push once  folic acid 1 milliGRAM(s) Oral daily  glucagon  Injectable 1 milliGRAM(s) IntraMuscular once  guaiFENesin Oral Liquid (Sugar-Free) 200 milliGRAM(s) Oral every 6 hours PRN  insulin glargine Injectable (LANTUS) 6 Unit(s) SubCutaneous at bedtime  insulin lispro (ADMELOG) corrective regimen sliding scale   SubCutaneous three times a day before meals  insulin lispro (ADMELOG) corrective regimen sliding scale   SubCutaneous at bedtime  levothyroxine 25 MICROGram(s) Oral daily  metoprolol tartrate 25 milliGRAM(s) Oral two times a day  midodrine 20 milliGRAM(s) Oral <User Schedule>  polyethylene glycol 3350 17 Gram(s) Oral two times a day  QUEtiapine 25 milliGRAM(s) Oral at bedtime  senna Syrup 10 milliLiter(s) Oral at bedtime  sodium chloride 0.9% lock flush 10 milliLiter(s) IV Push every 1 hour PRN  trihexyphenidyl 2 milliGRAM(s) Oral three times a day      VITAL:  T(C): , Max: 36.6 (11-13-21 @ 04:37)  T(F): , Max: 97.9 (11-13-21 @ 04:37)  HR: 112 (11-13-21 @ 10:50)  BP: 131/72 (11-13-21 @ 10:50)  BP(mean): 117 (11-12-21 @ 15:27)  RR: 18 (11-13-21 @ 10:50)  SpO2: 97% (11-13-21 @ 10:50)  Wt(kg): --    11-12-21 @ 07:01  -  11-13-21 @ 07:00  --------------------------------------------------------  IN: 340 mL / OUT: 0 mL / NET: 340 mL        PHYSICAL EXAM:  Constitutional: NAD  Neck:  No JVD  Respiratory: CTAB/L  Cardiovascular: S1 and S2  Gastrointestinal: BS+, soft, NT/ND  Extremities: tr edema   Neurological: A/O x 3, no focal deficits  Psychiatric: Normal mood, normal affect  : No Javier  Skin: No rashes  Access: RIJ catheter perm , s/p left upper arm AVF yesterday with intact staples    LABS:                          7.7    12.58 )-----------( 356      ( 13 Nov 2021 06:52 )             25.8     Na(135)/K(4.8)/Cl(96)/HCO3(22)/BUN(65)/Cr(6.02)Glu(138)/Ca(9.3)/Mg(2.5)/PO4(6.7)    11-13 @ 06:52  Na(138)/K(4.1)/Cl(98)/HCO3(23)/BUN(44)/Cr(4.64)Glu(110)/Ca(9.1)/Mg(2.2)/PO4(4.4)    11-12 @ 04:50  Na(137)/K(4.4)/Cl(98)/HCO3(24)/BUN(74)/Cr(6.84)Glu(101)/Ca(9.1)/Mg(2.6)/PO4(6.2)    11-11 @ 05:49            ASSESSMENT/PLAN  Assessment:  70yo M w/ PMHx of CAD (s/p CABG 2019), CKD (unknown stage), DM2, Parkinson's Disease, HTN, depression presents with bilateral leg swelling  ESRD   Severe LV dysfunction ;  A flutter   Confusion - much alert at present     1 IR-  s/p left upper arm AVF yesterday with intact staples    2 Renal- Due for HD today. Subsequent HD Tuesday    3 CVS- BP controlled at preesent, on Midodrine (with hold parameters in place, SBP> 160),  Lopressor for heart rate control     4 Anemia - hgb down;  Retacrit at HD     5 Vasc - s/p  LUE AVF yesterday with intact staples    6 Lytes controlled        Get Delcid NP-BC  [place]  [phone]

## 2021-11-13 NOTE — CHART NOTE - NSCHARTNOTEFT_GEN_A_CORE
called by RN as pt's Rt IJ Shiley site bleeding. Shiley was removed on 11/11 and permacath was placed. Now previous Rt IJ shiley site noted to be bleeding. Pressure applied. Dressed with surgiceal. Will continue to monitor. Will check CBC. Will continue to monitor pt. VSS    Vital Signs Last 24 Hrs  T(C): 36.6 (13 Nov 2021 04:37), Max: 37.1 (12 Nov 2021 11:23)  T(F): 97.9 (13 Nov 2021 04:37), Max: 98.7 (12 Nov 2021 11:23)  HR: 112 (13 Nov 2021 04:37) (76 - 113)  BP: 128/78 (13 Nov 2021 04:37) (108/72 - 158/97)  BP(mean): 117 (12 Nov 2021 15:27) (93 - 122)  RR: 16 (13 Nov 2021 04:37) (16 - 18)  SpO2: 97% (13 Nov 2021 04:37) (95% - 100%)      Teodoro Villa NP  [id number]

## 2021-11-13 NOTE — PROGRESS NOTE ADULT - SUBJECTIVE AND OBJECTIVE BOX
Patient is a 71y old  Male who presents with a chief complaint of leg swelling (13 Nov 2021 13:15)      SUBJECTIVE / OVERNIGHT EVENTS:    Patient seen and examined. remains confused. some bleeding right chest wall overnight.      Vital Signs Last 24 Hrs  T(C): 36.5 (13 Nov 2021 10:50), Max: 36.6 (13 Nov 2021 04:37)  T(F): 97.7 (13 Nov 2021 10:50), Max: 97.9 (13 Nov 2021 04:37)  HR: 112 (13 Nov 2021 10:50) (76 - 113)  BP: 131/72 (13 Nov 2021 10:50) (118/68 - 158/97)  BP(mean): 117 (12 Nov 2021 15:27) (93 - 122)  RR: 18 (13 Nov 2021 10:50) (16 - 18)  SpO2: 97% (13 Nov 2021 10:50) (95% - 100%)  I&O's Summary    12 Nov 2021 07:01  -  13 Nov 2021 07:00  --------------------------------------------------------  IN: 340 mL / OUT: 0 mL / NET: 340 mL        PE:  GENERAL: NAD, AAOx1  HEAD:  Atraumatic, Normocephalic, permacath, shiley removed, pressure being applied to dressing  CHEST/LUNG: Coarse bs  HEART: Regular rate and rhythm; no murmur  ABDOMEN: Soft, Nontender, Nondistended; Bowel sounds present  EXTREMITIES:  2+ Peripheral Pulses, +1 le edema, bl mittens  NEURO: No focal deficits    LABS:                        7.7    12.58 )-----------( 356      ( 13 Nov 2021 06:52 )             25.8     11-13    135  |  96  |  65<H>  ----------------------------<  138<H>  4.8   |  22  |  6.02<H>    Ca    9.3      13 Nov 2021 06:52  Phos  6.7     11-13  Mg     2.5     11-13      PT/INR - ( 12 Nov 2021 04:50 )   PT: 13.3 sec;   INR: 1.11 ratio         PTT - ( 13 Nov 2021 09:24 )  PTT:55.5 sec  CAPILLARY BLOOD GLUCOSE      POCT Blood Glucose.: 145 mg/dL (13 Nov 2021 12:38)  POCT Blood Glucose.: 154 mg/dL (13 Nov 2021 08:04)  POCT Blood Glucose.: 202 mg/dL (12 Nov 2021 21:19)  POCT Blood Glucose.: 140 mg/dL (12 Nov 2021 16:49)  POCT Blood Glucose.: 102 mg/dL (12 Nov 2021 14:31)            RADIOLOGY & ADDITIONAL TESTS:    Imaging Personally Reviewed:  [x] YES  [ ] NO    Consultant(s) Notes Reviewed:  [x] YES  [ ] NO    MEDICATIONS  (STANDING):  ascorbic acid 500 milliGRAM(s) Oral daily  atorvastatin 80 milliGRAM(s) Oral at bedtime  carbidopa/levodopa  25/100 2.5 Tablet(s) Oral <User Schedule>  carbidopa/levodopa  25/100 2 Tablet(s) Oral <User Schedule>  chlorhexidine 4% Liquid 1 Application(s) Topical <User Schedule>  cholecalciferol 1000 Unit(s) Oral daily  dextrose 5%. 1000 milliLiter(s) (50 mL/Hr) IV Continuous <Continuous>  dextrose 5%. 1000 milliLiter(s) (100 mL/Hr) IV Continuous <Continuous>  dextrose 50% Injectable 25 Gram(s) IV Push once  dextrose 50% Injectable 25 Gram(s) IV Push once  dextrose 50% Injectable 12.5 Gram(s) IV Push once  epoetin mari-epbx (RETACRIT) Injectable 24441 Unit(s) IV Push once  folic acid 1 milliGRAM(s) Oral daily  glucagon  Injectable 1 milliGRAM(s) IntraMuscular once  insulin glargine Injectable (LANTUS) 6 Unit(s) SubCutaneous at bedtime  insulin lispro (ADMELOG) corrective regimen sliding scale   SubCutaneous three times a day before meals  insulin lispro (ADMELOG) corrective regimen sliding scale   SubCutaneous at bedtime  levothyroxine 25 MICROGram(s) Oral daily  metoprolol tartrate 25 milliGRAM(s) Oral two times a day  midodrine 20 milliGRAM(s) Oral <User Schedule>  polyethylene glycol 3350 17 Gram(s) Oral two times a day  QUEtiapine 25 milliGRAM(s) Oral at bedtime  senna Syrup 10 milliLiter(s) Oral at bedtime  trihexyphenidyl 2 milliGRAM(s) Oral three times a day    MEDICATIONS  (PRN):  acetaminophen     Tablet .. 650 milliGRAM(s) Oral every 6 hours PRN Mild Pain (1 - 3)  albuterol/ipratropium for Nebulization 3 milliLiter(s) Nebulizer every 6 hours PRN Shortness of Breath and/or Wheezing  guaiFENesin Oral Liquid (Sugar-Free) 200 milliGRAM(s) Oral every 6 hours PRN Cough  sodium chloride 0.9% lock flush 10 milliLiter(s) IV Push every 1 hour PRN Pre/post blood products, medications, blood draw, and to maintain line patency      Care Discussed with Consultants/Other Providers [x] YES  [ ] NO    HEALTH ISSUES - PROBLEM Dx:  Acute heart failure    Atrial flutter    DM2 (diabetes mellitus, type 2)    CAD (coronary artery disease)    Parkinsons disease    Acute on chronic renal failure    NSTEMI (non-ST elevation myocardial infarction)    Hypertension    Acute kidney injury superimposed on CKD    Anemia    Hypothyroidism    Delirium    Advanced care planning/counseling discussion    Palliative care status    Palliative care encounter    Pain    Stage 5 chronic kidney disease not on chronic dialysis

## 2021-11-13 NOTE — CHART NOTE - NSCHARTNOTEFT_GEN_A_CORE
called by patient's nurse and team regarding bleeding near the tunneled HD catheter site. Patient seen and examined at beside this AM. bleeding noted to be from just superior to the chest wall dermatotomy from a skin lesion, not from the IR procedure.    Dermabond and quickclot were applied to the oozing skin wound with resolution of oozing from site. A sterile dressing was applied.    -c/w local wound care    Yasir Davis MD  PGY-IV, Interventional Radiology  [facility]-p.[phone], [phone]  [facility]-p.[phone] ([phone]), [phone] called by patient's nurse and team regarding bleeding near the tunneled HD catheter site. Patient seen and examined at beside this AM. bleeding noted to be from just superior to the chest wall dermatotomy from a skin lesion, not from the IR procedure.      Dermabond and quickclot were applied to the oozing skin wound with resolution of oozing from site. A sterile dressing was applied.    -c/w local wound care.    Yasir Davis MD  PGY-IV, Interventional Radiology  [facility]-p.[phone], [phone]  [facility]-p.[phone] ([phone]), [phone]

## 2021-11-14 NOTE — PROGRESS NOTE ADULT - SUBJECTIVE AND OBJECTIVE BOX
Patient is a 71y old  Male who presents with a chief complaint of leg swelling (14 Nov 2021 08:15)      SUBJECTIVE / OVERNIGHT EVENTS:    Patient seen and examined. remains very confused. cannot provide ros.      Vital Signs Last 24 Hrs  T(C): 36.7 (14 Nov 2021 11:22), Max: 36.7 (14 Nov 2021 04:45)  T(F): 98 (14 Nov 2021 11:22), Max: 98.1 (14 Nov 2021 04:45)  HR: 94 (14 Nov 2021 11:22) (94 - 120)  BP: 139/86 (14 Nov 2021 11:22) (135/80 - 148/76)  BP(mean): --  RR: 17 (14 Nov 2021 11:22) (17 - 18)  SpO2: 98% (14 Nov 2021 11:22) (94% - 98%)  I&O's Summary    13 Nov 2021 07:01  -  14 Nov 2021 07:00  --------------------------------------------------------  IN: 1240 mL / OUT: 2400 mL / NET: -1160 mL        PE:  GENERAL: NAD, AAOx1  HEAD:  Atraumatic, Normocephalic  CHEST/LUNG: Coarse bs  HEART: Regular rate and rhythm; + murmur  ABDOMEN: Soft, Nontender, Nondistended; Bowel sounds present  EXTREMITIES:  2+ Peripheral Pulses, left AVF, mittens, no edema  SKIN: right neck CDI, permacath  NEURO: confused    LABS:                        7.5    9.47  )-----------( 357      ( 14 Nov 2021 06:31 )             25.0     11-14    136  |  96  |  39<H>  ----------------------------<  113<H>  3.9   |  24  |  4.25<H>    Ca    9.3      14 Nov 2021 06:31  Phos  4.1     11-14  Mg     2.5     11-13      PTT - ( 13 Nov 2021 09:24 )  PTT:55.5 sec  CAPILLARY BLOOD GLUCOSE      POCT Blood Glucose.: 122 mg/dL (14 Nov 2021 12:18)  POCT Blood Glucose.: 131 mg/dL (14 Nov 2021 08:04)  POCT Blood Glucose.: 125 mg/dL (13 Nov 2021 22:51)  POCT Blood Glucose.: 133 mg/dL (13 Nov 2021 16:36)            RADIOLOGY & ADDITIONAL TESTS:    Imaging Personally Reviewed:  [x] YES  [ ] NO    Consultant(s) Notes Reviewed:  [x] YES  [ ] NO    MEDICATIONS  (STANDING):  ascorbic acid 500 milliGRAM(s) Oral daily  atorvastatin 80 milliGRAM(s) Oral at bedtime  carbidopa/levodopa  25/100 2.5 Tablet(s) Oral <User Schedule>  carbidopa/levodopa  25/100 2 Tablet(s) Oral <User Schedule>  chlorhexidine 4% Liquid 1 Application(s) Topical <User Schedule>  cholecalciferol 1000 Unit(s) Oral daily  dextrose 5%. 1000 milliLiter(s) (50 mL/Hr) IV Continuous <Continuous>  dextrose 5%. 1000 milliLiter(s) (100 mL/Hr) IV Continuous <Continuous>  dextrose 50% Injectable 25 Gram(s) IV Push once  dextrose 50% Injectable 12.5 Gram(s) IV Push once  dextrose 50% Injectable 25 Gram(s) IV Push once  folic acid 1 milliGRAM(s) Oral daily  glucagon  Injectable 1 milliGRAM(s) IntraMuscular once  insulin glargine Injectable (LANTUS) 6 Unit(s) SubCutaneous at bedtime  insulin lispro (ADMELOG) corrective regimen sliding scale   SubCutaneous three times a day before meals  insulin lispro (ADMELOG) corrective regimen sliding scale   SubCutaneous at bedtime  levothyroxine 25 MICROGram(s) Oral daily  metoprolol tartrate 25 milliGRAM(s) Oral two times a day  midodrine 20 milliGRAM(s) Oral <User Schedule>  polyethylene glycol 3350 17 Gram(s) Oral two times a day  QUEtiapine 25 milliGRAM(s) Oral at bedtime  senna Syrup 10 milliLiter(s) Oral at bedtime  trihexyphenidyl 2 milliGRAM(s) Oral three times a day    MEDICATIONS  (PRN):  acetaminophen     Tablet .. 650 milliGRAM(s) Oral every 6 hours PRN Mild Pain (1 - 3)  albuterol/ipratropium for Nebulization 3 milliLiter(s) Nebulizer every 6 hours PRN Shortness of Breath and/or Wheezing  guaiFENesin Oral Liquid (Sugar-Free) 200 milliGRAM(s) Oral every 6 hours PRN Cough  sodium chloride 0.9% lock flush 10 milliLiter(s) IV Push every 1 hour PRN Pre/post blood products, medications, blood draw, and to maintain line patency      Care Discussed with Consultants/Other Providers [x] YES  [ ] NO    HEALTH ISSUES - PROBLEM Dx:  Acute heart failure    Atrial flutter    DM2 (diabetes mellitus, type 2)    CAD (coronary artery disease)    Parkinsons disease    Acute on chronic renal failure    NSTEMI (non-ST elevation myocardial infarction)    Hypertension    Acute kidney injury superimposed on CKD    Anemia    Hypothyroidism    Delirium    Advanced care planning/counseling discussion    Palliative care status    Palliative care encounter    Pain    Stage 5 chronic kidney disease not on chronic dialysis

## 2021-11-14 NOTE — PROGRESS NOTE ADULT - ASSESSMENT
Assessment  DMT2: 71y Male with DM T2 with hyperglycemia, A1C 6.4%, was on insulin at home, now on low-dose basal insulin and coverage, blood sugars are stable and trending within acceptable range, no hypoglycemic episodes, eating partial meals, NAD.  Hypothyroidism: Patient has no history thyroid disease, was not on any thyroid supplements, subclinical with low-normal FT4, lethargic, started on synthroid 25 mcg po daily, FT4 improved to 1.2.  CHF: on medications, stable, monitored.  HTN: Controlled,  on antihypertensive medications.  Parkinsons: on meds, monitored.  CKD: Monitor labs/BMP      Kelsie Reece MD  Cell: [phone]  Office: [phone]     Assessment  DMT2: 71y Male with DM T2 with hyperglycemia, A1C 6.4%, was on insulin at home, now on low-dose basal insulin and coverage, blood sugars are stable and trending within acceptable range,  no hypoglycemic episodes, eating partial meals, NAD.  Hypothyroidism: Patient has no history thyroid disease, was not on any thyroid supplements, subclinical with low-normal FT4, lethargic, started on synthroid 25 mcg po daily, FT4 improved to 1.2.  CHF: on medications, stable, monitored.  HTN: Controlled,  on antihypertensive medications.  Parkinsons: on meds, monitored.  CKD: Monitor labs/BMP      Kelsie Reece MD  Cell: [phone]  Office: [phone]

## 2021-11-14 NOTE — PROGRESS NOTE ADULT - SUBJECTIVE AND OBJECTIVE BOX
Chief complaint  Patient is a 71y old  Male who presents with a chief complaint of leg swelling (14 Nov 2021 13:23)   Review of systems  Patient in bed, looks comfortable, no hypoglycemic episodes.    Labs and Fingersticks  CAPILLARY BLOOD GLUCOSE      POCT Blood Glucose.: 122 mg/dL (14 Nov 2021 12:18)  POCT Blood Glucose.: 131 mg/dL (14 Nov 2021 08:04)  POCT Blood Glucose.: 125 mg/dL (13 Nov 2021 22:51)  POCT Blood Glucose.: 133 mg/dL (13 Nov 2021 16:36)      Anion Gap, Serum: 16 (11-14 @ 06:31)  Anion Gap, Serum: 17 (11-13 @ 06:52)      Calcium, Total Serum: 9.3 (11-14 @ 06:31)  Calcium, Total Serum: 9.3 (11-13 @ 06:52)          11-14    136  |  96  |  39<H>  ----------------------------<  113<H>  3.9   |  24  |  4.25<H>    Ca    9.3      14 Nov 2021 06:31  Phos  4.1     11-14  Mg     2.5     11-13                          7.5    9.47  )-----------( 357      ( 14 Nov 2021 06:31 )             25.0     Medications  MEDICATIONS  (STANDING):  ascorbic acid 500 milliGRAM(s) Oral daily  atorvastatin 80 milliGRAM(s) Oral at bedtime  carbidopa/levodopa  25/100 2.5 Tablet(s) Oral <User Schedule>  carbidopa/levodopa  25/100 2 Tablet(s) Oral <User Schedule>  chlorhexidine 4% Liquid 1 Application(s) Topical <User Schedule>  cholecalciferol 1000 Unit(s) Oral daily  dextrose 5%. 1000 milliLiter(s) (50 mL/Hr) IV Continuous <Continuous>  dextrose 5%. 1000 milliLiter(s) (100 mL/Hr) IV Continuous <Continuous>  dextrose 50% Injectable 25 Gram(s) IV Push once  dextrose 50% Injectable 12.5 Gram(s) IV Push once  dextrose 50% Injectable 25 Gram(s) IV Push once  folic acid 1 milliGRAM(s) Oral daily  glucagon  Injectable 1 milliGRAM(s) IntraMuscular once  insulin glargine Injectable (LANTUS) 6 Unit(s) SubCutaneous at bedtime  insulin lispro (ADMELOG) corrective regimen sliding scale   SubCutaneous three times a day before meals  insulin lispro (ADMELOG) corrective regimen sliding scale   SubCutaneous at bedtime  levothyroxine 25 MICROGram(s) Oral daily  metoprolol tartrate 25 milliGRAM(s) Oral two times a day  midodrine 20 milliGRAM(s) Oral <User Schedule>  polyethylene glycol 3350 17 Gram(s) Oral two times a day  QUEtiapine 25 milliGRAM(s) Oral at bedtime  senna Syrup 10 milliLiter(s) Oral at bedtime  trihexyphenidyl 2 milliGRAM(s) Oral three times a day      Physical Exam  General: Patient comfortable in bed  Vital Signs Last 12 Hrs  T(F): 98 (11-14-21 @ 11:22), Max: 98.1 (11-14-21 @ 04:45)  HR: 94 (11-14-21 @ 11:22) (94 - 120)  BP: 139/86 (11-14-21 @ 11:22) (139/86 - 148/76)  BP(mean): --  RR: 17 (11-14-21 @ 11:22) (17 - 18)  SpO2: 98% (11-14-21 @ 11:22) (94% - 98%)  Neck: No palpable thyroid nodules.  CVS: S1S2, No murmurs  Respiratory: No wheezing, no crepitations  GI: Abdomen soft, bowel sounds positive  Musculoskeletal:  edema lower extremities.   Skin: No skin rashes, no ecchymosis    Diagnostics    Free Thyroxine, Serum: AM Sched. Collection: 06-Nov-2021 06:00 (11-05 @ 13:18)  Free Thyroxine, Serum: AM Sched. Collection: 26-Oct-2021 06:00 (10-25 @ 11:45)  Thyroid Stimulating Hormone, Serum: AM Sched. Collection: 26-Oct-2021 06:00 (10-25 @ 11:45)  Cortisol AM, Serum: 08:00 (10-22 @ 12:47)  Free Thyroxine, Serum: AM Sched. Collection: 23-Oct-2021 06:00 (10-22 @ 12:43)  Thyroid Stimulating Hormone, Serum: AM Sched. Collection: 23-Oct-2021 06:00 (10-22 @ 12:43)             Chief complaint  Patient is a 71y old  Male who presents with a chief  complaint of leg swelling (14 Nov 2021 13:23)   Review of systems  Patient in bed, looks comfortable, no hypoglycemic episodes.    Labs and Fingersticks  CAPILLARY BLOOD GLUCOSE      POCT Blood Glucose.: 122 mg/dL (14 Nov 2021 12:18)  POCT Blood Glucose.: 131 mg/dL (14 Nov 2021 08:04)  POCT Blood Glucose.: 125 mg/dL (13 Nov 2021 22:51)  POCT Blood Glucose.: 133 mg/dL (13 Nov 2021 16:36)      Anion Gap, Serum: 16 (11-14 @ 06:31)  Anion Gap, Serum: 17 (11-13 @ 06:52)      Calcium, Total Serum: 9.3 (11-14 @ 06:31)  Calcium, Total Serum: 9.3 (11-13 @ 06:52)          11-14    136  |  96  |  39<H>  ----------------------------<  113<H>  3.9   |  24  |  4.25<H>    Ca    9.3      14 Nov 2021 06:31  Phos  4.1     11-14  Mg     2.5     11-13                          7.5    9.47  )-----------( 357      ( 14 Nov 2021 06:31 )             25.0     Medications  MEDICATIONS  (STANDING):  ascorbic acid 500 milliGRAM(s) Oral daily  atorvastatin 80 milliGRAM(s) Oral at bedtime  carbidopa/levodopa  25/100 2.5 Tablet(s) Oral <User Schedule>  carbidopa/levodopa  25/100 2 Tablet(s) Oral <User Schedule>  chlorhexidine 4% Liquid 1 Application(s) Topical <User Schedule>  cholecalciferol 1000 Unit(s) Oral daily  dextrose 5%. 1000 milliLiter(s) (50 mL/Hr) IV Continuous <Continuous>  dextrose 5%. 1000 milliLiter(s) (100 mL/Hr) IV Continuous <Continuous>  dextrose 50% Injectable 25 Gram(s) IV Push once  dextrose 50% Injectable 12.5 Gram(s) IV Push once  dextrose 50% Injectable 25 Gram(s) IV Push once  folic acid 1 milliGRAM(s) Oral daily  glucagon  Injectable 1 milliGRAM(s) IntraMuscular once  insulin glargine Injectable (LANTUS) 6 Unit(s) SubCutaneous at bedtime  insulin lispro (ADMELOG) corrective regimen sliding scale   SubCutaneous three times a day before meals  insulin lispro (ADMELOG) corrective regimen sliding scale   SubCutaneous at bedtime  levothyroxine 25 MICROGram(s) Oral daily  metoprolol tartrate 25 milliGRAM(s) Oral two times a day  midodrine 20 milliGRAM(s) Oral <User Schedule>  polyethylene glycol 3350 17 Gram(s) Oral two times a day  QUEtiapine 25 milliGRAM(s) Oral at bedtime  senna Syrup 10 milliLiter(s) Oral at bedtime  trihexyphenidyl 2 milliGRAM(s) Oral three times a day      Physical Exam  General: Patient comfortable in bed  Vital Signs Last 12 Hrs  T(F): 98 (11-14-21 @ 11:22), Max: 98.1 (11-14-21 @ 04:45)  HR: 94 (11-14-21 @ 11:22) (94 - 120)  BP: 139/86 (11-14-21 @ 11:22) (139/86 - 148/76)  BP(mean): --  RR: 17 (11-14-21 @ 11:22) (17 - 18)  SpO2: 98% (11-14-21 @ 11:22) (94% - 98%)  Neck: No palpable thyroid nodules.  CVS: S1S2, No murmurs  Respiratory: No wheezing, no crepitations  GI: Abdomen soft, bowel sounds positive  Musculoskeletal:  edema lower extremities.   Skin: No skin rashes, no ecchymosis    Diagnostics    Free Thyroxine, Serum: AM Sched. Collection: 06-Nov-2021 06:00 (11-05 @ 13:18)  Free Thyroxine, Serum: AM Sched. Collection: 26-Oct-2021 06:00 (10-25 @ 11:45)  Thyroid Stimulating Hormone, Serum: AM Sched. Collection: 26-Oct-2021 06:00 (10-25 @ 11:45)  Cortisol AM, Serum: 08:00 (10-22 @ 12:47)  Free Thyroxine, Serum: AM Sched. Collection: 23-Oct-2021 06:00 (10-22 @ 12:43)  Thyroid Stimulating Hormone, Serum: AM Sched. Collection: 23-Oct-2021 06:00 (10-22 @ 12:43)

## 2021-11-14 NOTE — PROGRESS NOTE ADULT - ASSESSMENT
72yo M w/ PMHx of CAD (s/p CABG 2019), CKD (unknown stage), DM2, Parkinson's Disease, HTN, depression presents with new onset acute heart failure exacerbation, NSTEMI, started on hep gtt, developed bl RP bleed, acute anemia. sp MICU course for hemorrhagic shock, 10/28 sp IR embolization l4 and l5 lumbar artery. for permacath and AVF.    # Acute systolic and diastolic heart failure, improved  # NSTEMI- conservative mgmt dt renal function and anemia-bld loss  # ESRD, new HD  # Atrial flutter  # acute hypoxic resp failure, improved  # hemorrhagic shock, left RP hematoma, acute blood loss anemia  # lupus anticoagulant  Echo TTE mod to severe LV SD, hypokinesis anterior wall, not candidate for cath at this point  HD per renal  10/28 sp IR embolization l4 and l5 lumbar artery  on midodrine during dialysis  WBC stable, improved overall  monitor h/h, transfuse <7  sp permacath placement  sp L AVF  sp 1unit FFP, no signs of further blding    # DM2 (diabetes mellitus, type 2)  per endo  hba1c 6.4    # CAD (coronary artery disease)  # HTN  cont lopressor  c/w atorvastatin  asa on hold    # Parkinsons disease   # delirium  still confused, evaluated by neuro  carbidopa/levodopa   c/w trihexyphenidyl  seroquel    PCP Dr. iSmons    dispo: PT, monitor bleeding    please call [facility] @ [phone] for questions or concerns

## 2021-11-14 NOTE — PROGRESS NOTE ADULT - ASSESSMENT
s/p HD yesterday with 1600net fluid removed  s/p left upper arm AVF 11/12 with intact staples  denies complaints  on room air  afebrile  Next HD Tuesday    acetaminophen     Tablet .. 650 milliGRAM(s) Oral every 6 hours PRN  albuterol/ipratropium for Nebulization 3 milliLiter(s) Nebulizer every 6 hours PRN  ascorbic acid 500 milliGRAM(s) Oral daily  atorvastatin 80 milliGRAM(s) Oral at bedtime  carbidopa/levodopa  25/100 2.5 Tablet(s) Oral <User Schedule>  carbidopa/levodopa  25/100 2 Tablet(s) Oral <User Schedule>  chlorhexidine 4% Liquid 1 Application(s) Topical <User Schedule>  cholecalciferol 1000 Unit(s) Oral daily  dextrose 5%. 1000 milliLiter(s) IV Continuous <Continuous>  dextrose 5%. 1000 milliLiter(s) IV Continuous <Continuous>  dextrose 50% Injectable 25 Gram(s) IV Push once  dextrose 50% Injectable 12.5 Gram(s) IV Push once  dextrose 50% Injectable 25 Gram(s) IV Push once  folic acid 1 milliGRAM(s) Oral daily  glucagon  Injectable 1 milliGRAM(s) IntraMuscular once  guaiFENesin Oral Liquid (Sugar-Free) 200 milliGRAM(s) Oral every 6 hours PRN  insulin glargine Injectable (LANTUS) 6 Unit(s) SubCutaneous at bedtime  insulin lispro (ADMELOG) corrective regimen sliding scale   SubCutaneous three times a day before meals  insulin lispro (ADMELOG) corrective regimen sliding scale   SubCutaneous at bedtime  levothyroxine 25 MICROGram(s) Oral daily  metoprolol tartrate 25 milliGRAM(s) Oral two times a day  midodrine 20 milliGRAM(s) Oral <User Schedule>  polyethylene glycol 3350 17 Gram(s) Oral two times a day  QUEtiapine 25 milliGRAM(s) Oral at bedtime  senna Syrup 10 milliLiter(s) Oral at bedtime  sodium chloride 0.9% lock flush 10 milliLiter(s) IV Push every 1 hour PRN  trihexyphenidyl 2 milliGRAM(s) Oral three times a day      VITAL:  T(C): , Max: 36.7 (11-14-21 @ 04:45)  T(F): , Max: 98.1 (11-14-21 @ 04:45)  HR: 94 (11-14-21 @ 11:22)  BP: 139/86 (11-14-21 @ 11:22)  BP(mean): --  RR: 17 (11-14-21 @ 11:22)  SpO2: 98% (11-14-21 @ 11:22)  Wt(kg): --    11-13-21 @ 07:01  -  11-14-21 @ 07:00  --------------------------------------------------------  IN: 1240 mL / OUT: 2400 mL / NET: -1160 mL    11-14-21 @ 07:01  -  11-14-21 @ 16:05  --------------------------------------------------------  IN: 175 mL / OUT: 0 mL / NET: 175 mL        PHYSICAL EXAM:  Constitutional: NAD  Neck:  No JVD  Respiratory: CTAB/L  Cardiovascular: S1 and S2  Gastrointestinal: BS+, soft, NT/ND  Extremities: tr edema   Neurological: A/O x 3, no focal deficits  Psychiatric: Normal mood, normal affect  : No Javier  Skin: No rashes  Access: RIJ catheter perm , s/p left upper arm AVF  with intact staples    LABS:                          7.5    9.47  )-----------( 357      ( 14 Nov 2021 06:31 )             25.0     Na(136)/K(3.9)/Cl(96)/HCO3(24)/BUN(39)/Cr(4.25)Glu(113)/Ca(9.3)/Mg(--)/PO4(4.1)    11-14 @ 06:31  Na(135)/K(4.8)/Cl(96)/HCO3(22)/BUN(65)/Cr(6.02)Glu(138)/Ca(9.3)/Mg(2.5)/PO4(6.7)    11-13 @ 06:52  Na(138)/K(4.1)/Cl(98)/HCO3(23)/BUN(44)/Cr(4.64)Glu(110)/Ca(9.1)/Mg(2.2)/PO4(4.4)    11-12 @ 04:50            ASSESSMENT/PLAN  Assessment:  70yo M w/ PMHx of CAD (s/p CABG 2019), CKD (unknown stage), DM2, Parkinson's Disease, HTN, depression presents with bilateral leg swelling  ESRD   Severe LV dysfunction ;  A flutter   Confusion - alert at present     1 IR-  s/p left upper arm AVF  with intact staples    2 Renal- s/p  HD yesterday. Subsequent HD Tuesday    3 CVS- BP controlled at preesent, on Midodrine (with hold parameters in place, SBP> 160),  Lopressor for heart rate control     4 Anemia - hgb stable;  Retacrit at HD     5 Vasc - s/p  LUE AVF  with intact staples    6 Lytes controlled    Get Delcid, NP-BC  [facility]  [phone]

## 2021-11-14 NOTE — PROGRESS NOTE ADULT - SUBJECTIVE AND OBJECTIVE BOX
Vascular Surgery    SUBJECTIVE: Pt seen and examined on rounds with team. No acute events overnight.        OBJECTIVE    PHYSICAL EXAM:  Constitutional: A&Ox3, NAD  Respiratory: Unlabored breathing  Extremities: L AVF incision C/D/I with thrill intact. Palpable radial/ulnar pulse, No gross motor/sensory deficit.     VITALS  T(C): 36.7 (11-14-21 @ 04:45), Max: 36.7 (11-14-21 @ 04:45)  HR: 120 (11-14-21 @ 04:45) (103 - 120)  BP: 148/76 (11-14-21 @ 04:45) (131/72 - 148/76)  RR: 18 (11-14-21 @ 04:45) (18 - 18)  SpO2: 94% (11-14-21 @ 04:45) (94% - 97%)  CAPILLARY BLOOD GLUCOSE      POCT Blood Glucose.: 131 mg/dL (14 Nov 2021 08:04)  POCT Blood Glucose.: 125 mg/dL (13 Nov 2021 22:51)  POCT Blood Glucose.: 133 mg/dL (13 Nov 2021 16:36)  POCT Blood Glucose.: 145 mg/dL (13 Nov 2021 12:38)      Is/Os    11-13 @ 07:01  -  11-14 @ 07:00  --------------------------------------------------------  IN:    Oral Fluid: 440 mL    Other (mL): 800 mL  Total IN: 1240 mL    OUT:    Other (mL): 2400 mL  Total OUT: 2400 mL    Total NET: -1160 mL          MEDICATIONS (STANDING): ascorbic acid 500 milliGRAM(s) Oral daily  atorvastatin 80 milliGRAM(s) Oral at bedtime  carbidopa/levodopa  25/100 2.5 Tablet(s) Oral <User Schedule>  carbidopa/levodopa  25/100 2 Tablet(s) Oral <User Schedule>  cholecalciferol 1000 Unit(s) Oral daily  dextrose 5%. 1000 milliLiter(s) IV Continuous <Continuous>  dextrose 5%. 1000 milliLiter(s) IV Continuous <Continuous>  dextrose 50% Injectable 25 Gram(s) IV Push once  dextrose 50% Injectable 12.5 Gram(s) IV Push once  dextrose 50% Injectable 25 Gram(s) IV Push once  folic acid 1 milliGRAM(s) Oral daily  glucagon  Injectable 1 milliGRAM(s) IntraMuscular once  insulin glargine Injectable (LANTUS) 6 Unit(s) SubCutaneous at bedtime  insulin lispro (ADMELOG) corrective regimen sliding scale   SubCutaneous three times a day before meals  insulin lispro (ADMELOG) corrective regimen sliding scale   SubCutaneous at bedtime  levothyroxine 25 MICROGram(s) Oral daily  metoprolol tartrate 25 milliGRAM(s) Oral two times a day  midodrine 20 milliGRAM(s) Oral <User Schedule>  polyethylene glycol 3350 17 Gram(s) Oral two times a day  QUEtiapine 25 milliGRAM(s) Oral at bedtime  senna Syrup 10 milliLiter(s) Oral at bedtime  trihexyphenidyl 2 milliGRAM(s) Oral three times a day    MEDICATIONS (PRN):acetaminophen     Tablet .. 650 milliGRAM(s) Oral every 6 hours PRN Mild Pain (1 - 3)  albuterol/ipratropium for Nebulization 3 milliLiter(s) Nebulizer every 6 hours PRN Shortness of Breath and/or Wheezing  guaiFENesin Oral Liquid (Sugar-Free) 200 milliGRAM(s) Oral every 6 hours PRN Cough  sodium chloride 0.9% lock flush 10 milliLiter(s) IV Push every 1 hour PRN Pre/post blood products, medications, blood draw, and to maintain line patency      LABS  CBC (11-14 @ 06:31)                              7.5<L>                         9.47    )----------------(  357        --    % Neutrophils, --    % Lymphocytes, ANC: --                                  25.0<L>  CBC (11-13 @ 17:32)                              7.5<L>                         11.79<H>  )----------------(  376        --    % Neutrophils, --    % Lymphocytes, ANC: --                                  24.9<L>    BMP (11-14 @ 06:31)             136     |  96      |  39<H> 		Ca++ --      Ca 9.3                ---------------------------------( 113<H>		Mg --                 3.9     |  24      |  4.25<H>			Ph 4.1     BMP (11-13 @ 06:52)             135     |  96      |  65<H> 		Ca++ --      Ca 9.3                ---------------------------------( 138<H>		Mg 2.5                4.8     |  22      |  6.02<H>			Ph 6.7<H>      [place] (11-13 @ 09:24)  aPTT 55.5<H> / INR -- / PT --            IMAGING STUDIES

## 2021-11-15 NOTE — PROGRESS NOTE ADULT - SUBJECTIVE AND OBJECTIVE BOX
Patient is a 71y old  Male who presents with a chief complaint of leg swelling (15 Nov 2021 11:37)      SUBJECTIVE / OVERNIGHT EVENTS:    Patient seen and examined. pt states he is in the lobby of the [facility] hotel. states he wants to rest.      Vital Signs Last 24 Hrs  T(C): 36.6 (15 Nov 2021 10:37), Max: 37.1 (15 Nov 2021 03:57)  T(F): 97.8 (15 Nov 2021 10:37), Max: 98.7 (15 Nov 2021 03:57)  HR: 93 (15 Nov 2021 10:37) (93 - 112)  BP: 134/82 (15 Nov 2021 10:37) (130/79 - 138/80)  BP(mean): --  RR: 18 (15 Nov 2021 10:37) (17 - 18)  SpO2: 96% (15 Nov 2021 10:37) (96% - 98%)  I&O's Summary    14 Nov 2021 07:01  -  15 Nov 2021 07:00  --------------------------------------------------------  IN: 775 mL / OUT: 0 mL / NET: 775 mL    15 Nov 2021 07:01  -  15 Nov 2021 12:59  --------------------------------------------------------  IN: 120 mL / OUT: 0 mL / NET: 120 mL        PE:  GENERAL: NAD, AAOx3  HEAD:  Atraumatic, Normocephalic  EYES: EOMI, PERRLA, conjunctiva and sclera clear  NECK: Supple, No JVD  CHEST/LUNG: CTABL, No wheeze  HEART: Regular rate and rhythm; + murmur  ABDOMEN: Soft, Nontender, Nondistended; Bowel sounds present  EXTREMITIES:  2+ Peripheral Pulses, No clubbing, cyanosis, or edema  SKIN: No rashes or lesions  NEURO: No focal deficits    LABS:                        7.4    7.88  )-----------( 360      ( 15 Nov 2021 07:33 )             24.4     11-15    138  |  96  |  53<H>  ----------------------------<  117<H>  4.3   |  24  |  5.50<H>    Ca    9.2      15 Nov 2021 07:28  Phos  4.1     11-14        CAPILLARY BLOOD GLUCOSE      POCT Blood Glucose.: 167 mg/dL (15 Nov 2021 11:54)  POCT Blood Glucose.: 134 mg/dL (15 Nov 2021 07:57)  POCT Blood Glucose.: 146 mg/dL (14 Nov 2021 21:37)  POCT Blood Glucose.: 137 mg/dL (14 Nov 2021 16:52)            RADIOLOGY & ADDITIONAL TESTS:    Imaging Personally Reviewed:  [x] YES  [ ] NO    Consultant(s) Notes Reviewed:  [x] YES  [ ] NO    MEDICATIONS  (STANDING):  ascorbic acid 500 milliGRAM(s) Oral daily  atorvastatin 80 milliGRAM(s) Oral at bedtime  carbidopa/levodopa  25/100 2.5 Tablet(s) Oral <User Schedule>  carbidopa/levodopa  25/100 2 Tablet(s) Oral <User Schedule>  chlorhexidine 4% Liquid 1 Application(s) Topical <User Schedule>  cholecalciferol 1000 Unit(s) Oral daily  dextrose 5%. 1000 milliLiter(s) (50 mL/Hr) IV Continuous <Continuous>  dextrose 5%. 1000 milliLiter(s) (100 mL/Hr) IV Continuous <Continuous>  dextrose 50% Injectable 25 Gram(s) IV Push once  dextrose 50% Injectable 25 Gram(s) IV Push once  dextrose 50% Injectable 12.5 Gram(s) IV Push once  folic acid 1 milliGRAM(s) Oral daily  glucagon  Injectable 1 milliGRAM(s) IntraMuscular once  insulin glargine Injectable (LANTUS) 6 Unit(s) SubCutaneous at bedtime  insulin lispro (ADMELOG) corrective regimen sliding scale   SubCutaneous three times a day before meals  insulin lispro (ADMELOG) corrective regimen sliding scale   SubCutaneous at bedtime  levothyroxine 25 MICROGram(s) Oral daily  memantine 5 milliGRAM(s) Oral two times a day  metoprolol tartrate 25 milliGRAM(s) Oral two times a day  midodrine 20 milliGRAM(s) Oral <User Schedule>  polyethylene glycol 3350 17 Gram(s) Oral two times a day  QUEtiapine 25 milliGRAM(s) Oral at bedtime  senna Syrup 10 milliLiter(s) Oral at bedtime  thiamine IVPB 500 milliGRAM(s) IV Intermittent three times a day  trihexyphenidyl 2 milliGRAM(s) Oral three times a day    MEDICATIONS  (PRN):  acetaminophen     Tablet .. 650 milliGRAM(s) Oral every 6 hours PRN Mild Pain (1 - 3)  albuterol/ipratropium for Nebulization 3 milliLiter(s) Nebulizer every 6 hours PRN Shortness of Breath and/or Wheezing  guaiFENesin Oral Liquid (Sugar-Free) 200 milliGRAM(s) Oral every 6 hours PRN Cough  sodium chloride 0.9% lock flush 10 milliLiter(s) IV Push every 1 hour PRN Pre/post blood products, medications, blood draw, and to maintain line patency      Care Discussed with Consultants/Other Providers [x] YES  [ ] NO    HEALTH ISSUES - PROBLEM Dx:  Acute heart failure    Atrial flutter    DM2 (diabetes mellitus, type 2)    CAD (coronary artery disease)    Parkinsons disease    Acute on chronic renal failure    NSTEMI (non-ST elevation myocardial infarction)    Hypertension    Acute kidney injury superimposed on CKD    Anemia    Hypothyroidism    Delirium    Advanced care planning/counseling discussion    Palliative care status    Palliative care encounter    Pain    Stage 5 chronic kidney disease not on chronic dialysis         Patient is a 71y old  Male who presents with a chief complaint of leg swelling (15 Nov 2021 11:37)      SUBJECTIVE / OVERNIGHT EVENTS:    Patient seen and examined. pt states he is in the lobby of the [facility] hotel. states he wants to rest.      Vital Signs Last 24 Hrs  T(C): 36.6 (15 Nov 2021 10:37), Max: 37.1 (15 Nov 2021 03:57)  T(F): 97.8 (15 Nov 2021 10:37), Max: 98.7 (15 Nov 2021 03:57)  HR: 93 (15 Nov 2021 10:37) (93 - 112)  BP: 134/82 (15 Nov 2021 10:37) (130/79 - 138/80)  BP(mean): --  RR: 18 (15 Nov 2021 10:37) (17 - 18)  SpO2: 96% (15 Nov 2021 10:37) (96% - 98%)  I&O's Summary    14 Nov 2021 07:01  -  15 Nov 2021 07:00  --------------------------------------------------------  IN: 775 mL / OUT: 0 mL / NET: 775 mL    15 Nov 2021 07:01  -  15 Nov 2021 12:59  --------------------------------------------------------  IN: 120 mL / OUT: 0 mL / NET: 120 mL        PE:  GENERAL: AAOx0  HEAD:  Atraumatic, Normocephalic  CHEST/LUNG: Coarse bs  HEART: Regular rate and rhythm; + murmur  ABDOMEN: Soft, Nontender, Nondistended; Bowel sounds present  EXTREMITIES:  2+ Peripheral Pulses, left AVF, mittens, no edema  SKIN: right neck CDI, permacath  NEURO: confused    LABS:                        7.4    7.88  )-----------( 360      ( 15 Nov 2021 07:33 )             24.4     11-15    138  |  96  |  53<H>  ----------------------------<  117<H>  4.3   |  24  |  5.50<H>    Ca    9.2      15 Nov 2021 07:28  Phos  4.1     11-14        CAPILLARY BLOOD GLUCOSE      POCT Blood Glucose.: 167 mg/dL (15 Nov 2021 11:54)  POCT Blood Glucose.: 134 mg/dL (15 Nov 2021 07:57)  POCT Blood Glucose.: 146 mg/dL (14 Nov 2021 21:37)  POCT Blood Glucose.: 137 mg/dL (14 Nov 2021 16:52)            RADIOLOGY & ADDITIONAL TESTS:    Imaging Personally Reviewed:  [x] YES  [ ] NO    Consultant(s) Notes Reviewed:  [x] YES  [ ] NO    MEDICATIONS  (STANDING):  ascorbic acid 500 milliGRAM(s) Oral daily  atorvastatin 80 milliGRAM(s) Oral at bedtime  carbidopa/levodopa  25/100 2.5 Tablet(s) Oral <User Schedule>  carbidopa/levodopa  25/100 2 Tablet(s) Oral <User Schedule>  chlorhexidine 4% Liquid 1 Application(s) Topical <User Schedule>  cholecalciferol 1000 Unit(s) Oral daily  dextrose 5%. 1000 milliLiter(s) (50 mL/Hr) IV Continuous <Continuous>  dextrose 5%. 1000 milliLiter(s) (100 mL/Hr) IV Continuous <Continuous>  dextrose 50% Injectable 25 Gram(s) IV Push once  dextrose 50% Injectable 25 Gram(s) IV Push once  dextrose 50% Injectable 12.5 Gram(s) IV Push once  folic acid 1 milliGRAM(s) Oral daily  glucagon  Injectable 1 milliGRAM(s) IntraMuscular once  insulin glargine Injectable (LANTUS) 6 Unit(s) SubCutaneous at bedtime  insulin lispro (ADMELOG) corrective regimen sliding scale   SubCutaneous three times a day before meals  insulin lispro (ADMELOG) corrective regimen sliding scale   SubCutaneous at bedtime  levothyroxine 25 MICROGram(s) Oral daily  memantine 5 milliGRAM(s) Oral two times a day  metoprolol tartrate 25 milliGRAM(s) Oral two times a day  midodrine 20 milliGRAM(s) Oral <User Schedule>  polyethylene glycol 3350 17 Gram(s) Oral two times a day  QUEtiapine 25 milliGRAM(s) Oral at bedtime  senna Syrup 10 milliLiter(s) Oral at bedtime  thiamine IVPB 500 milliGRAM(s) IV Intermittent three times a day  trihexyphenidyl 2 milliGRAM(s) Oral three times a day    MEDICATIONS  (PRN):  acetaminophen     Tablet .. 650 milliGRAM(s) Oral every 6 hours PRN Mild Pain (1 - 3)  albuterol/ipratropium for Nebulization 3 milliLiter(s) Nebulizer every 6 hours PRN Shortness of Breath and/or Wheezing  guaiFENesin Oral Liquid (Sugar-Free) 200 milliGRAM(s) Oral every 6 hours PRN Cough  sodium chloride 0.9% lock flush 10 milliLiter(s) IV Push every 1 hour PRN Pre/post blood products, medications, blood draw, and to maintain line patency      Care Discussed with Consultants/Other Providers [x] YES  [ ] NO    HEALTH ISSUES - PROBLEM Dx:  Acute heart failure    Atrial flutter    DM2 (diabetes mellitus, type 2)    CAD (coronary artery disease)    Parkinsons disease    Acute on chronic renal failure    NSTEMI (non-ST elevation myocardial infarction)    Hypertension    Acute kidney injury superimposed on CKD    Anemia    Hypothyroidism    Delirium    Advanced care planning/counseling discussion    Palliative care status    Palliative care encounter    Pain    Stage 5 chronic kidney disease not on chronic dialysis         Patient is a 71y old  Male who presents with a chief complaint of leg swelling (15 Nov 2021 11:37)      SUBJECTIVE / OVERNIGHT EVENTS:    Patient seen and examined. pt states he is in the lobby of the [facility] hotel. states he wants to rest.      Vital Signs Last 24 Hrs  T(C): 36.6 (15 Nov 2021 10:37), Max: 37.1 (15 Nov 2021 03:57)  T(F): 97.8 (15 Nov 2021 10:37), Max: 98.7 (15 Nov 2021 03:57)  HR: 93 (15 Nov 2021 10:37) (93 - 112)  BP: 134/82 (15 Nov 2021 10:37) (130/79 - 138/80)  BP(mean): --  RR: 18 (15 Nov 2021 10:37) (17 - 18)  SpO2: 96% (15 Nov 2021 10:37) (96% - 98%)  I&O's Summary    14 Nov 2021 07:01  -  15 Nov 2021 07:00  --------------------------------------------------------  IN: 775 mL / OUT: 0 mL / NET: 775 mL    15 Nov 2021 07:01  -  15 Nov 2021 12:59  --------------------------------------------------------  IN: 120 mL / OUT: 0 mL / NET: 120 mL        PE:  GENERAL: AAOx0  HEAD:  Atraumatic, Normocephalic  CHEST/LUNG: Coarse bs  HEART: Regular rate and rhythm; + murmur  ABDOMEN: Soft, Nontender, Nondistended; Bowel sounds present  EXTREMITIES:  2+ Peripheral Pulses, LUE AVF STAPLES, mittens, LUE edema  SKIN: right neck CDI, permacath  NEURO: confused    LABS:                        7.4    7.88  )-----------( 360      ( 15 Nov 2021 07:33 )             24.4     11-15    138  |  96  |  53<H>  ----------------------------<  117<H>  4.3   |  24  |  5.50<H>    Ca    9.2      15 Nov 2021 07:28  Phos  4.1     11-14        CAPILLARY BLOOD GLUCOSE      POCT Blood Glucose.: 167 mg/dL (15 Nov 2021 11:54)  POCT Blood Glucose.: 134 mg/dL (15 Nov 2021 07:57)  POCT Blood Glucose.: 146 mg/dL (14 Nov 2021 21:37)  POCT Blood Glucose.: 137 mg/dL (14 Nov 2021 16:52)            RADIOLOGY & ADDITIONAL TESTS:    Imaging Personally Reviewed:  [x] YES  [ ] NO    Consultant(s) Notes Reviewed:  [x] YES  [ ] NO    MEDICATIONS  (STANDING):  ascorbic acid 500 milliGRAM(s) Oral daily  atorvastatin 80 milliGRAM(s) Oral at bedtime  carbidopa/levodopa  25/100 2.5 Tablet(s) Oral <User Schedule>  carbidopa/levodopa  25/100 2 Tablet(s) Oral <User Schedule>  chlorhexidine 4% Liquid 1 Application(s) Topical <User Schedule>  cholecalciferol 1000 Unit(s) Oral daily  dextrose 5%. 1000 milliLiter(s) (50 mL/Hr) IV Continuous <Continuous>  dextrose 5%. 1000 milliLiter(s) (100 mL/Hr) IV Continuous <Continuous>  dextrose 50% Injectable 25 Gram(s) IV Push once  dextrose 50% Injectable 25 Gram(s) IV Push once  dextrose 50% Injectable 12.5 Gram(s) IV Push once  folic acid 1 milliGRAM(s) Oral daily  glucagon  Injectable 1 milliGRAM(s) IntraMuscular once  insulin glargine Injectable (LANTUS) 6 Unit(s) SubCutaneous at bedtime  insulin lispro (ADMELOG) corrective regimen sliding scale   SubCutaneous three times a day before meals  insulin lispro (ADMELOG) corrective regimen sliding scale   SubCutaneous at bedtime  levothyroxine 25 MICROGram(s) Oral daily  memantine 5 milliGRAM(s) Oral two times a day  metoprolol tartrate 25 milliGRAM(s) Oral two times a day  midodrine 20 milliGRAM(s) Oral <User Schedule>  polyethylene glycol 3350 17 Gram(s) Oral two times a day  QUEtiapine 25 milliGRAM(s) Oral at bedtime  senna Syrup 10 milliLiter(s) Oral at bedtime  thiamine IVPB 500 milliGRAM(s) IV Intermittent three times a day  trihexyphenidyl 2 milliGRAM(s) Oral three times a day    MEDICATIONS  (PRN):  acetaminophen     Tablet .. 650 milliGRAM(s) Oral every 6 hours PRN Mild Pain (1 - 3)  albuterol/ipratropium for Nebulization 3 milliLiter(s) Nebulizer every 6 hours PRN Shortness of Breath and/or Wheezing  guaiFENesin Oral Liquid (Sugar-Free) 200 milliGRAM(s) Oral every 6 hours PRN Cough  sodium chloride 0.9% lock flush 10 milliLiter(s) IV Push every 1 hour PRN Pre/post blood products, medications, blood draw, and to maintain line patency      Care Discussed with Consultants/Other Providers [x] YES  [ ] NO    HEALTH ISSUES - PROBLEM Dx:  Acute heart failure    Atrial flutter    DM2 (diabetes mellitus, type 2)    CAD (coronary artery disease)    Parkinsons disease    Acute on chronic renal failure    NSTEMI (non-ST elevation myocardial infarction)    Hypertension    Acute kidney injury superimposed on CKD    Anemia    Hypothyroidism    Delirium    Advanced care planning/counseling discussion    Palliative care status    Palliative care encounter    Pain    Stage 5 chronic kidney disease not on chronic dialysis

## 2021-11-15 NOTE — BH CONSULTATION LIAISON ASSESSMENT NOTE - CURRENT MEDICATION
MEDICATIONS  (STANDING):  ascorbic acid 500 milliGRAM(s) Oral daily  atorvastatin 80 milliGRAM(s) Oral at bedtime  carbidopa/levodopa  25/100 2.5 Tablet(s) Oral <User Schedule>  carbidopa/levodopa  25/100 2 Tablet(s) Oral <User Schedule>  chlorhexidine 4% Liquid 1 Application(s) Topical <User Schedule>  cholecalciferol 1000 Unit(s) Oral daily  dextrose 5%. 1000 milliLiter(s) (50 mL/Hr) IV Continuous <Continuous>  dextrose 5%. 1000 milliLiter(s) (100 mL/Hr) IV Continuous <Continuous>  dextrose 50% Injectable 25 Gram(s) IV Push once  dextrose 50% Injectable 12.5 Gram(s) IV Push once  dextrose 50% Injectable 25 Gram(s) IV Push once  folic acid 1 milliGRAM(s) Oral daily  glucagon  Injectable 1 milliGRAM(s) IntraMuscular once  insulin glargine Injectable (LANTUS) 6 Unit(s) SubCutaneous at bedtime  insulin lispro (ADMELOG) corrective regimen sliding scale   SubCutaneous three times a day before meals  insulin lispro (ADMELOG) corrective regimen sliding scale   SubCutaneous at bedtime  levothyroxine 25 MICROGram(s) Oral daily  memantine 5 milliGRAM(s) Oral two times a day  metoprolol tartrate 25 milliGRAM(s) Oral two times a day  midodrine 20 milliGRAM(s) Oral <User Schedule>  polyethylene glycol 3350 17 Gram(s) Oral two times a day  QUEtiapine 25 milliGRAM(s) Oral at bedtime  senna Syrup 10 milliLiter(s) Oral at bedtime  thiamine IVPB 500 milliGRAM(s) IV Intermittent three times a day  trihexyphenidyl 2 milliGRAM(s) Oral three times a day    MEDICATIONS  (PRN):  acetaminophen     Tablet .. 650 milliGRAM(s) Oral every 6 hours PRN Mild Pain (1 - 3)  albuterol/ipratropium for Nebulization 3 milliLiter(s) Nebulizer every 6 hours PRN Shortness of Breath and/or Wheezing  guaiFENesin Oral Liquid (Sugar-Free) 200 milliGRAM(s) Oral every 6 hours PRN Cough  sodium chloride 0.9% lock flush 10 milliLiter(s) IV Push every 1 hour PRN Pre/post blood products, medications, blood draw, and to maintain line patency

## 2021-11-15 NOTE — PROGRESS NOTE ADULT - ASSESSMENT
71 M w volume overload, GI consulted for drop in hgb.     1. Volume overload per cardio    2. ABBY on CKD per renal  s/p AV graft    3. Drop in hgb, no overt GI Bleed.      4. RP bleed  s/p Abdominal Angiography and Embolization on 10/29/2021 by Interventional radiology of l4and l5 lumbar artery  consider repeat CTA as h/h not improved    5. abdominal distention  -improved        [facility]  Gastroenterology and Hepatology  [address] [place], [place]  Office: [phone]  Cell: [phone]

## 2021-11-15 NOTE — PROGRESS NOTE ADULT - SUBJECTIVE AND OBJECTIVE BOX
NEPHROLOGY-[facility] [phone]        Patient seen and examined in bed.  He was the same         MEDICATIONS  (STANDING):  ascorbic acid 500 milliGRAM(s) Oral daily  atorvastatin 80 milliGRAM(s) Oral at bedtime  carbidopa/levodopa  25/100 2.5 Tablet(s) Oral <User Schedule>  carbidopa/levodopa  25/100 2 Tablet(s) Oral <User Schedule>  chlorhexidine 4% Liquid 1 Application(s) Topical <User Schedule>  cholecalciferol 1000 Unit(s) Oral daily  dextrose 5%. 1000 milliLiter(s) (50 mL/Hr) IV Continuous <Continuous>  dextrose 5%. 1000 milliLiter(s) (100 mL/Hr) IV Continuous <Continuous>  dextrose 50% Injectable 25 Gram(s) IV Push once  dextrose 50% Injectable 12.5 Gram(s) IV Push once  dextrose 50% Injectable 25 Gram(s) IV Push once  folic acid 1 milliGRAM(s) Oral daily  glucagon  Injectable 1 milliGRAM(s) IntraMuscular once  insulin glargine Injectable (LANTUS) 6 Unit(s) SubCutaneous at bedtime  insulin lispro (ADMELOG) corrective regimen sliding scale   SubCutaneous three times a day before meals  insulin lispro (ADMELOG) corrective regimen sliding scale   SubCutaneous at bedtime  levothyroxine 25 MICROGram(s) Oral daily  memantine 5 milliGRAM(s) Oral two times a day  metoprolol tartrate 25 milliGRAM(s) Oral two times a day  midodrine 20 milliGRAM(s) Oral <User Schedule>  polyethylene glycol 3350 17 Gram(s) Oral two times a day  QUEtiapine 25 milliGRAM(s) Oral at bedtime  senna Syrup 10 milliLiter(s) Oral at bedtime  thiamine IVPB 500 milliGRAM(s) IV Intermittent three times a day  trihexyphenidyl 2 milliGRAM(s) Oral three times a day      VITAL:  T(C): , Max: 37.1 (11-15-21 @ 03:57)  T(F): , Max: 98.7 (11-15-21 @ 03:57)  HR: 93 (11-15-21 @ 10:37)  BP: 134/82 (11-15-21 @ 10:37)  BP(mean): --  RR: 18 (11-15-21 @ 10:37)  SpO2: 96% (11-15-21 @ 10:37)  Wt(kg): --    I and O's:    11-14 @ 07:01  -  11-15 @ 07:00  --------------------------------------------------------  IN: 775 mL / OUT: 0 mL / NET: 775 mL    11-15 @ 07:01  -  11-15 @ 15:01  --------------------------------------------------------  IN: 120 mL / OUT: 0 mL / NET: 120 mL          PHYSICAL EXAM:    Constitutional: NAD  Neck:  No JVD  Respiratory: CTAB/L  Cardiovascular: S1 and S2  Gastrointestinal: BS+, soft, NT/ND  Extremities: No peripheral edema  Neurological: A/O x 3, no focal deficits  Psychiatric: Normal mood, normal affect  : No Javier  Skin: No rashes  Access: perm cath and avf     LABS:                        7.4    7.88  )-----------( 360      ( 15 Nov 2021 07:33 )             24.4     11-15    138  |  96  |  53<H>  ----------------------------<  117<H>  4.3   |  24  |  5.50<H>    Ca    9.2      15 Nov 2021 07:28  Phos  4.1     11-14            Urine Studies:          RADIOLOGY & ADDITIONAL STUDIES:             NEPHROLOGY-[facility] [phone]        Patient seen and examined in bed.  He was the same         MEDICATIONS  (STANDING):  ascorbic acid 500 milliGRAM(s) Oral daily  atorvastatin 80 milliGRAM(s) Oral at bedtime  carbidopa/levodopa  25/100 2.5 Tablet(s) Oral <User Schedule>  carbidopa/levodopa  25/100 2 Tablet(s) Oral <User Schedule>  chlorhexidine 4% Liquid 1 Application(s) Topical <User Schedule>  cholecalciferol 1000 Unit(s) Oral daily  dextrose 5%. 1000 milliLiter(s) (50 mL/Hr) IV Continuous <Continuous>  dextrose 5%. 1000 milliLiter(s) (100 mL/Hr) IV Continuous <Continuous>  dextrose 50% Injectable 25 Gram(s) IV Push once  dextrose 50% Injectable 12.5 Gram(s) IV Push once  dextrose 50% Injectable 25 Gram(s) IV Push once  folic acid 1 milliGRAM(s) Oral daily  glucagon  Injectable 1 milliGRAM(s) IntraMuscular once  insulin glargine Injectable (LANTUS) 6 Unit(s) SubCutaneous at bedtime  insulin lispro (ADMELOG) corrective regimen sliding scale   SubCutaneous three times a day before meals  insulin lispro (ADMELOG) corrective regimen sliding scale   SubCutaneous at bedtime  levothyroxine 25 MICROGram(s) Oral daily  memantine 5 milliGRAM(s) Oral two times a day  metoprolol tartrate 25 milliGRAM(s) Oral two times a day  midodrine 20 milliGRAM(s) Oral <User Schedule>  polyethylene glycol 3350 17 Gram(s) Oral two times a day  QUEtiapine 25 milliGRAM(s) Oral at bedtime  senna Syrup 10 milliLiter(s) Oral at bedtime  thiamine IVPB 500 milliGRAM(s) IV Intermittent three times a day  trihexyphenidyl 2 milliGRAM(s) Oral three times a day      VITAL:  T(C): , Max: 37.1 (11-15-21 @ 03:57)  T(F): , Max: 98.7 (11-15-21 @ 03:57)  HR: 93 (11-15-21 @ 10:37)  BP: 134/82 (11-15-21 @ 10:37)  BP(mean): --  RR: 18 (11-15-21 @ 10:37)  SpO2: 96% (11-15-21 @ 10:37)  Wt(kg): --    I and O's:    11-14 @ 07:01  -  11-15 @ 07:00  --------------------------------------------------------  IN: 775 mL / OUT: 0 mL / NET: 775 mL    11-15 @ 07:01  -  11-15 @ 15:01  --------------------------------------------------------  IN: 120 mL / OUT: 0 mL / NET: 120 mL          PHYSICAL EXAM:    Constitutional: NAD  Neck:  No JVD  Respiratory: CTAB/L  Cardiovascular: S1 and S2  Gastrointestinal: BS+, soft, NT/ND  Extremities: No peripheral edema  Neurological: A/O x 2 no focal deficits  Psychiatric: Normal mood, normal affect  : No Javier  Skin: No rashes  Access: perm cath and avf     LABS:                        7.4    7.88  )-----------( 360      ( 15 Nov 2021 07:33 )             24.4     11-15    138  |  96  |  53<H>  ----------------------------<  117<H>  4.3   |  24  |  5.50<H>    Ca    9.2      15 Nov 2021 07:28  Phos  4.1     11-14            Urine Studies:          RADIOLOGY & ADDITIONAL STUDIES:

## 2021-11-15 NOTE — BH CONSULTATION LIAISON ASSESSMENT NOTE - SUMMARY
Pt is a 72yo M w/ PMHx of CAD (s/p CABG 2019), CKD (unknown stage), DM2, Parkinson's Disease, HTN, depression presents with new onset acute heart failure exacerbation, NSTEMI, started on hep gtt, developed bl RP bleed, acute anemia. Pt is s/p MICU course for hemorrhagic shock, 10/28 sp IR embolization l4 and l5 lumbar artery for permacath and AVF.  Pt's son reports that patient was alert and oriented before he developed his medical complications.  He has been confabulating since the last three weeks.  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  Pt is calm now but his son reports that he had a history of anxiety in the past but never confusion or confabulation as he does now.  Discussed risks/benefits of continuing the Seroquel and the recommendation for increasing it to 25mg PO BID.

## 2021-11-15 NOTE — PROGRESS NOTE ADULT - ASSESSMENT
72yo M w/ PMHx of CAD (s/p CABG 2019), CKD (unknown stage), DM2, Parkinson's Disease, HTN, depression presents with new onset acute heart failure exacerbation, NSTEMI, started on hep gtt, developed bl RP bleed, acute anemia. sp MICU course for hemorrhagic shock, 10/28 sp IR embolization l4 and l5 lumbar artery. for permacath and AVF.    # Acute systolic and diastolic heart failure, improved  # NSTEMI- conservative mgmt dt renal function and anemia-bld loss  # ESRD, new HD  # Atrial flutter  # acute hypoxic resp failure, improved  # hemorrhagic shock, left RP hematoma, acute blood loss anemia  # lupus anticoagulant  Echo TTE mod to severe LV SD, hypokinesis anterior wall, not candidate for cath at this point  HD per renal  10/28 sp IR embolization l4 and l5 lumbar artery  on midodrine during dialysis  WBC stable, improved overall  monitor h/h, transfuse <7  sp permacath placement  sp L AVF  sp 1unit FFP, no signs of further blding    # DM2 (diabetes mellitus, type 2)  per endo  hba1c 6.4    # CAD (coronary artery disease)  # HTN  cont lopressor  c/w atorvastatin  asa on hold    # Parkinsons disease   # delirium  at baseline pt is poor historian but remains confused  dw neuro  remains on restraints as pulling at permacath  psych consult for paranoia  carbidopa/levodopa   c/w trihexyphenidyl  seroquel    PCP Dr. Simons    dispo: psych eval    please call [facility] @ [phone] for questions or concerns

## 2021-11-15 NOTE — PROGRESS NOTE ADULT - SUBJECTIVE AND OBJECTIVE BOX
Follow-up Pulm Progress Note  Brayan Verma MD  [phone]    Sleeping comforably on room air: sats in mid 90's  Hb 7.4  No new resp issues      Vital Signs Last 24 Hrs  T(C): 36.6 (15 Nov 2021 10:37), Max: 37.1 (15 Nov 2021 03:57)  T(F): 97.8 (15 Nov 2021 10:37), Max: 98.7 (15 Nov 2021 03:57)  HR: 93 (15 Nov 2021 10:37) (93 - 112)  BP: 134/82 (15 Nov 2021 10:37) (130/79 - 138/80)  BP(mean): --  RR: 18 (15 Nov 2021 10:37) (17 - 18)  SpO2: 96% (15 Nov 2021 10:37) (96% - 98%)                          7.4    7.88  )-----------( 360      ( 15 Nov 2021 07:33 )             24.4     11-15    138  |  96  |  53<H>  ----------------------------<  117<H>  4.3   |  24  |  5.50<H>    Ca    9.2      15 Nov 2021 07:28  Phos  4.1     11-14      Physical Examination:  PULM: Diminished basilar BS  CVS: Regular rate and rhythm, no murmurs, rubs, or gallops  ABD: Soft, non-tender  EXT:  No clubbing, cyanosis, or edema    RADIOLOGY REVIEWED  CXR:    CT chest:    TTE:

## 2021-11-15 NOTE — PROGRESS NOTE ADULT - ASSESSMENT
Assessment:  70yo M w/ PMHx of CAD (s/p CABG 2019), CKD (unknown stage), DM2, Parkinson's Disease, HTN, depression presents with bilateral leg swelling  ESRD   Severe LV dysfunction ;  A flutter   Confusion - alert at present     1 IR-  s/p left upper arm AVF  with intact staples    2 Renal-   Subsequent HD Tuesday    3 CVS- BP controlled at preesent, on Midodrine (with hold parameters in place, SBP> 160),  Lopressor for heart rate control     4 Anemia - hgb stable;  Retacrit at HD     5 Vasc - s/p  LUE AVF  with intact staples     DC to subacute with HD      [facility]   [employer]  [phone]

## 2021-11-15 NOTE — PROGRESS NOTE ADULT - SUBJECTIVE AND OBJECTIVE BOX
[facility]      Seen earlier today, and examined.  - Today, patient is without complaints.           *****MEDICATIONS: Current medication reviewed and documented.    MEDICATIONS  (STANDING):  ascorbic acid 500 milliGRAM(s) Oral daily  atorvastatin 80 milliGRAM(s) Oral at bedtime  carbidopa/levodopa  25/100 2.5 Tablet(s) Oral <User Schedule>  carbidopa/levodopa  25/100 2 Tablet(s) Oral <User Schedule>  chlorhexidine 4% Liquid 1 Application(s) Topical <User Schedule>  cholecalciferol 1000 Unit(s) Oral daily  dextrose 5%. 1000 milliLiter(s) (100 mL/Hr) IV Continuous <Continuous>  dextrose 5%. 1000 milliLiter(s) (50 mL/Hr) IV Continuous <Continuous>  dextrose 50% Injectable 25 Gram(s) IV Push once  dextrose 50% Injectable 12.5 Gram(s) IV Push once  dextrose 50% Injectable 25 Gram(s) IV Push once  folic acid 1 milliGRAM(s) Oral daily  glucagon  Injectable 1 milliGRAM(s) IntraMuscular once  insulin glargine Injectable (LANTUS) 6 Unit(s) SubCutaneous at bedtime  insulin lispro (ADMELOG) corrective regimen sliding scale   SubCutaneous three times a day before meals  insulin lispro (ADMELOG) corrective regimen sliding scale   SubCutaneous at bedtime  levothyroxine 25 MICROGram(s) Oral daily  memantine 5 milliGRAM(s) Oral two times a day  metoprolol tartrate 25 milliGRAM(s) Oral two times a day  midodrine 20 milliGRAM(s) Oral <User Schedule>  polyethylene glycol 3350 17 Gram(s) Oral two times a day  QUEtiapine 25 milliGRAM(s) Oral at bedtime  senna Syrup 10 milliLiter(s) Oral at bedtime  thiamine IVPB 500 milliGRAM(s) IV Intermittent three times a day  trihexyphenidyl 2 milliGRAM(s) Oral three times a day    MEDICATIONS  (PRN):  acetaminophen     Tablet .. 650 milliGRAM(s) Oral every 6 hours PRN Mild Pain (1 - 3)  albuterol/ipratropium for Nebulization 3 milliLiter(s) Nebulizer every 6 hours PRN Shortness of Breath and/or Wheezing  guaiFENesin Oral Liquid (Sugar-Free) 200 milliGRAM(s) Oral every 6 hours PRN Cough  sodium chloride 0.9% lock flush 10 milliLiter(s) IV Push every 1 hour PRN Pre/post blood products, medications, blood draw, and to maintain line patency          ***** VITAL SIGNS:  T(F): 97.8 (11-15-21 @ 10:37), Max: 98.7 (11-15-21 @ 03:57)  HR: 93 (11-15-21 @ 10:37) (93 - 112)  BP: 134/82 (11-15-21 @ 10:37) (130/79 - 139/86)  RR: 18 (11-15-21 @ 10:37) (17 - 18)  SpO2: 96% (11-15-21 @ 10:37) (96% - 98%)  Wt(kg): --  ,   I&O's Summary    2021 07:01  -  15 Nov 2021 07:00  --------------------------------------------------------  IN: 775 mL / OUT: 0 mL / NET: 775 mL    15 Nov 2021 07:01  -  15 Nov 2021 11:09  --------------------------------------------------------  IN: 120 mL / OUT: 0 mL / NET: 120 mL             *****PHYSICAL EXAM:   alert oriented x 2 refusing to answer questions   poor attention comprehension poor   can follow 1 step command with much coercion.    extremely paranoid, as per daughter in law ( elsy Nurse on ) pt thinks hospital is poisoning him through the iv.   he has made vague accusations to kill nurses, reportedly told his elsy, quick lets get out of here and get my gun)   She denies any visual or auditory hallucinations   He still recognizes his family members no problem     EOmi fundi not visualized      Tongue is midline  does not cooperate with rest of the exam.   Im done leave me alone     Gait not assessed.            *****LAB AND IMAGIN.4    7.88  )-----------( 360      ( 15 Nov 2021 07:33 )             24.4               11-15    138  |  96  |  53<H>  ----------------------------<  117<H>  4.3   |  24  |  5.50<H>    Ca    9.2      15 Nov 2021 07:28  Phos  4.1     11-14                           [All pertinent recent Imaging/Reports reviewed]           *****A S S E S S M E N T   A N D   P L A N :    Excerpt from H&P,"  72yo M w/ PMHx of CAD (s/p CABG ), CKD (unknown stage), DM2, Parkinson's Disease, HTN, depression presents with bilateral leg swelling, patient's daughter in-law at bedside Elsy Quintero ([facility] Nurse) provides the history, the daughter reports the patient is a poor historian, unable to remember having conversations with others and likely cannot weigh risk/benefits of medical conditions, she reports that he has had bilateral lower extremity swelling for the past few months, but over the past 2 weeks it has significantly worsened, he has further difficulty ambulating due to swelling, his outpatient provider attempted to manage with 20mg lasix and then increased to 40mg lasix but the patient never took the increased dose, his symptoms ar persistent throughout the day and are extremely severe, he has developed wounds of the legs with weeping of fluid due to the swelling, she reports that the patient is frequently non-compliant with medications and medical management, he lives at home with his son and daughter in law, he denies chest pain, shortness of breath, fever/chills, cough, sputum, in the ED, he was tachycardic but hemodynamically stable, afebrile, saturating well on room air, labs were significant for elevated BNP, elevated creatinine, imaging showed moderate left pleural effusion, patient was admitted to general medicine for further management          Problem/Recommendations 1:ams of unclear etiology   likely related to multiple metabolic derangements related to uremia  sleep wake cycle disruption and poor nutritional intake  would regulate sleep wake cycle  check b12/b1 folate/tsh /ammonia wnl   thiamine 500 iv q 8 x 2 days after checking vitamin b1 level   nutrition consult for severe protein caloric malnutrition   discussed with elsy, agreeing to start namenda 5 mg bid probable early lewy body dementia related paranoia and agitation   plan likely needs to adjust dose of antipsychotics, however avoid typical antipsychotics  elsy denies any alcohol intake at all.             Problem/Recommendations 2: weakness   r/o orthostatic hypotension   pt shaking on standing up    prob deconditioning    Problem/Recommendations 3: parkinsons   continue current regimen   sinemet 2. 5 twice a day and 2 mg qhs       Thank you for allowing me to participate in the care of this patient. Will continue to follow patient periodically. Please do not hesitate to call me if you have any  questions or if there has been a change in patients neurological status     ________________  Lily Fang MD  [facility] ([facility]  [phone]      33 minutes spent on total encounter; more than 50 % of the visit was  spent counseling about plan of care, compliance to diet/exercise and medication regimen and or  coordinating care by the attending physician.      It is advised that stroke patients follow up with ZACH Albarran @ [phone] in 1- 2 weeks.   Others please follow up with Dr. Michael Nissenbaum [phone]

## 2021-11-15 NOTE — BH CONSULTATION LIAISON ASSESSMENT NOTE - NSBHCHARTREVIEWVS_PSY_A_CORE FT
Vital Signs Last 24 Hrs  T(C): 36.6 (15 Nov 2021 10:37), Max: 37.1 (15 Nov 2021 03:57)  T(F): 97.8 (15 Nov 2021 10:37), Max: 98.7 (15 Nov 2021 03:57)  HR: 93 (15 Nov 2021 10:37) (93 - 112)  BP: 134/82 (15 Nov 2021 10:37) (130/79 - 138/80)  BP(mean): --  RR: 18 (15 Nov 2021 10:37) (17 - 18)  SpO2: 96% (15 Nov 2021 10:37) (96% - 98%)

## 2021-11-15 NOTE — PROGRESS NOTE ADULT - ASSESSMENT
Assessment  DMT2: 71y Male with DM T2 with hyperglycemia, A1C 6.4%, was on insulin at home, now on low-dose basal insulin and coverage, blood sugars are stable and trending within acceptable range,  no hypoglycemic episodes. Patient is eating partial meals, remains confused, restraints 2/2 pulling at permacath, appears comfortable in NAD.  Hypothyroidism: Patient has no history thyroid disease, was not on any thyroid supplements, subclinical with low-normal FT4, lethargic, started on synthroid 25 mcg po daily, FT4 improved to 1.2.  CHF: on medications, stable, monitored.  HTN: Controlled,  on antihypertensive medications.  Parkinsons: on meds, monitored.  CKD: Monitor labs/BMP      Kelsie Reece MD  Cell: [phone]  Office: [phone]     Assessment  DMT2: 71y Male with DM T2 with hyperglycemia, A1C 6.4%, was on insulin at home, now on low-dose basal insulin and coverage, blood sugars are stable and trending within acceptable range,  no hypoglycemic episodes. Patient is eating partial meals,  remains confused, restraints 2/2 pulling at permacath, appears comfortable in NAD.  Hypothyroidism: Patient has no history thyroid disease, was not on any thyroid supplements, subclinical with low-normal FT4, lethargic, started on synthroid 25 mcg po daily, FT4 improved to 1.2.  CHF: on medications, stable, monitored.  HTN: Controlled,  on antihypertensive medications.  Parkinsons: on meds, monitored.  CKD: Monitor labs/BMP      Kelsie Reece MD  Cell: [phone]  Office: [phone]

## 2021-11-15 NOTE — PROGRESS NOTE ADULT - SUBJECTIVE AND OBJECTIVE BOX
Chief complaint  Patient is a 71y old  Male who presents with a chief complaint of leg swelling (15 Nov 2021 12:58)   Review of systems  Patient in bed, looks comfortable, no hypoglycemic episodes.    Labs and Fingersticks  CAPILLARY BLOOD GLUCOSE      POCT Blood Glucose.: 167 mg/dL (15 Nov 2021 11:54)  POCT Blood Glucose.: 134 mg/dL (15 Nov 2021 07:57)  POCT Blood Glucose.: 146 mg/dL (14 Nov 2021 21:37)  POCT Blood Glucose.: 137 mg/dL (14 Nov 2021 16:52)      Anion Gap, Serum: 18 *H* (11-15 @ 07:28)  Anion Gap, Serum: 16 (11-14 @ 06:31)      Calcium, Total Serum: 9.2 (11-15 @ 07:28)  Calcium, Total Serum: 9.3 (11-14 @ 06:31)          11-15    138  |  96  |  53<H>  ----------------------------<  117<H>  4.3   |  24  |  5.50<H>    Ca    9.2      15 Nov 2021 07:28  Phos  4.1     11-14                          7.4    7.88  )-----------( 360      ( 15 Nov 2021 07:33 )             24.4     Medications  MEDICATIONS  (STANDING):  ascorbic acid 500 milliGRAM(s) Oral daily  atorvastatin 80 milliGRAM(s) Oral at bedtime  carbidopa/levodopa  25/100 2.5 Tablet(s) Oral <User Schedule>  carbidopa/levodopa  25/100 2 Tablet(s) Oral <User Schedule>  chlorhexidine 4% Liquid 1 Application(s) Topical <User Schedule>  cholecalciferol 1000 Unit(s) Oral daily  dextrose 5%. 1000 milliLiter(s) (50 mL/Hr) IV Continuous <Continuous>  dextrose 5%. 1000 milliLiter(s) (100 mL/Hr) IV Continuous <Continuous>  dextrose 50% Injectable 25 Gram(s) IV Push once  dextrose 50% Injectable 25 Gram(s) IV Push once  dextrose 50% Injectable 12.5 Gram(s) IV Push once  folic acid 1 milliGRAM(s) Oral daily  glucagon  Injectable 1 milliGRAM(s) IntraMuscular once  insulin glargine Injectable (LANTUS) 6 Unit(s) SubCutaneous at bedtime  insulin lispro (ADMELOG) corrective regimen sliding scale   SubCutaneous three times a day before meals  insulin lispro (ADMELOG) corrective regimen sliding scale   SubCutaneous at bedtime  levothyroxine 25 MICROGram(s) Oral daily  memantine 5 milliGRAM(s) Oral two times a day  metoprolol tartrate 25 milliGRAM(s) Oral two times a day  midodrine 20 milliGRAM(s) Oral <User Schedule>  polyethylene glycol 3350 17 Gram(s) Oral two times a day  QUEtiapine 25 milliGRAM(s) Oral at bedtime  senna Syrup 10 milliLiter(s) Oral at bedtime  thiamine IVPB 500 milliGRAM(s) IV Intermittent three times a day  trihexyphenidyl 2 milliGRAM(s) Oral three times a day      Physical Exam  General: Patient comfortable in bed  Vital Signs Last 12 Hrs  T(F): 97.8 (11-15-21 @ 10:37), Max: 98.7 (11-15-21 @ 03:57)  HR: 93 (11-15-21 @ 10:37) (93 - 112)  BP: 134/82 (11-15-21 @ 10:37) (130/79 - 134/82)  BP(mean): --  RR: 18 (11-15-21 @ 10:37) (18 - 18)  SpO2: 96% (11-15-21 @ 10:37) (96% - 97%)  Neck: No palpable thyroid nodules.  CVS: S1S2, No murmurs  Respiratory: No wheezing, no crepitations  GI: Abdomen soft, bowel sounds positive  Musculoskeletal:  edema lower extremities.   Skin: No skin rashes, no ecchymosis    Diagnostics    Free Thyroxine, Serum: AM Sched. Collection: 06-Nov-2021 06:00 (11-05 @ 13:18)  Free Thyroxine, Serum: AM Sched. Collection: 26-Oct-2021 06:00 (10-25 @ 11:45)  Thyroid Stimulating Hormone, Serum: AM Sched. Collection: 26-Oct-2021 06:00 (10-25 @ 11:45)  Cortisol AM, Serum: 08:00 (10-22 @ 12:47)  Free Thyroxine, Serum: AM Sched. Collection: 23-Oct-2021 06:00 (10-22 @ 12:43)  Thyroid Stimulating Hormone, Serum: AM Sched. Collection: 23-Oct-2021 06:00 (10-22 @ 12:43)           Chief complaint  Patient is a 71y old  Male who presents with a chief complaint of leg swelling (15 Nov 2021 12:58)   Review of systems  Patient in bed, looks comfortable, no hypoglycemic episodes.    Labs and Fingersticks  CAPILLARY BLOOD GLUCOSE      POCT Blood Glucose.: 167 mg/dL (15 Nov 2021 11:54)  POCT Blood Glucose.: 134 mg/dL (15 Nov 2021 07:57)  POCT Blood Glucose.: 146 mg/dL (14 Nov 2021 21:37)  POCT Blood Glucose.: 137 mg/dL (14 Nov 2021 16:52)      Anion Gap, Serum: 18 *H* (11-15 @ 07:28)  Anion Gap, Serum: 16 (11-14 @ 06:31)      Calcium, Total Serum: 9.2 (11-15 @ 07:28)  Calcium, Total Serum: 9.3 (11-14 @ 06:31)          11-15    138  |  96  |  53<H>  ----------------------------<  117<H>  4.3   |  24  |  5.50<H>    Ca    9.2      15 Nov 2021 07:28  Phos  4.1     11-14                          7.4    7.88  )-----------( 360      ( 15 Nov 2021 07:33 )             24.4     Medications  MEDICATIONS  (STANDING):  ascorbic acid 500 milliGRAM(s) Oral daily  atorvastatin 80 milliGRAM(s) Oral at bedtime  carbidopa/levodopa  25/100 2.5 Tablet(s) Oral <User Schedule>  carbidopa/levodopa  25/100 2 Tablet(s) Oral <User Schedule>  chlorhexidine 4% Liquid 1 Application(s) Topical <User Schedule>  cholecalciferol 1000 Unit(s) Oral daily  dextrose 5%. 1000 milliLiter(s) (50 mL/Hr) IV Continuous <Continuous>  dextrose 5%. 1000 milliLiter(s) (100 mL/Hr) IV Continuous <Continuous>  dextrose 50% Injectable 25 Gram(s) IV Push once  dextrose 50% Injectable 25 Gram(s) IV Push once  dextrose 50% Injectable 12.5 Gram(s) IV Push once  folic acid 1 milliGRAM(s) Oral daily  glucagon  Injectable 1 milliGRAM(s) IntraMuscular once  insulin glargine Injectable (LANTUS) 6 Unit(s) SubCutaneous at bedtime  insulin lispro (ADMELOG) corrective regimen sliding scale   SubCutaneous three times a day before meals  insulin lispro (ADMELOG) corrective regimen sliding scale   SubCutaneous at bedtime  levothyroxine 25 MICROGram(s) Oral daily  memantine 5 milliGRAM(s) Oral two times a day  metoprolol tartrate 25 milliGRAM(s) Oral two times a day  midodrine 20 milliGRAM(s) Oral <User Schedule>  polyethylene glycol 3350 17 Gram(s) Oral two times a day  QUEtiapine 25 milliGRAM(s) Oral at bedtime  senna Syrup 10 milliLiter(s) Oral at bedtime  thiamine IVPB 500 milliGRAM(s) IV Intermittent three times a day  trihexyphenidyl 2 milliGRAM(s) Oral three times a day      Physical Exam  General: Patient comfortable in bed  Vital Signs Last 12 Hrs  T(F): 97.8 (11-15-21 @ 10:37), Max: 98.7 (11-15-21 @ 03:57)  HR: 93 (11-15-21 @ 10:37) (93 - 112)  BP: 134/82 (11-15-21 @ 10:37) (130/79 - 134/82)  BP(mean): --  RR: 18 (11-15-21 @ 10:37) (18 - 18)  SpO2: 96% (11-15-21 @ 10:37) (96% - 97%)  Neck: No palpable thyroid nodules.  CVS: S1S2, No murmurs  Respiratory: No wheezing, no crepitations  GI: Abdomen soft, bowel sounds positive  Musculoskeletal:  edema lower extremities.   Skin: No skin rashes, no ecchymosis    Diagnostics    Free Thyroxine, Serum: AM Sched. Collection: 06-Nov-2021 06:00 (11-05 @ 13:18)  Free Thyroxine, Serum: AM Sched. Collection: 26-Oct-2021 06:00 (10-25 @ 11:45)  Thyroid Stimulating Hormone, Serum: AM Sched. Collection: 26-Oct-2021 06:00 (10-25 @ 11:45)  Cortisol AM, Serum: 08:00 (10-22 @ 12:47)  Free Thyroxine, Serum: AM Sched. Collection: 23-Oct-2021 06:00 (10-22 @ 12:43)  Thyroid Stimulating Hormone, Serum: AM Sched. Collection: 23-Oct-2021 06:00 (10-22 @ 12:43)

## 2021-11-16 NOTE — PROGRESS NOTE ADULT - ASSESSMENT
70yo M w/ PMHx of CAD (s/p CABG 2019), CKD (unknown stage), DM2, Parkinson's Disease, HTN, depression presents with new onset acute heart failure exacerbation, NSTEMI, started on hep gtt, developed bl RP bleed, acute anemia. sp MICU course for hemorrhagic shock, 10/28 sp IR embolization l4 and l5 lumbar artery. for permacath and AVF.    # Acute systolic and diastolic heart failure, improved  # NSTEMI- conservative mgmt dt renal function and anemia-bld loss  # ESRD, new HD  # Atrial flutter  # acute hypoxic resp failure, improved  # hemorrhagic shock, left RP hematoma, acute blood loss anemia  # lupus anticoagulant +  Echo TTE mod to severe LV SD, hypokinesis anterior wall, not candidate for cath at this point  HD per renal  10/28 sp IR embolization l4 and l5 lumbar artery  on midodrine during dialysis  WBC stable, improved overall  sp permacath placement  sp L AVF  new left UE swelling, hematoma? concern for bleeding with drop in hgb, transfuse 1 unit prbc and 2 unit FFP  LUE doppler  vascular following    # DM2 (diabetes mellitus, type 2)  per endo  hba1c 6.4    # CAD (coronary artery disease)  # HTN  cont lopressor  c/w atorvastatin  asa on hold    # Parkinsons disease   # delirium  at baseline pt is poor historian but remains confused  remains on restraints as pulling at permacath  psych recs seroquel and olanzpine prn if aggitated  carbidopa/levodopa   c/w trihexyphenidyl    PCP Dr. Ronak Pineda will take over care tomorrow.  Please contact with any questions or concerns [phone].

## 2021-11-16 NOTE — PROGRESS NOTE ADULT - SUBJECTIVE AND OBJECTIVE BOX
Patient is a 71y old  Male who presents with a chief complaint of leg swelling (16 Nov 2021 11:06)      SUBJECTIVE / OVERNIGHT EVENTS:    Patient seen and examined. confused. not oriented. does not complain of pain in left arm.       Vital Signs Last 24 Hrs  T(C): 36.3 (16 Nov 2021 11:24), Max: 36.6 (16 Nov 2021 03:38)  T(F): 97.4 (16 Nov 2021 11:24), Max: 97.8 (16 Nov 2021 03:38)  HR: 87 (16 Nov 2021 11:24) (87 - 107)  BP: 113/66 (16 Nov 2021 11:24) (109/71 - 127/74)  BP(mean): --  RR: 18 (16 Nov 2021 11:24) (18 - 18)  SpO2: 97% (16 Nov 2021 11:24) (94% - 97%)  I&O's Summary    15 Nov 2021 07:01  -  16 Nov 2021 07:00  --------------------------------------------------------  IN: 240 mL / OUT: 0 mL / NET: 240 mL        PE:  GENERAL: AAOx0  HEAD:  Atraumatic, Normocephalic  CHEST/LUNG: Coarse bs  HEART: Regular rate and rhythm; + murmur  ABDOMEN: Soft, Nontender, Nondistended; Bowel sounds present  EXTREMITIES:  2+ Peripheral Pulses, LUE AVF STAPLES, LUE swelling, hematoma? no echymosis, mittens  SKIN: right neck dressing pulled off by patient, no active blding, right chest wall permacath  NEURO: confused    LABS:                        6.9    x     )-----------( x        ( 16 Nov 2021 05:37 )             23.1     11-16    136  |  97  |  62<H>  ----------------------------<  145<H>  4.4   |  24  |  6.46<H>    Ca    8.9      16 Nov 2021 04:53        CAPILLARY BLOOD GLUCOSE      POCT Blood Glucose.: 119 mg/dL (16 Nov 2021 12:13)  POCT Blood Glucose.: 153 mg/dL (16 Nov 2021 08:02)  POCT Blood Glucose.: 169 mg/dL (15 Nov 2021 21:24)  POCT Blood Glucose.: 162 mg/dL (15 Nov 2021 17:09)            RADIOLOGY & ADDITIONAL TESTS:    Imaging Personally Reviewed:  [x] YES  [ ] NO    Consultant(s) Notes Reviewed:  [x] YES  [ ] NO    MEDICATIONS  (STANDING):  ascorbic acid 500 milliGRAM(s) Oral daily  atorvastatin 80 milliGRAM(s) Oral at bedtime  carbidopa/levodopa  25/100 2.5 Tablet(s) Oral <User Schedule>  carbidopa/levodopa  25/100 2 Tablet(s) Oral <User Schedule>  chlorhexidine 4% Liquid 1 Application(s) Topical <User Schedule>  cholecalciferol 1000 Unit(s) Oral daily  dextrose 5%. 1000 milliLiter(s) (50 mL/Hr) IV Continuous <Continuous>  dextrose 5%. 1000 milliLiter(s) (100 mL/Hr) IV Continuous <Continuous>  dextrose 50% Injectable 25 Gram(s) IV Push once  dextrose 50% Injectable 25 Gram(s) IV Push once  dextrose 50% Injectable 12.5 Gram(s) IV Push once  epoetin mari-epbx (RETACRIT) Injectable 64615 Unit(s) IV Push once  folic acid 1 milliGRAM(s) Oral daily  glucagon  Injectable 1 milliGRAM(s) IntraMuscular once  insulin glargine Injectable (LANTUS) 6 Unit(s) SubCutaneous at bedtime  insulin lispro (ADMELOG) corrective regimen sliding scale   SubCutaneous three times a day before meals  insulin lispro (ADMELOG) corrective regimen sliding scale   SubCutaneous at bedtime  levothyroxine 25 MICROGram(s) Oral daily  memantine 5 milliGRAM(s) Oral two times a day  metoprolol tartrate 25 milliGRAM(s) Oral two times a day  midodrine 20 milliGRAM(s) Oral <User Schedule>  polyethylene glycol 3350 17 Gram(s) Oral two times a day  QUEtiapine 25 milliGRAM(s) Oral two times a day  senna Syrup 10 milliLiter(s) Oral at bedtime  thiamine IVPB 500 milliGRAM(s) IV Intermittent three times a day  trihexyphenidyl 2 milliGRAM(s) Oral three times a day    MEDICATIONS  (PRN):  acetaminophen     Tablet .. 650 milliGRAM(s) Oral every 6 hours PRN Mild Pain (1 - 3)  albuterol/ipratropium for Nebulization 3 milliLiter(s) Nebulizer every 6 hours PRN Shortness of Breath and/or Wheezing  guaiFENesin Oral Liquid (Sugar-Free) 200 milliGRAM(s) Oral every 6 hours PRN Cough  QUEtiapine 25 milliGRAM(s) Oral every 6 hours PRN Agitation  sodium chloride 0.9% lock flush 10 milliLiter(s) IV Push every 1 hour PRN Pre/post blood products, medications, blood draw, and to maintain line patency      Care Discussed with Consultants/Other Providers [x] YES  [ ] NO    HEALTH ISSUES - PROBLEM Dx:  Acute heart failure    Atrial flutter    DM2 (diabetes mellitus, type 2)    CAD (coronary artery disease)    Parkinsons disease    Acute on chronic renal failure    NSTEMI (non-ST elevation myocardial infarction)    Hypertension    Acute kidney injury superimposed on CKD    Anemia    Hypothyroidism    Delirium    Advanced care planning/counseling discussion    Palliative care status    Palliative care encounter    Pain    Stage 5 chronic kidney disease not on chronic dialysis

## 2021-11-16 NOTE — PROGRESS NOTE ADULT - SUBJECTIVE AND OBJECTIVE BOX
[facility] [phone]        Patient seen and examined overnight events noted, patient now seen resting in bed no distress, no shortness of breath.         MEDICATIONS  (STANDING):  ascorbic acid 500 milliGRAM(s) Oral daily  atorvastatin 80 milliGRAM(s) Oral at bedtime  carbidopa/levodopa  25/100 2.5 Tablet(s) Oral <User Schedule>  carbidopa/levodopa  25/100 2 Tablet(s) Oral <User Schedule>  chlorhexidine 4% Liquid 1 Application(s) Topical <User Schedule>  cholecalciferol 1000 Unit(s) Oral daily  dextrose 5%. 1000 milliLiter(s) (50 mL/Hr) IV Continuous <Continuous>  dextrose 5%. 1000 milliLiter(s) (100 mL/Hr) IV Continuous <Continuous>  dextrose 50% Injectable 25 Gram(s) IV Push once  dextrose 50% Injectable 12.5 Gram(s) IV Push once  dextrose 50% Injectable 25 Gram(s) IV Push once  folic acid 1 milliGRAM(s) Oral daily  glucagon  Injectable 1 milliGRAM(s) IntraMuscular once  insulin glargine Injectable (LANTUS) 6 Unit(s) SubCutaneous at bedtime  insulin lispro (ADMELOG) corrective regimen sliding scale   SubCutaneous three times a day before meals  insulin lispro (ADMELOG) corrective regimen sliding scale   SubCutaneous at bedtime  levothyroxine 25 MICROGram(s) Oral daily  memantine 5 milliGRAM(s) Oral two times a day  metoprolol tartrate 25 milliGRAM(s) Oral two times a day  midodrine 20 milliGRAM(s) Oral <User Schedule>  polyethylene glycol 3350 17 Gram(s) Oral two times a day  QUEtiapine 25 milliGRAM(s) Oral two times a day  senna Syrup 10 milliLiter(s) Oral at bedtime  thiamine IVPB 500 milliGRAM(s) IV Intermittent three times a day  trihexyphenidyl 2 milliGRAM(s) Oral three times a day      VITAL:  T(C): , Max: 36.6 (11-15-21 @ 10:37)  T(F): , Max: 97.8 (11-15-21 @ 10:37)  HR: 107 (11-16-21 @ 03:38)  BP: 109/71 (11-16-21 @ 03:38)  BP(mean): --  RR: 18 (11-16-21 @ 03:38)  SpO2: 95% (11-16-21 @ 03:38)  Wt(kg): --    I and O's:    11-15 @ 07:01  -  11-16 @ 07:00  --------------------------------------------------------  IN: 240 mL / OUT: 0 mL / NET: 240 mL          PHYSICAL EXAM:    Constitutional: NAD  Neck:  No JVD  Respiratory: CTAB/L  Cardiovascular: S1 and S2  Gastrointestinal: BS+, soft, NT/ND  Extremities: No peripheral edema  Neurological: A/O x 2, no focal deficits  Psychiatric: Normal mood, normal affect  : No Javier  Skin: No rashes  Access: permcath, AVF     LABS:                        6.9    x     )-----------( x        ( 16 Nov 2021 05:37 )             23.1     11-16    136  |  97  |  62<H>  ----------------------------<  145<H>  4.4   |  24  |  6.46<H>    Ca    8.9      16 Nov 2021 04:53            Urine Studies:          RADIOLOGY & ADDITIONAL STUDIES:

## 2021-11-16 NOTE — PROGRESS NOTE ADULT - SUBJECTIVE AND OBJECTIVE BOX
Follow-up Pulm Progress Note  Brayan Verma MD  [phone]    Sleeping comforably on room air: sats in mid 90's  No new resp issues      Vital Signs Last 24 Hrs  T(C): 36.6 (16 Nov 2021 03:38), Max: 36.6 (16 Nov 2021 03:38)  T(F): 97.8 (16 Nov 2021 03:38), Max: 97.8 (16 Nov 2021 03:38)  HR: 107 (16 Nov 2021 03:38) (100 - 107)  BP: 109/71 (16 Nov 2021 03:38) (109/71 - 127/74)  BP(mean): --  RR: 18 (16 Nov 2021 03:38) (18 - 18)  SpO2: 95% (16 Nov 2021 03:38) (94% - 95%)                          6.9    x     )-----------( x        ( 16 Nov 2021 05:37 )             23.1       11-16    136  |  97  |  62<H>  ----------------------------<  145<H>  4.4   |  24  |  6.46<H>    Ca    8.9      16 Nov 2021 04:53      Physical Examination:  PULM: Diminished basilar BS  CVS: Regular rate and rhythm, no murmurs, rubs, or gallops  ABD: Soft, non-tender  EXT:  No clubbing, cyanosis, or edema    RADIOLOGY REVIEWED  CXR:    CT chest:    TTE:

## 2021-11-16 NOTE — PROGRESS NOTE ADULT - SUBJECTIVE AND OBJECTIVE BOX
Chief complaint  Patient is a 71y old  Male who presents with a chief complaint of leg swelling (16 Nov 2021 12:21)   Review of systems  Patient in bed, looks comfortable, no hypoglycemic episodes.    Labs and Fingersticks  CAPILLARY BLOOD GLUCOSE      POCT Blood Glucose.: 119 mg/dL (16 Nov 2021 12:13)  POCT Blood Glucose.: 153 mg/dL (16 Nov 2021 08:02)  POCT Blood Glucose.: 169 mg/dL (15 Nov 2021 21:24)  POCT Blood Glucose.: 162 mg/dL (15 Nov 2021 17:09)      Anion Gap, Serum: 15 (11-16 @ 04:53)  Anion Gap, Serum: 18 *H* (11-15 @ 07:28)      Calcium, Total Serum: 8.9 (11-16 @ 04:53)  Calcium, Total Serum: 9.2 (11-15 @ 07:28)          11-16    136  |  97  |  62<H>  ----------------------------<  145<H>  4.4   |  24  |  6.46<H>    Ca    8.9      16 Nov 2021 04:53                          6.9    x     )-----------( x        ( 16 Nov 2021 05:37 )             23.1     Medications  MEDICATIONS  (STANDING):  ascorbic acid 500 milliGRAM(s) Oral daily  atorvastatin 80 milliGRAM(s) Oral at bedtime  carbidopa/levodopa  25/100 2.5 Tablet(s) Oral <User Schedule>  carbidopa/levodopa  25/100 2 Tablet(s) Oral <User Schedule>  chlorhexidine 4% Liquid 1 Application(s) Topical <User Schedule>  cholecalciferol 1000 Unit(s) Oral daily  dextrose 5%. 1000 milliLiter(s) (50 mL/Hr) IV Continuous <Continuous>  dextrose 5%. 1000 milliLiter(s) (100 mL/Hr) IV Continuous <Continuous>  dextrose 50% Injectable 25 Gram(s) IV Push once  dextrose 50% Injectable 12.5 Gram(s) IV Push once  dextrose 50% Injectable 25 Gram(s) IV Push once  epoetin mari-epbx (RETACRIT) Injectable 35107 Unit(s) IV Push once  folic acid 1 milliGRAM(s) Oral daily  glucagon  Injectable 1 milliGRAM(s) IntraMuscular once  insulin glargine Injectable (LANTUS) 6 Unit(s) SubCutaneous at bedtime  insulin lispro (ADMELOG) corrective regimen sliding scale   SubCutaneous three times a day before meals  insulin lispro (ADMELOG) corrective regimen sliding scale   SubCutaneous at bedtime  levothyroxine 25 MICROGram(s) Oral daily  memantine 5 milliGRAM(s) Oral two times a day  metoprolol tartrate 25 milliGRAM(s) Oral two times a day  midodrine 20 milliGRAM(s) Oral <User Schedule>  polyethylene glycol 3350 17 Gram(s) Oral two times a day  QUEtiapine 25 milliGRAM(s) Oral two times a day  senna Syrup 10 milliLiter(s) Oral at bedtime  thiamine IVPB 500 milliGRAM(s) IV Intermittent three times a day  trihexyphenidyl 2 milliGRAM(s) Oral three times a day      Physical Exam  General: Patient comfortable in bed  Vital Signs Last 12 Hrs  T(F): 97.4 (11-16-21 @ 11:24), Max: 97.8 (11-16-21 @ 03:38)  HR: 87 (11-16-21 @ 11:24) (87 - 107)  BP: 113/66 (11-16-21 @ 11:24) (109/71 - 113/66)  BP(mean): --  RR: 18 (11-16-21 @ 11:24) (18 - 18)  SpO2: 97% (11-16-21 @ 11:24) (95% - 97%)  Neck: No palpable thyroid nodules.  CVS: S1S2, No murmurs  Respiratory: No wheezing, no crepitations  GI: Abdomen soft, bowel sounds positive  Musculoskeletal:  edema lower extremities.   Skin: No skin rashes, no ecchymosis    Diagnostics    Free Thyroxine, Serum: AM Sched. Collection: 06-Nov-2021 06:00 (11-05 @ 13:18)  Free Thyroxine, Serum: AM Sched. Collection: 26-Oct-2021 06:00 (10-25 @ 11:45)  Thyroid Stimulating Hormone, Serum: AM Sched. Collection: 26-Oct-2021 06:00 (10-25 @ 11:45)  Cortisol AM, Serum: 08:00 (10-22 @ 12:47)  Free Thyroxine, Serum: AM Sched. Collection: 23-Oct-2021 06:00 (10-22 @ 12:43)  Thyroid Stimulating Hormone, Serum: AM Sched. Collection: 23-Oct-2021 06:00 (10-22 @ 12:43)           Chief complaint  Patient is a 71y old  Male who presents with a chief complaint of leg swelling (16 Nov 2021 12:21)   Review of systems  Patient in bed, looks comfortable, no hypoglycemic episodes.    Labs and Fingersticks  CAPILLARY BLOOD GLUCOSE      POCT Blood Glucose.: 119 mg/dL (16 Nov 2021 12:13)  POCT Blood Glucose.: 153 mg/dL (16 Nov 2021 08:02)  POCT Blood Glucose.: 169 mg/dL (15 Nov 2021 21:24)  POCT Blood Glucose.: 162 mg/dL (15 Nov 2021 17:09)      Anion Gap, Serum: 15 (11-16 @ 04:53)  Anion Gap, Serum: 18 *H* (11-15 @ 07:28)      Calcium, Total Serum: 8.9 (11-16 @ 04:53)  Calcium, Total Serum: 9.2 (11-15 @ 07:28)          11-16    136  |  97  |  62<H>  ----------------------------<  145<H>  4.4   |  24  |  6.46<H>    Ca    8.9      16 Nov 2021 04:53                          6.9    x     )-----------( x        ( 16 Nov 2021 05:37 )             23.1     Medications  MEDICATIONS  (STANDING):  ascorbic acid 500 milliGRAM(s) Oral daily  atorvastatin 80 milliGRAM(s) Oral at bedtime  carbidopa/levodopa  25/100 2.5 Tablet(s) Oral <User Schedule>  carbidopa/levodopa  25/100 2 Tablet(s) Oral <User Schedule>  chlorhexidine 4% Liquid 1 Application(s) Topical <User Schedule>  cholecalciferol 1000 Unit(s) Oral daily  dextrose 5%. 1000 milliLiter(s) (50 mL/Hr) IV Continuous <Continuous>  dextrose 5%. 1000 milliLiter(s) (100 mL/Hr) IV Continuous <Continuous>  dextrose 50% Injectable 25 Gram(s) IV Push once  dextrose 50% Injectable 12.5 Gram(s) IV Push once  dextrose 50% Injectable 25 Gram(s) IV Push once  epoetin mari-epbx (RETACRIT) Injectable 29152 Unit(s) IV Push once  folic acid 1 milliGRAM(s) Oral daily  glucagon  Injectable 1 milliGRAM(s) IntraMuscular once  insulin glargine Injectable (LANTUS) 6 Unit(s) SubCutaneous at bedtime  insulin lispro (ADMELOG) corrective regimen sliding scale   SubCutaneous three times a day before meals  insulin lispro (ADMELOG) corrective regimen sliding scale   SubCutaneous at bedtime  levothyroxine 25 MICROGram(s) Oral daily  memantine 5 milliGRAM(s) Oral two times a day  metoprolol tartrate 25 milliGRAM(s) Oral two times a day  midodrine 20 milliGRAM(s) Oral <User Schedule>  polyethylene glycol 3350 17 Gram(s) Oral two times a day  QUEtiapine 25 milliGRAM(s) Oral two times a day  senna Syrup 10 milliLiter(s) Oral at bedtime  thiamine IVPB 500 milliGRAM(s) IV Intermittent three times a day  trihexyphenidyl 2 milliGRAM(s) Oral three times a day      Physical Exam  General: Patient comfortable in bed  Vital Signs Last 12 Hrs  T(F): 97.4 (11-16-21 @ 11:24), Max: 97.8 (11-16-21 @ 03:38)  HR: 87 (11-16-21 @ 11:24) (87 - 107)  BP: 113/66 (11-16-21 @ 11:24) (109/71 - 113/66)  BP(mean): --  RR: 18 (11-16-21 @ 11:24) (18 - 18)  SpO2: 97% (11-16-21 @ 11:24) (95% - 97%)  Neck: No palpable thyroid nodules.  CVS: S1S2, No murmurs  Respiratory: No wheezing, no crepitations  GI: Abdomen soft, bowel sounds positive  Musculoskeletal:  edema lower extremities.   Skin: No skin rashes, no ecchymosis    Diagnostics    Free Thyroxine, Serum: AM Sched. Collection: 06-Nov-2021 06:00 (11-05 @ 13:18)  Free Thyroxine, Serum: AM Sched. Collection: 26-Oct-2021 06:00 (10-25 @ 11:45)  Thyroid Stimulating Hormone, Serum: AM Sched. Collection: 26-Oct-2021 06:00 (10-25 @ 11:45)  Cortisol AM, Serum: 08:00 (10-22 @ 12:47)  Free Thyroxine, Serum: AM Sched. Collection: 23-Oct-2021 06:00 (10-22 @ 12:43)  Thyroid Stimulating Hormone, Serum: AM Sched. Collection: 23-Oct-2021 06:00 (10-22 @ 12:43)

## 2021-11-16 NOTE — PROGRESS NOTE ADULT - ASSESSMENT
Assessment:  70yo M w/ PMHx of CAD (s/p CABG 2019), CKD (unknown stage), DM2, Parkinson's Disease, HTN, depression presents with bilateral leg swelling  ESRD   Severe LV dysfunction ;  A flutter   Confusion - alert at present     1 IR-  s/p left upper arm AVF  with intact staples    2 Renal-   HD today     3 CVS- BP controlled at preesent, on Midodrine (with hold parameters in place, SBP> 160),  Lopressor for heart rate control     4 Anemia - hgb lower today;  Retacrit at HD, a/w 1 unit PRBC today     5 Vasc - s/p  LUE AVF  with intact staples    DC planning to subacute with HD     Phylicia Ornelas NP  [facility]  [phone]

## 2021-11-16 NOTE — PROGRESS NOTE ADULT - ATTENDING COMMENTS
renal attd    -pt seen with NP  HD today   dc planning     [facility]   [phone]

## 2021-11-16 NOTE — PROGRESS NOTE ADULT - SUBJECTIVE AND OBJECTIVE BOX
[facility]      Seen earlier today, and examined.  - Today, patient is without complaints.           *****MEDICATIONS: Current medication reviewed and documented.    MEDICATIONS  (STANDING):  ascorbic acid 500 milliGRAM(s) Oral daily  atorvastatin 80 milliGRAM(s) Oral at bedtime  carbidopa/levodopa  25/100 2.5 Tablet(s) Oral <User Schedule>  carbidopa/levodopa  25/100 2 Tablet(s) Oral <User Schedule>  chlorhexidine 4% Liquid 1 Application(s) Topical <User Schedule>  cholecalciferol 1000 Unit(s) Oral daily  dextrose 5%. 1000 milliLiter(s) (50 mL/Hr) IV Continuous <Continuous>  dextrose 5%. 1000 milliLiter(s) (100 mL/Hr) IV Continuous <Continuous>  dextrose 50% Injectable 25 Gram(s) IV Push once  dextrose 50% Injectable 12.5 Gram(s) IV Push once  dextrose 50% Injectable 25 Gram(s) IV Push once  folic acid 1 milliGRAM(s) Oral daily  glucagon  Injectable 1 milliGRAM(s) IntraMuscular once  insulin glargine Injectable (LANTUS) 6 Unit(s) SubCutaneous at bedtime  insulin lispro (ADMELOG) corrective regimen sliding scale   SubCutaneous at bedtime  insulin lispro (ADMELOG) corrective regimen sliding scale   SubCutaneous three times a day before meals  levothyroxine 25 MICROGram(s) Oral daily  memantine 5 milliGRAM(s) Oral at bedtime  metoprolol tartrate 25 milliGRAM(s) Oral two times a day  midodrine 20 milliGRAM(s) Oral <User Schedule>  polyethylene glycol 3350 17 Gram(s) Oral two times a day  QUEtiapine 12.5 milliGRAM(s) Oral two times a day  senna Syrup 10 milliLiter(s) Oral at bedtime  thiamine IVPB 500 milliGRAM(s) IV Intermittent three times a day  trihexyphenidyl 2 milliGRAM(s) Oral three times a day    MEDICATIONS  (PRN):  acetaminophen     Tablet .. 650 milliGRAM(s) Oral every 6 hours PRN Mild Pain (1 - 3)  albuterol/ipratropium for Nebulization 3 milliLiter(s) Nebulizer every 6 hours PRN Shortness of Breath and/or Wheezing  guaiFENesin Oral Liquid (Sugar-Free) 200 milliGRAM(s) Oral every 6 hours PRN Cough  QUEtiapine 25 milliGRAM(s) Oral every 6 hours PRN Agitation  sodium chloride 0.9% lock flush 10 milliLiter(s) IV Push every 1 hour PRN Pre/post blood products, medications, blood draw, and to maintain line patency          ***** VITAL SIGNS:  T(F): 97 (21 @ 17:05), Max: 97.8 (21 @ 03:38)  HR: 84 (21 @ 17:05) (84 - 107)  BP: 119/71 (21 @ 17:05) (109/71 - 127/74)  RR: 18 (21 @ 17:05) (18 - 18)  SpO2: 98% (21 @ 17:05) (94% - 98%)  Wt(kg): --  ,   I&O's Summary    15 Nov 2021 07:  -  2021 07:00  --------------------------------------------------------  IN: 240 mL / OUT: 0 mL / NET: 240 mL    2021 07:  -  2021 17:24  --------------------------------------------------------  IN: 180 mL / OUT: 0 mL / NET: 180 mL             *****PHYSICAL EXAM: sleepy   not cooperative  answers one word answers appropriately   does not seem agitated today        *****LAB AND IMAGIN.9    x     )-----------( x        ( 2021 05:37 )             23.1                   136  |  97  |  62<H>  ----------------------------<  145<H>  4.4   |  24  |  6.46<H>    Ca    8.9      2021 04:53                           [All pertinent recent Imaging/Reports reviewed]           *****A S S E S S M E N T   A N D   P L A N :        Excerpt from H&P,"  70yo M w/ PMHx of CAD (s/p CABG 2019), CKD (unknown stage), DM2, Parkinson's Disease, HTN, depression presents with bilateral leg swelling, patient's daughter in-law at bedside Elsy Quintero (4 [facility] Nurse) provides the history, the daughter reports the patient is a poor historian, unable to remember having conversations with others and likely cannot weigh risk/benefits of medical conditions, she reports that he has had bilateral lower extremity swelling for the past few months, but over the past 2 weeks it has significantly worsened, he has further difficulty ambulating due to swelling, his outpatient provider attempted to manage with 20mg lasix and then increased to 40mg lasix but the patient never took the increased dose, his symptoms ar persistent throughout the day and are extremely severe, he has developed wounds of the legs with weeping of fluid due to the swelling, she reports that the patient is frequently non-compliant with medications and medical management, he lives at home with his son and daughter in law, he denies chest pain, shortness of breath, fever/chills, cough, sputum, in the ED, he was tachycardic but hemodynamically stable, afebrile, saturating well on room air, labs were significant for elevated BNP, elevated creatinine, imaging showed moderate left pleural effusion, patient was admitted to general medicine for further management          Problem/Recommendations 1:ams of unclear etiology   likely related to multiple metabolic derangements related to uremia  sleep wake cycle disruption and poor nutritional intake  would regulate sleep wake cycle  check b12/b1 folate/tsh /ammonia wnl   thiamine 500 iv q 8 x 2 days after checking vitamin b1 level   nutrition consult for severe protein caloric malnutrition   discussed with elsy, agreeing to start namenda 5 mg qhs probable early lewy body dementia related paranoia and agitation   plan likely needs to adjust dose of antipsychotics, however avoid typical antipsychotics  elsy denies any alcohol intake at all.     lowered seroquel 12.5 due to sedation         Problem/Recommendations 2: weakness   r/o orthostatic hypotension   pt shaking on standing up    prob deconditioning    Problem/Recommendations 3: parkinsons   continue current regimen   sinemet 2. 5 twice a day and 2 mg qhs     Thank you for allowing me to participate in the care of this patient. Will continue to follow patient periodically. Please do not hesitate to call me if you have any  questions or if there has been a change in patients neurological status     ________________  Lily Fang MD  [facility]rological [facility] ([facility]  [phone]      33 minutes spent on total encounter; more than 50 % of the visit was  spent counseling about plan of care, compliance to diet/exercise and medication regimen and or  coordinating care by the attending physician.      It is advised that stroke patients follow up with ZACH Albarran @ [phone] in 1- 2 weeks.   Others please follow up with Dr. Michael Nissenbaum [phone]

## 2021-11-16 NOTE — CHART NOTE - NSCHARTNOTEFT_GEN_A_CORE
Medicine PA Episodic Note    Patient is a 71y old  Male who presents with a chief complaint of leg swelling (15 Nov 2021 22:53)    Notified by RN of pt. having LUE swelling & edema. Pt. seen and examined at bedside, AOx2, but confused and unable    Vital Signs Last 24 Hrs  T(C): 36.5 (15 Nov 2021 20:34), Max: 37.1 (15 Nov 2021 03:57)  T(F): 97.7 (15 Nov 2021 20:34), Max: 98.7 (15 Nov 2021 03:57)  HR: 101 (15 Nov 2021 20:34) (93 - 112)  BP: 127/74 (15 Nov 2021 20:34) (120/71 - 134/82)  BP(mean): --  RR: 18 (15 Nov 2021 20:34) (18 - 18)  SpO2: 95% (15 Nov 2021 20:34) (94% - 97%)      Labs:                          7.4    7.88  )-----------( 360      ( 15 Nov 2021 07:33 )             24.4     11-15    138  |  96  |  53<H>  ----------------------------<  117<H>  4.3   |  24  |  5.50<H>    Ca    9.2      15 Nov 2021 07:28  Phos  4.1     11-14          Radiology:    Physical Exam:  General: WN/WD NAD  Neurology: A&Ox3, nonfocal, SAMPSON x 4  Head:  Normocephalic, atraumatic  Respiratory: CTA B/L  CV: RRR, S1S2, no murmur  Abdominal: Soft, NT, ND no palpable mass  MSK: No edema, + peripheral pulses, FROM all 4 extremity    Assessment & Plan:  HPI:  70yo M w/ PMHx of CAD (s/p CABG 2019), CKD (unknown stage), DM2, Parkinson's Disease, HTN, depression presents with bilateral leg swelling, patient's daughter in-law at bedside Yoly Quintero (4 [facility] Nurse) provides the history, the daughter reports the patient is a poor historian, unable to remember having conversations with others and likely cannot weigh risk/benefits of medical conditions, she reports that he has had bilateral lower extremity swelling for the past few months, but over the past 2 weeks it has significantly worsened, he has further difficulty ambulating due to swelling, his outpatient provider attempted to manage with 20mg lasix and then increased to 40mg lasix but the patient never took the increased dose, his symptoms ar persistent throughout the day and are extremely severe, he has developed wounds of the legs with weeping of fluid due to the swelling, she reports that the patient is frequently non-compliant with medications and medical management, he lives at home with his son and daughter in law, he denies chest pain, shortness of breath, fever/chills, cough, sputum, in the ED, he was tachycardic but hemodynamically stable, afebrile, saturating well on room air, labs were significant for elevated BNP, elevated creatinine, imaging showed moderate left pleural effusion, patient was admitted to general medicine for further management  (21 Oct 2021 07:06)      Plan:        Follow up with Attending in AM.    Rodriguez Duenas PA-C  Department of Medicine  [facility] PA Episodic Note    Patient is a 71y old  Male who presents with a chief complaint of leg swelling (15 Nov 2021 22:53)    Notified by RN of pt. having LUE swelling & edema. Pt. seen and examined at bedside, AOx2, but confused and unable to obtain ROS. Upon exam pt. noted to have significant LUE swelling. Of note, pt. is s/p LUE AVF placement on 11/12.    Vital Signs Last 24 Hrs  T(C): 36.5 (15 Nov 2021 20:34), Max: 37.1 (15 Nov 2021 03:57)  T(F): 97.7 (15 Nov 2021 20:34), Max: 98.7 (15 Nov 2021 03:57)  HR: 101 (15 Nov 2021 20:34) (93 - 112)  BP: 127/74 (15 Nov 2021 20:34) (120/71 - 134/82)  BP(mean): --  RR: 18 (15 Nov 2021 20:34) (18 - 18)  SpO2: 95% (15 Nov 2021 20:34) (94% - 97%)      Labs:                          7.4    7.88  )-----------( 360      ( 15 Nov 2021 07:33 )             24.4     11-15    138  |  96  |  53<H>  ----------------------------<  117<H>  4.3   |  24  |  5.50<H>    Ca    9.2      15 Nov 2021 07:28  Phos  4.1     11-14          Radiology:    Physical Exam:  General: WN/WD NAD  Neurology: A&Ox3, nonfocal, SAMPSON x 4  Head:  Normocephalic, atraumatic  Respiratory: CTA B/L  CV: RRR, S1S2, no murmur  Abdominal: Soft, NT, ND no palpable mass  MSK: No edema, + peripheral pulses, FROM all 4 extremity    Assessment & Plan:  HPI:  70yo M w/ PMHx of CAD (s/p CABG 2019), CKD (unknown stage), DM2, Parkinson's Disease, HTN, depression presents with bilateral leg swelling, patient's daughter in-law at bedside Yoly Leon (4 [facility] Nurse) provides the history, the daughter reports the patient is a poor historian, unable to remember having conversations with others and likely cannot weigh risk/benefits of medical conditions, she reports that he has had bilateral lower extremity swelling for the past few months, but over the past 2 weeks it has significantly worsened, he has further difficulty ambulating due to swelling, his outpatient provider attempted to manage with 20mg lasix and then increased to 40mg lasix but the patient never took the increased dose, his symptoms ar persistent throughout the day and are extremely severe, he has developed wounds of the legs with weeping of fluid due to the swelling, she reports that the patient is frequently non-compliant with medications and medical management, he lives at home with his son and daughter in law, he denies chest pain, shortness of breath, fever/chills, cough, sputum, in the ED, he was tachycardic but hemodynamically stable, afebrile, saturating well on room air, labs were significant for elevated BNP, elevated creatinine, imaging showed moderate left pleural effusion, patient was admitted to general medicine for further management  (21 Oct 2021 07:06)      Plan:        Follow up with Attending in AM.    Rodriguez Duenas PA-C  Department of Medicine  [facility] PA Episodic Note    Patient is a 71y old  Male who presents with a chief complaint of leg swelling (15 Nov 2021 22:53)    Notified by RN of pt. having LUE swelling & edema. Pt. seen and examined at bedside, AOx2, but confused and unable to obtain ROS. Upon exam pt. noted to have significant LUE swelling. Of note, pt. is s/p LUE AVF placement on 11/12.    Vital Signs Last 24 Hrs  T(C): 36.5 (15 Nov 2021 20:34), Max: 37.1 (15 Nov 2021 03:57)  T(F): 97.7 (15 Nov 2021 20:34), Max: 98.7 (15 Nov 2021 03:57)  HR: 101 (15 Nov 2021 20:34) (93 - 112)  BP: 127/74 (15 Nov 2021 20:34) (120/71 - 134/82)  BP(mean): --  RR: 18 (15 Nov 2021 20:34) (18 - 18)  SpO2: 95% (15 Nov 2021 20:34) (94% - 97%)      Labs:                          7.4    7.88  )-----------( 360      ( 15 Nov 2021 07:33 )             24.4     11-15    138  |  96  |  53<H>  ----------------------------<  117<H>  4.3   |  24  |  5.50<H>    Ca    9.2      15 Nov 2021 07:28  Phos  4.1     11-14          Radiology: PENDING    Physical Exam:  General: WN/WD NAD  Neurology: A&Ox3, nonfocal, SAMPSON x 4  Head:  Normocephalic, atraumatic  Respiratory: CTA B/L  CV: RRR, S1S2, no murmur  Abdominal: Soft, NT, ND no palpable mass  MSK: +1 LUE edema, + peripheral pulses, L AVF site C/D/I with thrill intact. Palpable radial pulse, + thrill    Assessment & Plan:  HPI:  72yo M w/ PMHx of CAD (s/p CABG 2019), CKD (unknown stage), DM2, Parkinson's Disease, HTN, depression presents with bilateral leg swelling, patient's daughter in-law at bedside Yoly Quintero (4 [facility] Nurse) provides the history, the daughter reports the patient is a poor historian, unable to remember having conversations with others and likely cannot weigh risk/benefits of medical conditions, she reports that he has had bilateral lower extremity swelling for the past few months, but over the past 2 weeks it has significantly worsened, he has further difficulty ambulating due to swelling, his outpatient provider attempted to manage with 20mg lasix and then increased to 40mg lasix but the patient never took the increased dose, his symptoms ar persistent throughout the day and are extremely severe, he has developed wounds of the legs with weeping of fluid due to the swelling, she reports that the patient is frequently non-compliant with medications and medical management, he lives at home with his son and daughter in law, he denies chest pain, shortness of breath, fever/chills, cough, sputum, in the ED, he was tachycardic but hemodynamically stable, afebrile, saturating well on room air, labs were significant for elevated BNP, elevated creatinine, imaging showed moderate left pleural effusion, patient was admitted to general medicine for further management  (21 Oct 2021 07:06)    Now w/ LUE swelling s/p L arm AVF placement on 11/12.    Plan:  #LUE swelling  - VA DUPLEX LUE  - Elevate LUE above heart level  - Consider reconsulting vascular if swelling persists or worsens  - Monitor VS  - Will endorse to day team in AM      Follow up with Attending in AM.    Rodriguez Duenas PA-C  Department of Medicine  [facility] [id number]

## 2021-11-16 NOTE — PROGRESS NOTE ADULT - SUBJECTIVE AND OBJECTIVE BOX
SURGERY DAILY PROGRESS NOTE:     SUBJECTIVE/ROS: Vascular re-consulted for swelling of LUE s/p brachiocephalic fistula on      MEDICATIONS  (STANDING):  ascorbic acid 500 milliGRAM(s) Oral daily  atorvastatin 80 milliGRAM(s) Oral at bedtime  carbidopa/levodopa  25/100 2.5 Tablet(s) Oral <User Schedule>  carbidopa/levodopa  25/100 2 Tablet(s) Oral <User Schedule>  chlorhexidine 4% Liquid 1 Application(s) Topical <User Schedule>  cholecalciferol 1000 Unit(s) Oral daily  dextrose 5%. 1000 milliLiter(s) (100 mL/Hr) IV Continuous <Continuous>  dextrose 5%. 1000 milliLiter(s) (50 mL/Hr) IV Continuous <Continuous>  dextrose 50% Injectable 25 Gram(s) IV Push once  dextrose 50% Injectable 12.5 Gram(s) IV Push once  dextrose 50% Injectable 25 Gram(s) IV Push once  folic acid 1 milliGRAM(s) Oral daily  glucagon  Injectable 1 milliGRAM(s) IntraMuscular once  insulin glargine Injectable (LANTUS) 6 Unit(s) SubCutaneous at bedtime  insulin lispro (ADMELOG) corrective regimen sliding scale   SubCutaneous three times a day before meals  insulin lispro (ADMELOG) corrective regimen sliding scale   SubCutaneous at bedtime  levothyroxine 25 MICROGram(s) Oral daily  memantine 5 milliGRAM(s) Oral two times a day  metoprolol tartrate 25 milliGRAM(s) Oral two times a day  midodrine 20 milliGRAM(s) Oral <User Schedule>  polyethylene glycol 3350 17 Gram(s) Oral two times a day  QUEtiapine 25 milliGRAM(s) Oral two times a day  senna Syrup 10 milliLiter(s) Oral at bedtime  thiamine IVPB 500 milliGRAM(s) IV Intermittent three times a day  trihexyphenidyl 2 milliGRAM(s) Oral three times a day    MEDICATIONS  (PRN):  acetaminophen     Tablet .. 650 milliGRAM(s) Oral every 6 hours PRN Mild Pain (1 - 3)  albuterol/ipratropium for Nebulization 3 milliLiter(s) Nebulizer every 6 hours PRN Shortness of Breath and/or Wheezing  guaiFENesin Oral Liquid (Sugar-Free) 200 milliGRAM(s) Oral every 6 hours PRN Cough  QUEtiapine 25 milliGRAM(s) Oral every 6 hours PRN Agitation  sodium chloride 0.9% lock flush 10 milliLiter(s) IV Push every 1 hour PRN Pre/post blood products, medications, blood draw, and to maintain line patency      OBJECTIVE:    Vital Signs Last 24 Hrs  T(C): 36.6 (2021 03:38), Max: 36.6 (15 Nov 2021 10:37)  T(F): 97.8 (2021 03:38), Max: 97.8 (15 Nov 2021 10:37)  HR: 107 (2021 03:38) (93 - 107)  BP: 109/71 (2021 03:38) (109/71 - 134/82)  BP(mean): --  RR: 18 (2021 03:38) (18 - 18)  SpO2: 95% (2021 03:38) (94% - 96%)    I&O's Detail    15 Nov 2021 07:01  -  2021 07:00  --------------------------------------------------------  IN:    Oral Fluid: 240 mL  Total IN: 240 mL    OUT:  Total OUT: 0 mL    Total NET: 240 mL          Daily     Daily Weight in k.6 (15 Nov 2021 08:38)    LABS:                        6.9    x     )-----------( x        ( 2021 05:37 )             23.1     11-    136  |  97  |  62<H>  ----------------------------<  145<H>  4.4   |  24  |  6.46<H>    Ca    8.9      2021 04:53      PHYSICAL EXAM:  General: AAOx3, NAD, lying comfortably in bed  Respiratory: nonlabored breathing  Abdomen: non-distended, soft, non-tender  Extremities: LUE with increased swelling; thrill palpable in LUE fistula, firmness noted to palpation on distal portion of AVF    ASSESSMENT AND PLAN:     - please obtain duplex of fistula to assess for hematoma  - please keep LUE elevated and ACE wrapped for swelling  - vascular to follow    Vascular Surgery, [id number]

## 2021-11-16 NOTE — PROGRESS NOTE ADULT - ASSESSMENT
Assessment  DMT2: 71y Male with DM T2 with hyperglycemia, A1C 6.4%, was on insulin at home, now on low-dose basal insulin and coverage, blood sugars are stable and trending within acceptable range, no hypoglycemic episodes, eating partial meals, NAD.  Hypothyroidism: Patient has no history thyroid disease, was not on any thyroid supplements, subclinical with low-normal FT4, lethargic, started on synthroid 25 mcg po daily, FT4 improved to 1.2.  CHF: on medications, stable, monitored.  HTN: Controlled,  on antihypertensive medications.  Parkinsons: on meds, monitored.  CKD: Monitor labs/BMP      Kelsie Reece MD  Cell: [phone]  Office: [phone]     Assessment  DMT2: 71y Male with DM T2 with hyperglycemia, A1C 6.4%, was on insulin at home, now on low-dose basal insulin and coverage, blood sugars are stable and trending within acceptable range, no hypoglycemic episodes,  eating partial meals, NAD.  Hypothyroidism: Patient has no history thyroid disease, was not on any thyroid supplements, subclinical with low-normal FT4, lethargic, started on synthroid 25 mcg po daily, FT4 improved to 1.2.  CHF: on medications, stable, monitored.  HTN: Controlled,  on antihypertensive medications.  Parkinsons: on meds, monitored.  CKD: Monitor labs/BMP      Kelsie Reece MD  Cell: [phone]  Office: [phone]

## 2021-11-17 NOTE — PROGRESS NOTE ADULT - SUBJECTIVE AND OBJECTIVE BOX
[facility]      Seen earlier today, and examined.  - Today, patient is without complaints.           *****MEDICATIONS: Current medication reviewed and documented.    MEDICATIONS  (STANDING):  ascorbic acid 500 milliGRAM(s) Oral daily  atorvastatin 80 milliGRAM(s) Oral at bedtime  carbidopa/levodopa  25/100 2.5 Tablet(s) Oral <User Schedule>  carbidopa/levodopa  25/100 2 Tablet(s) Oral <User Schedule>  chlorhexidine 4% Liquid 1 Application(s) Topical <User Schedule>  cholecalciferol 1000 Unit(s) Oral daily  dextrose 5%. 1000 milliLiter(s) (100 mL/Hr) IV Continuous <Continuous>  dextrose 5%. 1000 milliLiter(s) (50 mL/Hr) IV Continuous <Continuous>  dextrose 50% Injectable 25 Gram(s) IV Push once  dextrose 50% Injectable 12.5 Gram(s) IV Push once  dextrose 50% Injectable 25 Gram(s) IV Push once  epoetin mari-epbx (RETACRIT) Injectable 19622 Unit(s) IV Push once  folic acid 1 milliGRAM(s) Oral daily  glucagon  Injectable 1 milliGRAM(s) IntraMuscular once  insulin glargine Injectable (LANTUS) 6 Unit(s) SubCutaneous at bedtime  insulin lispro (ADMELOG) corrective regimen sliding scale   SubCutaneous three times a day before meals  insulin lispro (ADMELOG) corrective regimen sliding scale   SubCutaneous at bedtime  levothyroxine 25 MICROGram(s) Oral daily  memantine 5 milliGRAM(s) Oral at bedtime  metoprolol tartrate 25 milliGRAM(s) Oral two times a day  midodrine 20 milliGRAM(s) Oral <User Schedule>  polyethylene glycol 3350 17 Gram(s) Oral two times a day  QUEtiapine 12.5 milliGRAM(s) Oral two times a day  senna Syrup 10 milliLiter(s) Oral at bedtime  trihexyphenidyl 2 milliGRAM(s) Oral three times a day    MEDICATIONS  (PRN):  acetaminophen     Tablet .. 650 milliGRAM(s) Oral every 6 hours PRN Mild Pain (1 - 3)  albuterol/ipratropium for Nebulization 3 milliLiter(s) Nebulizer every 6 hours PRN Shortness of Breath and/or Wheezing  guaiFENesin Oral Liquid (Sugar-Free) 200 milliGRAM(s) Oral every 6 hours PRN Cough  QUEtiapine 25 milliGRAM(s) Oral every 6 hours PRN Agitation  sodium chloride 0.9% lock flush 10 milliLiter(s) IV Push every 1 hour PRN Pre/post blood products, medications, blood draw, and to maintain line patency          ***** VITAL SIGNS:  T(F): 98.4 (21 @ 12:47), Max: 98.4 (21 @ 12:47)  HR: 95 (21 @ 18:03) (80 - 105)  BP: 133/79 (21 @ 18:03) (133/79 - 156/82)  RR: 18 (21 @ 18:03) (18 - 19)  SpO2: 98% (21 @ 18:03) (93% - 100%)  Wt(kg): --  ,   I&O's Summary    2021 07:  -  2021 07:00  --------------------------------------------------------  IN: 500 mL / OUT: 1600 mL / NET: -1100 mL    2021 07:01  -  2021 04:39  --------------------------------------------------------  IN: 580 mL / OUT: 0 mL / NET: 580 mL             *****PHYSICAL EXAM:   alert oriented x 3 attention comprehension are fair.  Able to name, repeat.   EOmi fundi not visualized   no nystagmus VFF to confrontation  Tongue is midline  Palate elevates symmetrically   Moving all 4 ext spontaneously no drift appreciated    Gait not assessed.            *****LAB AND IMAGIN.9    7.14  )-----------( 299      ( 2021 08:13 )             25.5               11    135  |  95<L>  |  38<H>  ----------------------------<  141<H>  3.4<L>   |  26  |  4.43<H>    Ca    9.0      2021 08:13  Phos  3.9     11-  Mg     2.1     -17                           [All pertinent recent Imaging/Reports reviewed]           *****A S S E S S M E N T   A N D   P L A N :      Excerpt from H&P,"  72yo M w/ PMHx of CAD (s/p CABG ), CKD (unknown stage), DM2, Parkinson's Disease, HTN, depression presents with bilateral leg swelling, patient's daughter in-law at bedside Elsy Quintero ([address] [place] [address] Nurse) provides the history, the daughter reports the patient is a poor historian, unable to remember having conversations with others and likely cannot weigh risk/benefits of medical conditions, she reports that he has had bilateral lower extremity swelling for the past few months, but over the past 2 weeks it has significantly worsened, he has further difficulty ambulating due to swelling, his outpatient provider attempted to manage with 20mg lasix and then increased to 40mg lasix but the patient never took the increased dose, his symptoms ar persistent throughout the day and are extremely severe, he has developed wounds of the legs with weeping of fluid due to the swelling, she reports that the patient is frequently non-compliant with medications and medical management, he lives at home with his son and daughter in law, he denies chest pain, shortness of breath, fever/chills, cough, sputum, in the ED, he was tachycardic but hemodynamically stable, afebrile, saturating well on room air, labs were significant for elevated BNP, elevated creatinine, imaging showed moderate left pleural effusion, patient was admitted to general medicine for further management          Problem/Recommendations 1:ams of unclear etiology   likely related to multiple metabolic derangements related to uremia  sleep wake cycle disruption and poor nutritional intake  would regulate sleep wake cycle  check b12/b1 folate/tsh /ammonia wnl   thiamine 500 iv q 8 x 2 days after checking vitamin b1 level   nutrition consult for severe protein caloric malnutrition   discussed with elsy( daughter in law), agreeing to start namenda 5 mg qhs probable early lewy body dementia related paranoia and agitation   plan likely needs to adjust dose of antipsychotics, however avoid typical antipsychotics  elsy denies any alcohol intake at all.     lowered seroquel 12.5 due to sedation         Problem/Recommendations 2: weakness   r/o orthostatic hypotension   pt shaking on standing up    prob deconditioning   pt eval   oob to chair as tolerated    Problem/Recommendations 3: parkinsons   continue current regimen   sinemet 2. 5 twice a day and 2 mg qhs     Thank you for allowing me to participate in the care of this patient. Will continue to follow patient periodically. Please do not hesitate to call me if you have any  questions or if there has been a change in patients neurological status     ________________  Lily Fang MD  [facility] ([facility]  [phone]      33 minutes spent on total encounter; more than 50 % of the visit was  spent counseling about plan of care, compliance to diet/exercise and medication regimen and or  coordinating care by the attending physician.      It is advised that stroke patients follow up with ZACH Ablarran @ [phone] in 1- 2 weeks.   Others please follow up with Dr. Michael Nissenbaum [phone] [facility]      Seen earlier today, and examined.  - Today, patient is without complaints.           *****MEDICATIONS: Current medication reviewed and documented.    MEDICATIONS  (STANDING):  ascorbic acid 500 milliGRAM(s) Oral daily  atorvastatin 80 milliGRAM(s) Oral at bedtime  carbidopa/levodopa  25/100 2.5 Tablet(s) Oral <User Schedule>  carbidopa/levodopa  25/100 2 Tablet(s) Oral <User Schedule>  chlorhexidine 4% Liquid 1 Application(s) Topical <User Schedule>  cholecalciferol 1000 Unit(s) Oral daily  dextrose 5%. 1000 milliLiter(s) (100 mL/Hr) IV Continuous <Continuous>  dextrose 5%. 1000 milliLiter(s) (50 mL/Hr) IV Continuous <Continuous>  dextrose 50% Injectable 25 Gram(s) IV Push once  dextrose 50% Injectable 12.5 Gram(s) IV Push once  dextrose 50% Injectable 25 Gram(s) IV Push once  epoetin mari-epbx (RETACRIT) Injectable 47646 Unit(s) IV Push once  folic acid 1 milliGRAM(s) Oral daily  glucagon  Injectable 1 milliGRAM(s) IntraMuscular once  insulin glargine Injectable (LANTUS) 6 Unit(s) SubCutaneous at bedtime  insulin lispro (ADMELOG) corrective regimen sliding scale   SubCutaneous three times a day before meals  insulin lispro (ADMELOG) corrective regimen sliding scale   SubCutaneous at bedtime  levothyroxine 25 MICROGram(s) Oral daily  memantine 5 milliGRAM(s) Oral at bedtime  metoprolol tartrate 25 milliGRAM(s) Oral two times a day  midodrine 20 milliGRAM(s) Oral <User Schedule>  polyethylene glycol 3350 17 Gram(s) Oral two times a day  QUEtiapine 12.5 milliGRAM(s) Oral two times a day  senna Syrup 10 milliLiter(s) Oral at bedtime  trihexyphenidyl 2 milliGRAM(s) Oral three times a day    MEDICATIONS  (PRN):  acetaminophen     Tablet .. 650 milliGRAM(s) Oral every 6 hours PRN Mild Pain (1 - 3)  albuterol/ipratropium for Nebulization 3 milliLiter(s) Nebulizer every 6 hours PRN Shortness of Breath and/or Wheezing  guaiFENesin Oral Liquid (Sugar-Free) 200 milliGRAM(s) Oral every 6 hours PRN Cough  QUEtiapine 25 milliGRAM(s) Oral every 6 hours PRN Agitation  sodium chloride 0.9% lock flush 10 milliLiter(s) IV Push every 1 hour PRN Pre/post blood products, medications, blood draw, and to maintain line patency          ***** VITAL SIGNS:  T(F): 98.4 (21 @ 12:47), Max: 98.4 (21 @ 12:47)  HR: 95 (21 @ 18:03) (80 - 105)  BP: 133/79 (21 @ 18:03) (133/79 - 156/82)  RR: 18 (21 @ 18:03) (18 - 19)  SpO2: 98% (21 @ 18:03) (93% - 100%)  Wt(kg): --  ,   I&O's Summary    2021 07:  -  2021 07:00  --------------------------------------------------------  IN: 500 mL / OUT: 1600 mL / NET: -1100 mL    2021 07:01  -  2021 04:39  --------------------------------------------------------  IN: 580 mL / OUT: 0 mL / NET: 580 mL             *****PHYSICAL EXAM:   alert confused more cooperative    less agitated   still with delusions    EOmi fundi not visualized   no nystagmus VFF to confrontation  Tongue is midline  Palate elevates symmetrically   Moving all 4 ext spontaneously no drift appreciated    Gait not assessed.            *****LAB AND IMAGIN.9    7.14  )-----------( 299      ( 2021 08:13 )             25.5               11-17    135  |  95<L>  |  38<H>  ----------------------------<  141<H>  3.4<L>   |  26  |  4.43<H>    Ca    9.0      2021 08:13  Phos  3.9     11-  Mg     2.1     -                           [All pertinent recent Imaging/Reports reviewed]           *****A S S E S S M E N T   A N D   P L A N :      Excerpt from H&P,"  70yo M w/ PMHx of CAD (s/p CABG ), CKD (unknown stage), DM2, Parkinson's Disease, HTN, depression presents with bilateral leg swelling, patient's daughter in-law at bedside Elsy Quintero ([address] [place] [address] Nurse) provides the history, the daughter reports the patient is a poor historian, unable to remember having conversations with others and likely cannot weigh risk/benefits of medical conditions, she reports that he has had bilateral lower extremity swelling for the past few months, but over the past 2 weeks it has significantly worsened, he has further difficulty ambulating due to swelling, his outpatient provider attempted to manage with 20mg lasix and then increased to 40mg lasix but the patient never took the increased dose, his symptoms ar persistent throughout the day and are extremely severe, he has developed wounds of the legs with weeping of fluid due to the swelling, she reports that the patient is frequently non-compliant with medications and medical management, he lives at home with his son and daughter in law, he denies chest pain, shortness of breath, fever/chills, cough, sputum, in the ED, he was tachycardic but hemodynamically stable, afebrile, saturating well on room air, labs were significant for elevated BNP, elevated creatinine, imaging showed moderate left pleural effusion, patient was admitted to general medicine for further management          Problem/Recommendations 1:ams of unclear etiology   likely related to multiple metabolic derangements related to uremia  sleep wake cycle disruption and poor nutritional intake  would regulate sleep wake cycle  check b12/b1 folate/tsh /ammonia wnl   thiamine 500 iv q 8 x 2 days after checking vitamin b1 level   nutrition consult for severe protein caloric malnutrition   discussed with elsy( daughter in law), agreeing to start namenda 5 mg qhs probable early lewy body dementia related paranoia and agitation   plan likely needs to adjust dose of antipsychotics, however avoid typical antipsychotics  elsy denies any alcohol intake at all.     lowered seroquel 12.5 due to sedation         Problem/Recommendations 2: weakness   r/o orthostatic hypotension   pt shaking on standing up    prob deconditioning   pt eval   oob to chair as tolerated    Problem/Recommendations 3: parkinsons   continue current regimen   sinemet 2. 5 twice a day and 2 mg qhs     Thank you for allowing me to participate in the care of this patient. Will continue to follow patient periodically. Please do not hesitate to call me if you have any  questions or if there has been a change in patients neurological status     ________________  Lily Fang MD  [facility]rological [facility] ([facility]  [phone]      33 minutes spent on total encounter; more than 50 % of the visit was  spent counseling about plan of care, compliance to diet/exercise and medication regimen and or  coordinating care by the attending physician.      It is advised that stroke patients follow up with ZACH Albarran @ [phone] in 1- 2 weeks.   Others please follow up with Dr. Michael Nissenbaum [phone]

## 2021-11-17 NOTE — PROGRESS NOTE ADULT - ASSESSMENT
ASSESSMENT AND PLAN:     - please obtain duplex of fistula to assess for hematoma  - please keep LUE elevated and ACE wrapped for swelling. ACE wrapped will be changed on 11/17 by Vascular Surgery.   - vascular to follow based on AVF Duplex results    Vascular Surgery, [phone]

## 2021-11-17 NOTE — PROGRESS NOTE ADULT - SUBJECTIVE AND OBJECTIVE BOX
Patient is a 71y old  Male who presents with a chief complaint of leg swelling (17 Nov 2021 14:35)      INTERVAL HPI/OVERNIGHT EVENTS: noted  pt seen and examined this am   events noted      Vital Signs Last 24 Hrs  T(C): 36.9 (17 Nov 2021 12:47), Max: 36.9 (17 Nov 2021 12:47)  T(F): 98.4 (17 Nov 2021 12:47), Max: 98.4 (17 Nov 2021 12:47)  HR: 95 (17 Nov 2021 18:03) (80 - 105)  BP: 133/79 (17 Nov 2021 18:03) (133/79 - 156/82)  BP(mean): --  RR: 18 (17 Nov 2021 18:03) (18 - 19)  SpO2: 98% (17 Nov 2021 18:03) (93% - 100%)    acetaminophen     Tablet .. 650 milliGRAM(s) Oral every 6 hours PRN  albuterol/ipratropium for Nebulization 3 milliLiter(s) Nebulizer every 6 hours PRN  ascorbic acid 500 milliGRAM(s) Oral daily  atorvastatin 80 milliGRAM(s) Oral at bedtime  carbidopa/levodopa  25/100 2.5 Tablet(s) Oral <User Schedule>  carbidopa/levodopa  25/100 2 Tablet(s) Oral <User Schedule>  chlorhexidine 4% Liquid 1 Application(s) Topical <User Schedule>  cholecalciferol 1000 Unit(s) Oral daily  dextrose 5%. 1000 milliLiter(s) IV Continuous <Continuous>  dextrose 5%. 1000 milliLiter(s) IV Continuous <Continuous>  dextrose 50% Injectable 25 Gram(s) IV Push once  dextrose 50% Injectable 12.5 Gram(s) IV Push once  dextrose 50% Injectable 25 Gram(s) IV Push once  folic acid 1 milliGRAM(s) Oral daily  glucagon  Injectable 1 milliGRAM(s) IntraMuscular once  guaiFENesin Oral Liquid (Sugar-Free) 200 milliGRAM(s) Oral every 6 hours PRN  insulin glargine Injectable (LANTUS) 6 Unit(s) SubCutaneous at bedtime  insulin lispro (ADMELOG) corrective regimen sliding scale   SubCutaneous three times a day before meals  insulin lispro (ADMELOG) corrective regimen sliding scale   SubCutaneous at bedtime  levothyroxine 25 MICROGram(s) Oral daily  memantine 5 milliGRAM(s) Oral at bedtime  metoprolol tartrate 25 milliGRAM(s) Oral two times a day  midodrine 20 milliGRAM(s) Oral <User Schedule>  polyethylene glycol 3350 17 Gram(s) Oral two times a day  QUEtiapine 12.5 milliGRAM(s) Oral two times a day  QUEtiapine 25 milliGRAM(s) Oral every 6 hours PRN  senna Syrup 10 milliLiter(s) Oral at bedtime  sodium chloride 0.9% lock flush 10 milliLiter(s) IV Push every 1 hour PRN  trihexyphenidyl 2 milliGRAM(s) Oral three times a day      PHYSICAL EXAM:  GENERAL: NAD,   EYES: conjunctiva and sclera clear  ENMT: Moist mucous membranes  NECK: Supple, No JVD, Normal thyroid  CHEST/LUNG: non labored, cta b/l  HEART: Regular rate and rhythm; No murmurs, rubs, or gallops  ABDOMEN: Soft, Nontender, Nondistended; Bowel sounds present  EXTREMITIES:  2+ Peripheral Pulses, No clubbing, cyanosis, or edema  LYMPH: No lymphadenopathy noted  SKIN: No rashes or lesions    Consultant(s) Notes Reviewed:  [x ] YES  [ ] NO  Care Discussed with Consultants/Other Providers [ x] YES  [ ] NO    LABS:                        7.9    7.14  )-----------( 299      ( 17 Nov 2021 08:13 )             25.5     11-17    135  |  95<L>  |  38<H>  ----------------------------<  141<H>  3.4<L>   |  26  |  4.43<H>    Ca    9.0      17 Nov 2021 08:13  Phos  3.9     11-17  Mg     2.1     11-17          CAPILLARY BLOOD GLUCOSE      POCT Blood Glucose.: 164 mg/dL (17 Nov 2021 22:19)  POCT Blood Glucose.: 131 mg/dL (17 Nov 2021 17:14)  POCT Blood Glucose.: 209 mg/dL (17 Nov 2021 11:24)  POCT Blood Glucose.: 161 mg/dL (17 Nov 2021 08:14)              RADIOLOGY & ADDITIONAL TESTS:    Imaging Personally Reviewed:  [x ] YES  [ ] NO

## 2021-11-17 NOTE — PROGRESS NOTE ADULT - ASSESSMENT
Assessment:  70yo M w/ PMHx of CAD (s/p CABG 2019), CKD (unknown stage), DM2, Parkinson's Disease, HTN, depression presents with bilateral leg swelling  ESRD   Severe LV dysfunction ;  A flutter   Confusion - alert at present     1 IR-  s/p left upper av access   with intact staples    2 Renal-   HD in am     3 CVS- BP controlled at preesent, on Midodrine (with hold parameters in place, SBP> 160),  Lopressor for heart rate control   May reduce the Midodrine to 10mg if the SBP remains elevated     4 Anemia - hgb lower today;  Retacrit at HD     5 Vasc - s/p  LUE AVF  with intact staples    DC planning to subacute with HD     Sayed [facility]olution  [phone]

## 2021-11-17 NOTE — PROGRESS NOTE ADULT - SUBJECTIVE AND OBJECTIVE BOX
Chief complaint  Patient is a 71y old  Male who presents with a chief complaint of leg swelling (17 Nov 2021 10:25)   Review of systems  Patient in bed, looks comfortable, no hypoglycemic episodes.    Labs and Fingersticks  CAPILLARY BLOOD GLUCOSE      POCT Blood Glucose.: 209 mg/dL (17 Nov 2021 11:24)  POCT Blood Glucose.: 161 mg/dL (17 Nov 2021 08:14)  POCT Blood Glucose.: 108 mg/dL (16 Nov 2021 21:38)  POCT Blood Glucose.: 125 mg/dL (16 Nov 2021 16:49)      Anion Gap, Serum: 14 (11-17 @ 08:13)  Anion Gap, Serum: 15 (11-16 @ 04:53)      Calcium, Total Serum: 9.0 (11-17 @ 08:13)  Calcium, Total Serum: 8.9 (11-16 @ 04:53)          11-17    135  |  95<L>  |  38<H>  ----------------------------<  141<H>  3.4<L>   |  26  |  4.43<H>    Ca    9.0      17 Nov 2021 08:13  Phos  3.9     11-17  Mg     2.1     11-17                          7.9    7.14  )-----------( 299      ( 17 Nov 2021 08:13 )             25.5     Medications  MEDICATIONS  (STANDING):  ascorbic acid 500 milliGRAM(s) Oral daily  atorvastatin 80 milliGRAM(s) Oral at bedtime  carbidopa/levodopa  25/100 2.5 Tablet(s) Oral <User Schedule>  carbidopa/levodopa  25/100 2 Tablet(s) Oral <User Schedule>  chlorhexidine 4% Liquid 1 Application(s) Topical <User Schedule>  cholecalciferol 1000 Unit(s) Oral daily  dextrose 5%. 1000 milliLiter(s) (50 mL/Hr) IV Continuous <Continuous>  dextrose 5%. 1000 milliLiter(s) (100 mL/Hr) IV Continuous <Continuous>  dextrose 50% Injectable 25 Gram(s) IV Push once  dextrose 50% Injectable 12.5 Gram(s) IV Push once  dextrose 50% Injectable 25 Gram(s) IV Push once  folic acid 1 milliGRAM(s) Oral daily  glucagon  Injectable 1 milliGRAM(s) IntraMuscular once  insulin glargine Injectable (LANTUS) 6 Unit(s) SubCutaneous at bedtime  insulin lispro (ADMELOG) corrective regimen sliding scale   SubCutaneous three times a day before meals  insulin lispro (ADMELOG) corrective regimen sliding scale   SubCutaneous at bedtime  levothyroxine 25 MICROGram(s) Oral daily  memantine 5 milliGRAM(s) Oral at bedtime  metoprolol tartrate 25 milliGRAM(s) Oral two times a day  midodrine 20 milliGRAM(s) Oral <User Schedule>  polyethylene glycol 3350 17 Gram(s) Oral two times a day  QUEtiapine 12.5 milliGRAM(s) Oral two times a day  senna Syrup 10 milliLiter(s) Oral at bedtime  trihexyphenidyl 2 milliGRAM(s) Oral three times a day      Physical Exam  General: Patient comfortable in bed  Vital Signs Last 12 Hrs  T(F): 98.4 (11-17-21 @ 12:47), Max: 98.4 (11-17-21 @ 12:47)  HR: 80 (11-17-21 @ 12:47) (80 - 105)  BP: 134/81 (11-17-21 @ 12:47) (134/81 - 156/82)  BP(mean): --  RR: 18 (11-17-21 @ 12:47) (18 - 19)  SpO2: 100% (11-17-21 @ 12:47) (93% - 100%)  Neck: No palpable thyroid nodules.  CVS: S1S2, No murmurs  Respiratory: No wheezing, no crepitations  GI: Abdomen soft, bowel sounds positive  Musculoskeletal:  edema lower extremities.   Skin: No skin rashes, no ecchymosis    Diagnostics    Free Thyroxine, Serum: AM Sched. Collection: 06-Nov-2021 06:00 (11-05 @ 13:18)  Free Thyroxine, Serum: AM Sched. Collection: 26-Oct-2021 06:00 (10-25 @ 11:45)  Thyroid Stimulating Hormone, Serum: AM Sched. Collection: 26-Oct-2021 06:00 (10-25 @ 11:45)  Cortisol AM, Serum: 08:00 (10-22 @ 12:47)  Free Thyroxine, Serum: AM Sched. Collection: 23-Oct-2021 06:00 (10-22 @ 12:43)  Thyroid Stimulating Hormone, Serum: AM Bridget. Collection: 23-Oct-2021 06:00 (10-22 @ 12:43)           Chief complaint  Patient is a 71y old  Male who presents with a chief complaint of leg swelling (17 Nov 2021 10:25)   Review of systems  Patient in bed, looks comfortable, no hypoglycemic episodes.    Labs and Fingersticks  CAPILLARY BLOOD GLUCOSE      POCT Blood Glucose.: 209 mg/dL (17 Nov 2021 11:24)  POCT Blood Glucose.: 161 mg/dL (17 Nov 2021 08:14)  POCT Blood Glucose.: 108 mg/dL (16 Nov 2021 21:38)  POCT Blood Glucose.: 125 mg/dL (16 Nov 2021 16:49)      Anion Gap, Serum: 14 (11-17 @ 08:13)  Anion Gap, Serum: 15 (11-16 @ 04:53)      Calcium, Total Serum: 9.0 (11-17 @ 08:13)  Calcium, Total Serum: 8.9 (11-16 @ 04:53)          11-17    135  |  95<L>  |  38<H>  ----------------------------<  141<H>  3.4<L>   |  26  |  4.43<H>    Ca    9.0      17 Nov 2021 08:13  Phos  3.9     11-17  Mg     2.1     11-17                          7.9    7.14  )-----------( 299      ( 17 Nov 2021 08:13 )             25.5     Medications  MEDICATIONS  (STANDING):  ascorbic acid 500 milliGRAM(s) Oral daily  atorvastatin 80 milliGRAM(s) Oral at bedtime  carbidopa/levodopa  25/100 2.5 Tablet(s) Oral <User Schedule>  carbidopa/levodopa  25/100 2 Tablet(s) Oral <User Schedule>  chlorhexidine 4% Liquid 1 Application(s) Topical <User Schedule>  cholecalciferol 1000 Unit(s) Oral daily  dextrose 5%. 1000 milliLiter(s) (50 mL/Hr) IV Continuous <Continuous>  dextrose 5%. 1000 milliLiter(s) (100 mL/Hr) IV Continuous <Continuous>  dextrose 50% Injectable 25 Gram(s) IV Push once  dextrose 50% Injectable 12.5 Gram(s) IV Push once  dextrose 50% Injectable 25 Gram(s) IV Push once  folic acid 1 milliGRAM(s) Oral daily  glucagon  Injectable 1 milliGRAM(s) IntraMuscular once  insulin glargine Injectable (LANTUS) 6 Unit(s) SubCutaneous at bedtime  insulin lispro (ADMELOG) corrective regimen sliding scale   SubCutaneous three times a day before meals  insulin lispro (ADMELOG) corrective regimen sliding scale   SubCutaneous at bedtime  levothyroxine 25 MICROGram(s) Oral daily  memantine 5 milliGRAM(s) Oral at bedtime  metoprolol tartrate 25 milliGRAM(s) Oral two times a day  midodrine 20 milliGRAM(s) Oral <User Schedule>  polyethylene glycol 3350 17 Gram(s) Oral two times a day  QUEtiapine 12.5 milliGRAM(s) Oral two times a day  senna Syrup 10 milliLiter(s) Oral at bedtime  trihexyphenidyl 2 milliGRAM(s) Oral three times a day      Physical Exam  General: Patient comfortable in bed  Vital Signs Last 12 Hrs  T(F): 98.4 (11-17-21 @ 12:47), Max: 98.4 (11-17-21 @ 12:47)  HR: 80 (11-17-21 @ 12:47) (80 - 105)  BP: 134/81 (11-17-21 @ 12:47) (134/81 - 156/82)  BP(mean): --  RR: 18 (11-17-21 @ 12:47) (18 - 19)  SpO2: 100% (11-17-21 @ 12:47) (93% - 100%)  Neck: No palpable thyroid nodules.  CVS: S1S2, No murmurs  Respiratory: No wheezing, no crepitations  GI: Abdomen soft, bowel sounds positive  Musculoskeletal:  edema lower extremities.   Skin: No skin rashes, no ecchymosis    Diagnostics    Free Thyroxine, Serum: AM Sched. Collection: 06-Nov-2021 06:00 (11-05 @ 13:18)  Free Thyroxine, Serum: AM Sched. Collection: 26-Oct-2021 06:00 (10-25 @ 11:45)  Thyroid Stimulating Hormone, Serum: AM Sched. Collection: 26-Oct-2021 06:00 (10-25 @ 11:45)  Cortisol AM, Serum: 08:00 (10-22 @ 12:47)  Free Thyroxine, Serum: AM Sched. Collection: 23-Oct-2021 06:00 (10-22 @ 12:43)  Thyroid Stimulating Hormone, Serum: AM Bridget. Collection: 23-Oct-2021 06:00 (10-22 @ 12:43)

## 2021-11-17 NOTE — CHART NOTE - NSCHARTNOTEFT_GEN_A_CORE
Nutrition Follow Up Note  Patient seen for: malnutrition follow-up. Chart reviewed, events noted.     "70yo M w/ PMHx of CAD (s/p CABG ), CKD (unknown stage), DM2, Parkinson's Disease, HTN, depression presents with new onset acute heart failure exacerbation, NSTEMI, started on hep gtt, developed bl RP bleed, acute anemia. sp MICU course for hemorrhagic shock, 10/28 sp IR embolization l4 and l5 lumbar artery. for permacath and AVF."   "acute systolic and diastolic heart failure, improved. NSTEMI- conservative mgmt dt renal function and anemia-bld loss. ESRD, new HD. acute hypoxic resp failure, improved hemorrhagic shock, left RP hematoma, acute blood loss anemia"    Source: [] Patient       [x] Medical Record        [] RN        [] Family at bedside       [] Other:    -If unable to interview patient: [] Trach/Vent/BiPAP  [x] Disoriented/confused/inappropriate to interview    Diet Order:   Diet, Regular (21)    - Is current order appropriate/adequate? [x] Yes  []  No:     - PO intake :   [] >75%  Adequate    [x] 50-75%  Fair       [] <50%  Poor    - Nutrition-related concerns:      - Pt remains on HD      - s/p MBS , SLP rec'd Regular Diet      - Pt continues with delirium      - Pt ordered for ascorbic acid, cholecalciferol, and folic acid    GI: Per chart, no nausea, vomiting, constipation, diarrhea.  Last BM  per chart.   Bowel Regimen? [x] Yes (Miralax, senna)      Weights:   Daily Weight in k (11-17), 89 (11-16), 84.9 (11-16), 84.6 (11-15), 87.3 (11-13), 88.9 (11-13), 86.9 (11-12)  Drug Dosing Weight (kg): 96.4 (27 Oct 2021 21:30)  daily weights noted; weight changes likely secondary to fluid shifts from HD; will continue to monitor trends as able    MEDICATIONS  (STANDING):  ascorbic acid 500 milliGRAM(s) Oral daily  atorvastatin 80 milliGRAM(s) Oral at bedtime  carbidopa/levodopa  25/100 2.5 Tablet(s) Oral <User Schedule>  carbidopa/levodopa  25/100 2 Tablet(s) Oral <User Schedule>  chlorhexidine 4% Liquid 1 Application(s) Topical <User Schedule>  cholecalciferol 1000 Unit(s) Oral daily  dextrose 5%. 1000 milliLiter(s) (50 mL/Hr) IV Continuous <Continuous>  dextrose 5%. 1000 milliLiter(s) (100 mL/Hr) IV Continuous <Continuous>  dextrose 50% Injectable 25 Gram(s) IV Push once  dextrose 50% Injectable 25 Gram(s) IV Push once  dextrose 50% Injectable 12.5 Gram(s) IV Push once  folic acid 1 milliGRAM(s) Oral daily  glucagon  Injectable 1 milliGRAM(s) IntraMuscular once  insulin glargine Injectable (LANTUS) 6 Unit(s) SubCutaneous at bedtime  insulin lispro (ADMELOG) corrective regimen sliding scale   SubCutaneous three times a day before meals  insulin lispro (ADMELOG) corrective regimen sliding scale   SubCutaneous at bedtime  levothyroxine 25 MICROGram(s) Oral daily  memantine 5 milliGRAM(s) Oral at bedtime  metoprolol tartrate 25 milliGRAM(s) Oral two times a day  midodrine 20 milliGRAM(s) Oral <User Schedule>  polyethylene glycol 3350 17 Gram(s) Oral two times a day  QUEtiapine 12.5 milliGRAM(s) Oral two times a day  senna Syrup 10 milliLiter(s) Oral at bedtime  trihexyphenidyl 2 milliGRAM(s) Oral three times a day    MEDICATIONS  (PRN):  acetaminophen     Tablet .. 650 milliGRAM(s) Oral every 6 hours PRN Mild Pain (1 - 3)  albuterol/ipratropium for Nebulization 3 milliLiter(s) Nebulizer every 6 hours PRN Shortness of Breath and/or Wheezing  guaiFENesin Oral Liquid (Sugar-Free) 200 milliGRAM(s) Oral every 6 hours PRN Cough  QUEtiapine 25 milliGRAM(s) Oral every 6 hours PRN Agitation  sodium chloride 0.9% lock flush 10 milliLiter(s) IV Push every 1 hour PRN Pre/post blood products, medications, blood draw, and to maintain line patency    Pertinent Labs:    @ 08:13: Na 135,  BUN 38<H>,  Cr 4.43<H>,  <H>,  K+ 3.4<L>,  Phos 3.9,  Mg 2.1    A1C with Estimated Average Glucose Result: 6.4 % (10-22-21 @ 08:56)    Finger Sticks:  POCT Blood Glucose.: 209 mg/dL ( @ 11:24)  POCT Blood Glucose.: 161 mg/dL ( @ 08:14)  POCT Blood Glucose.: 108 mg/dL ( @ 21:38)  POCT Blood Glucose.: 125 mg/dL ( @ 16:49)      Skin per nursing documentation: Stage II/Suspected DTI to R buttocks  Edema per nursing documentation: +2 bilateral legs, +3 Left arm    Estimated Needs:   [x] no change since previous assessment  Estimated Energy Needs: 5776-3399 kcal (30-35 kcal/kg)  Estimated Protein Needs:  gm protein (1.2-1.4 g/kg)  Defer fluid needs to team  based on IBW 78 kg    Previous Nutrition Diagnosis: Increased Nutrient Needs, Food and Nutrition Related Knowledge Deficit, Severe acute on chronic malnutrition  Nutrition Diagnosis is: [x] ongoing, addressed with micronutrient supplementation, previously on EN feeds, now transitioned to PO diet    Nutrition Care Plan:  [x] In Progress  [] Achieved  [] Not applicable    New Nutrition Diagnosis: [x] Not applicable    Nutrition Interventions:     Education Provided   [] Yes:  [x] No: Pt with AMS, not appropriate    Recommendations:      1) Continue PO Regular Diet as medically appropriate; defer texture to SLP/Team  2) Would recommend addition of Nephro-celeste supplementation to help meet nutrient needs  3) Consider removing 500 mg Vit C daily for contraindication with renal disease  4) Consider adding Nepro oral nutrition supplement 1x/day to optimize nutritional intake  5) Please continue to assist with meals as able    Monitoring and Evaluation:   Continue to monitor nutritional intake, tolerance to diet prescription, weights, labs, skin integrity    RD remains available upon request and will follow up per protocol  Maribel Gifford, DONN, CDN Pager #[phone] Nutrition Follow Up Note  Patient seen for: malnutrition follow-up. Chart reviewed, events noted.     "70yo M w/ PMHx of CAD (s/p CABG ), CKD (unknown stage), DM2, Parkinson's Disease, HTN, depression presents with new onset acute heart failure exacerbation, NSTEMI, started on hep gtt, developed bl RP bleed, acute anemia. sp MICU course for hemorrhagic shock, 10/28 sp IR embolization l4 and l5 lumbar artery. for permacath and AVF."   "acute systolic and diastolic heart failure, improved. NSTEMI- conservative mgmt dt renal function and anemia-bld loss. ESRD, new HD. acute hypoxic resp failure, improved hemorrhagic shock, left RP hematoma, acute blood loss anemia"    Source: [] Patient       [x] Medical Record        [] RN        [] Family at bedside       [] Other:    -If unable to interview patient: [] Trach/Vent/BiPAP  [x] Disoriented/confused/inappropriate to interview    Diet Order:   Diet, Regular (21)    - Is current order appropriate/adequate? [x] Yes  []  No:     - PO intake :   [] >75%  Adequate    [x] 50-75%  Fair       [] <50%  Poor    - Nutrition-related concerns:      - Pt remains on HD      - s/p MBS , SLP rec'd Regular Diet      - Pt continues with delirium      - Pt ordered for ascorbic acid, cholecalciferol, and folic acid    GI: Per chart, no nausea, vomiting, constipation, diarrhea.  Last BM  per chart.   Bowel Regimen? [x] Yes (Miralax, senna)      Weights:   Daily Weight in k (11-17), 89 (11-16), 84.9 (11-16), 84.6 (11-15), 87.3 (11-13), 88.9 (11-13), 86.9 (11-12)  Drug Dosing Weight (kg): 96.4 (27 Oct 2021 21:30)  daily weights noted; weight changes likely secondary to fluid shifts from HD; will continue to monitor trends as able    MEDICATIONS  (STANDING):  ascorbic acid 500 milliGRAM(s) Oral daily  atorvastatin 80 milliGRAM(s) Oral at bedtime  carbidopa/levodopa  25/100 2.5 Tablet(s) Oral <User Schedule>  carbidopa/levodopa  25/100 2 Tablet(s) Oral <User Schedule>  chlorhexidine 4% Liquid 1 Application(s) Topical <User Schedule>  cholecalciferol 1000 Unit(s) Oral daily  dextrose 5%. 1000 milliLiter(s) (50 mL/Hr) IV Continuous <Continuous>  dextrose 5%. 1000 milliLiter(s) (100 mL/Hr) IV Continuous <Continuous>  dextrose 50% Injectable 25 Gram(s) IV Push once  dextrose 50% Injectable 25 Gram(s) IV Push once  dextrose 50% Injectable 12.5 Gram(s) IV Push once  folic acid 1 milliGRAM(s) Oral daily  glucagon  Injectable 1 milliGRAM(s) IntraMuscular once  insulin glargine Injectable (LANTUS) 6 Unit(s) SubCutaneous at bedtime  insulin lispro (ADMELOG) corrective regimen sliding scale   SubCutaneous three times a day before meals  insulin lispro (ADMELOG) corrective regimen sliding scale   SubCutaneous at bedtime  levothyroxine 25 MICROGram(s) Oral daily  memantine 5 milliGRAM(s) Oral at bedtime  metoprolol tartrate 25 milliGRAM(s) Oral two times a day  midodrine 20 milliGRAM(s) Oral <User Schedule>  polyethylene glycol 3350 17 Gram(s) Oral two times a day  QUEtiapine 12.5 milliGRAM(s) Oral two times a day  senna Syrup 10 milliLiter(s) Oral at bedtime  trihexyphenidyl 2 milliGRAM(s) Oral three times a day    MEDICATIONS  (PRN):  acetaminophen     Tablet .. 650 milliGRAM(s) Oral every 6 hours PRN Mild Pain (1 - 3)  albuterol/ipratropium for Nebulization 3 milliLiter(s) Nebulizer every 6 hours PRN Shortness of Breath and/or Wheezing  guaiFENesin Oral Liquid (Sugar-Free) 200 milliGRAM(s) Oral every 6 hours PRN Cough  QUEtiapine 25 milliGRAM(s) Oral every 6 hours PRN Agitation  sodium chloride 0.9% lock flush 10 milliLiter(s) IV Push every 1 hour PRN Pre/post blood products, medications, blood draw, and to maintain line patency    Pertinent Labs:    @ 08:13: Na 135,  BUN 38<H>,  Cr 4.43<H>,  <H>,  K+ 3.4<L>,  Phos 3.9,  Mg 2.1    A1C with Estimated Average Glucose Result: 6.4 % (10-22-21 @ 08:56)    Finger Sticks:  POCT Blood Glucose.: 209 mg/dL ( @ 11:24)  POCT Blood Glucose.: 161 mg/dL ( @ 08:14)  POCT Blood Glucose.: 108 mg/dL ( @ 21:38)  POCT Blood Glucose.: 125 mg/dL ( @ 16:49)      Skin per nursing documentation: Stage II/Suspected DTI to R buttocks  Edema per nursing documentation: +2 bilateral legs, +3 Left arm    Estimated Needs:   [x] no change since previous assessment  Estimated Energy Needs: 9545-6649 kcal (30-35 kcal/kg)  Estimated Protein Needs:  gm protein (1.2-1.4 g/kg)  Defer fluid needs to team  based on IBW 78 kg    Previous Nutrition Diagnosis: Increased Nutrient Needs, Food and Nutrition Related Knowledge Deficit, Severe acute on chronic malnutrition  Nutrition Diagnosis is: [x] ongoing, addressed with micronutrient supplementation, previously on EN feeds, now transitioned to PO diet    Nutrition Care Plan:  [x] In Progress  [] Achieved  [] Not applicable    New Nutrition Diagnosis: [x] Not applicable    Nutrition Interventions:     Education Provided   [] Yes:  [x] No: Pt with AMS, not appropriate    Recommendations:      1) Continue PO Regular Diet as medically appropriate; defer texture to SLP/Team  2) Would recommend addition of Nephro-celeste supplementation to help meet nutrient needs  3) Consider removing 500 mg Vit C daily for contraindication with renal disease  4) Consider adding Nepro oral nutrition supplement 1x/day to optimize nutritional intake  5) Please continue to assist with meals as able  6) Please address hypokalemia event as medically appropriate    Monitoring and Evaluation:   Continue to monitor nutritional intake, tolerance to diet prescription, weights, labs, skin integrity    RD remains available upon request and will follow up per protocol  Maribel Gifford RDN, CDN Pager #[phone]

## 2021-11-17 NOTE — PROGRESS NOTE ADULT - SUBJECTIVE AND OBJECTIVE BOX
SURGERY DAILY PROGRESS NOTE:     SUBJECTIVE/ROS: Vascular re-consulted for swelling of LUE s/p brachiocephalic fistula on . Patient seen and examined. Seems confused and disoriented. Does not complain of pain in left arm.      MEDICATIONS  (STANDING):  ascorbic acid 500 milliGRAM(s) Oral daily  atorvastatin 80 milliGRAM(s) Oral at bedtime  carbidopa/levodopa  25/100 2.5 Tablet(s) Oral <User Schedule>  carbidopa/levodopa  25/100 2 Tablet(s) Oral <User Schedule>  chlorhexidine 4% Liquid 1 Application(s) Topical <User Schedule>  cholecalciferol 1000 Unit(s) Oral daily  dextrose 5%. 1000 milliLiter(s) (100 mL/Hr) IV Continuous <Continuous>  dextrose 5%. 1000 milliLiter(s) (50 mL/Hr) IV Continuous <Continuous>  dextrose 50% Injectable 25 Gram(s) IV Push once  dextrose 50% Injectable 12.5 Gram(s) IV Push once  dextrose 50% Injectable 25 Gram(s) IV Push once  folic acid 1 milliGRAM(s) Oral daily  glucagon  Injectable 1 milliGRAM(s) IntraMuscular once  insulin glargine Injectable (LANTUS) 6 Unit(s) SubCutaneous at bedtime  insulin lispro (ADMELOG) corrective regimen sliding scale   SubCutaneous three times a day before meals  insulin lispro (ADMELOG) corrective regimen sliding scale   SubCutaneous at bedtime  levothyroxine 25 MICROGram(s) Oral daily  memantine 5 milliGRAM(s) Oral two times a day  metoprolol tartrate 25 milliGRAM(s) Oral two times a day  midodrine 20 milliGRAM(s) Oral <User Schedule>  polyethylene glycol 3350 17 Gram(s) Oral two times a day  QUEtiapine 25 milliGRAM(s) Oral two times a day  senna Syrup 10 milliLiter(s) Oral at bedtime  thiamine IVPB 500 milliGRAM(s) IV Intermittent three times a day  trihexyphenidyl 2 milliGRAM(s) Oral three times a day    MEDICATIONS  (PRN):  acetaminophen     Tablet .. 650 milliGRAM(s) Oral every 6 hours PRN Mild Pain (1 - 3)  albuterol/ipratropium for Nebulization 3 milliLiter(s) Nebulizer every 6 hours PRN Shortness of Breath and/or Wheezing  guaiFENesin Oral Liquid (Sugar-Free) 200 milliGRAM(s) Oral every 6 hours PRN Cough  QUEtiapine 25 milliGRAM(s) Oral every 6 hours PRN Agitation  sodium chloride 0.9% lock flush 10 milliLiter(s) IV Push every 1 hour PRN Pre/post blood products, medications, blood draw, and to maintain line patency      OBJECTIVE:    Vital Signs Last 24 Hrs  T(C): 36.6 (2021 03:38), Max: 36.6 (15 Nov 2021 10:37)  T(F): 97.8 (2021 03:38), Max: 97.8 (15 Nov 2021 10:37)  HR: 107 (2021 03:38) (93 - 107)  BP: 109/71 (2021 03:38) (109/71 - 134/82)  BP(mean): --  RR: 18 (2021 03:38) (18 - 18)  SpO2: 95% (2021 03:38) (94% - 96%)    I&O's Detail    15 Nov 2021 07:01  -  2021 07:00  --------------------------------------------------------  IN:    Oral Fluid: 240 mL  Total IN: 240 mL    OUT:  Total OUT: 0 mL    Total NET: 240 mL          Daily     Daily Weight in k.6 (15 Nov 2021 08:38)    LABS:                        6.9    x     )-----------( x        ( 2021 05:37 )             23.1     11-16    136  |  97  |  62<H>  ----------------------------<  145<H>  4.4   |  24  |  6.46<H>    Ca    8.9      2021 04:53      PHYSICAL EXAM:  General: AAOx3, NAD, lying comfortably in bed  Respiratory: nonlabored breathing  Abdomen: non-distended, soft, non-tender  Extremities: LUE with increased swelling; thrill palpable in LUE fistula, firmness noted to palpation on distal portion of AVF

## 2021-11-17 NOTE — PROGRESS NOTE ADULT - ASSESSMENT
Assessment  DMT2: 71y Male with DM T2 with hyperglycemia, A1C 6.4%, was on insulin at home, now on low-dose basal insulin and coverage, blood sugars are stable and trending within acceptable range, no hypoglycemic episodes. Patient is s/p AVF, eating partial meals, confused, NAD.  Hypothyroidism: Patient has no history thyroid disease, was not on any thyroid supplements, subclinical with low-normal FT4, lethargic, started on synthroid 25 mcg po daily, FT4 improved to 1.2.  CHF: on medications, stable, monitored.  HTN: Controlled,  on antihypertensive medications.  Parkinsons: on meds, monitored.  CKD: Monitor labs/BMP      Kelsie Reece MD  Cell: [phone]  Office: [phone]     Assessment  DMT2: 71y Male with DM T2 with hyperglycemia, A1C 6.4%, was on insulin at home, now on low-dose basal insulin and coverage, blood sugars are stable and trending within acceptable range, no  hypoglycemic episodes. Patient is s/p AVF, eating partial meals, confused, NAD.  Hypothyroidism: Patient has no history thyroid disease, was not on any thyroid supplements, subclinical with low-normal FT4, lethargic, started on synthroid 25 mcg po daily, FT4 improved to 1.2.  CHF: on medications, stable, monitored.  HTN: Controlled,  on antihypertensive medications.  Parkinsons: on meds, monitored.  CKD: Monitor labs/BMP      Kelsie Reece MD  Cell: [phone]  Office: [phone]

## 2021-11-17 NOTE — PROGRESS NOTE ADULT - SUBJECTIVE AND OBJECTIVE BOX
NEPHROLOGY-[facility] [phone]        Patient seen and examined in bed.  He was pleasantly confused         MEDICATIONS  (STANDING):  ascorbic acid 500 milliGRAM(s) Oral daily  atorvastatin 80 milliGRAM(s) Oral at bedtime  carbidopa/levodopa  25/100 2.5 Tablet(s) Oral <User Schedule>  carbidopa/levodopa  25/100 2 Tablet(s) Oral <User Schedule>  chlorhexidine 4% Liquid 1 Application(s) Topical <User Schedule>  cholecalciferol 1000 Unit(s) Oral daily  dextrose 5%. 1000 milliLiter(s) (50 mL/Hr) IV Continuous <Continuous>  dextrose 5%. 1000 milliLiter(s) (100 mL/Hr) IV Continuous <Continuous>  dextrose 50% Injectable 25 Gram(s) IV Push once  dextrose 50% Injectable 25 Gram(s) IV Push once  dextrose 50% Injectable 12.5 Gram(s) IV Push once  folic acid 1 milliGRAM(s) Oral daily  glucagon  Injectable 1 milliGRAM(s) IntraMuscular once  insulin glargine Injectable (LANTUS) 6 Unit(s) SubCutaneous at bedtime  insulin lispro (ADMELOG) corrective regimen sliding scale   SubCutaneous three times a day before meals  insulin lispro (ADMELOG) corrective regimen sliding scale   SubCutaneous at bedtime  levothyroxine 25 MICROGram(s) Oral daily  memantine 5 milliGRAM(s) Oral at bedtime  metoprolol tartrate 25 milliGRAM(s) Oral two times a day  midodrine 20 milliGRAM(s) Oral <User Schedule>  polyethylene glycol 3350 17 Gram(s) Oral two times a day  QUEtiapine 12.5 milliGRAM(s) Oral two times a day  senna Syrup 10 milliLiter(s) Oral at bedtime  trihexyphenidyl 2 milliGRAM(s) Oral three times a day      VITAL:  T(C): , Max: 36.9 (11-16-21 @ 20:15)  T(F): , Max: 98.5 (11-16-21 @ 20:15)  HR: 105 (11-17-21 @ 04:53)  BP: 156/82 (11-17-21 @ 04:53)  BP(mean): --  RR: 19 (11-17-21 @ 04:53)  SpO2: 93% (11-17-21 @ 04:53)  Wt(kg): --    I and O's:    11-16 @ 07:01  -  11-17 @ 07:00  --------------------------------------------------------  IN: 500 mL / OUT: 1600 mL / NET: -1100 mL    11-17 @ 07:01  -  11-17 @ 10:25  --------------------------------------------------------  IN: 100 mL / OUT: 0 mL / NET: 100 mL          PHYSICAL EXAM:    Constitutional: NAD  Neck:  No JVD  Respiratory: CTAB/L  Cardiovascular: S1 and S2  Gastrointestinal: BS+, soft, NT/ND  Extremities: No peripheral edema  Neurological: A/O x 3, no focal deficits  Psychiatric: Normal mood, normal affect  : No Javier  Skin: No rashes  Access: avg perm cath     LABS:                        7.9    7.14  )-----------( 299      ( 17 Nov 2021 08:13 )             25.5     11-17    135  |  95<L>  |  38<H>  ----------------------------<  141<H>  3.4<L>   |  26  |  4.43<H>    Ca    9.0      17 Nov 2021 08:13  Phos  3.9     11-17  Mg     2.1     11-17            Urine Studies:          RADIOLOGY & ADDITIONAL STUDIES:

## 2021-11-17 NOTE — PROGRESS NOTE ADULT - ASSESSMENT
72yo M w/ PMHx of CAD (s/p CABG 2019), CKD (unknown stage), DM2, Parkinson's Disease, HTN, depression presents with new onset acute heart failure exacerbation, NSTEMI, started on hep gtt, developed bl RP bleed, acute anemia. sp MICU course for hemorrhagic shock, 10/28 sp IR embolization l4 and l5 lumbar artery. for permacath and AVF.    # Acute systolic and diastolic heart failure, improved  # NSTEMI- conservative mgmt dt renal function and anemia-bld loss  # ESRD, new HD  # Atrial flutter  # acute hypoxic resp failure, improved  # hemorrhagic shock, left RP hematoma, acute blood loss anemia  # lupus anticoagulant +  Echo TTE mod to severe LV SD, hypokinesis anterior wall, not candidate for cath at this point  HD per renal  10/28 sp IR embolization l4 and l5 lumbar artery  on midodrine during dialysis  WBC stable, improved overall  sp permacath placement  sp L AVF  new left UE swelling, hematoma? concern for bleeding with drop in hgb, transfuse 1 unit prbc and 2 unit FFP  LUE doppler  vascular following    # DM2 (diabetes mellitus, type 2)  per endo  hba1c 6.4    # CAD (coronary artery disease)  # HTN  cont lopressor  c/w atorvastatin  asa on hold    # Parkinsons disease   # delirium  at baseline pt is poor historian but remains confused  remains on restraints as pulling at permacath  psych recs seroquel and olanzpine prn if aggitated  carbidopa/levodopa   c/w trihexyphenidyl    PCP Dr. Simons    [facility]  [phone]

## 2021-11-18 NOTE — PROGRESS NOTE ADULT - ASSESSMENT
ASSESSMENT/PLAN:  70yo M w/ PMHx of CAD (s/p CABG 2019), CKD (unknown stage), DM2, Parkinson's Disease, HTN, depression presents with bilateral leg swelling  ESRD   Severe LV dysfunction ;  A flutter   Confusion - alert at present     1 IR-  s/p left upper av access with intact staples    2 Renal-  HD today     3 CVS- BP controlled at present, on Midodrine (with hold parameters in place, SBP> 160),  Lopressor for heart rate control   May reduce the Midodrine to 10mg if the SBP remains elevated     4 Anemia - hgb low, same as yesterday; Retacrit 10k units at HD     5 Vasc - s/p  LUE AVF  with intact staples    DC planning to subacute with HD     Natividad Friedman NP-C  [facility]  [phone]

## 2021-11-18 NOTE — PROGRESS NOTE ADULT - ASSESSMENT
70yo M w/ PMHx of CAD (s/p CABG 2019), CKD (unknown stage), DM2, Parkinson's Disease, HTN, depression presents with new onset acute heart failure exacerbation, NSTEMI, started on hep gtt, developed bl RP bleed, acute anemia. sp MICU course for hemorrhagic shock, 10/28 sp IR embolization l4 and l5 lumbar artery. for permacath and AVF.    # Acute systolic and diastolic heart failure, improved  # NSTEMI- conservative mgmt dt renal function and anemia-bld loss  # ESRD, new HD  # Atrial flutter  # acute hypoxic resp failure, improved  # hemorrhagic shock, left RP hematoma, acute blood loss anemia  # lupus anticoagulant +  Echo TTE mod to severe LV SD, hypokinesis anterior wall, not candidate for cath at this point  HD per renal  10/28 sp IR embolization l4 and l5 lumbar artery  on midodrine during dialysis  WBC stable, improved overall  sp permacath placement  sp L AVF  new left UE swelling, hematoma noted on doppler US   concern for bleeding with drop in hgb, transfuse 1 unit prbc and 2 unit FFP  LUE doppler noted  vascular following- ace wrapping, pulses palpable    # DM2 (diabetes mellitus, type 2)  per endo  hba1c 6.4    # CAD (coronary artery disease)  # HTN  cont lopressor  c/w atorvastatin  asa on hold    # Parkinsons disease   # delirium  at baseline pt is poor historian but remains confused  remains on restraints as pulling at permacath  psych recs seroquel and olanzpine prn if agitated  carbidopa/levodopa   c/w trihexyphenidyl    PCP Dr. Simons    [facility]  [phone]

## 2021-11-18 NOTE — PROGRESS NOTE ADULT - SUBJECTIVE AND OBJECTIVE BOX
Follow-up Pulm Progress Note  Brayan Verma MD  [phone]    Breathing comforably on room air: sats in mid 90's  No new resp issues      Vital Signs Last 24 Hrs  T(C): 36.9 (18 Nov 2021 14:00), Max: 36.9 (18 Nov 2021 14:00)  T(F): 98.5 (18 Nov 2021 14:00), Max: 98.5 (18 Nov 2021 14:00)  HR: 70 (18 Nov 2021 14:00) (70 - 102)  BP: 130/77 (18 Nov 2021 14:00) (130/77 - 133/79)  BP(mean): --  RR: 18 (18 Nov 2021 14:00) (18 - 18)  SpO2: 99% (18 Nov 2021 14:00) (96% - 99%)                        7.9    9.65  )-----------( 301      ( 18 Nov 2021 06:10 )             26.4       11-18    135  |  96  |  48<H>  ----------------------------<  119<H>  4.1   |  23  |  5.76<H>    Ca    9.0      18 Nov 2021 06:06  Phos  4.3     11-18  Mg     2.2     11-18      Physical Examination:  PULM: Diminished basilar BS  CVS: Regular rate and rhythm, no murmurs, rubs, or gallops  ABD: Soft, non-tender  EXT:  No clubbing, cyanosis, or edema    RADIOLOGY REVIEWED  CXR:    CT chest:    TTE:

## 2021-11-18 NOTE — PROGRESS NOTE ADULT - SUBJECTIVE AND OBJECTIVE BOX
[facility]      Seen earlier today, and examined.  - Today, patient is without complaints.           *****MEDICATIONS: Current medication reviewed and documented.    MEDICATIONS  (STANDING):  ascorbic acid 500 milliGRAM(s) Oral daily  atorvastatin 80 milliGRAM(s) Oral at bedtime  carbidopa/levodopa  25/100 2.5 Tablet(s) Oral <User Schedule>  carbidopa/levodopa  25/100 2 Tablet(s) Oral <User Schedule>  chlorhexidine 4% Liquid 1 Application(s) Topical <User Schedule>  cholecalciferol 1000 Unit(s) Oral daily  dextrose 5%. 1000 milliLiter(s) (50 mL/Hr) IV Continuous <Continuous>  dextrose 5%. 1000 milliLiter(s) (100 mL/Hr) IV Continuous <Continuous>  dextrose 50% Injectable 25 Gram(s) IV Push once  dextrose 50% Injectable 25 Gram(s) IV Push once  dextrose 50% Injectable 12.5 Gram(s) IV Push once  epoetin mari-epbx (RETACRIT) Injectable 08793 Unit(s) IV Push once  folic acid 1 milliGRAM(s) Oral daily  glucagon  Injectable 1 milliGRAM(s) IntraMuscular once  insulin glargine Injectable (LANTUS) 6 Unit(s) SubCutaneous at bedtime  insulin lispro (ADMELOG) corrective regimen sliding scale   SubCutaneous three times a day before meals  insulin lispro (ADMELOG) corrective regimen sliding scale   SubCutaneous at bedtime  levothyroxine 25 MICROGram(s) Oral daily  memantine 5 milliGRAM(s) Oral at bedtime  metoprolol tartrate 25 milliGRAM(s) Oral two times a day  midodrine 20 milliGRAM(s) Oral <User Schedule>  polyethylene glycol 3350 17 Gram(s) Oral two times a day  QUEtiapine 12.5 milliGRAM(s) Oral two times a day  senna Syrup 10 milliLiter(s) Oral at bedtime  trihexyphenidyl 2 milliGRAM(s) Oral three times a day    MEDICATIONS  (PRN):  acetaminophen     Tablet .. 650 milliGRAM(s) Oral every 6 hours PRN Mild Pain (1 - 3)  albuterol/ipratropium for Nebulization 3 milliLiter(s) Nebulizer every 6 hours PRN Shortness of Breath and/or Wheezing  guaiFENesin Oral Liquid (Sugar-Free) 200 milliGRAM(s) Oral every 6 hours PRN Cough  QUEtiapine 25 milliGRAM(s) Oral every 6 hours PRN Agitation  sodium chloride 0.9% lock flush 10 milliLiter(s) IV Push every 1 hour PRN Pre/post blood products, medications, blood draw, and to maintain line patency          ***** VITAL SIGNS:  T(F): 98 (21 @ 04:44), Max: 98.4 (21 @ 12:47)  HR: 102 (21 @ 04:44) (80 - 102)  BP: 131/80 (21 @ 04:44) (131/80 - 134/81)  RR: 18 (21 @ 04:44) (18 - 18)  SpO2: 96% (21 @ 04:44) (96% - 100%)  Wt(kg): --  ,   I&O's Summary    2021 07:01  -  2021 07:00  --------------------------------------------------------  IN: 580 mL / OUT: 0 mL / NET: 580 mL             *****PHYSICAL EXAM:   pt sound asleep   not arousable to verbal and tactile stimuli            *****LAB AND IMAGIN.9    9.65  )-----------( 301      ( 2021 06:10 )             26.4                   135  |  96  |  48<H>  ----------------------------<  119<H>  4.1   |  23  |  5.76<H>    Ca    9.0      2021 06:06  Phos  4.3       Mg     2.2                                [All pertinent recent Imaging/Reports reviewed]           *****A S S E S S M E N T   A N D   P L A N :      Excerpt from H&P,"  72yo M w/ PMHx of CAD (s/p CABG ), CKD (unknown stage), DM2, Parkinson's Disease, HTN, depression presents with bilateral leg swelling, patient's daughter in-law at bedside Elsy Quintero ([employer] Nurse) provides the history, the daughter reports the patient is a poor historian, unable to remember having conversations with others and likely cannot weigh risk/benefits of medical conditions, she reports that he has had bilateral lower extremity swelling for the past few months, but over the past 2 weeks it has significantly worsened, he has further difficulty ambulating due to swelling, his outpatient provider attempted to manage with 20mg lasix and then increased to 40mg lasix but the patient never took the increased dose, his symptoms ar persistent throughout the day and are extremely severe, he has developed wounds of the legs with weeping of fluid due to the swelling, she reports that the patient is frequently non-compliant with medications and medical management, he lives at home with his son and daughter in law, he denies chest pain, shortness of breath, fever/chills, cough, sputum, in the ED, he was tachycardic but hemodynamically stable, afebrile, saturating well on room air, labs were significant for elevated BNP, elevated creatinine, imaging showed moderate left pleural effusion, patient was admitted to general medicine for further management          Problem/Recommendations 1:ams of unclear etiology   likely related to multiple metabolic derangements related to uremia  sleep wake cycle disruption and poor nutritional intake  would regulate sleep wake cycle  check b12/b1 folate/tsh /ammonia wnl   thiamine 500 iv q 8 x 2 days after checking vitamin b1 level   nutrition consult for severe protein caloric malnutrition   discussed with elsy( daughter in law), agreeing to start namenda 5 mg qhs probable early lewy body dementia related paranoia and agitation   plan likely needs to adjust dose of antipsychotics, however avoid typical antipsychotics  elsy denies any alcohol intake at all.     lowered seroquel 12.5 due to sedation    over night pt did not sleep   now fell asleep as per aide at 7.15   regulate sleep wake cycle   dc am dose of seroquel   increase night time dose to regulate sleep wake cycle         Problem/Recommendations 2: weakness   r/o orthostatic hypotension   pt shaking on standing up    prob deconditioning   pt eval   oob to chair as tolerated        Problem/Recommendations 3: parkinsons   continue current regimen   sinemet 2. 5 twice a day and 2 mg qh    Thank you for allowing me to participate in the care of this patient. Will continue to follow patient periodically. Please do not hesitate to call me if you have any  questions or if there has been a change in patients neurological status     ________________  Lily Fang MD  [facility] ([facility]  [phone]      33 minutes spent on total encounter; more than 50 % of the visit was  spent counseling about plan of care, compliance to diet/exercise and medication regimen and or  coordinating care by the attending physician.      It is advised that stroke patients follow up with ZACH Albarran @ [phone] in 1- 2 weeks.   Others please follow up with Dr. Michael Nissenbaum [phone]

## 2021-11-18 NOTE — PROGRESS NOTE ADULT - ASSESSMENT
Assessment  DMT2: 71y Male with DM T2 with hyperglycemia, A1C 6.4%, was on insulin at home, now on low-dose basal insulin and coverage, blood sugars are stable and trending within acceptable range, no  hypoglycemic episodes. Patient is eating partial meals, RD eval reviewed for malnutrition, confused, NAD.  Hypothyroidism: Patient has no history thyroid disease, was not on any thyroid supplements, subclinical with low-normal FT4, lethargic, started on synthroid 25 mcg po daily, FT4 improved to 1.2.  CHF: on medications, stable, monitored.  HTN: Controlled,  on antihypertensive medications.  Parkinsons: on meds, monitored.  CKD: Monitor labs/BMP      Kelsie Reece MD  Cell: [phone]  Office: [phone]     Assessment  DMT2: 71y Male with DM T2 with hyperglycemia, A1C 6.4%, was on insulin at home, now on low-dose basal insulin and coverage, blood sugars are stable and trending within acceptable range, no   hypoglycemic episodes. Patient is eating partial meals, RD eval reviewed for malnutrition, confused, NAD.  Hypothyroidism: Patient has no history thyroid disease, was not on any thyroid supplements, subclinical with low-normal FT4, lethargic, started on synthroid 25 mcg po daily, FT4 improved to 1.2.  CHF: on medications, stable, monitored.  HTN: Controlled,  on antihypertensive medications.  Parkinsons: on meds, monitored.  CKD: Monitor labs/BMP      Kelsie Reece MD  Cell: [phone]  Office: [phone]

## 2021-11-18 NOTE — PROGRESS NOTE ADULT - SUBJECTIVE AND OBJECTIVE BOX
Patient is a 71y old  Male who presents with a chief complaint of leg swelling (18 Nov 2021 14:38)      INTERVAL HPI/OVERNIGHT EVENTS: noted  pt seen and examined this am   events noted  pt confused and incoherent  son at bedside      Vital Signs Last 24 Hrs  T(C): 37.1 (18 Nov 2021 21:33), Max: 37.2 (18 Nov 2021 17:07)  T(F): 98.7 (18 Nov 2021 21:33), Max: 99 (18 Nov 2021 17:07)  HR: 98 (18 Nov 2021 21:33) (70 - 102)  BP: 153/75 (18 Nov 2021 21:33) (130/77 - 153/76)  BP(mean): --  RR: 18 (18 Nov 2021 21:33) (18 - 18)  SpO2: 96% (18 Nov 2021 21:33) (96% - 99%)    acetaminophen     Tablet .. 650 milliGRAM(s) Oral every 6 hours PRN  albuterol/ipratropium for Nebulization 3 milliLiter(s) Nebulizer every 6 hours PRN  atorvastatin 80 milliGRAM(s) Oral at bedtime  carbidopa/levodopa  25/100 2.5 Tablet(s) Oral <User Schedule>  carbidopa/levodopa  25/100 2 Tablet(s) Oral <User Schedule>  chlorhexidine 4% Liquid 1 Application(s) Topical <User Schedule>  cholecalciferol 1000 Unit(s) Oral daily  dextrose 5%. 1000 milliLiter(s) IV Continuous <Continuous>  dextrose 5%. 1000 milliLiter(s) IV Continuous <Continuous>  dextrose 50% Injectable 25 Gram(s) IV Push once  dextrose 50% Injectable 12.5 Gram(s) IV Push once  dextrose 50% Injectable 25 Gram(s) IV Push once  folic acid 1 milliGRAM(s) Oral daily  glucagon  Injectable 1 milliGRAM(s) IntraMuscular once  guaiFENesin Oral Liquid (Sugar-Free) 200 milliGRAM(s) Oral every 6 hours PRN  insulin glargine Injectable (LANTUS) 6 Unit(s) SubCutaneous at bedtime  insulin lispro (ADMELOG) corrective regimen sliding scale   SubCutaneous three times a day before meals  insulin lispro (ADMELOG) corrective regimen sliding scale   SubCutaneous at bedtime  levothyroxine 25 MICROGram(s) Oral daily  memantine 5 milliGRAM(s) Oral at bedtime  metoprolol tartrate 25 milliGRAM(s) Oral two times a day  midodrine 20 milliGRAM(s) Oral <User Schedule>  Nephro-celeste 1 Tablet(s) Oral daily  polyethylene glycol 3350 17 Gram(s) Oral two times a day  QUEtiapine 25 milliGRAM(s) Oral every 6 hours PRN  QUEtiapine 12.5 milliGRAM(s) Oral daily  senna Syrup 10 milliLiter(s) Oral at bedtime  sodium chloride 0.9% lock flush 10 milliLiter(s) IV Push every 1 hour PRN  trihexyphenidyl 2 milliGRAM(s) Oral three times a day      PHYSICAL EXAM:  GENERAL: NAD,   EYES: conjunctiva and sclera clear  ENMT: Moist mucous membranes  NECK: Supple, No JVD, Normal thyroid  CHEST/LUNG: non labored, cta b/l  HEART: Regular rate and rhythm; No murmurs, rubs, or gallops  ABDOMEN: Soft, Nontender, Nondistended; Bowel sounds present  EXTREMITIES:  2+ Peripheral Pulses, No clubbing, cyanosis, or edema  LYMPH: No lymphadenopathy noted  SKIN: No rashes or lesions    Consultant(s) Notes Reviewed:  [x ] YES  [ ] NO  Care Discussed with Consultants/Other Providers [ x] YES  [ ] NO    LABS:                        7.9    9.65  )-----------( 301      ( 18 Nov 2021 06:10 )             26.4     11-18    135  |  96  |  48<H>  ----------------------------<  119<H>  4.1   |  23  |  5.76<H>    Ca    9.0      18 Nov 2021 06:06  Phos  4.3     11-18  Mg     2.2     11-18          CAPILLARY BLOOD GLUCOSE      POCT Blood Glucose.: 140 mg/dL (18 Nov 2021 21:37)  POCT Blood Glucose.: 98 mg/dL (18 Nov 2021 17:55)  POCT Blood Glucose.: 144 mg/dL (18 Nov 2021 11:42)  POCT Blood Glucose.: 150 mg/dL (18 Nov 2021 07:54)              RADIOLOGY & ADDITIONAL TESTS:    Imaging Personally Reviewed:  [x ] YES  [ ] NO

## 2021-11-18 NOTE — PROGRESS NOTE ADULT - SUBJECTIVE AND OBJECTIVE BOX
Chief complaint  Patient is a 71y old  Male who presents with a chief complaint of leg swelling (18 Nov 2021 10:39)   Review of systems  Patient in bed, looks comfortable, no hypoglycemic episodes.    Labs and Fingersticks  CAPILLARY BLOOD GLUCOSE      POCT Blood Glucose.: 144 mg/dL (18 Nov 2021 11:42)  POCT Blood Glucose.: 150 mg/dL (18 Nov 2021 07:54)  POCT Blood Glucose.: 164 mg/dL (17 Nov 2021 22:19)  POCT Blood Glucose.: 131 mg/dL (17 Nov 2021 17:14)      Anion Gap, Serum: 16 (11-18 @ 06:06)  Anion Gap, Serum: 14 (11-17 @ 08:13)      Calcium, Total Serum: 9.0 (11-18 @ 06:06)  Calcium, Total Serum: 9.0 (11-17 @ 08:13)          11-18    135  |  96  |  48<H>  ----------------------------<  119<H>  4.1   |  23  |  5.76<H>    Ca    9.0      18 Nov 2021 06:06  Phos  4.3     11-18  Mg     2.2     11-18                          7.9    9.65  )-----------( 301      ( 18 Nov 2021 06:10 )             26.4     Medications  MEDICATIONS  (STANDING):  ascorbic acid 500 milliGRAM(s) Oral daily  atorvastatin 80 milliGRAM(s) Oral at bedtime  carbidopa/levodopa  25/100 2.5 Tablet(s) Oral <User Schedule>  carbidopa/levodopa  25/100 2 Tablet(s) Oral <User Schedule>  chlorhexidine 4% Liquid 1 Application(s) Topical <User Schedule>  cholecalciferol 1000 Unit(s) Oral daily  dextrose 5%. 1000 milliLiter(s) (50 mL/Hr) IV Continuous <Continuous>  dextrose 5%. 1000 milliLiter(s) (100 mL/Hr) IV Continuous <Continuous>  dextrose 50% Injectable 25 Gram(s) IV Push once  dextrose 50% Injectable 12.5 Gram(s) IV Push once  dextrose 50% Injectable 25 Gram(s) IV Push once  epoetin mari-epbx (RETACRIT) Injectable 74017 Unit(s) IV Push once  folic acid 1 milliGRAM(s) Oral daily  glucagon  Injectable 1 milliGRAM(s) IntraMuscular once  insulin glargine Injectable (LANTUS) 6 Unit(s) SubCutaneous at bedtime  insulin lispro (ADMELOG) corrective regimen sliding scale   SubCutaneous three times a day before meals  insulin lispro (ADMELOG) corrective regimen sliding scale   SubCutaneous at bedtime  levothyroxine 25 MICROGram(s) Oral daily  memantine 5 milliGRAM(s) Oral at bedtime  metoprolol tartrate 25 milliGRAM(s) Oral two times a day  midodrine 20 milliGRAM(s) Oral <User Schedule>  polyethylene glycol 3350 17 Gram(s) Oral two times a day  QUEtiapine 12.5 milliGRAM(s) Oral two times a day  senna Syrup 10 milliLiter(s) Oral at bedtime  trihexyphenidyl 2 milliGRAM(s) Oral three times a day      Physical Exam  General: Patient comfortable in bed  Vital Signs Last 12 Hrs  T(F): 97.8 (11-18-21 @ 11:40), Max: 98 (11-18-21 @ 04:44)  HR: 75 (11-18-21 @ 11:40) (75 - 102)  BP: 130/82 (11-18-21 @ 11:40) (130/82 - 131/80)  BP(mean): --  RR: 18 (11-18-21 @ 11:40) (18 - 18)  SpO2: 97% (11-18-21 @ 11:40) (96% - 97%)  Neck: No palpable thyroid nodules.  CVS: S1S2, No murmurs  Respiratory: No wheezing, no crepitations  GI: Abdomen soft, bowel sounds positive  Musculoskeletal:  edema lower extremities.   Skin: No skin rashes, no ecchymosis    Diagnostics    Free Thyroxine, Serum: AM Sched. Collection: 06-Nov-2021 06:00 (11-05 @ 13:18)  Free Thyroxine, Serum: AM Sched. Collection: 26-Oct-2021 06:00 (10-25 @ 11:45)  Thyroid Stimulating Hormone, Serum: AM Sched. Collection: 26-Oct-2021 06:00 (10-25 @ 11:45)  Cortisol AM, Serum: 08:00 (10-22 @ 12:47)  Free Thyroxine, Serum: AM Sched. Collection: 23-Oct-2021 06:00 (10-22 @ 12:43)  Thyroid Stimulating Hormone, Serum: AM Sched. Collection: 23-Oct-2021 06:00 (10-22 @ 12:43)             Chief complaint  Patient is a 71y old  Male who presents with a chief complaint of leg swelling (18 Nov 2021 10:39)   Review of systems  Patient in bed, looks comfortable, no hypoglycemic episodes.    Labs and Fingersticks  CAPILLARY BLOOD GLUCOSE      POCT Blood Glucose.: 144 mg/dL (18 Nov 2021 11:42)  POCT Blood Glucose.: 150 mg/dL (18 Nov 2021 07:54)  POCT Blood Glucose.: 164 mg/dL (17 Nov 2021 22:19)  POCT Blood Glucose.: 131 mg/dL (17 Nov 2021 17:14)        Anion Gap, Serum: 16 (11-18 @ 06:06)  Anion Gap, Serum: 14 (11-17 @ 08:13)      Calcium, Total Serum: 9.0 (11-18 @ 06:06)  Calcium, Total Serum: 9.0 (11-17 @ 08:13)          11-18    135  |  96  |  48<H>  ----------------------------<  119<H>  4.1   |  23  |  5.76<H>    Ca    9.0      18 Nov 2021 06:06  Phos  4.3     11-18  Mg     2.2     11-18                          7.9    9.65  )-----------( 301      ( 18 Nov 2021 06:10 )             26.4     Medications  MEDICATIONS  (STANDING):  ascorbic acid 500 milliGRAM(s) Oral daily  atorvastatin 80 milliGRAM(s) Oral at bedtime  carbidopa/levodopa  25/100 2.5 Tablet(s) Oral <User Schedule>  carbidopa/levodopa  25/100 2 Tablet(s) Oral <User Schedule>  chlorhexidine 4% Liquid 1 Application(s) Topical <User Schedule>  cholecalciferol 1000 Unit(s) Oral daily  dextrose 5%. 1000 milliLiter(s) (50 mL/Hr) IV Continuous <Continuous>  dextrose 5%. 1000 milliLiter(s) (100 mL/Hr) IV Continuous <Continuous>  dextrose 50% Injectable 25 Gram(s) IV Push once  dextrose 50% Injectable 12.5 Gram(s) IV Push once  dextrose 50% Injectable 25 Gram(s) IV Push once  epoetin mari-epbx (RETACRIT) Injectable 76190 Unit(s) IV Push once  folic acid 1 milliGRAM(s) Oral daily  glucagon  Injectable 1 milliGRAM(s) IntraMuscular once  insulin glargine Injectable (LANTUS) 6 Unit(s) SubCutaneous at bedtime  insulin lispro (ADMELOG) corrective regimen sliding scale   SubCutaneous three times a day before meals  insulin lispro (ADMELOG) corrective regimen sliding scale   SubCutaneous at bedtime  levothyroxine 25 MICROGram(s) Oral daily  memantine 5 milliGRAM(s) Oral at bedtime  metoprolol tartrate 25 milliGRAM(s) Oral two times a day  midodrine 20 milliGRAM(s) Oral <User Schedule>  polyethylene glycol 3350 17 Gram(s) Oral two times a day  QUEtiapine 12.5 milliGRAM(s) Oral two times a day  senna Syrup 10 milliLiter(s) Oral at bedtime  trihexyphenidyl 2 milliGRAM(s) Oral three times a day      Physical Exam  General: Patient comfortable in bed  Vital Signs Last 12 Hrs  T(F): 97.8 (11-18-21 @ 11:40), Max: 98 (11-18-21 @ 04:44)  HR: 75 (11-18-21 @ 11:40) (75 - 102)  BP: 130/82 (11-18-21 @ 11:40) (130/82 - 131/80)  BP(mean): --  RR: 18 (11-18-21 @ 11:40) (18 - 18)  SpO2: 97% (11-18-21 @ 11:40) (96% - 97%)  Neck: No palpable thyroid nodules.  CVS: S1S2, No murmurs  Respiratory: No wheezing, no crepitations  GI: Abdomen soft, bowel sounds positive  Musculoskeletal:  edema lower extremities.   Skin: No skin rashes, no ecchymosis    Diagnostics    Free Thyroxine, Serum: AM Sched. Collection: 06-Nov-2021 06:00 (11-05 @ 13:18)  Free Thyroxine, Serum: AM Sched. Collection: 26-Oct-2021 06:00 (10-25 @ 11:45)  Thyroid Stimulating Hormone, Serum: AM Sched. Collection: 26-Oct-2021 06:00 (10-25 @ 11:45)  Cortisol AM, Serum: 08:00 (10-22 @ 12:47)  Free Thyroxine, Serum: AM Sched. Collection: 23-Oct-2021 06:00 (10-22 @ 12:43)  Thyroid Stimulating Hormone, Serum: AM Sched. Collection: 23-Oct-2021 06:00 (10-22 @ 12:43)

## 2021-11-18 NOTE — PROGRESS NOTE ADULT - SUBJECTIVE AND OBJECTIVE BOX
NEPHROLOGY    Patient seen and examined.    MEDICATIONS  (STANDING):  ascorbic acid 500 milliGRAM(s) Oral daily  atorvastatin 80 milliGRAM(s) Oral at bedtime  carbidopa/levodopa  25/100 2.5 Tablet(s) Oral <User Schedule>  carbidopa/levodopa  25/100 2 Tablet(s) Oral <User Schedule>  chlorhexidine 4% Liquid 1 Application(s) Topical <User Schedule>  cholecalciferol 1000 Unit(s) Oral daily  dextrose 5%. 1000 milliLiter(s) (100 mL/Hr) IV Continuous <Continuous>  dextrose 5%. 1000 milliLiter(s) (50 mL/Hr) IV Continuous <Continuous>  dextrose 50% Injectable 25 Gram(s) IV Push once  dextrose 50% Injectable 12.5 Gram(s) IV Push once  dextrose 50% Injectable 25 Gram(s) IV Push once  epoetin mari-epbx (RETACRIT) Injectable 48396 Unit(s) IV Push once  folic acid 1 milliGRAM(s) Oral daily  glucagon  Injectable 1 milliGRAM(s) IntraMuscular once  insulin glargine Injectable (LANTUS) 6 Unit(s) SubCutaneous at bedtime  insulin lispro (ADMELOG) corrective regimen sliding scale   SubCutaneous three times a day before meals  insulin lispro (ADMELOG) corrective regimen sliding scale   SubCutaneous at bedtime  levothyroxine 25 MICROGram(s) Oral daily  memantine 5 milliGRAM(s) Oral at bedtime  metoprolol tartrate 25 milliGRAM(s) Oral two times a day  midodrine 20 milliGRAM(s) Oral <User Schedule>  polyethylene glycol 3350 17 Gram(s) Oral two times a day  QUEtiapine 12.5 milliGRAM(s) Oral two times a day  senna Syrup 10 milliLiter(s) Oral at bedtime  trihexyphenidyl 2 milliGRAM(s) Oral three times a day    VITALS:  T(C): , Max: 36.7 (11-18-21 @ 04:44)  T(F): , Max: 98 (11-18-21 @ 04:44)  HR: 75 (11-18-21 @ 11:40)  BP: 130/82 (11-18-21 @ 11:40)  RR: 18 (11-18-21 @ 11:40)  SpO2: 97% (11-18-21 @ 11:40)    I and O's:    11-17 @ 07:01  -  11-18 @ 07:00  --------------------------------------------------------  IN: 580 mL / OUT: 0 mL / NET: 580 mL    PHYSICAL EXAM:  Constitutional: NAD  Neck:  No JVD  Respiratory: CTAB/L  Cardiovascular: S1 and S2  Gastrointestinal: BS+, soft, NT/ND  Extremities: No peripheral edema  Neurological: A/O x 3, no focal deficits  Psychiatric: Normal mood, normal affect  : No Javier  Skin: No rashes  Access: avg; perm cath    LABS:                        7.9    9.65  )-----------( 301      ( 18 Nov 2021 06:10 )             26.4     11-18    135  |  96  |  48<H>  ----------------------------<  119<H>  4.1   |  23  |  5.76<H>    Ca    9.0      18 Nov 2021 06:06  Phos  4.3     11-18  Mg     2.2     11-18    RADIOLOGY & ADDITIONAL STUDIES:    EXAM:  DUPLEX EXT VEINS UPPER LT                            PROCEDURE DATE:  11/17/2021        INTERPRETATION:  CLINICAL INFORMATION: Recently placed left upper extremity dialysis access fistula, left upper extremity swelling    COMPARISON: None available.    TECHNIQUE: Duplex sonography of the LEFT UPPER extremity veins with color and spectral Doppler, with and without compression.    FINDINGS:    There is a small hematoma located at the surgical site of the recently placed dialysis accessfistula    The left internal jugular, subclavian, axillary and brachial veins are patent and compressible where applicable.  The basilic and cephalic veins (superficial veins) are patent and without thrombus.    Doppler examination shows normal spontaneous and phasic flow.    IMPRESSION:  No evidence of left upper extremity deep venous thrombosis.        --- End of Report ---                SELINA MAGDALENO MD; Attending Radiologist  This document has been electronically signed. Nov 17 2021 11:39AM   NEPHROLOGY    Patient seen and examined with son at bedside. Pt without complaints, no pain, no sob, in no acute distress.     MEDICATIONS  (STANDING):  ascorbic acid 500 milliGRAM(s) Oral daily  atorvastatin 80 milliGRAM(s) Oral at bedtime  carbidopa/levodopa  25/100 2.5 Tablet(s) Oral <User Schedule>  carbidopa/levodopa  25/100 2 Tablet(s) Oral <User Schedule>  chlorhexidine 4% Liquid 1 Application(s) Topical <User Schedule>  cholecalciferol 1000 Unit(s) Oral daily  dextrose 5%. 1000 milliLiter(s) (100 mL/Hr) IV Continuous <Continuous>  dextrose 5%. 1000 milliLiter(s) (50 mL/Hr) IV Continuous <Continuous>  dextrose 50% Injectable 25 Gram(s) IV Push once  dextrose 50% Injectable 12.5 Gram(s) IV Push once  dextrose 50% Injectable 25 Gram(s) IV Push once  epoetin mari-epbx (RETACRIT) Injectable 46417 Unit(s) IV Push once  folic acid 1 milliGRAM(s) Oral daily  glucagon  Injectable 1 milliGRAM(s) IntraMuscular once  insulin glargine Injectable (LANTUS) 6 Unit(s) SubCutaneous at bedtime  insulin lispro (ADMELOG) corrective regimen sliding scale   SubCutaneous three times a day before meals  insulin lispro (ADMELOG) corrective regimen sliding scale   SubCutaneous at bedtime  levothyroxine 25 MICROGram(s) Oral daily  memantine 5 milliGRAM(s) Oral at bedtime  metoprolol tartrate 25 milliGRAM(s) Oral two times a day  midodrine 20 milliGRAM(s) Oral <User Schedule>  polyethylene glycol 3350 17 Gram(s) Oral two times a day  QUEtiapine 12.5 milliGRAM(s) Oral two times a day  senna Syrup 10 milliLiter(s) Oral at bedtime  trihexyphenidyl 2 milliGRAM(s) Oral three times a day    VITALS:  T(C): , Max: 36.7 (11-18-21 @ 04:44)  T(F): , Max: 98 (11-18-21 @ 04:44)  HR: 75 (11-18-21 @ 11:40)  BP: 130/82 (11-18-21 @ 11:40)  RR: 18 (11-18-21 @ 11:40)  SpO2: 97% (11-18-21 @ 11:40)    I and O's:    11-17 @ 07:01  -  11-18 @ 07:00  --------------------------------------------------------  IN: 580 mL / OUT: 0 mL / NET: 580 mL    PHYSICAL EXAM:  Constitutional: NAD, confused   Neck:  No JVD  Respiratory: CTAB/L  Cardiovascular: S1 and S2  Gastrointestinal: BS+, soft, NT/ND  Extremities: LUE swelling,  Neurological: limited  Psychiatric: Normal mood, normal affect  : No Javier  Skin: No rashes  Access: LUE AVF; perm cath    LABS:                        7.9    9.65  )-----------( 301      ( 18 Nov 2021 06:10 )             26.4     11-18    135  |  96  |  48<H>  ----------------------------<  119<H>  4.1   |  23  |  5.76<H>    Ca    9.0      18 Nov 2021 06:06  Phos  4.3     11-18  Mg     2.2     11-18    RADIOLOGY & ADDITIONAL STUDIES:    EXAM:  DUPLEX EXT VEINS UPPER LT                            PROCEDURE DATE:  11/17/2021        INTERPRETATION:  CLINICAL INFORMATION: Recently placed left upper extremity dialysis access fistula, left upper extremity swelling    COMPARISON: None available.    TECHNIQUE: Duplex sonography of the LEFT UPPER extremity veins with color and spectral Doppler, with and without compression.    FINDINGS:    There is a small hematoma located at the surgical site of the recently placed dialysis accessfistula    The left internal jugular, subclavian, axillary and brachial veins are patent and compressible where applicable.  The basilic and cephalic veins (superficial veins) are patent and without thrombus.    Doppler examination shows normal spontaneous and phasic flow.    IMPRESSION:  No evidence of left upper extremity deep venous thrombosis.        --- End of Report ---    SELINA MAGDALENO MD; Attending Radiologist  This document has been electronically signed. Nov 17 2021 11:39AM

## 2021-11-18 NOTE — PROGRESS NOTE ADULT - SUBJECTIVE AND OBJECTIVE BOX
SURGERY DAILY PROGRESS NOTE:     SUBJECTIVE/ROS: Patient feels well. Seen and examined at bedside. Denies pain at this time. Arm wrapped with ACE.      MEDICATIONS  (STANDING):  ascorbic acid 500 milliGRAM(s) Oral daily  atorvastatin 80 milliGRAM(s) Oral at bedtime  carbidopa/levodopa  25/100 2.5 Tablet(s) Oral <User Schedule>  carbidopa/levodopa  25/100 2 Tablet(s) Oral <User Schedule>  chlorhexidine 4% Liquid 1 Application(s) Topical <User Schedule>  cholecalciferol 1000 Unit(s) Oral daily  dextrose 5%. 1000 milliLiter(s) (50 mL/Hr) IV Continuous <Continuous>  dextrose 5%. 1000 milliLiter(s) (100 mL/Hr) IV Continuous <Continuous>  dextrose 50% Injectable 25 Gram(s) IV Push once  dextrose 50% Injectable 12.5 Gram(s) IV Push once  dextrose 50% Injectable 25 Gram(s) IV Push once  epoetin mari-epbx (RETACRIT) Injectable 69247 Unit(s) IV Push once  folic acid 1 milliGRAM(s) Oral daily  glucagon  Injectable 1 milliGRAM(s) IntraMuscular once  insulin glargine Injectable (LANTUS) 6 Unit(s) SubCutaneous at bedtime  insulin lispro (ADMELOG) corrective regimen sliding scale   SubCutaneous at bedtime  insulin lispro (ADMELOG) corrective regimen sliding scale   SubCutaneous three times a day before meals  levothyroxine 25 MICROGram(s) Oral daily  memantine 5 milliGRAM(s) Oral at bedtime  metoprolol tartrate 25 milliGRAM(s) Oral two times a day  midodrine 20 milliGRAM(s) Oral <User Schedule>  polyethylene glycol 3350 17 Gram(s) Oral two times a day  QUEtiapine 12.5 milliGRAM(s) Oral two times a day  senna Syrup 10 milliLiter(s) Oral at bedtime  trihexyphenidyl 2 milliGRAM(s) Oral three times a day    MEDICATIONS  (PRN):  acetaminophen     Tablet .. 650 milliGRAM(s) Oral every 6 hours PRN Mild Pain (1 - 3)  albuterol/ipratropium for Nebulization 3 milliLiter(s) Nebulizer every 6 hours PRN Shortness of Breath and/or Wheezing  guaiFENesin Oral Liquid (Sugar-Free) 200 milliGRAM(s) Oral every 6 hours PRN Cough  QUEtiapine 25 milliGRAM(s) Oral every 6 hours PRN Agitation  sodium chloride 0.9% lock flush 10 milliLiter(s) IV Push every 1 hour PRN Pre/post blood products, medications, blood draw, and to maintain line patency      OBJECTIVE:    Vital Signs Last 24 Hrs  T(C): 36.7 (2021 04:44), Max: 36.9 (2021 12:47)  T(F): 98 (2021 04:44), Max: 98.4 (2021 12:47)  HR: 102 (2021 04:44) (80 - 102)  BP: 131/80 (2021 04:44) (131/80 - 134/81)  BP(mean): --  RR: 18 (2021 04:44) (18 - 18)  SpO2: 96% (2021 04:44) (96% - 100%)    I&O's Detail    2021 07:01  -  2021 07:00  --------------------------------------------------------  IN:    Oral Fluid: 580 mL  Total IN: 580 mL    OUT:  Total OUT: 0 mL    Total NET: 580 mL          Daily     Daily Weight in k (2021 07:55)    LABS:                        7.9    9.65  )-----------( 301      ( 2021 06:10 )             26.4     11-18    135  |  96  |  48<H>  ----------------------------<  119<H>  4.1   |  23  |  5.76<H>    Ca    9.0      2021 06:06  Phos  4.3     11-18  Mg     2.2     11-18        < from: [facility] Duplex Upper Ext Vein Scan, Left (21 @ 10:46) >  INTERPRETATION:  CLINICAL INFORMATION: Recently placed left upper extremity dialysis access fistula, left upper extremity swelling    COMPARISON: None available.    TECHNIQUE: Duplex sonography of the LEFT UPPER extremity veins with color and spectral Doppler, with and without compression.    FINDINGS:    There is a small hematoma located at the surgical site of the recently placed dialysis accessfistula    The left internal jugular, subclavian, axillary and brachial veins are patent and compressible where applicable.  The basilic and cephalic veins (superficial veins) are patent and without thrombus.    Doppler examination shows normal spontaneous and phasic flow.    IMPRESSION:  No evidence of left upper extremity deep venous thrombosis.        --- End of Report ---    < end of copied text >    PHYSICAL EXAM:  General: AAOx3, NAD, lying comfortably in bed  Respiratory: nonlabored breathing  Abdomen: non-distended, soft, non-tender  Extremities: LUE with increased swelling; thrill palpable in LUE fistula, firmness noted to palpation on distal portion of AVF        Assessment and Plan:       - please keep LUE elevated and ACE wrapped for swelling  - thrill palpable  - no acute intervention at this time    Vascular Surgery, [id number]     SURGERY DAILY PROGRESS NOTE:     SUBJECTIVE/ROS: Patient feels well. Seen and examined at bedside. Denies pain at this time. Arm wrapped with ACE.      MEDICATIONS  (STANDING):  ascorbic acid 500 milliGRAM(s) Oral daily  atorvastatin 80 milliGRAM(s) Oral at bedtime  carbidopa/levodopa  25/100 2.5 Tablet(s) Oral <User Schedule>  carbidopa/levodopa  25/100 2 Tablet(s) Oral <User Schedule>  chlorhexidine 4% Liquid 1 Application(s) Topical <User Schedule>  cholecalciferol 1000 Unit(s) Oral daily  dextrose 5%. 1000 milliLiter(s) (50 mL/Hr) IV Continuous <Continuous>  dextrose 5%. 1000 milliLiter(s) (100 mL/Hr) IV Continuous <Continuous>  dextrose 50% Injectable 25 Gram(s) IV Push once  dextrose 50% Injectable 12.5 Gram(s) IV Push once  dextrose 50% Injectable 25 Gram(s) IV Push once  epoetin mari-epbx (RETACRIT) Injectable 10198 Unit(s) IV Push once  folic acid 1 milliGRAM(s) Oral daily  glucagon  Injectable 1 milliGRAM(s) IntraMuscular once  insulin glargine Injectable (LANTUS) 6 Unit(s) SubCutaneous at bedtime  insulin lispro (ADMELOG) corrective regimen sliding scale   SubCutaneous at bedtime  insulin lispro (ADMELOG) corrective regimen sliding scale   SubCutaneous three times a day before meals  levothyroxine 25 MICROGram(s) Oral daily  memantine 5 milliGRAM(s) Oral at bedtime  metoprolol tartrate 25 milliGRAM(s) Oral two times a day  midodrine 20 milliGRAM(s) Oral <User Schedule>  polyethylene glycol 3350 17 Gram(s) Oral two times a day  QUEtiapine 12.5 milliGRAM(s) Oral two times a day  senna Syrup 10 milliLiter(s) Oral at bedtime  trihexyphenidyl 2 milliGRAM(s) Oral three times a day    MEDICATIONS  (PRN):  acetaminophen     Tablet .. 650 milliGRAM(s) Oral every 6 hours PRN Mild Pain (1 - 3)  albuterol/ipratropium for Nebulization 3 milliLiter(s) Nebulizer every 6 hours PRN Shortness of Breath and/or Wheezing  guaiFENesin Oral Liquid (Sugar-Free) 200 milliGRAM(s) Oral every 6 hours PRN Cough  QUEtiapine 25 milliGRAM(s) Oral every 6 hours PRN Agitation  sodium chloride 0.9% lock flush 10 milliLiter(s) IV Push every 1 hour PRN Pre/post blood products, medications, blood draw, and to maintain line patency      OBJECTIVE:    Vital Signs Last 24 Hrs  T(C): 36.7 (2021 04:44), Max: 36.9 (2021 12:47)  T(F): 98 (2021 04:44), Max: 98.4 (2021 12:47)  HR: 102 (2021 04:44) (80 - 102)  BP: 131/80 (2021 04:44) (131/80 - 134/81)  BP(mean): --  RR: 18 (2021 04:44) (18 - 18)  SpO2: 96% (2021 04:44) (96% - 100%)    I&O's Detail    2021 07:01  -  2021 07:00  --------------------------------------------------------  IN:    Oral Fluid: 580 mL  Total IN: 580 mL    OUT:  Total OUT: 0 mL    Total NET: 580 mL          Daily     Daily Weight in k (2021 07:55)    LABS:                        7.9    9.65  )-----------( 301      ( 2021 06:10 )             26.4     11-18    135  |  96  |  48<H>  ----------------------------<  119<H>  4.1   |  23  |  5.76<H>    Ca    9.0      2021 06:06  Phos  4.3     11-18  Mg     2.2     11-18        < from: [facility] Duplex Upper Ext Vein Scan, Left (21 @ 10:46) >  INTERPRETATION:  CLINICAL INFORMATION: Recently placed left upper extremity dialysis access fistula, left upper extremity swelling    COMPARISON: None available.    TECHNIQUE: Duplex sonography of the LEFT UPPER extremity veins with color and spectral Doppler, with and without compression.    FINDINGS:    There is a small hematoma located at the surgical site of the recently placed dialysis accessfistula    The left internal jugular, subclavian, axillary and brachial veins are patent and compressible where applicable.  The basilic and cephalic veins (superficial veins) are patent and without thrombus.    Doppler examination shows normal spontaneous and phasic flow.    IMPRESSION:  No evidence of left upper extremity deep venous thrombosis.        --- End of Report ---    < end of copied text >    PHYSICAL EXAM:  General: AAOx3, NAD, lying comfortably in bed  Respiratory: nonlabored breathing  Abdomen: non-distended, soft, non-tender  Extremities: LUE with increased swelling; thrill palpable in LUE fistula, firmness noted to palpation on distal portion of AVF        Assessment and Plan:       - please keep LUE elevated and ACE wrapped for swelling  - thrill palpable, ulnar and radial pulse palpable  - hematoma noted on duplex, stable  - no acute intervention at this time  - please call back with questions    Vascular Surgery, [id number]

## 2021-11-19 NOTE — PROGRESS NOTE ADULT - ASSESSMENT
ASSESSMENT/PLAN:  70yo M w/ PMHx of CAD (s/p CABG 2019), CKD (unknown stage), DM2, Parkinson's Disease, HTN, depression presents with bilateral leg swelling  ESRD   Severe LV dysfunction ;  A flutter   Confusion - alert at present     1 IR-  s/p left upper avf access with intact staples    2 Renal-  HD tomorrow    3 CVS- BP controlled at present, on Midodrine (with hold parameters in place, SBP> 160),  Lopressor for heart rate control   May reduce the Midodrine to 10mg if the SBP remains elevated     4 Anemia - hgb slightly higher, s/p Retacrit 10k units at HD     5 Vasc - s/p  LUE AVF  with intact staples    DC planning to subacute with HD     Natividad Friedman NP-C  [facility]  [phone]

## 2021-11-19 NOTE — PROGRESS NOTE ADULT - SUBJECTIVE AND OBJECTIVE BOX
Chief complaint  Patient is a 71y old  Male who presents with a chief complaint of leg swelling (19 Nov 2021 09:53)   Review of systems  Patient in bed, looks comfortable, no hypoglycemic episodes.    Labs and Fingersticks  CAPILLARY BLOOD GLUCOSE      POCT Blood Glucose.: 164 mg/dL (19 Nov 2021 12:00)  POCT Blood Glucose.: 140 mg/dL (19 Nov 2021 07:54)  POCT Blood Glucose.: 140 mg/dL (18 Nov 2021 21:37)  POCT Blood Glucose.: 98 mg/dL (18 Nov 2021 17:55)      Anion Gap, Serum: 16 (11-19 @ 05:47)  Anion Gap, Serum: 16 (11-18 @ 06:06)      Calcium, Total Serum: 9.3 (11-19 @ 05:47)  Calcium, Total Serum: 9.0 (11-18 @ 06:06)          11-19    134<L>  |  95<L>  |  28<H>  ----------------------------<  120<H>  3.5   |  23  |  3.97<H>    Ca    9.3      19 Nov 2021 05:47  Phos  3.3     11-19  Mg     1.9     11-19                          9.1    10.47 )-----------( 314      ( 19 Nov 2021 05:47 )             29.8     Medications  MEDICATIONS  (STANDING):  atorvastatin 80 milliGRAM(s) Oral at bedtime  carbidopa/levodopa  25/100 2.5 Tablet(s) Oral <User Schedule>  carbidopa/levodopa  25/100 2 Tablet(s) Oral <User Schedule>  chlorhexidine 4% Liquid 1 Application(s) Topical <User Schedule>  cholecalciferol 1000 Unit(s) Oral daily  dextrose 5%. 1000 milliLiter(s) (50 mL/Hr) IV Continuous <Continuous>  dextrose 5%. 1000 milliLiter(s) (100 mL/Hr) IV Continuous <Continuous>  dextrose 50% Injectable 25 Gram(s) IV Push once  dextrose 50% Injectable 25 Gram(s) IV Push once  dextrose 50% Injectable 12.5 Gram(s) IV Push once  folic acid 1 milliGRAM(s) Oral daily  glucagon  Injectable 1 milliGRAM(s) IntraMuscular once  insulin glargine Injectable (LANTUS) 6 Unit(s) SubCutaneous at bedtime  insulin lispro (ADMELOG) corrective regimen sliding scale   SubCutaneous three times a day before meals  insulin lispro (ADMELOG) corrective regimen sliding scale   SubCutaneous at bedtime  levothyroxine 25 MICROGram(s) Oral daily  memantine 5 milliGRAM(s) Oral at bedtime  metoprolol tartrate 25 milliGRAM(s) Oral two times a day  midodrine 20 milliGRAM(s) Oral <User Schedule>  Nephro-celeste 1 Tablet(s) Oral daily  polyethylene glycol 3350 17 Gram(s) Oral two times a day  QUEtiapine 12.5 milliGRAM(s) Oral daily  senna Syrup 10 milliLiter(s) Oral at bedtime  trihexyphenidyl 2 milliGRAM(s) Oral three times a day      Physical Exam  General: Patient comfortable in bed  Vital Signs Last 12 Hrs  T(F): 98.2 (11-19-21 @ 11:27), Max: 98.3 (11-19-21 @ 04:08)  HR: 90 (11-19-21 @ 11:27) (90 - 124)  BP: 152/81 (11-19-21 @ 11:27) (150/89 - 152/81)  BP(mean): --  RR: 18 (11-19-21 @ 11:27) (18 - 18)  SpO2: 97% (11-19-21 @ 11:27) (94% - 97%)  Neck: No palpable thyroid nodules.  CVS: S1S2, No murmurs  Respiratory: No wheezing, no crepitations  GI: Abdomen soft, bowel sounds positive  Musculoskeletal:  edema lower extremities.   Skin: No skin rashes, no ecchymosis    Diagnostics    Free Thyroxine, Serum: AM Sched. Collection: 06-Nov-2021 06:00 (11-05 @ 13:18)  Free Thyroxine, Serum: AM Sched. Collection: 26-Oct-2021 06:00 (10-25 @ 11:45)  Thyroid Stimulating Hormone, Serum: AM Sched. Collection: 26-Oct-2021 06:00 (10-25 @ 11:45)  Cortisol AM, Serum: 08:00 (10-22 @ 12:47)  Free Thyroxine, Serum: AM Sched. Collection: 23-Oct-2021 06:00 (10-22 @ 12:43)  Thyroid Stimulating Hormone, Serum: AM Bridget. Collection: 23-Oct-2021 06:00 (10-22 @ 12:43)         Chief complaint  Patient is a 71y old  Male who presents with a chief complaint of leg swelling (19 Nov 2021 09:53)   Review of systems  Patient in bed, looks comfortable, no hypoglycemic episodes.    Labs and Fingersticks  CAPILLARY BLOOD GLUCOSE      POCT Blood Glucose.: 164 mg/dL (19 Nov 2021 12:00)  POCT Blood Glucose.: 140 mg/dL (19 Nov 2021 07:54)  POCT Blood Glucose.: 140 mg/dL (18 Nov 2021 21:37)  POCT Blood Glucose.: 98 mg/dL (18 Nov 2021 17:55)      Anion Gap, Serum: 16 (11-19 @ 05:47)  Anion Gap, Serum: 16 (11-18 @ 06:06)      Calcium, Total Serum: 9.3 (11-19 @ 05:47)  Calcium, Total Serum: 9.0 (11-18 @ 06:06)          11-19    134<L>  |  95<L>  |  28<H>  ----------------------------<  120<H>  3.5   |  23  |  3.97<H>    Ca    9.3      19 Nov 2021 05:47  Phos  3.3     11-19  Mg     1.9     11-19                          9.1    10.47 )-----------( 314      ( 19 Nov 2021 05:47 )             29.8     Medications  MEDICATIONS  (STANDING):  atorvastatin 80 milliGRAM(s) Oral at bedtime  carbidopa/levodopa  25/100 2.5 Tablet(s) Oral <User Schedule>  carbidopa/levodopa  25/100 2 Tablet(s) Oral <User Schedule>  chlorhexidine 4% Liquid 1 Application(s) Topical <User Schedule>  cholecalciferol 1000 Unit(s) Oral daily  dextrose 5%. 1000 milliLiter(s) (50 mL/Hr) IV Continuous <Continuous>  dextrose 5%. 1000 milliLiter(s) (100 mL/Hr) IV Continuous <Continuous>  dextrose 50% Injectable 25 Gram(s) IV Push once  dextrose 50% Injectable 25 Gram(s) IV Push once  dextrose 50% Injectable 12.5 Gram(s) IV Push once  folic acid 1 milliGRAM(s) Oral daily  glucagon  Injectable 1 milliGRAM(s) IntraMuscular once  insulin glargine Injectable (LANTUS) 6 Unit(s) SubCutaneous at bedtime  insulin lispro (ADMELOG) corrective regimen sliding scale   SubCutaneous three times a day before meals  insulin lispro (ADMELOG) corrective regimen sliding scale   SubCutaneous at bedtime  levothyroxine 25 MICROGram(s) Oral daily  memantine 5 milliGRAM(s) Oral at bedtime  metoprolol tartrate 25 milliGRAM(s) Oral two times a day  midodrine 20 milliGRAM(s) Oral <User Schedule>  Nephro-celeste 1 Tablet(s) Oral daily  polyethylene glycol 3350 17 Gram(s) Oral two times a day  QUEtiapine 12.5 milliGRAM(s) Oral daily  senna Syrup 10 milliLiter(s) Oral at bedtime  trihexyphenidyl 2 milliGRAM(s) Oral three times a day      Physical Exam  General: Patient comfortable in bed  Vital Signs Last 12 Hrs  T(F): 98.2 (11-19-21 @ 11:27), Max: 98.3 (11-19-21 @ 04:08)  HR: 90 (11-19-21 @ 11:27) (90 - 124)  BP: 152/81 (11-19-21 @ 11:27) (150/89 - 152/81)  BP(mean): --  RR: 18 (11-19-21 @ 11:27) (18 - 18)  SpO2: 97% (11-19-21 @ 11:27) (94% - 97%)  Neck: No palpable thyroid nodules.  CVS: S1S2, No murmurs  Respiratory: No wheezing, no crepitations  GI: Abdomen soft, bowel sounds positive  Musculoskeletal:  edema lower extremities.   Skin: No skin rashes, no ecchymosis    Diagnostics    Free Thyroxine, Serum: AM Sched. Collection: 06-Nov-2021 06:00 (11-05 @ 13:18)  Free Thyroxine, Serum: AM Sched. Collection: 26-Oct-2021 06:00 (10-25 @ 11:45)  Thyroid Stimulating Hormone, Serum: AM Sched. Collection: 26-Oct-2021 06:00 (10-25 @ 11:45)  Cortisol AM, Serum: 08:00 (10-22 @ 12:47)  Free Thyroxine, Serum: AM Sched. Collection: 23-Oct-2021 06:00 (10-22 @ 12:43)  Thyroid Stimulating Hormone, Serum: AM Bridget. Collection: 23-Oct-2021 06:00 (10-22 @ 12:43)

## 2021-11-19 NOTE — PROGRESS NOTE ADULT - SUBJECTIVE AND OBJECTIVE BOX
Follow-up Pulm Progress Note  Brayan Verma MD  [phone]    Breathing comforably on room air: sats in mid 90's  No new resp issues  US dopplers 11/17 negative for   DVT: small surgical site hematoma      Vital Signs Last 24 Hrs  T(C): 36.8 (19 Nov 2021 11:27), Max: 37.2 (18 Nov 2021 17:07)  T(F): 98.2 (19 Nov 2021 11:27), Max: 99 (18 Nov 2021 17:07)  HR: 90 (19 Nov 2021 11:27) (70 - 124)  BP: 152/81 (19 Nov 2021 11:27) (130/77 - 153/76)  BP(mean): --  RR: 18 (19 Nov 2021 11:27) (18 - 18)  SpO2: 97% (19 Nov 2021 11:27) (94% - 99%)                         9.1    10.47 )-----------( 314      ( 19 Nov 2021 05:47 )             29.8       11-19    134<L>  |  95<L>  |  28<H>  ----------------------------<  120<H>  3.5   |  23  |  3.97<H>    Ca    9.3      19 Nov 2021 05:47  Phos  3.3     11-19  Mg     1.9     11-19      Physical Examination:  PULM: Diminished basilar BS  CVS: Regular rate and rhythm, no murmurs, rubs, or gallops  ABD: Soft, non-tender  EXT:  No clubbing, cyanosis, or edema    RADIOLOGY REVIEWED  CXR:    CT chest:    TTE:

## 2021-11-19 NOTE — PROGRESS NOTE ADULT - PROBLEM SELECTOR PLAN 1
Work up and Rx of possible causes as per the primary team; however, it appears that after treating all possible causes, but being in the hospital, the patient continues to be Delirious.   Antipsychotics as per psych.   Will also advise:   -Frequent reassurance and verbal orientation   -Family members or other familiar persons by his bedside.   -Delusions and hallucinations should be neither endorsed nor challenged.   -Physical restraints should be avoided. Alternatives to restraint use, such as constant observation (preferably by someone familiar to the patient such as a family member), may be more effective.  -PT eval and early ambulation if possible

## 2021-11-19 NOTE — PROGRESS NOTE ADULT - SUBJECTIVE AND OBJECTIVE BOX
Patient is a 71y old  Male who presents with a chief complaint of leg swelling (19 Nov 2021 13:04)      INTERVAL HPI/OVERNIGHT EVENTS: noted  pt seen and examined this am   events noted-pulled out iv-on restraints now  sleepy, comfortable, denies any pain        Vital Signs Last 24 Hrs  T(C): 36.8 (19 Nov 2021 11:27), Max: 37.2 (18 Nov 2021 17:07)  T(F): 98.2 (19 Nov 2021 11:27), Max: 99 (18 Nov 2021 17:07)  HR: 90 (19 Nov 2021 11:27) (87 - 124)  BP: 152/81 (19 Nov 2021 11:27) (150/89 - 153/76)  BP(mean): --  RR: 18 (19 Nov 2021 11:27) (18 - 18)  SpO2: 97% (19 Nov 2021 11:27) (94% - 99%)    acetaminophen     Tablet .. 650 milliGRAM(s) Oral every 6 hours PRN  albuterol/ipratropium for Nebulization 3 milliLiter(s) Nebulizer every 6 hours PRN  atorvastatin 80 milliGRAM(s) Oral at bedtime  carbidopa/levodopa  25/100 2 Tablet(s) Oral <User Schedule>  carbidopa/levodopa  25/100 2.5 Tablet(s) Oral <User Schedule>  chlorhexidine 4% Liquid 1 Application(s) Topical <User Schedule>  cholecalciferol 1000 Unit(s) Oral daily  dextrose 5%. 1000 milliLiter(s) IV Continuous <Continuous>  dextrose 5%. 1000 milliLiter(s) IV Continuous <Continuous>  dextrose 50% Injectable 25 Gram(s) IV Push once  dextrose 50% Injectable 12.5 Gram(s) IV Push once  dextrose 50% Injectable 25 Gram(s) IV Push once  folic acid 1 milliGRAM(s) Oral daily  glucagon  Injectable 1 milliGRAM(s) IntraMuscular once  guaiFENesin Oral Liquid (Sugar-Free) 200 milliGRAM(s) Oral every 6 hours PRN  insulin glargine Injectable (LANTUS) 6 Unit(s) SubCutaneous at bedtime  insulin lispro (ADMELOG) corrective regimen sliding scale   SubCutaneous three times a day before meals  insulin lispro (ADMELOG) corrective regimen sliding scale   SubCutaneous at bedtime  levothyroxine 25 MICROGram(s) Oral daily  memantine 5 milliGRAM(s) Oral at bedtime  metoprolol tartrate 25 milliGRAM(s) Oral two times a day  midodrine 20 milliGRAM(s) Oral <User Schedule>  Nephro-celeste 1 Tablet(s) Oral daily  polyethylene glycol 3350 17 Gram(s) Oral two times a day  QUEtiapine 12.5 milliGRAM(s) Oral daily  QUEtiapine 25 milliGRAM(s) Oral every 6 hours PRN  senna Syrup 10 milliLiter(s) Oral at bedtime  sodium chloride 0.9% lock flush 10 milliLiter(s) IV Push every 1 hour PRN  trihexyphenidyl 2 milliGRAM(s) Oral three times a day      PHYSICAL EXAM:  GENERAL: NAD,   EYES: conjunctiva and sclera clear  ENMT: Moist mucous membranes  NECK: Supple, No JVD, Normal thyroid  CHEST/LUNG: non labored, cta b/l  HEART: Regular rate and rhythm; No murmurs, rubs, or gallops  ABDOMEN: Soft, Nontender, Nondistended; Bowel sounds present  EXTREMITIES:  2+ Peripheral Pulses, No clubbing, cyanosis, or edema  LYMPH: No lymphadenopathy noted  SKIN: No rashes or lesions    Consultant(s) Notes Reviewed:  [x ] YES  [ ] NO  Care Discussed with Consultants/Other Providers [ x] YES  [ ] NO    LABS:                        9.1    10.47 )-----------( 314      ( 19 Nov 2021 05:47 )             29.8     11-19    134<L>  |  95<L>  |  28<H>  ----------------------------<  120<H>  3.5   |  23  |  3.97<H>    Ca    9.3      19 Nov 2021 05:47  Phos  3.3     11-19  Mg     1.9     11-19          CAPILLARY BLOOD GLUCOSE      POCT Blood Glucose.: 164 mg/dL (19 Nov 2021 12:00)  POCT Blood Glucose.: 140 mg/dL (19 Nov 2021 07:54)  POCT Blood Glucose.: 140 mg/dL (18 Nov 2021 21:37)  POCT Blood Glucose.: 98 mg/dL (18 Nov 2021 17:55)              RADIOLOGY & ADDITIONAL TESTS:    Imaging Personally Reviewed:  [x ] YES  [ ] NO

## 2021-11-19 NOTE — PROGRESS NOTE ADULT - CONVERSATION DETAILS
d/w the patient's son and HCP (Yunier) that indicated GOC were for trying to improve the patient's condition. He stated, at this point, he was hoping for the patient being able to be DC to [facility] ([place] We also discussed about code status and he agreed with DNR but DNI. Hence the patient is to be intubated if needed. d/w the patient's son and HCP (Yunier) that indicated GOC were still for trying to improve the patient's condition. He stated, at this point, he was hoping for the patient to be able to be DC to [facility] ([place] However, he expressed concerns about the patient's future care at home if he were to continue to be delirious and with significant functional impairment. I validated his son's concerns and advised him to get info about medicaid services. I gave him direction to our financial debarment as well as advised him to contact and elder care  that may be able to advise him regarding this matter.     We also discussed about code status and he agreed with DNR but DNI. Hence the patient is to be intubated if needed. A MOLST form was completed.

## 2021-11-19 NOTE — PROGRESS NOTE ADULT - ASSESSMENT
full note to f/u.   See GO note above.           Dennis Juarez MD   Geriatrics and Palliative Care (GAP) Consult Service    of Geriatric and Palliative Medicine  [facility]      Please page the following number for clinical matters between the hours of 9 am and 5 pm from Monday through Friday : [phone]    After 5pm and on weekends, please see the contact information below:    In the event of newly developing, evolving, or worsening symptoms, please contact the Palliative Medicine team via pager (if the patient is at [facility] #[phone] or if the patient is at [facility] #[phone]) The Geriatric and Palliative Medicine service has coverage 24 hours a day/ 7 days a week to provide medical recommendations regarding symptom management needs via telephone   70yo M w/  CAD (s/p CABG 2019), CKD (unknown stage), DM2, Parkinson's Disease, HTN, depression p/w b/l LE edema found to have ARF and NSTEMI w/ new Aflutter started on heparin gtt and HD. Course c/b RP bleed w/ anemia, admitted to the MICU for closer monitoring while on HD. Also with fluctuating mental status and confusion. Palliative was re-consulted for GOC.

## 2021-11-19 NOTE — PROGRESS NOTE ADULT - PROBLEM SELECTOR PLAN 4
Since GOC and ACP are defined, I will sign off.         Dennis Juarez MD   Geriatrics and Palliative Care (GAP) Consult Service    of Geriatric and Palliative Medicine  [facility]      Please page the following number for clinical matters between the hours of 9 am and 5 pm from Monday through Friday : [phone]    After 5pm and on weekends, please see the contact information below:    In the event of newly developing, evolving, or worsening symptoms, please contact the Palliative Medicine team via pager (if the patient is at [facility] #[phone] or if the patient is at [facility] #[phone]) The Geriatric and Palliative Medicine service has coverage 24 hours a day/ 7 days a week to provide medical recommendations regarding symptom management needs via telephone

## 2021-11-19 NOTE — PROGRESS NOTE ADULT - ASSESSMENT
Assessment  DMT2: 71y Male with DM T2 with hyperglycemia, A1C 6.4%, was on insulin at home, now on low-dose basal insulin and coverage, blood sugars are stable and trending within acceptable range, no  hypoglycemic episodes. Patient is eating partial meals, RD eval reviewed for malnutrition, very confused, NAD.  Hypothyroidism: Patient has no history thyroid disease, was not on any thyroid supplements, subclinical with low-normal FT4, lethargic, started on synthroid 25 mcg po daily, FT4 improved to 1.2.  CHF: on medications, stable, monitored.  HTN: Controlled,  on antihypertensive medications.  Parkinsons: on meds, monitored.  CKD: Monitor labs/BMP      Kelsie Reece MD  Cell: [phone]  Office: [phone]     Assessment  DMT2: 71y Male with DM T2 with hyperglycemia, A1C 6.4%, was on insulin at home, now on low-dose basal insulin and coverage, blood sugars are stable and trending within acceptable range, no  hypoglycemic episodes. Patient is eating partial meals, RD eval reviewed  for malnutrition, very confused, NAD.  Hypothyroidism: Patient has no history thyroid disease, was not on any thyroid supplements, subclinical with low-normal FT4, lethargic, started on synthroid 25 mcg po daily, FT4 improved to 1.2.  CHF: on medications, stable, monitored.  HTN: Controlled,  on antihypertensive medications.  Parkinsons: on meds, monitored.  CKD: Monitor labs/BMP      Kelsie Reece MD  Cell: [phone]  Office: [phone]

## 2021-11-19 NOTE — PROGRESS NOTE ADULT - SUBJECTIVE AND OBJECTIVE BOX
NEPHROLOGY     Patient seen and examined.    MEDICATIONS  (STANDING):  atorvastatin 80 milliGRAM(s) Oral at bedtime  carbidopa/levodopa  25/100 2 Tablet(s) Oral <User Schedule>  carbidopa/levodopa  25/100 2.5 Tablet(s) Oral <User Schedule>  chlorhexidine 4% Liquid 1 Application(s) Topical <User Schedule>  cholecalciferol 1000 Unit(s) Oral daily  dextrose 5%. 1000 milliLiter(s) (50 mL/Hr) IV Continuous <Continuous>  dextrose 5%. 1000 milliLiter(s) (100 mL/Hr) IV Continuous <Continuous>  dextrose 50% Injectable 12.5 Gram(s) IV Push once  dextrose 50% Injectable 25 Gram(s) IV Push once  dextrose 50% Injectable 25 Gram(s) IV Push once  folic acid 1 milliGRAM(s) Oral daily  glucagon  Injectable 1 milliGRAM(s) IntraMuscular once  insulin glargine Injectable (LANTUS) 6 Unit(s) SubCutaneous at bedtime  insulin lispro (ADMELOG) corrective regimen sliding scale   SubCutaneous three times a day before meals  insulin lispro (ADMELOG) corrective regimen sliding scale   SubCutaneous at bedtime  levothyroxine 25 MICROGram(s) Oral daily  memantine 5 milliGRAM(s) Oral at bedtime  metoprolol tartrate 25 milliGRAM(s) Oral two times a day  midodrine 20 milliGRAM(s) Oral <User Schedule>  Nephro-celeste 1 Tablet(s) Oral daily  polyethylene glycol 3350 17 Gram(s) Oral two times a day  QUEtiapine 12.5 milliGRAM(s) Oral daily  senna Syrup 10 milliLiter(s) Oral at bedtime  trihexyphenidyl 2 milliGRAM(s) Oral three times a day    VITALS:  T(C): , Max: 37.2 (11-18-21 @ 17:07)  T(F): , Max: 99 (11-18-21 @ 17:07)  HR: 124 (11-19-21 @ 04:08)  BP: 150/89 (11-19-21 @ 04:08)  RR: 18 (11-19-21 @ 04:08)  SpO2: 94% (11-19-21 @ 04:08)    I and O's:    11-18 @ 07:01  -  11-19 @ 07:00  --------------------------------------------------------  IN: 160 mL / OUT: 1600 mL / NET: -1440 mL    11-19 @ 07:01  -  11-19 @ 09:53  --------------------------------------------------------  IN: 120 mL / OUT: 0 mL / NET: 120 mL    PHYSICAL EXAM:  Constitutional: NAD, confused   Neck:  No JVD  Respiratory: CTAB/L  Cardiovascular: S1 and S2  Gastrointestinal: BS+, soft, NT/ND  Extremities: LUE swelling,  Neurological: limited  Psychiatric: Normal mood, normal affect  : No Javier  Skin: No rashes  Access: ERASTOE AVF; perm cath    LABS:                        9.1    10.47 )-----------( 314      ( 19 Nov 2021 05:47 )             29.8     11-19    134<L>  |  95<L>  |  28<H>  ----------------------------<  120<H>  3.5   |  23  |  3.97<H>    Ca    9.3      19 Nov 2021 05:47  Phos  3.3     11-19  Mg     1.9     11-19   NEPHROLOGY     Patient seen and examined. Pt resting comfortably, no complaints, in no acute distress. HD yesterday removed 1.9L.     MEDICATIONS  (STANDING):  atorvastatin 80 milliGRAM(s) Oral at bedtime  carbidopa/levodopa  25/100 2 Tablet(s) Oral <User Schedule>  carbidopa/levodopa  25/100 2.5 Tablet(s) Oral <User Schedule>  chlorhexidine 4% Liquid 1 Application(s) Topical <User Schedule>  cholecalciferol 1000 Unit(s) Oral daily  dextrose 5%. 1000 milliLiter(s) (50 mL/Hr) IV Continuous <Continuous>  dextrose 5%. 1000 milliLiter(s) (100 mL/Hr) IV Continuous <Continuous>  dextrose 50% Injectable 12.5 Gram(s) IV Push once  dextrose 50% Injectable 25 Gram(s) IV Push once  dextrose 50% Injectable 25 Gram(s) IV Push once  folic acid 1 milliGRAM(s) Oral daily  glucagon  Injectable 1 milliGRAM(s) IntraMuscular once  insulin glargine Injectable (LANTUS) 6 Unit(s) SubCutaneous at bedtime  insulin lispro (ADMELOG) corrective regimen sliding scale   SubCutaneous three times a day before meals  insulin lispro (ADMELOG) corrective regimen sliding scale   SubCutaneous at bedtime  levothyroxine 25 MICROGram(s) Oral daily  memantine 5 milliGRAM(s) Oral at bedtime  metoprolol tartrate 25 milliGRAM(s) Oral two times a day  midodrine 20 milliGRAM(s) Oral <User Schedule>  Nephro-celeste 1 Tablet(s) Oral daily  polyethylene glycol 3350 17 Gram(s) Oral two times a day  QUEtiapine 12.5 milliGRAM(s) Oral daily  senna Syrup 10 milliLiter(s) Oral at bedtime  trihexyphenidyl 2 milliGRAM(s) Oral three times a day    VITALS:  T(C): , Max: 37.2 (11-18-21 @ 17:07)  T(F): , Max: 99 (11-18-21 @ 17:07)  HR: 124 (11-19-21 @ 04:08)  BP: 150/89 (11-19-21 @ 04:08)  RR: 18 (11-19-21 @ 04:08)  SpO2: 94% (11-19-21 @ 04:08)    I and O's:    11-18 @ 07:01  -  11-19 @ 07:00  --------------------------------------------------------  IN: 160 mL / OUT: 1600 mL / NET: -1440 mL    11-19 @ 07:01 - 11-19 @ 09:53  --------------------------------------------------------  IN: 120 mL / OUT: 0 mL / NET: 120 mL    PHYSICAL EXAM:  Constitutional: NAD, confused   Neck:  No JVD  Respiratory: CTAB/L  Cardiovascular: S1 and S2  Gastrointestinal: BS+, soft, NT/ND  Extremities: LUE swelling,  Neurological: limited  Psychiatric: Normal mood, normal affect  : No Javier  Skin: No rashes  Access: LUE AVF; perm cath    LABS:                        9.1    10.47 )-----------( 314      ( 19 Nov 2021 05:47 )             29.8     11-19    134<L>  |  95<L>  |  28<H>  ----------------------------<  120<H>  3.5   |  23  |  3.97<H>    Ca    9.3      19 Nov 2021 05:47  Phos  3.3     11-19  Mg     1.9     11-19

## 2021-11-19 NOTE — PROGRESS NOTE ADULT - PROBLEM SELECTOR PLAN 2
Now on HD which is another challenge, particularly in the setting of Delirium and functional impairment, for when and if he is able to return home. However, his son understand this challenges and is hoping for his father being able to improve his functionality and Delirium. See GOC above.

## 2021-11-19 NOTE — PROGRESS NOTE ADULT - SUBJECTIVE AND OBJECTIVE BOX
SUBJECTIVE AND OBJECTIVE:  INTERVAL HPI/OVERNIGHT EVENTS:    DNR on chart:   Allergies    adhesives (Rash)  latex (Urticaria)  No Known Drug Allergies    Intolerances    MEDICATIONS  (STANDING):  atorvastatin 80 milliGRAM(s) Oral at bedtime  carbidopa/levodopa  25/100 2.5 Tablet(s) Oral <User Schedule>  carbidopa/levodopa  25/100 2 Tablet(s) Oral <User Schedule>  chlorhexidine 4% Liquid 1 Application(s) Topical <User Schedule>  cholecalciferol 1000 Unit(s) Oral daily  dextrose 5%. 1000 milliLiter(s) (50 mL/Hr) IV Continuous <Continuous>  dextrose 5%. 1000 milliLiter(s) (100 mL/Hr) IV Continuous <Continuous>  dextrose 50% Injectable 25 Gram(s) IV Push once  dextrose 50% Injectable 12.5 Gram(s) IV Push once  dextrose 50% Injectable 25 Gram(s) IV Push once  folic acid 1 milliGRAM(s) Oral daily  glucagon  Injectable 1 milliGRAM(s) IntraMuscular once  insulin glargine Injectable (LANTUS) 6 Unit(s) SubCutaneous at bedtime  insulin lispro (ADMELOG) corrective regimen sliding scale   SubCutaneous three times a day before meals  insulin lispro (ADMELOG) corrective regimen sliding scale   SubCutaneous at bedtime  levothyroxine 25 MICROGram(s) Oral daily  memantine 5 milliGRAM(s) Oral at bedtime  metoprolol tartrate 25 milliGRAM(s) Oral two times a day  midodrine 20 milliGRAM(s) Oral <User Schedule>  Nephro-celeste 1 Tablet(s) Oral daily  polyethylene glycol 3350 17 Gram(s) Oral two times a day  QUEtiapine 12.5 milliGRAM(s) Oral daily  senna Syrup 10 milliLiter(s) Oral at bedtime  trihexyphenidyl 2 milliGRAM(s) Oral three times a day    MEDICATIONS  (PRN):  acetaminophen     Tablet .. 650 milliGRAM(s) Oral every 6 hours PRN Mild Pain (1 - 3)  albuterol/ipratropium for Nebulization 3 milliLiter(s) Nebulizer every 6 hours PRN Shortness of Breath and/or Wheezing  guaiFENesin Oral Liquid (Sugar-Free) 200 milliGRAM(s) Oral every 6 hours PRN Cough  QUEtiapine 25 milliGRAM(s) Oral every 6 hours PRN Agitation  sodium chloride 0.9% lock flush 10 milliLiter(s) IV Push every 1 hour PRN Pre/post blood products, medications, blood draw, and to maintain line patency      ITEMS UNCHECKED ARE NOT PRESENT    PRESENT SYMPTOMS: [ ]Unable to obtain due to poor mentation   Source if other than patient:  [ ]Family   [ ]Team     Pain:  [ ]yes [ ]no  QOL impact -   Location -                    Aggravating factors -  Quality -  Radiation -  Timing-  Severity (0-10 scale):  Minimal acceptable level (0-10 scale):     Dyspnea:                           [ ]Mild [ ]Moderate [ ]Severe  Anxiety:                             [ ]Mild [ ]Moderate [ ]Severe  Fatigue:                             [ ]Mild [ ]Moderate [ ]Severe  Nausea:                             [ ]Mild [ ]Moderate [ ]Severe  Loss of appetite:              [ ]Mild [ ]Moderate [ ]Severe  Constipation:                    [ ]Mild [ ]Moderate [ ]Severe    CPOT:    https://www.[facility].org/getattachment/[email or web address]/Critical-Care-Pain-Observation-Tool-(CPOT)    PAIN AD Score:	  http://geriatrictoolkit.[facility]/cog/painad.pdf (Ctrl + left click to view)    Other Symptoms:  [ ]All other review of systems negative     Palliative Performance Status Version 2:         %      http://npcrc.org/files/news/palliative_performance_scale_ppsv2.pdf  PHYSICAL EXAM:  Vital Signs Last 24 Hrs  T(C): 36.8 (19 Nov 2021 11:27), Max: 37.1 (18 Nov 2021 21:33)  T(F): 98.2 (19 Nov 2021 11:27), Max: 98.7 (18 Nov 2021 21:33)  HR: 87 (19 Nov 2021 17:07) (87 - 124)  BP: 129/74 (19 Nov 2021 17:07) (129/74 - 153/75)  BP(mean): --  RR: 18 (19 Nov 2021 11:27) (18 - 18)  SpO2: 97% (19 Nov 2021 11:27) (94% - 99%) I&O's Summary    18 Nov 2021 07:01  -  19 Nov 2021 07:00  --------------------------------------------------------  IN: 160 mL / OUT: 1600 mL / NET: -1440 mL    19 Nov 2021 07:01  -  19 Nov 2021 18:56  --------------------------------------------------------  IN: 680 mL / OUT: 0 mL / NET: 680 mL       GENERAL:  [ ]Alert  [ ]Oriented x   [ ]Lethargic  [ ]Cachexia  [ ]Unarousable  [ ]Verbal  [ ]Non-Verbal  Behavioral:   [ ]Anxiety  [ ]Delirium [ ]Agitation [ ]Other  HEENT:  [ ]Normal   [ ]Dry mouth   [ ]ET Tube/Trach  [ ]Oral lesions  PULMONARY:   [ ]Clear [ ]Tachypnea  [ ]Audible excessive secretions   [ ]Rhonchi        [ ]Right [ ]Left [ ]Bilateral  [ ]Crackles        [ ]Right [ ]Left [ ]Bilateral  [ ]Wheezing     [ ]Right [ ]Left [ ]Bilateral  [ ]Diminished BS [ ] Right [ ]Left [ ]Bilateral  CARDIOVASCULAR:    [ ]Regular [ ]Irregular [ ]Tachy  [ ]Trevon [ ]Murmur [ ]Other  GASTROINTESTINAL:  [ ]Soft  [ ]Distended   [ ]+BS  [ ]Non tender [ ]Tender  [ ]PEG [ ]OGT/ NGT   Last BM:    GENITOURINARY:  [ ]Normal [ ]Incontinent   [ ]Oliguria/Anuria   [ ]Javier  MUSCULOSKELETAL:   [ ]Normal   [ ]Weakness  [ ]Bed/Wheelchair bound [ ]Edema  NEUROLOGIC:   [ ]No focal deficits  [ ] Cognitive impairment  [ ] Dysphagia [ ]Dysarthria [ ] Paresis [ ]Other   SKIN:   [ ]Normal  [ ]Rash   [ ]Pressure ulcer(s) [ ]y [ ]n present on admission    CRITICAL CARE:  [ ]Shock Present  [ ]Septic [ ]Cardiogenic [ ]Neurologic [ ]Hypovolemic  [ ]Vasopressors [ ]Inotropes  [ ]Respiratory failure present [ ]Mechanical Ventilation [ ]Non-invasive ventilatory support [ ]High-Flow   [ ]Acute  [ ]Chronic [ ]Hypoxic  [ ]Hypercarbic [ ]Other  [ ]Other organ failure     LABS:                        9.1    10.47 )-----------( 314      ( 19 Nov 2021 05:47 )             29.8   11-19    134<L>  |  95<L>  |  28<H>  ----------------------------<  120<H>  3.5   |  23  |  3.97<H>    Ca    9.3      19 Nov 2021 05:47  Phos  3.3     11-19  Mg     1.9     11-19          RADIOLOGY & ADDITIONAL STUDIES:    Protein Calorie Malnutrition Present: [ ]mild [ ]moderate [ ]severe [ ]underweight [ ]morbid obesity  https://www.andeal.org/vault/2440/web/files/ONC/Table_Clinical%20Characteristics%20to%20Document%20Malnutrition-White%20JV%20et%20al%202012.pdf    Height (cm): 180.3 (10-27-21 @ 21:30)  Weight (kg): 96.4 (10-27-21 @ 21:30)  BMI (kg/m2): 29.7 (10-27-21 @ 21:30)    [ ]PPSV2 < or = 30%  [ ]significant weight loss [ ]poor nutritional intake [ ]anasarca    [ ]Artificial Nutrition    REFERRALS:   [ ]Chaplaincy  [ ]Hospice  [ ]Child Life  [ ]Social Work  [ ]Case management [ ]Holistic Therapy     Goals of Care Document:     HPI: 72yo M w/  CAD (s/p CABG 2019), CKD (unknown stage), DM2, Parkinson's Disease, HTN, depression p/w b/l LE edema found to have ARF and NSTEMI w/ new Aflutter started on heparin gtt and HD. Course c/b RP bleed w/ anemia, admitted to the MICU for closer monitoring while on HD. Also with fluctuating mental status and confusion. Palliative was re-consulted for GOC.     SUBJECTIVE AND OBJECTIVE: The patient was confused. He was on wrist restrains.   INTERVAL HPI/OVERNIGHT EVENTS: No new events.     DNR on chart:   Allergies    adhesives (Rash)  latex (Urticaria)  No Known Drug Allergies    Intolerances    MEDICATIONS  (STANDING):  atorvastatin 80 milliGRAM(s) Oral at bedtime  carbidopa/levodopa  25/100 2.5 Tablet(s) Oral <User Schedule>  carbidopa/levodopa  25/100 2 Tablet(s) Oral <User Schedule>  chlorhexidine 4% Liquid 1 Application(s) Topical <User Schedule>  cholecalciferol 1000 Unit(s) Oral daily  dextrose 5%. 1000 milliLiter(s) (50 mL/Hr) IV Continuous <Continuous>  dextrose 5%. 1000 milliLiter(s) (100 mL/Hr) IV Continuous <Continuous>  dextrose 50% Injectable 25 Gram(s) IV Push once  dextrose 50% Injectable 12.5 Gram(s) IV Push once  dextrose 50% Injectable 25 Gram(s) IV Push once  folic acid 1 milliGRAM(s) Oral daily  glucagon  Injectable 1 milliGRAM(s) IntraMuscular once  insulin glargine Injectable (LANTUS) 6 Unit(s) SubCutaneous at bedtime  insulin lispro (ADMELOG) corrective regimen sliding scale   SubCutaneous three times a day before meals  insulin lispro (ADMELOG) corrective regimen sliding scale   SubCutaneous at bedtime  levothyroxine 25 MICROGram(s) Oral daily  memantine 5 milliGRAM(s) Oral at bedtime  metoprolol tartrate 25 milliGRAM(s) Oral two times a day  midodrine 20 milliGRAM(s) Oral <User Schedule>  Nephro-celeste 1 Tablet(s) Oral daily  polyethylene glycol 3350 17 Gram(s) Oral two times a day  QUEtiapine 12.5 milliGRAM(s) Oral daily  senna Syrup 10 milliLiter(s) Oral at bedtime  trihexyphenidyl 2 milliGRAM(s) Oral three times a day    MEDICATIONS  (PRN):  acetaminophen     Tablet .. 650 milliGRAM(s) Oral every 6 hours PRN Mild Pain (1 - 3)  albuterol/ipratropium for Nebulization 3 milliLiter(s) Nebulizer every 6 hours PRN Shortness of Breath and/or Wheezing  guaiFENesin Oral Liquid (Sugar-Free) 200 milliGRAM(s) Oral every 6 hours PRN Cough  QUEtiapine 25 milliGRAM(s) Oral every 6 hours PRN Agitation  sodium chloride 0.9% lock flush 10 milliLiter(s) IV Push every 1 hour PRN Pre/post blood products, medications, blood draw, and to maintain line patency      ITEMS UNCHECKED ARE NOT PRESENT    PRESENT SYMPTOMS: [ x]Unable to obtain due to poor mentation   Source if other than patient:  [ ]Family   [ ]Team     Pain:  [ ]yes [x] no  QOL impact -   Location -                    Aggravating factors -  Quality -  Radiation -  Timing-  Severity (0-10 scale):  Minimal acceptable level (0-10 scale):     Dyspnea:                           [ ]Mild [ ]Moderate [ ]Severe  Anxiety:                             [ ]Mild [ ]Moderate [ ]Severe  Fatigue:                             [ ]Mild [ ]Moderate [ ]Severe  Nausea:                             [ ]Mild [ ]Moderate [ ]Severe  Loss of appetite:              [ ]Mild [ ]Moderate [ ]Severe  Constipation:                    [ ]Mild [ ]Moderate [ ]Severe    CPOT:    https://www.[facility].org/getattachment/[email or web address]/Critical-Care-Pain-Observation-Tool-(CPOT)    PAIN AD Score:	  http://geriatrictoolkit.[facility]/cog/painad.pdf (Ctrl + left click to view)    Other Symptoms:  [ ]All other review of systems negative     Palliative Performance Status Version 2:   40      %      http://npcrc.org/files/news/palliative_performance_scale_ppsv2.pdf  PHYSICAL EXAM:  Vital Signs Last 24 Hrs  T(C): 36.8 (19 Nov 2021 11:27), Max: 37.1 (18 Nov 2021 21:33)  T(F): 98.2 (19 Nov 2021 11:27), Max: 98.7 (18 Nov 2021 21:33)  HR: 87 (19 Nov 2021 17:07) (87 - 124)  BP: 129/74 (19 Nov 2021 17:07) (129/74 - 153/75)  BP(mean): --  RR: 18 (19 Nov 2021 11:27) (18 - 18)  SpO2: 97% (19 Nov 2021 11:27) (94% - 99%) I&O's Summary    18 Nov 2021 07:01  -  19 Nov 2021 07:00  --------------------------------------------------------  IN: 160 mL / OUT: 1600 mL / NET: -1440 mL    19 Nov 2021 07:01  -  19 Nov 2021 18:56  --------------------------------------------------------  IN: 680 mL / OUT: 0 mL / NET: 680 mL       GENERAL:  [x ]Alert  [ x]Oriented x 1   [ ]Lethargic  [ ]Cachexia  [ ]Unarousable  [ ]Verbal  [ ]Non-Verbal  Behavioral:   [ ]Anxiety  [x ]Delirium [ ]Agitation [ ]Other  HEENT:  [ ]Normal   [ ]Dry mouth   [ ]ET Tube/Trach  [ ]Oral lesions  PULMONARY:   [x ]Clear [ ]Tachypnea  [ ]Audible excessive secretions   [ ]Rhonchi        [ ]Right [ ]Left [ ]Bilateral  [ ]Crackles        [ ]Right [ ]Left [ ]Bilateral  [ ]Wheezing     [ ]Right [ ]Left [ ]Bilateral  [ ]Diminished BS [ ] Right [ ]Left [ ]Bilateral  CARDIOVASCULAR:    [x ]Regular [e ]Irregular [ ]Tachy  [ ]Trevon [ ]Murmur [ ]Other  GASTROINTESTINAL:  [ x]Soft  [ ]Distended   [x ]+BS  [x ]Non tender [ ]Tender  [ ]PEG [ ]OGT/ NGT   Last BM:    GENITOURINARY:  [ ]Normal [ ]Incontinent   [x ]Oliguria/Anuria   [ ]Javier  MUSCULOSKELETAL:   [ ]Normal   [ x]Weakness  [ ]Bed/Wheelchair bound [ ]Edema  NEUROLOGIC:   [ ]No focal deficits  [ ] Cognitive impairment  [ ] Dysphagia [ ]Dysarthria [ ] Paresis [ x]Other: Delirium   SKIN:   [ ]Normal  [ ]Rash   [ ]Pressure ulcer(s) [ ]y [ ]n present on admission  [x] Lesion over BL LE as per nursing flow sheets.   CRITICAL CARE:  [ ]Shock Present  [ ]Septic [ ]Cardiogenic [ ]Neurologic [ ]Hypovolemic  [ ]Vasopressors [ ]Inotropes  [ ]Respiratory failure present [ ]Mechanical Ventilation [ ]Non-invasive ventilatory support [ ]High-Flow   [ ]Acute  [ ]Chronic [ ]Hypoxic  [ ]Hypercarbic [ ]Other  [ ]Other organ failure     LABS:                        9.1    10.47 )-----------( 314      ( 19 Nov 2021 05:47 )             29.8   11-19    134<L>  |  95<L>  |  28<H>  ----------------------------<  120<H>  3.5   |  23  |  3.97<H>    Ca    9.3      19 Nov 2021 05:47  Phos  3.3     11-19  Mg     1.9     11-19          RADIOLOGY & ADDITIONAL STUDIES:  < from: CT Head No Cont (10.27.21 @ 17:04) >    EXAM:  CT BRAIN                            PROCEDURE DATE:  10/27/2021  IMPRESSION:  No significant change from the prior exam.    No intracranial hemorrhage.    Right parietal convexity meningioma without significant interval change.    --- End of Report ---                BOBBY OTT MD; Attending Radiologist  This document has been electronically signed. Oct 27 2021  5:30PMAreas of white matter hypodensity are seen within the cerebral hemispheres bilaterally compatible with moderate microvascular-type changes. No mass, blood or abnormal attenuation is otherwise seen.    < end of copied text >    Protein Calorie Malnutrition Present: [ ]mild [ ]moderate [ ]severe [ ]underweight [ ]morbid obesity  https://www.andeal.org/vault/2440/web/files/ONC/Table_Clinical%20Characteristics%20to%20Document%20Malnutrition-White%20JV%20et%20al%202012.pdf    Height (cm): 180.3 (10-27-21 @ 21:30)  Weight (kg): 96.4 (10-27-21 @ 21:30)  BMI (kg/m2): 29.7 (10-27-21 @ 21:30)    [ ]PPSV2 < or = 30%  [ ]significant weight loss [ ]poor nutritional intake [ ]anasarca    [ ]Artificial Nutrition    REFERRALS:   [ ]Chaplaincy  [ ]Hospice  [ ]Child Life  [ ]Social Work  [ ]Case management [ ]Holistic Therapy     Goals of Care Document:

## 2021-11-19 NOTE — PROGRESS NOTE ADULT - ASSESSMENT
70yo M w/ PMHx of CAD (s/p CABG 2019), CKD (unknown stage), DM2, Parkinson's Disease, HTN, depression presents with new onset acute heart failure exacerbation, NSTEMI, started on hep gtt, developed bl RP bleed, acute anemia. sp MICU course for hemorrhagic shock, 10/28 sp IR embolization l4 and l5 lumbar artery. for permacath and AVF.    # Acute systolic and diastolic heart failure, improved  # NSTEMI- conservative mgmt dt renal function and anemia-bld loss  # ESRD, new HD  # Atrial flutter  # acute hypoxic resp failure, improved  # hemorrhagic shock, left RP hematoma, acute blood loss anemia  # lupus anticoagulant +  Echo TTE mod to severe LV SD, hypokinesis anterior wall, not candidate for cath at this point  HD per renal  10/28 sp IR embolization l4 and l5 lumbar artery  on midodrine during dialysis  WBC stable, improved overall  sp permacath placement  sp L AVF  new left UE swelling, hematoma noted on doppler US   concern for bleeding with drop in hgb, transfuse 1 unit prbc and 2 unit FFP  LUE doppler noted  vascular following- ace wrapping, pulses palpable    # DM2 (diabetes mellitus, type 2)  per endo  hba1c 6.4    # CAD (coronary artery disease)  # HTN  cont lopressor  c/w atorvastatin  asa on hold    # Parkinsons disease   # delirium  at baseline pt is poor historian but remains confused  remains on restraints as pulling at permacath  psych recs seroquel and olanzpine prn if agitated  carbidopa/levodopa   c/w trihexyphenidyl  psych fu to optimize meds and assist with dc to rehab    PCP Dr. Simons    [facility]  [phone]

## 2021-11-20 NOTE — PROGRESS NOTE ADULT - SUBJECTIVE AND OBJECTIVE BOX
Chief complaint    Patient is a 71y old  Male who presents with a chief complaint of leg swelling (20 Nov 2021 14:16)   Review of systems  Patient in bed, appears comfortable.    Labs and Fingersticks  CAPILLARY BLOOD GLUCOSE      POCT Blood Glucose.: 155 mg/dL (20 Nov 2021 12:01)  POCT Blood Glucose.: 136 mg/dL (20 Nov 2021 07:47)  POCT Blood Glucose.: 148 mg/dL (19 Nov 2021 22:25)      Anion Gap, Serum: 16 (11-19 @ 05:47)      Calcium, Total Serum: 9.3 (11-19 @ 05:47)          11-19    134<L>  |  95<L>  |  28<H>  ----------------------------<  120<H>  3.5   |  23  |  3.97<H>    Ca    9.3      19 Nov 2021 05:47  Phos  4.4     11-20  Mg     2.2     11-20                          8.6    10.65 )-----------( 359      ( 20 Nov 2021 05:42 )             27.8     Medications  MEDICATIONS  (STANDING):  atorvastatin 80 milliGRAM(s) Oral at bedtime  carbidopa/levodopa  25/100 2 Tablet(s) Oral <User Schedule>  carbidopa/levodopa  25/100 2.5 Tablet(s) Oral <User Schedule>  chlorhexidine 4% Liquid 1 Application(s) Topical <User Schedule>  cholecalciferol 1000 Unit(s) Oral daily  dextrose 5%. 1000 milliLiter(s) (50 mL/Hr) IV Continuous <Continuous>  dextrose 5%. 1000 milliLiter(s) (100 mL/Hr) IV Continuous <Continuous>  dextrose 50% Injectable 25 Gram(s) IV Push once  dextrose 50% Injectable 25 Gram(s) IV Push once  dextrose 50% Injectable 12.5 Gram(s) IV Push once  folic acid 1 milliGRAM(s) Oral daily  glucagon  Injectable 1 milliGRAM(s) IntraMuscular once  insulin glargine Injectable (LANTUS) 6 Unit(s) SubCutaneous at bedtime  insulin lispro (ADMELOG) corrective regimen sliding scale   SubCutaneous three times a day before meals  insulin lispro (ADMELOG) corrective regimen sliding scale   SubCutaneous at bedtime  levothyroxine 25 MICROGram(s) Oral daily  memantine 5 milliGRAM(s) Oral at bedtime  metoprolol tartrate 25 milliGRAM(s) Oral two times a day  midodrine 10 milliGRAM(s) Oral <User Schedule>  Nephro-celeste 1 Tablet(s) Oral daily  polyethylene glycol 3350 17 Gram(s) Oral two times a day  QUEtiapine 12.5 milliGRAM(s) Oral daily  senna Syrup 10 milliLiter(s) Oral at bedtime  trihexyphenidyl 2 milliGRAM(s) Oral three times a day      Physical Exam  General: Patient comfortable in bed  Vital Signs Last 12 Hrs  T(F): 98.2 (11-20-21 @ 12:17), Max: 98.2 (11-20-21 @ 12:17)  HR: 106 (11-20-21 @ 12:17) (93 - 106)  BP: 153/83 (11-20-21 @ 12:17) (126/75 - 153/83)  BP(mean): --  RR: 18 (11-20-21 @ 12:17) (18 - 18)  SpO2: 100% (11-20-21 @ 12:17) (96% - 100%)  Neck: No palpable thyroid nodules.  CVS: S1S2, No murmurs  Respiratory: No wheezing, no crepitations  GI: Abdomen soft, bowel sounds positive  Musculoskeletal:  edema lower extremities.     Diagnostics

## 2021-11-20 NOTE — PROGRESS NOTE ADULT - SUBJECTIVE AND OBJECTIVE BOX
Patient is a 71y old  Male who presents with a chief complaint of leg swelling (20 Nov 2021 13:11)      INTERVAL HPI/OVERNIGHT EVENTS: noted  pt seen and examined this am   events noted  feels well  alert and awake, no furthur episodes of agitaion  on enhanced care      Vital Signs Last 24 Hrs  T(C): 36.8 (20 Nov 2021 12:17), Max: 36.8 (19 Nov 2021 20:52)  T(F): 98.2 (20 Nov 2021 12:17), Max: 98.3 (19 Nov 2021 20:52)  HR: 106 (20 Nov 2021 12:17) (87 - 106)  BP: 153/83 (20 Nov 2021 12:17) (126/75 - 153/83)  BP(mean): --  RR: 18 (20 Nov 2021 12:17) (18 - 18)  SpO2: 100% (20 Nov 2021 12:17) (96% - 100%)    acetaminophen     Tablet .. 650 milliGRAM(s) Oral every 6 hours PRN  albuterol/ipratropium for Nebulization 3 milliLiter(s) Nebulizer every 6 hours PRN  atorvastatin 80 milliGRAM(s) Oral at bedtime  carbidopa/levodopa  25/100 2.5 Tablet(s) Oral <User Schedule>  carbidopa/levodopa  25/100 2 Tablet(s) Oral <User Schedule>  chlorhexidine 4% Liquid 1 Application(s) Topical <User Schedule>  cholecalciferol 1000 Unit(s) Oral daily  dextrose 5%. 1000 milliLiter(s) IV Continuous <Continuous>  dextrose 5%. 1000 milliLiter(s) IV Continuous <Continuous>  dextrose 50% Injectable 25 Gram(s) IV Push once  dextrose 50% Injectable 12.5 Gram(s) IV Push once  dextrose 50% Injectable 25 Gram(s) IV Push once  folic acid 1 milliGRAM(s) Oral daily  glucagon  Injectable 1 milliGRAM(s) IntraMuscular once  guaiFENesin Oral Liquid (Sugar-Free) 200 milliGRAM(s) Oral every 6 hours PRN  insulin glargine Injectable (LANTUS) 6 Unit(s) SubCutaneous at bedtime  insulin lispro (ADMELOG) corrective regimen sliding scale   SubCutaneous three times a day before meals  insulin lispro (ADMELOG) corrective regimen sliding scale   SubCutaneous at bedtime  levothyroxine 25 MICROGram(s) Oral daily  memantine 5 milliGRAM(s) Oral at bedtime  metoprolol tartrate 25 milliGRAM(s) Oral two times a day  midodrine 10 milliGRAM(s) Oral <User Schedule>  Nephro-celeste 1 Tablet(s) Oral daily  polyethylene glycol 3350 17 Gram(s) Oral two times a day  QUEtiapine 12.5 milliGRAM(s) Oral daily  QUEtiapine 25 milliGRAM(s) Oral every 6 hours PRN  senna Syrup 10 milliLiter(s) Oral at bedtime  sodium chloride 0.9% lock flush 10 milliLiter(s) IV Push every 1 hour PRN  trihexyphenidyl 2 milliGRAM(s) Oral three times a day      PHYSICAL EXAM:  GENERAL: NAD,   EYES: conjunctiva and sclera clear  ENMT: Moist mucous membranes  NECK: Supple, No JVD, Normal thyroid  CHEST/LUNG: non labored, cta b/l  HEART: Regular rate and rhythm; No murmurs, rubs, or gallops  ABDOMEN: Soft, Nontender, Nondistended; Bowel sounds present  EXTREMITIES:  2+ Peripheral Pulses, No clubbing, cyanosis, or edema  LYMPH: No lymphadenopathy noted  SKIN: No rashes or lesions    Consultant(s) Notes Reviewed:  [x ] YES  [ ] NO  Care Discussed with Consultants/Other Providers [ x] YES  [ ] NO    LABS:                        8.6    10.65 )-----------( 359      ( 20 Nov 2021 05:42 )             27.8     11-19    134<L>  |  95<L>  |  28<H>  ----------------------------<  120<H>  3.5   |  23  |  3.97<H>    Ca    9.3      19 Nov 2021 05:47  Phos  4.4     11-20  Mg     2.2     11-20          CAPILLARY BLOOD GLUCOSE      POCT Blood Glucose.: 155 mg/dL (20 Nov 2021 12:01)  POCT Blood Glucose.: 136 mg/dL (20 Nov 2021 07:47)  POCT Blood Glucose.: 148 mg/dL (19 Nov 2021 22:25)  POCT Blood Glucose.: 161 mg/dL (19 Nov 2021 16:50)              RADIOLOGY & ADDITIONAL TESTS:    Imaging Personally Reviewed:  [x ] YES  [ ] NO

## 2021-11-20 NOTE — PROGRESS NOTE ADULT - ASSESSMENT
Assessment  DMT2: 71y Male with DM T2 with hyperglycemia, A1C 6.4%, was on insulin at home, now on low-dose basal insulin and coverage, blood sugars within acceptable range, no  hypoglycemic episodes. Patient is eating partial meals, RD eval reviewed  for malnutrition, very confused, NAD.  Hypothyroidism: Patient has no history thyroid disease, was not on any thyroid supplements, subclinical with low-normal FT4, lethargic, started on synthroid 25 mcg po daily, FT4 improved to 1.2.  CHF: on medications, stable, monitored.  HTN: Controlled,  on antihypertensive medications.  Parkinsons: on meds, monitored.  CKD: Monitor labs/BMP      Kelsie Reece MD  Cell: [phone]  Office: [phone]

## 2021-11-20 NOTE — PROGRESS NOTE ADULT - ASSESSMENT
72yo M w/ PMHx of CAD (s/p CABG 2019), CKD (unknown stage), DM2, Parkinson's Disease, HTN, depression presents with new onset acute heart failure exacerbation, NSTEMI, started on hep gtt, developed bl RP bleed, acute anemia. sp MICU course for hemorrhagic shock, 10/28 sp IR embolization l4 and l5 lumbar artery. for permacath and AVF.    # Acute systolic and diastolic heart failure, improved  # NSTEMI- conservative mgmt dt renal function and anemia-bld loss  # ESRD, new HD  # Atrial flutter  # acute hypoxic resp failure, improved  # hemorrhagic shock, left RP hematoma, acute blood loss anemia  # lupus anticoagulant +  Echo TTE mod to severe LV SD, hypokinesis anterior wall, not candidate for cath at this point  HD per renal  10/28 sp IR embolization l4 and l5 lumbar artery  on midodrine during dialysis  WBC stable, improved overall  sp permacath placement  sp L AVF  new left UE swelling, hematoma noted on doppler US   concern for bleeding with drop in hgb, transfuse 1 unit prbc and 2 unit FFP  LUE doppler noted  vascular following- ace wrapping, pulses palpable    # DM2 (diabetes mellitus, type 2)  per endo  hba1c 6.4    # CAD (coronary artery disease)  # HTN  cont lopressor  c/w atorvastatin  asa on hold    # Parkinsons disease   # delirium  at baseline pt is poor historian but remains confused  remains on restraints as pulling at permacath  psych recs seroquel and olanzpine prn if agitated  carbidopa/levodopa   c/w trihexyphenidyl  psych fu to optimize meds and assist with dc to rehab  palliative cs appreciated- family wish pt to be made DNR/I    PCP Dr. Simons    [facility]  [phone]

## 2021-11-20 NOTE — PROGRESS NOTE ADULT - SUBJECTIVE AND OBJECTIVE BOX
Patient seen and examined in bed.  No new events.    REVIEW OF SYSTEMS:  As per HPI, otherwise 8 full 10 ROS were unremarkable    MEDICATIONS  (STANDING):  atorvastatin 80 milliGRAM(s) Oral at bedtime  carbidopa/levodopa  25/100 2 Tablet(s) Oral <User Schedule>  carbidopa/levodopa  25/100 2.5 Tablet(s) Oral <User Schedule>  chlorhexidine 4% Liquid 1 Application(s) Topical <User Schedule>  cholecalciferol 1000 Unit(s) Oral daily  dextrose 5%. 1000 milliLiter(s) (50 mL/Hr) IV Continuous <Continuous>  dextrose 5%. 1000 milliLiter(s) (100 mL/Hr) IV Continuous <Continuous>  dextrose 50% Injectable 25 Gram(s) IV Push once  dextrose 50% Injectable 25 Gram(s) IV Push once  dextrose 50% Injectable 12.5 Gram(s) IV Push once  folic acid 1 milliGRAM(s) Oral daily  glucagon  Injectable 1 milliGRAM(s) IntraMuscular once  insulin glargine Injectable (LANTUS) 6 Unit(s) SubCutaneous at bedtime  insulin lispro (ADMELOG) corrective regimen sliding scale   SubCutaneous three times a day before meals  insulin lispro (ADMELOG) corrective regimen sliding scale   SubCutaneous at bedtime  levothyroxine 25 MICROGram(s) Oral daily  memantine 5 milliGRAM(s) Oral at bedtime  metoprolol tartrate 25 milliGRAM(s) Oral two times a day  midodrine 20 milliGRAM(s) Oral <User Schedule>  Nephro-celeste 1 Tablet(s) Oral daily  polyethylene glycol 3350 17 Gram(s) Oral two times a day  QUEtiapine 12.5 milliGRAM(s) Oral daily  senna Syrup 10 milliLiter(s) Oral at bedtime  trihexyphenidyl 2 milliGRAM(s) Oral three times a day      VITAL:  T(C): , Max: 36.8 (11-19-21 @ 20:52)  T(F): , Max: 98.3 (11-19-21 @ 20:52)  HR: 106 (11-20-21 @ 12:17)  BP: 153/83 (11-20-21 @ 12:17)  BP(mean): --  RR: 18 (11-20-21 @ 12:17)  SpO2: 100% (11-20-21 @ 12:17)  Wt(kg): --    I and O's:    11-19 @ 07:01  -  11-20 @ 07:00  --------------------------------------------------------  IN: 860 mL / OUT: 0 mL / NET: 860 mL    11-20 @ 07:01  -  11-20 @ 13:13  --------------------------------------------------------  IN: 860 mL / OUT: 1900 mL / NET: -1040 mL          PHYSICAL EXAM:    Constitutional: NAD  HEENT: PERRLA, EOMI,  MMM  Neck: No LAD, No JVD  Respiratory: CTAB  Cardiovascular: S1 and S2  Gastrointestinal: BS+, soft, NT/ND  Extremities: +LUE edema  Neurological: confused  : No Javier  Skin: No rashes  Access: LUE AVF; perm cath    LABS:                        8.6    10.65 )-----------( 359      ( 20 Nov 2021 05:42 )             27.8     11-19    134<L>  |  95<L>  |  28<H>  ----------------------------<  120<H>  3.5   |  23  |  3.97<H>    Ca    9.3      19 Nov 2021 05:47  Phos  4.4     11-20  Mg     2.2     11-20      ASSESSMENT/PLAN:  70yo M w/ PMHx of CAD (s/p CABG 2019), CKD (unknown stage), DM2, Parkinson's Disease, HTN, depression presents with bilateral leg swelling  ESRD   Severe LV dysfunction ;  A flutter   Confusion - alert at present     1 IR-  s/p left upper avf access with intact staples    2 Renal-  s/p HD today; 1.1 kg removed    3 CVS- BP controlled at present, on Midodrine (with hold parameters in place, SBP> 160),  Lopressor for heart rate control   Decrease Midodrine to 10 mg    4 Anemia - hgb slightly trending down, s/p Retacrit 10k units at HD     5 Vasc - s/p  LUE AVF  with intact staples    DC planning to subacute with HD

## 2021-11-21 NOTE — PROGRESS NOTE ADULT - ASSESSMENT
Assessment  DMT2: 71y Male with DM T2 with hyperglycemia, A1C 6.4%, was on insulin at home, now on low-dose basal insulin and coverage, blood sugars within acceptable range, no  hypoglycemic episodes. Patient is eating partial meals, RD eval reviewed  for malnutrition, no overnight events, comfortable.  Hypothyroidism: Patient has no history thyroid disease, was not on any thyroid supplements, subclinical with low-normal FT4, lethargic, started on synthroid 25 mcg po daily, FT4 improved to 1.2.  CHF: on medications, stable, monitored.  HTN: Controlled,  on antihypertensive medications.  Parkinsons: on meds, monitored.  CKD: Monitor labs/BMP      Kelsie Reece MD  Cell: [phone]  Office: [phone]

## 2021-11-21 NOTE — PROGRESS NOTE ADULT - SUBJECTIVE AND OBJECTIVE BOX
Patient is a 71y old  Male who presents with a chief complaint of leg swelling (21 Nov 2021 15:52)      INTERVAL HPI/OVERNIGHT EVENTS: noted  pt seen and examined this am   events noted      Vital Signs Last 24 Hrs  T(C): 36.7 (21 Nov 2021 11:18), Max: 37 (20 Nov 2021 20:21)  T(F): 98 (21 Nov 2021 11:18), Max: 98.6 (20 Nov 2021 20:21)  HR: 91 (21 Nov 2021 18:54) (91 - 113)  BP: 150/80 (21 Nov 2021 18:54) (128/78 - 150/80)  BP(mean): --  RR: 18 (21 Nov 2021 11:18) (18 - 18)  SpO2: 99% (21 Nov 2021 11:18) (96% - 100%)    acetaminophen     Tablet .. 650 milliGRAM(s) Oral every 6 hours PRN  albuterol/ipratropium for Nebulization 3 milliLiter(s) Nebulizer every 6 hours PRN  atorvastatin 80 milliGRAM(s) Oral at bedtime  carbidopa/levodopa  25/100 2.5 Tablet(s) Oral <User Schedule>  carbidopa/levodopa  25/100 2 Tablet(s) Oral <User Schedule>  chlorhexidine 4% Liquid 1 Application(s) Topical <User Schedule>  cholecalciferol 1000 Unit(s) Oral daily  dextrose 5%. 1000 milliLiter(s) IV Continuous <Continuous>  dextrose 5%. 1000 milliLiter(s) IV Continuous <Continuous>  dextrose 50% Injectable 25 Gram(s) IV Push once  dextrose 50% Injectable 12.5 Gram(s) IV Push once  dextrose 50% Injectable 25 Gram(s) IV Push once  folic acid 1 milliGRAM(s) Oral daily  glucagon  Injectable 1 milliGRAM(s) IntraMuscular once  guaiFENesin Oral Liquid (Sugar-Free) 200 milliGRAM(s) Oral every 6 hours PRN  insulin glargine Injectable (LANTUS) 6 Unit(s) SubCutaneous at bedtime  insulin lispro (ADMELOG) corrective regimen sliding scale   SubCutaneous three times a day before meals  insulin lispro (ADMELOG) corrective regimen sliding scale   SubCutaneous at bedtime  levothyroxine 25 MICROGram(s) Oral daily  memantine 5 milliGRAM(s) Oral at bedtime  metoprolol tartrate 25 milliGRAM(s) Oral two times a day  midodrine 10 milliGRAM(s) Oral <User Schedule>  Nephro-celeste 1 Tablet(s) Oral daily  polyethylene glycol 3350 17 Gram(s) Oral two times a day  QUEtiapine 25 milliGRAM(s) Oral every 6 hours PRN  QUEtiapine 12.5 milliGRAM(s) Oral daily  senna Syrup 10 milliLiter(s) Oral at bedtime  sodium chloride 0.9% lock flush 10 milliLiter(s) IV Push every 1 hour PRN  trihexyphenidyl 2 milliGRAM(s) Oral three times a day      PHYSICAL EXAM:  GENERAL: NAD,   EYES: conjunctiva and sclera clear  ENMT: Moist mucous membranes  NECK: Supple, No JVD, Normal thyroid  CHEST/LUNG: non labored, cta b/l  HEART: Regular rate and rhythm; No murmurs, rubs, or gallops  ABDOMEN: Soft, Nontender, Nondistended; Bowel sounds present  EXTREMITIES:  2+ Peripheral Pulses, No clubbing, cyanosis, or edema  LYMPH: No lymphadenopathy noted  SKIN: No rashes or lesions    Consultant(s) Notes Reviewed:  [x ] YES  [ ] NO  Care Discussed with Consultants/Other Providers [ x] YES  [ ] NO    LABS:                        8.9    10.47 )-----------( 354      ( 21 Nov 2021 04:44 )             29.4       Phos  4.4     11-20  Mg     2.2     11-20          CAPILLARY BLOOD GLUCOSE      POCT Blood Glucose.: 167 mg/dL (21 Nov 2021 16:24)  POCT Blood Glucose.: 149 mg/dL (21 Nov 2021 11:40)  POCT Blood Glucose.: 146 mg/dL (21 Nov 2021 07:53)  POCT Blood Glucose.: 208 mg/dL (20 Nov 2021 21:37)              RADIOLOGY & ADDITIONAL TESTS:    Imaging Personally Reviewed:  [x ] YES  [ ] NO

## 2021-11-21 NOTE — PROGRESS NOTE ADULT - SUBJECTIVE AND OBJECTIVE BOX
Chief complaint    Patient is a 71y old  Male who presents with a chief complaint of leg swelling (20 Nov 2021 18:54)   Review of systems  Patient in bed, appears comfortable.    Labs and Fingersticks  CAPILLARY BLOOD GLUCOSE      POCT Blood Glucose.: 149 mg/dL (21 Nov 2021 11:40)  POCT Blood Glucose.: 146 mg/dL (21 Nov 2021 07:53)  POCT Blood Glucose.: 208 mg/dL (20 Nov 2021 21:37)                    Phos  4.4     11-20  Mg     2.2     11-20                          8.9    10.47 )-----------( 354      ( 21 Nov 2021 04:44 )             29.4     Medications  MEDICATIONS  (STANDING):  atorvastatin 80 milliGRAM(s) Oral at bedtime  carbidopa/levodopa  25/100 2.5 Tablet(s) Oral <User Schedule>  carbidopa/levodopa  25/100 2 Tablet(s) Oral <User Schedule>  chlorhexidine 4% Liquid 1 Application(s) Topical <User Schedule>  cholecalciferol 1000 Unit(s) Oral daily  dextrose 5%. 1000 milliLiter(s) (100 mL/Hr) IV Continuous <Continuous>  dextrose 5%. 1000 milliLiter(s) (50 mL/Hr) IV Continuous <Continuous>  dextrose 50% Injectable 25 Gram(s) IV Push once  dextrose 50% Injectable 12.5 Gram(s) IV Push once  dextrose 50% Injectable 25 Gram(s) IV Push once  folic acid 1 milliGRAM(s) Oral daily  glucagon  Injectable 1 milliGRAM(s) IntraMuscular once  insulin glargine Injectable (LANTUS) 6 Unit(s) SubCutaneous at bedtime  insulin lispro (ADMELOG) corrective regimen sliding scale   SubCutaneous three times a day before meals  insulin lispro (ADMELOG) corrective regimen sliding scale   SubCutaneous at bedtime  levothyroxine 25 MICROGram(s) Oral daily  memantine 5 milliGRAM(s) Oral at bedtime  metoprolol tartrate 25 milliGRAM(s) Oral two times a day  midodrine 10 milliGRAM(s) Oral <User Schedule>  Nephro-celeste 1 Tablet(s) Oral daily  polyethylene glycol 3350 17 Gram(s) Oral two times a day  QUEtiapine 12.5 milliGRAM(s) Oral daily  senna Syrup 10 milliLiter(s) Oral at bedtime  trihexyphenidyl 2 milliGRAM(s) Oral three times a day      Physical Exam  General: Patient comfortable in bed  Vital Signs Last 12 Hrs  T(F): 98 (11-21-21 @ 11:18), Max: 98 (11-21-21 @ 11:18)  HR: 94 (11-21-21 @ 11:18) (94 - 94)  BP: 128/78 (11-21-21 @ 11:18) (128/78 - 128/78)  BP(mean): --  RR: 18 (11-21-21 @ 11:18) (18 - 18)  SpO2: 99% (11-21-21 @ 11:18) (99% - 99%)  Neck: No palpable thyroid nodules.  CVS: S1S2, No murmurs  Respiratory: No wheezing, no crepitations  GI: Abdomen soft, bowel sounds positive  Musculoskeletal:  edema lower extremities.     Diagnostics

## 2021-11-21 NOTE — PROGRESS NOTE ADULT - ASSESSMENT
70yo M w/ PMHx of CAD (s/p CABG 2019), CKD (unknown stage), DM2, Parkinson's Disease, HTN, depression presents with new onset acute heart failure exacerbation, NSTEMI, started on hep gtt, developed bl RP bleed, acute anemia. sp MICU course for hemorrhagic shock, 10/28 sp IR embolization l4 and l5 lumbar artery. for permacath and AVF.    # Acute systolic and diastolic heart failure, improved  # NSTEMI- conservative mgmt dt renal function and anemia-bld loss  # ESRD, new HD  # Atrial flutter  # acute hypoxic resp failure, improved  # hemorrhagic shock, left RP hematoma, acute blood loss anemia  # lupus anticoagulant +  Echo TTE mod to severe LV SD, hypokinesis anterior wall, not candidate for cath at this point  HD per renal  10/28 sp IR embolization l4 and l5 lumbar artery  on midodrine during dialysis  WBC stable, improved overall  sp permacath placement  sp L AVF  new left UE swelling, hematoma noted on doppler US   concern for bleeding with drop in hgb, transfuse 1 unit prbc and 2 unit FFP  LUE doppler noted  vascular following- ace wrapping, pulses palpable    # DM2 (diabetes mellitus, type 2)  per endo  hba1c 6.4    # CAD (coronary artery disease)  # HTN  cont lopressor  c/w atorvastatin  asa on hold    # Parkinsons disease   # delirium  at baseline pt is poor historian but remains confused  remains on restraints as pulling at permacath  psych recs seroquel and olanzpine prn if agitated  carbidopa/levodopa   c/w trihexyphenidyl  psych fu to optimize meds and assist with dc to rehab  palliative cs appreciated- family wish pt to be made DNR/I    PCP Dr. Simons    [facility]  [phone]

## 2021-11-22 NOTE — PROGRESS NOTE ADULT - SUBJECTIVE AND OBJECTIVE BOX
[facility] [phone]        Patient seen and examined in bed.    He was about the same         MEDICATIONS  (STANDING):  atorvastatin 80 milliGRAM(s) Oral at bedtime  carbidopa/levodopa  25/100 2.5 Tablet(s) Oral <User Schedule>  carbidopa/levodopa  25/100 2 Tablet(s) Oral <User Schedule>  chlorhexidine 4% Liquid 1 Application(s) Topical <User Schedule>  cholecalciferol 1000 Unit(s) Oral daily  dextrose 5%. 1000 milliLiter(s) (50 mL/Hr) IV Continuous <Continuous>  dextrose 5%. 1000 milliLiter(s) (100 mL/Hr) IV Continuous <Continuous>  dextrose 50% Injectable 25 Gram(s) IV Push once  dextrose 50% Injectable 25 Gram(s) IV Push once  dextrose 50% Injectable 12.5 Gram(s) IV Push once  folic acid 1 milliGRAM(s) Oral daily  glucagon  Injectable 1 milliGRAM(s) IntraMuscular once  insulin glargine Injectable (LANTUS) 6 Unit(s) SubCutaneous at bedtime  insulin lispro (ADMELOG) corrective regimen sliding scale   SubCutaneous three times a day before meals  insulin lispro (ADMELOG) corrective regimen sliding scale   SubCutaneous at bedtime  levothyroxine 25 MICROGram(s) Oral daily  memantine 5 milliGRAM(s) Oral at bedtime  metoprolol tartrate 25 milliGRAM(s) Oral two times a day  midodrine 10 milliGRAM(s) Oral <User Schedule>  Nephro-celeste 1 Tablet(s) Oral daily  polyethylene glycol 3350 17 Gram(s) Oral two times a day  QUEtiapine 12.5 milliGRAM(s) Oral daily  senna Syrup 10 milliLiter(s) Oral at bedtime  trihexyphenidyl 2 milliGRAM(s) Oral three times a day      VITAL:  T(C): , Max: 36.7 (11-21-21 @ 11:18)  T(F): , Max: 98 (11-21-21 @ 11:18)  HR: 89 (11-22-21 @ 04:04)  BP: 133/74 (11-22-21 @ 04:04)  BP(mean): --  RR: 18 (11-22-21 @ 04:04)  SpO2: 97% (11-22-21 @ 04:04)  Wt(kg): --    I and O's:    11-21 @ 07:01  -  11-22 @ 07:00  --------------------------------------------------------  IN: 610 mL / OUT: 0 mL / NET: 610 mL          PHYSICAL EXAM:    Constitutional: NAD  Neck:  No JVD  Respiratory: CTAB/L  Cardiovascular: S1 and S2  Gastrointestinal: BS+, soft, NT/ND  Extremities: No peripheral edema  Neurological:   no focal deficits  Psychiatric:    : No Javier  Skin: No rashes  Access: perm cath and av access     LABS:                        8.9    10.47 )-----------( 354      ( 21 Nov 2021 04:44 )             29.4                 Urine Studies:          RADIOLOGY & ADDITIONAL STUDIES:             [facility] [phone]        Patient seen and examined in bed.    He was about the same         MEDICATIONS  (STANDING):  atorvastatin 80 milliGRAM(s) Oral at bedtime  carbidopa/levodopa  25/100 2.5 Tablet(s) Oral <User Schedule>  carbidopa/levodopa  25/100 2 Tablet(s) Oral <User Schedule>  chlorhexidine 4% Liquid 1 Application(s) Topical <User Schedule>  cholecalciferol 1000 Unit(s) Oral daily  dextrose 5%. 1000 milliLiter(s) (50 mL/Hr) IV Continuous <Continuous>  dextrose 5%. 1000 milliLiter(s) (100 mL/Hr) IV Continuous <Continuous>  dextrose 50% Injectable 25 Gram(s) IV Push once  dextrose 50% Injectable 25 Gram(s) IV Push once  dextrose 50% Injectable 12.5 Gram(s) IV Push once  folic acid 1 milliGRAM(s) Oral daily  glucagon  Injectable 1 milliGRAM(s) IntraMuscular once  insulin glargine Injectable (LANTUS) 6 Unit(s) SubCutaneous at bedtime  insulin lispro (ADMELOG) corrective regimen sliding scale   SubCutaneous three times a day before meals  insulin lispro (ADMELOG) corrective regimen sliding scale   SubCutaneous at bedtime  levothyroxine 25 MICROGram(s) Oral daily  memantine 5 milliGRAM(s) Oral at bedtime  metoprolol tartrate 25 milliGRAM(s) Oral two times a day  midodrine 10 milliGRAM(s) Oral <User Schedule>  Nephro-celeste 1 Tablet(s) Oral daily  polyethylene glycol 3350 17 Gram(s) Oral two times a day  QUEtiapine 12.5 milliGRAM(s) Oral daily  senna Syrup 10 milliLiter(s) Oral at bedtime  trihexyphenidyl 2 milliGRAM(s) Oral three times a day      VITAL:  T(C): , Max: 36.7 (11-21-21 @ 11:18)  T(F): , Max: 98 (11-21-21 @ 11:18)  HR: 89 (11-22-21 @ 04:04)  BP: 133/74 (11-22-21 @ 04:04)  BP(mean): --  RR: 18 (11-22-21 @ 04:04)  SpO2: 97% (11-22-21 @ 04:04)  Wt(kg): --    I and O's:    11-21 @ 07:01  -  11-22 @ 07:00  --------------------------------------------------------  IN: 610 mL / OUT: 0 mL / NET: 610 mL          PHYSICAL EXAM:    Constitutional: NAD  Neck:  No JVD  Respiratory: CTAB/L  Cardiovascular: S1 and S2  Gastrointestinal: BS+, soft, NT/ND  Extremities: No peripheral edema  Neurological:   no focal deficits  Psychiatric:    : No Javier  Skin: No rashes  Access: perm cath and av graft     LABS:                        8.9    10.47 )-----------( 354      ( 21 Nov 2021 04:44 )             29.4                 Urine Studies:          RADIOLOGY & ADDITIONAL STUDIES:          < from: [facility] Duplex Upper Ext Vein Scan, Left (11.17.21 @ 10:46) >    EXAM:  DUPLEX EXT VEINS UPPER LT                            PROCEDURE DATE:  11/17/2021            INTERPRETATION:  CLINICAL INFORMATION: Recently placed left upper extremity dialysis access fistula, left upper extremity swelling    COMPARISON: None available.    TECHNIQUE: Duplex sonography of the LEFT UPPER extremity veins with color and spectral Doppler, with and without compression.    FINDINGS:    There is a small hematoma located at the surgical site of the recently placed dialysis accessfistula    The left internal jugular, subclavian, axillary and brachial veins are patent and compressible where applicable.  The basilic and cephalic veins (superficial veins) are patent and without thrombus.    Doppler examination shows normal spontaneous and phasic flow.    IMPRESSION:  No evidence of left upper extremity deep venous thrombosis.        --- End of Report ---                SELINA MAGDALENO MD; Attending Radiologist  This document has been elect    < end of copied text >

## 2021-11-22 NOTE — PROGRESS NOTE ADULT - ASSESSMENT
72yo M w/ PMHx of CAD (s/p CABG 2019), CKD (unknown stage), DM2, Parkinson's Disease, HTN, depression presents with bilateral leg swelling  ESRD   Severe LV dysfunction ;  A flutter   Confusion - alert at present     1 IR-  s/p left upper avf access with intact staples    2 Renal-  HD today    3 CVS- BP controlled at present, on Midodrine (with hold parameters in place, SBP> 160),  Lopressor for heart rate control     4 Anemia - hgb slightly trending down,   Retacrit 10k units at HD     5 Vasc - s/p  LUE AVF  with intact staples    DC planning to subacute with HD     [facility]   [phone]  70yo M w/ PMHx of CAD (s/p CABG 2019), CKD (unknown stage), DM2, Parkinson's Disease, HTN, depression presents with bilateral leg swelling  ESRD   Severe LV dysfunction ;  A flutter   Confusion - alert at present     1 IR-  s/p left upper avf access      2 Renal-  HD today    3 CVS- BP controlled at present, on Midodrine (with hold parameters in place, SBP> 160),  Lopressor for heart rate control     4 Anemia - hgb slightly trending down,   Retacrit 10k units at HD     5 Vasc - s/p  LUE AVF  with intact staples(vasc to fu re dc the staples)    DC planning to subacute with HD     [facility]   [facility]   [phone]

## 2021-11-22 NOTE — PROGRESS NOTE ADULT - SUBJECTIVE AND OBJECTIVE BOX
[facility]      Seen earlier today, and examined.  - Today, patient is without complaints.           *****MEDICATIONS: Current medication reviewed and documented.    MEDICATIONS  (STANDING):  atorvastatin 80 milliGRAM(s) Oral at bedtime  carbidopa/levodopa  25/100 2 Tablet(s) Oral <User Schedule>  carbidopa/levodopa  25/100 2.5 Tablet(s) Oral <User Schedule>  chlorhexidine 4% Liquid 1 Application(s) Topical <User Schedule>  cholecalciferol 1000 Unit(s) Oral daily  dextrose 5%. 1000 milliLiter(s) (50 mL/Hr) IV Continuous <Continuous>  dextrose 5%. 1000 milliLiter(s) (100 mL/Hr) IV Continuous <Continuous>  dextrose 50% Injectable 25 Gram(s) IV Push once  dextrose 50% Injectable 25 Gram(s) IV Push once  dextrose 50% Injectable 12.5 Gram(s) IV Push once  folic acid 1 milliGRAM(s) Oral daily  glucagon  Injectable 1 milliGRAM(s) IntraMuscular once  insulin glargine Injectable (LANTUS) 6 Unit(s) SubCutaneous at bedtime  insulin lispro (ADMELOG) corrective regimen sliding scale   SubCutaneous three times a day before meals  insulin lispro (ADMELOG) corrective regimen sliding scale   SubCutaneous at bedtime  levothyroxine 25 MICROGram(s) Oral daily  memantine 5 milliGRAM(s) Oral at bedtime  metoprolol tartrate 25 milliGRAM(s) Oral two times a day  midodrine 10 milliGRAM(s) Oral <User Schedule>  Nephro-celeste 1 Tablet(s) Oral daily  polyethylene glycol 3350 17 Gram(s) Oral two times a day  QUEtiapine 12.5 milliGRAM(s) Oral daily  senna Syrup 10 milliLiter(s) Oral at bedtime  trihexyphenidyl 2 milliGRAM(s) Oral three times a day    MEDICATIONS  (PRN):  acetaminophen     Tablet .. 650 milliGRAM(s) Oral every 6 hours PRN Mild Pain (1 - 3)  albuterol/ipratropium for Nebulization 3 milliLiter(s) Nebulizer every 6 hours PRN Shortness of Breath and/or Wheezing  guaiFENesin Oral Liquid (Sugar-Free) 200 milliGRAM(s) Oral every 6 hours PRN Cough  QUEtiapine 25 milliGRAM(s) Oral every 6 hours PRN Agitation  sodium chloride 0.9% lock flush 10 milliLiter(s) IV Push every 1 hour PRN Pre/post blood products, medications, blood draw, and to maintain line patency          ***** VITAL SIGNS:  T(F): 97 (21 @ 10:00), Max: 98 (21 @ 09:05)  HR: 82 (21 @ 10:00) (82 - 103)  BP: 116/62 (21 @ 10:00) (116/62 - 150/80)  RR: 17 (21 @ 10:00) (17 - 18)  SpO2: 99% (21 @ 10:00) (97% - 99%)  Wt(kg): --  ,   I&O's Summary    2021 07:  -  2021 07:00  --------------------------------------------------------  IN: 610 mL / OUT: 0 mL / NET: 610 mL    2021 07:  -  2021 13:14  --------------------------------------------------------  IN: 310 mL / OUT: 1600 mL / NET: -1290 mL             *****PHYSICAL EXAM:   alert  answering questions appropriately   able to follow some simple commands intermittently   not fully cooperative to exam   extremely limited   EOmi fundi not visualized    Tongue is midline  Palate elevates symmetrically   Moving all 4 ext spontaneously no drift appreciated    Gait not assessed.            *****LAB AND IMAGIN.9    10.47 )-----------( 354      ( 2021 04:44 )             29.4                   135  |  94<L>  |  53<H>  ----------------------------<  198<H>  3.5   |  25  |  5.98<H>    Ca    9.4      2021 10:39  Phos  4.6     11-                           [All pertinent recent Imaging/Reports reviewed]           *****A S S E S S M E N T   A N D   P L A N :    Excerpt from H&P,"  70yo M w/ PMHx of CAD (s/p CABG ), CKD (unknown stage), DM2, Parkinson's Disease, HTN, depression presents with bilateral leg swelling, patient's daughter in-law at bedside Elsy Quintero ([address] [place] [address] Nurse) provides the history, the daughter reports the patient is a poor historian, unable to remember having conversations with others and likely cannot weigh risk/benefits of medical conditions, she reports that he has had bilateral lower extremity swelling for the past few months, but over the past 2 weeks it has significantly worsened, he has further difficulty ambulating due to swelling, his outpatient provider attempted to manage with 20mg lasix and then increased to 40mg lasix but the patient never took the increased dose, his symptoms ar persistent throughout the day and are extremely severe, he has developed wounds of the legs with weeping of fluid due to the swelling, she reports that the patient is frequently non-compliant with medications and medical management, he lives at home with his son and daughter in law, he denies chest pain, shortness of breath, fever/chills, cough, sputum, in the ED, he was tachycardic but hemodynamically stable, afebrile, saturating well on room air, labs were significant for elevated BNP, elevated creatinine, imaging showed moderate left pleural effusion, patient was admitted to general medicine for further management          Problem/Recommendations 1:ams of unclear etiology   likely related to multiple metabolic derangements related to uremia  sleep wake cycle disruption and poor nutritional intake  would regulate sleep wake cycle  check b12/b1 folate/tsh /ammonia wnl   thiamine 500 iv q 8 x 2 days after checking vitamin b1 level   nutrition consult for severe protein caloric malnutrition   discussed with elsy( daughter in law), agreeing to start namenda 5 mg qhs probable early lewy body dementia related paranoia and agitation   plan likely needs to adjust dose of antipsychotics, however avoid typical antipsychotics  elsy denies any alcohol intake at all.     lowered seroquel 12.5 due to sedation    over night pt did not sleep   now fell asleep as per aide at 7.15   regulate sleep wake cycle   dc am dose of seroquel   increase night time dose to regulate sleep wake cycle         Problem/Recommendations 2: weakness   r/o orthostatic hypotension   pt shaking on standing up    prob deconditioning   pt eval   oob to chair as tolerated        Problem/Recommendations 3: parkinsons   continue current regimen   sinemet 2. 5 twice a day and 2 mg qh      Thank you for allowing me to participate in the care of this patient. Will continue to follow patient periodically. Please do not hesitate to call me if you have any  questions or if there has been a change in patients neurological status     ________________  Lily Fang MD  [facility] ([facility]  [phone]      33 minutes spent on total encounter; more than 50 % of the visit was  spent counseling about plan of care, compliance to diet/exercise and medication regimen and or  coordinating care by the attending physician.      It is advised that stroke patients follow up with ZACH Albarran @ [phone] in 1- 2 weeks.   Others please follow up with Dr. Michael Nissenbaum [phone]

## 2021-11-22 NOTE — BH CONSULTATION LIAISON PROGRESS NOTE - NSBHCONSULTMEDAGITATION_PSY_A_CORE FT
Olanzapine 1.25mg po/IM  Seroquel 12.5mg po BID standing and as a prn q6hrs as needed
Olanzapine 1.25mg po/IM  Seroquel 12.5mg po BID standing and as a prn q6hrs as needed

## 2021-11-22 NOTE — PROGRESS NOTE ADULT - ASSESSMENT
72yo M w/ PMHx of CAD (s/p CABG 2019), CKD (unknown stage), DM2, Parkinson's Disease, HTN, depression presents with new onset acute heart failure exacerbation, NSTEMI, started on hep gtt, developed bl RP bleed, acute anemia. sp MICU course for hemorrhagic shock, 10/28 sp IR embolization l4 and l5 lumbar artery. for permacath and AVF.    # Acute systolic and diastolic heart failure, improved  # NSTEMI- conservative mgmt dt renal function and anemia-bld loss  # ESRD, new HD  # Atrial flutter  # acute hypoxic resp failure, improved  # hemorrhagic shock, left RP hematoma, acute blood loss anemia  # lupus anticoagulant +  Echo TTE mod to severe LV SD, hypokinesis anterior wall, not candidate for cath at this point  HD per renal  10/28 sp IR embolization l4 and l5 lumbar artery  on midodrine during dialysis  WBC stable, improved overall  sp permacath placement  sp L AVF  new left UE swelling, hematoma noted on doppler US   concern for bleeding with drop in hgb, transfuse 1 unit prbc and 2 unit FFP  LUE doppler noted  vascular following- ace wrapping, pulses palpable    # DM2 (diabetes mellitus, type 2)  per endo  hba1c 6.4    # CAD (coronary artery disease)  # HTN  cont lopressor  c/w atorvastatin  asa on hold    # Parkinsons disease   # delirium  at baseline pt is poor historian but remains confused  remains on restraints as pulling at permacath  psych recs seroquel and olanzpine prn if agitated  carbidopa/levodopa   c/w trihexyphenidyl  psych fu to optimize meds and assist with dc to rehab  palliative cs appreciated- family wish pt to be made DNR/I    PCP Dr. Simons    [facility]  [phone]

## 2021-11-22 NOTE — PROGRESS NOTE ADULT - SUBJECTIVE AND OBJECTIVE BOX
Chief complaint  Patient is a 71y old  Male who presents with a chief complaint of leg swelling (22 Nov 2021 14:50)   Review of systems  Patient in bed, looks comfortable, no hypoglycemic episodes.    Labs and Fingersticks  CAPILLARY BLOOD GLUCOSE      POCT Blood Glucose.: 154 mg/dL (22 Nov 2021 13:55)  POCT Blood Glucose.: 169 mg/dL (22 Nov 2021 08:07)  POCT Blood Glucose.: 174 mg/dL (21 Nov 2021 22:07)  POCT Blood Glucose.: 167 mg/dL (21 Nov 2021 16:24)    Medications  MEDICATIONS  (STANDING):  atorvastatin 80 milliGRAM(s) Oral at bedtime  carbidopa/levodopa  25/100 2 Tablet(s) Oral <User Schedule>  carbidopa/levodopa  25/100 2.5 Tablet(s) Oral <User Schedule>  chlorhexidine 4% Liquid 1 Application(s) Topical <User Schedule>  cholecalciferol 1000 Unit(s) Oral daily  dextrose 5%. 1000 milliLiter(s) (50 mL/Hr) IV Continuous <Continuous>  dextrose 5%. 1000 milliLiter(s) (100 mL/Hr) IV Continuous <Continuous>  dextrose 50% Injectable 25 Gram(s) IV Push once  dextrose 50% Injectable 12.5 Gram(s) IV Push once  dextrose 50% Injectable 25 Gram(s) IV Push once  folic acid 1 milliGRAM(s) Oral daily  glucagon  Injectable 1 milliGRAM(s) IntraMuscular once  insulin glargine Injectable (LANTUS) 6 Unit(s) SubCutaneous at bedtime  insulin lispro (ADMELOG) corrective regimen sliding scale   SubCutaneous three times a day before meals  insulin lispro (ADMELOG) corrective regimen sliding scale   SubCutaneous at bedtime  levothyroxine 25 MICROGram(s) Oral daily  memantine 5 milliGRAM(s) Oral at bedtime  metoprolol tartrate 25 milliGRAM(s) Oral two times a day  midodrine 10 milliGRAM(s) Oral <User Schedule>  Nephro-celeste 1 Tablet(s) Oral daily  polyethylene glycol 3350 17 Gram(s) Oral two times a day  QUEtiapine 12.5 milliGRAM(s) Oral daily  senna Syrup 10 milliLiter(s) Oral at bedtime  trihexyphenidyl 2 milliGRAM(s) Oral three times a day      Physical Exam  General: Patient comfortable in bed  Vital Signs Last 12 Hrs  T(F): 98.2 (11-22-21 @ 14:05), Max: 98.2 (11-22-21 @ 14:05)  HR: 100 (11-22-21 @ 14:05) (82 - 100)  BP: 143/79 (11-22-21 @ 14:05) (116/62 - 143/79)  BP(mean): --  RR: 18 (11-22-21 @ 14:05) (17 - 18)  SpO2: 97% (11-22-21 @ 14:05) (97% - 99%)    Diagnostics    Free Thyroxine, Serum: AM Sched. Collection: 06-Nov-2021 06:00 (11-05 @ 13:18)  Free Thyroxine, Serum: AM Sched. Collection: 26-Oct-2021 06:00 (10-25 @ 11:45)  Thyroid Stimulating Hormone, Serum: AM Sched. Collection: 26-Oct-2021 06:00 (10-25 @ 11:45)           Chief complaint  Patient is a 71y old  Male who presents with a chief complaint of leg swelling (22 Nov 2021 14:50)   Review of systems  Patient in bed, looks comfortable, no hypoglycemic episodes.    Labs and Fingersticks  CAPILLARY BLOOD GLUCOSE      POCT Blood Glucose.: 154 mg/dL (22 Nov 2021 13:55)  POCT Blood Glucose.: 169 mg/dL (22 Nov 2021 08:07)  POCT Blood Glucose.: 174 mg/dL (21 Nov 2021 22:07)  POCT Blood Glucose.: 167 mg/dL (21 Nov 2021 16:24)    Medications  MEDICATIONS  (STANDING):  atorvastatin 80 milliGRAM(s) Oral at bedtime  carbidopa/levodopa  25/100 2 Tablet(s) Oral <User Schedule>  carbidopa/levodopa  25/100 2.5 Tablet(s) Oral <User Schedule>  chlorhexidine 4% Liquid 1 Application(s) Topical <User Schedule>  cholecalciferol 1000 Unit(s) Oral daily  dextrose 5%. 1000 milliLiter(s) (50 mL/Hr) IV Continuous <Continuous>  dextrose 5%. 1000 milliLiter(s) (100 mL/Hr) IV Continuous <Continuous>  dextrose 50% Injectable 25 Gram(s) IV Push once  dextrose 50% Injectable 12.5 Gram(s) IV Push once  dextrose 50% Injectable 25 Gram(s) IV Push once  folic acid 1 milliGRAM(s) Oral daily  glucagon  Injectable 1 milliGRAM(s) IntraMuscular once  insulin glargine Injectable (LANTUS) 6 Unit(s) SubCutaneous at bedtime  insulin lispro (ADMELOG) corrective regimen sliding scale   SubCutaneous three times a day before meals  insulin lispro (ADMELOG) corrective regimen sliding scale   SubCutaneous at bedtime  levothyroxine 25 MICROGram(s) Oral daily  memantine 5 milliGRAM(s) Oral at bedtime  metoprolol tartrate 25 milliGRAM(s) Oral two times a day  midodrine 10 milliGRAM(s) Oral <User Schedule>  Nephro-celeste 1 Tablet(s) Oral daily  polyethylene glycol 3350 17 Gram(s) Oral two times a day  QUEtiapine 12.5 milliGRAM(s) Oral daily  senna Syrup 10 milliLiter(s) Oral at bedtime  trihexyphenidyl 2 milliGRAM(s) Oral three times a day      Physical Exam  General: Patient comfortable in bed  Vital Signs Last 12 Hrs  T(F): 98.2 (11-22-21 @ 14:05), Max: 98.2 (11-22-21 @ 14:05)  HR: 100 (11-22-21 @ 14:05) (82 - 100)  BP: 143/79 (11-22-21 @ 14:05) (116/62 - 143/79)  BP(mean): --  RR: 18 (11-22-21 @ 14:05) (17 - 18)  SpO2: 97% (11-22-21 @ 14:05) (97% - 99%)    Diagnostics    Free Thyroxine, Serum: AM Sched. Collection: 06-Nov-2021 06:00 (11-05 @ 13:18)  Free Thyroxine, Serum: AM Sched. Collection: 26-Oct-2021 06:00 (10-25 @ 11:45)  Thyroid Stimulating Hormone, Serum: AM Sched. Collection: 26-Oct-2021 06:00 (10-25 @ 11:45)

## 2021-11-22 NOTE — BH CONSULTATION LIAISON PROGRESS NOTE - NSBHMSESPABN_PSY_A_CORE
Soft volume/Decreased productivity/Increased latency
Soft volume/Decreased productivity/Increased latency

## 2021-11-22 NOTE — PROGRESS NOTE ADULT - ASSESSMENT
Assessment  DMT2: 71y Male with DM T2 with hyperglycemia, A1C 6.4%, was on insulin at home, now on low-dose basal insulin and coverage, blood sugars trending within overall acceptable range, no hypoglycemic episodes. Patient is eating partial meals, appears comfortable in NAD, ?UE hematoma requiring transfusion, HD today.  Hypothyroidism: Patient has no history thyroid disease, was not on any thyroid supplements, subclinical with low-normal FT4, lethargic, started on synthroid 25 mcg po daily, FT4 improved to 1.2.  CHF: on medications, stable, monitored.  HTN: Controlled,  on antihypertensive medications.  Parkinsons: on meds, monitored.  CKD: Monitor labs/BMP      Kelsie Reece MD  Cell: [phone]  Office: [phone]     Assessment  DMT2: 71y Male with DM T2 with hyperglycemia, A1C 6.4%, was on insulin at home, now on low-dose basal insulin and coverage, blood sugars trending within overall acceptable range, no hypoglycemic episodes. Patient is eating partial meals, appears  comfortable in NAD, ?UE hematoma requiring transfusion, HD today.  Hypothyroidism: Patient has no history thyroid disease, was not on any thyroid supplements, subclinical with low-normal FT4, lethargic, started on synthroid 25 mcg po daily, FT4 improved to 1.2.  CHF: on medications, stable, monitored.  HTN: Controlled,  on antihypertensive medications.  Parkinsons: on meds, monitored.  CKD: Monitor labs/BMP      Kelsie Reece MD  Cell: [phone]  Office: [phone]

## 2021-11-22 NOTE — PROGRESS NOTE ADULT - SUBJECTIVE AND OBJECTIVE BOX
Patient is a 71y old  Male who presents with a chief complaint of leg swelling (22 Nov 2021 13:13)      INTERVAL HPI/OVERNIGHT EVENTS: noted  pt seen and examined this am   events noted  feels well      Vital Signs Last 24 Hrs  T(C): 36.8 (22 Nov 2021 14:05), Max: 36.8 (22 Nov 2021 14:05)  T(F): 98.2 (22 Nov 2021 14:05), Max: 98.2 (22 Nov 2021 14:05)  HR: 100 (22 Nov 2021 14:05) (82 - 103)  BP: 143/79 (22 Nov 2021 14:05) (116/62 - 150/80)  BP(mean): --  RR: 18 (22 Nov 2021 14:05) (17 - 18)  SpO2: 97% (22 Nov 2021 14:05) (97% - 99%)    acetaminophen     Tablet .. 650 milliGRAM(s) Oral every 6 hours PRN  albuterol/ipratropium for Nebulization 3 milliLiter(s) Nebulizer every 6 hours PRN  atorvastatin 80 milliGRAM(s) Oral at bedtime  carbidopa/levodopa  25/100 2.5 Tablet(s) Oral <User Schedule>  carbidopa/levodopa  25/100 2 Tablet(s) Oral <User Schedule>  chlorhexidine 4% Liquid 1 Application(s) Topical <User Schedule>  cholecalciferol 1000 Unit(s) Oral daily  dextrose 5%. 1000 milliLiter(s) IV Continuous <Continuous>  dextrose 5%. 1000 milliLiter(s) IV Continuous <Continuous>  dextrose 50% Injectable 25 Gram(s) IV Push once  dextrose 50% Injectable 25 Gram(s) IV Push once  dextrose 50% Injectable 12.5 Gram(s) IV Push once  folic acid 1 milliGRAM(s) Oral daily  glucagon  Injectable 1 milliGRAM(s) IntraMuscular once  guaiFENesin Oral Liquid (Sugar-Free) 200 milliGRAM(s) Oral every 6 hours PRN  insulin glargine Injectable (LANTUS) 6 Unit(s) SubCutaneous at bedtime  insulin lispro (ADMELOG) corrective regimen sliding scale   SubCutaneous three times a day before meals  insulin lispro (ADMELOG) corrective regimen sliding scale   SubCutaneous at bedtime  levothyroxine 25 MICROGram(s) Oral daily  memantine 5 milliGRAM(s) Oral at bedtime  metoprolol tartrate 25 milliGRAM(s) Oral two times a day  midodrine 10 milliGRAM(s) Oral <User Schedule>  Nephro-celeste 1 Tablet(s) Oral daily  polyethylene glycol 3350 17 Gram(s) Oral two times a day  QUEtiapine 25 milliGRAM(s) Oral every 6 hours PRN  QUEtiapine 12.5 milliGRAM(s) Oral daily  senna Syrup 10 milliLiter(s) Oral at bedtime  sodium chloride 0.9% lock flush 10 milliLiter(s) IV Push every 1 hour PRN  trihexyphenidyl 2 milliGRAM(s) Oral three times a day      PHYSICAL EXAM:  GENERAL: NAD,   EYES: conjunctiva and sclera clear  ENMT: Moist mucous membranes  NECK: Supple, No JVD, Normal thyroid  CHEST/LUNG: non labored, cta b/l  HEART: Regular rate and rhythm; No murmurs, rubs, or gallops  ABDOMEN: Soft, Nontender, Nondistended; Bowel sounds present  EXTREMITIES:  2+ Peripheral Pulses, No clubbing, cyanosis, or edema  LYMPH: No lymphadenopathy noted  SKIN: No rashes or lesions    Consultant(s) Notes Reviewed:  [x ] YES  [ ] NO  Care Discussed with Consultants/Other Providers [ x] YES  [ ] NO    LABS:                        8.9    10.47 )-----------( 354      ( 21 Nov 2021 04:44 )             29.4     11-22    135  |  94<L>  |  53<H>  ----------------------------<  198<H>  3.5   |  25  |  5.98<H>    Ca    9.4      22 Nov 2021 10:39  Phos  4.6     11-22          CAPILLARY BLOOD GLUCOSE      POCT Blood Glucose.: 154 mg/dL (22 Nov 2021 13:55)  POCT Blood Glucose.: 169 mg/dL (22 Nov 2021 08:07)  POCT Blood Glucose.: 174 mg/dL (21 Nov 2021 22:07)  POCT Blood Glucose.: 167 mg/dL (21 Nov 2021 16:24)              RADIOLOGY & ADDITIONAL TESTS:    Imaging Personally Reviewed:  [x ] YES  [ ] NO

## 2021-11-23 NOTE — PROGRESS NOTE ADULT - ASSESSMENT
70yo M w/ PMHx of CAD (s/p CABG 2019), CKD (unknown stage), DM2, Parkinson's Disease, HTN, depression presents with new onset acute heart failure exacerbation, NSTEMI, started on hep gtt, developed bl RP bleed, acute anemia. sp MICU course for hemorrhagic shock, 10/28 sp IR embolization l4 and l5 lumbar artery. for permacath and AVF.    # Acute systolic and diastolic heart failure, improved  # NSTEMI- conservative mgmt dt renal function and anemia-bld loss  # ESRD, new HD  # Atrial flutter  # acute hypoxic resp failure, improved  # hemorrhagic shock, left RP hematoma, acute blood loss anemia  # lupus anticoagulant +  Echo TTE mod to severe LV SD, hypokinesis anterior wall, not candidate for cath at this point  HD per renal  10/28 sp IR embolization l4 and l5 lumbar artery  on midodrine during dialysis  WBC stable, improved overall  sp permacath placement  sp L AVF  new left UE swelling, hematoma noted on doppler US   concern for bleeding with drop in hgb, transfuse 1 unit prbc and 2 unit FFP  LUE doppler noted  vascular following- ace wrapping, pulses palpable    # DM2 (diabetes mellitus, type 2)  per endo  hba1c 6.4    # CAD (coronary artery disease)  # HTN  cont lopressor  c/w atorvastatin  asa on hold    # Parkinsons disease   # delirium  at baseline pt is poor historian but remains confused  remains on restraints as pulling at permacath  psych recs seroquel and olanzpine prn if agitated  carbidopa/levodopa   c/w trihexyphenidyl  psych fu to optimize meds and assist with dc to rehab  palliative cs appreciated- family wish pt to be made DNR/I    PCP Dr. Ronak proctor planning to rehab-pending behaviour control  Dr Rogers will be covering  starting 11/24/21  please call [facility] @ [phone] for questions or concerns

## 2021-11-23 NOTE — CHART NOTE - NSCHARTNOTEFT_GEN_A_CORE
IR requested to evaluate the right tunnelled HD catheter for possible dislodgement after the patient pulled at the catheter himself.   The site looks WNL and the cuff appears to be well under the skin and sutures are still intact. No bleeding or hematoma at the site. The site was cleaned and a new sterile dressing was applied. CXR was done and it looks like the catheter tip is in good position within the SVC/ Cavoatrial junction. Ok to use the right tunnelled HD catheter immediately. Final CXR report is pending. D/w primary team SKYLER Valdez.    KALEY Boyd  Available on [employer] Teams  Spectralink [phone]  Ext [phone]

## 2021-11-23 NOTE — PROGRESS NOTE ADULT - SUBJECTIVE AND OBJECTIVE BOX
Patient is a 71y old  Male who presents with a chief complaint of leg swelling (23 Nov 2021 13:44)      INTERVAL HPI/OVERNIGHT EVENTS: noted  pt seen and examined this am   events noted  aggressive-pulled on permacath slightly  calm and cooperative this am, mittens on      Vital Signs Last 24 Hrs  T(C): 36.8 (23 Nov 2021 12:27), Max: 37.1 (22 Nov 2021 17:37)  T(F): 98.3 (23 Nov 2021 12:27), Max: 98.7 (22 Nov 2021 17:37)  HR: 98 (23 Nov 2021 12:27) (93 - 110)  BP: 134/67 (23 Nov 2021 12:27) (122/72 - 135/75)  BP(mean): --  RR: 18 (23 Nov 2021 12:27) (18 - 18)  SpO2: 99% (23 Nov 2021 12:27) (98% - 100%)    acetaminophen     Tablet .. 650 milliGRAM(s) Oral every 6 hours PRN  albuterol/ipratropium for Nebulization 3 milliLiter(s) Nebulizer every 6 hours PRN  atorvastatin 80 milliGRAM(s) Oral at bedtime  carbidopa/levodopa  25/100 2 Tablet(s) Oral <User Schedule>  carbidopa/levodopa  25/100 2.5 Tablet(s) Oral <User Schedule>  chlorhexidine 4% Liquid 1 Application(s) Topical <User Schedule>  cholecalciferol 1000 Unit(s) Oral daily  dextrose 5%. 1000 milliLiter(s) IV Continuous <Continuous>  dextrose 5%. 1000 milliLiter(s) IV Continuous <Continuous>  dextrose 50% Injectable 25 Gram(s) IV Push once  dextrose 50% Injectable 25 Gram(s) IV Push once  dextrose 50% Injectable 12.5 Gram(s) IV Push once  folic acid 1 milliGRAM(s) Oral daily  glucagon  Injectable 1 milliGRAM(s) IntraMuscular once  guaiFENesin Oral Liquid (Sugar-Free) 200 milliGRAM(s) Oral every 6 hours PRN  insulin glargine Injectable (LANTUS) 6 Unit(s) SubCutaneous at bedtime  insulin lispro (ADMELOG) corrective regimen sliding scale   SubCutaneous three times a day before meals  insulin lispro (ADMELOG) corrective regimen sliding scale   SubCutaneous at bedtime  levothyroxine 25 MICROGram(s) Oral daily  memantine 5 milliGRAM(s) Oral at bedtime  metoprolol tartrate 25 milliGRAM(s) Oral two times a day  midodrine 10 milliGRAM(s) Oral <User Schedule>  Nephro-celeste 1 Tablet(s) Oral daily  polyethylene glycol 3350 17 Gram(s) Oral two times a day  QUEtiapine 25 milliGRAM(s) Oral every 6 hours PRN  QUEtiapine 12.5 milliGRAM(s) Oral daily  senna Syrup 10 milliLiter(s) Oral at bedtime  sodium chloride 0.9% lock flush 10 milliLiter(s) IV Push every 1 hour PRN  trihexyphenidyl 2 milliGRAM(s) Oral three times a day      PHYSICAL EXAM:  GENERAL: NAD,   EYES: conjunctiva and sclera clear  ENMT: Moist mucous membranes  NECK: Supple, No JVD, Normal thyroid  CHEST/LUNG: non labored, cta b/l  HEART: Regular rate and rhythm; No murmurs, rubs, or gallops  ABDOMEN: Soft, Nontender, Nondistended; Bowel sounds present  EXTREMITIES:  2+ Peripheral Pulses, No clubbing, cyanosis, or edema  LYMPH: No lymphadenopathy noted  SKIN: No rashes or lesions    Consultant(s) Notes Reviewed:  [x ] YES  [ ] NO  Care Discussed with Consultants/Other Providers [ x] YES  [ ] NO    LABS:                        9.8    9.22  )-----------( 287      ( 23 Nov 2021 06:12 )             32.2     11-23    134<L>  |  96  |  38<H>  ----------------------------<  132<H>  4.1   |  23  |  4.49<H>    Ca    9.7      23 Nov 2021 06:12  Phos  4.1     11-23          CAPILLARY BLOOD GLUCOSE      POCT Blood Glucose.: 188 mg/dL (23 Nov 2021 12:12)  POCT Blood Glucose.: 150 mg/dL (23 Nov 2021 08:24)  POCT Blood Glucose.: 229 mg/dL (22 Nov 2021 21:10)  POCT Blood Glucose.: 131 mg/dL (22 Nov 2021 16:48)              RADIOLOGY & ADDITIONAL TESTS:    Imaging Personally Reviewed:  [x ] YES  [ ] NO

## 2021-11-23 NOTE — PROGRESS NOTE ADULT - SUBJECTIVE AND OBJECTIVE BOX
Chief complaint  Patient is a 71y old  Male who presents with a chief complaint of leg swelling (23 Nov 2021 09:12)   Review of systems  Patient in bed, looks comfortable, no hypoglycemic episodes.    Labs and Fingersticks  CAPILLARY BLOOD GLUCOSE      POCT Blood Glucose.: 188 mg/dL (23 Nov 2021 12:12)  POCT Blood Glucose.: 150 mg/dL (23 Nov 2021 08:24)  POCT Blood Glucose.: 229 mg/dL (22 Nov 2021 21:10)  POCT Blood Glucose.: 131 mg/dL (22 Nov 2021 16:48)  POCT Blood Glucose.: 154 mg/dL (22 Nov 2021 13:55)      Anion Gap, Serum: 15 (11-23 @ 06:12)  Anion Gap, Serum: 16 (11-22 @ 10:39)      Calcium, Total Serum: 9.7 (11-23 @ 06:12)  Calcium, Total Serum: 9.4 (11-22 @ 10:39)          11-23    134<L>  |  96  |  38<H>  ----------------------------<  132<H>  4.1   |  23  |  4.49<H>    Ca    9.7      23 Nov 2021 06:12  Phos  4.1     11-23                          9.8    9.22  )-----------( 287      ( 23 Nov 2021 06:12 )             32.2     Medications  MEDICATIONS  (STANDING):  atorvastatin 80 milliGRAM(s) Oral at bedtime  carbidopa/levodopa  25/100 2.5 Tablet(s) Oral <User Schedule>  carbidopa/levodopa  25/100 2 Tablet(s) Oral <User Schedule>  chlorhexidine 4% Liquid 1 Application(s) Topical <User Schedule>  cholecalciferol 1000 Unit(s) Oral daily  dextrose 5%. 1000 milliLiter(s) (50 mL/Hr) IV Continuous <Continuous>  dextrose 5%. 1000 milliLiter(s) (100 mL/Hr) IV Continuous <Continuous>  dextrose 50% Injectable 25 Gram(s) IV Push once  dextrose 50% Injectable 25 Gram(s) IV Push once  dextrose 50% Injectable 12.5 Gram(s) IV Push once  folic acid 1 milliGRAM(s) Oral daily  glucagon  Injectable 1 milliGRAM(s) IntraMuscular once  insulin glargine Injectable (LANTUS) 6 Unit(s) SubCutaneous at bedtime  insulin lispro (ADMELOG) corrective regimen sliding scale   SubCutaneous three times a day before meals  insulin lispro (ADMELOG) corrective regimen sliding scale   SubCutaneous at bedtime  levothyroxine 25 MICROGram(s) Oral daily  memantine 5 milliGRAM(s) Oral at bedtime  metoprolol tartrate 25 milliGRAM(s) Oral two times a day  midodrine 10 milliGRAM(s) Oral <User Schedule>  Nephro-celeste 1 Tablet(s) Oral daily  polyethylene glycol 3350 17 Gram(s) Oral two times a day  QUEtiapine 12.5 milliGRAM(s) Oral daily  senna Syrup 10 milliLiter(s) Oral at bedtime  trihexyphenidyl 2 milliGRAM(s) Oral three times a day      Physical Exam  General: Patient comfortable in bed  Vital Signs Last 12 Hrs  T(F): 98.3 (11-23-21 @ 12:27), Max: 98.3 (11-23-21 @ 12:27)  HR: 98 (11-23-21 @ 12:27) (98 - 110)  BP: 134/67 (11-23-21 @ 12:27) (127/81 - 134/67)  BP(mean): --  RR: 18 (11-23-21 @ 12:27) (18 - 18)  SpO2: 99% (11-23-21 @ 12:27) (98% - 99%)    Diagnostics    Free Thyroxine, Serum: AM Sched. Collection: 06-Nov-2021 06:00 (11-05 @ 13:18)  Free Thyroxine, Serum: AM Sched. Collection: 26-Oct-2021 06:00 (10-25 @ 11:45)  Thyroid Stimulating Hormone, Serum: AM Sched. Collection: 26-Oct-2021 06:00 (10-25 @ 11:45)           Chief complaint  Patient is a 71y old  Male who presents with a chief complaint of leg swelling (23 Nov 2021 09:12)   Review of systems  Patient in bed, looks comfortable, no hypoglycemic episodes.    Labs and Fingersticks  CAPILLARY BLOOD GLUCOSE      POCT Blood Glucose.: 188 mg/dL (23 Nov 2021 12:12)  POCT Blood Glucose.: 150 mg/dL (23 Nov 2021 08:24)  POCT Blood Glucose.: 229 mg/dL (22 Nov 2021 21:10)  POCT Blood Glucose.: 131 mg/dL (22 Nov 2021 16:48)  POCT Blood Glucose.: 154 mg/dL (22 Nov 2021 13:55)      Anion Gap, Serum: 15 (11-23 @ 06:12)  Anion Gap, Serum: 16 (11-22 @ 10:39)      Calcium, Total Serum: 9.7 (11-23 @ 06:12)  Calcium, Total Serum: 9.4 (11-22 @ 10:39)          11-23    134<L>  |  96  |  38<H>  ----------------------------<  132<H>  4.1   |  23  |  4.49<H>    Ca    9.7      23 Nov 2021 06:12  Phos  4.1     11-23                          9.8    9.22  )-----------( 287      ( 23 Nov 2021 06:12 )             32.2     Medications  MEDICATIONS  (STANDING):  atorvastatin 80 milliGRAM(s) Oral at bedtime  carbidopa/levodopa  25/100 2.5 Tablet(s) Oral <User Schedule>  carbidopa/levodopa  25/100 2 Tablet(s) Oral <User Schedule>  chlorhexidine 4% Liquid 1 Application(s) Topical <User Schedule>  cholecalciferol 1000 Unit(s) Oral daily  dextrose 5%. 1000 milliLiter(s) (50 mL/Hr) IV Continuous <Continuous>  dextrose 5%. 1000 milliLiter(s) (100 mL/Hr) IV Continuous <Continuous>  dextrose 50% Injectable 25 Gram(s) IV Push once  dextrose 50% Injectable 25 Gram(s) IV Push once  dextrose 50% Injectable 12.5 Gram(s) IV Push once  folic acid 1 milliGRAM(s) Oral daily  glucagon  Injectable 1 milliGRAM(s) IntraMuscular once  insulin glargine Injectable (LANTUS) 6 Unit(s) SubCutaneous at bedtime  insulin lispro (ADMELOG) corrective regimen sliding scale   SubCutaneous three times a day before meals  insulin lispro (ADMELOG) corrective regimen sliding scale   SubCutaneous at bedtime  levothyroxine 25 MICROGram(s) Oral daily  memantine 5 milliGRAM(s) Oral at bedtime  metoprolol tartrate 25 milliGRAM(s) Oral two times a day  midodrine 10 milliGRAM(s) Oral <User Schedule>  Nephro-celeste 1 Tablet(s) Oral daily  polyethylene glycol 3350 17 Gram(s) Oral two times a day  QUEtiapine 12.5 milliGRAM(s) Oral daily  senna Syrup 10 milliLiter(s) Oral at bedtime  trihexyphenidyl 2 milliGRAM(s) Oral three times a day      Physical Exam  General: Patient comfortable in bed  Vital Signs Last 12 Hrs  T(F): 98.3 (11-23-21 @ 12:27), Max: 98.3 (11-23-21 @ 12:27)  HR: 98 (11-23-21 @ 12:27) (98 - 110)  BP: 134/67 (11-23-21 @ 12:27) (127/81 - 134/67)  BP(mean): --  RR: 18 (11-23-21 @ 12:27) (18 - 18)  SpO2: 99% (11-23-21 @ 12:27) (98% - 99%)    Diagnostics    Free Thyroxine, Serum: AM Sched. Collection: 06-Nov-2021 06:00 (11-05 @ 13:18)  Free Thyroxine, Serum: AM Sched. Collection: 26-Oct-2021 06:00 (10-25 @ 11:45)  Thyroid Stimulating Hormone, Serum: AM Sched. Collection: 26-Oct-2021 06:00 (10-25 @ 11:45)

## 2021-11-23 NOTE — CHART NOTE - NSCHARTNOTEFT_GEN_A_CORE
PA Medicine Event Note    Notified by RN patient's permacath was pulled out a little by patient.  Pt seen at bedside- NAD laying in bed. No resp distress. Pt with no complaints. Pt A&O x2 confused.    B/L mittens ordered.   IR notified and CXR ordered.  Permcath evaluated by IR at bedside- see IR PA's note.  CXR final result showing tip of permacath in R atrium. Called IR and spoke to provider on phone  regarding result PA Medicine Event Note    Notified by RN patient's permacath was pulled out a little by patient.  Pt seen at bedside- NAD laying in bed. No resp distress. Pt with no complaints. Pt A&O x2 confused.    B/L mittens ordered.   IR notified and CXR ordered.  Permcath evaluated by IR at bedside- see IR PA's note.  CXR final result showing tip of permacath in R atrium. Called IR and spoke to provider on phone  regarding result- per IR placement is correct.    Discussed w/ neuro Dr. Fang about Seroquel dosing and confirmed to change Seroquel back to 12.5 mg BID standing in addition to   prn. EKG was done earlier and QTC <500.  ABove discussed with Dr. Miriam mohan to monitor pt. will endorse to night team    Gretchen Valdez PA-C  Dept of Medicine  #[id number]

## 2021-11-23 NOTE — PROGRESS NOTE ADULT - SUBJECTIVE AND OBJECTIVE BOX
[facility]      Seen earlier today, and examined.  - Today, patient is without complaints.           *****MEDICATIONS: Current medication reviewed and documented.    MEDICATIONS  (STANDING):  atorvastatin 80 milliGRAM(s) Oral at bedtime  carbidopa/levodopa  25/100 2.5 Tablet(s) Oral <User Schedule>  carbidopa/levodopa  25/100 2 Tablet(s) Oral <User Schedule>  chlorhexidine 4% Liquid 1 Application(s) Topical <User Schedule>  cholecalciferol 1000 Unit(s) Oral daily  dextrose 5%. 1000 milliLiter(s) (50 mL/Hr) IV Continuous <Continuous>  dextrose 5%. 1000 milliLiter(s) (100 mL/Hr) IV Continuous <Continuous>  dextrose 50% Injectable 25 Gram(s) IV Push once  dextrose 50% Injectable 25 Gram(s) IV Push once  dextrose 50% Injectable 12.5 Gram(s) IV Push once  folic acid 1 milliGRAM(s) Oral daily  glucagon  Injectable 1 milliGRAM(s) IntraMuscular once  insulin glargine Injectable (LANTUS) 6 Unit(s) SubCutaneous at bedtime  insulin lispro (ADMELOG) corrective regimen sliding scale   SubCutaneous three times a day before meals  insulin lispro (ADMELOG) corrective regimen sliding scale   SubCutaneous at bedtime  levothyroxine 25 MICROGram(s) Oral daily  memantine 5 milliGRAM(s) Oral at bedtime  metoprolol tartrate 25 milliGRAM(s) Oral two times a day  midodrine 10 milliGRAM(s) Oral <User Schedule>  Nephro-celeste 1 Tablet(s) Oral daily  polyethylene glycol 3350 17 Gram(s) Oral two times a day  QUEtiapine 12.5 milliGRAM(s) Oral daily  senna Syrup 10 milliLiter(s) Oral at bedtime  trihexyphenidyl 2 milliGRAM(s) Oral three times a day    MEDICATIONS  (PRN):  acetaminophen     Tablet .. 650 milliGRAM(s) Oral every 6 hours PRN Mild Pain (1 - 3)  albuterol/ipratropium for Nebulization 3 milliLiter(s) Nebulizer every 6 hours PRN Shortness of Breath and/or Wheezing  guaiFENesin Oral Liquid (Sugar-Free) 200 milliGRAM(s) Oral every 6 hours PRN Cough  QUEtiapine 25 milliGRAM(s) Oral every 6 hours PRN Agitation  sodium chloride 0.9% lock flush 10 milliLiter(s) IV Push every 1 hour PRN Pre/post blood products, medications, blood draw, and to maintain line patency          ***** VITAL SIGNS:  T(F): 98.3 (21 @ 12:27), Max: 98.7 (21 @ 17:37)  HR: 98 (21 @ 12:27) (93 - 110)  BP: 134/67 (21 @ 12:27) (122/72 - 143/79)  RR: 18 (21 @ 12:27) (18 - 18)  SpO2: 99% (21 @ 12:27) (97% - 100%)  Wt(kg): --  ,   I&O's Summary    2021 07:01  -  2021 07:00  --------------------------------------------------------  IN: 1080 mL / OUT: 1600 mL / NET: -520 mL             *****PHYSICAL EXAM:   alert oriented x 2 attention comprehension are limited as pt is not cooperative   remains with limited insight   Able to name, repeat.   EOmi fundi not visualized    Tongue is midline     Moving all 4 ext spontaneously no drift appreciated    Gait not assessed.            *****LAB AND IMAGIN.8    9.22  )-----------( 287      ( 2021 06:12 )             32.2                   134<L>  |  96  |  38<H>  ----------------------------<  132<H>  4.1   |  23  |  4.49<H>    Ca    9.7      2021 06:12  Phos  4.1                                [All pertinent recent Imaging/Reports reviewed]           *****A S S E S S M E N T   A N D   P L A N :      Excerpt from H&P,"  72yo M w/ PMHx of CAD (s/p CABG 2019), CKD (unknown stage), DM2, Parkinson's Disease, HTN, depression presents with bilateral leg swelling, patient's daughter in-law at bedside Elsy Qiuntero (4 [facility] Nurse) provides the history, the daughter reports the patient is a poor historian, unable to remember having conversations with others and likely cannot weigh risk/benefits of medical conditions, she reports that he has had bilateral lower extremity swelling for the past few months, but over the past 2 weeks it has significantly worsened, he has further difficulty ambulating due to swelling, his outpatient provider attempted to manage with 20mg lasix and then increased to 40mg lasix but the patient never took the increased dose, his symptoms ar persistent throughout the day and are extremely severe, he has developed wounds of the legs with weeping of fluid due to the swelling, she reports that the patient is frequently non-compliant with medications and medical management, he lives at home with his son and daughter in law, he denies chest pain, shortness of breath, fever/chills, cough, sputum, in the ED, he was tachycardic but hemodynamically stable, afebrile, saturating well on room air, labs were significant for elevated BNP, elevated creatinine, imaging showed moderate left pleural effusion, patient was admitted to general medicine for further management          Problem/Recommendations 1:ams of unclear etiology   likely related to multiple metabolic derangements related to uremia  sleep wake cycle disruption and poor nutritional intake  would regulate sleep wake cycle  check b12/b1 folate/tsh /ammonia wnl   thiamine 500 iv q 8 x 2 days after checking vitamin b1 level   nutrition consult for severe protein caloric malnutrition   discussed with elsy( daughter in law), agreeing to start namenda 5 mg qhs probable early lewy body dementia related paranoia and agitation   plan likely needs to adjust dose of antipsychotics, however avoid typical antipsychotics  elsy denies any alcohol intake at all.     lowered seroquel 12.5 due to sedation    over night pt did not sleep   now fell asleep as per aide at 7.15   regulate sleep wake cycle   dc am dose of seroquel   increase night time dose to regulate sleep wake cycle        more awake   conversant.   still somewhat agitation   does follow simple commands      Problem/Recommendations 2: weakness   r/o orthostatic hypotension   pt shaking on standing up    prob deconditioning   pt eval   oob to chair as tolerated        Problem/Recommendations 3: parkinsons   continue current regimen   sinemet 2. 5 twice a day and 2 mg qh    Thank you for allowing me to participate in the care of this patient. Will continue to follow patient periodically. Please do not hesitate to call me if you have any  questions or if there has been a change in patients neurological status     ________________  Lily Fang MD  [facility] [place] ([facility]  [phone]      33 minutes spent on total encounter; more than 50 % of the visit was  spent counseling about plan of care, compliance to diet/exercise and medication regimen and or  coordinating care by the attending physician.      It is advised that stroke patients follow up with ZACH Albarran @ [phone] in 1- 2 weeks.   Others please follow up with Dr. Michael Nissenbaum [phone]

## 2021-11-23 NOTE — PROGRESS NOTE ADULT - ASSESSMENT
Assessment  DMT2: 71y Male with DM T2 with hyperglycemia, A1C 6.4%, was on insulin at home, now on low-dose basal insulin and coverage, blood sugars are fluctuating within overall acceptable range, no hypoglycemic episodes. Patient is eating partial meals, ?UE hematoma requiring transfusion, NAD.  Hypothyroidism: Patient has no history thyroid disease, was not on any thyroid supplements, subclinical with low-normal FT4, lethargic, started on synthroid 25 mcg po daily, FT4 improved to 1.2.  CHF: on medications, stable, monitored.  HTN: Controlled,  on antihypertensive medications.  Parkinsons: on meds, monitored.  CKD: Monitor labs/BMP      Kelsie Reece MD  Cell: [phone]  Office: [phone]     Assessment  DMT2: 71y Male with DM T2 with hyperglycemia, A1C 6.4%, was on insulin at home, now on low-dose basal insulin and coverage, blood sugars are fluctuating within overall acceptable range, no hypoglycemic episodes. Patient is eating partial meals, ?UE hematoma  requiring transfusion, NAD.  Hypothyroidism: Patient has no history thyroid disease, was not on any thyroid supplements, subclinical with low-normal FT4, lethargic, started on synthroid 25 mcg po daily, FT4 improved to 1.2.  CHF: on medications, stable, monitored.  HTN: Controlled,  on antihypertensive medications.  Parkinsons: on meds, monitored.  CKD: Monitor labs/BMP      Kelsie Reece MD  Cell: [phone]  Office: [phone]

## 2021-11-23 NOTE — PROGRESS NOTE ADULT - SUBJECTIVE AND OBJECTIVE BOX
NEPHROLOGY-[facility] [phone]        Patient seen and examined in bed.  Not agitated this am         MEDICATIONS  (STANDING):  atorvastatin 80 milliGRAM(s) Oral at bedtime  carbidopa/levodopa  25/100 2 Tablet(s) Oral <User Schedule>  carbidopa/levodopa  25/100 2.5 Tablet(s) Oral <User Schedule>  chlorhexidine 4% Liquid 1 Application(s) Topical <User Schedule>  cholecalciferol 1000 Unit(s) Oral daily  dextrose 5%. 1000 milliLiter(s) (50 mL/Hr) IV Continuous <Continuous>  dextrose 5%. 1000 milliLiter(s) (100 mL/Hr) IV Continuous <Continuous>  dextrose 50% Injectable 25 Gram(s) IV Push once  dextrose 50% Injectable 25 Gram(s) IV Push once  dextrose 50% Injectable 12.5 Gram(s) IV Push once  folic acid 1 milliGRAM(s) Oral daily  glucagon  Injectable 1 milliGRAM(s) IntraMuscular once  insulin glargine Injectable (LANTUS) 6 Unit(s) SubCutaneous at bedtime  insulin lispro (ADMELOG) corrective regimen sliding scale   SubCutaneous three times a day before meals  insulin lispro (ADMELOG) corrective regimen sliding scale   SubCutaneous at bedtime  levothyroxine 25 MICROGram(s) Oral daily  memantine 5 milliGRAM(s) Oral at bedtime  metoprolol tartrate 25 milliGRAM(s) Oral two times a day  midodrine 10 milliGRAM(s) Oral <User Schedule>  Nephro-celeste 1 Tablet(s) Oral daily  polyethylene glycol 3350 17 Gram(s) Oral two times a day  QUEtiapine 12.5 milliGRAM(s) Oral daily  senna Syrup 10 milliLiter(s) Oral at bedtime  trihexyphenidyl 2 milliGRAM(s) Oral three times a day      VITAL:  T(C): , Max: 37.1 (11-22-21 @ 17:37)  T(F): , Max: 98.7 (11-22-21 @ 17:37)  HR: 110 (11-23-21 @ 05:25)  BP: 127/81 (11-23-21 @ 05:25)  BP(mean): --  RR: 18 (11-23-21 @ 05:25)  SpO2: 98% (11-23-21 @ 05:25)  Wt(kg): --    I and O's:    11-22 @ 07:01  -  11-23 @ 07:00  --------------------------------------------------------  IN: 1080 mL / OUT: 1600 mL / NET: -520 mL          PHYSICAL EXAM:    Constitutional: NAD  Neck:  No JVD  Respiratory: CTAB/L  Cardiovascular: S1 and S2  Gastrointestinal: BS+, soft, NT/ND  Extremities: No peripheral edema  Neurological: A/O x 3, no focal deficits  Psychiatric: Normal mood, normal affect  : No Javier  Skin: No rashes  Access: perm cath and avg c staples     LABS:                        9.8    9.22  )-----------( 287      ( 23 Nov 2021 06:12 )             32.2     11-23    134<L>  |  96  |  38<H>  ----------------------------<  132<H>  4.1   |  23  |  4.49<H>    Ca    9.7      23 Nov 2021 06:12  Phos  4.1     11-23            Urine Studies:          RADIOLOGY & ADDITIONAL STUDIES:

## 2021-11-23 NOTE — PROGRESS NOTE ADULT - ASSESSMENT
72yo M w/ PMHx of CAD (s/p CABG 2019), CKD (unknown stage), DM2, Parkinson's Disease, HTN, depression presents with bilateral leg swelling  ESRD   Severe LV dysfunction ;  A flutter   Confusion - alert at present     1 IR-  s/p left upper avf access      2 Renal-  HD in am     3 CVS- BP controlled at present, on Midodrine (with hold parameters in place, SBP> 160),  Lopressor for heart rate control     4 Anemia - hgb slightly trending down,   Retacrit 10k units at HD     5 Vasc - s/p  LUE AVF  with intact staples(vasc to fu re dc the staples)    DC planning to subacute with HD     [facility]   [facility]   [phone]

## 2021-11-24 NOTE — PROGRESS NOTE ADULT - ASSESSMENT
72yo M w/ PMHx of CAD (s/p CABG 2019), CKD (unknown stage), DM2, Parkinson's Disease, HTN, depression presents with bilateral leg swelling  ESRD   Severe LV dysfunction ;  A flutter   Confusion -  at present     1 IR-  Perm cath was evaluated by IR and he is back on restraints     2 Renal-  HD today     3 CVS- BP controlled at present, on Midodrine (with hold parameters in place, SBP> 160),  Lopressor for heart rate control     4 Anemia - hgb slightly trending down,   Retacrit 10k units at HD     5 Vasc - s/p  LUE AVF  with intact staples(vasc to fu re dc the staples)    Question is if he will be a candidate for long term HD.  He is not limited by his medical issues but more specifically his mental status.  Pulling on the perm cath at a rehab if he is not supervised could be disastrous     Sayed [facility]   [phone]  Pain contract signed in visit faxed to HIM and sent to scan

## 2021-11-24 NOTE — PROGRESS NOTE ADULT - ASSESSMENT
ABBY now on HD  Acute on chronic systolic CHF with fluid overload  Bilateral L>>R pleural effusions  S/P RP bleed on AC   Atrial Flutter/Fib    REC    DC planning primary team  HD per renal

## 2021-11-24 NOTE — CHART NOTE - NSCHARTNOTEFT_GEN_A_CORE
Pt with continued agitation, pulling on IV Lines, reported hallucination of bugs crawling during last shift  Psyche reeval today - changed seroquel due to prolonged QTc to Depakote 250mg BID and 125mg Q8PRN  Monitor response  [id number]

## 2021-11-24 NOTE — PROGRESS NOTE ADULT - SUBJECTIVE AND OBJECTIVE BOX
[facility] [phone]        Patient seen and examined in bed.  He was the same   On restraints this am   Pulled at the perm cath yesterday         MEDICATIONS  (STANDING):  atorvastatin 80 milliGRAM(s) Oral at bedtime  carbidopa/levodopa  25/100 2.5 Tablet(s) Oral <User Schedule>  carbidopa/levodopa  25/100 2 Tablet(s) Oral <User Schedule>  chlorhexidine 4% Liquid 1 Application(s) Topical <User Schedule>  cholecalciferol 1000 Unit(s) Oral daily  dextrose 5%. 1000 milliLiter(s) (50 mL/Hr) IV Continuous <Continuous>  dextrose 5%. 1000 milliLiter(s) (100 mL/Hr) IV Continuous <Continuous>  dextrose 50% Injectable 25 Gram(s) IV Push once  dextrose 50% Injectable 12.5 Gram(s) IV Push once  dextrose 50% Injectable 25 Gram(s) IV Push once  folic acid 1 milliGRAM(s) Oral daily  glucagon  Injectable 1 milliGRAM(s) IntraMuscular once  insulin glargine Injectable (LANTUS) 6 Unit(s) SubCutaneous at bedtime  insulin lispro (ADMELOG) corrective regimen sliding scale   SubCutaneous three times a day before meals  insulin lispro (ADMELOG) corrective regimen sliding scale   SubCutaneous at bedtime  levothyroxine 25 MICROGram(s) Oral daily  memantine 5 milliGRAM(s) Oral at bedtime  metoprolol tartrate 25 milliGRAM(s) Oral two times a day  midodrine 10 milliGRAM(s) Oral <User Schedule>  Nephro-celeste 1 Tablet(s) Oral daily  polyethylene glycol 3350 17 Gram(s) Oral two times a day  QUEtiapine 12.5 milliGRAM(s) Oral two times a day  senna Syrup 10 milliLiter(s) Oral at bedtime  trihexyphenidyl 2 milliGRAM(s) Oral three times a day      VITAL:  T(C): , Max: 37 (11-23-21 @ 19:04)  T(F): , Max: 98.6 (11-23-21 @ 19:04)  HR: 84 (11-24-21 @ 05:45)  BP: 143/80 (11-24-21 @ 05:45)  BP(mean): --  RR: 18 (11-24-21 @ 05:45)  SpO2: 98% (11-24-21 @ 05:45)  Wt(kg): --    I and O's:    11-23 @ 07:01  -  11-24 @ 07:00  --------------------------------------------------------  IN: 390 mL / OUT: 0 mL / NET: 390 mL          PHYSICAL EXAM:    Constitutional: NAD  Neck:  No JVD  Respiratory: CTAB/L  Cardiovascular: S1 and S2  Gastrointestinal: BS+, soft, NT/ND  Extremities: No peripheral edema  Neurological:  no focal deficits  Psychiatric:    : No Javier  Skin: No rashes  Access: avg and perm cath    LABS:                        9.3    9.15  )-----------( 282      ( 24 Nov 2021 06:13 )             31.0     11-24    130<L>  |  94<L>  |  58<H>  ----------------------------<  180<H>  4.2   |  22  |  5.77<H>    Ca    9.6      24 Nov 2021 06:13  Phos  4.5     11-24            Urine Studies:          RADIOLOGY & ADDITIONAL STUDIES:

## 2021-11-24 NOTE — PROGRESS NOTE ADULT - SUBJECTIVE AND OBJECTIVE BOX
Follow-up Pulm Progress Note  Brayan Verma MD  [phone]    Breathing comforably on room air: sats in mid 90's  No new resp issues    Vital Signs Last 24 Hrs  T(C): 36.6 (24 Nov 2021 11:24), Max: 37 (23 Nov 2021 19:04)  T(F): 97.9 (24 Nov 2021 11:24), Max: 98.6 (23 Nov 2021 19:04)  HR: 80 (24 Nov 2021 11:24) (79 - 85)  BP: 120/75 (24 Nov 2021 11:24) (120/75 - 144/83)  BP(mean): --  RR: 18 (24 Nov 2021 11:24) (18 - 18)  SpO2: 92% (24 Nov 2021 11:24) (92% - 100%)                          9.3    9.15  )-----------( 282      ( 24 Nov 2021 06:13 )             31.0     11-24    130<L>  |  94<L>  |  58<H>  ----------------------------<  180<H>  4.2   |  22  |  5.77<H>    Ca    9.6      24 Nov 2021 06:13  Phos  4.5     11-24      Physical Examination:  PULM: Diminished basilar BS  CVS: Regular rate and rhythm, no murmurs, rubs, or gallops  ABD: Soft, non-tender  EXT:  No clubbing, cyanosis, or edema    RADIOLOGY REVIEWED  CXR:    CT chest:    TTE:

## 2021-11-24 NOTE — PROGRESS NOTE ADULT - SUBJECTIVE AND OBJECTIVE BOX
Patient is a 71y old  Male who presents with a chief complaint of leg swelling (24 Nov 2021 14:13)      SUBJECTIVE / OVERNIGHT EVENTS:    Patient seen and examined. confused aggitated at times. does not want to be touched. cursing. neice at bedside.  states he sees bus. wants to wipe them from his eyes. pulling at his permacath OVN despite mittens.      Vital Signs Last 24 Hrs  T(C): 36.6 (24 Nov 2021 11:24), Max: 37 (23 Nov 2021 19:04)  T(F): 97.9 (24 Nov 2021 11:24), Max: 98.6 (23 Nov 2021 19:04)  HR: 80 (24 Nov 2021 11:24) (79 - 85)  BP: 120/75 (24 Nov 2021 11:24) (120/75 - 144/83)  BP(mean): --  RR: 18 (24 Nov 2021 11:24) (18 - 18)  SpO2: 92% (24 Nov 2021 11:24) (92% - 100%)  I&O's Summary    23 Nov 2021 07:01  -  24 Nov 2021 07:00  --------------------------------------------------------  IN: 390 mL / OUT: 0 mL / NET: 390 mL    24 Nov 2021 07:01  -  24 Nov 2021 14:46  --------------------------------------------------------  IN: 160 mL / OUT: 0 mL / NET: 160 mL        PE:  limited as pt refusing exam  GENERAL:  AAOx1  HEAD:  Atraumatic, Normocephalic  ABDOMEN: Soft, Nontender  EXTREMITIES:  2+ Peripheral Pulses, No  edema  NEURO: confused    LABS:                        9.3    9.15  )-----------( 282      ( 24 Nov 2021 06:13 )             31.0     11-24    130<L>  |  94<L>  |  58<H>  ----------------------------<  180<H>  4.2   |  22  |  5.77<H>    Ca    9.6      24 Nov 2021 06:13  Phos  4.5     11-24        CAPILLARY BLOOD GLUCOSE      POCT Blood Glucose.: 213 mg/dL (24 Nov 2021 11:38)  POCT Blood Glucose.: 170 mg/dL (24 Nov 2021 07:44)  POCT Blood Glucose.: 189 mg/dL (23 Nov 2021 21:32)  POCT Blood Glucose.: 150 mg/dL (23 Nov 2021 16:53)            RADIOLOGY & ADDITIONAL TESTS:    Imaging Personally Reviewed:  [x] YES  [ ] NO    Consultant(s) Notes Reviewed:  [x] YES  [ ] NO    MEDICATIONS  (STANDING):  atorvastatin 80 milliGRAM(s) Oral at bedtime  carbidopa/levodopa  25/100 2 Tablet(s) Oral <User Schedule>  carbidopa/levodopa  25/100 2.5 Tablet(s) Oral <User Schedule>  chlorhexidine 4% Liquid 1 Application(s) Topical <User Schedule>  cholecalciferol 1000 Unit(s) Oral daily  dextrose 5%. 1000 milliLiter(s) (50 mL/Hr) IV Continuous <Continuous>  dextrose 5%. 1000 milliLiter(s) (100 mL/Hr) IV Continuous <Continuous>  dextrose 50% Injectable 25 Gram(s) IV Push once  dextrose 50% Injectable 25 Gram(s) IV Push once  dextrose 50% Injectable 12.5 Gram(s) IV Push once  epoetin mari-epbx (RETACRIT) Injectable 65539 Unit(s) IV Push once  folic acid 1 milliGRAM(s) Oral daily  glucagon  Injectable 1 milliGRAM(s) IntraMuscular once  insulin glargine Injectable (LANTUS) 6 Unit(s) SubCutaneous at bedtime  insulin lispro (ADMELOG) corrective regimen sliding scale   SubCutaneous three times a day before meals  insulin lispro (ADMELOG) corrective regimen sliding scale   SubCutaneous at bedtime  levothyroxine 25 MICROGram(s) Oral daily  memantine 5 milliGRAM(s) Oral at bedtime  metoprolol tartrate 25 milliGRAM(s) Oral two times a day  midodrine 10 milliGRAM(s) Oral <User Schedule>  Nephro-celeste 1 Tablet(s) Oral daily  polyethylene glycol 3350 17 Gram(s) Oral two times a day  QUEtiapine 12.5 milliGRAM(s) Oral two times a day  senna Syrup 10 milliLiter(s) Oral at bedtime  trihexyphenidyl 2 milliGRAM(s) Oral three times a day    MEDICATIONS  (PRN):  acetaminophen     Tablet .. 650 milliGRAM(s) Oral every 6 hours PRN Mild Pain (1 - 3)  albuterol/ipratropium for Nebulization 3 milliLiter(s) Nebulizer every 6 hours PRN Shortness of Breath and/or Wheezing  guaiFENesin Oral Liquid (Sugar-Free) 200 milliGRAM(s) Oral every 6 hours PRN Cough  QUEtiapine 25 milliGRAM(s) Oral every 6 hours PRN Agitation  sodium chloride 0.9% lock flush 10 milliLiter(s) IV Push every 1 hour PRN Pre/post blood products, medications, blood draw, and to maintain line patency      Care Discussed with Consultants/Other Providers [x] YES  [ ] NO    HEALTH ISSUES - PROBLEM Dx:  Acute heart failure    Atrial flutter    DM2 (diabetes mellitus, type 2)    CAD (coronary artery disease)    Parkinsons disease    Acute on chronic renal failure    NSTEMI (non-ST elevation myocardial infarction)    Hypertension    Acute kidney injury superimposed on CKD    Anemia    Hypothyroidism    Delirium    Advanced care planning/counseling discussion    Palliative care status    Palliative care encounter    Pain    Stage 5 chronic kidney disease not on chronic dialysis

## 2021-11-24 NOTE — PROGRESS NOTE ADULT - SUBJECTIVE AND OBJECTIVE BOX
Chief complaint  Patient is a 71y old  Male who presents with a chief complaint of leg swelling (24 Nov 2021 12:50)   Review of systems  Patient in bed, looks comfortable, no hypoglycemic episodes.    Labs and Fingersticks  CAPILLARY BLOOD GLUCOSE      POCT Blood Glucose.: 213 mg/dL (24 Nov 2021 11:38)  POCT Blood Glucose.: 170 mg/dL (24 Nov 2021 07:44)  POCT Blood Glucose.: 189 mg/dL (23 Nov 2021 21:32)  POCT Blood Glucose.: 150 mg/dL (23 Nov 2021 16:53)      Anion Gap, Serum: 14 (11-24 @ 06:13)  Anion Gap, Serum: 15 (11-23 @ 06:12)      Calcium, Total Serum: 9.6 (11-24 @ 06:13)  Calcium, Total Serum: 9.7 (11-23 @ 06:12)          11-24    130<L>  |  94<L>  |  58<H>  ----------------------------<  180<H>  4.2   |  22  |  5.77<H>    Ca    9.6      24 Nov 2021 06:13  Phos  4.5     11-24                          9.3    9.15  )-----------( 282      ( 24 Nov 2021 06:13 )             31.0     Medications  MEDICATIONS  (STANDING):  atorvastatin 80 milliGRAM(s) Oral at bedtime  carbidopa/levodopa  25/100 2 Tablet(s) Oral <User Schedule>  carbidopa/levodopa  25/100 2.5 Tablet(s) Oral <User Schedule>  chlorhexidine 4% Liquid 1 Application(s) Topical <User Schedule>  cholecalciferol 1000 Unit(s) Oral daily  dextrose 5%. 1000 milliLiter(s) (50 mL/Hr) IV Continuous <Continuous>  dextrose 5%. 1000 milliLiter(s) (100 mL/Hr) IV Continuous <Continuous>  dextrose 50% Injectable 25 Gram(s) IV Push once  dextrose 50% Injectable 25 Gram(s) IV Push once  dextrose 50% Injectable 12.5 Gram(s) IV Push once  epoetin mari-epbx (RETACRIT) Injectable 59116 Unit(s) IV Push once  folic acid 1 milliGRAM(s) Oral daily  glucagon  Injectable 1 milliGRAM(s) IntraMuscular once  insulin glargine Injectable (LANTUS) 6 Unit(s) SubCutaneous at bedtime  insulin lispro (ADMELOG) corrective regimen sliding scale   SubCutaneous three times a day before meals  insulin lispro (ADMELOG) corrective regimen sliding scale   SubCutaneous at bedtime  levothyroxine 25 MICROGram(s) Oral daily  memantine 5 milliGRAM(s) Oral at bedtime  metoprolol tartrate 25 milliGRAM(s) Oral two times a day  midodrine 10 milliGRAM(s) Oral <User Schedule>  Nephro-celeste 1 Tablet(s) Oral daily  polyethylene glycol 3350 17 Gram(s) Oral two times a day  QUEtiapine 12.5 milliGRAM(s) Oral two times a day  senna Syrup 10 milliLiter(s) Oral at bedtime  trihexyphenidyl 2 milliGRAM(s) Oral three times a day      Physical Exam  General: Patient comfortable in bed  Vital Signs Last 12 Hrs  T(F): 97.9 (11-24-21 @ 11:24), Max: 97.9 (11-24-21 @ 11:24)  HR: 80 (11-24-21 @ 11:24) (80 - 84)  BP: 120/75 (11-24-21 @ 11:24) (120/75 - 143/80)  BP(mean): --  RR: 18 (11-24-21 @ 11:24) (18 - 18)  SpO2: 92% (11-24-21 @ 11:24) (92% - 98%)  Neck: No palpable thyroid nodules.  CVS: S1S2, No murmurs  Respiratory: No wheezing, no crepitations  GI: Abdomen soft, bowel sounds positive  Musculoskeletal:  edema lower extremities.   Skin: No skin rashes, no ecchymosis    Diagnostics    Free Thyroxine, Serum: AM Sched. Collection: 06-Nov-2021 06:00 (11-05 @ 13:18)           Chief complaint  Patient is a 71y old  Male who presents with a chief complaint of leg swelling (24 Nov 2021 12:50)   Review of systems  Patient in bed, looks comfortable, no hypoglycemic episodes.    Labs and Fingersticks  CAPILLARY BLOOD GLUCOSE      POCT Blood Glucose.: 213 mg/dL (24 Nov 2021 11:38)  POCT Blood Glucose.: 170 mg/dL (24 Nov 2021 07:44)  POCT Blood Glucose.: 189 mg/dL (23 Nov 2021 21:32)  POCT Blood Glucose.: 150 mg/dL (23 Nov 2021 16:53)      Anion Gap, Serum: 14 (11-24 @ 06:13)  Anion Gap, Serum: 15 (11-23 @ 06:12)      Calcium, Total Serum: 9.6 (11-24 @ 06:13)  Calcium, Total Serum: 9.7 (11-23 @ 06:12)          11-24    130<L>  |  94<L>  |  58<H>  ----------------------------<  180<H>  4.2   |  22  |  5.77<H>    Ca    9.6      24 Nov 2021 06:13  Phos  4.5     11-24                          9.3    9.15  )-----------( 282      ( 24 Nov 2021 06:13 )             31.0     Medications  MEDICATIONS  (STANDING):  atorvastatin 80 milliGRAM(s) Oral at bedtime  carbidopa/levodopa  25/100 2 Tablet(s) Oral <User Schedule>  carbidopa/levodopa  25/100 2.5 Tablet(s) Oral <User Schedule>  chlorhexidine 4% Liquid 1 Application(s) Topical <User Schedule>  cholecalciferol 1000 Unit(s) Oral daily  dextrose 5%. 1000 milliLiter(s) (50 mL/Hr) IV Continuous <Continuous>  dextrose 5%. 1000 milliLiter(s) (100 mL/Hr) IV Continuous <Continuous>  dextrose 50% Injectable 25 Gram(s) IV Push once  dextrose 50% Injectable 25 Gram(s) IV Push once  dextrose 50% Injectable 12.5 Gram(s) IV Push once  epoetin mari-epbx (RETACRIT) Injectable 65160 Unit(s) IV Push once  folic acid 1 milliGRAM(s) Oral daily  glucagon  Injectable 1 milliGRAM(s) IntraMuscular once  insulin glargine Injectable (LANTUS) 6 Unit(s) SubCutaneous at bedtime  insulin lispro (ADMELOG) corrective regimen sliding scale   SubCutaneous three times a day before meals  insulin lispro (ADMELOG) corrective regimen sliding scale   SubCutaneous at bedtime  levothyroxine 25 MICROGram(s) Oral daily  memantine 5 milliGRAM(s) Oral at bedtime  metoprolol tartrate 25 milliGRAM(s) Oral two times a day  midodrine 10 milliGRAM(s) Oral <User Schedule>  Nephro-celeste 1 Tablet(s) Oral daily  polyethylene glycol 3350 17 Gram(s) Oral two times a day  QUEtiapine 12.5 milliGRAM(s) Oral two times a day  senna Syrup 10 milliLiter(s) Oral at bedtime  trihexyphenidyl 2 milliGRAM(s) Oral three times a day      Physical Exam  General: Patient comfortable in bed  Vital Signs Last 12 Hrs  T(F): 97.9 (11-24-21 @ 11:24), Max: 97.9 (11-24-21 @ 11:24)  HR: 80 (11-24-21 @ 11:24) (80 - 84)  BP: 120/75 (11-24-21 @ 11:24) (120/75 - 143/80)  BP(mean): --  RR: 18 (11-24-21 @ 11:24) (18 - 18)  SpO2: 92% (11-24-21 @ 11:24) (92% - 98%)  Neck: No palpable thyroid nodules.  CVS: S1S2, No murmurs  Respiratory: No wheezing, no crepitations  GI: Abdomen soft, bowel sounds positive  Musculoskeletal:  edema lower extremities.   Skin: No skin rashes, no ecchymosis    Diagnostics    Free Thyroxine, Serum: AM Sched. Collection: 06-Nov-2021 06:00 (11-05 @ 13:18)

## 2021-11-24 NOTE — PROGRESS NOTE ADULT - ASSESSMENT
72yo M w/ PMHx of CAD (s/p CABG 2019), CKD (unknown stage), DM2, Parkinson's Disease, HTN, depression presents with new onset acute heart failure exacerbation, NSTEMI, started on hep gtt, developed bl RP bleed, acute anemia. sp MICU course for hemorrhagic shock, 10/28 sp IR embolization l4 and l5 lumbar artery. for permacath and AVF.    # Acute systolic and diastolic heart failure, improved  # NSTEMI- conservative mgmt dt renal function and anemia-bld loss  # ESRD, new HD  # Atrial flutter  # acute hypoxic resp failure, improved  # hemorrhagic shock, left RP hematoma, acute blood loss anemia  # lupus anticoagulant +  Echo TTE mod to severe LV SD, hypokinesis anterior wall, not candidate for cath at this point  HD per renal  10/28 sp IR embolization l4 and l5 lumbar artery  on midodrine during dialysis  sp permacath placement  sp L AVF  new left UE swelling, hematoma noted on doppler US, ace wrapping    # Parkinsons disease   # delirium  at baseline pt is poor historian  remains on restraints as pulling at permacath  psych recs seroquel and olanzpine prn if agitated  c/w trihexyphenidyl, carbidopa/levodopa   psych fu to optimize meds for aggitation confusion pulling on lines  palliative cs appreciated- family wish pt to be made DNR/I    # DM2 (diabetes mellitus, type 2)  per endo  hba1c 6.4    # CAD (coronary artery disease)  # HTN  cont lopressor  c/w atorvastatin  asa on hold    PCP Dr. Simons    dispo: dc planning to rehab has been difficult 2/2 behaviour control, psych fu    rachael heredia at bedside    please call [facility] @ [phone] for questions or concerns

## 2021-11-25 NOTE — PROGRESS NOTE ADULT - SUBJECTIVE AND OBJECTIVE BOX
Patient is a 71y old  Male who presents with a chief complaint of leg swelling (24 Nov 2021 14:46)      SUBJECTIVE / OVERNIGHT EVENTS:    Patient seen and examined.       Vital Signs Last 24 Hrs  T(C): 36.8 (25 Nov 2021 05:11), Max: 37 (24 Nov 2021 22:44)  T(F): 98.3 (25 Nov 2021 05:11), Max: 98.6 (24 Nov 2021 22:44)  HR: 94 (25 Nov 2021 05:11) (70 - 99)  BP: 149/71 (25 Nov 2021 05:11) (120/75 - 150/80)  BP(mean): --  RR: 18 (25 Nov 2021 05:11) (17 - 18)  SpO2: 100% (25 Nov 2021 05:11) (92% - 100%)  I&O's Summary    24 Nov 2021 07:01  -  25 Nov 2021 07:00  --------------------------------------------------------  IN: 400 mL / OUT: 1600 mL / NET: -1200 mL        PE:  limited as pt refusing exam  GENERAL:  AAOx1  HEAD:  Atraumatic, Normocephalic  ABDOMEN: Soft, Nontender  EXTREMITIES:  2+ Peripheral Pulses, No  edema  NEURO: confused    LABS:                        9.3    9.15  )-----------( 282      ( 24 Nov 2021 06:13 )             31.0     11-24    130<L>  |  94<L>  |  58<H>  ----------------------------<  180<H>  4.2   |  22  |  5.77<H>    Ca    9.6      24 Nov 2021 06:13  Phos  4.5     11-24        CAPILLARY BLOOD GLUCOSE      POCT Blood Glucose.: 173 mg/dL (25 Nov 2021 08:15)  POCT Blood Glucose.: 198 mg/dL (24 Nov 2021 22:35)  POCT Blood Glucose.: 198 mg/dL (24 Nov 2021 17:06)  POCT Blood Glucose.: 213 mg/dL (24 Nov 2021 11:38)            RADIOLOGY & ADDITIONAL TESTS:    Imaging Personally Reviewed:  [x] YES  [ ] NO    Consultant(s) Notes Reviewed:  [x] YES  [ ] NO    MEDICATIONS  (STANDING):  atorvastatin 80 milliGRAM(s) Oral at bedtime  carbidopa/levodopa  25/100 2 Tablet(s) Oral <User Schedule>  carbidopa/levodopa  25/100 2.5 Tablet(s) Oral <User Schedule>  chlorhexidine 4% Liquid 1 Application(s) Topical <User Schedule>  cholecalciferol 1000 Unit(s) Oral daily  dextrose 5%. 1000 milliLiter(s) (50 mL/Hr) IV Continuous <Continuous>  dextrose 5%. 1000 milliLiter(s) (100 mL/Hr) IV Continuous <Continuous>  dextrose 50% Injectable 25 Gram(s) IV Push once  dextrose 50% Injectable 12.5 Gram(s) IV Push once  dextrose 50% Injectable 25 Gram(s) IV Push once  diVALproex  milliGRAM(s) Oral two times a day  folic acid 1 milliGRAM(s) Oral daily  glucagon  Injectable 1 milliGRAM(s) IntraMuscular once  insulin glargine Injectable (LANTUS) 6 Unit(s) SubCutaneous at bedtime  insulin lispro (ADMELOG) corrective regimen sliding scale   SubCutaneous three times a day before meals  insulin lispro (ADMELOG) corrective regimen sliding scale   SubCutaneous at bedtime  levothyroxine 25 MICROGram(s) Oral daily  memantine 5 milliGRAM(s) Oral at bedtime  metoprolol tartrate 25 milliGRAM(s) Oral two times a day  midodrine 10 milliGRAM(s) Oral <User Schedule>  Nephro-celeste 1 Tablet(s) Oral daily  polyethylene glycol 3350 17 Gram(s) Oral two times a day  senna Syrup 10 milliLiter(s) Oral at bedtime  trihexyphenidyl 2 milliGRAM(s) Oral three times a day    MEDICATIONS  (PRN):  acetaminophen     Tablet .. 650 milliGRAM(s) Oral every 6 hours PRN Mild Pain (1 - 3)  albuterol/ipratropium for Nebulization 3 milliLiter(s) Nebulizer every 6 hours PRN Shortness of Breath and/or Wheezing  diVALproex  milliGRAM(s) Oral every 8 hours PRN agitation  guaiFENesin Oral Liquid (Sugar-Free) 200 milliGRAM(s) Oral every 6 hours PRN Cough  sodium chloride 0.9% lock flush 10 milliLiter(s) IV Push every 1 hour PRN Pre/post blood products, medications, blood draw, and to maintain line patency      Care Discussed with Consultants/Other Providers [x] YES  [ ] NO    HEALTH ISSUES - PROBLEM Dx:  Acute heart failure    Atrial flutter    DM2 (diabetes mellitus, type 2)    CAD (coronary artery disease)    Parkinsons disease    Acute on chronic renal failure    NSTEMI (non-ST elevation myocardial infarction)    Hypertension    Acute kidney injury superimposed on CKD    Anemia    Hypothyroidism    Delirium    Advanced care planning/counseling discussion    Palliative care status    Palliative care encounter    Pain    Stage 5 chronic kidney disease not on chronic dialysis         Patient is a 71y old  Male who presents with a chief complaint of leg swelling (24 Nov 2021 14:46)      SUBJECTIVE / OVERNIGHT EVENTS:    Patient seen and examined. behavior more calm today.       Vital Signs Last 24 Hrs  T(C): 36.8 (25 Nov 2021 05:11), Max: 37 (24 Nov 2021 22:44)  T(F): 98.3 (25 Nov 2021 05:11), Max: 98.6 (24 Nov 2021 22:44)  HR: 94 (25 Nov 2021 05:11) (70 - 99)  BP: 149/71 (25 Nov 2021 05:11) (120/75 - 150/80)  BP(mean): --  RR: 18 (25 Nov 2021 05:11) (17 - 18)  SpO2: 100% (25 Nov 2021 05:11) (92% - 100%)  I&O's Summary    24 Nov 2021 07:01  -  25 Nov 2021 07:00  --------------------------------------------------------  IN: 400 mL / OUT: 1600 mL / NET: -1200 mL        PE:  GENERAL:  AAOx1-2  HEAD:  Atraumatic, Normocephalic  ABDOMEN: Soft, Nontender  EXTREMITIES:  2+ Peripheral Pulses, No  edema, left AVF staples no erythema  NEURO: confused    LABS:                        9.3    9.15  )-----------( 282      ( 24 Nov 2021 06:13 )             31.0     11-24    130<L>  |  94<L>  |  58<H>  ----------------------------<  180<H>  4.2   |  22  |  5.77<H>    Ca    9.6      24 Nov 2021 06:13  Phos  4.5     11-24        CAPILLARY BLOOD GLUCOSE      POCT Blood Glucose.: 173 mg/dL (25 Nov 2021 08:15)  POCT Blood Glucose.: 198 mg/dL (24 Nov 2021 22:35)  POCT Blood Glucose.: 198 mg/dL (24 Nov 2021 17:06)  POCT Blood Glucose.: 213 mg/dL (24 Nov 2021 11:38)            RADIOLOGY & ADDITIONAL TESTS:    Imaging Personally Reviewed:  [x] YES  [ ] NO    Consultant(s) Notes Reviewed:  [x] YES  [ ] NO    MEDICATIONS  (STANDING):  atorvastatin 80 milliGRAM(s) Oral at bedtime  carbidopa/levodopa  25/100 2 Tablet(s) Oral <User Schedule>  carbidopa/levodopa  25/100 2.5 Tablet(s) Oral <User Schedule>  chlorhexidine 4% Liquid 1 Application(s) Topical <User Schedule>  cholecalciferol 1000 Unit(s) Oral daily  dextrose 5%. 1000 milliLiter(s) (50 mL/Hr) IV Continuous <Continuous>  dextrose 5%. 1000 milliLiter(s) (100 mL/Hr) IV Continuous <Continuous>  dextrose 50% Injectable 25 Gram(s) IV Push once  dextrose 50% Injectable 12.5 Gram(s) IV Push once  dextrose 50% Injectable 25 Gram(s) IV Push once  diVALproex  milliGRAM(s) Oral two times a day  folic acid 1 milliGRAM(s) Oral daily  glucagon  Injectable 1 milliGRAM(s) IntraMuscular once  insulin glargine Injectable (LANTUS) 6 Unit(s) SubCutaneous at bedtime  insulin lispro (ADMELOG) corrective regimen sliding scale   SubCutaneous three times a day before meals  insulin lispro (ADMELOG) corrective regimen sliding scale   SubCutaneous at bedtime  levothyroxine 25 MICROGram(s) Oral daily  memantine 5 milliGRAM(s) Oral at bedtime  metoprolol tartrate 25 milliGRAM(s) Oral two times a day  midodrine 10 milliGRAM(s) Oral <User Schedule>  Nephro-celeste 1 Tablet(s) Oral daily  polyethylene glycol 3350 17 Gram(s) Oral two times a day  senna Syrup 10 milliLiter(s) Oral at bedtime  trihexyphenidyl 2 milliGRAM(s) Oral three times a day    MEDICATIONS  (PRN):  acetaminophen     Tablet .. 650 milliGRAM(s) Oral every 6 hours PRN Mild Pain (1 - 3)  albuterol/ipratropium for Nebulization 3 milliLiter(s) Nebulizer every 6 hours PRN Shortness of Breath and/or Wheezing  diVALproex  milliGRAM(s) Oral every 8 hours PRN agitation  guaiFENesin Oral Liquid (Sugar-Free) 200 milliGRAM(s) Oral every 6 hours PRN Cough  sodium chloride 0.9% lock flush 10 milliLiter(s) IV Push every 1 hour PRN Pre/post blood products, medications, blood draw, and to maintain line patency      Care Discussed with Consultants/Other Providers [x] YES  [ ] NO    HEALTH ISSUES - PROBLEM Dx:  Acute heart failure    Atrial flutter    DM2 (diabetes mellitus, type 2)    CAD (coronary artery disease)    Parkinsons disease    Acute on chronic renal failure    NSTEMI (non-ST elevation myocardial infarction)    Hypertension    Acute kidney injury superimposed on CKD    Anemia    Hypothyroidism    Delirium    Advanced care planning/counseling discussion    Palliative care status    Palliative care encounter    Pain    Stage 5 chronic kidney disease not on chronic dialysis

## 2021-11-25 NOTE — PROGRESS NOTE ADULT - ASSESSMENT
Assessment  DMT2: 71y Male with DM T2 with hyperglycemia, A1C 6.4%, was on insulin at home, now on low-dose basal insulin and coverage, blood sugars are fluctuating within overall acceptable range, no hypoglycemic episodes.  Patient is eating partial meals, appears comfortable bu confused.  Hypothyroidism: Patient has no history thyroid disease, was not on any thyroid supplements, subclinical with low-normal FT4, lethargic, started on synthroid 25 mcg po daily, FT4 improved to 1.2.  CHF: on medications, stable, monitored.  HTN: Controlled,  on antihypertensive medications.  Parkinsons: on meds, monitored.  CKD: Monitor labs/BMP      Kelsie Reece MD  Cell: [phone]  Office: [phone]

## 2021-11-25 NOTE — PROGRESS NOTE ADULT - SUBJECTIVE AND OBJECTIVE BOX
Patient seen and examined  No complaints   Forgetful  s/p HD yesterday     REVIEW OF SYSTEMS:  Limited     MEDICATIONS  (STANDING):  atorvastatin 80 milliGRAM(s) Oral at bedtime  carbidopa/levodopa  25/100 2 Tablet(s) Oral <User Schedule>  carbidopa/levodopa  25/100 2.5 Tablet(s) Oral <User Schedule>  chlorhexidine 4% Liquid 1 Application(s) Topical <User Schedule>  cholecalciferol 1000 Unit(s) Oral daily  dextrose 5%. 1000 milliLiter(s) (50 mL/Hr) IV Continuous <Continuous>  dextrose 5%. 1000 milliLiter(s) (100 mL/Hr) IV Continuous <Continuous>  dextrose 50% Injectable 25 Gram(s) IV Push once  dextrose 50% Injectable 12.5 Gram(s) IV Push once  dextrose 50% Injectable 25 Gram(s) IV Push once  diVALproex  milliGRAM(s) Oral two times a day  folic acid 1 milliGRAM(s) Oral daily  glucagon  Injectable 1 milliGRAM(s) IntraMuscular once  insulin glargine Injectable (LANTUS) 6 Unit(s) SubCutaneous at bedtime  insulin lispro (ADMELOG) corrective regimen sliding scale   SubCutaneous three times a day before meals  insulin lispro (ADMELOG) corrective regimen sliding scale   SubCutaneous at bedtime  levothyroxine 25 MICROGram(s) Oral daily  memantine 5 milliGRAM(s) Oral at bedtime  metoprolol tartrate 25 milliGRAM(s) Oral two times a day  midodrine 10 milliGRAM(s) Oral <User Schedule>  Nephro-celeste 1 Tablet(s) Oral daily  polyethylene glycol 3350 17 Gram(s) Oral two times a day  senna Syrup 10 milliLiter(s) Oral at bedtime  trihexyphenidyl 2 milliGRAM(s) Oral three times a day      VITAL:  T(C): , Max: 37 (11-24-21 @ 22:44)  T(F): , Max: 98.6 (11-24-21 @ 22:44)  HR: 95 (11-25-21 @ 11:18)  BP: 117/73 (11-25-21 @ 11:18)  BP(mean): --  RR: 18 (11-25-21 @ 11:18)  SpO2: 100% (11-25-21 @ 11:18)  Wt(kg): --    I and O's:    11-24 @ 07:01  -  11-25 @ 07:00  --------------------------------------------------------  IN: 400 mL / OUT: 1600 mL / NET: -1200 mL          PHYSICAL EXAM:    Constitutional: NAD  Neck:  No JVD  Respiratory: CTAB/L  Cardiovascular: S1 and S2  Gastrointestinal: BS+, soft, NT/ND  Extremities: No peripheral edema  Neurological:  no focal deficits  Psychiatric:    : No Javier  Skin: No rashes  Access: avg and perm cath    LABS:                        9.3    9.15  )-----------( 282      ( 24 Nov 2021 06:13 )             31.0     11-24    130<L>  |  94<L>  |  58<H>  ----------------------------<  180<H>  4.2   |  22  |  5.77<H>    Ca    9.6      24 Nov 2021 06:13  Phos  4.5     11-24            Assessment and Plan:   · Assessment	  72yo M w/ PMHx of CAD (s/p CABG 2019), CKD (unknown stage), DM2, Parkinson's Disease, HTN, depression presents with bilateral leg swelling  ESRD   Severe LV dysfunction ;  A flutter   Confusion -  at present     1 IR-  Perm cath was evaluated by IR and he is back on restraints     2 Renal-  HD tomorrow     3 CVS- BP controlled at present, on Midodrine (with hold parameters in place, SBP> 160),  Lopressor for heart rate control     4 Anemia -  Retacrit 10k units at HD     5 Vasc - s/p  LUE AVF  with intact staples(vasc to fu re dc the rock)      Belle Jovel MD  [facility]- Nephrology  Cell: [phone]

## 2021-11-25 NOTE — PROGRESS NOTE ADULT - SUBJECTIVE AND OBJECTIVE BOX
Chief complaint    Patient is a 71y old  Male who presents with a chief complaint of leg swelling (25 Nov 2021 10:50)   Review of systems  Patient in bed, appears comfortable.    Labs and Fingersticks  CAPILLARY BLOOD GLUCOSE      POCT Blood Glucose.: 173 mg/dL (25 Nov 2021 08:15)  POCT Blood Glucose.: 198 mg/dL (24 Nov 2021 22:35)  POCT Blood Glucose.: 198 mg/dL (24 Nov 2021 17:06)      Anion Gap, Serum: 14 (11-24 @ 06:13)      Calcium, Total Serum: 9.6 (11-24 @ 06:13)          11-24    130<L>  |  94<L>  |  58<H>  ----------------------------<  180<H>  4.2   |  22  |  5.77<H>    Ca    9.6      24 Nov 2021 06:13  Phos  4.5     11-24                          9.3    9.15  )-----------( 282      ( 24 Nov 2021 06:13 )             31.0     Medications  MEDICATIONS  (STANDING):  atorvastatin 80 milliGRAM(s) Oral at bedtime  carbidopa/levodopa  25/100 2 Tablet(s) Oral <User Schedule>  carbidopa/levodopa  25/100 2.5 Tablet(s) Oral <User Schedule>  chlorhexidine 4% Liquid 1 Application(s) Topical <User Schedule>  cholecalciferol 1000 Unit(s) Oral daily  dextrose 5%. 1000 milliLiter(s) (50 mL/Hr) IV Continuous <Continuous>  dextrose 5%. 1000 milliLiter(s) (100 mL/Hr) IV Continuous <Continuous>  dextrose 50% Injectable 25 Gram(s) IV Push once  dextrose 50% Injectable 25 Gram(s) IV Push once  dextrose 50% Injectable 12.5 Gram(s) IV Push once  diVALproex  milliGRAM(s) Oral two times a day  folic acid 1 milliGRAM(s) Oral daily  glucagon  Injectable 1 milliGRAM(s) IntraMuscular once  insulin glargine Injectable (LANTUS) 6 Unit(s) SubCutaneous at bedtime  insulin lispro (ADMELOG) corrective regimen sliding scale   SubCutaneous three times a day before meals  insulin lispro (ADMELOG) corrective regimen sliding scale   SubCutaneous at bedtime  levothyroxine 25 MICROGram(s) Oral daily  memantine 5 milliGRAM(s) Oral at bedtime  metoprolol tartrate 25 milliGRAM(s) Oral two times a day  midodrine 10 milliGRAM(s) Oral <User Schedule>  Nephro-celeste 1 Tablet(s) Oral daily  polyethylene glycol 3350 17 Gram(s) Oral two times a day  senna Syrup 10 milliLiter(s) Oral at bedtime  trihexyphenidyl 2 milliGRAM(s) Oral three times a day      Physical Exam  General: Patient comfortable in bed  Vital Signs Last 12 Hrs  T(F): 98.4 (11-25-21 @ 11:18), Max: 98.4 (11-25-21 @ 11:18)  HR: 95 (11-25-21 @ 11:18) (94 - 95)  BP: 117/73 (11-25-21 @ 11:18) (117/73 - 149/71)  BP(mean): --  RR: 18 (11-25-21 @ 11:18) (18 - 18)  SpO2: 100% (11-25-21 @ 11:18) (100% - 100%)  Neck: No palpable thyroid nodules.  CVS: S1S2, No murmurs  Respiratory: No wheezing, no crepitations  GI: Abdomen soft, bowel sounds positive  Musculoskeletal:  edema lower extremities.     Diagnostics

## 2021-11-25 NOTE — PROGRESS NOTE ADULT - SUBJECTIVE AND OBJECTIVE BOX
[facility]      Seen earlier today, and examined.  - Today, patient is without complaints.           *****MEDICATIONS: Current medication reviewed and documented.    MEDICATIONS  (STANDING):  atorvastatin 80 milliGRAM(s) Oral at bedtime  carbidopa/levodopa  25/100 2 Tablet(s) Oral <User Schedule>  carbidopa/levodopa  25/100 2.5 Tablet(s) Oral <User Schedule>  chlorhexidine 4% Liquid 1 Application(s) Topical <User Schedule>  cholecalciferol 1000 Unit(s) Oral daily  dextrose 5%. 1000 milliLiter(s) (50 mL/Hr) IV Continuous <Continuous>  dextrose 5%. 1000 milliLiter(s) (100 mL/Hr) IV Continuous <Continuous>  dextrose 50% Injectable 25 Gram(s) IV Push once  dextrose 50% Injectable 12.5 Gram(s) IV Push once  dextrose 50% Injectable 25 Gram(s) IV Push once  diVALproex  milliGRAM(s) Oral two times a day  folic acid 1 milliGRAM(s) Oral daily  glucagon  Injectable 1 milliGRAM(s) IntraMuscular once  insulin glargine Injectable (LANTUS) 6 Unit(s) SubCutaneous at bedtime  insulin lispro (ADMELOG) corrective regimen sliding scale   SubCutaneous three times a day before meals  insulin lispro (ADMELOG) corrective regimen sliding scale   SubCutaneous at bedtime  levothyroxine 25 MICROGram(s) Oral daily  memantine 5 milliGRAM(s) Oral at bedtime  metoprolol tartrate 25 milliGRAM(s) Oral two times a day  midodrine 10 milliGRAM(s) Oral <User Schedule>  Nephro-celeste 1 Tablet(s) Oral daily  polyethylene glycol 3350 17 Gram(s) Oral two times a day  senna Syrup 10 milliLiter(s) Oral at bedtime  trihexyphenidyl 2 milliGRAM(s) Oral three times a day    MEDICATIONS  (PRN):  acetaminophen     Tablet .. 650 milliGRAM(s) Oral every 6 hours PRN Mild Pain (1 - 3)  albuterol/ipratropium for Nebulization 3 milliLiter(s) Nebulizer every 6 hours PRN Shortness of Breath and/or Wheezing  diVALproex  milliGRAM(s) Oral every 8 hours PRN agitation  guaiFENesin Oral Liquid (Sugar-Free) 200 milliGRAM(s) Oral every 6 hours PRN Cough  sodium chloride 0.9% lock flush 10 milliLiter(s) IV Push every 1 hour PRN Pre/post blood products, medications, blood draw, and to maintain line patency          ***** VITAL SIGNS:  T(F): 98.3 (21 @ 05:11), Max: 98.6 (21 @ 22:44)  HR: 94 (21 @ 05:11) (70 - 99)  BP: 149/71 (21 @ 05:11) (120/75 - 150/80)  RR: 18 (21 @ 05:11) (17 - 18)  SpO2: 100% (21 @ 05:11) (92% - 100%)  Wt(kg): --  ,   I&O's Summary    2021 07:01  -  2021 07:00  --------------------------------------------------------  IN: 400 mL / OUT: 1600 mL / NET: -1200 mL             *****PHYSICAL EXAM:  alert oriented x 2 attention comprehension are limited as pt is not cooperative   remains with limited insight   Able to name, repeat.   EOmi fundi not visualized    Tongue is midline     Moving all 4 ext spontaneously no drift appreciated    Gait not assessed.            *****LAB AND IMAGIN.3    9.15  )-----------( 282      ( 2021 06:13 )             31.0                   130<L>  |  94<L>  |  58<H>  ----------------------------<  180<H>  4.2   |  22  |  5.77<H>    Ca    9.6      2021 06:13  Phos  4.5     -                           [All pertinent recent Imaging/Reports reviewed]           *****A S S E S S M E N T   A N D   P L A N :        72yo M w/ PMHx of CAD (s/p CABG ), CKD (unknown stage), DM2, Parkinson's Disease, HTN, depression presents with bilateral leg swelling, patient's daughter in-law at bedside Elsy Quintero (4 [facility] Nurse) provides the history, the daughter reports the patient is a poor historian, unable to remember having conversations with others and likely cannot weigh risk/benefits of medical conditions, she reports that he has had bilateral lower extremity swelling for the past few months, but over the past 2 weeks it has significantly worsened, he has further difficulty ambulating due to swelling, his outpatient provider attempted to manage with 20mg lasix and then increased to 40mg lasix but the patient never took the increased dose, his symptoms ar persistent throughout the day and are extremely severe, he has developed wounds of the legs with weeping of fluid due to the swelling, she reports that the patient is frequently non-compliant with medications and medical management, he lives at home with his son and daughter in law, he denies chest pain, shortness of breath, fever/chills, cough, sputum, in the ED, he was tachycardic but hemodynamically stable, afebrile, saturating well on room air, labs were significant for elevated BNP, elevated creatinine, imaging showed moderate left pleural effusion, patient was admitted to general medicine for further management          Problem/Recommendations 1:ams of unclear etiology   likely related to multiple metabolic derangements related to uremia  sleep wake cycle disruption and poor nutritional intake  would regulate sleep wake cycle  check b12/b1 folate/tsh /ammonia wnl   thiamine 500 iv q 8 x 2 days after checking vitamin b1 level   nutrition consult for severe protein caloric malnutrition   discussed with elsy( daughter in law), agreeing to start namenda 5 mg qhs probable early lewy body dementia related paranoia and agitation   plan likely needs to adjust dose of antipsychotics, however avoid typical antipsychotics  elsy denies any alcohol intake at all.     lowered seroquel 12.5 due to sedation    over night pt did not sleep   now fell asleep as per aide at 7.15   regulate sleep wake cycle   dc am dose of seroquel   increase night time dose to regulate sleep wake cycle        more awake   conversant.   still somewhat agitated  does follow simple commands   pt able to arouse, converse         Problem/Recommendations 2: weakness   r/o orthostatic hypotension   pt shaking on standing up    prob deconditioning   pt eval   oob to chair as tolerated        Problem/Recommendations 3: parkinsons   continue current regimen   sinemet 2. 5 twice a day and 2 mg qh      Thank you for allowing me to participate in the care of this patient. Will continue to follow patient periodically. Please do not hesitate to call me if you have any  questions or if there has been a change in patients neurological status     ________________  Lily Fang MD  [facility] [place] [facility] ([facility]  [phone]      33 minutes spent on total encounter; more than 50 % of the visit was  spent counseling about plan of care, compliance to diet/exercise and medication regimen and or  coordinating care by the attending physician.      It is advised that stroke patients follow up with ZACH Albarran @ [phone] in 1- 2 weeks.   Others please follow up with Dr. Michael Nissenbaum [phone]

## 2021-11-25 NOTE — PROGRESS NOTE ADULT - ASSESSMENT
72yo M w/ PMHx of CAD (s/p CABG 2019), CKD (unknown stage), DM2, Parkinson's Disease, HTN, depression presents with new onset acute heart failure exacerbation, NSTEMI, started on hep gtt, developed bl RP bleed, acute anemia. sp MICU course for hemorrhagic shock, 10/28 sp IR embolization l4 and l5 lumbar artery. for permacath and AVF.    # Acute systolic and diastolic heart failure, improved  # NSTEMI- conservative mgmt dt renal function and anemia-bld loss  # ESRD, new HD  # Atrial flutter  # acute hypoxic resp failure, improved  # hemorrhagic shock, left RP hematoma, acute blood loss anemia  # lupus anticoagulant +  Echo TTE mod to severe LV SD, hypokinesis anterior wall, not candidate for cath, medically manage  HD per renal  10/28 sp IR embolization l4 and l5 lumbar artery, h/h stable  on midodrine during dialysis  sp permacath placement  sp L AVF  new left UE swelling, hematoma noted on doppler US, ace wrapping    # Parkinsons disease   # delirium  currently cannot discharge 2/2 behavior and aggitation  risk of pulling out permacath  remains on restraints  psych following, seroquel stopped for prolonged QTC  on depakote 250mg BID, 125mg q8 PRN, ativan only for severe aggitation  c/w trihexyphenidyl, carbidopa/levodopa     # DM2 (diabetes mellitus, type 2)  per endo  hba1c 6.4    # CAD (coronary artery disease)  # HTN  cont lopressor  c/w atorvastatin  asa on hold    PCP Dr. Simons    DNR/DNI    dispo: dc planning to rehab has been difficult 2/2 behaviour control and aggitation    please call [facility] @ [phone] for questions or concerns 72yo M w/ PMHx of CAD (s/p CABG 2019), CKD (unknown stage), DM2, Parkinson's Disease, HTN, depression presents with new onset acute heart failure exacerbation, NSTEMI, started on hep gtt, developed bl RP bleed, acute anemia. sp MICU course for hemorrhagic shock, 10/28 sp IR embolization l4 and l5 lumbar artery. for permacath and AVF.    # Acute systolic and diastolic heart failure, improved  # NSTEMI- conservative mgmt dt renal function and anemia-bld loss  # ESRD, new HD  # Atrial flutter  # acute hypoxic resp failure, improved  # hemorrhagic shock, left RP hematoma, acute blood loss anemia  # lupus anticoagulant +  Echo TTE mod to severe LV SD, hypokinesis anterior wall, not candidate for cath, medically manage  HD per renal  10/28 sp IR embolization l4 and l5 lumbar artery, h/h stable  on midodrine during dialysis  sp permacath placement  sp L AVF - staples in - vascular fu    # Parkinsons disease   # delirium  currently cannot discharge 2/2 behavior and agitation  risk of pulling out permacath  psych following, Seroquel stopped for prolonged QTC  on depakote 250mg BID, 125mg q8 PRN, ativan only for severe agitation  c/w trihexyphenidyl, carbidopa/levodopa     # DM2 (diabetes mellitus, type 2)  per endo  hba1c 6.4    # CAD (coronary artery disease)  # HTN  cont lopressor  c/w atorvastatin  asa on hold    PCP Dr. Simons    DNR/DNI    dispo: dc planning to rehab - has been difficult 2/2 behaviour control and agitation - improved today cont to monitor    please call [facility] @ [phone] for questions or concerns

## 2021-11-26 NOTE — PROGRESS NOTE ADULT - ASSESSMENT
Assessment  DMT2: 71y Male with DM T2 with hyperglycemia, A1C 6.4%, was on insulin at home, now on low-dose basal insulin and coverage, blood sugars are trending up/running slightly high and not at target, no hypoglycemic episodes. Patient is eating partial meals, confused, intermittently agitated.  Hypothyroidism: Patient has no history thyroid disease, was not on any thyroid supplements, subclinical with low-normal FT4, lethargic, started on synthroid 25 mcg po daily, FT4 improved to 1.2.  CHF: on medications, stable, monitored.  HTN: Controlled,  on antihypertensive medications.  Parkinsons: on meds, monitored.  CKD: Monitor labs/BMP      Kelsie Reece MD  Cell: [phone]  Office: [phone]     Assessment  DMT2: 71y Male with DM T2 with hyperglycemia, A1C 6.4%, was on insulin at home, now on low-dose basal insulin and coverage, blood sugars are trending up/running slightly high and not at target, no hypoglycemic episodes. Patient is eating partial meals,  confused, intermittently agitated.  Hypothyroidism: Patient has no history thyroid disease, was not on any thyroid supplements, subclinical with low-normal FT4, lethargic, started on synthroid 25 mcg po daily, FT4 improved to 1.2.  CHF: on medications, stable, monitored.  HTN: Controlled,  on antihypertensive medications.  Parkinsons: on meds, monitored.  CKD: Monitor labs/BMP      Kelsie Reece MD  Cell: [phone]  Office: [phone]

## 2021-11-26 NOTE — PROGRESS NOTE ADULT - SUBJECTIVE AND OBJECTIVE BOX
Patient is a 71y old  Male who presents with a chief complaint of leg swelling (25 Nov 2021 12:51)      SUBJECTIVE / OVERNIGHT EVENTS:    Patient seen and examined.   overnight, aggitated, pulling at mittens.    Vital Signs Last 24 Hrs  T(C): 37 (26 Nov 2021 05:02), Max: 37 (26 Nov 2021 05:02)  T(F): 98.6 (26 Nov 2021 05:02), Max: 98.6 (26 Nov 2021 05:02)  HR: 98 (26 Nov 2021 05:02) (95 - 108)  BP: 124/70 (26 Nov 2021 05:02) (117/73 - 127/85)  BP(mean): --  RR: 18 (26 Nov 2021 05:02) (18 - 20)  SpO2: 99% (26 Nov 2021 05:02) (99% - 100%)  I&O's Summary    25 Nov 2021 07:01  -  26 Nov 2021 07:00  --------------------------------------------------------  IN: 360 mL / OUT: 0 mL / NET: 360 mL        PE:  GENERAL:  AAOx1-2  HEAD:  Atraumatic, Normocephalic  ABDOMEN: Soft, Nontender  EXTREMITIES:  2+ Peripheral Pulses, No  edema, left AVF staples no erythema  NEURO: confused    LABS:                        9.1    13.37 )-----------( 269      ( 26 Nov 2021 05:37 )             30.5     11-26    133<L>  |  94<L>  |  51<H>  ----------------------------<  205<H>  3.9   |  23  |  5.77<H>    Ca    9.7      26 Nov 2021 05:37    TPro  8.5<H>  /  Alb  3.0<L>  /  TBili  0.7  /  DBili  x   /  AST  30  /  ALT  <5<L>  /  AlkPhos  108  11-26      CAPILLARY BLOOD GLUCOSE      POCT Blood Glucose.: 182 mg/dL (26 Nov 2021 08:07)  POCT Blood Glucose.: 247 mg/dL (25 Nov 2021 21:21)  POCT Blood Glucose.: 202 mg/dL (25 Nov 2021 17:10)  POCT Blood Glucose.: 250 mg/dL (25 Nov 2021 12:18)            RADIOLOGY & ADDITIONAL TESTS:    Imaging Personally Reviewed:  [x] YES  [ ] NO    Consultant(s) Notes Reviewed:  [x] YES  [ ] NO    MEDICATIONS  (STANDING):  atorvastatin 80 milliGRAM(s) Oral at bedtime  carbidopa/levodopa  25/100 2.5 Tablet(s) Oral <User Schedule>  carbidopa/levodopa  25/100 2 Tablet(s) Oral <User Schedule>  chlorhexidine 4% Liquid 1 Application(s) Topical <User Schedule>  cholecalciferol 1000 Unit(s) Oral daily  dextrose 5%. 1000 milliLiter(s) (50 mL/Hr) IV Continuous <Continuous>  dextrose 5%. 1000 milliLiter(s) (100 mL/Hr) IV Continuous <Continuous>  dextrose 50% Injectable 25 Gram(s) IV Push once  dextrose 50% Injectable 12.5 Gram(s) IV Push once  dextrose 50% Injectable 25 Gram(s) IV Push once  diVALproex Sprinkle 250 milliGRAM(s) Oral two times a day  folic acid 1 milliGRAM(s) Oral daily  glucagon  Injectable 1 milliGRAM(s) IntraMuscular once  insulin glargine Injectable (LANTUS) 6 Unit(s) SubCutaneous at bedtime  insulin lispro (ADMELOG) corrective regimen sliding scale   SubCutaneous three times a day before meals  insulin lispro (ADMELOG) corrective regimen sliding scale   SubCutaneous at bedtime  levothyroxine 25 MICROGram(s) Oral daily  memantine 5 milliGRAM(s) Oral at bedtime  metoprolol tartrate 25 milliGRAM(s) Oral two times a day  midodrine 10 milliGRAM(s) Oral <User Schedule>  Nephro-celeste 1 Tablet(s) Oral daily  polyethylene glycol 3350 17 Gram(s) Oral two times a day  senna Syrup 10 milliLiter(s) Oral at bedtime  trihexyphenidyl 2 milliGRAM(s) Oral three times a day    MEDICATIONS  (PRN):  acetaminophen     Tablet .. 650 milliGRAM(s) Oral every 6 hours PRN Mild Pain (1 - 3)  albuterol/ipratropium for Nebulization 3 milliLiter(s) Nebulizer every 6 hours PRN Shortness of Breath and/or Wheezing  diVALproex Sprinkle 125 milliGRAM(s) Oral every 8 hours PRN Agitation  guaiFENesin Oral Liquid (Sugar-Free) 200 milliGRAM(s) Oral every 6 hours PRN Cough  sodium chloride 0.9% lock flush 10 milliLiter(s) IV Push every 1 hour PRN Pre/post blood products, medications, blood draw, and to maintain line patency      Care Discussed with Consultants/Other Providers [x] YES  [ ] NO    HEALTH ISSUES - PROBLEM Dx:  Acute heart failure    Atrial flutter    DM2 (diabetes mellitus, type 2)    CAD (coronary artery disease)    Parkinsons disease    Acute on chronic renal failure    NSTEMI (non-ST elevation myocardial infarction)    Hypertension    Acute kidney injury superimposed on CKD    Anemia    Hypothyroidism    Delirium    Advanced care planning/counseling discussion    Palliative care status    Palliative care encounter    Pain    Stage 5 chronic kidney disease not on chronic dialysis         Patient is a 71y old  Male who presents with a chief complaint of leg swelling (25 Nov 2021 12:51)      SUBJECTIVE / OVERNIGHT EVENTS:    Patient seen and examined.   overnight, aggitated, pulling at [employer]. today, upon my visit, calm, states there are about 400,000 bugs crawling. would like me to collect them and put them in a jar to look at.    Vital Signs Last 24 Hrs  T(C): 37 (26 Nov 2021 05:02), Max: 37 (26 Nov 2021 05:02)  T(F): 98.6 (26 Nov 2021 05:02), Max: 98.6 (26 Nov 2021 05:02)  HR: 98 (26 Nov 2021 05:02) (95 - 108)  BP: 124/70 (26 Nov 2021 05:02) (117/73 - 127/85)  BP(mean): --  RR: 18 (26 Nov 2021 05:02) (18 - 20)  SpO2: 99% (26 Nov 2021 05:02) (99% - 100%)  I&O's Summary    25 Nov 2021 07:01  -  26 Nov 2021 07:00  --------------------------------------------------------  IN: 360 mL / OUT: 0 mL / NET: 360 mL        PE:  GENERAL:  AAOx1-2  cardio: irregular  HEAD:  Atraumatic, Normocephalic  ABDOMEN: Soft, Nontender  EXTREMITIES:  2+ Peripheral Pulses, No  edema, left AVF staples no erythema  NEURO: confused    LABS:                        9.1    13.37 )-----------( 269      ( 26 Nov 2021 05:37 )             30.5     11-26    133<L>  |  94<L>  |  51<H>  ----------------------------<  205<H>  3.9   |  23  |  5.77<H>    Ca    9.7      26 Nov 2021 05:37    TPro  8.5<H>  /  Alb  3.0<L>  /  TBili  0.7  /  DBili  x   /  AST  30  /  ALT  <5<L>  /  AlkPhos  108  11-26      CAPILLARY BLOOD GLUCOSE      POCT Blood Glucose.: 182 mg/dL (26 Nov 2021 08:07)  POCT Blood Glucose.: 247 mg/dL (25 Nov 2021 21:21)  POCT Blood Glucose.: 202 mg/dL (25 Nov 2021 17:10)  POCT Blood Glucose.: 250 mg/dL (25 Nov 2021 12:18)            RADIOLOGY & ADDITIONAL TESTS:    Imaging Personally Reviewed:  [x] YES  [ ] NO    Consultant(s) Notes Reviewed:  [x] YES  [ ] NO    MEDICATIONS  (STANDING):  atorvastatin 80 milliGRAM(s) Oral at bedtime  carbidopa/levodopa  25/100 2.5 Tablet(s) Oral <User Schedule>  carbidopa/levodopa  25/100 2 Tablet(s) Oral <User Schedule>  chlorhexidine 4% Liquid 1 Application(s) Topical <User Schedule>  cholecalciferol 1000 Unit(s) Oral daily  dextrose 5%. 1000 milliLiter(s) (50 mL/Hr) IV Continuous <Continuous>  dextrose 5%. 1000 milliLiter(s) (100 mL/Hr) IV Continuous <Continuous>  dextrose 50% Injectable 25 Gram(s) IV Push once  dextrose 50% Injectable 12.5 Gram(s) IV Push once  dextrose 50% Injectable 25 Gram(s) IV Push once  diVALproex Sprinkle 250 milliGRAM(s) Oral two times a day  folic acid 1 milliGRAM(s) Oral daily  glucagon  Injectable 1 milliGRAM(s) IntraMuscular once  insulin glargine Injectable (LANTUS) 6 Unit(s) SubCutaneous at bedtime  insulin lispro (ADMELOG) corrective regimen sliding scale   SubCutaneous three times a day before meals  insulin lispro (ADMELOG) corrective regimen sliding scale   SubCutaneous at bedtime  levothyroxine 25 MICROGram(s) Oral daily  memantine 5 milliGRAM(s) Oral at bedtime  metoprolol tartrate 25 milliGRAM(s) Oral two times a day  midodrine 10 milliGRAM(s) Oral <User Schedule>  Nephro-celeste 1 Tablet(s) Oral daily  polyethylene glycol 3350 17 Gram(s) Oral two times a day  senna Syrup 10 milliLiter(s) Oral at bedtime  trihexyphenidyl 2 milliGRAM(s) Oral three times a day    MEDICATIONS  (PRN):  acetaminophen     Tablet .. 650 milliGRAM(s) Oral every 6 hours PRN Mild Pain (1 - 3)  albuterol/ipratropium for Nebulization 3 milliLiter(s) Nebulizer every 6 hours PRN Shortness of Breath and/or Wheezing  diVALproex Sprinkle 125 milliGRAM(s) Oral every 8 hours PRN Agitation  guaiFENesin Oral Liquid (Sugar-Free) 200 milliGRAM(s) Oral every 6 hours PRN Cough  sodium chloride 0.9% lock flush 10 milliLiter(s) IV Push every 1 hour PRN Pre/post blood products, medications, blood draw, and to maintain line patency      Care Discussed with Consultants/Other Providers [x] YES  [ ] NO    HEALTH ISSUES - PROBLEM Dx:  Acute heart failure    Atrial flutter    DM2 (diabetes mellitus, type 2)    CAD (coronary artery disease)    Parkinsons disease    Acute on chronic renal failure    NSTEMI (non-ST elevation myocardial infarction)    Hypertension    Acute kidney injury superimposed on CKD    Anemia    Hypothyroidism    Delirium    Advanced care planning/counseling discussion    Palliative care status    Palliative care encounter    Pain    Stage 5 chronic kidney disease not on chronic dialysis

## 2021-11-26 NOTE — PROGRESS NOTE ADULT - ASSESSMENT
ABBY now on HD  Acute on chronic systolic CHF with fluid overload  Bilateral L>>R pleural effusions  S/P RP bleed on AC   Atrial Flutter/Fib  AGitation-delirium    REC    DC planning primary team  HD per renal

## 2021-11-26 NOTE — PROGRESS NOTE ADULT - ASSESSMENT
70yo M w/ PMHx of CAD (s/p CABG 2019), CKD (unknown stage), DM2, Parkinson's Disease, HTN, depression presents with new onset acute heart failure exacerbation, NSTEMI, started on hep gtt, developed bl RP bleed, acute anemia. sp MICU course for hemorrhagic shock, 10/28 sp IR embolization l4 and l5 lumbar artery. for permacath and AVF.    # Acute systolic and diastolic heart failure, improved  # NSTEMI- conservative mgmt dt renal function and anemia-bld loss  # ESRD, new HD  # Atrial flutter  # acute hypoxic resp failure, improved  # hemorrhagic shock, left RP hematoma, acute blood loss anemia  # lupus anticoagulant +  Echo TTE mod to severe LV SD, hypokinesis anterior wall, not candidate for cath, medically manage  HD per renal  10/28 sp IR embolization l4 and l5 lumbar artery, h/h stable  on midodrine during dialysis  sp permacath placement  sp L AVF - vascular fu to remove staples    # Parkinsons disease   # delirium  currently cannot discharge 2/2 behavior and agitation, risk of pulling out permacath  Seroquel stopped for prolonged QTC  on depakote 250mg BID, 125mg q8 PRN, ativan only for severe agitation, psych fu  c/w trihexyphenidyl, carbidopa/levodopa     # DM2 (diabetes mellitus, type 2)  per endo  hba1c 6.4    # CAD (coronary artery disease)  # HTN  cont lopressor  c/w atorvastatin  asa on hold    PCP Dr. Simons    DNR/DNI    dispo: dc planning to rehab - has been difficult 2/2 behaviour control and agitation - psych fu    please call [facility] @ [phone] for questions or concerns 70yo M w/ PMHx of CAD (s/p CABG 2019), CKD (unknown stage), DM2, Parkinson's Disease, HTN, depression presents with new onset acute heart failure exacerbation, NSTEMI, started on hep gtt, developed bl RP bleed, acute anemia. sp MICU course for hemorrhagic shock, 10/28 sp IR embolization l4 and l5 lumbar artery. for permacath and AVF.    # Acute systolic and diastolic heart failure, improved  # NSTEMI- conservative mgmt dt renal function and anemia-bld loss  # ESRD, new HD  # Atrial flutter  # acute hypoxic resp failure, improved  # hemorrhagic shock, left RP hematoma, acute blood loss anemia  # lupus anticoagulant +  Echo TTE mod to severe LV SD, hypokinesis anterior wall, not candidate for cath, medically manage  HD per renal  10/28 sp IR embolization l4 and l5 lumbar artery, h/h stable  on midodrine during dialysis  sp permacath placement  sp L AVF - vascular fu to remove staples  minor uptrend in WBC, will cont to monitor    # Parkinsons disease   # delirium  currently cannot discharge 2/2 behavior and agitation, risk of pulling out permacath  Seroquel stopped for prolonged QTC  on depakote 250mg BID, 125mg q8 PRN, ativan only for severe agitation, psych fu  c/w trihexyphenidyl, carbidopa/levodopa     # DM2 (diabetes mellitus, type 2)  per endo  hba1c 6.4    # CAD (coronary artery disease)  # HTN  cont lopressor  c/w atorvastatin  asa on hold    PCP Dr. Simons    DNR/DNI    dispo: dc planning to rehab - has been difficult 2/2 behaviour control and agitation - psych fu    please call [facility] @ [phone] for questions or concerns

## 2021-11-26 NOTE — PROGRESS NOTE ADULT - SUBJECTIVE AND OBJECTIVE BOX
[facility] ([phone]        Patient seen and examined in bed.  He was the same         MEDICATIONS  (STANDING):  atorvastatin 80 milliGRAM(s) Oral at bedtime  carbidopa/levodopa  25/100 2.5 Tablet(s) Oral <User Schedule>  carbidopa/levodopa  25/100 2 Tablet(s) Oral <User Schedule>  chlorhexidine 4% Liquid 1 Application(s) Topical <User Schedule>  cholecalciferol 1000 Unit(s) Oral daily  dextrose 5%. 1000 milliLiter(s) (50 mL/Hr) IV Continuous <Continuous>  dextrose 5%. 1000 milliLiter(s) (100 mL/Hr) IV Continuous <Continuous>  dextrose 50% Injectable 25 Gram(s) IV Push once  dextrose 50% Injectable 12.5 Gram(s) IV Push once  dextrose 50% Injectable 25 Gram(s) IV Push once  diVALproex Sprinkle 250 milliGRAM(s) Oral two times a day  folic acid 1 milliGRAM(s) Oral daily  glucagon  Injectable 1 milliGRAM(s) IntraMuscular once  insulin glargine Injectable (LANTUS) 6 Unit(s) SubCutaneous at bedtime  insulin lispro (ADMELOG) corrective regimen sliding scale   SubCutaneous three times a day before meals  insulin lispro (ADMELOG) corrective regimen sliding scale   SubCutaneous at bedtime  levothyroxine 25 MICROGram(s) Oral daily  memantine 5 milliGRAM(s) Oral at bedtime  metoprolol tartrate 25 milliGRAM(s) Oral two times a day  midodrine 10 milliGRAM(s) Oral <User Schedule>  Nephro-celeste 1 Tablet(s) Oral daily  polyethylene glycol 3350 17 Gram(s) Oral two times a day  senna Syrup 10 milliLiter(s) Oral at bedtime  trihexyphenidyl 2 milliGRAM(s) Oral three times a day      VITAL:  T(C): , Max: 37 (11-26-21 @ 05:02)  T(F): , Max: 98.6 (11-26-21 @ 05:02)  HR: 98 (11-26-21 @ 05:02)  BP: 124/70 (11-26-21 @ 05:02)  BP(mean): --  RR: 18 (11-26-21 @ 05:02)  SpO2: 99% (11-26-21 @ 05:02)  Wt(kg): --    I and O's:    11-25 @ 07:01  -  11-26 @ 07:00  --------------------------------------------------------  IN: 360 mL / OUT: 0 mL / NET: 360 mL          PHYSICAL EXAM:    Constitutional: NAD  Neck:  No JVD  Respiratory: CTAB/L  Cardiovascular: S1 and S2  Gastrointestinal: BS+, soft, NT/ND  Extremities: No peripheral edema  Neurological: A/O x 3, no focal deficits  Psychiatric: Normal mood, normal affect  : No Javier  Skin: No rashes  Access: avg and perm     LABS:                        9.1    13.37 )-----------( 269      ( 26 Nov 2021 05:37 )             30.5     11-26    133<L>  |  94<L>  |  51<H>  ----------------------------<  205<H>  3.9   |  23  |  5.77<H>    Ca    9.7      26 Nov 2021 05:37    TPro  8.5<H>  /  Alb  3.0<L>  /  TBili  0.7  /  DBili  x   /  AST  30  /  ALT  <5<L>  /  AlkPhos  108  11-26          Urine Studies:          RADIOLOGY & ADDITIONAL STUDIES:

## 2021-11-26 NOTE — PROGRESS NOTE ADULT - SUBJECTIVE AND OBJECTIVE BOX
[facility]rological Care [facility]      Seen earlier today, and examined.  - Today, patient is without complaints.           *****MEDICATIONS: Current medication reviewed and documented.    MEDICATIONS  (STANDING):  atorvastatin 80 milliGRAM(s) Oral at bedtime  carbidopa/levodopa  25/100 2.5 Tablet(s) Oral <User Schedule>  carbidopa/levodopa  25/100 2 Tablet(s) Oral <User Schedule>  chlorhexidine 4% Liquid 1 Application(s) Topical <User Schedule>  cholecalciferol 1000 Unit(s) Oral daily  dextrose 5%. 1000 milliLiter(s) (50 mL/Hr) IV Continuous <Continuous>  dextrose 5%. 1000 milliLiter(s) (100 mL/Hr) IV Continuous <Continuous>  dextrose 50% Injectable 25 Gram(s) IV Push once  dextrose 50% Injectable 12.5 Gram(s) IV Push once  dextrose 50% Injectable 25 Gram(s) IV Push once  diVALproex Sprinkle 250 milliGRAM(s) Oral two times a day  folic acid 1 milliGRAM(s) Oral daily  glucagon  Injectable 1 milliGRAM(s) IntraMuscular once  insulin glargine Injectable (LANTUS) 8 Unit(s) SubCutaneous at bedtime  insulin lispro (ADMELOG) corrective regimen sliding scale   SubCutaneous three times a day before meals  insulin lispro (ADMELOG) corrective regimen sliding scale   SubCutaneous at bedtime  levothyroxine 25 MICROGram(s) Oral daily  memantine 5 milliGRAM(s) Oral at bedtime  metoprolol tartrate 25 milliGRAM(s) Oral two times a day  midodrine 10 milliGRAM(s) Oral <User Schedule>  Nephro-celeste 1 Tablet(s) Oral daily  polyethylene glycol 3350 17 Gram(s) Oral two times a day  senna Syrup 10 milliLiter(s) Oral at bedtime  trihexyphenidyl 2 milliGRAM(s) Oral three times a day    MEDICATIONS  (PRN):  acetaminophen     Tablet .. 650 milliGRAM(s) Oral every 6 hours PRN Mild Pain (1 - 3)  albuterol/ipratropium for Nebulization 3 milliLiter(s) Nebulizer every 6 hours PRN Shortness of Breath and/or Wheezing  diVALproex Sprinkle 125 milliGRAM(s) Oral every 8 hours PRN Agitation  guaiFENesin Oral Liquid (Sugar-Free) 200 milliGRAM(s) Oral every 6 hours PRN Cough  sodium chloride 0.9% lock flush 10 milliLiter(s) IV Push every 1 hour PRN Pre/post blood products, medications, blood draw, and to maintain line patency          ***** VITAL SIGNS:  T(F): 98.1 (21 @ 11:29), Max: 98.6 (21 @ 05:02)  HR: 65 (21 @ 11:29) (65 - 108)  BP: 135/69 (21 @ 11:29) (123/66 - 135/69)  RR: 18 (21 @ 11:29) (18 - 20)  SpO2: 98% (21 @ 11:29) (98% - 100%)  Wt(kg): --  ,   I&O's Summary    2021 07:  -  2021 07:00  --------------------------------------------------------  IN: 360 mL / OUT: 0 mL / NET: 360 mL    2021 07:  -  2021 14:26  --------------------------------------------------------  IN: 240 mL / OUT: 0 mL / NET: 240 mL             *****PHYSICAL EXAM:   alert oriented x 3 attention comprehension are fair.  Able to name, repeat.   EOmi fundi not visualized   no nystagmus VFF to confrontation  Tongue is midline  Palate elevates symmetrically   Moving all 4 ext spontaneously no drift appreciated    Gait not assessed.            *****LAB AND IMAGIN.1    13.37 )-----------( 269      ( 2021 05:37 )             30.5               11    133<L>  |  94<L>  |  51<H>  ----------------------------<  205<H>  3.9   |  23  |  5.77<H>    Ca    9.7      2021 05:37    TPro  8.5<H>  /  Alb  3.0<L>  /  TBili  0.7  /  DBili  x   /  AST  30  /  ALT  <5<L>  /  AlkPhos  108  -                         [All pertinent recent Imaging/Reports reviewed]           *****A S S E S S M E N T   A N D   P L A N :  70yo M w/ PMHx of CAD (s/p CABG ), CKD (unknown stage), DM2, Parkinson's Disease, HTN, depression presents with bilateral leg swelling, patient's daughter in-law at bedside Elsy Quintero ([address] [place] [address] Nurse) provides the history, the daughter reports the patient is a poor historian, unable to remember having conversations with others and likely cannot weigh risk/benefits of medical conditions, she reports that he has had bilateral lower extremity swelling for the past few months, but over the past 2 weeks it has significantly worsened, he has further difficulty ambulating due to swelling, his outpatient provider attempted to manage with 20mg lasix and then increased to 40mg lasix but the patient never took the increased dose, his symptoms ar persistent throughout the day and are extremely severe, he has developed wounds of the legs with weeping of fluid due to the swelling, she reports that the patient is frequently non-compliant with medications and medical management, he lives at home with his son and daughter in law, he denies chest pain, shortness of breath, fever/chills, cough, sputum, in the ED, he was tachycardic but hemodynamically stable, afebrile, saturating well on room air, labs were significant for elevated BNP, elevated creatinine, imaging showed moderate left pleural effusion, patient was admitted to general medicine for further management          Problem/Recommendations 1:ams of unclear etiology   likely related to multiple metabolic derangements related to uremia  sleep wake cycle disruption and poor nutritional intake  would regulate sleep wake cycle  check b12/b1 folate/tsh /ammonia wnl   thiamine 500 iv q 8 x 2 days after checking vitamin b1 level   nutrition consult for severe protein caloric malnutrition   discussed with elsy( daughter in law), agreeing to start namenda 5 mg qhs probable early lewy body dementia related paranoia and agitation   plan likely needs to adjust dose of antipsychotics, however avoid typical antipsychotics  elsy denies any alcohol intake at all.     lowered seroquel 12.5 due to sedation    over night pt did not sleep   now fell asleep as per aide at 7.15   regulate sleep wake cycle   dc am dose of seroquel   increase night time dose to regulate sleep wake cycle        more awake   conversant.   still somewhat agitated  does follow simple commands   pt able to arouse, converse    more awake, following commands   still with paranoia       Problem/Recommendations 2: weakness   r/o orthostatic hypotension   pt shaking on standing up    prob deconditioning   pt eval   oob to chair as tolerated        Problem/Recommendations 3: parkinsons   continue current regimen   sinemet 2. 5 twice a day and 2 mg qh        Thank you for allowing me to participate in the care of this patient. Will continue to follow patient periodically. Please do not hesitate to call me if you have any  questions or if there has been a change in patients neurological status     ________________  Lily Fang MD  [facility]rological [facility] ([facility]  [phone]      33 minutes spent on total encounter; more than 50 % of the visit was  spent counseling about plan of care, compliance to diet/exercise and medication regimen and or  coordinating care by the attending physician.      It is advised that stroke patients follow up with [facility] @ [phone] in 1- 2 weeks.   Others please follow up with Dr. Michael Nissenbaum [phone]

## 2021-11-26 NOTE — PROGRESS NOTE ADULT - SUBJECTIVE AND OBJECTIVE BOX
Follow-up Pulm Progress Note  Brayan Verma MD  [phone]    Breathing comforably on room air: sats in upper 90's  No new resp issues      Vital Signs Last 24 Hrs  T(C): 36.7 (26 Nov 2021 11:29), Max: 37 (26 Nov 2021 05:02)  T(F): 98.1 (26 Nov 2021 11:29), Max: 98.6 (26 Nov 2021 05:02)  HR: 65 (26 Nov 2021 11:29) (65 - 108)  BP: 135/69 (26 Nov 2021 11:29) (123/66 - 135/69)  BP(mean): --  RR: 18 (26 Nov 2021 11:29) (18 - 20)  SpO2: 98% (26 Nov 2021 11:29) (98% - 100%)                        9.1    13.37 )-----------( 269      ( 26 Nov 2021 05:37 )             30.5     11-26    133<L>  |  94<L>  |  51<H>  ----------------------------<  205<H>  3.9   |  23  |  5.77<H>    Ca    9.7      26 Nov 2021 05:37    TPro  8.5<H>  /  Alb  3.0<L>  /  TBili  0.7  /  DBili  x   /  AST  30  /  ALT  <5<L>  /  AlkPhos  108  11-26    Physical Examination:  PULM: Diminished basilar BS  CVS: Regular rate and rhythm, no murmurs, rubs, or gallops  ABD: Soft, non-tender  EXT:  No clubbing, cyanosis, or edema    RADIOLOGY REVIEWED  CXR:    CT chest:    TTE:

## 2021-11-26 NOTE — PROGRESS NOTE ADULT - SUBJECTIVE AND OBJECTIVE BOX
Chief complaint  Patient is a 71y old  Male who presents with a chief complaint of leg swelling (26 Nov 2021 10:03)   Review of systems  Patient in bed, looks comfortable, no hypoglycemic episodes.    Labs and Fingersticks  CAPILLARY BLOOD GLUCOSE      POCT Blood Glucose.: 171 mg/dL (26 Nov 2021 11:44)  POCT Blood Glucose.: 182 mg/dL (26 Nov 2021 08:07)  POCT Blood Glucose.: 247 mg/dL (25 Nov 2021 21:21)  POCT Blood Glucose.: 202 mg/dL (25 Nov 2021 17:10)      Anion Gap, Serum: 16 (11-26 @ 05:37)      Calcium, Total Serum: 9.7 (11-26 @ 05:37)  Albumin, Serum: 3.0 *L* (11-26 @ 05:37)    Alanine Aminotransferase (ALT/SGPT): <5 *L* (11-26 @ 05:37)  Alkaline Phosphatase, Serum: 108 (11-26 @ 05:37)  Aspartate Aminotransferase (AST/SGOT): 30 (11-26 @ 05:37)        11-26    133<L>  |  94<L>  |  51<H>  ----------------------------<  205<H>  3.9   |  23  |  5.77<H>    Ca    9.7      26 Nov 2021 05:37    TPro  8.5<H>  /  Alb  3.0<L>  /  TBili  0.7  /  DBili  x   /  AST  30  /  ALT  <5<L>  /  AlkPhos  108  11-26                        9.1    13.37 )-----------( 269      ( 26 Nov 2021 05:37 )             30.5     Medications  MEDICATIONS  (STANDING):  atorvastatin 80 milliGRAM(s) Oral at bedtime  carbidopa/levodopa  25/100 2.5 Tablet(s) Oral <User Schedule>  carbidopa/levodopa  25/100 2 Tablet(s) Oral <User Schedule>  chlorhexidine 4% Liquid 1 Application(s) Topical <User Schedule>  cholecalciferol 1000 Unit(s) Oral daily  dextrose 5%. 1000 milliLiter(s) (50 mL/Hr) IV Continuous <Continuous>  dextrose 5%. 1000 milliLiter(s) (100 mL/Hr) IV Continuous <Continuous>  dextrose 50% Injectable 25 Gram(s) IV Push once  dextrose 50% Injectable 12.5 Gram(s) IV Push once  dextrose 50% Injectable 25 Gram(s) IV Push once  diVALproex Sprinkle 250 milliGRAM(s) Oral two times a day  folic acid 1 milliGRAM(s) Oral daily  glucagon  Injectable 1 milliGRAM(s) IntraMuscular once  insulin glargine Injectable (LANTUS) 8 Unit(s) SubCutaneous at bedtime  insulin lispro (ADMELOG) corrective regimen sliding scale   SubCutaneous three times a day before meals  insulin lispro (ADMELOG) corrective regimen sliding scale   SubCutaneous at bedtime  levothyroxine 25 MICROGram(s) Oral daily  memantine 5 milliGRAM(s) Oral at bedtime  metoprolol tartrate 25 milliGRAM(s) Oral two times a day  midodrine 10 milliGRAM(s) Oral <User Schedule>  Nephro-celeste 1 Tablet(s) Oral daily  polyethylene glycol 3350 17 Gram(s) Oral two times a day  senna Syrup 10 milliLiter(s) Oral at bedtime  trihexyphenidyl 2 milliGRAM(s) Oral three times a day      Physical Exam  General: Patient comfortable in bed  Vital Signs Last 12 Hrs  T(F): 98.1 (11-26-21 @ 11:29), Max: 98.6 (11-26-21 @ 05:02)  HR: 65 (11-26-21 @ 11:29) (65 - 98)  BP: 135/69 (11-26-21 @ 11:29) (124/70 - 135/69)  BP(mean): --  RR: 18 (11-26-21 @ 11:29) (18 - 18)  SpO2: 98% (11-26-21 @ 11:29) (98% - 99%)  Neck: No palpable thyroid nodules.  CVS: S1S2, No murmurs  Respiratory: No wheezing, no crepitations  GI: Abdomen soft, bowel sounds positive  Musculoskeletal:  edema lower extremities.   Skin: No skin rashes, no ecchymosis    Diagnostics    Free Thyroxine, Serum: AM Sched. Collection: 06-Nov-2021 06:00 (11-05 @ 13:18)           Chief complaint  Patient is a 71y old  Male who presents with a chief complaint of leg swelling (26 Nov 2021 10:03)   Review of systems  Patient in bed, looks comfortable, no hypoglycemic episodes.    Labs and Fingersticks  CAPILLARY BLOOD GLUCOSE      POCT Blood Glucose.: 171 mg/dL (26 Nov 2021 11:44)  POCT Blood Glucose.: 182 mg/dL (26 Nov 2021 08:07)  POCT Blood Glucose.: 247 mg/dL (25 Nov 2021 21:21)  POCT Blood Glucose.: 202 mg/dL (25 Nov 2021 17:10)      Anion Gap, Serum: 16 (11-26 @ 05:37)      Calcium, Total Serum: 9.7 (11-26 @ 05:37)  Albumin, Serum: 3.0 *L* (11-26 @ 05:37)    Alanine Aminotransferase (ALT/SGPT): <5 *L* (11-26 @ 05:37)  Alkaline Phosphatase, Serum: 108 (11-26 @ 05:37)  Aspartate Aminotransferase (AST/SGOT): 30 (11-26 @ 05:37)        11-26    133<L>  |  94<L>  |  51<H>  ----------------------------<  205<H>  3.9   |  23  |  5.77<H>    Ca    9.7      26 Nov 2021 05:37    TPro  8.5<H>  /  Alb  3.0<L>  /  TBili  0.7  /  DBili  x   /  AST  30  /  ALT  <5<L>  /  AlkPhos  108  11-26                        9.1    13.37 )-----------( 269      ( 26 Nov 2021 05:37 )             30.5     Medications  MEDICATIONS  (STANDING):  atorvastatin 80 milliGRAM(s) Oral at bedtime  carbidopa/levodopa  25/100 2.5 Tablet(s) Oral <User Schedule>  carbidopa/levodopa  25/100 2 Tablet(s) Oral <User Schedule>  chlorhexidine 4% Liquid 1 Application(s) Topical <User Schedule>  cholecalciferol 1000 Unit(s) Oral daily  dextrose 5%. 1000 milliLiter(s) (50 mL/Hr) IV Continuous <Continuous>  dextrose 5%. 1000 milliLiter(s) (100 mL/Hr) IV Continuous <Continuous>  dextrose 50% Injectable 25 Gram(s) IV Push once  dextrose 50% Injectable 12.5 Gram(s) IV Push once  dextrose 50% Injectable 25 Gram(s) IV Push once  diVALproex Sprinkle 250 milliGRAM(s) Oral two times a day  folic acid 1 milliGRAM(s) Oral daily  glucagon  Injectable 1 milliGRAM(s) IntraMuscular once  insulin glargine Injectable (LANTUS) 8 Unit(s) SubCutaneous at bedtime  insulin lispro (ADMELOG) corrective regimen sliding scale   SubCutaneous three times a day before meals  insulin lispro (ADMELOG) corrective regimen sliding scale   SubCutaneous at bedtime  levothyroxine 25 MICROGram(s) Oral daily  memantine 5 milliGRAM(s) Oral at bedtime  metoprolol tartrate 25 milliGRAM(s) Oral two times a day  midodrine 10 milliGRAM(s) Oral <User Schedule>  Nephro-celeste 1 Tablet(s) Oral daily  polyethylene glycol 3350 17 Gram(s) Oral two times a day  senna Syrup 10 milliLiter(s) Oral at bedtime  trihexyphenidyl 2 milliGRAM(s) Oral three times a day      Physical Exam  General: Patient comfortable in bed  Vital Signs Last 12 Hrs  T(F): 98.1 (11-26-21 @ 11:29), Max: 98.6 (11-26-21 @ 05:02)  HR: 65 (11-26-21 @ 11:29) (65 - 98)  BP: 135/69 (11-26-21 @ 11:29) (124/70 - 135/69)  BP(mean): --  RR: 18 (11-26-21 @ 11:29) (18 - 18)  SpO2: 98% (11-26-21 @ 11:29) (98% - 99%)  Neck: No palpable thyroid nodules.  CVS: S1S2, No murmurs  Respiratory: No wheezing, no crepitations  GI: Abdomen soft, bowel sounds positive  Musculoskeletal:  edema lower extremities.   Skin: No skin rashes, no ecchymosis    Diagnostics    Free Thyroxine, Serum: AM Sched. Collection: 06-Nov-2021 06:00 (11-05 @ 13:18)

## 2021-11-26 NOTE — PROGRESS NOTE ADULT - ASSESSMENT
71 M w volume overload, GI consulted for drop in hgb.     1. Volume overload per cardio    2. ABBY on CKD per renal  s/p AV graft    3. Drop in hgb, no overt GI Bleed.      4. RP bleed  s/p Abdominal Angiography and Embolization on 10/29/2021 by Interventional radiology of l4and l5 lumbar artery    5. abdominal distention  -improved        [facility]  Gastroenterology and Hepatology  [address] [place], [place]  Office: [phone]  Cell: [phone]

## 2021-11-26 NOTE — PROGRESS NOTE ADULT - PROBLEM SELECTOR PLAN 1
Will increase Lantus to 8u at bedtime and continue coverage scale. Will continue monitoring FS, log, and FU.  Patient previously on larger dose insulin (Tresiba and Novolog), will continue monitoring and FU DC recommendations.   for compliance with consistent low carb diet.

## 2021-11-26 NOTE — PROGRESS NOTE ADULT - ASSESSMENT
72yo M w/ PMHx of CAD (s/p CABG 2019), CKD (unknown stage), DM2, Parkinson's Disease, HTN, depression presents with bilateral leg swelling  ESRD   Severe LV dysfunction ;  A flutter   Confusion -  at present     1 IR- Tried to re orient pt and tell him not to pull on the perm cath     2 Renal-  HD today     3 CVS- BP controlled at present, on Midodrine (with hold parameters in place, SBP> 160),  Lopressor for heart rate control     4 Anemia -  Retacrit 10k units at HD     5 Vasc - s/p  LUE AVF  with intact staples(vasc to fu re dc the staples)    Behavior is the rate limiting step preventing dc at present but more oriented today      [facility]   [phone]

## 2021-11-27 NOTE — PROGRESS NOTE ADULT - ASSESSMENT
Assessment	  71 h/o CAD (s/p CABG 2019), CKD, DM2, Parkinson's Disease, HTN, depression  a/w LE edema, found to have NSTEMI, cardiomyopathy, ABBY on CKD, Aflutter.    TTE  Moderate to severe segmental left ventricular systolic  dysfunction. There is hypokinesis of anterior wall, septum  and apex. Visual estimate of EF about 35%.  Bilateral pleural effusions.    #Prolong QTC  -EKG 11/26 -- corrected QTc using Bazett calculated 401   -behavioral health f/u for pysch meds     #Atrial flutter.   -rates stable   -heparin drip held due to profound anemia. resume once hgb stable.   -rates are controlled. Cont metoprolol.  -cont mido to augment bp     #CAD (coronary artery disease) - h/o CABG.  - medical management given renal function and significant anemia.   - cont ASA to 81 mg.  - cont high intensity statin.    #Acute on chronic renal failure   - s/p  LUE AVF  -sp permacath placement  -vol removal w HD  -renal f/u     # NSTEMI (non-ST elevation myocardial infarction).   ·  Plan: -  - medical management given renal function and significant anemia.   - cont statin, BB. Add aspirin 81 mg daily once feasible.     Patient of Dr. Simons (Zilyo).

## 2021-11-27 NOTE — PROGRESS NOTE ADULT - SUBJECTIVE AND OBJECTIVE BOX
Follow-up Pulm Progress Note  Brayan Verma MD  [phone]    Breathing comforably on room air: sats in upper 90's  No new resp issues      Vital Signs Last 24 Hrs  T(C): 36.6 (27 Nov 2021 14:00), Max: 36.7 (26 Nov 2021 20:40)  T(F): 97.9 (27 Nov 2021 14:00), Max: 98.1 (26 Nov 2021 20:40)  HR: 76 (27 Nov 2021 14:00) (60 - 80)  BP: 126/50 (27 Nov 2021 14:00) (109/63 - 150/79)  BP(mean): --  RR: 16 (27 Nov 2021 14:00) (16 - 18)  SpO2: 99% (27 Nov 2021 14:00) (97% - 99%)                         9.4    13.16 )-----------( 210      ( 27 Nov 2021 05:36 )             30.8     11-27    134<L>  |  94<L>  |  68<H>  ----------------------------<  177<H>  4.5   |  20<L>  |  6.92<H>    Ca    9.6      27 Nov 2021 05:36    TPro  8.5<H>  /  Alb  3.0<L>  /  TBili  0.7  /  DBili  x   /  AST  30  /  ALT  <5<L>  /  AlkPhos  108  11-26    Physical Examination:  PULM: Diminished basilar BS  CVS: Regular rate and rhythm, no murmurs, rubs, or gallops  ABD: Soft, non-tender  EXT:  No clubbing, cyanosis, or edema    RADIOLOGY REVIEWED  CXR:    CT chest:    TTE:

## 2021-11-27 NOTE — PROGRESS NOTE ADULT - SUBJECTIVE AND OBJECTIVE BOX
Patient is a 71y old  Male who presents with a chief complaint of leg swelling (27 Nov 2021 12:03)      SUBJECTIVE / OVERNIGHT EVENTS:    Patient seen and examined. using profanity. does not want to be touched or talked to.       Vital Signs Last 24 Hrs  T(C): 36.4 (27 Nov 2021 05:17), Max: 36.7 (26 Nov 2021 20:40)  T(F): 97.6 (27 Nov 2021 05:17), Max: 98.1 (26 Nov 2021 20:40)  HR: 60 (27 Nov 2021 11:56) (60 - 80)  BP: 109/63 (27 Nov 2021 11:56) (109/63 - 150/79)  BP(mean): --  RR: 18 (27 Nov 2021 05:17) (18 - 18)  SpO2: 98% (27 Nov 2021 05:17) (97% - 98%)  I&O's Summary    26 Nov 2021 07:01  -  27 Nov 2021 07:00  --------------------------------------------------------  IN: 1045 mL / OUT: 0 mL / NET: 1045 mL    27 Nov 2021 07:01  -  27 Nov 2021 12:54  --------------------------------------------------------  IN: 240 mL / OUT: 0 mL / NET: 240 mL        PE:  GENERAL:  AAOx1-2  HEAD:  Atraumatic, Normocephalic  ABDOMEN: Soft, Nontender  EXTREMITIES:  2+ Peripheral Pulses, No edema, left AVF staples removed, a couple staples remain   NEURO: confused    LABS:                        9.4    13.16 )-----------( 210      ( 27 Nov 2021 05:36 )             30.8     11-27    134<L>  |  94<L>  |  68<H>  ----------------------------<  177<H>  4.5   |  20<L>  |  6.92<H>    Ca    9.6      27 Nov 2021 05:36    TPro  8.5<H>  /  Alb  3.0<L>  /  TBili  0.7  /  DBili  x   /  AST  30  /  ALT  <5<L>  /  AlkPhos  108  11-26      CAPILLARY BLOOD GLUCOSE      POCT Blood Glucose.: 232 mg/dL (27 Nov 2021 11:54)  POCT Blood Glucose.: 193 mg/dL (27 Nov 2021 08:18)  POCT Blood Glucose.: 172 mg/dL (26 Nov 2021 21:38)  POCT Blood Glucose.: 156 mg/dL (26 Nov 2021 16:42)            RADIOLOGY & ADDITIONAL TESTS:    Imaging Personally Reviewed:  [x] YES  [ ] NO    Consultant(s) Notes Reviewed:  [x] YES  [ ] NO    MEDICATIONS  (STANDING):  atorvastatin 80 milliGRAM(s) Oral at bedtime  carbidopa/levodopa  25/100 2 Tablet(s) Oral <User Schedule>  carbidopa/levodopa  25/100 2.5 Tablet(s) Oral <User Schedule>  chlorhexidine 4% Liquid 1 Application(s) Topical <User Schedule>  cholecalciferol 1000 Unit(s) Oral daily  dextrose 5%. 1000 milliLiter(s) (50 mL/Hr) IV Continuous <Continuous>  dextrose 5%. 1000 milliLiter(s) (100 mL/Hr) IV Continuous <Continuous>  dextrose 50% Injectable 25 Gram(s) IV Push once  dextrose 50% Injectable 25 Gram(s) IV Push once  dextrose 50% Injectable 12.5 Gram(s) IV Push once  diVALproex Sprinkle 250 milliGRAM(s) Oral two times a day  folic acid 1 milliGRAM(s) Oral daily  glucagon  Injectable 1 milliGRAM(s) IntraMuscular once  insulin glargine Injectable (LANTUS) 8 Unit(s) SubCutaneous at bedtime  insulin lispro (ADMELOG) corrective regimen sliding scale   SubCutaneous three times a day before meals  insulin lispro (ADMELOG) corrective regimen sliding scale   SubCutaneous at bedtime  levothyroxine 25 MICROGram(s) Oral daily  memantine 5 milliGRAM(s) Oral at bedtime  metoprolol tartrate 25 milliGRAM(s) Oral two times a day  midodrine 10 milliGRAM(s) Oral <User Schedule>  Nephro-celeste 1 Tablet(s) Oral daily  polyethylene glycol 3350 17 Gram(s) Oral two times a day  senna Syrup 10 milliLiter(s) Oral at bedtime  trihexyphenidyl 2 milliGRAM(s) Oral three times a day    MEDICATIONS  (PRN):  acetaminophen     Tablet .. 650 milliGRAM(s) Oral every 6 hours PRN Mild Pain (1 - 3)  albuterol/ipratropium for Nebulization 3 milliLiter(s) Nebulizer every 6 hours PRN Shortness of Breath and/or Wheezing  diVALproex Sprinkle 125 milliGRAM(s) Oral every 8 hours PRN Agitation  guaiFENesin Oral Liquid (Sugar-Free) 200 milliGRAM(s) Oral every 6 hours PRN Cough  sodium chloride 0.9% lock flush 10 milliLiter(s) IV Push every 1 hour PRN Pre/post blood products, medications, blood draw, and to maintain line patency      Care Discussed with Consultants/Other Providers [x] YES  [ ] NO    HEALTH ISSUES - PROBLEM Dx:  Acute heart failure    Atrial flutter    DM2 (diabetes mellitus, type 2)    CAD (coronary artery disease)    Parkinsons disease    Acute on chronic renal failure    NSTEMI (non-ST elevation myocardial infarction)    Hypertension    Acute kidney injury superimposed on CKD    Anemia    Hypothyroidism    Delirium    Advanced care planning/counseling discussion    Palliative care status    Palliative care encounter    Pain    Stage 5 chronic kidney disease not on chronic dialysis

## 2021-11-27 NOTE — PROGRESS NOTE ADULT - ASSESSMENT
72yo M w/ PMHx of CAD (s/p CABG 2019), CKD (unknown stage), DM2, Parkinson's Disease, HTN, depression presents with new onset acute heart failure exacerbation, NSTEMI, started on hep gtt, developed bl RP bleed, acute anemia. sp MICU course for hemorrhagic shock, 10/28 sp IR embolization l4 and l5 lumbar artery. for permacath and AVF.    # Acute systolic and diastolic heart failure, improved  # NSTEMI- conservative mgmt dt renal function and anemia-bld loss  # ESRD, new HD  # Atrial flutter  # acute hypoxic resp failure, improved  # hemorrhagic shock, left RP hematoma, acute blood loss anemia  # lupus anticoagulant +  Echo TTE mod to severe LV SD, hypokinesis anterior wall, not candidate for cath, medically manage  HD per renal  10/28 sp IR embolization l4 and l5 lumbar artery, h/h stable  on midodrine during dialysis  sp permacath placement  sp L AVF - vascular removed staples but a couple remain  minor leukocytosis stable    # Parkinsons disease   # delirium  currently cannot discharge 2/2 behavior and agitation, risk of pulling out permacath  Seroquel stopped for prolonged QTC   corrected per cardio  on depakote 250mg BID, 125mg q8 PRN, ativan only for severe agitation  c/w trihexyphenidyl, carbidopa/levodopa   psych fu    # DM2 (diabetes mellitus, type 2)  per endo  hba1c 6.4    # CAD (coronary artery disease)  # HTN  cont lopressor  c/w atorvastatin  asa on hold    PCP Dr. Simons    DNR/DNI    dispo: dc planning to rehab - has been difficult 2/2 behaviour control and agitation - psych fu    please call [facility] @ [phone] for questions or concerns

## 2021-11-27 NOTE — PROGRESS NOTE ADULT - ASSESSMENT
Assessment  DMT2: 71y Male with DM T2 with hyperglycemia, A1C 6.4%, was on insulin at home, now on low-dose basal insulin and coverage, blood sugars are trending up/running slightly high and not at target, no hypoglycemic episodes. Patient is eating partial meals,  has intermittently agitated.  Hypothyroidism: Patient has no history thyroid disease, was not on any thyroid supplements, subclinical with low-normal FT4, lethargic, started on synthroid 25 mcg po daily, FT4 improved to 1.2.  CHF: on medications, stable, monitored.  HTN: Controlled,  on antihypertensive medications.  Parkinsons: on meds, monitored.  CKD: Monitor labs/BMP      Kelsie Reece MD  Cell: [phone]  Office: [phone]

## 2021-11-27 NOTE — PROGRESS NOTE ADULT - SUBJECTIVE AND OBJECTIVE BOX
Chief complaint    Patient is a 71y old  Male who presents with a chief complaint of leg swelling (27 Nov 2021 09:09)   Review of systems  Patient in bed, appears comfortable.    Labs and Fingersticks  CAPILLARY BLOOD GLUCOSE      POCT Blood Glucose.: 193 mg/dL (27 Nov 2021 08:18)  POCT Blood Glucose.: 172 mg/dL (26 Nov 2021 21:38)  POCT Blood Glucose.: 156 mg/dL (26 Nov 2021 16:42)  POCT Blood Glucose.: 171 mg/dL (26 Nov 2021 11:44)      Anion Gap, Serum: 20 *H* (11-27 @ 05:36)  Anion Gap, Serum: 16 (11-26 @ 05:37)      Calcium, Total Serum: 9.6 (11-27 @ 05:36)  Calcium, Total Serum: 9.7 (11-26 @ 05:37)  Albumin, Serum: 3.0 *L* (11-26 @ 05:37)    Alanine Aminotransferase (ALT/SGPT): <5 *L* (11-26 @ 05:37)  Alkaline Phosphatase, Serum: 108 (11-26 @ 05:37)  Aspartate Aminotransferase (AST/SGOT): 30 (11-26 @ 05:37)        11-27    134<L>  |  94<L>  |  68<H>  ----------------------------<  177<H>  4.5   |  20<L>  |  6.92<H>    Ca    9.6      27 Nov 2021 05:36    TPro  8.5<H>  /  Alb  3.0<L>  /  TBili  0.7  /  DBili  x   /  AST  30  /  ALT  <5<L>  /  AlkPhos  108  11-26                        9.4    13.16 )-----------( 210      ( 27 Nov 2021 05:36 )             30.8     Medications  MEDICATIONS  (STANDING):  atorvastatin 80 milliGRAM(s) Oral at bedtime  carbidopa/levodopa  25/100 2.5 Tablet(s) Oral <User Schedule>  carbidopa/levodopa  25/100 2 Tablet(s) Oral <User Schedule>  chlorhexidine 4% Liquid 1 Application(s) Topical <User Schedule>  cholecalciferol 1000 Unit(s) Oral daily  dextrose 5%. 1000 milliLiter(s) (50 mL/Hr) IV Continuous <Continuous>  dextrose 5%. 1000 milliLiter(s) (100 mL/Hr) IV Continuous <Continuous>  dextrose 50% Injectable 25 Gram(s) IV Push once  dextrose 50% Injectable 12.5 Gram(s) IV Push once  dextrose 50% Injectable 25 Gram(s) IV Push once  diVALproex Sprinkle 250 milliGRAM(s) Oral two times a day  folic acid 1 milliGRAM(s) Oral daily  glucagon  Injectable 1 milliGRAM(s) IntraMuscular once  insulin glargine Injectable (LANTUS) 8 Unit(s) SubCutaneous at bedtime  insulin lispro (ADMELOG) corrective regimen sliding scale   SubCutaneous three times a day before meals  insulin lispro (ADMELOG) corrective regimen sliding scale   SubCutaneous at bedtime  levothyroxine 25 MICROGram(s) Oral daily  memantine 5 milliGRAM(s) Oral at bedtime  metoprolol tartrate 25 milliGRAM(s) Oral two times a day  midodrine 10 milliGRAM(s) Oral <User Schedule>  Nephro-celeste 1 Tablet(s) Oral daily  polyethylene glycol 3350 17 Gram(s) Oral two times a day  senna Syrup 10 milliLiter(s) Oral at bedtime  trihexyphenidyl 2 milliGRAM(s) Oral three times a day      Physical Exam  General: Patient comfortable in bed  Vital Signs Last 12 Hrs  T(F): 97.6 (11-27-21 @ 05:17), Max: 97.6 (11-27-21 @ 05:17)  HR: 65 (11-27-21 @ 05:17) (65 - 65)  BP: 136/79 (11-27-21 @ 05:17) (136/79 - 136/79)  BP(mean): --  RR: 18 (11-27-21 @ 05:17) (18 - 18)  SpO2: 98% (11-27-21 @ 05:17) (98% - 98%)  Neck: No palpable thyroid nodules.  CVS: S1S2, No murmurs  Respiratory: No wheezing, no crepitations  GI: Abdomen soft, bowel sounds positive  Musculoskeletal:  edema lower extremities.     Diagnostics

## 2021-11-27 NOTE — PROGRESS NOTE ADULT - ASSESSMENT
No pain, no shortness of breath    Review of systems: All 10 points ROS was obtained except as above.     acetaminophen     Tablet .. 650 milliGRAM(s) Oral every 6 hours PRN  albuterol/ipratropium for Nebulization 3 milliLiter(s) Nebulizer every 6 hours PRN  atorvastatin 80 milliGRAM(s) Oral at bedtime  carbidopa/levodopa  25/100 2.5 Tablet(s) Oral <User Schedule>  carbidopa/levodopa  25/100 2 Tablet(s) Oral <User Schedule>  chlorhexidine 4% Liquid 1 Application(s) Topical <User Schedule>  cholecalciferol 1000 Unit(s) Oral daily  dextrose 5%. 1000 milliLiter(s) IV Continuous <Continuous>  dextrose 5%. 1000 milliLiter(s) IV Continuous <Continuous>  dextrose 50% Injectable 25 Gram(s) IV Push once  dextrose 50% Injectable 12.5 Gram(s) IV Push once  dextrose 50% Injectable 25 Gram(s) IV Push once  diVALproex Sprinkle 125 milliGRAM(s) Oral every 8 hours PRN  diVALproex Sprinkle 250 milliGRAM(s) Oral two times a day  folic acid 1 milliGRAM(s) Oral daily  glucagon  Injectable 1 milliGRAM(s) IntraMuscular once  guaiFENesin Oral Liquid (Sugar-Free) 200 milliGRAM(s) Oral every 6 hours PRN  insulin glargine Injectable (LANTUS) 8 Unit(s) SubCutaneous at bedtime  insulin lispro (ADMELOG) corrective regimen sliding scale   SubCutaneous three times a day before meals  insulin lispro (ADMELOG) corrective regimen sliding scale   SubCutaneous at bedtime  levothyroxine 25 MICROGram(s) Oral daily  memantine 5 milliGRAM(s) Oral at bedtime  metoprolol tartrate 25 milliGRAM(s) Oral two times a day  midodrine 10 milliGRAM(s) Oral <User Schedule>  midodrine. 10 milliGRAM(s) Oral once  Nephro-celeste 1 Tablet(s) Oral daily  polyethylene glycol 3350 17 Gram(s) Oral two times a day  senna Syrup 10 milliLiter(s) Oral at bedtime  sodium chloride 0.9% lock flush 10 milliLiter(s) IV Push every 1 hour PRN  trihexyphenidyl 2 milliGRAM(s) Oral three times a day      VITAL:  T(C): , Max: 36.7 (11-26-21 @ 20:40)  T(F): , Max: 98.1 (11-26-21 @ 20:40)  HR: 60 (11-27-21 @ 11:56)  BP: 109/63 (11-27-21 @ 11:56)  BP(mean): --  RR: 18 (11-27-21 @ 05:17)  SpO2: 98% (11-27-21 @ 05:17)  Wt(kg): --    11-26-21 @ 07:01  -  11-27-21 @ 07:00  --------------------------------------------------------  IN: 1045 mL / OUT: 0 mL / NET: 1045 mL        PHYSICAL EXAM:  Constitutional: NAD  Neck:  No JVD  Respiratory: CTAB/L  Cardiovascular: S1 and S2  Gastrointestinal: BS+, soft, NT/ND  Extremities: No peripheral edema  Neurological: A/O x 3, no focal deficits  Psychiatric: Normal mood, normal affect  : No Javier  Skin: No rashes  Access: avg and perm       LABS:                          9.4    13.16 )-----------( 210      ( 27 Nov 2021 05:36 )             30.8     Na(134)/K(4.5)/Cl(94)/HCO3(20)/BUN(68)/Cr(6.92)Glu(177)/Ca(9.6)/Mg(--)/PO4(--)    11-27 @ 05:36  Na(133)/K(3.9)/Cl(94)/HCO3(23)/BUN(51)/Cr(5.77)Glu(205)/Ca(9.7)/Mg(--)/PO4(--)    11-26 @ 05:37            ASSESSMENT/PLAN  Assessment:  72yo M w/ PMHx of CAD (s/p CABG 2019), CKD (unknown stage), DM2, Parkinson's Disease, HTN, depression presents with bilateral leg swelling  ESRD   Severe LV dysfunction ;  A flutter   Confusion -  at present     1 IR- Tried to re orient pt and tell him not to pull on the perm cath     2 Renal-  HD today     3 CVS- BP controlled at present, on Midodrine (with hold parameters in place, SBP> 160),  Lopressor for heart rate control     4 Anemia -  Retacrit 10k units at HD     5 Vasc - s/p  LUE AVF  with intact staples(vasc to fu re dc the staples)    Behavior is the rate limiting step preventing dc at present but more oriented today       Get Delcid NP-BC  [email or web address]  [phone]   Seen , doing well on HD now.   Denies complaints  Next HD Tuesday    acetaminophen     Tablet .. 650 milliGRAM(s) Oral every 6 hours PRN  albuterol/ipratropium for Nebulization 3 milliLiter(s) Nebulizer every 6 hours PRN  atorvastatin 80 milliGRAM(s) Oral at bedtime  carbidopa/levodopa  25/100 2.5 Tablet(s) Oral <User Schedule>  carbidopa/levodopa  25/100 2 Tablet(s) Oral <User Schedule>  chlorhexidine 4% Liquid 1 Application(s) Topical <User Schedule>  cholecalciferol 1000 Unit(s) Oral daily  dextrose 5%. 1000 milliLiter(s) IV Continuous <Continuous>  dextrose 5%. 1000 milliLiter(s) IV Continuous <Continuous>  dextrose 50% Injectable 25 Gram(s) IV Push once  dextrose 50% Injectable 12.5 Gram(s) IV Push once  dextrose 50% Injectable 25 Gram(s) IV Push once  diVALproex Sprinkle 125 milliGRAM(s) Oral every 8 hours PRN  diVALproex Sprinkle 250 milliGRAM(s) Oral two times a day  folic acid 1 milliGRAM(s) Oral daily  glucagon  Injectable 1 milliGRAM(s) IntraMuscular once  guaiFENesin Oral Liquid (Sugar-Free) 200 milliGRAM(s) Oral every 6 hours PRN  insulin glargine Injectable (LANTUS) 8 Unit(s) SubCutaneous at bedtime  insulin lispro (ADMELOG) corrective regimen sliding scale   SubCutaneous three times a day before meals  insulin lispro (ADMELOG) corrective regimen sliding scale   SubCutaneous at bedtime  levothyroxine 25 MICROGram(s) Oral daily  memantine 5 milliGRAM(s) Oral at bedtime  metoprolol tartrate 25 milliGRAM(s) Oral two times a day  midodrine 10 milliGRAM(s) Oral <User Schedule>  midodrine. 10 milliGRAM(s) Oral once  Nephro-celeste 1 Tablet(s) Oral daily  polyethylene glycol 3350 17 Gram(s) Oral two times a day  senna Syrup 10 milliLiter(s) Oral at bedtime  sodium chloride 0.9% lock flush 10 milliLiter(s) IV Push every 1 hour PRN  trihexyphenidyl 2 milliGRAM(s) Oral three times a day      VITAL:  T(C): , Max: 36.7 (11-26-21 @ 20:40)  T(F): , Max: 98.1 (11-26-21 @ 20:40)  HR: 60 (11-27-21 @ 11:56)  BP: 109/63 (11-27-21 @ 11:56)  BP(mean): --  RR: 18 (11-27-21 @ 05:17)  SpO2: 98% (11-27-21 @ 05:17)  Wt(kg): --    11-26-21 @ 07:01  -  11-27-21 @ 07:00  --------------------------------------------------------  IN: 1045 mL / OUT: 0 mL / NET: 1045 mL        PHYSICAL EXAM:  Constitutional: NAD  Neck:  No JVD  Respiratory: CTAB/L  Cardiovascular: S1 and S2  Gastrointestinal: BS+, soft, NT/ND  Extremities: No peripheral edema  Neurological: A/O x 3, no focal deficits  Psychiatric: Normal mood, normal affect  : No Javier  Skin: No rashes  Access: avg and perm       LABS:                          9.4    13.16 )-----------( 210      ( 27 Nov 2021 05:36 )             30.8     Na(134)/K(4.5)/Cl(94)/HCO3(20)/BUN(68)/Cr(6.92)Glu(177)/Ca(9.6)/Mg(--)/PO4(--)    11-27 @ 05:36  Na(133)/K(3.9)/Cl(94)/HCO3(23)/BUN(51)/Cr(5.77)Glu(205)/Ca(9.7)/Mg(--)/PO4(--)    11-26 @ 05:37            ASSESSMENT/PLAN  Assessment:  72yo M w/ PMHx of CAD (s/p CABG 2019), CKD (unknown stage), DM2, Parkinson's Disease, HTN, depression presents with bilateral leg swelling  ESRD   Severe LV dysfunction ;  A flutter   Confusion -  alert at present on HD     1 IR- Tried to re orient pt and tell him not to pull on the perm cath     2 Renal-  On HD now    3 CVS- BP controlled at present, on Midodrine (with hold parameters in place, SBP> 160),  Lopressor for heart rate control     4 Anemia -  Retacrit 10k units with  HD     5 Vasc - s/p  LUE AVF  with intact staples(vasc to fu re dc the staples)    Behavior is the rate limiting step preventing dc at present. Much oriented today      Get Delcid NP-BC  [facility]  [phone]

## 2021-11-27 NOTE — PROGRESS NOTE ADULT - SUBJECTIVE AND OBJECTIVE BOX
CARDIOLOGY FOLLOW UP - Dr. Lee  Date of Service: 11/27/21  CC: resting comfortably , NAD     Review of Systems:  Constitutional: No fever, weight loss, or fatigue  Respiratory: No cough, wheezing, or hemoptysis, no shortness of breath  Cardiovascular: No chest pain, palpitations, passing out, dizziness, or leg swelling  Gastrointestinal: No abd or epigastric pain.  No nausea, vomiting, or hematemesis; no diarrhea or constipation, no melena or hematochezia  Vascular: no edema       PHYSICAL EXAM:  T(C): 36.4 (11-27-21 @ 05:17), Max: 36.7 (11-26-21 @ 11:29)  HR: 65 (11-27-21 @ 05:17) (65 - 80)  BP: 136/79 (11-27-21 @ 05:17) (135/69 - 150/79)  RR: 18 (11-27-21 @ 05:17) (18 - 18)  SpO2: 98% (11-27-21 @ 05:17) (97% - 98%)  Wt(kg): --  I&O's Summary    26 Nov 2021 07:01  -  27 Nov 2021 07:00  --------------------------------------------------------  IN: 1045 mL / OUT: 0 mL / NET: 1045 mL        Appearance: Normal	  Cardiovascular: Normal S1 S2,RRR, No JVD, No murmurs  Respiratory: Lungs clear to auscultation	  Gastrointestinal:  Soft, Non-tender, + BS	  Extremities: Normal range of motion, No clubbing, cyanosis or edema      Home Medications:  aspirin 325 mg oral tablet: 1 tab(s) orally once a day (21 Oct 2021 06:50)  atorvastatin 80 mg oral tablet: 1 tab(s) orally once a day (at bedtime) (21 Oct 2021 06:50)  carbidopa-levodopa 25 mg-100 mg oral tablet: 2 tab(s) orally 3 times a day (21 Oct 2021 06:50)  folic acid 1 mg oral tablet: 1 tab(s) orally once a day (21 Oct 2021 06:50)  metoprolol succinate 25 mg oral tablet, extended release: 1 tab(s) orally once a day (21 Oct 2021 06:50)  NovoLOG 100 units/mL subcutaneous solution: 5 unit(s) subcutaneous 3 times a day (with meals) (21 Oct 2021 06:50)  Tresiba 100 units/mL subcutaneous solution: 22 unit(s) subcutaneous once a day (21 Oct 2021 06:50)  trihexyphenidyl 2 mg oral tablet: 1 tab(s) orally 3 times a day (21 Oct 2021 06:50)  Vitamin D3 25 mcg (1000 intl units) oral capsule: 1 cap(s) orally once a day (21 Oct 2021 06:50)      MEDICATIONS  (STANDING):  atorvastatin 80 milliGRAM(s) Oral at bedtime  carbidopa/levodopa  25/100 2.5 Tablet(s) Oral <User Schedule>  carbidopa/levodopa  25/100 2 Tablet(s) Oral <User Schedule>  chlorhexidine 4% Liquid 1 Application(s) Topical <User Schedule>  cholecalciferol 1000 Unit(s) Oral daily  dextrose 5%. 1000 milliLiter(s) (50 mL/Hr) IV Continuous <Continuous>  dextrose 5%. 1000 milliLiter(s) (100 mL/Hr) IV Continuous <Continuous>  dextrose 50% Injectable 25 Gram(s) IV Push once  dextrose 50% Injectable 12.5 Gram(s) IV Push once  dextrose 50% Injectable 25 Gram(s) IV Push once  diVALproex Sprinkle 250 milliGRAM(s) Oral two times a day  folic acid 1 milliGRAM(s) Oral daily  glucagon  Injectable 1 milliGRAM(s) IntraMuscular once  insulin glargine Injectable (LANTUS) 8 Unit(s) SubCutaneous at bedtime  insulin lispro (ADMELOG) corrective regimen sliding scale   SubCutaneous three times a day before meals  insulin lispro (ADMELOG) corrective regimen sliding scale   SubCutaneous at bedtime  levothyroxine 25 MICROGram(s) Oral daily  memantine 5 milliGRAM(s) Oral at bedtime  metoprolol tartrate 25 milliGRAM(s) Oral two times a day  midodrine 10 milliGRAM(s) Oral <User Schedule>  Nephro-celeste 1 Tablet(s) Oral daily  polyethylene glycol 3350 17 Gram(s) Oral two times a day  senna Syrup 10 milliLiter(s) Oral at bedtime  trihexyphenidyl 2 milliGRAM(s) Oral three times a day      TELEMETRY: aflutter 80-90s   ECG:  	  RADIOLOGY:   DIAGNOSTIC TESTING:  [ ] Echocardiogram:  [ ]  Catheterization:  [ ] Stress Test:    OTHER: 	    LABS:	 	                            9.4    13.16 )-----------( 210      ( 27 Nov 2021 05:36 )             30.8     11-27    134<L>  |  94<L>  |  68<H>  ----------------------------<  177<H>  4.5   |  20<L>  |  6.92<H>    Ca    9.6      27 Nov 2021 05:36    TPro  8.5<H>  /  Alb  3.0<L>  /  TBili  0.7  /  DBili  x   /  AST  30  /  ALT  <5<L>  /  AlkPhos  108  11-26

## 2021-11-28 NOTE — PROGRESS NOTE ADULT - ASSESSMENT
70yo M w/ PMHx of CAD (s/p CABG 2019), CKD (unknown stage), DM2, Parkinson's Disease, HTN, depression presents with new onset acute heart failure exacerbation, NSTEMI, started on hep gtt, developed bl RP bleed, acute anemia. sp MICU course for hemorrhagic shock, 10/28 sp IR embolization l4 and l5 lumbar artery. for permacath and AVF.    # Acute systolic and diastolic heart failure, improved  # NSTEMI - conservative mgmt dt renal function and anemia-bld loss  # ESRD, new HD  # Atrial flutter  # acute hypoxic resp failure, improved  # hemorrhagic shock, left RP hematoma, acute blood loss anemia  # lupus anticoagulant +  Echo TTE mod to severe LV SD, hypokinesis anterior wall, not candidate for cath, medically manage  HD per renal  10/28 sp IR embolization l4 and l5 lumbar artery, h/h stable  on midodrine during dialysis  sp permacath placement  sp L AVF - vascular removed staples but a couple remain  still with leukocytosis, afebrile, no signs of infection    # Parkinsons disease   # delirium  currently cannot discharge 2/2 behavior and agitation, risk of pulling out permacath   corrected per cardio, psych FU  on depakote 250mg BID, 125mg q8 PRN, ativan only for severe agitation  c/w trihexyphenidyl, carbidopa/levodopa     # DM2 (diabetes mellitus, type 2)  per endo  hba1c 6.4    # CAD (coronary artery disease)  # HTN  cont lopressor  c/w atorvastatin  asa on hold    PCP Dr. Simons    DNR/DNI    dispo: dc planning to rehab - has been difficult 2/2 behaviour control and agitation - psych fu    please call [facility] @ [phone] for questions or concerns 72yo M w/ PMHx of CAD (s/p CABG 2019), CKD (unknown stage), DM2, Parkinson's Disease, HTN, depression presents with new onset acute heart failure exacerbation, NSTEMI, started on hep gtt, developed bl RP bleed, acute anemia. sp MICU course for hemorrhagic shock, 10/28 sp IR embolization l4 and l5 lumbar artery. for permacath and AVF.    # Acute systolic and diastolic heart failure, improved  # NSTEMI - conservative mgmt dt renal function and anemia-bld loss  # ESRD, new HD  # Atrial flutter  # acute hypoxic resp failure, improved  # hemorrhagic shock, left RP hematoma, acute blood loss anemia  # lupus anticoagulant +  Echo TTE mod to severe LV SD, hypokinesis anterior wall, not candidate for cath, medically manage  HD per renal  10/28 sp IR embolization l4 and l5 lumbar artery, h/h stable  on midodrine during dialysis  sp permacath placement  sp L AVF - vascular removed staples but a couple remain  still with leukocytosis, afebrile, no signs of infection    # Parkinsons disease   # delirium  currently cannot discharge 2/2 behavior and agitation, risk of pulling out permacath   corrected per cardio, psych FU  on depakote 250mg BID, 125mg q8 PRN, ativan only for severe agitation  c/w trihexyphenidyl, carbidopa/levodopa   per neuro, probable early lewy body dementia related paranoia and agitation    # DM2 (diabetes mellitus, type 2)  per endo  hba1c 6.4    # CAD (coronary artery disease)  # HTN  cont lopressor  c/w atorvastatin  asa on hold    PCP Dr. Simons    DNR/DNI    dispo: dc planning to rehab - has been difficult 2/2 behaviour control and agitation - psych fu    updated son at bedside today    please call [facility] @ [phone] for questions or concerns

## 2021-11-28 NOTE — PROGRESS NOTE ADULT - SUBJECTIVE AND OBJECTIVE BOX
Chief complaint    Patient is a 71y old  Male who presents with a chief complaint of leg swelling (28 Nov 2021 11:11)   Review of systems  Patient in bed, appears comfortable.    Labs and Fingersticks  CAPILLARY BLOOD GLUCOSE      POCT Blood Glucose.: 167 mg/dL (28 Nov 2021 11:54)  POCT Blood Glucose.: 153 mg/dL (28 Nov 2021 07:57)  POCT Blood Glucose.: 237 mg/dL (27 Nov 2021 21:06)  POCT Blood Glucose.: 229 mg/dL (27 Nov 2021 17:55)      Anion Gap, Serum: 18 *H* (11-28 @ 05:42)  Anion Gap, Serum: 20 *H* (11-27 @ 05:36)      Calcium, Total Serum: 9.6 (11-28 @ 05:42)  Calcium, Total Serum: 9.6 (11-27 @ 05:36)          11-28    136  |  95<L>  |  45<H>  ----------------------------<  117<H>  4.2   |  23  |  5.05<H>    Ca    9.6      28 Nov 2021 05:42                          9.6    14.10 )-----------( 232      ( 28 Nov 2021 05:42 )             32.2     Medications  MEDICATIONS  (STANDING):  atorvastatin 80 milliGRAM(s) Oral at bedtime  carbidopa/levodopa  25/100 2.5 Tablet(s) Oral <User Schedule>  carbidopa/levodopa  25/100 2 Tablet(s) Oral <User Schedule>  chlorhexidine 4% Liquid 1 Application(s) Topical <User Schedule>  cholecalciferol 1000 Unit(s) Oral daily  dextrose 5%. 1000 milliLiter(s) (50 mL/Hr) IV Continuous <Continuous>  dextrose 5%. 1000 milliLiter(s) (100 mL/Hr) IV Continuous <Continuous>  dextrose 50% Injectable 25 Gram(s) IV Push once  dextrose 50% Injectable 12.5 Gram(s) IV Push once  dextrose 50% Injectable 25 Gram(s) IV Push once  diVALproex Sprinkle 250 milliGRAM(s) Oral two times a day  folic acid 1 milliGRAM(s) Oral daily  glucagon  Injectable 1 milliGRAM(s) IntraMuscular once  insulin glargine Injectable (LANTUS) 8 Unit(s) SubCutaneous at bedtime  insulin lispro (ADMELOG) corrective regimen sliding scale   SubCutaneous three times a day before meals  insulin lispro (ADMELOG) corrective regimen sliding scale   SubCutaneous at bedtime  levothyroxine 25 MICROGram(s) Oral daily  memantine 5 milliGRAM(s) Oral at bedtime  metoprolol tartrate 25 milliGRAM(s) Oral two times a day  midodrine 10 milliGRAM(s) Oral <User Schedule>  Nephro-celeste 1 Tablet(s) Oral daily  polyethylene glycol 3350 17 Gram(s) Oral two times a day  senna Syrup 10 milliLiter(s) Oral at bedtime  trihexyphenidyl 2 milliGRAM(s) Oral three times a day      Physical Exam  General: Patient comfortable in bed  Vital Signs Last 12 Hrs  T(F): 97.8 (11-28-21 @ 11:15), Max: 98 (11-28-21 @ 04:14)  HR: 100 (11-28-21 @ 11:15) (76 - 100)  BP: 119/79 (11-28-21 @ 11:15) (119/79 - 124/69)  BP(mean): --  RR: 17 (11-28-21 @ 11:15) (17 - 18)  SpO2: 100% (11-28-21 @ 11:15) (99% - 100%)  Neck: No palpable thyroid nodules.  CVS: S1S2, No murmurs  Respiratory: No wheezing, no crepitations  GI: Abdomen soft, bowel sounds positive  Musculoskeletal:  edema lower extremities.     Diagnostics

## 2021-11-28 NOTE — PROGRESS NOTE ADULT - SUBJECTIVE AND OBJECTIVE BOX
Patient is a 71y old  Male who presents with a chief complaint of leg swelling (27 Nov 2021 17:00)      SUBJECTIVE / OVERNIGHT EVENTS:    Patient seen and examined.       Vital Signs Last 24 Hrs  T(C): 36.7 (28 Nov 2021 04:14), Max: 36.8 (27 Nov 2021 20:48)  T(F): 98 (28 Nov 2021 04:14), Max: 98.3 (27 Nov 2021 20:48)  HR: 76 (28 Nov 2021 04:14) (60 - 88)  BP: 124/69 (28 Nov 2021 04:14) (104/61 - 148/84)  BP(mean): --  RR: 18 (28 Nov 2021 04:14) (16 - 18)  SpO2: 99% (28 Nov 2021 04:14) (97% - 100%)  I&O's Summary    27 Nov 2021 07:01  -  28 Nov 2021 07:00  --------------------------------------------------------  IN: 360 mL / OUT: 25 mL / NET: 335 mL        PE:  GENERAL:  AAOx1-2  HEAD:  Atraumatic, Normocephalic  ABDOMEN: Soft, Nontender  EXTREMITIES:  2+ Peripheral Pulses, No edema, left AVF staples removed, a couple staples remain   NEURO: confused    LABS:                        9.6    14.10 )-----------( 232      ( 28 Nov 2021 05:42 )             32.2     11-28    136  |  95<L>  |  45<H>  ----------------------------<  117<H>  4.2   |  23  |  5.05<H>    Ca    9.6      28 Nov 2021 05:42        CAPILLARY BLOOD GLUCOSE      POCT Blood Glucose.: 153 mg/dL (28 Nov 2021 07:57)  POCT Blood Glucose.: 237 mg/dL (27 Nov 2021 21:06)  POCT Blood Glucose.: 229 mg/dL (27 Nov 2021 17:55)  POCT Blood Glucose.: 232 mg/dL (27 Nov 2021 11:54)            RADIOLOGY & ADDITIONAL TESTS:    Imaging Personally Reviewed:  [x] YES  [ ] NO    Consultant(s) Notes Reviewed:  [x] YES  [ ] NO    MEDICATIONS  (STANDING):  atorvastatin 80 milliGRAM(s) Oral at bedtime  carbidopa/levodopa  25/100 2 Tablet(s) Oral <User Schedule>  carbidopa/levodopa  25/100 2.5 Tablet(s) Oral <User Schedule>  chlorhexidine 4% Liquid 1 Application(s) Topical <User Schedule>  cholecalciferol 1000 Unit(s) Oral daily  dextrose 5%. 1000 milliLiter(s) (50 mL/Hr) IV Continuous <Continuous>  dextrose 5%. 1000 milliLiter(s) (100 mL/Hr) IV Continuous <Continuous>  dextrose 50% Injectable 25 Gram(s) IV Push once  dextrose 50% Injectable 12.5 Gram(s) IV Push once  dextrose 50% Injectable 25 Gram(s) IV Push once  diVALproex Sprinkle 250 milliGRAM(s) Oral two times a day  folic acid 1 milliGRAM(s) Oral daily  glucagon  Injectable 1 milliGRAM(s) IntraMuscular once  insulin glargine Injectable (LANTUS) 8 Unit(s) SubCutaneous at bedtime  insulin lispro (ADMELOG) corrective regimen sliding scale   SubCutaneous three times a day before meals  insulin lispro (ADMELOG) corrective regimen sliding scale   SubCutaneous at bedtime  levothyroxine 25 MICROGram(s) Oral daily  memantine 5 milliGRAM(s) Oral at bedtime  metoprolol tartrate 25 milliGRAM(s) Oral two times a day  midodrine 10 milliGRAM(s) Oral <User Schedule>  Nephro-celeste 1 Tablet(s) Oral daily  polyethylene glycol 3350 17 Gram(s) Oral two times a day  senna Syrup 10 milliLiter(s) Oral at bedtime  trihexyphenidyl 2 milliGRAM(s) Oral three times a day    MEDICATIONS  (PRN):  acetaminophen     Tablet .. 650 milliGRAM(s) Oral every 6 hours PRN Mild Pain (1 - 3)  albuterol/ipratropium for Nebulization 3 milliLiter(s) Nebulizer every 6 hours PRN Shortness of Breath and/or Wheezing  diVALproex Sprinkle 125 milliGRAM(s) Oral every 8 hours PRN Agitation  guaiFENesin Oral Liquid (Sugar-Free) 200 milliGRAM(s) Oral every 6 hours PRN Cough  sodium chloride 0.9% lock flush 10 milliLiter(s) IV Push every 1 hour PRN Pre/post blood products, medications, blood draw, and to maintain line patency      Care Discussed with Consultants/Other Providers [x] YES  [ ] NO    HEALTH ISSUES - PROBLEM Dx:  Acute heart failure    Atrial flutter    DM2 (diabetes mellitus, type 2)    CAD (coronary artery disease)    Parkinsons disease    Acute on chronic renal failure    NSTEMI (non-ST elevation myocardial infarction)    Hypertension    Acute kidney injury superimposed on CKD    Anemia    Hypothyroidism    Delirium    Advanced care planning/counseling discussion    Palliative care status    Palliative care encounter    Pain    Stage 5 chronic kidney disease not on chronic dialysis         Patient is a 71y old  Male who presents with a chief complaint of leg swelling (27 Nov 2021 17:00)      SUBJECTIVE / OVERNIGHT EVENTS:    Patient seen and examined. telling stories about false events. son at bedside. patient states he still sees the bugs. states he went out to breakfast this morning and i was there.      Vital Signs Last 24 Hrs  T(C): 36.7 (28 Nov 2021 04:14), Max: 36.8 (27 Nov 2021 20:48)  T(F): 98 (28 Nov 2021 04:14), Max: 98.3 (27 Nov 2021 20:48)  HR: 76 (28 Nov 2021 04:14) (60 - 88)  BP: 124/69 (28 Nov 2021 04:14) (104/61 - 148/84)  BP(mean): --  RR: 18 (28 Nov 2021 04:14) (16 - 18)  SpO2: 99% (28 Nov 2021 04:14) (97% - 100%)  I&O's Summary    27 Nov 2021 07:01  -  28 Nov 2021 07:00  --------------------------------------------------------  IN: 360 mL / OUT: 25 mL / NET: 335 mL        PE:  GENERAL:  AAOx1-2  HEAD:  Atraumatic, Normocephalic  ABDOMEN: Soft, Nontender  EXTREMITIES:  2+ Peripheral Pulses, No edema, left AVF staples removed, a couple staples remain   NEURO: confused    LABS:                        9.6    14.10 )-----------( 232      ( 28 Nov 2021 05:42 )             32.2     11-28    136  |  95<L>  |  45<H>  ----------------------------<  117<H>  4.2   |  23  |  5.05<H>    Ca    9.6      28 Nov 2021 05:42        CAPILLARY BLOOD GLUCOSE      POCT Blood Glucose.: 153 mg/dL (28 Nov 2021 07:57)  POCT Blood Glucose.: 237 mg/dL (27 Nov 2021 21:06)  POCT Blood Glucose.: 229 mg/dL (27 Nov 2021 17:55)  POCT Blood Glucose.: 232 mg/dL (27 Nov 2021 11:54)            RADIOLOGY & ADDITIONAL TESTS:    Imaging Personally Reviewed:  [x] YES  [ ] NO    Consultant(s) Notes Reviewed:  [x] YES  [ ] NO    MEDICATIONS  (STANDING):  atorvastatin 80 milliGRAM(s) Oral at bedtime  carbidopa/levodopa  25/100 2 Tablet(s) Oral <User Schedule>  carbidopa/levodopa  25/100 2.5 Tablet(s) Oral <User Schedule>  chlorhexidine 4% Liquid 1 Application(s) Topical <User Schedule>  cholecalciferol 1000 Unit(s) Oral daily  dextrose 5%. 1000 milliLiter(s) (50 mL/Hr) IV Continuous <Continuous>  dextrose 5%. 1000 milliLiter(s) (100 mL/Hr) IV Continuous <Continuous>  dextrose 50% Injectable 25 Gram(s) IV Push once  dextrose 50% Injectable 12.5 Gram(s) IV Push once  dextrose 50% Injectable 25 Gram(s) IV Push once  diVALproex Sprinkle 250 milliGRAM(s) Oral two times a day  folic acid 1 milliGRAM(s) Oral daily  glucagon  Injectable 1 milliGRAM(s) IntraMuscular once  insulin glargine Injectable (LANTUS) 8 Unit(s) SubCutaneous at bedtime  insulin lispro (ADMELOG) corrective regimen sliding scale   SubCutaneous three times a day before meals  insulin lispro (ADMELOG) corrective regimen sliding scale   SubCutaneous at bedtime  levothyroxine 25 MICROGram(s) Oral daily  memantine 5 milliGRAM(s) Oral at bedtime  metoprolol tartrate 25 milliGRAM(s) Oral two times a day  midodrine 10 milliGRAM(s) Oral <User Schedule>  Nephro-celeste 1 Tablet(s) Oral daily  polyethylene glycol 3350 17 Gram(s) Oral two times a day  senna Syrup 10 milliLiter(s) Oral at bedtime  trihexyphenidyl 2 milliGRAM(s) Oral three times a day    MEDICATIONS  (PRN):  acetaminophen     Tablet .. 650 milliGRAM(s) Oral every 6 hours PRN Mild Pain (1 - 3)  albuterol/ipratropium for Nebulization 3 milliLiter(s) Nebulizer every 6 hours PRN Shortness of Breath and/or Wheezing  diVALproex Sprinkle 125 milliGRAM(s) Oral every 8 hours PRN Agitation  guaiFENesin Oral Liquid (Sugar-Free) 200 milliGRAM(s) Oral every 6 hours PRN Cough  sodium chloride 0.9% lock flush 10 milliLiter(s) IV Push every 1 hour PRN Pre/post blood products, medications, blood draw, and to maintain line patency      Care Discussed with Consultants/Other Providers [x] YES  [ ] NO    HEALTH ISSUES - PROBLEM Dx:  Acute heart failure    Atrial flutter    DM2 (diabetes mellitus, type 2)    CAD (coronary artery disease)    Parkinsons disease    Acute on chronic renal failure    NSTEMI (non-ST elevation myocardial infarction)    Hypertension    Acute kidney injury superimposed on CKD    Anemia    Hypothyroidism    Delirium    Advanced care planning/counseling discussion    Palliative care status    Palliative care encounter    Pain    Stage 5 chronic kidney disease not on chronic dialysis

## 2021-11-28 NOTE — PROGRESS NOTE ADULT - PROBLEM SELECTOR PLAN 1
Will continue current insulin regimen for now. Will continue monitoring  blood sugars, will Follow up.  Patient previously on larger dose insulin (Tresiba and Novolog), will continue monitoring and FU DC recommendations.   for compliance with consistent low carb diet.

## 2021-11-28 NOTE — PROGRESS NOTE ADULT - ASSESSMENT
Assessment  DMT2: 71y Male with DM T2 with hyperglycemia, A1C 6.4%, was on insulin at home, now on low-dose basal insulin and coverage, blood sugars are trending up/running slightly high and not at target, no hypoglycemic episode, eating meals, calm today.  Hypothyroidism: Patient has no history thyroid disease, was not on any thyroid supplements, subclinical with low-normal FT4, lethargic, started on synthroid 25 mcg po daily, FT4 improved to 1.2.  CHF: on medications, stable, monitored.  HTN: Controlled,  on antihypertensive medications.  Parkinsons: on meds, monitored.  CKD: Monitor labs/BMP      Kelsie Reece MD  Cell: [phone]  Office: [phone]

## 2021-11-29 NOTE — PROGRESS NOTE ADULT - SUBJECTIVE AND OBJECTIVE BOX
NEPHROLOGY-[facility] [phone]        Patient seen and examined         MEDICATIONS  (STANDING):  atorvastatin 80 milliGRAM(s) Oral at bedtime  carbidopa/levodopa  25/100 2.5 Tablet(s) Oral <User Schedule>  carbidopa/levodopa  25/100 2 Tablet(s) Oral <User Schedule>  chlorhexidine 4% Liquid 1 Application(s) Topical <User Schedule>  cholecalciferol 1000 Unit(s) Oral daily  dextrose 5%. 1000 milliLiter(s) (50 mL/Hr) IV Continuous <Continuous>  dextrose 5%. 1000 milliLiter(s) (100 mL/Hr) IV Continuous <Continuous>  dextrose 50% Injectable 25 Gram(s) IV Push once  dextrose 50% Injectable 12.5 Gram(s) IV Push once  dextrose 50% Injectable 25 Gram(s) IV Push once  diVALproex Sprinkle 250 milliGRAM(s) Oral two times a day  folic acid 1 milliGRAM(s) Oral daily  glucagon  Injectable 1 milliGRAM(s) IntraMuscular once  insulin glargine Injectable (LANTUS) 8 Unit(s) SubCutaneous at bedtime  insulin lispro (ADMELOG) corrective regimen sliding scale   SubCutaneous three times a day before meals  insulin lispro (ADMELOG) corrective regimen sliding scale   SubCutaneous at bedtime  levothyroxine 25 MICROGram(s) Oral daily  memantine 5 milliGRAM(s) Oral at bedtime  metoprolol tartrate 25 milliGRAM(s) Oral two times a day  midodrine 10 milliGRAM(s) Oral <User Schedule>  Nephro-celeste 1 Tablet(s) Oral daily  polyethylene glycol 3350 17 Gram(s) Oral two times a day  senna Syrup 10 milliLiter(s) Oral at bedtime  trihexyphenidyl 2 milliGRAM(s) Oral three times a day      VITAL:  T(C): , Max: 37 (11-28-21 @ 21:42)  T(F): , Max: 98.6 (11-28-21 @ 21:42)  HR: 114 (11-29-21 @ 05:06)  BP: 138/81 (11-29-21 @ 05:06)  BP(mean): --  RR: 18 (11-29-21 @ 05:06)  SpO2: 100% (11-29-21 @ 05:06)  Wt(kg): --    I and O's:    11-28 @ 07:01 - 11-29 @ 07:00  --------------------------------------------------------  IN: 590 mL / OUT: 0 mL / NET: 590 mL    11-29 @ 07:01 - 11-29 @ 10:47  --------------------------------------------------------  IN: 100 mL / OUT: 0 mL / NET: 100 mL          PHYSICAL EXAM:    Constitutional: NAD  Neck:  No JVD  Respiratory: CTAB/L  Cardiovascular: S1 and S2  Gastrointestinal: BS+, soft, NT/ND  Extremities: No peripheral edema  Neurological: A/O x 3, no focal deficits  Psychiatric: Normal mood, normal affect  : No Javier  Skin: No rashes  Access: Not applicable    LABS:                        10.1   14.93 )-----------( 210      ( 29 Nov 2021 07:24 )             33.5     11-29    133<L>  |  93<L>  |  64<H>  ----------------------------<  106<H>  4.9   |  22  |  6.30<H>    Ca    9.9      29 Nov 2021 07:20            Urine Studies:          RADIOLOGY & ADDITIONAL STUDIES:

## 2021-11-29 NOTE — PROGRESS NOTE ADULT - ASSESSMENT
72yo M w/ PMHx of CAD (s/p CABG 2019), CKD (unknown stage), DM2, Parkinson's Disease, HTN, depression presents with bilateral leg swelling  ESRD   Severe LV dysfunction ;  A flutter   Confusion -         1 IR- Tried to re orient pt and tell him not to pull on the perm cath     2 Renal-  Next HD in am     3 CVS- BP controlled at present, on Midodrine (with hold parameters in place, SBP> 160),  Lopressor for heart rate control     4 Anemia -  Retacrit 4k units with  HD     5 Vasc - s/p  LUE AVF  and the sutures were removed     Behavior is the rate limiting step preventing dc at present.    DW PCP     [facility] [email or web address]   [phone]

## 2021-11-29 NOTE — PROGRESS NOTE ADULT - ASSESSMENT
70yo M w/ PMHx of CAD (s/p CABG 2019), CKD (unknown stage), DM2, Parkinson's Disease, HTN, depression presents with new onset acute heart failure exacerbation, NSTEMI, started on hep gtt, developed bl RP bleed, acute anemia. sp MICU course for hemorrhagic shock, 10/28 sp IR embolization l4 and l5 lumbar artery. for permacath and AVF.    # Acute systolic and diastolic heart failure, improved  # NSTEMI - conservative mgmt dt renal function and anemia-bld loss  # ESRD, new HD  # Atrial flutter  # acute hypoxic resp failure, improved  # hemorrhagic shock, left RP hematoma, acute blood loss anemia  # lupus anticoagulant +  Echo TTE mod to severe LV SD, hypokinesis anterior wall, not candidate for cath, medically manage  HD per renal  10/28 sp IR embolization l4 and l5 lumbar artery, h/h stable  on midodrine during dialysis  sp permacath placement  sp L AVF - vascular removed staples but a couple remain  still with leukocytosis, afebrile, no signs of infection    # Parkinsons disease   # delirium  currently cannot discharge 2/2 behavior and agitation, risk of pulling out permacath   corrected per cardio, psych FU  on depakote 250mg BID, 125mg q8 PRN, ativan only for severe agitation  c/w trihexyphenidyl, carbidopa/levodopa   per neuro, probable early lewy body dementia related paranoia and agitation    # DM2 (diabetes mellitus, type 2)  per endo  hba1c 6.4    # CAD (coronary artery disease)  # HTN  cont lopressor  c/w atorvastatin  asa on hold    PCP Dr. Simons    DNR/DNI    dispo: dc planning to rehab - has been difficult 2/2 behaviour control and agitation - psych fu    updated son at bedside today    please call [facility] @ [phone] for questions or concerns 70yo M w/ PMHx of CAD (s/p CABG 2019), CKD (unknown stage), DM2, Parkinson's Disease, HTN, depression presents with new onset acute heart failure exacerbation, NSTEMI, started on hep gtt, developed bl RP bleed, acute anemia. sp MICU course for hemorrhagic shock, 10/28 sp IR embolization l4 and l5 lumbar artery. for permacath and AVF.    # Acute systolic and diastolic heart failure, improved  # NSTEMI - conservative mgmt dt renal function and anemia-bld loss  # ESRD, new HD  # Atrial flutter  # acute hypoxic resp failure, improved  # hemorrhagic shock, left RP hematoma, acute blood loss anemia  # lupus anticoagulant +  Echo TTE mod to severe LV SD, hypokinesis anterior wall, not candidate for cath, medically manage  10/28 sp IR embolization l4 and l5 lumbar artery, h/h stable  HD via permacath per renal, on midodrine during dialysis  sp L AVF - vascular removed staples but a couple remain, await maturation  still with leukocytosis, afebrile, no signs of infection    # Parkinsons disease   # delirium  currently cannot discharge 2/2 behavior and agitation, risk of pulling out permacath   corrected, resumed on seroquel, cont on depakote  c/w trihexyphenidyl, carbidopa/levodopa   per neuro, probable early lewy body dementia related paranoia and agitation    # DM2 (diabetes mellitus, type 2)  per endo  hba1c 6.4    # CAD (coronary artery disease)  # HTN  cont lopressor  c/w atorvastatin  asa on hold    PCP Dr. Simons    DNR/DNI    dispo: monitor on seroquel, if no improvement in MS then will ask palliative to fu again as dc planning difficult with permacath that patient may pull out    please call [facility] @ [phone] for questions or concerns

## 2021-11-29 NOTE — PROGRESS NOTE ADULT - ASSESSMENT
Assessment  DMT2: 71y Male with DM T2 with hyperglycemia, A1C 6.4%, was on insulin at home, now on low-dose basal insulin and coverage, blood sugars are trending within overall acceptable range, no hypoglycemic episode, eating meals, DC planning for rehab delayed 2/2 psych/agitation.  Hypothyroidism: Patient has no history thyroid disease, was not on any thyroid supplements, subclinical with low-normal FT4, lethargic, started on synthroid 25 mcg po daily, FT4 improved to 1.2.  CHF: on medications, stable, monitored.  HTN: Controlled,  on antihypertensive medications.  Parkinsons: on meds, monitored.  CKD: Monitor labs/BMP      Kelsie Reece MD  Cell: [phone]  Office: [phone]

## 2021-11-29 NOTE — PROGRESS NOTE ADULT - SUBJECTIVE AND OBJECTIVE BOX
SUBJECTIVE / OVERNIGHT EVENTS:    INCOMPLETE NOTE      --------------------------------------------------------------------------------------------  LABS:                        10.1   14.93 )-----------( 210      ( 29 Nov 2021 07:24 )             33.5     11-29    133<L>  |  93<L>  |  64<H>  ----------------------------<  106<H>  4.9   |  22  |  6.30<H>    Ca    9.9      29 Nov 2021 07:20        CAPILLARY BLOOD GLUCOSE      POCT Blood Glucose.: 195 mg/dL (29 Nov 2021 11:34)  POCT Blood Glucose.: 149 mg/dL (29 Nov 2021 07:43)  POCT Blood Glucose.: 176 mg/dL (28 Nov 2021 21:49)  POCT Blood Glucose.: 180 mg/dL (28 Nov 2021 16:27)            RADIOLOGY & ADDITIONAL TESTS:    Imaging Personally Reviewed:  [x] YES  [ ] NO    Consultant(s) Notes Reviewed:  [x] YES  [ ] NO    MEDICATIONS  (STANDING):  atorvastatin 80 milliGRAM(s) Oral at bedtime  carbidopa/levodopa  25/100 2.5 Tablet(s) Oral <User Schedule>  carbidopa/levodopa  25/100 2 Tablet(s) Oral <User Schedule>  chlorhexidine 4% Liquid 1 Application(s) Topical <User Schedule>  cholecalciferol 1000 Unit(s) Oral daily  dextrose 5%. 1000 milliLiter(s) (50 mL/Hr) IV Continuous <Continuous>  dextrose 5%. 1000 milliLiter(s) (100 mL/Hr) IV Continuous <Continuous>  dextrose 50% Injectable 25 Gram(s) IV Push once  dextrose 50% Injectable 12.5 Gram(s) IV Push once  dextrose 50% Injectable 25 Gram(s) IV Push once  diVALproex Sprinkle 250 milliGRAM(s) Oral two times a day  folic acid 1 milliGRAM(s) Oral daily  glucagon  Injectable 1 milliGRAM(s) IntraMuscular once  insulin glargine Injectable (LANTUS) 8 Unit(s) SubCutaneous at bedtime  insulin lispro (ADMELOG) corrective regimen sliding scale   SubCutaneous three times a day before meals  insulin lispro (ADMELOG) corrective regimen sliding scale   SubCutaneous at bedtime  levothyroxine 25 MICROGram(s) Oral daily  memantine 5 milliGRAM(s) Oral at bedtime  metoprolol tartrate 25 milliGRAM(s) Oral two times a day  midodrine 10 milliGRAM(s) Oral <User Schedule>  Nephro-celeste 1 Tablet(s) Oral daily  polyethylene glycol 3350 17 Gram(s) Oral two times a day  QUEtiapine 12.5 milliGRAM(s) Oral three times a day  senna Syrup 10 milliLiter(s) Oral at bedtime  trihexyphenidyl 2 milliGRAM(s) Oral three times a day    MEDICATIONS  (PRN):  acetaminophen     Tablet .. 650 milliGRAM(s) Oral every 6 hours PRN Mild Pain (1 - 3)  albuterol/ipratropium for Nebulization 3 milliLiter(s) Nebulizer every 6 hours PRN Shortness of Breath and/or Wheezing  diVALproex Sprinkle 125 milliGRAM(s) Oral every 8 hours PRN Agitation  guaiFENesin Oral Liquid (Sugar-Free) 200 milliGRAM(s) Oral every 6 hours PRN Cough  QUEtiapine 12.5 milliGRAM(s) Oral every 6 hours PRN agitation  sodium chloride 0.9% lock flush 10 milliLiter(s) IV Push every 1 hour PRN Pre/post blood products, medications, blood draw, and to maintain line patency      Care Discussed with Consultants/Other Providers [x] YES  [ ] NO    Vital Signs Last 24 Hrs  T(C): 36.7 (29 Nov 2021 11:38), Max: 37 (28 Nov 2021 21:42)  T(F): 98 (29 Nov 2021 11:38), Max: 98.6 (28 Nov 2021 21:42)  HR: 116 (29 Nov 2021 11:38) (95 - 116)  BP: 131/89 (29 Nov 2021 11:38) (122/69 - 138/81)  BP(mean): --  RR: 18 (29 Nov 2021 11:38) (18 - 18)  SpO2: 100% (29 Nov 2021 11:38) (98% - 100%)  I&O's Summary    28 Nov 2021 07:01  -  29 Nov 2021 07:00  --------------------------------------------------------  IN: 590 mL / OUT: 0 mL / NET: 590 mL    29 Nov 2021 07:01  -  29 Nov 2021 14:15  --------------------------------------------------------  IN: 200 mL / OUT: 0 mL / NET: 200 mL      PE:  GENERAL:  AAOx1-2  HEAD:  Atraumatic, Normocephalic  ABDOMEN: Soft, Nontender  EXTREMITIES:  2+ Peripheral Pulses, No edema, left AVF staples removed, a couple staples remain   NEURO: confused     SUBJECTIVE / OVERNIGHT EVENTS:    family memeber at bedside. pt calm. lets me examine him. however when asked questions, uses profanity and tells me to go away.    --------------------------------------------------------------------------------------------  LABS:                        10.1   14.93 )-----------( 210      ( 29 Nov 2021 07:24 )             33.5     11-29    133<L>  |  93<L>  |  64<H>  ----------------------------<  106<H>  4.9   |  22  |  6.30<H>    Ca    9.9      29 Nov 2021 07:20        CAPILLARY BLOOD GLUCOSE      POCT Blood Glucose.: 195 mg/dL (29 Nov 2021 11:34)  POCT Blood Glucose.: 149 mg/dL (29 Nov 2021 07:43)  POCT Blood Glucose.: 176 mg/dL (28 Nov 2021 21:49)  POCT Blood Glucose.: 180 mg/dL (28 Nov 2021 16:27)            RADIOLOGY & ADDITIONAL TESTS:    Imaging Personally Reviewed:  [x] YES  [ ] NO    Consultant(s) Notes Reviewed:  [x] YES  [ ] NO    MEDICATIONS  (STANDING):  atorvastatin 80 milliGRAM(s) Oral at bedtime  carbidopa/levodopa  25/100 2.5 Tablet(s) Oral <User Schedule>  carbidopa/levodopa  25/100 2 Tablet(s) Oral <User Schedule>  chlorhexidine 4% Liquid 1 Application(s) Topical <User Schedule>  cholecalciferol 1000 Unit(s) Oral daily  dextrose 5%. 1000 milliLiter(s) (50 mL/Hr) IV Continuous <Continuous>  dextrose 5%. 1000 milliLiter(s) (100 mL/Hr) IV Continuous <Continuous>  dextrose 50% Injectable 25 Gram(s) IV Push once  dextrose 50% Injectable 12.5 Gram(s) IV Push once  dextrose 50% Injectable 25 Gram(s) IV Push once  diVALproex Sprinkle 250 milliGRAM(s) Oral two times a day  folic acid 1 milliGRAM(s) Oral daily  glucagon  Injectable 1 milliGRAM(s) IntraMuscular once  insulin glargine Injectable (LANTUS) 8 Unit(s) SubCutaneous at bedtime  insulin lispro (ADMELOG) corrective regimen sliding scale   SubCutaneous three times a day before meals  insulin lispro (ADMELOG) corrective regimen sliding scale   SubCutaneous at bedtime  levothyroxine 25 MICROGram(s) Oral daily  memantine 5 milliGRAM(s) Oral at bedtime  metoprolol tartrate 25 milliGRAM(s) Oral two times a day  midodrine 10 milliGRAM(s) Oral <User Schedule>  Nephro-celeste 1 Tablet(s) Oral daily  polyethylene glycol 3350 17 Gram(s) Oral two times a day  QUEtiapine 12.5 milliGRAM(s) Oral three times a day  senna Syrup 10 milliLiter(s) Oral at bedtime  trihexyphenidyl 2 milliGRAM(s) Oral three times a day    MEDICATIONS  (PRN):  acetaminophen     Tablet .. 650 milliGRAM(s) Oral every 6 hours PRN Mild Pain (1 - 3)  albuterol/ipratropium for Nebulization 3 milliLiter(s) Nebulizer every 6 hours PRN Shortness of Breath and/or Wheezing  diVALproex Sprinkle 125 milliGRAM(s) Oral every 8 hours PRN Agitation  guaiFENesin Oral Liquid (Sugar-Free) 200 milliGRAM(s) Oral every 6 hours PRN Cough  QUEtiapine 12.5 milliGRAM(s) Oral every 6 hours PRN agitation  sodium chloride 0.9% lock flush 10 milliLiter(s) IV Push every 1 hour PRN Pre/post blood products, medications, blood draw, and to maintain line patency      Care Discussed with Consultants/Other Providers [x] YES  [ ] NO    Vital Signs Last 24 Hrs  T(C): 36.7 (29 Nov 2021 11:38), Max: 37 (28 Nov 2021 21:42)  T(F): 98 (29 Nov 2021 11:38), Max: 98.6 (28 Nov 2021 21:42)  HR: 116 (29 Nov 2021 11:38) (95 - 116)  BP: 131/89 (29 Nov 2021 11:38) (122/69 - 138/81)  BP(mean): --  RR: 18 (29 Nov 2021 11:38) (18 - 18)  SpO2: 100% (29 Nov 2021 11:38) (98% - 100%)  I&O's Summary    28 Nov 2021 07:01  -  29 Nov 2021 07:00  --------------------------------------------------------  IN: 590 mL / OUT: 0 mL / NET: 590 mL    29 Nov 2021 07:01  -  29 Nov 2021 14:15  --------------------------------------------------------  IN: 200 mL / OUT: 0 mL / NET: 200 mL      PE:  GENERAL:  AAOx1-2  HEAD:  Atraumatic, Normocephalic  CHEST: CTA right chest wall cath  ABDOMEN: Soft, Nontender  EXTREMITIES:  2+ Peripheral Pulses, No edema, left AVF staples removed, a couple staples remain   NEURO: confused

## 2021-11-29 NOTE — PROGRESS NOTE ADULT - SUBJECTIVE AND OBJECTIVE BOX
Follow-up Pulm Progress Note  Brayan Verma MD  [phone]    Stable respiratory status  Normal RA oxygen sats      Vital Signs Last 24 Hrs  T(C): 36.7 (29 Nov 2021 11:38), Max: 37 (28 Nov 2021 21:42)  T(F): 98 (29 Nov 2021 11:38), Max: 98.6 (28 Nov 2021 21:42)  HR: 116 (29 Nov 2021 11:38) (95 - 116)  BP: 131/89 (29 Nov 2021 11:38) (122/69 - 138/81)  BP(mean): --  RR: 18 (29 Nov 2021 11:38) (18 - 18)  SpO2: 100% (29 Nov 2021 11:38) (98% - 100%)                          10.1   14.93 )-----------( 210      ( 29 Nov 2021 07:24 )             33.5       11-29    133<L>  |  93<L>  |  64<H>  ----------------------------<  106<H>  4.9   |  22  |  6.30<H>    Ca    9.9      29 Nov 2021 07:20      Physical Examination:  PULM: Diminished basilar BS  CVS: Regular rate and rhythm, no murmurs, rubs, or gallops  ABD: Soft, non-tender  EXT:  No clubbing, cyanosis, or edema    RADIOLOGY REVIEWED  CXR:    CT chest:    TTE:

## 2021-11-29 NOTE — PROGRESS NOTE ADULT - SUBJECTIVE AND OBJECTIVE BOX
Chief complaint  Patient is a 71y old  Male who presents with a chief complaint of leg swelling (29 Nov 2021 14:15)   Review of systems  Patient in bed, looks comfortable, no hypoglycemic episodes.    Labs and Fingersticks  CAPILLARY BLOOD GLUCOSE      POCT Blood Glucose.: 195 mg/dL (29 Nov 2021 11:34)  POCT Blood Glucose.: 149 mg/dL (29 Nov 2021 07:43)  POCT Blood Glucose.: 176 mg/dL (28 Nov 2021 21:49)  POCT Blood Glucose.: 180 mg/dL (28 Nov 2021 16:27)    Medications  MEDICATIONS  (STANDING):  atorvastatin 80 milliGRAM(s) Oral at bedtime  carbidopa/levodopa  25/100 2 Tablet(s) Oral <User Schedule>  carbidopa/levodopa  25/100 2.5 Tablet(s) Oral <User Schedule>  chlorhexidine 4% Liquid 1 Application(s) Topical <User Schedule>  cholecalciferol 1000 Unit(s) Oral daily  dextrose 5%. 1000 milliLiter(s) (50 mL/Hr) IV Continuous <Continuous>  dextrose 5%. 1000 milliLiter(s) (100 mL/Hr) IV Continuous <Continuous>  dextrose 50% Injectable 25 Gram(s) IV Push once  dextrose 50% Injectable 25 Gram(s) IV Push once  dextrose 50% Injectable 12.5 Gram(s) IV Push once  diVALproex Sprinkle 250 milliGRAM(s) Oral two times a day  folic acid 1 milliGRAM(s) Oral daily  glucagon  Injectable 1 milliGRAM(s) IntraMuscular once  insulin glargine Injectable (LANTUS) 8 Unit(s) SubCutaneous at bedtime  insulin lispro (ADMELOG) corrective regimen sliding scale   SubCutaneous three times a day before meals  insulin lispro (ADMELOG) corrective regimen sliding scale   SubCutaneous at bedtime  levothyroxine 25 MICROGram(s) Oral daily  memantine 5 milliGRAM(s) Oral at bedtime  metoprolol tartrate 25 milliGRAM(s) Oral two times a day  midodrine 10 milliGRAM(s) Oral <User Schedule>  Nephro-celeste 1 Tablet(s) Oral daily  polyethylene glycol 3350 17 Gram(s) Oral two times a day  QUEtiapine 12.5 milliGRAM(s) Oral three times a day  senna Syrup 10 milliLiter(s) Oral at bedtime  trihexyphenidyl 2 milliGRAM(s) Oral three times a day      Physical Exam  General: Patient comfortable in bed  Vital Signs Last 12 Hrs  T(F): 98 (11-29-21 @ 11:38), Max: 98 (11-29-21 @ 11:38)  HR: 116 (11-29-21 @ 11:38) (114 - 116)  BP: 131/89 (11-29-21 @ 11:38) (131/89 - 138/81)  BP(mean): --  RR: 18 (11-29-21 @ 11:38) (18 - 18)  SpO2: 100% (11-29-21 @ 11:38) (100% - 100%)  Neck: No palpable thyroid nodules.  CVS: S1S2, No murmurs  Respiratory: No wheezing, no crepitations  GI: Abdomen soft, bowel sounds positive  Musculoskeletal:  edema lower extremities.   Skin: No skin rashes, no ecchymosis    Diagnostics    Free Thyroxine, Serum: AM Sched. Collection: 06-Nov-2021 06:00 (11-05 @ 13:18)

## 2021-11-29 NOTE — PROGRESS NOTE ADULT - PROBLEM SELECTOR PLAN 1
Will continue current insulin regimen for now. Will continue monitoring FS and FU.  Patient previously on larger dose insulin (Tresiba and Novolog), will continue monitoring and FU DC recommendations.   for compliance with consistent low carb diet.

## 2021-11-30 NOTE — PROGRESS NOTE ADULT - SUBJECTIVE AND OBJECTIVE BOX
[facility]      Seen earlier today, and examined.  - Today, patient is without complaints.           *****MEDICATIONS: Current medication reviewed and documented.    MEDICATIONS  (STANDING):  atorvastatin 80 milliGRAM(s) Oral at bedtime  carbidopa/levodopa  25/100 2.5 Tablet(s) Oral <User Schedule>  carbidopa/levodopa  25/100 2 Tablet(s) Oral <User Schedule>  chlorhexidine 4% Liquid 1 Application(s) Topical <User Schedule>  cholecalciferol 1000 Unit(s) Oral daily  dextrose 5%. 1000 milliLiter(s) (50 mL/Hr) IV Continuous <Continuous>  dextrose 5%. 1000 milliLiter(s) (100 mL/Hr) IV Continuous <Continuous>  dextrose 50% Injectable 25 Gram(s) IV Push once  dextrose 50% Injectable 12.5 Gram(s) IV Push once  dextrose 50% Injectable 25 Gram(s) IV Push once  diVALproex Sprinkle 250 milliGRAM(s) Oral two times a day  epoetin mari-epbx (RETACRIT) Injectable 4000 Unit(s) IV Push once  folic acid 1 milliGRAM(s) Oral daily  glucagon  Injectable 1 milliGRAM(s) IntraMuscular once  insulin glargine Injectable (LANTUS) 8 Unit(s) SubCutaneous at bedtime  insulin lispro (ADMELOG) corrective regimen sliding scale   SubCutaneous three times a day before meals  insulin lispro (ADMELOG) corrective regimen sliding scale   SubCutaneous at bedtime  levothyroxine 25 MICROGram(s) Oral daily  memantine 5 milliGRAM(s) Oral at bedtime  metoprolol tartrate 25 milliGRAM(s) Oral two times a day  midodrine 10 milliGRAM(s) Oral <User Schedule>  Nephro-celeste 1 Tablet(s) Oral daily  polyethylene glycol 3350 17 Gram(s) Oral two times a day  QUEtiapine 12.5 milliGRAM(s) Oral three times a day  senna Syrup 10 milliLiter(s) Oral at bedtime  trihexyphenidyl 2 milliGRAM(s) Oral three times a day    MEDICATIONS  (PRN):  acetaminophen     Tablet .. 650 milliGRAM(s) Oral every 6 hours PRN Mild Pain (1 - 3)  albuterol/ipratropium for Nebulization 3 milliLiter(s) Nebulizer every 6 hours PRN Shortness of Breath and/or Wheezing  diVALproex Sprinkle 125 milliGRAM(s) Oral every 8 hours PRN Agitation  guaiFENesin Oral Liquid (Sugar-Free) 200 milliGRAM(s) Oral every 6 hours PRN Cough  QUEtiapine 12.5 milliGRAM(s) Oral every 6 hours PRN agitation  sodium chloride 0.9% lock flush 10 milliLiter(s) IV Push every 1 hour PRN Pre/post blood products, medications, blood draw, and to maintain line patency          ***** VITAL SIGNS:  T(F): 98.8 (21 @ 10:53), Max: 98.8 (21 @ 10:53)  HR: 117 (21 @ 10:53) (84 - 120)  BP: 132/85 (21 @ 10:53) (132/85 - 156/87)  RR: 18 (21 @ 10:53) (18 - 18)  SpO2: 99% (21 @ 10:53) (99% - 100%)  Wt(kg): --  ,   I&O's Summary    2021 07:01  -  2021 07:00  --------------------------------------------------------  IN: 440 mL / OUT: 0 mL / NET: 440 mL             *****PHYSICAL EXAM:   alert oriented x 3 attention comprehension are fair.  Able to name, repeat.   EOmi fundi not visualized   no nystagmus VFF to confrontation  Tongue is midline  Palate elevates symmetrically   Moving all 4 ext spontaneously no drift appreciated    Gait not assessed.            *****LAB AND IMAGIN.9    12.41 )-----------( 214      ( 2021 06:09 )             29.0                   133<L>  |  93<L>  |  78<H>  ----------------------------<  120<H>  4.6   |  20<L>  |  7.42<H>    Ca    9.4      2021 06:09                           [All pertinent recent Imaging/Reports reviewed]           *****A S S E S S M E N T   A N D   P L A N :    72yo M w/ PMHx of CAD (s/p CABG ), CKD (unknown stage), DM2, Parkinson's Disease, HTN, depression presents with bilateral leg swelling, patient's daughter in-law at bedside Elsy Quintero ([address] [place] [address] Nurse) provides the history, the daughter reports the patient is a poor historian, unable to remember having conversations with others and likely cannot weigh risk/benefits of medical conditions, she reports that he has had bilateral lower extremity swelling for the past few months, but over the past 2 weeks it has significantly worsened, he has further difficulty ambulating due to swelling, his outpatient provider attempted to manage with 20mg lasix and then increased to 40mg lasix but the patient never took the increased dose, his symptoms ar persistent throughout the day and are extremely severe, he has developed wounds of the legs with weeping of fluid due to the swelling, she reports that the patient is frequently non-compliant with medications and medical management, he lives at home with his son and daughter in law, he denies chest pain, shortness of breath, fever/chills, cough, sputum, in the ED, he was tachycardic but hemodynamically stable, afebrile, saturating well on room air, labs were significant for elevated BNP, elevated creatinine, imaging showed moderate left pleural effusion, patient was admitted to general medicine for further management          Problem/Recommendations 1:ams of unclear etiology   likely related to multiple metabolic derangements related to uremia  sleep wake cycle disruption and poor nutritional intake  would regulate sleep wake cycle  check b12/b1 folate/tsh /ammonia wnl   thiamine 500 iv q 8 x 2 days after checking vitamin b1 level   nutrition consult for severe protein caloric malnutrition   discussed with elsy( daughter in law), agreeing to start namenda 5 mg qhs probable early lewy body dementia related paranoia and agitation   plan likely needs to adjust dose of antipsychotics, however avoid typical antipsychotics  elsy denies any alcohol intake at all.     lowered seroquel 12.5 due to sedation    over night pt did not sleep   now fell asleep as per aide at 7.15   regulate sleep wake cycle   dc am dose of seroquel   increase night time dose to regulate sleep wake cycle        more awake   conversant.   still somewhat agitated  does follow simple commands   pt able to arouse, converse    more awake, following commands   still with paranoia       Problem/Recommendations 2: weakness   r/o orthostatic hypotension   pt shaking on standing up    prob deconditioning   pt eval   oob to chair as tolerated        Problem/Recommendations 3: parkinsons   continue current regimen   sinemet 2. 5 twice a day and 2 mg qh      Thank you for allowing me to participate in the care of this patient. Will continue to follow patient periodically. Please do not hesitate to call me if you have any  questions or if there has been a change in patients neurological status     ________________  Lily Fang MD  [facility] [place] [facility] ([facility]  [phone]      33 minutes spent on total encounter; more than 50 % of the visit was  spent counseling about plan of care, compliance to diet/exercise and medication regimen and or  coordinating care by the attending physician.      It is advised that stroke patients follow up with ZACH Albarran @ [phone] in 1- 2 weeks.   Others please follow up with Dr. Michael Nissenbaum [phone]

## 2021-11-30 NOTE — PROGRESS NOTE ADULT - SUBJECTIVE AND OBJECTIVE BOX
NEPHROLOGY[facility] [phone]        Patient seen and examined in bed.  He was the same         MEDICATIONS  (STANDING):  atorvastatin 80 milliGRAM(s) Oral at bedtime  carbidopa/levodopa  25/100 2.5 Tablet(s) Oral <User Schedule>  carbidopa/levodopa  25/100 2 Tablet(s) Oral <User Schedule>  chlorhexidine 4% Liquid 1 Application(s) Topical <User Schedule>  cholecalciferol 1000 Unit(s) Oral daily  dextrose 5%. 1000 milliLiter(s) (50 mL/Hr) IV Continuous <Continuous>  dextrose 5%. 1000 milliLiter(s) (100 mL/Hr) IV Continuous <Continuous>  dextrose 50% Injectable 25 Gram(s) IV Push once  dextrose 50% Injectable 12.5 Gram(s) IV Push once  dextrose 50% Injectable 25 Gram(s) IV Push once  diVALproex Sprinkle 250 milliGRAM(s) Oral two times a day  epoetin mari-epbx (RETACRIT) Injectable 4000 Unit(s) IV Push once  folic acid 1 milliGRAM(s) Oral daily  glucagon  Injectable 1 milliGRAM(s) IntraMuscular once  insulin glargine Injectable (LANTUS) 8 Unit(s) SubCutaneous at bedtime  insulin lispro (ADMELOG) corrective regimen sliding scale   SubCutaneous three times a day before meals  insulin lispro (ADMELOG) corrective regimen sliding scale   SubCutaneous at bedtime  levothyroxine 25 MICROGram(s) Oral daily  memantine 5 milliGRAM(s) Oral at bedtime  metoprolol tartrate 25 milliGRAM(s) Oral two times a day  midodrine 10 milliGRAM(s) Oral <User Schedule>  Nephro-celeste 1 Tablet(s) Oral daily  polyethylene glycol 3350 17 Gram(s) Oral two times a day  QUEtiapine 12.5 milliGRAM(s) Oral three times a day  senna Syrup 10 milliLiter(s) Oral at bedtime  trihexyphenidyl 2 milliGRAM(s) Oral three times a day      VITAL:  T(C): , Max: 36.9 (11-29-21 @ 20:30)  T(F): , Max: 98.4 (11-29-21 @ 20:30)  HR: 120 (11-30-21 @ 04:20)  BP: 145/84 (11-30-21 @ 04:20)  BP(mean): --  RR: 18 (11-30-21 @ 04:20)  SpO2: 99% (11-30-21 @ 04:20)  Wt(kg): --    I and O's:    11-29 @ 07:01  -  11-30 @ 07:00  --------------------------------------------------------  IN: 440 mL / OUT: 0 mL / NET: 440 mL          PHYSICAL EXAM:    Constitutional: NAD  Neck:  No JVD  Respiratory: CTAB/L  Cardiovascular: S1 and S2  Gastrointestinal: BS+, soft, NT/ND  Extremities: No peripheral edema  Neurological: A/O x 3, no focal deficits  Psychiatric: Normal mood, normal affect  : No Javier  Skin: No rashes  Access: avg; perm     LABS:                        8.9    12.41 )-----------( 214      ( 30 Nov 2021 06:09 )             29.0     11-30    133<L>  |  93<L>  |  78<H>  ----------------------------<  120<H>  4.6   |  20<L>  |  7.42<H>    Ca    9.4      30 Nov 2021 06:09            Urine Studies:          RADIOLOGY & ADDITIONAL STUDIES:

## 2021-11-30 NOTE — PROGRESS NOTE ADULT - SUBJECTIVE AND OBJECTIVE BOX
SUBJECTIVE / OVERNIGHT EVENTS:    INCOMPLETE NOTE      --------------------------------------------------------------------------------------------  LABS:                        8.9    12.41 )-----------( 214      ( 30 Nov 2021 06:09 )             29.0     11-30    133<L>  |  93<L>  |  78<H>  ----------------------------<  120<H>  4.6   |  20<L>  |  7.42<H>    Ca    9.4      30 Nov 2021 06:09        CAPILLARY BLOOD GLUCOSE      POCT Blood Glucose.: 166 mg/dL (30 Nov 2021 11:37)  POCT Blood Glucose.: 238 mg/dL (30 Nov 2021 07:38)  POCT Blood Glucose.: 112 mg/dL (29 Nov 2021 22:40)  POCT Blood Glucose.: 113 mg/dL (29 Nov 2021 21:33)  POCT Blood Glucose.: 172 mg/dL (29 Nov 2021 16:58)            RADIOLOGY & ADDITIONAL TESTS:    Imaging Personally Reviewed:  [x] YES  [ ] NO    Consultant(s) Notes Reviewed:  [x] YES  [ ] NO    MEDICATIONS  (STANDING):  atorvastatin 80 milliGRAM(s) Oral at bedtime  carbidopa/levodopa  25/100 2.5 Tablet(s) Oral <User Schedule>  carbidopa/levodopa  25/100 2 Tablet(s) Oral <User Schedule>  chlorhexidine 4% Liquid 1 Application(s) Topical <User Schedule>  cholecalciferol 1000 Unit(s) Oral daily  dextrose 5%. 1000 milliLiter(s) (50 mL/Hr) IV Continuous <Continuous>  dextrose 5%. 1000 milliLiter(s) (100 mL/Hr) IV Continuous <Continuous>  dextrose 50% Injectable 25 Gram(s) IV Push once  dextrose 50% Injectable 12.5 Gram(s) IV Push once  dextrose 50% Injectable 25 Gram(s) IV Push once  diVALproex Sprinkle 250 milliGRAM(s) Oral two times a day  epoetin mari-epbx (RETACRIT) Injectable 4000 Unit(s) IV Push once  folic acid 1 milliGRAM(s) Oral daily  glucagon  Injectable 1 milliGRAM(s) IntraMuscular once  insulin glargine Injectable (LANTUS) 8 Unit(s) SubCutaneous at bedtime  insulin lispro (ADMELOG) corrective regimen sliding scale   SubCutaneous three times a day before meals  insulin lispro (ADMELOG) corrective regimen sliding scale   SubCutaneous at bedtime  levothyroxine 25 MICROGram(s) Oral daily  memantine 5 milliGRAM(s) Oral at bedtime  metoprolol tartrate 25 milliGRAM(s) Oral two times a day  midodrine 10 milliGRAM(s) Oral <User Schedule>  Nephro-celeste 1 Tablet(s) Oral daily  polyethylene glycol 3350 17 Gram(s) Oral two times a day  QUEtiapine 12.5 milliGRAM(s) Oral three times a day  senna Syrup 10 milliLiter(s) Oral at bedtime  trihexyphenidyl 2 milliGRAM(s) Oral three times a day    MEDICATIONS  (PRN):  acetaminophen     Tablet .. 650 milliGRAM(s) Oral every 6 hours PRN Mild Pain (1 - 3)  albuterol/ipratropium for Nebulization 3 milliLiter(s) Nebulizer every 6 hours PRN Shortness of Breath and/or Wheezing  diVALproex Sprinkle 125 milliGRAM(s) Oral every 8 hours PRN Agitation  guaiFENesin Oral Liquid (Sugar-Free) 200 milliGRAM(s) Oral every 6 hours PRN Cough  QUEtiapine 12.5 milliGRAM(s) Oral every 6 hours PRN agitation  sodium chloride 0.9% lock flush 10 milliLiter(s) IV Push every 1 hour PRN Pre/post blood products, medications, blood draw, and to maintain line patency      Care Discussed with Consultants/Other Providers [x] YES  [ ] NO    Vital Signs Last 24 Hrs  T(C): 37.1 (30 Nov 2021 10:53), Max: 37.1 (30 Nov 2021 10:53)  T(F): 98.8 (30 Nov 2021 10:53), Max: 98.8 (30 Nov 2021 10:53)  HR: 117 (30 Nov 2021 10:53) (84 - 120)  BP: 132/85 (30 Nov 2021 10:53) (132/85 - 156/87)  BP(mean): --  RR: 18 (30 Nov 2021 10:53) (18 - 18)  SpO2: 99% (30 Nov 2021 10:53) (99% - 100%)  I&O's Summary    29 Nov 2021 07:01  -  30 Nov 2021 07:00  --------------------------------------------------------  IN: 500 mL / OUT: 0 mL / NET: 500 mL    30 Nov 2021 07:01  -  30 Nov 2021 14:36  --------------------------------------------------------  IN: 130 mL / OUT: 0 mL / NET: 130 mL      PE:  GENERAL:  AAOx1-2  HEAD:  Atraumatic, Normocephalic  CHEST: CTA right chest wall cath  ABDOMEN: Soft, Nontender  EXTREMITIES:  2+ Peripheral Pulses, No edema, left AVF staples removed, a couple staples remain   NEURO: confused     SUBJECTIVE / OVERNIGHT EVENTS:    co pain all over. more calm today. answering questions. confused at times and mumbles incoherently. no profanity.    --------------------------------------------------------------------------------------------  LABS:                        8.9    12.41 )-----------( 214      ( 30 Nov 2021 06:09 )             29.0     11-30    133<L>  |  93<L>  |  78<H>  ----------------------------<  120<H>  4.6   |  20<L>  |  7.42<H>    Ca    9.4      30 Nov 2021 06:09        CAPILLARY BLOOD GLUCOSE      POCT Blood Glucose.: 166 mg/dL (30 Nov 2021 11:37)  POCT Blood Glucose.: 238 mg/dL (30 Nov 2021 07:38)  POCT Blood Glucose.: 112 mg/dL (29 Nov 2021 22:40)  POCT Blood Glucose.: 113 mg/dL (29 Nov 2021 21:33)  POCT Blood Glucose.: 172 mg/dL (29 Nov 2021 16:58)            RADIOLOGY & ADDITIONAL TESTS:    Imaging Personally Reviewed:  [x] YES  [ ] NO    Consultant(s) Notes Reviewed:  [x] YES  [ ] NO    MEDICATIONS  (STANDING):  atorvastatin 80 milliGRAM(s) Oral at bedtime  carbidopa/levodopa  25/100 2.5 Tablet(s) Oral <User Schedule>  carbidopa/levodopa  25/100 2 Tablet(s) Oral <User Schedule>  chlorhexidine 4% Liquid 1 Application(s) Topical <User Schedule>  cholecalciferol 1000 Unit(s) Oral daily  dextrose 5%. 1000 milliLiter(s) (50 mL/Hr) IV Continuous <Continuous>  dextrose 5%. 1000 milliLiter(s) (100 mL/Hr) IV Continuous <Continuous>  dextrose 50% Injectable 25 Gram(s) IV Push once  dextrose 50% Injectable 12.5 Gram(s) IV Push once  dextrose 50% Injectable 25 Gram(s) IV Push once  diVALproex Sprinkle 250 milliGRAM(s) Oral two times a day  epoetin mari-epbx (RETACRIT) Injectable 4000 Unit(s) IV Push once  folic acid 1 milliGRAM(s) Oral daily  glucagon  Injectable 1 milliGRAM(s) IntraMuscular once  insulin glargine Injectable (LANTUS) 8 Unit(s) SubCutaneous at bedtime  insulin lispro (ADMELOG) corrective regimen sliding scale   SubCutaneous three times a day before meals  insulin lispro (ADMELOG) corrective regimen sliding scale   SubCutaneous at bedtime  levothyroxine 25 MICROGram(s) Oral daily  memantine 5 milliGRAM(s) Oral at bedtime  metoprolol tartrate 25 milliGRAM(s) Oral two times a day  midodrine 10 milliGRAM(s) Oral <User Schedule>  Nephro-celeste 1 Tablet(s) Oral daily  polyethylene glycol 3350 17 Gram(s) Oral two times a day  QUEtiapine 12.5 milliGRAM(s) Oral three times a day  senna Syrup 10 milliLiter(s) Oral at bedtime  trihexyphenidyl 2 milliGRAM(s) Oral three times a day    MEDICATIONS  (PRN):  acetaminophen     Tablet .. 650 milliGRAM(s) Oral every 6 hours PRN Mild Pain (1 - 3)  albuterol/ipratropium for Nebulization 3 milliLiter(s) Nebulizer every 6 hours PRN Shortness of Breath and/or Wheezing  diVALproex Sprinkle 125 milliGRAM(s) Oral every 8 hours PRN Agitation  guaiFENesin Oral Liquid (Sugar-Free) 200 milliGRAM(s) Oral every 6 hours PRN Cough  QUEtiapine 12.5 milliGRAM(s) Oral every 6 hours PRN agitation  sodium chloride 0.9% lock flush 10 milliLiter(s) IV Push every 1 hour PRN Pre/post blood products, medications, blood draw, and to maintain line patency      Care Discussed with Consultants/Other Providers [x] YES  [ ] NO    Vital Signs Last 24 Hrs  T(C): 37.1 (30 Nov 2021 10:53), Max: 37.1 (30 Nov 2021 10:53)  T(F): 98.8 (30 Nov 2021 10:53), Max: 98.8 (30 Nov 2021 10:53)  HR: 117 (30 Nov 2021 10:53) (84 - 120)  BP: 132/85 (30 Nov 2021 10:53) (132/85 - 156/87)  BP(mean): --  RR: 18 (30 Nov 2021 10:53) (18 - 18)  SpO2: 99% (30 Nov 2021 10:53) (99% - 100%)  I&O's Summary    29 Nov 2021 07:01  -  30 Nov 2021 07:00  --------------------------------------------------------  IN: 500 mL / OUT: 0 mL / NET: 500 mL    30 Nov 2021 07:01  -  30 Nov 2021 14:36  --------------------------------------------------------  IN: 130 mL / OUT: 0 mL / NET: 130 mL      PE:  GENERAL:  AAOx1-2  HEAD:  Atraumatic, Normocephalic  CHEST: CTA right chest wall cath  ABDOMEN: Soft, Nontender  EXTREMITIES:  2+ Peripheral Pulses, No edema, left AVF staples removed, a couple staples remain   NEURO: confused

## 2021-11-30 NOTE — PROGRESS NOTE ADULT - ASSESSMENT
70yo M w/ PMHx of CAD (s/p CABG 2019), CKD (unknown stage), DM2, Parkinson's Disease, HTN, depression presents with new onset acute heart failure exacerbation, NSTEMI, started on hep gtt, developed bl RP bleed, acute anemia. sp MICU course for hemorrhagic shock, 10/28 sp IR embolization l4 and l5 lumbar artery. for permacath and AVF.    # Acute systolic and diastolic heart failure, improved  # NSTEMI - conservative mgmt dt renal function and anemia-bld loss  # ESRD, new HD  # Atrial flutter  # acute hypoxic resp failure, improved  # hemorrhagic shock, left RP hematoma, acute blood loss anemia  # lupus anticoagulant +  Echo TTE mod to severe LV SD, hypokinesis anterior wall, not candidate for cath, medically manage  10/28 sp IR embolization l4 and l5 lumbar artery, h/h stable  HD via permacath per renal, on midodrine during dialysis  sp L AVF - vascular removed staples but a couple remain, await maturation  still with leukocytosis, afebrile, no signs of infection    # Parkinsons disease   # delirium  currently cannot discharge 2/2 behavior and agitation, risk of pulling out permacath   corrected, resumed on seroquel, cont on depakote  c/w trihexyphenidyl, carbidopa/levodopa   per neuro, probable early lewy body dementia related paranoia and agitation    # DM2 (diabetes mellitus, type 2)  per endo  hba1c 6.4    # CAD (coronary artery disease)  # HTN  cont lopressor  c/w atorvastatin  asa on hold    PCP Dr. Simons    DNR/DNI    dispo: monitor on seroquel, if no improvement in MS then will ask palliative to fu again as dc planning difficult with permacath that patient may pull out    please call [facility] @ [phone] for questions or concerns 70yo M w/ PMHx of CAD (s/p CABG 2019), CKD (unknown stage), DM2, Parkinson's Disease, HTN, depression presents with new onset acute heart failure exacerbation, NSTEMI, started on hep gtt, developed bl RP bleed, acute anemia. sp MICU course for hemorrhagic shock, 10/28 sp IR embolization l4 and l5 lumbar artery. for permacath and AVF.    # Acute systolic and diastolic heart failure, improved  # NSTEMI - conservative mgmt dt renal function and anemia-bld loss  # ESRD, new HD  # Atrial flutter  # acute hypoxic resp failure, improved  # hemorrhagic shock, left RP hematoma, acute blood loss anemia  # lupus anticoagulant +  Echo TTE mod to severe LV SD, hypokinesis anterior wall, not candidate for cath, medically manage  10/28 sp IR embolization l4 and l5 lumbar artery, h/h stable  HD via permacath per renal, on midodrine during dialysis  sp L AVF - vascular removed staples but a couple remain, await maturation    # Parkinsons disease   # delirium  currently cannot discharge 2/2 behavior and agitation, risk of pulling out permacath   corrected, resumed on seroquel, cont on depakote  c/w trihexyphenidyl, carbidopa/levodopa   per neuro, probable early lewy body dementia related paranoia and agitation    # DM2 (diabetes mellitus, type 2)  per endo  hba1c 6.4    # CAD (coronary artery disease)  # HTN  cont lopressor  c/w atorvastatin  asa on hold    PCP Dr. Simons    DNR/DNI    dispo: AMS improved on Seroquel however, there still remains the concern that patient would be agitated and pulls out permacath at outside facility, will need palliative to fu re goals of care     Dr. Pineda will take over care tomorrow.  Please contact with any questions or concerns [phone].

## 2021-11-30 NOTE — PROGRESS NOTE ADULT - ASSESSMENT
Assessment  DMT2: 71y Male with DM T2 with hyperglycemia, A1C 6.4%, was on insulin at home, now on low-dose basal insulin and coverage, blood sugars are trending within overall acceptable range, no hypoglycemic episode. Patient is eating meals, no acute events, DC planning for rehab delayed 2/2 psych/agitation and risk for pulling out permacath.  Hypothyroidism: Patient has no history thyroid disease, was not on any thyroid supplements, subclinical with low-normal FT4, lethargic, started on synthroid 25 mcg po daily, FT4 improved to 1.2.  CHF: on medications, stable, monitored.  HTN: Controlled,  on antihypertensive medications.  Parkinsons: on meds, monitored.  CKD: Monitor labs/BMP      Kelsie Reece MD  Cell: [phone]  Office: [phone]     Assessment  DMT2: 71y Male with DM T2 with hyperglycemia, A1C 6.4%, was on insulin at home, now on low-dose basal insulin and coverage, blood sugars are trending within overall acceptable range, no  hypoglycemic episode. Patient is eating meals, no acute events, DC planning for rehab delayed 2/2 psych/agitation and risk for pulling out permacath.  Hypothyroidism: Patient has no history thyroid disease, was not on any thyroid supplements, subclinical with low-normal FT4, lethargic, started on synthroid 25 mcg po daily, FT4 improved to 1.2.  CHF: on medications, stable, monitored.  HTN: Controlled,  on antihypertensive medications.  Parkinsons: on meds, monitored.  CKD: Monitor labs/BMP      Kelsie Reece MD  Cell: [phone]  Office: [phone]

## 2021-11-30 NOTE — PROGRESS NOTE ADULT - SUBJECTIVE AND OBJECTIVE BOX
Follow-up Pulm Progress Note  Brayan Verma MD  [phone]    Sleeping comfortably on RA  No new resp issues      Vital Signs Last 24 Hrs  T(C): 37.1 (30 Nov 2021 10:53), Max: 37.1 (30 Nov 2021 10:53)  T(F): 98.8 (30 Nov 2021 10:53), Max: 98.8 (30 Nov 2021 10:53)  HR: 117 (30 Nov 2021 10:53) (84 - 120)  BP: 132/85 (30 Nov 2021 10:53) (132/85 - 156/87)  BP(mean): --  RR: 18 (30 Nov 2021 10:53) (18 - 18)  SpO2: 99% (30 Nov 2021 10:53) (99% - 100%)                        8.9    12.41 )-----------( 214      ( 30 Nov 2021 06:09 )             29.0       11-30    133<L>  |  93<L>  |  78<H>  ----------------------------<  120<H>  4.6   |  20<L>  |  7.42<H>    Ca    9.4      30 Nov 2021 06:09      Physical Examination:  PULM: Diminished basilar BS  CVS: Regular rate and rhythm, no murmurs, rubs, or gallops  ABD: Soft, non-tender  EXT:  No clubbing, cyanosis, or edema    RADIOLOGY REVIEWED  CXR:    CT chest:    TTE:

## 2021-11-30 NOTE — PROGRESS NOTE ADULT - SUBJECTIVE AND OBJECTIVE BOX
Chief complaint  Patient is a 71y old  Male who presents with a chief complaint of leg swelling (30 Nov 2021 14:36)   Review of systems  Patient in bed, looks comfortable, no hypoglycemic episodes.    Labs and Fingersticks  CAPILLARY BLOOD GLUCOSE      POCT Blood Glucose.: 166 mg/dL (30 Nov 2021 11:37)  POCT Blood Glucose.: 238 mg/dL (30 Nov 2021 07:38)  POCT Blood Glucose.: 112 mg/dL (29 Nov 2021 22:40)  POCT Blood Glucose.: 113 mg/dL (29 Nov 2021 21:33)  POCT Blood Glucose.: 172 mg/dL (29 Nov 2021 16:58)      Anion Gap, Serum: 20 *H* (11-30 @ 06:09)  Anion Gap, Serum: 18 *H* (11-29 @ 07:20)      Calcium, Total Serum: 9.4 (11-30 @ 06:09)  Calcium, Total Serum: 9.9 (11-29 @ 07:20)          11-30    133<L>  |  93<L>  |  78<H>  ----------------------------<  120<H>  4.6   |  20<L>  |  7.42<H>    Ca    9.4      30 Nov 2021 06:09                          8.9    12.41 )-----------( 214      ( 30 Nov 2021 06:09 )             29.0     Medications  MEDICATIONS  (STANDING):  atorvastatin 80 milliGRAM(s) Oral at bedtime  carbidopa/levodopa  25/100 2.5 Tablet(s) Oral <User Schedule>  carbidopa/levodopa  25/100 2 Tablet(s) Oral <User Schedule>  chlorhexidine 4% Liquid 1 Application(s) Topical <User Schedule>  cholecalciferol 1000 Unit(s) Oral daily  dextrose 5%. 1000 milliLiter(s) (50 mL/Hr) IV Continuous <Continuous>  dextrose 5%. 1000 milliLiter(s) (100 mL/Hr) IV Continuous <Continuous>  dextrose 50% Injectable 25 Gram(s) IV Push once  dextrose 50% Injectable 12.5 Gram(s) IV Push once  dextrose 50% Injectable 25 Gram(s) IV Push once  diVALproex Sprinkle 250 milliGRAM(s) Oral two times a day  epoetin mari-epbx (RETACRIT) Injectable 4000 Unit(s) IV Push once  folic acid 1 milliGRAM(s) Oral daily  glucagon  Injectable 1 milliGRAM(s) IntraMuscular once  insulin glargine Injectable (LANTUS) 8 Unit(s) SubCutaneous at bedtime  insulin lispro (ADMELOG) corrective regimen sliding scale   SubCutaneous three times a day before meals  insulin lispro (ADMELOG) corrective regimen sliding scale   SubCutaneous at bedtime  levothyroxine 25 MICROGram(s) Oral daily  memantine 5 milliGRAM(s) Oral at bedtime  metoprolol tartrate 25 milliGRAM(s) Oral two times a day  midodrine 10 milliGRAM(s) Oral <User Schedule>  Nephro-celeste 1 Tablet(s) Oral daily  polyethylene glycol 3350 17 Gram(s) Oral two times a day  QUEtiapine 12.5 milliGRAM(s) Oral three times a day  senna Syrup 10 milliLiter(s) Oral at bedtime  trihexyphenidyl 2 milliGRAM(s) Oral three times a day      Physical Exam  General: Patient comfortable in bed  Vital Signs Last 12 Hrs  T(F): 98.8 (11-30-21 @ 10:53), Max: 98.8 (11-30-21 @ 10:53)  HR: 117 (11-30-21 @ 10:53) (117 - 120)  BP: 132/85 (11-30-21 @ 10:53) (132/85 - 145/84)  BP(mean): --  RR: 18 (11-30-21 @ 10:53) (18 - 18)  SpO2: 99% (11-30-21 @ 10:53) (99% - 99%)  Neck: No palpable thyroid nodules.  CVS: S1S2, No murmurs  Respiratory: No wheezing, no crepitations  GI: Abdomen soft, bowel sounds positive  Musculoskeletal:  edema lower extremities.   Skin: No skin rashes, no ecchymosis    Diagnostics    Free Thyroxine, Serum: AM Sched. Collection: 06-Nov-2021 06:00 (11-05 @ 13:18)           Chief complaint  Patient is a 71y old  Male who presents with a chief complaint of leg swelling (30 Nov 2021 14:36)   Review of systems  Patient in bed, looks comfortable, no hypoglycemic episodes.    Labs and Fingersticks  CAPILLARY BLOOD GLUCOSE      POCT Blood Glucose.: 166 mg/dL (30 Nov 2021 11:37)  POCT Blood Glucose.: 238 mg/dL (30 Nov 2021 07:38)  POCT Blood Glucose.: 112 mg/dL (29 Nov 2021 22:40)  POCT Blood Glucose.: 113 mg/dL (29 Nov 2021 21:33)  POCT Blood Glucose.: 172 mg/dL (29 Nov 2021 16:58)      Anion Gap, Serum: 20 *H* (11-30 @ 06:09)  Anion Gap, Serum: 18 *H* (11-29 @ 07:20)      Calcium, Total Serum: 9.4 (11-30 @ 06:09)  Calcium, Total Serum: 9.9 (11-29 @ 07:20)          11-30    133<L>  |  93<L>  |  78<H>  ----------------------------<  120<H>  4.6   |  20<L>  |  7.42<H>    Ca    9.4      30 Nov 2021 06:09                          8.9    12.41 )-----------( 214      ( 30 Nov 2021 06:09 )             29.0     Medications  MEDICATIONS  (STANDING):  atorvastatin 80 milliGRAM(s) Oral at bedtime  carbidopa/levodopa  25/100 2.5 Tablet(s) Oral <User Schedule>  carbidopa/levodopa  25/100 2 Tablet(s) Oral <User Schedule>  chlorhexidine 4% Liquid 1 Application(s) Topical <User Schedule>  cholecalciferol 1000 Unit(s) Oral daily  dextrose 5%. 1000 milliLiter(s) (50 mL/Hr) IV Continuous <Continuous>  dextrose 5%. 1000 milliLiter(s) (100 mL/Hr) IV Continuous <Continuous>  dextrose 50% Injectable 25 Gram(s) IV Push once  dextrose 50% Injectable 12.5 Gram(s) IV Push once  dextrose 50% Injectable 25 Gram(s) IV Push once  diVALproex Sprinkle 250 milliGRAM(s) Oral two times a day  epoetin mari-epbx (RETACRIT) Injectable 4000 Unit(s) IV Push once  folic acid 1 milliGRAM(s) Oral daily  glucagon  Injectable 1 milliGRAM(s) IntraMuscular once  insulin glargine Injectable (LANTUS) 8 Unit(s) SubCutaneous at bedtime  insulin lispro (ADMELOG) corrective regimen sliding scale   SubCutaneous three times a day before meals  insulin lispro (ADMELOG) corrective regimen sliding scale   SubCutaneous at bedtime  levothyroxine 25 MICROGram(s) Oral daily  memantine 5 milliGRAM(s) Oral at bedtime  metoprolol tartrate 25 milliGRAM(s) Oral two times a day  midodrine 10 milliGRAM(s) Oral <User Schedule>  Nephro-celeste 1 Tablet(s) Oral daily  polyethylene glycol 3350 17 Gram(s) Oral two times a day  QUEtiapine 12.5 milliGRAM(s) Oral three times a day  senna Syrup 10 milliLiter(s) Oral at bedtime  trihexyphenidyl 2 milliGRAM(s) Oral three times a day      Physical Exam  General: Patient comfortable in bed  Vital Signs Last 12 Hrs  T(F): 98.8 (11-30-21 @ 10:53), Max: 98.8 (11-30-21 @ 10:53)  HR: 117 (11-30-21 @ 10:53) (117 - 120)  BP: 132/85 (11-30-21 @ 10:53) (132/85 - 145/84)  BP(mean): --  RR: 18 (11-30-21 @ 10:53) (18 - 18)  SpO2: 99% (11-30-21 @ 10:53) (99% - 99%)  Neck: No palpable thyroid nodules.  CVS: S1S2, No murmurs  Respiratory: No wheezing, no crepitations  GI: Abdomen soft, bowel sounds positive  Musculoskeletal:  edema lower extremities.   Skin: No skin rashes, no ecchymosis    Diagnostics    Free Thyroxine, Serum: AM Sched. Collection: 06-Nov-2021 06:00 (11-05 @ 13:18)

## 2021-11-30 NOTE — PROGRESS NOTE ADULT - ASSESSMENT
70yo M w/ PMHx of CAD (s/p CABG 2019), CKD (unknown stage), DM2, Parkinson's Disease, HTN, depression presents with bilateral leg swelling  ESRD   Severe LV dysfunction ;  A flutter   Confusion -         1 IR- Tried to re orient pt and tell him not to pull on the perm cath     2 Renal-  Next HD today     3 CVS- BP controlled at present, on Midodrine (with hold parameters in place, SBP> 160),  Lopressor for heart rate control     4 Anemia -  Retacrit 4k units with  HD     5 Vasc - s/p  LUE AVF  and the sutures were removed     Behavior is the rate limiting step preventing dc at present.         [facility]   [phone]

## 2021-12-01 NOTE — PROGRESS NOTE ADULT - SUBJECTIVE AND OBJECTIVE BOX
[facility] [phone]        Patient seen and examined in bed.  He was the same         MEDICATIONS  (STANDING):  atorvastatin 80 milliGRAM(s) Oral at bedtime  carbidopa/levodopa  25/100 2.5 Tablet(s) Oral <User Schedule>  carbidopa/levodopa  25/100 2 Tablet(s) Oral <User Schedule>  chlorhexidine 4% Liquid 1 Application(s) Topical <User Schedule>  cholecalciferol 1000 Unit(s) Oral daily  dextrose 5%. 1000 milliLiter(s) (50 mL/Hr) IV Continuous <Continuous>  dextrose 5%. 1000 milliLiter(s) (100 mL/Hr) IV Continuous <Continuous>  dextrose 50% Injectable 25 Gram(s) IV Push once  dextrose 50% Injectable 12.5 Gram(s) IV Push once  dextrose 50% Injectable 25 Gram(s) IV Push once  diVALproex Sprinkle 250 milliGRAM(s) Oral two times a day  folic acid 1 milliGRAM(s) Oral daily  glucagon  Injectable 1 milliGRAM(s) IntraMuscular once  insulin glargine Injectable (LANTUS) 8 Unit(s) SubCutaneous at bedtime  insulin lispro (ADMELOG) corrective regimen sliding scale   SubCutaneous three times a day before meals  insulin lispro (ADMELOG) corrective regimen sliding scale   SubCutaneous at bedtime  levothyroxine 25 MICROGram(s) Oral daily  memantine 5 milliGRAM(s) Oral at bedtime  metoprolol tartrate 25 milliGRAM(s) Oral two times a day  midodrine 10 milliGRAM(s) Oral <User Schedule>  Nephro-celeste 1 Tablet(s) Oral daily  polyethylene glycol 3350 17 Gram(s) Oral two times a day  QUEtiapine 12.5 milliGRAM(s) Oral three times a day  senna Syrup 10 milliLiter(s) Oral at bedtime  trihexyphenidyl 2 milliGRAM(s) Oral three times a day      VITAL:  T(C): , Max: 37.1 (11-30-21 @ 10:53)  T(F): , Max: 98.8 (11-30-21 @ 10:53)  HR: 113 (12-01-21 @ 04:55)  BP: 136/75 (12-01-21 @ 04:55)  BP(mean): --  RR: 18 (12-01-21 @ 04:55)  SpO2: 99% (12-01-21 @ 04:55)  Wt(kg): --    I and O's:    11-30 @ 07:01  -  12-01 @ 07:00  --------------------------------------------------------  IN: 1010 mL / OUT: 2275 mL / NET: -1265 mL          PHYSICAL EXAM:    Constitutional: NAD  Neck:  No JVD  Respiratory: CTAB/L  Cardiovascular: S1 and S2  Gastrointestinal: BS+, soft, NT/ND  Extremities: No peripheral edema  Neurological:  no focal deficits  Psychiatric: Normal mood, normal affect  : No Javier  Skin: No rashes  Access: Not applicable    LABS:                        8.3    12.27 )-----------( 196      ( 01 Dec 2021 06:16 )             28.0     12-01    133<L>  |  96  |  44<H>  ----------------------------<  100<H>  4.5   |  20<L>  |  4.61<H>    Ca    9.4      01 Dec 2021 06:16            Urine Studies:          RADIOLOGY & ADDITIONAL STUDIES:

## 2021-12-01 NOTE — PROGRESS NOTE ADULT - ASSESSMENT
70yo M w/ PMHx of CAD (s/p CABG 2019), CKD (unknown stage), DM2, Parkinson's Disease, HTN, depression presents with new onset acute heart failure exacerbation, NSTEMI, started on hep gtt, developed bl RP bleed, acute anemia. sp MICU course for hemorrhagic shock, 10/28 sp IR embolization l4 and l5 lumbar artery. for permacath and AVF.    # Acute systolic and diastolic heart failure, improved  # NSTEMI - conservative mgmt dt renal function and anemia-bld loss  # ESRD, new HD  # Atrial flutter  # acute hypoxic resp failure, improved  # hemorrhagic shock, left RP hematoma, acute blood loss anemia  # lupus anticoagulant +  Echo TTE mod to severe LV SD, hypokinesis anterior wall, not candidate for cath, medically manage  10/28 sp IR embolization l4 and l5 lumbar artery, h/h stable  HD via permacath per renal, on midodrine during dialysis  sp L AVF - vascular removed staples but a couple remain, await maturation    # Parkinsons disease   # delirium  currently cannot discharge 2/2 behavior and agitation, risk of pulling out permacath   corrected, resumed on seroquel, cont on depakote  c/w trihexyphenidyl, carbidopa/levodopa   per neuro, probable early lewy body dementia related paranoia and agitation    # DM2 (diabetes mellitus, type 2)  per endo  hba1c 6.4    # CAD (coronary artery disease)  # HTN  cont lopressor  c/w atorvastatin  asa on hold  Midodrine pre-dialysis     PCP Dr. Simons    DNR/DNI    dispo: AMS improved on Seroquel however, there still remains the concern that patient would be agitated and pulls out permacath at outside facility, will need palliative to fu re goals of care

## 2021-12-01 NOTE — PROGRESS NOTE ADULT - SUBJECTIVE AND OBJECTIVE BOX
SUBJECTIVE / OVERNIGHT EVENTS:    + agitation + restless this AM and overnight as per staff  currently resting comfortably in bed    --------------------------------------------------------------------------------------------  LABS:                        8.3    12.27 )-----------( 196      ( 01 Dec 2021 06:16 )             28.0     12-01    133<L>  |  96  |  44<H>  ----------------------------<  100<H>  4.5   |  20<L>  |  4.61<H>    Ca    9.4      01 Dec 2021 06:16        CAPILLARY BLOOD GLUCOSE      POCT Blood Glucose.: 109 mg/dL (01 Dec 2021 07:52)  POCT Blood Glucose.: 248 mg/dL (30 Nov 2021 21:21)  POCT Blood Glucose.: 240 mg/dL (30 Nov 2021 20:13)  POCT Blood Glucose.: 146 mg/dL (30 Nov 2021 16:32)            RADIOLOGY & ADDITIONAL TESTS:    Imaging Personally Reviewed:  [x] YES  [ ] NO    Consultant(s) Notes Reviewed:  [x] YES  [ ] NO    MEDICATIONS  (STANDING):  atorvastatin 80 milliGRAM(s) Oral at bedtime  carbidopa/levodopa  25/100 2.5 Tablet(s) Oral <User Schedule>  carbidopa/levodopa  25/100 2 Tablet(s) Oral <User Schedule>  chlorhexidine 4% Liquid 1 Application(s) Topical <User Schedule>  cholecalciferol 1000 Unit(s) Oral daily  dextrose 5%. 1000 milliLiter(s) (50 mL/Hr) IV Continuous <Continuous>  dextrose 5%. 1000 milliLiter(s) (100 mL/Hr) IV Continuous <Continuous>  dextrose 50% Injectable 25 Gram(s) IV Push once  dextrose 50% Injectable 12.5 Gram(s) IV Push once  dextrose 50% Injectable 25 Gram(s) IV Push once  diVALproex Sprinkle 250 milliGRAM(s) Oral two times a day  folic acid 1 milliGRAM(s) Oral daily  glucagon  Injectable 1 milliGRAM(s) IntraMuscular once  insulin glargine Injectable (LANTUS) 8 Unit(s) SubCutaneous at bedtime  insulin lispro (ADMELOG) corrective regimen sliding scale   SubCutaneous three times a day before meals  insulin lispro (ADMELOG) corrective regimen sliding scale   SubCutaneous at bedtime  levothyroxine 25 MICROGram(s) Oral daily  memantine 5 milliGRAM(s) Oral at bedtime  metoprolol tartrate 25 milliGRAM(s) Oral two times a day  midodrine 10 milliGRAM(s) Oral <User Schedule>  Nephro-celeste 1 Tablet(s) Oral daily  polyethylene glycol 3350 17 Gram(s) Oral two times a day  QUEtiapine 12.5 milliGRAM(s) Oral three times a day  senna Syrup 10 milliLiter(s) Oral at bedtime  trihexyphenidyl 2 milliGRAM(s) Oral three times a day    MEDICATIONS  (PRN):  acetaminophen     Tablet .. 650 milliGRAM(s) Oral every 6 hours PRN Mild Pain (1 - 3)  albuterol/ipratropium for Nebulization 3 milliLiter(s) Nebulizer every 6 hours PRN Shortness of Breath and/or Wheezing  diVALproex Sprinkle 125 milliGRAM(s) Oral every 8 hours PRN Agitation  guaiFENesin Oral Liquid (Sugar-Free) 200 milliGRAM(s) Oral every 6 hours PRN Cough  QUEtiapine 12.5 milliGRAM(s) Oral every 6 hours PRN agitation  sodium chloride 0.9% lock flush 10 milliLiter(s) IV Push every 1 hour PRN Pre/post blood products, medications, blood draw, and to maintain line patency      Care Discussed with Consultants/Other Providers [x] YES  [ ] NO    Vital Signs Last 24 Hrs  T(C): 36.7 (01 Dec 2021 11:13), Max: 37.1 (30 Nov 2021 17:47)  T(F): 98 (01 Dec 2021 11:13), Max: 98.8 (30 Nov 2021 17:47)  HR: 106 (01 Dec 2021 11:13) (98 - 116)  BP: 117/70 (01 Dec 2021 11:13) (117/70 - 136/75)  BP(mean): --  RR: 18 (01 Dec 2021 11:13) (18 - 18)  SpO2: 98% (01 Dec 2021 11:13) (98% - 100%)  I&O's Summary    30 Nov 2021 07:01  -  01 Dec 2021 07:00  --------------------------------------------------------  IN: 1010 mL / OUT: 2275 mL / NET: -1265 mL    PHYSICAL EXAM:  GENERAL: NAD, well-developed, comfortable, confused   HEAD:  Atraumatic, Normocephalic  EYES: EOMI, PERRLA, conjunctiva and sclera clear  NECK: Supple, No JVD  CHEST/LUNG: Clear to auscultation bilaterally, right chest wall cath  HEART: Regular rate and rhythm; No murmurs, rubs, or gallops  ABDOMEN: Soft, Nontender, Nondistended; Bowel sounds present  NEURO: AAOx1-2, no focal weakness, 5/5 b/l extremity strength, b/l knee no arthritis, no effusion   EXTREMITIES:  2+ Peripheral Pulses, No edema, left AVF staples removed, a couple staples remain   SKIN: No rashes or lesions

## 2021-12-01 NOTE — PROGRESS NOTE ADULT - ASSESSMENT
Assessment  DMT2: 71y Male with DM T2 with hyperglycemia, A1C 6.4%, was on insulin at home, now on low-dose basal insulin and coverage, blood sugars are trending within overall acceptable range with intermittent postprandial elevations, no hypoglycemias. Patient is eating meals, no acute events, DC planning for rehab delayed 2/2 psych/agitation and risk for pulling out permacath.  Hypothyroidism: Patient has no history thyroid disease, was not on any thyroid supplements, subclinical with low-normal FT4, lethargic, started on synthroid 25 mcg po daily, FT4 improved to 1.2.  CHF: on medications, stable, monitored.  HTN: Controlled,  on antihypertensive medications.  Parkinsons: on meds, monitored.  CKD: Monitor labs/BMP      Kelsie Reece MD  Cell: [phone]  Office: [phone]     Assessment  DMT2: 71y Male with DM T2 with hyperglycemia, A1C 6.4%, was on insulin at home, now on low-dose  basal insulin and coverage, blood sugars are trending within overall acceptable range with intermittent postprandial elevations, no hypoglycemias. Patient is eating meals, no acute events, DC planning for rehab delayed 2/2 psych/agitation and risk for pulling out permacath.  Hypothyroidism: Patient has no history thyroid disease, was not on any thyroid supplements, subclinical with low-normal FT4, lethargic, started on synthroid 25 mcg po daily, FT4 improved to 1.2.  CHF: on medications, stable, monitored.  HTN: Controlled,  on antihypertensive medications.  Parkinsons: on meds, monitored.  CKD: Monitor labs/BMP      Kelsie Reece MD  Cell: [phone]  Office: [phone]

## 2021-12-01 NOTE — PROGRESS NOTE ADULT - ASSESSMENT
72yo M w/ PMHx of CAD (s/p CABG 2019), CKD (unknown stage), DM2, Parkinson's Disease, HTN, depression presents with bilateral leg swelling  ESRD   Severe LV dysfunction ;  A flutter   Confusion -         1 IR- Tried to re orient pt and tell him not to pull on the perm cath     2 Renal-  Next HD in am     3 CVS- BP controlled at present, on Midodrine (with hold parameters in place, SBP> 160),  Lopressor for heart rate control     4 Anemia -  Retacrit 4k units with  HD     5 Vasc - s/p  LUE AVF  and the sutures were removed     Behavior is the rate limiting step preventing dc at present.         Sayed [facility]   [phone]

## 2021-12-01 NOTE — PROGRESS NOTE ADULT - PROBLEM SELECTOR PLAN 1
Will continue current insulin regimen for now. Will continue monitoring FS and FU.  Patient previously on large-dose basal bolus insulin. Suggest DC on current inpatient insulin regimen, endo FU 4 weeks.   for compliance with consistent low carb diet.

## 2021-12-01 NOTE — PROGRESS NOTE ADULT - SUBJECTIVE AND OBJECTIVE BOX
[facility]      Seen earlier today, and examined.  - Today, patient is without complaints.           *****MEDICATIONS: Current medication reviewed and documented.    MEDICATIONS  (STANDING):  atorvastatin 80 milliGRAM(s) Oral at bedtime  carbidopa/levodopa  25/100 2.5 Tablet(s) Oral <User Schedule>  carbidopa/levodopa  25/100 2 Tablet(s) Oral <User Schedule>  chlorhexidine 4% Liquid 1 Application(s) Topical <User Schedule>  cholecalciferol 1000 Unit(s) Oral daily  dextrose 5%. 1000 milliLiter(s) (50 mL/Hr) IV Continuous <Continuous>  dextrose 5%. 1000 milliLiter(s) (100 mL/Hr) IV Continuous <Continuous>  dextrose 50% Injectable 25 Gram(s) IV Push once  dextrose 50% Injectable 12.5 Gram(s) IV Push once  dextrose 50% Injectable 25 Gram(s) IV Push once  diVALproex Sprinkle 250 milliGRAM(s) Oral two times a day  folic acid 1 milliGRAM(s) Oral daily  glucagon  Injectable 1 milliGRAM(s) IntraMuscular once  insulin glargine Injectable (LANTUS) 8 Unit(s) SubCutaneous at bedtime  insulin lispro (ADMELOG) corrective regimen sliding scale   SubCutaneous three times a day before meals  insulin lispro (ADMELOG) corrective regimen sliding scale   SubCutaneous at bedtime  levothyroxine 25 MICROGram(s) Oral daily  memantine 5 milliGRAM(s) Oral at bedtime  metoprolol tartrate 25 milliGRAM(s) Oral two times a day  midodrine 10 milliGRAM(s) Oral <User Schedule>  Nephro-celeste 1 Tablet(s) Oral daily  polyethylene glycol 3350 17 Gram(s) Oral two times a day  QUEtiapine 12.5 milliGRAM(s) Oral three times a day  senna Syrup 10 milliLiter(s) Oral at bedtime  trihexyphenidyl 2 milliGRAM(s) Oral three times a day    MEDICATIONS  (PRN):  acetaminophen     Tablet .. 650 milliGRAM(s) Oral every 6 hours PRN Mild Pain (1 - 3)  albuterol/ipratropium for Nebulization 3 milliLiter(s) Nebulizer every 6 hours PRN Shortness of Breath and/or Wheezing  diVALproex Sprinkle 125 milliGRAM(s) Oral every 8 hours PRN Agitation  guaiFENesin Oral Liquid (Sugar-Free) 200 milliGRAM(s) Oral every 6 hours PRN Cough  QUEtiapine 12.5 milliGRAM(s) Oral every 6 hours PRN agitation  sodium chloride 0.9% lock flush 10 milliLiter(s) IV Push every 1 hour PRN Pre/post blood products, medications, blood draw, and to maintain line patency          ***** VITAL SIGNS:  T(F): 98 (21 @ 11:13), Max: 98.8 (21 @ 17:47)  HR: 106 (21 @ 11:13) (98 - 116)  BP: 117/70 (21 @ 11:13) (117/70 - 136/75)  RR: 18 (21 @ 11:13) (18 - 18)  SpO2: 98% (21 @ 11:13) (98% - 100%)  Wt(kg): --  ,   I&O's Summary    2021 07:01  -  01 Dec 2021 07:00  --------------------------------------------------------  IN: 1010 mL / OUT: 2275 mL / NET: -1265 mL    01 Dec 2021 07:01  -  01 Dec 2021 16:01  --------------------------------------------------------  IN: 100 mL / OUT: 0 mL / NET: 100 mL             *****PHYSICAL EXAM:   alert oriented x 2 somewhat agitated, but reorientable   limited interaction as pt is not fully cooperative   sleepy falls asleep   EOmi  blinks to threat   Tongue is midline     Moving all 4 ext spontaneously antigravity     Gait not assessed.            *****LAB AND IMAGIN.3    12.27 )-----------( 196      ( 01 Dec 2021 06:16 )             28.0               12    133<L>  |  96  |  44<H>  ----------------------------<  100<H>  4.5   |  20<L>  |  4.61<H>    Ca    9.4      01 Dec 2021 06:16                           [All pertinent recent Imaging/Reports reviewed]           *****A S S E S S M E N T   A N D   P L A N :    70yo M w/ PMHx of CAD (s/p CABG ), CKD (unknown stage), DM2, Parkinson's Disease, HTN, depression presents with bilateral leg swelling, patient's daughter in-law at bedside Elsy Quintero ([address] [place] [address] Nurse) provides the history, the daughter reports the patient is a poor historian, unable to remember having conversations with others and likely cannot weigh risk/benefits of medical conditions, she reports that he has had bilateral lower extremity swelling for the past few months, but over the past 2 weeks it has significantly worsened, he has further difficulty ambulating due to swelling, his outpatient provider attempted to manage with 20mg lasix and then increased to 40mg lasix but the patient never took the increased dose, his symptoms ar persistent throughout the day and are extremely severe, he has developed wounds of the legs with weeping of fluid due to the swelling, she reports that the patient is frequently non-compliant with medications and medical management, he lives at home with his son and daughter in law, he denies chest pain, shortness of breath, fever/chills, cough, sputum, in the ED, he was tachycardic but hemodynamically stable, afebrile, saturating well on room air, labs were significant for elevated BNP, elevated creatinine, imaging showed moderate left pleural effusion, patient was admitted to general medicine for further management          Problem/Recommendations 1:ams of unclear etiology   likely related to multiple metabolic derangements related to uremia  sleep wake cycle disruption and poor nutritional intake  would regulate sleep wake cycle  check b12/b1 folate/tsh /ammonia wnl   thiamine 500 iv q 8 x 2 days after checking vitamin b1 level   nutrition consult for severe protein caloric malnutrition   discussed with elsy( daughter in law), agreeing to start namenda 5 mg qhs probable early lewy body dementia related paranoia and agitation   plan likely needs to adjust dose of antipsychotics, however avoid typical antipsychotics  elsy denies any alcohol intake at all.     lowered seroquel 12.5 due to sedation    over night pt did not sleep   now fell asleep as per aide at 7.15   regulate sleep wake cycle   dc am dose of seroquel   increase night time dose to regulate sleep wake cycle        more awake   conversant.   still somewhat agitated  does follow simple commands   pt able to arouse, converse    more awake, following commands   still with paranoia       Problem/Recommendations 2: weakness   r/o orthostatic hypotension   pt shaking on standing up    prob deconditioning   pt eval   oob to chair as tolerated        Problem/Recommendations 3: parkinsons   continue current regimen   sinemet 2. 5 twice a day and 2 mg qh        Thank you for allowing me to participate in the care of this patient. Will continue to follow patient periodically. Please do not hesitate to call me if you have any  questions or if there has been a change in patients neurological status     ________________  Lily Fang MD  [facility] ([facility]  [phone]      33 minutes spent on total encounter; more than 50 % of the visit was  spent counseling about plan of care, compliance to diet/exercise and medication regimen and or  coordinating care by the attending physician.      It is advised that stroke patients follow up with ZACH Albarran @ [phone] in 1- 2 weeks.   Others please follow up with Dr. Michael Nissenbaum [phone]

## 2021-12-01 NOTE — CHART NOTE - NSCHARTNOTEFT_GEN_A_CORE
Nutrition Follow Up Note  Patient seen for: malnutrition follow-up. Chart reviewed, events noted.    "70yo M w/ PMHx of CAD (s/p CABG ), CKD (unknown stage), DM2, Parkinson's Disease, HTN, depression presents with new onset acute heart failure exacerbation, NSTEMI, started on hep gtt, developed bl RP bleed, acute anemia. sp MICU course for hemorrhagic shock, 10/28 sp IR embolization l4 and l5 lumbar artery. for permacath and AVF."    Source: [] Patient       [x] Medical Record        [x] RN        [] Family at bedside       [] Other:    -If unable to interview patient: [] Trach/Vent/BiPAP  [x] Disoriented/confused/inappropriate to interview    Diet Order:   Diet, Regular:   Supplement Feeding Modality:  Oral  Nepro Cans or Servings Per Day:  1       Frequency:  Daily (-)    - Is current order appropriate/adequate? [x] Yes  []  No:     - PO intake :   [] >75%  Adequate    [] 50-75%  Fair       [x] <50%  Poor    - Nutrition-related concerns:      - Per RN at bedside, pt with ~50% intake. Flowsheets indicate 20-75% intake in the last week      - Per chart, pt in wrist restraints today for agitation.      - Per chart, pt ordered for Nephro-celeste, Vitamin D3, and folic acid.      - Per chart, pt blood glucose being managed with sliding scale insulin and lantus    GI: No nausea, vomiting, constipation, diarrhea noted in chart or per RN.  Last BM  per chart.   Bowel Regimen? [x] Yes (miralax, senna)  [] No      Weights:   Daily Weight in k (), 77.9 (), 76.2 (), 77.9 (-), 79.5 (-), 78.4 (-), 80 (-)  Wt fluctuations likely 2/2 fluid shifts, pt on HD. RD to continue to monitor wt trends as able.    MEDICATIONS  (STANDING):  atorvastatin 80 milliGRAM(s) Oral at bedtime  carbidopa/levodopa  25/100 2.5 Tablet(s) Oral <User Schedule>  carbidopa/levodopa  25/100 2 Tablet(s) Oral <User Schedule>  chlorhexidine 4% Liquid 1 Application(s) Topical <User Schedule>  cholecalciferol 1000 Unit(s) Oral daily  dextrose 5%. 1000 milliLiter(s) (100 mL/Hr) IV Continuous <Continuous>  dextrose 5%. 1000 milliLiter(s) (50 mL/Hr) IV Continuous <Continuous>  dextrose 50% Injectable 25 Gram(s) IV Push once  dextrose 50% Injectable 12.5 Gram(s) IV Push once  dextrose 50% Injectable 25 Gram(s) IV Push once  diVALproex Sprinkle 250 milliGRAM(s) Oral two times a day  folic acid 1 milliGRAM(s) Oral daily  glucagon  Injectable 1 milliGRAM(s) IntraMuscular once  insulin glargine Injectable (LANTUS) 8 Unit(s) SubCutaneous at bedtime  insulin lispro (ADMELOG) corrective regimen sliding scale   SubCutaneous three times a day before meals  insulin lispro (ADMELOG) corrective regimen sliding scale   SubCutaneous at bedtime  levothyroxine 25 MICROGram(s) Oral daily  memantine 5 milliGRAM(s) Oral at bedtime  metoprolol tartrate 25 milliGRAM(s) Oral two times a day  midodrine 10 milliGRAM(s) Oral <User Schedule>  Nephro-celeste 1 Tablet(s) Oral daily  polyethylene glycol 3350 17 Gram(s) Oral two times a day  QUEtiapine 12.5 milliGRAM(s) Oral three times a day  senna Syrup 10 milliLiter(s) Oral at bedtime  trihexyphenidyl 2 milliGRAM(s) Oral three times a day    MEDICATIONS  (PRN):  acetaminophen     Tablet .. 650 milliGRAM(s) Oral every 6 hours PRN Mild Pain (1 - 3)  albuterol/ipratropium for Nebulization 3 milliLiter(s) Nebulizer every 6 hours PRN Shortness of Breath and/or Wheezing  diVALproex Sprinkle 125 milliGRAM(s) Oral every 8 hours PRN Agitation  guaiFENesin Oral Liquid (Sugar-Free) 200 milliGRAM(s) Oral every 6 hours PRN Cough  QUEtiapine 12.5 milliGRAM(s) Oral every 6 hours PRN agitation  sodium chloride 0.9% lock flush 10 milliLiter(s) IV Push every 1 hour PRN Pre/post blood products, medications, blood draw, and to maintain line patency      Pertinent Labs:    @ 06:16: Na 133<L>, BUN 44<H>, Cr 4.61<H>, <H>, K+ 4.5  : Phos 4.5      A1C with Estimated Average Glucose Result: 6.4 % (10-22-21 @ 08:56)    Finger Sticks:  POCT Blood Glucose.: 109 mg/dL ( @ 07:52)  POCT Blood Glucose.: 248 mg/dL ( @ 21:21)  POCT Blood Glucose.: 240 mg/dL ( @ 20:13)  POCT Blood Glucose.: 146 mg/dL ( @ 16:32)  POCT Blood Glucose.: 166 mg/dL ( @ 11:37)      Skin per nursing documentation: No pressure injuries noted.  Edema per nursing documentation: +1 left arm    Estimated Needs:   [x] no change since previous assessment  Estimated Energy Needs: 5859-5571 kcal (30-35 kcal/kg)  Estimated Protein Needs:  gm protein (1.2-1.4 g/kg)  Defer fluid needs to team  based on IBW 78 kg    Previous Nutrition Diagnosis: Increased Nutrient Needs, Food and Nutrition Related Knowledge Deficit, Severe acute on chronic malnutrition  Nutrition Diagnosis is: [x] ongoing, addressed with micronutrient supplementation, previously on EN feeds, now transitioned to PO diet, and oral nutrition supplements    Nutrition Care Plan:  [x] In Progress  [] Achieved  [] Not applicable    New Nutrition Diagnosis: [x] Not applicable    Nutrition Interventions:     Education Provided   [] Yes:  [x] No: Pt with AMS, not appropriate      Recommendations:      1) Continue PO Regular Diet as medically appropriate; defer texture to SLP/Team  2) Continue Nephro-celeste supplementation to help meet nutrient needs  3) Continue Nepro 1x/day to optimize nutritional intake  4) Please continue to assist with meals as able - pt in wrist restraints at this time  5) Nutrition plan of care to be in line with medical GOC and pt/family wishes   6) Continue to check phosphorus, sodium, potassium, and fingersticks, and address any abnormal lab values as appropriate      Monitoring and Evaluation:   Continue to monitor nutritional intake, tolerance to diet prescription, weights, labs, skin integrity    RD remains available upon request and will follow up per protocol  Maribel Gifford RDN, CDN Pager #[phone]

## 2021-12-01 NOTE — PROGRESS NOTE ADULT - SUBJECTIVE AND OBJECTIVE BOX
Chief complaint  Patient is a 71y old  Male who presents with a chief complaint of leg swelling (01 Dec 2021 11:49)   Review of systems  Patient in bed, looks comfortable, no hypoglycemic episodes.    Labs and Fingersticks  CAPILLARY BLOOD GLUCOSE      POCT Blood Glucose.: 109 mg/dL (01 Dec 2021 12:14)  POCT Blood Glucose.: 109 mg/dL (01 Dec 2021 07:52)  POCT Blood Glucose.: 248 mg/dL (30 Nov 2021 21:21)  POCT Blood Glucose.: 240 mg/dL (30 Nov 2021 20:13)  POCT Blood Glucose.: 146 mg/dL (30 Nov 2021 16:32)      Anion Gap, Serum: 17 (12-01 @ 06:16)  Anion Gap, Serum: 20 *H* (11-30 @ 06:09)      Calcium, Total Serum: 9.4 (12-01 @ 06:16)  Calcium, Total Serum: 9.4 (11-30 @ 06:09)          12-01    133<L>  |  96  |  44<H>  ----------------------------<  100<H>  4.5   |  20<L>  |  4.61<H>    Ca    9.4      01 Dec 2021 06:16                          8.3    12.27 )-----------( 196      ( 01 Dec 2021 06:16 )             28.0     Medications  MEDICATIONS  (STANDING):  atorvastatin 80 milliGRAM(s) Oral at bedtime  carbidopa/levodopa  25/100 2.5 Tablet(s) Oral <User Schedule>  carbidopa/levodopa  25/100 2 Tablet(s) Oral <User Schedule>  chlorhexidine 4% Liquid 1 Application(s) Topical <User Schedule>  cholecalciferol 1000 Unit(s) Oral daily  dextrose 5%. 1000 milliLiter(s) (50 mL/Hr) IV Continuous <Continuous>  dextrose 5%. 1000 milliLiter(s) (100 mL/Hr) IV Continuous <Continuous>  dextrose 50% Injectable 25 Gram(s) IV Push once  dextrose 50% Injectable 12.5 Gram(s) IV Push once  dextrose 50% Injectable 25 Gram(s) IV Push once  diVALproex Sprinkle 250 milliGRAM(s) Oral two times a day  folic acid 1 milliGRAM(s) Oral daily  glucagon  Injectable 1 milliGRAM(s) IntraMuscular once  insulin glargine Injectable (LANTUS) 8 Unit(s) SubCutaneous at bedtime  insulin lispro (ADMELOG) corrective regimen sliding scale   SubCutaneous three times a day before meals  insulin lispro (ADMELOG) corrective regimen sliding scale   SubCutaneous at bedtime  levothyroxine 25 MICROGram(s) Oral daily  memantine 5 milliGRAM(s) Oral at bedtime  metoprolol tartrate 25 milliGRAM(s) Oral two times a day  midodrine 10 milliGRAM(s) Oral <User Schedule>  Nephro-celeste 1 Tablet(s) Oral daily  polyethylene glycol 3350 17 Gram(s) Oral two times a day  QUEtiapine 12.5 milliGRAM(s) Oral three times a day  senna Syrup 10 milliLiter(s) Oral at bedtime  trihexyphenidyl 2 milliGRAM(s) Oral three times a day      Physical Exam  General: Patient comfortable in bed  Vital Signs Last 12 Hrs  T(F): 98 (12-01-21 @ 11:13), Max: 98 (12-01-21 @ 11:13)  HR: 106 (12-01-21 @ 11:13) (106 - 113)  BP: 117/70 (12-01-21 @ 11:13) (117/70 - 136/75)  BP(mean): --  RR: 18 (12-01-21 @ 11:13) (18 - 18)  SpO2: 98% (12-01-21 @ 11:13) (98% - 99%)  Neck: No palpable thyroid nodules.  CVS: S1S2, No murmurs  Respiratory: No wheezing, no crepitations  GI: Abdomen soft, bowel sounds positive  Musculoskeletal:  edema lower extremities.   Skin: No skin rashes, no ecchymosis    Diagnostics    Free Thyroxine, Serum: AM Sched. Collection: 06-Nov-2021 06:00 (11-05 @ 13:18)           Chief complaint  Patient is a 71y old  Male who presents with a chief complaint of leg swelling (01 Dec 2021 11:49)   Review of systems  Patient in bed, looks comfortable, no hypoglycemic episodes.    Labs and Fingersticks  CAPILLARY BLOOD GLUCOSE      POCT Blood Glucose.: 109 mg/dL (01 Dec 2021 12:14)  POCT Blood Glucose.: 109 mg/dL (01 Dec 2021 07:52)  POCT Blood Glucose.: 248 mg/dL (30 Nov 2021 21:21)  POCT Blood Glucose.: 240 mg/dL (30 Nov 2021 20:13)  POCT Blood Glucose.: 146 mg/dL (30 Nov 2021 16:32)      Anion Gap, Serum: 17 (12-01 @ 06:16)  Anion Gap, Serum: 20 *H* (11-30 @ 06:09)      Calcium, Total Serum: 9.4 (12-01 @ 06:16)  Calcium, Total Serum: 9.4 (11-30 @ 06:09)          12-01    133<L>  |  96  |  44<H>  ----------------------------<  100<H>  4.5   |  20<L>  |  4.61<H>    Ca    9.4      01 Dec 2021 06:16                          8.3    12.27 )-----------( 196      ( 01 Dec 2021 06:16 )             28.0     Medications  MEDICATIONS  (STANDING):  atorvastatin 80 milliGRAM(s) Oral at bedtime  carbidopa/levodopa  25/100 2.5 Tablet(s) Oral <User Schedule>  carbidopa/levodopa  25/100 2 Tablet(s) Oral <User Schedule>  chlorhexidine 4% Liquid 1 Application(s) Topical <User Schedule>  cholecalciferol 1000 Unit(s) Oral daily  dextrose 5%. 1000 milliLiter(s) (50 mL/Hr) IV Continuous <Continuous>  dextrose 5%. 1000 milliLiter(s) (100 mL/Hr) IV Continuous <Continuous>  dextrose 50% Injectable 25 Gram(s) IV Push once  dextrose 50% Injectable 12.5 Gram(s) IV Push once  dextrose 50% Injectable 25 Gram(s) IV Push once  diVALproex Sprinkle 250 milliGRAM(s) Oral two times a day  folic acid 1 milliGRAM(s) Oral daily  glucagon  Injectable 1 milliGRAM(s) IntraMuscular once  insulin glargine Injectable (LANTUS) 8 Unit(s) SubCutaneous at bedtime  insulin lispro (ADMELOG) corrective regimen sliding scale   SubCutaneous three times a day before meals  insulin lispro (ADMELOG) corrective regimen sliding scale   SubCutaneous at bedtime  levothyroxine 25 MICROGram(s) Oral daily  memantine 5 milliGRAM(s) Oral at bedtime  metoprolol tartrate 25 milliGRAM(s) Oral two times a day  midodrine 10 milliGRAM(s) Oral <User Schedule>  Nephro-celeste 1 Tablet(s) Oral daily  polyethylene glycol 3350 17 Gram(s) Oral two times a day  QUEtiapine 12.5 milliGRAM(s) Oral three times a day  senna Syrup 10 milliLiter(s) Oral at bedtime  trihexyphenidyl 2 milliGRAM(s) Oral three times a day      Physical Exam  General: Patient comfortable in bed  Vital Signs Last 12 Hrs  T(F): 98 (12-01-21 @ 11:13), Max: 98 (12-01-21 @ 11:13)  HR: 106 (12-01-21 @ 11:13) (106 - 113)  BP: 117/70 (12-01-21 @ 11:13) (117/70 - 136/75)  BP(mean): --  RR: 18 (12-01-21 @ 11:13) (18 - 18)  SpO2: 98% (12-01-21 @ 11:13) (98% - 99%)  Neck: No palpable thyroid nodules.  CVS: S1S2, No murmurs  Respiratory: No wheezing, no crepitations  GI: Abdomen soft, bowel sounds positive  Musculoskeletal:  edema lower extremities.   Skin: No skin rashes, no ecchymosis    Diagnostics    Free Thyroxine, Serum: AM Sched. Collection: 06-Nov-2021 06:00 (11-05 @ 13:18)

## 2021-12-02 NOTE — PROGRESS NOTE ADULT - SUBJECTIVE AND OBJECTIVE BOX
[facility]      Seen earlier today, and examined.  - Today, patient is without complaints.           *****MEDICATIONS: Current medication reviewed and documented.    MEDICATIONS  (STANDING):  atorvastatin 80 milliGRAM(s) Oral at bedtime  carbidopa/levodopa  25/100 2.5 Tablet(s) Oral <User Schedule>  carbidopa/levodopa  25/100 2 Tablet(s) Oral <User Schedule>  chlorhexidine 4% Liquid 1 Application(s) Topical <User Schedule>  cholecalciferol 1000 Unit(s) Oral daily  dextrose 5%. 1000 milliLiter(s) (50 mL/Hr) IV Continuous <Continuous>  dextrose 5%. 1000 milliLiter(s) (100 mL/Hr) IV Continuous <Continuous>  dextrose 50% Injectable 25 Gram(s) IV Push once  dextrose 50% Injectable 12.5 Gram(s) IV Push once  dextrose 50% Injectable 25 Gram(s) IV Push once  diVALproex Sprinkle 250 milliGRAM(s) Oral two times a day  epoetin mari-epbx (RETACRIT) Injectable 00357 Unit(s) IV Push once  folic acid 1 milliGRAM(s) Oral daily  glucagon  Injectable 1 milliGRAM(s) IntraMuscular once  insulin glargine Injectable (LANTUS) 8 Unit(s) SubCutaneous at bedtime  insulin lispro (ADMELOG) corrective regimen sliding scale   SubCutaneous three times a day before meals  insulin lispro (ADMELOG) corrective regimen sliding scale   SubCutaneous at bedtime  levothyroxine 25 MICROGram(s) Oral daily  memantine 5 milliGRAM(s) Oral at bedtime  metoprolol tartrate 25 milliGRAM(s) Oral two times a day  midodrine 10 milliGRAM(s) Oral <User Schedule>  Nephro-celeste 1 Tablet(s) Oral daily  polyethylene glycol 3350 17 Gram(s) Oral two times a day  QUEtiapine 12.5 milliGRAM(s) Oral three times a day  senna Syrup 10 milliLiter(s) Oral at bedtime  trihexyphenidyl 2 milliGRAM(s) Oral three times a day    MEDICATIONS  (PRN):  acetaminophen     Tablet .. 650 milliGRAM(s) Oral every 6 hours PRN Mild Pain (1 - 3)  albuterol/ipratropium for Nebulization 3 milliLiter(s) Nebulizer every 6 hours PRN Shortness of Breath and/or Wheezing  diVALproex Sprinkle 125 milliGRAM(s) Oral every 8 hours PRN Agitation  guaiFENesin Oral Liquid (Sugar-Free) 200 milliGRAM(s) Oral every 6 hours PRN Cough  QUEtiapine 12.5 milliGRAM(s) Oral every 6 hours PRN agitation  sodium chloride 0.9% lock flush 10 milliLiter(s) IV Push every 1 hour PRN Pre/post blood products, medications, blood draw, and to maintain line patency          ***** VITAL SIGNS:  T(F): 97.8 (21 @ 10:54), Max: 97.8 (21 @ 19:57)  HR: 116 (21 @ 10:54) (98 - 118)  BP: 120/77 (21 @ 10:54) (117/71 - 129/82)  RR: 18 (21 @ 10:54) (18 - 18)  SpO2: 99% (21 @ 10:54) (97% - 99%)  Wt(kg): --  ,   I&O's Summary    01 Dec 2021 07:01  -  02 Dec 2021 07:00  --------------------------------------------------------  IN: 300 mL / OUT: 0 mL / NET: 300 mL    02 Dec 2021 07:  -  02 Dec 2021 16:53  --------------------------------------------------------  IN: 840 mL / OUT: 0 mL / NET: 840 mL             *****PHYSICAL EXAM:    alert oriented x 2 somewhat agitated, but reorientable   limited interaction as pt is not fully cooperative   sleepy falls asleep   EOmi  blinks to threat   Tongue is midline     Moving all 4 ext spontaneously antigravity     Gait not assessed.        *****LAB AND IMAGIN.1    8.45  )-----------( 219      ( 02 Dec 2021 05:37 )             30.6               12-02    134<L>  |  94<L>  |  64<H>  ----------------------------<  123<H>  4.8   |  22  |  6.31<H>    Ca    9.7      02 Dec 2021 05:37       [All pertinent recent Imaging/Reports reviewed]           *****A S S E S S M E N T   A N D   P L A N :c71yo M w/ PMHx of CAD (s/p CABG ), CKD (unknown stage), DM2, Parkinson's Disease, HTN, depression presents with bilateral leg swelling, patient's daughter in-law at bedside Elsy Quintero ([address] [place] [address] Nurse) provides the history, the daughter reports the patient is a poor historian, unable to remember having conversations with others and likely cannot weigh risk/benefits of medical conditions, she reports that he has had bilateral lower extremity swelling for the past few months, but over the past 2 weeks it has significantly worsened, he has further difficulty ambulating due to swelling, his outpatient provider attempted to manage with 20mg lasix and then increased to 40mg lasix but the patient never took the increased dose, his symptoms ar persistent throughout the day and are extremely severe, he has developed wounds of the legs with weeping of fluid due to the swelling, she reports that the patient is frequently non-compliant with medications and medical management, he lives at home with his son and daughter in law, he denies chest pain, shortness of breath, fever/chills, cough, sputum, in the ED, he was tachycardic but hemodynamically stable, afebrile, saturating well on room air, labs were significant for elevated BNP, elevated creatinine, imaging showed moderate left pleural effusion, patient was admitted to general medicine for further management          Problem/Recommendations 1:ams of unclear etiology   likely related to multiple metabolic derangements related to uremia  sleep wake cycle disruption and poor nutritional intake  would regulate sleep wake cycle  check b12/b1 folate/tsh /ammonia wnl   thiamine 500 iv q 8 x 2 days after checking vitamin b1 level   nutrition consult for severe protein caloric malnutrition   discussed with elsy( daughter in law), agreeing to start namenda 5 mg qhs probable early lewy body dementia related paranoia and agitation   plan likely needs to adjust dose of antipsychotics, however avoid typical antipsychotics  elsy denies any alcohol intake at all.     lowered seroquel 12.5 due to sedation    over night pt did not sleep   now fell asleep as per aide at 7.15   regulate sleep wake cycle   dc am dose of seroquel   increase night time dose to regulate sleep wake cycle        more awake   conversant.   still somewhat agitated  does follow simple commands   pt able to arouse, converse    more awake, following commands   still with paranoia       Problem/Recommendations 2: weakness   r/o orthostatic hypotension   pt shaking on standing up    prob deconditioning   pt eval   oob to chair as tolerated        Problem/Recommendations 3: parkinsons   continue current regimen   sinemet 2. 5 twice a day and 2 mg qh          Thank you for allowing me to participate in the care of this patient. Will continue to follow patient periodically. Please do not hesitate to call me if you have any  questions or if there has been a change in patients neurological status     ________________  Lily Fang MD  [facility] [place] ([facility]  [phone]      33 minutes spent on total encounter; more than 50 % of the visit was  spent counseling about plan of care, compliance to diet/exercise and medication regimen and or  coordinating care by the attending physician.      It is advised that stroke patients follow up with NP Kelsey Albarran @ [phone] in 1- 2 weeks.   Others please follow up with Dr. Michael Nissenbaum [phone]

## 2021-12-02 NOTE — PROGRESS NOTE ADULT - SUBJECTIVE AND OBJECTIVE BOX
NEPHROLOGY-[facility] [phone]        Patient seen and examined in bed.  He was about the same but seemed more alert         MEDICATIONS  (STANDING):  atorvastatin 80 milliGRAM(s) Oral at bedtime  carbidopa/levodopa  25/100 2.5 Tablet(s) Oral <User Schedule>  carbidopa/levodopa  25/100 2 Tablet(s) Oral <User Schedule>  chlorhexidine 4% Liquid 1 Application(s) Topical <User Schedule>  cholecalciferol 1000 Unit(s) Oral daily  dextrose 5%. 1000 milliLiter(s) (50 mL/Hr) IV Continuous <Continuous>  dextrose 5%. 1000 milliLiter(s) (100 mL/Hr) IV Continuous <Continuous>  dextrose 50% Injectable 25 Gram(s) IV Push once  dextrose 50% Injectable 12.5 Gram(s) IV Push once  dextrose 50% Injectable 25 Gram(s) IV Push once  diVALproex Sprinkle 250 milliGRAM(s) Oral two times a day  folic acid 1 milliGRAM(s) Oral daily  glucagon  Injectable 1 milliGRAM(s) IntraMuscular once  insulin glargine Injectable (LANTUS) 8 Unit(s) SubCutaneous at bedtime  insulin lispro (ADMELOG) corrective regimen sliding scale   SubCutaneous three times a day before meals  insulin lispro (ADMELOG) corrective regimen sliding scale   SubCutaneous at bedtime  levothyroxine 25 MICROGram(s) Oral daily  memantine 5 milliGRAM(s) Oral at bedtime  metoprolol tartrate 25 milliGRAM(s) Oral two times a day  midodrine 10 milliGRAM(s) Oral <User Schedule>  Nephro-celeste 1 Tablet(s) Oral daily  polyethylene glycol 3350 17 Gram(s) Oral two times a day  QUEtiapine 12.5 milliGRAM(s) Oral three times a day  senna Syrup 10 milliLiter(s) Oral at bedtime  trihexyphenidyl 2 milliGRAM(s) Oral three times a day      VITAL:  T(C): , Max: 36.7 (12-01-21 @ 11:13)  T(F): , Max: 98 (12-01-21 @ 11:13)  HR: 118 (12-02-21 @ 04:54)  BP: 129/82 (12-02-21 @ 04:54)  BP(mean): --  RR: 18 (12-02-21 @ 04:54)  SpO2: 97% (12-02-21 @ 04:54)  Wt(kg): --    I and O's:    12-01 @ 07:01  -  12-02 @ 07:00  --------------------------------------------------------  IN: 300 mL / OUT: 0 mL / NET: 300 mL    12-02 @ 07:01  -  12-02 @ 10:43  --------------------------------------------------------  IN: 240 mL / OUT: 0 mL / NET: 240 mL          PHYSICAL EXAM:    Constitutional: NAD  Neck:  No JVD  Respiratory: CTAB/L  Cardiovascular: S1 and S2  Gastrointestinal: BS+, soft, NT/ND  Extremities: No peripheral edema  Neurological: A/O x 3, no focal deficits  Psychiatric: Normal mood, normal affect  : No Javier  Skin: No rashes  Access: avg and perm cath     LABS:                        9.1    8.45  )-----------( 219      ( 02 Dec 2021 05:37 )             30.6     12-02    134<L>  |  94<L>  |  64<H>  ----------------------------<  123<H>  4.8   |  22  |  6.31<H>    Ca    9.7      02 Dec 2021 05:37            Urine Studies:          RADIOLOGY & ADDITIONAL STUDIES:

## 2021-12-02 NOTE — PROGRESS NOTE ADULT - ASSESSMENT
70yo M w/ PMHx of CAD (s/p CABG 2019), CKD (unknown stage), DM2, Parkinson's Disease, HTN, depression presents with new onset acute heart failure exacerbation, NSTEMI, started on hep gtt, developed bl RP bleed, acute anemia. sp MICU course for hemorrhagic shock, 10/28 sp IR embolization l4 and l5 lumbar artery. for permacath and AVF.    # Acute systolic and diastolic heart failure, improved  # NSTEMI - conservative mgmt dt renal function and anemia-bld loss  # ESRD, new HD  # Atrial flutter  # acute hypoxic resp failure, improved  # hemorrhagic shock, left RP hematoma, acute blood loss anemia  # lupus anticoagulant +  Echo TTE mod to severe LV SD, hypokinesis anterior wall, not candidate for cath, medically manage  10/28 sp IR embolization l4 and l5 lumbar artery, h/h stable  HD via permacath per renal, on midodrine during dialysis  sp L AVF - vascular removed staples but a couple remain, await maturation    # Parkinsons disease   # delirium  currently cannot discharge 2/2 behavior and agitation, risk of pulling out permacath   corrected, resumed on seroquel, cont on depakote  c/w trihexyphenidyl, carbidopa/levodopa   per neuro, probable early lewy body dementia related paranoia and agitation    # DM2 (diabetes mellitus, type 2)  per endo  hba1c 6.4    # CAD (coronary artery disease)  # HTN  cont lopressor  c/w atorvastatin  asa on hold  Midodrine pre-dialysis     PCP Dr. Simons    DNR/DNI    dispo: AMS improved on Seroquel however, there still remains the concern that patient would be agitated and pulls out permacath at outside facility, will need palliative to fu re goals of care     d/w son -(hcp) on the phone updated pt's behavior, whether to continue HD given pt's uncontrolled behaviour with high risk of pulling out permacath  son will think abt it    [facility]  [phone]

## 2021-12-02 NOTE — CHART NOTE - NSCHARTNOTEFT_GEN_A_CORE
A Geriatrics and Palliative Medicine Consult was placed for goals of care (GOC). The patient is well known  to me and his HCP is his son, Branden. During my prior GOC d/w the patient's son, he was hopeful the patient was going to be able to be DC to [facility] and to continue HD in order to improve his condition. However, this time we are re-consulted since "... still remains the concern that patient would be agitated and pulls out PermCath at outside facility, will need palliative to fu re goals of care."  At this point, it is probably renal that needs to weight in on the appropriateness of continuing HD on a patient that is at risk of pulling "out PermCath at outside facility" or psych to further work on the patient's behavioral issues to further control his agitation. Furthermore, after an extensive review of the chart was conducted, no documentation was found for an attempt to address goals of care (related to HD and agitation) by the primary medical team, Nephrology, or Psych. At the same time, no intractable symptoms were identified from chart review. We will advise the primary consulting team to try and address GOC independently at this time.        Recommendations:  Document a GOC discussion by using the document: "Goals of Care Conversation" or documenting "Advance Care Planning" within a note or by selecting "Modify Template" (from within a note) and adding "Goals of Care" section.    When documenting goals of care, please include the following:  Who participated in the discussion?  What was discussed?  What was the outcome of the conversation?    If barriers are still present after following these steps, please notify our team and we will try to assist.      Dennis Juarez MD   Geriatrics and Palliative Care (GAP) Consult Service    of Geriatric and Palliative Medicine  [facility]      Please page the following number for clinical matters between the hours of 9 am and 5 pm from Monday through Friday : [phone]    After 5pm and on weekends, please see the contact information below:    In the event of newly developing, evolving, or worsening symptoms, please contact the Palliative Medicine team via pager (if the patient is at [facility] #[phone] or if the patient is at [facility] #[phone]) The Geriatric and Palliative Medicine service has coverage 24 hours a day/ 7 days a week to provide medical recommendations regarding symptom management needs via telephone

## 2021-12-02 NOTE — PROGRESS NOTE ADULT - SUBJECTIVE AND OBJECTIVE BOX
Chief complaint  Patient is a 71y old  Male who presents with a chief complaint of leg swelling (02 Dec 2021 13:17)   Review of systems  Patient in bed, looks comfortable, no hypoglycemic episodes.    Labs and Fingersticks  CAPILLARY BLOOD GLUCOSE      POCT Blood Glucose.: 254 mg/dL (02 Dec 2021 11:49)  POCT Blood Glucose.: 114 mg/dL (02 Dec 2021 08:09)  POCT Blood Glucose.: 160 mg/dL (01 Dec 2021 21:44)  POCT Blood Glucose.: 174 mg/dL (01 Dec 2021 17:01)      Anion Gap, Serum: 18 *H* (12-02 @ 05:37)  Anion Gap, Serum: 17 (12-01 @ 06:16)      Calcium, Total Serum: 9.7 (12-02 @ 05:37)  Calcium, Total Serum: 9.4 (12-01 @ 06:16)          12-02    134<L>  |  94<L>  |  64<H>  ----------------------------<  123<H>  4.8   |  22  |  6.31<H>    Ca    9.7      02 Dec 2021 05:37                          9.1    8.45  )-----------( 219      ( 02 Dec 2021 05:37 )             30.6     Medications  MEDICATIONS  (STANDING):  atorvastatin 80 milliGRAM(s) Oral at bedtime  carbidopa/levodopa  25/100 2.5 Tablet(s) Oral <User Schedule>  carbidopa/levodopa  25/100 2 Tablet(s) Oral <User Schedule>  chlorhexidine 4% Liquid 1 Application(s) Topical <User Schedule>  cholecalciferol 1000 Unit(s) Oral daily  dextrose 5%. 1000 milliLiter(s) (50 mL/Hr) IV Continuous <Continuous>  dextrose 5%. 1000 milliLiter(s) (100 mL/Hr) IV Continuous <Continuous>  dextrose 50% Injectable 25 Gram(s) IV Push once  dextrose 50% Injectable 12.5 Gram(s) IV Push once  dextrose 50% Injectable 25 Gram(s) IV Push once  diVALproex Sprinkle 250 milliGRAM(s) Oral two times a day  epoetin mari-epbx (RETACRIT) Injectable 44392 Unit(s) IV Push once  folic acid 1 milliGRAM(s) Oral daily  glucagon  Injectable 1 milliGRAM(s) IntraMuscular once  insulin glargine Injectable (LANTUS) 8 Unit(s) SubCutaneous at bedtime  insulin lispro (ADMELOG) corrective regimen sliding scale   SubCutaneous three times a day before meals  insulin lispro (ADMELOG) corrective regimen sliding scale   SubCutaneous at bedtime  levothyroxine 25 MICROGram(s) Oral daily  memantine 5 milliGRAM(s) Oral at bedtime  metoprolol tartrate 25 milliGRAM(s) Oral two times a day  midodrine 10 milliGRAM(s) Oral <User Schedule>  Nephro-celeste 1 Tablet(s) Oral daily  polyethylene glycol 3350 17 Gram(s) Oral two times a day  QUEtiapine 12.5 milliGRAM(s) Oral three times a day  senna Syrup 10 milliLiter(s) Oral at bedtime  trihexyphenidyl 2 milliGRAM(s) Oral three times a day      Physical Exam  General: Patient comfortable in bed  Vital Signs Last 12 Hrs  T(F): 97.8 (12-02-21 @ 10:54), Max: 97.8 (12-02-21 @ 04:54)  HR: 116 (12-02-21 @ 10:54) (116 - 118)  BP: 120/77 (12-02-21 @ 10:54) (120/77 - 129/82)  BP(mean): --  RR: 18 (12-02-21 @ 10:54) (18 - 18)  SpO2: 99% (12-02-21 @ 10:54) (97% - 99%)     Chief complaint  Patient is a 71y old  Male who presents with a chief complaint of leg swelling (02 Dec 2021 13:17)   Review of systems  Patient in bed, looks comfortable, no hypoglycemic episodes.    Labs and Fingersticks  CAPILLARY BLOOD GLUCOSE      POCT Blood Glucose.: 254 mg/dL (02 Dec 2021 11:49)  POCT Blood Glucose.: 114 mg/dL (02 Dec 2021 08:09)  POCT Blood Glucose.: 160 mg/dL (01 Dec 2021 21:44)  POCT Blood Glucose.: 174 mg/dL (01 Dec 2021 17:01)      Anion Gap, Serum: 18 *H* (12-02 @ 05:37)  Anion Gap, Serum: 17 (12-01 @ 06:16)      Calcium, Total Serum: 9.7 (12-02 @ 05:37)  Calcium, Total Serum: 9.4 (12-01 @ 06:16)          12-02    134<L>  |  94<L>  |  64<H>  ----------------------------<  123<H>  4.8   |  22  |  6.31<H>    Ca    9.7      02 Dec 2021 05:37                          9.1    8.45  )-----------( 219      ( 02 Dec 2021 05:37 )             30.6     Medications  MEDICATIONS  (STANDING):  atorvastatin 80 milliGRAM(s) Oral at bedtime  carbidopa/levodopa  25/100 2.5 Tablet(s) Oral <User Schedule>  carbidopa/levodopa  25/100 2 Tablet(s) Oral <User Schedule>  chlorhexidine 4% Liquid 1 Application(s) Topical <User Schedule>  cholecalciferol 1000 Unit(s) Oral daily  dextrose 5%. 1000 milliLiter(s) (50 mL/Hr) IV Continuous <Continuous>  dextrose 5%. 1000 milliLiter(s) (100 mL/Hr) IV Continuous <Continuous>  dextrose 50% Injectable 25 Gram(s) IV Push once  dextrose 50% Injectable 12.5 Gram(s) IV Push once  dextrose 50% Injectable 25 Gram(s) IV Push once  diVALproex Sprinkle 250 milliGRAM(s) Oral two times a day  epoetin mari-epbx (RETACRIT) Injectable 35493 Unit(s) IV Push once  folic acid 1 milliGRAM(s) Oral daily  glucagon  Injectable 1 milliGRAM(s) IntraMuscular once  insulin glargine Injectable (LANTUS) 8 Unit(s) SubCutaneous at bedtime  insulin lispro (ADMELOG) corrective regimen sliding scale   SubCutaneous three times a day before meals  insulin lispro (ADMELOG) corrective regimen sliding scale   SubCutaneous at bedtime  levothyroxine 25 MICROGram(s) Oral daily  memantine 5 milliGRAM(s) Oral at bedtime  metoprolol tartrate 25 milliGRAM(s) Oral two times a day  midodrine 10 milliGRAM(s) Oral <User Schedule>  Nephro-celeste 1 Tablet(s) Oral daily  polyethylene glycol 3350 17 Gram(s) Oral two times a day  QUEtiapine 12.5 milliGRAM(s) Oral three times a day  senna Syrup 10 milliLiter(s) Oral at bedtime  trihexyphenidyl 2 milliGRAM(s) Oral three times a day      Physical Exam  General: Patient comfortable in bed  Vital Signs Last 12 Hrs  T(F): 97.8 (12-02-21 @ 10:54), Max: 97.8 (12-02-21 @ 04:54)  HR: 116 (12-02-21 @ 10:54) (116 - 118)  BP: 120/77 (12-02-21 @ 10:54) (120/77 - 129/82)  BP(mean): --  RR: 18 (12-02-21 @ 10:54) (18 - 18)  SpO2: 99% (12-02-21 @ 10:54) (97% - 99%)

## 2021-12-02 NOTE — PROGRESS NOTE ADULT - SUBJECTIVE AND OBJECTIVE BOX
Follow-up Pulm Progress Note  Brayan Verma MD  [phone]    Breathing comfortably on RA  No new resp issues      Vital Signs Last 24 Hrs  T(C): 36.6 (02 Dec 2021 10:54), Max: 36.6 (01 Dec 2021 19:57)  T(F): 97.8 (02 Dec 2021 10:54), Max: 97.8 (01 Dec 2021 19:57)  HR: 116 (02 Dec 2021 10:54) (98 - 118)  BP: 120/77 (02 Dec 2021 10:54) (117/71 - 129/82)  BP(mean): --  RR: 18 (02 Dec 2021 10:54) (18 - 18)  SpO2: 99% (02 Dec 2021 10:54) (97% - 99%)                          9.1    8.45  )-----------( 219      ( 02 Dec 2021 05:37 )             30.6       12-02    134<L>  |  94<L>  |  64<H>  ----------------------------<  123<H>  4.8   |  22  |  6.31<H>    Ca    9.7      02 Dec 2021 05:37      Physical Examination:  PULM: Diminished basilar BS  CVS: Regular rate and rhythm, no murmurs, rubs, or gallops  ABD: Soft, non-tender  EXT:  No clubbing, cyanosis, or edema    RADIOLOGY REVIEWED  CXR:    CT chest:    TTE:

## 2021-12-02 NOTE — PROGRESS NOTE ADULT - ASSESSMENT
70yo M w/ PMHx of CAD (s/p CABG 2019), CKD (unknown stage), DM2, Parkinson's Disease, HTN, depression presents with bilateral leg swelling  ESRD   Severe LV dysfunction ;  A flutter   Confusion -         1 IR- Tried to re orient pt and tell him not to pull on the perm cath     2 Renal-  Next HD  todau     3 CVS- BP controlled at present, on Midodrine (with hold parameters in place, SBP> 160),  Lopressor for heart rate control     4 Anemia -  Retacrit 10k units with  HD     5 Vasc - s/p  LUE AVF  and most sutures were removed     Behavior is the rate limiting step preventing dc at present.  Palliative care noted.  Please read my note from 11/24 addressing his behavior        [facility]   [phone]

## 2021-12-02 NOTE — PROGRESS NOTE ADULT - SUBJECTIVE AND OBJECTIVE BOX
Patient is a 71y old  Male who presents with a chief complaint of leg swelling (02 Dec 2021 16:52)      INTERVAL HPI/OVERNIGHT EVENTS: noted  pt seen and examined this am   events noted  agitated last night, trying to pullout permacath  remians on restraints      Vital Signs Last 24 Hrs  T(C): 36.3 (02 Dec 2021 20:00), Max: 36.6 (02 Dec 2021 04:54)  T(F): 97.3 (02 Dec 2021 20:00), Max: 97.8 (02 Dec 2021 04:54)  HR: 116 (02 Dec 2021 20:00) (116 - 118)  BP: 134/84 (02 Dec 2021 20:00) (120/77 - 134/84)  BP(mean): --  RR: 18 (02 Dec 2021 20:00) (18 - 18)  SpO2: 98% (02 Dec 2021 20:00) (97% - 99%)    acetaminophen     Tablet .. 650 milliGRAM(s) Oral every 6 hours PRN  albuterol/ipratropium for Nebulization 3 milliLiter(s) Nebulizer every 6 hours PRN  atorvastatin 80 milliGRAM(s) Oral at bedtime  carbidopa/levodopa  25/100 2.5 Tablet(s) Oral <User Schedule>  carbidopa/levodopa  25/100 2 Tablet(s) Oral <User Schedule>  chlorhexidine 4% Liquid 1 Application(s) Topical <User Schedule>  cholecalciferol 1000 Unit(s) Oral daily  dextrose 5%. 1000 milliLiter(s) IV Continuous <Continuous>  dextrose 5%. 1000 milliLiter(s) IV Continuous <Continuous>  dextrose 50% Injectable 25 Gram(s) IV Push once  dextrose 50% Injectable 12.5 Gram(s) IV Push once  dextrose 50% Injectable 25 Gram(s) IV Push once  diVALproex Sprinkle 125 milliGRAM(s) Oral every 8 hours PRN  diVALproex Sprinkle 250 milliGRAM(s) Oral two times a day  folic acid 1 milliGRAM(s) Oral daily  glucagon  Injectable 1 milliGRAM(s) IntraMuscular once  guaiFENesin Oral Liquid (Sugar-Free) 200 milliGRAM(s) Oral every 6 hours PRN  insulin glargine Injectable (LANTUS) 8 Unit(s) SubCutaneous at bedtime  insulin lispro (ADMELOG) corrective regimen sliding scale   SubCutaneous three times a day before meals  insulin lispro (ADMELOG) corrective regimen sliding scale   SubCutaneous at bedtime  levothyroxine 25 MICROGram(s) Oral daily  memantine 5 milliGRAM(s) Oral at bedtime  metoprolol tartrate 25 milliGRAM(s) Oral two times a day  midodrine 10 milliGRAM(s) Oral <User Schedule>  Nephro-celeste 1 Tablet(s) Oral daily  polyethylene glycol 3350 17 Gram(s) Oral two times a day  QUEtiapine 12.5 milliGRAM(s) Oral three times a day  QUEtiapine 12.5 milliGRAM(s) Oral every 6 hours PRN  senna Syrup 10 milliLiter(s) Oral at bedtime  sodium chloride 0.9% lock flush 10 milliLiter(s) IV Push every 1 hour PRN  trihexyphenidyl 2 milliGRAM(s) Oral three times a day      PHYSICAL EXAM:  GENERAL: NAD,   EYES: conjunctiva and sclera clear  ENMT: Moist mucous membranes  NECK: Supple, No JVD, Normal thyroid  CHEST/LUNG: non labored, cta b/l  HEART: Regular rate and rhythm; No murmurs, rubs, or gallops  ABDOMEN: Soft, Nontender, Nondistended; Bowel sounds present  EXTREMITIES:  2+ Peripheral Pulses, No clubbing, cyanosis, or edema  LYMPH: No lymphadenopathy noted  SKIN: No rashes or lesions    Consultant(s) Notes Reviewed:  [x ] YES  [ ] NO  Care Discussed with Consultants/Other Providers [ x] YES  [ ] NO    LABS:                        9.1    8.45  )-----------( 219      ( 02 Dec 2021 05:37 )             30.6     12-02    134<L>  |  94<L>  |  64<H>  ----------------------------<  123<H>  4.8   |  22  |  6.31<H>    Ca    9.7      02 Dec 2021 05:37          CAPILLARY BLOOD GLUCOSE      POCT Blood Glucose.: 165 mg/dL (02 Dec 2021 16:32)  POCT Blood Glucose.: 254 mg/dL (02 Dec 2021 11:49)  POCT Blood Glucose.: 114 mg/dL (02 Dec 2021 08:09)  POCT Blood Glucose.: 160 mg/dL (01 Dec 2021 21:44)              RADIOLOGY & ADDITIONAL TESTS:    Imaging Personally Reviewed:  [x ] YES  [ ] NO

## 2021-12-02 NOTE — PROGRESS NOTE ADULT - ASSESSMENT
Assessment  DMT2: 71y Male with DM T2 with hyperglycemia, A1C 6.4%, was on insulin at home, now on low-dose basal insulin and coverage, blood sugars are trending within overall acceptable range with intermittent postprandial elevations, no hypoglycemias. Patient is eating meals, NAD, DC planning for rehab delayed 2/2 psych/agitation and risk for pulling out permacath. Palliative Care now on board, planning family discussion for GOC.  Hypothyroidism: Patient has no history thyroid disease, was not on any thyroid supplements, subclinical with low-normal FT4, lethargic, started on synthroid 25 mcg po daily, FT4 improved to 1.2.  CHF: on medications, stable, monitored.  HTN: Controlled,  on antihypertensive medications.  Parkinsons: on meds, monitored.  CKD: Monitor labs/BMP      Kelsie Reece MD  Cell: [phone]  Office: [phone]     Assessment  DMT2: 71y Male with DM T2 with hyperglycemia, A1C 6.4%, was on insulin at home, now on low-dose basal insulin and coverage,  blood sugars are trending within overall acceptable range with intermittent postprandial elevations, no hypoglycemias. Patient is eating meals, NAD, DC planning for rehab delayed 2/2 psych/agitation and risk for pulling out permacath. Palliative Care now on board, planning family discussion for GOC.  Hypothyroidism: Patient has no history thyroid disease, was not on any thyroid supplements, subclinical with low-normal FT4, lethargic, started on synthroid 25 mcg po daily, FT4 improved to 1.2.  CHF: on medications, stable, monitored.  HTN: Controlled,  on antihypertensive medications.  Parkinsons: on meds, monitored.  CKD: Monitor labs/BMP      Kelsie Reece MD  Cell: [phone]  Office: [phone]

## 2021-12-03 NOTE — PROGRESS NOTE ADULT - SUBJECTIVE AND OBJECTIVE BOX
Chief complaint  Patient is a 71y old  Male who presents with a chief complaint of leg swelling (03 Dec 2021 12:56)   Review of systems  Patient in bed, looks comfortable, no hypoglycemic episodes.    Labs and Fingersticks  CAPILLARY BLOOD GLUCOSE      POCT Blood Glucose.: 208 mg/dL (03 Dec 2021 11:56)  POCT Blood Glucose.: 161 mg/dL (03 Dec 2021 09:22)  POCT Blood Glucose.: 165 mg/dL (02 Dec 2021 16:32)      Anion Gap, Serum: 17 (12-03 @ 07:38)  Anion Gap, Serum: 18 *H* (12-02 @ 05:37)      Calcium, Total Serum: 9.4 (12-03 @ 07:38)  Calcium, Total Serum: 9.7 (12-02 @ 05:37)          12-03    133<L>  |  94<L>  |  44<H>  ----------------------------<  213<H>  4.1   |  22  |  4.42<H>    Ca    9.4      03 Dec 2021 07:38                          9.3    8.95  )-----------( 195      ( 03 Dec 2021 07:35 )             31.1     Medications  MEDICATIONS  (STANDING):  atorvastatin 80 milliGRAM(s) Oral at bedtime  carbidopa/levodopa  25/100 2.5 Tablet(s) Oral <User Schedule>  carbidopa/levodopa  25/100 2 Tablet(s) Oral <User Schedule>  chlorhexidine 4% Liquid 1 Application(s) Topical <User Schedule>  cholecalciferol 1000 Unit(s) Oral daily  dextrose 5%. 1000 milliLiter(s) (100 mL/Hr) IV Continuous <Continuous>  dextrose 5%. 1000 milliLiter(s) (50 mL/Hr) IV Continuous <Continuous>  dextrose 50% Injectable 25 Gram(s) IV Push once  dextrose 50% Injectable 12.5 Gram(s) IV Push once  dextrose 50% Injectable 25 Gram(s) IV Push once  diVALproex Sprinkle 250 milliGRAM(s) Oral two times a day  folic acid 1 milliGRAM(s) Oral daily  glucagon  Injectable 1 milliGRAM(s) IntraMuscular once  insulin glargine Injectable (LANTUS) 8 Unit(s) SubCutaneous at bedtime  insulin lispro (ADMELOG) corrective regimen sliding scale   SubCutaneous three times a day before meals  insulin lispro (ADMELOG) corrective regimen sliding scale   SubCutaneous at bedtime  levothyroxine 25 MICROGram(s) Oral daily  memantine 5 milliGRAM(s) Oral at bedtime  metoprolol tartrate 25 milliGRAM(s) Oral two times a day  midodrine 10 milliGRAM(s) Oral <User Schedule>  Nephro-celeste 1 Tablet(s) Oral daily  polyethylene glycol 3350 17 Gram(s) Oral two times a day  QUEtiapine 12.5 milliGRAM(s) Oral three times a day  senna Syrup 10 milliLiter(s) Oral at bedtime  trihexyphenidyl 2 milliGRAM(s) Oral three times a day      Physical Exam  General: Patient comfortable in bed  Vital Signs Last 12 Hrs  T(F): 99.1 (12-03-21 @ 12:40), Max: 99.3 (12-03-21 @ 04:31)  HR: 101 (12-03-21 @ 12:40) (98 - 101)  BP: 122/77 (12-03-21 @ 12:40) (119/76 - 122/77)  BP(mean): --  RR: 18 (12-03-21 @ 12:40) (18 - 18)  SpO2: 99% (12-03-21 @ 12:40) (98% - 99%)  Neck: No palpable thyroid nodules.  CVS: S1S2, No murmurs  Respiratory: No wheezing, no crepitations  GI: Abdomen soft, bowel sounds positive  Musculoskeletal:  edema lower extremities.   Skin: No skin rashes, no ecchymosis    Diagnostics    Free Thyroxine, Serum: AM Sched. Collection: 06-Nov-2021 06:00 (11-05 @ 13:18)           Chief complaint  Patient is a 71y old  Male who presents with a chief complaint of leg swelling (03 Dec 2021 12:56)   Review of systems  Patient in bed, looks comfortable, no hypoglycemic episodes.    Labs and Fingersticks  CAPILLARY BLOOD GLUCOSE      POCT Blood Glucose.: 208 mg/dL (03 Dec 2021 11:56)  POCT Blood Glucose.: 161 mg/dL (03 Dec 2021 09:22)  POCT Blood Glucose.: 165 mg/dL (02 Dec 2021 16:32)      Anion Gap, Serum: 17 (12-03 @ 07:38)  Anion Gap, Serum: 18 *H* (12-02 @ 05:37)      Calcium, Total Serum: 9.4 (12-03 @ 07:38)  Calcium, Total Serum: 9.7 (12-02 @ 05:37)          12-03    133<L>  |  94<L>  |  44<H>  ----------------------------<  213<H>  4.1   |  22  |  4.42<H>    Ca    9.4      03 Dec 2021 07:38                          9.3    8.95  )-----------( 195      ( 03 Dec 2021 07:35 )             31.1     Medications  MEDICATIONS  (STANDING):  atorvastatin 80 milliGRAM(s) Oral at bedtime  carbidopa/levodopa  25/100 2.5 Tablet(s) Oral <User Schedule>  carbidopa/levodopa  25/100 2 Tablet(s) Oral <User Schedule>  chlorhexidine 4% Liquid 1 Application(s) Topical <User Schedule>  cholecalciferol 1000 Unit(s) Oral daily  dextrose 5%. 1000 milliLiter(s) (100 mL/Hr) IV Continuous <Continuous>  dextrose 5%. 1000 milliLiter(s) (50 mL/Hr) IV Continuous <Continuous>  dextrose 50% Injectable 25 Gram(s) IV Push once  dextrose 50% Injectable 12.5 Gram(s) IV Push once  dextrose 50% Injectable 25 Gram(s) IV Push once  diVALproex Sprinkle 250 milliGRAM(s) Oral two times a day  folic acid 1 milliGRAM(s) Oral daily  glucagon  Injectable 1 milliGRAM(s) IntraMuscular once  insulin glargine Injectable (LANTUS) 8 Unit(s) SubCutaneous at bedtime  insulin lispro (ADMELOG) corrective regimen sliding scale   SubCutaneous three times a day before meals  insulin lispro (ADMELOG) corrective regimen sliding scale   SubCutaneous at bedtime  levothyroxine 25 MICROGram(s) Oral daily  memantine 5 milliGRAM(s) Oral at bedtime  metoprolol tartrate 25 milliGRAM(s) Oral two times a day  midodrine 10 milliGRAM(s) Oral <User Schedule>  Nephro-celeste 1 Tablet(s) Oral daily  polyethylene glycol 3350 17 Gram(s) Oral two times a day  QUEtiapine 12.5 milliGRAM(s) Oral three times a day  senna Syrup 10 milliLiter(s) Oral at bedtime  trihexyphenidyl 2 milliGRAM(s) Oral three times a day      Physical Exam  General: Patient comfortable in bed  Vital Signs Last 12 Hrs  T(F): 99.1 (12-03-21 @ 12:40), Max: 99.3 (12-03-21 @ 04:31)  HR: 101 (12-03-21 @ 12:40) (98 - 101)  BP: 122/77 (12-03-21 @ 12:40) (119/76 - 122/77)  BP(mean): --  RR: 18 (12-03-21 @ 12:40) (18 - 18)  SpO2: 99% (12-03-21 @ 12:40) (98% - 99%)  Neck: No palpable thyroid nodules.  CVS: S1S2, No murmurs  Respiratory: No wheezing, no crepitations  GI: Abdomen soft, bowel sounds positive  Musculoskeletal:  edema lower extremities.   Skin: No skin rashes, no ecchymosis    Diagnostics    Free Thyroxine, Serum: AM Sched. Collection: 06-Nov-2021 06:00 (11-05 @ 13:18)

## 2021-12-03 NOTE — PROGRESS NOTE ADULT - ASSESSMENT
Assessment  DMT2: 71y Male with DM T2 with hyperglycemia, A1C 6.4%, was on insulin at home, now on low-dose basal insulin and coverage, blood sugars are trending within overall acceptable range with intermittent postprandial elevations, no hypoglycemias. Patient is eating meals, NAD, DC planning for rehab delayed 2/2 psych/agitation, Palliative Care on board, planning family discussion for GOC.  Hypothyroidism: Patient has no history thyroid disease, was not on any thyroid supplements, subclinical with low-normal FT4, lethargic, started on synthroid 25 mcg po daily, FT4 improved to 1.2.  CHF: on medications, stable, monitored.  HTN: Controlled,  on antihypertensive medications.  Parkinsons: on meds, monitored.  CKD: Monitor labs/BMP      Kelsie Reece MD  Cell: [phone]  Office: [phone]     Assessment  DMT2: 71y Male with DM T2 with hyperglycemia, A1C 6.4%, was on insulin at home, now on low-dose basal insulin and coverage, blood sugars are trending within overall acceptable range with intermittent postprandial elevations, no hypoglycemias.  Patient is eating meals, NAD, DC planning for rehab delayed 2/2 psych/agitation, Palliative Care on board, planning family discussion for GOC.  Hypothyroidism: Patient has no history thyroid disease, was not on any thyroid supplements, subclinical with low-normal FT4, lethargic, started on synthroid 25 mcg po daily, FT4 improved to 1.2.  CHF: on medications, stable, monitored.  HTN: Controlled,  on antihypertensive medications.  Parkinsons: on meds, monitored.  CKD: Monitor labs/BMP      Kelsie Reece MD  Cell: [phone]  Office: [phone]

## 2021-12-03 NOTE — PROGRESS NOTE ADULT - SUBJECTIVE AND OBJECTIVE BOX
[facility]      Seen earlier today, and examined.  - Today, patient is without complaints.           *****MEDICATIONS: Current medication reviewed and documented.    MEDICATIONS  (STANDING):  atorvastatin 80 milliGRAM(s) Oral at bedtime  carbidopa/levodopa  25/100 2.5 Tablet(s) Oral <User Schedule>  carbidopa/levodopa  25/100 2 Tablet(s) Oral <User Schedule>  chlorhexidine 4% Liquid 1 Application(s) Topical <User Schedule>  cholecalciferol 1000 Unit(s) Oral daily  dextrose 5%. 1000 milliLiter(s) (50 mL/Hr) IV Continuous <Continuous>  dextrose 5%. 1000 milliLiter(s) (100 mL/Hr) IV Continuous <Continuous>  dextrose 50% Injectable 25 Gram(s) IV Push once  dextrose 50% Injectable 12.5 Gram(s) IV Push once  dextrose 50% Injectable 25 Gram(s) IV Push once  diVALproex Sprinkle 250 milliGRAM(s) Oral two times a day  folic acid 1 milliGRAM(s) Oral daily  glucagon  Injectable 1 milliGRAM(s) IntraMuscular once  insulin glargine Injectable (LANTUS) 8 Unit(s) SubCutaneous at bedtime  insulin lispro (ADMELOG) corrective regimen sliding scale   SubCutaneous three times a day before meals  insulin lispro (ADMELOG) corrective regimen sliding scale   SubCutaneous at bedtime  levothyroxine 25 MICROGram(s) Oral daily  memantine 5 milliGRAM(s) Oral at bedtime  metoprolol tartrate 25 milliGRAM(s) Oral two times a day  midodrine 10 milliGRAM(s) Oral <User Schedule>  Nephro-celeste 1 Tablet(s) Oral daily  polyethylene glycol 3350 17 Gram(s) Oral two times a day  QUEtiapine 12.5 milliGRAM(s) Oral three times a day  senna Syrup 10 milliLiter(s) Oral at bedtime  trihexyphenidyl 2 milliGRAM(s) Oral three times a day    MEDICATIONS  (PRN):  acetaminophen     Tablet .. 650 milliGRAM(s) Oral every 6 hours PRN Mild Pain (1 - 3)  albuterol/ipratropium for Nebulization 3 milliLiter(s) Nebulizer every 6 hours PRN Shortness of Breath and/or Wheezing  diVALproex Sprinkle 125 milliGRAM(s) Oral every 8 hours PRN Agitation  guaiFENesin Oral Liquid (Sugar-Free) 200 milliGRAM(s) Oral every 6 hours PRN Cough  QUEtiapine 12.5 milliGRAM(s) Oral every 6 hours PRN agitation  sodium chloride 0.9% lock flush 10 milliLiter(s) IV Push every 1 hour PRN Pre/post blood products, medications, blood draw, and to maintain line patency          ***** VITAL SIGNS:  T(F): 99.1 (21 @ 12:40), Max: 99.3 (21 @ 04:31)  HR: 101 (21 @ 12:40) (91 - 116)  BP: 122/77 (21 @ 12:40) (118/67 - 134/84)  RR: 18 (21 @ 12:40) (18 - 18)  SpO2: 99% (21 @ 12:40) (98% - 99%)  Wt(kg): --  ,   I&O's Summary    02 Dec 2021 07:01  -  03 Dec 2021 07:00  --------------------------------------------------------  IN: 1780 mL / OUT: 2100 mL / NET: -320 mL    03 Dec 2021 07:01  -  03 Dec 2021 12:57  --------------------------------------------------------  IN: 50 mL / OUT: 0 mL / NET: 50 mL             *****PHYSICAL EXAM:   alert oriented x 2 somewhat agitated, but reorientable   limited interaction as pt is not fully cooperative   sleepy falls asleep   EOmi  blinks to threat   Tongue is midline     Moving all 4 ext spontaneously antigravity            *****LAB AND IMAGIN.3    8.95  )-----------( 195      ( 03 Dec 2021 07:35 )             31.1               12    133<L>  |  94<L>  |  44<H>  ----------------------------<  213<H>  4.1   |  22  |  4.42<H>    Ca    9.4      03 Dec 2021 07:38                           [All pertinent recent Imaging/Reports reviewed]           *****A S S E S S M E N T   A N D   P L A N :    72yo M w/ PMHx of CAD (s/p CABG ), CKD (unknown stage), DM2, Parkinson's Disease, HTN, depression presents with bilateral leg swelling, patient's daughter in-law at bedside Elsy Quintero ([address] [place] [address] Nurse) provides the history, the daughter reports the patient is a poor historian, unable to remember having conversations with others and likely cannot weigh risk/benefits of medical conditions, she reports that he has had bilateral lower extremity swelling for the past few months, but over the past 2 weeks it has significantly worsened, he has further difficulty ambulating due to swelling, his outpatient provider attempted to manage with 20mg lasix and then increased to 40mg lasix but the patient never took the increased dose, his symptoms ar persistent throughout the day and are extremely severe, he has developed wounds of the legs with weeping of fluid due to the swelling, she reports that the patient is frequently non-compliant with medications and medical management, he lives at home with his son and daughter in law, he denies chest pain, shortness of breath, fever/chills, cough, sputum, in the ED, he was tachycardic but hemodynamically stable, afebrile, saturating well on room air, labs were significant for elevated BNP, elevated creatinine, imaging showed moderate left pleural effusion, patient was admitted to general medicine for further management          Problem/Recommendations 1:ams of unclear etiology   likely related to multiple metabolic derangements related to uremia  sleep wake cycle disruption and poor nutritional intake  would regulate sleep wake cycle  check b12/b1 folate/tsh /ammonia wnl   thiamine 500 iv q 8 x 2 days after checking vitamin b1 level   nutrition consult for severe protein caloric malnutrition   discussed with elsy( daughter in law), agreeing to start namenda 5 mg qhs probable early lewy body dementia related paranoia and agitation   plan likely needs to adjust dose of antipsychotics, however avoid typical antipsychotics  elsy denies any alcohol intake at all.     lowered seroquel 12.5 due to sedation    over night pt did not sleep   now fell asleep as per aide at 7.15   regulate sleep wake cycle   dc am dose of seroquel   increase night time dose to regulate sleep wake cycle        more awake   conversant.   still somewhat agitated  does follow simple commands   pt able to arouse, converse    more awake, following commands   still with paranoia       Problem/Recommendations 2: weakness   r/o orthostatic hypotension   pt shaking on standing up    prob deconditioning   pt eval   oob to chair as tolerated        Problem/Recommendations 3: parkinsons   continue current regimen   sinemet 2. 5 twice a day and 2 mg qh        Thank you for allowing me to participate in the care of this patient. Will continue to follow patient periodically. Please do not hesitate to call me if you have any  questions or if there has been a change in patients neurological status     ________________  Lily Fang MD  [facility]  [phone]      33 minutes spent on total encounter; more than 50 % of the visit was  spent counseling about plan of care, compliance to diet/exercise and medication regimen and or  coordinating care by the attending physician.      It is advised that stroke patients follow up with ZACH Albarran @ [phone] in 1- 2 weeks.   Others please follow up with Dr. Michael Nissenbaum [phone]

## 2021-12-03 NOTE — PROGRESS NOTE ADULT - SUBJECTIVE AND OBJECTIVE BOX
NEPHROLOGY-[facility] [phone]        Patient seen and examined in bed.  He was the same         MEDICATIONS  (STANDING):  atorvastatin 80 milliGRAM(s) Oral at bedtime  carbidopa/levodopa  25/100 2.5 Tablet(s) Oral <User Schedule>  carbidopa/levodopa  25/100 2 Tablet(s) Oral <User Schedule>  chlorhexidine 4% Liquid 1 Application(s) Topical <User Schedule>  cholecalciferol 1000 Unit(s) Oral daily  dextrose 5%. 1000 milliLiter(s) (100 mL/Hr) IV Continuous <Continuous>  dextrose 5%. 1000 milliLiter(s) (50 mL/Hr) IV Continuous <Continuous>  dextrose 50% Injectable 25 Gram(s) IV Push once  dextrose 50% Injectable 12.5 Gram(s) IV Push once  dextrose 50% Injectable 25 Gram(s) IV Push once  diVALproex Sprinkle 250 milliGRAM(s) Oral two times a day  folic acid 1 milliGRAM(s) Oral daily  glucagon  Injectable 1 milliGRAM(s) IntraMuscular once  insulin glargine Injectable (LANTUS) 8 Unit(s) SubCutaneous at bedtime  insulin lispro (ADMELOG) corrective regimen sliding scale   SubCutaneous three times a day before meals  insulin lispro (ADMELOG) corrective regimen sliding scale   SubCutaneous at bedtime  levothyroxine 25 MICROGram(s) Oral daily  memantine 5 milliGRAM(s) Oral at bedtime  metoprolol tartrate 25 milliGRAM(s) Oral two times a day  midodrine 10 milliGRAM(s) Oral <User Schedule>  Nephro-celeste 1 Tablet(s) Oral daily  polyethylene glycol 3350 17 Gram(s) Oral two times a day  QUEtiapine 12.5 milliGRAM(s) Oral three times a day  senna Syrup 10 milliLiter(s) Oral at bedtime  trihexyphenidyl 2 milliGRAM(s) Oral three times a day      VITAL:  T(C): , Max: 37.4 (12-03-21 @ 04:31)  T(F): , Max: 99.3 (12-03-21 @ 04:31)  HR: 98 (12-03-21 @ 04:31)  BP: 119/76 (12-03-21 @ 04:31)  BP(mean): --  RR: 18 (12-03-21 @ 04:31)  SpO2: 98% (12-03-21 @ 04:31)  Wt(kg): --    I and O's:    12-02 @ 07:01  -  12-03 @ 07:00  --------------------------------------------------------  IN: 1780 mL / OUT: 2100 mL / NET: -320 mL    12-03 @ 07:01  -  12-03 @ 09:39  --------------------------------------------------------  IN: 50 mL / OUT: 0 mL / NET: 50 mL          PHYSICAL EXAM:    Constitutional: NAD  Neck:  No JVD  Respiratory: CTAB/L  Cardiovascular: S1 and S2  Gastrointestinal: BS+, soft, NT/ND  Extremities: No peripheral edema  Neurological: A/O x 3, no focal deficits  Psychiatric: Normal mood, normal affect  : No Javier  Skin: No rashes  Access: avf and perm cath     LABS:                        9.3    8.95  )-----------( 195      ( 03 Dec 2021 07:35 )             31.1     12-03    133<L>  |  94<L>  |  44<H>  ----------------------------<  213<H>  4.1   |  22  |  4.42<H>    Ca    9.4      03 Dec 2021 07:38            Urine Studies:          RADIOLOGY & ADDITIONAL STUDIES:

## 2021-12-03 NOTE — PROGRESS NOTE ADULT - ASSESSMENT
72yo M w/ PMHx of CAD (s/p CABG 2019), CKD (unknown stage), DM2, Parkinson's Disease, HTN, depression presents with new onset acute heart failure exacerbation, NSTEMI, started on hep gtt, developed bl RP bleed, acute anemia. sp MICU course for hemorrhagic shock, 10/28 sp IR embolization l4 and l5 lumbar artery. for permacath and AVF.    # Acute systolic and diastolic heart failure, improved  # NSTEMI - conservative mgmt dt renal function and anemia-bld loss  # ESRD, new HD  # Atrial flutter  # acute hypoxic resp failure, improved  # hemorrhagic shock, left RP hematoma, acute blood loss anemia  # lupus anticoagulant +  Echo TTE mod to severe LV SD, hypokinesis anterior wall, not candidate for cath, medically manage  10/28 sp IR embolization l4 and l5 lumbar artery, h/h stable  HD via permacath per renal, on midodrine during dialysis  sp L AVF - vascular removed staples but a couple remain, await maturation    # Parkinsons disease   # delirium  currently cannot discharge 2/2 behavior and agitation, risk of pulling out permacath   corrected, resumed on seroquel, cont on depakote  c/w trihexyphenidyl, carbidopa/levodopa   per neuro, probable early lewy body dementia related paranoia and agitation    # DM2 (diabetes mellitus, type 2)  per endo  hba1c 6.4    # CAD (coronary artery disease)  # HTN  cont lopressor  c/w atorvastatin  asa on hold  Midodrine pre-dialysis     PCP Dr. Simons    DNR/DNI    dispo: AMS improved on Seroquel however, there still remains the concern that patient would be agitated and pulls out permacath at outside facility, will need palliative to fu re goals of care     d/w son -(hcp) on the phone updated pt's behavior, whether to continue HD given pt's uncontrolled behaviour with high risk of pulling out permacath  son will think abt it    [facility]  [phone]

## 2021-12-03 NOTE — PROGRESS NOTE ADULT - ASSESSMENT
70yo M w/ PMHx of CAD (s/p CABG 2019), CKD (unknown stage), DM2, Parkinson's Disease, HTN, depression presents with bilateral leg swelling  ESRD   Severe LV dysfunction ;  A flutter   Confusion -         1Palliation-More alert this am     2 Renal-  Next HD in am    3 CVS- BP controlled at present, on Midodrine (with hold parameters in place, SBP> 160),  Lopressor for heart rate control     4 Anemia -  Retacrit 10k units with  HD     5 Vasc - s/p  LUE AVF  and most sutures were removed (call vasc to remove the rest)    Behavior is the rate limiting step preventing dc at present.  Palliative care noted.          [facility]   [phone]

## 2021-12-03 NOTE — PROGRESS NOTE ADULT - SUBJECTIVE AND OBJECTIVE BOX
Patient is a 71y old  Male who presents with a chief complaint of leg swelling (03 Dec 2021 13:10)      INTERVAL HPI/OVERNIGHT EVENTS: noted  pt seen and examined this am   events noted  more alert and cooperative today  son at bedside      Vital Signs Last 24 Hrs  T(C): 36.8 (03 Dec 2021 20:19), Max: 37.4 (03 Dec 2021 04:31)  T(F): 98.3 (03 Dec 2021 20:19), Max: 99.3 (03 Dec 2021 04:31)  HR: 111 (03 Dec 2021 20:19) (98 - 111)  BP: 133/89 (03 Dec 2021 20:19) (119/76 - 133/89)  BP(mean): --  RR: 18 (03 Dec 2021 20:19) (18 - 18)  SpO2: 100% (03 Dec 2021 20:19) (98% - 100%)    acetaminophen     Tablet .. 650 milliGRAM(s) Oral every 6 hours PRN  albuterol/ipratropium for Nebulization 3 milliLiter(s) Nebulizer every 6 hours PRN  atorvastatin 80 milliGRAM(s) Oral at bedtime  carbidopa/levodopa  25/100 2.5 Tablet(s) Oral <User Schedule>  carbidopa/levodopa  25/100 2 Tablet(s) Oral <User Schedule>  chlorhexidine 4% Liquid 1 Application(s) Topical <User Schedule>  cholecalciferol 1000 Unit(s) Oral daily  dextrose 5%. 1000 milliLiter(s) IV Continuous <Continuous>  dextrose 5%. 1000 milliLiter(s) IV Continuous <Continuous>  dextrose 50% Injectable 25 Gram(s) IV Push once  dextrose 50% Injectable 12.5 Gram(s) IV Push once  dextrose 50% Injectable 25 Gram(s) IV Push once  diVALproex Sprinkle 125 milliGRAM(s) Oral every 8 hours PRN  diVALproex Sprinkle 250 milliGRAM(s) Oral two times a day  folic acid 1 milliGRAM(s) Oral daily  glucagon  Injectable 1 milliGRAM(s) IntraMuscular once  guaiFENesin Oral Liquid (Sugar-Free) 200 milliGRAM(s) Oral every 6 hours PRN  insulin glargine Injectable (LANTUS) 8 Unit(s) SubCutaneous at bedtime  insulin lispro (ADMELOG) corrective regimen sliding scale   SubCutaneous three times a day before meals  insulin lispro (ADMELOG) corrective regimen sliding scale   SubCutaneous at bedtime  levothyroxine 25 MICROGram(s) Oral daily  memantine 5 milliGRAM(s) Oral at bedtime  metoprolol tartrate 25 milliGRAM(s) Oral two times a day  midodrine 10 milliGRAM(s) Oral <User Schedule>  Nephro-celeste 1 Tablet(s) Oral daily  polyethylene glycol 3350 17 Gram(s) Oral two times a day  QUEtiapine 12.5 milliGRAM(s) Oral three times a day  QUEtiapine 12.5 milliGRAM(s) Oral every 6 hours PRN  senna Syrup 10 milliLiter(s) Oral at bedtime  sodium chloride 0.9% lock flush 10 milliLiter(s) IV Push every 1 hour PRN  trihexyphenidyl 2 milliGRAM(s) Oral three times a day      PHYSICAL EXAM:  GENERAL: NAD,   EYES: conjunctiva and sclera clear  ENMT: Moist mucous membranes  NECK: Supple, No JVD, Normal thyroid  CHEST/LUNG: non labored, cta b/l  HEART: Regular rate and rhythm; No murmurs, rubs, or gallops  ABDOMEN: Soft, Nontender, Nondistended; Bowel sounds present  EXTREMITIES:  2+ Peripheral Pulses, No clubbing, cyanosis, or edema  LYMPH: No lymphadenopathy noted  SKIN: No rashes or lesions    Consultant(s) Notes Reviewed:  [x ] YES  [ ] NO  Care Discussed with Consultants/Other Providers [ x] YES  [ ] NO    LABS:                        9.3    8.95  )-----------( 195      ( 03 Dec 2021 07:35 )             31.1     12-03    133<L>  |  94<L>  |  44<H>  ----------------------------<  213<H>  4.1   |  22  |  4.42<H>    Ca    9.4      03 Dec 2021 07:38          CAPILLARY BLOOD GLUCOSE      POCT Blood Glucose.: 169 mg/dL (03 Dec 2021 21:34)  POCT Blood Glucose.: 146 mg/dL (03 Dec 2021 17:04)  POCT Blood Glucose.: 208 mg/dL (03 Dec 2021 11:56)  POCT Blood Glucose.: 161 mg/dL (03 Dec 2021 09:22)              RADIOLOGY & ADDITIONAL TESTS:    Imaging Personally Reviewed:  [x ] YES  [ ] NO

## 2021-12-03 NOTE — CHART NOTE - NSCHARTNOTEFT_GEN_A_CORE
Please see my chart note dated 12/2 as well as the addendum on that note that was entered today.     At this time, will not be consulting on this patient.     A consult was ordered to address complex medical decision making in this patient.  An extensive chart review was conducted.  Code status documented:    Recommend initiating a goals of care discussion with the health care agent or legal surrogate (hierarchy listed below), and documenting the outcome of that conversation to accelerate decision making.    Follow up:    Family Heatlh Care Decision Act Surrogate Decision Maker Hierarchy  [facility] Article 81 Guardian-->Spouse or domestic partner--> Adult child-->Parent-->Sibling--> Close friend      In the event of newly developing, evolving, or worsening symptoms, please contact the Palliative Medicine team via pager (if the patient is at [facility] #[phone] or if the patient is at [facility] #[phone]) The Geriatric and Palliative Medicine service has coverage 24 hours a day/ 7 days a week to provide medical recommendations regarding symptom management needs via telephone Please see my chart note dated 12/2 as well as the addendum on that note that was entered today.     At this time, will not be consulting on this patient.         Dennis Juarez MD   Geriatrics and Palliative Care ([facility]) Consult Service    of Geriatric and Palliative Medicine  [facility]      Please page the following number for clinical matters between the hours of 9 am and 5 pm from Monday through Friday : [phone]    After 5pm and on weekends, please see the contact information below:    In the event of newly developing, evolving, or worsening symptoms, please contact the Palliative Medicine team via pager (if the patient is at [facility] #[phone] or if the patient is at [facility] #[phone]) The Geriatric and Palliative Medicine service has coverage 24 hours a day/ 7 days a week to provide medical recommendations regarding symptom management needs via telephone

## 2021-12-04 NOTE — PROGRESS NOTE ADULT - ASSESSMENT
ASSESSMENT/PLAN:  70yo M w/ PMHx of CAD (s/p CABG 2019), CKD (unknown stage), DM2, Parkinson's Disease, HTN, depression presents with bilateral leg swelling  ESRD   Severe LV dysfunction; A flutter   Confusion -       1 Palliation - More alert and cooperative with assessment  2 Renal-  HD today, Next HD Tuesday   3 CVS- BP controlled at present, on Midodrine (with hold parameters in place, SBP> 160),  Lopressor for heart rate control   4 Anemia -  Retacrit 10k units with  HD   5 Vasc - s/p  LUE AVF  and most sutures were removed (call vasc to remove the rest)    Behavior is the rate limiting step preventing dc at present.  Palliative care f/u eval noted.

## 2021-12-04 NOTE — PROGRESS NOTE ADULT - SUBJECTIVE AND OBJECTIVE BOX
Chief complaint    Patient is a 71y old  Male who presents with a chief complaint of leg swelling (04 Dec 2021 14:30)   Review of systems  Patient in bed, appears comfortable.    Labs and Fingersticks  CAPILLARY BLOOD GLUCOSE      POCT Blood Glucose.: 223 mg/dL (04 Dec 2021 16:40)  POCT Blood Glucose.: 201 mg/dL (04 Dec 2021 12:54)  POCT Blood Glucose.: 159 mg/dL (04 Dec 2021 07:46)  POCT Blood Glucose.: 169 mg/dL (03 Dec 2021 21:34)      Anion Gap, Serum: 17 (12-03 @ 07:38)      Calcium, Total Serum: 9.4 (12-03 @ 07:38)          12-03    133<L>  |  94<L>  |  44<H>  ----------------------------<  213<H>  4.1   |  22  |  4.42<H>    Ca    9.4      03 Dec 2021 07:38                          9.3    8.95  )-----------( 195      ( 03 Dec 2021 07:35 )             31.1     Medications  MEDICATIONS  (STANDING):  atorvastatin 80 milliGRAM(s) Oral at bedtime  carbidopa/levodopa  25/100 2.5 Tablet(s) Oral <User Schedule>  carbidopa/levodopa  25/100 2 Tablet(s) Oral <User Schedule>  chlorhexidine 4% Liquid 1 Application(s) Topical <User Schedule>  cholecalciferol 1000 Unit(s) Oral daily  dextrose 5%. 1000 milliLiter(s) (50 mL/Hr) IV Continuous <Continuous>  dextrose 5%. 1000 milliLiter(s) (100 mL/Hr) IV Continuous <Continuous>  dextrose 50% Injectable 25 Gram(s) IV Push once  dextrose 50% Injectable 12.5 Gram(s) IV Push once  dextrose 50% Injectable 25 Gram(s) IV Push once  diVALproex Sprinkle 250 milliGRAM(s) Oral two times a day  folic acid 1 milliGRAM(s) Oral daily  glucagon  Injectable 1 milliGRAM(s) IntraMuscular once  insulin glargine Injectable (LANTUS) 8 Unit(s) SubCutaneous at bedtime  insulin lispro (ADMELOG) corrective regimen sliding scale   SubCutaneous three times a day before meals  insulin lispro (ADMELOG) corrective regimen sliding scale   SubCutaneous at bedtime  levothyroxine 25 MICROGram(s) Oral daily  memantine 5 milliGRAM(s) Oral at bedtime  metoprolol tartrate 25 milliGRAM(s) Oral two times a day  midodrine 10 milliGRAM(s) Oral <User Schedule>  Nephro-celeste 1 Tablet(s) Oral daily  polyethylene glycol 3350 17 Gram(s) Oral two times a day  QUEtiapine 12.5 milliGRAM(s) Oral three times a day  senna Syrup 10 milliLiter(s) Oral at bedtime  trihexyphenidyl 2 milliGRAM(s) Oral three times a day      Physical Exam  General: Patient comfortable in bed  Vital Signs Last 12 Hrs  T(F): 97.8 (12-04-21 @ 13:02), Max: 97.9 (12-04-21 @ 09:05)  HR: 108 (12-04-21 @ 17:45) (98 - 116)  BP: 135/79 (12-04-21 @ 17:45) (112/76 - 135/79)  BP(mean): --  RR: 18 (12-04-21 @ 13:02) (17 - 18)  SpO2: 100% (12-04-21 @ 13:02) (96% - 100%)  Neck: No palpable thyroid nodules.  CVS: S1S2, No murmurs  Respiratory: No wheezing, no crepitations  GI: Abdomen soft, bowel sounds positive  Musculoskeletal:  edema lower extremities.     Diagnostics

## 2021-12-04 NOTE — PROGRESS NOTE ADULT - ASSESSMENT
70yo M w/ PMHx of CAD (s/p CABG 2019), CKD (unknown stage), DM2, Parkinson's Disease, HTN, depression presents with new onset acute heart failure exacerbation, NSTEMI, started on hep gtt, developed bl RP bleed, acute anemia. sp MICU course for hemorrhagic shock, 10/28 sp IR embolization l4 and l5 lumbar artery. for permacath and AVF.    # Acute systolic and diastolic heart failure, improved  # NSTEMI - conservative mgmt dt renal function and anemia-bld loss  # ESRD, new HD  # Atrial flutter  # acute hypoxic resp failure, improved  # hemorrhagic shock, left RP hematoma, acute blood loss anemia  # lupus anticoagulant +  Echo TTE mod to severe LV SD, hypokinesis anterior wall, not candidate for cath, medically manage  10/28 sp IR embolization l4 and l5 lumbar artery, h/h stable  HD via permacath per renal, on midodrine during dialysis  sp L AVF - vascular removed staples but a couple remain, await maturation    # Parkinsons disease   # delirium  currently cannot discharge 2/2 behavior and agitation, risk of pulling out permacath   corrected, resumed on seroquel, cont on depakote  c/w trihexyphenidyl, carbidopa/levodopa   per neuro, probable early lewy body dementia related paranoia and agitation    # DM2 (diabetes mellitus, type 2)  per endo  hba1c 6.4    # CAD (coronary artery disease)  # HTN  cont lopressor  c/w atorvastatin  asa on hold  Midodrine pre-dialysis     PCP Dr. Simons    DNR/DNI    dispo: AMS improved on Seroquel however, there still remains the concern that patient would be agitated and pulls out permacath at outside facility, will need palliative to fu re goals of care     d/w son -(hcp) on the phone updated pt's behavior, whether to continue HD given pt's uncontrolled behaviour with high risk of pulling out permacath  son will think abt it    [facility]  [phone]

## 2021-12-04 NOTE — PROGRESS NOTE ADULT - SUBJECTIVE AND OBJECTIVE BOX
Patient is a 71y old  Male who presents with a chief complaint of leg swelling (03 Dec 2021 13:46)      INTERVAL HPI/OVERNIGHT EVENTS: noted  pt seen and examined this am   events noted  pt calm and cooperative  seen at HD      Vital Signs Last 24 Hrs  T(C): 36.6 (04 Dec 2021 09:05), Max: 37.3 (03 Dec 2021 12:40)  T(F): 97.9 (04 Dec 2021 09:05), Max: 99.1 (03 Dec 2021 12:40)  HR: 102 (04 Dec 2021 09:05) (101 - 111)  BP: 119/69 (04 Dec 2021 09:05) (119/69 - 133/89)  BP(mean): --  RR: 17 (04 Dec 2021 09:05) (17 - 18)  SpO2: 96% (04 Dec 2021 09:05) (96% - 100%)    acetaminophen     Tablet .. 650 milliGRAM(s) Oral every 6 hours PRN  albuterol/ipratropium for Nebulization 3 milliLiter(s) Nebulizer every 6 hours PRN  atorvastatin 80 milliGRAM(s) Oral at bedtime  carbidopa/levodopa  25/100 2.5 Tablet(s) Oral <User Schedule>  carbidopa/levodopa  25/100 2 Tablet(s) Oral <User Schedule>  chlorhexidine 4% Liquid 1 Application(s) Topical <User Schedule>  cholecalciferol 1000 Unit(s) Oral daily  dextrose 5%. 1000 milliLiter(s) IV Continuous <Continuous>  dextrose 5%. 1000 milliLiter(s) IV Continuous <Continuous>  dextrose 50% Injectable 25 Gram(s) IV Push once  dextrose 50% Injectable 12.5 Gram(s) IV Push once  dextrose 50% Injectable 25 Gram(s) IV Push once  diVALproex Sprinkle 125 milliGRAM(s) Oral every 8 hours PRN  diVALproex Sprinkle 250 milliGRAM(s) Oral two times a day  folic acid 1 milliGRAM(s) Oral daily  glucagon  Injectable 1 milliGRAM(s) IntraMuscular once  guaiFENesin Oral Liquid (Sugar-Free) 200 milliGRAM(s) Oral every 6 hours PRN  insulin glargine Injectable (LANTUS) 8 Unit(s) SubCutaneous at bedtime  insulin lispro (ADMELOG) corrective regimen sliding scale   SubCutaneous three times a day before meals  insulin lispro (ADMELOG) corrective regimen sliding scale   SubCutaneous at bedtime  levothyroxine 25 MICROGram(s) Oral daily  memantine 5 milliGRAM(s) Oral at bedtime  metoprolol tartrate 25 milliGRAM(s) Oral two times a day  midodrine 10 milliGRAM(s) Oral <User Schedule>  Nephro-celeste 1 Tablet(s) Oral daily  polyethylene glycol 3350 17 Gram(s) Oral two times a day  QUEtiapine 12.5 milliGRAM(s) Oral three times a day  QUEtiapine 12.5 milliGRAM(s) Oral every 6 hours PRN  senna Syrup 10 milliLiter(s) Oral at bedtime  sodium chloride 0.9% lock flush 10 milliLiter(s) IV Push every 1 hour PRN  trihexyphenidyl 2 milliGRAM(s) Oral three times a day      PHYSICAL EXAM:  GENERAL: NAD,   EYES: conjunctiva and sclera clear  ENMT: Moist mucous membranes  NECK: Supple, No JVD, Normal thyroid  CHEST/LUNG: non labored, cta b/l  HEART: Regular rate and rhythm; No murmurs, rubs, or gallops  ABDOMEN: Soft, Nontender, Nondistended; Bowel sounds present  EXTREMITIES:  2+ Peripheral Pulses, No clubbing, cyanosis, or edema  LYMPH: No lymphadenopathy noted  SKIN: No rashes or lesions    Consultant(s) Notes Reviewed:  [x ] YES  [ ] NO  Care Discussed with Consultants/Other Providers [ x] YES  [ ] NO    LABS:                        9.3    8.95  )-----------( 195      ( 03 Dec 2021 07:35 )             31.1     12-03    133<L>  |  94<L>  |  44<H>  ----------------------------<  213<H>  4.1   |  22  |  4.42<H>    Ca    9.4      03 Dec 2021 07:38          CAPILLARY BLOOD GLUCOSE      POCT Blood Glucose.: 159 mg/dL (04 Dec 2021 07:46)  POCT Blood Glucose.: 169 mg/dL (03 Dec 2021 21:34)  POCT Blood Glucose.: 146 mg/dL (03 Dec 2021 17:04)  POCT Blood Glucose.: 208 mg/dL (03 Dec 2021 11:56)              RADIOLOGY & ADDITIONAL TESTS:    Imaging Personally Reviewed:  [x ] YES  [ ] NO

## 2021-12-04 NOTE — GOALS OF CARE CONVERSATION - ADVANCED CARE PLANNING - CONVERSATION DETAILS
Late note for 12/3 at around 12 noon.   As a coincidence, when I arrived to [address] [place] [address], one of the staff members indicated she was going to text me since Mr. Quintero's son was looking for me. I then went to talk to the patient's son that indicated he was looking for me since, he was advised by the primary team to " f/u with me regarding GOC." Anyway, I d/w his son regarding his understanding about the patient's situation and treatment options. His son did not recall having any recent d/w Renal; however, verbalized understanding about the patient's current HD treatment and concerns for the patient becoming agitated and then pulling on his HD catheter, which, his son understands can be deadly. We also discussed about the other treatment option, which will be stopping HD and allowing the patient to naturally die, understanding that, after stoping HD, prognosis for patient that are HD dependent is likely days. I indicated to his son that, at this point (that he is mainly bed bound, persistently confused and HD dependent), it was up to his HCP (Branden himself) and to his family to use substituted judgment to try to define what the patient may have wanted at this point, to prolong his life by continuing HD and trying to get to [facility]; however, understanding the risk of having a life threatening situation in the event he were to remove his HD catheter vs. stopping HD and focusing on his comfort. His son stated that it was a really difficult decisions and mainly because his father was alert and, sometimes, able to recognize him and other family members. Furthermore, his son believed that episodically, the patient was able to make some sense and engaging into meaningful conversations. I just don't want to "give up on him." His son ask for psych re-eval to see if there were any other adjustment on the patient's medications so his behavior can be better controlled.     I d/w the primary NP about the son's request and updated her about my GOC discussion with the patient's son. At this point, and as long as HD is deemed to be appropriate, it is really up to the patient's HCP to accept the risk of HD (including but no limited to self removal of the HD cath and facing a deadly situation) or to consider withdrawal of this treatment and focusing on symptoms Rx. However, based on my discussion above  it seem that continuing HD is the current goal. The primary team should be able to f/u on these discussions. Allocation of resources is to be based on goals of care, which, at this point, continue to be towards trying to improve the patient's condition, therefore, MILDRED and LTC seemed to be the next step.     The patient is already DNR but not DNI.     Once again I will sign off.         Dennis Juarez MD   Geriatrics and Palliative Care (GAP) Consult Service    of Geriatric and Palliative Medicine  [facility]      Please page the following number for clinical matters between the hours of 9 am and 5 pm from Monday through Friday : [phone]    After 5pm and on weekends, please see the contact information below:    In the event of newly developing, evolving, or worsening symptoms, please contact the Palliative Medicine team via pager (if the patient is at [facility] #[phone] or if the patient is at [facility] #[phone]) The Geriatric and Palliative Medicine service has coverage 24 hours a day/ 7 days a week to provide medical recommendations regarding symptom management needs via telephone Late note for 12/3 at around 12 noon.   As a coincidence, when I arrived to [address] [place] [address], one of the staff members indicated she was going to text me since Mr. Quintero's son was looking for me. I then went to talk to the patient's son that indicated he was looking for me since, he was advised by the primary team to " f/u with me regarding GOC." Anyway, I d/w his son regarding his understanding about the patient's situation and treatment options. His son did not recall having any recent d/w Renal; however, verbalized understanding about the patient's current HD treatment and concerns for the patient becoming agitated and then pulling on his HD catheter, which, his son understands can be deadly. We also discussed about the other treatment option, which will be stopping HD and allowing the patient to naturally die, understanding that, after stoping HD, prognosis for patient that are HD dependent is likely days. I indicated to his son that, at this point (that he is mainly bed bound, persistently confused and HD dependent), it was up to his HCP (Branden himself) and to his family to use substituted judgment to try to define what the patient may have wanted at this point, to prolong his life by continuing HD and trying to get to [facility]; however, understanding the risk of having a life threatening situation in the event he were to remove his HD catheter vs. stopping HD and focusing on his comfort. His son stated that it was a really difficult decisions and mainly because his father was alert and, sometimes, able to recognize him and other family members. Furthermore, his son believed that episodically, the patient was able to make some sense and engaging into meaningful conversations. I just don't want to "give up on him." His son ask for psych re-eval to see if there were any other adjustments on the patient's medications so his behavior can be better controlled.     I d/w the primary NP about the son's request and updated her about my GOC discussion with the patient's son. At this point, and as long as HD is deemed to be appropriate, it is really up to the patient's HCP to accept the risk of HD (including but no limited to self removal of the HD cath and facing a deadly situation) or to consider withdrawal of this treatment and focusing on symptoms Rx. However, based on my discussion above  it seems that continuing HD is the current goal. The primary team should be able to f/u on these discussions. Allocation of resources is to be based on goals of care, which, at this point, continue to be towards trying to improve the patient's condition, therefore, MILDRED and LTC seemed to be the next step.     The patient is already DNR but not DNI.     Once again I will sign off.         Dennis Juarez MD   Geriatrics and Palliative Care (GAP) Consult Service    of Geriatric and Palliative Medicine  [facility]      Please page the following number for clinical matters between the hours of 9 am and 5 pm from Monday through Friday : [phone]    After 5pm and on weekends, please see the contact information below:    In the event of newly developing, evolving, or worsening symptoms, please contact the Palliative Medicine team via pager (if the patient is at [facility] #[phone] or if the patient is at [facility] #[phone]) The Geriatric and Palliative Medicine service has coverage 24 hours a day/ 7 days a week to provide medical recommendations regarding symptom management needs via telephone

## 2021-12-04 NOTE — PROGRESS NOTE ADULT - SUBJECTIVE AND OBJECTIVE BOX
NEPHROLOGY    Patient seen and examined. Pt without complaints, no pain, no sob, comfortable on RA, currently in no acute distress. HD today removed 1.4L.     MEDICATIONS  (STANDING):  atorvastatin 80 milliGRAM(s) Oral at bedtime  carbidopa/levodopa  25/100 2.5 Tablet(s) Oral <User Schedule>  carbidopa/levodopa  25/100 2 Tablet(s) Oral <User Schedule>  chlorhexidine 4% Liquid 1 Application(s) Topical <User Schedule>  cholecalciferol 1000 Unit(s) Oral daily  dextrose 5%. 1000 milliLiter(s) (50 mL/Hr) IV Continuous <Continuous>  dextrose 5%. 1000 milliLiter(s) (100 mL/Hr) IV Continuous <Continuous>  dextrose 50% Injectable 25 Gram(s) IV Push once  dextrose 50% Injectable 12.5 Gram(s) IV Push once  dextrose 50% Injectable 25 Gram(s) IV Push once  diVALproex Sprinkle 250 milliGRAM(s) Oral two times a day  folic acid 1 milliGRAM(s) Oral daily  glucagon  Injectable 1 milliGRAM(s) IntraMuscular once  insulin glargine Injectable (LANTUS) 8 Unit(s) SubCutaneous at bedtime  insulin lispro (ADMELOG) corrective regimen sliding scale   SubCutaneous three times a day before meals  insulin lispro (ADMELOG) corrective regimen sliding scale   SubCutaneous at bedtime  levothyroxine 25 MICROGram(s) Oral daily  memantine 5 milliGRAM(s) Oral at bedtime  metoprolol tartrate 25 milliGRAM(s) Oral two times a day  midodrine 10 milliGRAM(s) Oral <User Schedule>  Nephro-celeste 1 Tablet(s) Oral daily  polyethylene glycol 3350 17 Gram(s) Oral two times a day  QUEtiapine 12.5 milliGRAM(s) Oral three times a day  senna Syrup 10 milliLiter(s) Oral at bedtime  trihexyphenidyl 2 milliGRAM(s) Oral three times a day    VITALS:  T(C): , Max: 36.8 (12-03-21 @ 20:19)  T(F): , Max: 98.3 (12-03-21 @ 20:19)  HR: 98 (12-04-21 @ 13:02)  BP: 126/77 (12-04-21 @ 13:02)  RR: 18 (12-04-21 @ 13:02)  SpO2: 100% (12-04-21 @ 13:02)    I and O's:    12-03 @ 07:01  -  12-04 @ 07:00  --------------------------------------------------------  IN: 290 mL / OUT: 0 mL / NET: 290 mL    12-04 @ 07:01  -  12-04 @ 16:14  --------------------------------------------------------  IN: 100 mL / OUT: 1400 mL / NET: -1300 mL    PHYSICAL EXAM:  Constitutional: NAD  Neck:  No JVD  Respiratory: CTAB/L  Cardiovascular: S1 and S2  Gastrointestinal: BS+, soft, NT/ND  Extremities: No peripheral edema  Neurological: limited, confused at times  Psychiatric: Normal mood, normal affect  : No Javier  Skin: No rashes  Access: avf and perm cath     LABS:                        9.3    8.95  )-----------( 195      ( 03 Dec 2021 07:35 )             31.1     12-03    133<L>  |  94<L>  |  44<H>  ----------------------------<  213<H>  4.1   |  22  |  4.42<H>    Ca    9.4      03 Dec 2021 07:38

## 2021-12-05 NOTE — PROGRESS NOTE ADULT - ASSESSMENT
Assessment  DMT2: 71y Male with DM T2 with hyperglycemia, A1C 6.4%, was on insulin at home, now on low-dose basal insulin and coverage, blood sugars are in acceptable range, no hypoglycemias. Patient is eating meals, NAD, DC delayed 2/2 psych/agitation, Palliative Care on board, s/p discussion with patient's son, patient is for possible MILDRED.  Hypothyroidism: Patient has no history thyroid disease, was not on any thyroid supplements, subclinical with low-normal FT4, lethargic, started on synthroid 25 mcg po daily, FT4 improved to 1.2.  CHF: on medications, stable, monitored.  HTN: Controlled,  on antihypertensive medications.  Parkinsons: on meds, monitored.  CKD: Monitor labs/BMP      Kelsie Reece MD  Cell: [phone]  Office: [phone]     Assessment  DMT2: 71y Male with DM T2 with hyperglycemia, A1C 6.4%, was on insulin at home, now on low-dose basal insulin and coverage, blood sugars are in acceptable range, no hypoglycemias. Patient is eating  meals, NAD, DC delayed 2/2 psych/agitation, Palliative Care on board, s/p discussion with patient's son, patient is for possible [facility]DRED.  Hypothyroidism: Patient has no history thyroid disease, was not on any thyroid supplements, subclinical with low-normal FT4, lethargic, started on synthroid 25 mcg po daily, FT4 improved to 1.2.  CHF: on medications, stable, monitored.  HTN: Controlled,  on antihypertensive medications.  Parkinsons: on meds, monitored.  CKD: Monitor labs/BMP      Kelsie Reece MD  Cell: [phone]  Office: [phone]

## 2021-12-05 NOTE — PROGRESS NOTE ADULT - ASSESSMENT
72yo M w/ PMHx of CAD (s/p CABG 2019), CKD (unknown stage), DM2, Parkinson's Disease, HTN, depression presents with new onset acute heart failure exacerbation, NSTEMI, started on hep gtt, developed bl RP bleed, acute anemia. sp MICU course for hemorrhagic shock, 10/28 sp IR embolization l4 and l5 lumbar artery. for permacath and AVF.    # Acute systolic and diastolic heart failure, improved  # NSTEMI - conservative mgmt dt renal function and anemia-bld loss  # ESRD, new HD  # Atrial flutter  # acute hypoxic resp failure, improved  # hemorrhagic shock, left RP hematoma, acute blood loss anemia  # lupus anticoagulant +  Echo TTE mod to severe LV SD, hypokinesis anterior wall, not candidate for cath, medically manage  10/28 sp IR embolization l4 and l5 lumbar artery, h/h stable  HD via permacath per renal, on midodrine during dialysis  sp L AVF - vascular removed staples but a couple remain, await maturation    # Parkinsons disease   # delirium  currently cannot discharge 2/2 behavior and agitation, risk of pulling out permacath   corrected, resumed on seroquel, cont on depakote  c/w trihexyphenidyl, carbidopa/levodopa   per neuro, probable early lewy body dementia related paranoia and agitation    # DM2 (diabetes mellitus, type 2)  per endo  hba1c 6.4    # CAD (coronary artery disease)  # HTN  cont lopressor  c/w atorvastatin  asa on hold  Midodrine pre-dialysis     PCP Dr. Simons    DNR/DNI    dispo: AMS improved on Seroquel however, there still remains the concern that patient would be agitated and pulls out permacath at outside facility, will need palliative to fu re goals of care     d/w son -(hcp) would like to continue HD, wish adequate behavior control w meds in order to prevent pulling put permacath    [facility]  [phone]

## 2021-12-05 NOTE — PROGRESS NOTE ADULT - SUBJECTIVE AND OBJECTIVE BOX
Chief complaint  Patient is a 71y old  Male who presents with a chief complaint of leg swelling (04 Dec 2021 18:09)   Review of systems  Patient in bed, looks comfortable, no hypoglycemic episodes.    Labs and Fingersticks  CAPILLARY BLOOD GLUCOSE      POCT Blood Glucose.: 103 mg/dL (05 Dec 2021 11:43)  POCT Blood Glucose.: 220 mg/dL (05 Dec 2021 07:43)  POCT Blood Glucose.: 168 mg/dL (04 Dec 2021 21:23)  POCT Blood Glucose.: 223 mg/dL (04 Dec 2021 16:40)                        Medications  MEDICATIONS  (STANDING):  atorvastatin 80 milliGRAM(s) Oral at bedtime  carbidopa/levodopa  25/100 2.5 Tablet(s) Oral <User Schedule>  carbidopa/levodopa  25/100 2 Tablet(s) Oral <User Schedule>  chlorhexidine 4% Liquid 1 Application(s) Topical <User Schedule>  cholecalciferol 1000 Unit(s) Oral daily  dextrose 5%. 1000 milliLiter(s) (50 mL/Hr) IV Continuous <Continuous>  dextrose 5%. 1000 milliLiter(s) (100 mL/Hr) IV Continuous <Continuous>  dextrose 50% Injectable 25 Gram(s) IV Push once  dextrose 50% Injectable 12.5 Gram(s) IV Push once  dextrose 50% Injectable 25 Gram(s) IV Push once  diVALproex Sprinkle 250 milliGRAM(s) Oral two times a day  folic acid 1 milliGRAM(s) Oral daily  glucagon  Injectable 1 milliGRAM(s) IntraMuscular once  insulin glargine Injectable (LANTUS) 8 Unit(s) SubCutaneous at bedtime  insulin lispro (ADMELOG) corrective regimen sliding scale   SubCutaneous three times a day before meals  insulin lispro (ADMELOG) corrective regimen sliding scale   SubCutaneous at bedtime  levothyroxine 25 MICROGram(s) Oral daily  memantine 5 milliGRAM(s) Oral at bedtime  metoprolol tartrate 25 milliGRAM(s) Oral two times a day  midodrine 10 milliGRAM(s) Oral <User Schedule>  Nephro-celeste 1 Tablet(s) Oral daily  polyethylene glycol 3350 17 Gram(s) Oral two times a day  QUEtiapine 12.5 milliGRAM(s) Oral three times a day  senna Syrup 10 milliLiter(s) Oral at bedtime  trihexyphenidyl 2 milliGRAM(s) Oral three times a day      Physical Exam  General: Patient comfortable in bed  Vital Signs Last 12 Hrs  T(F): 98.2 (12-05-21 @ 10:44), Max: 98.3 (12-05-21 @ 04:02)  HR: 101 (12-05-21 @ 10:44) (100 - 101)  BP: 129/84 (12-05-21 @ 10:44) (128/87 - 129/84)  BP(mean): --  RR: 18 (12-05-21 @ 10:44) (18 - 18)  SpO2: 100% (12-05-21 @ 10:44) (96% - 100%)  Neck: No palpable thyroid nodules.  CVS: S1S2, No murmurs  Respiratory: No wheezing, no crepitations  GI: Abdomen soft, bowel sounds positive  Musculoskeletal:  edema lower extremities.   Skin: No skin rashes, no ecchymosis    Diagnostics               Chief complaint  Patient is a 71y old  Male who presents with a chief complaint  of leg swelling (04 Dec 2021 18:09)   Review of systems  Patient in bed, looks comfortable, no hypoglycemic episodes.    Labs and Fingersticks  CAPILLARY BLOOD GLUCOSE      POCT Blood Glucose.: 103 mg/dL (05 Dec 2021 11:43)  POCT Blood Glucose.: 220 mg/dL (05 Dec 2021 07:43)  POCT Blood Glucose.: 168 mg/dL (04 Dec 2021 21:23)  POCT Blood Glucose.: 223 mg/dL (04 Dec 2021 16:40)                        Medications  MEDICATIONS  (STANDING):  atorvastatin 80 milliGRAM(s) Oral at bedtime  carbidopa/levodopa  25/100 2.5 Tablet(s) Oral <User Schedule>  carbidopa/levodopa  25/100 2 Tablet(s) Oral <User Schedule>  chlorhexidine 4% Liquid 1 Application(s) Topical <User Schedule>  cholecalciferol 1000 Unit(s) Oral daily  dextrose 5%. 1000 milliLiter(s) (50 mL/Hr) IV Continuous <Continuous>  dextrose 5%. 1000 milliLiter(s) (100 mL/Hr) IV Continuous <Continuous>  dextrose 50% Injectable 25 Gram(s) IV Push once  dextrose 50% Injectable 12.5 Gram(s) IV Push once  dextrose 50% Injectable 25 Gram(s) IV Push once  diVALproex Sprinkle 250 milliGRAM(s) Oral two times a day  folic acid 1 milliGRAM(s) Oral daily  glucagon  Injectable 1 milliGRAM(s) IntraMuscular once  insulin glargine Injectable (LANTUS) 8 Unit(s) SubCutaneous at bedtime  insulin lispro (ADMELOG) corrective regimen sliding scale   SubCutaneous three times a day before meals  insulin lispro (ADMELOG) corrective regimen sliding scale   SubCutaneous at bedtime  levothyroxine 25 MICROGram(s) Oral daily  memantine 5 milliGRAM(s) Oral at bedtime  metoprolol tartrate 25 milliGRAM(s) Oral two times a day  midodrine 10 milliGRAM(s) Oral <User Schedule>  Nephro-celeste 1 Tablet(s) Oral daily  polyethylene glycol 3350 17 Gram(s) Oral two times a day  QUEtiapine 12.5 milliGRAM(s) Oral three times a day  senna Syrup 10 milliLiter(s) Oral at bedtime  trihexyphenidyl 2 milliGRAM(s) Oral three times a day      Physical Exam  General: Patient comfortable in bed  Vital Signs Last 12 Hrs  T(F): 98.2 (12-05-21 @ 10:44), Max: 98.3 (12-05-21 @ 04:02)  HR: 101 (12-05-21 @ 10:44) (100 - 101)  BP: 129/84 (12-05-21 @ 10:44) (128/87 - 129/84)  BP(mean): --  RR: 18 (12-05-21 @ 10:44) (18 - 18)  SpO2: 100% (12-05-21 @ 10:44) (96% - 100%)  Neck: No palpable thyroid nodules.  CVS: S1S2, No murmurs  Respiratory: No wheezing, no crepitations  GI: Abdomen soft, bowel sounds positive  Musculoskeletal:  edema lower extremities.   Skin: No skin rashes, no ecchymosis    Diagnostics

## 2021-12-05 NOTE — PROGRESS NOTE ADULT - SUBJECTIVE AND OBJECTIVE BOX
Patient is a 71y old  Male who presents with a chief complaint of leg swelling (05 Dec 2021 14:42)      INTERVAL HPI/OVERNIGHT EVENTS: noted  pt seen and examined this am   events noted  calm and cooperative      Vital Signs Last 24 Hrs  T(C): 36.8 (05 Dec 2021 10:44), Max: 36.8 (05 Dec 2021 04:02)  T(F): 98.2 (05 Dec 2021 10:44), Max: 98.3 (05 Dec 2021 04:02)  HR: 105 (05 Dec 2021 17:37) (98 - 105)  BP: 131/80 (05 Dec 2021 17:37) (128/87 - 150/74)  BP(mean): --  RR: 18 (05 Dec 2021 17:37) (18 - 18)  SpO2: 98% (05 Dec 2021 17:37) (96% - 100%)    acetaminophen     Tablet .. 650 milliGRAM(s) Oral every 6 hours PRN  albuterol/ipratropium for Nebulization 3 milliLiter(s) Nebulizer every 6 hours PRN  atorvastatin 80 milliGRAM(s) Oral at bedtime  carbidopa/levodopa  25/100 2.5 Tablet(s) Oral <User Schedule>  carbidopa/levodopa  25/100 2 Tablet(s) Oral <User Schedule>  chlorhexidine 4% Liquid 1 Application(s) Topical <User Schedule>  cholecalciferol 1000 Unit(s) Oral daily  dextrose 5%. 1000 milliLiter(s) IV Continuous <Continuous>  dextrose 5%. 1000 milliLiter(s) IV Continuous <Continuous>  dextrose 50% Injectable 25 Gram(s) IV Push once  dextrose 50% Injectable 12.5 Gram(s) IV Push once  dextrose 50% Injectable 25 Gram(s) IV Push once  diVALproex Sprinkle 125 milliGRAM(s) Oral every 8 hours PRN  diVALproex Sprinkle 250 milliGRAM(s) Oral two times a day  folic acid 1 milliGRAM(s) Oral daily  glucagon  Injectable 1 milliGRAM(s) IntraMuscular once  guaiFENesin Oral Liquid (Sugar-Free) 200 milliGRAM(s) Oral every 6 hours PRN  insulin glargine Injectable (LANTUS) 8 Unit(s) SubCutaneous at bedtime  insulin lispro (ADMELOG) corrective regimen sliding scale   SubCutaneous three times a day before meals  insulin lispro (ADMELOG) corrective regimen sliding scale   SubCutaneous at bedtime  levothyroxine 25 MICROGram(s) Oral daily  memantine 5 milliGRAM(s) Oral at bedtime  metoprolol tartrate 25 milliGRAM(s) Oral two times a day  midodrine 10 milliGRAM(s) Oral <User Schedule>  Nephro-celeste 1 Tablet(s) Oral daily  polyethylene glycol 3350 17 Gram(s) Oral two times a day  QUEtiapine 12.5 milliGRAM(s) Oral three times a day  QUEtiapine 12.5 milliGRAM(s) Oral every 6 hours PRN  senna Syrup 10 milliLiter(s) Oral at bedtime  sodium chloride 0.9% lock flush 10 milliLiter(s) IV Push every 1 hour PRN  trihexyphenidyl 2 milliGRAM(s) Oral three times a day      PHYSICAL EXAM:  GENERAL: NAD,   EYES: conjunctiva and sclera clear  ENMT: Moist mucous membranes  NECK: Supple, No JVD, Normal thyroid  CHEST/LUNG: non labored, cta b/l  HEART: Regular rate and rhythm; No murmurs, rubs, or gallops  ABDOMEN: Soft, Nontender, Nondistended; Bowel sounds present  EXTREMITIES:  2+ Peripheral Pulses, No clubbing, cyanosis, or edema  LYMPH: No lymphadenopathy noted  SKIN: No rashes or lesions    Consultant(s) Notes Reviewed:  [x ] YES  [ ] NO  Care Discussed with Consultants/Other Providers [ x] YES  [ ] NO    LABS:              CAPILLARY BLOOD GLUCOSE      POCT Blood Glucose.: 183 mg/dL (05 Dec 2021 16:48)  POCT Blood Glucose.: 103 mg/dL (05 Dec 2021 11:43)  POCT Blood Glucose.: 220 mg/dL (05 Dec 2021 07:43)  POCT Blood Glucose.: 168 mg/dL (04 Dec 2021 21:23)              RADIOLOGY & ADDITIONAL TESTS:    Imaging Personally Reviewed:  [x ] YES  [ ] NO

## 2021-12-06 NOTE — PROGRESS NOTE ADULT - SUBJECTIVE AND OBJECTIVE BOX
Patient is a 71y old  Male who presents with a chief complaint of leg swelling (06 Dec 2021 13:17)      INTERVAL HPI/OVERNIGHT EVENTS: noted  pt seen and examined this am   events noted  pt was calm and sleepy during days, at nights refuses treatment at times  remains restraints      Vital Signs Last 24 Hrs  T(C): 37 (06 Dec 2021 10:10), Max: 37 (06 Dec 2021 02:06)  T(F): 98.6 (06 Dec 2021 10:10), Max: 98.6 (06 Dec 2021 02:06)  HR: 108 (06 Dec 2021 10:10) (105 - 120)  BP: 106/67 (06 Dec 2021 10:10) (106/67 - 133/87)  BP(mean): --  RR: 18 (06 Dec 2021 10:10) (18 - 18)  SpO2: 97% (06 Dec 2021 10:10) (97% - 100%)    acetaminophen     Tablet .. 650 milliGRAM(s) Oral every 6 hours PRN  albuterol/ipratropium for Nebulization 3 milliLiter(s) Nebulizer every 6 hours PRN  atorvastatin 80 milliGRAM(s) Oral at bedtime  carbidopa/levodopa  25/100 2.5 Tablet(s) Oral <User Schedule>  carbidopa/levodopa  25/100 2 Tablet(s) Oral <User Schedule>  chlorhexidine 4% Liquid 1 Application(s) Topical <User Schedule>  cholecalciferol 1000 Unit(s) Oral daily  dextrose 5%. 1000 milliLiter(s) IV Continuous <Continuous>  dextrose 5%. 1000 milliLiter(s) IV Continuous <Continuous>  dextrose 50% Injectable 25 Gram(s) IV Push once  dextrose 50% Injectable 12.5 Gram(s) IV Push once  dextrose 50% Injectable 25 Gram(s) IV Push once  diVALproex Sprinkle 125 milliGRAM(s) Oral every 8 hours PRN  diVALproex Sprinkle 250 milliGRAM(s) Oral two times a day  folic acid 1 milliGRAM(s) Oral daily  glucagon  Injectable 1 milliGRAM(s) IntraMuscular once  guaiFENesin Oral Liquid (Sugar-Free) 200 milliGRAM(s) Oral every 6 hours PRN  insulin glargine Injectable (LANTUS) 8 Unit(s) SubCutaneous at bedtime  insulin lispro (ADMELOG) corrective regimen sliding scale   SubCutaneous three times a day before meals  insulin lispro (ADMELOG) corrective regimen sliding scale   SubCutaneous at bedtime  levothyroxine 25 MICROGram(s) Oral daily  memantine 5 milliGRAM(s) Oral at bedtime  metoprolol tartrate 25 milliGRAM(s) Oral two times a day  midodrine 10 milliGRAM(s) Oral <User Schedule>  Nephro-celeste 1 Tablet(s) Oral daily  polyethylene glycol 3350 17 Gram(s) Oral two times a day  QUEtiapine 12.5 milliGRAM(s) Oral three times a day  QUEtiapine 12.5 milliGRAM(s) Oral every 6 hours PRN  senna Syrup 10 milliLiter(s) Oral at bedtime  sodium chloride 0.9% lock flush 10 milliLiter(s) IV Push every 1 hour PRN  trihexyphenidyl 2 milliGRAM(s) Oral three times a day      PHYSICAL EXAM:  GENERAL: NAD,   EYES: conjunctiva and sclera clear  ENMT: Moist mucous membranes  NECK: Supple, No JVD, Normal thyroid  CHEST/LUNG: non labored, cta b/l  HEART: Regular rate and rhythm; No murmurs, rubs, or gallops  ABDOMEN: Soft, Nontender, Nondistended; Bowel sounds present  EXTREMITIES:  2+ Peripheral Pulses, No clubbing, cyanosis, or edema  LYMPH: No lymphadenopathy noted  SKIN: No rashes or lesions    Consultant(s) Notes Reviewed:  [x ] YES  [ ] NO  Care Discussed with Consultants/Other Providers [ x] YES  [ ] NO    LABS:                        8.5    10.49 )-----------( 255      ( 06 Dec 2021 04:59 )             28.1     12-06    133<L>  |  94<L>  |  67<H>  ----------------------------<  114<H>  4.8   |  21<L>  |  6.03<H>    Ca    9.9      06 Dec 2021 04:59          CAPILLARY BLOOD GLUCOSE      POCT Blood Glucose.: 155 mg/dL (06 Dec 2021 12:02)  POCT Blood Glucose.: 201 mg/dL (06 Dec 2021 07:58)  POCT Blood Glucose.: 181 mg/dL (05 Dec 2021 22:39)  POCT Blood Glucose.: 174 mg/dL (05 Dec 2021 21:11)  POCT Blood Glucose.: 183 mg/dL (05 Dec 2021 16:48)              RADIOLOGY & ADDITIONAL TESTS:    Imaging Personally Reviewed:  [x ] YES  [ ] NO

## 2021-12-06 NOTE — PROGRESS NOTE ADULT - ASSESSMENT
Assessment  DMT2: 71y Male with DM T2 with hyperglycemia, A1C 6.4%, was on insulin at home, now on low-dose basal insulin and coverage, blood sugars trending within overall acceptable range, no hypoglycemias, eating meals, appears comfortable and calm today, for possible MILDRED.  Hypothyroidism: Patient has no history thyroid disease, was not on any thyroid supplements, subclinical with low-normal FT4, lethargic, started on synthroid 25 mcg po daily, FT4 improved to 1.2.  CHF: on medications, stable, monitored.  HTN: Controlled,  on antihypertensive medications.  Parkinsons: on meds, monitored.  CKD: Monitor labs/BMP      Kelsie Reece MD  Cell: [phone]  Office: [phone]     Assessment  DMT2: 71y Male with DM T2 with hyperglycemia, A1C 6.4%, was on insulin at home, now on low-dose basal insulin and coverage, blood sugars trending within overall acceptable range, no hypoglycemias, eating meals, appears comfortable and  calm today, for possible MILDRED.  Hypothyroidism: Patient has no history thyroid disease, was not on any thyroid supplements, subclinical with low-normal FT4, lethargic, started on synthroid 25 mcg po daily, FT4 improved to 1.2.  CHF: on medications, stable, monitored.  HTN: Controlled,  on antihypertensive medications.  Parkinsons: on meds, monitored.  CKD: Monitor labs/BMP      Kelsie Reece MD  Cell: [phone]  Office: [phone]

## 2021-12-06 NOTE — PROGRESS NOTE ADULT - SUBJECTIVE AND OBJECTIVE BOX
Chief complaint  Patient is a 71y old  Male who presents with a chief complaint of leg swelling (06 Dec 2021 13:59)   Review of systems  Patient in bed, looks comfortable, no hypoglycemic episodes.    Labs and Fingersticks  CAPILLARY BLOOD GLUCOSE      POCT Blood Glucose.: 155 mg/dL (06 Dec 2021 12:02)  POCT Blood Glucose.: 201 mg/dL (06 Dec 2021 07:58)  POCT Blood Glucose.: 181 mg/dL (05 Dec 2021 22:39)  POCT Blood Glucose.: 174 mg/dL (05 Dec 2021 21:11)  POCT Blood Glucose.: 183 mg/dL (05 Dec 2021 16:48)      Anion Gap, Serum: 18 *H* (12-06 @ 04:59)      Calcium, Total Serum: 9.9 (12-06 @ 04:59)          12-06    133<L>  |  94<L>  |  67<H>  ----------------------------<  114<H>  4.8   |  21<L>  |  6.03<H>    Ca    9.9      06 Dec 2021 04:59                          8.5    10.49 )-----------( 255      ( 06 Dec 2021 04:59 )             28.1     Medications  MEDICATIONS  (STANDING):  atorvastatin 80 milliGRAM(s) Oral at bedtime  carbidopa/levodopa  25/100 2.5 Tablet(s) Oral <User Schedule>  carbidopa/levodopa  25/100 2 Tablet(s) Oral <User Schedule>  chlorhexidine 4% Liquid 1 Application(s) Topical <User Schedule>  cholecalciferol 1000 Unit(s) Oral daily  dextrose 5%. 1000 milliLiter(s) (50 mL/Hr) IV Continuous <Continuous>  dextrose 5%. 1000 milliLiter(s) (100 mL/Hr) IV Continuous <Continuous>  dextrose 50% Injectable 25 Gram(s) IV Push once  dextrose 50% Injectable 12.5 Gram(s) IV Push once  dextrose 50% Injectable 25 Gram(s) IV Push once  diVALproex Sprinkle 250 milliGRAM(s) Oral two times a day  folic acid 1 milliGRAM(s) Oral daily  glucagon  Injectable 1 milliGRAM(s) IntraMuscular once  insulin glargine Injectable (LANTUS) 8 Unit(s) SubCutaneous at bedtime  insulin lispro (ADMELOG) corrective regimen sliding scale   SubCutaneous three times a day before meals  insulin lispro (ADMELOG) corrective regimen sliding scale   SubCutaneous at bedtime  levothyroxine 25 MICROGram(s) Oral daily  memantine 5 milliGRAM(s) Oral at bedtime  metoprolol tartrate 25 milliGRAM(s) Oral two times a day  midodrine 10 milliGRAM(s) Oral <User Schedule>  Nephro-celeste 1 Tablet(s) Oral daily  polyethylene glycol 3350 17 Gram(s) Oral two times a day  QUEtiapine 12.5 milliGRAM(s) Oral three times a day  senna Syrup 10 milliLiter(s) Oral at bedtime  trihexyphenidyl 2 milliGRAM(s) Oral three times a day      Physical Exam  General: Patient comfortable in bed  Vital Signs Last 12 Hrs  T(F): 98.6 (12-06-21 @ 10:10), Max: 98.6 (12-06-21 @ 10:10)  HR: 108 (12-06-21 @ 10:10) (108 - 120)  BP: 106/67 (12-06-21 @ 10:10) (106/67 - 128/83)  BP(mean): --  RR: 18 (12-06-21 @ 10:10) (18 - 18)  SpO2: 97% (12-06-21 @ 10:10) (97% - 97%)  Neck: No palpable thyroid nodules.  CVS: S1S2, No murmurs  Respiratory: No wheezing, no crepitations  GI: Abdomen soft, bowel sounds positive  Musculoskeletal:  edema lower extremities.   Skin: No skin rashes, no ecchymosis    Diagnostics             Chief complaint  Patient is a 71y old  Male who presents with a chief complaint of leg swelling (06 Dec 2021 13:59)   Review of systems  Patient in bed, looks comfortable, no hypoglycemic episodes.    Labs and Fingersticks  CAPILLARY BLOOD GLUCOSE      POCT Blood Glucose.: 155 mg/dL (06 Dec 2021 12:02)  POCT Blood Glucose.: 201 mg/dL (06 Dec 2021 07:58)  POCT Blood Glucose.: 181 mg/dL (05 Dec 2021 22:39)  POCT Blood Glucose.: 174 mg/dL (05 Dec 2021 21:11)  POCT Blood Glucose.: 183 mg/dL (05 Dec 2021 16:48)      Anion Gap, Serum: 18 *H* (12-06 @ 04:59)      Calcium, Total Serum: 9.9 (12-06 @ 04:59)          12-06    133<L>  |  94<L>  |  67<H>  ----------------------------<  114<H>  4.8   |  21<L>  |  6.03<H>    Ca    9.9      06 Dec 2021 04:59                          8.5    10.49 )-----------( 255      ( 06 Dec 2021 04:59 )             28.1     Medications  MEDICATIONS  (STANDING):  atorvastatin 80 milliGRAM(s) Oral at bedtime  carbidopa/levodopa  25/100 2.5 Tablet(s) Oral <User Schedule>  carbidopa/levodopa  25/100 2 Tablet(s) Oral <User Schedule>  chlorhexidine 4% Liquid 1 Application(s) Topical <User Schedule>  cholecalciferol 1000 Unit(s) Oral daily  dextrose 5%. 1000 milliLiter(s) (50 mL/Hr) IV Continuous <Continuous>  dextrose 5%. 1000 milliLiter(s) (100 mL/Hr) IV Continuous <Continuous>  dextrose 50% Injectable 25 Gram(s) IV Push once  dextrose 50% Injectable 12.5 Gram(s) IV Push once  dextrose 50% Injectable 25 Gram(s) IV Push once  diVALproex Sprinkle 250 milliGRAM(s) Oral two times a day  folic acid 1 milliGRAM(s) Oral daily  glucagon  Injectable 1 milliGRAM(s) IntraMuscular once  insulin glargine Injectable (LANTUS) 8 Unit(s) SubCutaneous at bedtime  insulin lispro (ADMELOG) corrective regimen sliding scale   SubCutaneous three times a day before meals  insulin lispro (ADMELOG) corrective regimen sliding scale   SubCutaneous at bedtime  levothyroxine 25 MICROGram(s) Oral daily  memantine 5 milliGRAM(s) Oral at bedtime  metoprolol tartrate 25 milliGRAM(s) Oral two times a day  midodrine 10 milliGRAM(s) Oral <User Schedule>  Nephro-celeste 1 Tablet(s) Oral daily  polyethylene glycol 3350 17 Gram(s) Oral two times a day  QUEtiapine 12.5 milliGRAM(s) Oral three times a day  senna Syrup 10 milliLiter(s) Oral at bedtime  trihexyphenidyl 2 milliGRAM(s) Oral three times a day      Physical Exam  General: Patient comfortable in bed  Vital Signs Last 12 Hrs  T(F): 98.6 (12-06-21 @ 10:10), Max: 98.6 (12-06-21 @ 10:10)  HR: 108 (12-06-21 @ 10:10) (108 - 120)  BP: 106/67 (12-06-21 @ 10:10) (106/67 - 128/83)  BP(mean): --  RR: 18 (12-06-21 @ 10:10) (18 - 18)  SpO2: 97% (12-06-21 @ 10:10) (97% - 97%)  Neck: No palpable thyroid nodules.  CVS: S1S2, No murmurs  Respiratory: No wheezing, no crepitations  GI: Abdomen soft, bowel sounds positive  Musculoskeletal:  edema lower extremities.   Skin: No skin rashes, no ecchymosis    Diagnostics

## 2021-12-06 NOTE — PROGRESS NOTE ADULT - ASSESSMENT
72yo M w/ PMHx of CAD (s/p CABG 2019), CKD (unknown stage), DM2, Parkinson's Disease, HTN, depression presents with new onset acute heart failure exacerbation, NSTEMI, started on hep gtt, developed bl RP bleed, acute anemia. sp MICU course for hemorrhagic shock, 10/28 sp IR embolization l4 and l5 lumbar artery. for permacath and AVF.    # Acute systolic and diastolic heart failure, improved  # NSTEMI - conservative mgmt dt renal function and anemia-bld loss  # ESRD, new HD  # Atrial flutter  # acute hypoxic resp failure, improved  # hemorrhagic shock, left RP hematoma, acute blood loss anemia  # lupus anticoagulant +  Echo TTE mod to severe LV SD, hypokinesis anterior wall, not candidate for cath, medically manage  10/28 sp IR embolization l4 and l5 lumbar artery, h/h stable  HD via permacath per renal, on midodrine during dialysis  sp L AVF - vascular removed staples but a couple remain, await maturation    # Parkinsons disease   # delirium  currently cannot discharge 2/2 behavior and agitation, risk of pulling out permacath   corrected, resumed on seroquel, cont on depakote  c/w trihexyphenidyl, carbidopa/levodopa   per neuro, probable early lewy body dementia related paranoia and agitation    # DM2 (diabetes mellitus, type 2)  per endo  hba1c 6.4    # CAD (coronary artery disease)  # HTN  cont lopressor  c/w atorvastatin  asa on hold  Midodrine pre-dialysis     PCP Dr. Simons    DNR/DNI    dispo: AMS improved on Seroquel however, there still remains the concern that patient would be agitated and pulls out permacath at outside facility, will need palliative to fu re goals of care     d/w son -(hcp) and dgtrin law at length again today - would like to continue HD, wish adequate behavior control w meds in order to prevent pulling put permacath  psych reconsult re: furthur mgmt, off restraints  d/w nurse mngr    [facility]  [phone]

## 2021-12-06 NOTE — PROGRESS NOTE ADULT - SUBJECTIVE AND OBJECTIVE BOX
NEPHROLOGY-[facility] [phone]        Patient seen and examined in bed.  He was about the same         MEDICATIONS  (STANDING):  atorvastatin 80 milliGRAM(s) Oral at bedtime  carbidopa/levodopa  25/100 2.5 Tablet(s) Oral <User Schedule>  carbidopa/levodopa  25/100 2 Tablet(s) Oral <User Schedule>  chlorhexidine 4% Liquid 1 Application(s) Topical <User Schedule>  cholecalciferol 1000 Unit(s) Oral daily  dextrose 5%. 1000 milliLiter(s) (50 mL/Hr) IV Continuous <Continuous>  dextrose 5%. 1000 milliLiter(s) (100 mL/Hr) IV Continuous <Continuous>  dextrose 50% Injectable 25 Gram(s) IV Push once  dextrose 50% Injectable 12.5 Gram(s) IV Push once  dextrose 50% Injectable 25 Gram(s) IV Push once  diVALproex Sprinkle 250 milliGRAM(s) Oral two times a day  folic acid 1 milliGRAM(s) Oral daily  glucagon  Injectable 1 milliGRAM(s) IntraMuscular once  insulin glargine Injectable (LANTUS) 8 Unit(s) SubCutaneous at bedtime  insulin lispro (ADMELOG) corrective regimen sliding scale   SubCutaneous three times a day before meals  insulin lispro (ADMELOG) corrective regimen sliding scale   SubCutaneous at bedtime  levothyroxine 25 MICROGram(s) Oral daily  memantine 5 milliGRAM(s) Oral at bedtime  metoprolol tartrate 25 milliGRAM(s) Oral two times a day  midodrine 10 milliGRAM(s) Oral <User Schedule>  Nephro-celeste 1 Tablet(s) Oral daily  polyethylene glycol 3350 17 Gram(s) Oral two times a day  QUEtiapine 12.5 milliGRAM(s) Oral three times a day  senna Syrup 10 milliLiter(s) Oral at bedtime  trihexyphenidyl 2 milliGRAM(s) Oral three times a day      VITAL:  T(C): , Max: 37 (12-06-21 @ 02:06)  T(F): , Max: 98.6 (12-06-21 @ 02:06)  HR: 120 (12-06-21 @ 04:00)  BP: 128/83 (12-06-21 @ 04:00)  BP(mean): --  RR: 18 (12-06-21 @ 04:00)  SpO2: 97% (12-06-21 @ 04:00)  Wt(kg): --    I and O's:    12-05 @ 07:01  -  12-06 @ 07:00  --------------------------------------------------------  IN: 780 mL / OUT: 0 mL / NET: 780 mL          PHYSICAL EXAM:    Constitutional: NAD  Neck:  No JVD  Respiratory: poor effort   Cardiovascular: S1 and S2  Gastrointestinal: BS+, soft, NT/ND  Extremities: No peripheral edema  Neurological:   no focal deficits  Psychiatric: Normal mood, normal affect  : No Javier  Skin: No rashes  Access: avf and perm cath   LABS:                        8.5    10.49 )-----------( 255      ( 06 Dec 2021 04:59 )             28.1     12-06    133<L>  |  94<L>  |  67<H>  ----------------------------<  114<H>  4.8   |  21<L>  |  6.03<H>    Ca    9.9      06 Dec 2021 04:59            Urine Studies:          RADIOLOGY & ADDITIONAL STUDIES:          < from: Xray Chest 1 View- PORTABLE-Urgent (Xray Chest 1 View- PORTABLE-Urgent .) (11.23.21 @ 14:47) >    EXAM:  XR CHEST PORTABLE URGENT 1V                            PROCEDURE DATE:  11/23/2021            INTERPRETATION:  TIME OF EXAM: November 23, 2021 at 2:45 PM.    CLINICAL INFORMATION: Permacath position.    COMPARISON:  November 2, 2021.    TECHNIQUE:   AP Portable chest x-ray. Apices excluded from image.    INTERPRETATION:    Heart size and the mediastinum cannot be accurately evaluated on this projection. Median sternotomy sutures and surgical clips again seen.  Previous enteric tube not seen.  Right IJ approach permacath with tip in the right atrium.  The included right lung is clear.  Left basilar/retrocardiac opacity with obscuration of the left hemidiaphragm is not significantly changed.  No right pleural effusion seen.  No pneumothorax noted however the apices are excluded and not evaluated.  There is osteoarthritic degenerative change of the spine.      IMPRESSION:  Right IJ approach permacath with tip in the right atrium.    Left basilar and retrocardiac which may be due to a left pleural effusion with passive atelectasis, atelectasis of other cause, and/or pneumonia, not significantly changed.    --- End of Report ---                BOBBY MATA MD; Attending Radiologist  This document has been electronically signed. Nov 23 2021  3:22PM    < end of copied text >

## 2021-12-06 NOTE — PROGRESS NOTE ADULT - ASSESSMENT
Impression:    Incontinence of bowel and Bladder  Incontinence Dermatitis  Pressure Ulcer Prophylaxis    Recommend:  1.) topical therapy: sacral/buttock  - cleanse with with incontinence cleanser, pat dry, apply Triad ointment twice daily and PRN for incontinent episodes  2.) Incontinence Management - incontinence cleanser, pads, pericare BID  3.) Maintain on an alternating air with low air loss surface  4.) Turn and reposition Q 2 hours  5.) Nutrition optimization  6.) Offload heels/feet with complete cair air fluidized boots; ensure that the soles of the feet are not resting on the foot board of the bed.    Care as per medicine. Will not follow but will remain available. Please recall for new issues or deterioration.  Upon discharge f[facility] [address] [place] [address] [phone]  Thank you for this consult  Chelsey Cook, NP-C, [facility] [id number]

## 2021-12-06 NOTE — PROGRESS NOTE ADULT - SUBJECTIVE AND OBJECTIVE BOX
Wound Surgery Consult Note:    HPI:  72yo M w/ PMHx of CAD (s/p CABG 2019), CKD (unknown stage), DM2, Parkinson's Disease, HTN, depression presents with bilateral leg swelling, patient's daughter in-law at bedside Yoly Quintero ([address] [place] [address] Nurse) provides the history, the daughter reports the patient is a poor historian, unable to remember having conversations with others and likely cannot weigh risk/benefits of medical conditions, she reports that he has had bilateral lower extremity swelling for the past few months, but over the past 2 weeks it has significantly worsened, he has further difficulty ambulating due to swelling, his outpatient provider attempted to manage with 20mg lasix and then increased to 40mg lasix but the patient never took the increased dose, his symptoms ar persistent throughout the day and are extremely severe, he has developed wounds of the legs with weeping of fluid due to the swelling, she reports that the patient is frequently non-compliant with medications and medical management, he lives at home with his son and daughter in law, he denies chest pain, shortness of breath, fever/chills, cough, sputum, in the ED, he was tachycardic but hemodynamically stable, afebrile, saturating well on room air, labs were significant for elevated BNP, elevated creatinine, imaging showed moderate left pleural effusion, patient was admitted to general medicine for further management  (21 Oct 2021 07:06)    Request for wound care consult for sacral/bilateral buttocks skin breakdown received from nursing. Mr. Quintero was encountered on an alternating air with low air loss surface. He is incontinent of stool and urine. He was unable turn and reposition self in bed. His extreme immobility, inactivity, incontinence of urine and stool as well as poor nutritional status all contribute to his high risk for pressure injury development and hinder healing.    PAST MEDICAL & SURGICAL HISTORY:  Hypertension  Diabetes Mellitus, Type 2  Hypercholesterolemia  Parkinson disease  CAD (coronary artery disease), s/p 1 stent in 2010 and CABG 2019  S/P CABG (coronary artery bypass graft), 2019  S/P hip replacement, right, 2016  Anxiety  Depression, major    MEDICATIONS  (STANDING):  atorvastatin 80 milliGRAM(s) Oral at bedtime  carbidopa/levodopa  25/100 2.5 Tablet(s) Oral <User Schedule>  carbidopa/levodopa  25/100 2 Tablet(s) Oral <User Schedule>  chlorhexidine 4% Liquid 1 Application(s) Topical <User Schedule>  cholecalciferol 1000 Unit(s) Oral daily  dextrose 5%. 1000 milliLiter(s) (50 mL/Hr) IV Continuous <Continuous>  dextrose 5%. 1000 milliLiter(s) (100 mL/Hr) IV Continuous <Continuous>  dextrose 50% Injectable 25 Gram(s) IV Push once  dextrose 50% Injectable 12.5 Gram(s) IV Push once  dextrose 50% Injectable 25 Gram(s) IV Push once  diVALproex Sprinkle 250 milliGRAM(s) Oral two times a day  folic acid 1 milliGRAM(s) Oral daily  glucagon  Injectable 1 milliGRAM(s) IntraMuscular once  insulin glargine Injectable (LANTUS) 8 Unit(s) SubCutaneous at bedtime  insulin lispro (ADMELOG) corrective regimen sliding scale   SubCutaneous three times a day before meals  insulin lispro (ADMELOG) corrective regimen sliding scale   SubCutaneous at bedtime  levothyroxine 25 MICROGram(s) Oral daily  memantine 5 milliGRAM(s) Oral at bedtime  metoprolol tartrate 25 milliGRAM(s) Oral two times a day  midodrine 10 milliGRAM(s) Oral <User Schedule>  Nephro-celeste 1 Tablet(s) Oral daily  polyethylene glycol 3350 17 Gram(s) Oral two times a day  QUEtiapine 12.5 milliGRAM(s) Oral three times a day  senna Syrup 10 milliLiter(s) Oral at bedtime  trihexyphenidyl 2 milliGRAM(s) Oral three times a day    MEDICATIONS  (PRN):  acetaminophen     Tablet .. 650 milliGRAM(s) Oral every 6 hours PRN Mild Pain (1 - 3)  albuterol/ipratropium for Nebulization 3 milliLiter(s) Nebulizer every 6 hours PRN Shortness of Breath and/or Wheezing  diVALproex Sprinkle 125 milliGRAM(s) Oral every 8 hours PRN Agitation  guaiFENesin Oral Liquid (Sugar-Free) 200 milliGRAM(s) Oral every 6 hours PRN Cough  QUEtiapine 12.5 milliGRAM(s) Oral every 6 hours PRN agitation  sodium chloride 0.9% lock flush 10 milliLiter(s) IV Push every 1 hour PRN Pre/post blood products, medications, blood draw, and to maintain line patency    Allergies    adhesives (Rash)  latex (Urticaria)  No Known Drug Allergies    Intolerances    Vital Signs Last 24 Hrs  T(C): 37 (06 Dec 2021 10:10), Max: 37 (06 Dec 2021 02:06)  T(F): 98.6 (06 Dec 2021 10:10), Max: 98.6 (06 Dec 2021 02:06)  HR: 108 (06 Dec 2021 10:10) (105 - 120)  BP: 106/67 (06 Dec 2021 10:10) (106/67 - 133/87)  BP(mean): --  RR: 18 (06 Dec 2021 10:10) (18 - 18)  SpO2: 97% (06 Dec 2021 10:10) (97% - 100%)    Physical Exam:  General: Obtunded   Respiratory: no SOB on room air  Gastrointestinal: soft NT/ND  Neurology: nonverbal, not following commands  Musculoskeletal: no contractures  Vascular: BLE edema equal  Skin:  Sacrum and bilateral buttocks with intact skin without maceration or erythema  Bilateral legs with dry, flaking skin with no open wounds, no drainage  No odor, erythema, increased warmth, tenderness, induration, fluctuance    LABS:  12-06    133<L>  |  94<L>  |  67<H>  ----------------------------<  114<H>  4.8   |  21<L>  |  6.03<H>    Ca    9.9      06 Dec 2021 04:59                            8.5    10.49 )-----------( 255      ( 06 Dec 2021 04:59 )             28.1

## 2021-12-07 NOTE — PROGRESS NOTE ADULT - ASSESSMENT
Assessment  DMT2: 71y Male with DM T2 with hyperglycemia, A1C 6.4%, was on insulin at home, now on low-dose basal insulin and coverage, blood sugars trending within overall acceptable range, no hypoglycemias, eating meals, appears comfortable in NAD.  Hypothyroidism: Patient has no history thyroid disease, was not on any thyroid supplements, subclinical with low-normal FT4, lethargic, started on synthroid 25 mcg po daily, FT4 improved to 1.2. Had tachycardia..  CHF: on medications, stable, monitored.  HTN: Controlled,  on antihypertensive medications.  Parkinsons: on meds, monitored.  CKD: Monitor labs/BMP      Kelsie Reece MD  Cell: [phone]  Office: [phone]     Assessment  DMT2: 71y Male with DM T2 with hyperglycemia, A1C 6.4%, was on insulin at home, now on low-dose basal insulin and coverage, blood sugars trending within overall acceptable range, no hypoglycemias, eating  meals, appears comfortable in NAD.  Hypothyroidism: Patient has no history thyroid disease, was not on any thyroid supplements, subclinical with low-normal FT4, lethargic, started on synthroid 25 mcg po daily, FT4 improved to 1.2. Had tachycardia..  CHF: on medications, stable, monitored.  HTN: Controlled,  on antihypertensive medications.  Parkinsons: on meds, monitored.  CKD: Monitor labs/BMP      Kelsie Reece MD  Cell: [phone]  Office: [phone]

## 2021-12-07 NOTE — PROGRESS NOTE ADULT - SUBJECTIVE AND OBJECTIVE BOX
NEPHROLOGY-[facility] [phone]        Patient seen and examined in bed.  He was in good spirits         MEDICATIONS  (STANDING):  atorvastatin 80 milliGRAM(s) Oral at bedtime  carbidopa/levodopa  25/100 2.5 Tablet(s) Oral <User Schedule>  carbidopa/levodopa  25/100 2 Tablet(s) Oral <User Schedule>  chlorhexidine 4% Liquid 1 Application(s) Topical <User Schedule>  cholecalciferol 1000 Unit(s) Oral daily  dextrose 5%. 1000 milliLiter(s) (50 mL/Hr) IV Continuous <Continuous>  dextrose 5%. 1000 milliLiter(s) (100 mL/Hr) IV Continuous <Continuous>  dextrose 50% Injectable 25 Gram(s) IV Push once  dextrose 50% Injectable 12.5 Gram(s) IV Push once  dextrose 50% Injectable 25 Gram(s) IV Push once  diVALproex Sprinkle 250 milliGRAM(s) Oral two times a day  DULoxetine 20 milliGRAM(s) Oral daily  folic acid 1 milliGRAM(s) Oral daily  glucagon  Injectable 1 milliGRAM(s) IntraMuscular once  insulin glargine Injectable (LANTUS) 8 Unit(s) SubCutaneous at bedtime  insulin lispro (ADMELOG) corrective regimen sliding scale   SubCutaneous three times a day before meals  insulin lispro (ADMELOG) corrective regimen sliding scale   SubCutaneous at bedtime  levothyroxine 25 MICROGram(s) Oral daily  memantine 5 milliGRAM(s) Oral at bedtime  metoprolol tartrate 25 milliGRAM(s) Oral two times a day  midodrine 10 milliGRAM(s) Oral <User Schedule>  Nephro-celeste 1 Tablet(s) Oral daily  polyethylene glycol 3350 17 Gram(s) Oral two times a day  QUEtiapine 12.5 milliGRAM(s) Oral three times a day  senna Syrup 10 milliLiter(s) Oral at bedtime  trihexyphenidyl 2 milliGRAM(s) Oral three times a day      VITAL:  T(C): , Max: 36.8 (12-06-21 @ 21:19)  T(F): , Max: 98.3 (12-06-21 @ 21:19)  HR: 69 (12-07-21 @ 11:20)  BP: 136/81 (12-07-21 @ 11:20)  BP(mean): --  RR: 18 (12-07-21 @ 11:20)  SpO2: 98% (12-07-21 @ 11:20)  Wt(kg): --    I and O's:    12-06 @ 07:01  -  12-07 @ 07:00  --------------------------------------------------------  IN: 540 mL / OUT: 0 mL / NET: 540 mL    12-07 @ 07:01  -  12-07 @ 15:51  --------------------------------------------------------  IN: 500 mL / OUT: 0 mL / NET: 500 mL          PHYSICAL EXAM:    Constitutional: NAD  Neck:  No JVD  Respiratory: CTAB/L  Cardiovascular: S1 and S2  Gastrointestinal: BS+, soft, NT/ND  Extremities: No peripheral edema  Neurological: A/O x 3, no focal deficits  Psychiatric: Normal mood, normal affect  : No Javier  Skin: No rashes  Access: perm cath and avf     LABS:                        8.5    10.49 )-----------( 255      ( 06 Dec 2021 04:59 )             28.1     12-06    133<L>  |  94<L>  |  67<H>  ----------------------------<  114<H>  4.8   |  21<L>  |  6.03<H>    Ca    9.9      06 Dec 2021 04:59            Urine Studies:          RADIOLOGY & ADDITIONAL STUDIES:

## 2021-12-07 NOTE — PROGRESS NOTE ADULT - SUBJECTIVE AND OBJECTIVE BOX
Chief complaint  Patient is a 71y old  Male who presents with a chief complaint of leg swelling (06 Dec 2021 14:23)   Review of systems  Patient in bed, looks comfortable, no hypoglycemic episodes.    Labs and Fingersticks  CAPILLARY BLOOD GLUCOSE      POCT Blood Glucose.: 175 mg/dL (07 Dec 2021 11:43)  POCT Blood Glucose.: 190 mg/dL (07 Dec 2021 07:54)  POCT Blood Glucose.: 201 mg/dL (06 Dec 2021 21:41)  POCT Blood Glucose.: 163 mg/dL (06 Dec 2021 16:41)      Anion Gap, Serum: 18 *H* (12-06 @ 04:59)      Calcium, Total Serum: 9.9 (12-06 @ 04:59)          12-06    133<L>  |  94<L>  |  67<H>  ----------------------------<  114<H>  4.8   |  21<L>  |  6.03<H>    Ca    9.9      06 Dec 2021 04:59                          8.5    10.49 )-----------( 255      ( 06 Dec 2021 04:59 )             28.1     Medications  MEDICATIONS  (STANDING):  atorvastatin 80 milliGRAM(s) Oral at bedtime  carbidopa/levodopa  25/100 2.5 Tablet(s) Oral <User Schedule>  carbidopa/levodopa  25/100 2 Tablet(s) Oral <User Schedule>  chlorhexidine 4% Liquid 1 Application(s) Topical <User Schedule>  cholecalciferol 1000 Unit(s) Oral daily  dextrose 5%. 1000 milliLiter(s) (50 mL/Hr) IV Continuous <Continuous>  dextrose 5%. 1000 milliLiter(s) (100 mL/Hr) IV Continuous <Continuous>  dextrose 50% Injectable 25 Gram(s) IV Push once  dextrose 50% Injectable 12.5 Gram(s) IV Push once  dextrose 50% Injectable 25 Gram(s) IV Push once  diVALproex Sprinkle 250 milliGRAM(s) Oral two times a day  DULoxetine 20 milliGRAM(s) Oral daily  folic acid 1 milliGRAM(s) Oral daily  glucagon  Injectable 1 milliGRAM(s) IntraMuscular once  insulin glargine Injectable (LANTUS) 8 Unit(s) SubCutaneous at bedtime  insulin lispro (ADMELOG) corrective regimen sliding scale   SubCutaneous three times a day before meals  insulin lispro (ADMELOG) corrective regimen sliding scale   SubCutaneous at bedtime  levothyroxine 25 MICROGram(s) Oral daily  memantine 5 milliGRAM(s) Oral at bedtime  metoprolol tartrate 25 milliGRAM(s) Oral two times a day  midodrine 10 milliGRAM(s) Oral <User Schedule>  Nephro-celeste 1 Tablet(s) Oral daily  polyethylene glycol 3350 17 Gram(s) Oral two times a day  QUEtiapine 12.5 milliGRAM(s) Oral three times a day  senna Syrup 10 milliLiter(s) Oral at bedtime  trihexyphenidyl 2 milliGRAM(s) Oral three times a day      Physical Exam  General: Patient comfortable in bed  Vital Signs Last 12 Hrs  T(F): 97.4 (12-07-21 @ 11:20), Max: 98.1 (12-07-21 @ 04:30)  HR: 69 (12-07-21 @ 11:20) (69 - 117)  BP: 136/81 (12-07-21 @ 11:20) (111/73 - 136/81)  BP(mean): --  RR: 18 (12-07-21 @ 11:20) (14 - 18)  SpO2: 98% (12-07-21 @ 11:20) (96% - 99%)  Neck: No palpable thyroid nodules.  CVS: S1S2, No murmurs  Respiratory: No wheezing, no crepitations  GI: Abdomen soft, bowel sounds positive  Musculoskeletal:  edema lower extremities.   Skin: No skin rashes, no ecchymosis    Diagnostics             Chief complaint  Patient is a 71y old  Male who presents with a chief complaint of leg swelling (06 Dec 2021 14:23)   Review of systems  Patient in bed, looks comfortable, no hypoglycemic episodes.    Labs and Fingersticks  CAPILLARY BLOOD GLUCOSE      POCT Blood Glucose.: 175 mg/dL (07 Dec 2021 11:43)  POCT Blood Glucose.: 190 mg/dL (07 Dec 2021 07:54)  POCT Blood Glucose.: 201 mg/dL (06 Dec 2021 21:41)  POCT Blood Glucose.: 163 mg/dL (06 Dec 2021 16:41)      Anion Gap, Serum: 18 *H* (12-06 @ 04:59)      Calcium, Total Serum: 9.9 (12-06 @ 04:59)          12-06    133<L>  |  94<L>  |  67<H>  ----------------------------<  114<H>  4.8   |  21<L>  |  6.03<H>    Ca    9.9      06 Dec 2021 04:59                          8.5    10.49 )-----------( 255      ( 06 Dec 2021 04:59 )             28.1     Medications  MEDICATIONS  (STANDING):  atorvastatin 80 milliGRAM(s) Oral at bedtime  carbidopa/levodopa  25/100 2.5 Tablet(s) Oral <User Schedule>  carbidopa/levodopa  25/100 2 Tablet(s) Oral <User Schedule>  chlorhexidine 4% Liquid 1 Application(s) Topical <User Schedule>  cholecalciferol 1000 Unit(s) Oral daily  dextrose 5%. 1000 milliLiter(s) (50 mL/Hr) IV Continuous <Continuous>  dextrose 5%. 1000 milliLiter(s) (100 mL/Hr) IV Continuous <Continuous>  dextrose 50% Injectable 25 Gram(s) IV Push once  dextrose 50% Injectable 12.5 Gram(s) IV Push once  dextrose 50% Injectable 25 Gram(s) IV Push once  diVALproex Sprinkle 250 milliGRAM(s) Oral two times a day  DULoxetine 20 milliGRAM(s) Oral daily  folic acid 1 milliGRAM(s) Oral daily  glucagon  Injectable 1 milliGRAM(s) IntraMuscular once  insulin glargine Injectable (LANTUS) 8 Unit(s) SubCutaneous at bedtime  insulin lispro (ADMELOG) corrective regimen sliding scale   SubCutaneous three times a day before meals  insulin lispro (ADMELOG) corrective regimen sliding scale   SubCutaneous at bedtime  levothyroxine 25 MICROGram(s) Oral daily  memantine 5 milliGRAM(s) Oral at bedtime  metoprolol tartrate 25 milliGRAM(s) Oral two times a day  midodrine 10 milliGRAM(s) Oral <User Schedule>  Nephro-celeste 1 Tablet(s) Oral daily  polyethylene glycol 3350 17 Gram(s) Oral two times a day  QUEtiapine 12.5 milliGRAM(s) Oral three times a day  senna Syrup 10 milliLiter(s) Oral at bedtime  trihexyphenidyl 2 milliGRAM(s) Oral three times a day      Physical Exam  General: Patient comfortable in bed  Vital Signs Last 12 Hrs  T(F): 97.4 (12-07-21 @ 11:20), Max: 98.1 (12-07-21 @ 04:30)  HR: 69 (12-07-21 @ 11:20) (69 - 117)  BP: 136/81 (12-07-21 @ 11:20) (111/73 - 136/81)  BP(mean): --  RR: 18 (12-07-21 @ 11:20) (14 - 18)  SpO2: 98% (12-07-21 @ 11:20) (96% - 99%)  Neck: No palpable thyroid nodules.  CVS: S1S2, No murmurs  Respiratory: No wheezing, no crepitations  GI: Abdomen soft, bowel sounds positive  Musculoskeletal:  edema lower extremities.   Skin: No skin rashes, no ecchymosis    Diagnostics

## 2021-12-07 NOTE — PROGRESS NOTE ADULT - ASSESSMENT
70yo M w/ PMHx of CAD (s/p CABG 2019), CKD (unknown stage), DM2, Parkinson's Disease, HTN, depression presents with new onset acute heart failure exacerbation, NSTEMI, started on hep gtt, developed bl RP bleed, acute anemia. sp MICU course for hemorrhagic shock, 10/28 sp IR embolization l4 and l5 lumbar artery. for permacath and AVF.    # Acute systolic and diastolic heart failure, improved  # NSTEMI - conservative mgmt dt renal function and anemia-bld loss  # ESRD, new HD  # Atrial flutter  # acute hypoxic resp failure, improved  # hemorrhagic shock, left RP hematoma, acute blood loss anemia  # lupus anticoagulant +  Echo TTE mod to severe LV SD, hypokinesis anterior wall, not candidate for cath, medically manage  10/28 sp IR embolization l4 and l5 lumbar artery, h/h stable  HD via permacath per renal, on midodrine during dialysis  sp L AVF - vascular removed staples but a couple remain, await maturation    # Parkinsons disease   # delirium  currently cannot discharge 2/2 behavior and agitation, risk of pulling out permacath   corrected, resumed on seroquel, cont on depakote  c/w trihexyphenidyl, carbidopa/levodopa   per neuro, probable early lewy body dementia related paranoia and agitation    # DM2 (diabetes mellitus, type 2)  per endo  hba1c 6.4    # CAD (coronary artery disease)  # HTN  cont lopressor  c/w atorvastatin  asa on hold  Midodrine pre-dialysis     PCP Dr. Simons    DNR/DNI    dispo: AMS improved on Seroquel however, there still remains the concern that patient would be agitated and pulls out permacath at outside facility, will need palliative to fu re goals of care     d/w son -(hcp) and dgtrin law at length again11/6 - would like to continue HD, wish adequate behavior control w meds in order to prevent pulling put permacath  psych reconsult re: leo mgmt,   d/w nurse otf- will dc mittens today and observe as his behavior seems to be under control    Dr Rogers will be covering  starting 12/08/21  please call [facility] @ [phone] for questions or concerns

## 2021-12-07 NOTE — PROGRESS NOTE ADULT - ASSESSMENT
ASSESSMENT/PLAN:  72yo M w/ PMHx of CAD (s/p CABG 2019), CKD (unknown stage), DM2, Parkinson's Disease, HTN, depression presents with bilateral leg swelling  ESRD   Severe LV dysfunction; A flutter   Confusion -       1 Palliation - Intermittent confusion   2 Renal-  Next HD today but there was hypotension today and the session was terminated early   3 CVS- BP controlled at present, on Midodrine (with hold parameters in place, SBP> 160),  Lopressor for heart rate control   4 Anemia -  Retacrit 10k units with  HD   5 Vasc - s/p  LUE AVF  and now all sutures were removed      Behavior is the rate limiting step preventing dc at present.  Palliative care f/u eval noted.     Sayed [facility]   [phone]

## 2021-12-07 NOTE — PROGRESS NOTE ADULT - SUBJECTIVE AND OBJECTIVE BOX
Patient is a 71y old  Male who presents with a chief complaint of leg swelling (07 Dec 2021 15:51)      INTERVAL HPI/OVERNIGHT EVENTS: noted  pt seen and examined this am   events noted  not agitated for 2 days      Vital Signs Last 24 Hrs  T(C): 36.4 (07 Dec 2021 16:57), Max: 36.8 (06 Dec 2021 21:19)  T(F): 97.5 (07 Dec 2021 16:57), Max: 98.3 (06 Dec 2021 21:19)  HR: 118 (07 Dec 2021 16:57) (69 - 118)  BP: 145/90 (07 Dec 2021 16:57) (111/73 - 145/90)  BP(mean): --  RR: 14 (07 Dec 2021 16:57) (14 - 18)  SpO2: 98% (07 Dec 2021 16:57) (96% - 100%)    acetaminophen     Tablet .. 650 milliGRAM(s) Oral every 6 hours PRN  albuterol/ipratropium for Nebulization 3 milliLiter(s) Nebulizer every 6 hours PRN  atorvastatin 80 milliGRAM(s) Oral at bedtime  carbidopa/levodopa  25/100 2.5 Tablet(s) Oral <User Schedule>  carbidopa/levodopa  25/100 2 Tablet(s) Oral <User Schedule>  chlorhexidine 4% Liquid 1 Application(s) Topical <User Schedule>  cholecalciferol 1000 Unit(s) Oral daily  dextrose 5%. 1000 milliLiter(s) IV Continuous <Continuous>  dextrose 5%. 1000 milliLiter(s) IV Continuous <Continuous>  dextrose 50% Injectable 25 Gram(s) IV Push once  dextrose 50% Injectable 12.5 Gram(s) IV Push once  dextrose 50% Injectable 25 Gram(s) IV Push once  diVALproex Sprinkle 125 milliGRAM(s) Oral every 8 hours PRN  diVALproex Sprinkle 250 milliGRAM(s) Oral two times a day  DULoxetine 20 milliGRAM(s) Oral daily  folic acid 1 milliGRAM(s) Oral daily  glucagon  Injectable 1 milliGRAM(s) IntraMuscular once  guaiFENesin Oral Liquid (Sugar-Free) 200 milliGRAM(s) Oral every 6 hours PRN  insulin glargine Injectable (LANTUS) 8 Unit(s) SubCutaneous at bedtime  insulin lispro (ADMELOG) corrective regimen sliding scale   SubCutaneous three times a day before meals  insulin lispro (ADMELOG) corrective regimen sliding scale   SubCutaneous at bedtime  levothyroxine 25 MICROGram(s) Oral daily  memantine 5 milliGRAM(s) Oral at bedtime  metoprolol tartrate 25 milliGRAM(s) Oral two times a day  midodrine 10 milliGRAM(s) Oral <User Schedule>  Nephro-celeste 1 Tablet(s) Oral daily  polyethylene glycol 3350 17 Gram(s) Oral two times a day  QUEtiapine 12.5 milliGRAM(s) Oral three times a day  QUEtiapine 12.5 milliGRAM(s) Oral every 6 hours PRN  senna Syrup 10 milliLiter(s) Oral at bedtime  sodium chloride 0.9% lock flush 10 milliLiter(s) IV Push every 1 hour PRN  trihexyphenidyl 2 milliGRAM(s) Oral three times a day      PHYSICAL EXAM:  GENERAL: NAD,   EYES: conjunctiva and sclera clear  ENMT: Moist mucous membranes  NECK: Supple, No JVD, Normal thyroid  CHEST/LUNG: non labored, cta b/l  HEART: Regular rate and rhythm; No murmurs, rubs, or gallops  ABDOMEN: Soft, Nontender, Nondistended; Bowel sounds present  EXTREMITIES:  2+ Peripheral Pulses, No clubbing, cyanosis, or edema  LYMPH: No lymphadenopathy noted  SKIN: No rashes or lesions    Consultant(s) Notes Reviewed:  [x ] YES  [ ] NO  Care Discussed with Consultants/Other Providers [ x] YES  [ ] NO    LABS:                        8.5    10.49 )-----------( 255      ( 06 Dec 2021 04:59 )             28.1     12-06    133<L>  |  94<L>  |  67<H>  ----------------------------<  114<H>  4.8   |  21<L>  |  6.03<H>    Ca    9.9      06 Dec 2021 04:59          CAPILLARY BLOOD GLUCOSE      POCT Blood Glucose.: 188 mg/dL (07 Dec 2021 17:12)  POCT Blood Glucose.: 175 mg/dL (07 Dec 2021 11:43)  POCT Blood Glucose.: 190 mg/dL (07 Dec 2021 07:54)  POCT Blood Glucose.: 201 mg/dL (06 Dec 2021 21:41)              RADIOLOGY & ADDITIONAL TESTS:    Imaging Personally Reviewed:  [x ] YES  [ ] NO

## 2021-12-08 NOTE — PROGRESS NOTE ADULT - SUBJECTIVE AND OBJECTIVE BOX
[facility] Cardiology Progress Note  _______________________________    Pt. seen and examined. No new cardiac-related complaints.    T(C): 37.1 (12-08-21 @ 05:10), Max: 37.5 (12-07-21 @ 20:48)  HR: 115 (12-08-21 @ 05:10) (69 - 118)  BP: 139/83 (12-08-21 @ 05:10) (111/73 - 145/90)  RR: 18 (12-08-21 @ 05:10) (14 - 18)  SpO2: 97% (12-08-21 @ 05:10) (96% - 98%)  I&O's Summary    07 Dec 2021 07:01  -  08 Dec 2021 07:00  --------------------------------------------------------  IN: 740 mL / OUT: 0 mL / NET: 740 mL    08 Dec 2021 07:01  -  08 Dec 2021 09:19  --------------------------------------------------------  IN: 240 mL / OUT: 0 mL / NET: 240 mL        PHYSICAL EXAM:  GENERAL: Alert, NAD.  NECK: Supple.  CHEST/LUNG: Clear to auscultation bilaterally; No wheezes, rales, or rhonchi.  HEART: S1 S2 normal, RRR; No murmurs, rubs, or gallops  ABDOMEN: Soft, Nondistended  EXTREMITIES:  No LE edema.      LABS:                        8.6    8.11  )-----------( 239      ( 08 Dec 2021 05:42 )             28.0     12-08    131<L>  |  93<L>  |  85<H>  ----------------------------<  119<H>  4.9   |  21<L>  |  6.78<H>    Ca    9.6      08 Dec 2021 05:42                    MEDICATIONS  (STANDING):  atorvastatin 80 milliGRAM(s) Oral at bedtime  carbidopa/levodopa  25/100 2.5 Tablet(s) Oral <User Schedule>  carbidopa/levodopa  25/100 2 Tablet(s) Oral <User Schedule>  chlorhexidine 4% Liquid 1 Application(s) Topical <User Schedule>  cholecalciferol 1000 Unit(s) Oral daily  dextrose 5%. 1000 milliLiter(s) (50 mL/Hr) IV Continuous <Continuous>  dextrose 5%. 1000 milliLiter(s) (100 mL/Hr) IV Continuous <Continuous>  dextrose 50% Injectable 25 Gram(s) IV Push once  dextrose 50% Injectable 12.5 Gram(s) IV Push once  dextrose 50% Injectable 25 Gram(s) IV Push once  diVALproex Sprinkle 250 milliGRAM(s) Oral two times a day  DULoxetine 20 milliGRAM(s) Oral daily  folic acid 1 milliGRAM(s) Oral daily  glucagon  Injectable 1 milliGRAM(s) IntraMuscular once  insulin glargine Injectable (LANTUS) 8 Unit(s) SubCutaneous at bedtime  insulin lispro (ADMELOG) corrective regimen sliding scale   SubCutaneous three times a day before meals  insulin lispro (ADMELOG) corrective regimen sliding scale   SubCutaneous at bedtime  levothyroxine 25 MICROGram(s) Oral daily  memantine 5 milliGRAM(s) Oral at bedtime  metoprolol tartrate 25 milliGRAM(s) Oral two times a day  midodrine 10 milliGRAM(s) Oral <User Schedule>  Nephro-celeste 1 Tablet(s) Oral daily  polyethylene glycol 3350 17 Gram(s) Oral two times a day  QUEtiapine 12.5 milliGRAM(s) Oral three times a day  senna Syrup 10 milliLiter(s) Oral at bedtime  trihexyphenidyl 2 milliGRAM(s) Oral three times a day    MEDICATIONS  (PRN):  acetaminophen     Tablet .. 650 milliGRAM(s) Oral every 6 hours PRN Mild Pain (1 - 3)  albuterol/ipratropium for Nebulization 3 milliLiter(s) Nebulizer every 6 hours PRN Shortness of Breath and/or Wheezing  diVALproex Sprinkle 125 milliGRAM(s) Oral every 8 hours PRN Agitation  guaiFENesin Oral Liquid (Sugar-Free) 200 milliGRAM(s) Oral every 6 hours PRN Cough  QUEtiapine 12.5 milliGRAM(s) Oral every 6 hours PRN agitation  sodium chloride 0.9% lock flush 10 milliLiter(s) IV Push every 1 hour PRN Pre/post blood products, medications, blood draw, and to maintain line patency        RADIOLOGY & ADDITIONAL TESTS:

## 2021-12-08 NOTE — PROGRESS NOTE ADULT - PROBLEM SELECTOR PLAN 2
Will increase to synthroid 50 mcg po daily. Will continue monitoring TFTs and FU.  Suggest to FU outpatient in 4-6 weeks to repeat TFTs.  Discussed plan with patient.

## 2021-12-08 NOTE — PROGRESS NOTE ADULT - ASSESSMENT
ASSESSMENT/PLAN:  72yo M w/ PMHx of CAD (s/p CABG 2019), CKD (unknown stage), DM2, Parkinson's Disease, HTN, depression presents with bilateral leg swelling  ESRD   Severe LV dysfunction; A flutter   Confusion -       1 Palliation - Intermittent confusion   2 Renal-  Next HD in am    3 CVS- BP controlled at present, on Midodrine (with hold parameters in place, SBP> 160),  Lopressor for heart rate control   4 Anemia -  Retacrit 10k units with  HD   5 Vasc - s/p  LUE AVF  and now all sutures were removed        Palliative care f/u eval noted.   Family wants to cont HD     Sayed [facility]   [phone]

## 2021-12-08 NOTE — PROGRESS NOTE ADULT - SUBJECTIVE AND OBJECTIVE BOX
[facility]PHROLOGY[facility] [phone]        Patient seen and examined in bed.  He was about the same         MEDICATIONS  (STANDING):  atorvastatin 80 milliGRAM(s) Oral at bedtime  carbidopa/levodopa  25/100 2.5 Tablet(s) Oral <User Schedule>  carbidopa/levodopa  25/100 2 Tablet(s) Oral <User Schedule>  chlorhexidine 4% Liquid 1 Application(s) Topical <User Schedule>  cholecalciferol 1000 Unit(s) Oral daily  dextrose 5%. 1000 milliLiter(s) (50 mL/Hr) IV Continuous <Continuous>  dextrose 5%. 1000 milliLiter(s) (100 mL/Hr) IV Continuous <Continuous>  dextrose 50% Injectable 25 Gram(s) IV Push once  dextrose 50% Injectable 12.5 Gram(s) IV Push once  dextrose 50% Injectable 25 Gram(s) IV Push once  diVALproex Sprinkle 250 milliGRAM(s) Oral two times a day  DULoxetine 20 milliGRAM(s) Oral daily  folic acid 1 milliGRAM(s) Oral daily  glucagon  Injectable 1 milliGRAM(s) IntraMuscular once  insulin glargine Injectable (LANTUS) 8 Unit(s) SubCutaneous at bedtime  insulin lispro (ADMELOG) corrective regimen sliding scale   SubCutaneous three times a day before meals  insulin lispro (ADMELOG) corrective regimen sliding scale   SubCutaneous at bedtime  levothyroxine 25 MICROGram(s) Oral daily  memantine 5 milliGRAM(s) Oral at bedtime  metoprolol tartrate 50 milliGRAM(s) Oral two times a day  midodrine 10 milliGRAM(s) Oral <User Schedule>  Nephro-celeste 1 Tablet(s) Oral daily  polyethylene glycol 3350 17 Gram(s) Oral two times a day  QUEtiapine 12.5 milliGRAM(s) Oral three times a day  senna Syrup 10 milliLiter(s) Oral at bedtime  trihexyphenidyl 2 milliGRAM(s) Oral three times a day      VITAL:  T(C): , Max: 37.5 (12-07-21 @ 20:48)  T(F): , Max: 99.5 (12-07-21 @ 20:48)  HR: 115 (12-08-21 @ 05:10)  BP: 139/83 (12-08-21 @ 05:10)  BP(mean): --  RR: 18 (12-08-21 @ 05:10)  SpO2: 97% (12-08-21 @ 05:10)  Wt(kg): --    I and O's:    12-07 @ 07:01  -  12-08 @ 07:00  --------------------------------------------------------  IN: 740 mL / OUT: 0 mL / NET: 740 mL    12-08 @ 07:01  -  12-08 @ 09:45  --------------------------------------------------------  IN: 240 mL / OUT: 0 mL / NET: 240 mL          PHYSICAL EXAM:    Constitutional: NAD  Neck:  No JVD  Respiratory: CTAB/L  Cardiovascular: S1 and S2  Gastrointestinal: BS+, soft, NT/ND  Extremities: No peripheral edema  Neurological: A/O x 3, no focal deficits  Psychiatric: Normal mood, normal affect  : No Javier  Skin: No rashes  Access: perm cath and avf     LABS:                        8.6    8.11  )-----------( 239      ( 08 Dec 2021 05:42 )             28.0     12-08    131<L>  |  93<L>  |  85<H>  ----------------------------<  119<H>  4.9   |  21<L>  |  6.78<H>    Ca    9.6      08 Dec 2021 05:42            Urine Studies:          RADIOLOGY & ADDITIONAL STUDIES:

## 2021-12-08 NOTE — PROGRESS NOTE ADULT - ASSESSMENT
Assessment  DMT2: 71y Male with DM T2 with hyperglycemia, A1C 6.4%, was on insulin at home, now on low-dose basal insulin and coverage, blood sugars are trending up/running high and not at target, no hypoglycemias, eating meals, appears comfortable in NAD.  Hypothyroidism: Patient has no history thyroid disease, was not on any thyroid supplements, subclinical with low-normal FT4, lethargic, started on synthroid 25 mcg po daily, repeat FT4 trending down..  CHF: on medications, stable, monitored.  HTN: Controlled,  on antihypertensive medications.  Parkinsons: on meds, monitored.  CKD: Monitor labs/BMP      Kelsie Reece MD  Cell: [phone]  Office: [phone]     Assessment  DMT2: 71y Male with DM T2 with hyperglycemia, A1C 6.4%, was on insulin at home, now on low-dose basal insulin and coverage, blood sugars are trending up/running high and not at target, no hypoglycemias, eating meals,  appears comfortable in NAD.  Hypothyroidism: Patient has no history thyroid disease, was not on any thyroid supplements, subclinical with low-normal FT4, lethargic, started on synthroid 25 mcg po daily, repeat FT4 trending down..  CHF: on medications, stable, monitored.  HTN: Controlled,  on antihypertensive medications.  Parkinsons: on meds, monitored.  CKD: Monitor labs/BMP      Kelsie Reece MD  Cell: [phone]  Office: [phone]

## 2021-12-08 NOTE — PROGRESS NOTE ADULT - PROBLEM SELECTOR PLAN 1
-  -not on telemetry. no need for now  -rates somewhat elevated.   -recommend increasing lopressor to 50 mg bid  -not a candidate for anticoagulation given comorbidities as noted earlier.

## 2021-12-08 NOTE — PROGRESS NOTE ADULT - PROBLEM SELECTOR PLAN 1
Will continue Lantus 8u at bedtime.  Will start Admelog 5u before meals and continue coverage scale ac/hs. Will continue monitoring FS and FU.  Patient previously on large-dose basal bolus insulin. Suggest DC on current inpatient insulin regimen, endo FU 4 weeks.   for compliance with consistent low carb diet.

## 2021-12-08 NOTE — PROGRESS NOTE ADULT - SUBJECTIVE AND OBJECTIVE BOX
Chief complaint  Patient is a 71y old  Male who presents with a chief complaint of leg swelling (08 Dec 2021 10:24)   Review of systems  Patient in bed, looks comfortable, no hypoglycemic episodes.    Labs and Fingersticks  CAPILLARY BLOOD GLUCOSE      POCT Blood Glucose.: 275 mg/dL (08 Dec 2021 11:41)  POCT Blood Glucose.: 200 mg/dL (08 Dec 2021 07:51)  POCT Blood Glucose.: 252 mg/dL (07 Dec 2021 22:42)  POCT Blood Glucose.: 232 mg/dL (07 Dec 2021 21:32)  POCT Blood Glucose.: 188 mg/dL (07 Dec 2021 17:12)      Anion Gap, Serum: 17 (12-08 @ 05:42)      Calcium, Total Serum: 9.6 (12-08 @ 05:42)          12-08    131<L>  |  93<L>  |  85<H>  ----------------------------<  119<H>  4.9   |  21<L>  |  6.78<H>    Ca    9.6      08 Dec 2021 05:42                          8.6    8.11  )-----------( 239      ( 08 Dec 2021 05:42 )             28.0     Medications  MEDICATIONS  (STANDING):  atorvastatin 80 milliGRAM(s) Oral at bedtime  carbidopa/levodopa  25/100 2.5 Tablet(s) Oral <User Schedule>  carbidopa/levodopa  25/100 2 Tablet(s) Oral <User Schedule>  chlorhexidine 4% Liquid 1 Application(s) Topical <User Schedule>  cholecalciferol 1000 Unit(s) Oral daily  dextrose 40% Gel 15 Gram(s) Oral once  dextrose 5%. 1000 milliLiter(s) (50 mL/Hr) IV Continuous <Continuous>  dextrose 5%. 1000 milliLiter(s) (100 mL/Hr) IV Continuous <Continuous>  dextrose 50% Injectable 25 Gram(s) IV Push once  dextrose 50% Injectable 12.5 Gram(s) IV Push once  dextrose 50% Injectable 25 Gram(s) IV Push once  diVALproex Sprinkle 250 milliGRAM(s) Oral two times a day  DULoxetine 20 milliGRAM(s) Oral daily  folic acid 1 milliGRAM(s) Oral daily  glucagon  Injectable 1 milliGRAM(s) IntraMuscular once  insulin glargine Injectable (LANTUS) 8 Unit(s) SubCutaneous at bedtime  insulin lispro (ADMELOG) corrective regimen sliding scale   SubCutaneous three times a day before meals  insulin lispro (ADMELOG) corrective regimen sliding scale   SubCutaneous at bedtime  insulin lispro Injectable (ADMELOG) 5 Unit(s) SubCutaneous three times a day before meals  levothyroxine 25 MICROGram(s) Oral daily  memantine 5 milliGRAM(s) Oral at bedtime  metoprolol tartrate 50 milliGRAM(s) Oral two times a day  midodrine 10 milliGRAM(s) Oral <User Schedule>  Nephro-celeste 1 Tablet(s) Oral daily  polyethylene glycol 3350 17 Gram(s) Oral two times a day  QUEtiapine 12.5 milliGRAM(s) Oral three times a day  senna Syrup 10 milliLiter(s) Oral at bedtime  trihexyphenidyl 2 milliGRAM(s) Oral three times a day      Physical Exam  General: Patient comfortable in bed  Vital Signs Last 12 Hrs  T(F): 98 (12-08-21 @ 11:43), Max: 98.8 (12-08-21 @ 05:10)  HR: 99 (12-08-21 @ 11:43) (99 - 115)  BP: 127/66 (12-08-21 @ 11:43) (127/66 - 139/83)  BP(mean): --  RR: 18 (12-08-21 @ 11:43) (18 - 18)  SpO2: 98% (12-08-21 @ 11:43) (97% - 98%)  Neck: No palpable thyroid nodules.  CVS: S1S2, No murmurs  Respiratory: No wheezing, no crepitations  GI: Abdomen soft, bowel sounds positive  Musculoskeletal:  edema lower extremities.   Skin: No skin rashes, no ecchymosis    Diagnostics    Free Thyroxine, Serum: AM Sched. Collection: 08-Dec-2021 06:00 (12-07 @ 14:26)           Chief complaint  Patient is a 71y old  Male who presents with a chief complaint of leg swelling (08 Dec 2021 10:24)   Review of systems  Patient in bed, looks comfortable, no hypoglycemic episodes.    Labs and Fingersticks  CAPILLARY BLOOD GLUCOSE      POCT Blood Glucose.: 275 mg/dL (08 Dec 2021 11:41)  POCT Blood Glucose.: 200 mg/dL (08 Dec 2021 07:51)  POCT Blood Glucose.: 252 mg/dL (07 Dec 2021 22:42)  POCT Blood Glucose.: 232 mg/dL (07 Dec 2021 21:32)  POCT Blood Glucose.: 188 mg/dL (07 Dec 2021 17:12)      Anion Gap, Serum: 17 (12-08 @ 05:42)      Calcium, Total Serum: 9.6 (12-08 @ 05:42)          12-08    131<L>  |  93<L>  |  85<H>  ----------------------------<  119<H>  4.9   |  21<L>  |  6.78<H>    Ca    9.6      08 Dec 2021 05:42                          8.6    8.11  )-----------( 239      ( 08 Dec 2021 05:42 )             28.0     Medications  MEDICATIONS  (STANDING):  atorvastatin 80 milliGRAM(s) Oral at bedtime  carbidopa/levodopa  25/100 2.5 Tablet(s) Oral <User Schedule>  carbidopa/levodopa  25/100 2 Tablet(s) Oral <User Schedule>  chlorhexidine 4% Liquid 1 Application(s) Topical <User Schedule>  cholecalciferol 1000 Unit(s) Oral daily  dextrose 40% Gel 15 Gram(s) Oral once  dextrose 5%. 1000 milliLiter(s) (50 mL/Hr) IV Continuous <Continuous>  dextrose 5%. 1000 milliLiter(s) (100 mL/Hr) IV Continuous <Continuous>  dextrose 50% Injectable 25 Gram(s) IV Push once  dextrose 50% Injectable 12.5 Gram(s) IV Push once  dextrose 50% Injectable 25 Gram(s) IV Push once  diVALproex Sprinkle 250 milliGRAM(s) Oral two times a day  DULoxetine 20 milliGRAM(s) Oral daily  folic acid 1 milliGRAM(s) Oral daily  glucagon  Injectable 1 milliGRAM(s) IntraMuscular once  insulin glargine Injectable (LANTUS) 8 Unit(s) SubCutaneous at bedtime  insulin lispro (ADMELOG) corrective regimen sliding scale   SubCutaneous three times a day before meals  insulin lispro (ADMELOG) corrective regimen sliding scale   SubCutaneous at bedtime  insulin lispro Injectable (ADMELOG) 5 Unit(s) SubCutaneous three times a day before meals  levothyroxine 25 MICROGram(s) Oral daily  memantine 5 milliGRAM(s) Oral at bedtime  metoprolol tartrate 50 milliGRAM(s) Oral two times a day  midodrine 10 milliGRAM(s) Oral <User Schedule>  Nephro-celeste 1 Tablet(s) Oral daily  polyethylene glycol 3350 17 Gram(s) Oral two times a day  QUEtiapine 12.5 milliGRAM(s) Oral three times a day  senna Syrup 10 milliLiter(s) Oral at bedtime  trihexyphenidyl 2 milliGRAM(s) Oral three times a day      Physical Exam  General: Patient comfortable in bed  Vital Signs Last 12 Hrs  T(F): 98 (12-08-21 @ 11:43), Max: 98.8 (12-08-21 @ 05:10)  HR: 99 (12-08-21 @ 11:43) (99 - 115)  BP: 127/66 (12-08-21 @ 11:43) (127/66 - 139/83)  BP(mean): --  RR: 18 (12-08-21 @ 11:43) (18 - 18)  SpO2: 98% (12-08-21 @ 11:43) (97% - 98%)  Neck: No palpable thyroid nodules.  CVS: S1S2, No murmurs  Respiratory: No wheezing, no crepitations  GI: Abdomen soft, bowel sounds positive  Musculoskeletal:  edema lower extremities.   Skin: No skin rashes, no ecchymosis    Diagnostics    Free Thyroxine, Serum: AM Sched. Collection: 08-Dec-2021 06:00 (12-07 @ 14:26)

## 2021-12-08 NOTE — PROGRESS NOTE ADULT - PROBLEM SELECTOR PLAN 3
-  -nephrology following    patient follows with Dr. vega ([facility])    Go Bobby D.O.  [phone]

## 2021-12-08 NOTE — PROGRESS NOTE ADULT - ASSESSMENT
72yo M w/ PMHx of CAD (s/p CABG 2019), CKD (unknown stage), DM2, Parkinson's Disease, HTN, depression presents with new onset acute heart failure exacerbation, NSTEMI, started on hep gtt, developed bl RP bleed, acute anemia. sp MICU course for hemorrhagic shock, 10/28 sp IR embolization l4 and l5 lumbar artery. for permacath and AVF.    # Acute systolic and diastolic heart failure, improved  # NSTEMI - conservative mgmt dt renal function and anemia-bld loss  # ESRD, new HD  # Atrial flutter  # acute hypoxic resp failure, improved  # hemorrhagic shock, left RP hematoma, acute blood loss anemia  # lupus anticoagulant +  Echo TTE mod to severe LV SD, hypokinesis anterior wall, not candidate for cath, medically manage  10/28 sp IR embolization l4 and l5 lumbar artery, h/h stable  HD via permacath per renal, on midodrine during dialysis  sp L AVF - vascular removed staples but a couple remain, await maturation    # Parkinsons disease   # delirium  currently cannot discharge 2/2 behavior and agitation, risk of pulling out permacath   corrected, resumed on seroquel, cont on depakote  c/w trihexyphenidyl, carbidopa/levodopa   per neuro, probable early lewy body dementia related paranoia and agitation    # DM2 (diabetes mellitus, type 2)  per endo  hba1c 6.4    # CAD (coronary artery disease)  # HTN  cont lopressor  c/w atorvastatin  asa on hold  Midodrine pre-dialysis     PCP Dr. Simons    DNR/DNI    dispo: AMS improved on Seroquel however, there still remains the concern that patient would be agitated and pulls out permacath at outside facility, will need palliative to fu re goals of care     d/w son -(hcp) and dgtrin law at length again11/6 - would like to continue HD, wish adequate behavior control w meds in order to prevent pulling put permacath  psych reconsult re: leo mgmt,   d/w nurse otf- will dc mittens today and observe as his behavior seems to be under control       72yo M w/ PMHx of CAD (s/p CABG 2019), CKD (unknown stage), DM2, Parkinson's Disease, HTN, depression presents with new onset acute heart failure exacerbation, NSTEMI, started on hep gtt, developed bl RP bleed, acute anemia. sp MICU course for hemorrhagic shock, 10/28 sp IR embolization l4 and l5 lumbar artery. for permacath and AVF.    # Acute systolic and diastolic heart failure  # NSTEMI  # ESRD, new HD  # Atrial flutter  # acute hypoxic resp failure  # hemorrhagic shock, left RP hematoma, acute blood loss anemia  # lupus anticoagulant +  Echo TTE mod to severe LV SD, hypokinesis anterior wall, not candidate for cath, medically manage  10/28 sp IR embolization l4 and l5 lumbar artery, h/h stable  HD via permacath per renal, on midodrine during dialysis  sp L AVF - vascular removed staples    # Parkinsons disease   # delirium  currently cannot discharge 2/2 behavior and agitation  cont on depakote seroquel and cymbalta  c/w trihexyphenidyl, carbidopa/levodopa   per neuro, probable early lewy body dementia related paranoia and agitation    # DM2 (diabetes mellitus, type 2)  per endo  hba1c 6.4    # CAD (coronary artery disease)  # HTN  sinus tach, increase lopressor 50mg bid, appreciate cardio recs  c/w atorvastatin  asa on hold    PCP Dr. Simons    DNR/DNI  appreciate palliative fu, family not ready to stop dialysis

## 2021-12-08 NOTE — PROGRESS NOTE ADULT - PROBLEM SELECTOR PLAN 2
-  -medical management  -not a candidate for invasive evaluation at this time  -continue current regimen  -no complaints at this time

## 2021-12-08 NOTE — PROGRESS NOTE ADULT - SUBJECTIVE AND OBJECTIVE BOX
SUBJECTIVE / OVERNIGHT EVENTS:    INCOMPLETE NOTE      --------------------------------------------------------------------------------------------  LABS:                        8.6    8.11  )-----------( 239      ( 08 Dec 2021 05:42 )             28.0     12-08    131<L>  |  93<L>  |  85<H>  ----------------------------<  119<H>  4.9   |  21<L>  |  6.78<H>    Ca    9.6      08 Dec 2021 05:42        CAPILLARY BLOOD GLUCOSE      POCT Blood Glucose.: 200 mg/dL (08 Dec 2021 07:51)  POCT Blood Glucose.: 252 mg/dL (07 Dec 2021 22:42)  POCT Blood Glucose.: 232 mg/dL (07 Dec 2021 21:32)  POCT Blood Glucose.: 188 mg/dL (07 Dec 2021 17:12)  POCT Blood Glucose.: 175 mg/dL (07 Dec 2021 11:43)            RADIOLOGY & ADDITIONAL TESTS:    Imaging Personally Reviewed:  [x] YES  [ ] NO    Consultant(s) Notes Reviewed:  [x] YES  [ ] NO    MEDICATIONS  (STANDING):  atorvastatin 80 milliGRAM(s) Oral at bedtime  carbidopa/levodopa  25/100 2.5 Tablet(s) Oral <User Schedule>  carbidopa/levodopa  25/100 2 Tablet(s) Oral <User Schedule>  chlorhexidine 4% Liquid 1 Application(s) Topical <User Schedule>  cholecalciferol 1000 Unit(s) Oral daily  dextrose 5%. 1000 milliLiter(s) (50 mL/Hr) IV Continuous <Continuous>  dextrose 5%. 1000 milliLiter(s) (100 mL/Hr) IV Continuous <Continuous>  dextrose 50% Injectable 25 Gram(s) IV Push once  dextrose 50% Injectable 12.5 Gram(s) IV Push once  dextrose 50% Injectable 25 Gram(s) IV Push once  diVALproex Sprinkle 250 milliGRAM(s) Oral two times a day  DULoxetine 20 milliGRAM(s) Oral daily  folic acid 1 milliGRAM(s) Oral daily  glucagon  Injectable 1 milliGRAM(s) IntraMuscular once  insulin glargine Injectable (LANTUS) 8 Unit(s) SubCutaneous at bedtime  insulin lispro (ADMELOG) corrective regimen sliding scale   SubCutaneous three times a day before meals  insulin lispro (ADMELOG) corrective regimen sliding scale   SubCutaneous at bedtime  levothyroxine 25 MICROGram(s) Oral daily  memantine 5 milliGRAM(s) Oral at bedtime  metoprolol tartrate 50 milliGRAM(s) Oral two times a day  midodrine 10 milliGRAM(s) Oral <User Schedule>  Nephro-celeste 1 Tablet(s) Oral daily  polyethylene glycol 3350 17 Gram(s) Oral two times a day  QUEtiapine 12.5 milliGRAM(s) Oral three times a day  senna Syrup 10 milliLiter(s) Oral at bedtime  trihexyphenidyl 2 milliGRAM(s) Oral three times a day    MEDICATIONS  (PRN):  acetaminophen     Tablet .. 650 milliGRAM(s) Oral every 6 hours PRN Mild Pain (1 - 3)  albuterol/ipratropium for Nebulization 3 milliLiter(s) Nebulizer every 6 hours PRN Shortness of Breath and/or Wheezing  diVALproex Sprinkle 125 milliGRAM(s) Oral every 8 hours PRN Agitation  guaiFENesin Oral Liquid (Sugar-Free) 200 milliGRAM(s) Oral every 6 hours PRN Cough  QUEtiapine 12.5 milliGRAM(s) Oral every 6 hours PRN agitation  sodium chloride 0.9% lock flush 10 milliLiter(s) IV Push every 1 hour PRN Pre/post blood products, medications, blood draw, and to maintain line patency      Care Discussed with Consultants/Other Providers [x] YES  [ ] NO    Vital Signs Last 24 Hrs  T(C): 37.1 (08 Dec 2021 05:10), Max: 37.5 (07 Dec 2021 20:48)  T(F): 98.8 (08 Dec 2021 05:10), Max: 99.5 (07 Dec 2021 20:48)  HR: 115 (08 Dec 2021 05:10) (69 - 118)  BP: 139/83 (08 Dec 2021 05:10) (135/82 - 145/90)  BP(mean): --  RR: 18 (08 Dec 2021 05:10) (14 - 18)  SpO2: 97% (08 Dec 2021 05:10) (96% - 98%)  I&O's Summary    07 Dec 2021 07:01  -  08 Dec 2021 07:00  --------------------------------------------------------  IN: 740 mL / OUT: 0 mL / NET: 740 mL    08 Dec 2021 07:01  -  08 Dec 2021 10:25  --------------------------------------------------------  IN: 240 mL / OUT: 0 mL / NET: 240 mL           SUBJECTIVE / OVERNIGHT EVENTS:    calm today. states his eyes are bothering him. allows me to examine him.       --------------------------------------------------------------------------------------------  LABS:                        8.6    8.11  )-----------( 239      ( 08 Dec 2021 05:42 )             28.0     12-08    131<L>  |  93<L>  |  85<H>  ----------------------------<  119<H>  4.9   |  21<L>  |  6.78<H>    Ca    9.6      08 Dec 2021 05:42        CAPILLARY BLOOD GLUCOSE      POCT Blood Glucose.: 200 mg/dL (08 Dec 2021 07:51)  POCT Blood Glucose.: 252 mg/dL (07 Dec 2021 22:42)  POCT Blood Glucose.: 232 mg/dL (07 Dec 2021 21:32)  POCT Blood Glucose.: 188 mg/dL (07 Dec 2021 17:12)  POCT Blood Glucose.: 175 mg/dL (07 Dec 2021 11:43)            RADIOLOGY & ADDITIONAL TESTS:    Imaging Personally Reviewed:  [x] YES  [ ] NO    Consultant(s) Notes Reviewed:  [x] YES  [ ] NO    MEDICATIONS  (STANDING):  atorvastatin 80 milliGRAM(s) Oral at bedtime  carbidopa/levodopa  25/100 2.5 Tablet(s) Oral <User Schedule>  carbidopa/levodopa  25/100 2 Tablet(s) Oral <User Schedule>  chlorhexidine 4% Liquid 1 Application(s) Topical <User Schedule>  cholecalciferol 1000 Unit(s) Oral daily  dextrose 5%. 1000 milliLiter(s) (50 mL/Hr) IV Continuous <Continuous>  dextrose 5%. 1000 milliLiter(s) (100 mL/Hr) IV Continuous <Continuous>  dextrose 50% Injectable 25 Gram(s) IV Push once  dextrose 50% Injectable 12.5 Gram(s) IV Push once  dextrose 50% Injectable 25 Gram(s) IV Push once  diVALproex Sprinkle 250 milliGRAM(s) Oral two times a day  DULoxetine 20 milliGRAM(s) Oral daily  folic acid 1 milliGRAM(s) Oral daily  glucagon  Injectable 1 milliGRAM(s) IntraMuscular once  insulin glargine Injectable (LANTUS) 8 Unit(s) SubCutaneous at bedtime  insulin lispro (ADMELOG) corrective regimen sliding scale   SubCutaneous three times a day before meals  insulin lispro (ADMELOG) corrective regimen sliding scale   SubCutaneous at bedtime  levothyroxine 25 MICROGram(s) Oral daily  memantine 5 milliGRAM(s) Oral at bedtime  metoprolol tartrate 50 milliGRAM(s) Oral two times a day  midodrine 10 milliGRAM(s) Oral <User Schedule>  Nephro-celeste 1 Tablet(s) Oral daily  polyethylene glycol 3350 17 Gram(s) Oral two times a day  QUEtiapine 12.5 milliGRAM(s) Oral three times a day  senna Syrup 10 milliLiter(s) Oral at bedtime  trihexyphenidyl 2 milliGRAM(s) Oral three times a day    MEDICATIONS  (PRN):  acetaminophen     Tablet .. 650 milliGRAM(s) Oral every 6 hours PRN Mild Pain (1 - 3)  albuterol/ipratropium for Nebulization 3 milliLiter(s) Nebulizer every 6 hours PRN Shortness of Breath and/or Wheezing  diVALproex Sprinkle 125 milliGRAM(s) Oral every 8 hours PRN Agitation  guaiFENesin Oral Liquid (Sugar-Free) 200 milliGRAM(s) Oral every 6 hours PRN Cough  QUEtiapine 12.5 milliGRAM(s) Oral every 6 hours PRN agitation  sodium chloride 0.9% lock flush 10 milliLiter(s) IV Push every 1 hour PRN Pre/post blood products, medications, blood draw, and to maintain line patency      Care Discussed with Consultants/Other Providers [x] YES  [ ] NO    Vital Signs Last 24 Hrs  T(C): 37.1 (08 Dec 2021 05:10), Max: 37.5 (07 Dec 2021 20:48)  T(F): 98.8 (08 Dec 2021 05:10), Max: 99.5 (07 Dec 2021 20:48)  HR: 115 (08 Dec 2021 05:10) (69 - 118)  BP: 139/83 (08 Dec 2021 05:10) (135/82 - 145/90)  BP(mean): --  RR: 18 (08 Dec 2021 05:10) (14 - 18)  SpO2: 97% (08 Dec 2021 05:10) (96% - 98%)  I&O's Summary    07 Dec 2021 07:01  -  08 Dec 2021 07:00  --------------------------------------------------------  IN: 740 mL / OUT: 0 mL / NET: 740 mL    08 Dec 2021 07:01  -  08 Dec 2021 10:25  --------------------------------------------------------  IN: 240 mL / OUT: 0 mL / NET: 240 mL    GENERAL: NAD, AAOx2  HEAD:  Atraumatic, Normocephalic  CHEST/LUNG: CTABL, No wheeze, right neck permacath  HEART: Regular rate and rhythm; No murmurs, rubs, or gallops  ABDOMEN: Soft, Nontender, Nondistended; Bowel sounds present  EXTREMITIES:  2+ Peripheral Pulses, No clubbing, cyanosis, or edema, left avf staples removed  SKIN: No rashes or lesions

## 2021-12-09 NOTE — PROGRESS NOTE ADULT - SUBJECTIVE AND OBJECTIVE BOX
[facility]      Seen earlier today, and examined.  - Today, patient is without complaints.           *****MEDICATIONS: Current medication reviewed and documented.    MEDICATIONS  (STANDING):  atorvastatin 80 milliGRAM(s) Oral at bedtime  carbidopa/levodopa  25/100 2.5 Tablet(s) Oral <User Schedule>  carbidopa/levodopa  25/100 2 Tablet(s) Oral <User Schedule>  chlorhexidine 4% Liquid 1 Application(s) Topical <User Schedule>  cholecalciferol 1000 Unit(s) Oral daily  dextrose 40% Gel 15 Gram(s) Oral once  dextrose 5%. 1000 milliLiter(s) (50 mL/Hr) IV Continuous <Continuous>  dextrose 5%. 1000 milliLiter(s) (100 mL/Hr) IV Continuous <Continuous>  dextrose 50% Injectable 25 Gram(s) IV Push once  dextrose 50% Injectable 12.5 Gram(s) IV Push once  dextrose 50% Injectable 25 Gram(s) IV Push once  diVALproex Sprinkle 250 milliGRAM(s) Oral two times a day  DULoxetine 20 milliGRAM(s) Oral daily  folic acid 1 milliGRAM(s) Oral daily  glucagon  Injectable 1 milliGRAM(s) IntraMuscular once  insulin glargine Injectable (LANTUS) 8 Unit(s) SubCutaneous at bedtime  insulin lispro (ADMELOG) corrective regimen sliding scale   SubCutaneous three times a day before meals  insulin lispro (ADMELOG) corrective regimen sliding scale   SubCutaneous at bedtime  insulin lispro Injectable (ADMELOG) 3 Unit(s) SubCutaneous three times a day before meals  levothyroxine 50 MICROGram(s) Oral daily  memantine 5 milliGRAM(s) Oral at bedtime  metoprolol tartrate 50 milliGRAM(s) Oral two times a day  midodrine 10 milliGRAM(s) Oral <User Schedule>  Nephro-celeste 1 Tablet(s) Oral daily  polyethylene glycol 3350 17 Gram(s) Oral two times a day  QUEtiapine 12.5 milliGRAM(s) Oral three times a day  senna Syrup 10 milliLiter(s) Oral at bedtime  trihexyphenidyl 2 milliGRAM(s) Oral three times a day    MEDICATIONS  (PRN):  acetaminophen     Tablet .. 650 milliGRAM(s) Oral every 6 hours PRN Mild Pain (1 - 3)  albuterol/ipratropium for Nebulization 3 milliLiter(s) Nebulizer every 6 hours PRN Shortness of Breath and/or Wheezing  diVALproex Sprinkle 125 milliGRAM(s) Oral every 8 hours PRN Agitation  guaiFENesin Oral Liquid (Sugar-Free) 200 milliGRAM(s) Oral every 6 hours PRN Cough  QUEtiapine 12.5 milliGRAM(s) Oral every 6 hours PRN agitation  sodium chloride 0.9% lock flush 10 milliLiter(s) IV Push every 1 hour PRN Pre/post blood products, medications, blood draw, and to maintain line patency          ***** VITAL SIGNS:  T(F): 98.2 (21 @ 16:40), Max: 98.2 (21 @ 16:40)  HR: 58 (21 @ 16:40) (58 - 99)  BP: 108/68 (21 @ 16:40) (108/68 - 123/84)  RR: 17 (21 @ 16:40) (17 - 18)  SpO2: 98% (21 @ 16:40) (96% - 98%)  Wt(kg): --  ,   I&O's Summary    08 Dec 2021 07:  -  09 Dec 2021 07:00  --------------------------------------------------------  IN: 720 mL / OUT: 0 mL / NET: 720 mL    09 Dec 2021 07:  -  09 Dec 2021 18:55  --------------------------------------------------------  IN: 220 mL / OUT: 100 mL / NET: 120 mL             *****PHYSICAL EXAM:   alert oriented x2 attention comprehension are fair.  Able to name, repeat.   EOmi fundi not visualized   no nystagmus VFF to confrontation  Tongue is midline  Palate elevates symmetrically   Moving all 4 ext spontaneously no drift appreciated    Gait not assessed.            *****LAB AND IMAGIN.1    8.89  )-----------( 238      ( 09 Dec 2021 05:36 )             27.1                   131<L>  |  92<L>  |  102<H>  ----------------------------<  217<H>  5.3   |  20<L>  |  7.37<H>    Ca    9.7      09 Dec 2021 05:33  Phos  6.9       Mg     2.2                                [All pertinent recent Imaging/Reports reviewed]           *****A S S E S S M E N T   A N D   P L A N :        70yo M w/ PMHx of CAD (s/p CABG ), CKD (unknown stage), DM2, Parkinson's Disease, HTN, depression presents with bilateral leg swelling, patient's daughter in-law at bedside Elsy Quintero ([address] [place] [address] Nurse) provides the history, the daughter reports the patient is a poor historian, unable to remember having conversations with others and likely cannot weigh risk/benefits of medical conditions, she reports that he has had bilateral lower extremity swelling for the past few months, but over the past 2 weeks it has significantly worsened, he has further difficulty ambulating due to swelling, his outpatient provider attempted to manage with 20mg lasix and then increased to 40mg lasix but the patient never took the increased dose, his symptoms ar persistent throughout the day and are extremely severe, he has developed wounds of the legs with weeping of fluid due to the swelling, she reports that the patient is frequently non-compliant with medications and medical management, he lives at home with his son and daughter in law, he denies chest pain, shortness of breath, fever/chills, cough, sputum, in the ED, he was tachycardic but hemodynamically stable, afebrile, saturating well on room air, labs were significant for elevated BNP, elevated creatinine, imaging showed moderate left pleural effusion, patient was admitted to general medicine for further management          Problem/Recommendations 1:ams of unclear etiology   likely related to multiple metabolic derangements related to uremia  sleep wake cycle disruption and poor nutritional intake  would regulate sleep wake cycle  check b12/b1 folate/tsh /ammonia wnl   thiamine 500 iv q 8 x 2 days after checking vitamin b1 level   nutrition consult for severe protein caloric malnutrition   discussed with elsy( daughter in law), agreeing to start namenda 5 mg qhs probable early lewy body dementia related paranoia and agitation   plan likely needs to adjust dose of antipsychotics, however avoid typical antipsychotics  elsy denies any alcohol intake at all.     lowered seroquel 12.5 due to sedation    over night pt did not sleep   now fell asleep as per aide at 7.15   regulate sleep wake cycle   dc am dose of seroquel   increase night time dose to regulate sleep wake cycle        more awake   conversant.   still somewhat agitated  does follow simple commands   pt able to arouse, converse    more awake, following commands   still with paranoia      on cymbalta       Problem/Recommendations 2: weakness   r/o orthostatic hypotension   pt shaking on standing up    prob deconditioning   pt eval   oob to chair as tolerated        Problem/Recommendations 3: parkinsons   continue current regimen   sinemet 2. 5 twice a day and 2 mg qh  Thank you for allowing me to participate in the care of this patient. Will continue to follow patient periodically. Please do not hesitate to call me if you have any  questions or if there has been a change in patients neurological status     ________________  Lily Fang MD  [facility] ([facility]  [phone]      33 minutes spent on total encounter; more than 50 % of the visit was  spent counseling about plan of care, compliance to diet/exercise and medication regimen and or  coordinating care by the attending physician.      It is advised that stroke patients follow up with ZACH Albarran @ [phone] in 1- 2 weeks.   Others please follow up with Dr. Michael Nissenbaum [phone] [facility]      Seen earlier today, and examined.  - Today, patient is without complaints.           *****MEDICATIONS: Current medication reviewed and documented.    MEDICATIONS  (STANDING):  atorvastatin 80 milliGRAM(s) Oral at bedtime  carbidopa/levodopa  25/100 2.5 Tablet(s) Oral <User Schedule>  carbidopa/levodopa  25/100 2 Tablet(s) Oral <User Schedule>  chlorhexidine 4% Liquid 1 Application(s) Topical <User Schedule>  cholecalciferol 1000 Unit(s) Oral daily  dextrose 40% Gel 15 Gram(s) Oral once  dextrose 5%. 1000 milliLiter(s) (50 mL/Hr) IV Continuous <Continuous>  dextrose 5%. 1000 milliLiter(s) (100 mL/Hr) IV Continuous <Continuous>  dextrose 50% Injectable 25 Gram(s) IV Push once  dextrose 50% Injectable 12.5 Gram(s) IV Push once  dextrose 50% Injectable 25 Gram(s) IV Push once  diVALproex Sprinkle 250 milliGRAM(s) Oral two times a day  DULoxetine 20 milliGRAM(s) Oral daily  folic acid 1 milliGRAM(s) Oral daily  glucagon  Injectable 1 milliGRAM(s) IntraMuscular once  insulin glargine Injectable (LANTUS) 8 Unit(s) SubCutaneous at bedtime  insulin lispro (ADMELOG) corrective regimen sliding scale   SubCutaneous three times a day before meals  insulin lispro (ADMELOG) corrective regimen sliding scale   SubCutaneous at bedtime  insulin lispro Injectable (ADMELOG) 3 Unit(s) SubCutaneous three times a day before meals  levothyroxine 50 MICROGram(s) Oral daily  memantine 5 milliGRAM(s) Oral at bedtime  metoprolol tartrate 50 milliGRAM(s) Oral two times a day  midodrine 10 milliGRAM(s) Oral <User Schedule>  Nephro-celeste 1 Tablet(s) Oral daily  polyethylene glycol 3350 17 Gram(s) Oral two times a day  QUEtiapine 12.5 milliGRAM(s) Oral three times a day  senna Syrup 10 milliLiter(s) Oral at bedtime  trihexyphenidyl 2 milliGRAM(s) Oral three times a day    MEDICATIONS  (PRN):  acetaminophen     Tablet .. 650 milliGRAM(s) Oral every 6 hours PRN Mild Pain (1 - 3)  albuterol/ipratropium for Nebulization 3 milliLiter(s) Nebulizer every 6 hours PRN Shortness of Breath and/or Wheezing  diVALproex Sprinkle 125 milliGRAM(s) Oral every 8 hours PRN Agitation  guaiFENesin Oral Liquid (Sugar-Free) 200 milliGRAM(s) Oral every 6 hours PRN Cough  QUEtiapine 12.5 milliGRAM(s) Oral every 6 hours PRN agitation  sodium chloride 0.9% lock flush 10 milliLiter(s) IV Push every 1 hour PRN Pre/post blood products, medications, blood draw, and to maintain line patency          ***** VITAL SIGNS:  T(F): 98.2 (21 @ 16:40), Max: 98.2 (21 @ 16:40)  HR: 58 (21 @ 16:40) (58 - 99)  BP: 108/68 (21 @ 16:40) (108/68 - 123/84)  RR: 17 (21 @ 16:40) (17 - 18)  SpO2: 98% (21 @ 16:40) (96% - 98%)  Wt(kg): --  ,   I&O's Summary    08 Dec 2021 07:  -  09 Dec 2021 07:00  --------------------------------------------------------  IN: 720 mL / OUT: 0 mL / NET: 720 mL    09 Dec 2021 07:  -  09 Dec 2021 18:55  --------------------------------------------------------  IN: 220 mL / OUT: 100 mL / NET: 120 mL             *****PHYSICAL EXAM:   sleeping, arouses to much coercion.      alert oriented x2 attention comprehension are poor.      EOmi fundi not visualized   no nystagmus VFF to confrontation  Tongue is midline  Palate elevates symmetrically   Moving all 4 ext spontaneously no drift appreciated    Gait not assessed.            *****LAB AND IMAGIN.1    8.89  )-----------( 238      ( 09 Dec 2021 05:36 )             27.1                   131<L>  |  92<L>  |  102<H>  ----------------------------<  217<H>  5.3   |  20<L>  |  7.37<H>    Ca    9.7      09 Dec 2021 05:33  Phos  6.9       Mg     2.2                                [All pertinent recent Imaging/Reports reviewed]           *****A S S E S S M E N T   A N D   P L A N :        70yo M w/ PMHx of CAD (s/p CABG ), CKD (unknown stage), DM2, Parkinson's Disease, HTN, depression presents with bilateral leg swelling, patient's daughter in-law at bedside Elsy Quintero ([address] [place] [address] Nurse) provides the history, the daughter reports the patient is a poor historian, unable to remember having conversations with others and likely cannot weigh risk/benefits of medical conditions, she reports that he has had bilateral lower extremity swelling for the past few months, but over the past 2 weeks it has significantly worsened, he has further difficulty ambulating due to swelling, his outpatient provider attempted to manage with 20mg lasix and then increased to 40mg lasix but the patient never took the increased dose, his symptoms ar persistent throughout the day and are extremely severe, he has developed wounds of the legs with weeping of fluid due to the swelling, she reports that the patient is frequently non-compliant with medications and medical management, he lives at home with his son and daughter in law, he denies chest pain, shortness of breath, fever/chills, cough, sputum, in the ED, he was tachycardic but hemodynamically stable, afebrile, saturating well on room air, labs were significant for elevated BNP, elevated creatinine, imaging showed moderate left pleural effusion, patient was admitted to general medicine for further management          Problem/Recommendations 1:ams of unclear etiology   likely related to multiple metabolic derangements related to uremia  sleep wake cycle disruption and poor nutritional intake  would regulate sleep wake cycle  check b12/b1 folate/tsh /ammonia wnl   thiamine 500 iv q 8 x 2 days after checking vitamin b1 level   nutrition consult for severe protein caloric malnutrition   discussed with elsy( daughter in law), agreeing to start namenda 5 mg qhs probable early lewy body dementia related paranoia and agitation   plan likely needs to adjust dose of antipsychotics, however avoid typical antipsychotics  elsy denies any alcohol intake at all.     lowered seroquel 12.5 due to sedation    over night pt did not sleep   now fell asleep as per aide at 7.15   regulate sleep wake cycle   dc am dose of seroquel   increase night time dose to regulate sleep wake cycle        more awake   conversant.   still somewhat agitated  does follow simple commands   pt able to arouse, converse    more awake, following commands   still with paranoia      on cymbalta       Problem/Recommendations 2: weakness   r/o orthostatic hypotension   pt shaking on standing up    prob deconditioning   pt eval   oob to chair as tolerated        Problem/Recommendations 3: parkinsons   continue current regimen   sinemet 2. 5 twice a day and 2 mg qh  Thank you for allowing me to participate in the care of this patient. Will continue to follow patient periodically. Please do not hesitate to call me if you have any  questions or if there has been a change in patients neurological status     ________________  Lily Fang MD  [facility] ([facility]  [phone]      33 minutes spent on total encounter; more than 50 % of the visit was  spent counseling about plan of care, compliance to diet/exercise and medication regimen and or  coordinating care by the attending physician.      It is advised that stroke patients follow up with ZACH Albarran @ [phone] in 1- 2 weeks.   Others please follow up with Dr. Michael Nissenbaum [phone]

## 2021-12-09 NOTE — PROGRESS NOTE ADULT - ASSESSMENT
70yo M w/ PMHx of CAD (s/p CABG 2019), CKD (unknown stage), DM2, Parkinson's Disease, HTN, depression presents with new onset acute heart failure exacerbation, NSTEMI, started on hep gtt, developed bl RP bleed, acute anemia. sp MICU course for hemorrhagic shock, 10/28 sp IR embolization l4 and l5 lumbar artery. for permacath and AVF.    # Acute systolic and diastolic heart failure  # NSTEMI  # ESRD, new HD  # Atrial flutter  # acute hypoxic resp failure  # hemorrhagic shock, left RP hematoma, acute blood loss anemia  # lupus anticoagulant +  Echo TTE mod to severe LV SD, hypokinesis anterior wall, not candidate for cath, medically manage  10/28 sp IR embolization l4 and l5 lumbar artery, h/h stable  HD via permacath per renal, on midodrine during dialysis  sp L AVF - vascular removed staples    # Parkinsons disease   # delirium  currently cannot discharge 2/2 behavior and agitation  cont on depakote seroquel and cymbalta  c/w trihexyphenidyl, carbidopa/levodopa   per neuro, probable early lewy body dementia related paranoia and agitation    # DM2 (diabetes mellitus, type 2)  per endo  hba1c 6.4    # CAD (coronary artery disease)  # HTN  sinus tach, increase lopressor 50mg bid, appreciate cardio recs  c/w atorvastatin  asa on hold    PCP Dr. Simons    DNR/DNI  appreciate palliative fu, family not ready to stop dialysis     72yo M w/ PMHx of CAD (s/p CABG 2019), CKD (unknown stage), DM2, Parkinson's Disease, HTN, depression presents with new onset acute heart failure exacerbation, NSTEMI, started on hep gtt, developed bl RP bleed, acute anemia. sp MICU course for hemorrhagic shock, 10/28 sp IR embolization l4 and l5 lumbar artery. for permacath and AVF.    # Acute systolic and diastolic heart failure  # NSTEMI  # ESRD, new HD  # Atrial flutter  # acute hypoxic resp failure  # hemorrhagic shock, left RP hematoma, acute blood loss anemia  # lupus anticoagulant +  Echo TTE mod to severe LV SD, hypokinesis anterior wall, not candidate for cath, medically manage  10/28 sp IR embolization l4 and l5 lumbar artery, h/h stable  HD via permacath per renal, on midodrine during dialysis  sp L AVF     # Parkinsons disease   # delirium  agitation has improved but remains confused  cont on depakote seroquel and cymbalta  c/w trihexyphenidyl, carbidopa/levodopa   per neuro, probable early lewy body dementia related paranoia and agitation    # DM2 (diabetes mellitus, type 2)  per endo  hba1c 6.4    # CAD (coronary artery disease)  # HTN  sinus tach, increase lopressor 50mg bid, appreciate cardio recs  c/w atorvastatin  asa on hold    PCP Dr. Simons    DNR/DNI  appreciate palliative fu, family not ready to stop dialysis    dc planning

## 2021-12-09 NOTE — PROGRESS NOTE ADULT - SUBJECTIVE AND OBJECTIVE BOX
[facility] [phone]        Patient seen and examined in bed.  He was the same         MEDICATIONS  (STANDING):  atorvastatin 80 milliGRAM(s) Oral at bedtime  carbidopa/levodopa  25/100 2.5 Tablet(s) Oral <User Schedule>  carbidopa/levodopa  25/100 2 Tablet(s) Oral <User Schedule>  chlorhexidine 4% Liquid 1 Application(s) Topical <User Schedule>  cholecalciferol 1000 Unit(s) Oral daily  dextrose 40% Gel 15 Gram(s) Oral once  dextrose 5%. 1000 milliLiter(s) (50 mL/Hr) IV Continuous <Continuous>  dextrose 5%. 1000 milliLiter(s) (100 mL/Hr) IV Continuous <Continuous>  dextrose 50% Injectable 25 Gram(s) IV Push once  dextrose 50% Injectable 12.5 Gram(s) IV Push once  dextrose 50% Injectable 25 Gram(s) IV Push once  diVALproex Sprinkle 250 milliGRAM(s) Oral two times a day  DULoxetine 20 milliGRAM(s) Oral daily  folic acid 1 milliGRAM(s) Oral daily  glucagon  Injectable 1 milliGRAM(s) IntraMuscular once  insulin glargine Injectable (LANTUS) 8 Unit(s) SubCutaneous at bedtime  insulin lispro (ADMELOG) corrective regimen sliding scale   SubCutaneous three times a day before meals  insulin lispro (ADMELOG) corrective regimen sliding scale   SubCutaneous at bedtime  insulin lispro Injectable (ADMELOG) 5 Unit(s) SubCutaneous three times a day before meals  levothyroxine 50 MICROGram(s) Oral daily  memantine 5 milliGRAM(s) Oral at bedtime  metoprolol tartrate 50 milliGRAM(s) Oral two times a day  midodrine 10 milliGRAM(s) Oral <User Schedule>  Nephro-celeste 1 Tablet(s) Oral daily  polyethylene glycol 3350 17 Gram(s) Oral two times a day  QUEtiapine 12.5 milliGRAM(s) Oral three times a day  senna Syrup 10 milliLiter(s) Oral at bedtime  trihexyphenidyl 2 milliGRAM(s) Oral three times a day      VITAL:  T(C): , Max: 36.7 (12-08-21 @ 11:43)  T(F): , Max: 98 (12-08-21 @ 11:43)  HR: 99 (12-09-21 @ 05:16)  BP: 123/84 (12-09-21 @ 05:16)  BP(mean): --  RR: 18 (12-09-21 @ 05:16)  SpO2: 97% (12-09-21 @ 05:16)  Wt(kg): --    I and O's:    12-08 @ 07:01  -  12-09 @ 07:00  --------------------------------------------------------  IN: 720 mL / OUT: 0 mL / NET: 720 mL          PHYSICAL EXAM:    Constitutional: NAD  Neck:  No JVD  Respiratory: CTAB/L  Cardiovascular: S1 and S2  Gastrointestinal: BS+, soft, NT/ND  Extremities: No peripheral edema  Neurological: A/O x 3, no focal deficits  Psychiatric: Normal mood, normal affect  : No Javier  Skin: No rashes  Access: avf and perm cath       LABS:                        8.1    8.89  )-----------( 238      ( 09 Dec 2021 05:36 )             27.1     12-09    131<L>  |  92<L>  |  102<H>  ----------------------------<  217<H>  5.3   |  20<L>  |  7.37<H>    Ca    9.7      09 Dec 2021 05:33  Phos  6.9     12-09  Mg     2.2     12-09            Urine Studies:          RADIOLOGY & ADDITIONAL STUDIES:

## 2021-12-09 NOTE — PROGRESS NOTE ADULT - ASSESSMENT
Assessment  DMT2: 71y Male with DM T2 with hyperglycemia, A1C 6.4%, was on insulin at home, now on low-dose basal bolus insulin, first bolus dose with dinner yesterday, blood sugars are fluctuating, had hypoglycemic episode yesterday, NAD, eating meals.  Hypothyroidism: Patient has no history thyroid disease, was not on any thyroid supplements, subclinical with low-normal FT4, lethargic, started on synthroid 25 mcg po daily, repeat FT4 trending down..  CHF: on medications, stable, monitored.  HTN: Controlled,  on antihypertensive medications.  Parkinsons: on meds, monitored.  CKD: Monitor labs/BMP      Kelsie Reece MD  Cell: [phone]  Office: [phone]     Assessment  DMT2: 71y Male with DM T2 with hyperglycemia, A1C 6.4%, was on insulin at home, now on low-dose basal bolus insulin, first bolus dose with dinner yesterday, blood sugars  are fluctuating, had hypoglycemic episode yesterday, NAD, eating meals.  Hypothyroidism: Patient has no history thyroid disease, was not on any thyroid supplements, subclinical with low-normal FT4, lethargic, started on synthroid 25 mcg po daily, repeat FT4 trending down..  CHF: on medications, stable, monitored.  HTN: Controlled,  on antihypertensive medications.  Parkinsons: on meds, monitored.  CKD: Monitor labs/BMP      Kelsie Reece MD  Cell: [phone]  Office: [phone]

## 2021-12-09 NOTE — PROGRESS NOTE ADULT - PROBLEM SELECTOR PLAN 1
Please hold standing-dose Admelog if patient is not eating.  Will continue Lantus 8u at bedtime.  Will decrease Admelog to 3u before meals and continue coverage scale ac/hs. Will continue monitoring FS and FU.  Patient previously on large-dose basal bolus insulin. Suggest DC on current inpatient insulin regimen, endo FU 4 weeks.   for compliance with consistent low carb diet.

## 2021-12-09 NOTE — PROGRESS NOTE ADULT - SUBJECTIVE AND OBJECTIVE BOX
Chief complaint  Patient is a 71y old  Male who presents with a chief complaint of leg swelling (09 Dec 2021 11:04)   Review of systems  Patient in bed, looks comfortable, had hypoglycemic episode.    Labs and Fingersticks  CAPILLARY BLOOD GLUCOSE      POCT Blood Glucose.: 208 mg/dL (09 Dec 2021 11:50)  POCT Blood Glucose.: 224 mg/dL (09 Dec 2021 07:46)  POCT Blood Glucose.: 131 mg/dL (08 Dec 2021 22:28)  POCT Blood Glucose.: 30 mg/dL (08 Dec 2021 21:57)  POCT Blood Glucose.: 32 mg/dL (08 Dec 2021 21:56)  POCT Blood Glucose.: 174 mg/dL (08 Dec 2021 16:51)      Anion Gap, Serum: 19 *H* (12-09 @ 05:33)  Anion Gap, Serum: 17 (12-08 @ 05:42)      Calcium, Total Serum: 9.7 (12-09 @ 05:33)  Calcium, Total Serum: 9.6 (12-08 @ 05:42)          12-09    131<L>  |  92<L>  |  102<H>  ----------------------------<  217<H>  5.3   |  20<L>  |  7.37<H>    Ca    9.7      09 Dec 2021 05:33  Phos  6.9     12-09  Mg     2.2     12-09                          8.1    8.89  )-----------( 238      ( 09 Dec 2021 05:36 )             27.1     Medications  MEDICATIONS  (STANDING):  atorvastatin 80 milliGRAM(s) Oral at bedtime  carbidopa/levodopa  25/100 2.5 Tablet(s) Oral <User Schedule>  carbidopa/levodopa  25/100 2 Tablet(s) Oral <User Schedule>  chlorhexidine 4% Liquid 1 Application(s) Topical <User Schedule>  cholecalciferol 1000 Unit(s) Oral daily  dextrose 40% Gel 15 Gram(s) Oral once  dextrose 5%. 1000 milliLiter(s) (50 mL/Hr) IV Continuous <Continuous>  dextrose 5%. 1000 milliLiter(s) (100 mL/Hr) IV Continuous <Continuous>  dextrose 50% Injectable 25 Gram(s) IV Push once  dextrose 50% Injectable 12.5 Gram(s) IV Push once  dextrose 50% Injectable 25 Gram(s) IV Push once  diVALproex Sprinkle 250 milliGRAM(s) Oral two times a day  DULoxetine 20 milliGRAM(s) Oral daily  folic acid 1 milliGRAM(s) Oral daily  glucagon  Injectable 1 milliGRAM(s) IntraMuscular once  insulin glargine Injectable (LANTUS) 8 Unit(s) SubCutaneous at bedtime  insulin lispro (ADMELOG) corrective regimen sliding scale   SubCutaneous three times a day before meals  insulin lispro (ADMELOG) corrective regimen sliding scale   SubCutaneous at bedtime  insulin lispro Injectable (ADMELOG) 3 Unit(s) SubCutaneous three times a day before meals  levothyroxine 50 MICROGram(s) Oral daily  memantine 5 milliGRAM(s) Oral at bedtime  metoprolol tartrate 50 milliGRAM(s) Oral two times a day  midodrine 10 milliGRAM(s) Oral <User Schedule>  Nephro-celeste 1 Tablet(s) Oral daily  polyethylene glycol 3350 17 Gram(s) Oral two times a day  QUEtiapine 12.5 milliGRAM(s) Oral three times a day  senna Syrup 10 milliLiter(s) Oral at bedtime  trihexyphenidyl 2 milliGRAM(s) Oral three times a day      Physical Exam  General: Patient comfortable in bed  Vital Signs Last 12 Hrs  T(F): 98.1 (12-09-21 @ 11:16), Max: 98.1 (12-09-21 @ 11:16)  HR: 74 (12-09-21 @ 11:16) (74 - 99)  BP: 114/67 (12-09-21 @ 11:16) (114/67 - 123/84)  BP(mean): --  RR: 18 (12-09-21 @ 11:16) (18 - 18)  SpO2: 96% (12-09-21 @ 11:16) (96% - 97%)  Neck: No palpable thyroid nodules.  CVS: S1S2, No murmurs  Respiratory: No wheezing, no crepitations  GI: Abdomen soft, bowel sounds positive  Musculoskeletal:  edema lower extremities.   Skin: No skin rashes, no ecchymosis    Diagnostics    Free Thyroxine, Serum: AM Sched. Collection: 08-Dec-2021 06:00 (12-07 @ 14:26)         Chief complaint  Patient is a 71y old  Male who presents with a chief complaint of leg swelling (09 Dec 2021 11:04)   Review of systems  Patient in bed, looks comfortable, had hypoglycemic episode.    Labs and Fingersticks  CAPILLARY BLOOD GLUCOSE      POCT Blood Glucose.: 208 mg/dL (09 Dec 2021 11:50)  POCT Blood Glucose.: 224 mg/dL (09 Dec 2021 07:46)  POCT Blood Glucose.: 131 mg/dL (08 Dec 2021 22:28)  POCT Blood Glucose.: 30 mg/dL (08 Dec 2021 21:57)  POCT Blood Glucose.: 32 mg/dL (08 Dec 2021 21:56)  POCT Blood Glucose.: 174 mg/dL (08 Dec 2021 16:51)      Anion Gap, Serum: 19 *H* (12-09 @ 05:33)  Anion Gap, Serum: 17 (12-08 @ 05:42)      Calcium, Total Serum: 9.7 (12-09 @ 05:33)  Calcium, Total Serum: 9.6 (12-08 @ 05:42)          12-09    131<L>  |  92<L>  |  102<H>  ----------------------------<  217<H>  5.3   |  20<L>  |  7.37<H>    Ca    9.7      09 Dec 2021 05:33  Phos  6.9     12-09  Mg     2.2     12-09                          8.1    8.89  )-----------( 238      ( 09 Dec 2021 05:36 )             27.1     Medications  MEDICATIONS  (STANDING):  atorvastatin 80 milliGRAM(s) Oral at bedtime  carbidopa/levodopa  25/100 2.5 Tablet(s) Oral <User Schedule>  carbidopa/levodopa  25/100 2 Tablet(s) Oral <User Schedule>  chlorhexidine 4% Liquid 1 Application(s) Topical <User Schedule>  cholecalciferol 1000 Unit(s) Oral daily  dextrose 40% Gel 15 Gram(s) Oral once  dextrose 5%. 1000 milliLiter(s) (50 mL/Hr) IV Continuous <Continuous>  dextrose 5%. 1000 milliLiter(s) (100 mL/Hr) IV Continuous <Continuous>  dextrose 50% Injectable 25 Gram(s) IV Push once  dextrose 50% Injectable 12.5 Gram(s) IV Push once  dextrose 50% Injectable 25 Gram(s) IV Push once  diVALproex Sprinkle 250 milliGRAM(s) Oral two times a day  DULoxetine 20 milliGRAM(s) Oral daily  folic acid 1 milliGRAM(s) Oral daily  glucagon  Injectable 1 milliGRAM(s) IntraMuscular once  insulin glargine Injectable (LANTUS) 8 Unit(s) SubCutaneous at bedtime  insulin lispro (ADMELOG) corrective regimen sliding scale   SubCutaneous three times a day before meals  insulin lispro (ADMELOG) corrective regimen sliding scale   SubCutaneous at bedtime  insulin lispro Injectable (ADMELOG) 3 Unit(s) SubCutaneous three times a day before meals  levothyroxine 50 MICROGram(s) Oral daily  memantine 5 milliGRAM(s) Oral at bedtime  metoprolol tartrate 50 milliGRAM(s) Oral two times a day  midodrine 10 milliGRAM(s) Oral <User Schedule>  Nephro-celeste 1 Tablet(s) Oral daily  polyethylene glycol 3350 17 Gram(s) Oral two times a day  QUEtiapine 12.5 milliGRAM(s) Oral three times a day  senna Syrup 10 milliLiter(s) Oral at bedtime  trihexyphenidyl 2 milliGRAM(s) Oral three times a day      Physical Exam  General: Patient comfortable in bed  Vital Signs Last 12 Hrs  T(F): 98.1 (12-09-21 @ 11:16), Max: 98.1 (12-09-21 @ 11:16)  HR: 74 (12-09-21 @ 11:16) (74 - 99)  BP: 114/67 (12-09-21 @ 11:16) (114/67 - 123/84)  BP(mean): --  RR: 18 (12-09-21 @ 11:16) (18 - 18)  SpO2: 96% (12-09-21 @ 11:16) (96% - 97%)  Neck: No palpable thyroid nodules.  CVS: S1S2, No murmurs  Respiratory: No wheezing, no crepitations  GI: Abdomen soft, bowel sounds positive  Musculoskeletal:  edema lower extremities.   Skin: No skin rashes, no ecchymosis    Diagnostics    Free Thyroxine, Serum: AM Sched. Collection: 08-Dec-2021 06:00 (12-07 @ 14:26)

## 2021-12-09 NOTE — PROGRESS NOTE ADULT - SUBJECTIVE AND OBJECTIVE BOX
SUBJECTIVE / OVERNIGHT EVENTS:          --------------------------------------------------------------------------------------------  LABS:                        8.1    8.89  )-----------( 238      ( 09 Dec 2021 05:36 )             27.1     12-09    131<L>  |  92<L>  |  102<H>  ----------------------------<  217<H>  5.3   |  20<L>  |  7.37<H>    Ca    9.7      09 Dec 2021 05:33  Phos  6.9     12-09  Mg     2.2     12-09        CAPILLARY BLOOD GLUCOSE      POCT Blood Glucose.: 224 mg/dL (09 Dec 2021 07:46)  POCT Blood Glucose.: 131 mg/dL (08 Dec 2021 22:28)  POCT Blood Glucose.: 30 mg/dL (08 Dec 2021 21:57)  POCT Blood Glucose.: 32 mg/dL (08 Dec 2021 21:56)  POCT Blood Glucose.: 174 mg/dL (08 Dec 2021 16:51)  POCT Blood Glucose.: 275 mg/dL (08 Dec 2021 11:41)            RADIOLOGY & ADDITIONAL TESTS:    Imaging Personally Reviewed:  [x] YES  [ ] NO    Consultant(s) Notes Reviewed:  [x] YES  [ ] NO    MEDICATIONS  (STANDING):  atorvastatin 80 milliGRAM(s) Oral at bedtime  carbidopa/levodopa  25/100 2.5 Tablet(s) Oral <User Schedule>  carbidopa/levodopa  25/100 2 Tablet(s) Oral <User Schedule>  chlorhexidine 4% Liquid 1 Application(s) Topical <User Schedule>  cholecalciferol 1000 Unit(s) Oral daily  dextrose 40% Gel 15 Gram(s) Oral once  dextrose 5%. 1000 milliLiter(s) (50 mL/Hr) IV Continuous <Continuous>  dextrose 5%. 1000 milliLiter(s) (100 mL/Hr) IV Continuous <Continuous>  dextrose 50% Injectable 25 Gram(s) IV Push once  dextrose 50% Injectable 12.5 Gram(s) IV Push once  dextrose 50% Injectable 25 Gram(s) IV Push once  diVALproex Sprinkle 250 milliGRAM(s) Oral two times a day  DULoxetine 20 milliGRAM(s) Oral daily  folic acid 1 milliGRAM(s) Oral daily  glucagon  Injectable 1 milliGRAM(s) IntraMuscular once  insulin glargine Injectable (LANTUS) 8 Unit(s) SubCutaneous at bedtime  insulin lispro (ADMELOG) corrective regimen sliding scale   SubCutaneous three times a day before meals  insulin lispro (ADMELOG) corrective regimen sliding scale   SubCutaneous at bedtime  insulin lispro Injectable (ADMELOG) 5 Unit(s) SubCutaneous three times a day before meals  levothyroxine 50 MICROGram(s) Oral daily  memantine 5 milliGRAM(s) Oral at bedtime  metoprolol tartrate 50 milliGRAM(s) Oral two times a day  midodrine 10 milliGRAM(s) Oral <User Schedule>  Nephro-celeste 1 Tablet(s) Oral daily  polyethylene glycol 3350 17 Gram(s) Oral two times a day  QUEtiapine 12.5 milliGRAM(s) Oral three times a day  senna Syrup 10 milliLiter(s) Oral at bedtime  trihexyphenidyl 2 milliGRAM(s) Oral three times a day    MEDICATIONS  (PRN):  acetaminophen     Tablet .. 650 milliGRAM(s) Oral every 6 hours PRN Mild Pain (1 - 3)  albuterol/ipratropium for Nebulization 3 milliLiter(s) Nebulizer every 6 hours PRN Shortness of Breath and/or Wheezing  diVALproex Sprinkle 125 milliGRAM(s) Oral every 8 hours PRN Agitation  guaiFENesin Oral Liquid (Sugar-Free) 200 milliGRAM(s) Oral every 6 hours PRN Cough  QUEtiapine 12.5 milliGRAM(s) Oral every 6 hours PRN agitation  sodium chloride 0.9% lock flush 10 milliLiter(s) IV Push every 1 hour PRN Pre/post blood products, medications, blood draw, and to maintain line patency      Care Discussed with Consultants/Other Providers [x] YES  [ ] NO    Vital Signs Last 24 Hrs  T(C): 36.4 (09 Dec 2021 05:16), Max: 36.7 (08 Dec 2021 11:43)  T(F): 97.5 (09 Dec 2021 05:16), Max: 98 (08 Dec 2021 11:43)  HR: 99 (09 Dec 2021 05:16) (84 - 99)  BP: 123/84 (09 Dec 2021 05:16) (111/67 - 127/66)  BP(mean): --  RR: 18 (09 Dec 2021 05:16) (18 - 18)  SpO2: 97% (09 Dec 2021 05:16) (97% - 98%)  I&O's Summary    08 Dec 2021 07:01  -  09 Dec 2021 07:00  --------------------------------------------------------  IN: 720 mL / OUT: 0 mL / NET: 720 mL    PE:  GENERAL: NAD, AAOx2  HEAD:  Atraumatic, Normocephalic  CHEST/LUNG: CTABL, No wheeze, right neck permacath  HEART: Regular rate and rhythm; No murmurs, rubs, or gallops  ABDOMEN: Soft, Nontender, Nondistended; Bowel sounds present  EXTREMITIES:  2+ Peripheral Pulses, No clubbing, cyanosis, or edema, left avf staples removed  SKIN: No rashes or lesions       SUBJECTIVE / OVERNIGHT EVENTS:    calm. overnight reported trying to pull at permacath. does not have mittens. pt remains confused but not agitated      --------------------------------------------------------------------------------------------  LABS:                        8.1    8.89  )-----------( 238      ( 09 Dec 2021 05:36 )             27.1     12-09    131<L>  |  92<L>  |  102<H>  ----------------------------<  217<H>  5.3   |  20<L>  |  7.37<H>    Ca    9.7      09 Dec 2021 05:33  Phos  6.9     12-09  Mg     2.2     12-09        CAPILLARY BLOOD GLUCOSE      POCT Blood Glucose.: 224 mg/dL (09 Dec 2021 07:46)  POCT Blood Glucose.: 131 mg/dL (08 Dec 2021 22:28)  POCT Blood Glucose.: 30 mg/dL (08 Dec 2021 21:57)  POCT Blood Glucose.: 32 mg/dL (08 Dec 2021 21:56)  POCT Blood Glucose.: 174 mg/dL (08 Dec 2021 16:51)  POCT Blood Glucose.: 275 mg/dL (08 Dec 2021 11:41)            RADIOLOGY & ADDITIONAL TESTS:    Imaging Personally Reviewed:  [x] YES  [ ] NO    Consultant(s) Notes Reviewed:  [x] YES  [ ] NO    MEDICATIONS  (STANDING):  atorvastatin 80 milliGRAM(s) Oral at bedtime  carbidopa/levodopa  25/100 2.5 Tablet(s) Oral <User Schedule>  carbidopa/levodopa  25/100 2 Tablet(s) Oral <User Schedule>  chlorhexidine 4% Liquid 1 Application(s) Topical <User Schedule>  cholecalciferol 1000 Unit(s) Oral daily  dextrose 40% Gel 15 Gram(s) Oral once  dextrose 5%. 1000 milliLiter(s) (50 mL/Hr) IV Continuous <Continuous>  dextrose 5%. 1000 milliLiter(s) (100 mL/Hr) IV Continuous <Continuous>  dextrose 50% Injectable 25 Gram(s) IV Push once  dextrose 50% Injectable 12.5 Gram(s) IV Push once  dextrose 50% Injectable 25 Gram(s) IV Push once  diVALproex Sprinkle 250 milliGRAM(s) Oral two times a day  DULoxetine 20 milliGRAM(s) Oral daily  folic acid 1 milliGRAM(s) Oral daily  glucagon  Injectable 1 milliGRAM(s) IntraMuscular once  insulin glargine Injectable (LANTUS) 8 Unit(s) SubCutaneous at bedtime  insulin lispro (ADMELOG) corrective regimen sliding scale   SubCutaneous three times a day before meals  insulin lispro (ADMELOG) corrective regimen sliding scale   SubCutaneous at bedtime  insulin lispro Injectable (ADMELOG) 5 Unit(s) SubCutaneous three times a day before meals  levothyroxine 50 MICROGram(s) Oral daily  memantine 5 milliGRAM(s) Oral at bedtime  metoprolol tartrate 50 milliGRAM(s) Oral two times a day  midodrine 10 milliGRAM(s) Oral <User Schedule>  Nephro-celeste 1 Tablet(s) Oral daily  polyethylene glycol 3350 17 Gram(s) Oral two times a day  QUEtiapine 12.5 milliGRAM(s) Oral three times a day  senna Syrup 10 milliLiter(s) Oral at bedtime  trihexyphenidyl 2 milliGRAM(s) Oral three times a day    MEDICATIONS  (PRN):  acetaminophen     Tablet .. 650 milliGRAM(s) Oral every 6 hours PRN Mild Pain (1 - 3)  albuterol/ipratropium for Nebulization 3 milliLiter(s) Nebulizer every 6 hours PRN Shortness of Breath and/or Wheezing  diVALproex Sprinkle 125 milliGRAM(s) Oral every 8 hours PRN Agitation  guaiFENesin Oral Liquid (Sugar-Free) 200 milliGRAM(s) Oral every 6 hours PRN Cough  QUEtiapine 12.5 milliGRAM(s) Oral every 6 hours PRN agitation  sodium chloride 0.9% lock flush 10 milliLiter(s) IV Push every 1 hour PRN Pre/post blood products, medications, blood draw, and to maintain line patency      Care Discussed with Consultants/Other Providers [x] YES  [ ] NO    Vital Signs Last 24 Hrs  T(C): 36.4 (09 Dec 2021 05:16), Max: 36.7 (08 Dec 2021 11:43)  T(F): 97.5 (09 Dec 2021 05:16), Max: 98 (08 Dec 2021 11:43)  HR: 99 (09 Dec 2021 05:16) (84 - 99)  BP: 123/84 (09 Dec 2021 05:16) (111/67 - 127/66)  BP(mean): --  RR: 18 (09 Dec 2021 05:16) (18 - 18)  SpO2: 97% (09 Dec 2021 05:16) (97% - 98%)  I&O's Summary    08 Dec 2021 07:01  -  09 Dec 2021 07:00  --------------------------------------------------------  IN: 720 mL / OUT: 0 mL / NET: 720 mL    PE:  GENERAL: NAD, AAOx1  HEAD:  Atraumatic, Normocephalic  CHEST/LUNG: CTABL, No wheeze, right neck permacath  HEART: Regular rate and rhythm; No murmurs, rubs, or gallops  ABDOMEN: Soft, Nontender, Nondistended; Bowel sounds present  EXTREMITIES:  2+ Peripheral Pulses, No clubbing, cyanosis, or edema, left avf   SKIN: No rashes or lesions, chroic venous stasis

## 2021-12-09 NOTE — PROGRESS NOTE ADULT - ASSESSMENT
ASSESSMENT/PLAN:  72yo M w/ PMHx of CAD (s/p CABG 2019), CKD (unknown stage), DM2, Parkinson's Disease, HTN, depression presents with bilateral leg swelling  ESRD   Severe LV dysfunction; A flutter   Confusion -       1 Palliation - Intermittent confusion   2 Renal-  Next HD today with epo   3 CVS- BP controlled at present, on Midodrine (with hold parameters in place, SBP> 160),  Lopressor for heart rate control   4 Anemia -  Retacrit 10k units with  HD   5 Vasc - s/p  LUE AVF  and now all sutures were removed        Palliative care f/u eval noted.   Family wants to cont HD     Sayed [facility]   [phone]

## 2021-12-10 NOTE — PROGRESS NOTE ADULT - PROBLEM SELECTOR PLAN 1
Please hold standing-dose Admelog if patient is not eating.  Will continue current insulin regimen for now. Will continue monitoring FS and FU.  Patient previously on large-dose basal bolus insulin. Suggest DC on current inpatient insulin regimen, endo FU 4 weeks.   for compliance with consistent low carb diet.

## 2021-12-10 NOTE — PROGRESS NOTE ADULT - SUBJECTIVE AND OBJECTIVE BOX
Patient is a 71y old  Male who presents with a chief complaint of leg swelling (10 Dec 2021 09:40)      SUBJECTIVE / OVERNIGHT EVENTS:    Patient seen and examined. calm. no complaints. still confused. less resistance.      Vital Signs Last 24 Hrs  T(C): 36.8 (10 Dec 2021 05:18), Max: 36.8 (09 Dec 2021 16:40)  T(F): 98.3 (10 Dec 2021 05:18), Max: 98.3 (10 Dec 2021 05:18)  HR: 88 (10 Dec 2021 05:18) (58 - 104)  BP: 118/74 (10 Dec 2021 05:18) (108/68 - 121/72)  BP(mean): --  RR: 18 (10 Dec 2021 05:18) (17 - 18)  SpO2: 95% (10 Dec 2021 05:18) (95% - 98%)  I&O's Summary    09 Dec 2021 07:01  -  10 Dec 2021 07:00  --------------------------------------------------------  IN: 1020 mL / OUT: 1400 mL / NET: -380 mL    10 Dec 2021 07:01  -  10 Dec 2021 11:05  --------------------------------------------------------  IN: 60 mL / OUT: 100 mL / NET: -40 mL        PE:  GENERAL: NAD, AAOx1  HEAD:  Atraumatic, Normocephalic  CHEST/LUNG: CTABL, No wheeze, right neck permacath  HEART: Regular rate and rhythm; No murmurs, rubs, or gallops  ABDOMEN: Soft, Nontender, Nondistended; Bowel sounds present  EXTREMITIES:  2+ Peripheral Pulses, No clubbing, cyanosis, or edema, left avf   SKIN: No rashes or lesions, chronic venous stasis    LABS:                        8.1    8.89  )-----------( 238      ( 09 Dec 2021 05:36 )             27.1     12-09    131<L>  |  92<L>  |  102<H>  ----------------------------<  217<H>  5.3   |  20<L>  |  7.37<H>    Ca    9.7      09 Dec 2021 05:33  Phos  6.9     12-09  Mg     2.2     12-09        CAPILLARY BLOOD GLUCOSE      POCT Blood Glucose.: 158 mg/dL (10 Dec 2021 07:43)  POCT Blood Glucose.: 122 mg/dL (09 Dec 2021 20:35)  POCT Blood Glucose.: 193 mg/dL (09 Dec 2021 16:18)  POCT Blood Glucose.: 208 mg/dL (09 Dec 2021 11:50)            RADIOLOGY & ADDITIONAL TESTS:    Imaging Personally Reviewed:  [x] YES  [ ] NO    Consultant(s) Notes Reviewed:  [x] YES  [ ] NO    MEDICATIONS  (STANDING):  atorvastatin 80 milliGRAM(s) Oral at bedtime  carbidopa/levodopa  25/100 2.5 Tablet(s) Oral <User Schedule>  carbidopa/levodopa  25/100 2 Tablet(s) Oral <User Schedule>  chlorhexidine 4% Liquid 1 Application(s) Topical <User Schedule>  cholecalciferol 1000 Unit(s) Oral daily  dextrose 40% Gel 15 Gram(s) Oral once  dextrose 5%. 1000 milliLiter(s) (100 mL/Hr) IV Continuous <Continuous>  dextrose 5%. 1000 milliLiter(s) (50 mL/Hr) IV Continuous <Continuous>  dextrose 50% Injectable 25 Gram(s) IV Push once  dextrose 50% Injectable 12.5 Gram(s) IV Push once  dextrose 50% Injectable 25 Gram(s) IV Push once  diVALproex Sprinkle 250 milliGRAM(s) Oral two times a day  DULoxetine 20 milliGRAM(s) Oral daily  folic acid 1 milliGRAM(s) Oral daily  glucagon  Injectable 1 milliGRAM(s) IntraMuscular once  insulin glargine Injectable (LANTUS) 8 Unit(s) SubCutaneous at bedtime  insulin lispro (ADMELOG) corrective regimen sliding scale   SubCutaneous three times a day before meals  insulin lispro (ADMELOG) corrective regimen sliding scale   SubCutaneous at bedtime  insulin lispro Injectable (ADMELOG) 3 Unit(s) SubCutaneous three times a day before meals  levothyroxine 50 MICROGram(s) Oral daily  memantine 5 milliGRAM(s) Oral at bedtime  metoprolol tartrate 50 milliGRAM(s) Oral two times a day  midodrine 10 milliGRAM(s) Oral <User Schedule>  Nephro-celeste 1 Tablet(s) Oral daily  polyethylene glycol 3350 17 Gram(s) Oral two times a day  QUEtiapine 12.5 milliGRAM(s) Oral three times a day  senna Syrup 10 milliLiter(s) Oral at bedtime  trihexyphenidyl 2 milliGRAM(s) Oral three times a day    MEDICATIONS  (PRN):  acetaminophen     Tablet .. 650 milliGRAM(s) Oral every 6 hours PRN Mild Pain (1 - 3)  albuterol/ipratropium for Nebulization 3 milliLiter(s) Nebulizer every 6 hours PRN Shortness of Breath and/or Wheezing  diVALproex Sprinkle 125 milliGRAM(s) Oral every 8 hours PRN Agitation  guaiFENesin Oral Liquid (Sugar-Free) 200 milliGRAM(s) Oral every 6 hours PRN Cough  QUEtiapine 12.5 milliGRAM(s) Oral every 6 hours PRN agitation  sodium chloride 0.9% lock flush 10 milliLiter(s) IV Push every 1 hour PRN Pre/post blood products, medications, blood draw, and to maintain line patency      Care Discussed with Consultants/Other Providers [x] YES  [ ] NO    HEALTH ISSUES - PROBLEM Dx:  Acute heart failure    Atrial flutter    DM2 (diabetes mellitus, type 2)    CAD (coronary artery disease)    Parkinsons disease    Acute on chronic renal failure    NSTEMI (non-ST elevation myocardial infarction)    Hypertension    Acute kidney injury superimposed on CKD    Anemia    Hypothyroidism    Delirium    Advanced care planning/counseling discussion    Palliative care status    Palliative care encounter    Pain    Stage 5 chronic kidney disease not on chronic dialysis

## 2021-12-10 NOTE — PROGRESS NOTE ADULT - ASSESSMENT
Assessment  DMT2: 71y Male with DM T2 with hyperglycemia, A1C 6.4%, was on insulin at home, now on low-dose basal bolus insulin, decreased bolus dose yesterday, blood sugars improving, no new hypoglycemias, NAD, eating meals.  Hypothyroidism: Patient has no history thyroid disease, was not on any thyroid supplements, subclinical with low-normal FT4, lethargic, started on synthroid 25 mcg po daily, repeat FT4 trending down, increased to synthroid 50 mcg po daily.  CHF: on medications, stable, monitored.  HTN: Controlled,  on antihypertensive medications.  Parkinsons: on meds, monitored.  CKD: Monitor labs/BMP      Kelsie Reece MD  Cell: [phone]  Office: [phone]     Assessment  DMT2: 71y Male with DM T2 with hyperglycemia, A1C 6.4%, was on insulin at home, now on low-dose basal bolus insulin, decreased bolus dose yesterday, blood sugars improving, no new hypoglycemias,  NAD, eating meals.  Hypothyroidism: Patient has no history thyroid disease, was not on any thyroid supplements, subclinical with low-normal FT4, lethargic, started on synthroid 25 mcg po daily, repeat FT4 trending down, increased to synthroid 50 mcg po daily.  CHF: on medications, stable, monitored.  HTN: Controlled,  on antihypertensive medications.  Parkinsons: on meds, monitored.  CKD: Monitor labs/BMP      Kelsie Reece MD  Cell: [phone]  Office: [phone]

## 2021-12-10 NOTE — PROGRESS NOTE ADULT - SUBJECTIVE AND OBJECTIVE BOX
[facility] [phone]        Patient seen and examined in bed.  He was the same         MEDICATIONS  (STANDING):  atorvastatin 80 milliGRAM(s) Oral at bedtime  carbidopa/levodopa  25/100 2.5 Tablet(s) Oral <User Schedule>  carbidopa/levodopa  25/100 2 Tablet(s) Oral <User Schedule>  chlorhexidine 4% Liquid 1 Application(s) Topical <User Schedule>  cholecalciferol 1000 Unit(s) Oral daily  dextrose 40% Gel 15 Gram(s) Oral once  dextrose 5%. 1000 milliLiter(s) (50 mL/Hr) IV Continuous <Continuous>  dextrose 5%. 1000 milliLiter(s) (100 mL/Hr) IV Continuous <Continuous>  dextrose 50% Injectable 25 Gram(s) IV Push once  dextrose 50% Injectable 12.5 Gram(s) IV Push once  dextrose 50% Injectable 25 Gram(s) IV Push once  diVALproex Sprinkle 250 milliGRAM(s) Oral two times a day  DULoxetine 20 milliGRAM(s) Oral daily  folic acid 1 milliGRAM(s) Oral daily  glucagon  Injectable 1 milliGRAM(s) IntraMuscular once  insulin glargine Injectable (LANTUS) 8 Unit(s) SubCutaneous at bedtime  insulin lispro (ADMELOG) corrective regimen sliding scale   SubCutaneous three times a day before meals  insulin lispro (ADMELOG) corrective regimen sliding scale   SubCutaneous at bedtime  insulin lispro Injectable (ADMELOG) 3 Unit(s) SubCutaneous three times a day before meals  levothyroxine 50 MICROGram(s) Oral daily  memantine 5 milliGRAM(s) Oral at bedtime  metoprolol tartrate 50 milliGRAM(s) Oral two times a day  midodrine 10 milliGRAM(s) Oral <User Schedule>  Nephro-celeste 1 Tablet(s) Oral daily  polyethylene glycol 3350 17 Gram(s) Oral two times a day  QUEtiapine 12.5 milliGRAM(s) Oral three times a day  senna Syrup 10 milliLiter(s) Oral at bedtime  trihexyphenidyl 2 milliGRAM(s) Oral three times a day      VITAL:  T(C): , Max: 36.8 (12-09-21 @ 16:40)  T(F): , Max: 98.3 (12-10-21 @ 05:18)  HR: 88 (12-10-21 @ 05:18)  BP: 118/74 (12-10-21 @ 05:18)  BP(mean): --  RR: 18 (12-10-21 @ 05:18)  SpO2: 95% (12-10-21 @ 05:18)  Wt(kg): --    I and O's:    12-09 @ 07:01  -  12-10 @ 07:00  --------------------------------------------------------  IN: 1020 mL / OUT: 1400 mL / NET: -380 mL    12-10 @ 07:01  -  12-10 @ 09:40  --------------------------------------------------------  IN: 60 mL / OUT: 100 mL / NET: -40 mL          PHYSICAL EXAM:    Constitutional: NAD  Neck:  No JVD  Respiratory: CTAB/L  Cardiovascular: S1 and S2  Gastrointestinal: BS+, soft, NT/ND  Extremities: No peripheral edema  Neurological:  , no focal deficits  Psychiatric: Normal mood, normal affect  : No Javier  Skin: No rashes  Access: perm cath and avf     LABS:                        8.1    8.89  )-----------( 238      ( 09 Dec 2021 05:36 )             27.1     12-09    131<L>  |  92<L>  |  102<H>  ----------------------------<  217<H>  5.3   |  20<L>  |  7.37<H>    Ca    9.7      09 Dec 2021 05:33  Phos  6.9     12-09  Mg     2.2     12-09            Urine Studies:          RADIOLOGY & ADDITIONAL STUDIES:

## 2021-12-10 NOTE — PROGRESS NOTE ADULT - ASSESSMENT
72yo M w/ PMHx of CAD (s/p CABG 2019), CKD (unknown stage), DM2, Parkinson's Disease, HTN, depression presents with new onset acute heart failure exacerbation, NSTEMI, started on hep gtt, developed bl RP bleed, acute anemia. sp MICU course for hemorrhagic shock, 10/28 sp IR embolization l4 and l5 lumbar artery. sp permacath and AVF.    # Acute systolic and diastolic heart failure  # NSTEMI  # ESRD, new HD  # Atrial flutter  # acute hypoxic resp failure  # hemorrhagic shock, left RP hematoma, acute blood loss anemia  # lupus anticoagulant +  Echo TTE mod to severe LV SD, hypokinesis anterior wall, not candidate for cath, medically manage  10/28 sp IR embolization l4 and l5 lumbar artery, h/h stable  HD via permacath per renal, on midodrine during dialysis  sp L AVF     # Parkinsons disease   # delirium  agitation has improved but remains confused  cont on depakote seroquel and cymbalta  c/w trihexyphenidyl, carbidopa/levodopa   per neuro, probable early lewy body dementia related paranoia and agitation    # DM2 (diabetes mellitus, type 2)  per endo  hba1c 6.4    # CAD (coronary artery disease)  # HTN  sinus tach, increase lopressor 50mg bid, appreciate cardio recs  c/w atorvastatin  asa on hold    PCP Dr. Simons    DNR/DNI  appreciate palliative fu, family not ready to stop dialysis    discussed with HCP roby Otero today - patient is calmer and less aggitated, stable for DC on current regimen, however, still risk patient will pull out permacath at outside facility which could lead to death. Roby Faye verbalizes understanding and agrees to cont dialysis and dc planning.    dc planning, pending [facility] placement with HD

## 2021-12-10 NOTE — PROGRESS NOTE ADULT - ASSESSMENT
ASSESSMENT/PLAN:  70yo M w/ PMHx of CAD (s/p CABG 2019), CKD (unknown stage), DM2, Parkinson's Disease, HTN, depression presents with bilateral leg swelling  ESRD   Severe LV dysfunction; A flutter   Confusion -       1 Palliation - Intermittent confusion   2 Renal-  Next HD in am  with epo   3 CVS- BP controlled at present, on Midodrine (with hold parameters in place, SBP> 160),  Lopressor for heart rate control   4 Anemia -  Retacrit 10k units with  HD   5 Vasc - s/p  LUE AVF  and now all sutures were removed       Palliative care f/u eval noted.   Family wants to cont HD     Sayed [facility]   [phone]

## 2021-12-10 NOTE — PROGRESS NOTE ADULT - SUBJECTIVE AND OBJECTIVE BOX
Chief complaint  Patient is a 71y old  Male who presents with a chief complaint of leg swelling (10 Dec 2021 11:04)   Review of systems  Patient in bed, looks comfortable, no hypoglycemic episodes.    Labs and Fingersticks  CAPILLARY BLOOD GLUCOSE      POCT Blood Glucose.: 123 mg/dL (10 Dec 2021 11:39)  POCT Blood Glucose.: 158 mg/dL (10 Dec 2021 07:43)  POCT Blood Glucose.: 122 mg/dL (09 Dec 2021 20:35)  POCT Blood Glucose.: 193 mg/dL (09 Dec 2021 16:18)      Anion Gap, Serum: 19 *H* (12-09 @ 05:33)      Calcium, Total Serum: 9.7 (12-09 @ 05:33)          12-09    131<L>  |  92<L>  |  102<H>  ----------------------------<  217<H>  5.3   |  20<L>  |  7.37<H>    Ca    9.7      09 Dec 2021 05:33  Phos  6.9     12-09  Mg     2.2     12-09                          8.1    8.89  )-----------( 238      ( 09 Dec 2021 05:36 )             27.1     Medications  MEDICATIONS  (STANDING):  atorvastatin 80 milliGRAM(s) Oral at bedtime  carbidopa/levodopa  25/100 2.5 Tablet(s) Oral <User Schedule>  carbidopa/levodopa  25/100 2 Tablet(s) Oral <User Schedule>  chlorhexidine 4% Liquid 1 Application(s) Topical <User Schedule>  cholecalciferol 1000 Unit(s) Oral daily  dextrose 40% Gel 15 Gram(s) Oral once  dextrose 5%. 1000 milliLiter(s) (50 mL/Hr) IV Continuous <Continuous>  dextrose 5%. 1000 milliLiter(s) (100 mL/Hr) IV Continuous <Continuous>  dextrose 50% Injectable 25 Gram(s) IV Push once  dextrose 50% Injectable 12.5 Gram(s) IV Push once  dextrose 50% Injectable 25 Gram(s) IV Push once  diVALproex Sprinkle 250 milliGRAM(s) Oral two times a day  DULoxetine 20 milliGRAM(s) Oral daily  folic acid 1 milliGRAM(s) Oral daily  glucagon  Injectable 1 milliGRAM(s) IntraMuscular once  insulin glargine Injectable (LANTUS) 8 Unit(s) SubCutaneous at bedtime  insulin lispro (ADMELOG) corrective regimen sliding scale   SubCutaneous three times a day before meals  insulin lispro (ADMELOG) corrective regimen sliding scale   SubCutaneous at bedtime  insulin lispro Injectable (ADMELOG) 3 Unit(s) SubCutaneous three times a day before meals  levothyroxine 50 MICROGram(s) Oral daily  memantine 5 milliGRAM(s) Oral at bedtime  metoprolol tartrate 50 milliGRAM(s) Oral two times a day  midodrine 10 milliGRAM(s) Oral <User Schedule>  Nephro-celeste 1 Tablet(s) Oral daily  polyethylene glycol 3350 17 Gram(s) Oral two times a day  QUEtiapine 12.5 milliGRAM(s) Oral three times a day  senna Syrup 10 milliLiter(s) Oral at bedtime  trihexyphenidyl 2 milliGRAM(s) Oral three times a day      Physical Exam  General: Patient comfortable in bed  Vital Signs Last 12 Hrs  T(F): 98.4 (12-10-21 @ 11:10), Max: 98.4 (12-10-21 @ 11:10)  HR: 85 (12-10-21 @ 11:10) (85 - 88)  BP: 122/71 (12-10-21 @ 11:10) (118/74 - 122/71)  BP(mean): --  RR: 18 (12-10-21 @ 11:10) (18 - 18)  SpO2: 95% (12-10-21 @ 11:10) (95% - 95%)  Neck: No palpable thyroid nodules.  CVS: S1S2, No murmurs  Respiratory: No wheezing, no crepitations  GI: Abdomen soft, bowel sounds positive  Musculoskeletal:  edema lower extremities.   Skin: No skin rashes, no ecchymosis    Diagnostics    Free Thyroxine, Serum: AM Sched. Collection: 08-Dec-2021 06:00 (12-07 @ 14:26)           Chief complaint  Patient is a 71y old  Male who presents with a chief complaint of leg swelling (10 Dec 2021 11:04)   Review of systems  Patient in bed, looks comfortable, no hypoglycemic episodes.    Labs and Fingersticks  CAPILLARY BLOOD GLUCOSE      POCT Blood Glucose.: 123 mg/dL (10 Dec 2021 11:39)  POCT Blood Glucose.: 158 mg/dL (10 Dec 2021 07:43)  POCT Blood Glucose.: 122 mg/dL (09 Dec 2021 20:35)  POCT Blood Glucose.: 193 mg/dL (09 Dec 2021 16:18)      Anion Gap, Serum: 19 *H* (12-09 @ 05:33)      Calcium, Total Serum: 9.7 (12-09 @ 05:33)          12-09    131<L>  |  92<L>  |  102<H>  ----------------------------<  217<H>  5.3   |  20<L>  |  7.37<H>    Ca    9.7      09 Dec 2021 05:33  Phos  6.9     12-09  Mg     2.2     12-09                          8.1    8.89  )-----------( 238      ( 09 Dec 2021 05:36 )             27.1     Medications  MEDICATIONS  (STANDING):  atorvastatin 80 milliGRAM(s) Oral at bedtime  carbidopa/levodopa  25/100 2.5 Tablet(s) Oral <User Schedule>  carbidopa/levodopa  25/100 2 Tablet(s) Oral <User Schedule>  chlorhexidine 4% Liquid 1 Application(s) Topical <User Schedule>  cholecalciferol 1000 Unit(s) Oral daily  dextrose 40% Gel 15 Gram(s) Oral once  dextrose 5%. 1000 milliLiter(s) (50 mL/Hr) IV Continuous <Continuous>  dextrose 5%. 1000 milliLiter(s) (100 mL/Hr) IV Continuous <Continuous>  dextrose 50% Injectable 25 Gram(s) IV Push once  dextrose 50% Injectable 12.5 Gram(s) IV Push once  dextrose 50% Injectable 25 Gram(s) IV Push once  diVALproex Sprinkle 250 milliGRAM(s) Oral two times a day  DULoxetine 20 milliGRAM(s) Oral daily  folic acid 1 milliGRAM(s) Oral daily  glucagon  Injectable 1 milliGRAM(s) IntraMuscular once  insulin glargine Injectable (LANTUS) 8 Unit(s) SubCutaneous at bedtime  insulin lispro (ADMELOG) corrective regimen sliding scale   SubCutaneous three times a day before meals  insulin lispro (ADMELOG) corrective regimen sliding scale   SubCutaneous at bedtime  insulin lispro Injectable (ADMELOG) 3 Unit(s) SubCutaneous three times a day before meals  levothyroxine 50 MICROGram(s) Oral daily  memantine 5 milliGRAM(s) Oral at bedtime  metoprolol tartrate 50 milliGRAM(s) Oral two times a day  midodrine 10 milliGRAM(s) Oral <User Schedule>  Nephro-celeste 1 Tablet(s) Oral daily  polyethylene glycol 3350 17 Gram(s) Oral two times a day  QUEtiapine 12.5 milliGRAM(s) Oral three times a day  senna Syrup 10 milliLiter(s) Oral at bedtime  trihexyphenidyl 2 milliGRAM(s) Oral three times a day      Physical Exam  General: Patient comfortable in bed  Vital Signs Last 12 Hrs  T(F): 98.4 (12-10-21 @ 11:10), Max: 98.4 (12-10-21 @ 11:10)  HR: 85 (12-10-21 @ 11:10) (85 - 88)  BP: 122/71 (12-10-21 @ 11:10) (118/74 - 122/71)  BP(mean): --  RR: 18 (12-10-21 @ 11:10) (18 - 18)  SpO2: 95% (12-10-21 @ 11:10) (95% - 95%)  Neck: No palpable thyroid nodules.  CVS: S1S2, No murmurs  Respiratory: No wheezing, no crepitations  GI: Abdomen soft, bowel sounds positive  Musculoskeletal:  edema lower extremities.   Skin: No skin rashes, no ecchymosis    Diagnostics    Free Thyroxine, Serum: AM Sched. Collection: 08-Dec-2021 06:00 (12-07 @ 14:26)

## 2021-12-10 NOTE — PROGRESS NOTE ADULT - PROBLEM SELECTOR PLAN 2
Will continue synthroid 50 mcg po daily. Will continue monitoring TFTs and FU.  Suggest to FU outpatient in 4-6 weeks to repeat TFTs.  Discussed plan with patient.

## 2021-12-11 NOTE — PROGRESS NOTE ADULT - ASSESSMENT
72yo M w/ PMHx of CAD (s/p CABG 2019), CKD (unknown stage), DM2, Parkinson's Disease, HTN, depression presents with new onset acute heart failure exacerbation, NSTEMI, started on hep gtt, developed bl RP bleed, acute anemia. sp MICU course for hemorrhagic shock, 10/28 sp IR embolization l4 and l5 lumbar artery. sp permacath and AVF.    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 Simons    DNR/DNI  family not ready to stop dialysis    dc planning, pending [facility] placement with HD

## 2021-12-11 NOTE — PROGRESS NOTE ADULT - SUBJECTIVE AND OBJECTIVE BOX
Chief complaint    Patient is a 71y old  Male who presents with a chief complaint of leg swelling (11 Dec 2021 14:37)   Review of systems  Patient in bed, appears comfortable.    Labs and Fingersticks  CAPILLARY BLOOD GLUCOSE      POCT Blood Glucose.: 128 mg/dL (11 Dec 2021 16:52)  POCT Blood Glucose.: 102 mg/dL (11 Dec 2021 12:06)  POCT Blood Glucose.: 145 mg/dL (11 Dec 2021 08:17)  POCT Blood Glucose.: 107 mg/dL (10 Dec 2021 21:47)      Anion Gap, Serum: 15 (12-11 @ 05:17)      Calcium, Total Serum: 9.6 (12-11 @ 05:17)          12-11    132<L>  |  94<L>  |  67<H>  ----------------------------<  131<H>  4.4   |  23  |  6.07<H>    Ca    9.6      11 Dec 2021 05:17                          8.1    7.32  )-----------( 243      ( 11 Dec 2021 05:17 )             27.3     Medications  MEDICATIONS  (STANDING):  atorvastatin 80 milliGRAM(s) Oral at bedtime  carbidopa/levodopa  25/100 2.5 Tablet(s) Oral <User Schedule>  carbidopa/levodopa  25/100 2 Tablet(s) Oral <User Schedule>  chlorhexidine 4% Liquid 1 Application(s) Topical <User Schedule>  cholecalciferol 1000 Unit(s) Oral daily  dextrose 40% Gel 15 Gram(s) Oral once  dextrose 5%. 1000 milliLiter(s) (50 mL/Hr) IV Continuous <Continuous>  dextrose 5%. 1000 milliLiter(s) (100 mL/Hr) IV Continuous <Continuous>  dextrose 50% Injectable 25 Gram(s) IV Push once  dextrose 50% Injectable 12.5 Gram(s) IV Push once  dextrose 50% Injectable 25 Gram(s) IV Push once  diVALproex Sprinkle 250 milliGRAM(s) Oral two times a day  DULoxetine 20 milliGRAM(s) Oral daily  folic acid 1 milliGRAM(s) Oral daily  glucagon  Injectable 1 milliGRAM(s) IntraMuscular once  insulin glargine Injectable (LANTUS) 8 Unit(s) SubCutaneous at bedtime  insulin lispro (ADMELOG) corrective regimen sliding scale   SubCutaneous three times a day before meals  insulin lispro (ADMELOG) corrective regimen sliding scale   SubCutaneous at bedtime  insulin lispro Injectable (ADMELOG) 3 Unit(s) SubCutaneous three times a day before meals  levothyroxine 50 MICROGram(s) Oral daily  memantine 5 milliGRAM(s) Oral at bedtime  metoprolol tartrate 50 milliGRAM(s) Oral two times a day  midodrine 10 milliGRAM(s) Oral <User Schedule>  Nephro-celeste 1 Tablet(s) Oral daily  polyethylene glycol 3350 17 Gram(s) Oral two times a day  QUEtiapine 12.5 milliGRAM(s) Oral three times a day  senna Syrup 10 milliLiter(s) Oral at bedtime  trihexyphenidyl 2 milliGRAM(s) Oral three times a day      Physical Exam  General: Patient comfortable in bed  Vital Signs Last 12 Hrs  T(F): 97.3 (12-11-21 @ 16:45), Max: 98 (12-11-21 @ 11:20)  HR: 100 (12-11-21 @ 18:00) (73 - 100)  BP: 127/77 (12-11-21 @ 18:00) (107/65 - 129/69)  BP(mean): --  RR: 18 (12-11-21 @ 16:45) (17 - 18)  SpO2: 96% (12-11-21 @ 16:45) (95% - 96%)  Neck: No palpable thyroid nodules.  CVS: S1S2, No murmurs  Respiratory: No wheezing, no crepitations  GI: Abdomen soft, bowel sounds positive  Musculoskeletal:  edema lower extremities.     Diagnostics

## 2021-12-11 NOTE — PROGRESS NOTE ADULT - SUBJECTIVE AND OBJECTIVE BOX
Patient is a 71y old  Male who presents with a chief complaint of leg swelling (10 Dec 2021 12:36)      SUBJECTIVE / OVERNIGHT EVENTS:    Patient seen and examined. no cp sob. calm.      Vital Signs Last 24 Hrs  T(C): 36.2 (11 Dec 2021 13:25), Max: 36.7 (10 Dec 2021 21:23)  T(F): 97.2 (11 Dec 2021 13:25), Max: 98.1 (10 Dec 2021 21:23)  HR: 100 (11 Dec 2021 13:25) (73 - 100)  BP: 112/69 (11 Dec 2021 13:25) (112/69 - 129/69)  BP(mean): --  RR: 17 (11 Dec 2021 13:25) (17 - 18)  SpO2: 95% (11 Dec 2021 13:25) (95% - 96%)  I&O's Summary    10 Dec 2021 07:01  -  11 Dec 2021 07:00  --------------------------------------------------------  IN: 400 mL / OUT: 100 mL / NET: 300 mL    11 Dec 2021 07:01  -  11 Dec 2021 14:19  --------------------------------------------------------  IN: 120 mL / OUT: 0 mL / NET: 120 mL        PE:  GENERAL: NAD, AAOx1  HEAD:  Atraumatic, Normocephalic  CHEST/LUNG: CTABL, No wheeze, right neck permacath  HEART: Regular rate and rhythm; No murmurs, rubs, or gallops  ABDOMEN: Soft, Nontender, Nondistended; Bowel sounds present  EXTREMITIES:  2+ Peripheral Pulses, No clubbing, cyanosis, or edema, left avf   SKIN: No rashes or lesions, chronic venous stasis    LABS:                        8.1    7.32  )-----------( 243      ( 11 Dec 2021 05:17 )             27.3     12-11    132<L>  |  94<L>  |  67<H>  ----------------------------<  131<H>  4.4   |  23  |  6.07<H>    Ca    9.6      11 Dec 2021 05:17        CAPILLARY BLOOD GLUCOSE      POCT Blood Glucose.: 102 mg/dL (11 Dec 2021 12:06)  POCT Blood Glucose.: 145 mg/dL (11 Dec 2021 08:17)  POCT Blood Glucose.: 107 mg/dL (10 Dec 2021 21:47)  POCT Blood Glucose.: 118 mg/dL (10 Dec 2021 17:23)            RADIOLOGY & ADDITIONAL TESTS:    Imaging Personally Reviewed:  [x] YES  [ ] NO    Consultant(s) Notes Reviewed:  [x] YES  [ ] NO    MEDICATIONS  (STANDING):  atorvastatin 80 milliGRAM(s) Oral at bedtime  carbidopa/levodopa  25/100 2.5 Tablet(s) Oral <User Schedule>  carbidopa/levodopa  25/100 2 Tablet(s) Oral <User Schedule>  chlorhexidine 4% Liquid 1 Application(s) Topical <User Schedule>  cholecalciferol 1000 Unit(s) Oral daily  dextrose 40% Gel 15 Gram(s) Oral once  dextrose 5%. 1000 milliLiter(s) (50 mL/Hr) IV Continuous <Continuous>  dextrose 5%. 1000 milliLiter(s) (100 mL/Hr) IV Continuous <Continuous>  dextrose 50% Injectable 25 Gram(s) IV Push once  dextrose 50% Injectable 12.5 Gram(s) IV Push once  dextrose 50% Injectable 25 Gram(s) IV Push once  diVALproex Sprinkle 250 milliGRAM(s) Oral two times a day  DULoxetine 20 milliGRAM(s) Oral daily  epoetin mari-epbx (RETACRIT) Injectable 56522 Unit(s) IV Push once  folic acid 1 milliGRAM(s) Oral daily  glucagon  Injectable 1 milliGRAM(s) IntraMuscular once  insulin glargine Injectable (LANTUS) 8 Unit(s) SubCutaneous at bedtime  insulin lispro (ADMELOG) corrective regimen sliding scale   SubCutaneous three times a day before meals  insulin lispro (ADMELOG) corrective regimen sliding scale   SubCutaneous at bedtime  insulin lispro Injectable (ADMELOG) 3 Unit(s) SubCutaneous three times a day before meals  levothyroxine 50 MICROGram(s) Oral daily  memantine 5 milliGRAM(s) Oral at bedtime  metoprolol tartrate 50 milliGRAM(s) Oral two times a day  midodrine 10 milliGRAM(s) Oral <User Schedule>  Nephro-celeste 1 Tablet(s) Oral daily  polyethylene glycol 3350 17 Gram(s) Oral two times a day  QUEtiapine 12.5 milliGRAM(s) Oral three times a day  senna Syrup 10 milliLiter(s) Oral at bedtime  trihexyphenidyl 2 milliGRAM(s) Oral three times a day    MEDICATIONS  (PRN):  acetaminophen     Tablet .. 650 milliGRAM(s) Oral every 6 hours PRN Mild Pain (1 - 3)  albuterol/ipratropium for Nebulization 3 milliLiter(s) Nebulizer every 6 hours PRN Shortness of Breath and/or Wheezing  diVALproex Sprinkle 125 milliGRAM(s) Oral every 8 hours PRN Agitation  guaiFENesin Oral Liquid (Sugar-Free) 200 milliGRAM(s) Oral every 6 hours PRN Cough  QUEtiapine 12.5 milliGRAM(s) Oral every 6 hours PRN agitation  sodium chloride 0.9% lock flush 10 milliLiter(s) IV Push every 1 hour PRN Pre/post blood products, medications, blood draw, and to maintain line patency      Care Discussed with Consultants/Other Providers [x] YES  [ ] NO    HEALTH ISSUES - PROBLEM Dx:  Acute heart failure    Atrial flutter    DM2 (diabetes mellitus, type 2)    CAD (coronary artery disease)    Parkinsons disease    Acute on chronic renal failure    NSTEMI (non-ST elevation myocardial infarction)    Hypertension    Acute kidney injury superimposed on CKD    Anemia    Hypothyroidism    Delirium    Advanced care planning/counseling discussion    Palliative care status    Palliative care encounter    Pain    Stage 5 chronic kidney disease not on chronic dialysis

## 2021-12-11 NOTE — PROGRESS NOTE ADULT - ASSESSMENT
70yo M w/ PMHx of CAD (s/p CABG 2019), CKD (unknown stage), DM2, Parkinson's Disease, HTN, depression presents with bilateral leg swelling  ESRD   Severe LV dysfunction; A flutter   Confusion -       1 Palliation - Intermittent confusion   2 Renal-  Today HD  with epo   3 CVS- BP controlled at present, on Midodrine with parameters, Lopressor for heart rate control   4 Anemia -  Retacrit 10k units with  HD   5 Vasc - s/p  LUE AVF     6.  DNR/DNI  7. dc planning to [facility] placement with HD 72yo M w/ PMHx of CAD (s/p CABG 2019), CKD (unknown stage), DM2, Parkinson's Disease, HTN, depression presents with bilateral leg swelling  ESRD   Severe LV dysfunction; A flutter   Confusion -       1 Palliation - Intermittent confusion   2 Renal-  completed HD  with epo today  3 CVS- BP controlled at present, on Midodrine with parameters, Lopressor for heart rate control   4 Anemia -  Retacrit 10k units with  HD   5 Vasc - s/p  LUE AVF     6.  DNR/DNI  7. dc planning to [facility] placement with HD

## 2021-12-11 NOTE — PROGRESS NOTE ADULT - ASSESSMENT
Assessment  DMT2: 71y Male with DM T2 with hyperglycemia, A1C 6.4%, was on insulin at home, now on low-dose basal bolus insulin, blood sugars improving, no new hypoglycemias, eating meals.  Hypothyroidism: Patient has no history thyroid disease, was not on any thyroid supplements, subclinical with low-normal FT4, lethargic, started on synthroid 25 mcg po daily, repeat FT4 trending down, increased to synthroid 50 mcg po daily.  CHF: on medications, stable, monitored.  HTN: Controlled,  on antihypertensive medications.  Parkinsons: on meds, monitored.  CKD: Monitor labs/BMP      Kelsie Reece MD  Cell: [phone]  Office: [phone]   lacerations

## 2021-12-11 NOTE — PROGRESS NOTE ADULT - SUBJECTIVE AND OBJECTIVE BOX
Patient seen and examined in bed. no chest pain, no SOB,  NAD noted  Today HD as ordered      MEDICATIONS  (STANDING):  atorvastatin 80 milliGRAM(s) Oral at bedtime  carbidopa/levodopa  25/100 2.5 Tablet(s) Oral <User Schedule>  carbidopa/levodopa  25/100 2 Tablet(s) Oral <User Schedule>  chlorhexidine 4% Liquid 1 Application(s) Topical <User Schedule>  cholecalciferol 1000 Unit(s) Oral daily  dextrose 40% Gel 15 Gram(s) Oral once  dextrose 5%. 1000 milliLiter(s) (50 mL/Hr) IV Continuous <Continuous>  dextrose 5%. 1000 milliLiter(s) (100 mL/Hr) IV Continuous <Continuous>  dextrose 50% Injectable 25 Gram(s) IV Push once  dextrose 50% Injectable 12.5 Gram(s) IV Push once  dextrose 50% Injectable 25 Gram(s) IV Push once  diVALproex Sprinkle 250 milliGRAM(s) Oral two times a day  DULoxetine 20 milliGRAM(s) Oral daily  epoetin mari-epbx (RETACRIT) Injectable 76269 Unit(s) IV Push once  folic acid 1 milliGRAM(s) Oral daily  glucagon  Injectable 1 milliGRAM(s) IntraMuscular once  insulin glargine Injectable (LANTUS) 8 Unit(s) SubCutaneous at bedtime  insulin lispro (ADMELOG) corrective regimen sliding scale   SubCutaneous three times a day before meals  insulin lispro (ADMELOG) corrective regimen sliding scale   SubCutaneous at bedtime  insulin lispro Injectable (ADMELOG) 3 Unit(s) SubCutaneous three times a day before meals  levothyroxine 50 MICROGram(s) Oral daily  memantine 5 milliGRAM(s) Oral at bedtime  metoprolol tartrate 50 milliGRAM(s) Oral two times a day  midodrine 10 milliGRAM(s) Oral <User Schedule>  Nephro-celeste 1 Tablet(s) Oral daily  polyethylene glycol 3350 17 Gram(s) Oral two times a day  QUEtiapine 12.5 milliGRAM(s) Oral three times a day  senna Syrup 10 milliLiter(s) Oral at bedtime  trihexyphenidyl 2 milliGRAM(s) Oral three times a day      VITAL:  T(C): , Max: 36.7 (12-10-21 @ 21:23)  T(F): , Max: 98.1 (12-10-21 @ 21:23)  HR: 100 (12-11-21 @ 13:25)  BP: 112/69 (12-11-21 @ 13:25)  BP(mean): --  RR: 17 (12-11-21 @ 13:25)  SpO2: 95% (12-11-21 @ 13:25)  Wt(kg): --    I and O's:    12-10 @ 07:01  -  12-11 @ 07:00  --------------------------------------------------------  IN: 400 mL / OUT: 100 mL / NET: 300 mL    12-11 @ 07:01  -  12-11 @ 14:37  --------------------------------------------------------  IN: 120 mL / OUT: 0 mL / NET: 120 mL          PHYSICAL EXAM:    Constitutional: NAD  Neck:  No JVD  Respiratory: CTAB/L  Cardiovascular: S1 and S2  Gastrointestinal: BS+, soft, NT/ND  Extremities: No peripheral edema  Neurological:  no focal deficits  Psychiatric: Normal mood, normal affect  : No Javier  Access: perm cath and Lt AVF     LABS:                        8.1    7.32  )-----------( 243      ( 11 Dec 2021 05:17 )             27.3     12-11    132<L>  |  94<L>  |  67<H>  ----------------------------<  131<H>  4.4   |  23  |  6.07<H>    Ca    9.6      11 Dec 2021 05:17         Patient seen and examined.  resting  in bed after HD. no chest pain, no SOB,  NAD noted  Today HD completed 1L removed      MEDICATIONS  (STANDING):  atorvastatin 80 milliGRAM(s) Oral at bedtime  carbidopa/levodopa  25/100 2.5 Tablet(s) Oral <User Schedule>  carbidopa/levodopa  25/100 2 Tablet(s) Oral <User Schedule>  chlorhexidine 4% Liquid 1 Application(s) Topical <User Schedule>  cholecalciferol 1000 Unit(s) Oral daily  dextrose 40% Gel 15 Gram(s) Oral once  dextrose 5%. 1000 milliLiter(s) (50 mL/Hr) IV Continuous <Continuous>  dextrose 5%. 1000 milliLiter(s) (100 mL/Hr) IV Continuous <Continuous>  dextrose 50% Injectable 25 Gram(s) IV Push once  dextrose 50% Injectable 12.5 Gram(s) IV Push once  dextrose 50% Injectable 25 Gram(s) IV Push once  diVALproex Sprinkle 250 milliGRAM(s) Oral two times a day  DULoxetine 20 milliGRAM(s) Oral daily  epoetin mari-epbx (RETACRIT) Injectable 11810 Unit(s) IV Push once  folic acid 1 milliGRAM(s) Oral daily  glucagon  Injectable 1 milliGRAM(s) IntraMuscular once  insulin glargine Injectable (LANTUS) 8 Unit(s) SubCutaneous at bedtime  insulin lispro (ADMELOG) corrective regimen sliding scale   SubCutaneous three times a day before meals  insulin lispro (ADMELOG) corrective regimen sliding scale   SubCutaneous at bedtime  insulin lispro Injectable (ADMELOG) 3 Unit(s) SubCutaneous three times a day before meals  levothyroxine 50 MICROGram(s) Oral daily  memantine 5 milliGRAM(s) Oral at bedtime  metoprolol tartrate 50 milliGRAM(s) Oral two times a day  midodrine 10 milliGRAM(s) Oral <User Schedule>  Nephro-celeste 1 Tablet(s) Oral daily  polyethylene glycol 3350 17 Gram(s) Oral two times a day  QUEtiapine 12.5 milliGRAM(s) Oral three times a day  senna Syrup 10 milliLiter(s) Oral at bedtime  trihexyphenidyl 2 milliGRAM(s) Oral three times a day      VITAL:  T(C): , Max: 36.7 (12-10-21 @ 21:23)  T(F): , Max: 98.1 (12-10-21 @ 21:23)  HR: 100 (12-11-21 @ 13:25)  BP: 112/69 (12-11-21 @ 13:25)  BP(mean): --  RR: 17 (12-11-21 @ 13:25)  SpO2: 95% (12-11-21 @ 13:25)  Wt(kg): --    I and O's:    12-10 @ 07:01  -  12-11 @ 07:00  --------------------------------------------------------  IN: 400 mL / OUT: 100 mL / NET: 300 mL    12-11 @ 07:01  -  12-11 @ 14:37  --------------------------------------------------------  IN: 120 mL / OUT: 0 mL / NET: 120 mL          PHYSICAL EXAM:    Constitutional: NAD  Neck:  No JVD  Respiratory: CTAB/L  Cardiovascular: S1 and S2  Gastrointestinal: BS+, soft, NT/ND  Extremities: No peripheral edema  Neurological:  no focal deficits  Psychiatric: Normal mood, normal affect  : No Javier  Access: perm cath and Lt AVF     LABS:                        8.1    7.32  )-----------( 243      ( 11 Dec 2021 05:17 )             27.3     12-11    132<L>  |  94<L>  |  67<H>  ----------------------------<  131<H>  4.4   |  23  |  6.07<H>    Ca    9.6      11 Dec 2021 05:17

## 2021-12-12 NOTE — PROGRESS NOTE ADULT - SUBJECTIVE AND OBJECTIVE BOX
Patient is a 71y old  Male who presents with a chief complaint of leg swelling (11 Dec 2021 20:44)      SUBJECTIVE / OVERNIGHT EVENTS:    Patient seen and examined. no acute events.      Vital Signs Last 24 Hrs  T(C): 37.3 (12 Dec 2021 11:50), Max: 37.3 (12 Dec 2021 11:50)  T(F): 99.1 (12 Dec 2021 11:50), Max: 99.1 (12 Dec 2021 11:50)  HR: 95 (12 Dec 2021 11:50) (95 - 103)  BP: 131/73 (12 Dec 2021 11:50) (107/65 - 131/73)  BP(mean): --  RR: 17 (12 Dec 2021 11:50) (17 - 18)  SpO2: 93% (12 Dec 2021 11:50) (93% - 96%)  I&O's Summary    11 Dec 2021 07:01  -  12 Dec 2021 07:00  --------------------------------------------------------  IN: 240 mL / OUT: 1000 mL / NET: -760 mL        PE:  GENERAL: NAD, AAOx1  HEAD:  Atraumatic, Normocephalic  CHEST/LUNG: CTABL, No wheeze, right neck permacath  HEART: Regular rate and rhythm; No murmurs, rubs, or gallops  ABDOMEN: Soft, Nontender, Nondistended; Bowel sounds present  EXTREMITIES:  2+ Peripheral Pulses, No clubbing, cyanosis, or edema, left avf   SKIN: No rashes or lesions, chronic venous stasis    LABS:                        8.1    7.32  )-----------( 243      ( 11 Dec 2021 05:17 )             27.3     12-11    132<L>  |  94<L>  |  67<H>  ----------------------------<  131<H>  4.4   |  23  |  6.07<H>    Ca    9.6      11 Dec 2021 05:17        CAPILLARY BLOOD GLUCOSE      POCT Blood Glucose.: 89 mg/dL (12 Dec 2021 11:12)  POCT Blood Glucose.: 122 mg/dL (12 Dec 2021 07:53)  POCT Blood Glucose.: 174 mg/dL (11 Dec 2021 21:23)  POCT Blood Glucose.: 128 mg/dL (11 Dec 2021 16:52)            RADIOLOGY & ADDITIONAL TESTS:    Imaging Personally Reviewed:  [x] YES  [ ] NO    Consultant(s) Notes Reviewed:  [x] YES  [ ] NO    MEDICATIONS  (STANDING):  atorvastatin 80 milliGRAM(s) Oral at bedtime  carbidopa/levodopa  25/100 2.5 Tablet(s) Oral <User Schedule>  carbidopa/levodopa  25/100 2 Tablet(s) Oral <User Schedule>  chlorhexidine 4% Liquid 1 Application(s) Topical <User Schedule>  cholecalciferol 1000 Unit(s) Oral daily  dextrose 40% Gel 15 Gram(s) Oral once  dextrose 5%. 1000 milliLiter(s) (50 mL/Hr) IV Continuous <Continuous>  dextrose 5%. 1000 milliLiter(s) (100 mL/Hr) IV Continuous <Continuous>  dextrose 50% Injectable 25 Gram(s) IV Push once  dextrose 50% Injectable 12.5 Gram(s) IV Push once  dextrose 50% Injectable 25 Gram(s) IV Push once  diVALproex Sprinkle 250 milliGRAM(s) Oral two times a day  DULoxetine 20 milliGRAM(s) Oral daily  folic acid 1 milliGRAM(s) Oral daily  glucagon  Injectable 1 milliGRAM(s) IntraMuscular once  insulin glargine Injectable (LANTUS) 8 Unit(s) SubCutaneous at bedtime  insulin lispro (ADMELOG) corrective regimen sliding scale   SubCutaneous three times a day before meals  insulin lispro (ADMELOG) corrective regimen sliding scale   SubCutaneous at bedtime  insulin lispro Injectable (ADMELOG) 3 Unit(s) SubCutaneous three times a day before meals  levothyroxine 50 MICROGram(s) Oral daily  memantine 5 milliGRAM(s) Oral at bedtime  metoprolol tartrate 50 milliGRAM(s) Oral two times a day  midodrine 10 milliGRAM(s) Oral <User Schedule>  Nephro-celeste 1 Tablet(s) Oral daily  polyethylene glycol 3350 17 Gram(s) Oral two times a day  QUEtiapine 12.5 milliGRAM(s) Oral three times a day  senna Syrup 10 milliLiter(s) Oral at bedtime  trihexyphenidyl 2 milliGRAM(s) Oral three times a day    MEDICATIONS  (PRN):  acetaminophen     Tablet .. 650 milliGRAM(s) Oral every 6 hours PRN Mild Pain (1 - 3)  albuterol/ipratropium for Nebulization 3 milliLiter(s) Nebulizer every 6 hours PRN Shortness of Breath and/or Wheezing  diVALproex Sprinkle 125 milliGRAM(s) Oral every 8 hours PRN Agitation  guaiFENesin Oral Liquid (Sugar-Free) 200 milliGRAM(s) Oral every 6 hours PRN Cough  QUEtiapine 12.5 milliGRAM(s) Oral every 6 hours PRN agitation  sodium chloride 0.9% lock flush 10 milliLiter(s) IV Push every 1 hour PRN Pre/post blood products, medications, blood draw, and to maintain line patency      Care Discussed with Consultants/Other Providers [x] YES  [ ] NO    HEALTH ISSUES - PROBLEM Dx:  Acute heart failure    Atrial flutter    DM2 (diabetes mellitus, type 2)    CAD (coronary artery disease)    Parkinsons disease    Acute on chronic renal failure    NSTEMI (non-ST elevation myocardial infarction)    Hypertension    Acute kidney injury superimposed on CKD    Anemia    Hypothyroidism    Delirium    Advanced care planning/counseling discussion    Palliative care status    Palliative care encounter    Pain    Stage 5 chronic kidney disease not on chronic dialysis

## 2021-12-12 NOTE — PROGRESS NOTE ADULT - ASSESSMENT
Assessment  DMT2: 71y Male with DM T2 with hyperglycemia, A1C 6.4%, was on insulin at home, now on low-dose basal bolus insulin, blood sugars in overall acceptable range, no new hypoglycemias, eating meals, appears comfortable, pending placement to [facility] with HD.  Hypothyroidism: Patient has no history thyroid disease, was not on any thyroid supplements, subclinical with low-normal FT4, lethargic, started on synthroid 25 mcg po daily, repeat FT4 trending down, increased to synthroid 50 mcg po daily.  CHF: on medications, stable, monitored.  HTN: Controlled,  on antihypertensive medications.  Parkinsons: on meds, monitored.  CKD: Monitor labs/BMP      Kelsie Reece MD  Cell: [phone]  Office: [phone]     Assessment  DMT2: 71y Male with DM T2 with hyperglycemia, A1C 6.4%, was on insulin at home, now on low-dose basal bolus insulin, blood sugars in overall acceptable range, no new hypoglycemias, eating meals, appears comfortable,  pending placement to [facility] with HD.  Hypothyroidism: Patient has no history thyroid disease, was not on any thyroid supplements, subclinical with low-normal FT4, lethargic, started on synthroid 25 mcg po daily, repeat FT4 trending down, increased to synthroid 50 mcg po daily.  CHF: on medications, stable, monitored.  HTN: Controlled,  on antihypertensive medications.  Parkinsons: on meds, monitored.  CKD: Monitor labs/BMP      Kelsie Reece MD  Cell: [phone]  Office: [phone]

## 2021-12-12 NOTE — PROGRESS NOTE ADULT - SUBJECTIVE AND OBJECTIVE BOX
Chief complaint  Patient is a 71y old  Male who presents with a chief complaint of leg swelling (12 Dec 2021 14:06)   Review of systems  Patient in bed, looks comfortable, no hypoglycemic episodes.    Labs and Fingersticks  CAPILLARY BLOOD GLUCOSE      POCT Blood Glucose.: 89 mg/dL (12 Dec 2021 11:12)  POCT Blood Glucose.: 122 mg/dL (12 Dec 2021 07:53)  POCT Blood Glucose.: 174 mg/dL (11 Dec 2021 21:23)  POCT Blood Glucose.: 128 mg/dL (11 Dec 2021 16:52)      Anion Gap, Serum: 15 (12-11 @ 05:17)      Calcium, Total Serum: 9.6 (12-11 @ 05:17)          12-11    132<L>  |  94<L>  |  67<H>  ----------------------------<  131<H>  4.4   |  23  |  6.07<H>    Ca    9.6      11 Dec 2021 05:17                          8.1    7.32  )-----------( 243      ( 11 Dec 2021 05:17 )             27.3     Medications  MEDICATIONS  (STANDING):  atorvastatin 80 milliGRAM(s) Oral at bedtime  carbidopa/levodopa  25/100 2.5 Tablet(s) Oral <User Schedule>  carbidopa/levodopa  25/100 2 Tablet(s) Oral <User Schedule>  chlorhexidine 4% Liquid 1 Application(s) Topical <User Schedule>  cholecalciferol 1000 Unit(s) Oral daily  dextrose 40% Gel 15 Gram(s) Oral once  dextrose 5%. 1000 milliLiter(s) (50 mL/Hr) IV Continuous <Continuous>  dextrose 5%. 1000 milliLiter(s) (100 mL/Hr) IV Continuous <Continuous>  dextrose 50% Injectable 25 Gram(s) IV Push once  dextrose 50% Injectable 12.5 Gram(s) IV Push once  dextrose 50% Injectable 25 Gram(s) IV Push once  diVALproex Sprinkle 250 milliGRAM(s) Oral two times a day  DULoxetine 20 milliGRAM(s) Oral daily  folic acid 1 milliGRAM(s) Oral daily  glucagon  Injectable 1 milliGRAM(s) IntraMuscular once  insulin glargine Injectable (LANTUS) 8 Unit(s) SubCutaneous at bedtime  insulin lispro (ADMELOG) corrective regimen sliding scale   SubCutaneous three times a day before meals  insulin lispro (ADMELOG) corrective regimen sliding scale   SubCutaneous at bedtime  insulin lispro Injectable (ADMELOG) 3 Unit(s) SubCutaneous three times a day before meals  levothyroxine 50 MICROGram(s) Oral daily  memantine 5 milliGRAM(s) Oral at bedtime  metoprolol tartrate 50 milliGRAM(s) Oral two times a day  midodrine 10 milliGRAM(s) Oral <User Schedule>  Nephro-celeste 1 Tablet(s) Oral daily  polyethylene glycol 3350 17 Gram(s) Oral two times a day  QUEtiapine 12.5 milliGRAM(s) Oral three times a day  senna Syrup 10 milliLiter(s) Oral at bedtime  trihexyphenidyl 2 milliGRAM(s) Oral three times a day      Physical Exam  General: Patient comfortable in bed  Vital Signs Last 12 Hrs  T(F): 99.1 (12-12-21 @ 11:50), Max: 99.1 (12-12-21 @ 11:50)  HR: 95 (12-12-21 @ 11:50) (95 - 95)  BP: 131/73 (12-12-21 @ 11:50) (126/74 - 131/73)  BP(mean): --  RR: 17 (12-12-21 @ 11:50) (17 - 18)  SpO2: 93% (12-12-21 @ 11:50) (93% - 95%)  Neck: No palpable thyroid nodules.  CVS: S1S2, No murmurs  Respiratory: No wheezing, no crepitations  GI: Abdomen soft, bowel sounds positive  Musculoskeletal:  edema lower extremities.   Skin: No skin rashes, no ecchymosis    Diagnostics    Free Thyroxine, Serum: AM Sched. Collection: 08-Dec-2021 06:00 (12-07 @ 14:26)           Chief complaint  Patient is a 71y old  Male who presents with a chief complaint of leg swelling (12 Dec 2021 14:06)   Review of systems  Patient in bed, looks comfortable, no hypoglycemic episodes.    Labs and Fingersticks  CAPILLARY BLOOD GLUCOSE      POCT Blood Glucose.: 89 mg/dL (12 Dec 2021 11:12)  POCT Blood Glucose.: 122 mg/dL (12 Dec 2021 07:53)  POCT Blood Glucose.: 174 mg/dL (11 Dec 2021 21:23)  POCT Blood Glucose.: 128 mg/dL (11 Dec 2021 16:52)      Anion Gap, Serum: 15 (12-11 @ 05:17)      Calcium, Total Serum: 9.6 (12-11 @ 05:17)          12-11    132<L>  |  94<L>  |  67<H>  ----------------------------<  131<H>  4.4   |  23  |  6.07<H>    Ca    9.6      11 Dec 2021 05:17                          8.1    7.32  )-----------( 243      ( 11 Dec 2021 05:17 )             27.3     Medications  MEDICATIONS  (STANDING):  atorvastatin 80 milliGRAM(s) Oral at bedtime  carbidopa/levodopa  25/100 2.5 Tablet(s) Oral <User Schedule>  carbidopa/levodopa  25/100 2 Tablet(s) Oral <User Schedule>  chlorhexidine 4% Liquid 1 Application(s) Topical <User Schedule>  cholecalciferol 1000 Unit(s) Oral daily  dextrose 40% Gel 15 Gram(s) Oral once  dextrose 5%. 1000 milliLiter(s) (50 mL/Hr) IV Continuous <Continuous>  dextrose 5%. 1000 milliLiter(s) (100 mL/Hr) IV Continuous <Continuous>  dextrose 50% Injectable 25 Gram(s) IV Push once  dextrose 50% Injectable 12.5 Gram(s) IV Push once  dextrose 50% Injectable 25 Gram(s) IV Push once  diVALproex Sprinkle 250 milliGRAM(s) Oral two times a day  DULoxetine 20 milliGRAM(s) Oral daily  folic acid 1 milliGRAM(s) Oral daily  glucagon  Injectable 1 milliGRAM(s) IntraMuscular once  insulin glargine Injectable (LANTUS) 8 Unit(s) SubCutaneous at bedtime  insulin lispro (ADMELOG) corrective regimen sliding scale   SubCutaneous three times a day before meals  insulin lispro (ADMELOG) corrective regimen sliding scale   SubCutaneous at bedtime  insulin lispro Injectable (ADMELOG) 3 Unit(s) SubCutaneous three times a day before meals  levothyroxine 50 MICROGram(s) Oral daily  memantine 5 milliGRAM(s) Oral at bedtime  metoprolol tartrate 50 milliGRAM(s) Oral two times a day  midodrine 10 milliGRAM(s) Oral <User Schedule>  Nephro-celeste 1 Tablet(s) Oral daily  polyethylene glycol 3350 17 Gram(s) Oral two times a day  QUEtiapine 12.5 milliGRAM(s) Oral three times a day  senna Syrup 10 milliLiter(s) Oral at bedtime  trihexyphenidyl 2 milliGRAM(s) Oral three times a day      Physical Exam  General: Patient comfortable in bed  Vital Signs Last 12 Hrs  T(F): 99.1 (12-12-21 @ 11:50), Max: 99.1 (12-12-21 @ 11:50)  HR: 95 (12-12-21 @ 11:50) (95 - 95)  BP: 131/73 (12-12-21 @ 11:50) (126/74 - 131/73)  BP(mean): --  RR: 17 (12-12-21 @ 11:50) (17 - 18)  SpO2: 93% (12-12-21 @ 11:50) (93% - 95%)  Neck: No palpable thyroid nodules.  CVS: S1S2, No murmurs  Respiratory: No wheezing, no crepitations  GI: Abdomen soft, bowel sounds positive  Musculoskeletal:  edema lower extremities.   Skin: No skin rashes, no ecchymosis    Diagnostics    Free Thyroxine, Serum: AM Sched. Collection: 08-Dec-2021 06:00 (12-07 @ 14:26)

## 2021-12-13 NOTE — PROGRESS NOTE ADULT - SUBJECTIVE AND OBJECTIVE BOX
Patient is a 71y old  Male who presents with a chief complaint of leg swelling (13 Dec 2021 14:27)      SUBJECTIVE / OVERNIGHT EVENTS:    Patient seen and examined earlier in the day. no complaints. calm.  informed later, patient pulled out permacath.      Vital Signs Last 24 Hrs  T(C): 36.7 (13 Dec 2021 10:05), Max: 36.7 (12 Dec 2021 20:12)  T(F): 98.1 (13 Dec 2021 10:05), Max: 98.1 (12 Dec 2021 20:12)  HR: 105 (13 Dec 2021 10:05) (66 - 105)  BP: 108/64 (13 Dec 2021 14:32) (100/65 - 135/76)  BP(mean): --  RR: 18 (13 Dec 2021 10:05) (18 - 18)  SpO2: 94% (13 Dec 2021 10:05) (93% - 99%)  I&O's Summary    12 Dec 2021 07:01  -  13 Dec 2021 07:00  --------------------------------------------------------  IN: 300 mL / OUT: 0 mL / NET: 300 mL    13 Dec 2021 07:01  -  13 Dec 2021 14:49  --------------------------------------------------------  IN: 450 mL / OUT: 0 mL / NET: 450 mL        PE:  GENERAL: NAD, AAOx1  HEAD:  Atraumatic, Normocephalic  CHEST/LUNG: CTABL, No wheeze, right neck permacath  HEART: Regular rate and rhythm; No murmurs, rubs, or gallops  ABDOMEN: Soft, Nontender, Nondistended; Bowel sounds present  EXTREMITIES:  2+ Peripheral Pulses, No clubbing, cyanosis, or edema, left avf   SKIN: No rashes or lesions, chronic venous stasis    LABS:                        9.1    8.51  )-----------( 244      ( 13 Dec 2021 05:20 )             30.2     12-13    133<L>  |  96  |  57<H>  ----------------------------<  91  4.3   |  24  |  5.44<H>    Ca    10.4      13 Dec 2021 05:20  Phos  5.9     12-13  Mg     2.2     12-13        CAPILLARY BLOOD GLUCOSE      POCT Blood Glucose.: 175 mg/dL (13 Dec 2021 11:58)  POCT Blood Glucose.: 155 mg/dL (13 Dec 2021 07:44)  POCT Blood Glucose.: 117 mg/dL (12 Dec 2021 21:20)  POCT Blood Glucose.: 82 mg/dL (12 Dec 2021 17:04)            RADIOLOGY & ADDITIONAL TESTS:    Imaging Personally Reviewed:  [x] YES  [ ] NO    Consultant(s) Notes Reviewed:  [x] YES  [ ] NO    MEDICATIONS  (STANDING):  atorvastatin 80 milliGRAM(s) Oral at bedtime  carbidopa/levodopa  25/100 2.5 Tablet(s) Oral <User Schedule>  carbidopa/levodopa  25/100 2 Tablet(s) Oral <User Schedule>  chlorhexidine 4% Liquid 1 Application(s) Topical <User Schedule>  cholecalciferol 1000 Unit(s) Oral daily  dextrose 40% Gel 15 Gram(s) Oral once  dextrose 5%. 1000 milliLiter(s) (50 mL/Hr) IV Continuous <Continuous>  dextrose 5%. 1000 milliLiter(s) (100 mL/Hr) IV Continuous <Continuous>  dextrose 50% Injectable 25 Gram(s) IV Push once  dextrose 50% Injectable 12.5 Gram(s) IV Push once  dextrose 50% Injectable 25 Gram(s) IV Push once  diVALproex Sprinkle 250 milliGRAM(s) Oral two times a day  DULoxetine 20 milliGRAM(s) Oral daily  folic acid 1 milliGRAM(s) Oral daily  glucagon  Injectable 1 milliGRAM(s) IntraMuscular once  insulin glargine Injectable (LANTUS) 8 Unit(s) SubCutaneous at bedtime  insulin lispro (ADMELOG) corrective regimen sliding scale   SubCutaneous three times a day before meals  insulin lispro (ADMELOG) corrective regimen sliding scale   SubCutaneous at bedtime  insulin lispro Injectable (ADMELOG) 3 Unit(s) SubCutaneous three times a day before meals  levothyroxine 50 MICROGram(s) Oral daily  memantine 5 milliGRAM(s) Oral at bedtime  metoprolol tartrate 50 milliGRAM(s) Oral two times a day  midodrine 10 milliGRAM(s) Oral <User Schedule>  Nephro-celeste 1 Tablet(s) Oral daily  polyethylene glycol 3350 17 Gram(s) Oral two times a day  QUEtiapine 12.5 milliGRAM(s) Oral three times a day  senna Syrup 10 milliLiter(s) Oral at bedtime  trihexyphenidyl 2 milliGRAM(s) Oral three times a day    MEDICATIONS  (PRN):  acetaminophen     Tablet .. 650 milliGRAM(s) Oral every 6 hours PRN Mild Pain (1 - 3)  albuterol/ipratropium for Nebulization 3 milliLiter(s) Nebulizer every 6 hours PRN Shortness of Breath and/or Wheezing  diVALproex Sprinkle 125 milliGRAM(s) Oral every 8 hours PRN Agitation  guaiFENesin Oral Liquid (Sugar-Free) 200 milliGRAM(s) Oral every 6 hours PRN Cough  QUEtiapine 12.5 milliGRAM(s) Oral every 6 hours PRN agitation  sodium chloride 0.9% lock flush 10 milliLiter(s) IV Push every 1 hour PRN Pre/post blood products, medications, blood draw, and to maintain line patency      Care Discussed with Consultants/Other Providers [x] YES  [ ] NO    HEALTH ISSUES - PROBLEM Dx:  Acute heart failure    Atrial flutter    DM2 (diabetes mellitus, type 2)    CAD (coronary artery disease)    Parkinsons disease    Acute on chronic renal failure    NSTEMI (non-ST elevation myocardial infarction)    Hypertension    Acute kidney injury superimposed on CKD    Anemia    Hypothyroidism    Delirium    Advanced care planning/counseling discussion    Palliative care status    Palliative care encounter    Pain    Stage 5 chronic kidney disease not on chronic dialysis

## 2021-12-13 NOTE — PROGRESS NOTE ADULT - ASSESSMENT
70yo M w/ PMHx of CAD (s/p CABG 2019), CKD (unknown stage), DM2, Parkinson's Disease, HTN, depression presents with new onset acute heart failure exacerbation, NSTEMI, started on hep gtt, developed bl RP bleed, acute anemia. sp MICU course for hemorrhagic shock, 10/28 sp IR embolization l4 and l5 lumbar artery. sp permacath and AVF.    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 Simons    DNR/DNI  family not ready to stop dialysis    dc planning, pending [facility] placement with HD    addendum: pt pulled out permacath. IR consult to replace for HD.  consider vascular fu to see if AVF mature?

## 2021-12-13 NOTE — CONSULT NOTE ADULT - ASSESSMENT
Assessment/Plan: 71 year old Male with a PMH of CAD (s/p CABG 2019), CKD (unknown stage), DM2, Parkinson's Disease, HTN, and depression. Patient is pending D/C placement. Patient pulled out tunneled HD catheter on 12/12. IR consulted for new tunneled HD catheter placement.    -Please place IR procedure order for Tunneled HD catheter. Place order under Dr. Turpin. Plan to place catheter tomorrow 12/14/2021.  -Make patient NPO after midnight  -Please send AM labs including coags  -Please send COVID swab   -HOLD all anticoagulation prior to procedure.

## 2021-12-13 NOTE — CONSULT NOTE ADULT - SUBJECTIVE AND OBJECTIVE BOX
Interventional Radiology    Evaluate for Procedure: Tunneled HD catheter     HPI: 71 year old Male with a PMH of CAD (s/p CABG 2019), CKD (unknown stage), DM2, Parkinson's Disease, HTN, and depression. Patient is pending D/C placement. Patient pulled out tunneled HD catheter on 12/12. IR consulted for new tunneled HD catheter placement.    Allergies: adhesives (Rash)  latex (Urticaria)    Medications (Abx/Cardiac/Anticoagulation/Blood Products)    metoprolol tartrate: 50 milliGRAM(s) Oral (12-13 @ 05:59)    Data:    T(C): 36.7  HR: 105  BP: 100/65  RR: 18  SpO2: 94%    -WBC 8.51 / HgB 9.1 / Hct 30.2 / Plt 244  -Na 133 / Cl 96 / BUN 57 / Glucose 91  -K 4.3 / CO2 24 / Cr 5.44  -ALT -- / Alk Phos -- / T.Bili --  -INR -- / PTT 55.5

## 2021-12-13 NOTE — PROGRESS NOTE ADULT - SUBJECTIVE AND OBJECTIVE BOX
Chief complaint  Patient is a 71y old  Male who presents with a chief complaint of leg swelling (13 Dec 2021 10:27)   Review of systems  Patient in bed, looks comfortable, no hypoglycemic episodes.    Labs and Fingersticks  CAPILLARY BLOOD GLUCOSE      POCT Blood Glucose.: 175 mg/dL (13 Dec 2021 11:58)  POCT Blood Glucose.: 155 mg/dL (13 Dec 2021 07:44)  POCT Blood Glucose.: 117 mg/dL (12 Dec 2021 21:20)  POCT Blood Glucose.: 82 mg/dL (12 Dec 2021 17:04)      Anion Gap, Serum: 13 (12-13 @ 05:20)      Calcium, Total Serum: 10.4 (12-13 @ 05:20)          12-13    133<L>  |  96  |  57<H>  ----------------------------<  91  4.3   |  24  |  5.44<H>    Ca    10.4      13 Dec 2021 05:20  Phos  5.9     12-13  Mg     2.2     12-13                          9.1    8.51  )-----------( 244      ( 13 Dec 2021 05:20 )             30.2     Medications  MEDICATIONS  (STANDING):  atorvastatin 80 milliGRAM(s) Oral at bedtime  carbidopa/levodopa  25/100 2.5 Tablet(s) Oral <User Schedule>  carbidopa/levodopa  25/100 2 Tablet(s) Oral <User Schedule>  chlorhexidine 4% Liquid 1 Application(s) Topical <User Schedule>  cholecalciferol 1000 Unit(s) Oral daily  dextrose 40% Gel 15 Gram(s) Oral once  dextrose 5%. 1000 milliLiter(s) (50 mL/Hr) IV Continuous <Continuous>  dextrose 5%. 1000 milliLiter(s) (100 mL/Hr) IV Continuous <Continuous>  dextrose 50% Injectable 25 Gram(s) IV Push once  dextrose 50% Injectable 12.5 Gram(s) IV Push once  dextrose 50% Injectable 25 Gram(s) IV Push once  diVALproex Sprinkle 250 milliGRAM(s) Oral two times a day  DULoxetine 20 milliGRAM(s) Oral daily  folic acid 1 milliGRAM(s) Oral daily  glucagon  Injectable 1 milliGRAM(s) IntraMuscular once  insulin glargine Injectable (LANTUS) 8 Unit(s) SubCutaneous at bedtime  insulin lispro (ADMELOG) corrective regimen sliding scale   SubCutaneous three times a day before meals  insulin lispro (ADMELOG) corrective regimen sliding scale   SubCutaneous at bedtime  insulin lispro Injectable (ADMELOG) 3 Unit(s) SubCutaneous three times a day before meals  levothyroxine 50 MICROGram(s) Oral daily  memantine 5 milliGRAM(s) Oral at bedtime  metoprolol tartrate 50 milliGRAM(s) Oral two times a day  midodrine 10 milliGRAM(s) Oral <User Schedule>  Nephro-celeste 1 Tablet(s) Oral daily  polyethylene glycol 3350 17 Gram(s) Oral two times a day  QUEtiapine 12.5 milliGRAM(s) Oral three times a day  senna Syrup 10 milliLiter(s) Oral at bedtime  trihexyphenidyl 2 milliGRAM(s) Oral three times a day      Physical Exam  General: Patient comfortable in bed  Vital Signs Last 12 Hrs  T(F): 98.1 (12-13-21 @ 10:05), Max: 98.1 (12-13-21 @ 10:05)  HR: 105 (12-13-21 @ 10:05) (99 - 105)  BP: 100/65 (12-13-21 @ 10:05) (100/65 - 124/84)  BP(mean): --  RR: 18 (12-13-21 @ 10:05) (18 - 18)  SpO2: 94% (12-13-21 @ 10:05) (93% - 94%)  Neck: No palpable thyroid nodules.  CVS: S1S2, No murmurs  Respiratory: No wheezing, no crepitations  GI: Abdomen soft, bowel sounds positive  Musculoskeletal:  edema lower extremities.   Skin: No skin rashes, no ecchymosis    Diagnostics    Free Thyroxine, Serum: AM Sched. Collection: 08-Dec-2021 06:00 (12-07 @ 14:26)           Chief complaint  Patient is a 71y old  Male who presents with a chief complaint of leg swelling (13 Dec 2021 10:27)   Review of systems  Patient in bed, looks comfortable, no hypoglycemic episodes.    Labs and Fingersticks  CAPILLARY BLOOD GLUCOSE      POCT Blood Glucose.: 175 mg/dL (13 Dec 2021 11:58)  POCT Blood Glucose.: 155 mg/dL (13 Dec 2021 07:44)  POCT Blood Glucose.: 117 mg/dL (12 Dec 2021 21:20)  POCT Blood Glucose.: 82 mg/dL (12 Dec 2021 17:04)      Anion Gap, Serum: 13 (12-13 @ 05:20)      Calcium, Total Serum: 10.4 (12-13 @ 05:20)          12-13    133<L>  |  96  |  57<H>  ----------------------------<  91  4.3   |  24  |  5.44<H>    Ca    10.4      13 Dec 2021 05:20  Phos  5.9     12-13  Mg     2.2     12-13                          9.1    8.51  )-----------( 244      ( 13 Dec 2021 05:20 )             30.2     Medications  MEDICATIONS  (STANDING):  atorvastatin 80 milliGRAM(s) Oral at bedtime  carbidopa/levodopa  25/100 2.5 Tablet(s) Oral <User Schedule>  carbidopa/levodopa  25/100 2 Tablet(s) Oral <User Schedule>  chlorhexidine 4% Liquid 1 Application(s) Topical <User Schedule>  cholecalciferol 1000 Unit(s) Oral daily  dextrose 40% Gel 15 Gram(s) Oral once  dextrose 5%. 1000 milliLiter(s) (50 mL/Hr) IV Continuous <Continuous>  dextrose 5%. 1000 milliLiter(s) (100 mL/Hr) IV Continuous <Continuous>  dextrose 50% Injectable 25 Gram(s) IV Push once  dextrose 50% Injectable 12.5 Gram(s) IV Push once  dextrose 50% Injectable 25 Gram(s) IV Push once  diVALproex Sprinkle 250 milliGRAM(s) Oral two times a day  DULoxetine 20 milliGRAM(s) Oral daily  folic acid 1 milliGRAM(s) Oral daily  glucagon  Injectable 1 milliGRAM(s) IntraMuscular once  insulin glargine Injectable (LANTUS) 8 Unit(s) SubCutaneous at bedtime  insulin lispro (ADMELOG) corrective regimen sliding scale   SubCutaneous three times a day before meals  insulin lispro (ADMELOG) corrective regimen sliding scale   SubCutaneous at bedtime  insulin lispro Injectable (ADMELOG) 3 Unit(s) SubCutaneous three times a day before meals  levothyroxine 50 MICROGram(s) Oral daily  memantine 5 milliGRAM(s) Oral at bedtime  metoprolol tartrate 50 milliGRAM(s) Oral two times a day  midodrine 10 milliGRAM(s) Oral <User Schedule>  Nephro-celeste 1 Tablet(s) Oral daily  polyethylene glycol 3350 17 Gram(s) Oral two times a day  QUEtiapine 12.5 milliGRAM(s) Oral three times a day  senna Syrup 10 milliLiter(s) Oral at bedtime  trihexyphenidyl 2 milliGRAM(s) Oral three times a day      Physical Exam  General: Patient comfortable in bed  Vital Signs Last 12 Hrs  T(F): 98.1 (12-13-21 @ 10:05), Max: 98.1 (12-13-21 @ 10:05)  HR: 105 (12-13-21 @ 10:05) (99 - 105)  BP: 100/65 (12-13-21 @ 10:05) (100/65 - 124/84)  BP(mean): --  RR: 18 (12-13-21 @ 10:05) (18 - 18)  SpO2: 94% (12-13-21 @ 10:05) (93% - 94%)  Neck: No palpable thyroid nodules.  CVS: S1S2, No murmurs  Respiratory: No wheezing, no crepitations  GI: Abdomen soft, bowel sounds positive  Musculoskeletal:  edema lower extremities.   Skin: No skin rashes, no ecchymosis    Diagnostics    Free Thyroxine, Serum: AM Sched. Collection: 08-Dec-2021 06:00 (12-07 @ 14:26)

## 2021-12-13 NOTE — PROGRESS NOTE ADULT - SUBJECTIVE AND OBJECTIVE BOX
[facility] [phone]        Patient seen and examined in bed.  He was the same         MEDICATIONS  (STANDING):  atorvastatin 80 milliGRAM(s) Oral at bedtime  carbidopa/levodopa  25/100 2.5 Tablet(s) Oral <User Schedule>  carbidopa/levodopa  25/100 2 Tablet(s) Oral <User Schedule>  chlorhexidine 4% Liquid 1 Application(s) Topical <User Schedule>  cholecalciferol 1000 Unit(s) Oral daily  dextrose 40% Gel 15 Gram(s) Oral once  dextrose 5%. 1000 milliLiter(s) (50 mL/Hr) IV Continuous <Continuous>  dextrose 5%. 1000 milliLiter(s) (100 mL/Hr) IV Continuous <Continuous>  dextrose 50% Injectable 25 Gram(s) IV Push once  dextrose 50% Injectable 12.5 Gram(s) IV Push once  dextrose 50% Injectable 25 Gram(s) IV Push once  diVALproex Sprinkle 250 milliGRAM(s) Oral two times a day  DULoxetine 20 milliGRAM(s) Oral daily  folic acid 1 milliGRAM(s) Oral daily  glucagon  Injectable 1 milliGRAM(s) IntraMuscular once  insulin glargine Injectable (LANTUS) 8 Unit(s) SubCutaneous at bedtime  insulin lispro (ADMELOG) corrective regimen sliding scale   SubCutaneous three times a day before meals  insulin lispro (ADMELOG) corrective regimen sliding scale   SubCutaneous at bedtime  insulin lispro Injectable (ADMELOG) 3 Unit(s) SubCutaneous three times a day before meals  levothyroxine 50 MICROGram(s) Oral daily  memantine 5 milliGRAM(s) Oral at bedtime  metoprolol tartrate 50 milliGRAM(s) Oral two times a day  midodrine 10 milliGRAM(s) Oral <User Schedule>  Nephro-celeste 1 Tablet(s) Oral daily  polyethylene glycol 3350 17 Gram(s) Oral two times a day  QUEtiapine 12.5 milliGRAM(s) Oral three times a day  senna Syrup 10 milliLiter(s) Oral at bedtime  trihexyphenidyl 2 milliGRAM(s) Oral three times a day      VITAL:  T(C): , Max: 37.3 (12-12-21 @ 11:50)  T(F): , Max: 99.1 (12-12-21 @ 11:50)  HR: 105 (12-13-21 @ 10:05)  BP: 100/65 (12-13-21 @ 10:05)  BP(mean): --  RR: 18 (12-13-21 @ 10:05)  SpO2: 94% (12-13-21 @ 10:05)  Wt(kg): --    I and O's:    12-12 @ 07:01  -  12-13 @ 07:00  --------------------------------------------------------  IN: 300 mL / OUT: 0 mL / NET: 300 mL          PHYSICAL EXAM:    Constitutional: NAD  Neck:  No JVD  Respiratory: poor effort   Cardiovascular: S1 and S2  Gastrointestinal: BS+, soft, NT/ND  Extremities: No peripheral edema  Neurological:   no focal deficits  Psychiatric: Normal mood, normal affect  : No Javier  Skin: No rashes  Access: avf and perm cath     LABS:                        9.1    8.51  )-----------( 244      ( 13 Dec 2021 05:20 )             30.2     12-13    133<L>  |  96  |  57<H>  ----------------------------<  91  4.3   |  24  |  5.44<H>    Ca    10.4      13 Dec 2021 05:20  Phos  5.9     12-13  Mg     2.2     12-13            Urine Studies:          RADIOLOGY & ADDITIONAL STUDIES:

## 2021-12-13 NOTE — PROGRESS NOTE ADULT - PROBLEM SELECTOR PLAN 1
Please hold standing-dose Admelog if patient is not eating.  Will continue current insulin regimen for now. Will continue monitoring FS and FU.  Patient previously on large-dose basal bolus insulin. Suggest DC on current inpatient insulin regimen, endo FU 4 weeks.   for compliance with consistent low carb diet. Please hold standing-dose Admelog if patient is not eating.  Will continue current insulin regimen for now. Will continue monitoring  FS and FU.  Patient previously on large-dose basal bolus insulin. Suggest DC on current inpatient insulin regimen, endo FU 4 weeks.   for compliance with consistent low carb diet.

## 2021-12-13 NOTE — PROGRESS NOTE ADULT - SUBJECTIVE AND OBJECTIVE BOX
[facility]      Seen earlier today, and examined.  - Today, patient is without complaints.           *****MEDICATIONS: Current medication reviewed and documented.    MEDICATIONS  (STANDING):  atorvastatin 80 milliGRAM(s) Oral at bedtime  carbidopa/levodopa  25/100 2.5 Tablet(s) Oral <User Schedule>  carbidopa/levodopa  25/100 2 Tablet(s) Oral <User Schedule>  chlorhexidine 4% Liquid 1 Application(s) Topical <User Schedule>  cholecalciferol 1000 Unit(s) Oral daily  dextrose 40% Gel 15 Gram(s) Oral once  dextrose 5%. 1000 milliLiter(s) (50 mL/Hr) IV Continuous <Continuous>  dextrose 5%. 1000 milliLiter(s) (100 mL/Hr) IV Continuous <Continuous>  dextrose 50% Injectable 25 Gram(s) IV Push once  dextrose 50% Injectable 12.5 Gram(s) IV Push once  dextrose 50% Injectable 25 Gram(s) IV Push once  diVALproex Sprinkle 250 milliGRAM(s) Oral two times a day  DULoxetine 20 milliGRAM(s) Oral daily  folic acid 1 milliGRAM(s) Oral daily  glucagon  Injectable 1 milliGRAM(s) IntraMuscular once  insulin glargine Injectable (LANTUS) 8 Unit(s) SubCutaneous at bedtime  insulin lispro (ADMELOG) corrective regimen sliding scale   SubCutaneous three times a day before meals  insulin lispro (ADMELOG) corrective regimen sliding scale   SubCutaneous at bedtime  insulin lispro Injectable (ADMELOG) 3 Unit(s) SubCutaneous three times a day before meals  levothyroxine 50 MICROGram(s) Oral daily  memantine 5 milliGRAM(s) Oral at bedtime  metoprolol tartrate 50 milliGRAM(s) Oral two times a day  midodrine 10 milliGRAM(s) Oral <User Schedule>  Nephro-celeste 1 Tablet(s) Oral daily  polyethylene glycol 3350 17 Gram(s) Oral two times a day  QUEtiapine 12.5 milliGRAM(s) Oral three times a day  senna Syrup 10 milliLiter(s) Oral at bedtime  trihexyphenidyl 2 milliGRAM(s) Oral three times a day    MEDICATIONS  (PRN):  acetaminophen     Tablet .. 650 milliGRAM(s) Oral every 6 hours PRN Mild Pain (1 - 3)  albuterol/ipratropium for Nebulization 3 milliLiter(s) Nebulizer every 6 hours PRN Shortness of Breath and/or Wheezing  diVALproex Sprinkle 125 milliGRAM(s) Oral every 8 hours PRN Agitation  guaiFENesin Oral Liquid (Sugar-Free) 200 milliGRAM(s) Oral every 6 hours PRN Cough  QUEtiapine 12.5 milliGRAM(s) Oral every 6 hours PRN agitation  sodium chloride 0.9% lock flush 10 milliLiter(s) IV Push every 1 hour PRN Pre/post blood products, medications, blood draw, and to maintain line patency          ***** VITAL SIGNS:  T(F): 98.1 (21 @ 10:05), Max: 98.1 (21 @ 20:12)  HR: 105 (21 @ 10:05) (66 - 105)  BP: 108/64 (21 @ 14:32) (100/65 - 124/84)  RR: 18 (21 @ 10:05) (18 - 18)  SpO2: 94% (21 @ 10:05) (93% - 99%)  Wt(kg): --  ,   I&O's Summary    12 Dec 2021 07:  -  13 Dec 2021 07:00  --------------------------------------------------------  IN: 300 mL / OUT: 0 mL / NET: 300 mL    13 Dec 2021 07:  -  13 Dec 2021 18:37  --------------------------------------------------------  IN: 450 mL / OUT: 0 mL / NET: 450 mL             *****PHYSICAL EXAM: sleeping, arouses to much coercion.      alert oriented x2 attention comprehension are poor.      EOmi fundi not visualized   no nystagmus VFF to confrontation  Tongue is midline  Palate elevates symmetrically   Moving all 4 ext spontaneously no drift appreciated    Gait not assessed.          *****LAB AND IMAGIN.1    8.51  )-----------( 244      ( 13 Dec 2021 05:20 )             30.2               12-13    133<L>  |  96  |  57<H>  ----------------------------<  91  4.3   |  24  |  5.44<H>    Ca    10.4      13 Dec 2021 05:20  Phos  5.9     12-13  Mg     2.2     12-13                           [All pertinent recent Imaging/Reports reviewed]           *****A S S E S S M E N T   A N D   P L A N :    72yo M w/ PMHx of CAD (s/p CABG ), CKD (unknown stage), DM2, Parkinson's Disease, HTN, depression presents with bilateral leg swelling, patient's daughter in-law at bedside Elsy Quintero ([address] [place] [address] Nurse) provides the history, the daughter reports the patient is a poor historian, unable to remember having conversations with others and likely cannot weigh risk/benefits of medical conditions, she reports that he has had bilateral lower extremity swelling for the past few months, but over the past 2 weeks it has significantly worsened, he has further difficulty ambulating due to swelling, his outpatient provider attempted to manage with 20mg lasix and then increased to 40mg lasix but the patient never took the increased dose, his symptoms ar persistent throughout the day and are extremely severe, he has developed wounds of the legs with weeping of fluid due to the swelling, she reports that the patient is frequently non-compliant with medications and medical management, he lives at home with his son and daughter in law, he denies chest pain, shortness of breath, fever/chills, cough, sputum, in the ED, he was tachycardic but hemodynamically stable, afebrile, saturating well on room air, labs were significant for elevated BNP, elevated creatinine, imaging showed moderate left pleural effusion, patient was admitted to general medicine for further management          Problem/Recommendations 1:ams of unclear etiology   likely related to multiple metabolic derangements related to uremia  sleep wake cycle disruption and poor nutritional intake  would regulate sleep wake cycle  check b12/b1 folate/tsh /ammonia wnl   thiamine 500 iv q 8 x 2 days after checking vitamin b1 level   nutrition consult for severe protein caloric malnutrition   discussed with elsy( daughter in law), agreeing to start namenda 5 mg qhs probable early lewy body dementia related paranoia and agitation   plan likely needs to adjust dose of antipsychotics, however avoid typical antipsychotics  elsy denies any alcohol intake at all.     lowered seroquel 12.5 due to sedation    over night pt did not sleep   now fell asleep as per aide at 7.15   regulate sleep wake cycle   dc am dose of seroquel   increase night time dose to regulate sleep wake cycle        more awake   conversant.   still somewhat agitated  does follow simple commands   pt able to arouse, converse    more awake, following commands   still with paranoia      on cymbalta       Problem/Recommendations 2: weakness   r/o orthostatic hypotension   pt shaking on standing up    prob deconditioning   pt eval   oob to chair as tolerated       Thank you for allowing me to participate in the care of this patient. Will continue to follow patient periodically. Please do not hesitate to call me if you have any  questions or if there has been a change in patients neurological status     ________________  Lily Fang MD  [facility] ([facility]  [phone]      33 minutes spent on total encounter; more than 50 % of the visit was  spent counseling about plan of care, compliance to diet/exercise and medication regimen and or  coordinating care by the attending physician.      It is advised that stroke patients follow up with NP Kelsey Albarran @ [phone] in 1- 2 weeks.   Others please follow up with Dr. Michael Nissenbaum [phone]

## 2021-12-13 NOTE — PROGRESS NOTE ADULT - ASSESSMENT
70yo M w/ PMHx of CAD (s/p CABG 2019), CKD (unknown stage), DM2, Parkinson's Disease, HTN, depression presents with bilateral leg swelling  ESRD   Severe LV dysfunction; A flutter   Confusion -       1 Palliation - Intermittent confusion but much better today   2 Renal-   HD  with epo in am   3 CVS- BP controlled at present, on Midodrine with parameters, Lopressor for heart rate control   4 Anemia -  Retacrit 10k units with  HD   5 Vasc - s/p  LUE AVF     6.  DNR/DNI  7. dc planning [facility] placement with HD    [facility]   [facility]   [phone]

## 2021-12-13 NOTE — PROGRESS NOTE ADULT - ASSESSMENT
Assessment  DMT2: 71y Male with DM T2 with hyperglycemia, A1C 6.4%, was on insulin at home, now on low-dose basal bolus insulin, blood sugars fluctuating within overall acceptable range, no new hypoglycemias. Patient is eating meals, appears comfortable, planning DC to [facility] with HD, pending placement.  Hypothyroidism: Patient has no history thyroid disease, was not on any thyroid supplements, subclinical with low-normal FT4, lethargic, started on synthroid 25 mcg po daily, repeat FT4 trending down, increased to synthroid 50 mcg po daily.  CHF: on medications, stable, monitored.  HTN: Controlled,  on antihypertensive medications.  Parkinsons: on meds, monitored.  CKD: Monitor labs/BMP      Kelsie Reece MD  Cell: [phone]  Office: [phone]     Assessment  DMT2: 71y Male with DM T2 with hyperglycemia, A1C 6.4%, was on insulin at home, now on low-dose basal  bolus insulin, blood sugars fluctuating within overall acceptable range, no new hypoglycemias. Patient is eating meals, appears comfortable,  planning DC to [facility] with HD, pending placement.  Hypothyroidism: Patient has no history thyroid disease, was not on any thyroid supplements, subclinical with low-normal FT4, lethargic, started on synthroid 25 mcg po daily, repeat FT4 trending down, increased to synthroid 50 mcg po daily.  CHF: on medications, stable, monitored.  HTN: Controlled,  on antihypertensive medications.  Parkinsons: on meds, monitored.  CKD: Monitor labs/BMP      Kelsie Reece MD  Cell: [phone]  Office: [phone]

## 2021-12-14 NOTE — PROGRESS NOTE ADULT - ASSESSMENT
70yo M w/ PMHx of CAD (s/p CABG 2019), CKD (unknown stage), DM2, Parkinson's Disease, HTN, depression presents with new onset acute heart failure exacerbation, NSTEMI, started on hep gtt, developed bl RP bleed, acute anemia. sp MICU course for hemorrhagic shock, 10/28 sp IR embolization l4 and l5 lumbar artery. sp permacath and AVF.    # Acute systolic and diastolic heart failure  # NSTEMI, medical management  # ESRD, new HD  # Atrial flutter  # acute hypoxic resp failure  # hemorrhagic shock, left RP hematoma, acute blood loss anemia sp embolization l4 and l5 lumbar artery  # lupus anticoagulant +  TTE mod to severe LV SD, hypokinesis anterior wall  10/28 sp IR embolization l4 and l5 lumbar artery, h/h stable  HD via permacath per renal, midodrine during dialysis  sp L AVF   12/13 pt pulled out permacath  IR to replace today  please ask vascular fu to see if AVF mature?    # Parkinsons disease   # delirium  cont on depakote seroquel and cymbalta  c/w trihexyphenidyl, carbidopa/levodopa   per neuro, probable early lewy body dementia related paranoia and agitation  psych fu    # DM2 (diabetes mellitus, type 2)  per endo  hba1c 6.4    # CAD (coronary artery disease)  # HTN  lopressor 50mg bid  c/w atorvastatin  asa on hold    PCP Dr. Simons    DNR/DNI    dw son Branden over the phone today  family still not ready to stop dialysis  vascular and psych fu    Dr. Pineda will take over care tomorrow.  Please contact with any questions or concerns [phone].

## 2021-12-14 NOTE — PROGRESS NOTE ADULT - ASSESSMENT
72yo M w/ PMHx of CAD (s/p CABG 2019), CKD (unknown stage), DM2, Parkinson's Disease, HTN, depression presents with bilateral leg swelling  ESRD   Severe LV dysfunction; A flutter   Confusion -       1 Palliation - Intermittent confusion   2 Renal-   HD  with epo in am   3 CVS- BP controlled at present, on Midodrine with parameters, Lopressor for heart rate control   4 Anemia -  Retacrit 10k units with  HD   5 Vasc - s/p  LUE AVF ;  Perm cath today         [facility]   [facility]   [phone]

## 2021-12-14 NOTE — PROGRESS NOTE ADULT - ASSESSMENT
Assessment  DMT2: 71y Male with DM T2 with hyperglycemia, A1C 6.4%, was on insulin at home, now on low-dose basal bolus insulin, bolus dose being held 2/2 NPO status, blood sugars fluctuating within overall acceptable range, no new hypoglycemias, NPO for HD catheter reinsertion today.  Hypothyroidism: Patient has no history thyroid disease, was not on any thyroid supplements, subclinical with low-normal FT4, lethargic, started on synthroid 25 mcg po daily, repeat FT4 trending down, increased to synthroid 50 mcg po daily.  CHF: on medications, stable, monitored.  HTN: Controlled,  on antihypertensive medications.  Parkinsons: on meds, monitored.  CKD: Monitor labs/BMP      Kelsie Reece MD  Cell: [phone]  Office: [phone]     Assessment  DMT2: 71y Male with DM T2 with hyperglycemia, A1C 6.4%, was on insulin at home, now on low-dose basal bolus insulin, bolus dose being held 2/2 NPO status, blood sugars fluctuating within overall acceptable range, no new hypoglycemias,  NPO for HD catheter reinsertion today.  Hypothyroidism: Patient has no history thyroid disease, was not on any thyroid supplements, subclinical with low-normal FT4, lethargic, started on synthroid 25 mcg po daily, repeat FT4 trending down, increased to synthroid 50 mcg po daily.  CHF: on medications, stable, monitored.  HTN: Controlled,  on antihypertensive medications.  Parkinsons: on meds, monitored.  CKD: Monitor labs/BMP      Kelsie Reece MD  Cell: [phone]  Office: [phone]

## 2021-12-14 NOTE — PROGRESS NOTE ADULT - SUBJECTIVE AND OBJECTIVE BOX
NEPH[facility]OLOGY[facility] [phone]        Patient seen and examined in bed.  He pulled out his perm cath and has no recollection of the above         MEDICATIONS  (STANDING):  atorvastatin 80 milliGRAM(s) Oral at bedtime  carbidopa/levodopa  25/100 2.5 Tablet(s) Oral <User Schedule>  carbidopa/levodopa  25/100 2 Tablet(s) Oral <User Schedule>  chlorhexidine 4% Liquid 1 Application(s) Topical <User Schedule>  cholecalciferol 1000 Unit(s) Oral daily  dextrose 40% Gel 15 Gram(s) Oral once  dextrose 5%. 1000 milliLiter(s) (50 mL/Hr) IV Continuous <Continuous>  dextrose 5%. 1000 milliLiter(s) (100 mL/Hr) IV Continuous <Continuous>  dextrose 50% Injectable 25 Gram(s) IV Push once  dextrose 50% Injectable 12.5 Gram(s) IV Push once  dextrose 50% Injectable 25 Gram(s) IV Push once  diVALproex Sprinkle 250 milliGRAM(s) Oral two times a day  DULoxetine 20 milliGRAM(s) Oral daily  epoetin mari-epbx (RETACRIT) Injectable 87033 Unit(s) IV Push once  folic acid 1 milliGRAM(s) Oral daily  glucagon  Injectable 1 milliGRAM(s) IntraMuscular once  insulin glargine Injectable (LANTUS) 8 Unit(s) SubCutaneous at bedtime  insulin lispro (ADMELOG) corrective regimen sliding scale   SubCutaneous three times a day before meals  insulin lispro (ADMELOG) corrective regimen sliding scale   SubCutaneous at bedtime  insulin lispro Injectable (ADMELOG) 3 Unit(s) SubCutaneous three times a day before meals  levothyroxine 50 MICROGram(s) Oral daily  memantine 5 milliGRAM(s) Oral at bedtime  metoprolol tartrate 50 milliGRAM(s) Oral two times a day  midodrine 10 milliGRAM(s) Oral <User Schedule>  Nephro-celeste 1 Tablet(s) Oral daily  polyethylene glycol 3350 17 Gram(s) Oral two times a day  QUEtiapine 12.5 milliGRAM(s) Oral three times a day  senna Syrup 10 milliLiter(s) Oral at bedtime  trihexyphenidyl 2 milliGRAM(s) Oral three times a day      VITAL:  T(C): , Max: 36.8 (12-14-21 @ 04:00)  T(F): , Max: 98.2 (12-14-21 @ 04:00)  HR: 95 (12-14-21 @ 04:00)  BP: 139/76 (12-14-21 @ 04:00)  BP(mean): --  RR: 18 (12-14-21 @ 04:00)  SpO2: 98% (12-14-21 @ 04:00)  Wt(kg): --    I and O's:    12-13 @ 07:01  -  12-14 @ 07:00  --------------------------------------------------------  IN: 450 mL / OUT: 0 mL / NET: 450 mL          PHYSICAL EXAM:    Constitutional: NAD  Neck:  No JVD  Respiratory: CTAB/L  Cardiovascular: S1 and S2  Gastrointestinal: BS+, soft, NT/ND  Extremities: No peripheral edema  Neurological:  no focal deficits  Psychiatric: Normal mood, normal affect  : No Javier  Skin: No rashes  Access:avf    LABS:                        9.1    8.65  )-----------( 263      ( 14 Dec 2021 05:41 )             30.3     12-14    134<L>  |  95<L>  |  71<H>  ----------------------------<  95  4.4   |  22  |  6.50<H>    Ca    10.1      14 Dec 2021 05:41  Phos  6.3     12-14  Mg     2.2     12-14            Urine Studies:          RADIOLOGY & ADDITIONAL STUDIES:

## 2021-12-14 NOTE — PROGRESS NOTE ADULT - SUBJECTIVE AND OBJECTIVE BOX
Patient is a 71y old  Male who presents with a chief complaint of leg swelling (14 Dec 2021 09:13)      SUBJECTIVE / OVERNIGHT EVENTS:    Patient seen and examined. calm. pulled out permacath yesterday. no bleeding noted.      Vital Signs Last 24 Hrs  T(C): 36.8 (14 Dec 2021 09:37), Max: 36.8 (14 Dec 2021 04:00)  T(F): 98.2 (14 Dec 2021 09:37), Max: 98.2 (14 Dec 2021 04:00)  HR: 95 (14 Dec 2021 09:37) (95 - 105)  BP: 139/76 (14 Dec 2021 09:37) (100/65 - 139/76)  BP(mean): --  RR: 18 (14 Dec 2021 09:37) (18 - 18)  SpO2: 98% (14 Dec 2021 09:37) (94% - 98%)  I&O's Summary    13 Dec 2021 07:01  -  14 Dec 2021 07:00  --------------------------------------------------------  IN: 450 mL / OUT: 0 mL / NET: 450 mL        PE:  GENERAL: NAD, AAOx1  HEAD:  Atraumatic, Normocephalic  CHEST/LUNG: CTABL, No wheeze, right chest CDI  HEART: Regular rate and rhythm; No murmurs, rubs, or gallops  ABDOMEN: Soft, Nontender, Nondistended; Bowel sounds present  EXTREMITIES:  2+ Peripheral Pulses, No clubbing, cyanosis, or edema, left avf   SKIN: No rashes or lesions, chronic venous stasis    LABS:                        9.1    8.65  )-----------( 263      ( 14 Dec 2021 05:41 )             30.3     12-14    134<L>  |  95<L>  |  71<H>  ----------------------------<  95  4.4   |  22  |  6.50<H>    Ca    10.1      14 Dec 2021 05:41  Phos  6.3     12-14  Mg     2.2     12-14      PT/INR - ( 14 Dec 2021 05:43 )   PT: 12.8 sec;   INR: 1.07 ratio         PTT - ( 14 Dec 2021 05:43 )  PTT:49.7 sec  CAPILLARY BLOOD GLUCOSE      POCT Blood Glucose.: 97 mg/dL (14 Dec 2021 07:35)  POCT Blood Glucose.: 208 mg/dL (13 Dec 2021 23:33)  POCT Blood Glucose.: 181 mg/dL (13 Dec 2021 21:35)  POCT Blood Glucose.: 124 mg/dL (13 Dec 2021 17:03)  POCT Blood Glucose.: 175 mg/dL (13 Dec 2021 11:58)            RADIOLOGY & ADDITIONAL TESTS:    Imaging Personally Reviewed:  [x] YES  [ ] NO    Consultant(s) Notes Reviewed:  [x] YES  [ ] NO    MEDICATIONS  (STANDING):  atorvastatin 80 milliGRAM(s) Oral at bedtime  carbidopa/levodopa  25/100 2.5 Tablet(s) Oral <User Schedule>  carbidopa/levodopa  25/100 2 Tablet(s) Oral <User Schedule>  chlorhexidine 4% Liquid 1 Application(s) Topical <User Schedule>  cholecalciferol 1000 Unit(s) Oral daily  dextrose 40% Gel 15 Gram(s) Oral once  dextrose 5%. 1000 milliLiter(s) (50 mL/Hr) IV Continuous <Continuous>  dextrose 5%. 1000 milliLiter(s) (100 mL/Hr) IV Continuous <Continuous>  dextrose 50% Injectable 25 Gram(s) IV Push once  dextrose 50% Injectable 12.5 Gram(s) IV Push once  dextrose 50% Injectable 25 Gram(s) IV Push once  diVALproex Sprinkle 250 milliGRAM(s) Oral two times a day  DULoxetine 20 milliGRAM(s) Oral daily  epoetin mari-epbx (RETACRIT) Injectable 57890 Unit(s) IV Push once  folic acid 1 milliGRAM(s) Oral daily  glucagon  Injectable 1 milliGRAM(s) IntraMuscular once  insulin glargine Injectable (LANTUS) 8 Unit(s) SubCutaneous at bedtime  insulin lispro (ADMELOG) corrective regimen sliding scale   SubCutaneous three times a day before meals  insulin lispro (ADMELOG) corrective regimen sliding scale   SubCutaneous at bedtime  insulin lispro Injectable (ADMELOG) 3 Unit(s) SubCutaneous three times a day before meals  levothyroxine 50 MICROGram(s) Oral daily  memantine 5 milliGRAM(s) Oral at bedtime  metoprolol tartrate 50 milliGRAM(s) Oral two times a day  midodrine 10 milliGRAM(s) Oral <User Schedule>  Nephro-celeste 1 Tablet(s) Oral daily  polyethylene glycol 3350 17 Gram(s) Oral two times a day  QUEtiapine 12.5 milliGRAM(s) Oral three times a day  senna Syrup 10 milliLiter(s) Oral at bedtime  trihexyphenidyl 2 milliGRAM(s) Oral three times a day    MEDICATIONS  (PRN):  acetaminophen     Tablet .. 650 milliGRAM(s) Oral every 6 hours PRN Mild Pain (1 - 3)  albuterol/ipratropium for Nebulization 3 milliLiter(s) Nebulizer every 6 hours PRN Shortness of Breath and/or Wheezing  diVALproex Sprinkle 125 milliGRAM(s) Oral every 8 hours PRN Agitation  guaiFENesin Oral Liquid (Sugar-Free) 200 milliGRAM(s) Oral every 6 hours PRN Cough  QUEtiapine 12.5 milliGRAM(s) Oral every 6 hours PRN agitation  sodium chloride 0.9% lock flush 10 milliLiter(s) IV Push every 1 hour PRN Pre/post blood products, medications, blood draw, and to maintain line patency      Care Discussed with Consultants/Other Providers [x] YES  [ ] NO    HEALTH ISSUES - PROBLEM Dx:  Acute heart failure    Atrial flutter    DM2 (diabetes mellitus, type 2)    CAD (coronary artery disease)    Parkinsons disease    Acute on chronic renal failure    NSTEMI (non-ST elevation myocardial infarction)    Hypertension    Acute kidney injury superimposed on CKD    Anemia    Hypothyroidism    Delirium    Advanced care planning/counseling discussion    Palliative care status    Palliative care encounter    Pain    Stage 5 chronic kidney disease not on chronic dialysis

## 2021-12-14 NOTE — PROGRESS NOTE ADULT - SUBJECTIVE AND OBJECTIVE BOX
Chief complaint  Patient is a 71y old  Male who presents with a chief complaint of leg swelling (14 Dec 2021 10:00)   Review of systems  Patient NPO, no hypoglycemic episodes.    Labs and Fingersticks  CAPILLARY BLOOD GLUCOSE      POCT Blood Glucose.: 94 mg/dL (14 Dec 2021 12:45)  POCT Blood Glucose.: 97 mg/dL (14 Dec 2021 07:35)  POCT Blood Glucose.: 208 mg/dL (13 Dec 2021 23:33)  POCT Blood Glucose.: 181 mg/dL (13 Dec 2021 21:35)  POCT Blood Glucose.: 124 mg/dL (13 Dec 2021 17:03)      Medications  MEDICATIONS  (STANDING):  atorvastatin 80 milliGRAM(s) Oral at bedtime  carbidopa/levodopa  25/100 2.5 Tablet(s) Oral <User Schedule>  carbidopa/levodopa  25/100 2 Tablet(s) Oral <User Schedule>  chlorhexidine 4% Liquid 1 Application(s) Topical <User Schedule>  cholecalciferol 1000 Unit(s) Oral daily  dextrose 40% Gel 15 Gram(s) Oral once  dextrose 5%. 1000 milliLiter(s) (50 mL/Hr) IV Continuous <Continuous>  dextrose 5%. 1000 milliLiter(s) (100 mL/Hr) IV Continuous <Continuous>  dextrose 50% Injectable 25 Gram(s) IV Push once  dextrose 50% Injectable 12.5 Gram(s) IV Push once  dextrose 50% Injectable 25 Gram(s) IV Push once  diVALproex Sprinkle 250 milliGRAM(s) Oral two times a day  DULoxetine 20 milliGRAM(s) Oral daily  epoetin mari-epbx (RETACRIT) Injectable 65338 Unit(s) IV Push once  folic acid 1 milliGRAM(s) Oral daily  glucagon  Injectable 1 milliGRAM(s) IntraMuscular once  insulin glargine Injectable (LANTUS) 8 Unit(s) SubCutaneous at bedtime  insulin lispro (ADMELOG) corrective regimen sliding scale   SubCutaneous three times a day before meals  insulin lispro (ADMELOG) corrective regimen sliding scale   SubCutaneous at bedtime  insulin lispro Injectable (ADMELOG) 3 Unit(s) SubCutaneous three times a day before meals  levothyroxine 50 MICROGram(s) Oral daily  memantine 5 milliGRAM(s) Oral at bedtime  metoprolol tartrate 50 milliGRAM(s) Oral two times a day  midodrine 10 milliGRAM(s) Oral <User Schedule>  Nephro-celeste 1 Tablet(s) Oral daily  polyethylene glycol 3350 17 Gram(s) Oral two times a day  QUEtiapine 25 milliGRAM(s) Oral three times a day  senna Syrup 10 milliLiter(s) Oral at bedtime  trihexyphenidyl 2 milliGRAM(s) Oral three times a day      Physical Exam  Vital Signs Last 12 Hrs  T(F): 97.9 (12-14-21 @ 12:50), Max: 98.2 (12-14-21 @ 04:00)  HR: 105 (12-14-21 @ 12:50) (95 - 108)  BP: 130/80 (12-14-21 @ 12:50) (103/58 - 139/76)  BP(mean): 86 (12-14-21 @ 12:30) (73 - 86)  RR: 18 (12-14-21 @ 12:50) (18 - 22)  SpO2: 93% (12-14-21 @ 12:50) (92% - 100%)    Diagnostics    Free Thyroxine, Serum: AM Sched. Collection: 08-Dec-2021 06:00 (12-07 @ 14:26)           Chief complaint  Patient is a 71y old  Male who presents with a chief complaint of leg swelling (14 Dec 2021 10:00)   Review of systems  Patient NPO, no hypoglycemic episodes.    Labs and Fingersticks  CAPILLARY BLOOD GLUCOSE      POCT Blood Glucose.: 94 mg/dL (14 Dec 2021 12:45)  POCT Blood Glucose.: 97 mg/dL (14 Dec 2021 07:35)  POCT Blood Glucose.: 208 mg/dL (13 Dec 2021 23:33)  POCT Blood Glucose.: 181 mg/dL (13 Dec 2021 21:35)  POCT Blood Glucose.: 124 mg/dL (13 Dec 2021 17:03)    Medications  MEDICATIONS  (STANDING):  atorvastatin 80 milliGRAM(s) Oral at bedtime  carbidopa/levodopa  25/100 2.5 Tablet(s) Oral <User Schedule>  carbidopa/levodopa  25/100 2 Tablet(s) Oral <User Schedule>  chlorhexidine 4% Liquid 1 Application(s) Topical <User Schedule>  cholecalciferol 1000 Unit(s) Oral daily  dextrose 40% Gel 15 Gram(s) Oral once  dextrose 5%. 1000 milliLiter(s) (50 mL/Hr) IV Continuous <Continuous>  dextrose 5%. 1000 milliLiter(s) (100 mL/Hr) IV Continuous <Continuous>  dextrose 50% Injectable 25 Gram(s) IV Push once  dextrose 50% Injectable 12.5 Gram(s) IV Push once  dextrose 50% Injectable 25 Gram(s) IV Push once  diVALproex Sprinkle 250 milliGRAM(s) Oral two times a day  DULoxetine 20 milliGRAM(s) Oral daily  epoetin mari-epbx (RETACRIT) Injectable 99827 Unit(s) IV Push once  folic acid 1 milliGRAM(s) Oral daily  glucagon  Injectable 1 milliGRAM(s) IntraMuscular once  insulin glargine Injectable (LANTUS) 8 Unit(s) SubCutaneous at bedtime  insulin lispro (ADMELOG) corrective regimen sliding scale   SubCutaneous three times a day before meals  insulin lispro (ADMELOG) corrective regimen sliding scale   SubCutaneous at bedtime  insulin lispro Injectable (ADMELOG) 3 Unit(s) SubCutaneous three times a day before meals  levothyroxine 50 MICROGram(s) Oral daily  memantine 5 milliGRAM(s) Oral at bedtime  metoprolol tartrate 50 milliGRAM(s) Oral two times a day  midodrine 10 milliGRAM(s) Oral <User Schedule>  Nephro-celeste 1 Tablet(s) Oral daily  polyethylene glycol 3350 17 Gram(s) Oral two times a day  QUEtiapine 25 milliGRAM(s) Oral three times a day  senna Syrup 10 milliLiter(s) Oral at bedtime  trihexyphenidyl 2 milliGRAM(s) Oral three times a day      Physical Exam  Vital Signs Last 12 Hrs  T(F): 97.9 (12-14-21 @ 12:50), Max: 98.2 (12-14-21 @ 04:00)  HR: 105 (12-14-21 @ 12:50) (95 - 108)  BP: 130/80 (12-14-21 @ 12:50) (103/58 - 139/76)  BP(mean): 86 (12-14-21 @ 12:30) (73 - 86)  RR: 18 (12-14-21 @ 12:50) (18 - 22)  SpO2: 93% (12-14-21 @ 12:50) (92% - 100%)    Diagnostics    Free Thyroxine, Serum: AM Sched. Collection: 08-Dec-2021 06:00 (12-07 @ 14:26)

## 2021-12-15 NOTE — PROGRESS NOTE ADULT - PROBLEM SELECTOR PLAN 1
Will DC standing-dose Admelog for now.  Will continue Lantus 8u at bedtime as well as coverage scale ac/hs. Will continue monitoring  FS and FU.  Patient previously on large-dose basal bolus insulin. Suggest DC on current inpatient insulin regimen, endo FU 4 weeks.   for compliance with consistent low carb diet.

## 2021-12-15 NOTE — PROGRESS NOTE ADULT - ASSESSMENT
72yo M w/ PMHx of CAD (s/p CABG 2019), CKD (unknown stage), DM2, Parkinson's Disease, HTN, depression presents with new onset acute heart failure exacerbation, NSTEMI, started on hep gtt, developed bl RP bleed, acute anemia. sp MICU course for hemorrhagic shock, 10/28 sp IR embolization l4 and l5 lumbar artery. sp permacath and AVF.    # Acute systolic and diastolic heart failure  # NSTEMI, medical management  # ESRD, new HD  # Atrial flutter  # acute hypoxic resp failure  # hemorrhagic shock, left RP hematoma, acute blood loss anemia sp embolization l4 and l5 lumbar artery  # lupus anticoagulant +  TTE mod to severe LV SD, hypokinesis anterior wall  10/28 sp IR embolization l4 and l5 lumbar artery, h/h stable  HD via permacath per renal, midodrine during dialysis  sp L AVF   12/13 pt pulled out permacath  IR to replace today  please ask vascular fu to see if AVF mature?    # Parkinsons disease   # delirium  cont on depakote seroquel and cymbalta  c/w trihexyphenidyl, carbidopa/levodopa   per neuro, probable early lewy body dementia related paranoia and agitation  psych fu    # DM2 (diabetes mellitus, type 2)  per endo  hba1c 6.4    # CAD (coronary artery disease)  # HTN  lopressor 50mg bid  c/w atorvastatin  asa on hold    PCP Dr. Simons    DNR/DNI    dw son Branden over the phone today  family still not ready to stop dialysis  vascular and psych fu    [facility]  [phone]

## 2021-12-15 NOTE — PROGRESS NOTE ADULT - PROBLEM SELECTOR PLAN 3
Per [place] enhancement if needed after sx on top of lasik flap provided that the post-op refractive error is not very myopic. -  -nephrology following    patient follows with Dr. vega ([facility])    Go [facility] D.O.  [phone]

## 2021-12-15 NOTE — PROGRESS NOTE ADULT - ASSESSMENT
72yo M w/ PMHx of CAD (s/p CABG 2019), CKD (unknown stage), DM2, Parkinson's Disease, HTN, depression presents with bilateral leg swelling  ESRD   Severe LV dysfunction; A flutter   Confusion -       1 Palliation - Intermittent confusion   2 Renal-   HD  with epo in am   3 CVS- BP controlled at present, on Midodrine with parameters, Lopressor for heart rate control   4 Anemia -  Retacrit 10k units with  HD   5 Vasc - s/p  LUE AVF ;  Perm cath done        [facility]   [phone]

## 2021-12-15 NOTE — PROGRESS NOTE ADULT - SUBJECTIVE AND OBJECTIVE BOX
Chief complaint  Patient is a 71y old  Male who presents with a chief complaint of leg swelling (15 Dec 2021 09:11)   Review of systems  Patient in bed, looks comfortable, had hypoglycemic episode this afternoon.    Labs and Fingersticks  CAPILLARY BLOOD GLUCOSE      POCT Blood Glucose.: 108 mg/dL (15 Dec 2021 13:33)  POCT Blood Glucose.: 77 mg/dL (15 Dec 2021 13:01)  POCT Blood Glucose.: 72 mg/dL (15 Dec 2021 12:38)  POCT Blood Glucose.: 81 mg/dL (15 Dec 2021 12:19)  POCT Blood Glucose.: 53 mg/dL (15 Dec 2021 11:57)  POCT Blood Glucose.: 57 mg/dL (15 Dec 2021 11:56)  POCT Blood Glucose.: 227 mg/dL (15 Dec 2021 07:42)  POCT Blood Glucose.: 96 mg/dL (14 Dec 2021 22:03)  POCT Blood Glucose.: 105 mg/dL (14 Dec 2021 21:42)  POCT Blood Glucose.: 128 mg/dL (14 Dec 2021 16:49)      Anion Gap, Serum: 14 (12-15 @ 05:22)  Anion Gap, Serum: 17 (12-14 @ 05:41)      Calcium, Total Serum: 9.9 (12-15 @ 05:22)  Calcium, Total Serum: 10.1 (12-14 @ 05:41)          12-15    133<L>  |  95<L>  |  37<H>  ----------------------------<  199<H>  3.8   |  24  |  4.02<H>    Ca    9.9      15 Dec 2021 05:22  Phos  4.2     12-15  Mg     2.0     12-15                          9.1    8.65  )-----------( 263      ( 14 Dec 2021 05:41 )             30.3     Medications  MEDICATIONS  (STANDING):  atorvastatin 80 milliGRAM(s) Oral at bedtime  carbidopa/levodopa  25/100 2.5 Tablet(s) Oral <User Schedule>  carbidopa/levodopa  25/100 2 Tablet(s) Oral <User Schedule>  chlorhexidine 4% Liquid 1 Application(s) Topical <User Schedule>  cholecalciferol 1000 Unit(s) Oral daily  dextrose 40% Gel 15 Gram(s) Oral once  dextrose 5%. 1000 milliLiter(s) (50 mL/Hr) IV Continuous <Continuous>  dextrose 5%. 1000 milliLiter(s) (100 mL/Hr) IV Continuous <Continuous>  dextrose 50% Injectable 25 Gram(s) IV Push once  dextrose 50% Injectable 12.5 Gram(s) IV Push once  dextrose 50% Injectable 25 Gram(s) IV Push once  diVALproex Sprinkle 250 milliGRAM(s) Oral two times a day  DULoxetine 20 milliGRAM(s) Oral daily  folic acid 1 milliGRAM(s) Oral daily  glucagon  Injectable 1 milliGRAM(s) IntraMuscular once  insulin glargine Injectable (LANTUS) 8 Unit(s) SubCutaneous at bedtime  insulin lispro (ADMELOG) corrective regimen sliding scale   SubCutaneous three times a day before meals  insulin lispro (ADMELOG) corrective regimen sliding scale   SubCutaneous at bedtime  levothyroxine 50 MICROGram(s) Oral daily  memantine 5 milliGRAM(s) Oral at bedtime  metoprolol tartrate 50 milliGRAM(s) Oral two times a day  midodrine 10 milliGRAM(s) Oral <User Schedule>  Nephro-celeste 1 Tablet(s) Oral daily  polyethylene glycol 3350 17 Gram(s) Oral two times a day  QUEtiapine 25 milliGRAM(s) Oral three times a day  senna Syrup 10 milliLiter(s) Oral at bedtime  trihexyphenidyl 2 milliGRAM(s) Oral three times a day      Physical Exam  General: Patient comfortable in bed  Vital Signs Last 12 Hrs  T(F): 98 (12-15-21 @ 10:53), Max: 98 (12-15-21 @ 10:53)  HR: 80 (12-15-21 @ 10:53) (80 - 101)  BP: 127/74 (12-15-21 @ 10:53) (127/74 - 134/72)  BP(mean): --  RR: 18 (12-15-21 @ 10:53) (18 - 18)  SpO2: 98% (12-15-21 @ 10:53) (97% - 98%)  Neck: No palpable thyroid nodules.  CVS: S1S2, No murmurs  Respiratory: No wheezing, no crepitations  GI: Abdomen soft, bowel sounds positive  Musculoskeletal:  edema lower extremities.   Skin: No skin rashes, no ecchymosis    Diagnostics    Free Thyroxine, Serum: AM Sched. Collection: 08-Dec-2021 06:00 (12-07 @ 14:26)             Chief complaint  Patient is a 71y old  Male who presents with a chief complaint of leg swelling (15 Dec 2021 09:11)   Review of systems  Patient in bed, looks comfortable, had hypoglycemic  episode this afternoon.    Labs and Fingersticks  CAPILLARY BLOOD GLUCOSE      POCT Blood Glucose.: 108 mg/dL (15 Dec 2021 13:33)  POCT Blood Glucose.: 77 mg/dL (15 Dec 2021 13:01)  POCT Blood Glucose.: 72 mg/dL (15 Dec 2021 12:38)  POCT Blood Glucose.: 81 mg/dL (15 Dec 2021 12:19)  POCT Blood Glucose.: 53 mg/dL (15 Dec 2021 11:57)  POCT Blood Glucose.: 57 mg/dL (15 Dec 2021 11:56)  POCT Blood Glucose.: 227 mg/dL (15 Dec 2021 07:42)  POCT Blood Glucose.: 96 mg/dL (14 Dec 2021 22:03)  POCT Blood Glucose.: 105 mg/dL (14 Dec 2021 21:42)  POCT Blood Glucose.: 128 mg/dL (14 Dec 2021 16:49)      Anion Gap, Serum: 14 (12-15 @ 05:22)  Anion Gap, Serum: 17 (12-14 @ 05:41)      Calcium, Total Serum: 9.9 (12-15 @ 05:22)  Calcium, Total Serum: 10.1 (12-14 @ 05:41)          12-15    133<L>  |  95<L>  |  37<H>  ----------------------------<  199<H>  3.8   |  24  |  4.02<H>    Ca    9.9      15 Dec 2021 05:22  Phos  4.2     12-15  Mg     2.0     12-15                          9.1    8.65  )-----------( 263      ( 14 Dec 2021 05:41 )             30.3     Medications  MEDICATIONS  (STANDING):  atorvastatin 80 milliGRAM(s) Oral at bedtime  carbidopa/levodopa  25/100 2.5 Tablet(s) Oral <User Schedule>  carbidopa/levodopa  25/100 2 Tablet(s) Oral <User Schedule>  chlorhexidine 4% Liquid 1 Application(s) Topical <User Schedule>  cholecalciferol 1000 Unit(s) Oral daily  dextrose 40% Gel 15 Gram(s) Oral once  dextrose 5%. 1000 milliLiter(s) (50 mL/Hr) IV Continuous <Continuous>  dextrose 5%. 1000 milliLiter(s) (100 mL/Hr) IV Continuous <Continuous>  dextrose 50% Injectable 25 Gram(s) IV Push once  dextrose 50% Injectable 12.5 Gram(s) IV Push once  dextrose 50% Injectable 25 Gram(s) IV Push once  diVALproex Sprinkle 250 milliGRAM(s) Oral two times a day  DULoxetine 20 milliGRAM(s) Oral daily  folic acid 1 milliGRAM(s) Oral daily  glucagon  Injectable 1 milliGRAM(s) IntraMuscular once  insulin glargine Injectable (LANTUS) 8 Unit(s) SubCutaneous at bedtime  insulin lispro (ADMELOG) corrective regimen sliding scale   SubCutaneous three times a day before meals  insulin lispro (ADMELOG) corrective regimen sliding scale   SubCutaneous at bedtime  levothyroxine 50 MICROGram(s) Oral daily  memantine 5 milliGRAM(s) Oral at bedtime  metoprolol tartrate 50 milliGRAM(s) Oral two times a day  midodrine 10 milliGRAM(s) Oral <User Schedule>  Nephro-celeste 1 Tablet(s) Oral daily  polyethylene glycol 3350 17 Gram(s) Oral two times a day  QUEtiapine 25 milliGRAM(s) Oral three times a day  senna Syrup 10 milliLiter(s) Oral at bedtime  trihexyphenidyl 2 milliGRAM(s) Oral three times a day      Physical Exam  General: Patient comfortable in bed  Vital Signs Last 12 Hrs  T(F): 98 (12-15-21 @ 10:53), Max: 98 (12-15-21 @ 10:53)  HR: 80 (12-15-21 @ 10:53) (80 - 101)  BP: 127/74 (12-15-21 @ 10:53) (127/74 - 134/72)  BP(mean): --  RR: 18 (12-15-21 @ 10:53) (18 - 18)  SpO2: 98% (12-15-21 @ 10:53) (97% - 98%)  Neck: No palpable thyroid nodules.  CVS: S1S2, No murmurs  Respiratory: No wheezing, no crepitations  GI: Abdomen soft, bowel sounds positive  Musculoskeletal:  edema lower extremities.   Skin: No skin rashes, no ecchymosis    Diagnostics    Free Thyroxine, Serum: AM Sched. Collection: 08-Dec-2021 06:00 (12-07 @ 14:26)

## 2021-12-15 NOTE — PROGRESS NOTE ADULT - ASSESSMENT
Assessment  DMT2: 71y Male with DM T2 with hyperglycemia, A1C 6.4%, was on insulin at home, now on low-dose basal bolus insulin, blood sugars are fluctuating, had hypoglycemic episode this afternoon, s/p permacath, eating partial meals in NAD.  Hypothyroidism: Patient has no history thyroid disease, was not on any thyroid supplements, subclinical with low-normal FT4, lethargic, started on synthroid 25 mcg po daily, repeat FT4 trending down, increased to synthroid 50 mcg po daily.  CHF: on medications, stable, monitored.  HTN: Controlled,  on antihypertensive medications.  Parkinsons: on meds, monitored.  CKD: Monitor labs/BMP      Kelsie Reece MD  Cell: [phone]  Office: [phone]     Assessment  DMT2: 71y Male with DM T2 with hyperglycemia, A1C 6.4%, was on insulin at home, now on low-dose basal bolus insulin, blood sugars are fluctuating, had hypoglycemic episode this afternoon, s/p permacath, eating partial meals in NAD.  Hypothyroidism: Patient has no history thyroid disease, was not on any thyroid supplements, subclinical with low-normal FT4, lethargic, started on synthroid 25 mcg po daily, repeat FT4 trending down, increased to  synthroid 50 mcg po daily.  CHF: on medications, stable, monitored.  HTN: Controlled,  on antihypertensive medications.  Parkinsons: on meds, monitored.  CKD: Monitor labs/BMP      Kelsie Reece MD  Cell: [phone]  Office: [phone]

## 2021-12-15 NOTE — PROGRESS NOTE ADULT - SUBJECTIVE AND OBJECTIVE BOX
[facility] Cardiology Progress Note  _______________________________    Pt. seen and examined. Sleeping.    T(C): 36.3 (12-15-21 @ 04:03), Max: 36.8 (12-14-21 @ 09:37)  HR: 101 (12-15-21 @ 04:03) (85 - 108)  BP: 134/72 (12-15-21 @ 04:03) (101/61 - 139/76)  RR: 18 (12-15-21 @ 04:03) (18 - 22)  SpO2: 97% (12-15-21 @ 04:03) (92% - 100%)  I&O's Summary    14 Dec 2021 07:01  -  15 Dec 2021 07:00  --------------------------------------------------------  IN: 900 mL / OUT: 500 mL / NET: 400 mL        PHYSICAL EXAM:  GENERAL: Alert, NAD.  NECK: Supple.  CHEST/LUNG: Clear to auscultation bilaterally; No wheezes, rales, or rhonchi.  HEART: S1 S2 normal, RRR; No murmurs, rubs, or gallops  ABDOMEN: Soft, Nondistended  EXTREMITIES:  No LE edema.      LABS:                        9.1    8.65  )-----------( 263      ( 14 Dec 2021 05:41 )             30.3     12-15    133<L>  |  95<L>  |  37<H>  ----------------------------<  199<H>  3.8   |  24  |  4.02<H>    Ca    9.9      15 Dec 2021 05:22  Phos  4.2     12-15  Mg     2.0     12-15      PT/INR - ( 14 Dec 2021 05:43 )   PT: 12.8 sec;   INR: 1.07 ratio         PTT - ( 14 Dec 2021 05:43 )  PTT:49.7 sec              MEDICATIONS  (STANDING):  atorvastatin 80 milliGRAM(s) Oral at bedtime  carbidopa/levodopa  25/100 2.5 Tablet(s) Oral <User Schedule>  carbidopa/levodopa  25/100 2 Tablet(s) Oral <User Schedule>  chlorhexidine 4% Liquid 1 Application(s) Topical <User Schedule>  cholecalciferol 1000 Unit(s) Oral daily  dextrose 40% Gel 15 Gram(s) Oral once  dextrose 5%. 1000 milliLiter(s) (50 mL/Hr) IV Continuous <Continuous>  dextrose 5%. 1000 milliLiter(s) (100 mL/Hr) IV Continuous <Continuous>  dextrose 50% Injectable 25 Gram(s) IV Push once  dextrose 50% Injectable 12.5 Gram(s) IV Push once  dextrose 50% Injectable 25 Gram(s) IV Push once  diVALproex Sprinkle 250 milliGRAM(s) Oral two times a day  DULoxetine 20 milliGRAM(s) Oral daily  folic acid 1 milliGRAM(s) Oral daily  glucagon  Injectable 1 milliGRAM(s) IntraMuscular once  insulin glargine Injectable (LANTUS) 8 Unit(s) SubCutaneous at bedtime  insulin lispro (ADMELOG) corrective regimen sliding scale   SubCutaneous three times a day before meals  insulin lispro (ADMELOG) corrective regimen sliding scale   SubCutaneous at bedtime  insulin lispro Injectable (ADMELOG) 3 Unit(s) SubCutaneous three times a day before meals  levothyroxine 50 MICROGram(s) Oral daily  memantine 5 milliGRAM(s) Oral at bedtime  metoprolol tartrate 50 milliGRAM(s) Oral two times a day  midodrine 10 milliGRAM(s) Oral <User Schedule>  Nephro-celeste 1 Tablet(s) Oral daily  polyethylene glycol 3350 17 Gram(s) Oral two times a day  QUEtiapine 25 milliGRAM(s) Oral three times a day  senna Syrup 10 milliLiter(s) Oral at bedtime  trihexyphenidyl 2 milliGRAM(s) Oral three times a day    MEDICATIONS  (PRN):  acetaminophen     Tablet .. 650 milliGRAM(s) Oral every 6 hours PRN Mild Pain (1 - 3)  albuterol/ipratropium for Nebulization 3 milliLiter(s) Nebulizer every 6 hours PRN Shortness of Breath and/or Wheezing  diVALproex Sprinkle 125 milliGRAM(s) Oral every 8 hours PRN Agitation  guaiFENesin Oral Liquid (Sugar-Free) 200 milliGRAM(s) Oral every 6 hours PRN Cough  HYDROmorphone  Injectable 0.25 milliGRAM(s) IV Push every 10 minutes PRN Moderate Pain (4 - 6)  ondansetron Injectable 4 milliGRAM(s) IV Push once PRN Nausea and/or Vomiting  QUEtiapine 12.5 milliGRAM(s) Oral every 6 hours PRN agitation  sodium chloride 0.9% lock flush 10 milliLiter(s) IV Push every 1 hour PRN Pre/post blood products, medications, blood draw, and to maintain line patency        RADIOLOGY & ADDITIONAL TESTS:

## 2021-12-15 NOTE — PROGRESS NOTE ADULT - PROBLEM SELECTOR PLAN 1
-  -not on telemetry. no need for now  -rates controlled.   -lopressor 50 mg bid  -not a candidate for anticoagulation given comorbidities as noted earlier.

## 2021-12-15 NOTE — PROGRESS NOTE ADULT - PROBLEM SELECTOR PLAN 2
Will continue synthroid 50 mcg po daily. Will repeat FT4 and FU.  Suggest to FU outpatient in 4-6 weeks to repeat TFTs.  Discussed plan with patient.

## 2021-12-15 NOTE — PROGRESS NOTE ADULT - SUBJECTIVE AND OBJECTIVE BOX
[facility] [phone]        Patient seen and examined in bed.  He was the same         MEDICATIONS  (STANDING):  atorvastatin 80 milliGRAM(s) Oral at bedtime  carbidopa/levodopa  25/100 2.5 Tablet(s) Oral <User Schedule>  carbidopa/levodopa  25/100 2 Tablet(s) Oral <User Schedule>  chlorhexidine 4% Liquid 1 Application(s) Topical <User Schedule>  cholecalciferol 1000 Unit(s) Oral daily  dextrose 40% Gel 15 Gram(s) Oral once  dextrose 5%. 1000 milliLiter(s) (50 mL/Hr) IV Continuous <Continuous>  dextrose 5%. 1000 milliLiter(s) (100 mL/Hr) IV Continuous <Continuous>  dextrose 50% Injectable 25 Gram(s) IV Push once  dextrose 50% Injectable 12.5 Gram(s) IV Push once  dextrose 50% Injectable 25 Gram(s) IV Push once  diVALproex Sprinkle 250 milliGRAM(s) Oral two times a day  DULoxetine 20 milliGRAM(s) Oral daily  folic acid 1 milliGRAM(s) Oral daily  glucagon  Injectable 1 milliGRAM(s) IntraMuscular once  insulin glargine Injectable (LANTUS) 8 Unit(s) SubCutaneous at bedtime  insulin lispro (ADMELOG) corrective regimen sliding scale   SubCutaneous three times a day before meals  insulin lispro (ADMELOG) corrective regimen sliding scale   SubCutaneous at bedtime  insulin lispro Injectable (ADMELOG) 3 Unit(s) SubCutaneous three times a day before meals  levothyroxine 50 MICROGram(s) Oral daily  memantine 5 milliGRAM(s) Oral at bedtime  metoprolol tartrate 50 milliGRAM(s) Oral two times a day  midodrine 10 milliGRAM(s) Oral <User Schedule>  Nephro-celeste 1 Tablet(s) Oral daily  polyethylene glycol 3350 17 Gram(s) Oral two times a day  QUEtiapine 25 milliGRAM(s) Oral three times a day  senna Syrup 10 milliLiter(s) Oral at bedtime  trihexyphenidyl 2 milliGRAM(s) Oral three times a day      VITAL:  T(C): , Max: 36.8 (12-14-21 @ 09:37)  T(F): , Max: 98.2 (12-14-21 @ 09:37)  HR: 101 (12-15-21 @ 04:03)  BP: 134/72 (12-15-21 @ 04:03)  BP(mean): 86 (12-14-21 @ 12:30)  RR: 18 (12-15-21 @ 04:03)  SpO2: 97% (12-15-21 @ 04:03)  Wt(kg): --    I and O's:    12-14 @ 07:01  -  12-15 @ 07:00  --------------------------------------------------------  IN: 900 mL / OUT: 500 mL / NET: 400 mL      Height (cm): 180.3 (12-14 @ 10:55)  Weight (kg): 96.4 (12-14 @ 10:55)  BMI (kg/m2): 29.7 (12-14 @ 10:55)  BSA (m2): 2.16 (12-14 @ 10:55)    PHYSICAL EXAM:    Constitutional: NAD  Neck:  No JVD  Respiratory: CTAB/L  Cardiovascular: S1 and S2  Gastrointestinal: BS+, soft, NT/ND  Extremities: No peripheral edema  Neurological: A/O x 3, no focal deficits  Psychiatric: Normal mood, normal affect  : No Javier  Skin: No rashes  Access: perm cath; left  avf     LABS:                        9.1    8.65  )-----------( 263      ( 14 Dec 2021 05:41 )             30.3     12-15    133<L>  |  95<L>  |  37<H>  ----------------------------<  199<H>  3.8   |  24  |  4.02<H>    Ca    9.9      15 Dec 2021 05:22  Phos  4.2     12-15  Mg     2.0     12-15            Urine Studies:          RADIOLOGY & ADDITIONAL STUDIES:

## 2021-12-15 NOTE — PROGRESS NOTE ADULT - SUBJECTIVE AND OBJECTIVE BOX
Patient is a 71y old  Male who presents with a chief complaint of leg swelling (15 Dec 2021 14:36)      INTERVAL HPI/OVERNIGHT EVENTS: noted  pt seen and examined this am   events noted  no new events/complaints      Vital Signs Last 24 Hrs  T(C): 37 (15 Dec 2021 20:28), Max: 37.1 (15 Dec 2021 17:46)  T(F): 98.6 (15 Dec 2021 20:28), Max: 98.8 (15 Dec 2021 17:46)  HR: 78 (15 Dec 2021 20:28) (78 - 101)  BP: 133/78 (15 Dec 2021 20:28) (126/71 - 134/72)  BP(mean): --  RR: 18 (15 Dec 2021 20:28) (18 - 18)  SpO2: 95% (15 Dec 2021 20:28) (93% - 98%)    acetaminophen     Tablet .. 650 milliGRAM(s) Oral every 6 hours PRN  albuterol/ipratropium for Nebulization 3 milliLiter(s) Nebulizer every 6 hours PRN  atorvastatin 80 milliGRAM(s) Oral at bedtime  carbidopa/levodopa  25/100 2.5 Tablet(s) Oral <User Schedule>  carbidopa/levodopa  25/100 2 Tablet(s) Oral <User Schedule>  chlorhexidine 4% Liquid 1 Application(s) Topical <User Schedule>  cholecalciferol 1000 Unit(s) Oral daily  dextrose 40% Gel 15 Gram(s) Oral once  dextrose 5%. 1000 milliLiter(s) IV Continuous <Continuous>  dextrose 5%. 1000 milliLiter(s) IV Continuous <Continuous>  dextrose 50% Injectable 25 Gram(s) IV Push once  dextrose 50% Injectable 12.5 Gram(s) IV Push once  dextrose 50% Injectable 25 Gram(s) IV Push once  diVALproex Sprinkle 125 milliGRAM(s) Oral every 8 hours PRN  diVALproex Sprinkle 250 milliGRAM(s) Oral three times a day  DULoxetine 20 milliGRAM(s) Oral daily  folic acid 1 milliGRAM(s) Oral daily  glucagon  Injectable 1 milliGRAM(s) IntraMuscular once  guaiFENesin Oral Liquid (Sugar-Free) 200 milliGRAM(s) Oral every 6 hours PRN  HYDROmorphone  Injectable 0.25 milliGRAM(s) IV Push every 10 minutes PRN  insulin glargine Injectable (LANTUS) 8 Unit(s) SubCutaneous at bedtime  insulin lispro (ADMELOG) corrective regimen sliding scale   SubCutaneous three times a day before meals  insulin lispro (ADMELOG) corrective regimen sliding scale   SubCutaneous at bedtime  levothyroxine 50 MICROGram(s) Oral daily  LORazepam     Tablet 0.25 milliGRAM(s) Oral every 8 hours PRN  memantine 5 milliGRAM(s) Oral at bedtime  metoprolol tartrate 50 milliGRAM(s) Oral two times a day  midodrine 10 milliGRAM(s) Oral <User Schedule>  Nephro-celeste 1 Tablet(s) Oral daily  ondansetron Injectable 4 milliGRAM(s) IV Push once PRN  polyethylene glycol 3350 17 Gram(s) Oral two times a day  QUEtiapine 25 milliGRAM(s) Oral three times a day  QUEtiapine 12.5 milliGRAM(s) Oral every 6 hours PRN  senna Syrup 10 milliLiter(s) Oral at bedtime  sodium chloride 0.9% lock flush 10 milliLiter(s) IV Push every 1 hour PRN  trihexyphenidyl 2 milliGRAM(s) Oral three times a day      PHYSICAL EXAM:  GENERAL: NAD,   EYES: conjunctiva and sclera clear  ENMT: Moist mucous membranes  NECK: Supple, No JVD, Normal thyroid  CHEST/LUNG: non labored, cta b/l  HEART: Regular rate and rhythm; No murmurs, rubs, or gallops  ABDOMEN: Soft, Nontender, Nondistended; Bowel sounds present  EXTREMITIES:  2+ Peripheral Pulses, No clubbing, cyanosis, or edema  LYMPH: No lymphadenopathy noted  SKIN: No rashes or lesions    Consultant(s) Notes Reviewed:  [x ] YES  [ ] NO  Care Discussed with Consultants/Other Providers [ x] YES  [ ] NO    LABS:                        9.1    8.65  )-----------( 263      ( 14 Dec 2021 05:41 )             30.3     12-15    133<L>  |  95<L>  |  37<H>  ----------------------------<  199<H>  3.8   |  24  |  4.02<H>    Ca    9.9      15 Dec 2021 05:22  Phos  4.2     12-15  Mg     2.0     12-15      PT/INR - ( 14 Dec 2021 05:43 )   PT: 12.8 sec;   INR: 1.07 ratio         PTT - ( 14 Dec 2021 05:43 )  PTT:49.7 sec    CAPILLARY BLOOD GLUCOSE      POCT Blood Glucose.: 121 mg/dL (15 Dec 2021 22:09)  POCT Blood Glucose.: 181 mg/dL (15 Dec 2021 16:46)  POCT Blood Glucose.: 108 mg/dL (15 Dec 2021 13:33)  POCT Blood Glucose.: 77 mg/dL (15 Dec 2021 13:01)  POCT Blood Glucose.: 72 mg/dL (15 Dec 2021 12:38)  POCT Blood Glucose.: 81 mg/dL (15 Dec 2021 12:19)  POCT Blood Glucose.: 53 mg/dL (15 Dec 2021 11:57)  POCT Blood Glucose.: 57 mg/dL (15 Dec 2021 11:56)  POCT Blood Glucose.: 227 mg/dL (15 Dec 2021 07:42)              RADIOLOGY & ADDITIONAL TESTS:    Imaging Personally Reviewed:  [x ] YES  [ ] NO

## 2021-12-16 NOTE — PROGRESS NOTE ADULT - PROBLEM SELECTOR PLAN 2
Will continue synthroid 50 mcg po daily. Will continue monitoring and FU.  Suggest to FU outpatient in 4-6 weeks to repeat TFTs.  Discussed plan with patient.

## 2021-12-16 NOTE — PROGRESS NOTE ADULT - ASSESSMENT
Assessment  DMT2: 71y Male with DM T2 with hyperglycemia, A1C 6.4%, was on insulin at home, DC'd bolus dose s/p hypoglycemia yesterday, now on basal insulin and coverage, blood sugars improving and in acceptable range today, no new hypoglycemias, eating partial meals in NAD.  Hypothyroidism: Patient has no history thyroid disease, was not on any thyroid supplements, subclinical with low-normal FT4, lethargic, started on synthroid 25 mcg po daily, repeat FT4 trending down, increased to synthroid 50 mcg po daily, FT4 improving.  CHF: on medications, stable, monitored.  HTN: Controlled,  on antihypertensive medications.  Parkinsons: on meds, monitored.  CKD: Monitor labs/BMP      Kelsie Reece MD  Cell: [phone]  Office: [phone]     Assessment  DMT2: 71y Male with DM T2 with hyperglycemia, A1C 6.4%, was on insulin at home, DC'd bolus dose s/p hypoglycemia yesterday, now on basal insulin and coverage, blood sugars improving and in  acceptable range today, no new hypoglycemias, eating partial meals in NAD.  Hypothyroidism: Patient has no history thyroid disease, was not on any thyroid supplements, subclinical with low-normal FT4, lethargic, started on synthroid 25 mcg po daily, repeat FT4 trending down, increased to synthroid 50 mcg po daily, FT4 improving.  CHF: on medications, stable, monitored.  HTN: Controlled,  on antihypertensive medications.  Parkinsons: on meds, monitored.  CKD: Monitor labs/BMP      Kelsie Reece MD  Cell: [phone]  Office: [phone]

## 2021-12-16 NOTE — PROGRESS NOTE ADULT - SUBJECTIVE AND OBJECTIVE BOX
[facility] [phone]        Patient seen and examined in bed.  He was the same         MEDICATIONS  (STANDING):  atorvastatin 80 milliGRAM(s) Oral at bedtime  carbidopa/levodopa  25/100 2.5 Tablet(s) Oral <User Schedule>  carbidopa/levodopa  25/100 2 Tablet(s) Oral <User Schedule>  chlorhexidine 4% Liquid 1 Application(s) Topical <User Schedule>  cholecalciferol 1000 Unit(s) Oral daily  dextrose 40% Gel 15 Gram(s) Oral once  dextrose 5%. 1000 milliLiter(s) (50 mL/Hr) IV Continuous <Continuous>  dextrose 5%. 1000 milliLiter(s) (100 mL/Hr) IV Continuous <Continuous>  dextrose 50% Injectable 25 Gram(s) IV Push once  dextrose 50% Injectable 12.5 Gram(s) IV Push once  dextrose 50% Injectable 25 Gram(s) IV Push once  diVALproex Sprinkle 250 milliGRAM(s) Oral three times a day  DULoxetine 20 milliGRAM(s) Oral daily  folic acid 1 milliGRAM(s) Oral daily  glucagon  Injectable 1 milliGRAM(s) IntraMuscular once  insulin glargine Injectable (LANTUS) 8 Unit(s) SubCutaneous at bedtime  insulin lispro (ADMELOG) corrective regimen sliding scale   SubCutaneous three times a day before meals  insulin lispro (ADMELOG) corrective regimen sliding scale   SubCutaneous at bedtime  levothyroxine 50 MICROGram(s) Oral daily  memantine 5 milliGRAM(s) Oral at bedtime  metoprolol tartrate 50 milliGRAM(s) Oral two times a day  midodrine 10 milliGRAM(s) Oral <User Schedule>  Nephro-celeste 1 Tablet(s) Oral daily  polyethylene glycol 3350 17 Gram(s) Oral two times a day  QUEtiapine 25 milliGRAM(s) Oral three times a day  senna Syrup 10 milliLiter(s) Oral at bedtime  trihexyphenidyl 2 milliGRAM(s) Oral three times a day      VITAL:  T(C): , Max: 37.2 (12-16-21 @ 04:04)  T(F): , Max: 98.9 (12-16-21 @ 04:04)  HR: 87 (12-16-21 @ 09:26)  BP: 126/64 (12-16-21 @ 09:26)  BP(mean): --  RR: 18 (12-16-21 @ 09:26)  SpO2: 96% (12-16-21 @ 09:26)  Wt(kg): --    I and O's:    12-15 @ 07:01  -  12-16 @ 07:00  --------------------------------------------------------  IN: 340 mL / OUT: 0 mL / NET: 340 mL          PHYSICAL EXAM:    Constitutional: NAD  Neck:  No JVD  Respiratory: CTAB/L  Cardiovascular: S1 and S2  Gastrointestinal: BS+, soft, NT/ND  Extremities: No peripheral edema  Neurological: A/O x 3, no focal deficits  Psychiatric: Normal mood, normal affect  : No Javier  Skin: No rashes  Access: Not applicable    LABS:                        8.5    8.14  )-----------( 225      ( 16 Dec 2021 05:25 )             27.7     12-16    132<L>  |  93<L>  |  49<H>  ----------------------------<  89  4.0   |  24  |  5.34<H>    Ca    9.8      16 Dec 2021 05:25  Phos  4.2     12-15  Mg     2.0     12-15    TPro  7.5  /  Alb  2.3<L>  /  TBili  0.5  /  DBili  x   /  AST  26  /  ALT  <5<L>  /  AlkPhos  83  12-16          Urine Studies:          RADIOLOGY & ADDITIONAL STUDIES:

## 2021-12-16 NOTE — PROGRESS NOTE ADULT - PROBLEM SELECTOR PLAN 1
-  -not on telemetry. no need for now  -rates controlled.   -lopressor 50 mg bid.  -not a candidate for anticoagulation given comorbidities as noted earlier.

## 2021-12-16 NOTE — PROGRESS NOTE ADULT - PROBLEM SELECTOR PLAN 3
-  -nephrology following  -s/p permacath     patient follows with Dr. vega ([facility])    Go Bobby D.O.  [phone]

## 2021-12-16 NOTE — PROGRESS NOTE ADULT - SUBJECTIVE AND OBJECTIVE BOX
[facility] Cardiology Progress Note  _______________________________    Pt. seen and examined. No new cardiac-related complaints.    T(C): 37.2 (12-16-21 @ 04:04), Max: 37.2 (12-16-21 @ 04:04)  HR: 96 (12-16-21 @ 04:04) (78 - 96)  BP: 135/80 (12-16-21 @ 04:04) (126/71 - 135/80)  RR: 18 (12-16-21 @ 04:04) (18 - 18)  SpO2: 91% (12-16-21 @ 04:04) (91% - 98%)  I&O's Summary    15 Dec 2021 07:01  -  16 Dec 2021 07:00  --------------------------------------------------------  IN: 340 mL / OUT: 0 mL / NET: 340 mL        PHYSICAL EXAM:  GENERAL: Alert, NAD.  NECK: Supple.  CHEST/LUNG: Clear to auscultation bilaterally; No wheezes, rales, or rhonchi.  HEART: S1 S2 normal, RRR; No murmurs, rubs, or gallops  ABDOMEN: Soft, Nondistended  EXTREMITIES:  No LE edema.      LABS:                        8.5    8.14  )-----------( 225      ( 16 Dec 2021 05:25 )             27.7     12-16    132<L>  |  93<L>  |  49<H>  ----------------------------<  89  4.0   |  24  |  5.34<H>    Ca    9.8      16 Dec 2021 05:25  Phos  4.2     12-15  Mg     2.0     12-15    TPro  7.5  /  Alb  2.3<L>  /  TBili  0.5  /  DBili  x   /  AST  26  /  ALT  <5<L>  /  AlkPhos  83  12-16                  MEDICATIONS  (STANDING):  atorvastatin 80 milliGRAM(s) Oral at bedtime  carbidopa/levodopa  25/100 2.5 Tablet(s) Oral <User Schedule>  carbidopa/levodopa  25/100 2 Tablet(s) Oral <User Schedule>  chlorhexidine 4% Liquid 1 Application(s) Topical <User Schedule>  cholecalciferol 1000 Unit(s) Oral daily  dextrose 40% Gel 15 Gram(s) Oral once  dextrose 5%. 1000 milliLiter(s) (50 mL/Hr) IV Continuous <Continuous>  dextrose 5%. 1000 milliLiter(s) (100 mL/Hr) IV Continuous <Continuous>  dextrose 50% Injectable 25 Gram(s) IV Push once  dextrose 50% Injectable 12.5 Gram(s) IV Push once  dextrose 50% Injectable 25 Gram(s) IV Push once  diVALproex Sprinkle 250 milliGRAM(s) Oral three times a day  DULoxetine 20 milliGRAM(s) Oral daily  epoetin mari-epbx (RETACRIT) Injectable 64244 Unit(s) IV Push once  folic acid 1 milliGRAM(s) Oral daily  glucagon  Injectable 1 milliGRAM(s) IntraMuscular once  insulin glargine Injectable (LANTUS) 8 Unit(s) SubCutaneous at bedtime  insulin lispro (ADMELOG) corrective regimen sliding scale   SubCutaneous three times a day before meals  insulin lispro (ADMELOG) corrective regimen sliding scale   SubCutaneous at bedtime  levothyroxine 50 MICROGram(s) Oral daily  memantine 5 milliGRAM(s) Oral at bedtime  metoprolol tartrate 50 milliGRAM(s) Oral two times a day  midodrine 10 milliGRAM(s) Oral <User Schedule>  Nephro-celeste 1 Tablet(s) Oral daily  polyethylene glycol 3350 17 Gram(s) Oral two times a day  QUEtiapine 25 milliGRAM(s) Oral three times a day  senna Syrup 10 milliLiter(s) Oral at bedtime  trihexyphenidyl 2 milliGRAM(s) Oral three times a day    MEDICATIONS  (PRN):  acetaminophen     Tablet .. 650 milliGRAM(s) Oral every 6 hours PRN Mild Pain (1 - 3)  albuterol/ipratropium for Nebulization 3 milliLiter(s) Nebulizer every 6 hours PRN Shortness of Breath and/or Wheezing  diVALproex Sprinkle 125 milliGRAM(s) Oral every 8 hours PRN Agitation  guaiFENesin Oral Liquid (Sugar-Free) 200 milliGRAM(s) Oral every 6 hours PRN Cough  HYDROmorphone  Injectable 0.25 milliGRAM(s) IV Push every 10 minutes PRN Moderate Pain (4 - 6)  LORazepam     Tablet 0.25 milliGRAM(s) Oral every 8 hours PRN Agitation (severe agitation ONLY)  ondansetron Injectable 4 milliGRAM(s) IV Push once PRN Nausea and/or Vomiting  QUEtiapine 12.5 milliGRAM(s) Oral every 6 hours PRN agitation  sodium chloride 0.9% lock flush 10 milliLiter(s) IV Push every 1 hour PRN Pre/post blood products, medications, blood draw, and to maintain line patency        RADIOLOGY & ADDITIONAL TESTS:

## 2021-12-16 NOTE — PROGRESS NOTE ADULT - ASSESSMENT
72yo M w/ PMHx of CAD (s/p CABG 2019), CKD (unknown stage), DM2, Parkinson's Disease, HTN, depression presents with new onset acute heart failure exacerbation, NSTEMI, started on hep gtt, developed bl RP bleed, acute anemia. sp MICU course for hemorrhagic shock, 10/28 sp IR embolization l4 and l5 lumbar artery. sp permacath and AVF.    # Acute systolic and diastolic heart failure  # NSTEMI, medical management  # ESRD, new HD  # Atrial flutter  # acute hypoxic resp failure  # hemorrhagic shock, left RP hematoma, acute blood loss anemia sp embolization l4 and l5 lumbar artery  # lupus anticoagulant +  TTE mod to severe LV SD, hypokinesis anterior wall  10/28 sp IR embolization l4 and l5 lumbar artery, h/h stable  HD via permacath per renal, midodrine during dialysis  sp L AVF   12/13 pt pulled out permacath  IR to replace today  please ask vascular fu to see if AVF mature?    # Parkinsons disease   # delirium  cont on depakote seroquel and cymbalta  c/w trihexyphenidyl, carbidopa/levodopa   per neuro, probable early lewy body dementia related paranoia and agitation  psych fu    # DM2 (diabetes mellitus, type 2)  per endo  hba1c 6.4    # CAD (coronary artery disease)  # HTN  lopressor 50mg bid  c/w atorvastatin  asa on hold    PCP Dr. Simons    DNR/DNI    dw son Branden over the phone today  family still not ready to stop dialysis  vascular and psych fu    [facility]  [phone]

## 2021-12-16 NOTE — PROGRESS NOTE ADULT - SUBJECTIVE AND OBJECTIVE BOX
Patient is a 71y old  Male who presents with a chief complaint of leg swelling (16 Dec 2021 10:45)      INTERVAL HPI/OVERNIGHT EVENTS: noted  pt seen and examined this am   events noted  feels well      Vital Signs Last 24 Hrs  T(C): 36.4 (16 Dec 2021 12:26), Max: 37.2 (16 Dec 2021 04:04)  T(F): 97.5 (16 Dec 2021 12:26), Max: 98.9 (16 Dec 2021 04:04)  HR: 91 (16 Dec 2021 12:26) (78 - 96)  BP: 122/61 (16 Dec 2021 12:26) (122/61 - 135/80)  BP(mean): --  RR: 18 (16 Dec 2021 12:26) (18 - 18)  SpO2: 95% (16 Dec 2021 12:26) (91% - 96%)    acetaminophen     Tablet .. 650 milliGRAM(s) Oral every 6 hours PRN  albuterol/ipratropium for Nebulization 3 milliLiter(s) Nebulizer every 6 hours PRN  atorvastatin 80 milliGRAM(s) Oral at bedtime  carbidopa/levodopa  25/100 2.5 Tablet(s) Oral <User Schedule>  carbidopa/levodopa  25/100 2 Tablet(s) Oral <User Schedule>  chlorhexidine 4% Liquid 1 Application(s) Topical <User Schedule>  cholecalciferol 1000 Unit(s) Oral daily  dextrose 40% Gel 15 Gram(s) Oral once  dextrose 5%. 1000 milliLiter(s) IV Continuous <Continuous>  dextrose 5%. 1000 milliLiter(s) IV Continuous <Continuous>  dextrose 50% Injectable 25 Gram(s) IV Push once  dextrose 50% Injectable 12.5 Gram(s) IV Push once  dextrose 50% Injectable 25 Gram(s) IV Push once  diVALproex Sprinkle 125 milliGRAM(s) Oral every 8 hours PRN  diVALproex Sprinkle 250 milliGRAM(s) Oral three times a day  DULoxetine 20 milliGRAM(s) Oral daily  folic acid 1 milliGRAM(s) Oral daily  glucagon  Injectable 1 milliGRAM(s) IntraMuscular once  guaiFENesin Oral Liquid (Sugar-Free) 200 milliGRAM(s) Oral every 6 hours PRN  HYDROmorphone  Injectable 0.25 milliGRAM(s) IV Push every 10 minutes PRN  insulin glargine Injectable (LANTUS) 8 Unit(s) SubCutaneous at bedtime  insulin lispro (ADMELOG) corrective regimen sliding scale   SubCutaneous three times a day before meals  insulin lispro (ADMELOG) corrective regimen sliding scale   SubCutaneous at bedtime  levothyroxine 50 MICROGram(s) Oral daily  LORazepam     Tablet 0.25 milliGRAM(s) Oral every 8 hours PRN  memantine 5 milliGRAM(s) Oral at bedtime  metoprolol tartrate 50 milliGRAM(s) Oral two times a day  midodrine 10 milliGRAM(s) Oral <User Schedule>  Nephro-celeste 1 Tablet(s) Oral daily  ondansetron Injectable 4 milliGRAM(s) IV Push once PRN  polyethylene glycol 3350 17 Gram(s) Oral two times a day  QUEtiapine 25 milliGRAM(s) Oral three times a day  QUEtiapine 12.5 milliGRAM(s) Oral every 6 hours PRN  senna Syrup 10 milliLiter(s) Oral at bedtime  sodium chloride 0.9% lock flush 10 milliLiter(s) IV Push every 1 hour PRN  trihexyphenidyl 2 milliGRAM(s) Oral three times a day      PHYSICAL EXAM:  GENERAL: NAD,   EYES: conjunctiva and sclera clear  ENMT: Moist mucous membranes  NECK: Supple, No JVD, Normal thyroid  CHEST/LUNG: non labored, cta b/l  HEART: Regular rate and rhythm; No murmurs, rubs, or gallops  ABDOMEN: Soft, Nontender, Nondistended; Bowel sounds present  EXTREMITIES:  2+ Peripheral Pulses, No clubbing, cyanosis, or edema  LYMPH: No lymphadenopathy noted  SKIN: No rashes or lesions    Consultant(s) Notes Reviewed:  [x ] YES  [ ] NO  Care Discussed with Consultants/Other Providers [ x] YES  [ ] NO    LABS:                        8.5    8.14  )-----------( 225      ( 16 Dec 2021 05:25 )             27.7     12-16    132<L>  |  93<L>  |  49<H>  ----------------------------<  89  4.0   |  24  |  5.34<H>    Ca    9.8      16 Dec 2021 05:25  Phos  4.2     12-15  Mg     2.0     12-15    TPro  7.5  /  Alb  2.3<L>  /  TBili  0.5  /  DBili  x   /  AST  26  /  ALT  <5<L>  /  AlkPhos  83  12-16        CAPILLARY BLOOD GLUCOSE      POCT Blood Glucose.: 145 mg/dL (16 Dec 2021 12:46)  POCT Blood Glucose.: 109 mg/dL (16 Dec 2021 07:41)  POCT Blood Glucose.: 121 mg/dL (15 Dec 2021 22:09)  POCT Blood Glucose.: 181 mg/dL (15 Dec 2021 16:46)              RADIOLOGY & ADDITIONAL TESTS:    Imaging Personally Reviewed:  [x ] YES  [ ] NO

## 2021-12-16 NOTE — PROGRESS NOTE ADULT - PROBLEM SELECTOR PLAN 1
Will continue Lantus 8u at bedtime as well as coverage scale ac/hs. Will continue monitoring FS and FU.  Patient previously on large-dose basal bolus insulin. Suggest DC on current inpatient insulin regimen, endo FU 4 weeks.   for compliance with consistent low carb diet.

## 2021-12-16 NOTE — PROGRESS NOTE ADULT - SUBJECTIVE AND OBJECTIVE BOX
Chief complaint  Patient is a 71y old  Male who presents with a chief complaint of leg swelling (16 Dec 2021 14:33)   Review of systems  Patient in bed, looks comfortable, no new hypoglycemic episodes.    Labs and Fingersticks  CAPILLARY BLOOD GLUCOSE      POCT Blood Glucose.: 145 mg/dL (16 Dec 2021 12:46)  POCT Blood Glucose.: 109 mg/dL (16 Dec 2021 07:41)  POCT Blood Glucose.: 121 mg/dL (15 Dec 2021 22:09)  POCT Blood Glucose.: 181 mg/dL (15 Dec 2021 16:46)      Anion Gap, Serum: 15 (12-16 @ 05:25)  Anion Gap, Serum: 14 (12-15 @ 05:22)      Calcium, Total Serum: 9.8 (12-16 @ 05:25)  Calcium, Total Serum: 9.9 (12-15 @ 05:22)  Albumin, Serum: 2.3 *L* (12-16 @ 05:25)    Alanine Aminotransferase (ALT/SGPT): <5 *L* (12-16 @ 05:25)  Alkaline Phosphatase, Serum: 83 (12-16 @ 05:25)  Aspartate Aminotransferase (AST/SGOT): 26 (12-16 @ 05:25)        12-16    132<L>  |  93<L>  |  49<H>  ----------------------------<  89  4.0   |  24  |  5.34<H>    Ca    9.8      16 Dec 2021 05:25  Phos  4.2     12-15  Mg     2.0     12-15    TPro  7.5  /  Alb  2.3<L>  /  TBili  0.5  /  DBili  x   /  AST  26  /  ALT  <5<L>  /  AlkPhos  83  12-16                        8.5    8.14  )-----------( 225      ( 16 Dec 2021 05:25 )             27.7     Medications  MEDICATIONS  (STANDING):  atorvastatin 80 milliGRAM(s) Oral at bedtime  carbidopa/levodopa  25/100 2.5 Tablet(s) Oral <User Schedule>  carbidopa/levodopa  25/100 2 Tablet(s) Oral <User Schedule>  chlorhexidine 4% Liquid 1 Application(s) Topical <User Schedule>  cholecalciferol 1000 Unit(s) Oral daily  dextrose 40% Gel 15 Gram(s) Oral once  dextrose 5%. 1000 milliLiter(s) (50 mL/Hr) IV Continuous <Continuous>  dextrose 5%. 1000 milliLiter(s) (100 mL/Hr) IV Continuous <Continuous>  dextrose 50% Injectable 25 Gram(s) IV Push once  dextrose 50% Injectable 12.5 Gram(s) IV Push once  dextrose 50% Injectable 25 Gram(s) IV Push once  diVALproex Sprinkle 250 milliGRAM(s) Oral three times a day  DULoxetine 20 milliGRAM(s) Oral daily  folic acid 1 milliGRAM(s) Oral daily  glucagon  Injectable 1 milliGRAM(s) IntraMuscular once  insulin glargine Injectable (LANTUS) 8 Unit(s) SubCutaneous at bedtime  insulin lispro (ADMELOG) corrective regimen sliding scale   SubCutaneous three times a day before meals  insulin lispro (ADMELOG) corrective regimen sliding scale   SubCutaneous at bedtime  levothyroxine 50 MICROGram(s) Oral daily  memantine 5 milliGRAM(s) Oral at bedtime  metoprolol tartrate 50 milliGRAM(s) Oral two times a day  midodrine 10 milliGRAM(s) Oral <User Schedule>  Nephro-celeste 1 Tablet(s) Oral daily  polyethylene glycol 3350 17 Gram(s) Oral two times a day  QUEtiapine 25 milliGRAM(s) Oral three times a day  senna Syrup 10 milliLiter(s) Oral at bedtime  trihexyphenidyl 2 milliGRAM(s) Oral three times a day      Physical Exam  General: Patient comfortable in bed  Vital Signs Last 12 Hrs  T(F): 97.5 (12-16-21 @ 12:26), Max: 98.9 (12-16-21 @ 04:04)  HR: 91 (12-16-21 @ 12:26) (87 - 96)  BP: 122/61 (12-16-21 @ 12:26) (122/61 - 135/80)  BP(mean): --  RR: 18 (12-16-21 @ 12:26) (18 - 18)  SpO2: 95% (12-16-21 @ 12:26) (91% - 96%)  Neck: No palpable thyroid nodules.  CVS: S1S2, No murmurs  Respiratory: No wheezing, no crepitations  GI: Abdomen soft, bowel sounds positive  Musculoskeletal:  edema lower extremities.   Skin: No skin rashes, no ecchymosis    Diagnostics    Free Thyroxine, Serum: AM Sched. Collection: 16-Dec-2021 06:00 (12-15 @ 14:39)  Free Thyroxine, Serum: AM Sched. Collection: 08-Dec-2021 06:00 (12-07 @ 14:26)             Chief complaint  Patient is a 71y old  Male who presents with a chief complaint of leg swelling (16 Dec 2021 14:33)   Review of systems  Patient in bed, looks comfortable, no new hypoglycemic episodes.    Labs and Fingersticks  CAPILLARY BLOOD GLUCOSE        POCT Blood Glucose.: 145 mg/dL (16 Dec 2021 12:46)  POCT Blood Glucose.: 109 mg/dL (16 Dec 2021 07:41)  POCT Blood Glucose.: 121 mg/dL (15 Dec 2021 22:09)  POCT Blood Glucose.: 181 mg/dL (15 Dec 2021 16:46)      Anion Gap, Serum: 15 (12-16 @ 05:25)  Anion Gap, Serum: 14 (12-15 @ 05:22)      Calcium, Total Serum: 9.8 (12-16 @ 05:25)  Calcium, Total Serum: 9.9 (12-15 @ 05:22)  Albumin, Serum: 2.3 *L* (12-16 @ 05:25)    Alanine Aminotransferase (ALT/SGPT): <5 *L* (12-16 @ 05:25)  Alkaline Phosphatase, Serum: 83 (12-16 @ 05:25)  Aspartate Aminotransferase (AST/SGOT): 26 (12-16 @ 05:25)        12-16    132<L>  |  93<L>  |  49<H>  ----------------------------<  89  4.0   |  24  |  5.34<H>    Ca    9.8      16 Dec 2021 05:25  Phos  4.2     12-15  Mg     2.0     12-15    TPro  7.5  /  Alb  2.3<L>  /  TBili  0.5  /  DBili  x   /  AST  26  /  ALT  <5<L>  /  AlkPhos  83  12-16                        8.5    8.14  )-----------( 225      ( 16 Dec 2021 05:25 )             27.7     Medications  MEDICATIONS  (STANDING):  atorvastatin 80 milliGRAM(s) Oral at bedtime  carbidopa/levodopa  25/100 2.5 Tablet(s) Oral <User Schedule>  carbidopa/levodopa  25/100 2 Tablet(s) Oral <User Schedule>  chlorhexidine 4% Liquid 1 Application(s) Topical <User Schedule>  cholecalciferol 1000 Unit(s) Oral daily  dextrose 40% Gel 15 Gram(s) Oral once  dextrose 5%. 1000 milliLiter(s) (50 mL/Hr) IV Continuous <Continuous>  dextrose 5%. 1000 milliLiter(s) (100 mL/Hr) IV Continuous <Continuous>  dextrose 50% Injectable 25 Gram(s) IV Push once  dextrose 50% Injectable 12.5 Gram(s) IV Push once  dextrose 50% Injectable 25 Gram(s) IV Push once  diVALproex Sprinkle 250 milliGRAM(s) Oral three times a day  DULoxetine 20 milliGRAM(s) Oral daily  folic acid 1 milliGRAM(s) Oral daily  glucagon  Injectable 1 milliGRAM(s) IntraMuscular once  insulin glargine Injectable (LANTUS) 8 Unit(s) SubCutaneous at bedtime  insulin lispro (ADMELOG) corrective regimen sliding scale   SubCutaneous three times a day before meals  insulin lispro (ADMELOG) corrective regimen sliding scale   SubCutaneous at bedtime  levothyroxine 50 MICROGram(s) Oral daily  memantine 5 milliGRAM(s) Oral at bedtime  metoprolol tartrate 50 milliGRAM(s) Oral two times a day  midodrine 10 milliGRAM(s) Oral <User Schedule>  Nephro-celeste 1 Tablet(s) Oral daily  polyethylene glycol 3350 17 Gram(s) Oral two times a day  QUEtiapine 25 milliGRAM(s) Oral three times a day  senna Syrup 10 milliLiter(s) Oral at bedtime  trihexyphenidyl 2 milliGRAM(s) Oral three times a day      Physical Exam  General: Patient comfortable in bed  Vital Signs Last 12 Hrs  T(F): 97.5 (12-16-21 @ 12:26), Max: 98.9 (12-16-21 @ 04:04)  HR: 91 (12-16-21 @ 12:26) (87 - 96)  BP: 122/61 (12-16-21 @ 12:26) (122/61 - 135/80)  BP(mean): --  RR: 18 (12-16-21 @ 12:26) (18 - 18)  SpO2: 95% (12-16-21 @ 12:26) (91% - 96%)  Neck: No palpable thyroid nodules.  CVS: S1S2, No murmurs  Respiratory: No wheezing, no crepitations  GI: Abdomen soft, bowel sounds positive  Musculoskeletal:  edema lower extremities.   Skin: No skin rashes, no ecchymosis    Diagnostics    Free Thyroxine, Serum: AM Sched. Collection: 16-Dec-2021 06:00 (12-15 @ 14:39)  Free Thyroxine, Serum: AM Sched. Collection: 08-Dec-2021 06:00 (12-07 @ 14:26)

## 2021-12-16 NOTE — PROGRESS NOTE ADULT - ASSESSMENT
70yo M w/ PMHx of CAD (s/p CABG 2019), CKD (unknown stage), DM2, Parkinson's Disease, HTN, depression presents with bilateral leg swelling  ESRD   Severe LV dysfunction; A flutter   Confusion -       1 Palliation - Intermittent confusion   2 Renal-   HD  with epo in am   3 CVS- BP controlled at present, on Midodrine with parameters, Lopressor for heart rate control   4 Anemia -  Retacrit 10k units with  HD   5 Vasc - s/p  LUE AVF--Immature ;  Perm cath     Family not interested in stopping hd       Sayed Zumbl   [phone]

## 2021-12-17 NOTE — PROGRESS NOTE ADULT - ASSESSMENT
Assessment  DMT2: 71y Male with DM T2 with hyperglycemia, A1C 6.4%, was on insulin at home, now on basal insulin and coverage, blood sugars are stable and trending within acceptable range, no new hypoglycemias, eating partial meal, appears comfortable, in NAD, no acute events.  Hypothyroidism: Patient has no history thyroid disease, was not on any thyroid supplements, subclinical with low-normal FT4, lethargic, started on synthroid 25 mcg po daily, repeat FT4 trending down, increased to synthroid 50 mcg po daily, FT4 improving.  CHF: on medications, stable, monitored.  HTN: Controlled,  on antihypertensive medications.  Parkinsons: on meds, monitored.  CKD: Monitor labs/BMP      Kelsie Reece MD  Cell: [phone]  Office: [phone]     Assessment  DMT2: 71y Male with DM T2 with hyperglycemia, A1C 6.4%, was on insulin at home, now on basal insulin and coverage, blood sugars are stable and trending within acceptable range, no new hypoglycemias, eating partial meal, appears  comfortable, in NAD, no acute events.  Hypothyroidism: Patient has no history thyroid disease, was not on any thyroid supplements, subclinical with low-normal FT4, lethargic, started on synthroid 25 mcg po daily, repeat FT4 trending down, increased to synthroid 50 mcg po daily, FT4 improving.  CHF: on medications, stable, monitored.  HTN: Controlled,  on antihypertensive medications.  Parkinsons: on meds, monitored.  CKD: Monitor labs/BMP      Kelsie Reece MD  Cell: [phone]  Office: [phone]

## 2021-12-17 NOTE — PROGRESS NOTE ADULT - ASSESSMENT
71M s/p L brachiocephalic AVF creation 11/12/21    Recommendation:  - Please obtain HD duplex to evaluate AVF maturity  - please page w questions    Vascular surgery  [id number]

## 2021-12-17 NOTE — PROGRESS NOTE ADULT - ASSESSMENT
72yo M w/ PMHx of CAD (s/p CABG 2019), CKD (unknown stage), DM2, Parkinson's Disease, HTN, depression presents with new onset acute heart failure exacerbation, NSTEMI, started on hep gtt, developed bl RP bleed, acute anemia. sp MICU course for hemorrhagic shock, 10/28 sp IR embolization l4 and l5 lumbar artery. sp permacath and AVF.    # Acute systolic and diastolic heart failure  # NSTEMI, medical management  # ESRD, new HD  # Atrial flutter  # acute hypoxic resp failure  # hemorrhagic shock, left RP hematoma, acute blood loss anemia sp embolization l4 and l5 lumbar artery  # lupus anticoagulant +  TTE mod to severe LV SD, hypokinesis anterior wall  10/28 sp IR embolization l4 and l5 lumbar artery, h/h stable  HD via permacath per renal, midodrine during dialysis  sp L AVF   12/13 pt pulled out permacath  IR to replace today  please ask vascular fu to see if AVF mature?    # Parkinsons disease   # delirium  cont on depakote seroquel and cymbalta  c/w trihexyphenidyl, carbidopa/levodopa   per neuro, probable early lewy body dementia related paranoia and agitation  psych fu    # DM2 (diabetes mellitus, type 2)  per endo  hba1c 6.4    # CAD (coronary artery disease)  # HTN  lopressor 50mg bid  c/w atorvastatin  asa on hold    PCP Dr. Simons    DNR/DNI    dw son Branden over the phone today  family still not ready to stop dialysis  vascular and psych fu    [facility]  [phone]

## 2021-12-17 NOTE — PROGRESS NOTE ADULT - SUBJECTIVE AND OBJECTIVE BOX
Chief complaint  Patient is a 71y old  Male who presents with a chief complaint of leg swelling (17 Dec 2021 12:26)   Review of systems  Patient awake in bed, looks comfortable, no hypoglycemic episodes.    Labs and Fingersticks  CAPILLARY BLOOD GLUCOSE      POCT Blood Glucose.: 118 mg/dL (17 Dec 2021 08:34)  POCT Blood Glucose.: 132 mg/dL (16 Dec 2021 21:06)  POCT Blood Glucose.: 139 mg/dL (16 Dec 2021 16:22)  POCT Blood Glucose.: 145 mg/dL (16 Dec 2021 12:46)      Anion Gap, Serum: 13 (12-17 @ 06:15)  Anion Gap, Serum: 15 (12-16 @ 05:25)      Calcium, Total Serum: 10.6 *H* (12-17 @ 06:15)  Calcium, Total Serum: 9.8 (12-16 @ 05:25)  Albumin, Serum: 2.3 *L* (12-17 @ 06:15)  Albumin, Serum: 2.3 *L* (12-16 @ 05:25)    Alanine Aminotransferase (ALT/SGPT): 7 *L* (12-17 @ 06:15)  Alanine Aminotransferase (ALT/SGPT): <5 *L* (12-16 @ 05:25)  Alkaline Phosphatase, Serum: 92 (12-17 @ 06:15)  Alkaline Phosphatase, Serum: 83 (12-16 @ 05:25)  Aspartate Aminotransferase (AST/SGOT): 30 (12-17 @ 06:15)  Aspartate Aminotransferase (AST/SGOT): 26 (12-16 @ 05:25)        12-17    135  |  97  |  30<H>  ----------------------------<  118<H>  4.7   |  25  |  4.15<H>    Ca    10.6<H>      17 Dec 2021 06:15  Mg     2.2     12-17    TPro  8.3  /  Alb  2.3<L>  /  TBili  0.6  /  DBili  x   /  AST  30  /  ALT  7<L>  /  AlkPhos  92  12-17                        9.1    9.14  )-----------( 259      ( 17 Dec 2021 06:15 )             31.0     Medications  MEDICATIONS  (STANDING):  atorvastatin 80 milliGRAM(s) Oral at bedtime  carbidopa/levodopa  25/100 2.5 Tablet(s) Oral <User Schedule>  carbidopa/levodopa  25/100 2 Tablet(s) Oral <User Schedule>  chlorhexidine 4% Liquid 1 Application(s) Topical <User Schedule>  cholecalciferol 1000 Unit(s) Oral daily  dextrose 40% Gel 15 Gram(s) Oral once  dextrose 5%. 1000 milliLiter(s) (50 mL/Hr) IV Continuous <Continuous>  dextrose 5%. 1000 milliLiter(s) (100 mL/Hr) IV Continuous <Continuous>  dextrose 50% Injectable 25 Gram(s) IV Push once  dextrose 50% Injectable 12.5 Gram(s) IV Push once  dextrose 50% Injectable 25 Gram(s) IV Push once  diVALproex Sprinkle 250 milliGRAM(s) Oral three times a day  DULoxetine 20 milliGRAM(s) Oral daily  folic acid 1 milliGRAM(s) Oral daily  glucagon  Injectable 1 milliGRAM(s) IntraMuscular once  insulin glargine Injectable (LANTUS) 8 Unit(s) SubCutaneous at bedtime  insulin lispro (ADMELOG) corrective regimen sliding scale   SubCutaneous three times a day before meals  insulin lispro (ADMELOG) corrective regimen sliding scale   SubCutaneous at bedtime  levothyroxine 50 MICROGram(s) Oral daily  memantine 5 milliGRAM(s) Oral at bedtime  metoprolol tartrate 50 milliGRAM(s) Oral two times a day  midodrine 10 milliGRAM(s) Oral <User Schedule>  Nephro-celeste 1 Tablet(s) Oral daily  polyethylene glycol 3350 17 Gram(s) Oral two times a day  QUEtiapine 25 milliGRAM(s) Oral three times a day  senna Syrup 10 milliLiter(s) Oral at bedtime  trihexyphenidyl 2 milliGRAM(s) Oral three times a day      Physical Exam  General: Patient comfortable in bed  Vital Signs Last 12 Hrs  T(F): 97.9 (12-17-21 @ 12:19), Max: 97.9 (12-17-21 @ 12:19)  HR: 89 (12-17-21 @ 12:19) (88 - 89)  BP: 137/69 (12-17-21 @ 12:19) (137/69 - 146/81)  BP(mean): --  RR: 18 (12-17-21 @ 12:19) (18 - 18)  SpO2: 98% (12-17-21 @ 12:19) (98% - 99%)  Neck: No palpable thyroid nodules.  CVS: S1S2, No murmurs  Respiratory: No wheezing, no crepitations  GI: Abdomen soft, bowel sounds positive  Musculoskeletal:  edema lower extremities.   Skin: No skin rashes, no ecchymosis    Diagnostics    Free Thyroxine, Serum: AM Sched. Collection: 16-Dec-2021 06:00 (12-15 @ 14:39)  Free Thyroxine, Serum: AM Sched. Collection: 08-Dec-2021 06:00 (12-07 @ 14:26)         Chief complaint  Patient is a 71y old  Male who presents with a chief complaint of leg swelling (17 Dec 2021 12:26)   Review of systems  Patient awake in bed, looks comfortable, no hypoglycemic episodes.    Labs and Fingersticks  CAPILLARY BLOOD GLUCOSE      POCT Blood Glucose.: 118 mg/dL (17 Dec 2021 08:34)  POCT Blood Glucose.: 132 mg/dL (16 Dec 2021 21:06)  POCT Blood Glucose.: 139 mg/dL (16 Dec 2021 16:22)  POCT Blood Glucose.: 145 mg/dL (16 Dec 2021 12:46)      Anion Gap, Serum: 13 (12-17 @ 06:15)  Anion Gap, Serum: 15 (12-16 @ 05:25)      Calcium, Total Serum: 10.6 *H* (12-17 @ 06:15)  Calcium, Total Serum: 9.8 (12-16 @ 05:25)  Albumin, Serum: 2.3 *L* (12-17 @ 06:15)  Albumin, Serum: 2.3 *L* (12-16 @ 05:25)    Alanine Aminotransferase (ALT/SGPT): 7 *L* (12-17 @ 06:15)  Alanine Aminotransferase (ALT/SGPT): <5 *L* (12-16 @ 05:25)  Alkaline Phosphatase, Serum: 92 (12-17 @ 06:15)  Alkaline Phosphatase, Serum: 83 (12-16 @ 05:25)  Aspartate Aminotransferase (AST/SGOT): 30 (12-17 @ 06:15)  Aspartate Aminotransferase (AST/SGOT): 26 (12-16 @ 05:25)        12-17    135  |  97  |  30<H>  ----------------------------<  118<H>  4.7   |  25  |  4.15<H>    Ca    10.6<H>      17 Dec 2021 06:15  Mg     2.2     12-17    TPro  8.3  /  Alb  2.3<L>  /  TBili  0.6  /  DBili  x   /  AST  30  /  ALT  7<L>  /  AlkPhos  92  12-17                        9.1    9.14  )-----------( 259      ( 17 Dec 2021 06:15 )             31.0     Medications  MEDICATIONS  (STANDING):  atorvastatin 80 milliGRAM(s) Oral at bedtime  carbidopa/levodopa  25/100 2.5 Tablet(s) Oral <User Schedule>  carbidopa/levodopa  25/100 2 Tablet(s) Oral <User Schedule>  chlorhexidine 4% Liquid 1 Application(s) Topical <User Schedule>  cholecalciferol 1000 Unit(s) Oral daily  dextrose 40% Gel 15 Gram(s) Oral once  dextrose 5%. 1000 milliLiter(s) (50 mL/Hr) IV Continuous <Continuous>  dextrose 5%. 1000 milliLiter(s) (100 mL/Hr) IV Continuous <Continuous>  dextrose 50% Injectable 25 Gram(s) IV Push once  dextrose 50% Injectable 12.5 Gram(s) IV Push once  dextrose 50% Injectable 25 Gram(s) IV Push once  diVALproex Sprinkle 250 milliGRAM(s) Oral three times a day  DULoxetine 20 milliGRAM(s) Oral daily  folic acid 1 milliGRAM(s) Oral daily  glucagon  Injectable 1 milliGRAM(s) IntraMuscular once  insulin glargine Injectable (LANTUS) 8 Unit(s) SubCutaneous at bedtime  insulin lispro (ADMELOG) corrective regimen sliding scale   SubCutaneous three times a day before meals  insulin lispro (ADMELOG) corrective regimen sliding scale   SubCutaneous at bedtime  levothyroxine 50 MICROGram(s) Oral daily  memantine 5 milliGRAM(s) Oral at bedtime  metoprolol tartrate 50 milliGRAM(s) Oral two times a day  midodrine 10 milliGRAM(s) Oral <User Schedule>  Nephro-celeste 1 Tablet(s) Oral daily  polyethylene glycol 3350 17 Gram(s) Oral two times a day  QUEtiapine 25 milliGRAM(s) Oral three times a day  senna Syrup 10 milliLiter(s) Oral at bedtime  trihexyphenidyl 2 milliGRAM(s) Oral three times a day      Physical Exam  General: Patient comfortable in bed  Vital Signs Last 12 Hrs  T(F): 97.9 (12-17-21 @ 12:19), Max: 97.9 (12-17-21 @ 12:19)  HR: 89 (12-17-21 @ 12:19) (88 - 89)  BP: 137/69 (12-17-21 @ 12:19) (137/69 - 146/81)  BP(mean): --  RR: 18 (12-17-21 @ 12:19) (18 - 18)  SpO2: 98% (12-17-21 @ 12:19) (98% - 99%)  Neck: No palpable thyroid nodules.  CVS: S1S2, No murmurs  Respiratory: No wheezing, no crepitations  GI: Abdomen soft, bowel sounds positive  Musculoskeletal:  edema lower extremities.   Skin: No skin rashes, no ecchymosis    Diagnostics    Free Thyroxine, Serum: AM Sched. Collection: 16-Dec-2021 06:00 (12-15 @ 14:39)  Free Thyroxine, Serum: AM Sched. Collection: 08-Dec-2021 06:00 (12-07 @ 14:26)

## 2021-12-17 NOTE — PROGRESS NOTE ADULT - SUBJECTIVE AND OBJECTIVE BOX
[facility] [phone]        Patient seen and examined in bed.  He was the same         MEDICATIONS  (STANDING):  atorvastatin 80 milliGRAM(s) Oral at bedtime  carbidopa/levodopa  25/100 2.5 Tablet(s) Oral <User Schedule>  carbidopa/levodopa  25/100 2 Tablet(s) Oral <User Schedule>  chlorhexidine 4% Liquid 1 Application(s) Topical <User Schedule>  cholecalciferol 1000 Unit(s) Oral daily  dextrose 40% Gel 15 Gram(s) Oral once  dextrose 5%. 1000 milliLiter(s) (50 mL/Hr) IV Continuous <Continuous>  dextrose 5%. 1000 milliLiter(s) (100 mL/Hr) IV Continuous <Continuous>  dextrose 50% Injectable 25 Gram(s) IV Push once  dextrose 50% Injectable 12.5 Gram(s) IV Push once  dextrose 50% Injectable 25 Gram(s) IV Push once  diVALproex Sprinkle 250 milliGRAM(s) Oral three times a day  DULoxetine 20 milliGRAM(s) Oral daily  folic acid 1 milliGRAM(s) Oral daily  glucagon  Injectable 1 milliGRAM(s) IntraMuscular once  insulin glargine Injectable (LANTUS) 8 Unit(s) SubCutaneous at bedtime  insulin lispro (ADMELOG) corrective regimen sliding scale   SubCutaneous three times a day before meals  insulin lispro (ADMELOG) corrective regimen sliding scale   SubCutaneous at bedtime  levothyroxine 50 MICROGram(s) Oral daily  memantine 5 milliGRAM(s) Oral at bedtime  metoprolol tartrate 50 milliGRAM(s) Oral two times a day  midodrine 10 milliGRAM(s) Oral <User Schedule>  Nephro-celeste 1 Tablet(s) Oral daily  polyethylene glycol 3350 17 Gram(s) Oral two times a day  QUEtiapine 25 milliGRAM(s) Oral three times a day  senna Syrup 10 milliLiter(s) Oral at bedtime  trihexyphenidyl 2 milliGRAM(s) Oral three times a day      VITAL:  T(C): , Max: 37 (12-16-21 @ 20:24)  T(F): , Max: 98.6 (12-16-21 @ 20:24)  HR: 88 (12-17-21 @ 04:15)  BP: 146/81 (12-17-21 @ 04:15)  BP(mean): --  RR: 18 (12-17-21 @ 04:15)  SpO2: 99% (12-17-21 @ 04:15)  Wt(kg): --    I and O's:    12-16 @ 07:01  -  12-17 @ 07:00  --------------------------------------------------------  IN: 890 mL / OUT: 1850 mL / NET: -960 mL          PHYSICAL EXAM:    Constitutional: NAD  Neck:  No JVD  Respiratory: CTAB/L  Cardiovascular: S1 and S2  Gastrointestinal: BS+, soft, NT/ND  Extremities: No peripheral edema  Neurological: A/O x 3, no focal deficits  Psychiatric: Normal mood, normal affect  : No Javier  Skin: No rashes  Access: perm cath and avf     LABS:                        9.1    9.14  )-----------( 259      ( 17 Dec 2021 06:15 )             31.0     12-17    135  |  97  |  30<H>  ----------------------------<  118<H>  4.7   |  25  |  4.15<H>    Ca    10.6<H>      17 Dec 2021 06:15  Mg     2.2     12-17    TPro  8.3  /  Alb  2.3<L>  /  TBili  0.6  /  DBili  x   /  AST  30  /  ALT  7<L>  /  AlkPhos  92  12-17          Urine Studies:          RADIOLOGY & ADDITIONAL STUDIES:

## 2021-12-17 NOTE — PROGRESS NOTE ADULT - SUBJECTIVE AND OBJECTIVE BOX
VASCULAR SURGERY DAILY PROGRESS NOTE:    Subjective:  No complaint at this time. Denies pain in L arm    Vital Signs Last 24 Hrs  T(C): 36.6 (17 Dec 2021 12:19), Max: 37 (16 Dec 2021 20:24)  T(F): 97.9 (17 Dec 2021 12:19), Max: 98.6 (16 Dec 2021 20:24)  HR: 89 (17 Dec 2021 12:19) (87 - 89)  BP: 137/69 (17 Dec 2021 12:19) (128/77 - 146/81)  BP(mean): --  RR: 18 (17 Dec 2021 12:19) (18 - 18)  SpO2: 98% (17 Dec 2021 12:19) (98% - 100%)    Exam:  Gen: NAD, resting in bed, alert and responding appropriately  Resp: Airway patent, non-labored respirations  L AVF incision c/d/i; + thrill; palpable radial pulse    I&O's Detail    16 Dec 2021 07:01  -  17 Dec 2021 07:00  --------------------------------------------------------  IN:    Oral Fluid: 90 mL    Other (mL): 800 mL  Total IN: 890 mL    OUT:    Other (mL): 1800 mL    Voided (mL): 50 mL  Total OUT: 1850 mL    Total NET: -960 mL          Daily     Daily     MEDICATIONS  (STANDING):  atorvastatin 80 milliGRAM(s) Oral at bedtime  carbidopa/levodopa  25/100 2.5 Tablet(s) Oral <User Schedule>  carbidopa/levodopa  25/100 2 Tablet(s) Oral <User Schedule>  chlorhexidine 4% Liquid 1 Application(s) Topical <User Schedule>  cholecalciferol 1000 Unit(s) Oral daily  dextrose 40% Gel 15 Gram(s) Oral once  dextrose 5%. 1000 milliLiter(s) (50 mL/Hr) IV Continuous <Continuous>  dextrose 5%. 1000 milliLiter(s) (100 mL/Hr) IV Continuous <Continuous>  dextrose 50% Injectable 25 Gram(s) IV Push once  dextrose 50% Injectable 12.5 Gram(s) IV Push once  dextrose 50% Injectable 25 Gram(s) IV Push once  diVALproex Sprinkle 250 milliGRAM(s) Oral three times a day  DULoxetine 20 milliGRAM(s) Oral daily  folic acid 1 milliGRAM(s) Oral daily  glucagon  Injectable 1 milliGRAM(s) IntraMuscular once  insulin glargine Injectable (LANTUS) 8 Unit(s) SubCutaneous at bedtime  insulin lispro (ADMELOG) corrective regimen sliding scale   SubCutaneous three times a day before meals  insulin lispro (ADMELOG) corrective regimen sliding scale   SubCutaneous at bedtime  levothyroxine 50 MICROGram(s) Oral daily  memantine 5 milliGRAM(s) Oral at bedtime  metoprolol tartrate 50 milliGRAM(s) Oral two times a day  midodrine 10 milliGRAM(s) Oral <User Schedule>  Nephro-celeste 1 Tablet(s) Oral daily  polyethylene glycol 3350 17 Gram(s) Oral two times a day  QUEtiapine 25 milliGRAM(s) Oral three times a day  senna Syrup 10 milliLiter(s) Oral at bedtime  trihexyphenidyl 2 milliGRAM(s) Oral three times a day    MEDICATIONS  (PRN):  acetaminophen     Tablet .. 650 milliGRAM(s) Oral every 6 hours PRN Mild Pain (1 - 3)  albuterol/ipratropium for Nebulization 3 milliLiter(s) Nebulizer every 6 hours PRN Shortness of Breath and/or Wheezing  diVALproex Sprinkle 125 milliGRAM(s) Oral every 8 hours PRN Agitation  guaiFENesin Oral Liquid (Sugar-Free) 200 milliGRAM(s) Oral every 6 hours PRN Cough  HYDROmorphone  Injectable 0.25 milliGRAM(s) IV Push every 10 minutes PRN Moderate Pain (4 - 6)  LORazepam     Tablet 0.25 milliGRAM(s) Oral every 8 hours PRN Agitation (severe agitation ONLY)  ondansetron Injectable 4 milliGRAM(s) IV Push once PRN Nausea and/or Vomiting  QUEtiapine 12.5 milliGRAM(s) Oral every 6 hours PRN agitation  sodium chloride 0.9% lock flush 10 milliLiter(s) IV Push every 1 hour PRN Pre/post blood products, medications, blood draw, and to maintain line patency      LABS:                        9.1    9.14  )-----------( 259      ( 17 Dec 2021 06:15 )             31.0     12-17    135  |  97  |  30<H>  ----------------------------<  118<H>  4.7   |  25  |  4.15<H>    Ca    10.6<H>      17 Dec 2021 06:15  Mg     2.2     12-17    TPro  8.3  /  Alb  2.3<L>  /  TBili  0.6  /  DBili  x   /  AST  30  /  ALT  7<L>  /  AlkPhos  92  12-17

## 2021-12-17 NOTE — PROGRESS NOTE ADULT - ASSESSMENT
72yo M w/ PMHx of CAD (s/p CABG 2019), CKD (unknown stage), DM2, Parkinson's Disease, HTN, depression presents with bilateral leg swelling  ESRD   Severe LV dysfunction; A flutter   Confusion -       1 Palliation - Intermittent confusion   2 Renal-   HD  with epo in am   3 CVS- BP controlled at present, on Midodrine with parameters, Lopressor for heart rate control   4 Anemia -  Retacrit 10k units with  HD   5 Vasc - s/p  LUE AVF--Immature ;  Perm cath     Family not interested in stopping hd       Sayed [email or web address]   [phone]

## 2021-12-17 NOTE — PROGRESS NOTE ADULT - SUBJECTIVE AND OBJECTIVE BOX
Patient is a 71y old  Male who presents with a chief complaint of leg swelling (17 Dec 2021 13:26)      INTERVAL HPI/OVERNIGHT EVENTS: noted  pt seen and examined this am   events noted  feels well  calm and cooperative      Vital Signs Last 24 Hrs  T(C): 36.6 (17 Dec 2021 12:19), Max: 37 (16 Dec 2021 20:24)  T(F): 97.9 (17 Dec 2021 12:19), Max: 98.6 (16 Dec 2021 20:24)  HR: 89 (17 Dec 2021 12:19) (87 - 89)  BP: 137/69 (17 Dec 2021 12:19) (128/77 - 146/81)  BP(mean): --  RR: 18 (17 Dec 2021 12:19) (18 - 18)  SpO2: 98% (17 Dec 2021 12:19) (98% - 100%)    acetaminophen     Tablet .. 650 milliGRAM(s) Oral every 6 hours PRN  albuterol/ipratropium for Nebulization 3 milliLiter(s) Nebulizer every 6 hours PRN  atorvastatin 80 milliGRAM(s) Oral at bedtime  carbidopa/levodopa  25/100 2.5 Tablet(s) Oral <User Schedule>  carbidopa/levodopa  25/100 2 Tablet(s) Oral <User Schedule>  chlorhexidine 4% Liquid 1 Application(s) Topical <User Schedule>  cholecalciferol 1000 Unit(s) Oral daily  dextrose 40% Gel 15 Gram(s) Oral once  dextrose 5%. 1000 milliLiter(s) IV Continuous <Continuous>  dextrose 5%. 1000 milliLiter(s) IV Continuous <Continuous>  dextrose 50% Injectable 25 Gram(s) IV Push once  dextrose 50% Injectable 12.5 Gram(s) IV Push once  dextrose 50% Injectable 25 Gram(s) IV Push once  diVALproex Sprinkle 125 milliGRAM(s) Oral every 8 hours PRN  diVALproex Sprinkle 250 milliGRAM(s) Oral three times a day  DULoxetine 20 milliGRAM(s) Oral daily  folic acid 1 milliGRAM(s) Oral daily  glucagon  Injectable 1 milliGRAM(s) IntraMuscular once  guaiFENesin Oral Liquid (Sugar-Free) 200 milliGRAM(s) Oral every 6 hours PRN  HYDROmorphone  Injectable 0.25 milliGRAM(s) IV Push every 10 minutes PRN  insulin glargine Injectable (LANTUS) 8 Unit(s) SubCutaneous at bedtime  insulin lispro (ADMELOG) corrective regimen sliding scale   SubCutaneous three times a day before meals  insulin lispro (ADMELOG) corrective regimen sliding scale   SubCutaneous at bedtime  levothyroxine 50 MICROGram(s) Oral daily  LORazepam     Tablet 0.25 milliGRAM(s) Oral every 8 hours PRN  memantine 5 milliGRAM(s) Oral at bedtime  metoprolol tartrate 50 milliGRAM(s) Oral two times a day  midodrine 10 milliGRAM(s) Oral <User Schedule>  Nephro-celeste 1 Tablet(s) Oral daily  ondansetron Injectable 4 milliGRAM(s) IV Push once PRN  polyethylene glycol 3350 17 Gram(s) Oral two times a day  QUEtiapine 25 milliGRAM(s) Oral three times a day  QUEtiapine 12.5 milliGRAM(s) Oral every 6 hours PRN  senna Syrup 10 milliLiter(s) Oral at bedtime  sodium chloride 0.9% lock flush 10 milliLiter(s) IV Push every 1 hour PRN  trihexyphenidyl 2 milliGRAM(s) Oral three times a day      PHYSICAL EXAM:  GENERAL: NAD,   EYES: conjunctiva and sclera clear  ENMT: Moist mucous membranes  NECK: Supple, No JVD, Normal thyroid  CHEST/LUNG: non labored, cta b/l  HEART: Regular rate and rhythm; No murmurs, rubs, or gallops  ABDOMEN: Soft, Nontender, Nondistended; Bowel sounds present  EXTREMITIES:  2+ Peripheral Pulses, No clubbing, cyanosis, or edema  LYMPH: No lymphadenopathy noted  SKIN: No rashes or lesions    Consultant(s) Notes Reviewed:  [x ] YES  [ ] NO  Care Discussed with Consultants/Other Providers [ x] YES  [ ] NO    LABS:                        9.1    9.14  )-----------( 259      ( 17 Dec 2021 06:15 )             31.0     12-17    135  |  97  |  30<H>  ----------------------------<  118<H>  4.7   |  25  |  4.15<H>    Ca    10.6<H>      17 Dec 2021 06:15  Mg     2.2     12-17    TPro  8.3  /  Alb  2.3<L>  /  TBili  0.6  /  DBili  x   /  AST  30  /  ALT  7<L>  /  AlkPhos  92  12-17        CAPILLARY BLOOD GLUCOSE      POCT Blood Glucose.: 169 mg/dL (17 Dec 2021 12:57)  POCT Blood Glucose.: 118 mg/dL (17 Dec 2021 08:34)  POCT Blood Glucose.: 132 mg/dL (16 Dec 2021 21:06)  POCT Blood Glucose.: 139 mg/dL (16 Dec 2021 16:22)              RADIOLOGY & ADDITIONAL TESTS:    Imaging Personally Reviewed:  [x ] YES  [ ] NO

## 2021-12-18 NOTE — PROGRESS NOTE ADULT - ASSESSMENT
Assessment  DMT2: 71y Male with DM T2 with hyperglycemia, A1C 6.4%, was on insulin at home, now on basal insulin and coverage, blood sugars are stable and trending within acceptable range, no new hypoglycemias, eating partial meals, no acute events.  Hypothyroidism: Patient has no history thyroid disease, was not on any thyroid supplements, subclinical with low-normal FT4, lethargic,  started on synthroid 25 mcg po daily, repeat FT4 trending down, increased to synthroid 50 mcg po daily, FT4 improving.  CHF: on medications, stable, monitored.  HTN: Controlled,  on antihypertensive medications.  Parkinsons: on meds, monitored.  CKD: Monitor labs/BMP      Kelsie Reece MD  Cell: [phone]  Office: [phone]

## 2021-12-18 NOTE — PROGRESS NOTE ADULT - SUBJECTIVE AND OBJECTIVE BOX
VASCULAR SURGERY PROGRESS NOTE   ___________________________________________________________________    ORLIN HARDIN | 71y Male   [facility] [address]   1950 | [id number]     LOS 58d    Attending: Marika Pineda    ___________________________________________________________________      SUBJECTIVE:   Patient seen today during morning rounds at bedside and found to be without acute distress. Denies chest pain, fever, severe pain, or SOB.     Allergies: adhesives (Rash)  latex (Urticaria)   NKDA    OBJECTIVE:  Vitals:    T(C): 36.6 (21 @ 05:24), Max: 36.6 (21 @ 12:19)  HR: 101 (21 @ 05:24) (81 - 101)  BP: 124/72 (21 @ 05:24) (111/69 - 155/85)  RR: 18 (21 @ 05:24) (18 - 19)  SpO2: 96% (21 @ 05:24) (94% - 98%)      OUT:    Voided (mL): 50 mL  Total OUT: 50 mL      OUT:    Voided (mL): 150 mL  Total OUT: 150 mL          Medications:  carbidopa/levodopa  25/100 2.5 Tablet(s) Oral <User Schedule>  carbidopa/levodopa  25/100 2 Tablet(s) Oral <User Schedule>  diVALproex Sprinkle 250 milliGRAM(s) Oral three times a day  DULoxetine 20 milliGRAM(s) Oral daily  epoetin mari-epbx (RETACRIT) Injectable 83841 Unit(s) IV Push once  memantine 5 milliGRAM(s) Oral at bedtime  metoprolol tartrate 50 milliGRAM(s) Oral two times a day  midodrine 10 milliGRAM(s) Oral <User Schedule>  polyethylene glycol 3350 17 Gram(s) Oral two times a day  QUEtiapine 25 milliGRAM(s) Oral three times a day  senna Syrup 10 milliLiter(s) Oral at bedtime  trihexyphenidyl 2 milliGRAM(s) Oral three times a day          Laboratory:  WBC: 9.14 H&H: 9.1/31.0 Plt: 259  WBC: 8.14 H&H: 8.5/27.7 Plt: 225    Chemistry:                               Phos: x  M.2  135  |  97  |  30  ----------------------------<  118  4.7   |  25  |  4.15                                       Phos: x  Mg: x   132  |  93  |  49  ----------------------------<  89  4.0   |  24  |  5.34               TPro 8.3 / Alb 2.3<L> / TBili 0.6 / DBili x  / AST/AST 30/7<L> / AlkPhos 92     TPro 7.5 / Alb 2.3<L> / TBili 0.5 / DBili x  / AST/AST 26/<5<L> / AlkPhos 83  PTT 49.7 PT/INR 12.8/1.07          Reviewed laboratory and imaging        COVID-19 PCR: NotDetec (13 Dec 2021 13:01)        Physical Exam:   Constitutional: resting in bed with no acute distress  Respiratory: unlabored breathing, clear respiration  Gastrointestinal: Abdomen soft, non distended, non-tender  Vascular:

## 2021-12-18 NOTE — PROGRESS NOTE ADULT - SUBJECTIVE AND OBJECTIVE BOX
Patient is a 71y old  Male who presents with a chief complaint of leg swelling (18 Dec 2021 11:17)      INTERVAL HPI/OVERNIGHT EVENTS: noted  pt seen and examined this am   events noted  feels well  calm adn cooperative      Vital Signs Last 24 Hrs  T(C): 36.4 (18 Dec 2021 08:15), Max: 36.6 (17 Dec 2021 12:19)  T(F): 97.5 (18 Dec 2021 08:15), Max: 97.9 (17 Dec 2021 12:19)  HR: 93 (18 Dec 2021 08:15) (81 - 101)  BP: 102/53 (18 Dec 2021 08:15) (102/53 - 155/85)  BP(mean): --  RR: 18 (18 Dec 2021 08:15) (18 - 19)  SpO2: 96% (18 Dec 2021 08:15) (94% - 98%)    acetaminophen     Tablet .. 650 milliGRAM(s) Oral every 6 hours PRN  albuterol/ipratropium for Nebulization 3 milliLiter(s) Nebulizer every 6 hours PRN  atorvastatin 80 milliGRAM(s) Oral at bedtime  carbidopa/levodopa  25/100 2.5 Tablet(s) Oral <User Schedule>  carbidopa/levodopa  25/100 2 Tablet(s) Oral <User Schedule>  chlorhexidine 4% Liquid 1 Application(s) Topical <User Schedule>  cholecalciferol 1000 Unit(s) Oral daily  dextrose 40% Gel 15 Gram(s) Oral once  dextrose 5%. 1000 milliLiter(s) IV Continuous <Continuous>  dextrose 5%. 1000 milliLiter(s) IV Continuous <Continuous>  dextrose 50% Injectable 25 Gram(s) IV Push once  dextrose 50% Injectable 12.5 Gram(s) IV Push once  dextrose 50% Injectable 25 Gram(s) IV Push once  diVALproex Sprinkle 125 milliGRAM(s) Oral every 8 hours PRN  diVALproex Sprinkle 250 milliGRAM(s) Oral three times a day  DULoxetine 20 milliGRAM(s) Oral daily  folic acid 1 milliGRAM(s) Oral daily  glucagon  Injectable 1 milliGRAM(s) IntraMuscular once  guaiFENesin Oral Liquid (Sugar-Free) 200 milliGRAM(s) Oral every 6 hours PRN  HYDROmorphone  Injectable 0.25 milliGRAM(s) IV Push every 10 minutes PRN  insulin glargine Injectable (LANTUS) 8 Unit(s) SubCutaneous at bedtime  insulin lispro (ADMELOG) corrective regimen sliding scale   SubCutaneous three times a day before meals  insulin lispro (ADMELOG) corrective regimen sliding scale   SubCutaneous at bedtime  levothyroxine 50 MICROGram(s) Oral daily  LORazepam     Tablet 0.25 milliGRAM(s) Oral every 8 hours PRN  memantine 5 milliGRAM(s) Oral at bedtime  metoprolol tartrate 50 milliGRAM(s) Oral two times a day  midodrine 10 milliGRAM(s) Oral <User Schedule>  Nephro-celeste 1 Tablet(s) Oral daily  ondansetron Injectable 4 milliGRAM(s) IV Push once PRN  polyethylene glycol 3350 17 Gram(s) Oral two times a day  QUEtiapine 25 milliGRAM(s) Oral three times a day  QUEtiapine 12.5 milliGRAM(s) Oral every 6 hours PRN  senna Syrup 10 milliLiter(s) Oral at bedtime  sodium chloride 0.9% lock flush 10 milliLiter(s) IV Push every 1 hour PRN  trihexyphenidyl 2 milliGRAM(s) Oral three times a day      PHYSICAL EXAM:  GENERAL: NAD,   EYES: conjunctiva and sclera clear  ENMT: Moist mucous membranes  NECK: Supple, No JVD, Normal thyroid  CHEST/LUNG: non labored, cta b/l  HEART: Regular rate and rhythm; No murmurs, rubs, or gallops  ABDOMEN: Soft, Nontender, Nondistended; Bowel sounds present  EXTREMITIES:  2+ Peripheral Pulses, No clubbing, cyanosis, or edema  LYMPH: No lymphadenopathy noted  SKIN: No rashes or lesions    Consultant(s) Notes Reviewed:  [x ] YES  [ ] NO  Care Discussed with Consultants/Other Providers [ x] YES  [ ] NO    LABS:                        9.1    10.18 )-----------( 246      ( 18 Dec 2021 07:22 )             30.1     12-18    134<L>  |  97  |  45<H>  ----------------------------<  109<H>  4.6   |  22  |  5.26<H>    Ca    10.6<H>      18 Dec 2021 07:21  Phos  5.8     12-18  Mg     2.1     12-18    TPro  8.3  /  Alb  2.3<L>  /  TBili  0.6  /  DBili  x   /  AST  30  /  ALT  7<L>  /  AlkPhos  92  12-17        CAPILLARY BLOOD GLUCOSE      POCT Blood Glucose.: 130 mg/dL (18 Dec 2021 07:24)  POCT Blood Glucose.: 132 mg/dL (17 Dec 2021 21:36)  POCT Blood Glucose.: 130 mg/dL (17 Dec 2021 17:29)  POCT Blood Glucose.: 169 mg/dL (17 Dec 2021 12:57)              RADIOLOGY & ADDITIONAL TESTS:    Imaging Personally Reviewed:  [x ] YES  [ ] NO

## 2021-12-18 NOTE — PROGRESS NOTE ADULT - ASSESSMENT
70yo M w/ PMHx of CAD (s/p CABG 2019), CKD (unknown stage), DM2, Parkinson's Disease, HTN, depression presents with new onset acute heart failure exacerbation, NSTEMI, started on hep gtt, developed bl RP bleed, acute anemia. sp MICU course for hemorrhagic shock, 10/28 sp IR embolization l4 and l5 lumbar artery. sp permacath and AVF.    # Acute systolic and diastolic heart failure  # NSTEMI, medical management  # ESRD, new HD  # Atrial flutter  # acute hypoxic resp failure  # hemorrhagic shock, left RP hematoma, acute blood loss anemia sp embolization l4 and l5 lumbar artery  # lupus anticoagulant +  TTE mod to severe LV SD, hypokinesis anterior wall  10/28 sp IR embolization l4 and l5 lumbar artery, h/h stable  HD via permacath per renal, midodrine during dialysis  sp L AVF   12/13 pt pulled out permacath  IR to replace today  please ask vascular fu to see if AVF mature?    # Parkinsons disease   # delirium  cont on depakote seroquel and cymbalta  c/w trihexyphenidyl, carbidopa/levodopa   per neuro, probable early lewy body dementia related paranoia and agitation  psych fu    # DM2 (diabetes mellitus, type 2)  per endo  hba1c 6.4    # CAD (coronary artery disease)  # HTN  lopressor 50mg bid  c/w atorvastatin  asa on hold    PCP Dr. Simons    DNR/DNI    dw son Branden over the phone today  family still not ready to stop dialysis  vascular and psych fu    [facility]  [phone]

## 2021-12-18 NOTE — PROGRESS NOTE ADULT - SUBJECTIVE AND OBJECTIVE BOX
Chief complaint    Patient is a 71y old  Male who presents with a chief complaint of leg swelling (18 Dec 2021 11:46)   Review of systems  Patient in bed, appears comfortable.    Labs and Fingersticks  CAPILLARY BLOOD GLUCOSE      POCT Blood Glucose.: 96 mg/dL (18 Dec 2021 13:03)  POCT Blood Glucose.: 130 mg/dL (18 Dec 2021 07:24)  POCT Blood Glucose.: 132 mg/dL (17 Dec 2021 21:36)  POCT Blood Glucose.: 130 mg/dL (17 Dec 2021 17:29)      Anion Gap, Serum: 15 (12-18 @ 07:21)  Anion Gap, Serum: 13 (12-17 @ 06:15)      Calcium, Total Serum: 10.6 *H* (12-18 @ 07:21)  Calcium, Total Serum: 10.6 *H* (12-17 @ 06:15)  Albumin, Serum: 2.3 *L* (12-17 @ 06:15)    Alanine Aminotransferase (ALT/SGPT): 7 *L* (12-17 @ 06:15)  Alkaline Phosphatase, Serum: 92 (12-17 @ 06:15)  Aspartate Aminotransferase (AST/SGOT): 30 (12-17 @ 06:15)        12-18    134<L>  |  97  |  45<H>  ----------------------------<  109<H>  4.6   |  22  |  5.26<H>    Ca    10.6<H>      18 Dec 2021 07:21  Phos  5.8     12-18  Mg     2.1     12-18    TPro  8.3  /  Alb  2.3<L>  /  TBili  0.6  /  DBili  x   /  AST  30  /  ALT  7<L>  /  AlkPhos  92  12-17                        9.1    10.18 )-----------( 246      ( 18 Dec 2021 07:22 )             30.1     Medications  MEDICATIONS  (STANDING):  atorvastatin 80 milliGRAM(s) Oral at bedtime  carbidopa/levodopa  25/100 2.5 Tablet(s) Oral <User Schedule>  carbidopa/levodopa  25/100 2 Tablet(s) Oral <User Schedule>  chlorhexidine 4% Liquid 1 Application(s) Topical <User Schedule>  cholecalciferol 1000 Unit(s) Oral daily  dextrose 40% Gel 15 Gram(s) Oral once  dextrose 5%. 1000 milliLiter(s) (50 mL/Hr) IV Continuous <Continuous>  dextrose 5%. 1000 milliLiter(s) (100 mL/Hr) IV Continuous <Continuous>  dextrose 50% Injectable 25 Gram(s) IV Push once  dextrose 50% Injectable 12.5 Gram(s) IV Push once  dextrose 50% Injectable 25 Gram(s) IV Push once  diVALproex Sprinkle 250 milliGRAM(s) Oral three times a day  DULoxetine 20 milliGRAM(s) Oral daily  folic acid 1 milliGRAM(s) Oral daily  glucagon  Injectable 1 milliGRAM(s) IntraMuscular once  insulin glargine Injectable (LANTUS) 8 Unit(s) SubCutaneous at bedtime  insulin lispro (ADMELOG) corrective regimen sliding scale   SubCutaneous three times a day before meals  insulin lispro (ADMELOG) corrective regimen sliding scale   SubCutaneous at bedtime  levothyroxine 50 MICROGram(s) Oral daily  memantine 5 milliGRAM(s) Oral at bedtime  metoprolol tartrate 50 milliGRAM(s) Oral two times a day  midodrine 10 milliGRAM(s) Oral <User Schedule>  Nephro-celeste 1 Tablet(s) Oral daily  polyethylene glycol 3350 17 Gram(s) Oral two times a day  QUEtiapine 25 milliGRAM(s) Oral three times a day  senna Syrup 10 milliLiter(s) Oral at bedtime  trihexyphenidyl 2 milliGRAM(s) Oral three times a day      Physical Exam  General: Patient comfortable in bed  Vital Signs Last 12 Hrs  T(F): 97.9 (12-18-21 @ 12:56), Max: 97.9 (12-18-21 @ 12:56)  HR: 108 (12-18-21 @ 12:56) (93 - 108)  BP: 131/76 (12-18-21 @ 12:56) (102/53 - 131/76)  BP(mean): --  RR: 18 (12-18-21 @ 12:56) (16 - 18)  SpO2: 97% (12-18-21 @ 12:56) (96% - 98%)  Neck: No palpable thyroid nodules.  CVS: S1S2, No murmurs  Respiratory: No wheezing, no crepitations  GI: Abdomen soft, bowel sounds positive  Musculoskeletal:  edema lower extremities.     Diagnostics

## 2021-12-18 NOTE — PROGRESS NOTE ADULT - SUBJECTIVE AND OBJECTIVE BOX
Patient seen and examined in bed earlier this am.  No new events overnight.  HD scheduled today.    REVIEW OF SYSTEMS:  As per HPI, otherwise 8 full 10 ROS were unremarkable    MEDICATIONS  (STANDING):  atorvastatin 80 milliGRAM(s) Oral at bedtime  carbidopa/levodopa  25/100 2.5 Tablet(s) Oral <User Schedule>  carbidopa/levodopa  25/100 2 Tablet(s) Oral <User Schedule>  chlorhexidine 4% Liquid 1 Application(s) Topical <User Schedule>  cholecalciferol 1000 Unit(s) Oral daily  dextrose 40% Gel 15 Gram(s) Oral once  dextrose 5%. 1000 milliLiter(s) (50 mL/Hr) IV Continuous <Continuous>  dextrose 5%. 1000 milliLiter(s) (100 mL/Hr) IV Continuous <Continuous>  dextrose 50% Injectable 25 Gram(s) IV Push once  dextrose 50% Injectable 12.5 Gram(s) IV Push once  dextrose 50% Injectable 25 Gram(s) IV Push once  diVALproex Sprinkle 250 milliGRAM(s) Oral three times a day  DULoxetine 20 milliGRAM(s) Oral daily  folic acid 1 milliGRAM(s) Oral daily  glucagon  Injectable 1 milliGRAM(s) IntraMuscular once  insulin glargine Injectable (LANTUS) 8 Unit(s) SubCutaneous at bedtime  insulin lispro (ADMELOG) corrective regimen sliding scale   SubCutaneous three times a day before meals  insulin lispro (ADMELOG) corrective regimen sliding scale   SubCutaneous at bedtime  levothyroxine 50 MICROGram(s) Oral daily  memantine 5 milliGRAM(s) Oral at bedtime  metoprolol tartrate 50 milliGRAM(s) Oral two times a day  midodrine 10 milliGRAM(s) Oral <User Schedule>  Nephro-celeste 1 Tablet(s) Oral daily  polyethylene glycol 3350 17 Gram(s) Oral two times a day  QUEtiapine 25 milliGRAM(s) Oral three times a day  senna Syrup 10 milliLiter(s) Oral at bedtime  trihexyphenidyl 2 milliGRAM(s) Oral three times a day      VITAL:  T(C): , Max: 36.6 (12-17-21 @ 12:19)  T(F): , Max: 97.9 (12-17-21 @ 12:19)  HR: 93 (12-18-21 @ 08:15)  BP: 102/53 (12-18-21 @ 08:15)  BP(mean): --  RR: 18 (12-18-21 @ 08:15)  SpO2: 96% (12-18-21 @ 08:15)  Wt(kg): --    I and O's:    12-17 @ 07:01  -  12-18 @ 07:00  --------------------------------------------------------  IN: 420 mL / OUT: 250 mL / NET: 170 mL          PHYSICAL EXAM:    Constitutional: NAD  HEENT: PERRLA, EOMI,  MMM  Neck: No LAD, No JVD  Respiratory: CTAB  Cardiovascular: S1 and S2  Gastrointestinal: BS+, soft, NT/ND  Extremities: No peripheral edema  Neurological: pleasantly confused  : No Javier  Skin: No rashes  Access: HD PC + AVF (+thrill)    LABS:                        9.1    10.18 )-----------( 246      ( 18 Dec 2021 07:22 )             30.1     12-18    134<L>  |  97  |  45<H>  ----------------------------<  109<H>  4.6   |  22  |  5.26<H>    Ca    10.6<H>      18 Dec 2021 07:21  Phos  5.8     12-18  Mg     2.1     12-18    TPro  8.3  /  Alb  2.3<L>  /  TBili  0.6  /  DBili  x   /  AST  30  /  ALT  7<L>  /  AlkPhos  92  12-17      Assessment and Plan:   · Assessment	  70yo M w/ PMHx of CAD (s/p CABG 2019), CKD (unknown stage), DM2, Parkinson's Disease, HTN, depression presents with bilateral leg swelling  ESRD   Severe LV dysfunction; A flutter         1 Palliation - Intermittent confusion   2 Renal-  Plan for HD today with EPO administration  3 CVS- BP controlled at present, on Midodrine with parameters, Lopressor for heart rate control   4 Anemia -  Retacrit 10k units with HD today; hgb improving    5 Vasc - s/p  LUE AVF--Immature ;  Perm cath     Family not interested in stopping hd as per Dr. Frias

## 2021-12-19 NOTE — PROGRESS NOTE ADULT - ASSESSMENT
Assessment  DMT2: 71y Male with DM T2 with hyperglycemia, A1C 6.4%, was on insulin at home, now on basal insulin and coverage, blood sugars improving, no new hypoglycemias, eating meals.  Hypothyroidism: Patient has no history thyroid disease, was not on any thyroid supplements, subclinical with low-normal FT4, lethargic,  started on synthroid 25 mcg po daily, repeat FT4 trending down, increased to synthroid 50 mcg po daily, FT4 improving.  CHF: on medications, stable, monitored.  HTN: Controlled,  on antihypertensive medications.  Parkinsons: on meds, monitored.  CKD: Monitor labs/BMP      Kelsie Reece MD  Cell: [phone]  Office: [phone]

## 2021-12-19 NOTE — PROGRESS NOTE ADULT - SUBJECTIVE AND OBJECTIVE BOX
Patient is a 71y old  Male who presents with a chief complaint of leg swelling (19 Dec 2021 18:01)      INTERVAL HPI/OVERNIGHT EVENTS: noted  pt seen and examined this am   events noted  feels well      Vital Signs Last 24 Hrs  T(C): 36.9 (19 Dec 2021 12:03), Max: 37.1 (19 Dec 2021 11:49)  T(F): 98.5 (19 Dec 2021 12:03), Max: 98.7 (19 Dec 2021 11:49)  HR: 80 (19 Dec 2021 12:03) (80 - 99)  BP: 93/62 (19 Dec 2021 12:03) (93/62 - 117/70)  BP(mean): --  RR: 18 (19 Dec 2021 12:03) (16 - 18)  SpO2: 100% (19 Dec 2021 12:03) (80% - 100%)    acetaminophen     Tablet .. 650 milliGRAM(s) Oral every 6 hours PRN  albuterol/ipratropium for Nebulization 3 milliLiter(s) Nebulizer every 6 hours PRN  atorvastatin 80 milliGRAM(s) Oral at bedtime  carbidopa/levodopa  25/100 2.5 Tablet(s) Oral <User Schedule>  carbidopa/levodopa  25/100 2 Tablet(s) Oral <User Schedule>  chlorhexidine 4% Liquid 1 Application(s) Topical <User Schedule>  cholecalciferol 1000 Unit(s) Oral daily  dextrose 40% Gel 15 Gram(s) Oral once  dextrose 5%. 1000 milliLiter(s) IV Continuous <Continuous>  dextrose 5%. 1000 milliLiter(s) IV Continuous <Continuous>  dextrose 50% Injectable 25 Gram(s) IV Push once  dextrose 50% Injectable 12.5 Gram(s) IV Push once  dextrose 50% Injectable 25 Gram(s) IV Push once  diVALproex Sprinkle 125 milliGRAM(s) Oral every 8 hours PRN  diVALproex Sprinkle 250 milliGRAM(s) Oral three times a day  DULoxetine 20 milliGRAM(s) Oral daily  folic acid 1 milliGRAM(s) Oral daily  glucagon  Injectable 1 milliGRAM(s) IntraMuscular once  guaiFENesin Oral Liquid (Sugar-Free) 200 milliGRAM(s) Oral every 6 hours PRN  HYDROmorphone  Injectable 0.25 milliGRAM(s) IV Push every 10 minutes PRN  insulin glargine Injectable (LANTUS) 8 Unit(s) SubCutaneous at bedtime  insulin lispro (ADMELOG) corrective regimen sliding scale   SubCutaneous three times a day before meals  insulin lispro (ADMELOG) corrective regimen sliding scale   SubCutaneous at bedtime  levothyroxine 50 MICROGram(s) Oral daily  LORazepam     Tablet 0.25 milliGRAM(s) Oral every 8 hours PRN  memantine 5 milliGRAM(s) Oral at bedtime  metoprolol tartrate 50 milliGRAM(s) Oral two times a day  midodrine 10 milliGRAM(s) Oral <User Schedule>  Nephro-celeste 1 Tablet(s) Oral daily  ondansetron Injectable 4 milliGRAM(s) IV Push once PRN  polyethylene glycol 3350 17 Gram(s) Oral two times a day  QUEtiapine 12.5 milliGRAM(s) Oral every 6 hours PRN  QUEtiapine 25 milliGRAM(s) Oral three times a day  senna Syrup 10 milliLiter(s) Oral at bedtime  sodium chloride 0.9% lock flush 10 milliLiter(s) IV Push every 1 hour PRN  trihexyphenidyl 2 milliGRAM(s) Oral three times a day      PHYSICAL EXAM:  GENERAL: NAD,   EYES: conjunctiva and sclera clear  ENMT: Moist mucous membranes  NECK: Supple, No JVD, Normal thyroid  CHEST/LUNG: non labored, cta b/l  HEART: Regular rate and rhythm; No murmurs, rubs, or gallops  ABDOMEN: Soft, Nontender, Nondistended; Bowel sounds present  EXTREMITIES:  2+ Peripheral Pulses, No clubbing, cyanosis, or edema  LYMPH: No lymphadenopathy noted  SKIN: No rashes or lesions    Consultant(s) Notes Reviewed:  [x ] YES  [ ] NO  Care Discussed with Consultants/Other Providers [ x] YES  [ ] NO    LABS:                        9.1    10.18 )-----------( 246      ( 18 Dec 2021 07:22 )             30.1     12-19    136  |  97  |  27<H>  ----------------------------<  94  4.0   |  26  |  3.94<H>    Ca    10.4      19 Dec 2021 05:43  Phos  5.8     12-18  Mg     2.0     12-19          CAPILLARY BLOOD GLUCOSE      POCT Blood Glucose.: 136 mg/dL (19 Dec 2021 17:15)  POCT Blood Glucose.: 153 mg/dL (19 Dec 2021 12:57)  POCT Blood Glucose.: 109 mg/dL (19 Dec 2021 09:14)  POCT Blood Glucose.: 140 mg/dL (19 Dec 2021 02:06)  POCT Blood Glucose.: 102 mg/dL (18 Dec 2021 21:26)              RADIOLOGY & ADDITIONAL TESTS:    Imaging Personally Reviewed:  [x ] YES  [ ] NO

## 2021-12-19 NOTE — PROGRESS NOTE ADULT - ASSESSMENT
70yo M w/ PMHx of CAD (s/p CABG 2019), CKD (unknown stage), DM2, Parkinson's Disease, HTN, depression presents with new onset acute heart failure exacerbation, NSTEMI, started on hep gtt, developed bl RP bleed, acute anemia. sp MICU course for hemorrhagic shock, 10/28 sp IR embolization l4 and l5 lumbar artery. sp permacath and AVF.    # Acute systolic and diastolic heart failure  # NSTEMI, medical management  # ESRD, new HD  # Atrial flutter  # acute hypoxic resp failure  # hemorrhagic shock, left RP hematoma, acute blood loss anemia sp embolization l4 and l5 lumbar artery  # lupus anticoagulant +  TTE mod to severe LV SD, hypokinesis anterior wall  10/28 sp IR embolization l4 and l5 lumbar artery, h/h stable  HD via permacath per renal, midodrine during dialysis  sp L AVF   12/13 pt pulled out permacath  IR to replace today  please ask vascular fu to see if AVF mature?    # Parkinsons disease   # delirium  cont on depakote seroquel and cymbalta  c/w trihexyphenidyl, carbidopa/levodopa   per neuro, probable early lewy body dementia related paranoia and agitation  psych fu    # DM2 (diabetes mellitus, type 2)  per endo  hba1c 6.4    # CAD (coronary artery disease)  # HTN  lopressor 50mg bid  c/w atorvastatin  asa on hold    PCP Dr. Simons    DNR/DNI      [facility]  [phone]

## 2021-12-19 NOTE — PROGRESS NOTE ADULT - SUBJECTIVE AND OBJECTIVE BOX
Chief complaint    Patient is a 71y old  Male who presents with a chief complaint of leg swelling (18 Dec 2021 14:00)   Review of systems  Patient in bed, appears comfortable.    Labs and Fingersticks  CAPILLARY BLOOD GLUCOSE      POCT Blood Glucose.: 136 mg/dL (19 Dec 2021 17:15)  POCT Blood Glucose.: 153 mg/dL (19 Dec 2021 12:57)  POCT Blood Glucose.: 109 mg/dL (19 Dec 2021 09:14)  POCT Blood Glucose.: 140 mg/dL (19 Dec 2021 02:06)  POCT Blood Glucose.: 102 mg/dL (18 Dec 2021 21:26)      Anion Gap, Serum: 13 (12-19 @ 05:43)  Anion Gap, Serum: 15 (12-18 @ 07:21)      Calcium, Total Serum: 10.4 (12-19 @ 05:43)  Calcium, Total Serum: 10.6 *H* (12-18 @ 07:21)          12-19    136  |  97  |  27<H>  ----------------------------<  94  4.0   |  26  |  3.94<H>    Ca    10.4      19 Dec 2021 05:43  Phos  5.8     12-18  Mg     2.0     12-19                          9.1    10.18 )-----------( 246      ( 18 Dec 2021 07:22 )             30.1     Medications  MEDICATIONS  (STANDING):  atorvastatin 80 milliGRAM(s) Oral at bedtime  carbidopa/levodopa  25/100 2.5 Tablet(s) Oral <User Schedule>  carbidopa/levodopa  25/100 2 Tablet(s) Oral <User Schedule>  chlorhexidine 4% Liquid 1 Application(s) Topical <User Schedule>  cholecalciferol 1000 Unit(s) Oral daily  dextrose 40% Gel 15 Gram(s) Oral once  dextrose 5%. 1000 milliLiter(s) (50 mL/Hr) IV Continuous <Continuous>  dextrose 5%. 1000 milliLiter(s) (100 mL/Hr) IV Continuous <Continuous>  dextrose 50% Injectable 25 Gram(s) IV Push once  dextrose 50% Injectable 12.5 Gram(s) IV Push once  dextrose 50% Injectable 25 Gram(s) IV Push once  diVALproex Sprinkle 250 milliGRAM(s) Oral three times a day  DULoxetine 20 milliGRAM(s) Oral daily  folic acid 1 milliGRAM(s) Oral daily  glucagon  Injectable 1 milliGRAM(s) IntraMuscular once  insulin glargine Injectable (LANTUS) 8 Unit(s) SubCutaneous at bedtime  insulin lispro (ADMELOG) corrective regimen sliding scale   SubCutaneous at bedtime  insulin lispro (ADMELOG) corrective regimen sliding scale   SubCutaneous three times a day before meals  levothyroxine 50 MICROGram(s) Oral daily  memantine 5 milliGRAM(s) Oral at bedtime  metoprolol tartrate 50 milliGRAM(s) Oral two times a day  midodrine 10 milliGRAM(s) Oral <User Schedule>  Nephro-celeste 1 Tablet(s) Oral daily  polyethylene glycol 3350 17 Gram(s) Oral two times a day  QUEtiapine 25 milliGRAM(s) Oral three times a day  senna Syrup 10 milliLiter(s) Oral at bedtime  trihexyphenidyl 2 milliGRAM(s) Oral three times a day      Physical Exam  General: Patient comfortable in bed  Vital Signs Last 12 Hrs  T(F): 98.5 (12-19-21 @ 12:03), Max: 98.7 (12-19-21 @ 11:49)  HR: 80 (12-19-21 @ 12:03) (80 - 99)  BP: 93/62 (12-19-21 @ 12:03) (93/62 - 98/76)  BP(mean): --  RR: 18 (12-19-21 @ 12:03) (17 - 18)  SpO2: 100% (12-19-21 @ 12:03) (80% - 100%)  Neck: No palpable thyroid nodules.  CVS: S1S2, No murmurs  Respiratory: No wheezing, no crepitations  GI: Abdomen soft, bowel sounds positive  Musculoskeletal:  edema lower extremities.     Diagnostics

## 2021-12-20 NOTE — PROGRESS NOTE ADULT - PROBLEM SELECTOR PLAN 1
Will decrease Lantus to 6u at bedtime and continue coverage scale ac/hs. Will continue monitoring FS and FU.  Patient previously on large-dose basal bolus insulin. Suggest DC on current inpatient insulin regimen, endo FU 4 weeks.   for compliance with consistent low carb diet.

## 2021-12-20 NOTE — PROGRESS NOTE ADULT - ASSESSMENT
72yo M w/ PMHx of CAD (s/p CABG 2019), CKD (unknown stage), DM2, Parkinson's Disease, HTN, depression presents with bilateral leg swelling  ESRD   Severe LV dysfunction; A flutter         1 Palliation - Intermittent confusion   2 Renal-  Plan for HD Tuesday with EPO administration  3 CVS- BP controlled at present, on Midodrine with parameters, Lopressor for heart rate control   4 Anemia -  Retacrit 10k units with HD today; hgb improving    5 Vasc - s/p  LUE AVF--Immature(this will take weeks to mature) ;  Perm cath         [facility]   [phone]

## 2021-12-20 NOTE — CHART NOTE - NSCHARTNOTEFT_GEN_A_CORE
Nutrition Follow Up Note  Patient seen for: follow up/STAR education    Chart reviewed, events noted.    *STAR CHF new HD-permacath, still w/ wrist restraints F/U PSYCH. f/u LUE doppler s/p AVF, mitjericho, [facility] with HD (medicare) - sent globally has accepting facility but needs behavior better controlled    Source: [] Patient       [x] EMR        [] RN        [] Family at bedside       [] Other:    -If unable to interview patient: [] Trach/Vent/BiPAP  [] Disoriented/confused/inappropriate to interview    Diet Order:   Diet, Regular:   Supplement Feeding Modality:  Oral  Nepro Cans or Servings Per Day:  1       Frequency:  Daily (-)    - Is current order appropriate/adequate? [] Yes  []  No:     - PO intake meals :   [] >75%  Adequate    [] 50-75%  Fair       [] <50%  Poor  - PO intake of supplements if pt receiving: []>75% []50% []25%   as per   []flow sheet  []patient  []family/aide  []PCA  []Nurse  []RD observation    - Nutrition-related concerns:    pt seen by SLP []Yes []No    GI:  Last BM ___.   Bowel Regimen? [] Yes   [] No      Weights:   Daily Weight in k.7 (12-20), Weight in k.4 (12-20), Weight in k.6 (12-19), Weight in k (12-18), Weight in k (12-18), Weight in k (12-16), Weight in k (12-16)    Nutritionally Pertinent MEDICATIONS  (STANDING):  atorvastatin  cholecalciferol  cinacalcet  dextrose 40% Gel  dextrose 5%.  dextrose 5%.  dextrose 50% Injectable  dextrose 50% Injectable  dextrose 50% Injectable  folic acid  glucagon  Injectable  insulin glargine Injectable (LANTUS)  insulin lispro (ADMELOG) corrective regimen sliding scale  insulin lispro (ADMELOG) corrective regimen sliding scale  levothyroxine  metoprolol tartrate  midodrine  Nephro-celeste  polyethylene glycol 3350  senna Syrup    Pertinent Labs:  @ 07:04: Na 135, BUN 42<H>, Cr 5.04<H>, BG 86, K+ 4.4, Phos --, Mg --, Alk Phos --, ALT/SGPT --, AST/SGOT --, HbA1c --    A1C with Estimated Average Glucose Result: 6.4 % (10-22-21 @ 08:56)    Finger Sticks:  POCT Blood Glucose.: 90 mg/dL ( @ 09:37)  POCT Blood Glucose.: 132 mg/dL ( @ 21:19)  POCT Blood Glucose.: 136 mg/dL ( @ 17:15)  POCT Blood Glucose.: 153 mg/dL ( @ 12:57)      Pressure Injuries as per nursing documentation:   Edema:     Estimated Needs:   [] no change since previous assessment  [] recalculated:     Previous Nutrition Diagnosis:   Nutrition Diagnosis is: [] ongoing  [] resolved [] not applicable     New Nutrition Diagnosis: [] Not applicable    Nutrition Care Plan:  [] In Progress  [] Achieved  [] Not applicable    Nutrition Interventions:     Education Provided:       [] Yes:  [] No:   pt was educated on the importance of supplements to increase calorie and protein intake in light of RD's nutritional findings []Yes []N/A         Recommendations:         [] Continue current diet order     [] Change diet to:      [] continue oral nutrition supplement:        [] Add micronutrient supplementation:      [] Continue current micronutrient supplementation:        []Discussed recommendations with provider     [] Needed to escalate to provider     []Placed pending verification with NP/PA      []Placed pending verification with Team      []Placed sticker (malnutrition/BMI/underweight)     []Recommend swallow evaluation     [] monitor need for diet ed reinforcement     [] Other:     Monitoring and Evaluation:   Continue to monitor nutritional intake, tolerance to diet prescription, weights, labs, skin integrity      RD remains available upon request and will follow up per protocol Nutrition Follow Up Note  Patient seen for: follow up/STAR education    Chart reviewed, events noted.    *STAR CHF new HD-permacath, still w/ wrist restraints F/U PSYCH. f/u LUE doppler s/p AVF, mitts, [facility] with HD (medicare) - sent globally has accepting facility but needs behavior better controlled    12/15:  Pt s/p tunneled hemodialysis catheter reinsertion in  right internal jugular, . He remains on enhanced supervision for safety,  bilateral mittens in place. Discharge plan remains subacute rehab with HD once  behavior is well managed.    Source: [] Patient       [x] EMR        [] RN        [] Family at bedside       [] Other:    -If unable to interview patient: [] Trach/Vent/BiPAP  [x] Disoriented/confused/inappropriate to interview    Diet Order:   Diet, Regular:   Supplement Feeding Modality:  Oral  Nepro Cans or Servings Per Day:  1       Frequency:  Daily (-)    - Is current order appropriate/adequate? [x] Yes  []  No:     - PO intake meals :   [] >75%  Adequate    [] 50-75%  Fair       [] <50%  Poor  - PO intake of supplements if pt receiving: []>75% []50% []25%     intake varies as per flow sheets  as per   [x]flow sheet  []patient  []family/aide  []PCA  []Nurse  []RD observation    - Nutrition-related concerns: poor intake, malnutrition     pt seen by SLP [x]Yes []No  : Recommended Consistencies	Regular      GI:  Last BM ___.   Bowel Regimen? [x] Yes-miralax, senna   [] No      Weights:   Daily Weight in k.7 (12-20), Weight in k.4 (12-20), Weight in k.6 (12-19), Weight in k (12-18), Weight in k (12-18), Weight in k (12-16), Weight in k (12-16)    Nutritionally Pertinent MEDICATIONS  (STANDING):  atorvastatin  cholecalciferol  cinacalcet  dextrose 40% Gel  dextrose 5%.  dextrose 5%.  dextrose 50% Injectable  dextrose 50% Injectable  dextrose 50% Injectable  folic acid  glucagon  Injectable  insulin glargine Injectable (LANTUS)  insulin lispro (ADMELOG) corrective regimen sliding scale  insulin lispro (ADMELOG) corrective regimen sliding scale  levothyroxine  metoprolol tartrate  midodrine  Nephro-celeste  polyethylene glycol 3350  senna Syrup    Pertinent Labs:  @ 07:04: Na 135, BUN 42<H>, Cr 5.04<H>, BG 86, K+ 4.4, Phos --, Mg --, Alk Phos --, ALT/SGPT --, AST/SGOT --, HbA1c --    A1C with Estimated Average Glucose Result: 6.4 % (10-22-21 @ 08:56)    Finger Sticks:  POCT Blood Glucose.: 90 mg/dL ( @ 09:37)  POCT Blood Glucose.: 132 mg/dL ( @ 21:19)  POCT Blood Glucose.: 136 mg/dL ( @ 17:15)  POCT Blood Glucose.: 153 mg/dL ( @ 12:57)      Pressure Injuries as per nursing documentation: none  Edema:  +1 bilateral ankles, left arm    Estimated Needs:   [x] no change since previous assessment  [] recalculated:     Estimated Energy Needs: 9815-1964 kcal (30-35 kcal/kg)  Estimated Protein Needs:  gm protein (1.2-1.4 g/kg)  Defer fluid needs to team  based on IBW 78 kg    Previous Nutrition Diagnosis:  Increased Nutrient Needs, Food and Nutrition Related Knowledge Deficit, Severe acute on chronic malnutrition  Nutrition Diagnosis is: [x] ongoing  [] resolved [] not applicable     New Nutrition Diagnosis: [x] Not applicable    Nutrition Care Plan:  [] In Progress  [x] Achieved  [] Not applicable    Nutrition Interventions:     Education Provided:       [] Yes:  [x] No:   pt was educated on the importance of supplements to increase calorie and protein intake in light of RD's nutritional findings []Yes [x]N/A-pt confused         Recommendations:         [x] Continue current diet order     [] Change diet to:      [x] continue oral nutrition supplement: Nepro x 1 daily        [] Add micronutrient supplementation:      [x] Continue current micronutrient supplementation: Nephro-Celeste, Vit D3       []Discussed recommendations with provider     [] Needed to escalate to provider     []Placed pending verification with NP/PA      []Placed pending verification with Team      []Placed sticker (malnutrition/BMI/underweight)     []Recommend swallow evaluation     [] monitor need for diet ed reinforcement     [] Other:     Monitoring and Evaluation:   Continue to monitor nutritional intake, tolerance to diet prescription, weights, labs, skin integrity  Nisreen Tolbert MA, RD, CDN #[phone]       RD remains available upon request and will follow up per protocol

## 2021-12-20 NOTE — CHART NOTE - NSCHARTNOTEFT_GEN_A_CORE
RD CHF education chart note    Patient was visited by RD for CHF education. pt confused in enhanced room on [facility]. Written heart failure education provided to the patient in detail. Unable to discuss heart failure nutrition therapy, sodium and fluid intake, importance of diet adherence, daily weights monitoring with the patient. Unable to reinforce the importance of weight gain parameters and importance of contacting MD’s about weight changes. Provided handouts on heart failure nutrition therapy, reading heart healthy nutrition labels, heart healthy shopping tips and low sodium food list at bedside. Patient unable to verbalize understanding demonstrated by teach back method. RD contact information left with patient for any future questioning by family.

## 2021-12-20 NOTE — PROGRESS NOTE ADULT - SUBJECTIVE AND OBJECTIVE BOX
Chief complaint  Patient is a 71y old  Male who presents with a chief complaint of leg swelling (20 Dec 2021 10:32)   Review of systems  Patient in bed, looks comfortable, no hypoglycemic episodes.    Labs and Fingersticks  CAPILLARY BLOOD GLUCOSE      POCT Blood Glucose.: 80 mg/dL (20 Dec 2021 13:04)  POCT Blood Glucose.: 90 mg/dL (20 Dec 2021 09:37)  POCT Blood Glucose.: 132 mg/dL (19 Dec 2021 21:19)  POCT Blood Glucose.: 136 mg/dL (19 Dec 2021 17:15)      Anion Gap, Serum: 16 (12-20 @ 07:04)  Anion Gap, Serum: 13 (12-19 @ 05:43)      Calcium, Total Serum: 11.4 *H* (12-20 @ 07:04)  Calcium, Total Serum: 10.4 (12-19 @ 05:43)          12-20    135  |  95<L>  |  42<H>  ----------------------------<  86  4.4   |  24  |  5.04<H>    Ca    11.4<H>      20 Dec 2021 07:04  Mg     2.0     12-19                          8.6    8.97  )-----------( 235      ( 20 Dec 2021 07:07 )             29.0     Medications  MEDICATIONS  (STANDING):  atorvastatin 80 milliGRAM(s) Oral at bedtime  carbidopa/levodopa  25/100 2.5 Tablet(s) Oral <User Schedule>  carbidopa/levodopa  25/100 2 Tablet(s) Oral <User Schedule>  chlorhexidine 4% Liquid 1 Application(s) Topical <User Schedule>  cholecalciferol 1000 Unit(s) Oral daily  cinacalcet 60 milliGRAM(s) Oral daily  dextrose 40% Gel 15 Gram(s) Oral once  dextrose 5%. 1000 milliLiter(s) (50 mL/Hr) IV Continuous <Continuous>  dextrose 5%. 1000 milliLiter(s) (100 mL/Hr) IV Continuous <Continuous>  dextrose 50% Injectable 25 Gram(s) IV Push once  dextrose 50% Injectable 12.5 Gram(s) IV Push once  dextrose 50% Injectable 25 Gram(s) IV Push once  diVALproex Sprinkle 250 milliGRAM(s) Oral three times a day  DULoxetine 20 milliGRAM(s) Oral daily  folic acid 1 milliGRAM(s) Oral daily  glucagon  Injectable 1 milliGRAM(s) IntraMuscular once  insulin glargine Injectable (LANTUS) 8 Unit(s) SubCutaneous at bedtime  insulin lispro (ADMELOG) corrective regimen sliding scale   SubCutaneous three times a day before meals  insulin lispro (ADMELOG) corrective regimen sliding scale   SubCutaneous at bedtime  levothyroxine 50 MICROGram(s) Oral daily  memantine 5 milliGRAM(s) Oral at bedtime  metoprolol tartrate 50 milliGRAM(s) Oral two times a day  midodrine 10 milliGRAM(s) Oral <User Schedule>  Nephro-celeste 1 Tablet(s) Oral daily  polyethylene glycol 3350 17 Gram(s) Oral two times a day  QUEtiapine 25 milliGRAM(s) Oral three times a day  senna Syrup 10 milliLiter(s) Oral at bedtime  trihexyphenidyl 2 milliGRAM(s) Oral three times a day      Physical Exam  General: Patient comfortable in bed  Vital Signs Last 12 Hrs  T(F): 97.6 (12-20-21 @ 04:04), Max: 97.6 (12-20-21 @ 04:04)  HR: 108 (12-20-21 @ 13:00) (99 - 108)  BP: 120/77 (12-20-21 @ 13:00) (120/77 - 136/82)  BP(mean): --  RR: 18 (12-20-21 @ 13:00) (18 - 18)  SpO2: 97% (12-20-21 @ 13:00) (97% - 98%)  Neck: No palpable thyroid nodules.  CVS: S1S2, No murmurs  Respiratory: No wheezing, no crepitations  GI: Abdomen soft, bowel sounds positive  Musculoskeletal:  edema lower extremities.   Skin: No skin rashes, no ecchymosis    Diagnostics    Free Thyroxine, Serum: AM Sched. Collection: 16-Dec-2021 06:00 (12-15 @ 14:39)  Free Thyroxine, Serum: AM Sched. Collection: 08-Dec-2021 06:00 (12-07 @ 14:26)         Chief complaint  Patient is a 71y old  Male who presents with a chief complaint of leg swelling (20 Dec 2021 10:32)   Review of systems  Patient in bed, looks comfortable, no hypoglycemic episodes.    Labs and Fingersticks  CAPILLARY BLOOD GLUCOSE      POCT Blood Glucose.: 80 mg/dL (20 Dec 2021 13:04)  POCT Blood Glucose.: 90 mg/dL (20 Dec 2021 09:37)  POCT Blood Glucose.: 132 mg/dL (19 Dec 2021 21:19)  POCT Blood Glucose.: 136 mg/dL (19 Dec 2021 17:15)      Anion Gap, Serum: 16 (12-20 @ 07:04)  Anion Gap, Serum: 13 (12-19 @ 05:43)      Calcium, Total Serum: 11.4 *H* (12-20 @ 07:04)  Calcium, Total Serum: 10.4 (12-19 @ 05:43)          12-20    135  |  95<L>  |  42<H>  ----------------------------<  86  4.4   |  24  |  5.04<H>    Ca    11.4<H>      20 Dec 2021 07:04  Mg     2.0     12-19                          8.6    8.97  )-----------( 235      ( 20 Dec 2021 07:07 )             29.0     Medications  MEDICATIONS  (STANDING):  atorvastatin 80 milliGRAM(s) Oral at bedtime  carbidopa/levodopa  25/100 2.5 Tablet(s) Oral <User Schedule>  carbidopa/levodopa  25/100 2 Tablet(s) Oral <User Schedule>  chlorhexidine 4% Liquid 1 Application(s) Topical <User Schedule>  cholecalciferol 1000 Unit(s) Oral daily  cinacalcet 60 milliGRAM(s) Oral daily  dextrose 40% Gel 15 Gram(s) Oral once  dextrose 5%. 1000 milliLiter(s) (50 mL/Hr) IV Continuous <Continuous>  dextrose 5%. 1000 milliLiter(s) (100 mL/Hr) IV Continuous <Continuous>  dextrose 50% Injectable 25 Gram(s) IV Push once  dextrose 50% Injectable 12.5 Gram(s) IV Push once  dextrose 50% Injectable 25 Gram(s) IV Push once  diVALproex Sprinkle 250 milliGRAM(s) Oral three times a day  DULoxetine 20 milliGRAM(s) Oral daily  folic acid 1 milliGRAM(s) Oral daily  glucagon  Injectable 1 milliGRAM(s) IntraMuscular once  insulin glargine Injectable (LANTUS) 8 Unit(s) SubCutaneous at bedtime  insulin lispro (ADMELOG) corrective regimen sliding scale   SubCutaneous three times a day before meals  insulin lispro (ADMELOG) corrective regimen sliding scale   SubCutaneous at bedtime  levothyroxine 50 MICROGram(s) Oral daily  memantine 5 milliGRAM(s) Oral at bedtime  metoprolol tartrate 50 milliGRAM(s) Oral two times a day  midodrine 10 milliGRAM(s) Oral <User Schedule>  Nephro-celeste 1 Tablet(s) Oral daily  polyethylene glycol 3350 17 Gram(s) Oral two times a day  QUEtiapine 25 milliGRAM(s) Oral three times a day  senna Syrup 10 milliLiter(s) Oral at bedtime  trihexyphenidyl 2 milliGRAM(s) Oral three times a day      Physical Exam  General: Patient comfortable in bed  Vital Signs Last 12 Hrs  T(F): 97.6 (12-20-21 @ 04:04), Max: 97.6 (12-20-21 @ 04:04)  HR: 108 (12-20-21 @ 13:00) (99 - 108)  BP: 120/77 (12-20-21 @ 13:00) (120/77 - 136/82)  BP(mean): --  RR: 18 (12-20-21 @ 13:00) (18 - 18)  SpO2: 97% (12-20-21 @ 13:00) (97% - 98%)  Neck: No palpable thyroid nodules.  CVS: S1S2, No murmurs  Respiratory: No wheezing, no crepitations  GI: Abdomen soft, bowel sounds positive  Musculoskeletal:  edema lower extremities.   Skin: No skin rashes, no ecchymosis    Diagnostics    Free Thyroxine, Serum: AM Sched. Collection: 16-Dec-2021 06:00 (12-15 @ 14:39)  Free Thyroxine, Serum: AM Sched. Collection: 08-Dec-2021 06:00 (12-07 @ 14:26)

## 2021-12-20 NOTE — PROGRESS NOTE ADULT - ASSESSMENT
Assessment  DMT2: 71y Male with DM T2 with hyperglycemia, A1C 6.4%, was on insulin at home, now on basal insulin and coverage, blood sugars trending down, no new hypoglycemias, eating meals, NAD.  Hypothyroidism: Patient has no history thyroid disease, was not on any thyroid supplements, subclinical with low-normal FT4, lethargic,  started on synthroid 25 mcg po daily, repeat FT4 trending down, increased to synthroid 50 mcg po daily, FT4 improving.  CHF: on medications, stable, monitored.  HTN: Controlled,  on antihypertensive medications.  Parkinsons: on meds, monitored.  CKD: Monitor labs/BMP      Kelsie Reece MD  Cell: [phone]  Office: [phone]     Assessment  DMT2: 71y Male with DM T2 with hyperglycemia, A1C 6.4%, was on insulin at home, now on basal insulin and coverage, blood sugars trending down, no new hypoglycemias, eating meals, NAD.  Hypothyroidism: Patient has no history thyroid disease, was not on any thyroid supplements, subclinical with low-normal FT4, lethargic,  started on synthroid 25 mcg po daily, repeat FT4 trending down, increased to synthroid  50 mcg po daily, FT4 improving.  CHF: on medications, stable, monitored.  HTN: Controlled,  on antihypertensive medications.  Parkinsons: on meds, monitored.  CKD: Monitor labs/BMP      Kelsie Reece MD  Cell: [phone]  Office: [phone]

## 2021-12-20 NOTE — PROGRESS NOTE ADULT - SUBJECTIVE AND OBJECTIVE BOX
[facility] [phone]        Patient seen and examined         MEDICATIONS  (STANDING):  atorvastatin 80 milliGRAM(s) Oral at bedtime  carbidopa/levodopa  25/100 2.5 Tablet(s) Oral <User Schedule>  carbidopa/levodopa  25/100 2 Tablet(s) Oral <User Schedule>  chlorhexidine 4% Liquid 1 Application(s) Topical <User Schedule>  cholecalciferol 1000 Unit(s) Oral daily  cinacalcet 60 milliGRAM(s) Oral daily  dextrose 40% Gel 15 Gram(s) Oral once  dextrose 5%. 1000 milliLiter(s) (50 mL/Hr) IV Continuous <Continuous>  dextrose 5%. 1000 milliLiter(s) (100 mL/Hr) IV Continuous <Continuous>  dextrose 50% Injectable 25 Gram(s) IV Push once  dextrose 50% Injectable 12.5 Gram(s) IV Push once  dextrose 50% Injectable 25 Gram(s) IV Push once  diVALproex Sprinkle 250 milliGRAM(s) Oral three times a day  DULoxetine 20 milliGRAM(s) Oral daily  folic acid 1 milliGRAM(s) Oral daily  glucagon  Injectable 1 milliGRAM(s) IntraMuscular once  insulin glargine Injectable (LANTUS) 8 Unit(s) SubCutaneous at bedtime  insulin lispro (ADMELOG) corrective regimen sliding scale   SubCutaneous three times a day before meals  insulin lispro (ADMELOG) corrective regimen sliding scale   SubCutaneous at bedtime  levothyroxine 50 MICROGram(s) Oral daily  memantine 5 milliGRAM(s) Oral at bedtime  metoprolol tartrate 50 milliGRAM(s) Oral two times a day  midodrine 10 milliGRAM(s) Oral <User Schedule>  Nephro-celeste 1 Tablet(s) Oral daily  polyethylene glycol 3350 17 Gram(s) Oral two times a day  QUEtiapine 25 milliGRAM(s) Oral three times a day  senna Syrup 10 milliLiter(s) Oral at bedtime  trihexyphenidyl 2 milliGRAM(s) Oral three times a day      VITAL:  T(C): , Max: 37.1 (12-19-21 @ 11:49)  T(F): , Max: 98.7 (12-19-21 @ 11:49)  HR: 99 (12-20-21 @ 04:04)  BP: 136/82 (12-20-21 @ 04:04)  BP(mean): --  RR: 18 (12-20-21 @ 04:04)  SpO2: 98% (12-20-21 @ 04:04)  Wt(kg): --    I and O's:    12-19 @ 07:01  -  12-20 @ 07:00  --------------------------------------------------------  IN: 750 mL / OUT: 0 mL / NET: 750 mL          PHYSICAL EXAM:    Constitutional: NAD  Neck:  No JVD  Respiratory: CTAB/L  Cardiovascular: S1 and S2  Gastrointestinal: BS+, soft, NT/ND  Extremities: No peripheral edema  Neurological: A/O x 3, no focal deficits  Psychiatric: Normal mood, normal affect  : No Javier  Skin: No rashes  Access: Not applicable    LABS:                        8.6    8.97  )-----------( 235      ( 20 Dec 2021 07:07 )             29.0     12-20    135  |  95<L>  |  42<H>  ----------------------------<  86  4.4   |  24  |  5.04<H>    Ca    11.4<H>      20 Dec 2021 07:04  Mg     2.0     12-19            Urine Studies:          RADIOLOGY & ADDITIONAL STUDIES:

## 2021-12-20 NOTE — PROGRESS NOTE ADULT - ASSESSMENT
71M CAD s/p CABG, CKD, DM, p/w HF, NSTEMI c/b RP bleed, hemorrhagic shock s/p IR embolization of L4/5 lumbar arteries. History of L brachiocephalic AVF creation 11/12/21. Vascular consulted to assess maturity.    Recommendation:  - HD duplex to evaluate AVF maturity pending  - Continue HD through [facility]    MARY Walden, PGY2  Vascular Surgery [id number]

## 2021-12-20 NOTE — PROGRESS NOTE ADULT - ASSESSMENT
70yo M w/ PMHx of CAD (s/p CABG 2019), CKD (unknown stage), DM2, Parkinson's Disease, HTN, depression presents with new onset acute heart failure exacerbation, NSTEMI, started on hep gtt, developed bl RP bleed, acute anemia. sp MICU course for hemorrhagic shock, 10/28 sp IR embolization l4 and l5 lumbar artery. sp permacath and AVF.    # Acute systolic and diastolic heart failure  # NSTEMI, medical management  # ESRD, new HD  # Atrial flutter  # acute hypoxic resp failure  # hemorrhagic shock, left RP hematoma, acute blood loss anemia sp embolization l4 and l5 lumbar artery  # lupus anticoagulant +  TTE mod to severe LV SD, hypokinesis anterior wall  10/28 sp IR embolization l4 and l5 lumbar artery, h/h stable  HD via permacath per renal, midodrine during dialysis  sp L AVF   12/13 pt pulled out permacath  replaced with new permacath 12/14  per renal AVF needs more time to mature    vascular fu to see if /when AVF matures    # Parkinsons disease   # delirium  cont on depakote seroquel nc 50 tid and cymbalta  c/w trihexyphenidyl, carbidopa/levodopa   per neuro, probable early lewy body dementia related paranoia and agitation  psych fu    # DM2 (diabetes mellitus, type 2)  per endo  hba1c 6.4    # CAD (coronary artery disease)  # HTN  lopressor 50mg bid  c/w atorvastatin  asa on hold    PCP Dr. Simons    DNR/DNI      [facility]  [phone]

## 2021-12-21 NOTE — PROGRESS NOTE ADULT - SUBJECTIVE AND OBJECTIVE BOX
Patient is a 71y old  Male who presents with a chief complaint of leg swelling (21 Dec 2021 13:31)      INTERVAL HPI/OVERNIGHT EVENTS: noted  pt seen and examined this am  no new events noted  calm and cooperative, mittens on        Vital Signs Last 24 Hrs  T(C): 36.2 (21 Dec 2021 13:18), Max: 36.6 (21 Dec 2021 05:01)  T(F): 97.2 (21 Dec 2021 13:18), Max: 97.8 (21 Dec 2021 05:01)  HR: 96 (21 Dec 2021 13:18) (75 - 99)  BP: 118/73 (21 Dec 2021 13:18) (91/47 - 126/73)  BP(mean): --  RR: 17 (21 Dec 2021 13:18) (17 - 18)  SpO2: 96% (21 Dec 2021 13:18) (96% - 100%)    acetaminophen     Tablet .. 650 milliGRAM(s) Oral every 6 hours PRN  albuterol/ipratropium for Nebulization 3 milliLiter(s) Nebulizer every 6 hours PRN  atorvastatin 80 milliGRAM(s) Oral at bedtime  carbidopa/levodopa  25/100 2.5 Tablet(s) Oral <User Schedule>  carbidopa/levodopa  25/100 2 Tablet(s) Oral <User Schedule>  chlorhexidine 4% Liquid 1 Application(s) Topical <User Schedule>  cholecalciferol 1000 Unit(s) Oral daily  cinacalcet 60 milliGRAM(s) Oral daily  dextrose 40% Gel 15 Gram(s) Oral once  dextrose 5%. 1000 milliLiter(s) IV Continuous <Continuous>  dextrose 5%. 1000 milliLiter(s) IV Continuous <Continuous>  dextrose 50% Injectable 25 Gram(s) IV Push once  dextrose 50% Injectable 12.5 Gram(s) IV Push once  dextrose 50% Injectable 25 Gram(s) IV Push once  diVALproex Sprinkle 125 milliGRAM(s) Oral every 8 hours PRN  diVALproex Sprinkle 250 milliGRAM(s) Oral three times a day  DULoxetine 20 milliGRAM(s) Oral daily  folic acid 1 milliGRAM(s) Oral daily  glucagon  Injectable 1 milliGRAM(s) IntraMuscular once  guaiFENesin Oral Liquid (Sugar-Free) 200 milliGRAM(s) Oral every 6 hours PRN  HYDROmorphone  Injectable 0.25 milliGRAM(s) IV Push every 10 minutes PRN  insulin lispro (ADMELOG) corrective regimen sliding scale   SubCutaneous three times a day before meals  insulin lispro (ADMELOG) corrective regimen sliding scale   SubCutaneous at bedtime  levothyroxine 50 MICROGram(s) Oral daily  LORazepam     Tablet 0.25 milliGRAM(s) Oral every 8 hours PRN  memantine 5 milliGRAM(s) Oral at bedtime  metoprolol tartrate 50 milliGRAM(s) Oral two times a day  midodrine 10 milliGRAM(s) Oral <User Schedule>  Nephro-celeste 1 Tablet(s) Oral daily  ondansetron Injectable 4 milliGRAM(s) IV Push once PRN  polyethylene glycol 3350 17 Gram(s) Oral two times a day  QUEtiapine 25 milliGRAM(s) Oral three times a day  QUEtiapine 12.5 milliGRAM(s) Oral every 6 hours PRN  senna Syrup 10 milliLiter(s) Oral at bedtime  sodium chloride 0.9% lock flush 10 milliLiter(s) IV Push every 1 hour PRN  trihexyphenidyl 2 milliGRAM(s) Oral three times a day      PHYSICAL EXAM:  GENERAL: NAD,   EYES: conjunctiva and sclera clear  ENMT: Moist mucous membranes  NECK: Supple, No JVD, Normal thyroid  CHEST/LUNG: non labored, cta b/l  HEART: Regular rate and rhythm; No murmurs, rubs, or gallops  ABDOMEN: Soft, Nontender, Nondistended; Bowel sounds present  EXTREMITIES:  2+ Peripheral Pulses, No clubbing, cyanosis, or edema  LYMPH: No lymphadenopathy noted  SKIN: No rashes or lesions    Consultant(s) Notes Reviewed:  [x ] YES  [ ] NO  Care Discussed with Consultants/Other Providers [ x] YES  [ ] NO    LABS:                        7.9    9.40  )-----------( 198      ( 21 Dec 2021 10:35 )             26.6     12-21    133<L>  |  95<L>  |  47<H>  ----------------------------<  123<H>  4.4   |  23  |  5.85<H>    Ca    10.2      21 Dec 2021 10:35          CAPILLARY BLOOD GLUCOSE      POCT Blood Glucose.: 75 mg/dL (21 Dec 2021 13:30)  POCT Blood Glucose.: 148 mg/dL (21 Dec 2021 08:14)  POCT Blood Glucose.: 75 mg/dL (21 Dec 2021 07:59)  POCT Blood Glucose.: 68 mg/dL (21 Dec 2021 07:57)  POCT Blood Glucose.: 74 mg/dL (21 Dec 2021 07:04)  POCT Blood Glucose.: 109 mg/dL (20 Dec 2021 21:18)  POCT Blood Glucose.: 147 mg/dL (20 Dec 2021 18:14)  POCT Blood Glucose.: 73 mg/dL (20 Dec 2021 17:45)              RADIOLOGY & ADDITIONAL TESTS:    Imaging Personally Reviewed:  [x ] YES  [ ] NO

## 2021-12-21 NOTE — PROGRESS NOTE ADULT - ASSESSMENT
Assessment  DMT2: 71y Male with DM T2 with hyperglycemia, A1C 6.4%, was on insulin at home, now on low-dose basal insulin and coverage, decreased Lantus dose yesterday, blood sugars are fluctuating/trending down, had mild hypoglycemia this AM, eating meals, NAD.  Hypothyroidism: Patient has no history thyroid disease, was not on any thyroid supplements, subclinical with low-normal FT4, lethargic,  started on synthroid 25 mcg po daily, repeat FT4 trending down, increased to synthroid  50 mcg po daily, FT4 improving.  CHF: on medications, stable, monitored.  HTN: Controlled,  on antihypertensive medications.  Parkinsons: on meds, monitored.  CKD: Monitor labs/BMP      Kelsie Reece MD  Cell: [phone]  Office: [phone]     Assessment  DMT2: 71y Male with DM T2 with hyperglycemia, A1C 6.4%, was on insulin at home, now on low-dose basal insulin and coverage, decreased Lantus dose yesterday, blood sugars are fluctuating/trending down, had mild hypoglycemia this AM, eating meals, NAD.  Hypothyroidism: Patient has no history thyroid disease, was not on any thyroid supplements, subclinical with low-normal FT4, lethargic,  started on synthroid 25 mcg po daily, repeat FT4  trending down, increased to synthroid  50 mcg po daily, FT4 improving.  CHF: on medications, stable, monitored.  HTN: Controlled,  on antihypertensive medications.  Parkinsons: on meds, monitored.  CKD: Monitor labs/BMP      Kelsie Reece MD  Cell: [phone]  Office: [phone]

## 2021-12-21 NOTE — PROGRESS NOTE ADULT - SUBJECTIVE AND OBJECTIVE BOX
SUBJECTIVE:  Resting comfortably    OBJECTIVE:  Vital Signs Last 24 Hrs  T(C): 36 (21 Dec 2021 09:54), Max: 36.6 (21 Dec 2021 05:01)  T(F): 96.8 (21 Dec 2021 09:54), Max: 97.8 (21 Dec 2021 05:01)  HR: 75 (21 Dec 2021 09:54) (75 - 108)  BP: 115/64 (21 Dec 2021 09:54) (91/47 - 126/73)  BP(mean): --  RR: 18 (21 Dec 2021 09:54) (17 - 18)  SpO2: 97% (21 Dec 2021 09:54) (97% - 100%)      12-20-21 @ 07:01  -  12-21-21 @ 07:00  --------------------------------------------------------  IN: 220 mL / OUT: 0 mL / NET: 220 mL        Physical Examination:  GEN: NAD, resting quietly  PULM: symmetric chest rise bilaterally, no increased WOB  EXTR: LUE AVF with palpable thrill      LABS:                        7.9    9.40  )-----------( 198      ( 21 Dec 2021 10:35 )             26.6       12-21    133<L>  |  95<L>  |  47<H>  ----------------------------<  123<H>  4.4   |  23  |  5.85<H>    Ca    10.2      21 Dec 2021 10:35

## 2021-12-21 NOTE — PROGRESS NOTE ADULT - ASSESSMENT
70yo M w/ PMHx of CAD (s/p CABG 2019), CKD (unknown stage), DM2, Parkinson's Disease, HTN, depression presents with new onset acute heart failure exacerbation, NSTEMI, started on hep gtt, developed bl RP bleed, acute anemia. sp MICU course for hemorrhagic shock, 10/28 sp IR embolization l4 and l5 lumbar artery. sp permacath and AVF.    # Acute systolic and diastolic heart failure  # NSTEMI, medical management  # ESRD, new HD  # Atrial flutter  # acute hypoxic resp failure  # hemorrhagic shock, left RP hematoma, acute blood loss anemia sp embolization l4 and l5 lumbar artery  # lupus anticoagulant +  TTE mod to severe LV SD, hypokinesis anterior wall  10/28 sp IR embolization l4 and l5 lumbar artery, h/h stable  HD via permacath per renal, midodrine during dialysis  sp L AVF   12/13 pt pulled out permacath  replaced with new permacath 12/14  per renal AVF needs more time to mature   pls fu vascular - AVF US noted- to review to decide if AVF can be accessed    # Parkinsons disease   # delirium  cont on depakote seroquel nc 50 tid and cymbalta  c/w trihexyphenidyl, carbidopa/levodopa   per neuro, probable early lewy body dementia related paranoia and agitation  psych fu    # DM2 (diabetes mellitus, type 2)  per endo  hba1c 6.4    # CAD (coronary artery disease)  # HTN  lopressor 50mg bid  c/w atorvastatin  asa on hold    PCP Dr. Simons    DNR/DNI      Dr Rogers will be covering  starting 12/22/21  please call [facility] @ [phone] for questions or concerns

## 2021-12-21 NOTE — PROGRESS NOTE ADULT - SUBJECTIVE AND OBJECTIVE BOX
Chief complaint  Patient is a 71y old  Male who presents with a chief complaint of leg swelling (21 Dec 2021 11:30)   Review of systems  Patient in bed, looks comfortable, had mild hypoglycemia this AM.    Labs and Fingersticks  CAPILLARY BLOOD GLUCOSE      POCT Blood Glucose.: 75 mg/dL (21 Dec 2021 13:30)  POCT Blood Glucose.: 148 mg/dL (21 Dec 2021 08:14)  POCT Blood Glucose.: 75 mg/dL (21 Dec 2021 07:59)  POCT Blood Glucose.: 68 mg/dL (21 Dec 2021 07:57)  POCT Blood Glucose.: 74 mg/dL (21 Dec 2021 07:04)  POCT Blood Glucose.: 109 mg/dL (20 Dec 2021 21:18)  POCT Blood Glucose.: 147 mg/dL (20 Dec 2021 18:14)  POCT Blood Glucose.: 73 mg/dL (20 Dec 2021 17:45)      Anion Gap, Serum: 15 (12-21 @ 10:35)  Anion Gap, Serum: 16 (12-20 @ 07:04)      Calcium, Total Serum: 10.2 (12-21 @ 10:35)  Calcium, Total Serum: 11.4 *H* (12-20 @ 07:04)          12-21    133<L>  |  95<L>  |  47<H>  ----------------------------<  123<H>  4.4   |  23  |  5.85<H>    Ca    10.2      21 Dec 2021 10:35                          7.9    9.40  )-----------( 198      ( 21 Dec 2021 10:35 )             26.6     Medications  MEDICATIONS  (STANDING):  atorvastatin 80 milliGRAM(s) Oral at bedtime  carbidopa/levodopa  25/100 2.5 Tablet(s) Oral <User Schedule>  carbidopa/levodopa  25/100 2 Tablet(s) Oral <User Schedule>  chlorhexidine 4% Liquid 1 Application(s) Topical <User Schedule>  cholecalciferol 1000 Unit(s) Oral daily  cinacalcet 60 milliGRAM(s) Oral daily  dextrose 40% Gel 15 Gram(s) Oral once  dextrose 5%. 1000 milliLiter(s) (50 mL/Hr) IV Continuous <Continuous>  dextrose 5%. 1000 milliLiter(s) (100 mL/Hr) IV Continuous <Continuous>  dextrose 50% Injectable 25 Gram(s) IV Push once  dextrose 50% Injectable 12.5 Gram(s) IV Push once  dextrose 50% Injectable 25 Gram(s) IV Push once  diVALproex Sprinkle 250 milliGRAM(s) Oral three times a day  DULoxetine 20 milliGRAM(s) Oral daily  folic acid 1 milliGRAM(s) Oral daily  glucagon  Injectable 1 milliGRAM(s) IntraMuscular once  insulin lispro (ADMELOG) corrective regimen sliding scale   SubCutaneous three times a day before meals  insulin lispro (ADMELOG) corrective regimen sliding scale   SubCutaneous at bedtime  levothyroxine 50 MICROGram(s) Oral daily  memantine 5 milliGRAM(s) Oral at bedtime  metoprolol tartrate 50 milliGRAM(s) Oral two times a day  midodrine 10 milliGRAM(s) Oral <User Schedule>  Nephro-celeste 1 Tablet(s) Oral daily  polyethylene glycol 3350 17 Gram(s) Oral two times a day  QUEtiapine 25 milliGRAM(s) Oral three times a day  senna Syrup 10 milliLiter(s) Oral at bedtime  trihexyphenidyl 2 milliGRAM(s) Oral three times a day      Physical Exam  General: Patient comfortable in bed  Vital Signs Last 12 Hrs  T(F): 97.2 (12-21-21 @ 13:18), Max: 97.8 (12-21-21 @ 05:01)  HR: 96 (12-21-21 @ 13:18) (75 - 96)  BP: 118/73 (12-21-21 @ 13:18) (91/47 - 119/69)  BP(mean): --  RR: 17 (12-21-21 @ 13:18) (17 - 18)  SpO2: 96% (12-21-21 @ 13:18) (96% - 100%)  Neck: No palpable thyroid nodules.  CVS: S1S2, No murmurs  Respiratory: No wheezing, no crepitations  GI: Abdomen soft, bowel sounds positive  Musculoskeletal:  edema lower extremities.   Skin: No skin rashes, no ecchymosis    Diagnostics    Free Thyroxine, Serum: AM Sched. Collection: 16-Dec-2021 06:00 (12-15 @ 14:39)  Free Thyroxine, Serum: AM Sched. Collection: 08-Dec-2021 06:00 (12-07 @ 14:26)             Chief complaint  Patient is a 71y old  Male who presents with a chief complaint of leg swelling (21 Dec 2021 11:30)   Review of systems  Patient in bed, looks comfortable,  had mild hypoglycemia this AM.    Labs and Fingersticks  CAPILLARY BLOOD GLUCOSE      POCT Blood Glucose.: 75 mg/dL (21 Dec 2021 13:30)  POCT Blood Glucose.: 148 mg/dL (21 Dec 2021 08:14)  POCT Blood Glucose.: 75 mg/dL (21 Dec 2021 07:59)  POCT Blood Glucose.: 68 mg/dL (21 Dec 2021 07:57)  POCT Blood Glucose.: 74 mg/dL (21 Dec 2021 07:04)  POCT Blood Glucose.: 109 mg/dL (20 Dec 2021 21:18)  POCT Blood Glucose.: 147 mg/dL (20 Dec 2021 18:14)  POCT Blood Glucose.: 73 mg/dL (20 Dec 2021 17:45)      Anion Gap, Serum: 15 (12-21 @ 10:35)  Anion Gap, Serum: 16 (12-20 @ 07:04)      Calcium, Total Serum: 10.2 (12-21 @ 10:35)  Calcium, Total Serum: 11.4 *H* (12-20 @ 07:04)          12-21    133<L>  |  95<L>  |  47<H>  ----------------------------<  123<H>  4.4   |  23  |  5.85<H>    Ca    10.2      21 Dec 2021 10:35                          7.9    9.40  )-----------( 198      ( 21 Dec 2021 10:35 )             26.6     Medications  MEDICATIONS  (STANDING):  atorvastatin 80 milliGRAM(s) Oral at bedtime  carbidopa/levodopa  25/100 2.5 Tablet(s) Oral <User Schedule>  carbidopa/levodopa  25/100 2 Tablet(s) Oral <User Schedule>  chlorhexidine 4% Liquid 1 Application(s) Topical <User Schedule>  cholecalciferol 1000 Unit(s) Oral daily  cinacalcet 60 milliGRAM(s) Oral daily  dextrose 40% Gel 15 Gram(s) Oral once  dextrose 5%. 1000 milliLiter(s) (50 mL/Hr) IV Continuous <Continuous>  dextrose 5%. 1000 milliLiter(s) (100 mL/Hr) IV Continuous <Continuous>  dextrose 50% Injectable 25 Gram(s) IV Push once  dextrose 50% Injectable 12.5 Gram(s) IV Push once  dextrose 50% Injectable 25 Gram(s) IV Push once  diVALproex Sprinkle 250 milliGRAM(s) Oral three times a day  DULoxetine 20 milliGRAM(s) Oral daily  folic acid 1 milliGRAM(s) Oral daily  glucagon  Injectable 1 milliGRAM(s) IntraMuscular once  insulin lispro (ADMELOG) corrective regimen sliding scale   SubCutaneous three times a day before meals  insulin lispro (ADMELOG) corrective regimen sliding scale   SubCutaneous at bedtime  levothyroxine 50 MICROGram(s) Oral daily  memantine 5 milliGRAM(s) Oral at bedtime  metoprolol tartrate 50 milliGRAM(s) Oral two times a day  midodrine 10 milliGRAM(s) Oral <User Schedule>  Nephro-celeste 1 Tablet(s) Oral daily  polyethylene glycol 3350 17 Gram(s) Oral two times a day  QUEtiapine 25 milliGRAM(s) Oral three times a day  senna Syrup 10 milliLiter(s) Oral at bedtime  trihexyphenidyl 2 milliGRAM(s) Oral three times a day      Physical Exam  General: Patient comfortable in bed  Vital Signs Last 12 Hrs  T(F): 97.2 (12-21-21 @ 13:18), Max: 97.8 (12-21-21 @ 05:01)  HR: 96 (12-21-21 @ 13:18) (75 - 96)  BP: 118/73 (12-21-21 @ 13:18) (91/47 - 119/69)  BP(mean): --  RR: 17 (12-21-21 @ 13:18) (17 - 18)  SpO2: 96% (12-21-21 @ 13:18) (96% - 100%)  Neck: No palpable thyroid nodules.  CVS: S1S2, No murmurs  Respiratory: No wheezing, no crepitations  GI: Abdomen soft, bowel sounds positive  Musculoskeletal:  edema lower extremities.   Skin: No skin rashes, no ecchymosis    Diagnostics    Free Thyroxine, Serum: AM Sched. Collection: 16-Dec-2021 06:00 (12-15 @ 14:39)  Free Thyroxine, Serum: AM Sched. Collection: 08-Dec-2021 06:00 (12-07 @ 14:26)

## 2021-12-21 NOTE — PROGRESS NOTE ADULT - SUBJECTIVE AND OBJECTIVE BOX
[facility] [phone]        Patient seen and examined in bed.  He was hypoglycemic earlier        MEDICATIONS  (STANDING):  atorvastatin 80 milliGRAM(s) Oral at bedtime  carbidopa/levodopa  25/100 2.5 Tablet(s) Oral <User Schedule>  carbidopa/levodopa  25/100 2 Tablet(s) Oral <User Schedule>  chlorhexidine 4% Liquid 1 Application(s) Topical <User Schedule>  cholecalciferol 1000 Unit(s) Oral daily  cinacalcet 60 milliGRAM(s) Oral daily  dextrose 40% Gel 15 Gram(s) Oral once  dextrose 5%. 1000 milliLiter(s) (50 mL/Hr) IV Continuous <Continuous>  dextrose 5%. 1000 milliLiter(s) (100 mL/Hr) IV Continuous <Continuous>  dextrose 50% Injectable 25 Gram(s) IV Push once  dextrose 50% Injectable 12.5 Gram(s) IV Push once  dextrose 50% Injectable 25 Gram(s) IV Push once  diVALproex Sprinkle 250 milliGRAM(s) Oral three times a day  DULoxetine 20 milliGRAM(s) Oral daily  folic acid 1 milliGRAM(s) Oral daily  glucagon  Injectable 1 milliGRAM(s) IntraMuscular once  insulin lispro (ADMELOG) corrective regimen sliding scale   SubCutaneous three times a day before meals  insulin lispro (ADMELOG) corrective regimen sliding scale   SubCutaneous at bedtime  levothyroxine 50 MICROGram(s) Oral daily  memantine 5 milliGRAM(s) Oral at bedtime  metoprolol tartrate 50 milliGRAM(s) Oral two times a day  midodrine 10 milliGRAM(s) Oral <User Schedule>  Nephro-celeste 1 Tablet(s) Oral daily  polyethylene glycol 3350 17 Gram(s) Oral two times a day  QUEtiapine 25 milliGRAM(s) Oral three times a day  senna Syrup 10 milliLiter(s) Oral at bedtime  trihexyphenidyl 2 milliGRAM(s) Oral three times a day      VITAL:  T(C): , Max: 36.6 (12-21-21 @ 05:01)  T(F): , Max: 97.8 (12-21-21 @ 05:01)  HR: 75 (12-21-21 @ 09:54)  BP: 115/64 (12-21-21 @ 09:54)  BP(mean): --  RR: 18 (12-21-21 @ 09:54)  SpO2: 97% (12-21-21 @ 09:54)  Wt(kg): --    I and O's:    12-20 @ 07:01  -  12-21 @ 07:00  --------------------------------------------------------  IN: 220 mL / OUT: 0 mL / NET: 220 mL          PHYSICAL EXAM:    Constitutional: NAD  Neck:  No JVD  Respiratory: CTAB/L  Cardiovascular: S1 and S2  Gastrointestinal: BS+, soft, NT/ND  Extremities: No peripheral edema  Neurological: A/O x 3, no focal deficits  Psychiatric: Normal mood, normal affect  : No Javier  Skin: No rashes  Access: perm cath;  avf     LABS:                        7.9    9.40  )-----------( 198      ( 21 Dec 2021 10:35 )             26.6     12-20    135  |  95<L>  |  42<H>  ----------------------------<  86  4.4   |  24  |  5.04<H>    Ca    11.4<H>      20 Dec 2021 07:04            Urine Studies:          RADIOLOGY & ADDITIONAL STUDIES:

## 2021-12-21 NOTE — PHARMACOTHERAPY INTERVENTION NOTE - COMMENTS
[facility] Medication Review    MEDICATIONS  (STANDING):  atorvastatin 80 milliGRAM(s) Oral at bedtime  metoprolol tartrate 50 milliGRAM(s) Oral two times a day    Currently not on diuretic management inpatient.    Patient not AAO upon interview
[facility] Medication Education     Heart failure education provided to patient/caregiver.  -Reviewed doses and possible side effects for current heart failure medications:    Medication adherence review:  -Who administers the patient’s medications outpatient? Patient  -Is the patient able to name/identify his/her heart failure medications? Always  -Is the patient able to identify the dosing frequency of his/her heart failure medications? Always  -How often does the patient report missing a dose of heart failure medication? none  -Is the patient able to afford his/her heart failure medications? Yes      Additional information reviewed:  Low salt diet/fluid restriction  Blood pressure control  Dyslipidemia control  Diabetes control  Smoking cessation  Anticoagulant medication education  Antiplatelet medication education  Etc.      Answered all of the patient’s questions to the best of my ability. Patient exhibited understanding of heart failure medication regimen and management.    Glenna Kwon, Pharm D.  [phone]

## 2021-12-21 NOTE — PROGRESS NOTE ADULT - PROBLEM SELECTOR PLAN 1
Will DC Lantus for now and continue coverage scale ac/hs. Will continue monitoring FS and FU.   for compliance with consistent low carb diet.

## 2021-12-21 NOTE — PROGRESS NOTE ADULT - ASSESSMENT
71M CAD s/p CABG, CKD, DM, p/w HF, NSTEMI c/b RP bleed, hemorrhagic shock s/p IR embolization of L4/5 lumbar arteries. History of L brachiocephalic AVF creation 11/12/21. Vascular consulted to assess maturity.    Recommendation:  - HD duplex shows AVF mature. OK to start using it for HD today.    Vascular Surgery [id number]

## 2021-12-21 NOTE — PROGRESS NOTE ADULT - ASSESSMENT
72yo M w/ PMHx of CAD (s/p CABG 2019), CKD (unknown stage), DM2, Parkinson's Disease, HTN, depression presents with bilateral leg swelling  ESRD   Severe LV dysfunction; A flutter         1 Palliation - Intermittent confusion   2 Renal-  Plan for HD today  with EPO administration  3 CVS- BP controlled at present, on Midodrine with parameters, Lopressor for heart rate control   4 Anemia -  Retacrit 10k units with HD today;   5 Vasc - s/p  LUE AVF--Immature(this will take weeks to months to mature) ;  Perm cath   6 Endo - Off the lantus at present due to hypoglycemia         Sayed [facility]   [phone]

## 2021-12-22 NOTE — PROGRESS NOTE ADULT - SUBJECTIVE AND OBJECTIVE BOX
[facility] [phone]        Patient seen and examined in bed.  He was sleeping this am         MEDICATIONS  (STANDING):  atorvastatin 80 milliGRAM(s) Oral at bedtime  carbidopa/levodopa  25/100 2.5 Tablet(s) Oral <User Schedule>  carbidopa/levodopa  25/100 2 Tablet(s) Oral <User Schedule>  chlorhexidine 4% Liquid 1 Application(s) Topical <User Schedule>  cholecalciferol 1000 Unit(s) Oral daily  cinacalcet 60 milliGRAM(s) Oral daily  dextrose 40% Gel 15 Gram(s) Oral once  dextrose 5%. 1000 milliLiter(s) (50 mL/Hr) IV Continuous <Continuous>  dextrose 5%. 1000 milliLiter(s) (100 mL/Hr) IV Continuous <Continuous>  dextrose 50% Injectable 25 Gram(s) IV Push once  dextrose 50% Injectable 12.5 Gram(s) IV Push once  dextrose 50% Injectable 25 Gram(s) IV Push once  diVALproex Sprinkle 250 milliGRAM(s) Oral three times a day  DULoxetine 20 milliGRAM(s) Oral daily  folic acid 1 milliGRAM(s) Oral daily  glucagon  Injectable 1 milliGRAM(s) IntraMuscular once  insulin lispro (ADMELOG) corrective regimen sliding scale   SubCutaneous three times a day before meals  insulin lispro (ADMELOG) corrective regimen sliding scale   SubCutaneous at bedtime  levothyroxine 50 MICROGram(s) Oral daily  memantine 5 milliGRAM(s) Oral at bedtime  metoprolol tartrate 50 milliGRAM(s) Oral two times a day  midodrine 10 milliGRAM(s) Oral <User Schedule>  Nephro-celeste 1 Tablet(s) Oral daily  polyethylene glycol 3350 17 Gram(s) Oral two times a day  QUEtiapine 25 milliGRAM(s) Oral three times a day  senna Syrup 10 milliLiter(s) Oral at bedtime  trihexyphenidyl 2 milliGRAM(s) Oral three times a day      VITAL:  T(C): , Max: 36.6 (12-22-21 @ 05:10)  T(F): , Max: 97.8 (12-22-21 @ 05:10)  HR: 74 (12-22-21 @ 05:10)  BP: 116/72 (12-22-21 @ 05:10)  BP(mean): --  RR: 17 (12-22-21 @ 05:10)  SpO2: 97% (12-22-21 @ 05:10)  Wt(kg): --    I and O's:    12-21 @ 07:01  -  12-22 @ 07:00  --------------------------------------------------------  IN: 380 mL / OUT: 200 mL / NET: 180 mL          PHYSICAL EXAM:    Constitutional: NAD  Neck:  No JVD  Respiratory: CTAB/L  Cardiovascular: S1 and S2  Gastrointestinal: BS+, soft, NT/ND  Extremities: No peripheral edema  Neurological: A/O x 3, no focal deficits  Psychiatric: Normal mood, normal affect  : No Javier  Skin: No rashes  Access: perm cath and avf     LABS:                        9.8    9.04  )-----------( 200      ( 22 Dec 2021 07:43 )             32.1     12-22    134<L>  |  95<L>  |  29<H>  ----------------------------<  84  4.5   |  25  |  4.28<H>    Ca    11.3<H>      22 Dec 2021 07:43            Urine Studies:          RADIOLOGY & ADDITIONAL STUDIES:

## 2021-12-22 NOTE — PROGRESS NOTE ADULT - SUBJECTIVE AND OBJECTIVE BOX
SUBJECTIVE / OVERNIGHT EVENTS:    INCOMPLETE NOTE      --------------------------------------------------------------------------------------------  LABS:                        9.8    9.04  )-----------( 200      ( 22 Dec 2021 07:43 )             32.1     12-22    134<L>  |  95<L>  |  29<H>  ----------------------------<  84  4.5   |  25  |  4.28<H>    Ca    11.3<H>      22 Dec 2021 07:43        CAPILLARY BLOOD GLUCOSE      POCT Blood Glucose.: 100 mg/dL (22 Dec 2021 09:21)  POCT Blood Glucose.: 97 mg/dL (21 Dec 2021 21:07)  POCT Blood Glucose.: 115 mg/dL (21 Dec 2021 17:16)  POCT Blood Glucose.: 75 mg/dL (21 Dec 2021 13:30)            RADIOLOGY & ADDITIONAL TESTS:    Imaging Personally Reviewed:  [x] YES  [ ] NO    Consultant(s) Notes Reviewed:  [x] YES  [ ] NO    MEDICATIONS  (STANDING):  atorvastatin 80 milliGRAM(s) Oral at bedtime  carbidopa/levodopa  25/100 2.5 Tablet(s) Oral <User Schedule>  carbidopa/levodopa  25/100 2 Tablet(s) Oral <User Schedule>  chlorhexidine 4% Liquid 1 Application(s) Topical <User Schedule>  cholecalciferol 1000 Unit(s) Oral daily  cinacalcet 60 milliGRAM(s) Oral daily  dextrose 40% Gel 15 Gram(s) Oral once  dextrose 5%. 1000 milliLiter(s) (50 mL/Hr) IV Continuous <Continuous>  dextrose 5%. 1000 milliLiter(s) (100 mL/Hr) IV Continuous <Continuous>  dextrose 50% Injectable 25 Gram(s) IV Push once  dextrose 50% Injectable 12.5 Gram(s) IV Push once  dextrose 50% Injectable 25 Gram(s) IV Push once  diVALproex Sprinkle 250 milliGRAM(s) Oral three times a day  DULoxetine 20 milliGRAM(s) Oral daily  folic acid 1 milliGRAM(s) Oral daily  glucagon  Injectable 1 milliGRAM(s) IntraMuscular once  insulin lispro (ADMELOG) corrective regimen sliding scale   SubCutaneous three times a day before meals  insulin lispro (ADMELOG) corrective regimen sliding scale   SubCutaneous at bedtime  levothyroxine 50 MICROGram(s) Oral daily  memantine 5 milliGRAM(s) Oral at bedtime  metoprolol tartrate 50 milliGRAM(s) Oral two times a day  midodrine 10 milliGRAM(s) Oral <User Schedule>  Nephro-celeste 1 Tablet(s) Oral daily  polyethylene glycol 3350 17 Gram(s) Oral two times a day  QUEtiapine 25 milliGRAM(s) Oral three times a day  senna Syrup 10 milliLiter(s) Oral at bedtime  trihexyphenidyl 2 milliGRAM(s) Oral three times a day    MEDICATIONS  (PRN):  acetaminophen     Tablet .. 650 milliGRAM(s) Oral every 6 hours PRN Mild Pain (1 - 3)  albuterol/ipratropium for Nebulization 3 milliLiter(s) Nebulizer every 6 hours PRN Shortness of Breath and/or Wheezing  diVALproex Sprinkle 125 milliGRAM(s) Oral every 8 hours PRN Agitation  guaiFENesin Oral Liquid (Sugar-Free) 200 milliGRAM(s) Oral every 6 hours PRN Cough  LORazepam     Tablet 0.25 milliGRAM(s) Oral every 8 hours PRN Agitation (severe agitation ONLY)  ondansetron Injectable 4 milliGRAM(s) IV Push once PRN Nausea and/or Vomiting  QUEtiapine 12.5 milliGRAM(s) Oral every 6 hours PRN agitation  sodium chloride 0.9% lock flush 10 milliLiter(s) IV Push every 1 hour PRN Pre/post blood products, medications, blood draw, and to maintain line patency      Care Discussed with Consultants/Other Providers [x] YES  [ ] NO    Vital Signs Last 24 Hrs  T(C): 36.6 (22 Dec 2021 05:10), Max: 36.6 (22 Dec 2021 05:10)  T(F): 97.8 (22 Dec 2021 05:10), Max: 97.8 (22 Dec 2021 05:10)  HR: 74 (22 Dec 2021 05:10) (74 - 98)  BP: 116/72 (22 Dec 2021 05:10) (106/67 - 122/68)  BP(mean): --  RR: 17 (22 Dec 2021 05:10) (17 - 18)  SpO2: 97% (22 Dec 2021 05:10) (96% - 100%)  I&O's Summary    21 Dec 2021 07:01  -  22 Dec 2021 07:00  --------------------------------------------------------  IN: 380 mL / OUT: 200 mL / NET: 180 mL           SUBJECTIVE / OVERNIGHT EVENTS:    ripped mitten off. awake confused.  restless.    --------------------------------------------------------------------------------------------  LABS:                        9.8    9.04  )-----------( 200      ( 22 Dec 2021 07:43 )             32.1     12-22    134<L>  |  95<L>  |  29<H>  ----------------------------<  84  4.5   |  25  |  4.28<H>    Ca    11.3<H>      22 Dec 2021 07:43        CAPILLARY BLOOD GLUCOSE      POCT Blood Glucose.: 100 mg/dL (22 Dec 2021 09:21)  POCT Blood Glucose.: 97 mg/dL (21 Dec 2021 21:07)  POCT Blood Glucose.: 115 mg/dL (21 Dec 2021 17:16)  POCT Blood Glucose.: 75 mg/dL (21 Dec 2021 13:30)            RADIOLOGY & ADDITIONAL TESTS:    Imaging Personally Reviewed:  [x] YES  [ ] NO    Consultant(s) Notes Reviewed:  [x] YES  [ ] NO    MEDICATIONS  (STANDING):  atorvastatin 80 milliGRAM(s) Oral at bedtime  carbidopa/levodopa  25/100 2.5 Tablet(s) Oral <User Schedule>  carbidopa/levodopa  25/100 2 Tablet(s) Oral <User Schedule>  chlorhexidine 4% Liquid 1 Application(s) Topical <User Schedule>  cholecalciferol 1000 Unit(s) Oral daily  cinacalcet 60 milliGRAM(s) Oral daily  dextrose 40% Gel 15 Gram(s) Oral once  dextrose 5%. 1000 milliLiter(s) (50 mL/Hr) IV Continuous <Continuous>  dextrose 5%. 1000 milliLiter(s) (100 mL/Hr) IV Continuous <Continuous>  dextrose 50% Injectable 25 Gram(s) IV Push once  dextrose 50% Injectable 12.5 Gram(s) IV Push once  dextrose 50% Injectable 25 Gram(s) IV Push once  diVALproex Sprinkle 250 milliGRAM(s) Oral three times a day  DULoxetine 20 milliGRAM(s) Oral daily  folic acid 1 milliGRAM(s) Oral daily  glucagon  Injectable 1 milliGRAM(s) IntraMuscular once  insulin lispro (ADMELOG) corrective regimen sliding scale   SubCutaneous three times a day before meals  insulin lispro (ADMELOG) corrective regimen sliding scale   SubCutaneous at bedtime  levothyroxine 50 MICROGram(s) Oral daily  memantine 5 milliGRAM(s) Oral at bedtime  metoprolol tartrate 50 milliGRAM(s) Oral two times a day  midodrine 10 milliGRAM(s) Oral <User Schedule>  Nephro-celeste 1 Tablet(s) Oral daily  polyethylene glycol 3350 17 Gram(s) Oral two times a day  QUEtiapine 25 milliGRAM(s) Oral three times a day  senna Syrup 10 milliLiter(s) Oral at bedtime  trihexyphenidyl 2 milliGRAM(s) Oral three times a day    MEDICATIONS  (PRN):  acetaminophen     Tablet .. 650 milliGRAM(s) Oral every 6 hours PRN Mild Pain (1 - 3)  albuterol/ipratropium for Nebulization 3 milliLiter(s) Nebulizer every 6 hours PRN Shortness of Breath and/or Wheezing  diVALproex Sprinkle 125 milliGRAM(s) Oral every 8 hours PRN Agitation  guaiFENesin Oral Liquid (Sugar-Free) 200 milliGRAM(s) Oral every 6 hours PRN Cough  LORazepam     Tablet 0.25 milliGRAM(s) Oral every 8 hours PRN Agitation (severe agitation ONLY)  ondansetron Injectable 4 milliGRAM(s) IV Push once PRN Nausea and/or Vomiting  QUEtiapine 12.5 milliGRAM(s) Oral every 6 hours PRN agitation  sodium chloride 0.9% lock flush 10 milliLiter(s) IV Push every 1 hour PRN Pre/post blood products, medications, blood draw, and to maintain line patency      Care Discussed with Consultants/Other Providers [x] YES  [ ] NO    Vital Signs Last 24 Hrs  T(C): 36.6 (22 Dec 2021 05:10), Max: 36.6 (22 Dec 2021 05:10)  T(F): 97.8 (22 Dec 2021 05:10), Max: 97.8 (22 Dec 2021 05:10)  HR: 74 (22 Dec 2021 05:10) (74 - 98)  BP: 116/72 (22 Dec 2021 05:10) (106/67 - 122/68)  BP(mean): --  RR: 17 (22 Dec 2021 05:10) (17 - 18)  SpO2: 97% (22 Dec 2021 05:10) (96% - 100%)  I&O's Summary    21 Dec 2021 07:01  -  22 Dec 2021 07:00  --------------------------------------------------------  IN: 380 mL / OUT: 200 mL / NET: 180 mL        GENERAL: restless, awake, not oriented  HEAD:  Atraumatic, Normocephalic  neck: right permacath CDI  CHEST/LUNG: CTABL, No wheeze  HEART: Regular rate and rhythm; No murmurs, rubs, or gallops  ABDOMEN: Soft, Nontender, Nondistended; Bowel sounds present  EXTREMITIES:  2+ Peripheral Pulses, No edema, left arm AVF

## 2021-12-22 NOTE — PROGRESS NOTE ADULT - ASSESSMENT
70yo M w/ PMHx of CAD (s/p CABG 2019), CKD (unknown stage), DM2, Parkinson's Disease, HTN, depression presents with bilateral leg swelling  ESRD   Severe LV dysfunction; A flutter         1 Palliation - Intermittent confusion ;  Can the seroquel be tapered....Seems to be too lethargic   2 Renal-  Plan for HD in am  with EPO administration  3 CVS- BP controlled at present, on Midodrine with parameters, Lopressor for heart rate control   4 Anemia -  Retacrit 10k units with HD today;   5 Vasc - s/p  LUE AVF--Immature(this will take weeks to months to mature) ;  Perm cath   6 Endo - Off the lantus at present due to hypoglycemia   7 GI-Is Remeron an option for appetite ?        Sayed [facility]   [phone]

## 2021-12-22 NOTE — PROGRESS NOTE ADULT - PROBLEM SELECTOR PLAN 1
Will continue coverage scale ac/hs. Will continue monitoring FS and FU.   for compliance with consistent low carb diet.

## 2021-12-22 NOTE — PROGRESS NOTE ADULT - ASSESSMENT
70yo M w/ PMHx of CAD (s/p CABG 2019), CKD (unknown stage), DM2, Parkinson's Disease, HTN, depression presents with new onset acute heart failure exacerbation, NSTEMI, started on hep gtt, developed bl RP bleed, acute anemia. sp MICU course for hemorrhagic shock, 10/28 sp IR embolization l4 and l5 lumbar artery. sp permacath and AVF.    # Acute systolic and diastolic heart failure  # NSTEMI, medical management  # ESRD, new HD  # Atrial flutter  # acute hypoxic resp failure  # hemorrhagic shock, left RP hematoma, acute blood loss anemia sp embolization l4 and l5 lumbar artery  # lupus anticoagulant +  TTE mod to severe LV SD, hypokinesis anterior wall  10/28 sp IR embolization l4 and l5 lumbar artery, h/h stable  HD via permacath per renal, midodrine during dialysis  sp L AVF   12/13 pt pulled out permacath  replaced with new permacath 12/14  per renal AVF needs more time to mature   pls fu vascular - AVF US noted- to review to decide if AVF can be accessed    # Parkinsons disease   # delirium  cont on depakote seroquel nc 50 tid and cymbalta  c/w trihexyphenidyl, carbidopa/levodopa   per neuro, probable early lewy body dementia related paranoia and agitation  psych fu    # DM2 (diabetes mellitus, type 2)  per endo  hba1c 6.4    # CAD (coronary artery disease)  # HTN  lopressor 50mg bid  c/w atorvastatin  asa on hold    PCP Dr. Simons    DNR/DNI   70yo M w/ PMHx of CAD (s/p CABG 2019), CKD (unknown stage), DM2, Parkinson's Disease, HTN, depression presents with new onset acute heart failure exacerbation, NSTEMI, started on hep gtt, developed bl RP bleed, acute anemia. sp MICU course for hemorrhagic shock, 10/28 sp IR embolization l4 and l5 lumbar artery. sp permacath and AVF.    # chronic systolic and diastolic heart failure  # NSTEMI, medical management  # ESRD, new HD  # Atrial flutter  # acute hypoxic resp failure  # hemorrhagic shock, left RP hematoma, acute blood loss anemia sp embolization l4 and l5 lumbar artery 10/28  # lupus anticoagulant +  HD via permacath per renal, midodrine during dialysis  sp L AVF   12/13 pt pulled out permacath, replaced with new permacath 12/14  per renal, AVF needs more time to mature    # Parkinson/s disease   # delirium  cont on depakote seroquel and cymbalta  c/w trihexyphenidyl, carbidopa/levodopa   per neuro, probable early lewy body dementia related paranoia and agitation  psych fu    # DM2 (diabetes mellitus, type 2)  per endo  hba1c 6.4    # CAD (coronary artery disease)  # HTN  lopressor 50mg bid  c/w atorvastatin  asa on hold    PCP Dr. Simons    DNR/DNI

## 2021-12-22 NOTE — PROGRESS NOTE ADULT - ASSESSMENT
Assessment  DMT2: 71y Male with DM T2 with hyperglycemia, A1C 6.4%, was on insulin at home, now on insulin coverage only, decreased dose s/p prior hypoglycemia, blood sugars are improving, no new hypoglycemic episodes, NAD.  Hypothyroidism: Patient has no history thyroid disease, was not on any thyroid supplements, subclinical with low-normal FT4, lethargic, started on synthroid 25 mcg po daily, repeat FT4  trending down, increased to synthroid  50 mcg po daily, FT4 improving.  CHF: on medications, stable, monitored.  HTN: Controlled,  on antihypertensive medications.  Parkinsons: on meds, monitored.  CKD: Monitor labs/BMP      Kelsie Reece MD  Cell: [phone]  Office: [phone]     Assessment  DMT2: 71y Male with DM T2 with hyperglycemia, A1C 6.4%, was on insulin at home, now on insulin coverage only, decreased dose s/p prior hypoglycemia, blood sugars are improving, no new hypoglycemic  episodes, NAD.  Hypothyroidism: Patient has no history thyroid disease, was not on any thyroid supplements, subclinical with low-normal FT4, lethargic, started on synthroid 25 mcg po daily, repeat FT4   trending down, increased to synthroid  50 mcg po daily, FT4 improving.  CHF: on medications, stable, monitored.  HTN: Controlled,  on antihypertensive medications.  Parkinsons: on meds, monitored.  CKD: Monitor labs/BMP      Kelsie Reece MD  Cell: [phone]  Office: [phone]

## 2021-12-22 NOTE — PROGRESS NOTE ADULT - SUBJECTIVE AND OBJECTIVE BOX
Chief complaint  Patient is a 71y old  Male who presents with a chief complaint of leg swelling (22 Dec 2021 11:54)   Review of systems  Patient in bed, looks comfortable, no hypoglycemic episodes.    Labs and Fingersticks  CAPILLARY BLOOD GLUCOSE      POCT Blood Glucose.: 117 mg/dL (22 Dec 2021 13:18)  POCT Blood Glucose.: 100 mg/dL (22 Dec 2021 09:21)  POCT Blood Glucose.: 97 mg/dL (21 Dec 2021 21:07)  POCT Blood Glucose.: 115 mg/dL (21 Dec 2021 17:16)    Medications  MEDICATIONS  (STANDING):  atorvastatin 80 milliGRAM(s) Oral at bedtime  carbidopa/levodopa  25/100 2.5 Tablet(s) Oral <User Schedule>  carbidopa/levodopa  25/100 2 Tablet(s) Oral <User Schedule>  chlorhexidine 4% Liquid 1 Application(s) Topical <User Schedule>  cholecalciferol 1000 Unit(s) Oral daily  cinacalcet 60 milliGRAM(s) Oral daily  dextrose 40% Gel 15 Gram(s) Oral once  dextrose 5%. 1000 milliLiter(s) (50 mL/Hr) IV Continuous <Continuous>  dextrose 5%. 1000 milliLiter(s) (100 mL/Hr) IV Continuous <Continuous>  dextrose 50% Injectable 25 Gram(s) IV Push once  dextrose 50% Injectable 12.5 Gram(s) IV Push once  dextrose 50% Injectable 25 Gram(s) IV Push once  diVALproex Sprinkle 250 milliGRAM(s) Oral three times a day  DULoxetine 20 milliGRAM(s) Oral daily  folic acid 1 milliGRAM(s) Oral daily  glucagon  Injectable 1 milliGRAM(s) IntraMuscular once  insulin lispro (ADMELOG) corrective regimen sliding scale   SubCutaneous three times a day before meals  insulin lispro (ADMELOG) corrective regimen sliding scale   SubCutaneous at bedtime  levothyroxine 50 MICROGram(s) Oral daily  memantine 5 milliGRAM(s) Oral at bedtime  metoprolol tartrate 50 milliGRAM(s) Oral two times a day  midodrine 10 milliGRAM(s) Oral <User Schedule>  Nephro-celeste 1 Tablet(s) Oral daily  polyethylene glycol 3350 17 Gram(s) Oral two times a day  QUEtiapine 25 milliGRAM(s) Oral three times a day  senna Syrup 10 milliLiter(s) Oral at bedtime  trihexyphenidyl 2 milliGRAM(s) Oral three times a day      Physical Exam  General: Patient comfortable in bed  Vital Signs Last 12 Hrs  T(F): 97.5 (12-22-21 @ 13:14), Max: 97.8 (12-22-21 @ 05:10)  HR: 104 (12-22-21 @ 13:14) (74 - 104)  BP: 96/55 (12-22-21 @ 13:14) (96/55 - 116/72)  BP(mean): --  RR: 18 (12-22-21 @ 13:14) (17 - 18)  SpO2: 95% (12-22-21 @ 13:14) (95% - 97%)    Diagnostics    Free Thyroxine, Serum: AM Sched. Collection: 16-Dec-2021 06:00 (12-15 @ 14:39)  Free Thyroxine, Serum: AM Sched. Collection: 08-Dec-2021 06:00 (12-07 @ 14:26)             Chief complaint  Patient is a 71y old  Male who presents with a chief complaint of leg swelling (22 Dec 2021 11:54)   Review of systems  Patient in bed, looks comfortable, no  hypoglycemic episodes.    Labs and Fingersticks  CAPILLARY BLOOD GLUCOSE      POCT Blood Glucose.: 117 mg/dL (22 Dec 2021 13:18)  POCT Blood Glucose.: 100 mg/dL (22 Dec 2021 09:21)  POCT Blood Glucose.: 97 mg/dL (21 Dec 2021 21:07)  POCT Blood Glucose.: 115 mg/dL (21 Dec 2021 17:16)    Medications  MEDICATIONS  (STANDING):  atorvastatin 80 milliGRAM(s) Oral at bedtime  carbidopa/levodopa  25/100 2.5 Tablet(s) Oral <User Schedule>  carbidopa/levodopa  25/100 2 Tablet(s) Oral <User Schedule>  chlorhexidine 4% Liquid 1 Application(s) Topical <User Schedule>  cholecalciferol 1000 Unit(s) Oral daily  cinacalcet 60 milliGRAM(s) Oral daily  dextrose 40% Gel 15 Gram(s) Oral once  dextrose 5%. 1000 milliLiter(s) (50 mL/Hr) IV Continuous <Continuous>  dextrose 5%. 1000 milliLiter(s) (100 mL/Hr) IV Continuous <Continuous>  dextrose 50% Injectable 25 Gram(s) IV Push once  dextrose 50% Injectable 12.5 Gram(s) IV Push once  dextrose 50% Injectable 25 Gram(s) IV Push once  diVALproex Sprinkle 250 milliGRAM(s) Oral three times a day  DULoxetine 20 milliGRAM(s) Oral daily  folic acid 1 milliGRAM(s) Oral daily  glucagon  Injectable 1 milliGRAM(s) IntraMuscular once  insulin lispro (ADMELOG) corrective regimen sliding scale   SubCutaneous three times a day before meals  insulin lispro (ADMELOG) corrective regimen sliding scale   SubCutaneous at bedtime  levothyroxine 50 MICROGram(s) Oral daily  memantine 5 milliGRAM(s) Oral at bedtime  metoprolol tartrate 50 milliGRAM(s) Oral two times a day  midodrine 10 milliGRAM(s) Oral <User Schedule>  Nephro-celeste 1 Tablet(s) Oral daily  polyethylene glycol 3350 17 Gram(s) Oral two times a day  QUEtiapine 25 milliGRAM(s) Oral three times a day  senna Syrup 10 milliLiter(s) Oral at bedtime  trihexyphenidyl 2 milliGRAM(s) Oral three times a day      Physical Exam  General: Patient comfortable in bed  Vital Signs Last 12 Hrs  T(F): 97.5 (12-22-21 @ 13:14), Max: 97.8 (12-22-21 @ 05:10)  HR: 104 (12-22-21 @ 13:14) (74 - 104)  BP: 96/55 (12-22-21 @ 13:14) (96/55 - 116/72)  BP(mean): --  RR: 18 (12-22-21 @ 13:14) (17 - 18)  SpO2: 95% (12-22-21 @ 13:14) (95% - 97%)    Diagnostics    Free Thyroxine, Serum: AM Sched. Collection: 16-Dec-2021 06:00 (12-15 @ 14:39)  Free Thyroxine, Serum: AM Sched. Collection: 08-Dec-2021 06:00 (12-07 @ 14:26)

## 2021-12-23 NOTE — PROGRESS NOTE ADULT - SUBJECTIVE AND OBJECTIVE BOX
Chief complaint  Patient is a 71y old  Male who presents with a chief complaint of leg swelling (23 Dec 2021 11:07)   Review of systems  Patient in bed, looks comfortable, no hypoglycemic episodes.    Labs and Fingersticks  CAPILLARY BLOOD GLUCOSE      POCT Blood Glucose.: 116 mg/dL (23 Dec 2021 13:31)  POCT Blood Glucose.: 129 mg/dL (23 Dec 2021 07:33)  POCT Blood Glucose.: 124 mg/dL (22 Dec 2021 21:36)  POCT Blood Glucose.: 107 mg/dL (22 Dec 2021 16:57)    Medications  MEDICATIONS  (STANDING):  atorvastatin 80 milliGRAM(s) Oral at bedtime  carbidopa/levodopa  25/100 2.5 Tablet(s) Oral <User Schedule>  carbidopa/levodopa  25/100 2 Tablet(s) Oral <User Schedule>  chlorhexidine 4% Liquid 1 Application(s) Topical <User Schedule>  cholecalciferol 1000 Unit(s) Oral daily  cinacalcet 60 milliGRAM(s) Oral daily  dextrose 40% Gel 15 Gram(s) Oral once  dextrose 5%. 1000 milliLiter(s) (50 mL/Hr) IV Continuous <Continuous>  dextrose 5%. 1000 milliLiter(s) (100 mL/Hr) IV Continuous <Continuous>  dextrose 50% Injectable 25 Gram(s) IV Push once  dextrose 50% Injectable 12.5 Gram(s) IV Push once  dextrose 50% Injectable 25 Gram(s) IV Push once  diVALproex Sprinkle 250 milliGRAM(s) Oral three times a day  DULoxetine 20 milliGRAM(s) Oral daily  folic acid 1 milliGRAM(s) Oral daily  glucagon  Injectable 1 milliGRAM(s) IntraMuscular once  insulin lispro (ADMELOG) corrective regimen sliding scale   SubCutaneous three times a day before meals  insulin lispro (ADMELOG) corrective regimen sliding scale   SubCutaneous at bedtime  levothyroxine 50 MICROGram(s) Oral daily  memantine 5 milliGRAM(s) Oral at bedtime  metoprolol tartrate 50 milliGRAM(s) Oral two times a day  midodrine 10 milliGRAM(s) Oral <User Schedule>  mirtazapine 7.5 milliGRAM(s) Oral daily  Nephro-celeste 1 Tablet(s) Oral daily  polyethylene glycol 3350 17 Gram(s) Oral two times a day  QUEtiapine 25 milliGRAM(s) Oral three times a day  senna Syrup 10 milliLiter(s) Oral at bedtime  trihexyphenidyl 2 milliGRAM(s) Oral three times a day      Physical Exam  General: Patient comfortable in bed  Vital Signs Last 12 Hrs  T(F): 97.3 (12-23-21 @ 13:35), Max: 97.3 (12-23-21 @ 13:35)  HR: 55 (12-23-21 @ 13:35) (55 - 74)  BP: 139/80 (12-23-21 @ 13:35) (104/59 - 139/80)  BP(mean): --  RR: 18 (12-23-21 @ 13:35) (17 - 18)  SpO2: 94% (12-23-21 @ 13:35) (92% - 94%)    Diagnostics    Free Thyroxine, Serum: AM Sched. Collection: 16-Dec-2021 06:00 (12-15 @ 14:39)  Free Thyroxine, Serum: AM Sched. Collection: 08-Dec-2021 06:00 (12-07 @ 14:26)             Chief complaint  Patient is a 71y old  Male who presents with a chief complaint of leg swelling (23 Dec 2021 11:07)   Review of systems  Patient in bed, looks comfortable, no hypoglycemic episodes.    Labs and Fingersticks  CAPILLARY BLOOD GLUCOSE      POCT Blood Glucose.: 116 mg/dL (23 Dec 2021 13:31)  POCT Blood Glucose.: 129 mg/dL (23 Dec 2021 07:33)  POCT Blood Glucose.: 124 mg/dL (22 Dec 2021 21:36)  POCT Blood Glucose.: 107 mg/dL (22 Dec 2021 16:57)    Medications  MEDICATIONS  (STANDING):  atorvastatin 80 milliGRAM(s) Oral at bedtime  carbidopa/levodopa  25/100 2.5 Tablet(s) Oral <User Schedule>  carbidopa/levodopa  25/100 2 Tablet(s) Oral <User Schedule>  chlorhexidine 4% Liquid 1 Application(s) Topical <User Schedule>  cholecalciferol 1000 Unit(s) Oral daily  cinacalcet 60 milliGRAM(s) Oral daily  dextrose 40% Gel 15 Gram(s) Oral once  dextrose 5%. 1000 milliLiter(s) (50 mL/Hr) IV Continuous <Continuous>  dextrose 5%. 1000 milliLiter(s) (100 mL/Hr) IV Continuous <Continuous>  dextrose 50% Injectable 25 Gram(s) IV Push once  dextrose 50% Injectable 12.5 Gram(s) IV Push once  dextrose 50% Injectable 25 Gram(s) IV Push once  diVALproex Sprinkle 250 milliGRAM(s) Oral three times a day  DULoxetine 20 milliGRAM(s) Oral daily  folic acid 1 milliGRAM(s) Oral daily  glucagon  Injectable 1 milliGRAM(s) IntraMuscular once  insulin lispro (ADMELOG) corrective regimen sliding scale    SubCutaneous three times a day before meals  insulin lispro (ADMELOG) corrective regimen sliding scale   SubCutaneous at bedtime  levothyroxine 50 MICROGram(s) Oral daily  memantine 5 milliGRAM(s) Oral at bedtime  metoprolol tartrate 50 milliGRAM(s) Oral two times a day  midodrine 10 milliGRAM(s) Oral <User Schedule>  mirtazapine 7.5 milliGRAM(s) Oral daily  Nephro-celeste 1 Tablet(s) Oral daily  polyethylene glycol 3350 17 Gram(s) Oral two times a day  QUEtiapine 25 milliGRAM(s) Oral three times a day  senna Syrup 10 milliLiter(s) Oral at bedtime  trihexyphenidyl 2 milliGRAM(s) Oral three times a day      Physical Exam  General: Patient comfortable in bed  Vital Signs Last 12 Hrs  T(F): 97.3 (12-23-21 @ 13:35), Max: 97.3 (12-23-21 @ 13:35)  HR: 55 (12-23-21 @ 13:35) (55 - 74)  BP: 139/80 (12-23-21 @ 13:35) (104/59 - 139/80)  BP(mean): --  RR: 18 (12-23-21 @ 13:35) (17 - 18)  SpO2: 94% (12-23-21 @ 13:35) (92% - 94%)    Diagnostics    Free Thyroxine, Serum: AM Sched. Collection: 16-Dec-2021 06:00 (12-15 @ 14:39)  Free Thyroxine, Serum: AM Sched. Collection: 08-Dec-2021 06:00 (12-07 @ 14:26)

## 2021-12-23 NOTE — PROGRESS NOTE ADULT - ASSESSMENT
Assessment  DMT2: 71y Male with DM T2 with hyperglycemia, A1C 6.4%, was on insulin at home, now on insulin coverage only, blood sugars in acceptable range, no new hypoglycemic  episodes, remains confused, eating partial meals, NAD.  Hypothyroidism: Patient has no history thyroid disease, was not on any thyroid supplements, subclinical with low-normal FT4, lethargic, started on synthroid 25 mcg po daily, repeat FT4   trending down, increased to synthroid  50 mcg po daily, FT4 improving.  CHF: on medications, stable, monitored.  HTN: Controlled,  on antihypertensive medications.  Parkinsons: on meds, monitored.  CKD: Monitor labs/BMP      Kelsie Reece MD  Cell: [phone]  Office: [phone]     Assessment  DMT2: 71y Male with DM T2 with hyperglycemia, A1C 6.4%, was on insulin at home, now on  insulin coverage only, blood sugars in acceptable range, no new hypoglycemic  episodes, remains confused, eating partial meals, NAD.  Hypothyroidism: Patient has no history thyroid disease, was not on any thyroid supplements, subclinical with low-normal FT4, lethargic, started on synthroid 25 mcg po daily, repeat FT4   trending down, increased to synthroid  50 mcg po daily, FT4 improving.  CHF: on medications, stable, monitored.  HTN: Controlled,  on antihypertensive medications.  Parkinsons: on meds, monitored.  CKD: Monitor labs/BMP      Kelsie Reece MD  Cell: [phone]  Office: [phone]

## 2021-12-23 NOTE — PROGRESS NOTE ADULT - SUBJECTIVE AND OBJECTIVE BOX
[facility] [phone]        Patient seen and examined in bed.  He was the same         MEDICATIONS  (STANDING):  atorvastatin 80 milliGRAM(s) Oral at bedtime  carbidopa/levodopa  25/100 2.5 Tablet(s) Oral <User Schedule>  carbidopa/levodopa  25/100 2 Tablet(s) Oral <User Schedule>  chlorhexidine 4% Liquid 1 Application(s) Topical <User Schedule>  cholecalciferol 1000 Unit(s) Oral daily  cinacalcet 60 milliGRAM(s) Oral daily  dextrose 40% Gel 15 Gram(s) Oral once  dextrose 5%. 1000 milliLiter(s) (50 mL/Hr) IV Continuous <Continuous>  dextrose 5%. 1000 milliLiter(s) (100 mL/Hr) IV Continuous <Continuous>  dextrose 50% Injectable 25 Gram(s) IV Push once  dextrose 50% Injectable 12.5 Gram(s) IV Push once  dextrose 50% Injectable 25 Gram(s) IV Push once  diVALproex Sprinkle 250 milliGRAM(s) Oral three times a day  DULoxetine 20 milliGRAM(s) Oral daily  epoetin mari-epbx (RETACRIT) Injectable 4000 Unit(s) IV Push once  folic acid 1 milliGRAM(s) Oral daily  glucagon  Injectable 1 milliGRAM(s) IntraMuscular once  insulin lispro (ADMELOG) corrective regimen sliding scale   SubCutaneous three times a day before meals  insulin lispro (ADMELOG) corrective regimen sliding scale   SubCutaneous at bedtime  levothyroxine 50 MICROGram(s) Oral daily  memantine 5 milliGRAM(s) Oral at bedtime  metoprolol tartrate 50 milliGRAM(s) Oral two times a day  midodrine 10 milliGRAM(s) Oral <User Schedule>  Nephro-celeste 1 Tablet(s) Oral daily  polyethylene glycol 3350 17 Gram(s) Oral two times a day  QUEtiapine 25 milliGRAM(s) Oral three times a day  senna Syrup 10 milliLiter(s) Oral at bedtime  trihexyphenidyl 2 milliGRAM(s) Oral three times a day      VITAL:  T(C): , Max: 36.4 (12-22-21 @ 13:14)  T(F): , Max: 97.5 (12-22-21 @ 13:14)  HR: 98 (12-22-21 @ 21:22)  BP: 111/70 (12-22-21 @ 21:22)  BP(mean): --  RR: 18 (12-22-21 @ 21:22)  SpO2: 98% (12-22-21 @ 21:22)  Wt(kg): --    I and O's:    12-22 @ 07:01  -  12-23 @ 07:00  --------------------------------------------------------  IN: 740 mL / OUT: 0 mL / NET: 740 mL          PHYSICAL EXAM:    Constitutional: NAD  Neck:  No JVD  Respiratory: CTAB/L  Cardiovascular: S1 and S2  Gastrointestinal: BS+, soft, NT/ND  Extremities: No peripheral edema  Neurological: A/O x 3, no focal deficits  Psychiatric: Normal mood, normal affect  : No Javier  Skin: No rashes  Access: perm cath and avf     LABS:                        9.8    9.04  )-----------( 200      ( 22 Dec 2021 07:43 )             32.1     12-22    134<L>  |  95<L>  |  29<H>  ----------------------------<  84  4.5   |  25  |  4.28<H>    Ca    11.3<H>      22 Dec 2021 07:43            Urine Studies:          RADIOLOGY & ADDITIONAL STUDIES:

## 2021-12-23 NOTE — PROGRESS NOTE ADULT - ASSESSMENT
70yo M w/ PMHx of CAD (s/p CABG 2019), CKD (unknown stage), DM2, Parkinson's Disease, HTN, depression presents with new onset acute heart failure exacerbation, NSTEMI, started on hep gtt, developed bl RP bleed, acute anemia. sp MICU course for hemorrhagic shock, 10/28 sp IR embolization l4 and l5 lumbar artery. sp permacath and AVF.    # chronic systolic and diastolic heart failure  # NSTEMI, medical management  # ESRD, new HD  # Atrial flutter  # acute hypoxic resp failure  # hemorrhagic shock, left RP hematoma, acute blood loss anemia sp embolization l4 and l5 lumbar artery 10/28  # lupus anticoagulant +  HD via permacath per renal, midodrine during dialysis  sp L AVF   12/13 pt pulled out permacath, replaced with new permacath 12/14  per renal, AVF needs more time to mature    # Parkinson/s disease   # delirium  cont on depakote seroquel and cymbalta  c/w trihexyphenidyl, carbidopa/levodopa   per neuro, probable early lewy body dementia related paranoia and agitation  psych fu    # DM2 (diabetes mellitus, type 2)  per endo  hba1c 6.4    # CAD (coronary artery disease)  # HTN  lopressor 50mg bid  c/w atorvastatin  asa on hold    # FTT  dw HCP son Branden, concerned about caloric intake  nutrition eval  will start Remeron to stimulate appetite  discussed with son nichole artificial nutrition and he is interested in feeding tube    PCP Dr. Simons    DNR/DNI

## 2021-12-23 NOTE — CHART NOTE - NSCHARTNOTEFT_GEN_A_CORE
Nutrition Follow Up Note  Patient seen for: consult for assessment and education    Chart reviewed, events noted.  pt still with b/l mitten pt pull at line . in enhance supervision room . Pt has AVF not mature and permacath.  Went for Hd today. Pt has poor po intake .plan to start Remeron  discussion on possible ngt.     Source: [] Patient       [x] EMR        [x] RN        [] Family at bedside       [x] Other: NP    -If unable to interview patient: [] Trach/Vent/BiPAP  [] Disoriented/confused/inappropriate to interview    Diet Order:   Diet, Regular:   Supplement Feeding Modality:  Oral  Nepro Cans or Servings Per Day:  1       Frequency:  Daily (21)    - Is current order appropriate/adequate? [x] Yes  []  No:     - PO intake meals :   [] >75%  Adequate    [] 50-75%  Fair       [x] <50%  Poor-pt not interested in eating -RN attempted to encourage pt to eat and pt took several bites of applesauce. When nurse administered soup -pt took several bites but then began to spit it out.   - PO intake of supplements if pt receiving: []>75% []50% [x]25%   as per   []flow sheet  []patient  [x]family/aide-daughter is a nurse at this hospital and is concerned about pt po intake  []PCA  [x]Nurse  [x]RD observation    - Nutrition-related concerns: poor intake    pt seen by SLP [x]Yes []No-  Regular    GI:  Last BM ___.   Bowel Regimen? [x] Yes-Miralax, Senna   [] No      Weights:   Daily Weight in k (-), Weight in k (-), Weight in k.1 (-), Weight in k.3 (12-), Weight in k.5 (12-21), Weight in k.7 (12-20), Weight in k.4 (12-20)    Nutritionally Pertinent MEDICATIONS  (STANDING):  atorvastatin  cholecalciferol  cinacalcet  dextrose 40% Gel  dextrose 5%.  dextrose 5%.  dextrose 50% Injectable  dextrose 50% Injectable  dextrose 50% Injectable  folic acid  glucagon  Injectable  insulin lispro (ADMELOG) corrective regimen sliding scale  insulin lispro (ADMELOG) corrective regimen sliding scale  levothyroxine  metoprolol tartrate  midodrine  Nephro-celeste  polyethylene glycol 3350  senna Syrup    Pertinent Labs:   A1C with Estimated Average Glucose Result: 6.4 % (10-22-21 @ 08:56)    Finger Sticks:  POCT Blood Glucose.: 116 mg/dL ( @ 13:31)  POCT Blood Glucose.: 129 mg/dL ( @ 07:33)  POCT Blood Glucose.: 124 mg/dL ( @ 21:36)  POCT Blood Glucose.: 107 mg/dL ( @ 16:57)      Pressure Injuries as per nursing documentation: none  Edema: none as per nurse    Estimated Needs:   [x] no change since previous assessment  [] recalculated:   Estimated Energy Needs: 0458-1153 kcal (30-35 kcal/kg)  Estimated Protein Needs:  gm protein (1.2-1.4 g/kg)  Defer fluid needs to team  based on IBW 78 kg    Previous Nutrition Diagnosis: Increased Nutrient Needs, Food and Nutrition Related Knowledge Deficit, Severe acute on chronic malnutrition  Nutrition Diagnosis is: [x] ongoing  [] resolved [] not applicable     New Nutrition Diagnosis: [x] Not applicable    Nutrition Care Plan:  [] In Progress  [x] Achieved  [] Not applicable    Nutrition Interventions: pt confused, disoriented and hostile     Education Provided:       [] Yes:  [x] No:   pt was educated on the importance of supplements to increase calorie and protein intake in light of RD's nutritional findings []Yes [x]N/A         Recommendations:         [x] Continue current diet order     [] Change diet to:      [x] continue oral nutrition supplement: Nepro x 1 daily, Vit D3        [] Add micronutrient supplementation:      [x] Continue current micronutrient supplementation: Nephro-Celeste       []Discussed recommendations with provider     [x] Needed to escalate to provider-spoke with pt daughter this morning -she is concerned about pt po intake-referred daughter to NP     []Placed pending verification with NP/PA      []Placed pending verification with Team      []Placed sticker (malnutrition/BMI/underweight)     []Recommend swallow evaluation     [] monitor need for diet ed reinforcement     [x] Other: RD discussed with NP. pt started on Remeron in hopes that it will increase his appetite as per NP. Continue to monitor po intake -consider need for calorie count and possible need for nutrition support.     Monitoring and Evaluation:   Continue to monitor nutritional intake, tolerance to diet prescription, weights, labs, skin integrity      RD remains available upon request and will follow up per protocol  Nisreen Tolbert MA, RD, CDN #[id number] Nutrition Follow Up Note  Patient seen for: consult for assessment and education    Chart reviewed, events noted.  pt still with b/l mitten pt pull at line . in enhance supervision room . Pt has AVF not mature and permacath.  Went for Hd today. Pt has poor po intake .plan to start Remeron  discussion on possible ngt.     Source: [] Patient       [x] EMR        [x] RN        [] Family at bedside       [x] Other: NP    -If unable to interview patient: [] Trach/Vent/BiPAP  [] Disoriented/confused/inappropriate to interview    Diet Order:   Diet, Regular:   Supplement Feeding Modality:  Oral  Nepro Cans or Servings Per Day:  1       Frequency:  Daily (21)    - Is current order appropriate/adequate? [x] Yes  []  No:     - PO intake meals :   [] >75%  Adequate    [] 50-75%  Fair       [x] <50%  Poor-pt not interested in eating -RN attempted to encourage pt to eat and pt took several bites of applesauce. When nurse administered soup -pt took several bites but then began to spit it out.   - PO intake of supplements if pt receiving: []>75% []50% [x]25%   as per   []flow sheet  []patient  [x]family/aide-daughter is a nurse at this hospital and is concerned about pt po intake  []PCA  [x]Nurse  [x]RD observation    - Nutrition-related concerns: poor intake    pt seen by SLP [x]Yes []No-  Regular    GI:  Last BM ___.   Bowel Regimen? [x] Yes-Miralax, Senna   [] No      Weights:   Daily Weight in k (-), Weight in k (-), Weight in k.1 (-), Weight in k.3 (12-), Weight in k.5 (12-21), Weight in k.7 (12-20), Weight in k.4 (12-20)    Nutritionally Pertinent MEDICATIONS  (STANDING):  atorvastatin  cholecalciferol  cinacalcet  dextrose 40% Gel  dextrose 5%.  dextrose 5%.  dextrose 50% Injectable  dextrose 50% Injectable  dextrose 50% Injectable  folic acid  glucagon  Injectable  insulin lispro (ADMELOG) corrective regimen sliding scale  insulin lispro (ADMELOG) corrective regimen sliding scale  levothyroxine  metoprolol tartrate  midodrine  Nephro-celeste  polyethylene glycol 3350  senna Syrup    Pertinent Labs:   A1C with Estimated Average Glucose Result: 6.4 % (10-22-21 @ 08:56)    Finger Sticks:  POCT Blood Glucose.: 116 mg/dL ( @ 13:31)  POCT Blood Glucose.: 129 mg/dL ( @ 07:33)  POCT Blood Glucose.: 124 mg/dL ( @ 21:36)  POCT Blood Glucose.: 107 mg/dL ( @ 16:57)      Pressure Injuries as per nursing documentation: none  Edema: none as per nurse    Estimated Needs:   [x] no change since previous assessment  [] recalculated:   Estimated Energy Needs: 8694-0043 kcal (30-35 kcal/kg)  Estimated Protein Needs:  gm protein (1.2-1.4 g/kg)  Defer fluid needs to team  based on IBW 78 kg    Previous Nutrition Diagnosis: Increased Nutrient Needs, Food and Nutrition Related Knowledge Deficit, Severe acute on chronic malnutrition  Nutrition Diagnosis is: [x] ongoing  [] resolved [] not applicable     New Nutrition Diagnosis: [x] Not applicable    Nutrition Care Plan:  [] In Progress  [x] Achieved  [] Not applicable    Nutrition Interventions: pt confused, disoriented and hostile     Education Provided:       [] Yes:  [x] No:   pt was educated on the importance of supplements to increase calorie and protein intake in light of RD's nutritional findings []Yes [x]N/A         Recommendations:         [x] Continue current diet order     [] Change diet to:      [x] continue oral nutrition supplement: Nepro x 1 daily, Vit D3        [] Add micronutrient supplementation:      [x] Continue current micronutrient supplementation: Nephro-Celeste       []Discussed recommendations with provider     [x] Needed to escalate to provider-spoke with pt daughter this morning -she is concerned about pt po intake-referred daughter to NP     []Placed pending verification with NP/PA      []Placed pending verification with Team      []Placed sticker (malnutrition/BMI/underweight)     []Recommend swallow evaluation     [] monitor need for diet ed reinforcement     [x] Other: RD discussed with NP. pt started on Remeron in hopes that it will increase his appetite as per NP. Continue to monitor po intake -consider need for calorie count and possible need for nutrition support. if intake improves to at least 50-75% of meals and supplement consider need to add Consistent Carbohydrate -pt with history of diabetes. receiving HD -monitor renal indices and need for renal modifications.     Monitoring and Evaluation:   Continue to monitor nutritional intake, tolerance to diet prescription, weights, labs, skin integrity      RD remains available upon request and will follow up per protocol  Nisreen Tolbert MA, DON, CDN #[phone]

## 2021-12-23 NOTE — PROGRESS NOTE ADULT - SUBJECTIVE AND OBJECTIVE BOX
Patient is a 71y old  Male who presents with a chief complaint of leg swelling (23 Dec 2021 09:06)      SUBJECTIVE / OVERNIGHT EVENTS:    Patient seen and examined. poor historian. restless. confused.      Vital Signs Last 24 Hrs  T(C): 36.2 (23 Dec 2021 08:38), Max: 36.4 (22 Dec 2021 13:14)  T(F): 97.2 (23 Dec 2021 08:38), Max: 97.5 (22 Dec 2021 13:14)  HR: 74 (23 Dec 2021 08:38) (74 - 106)  BP: 104/59 (23 Dec 2021 08:38) (96/55 - 111/70)  BP(mean): --  RR: 18 (23 Dec 2021 08:38) (18 - 18)  SpO2: 93% (23 Dec 2021 08:38) (93% - 98%)  I&O's Summary    22 Dec 2021 07:01  -  23 Dec 2021 07:00  --------------------------------------------------------  IN: 740 mL / OUT: 0 mL / NET: 740 mL        PE:  GENERAL: restless, awake, not oriented  HEAD:  Atraumatic, Normocephalic  neck: right permacath CDI  CHEST/LUNG: CTABL, No wheeze  HEART: Regular rate and rhythm; No murmurs, rubs, or gallops  ABDOMEN: Soft, Nontender, Nondistended; Bowel sounds present  EXTREMITIES:  2+ Peripheral Pulses, No edema, left arm AVF    LABS:                        9.8    9.04  )-----------( 200      ( 22 Dec 2021 07:43 )             32.1     12-22    134<L>  |  95<L>  |  29<H>  ----------------------------<  84  4.5   |  25  |  4.28<H>    Ca    11.3<H>      22 Dec 2021 07:43        CAPILLARY BLOOD GLUCOSE      POCT Blood Glucose.: 129 mg/dL (23 Dec 2021 07:33)  POCT Blood Glucose.: 124 mg/dL (22 Dec 2021 21:36)  POCT Blood Glucose.: 107 mg/dL (22 Dec 2021 16:57)  POCT Blood Glucose.: 117 mg/dL (22 Dec 2021 13:18)            RADIOLOGY & ADDITIONAL TESTS:    Imaging Personally Reviewed:  [x] YES  [ ] NO    Consultant(s) Notes Reviewed:  [x] YES  [ ] NO    MEDICATIONS  (STANDING):  atorvastatin 80 milliGRAM(s) Oral at bedtime  carbidopa/levodopa  25/100 2.5 Tablet(s) Oral <User Schedule>  carbidopa/levodopa  25/100 2 Tablet(s) Oral <User Schedule>  chlorhexidine 4% Liquid 1 Application(s) Topical <User Schedule>  cholecalciferol 1000 Unit(s) Oral daily  cinacalcet 60 milliGRAM(s) Oral daily  dextrose 40% Gel 15 Gram(s) Oral once  dextrose 5%. 1000 milliLiter(s) (50 mL/Hr) IV Continuous <Continuous>  dextrose 5%. 1000 milliLiter(s) (100 mL/Hr) IV Continuous <Continuous>  dextrose 50% Injectable 25 Gram(s) IV Push once  dextrose 50% Injectable 12.5 Gram(s) IV Push once  dextrose 50% Injectable 25 Gram(s) IV Push once  diVALproex Sprinkle 250 milliGRAM(s) Oral three times a day  DULoxetine 20 milliGRAM(s) Oral daily  folic acid 1 milliGRAM(s) Oral daily  glucagon  Injectable 1 milliGRAM(s) IntraMuscular once  insulin lispro (ADMELOG) corrective regimen sliding scale   SubCutaneous three times a day before meals  insulin lispro (ADMELOG) corrective regimen sliding scale   SubCutaneous at bedtime  levothyroxine 50 MICROGram(s) Oral daily  memantine 5 milliGRAM(s) Oral at bedtime  metoprolol tartrate 50 milliGRAM(s) Oral two times a day  midodrine 10 milliGRAM(s) Oral <User Schedule>  Nephro-celeste 1 Tablet(s) Oral daily  polyethylene glycol 3350 17 Gram(s) Oral two times a day  QUEtiapine 25 milliGRAM(s) Oral three times a day  senna Syrup 10 milliLiter(s) Oral at bedtime  trihexyphenidyl 2 milliGRAM(s) Oral three times a day    MEDICATIONS  (PRN):  acetaminophen     Tablet .. 650 milliGRAM(s) Oral every 6 hours PRN Mild Pain (1 - 3)  albuterol/ipratropium for Nebulization 3 milliLiter(s) Nebulizer every 6 hours PRN Shortness of Breath and/or Wheezing  diVALproex Sprinkle 125 milliGRAM(s) Oral every 8 hours PRN Agitation  guaiFENesin Oral Liquid (Sugar-Free) 200 milliGRAM(s) Oral every 6 hours PRN Cough  ondansetron Injectable 4 milliGRAM(s) IV Push once PRN Nausea and/or Vomiting  QUEtiapine 12.5 milliGRAM(s) Oral every 6 hours PRN agitation  sodium chloride 0.9% lock flush 10 milliLiter(s) IV Push every 1 hour PRN Pre/post blood products, medications, blood draw, and to maintain line patency      Care Discussed with Consultants/Other Providers [x] YES  [ ] NO    HEALTH ISSUES - PROBLEM Dx:  Acute heart failure    Atrial flutter    DM2 (diabetes mellitus, type 2)    CAD (coronary artery disease)    Parkinsons disease    Acute on chronic renal failure    NSTEMI (non-ST elevation myocardial infarction)    Hypertension    Acute kidney injury superimposed on CKD    Anemia    Hypothyroidism    Delirium    Advanced care planning/counseling discussion    Palliative care status    Palliative care encounter    Pain    Stage 5 chronic kidney disease not on chronic dialysis    Hypercalcemia

## 2021-12-23 NOTE — PROGRESS NOTE ADULT - ASSESSMENT
70yo M w/ PMHx of CAD (s/p CABG 2019), CKD (unknown stage), DM2, Parkinson's Disease, HTN, depression presents with bilateral leg swelling  ESRD   Severe LV dysfunction; A flutter         1 Palliation - Intermittent confusion ;  Can the seroquel be tapered....Seems to be too lethargic   2 Renal-  Plan for HD today  with EPO administration  3 CVS- BP controlled at present, on Midodrine with parameters, Lopressor for heart rate control   4 Anemia -  Retacrit 10k units with HD today;   5 Vasc - s/p  LUE AVF--Immature(this will take weeks to months to mature) ;  Perm cath   6 Endo - Off the lantus at present due to hypoglycemia   7 GI-Is Remeron an option for appetite ?    DC planning to rehab.  Family is aware of is confusion       Sayed Rodriguez   [facility]   [phone]

## 2021-12-24 NOTE — PROGRESS NOTE ADULT - SUBJECTIVE AND OBJECTIVE BOX
Chief complaint  Patient is a 71y old  Male who presents with a chief complaint of leg swelling (24 Dec 2021 07:40)   Review of systems  Patient in bed, looks comfortable, no hypoglycemic episodes.    Labs and Fingersticks  CAPILLARY BLOOD GLUCOSE      POCT Blood Glucose.: 119 mg/dL (24 Dec 2021 08:53)  POCT Blood Glucose.: 113 mg/dL (23 Dec 2021 21:27)  POCT Blood Glucose.: 130 mg/dL (23 Dec 2021 17:03)  POCT Blood Glucose.: 116 mg/dL (23 Dec 2021 13:31)                        Medications  MEDICATIONS  (STANDING):  atorvastatin 80 milliGRAM(s) Oral at bedtime  carbidopa/levodopa  25/100 2.5 Tablet(s) Oral <User Schedule>  carbidopa/levodopa  25/100 2 Tablet(s) Oral <User Schedule>  chlorhexidine 4% Liquid 1 Application(s) Topical <User Schedule>  cholecalciferol 1000 Unit(s) Oral daily  cinacalcet 60 milliGRAM(s) Oral daily  dextrose 40% Gel 15 Gram(s) Oral once  dextrose 5%. 1000 milliLiter(s) (100 mL/Hr) IV Continuous <Continuous>  dextrose 5%. 1000 milliLiter(s) (50 mL/Hr) IV Continuous <Continuous>  dextrose 50% Injectable 25 Gram(s) IV Push once  dextrose 50% Injectable 12.5 Gram(s) IV Push once  dextrose 50% Injectable 25 Gram(s) IV Push once  diVALproex Sprinkle 250 milliGRAM(s) Oral three times a day  DULoxetine 20 milliGRAM(s) Oral daily  folic acid 1 milliGRAM(s) Oral daily  glucagon  Injectable 1 milliGRAM(s) IntraMuscular once  insulin lispro (ADMELOG) corrective regimen sliding scale   SubCutaneous three times a day before meals  insulin lispro (ADMELOG) corrective regimen sliding scale   SubCutaneous at bedtime  levothyroxine 50 MICROGram(s) Oral daily  memantine 5 milliGRAM(s) Oral at bedtime  metoprolol tartrate 50 milliGRAM(s) Oral two times a day  midodrine 10 milliGRAM(s) Oral <User Schedule>  mirtazapine 7.5 milliGRAM(s) Oral daily  Nephro-celeste 1 Tablet(s) Oral daily  polyethylene glycol 3350 17 Gram(s) Oral two times a day  QUEtiapine 25 milliGRAM(s) Oral three times a day  senna Syrup 10 milliLiter(s) Oral at bedtime  trihexyphenidyl 2 milliGRAM(s) Oral three times a day      Physical Exam  General: Patient comfortable in bed  Vital Signs Last 12 Hrs  T(F): 97.4 (12-24-21 @ 09:52), Max: 97.4 (12-24-21 @ 09:52)  HR: 85 (12-24-21 @ 09:52) (74 - 85)  BP: 110/70 (12-24-21 @ 09:52) (110/70 - 147/81)  BP(mean): --  RR: 18 (12-24-21 @ 09:52) (17 - 18)  SpO2: 99% (12-24-21 @ 09:52) (98% - 99%)  Neck: No palpable thyroid nodules.  CVS: S1S2, No murmurs  Respiratory: No wheezing, no crepitations  GI: Abdomen soft, bowel sounds positive  Musculoskeletal:  edema lower extremities.   Skin: No skin rashes, no ecchymosis    Diagnostics    Free Thyroxine, Serum: AM Sched. Collection: 16-Dec-2021 06:00 (12-15 @ 14:39)  Free Thyroxine, Serum: AM Sched. Collection: 08-Dec-2021 06:00 (12-07 @ 14:26)           Chief complaint  Patient is a 71y old  Male who presents with a chief complaint of leg swelling (24 Dec 2021 07:40)   Review of systems  Patient in bed, looks comfortable, no hypoglycemic episodes.    Labs and Fingersticks  CAPILLARY BLOOD GLUCOSE      POCT Blood Glucose.: 119 mg/dL (24 Dec 2021 08:53)  POCT Blood Glucose.: 113 mg/dL (23 Dec 2021 21:27)  POCT Blood Glucose.: 130 mg/dL (23 Dec 2021 17:03)  POCT Blood Glucose.: 116 mg/dL (23 Dec 2021 13:31)                        Medications  MEDICATIONS  (STANDING):  atorvastatin 80 milliGRAM(s) Oral at bedtime  carbidopa/levodopa  25/100 2.5 Tablet(s) Oral <User Schedule>  carbidopa/levodopa  25/100 2 Tablet(s) Oral <User Schedule>  chlorhexidine 4% Liquid 1 Application(s) Topical <User Schedule>  cholecalciferol 1000 Unit(s) Oral daily  cinacalcet 60 milliGRAM(s) Oral daily  dextrose 40% Gel 15 Gram(s) Oral once  dextrose 5%. 1000 milliLiter(s) (100 mL/Hr) IV Continuous <Continuous>  dextrose 5%. 1000 milliLiter(s) (50 mL/Hr) IV Continuous <Continuous>  dextrose 50% Injectable 25 Gram(s) IV Push once  dextrose 50% Injectable 12.5 Gram(s) IV Push once  dextrose 50% Injectable 25 Gram(s) IV Push once  diVALproex Sprinkle 250 milliGRAM(s) Oral three times a day  DULoxetine 20 milliGRAM(s) Oral daily  folic acid 1 milliGRAM(s) Oral daily  glucagon  Injectable 1 milliGRAM(s) IntraMuscular once  insulin lispro (ADMELOG) corrective regimen sliding scale   SubCutaneous three times a day before meals  insulin lispro (ADMELOG) corrective regimen sliding scale   SubCutaneous at bedtime  levothyroxine 50 MICROGram(s) Oral daily  memantine 5 milliGRAM(s) Oral at bedtime  metoprolol tartrate 50 milliGRAM(s) Oral two times a day  midodrine 10 milliGRAM(s) Oral <User Schedule>  mirtazapine 7.5 milliGRAM(s) Oral daily  Nephro-celeste 1 Tablet(s) Oral daily  polyethylene glycol 3350 17 Gram(s) Oral two times a day  QUEtiapine 25 milliGRAM(s) Oral three times a day  senna Syrup 10 milliLiter(s) Oral at bedtime  trihexyphenidyl 2 milliGRAM(s) Oral three times a day      Physical Exam  General: Patient comfortable in bed  Vital Signs Last 12 Hrs  T(F): 97.4 (12-24-21 @ 09:52), Max: 97.4 (12-24-21 @ 09:52)  HR: 85 (12-24-21 @ 09:52) (74 - 85)  BP: 110/70 (12-24-21 @ 09:52) (110/70 - 147/81)  BP(mean): --  RR: 18 (12-24-21 @ 09:52) (17 - 18)  SpO2: 99% (12-24-21 @ 09:52) (98% - 99%)  Neck: No palpable thyroid nodules.  CVS: S1S2, No murmurs  Respiratory: No wheezing, no crepitations  GI: Abdomen soft, bowel sounds positive  Musculoskeletal:  edema lower extremities.   Skin: No skin rashes, no ecchymosis    Diagnostics    Free Thyroxine, Serum: AM Sched. Collection: 16-Dec-2021 06:00 (12-15 @ 14:39)  Free Thyroxine, Serum: AM Sched. Collection: 08-Dec-2021 06:00 (12-07 @ 14:26)

## 2021-12-24 NOTE — PROGRESS NOTE ADULT - ASSESSMENT
Assessment  DMT2: 71y Male with DM T2 with hyperglycemia, A1C 6.4%, was on insulin at home, now on insulin coverage only, blood sugars stable and trending within acceptable range, no new hypoglycemic  episodes, remains confused, eating partial meals, NAD.  Hypothyroidism: Patient has no history thyroid disease, was not on any thyroid supplements, subclinical with low-normal FT4, lethargic, started on synthroid 25 mcg po daily, repeat FT4   trending down, increased to synthroid  50 mcg po daily, FT4 improving.  CHF: on medications, stable, monitored.  HTN: Controlled,  on antihypertensive medications.  Parkinsons: on meds, monitored.  CKD: Monitor labs/BMP      Kelsie Reece MD  Cell: [phone]  Office: [phone]     Assessment  DMT2: 71y Male with DM T2 with hyperglycemia, A1C 6.4%, was on insulin at home, now on insulin coverage only, blood sugars stable and trending within acceptable range, no new hypoglycemic  episodes, remains confused,  eating partial meals, NAD.  Hypothyroidism: Patient has no history thyroid disease, was not on any thyroid supplements, subclinical with low-normal FT4, lethargic, started on synthroid 25 mcg po daily, repeat FT4   trending down, increased to synthroid  50 mcg po daily, FT4 improving.  CHF: on medications, stable, monitored.  HTN: Controlled,  on antihypertensive medications.  Parkinsons: on meds, monitored.  CKD: Monitor labs/BMP      Kelsie Reece MD  Cell: [phone]  Office: [phone]

## 2021-12-24 NOTE — PROGRESS NOTE ADULT - ASSESSMENT
70yo M w/ PMHx of CAD (s/p CABG 2019), CKD (unknown stage), DM2, Parkinson's Disease, HTN, depression presents with new onset acute heart failure exacerbation, NSTEMI, started on hep gtt, developed bl RP bleed, acute anemia. sp MICU course for hemorrhagic shock, 10/28 sp IR embolization l4 and l5 lumbar artery. sp permacath and AVF.    # chronic systolic and diastolic heart failure  # NSTEMI, medical management  # ESRD, new HD  # Atrial flutter  # acute hypoxic resp failure  # hemorrhagic shock, left RP hematoma, acute blood loss anemia sp embolization l4 and l5 lumbar artery 10/28  # lupus anticoagulant +  HD via permacath per renal, midodrine during dialysis  sp L AVF   12/13 pt pulled out permacath, replaced with new permacath 12/14  per renal, AVF needs more time to mature    # Parkinson/s disease   # delirium  cont on depakote seroquel and cymbalta  c/w trihexyphenidyl, carbidopa/levodopa   per neuro, probable early lewy body dementia related paranoia and agitation    # DM2 (diabetes mellitus, type 2)  per endo  hba1c 6.4    # CAD (coronary artery disease)  # HTN  lopressor, atorvastatin  asa on hold    # FTT  as discussed with HCP son Branden, monitor caloric intake on remeron  son does mention he is interested in artificial nutrition if necessary  cont supplement shakes    PCP Dr. Simons    DNR/DNI

## 2021-12-24 NOTE — PROGRESS NOTE ADULT - SUBJECTIVE AND OBJECTIVE BOX
Patient is a 71y old  Male who presents with a chief complaint of leg swelling (23 Dec 2021 13:47)      SUBJECTIVE / OVERNIGHT EVENTS:    Patient seen and examined. no acute events. started on remeron yday for appetite stimulate.      Vital Signs Last 24 Hrs  T(C): 36.3 (24 Dec 2021 04:52), Max: 36.3 (23 Dec 2021 13:35)  T(F): 97.3 (24 Dec 2021 04:52), Max: 97.3 (23 Dec 2021 13:35)  HR: 74 (24 Dec 2021 04:52) (55 - 103)  BP: 147/81 (24 Dec 2021 04:52) (104/59 - 151/58)  BP(mean): --  RR: 17 (24 Dec 2021 04:52) (17 - 18)  SpO2: 98% (24 Dec 2021 04:52) (92% - 98%)  I&O's Summary    23 Dec 2021 07:01  -  24 Dec 2021 07:00  --------------------------------------------------------  IN: 60 mL / OUT: 0 mL / NET: 60 mL        PE:  GENERAL: restless, awake, not oriented  HEAD:  Atraumatic, Normocephalic  neck: right permacath CDI  CHEST/LUNG: CTABL, No wheeze  HEART: Regular rate and rhythm; No murmurs, rubs, or gallops  ABDOMEN: Soft, Nontender, Nondistended; Bowel sounds present  EXTREMITIES:  2+ Peripheral Pulses, No edema, left arm AVF    LABS:                        9.8    9.04  )-----------( 200      ( 22 Dec 2021 07:43 )             32.1     12-22    134<L>  |  95<L>  |  29<H>  ----------------------------<  84  4.5   |  25  |  4.28<H>    Ca    11.3<H>      22 Dec 2021 07:43        CAPILLARY BLOOD GLUCOSE      POCT Blood Glucose.: 113 mg/dL (23 Dec 2021 21:27)  POCT Blood Glucose.: 130 mg/dL (23 Dec 2021 17:03)  POCT Blood Glucose.: 116 mg/dL (23 Dec 2021 13:31)            RADIOLOGY & ADDITIONAL TESTS:    Imaging Personally Reviewed:  [x] YES  [ ] NO    Consultant(s) Notes Reviewed:  [x] YES  [ ] NO    MEDICATIONS  (STANDING):  atorvastatin 80 milliGRAM(s) Oral at bedtime  carbidopa/levodopa  25/100 2.5 Tablet(s) Oral <User Schedule>  carbidopa/levodopa  25/100 2 Tablet(s) Oral <User Schedule>  chlorhexidine 4% Liquid 1 Application(s) Topical <User Schedule>  cholecalciferol 1000 Unit(s) Oral daily  cinacalcet 60 milliGRAM(s) Oral daily  dextrose 40% Gel 15 Gram(s) Oral once  dextrose 5%. 1000 milliLiter(s) (50 mL/Hr) IV Continuous <Continuous>  dextrose 5%. 1000 milliLiter(s) (100 mL/Hr) IV Continuous <Continuous>  dextrose 50% Injectable 25 Gram(s) IV Push once  dextrose 50% Injectable 12.5 Gram(s) IV Push once  dextrose 50% Injectable 25 Gram(s) IV Push once  diVALproex Sprinkle 250 milliGRAM(s) Oral three times a day  DULoxetine 20 milliGRAM(s) Oral daily  folic acid 1 milliGRAM(s) Oral daily  glucagon  Injectable 1 milliGRAM(s) IntraMuscular once  insulin lispro (ADMELOG) corrective regimen sliding scale   SubCutaneous three times a day before meals  insulin lispro (ADMELOG) corrective regimen sliding scale   SubCutaneous at bedtime  levothyroxine 50 MICROGram(s) Oral daily  memantine 5 milliGRAM(s) Oral at bedtime  metoprolol tartrate 50 milliGRAM(s) Oral two times a day  midodrine 10 milliGRAM(s) Oral <User Schedule>  mirtazapine 7.5 milliGRAM(s) Oral daily  Nephro-celeste 1 Tablet(s) Oral daily  polyethylene glycol 3350 17 Gram(s) Oral two times a day  QUEtiapine 25 milliGRAM(s) Oral three times a day  senna Syrup 10 milliLiter(s) Oral at bedtime  trihexyphenidyl 2 milliGRAM(s) Oral three times a day    MEDICATIONS  (PRN):  acetaminophen     Tablet .. 650 milliGRAM(s) Oral every 6 hours PRN Mild Pain (1 - 3)  albuterol/ipratropium for Nebulization 3 milliLiter(s) Nebulizer every 6 hours PRN Shortness of Breath and/or Wheezing  diVALproex Sprinkle 125 milliGRAM(s) Oral every 8 hours PRN Agitation  guaiFENesin Oral Liquid (Sugar-Free) 200 milliGRAM(s) Oral every 6 hours PRN Cough  ondansetron Injectable 4 milliGRAM(s) IV Push once PRN Nausea and/or Vomiting  QUEtiapine 12.5 milliGRAM(s) Oral every 6 hours PRN agitation  sodium chloride 0.9% lock flush 10 milliLiter(s) IV Push every 1 hour PRN Pre/post blood products, medications, blood draw, and to maintain line patency      Care Discussed with Consultants/Other Providers [x] YES  [ ] NO    HEALTH ISSUES - PROBLEM Dx:  Acute heart failure    Atrial flutter    DM2 (diabetes mellitus, type 2)    CAD (coronary artery disease)    Parkinsons disease    Acute on chronic renal failure    NSTEMI (non-ST elevation myocardial infarction)    Hypertension    Acute kidney injury superimposed on CKD    Anemia    Hypothyroidism    Delirium    Advanced care planning/counseling discussion    Palliative care status    Palliative care encounter    Pain    Stage 5 chronic kidney disease not on chronic dialysis    Hypercalcemia

## 2021-12-25 NOTE — CHART NOTE - NSCHARTNOTEFT_GEN_A_CORE
Nutrition Note - Calorie Count     Calorie count initiated 12/24. RD to analyze and interpret results upon completion of 3-days. RN aware. Continues on regular diet + Nepro 1x daily. Noted with hx of poor PO intake. Remeron initiated on 12/23. Diagnosed with severe protein-calorie malnutrition.     Alison Kleiner, RD, [facility] Pager # [phone]

## 2021-12-25 NOTE — PROGRESS NOTE ADULT - SUBJECTIVE AND OBJECTIVE BOX
Chief complaint    Patient is a 71y old  Male who presents with a chief complaint of leg swelling (24 Dec 2021 12:51)   Review of systems  Patient in bed, appears comfortable.    Labs and Fingersticks  CAPILLARY BLOOD GLUCOSE      POCT Blood Glucose.: 112 mg/dL (25 Dec 2021 08:52)  POCT Blood Glucose.: 127 mg/dL (24 Dec 2021 21:12)  POCT Blood Glucose.: 115 mg/dL (24 Dec 2021 17:19)  POCT Blood Glucose.: 118 mg/dL (24 Dec 2021 13:24)      Anion Gap, Serum: 17 (12-25 @ 06:56)      Calcium, Total Serum: 11.5 *H* (12-25 @ 06:56)  Albumin, Serum: 2.3 *L* (12-25 @ 06:56)    Alanine Aminotransferase (ALT/SGPT): <5 *L* (12-25 @ 06:56)  Alkaline Phosphatase, Serum: 86 (12-25 @ 06:56)  Aspartate Aminotransferase (AST/SGOT): 21 (12-25 @ 06:56)        12-25    137  |  99  |  36<H>  ----------------------------<  108<H>  4.2   |  21<L>  |  4.78<H>    Ca    11.5<H>      25 Dec 2021 06:56    TPro  8.1  /  Alb  2.3<L>  /  TBili  0.6  /  DBili  x   /  AST  21  /  ALT  <5<L>  /  AlkPhos  86  12-25                        10.5   9.06  )-----------( 177      ( 25 Dec 2021 06:56 )             34.7     Medications  MEDICATIONS  (STANDING):  atorvastatin 80 milliGRAM(s) Oral at bedtime  carbidopa/levodopa  25/100 2.5 Tablet(s) Oral <User Schedule>  carbidopa/levodopa  25/100 2 Tablet(s) Oral <User Schedule>  chlorhexidine 4% Liquid 1 Application(s) Topical <User Schedule>  cholecalciferol 1000 Unit(s) Oral daily  cinacalcet 60 milliGRAM(s) Oral daily  dextrose 40% Gel 15 Gram(s) Oral once  dextrose 5%. 1000 milliLiter(s) (100 mL/Hr) IV Continuous <Continuous>  dextrose 5%. 1000 milliLiter(s) (50 mL/Hr) IV Continuous <Continuous>  dextrose 50% Injectable 25 Gram(s) IV Push once  dextrose 50% Injectable 12.5 Gram(s) IV Push once  dextrose 50% Injectable 25 Gram(s) IV Push once  diVALproex Sprinkle 250 milliGRAM(s) Oral three times a day  DULoxetine 20 milliGRAM(s) Oral daily  folic acid 1 milliGRAM(s) Oral daily  glucagon  Injectable 1 milliGRAM(s) IntraMuscular once  insulin lispro (ADMELOG) corrective regimen sliding scale   SubCutaneous three times a day before meals  insulin lispro (ADMELOG) corrective regimen sliding scale   SubCutaneous at bedtime  levothyroxine 50 MICROGram(s) Oral daily  memantine 5 milliGRAM(s) Oral at bedtime  metoprolol tartrate 50 milliGRAM(s) Oral two times a day  midodrine 10 milliGRAM(s) Oral <User Schedule>  mirtazapine 7.5 milliGRAM(s) Oral daily  Nephro-celeste 1 Tablet(s) Oral daily  polyethylene glycol 3350 17 Gram(s) Oral two times a day  QUEtiapine 25 milliGRAM(s) Oral three times a day  senna Syrup 10 milliLiter(s) Oral at bedtime  trihexyphenidyl 2 milliGRAM(s) Oral three times a day      Physical Exam  General: Patient comfortable in bed  Vital Signs Last 12 Hrs  T(F): 97.8 (12-25-21 @ 05:21), Max: 97.8 (12-25-21 @ 05:21)  HR: 72 (12-25-21 @ 05:21) (72 - 72)  BP: 114/72 (12-25-21 @ 05:21) (114/72 - 114/72)  BP(mean): --  RR: 18 (12-25-21 @ 05:21) (18 - 18)  SpO2: 95% (12-25-21 @ 05:21) (95% - 95%)  Neck: No palpable thyroid nodules.  CVS: S1S2, No murmurs  Respiratory: No wheezing, no crepitations  GI: Abdomen soft, bowel sounds positive  Musculoskeletal:  edema lower extremities.     Diagnostics

## 2021-12-25 NOTE — DISCHARGE NOTE PROVIDER - CARE PROVIDER_API CALL
IVNA MENDOZA  Cardiovascular Diseases  [address] [place] [address]  Phone: [phone]  Fax: [phone]  Follow Up Time: 1 week    Kelsie Reece)  EndocrinologyMetabDiabetes; Internal Medicine  [address] [place] [address]  Phone: [phone]  Fax: [phone]  Follow Up Time: 2 weeks    Aníbal Frias)  Internal Medicine; Nephrology  [address] [place] [address], [address] [address]  Phone: [phone]  Fax: [phone]  Follow Up Time: 1 week

## 2021-12-25 NOTE — DISCHARGE NOTE PROVIDER - NSDCCPCAREPLAN_GEN_ALL_CORE_FT
PRINCIPAL DISCHARGE DIAGNOSIS  Diagnosis: Acute on chronic systolic congestive heart failure  Assessment and Plan of Treatment: Weigh yourself daily.  If you gain 3lbs in 3 days, or 5lbs in a week call your Health Care Provider.  Do not eat or drink foods containing more than 2000mg of salt (sodium) in your diet every day.  Call your Health Care Provider if you have any swelling or increased swelling in your feet, ankles, and/or stomach.  Take all of your medication as directed.  If you become dizzy call your Health Care Provider.        SECONDARY DISCHARGE DIAGNOSES  Diagnosis: DM2 (diabetes mellitus, type 2)  Assessment and Plan of Treatment: A1c 6.4% on 10/22/21    Diagnosis: CAD (coronary artery disease)  Assessment and Plan of Treatment: Coronary artery disease is a condition where the arteries the supply the heart muscle get clogged with fatty deposits & puts you at risk for a heart attack.  Call your doctor if you have any new pain, pressure, or discomfort in the center of your chest, pain, tingling or discomfort in arms, back, neck, jaw, or stomach, shortness of breath, nausea, vomiting, burping or heartburn, sweating, cold and clammy skin, racing or abnormal heartbeat for more than 10 minutes or if they keep coming & going.  Call 911 and do not try to get to hospital by car.  You can help yourself with lifestyle changes (quitting smoking if you smoke), eat lots of fruits & vegetables & low fat dairy products, not a lot of meat & fatty foods, walk or some form of physical activity most days of the week, lose weight if you are overweight.  Take your cardiac medication as prescribed to lower cholesterol, to lower blood pressure, and control your blood sugar.      Diagnosis: Stage 5 chronic kidney disease not on chronic dialysis  Assessment and Plan of Treatment: Avoid taking (NSAIDs) - (ex: Ibuprofen, Advil, Celebrex, Naprosyn)  Avoid taking any nephrotoxic agents (can harm kidneys) - Intravenous contrast for diagnostic testing, combination cold medications.  Have all medications adjusted for your renal function by your Health Care Provider.  Blood pressure control is important.  Take all medication as prescribed.  Continue with your dialysis session and follow up with your nephrologist 1 week after discharge

## 2021-12-25 NOTE — PROGRESS NOTE ADULT - ASSESSMENT
Assessment  DMT2: 71y Male with DM T2 with hyperglycemia, A1C 6.4%, was on insulin at home, now on insulin coverage only, blood sugars improving, eating partial meals, no overnight events.  Hypothyroidism: Patient has no history thyroid disease, was not on any thyroid supplements, subclinical with low-normal FT4, lethargic, started on synthroid 25 mcg po daily, repeat FT4 trending down, increased to synthroid  50 mcg po daily, FT4 improving.  CHF: on medications, stable, monitored.  HTN: Controlled,  on antihypertensive medications.  Parkinsons: on meds, monitored.  CKD: Monitor labs/BMP      Kelsie Reece MD  Cell: [phone]  Office: [phone]

## 2021-12-25 NOTE — DISCHARGE NOTE PROVIDER - NSDCMRMEDTOKEN_GEN_ALL_CORE_FT
aspirin 325 mg oral tablet: 1 tab(s) orally once a day  atorvastatin 80 mg oral tablet: 1 tab(s) orally once a day (at bedtime)  carbidopa-levodopa 25 mg-100 mg oral tablet: 2 tab(s) orally 3 times a day  folic acid 1 mg oral tablet: 1 tab(s) orally once a day  metoprolol succinate 25 mg oral tablet, extended release: 1 tab(s) orally once a day  NovoLOG 100 units/mL subcutaneous solution: 5 unit(s) subcutaneous 3 times a day (with meals)  Tresiba 100 units/mL subcutaneous solution: 22 unit(s) subcutaneous once a day  trihexyphenidyl 2 mg oral tablet: 1 tab(s) orally 3 times a day  Vitamin D3 25 mcg (1000 intl units) oral capsule: 1 cap(s) orally once a day

## 2021-12-25 NOTE — DISCHARGE NOTE PROVIDER - DETAILS OF MALNUTRITION DIAGNOSIS/DIAGNOSES
This patient has been assessed with a concern for Malnutrition and was treated during this hospitalization for the following Nutrition diagnosis/diagnoses:     -  11/01/2021: Severe protein-calorie malnutrition

## 2021-12-25 NOTE — DISCHARGE NOTE PROVIDER - CARE PROVIDERS DIRECT ADDRESSES
,DirectAddress_Unknown,DirectAddress_Unknown,[email or web address]

## 2021-12-25 NOTE — PROGRESS NOTE ADULT - ASSESSMENT
72yo M w/ PMHx of CAD (s/p CABG 2019), CKD (unknown stage), DM2, Parkinson's Disease, HTN, depression presents with new onset acute heart failure exacerbation, NSTEMI, started on hep gtt, developed bl RP bleed, acute anemia. sp MICU course for hemorrhagic shock, 10/28 sp IR embolization l4 and l5 lumbar artery. sp permacath and AVF.    # chronic systolic and diastolic heart failure  # NSTEMI, medical management  # ESRD, new HD  # Atrial flutter  # acute hypoxic resp failure  # hemorrhagic shock, left RP hematoma, acute blood loss anemia sp embolization l4 and l5 lumbar artery 10/28  # lupus anticoagulant +  HD via permacath per renal, midodrine during dialysis  sp L AVF   12/13 pt pulled out permacath, replaced with new permacath 12/14  per renal, AVF needs more time to mature    # Parkinson/s disease   # delirium  cont on depakote seroquel and cymbalta  c/w trihexyphenidyl, carbidopa/levodopa   per neuro, probable early lewy body dementia related paranoia and agitation    # DM2 (diabetes mellitus, type 2)  per endo  hba1c 6.4    # CAD (coronary artery disease)  # HTN  lopressor, atorvastatin  asa on hold    # FTT  no improvement on remeron, cont to monitor, calorie count  son HCP is interested in artificial nutrition if necessary, GI consult  cont supplement polina    PCP Dr. Simons    DNR/DNI

## 2021-12-25 NOTE — PROGRESS NOTE ADULT - SUBJECTIVE AND OBJECTIVE BOX
Patient is a 71y old  Male who presents with a chief complaint of leg swelling (25 Dec 2021 10:41)      SUBJECTIVE / OVERNIGHT EVENTS:    Patient seen and examined. not eating per staff, even with assistance.      Vital Signs Last 24 Hrs  T(C): 36.6 (25 Dec 2021 05:21), Max: 36.6 (25 Dec 2021 05:21)  T(F): 97.8 (25 Dec 2021 05:21), Max: 97.8 (25 Dec 2021 05:21)  HR: 72 (25 Dec 2021 05:21) (72 - 96)  BP: 114/72 (25 Dec 2021 05:21) (114/72 - 127/84)  BP(mean): --  RR: 18 (25 Dec 2021 05:21) (18 - 18)  SpO2: 95% (25 Dec 2021 05:21) (93% - 95%)  I&O's Summary    24 Dec 2021 07:01  -  25 Dec 2021 07:00  --------------------------------------------------------  IN: 220 mL / OUT: 0 mL / NET: 220 mL        PE:  GENERAL: restless, awake, not oriented  HEAD:  Atraumatic, Normocephalic  neck: right permacath CDI  CHEST/LUNG: CTABL, No wheeze  HEART: Regular rate and rhythm; No murmurs, rubs, or gallops  ABDOMEN: Soft, Nontender, Nondistended; Bowel sounds present  EXTREMITIES:  2+ Peripheral Pulses, No edema, left arm AVF    LABS:                        10.5   9.06  )-----------( 177      ( 25 Dec 2021 06:56 )             34.7     12-25    137  |  99  |  36<H>  ----------------------------<  108<H>  4.2   |  21<L>  |  4.78<H>    Ca    11.5<H>      25 Dec 2021 06:56    TPro  8.1  /  Alb  2.3<L>  /  TBili  0.6  /  DBili  x   /  AST  21  /  ALT  <5<L>  /  AlkPhos  86  12-25      CAPILLARY BLOOD GLUCOSE      POCT Blood Glucose.: 112 mg/dL (25 Dec 2021 08:52)  POCT Blood Glucose.: 127 mg/dL (24 Dec 2021 21:12)  POCT Blood Glucose.: 115 mg/dL (24 Dec 2021 17:19)  POCT Blood Glucose.: 118 mg/dL (24 Dec 2021 13:24)            RADIOLOGY & ADDITIONAL TESTS:    Imaging Personally Reviewed:  [x] YES  [ ] NO    Consultant(s) Notes Reviewed:  [x] YES  [ ] NO    MEDICATIONS  (STANDING):  atorvastatin 80 milliGRAM(s) Oral at bedtime  carbidopa/levodopa  25/100 2.5 Tablet(s) Oral <User Schedule>  carbidopa/levodopa  25/100 2 Tablet(s) Oral <User Schedule>  chlorhexidine 4% Liquid 1 Application(s) Topical <User Schedule>  cholecalciferol 1000 Unit(s) Oral daily  cinacalcet 60 milliGRAM(s) Oral daily  dextrose 40% Gel 15 Gram(s) Oral once  dextrose 5%. 1000 milliLiter(s) (50 mL/Hr) IV Continuous <Continuous>  dextrose 5%. 1000 milliLiter(s) (100 mL/Hr) IV Continuous <Continuous>  dextrose 50% Injectable 25 Gram(s) IV Push once  dextrose 50% Injectable 12.5 Gram(s) IV Push once  dextrose 50% Injectable 25 Gram(s) IV Push once  diVALproex Sprinkle 250 milliGRAM(s) Oral three times a day  DULoxetine 20 milliGRAM(s) Oral daily  folic acid 1 milliGRAM(s) Oral daily  glucagon  Injectable 1 milliGRAM(s) IntraMuscular once  insulin lispro (ADMELOG) corrective regimen sliding scale   SubCutaneous three times a day before meals  insulin lispro (ADMELOG) corrective regimen sliding scale   SubCutaneous at bedtime  levothyroxine 50 MICROGram(s) Oral daily  memantine 5 milliGRAM(s) Oral at bedtime  metoprolol tartrate 50 milliGRAM(s) Oral two times a day  midodrine 10 milliGRAM(s) Oral <User Schedule>  mirtazapine 7.5 milliGRAM(s) Oral daily  Nephro-celeste 1 Tablet(s) Oral daily  polyethylene glycol 3350 17 Gram(s) Oral two times a day  QUEtiapine 25 milliGRAM(s) Oral three times a day  senna Syrup 10 milliLiter(s) Oral at bedtime  trihexyphenidyl 2 milliGRAM(s) Oral three times a day    MEDICATIONS  (PRN):  acetaminophen     Tablet .. 650 milliGRAM(s) Oral every 6 hours PRN Mild Pain (1 - 3)  albuterol/ipratropium for Nebulization 3 milliLiter(s) Nebulizer every 6 hours PRN Shortness of Breath and/or Wheezing  diVALproex Sprinkle 125 milliGRAM(s) Oral every 8 hours PRN Agitation  guaiFENesin Oral Liquid (Sugar-Free) 200 milliGRAM(s) Oral every 6 hours PRN Cough  ondansetron Injectable 4 milliGRAM(s) IV Push once PRN Nausea and/or Vomiting  QUEtiapine 12.5 milliGRAM(s) Oral every 6 hours PRN agitation  sodium chloride 0.9% lock flush 10 milliLiter(s) IV Push every 1 hour PRN Pre/post blood products, medications, blood draw, and to maintain line patency      Care Discussed with Consultants/Other Providers [x] YES  [ ] NO    HEALTH ISSUES - PROBLEM Dx:  Acute heart failure    Atrial flutter    DM2 (diabetes mellitus, type 2)    CAD (coronary artery disease)    Parkinsons disease    Acute on chronic renal failure    NSTEMI (non-ST elevation myocardial infarction)    Hypertension    Acute kidney injury superimposed on CKD    Anemia    Hypothyroidism    Delirium    Advanced care planning/counseling discussion    Palliative care status    Palliative care encounter    Pain    Stage 5 chronic kidney disease not on chronic dialysis    Hypercalcemia

## 2021-12-25 NOTE — DISCHARGE NOTE PROVIDER - PROVIDER TOKENS
PROVIDER:[TOKEN:[[id number]],FOLLOWUP:[1 week]],PROVIDER:[TOKEN:[[id number]],FOLLOWUP:[2 weeks]],PROVIDER:[TOKEN:[[id number]],FOLLOWUP:[1 week]]

## 2021-12-26 NOTE — PROGRESS NOTE ADULT - ASSESSMENT
Assessment  DMT2: 71y Male with DM T2 with hyperglycemia, A1C 6.4%, was on insulin at home, now on insulin coverage only, blood sugars fairly controled, eating partial meals, no overnight events. Fs Dec 25 : [phone], this   Hypothyroidism: Patient has no history thyroid disease, was not on any thyroid supplements, subclinical with low-normal FT4, lethargic, started on synthroid 25 mcg po daily, repeat FT4 trending down, increased to synthroid  50 mcg po daily, FT4 improving.  CHF: on medications, stable, monitored.  HTN: Controlled,  on antihypertensive medications.  Parkinsons: on meds, monitored.  CKD: Monitor labs/BMP      Dr Ayers  Cell : [phone]

## 2021-12-26 NOTE — PROGRESS NOTE ADULT - ASSESSMENT
72yo M w/ PMHx of CAD (s/p CABG 2019), CKD (unknown stage), DM2, Parkinson's Disease, HTN, depression presents with new onset acute heart failure exacerbation, NSTEMI, started on hep gtt, developed bl RP bleed, acute anemia. sp MICU course for hemorrhagic shock, 10/28 sp IR embolization l4 and l5 lumbar artery. sp permacath and AVF.    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 related paranoia and agitation    # DM2 (diabetes mellitus, type 2)  per endo  hba1c 6.4    # CAD (coronary artery disease)  # HTN  lopressor, atorvastatin  asa on hold    # FTT  no improvement on remeron, cont to monitor, calorie count  son HCP is interested in artificial nutrition if necessary, GI consult  cont supplement shakes    DVT ppx  venodynes  (Given increase risk factors, fall risk, extensive RP bleed, not a candidate for full AC for a.flutter.   However, may consider low dose Hep SQ BID for DVT ppx in the future if hgb remain stable)    PCP Dr. Simons    DNR/DNI

## 2021-12-26 NOTE — PROGRESS NOTE ADULT - ASSESSMENT
s/p HD 12/23  Afebrile  on room air  Denies complaints  Due for HD today      acetaminophen     Tablet .. 650 milliGRAM(s) Oral every 6 hours PRN  albuterol/ipratropium for Nebulization 3 milliLiter(s) Nebulizer every 6 hours PRN  atorvastatin 80 milliGRAM(s) Oral at bedtime  carbidopa/levodopa  25/100 2.5 Tablet(s) Oral <User Schedule>  carbidopa/levodopa  25/100 2 Tablet(s) Oral <User Schedule>  chlorhexidine 4% Liquid 1 Application(s) Topical <User Schedule>  cholecalciferol 1000 Unit(s) Oral daily  cinacalcet 60 milliGRAM(s) Oral daily  dextrose 40% Gel 15 Gram(s) Oral once  dextrose 5%. 1000 milliLiter(s) IV Continuous <Continuous>  dextrose 5%. 1000 milliLiter(s) IV Continuous <Continuous>  dextrose 50% Injectable 25 Gram(s) IV Push once  dextrose 50% Injectable 12.5 Gram(s) IV Push once  dextrose 50% Injectable 25 Gram(s) IV Push once  diVALproex Sprinkle 250 milliGRAM(s) Oral three times a day  diVALproex Sprinkle 125 milliGRAM(s) Oral every 8 hours PRN  DULoxetine 20 milliGRAM(s) Oral daily  folic acid 1 milliGRAM(s) Oral daily  glucagon  Injectable 1 milliGRAM(s) IntraMuscular once  guaiFENesin Oral Liquid (Sugar-Free) 200 milliGRAM(s) Oral every 6 hours PRN  insulin lispro (ADMELOG) corrective regimen sliding scale   SubCutaneous three times a day before meals  insulin lispro (ADMELOG) corrective regimen sliding scale   SubCutaneous at bedtime  levothyroxine 50 MICROGram(s) Oral daily  memantine 5 milliGRAM(s) Oral at bedtime  metoprolol tartrate 50 milliGRAM(s) Oral two times a day  midodrine 10 milliGRAM(s) Oral <User Schedule>  mirtazapine 7.5 milliGRAM(s) Oral daily  Nephro-celeste 1 Tablet(s) Oral daily  ondansetron Injectable 4 milliGRAM(s) IV Push once PRN  polyethylene glycol 3350 17 Gram(s) Oral two times a day  QUEtiapine 25 milliGRAM(s) Oral three times a day  QUEtiapine 12.5 milliGRAM(s) Oral every 6 hours PRN  senna Syrup 10 milliLiter(s) Oral at bedtime  sodium chloride 0.9% lock flush 10 milliLiter(s) IV Push every 1 hour PRN  trihexyphenidyl 2 milliGRAM(s) Oral three times a day      VITAL:  T(C): , Max: 36.4 (12-26-21 @ 04:02)  T(F): , Max: 97.5 (12-26-21 @ 04:02)  HR: 95 (12-26-21 @ 09:06)  BP: 126/91 (12-26-21 @ 09:06)  BP(mean): --  RR: 17 (12-26-21 @ 09:06)  SpO2: 94% (12-26-21 @ 09:06)  Wt(kg): --    12-25-21 @ 07:01  -  12-26-21 @ 07:00  --------------------------------------------------------  IN: 0 mL / OUT: 400 mL / NET: -400 mL        PHYSICAL EXAM:  Constitutional: NAD  Neck:  No JVD  Respiratory: CTAB/L  Cardiovascular: S1 and S2  Gastrointestinal: BS+, soft, NT/ND  Extremities: No peripheral edema  Neurological: A/O x 3, no focal deficits  Psychiatric: Normal mood, normal affect  : No Javier  Skin: No rashes  Access: perm cath and avf       LABS:                          9.9    10.44 )-----------( 198      ( 26 Dec 2021 07:23 )             33.4     Na(140)/K(3.9)/Cl(99)/HCO3(22)/BUN(44)/Cr(5.88)Glu(106)/Ca(11.5)/Mg(2.4)/PO4(6.1)    12-26 @ 07:20  Na(137)/K(4.2)/Cl(99)/HCO3(21)/BUN(36)/Cr(4.78)Glu(108)/Ca(11.5)/Mg(--)/PO4(--)    12-25 @ 06:56            ASSESSMENT/PLAN  70yo M w/ PMHx of CAD (s/p CABG 2019), CKD (unknown stage), DM2, Parkinson's Disease, HTN, depression presents with bilateral leg swelling  ESRD   Severe LV dysfunction; A flutter         1 Palliation - Intermittent confusion ;  Can the seroquel be tapered....Seems to be too lethargic   2 Renal-  Due for  HD today  with EPO administration  3 CVS- BP controlled at present, on Midodrine with parameters, Lopressor for heart rate control   4 Anemia -  Retacrit 10k units with HD today;   5 Vasc - s/p  LUE AVF--Immature(this will take weeks to months to mature) ;  Perm cath   6 Endo - Off the lantus at present due to hypoglycemia   7 GI-on  Remeron now    DC planning to rehab.  Family is aware of is confusion        Get Delcid NP-BC  [facility]  [phone]

## 2021-12-26 NOTE — PROGRESS NOTE ADULT - SUBJECTIVE AND OBJECTIVE BOX
Chief Complaint:  Patient is a 71y old  Male who presents with a chief complaint of leg swelling (26 Dec 2021 13:54)      Date of service 21 @ 17:59      Interval Events: reconsulted for consideration for enteral nutrition    Hospital Medications:  acetaminophen     Tablet .. 650 milliGRAM(s) Oral every 6 hours PRN  albuterol/ipratropium for Nebulization 3 milliLiter(s) Nebulizer every 6 hours PRN  atorvastatin 80 milliGRAM(s) Oral at bedtime  carbidopa/levodopa  25/100 2.5 Tablet(s) Oral <User Schedule>  carbidopa/levodopa  25/100 2 Tablet(s) Oral <User Schedule>  chlorhexidine 4% Liquid 1 Application(s) Topical <User Schedule>  cholecalciferol 1000 Unit(s) Oral daily  cinacalcet 60 milliGRAM(s) Oral daily  dextrose 40% Gel 15 Gram(s) Oral once  dextrose 5%. 1000 milliLiter(s) IV Continuous <Continuous>  dextrose 5%. 1000 milliLiter(s) IV Continuous <Continuous>  dextrose 50% Injectable 25 Gram(s) IV Push once  dextrose 50% Injectable 12.5 Gram(s) IV Push once  dextrose 50% Injectable 25 Gram(s) IV Push once  diVALproex Sprinkle 125 milliGRAM(s) Oral every 8 hours PRN  diVALproex Sprinkle 250 milliGRAM(s) Oral three times a day  DULoxetine 20 milliGRAM(s) Oral daily  folic acid 1 milliGRAM(s) Oral daily  glucagon  Injectable 1 milliGRAM(s) IntraMuscular once  guaiFENesin Oral Liquid (Sugar-Free) 200 milliGRAM(s) Oral every 6 hours PRN  insulin lispro (ADMELOG) corrective regimen sliding scale   SubCutaneous three times a day before meals  insulin lispro (ADMELOG) corrective regimen sliding scale   SubCutaneous at bedtime  levothyroxine 50 MICROGram(s) Oral daily  memantine 5 milliGRAM(s) Oral at bedtime  metoprolol tartrate 50 milliGRAM(s) Oral two times a day  midodrine 10 milliGRAM(s) Oral <User Schedule>  mirtazapine 7.5 milliGRAM(s) Oral daily  Nephro-celeste 1 Tablet(s) Oral daily  ondansetron Injectable 4 milliGRAM(s) IV Push once PRN  polyethylene glycol 3350 17 Gram(s) Oral two times a day  QUEtiapine 25 milliGRAM(s) Oral three times a day  QUEtiapine 12.5 milliGRAM(s) Oral every 6 hours PRN  senna Syrup 10 milliLiter(s) Oral at bedtime  sodium chloride 0.9% lock flush 10 milliLiter(s) IV Push every 1 hour PRN  trihexyphenidyl 2 milliGRAM(s) Oral three times a day        edema    PHYSICAL EXAM:   Vital Signs:  Vital Signs Last 24 Hrs  T(C): 36.4 (26 Dec 2021 17:51), Max: 36.6 (26 Dec 2021 13:39)  T(F): 97.6 (26 Dec 2021 17:51), Max: 97.8 (26 Dec 2021 13:39)  HR: 90 (26 Dec 2021 17:51) (90 - 105)  BP: 140/75 (26 Dec 2021 17:51) (126/91 - 150/90)  BP(mean): --  RR: 18 (26 Dec 2021 17:51) (17 - 18)  SpO2: 100% (26 Dec 2021 17:51) (94% - 100%)  Daily     Daily Weight in k.6 (26 Dec 2021 12:22)      PHYSICAL EXAM:     GENERAL:  Appears stated age, well-groomed, well-nourished, no distress  HEENT:  NC/AT,  conjunctivae anicteric, clear and pink,   NECK: supple, trachea midline  CHEST:  Full & symmetric excursion, no increased effort, breath sounds clear  HEART:  Regular rhythm, no JVD  ABDOMEN:  Soft, non-tender, non-distended, normoactive bowel sounds,  no masses , no hepatosplenomegaly  EXTREMITIES:  no cyanosis,clubbing or edema  SKIN:  No rash, erythema, or, ecchymoses, no jaundice  NEURO:   confused  PSYCH: calm, confused  RECTAL: Deferred      LABS Personally reviewed by me:                        9.9    10.44 )-----------( 198      ( 26 Dec 2021 07:23 )             33.4     Mean Cell Volume: 106.4 fl (- @ 07:23)        140  |  99  |  44<H>  ----------------------------<  106<H>  3.9   |  22  |  5.88<H>    Ca    11.5<H>      26 Dec 2021 07:20  Phos  6.1       Mg     2.4         TPro  8.1  /  Alb  2.3<L>  /  TBili  0.6  /  DBili  x   /  AST  21  /  ALT  <5<L>  /  AlkPhos  86      LIVER FUNCTIONS - ( 25 Dec 2021 06:56 )  Alb: 2.3 g/dL / Pro: 8.1 g/dL / ALK PHOS: 86 U/L / ALT: <5 U/L / AST: 21 U/L / GGT: x                                       9.9    10.44 )-----------( 198      ( 26 Dec 2021 07:23 )             33.4                         10.5   9.06  )-----------( 177      ( 25 Dec 2021 06:56 )             34.7       Imaging personally reviewed by me:

## 2021-12-26 NOTE — PROGRESS NOTE ADULT - ASSESSMENT
Reconsulted for consideration for PEG placement. Patient started on remeron in hopes of increased PO intake.  -I will reach out to patient's family about this complex case, as there are numerous concerns, patient with prolonged hospital course, multiorgan dysfunction (renal failure, CHF, dementia) prognosis seems very limited. He is at high operative risk and benefit of PEG is likely limited. If pt family understands this risk and still desires PEG, would consider proceeding.  -would re-engage with cardiology for preop, palliative care, and I will touch base w heme given prolonged PTT, can potentially plan for PEG mid-week if desired

## 2021-12-26 NOTE — PROGRESS NOTE ADULT - SUBJECTIVE AND OBJECTIVE BOX
Endocrinology Attending Covering for Dr. Reece      Chief complaint  Patient is a 71y old  Male who presents with a chief complaint of leg swelling (25 Dec 2021 15:03)   Review of systems  Patient in bed, looks comfortable, no fever,  had no hypoglycemia.    Labs and Fingersticks  CAPILLARY BLOOD GLUCOSE      POCT Blood Glucose.: 127 mg/dL (26 Dec 2021 08:00)  POCT Blood Glucose.: 120 mg/dL (25 Dec 2021 21:33)  POCT Blood Glucose.: 130 mg/dL (25 Dec 2021 17:27)  POCT Blood Glucose.: 117 mg/dL (25 Dec 2021 12:42)      Anion Gap, Serum: 19 *H* (12-26 @ 07:20)  Anion Gap, Serum: 17 (12-25 @ 06:56)      Calcium, Total Serum: 11.5 *H* (12-26 @ 07:20)  Calcium, Total Serum: 11.5 *H* (12-25 @ 06:56)  Albumin, Serum: 2.3 *L* (12-25 @ 06:56)    Alanine Aminotransferase (ALT/SGPT): <5 *L* (12-25 @ 06:56)  Alkaline Phosphatase, Serum: 86 (12-25 @ 06:56)  Aspartate Aminotransferase (AST/SGOT): 21 (12-25 @ 06:56)        12-26    140  |  99  |  44<H>  ----------------------------<  106<H>  3.9   |  22  |  5.88<H>    Ca    11.5<H>      26 Dec 2021 07:20  Phos  6.1     12-26  Mg     2.4     12-26    TPro  8.1  /  Alb  2.3<L>  /  TBili  0.6  /  DBili  x   /  AST  21  /  ALT  <5<L>  /  AlkPhos  86  12-25                        9.9    10.44 )-----------( 198      ( 26 Dec 2021 07:23 )             33.4     Medications  MEDICATIONS  (STANDING):  atorvastatin 80 milliGRAM(s) Oral at bedtime  carbidopa/levodopa  25/100 2.5 Tablet(s) Oral <User Schedule>  carbidopa/levodopa  25/100 2 Tablet(s) Oral <User Schedule>  chlorhexidine 4% Liquid 1 Application(s) Topical <User Schedule>  cholecalciferol 1000 Unit(s) Oral daily  cinacalcet 60 milliGRAM(s) Oral daily  dextrose 40% Gel 15 Gram(s) Oral once  dextrose 5%. 1000 milliLiter(s) (50 mL/Hr) IV Continuous <Continuous>  dextrose 5%. 1000 milliLiter(s) (100 mL/Hr) IV Continuous <Continuous>  dextrose 50% Injectable 25 Gram(s) IV Push once  dextrose 50% Injectable 12.5 Gram(s) IV Push once  dextrose 50% Injectable 25 Gram(s) IV Push once  diVALproex Sprinkle 250 milliGRAM(s) Oral three times a day  DULoxetine 20 milliGRAM(s) Oral daily  folic acid 1 milliGRAM(s) Oral daily  glucagon  Injectable 1 milliGRAM(s) IntraMuscular once  insulin lispro (ADMELOG) corrective regimen sliding scale   SubCutaneous three times a day before meals  insulin lispro (ADMELOG) corrective regimen sliding scale   SubCutaneous at bedtime  levothyroxine 50 MICROGram(s) Oral daily  memantine 5 milliGRAM(s) Oral at bedtime  metoprolol tartrate 50 milliGRAM(s) Oral two times a day  midodrine 10 milliGRAM(s) Oral <User Schedule>  mirtazapine 7.5 milliGRAM(s) Oral daily  Nephro-celeste 1 Tablet(s) Oral daily  polyethylene glycol 3350 17 Gram(s) Oral two times a day  QUEtiapine 25 milliGRAM(s) Oral three times a day  senna Syrup 10 milliLiter(s) Oral at bedtime  trihexyphenidyl 2 milliGRAM(s) Oral three times a day      Physical Exam  General: Patient comfortable in bed  Vital Signs Last 12 Hrs  T(F): 97.3 (12-26-21 @ 09:06), Max: 97.5 (12-26-21 @ 04:02)  HR: 95 (12-26-21 @ 09:06) (95 - 97)  BP: 126/91 (12-26-21 @ 09:06) (126/91 - 150/90)  BP(mean): --  RR: 17 (12-26-21 @ 09:06) (17 - 18)  SpO2: 94% (12-26-21 @ 09:06) (94% - 94%)  Neck: No palpable thyroid nodules.  CVS: S1S2, No murmurs  Respiratory: No wheezing, no crepitations  GI: Abdomen soft, bowel sounds positive  Musculoskeletal:  edema lower extremities.   Skin: No skin rashes, no ecchymosis    Diagnostics

## 2021-12-26 NOTE — PROGRESS NOTE ADULT - SUBJECTIVE AND OBJECTIVE BOX
AAOX4; Pt shared losing her father <2 months ago and continues to grieve over the loss. We discussed her plan of care while here adding a Case Management Consult to address grief. Plan of care, scheduled and prn meds ordered, oriented to floor and unit were a part of her initial orientation. Spouse at bedside shared his concerns for her care. Pain management as well as monitoring her I&O's part of her initial case plan. Bed in low position with side rails X2; given snack prior to bed, SR on telemonitor. Will continue to monitor.   SUBJECTIVE / OVERNIGHT EVENTS:  comfortable  awake but eye close. Open on command  denied pain  forgetful.. didn't remember he ate, but RN at bedside confirmed.  HD planning today  no cp, no sob, no n/v/d. no abdominal pain.  no headache, no dizziness.       --------------------------------------------------------------------------------------------  LABS:                        9.9    10.44 )-----------( 198      ( 26 Dec 2021 07:23 )             33.4     12-26    140  |  99  |  44<H>  ----------------------------<  106<H>  3.9   |  22  |  5.88<H>    Ca    11.5<H>      26 Dec 2021 07:20  Phos  6.1     12-26  Mg     2.4     12-26    TPro  8.1  /  Alb  2.3<L>  /  TBili  0.6  /  DBili  x   /  AST  21  /  ALT  <5<L>  /  AlkPhos  86  12-25      CAPILLARY BLOOD GLUCOSE      POCT Blood Glucose.: 112 mg/dL (26 Dec 2021 13:13)  POCT Blood Glucose.: 127 mg/dL (26 Dec 2021 08:00)  POCT Blood Glucose.: 120 mg/dL (25 Dec 2021 21:33)  POCT Blood Glucose.: 130 mg/dL (25 Dec 2021 17:27)            RADIOLOGY & ADDITIONAL TESTS:    Imaging Personally Reviewed:  [x] YES  [ ] NO    Consultant(s) Notes Reviewed:  [x] YES  [ ] NO    MEDICATIONS  (STANDING):  atorvastatin 80 milliGRAM(s) Oral at bedtime  carbidopa/levodopa  25/100 2.5 Tablet(s) Oral <User Schedule>  carbidopa/levodopa  25/100 2 Tablet(s) Oral <User Schedule>  chlorhexidine 4% Liquid 1 Application(s) Topical <User Schedule>  cholecalciferol 1000 Unit(s) Oral daily  cinacalcet 60 milliGRAM(s) Oral daily  dextrose 40% Gel 15 Gram(s) Oral once  dextrose 5%. 1000 milliLiter(s) (50 mL/Hr) IV Continuous <Continuous>  dextrose 5%. 1000 milliLiter(s) (100 mL/Hr) IV Continuous <Continuous>  dextrose 50% Injectable 25 Gram(s) IV Push once  dextrose 50% Injectable 12.5 Gram(s) IV Push once  dextrose 50% Injectable 25 Gram(s) IV Push once  diVALproex Sprinkle 250 milliGRAM(s) Oral three times a day  DULoxetine 20 milliGRAM(s) Oral daily  folic acid 1 milliGRAM(s) Oral daily  glucagon  Injectable 1 milliGRAM(s) IntraMuscular once  insulin lispro (ADMELOG) corrective regimen sliding scale   SubCutaneous three times a day before meals  insulin lispro (ADMELOG) corrective regimen sliding scale   SubCutaneous at bedtime  levothyroxine 50 MICROGram(s) Oral daily  memantine 5 milliGRAM(s) Oral at bedtime  metoprolol tartrate 50 milliGRAM(s) Oral two times a day  midodrine 10 milliGRAM(s) Oral <User Schedule>  mirtazapine 7.5 milliGRAM(s) Oral daily  Nephro-celeste 1 Tablet(s) Oral daily  polyethylene glycol 3350 17 Gram(s) Oral two times a day  QUEtiapine 25 milliGRAM(s) Oral three times a day  senna Syrup 10 milliLiter(s) Oral at bedtime  trihexyphenidyl 2 milliGRAM(s) Oral three times a day    MEDICATIONS  (PRN):  acetaminophen     Tablet .. 650 milliGRAM(s) Oral every 6 hours PRN Mild Pain (1 - 3)  albuterol/ipratropium for Nebulization 3 milliLiter(s) Nebulizer every 6 hours PRN Shortness of Breath and/or Wheezing  diVALproex Sprinkle 125 milliGRAM(s) Oral every 8 hours PRN Agitation  guaiFENesin Oral Liquid (Sugar-Free) 200 milliGRAM(s) Oral every 6 hours PRN Cough  ondansetron Injectable 4 milliGRAM(s) IV Push once PRN Nausea and/or Vomiting  QUEtiapine 12.5 milliGRAM(s) Oral every 6 hours PRN agitation  sodium chloride 0.9% lock flush 10 milliLiter(s) IV Push every 1 hour PRN Pre/post blood products, medications, blood draw, and to maintain line patency      Care Discussed with Consultants/Other Providers [x] YES  [ ] NO    Vital Signs Last 24 Hrs  T(C): 36.6 (26 Dec 2021 13:39), Max: 36.6 (26 Dec 2021 13:39)  T(F): 97.8 (26 Dec 2021 13:39), Max: 97.8 (26 Dec 2021 13:39)  HR: 94 (26 Dec 2021 13:39) (93 - 105)  BP: 144/79 (26 Dec 2021 13:39) (126/91 - 150/90)  BP(mean): --  RR: 18 (26 Dec 2021 13:39) (17 - 18)  SpO2: 100% (26 Dec 2021 13:39) (94% - 100%)  I&O's Summary    PHYSICAL EXAM:  GENERAL: NAD, thin-elderly, comfortable  HEAD:  Atraumatic, Normocephalic  EYES: EOMI, PERRLA, conjunctiva and sclera clear  NECK: Supple, No JVD  CHEST/LUNG: mild decrease breath sounds bilaterally; No wheeze, Permcath in place   HEART: Regular rate and rhythm; No murmurs, rubs, or gallops  ABDOMEN: Soft, Nontender, Nondistended; Bowel sounds present  Neuro: AAOx0-1, no focal weakness. mittens in place.   EXTREMITIES:  2+ Peripheral Pulses, No clubbing, cyanosis, or edema  SKIN: No rashes or lesions   25 Dec 2021 07:01  -  26 Dec 2021 07:00  --------------------------------------------------------  IN: 0 mL / OUT: 400 mL / NET: -400 mL    26 Dec 2021 07:01  -  26 Dec 2021 13:54  --------------------------------------------------------  IN: 240 mL / OUT: 0 mL / NET: 240 mL

## 2021-12-27 NOTE — PROGRESS NOTE ADULT - ASSESSMENT
Assessment  DMT2: 71y Male with DM T2 with hyperglycemia, A1C 6.4%, was on insulin at home, now on insulin coverage only, blood sugars are trending within acceptable range, no hypoglycemias, eating partial meals, appears comfortable, confused and forgetful.  Hypothyroidism: Patient has no history thyroid disease, was not on any thyroid supplements, subclinical with low-normal FT4, lethargic, started on synthroid 25 mcg po daily, repeat FT4 trending down, increased to synthroid  50 mcg po daily, FT4 improving.  CHF: on medications, stable, monitored.  HTN: Controlled,  on antihypertensive medications.  Parkinsons: on meds, monitored.  CKD: Monitor labs/BMP      Dr Ayers  Cell : [phone]

## 2021-12-27 NOTE — PROGRESS NOTE ADULT - SUBJECTIVE AND OBJECTIVE BOX
SUBJECTIVE / OVERNIGHT EVENTS:  comfortable  confused at times  PCA feeding him        --------------------------------------------------------------------------------------------  LABS:                        9.9    10.44 )-----------( 198      ( 26 Dec 2021 07:23 )             33.4     12-27    136  |  98  |  27<H>  ----------------------------<  95  4.0   |  24  |  4.00<H>    Ca    11.2<H>      27 Dec 2021 07:26  Phos  5.3     12-27  Mg     2.2     12-27      PT/INR - ( 27 Dec 2021 07:26 )   PT: 12.4 sec;   INR: 1.04 ratio         PTT - ( 27 Dec 2021 07:26 )  PTT:38.1 sec  CAPILLARY BLOOD GLUCOSE      POCT Blood Glucose.: 162 mg/dL (27 Dec 2021 22:18)  POCT Blood Glucose.: 103 mg/dL (27 Dec 2021 16:54)  POCT Blood Glucose.: 153 mg/dL (27 Dec 2021 11:53)  POCT Blood Glucose.: 112 mg/dL (27 Dec 2021 08:46)            RADIOLOGY & ADDITIONAL TESTS:    Imaging Personally Reviewed:  [x] YES  [ ] NO    Consultant(s) Notes Reviewed:  [x] YES  [ ] NO    MEDICATIONS  (STANDING):  atorvastatin 80 milliGRAM(s) Oral at bedtime  carbidopa/levodopa  25/100 2.5 Tablet(s) Oral <User Schedule>  carbidopa/levodopa  25/100 2 Tablet(s) Oral <User Schedule>  chlorhexidine 4% Liquid 1 Application(s) Topical <User Schedule>  cholecalciferol 1000 Unit(s) Oral daily  cinacalcet 60 milliGRAM(s) Oral daily  dextrose 40% Gel 15 Gram(s) Oral once  dextrose 5%. 1000 milliLiter(s) (50 mL/Hr) IV Continuous <Continuous>  dextrose 5%. 1000 milliLiter(s) (100 mL/Hr) IV Continuous <Continuous>  dextrose 50% Injectable 25 Gram(s) IV Push once  dextrose 50% Injectable 12.5 Gram(s) IV Push once  dextrose 50% Injectable 25 Gram(s) IV Push once  diVALproex Sprinkle 250 milliGRAM(s) Oral three times a day  DULoxetine 20 milliGRAM(s) Oral daily  folic acid 1 milliGRAM(s) Oral daily  glucagon  Injectable 1 milliGRAM(s) IntraMuscular once  insulin lispro (ADMELOG) corrective regimen sliding scale   SubCutaneous three times a day before meals  insulin lispro (ADMELOG) corrective regimen sliding scale   SubCutaneous at bedtime  levothyroxine 50 MICROGram(s) Oral daily  memantine 5 milliGRAM(s) Oral at bedtime  metoprolol tartrate 50 milliGRAM(s) Oral two times a day  midodrine 10 milliGRAM(s) Oral <User Schedule>  mirtazapine 7.5 milliGRAM(s) Oral daily  Nephro-celeste 1 Tablet(s) Oral daily  polyethylene glycol 3350 17 Gram(s) Oral two times a day  QUEtiapine 25 milliGRAM(s) Oral three times a day  senna Syrup 10 milliLiter(s) Oral at bedtime  trihexyphenidyl 2 milliGRAM(s) Oral three times a day    MEDICATIONS  (PRN):  acetaminophen     Tablet .. 650 milliGRAM(s) Oral every 6 hours PRN Mild Pain (1 - 3)  albuterol/ipratropium for Nebulization 3 milliLiter(s) Nebulizer every 6 hours PRN Shortness of Breath and/or Wheezing  diVALproex Sprinkle 125 milliGRAM(s) Oral every 8 hours PRN Agitation  guaiFENesin Oral Liquid (Sugar-Free) 200 milliGRAM(s) Oral every 6 hours PRN Cough  ondansetron Injectable 4 milliGRAM(s) IV Push once PRN Nausea and/or Vomiting  QUEtiapine 12.5 milliGRAM(s) Oral every 6 hours PRN agitation  sodium chloride 0.9% lock flush 10 milliLiter(s) IV Push every 1 hour PRN Pre/post blood products, medications, blood draw, and to maintain line patency      Care Discussed with Consultants/Other Providers [x] YES  [ ] NO    Vital Signs Last 24 Hrs  T(C): 36.8 (27 Dec 2021 21:11), Max: 36.8 (27 Dec 2021 21:11)  T(F): 98.2 (27 Dec 2021 21:11), Max: 98.2 (27 Dec 2021 21:11)  HR: 91 (27 Dec 2021 21:11) (82 - 104)  BP: 105/72 (27 Dec 2021 21:11) (105/72 - 144/82)  BP(mean): --  RR: 18 (27 Dec 2021 21:11) (17 - 18)  SpO2: 91% (27 Dec 2021 21:11) (91% - 98%)  I&O's Summary    26 Dec 2021 07:01  -  27 Dec 2021 07:00  --------------------------------------------------------  IN: 540 mL / OUT: 0 mL / NET: 540 mL    27 Dec 2021 07:01  -  27 Dec 2021 23:24  --------------------------------------------------------  IN: 920 mL / OUT: 0 mL / NET: 920 mL        PHYSICAL EXAM:  GENERAL: NAD, thin-elderly, comfortable  HEAD:  Atraumatic, Normocephalic  EYES: EOMI, PERRLA, conjunctiva and sclera clear  NECK: Supple, No JVD  CHEST/LUNG: mild decrease breath sounds bilaterally; No wheeze, Permcath in place   HEART: Regular rate and rhythm; No murmurs, rubs, or gallops  ABDOMEN: Soft, Nontender, Nondistended; Bowel sounds present  Neuro: AAOx0-1, no focal weakness. mittens in place.   EXTREMITIES:  2+ Peripheral Pulses, No clubbing, cyanosis, or edema  SKIN: No rashes or lesions

## 2021-12-27 NOTE — PROGRESS NOTE ADULT - SUBJECTIVE AND OBJECTIVE BOX
Chief complaint  Patient is a 71y old  Male who presents with a chief complaint of leg swelling (26 Dec 2021 17:57)   Review of systems  Patient in bed, looks comfortable, no hypoglycemic episodes.    Labs and Fingersticks  CAPILLARY BLOOD GLUCOSE      POCT Blood Glucose.: 153 mg/dL (27 Dec 2021 11:53)  POCT Blood Glucose.: 112 mg/dL (27 Dec 2021 08:46)  POCT Blood Glucose.: 100 mg/dL (26 Dec 2021 21:18)  POCT Blood Glucose.: 109 mg/dL (26 Dec 2021 17:40)      Anion Gap, Serum: 14 (12-27 @ 07:26)  Anion Gap, Serum: 19 *H* (12-26 @ 07:20)      Calcium, Total Serum: 11.2 *H* (12-27 @ 07:26)  Calcium, Total Serum: 11.5 *H* (12-26 @ 07:20)          12-27    136  |  98  |  27<H>  ----------------------------<  95  4.0   |  24  |  4.00<H>    Ca    11.2<H>      27 Dec 2021 07:26  Phos  5.3     12-27  Mg     2.2     12-27                          9.9    10.44 )-----------( 198      ( 26 Dec 2021 07:23 )             33.4     Medications  MEDICATIONS  (STANDING):  atorvastatin 80 milliGRAM(s) Oral at bedtime  carbidopa/levodopa  25/100 2.5 Tablet(s) Oral <User Schedule>  carbidopa/levodopa  25/100 2 Tablet(s) Oral <User Schedule>  chlorhexidine 4% Liquid 1 Application(s) Topical <User Schedule>  cholecalciferol 1000 Unit(s) Oral daily  cinacalcet 60 milliGRAM(s) Oral daily  dextrose 40% Gel 15 Gram(s) Oral once  dextrose 5%. 1000 milliLiter(s) (50 mL/Hr) IV Continuous <Continuous>  dextrose 5%. 1000 milliLiter(s) (100 mL/Hr) IV Continuous <Continuous>  dextrose 50% Injectable 25 Gram(s) IV Push once  dextrose 50% Injectable 12.5 Gram(s) IV Push once  dextrose 50% Injectable 25 Gram(s) IV Push once  diVALproex Sprinkle 250 milliGRAM(s) Oral three times a day  DULoxetine 20 milliGRAM(s) Oral daily  folic acid 1 milliGRAM(s) Oral daily  glucagon  Injectable 1 milliGRAM(s) IntraMuscular once  insulin lispro (ADMELOG) corrective regimen sliding scale   SubCutaneous three times a day before meals  insulin lispro (ADMELOG) corrective regimen sliding scale   SubCutaneous at bedtime  levothyroxine 50 MICROGram(s) Oral daily  memantine 5 milliGRAM(s) Oral at bedtime  metoprolol tartrate 50 milliGRAM(s) Oral two times a day  midodrine 10 milliGRAM(s) Oral <User Schedule>  mirtazapine 7.5 milliGRAM(s) Oral daily  Nephro-celeste 1 Tablet(s) Oral daily  polyethylene glycol 3350 17 Gram(s) Oral two times a day  QUEtiapine 25 milliGRAM(s) Oral three times a day  senna Syrup 10 milliLiter(s) Oral at bedtime  trihexyphenidyl 2 milliGRAM(s) Oral three times a day      Physical Exam  General: Patient comfortable in bed  Vital Signs Last 12 Hrs  T(F): 97.7 (12-27-21 @ 11:18), Max: 97.7 (12-27-21 @ 11:18)  HR: 86 (12-27-21 @ 11:18) (86 - 104)  BP: 125/71 (12-27-21 @ 11:18) (125/71 - 144/82)  BP(mean): --  RR: 18 (12-27-21 @ 11:18) (17 - 18)  SpO2: 98% (12-27-21 @ 11:18) (93% - 98%)    Diagnostics    Free Thyroxine, Serum: AM Sched. Collection: 16-Dec-2021 06:00 (12-15 @ 14:39)  Free Thyroxine, Serum: AM Sched. Collection: 08-Dec-2021 06:00 (12-07 @ 14:26)

## 2021-12-27 NOTE — PROGRESS NOTE ADULT - SUBJECTIVE AND OBJECTIVE BOX
[facility]      Seen earlier today, and examined.  - Today, patient is without complaints.           *****MEDICATIONS: Current medication reviewed and documented.    MEDICATIONS  (STANDING):  atorvastatin 80 milliGRAM(s) Oral at bedtime  carbidopa/levodopa  25/100 2.5 Tablet(s) Oral <User Schedule>  carbidopa/levodopa  25/100 2 Tablet(s) Oral <User Schedule>  chlorhexidine 4% Liquid 1 Application(s) Topical <User Schedule>  cholecalciferol 1000 Unit(s) Oral daily  cinacalcet 60 milliGRAM(s) Oral daily  dextrose 40% Gel 15 Gram(s) Oral once  dextrose 5%. 1000 milliLiter(s) (50 mL/Hr) IV Continuous <Continuous>  dextrose 5%. 1000 milliLiter(s) (100 mL/Hr) IV Continuous <Continuous>  dextrose 50% Injectable 25 Gram(s) IV Push once  dextrose 50% Injectable 12.5 Gram(s) IV Push once  dextrose 50% Injectable 25 Gram(s) IV Push once  diVALproex Sprinkle 250 milliGRAM(s) Oral three times a day  DULoxetine 20 milliGRAM(s) Oral daily  folic acid 1 milliGRAM(s) Oral daily  glucagon  Injectable 1 milliGRAM(s) IntraMuscular once  insulin lispro (ADMELOG) corrective regimen sliding scale   SubCutaneous three times a day before meals  insulin lispro (ADMELOG) corrective regimen sliding scale   SubCutaneous at bedtime  levothyroxine 50 MICROGram(s) Oral daily  memantine 5 milliGRAM(s) Oral at bedtime  metoprolol tartrate 50 milliGRAM(s) Oral two times a day  midodrine 10 milliGRAM(s) Oral <User Schedule>  mirtazapine 7.5 milliGRAM(s) Oral daily  Nephro-celeste 1 Tablet(s) Oral daily  polyethylene glycol 3350 17 Gram(s) Oral two times a day  QUEtiapine 25 milliGRAM(s) Oral three times a day  senna Syrup 10 milliLiter(s) Oral at bedtime  trihexyphenidyl 2 milliGRAM(s) Oral three times a day    MEDICATIONS  (PRN):  acetaminophen     Tablet .. 650 milliGRAM(s) Oral every 6 hours PRN Mild Pain (1 - 3)  albuterol/ipratropium for Nebulization 3 milliLiter(s) Nebulizer every 6 hours PRN Shortness of Breath and/or Wheezing  diVALproex Sprinkle 125 milliGRAM(s) Oral every 8 hours PRN Agitation  guaiFENesin Oral Liquid (Sugar-Free) 200 milliGRAM(s) Oral every 6 hours PRN Cough  ondansetron Injectable 4 milliGRAM(s) IV Push once PRN Nausea and/or Vomiting  QUEtiapine 12.5 milliGRAM(s) Oral every 6 hours PRN agitation  sodium chloride 0.9% lock flush 10 milliLiter(s) IV Push every 1 hour PRN Pre/post blood products, medications, blood draw, and to maintain line patency          ***** VITAL SIGNS:  T(F): 98.2 (21 @ 21:11), Max: 98.2 (21 @ 21:11)  HR: 91 (21 @ 21:11) (82 - 104)  BP: 105/72 (21 @ 21:11) (105/72 - 144/82)  RR: 18 (21 @ 21:11) (17 - 18)  SpO2: 91% (21 @ 21:11) (91% - 98%)  Wt(kg): --  ,   I&O's Summary    26 Dec 2021 07:  -  27 Dec 2021 07:00  --------------------------------------------------------  IN: 540 mL / OUT: 0 mL / NET: 540 mL    27 Dec 2021 07:  -  27 Dec 2021 21:39  --------------------------------------------------------  IN: 920 mL / OUT: 0 mL / NET: 920 mL             *****PHYSICAL EXAM:   eyes close   appears to be acting out his dreams   does not cooperate with exam          *****LAB AND IMAGIN.9    10.44 )-----------( 198      ( 26 Dec 2021 07:23 )             33.4               12-    136  |  98  |  27<H>  ----------------------------<  95  4.0   |  24  |  4.00<H>    Ca    11.2<H>      27 Dec 2021 07:26  Phos  5.3     12  Mg     2.2     12      PT/INR - ( 27 Dec 2021 07:26 )   PT: 12.4 sec;   INR: 1.04 ratio         PTT - ( 27 Dec 2021 07:26 )  PTT:38.1 sec                     [All pertinent recent Imaging/Reports reviewed]           *****A S S E S S M E N T   A N D   P L A N :72yo M w/ PMHx of CAD (s/p CABG ), CKD (unknown stage), DM2, Parkinson's Disease, HTN, depression presents with bilateral leg swelling, patient's daughter in-law at bedside Elsy Quintero ([address] [place] [address] Nurse) provides the history, the daughter reports the patient is a poor historian, unable to remember having conversations with others and likely cannot weigh risk/benefits of medical conditions, she reports that he has had bilateral lower extremity swelling for the past few months, but over the past 2 weeks it has significantly worsened, he has further difficulty ambulating due to swelling, his outpatient provider attempted to manage with 20mg lasix and then increased to 40mg lasix but the patient never took the increased dose, his symptoms ar persistent throughout the day and are extremely severe, he has developed wounds of the legs with weeping of fluid due to the swelling, she reports that the patient is frequently non-compliant with medications and medical management, he lives at home with his son and daughter in law, he denies chest pain, shortness of breath, fever/chills, cough, sputum, in the ED, he was tachycardic but hemodynamically stable, afebrile, saturating well on room air, labs were significant for elevated BNP, elevated creatinine, imaging showed moderate left pleural effusion, patient was admitted to general medicine for further management          Problem/Recommendations 1:ams of unclear etiology   likely related to multiple metabolic derangements related to uremia  sleep wake cycle disruption and poor nutritional intake  would regulate sleep wake cycle  check b12/b1 folate/tsh /ammonia wnl   thiamine 500 iv q 8 x 2 days after checking vitamin b1 level   nutrition consult for severe protein caloric malnutrition   discussed with elsy( daughter in law), agreeing to start namenda 5 mg qhs probable early lewy body dementia related paranoia and agitation   plan likely needs to adjust dose of antipsychotics, however avoid typical antipsychotics  elsy denies any alcohol intake at all.     lowered seroquel 12.5 due to sedation    over night pt did not sleep   now fell asleep as per aide at 7.15   regulate sleep wake cycle   dc am dose of seroquel   increase night time dose to regulate sleep wake cycle        more awake   conversant.   still somewhat agitated  does follow simple commands   pt able to arouse, converse    more awake, following commands   still with paranoia      on cymbalta    still with waxing and waning mental status     Problem/Recommendations 2: weakness   r/o orthostatic hypotension   pt shaking on standing up    prob deconditioning   pt eval   oob to chair as tolerated           Thank you for allowing me to participate in the care of this patient. Will continue to follow patient periodically. Please do not hesitate to call me if you have any  questions or if there has been a change in patients neurological status     ________________  Lily Fang MD  [facility] ([facility]  [phone]      33 minutes spent on total encounter; more than 50 % of the visit was  spent counseling about plan of care, compliance to diet/exercise and medication regimen and or  coordinating care by the attending physician.      It is advised that stroke patients follow up with ZACH Albarran @ [phone] in 1- 2 weeks.   Others please follow up with Dr. Michael Nissenbaum [phone]

## 2021-12-28 NOTE — PROGRESS NOTE ADULT - SUBJECTIVE AND OBJECTIVE BOX
[facility] [phone]        Patient seen and examined today on HD.         MEDICATIONS  (STANDING):  atorvastatin 80 milliGRAM(s) Oral at bedtime  carbidopa/levodopa  25/100 2.5 Tablet(s) Oral <User Schedule>  carbidopa/levodopa  25/100 2 Tablet(s) Oral <User Schedule>  chlorhexidine 4% Liquid 1 Application(s) Topical <User Schedule>  cholecalciferol 1000 Unit(s) Oral daily  cinacalcet 60 milliGRAM(s) Oral daily  dextrose 40% Gel 15 Gram(s) Oral once  dextrose 5%. 1000 milliLiter(s) (50 mL/Hr) IV Continuous <Continuous>  dextrose 5%. 1000 milliLiter(s) (100 mL/Hr) IV Continuous <Continuous>  dextrose 50% Injectable 25 Gram(s) IV Push once  dextrose 50% Injectable 12.5 Gram(s) IV Push once  dextrose 50% Injectable 25 Gram(s) IV Push once  diVALproex Sprinkle 250 milliGRAM(s) Oral three times a day  DULoxetine 20 milliGRAM(s) Oral daily  folic acid 1 milliGRAM(s) Oral daily  glucagon  Injectable 1 milliGRAM(s) IntraMuscular once  insulin lispro (ADMELOG) corrective regimen sliding scale   SubCutaneous three times a day before meals  insulin lispro (ADMELOG) corrective regimen sliding scale   SubCutaneous at bedtime  levothyroxine 50 MICROGram(s) Oral daily  memantine 5 milliGRAM(s) Oral at bedtime  metoprolol tartrate 50 milliGRAM(s) Oral two times a day  midodrine 10 milliGRAM(s) Oral <User Schedule>  mirtazapine 7.5 milliGRAM(s) Oral daily  Nephro-celeste 1 Tablet(s) Oral daily  polyethylene glycol 3350 17 Gram(s) Oral two times a day  QUEtiapine 25 milliGRAM(s) Oral three times a day  senna Syrup 10 milliLiter(s) Oral at bedtime  trihexyphenidyl 2 milliGRAM(s) Oral three times a day      VITAL:  T(C): , Max: 36.8 (12-27-21 @ 21:11)  T(F): , Max: 98.2 (12-27-21 @ 21:11)  HR: 98 (12-28-21 @ 08:25)  BP: 112/45 (12-28-21 @ 08:25)  BP(mean): --  RR: 17 (12-28-21 @ 08:25)  SpO2: 99% (12-28-21 @ 08:25)  Wt(kg): --    I and O's:    12-27 @ 07:01  -  12-28 @ 07:00  --------------------------------------------------------  IN: 1160 mL / OUT: 0 mL / NET: 1160 mL          PHYSICAL EXAM:    Constitutional: NAD,   Neck:  No JVD  Respiratory: CTAB/L  Cardiovascular: S1 and S2  Gastrointestinal: BS+, soft, NT/ND  Extremities: No peripheral edema  Neurological: A/O x 2   : No Javier  Skin: No rashes  Access: kylie (yimi)JUAN     LABS:    12-27    136  |  98  |  27<H>  ----------------------------<  95  4.0   |  24  |  4.00<H>    Ca    11.2<H>      27 Dec 2021 07:26  Phos  5.3     12-27  Mg     2.2     12-27            Urine Studies:          RADIOLOGY & ADDITIONAL STUDIES:

## 2021-12-28 NOTE — PROGRESS NOTE ADULT - ASSESSMENT
ASSESSMENT/PLAN  70yo M w/ PMHx of CAD (s/p CABG 2019), CKD (unknown stage), DM2, Parkinson's Disease, HTN, depression presents with bilateral leg swelling  ESRD   Severe LV dysfunction; A flutter         1 Palliation - Intermittent confusion ;  Can the seroquel be tapered....Seems to be too lethargic   2 Renal-  HD now   3 CVS- BP controlled at present, on Midodrine with parameters, Lopressor for heart rate control   4 Anemia - hgb improving as of late, check CBC  5 Vasc - s/p  LUE AVF--Immature (this will take weeks to months to mature) ;  Perm cath   6 Endo - Off the lantus at present due to hypoglycemia   7 GI-on  Remeron now     DC planning    Phylicia Ornelas NP  [employer]  [phone]

## 2021-12-28 NOTE — PROGRESS NOTE ADULT - SUBJECTIVE AND OBJECTIVE BOX
Chief complaint  Patient is a 71y old  Male who presents with a chief complaint of leg swelling (28 Dec 2021 09:39)   Review of systems  Patient in bed, looks comfortable, no hypoglycemic episodes.    Labs and Fingersticks  CAPILLARY BLOOD GLUCOSE      POCT Blood Glucose.: 100 mg/dL (28 Dec 2021 12:51)  POCT Blood Glucose.: 121 mg/dL (28 Dec 2021 07:36)  POCT Blood Glucose.: 162 mg/dL (27 Dec 2021 22:18)  POCT Blood Glucose.: 103 mg/dL (27 Dec 2021 16:54)      Anion Gap, Serum: 14 (12-27 @ 07:26)      Calcium, Total Serum: 11.2 *H* (12-27 @ 07:26)          12-27    136  |  98  |  27<H>  ----------------------------<  95  4.0   |  24  |  4.00<H>    Ca    11.2<H>      27 Dec 2021 07:26  Phos  5.3     12-27  Mg     2.2     12-27      Medications  MEDICATIONS  (STANDING):  atorvastatin 80 milliGRAM(s) Oral at bedtime  carbidopa/levodopa  25/100 2.5 Tablet(s) Oral <User Schedule>  carbidopa/levodopa  25/100 2 Tablet(s) Oral <User Schedule>  chlorhexidine 4% Liquid 1 Application(s) Topical <User Schedule>  cholecalciferol 1000 Unit(s) Oral daily  cinacalcet 60 milliGRAM(s) Oral daily  dextrose 40% Gel 15 Gram(s) Oral once  dextrose 5%. 1000 milliLiter(s) (50 mL/Hr) IV Continuous <Continuous>  dextrose 5%. 1000 milliLiter(s) (100 mL/Hr) IV Continuous <Continuous>  dextrose 50% Injectable 25 Gram(s) IV Push once  dextrose 50% Injectable 12.5 Gram(s) IV Push once  dextrose 50% Injectable 25 Gram(s) IV Push once  diVALproex Sprinkle 250 milliGRAM(s) Oral three times a day  DULoxetine 20 milliGRAM(s) Oral daily  folic acid 1 milliGRAM(s) Oral daily  glucagon  Injectable 1 milliGRAM(s) IntraMuscular once  insulin lispro (ADMELOG) corrective regimen sliding scale   SubCutaneous three times a day before meals  insulin lispro (ADMELOG) corrective regimen sliding scale   SubCutaneous at bedtime  levothyroxine 50 MICROGram(s) Oral daily  memantine 5 milliGRAM(s) Oral at bedtime  metoprolol tartrate 50 milliGRAM(s) Oral two times a day  midodrine 10 milliGRAM(s) Oral <User Schedule>  mirtazapine 7.5 milliGRAM(s) Oral daily  Nephro-celeste 1 Tablet(s) Oral daily  polyethylene glycol 3350 17 Gram(s) Oral two times a day  QUEtiapine 25 milliGRAM(s) Oral three times a day  senna Syrup 10 milliLiter(s) Oral at bedtime  trihexyphenidyl 2 milliGRAM(s) Oral three times a day      Physical Exam  General: Patient comfortable in bed  Vital Signs Last 12 Hrs  T(F): 98.2 (12-28-21 @ 12:20), Max: 98.2 (12-28-21 @ 12:20)  HR: 117 (12-28-21 @ 12:20) (98 - 117)  BP: 118/78 (12-28-21 @ 12:20) (112/45 - 133/77)  BP(mean): --  RR: 18 (12-28-21 @ 12:20) (17 - 18)  SpO2: 97% (12-28-21 @ 12:20) (96% - 99%)  Neck: No palpable thyroid nodules.    Diagnostics    Free Thyroxine, Serum: AM Sched. Collection: 16-Dec-2021 06:00 (12-15 @ 14:39)  Free Thyroxine, Serum: AM Sched. Collection: 08-Dec-2021 06:00 (12-07 @ 14:26)

## 2021-12-28 NOTE — PROGRESS NOTE ADULT - PROBLEM SELECTOR PROBLEM 5
Hypertension [Dear  ___] : Dear  [unfilled], [Consult Letter:] : I had the pleasure of evaluating your patient, [unfilled]. [Consult Closing:] : Thank you very much for allowing me to participate in the care of this patient.  If you have any questions, please do not hesitate to contact me. [Sincerely,] : Sincerely, [FreeTextEntry2] : Dr. Tan Colin [FreeTextEntry1] : A case of Paget's disease, borderline hypertension, hyperlipidemia, hypothyroidism, hypercalcemia, H/o nephrolithiasis, recently noted to have elevated serum creatinine. Patient has come for kidney function follow-up. \par Patient has gained 20 lbs and BP is high. \par Lab tests evaluated.  Serum creatinine 1.13 mg/dl with eGFR 67 ml.. Urine analysis within acceptable range. Serological tests are negative. Patient does not have any overt renal abnormality.  [FreeTextEntry3] : NANCY Leon

## 2021-12-28 NOTE — PROGRESS NOTE ADULT - ASSESSMENT
70yo M w/ PMHx of CAD (s/p CABG 2019), CKD (unknown stage), DM2, Parkinson's Disease, HTN, depression presents with new onset acute heart failure exacerbation, NSTEMI, started on hep gtt, developed bl RP bleed, acute anemia. sp MICU course for hemorrhagic shock, 10/28 sp IR embolization l4 and l5 lumbar artery. sp permacath and AVF.    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 related paranoia and agitation    # DM2 (diabetes mellitus, type 2)  per endo  hba1c 6.4    # CAD (coronary artery disease)  # HTN  lopressor, atorvastatin  asa on hold    # FTT  no improvement on remeron, cont to monitor, calorie count  son HCP is interested in artificial nutrition if necessary, GI consult  cont supplement shakes    DVT ppx  venodynes  (Given increase risk factors, fall risk, extensive RP bleed, not a candidate for full AC for a.flutter.   However, may consider low dose Hep SQ BID for DVT ppx in the future if hgb remain stable)    PCP Dr. Simons    DNR/DNI

## 2021-12-28 NOTE — PROGRESS NOTE ADULT - PROBLEM SELECTOR PLAN 1
Will continue coverage scale ac/hs. Will continue monitoring FS and FU.  Suggest DC on Tradjenta 5mg daily, discontinue prior hypoglycemic agents/insulin, endo FU 4 weeks.   for compliance with consistent low carb diet.

## 2021-12-28 NOTE — PROGRESS NOTE ADULT - ASSESSMENT
Assessment  DMT2: 71y Male with DM T2 with hyperglycemia, A1C 6.4%, was on insulin at home, now on insulin coverage only, blood sugars are stable and trending within acceptable range, no hypoglycemias, eating partial meals, appears comfortable, confused and forgetful.  Hypothyroidism: Patient has no history thyroid disease, was not on any thyroid supplements, subclinical with low-normal FT4, lethargic, started on synthroid 25 mcg po daily, repeat FT4 trending down, increased to synthroid  50 mcg po daily, FT4 improving.  Hypercalcemia: Started on Sensipar 60mg daily, Ca fluctuating/remains elevated..  CHF: on medications, stable, monitored.  HTN: Controlled,  on antihypertensive medications.  Parkinsons: on meds, monitored.  CKD: Monitor labs/BMP      Dr Ayers  Cell : [phone]

## 2021-12-28 NOTE — PROGRESS NOTE ADULT - SUBJECTIVE AND OBJECTIVE BOX
SUBJECTIVE / OVERNIGHT EVENTS:  No overnight events.  No complaints.  No cp, sob, abdominal pain.  No n/v/d. no HA/dizziness.   remain confused  ate 25% of breakfast     --------------------------------------------------------------------------------------------  LABS:    12-27    136  |  98  |  27<H>  ----------------------------<  95  4.0   |  24  |  4.00<H>    Ca    11.2<H>      27 Dec 2021 07:26  Phos  5.3     12-27  Mg     2.2     12-27      PT/INR - ( 27 Dec 2021 07:26 )   PT: 12.4 sec;   INR: 1.04 ratio         PTT - ( 27 Dec 2021 07:26 )  PTT:38.1 sec  CAPILLARY BLOOD GLUCOSE      POCT Blood Glucose.: 109 mg/dL (28 Dec 2021 21:51)  POCT Blood Glucose.: 103 mg/dL (28 Dec 2021 17:40)  POCT Blood Glucose.: 100 mg/dL (28 Dec 2021 12:51)  POCT Blood Glucose.: 121 mg/dL (28 Dec 2021 07:36)            RADIOLOGY & ADDITIONAL TESTS:    Imaging Personally Reviewed:  [x] YES  [ ] NO    Consultant(s) Notes Reviewed:  [x] YES  [ ] NO    MEDICATIONS  (STANDING):  atorvastatin 80 milliGRAM(s) Oral at bedtime  carbidopa/levodopa  25/100 2.5 Tablet(s) Oral <User Schedule>  carbidopa/levodopa  25/100 2 Tablet(s) Oral <User Schedule>  chlorhexidine 4% Liquid 1 Application(s) Topical <User Schedule>  cholecalciferol 1000 Unit(s) Oral daily  cinacalcet 60 milliGRAM(s) Oral daily  dextrose 40% Gel 15 Gram(s) Oral once  dextrose 5%. 1000 milliLiter(s) (50 mL/Hr) IV Continuous <Continuous>  dextrose 5%. 1000 milliLiter(s) (100 mL/Hr) IV Continuous <Continuous>  dextrose 50% Injectable 25 Gram(s) IV Push once  dextrose 50% Injectable 12.5 Gram(s) IV Push once  dextrose 50% Injectable 25 Gram(s) IV Push once  diVALproex Sprinkle 250 milliGRAM(s) Oral three times a day  DULoxetine 20 milliGRAM(s) Oral daily  folic acid 1 milliGRAM(s) Oral daily  glucagon  Injectable 1 milliGRAM(s) IntraMuscular once  insulin lispro (ADMELOG) corrective regimen sliding scale   SubCutaneous three times a day before meals  insulin lispro (ADMELOG) corrective regimen sliding scale   SubCutaneous at bedtime  levothyroxine 50 MICROGram(s) Oral daily  memantine 5 milliGRAM(s) Oral at bedtime  metoprolol tartrate 50 milliGRAM(s) Oral two times a day  midodrine 10 milliGRAM(s) Oral <User Schedule>  mirtazapine 7.5 milliGRAM(s) Oral daily  Nephro-celeste 1 Tablet(s) Oral daily  polyethylene glycol 3350 17 Gram(s) Oral two times a day  QUEtiapine 25 milliGRAM(s) Oral three times a day  senna Syrup 10 milliLiter(s) Oral at bedtime  trihexyphenidyl 2 milliGRAM(s) Oral three times a day    MEDICATIONS  (PRN):  acetaminophen     Tablet .. 650 milliGRAM(s) Oral every 6 hours PRN Mild Pain (1 - 3)  albuterol/ipratropium for Nebulization 3 milliLiter(s) Nebulizer every 6 hours PRN Shortness of Breath and/or Wheezing  diVALproex Sprinkle 125 milliGRAM(s) Oral every 8 hours PRN Agitation  guaiFENesin Oral Liquid (Sugar-Free) 200 milliGRAM(s) Oral every 6 hours PRN Cough  ondansetron Injectable 4 milliGRAM(s) IV Push once PRN Nausea and/or Vomiting  QUEtiapine 12.5 milliGRAM(s) Oral every 6 hours PRN agitation  sodium chloride 0.9% lock flush 10 milliLiter(s) IV Push every 1 hour PRN Pre/post blood products, medications, blood draw, and to maintain line patency      Care Discussed with Consultants/Other Providers [x] YES  [ ] NO    Vital Signs Last 24 Hrs  T(C): 37.1 (28 Dec 2021 21:53), Max: 37.1 (28 Dec 2021 21:53)  T(F): 98.7 (28 Dec 2021 21:53), Max: 98.7 (28 Dec 2021 21:53)  HR: 67 (28 Dec 2021 21:53) (67 - 117)  BP: 125/89 (28 Dec 2021 21:53) (112/45 - 133/77)  BP(mean): --  RR: 19 (28 Dec 2021 21:53) (17 - 19)  SpO2: 99% (28 Dec 2021 21:53) (96% - 99%)  I&O's Summary    27 Dec 2021 07:01  -  28 Dec 2021 07:00  --------------------------------------------------------  IN: 1160 mL / OUT: 0 mL / NET: 1160 mL    28 Dec 2021 07:01  -  28 Dec 2021 22:31  --------------------------------------------------------  IN: 1020 mL / OUT: 1300 mL / NET: -280 mL        PHYSICAL EXAM:  GENERAL: NAD, thin-elderly, comfortable  HEAD:  Atraumatic, Normocephalic  EYES: EOMI, PERRLA, conjunctiva and sclera clear  NECK: Supple, No JVD  CHEST/LUNG: mild decrease breath sounds bilaterally; No wheeze, Permcath in place   HEART: Regular rate and rhythm; No murmurs, rubs, or gallops  ABDOMEN: Soft, Nontender, Nondistended; Bowel sounds present  Neuro: AAOx0-1, no focal weakness. mittens in place.   EXTREMITIES:  2+ Peripheral Pulses, No clubbing, cyanosis, or edema  SKIN: No rashes or lesions

## 2021-12-28 NOTE — PROGRESS NOTE ADULT - NSPROGADDITIONALINFOA_GEN_ALL_CORE
Dr. Pineda will be covering for me starting 12/29/21. She can be reached at  if needed.     - Dr. MARCELINA Brown ([facility])  - [phone]
- Dr. MARCELINA Brown ([facility])  - [phone]
- Dr. MARCELINA Brown ([facility])  - [phone]

## 2021-12-28 NOTE — CHART NOTE - NSCHARTNOTEFT_GEN_A_CORE
Nutrition Follow Up Note  Patient seen for: calorie count follow-up. Chart reviewed, events noted.     "70yo M w/ PMHx of CAD (s/p CABG ), CKD (unknown stage), DM2, Parkinson's Disease, HTN, depression presents with new onset acute heart failure exacerbation, NSTEMI, started on hep gtt, developed bl RP bleed, acute anemia. sp MICU course for hemorrhagic shock, 10/28 sp IR embolization l4 and l5 lumbar artery. sp permacath and AVF."    Source: [] Patient       [x] Medical Record        [x] RN        [] Family at bedside       [] Other:    -If unable to interview patient: [] Trach/Vent/BiPAP  [] Disoriented/confused/inappropriate to interview    Diet Order:   Diet, Regular:   Supplement Feeding Modality:  Oral  Nepro Cans or Servings Per Day:  1       Frequency:  Daily (-)    - Is current order appropriate/adequate? [x] Yes  []  No:     - PO intake :   [] >75%  Adequate    [] 50-75%  Fair       [x] <50%  Poor    - Nutrition-related concerns:      - Per RN and per flowsheets, pt with poor PO intake.       - per chart, Lantus d/c  2/2 hypoglycemia.       - Per chart, pt ordered for Remeron since ;  pt additionally ordered for SSI admelog, Nephro-celeste, sinemet, Vitamin D3, and folic acid.      - Per note : "no improvement on remeron. son HCP is interested in artificial nutrition if necessary,"      - pt continues on HD    - CALORIE COUNT FOLLOW-UP:      - Calorie count conducted -. However, RD unable to locate calorie count sheet in pt's room and paper chart. Therefore unable to assess # kcal and g pro consumed on these dates. Nonetheless, no indication that PO intake is sufficient at this time.    GI: No nausea, vomiting, constipation, diarrhea noted in chart.  Last BM  per chart.   Bowel Regimen? [x] Yes (miralax, senna)   [] No      Weights:   Daily Weight in k.3 (-),  69.8 (-),  68.6 (-),  68.6 (-),  70.4 (-),  71 (12-23),  71 (12-23)  Noted with progressive wt decline, likely 2/2 poor PO intake. RD to continue to monitor weight trends as able/available.     MEDICATIONS  (STANDING):  atorvastatin 80 milliGRAM(s) Oral at bedtime  carbidopa/levodopa  25/100 2.5 Tablet(s) Oral <User Schedule>  carbidopa/levodopa  25/100 2 Tablet(s) Oral <User Schedule>  chlorhexidine 4% Liquid 1 Application(s) Topical <User Schedule>  cholecalciferol 1000 Unit(s) Oral daily  cinacalcet 60 milliGRAM(s) Oral daily  dextrose 40% Gel 15 Gram(s) Oral once  dextrose 5%. 1000 milliLiter(s) (50 mL/Hr) IV Continuous <Continuous>  dextrose 5%. 1000 milliLiter(s) (100 mL/Hr) IV Continuous <Continuous>  dextrose 50% Injectable 25 Gram(s) IV Push once  dextrose 50% Injectable 12.5 Gram(s) IV Push once  dextrose 50% Injectable 25 Gram(s) IV Push once  diVALproex Sprinkle 250 milliGRAM(s) Oral three times a day  DULoxetine 20 milliGRAM(s) Oral daily  folic acid 1 milliGRAM(s) Oral daily  glucagon  Injectable 1 milliGRAM(s) IntraMuscular once  insulin lispro (ADMELOG) corrective regimen sliding scale   SubCutaneous three times a day before meals  insulin lispro (ADMELOG) corrective regimen sliding scale   SubCutaneous at bedtime  levothyroxine 50 MICROGram(s) Oral daily  memantine 5 milliGRAM(s) Oral at bedtime  metoprolol tartrate 50 milliGRAM(s) Oral two times a day  midodrine 10 milliGRAM(s) Oral <User Schedule>  mirtazapine 7.5 milliGRAM(s) Oral daily  Nephro-celeste 1 Tablet(s) Oral daily  polyethylene glycol 3350 17 Gram(s) Oral two times a day  QUEtiapine 25 milliGRAM(s) Oral three times a day  senna Syrup 10 milliLiter(s) Oral at bedtime  trihexyphenidyl 2 milliGRAM(s) Oral three times a day    MEDICATIONS  (PRN):  acetaminophen     Tablet .. 650 milliGRAM(s) Oral every 6 hours PRN Mild Pain (1 - 3)  albuterol/ipratropium for Nebulization 3 milliLiter(s) Nebulizer every 6 hours PRN Shortness of Breath and/or Wheezing  diVALproex Sprinkle 125 milliGRAM(s) Oral every 8 hours PRN Agitation  guaiFENesin Oral Liquid (Sugar-Free) 200 milliGRAM(s) Oral every 6 hours PRN Cough  ondansetron Injectable 4 milliGRAM(s) IV Push once PRN Nausea and/or Vomiting  QUEtiapine 12.5 milliGRAM(s) Oral every 6 hours PRN agitation  sodium chloride 0.9% lock flush 10 milliLiter(s) IV Push every 1 hour PRN Pre/post blood products, medications, blood draw, and to maintain line patency    Pertinent Labs:   A1C with Estimated Average Glucose Result: 6.4 % (10-22-21 @ 08:56)    Finger Sticks:  POCT Blood Glucose.: 100 mg/dL ( @ 12:51)  POCT Blood Glucose.: 121 mg/dL ( @ 07:36)  POCT Blood Glucose.: 162 mg/dL ( @ 22:18)  POCT Blood Glucose.: 103 mg/dL ( @ 16:54)      Skin per nursing documentation: No pressure injuries noted.  Edema per nursing documentation: none noted    Estimated Needs:   [x] no change since previous assessment  Estimated Energy Needs: 1482-7013 kcal/day (30-35 kcal/kg)  Estimated Protein Needs:  gm protein/day (1.2-1.4 g/kg)  Defer fluid needs to team  based on IBW 78 kg    Previous Nutrition Diagnosis: Increased Nutrient Needs;  Food and Nutrition Related Knowledge Deficit;  Severe acute on chronic malnutrition  Nutrition Diagnosis is: [x] ongoing  [] resolved [] not applicable   Being addressed with liberalized PO diet, micronutrient supplementation, and oral nutrition supplements     Nutrition Care Plan:  [x] In Progress  [] Achieved  [] Not applicable    New Nutrition Diagnosis: [x] Not applicable    Nutrition Interventions:     Education Provided   [] Yes:  [x] No: pt with AMS     Recommendations:      1) Continue Regular diet with Nepro 1x/day.  2) Continue Nephro-celeste if no medical contraindications.  3) Nutrition plan of care to be in line with medical GOC and pt/family wishes. Would recommend alternative means to improve appetite and/or alternative means of nutrition if within GOC.  4) Monitor PO intake, GI tolerance, skin integrity, labs, weight.  5) malnutrition alert in chart     Monitoring and Evaluation:   Continue to monitor nutritional intake, tolerance to diet prescription, weights, labs, skin integrity    RD remains available upon request and will follow up per protocol  Maribel Gifford RDN, CDN Pager #[phone]

## 2021-12-29 NOTE — PROGRESS NOTE ADULT - SUBJECTIVE AND OBJECTIVE BOX
NEPHROLOGY-[facility] [phone]        Patient seen and examined in bed.  He is about the same         MEDICATIONS  (STANDING):  atorvastatin 80 milliGRAM(s) Oral at bedtime  carbidopa/levodopa  25/100 2.5 Tablet(s) Oral <User Schedule>  carbidopa/levodopa  25/100 2 Tablet(s) Oral <User Schedule>  chlorhexidine 4% Liquid 1 Application(s) Topical <User Schedule>  cholecalciferol 1000 Unit(s) Oral daily  cinacalcet 60 milliGRAM(s) Oral daily  dextrose 40% Gel 15 Gram(s) Oral once  dextrose 5%. 1000 milliLiter(s) (50 mL/Hr) IV Continuous <Continuous>  dextrose 5%. 1000 milliLiter(s) (100 mL/Hr) IV Continuous <Continuous>  dextrose 50% Injectable 25 Gram(s) IV Push once  dextrose 50% Injectable 12.5 Gram(s) IV Push once  dextrose 50% Injectable 25 Gram(s) IV Push once  diVALproex Sprinkle 250 milliGRAM(s) Oral three times a day  DULoxetine 20 milliGRAM(s) Oral daily  folic acid 1 milliGRAM(s) Oral daily  glucagon  Injectable 1 milliGRAM(s) IntraMuscular once  insulin lispro (ADMELOG) corrective regimen sliding scale   SubCutaneous three times a day before meals  insulin lispro (ADMELOG) corrective regimen sliding scale   SubCutaneous at bedtime  levothyroxine 50 MICROGram(s) Oral daily  memantine 5 milliGRAM(s) Oral at bedtime  metoprolol tartrate 50 milliGRAM(s) Oral two times a day  midodrine 10 milliGRAM(s) Oral <User Schedule>  mirtazapine 7.5 milliGRAM(s) Oral daily  Nephro-celeste 1 Tablet(s) Oral daily  polyethylene glycol 3350 17 Gram(s) Oral two times a day  QUEtiapine 25 milliGRAM(s) Oral three times a day  senna Syrup 10 milliLiter(s) Oral at bedtime  trihexyphenidyl 2 milliGRAM(s) Oral three times a day      VITAL:  T(C): , Max: 37.1 (12-28-21 @ 21:53)  T(F): , Max: 98.7 (12-28-21 @ 21:53)  HR: 100 (12-29-21 @ 05:35)  BP: 121/83 (12-29-21 @ 05:35)  BP(mean): 96 (12-29-21 @ 05:35)  RR: 18 (12-29-21 @ 05:35)  SpO2: 97% (12-29-21 @ 05:35)  Wt(kg): --    I and O's:    12-28 @ 07:01  -  12-29 @ 07:00  --------------------------------------------------------  IN: 1020 mL / OUT: 1300 mL / NET: -280 mL          PHYSICAL EXAM:    Constitutional: NAD  Neck:  No JVD  Respiratory: CTAB/L  Cardiovascular: S1 and S2  Gastrointestinal: BS+, soft, NT/ND  Extremities: No peripheral edema  Neurological: A/O x 3, no focal deficits  Psychiatric: Normal mood, normal affect  : No Javier  Skin: No rashes  Access: perm cath and avf     LABS:                Urine Studies:          RADIOLOGY & ADDITIONAL STUDIES:             NEPHROLOGY-[facility] [phone]        Patient seen and examined in bed.  He is about the same         MEDICATIONS  (STANDING):  atorvastatin 80 milliGRAM(s) Oral at bedtime  carbidopa/levodopa  25/100 2.5 Tablet(s) Oral <User Schedule>  carbidopa/levodopa  25/100 2 Tablet(s) Oral <User Schedule>  chlorhexidine 4% Liquid 1 Application(s) Topical <User Schedule>  cholecalciferol 1000 Unit(s) Oral daily  cinacalcet 60 milliGRAM(s) Oral daily  dextrose 40% Gel 15 Gram(s) Oral once  dextrose 5%. 1000 milliLiter(s) (50 mL/Hr) IV Continuous <Continuous>  dextrose 5%. 1000 milliLiter(s) (100 mL/Hr) IV Continuous <Continuous>  dextrose 50% Injectable 25 Gram(s) IV Push once  dextrose 50% Injectable 12.5 Gram(s) IV Push once  dextrose 50% Injectable 25 Gram(s) IV Push once  diVALproex Sprinkle 250 milliGRAM(s) Oral three times a day  DULoxetine 20 milliGRAM(s) Oral daily  folic acid 1 milliGRAM(s) Oral daily  glucagon  Injectable 1 milliGRAM(s) IntraMuscular once  insulin lispro (ADMELOG) corrective regimen sliding scale   SubCutaneous three times a day before meals  insulin lispro (ADMELOG) corrective regimen sliding scale   SubCutaneous at bedtime  levothyroxine 50 MICROGram(s) Oral daily  memantine 5 milliGRAM(s) Oral at bedtime  metoprolol tartrate 50 milliGRAM(s) Oral two times a day  midodrine 10 milliGRAM(s) Oral <User Schedule>  mirtazapine 7.5 milliGRAM(s) Oral daily  Nephro-celeste 1 Tablet(s) Oral daily  polyethylene glycol 3350 17 Gram(s) Oral two times a day  QUEtiapine 25 milliGRAM(s) Oral three times a day  senna Syrup 10 milliLiter(s) Oral at bedtime  trihexyphenidyl 2 milliGRAM(s) Oral three times a day      VITAL:  T(C): , Max: 37.1 (12-28-21 @ 21:53)  T(F): , Max: 98.7 (12-28-21 @ 21:53)  HR: 100 (12-29-21 @ 05:35)  BP: 121/83 (12-29-21 @ 05:35)  BP(mean): 96 (12-29-21 @ 05:35)  RR: 18 (12-29-21 @ 05:35)  SpO2: 97% (12-29-21 @ 05:35)  Wt(kg): --    I and O's:    12-28 @ 07:01  -  12-29 @ 07:00  --------------------------------------------------------  IN: 1020 mL / OUT: 1300 mL / NET: -280 mL          PHYSICAL EXAM:    Constitutional: NAD  Neck:  No JVD  Respiratory: CTAB/L  Cardiovascular: S1 and S2  Gastrointestinal: BS+, soft, NT/ND  Extremities: No peripheral edema  Neurological:  , no focal deficits  Psychiatric: Normal mood, normal affect  : No Javier  Skin: No rashes  Access: perm cath and avf     LABS:                Urine Studies:          RADIOLOGY & ADDITIONAL STUDIES:

## 2021-12-29 NOTE — PROGRESS NOTE ADULT - ASSESSMENT
70yo M w/ PMHx of CAD (s/p CABG 2019), CKD (unknown stage), DM2, Parkinson's Disease, HTN, depression presents with new onset acute heart failure exacerbation, NSTEMI, started on hep gtt, developed bl RP bleed, acute anemia. sp MICU course for hemorrhagic shock, 10/28 sp IR embolization l4 and l5 lumbar artery. sp permacath and AVF.    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 related paranoia and agitation    # DM2 (diabetes mellitus, type 2)  per endo  hba1c 6.4    # CAD (coronary artery disease)  # HTN  lopressor, atorvastatin  asa on hold    # FTT  no improvement on remeron, cont to monitor, calorie count  son HCP is interested in artificial nutrition if necessary, GI consult  cont supplement shakes  palliative reconsult to address nutrition goc    # surveillance COVID  positive 12/29  monitor o2 sats  will hold off on remedesivir or decadron for now    DVT ppx  venodynes  (Given increase risk factors, fall risk, extensive RP bleed, not a candidate for full AC for a.flutter.   However, may consider low dose Hep SQ BID for DVT ppx in the future if hgb remain stable)    PCP Dr. Simons    DNR/DNI    [facility]  [phone]

## 2021-12-29 NOTE — PROGRESS NOTE ADULT - ASSESSMENT
ASSESSMENT/PLAN  70yo M w/ PMHx of CAD (s/p CABG 2019), CKD (unknown stage), DM2, Parkinson's Disease, HTN, depression presents with bilateral leg swelling  ESRD   Severe LV dysfunction; A flutter    + COVID      1 Palliation - Intermittent confusion ;  Can the seroquel be tapered....Seems to be too lethargic   2 Renal-  HD now   3 CVS- BP controlled at present, on Midodrine with parameters, Lopressor for heart rate control   4 Anemia - hgb improving as of late, check CBC  5 Vasc - s/p  LUE AVF--Immature (this will take weeks to months to mature) ;  Perm cath   6 Endo - Off the lantus at present due to hypoglycemia   7 GI-on  Remeron now  8 ID-Covid positive but asymptomatic     Sayed [facility]   [phone]    ASSESSMENT/PLAN  72yo M w/ PMHx of CAD (s/p CABG 2019), CKD (unknown stage), DM2, Parkinson's Disease, HTN, depression presents with bilateral leg swelling  ESRD   Severe LV dysfunction; A flutter    + COVID      1 Palliation - Intermittent confusion ;  Can the seroquel be tapered....Seems to be too lethargic   2 Renal-  HD in am   3 CVS- BP controlled at present, on Midodrine with parameters, Lopressor for heart rate control   4 Anemia - hgb improving as of late, check CBC  5 Vasc - s/p  LUE AVF--Immature (this will take weeks to months to mature) ;  Perm cath   6 Endo - Off the lantus at present due to hypoglycemia   7 GI-on  Remeron now  8 ID-Covid positive but asymptomatic     Sayed [facility]   [phone]

## 2021-12-29 NOTE — PROGRESS NOTE ADULT - SUBJECTIVE AND OBJECTIVE BOX
Patient is a 71y old  Male who presents with a chief complaint of leg swelling (29 Dec 2021 14:31)      INTERVAL HPI/OVERNIGHT EVENTS: noted  pt seen and examined this am   events noted  mittens on, lethargic      Vital Signs Last 24 Hrs  T(C): 36.8 (29 Dec 2021 14:50), Max: 37.1 (28 Dec 2021 21:53)  T(F): 98.3 (29 Dec 2021 14:50), Max: 98.7 (28 Dec 2021 21:53)  HR: 87 (29 Dec 2021 15:22) (67 - 100)  BP: 121/81 (29 Dec 2021 15:22) (121/81 - 144/78)  BP(mean): 96 (29 Dec 2021 05:35) (96 - 96)  RR: 18 (29 Dec 2021 15:22) (18 - 19)  SpO2: 98% (29 Dec 2021 15:22) (96% - 99%)    acetaminophen     Tablet .. 650 milliGRAM(s) Oral every 6 hours PRN  albuterol/ipratropium for Nebulization 3 milliLiter(s) Nebulizer every 6 hours PRN  atorvastatin 80 milliGRAM(s) Oral at bedtime  carbidopa/levodopa  25/100 2.5 Tablet(s) Oral <User Schedule>  carbidopa/levodopa  25/100 2 Tablet(s) Oral <User Schedule>  chlorhexidine 4% Liquid 1 Application(s) Topical <User Schedule>  cholecalciferol 1000 Unit(s) Oral daily  cinacalcet 60 milliGRAM(s) Oral daily  dextrose 40% Gel 15 Gram(s) Oral once  dextrose 5%. 1000 milliLiter(s) IV Continuous <Continuous>  dextrose 5%. 1000 milliLiter(s) IV Continuous <Continuous>  dextrose 50% Injectable 25 Gram(s) IV Push once  dextrose 50% Injectable 12.5 Gram(s) IV Push once  dextrose 50% Injectable 25 Gram(s) IV Push once  diVALproex Sprinkle 125 milliGRAM(s) Oral every 8 hours PRN  diVALproex Sprinkle 250 milliGRAM(s) Oral three times a day  DULoxetine 20 milliGRAM(s) Oral daily  folic acid 1 milliGRAM(s) Oral daily  glucagon  Injectable 1 milliGRAM(s) IntraMuscular once  guaiFENesin Oral Liquid (Sugar-Free) 200 milliGRAM(s) Oral every 6 hours PRN  insulin lispro (ADMELOG) corrective regimen sliding scale   SubCutaneous three times a day before meals  insulin lispro (ADMELOG) corrective regimen sliding scale   SubCutaneous at bedtime  levothyroxine 50 MICROGram(s) Oral daily  memantine 5 milliGRAM(s) Oral at bedtime  metoprolol tartrate 50 milliGRAM(s) Oral two times a day  midodrine 10 milliGRAM(s) Oral <User Schedule>  mirtazapine 7.5 milliGRAM(s) Oral daily  Nephro-celeste 1 Tablet(s) Oral daily  ondansetron Injectable 4 milliGRAM(s) IV Push once PRN  polyethylene glycol 3350 17 Gram(s) Oral two times a day  QUEtiapine 25 milliGRAM(s) Oral three times a day  QUEtiapine 12.5 milliGRAM(s) Oral every 6 hours PRN  senna Syrup 10 milliLiter(s) Oral at bedtime  sodium chloride 0.9% lock flush 10 milliLiter(s) IV Push every 1 hour PRN  trihexyphenidyl 2 milliGRAM(s) Oral three times a day      PHYSICAL EXAM:  GENERAL: NAD,   EYES: conjunctiva and sclera clear  ENMT: Moist mucous membranes  NECK: Supple, No JVD, Normal thyroid  CHEST/LUNG: non labored, cta b/l  HEART: Regular rate and rhythm; No murmurs, rubs, or gallops  ABDOMEN: Soft, Nontender, Nondistended; Bowel sounds present  EXTREMITIES:  2+ Peripheral Pulses, No clubbing, cyanosis, or edema  LYMPH: No lymphadenopathy noted  SKIN: No rashes or lesions    Consultant(s) Notes Reviewed:  [x ] YES  [ ] NO  Care Discussed with Consultants/Other Providers [ x] YES  [ ] NO    LABS:              CAPILLARY BLOOD GLUCOSE      POCT Blood Glucose.: 117 mg/dL (29 Dec 2021 16:47)  POCT Blood Glucose.: 113 mg/dL (29 Dec 2021 12:50)  POCT Blood Glucose.: 107 mg/dL (29 Dec 2021 08:58)  POCT Blood Glucose.: 109 mg/dL (28 Dec 2021 21:51)              RADIOLOGY & ADDITIONAL TESTS:    Imaging Personally Reviewed:  [x ] YES  [ ] NO

## 2021-12-29 NOTE — PROGRESS NOTE ADULT - SUBJECTIVE AND OBJECTIVE BOX
Chief complaint  Patient is a 71y old  Male who presents with a chief complaint of leg swelling (29 Dec 2021 10:14)   Review of systems  Events noted, patient tested covid+, transferred to covid unit.  Patient in bed, looks comfortable, no hypoglycemic episodes.    Labs and Fingersticks  CAPILLARY BLOOD GLUCOSE      POCT Blood Glucose.: 113 mg/dL (29 Dec 2021 12:50)  POCT Blood Glucose.: 107 mg/dL (29 Dec 2021 08:58)  POCT Blood Glucose.: 109 mg/dL (28 Dec 2021 21:51)  POCT Blood Glucose.: 103 mg/dL (28 Dec 2021 17:40)    Medications  MEDICATIONS  (STANDING):  atorvastatin 80 milliGRAM(s) Oral at bedtime  carbidopa/levodopa  25/100 2.5 Tablet(s) Oral <User Schedule>  carbidopa/levodopa  25/100 2 Tablet(s) Oral <User Schedule>  chlorhexidine 4% Liquid 1 Application(s) Topical <User Schedule>  cholecalciferol 1000 Unit(s) Oral daily  cinacalcet 60 milliGRAM(s) Oral daily  dextrose 40% Gel 15 Gram(s) Oral once  dextrose 5%. 1000 milliLiter(s) (50 mL/Hr) IV Continuous <Continuous>  dextrose 5%. 1000 milliLiter(s) (100 mL/Hr) IV Continuous <Continuous>  dextrose 50% Injectable 25 Gram(s) IV Push once  dextrose 50% Injectable 12.5 Gram(s) IV Push once  dextrose 50% Injectable 25 Gram(s) IV Push once  diVALproex Sprinkle 250 milliGRAM(s) Oral three times a day  DULoxetine 20 milliGRAM(s) Oral daily  folic acid 1 milliGRAM(s) Oral daily  glucagon  Injectable 1 milliGRAM(s) IntraMuscular once  insulin lispro (ADMELOG) corrective regimen sliding scale   SubCutaneous three times a day before meals  insulin lispro (ADMELOG) corrective regimen sliding scale   SubCutaneous at bedtime  levothyroxine 50 MICROGram(s) Oral daily  memantine 5 milliGRAM(s) Oral at bedtime  metoprolol tartrate 50 milliGRAM(s) Oral two times a day  midodrine 10 milliGRAM(s) Oral <User Schedule>  mirtazapine 7.5 milliGRAM(s) Oral daily  Nephro-celeste 1 Tablet(s) Oral daily  polyethylene glycol 3350 17 Gram(s) Oral two times a day  QUEtiapine 25 milliGRAM(s) Oral three times a day  senna Syrup 10 milliLiter(s) Oral at bedtime  trihexyphenidyl 2 milliGRAM(s) Oral three times a day      Physical Exam  General: Patient comfortable in bed  Vital Signs Last 12 Hrs  T(F): 98.4 (12-29-21 @ 10:35), Max: 98.4 (12-29-21 @ 10:35)  HR: 97 (12-29-21 @ 10:35) (97 - 100)  BP: 137/70 (12-29-21 @ 10:35) (121/83 - 137/70)  BP(mean): 96 (12-29-21 @ 05:35) (96 - 96)  RR: 18 (12-29-21 @ 10:35) (18 - 18)  SpO2: 96% (12-29-21 @ 10:35) (96% - 99%)  Neck: No palpable thyroid nodules.  CVS: S1S2, No murmurs  Respiratory: No wheezing, no crepitations  GI: Abdomen soft, bowel sounds positive  Musculoskeletal:  edema lower extremities.   Skin: No skin rashes, no ecchymosis    Diagnostics    Free Thyroxine, Serum: AM Sched. Collection: 16-Dec-2021 06:00 (12-15 @ 14:39)  Free Thyroxine, Serum: AM Sched. Collection: 08-Dec-2021 06:00 (12-07 @ 14:26)

## 2021-12-29 NOTE — PROGRESS NOTE ADULT - ASSESSMENT
Assessment  DMT2: 71y Male with DM T2 with hyperglycemia, A1C 6.4%, was on insulin at home, now on insulin coverage only, blood sugars are stable and trending within acceptable range, no hypoglycemias. Patient tested covid+, transferred to covid unit, appears comfortable in NAD.  Hypothyroidism: Patient has no history thyroid disease, was not on any thyroid supplements, subclinical with low-normal FT4, lethargic, started on synthroid 25 mcg po daily, repeat FT4 trending down, increased to synthroid  50 mcg po daily, FT4 improving.  Hypercalcemia: Started on Sensipar 60mg daily, Ca fluctuating/remains elevated..  CHF: on medications, stable, monitored.  HTN: Controlled,  on antihypertensive medications.  Parkinsons: on meds, monitored.  CKD: Monitor labs/BMP      Dr Ayers  Cell : [phone]

## 2021-12-30 NOTE — PROGRESS NOTE ADULT - ASSESSMENT
70yo M w/ PMHx of CAD (s/p CABG 2019), CKD (unknown stage), DM2, Parkinson's Disease, HTN, depression presents with new onset acute heart failure exacerbation, NSTEMI, started on hep gtt, developed bl RP bleed, acute anemia. sp MICU course for hemorrhagic shock, 10/28 sp IR embolization l4 and l5 lumbar artery. sp permacath and AVF.    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 related paranoia and agitation    # DM2 (diabetes mellitus, type 2)  per endo  hba1c 6.4    # CAD (coronary artery disease)  # HTN  lopressor, atorvastatin  asa on hold    # FTT  no improvement on remeron, cont to monitor, calorie count  son HCP is interested in artificial nutrition if necessary, GI consult  cont supplement shakes  palliative reconsult to address nutrition goc    # surveillance COVID  positive 12/29  monitor o2 sats- sating weell on RA  will hold off on remedesivir or decadron for now    DVT ppx  venodynes  (Given increase risk factors, fall risk, extensive RP bleed, not a candidate for full AC for a.flutter.   However, may consider low dose Hep SQ BID for DVT ppx in the future if hgb remain stable)    PCP Dr. Simons    DNR/DNI    [facility]  [phone]

## 2021-12-30 NOTE — PROGRESS NOTE ADULT - ASSESSMENT
ASSESSMENT/PLAN  70yo M w/ PMHx of CAD (s/p CABG 2019), CKD (unknown stage), DM2, Parkinson's Disease, HTN, depression presents with bilateral leg swelling  ESRD   Severe LV dysfunction; A flutter    + COVID      1 Palliation - Intermittent confusion ;  Can the seroquel be tapered....Seems to be too lethargic   2 Renal-  HD today and then again Sunday   3 CVS- BP controlled at present, on Midodrine with parameters, Lopressor for heart rate control   4 Anemia - hgb improving as of late, check CBC  5 Vasc - s/p  LUE AVF--Immature (this will take weeks to months to mature) ;  If he stays longer then will try for BAM maturation via IR ;  Perm cath   6 Endo - Off the lantus at present due to hypoglycemia   7 GI-on  Remeron now  8 ID-Covid positive but asymptomatic     [facility]   [employer]   [phone]

## 2021-12-30 NOTE — PROGRESS NOTE ADULT - SUBJECTIVE AND OBJECTIVE BOX
Patient is a 71y old  Male who presents with a chief complaint of leg swelling (30 Dec 2021 09:52)      INTERVAL HPI/OVERNIGHT EVENTS: noted  pt seen and examined this am   events noted  sating well on RA      Vital Signs Last 24 Hrs  T(C): 36.4 (30 Dec 2021 11:57), Max: 36.8 (29 Dec 2021 14:50)  T(F): 97.6 (30 Dec 2021 11:57), Max: 98.3 (29 Dec 2021 14:50)  HR: 89 (30 Dec 2021 11:57) (79 - 92)  BP: 127/84 (30 Dec 2021 11:57) (121/81 - 144/78)  BP(mean): --  RR: 18 (30 Dec 2021 11:57) (18 - 18)  SpO2: 96% (30 Dec 2021 11:57) (96% - 98%)    acetaminophen     Tablet .. 650 milliGRAM(s) Oral every 6 hours PRN  albuterol/ipratropium for Nebulization 3 milliLiter(s) Nebulizer every 6 hours PRN  atorvastatin 80 milliGRAM(s) Oral at bedtime  carbidopa/levodopa  25/100 2.5 Tablet(s) Oral <User Schedule>  carbidopa/levodopa  25/100 2 Tablet(s) Oral <User Schedule>  chlorhexidine 4% Liquid 1 Application(s) Topical <User Schedule>  cholecalciferol 1000 Unit(s) Oral daily  cinacalcet 60 milliGRAM(s) Oral daily  dextrose 40% Gel 15 Gram(s) Oral once  dextrose 5%. 1000 milliLiter(s) IV Continuous <Continuous>  dextrose 5%. 1000 milliLiter(s) IV Continuous <Continuous>  dextrose 50% Injectable 25 Gram(s) IV Push once  dextrose 50% Injectable 12.5 Gram(s) IV Push once  dextrose 50% Injectable 25 Gram(s) IV Push once  diVALproex Sprinkle 125 milliGRAM(s) Oral every 8 hours PRN  diVALproex Sprinkle 250 milliGRAM(s) Oral three times a day  DULoxetine 20 milliGRAM(s) Oral daily  folic acid 1 milliGRAM(s) Oral daily  glucagon  Injectable 1 milliGRAM(s) IntraMuscular once  guaiFENesin Oral Liquid (Sugar-Free) 200 milliGRAM(s) Oral every 6 hours PRN  insulin lispro (ADMELOG) corrective regimen sliding scale   SubCutaneous three times a day before meals  insulin lispro (ADMELOG) corrective regimen sliding scale   SubCutaneous at bedtime  levothyroxine 50 MICROGram(s) Oral daily  memantine 5 milliGRAM(s) Oral at bedtime  metoprolol tartrate 50 milliGRAM(s) Oral two times a day  midodrine 10 milliGRAM(s) Oral <User Schedule>  mirtazapine 7.5 milliGRAM(s) Oral daily  Nephro-celeste 1 Tablet(s) Oral daily  ondansetron Injectable 4 milliGRAM(s) IV Push once PRN  polyethylene glycol 3350 17 Gram(s) Oral two times a day  QUEtiapine 25 milliGRAM(s) Oral three times a day  QUEtiapine 12.5 milliGRAM(s) Oral every 6 hours PRN  senna Syrup 10 milliLiter(s) Oral at bedtime  sodium chloride 0.9% lock flush 10 milliLiter(s) IV Push every 1 hour PRN  trihexyphenidyl 2 milliGRAM(s) Oral three times a day      PHYSICAL EXAM:  GENERAL: NAD,   EYES: conjunctiva and sclera clear  ENMT: Moist mucous membranes  NECK: Supple, No JVD, Normal thyroid  CHEST/LUNG: non labored, cta b/l  HEART: Regular rate and rhythm; No murmurs, rubs, or gallops  ABDOMEN: Soft, Nontender, Nondistended; Bowel sounds present  EXTREMITIES:  2+ Peripheral Pulses, No clubbing, cyanosis, or edema  LYMPH: No lymphadenopathy noted  SKIN: No rashes or lesions    Consultant(s) Notes Reviewed:  [x ] YES  [ ] NO  Care Discussed with Consultants/Other Providers [ x] YES  [ ] NO    LABS:              CAPILLARY BLOOD GLUCOSE      POCT Blood Glucose.: 114 mg/dL (30 Dec 2021 11:42)  POCT Blood Glucose.: 93 mg/dL (30 Dec 2021 08:25)  POCT Blood Glucose.: 114 mg/dL (29 Dec 2021 21:55)  POCT Blood Glucose.: 117 mg/dL (29 Dec 2021 16:47)              RADIOLOGY & ADDITIONAL TESTS:    Imaging Personally Reviewed:  [x ] YES  [ ] NO

## 2021-12-30 NOTE — PROGRESS NOTE ADULT - SUBJECTIVE AND OBJECTIVE BOX
Chief complaint  Patient is a 71y old  Male who presents with a chief complaint of leg swelling (30 Dec 2021 14:09)   Review of systems  Patient in bed, looks comfortable, no hypoglycemic episodes.    Labs and Fingersticks  CAPILLARY BLOOD GLUCOSE      POCT Blood Glucose.: 114 mg/dL (30 Dec 2021 11:42)  POCT Blood Glucose.: 93 mg/dL (30 Dec 2021 08:25)  POCT Blood Glucose.: 114 mg/dL (29 Dec 2021 21:55)  POCT Blood Glucose.: 117 mg/dL (29 Dec 2021 16:47)    Medications  MEDICATIONS  (STANDING):  atorvastatin 80 milliGRAM(s) Oral at bedtime  carbidopa/levodopa  25/100 2.5 Tablet(s) Oral <User Schedule>  carbidopa/levodopa  25/100 2 Tablet(s) Oral <User Schedule>  chlorhexidine 4% Liquid 1 Application(s) Topical <User Schedule>  cholecalciferol 1000 Unit(s) Oral daily  cinacalcet 60 milliGRAM(s) Oral daily  dextrose 40% Gel 15 Gram(s) Oral once  dextrose 5%. 1000 milliLiter(s) (50 mL/Hr) IV Continuous <Continuous>  dextrose 5%. 1000 milliLiter(s) (100 mL/Hr) IV Continuous <Continuous>  dextrose 50% Injectable 25 Gram(s) IV Push once  dextrose 50% Injectable 12.5 Gram(s) IV Push once  dextrose 50% Injectable 25 Gram(s) IV Push once  diVALproex Sprinkle 250 milliGRAM(s) Oral three times a day  DULoxetine 20 milliGRAM(s) Oral daily  folic acid 1 milliGRAM(s) Oral daily  glucagon  Injectable 1 milliGRAM(s) IntraMuscular once  heparin   Injectable 5000 Unit(s) SubCutaneous every 12 hours  insulin lispro (ADMELOG) corrective regimen sliding scale   SubCutaneous three times a day before meals  insulin lispro (ADMELOG) corrective regimen sliding scale   SubCutaneous at bedtime  levothyroxine 50 MICROGram(s) Oral daily  memantine 5 milliGRAM(s) Oral at bedtime  metoprolol tartrate 50 milliGRAM(s) Oral two times a day  midodrine 10 milliGRAM(s) Oral <User Schedule>  mirtazapine 7.5 milliGRAM(s) Oral daily  Nephro-ceelste 1 Tablet(s) Oral daily  polyethylene glycol 3350 17 Gram(s) Oral two times a day  QUEtiapine 25 milliGRAM(s) Oral three times a day  senna Syrup 10 milliLiter(s) Oral at bedtime  trihexyphenidyl 2 milliGRAM(s) Oral three times a day      Physical Exam  General: Patient comfortable in bed  Vital Signs Last 12 Hrs  T(F): 97.6 (12-30-21 @ 11:57), Max: 97.8 (12-30-21 @ 04:33)  HR: 89 (12-30-21 @ 11:57) (79 - 89)  BP: 127/84 (12-30-21 @ 11:57) (122/81 - 127/84)  BP(mean): --  RR: 18 (12-30-21 @ 11:57) (18 - 18)  SpO2: 96% (12-30-21 @ 11:57) (96% - 97%)      Diagnostics    Free Thyroxine, Serum: AM Sched. Collection: 16-Dec-2021 06:00 (12-15 @ 14:39)  Free Thyroxine, Serum: AM Sched. Collection: 08-Dec-2021 06:00 (12-07 @ 14:26)

## 2021-12-30 NOTE — PROGRESS NOTE ADULT - PROBLEM SELECTOR PLAN 3
Will continue synthroid 50 mcg po daily. Will continue monitoring and FU.  Suggest to FU outpatient in 4-6 weeks to repeat TFTs.  Discussed plan with patient. [email or web address]

## 2021-12-30 NOTE — PROGRESS NOTE ADULT - ASSESSMENT
Assessment  DMT2: 71y Male with DM T2 with hyperglycemia, A1C 6.4%, was on insulin at home, now on insulin coverage only, blood sugars are stable and trending within acceptable range, no hypoglycemias. Patient tested covid+, breathing comfortable on room air, holding off on remdesevir/dexa for now.  Hypothyroidism: Patient has no history thyroid disease, was not on any thyroid supplements, subclinical with low-normal FT4, lethargic, started on synthroid 25 mcg po daily, repeat FT4 trending down, increased to synthroid  50 mcg po daily, FT4 improving.  Hypercalcemia: Started on Sensipar 60mg daily, Ca fluctuating/remains elevated..  CHF: on medications, stable, monitored.  HTN: Controlled,  on antihypertensive medications.  Parkinsons: on meds, monitored.  CKD: Monitor labs/BMP      Dr Ayers  Cell : [phone]

## 2021-12-30 NOTE — PROGRESS NOTE ADULT - SUBJECTIVE AND OBJECTIVE BOX
NEPHROLOGY[facility] [phone]        Patient seen and examined in bed.  He was the same         MEDICATIONS  (STANDING):  atorvastatin 80 milliGRAM(s) Oral at bedtime  carbidopa/levodopa  25/100 2.5 Tablet(s) Oral <User Schedule>  carbidopa/levodopa  25/100 2 Tablet(s) Oral <User Schedule>  chlorhexidine 4% Liquid 1 Application(s) Topical <User Schedule>  cholecalciferol 1000 Unit(s) Oral daily  cinacalcet 60 milliGRAM(s) Oral daily  dextrose 40% Gel 15 Gram(s) Oral once  dextrose 5%. 1000 milliLiter(s) (50 mL/Hr) IV Continuous <Continuous>  dextrose 5%. 1000 milliLiter(s) (100 mL/Hr) IV Continuous <Continuous>  dextrose 50% Injectable 25 Gram(s) IV Push once  dextrose 50% Injectable 12.5 Gram(s) IV Push once  dextrose 50% Injectable 25 Gram(s) IV Push once  diVALproex Sprinkle 250 milliGRAM(s) Oral three times a day  DULoxetine 20 milliGRAM(s) Oral daily  folic acid 1 milliGRAM(s) Oral daily  glucagon  Injectable 1 milliGRAM(s) IntraMuscular once  insulin lispro (ADMELOG) corrective regimen sliding scale   SubCutaneous three times a day before meals  insulin lispro (ADMELOG) corrective regimen sliding scale   SubCutaneous at bedtime  levothyroxine 50 MICROGram(s) Oral daily  memantine 5 milliGRAM(s) Oral at bedtime  metoprolol tartrate 50 milliGRAM(s) Oral two times a day  midodrine 10 milliGRAM(s) Oral <User Schedule>  mirtazapine 7.5 milliGRAM(s) Oral daily  Nephro-celeste 1 Tablet(s) Oral daily  polyethylene glycol 3350 17 Gram(s) Oral two times a day  QUEtiapine 25 milliGRAM(s) Oral three times a day  senna Syrup 10 milliLiter(s) Oral at bedtime  trihexyphenidyl 2 milliGRAM(s) Oral three times a day      VITAL:  T(C): , Max: 36.9 (12-29-21 @ 10:35)  T(F): , Max: 98.4 (12-29-21 @ 10:35)  HR: 79 (12-30-21 @ 04:33)  BP: 122/81 (12-30-21 @ 04:33)  BP(mean): --  RR: 18 (12-30-21 @ 04:33)  SpO2: 97% (12-30-21 @ 04:33)  Wt(kg): --    I and O's:    12-29 @ 07:01  -  12-30 @ 07:00  --------------------------------------------------------  IN: 110 mL / OUT: 0 mL / NET: 110 mL          PHYSICAL EXAM:    Constitutional: NAD  Neck:  No JVD  Respiratory: CTAB/L  Cardiovascular: S1 and S2  Gastrointestinal: BS+, soft, NT/ND  Extremities: No peripheral edema  Neurological: A/O x 3, no focal deficits  Psychiatric: Normal mood, normal affect  : No Javier  Skin: No rashes  Access: Not applicable    LABS:                Urine Studies:          RADIOLOGY & ADDITIONAL STUDIES:             [facility] [phone]        Patient seen and examined in bed.  He was the same         MEDICATIONS  (STANDING):  atorvastatin 80 milliGRAM(s) Oral at bedtime  carbidopa/levodopa  25/100 2.5 Tablet(s) Oral <User Schedule>  carbidopa/levodopa  25/100 2 Tablet(s) Oral <User Schedule>  chlorhexidine 4% Liquid 1 Application(s) Topical <User Schedule>  cholecalciferol 1000 Unit(s) Oral daily  cinacalcet 60 milliGRAM(s) Oral daily  dextrose 40% Gel 15 Gram(s) Oral once  dextrose 5%. 1000 milliLiter(s) (50 mL/Hr) IV Continuous <Continuous>  dextrose 5%. 1000 milliLiter(s) (100 mL/Hr) IV Continuous <Continuous>  dextrose 50% Injectable 25 Gram(s) IV Push once  dextrose 50% Injectable 12.5 Gram(s) IV Push once  dextrose 50% Injectable 25 Gram(s) IV Push once  diVALproex Sprinkle 250 milliGRAM(s) Oral three times a day  DULoxetine 20 milliGRAM(s) Oral daily  folic acid 1 milliGRAM(s) Oral daily  glucagon  Injectable 1 milliGRAM(s) IntraMuscular once  insulin lispro (ADMELOG) corrective regimen sliding scale   SubCutaneous three times a day before meals  insulin lispro (ADMELOG) corrective regimen sliding scale   SubCutaneous at bedtime  levothyroxine 50 MICROGram(s) Oral daily  memantine 5 milliGRAM(s) Oral at bedtime  metoprolol tartrate 50 milliGRAM(s) Oral two times a day  midodrine 10 milliGRAM(s) Oral <User Schedule>  mirtazapine 7.5 milliGRAM(s) Oral daily  Nephro-celeste 1 Tablet(s) Oral daily  polyethylene glycol 3350 17 Gram(s) Oral two times a day  QUEtiapine 25 milliGRAM(s) Oral three times a day  senna Syrup 10 milliLiter(s) Oral at bedtime  trihexyphenidyl 2 milliGRAM(s) Oral three times a day      VITAL:  T(C): , Max: 36.9 (12-29-21 @ 10:35)  T(F): , Max: 98.4 (12-29-21 @ 10:35)  HR: 79 (12-30-21 @ 04:33)  BP: 122/81 (12-30-21 @ 04:33)  BP(mean): --  RR: 18 (12-30-21 @ 04:33)  SpO2: 97% (12-30-21 @ 04:33)  Wt(kg): --    I and O's:    12-29 @ 07:01  -  12-30 @ 07:00  --------------------------------------------------------  IN: 110 mL / OUT: 0 mL / NET: 110 mL          PHYSICAL EXAM:    Constitutional: NAD  Neck:  No JVD  Respiratory: CTAB/L  Cardiovascular: S1 and S2  Gastrointestinal: BS+, soft, NT/ND  Extremities: No peripheral edema  Neurological:   no focal deficits  Psychiatric: Normal mood, normal affect  : No Javier  Skin: No rashes  Access: avf and perm cath     LABS:                Urine Studies:          RADIOLOGY & ADDITIONAL STUDIES:

## 2021-12-31 NOTE — PROGRESS NOTE ADULT - ASSESSMENT
Assessment  DMT2: 71y Male with DM T2 with hyperglycemia, A1C 6.4%, was on insulin at home, now on insulin coverage only, blood sugars are stable and trending within acceptable range, no hypoglycemias, breathing comfortable on room air, holding off on remdesevir/dexa for now, not eating much.  Hypothyroidism: Patient has no history thyroid disease, was not on any thyroid supplements, subclinical with low-normal FT4, lethargic, started on synthroid 25 mcg po daily, repeat FT4 trending down, increased to synthroid  50 mcg po daily, FT4 improving.  Hypercalcemia: Started on Sensipar 60mg daily, Ca fluctuating/remains elevated..  CHF: on medications, stable, monitored.  HTN: Controlled,  on antihypertensive medications.  Parkinsons: on meds, monitored.  CKD: Monitor labs/BMP      Dr Ayers  Cell : [phone]

## 2021-12-31 NOTE — PROGRESS NOTE ADULT - SUBJECTIVE AND OBJECTIVE BOX
Patient is a 71y old  Male who presents with a chief complaint of leg swelling (31 Dec 2021 11:53)      INTERVAL HPI/OVERNIGHT EVENTS: noted  pt seen and examined this am  no new events  mittens on  calm and cooperative  sating well on RA      Vital Signs Last 24 Hrs  T(C): 36.7 (31 Dec 2021 11:31), Max: 36.7 (30 Dec 2021 22:40)  T(F): 98 (31 Dec 2021 11:31), Max: 98 (30 Dec 2021 22:40)  HR: 94 (31 Dec 2021 11:31) (78 - 120)  BP: 104/85 (31 Dec 2021 11:31) (104/85 - 127/85)  BP(mean): --  RR: 17 (31 Dec 2021 11:31) (17 - 18)  SpO2: 94% (31 Dec 2021 11:31) (94% - 100%)    acetaminophen     Tablet .. 650 milliGRAM(s) Oral every 6 hours PRN  albuterol/ipratropium for Nebulization 3 milliLiter(s) Nebulizer every 6 hours PRN  atorvastatin 80 milliGRAM(s) Oral at bedtime  carbidopa/levodopa  25/100 2.5 Tablet(s) Oral <User Schedule>  carbidopa/levodopa  25/100 2 Tablet(s) Oral <User Schedule>  chlorhexidine 4% Liquid 1 Application(s) Topical <User Schedule>  cholecalciferol 1000 Unit(s) Oral daily  cinacalcet 60 milliGRAM(s) Oral daily  dextrose 40% Gel 15 Gram(s) Oral once  dextrose 5%. 1000 milliLiter(s) IV Continuous <Continuous>  dextrose 5%. 1000 milliLiter(s) IV Continuous <Continuous>  dextrose 50% Injectable 25 Gram(s) IV Push once  dextrose 50% Injectable 12.5 Gram(s) IV Push once  dextrose 50% Injectable 25 Gram(s) IV Push once  diVALproex Sprinkle 125 milliGRAM(s) Oral every 8 hours PRN  diVALproex Sprinkle 250 milliGRAM(s) Oral three times a day  DULoxetine 20 milliGRAM(s) Oral daily  folic acid 1 milliGRAM(s) Oral daily  glucagon  Injectable 1 milliGRAM(s) IntraMuscular once  guaiFENesin Oral Liquid (Sugar-Free) 200 milliGRAM(s) Oral every 6 hours PRN  heparin   Injectable 5000 Unit(s) SubCutaneous every 12 hours  insulin lispro (ADMELOG) corrective regimen sliding scale   SubCutaneous three times a day before meals  insulin lispro (ADMELOG) corrective regimen sliding scale   SubCutaneous at bedtime  levothyroxine 50 MICROGram(s) Oral daily  memantine 5 milliGRAM(s) Oral at bedtime  metoprolol tartrate 50 milliGRAM(s) Oral two times a day  midodrine 10 milliGRAM(s) Oral <User Schedule>  mirtazapine 7.5 milliGRAM(s) Oral daily  Nephro-celeste 1 Tablet(s) Oral daily  ondansetron Injectable 4 milliGRAM(s) IV Push once PRN  polyethylene glycol 3350 17 Gram(s) Oral two times a day  QUEtiapine 12.5 milliGRAM(s) Oral every 6 hours PRN  QUEtiapine 25 milliGRAM(s) Oral three times a day  senna Syrup 10 milliLiter(s) Oral at bedtime  sodium chloride 0.9% lock flush 10 milliLiter(s) IV Push every 1 hour PRN  trihexyphenidyl 2 milliGRAM(s) Oral three times a day      PHYSICAL EXAM:  GENERAL: NAD,   EYES: conjunctiva and sclera clear  ENMT: Moist mucous membranes  NECK: Supple, No JVD, Normal thyroid  CHEST/LUNG: non labored, cta b/l  HEART: Regular rate and rhythm; No murmurs, rubs, or gallops  ABDOMEN: Soft, Nontender, Nondistended; Bowel sounds present  EXTREMITIES:  2+ Peripheral Pulses, No clubbing, cyanosis, or edema  LYMPH: No lymphadenopathy noted  SKIN: No rashes or lesions    Consultant(s) Notes Reviewed:  [x ] YES  [ ] NO  Care Discussed with Consultants/Other Providers [ x] YES  [ ] NO    LABS:                        10.4   8.80  )-----------( 122      ( 31 Dec 2021 05:17 )             34.9     12-31    138  |  100  |  24<H>  ----------------------------<  93  4.5   |  26  |  3.26<H>    Ca    9.8      31 Dec 2021 05:16  Phos  3.9     12-31  Mg     2.1     12-31    TPro  8.3  /  Alb  2.4<L>  /  TBili  0.6  /  DBili  x   /  AST  28  /  ALT  <5<L>  /  AlkPhos  88  12-31        CAPILLARY BLOOD GLUCOSE      POCT Blood Glucose.: 145 mg/dL (31 Dec 2021 11:52)  POCT Blood Glucose.: 96 mg/dL (31 Dec 2021 08:21)  POCT Blood Glucose.: 86 mg/dL (30 Dec 2021 22:30)  POCT Blood Glucose.: 100 mg/dL (30 Dec 2021 17:16)              RADIOLOGY & ADDITIONAL TESTS:    Imaging Personally Reviewed:  [x ] YES  [ ] NO

## 2021-12-31 NOTE — PROGRESS NOTE ADULT - ASSESSMENT
72yo M w/ PMHx of CAD (s/p CABG 2019), CKD (unknown stage), DM2, Parkinson's Disease, HTN, depression presents with new onset acute heart failure exacerbation, NSTEMI, started on hep gtt, developed bl RP bleed, acute anemia. sp MICU course for hemorrhagic shock, 10/28 sp IR embolization l4 and l5 lumbar artery. sp permacath and AVF.    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 related paranoia and agitation    # DM2 (diabetes mellitus, type 2)  per endo  hba1c 6.4    # CAD (coronary artery disease)  # HTN  lopressor, atorvastatin  asa on hold    # FTT  no improvement on remeron, cont to monitor, calorie count  son HCP is interested in artificial nutrition if necessary, GI consult  cont supplement shakes  palliative reconsult to address nutrition goc  ?PEG placement     # surveillance COVID  positive 12/29  monitor o2 sats- sating weell on RA  will hold off on remedesivir or decadron for now    DVT ppx  venodynes  (Given increase risk factors, fall risk, extensive RP bleed, not a candidate for full AC for a.flutter.   However, may consider low dose Hep SQ BID for DVT ppx in the future if hgb remain stable)    PCP Dr. Simons    DNR/DNI    [facility]  [phone]

## 2021-12-31 NOTE — PROGRESS NOTE ADULT - SUBJECTIVE AND OBJECTIVE BOX
Chief complaint  Patient is a 71y old  Male who presents with a chief complaint of leg swelling (30 Dec 2021 15:46)   Review of systems  Patient in bed, NAD, no hypoglycemic episodes.    Labs and Fingersticks  CAPILLARY BLOOD GLUCOSE      POCT Blood Glucose.: 145 mg/dL (31 Dec 2021 11:52)  POCT Blood Glucose.: 96 mg/dL (31 Dec 2021 08:21)  POCT Blood Glucose.: 86 mg/dL (30 Dec 2021 22:30)  POCT Blood Glucose.: 100 mg/dL (30 Dec 2021 17:16)      Anion Gap, Serum: 12 (12-31 @ 05:16)      Calcium, Total Serum: 9.8 (12-31 @ 05:16)  Albumin, Serum: 2.4 *L* (12-31 @ 05:16)    Alanine Aminotransferase (ALT/SGPT): <5 *L* (12-31 @ 05:16)  Alkaline Phosphatase, Serum: 88 (12-31 @ 05:16)  Aspartate Aminotransferase (AST/SGOT): 28 (12-31 @ 05:16)        12-31    138  |  100  |  24<H>  ----------------------------<  93  4.5   |  26  |  3.26<H>    Ca    9.8      31 Dec 2021 05:16  Phos  3.9     12-31  Mg     2.1     12-31    TPro  8.3  /  Alb  2.4<L>  /  TBili  0.6  /  DBili  x   /  AST  28  /  ALT  <5<L>  /  AlkPhos  88  12-31                        10.4   8.80  )-----------( 122      ( 31 Dec 2021 05:17 )             34.9     Medications  MEDICATIONS  (STANDING):  atorvastatin 80 milliGRAM(s) Oral at bedtime  carbidopa/levodopa  25/100 2.5 Tablet(s) Oral <User Schedule>  carbidopa/levodopa  25/100 2 Tablet(s) Oral <User Schedule>  chlorhexidine 4% Liquid 1 Application(s) Topical <User Schedule>  cholecalciferol 1000 Unit(s) Oral daily  cinacalcet 60 milliGRAM(s) Oral daily  dextrose 40% Gel 15 Gram(s) Oral once  dextrose 5%. 1000 milliLiter(s) (50 mL/Hr) IV Continuous <Continuous>  dextrose 5%. 1000 milliLiter(s) (100 mL/Hr) IV Continuous <Continuous>  dextrose 50% Injectable 25 Gram(s) IV Push once  dextrose 50% Injectable 12.5 Gram(s) IV Push once  dextrose 50% Injectable 25 Gram(s) IV Push once  diVALproex Sprinkle 250 milliGRAM(s) Oral three times a day  DULoxetine 20 milliGRAM(s) Oral daily  folic acid 1 milliGRAM(s) Oral daily  glucagon  Injectable 1 milliGRAM(s) IntraMuscular once  heparin   Injectable 5000 Unit(s) SubCutaneous every 12 hours  insulin lispro (ADMELOG) corrective regimen sliding scale   SubCutaneous three times a day before meals  insulin lispro (ADMELOG) corrective regimen sliding scale   SubCutaneous at bedtime  levothyroxine 50 MICROGram(s) Oral daily  memantine 5 milliGRAM(s) Oral at bedtime  metoprolol tartrate 50 milliGRAM(s) Oral two times a day  midodrine 10 milliGRAM(s) Oral <User Schedule>  mirtazapine 7.5 milliGRAM(s) Oral daily  Nephro-celeste 1 Tablet(s) Oral daily  polyethylene glycol 3350 17 Gram(s) Oral two times a day  QUEtiapine 25 milliGRAM(s) Oral three times a day  senna Syrup 10 milliLiter(s) Oral at bedtime  trihexyphenidyl 2 milliGRAM(s) Oral three times a day      Physical Exam  General: Patient comfortable in bed  Vital Signs Last 12 Hrs  T(F): 98 (12-31-21 @ 11:31), Max: 98 (12-31-21 @ 11:31)  HR: 94 (12-31-21 @ 11:31) (90 - 94)  BP: 104/85 (12-31-21 @ 11:31) (104/85 - 111/60)  BP(mean): --  RR: 17 (12-31-21 @ 11:31) (17 - 17)  SpO2: 94% (12-31-21 @ 11:31) (94% - 98%)    Diagnostics    Free Thyroxine, Serum: AM Sched. Collection: 16-Dec-2021 06:00 (12-15 @ 14:39)  Free Thyroxine, Serum: AM Sched. Collection: 08-Dec-2021 06:00 (12-07 @ 14:26)

## 2022-01-01 VITALS
HEART RATE: 109 BPM | OXYGEN SATURATION: 100 % | DIASTOLIC BLOOD PRESSURE: 63 MMHG | TEMPERATURE: 98 F | RESPIRATION RATE: 20 BRPM | SYSTOLIC BLOOD PRESSURE: 99 MMHG

## 2022-01-01 DIAGNOSIS — R53.2 FUNCTIONAL QUADRIPLEGIA: ICD-10-CM

## 2022-01-01 DIAGNOSIS — K59.00 CONSTIPATION, UNSPECIFIED: ICD-10-CM

## 2022-01-01 DIAGNOSIS — R06.00 DYSPNEA, UNSPECIFIED: ICD-10-CM

## 2022-01-01 DIAGNOSIS — Z93.1 GASTROSTOMY STATUS: ICD-10-CM

## 2022-01-01 DIAGNOSIS — Z91.89 OTHER SPECIFIED PERSONAL RISK FACTORS, NOT ELSEWHERE CLASSIFIED: ICD-10-CM

## 2022-01-01 DIAGNOSIS — R45.1 RESTLESSNESS AND AGITATION: ICD-10-CM

## 2022-01-01 DIAGNOSIS — R41.0 DISORIENTATION, UNSPECIFIED: ICD-10-CM

## 2022-01-01 DIAGNOSIS — R09.89 OTHER SPECIFIED SYMPTOMS AND SIGNS INVOLVING THE CIRCULATORY AND RESPIRATORY SYSTEMS: ICD-10-CM

## 2022-01-01 DIAGNOSIS — F41.9 ANXIETY DISORDER, UNSPECIFIED: ICD-10-CM

## 2022-01-01 DIAGNOSIS — I95.9 HYPOTENSION, UNSPECIFIED: ICD-10-CM

## 2022-01-01 DIAGNOSIS — Z01.810 ENCOUNTER FOR PREPROCEDURAL CARDIOVASCULAR EXAMINATION: ICD-10-CM

## 2022-01-01 LAB
-  AMIKACIN: SIGNIFICANT CHANGE UP
-  AMOXICILLIN/CLAVULANIC ACID: SIGNIFICANT CHANGE UP
-  AMPICILLIN/SULBACTAM: SIGNIFICANT CHANGE UP
-  AMPICILLIN: SIGNIFICANT CHANGE UP
-  AZTREONAM: SIGNIFICANT CHANGE UP
-  CEFAZOLIN: SIGNIFICANT CHANGE UP
-  CEFEPIME: SIGNIFICANT CHANGE UP
-  CEFOXITIN: SIGNIFICANT CHANGE UP
-  CEFTRIAXONE: SIGNIFICANT CHANGE UP
-  CIPROFLOXACIN: SIGNIFICANT CHANGE UP
-  ERTAPENEM: SIGNIFICANT CHANGE UP
-  GENTAMICIN: SIGNIFICANT CHANGE UP
-  IMIPENEM: SIGNIFICANT CHANGE UP
-  LEVOFLOXACIN: SIGNIFICANT CHANGE UP
-  MEROPENEM: SIGNIFICANT CHANGE UP
-  NITROFURANTOIN: SIGNIFICANT CHANGE UP
-  PIPERACILLIN/TAZOBACTAM: SIGNIFICANT CHANGE UP
-  TIGECYCLINE: SIGNIFICANT CHANGE UP
-  TOBRAMYCIN: SIGNIFICANT CHANGE UP
-  TRIMETHOPRIM/SULFAMETHOXAZOLE: SIGNIFICANT CHANGE UP
24R-OH-CALCIDIOL SERPL-MCNC: 47.1 NG/ML — SIGNIFICANT CHANGE UP (ref 30–80)
A1C WITH ESTIMATED AVERAGE GLUCOSE RESULT: 5.8 % — HIGH (ref 4–5.6)
ALBUMIN SERPL ELPH-MCNC: 0.9 G/DL — LOW (ref 3.3–5)
ALBUMIN SERPL ELPH-MCNC: 1.4 G/DL — LOW (ref 3.3–5)
ALBUMIN SERPL ELPH-MCNC: 1.7 G/DL — LOW (ref 3.3–5)
ALBUMIN SERPL ELPH-MCNC: 1.8 G/DL — LOW (ref 3.3–5)
ALBUMIN SERPL ELPH-MCNC: 1.9 G/DL — LOW (ref 3.3–5)
ALBUMIN SERPL ELPH-MCNC: 2 G/DL — LOW (ref 3.3–5)
ALBUMIN SERPL ELPH-MCNC: 2.3 G/DL — LOW (ref 3.3–5)
ALBUMIN SERPL ELPH-MCNC: 2.3 G/DL — LOW (ref 3.3–5)
ALBUMIN SERPL ELPH-MCNC: 2.5 G/DL — LOW (ref 3.3–5)
ALP SERPL-CCNC: 108 U/L — SIGNIFICANT CHANGE UP (ref 40–120)
ALP SERPL-CCNC: 135 U/L — HIGH (ref 40–120)
ALP SERPL-CCNC: 36 U/L — LOW (ref 40–120)
ALP SERPL-CCNC: 60 U/L — SIGNIFICANT CHANGE UP (ref 40–120)
ALP SERPL-CCNC: 65 U/L — SIGNIFICANT CHANGE UP (ref 40–120)
ALP SERPL-CCNC: 67 U/L — SIGNIFICANT CHANGE UP (ref 40–120)
ALP SERPL-CCNC: 68 U/L — SIGNIFICANT CHANGE UP (ref 40–120)
ALP SERPL-CCNC: 70 U/L — SIGNIFICANT CHANGE UP (ref 40–120)
ALP SERPL-CCNC: 76 U/L — SIGNIFICANT CHANGE UP (ref 40–120)
ALP SERPL-CCNC: 78 U/L — SIGNIFICANT CHANGE UP (ref 40–120)
ALP SERPL-CCNC: 81 U/L — SIGNIFICANT CHANGE UP (ref 40–120)
ALP SERPL-CCNC: 81 U/L — SIGNIFICANT CHANGE UP (ref 40–120)
ALP SERPL-CCNC: 83 U/L — SIGNIFICANT CHANGE UP (ref 40–120)
ALP SERPL-CCNC: 88 U/L — SIGNIFICANT CHANGE UP (ref 40–120)
ALP SERPL-CCNC: 92 U/L — SIGNIFICANT CHANGE UP (ref 40–120)
ALT FLD-CCNC: <5 U/L — LOW (ref 10–45)
ANION GAP SERPL CALC-SCNC: 10 MMOL/L — SIGNIFICANT CHANGE UP (ref 5–17)
ANION GAP SERPL CALC-SCNC: 11 MMOL/L — SIGNIFICANT CHANGE UP (ref 5–17)
ANION GAP SERPL CALC-SCNC: 12 MMOL/L — SIGNIFICANT CHANGE UP (ref 5–17)
ANION GAP SERPL CALC-SCNC: 13 MMOL/L — SIGNIFICANT CHANGE UP (ref 5–17)
ANION GAP SERPL CALC-SCNC: 14 MMOL/L — SIGNIFICANT CHANGE UP (ref 5–17)
ANION GAP SERPL CALC-SCNC: 15 MMOL/L — SIGNIFICANT CHANGE UP (ref 5–17)
ANION GAP SERPL CALC-SCNC: 16 MMOL/L — SIGNIFICANT CHANGE UP (ref 5–17)
ANION GAP SERPL CALC-SCNC: 17 MMOL/L — SIGNIFICANT CHANGE UP (ref 5–17)
ANION GAP SERPL CALC-SCNC: 17 MMOL/L — SIGNIFICANT CHANGE UP (ref 5–17)
ANION GAP SERPL CALC-SCNC: 18 MMOL/L — HIGH (ref 5–17)
ANION GAP SERPL CALC-SCNC: 8 MMOL/L — SIGNIFICANT CHANGE UP (ref 5–17)
ANION GAP SERPL CALC-SCNC: 9 MMOL/L — SIGNIFICANT CHANGE UP (ref 5–17)
APPEARANCE UR: ABNORMAL
APPEARANCE UR: ABNORMAL
APPEARANCE UR: CLEAR — SIGNIFICANT CHANGE UP
APTT BLD: 35.4 SEC — SIGNIFICANT CHANGE UP (ref 27.5–35.5)
APTT BLD: 37.5 SEC — HIGH (ref 27.5–35.5)
AST SERPL-CCNC: 16 U/L — SIGNIFICANT CHANGE UP (ref 10–40)
AST SERPL-CCNC: 16 U/L — SIGNIFICANT CHANGE UP (ref 10–40)
AST SERPL-CCNC: 17 U/L — SIGNIFICANT CHANGE UP (ref 10–40)
AST SERPL-CCNC: 17 U/L — SIGNIFICANT CHANGE UP (ref 10–40)
AST SERPL-CCNC: 18 U/L — SIGNIFICANT CHANGE UP (ref 10–40)
AST SERPL-CCNC: 20 U/L — SIGNIFICANT CHANGE UP (ref 10–40)
AST SERPL-CCNC: 20 U/L — SIGNIFICANT CHANGE UP (ref 10–40)
AST SERPL-CCNC: 21 U/L — SIGNIFICANT CHANGE UP (ref 10–40)
AST SERPL-CCNC: 21 U/L — SIGNIFICANT CHANGE UP (ref 10–40)
AST SERPL-CCNC: 23 U/L — SIGNIFICANT CHANGE UP (ref 10–40)
AST SERPL-CCNC: 23 U/L — SIGNIFICANT CHANGE UP (ref 10–40)
AST SERPL-CCNC: 25 U/L — SIGNIFICANT CHANGE UP (ref 10–40)
AST SERPL-CCNC: 26 U/L — SIGNIFICANT CHANGE UP (ref 10–40)
AST SERPL-CCNC: 31 U/L — SIGNIFICANT CHANGE UP (ref 10–40)
AST SERPL-CCNC: 37 U/L — SIGNIFICANT CHANGE UP (ref 10–40)
BACTERIA # UR AUTO: NEGATIVE — SIGNIFICANT CHANGE UP
BACTERIA # UR AUTO: NEGATIVE — SIGNIFICANT CHANGE UP
BASE EXCESS BLDV CALC-SCNC: -0.1 MMOL/L — SIGNIFICANT CHANGE UP (ref -2–2)
BASE EXCESS BLDV CALC-SCNC: -0.7 MMOL/L — SIGNIFICANT CHANGE UP (ref -2–2)
BASOPHILS # BLD AUTO: 0.03 K/UL — SIGNIFICANT CHANGE UP (ref 0–0.2)
BASOPHILS # BLD AUTO: 0.06 K/UL — SIGNIFICANT CHANGE UP (ref 0–0.2)
BASOPHILS # BLD AUTO: 0.07 K/UL — SIGNIFICANT CHANGE UP (ref 0–0.2)
BASOPHILS NFR BLD AUTO: 0.4 % — SIGNIFICANT CHANGE UP (ref 0–2)
BASOPHILS NFR BLD AUTO: 0.9 % — SIGNIFICANT CHANGE UP (ref 0–2)
BASOPHILS NFR BLD AUTO: 0.9 % — SIGNIFICANT CHANGE UP (ref 0–2)
BILIRUB DIRECT SERPL-MCNC: 0.2 MG/DL — SIGNIFICANT CHANGE UP (ref 0–0.3)
BILIRUB INDIRECT FLD-MCNC: 0.4 MG/DL — SIGNIFICANT CHANGE UP (ref 0.2–1)
BILIRUB SERPL-MCNC: 0.2 MG/DL — SIGNIFICANT CHANGE UP (ref 0.2–1.2)
BILIRUB SERPL-MCNC: 0.3 MG/DL — SIGNIFICANT CHANGE UP (ref 0.2–1.2)
BILIRUB SERPL-MCNC: 0.4 MG/DL — SIGNIFICANT CHANGE UP (ref 0.2–1.2)
BILIRUB SERPL-MCNC: 0.5 MG/DL — SIGNIFICANT CHANGE UP (ref 0.2–1.2)
BILIRUB SERPL-MCNC: 0.5 MG/DL — SIGNIFICANT CHANGE UP (ref 0.2–1.2)
BILIRUB SERPL-MCNC: 0.6 MG/DL — SIGNIFICANT CHANGE UP (ref 0.2–1.2)
BILIRUB SERPL-MCNC: 0.7 MG/DL — SIGNIFICANT CHANGE UP (ref 0.2–1.2)
BILIRUB UR-MCNC: NEGATIVE — SIGNIFICANT CHANGE UP
BLD GP AB SCN SERPL QL: NEGATIVE — SIGNIFICANT CHANGE UP
BLOOD GAS VENOUS - CREATININE: SIGNIFICANT CHANGE UP MG/DL (ref 0.5–1.3)
BUN SERPL-MCNC: 102 MG/DL — HIGH (ref 7–23)
BUN SERPL-MCNC: 104 MG/DL — HIGH (ref 7–23)
BUN SERPL-MCNC: 14 MG/DL — SIGNIFICANT CHANGE UP (ref 7–23)
BUN SERPL-MCNC: 21 MG/DL — SIGNIFICANT CHANGE UP (ref 7–23)
BUN SERPL-MCNC: 23 MG/DL — SIGNIFICANT CHANGE UP (ref 7–23)
BUN SERPL-MCNC: 26 MG/DL — HIGH (ref 7–23)
BUN SERPL-MCNC: 29 MG/DL — HIGH (ref 7–23)
BUN SERPL-MCNC: 30 MG/DL — HIGH (ref 7–23)
BUN SERPL-MCNC: 31 MG/DL — HIGH (ref 7–23)
BUN SERPL-MCNC: 32 MG/DL — HIGH (ref 7–23)
BUN SERPL-MCNC: 34 MG/DL — HIGH (ref 7–23)
BUN SERPL-MCNC: 34 MG/DL — HIGH (ref 7–23)
BUN SERPL-MCNC: 37 MG/DL — HIGH (ref 7–23)
BUN SERPL-MCNC: 38 MG/DL — HIGH (ref 7–23)
BUN SERPL-MCNC: 39 MG/DL — HIGH (ref 7–23)
BUN SERPL-MCNC: 41 MG/DL — HIGH (ref 7–23)
BUN SERPL-MCNC: 43 MG/DL — HIGH (ref 7–23)
BUN SERPL-MCNC: 46 MG/DL — HIGH (ref 7–23)
BUN SERPL-MCNC: 47 MG/DL — HIGH (ref 7–23)
BUN SERPL-MCNC: 48 MG/DL — HIGH (ref 7–23)
BUN SERPL-MCNC: 49 MG/DL — HIGH (ref 7–23)
BUN SERPL-MCNC: 49 MG/DL — HIGH (ref 7–23)
BUN SERPL-MCNC: 50 MG/DL — HIGH (ref 7–23)
BUN SERPL-MCNC: 50 MG/DL — HIGH (ref 7–23)
BUN SERPL-MCNC: 51 MG/DL — HIGH (ref 7–23)
BUN SERPL-MCNC: 52 MG/DL — HIGH (ref 7–23)
BUN SERPL-MCNC: 54 MG/DL — HIGH (ref 7–23)
BUN SERPL-MCNC: 54 MG/DL — HIGH (ref 7–23)
BUN SERPL-MCNC: 57 MG/DL — HIGH (ref 7–23)
BUN SERPL-MCNC: 59 MG/DL — HIGH (ref 7–23)
BUN SERPL-MCNC: 60 MG/DL — HIGH (ref 7–23)
BUN SERPL-MCNC: 61 MG/DL — HIGH (ref 7–23)
BUN SERPL-MCNC: 62 MG/DL — HIGH (ref 7–23)
BUN SERPL-MCNC: 67 MG/DL — HIGH (ref 7–23)
BUN SERPL-MCNC: 68 MG/DL — HIGH (ref 7–23)
BUN SERPL-MCNC: 68 MG/DL — HIGH (ref 7–23)
BUN SERPL-MCNC: 70 MG/DL — HIGH (ref 7–23)
BUN SERPL-MCNC: 74 MG/DL — HIGH (ref 7–23)
BUN SERPL-MCNC: 75 MG/DL — HIGH (ref 7–23)
BUN SERPL-MCNC: 81 MG/DL — HIGH (ref 7–23)
BUN SERPL-MCNC: 81 MG/DL — HIGH (ref 7–23)
BUN SERPL-MCNC: 83 MG/DL — HIGH (ref 7–23)
BUN SERPL-MCNC: 86 MG/DL — HIGH (ref 7–23)
BUN SERPL-MCNC: 93 MG/DL — HIGH (ref 7–23)
BUN SERPL-MCNC: 94 MG/DL — HIGH (ref 7–23)
BUN SERPL-MCNC: 97 MG/DL — HIGH (ref 7–23)
C DIFF GDH STL QL: SIGNIFICANT CHANGE UP
C DIFF GDH STL QL: SIGNIFICANT CHANGE UP
CA-I SERPL-SCNC: 1.13 MMOL/L — LOW (ref 1.15–1.33)
CA-I SERPL-SCNC: 1.17 MMOL/L — SIGNIFICANT CHANGE UP (ref 1.15–1.33)
CALCIUM SERPL-MCNC: 10 MG/DL — SIGNIFICANT CHANGE UP (ref 8.4–10.5)
CALCIUM SERPL-MCNC: 10.1 MG/DL — SIGNIFICANT CHANGE UP (ref 8.4–10.5)
CALCIUM SERPL-MCNC: 10.2 MG/DL — SIGNIFICANT CHANGE UP (ref 8.4–10.5)
CALCIUM SERPL-MCNC: 10.2 MG/DL — SIGNIFICANT CHANGE UP (ref 8.4–10.5)
CALCIUM SERPL-MCNC: 10.3 MG/DL — SIGNIFICANT CHANGE UP (ref 8.4–10.5)
CALCIUM SERPL-MCNC: 10.4 MG/DL — SIGNIFICANT CHANGE UP (ref 8.4–10.5)
CALCIUM SERPL-MCNC: 10.4 MG/DL — SIGNIFICANT CHANGE UP (ref 8.4–10.5)
CALCIUM SERPL-MCNC: 10.9 MG/DL — HIGH (ref 8.4–10.5)
CALCIUM SERPL-MCNC: 4.5 MG/DL — CRITICAL LOW (ref 8.4–10.5)
CALCIUM SERPL-MCNC: 7.7 MG/DL — LOW (ref 8.4–10.5)
CALCIUM SERPL-MCNC: 7.7 MG/DL — LOW (ref 8.4–10.5)
CALCIUM SERPL-MCNC: 7.8 MG/DL — LOW (ref 8.4–10.5)
CALCIUM SERPL-MCNC: 7.9 MG/DL — LOW (ref 8.4–10.5)
CALCIUM SERPL-MCNC: 8 MG/DL — LOW (ref 8.4–10.5)
CALCIUM SERPL-MCNC: 8 MG/DL — LOW (ref 8.4–10.5)
CALCIUM SERPL-MCNC: 8.2 MG/DL — LOW (ref 8.4–10.5)
CALCIUM SERPL-MCNC: 8.3 MG/DL — LOW (ref 8.4–10.5)
CALCIUM SERPL-MCNC: 8.3 MG/DL — LOW (ref 8.4–10.5)
CALCIUM SERPL-MCNC: 8.4 MG/DL — SIGNIFICANT CHANGE UP (ref 8.4–10.5)
CALCIUM SERPL-MCNC: 8.4 MG/DL — SIGNIFICANT CHANGE UP (ref 8.4–10.5)
CALCIUM SERPL-MCNC: 8.6 MG/DL — SIGNIFICANT CHANGE UP (ref 8.4–10.5)
CALCIUM SERPL-MCNC: 9 MG/DL — SIGNIFICANT CHANGE UP (ref 8.4–10.5)
CALCIUM SERPL-MCNC: 9.1 MG/DL — SIGNIFICANT CHANGE UP (ref 8.4–10.5)
CALCIUM SERPL-MCNC: 9.1 MG/DL — SIGNIFICANT CHANGE UP (ref 8.4–10.5)
CALCIUM SERPL-MCNC: 9.2 MG/DL — SIGNIFICANT CHANGE UP (ref 8.4–10.5)
CALCIUM SERPL-MCNC: 9.3 MG/DL — SIGNIFICANT CHANGE UP (ref 8.4–10.5)
CALCIUM SERPL-MCNC: 9.4 MG/DL — SIGNIFICANT CHANGE UP (ref 8.4–10.5)
CALCIUM SERPL-MCNC: 9.5 MG/DL — SIGNIFICANT CHANGE UP (ref 8.4–10.5)
CALCIUM SERPL-MCNC: 9.5 MG/DL — SIGNIFICANT CHANGE UP (ref 8.4–10.5)
CALCIUM SERPL-MCNC: 9.6 MG/DL — SIGNIFICANT CHANGE UP (ref 8.4–10.5)
CALCIUM SERPL-MCNC: 9.6 MG/DL — SIGNIFICANT CHANGE UP (ref 8.4–10.5)
CALCIUM SERPL-MCNC: 9.7 MG/DL — SIGNIFICANT CHANGE UP (ref 8.4–10.5)
CALCIUM SERPL-MCNC: 9.8 MG/DL — SIGNIFICANT CHANGE UP (ref 8.4–10.5)
CALCIUM SERPL-MCNC: 9.9 MG/DL — SIGNIFICANT CHANGE UP (ref 8.4–10.5)
CHLORIDE BLDV-SCNC: 96 MMOL/L — SIGNIFICANT CHANGE UP (ref 96–108)
CHLORIDE BLDV-SCNC: 96 MMOL/L — SIGNIFICANT CHANGE UP (ref 96–108)
CHLORIDE SERPL-SCNC: 100 MMOL/L — SIGNIFICANT CHANGE UP (ref 96–108)
CHLORIDE SERPL-SCNC: 101 MMOL/L — SIGNIFICANT CHANGE UP (ref 96–108)
CHLORIDE SERPL-SCNC: 102 MMOL/L — SIGNIFICANT CHANGE UP (ref 96–108)
CHLORIDE SERPL-SCNC: 124 MMOL/L — HIGH (ref 96–108)
CHLORIDE SERPL-SCNC: 82 MMOL/L — LOW (ref 96–108)
CHLORIDE SERPL-SCNC: 87 MMOL/L — LOW (ref 96–108)
CHLORIDE SERPL-SCNC: 87 MMOL/L — LOW (ref 96–108)
CHLORIDE SERPL-SCNC: 89 MMOL/L — LOW (ref 96–108)
CHLORIDE SERPL-SCNC: 90 MMOL/L — LOW (ref 96–108)
CHLORIDE SERPL-SCNC: 91 MMOL/L — LOW (ref 96–108)
CHLORIDE SERPL-SCNC: 92 MMOL/L — LOW (ref 96–108)
CHLORIDE SERPL-SCNC: 92 MMOL/L — LOW (ref 96–108)
CHLORIDE SERPL-SCNC: 93 MMOL/L — LOW (ref 96–108)
CHLORIDE SERPL-SCNC: 94 MMOL/L — LOW (ref 96–108)
CHLORIDE SERPL-SCNC: 95 MMOL/L — LOW (ref 96–108)
CHLORIDE SERPL-SCNC: 96 MMOL/L — SIGNIFICANT CHANGE UP (ref 96–108)
CHLORIDE SERPL-SCNC: 97 MMOL/L — SIGNIFICANT CHANGE UP (ref 96–108)
CHLORIDE SERPL-SCNC: 98 MMOL/L — SIGNIFICANT CHANGE UP (ref 96–108)
CHLORIDE SERPL-SCNC: 99 MMOL/L — SIGNIFICANT CHANGE UP (ref 96–108)
CK MB CFR SERPL CALC: 1.8 NG/ML — SIGNIFICANT CHANGE UP (ref 0–6.7)
CK SERPL-CCNC: 11 U/L — LOW (ref 30–200)
CO2 BLDV-SCNC: 26 MMOL/L — SIGNIFICANT CHANGE UP (ref 22–26)
CO2 BLDV-SCNC: 27 MMOL/L — HIGH (ref 22–26)
CO2 SERPL-SCNC: 13 MMOL/L — LOW (ref 22–31)
CO2 SERPL-SCNC: 18 MMOL/L — LOW (ref 22–31)
CO2 SERPL-SCNC: 19 MMOL/L — LOW (ref 22–31)
CO2 SERPL-SCNC: 20 MMOL/L — LOW (ref 22–31)
CO2 SERPL-SCNC: 21 MMOL/L — LOW (ref 22–31)
CO2 SERPL-SCNC: 22 MMOL/L — SIGNIFICANT CHANGE UP (ref 22–31)
CO2 SERPL-SCNC: 23 MMOL/L — SIGNIFICANT CHANGE UP (ref 22–31)
CO2 SERPL-SCNC: 24 MMOL/L — SIGNIFICANT CHANGE UP (ref 22–31)
CO2 SERPL-SCNC: 25 MMOL/L — SIGNIFICANT CHANGE UP (ref 22–31)
CO2 SERPL-SCNC: 26 MMOL/L — SIGNIFICANT CHANGE UP (ref 22–31)
CO2 SERPL-SCNC: 27 MMOL/L — SIGNIFICANT CHANGE UP (ref 22–31)
CO2 SERPL-SCNC: 28 MMOL/L — SIGNIFICANT CHANGE UP (ref 22–31)
COLOR SPEC: SIGNIFICANT CHANGE UP
COLOR SPEC: YELLOW — SIGNIFICANT CHANGE UP
COLOR SPEC: YELLOW — SIGNIFICANT CHANGE UP
CORTIS AM PEAK SERPL-MCNC: 19.8 UG/DL — HIGH (ref 6–18.4)
CREAT SERPL-MCNC: 1.6 MG/DL — HIGH (ref 0.5–1.3)
CREAT SERPL-MCNC: 1.64 MG/DL — HIGH (ref 0.5–1.3)
CREAT SERPL-MCNC: 1.99 MG/DL — HIGH (ref 0.5–1.3)
CREAT SERPL-MCNC: 2.2 MG/DL — HIGH (ref 0.5–1.3)
CREAT SERPL-MCNC: 2.38 MG/DL — HIGH (ref 0.5–1.3)
CREAT SERPL-MCNC: 2.4 MG/DL — HIGH (ref 0.5–1.3)
CREAT SERPL-MCNC: 2.46 MG/DL — HIGH (ref 0.5–1.3)
CREAT SERPL-MCNC: 2.47 MG/DL — HIGH (ref 0.5–1.3)
CREAT SERPL-MCNC: 2.5 MG/DL — HIGH (ref 0.5–1.3)
CREAT SERPL-MCNC: 2.69 MG/DL — HIGH (ref 0.5–1.3)
CREAT SERPL-MCNC: 2.69 MG/DL — HIGH (ref 0.5–1.3)
CREAT SERPL-MCNC: 2.7 MG/DL — HIGH (ref 0.5–1.3)
CREAT SERPL-MCNC: 2.73 MG/DL — HIGH (ref 0.5–1.3)
CREAT SERPL-MCNC: 2.78 MG/DL — HIGH (ref 0.5–1.3)
CREAT SERPL-MCNC: 2.81 MG/DL — HIGH (ref 0.5–1.3)
CREAT SERPL-MCNC: 2.85 MG/DL — HIGH (ref 0.5–1.3)
CREAT SERPL-MCNC: 2.86 MG/DL — HIGH (ref 0.5–1.3)
CREAT SERPL-MCNC: 2.93 MG/DL — HIGH (ref 0.5–1.3)
CREAT SERPL-MCNC: 2.94 MG/DL — HIGH (ref 0.5–1.3)
CREAT SERPL-MCNC: 2.96 MG/DL — HIGH (ref 0.5–1.3)
CREAT SERPL-MCNC: 3.06 MG/DL — HIGH (ref 0.5–1.3)
CREAT SERPL-MCNC: 3.11 MG/DL — HIGH (ref 0.5–1.3)
CREAT SERPL-MCNC: 3.13 MG/DL — HIGH (ref 0.5–1.3)
CREAT SERPL-MCNC: 3.25 MG/DL — HIGH (ref 0.5–1.3)
CREAT SERPL-MCNC: 3.36 MG/DL — HIGH (ref 0.5–1.3)
CREAT SERPL-MCNC: 3.37 MG/DL — HIGH (ref 0.5–1.3)
CREAT SERPL-MCNC: 3.39 MG/DL — HIGH (ref 0.5–1.3)
CREAT SERPL-MCNC: 3.43 MG/DL — HIGH (ref 0.5–1.3)
CREAT SERPL-MCNC: 3.52 MG/DL — HIGH (ref 0.5–1.3)
CREAT SERPL-MCNC: 3.56 MG/DL — HIGH (ref 0.5–1.3)
CREAT SERPL-MCNC: 3.62 MG/DL — HIGH (ref 0.5–1.3)
CREAT SERPL-MCNC: 3.76 MG/DL — HIGH (ref 0.5–1.3)
CREAT SERPL-MCNC: 3.77 MG/DL — HIGH (ref 0.5–1.3)
CREAT SERPL-MCNC: 3.87 MG/DL — HIGH (ref 0.5–1.3)
CREAT SERPL-MCNC: 3.94 MG/DL — HIGH (ref 0.5–1.3)
CREAT SERPL-MCNC: 4.04 MG/DL — HIGH (ref 0.5–1.3)
CREAT SERPL-MCNC: 4.04 MG/DL — HIGH (ref 0.5–1.3)
CREAT SERPL-MCNC: 4.25 MG/DL — HIGH (ref 0.5–1.3)
CREAT SERPL-MCNC: 4.34 MG/DL — HIGH (ref 0.5–1.3)
CREAT SERPL-MCNC: 4.46 MG/DL — HIGH (ref 0.5–1.3)
CREAT SERPL-MCNC: 4.64 MG/DL — HIGH (ref 0.5–1.3)
CREAT SERPL-MCNC: 4.67 MG/DL — HIGH (ref 0.5–1.3)
CREAT SERPL-MCNC: 4.67 MG/DL — HIGH (ref 0.5–1.3)
CREAT SERPL-MCNC: 4.74 MG/DL — HIGH (ref 0.5–1.3)
CREAT SERPL-MCNC: 4.76 MG/DL — HIGH (ref 0.5–1.3)
CREAT SERPL-MCNC: 4.89 MG/DL — HIGH (ref 0.5–1.3)
CREAT SERPL-MCNC: 4.99 MG/DL — HIGH (ref 0.5–1.3)
CREAT SERPL-MCNC: 5.24 MG/DL — HIGH (ref 0.5–1.3)
CREAT SERPL-MCNC: 5.53 MG/DL — HIGH (ref 0.5–1.3)
CREAT SERPL-MCNC: 5.93 MG/DL — HIGH (ref 0.5–1.3)
CULTURE RESULTS: SIGNIFICANT CHANGE UP
D DIMER BLD IA.RAPID-MCNC: 2008 NG/ML DDU — HIGH
DIFF PNL FLD: ABNORMAL
EOSINOPHIL # BLD AUTO: 0.09 K/UL — SIGNIFICANT CHANGE UP (ref 0–0.5)
EOSINOPHIL # BLD AUTO: 0.19 K/UL — SIGNIFICANT CHANGE UP (ref 0–0.5)
EOSINOPHIL # BLD AUTO: 0.23 K/UL — SIGNIFICANT CHANGE UP (ref 0–0.5)
EOSINOPHIL NFR BLD AUTO: 1.1 % — SIGNIFICANT CHANGE UP (ref 0–6)
EOSINOPHIL NFR BLD AUTO: 2.8 % — SIGNIFICANT CHANGE UP (ref 0–6)
EOSINOPHIL NFR BLD AUTO: 3.3 % — SIGNIFICANT CHANGE UP (ref 0–6)
EPI CELLS # UR: 0 /HPF — SIGNIFICANT CHANGE UP
EPI CELLS # UR: 1 /HPF — SIGNIFICANT CHANGE UP
ESTIMATED AVERAGE GLUCOSE: 120 MG/DL — HIGH (ref 68–114)
FERRITIN SERPL-MCNC: 3308 NG/ML — HIGH (ref 30–400)
GAS PNL BLDA: SIGNIFICANT CHANGE UP
GAS PNL BLDV: 129 MMOL/L — LOW (ref 136–145)
GAS PNL BLDV: 129 MMOL/L — LOW (ref 136–145)
GAS PNL BLDV: SIGNIFICANT CHANGE UP
GLUCOSE BLDC GLUCOMTR-MCNC: 100 MG/DL — HIGH (ref 70–99)
GLUCOSE BLDC GLUCOMTR-MCNC: 102 MG/DL — HIGH (ref 70–99)
GLUCOSE BLDC GLUCOMTR-MCNC: 103 MG/DL — HIGH (ref 70–99)
GLUCOSE BLDC GLUCOMTR-MCNC: 103 MG/DL — HIGH (ref 70–99)
GLUCOSE BLDC GLUCOMTR-MCNC: 104 MG/DL — HIGH (ref 70–99)
GLUCOSE BLDC GLUCOMTR-MCNC: 105 MG/DL — HIGH (ref 70–99)
GLUCOSE BLDC GLUCOMTR-MCNC: 105 MG/DL — HIGH (ref 70–99)
GLUCOSE BLDC GLUCOMTR-MCNC: 107 MG/DL — HIGH (ref 70–99)
GLUCOSE BLDC GLUCOMTR-MCNC: 108 MG/DL — HIGH (ref 70–99)
GLUCOSE BLDC GLUCOMTR-MCNC: 108 MG/DL — HIGH (ref 70–99)
GLUCOSE BLDC GLUCOMTR-MCNC: 109 MG/DL — HIGH (ref 70–99)
GLUCOSE BLDC GLUCOMTR-MCNC: 110 MG/DL — HIGH (ref 70–99)
GLUCOSE BLDC GLUCOMTR-MCNC: 111 MG/DL — HIGH (ref 70–99)
GLUCOSE BLDC GLUCOMTR-MCNC: 112 MG/DL — HIGH (ref 70–99)
GLUCOSE BLDC GLUCOMTR-MCNC: 113 MG/DL — HIGH (ref 70–99)
GLUCOSE BLDC GLUCOMTR-MCNC: 113 MG/DL — HIGH (ref 70–99)
GLUCOSE BLDC GLUCOMTR-MCNC: 114 MG/DL — HIGH (ref 70–99)
GLUCOSE BLDC GLUCOMTR-MCNC: 114 MG/DL — HIGH (ref 70–99)
GLUCOSE BLDC GLUCOMTR-MCNC: 115 MG/DL — HIGH (ref 70–99)
GLUCOSE BLDC GLUCOMTR-MCNC: 115 MG/DL — HIGH (ref 70–99)
GLUCOSE BLDC GLUCOMTR-MCNC: 116 MG/DL — HIGH (ref 70–99)
GLUCOSE BLDC GLUCOMTR-MCNC: 117 MG/DL — HIGH (ref 70–99)
GLUCOSE BLDC GLUCOMTR-MCNC: 118 MG/DL — HIGH (ref 70–99)
GLUCOSE BLDC GLUCOMTR-MCNC: 119 MG/DL — HIGH (ref 70–99)
GLUCOSE BLDC GLUCOMTR-MCNC: 120 MG/DL — HIGH (ref 70–99)
GLUCOSE BLDC GLUCOMTR-MCNC: 121 MG/DL — HIGH (ref 70–99)
GLUCOSE BLDC GLUCOMTR-MCNC: 121 MG/DL — HIGH (ref 70–99)
GLUCOSE BLDC GLUCOMTR-MCNC: 122 MG/DL — HIGH (ref 70–99)
GLUCOSE BLDC GLUCOMTR-MCNC: 122 MG/DL — HIGH (ref 70–99)
GLUCOSE BLDC GLUCOMTR-MCNC: 123 MG/DL — HIGH (ref 70–99)
GLUCOSE BLDC GLUCOMTR-MCNC: 124 MG/DL — HIGH (ref 70–99)
GLUCOSE BLDC GLUCOMTR-MCNC: 126 MG/DL — HIGH (ref 70–99)
GLUCOSE BLDC GLUCOMTR-MCNC: 127 MG/DL — HIGH (ref 70–99)
GLUCOSE BLDC GLUCOMTR-MCNC: 127 MG/DL — HIGH (ref 70–99)
GLUCOSE BLDC GLUCOMTR-MCNC: 128 MG/DL — HIGH (ref 70–99)
GLUCOSE BLDC GLUCOMTR-MCNC: 128 MG/DL — HIGH (ref 70–99)
GLUCOSE BLDC GLUCOMTR-MCNC: 129 MG/DL — HIGH (ref 70–99)
GLUCOSE BLDC GLUCOMTR-MCNC: 129 MG/DL — HIGH (ref 70–99)
GLUCOSE BLDC GLUCOMTR-MCNC: 130 MG/DL — HIGH (ref 70–99)
GLUCOSE BLDC GLUCOMTR-MCNC: 131 MG/DL — HIGH (ref 70–99)
GLUCOSE BLDC GLUCOMTR-MCNC: 132 MG/DL — HIGH (ref 70–99)
GLUCOSE BLDC GLUCOMTR-MCNC: 132 MG/DL — HIGH (ref 70–99)
GLUCOSE BLDC GLUCOMTR-MCNC: 133 MG/DL — HIGH (ref 70–99)
GLUCOSE BLDC GLUCOMTR-MCNC: 134 MG/DL — HIGH (ref 70–99)
GLUCOSE BLDC GLUCOMTR-MCNC: 135 MG/DL — HIGH (ref 70–99)
GLUCOSE BLDC GLUCOMTR-MCNC: 136 MG/DL — HIGH (ref 70–99)
GLUCOSE BLDC GLUCOMTR-MCNC: 137 MG/DL — HIGH (ref 70–99)
GLUCOSE BLDC GLUCOMTR-MCNC: 139 MG/DL — HIGH (ref 70–99)
GLUCOSE BLDC GLUCOMTR-MCNC: 140 MG/DL — HIGH (ref 70–99)
GLUCOSE BLDC GLUCOMTR-MCNC: 141 MG/DL — HIGH (ref 70–99)
GLUCOSE BLDC GLUCOMTR-MCNC: 142 MG/DL — HIGH (ref 70–99)
GLUCOSE BLDC GLUCOMTR-MCNC: 143 MG/DL — HIGH (ref 70–99)
GLUCOSE BLDC GLUCOMTR-MCNC: 144 MG/DL — HIGH (ref 70–99)
GLUCOSE BLDC GLUCOMTR-MCNC: 144 MG/DL — HIGH (ref 70–99)
GLUCOSE BLDC GLUCOMTR-MCNC: 145 MG/DL — HIGH (ref 70–99)
GLUCOSE BLDC GLUCOMTR-MCNC: 145 MG/DL — HIGH (ref 70–99)
GLUCOSE BLDC GLUCOMTR-MCNC: 147 MG/DL — HIGH (ref 70–99)
GLUCOSE BLDC GLUCOMTR-MCNC: 148 MG/DL — HIGH (ref 70–99)
GLUCOSE BLDC GLUCOMTR-MCNC: 148 MG/DL — HIGH (ref 70–99)
GLUCOSE BLDC GLUCOMTR-MCNC: 149 MG/DL — HIGH (ref 70–99)
GLUCOSE BLDC GLUCOMTR-MCNC: 153 MG/DL — HIGH (ref 70–99)
GLUCOSE BLDC GLUCOMTR-MCNC: 154 MG/DL — HIGH (ref 70–99)
GLUCOSE BLDC GLUCOMTR-MCNC: 154 MG/DL — HIGH (ref 70–99)
GLUCOSE BLDC GLUCOMTR-MCNC: 156 MG/DL — HIGH (ref 70–99)
GLUCOSE BLDC GLUCOMTR-MCNC: 156 MG/DL — HIGH (ref 70–99)
GLUCOSE BLDC GLUCOMTR-MCNC: 158 MG/DL — HIGH (ref 70–99)
GLUCOSE BLDC GLUCOMTR-MCNC: 158 MG/DL — HIGH (ref 70–99)
GLUCOSE BLDC GLUCOMTR-MCNC: 160 MG/DL — HIGH (ref 70–99)
GLUCOSE BLDC GLUCOMTR-MCNC: 160 MG/DL — HIGH (ref 70–99)
GLUCOSE BLDC GLUCOMTR-MCNC: 161 MG/DL — HIGH (ref 70–99)
GLUCOSE BLDC GLUCOMTR-MCNC: 162 MG/DL — HIGH (ref 70–99)
GLUCOSE BLDC GLUCOMTR-MCNC: 166 MG/DL — HIGH (ref 70–99)
GLUCOSE BLDC GLUCOMTR-MCNC: 166 MG/DL — HIGH (ref 70–99)
GLUCOSE BLDC GLUCOMTR-MCNC: 167 MG/DL — HIGH (ref 70–99)
GLUCOSE BLDC GLUCOMTR-MCNC: 167 MG/DL — HIGH (ref 70–99)
GLUCOSE BLDC GLUCOMTR-MCNC: 168 MG/DL — HIGH (ref 70–99)
GLUCOSE BLDC GLUCOMTR-MCNC: 169 MG/DL — HIGH (ref 70–99)
GLUCOSE BLDC GLUCOMTR-MCNC: 170 MG/DL — HIGH (ref 70–99)
GLUCOSE BLDC GLUCOMTR-MCNC: 171 MG/DL — HIGH (ref 70–99)
GLUCOSE BLDC GLUCOMTR-MCNC: 172 MG/DL — HIGH (ref 70–99)
GLUCOSE BLDC GLUCOMTR-MCNC: 172 MG/DL — HIGH (ref 70–99)
GLUCOSE BLDC GLUCOMTR-MCNC: 174 MG/DL — HIGH (ref 70–99)
GLUCOSE BLDC GLUCOMTR-MCNC: 176 MG/DL — HIGH (ref 70–99)
GLUCOSE BLDC GLUCOMTR-MCNC: 178 MG/DL — HIGH (ref 70–99)
GLUCOSE BLDC GLUCOMTR-MCNC: 179 MG/DL — HIGH (ref 70–99)
GLUCOSE BLDC GLUCOMTR-MCNC: 179 MG/DL — HIGH (ref 70–99)
GLUCOSE BLDC GLUCOMTR-MCNC: 181 MG/DL — HIGH (ref 70–99)
GLUCOSE BLDC GLUCOMTR-MCNC: 181 MG/DL — HIGH (ref 70–99)
GLUCOSE BLDC GLUCOMTR-MCNC: 182 MG/DL — HIGH (ref 70–99)
GLUCOSE BLDC GLUCOMTR-MCNC: 183 MG/DL — HIGH (ref 70–99)
GLUCOSE BLDC GLUCOMTR-MCNC: 183 MG/DL — HIGH (ref 70–99)
GLUCOSE BLDC GLUCOMTR-MCNC: 186 MG/DL — HIGH (ref 70–99)
GLUCOSE BLDC GLUCOMTR-MCNC: 188 MG/DL — HIGH (ref 70–99)
GLUCOSE BLDC GLUCOMTR-MCNC: 190 MG/DL — HIGH (ref 70–99)
GLUCOSE BLDC GLUCOMTR-MCNC: 191 MG/DL — HIGH (ref 70–99)
GLUCOSE BLDC GLUCOMTR-MCNC: 192 MG/DL — HIGH (ref 70–99)
GLUCOSE BLDC GLUCOMTR-MCNC: 192 MG/DL — HIGH (ref 70–99)
GLUCOSE BLDC GLUCOMTR-MCNC: 193 MG/DL — HIGH (ref 70–99)
GLUCOSE BLDC GLUCOMTR-MCNC: 194 MG/DL — HIGH (ref 70–99)
GLUCOSE BLDC GLUCOMTR-MCNC: 195 MG/DL — HIGH (ref 70–99)
GLUCOSE BLDC GLUCOMTR-MCNC: 196 MG/DL — HIGH (ref 70–99)
GLUCOSE BLDC GLUCOMTR-MCNC: 196 MG/DL — HIGH (ref 70–99)
GLUCOSE BLDC GLUCOMTR-MCNC: 197 MG/DL — HIGH (ref 70–99)
GLUCOSE BLDC GLUCOMTR-MCNC: 197 MG/DL — HIGH (ref 70–99)
GLUCOSE BLDC GLUCOMTR-MCNC: 199 MG/DL — HIGH (ref 70–99)
GLUCOSE BLDC GLUCOMTR-MCNC: 200 MG/DL — HIGH (ref 70–99)
GLUCOSE BLDC GLUCOMTR-MCNC: 201 MG/DL — HIGH (ref 70–99)
GLUCOSE BLDC GLUCOMTR-MCNC: 204 MG/DL — HIGH (ref 70–99)
GLUCOSE BLDC GLUCOMTR-MCNC: 205 MG/DL — HIGH (ref 70–99)
GLUCOSE BLDC GLUCOMTR-MCNC: 206 MG/DL — HIGH (ref 70–99)
GLUCOSE BLDC GLUCOMTR-MCNC: 207 MG/DL — HIGH (ref 70–99)
GLUCOSE BLDC GLUCOMTR-MCNC: 208 MG/DL — HIGH (ref 70–99)
GLUCOSE BLDC GLUCOMTR-MCNC: 209 MG/DL — HIGH (ref 70–99)
GLUCOSE BLDC GLUCOMTR-MCNC: 214 MG/DL — HIGH (ref 70–99)
GLUCOSE BLDC GLUCOMTR-MCNC: 225 MG/DL — HIGH (ref 70–99)
GLUCOSE BLDC GLUCOMTR-MCNC: 226 MG/DL — HIGH (ref 70–99)
GLUCOSE BLDC GLUCOMTR-MCNC: 228 MG/DL — HIGH (ref 70–99)
GLUCOSE BLDC GLUCOMTR-MCNC: 231 MG/DL — HIGH (ref 70–99)
GLUCOSE BLDC GLUCOMTR-MCNC: 237 MG/DL — HIGH (ref 70–99)
GLUCOSE BLDC GLUCOMTR-MCNC: 238 MG/DL — HIGH (ref 70–99)
GLUCOSE BLDC GLUCOMTR-MCNC: 263 MG/DL — HIGH (ref 70–99)
GLUCOSE BLDC GLUCOMTR-MCNC: 266 MG/DL — HIGH (ref 70–99)
GLUCOSE BLDC GLUCOMTR-MCNC: 39 MG/DL — CRITICAL LOW (ref 70–99)
GLUCOSE BLDC GLUCOMTR-MCNC: 47 MG/DL — CRITICAL LOW (ref 70–99)
GLUCOSE BLDC GLUCOMTR-MCNC: 70 MG/DL — SIGNIFICANT CHANGE UP (ref 70–99)
GLUCOSE BLDC GLUCOMTR-MCNC: 70 MG/DL — SIGNIFICANT CHANGE UP (ref 70–99)
GLUCOSE BLDC GLUCOMTR-MCNC: 72 MG/DL — SIGNIFICANT CHANGE UP (ref 70–99)
GLUCOSE BLDC GLUCOMTR-MCNC: 72 MG/DL — SIGNIFICANT CHANGE UP (ref 70–99)
GLUCOSE BLDC GLUCOMTR-MCNC: 73 MG/DL — SIGNIFICANT CHANGE UP (ref 70–99)
GLUCOSE BLDC GLUCOMTR-MCNC: 74 MG/DL — SIGNIFICANT CHANGE UP (ref 70–99)
GLUCOSE BLDC GLUCOMTR-MCNC: 75 MG/DL — SIGNIFICANT CHANGE UP (ref 70–99)
GLUCOSE BLDC GLUCOMTR-MCNC: 77 MG/DL — SIGNIFICANT CHANGE UP (ref 70–99)
GLUCOSE BLDC GLUCOMTR-MCNC: 78 MG/DL — SIGNIFICANT CHANGE UP (ref 70–99)
GLUCOSE BLDC GLUCOMTR-MCNC: 80 MG/DL — SIGNIFICANT CHANGE UP (ref 70–99)
GLUCOSE BLDC GLUCOMTR-MCNC: 82 MG/DL — SIGNIFICANT CHANGE UP (ref 70–99)
GLUCOSE BLDC GLUCOMTR-MCNC: 83 MG/DL — SIGNIFICANT CHANGE UP (ref 70–99)
GLUCOSE BLDC GLUCOMTR-MCNC: 83 MG/DL — SIGNIFICANT CHANGE UP (ref 70–99)
GLUCOSE BLDC GLUCOMTR-MCNC: 84 MG/DL — SIGNIFICANT CHANGE UP (ref 70–99)
GLUCOSE BLDC GLUCOMTR-MCNC: 85 MG/DL — SIGNIFICANT CHANGE UP (ref 70–99)
GLUCOSE BLDC GLUCOMTR-MCNC: 85 MG/DL — SIGNIFICANT CHANGE UP (ref 70–99)
GLUCOSE BLDC GLUCOMTR-MCNC: 86 MG/DL — SIGNIFICANT CHANGE UP (ref 70–99)
GLUCOSE BLDC GLUCOMTR-MCNC: 86 MG/DL — SIGNIFICANT CHANGE UP (ref 70–99)
GLUCOSE BLDC GLUCOMTR-MCNC: 88 MG/DL — SIGNIFICANT CHANGE UP (ref 70–99)
GLUCOSE BLDC GLUCOMTR-MCNC: 89 MG/DL — SIGNIFICANT CHANGE UP (ref 70–99)
GLUCOSE BLDC GLUCOMTR-MCNC: 89 MG/DL — SIGNIFICANT CHANGE UP (ref 70–99)
GLUCOSE BLDC GLUCOMTR-MCNC: 90 MG/DL — SIGNIFICANT CHANGE UP (ref 70–99)
GLUCOSE BLDC GLUCOMTR-MCNC: 91 MG/DL — SIGNIFICANT CHANGE UP (ref 70–99)
GLUCOSE BLDC GLUCOMTR-MCNC: 93 MG/DL — SIGNIFICANT CHANGE UP (ref 70–99)
GLUCOSE BLDC GLUCOMTR-MCNC: 93 MG/DL — SIGNIFICANT CHANGE UP (ref 70–99)
GLUCOSE BLDC GLUCOMTR-MCNC: 94 MG/DL — SIGNIFICANT CHANGE UP (ref 70–99)
GLUCOSE BLDC GLUCOMTR-MCNC: 94 MG/DL — SIGNIFICANT CHANGE UP (ref 70–99)
GLUCOSE BLDC GLUCOMTR-MCNC: 95 MG/DL — SIGNIFICANT CHANGE UP (ref 70–99)
GLUCOSE BLDC GLUCOMTR-MCNC: 96 MG/DL — SIGNIFICANT CHANGE UP (ref 70–99)
GLUCOSE BLDC GLUCOMTR-MCNC: 97 MG/DL — SIGNIFICANT CHANGE UP (ref 70–99)
GLUCOSE BLDC GLUCOMTR-MCNC: 98 MG/DL — SIGNIFICANT CHANGE UP (ref 70–99)
GLUCOSE BLDC GLUCOMTR-MCNC: 98 MG/DL — SIGNIFICANT CHANGE UP (ref 70–99)
GLUCOSE BLDC GLUCOMTR-MCNC: 99 MG/DL — SIGNIFICANT CHANGE UP (ref 70–99)
GLUCOSE BLDV-MCNC: 128 MG/DL — HIGH (ref 70–99)
GLUCOSE BLDV-MCNC: 133 MG/DL — HIGH (ref 70–99)
GLUCOSE SERPL-MCNC: 100 MG/DL — HIGH (ref 70–99)
GLUCOSE SERPL-MCNC: 103 MG/DL — HIGH (ref 70–99)
GLUCOSE SERPL-MCNC: 105 MG/DL — HIGH (ref 70–99)
GLUCOSE SERPL-MCNC: 106 MG/DL — HIGH (ref 70–99)
GLUCOSE SERPL-MCNC: 110 MG/DL — HIGH (ref 70–99)
GLUCOSE SERPL-MCNC: 114 MG/DL — HIGH (ref 70–99)
GLUCOSE SERPL-MCNC: 115 MG/DL — HIGH (ref 70–99)
GLUCOSE SERPL-MCNC: 116 MG/DL — HIGH (ref 70–99)
GLUCOSE SERPL-MCNC: 116 MG/DL — HIGH (ref 70–99)
GLUCOSE SERPL-MCNC: 122 MG/DL — HIGH (ref 70–99)
GLUCOSE SERPL-MCNC: 122 MG/DL — HIGH (ref 70–99)
GLUCOSE SERPL-MCNC: 123 MG/DL — HIGH (ref 70–99)
GLUCOSE SERPL-MCNC: 124 MG/DL — HIGH (ref 70–99)
GLUCOSE SERPL-MCNC: 129 MG/DL — HIGH (ref 70–99)
GLUCOSE SERPL-MCNC: 130 MG/DL — HIGH (ref 70–99)
GLUCOSE SERPL-MCNC: 132 MG/DL — HIGH (ref 70–99)
GLUCOSE SERPL-MCNC: 134 MG/DL — HIGH (ref 70–99)
GLUCOSE SERPL-MCNC: 139 MG/DL — HIGH (ref 70–99)
GLUCOSE SERPL-MCNC: 141 MG/DL — HIGH (ref 70–99)
GLUCOSE SERPL-MCNC: 143 MG/DL — HIGH (ref 70–99)
GLUCOSE SERPL-MCNC: 144 MG/DL — HIGH (ref 70–99)
GLUCOSE SERPL-MCNC: 144 MG/DL — HIGH (ref 70–99)
GLUCOSE SERPL-MCNC: 145 MG/DL — HIGH (ref 70–99)
GLUCOSE SERPL-MCNC: 149 MG/DL — HIGH (ref 70–99)
GLUCOSE SERPL-MCNC: 154 MG/DL — HIGH (ref 70–99)
GLUCOSE SERPL-MCNC: 157 MG/DL — HIGH (ref 70–99)
GLUCOSE SERPL-MCNC: 158 MG/DL — HIGH (ref 70–99)
GLUCOSE SERPL-MCNC: 167 MG/DL — HIGH (ref 70–99)
GLUCOSE SERPL-MCNC: 168 MG/DL — HIGH (ref 70–99)
GLUCOSE SERPL-MCNC: 174 MG/DL — HIGH (ref 70–99)
GLUCOSE SERPL-MCNC: 179 MG/DL — HIGH (ref 70–99)
GLUCOSE SERPL-MCNC: 182 MG/DL — HIGH (ref 70–99)
GLUCOSE SERPL-MCNC: 183 MG/DL — HIGH (ref 70–99)
GLUCOSE SERPL-MCNC: 187 MG/DL — HIGH (ref 70–99)
GLUCOSE SERPL-MCNC: 188 MG/DL — HIGH (ref 70–99)
GLUCOSE SERPL-MCNC: 189 MG/DL — HIGH (ref 70–99)
GLUCOSE SERPL-MCNC: 189 MG/DL — HIGH (ref 70–99)
GLUCOSE SERPL-MCNC: 205 MG/DL — HIGH (ref 70–99)
GLUCOSE SERPL-MCNC: 223 MG/DL — HIGH (ref 70–99)
GLUCOSE SERPL-MCNC: 55 MG/DL — LOW (ref 70–99)
GLUCOSE SERPL-MCNC: 75 MG/DL — SIGNIFICANT CHANGE UP (ref 70–99)
GLUCOSE SERPL-MCNC: 798 MG/DL — CRITICAL HIGH (ref 70–99)
GLUCOSE SERPL-MCNC: 80 MG/DL — SIGNIFICANT CHANGE UP (ref 70–99)
GLUCOSE SERPL-MCNC: 82 MG/DL — SIGNIFICANT CHANGE UP (ref 70–99)
GLUCOSE SERPL-MCNC: 87 MG/DL — SIGNIFICANT CHANGE UP (ref 70–99)
GLUCOSE SERPL-MCNC: 88 MG/DL — SIGNIFICANT CHANGE UP (ref 70–99)
GLUCOSE SERPL-MCNC: 90 MG/DL — SIGNIFICANT CHANGE UP (ref 70–99)
GLUCOSE SERPL-MCNC: 91 MG/DL — SIGNIFICANT CHANGE UP (ref 70–99)
GLUCOSE SERPL-MCNC: 92 MG/DL — SIGNIFICANT CHANGE UP (ref 70–99)
GLUCOSE SERPL-MCNC: 94 MG/DL — SIGNIFICANT CHANGE UP (ref 70–99)
GLUCOSE UR QL: NEGATIVE — SIGNIFICANT CHANGE UP
GRAM STN FLD: SIGNIFICANT CHANGE UP
GRAM STN FLD: SIGNIFICANT CHANGE UP
HAV IGM SER-ACNC: SIGNIFICANT CHANGE UP
HBV CORE AB SER-ACNC: SIGNIFICANT CHANGE UP
HBV CORE IGM SER-ACNC: SIGNIFICANT CHANGE UP
HBV SURFACE AB SER-ACNC: <3 MIU/ML — LOW
HBV SURFACE AG SER-ACNC: SIGNIFICANT CHANGE UP
HBV SURFACE AG SER-ACNC: SIGNIFICANT CHANGE UP
HCO3 BLDV-SCNC: 25 MMOL/L — SIGNIFICANT CHANGE UP (ref 22–29)
HCO3 BLDV-SCNC: 26 MMOL/L — SIGNIFICANT CHANGE UP (ref 22–29)
HCT VFR BLD CALC: 15.4 % — CRITICAL LOW (ref 39–50)
HCT VFR BLD CALC: 16.6 % — CRITICAL LOW (ref 39–50)
HCT VFR BLD CALC: 16.9 % — CRITICAL LOW (ref 39–50)
HCT VFR BLD CALC: 18.2 % — CRITICAL LOW (ref 39–50)
HCT VFR BLD CALC: 19.8 % — CRITICAL LOW (ref 39–50)
HCT VFR BLD CALC: 20.8 % — CRITICAL LOW (ref 39–50)
HCT VFR BLD CALC: 21.1 % — LOW (ref 39–50)
HCT VFR BLD CALC: 21.7 % — LOW (ref 39–50)
HCT VFR BLD CALC: 22 % — LOW (ref 39–50)
HCT VFR BLD CALC: 22.9 % — LOW (ref 39–50)
HCT VFR BLD CALC: 23.2 % — LOW (ref 39–50)
HCT VFR BLD CALC: 24.1 % — LOW (ref 39–50)
HCT VFR BLD CALC: 24.2 % — LOW (ref 39–50)
HCT VFR BLD CALC: 25.2 % — LOW (ref 39–50)
HCT VFR BLD CALC: 25.4 % — LOW (ref 39–50)
HCT VFR BLD CALC: 26.4 % — LOW (ref 39–50)
HCT VFR BLD CALC: 26.5 % — LOW (ref 39–50)
HCT VFR BLD CALC: 26.6 % — LOW (ref 39–50)
HCT VFR BLD CALC: 26.6 % — LOW (ref 39–50)
HCT VFR BLD CALC: 26.8 % — LOW (ref 39–50)
HCT VFR BLD CALC: 26.9 % — LOW (ref 39–50)
HCT VFR BLD CALC: 27 % — LOW (ref 39–50)
HCT VFR BLD CALC: 27.3 % — LOW (ref 39–50)
HCT VFR BLD CALC: 27.5 % — LOW (ref 39–50)
HCT VFR BLD CALC: 27.8 % — LOW (ref 39–50)
HCT VFR BLD CALC: 28.3 % — LOW (ref 39–50)
HCT VFR BLD CALC: 28.5 % — LOW (ref 39–50)
HCT VFR BLD CALC: 28.6 % — LOW (ref 39–50)
HCT VFR BLD CALC: 28.8 % — LOW (ref 39–50)
HCT VFR BLD CALC: 28.8 % — LOW (ref 39–50)
HCT VFR BLD CALC: 29 % — LOW (ref 39–50)
HCT VFR BLD CALC: 29.1 % — LOW (ref 39–50)
HCT VFR BLD CALC: 29.4 % — LOW (ref 39–50)
HCT VFR BLD CALC: 29.4 % — LOW (ref 39–50)
HCT VFR BLD CALC: 29.6 % — LOW (ref 39–50)
HCT VFR BLD CALC: 31 % — LOW (ref 39–50)
HCT VFR BLD CALC: 31.1 % — LOW (ref 39–50)
HCT VFR BLD CALC: 31.4 % — LOW (ref 39–50)
HCT VFR BLD CALC: 31.5 % — LOW (ref 39–50)
HCT VFR BLD CALC: 31.9 % — LOW (ref 39–50)
HCT VFR BLD CALC: 32.6 % — LOW (ref 39–50)
HCT VFR BLD CALC: 32.9 % — LOW (ref 39–50)
HCT VFR BLD CALC: 33.8 % — LOW (ref 39–50)
HCT VFR BLD CALC: 34.2 % — LOW (ref 39–50)
HCT VFR BLD CALC: 34.4 % — LOW (ref 39–50)
HCT VFR BLD CALC: 34.7 % — LOW (ref 39–50)
HCT VFR BLD CALC: 36.1 % — LOW (ref 39–50)
HCT VFR BLD CALC: 38.8 % — LOW (ref 39–50)
HCT VFR BLDA CALC: 31 % — LOW (ref 39–51)
HCT VFR BLDA CALC: 34 % — LOW (ref 39–51)
HCV AB S/CO SERPL IA: 0.26 S/CO — SIGNIFICANT CHANGE UP (ref 0–0.99)
HCV AB SERPL-IMP: SIGNIFICANT CHANGE UP
HGB BLD CALC-MCNC: 10.2 G/DL — LOW (ref 12.6–17.4)
HGB BLD CALC-MCNC: 11.2 G/DL — LOW (ref 12.6–17.4)
HGB BLD-MCNC: 10 G/DL — LOW (ref 13–17)
HGB BLD-MCNC: 10 G/DL — LOW (ref 13–17)
HGB BLD-MCNC: 10.1 G/DL — LOW (ref 13–17)
HGB BLD-MCNC: 10.1 G/DL — LOW (ref 13–17)
HGB BLD-MCNC: 10.2 G/DL — LOW (ref 13–17)
HGB BLD-MCNC: 10.5 G/DL — LOW (ref 13–17)
HGB BLD-MCNC: 10.5 G/DL — LOW (ref 13–17)
HGB BLD-MCNC: 11.1 G/DL — LOW (ref 13–17)
HGB BLD-MCNC: 5.1 G/DL — CRITICAL LOW (ref 13–17)
HGB BLD-MCNC: 5.1 G/DL — CRITICAL LOW (ref 13–17)
HGB BLD-MCNC: 5.5 G/DL — CRITICAL LOW (ref 13–17)
HGB BLD-MCNC: 5.9 G/DL — CRITICAL LOW (ref 13–17)
HGB BLD-MCNC: 6 G/DL — CRITICAL LOW (ref 13–17)
HGB BLD-MCNC: 6 G/DL — CRITICAL LOW (ref 13–17)
HGB BLD-MCNC: 6.7 G/DL — CRITICAL LOW (ref 13–17)
HGB BLD-MCNC: 6.8 G/DL — CRITICAL LOW (ref 13–17)
HGB BLD-MCNC: 7.1 G/DL — LOW (ref 13–17)
HGB BLD-MCNC: 7.1 G/DL — LOW (ref 13–17)
HGB BLD-MCNC: 7.2 G/DL — LOW (ref 13–17)
HGB BLD-MCNC: 7.4 G/DL — LOW (ref 13–17)
HGB BLD-MCNC: 7.5 G/DL — LOW (ref 13–17)
HGB BLD-MCNC: 7.8 G/DL — LOW (ref 13–17)
HGB BLD-MCNC: 7.9 G/DL — LOW (ref 13–17)
HGB BLD-MCNC: 7.9 G/DL — LOW (ref 13–17)
HGB BLD-MCNC: 8 G/DL — LOW (ref 13–17)
HGB BLD-MCNC: 8.1 G/DL — LOW (ref 13–17)
HGB BLD-MCNC: 8.4 G/DL — LOW (ref 13–17)
HGB BLD-MCNC: 8.5 G/DL — LOW (ref 13–17)
HGB BLD-MCNC: 8.6 G/DL — LOW (ref 13–17)
HGB BLD-MCNC: 8.6 G/DL — LOW (ref 13–17)
HGB BLD-MCNC: 8.7 G/DL — LOW (ref 13–17)
HGB BLD-MCNC: 8.8 G/DL — LOW (ref 13–17)
HGB BLD-MCNC: 8.9 G/DL — LOW (ref 13–17)
HGB BLD-MCNC: 9 G/DL — LOW (ref 13–17)
HGB BLD-MCNC: 9.2 G/DL — LOW (ref 13–17)
HGB BLD-MCNC: 9.3 G/DL — LOW (ref 13–17)
HGB BLD-MCNC: 9.4 G/DL — LOW (ref 13–17)
HGB BLD-MCNC: 9.5 G/DL — LOW (ref 13–17)
HGB BLD-MCNC: 9.6 G/DL — LOW (ref 13–17)
HGB BLD-MCNC: 9.7 G/DL — LOW (ref 13–17)
HGB BLD-MCNC: 9.8 G/DL — LOW (ref 13–17)
HGB BLD-MCNC: 9.8 G/DL — LOW (ref 13–17)
HGB BLD-MCNC: 9.9 G/DL — LOW (ref 13–17)
HYALINE CASTS # UR AUTO: 0 /LPF — SIGNIFICANT CHANGE UP (ref 0–2)
HYALINE CASTS # UR AUTO: 2 /LPF — SIGNIFICANT CHANGE UP (ref 0–2)
IMM GRANULOCYTES NFR BLD AUTO: 0.3 % — SIGNIFICANT CHANGE UP (ref 0–1.5)
IMM GRANULOCYTES NFR BLD AUTO: 0.3 % — SIGNIFICANT CHANGE UP (ref 0–1.5)
IMM GRANULOCYTES NFR BLD AUTO: 0.5 % — SIGNIFICANT CHANGE UP (ref 0–1.5)
INR BLD: 0.93 RATIO — SIGNIFICANT CHANGE UP (ref 0.88–1.16)
INR BLD: 0.98 RATIO — SIGNIFICANT CHANGE UP (ref 0.88–1.16)
INR BLD: 1.01 RATIO — SIGNIFICANT CHANGE UP (ref 0.88–1.16)
INR BLD: 1.05 RATIO — SIGNIFICANT CHANGE UP (ref 0.88–1.16)
INR BLD: 1.18 RATIO — HIGH (ref 0.88–1.16)
KETONES UR-MCNC: NEGATIVE — SIGNIFICANT CHANGE UP
LACTATE BLDV-MCNC: 1 MMOL/L — SIGNIFICANT CHANGE UP (ref 0.7–2)
LACTATE BLDV-MCNC: 1.2 MMOL/L — SIGNIFICANT CHANGE UP (ref 0.7–2)
LEUKOCYTE ESTERASE UR-ACNC: ABNORMAL
LYMPHOCYTES # BLD AUTO: 1.26 K/UL — SIGNIFICANT CHANGE UP (ref 1–3.3)
LYMPHOCYTES # BLD AUTO: 1.27 K/UL — SIGNIFICANT CHANGE UP (ref 1–3.3)
LYMPHOCYTES # BLD AUTO: 1.57 K/UL — SIGNIFICANT CHANGE UP (ref 1–3.3)
LYMPHOCYTES # BLD AUTO: 18.2 % — SIGNIFICANT CHANGE UP (ref 13–44)
LYMPHOCYTES # BLD AUTO: 18.5 % — SIGNIFICANT CHANGE UP (ref 13–44)
LYMPHOCYTES # BLD AUTO: 19.2 % — SIGNIFICANT CHANGE UP (ref 13–44)
MAGNESIUM SERPL-MCNC: 1.7 MG/DL — SIGNIFICANT CHANGE UP (ref 1.6–2.6)
MAGNESIUM SERPL-MCNC: 1.8 MG/DL — SIGNIFICANT CHANGE UP (ref 1.6–2.6)
MAGNESIUM SERPL-MCNC: 1.8 MG/DL — SIGNIFICANT CHANGE UP (ref 1.6–2.6)
MAGNESIUM SERPL-MCNC: 1.9 MG/DL — SIGNIFICANT CHANGE UP (ref 1.6–2.6)
MAGNESIUM SERPL-MCNC: 2.1 MG/DL — SIGNIFICANT CHANGE UP (ref 1.6–2.6)
MAGNESIUM SERPL-MCNC: 2.2 MG/DL — SIGNIFICANT CHANGE UP (ref 1.6–2.6)
MAGNESIUM SERPL-MCNC: 2.5 MG/DL — SIGNIFICANT CHANGE UP (ref 1.6–2.6)
MANUAL SMEAR VERIFICATION: SIGNIFICANT CHANGE UP
MCHC RBC-ENTMCNC: 28.6 GM/DL — LOW (ref 32–36)
MCHC RBC-ENTMCNC: 28.8 GM/DL — LOW (ref 32–36)
MCHC RBC-ENTMCNC: 29.1 GM/DL — LOW (ref 32–36)
MCHC RBC-ENTMCNC: 29.1 GM/DL — LOW (ref 32–36)
MCHC RBC-ENTMCNC: 29.4 GM/DL — LOW (ref 32–36)
MCHC RBC-ENTMCNC: 29.5 GM/DL — LOW (ref 32–36)
MCHC RBC-ENTMCNC: 29.6 GM/DL — LOW (ref 32–36)
MCHC RBC-ENTMCNC: 29.8 GM/DL — LOW (ref 32–36)
MCHC RBC-ENTMCNC: 30 GM/DL — LOW (ref 32–36)
MCHC RBC-ENTMCNC: 30.1 GM/DL — LOW (ref 32–36)
MCHC RBC-ENTMCNC: 30.1 GM/DL — LOW (ref 32–36)
MCHC RBC-ENTMCNC: 30.3 GM/DL — LOW (ref 32–36)
MCHC RBC-ENTMCNC: 30.6 GM/DL — LOW (ref 32–36)
MCHC RBC-ENTMCNC: 30.7 GM/DL — LOW (ref 32–36)
MCHC RBC-ENTMCNC: 30.7 GM/DL — LOW (ref 32–36)
MCHC RBC-ENTMCNC: 30.7 PG — SIGNIFICANT CHANGE UP (ref 27–34)
MCHC RBC-ENTMCNC: 30.8 PG — SIGNIFICANT CHANGE UP (ref 27–34)
MCHC RBC-ENTMCNC: 30.9 GM/DL — LOW (ref 32–36)
MCHC RBC-ENTMCNC: 30.9 PG — SIGNIFICANT CHANGE UP (ref 27–34)
MCHC RBC-ENTMCNC: 30.9 PG — SIGNIFICANT CHANGE UP (ref 27–34)
MCHC RBC-ENTMCNC: 31 GM/DL — LOW (ref 32–36)
MCHC RBC-ENTMCNC: 31 GM/DL — LOW (ref 32–36)
MCHC RBC-ENTMCNC: 31 PG — SIGNIFICANT CHANGE UP (ref 27–34)
MCHC RBC-ENTMCNC: 31 PG — SIGNIFICANT CHANGE UP (ref 27–34)
MCHC RBC-ENTMCNC: 31.1 GM/DL — LOW (ref 32–36)
MCHC RBC-ENTMCNC: 31.1 GM/DL — LOW (ref 32–36)
MCHC RBC-ENTMCNC: 31.2 PG — SIGNIFICANT CHANGE UP (ref 27–34)
MCHC RBC-ENTMCNC: 31.3 GM/DL — LOW (ref 32–36)
MCHC RBC-ENTMCNC: 31.3 PG — SIGNIFICANT CHANGE UP (ref 27–34)
MCHC RBC-ENTMCNC: 31.3 PG — SIGNIFICANT CHANGE UP (ref 27–34)
MCHC RBC-ENTMCNC: 31.4 GM/DL — LOW (ref 32–36)
MCHC RBC-ENTMCNC: 31.4 GM/DL — LOW (ref 32–36)
MCHC RBC-ENTMCNC: 31.4 PG — SIGNIFICANT CHANGE UP (ref 27–34)
MCHC RBC-ENTMCNC: 31.5 PG — SIGNIFICANT CHANGE UP (ref 27–34)
MCHC RBC-ENTMCNC: 31.6 GM/DL — LOW (ref 32–36)
MCHC RBC-ENTMCNC: 31.6 GM/DL — LOW (ref 32–36)
MCHC RBC-ENTMCNC: 31.6 PG — SIGNIFICANT CHANGE UP (ref 27–34)
MCHC RBC-ENTMCNC: 31.8 PG — SIGNIFICANT CHANGE UP (ref 27–34)
MCHC RBC-ENTMCNC: 31.9 GM/DL — LOW (ref 32–36)
MCHC RBC-ENTMCNC: 31.9 GM/DL — LOW (ref 32–36)
MCHC RBC-ENTMCNC: 31.9 PG — SIGNIFICANT CHANGE UP (ref 27–34)
MCHC RBC-ENTMCNC: 32 PG — SIGNIFICANT CHANGE UP (ref 27–34)
MCHC RBC-ENTMCNC: 32 PG — SIGNIFICANT CHANGE UP (ref 27–34)
MCHC RBC-ENTMCNC: 32.1 GM/DL — SIGNIFICANT CHANGE UP (ref 32–36)
MCHC RBC-ENTMCNC: 32.1 PG — SIGNIFICANT CHANGE UP (ref 27–34)
MCHC RBC-ENTMCNC: 32.1 PG — SIGNIFICANT CHANGE UP (ref 27–34)
MCHC RBC-ENTMCNC: 32.3 GM/DL — SIGNIFICANT CHANGE UP (ref 32–36)
MCHC RBC-ENTMCNC: 32.4 PG — SIGNIFICANT CHANGE UP (ref 27–34)
MCHC RBC-ENTMCNC: 32.5 GM/DL — SIGNIFICANT CHANGE UP (ref 32–36)
MCHC RBC-ENTMCNC: 32.5 PG — SIGNIFICANT CHANGE UP (ref 27–34)
MCHC RBC-ENTMCNC: 32.6 PG — SIGNIFICANT CHANGE UP (ref 27–34)
MCHC RBC-ENTMCNC: 32.7 GM/DL — SIGNIFICANT CHANGE UP (ref 32–36)
MCHC RBC-ENTMCNC: 32.7 PG — SIGNIFICANT CHANGE UP (ref 27–34)
MCHC RBC-ENTMCNC: 32.7 PG — SIGNIFICANT CHANGE UP (ref 27–34)
MCHC RBC-ENTMCNC: 32.8 GM/DL — SIGNIFICANT CHANGE UP (ref 32–36)
MCHC RBC-ENTMCNC: 32.8 PG — SIGNIFICANT CHANGE UP (ref 27–34)
MCHC RBC-ENTMCNC: 33 GM/DL — SIGNIFICANT CHANGE UP (ref 32–36)
MCHC RBC-ENTMCNC: 33 GM/DL — SIGNIFICANT CHANGE UP (ref 32–36)
MCHC RBC-ENTMCNC: 33 PG — SIGNIFICANT CHANGE UP (ref 27–34)
MCHC RBC-ENTMCNC: 33.1 GM/DL — SIGNIFICANT CHANGE UP (ref 32–36)
MCHC RBC-ENTMCNC: 33.1 PG — SIGNIFICANT CHANGE UP (ref 27–34)
MCHC RBC-ENTMCNC: 33.2 PG — SIGNIFICANT CHANGE UP (ref 27–34)
MCHC RBC-ENTMCNC: 33.3 PG — SIGNIFICANT CHANGE UP (ref 27–34)
MCHC RBC-ENTMCNC: 33.6 PG — SIGNIFICANT CHANGE UP (ref 27–34)
MCHC RBC-ENTMCNC: 33.8 PG — SIGNIFICANT CHANGE UP (ref 27–34)
MCHC RBC-ENTMCNC: 33.9 PG — SIGNIFICANT CHANGE UP (ref 27–34)
MCHC RBC-ENTMCNC: 34 GM/DL — SIGNIFICANT CHANGE UP (ref 32–36)
MCHC RBC-ENTMCNC: 34 GM/DL — SIGNIFICANT CHANGE UP (ref 32–36)
MCHC RBC-ENTMCNC: 34.1 PG — HIGH (ref 27–34)
MCHC RBC-ENTMCNC: 34.2 PG — HIGH (ref 27–34)
MCHC RBC-ENTMCNC: 34.3 GM/DL — SIGNIFICANT CHANGE UP (ref 32–36)
MCHC RBC-ENTMCNC: 34.4 GM/DL — SIGNIFICANT CHANGE UP (ref 32–36)
MCHC RBC-ENTMCNC: 34.4 GM/DL — SIGNIFICANT CHANGE UP (ref 32–36)
MCHC RBC-ENTMCNC: 34.6 GM/DL — SIGNIFICANT CHANGE UP (ref 32–36)
MCV RBC AUTO: 100.6 FL — HIGH (ref 80–100)
MCV RBC AUTO: 102.2 FL — HIGH (ref 80–100)
MCV RBC AUTO: 102.6 FL — HIGH (ref 80–100)
MCV RBC AUTO: 103.3 FL — HIGH (ref 80–100)
MCV RBC AUTO: 103.8 FL — HIGH (ref 80–100)
MCV RBC AUTO: 104.5 FL — HIGH (ref 80–100)
MCV RBC AUTO: 104.6 FL — HIGH (ref 80–100)
MCV RBC AUTO: 104.7 FL — HIGH (ref 80–100)
MCV RBC AUTO: 104.8 FL — HIGH (ref 80–100)
MCV RBC AUTO: 105.5 FL — HIGH (ref 80–100)
MCV RBC AUTO: 105.5 FL — HIGH (ref 80–100)
MCV RBC AUTO: 105.8 FL — HIGH (ref 80–100)
MCV RBC AUTO: 106 FL — HIGH (ref 80–100)
MCV RBC AUTO: 106.2 FL — HIGH (ref 80–100)
MCV RBC AUTO: 106.6 FL — HIGH (ref 80–100)
MCV RBC AUTO: 106.7 FL — HIGH (ref 80–100)
MCV RBC AUTO: 107.1 FL — HIGH (ref 80–100)
MCV RBC AUTO: 107.2 FL — HIGH (ref 80–100)
MCV RBC AUTO: 107.2 FL — HIGH (ref 80–100)
MCV RBC AUTO: 107.6 FL — HIGH (ref 80–100)
MCV RBC AUTO: 107.6 FL — HIGH (ref 80–100)
MCV RBC AUTO: 107.8 FL — HIGH (ref 80–100)
MCV RBC AUTO: 107.9 FL — HIGH (ref 80–100)
MCV RBC AUTO: 108.4 FL — HIGH (ref 80–100)
MCV RBC AUTO: 108.8 FL — HIGH (ref 80–100)
MCV RBC AUTO: 108.9 FL — HIGH (ref 80–100)
MCV RBC AUTO: 109 FL — HIGH (ref 80–100)
MCV RBC AUTO: 109 FL — HIGH (ref 80–100)
MCV RBC AUTO: 110.3 FL — HIGH (ref 80–100)
MCV RBC AUTO: 110.7 FL — HIGH (ref 80–100)
MCV RBC AUTO: 111.2 FL — HIGH (ref 80–100)
MCV RBC AUTO: 111.3 FL — HIGH (ref 80–100)
MCV RBC AUTO: 111.4 FL — HIGH (ref 80–100)
MCV RBC AUTO: 112.6 FL — HIGH (ref 80–100)
MCV RBC AUTO: 113.8 FL — HIGH (ref 80–100)
MCV RBC AUTO: 114.3 FL — HIGH (ref 80–100)
MCV RBC AUTO: 114.3 FL — HIGH (ref 80–100)
MCV RBC AUTO: 115.9 FL — HIGH (ref 80–100)
MCV RBC AUTO: 89.1 FL — SIGNIFICANT CHANGE UP (ref 80–100)
MCV RBC AUTO: 89.3 FL — SIGNIFICANT CHANGE UP (ref 80–100)
MCV RBC AUTO: 89.9 FL — SIGNIFICANT CHANGE UP (ref 80–100)
MCV RBC AUTO: 91.1 FL — SIGNIFICANT CHANGE UP (ref 80–100)
MCV RBC AUTO: 91.1 FL — SIGNIFICANT CHANGE UP (ref 80–100)
MCV RBC AUTO: 91.6 FL — SIGNIFICANT CHANGE UP (ref 80–100)
MCV RBC AUTO: 95.4 FL — SIGNIFICANT CHANGE UP (ref 80–100)
MCV RBC AUTO: 96.5 FL — SIGNIFICANT CHANGE UP (ref 80–100)
MCV RBC AUTO: 96.7 FL — SIGNIFICANT CHANGE UP (ref 80–100)
MCV RBC AUTO: 97.3 FL — SIGNIFICANT CHANGE UP (ref 80–100)
MCV RBC AUTO: 97.8 FL — SIGNIFICANT CHANGE UP (ref 80–100)
MCV RBC AUTO: 98.1 FL — SIGNIFICANT CHANGE UP (ref 80–100)
MCV RBC AUTO: 98.1 FL — SIGNIFICANT CHANGE UP (ref 80–100)
MCV RBC AUTO: 99.3 FL — SIGNIFICANT CHANGE UP (ref 80–100)
MCV RBC AUTO: 99.5 FL — SIGNIFICANT CHANGE UP (ref 80–100)
METHOD TYPE: SIGNIFICANT CHANGE UP
MONOCYTES # BLD AUTO: 0.7 K/UL — SIGNIFICANT CHANGE UP (ref 0–0.9)
MONOCYTES # BLD AUTO: 0.71 K/UL — SIGNIFICANT CHANGE UP (ref 0–0.9)
MONOCYTES # BLD AUTO: 0.75 K/UL — SIGNIFICANT CHANGE UP (ref 0–0.9)
MONOCYTES NFR BLD AUTO: 10.1 % — SIGNIFICANT CHANGE UP (ref 2–14)
MONOCYTES NFR BLD AUTO: 10.9 % — SIGNIFICANT CHANGE UP (ref 2–14)
MONOCYTES NFR BLD AUTO: 8.7 % — SIGNIFICANT CHANGE UP (ref 2–14)
MRSA PCR RESULT.: SIGNIFICANT CHANGE UP
NEUTROPHILS # BLD AUTO: 4.59 K/UL — SIGNIFICANT CHANGE UP (ref 1.8–7.4)
NEUTROPHILS # BLD AUTO: 4.66 K/UL — SIGNIFICANT CHANGE UP (ref 1.8–7.4)
NEUTROPHILS # BLD AUTO: 5.69 K/UL — SIGNIFICANT CHANGE UP (ref 1.8–7.4)
NEUTROPHILS NFR BLD AUTO: 67.1 % — SIGNIFICANT CHANGE UP (ref 43–77)
NEUTROPHILS NFR BLD AUTO: 67.2 % — SIGNIFICANT CHANGE UP (ref 43–77)
NEUTROPHILS NFR BLD AUTO: 69.6 % — SIGNIFICANT CHANGE UP (ref 43–77)
NITRITE UR-MCNC: NEGATIVE — SIGNIFICANT CHANGE UP
NRBC # BLD: 0 /100 WBCS — SIGNIFICANT CHANGE UP (ref 0–0)
OB PNL STL: NEGATIVE — SIGNIFICANT CHANGE UP
OB PNL STL: POSITIVE
ORGANISM # SPEC MICROSCOPIC CNT: SIGNIFICANT CHANGE UP
OSMOLALITY UR: 344 MOS/KG — SIGNIFICANT CHANGE UP (ref 300–900)
PCO2 BLDV: 44 MMHG — SIGNIFICANT CHANGE UP (ref 42–55)
PCO2 BLDV: 47 MMHG — SIGNIFICANT CHANGE UP (ref 42–55)
PH BLDV: 7.35 — SIGNIFICANT CHANGE UP (ref 7.32–7.43)
PH BLDV: 7.36 — SIGNIFICANT CHANGE UP (ref 7.32–7.43)
PH UR: 6 — SIGNIFICANT CHANGE UP (ref 5–8)
PH UR: 6.5 — SIGNIFICANT CHANGE UP (ref 5–8)
PH UR: 6.5 — SIGNIFICANT CHANGE UP (ref 5–8)
PHOSPHATE SERPL-MCNC: 1.6 MG/DL — LOW (ref 2.5–4.5)
PHOSPHATE SERPL-MCNC: 1.9 MG/DL — LOW (ref 2.5–4.5)
PHOSPHATE SERPL-MCNC: 1.9 MG/DL — LOW (ref 2.5–4.5)
PHOSPHATE SERPL-MCNC: 2 MG/DL — LOW (ref 2.5–4.5)
PHOSPHATE SERPL-MCNC: 2.5 MG/DL — SIGNIFICANT CHANGE UP (ref 2.5–4.5)
PHOSPHATE SERPL-MCNC: 2.5 MG/DL — SIGNIFICANT CHANGE UP (ref 2.5–4.5)
PHOSPHATE SERPL-MCNC: 2.7 MG/DL — SIGNIFICANT CHANGE UP (ref 2.5–4.5)
PHOSPHATE SERPL-MCNC: 2.8 MG/DL — SIGNIFICANT CHANGE UP (ref 2.5–4.5)
PHOSPHATE SERPL-MCNC: 3.1 MG/DL — SIGNIFICANT CHANGE UP (ref 2.5–4.5)
PHOSPHATE SERPL-MCNC: 3.3 MG/DL — SIGNIFICANT CHANGE UP (ref 2.5–4.5)
PHOSPHATE SERPL-MCNC: 3.5 MG/DL — SIGNIFICANT CHANGE UP (ref 2.5–4.5)
PHOSPHATE SERPL-MCNC: 3.5 MG/DL — SIGNIFICANT CHANGE UP (ref 2.5–4.5)
PHOSPHATE SERPL-MCNC: 3.7 MG/DL — SIGNIFICANT CHANGE UP (ref 2.5–4.5)
PHOSPHATE SERPL-MCNC: 3.7 MG/DL — SIGNIFICANT CHANGE UP (ref 2.5–4.5)
PHOSPHATE SERPL-MCNC: 3.8 MG/DL — SIGNIFICANT CHANGE UP (ref 2.5–4.5)
PHOSPHATE SERPL-MCNC: 4 MG/DL — SIGNIFICANT CHANGE UP (ref 2.5–4.5)
PHOSPHATE SERPL-MCNC: 4.1 MG/DL — SIGNIFICANT CHANGE UP (ref 2.5–4.5)
PHOSPHATE SERPL-MCNC: 4.2 MG/DL — SIGNIFICANT CHANGE UP (ref 2.5–4.5)
PHOSPHATE SERPL-MCNC: 5.7 MG/DL — HIGH (ref 2.5–4.5)
PLAT MORPH BLD: NORMAL — SIGNIFICANT CHANGE UP
PLATELET # BLD AUTO: 116 K/UL — LOW (ref 150–400)
PLATELET # BLD AUTO: 119 K/UL — LOW (ref 150–400)
PLATELET # BLD AUTO: 130 K/UL — LOW (ref 150–400)
PLATELET # BLD AUTO: 131 K/UL — LOW (ref 150–400)
PLATELET # BLD AUTO: 134 K/UL — LOW (ref 150–400)
PLATELET # BLD AUTO: 136 K/UL — LOW (ref 150–400)
PLATELET # BLD AUTO: 142 K/UL — LOW (ref 150–400)
PLATELET # BLD AUTO: 142 K/UL — LOW (ref 150–400)
PLATELET # BLD AUTO: 147 K/UL — LOW (ref 150–400)
PLATELET # BLD AUTO: 152 K/UL — SIGNIFICANT CHANGE UP (ref 150–400)
PLATELET # BLD AUTO: 157 K/UL — SIGNIFICANT CHANGE UP (ref 150–400)
PLATELET # BLD AUTO: 161 K/UL — SIGNIFICANT CHANGE UP (ref 150–400)
PLATELET # BLD AUTO: 163 K/UL — SIGNIFICANT CHANGE UP (ref 150–400)
PLATELET # BLD AUTO: 164 K/UL — SIGNIFICANT CHANGE UP (ref 150–400)
PLATELET # BLD AUTO: 173 K/UL — SIGNIFICANT CHANGE UP (ref 150–400)
PLATELET # BLD AUTO: 174 K/UL — SIGNIFICANT CHANGE UP (ref 150–400)
PLATELET # BLD AUTO: 175 K/UL — SIGNIFICANT CHANGE UP (ref 150–400)
PLATELET # BLD AUTO: 179 K/UL — SIGNIFICANT CHANGE UP (ref 150–400)
PLATELET # BLD AUTO: 183 K/UL — SIGNIFICANT CHANGE UP (ref 150–400)
PLATELET # BLD AUTO: 184 K/UL — SIGNIFICANT CHANGE UP (ref 150–400)
PLATELET # BLD AUTO: 187 K/UL — SIGNIFICANT CHANGE UP (ref 150–400)
PLATELET # BLD AUTO: 203 K/UL — SIGNIFICANT CHANGE UP (ref 150–400)
PLATELET # BLD AUTO: 208 K/UL — SIGNIFICANT CHANGE UP (ref 150–400)
PLATELET # BLD AUTO: 213 K/UL — SIGNIFICANT CHANGE UP (ref 150–400)
PLATELET # BLD AUTO: 217 K/UL — SIGNIFICANT CHANGE UP (ref 150–400)
PLATELET # BLD AUTO: 217 K/UL — SIGNIFICANT CHANGE UP (ref 150–400)
PLATELET # BLD AUTO: 221 K/UL — SIGNIFICANT CHANGE UP (ref 150–400)
PLATELET # BLD AUTO: 226 K/UL — SIGNIFICANT CHANGE UP (ref 150–400)
PLATELET # BLD AUTO: 227 K/UL — SIGNIFICANT CHANGE UP (ref 150–400)
PLATELET # BLD AUTO: 230 K/UL — SIGNIFICANT CHANGE UP (ref 150–400)
PLATELET # BLD AUTO: 231 K/UL — SIGNIFICANT CHANGE UP (ref 150–400)
PLATELET # BLD AUTO: 237 K/UL — SIGNIFICANT CHANGE UP (ref 150–400)
PLATELET # BLD AUTO: 241 K/UL — SIGNIFICANT CHANGE UP (ref 150–400)
PLATELET # BLD AUTO: 248 K/UL — SIGNIFICANT CHANGE UP (ref 150–400)
PLATELET # BLD AUTO: 251 K/UL — SIGNIFICANT CHANGE UP (ref 150–400)
PLATELET # BLD AUTO: 264 K/UL — SIGNIFICANT CHANGE UP (ref 150–400)
PLATELET # BLD AUTO: 281 K/UL — SIGNIFICANT CHANGE UP (ref 150–400)
PLATELET # BLD AUTO: 285 K/UL — SIGNIFICANT CHANGE UP (ref 150–400)
PLATELET # BLD AUTO: 294 K/UL — SIGNIFICANT CHANGE UP (ref 150–400)
PLATELET # BLD AUTO: 307 K/UL — SIGNIFICANT CHANGE UP (ref 150–400)
PLATELET # BLD AUTO: 316 K/UL — SIGNIFICANT CHANGE UP (ref 150–400)
PLATELET # BLD AUTO: 319 K/UL — SIGNIFICANT CHANGE UP (ref 150–400)
PLATELET # BLD AUTO: 326 K/UL — SIGNIFICANT CHANGE UP (ref 150–400)
PLATELET # BLD AUTO: 327 K/UL — SIGNIFICANT CHANGE UP (ref 150–400)
PLATELET # BLD AUTO: 334 K/UL — SIGNIFICANT CHANGE UP (ref 150–400)
PLATELET # BLD AUTO: 336 K/UL — SIGNIFICANT CHANGE UP (ref 150–400)
PLATELET # BLD AUTO: 340 K/UL — SIGNIFICANT CHANGE UP (ref 150–400)
PLATELET # BLD AUTO: 347 K/UL — SIGNIFICANT CHANGE UP (ref 150–400)
PLATELET # BLD AUTO: 352 K/UL — SIGNIFICANT CHANGE UP (ref 150–400)
PLATELET # BLD AUTO: 356 K/UL — SIGNIFICANT CHANGE UP (ref 150–400)
PLATELET # BLD AUTO: 377 K/UL — SIGNIFICANT CHANGE UP (ref 150–400)
PO2 BLDV: 19 MMHG — LOW (ref 25–45)
PO2 BLDV: 27 MMHG — SIGNIFICANT CHANGE UP (ref 25–45)
POTASSIUM BLDV-SCNC: 3.7 MMOL/L — SIGNIFICANT CHANGE UP (ref 3.5–5.1)
POTASSIUM BLDV-SCNC: 4.3 MMOL/L — SIGNIFICANT CHANGE UP (ref 3.5–5.1)
POTASSIUM SERPL-MCNC: 2.8 MMOL/L — CRITICAL LOW (ref 3.5–5.3)
POTASSIUM SERPL-MCNC: 3.2 MMOL/L — LOW (ref 3.5–5.3)
POTASSIUM SERPL-MCNC: 3.3 MMOL/L — LOW (ref 3.5–5.3)
POTASSIUM SERPL-MCNC: 3.3 MMOL/L — LOW (ref 3.5–5.3)
POTASSIUM SERPL-MCNC: 3.4 MMOL/L — LOW (ref 3.5–5.3)
POTASSIUM SERPL-MCNC: 3.4 MMOL/L — LOW (ref 3.5–5.3)
POTASSIUM SERPL-MCNC: 3.5 MMOL/L — SIGNIFICANT CHANGE UP (ref 3.5–5.3)
POTASSIUM SERPL-MCNC: 3.6 MMOL/L — SIGNIFICANT CHANGE UP (ref 3.5–5.3)
POTASSIUM SERPL-MCNC: 3.7 MMOL/L — SIGNIFICANT CHANGE UP (ref 3.5–5.3)
POTASSIUM SERPL-MCNC: 3.8 MMOL/L — SIGNIFICANT CHANGE UP (ref 3.5–5.3)
POTASSIUM SERPL-MCNC: 3.9 MMOL/L — SIGNIFICANT CHANGE UP (ref 3.5–5.3)
POTASSIUM SERPL-MCNC: 4 MMOL/L — SIGNIFICANT CHANGE UP (ref 3.5–5.3)
POTASSIUM SERPL-MCNC: 4.1 MMOL/L — SIGNIFICANT CHANGE UP (ref 3.5–5.3)
POTASSIUM SERPL-MCNC: 4.2 MMOL/L — SIGNIFICANT CHANGE UP (ref 3.5–5.3)
POTASSIUM SERPL-MCNC: 4.3 MMOL/L — SIGNIFICANT CHANGE UP (ref 3.5–5.3)
POTASSIUM SERPL-MCNC: 4.4 MMOL/L — SIGNIFICANT CHANGE UP (ref 3.5–5.3)
POTASSIUM SERPL-MCNC: 4.4 MMOL/L — SIGNIFICANT CHANGE UP (ref 3.5–5.3)
POTASSIUM SERPL-MCNC: 4.5 MMOL/L — SIGNIFICANT CHANGE UP (ref 3.5–5.3)
POTASSIUM SERPL-MCNC: 4.5 MMOL/L — SIGNIFICANT CHANGE UP (ref 3.5–5.3)
POTASSIUM SERPL-MCNC: 4.6 MMOL/L — SIGNIFICANT CHANGE UP (ref 3.5–5.3)
POTASSIUM SERPL-MCNC: 4.7 MMOL/L — SIGNIFICANT CHANGE UP (ref 3.5–5.3)
POTASSIUM SERPL-MCNC: 4.7 MMOL/L — SIGNIFICANT CHANGE UP (ref 3.5–5.3)
POTASSIUM SERPL-MCNC: 4.9 MMOL/L — SIGNIFICANT CHANGE UP (ref 3.5–5.3)
POTASSIUM SERPL-MCNC: 5 MMOL/L — SIGNIFICANT CHANGE UP (ref 3.5–5.3)
POTASSIUM SERPL-SCNC: 2.8 MMOL/L — CRITICAL LOW (ref 3.5–5.3)
POTASSIUM SERPL-SCNC: 3.2 MMOL/L — LOW (ref 3.5–5.3)
POTASSIUM SERPL-SCNC: 3.3 MMOL/L — LOW (ref 3.5–5.3)
POTASSIUM SERPL-SCNC: 3.3 MMOL/L — LOW (ref 3.5–5.3)
POTASSIUM SERPL-SCNC: 3.4 MMOL/L — LOW (ref 3.5–5.3)
POTASSIUM SERPL-SCNC: 3.4 MMOL/L — LOW (ref 3.5–5.3)
POTASSIUM SERPL-SCNC: 3.5 MMOL/L — SIGNIFICANT CHANGE UP (ref 3.5–5.3)
POTASSIUM SERPL-SCNC: 3.6 MMOL/L — SIGNIFICANT CHANGE UP (ref 3.5–5.3)
POTASSIUM SERPL-SCNC: 3.7 MMOL/L — SIGNIFICANT CHANGE UP (ref 3.5–5.3)
POTASSIUM SERPL-SCNC: 3.8 MMOL/L — SIGNIFICANT CHANGE UP (ref 3.5–5.3)
POTASSIUM SERPL-SCNC: 3.9 MMOL/L — SIGNIFICANT CHANGE UP (ref 3.5–5.3)
POTASSIUM SERPL-SCNC: 4 MMOL/L — SIGNIFICANT CHANGE UP (ref 3.5–5.3)
POTASSIUM SERPL-SCNC: 4.1 MMOL/L — SIGNIFICANT CHANGE UP (ref 3.5–5.3)
POTASSIUM SERPL-SCNC: 4.2 MMOL/L — SIGNIFICANT CHANGE UP (ref 3.5–5.3)
POTASSIUM SERPL-SCNC: 4.3 MMOL/L — SIGNIFICANT CHANGE UP (ref 3.5–5.3)
POTASSIUM SERPL-SCNC: 4.4 MMOL/L — SIGNIFICANT CHANGE UP (ref 3.5–5.3)
POTASSIUM SERPL-SCNC: 4.4 MMOL/L — SIGNIFICANT CHANGE UP (ref 3.5–5.3)
POTASSIUM SERPL-SCNC: 4.5 MMOL/L — SIGNIFICANT CHANGE UP (ref 3.5–5.3)
POTASSIUM SERPL-SCNC: 4.5 MMOL/L — SIGNIFICANT CHANGE UP (ref 3.5–5.3)
POTASSIUM SERPL-SCNC: 4.6 MMOL/L — SIGNIFICANT CHANGE UP (ref 3.5–5.3)
POTASSIUM SERPL-SCNC: 4.7 MMOL/L — SIGNIFICANT CHANGE UP (ref 3.5–5.3)
POTASSIUM SERPL-SCNC: 4.7 MMOL/L — SIGNIFICANT CHANGE UP (ref 3.5–5.3)
POTASSIUM SERPL-SCNC: 4.9 MMOL/L — SIGNIFICANT CHANGE UP (ref 3.5–5.3)
POTASSIUM SERPL-SCNC: 5 MMOL/L — SIGNIFICANT CHANGE UP (ref 3.5–5.3)
PROT SERPL-MCNC: 3.5 G/DL — LOW (ref 6–8.3)
PROT SERPL-MCNC: 5.3 G/DL — LOW (ref 6–8.3)
PROT SERPL-MCNC: 5.5 G/DL — LOW (ref 6–8.3)
PROT SERPL-MCNC: 5.5 G/DL — LOW (ref 6–8.3)
PROT SERPL-MCNC: 5.6 G/DL — LOW (ref 6–8.3)
PROT SERPL-MCNC: 5.8 G/DL — LOW (ref 6–8.3)
PROT SERPL-MCNC: 5.8 G/DL — LOW (ref 6–8.3)
PROT SERPL-MCNC: 7 G/DL — SIGNIFICANT CHANGE UP (ref 6–8.3)
PROT SERPL-MCNC: 7.6 G/DL — SIGNIFICANT CHANGE UP (ref 6–8.3)
PROT SERPL-MCNC: 8.2 G/DL — SIGNIFICANT CHANGE UP (ref 6–8.3)
PROT SERPL-MCNC: 8.3 G/DL — SIGNIFICANT CHANGE UP (ref 6–8.3)
PROT UR-MCNC: 100 — SIGNIFICANT CHANGE UP
PROT UR-MCNC: 100 — SIGNIFICANT CHANGE UP
PROT UR-MCNC: ABNORMAL
PROTHROM AB SERPL-ACNC: 11.2 SEC — SIGNIFICANT CHANGE UP (ref 10.6–13.6)
PROTHROM AB SERPL-ACNC: 11.8 SEC — SIGNIFICANT CHANGE UP (ref 10.6–13.6)
PROTHROM AB SERPL-ACNC: 12.1 SEC — SIGNIFICANT CHANGE UP (ref 10.6–13.6)
PROTHROM AB SERPL-ACNC: 12.6 SEC — SIGNIFICANT CHANGE UP (ref 10.6–13.6)
PROTHROM AB SERPL-ACNC: 14.1 SEC — HIGH (ref 10.6–13.6)
RBC # BLD: 1.49 M/UL — LOW (ref 4.2–5.8)
RBC # BLD: 1.57 M/UL — LOW (ref 4.2–5.8)
RBC # BLD: 1.68 M/UL — LOW (ref 4.2–5.8)
RBC # BLD: 1.74 M/UL — LOW (ref 4.2–5.8)
RBC # BLD: 1.82 M/UL — LOW (ref 4.2–5.8)
RBC # BLD: 1.83 M/UL — LOW (ref 4.2–5.8)
RBC # BLD: 2.07 M/UL — LOW (ref 4.2–5.8)
RBC # BLD: 2.08 M/UL — LOW (ref 4.2–5.8)
RBC # BLD: 2.14 M/UL — LOW (ref 4.2–5.8)
RBC # BLD: 2.14 M/UL — LOW (ref 4.2–5.8)
RBC # BLD: 2.16 M/UL — LOW (ref 4.2–5.8)
RBC # BLD: 2.26 M/UL — LOW (ref 4.2–5.8)
RBC # BLD: 2.27 M/UL — LOW (ref 4.2–5.8)
RBC # BLD: 2.31 M/UL — LOW (ref 4.2–5.8)
RBC # BLD: 2.32 M/UL — LOW (ref 4.2–5.8)
RBC # BLD: 2.43 M/UL — LOW (ref 4.2–5.8)
RBC # BLD: 2.44 M/UL — LOW (ref 4.2–5.8)
RBC # BLD: 2.44 M/UL — LOW (ref 4.2–5.8)
RBC # BLD: 2.53 M/UL — LOW (ref 4.2–5.8)
RBC # BLD: 2.6 M/UL — LOW (ref 4.2–5.8)
RBC # BLD: 2.62 M/UL — LOW (ref 4.2–5.8)
RBC # BLD: 2.67 M/UL — LOW (ref 4.2–5.8)
RBC # BLD: 2.68 M/UL — LOW (ref 4.2–5.8)
RBC # BLD: 2.68 M/UL — LOW (ref 4.2–5.8)
RBC # BLD: 2.71 M/UL — LOW (ref 4.2–5.8)
RBC # BLD: 2.72 M/UL — LOW (ref 4.2–5.8)
RBC # BLD: 2.74 M/UL — LOW (ref 4.2–5.8)
RBC # BLD: 2.78 M/UL — LOW (ref 4.2–5.8)
RBC # BLD: 2.81 M/UL — LOW (ref 4.2–5.8)
RBC # BLD: 2.83 M/UL — LOW (ref 4.2–5.8)
RBC # BLD: 2.83 M/UL — LOW (ref 4.2–5.8)
RBC # BLD: 2.85 M/UL — LOW (ref 4.2–5.8)
RBC # BLD: 2.88 M/UL — LOW (ref 4.2–5.8)
RBC # BLD: 2.89 M/UL — LOW (ref 4.2–5.8)
RBC # BLD: 2.93 M/UL — LOW (ref 4.2–5.8)
RBC # BLD: 2.93 M/UL — LOW (ref 4.2–5.8)
RBC # BLD: 2.96 M/UL — LOW (ref 4.2–5.8)
RBC # BLD: 2.99 M/UL — LOW (ref 4.2–5.8)
RBC # BLD: 3.07 M/UL — LOW (ref 4.2–5.8)
RBC # BLD: 3.12 M/UL — LOW (ref 4.2–5.8)
RBC # BLD: 3.13 M/UL — LOW (ref 4.2–5.8)
RBC # BLD: 3.14 M/UL — LOW (ref 4.2–5.8)
RBC # BLD: 3.17 M/UL — LOW (ref 4.2–5.8)
RBC # BLD: 3.19 M/UL — LOW (ref 4.2–5.8)
RBC # BLD: 3.21 M/UL — LOW (ref 4.2–5.8)
RBC # BLD: 3.21 M/UL — LOW (ref 4.2–5.8)
RBC # BLD: 3.27 M/UL — LOW (ref 4.2–5.8)
RBC # BLD: 3.27 M/UL — LOW (ref 4.2–5.8)
RBC # BLD: 3.29 M/UL — LOW (ref 4.2–5.8)
RBC # BLD: 3.35 M/UL — LOW (ref 4.2–5.8)
RBC # BLD: 3.49 M/UL — LOW (ref 4.2–5.8)
RBC # FLD: 17.2 % — HIGH (ref 10.3–14.5)
RBC # FLD: 17.3 % — HIGH (ref 10.3–14.5)
RBC # FLD: 17.4 % — HIGH (ref 10.3–14.5)
RBC # FLD: 18.4 % — HIGH (ref 10.3–14.5)
RBC # FLD: 18.8 % — HIGH (ref 10.3–14.5)
RBC # FLD: 18.9 % — HIGH (ref 10.3–14.5)
RBC # FLD: 19 % — HIGH (ref 10.3–14.5)
RBC # FLD: 19.1 % — HIGH (ref 10.3–14.5)
RBC # FLD: 19.2 % — HIGH (ref 10.3–14.5)
RBC # FLD: 19.2 % — HIGH (ref 10.3–14.5)
RBC # FLD: 19.3 % — HIGH (ref 10.3–14.5)
RBC # FLD: 19.3 % — HIGH (ref 10.3–14.5)
RBC # FLD: 20.1 % — HIGH (ref 10.3–14.5)
RBC # FLD: 20.3 % — HIGH (ref 10.3–14.5)
RBC # FLD: 21.1 % — HIGH (ref 10.3–14.5)
RBC # FLD: 21.4 % — HIGH (ref 10.3–14.5)
RBC # FLD: 21.5 % — HIGH (ref 10.3–14.5)
RBC # FLD: 21.7 % — HIGH (ref 10.3–14.5)
RBC # FLD: 21.7 % — HIGH (ref 10.3–14.5)
RBC # FLD: 21.8 % — HIGH (ref 10.3–14.5)
RBC # FLD: 21.9 % — HIGH (ref 10.3–14.5)
RBC # FLD: 21.9 % — HIGH (ref 10.3–14.5)
RBC # FLD: 22.1 % — HIGH (ref 10.3–14.5)
RBC # FLD: 22.1 % — HIGH (ref 10.3–14.5)
RBC # FLD: 22.4 % — HIGH (ref 10.3–14.5)
RBC # FLD: 22.4 % — HIGH (ref 10.3–14.5)
RBC # FLD: 22.5 % — HIGH (ref 10.3–14.5)
RBC # FLD: 22.6 % — HIGH (ref 10.3–14.5)
RBC # FLD: 22.7 % — HIGH (ref 10.3–14.5)
RBC # FLD: 22.7 % — HIGH (ref 10.3–14.5)
RBC # FLD: 22.8 % — HIGH (ref 10.3–14.5)
RBC # FLD: 22.8 % — HIGH (ref 10.3–14.5)
RBC # FLD: 22.9 % — HIGH (ref 10.3–14.5)
RBC # FLD: 23 % — HIGH (ref 10.3–14.5)
RBC # FLD: 23.1 % — HIGH (ref 10.3–14.5)
RBC # FLD: 23.1 % — HIGH (ref 10.3–14.5)
RBC # FLD: 23.3 % — HIGH (ref 10.3–14.5)
RBC # FLD: 23.4 % — HIGH (ref 10.3–14.5)
RBC # FLD: 23.5 % — HIGH (ref 10.3–14.5)
RBC # FLD: 23.7 % — HIGH (ref 10.3–14.5)
RBC # FLD: 23.9 % — HIGH (ref 10.3–14.5)
RBC BLD AUTO: SIGNIFICANT CHANGE UP
RBC CASTS # UR COMP ASSIST: 0 /HPF — SIGNIFICANT CHANGE UP (ref 0–4)
RBC CASTS # UR COMP ASSIST: 3 /HPF — SIGNIFICANT CHANGE UP (ref 0–4)
RH IG SCN BLD-IMP: POSITIVE — SIGNIFICANT CHANGE UP
S AUREUS DNA NOSE QL NAA+PROBE: DETECTED
SAO2 % BLDV: 30.1 % — LOW (ref 67–88)
SAO2 % BLDV: 54.1 % — LOW (ref 67–88)
SARS-COV-2 RNA SPEC QL NAA+PROBE: DETECTED
SODIUM SERPL-SCNC: 116 MMOL/L — CRITICAL LOW (ref 135–145)
SODIUM SERPL-SCNC: 123 MMOL/L — LOW (ref 135–145)
SODIUM SERPL-SCNC: 124 MMOL/L — LOW (ref 135–145)
SODIUM SERPL-SCNC: 126 MMOL/L — LOW (ref 135–145)
SODIUM SERPL-SCNC: 126 MMOL/L — LOW (ref 135–145)
SODIUM SERPL-SCNC: 127 MMOL/L — LOW (ref 135–145)
SODIUM SERPL-SCNC: 128 MMOL/L — LOW (ref 135–145)
SODIUM SERPL-SCNC: 129 MMOL/L — LOW (ref 135–145)
SODIUM SERPL-SCNC: 130 MMOL/L — LOW (ref 135–145)
SODIUM SERPL-SCNC: 131 MMOL/L — LOW (ref 135–145)
SODIUM SERPL-SCNC: 132 MMOL/L — LOW (ref 135–145)
SODIUM SERPL-SCNC: 133 MMOL/L — LOW (ref 135–145)
SODIUM SERPL-SCNC: 133 MMOL/L — LOW (ref 135–145)
SODIUM SERPL-SCNC: 134 MMOL/L — LOW (ref 135–145)
SODIUM SERPL-SCNC: 134 MMOL/L — LOW (ref 135–145)
SODIUM SERPL-SCNC: 135 MMOL/L — SIGNIFICANT CHANGE UP (ref 135–145)
SODIUM SERPL-SCNC: 136 MMOL/L — SIGNIFICANT CHANGE UP (ref 135–145)
SODIUM SERPL-SCNC: 137 MMOL/L — SIGNIFICANT CHANGE UP (ref 135–145)
SODIUM SERPL-SCNC: 137 MMOL/L — SIGNIFICANT CHANGE UP (ref 135–145)
SODIUM SERPL-SCNC: 138 MMOL/L — SIGNIFICANT CHANGE UP (ref 135–145)
SODIUM SERPL-SCNC: 138 MMOL/L — SIGNIFICANT CHANGE UP (ref 135–145)
SODIUM SERPL-SCNC: 139 MMOL/L — SIGNIFICANT CHANGE UP (ref 135–145)
SODIUM SERPL-SCNC: 140 MMOL/L — SIGNIFICANT CHANGE UP (ref 135–145)
SODIUM SERPL-SCNC: 141 MMOL/L — SIGNIFICANT CHANGE UP (ref 135–145)
SODIUM SERPL-SCNC: 145 MMOL/L — SIGNIFICANT CHANGE UP (ref 135–145)
SP GR SPEC: 1.01 — LOW (ref 1.01–1.02)
SP GR SPEC: 1.01 — LOW (ref 1.01–1.02)
SP GR SPEC: 1.01 — SIGNIFICANT CHANGE UP (ref 1.01–1.02)
SPECIMEN SOURCE: SIGNIFICANT CHANGE UP
T4 FREE SERPL-MCNC: 0.9 NG/DL — SIGNIFICANT CHANGE UP (ref 0.9–1.8)
T4 FREE SERPL-MCNC: 1.3 NG/DL — SIGNIFICANT CHANGE UP (ref 0.9–1.8)
T4 FREE SERPL-MCNC: 1.7 NG/DL — SIGNIFICANT CHANGE UP (ref 0.9–1.8)
T4 FREE SERPL-MCNC: 1.7 NG/DL — SIGNIFICANT CHANGE UP (ref 0.9–1.8)
TROPONIN T, HIGH SENSITIVITY RESULT: 173 NG/L — HIGH (ref 0–51)
TSH SERPL-MCNC: 0.48 UIU/ML — SIGNIFICANT CHANGE UP (ref 0.27–4.2)
TSH SERPL-MCNC: 2.82 UIU/ML — SIGNIFICANT CHANGE UP (ref 0.27–4.2)
TSH SERPL-MCNC: 8.67 UIU/ML — HIGH (ref 0.27–4.2)
UROBILINOGEN FLD QL: NEGATIVE — SIGNIFICANT CHANGE UP
WBC # BLD: 10.01 K/UL — SIGNIFICANT CHANGE UP (ref 3.8–10.5)
WBC # BLD: 10.03 K/UL — SIGNIFICANT CHANGE UP (ref 3.8–10.5)
WBC # BLD: 10.1 K/UL — SIGNIFICANT CHANGE UP (ref 3.8–10.5)
WBC # BLD: 10.33 K/UL — SIGNIFICANT CHANGE UP (ref 3.8–10.5)
WBC # BLD: 10.41 K/UL — SIGNIFICANT CHANGE UP (ref 3.8–10.5)
WBC # BLD: 10.88 K/UL — HIGH (ref 3.8–10.5)
WBC # BLD: 11.45 K/UL — HIGH (ref 3.8–10.5)
WBC # BLD: 12.22 K/UL — HIGH (ref 3.8–10.5)
WBC # BLD: 12.52 K/UL — HIGH (ref 3.8–10.5)
WBC # BLD: 12.96 K/UL — HIGH (ref 3.8–10.5)
WBC # BLD: 13.16 K/UL — HIGH (ref 3.8–10.5)
WBC # BLD: 13.36 K/UL — HIGH (ref 3.8–10.5)
WBC # BLD: 13.96 K/UL — HIGH (ref 3.8–10.5)
WBC # BLD: 14.19 K/UL — HIGH (ref 3.8–10.5)
WBC # BLD: 14.62 K/UL — HIGH (ref 3.8–10.5)
WBC # BLD: 16.17 K/UL — HIGH (ref 3.8–10.5)
WBC # BLD: 16.23 K/UL — HIGH (ref 3.8–10.5)
WBC # BLD: 16.53 K/UL — HIGH (ref 3.8–10.5)
WBC # BLD: 18.83 K/UL — HIGH (ref 3.8–10.5)
WBC # BLD: 19.54 K/UL — HIGH (ref 3.8–10.5)
WBC # BLD: 21.29 K/UL — HIGH (ref 3.8–10.5)
WBC # BLD: 6.34 K/UL — SIGNIFICANT CHANGE UP (ref 3.8–10.5)
WBC # BLD: 6.85 K/UL — SIGNIFICANT CHANGE UP (ref 3.8–10.5)
WBC # BLD: 6.93 K/UL — SIGNIFICANT CHANGE UP (ref 3.8–10.5)
WBC # BLD: 7.42 K/UL — SIGNIFICANT CHANGE UP (ref 3.8–10.5)
WBC # BLD: 7.42 K/UL — SIGNIFICANT CHANGE UP (ref 3.8–10.5)
WBC # BLD: 7.45 K/UL — SIGNIFICANT CHANGE UP (ref 3.8–10.5)
WBC # BLD: 7.45 K/UL — SIGNIFICANT CHANGE UP (ref 3.8–10.5)
WBC # BLD: 7.46 K/UL — SIGNIFICANT CHANGE UP (ref 3.8–10.5)
WBC # BLD: 7.67 K/UL — SIGNIFICANT CHANGE UP (ref 3.8–10.5)
WBC # BLD: 7.86 K/UL — SIGNIFICANT CHANGE UP (ref 3.8–10.5)
WBC # BLD: 8.09 K/UL — SIGNIFICANT CHANGE UP (ref 3.8–10.5)
WBC # BLD: 8.17 K/UL — SIGNIFICANT CHANGE UP (ref 3.8–10.5)
WBC # BLD: 8.23 K/UL — SIGNIFICANT CHANGE UP (ref 3.8–10.5)
WBC # BLD: 8.26 K/UL — SIGNIFICANT CHANGE UP (ref 3.8–10.5)
WBC # BLD: 8.28 K/UL — SIGNIFICANT CHANGE UP (ref 3.8–10.5)
WBC # BLD: 8.39 K/UL — SIGNIFICANT CHANGE UP (ref 3.8–10.5)
WBC # BLD: 8.5 K/UL — SIGNIFICANT CHANGE UP (ref 3.8–10.5)
WBC # BLD: 8.57 K/UL — SIGNIFICANT CHANGE UP (ref 3.8–10.5)
WBC # BLD: 8.62 K/UL — SIGNIFICANT CHANGE UP (ref 3.8–10.5)
WBC # BLD: 8.65 K/UL — SIGNIFICANT CHANGE UP (ref 3.8–10.5)
WBC # BLD: 8.69 K/UL — SIGNIFICANT CHANGE UP (ref 3.8–10.5)
WBC # BLD: 8.73 K/UL — SIGNIFICANT CHANGE UP (ref 3.8–10.5)
WBC # BLD: 8.76 K/UL — SIGNIFICANT CHANGE UP (ref 3.8–10.5)
WBC # BLD: 9.07 K/UL — SIGNIFICANT CHANGE UP (ref 3.8–10.5)
WBC # BLD: 9.11 K/UL — SIGNIFICANT CHANGE UP (ref 3.8–10.5)
WBC # BLD: 9.14 K/UL — SIGNIFICANT CHANGE UP (ref 3.8–10.5)
WBC # BLD: 9.31 K/UL — SIGNIFICANT CHANGE UP (ref 3.8–10.5)
WBC # BLD: 9.48 K/UL — SIGNIFICANT CHANGE UP (ref 3.8–10.5)
WBC # BLD: 9.48 K/UL — SIGNIFICANT CHANGE UP (ref 3.8–10.5)
WBC # BLD: 9.49 K/UL — SIGNIFICANT CHANGE UP (ref 3.8–10.5)
WBC # BLD: 9.65 K/UL — SIGNIFICANT CHANGE UP (ref 3.8–10.5)
WBC # BLD: 9.81 K/UL — SIGNIFICANT CHANGE UP (ref 3.8–10.5)
WBC # FLD AUTO: 10.01 K/UL — SIGNIFICANT CHANGE UP (ref 3.8–10.5)
WBC # FLD AUTO: 10.03 K/UL — SIGNIFICANT CHANGE UP (ref 3.8–10.5)
WBC # FLD AUTO: 10.1 K/UL — SIGNIFICANT CHANGE UP (ref 3.8–10.5)
WBC # FLD AUTO: 10.33 K/UL — SIGNIFICANT CHANGE UP (ref 3.8–10.5)
WBC # FLD AUTO: 10.41 K/UL — SIGNIFICANT CHANGE UP (ref 3.8–10.5)
WBC # FLD AUTO: 10.88 K/UL — HIGH (ref 3.8–10.5)
WBC # FLD AUTO: 11.45 K/UL — HIGH (ref 3.8–10.5)
WBC # FLD AUTO: 12.22 K/UL — HIGH (ref 3.8–10.5)
WBC # FLD AUTO: 12.52 K/UL — HIGH (ref 3.8–10.5)
WBC # FLD AUTO: 12.96 K/UL — HIGH (ref 3.8–10.5)
WBC # FLD AUTO: 13.16 K/UL — HIGH (ref 3.8–10.5)
WBC # FLD AUTO: 13.36 K/UL — HIGH (ref 3.8–10.5)
WBC # FLD AUTO: 13.96 K/UL — HIGH (ref 3.8–10.5)
WBC # FLD AUTO: 14.19 K/UL — HIGH (ref 3.8–10.5)
WBC # FLD AUTO: 14.62 K/UL — HIGH (ref 3.8–10.5)
WBC # FLD AUTO: 16.17 K/UL — HIGH (ref 3.8–10.5)
WBC # FLD AUTO: 16.23 K/UL — HIGH (ref 3.8–10.5)
WBC # FLD AUTO: 16.53 K/UL — HIGH (ref 3.8–10.5)
WBC # FLD AUTO: 18.83 K/UL — HIGH (ref 3.8–10.5)
WBC # FLD AUTO: 19.54 K/UL — HIGH (ref 3.8–10.5)
WBC # FLD AUTO: 21.29 K/UL — HIGH (ref 3.8–10.5)
WBC # FLD AUTO: 6.34 K/UL — SIGNIFICANT CHANGE UP (ref 3.8–10.5)
WBC # FLD AUTO: 6.85 K/UL — SIGNIFICANT CHANGE UP (ref 3.8–10.5)
WBC # FLD AUTO: 6.93 K/UL — SIGNIFICANT CHANGE UP (ref 3.8–10.5)
WBC # FLD AUTO: 7.42 K/UL — SIGNIFICANT CHANGE UP (ref 3.8–10.5)
WBC # FLD AUTO: 7.42 K/UL — SIGNIFICANT CHANGE UP (ref 3.8–10.5)
WBC # FLD AUTO: 7.45 K/UL — SIGNIFICANT CHANGE UP (ref 3.8–10.5)
WBC # FLD AUTO: 7.45 K/UL — SIGNIFICANT CHANGE UP (ref 3.8–10.5)
WBC # FLD AUTO: 7.46 K/UL — SIGNIFICANT CHANGE UP (ref 3.8–10.5)
WBC # FLD AUTO: 7.67 K/UL — SIGNIFICANT CHANGE UP (ref 3.8–10.5)
WBC # FLD AUTO: 7.86 K/UL — SIGNIFICANT CHANGE UP (ref 3.8–10.5)
WBC # FLD AUTO: 8.09 K/UL — SIGNIFICANT CHANGE UP (ref 3.8–10.5)
WBC # FLD AUTO: 8.17 K/UL — SIGNIFICANT CHANGE UP (ref 3.8–10.5)
WBC # FLD AUTO: 8.23 K/UL — SIGNIFICANT CHANGE UP (ref 3.8–10.5)
WBC # FLD AUTO: 8.26 K/UL — SIGNIFICANT CHANGE UP (ref 3.8–10.5)
WBC # FLD AUTO: 8.28 K/UL — SIGNIFICANT CHANGE UP (ref 3.8–10.5)
WBC # FLD AUTO: 8.39 K/UL — SIGNIFICANT CHANGE UP (ref 3.8–10.5)
WBC # FLD AUTO: 8.5 K/UL — SIGNIFICANT CHANGE UP (ref 3.8–10.5)
WBC # FLD AUTO: 8.57 K/UL — SIGNIFICANT CHANGE UP (ref 3.8–10.5)
WBC # FLD AUTO: 8.62 K/UL — SIGNIFICANT CHANGE UP (ref 3.8–10.5)
WBC # FLD AUTO: 8.65 K/UL — SIGNIFICANT CHANGE UP (ref 3.8–10.5)
WBC # FLD AUTO: 8.69 K/UL — SIGNIFICANT CHANGE UP (ref 3.8–10.5)
WBC # FLD AUTO: 8.73 K/UL — SIGNIFICANT CHANGE UP (ref 3.8–10.5)
WBC # FLD AUTO: 8.76 K/UL — SIGNIFICANT CHANGE UP (ref 3.8–10.5)
WBC # FLD AUTO: 9.07 K/UL — SIGNIFICANT CHANGE UP (ref 3.8–10.5)
WBC # FLD AUTO: 9.11 K/UL — SIGNIFICANT CHANGE UP (ref 3.8–10.5)
WBC # FLD AUTO: 9.14 K/UL — SIGNIFICANT CHANGE UP (ref 3.8–10.5)
WBC # FLD AUTO: 9.31 K/UL — SIGNIFICANT CHANGE UP (ref 3.8–10.5)
WBC # FLD AUTO: 9.48 K/UL — SIGNIFICANT CHANGE UP (ref 3.8–10.5)
WBC # FLD AUTO: 9.48 K/UL — SIGNIFICANT CHANGE UP (ref 3.8–10.5)
WBC # FLD AUTO: 9.49 K/UL — SIGNIFICANT CHANGE UP (ref 3.8–10.5)
WBC # FLD AUTO: 9.65 K/UL — SIGNIFICANT CHANGE UP (ref 3.8–10.5)
WBC # FLD AUTO: 9.81 K/UL — SIGNIFICANT CHANGE UP (ref 3.8–10.5)
WBC UR QL: 1 /HPF — SIGNIFICANT CHANGE UP (ref 0–5)
WBC UR QL: 19 /HPF — HIGH (ref 0–5)

## 2022-01-01 PROCEDURE — 85610 PROTHROMBIN TIME: CPT

## 2022-01-01 PROCEDURE — 81001 URINALYSIS AUTO W/SCOPE: CPT

## 2022-01-01 PROCEDURE — 85652 RBC SED RATE AUTOMATED: CPT

## 2022-01-01 PROCEDURE — 82785 ASSAY OF IGE: CPT

## 2022-01-01 PROCEDURE — 99231 SBSQ HOSP IP/OBS SF/LOW 25: CPT

## 2022-01-01 PROCEDURE — 49450 REPLACE G/C TUBE PERC: CPT

## 2022-01-01 PROCEDURE — 82533 TOTAL CORTISOL: CPT

## 2022-01-01 PROCEDURE — 36558 INSERT TUNNELED CV CATH: CPT

## 2022-01-01 PROCEDURE — 36245 INS CATH ABD/L-EXT ART 1ST: CPT | Mod: 59

## 2022-01-01 PROCEDURE — 86901 BLOOD TYPING SEROLOGIC RH(D): CPT

## 2022-01-01 PROCEDURE — C1769: CPT

## 2022-01-01 PROCEDURE — 36907 BALO ANGIOP CTR DIALYSIS SEG: CPT

## 2022-01-01 PROCEDURE — 82803 BLOOD GASES ANY COMBINATION: CPT

## 2022-01-01 PROCEDURE — 93010 ELECTROCARDIOGRAM REPORT: CPT | Mod: 77

## 2022-01-01 PROCEDURE — 80076 HEPATIC FUNCTION PANEL: CPT

## 2022-01-01 PROCEDURE — P9011: CPT

## 2022-01-01 PROCEDURE — 92611 MOTION FLUOROSCOPY/SWALLOW: CPT

## 2022-01-01 PROCEDURE — 87150 DNA/RNA AMPLIFIED PROBE: CPT

## 2022-01-01 PROCEDURE — 99232 SBSQ HOSP IP/OBS MODERATE 35: CPT

## 2022-01-01 PROCEDURE — 99291 CRITICAL CARE FIRST HOUR: CPT

## 2022-01-01 PROCEDURE — 70450 CT HEAD/BRAIN W/O DYE: CPT | Mod: 26

## 2022-01-01 PROCEDURE — 93970 EXTREMITY STUDY: CPT

## 2022-01-01 PROCEDURE — 85384 FIBRINOGEN ACTIVITY: CPT

## 2022-01-01 PROCEDURE — C1750: CPT

## 2022-01-01 PROCEDURE — 92610 EVALUATE SWALLOWING FUNCTION: CPT

## 2022-01-01 PROCEDURE — 82330 ASSAY OF CALCIUM: CPT

## 2022-01-01 PROCEDURE — P9059: CPT

## 2022-01-01 PROCEDURE — 93970 EXTREMITY STUDY: CPT | Mod: 26

## 2022-01-01 PROCEDURE — 85014 HEMATOCRIT: CPT

## 2022-01-01 PROCEDURE — 87493 C DIFF AMPLIFIED PROBE: CPT

## 2022-01-01 PROCEDURE — 85379 FIBRIN DEGRADATION QUANT: CPT

## 2022-01-01 PROCEDURE — 77001 FLUOROGUIDE FOR VEIN DEVICE: CPT | Mod: 26,59

## 2022-01-01 PROCEDURE — P9016: CPT

## 2022-01-01 PROCEDURE — 97110 THERAPEUTIC EXERCISES: CPT

## 2022-01-01 PROCEDURE — U0003: CPT

## 2022-01-01 PROCEDURE — 83550 IRON BINDING TEST: CPT

## 2022-01-01 PROCEDURE — 71045 X-RAY EXAM CHEST 1 VIEW: CPT | Mod: 26

## 2022-01-01 PROCEDURE — 85670 THROMBIN TIME PLASMA: CPT

## 2022-01-01 PROCEDURE — 83036 HEMOGLOBIN GLYCOSYLATED A1C: CPT

## 2022-01-01 PROCEDURE — 74018 RADEX ABDOMEN 1 VIEW: CPT | Mod: 26

## 2022-01-01 PROCEDURE — 36556 INSERT NON-TUNNEL CV CATH: CPT

## 2022-01-01 PROCEDURE — 99233 SBSQ HOSP IP/OBS HIGH 50: CPT

## 2022-01-01 PROCEDURE — 86900 BLOOD TYPING SEROLOGIC ABO: CPT

## 2022-01-01 PROCEDURE — 87641 MR-STAPH DNA AMP PROBE: CPT

## 2022-01-01 PROCEDURE — 36901 INTRO CATH DIALYSIS CIRCUIT: CPT

## 2022-01-01 PROCEDURE — 86769 SARS-COV-2 COVID-19 ANTIBODY: CPT

## 2022-01-01 PROCEDURE — 74176 CT ABD & PELVIS W/O CONTRAST: CPT

## 2022-01-01 PROCEDURE — 97530 THERAPEUTIC ACTIVITIES: CPT

## 2022-01-01 PROCEDURE — 74177 CT ABD & PELVIS W/CONTRAST: CPT

## 2022-01-01 PROCEDURE — 85240 CLOT FACTOR VIII AHG 1 STAGE: CPT

## 2022-01-01 PROCEDURE — 82435 ASSAY OF BLOOD CHLORIDE: CPT

## 2022-01-01 PROCEDURE — 82140 ASSAY OF AMMONIA: CPT

## 2022-01-01 PROCEDURE — 85730 THROMBOPLASTIN TIME PARTIAL: CPT

## 2022-01-01 PROCEDURE — 87186 SC STD MICRODIL/AGAR DIL: CPT

## 2022-01-01 PROCEDURE — C1752: CPT

## 2022-01-01 PROCEDURE — 94640 AIRWAY INHALATION TREATMENT: CPT

## 2022-01-01 PROCEDURE — 87640 STAPH A DNA AMP PROBE: CPT

## 2022-01-01 PROCEDURE — 36248 INS CATH ABD/L-EXT ART ADDL: CPT

## 2022-01-01 PROCEDURE — 87086 URINE CULTURE/COLONY COUNT: CPT

## 2022-01-01 PROCEDURE — 76937 US GUIDE VASCULAR ACCESS: CPT | Mod: 26

## 2022-01-01 PROCEDURE — 82565 ASSAY OF CREATININE: CPT

## 2022-01-01 PROCEDURE — 97116 GAIT TRAINING THERAPY: CPT

## 2022-01-01 PROCEDURE — C1889: CPT

## 2022-01-01 PROCEDURE — 84295 ASSAY OF SERUM SODIUM: CPT

## 2022-01-01 PROCEDURE — 70450 CT HEAD/BRAIN W/O DYE: CPT

## 2022-01-01 PROCEDURE — 85027 COMPLETE CBC AUTOMATED: CPT

## 2022-01-01 PROCEDURE — 93010 ELECTROCARDIOGRAM REPORT: CPT | Mod: 76,77

## 2022-01-01 PROCEDURE — 82787 IGG 1 2 3 OR 4 EACH: CPT

## 2022-01-01 PROCEDURE — 82553 CREATINE MB FRACTION: CPT

## 2022-01-01 PROCEDURE — 86140 C-REACTIVE PROTEIN: CPT

## 2022-01-01 PROCEDURE — 80048 BASIC METABOLIC PNL TOTAL CA: CPT

## 2022-01-01 PROCEDURE — 86803 HEPATITIS C AB TEST: CPT

## 2022-01-01 PROCEDURE — 85245 CLOT FACTOR VIII VW RISTOCTN: CPT

## 2022-01-01 PROCEDURE — 83540 ASSAY OF IRON: CPT

## 2022-01-01 PROCEDURE — 82550 ASSAY OF CK (CPK): CPT

## 2022-01-01 PROCEDURE — 85270 CLOT FACTOR XI PTA: CPT

## 2022-01-01 PROCEDURE — 99498 ADVNCD CARE PLAN ADDL 30 MIN: CPT

## 2022-01-01 PROCEDURE — 86334 IMMUNOFIX E-PHORESIS SERUM: CPT

## 2022-01-01 PROCEDURE — 76775 US EXAM ABDO BACK WALL LIM: CPT

## 2022-01-01 PROCEDURE — 87340 HEPATITIS B SURFACE AG IA: CPT

## 2022-01-01 PROCEDURE — 36247 INS CATH ABD/L-EXT ART 3RD: CPT

## 2022-01-01 PROCEDURE — 80074 ACUTE HEPATITIS PANEL: CPT

## 2022-01-01 PROCEDURE — 82607 VITAMIN B-12: CPT

## 2022-01-01 PROCEDURE — 82272 OCCULT BLD FECES 1-3 TESTS: CPT

## 2022-01-01 PROCEDURE — 86706 HEP B SURFACE ANTIBODY: CPT

## 2022-01-01 PROCEDURE — 84439 ASSAY OF FREE THYROXINE: CPT

## 2022-01-01 PROCEDURE — 87324 CLOSTRIDIUM AG IA: CPT

## 2022-01-01 PROCEDURE — 92526 ORAL FUNCTION THERAPY: CPT

## 2022-01-01 PROCEDURE — 84480 ASSAY TRIIODOTHYRONINE (T3): CPT

## 2022-01-01 PROCEDURE — 84443 ASSAY THYROID STIM HORMONE: CPT

## 2022-01-01 PROCEDURE — 85635 REPTILASE TEST: CPT

## 2022-01-01 PROCEDURE — P9045: CPT

## 2022-01-01 PROCEDURE — 74018 RADEX ABDOMEN 1 VIEW: CPT

## 2022-01-01 PROCEDURE — 83880 ASSAY OF NATRIURETIC PEPTIDE: CPT

## 2022-01-01 PROCEDURE — 83935 ASSAY OF URINE OSMOLALITY: CPT

## 2022-01-01 PROCEDURE — C1760: CPT

## 2022-01-01 PROCEDURE — 86923 COMPATIBILITY TEST ELECTRIC: CPT

## 2022-01-01 PROCEDURE — 49465 FLUORO EXAM OF G/COLON TUBE: CPT

## 2022-01-01 PROCEDURE — 84165 PROTEIN E-PHORESIS SERUM: CPT

## 2022-01-01 PROCEDURE — 82746 ASSAY OF FOLIC ACID SERUM: CPT

## 2022-01-01 PROCEDURE — C1887: CPT

## 2022-01-01 PROCEDURE — 99497 ADVNCD CARE PLAN 30 MIN: CPT

## 2022-01-01 PROCEDURE — 93990 DOPPLER FLOW TESTING: CPT | Mod: 26

## 2022-01-01 PROCEDURE — 86985 SPLIT BLOOD OR PRODUCTS: CPT

## 2022-01-01 PROCEDURE — 86850 RBC ANTIBODY SCREEN: CPT

## 2022-01-01 PROCEDURE — 37244 VASC EMBOLIZE/OCCLUDE BLEED: CPT

## 2022-01-01 PROCEDURE — C8929: CPT

## 2022-01-01 PROCEDURE — 87449 NOS EACH ORGANISM AG IA: CPT

## 2022-01-01 PROCEDURE — 87077 CULTURE AEROBIC IDENTIFY: CPT

## 2022-01-01 PROCEDURE — 73590 X-RAY EXAM OF LOWER LEG: CPT

## 2022-01-01 PROCEDURE — 84156 ASSAY OF PROTEIN URINE: CPT

## 2022-01-01 PROCEDURE — 93005 ELECTROCARDIOGRAM TRACING: CPT

## 2022-01-01 PROCEDURE — 80053 COMPREHEN METABOLIC PANEL: CPT

## 2022-01-01 PROCEDURE — 93971 EXTREMITY STUDY: CPT

## 2022-01-01 PROCEDURE — 76604 US EXAM CHEST: CPT | Mod: 26,GC

## 2022-01-01 PROCEDURE — 94003 VENT MGMT INPAT SUBQ DAY: CPT

## 2022-01-01 PROCEDURE — 99233 SBSQ HOSP IP/OBS HIGH 50: CPT | Mod: GC

## 2022-01-01 PROCEDURE — 82784 ASSAY IGA/IGD/IGG/IGM EACH: CPT

## 2022-01-01 PROCEDURE — 87070 CULTURE OTHR SPECIMN AEROBIC: CPT

## 2022-01-01 PROCEDURE — L8699: CPT

## 2022-01-01 PROCEDURE — 82310 ASSAY OF CALCIUM: CPT

## 2022-01-01 PROCEDURE — 82947 ASSAY GLUCOSE BLOOD QUANT: CPT

## 2022-01-01 PROCEDURE — 86704 HEP B CORE ANTIBODY TOTAL: CPT

## 2022-01-01 PROCEDURE — C1725: CPT

## 2022-01-01 PROCEDURE — 83605 ASSAY OF LACTIC ACID: CPT

## 2022-01-01 PROCEDURE — 82728 ASSAY OF FERRITIN: CPT

## 2022-01-01 PROCEDURE — 85250 CLOT FACTOR IX PTC/CHRSTMAS: CPT

## 2022-01-01 PROCEDURE — 83970 ASSAY OF PARATHORMONE: CPT

## 2022-01-01 PROCEDURE — 82962 GLUCOSE BLOOD TEST: CPT

## 2022-01-01 PROCEDURE — 84100 ASSAY OF PHOSPHORUS: CPT

## 2022-01-01 PROCEDURE — 84132 ASSAY OF SERUM POTASSIUM: CPT

## 2022-01-01 PROCEDURE — 74230 X-RAY XM SWLNG FUNCJ C+: CPT

## 2022-01-01 PROCEDURE — 99261: CPT

## 2022-01-01 PROCEDURE — C1052: CPT

## 2022-01-01 PROCEDURE — 36430 TRANSFUSION BLD/BLD COMPNT: CPT

## 2022-01-01 PROCEDURE — U0005: CPT

## 2022-01-01 PROCEDURE — 85280 CLOT FACTOR XII HAGEMAN: CPT

## 2022-01-01 PROCEDURE — 84484 ASSAY OF TROPONIN QUANT: CPT

## 2022-01-01 PROCEDURE — 84155 ASSAY OF PROTEIN SERUM: CPT

## 2022-01-01 PROCEDURE — 80061 LIPID PANEL: CPT

## 2022-01-01 PROCEDURE — 80202 ASSAY OF VANCOMYCIN: CPT

## 2022-01-01 PROCEDURE — 77001 FLUOROGUIDE FOR VEIN DEVICE: CPT | Mod: 26

## 2022-01-01 PROCEDURE — 82306 VITAMIN D 25 HYDROXY: CPT

## 2022-01-01 PROCEDURE — 84425 ASSAY OF VITAMIN B-1: CPT

## 2022-01-01 PROCEDURE — 85732 THROMBOPLASTIN TIME PARTIAL: CPT

## 2022-01-01 PROCEDURE — 36246 INS CATH ABD/L-EXT ART 2ND: CPT | Mod: 59

## 2022-01-01 PROCEDURE — 76937 US GUIDE VASCULAR ACCESS: CPT

## 2022-01-01 PROCEDURE — 83735 ASSAY OF MAGNESIUM: CPT

## 2022-01-01 PROCEDURE — 85018 HEMOGLOBIN: CPT

## 2022-01-01 PROCEDURE — 86705 HEP B CORE ANTIBODY IGM: CPT

## 2022-01-01 PROCEDURE — 93990 DOPPLER FLOW TESTING: CPT

## 2022-01-01 PROCEDURE — 93010 ELECTROCARDIOGRAM REPORT: CPT

## 2022-01-01 PROCEDURE — 99291 CRITICAL CARE FIRST HOUR: CPT | Mod: 25

## 2022-01-01 PROCEDURE — 36415 COLL VENOUS BLD VENIPUNCTURE: CPT

## 2022-01-01 PROCEDURE — 82570 ASSAY OF URINE CREATININE: CPT

## 2022-01-01 PROCEDURE — 71045 X-RAY EXAM CHEST 1 VIEW: CPT

## 2022-01-01 PROCEDURE — 36556 INSERT NON-TUNNEL CV CATH: CPT | Mod: RT

## 2022-01-01 PROCEDURE — 85025 COMPLETE CBC W/AUTO DIFF WBC: CPT

## 2022-01-01 PROCEDURE — 87040 BLOOD CULTURE FOR BACTERIA: CPT

## 2022-01-01 PROCEDURE — C1894: CPT

## 2022-01-01 PROCEDURE — 77001 FLUOROGUIDE FOR VEIN DEVICE: CPT | Mod: 59

## 2022-01-01 PROCEDURE — P9047: CPT

## 2022-01-01 DEVICE — HEMOSPRAY HEMOSTAT ENDO 7F: Type: IMPLANTABLE DEVICE | Status: FUNCTIONAL

## 2022-01-01 DEVICE — KIT ENDO SAFTEY PEG PULL STD 20 FR ENDOVIVE: Type: IMPLANTABLE DEVICE | Status: FUNCTIONAL

## 2022-01-01 RX ORDER — POTASSIUM CHLORIDE 20 MEQ
40 PACKET (EA) ORAL ONCE
Refills: 0 | Status: COMPLETED | OUTPATIENT
Start: 2022-01-01 | End: 2022-01-01

## 2022-01-01 RX ORDER — ACETAMINOPHEN 500 MG
1000 TABLET ORAL ONCE
Refills: 0 | Status: DISCONTINUED | OUTPATIENT
Start: 2022-01-01 | End: 2022-01-01

## 2022-01-01 RX ORDER — BUMETANIDE 0.25 MG/ML
2 INJECTION INTRAMUSCULAR; INTRAVENOUS ONCE
Refills: 0 | Status: COMPLETED | OUTPATIENT
Start: 2022-01-01 | End: 2022-01-01

## 2022-01-01 RX ORDER — LEVOTHYROXINE SODIUM 125 MCG
25 TABLET ORAL DAILY
Refills: 0 | Status: DISCONTINUED | OUTPATIENT
Start: 2022-01-01 | End: 2022-01-01

## 2022-01-01 RX ORDER — HYDROMORPHONE HYDROCHLORIDE 2 MG/ML
0.5 INJECTION INTRAMUSCULAR; INTRAVENOUS; SUBCUTANEOUS EVERY 6 HOURS
Refills: 0 | Status: DISCONTINUED | OUTPATIENT
Start: 2022-01-01 | End: 2022-01-01

## 2022-01-01 RX ORDER — NOREPINEPHRINE BITARTRATE/D5W 8 MG/250ML
0.08 PLASTIC BAG, INJECTION (ML) INTRAVENOUS
Qty: 8 | Refills: 0 | Status: DISCONTINUED | OUTPATIENT
Start: 2022-01-01 | End: 2022-01-01

## 2022-01-01 RX ORDER — PIPERACILLIN AND TAZOBACTAM 4; .5 G/20ML; G/20ML
3.38 INJECTION, POWDER, LYOPHILIZED, FOR SOLUTION INTRAVENOUS EVERY 12 HOURS
Refills: 0 | Status: DISCONTINUED | OUTPATIENT
Start: 2022-01-01 | End: 2022-01-01

## 2022-01-01 RX ORDER — DEXTROSE 50 % IN WATER 50 %
25 SYRINGE (ML) INTRAVENOUS ONCE
Refills: 0 | Status: ACTIVE | OUTPATIENT
Start: 2022-01-01

## 2022-01-01 RX ORDER — PROPOFOL 10 MG/ML
20 INJECTION, EMULSION INTRAVENOUS
Qty: 1000 | Refills: 0 | Status: DISCONTINUED | OUTPATIENT
Start: 2022-01-01 | End: 2022-01-01

## 2022-01-01 RX ORDER — ALBUMIN HUMAN 25 %
250 VIAL (ML) INTRAVENOUS ONCE
Refills: 0 | Status: COMPLETED | OUTPATIENT
Start: 2022-01-01 | End: 2022-01-01

## 2022-01-01 RX ORDER — FENTANYL CITRATE 50 UG/ML
25 INJECTION INTRAVENOUS ONCE
Refills: 0 | Status: DISCONTINUED | OUTPATIENT
Start: 2022-01-01 | End: 2022-01-01

## 2022-01-01 RX ORDER — DIVALPROEX SODIUM 500 MG/1
250 TABLET, DELAYED RELEASE ORAL THREE TIMES A DAY
Refills: 0 | Status: DISCONTINUED | OUTPATIENT
Start: 2022-01-01 | End: 2022-01-01

## 2022-01-01 RX ORDER — ONDANSETRON 8 MG/1
4 TABLET, FILM COATED ORAL ONCE
Refills: 0 | Status: DISCONTINUED | OUTPATIENT
Start: 2022-01-01 | End: 2022-01-01

## 2022-01-01 RX ORDER — METOPROLOL TARTRATE 50 MG
5 TABLET ORAL EVERY 6 HOURS
Refills: 0 | Status: DISCONTINUED | OUTPATIENT
Start: 2022-01-01 | End: 2022-01-01

## 2022-01-01 RX ORDER — DEXTROSE 50 % IN WATER 50 %
12.5 SYRINGE (ML) INTRAVENOUS ONCE
Refills: 0 | Status: COMPLETED | OUTPATIENT
Start: 2022-01-01 | End: 2022-01-01

## 2022-01-01 RX ORDER — PIPERACILLIN AND TAZOBACTAM 4; .5 G/20ML; G/20ML
3.38 INJECTION, POWDER, LYOPHILIZED, FOR SOLUTION INTRAVENOUS ONCE
Refills: 0 | Status: COMPLETED | OUTPATIENT
Start: 2022-01-01 | End: 2022-01-01

## 2022-01-01 RX ORDER — ERYTHROPOIETIN 10000 [IU]/ML
4000 INJECTION, SOLUTION INTRAVENOUS; SUBCUTANEOUS ONCE
Refills: 0 | Status: COMPLETED | OUTPATIENT
Start: 2022-01-01 | End: 2022-01-01

## 2022-01-01 RX ORDER — MIRTAZAPINE 45 MG/1
7.5 TABLET, ORALLY DISINTEGRATING ORAL DAILY
Refills: 0 | Status: DISCONTINUED | OUTPATIENT
Start: 2022-01-01 | End: 2022-01-01

## 2022-01-01 RX ORDER — ERYTHROPOIETIN 10000 [IU]/ML
12000 INJECTION, SOLUTION INTRAVENOUS; SUBCUTANEOUS ONCE
Refills: 0 | Status: COMPLETED | OUTPATIENT
Start: 2022-01-01 | End: 2022-01-01

## 2022-01-01 RX ORDER — SODIUM CHLORIDE 9 MG/ML
500 INJECTION, SOLUTION INTRAVENOUS ONCE
Refills: 0 | Status: COMPLETED | OUTPATIENT
Start: 2022-01-01 | End: 2022-01-01

## 2022-01-01 RX ORDER — MIDODRINE HYDROCHLORIDE 2.5 MG/1
10 TABLET ORAL
Refills: 0 | Status: DISCONTINUED | OUTPATIENT
Start: 2022-01-01 | End: 2022-01-01

## 2022-01-01 RX ORDER — ERYTHROPOIETIN 10000 [IU]/ML
10000 INJECTION, SOLUTION INTRAVENOUS; SUBCUTANEOUS ONCE
Refills: 0 | Status: COMPLETED | OUTPATIENT
Start: 2022-01-01 | End: 2022-01-01

## 2022-01-01 RX ORDER — LEVOTHYROXINE SODIUM 125 MCG
20 TABLET ORAL AT BEDTIME
Refills: 0 | Status: DISCONTINUED | OUTPATIENT
Start: 2022-01-01 | End: 2022-01-01

## 2022-01-01 RX ORDER — MAGNESIUM SULFATE 500 MG/ML
1 VIAL (ML) INJECTION ONCE
Refills: 0 | Status: COMPLETED | OUTPATIENT
Start: 2022-01-01 | End: 2022-01-01

## 2022-01-01 RX ORDER — ACETAMINOPHEN 500 MG
650 TABLET ORAL EVERY 6 HOURS
Refills: 0 | Status: DISCONTINUED | OUTPATIENT
Start: 2022-01-01 | End: 2022-01-01

## 2022-01-01 RX ORDER — ROBINUL 0.2 MG/ML
0.4 INJECTION INTRAMUSCULAR; INTRAVENOUS EVERY 6 HOURS
Refills: 0 | Status: DISCONTINUED | OUTPATIENT
Start: 2022-01-01 | End: 2022-01-01

## 2022-01-01 RX ORDER — ALBUMIN HUMAN 25 %
50 VIAL (ML) INTRAVENOUS EVERY 6 HOURS
Refills: 0 | Status: COMPLETED | OUTPATIENT
Start: 2022-01-01 | End: 2022-01-01

## 2022-01-01 RX ORDER — QUETIAPINE FUMARATE 200 MG/1
25 TABLET, FILM COATED ORAL AT BEDTIME
Refills: 0 | Status: DISCONTINUED | OUTPATIENT
Start: 2022-01-01 | End: 2022-01-01

## 2022-01-01 RX ORDER — ATORVASTATIN CALCIUM 80 MG/1
80 TABLET, FILM COATED ORAL AT BEDTIME
Refills: 0 | Status: DISCONTINUED | OUTPATIENT
Start: 2022-01-01 | End: 2022-01-01

## 2022-01-01 RX ORDER — PANTOPRAZOLE SODIUM 20 MG/1
8 TABLET, DELAYED RELEASE ORAL
Qty: 80 | Refills: 0 | Status: DISCONTINUED | OUTPATIENT
Start: 2022-01-01 | End: 2022-01-01

## 2022-01-01 RX ORDER — METOPROLOL TARTRATE 50 MG
10 TABLET ORAL EVERY 6 HOURS
Refills: 0 | Status: DISCONTINUED | OUTPATIENT
Start: 2022-01-01 | End: 2022-01-01

## 2022-01-01 RX ORDER — MEMANTINE HYDROCHLORIDE 10 MG/1
5 TABLET ORAL AT BEDTIME
Refills: 0 | Status: DISCONTINUED | OUTPATIENT
Start: 2022-01-01 | End: 2022-01-01

## 2022-01-01 RX ORDER — CHLORHEXIDINE GLUCONATE 213 G/1000ML
1 SOLUTION TOPICAL
Refills: 0 | Status: DISCONTINUED | OUTPATIENT
Start: 2022-01-01 | End: 2022-01-01

## 2022-01-01 RX ORDER — NOREPINEPHRINE BITARTRATE/D5W 8 MG/250ML
0.05 PLASTIC BAG, INJECTION (ML) INTRAVENOUS
Qty: 8 | Refills: 0 | Status: DISCONTINUED | OUTPATIENT
Start: 2022-01-01 | End: 2022-01-01

## 2022-01-01 RX ORDER — INSULIN LISPRO 100/ML
VIAL (ML) SUBCUTANEOUS EVERY 6 HOURS
Refills: 0 | Status: DISCONTINUED | OUTPATIENT
Start: 2022-01-01 | End: 2022-01-01

## 2022-01-01 RX ORDER — PANTOPRAZOLE SODIUM 20 MG/1
40 TABLET, DELAYED RELEASE ORAL
Refills: 0 | Status: DISCONTINUED | OUTPATIENT
Start: 2022-01-01 | End: 2022-01-01

## 2022-01-01 RX ORDER — DEXMEDETOMIDINE HYDROCHLORIDE IN 0.9% SODIUM CHLORIDE 4 UG/ML
0.4 INJECTION INTRAVENOUS
Qty: 200 | Refills: 0 | Status: DISCONTINUED | OUTPATIENT
Start: 2022-01-01 | End: 2022-01-01

## 2022-01-01 RX ORDER — HYDROMORPHONE HYDROCHLORIDE 2 MG/ML
0.2 INJECTION INTRAMUSCULAR; INTRAVENOUS; SUBCUTANEOUS
Refills: 0 | Status: DISCONTINUED | OUTPATIENT
Start: 2022-01-01 | End: 2022-01-01

## 2022-01-01 RX ORDER — SODIUM CHLORIDE 9 MG/ML
1000 INJECTION, SOLUTION INTRAVENOUS
Refills: 0 | Status: DISCONTINUED | OUTPATIENT
Start: 2022-01-01 | End: 2022-01-01

## 2022-01-01 RX ORDER — CARBIDOPA AND LEVODOPA 25; 100 MG/1; MG/1
2 TABLET ORAL
Refills: 0 | Status: DISCONTINUED | OUTPATIENT
Start: 2022-01-01 | End: 2022-01-01

## 2022-01-01 RX ORDER — SODIUM,POTASSIUM PHOSPHATES 278-250MG
1 POWDER IN PACKET (EA) ORAL THREE TIMES A DAY
Refills: 0 | Status: COMPLETED | OUTPATIENT
Start: 2022-01-01 | End: 2022-01-01

## 2022-01-01 RX ORDER — TAMSULOSIN HYDROCHLORIDE 0.4 MG/1
0.4 CAPSULE ORAL AT BEDTIME
Refills: 0 | Status: DISCONTINUED | OUTPATIENT
Start: 2022-01-01 | End: 2022-01-01

## 2022-01-01 RX ORDER — FOLIC ACID 0.8 MG
1 TABLET ORAL DAILY
Refills: 0 | Status: DISCONTINUED | OUTPATIENT
Start: 2022-01-01 | End: 2022-01-01

## 2022-01-01 RX ORDER — SODIUM CHLORIDE 9 MG/ML
100 INJECTION INTRAMUSCULAR; INTRAVENOUS; SUBCUTANEOUS
Refills: 0 | Status: COMPLETED | OUTPATIENT
Start: 2022-01-01 | End: 2022-01-01

## 2022-01-01 RX ORDER — ONDANSETRON 8 MG/1
4 TABLET, FILM COATED ORAL EVERY 6 HOURS
Refills: 0 | Status: DISCONTINUED | OUTPATIENT
Start: 2022-01-01 | End: 2022-01-01

## 2022-01-01 RX ORDER — LEVOTHYROXINE SODIUM 125 MCG
25 TABLET ORAL AT BEDTIME
Refills: 0 | Status: DISCONTINUED | OUTPATIENT
Start: 2022-01-01 | End: 2022-01-01

## 2022-01-01 RX ORDER — LEVOTHYROXINE SODIUM 125 MCG
30 TABLET ORAL AT BEDTIME
Refills: 0 | Status: DISCONTINUED | OUTPATIENT
Start: 2022-01-01 | End: 2022-01-01

## 2022-01-01 RX ORDER — PIPERACILLIN AND TAZOBACTAM 4; .5 G/20ML; G/20ML
3.38 INJECTION, POWDER, LYOPHILIZED, FOR SOLUTION INTRAVENOUS ONCE
Refills: 0 | Status: DISCONTINUED | OUTPATIENT
Start: 2022-01-01 | End: 2022-01-01

## 2022-01-01 RX ORDER — TRIHEXYPHENIDYL HCL 2 MG
2 TABLET ORAL THREE TIMES A DAY
Refills: 0 | Status: DISCONTINUED | OUTPATIENT
Start: 2022-01-01 | End: 2022-01-01

## 2022-01-01 RX ORDER — MAGNESIUM SULFATE 500 MG/ML
2 VIAL (ML) INJECTION ONCE
Refills: 0 | Status: DISCONTINUED | OUTPATIENT
Start: 2022-01-01 | End: 2022-01-01

## 2022-01-01 RX ORDER — CARBIDOPA AND LEVODOPA 25; 100 MG/1; MG/1
2.5 TABLET ORAL
Refills: 0 | Status: DISCONTINUED | OUTPATIENT
Start: 2022-01-01 | End: 2022-01-01

## 2022-01-01 RX ORDER — MIDODRINE HYDROCHLORIDE 2.5 MG/1
15 TABLET ORAL EVERY 8 HOURS
Refills: 0 | Status: DISCONTINUED | OUTPATIENT
Start: 2022-01-01 | End: 2022-01-01

## 2022-01-01 RX ORDER — FUROSEMIDE 40 MG
80 TABLET ORAL ONCE
Refills: 0 | Status: DISCONTINUED | OUTPATIENT
Start: 2022-01-01 | End: 2022-01-01

## 2022-01-01 RX ORDER — CINACALCET 30 MG/1
60 TABLET, FILM COATED ORAL DAILY
Refills: 0 | Status: DISCONTINUED | OUTPATIENT
Start: 2022-01-01 | End: 2022-01-01

## 2022-01-01 RX ORDER — DEXTROSE 10 % IN WATER 10 %
1000 INTRAVENOUS SOLUTION INTRAVENOUS
Refills: 0 | Status: DISCONTINUED | OUTPATIENT
Start: 2022-01-01 | End: 2022-01-01

## 2022-01-01 RX ORDER — DOXAZOSIN MESYLATE 4 MG
1 TABLET ORAL AT BEDTIME
Refills: 0 | Status: DISCONTINUED | OUTPATIENT
Start: 2022-01-01 | End: 2022-01-01

## 2022-01-01 RX ORDER — HYDROMORPHONE HYDROCHLORIDE 2 MG/ML
1 INJECTION INTRAMUSCULAR; INTRAVENOUS; SUBCUTANEOUS
Refills: 0 | Status: DISCONTINUED | OUTPATIENT
Start: 2022-01-01 | End: 2022-01-01

## 2022-01-01 RX ORDER — CHLORHEXIDINE GLUCONATE 213 G/1000ML
15 SOLUTION TOPICAL EVERY 12 HOURS
Refills: 0 | Status: DISCONTINUED | OUTPATIENT
Start: 2022-01-01 | End: 2022-01-01

## 2022-01-01 RX ORDER — HYDROMORPHONE HYDROCHLORIDE 2 MG/ML
0.5 INJECTION INTRAMUSCULAR; INTRAVENOUS; SUBCUTANEOUS
Refills: 0 | Status: DISCONTINUED | OUTPATIENT
Start: 2022-01-01 | End: 2022-01-01

## 2022-01-01 RX ORDER — VANCOMYCIN HCL 1 G
125 VIAL (EA) INTRAVENOUS EVERY 6 HOURS
Refills: 0 | Status: COMPLETED | OUTPATIENT
Start: 2022-01-01 | End: 2022-01-01

## 2022-01-01 RX ORDER — QUETIAPINE FUMARATE 200 MG/1
50 TABLET, FILM COATED ORAL AT BEDTIME
Refills: 0 | Status: DISCONTINUED | OUTPATIENT
Start: 2022-01-01 | End: 2022-01-01

## 2022-01-01 RX ORDER — DEXTROSE 50 % IN WATER 50 %
25 SYRINGE (ML) INTRAVENOUS ONCE
Refills: 0 | Status: COMPLETED | OUTPATIENT
Start: 2022-01-01 | End: 2022-01-01

## 2022-01-01 RX ORDER — INSULIN LISPRO 100/ML
VIAL (ML) SUBCUTANEOUS
Refills: 0 | Status: DISCONTINUED | OUTPATIENT
Start: 2022-01-01 | End: 2022-01-01

## 2022-01-01 RX ORDER — PANTOPRAZOLE SODIUM 20 MG/1
40 TABLET, DELAYED RELEASE ORAL EVERY 12 HOURS
Refills: 0 | Status: DISCONTINUED | OUTPATIENT
Start: 2022-01-01 | End: 2022-01-01

## 2022-01-01 RX ORDER — ACETAMINOPHEN 500 MG
650 TABLET ORAL ONCE
Refills: 0 | Status: COMPLETED | OUTPATIENT
Start: 2022-01-01 | End: 2022-01-01

## 2022-01-01 RX ORDER — FUROSEMIDE 40 MG
80 TABLET ORAL ONCE
Refills: 0 | Status: COMPLETED | OUTPATIENT
Start: 2022-01-01 | End: 2022-01-01

## 2022-01-01 RX ORDER — METOPROLOL TARTRATE 50 MG
25 TABLET ORAL
Refills: 0 | Status: DISCONTINUED | OUTPATIENT
Start: 2022-01-01 | End: 2022-01-01

## 2022-01-01 RX ORDER — LEVOTHYROXINE SODIUM 125 MCG
12.5 TABLET ORAL
Refills: 0 | Status: DISCONTINUED | OUTPATIENT
Start: 2022-01-01 | End: 2022-01-01

## 2022-01-01 RX ORDER — MIDODRINE HYDROCHLORIDE 2.5 MG/1
30 TABLET ORAL EVERY 8 HOURS
Refills: 0 | Status: DISCONTINUED | OUTPATIENT
Start: 2022-01-01 | End: 2022-01-01

## 2022-01-01 RX ORDER — DULOXETINE HYDROCHLORIDE 30 MG/1
20 CAPSULE, DELAYED RELEASE ORAL DAILY
Refills: 0 | Status: DISCONTINUED | OUTPATIENT
Start: 2022-01-01 | End: 2022-01-01

## 2022-01-01 RX ORDER — HYDROMORPHONE HYDROCHLORIDE 2 MG/ML
1 INJECTION INTRAMUSCULAR; INTRAVENOUS; SUBCUTANEOUS EVERY 6 HOURS
Refills: 0 | Status: DISCONTINUED | OUTPATIENT
Start: 2022-01-01 | End: 2022-01-01

## 2022-01-01 RX ORDER — QUETIAPINE FUMARATE 200 MG/1
25 TABLET, FILM COATED ORAL
Refills: 0 | Status: DISCONTINUED | OUTPATIENT
Start: 2022-01-01 | End: 2022-01-01

## 2022-01-01 RX ORDER — POTASSIUM CHLORIDE 20 MEQ
10 PACKET (EA) ORAL ONCE
Refills: 0 | Status: COMPLETED | OUTPATIENT
Start: 2022-01-01 | End: 2022-01-01

## 2022-01-01 RX ORDER — POTASSIUM CHLORIDE 20 MEQ
10 PACKET (EA) ORAL
Refills: 0 | Status: DISCONTINUED | OUTPATIENT
Start: 2022-01-01 | End: 2022-01-01

## 2022-01-01 RX ORDER — QUETIAPINE FUMARATE 200 MG/1
25 TABLET, FILM COATED ORAL THREE TIMES A DAY
Refills: 0 | Status: DISCONTINUED | OUTPATIENT
Start: 2022-01-01 | End: 2022-01-01

## 2022-01-01 RX ORDER — SODIUM CHLORIDE 9 MG/ML
1000 INJECTION INTRAMUSCULAR; INTRAVENOUS; SUBCUTANEOUS
Refills: 0 | Status: DISCONTINUED | OUTPATIENT
Start: 2022-01-01 | End: 2022-01-01

## 2022-01-01 RX ORDER — MIDODRINE HYDROCHLORIDE 2.5 MG/1
20 TABLET ORAL EVERY 8 HOURS
Refills: 0 | Status: DISCONTINUED | OUTPATIENT
Start: 2022-01-01 | End: 2022-01-01

## 2022-01-01 RX ORDER — INSULIN GLARGINE 100 [IU]/ML
6 INJECTION, SOLUTION SUBCUTANEOUS AT BEDTIME
Refills: 0 | Status: DISCONTINUED | OUTPATIENT
Start: 2022-01-01 | End: 2022-01-01

## 2022-01-01 RX ORDER — VALPROIC ACID (AS SODIUM SALT) 250 MG/5ML
100 SOLUTION, ORAL ORAL
Refills: 0 | Status: DISCONTINUED | OUTPATIENT
Start: 2022-01-01 | End: 2022-01-01

## 2022-01-01 RX ORDER — MIDODRINE HYDROCHLORIDE 2.5 MG/1
10 TABLET ORAL EVERY 8 HOURS
Refills: 0 | Status: DISCONTINUED | OUTPATIENT
Start: 2022-01-01 | End: 2022-01-01

## 2022-01-01 RX ORDER — ERYTHROPOIETIN 10000 [IU]/ML
14000 INJECTION, SOLUTION INTRAVENOUS; SUBCUTANEOUS ONCE
Refills: 0 | Status: COMPLETED | OUTPATIENT
Start: 2022-01-01 | End: 2022-01-01

## 2022-01-01 RX ORDER — MUPIROCIN 20 MG/G
1 OINTMENT TOPICAL
Refills: 0 | Status: DISCONTINUED | OUTPATIENT
Start: 2022-01-01 | End: 2022-01-01

## 2022-01-01 RX ORDER — PIPERACILLIN AND TAZOBACTAM 4; .5 G/20ML; G/20ML
3.38 INJECTION, POWDER, LYOPHILIZED, FOR SOLUTION INTRAVENOUS EVERY 8 HOURS
Refills: 0 | Status: DISCONTINUED | OUTPATIENT
Start: 2022-01-01 | End: 2022-01-01

## 2022-01-01 RX ORDER — FUROSEMIDE 40 MG
60 TABLET ORAL ONCE
Refills: 0 | Status: COMPLETED | OUTPATIENT
Start: 2022-01-01 | End: 2022-01-01

## 2022-01-01 RX ORDER — INSULIN DETEMIR 100/ML (3)
4 INSULIN PEN (ML) SUBCUTANEOUS
Refills: 0 | Status: DISCONTINUED | OUTPATIENT
Start: 2022-01-01 | End: 2022-01-01

## 2022-01-01 RX ORDER — ACETAMINOPHEN 500 MG
1000 TABLET ORAL ONCE
Refills: 0 | Status: COMPLETED | OUTPATIENT
Start: 2022-01-01 | End: 2022-01-01

## 2022-01-01 RX ADMIN — Medication 125 MILLIGRAM(S): at 18:38

## 2022-01-01 RX ADMIN — HEPARIN SODIUM 5000 UNIT(S): 5000 INJECTION INTRAVENOUS; SUBCUTANEOUS at 05:24

## 2022-01-01 RX ADMIN — Medication 100 GRAM(S): at 04:19

## 2022-01-01 RX ADMIN — Medication 2 MILLIGRAM(S): at 21:03

## 2022-01-01 RX ADMIN — Medication 5 MILLIGRAM(S): at 17:25

## 2022-01-01 RX ADMIN — Medication 125 MILLIGRAM(S): at 13:43

## 2022-01-01 RX ADMIN — Medication 5 MILLIGRAM(S): at 11:32

## 2022-01-01 RX ADMIN — DIVALPROEX SODIUM 250 MILLIGRAM(S): 500 TABLET, DELAYED RELEASE ORAL at 12:45

## 2022-01-01 RX ADMIN — CARBIDOPA AND LEVODOPA 2.5 TABLET(S): 25; 100 TABLET ORAL at 13:30

## 2022-01-01 RX ADMIN — MEMANTINE HYDROCHLORIDE 5 MILLIGRAM(S): 10 TABLET ORAL at 21:34

## 2022-01-01 RX ADMIN — HEPARIN SODIUM 5000 UNIT(S): 5000 INJECTION INTRAVENOUS; SUBCUTANEOUS at 06:41

## 2022-01-01 RX ADMIN — CHLORHEXIDINE GLUCONATE 1 APPLICATION(S): 213 SOLUTION TOPICAL at 05:00

## 2022-01-01 RX ADMIN — Medication 1 MILLIGRAM(S): at 16:50

## 2022-01-01 RX ADMIN — CHLORHEXIDINE GLUCONATE 1 APPLICATION(S): 213 SOLUTION TOPICAL at 05:28

## 2022-01-01 RX ADMIN — Medication 25 GRAM(S): at 17:16

## 2022-01-01 RX ADMIN — CARBIDOPA AND LEVODOPA 2 TABLET(S): 25; 100 TABLET ORAL at 21:41

## 2022-01-01 RX ADMIN — CHLORHEXIDINE GLUCONATE 1 APPLICATION(S): 213 SOLUTION TOPICAL at 05:13

## 2022-01-01 RX ADMIN — PANTOPRAZOLE SODIUM 40 MILLIGRAM(S): 20 TABLET, DELAYED RELEASE ORAL at 06:22

## 2022-01-01 RX ADMIN — MIRTAZAPINE 7.5 MILLIGRAM(S): 45 TABLET, ORALLY DISINTEGRATING ORAL at 11:31

## 2022-01-01 RX ADMIN — HEPARIN SODIUM 5000 UNIT(S): 5000 INJECTION INTRAVENOUS; SUBCUTANEOUS at 06:15

## 2022-01-01 RX ADMIN — DIVALPROEX SODIUM 250 MILLIGRAM(S): 500 TABLET, DELAYED RELEASE ORAL at 13:50

## 2022-01-01 RX ADMIN — SODIUM CHLORIDE 40 MILLILITER(S): 9 INJECTION, SOLUTION INTRAVENOUS at 12:16

## 2022-01-01 RX ADMIN — MIRTAZAPINE 7.5 MILLIGRAM(S): 45 TABLET, ORALLY DISINTEGRATING ORAL at 11:33

## 2022-01-01 RX ADMIN — QUETIAPINE FUMARATE 50 MILLIGRAM(S): 200 TABLET, FILM COATED ORAL at 22:12

## 2022-01-01 RX ADMIN — ROBINUL 0.4 MILLIGRAM(S): 0.2 INJECTION INTRAMUSCULAR; INTRAVENOUS at 11:17

## 2022-01-01 RX ADMIN — DIVALPROEX SODIUM 250 MILLIGRAM(S): 500 TABLET, DELAYED RELEASE ORAL at 05:28

## 2022-01-01 RX ADMIN — Medication 5 MILLIGRAM(S): at 13:51

## 2022-01-01 RX ADMIN — Medication 50 MILLIGRAM(S): at 12:18

## 2022-01-01 RX ADMIN — PANTOPRAZOLE SODIUM 40 MILLIGRAM(S): 20 TABLET, DELAYED RELEASE ORAL at 18:18

## 2022-01-01 RX ADMIN — DIVALPROEX SODIUM 250 MILLIGRAM(S): 500 TABLET, DELAYED RELEASE ORAL at 15:05

## 2022-01-01 RX ADMIN — Medication 5 MILLIGRAM(S): at 23:46

## 2022-01-01 RX ADMIN — CARBIDOPA AND LEVODOPA 2.5 TABLET(S): 25; 100 TABLET ORAL at 06:07

## 2022-01-01 RX ADMIN — PANTOPRAZOLE SODIUM 40 MILLIGRAM(S): 20 TABLET, DELAYED RELEASE ORAL at 05:44

## 2022-01-01 RX ADMIN — ERYTHROPOIETIN 10000 UNIT(S): 10000 INJECTION, SOLUTION INTRAVENOUS; SUBCUTANEOUS at 10:44

## 2022-01-01 RX ADMIN — Medication 125 MILLIGRAM(S): at 11:18

## 2022-01-01 RX ADMIN — CARBIDOPA AND LEVODOPA 2.5 TABLET(S): 25; 100 TABLET ORAL at 13:12

## 2022-01-01 RX ADMIN — QUETIAPINE FUMARATE 25 MILLIGRAM(S): 200 TABLET, FILM COATED ORAL at 14:31

## 2022-01-01 RX ADMIN — CARBIDOPA AND LEVODOPA 2 TABLET(S): 25; 100 TABLET ORAL at 00:29

## 2022-01-01 RX ADMIN — DULOXETINE HYDROCHLORIDE 20 MILLIGRAM(S): 30 CAPSULE, DELAYED RELEASE ORAL at 13:27

## 2022-01-01 RX ADMIN — QUETIAPINE FUMARATE 25 MILLIGRAM(S): 200 TABLET, FILM COATED ORAL at 21:50

## 2022-01-01 RX ADMIN — Medication 25 MICROGRAM(S): at 05:26

## 2022-01-01 RX ADMIN — ROBINUL 0.4 MILLIGRAM(S): 0.2 INJECTION INTRAMUSCULAR; INTRAVENOUS at 23:07

## 2022-01-01 RX ADMIN — PIPERACILLIN AND TAZOBACTAM 25 GRAM(S): 4; .5 INJECTION, POWDER, LYOPHILIZED, FOR SOLUTION INTRAVENOUS at 04:09

## 2022-01-01 RX ADMIN — DIVALPROEX SODIUM 250 MILLIGRAM(S): 500 TABLET, DELAYED RELEASE ORAL at 05:35

## 2022-01-01 RX ADMIN — ATORVASTATIN CALCIUM 80 MILLIGRAM(S): 80 TABLET, FILM COATED ORAL at 21:54

## 2022-01-01 RX ADMIN — CARBIDOPA AND LEVODOPA 2.5 TABLET(S): 25; 100 TABLET ORAL at 11:48

## 2022-01-01 RX ADMIN — MIRTAZAPINE 7.5 MILLIGRAM(S): 45 TABLET, ORALLY DISINTEGRATING ORAL at 12:19

## 2022-01-01 RX ADMIN — Medication 50 MILLIGRAM(S): at 22:39

## 2022-01-01 RX ADMIN — ATORVASTATIN CALCIUM 80 MILLIGRAM(S): 80 TABLET, FILM COATED ORAL at 00:04

## 2022-01-01 RX ADMIN — MEMANTINE HYDROCHLORIDE 5 MILLIGRAM(S): 10 TABLET ORAL at 21:08

## 2022-01-01 RX ADMIN — Medication 5 MILLIGRAM(S): at 18:47

## 2022-01-01 RX ADMIN — ATORVASTATIN CALCIUM 80 MILLIGRAM(S): 80 TABLET, FILM COATED ORAL at 22:06

## 2022-01-01 RX ADMIN — POLYETHYLENE GLYCOL 3350 17 GRAM(S): 17 POWDER, FOR SOLUTION ORAL at 06:09

## 2022-01-01 RX ADMIN — DULOXETINE HYDROCHLORIDE 20 MILLIGRAM(S): 30 CAPSULE, DELAYED RELEASE ORAL at 12:34

## 2022-01-01 RX ADMIN — Medication 50 MILLILITER(S): at 05:09

## 2022-01-01 RX ADMIN — CHLORHEXIDINE GLUCONATE 1 APPLICATION(S): 213 SOLUTION TOPICAL at 05:26

## 2022-01-01 RX ADMIN — HYDROMORPHONE HYDROCHLORIDE 1 MILLIGRAM(S): 2 INJECTION INTRAMUSCULAR; INTRAVENOUS; SUBCUTANEOUS at 11:17

## 2022-01-01 RX ADMIN — TAMSULOSIN HYDROCHLORIDE 0.4 MILLIGRAM(S): 0.4 CAPSULE ORAL at 21:41

## 2022-01-01 RX ADMIN — DIVALPROEX SODIUM 250 MILLIGRAM(S): 500 TABLET, DELAYED RELEASE ORAL at 18:28

## 2022-01-01 RX ADMIN — CHLORHEXIDINE GLUCONATE 1 APPLICATION(S): 213 SOLUTION TOPICAL at 06:40

## 2022-01-01 RX ADMIN — MIDODRINE HYDROCHLORIDE 20 MILLIGRAM(S): 2.5 TABLET ORAL at 02:54

## 2022-01-01 RX ADMIN — Medication 25 MICROGRAM(S): at 05:23

## 2022-01-01 RX ADMIN — ATORVASTATIN CALCIUM 80 MILLIGRAM(S): 80 TABLET, FILM COATED ORAL at 21:43

## 2022-01-01 RX ADMIN — PANTOPRAZOLE SODIUM 40 MILLIGRAM(S): 20 TABLET, DELAYED RELEASE ORAL at 05:46

## 2022-01-01 RX ADMIN — Medication 50 MILLIGRAM(S): at 05:40

## 2022-01-01 RX ADMIN — CARBIDOPA AND LEVODOPA 2 TABLET(S): 25; 100 TABLET ORAL at 21:26

## 2022-01-01 RX ADMIN — SODIUM CHLORIDE 20 MILLILITER(S): 9 INJECTION, SOLUTION INTRAVENOUS at 09:15

## 2022-01-01 RX ADMIN — MEMANTINE HYDROCHLORIDE 5 MILLIGRAM(S): 10 TABLET ORAL at 22:32

## 2022-01-01 RX ADMIN — Medication 25 MICROGRAM(S): at 21:52

## 2022-01-01 RX ADMIN — PANTOPRAZOLE SODIUM 40 MILLIGRAM(S): 20 TABLET, DELAYED RELEASE ORAL at 06:26

## 2022-01-01 RX ADMIN — QUETIAPINE FUMARATE 25 MILLIGRAM(S): 200 TABLET, FILM COATED ORAL at 00:31

## 2022-01-01 RX ADMIN — DIVALPROEX SODIUM 250 MILLIGRAM(S): 500 TABLET, DELAYED RELEASE ORAL at 15:51

## 2022-01-01 RX ADMIN — PANTOPRAZOLE SODIUM 40 MILLIGRAM(S): 20 TABLET, DELAYED RELEASE ORAL at 17:20

## 2022-01-01 RX ADMIN — ATORVASTATIN CALCIUM 80 MILLIGRAM(S): 80 TABLET, FILM COATED ORAL at 22:59

## 2022-01-01 RX ADMIN — Medication 2 MILLIGRAM(S): at 21:51

## 2022-01-01 RX ADMIN — Medication 2 MILLIGRAM(S): at 05:47

## 2022-01-01 RX ADMIN — MIDODRINE HYDROCHLORIDE 10 MILLIGRAM(S): 2.5 TABLET ORAL at 11:37

## 2022-01-01 RX ADMIN — MEMANTINE HYDROCHLORIDE 5 MILLIGRAM(S): 10 TABLET ORAL at 21:42

## 2022-01-01 RX ADMIN — HEPARIN SODIUM 5000 UNIT(S): 5000 INJECTION INTRAVENOUS; SUBCUTANEOUS at 18:28

## 2022-01-01 RX ADMIN — QUETIAPINE FUMARATE 50 MILLIGRAM(S): 200 TABLET, FILM COATED ORAL at 22:16

## 2022-01-01 RX ADMIN — Medication 50 MILLIGRAM(S): at 00:28

## 2022-01-01 RX ADMIN — Medication 2 MILLIGRAM(S): at 21:58

## 2022-01-01 RX ADMIN — Medication 1 MILLIGRAM(S): at 11:28

## 2022-01-01 RX ADMIN — Medication 2 MILLIGRAM(S): at 13:26

## 2022-01-01 RX ADMIN — HEPARIN SODIUM 5000 UNIT(S): 5000 INJECTION INTRAVENOUS; SUBCUTANEOUS at 05:17

## 2022-01-01 RX ADMIN — HEPARIN SODIUM 5000 UNIT(S): 5000 INJECTION INTRAVENOUS; SUBCUTANEOUS at 17:14

## 2022-01-01 RX ADMIN — CARBIDOPA AND LEVODOPA 2 TABLET(S): 25; 100 TABLET ORAL at 22:15

## 2022-01-01 RX ADMIN — Medication 1: at 00:32

## 2022-01-01 RX ADMIN — Medication 50 MILLIGRAM(S): at 22:52

## 2022-01-01 RX ADMIN — MIRTAZAPINE 7.5 MILLIGRAM(S): 45 TABLET, ORALLY DISINTEGRATING ORAL at 11:23

## 2022-01-01 RX ADMIN — QUETIAPINE FUMARATE 50 MILLIGRAM(S): 200 TABLET, FILM COATED ORAL at 22:19

## 2022-01-01 RX ADMIN — HYDROMORPHONE HYDROCHLORIDE 0.5 MILLIGRAM(S): 2 INJECTION INTRAMUSCULAR; INTRAVENOUS; SUBCUTANEOUS at 22:43

## 2022-01-01 RX ADMIN — DEXMEDETOMIDINE HYDROCHLORIDE IN 0.9% SODIUM CHLORIDE 9.64 MICROGRAM(S)/KG/HR: 4 INJECTION INTRAVENOUS at 14:15

## 2022-01-01 RX ADMIN — Medication 1 MILLIGRAM(S): at 13:14

## 2022-01-01 RX ADMIN — CHLORHEXIDINE GLUCONATE 1 APPLICATION(S): 213 SOLUTION TOPICAL at 05:36

## 2022-01-01 RX ADMIN — QUETIAPINE FUMARATE 50 MILLIGRAM(S): 200 TABLET, FILM COATED ORAL at 21:53

## 2022-01-01 RX ADMIN — Medication 25 MICROGRAM(S): at 05:24

## 2022-01-01 RX ADMIN — MIDODRINE HYDROCHLORIDE 30 MILLIGRAM(S): 2.5 TABLET ORAL at 18:43

## 2022-01-01 RX ADMIN — ATORVASTATIN CALCIUM 80 MILLIGRAM(S): 80 TABLET, FILM COATED ORAL at 21:27

## 2022-01-01 RX ADMIN — Medication 1: at 08:26

## 2022-01-01 RX ADMIN — MIDODRINE HYDROCHLORIDE 30 MILLIGRAM(S): 2.5 TABLET ORAL at 03:42

## 2022-01-01 RX ADMIN — HEPARIN SODIUM 5000 UNIT(S): 5000 INJECTION INTRAVENOUS; SUBCUTANEOUS at 05:34

## 2022-01-01 RX ADMIN — ATORVASTATIN CALCIUM 80 MILLIGRAM(S): 80 TABLET, FILM COATED ORAL at 21:59

## 2022-01-01 RX ADMIN — ATORVASTATIN CALCIUM 80 MILLIGRAM(S): 80 TABLET, FILM COATED ORAL at 21:38

## 2022-01-01 RX ADMIN — MEMANTINE HYDROCHLORIDE 5 MILLIGRAM(S): 10 TABLET ORAL at 22:01

## 2022-01-01 RX ADMIN — MIRTAZAPINE 7.5 MILLIGRAM(S): 45 TABLET, ORALLY DISINTEGRATING ORAL at 18:13

## 2022-01-01 RX ADMIN — Medication 25 GRAM(S): at 01:04

## 2022-01-01 RX ADMIN — MUPIROCIN 1 APPLICATION(S): 20 OINTMENT TOPICAL at 17:08

## 2022-01-01 RX ADMIN — MIDODRINE HYDROCHLORIDE 20 MILLIGRAM(S): 2.5 TABLET ORAL at 17:14

## 2022-01-01 RX ADMIN — CINACALCET 60 MILLIGRAM(S): 30 TABLET, FILM COATED ORAL at 11:32

## 2022-01-01 RX ADMIN — CHLORHEXIDINE GLUCONATE 1 APPLICATION(S): 213 SOLUTION TOPICAL at 06:28

## 2022-01-01 RX ADMIN — Medication 1 MILLIGRAM(S): at 12:45

## 2022-01-01 RX ADMIN — Medication 2 MILLIGRAM(S): at 13:52

## 2022-01-01 RX ADMIN — CARBIDOPA AND LEVODOPA 2.5 TABLET(S): 25; 100 TABLET ORAL at 18:08

## 2022-01-01 RX ADMIN — DIVALPROEX SODIUM 250 MILLIGRAM(S): 500 TABLET, DELAYED RELEASE ORAL at 22:34

## 2022-01-01 RX ADMIN — CARBIDOPA AND LEVODOPA 2 TABLET(S): 25; 100 TABLET ORAL at 21:50

## 2022-01-01 RX ADMIN — HEPARIN SODIUM 5000 UNIT(S): 5000 INJECTION INTRAVENOUS; SUBCUTANEOUS at 05:08

## 2022-01-01 RX ADMIN — DULOXETINE HYDROCHLORIDE 20 MILLIGRAM(S): 30 CAPSULE, DELAYED RELEASE ORAL at 12:03

## 2022-01-01 RX ADMIN — PANTOPRAZOLE SODIUM 40 MILLIGRAM(S): 20 TABLET, DELAYED RELEASE ORAL at 06:05

## 2022-01-01 RX ADMIN — MIDODRINE HYDROCHLORIDE 20 MILLIGRAM(S): 2.5 TABLET ORAL at 09:05

## 2022-01-01 RX ADMIN — DULOXETINE HYDROCHLORIDE 20 MILLIGRAM(S): 30 CAPSULE, DELAYED RELEASE ORAL at 11:27

## 2022-01-01 RX ADMIN — Medication 1 TABLET(S): at 11:57

## 2022-01-01 RX ADMIN — MEMANTINE HYDROCHLORIDE 5 MILLIGRAM(S): 10 TABLET ORAL at 21:58

## 2022-01-01 RX ADMIN — CHLORHEXIDINE GLUCONATE 15 MILLILITER(S): 213 SOLUTION TOPICAL at 05:01

## 2022-01-01 RX ADMIN — MEMANTINE HYDROCHLORIDE 5 MILLIGRAM(S): 10 TABLET ORAL at 23:52

## 2022-01-01 RX ADMIN — DEXMEDETOMIDINE HYDROCHLORIDE IN 0.9% SODIUM CHLORIDE 9.64 MICROGRAM(S)/KG/HR: 4 INJECTION INTRAVENOUS at 10:32

## 2022-01-01 RX ADMIN — MIRTAZAPINE 7.5 MILLIGRAM(S): 45 TABLET, ORALLY DISINTEGRATING ORAL at 12:12

## 2022-01-01 RX ADMIN — HEPARIN SODIUM 5000 UNIT(S): 5000 INJECTION INTRAVENOUS; SUBCUTANEOUS at 17:57

## 2022-01-01 RX ADMIN — Medication 1: at 12:15

## 2022-01-01 RX ADMIN — DIVALPROEX SODIUM 250 MILLIGRAM(S): 500 TABLET, DELAYED RELEASE ORAL at 22:01

## 2022-01-01 RX ADMIN — CINACALCET 60 MILLIGRAM(S): 30 TABLET, FILM COATED ORAL at 12:03

## 2022-01-01 RX ADMIN — MEMANTINE HYDROCHLORIDE 5 MILLIGRAM(S): 10 TABLET ORAL at 21:54

## 2022-01-01 RX ADMIN — DIVALPROEX SODIUM 250 MILLIGRAM(S): 500 TABLET, DELAYED RELEASE ORAL at 23:52

## 2022-01-01 RX ADMIN — Medication 1 TABLET(S): at 13:21

## 2022-01-01 RX ADMIN — Medication 1 MILLIGRAM(S): at 11:19

## 2022-01-01 RX ADMIN — QUETIAPINE FUMARATE 25 MILLIGRAM(S): 200 TABLET, FILM COATED ORAL at 21:58

## 2022-01-01 RX ADMIN — CHLORHEXIDINE GLUCONATE 1 APPLICATION(S): 213 SOLUTION TOPICAL at 06:09

## 2022-01-01 RX ADMIN — ATORVASTATIN CALCIUM 80 MILLIGRAM(S): 80 TABLET, FILM COATED ORAL at 21:44

## 2022-01-01 RX ADMIN — Medication 2 MILLIGRAM(S): at 14:57

## 2022-01-01 RX ADMIN — Medication 1: at 12:32

## 2022-01-01 RX ADMIN — DULOXETINE HYDROCHLORIDE 20 MILLIGRAM(S): 30 CAPSULE, DELAYED RELEASE ORAL at 11:56

## 2022-01-01 RX ADMIN — CHLORHEXIDINE GLUCONATE 1 APPLICATION(S): 213 SOLUTION TOPICAL at 06:52

## 2022-01-01 RX ADMIN — Medication 2 MILLIGRAM(S): at 05:58

## 2022-01-01 RX ADMIN — Medication 2 MILLIGRAM(S): at 13:22

## 2022-01-01 RX ADMIN — DIVALPROEX SODIUM 250 MILLIGRAM(S): 500 TABLET, DELAYED RELEASE ORAL at 13:33

## 2022-01-01 RX ADMIN — Medication 5 MILLIGRAM(S): at 06:14

## 2022-01-01 RX ADMIN — HEPARIN SODIUM 5000 UNIT(S): 5000 INJECTION INTRAVENOUS; SUBCUTANEOUS at 17:29

## 2022-01-01 RX ADMIN — ATORVASTATIN CALCIUM 80 MILLIGRAM(S): 80 TABLET, FILM COATED ORAL at 22:22

## 2022-01-01 RX ADMIN — Medication 125 MILLIGRAM(S): at 00:42

## 2022-01-01 RX ADMIN — Medication 1: at 23:03

## 2022-01-01 RX ADMIN — CHLORHEXIDINE GLUCONATE 15 MILLILITER(S): 213 SOLUTION TOPICAL at 05:10

## 2022-01-01 RX ADMIN — PANTOPRAZOLE SODIUM 40 MILLIGRAM(S): 20 TABLET, DELAYED RELEASE ORAL at 05:12

## 2022-01-01 RX ADMIN — ERYTHROPOIETIN 10000 UNIT(S): 10000 INJECTION, SOLUTION INTRAVENOUS; SUBCUTANEOUS at 07:00

## 2022-01-01 RX ADMIN — DULOXETINE HYDROCHLORIDE 20 MILLIGRAM(S): 30 CAPSULE, DELAYED RELEASE ORAL at 12:18

## 2022-01-01 RX ADMIN — Medication 2 MILLIGRAM(S): at 11:57

## 2022-01-01 RX ADMIN — MIDODRINE HYDROCHLORIDE 15 MILLIGRAM(S): 2.5 TABLET ORAL at 10:31

## 2022-01-01 RX ADMIN — Medication 2 MILLIGRAM(S): at 13:13

## 2022-01-01 RX ADMIN — Medication 20 MICROGRAM(S): at 22:54

## 2022-01-01 RX ADMIN — CHLORHEXIDINE GLUCONATE 1 APPLICATION(S): 213 SOLUTION TOPICAL at 06:16

## 2022-01-01 RX ADMIN — CARBIDOPA AND LEVODOPA 2.5 TABLET(S): 25; 100 TABLET ORAL at 05:28

## 2022-01-01 RX ADMIN — MIRTAZAPINE 7.5 MILLIGRAM(S): 45 TABLET, ORALLY DISINTEGRATING ORAL at 11:36

## 2022-01-01 RX ADMIN — HYDROMORPHONE HYDROCHLORIDE 0.5 MILLIGRAM(S): 2 INJECTION INTRAMUSCULAR; INTRAVENOUS; SUBCUTANEOUS at 22:58

## 2022-01-01 RX ADMIN — Medication 2 MILLIGRAM(S): at 22:53

## 2022-01-01 RX ADMIN — Medication 2 MILLIGRAM(S): at 05:13

## 2022-01-01 RX ADMIN — Medication 2 MILLIGRAM(S): at 22:21

## 2022-01-01 RX ADMIN — ROBINUL 0.4 MILLIGRAM(S): 0.2 INJECTION INTRAMUSCULAR; INTRAVENOUS at 05:09

## 2022-01-01 RX ADMIN — PANTOPRAZOLE SODIUM 40 MILLIGRAM(S): 20 TABLET, DELAYED RELEASE ORAL at 17:01

## 2022-01-01 RX ADMIN — Medication 1000 UNIT(S): at 12:59

## 2022-01-01 RX ADMIN — CARBIDOPA AND LEVODOPA 2.5 TABLET(S): 25; 100 TABLET ORAL at 06:19

## 2022-01-01 RX ADMIN — Medication 1 MILLIGRAM(S): at 11:50

## 2022-01-01 RX ADMIN — CHLORHEXIDINE GLUCONATE 1 APPLICATION(S): 213 SOLUTION TOPICAL at 06:21

## 2022-01-01 RX ADMIN — POLYETHYLENE GLYCOL 3350 17 GRAM(S): 17 POWDER, FOR SOLUTION ORAL at 18:14

## 2022-01-01 RX ADMIN — Medication 50 MILLIGRAM(S): at 10:44

## 2022-01-01 RX ADMIN — DULOXETINE HYDROCHLORIDE 20 MILLIGRAM(S): 30 CAPSULE, DELAYED RELEASE ORAL at 11:45

## 2022-01-01 RX ADMIN — Medication 1 TABLET(S): at 11:35

## 2022-01-01 RX ADMIN — Medication 1 MILLIGRAM(S): at 11:25

## 2022-01-01 RX ADMIN — PANTOPRAZOLE SODIUM 40 MILLIGRAM(S): 20 TABLET, DELAYED RELEASE ORAL at 05:58

## 2022-01-01 RX ADMIN — Medication 125 MILLIGRAM(S): at 05:09

## 2022-01-01 RX ADMIN — PANTOPRAZOLE SODIUM 40 MILLIGRAM(S): 20 TABLET, DELAYED RELEASE ORAL at 17:23

## 2022-01-01 RX ADMIN — PANTOPRAZOLE SODIUM 40 MILLIGRAM(S): 20 TABLET, DELAYED RELEASE ORAL at 17:24

## 2022-01-01 RX ADMIN — Medication 650 MILLIGRAM(S): at 22:36

## 2022-01-01 RX ADMIN — ATORVASTATIN CALCIUM 80 MILLIGRAM(S): 80 TABLET, FILM COATED ORAL at 21:41

## 2022-01-01 RX ADMIN — Medication 2 MILLIGRAM(S): at 06:23

## 2022-01-01 RX ADMIN — ATORVASTATIN CALCIUM 80 MILLIGRAM(S): 80 TABLET, FILM COATED ORAL at 22:00

## 2022-01-01 RX ADMIN — ATORVASTATIN CALCIUM 80 MILLIGRAM(S): 80 TABLET, FILM COATED ORAL at 22:12

## 2022-01-01 RX ADMIN — Medication 50 MILLILITER(S): at 01:10

## 2022-01-01 RX ADMIN — HEPARIN SODIUM 5000 UNIT(S): 5000 INJECTION INTRAVENOUS; SUBCUTANEOUS at 06:22

## 2022-01-01 RX ADMIN — CARBIDOPA AND LEVODOPA 2.5 TABLET(S): 25; 100 TABLET ORAL at 13:22

## 2022-01-01 RX ADMIN — Medication 2 MILLIGRAM(S): at 21:34

## 2022-01-01 RX ADMIN — PANTOPRAZOLE SODIUM 10 MG/HR: 20 TABLET, DELAYED RELEASE ORAL at 06:18

## 2022-01-01 RX ADMIN — CARBIDOPA AND LEVODOPA 2 TABLET(S): 25; 100 TABLET ORAL at 21:34

## 2022-01-01 RX ADMIN — MIRTAZAPINE 7.5 MILLIGRAM(S): 45 TABLET, ORALLY DISINTEGRATING ORAL at 11:25

## 2022-01-01 RX ADMIN — PIPERACILLIN AND TAZOBACTAM 25 GRAM(S): 4; .5 INJECTION, POWDER, LYOPHILIZED, FOR SOLUTION INTRAVENOUS at 05:34

## 2022-01-01 RX ADMIN — Medication 1 MILLIGRAM(S): at 21:51

## 2022-01-01 RX ADMIN — DIVALPROEX SODIUM 250 MILLIGRAM(S): 500 TABLET, DELAYED RELEASE ORAL at 16:42

## 2022-01-01 RX ADMIN — Medication 1: at 12:30

## 2022-01-01 RX ADMIN — HEPARIN SODIUM 5000 UNIT(S): 5000 INJECTION INTRAVENOUS; SUBCUTANEOUS at 05:22

## 2022-01-01 RX ADMIN — MEMANTINE HYDROCHLORIDE 5 MILLIGRAM(S): 10 TABLET ORAL at 22:28

## 2022-01-01 RX ADMIN — CARBIDOPA AND LEVODOPA 2.5 TABLET(S): 25; 100 TABLET ORAL at 05:26

## 2022-01-01 RX ADMIN — Medication 1 MILLIGRAM(S): at 14:05

## 2022-01-01 RX ADMIN — DIVALPROEX SODIUM 250 MILLIGRAM(S): 500 TABLET, DELAYED RELEASE ORAL at 15:22

## 2022-01-01 RX ADMIN — MEMANTINE HYDROCHLORIDE 5 MILLIGRAM(S): 10 TABLET ORAL at 21:04

## 2022-01-01 RX ADMIN — POLYETHYLENE GLYCOL 3350 17 GRAM(S): 17 POWDER, FOR SOLUTION ORAL at 17:39

## 2022-01-01 RX ADMIN — HYDROMORPHONE HYDROCHLORIDE 0.5 MILLIGRAM(S): 2 INJECTION INTRAMUSCULAR; INTRAVENOUS; SUBCUTANEOUS at 18:23

## 2022-01-01 RX ADMIN — Medication 30 MICROGRAM(S): at 22:15

## 2022-01-01 RX ADMIN — SENNA PLUS 10 MILLILITER(S): 8.6 TABLET ORAL at 22:01

## 2022-01-01 RX ADMIN — ATORVASTATIN CALCIUM 80 MILLIGRAM(S): 80 TABLET, FILM COATED ORAL at 21:45

## 2022-01-01 RX ADMIN — Medication 1 MILLIGRAM(S): at 11:13

## 2022-01-01 RX ADMIN — PANTOPRAZOLE SODIUM 40 MILLIGRAM(S): 20 TABLET, DELAYED RELEASE ORAL at 17:45

## 2022-01-01 RX ADMIN — DIVALPROEX SODIUM 250 MILLIGRAM(S): 500 TABLET, DELAYED RELEASE ORAL at 22:19

## 2022-01-01 RX ADMIN — CHLORHEXIDINE GLUCONATE 1 APPLICATION(S): 213 SOLUTION TOPICAL at 05:35

## 2022-01-01 RX ADMIN — SENNA PLUS 10 MILLILITER(S): 8.6 TABLET ORAL at 21:53

## 2022-01-01 RX ADMIN — DIVALPROEX SODIUM 250 MILLIGRAM(S): 500 TABLET, DELAYED RELEASE ORAL at 14:53

## 2022-01-01 RX ADMIN — MEMANTINE HYDROCHLORIDE 5 MILLIGRAM(S): 10 TABLET ORAL at 22:21

## 2022-01-01 RX ADMIN — MIDODRINE HYDROCHLORIDE 10 MILLIGRAM(S): 2.5 TABLET ORAL at 12:08

## 2022-01-01 RX ADMIN — Medication 1 MILLIGRAM(S): at 11:36

## 2022-01-01 RX ADMIN — Medication 1 TABLET(S): at 11:40

## 2022-01-01 RX ADMIN — CARBIDOPA AND LEVODOPA 2 TABLET(S): 25; 100 TABLET ORAL at 22:55

## 2022-01-01 RX ADMIN — Medication 0.5 MILLIGRAM(S): at 00:10

## 2022-01-01 RX ADMIN — Medication 50 MILLIGRAM(S): at 02:37

## 2022-01-01 RX ADMIN — CARBIDOPA AND LEVODOPA 2.5 TABLET(S): 25; 100 TABLET ORAL at 06:53

## 2022-01-01 RX ADMIN — DIVALPROEX SODIUM 250 MILLIGRAM(S): 500 TABLET, DELAYED RELEASE ORAL at 22:39

## 2022-01-01 RX ADMIN — PANTOPRAZOLE SODIUM 40 MILLIGRAM(S): 20 TABLET, DELAYED RELEASE ORAL at 18:22

## 2022-01-01 RX ADMIN — PANTOPRAZOLE SODIUM 40 MILLIGRAM(S): 20 TABLET, DELAYED RELEASE ORAL at 06:21

## 2022-01-01 RX ADMIN — DIVALPROEX SODIUM 250 MILLIGRAM(S): 500 TABLET, DELAYED RELEASE ORAL at 14:59

## 2022-01-01 RX ADMIN — Medication 50 MILLIGRAM(S): at 21:08

## 2022-01-01 RX ADMIN — ATORVASTATIN CALCIUM 80 MILLIGRAM(S): 80 TABLET, FILM COATED ORAL at 21:40

## 2022-01-01 RX ADMIN — ATORVASTATIN CALCIUM 80 MILLIGRAM(S): 80 TABLET, FILM COATED ORAL at 22:53

## 2022-01-01 RX ADMIN — HYDROMORPHONE HYDROCHLORIDE 1 MILLIGRAM(S): 2 INJECTION INTRAMUSCULAR; INTRAVENOUS; SUBCUTANEOUS at 17:45

## 2022-01-01 RX ADMIN — DULOXETINE HYDROCHLORIDE 20 MILLIGRAM(S): 30 CAPSULE, DELAYED RELEASE ORAL at 16:54

## 2022-01-01 RX ADMIN — Medication 2 MILLIGRAM(S): at 22:15

## 2022-01-01 RX ADMIN — DULOXETINE HYDROCHLORIDE 20 MILLIGRAM(S): 30 CAPSULE, DELAYED RELEASE ORAL at 11:24

## 2022-01-01 RX ADMIN — Medication 1: at 12:16

## 2022-01-01 RX ADMIN — DIVALPROEX SODIUM 250 MILLIGRAM(S): 500 TABLET, DELAYED RELEASE ORAL at 06:13

## 2022-01-01 RX ADMIN — DIVALPROEX SODIUM 250 MILLIGRAM(S): 500 TABLET, DELAYED RELEASE ORAL at 14:31

## 2022-01-01 RX ADMIN — Medication 50 MILLIGRAM(S): at 12:11

## 2022-01-01 RX ADMIN — Medication 50 MILLIGRAM(S): at 22:07

## 2022-01-01 RX ADMIN — ATORVASTATIN CALCIUM 80 MILLIGRAM(S): 80 TABLET, FILM COATED ORAL at 22:23

## 2022-01-01 RX ADMIN — Medication 1 TABLET(S): at 13:50

## 2022-01-01 RX ADMIN — Medication 25 MICROGRAM(S): at 05:27

## 2022-01-01 RX ADMIN — Medication 50 MICROGRAM(S): at 06:09

## 2022-01-01 RX ADMIN — DIVALPROEX SODIUM 250 MILLIGRAM(S): 500 TABLET, DELAYED RELEASE ORAL at 21:44

## 2022-01-01 RX ADMIN — ROBINUL 0.4 MILLIGRAM(S): 0.2 INJECTION INTRAMUSCULAR; INTRAVENOUS at 23:18

## 2022-01-01 RX ADMIN — QUETIAPINE FUMARATE 50 MILLIGRAM(S): 200 TABLET, FILM COATED ORAL at 21:47

## 2022-01-01 RX ADMIN — PIPERACILLIN AND TAZOBACTAM 25 GRAM(S): 4; .5 INJECTION, POWDER, LYOPHILIZED, FOR SOLUTION INTRAVENOUS at 12:38

## 2022-01-01 RX ADMIN — CHLORHEXIDINE GLUCONATE 1 APPLICATION(S): 213 SOLUTION TOPICAL at 06:06

## 2022-01-01 RX ADMIN — Medication 1 TABLET(S): at 12:29

## 2022-01-01 RX ADMIN — HEPARIN SODIUM 5000 UNIT(S): 5000 INJECTION INTRAVENOUS; SUBCUTANEOUS at 05:27

## 2022-01-01 RX ADMIN — Medication 50 MILLIGRAM(S): at 11:13

## 2022-01-01 RX ADMIN — Medication 2 MILLIGRAM(S): at 12:11

## 2022-01-01 RX ADMIN — Medication 1 TABLET(S): at 12:18

## 2022-01-01 RX ADMIN — PANTOPRAZOLE SODIUM 40 MILLIGRAM(S): 20 TABLET, DELAYED RELEASE ORAL at 06:44

## 2022-01-01 RX ADMIN — POLYETHYLENE GLYCOL 3350 17 GRAM(S): 17 POWDER, FOR SOLUTION ORAL at 05:59

## 2022-01-01 RX ADMIN — Medication 1 MILLIGRAM(S): at 13:08

## 2022-01-01 RX ADMIN — HEPARIN SODIUM 5000 UNIT(S): 5000 INJECTION INTRAVENOUS; SUBCUTANEOUS at 06:32

## 2022-01-01 RX ADMIN — POLYETHYLENE GLYCOL 3350 17 GRAM(S): 17 POWDER, FOR SOLUTION ORAL at 17:44

## 2022-01-01 RX ADMIN — CHLORHEXIDINE GLUCONATE 1 APPLICATION(S): 213 SOLUTION TOPICAL at 05:16

## 2022-01-01 RX ADMIN — CARBIDOPA AND LEVODOPA 2.5 TABLET(S): 25; 100 TABLET ORAL at 13:43

## 2022-01-01 RX ADMIN — DIVALPROEX SODIUM 250 MILLIGRAM(S): 500 TABLET, DELAYED RELEASE ORAL at 14:28

## 2022-01-01 RX ADMIN — INSULIN GLARGINE 6 UNIT(S): 100 INJECTION, SOLUTION SUBCUTANEOUS at 22:49

## 2022-01-01 RX ADMIN — DIVALPROEX SODIUM 250 MILLIGRAM(S): 500 TABLET, DELAYED RELEASE ORAL at 22:05

## 2022-01-01 RX ADMIN — HEPARIN SODIUM 5000 UNIT(S): 5000 INJECTION INTRAVENOUS; SUBCUTANEOUS at 06:16

## 2022-01-01 RX ADMIN — Medication 5 MILLIGRAM(S): at 22:43

## 2022-01-01 RX ADMIN — MIDODRINE HYDROCHLORIDE 10 MILLIGRAM(S): 2.5 TABLET ORAL at 08:07

## 2022-01-01 RX ADMIN — PANTOPRAZOLE SODIUM 40 MILLIGRAM(S): 20 TABLET, DELAYED RELEASE ORAL at 18:37

## 2022-01-01 RX ADMIN — ROBINUL 0.4 MILLIGRAM(S): 0.2 INJECTION INTRAMUSCULAR; INTRAVENOUS at 17:24

## 2022-01-01 RX ADMIN — Medication 50 MILLIGRAM(S): at 12:00

## 2022-01-01 RX ADMIN — HYDROMORPHONE HYDROCHLORIDE 0.5 MILLIGRAM(S): 2 INJECTION INTRAMUSCULAR; INTRAVENOUS; SUBCUTANEOUS at 06:48

## 2022-01-01 RX ADMIN — CHLORHEXIDINE GLUCONATE 1 APPLICATION(S): 213 SOLUTION TOPICAL at 06:48

## 2022-01-01 RX ADMIN — Medication 5 MILLIGRAM(S): at 00:17

## 2022-01-01 RX ADMIN — HEPARIN SODIUM 5000 UNIT(S): 5000 INJECTION INTRAVENOUS; SUBCUTANEOUS at 17:36

## 2022-01-01 RX ADMIN — DIVALPROEX SODIUM 250 MILLIGRAM(S): 500 TABLET, DELAYED RELEASE ORAL at 22:23

## 2022-01-01 RX ADMIN — Medication 50 MILLIGRAM(S): at 13:14

## 2022-01-01 RX ADMIN — CARBIDOPA AND LEVODOPA 2.5 TABLET(S): 25; 100 TABLET ORAL at 06:25

## 2022-01-01 RX ADMIN — Medication 50 MICROGRAM(S): at 06:15

## 2022-01-01 RX ADMIN — DULOXETINE HYDROCHLORIDE 20 MILLIGRAM(S): 30 CAPSULE, DELAYED RELEASE ORAL at 11:28

## 2022-01-01 RX ADMIN — PANTOPRAZOLE SODIUM 40 MILLIGRAM(S): 20 TABLET, DELAYED RELEASE ORAL at 17:15

## 2022-01-01 RX ADMIN — MIRTAZAPINE 7.5 MILLIGRAM(S): 45 TABLET, ORALLY DISINTEGRATING ORAL at 11:59

## 2022-01-01 RX ADMIN — Medication 1: at 17:22

## 2022-01-01 RX ADMIN — CARBIDOPA AND LEVODOPA 2 TABLET(S): 25; 100 TABLET ORAL at 05:26

## 2022-01-01 RX ADMIN — Medication 1 TABLET(S): at 12:45

## 2022-01-01 RX ADMIN — Medication 5 MILLIGRAM(S): at 06:37

## 2022-01-01 RX ADMIN — ROBINUL 0.4 MILLIGRAM(S): 0.2 INJECTION INTRAMUSCULAR; INTRAVENOUS at 06:18

## 2022-01-01 RX ADMIN — DIVALPROEX SODIUM 250 MILLIGRAM(S): 500 TABLET, DELAYED RELEASE ORAL at 05:37

## 2022-01-01 RX ADMIN — CHLORHEXIDINE GLUCONATE 15 MILLILITER(S): 213 SOLUTION TOPICAL at 17:08

## 2022-01-01 RX ADMIN — ATORVASTATIN CALCIUM 80 MILLIGRAM(S): 80 TABLET, FILM COATED ORAL at 21:10

## 2022-01-01 RX ADMIN — Medication 2: at 12:25

## 2022-01-01 RX ADMIN — QUETIAPINE FUMARATE 12.5 MILLIGRAM(S): 200 TABLET, FILM COATED ORAL at 14:16

## 2022-01-01 RX ADMIN — DIVALPROEX SODIUM 250 MILLIGRAM(S): 500 TABLET, DELAYED RELEASE ORAL at 06:06

## 2022-01-01 RX ADMIN — QUETIAPINE FUMARATE 50 MILLIGRAM(S): 200 TABLET, FILM COATED ORAL at 23:52

## 2022-01-01 RX ADMIN — CARBIDOPA AND LEVODOPA 2.5 TABLET(S): 25; 100 TABLET ORAL at 05:23

## 2022-01-01 RX ADMIN — TAMSULOSIN HYDROCHLORIDE 0.4 MILLIGRAM(S): 0.4 CAPSULE ORAL at 22:54

## 2022-01-01 RX ADMIN — HEPARIN SODIUM 5000 UNIT(S): 5000 INJECTION INTRAVENOUS; SUBCUTANEOUS at 05:18

## 2022-01-01 RX ADMIN — SENNA PLUS 10 MILLILITER(S): 8.6 TABLET ORAL at 21:44

## 2022-01-01 RX ADMIN — PANTOPRAZOLE SODIUM 40 MILLIGRAM(S): 20 TABLET, DELAYED RELEASE ORAL at 05:35

## 2022-01-01 RX ADMIN — Medication 2 MILLIGRAM(S): at 21:10

## 2022-01-01 RX ADMIN — DULOXETINE HYDROCHLORIDE 20 MILLIGRAM(S): 30 CAPSULE, DELAYED RELEASE ORAL at 12:01

## 2022-01-01 RX ADMIN — Medication 0.5 MILLIGRAM(S): at 23:07

## 2022-01-01 RX ADMIN — Medication 50 MILLIGRAM(S): at 12:29

## 2022-01-01 RX ADMIN — PANTOPRAZOLE SODIUM 40 MILLIGRAM(S): 20 TABLET, DELAYED RELEASE ORAL at 18:43

## 2022-01-01 RX ADMIN — QUETIAPINE FUMARATE 12.5 MILLIGRAM(S): 200 TABLET, FILM COATED ORAL at 22:01

## 2022-01-01 RX ADMIN — Medication 1 TABLET(S): at 12:26

## 2022-01-01 RX ADMIN — CARBIDOPA AND LEVODOPA 2.5 TABLET(S): 25; 100 TABLET ORAL at 14:29

## 2022-01-01 RX ADMIN — DIVALPROEX SODIUM 250 MILLIGRAM(S): 500 TABLET, DELAYED RELEASE ORAL at 12:23

## 2022-01-01 RX ADMIN — DIVALPROEX SODIUM 250 MILLIGRAM(S): 500 TABLET, DELAYED RELEASE ORAL at 06:00

## 2022-01-01 RX ADMIN — HEPARIN SODIUM 5000 UNIT(S): 5000 INJECTION INTRAVENOUS; SUBCUTANEOUS at 06:08

## 2022-01-01 RX ADMIN — HEPARIN SODIUM 5000 UNIT(S): 5000 INJECTION INTRAVENOUS; SUBCUTANEOUS at 18:19

## 2022-01-01 RX ADMIN — MIRTAZAPINE 7.5 MILLIGRAM(S): 45 TABLET, ORALLY DISINTEGRATING ORAL at 12:59

## 2022-01-01 RX ADMIN — CINACALCET 60 MILLIGRAM(S): 30 TABLET, FILM COATED ORAL at 11:58

## 2022-01-01 RX ADMIN — MIRTAZAPINE 7.5 MILLIGRAM(S): 45 TABLET, ORALLY DISINTEGRATING ORAL at 12:26

## 2022-01-01 RX ADMIN — Medication 2 MILLIGRAM(S): at 11:47

## 2022-01-01 RX ADMIN — DIVALPROEX SODIUM 250 MILLIGRAM(S): 500 TABLET, DELAYED RELEASE ORAL at 13:29

## 2022-01-01 RX ADMIN — INSULIN GLARGINE 6 UNIT(S): 100 INJECTION, SOLUTION SUBCUTANEOUS at 22:31

## 2022-01-01 RX ADMIN — Medication 2 MILLIGRAM(S): at 21:44

## 2022-01-01 RX ADMIN — CARBIDOPA AND LEVODOPA 2 TABLET(S): 25; 100 TABLET ORAL at 21:05

## 2022-01-01 RX ADMIN — Medication 2 MILLIGRAM(S): at 21:42

## 2022-01-01 RX ADMIN — PANTOPRAZOLE SODIUM 40 MILLIGRAM(S): 20 TABLET, DELAYED RELEASE ORAL at 18:19

## 2022-01-01 RX ADMIN — Medication 2 MILLIGRAM(S): at 05:22

## 2022-01-01 RX ADMIN — PANTOPRAZOLE SODIUM 40 MILLIGRAM(S): 20 TABLET, DELAYED RELEASE ORAL at 18:39

## 2022-01-01 RX ADMIN — CHLORHEXIDINE GLUCONATE 1 APPLICATION(S): 213 SOLUTION TOPICAL at 07:03

## 2022-01-01 RX ADMIN — DULOXETINE HYDROCHLORIDE 20 MILLIGRAM(S): 30 CAPSULE, DELAYED RELEASE ORAL at 12:26

## 2022-01-01 RX ADMIN — ATORVASTATIN CALCIUM 80 MILLIGRAM(S): 80 TABLET, FILM COATED ORAL at 22:33

## 2022-01-01 RX ADMIN — SODIUM CHLORIDE 1200 MILLILITER(S): 9 INJECTION INTRAMUSCULAR; INTRAVENOUS; SUBCUTANEOUS at 13:00

## 2022-01-01 RX ADMIN — QUETIAPINE FUMARATE 50 MILLIGRAM(S): 200 TABLET, FILM COATED ORAL at 21:08

## 2022-01-01 RX ADMIN — CARBIDOPA AND LEVODOPA 2.5 TABLET(S): 25; 100 TABLET ORAL at 14:07

## 2022-01-01 RX ADMIN — CINACALCET 60 MILLIGRAM(S): 30 TABLET, FILM COATED ORAL at 11:39

## 2022-01-01 RX ADMIN — Medication 2 MILLIGRAM(S): at 06:03

## 2022-01-01 RX ADMIN — MEMANTINE HYDROCHLORIDE 5 MILLIGRAM(S): 10 TABLET ORAL at 21:40

## 2022-01-01 RX ADMIN — CINACALCET 60 MILLIGRAM(S): 30 TABLET, FILM COATED ORAL at 17:42

## 2022-01-01 RX ADMIN — CARBIDOPA AND LEVODOPA 2 TABLET(S): 25; 100 TABLET ORAL at 22:27

## 2022-01-01 RX ADMIN — CARBIDOPA AND LEVODOPA 2.5 TABLET(S): 25; 100 TABLET ORAL at 13:21

## 2022-01-01 RX ADMIN — PANTOPRAZOLE SODIUM 40 MILLIGRAM(S): 20 TABLET, DELAYED RELEASE ORAL at 06:09

## 2022-01-01 RX ADMIN — Medication 2 MILLIGRAM(S): at 13:56

## 2022-01-01 RX ADMIN — SODIUM CHLORIDE 40 MILLILITER(S): 9 INJECTION, SOLUTION INTRAVENOUS at 09:16

## 2022-01-01 RX ADMIN — CARBIDOPA AND LEVODOPA 2.5 TABLET(S): 25; 100 TABLET ORAL at 13:49

## 2022-01-01 RX ADMIN — SENNA PLUS 10 MILLILITER(S): 8.6 TABLET ORAL at 21:09

## 2022-01-01 RX ADMIN — HEPARIN SODIUM 5000 UNIT(S): 5000 INJECTION INTRAVENOUS; SUBCUTANEOUS at 18:43

## 2022-01-01 RX ADMIN — Medication 30 MICROGRAM(S): at 21:40

## 2022-01-01 RX ADMIN — CHLORHEXIDINE GLUCONATE 1 APPLICATION(S): 213 SOLUTION TOPICAL at 05:30

## 2022-01-01 RX ADMIN — Medication 2 MILLIGRAM(S): at 22:23

## 2022-01-01 RX ADMIN — Medication 1 MILLIGRAM(S): at 12:08

## 2022-01-01 RX ADMIN — Medication 125 MILLIGRAM(S): at 05:04

## 2022-01-01 RX ADMIN — ATORVASTATIN CALCIUM 80 MILLIGRAM(S): 80 TABLET, FILM COATED ORAL at 22:15

## 2022-01-01 RX ADMIN — MIRTAZAPINE 7.5 MILLIGRAM(S): 45 TABLET, ORALLY DISINTEGRATING ORAL at 11:29

## 2022-01-01 RX ADMIN — MEMANTINE HYDROCHLORIDE 5 MILLIGRAM(S): 10 TABLET ORAL at 21:41

## 2022-01-01 RX ADMIN — Medication 2 MILLIGRAM(S): at 05:27

## 2022-01-01 RX ADMIN — MIDODRINE HYDROCHLORIDE 15 MILLIGRAM(S): 2.5 TABLET ORAL at 09:20

## 2022-01-01 RX ADMIN — ERYTHROPOIETIN 10000 UNIT(S): 10000 INJECTION, SOLUTION INTRAVENOUS; SUBCUTANEOUS at 18:55

## 2022-01-01 RX ADMIN — CARBIDOPA AND LEVODOPA 2.5 TABLET(S): 25; 100 TABLET ORAL at 06:15

## 2022-01-01 RX ADMIN — Medication 1 MILLIGRAM(S): at 13:00

## 2022-01-01 RX ADMIN — Medication 2 MILLIGRAM(S): at 07:03

## 2022-01-01 RX ADMIN — CHLORHEXIDINE GLUCONATE 1 APPLICATION(S): 213 SOLUTION TOPICAL at 05:20

## 2022-01-01 RX ADMIN — SODIUM CHLORIDE 1200 MILLILITER(S): 9 INJECTION INTRAMUSCULAR; INTRAVENOUS; SUBCUTANEOUS at 12:50

## 2022-01-01 RX ADMIN — Medication 50 MILLIGRAM(S): at 08:26

## 2022-01-01 RX ADMIN — Medication 650 MILLIGRAM(S): at 17:48

## 2022-01-01 RX ADMIN — CINACALCET 60 MILLIGRAM(S): 30 TABLET, FILM COATED ORAL at 13:14

## 2022-01-01 RX ADMIN — Medication 2 MILLIGRAM(S): at 05:08

## 2022-01-01 RX ADMIN — QUETIAPINE FUMARATE 50 MILLIGRAM(S): 200 TABLET, FILM COATED ORAL at 21:03

## 2022-01-01 RX ADMIN — ATORVASTATIN CALCIUM 80 MILLIGRAM(S): 80 TABLET, FILM COATED ORAL at 21:07

## 2022-01-01 RX ADMIN — Medication 5 MILLIGRAM(S): at 12:43

## 2022-01-01 RX ADMIN — DULOXETINE HYDROCHLORIDE 20 MILLIGRAM(S): 30 CAPSULE, DELAYED RELEASE ORAL at 12:04

## 2022-01-01 RX ADMIN — Medication 62.5 MILLIMOLE(S): at 13:04

## 2022-01-01 RX ADMIN — Medication 2 MILLIGRAM(S): at 06:24

## 2022-01-01 RX ADMIN — PANTOPRAZOLE SODIUM 40 MILLIGRAM(S): 20 TABLET, DELAYED RELEASE ORAL at 05:31

## 2022-01-01 RX ADMIN — ATORVASTATIN CALCIUM 80 MILLIGRAM(S): 80 TABLET, FILM COATED ORAL at 22:45

## 2022-01-01 RX ADMIN — CARBIDOPA AND LEVODOPA 2.5 TABLET(S): 25; 100 TABLET ORAL at 05:54

## 2022-01-01 RX ADMIN — MIRTAZAPINE 7.5 MILLIGRAM(S): 45 TABLET, ORALLY DISINTEGRATING ORAL at 11:40

## 2022-01-01 RX ADMIN — POLYETHYLENE GLYCOL 3350 17 GRAM(S): 17 POWDER, FOR SOLUTION ORAL at 18:43

## 2022-01-01 RX ADMIN — MIRTAZAPINE 7.5 MILLIGRAM(S): 45 TABLET, ORALLY DISINTEGRATING ORAL at 11:13

## 2022-01-01 RX ADMIN — Medication 3: at 01:01

## 2022-01-01 RX ADMIN — Medication 125 MILLIGRAM(S): at 05:44

## 2022-01-01 RX ADMIN — DIVALPROEX SODIUM 250 MILLIGRAM(S): 500 TABLET, DELAYED RELEASE ORAL at 05:08

## 2022-01-01 RX ADMIN — Medication 1 MILLIGRAM(S): at 11:49

## 2022-01-01 RX ADMIN — Medication 25 MICROGRAM(S): at 22:35

## 2022-01-01 RX ADMIN — CARBIDOPA AND LEVODOPA 2.5 TABLET(S): 25; 100 TABLET ORAL at 15:34

## 2022-01-01 RX ADMIN — MEMANTINE HYDROCHLORIDE 5 MILLIGRAM(S): 10 TABLET ORAL at 05:52

## 2022-01-01 RX ADMIN — MIDODRINE HYDROCHLORIDE 10 MILLIGRAM(S): 2.5 TABLET ORAL at 11:05

## 2022-01-01 RX ADMIN — Medication 50 MILLIGRAM(S): at 22:34

## 2022-01-01 RX ADMIN — Medication 1 MILLIGRAM(S): at 11:32

## 2022-01-01 RX ADMIN — HYDROMORPHONE HYDROCHLORIDE 1 MILLIGRAM(S): 2 INJECTION INTRAMUSCULAR; INTRAVENOUS; SUBCUTANEOUS at 14:59

## 2022-01-01 RX ADMIN — Medication 5 MILLIGRAM(S): at 17:43

## 2022-01-01 RX ADMIN — CARBIDOPA AND LEVODOPA 2.5 TABLET(S): 25; 100 TABLET ORAL at 13:54

## 2022-01-01 RX ADMIN — Medication 400 MILLIGRAM(S): at 20:06

## 2022-01-01 RX ADMIN — SODIUM CHLORIDE 20 MILLILITER(S): 9 INJECTION, SOLUTION INTRAVENOUS at 01:25

## 2022-01-01 RX ADMIN — Medication 1 PACKET(S): at 13:09

## 2022-01-01 RX ADMIN — Medication 50 MILLIGRAM(S): at 11:06

## 2022-01-01 RX ADMIN — Medication 50 MILLIGRAM(S): at 22:35

## 2022-01-01 RX ADMIN — MEMANTINE HYDROCHLORIDE 5 MILLIGRAM(S): 10 TABLET ORAL at 21:45

## 2022-01-01 RX ADMIN — PANTOPRAZOLE SODIUM 40 MILLIGRAM(S): 20 TABLET, DELAYED RELEASE ORAL at 06:28

## 2022-01-01 RX ADMIN — Medication 2 MILLIGRAM(S): at 22:35

## 2022-01-01 RX ADMIN — PANTOPRAZOLE SODIUM 40 MILLIGRAM(S): 20 TABLET, DELAYED RELEASE ORAL at 05:22

## 2022-01-01 RX ADMIN — CARBIDOPA AND LEVODOPA 2.5 TABLET(S): 25; 100 TABLET ORAL at 13:14

## 2022-01-01 RX ADMIN — Medication 30 MICROGRAM(S): at 23:52

## 2022-01-01 RX ADMIN — DIVALPROEX SODIUM 250 MILLIGRAM(S): 500 TABLET, DELAYED RELEASE ORAL at 06:28

## 2022-01-01 RX ADMIN — DIVALPROEX SODIUM 250 MILLIGRAM(S): 500 TABLET, DELAYED RELEASE ORAL at 05:22

## 2022-01-01 RX ADMIN — POLYETHYLENE GLYCOL 3350 17 GRAM(S): 17 POWDER, FOR SOLUTION ORAL at 06:48

## 2022-01-01 RX ADMIN — Medication 5 MILLIGRAM(S): at 11:45

## 2022-01-01 RX ADMIN — CARBIDOPA AND LEVODOPA 2.5 TABLET(S): 25; 100 TABLET ORAL at 13:26

## 2022-01-01 RX ADMIN — ROBINUL 0.4 MILLIGRAM(S): 0.2 INJECTION INTRAMUSCULAR; INTRAVENOUS at 00:10

## 2022-01-01 RX ADMIN — HYDROMORPHONE HYDROCHLORIDE 1 MILLIGRAM(S): 2 INJECTION INTRAMUSCULAR; INTRAVENOUS; SUBCUTANEOUS at 09:36

## 2022-01-01 RX ADMIN — Medication 1: at 00:50

## 2022-01-01 RX ADMIN — Medication 0.5 MILLIGRAM(S): at 17:23

## 2022-01-01 RX ADMIN — CARBIDOPA AND LEVODOPA 2.5 TABLET(S): 25; 100 TABLET ORAL at 05:41

## 2022-01-01 RX ADMIN — MEMANTINE HYDROCHLORIDE 5 MILLIGRAM(S): 10 TABLET ORAL at 22:45

## 2022-01-01 RX ADMIN — Medication 1 TABLET(S): at 11:24

## 2022-01-01 RX ADMIN — Medication 4 UNIT(S): at 05:23

## 2022-01-01 RX ADMIN — DIVALPROEX SODIUM 250 MILLIGRAM(S): 500 TABLET, DELAYED RELEASE ORAL at 14:05

## 2022-01-01 RX ADMIN — POLYETHYLENE GLYCOL 3350 17 GRAM(S): 17 POWDER, FOR SOLUTION ORAL at 17:58

## 2022-01-01 RX ADMIN — Medication 2 MILLIGRAM(S): at 05:23

## 2022-01-01 RX ADMIN — DIVALPROEX SODIUM 250 MILLIGRAM(S): 500 TABLET, DELAYED RELEASE ORAL at 05:48

## 2022-01-01 RX ADMIN — Medication 1 MILLIGRAM(S): at 12:28

## 2022-01-01 RX ADMIN — QUETIAPINE FUMARATE 50 MILLIGRAM(S): 200 TABLET, FILM COATED ORAL at 22:34

## 2022-01-01 RX ADMIN — Medication 0.5 MILLIGRAM(S): at 17:45

## 2022-01-01 RX ADMIN — Medication 25 MICROGRAM(S): at 06:06

## 2022-01-01 RX ADMIN — QUETIAPINE FUMARATE 25 MILLIGRAM(S): 200 TABLET, FILM COATED ORAL at 13:58

## 2022-01-01 RX ADMIN — Medication 5 MILLIGRAM(S): at 17:56

## 2022-01-01 RX ADMIN — DEXMEDETOMIDINE HYDROCHLORIDE IN 0.9% SODIUM CHLORIDE 9.64 MICROGRAM(S)/KG/HR: 4 INJECTION INTRAVENOUS at 09:15

## 2022-01-01 RX ADMIN — PANTOPRAZOLE SODIUM 40 MILLIGRAM(S): 20 TABLET, DELAYED RELEASE ORAL at 17:36

## 2022-01-01 RX ADMIN — Medication 1 MILLIGRAM(S): at 22:13

## 2022-01-01 RX ADMIN — Medication 30 MICROGRAM(S): at 22:59

## 2022-01-01 RX ADMIN — Medication 50 MILLIGRAM(S): at 11:50

## 2022-01-01 RX ADMIN — DIVALPROEX SODIUM 250 MILLIGRAM(S): 500 TABLET, DELAYED RELEASE ORAL at 05:27

## 2022-01-01 RX ADMIN — Medication 50 MILLIGRAM(S): at 21:46

## 2022-01-01 RX ADMIN — PANTOPRAZOLE SODIUM 40 MILLIGRAM(S): 20 TABLET, DELAYED RELEASE ORAL at 17:43

## 2022-01-01 RX ADMIN — INSULIN GLARGINE 6 UNIT(S): 100 INJECTION, SOLUTION SUBCUTANEOUS at 22:36

## 2022-01-01 RX ADMIN — INSULIN GLARGINE 6 UNIT(S): 100 INJECTION, SOLUTION SUBCUTANEOUS at 22:37

## 2022-01-01 RX ADMIN — DIVALPROEX SODIUM 250 MILLIGRAM(S): 500 TABLET, DELAYED RELEASE ORAL at 05:40

## 2022-01-01 RX ADMIN — MUPIROCIN 1 APPLICATION(S): 20 OINTMENT TOPICAL at 18:38

## 2022-01-01 RX ADMIN — POLYETHYLENE GLYCOL 3350 17 GRAM(S): 17 POWDER, FOR SOLUTION ORAL at 18:22

## 2022-01-01 RX ADMIN — TAMSULOSIN HYDROCHLORIDE 0.4 MILLIGRAM(S): 0.4 CAPSULE ORAL at 22:15

## 2022-01-01 RX ADMIN — DIVALPROEX SODIUM 250 MILLIGRAM(S): 500 TABLET, DELAYED RELEASE ORAL at 13:15

## 2022-01-01 RX ADMIN — CARBIDOPA AND LEVODOPA 2.5 TABLET(S): 25; 100 TABLET ORAL at 06:04

## 2022-01-01 RX ADMIN — Medication 1 PACKET(S): at 15:22

## 2022-01-01 RX ADMIN — Medication 1 MILLIGRAM(S): at 11:12

## 2022-01-01 RX ADMIN — DEXMEDETOMIDINE HYDROCHLORIDE IN 0.9% SODIUM CHLORIDE 9.64 MICROGRAM(S)/KG/HR: 4 INJECTION INTRAVENOUS at 05:07

## 2022-01-01 RX ADMIN — QUETIAPINE FUMARATE 25 MILLIGRAM(S): 200 TABLET, FILM COATED ORAL at 21:26

## 2022-01-01 RX ADMIN — ERYTHROPOIETIN 10000 UNIT(S): 10000 INJECTION, SOLUTION INTRAVENOUS; SUBCUTANEOUS at 15:35

## 2022-01-01 RX ADMIN — Medication 2 MILLIGRAM(S): at 05:26

## 2022-01-01 RX ADMIN — Medication 2 MILLIGRAM(S): at 05:15

## 2022-01-01 RX ADMIN — PANTOPRAZOLE SODIUM 40 MILLIGRAM(S): 20 TABLET, DELAYED RELEASE ORAL at 05:10

## 2022-01-01 RX ADMIN — CARBIDOPA AND LEVODOPA 2 TABLET(S): 25; 100 TABLET ORAL at 21:45

## 2022-01-01 RX ADMIN — DIVALPROEX SODIUM 250 MILLIGRAM(S): 500 TABLET, DELAYED RELEASE ORAL at 05:58

## 2022-01-01 RX ADMIN — MIDODRINE HYDROCHLORIDE 30 MILLIGRAM(S): 2.5 TABLET ORAL at 02:28

## 2022-01-01 RX ADMIN — CARBIDOPA AND LEVODOPA 2.5 TABLET(S): 25; 100 TABLET ORAL at 14:08

## 2022-01-01 RX ADMIN — Medication 2 MILLIGRAM(S): at 06:20

## 2022-01-01 RX ADMIN — DULOXETINE HYDROCHLORIDE 20 MILLIGRAM(S): 30 CAPSULE, DELAYED RELEASE ORAL at 11:50

## 2022-01-01 RX ADMIN — CHLORHEXIDINE GLUCONATE 1 APPLICATION(S): 213 SOLUTION TOPICAL at 05:58

## 2022-01-01 RX ADMIN — CARBIDOPA AND LEVODOPA 2.5 TABLET(S): 25; 100 TABLET ORAL at 12:11

## 2022-01-01 RX ADMIN — CHLORHEXIDINE GLUCONATE 1 APPLICATION(S): 213 SOLUTION TOPICAL at 05:12

## 2022-01-01 RX ADMIN — DIVALPROEX SODIUM 250 MILLIGRAM(S): 500 TABLET, DELAYED RELEASE ORAL at 22:46

## 2022-01-01 RX ADMIN — DIVALPROEX SODIUM 250 MILLIGRAM(S): 500 TABLET, DELAYED RELEASE ORAL at 21:42

## 2022-01-01 RX ADMIN — DIVALPROEX SODIUM 250 MILLIGRAM(S): 500 TABLET, DELAYED RELEASE ORAL at 13:27

## 2022-01-01 RX ADMIN — ROBINUL 0.4 MILLIGRAM(S): 0.2 INJECTION INTRAMUSCULAR; INTRAVENOUS at 17:45

## 2022-01-01 RX ADMIN — CARBIDOPA AND LEVODOPA 2.5 TABLET(S): 25; 100 TABLET ORAL at 05:35

## 2022-01-01 RX ADMIN — Medication 5 MILLIGRAM(S): at 12:12

## 2022-01-01 RX ADMIN — Medication 12.5 GRAM(S): at 21:07

## 2022-01-01 RX ADMIN — Medication 9.04 MICROGRAM(S)/KG/MIN: at 09:20

## 2022-01-01 RX ADMIN — HEPARIN SODIUM 5000 UNIT(S): 5000 INJECTION INTRAVENOUS; SUBCUTANEOUS at 17:51

## 2022-01-01 RX ADMIN — Medication 50 MILLILITER(S): at 05:46

## 2022-01-01 RX ADMIN — CHLORHEXIDINE GLUCONATE 1 APPLICATION(S): 213 SOLUTION TOPICAL at 06:59

## 2022-01-01 RX ADMIN — DIVALPROEX SODIUM 250 MILLIGRAM(S): 500 TABLET, DELAYED RELEASE ORAL at 06:23

## 2022-01-01 RX ADMIN — DIVALPROEX SODIUM 125 MILLIGRAM(S): 500 TABLET, DELAYED RELEASE ORAL at 12:00

## 2022-01-01 RX ADMIN — DULOXETINE HYDROCHLORIDE 20 MILLIGRAM(S): 30 CAPSULE, DELAYED RELEASE ORAL at 11:06

## 2022-01-01 RX ADMIN — Medication 2 MILLIGRAM(S): at 13:25

## 2022-01-01 RX ADMIN — PROPOFOL 11.6 MICROGRAM(S)/KG/MIN: 10 INJECTION, EMULSION INTRAVENOUS at 21:12

## 2022-01-01 RX ADMIN — Medication 60 MILLIGRAM(S): at 18:01

## 2022-01-01 RX ADMIN — CARBIDOPA AND LEVODOPA 2.5 TABLET(S): 25; 100 TABLET ORAL at 05:22

## 2022-01-01 RX ADMIN — Medication 2 MILLIGRAM(S): at 05:59

## 2022-01-01 RX ADMIN — Medication 1: at 00:36

## 2022-01-01 RX ADMIN — DIVALPROEX SODIUM 250 MILLIGRAM(S): 500 TABLET, DELAYED RELEASE ORAL at 13:44

## 2022-01-01 RX ADMIN — Medication 30 MICROGRAM(S): at 21:51

## 2022-01-01 RX ADMIN — DULOXETINE HYDROCHLORIDE 20 MILLIGRAM(S): 30 CAPSULE, DELAYED RELEASE ORAL at 11:23

## 2022-01-01 RX ADMIN — DULOXETINE HYDROCHLORIDE 20 MILLIGRAM(S): 30 CAPSULE, DELAYED RELEASE ORAL at 11:39

## 2022-01-01 RX ADMIN — CARBIDOPA AND LEVODOPA 2.5 TABLET(S): 25; 100 TABLET ORAL at 14:28

## 2022-01-01 RX ADMIN — CARBIDOPA AND LEVODOPA 2 TABLET(S): 25; 100 TABLET ORAL at 00:05

## 2022-01-01 RX ADMIN — MEMANTINE HYDROCHLORIDE 5 MILLIGRAM(S): 10 TABLET ORAL at 00:04

## 2022-01-01 RX ADMIN — DIVALPROEX SODIUM 250 MILLIGRAM(S): 500 TABLET, DELAYED RELEASE ORAL at 05:55

## 2022-01-01 RX ADMIN — MUPIROCIN 1 APPLICATION(S): 20 OINTMENT TOPICAL at 20:51

## 2022-01-01 RX ADMIN — Medication 125 MILLIGRAM(S): at 05:00

## 2022-01-01 RX ADMIN — Medication 1000 UNIT(S): at 12:40

## 2022-01-01 RX ADMIN — Medication 50 MILLIGRAM(S): at 09:18

## 2022-01-01 RX ADMIN — DULOXETINE HYDROCHLORIDE 20 MILLIGRAM(S): 30 CAPSULE, DELAYED RELEASE ORAL at 11:36

## 2022-01-01 RX ADMIN — PANTOPRAZOLE SODIUM 40 MILLIGRAM(S): 20 TABLET, DELAYED RELEASE ORAL at 18:06

## 2022-01-01 RX ADMIN — PANTOPRAZOLE SODIUM 10 MG/HR: 20 TABLET, DELAYED RELEASE ORAL at 09:21

## 2022-01-01 RX ADMIN — Medication 2 MILLIGRAM(S): at 13:15

## 2022-01-01 RX ADMIN — Medication 125 MILLIGRAM(S): at 18:02

## 2022-01-01 RX ADMIN — MUPIROCIN 1 APPLICATION(S): 20 OINTMENT TOPICAL at 18:20

## 2022-01-01 RX ADMIN — PANTOPRAZOLE SODIUM 40 MILLIGRAM(S): 20 TABLET, DELAYED RELEASE ORAL at 17:32

## 2022-01-01 RX ADMIN — Medication 2: at 00:10

## 2022-01-01 RX ADMIN — Medication 5 MILLIGRAM(S): at 00:20

## 2022-01-01 RX ADMIN — Medication 50 MILLIGRAM(S): at 22:33

## 2022-01-01 RX ADMIN — Medication 2 MILLIGRAM(S): at 21:40

## 2022-01-01 RX ADMIN — CARBIDOPA AND LEVODOPA 2.5 TABLET(S): 25; 100 TABLET ORAL at 05:00

## 2022-01-01 RX ADMIN — Medication 2 MILLIGRAM(S): at 21:54

## 2022-01-01 RX ADMIN — Medication 25 MICROGRAM(S): at 06:09

## 2022-01-01 RX ADMIN — QUETIAPINE FUMARATE 25 MILLIGRAM(S): 200 TABLET, FILM COATED ORAL at 22:28

## 2022-01-01 RX ADMIN — HEPARIN SODIUM 5000 UNIT(S): 5000 INJECTION INTRAVENOUS; SUBCUTANEOUS at 17:59

## 2022-01-01 RX ADMIN — MIRTAZAPINE 7.5 MILLIGRAM(S): 45 TABLET, ORALLY DISINTEGRATING ORAL at 11:28

## 2022-01-01 RX ADMIN — MUPIROCIN 1 APPLICATION(S): 20 OINTMENT TOPICAL at 06:47

## 2022-01-01 RX ADMIN — Medication 2 MILLIGRAM(S): at 05:32

## 2022-01-01 RX ADMIN — Medication 5 MILLIGRAM(S): at 18:34

## 2022-01-01 RX ADMIN — Medication 2 MILLIGRAM(S): at 13:08

## 2022-01-01 RX ADMIN — ERYTHROPOIETIN 14000 UNIT(S): 10000 INJECTION, SOLUTION INTRAVENOUS; SUBCUTANEOUS at 15:11

## 2022-01-01 RX ADMIN — DULOXETINE HYDROCHLORIDE 20 MILLIGRAM(S): 30 CAPSULE, DELAYED RELEASE ORAL at 12:40

## 2022-01-01 RX ADMIN — ROBINUL 0.4 MILLIGRAM(S): 0.2 INJECTION INTRAMUSCULAR; INTRAVENOUS at 11:26

## 2022-01-01 RX ADMIN — QUETIAPINE FUMARATE 25 MILLIGRAM(S): 200 TABLET, FILM COATED ORAL at 21:43

## 2022-01-01 RX ADMIN — CHLORHEXIDINE GLUCONATE 15 MILLILITER(S): 213 SOLUTION TOPICAL at 05:36

## 2022-01-01 RX ADMIN — Medication 1: at 07:54

## 2022-01-01 RX ADMIN — Medication 1 TABLET(S): at 11:18

## 2022-01-01 RX ADMIN — Medication 1 TABLET(S): at 11:28

## 2022-01-01 RX ADMIN — CARBIDOPA AND LEVODOPA 2 TABLET(S): 25; 100 TABLET ORAL at 21:27

## 2022-01-01 RX ADMIN — Medication 50 MICROGRAM(S): at 06:33

## 2022-01-01 RX ADMIN — PANTOPRAZOLE SODIUM 40 MILLIGRAM(S): 20 TABLET, DELAYED RELEASE ORAL at 05:55

## 2022-01-01 RX ADMIN — MIRTAZAPINE 7.5 MILLIGRAM(S): 45 TABLET, ORALLY DISINTEGRATING ORAL at 11:51

## 2022-01-01 RX ADMIN — ATORVASTATIN CALCIUM 80 MILLIGRAM(S): 80 TABLET, FILM COATED ORAL at 21:22

## 2022-01-01 RX ADMIN — Medication 1: at 17:57

## 2022-01-01 RX ADMIN — CHLORHEXIDINE GLUCONATE 15 MILLILITER(S): 213 SOLUTION TOPICAL at 05:33

## 2022-01-01 RX ADMIN — DULOXETINE HYDROCHLORIDE 20 MILLIGRAM(S): 30 CAPSULE, DELAYED RELEASE ORAL at 12:21

## 2022-01-01 RX ADMIN — ATORVASTATIN CALCIUM 80 MILLIGRAM(S): 80 TABLET, FILM COATED ORAL at 22:01

## 2022-01-01 RX ADMIN — Medication 1: at 12:25

## 2022-01-01 RX ADMIN — Medication 2 MILLIGRAM(S): at 06:12

## 2022-01-01 RX ADMIN — CINACALCET 60 MILLIGRAM(S): 30 TABLET, FILM COATED ORAL at 11:06

## 2022-01-01 RX ADMIN — CINACALCET 60 MILLIGRAM(S): 30 TABLET, FILM COATED ORAL at 18:58

## 2022-01-01 RX ADMIN — SODIUM CHLORIDE 20 MILLILITER(S): 9 INJECTION, SOLUTION INTRAVENOUS at 05:59

## 2022-01-01 RX ADMIN — Medication 2 MILLIGRAM(S): at 06:42

## 2022-01-01 RX ADMIN — CHLORHEXIDINE GLUCONATE 1 APPLICATION(S): 213 SOLUTION TOPICAL at 06:25

## 2022-01-01 RX ADMIN — Medication 25 MICROGRAM(S): at 22:52

## 2022-01-01 RX ADMIN — Medication 5 MILLIGRAM(S): at 17:10

## 2022-01-01 RX ADMIN — QUETIAPINE FUMARATE 25 MILLIGRAM(S): 200 TABLET, FILM COATED ORAL at 21:35

## 2022-01-01 RX ADMIN — DIVALPROEX SODIUM 250 MILLIGRAM(S): 500 TABLET, DELAYED RELEASE ORAL at 06:19

## 2022-01-01 RX ADMIN — Medication 1 MILLIGRAM(S): at 22:12

## 2022-01-01 RX ADMIN — Medication 25 MICROGRAM(S): at 05:11

## 2022-01-01 RX ADMIN — Medication 1 MILLIGRAM(S): at 12:16

## 2022-01-01 RX ADMIN — Medication 2: at 17:44

## 2022-01-01 RX ADMIN — MIDODRINE HYDROCHLORIDE 20 MILLIGRAM(S): 2.5 TABLET ORAL at 09:01

## 2022-01-01 RX ADMIN — POLYETHYLENE GLYCOL 3350 17 GRAM(S): 17 POWDER, FOR SOLUTION ORAL at 05:07

## 2022-01-01 RX ADMIN — MUPIROCIN 1 APPLICATION(S): 20 OINTMENT TOPICAL at 08:30

## 2022-01-01 RX ADMIN — Medication 125 MILLIGRAM(S): at 13:50

## 2022-01-01 RX ADMIN — CHLORHEXIDINE GLUCONATE 15 MILLILITER(S): 213 SOLUTION TOPICAL at 18:18

## 2022-01-01 RX ADMIN — DULOXETINE HYDROCHLORIDE 20 MILLIGRAM(S): 30 CAPSULE, DELAYED RELEASE ORAL at 11:57

## 2022-01-01 RX ADMIN — Medication 2 MILLIGRAM(S): at 15:05

## 2022-01-01 RX ADMIN — MUPIROCIN 1 APPLICATION(S): 20 OINTMENT TOPICAL at 06:27

## 2022-01-01 RX ADMIN — PANTOPRAZOLE SODIUM 10 MG/HR: 20 TABLET, DELAYED RELEASE ORAL at 21:51

## 2022-01-01 RX ADMIN — Medication 50 MILLIGRAM(S): at 21:35

## 2022-01-01 RX ADMIN — MUPIROCIN 1 APPLICATION(S): 20 OINTMENT TOPICAL at 17:03

## 2022-01-01 RX ADMIN — DIVALPROEX SODIUM 250 MILLIGRAM(S): 500 TABLET, DELAYED RELEASE ORAL at 22:03

## 2022-01-01 RX ADMIN — Medication 1000 UNIT(S): at 11:57

## 2022-01-01 RX ADMIN — DULOXETINE HYDROCHLORIDE 20 MILLIGRAM(S): 30 CAPSULE, DELAYED RELEASE ORAL at 13:07

## 2022-01-01 RX ADMIN — Medication 0.5 MILLIGRAM(S): at 06:17

## 2022-01-01 RX ADMIN — Medication 1 MILLIGRAM(S): at 12:41

## 2022-01-01 RX ADMIN — DULOXETINE HYDROCHLORIDE 20 MILLIGRAM(S): 30 CAPSULE, DELAYED RELEASE ORAL at 12:28

## 2022-01-01 RX ADMIN — MIDODRINE HYDROCHLORIDE 15 MILLIGRAM(S): 2.5 TABLET ORAL at 01:10

## 2022-01-01 RX ADMIN — HEPARIN SODIUM 5000 UNIT(S): 5000 INJECTION INTRAVENOUS; SUBCUTANEOUS at 05:33

## 2022-01-01 RX ADMIN — MEMANTINE HYDROCHLORIDE 5 MILLIGRAM(S): 10 TABLET ORAL at 22:36

## 2022-01-01 RX ADMIN — CARBIDOPA AND LEVODOPA 2 TABLET(S): 25; 100 TABLET ORAL at 22:24

## 2022-01-01 RX ADMIN — Medication 25 MICROGRAM(S): at 06:21

## 2022-01-01 RX ADMIN — BUMETANIDE 2 MILLIGRAM(S): 0.25 INJECTION INTRAMUSCULAR; INTRAVENOUS at 18:04

## 2022-01-01 RX ADMIN — Medication 1: at 18:05

## 2022-01-01 RX ADMIN — Medication 50 MILLIGRAM(S): at 11:31

## 2022-01-01 RX ADMIN — Medication 1 MILLIGRAM(S): at 12:11

## 2022-01-01 RX ADMIN — CHLORHEXIDINE GLUCONATE 1 APPLICATION(S): 213 SOLUTION TOPICAL at 06:10

## 2022-01-01 RX ADMIN — CHLORHEXIDINE GLUCONATE 1 APPLICATION(S): 213 SOLUTION TOPICAL at 05:42

## 2022-01-01 RX ADMIN — Medication 2 MILLIGRAM(S): at 06:25

## 2022-01-01 RX ADMIN — CHLORHEXIDINE GLUCONATE 15 MILLILITER(S): 213 SOLUTION TOPICAL at 17:43

## 2022-01-01 RX ADMIN — POLYETHYLENE GLYCOL 3350 17 GRAM(S): 17 POWDER, FOR SOLUTION ORAL at 05:17

## 2022-01-01 RX ADMIN — CHLORHEXIDINE GLUCONATE 1 APPLICATION(S): 213 SOLUTION TOPICAL at 08:47

## 2022-01-01 RX ADMIN — DEXMEDETOMIDINE HYDROCHLORIDE IN 0.9% SODIUM CHLORIDE 9.64 MICROGRAM(S)/KG/HR: 4 INJECTION INTRAVENOUS at 09:21

## 2022-01-01 RX ADMIN — Medication 2 MILLIGRAM(S): at 21:27

## 2022-01-01 RX ADMIN — SENNA PLUS 10 MILLILITER(S): 8.6 TABLET ORAL at 22:12

## 2022-01-01 RX ADMIN — Medication 14.5 MICROGRAM(S)/KG/MIN: at 09:14

## 2022-01-01 RX ADMIN — PIPERACILLIN AND TAZOBACTAM 200 GRAM(S): 4; .5 INJECTION, POWDER, LYOPHILIZED, FOR SOLUTION INTRAVENOUS at 17:02

## 2022-01-01 RX ADMIN — Medication 125 MILLIGRAM(S): at 00:21

## 2022-01-01 RX ADMIN — Medication 2 MILLIGRAM(S): at 06:09

## 2022-01-01 RX ADMIN — Medication 5 MILLIGRAM(S): at 12:07

## 2022-01-01 RX ADMIN — HEPARIN SODIUM 5000 UNIT(S): 5000 INJECTION INTRAVENOUS; SUBCUTANEOUS at 06:13

## 2022-01-01 RX ADMIN — DIVALPROEX SODIUM 250 MILLIGRAM(S): 500 TABLET, DELAYED RELEASE ORAL at 13:43

## 2022-01-01 RX ADMIN — Medication 125 MILLIGRAM(S): at 05:21

## 2022-01-01 RX ADMIN — Medication 1: at 06:11

## 2022-01-01 RX ADMIN — CARBIDOPA AND LEVODOPA 2.5 TABLET(S): 25; 100 TABLET ORAL at 14:02

## 2022-01-01 RX ADMIN — CARBIDOPA AND LEVODOPA 2.5 TABLET(S): 25; 100 TABLET ORAL at 15:39

## 2022-01-01 RX ADMIN — CARBIDOPA AND LEVODOPA 2.5 TABLET(S): 25; 100 TABLET ORAL at 14:57

## 2022-01-01 RX ADMIN — Medication 2 MILLIGRAM(S): at 22:00

## 2022-01-01 RX ADMIN — DIVALPROEX SODIUM 250 MILLIGRAM(S): 500 TABLET, DELAYED RELEASE ORAL at 18:40

## 2022-01-01 RX ADMIN — CARBIDOPA AND LEVODOPA 2 TABLET(S): 25; 100 TABLET ORAL at 21:44

## 2022-01-01 RX ADMIN — MIRTAZAPINE 7.5 MILLIGRAM(S): 45 TABLET, ORALLY DISINTEGRATING ORAL at 13:26

## 2022-01-01 RX ADMIN — QUETIAPINE FUMARATE 50 MILLIGRAM(S): 200 TABLET, FILM COATED ORAL at 22:48

## 2022-01-01 RX ADMIN — QUETIAPINE FUMARATE 25 MILLIGRAM(S): 200 TABLET, FILM COATED ORAL at 05:09

## 2022-01-01 RX ADMIN — CARBIDOPA AND LEVODOPA 2.5 TABLET(S): 25; 100 TABLET ORAL at 15:09

## 2022-01-01 RX ADMIN — DIVALPROEX SODIUM 250 MILLIGRAM(S): 500 TABLET, DELAYED RELEASE ORAL at 13:02

## 2022-01-01 RX ADMIN — Medication 1 TABLET(S): at 11:12

## 2022-01-01 RX ADMIN — QUETIAPINE FUMARATE 25 MILLIGRAM(S): 200 TABLET, FILM COATED ORAL at 05:13

## 2022-01-01 RX ADMIN — Medication 1: at 11:56

## 2022-01-01 RX ADMIN — Medication 1 PACKET(S): at 21:55

## 2022-01-01 RX ADMIN — Medication 125 MILLIGRAM(S): at 12:34

## 2022-01-01 RX ADMIN — PANTOPRAZOLE SODIUM 40 MILLIGRAM(S): 20 TABLET, DELAYED RELEASE ORAL at 17:56

## 2022-01-01 RX ADMIN — CARBIDOPA AND LEVODOPA 2 TABLET(S): 25; 100 TABLET ORAL at 22:21

## 2022-01-01 RX ADMIN — HEPARIN SODIUM 5000 UNIT(S): 5000 INJECTION INTRAVENOUS; SUBCUTANEOUS at 18:06

## 2022-01-01 RX ADMIN — ATORVASTATIN CALCIUM 80 MILLIGRAM(S): 80 TABLET, FILM COATED ORAL at 22:26

## 2022-01-01 RX ADMIN — CHLORHEXIDINE GLUCONATE 1 APPLICATION(S): 213 SOLUTION TOPICAL at 05:25

## 2022-01-01 RX ADMIN — Medication 125 MILLIGRAM(S): at 17:07

## 2022-01-01 RX ADMIN — HEPARIN SODIUM 5000 UNIT(S): 5000 INJECTION INTRAVENOUS; SUBCUTANEOUS at 06:04

## 2022-01-01 RX ADMIN — DIVALPROEX SODIUM 250 MILLIGRAM(S): 500 TABLET, DELAYED RELEASE ORAL at 21:40

## 2022-01-01 RX ADMIN — Medication 50 MILLIGRAM(S): at 21:43

## 2022-01-01 RX ADMIN — Medication 5 MILLIGRAM(S): at 00:46

## 2022-01-01 RX ADMIN — HEPARIN SODIUM 5000 UNIT(S): 5000 INJECTION INTRAVENOUS; SUBCUTANEOUS at 17:21

## 2022-01-01 RX ADMIN — Medication 14.5 MICROGRAM(S)/KG/MIN: at 12:15

## 2022-01-01 RX ADMIN — PANTOPRAZOLE SODIUM 40 MILLIGRAM(S): 20 TABLET, DELAYED RELEASE ORAL at 05:43

## 2022-01-01 RX ADMIN — Medication 2 MILLIGRAM(S): at 05:21

## 2022-01-01 RX ADMIN — QUETIAPINE FUMARATE 25 MILLIGRAM(S): 200 TABLET, FILM COATED ORAL at 13:21

## 2022-01-01 RX ADMIN — HEPARIN SODIUM 5000 UNIT(S): 5000 INJECTION INTRAVENOUS; SUBCUTANEOUS at 18:57

## 2022-01-01 RX ADMIN — DIVALPROEX SODIUM 250 MILLIGRAM(S): 500 TABLET, DELAYED RELEASE ORAL at 22:26

## 2022-01-01 RX ADMIN — QUETIAPINE FUMARATE 25 MILLIGRAM(S): 200 TABLET, FILM COATED ORAL at 06:23

## 2022-01-01 RX ADMIN — DULOXETINE HYDROCHLORIDE 20 MILLIGRAM(S): 30 CAPSULE, DELAYED RELEASE ORAL at 11:16

## 2022-01-01 RX ADMIN — DULOXETINE HYDROCHLORIDE 20 MILLIGRAM(S): 30 CAPSULE, DELAYED RELEASE ORAL at 11:58

## 2022-01-01 RX ADMIN — DIVALPROEX SODIUM 250 MILLIGRAM(S): 500 TABLET, DELAYED RELEASE ORAL at 14:40

## 2022-01-01 RX ADMIN — CINACALCET 60 MILLIGRAM(S): 30 TABLET, FILM COATED ORAL at 11:59

## 2022-01-01 RX ADMIN — CARBIDOPA AND LEVODOPA 2 TABLET(S): 25; 100 TABLET ORAL at 22:35

## 2022-01-01 RX ADMIN — PANTOPRAZOLE SODIUM 40 MILLIGRAM(S): 20 TABLET, DELAYED RELEASE ORAL at 18:26

## 2022-01-01 RX ADMIN — Medication 1 MILLIGRAM(S): at 00:21

## 2022-01-01 RX ADMIN — CARBIDOPA AND LEVODOPA 2.5 TABLET(S): 25; 100 TABLET ORAL at 05:32

## 2022-01-01 RX ADMIN — CARBIDOPA AND LEVODOPA 2.5 TABLET(S): 25; 100 TABLET ORAL at 14:16

## 2022-01-01 RX ADMIN — MUPIROCIN 1 APPLICATION(S): 20 OINTMENT TOPICAL at 17:48

## 2022-01-01 RX ADMIN — Medication 5 MILLIGRAM(S): at 17:21

## 2022-01-01 RX ADMIN — Medication 0.5 MILLIGRAM(S): at 11:17

## 2022-01-01 RX ADMIN — CHLORHEXIDINE GLUCONATE 1 APPLICATION(S): 213 SOLUTION TOPICAL at 06:46

## 2022-01-01 RX ADMIN — CARBIDOPA AND LEVODOPA 2.5 TABLET(S): 25; 100 TABLET ORAL at 05:33

## 2022-01-01 RX ADMIN — Medication 5 MILLIGRAM(S): at 11:46

## 2022-01-01 RX ADMIN — ATORVASTATIN CALCIUM 80 MILLIGRAM(S): 80 TABLET, FILM COATED ORAL at 22:27

## 2022-01-01 RX ADMIN — PIPERACILLIN AND TAZOBACTAM 25 GRAM(S): 4; .5 INJECTION, POWDER, LYOPHILIZED, FOR SOLUTION INTRAVENOUS at 15:16

## 2022-01-01 RX ADMIN — DIVALPROEX SODIUM 250 MILLIGRAM(S): 500 TABLET, DELAYED RELEASE ORAL at 22:00

## 2022-01-01 RX ADMIN — DIVALPROEX SODIUM 250 MILLIGRAM(S): 500 TABLET, DELAYED RELEASE ORAL at 21:04

## 2022-01-01 RX ADMIN — Medication 2: at 16:52

## 2022-01-01 RX ADMIN — DIVALPROEX SODIUM 250 MILLIGRAM(S): 500 TABLET, DELAYED RELEASE ORAL at 13:08

## 2022-01-01 RX ADMIN — DEXMEDETOMIDINE HYDROCHLORIDE IN 0.9% SODIUM CHLORIDE 9.64 MICROGRAM(S)/KG/HR: 4 INJECTION INTRAVENOUS at 07:41

## 2022-01-01 RX ADMIN — HEPARIN SODIUM 5000 UNIT(S): 5000 INJECTION INTRAVENOUS; SUBCUTANEOUS at 17:53

## 2022-01-01 RX ADMIN — Medication 1: at 08:05

## 2022-01-01 RX ADMIN — MEMANTINE HYDROCHLORIDE 5 MILLIGRAM(S): 10 TABLET ORAL at 23:56

## 2022-01-01 RX ADMIN — CARBIDOPA AND LEVODOPA 2 TABLET(S): 25; 100 TABLET ORAL at 22:00

## 2022-01-01 RX ADMIN — CINACALCET 60 MILLIGRAM(S): 30 TABLET, FILM COATED ORAL at 12:19

## 2022-01-01 RX ADMIN — MUPIROCIN 1 APPLICATION(S): 20 OINTMENT TOPICAL at 20:05

## 2022-01-01 RX ADMIN — Medication 2 MILLIGRAM(S): at 14:15

## 2022-01-01 RX ADMIN — Medication 1 TABLET(S): at 13:25

## 2022-01-01 RX ADMIN — DIVALPROEX SODIUM 125 MILLIGRAM(S): 500 TABLET, DELAYED RELEASE ORAL at 14:25

## 2022-01-01 RX ADMIN — Medication 1: at 12:44

## 2022-01-01 RX ADMIN — HEPARIN SODIUM 5000 UNIT(S): 5000 INJECTION INTRAVENOUS; SUBCUTANEOUS at 06:07

## 2022-01-01 RX ADMIN — Medication 2 MILLIGRAM(S): at 23:52

## 2022-01-01 RX ADMIN — Medication 5 MILLIGRAM(S): at 06:13

## 2022-01-01 RX ADMIN — PANTOPRAZOLE SODIUM 40 MILLIGRAM(S): 20 TABLET, DELAYED RELEASE ORAL at 17:51

## 2022-01-01 RX ADMIN — Medication 1000 UNIT(S): at 11:14

## 2022-01-01 RX ADMIN — QUETIAPINE FUMARATE 12.5 MILLIGRAM(S): 200 TABLET, FILM COATED ORAL at 06:06

## 2022-01-01 RX ADMIN — ATORVASTATIN CALCIUM 80 MILLIGRAM(S): 80 TABLET, FILM COATED ORAL at 00:27

## 2022-01-01 RX ADMIN — Medication 50 MILLIGRAM(S): at 12:45

## 2022-01-01 RX ADMIN — PANTOPRAZOLE SODIUM 40 MILLIGRAM(S): 20 TABLET, DELAYED RELEASE ORAL at 05:24

## 2022-01-01 RX ADMIN — Medication 125 MILLILITER(S): at 12:09

## 2022-01-01 RX ADMIN — HEPARIN SODIUM 5000 UNIT(S): 5000 INJECTION INTRAVENOUS; SUBCUTANEOUS at 06:27

## 2022-01-01 RX ADMIN — Medication 5 MILLIGRAM(S): at 05:04

## 2022-01-01 RX ADMIN — Medication 2 MILLIGRAM(S): at 22:46

## 2022-01-01 RX ADMIN — HYDROMORPHONE HYDROCHLORIDE 1 MILLIGRAM(S): 2 INJECTION INTRAMUSCULAR; INTRAVENOUS; SUBCUTANEOUS at 13:30

## 2022-01-01 RX ADMIN — CHLORHEXIDINE GLUCONATE 1 APPLICATION(S): 213 SOLUTION TOPICAL at 06:26

## 2022-01-01 RX ADMIN — Medication 1: at 11:52

## 2022-01-01 RX ADMIN — PANTOPRAZOLE SODIUM 40 MILLIGRAM(S): 20 TABLET, DELAYED RELEASE ORAL at 06:10

## 2022-01-01 RX ADMIN — CARBIDOPA AND LEVODOPA 2.5 TABLET(S): 25; 100 TABLET ORAL at 13:24

## 2022-01-01 RX ADMIN — Medication 2: at 18:10

## 2022-01-01 RX ADMIN — Medication 2 MILLIGRAM(S): at 22:14

## 2022-01-01 RX ADMIN — DIVALPROEX SODIUM 250 MILLIGRAM(S): 500 TABLET, DELAYED RELEASE ORAL at 11:48

## 2022-01-01 RX ADMIN — CHLORHEXIDINE GLUCONATE 1 APPLICATION(S): 213 SOLUTION TOPICAL at 05:11

## 2022-01-01 RX ADMIN — TAMSULOSIN HYDROCHLORIDE 0.4 MILLIGRAM(S): 0.4 CAPSULE ORAL at 22:03

## 2022-01-01 RX ADMIN — PANTOPRAZOLE SODIUM 40 MILLIGRAM(S): 20 TABLET, DELAYED RELEASE ORAL at 18:57

## 2022-01-01 RX ADMIN — TAMSULOSIN HYDROCHLORIDE 0.4 MILLIGRAM(S): 0.4 CAPSULE ORAL at 21:27

## 2022-01-01 RX ADMIN — DIVALPROEX SODIUM 250 MILLIGRAM(S): 500 TABLET, DELAYED RELEASE ORAL at 05:17

## 2022-01-01 RX ADMIN — Medication 1000 UNIT(S): at 13:14

## 2022-01-01 RX ADMIN — CARBIDOPA AND LEVODOPA 2 TABLET(S): 25; 100 TABLET ORAL at 22:05

## 2022-01-01 RX ADMIN — CINACALCET 60 MILLIGRAM(S): 30 TABLET, FILM COATED ORAL at 13:25

## 2022-01-01 RX ADMIN — CARBIDOPA AND LEVODOPA 2.5 TABLET(S): 25; 100 TABLET ORAL at 12:44

## 2022-01-01 RX ADMIN — DIVALPROEX SODIUM 250 MILLIGRAM(S): 500 TABLET, DELAYED RELEASE ORAL at 05:15

## 2022-01-01 RX ADMIN — Medication 125 MILLIGRAM(S): at 05:35

## 2022-01-01 RX ADMIN — INSULIN GLARGINE 6 UNIT(S): 100 INJECTION, SOLUTION SUBCUTANEOUS at 21:57

## 2022-01-01 RX ADMIN — ATORVASTATIN CALCIUM 80 MILLIGRAM(S): 80 TABLET, FILM COATED ORAL at 23:13

## 2022-01-01 RX ADMIN — HEPARIN SODIUM 5000 UNIT(S): 5000 INJECTION INTRAVENOUS; SUBCUTANEOUS at 18:30

## 2022-01-01 RX ADMIN — CHLORHEXIDINE GLUCONATE 15 MILLILITER(S): 213 SOLUTION TOPICAL at 17:03

## 2022-01-01 RX ADMIN — Medication 1 MILLIGRAM(S): at 11:06

## 2022-01-01 RX ADMIN — HEPARIN SODIUM 5000 UNIT(S): 5000 INJECTION INTRAVENOUS; SUBCUTANEOUS at 05:28

## 2022-01-01 RX ADMIN — Medication 1: at 17:32

## 2022-01-01 RX ADMIN — Medication 2 MILLIGRAM(S): at 05:17

## 2022-01-01 RX ADMIN — Medication 2 MILLIGRAM(S): at 13:18

## 2022-01-01 RX ADMIN — DIVALPROEX SODIUM 250 MILLIGRAM(S): 500 TABLET, DELAYED RELEASE ORAL at 13:23

## 2022-01-01 RX ADMIN — MUPIROCIN 1 APPLICATION(S): 20 OINTMENT TOPICAL at 06:17

## 2022-01-01 RX ADMIN — HEPARIN SODIUM 5000 UNIT(S): 5000 INJECTION INTRAVENOUS; SUBCUTANEOUS at 05:12

## 2022-01-01 RX ADMIN — Medication 2 MILLIGRAM(S): at 15:26

## 2022-01-01 RX ADMIN — DIVALPROEX SODIUM 250 MILLIGRAM(S): 500 TABLET, DELAYED RELEASE ORAL at 05:29

## 2022-01-01 RX ADMIN — MEMANTINE HYDROCHLORIDE 5 MILLIGRAM(S): 10 TABLET ORAL at 22:54

## 2022-01-01 RX ADMIN — Medication 125 MILLIGRAM(S): at 17:56

## 2022-01-01 RX ADMIN — CARBIDOPA AND LEVODOPA 2.5 TABLET(S): 25; 100 TABLET ORAL at 13:08

## 2022-01-01 RX ADMIN — DIVALPROEX SODIUM 250 MILLIGRAM(S): 500 TABLET, DELAYED RELEASE ORAL at 06:24

## 2022-01-01 RX ADMIN — QUETIAPINE FUMARATE 12.5 MILLIGRAM(S): 200 TABLET, FILM COATED ORAL at 11:47

## 2022-01-01 RX ADMIN — Medication 1 MILLIGRAM(S): at 13:27

## 2022-01-01 RX ADMIN — Medication 2 MILLIGRAM(S): at 05:40

## 2022-01-01 RX ADMIN — CARBIDOPA AND LEVODOPA 2.5 TABLET(S): 25; 100 TABLET ORAL at 13:28

## 2022-01-01 RX ADMIN — DIVALPROEX SODIUM 250 MILLIGRAM(S): 500 TABLET, DELAYED RELEASE ORAL at 14:15

## 2022-01-01 RX ADMIN — MIDODRINE HYDROCHLORIDE 15 MILLIGRAM(S): 2.5 TABLET ORAL at 18:19

## 2022-01-01 RX ADMIN — Medication 125 MILLIGRAM(S): at 11:54

## 2022-01-01 RX ADMIN — ATORVASTATIN CALCIUM 80 MILLIGRAM(S): 80 TABLET, FILM COATED ORAL at 22:34

## 2022-01-01 RX ADMIN — DIVALPROEX SODIUM 250 MILLIGRAM(S): 500 TABLET, DELAYED RELEASE ORAL at 05:42

## 2022-01-01 RX ADMIN — DIVALPROEX SODIUM 250 MILLIGRAM(S): 500 TABLET, DELAYED RELEASE ORAL at 06:57

## 2022-01-01 RX ADMIN — DULOXETINE HYDROCHLORIDE 20 MILLIGRAM(S): 30 CAPSULE, DELAYED RELEASE ORAL at 17:53

## 2022-01-01 RX ADMIN — Medication 1 MILLIGRAM(S): at 13:50

## 2022-01-01 RX ADMIN — CARBIDOPA AND LEVODOPA 2 TABLET(S): 25; 100 TABLET ORAL at 21:36

## 2022-01-01 RX ADMIN — DULOXETINE HYDROCHLORIDE 20 MILLIGRAM(S): 30 CAPSULE, DELAYED RELEASE ORAL at 12:09

## 2022-01-01 RX ADMIN — DIVALPROEX SODIUM 250 MILLIGRAM(S): 500 TABLET, DELAYED RELEASE ORAL at 05:24

## 2022-01-01 RX ADMIN — CARBIDOPA AND LEVODOPA 2.5 TABLET(S): 25; 100 TABLET ORAL at 05:07

## 2022-01-01 RX ADMIN — DIVALPROEX SODIUM 250 MILLIGRAM(S): 500 TABLET, DELAYED RELEASE ORAL at 07:20

## 2022-01-01 RX ADMIN — DIVALPROEX SODIUM 250 MILLIGRAM(S): 500 TABLET, DELAYED RELEASE ORAL at 14:58

## 2022-01-01 RX ADMIN — ATORVASTATIN CALCIUM 80 MILLIGRAM(S): 80 TABLET, FILM COATED ORAL at 22:18

## 2022-01-01 RX ADMIN — Medication 1 MILLIGRAM(S): at 12:44

## 2022-01-01 RX ADMIN — DIVALPROEX SODIUM 250 MILLIGRAM(S): 500 TABLET, DELAYED RELEASE ORAL at 06:15

## 2022-01-01 RX ADMIN — DIVALPROEX SODIUM 250 MILLIGRAM(S): 500 TABLET, DELAYED RELEASE ORAL at 06:07

## 2022-01-01 RX ADMIN — ROBINUL 0.4 MILLIGRAM(S): 0.2 INJECTION INTRAMUSCULAR; INTRAVENOUS at 11:30

## 2022-01-01 RX ADMIN — Medication 50 MILLIGRAM(S): at 21:55

## 2022-01-01 RX ADMIN — Medication 1 MILLIGRAM(S): at 12:26

## 2022-01-01 RX ADMIN — PIPERACILLIN AND TAZOBACTAM 25 GRAM(S): 4; .5 INJECTION, POWDER, LYOPHILIZED, FOR SOLUTION INTRAVENOUS at 12:26

## 2022-01-01 RX ADMIN — ERYTHROPOIETIN 12000 UNIT(S): 10000 INJECTION, SOLUTION INTRAVENOUS; SUBCUTANEOUS at 09:51

## 2022-01-01 RX ADMIN — CINACALCET 60 MILLIGRAM(S): 30 TABLET, FILM COATED ORAL at 12:00

## 2022-01-01 RX ADMIN — Medication 2 MILLIGRAM(S): at 06:27

## 2022-01-01 RX ADMIN — Medication 50 MILLIGRAM(S): at 12:28

## 2022-01-01 RX ADMIN — Medication 50 MILLIGRAM(S): at 11:42

## 2022-01-01 RX ADMIN — MIDODRINE HYDROCHLORIDE 15 MILLIGRAM(S): 2.5 TABLET ORAL at 09:14

## 2022-01-01 RX ADMIN — CHLORHEXIDINE GLUCONATE 1 APPLICATION(S): 213 SOLUTION TOPICAL at 05:23

## 2022-01-01 RX ADMIN — CARBIDOPA AND LEVODOPA 2 TABLET(S): 25; 100 TABLET ORAL at 21:53

## 2022-01-01 RX ADMIN — CINACALCET 60 MILLIGRAM(S): 30 TABLET, FILM COATED ORAL at 12:59

## 2022-01-01 RX ADMIN — HEPARIN SODIUM 5000 UNIT(S): 5000 INJECTION INTRAVENOUS; SUBCUTANEOUS at 17:44

## 2022-01-01 RX ADMIN — SODIUM CHLORIDE 40 MILLILITER(S): 9 INJECTION, SOLUTION INTRAVENOUS at 11:45

## 2022-01-01 RX ADMIN — Medication 125 MILLIGRAM(S): at 00:54

## 2022-01-01 RX ADMIN — Medication 30 MICROGRAM(S): at 22:18

## 2022-01-01 RX ADMIN — Medication 1 MILLIGRAM(S): at 11:38

## 2022-01-01 RX ADMIN — Medication 50 MILLIGRAM(S): at 11:59

## 2022-01-01 RX ADMIN — TAMSULOSIN HYDROCHLORIDE 0.4 MILLIGRAM(S): 0.4 CAPSULE ORAL at 22:36

## 2022-01-01 RX ADMIN — Medication 125 MILLIGRAM(S): at 00:50

## 2022-01-01 RX ADMIN — CARBIDOPA AND LEVODOPA 2.5 TABLET(S): 25; 100 TABLET ORAL at 05:11

## 2022-01-01 RX ADMIN — CARBIDOPA AND LEVODOPA 2.5 TABLET(S): 25; 100 TABLET ORAL at 05:01

## 2022-01-01 RX ADMIN — Medication 1 TABLET(S): at 11:38

## 2022-01-01 RX ADMIN — Medication 125 MILLIGRAM(S): at 01:52

## 2022-01-01 RX ADMIN — CINACALCET 60 MILLIGRAM(S): 30 TABLET, FILM COATED ORAL at 11:31

## 2022-01-01 RX ADMIN — DIVALPROEX SODIUM 250 MILLIGRAM(S): 500 TABLET, DELAYED RELEASE ORAL at 06:53

## 2022-01-01 RX ADMIN — MIRTAZAPINE 7.5 MILLIGRAM(S): 45 TABLET, ORALLY DISINTEGRATING ORAL at 11:57

## 2022-01-01 RX ADMIN — Medication 5 MILLIGRAM(S): at 11:09

## 2022-01-01 RX ADMIN — QUETIAPINE FUMARATE 25 MILLIGRAM(S): 200 TABLET, FILM COATED ORAL at 21:45

## 2022-01-01 RX ADMIN — Medication 2 MILLIGRAM(S): at 21:22

## 2022-01-01 RX ADMIN — Medication 1000 UNIT(S): at 13:53

## 2022-01-01 RX ADMIN — Medication 2 MILLIGRAM(S): at 06:08

## 2022-01-01 RX ADMIN — HEPARIN SODIUM 5000 UNIT(S): 5000 INJECTION INTRAVENOUS; SUBCUTANEOUS at 05:29

## 2022-01-01 RX ADMIN — ATORVASTATIN CALCIUM 80 MILLIGRAM(S): 80 TABLET, FILM COATED ORAL at 21:47

## 2022-01-01 RX ADMIN — Medication 2 MILLIGRAM(S): at 22:13

## 2022-01-01 RX ADMIN — Medication 1 TABLET(S): at 12:03

## 2022-01-01 RX ADMIN — DIVALPROEX SODIUM 250 MILLIGRAM(S): 500 TABLET, DELAYED RELEASE ORAL at 00:05

## 2022-01-01 RX ADMIN — Medication 50 MILLIGRAM(S): at 21:25

## 2022-01-01 RX ADMIN — DIVALPROEX SODIUM 250 MILLIGRAM(S): 500 TABLET, DELAYED RELEASE ORAL at 07:32

## 2022-01-01 RX ADMIN — DIVALPROEX SODIUM 250 MILLIGRAM(S): 500 TABLET, DELAYED RELEASE ORAL at 21:45

## 2022-01-01 RX ADMIN — QUETIAPINE FUMARATE 50 MILLIGRAM(S): 200 TABLET, FILM COATED ORAL at 22:33

## 2022-01-01 RX ADMIN — MEMANTINE HYDROCHLORIDE 5 MILLIGRAM(S): 10 TABLET ORAL at 22:05

## 2022-01-01 RX ADMIN — Medication 50 MILLIGRAM(S): at 22:17

## 2022-01-01 RX ADMIN — PIPERACILLIN AND TAZOBACTAM 25 GRAM(S): 4; .5 INJECTION, POWDER, LYOPHILIZED, FOR SOLUTION INTRAVENOUS at 12:04

## 2022-01-01 RX ADMIN — MIRTAZAPINE 7.5 MILLIGRAM(S): 45 TABLET, ORALLY DISINTEGRATING ORAL at 11:05

## 2022-01-01 RX ADMIN — DIVALPROEX SODIUM 250 MILLIGRAM(S): 500 TABLET, DELAYED RELEASE ORAL at 14:38

## 2022-01-01 RX ADMIN — Medication 50 MILLIGRAM(S): at 12:02

## 2022-01-01 RX ADMIN — ATORVASTATIN CALCIUM 80 MILLIGRAM(S): 80 TABLET, FILM COATED ORAL at 22:05

## 2022-01-01 RX ADMIN — Medication 1: at 19:01

## 2022-01-01 RX ADMIN — Medication 5 MILLIGRAM(S): at 06:58

## 2022-01-01 RX ADMIN — CINACALCET 60 MILLIGRAM(S): 30 TABLET, FILM COATED ORAL at 11:23

## 2022-01-01 RX ADMIN — CHLORHEXIDINE GLUCONATE 15 MILLILITER(S): 213 SOLUTION TOPICAL at 05:50

## 2022-01-01 RX ADMIN — POLYETHYLENE GLYCOL 3350 17 GRAM(S): 17 POWDER, FOR SOLUTION ORAL at 05:35

## 2022-01-01 RX ADMIN — Medication 1: at 17:03

## 2022-01-01 RX ADMIN — CARBIDOPA AND LEVODOPA 2.5 TABLET(S): 25; 100 TABLET ORAL at 15:26

## 2022-01-01 RX ADMIN — Medication 650 MILLIGRAM(S): at 17:36

## 2022-01-01 RX ADMIN — Medication 50 MILLIGRAM(S): at 10:00

## 2022-01-01 RX ADMIN — HEPARIN SODIUM 5000 UNIT(S): 5000 INJECTION INTRAVENOUS; SUBCUTANEOUS at 05:58

## 2022-01-01 RX ADMIN — SENNA PLUS 10 MILLILITER(S): 8.6 TABLET ORAL at 05:53

## 2022-01-01 RX ADMIN — HEPARIN SODIUM 5000 UNIT(S): 5000 INJECTION INTRAVENOUS; SUBCUTANEOUS at 17:39

## 2022-01-01 RX ADMIN — SODIUM CHLORIDE 75 MILLILITER(S): 9 INJECTION INTRAMUSCULAR; INTRAVENOUS; SUBCUTANEOUS at 22:53

## 2022-01-01 RX ADMIN — Medication 50 MILLIGRAM(S): at 11:28

## 2022-01-01 RX ADMIN — MEMANTINE HYDROCHLORIDE 5 MILLIGRAM(S): 10 TABLET ORAL at 22:33

## 2022-01-01 RX ADMIN — PANTOPRAZOLE SODIUM 40 MILLIGRAM(S): 20 TABLET, DELAYED RELEASE ORAL at 06:14

## 2022-01-01 RX ADMIN — HYDROMORPHONE HYDROCHLORIDE 1 MILLIGRAM(S): 2 INJECTION INTRAMUSCULAR; INTRAVENOUS; SUBCUTANEOUS at 11:46

## 2022-01-01 RX ADMIN — MIDODRINE HYDROCHLORIDE 10 MILLIGRAM(S): 2.5 TABLET ORAL at 12:58

## 2022-01-01 RX ADMIN — DULOXETINE HYDROCHLORIDE 20 MILLIGRAM(S): 30 CAPSULE, DELAYED RELEASE ORAL at 13:50

## 2022-01-01 RX ADMIN — MIRTAZAPINE 7.5 MILLIGRAM(S): 45 TABLET, ORALLY DISINTEGRATING ORAL at 16:51

## 2022-01-01 RX ADMIN — Medication 0.5 MILLIGRAM(S): at 11:39

## 2022-01-01 RX ADMIN — CHLORHEXIDINE GLUCONATE 15 MILLILITER(S): 213 SOLUTION TOPICAL at 17:36

## 2022-01-01 RX ADMIN — Medication 50 MILLIGRAM(S): at 12:03

## 2022-01-01 RX ADMIN — QUETIAPINE FUMARATE 12.5 MILLIGRAM(S): 200 TABLET, FILM COATED ORAL at 21:08

## 2022-01-01 RX ADMIN — Medication 1 MILLIGRAM(S): at 12:18

## 2022-01-01 RX ADMIN — DIVALPROEX SODIUM 250 MILLIGRAM(S): 500 TABLET, DELAYED RELEASE ORAL at 21:27

## 2022-01-01 RX ADMIN — Medication 2 MILLIGRAM(S): at 22:26

## 2022-01-01 RX ADMIN — Medication 1000 UNIT(S): at 11:31

## 2022-01-01 RX ADMIN — CARBIDOPA AND LEVODOPA 2 TABLET(S): 25; 100 TABLET ORAL at 22:32

## 2022-01-01 RX ADMIN — Medication 1: at 13:02

## 2022-01-01 RX ADMIN — MIRTAZAPINE 7.5 MILLIGRAM(S): 45 TABLET, ORALLY DISINTEGRATING ORAL at 11:39

## 2022-01-01 RX ADMIN — Medication 125 MILLIGRAM(S): at 12:08

## 2022-01-01 RX ADMIN — Medication 50 MILLIGRAM(S): at 22:06

## 2022-01-01 RX ADMIN — Medication 1 MILLIGRAM(S): at 11:47

## 2022-01-01 RX ADMIN — CINACALCET 60 MILLIGRAM(S): 30 TABLET, FILM COATED ORAL at 11:13

## 2022-01-01 RX ADMIN — PANTOPRAZOLE SODIUM 40 MILLIGRAM(S): 20 TABLET, DELAYED RELEASE ORAL at 06:48

## 2022-01-01 RX ADMIN — Medication 1 MILLIGRAM(S): at 21:52

## 2022-01-01 RX ADMIN — Medication 0.5 MILLIGRAM(S): at 11:30

## 2022-01-01 RX ADMIN — MIRTAZAPINE 7.5 MILLIGRAM(S): 45 TABLET, ORALLY DISINTEGRATING ORAL at 12:09

## 2022-01-01 RX ADMIN — Medication 2 MILLIGRAM(S): at 13:55

## 2022-01-01 RX ADMIN — CARBIDOPA AND LEVODOPA 2 TABLET(S): 25; 100 TABLET ORAL at 22:53

## 2022-01-01 RX ADMIN — CHLORHEXIDINE GLUCONATE 1 APPLICATION(S): 213 SOLUTION TOPICAL at 05:53

## 2022-01-01 RX ADMIN — PANTOPRAZOLE SODIUM 40 MILLIGRAM(S): 20 TABLET, DELAYED RELEASE ORAL at 17:30

## 2022-01-01 RX ADMIN — CINACALCET 60 MILLIGRAM(S): 30 TABLET, FILM COATED ORAL at 11:28

## 2022-01-01 RX ADMIN — Medication 1 TABLET(S): at 12:59

## 2022-01-01 RX ADMIN — Medication 50 MILLIGRAM(S): at 09:41

## 2022-01-01 RX ADMIN — Medication 2 MILLIGRAM(S): at 22:52

## 2022-01-01 RX ADMIN — PANTOPRAZOLE SODIUM 10 MG/HR: 20 TABLET, DELAYED RELEASE ORAL at 04:47

## 2022-01-01 RX ADMIN — ROBINUL 0.4 MILLIGRAM(S): 0.2 INJECTION INTRAMUSCULAR; INTRAVENOUS at 11:39

## 2022-01-01 RX ADMIN — Medication 50 MILLIGRAM(S): at 22:54

## 2022-01-01 RX ADMIN — CHLORHEXIDINE GLUCONATE 1 APPLICATION(S): 213 SOLUTION TOPICAL at 05:50

## 2022-01-01 RX ADMIN — PIPERACILLIN AND TAZOBACTAM 25 GRAM(S): 4; .5 INJECTION, POWDER, LYOPHILIZED, FOR SOLUTION INTRAVENOUS at 05:45

## 2022-01-01 RX ADMIN — MEMANTINE HYDROCHLORIDE 5 MILLIGRAM(S): 10 TABLET ORAL at 22:02

## 2022-01-01 RX ADMIN — Medication 0.5 MILLIGRAM(S): at 23:19

## 2022-01-01 RX ADMIN — MEMANTINE HYDROCHLORIDE 5 MILLIGRAM(S): 10 TABLET ORAL at 21:27

## 2022-01-01 RX ADMIN — CHLORHEXIDINE GLUCONATE 1 APPLICATION(S): 213 SOLUTION TOPICAL at 06:14

## 2022-01-01 RX ADMIN — Medication 2 MILLIGRAM(S): at 00:04

## 2022-01-01 RX ADMIN — DIVALPROEX SODIUM 250 MILLIGRAM(S): 500 TABLET, DELAYED RELEASE ORAL at 13:57

## 2022-01-01 RX ADMIN — CINACALCET 60 MILLIGRAM(S): 30 TABLET, FILM COATED ORAL at 11:18

## 2022-01-01 RX ADMIN — Medication 125 MILLIGRAM(S): at 01:03

## 2022-01-01 RX ADMIN — CHLORHEXIDINE GLUCONATE 1 APPLICATION(S): 213 SOLUTION TOPICAL at 08:33

## 2022-01-01 RX ADMIN — CARBIDOPA AND LEVODOPA 2 TABLET(S): 25; 100 TABLET ORAL at 21:08

## 2022-01-01 RX ADMIN — POLYETHYLENE GLYCOL 3350 17 GRAM(S): 17 POWDER, FOR SOLUTION ORAL at 17:42

## 2022-01-01 RX ADMIN — QUETIAPINE FUMARATE 25 MILLIGRAM(S): 200 TABLET, FILM COATED ORAL at 06:32

## 2022-01-01 RX ADMIN — Medication 2 MILLIGRAM(S): at 05:33

## 2022-01-01 RX ADMIN — CARBIDOPA AND LEVODOPA 2 TABLET(S): 25; 100 TABLET ORAL at 22:07

## 2022-01-01 RX ADMIN — Medication 1 MILLIGRAM(S): at 11:23

## 2022-01-01 RX ADMIN — CARBIDOPA AND LEVODOPA 2.5 TABLET(S): 25; 100 TABLET ORAL at 05:24

## 2022-01-01 RX ADMIN — Medication 1 TABLET(S): at 12:28

## 2022-01-01 RX ADMIN — Medication 0.5 MILLIGRAM(S): at 05:58

## 2022-01-01 RX ADMIN — SODIUM CHLORIDE 20 MILLILITER(S): 9 INJECTION, SOLUTION INTRAVENOUS at 12:15

## 2022-01-01 RX ADMIN — Medication 1 TABLET(S): at 12:43

## 2022-01-01 RX ADMIN — CARBIDOPA AND LEVODOPA 2 TABLET(S): 25; 100 TABLET ORAL at 00:16

## 2022-01-01 RX ADMIN — DIVALPROEX SODIUM 250 MILLIGRAM(S): 500 TABLET, DELAYED RELEASE ORAL at 21:10

## 2022-01-01 RX ADMIN — Medication 50 MILLIGRAM(S): at 21:40

## 2022-01-01 RX ADMIN — MIRTAZAPINE 7.5 MILLIGRAM(S): 45 TABLET, ORALLY DISINTEGRATING ORAL at 13:08

## 2022-01-01 RX ADMIN — Medication 5 MILLIGRAM(S): at 00:53

## 2022-01-01 RX ADMIN — Medication 1 TABLET(S): at 11:23

## 2022-01-01 RX ADMIN — CARBIDOPA AND LEVODOPA 2 TABLET(S): 25; 100 TABLET ORAL at 21:09

## 2022-01-01 RX ADMIN — PANTOPRAZOLE SODIUM 40 MILLIGRAM(S): 20 TABLET, DELAYED RELEASE ORAL at 05:27

## 2022-01-01 RX ADMIN — CARBIDOPA AND LEVODOPA 2 TABLET(S): 25; 100 TABLET ORAL at 22:34

## 2022-01-01 RX ADMIN — Medication 125 MILLIGRAM(S): at 06:01

## 2022-01-01 RX ADMIN — Medication 12.5 MILLILITER(S): at 06:39

## 2022-01-01 RX ADMIN — CARBIDOPA AND LEVODOPA 2.5 TABLET(S): 25; 100 TABLET ORAL at 05:58

## 2022-01-01 RX ADMIN — HEPARIN SODIUM 5000 UNIT(S): 5000 INJECTION INTRAVENOUS; SUBCUTANEOUS at 18:33

## 2022-01-01 RX ADMIN — MIRTAZAPINE 7.5 MILLIGRAM(S): 45 TABLET, ORALLY DISINTEGRATING ORAL at 11:06

## 2022-01-01 RX ADMIN — QUETIAPINE FUMARATE 25 MILLIGRAM(S): 200 TABLET, FILM COATED ORAL at 00:04

## 2022-01-01 RX ADMIN — DIVALPROEX SODIUM 250 MILLIGRAM(S): 500 TABLET, DELAYED RELEASE ORAL at 14:39

## 2022-01-01 RX ADMIN — Medication 1: at 17:49

## 2022-01-01 RX ADMIN — DIVALPROEX SODIUM 250 MILLIGRAM(S): 500 TABLET, DELAYED RELEASE ORAL at 17:38

## 2022-01-01 RX ADMIN — PANTOPRAZOLE SODIUM 40 MILLIGRAM(S): 20 TABLET, DELAYED RELEASE ORAL at 18:24

## 2022-01-01 RX ADMIN — HEPARIN SODIUM 5000 UNIT(S): 5000 INJECTION INTRAVENOUS; SUBCUTANEOUS at 17:48

## 2022-01-01 RX ADMIN — CINACALCET 60 MILLIGRAM(S): 30 TABLET, FILM COATED ORAL at 13:48

## 2022-01-01 RX ADMIN — ROBINUL 0.4 MILLIGRAM(S): 0.2 INJECTION INTRAMUSCULAR; INTRAVENOUS at 05:58

## 2022-01-01 RX ADMIN — POLYETHYLENE GLYCOL 3350 17 GRAM(S): 17 POWDER, FOR SOLUTION ORAL at 18:30

## 2022-01-01 RX ADMIN — TAMSULOSIN HYDROCHLORIDE 0.4 MILLIGRAM(S): 0.4 CAPSULE ORAL at 21:34

## 2022-01-01 RX ADMIN — MIRTAZAPINE 7.5 MILLIGRAM(S): 45 TABLET, ORALLY DISINTEGRATING ORAL at 12:00

## 2022-01-01 RX ADMIN — SENNA PLUS 10 MILLILITER(S): 8.6 TABLET ORAL at 22:00

## 2022-01-01 RX ADMIN — Medication 20 MICROGRAM(S): at 22:26

## 2022-01-01 RX ADMIN — INSULIN GLARGINE 6 UNIT(S): 100 INJECTION, SOLUTION SUBCUTANEOUS at 22:17

## 2022-01-01 RX ADMIN — MIRTAZAPINE 7.5 MILLIGRAM(S): 45 TABLET, ORALLY DISINTEGRATING ORAL at 13:53

## 2022-01-01 RX ADMIN — Medication 2 MILLIGRAM(S): at 14:04

## 2022-01-01 RX ADMIN — Medication 2 MILLIGRAM(S): at 15:42

## 2022-01-01 RX ADMIN — MEMANTINE HYDROCHLORIDE 5 MILLIGRAM(S): 10 TABLET ORAL at 21:09

## 2022-01-01 RX ADMIN — QUETIAPINE FUMARATE 50 MILLIGRAM(S): 200 TABLET, FILM COATED ORAL at 22:54

## 2022-01-01 RX ADMIN — PIPERACILLIN AND TAZOBACTAM 25 GRAM(S): 4; .5 INJECTION, POWDER, LYOPHILIZED, FOR SOLUTION INTRAVENOUS at 05:09

## 2022-01-01 RX ADMIN — MIRTAZAPINE 7.5 MILLIGRAM(S): 45 TABLET, ORALLY DISINTEGRATING ORAL at 11:45

## 2022-01-01 RX ADMIN — Medication 1: at 00:38

## 2022-01-01 RX ADMIN — PANTOPRAZOLE SODIUM 10 MG/HR: 20 TABLET, DELAYED RELEASE ORAL at 18:01

## 2022-01-01 RX ADMIN — Medication 50 MILLIGRAM(S): at 22:00

## 2022-01-01 RX ADMIN — Medication 125 MILLIGRAM(S): at 17:02

## 2022-01-01 RX ADMIN — ATORVASTATIN CALCIUM 80 MILLIGRAM(S): 80 TABLET, FILM COATED ORAL at 21:36

## 2022-01-01 RX ADMIN — QUETIAPINE FUMARATE 25 MILLIGRAM(S): 200 TABLET, FILM COATED ORAL at 22:46

## 2022-01-01 RX ADMIN — MIDODRINE HYDROCHLORIDE 10 MILLIGRAM(S): 2.5 TABLET ORAL at 11:50

## 2022-01-01 RX ADMIN — CARBIDOPA AND LEVODOPA 2.5 TABLET(S): 25; 100 TABLET ORAL at 06:21

## 2022-01-01 RX ADMIN — Medication 2 MILLIGRAM(S): at 05:29

## 2022-01-01 RX ADMIN — CARBIDOPA AND LEVODOPA 2.5 TABLET(S): 25; 100 TABLET ORAL at 16:50

## 2022-01-01 RX ADMIN — POLYETHYLENE GLYCOL 3350 17 GRAM(S): 17 POWDER, FOR SOLUTION ORAL at 05:28

## 2022-01-01 RX ADMIN — CHLORHEXIDINE GLUCONATE 1 APPLICATION(S): 213 SOLUTION TOPICAL at 06:53

## 2022-01-01 RX ADMIN — MUPIROCIN 1 APPLICATION(S): 20 OINTMENT TOPICAL at 05:37

## 2022-01-01 RX ADMIN — CINACALCET 60 MILLIGRAM(S): 30 TABLET, FILM COATED ORAL at 12:28

## 2022-01-01 RX ADMIN — MIDODRINE HYDROCHLORIDE 30 MILLIGRAM(S): 2.5 TABLET ORAL at 09:03

## 2022-01-01 RX ADMIN — Medication 1 MILLIGRAM(S): at 11:40

## 2022-01-01 RX ADMIN — CARBIDOPA AND LEVODOPA 2.5 TABLET(S): 25; 100 TABLET ORAL at 05:39

## 2022-01-01 RX ADMIN — INSULIN GLARGINE 6 UNIT(S): 100 INJECTION, SOLUTION SUBCUTANEOUS at 22:24

## 2022-01-01 RX ADMIN — QUETIAPINE FUMARATE 50 MILLIGRAM(S): 200 TABLET, FILM COATED ORAL at 22:07

## 2022-01-01 RX ADMIN — Medication 50 MILLIGRAM(S): at 12:14

## 2022-01-01 RX ADMIN — CARBIDOPA AND LEVODOPA 2.5 TABLET(S): 25; 100 TABLET ORAL at 06:13

## 2022-01-01 RX ADMIN — PANTOPRAZOLE SODIUM 40 MILLIGRAM(S): 20 TABLET, DELAYED RELEASE ORAL at 17:58

## 2022-01-01 RX ADMIN — ATORVASTATIN CALCIUM 80 MILLIGRAM(S): 80 TABLET, FILM COATED ORAL at 21:50

## 2022-01-01 RX ADMIN — Medication 50 MILLIGRAM(S): at 22:47

## 2022-01-01 RX ADMIN — Medication 1: at 11:37

## 2022-01-01 RX ADMIN — Medication 12.5 MICROGRAM(S): at 05:12

## 2022-01-01 RX ADMIN — Medication 2 MILLIGRAM(S): at 13:03

## 2022-01-01 RX ADMIN — Medication 2 MILLIGRAM(S): at 05:12

## 2022-01-01 RX ADMIN — Medication 62.5 MILLIMOLE(S): at 05:19

## 2022-01-01 RX ADMIN — QUETIAPINE FUMARATE 25 MILLIGRAM(S): 200 TABLET, FILM COATED ORAL at 22:36

## 2022-01-01 RX ADMIN — DULOXETINE HYDROCHLORIDE 20 MILLIGRAM(S): 30 CAPSULE, DELAYED RELEASE ORAL at 12:12

## 2022-01-01 RX ADMIN — Medication 2 MILLIGRAM(S): at 15:22

## 2022-01-01 RX ADMIN — Medication 1: at 13:10

## 2022-01-01 RX ADMIN — PANTOPRAZOLE SODIUM 40 MILLIGRAM(S): 20 TABLET, DELAYED RELEASE ORAL at 05:17

## 2022-01-01 RX ADMIN — Medication 2: at 11:54

## 2022-01-01 RX ADMIN — MIDODRINE HYDROCHLORIDE 15 MILLIGRAM(S): 2.5 TABLET ORAL at 10:27

## 2022-01-01 RX ADMIN — Medication 1 MILLIGRAM(S): at 12:03

## 2022-01-01 RX ADMIN — Medication 100 GRAM(S): at 10:27

## 2022-01-01 RX ADMIN — CARBIDOPA AND LEVODOPA 2.5 TABLET(S): 25; 100 TABLET ORAL at 05:30

## 2022-01-01 RX ADMIN — DIVALPROEX SODIUM 250 MILLIGRAM(S): 500 TABLET, DELAYED RELEASE ORAL at 22:06

## 2022-01-01 RX ADMIN — Medication 1 MILLIGRAM(S): at 23:52

## 2022-01-01 RX ADMIN — Medication 1: at 08:18

## 2022-01-01 RX ADMIN — Medication 1 TABLET(S): at 13:53

## 2022-01-01 RX ADMIN — Medication 2 MILLIGRAM(S): at 13:42

## 2022-01-01 RX ADMIN — CHLORHEXIDINE GLUCONATE 1 APPLICATION(S): 213 SOLUTION TOPICAL at 06:23

## 2022-01-01 RX ADMIN — Medication 50 MILLIGRAM(S): at 22:12

## 2022-01-01 RX ADMIN — PIPERACILLIN AND TAZOBACTAM 25 GRAM(S): 4; .5 INJECTION, POWDER, LYOPHILIZED, FOR SOLUTION INTRAVENOUS at 22:10

## 2022-01-01 RX ADMIN — Medication 50 MICROGRAM(S): at 06:23

## 2022-01-01 RX ADMIN — HYDROMORPHONE HYDROCHLORIDE 1 MILLIGRAM(S): 2 INJECTION INTRAMUSCULAR; INTRAVENOUS; SUBCUTANEOUS at 09:21

## 2022-01-01 RX ADMIN — CARBIDOPA AND LEVODOPA 2 TABLET(S): 25; 100 TABLET ORAL at 21:55

## 2022-01-01 RX ADMIN — QUETIAPINE FUMARATE 12.5 MILLIGRAM(S): 200 TABLET, FILM COATED ORAL at 21:24

## 2022-01-01 RX ADMIN — MIRTAZAPINE 7.5 MILLIGRAM(S): 45 TABLET, ORALLY DISINTEGRATING ORAL at 11:16

## 2022-01-01 RX ADMIN — CINACALCET 60 MILLIGRAM(S): 30 TABLET, FILM COATED ORAL at 11:51

## 2022-01-01 RX ADMIN — CARBIDOPA AND LEVODOPA 2 TABLET(S): 25; 100 TABLET ORAL at 21:40

## 2022-01-01 RX ADMIN — Medication 25 MICROGRAM(S): at 05:32

## 2022-01-01 RX ADMIN — Medication 1 MILLIGRAM(S): at 12:02

## 2022-01-01 RX ADMIN — DIVALPROEX SODIUM 250 MILLIGRAM(S): 500 TABLET, DELAYED RELEASE ORAL at 22:12

## 2022-01-01 RX ADMIN — CINACALCET 60 MILLIGRAM(S): 30 TABLET, FILM COATED ORAL at 12:40

## 2022-01-01 RX ADMIN — PROPOFOL 11.6 MICROGRAM(S)/KG/MIN: 10 INJECTION, EMULSION INTRAVENOUS at 21:51

## 2022-01-01 RX ADMIN — PANTOPRAZOLE SODIUM 40 MILLIGRAM(S): 20 TABLET, DELAYED RELEASE ORAL at 05:21

## 2022-01-01 RX ADMIN — DIVALPROEX SODIUM 250 MILLIGRAM(S): 500 TABLET, DELAYED RELEASE ORAL at 21:59

## 2022-01-01 RX ADMIN — Medication 1: at 16:52

## 2022-01-01 RX ADMIN — MIDODRINE HYDROCHLORIDE 20 MILLIGRAM(S): 2.5 TABLET ORAL at 17:03

## 2022-01-01 RX ADMIN — Medication 2: at 17:25

## 2022-01-01 RX ADMIN — QUETIAPINE FUMARATE 50 MILLIGRAM(S): 200 TABLET, FILM COATED ORAL at 21:41

## 2022-01-01 RX ADMIN — MUPIROCIN 1 APPLICATION(S): 20 OINTMENT TOPICAL at 06:37

## 2022-01-01 RX ADMIN — Medication 25 MILLIGRAM(S): at 18:23

## 2022-01-01 RX ADMIN — MIDODRINE HYDROCHLORIDE 10 MILLIGRAM(S): 2.5 TABLET ORAL at 11:48

## 2022-01-01 RX ADMIN — Medication 2 MILLIGRAM(S): at 00:16

## 2022-01-01 RX ADMIN — Medication 2 MILLIGRAM(S): at 06:26

## 2022-01-01 RX ADMIN — POLYETHYLENE GLYCOL 3350 17 GRAM(S): 17 POWDER, FOR SOLUTION ORAL at 06:26

## 2022-01-01 RX ADMIN — DIVALPROEX SODIUM 250 MILLIGRAM(S): 500 TABLET, DELAYED RELEASE ORAL at 14:02

## 2022-01-01 RX ADMIN — DIVALPROEX SODIUM 250 MILLIGRAM(S): 500 TABLET, DELAYED RELEASE ORAL at 13:54

## 2022-01-01 RX ADMIN — CINACALCET 60 MILLIGRAM(S): 30 TABLET, FILM COATED ORAL at 11:25

## 2022-01-01 RX ADMIN — Medication 50 MICROGRAM(S): at 06:26

## 2022-01-01 RX ADMIN — MIDODRINE HYDROCHLORIDE 10 MILLIGRAM(S): 2.5 TABLET ORAL at 11:24

## 2022-01-01 RX ADMIN — Medication 14.5 MICROGRAM(S)/KG/MIN: at 04:18

## 2022-01-01 RX ADMIN — Medication 1 MILLIGRAM(S): at 11:57

## 2022-01-01 RX ADMIN — Medication 125 MILLIGRAM(S): at 18:42

## 2022-01-01 RX ADMIN — Medication 1 TABLET(S): at 13:07

## 2022-01-01 RX ADMIN — PIPERACILLIN AND TAZOBACTAM 25 GRAM(S): 4; .5 INJECTION, POWDER, LYOPHILIZED, FOR SOLUTION INTRAVENOUS at 05:59

## 2022-01-01 RX ADMIN — MIRTAZAPINE 7.5 MILLIGRAM(S): 45 TABLET, ORALLY DISINTEGRATING ORAL at 12:18

## 2022-01-01 RX ADMIN — Medication 2 MILLIGRAM(S): at 05:25

## 2022-01-01 RX ADMIN — HEPARIN SODIUM 5000 UNIT(S): 5000 INJECTION INTRAVENOUS; SUBCUTANEOUS at 17:24

## 2022-01-01 RX ADMIN — DULOXETINE HYDROCHLORIDE 20 MILLIGRAM(S): 30 CAPSULE, DELAYED RELEASE ORAL at 11:13

## 2022-01-01 RX ADMIN — HEPARIN SODIUM 5000 UNIT(S): 5000 INJECTION INTRAVENOUS; SUBCUTANEOUS at 06:43

## 2022-01-01 RX ADMIN — Medication 1 MILLIGRAM(S): at 11:54

## 2022-01-01 RX ADMIN — CARBIDOPA AND LEVODOPA 2.5 TABLET(S): 25; 100 TABLET ORAL at 05:14

## 2022-01-01 RX ADMIN — POLYETHYLENE GLYCOL 3350 17 GRAM(S): 17 POWDER, FOR SOLUTION ORAL at 06:22

## 2022-01-01 RX ADMIN — MIRTAZAPINE 7.5 MILLIGRAM(S): 45 TABLET, ORALLY DISINTEGRATING ORAL at 16:53

## 2022-01-01 RX ADMIN — Medication 2 MILLIGRAM(S): at 22:19

## 2022-01-01 RX ADMIN — Medication 1 TABLET(S): at 13:13

## 2022-01-01 RX ADMIN — CARBIDOPA AND LEVODOPA 2.5 TABLET(S): 25; 100 TABLET ORAL at 18:23

## 2022-01-01 RX ADMIN — ERYTHROPOIETIN 4000 UNIT(S): 10000 INJECTION, SOLUTION INTRAVENOUS; SUBCUTANEOUS at 18:48

## 2022-01-01 RX ADMIN — HEPARIN SODIUM 5000 UNIT(S): 5000 INJECTION INTRAVENOUS; SUBCUTANEOUS at 17:43

## 2022-01-01 RX ADMIN — Medication 50 MILLIGRAM(S): at 21:09

## 2022-01-01 RX ADMIN — Medication 1000 UNIT(S): at 12:01

## 2022-01-01 RX ADMIN — DULOXETINE HYDROCHLORIDE 20 MILLIGRAM(S): 30 CAPSULE, DELAYED RELEASE ORAL at 12:44

## 2022-01-01 RX ADMIN — QUETIAPINE FUMARATE 25 MILLIGRAM(S): 200 TABLET, FILM COATED ORAL at 14:24

## 2022-01-01 RX ADMIN — CARBIDOPA AND LEVODOPA 2.5 TABLET(S): 25; 100 TABLET ORAL at 13:55

## 2022-01-01 RX ADMIN — CARBIDOPA AND LEVODOPA 2.5 TABLET(S): 25; 100 TABLET ORAL at 06:22

## 2022-01-01 RX ADMIN — Medication 125 MILLIGRAM(S): at 00:17

## 2022-01-01 RX ADMIN — DULOXETINE HYDROCHLORIDE 20 MILLIGRAM(S): 30 CAPSULE, DELAYED RELEASE ORAL at 12:57

## 2022-01-01 RX ADMIN — DULOXETINE HYDROCHLORIDE 20 MILLIGRAM(S): 30 CAPSULE, DELAYED RELEASE ORAL at 13:14

## 2022-01-01 RX ADMIN — MIDODRINE HYDROCHLORIDE 20 MILLIGRAM(S): 2.5 TABLET ORAL at 02:00

## 2022-01-01 RX ADMIN — MIDODRINE HYDROCHLORIDE 10 MILLIGRAM(S): 2.5 TABLET ORAL at 11:13

## 2022-01-01 RX ADMIN — CINACALCET 60 MILLIGRAM(S): 30 TABLET, FILM COATED ORAL at 11:16

## 2022-01-01 RX ADMIN — Medication 125 MILLIGRAM(S): at 12:26

## 2022-01-01 RX ADMIN — DULOXETINE HYDROCHLORIDE 20 MILLIGRAM(S): 30 CAPSULE, DELAYED RELEASE ORAL at 11:21

## 2022-01-01 RX ADMIN — ATORVASTATIN CALCIUM 80 MILLIGRAM(S): 80 TABLET, FILM COATED ORAL at 21:26

## 2022-01-01 RX ADMIN — Medication 1 TABLET(S): at 12:12

## 2022-01-01 RX ADMIN — CARBIDOPA AND LEVODOPA 2.5 TABLET(S): 25; 100 TABLET ORAL at 17:37

## 2022-01-01 RX ADMIN — QUETIAPINE FUMARATE 50 MILLIGRAM(S): 200 TABLET, FILM COATED ORAL at 21:55

## 2022-01-01 RX ADMIN — Medication 25 MICROGRAM(S): at 22:21

## 2022-01-01 RX ADMIN — Medication 1 TABLET(S): at 13:11

## 2022-01-01 RX ADMIN — DIVALPROEX SODIUM 250 MILLIGRAM(S): 500 TABLET, DELAYED RELEASE ORAL at 21:54

## 2022-01-01 RX ADMIN — CINACALCET 60 MILLIGRAM(S): 30 TABLET, FILM COATED ORAL at 12:01

## 2022-01-01 RX ADMIN — Medication 50 MILLIGRAM(S): at 21:32

## 2022-01-01 RX ADMIN — Medication 25 MICROGRAM(S): at 06:28

## 2022-01-01 RX ADMIN — Medication 5 MILLIGRAM(S): at 05:12

## 2022-01-01 RX ADMIN — Medication 125 MILLIGRAM(S): at 18:19

## 2022-01-01 RX ADMIN — Medication 10 MILLIGRAM(S): at 11:20

## 2022-01-01 RX ADMIN — CINACALCET 60 MILLIGRAM(S): 30 TABLET, FILM COATED ORAL at 13:08

## 2022-01-01 RX ADMIN — CARBIDOPA AND LEVODOPA 2 TABLET(S): 25; 100 TABLET ORAL at 22:45

## 2022-01-01 RX ADMIN — CINACALCET 60 MILLIGRAM(S): 30 TABLET, FILM COATED ORAL at 12:34

## 2022-01-01 RX ADMIN — PANTOPRAZOLE SODIUM 10 MG/HR: 20 TABLET, DELAYED RELEASE ORAL at 21:12

## 2022-01-01 RX ADMIN — DIVALPROEX SODIUM 250 MILLIGRAM(S): 500 TABLET, DELAYED RELEASE ORAL at 13:13

## 2022-01-01 RX ADMIN — Medication 5 MILLIGRAM(S): at 05:39

## 2022-01-01 RX ADMIN — PANTOPRAZOLE SODIUM 10 MG/HR: 20 TABLET, DELAYED RELEASE ORAL at 12:15

## 2022-01-01 RX ADMIN — CINACALCET 60 MILLIGRAM(S): 30 TABLET, FILM COATED ORAL at 11:47

## 2022-01-01 RX ADMIN — Medication 25 MICROGRAM(S): at 06:05

## 2022-01-01 RX ADMIN — PANTOPRAZOLE SODIUM 40 MILLIGRAM(S): 20 TABLET, DELAYED RELEASE ORAL at 17:07

## 2022-01-01 RX ADMIN — POLYETHYLENE GLYCOL 3350 17 GRAM(S): 17 POWDER, FOR SOLUTION ORAL at 19:01

## 2022-01-01 RX ADMIN — TAMSULOSIN HYDROCHLORIDE 0.4 MILLIGRAM(S): 0.4 CAPSULE ORAL at 22:38

## 2022-01-01 RX ADMIN — DULOXETINE HYDROCHLORIDE 20 MILLIGRAM(S): 30 CAPSULE, DELAYED RELEASE ORAL at 11:05

## 2022-01-01 RX ADMIN — Medication 2 MILLIGRAM(S): at 21:53

## 2022-01-01 RX ADMIN — CHLORHEXIDINE GLUCONATE 1 APPLICATION(S): 213 SOLUTION TOPICAL at 05:29

## 2022-01-01 RX ADMIN — CARBIDOPA AND LEVODOPA 2 TABLET(S): 25; 100 TABLET ORAL at 23:51

## 2022-01-01 RX ADMIN — Medication 50 MILLIGRAM(S): at 11:01

## 2022-01-01 RX ADMIN — Medication 50 MILLIGRAM(S): at 11:18

## 2022-01-01 RX ADMIN — Medication 30 MICROGRAM(S): at 21:06

## 2022-01-01 RX ADMIN — DIVALPROEX SODIUM 250 MILLIGRAM(S): 500 TABLET, DELAYED RELEASE ORAL at 22:53

## 2022-01-01 RX ADMIN — Medication 12.5 MICROGRAM(S): at 06:14

## 2022-01-01 RX ADMIN — Medication 1 MILLIGRAM(S): at 12:01

## 2022-01-01 RX ADMIN — Medication 12.5 MICROGRAM(S): at 06:15

## 2022-01-01 RX ADMIN — PIPERACILLIN AND TAZOBACTAM 25 GRAM(S): 4; .5 INJECTION, POWDER, LYOPHILIZED, FOR SOLUTION INTRAVENOUS at 21:43

## 2022-01-01 RX ADMIN — QUETIAPINE FUMARATE 50 MILLIGRAM(S): 200 TABLET, FILM COATED ORAL at 23:07

## 2022-01-01 RX ADMIN — Medication 2 MILLIGRAM(S): at 05:35

## 2022-01-01 RX ADMIN — DIVALPROEX SODIUM 250 MILLIGRAM(S): 500 TABLET, DELAYED RELEASE ORAL at 22:36

## 2022-01-01 RX ADMIN — Medication 14.5 MICROGRAM(S)/KG/MIN: at 21:52

## 2022-01-01 RX ADMIN — Medication 50 MILLIGRAM(S): at 22:22

## 2022-01-01 RX ADMIN — Medication 0.5 MILLIGRAM(S): at 06:39

## 2022-01-01 RX ADMIN — Medication 1000 UNIT(S): at 11:25

## 2022-01-01 RX ADMIN — MIRTAZAPINE 7.5 MILLIGRAM(S): 45 TABLET, ORALLY DISINTEGRATING ORAL at 12:45

## 2022-01-01 RX ADMIN — Medication 50 MICROGRAM(S): at 05:41

## 2022-01-01 RX ADMIN — CINACALCET 60 MILLIGRAM(S): 30 TABLET, FILM COATED ORAL at 12:29

## 2022-01-01 RX ADMIN — Medication 50 MILLIGRAM(S): at 13:13

## 2022-01-01 RX ADMIN — Medication 2 MILLIGRAM(S): at 06:15

## 2022-01-01 RX ADMIN — Medication 0.5 MILLIGRAM(S): at 17:25

## 2022-01-01 RX ADMIN — DIVALPROEX SODIUM 250 MILLIGRAM(S): 500 TABLET, DELAYED RELEASE ORAL at 05:16

## 2022-01-01 RX ADMIN — Medication 2 MILLIGRAM(S): at 13:06

## 2022-01-01 RX ADMIN — CARBIDOPA AND LEVODOPA 2.5 TABLET(S): 25; 100 TABLET ORAL at 15:23

## 2022-01-01 RX ADMIN — Medication 125 MILLIGRAM(S): at 17:06

## 2022-01-01 RX ADMIN — CARBIDOPA AND LEVODOPA 2 TABLET(S): 25; 100 TABLET ORAL at 22:28

## 2022-01-01 RX ADMIN — MIRTAZAPINE 7.5 MILLIGRAM(S): 45 TABLET, ORALLY DISINTEGRATING ORAL at 12:04

## 2022-01-01 RX ADMIN — MIDODRINE HYDROCHLORIDE 15 MILLIGRAM(S): 2.5 TABLET ORAL at 18:37

## 2022-01-01 RX ADMIN — Medication 25 MICROGRAM(S): at 06:14

## 2022-01-01 RX ADMIN — Medication 25 MICROGRAM(S): at 22:12

## 2022-01-01 RX ADMIN — INSULIN GLARGINE 6 UNIT(S): 100 INJECTION, SOLUTION SUBCUTANEOUS at 21:50

## 2022-01-01 RX ADMIN — DIVALPROEX SODIUM 250 MILLIGRAM(S): 500 TABLET, DELAYED RELEASE ORAL at 22:32

## 2022-01-01 RX ADMIN — POLYETHYLENE GLYCOL 3350 17 GRAM(S): 17 POWDER, FOR SOLUTION ORAL at 07:26

## 2022-01-01 RX ADMIN — QUETIAPINE FUMARATE 25 MILLIGRAM(S): 200 TABLET, FILM COATED ORAL at 06:09

## 2022-01-01 RX ADMIN — MIRTAZAPINE 7.5 MILLIGRAM(S): 45 TABLET, ORALLY DISINTEGRATING ORAL at 14:03

## 2022-01-01 RX ADMIN — Medication 1 MILLIGRAM(S): at 21:04

## 2022-01-01 RX ADMIN — Medication 5 MILLIGRAM(S): at 12:16

## 2022-01-01 RX ADMIN — MIDODRINE HYDROCHLORIDE 10 MILLIGRAM(S): 2.5 TABLET ORAL at 08:04

## 2022-01-01 RX ADMIN — DULOXETINE HYDROCHLORIDE 20 MILLIGRAM(S): 30 CAPSULE, DELAYED RELEASE ORAL at 12:58

## 2022-01-01 RX ADMIN — CINACALCET 60 MILLIGRAM(S): 30 TABLET, FILM COATED ORAL at 12:45

## 2022-01-01 RX ADMIN — Medication 25 MICROGRAM(S): at 22:29

## 2022-01-01 RX ADMIN — QUETIAPINE FUMARATE 25 MILLIGRAM(S): 200 TABLET, FILM COATED ORAL at 21:41

## 2022-01-01 RX ADMIN — Medication 1000 UNIT(S): at 12:23

## 2022-01-01 RX ADMIN — ERYTHROPOIETIN 10000 UNIT(S): 10000 INJECTION, SOLUTION INTRAVENOUS; SUBCUTANEOUS at 15:53

## 2022-01-01 RX ADMIN — Medication 650 MILLIGRAM(S): at 11:45

## 2022-01-01 RX ADMIN — CARBIDOPA AND LEVODOPA 2.5 TABLET(S): 25; 100 TABLET ORAL at 13:03

## 2022-01-01 RX ADMIN — Medication 1 MILLIGRAM(S): at 12:34

## 2022-01-01 RX ADMIN — QUETIAPINE FUMARATE 50 MILLIGRAM(S): 200 TABLET, FILM COATED ORAL at 22:06

## 2022-01-01 RX ADMIN — Medication 25 MICROGRAM(S): at 06:46

## 2022-01-01 RX ADMIN — Medication 2: at 11:55

## 2022-01-01 RX ADMIN — HEPARIN SODIUM 5000 UNIT(S): 5000 INJECTION INTRAVENOUS; SUBCUTANEOUS at 05:15

## 2022-01-01 RX ADMIN — SODIUM CHLORIDE 2000 MILLILITER(S): 9 INJECTION, SOLUTION INTRAVENOUS at 02:00

## 2022-01-01 RX ADMIN — Medication 40 MILLIEQUIVALENT(S): at 19:00

## 2022-01-01 RX ADMIN — PANTOPRAZOLE SODIUM 40 MILLIGRAM(S): 20 TABLET, DELAYED RELEASE ORAL at 05:18

## 2022-01-01 RX ADMIN — Medication 1: at 01:37

## 2022-01-01 RX ADMIN — POLYETHYLENE GLYCOL 3350 17 GRAM(S): 17 POWDER, FOR SOLUTION ORAL at 06:31

## 2022-01-01 RX ADMIN — Medication 2 MILLIGRAM(S): at 13:48

## 2022-01-01 RX ADMIN — Medication 2 MILLIGRAM(S): at 13:44

## 2022-01-01 RX ADMIN — Medication 1 TABLET(S): at 12:01

## 2022-01-01 RX ADMIN — HEPARIN SODIUM 5000 UNIT(S): 5000 INJECTION INTRAVENOUS; SUBCUTANEOUS at 17:56

## 2022-01-01 RX ADMIN — Medication 50 MICROGRAM(S): at 05:30

## 2022-01-01 RX ADMIN — DULOXETINE HYDROCHLORIDE 20 MILLIGRAM(S): 30 CAPSULE, DELAYED RELEASE ORAL at 12:45

## 2022-01-01 RX ADMIN — Medication 2 MILLIGRAM(S): at 12:45

## 2022-01-01 RX ADMIN — MIRTAZAPINE 7.5 MILLIGRAM(S): 45 TABLET, ORALLY DISINTEGRATING ORAL at 12:28

## 2022-01-01 RX ADMIN — Medication 2: at 12:16

## 2022-01-01 RX ADMIN — Medication 1: at 00:53

## 2022-01-01 RX ADMIN — Medication 1 TABLET(S): at 11:33

## 2022-01-01 RX ADMIN — ATORVASTATIN CALCIUM 80 MILLIGRAM(S): 80 TABLET, FILM COATED ORAL at 22:48

## 2022-01-01 RX ADMIN — DEXMEDETOMIDINE HYDROCHLORIDE IN 0.9% SODIUM CHLORIDE 9.64 MICROGRAM(S)/KG/HR: 4 INJECTION INTRAVENOUS at 11:55

## 2022-01-01 RX ADMIN — MEMANTINE HYDROCHLORIDE 5 MILLIGRAM(S): 10 TABLET ORAL at 22:17

## 2022-01-01 RX ADMIN — HEPARIN SODIUM 5000 UNIT(S): 5000 INJECTION INTRAVENOUS; SUBCUTANEOUS at 17:37

## 2022-01-01 RX ADMIN — DEXMEDETOMIDINE HYDROCHLORIDE IN 0.9% SODIUM CHLORIDE 9.64 MICROGRAM(S)/KG/HR: 4 INJECTION INTRAVENOUS at 20:07

## 2022-01-01 RX ADMIN — Medication 25 MICROGRAM(S): at 06:43

## 2022-01-01 RX ADMIN — DIVALPROEX SODIUM 250 MILLIGRAM(S): 500 TABLET, DELAYED RELEASE ORAL at 15:43

## 2022-01-01 RX ADMIN — Medication 1000 UNIT(S): at 13:07

## 2022-01-01 RX ADMIN — Medication 1: at 17:58

## 2022-01-01 RX ADMIN — Medication 1 MILLIGRAM(S): at 12:04

## 2022-01-01 RX ADMIN — Medication 125 MILLIGRAM(S): at 13:04

## 2022-01-01 RX ADMIN — Medication 12.5 MICROGRAM(S): at 06:23

## 2022-01-01 RX ADMIN — CINACALCET 60 MILLIGRAM(S): 30 TABLET, FILM COATED ORAL at 12:12

## 2022-01-01 RX ADMIN — Medication 2 MILLIGRAM(S): at 18:12

## 2022-01-01 RX ADMIN — HEPARIN SODIUM 5000 UNIT(S): 5000 INJECTION INTRAVENOUS; SUBCUTANEOUS at 05:40

## 2022-01-01 RX ADMIN — DIVALPROEX SODIUM 250 MILLIGRAM(S): 500 TABLET, DELAYED RELEASE ORAL at 06:09

## 2022-01-01 RX ADMIN — Medication 5 MILLIGRAM(S): at 05:33

## 2022-01-01 RX ADMIN — Medication 50 MILLIGRAM(S): at 21:34

## 2022-01-01 RX ADMIN — CARBIDOPA AND LEVODOPA 2.5 TABLET(S): 25; 100 TABLET ORAL at 05:18

## 2022-01-01 RX ADMIN — POLYETHYLENE GLYCOL 3350 17 GRAM(S): 17 POWDER, FOR SOLUTION ORAL at 18:37

## 2022-01-01 RX ADMIN — POLYETHYLENE GLYCOL 3350 17 GRAM(S): 17 POWDER, FOR SOLUTION ORAL at 17:32

## 2022-01-01 RX ADMIN — Medication 1: at 06:29

## 2022-01-01 RX ADMIN — DIVALPROEX SODIUM 250 MILLIGRAM(S): 500 TABLET, DELAYED RELEASE ORAL at 13:22

## 2022-01-01 RX ADMIN — CARBIDOPA AND LEVODOPA 2.5 TABLET(S): 25; 100 TABLET ORAL at 05:25

## 2022-01-01 RX ADMIN — Medication 14.5 MICROGRAM(S)/KG/MIN: at 20:07

## 2022-01-01 RX ADMIN — Medication 1 TABLET(S): at 12:33

## 2022-01-01 RX ADMIN — HEPARIN SODIUM 5000 UNIT(S): 5000 INJECTION INTRAVENOUS; SUBCUTANEOUS at 17:32

## 2022-01-01 RX ADMIN — CARBIDOPA AND LEVODOPA 2.5 TABLET(S): 25; 100 TABLET ORAL at 13:18

## 2022-01-01 RX ADMIN — DIVALPROEX SODIUM 250 MILLIGRAM(S): 500 TABLET, DELAYED RELEASE ORAL at 14:26

## 2022-01-01 RX ADMIN — Medication 80 MILLIGRAM(S): at 12:37

## 2022-01-01 RX ADMIN — INSULIN GLARGINE 6 UNIT(S): 100 INJECTION, SOLUTION SUBCUTANEOUS at 23:10

## 2022-01-01 RX ADMIN — MEMANTINE HYDROCHLORIDE 5 MILLIGRAM(S): 10 TABLET ORAL at 00:40

## 2022-01-01 RX ADMIN — CARBIDOPA AND LEVODOPA 2.5 TABLET(S): 25; 100 TABLET ORAL at 13:07

## 2022-01-01 RX ADMIN — CHLORHEXIDINE GLUCONATE 15 MILLILITER(S): 213 SOLUTION TOPICAL at 17:09

## 2022-01-01 RX ADMIN — Medication 2 MILLIGRAM(S): at 13:31

## 2022-01-01 RX ADMIN — Medication 2 MILLIGRAM(S): at 22:05

## 2022-01-01 RX ADMIN — MEMANTINE HYDROCHLORIDE 5 MILLIGRAM(S): 10 TABLET ORAL at 22:00

## 2022-01-01 RX ADMIN — INSULIN GLARGINE 6 UNIT(S): 100 INJECTION, SOLUTION SUBCUTANEOUS at 22:53

## 2022-01-01 RX ADMIN — Medication 25 MICROGRAM(S): at 05:16

## 2022-01-01 RX ADMIN — MEMANTINE HYDROCHLORIDE 5 MILLIGRAM(S): 10 TABLET ORAL at 21:26

## 2022-01-01 RX ADMIN — Medication 25 MICROGRAM(S): at 05:34

## 2022-01-01 RX ADMIN — Medication 2 MILLIGRAM(S): at 21:45

## 2022-01-01 RX ADMIN — MIRTAZAPINE 7.5 MILLIGRAM(S): 45 TABLET, ORALLY DISINTEGRATING ORAL at 13:50

## 2022-01-01 RX ADMIN — Medication 40 MILLIEQUIVALENT(S): at 07:32

## 2022-01-01 RX ADMIN — DIVALPROEX SODIUM 250 MILLIGRAM(S): 500 TABLET, DELAYED RELEASE ORAL at 07:28

## 2022-01-01 RX ADMIN — CARBIDOPA AND LEVODOPA 2.5 TABLET(S): 25; 100 TABLET ORAL at 05:16

## 2022-01-01 RX ADMIN — Medication 0: at 13:11

## 2022-01-01 RX ADMIN — MIRTAZAPINE 7.5 MILLIGRAM(S): 45 TABLET, ORALLY DISINTEGRATING ORAL at 12:34

## 2022-01-01 RX ADMIN — MIRTAZAPINE 7.5 MILLIGRAM(S): 45 TABLET, ORALLY DISINTEGRATING ORAL at 12:40

## 2022-01-01 RX ADMIN — POLYETHYLENE GLYCOL 3350 17 GRAM(S): 17 POWDER, FOR SOLUTION ORAL at 17:30

## 2022-01-01 RX ADMIN — CHLORHEXIDINE GLUCONATE 1 APPLICATION(S): 213 SOLUTION TOPICAL at 07:22

## 2022-01-01 RX ADMIN — MEMANTINE HYDROCHLORIDE 5 MILLIGRAM(S): 10 TABLET ORAL at 21:53

## 2022-01-01 RX ADMIN — Medication 125 MILLIGRAM(S): at 06:53

## 2022-01-01 RX ADMIN — Medication 2 MILLIGRAM(S): at 23:25

## 2022-01-01 RX ADMIN — CARBIDOPA AND LEVODOPA 2 TABLET(S): 25; 100 TABLET ORAL at 22:47

## 2022-01-01 RX ADMIN — Medication 125 MILLIGRAM(S): at 05:47

## 2022-01-01 RX ADMIN — MEMANTINE HYDROCHLORIDE 5 MILLIGRAM(S): 10 TABLET ORAL at 22:26

## 2022-01-01 RX ADMIN — Medication 50 MILLIGRAM(S): at 21:45

## 2022-01-01 RX ADMIN — Medication 2 MILLIGRAM(S): at 15:31

## 2022-01-01 RX ADMIN — Medication 2 MILLIGRAM(S): at 14:28

## 2022-01-01 RX ADMIN — Medication 2 MILLIGRAM(S): at 06:14

## 2022-01-01 RX ADMIN — Medication 125 MILLIGRAM(S): at 12:16

## 2022-01-01 RX ADMIN — Medication 1 TABLET(S): at 11:16

## 2022-01-01 RX ADMIN — Medication 2: at 06:15

## 2022-01-01 RX ADMIN — DIVALPROEX SODIUM 250 MILLIGRAM(S): 500 TABLET, DELAYED RELEASE ORAL at 21:23

## 2022-01-01 RX ADMIN — MEMANTINE HYDROCHLORIDE 5 MILLIGRAM(S): 10 TABLET ORAL at 22:07

## 2022-01-01 RX ADMIN — MEMANTINE HYDROCHLORIDE 5 MILLIGRAM(S): 10 TABLET ORAL at 22:13

## 2022-01-01 RX ADMIN — MEMANTINE HYDROCHLORIDE 5 MILLIGRAM(S): 10 TABLET ORAL at 22:18

## 2022-01-01 RX ADMIN — CINACALCET 60 MILLIGRAM(S): 30 TABLET, FILM COATED ORAL at 13:52

## 2022-01-01 RX ADMIN — Medication 50 MILLIGRAM(S): at 12:24

## 2022-01-01 RX ADMIN — Medication 1: at 00:48

## 2022-01-01 RX ADMIN — Medication 2 MILLIGRAM(S): at 22:11

## 2022-01-01 RX ADMIN — Medication 2 MILLIGRAM(S): at 06:33

## 2022-01-01 RX ADMIN — CARBIDOPA AND LEVODOPA 2.5 TABLET(S): 25; 100 TABLET ORAL at 06:08

## 2022-01-01 RX ADMIN — Medication 1 TABLET(S): at 18:22

## 2022-01-01 RX ADMIN — Medication 50 MILLILITER(S): at 17:04

## 2022-01-01 RX ADMIN — Medication 2 MILLIGRAM(S): at 16:50

## 2022-01-01 RX ADMIN — CARBIDOPA AND LEVODOPA 2.5 TABLET(S): 25; 100 TABLET ORAL at 05:48

## 2022-01-01 RX ADMIN — PANTOPRAZOLE SODIUM 40 MILLIGRAM(S): 20 TABLET, DELAYED RELEASE ORAL at 17:57

## 2022-01-01 RX ADMIN — PANTOPRAZOLE SODIUM 40 MILLIGRAM(S): 20 TABLET, DELAYED RELEASE ORAL at 17:25

## 2022-01-01 RX ADMIN — QUETIAPINE FUMARATE 25 MILLIGRAM(S): 200 TABLET, FILM COATED ORAL at 22:54

## 2022-01-01 RX ADMIN — CARBIDOPA AND LEVODOPA 2 TABLET(S): 25; 100 TABLET ORAL at 21:43

## 2022-01-01 RX ADMIN — PIPERACILLIN AND TAZOBACTAM 25 GRAM(S): 4; .5 INJECTION, POWDER, LYOPHILIZED, FOR SOLUTION INTRAVENOUS at 12:09

## 2022-01-01 RX ADMIN — CARBIDOPA AND LEVODOPA 2.5 TABLET(S): 25; 100 TABLET ORAL at 14:23

## 2022-01-01 RX ADMIN — DULOXETINE HYDROCHLORIDE 20 MILLIGRAM(S): 30 CAPSULE, DELAYED RELEASE ORAL at 13:09

## 2022-01-01 RX ADMIN — CHLORHEXIDINE GLUCONATE 15 MILLILITER(S): 213 SOLUTION TOPICAL at 05:51

## 2022-01-01 RX ADMIN — PANTOPRAZOLE SODIUM 40 MILLIGRAM(S): 20 TABLET, DELAYED RELEASE ORAL at 05:09

## 2022-01-01 RX ADMIN — Medication 1: at 17:50

## 2022-01-01 RX ADMIN — SODIUM CHLORIDE 20 MILLILITER(S): 9 INJECTION, SOLUTION INTRAVENOUS at 21:52

## 2022-01-01 RX ADMIN — PANTOPRAZOLE SODIUM 40 MILLIGRAM(S): 20 TABLET, DELAYED RELEASE ORAL at 05:15

## 2022-01-01 RX ADMIN — POLYETHYLENE GLYCOL 3350 17 GRAM(S): 17 POWDER, FOR SOLUTION ORAL at 17:53

## 2022-01-01 RX ADMIN — Medication 2 MILLIGRAM(S): at 05:00

## 2022-01-01 RX ADMIN — MUPIROCIN 1 APPLICATION(S): 20 OINTMENT TOPICAL at 05:17

## 2022-01-01 RX ADMIN — Medication 30 MICROGRAM(S): at 21:52

## 2022-01-01 RX ADMIN — DIVALPROEX SODIUM 250 MILLIGRAM(S): 500 TABLET, DELAYED RELEASE ORAL at 14:16

## 2022-01-01 RX ADMIN — Medication 25 MICROGRAM(S): at 05:37

## 2022-01-01 RX ADMIN — DIVALPROEX SODIUM 250 MILLIGRAM(S): 500 TABLET, DELAYED RELEASE ORAL at 00:37

## 2022-01-01 RX ADMIN — Medication 25 MICROGRAM(S): at 05:15

## 2022-01-01 RX ADMIN — HEPARIN SODIUM 5000 UNIT(S): 5000 INJECTION INTRAVENOUS; SUBCUTANEOUS at 16:54

## 2022-01-01 RX ADMIN — CARBIDOPA AND LEVODOPA 2.5 TABLET(S): 25; 100 TABLET ORAL at 05:36

## 2022-01-01 RX ADMIN — Medication 1000 UNIT(S): at 11:36

## 2022-01-01 RX ADMIN — ATORVASTATIN CALCIUM 80 MILLIGRAM(S): 80 TABLET, FILM COATED ORAL at 22:07

## 2022-01-01 RX ADMIN — Medication 1: at 05:37

## 2022-01-01 RX ADMIN — Medication 2 MILLIGRAM(S): at 22:32

## 2022-01-01 RX ADMIN — ROBINUL 0.4 MILLIGRAM(S): 0.2 INJECTION INTRAMUSCULAR; INTRAVENOUS at 17:23

## 2022-01-01 RX ADMIN — Medication 12.5 MICROGRAM(S): at 05:43

## 2022-01-01 RX ADMIN — PIPERACILLIN AND TAZOBACTAM 25 GRAM(S): 4; .5 INJECTION, POWDER, LYOPHILIZED, FOR SOLUTION INTRAVENOUS at 22:20

## 2022-01-01 RX ADMIN — ATORVASTATIN CALCIUM 80 MILLIGRAM(S): 80 TABLET, FILM COATED ORAL at 22:17

## 2022-01-01 RX ADMIN — DIVALPROEX SODIUM 250 MILLIGRAM(S): 500 TABLET, DELAYED RELEASE ORAL at 05:13

## 2022-01-01 RX ADMIN — Medication 5 MILLIGRAM(S): at 00:02

## 2022-01-01 RX ADMIN — Medication 2 MILLIGRAM(S): at 13:01

## 2022-01-01 RX ADMIN — Medication 125 MILLIGRAM(S): at 11:32

## 2022-01-01 RX ADMIN — Medication 1 MILLIGRAM(S): at 11:16

## 2022-01-01 RX ADMIN — Medication 2 MILLIGRAM(S): at 06:05

## 2022-01-01 RX ADMIN — Medication 1 MILLIGRAM(S): at 12:13

## 2022-01-01 RX ADMIN — MIRTAZAPINE 7.5 MILLIGRAM(S): 45 TABLET, ORALLY DISINTEGRATING ORAL at 14:19

## 2022-01-01 RX ADMIN — Medication 50 MILLIGRAM(S): at 21:58

## 2022-01-01 RX ADMIN — Medication 1000 UNIT(S): at 11:28

## 2022-01-01 RX ADMIN — PIPERACILLIN AND TAZOBACTAM 25 GRAM(S): 4; .5 INJECTION, POWDER, LYOPHILIZED, FOR SOLUTION INTRAVENOUS at 21:51

## 2022-01-01 RX ADMIN — Medication 30 MILLILITER(S): at 18:07

## 2022-01-01 RX ADMIN — DULOXETINE HYDROCHLORIDE 20 MILLIGRAM(S): 30 CAPSULE, DELAYED RELEASE ORAL at 11:33

## 2022-01-01 RX ADMIN — PANTOPRAZOLE SODIUM 40 MILLIGRAM(S): 20 TABLET, DELAYED RELEASE ORAL at 17:02

## 2022-01-01 RX ADMIN — HEPARIN SODIUM 5000 UNIT(S): 5000 INJECTION INTRAVENOUS; SUBCUTANEOUS at 05:14

## 2022-01-01 RX ADMIN — PANTOPRAZOLE SODIUM 40 MILLIGRAM(S): 20 TABLET, DELAYED RELEASE ORAL at 17:35

## 2022-01-01 RX ADMIN — HYDROMORPHONE HYDROCHLORIDE 0.5 MILLIGRAM(S): 2 INJECTION INTRAMUSCULAR; INTRAVENOUS; SUBCUTANEOUS at 06:33

## 2022-01-01 RX ADMIN — Medication 2 MILLIGRAM(S): at 05:41

## 2022-01-01 RX ADMIN — DIVALPROEX SODIUM 250 MILLIGRAM(S): 500 TABLET, DELAYED RELEASE ORAL at 13:21

## 2022-01-01 RX ADMIN — Medication 1000 MILLIGRAM(S): at 20:36

## 2022-01-01 RX ADMIN — QUETIAPINE FUMARATE 50 MILLIGRAM(S): 200 TABLET, FILM COATED ORAL at 22:13

## 2022-01-01 RX ADMIN — Medication 1 TABLET(S): at 13:05

## 2022-01-01 RX ADMIN — QUETIAPINE FUMARATE 25 MILLIGRAM(S): 200 TABLET, FILM COATED ORAL at 18:13

## 2022-01-01 RX ADMIN — DEXMEDETOMIDINE HYDROCHLORIDE IN 0.9% SODIUM CHLORIDE 9.64 MICROGRAM(S)/KG/HR: 4 INJECTION INTRAVENOUS at 05:21

## 2022-01-01 RX ADMIN — Medication 14.5 MICROGRAM(S)/KG/MIN: at 09:21

## 2022-01-01 RX ADMIN — SENNA PLUS 10 MILLILITER(S): 8.6 TABLET ORAL at 21:23

## 2022-01-01 RX ADMIN — Medication 1: at 16:14

## 2022-01-01 RX ADMIN — CARBIDOPA AND LEVODOPA 2 TABLET(S): 25; 100 TABLET ORAL at 22:12

## 2022-01-01 RX ADMIN — Medication 1000 UNIT(S): at 11:39

## 2022-01-01 RX ADMIN — Medication 1 TABLET(S): at 12:04

## 2022-01-01 RX ADMIN — Medication 125 MILLIGRAM(S): at 18:26

## 2022-01-01 RX ADMIN — PANTOPRAZOLE SODIUM 40 MILLIGRAM(S): 20 TABLET, DELAYED RELEASE ORAL at 05:59

## 2022-01-01 RX ADMIN — MIRTAZAPINE 7.5 MILLIGRAM(S): 45 TABLET, ORALLY DISINTEGRATING ORAL at 12:02

## 2022-01-01 RX ADMIN — Medication 1 MILLIGRAM(S): at 13:13

## 2022-01-01 RX ADMIN — Medication 1: at 12:06

## 2022-01-01 RX ADMIN — Medication 2 MILLIGRAM(S): at 14:02

## 2022-01-01 RX ADMIN — PIPERACILLIN AND TAZOBACTAM 25 GRAM(S): 4; .5 INJECTION, POWDER, LYOPHILIZED, FOR SOLUTION INTRAVENOUS at 21:02

## 2022-01-01 RX ADMIN — Medication 2 MILLIGRAM(S): at 05:01

## 2022-01-01 RX ADMIN — CHLORHEXIDINE GLUCONATE 1 APPLICATION(S): 213 SOLUTION TOPICAL at 05:38

## 2022-01-01 RX ADMIN — DULOXETINE HYDROCHLORIDE 20 MILLIGRAM(S): 30 CAPSULE, DELAYED RELEASE ORAL at 11:32

## 2022-01-01 RX ADMIN — DIVALPROEX SODIUM 250 MILLIGRAM(S): 500 TABLET, DELAYED RELEASE ORAL at 06:02

## 2022-01-01 RX ADMIN — Medication 2 MILLIGRAM(S): at 17:35

## 2022-01-01 RX ADMIN — Medication 1 MILLIGRAM(S): at 22:05

## 2022-01-01 RX ADMIN — CARBIDOPA AND LEVODOPA 2.5 TABLET(S): 25; 100 TABLET ORAL at 14:05

## 2022-01-01 RX ADMIN — PANTOPRAZOLE SODIUM 40 MILLIGRAM(S): 20 TABLET, DELAYED RELEASE ORAL at 05:28

## 2022-01-01 RX ADMIN — Medication 5 MILLIGRAM(S): at 12:50

## 2022-01-01 RX ADMIN — Medication 50 MILLIGRAM(S): at 21:50

## 2022-01-01 RX ADMIN — CHLORHEXIDINE GLUCONATE 1 APPLICATION(S): 213 SOLUTION TOPICAL at 06:39

## 2022-01-01 RX ADMIN — MIRTAZAPINE 7.5 MILLIGRAM(S): 45 TABLET, ORALLY DISINTEGRATING ORAL at 11:19

## 2022-01-01 RX ADMIN — QUETIAPINE FUMARATE 50 MILLIGRAM(S): 200 TABLET, FILM COATED ORAL at 22:26

## 2022-01-01 RX ADMIN — Medication 2 MILLIGRAM(S): at 00:30

## 2022-01-01 RX ADMIN — DIVALPROEX SODIUM 250 MILLIGRAM(S): 500 TABLET, DELAYED RELEASE ORAL at 06:05

## 2022-01-01 RX ADMIN — Medication 50 MILLIGRAM(S): at 22:46

## 2022-01-01 RX ADMIN — Medication 50 MILLIGRAM(S): at 21:26

## 2022-01-01 RX ADMIN — ATORVASTATIN CALCIUM 80 MILLIGRAM(S): 80 TABLET, FILM COATED ORAL at 21:06

## 2022-01-01 RX ADMIN — DEXMEDETOMIDINE HYDROCHLORIDE IN 0.9% SODIUM CHLORIDE 9.64 MICROGRAM(S)/KG/HR: 4 INJECTION INTRAVENOUS at 11:43

## 2022-01-01 RX ADMIN — Medication 2 MILLIGRAM(S): at 21:26

## 2022-01-01 RX ADMIN — INSULIN GLARGINE 6 UNIT(S): 100 INJECTION, SOLUTION SUBCUTANEOUS at 00:16

## 2022-01-01 RX ADMIN — PANTOPRAZOLE SODIUM 40 MILLIGRAM(S): 20 TABLET, DELAYED RELEASE ORAL at 00:05

## 2022-01-01 RX ADMIN — Medication 1 TABLET(S): at 13:09

## 2022-01-01 RX ADMIN — CHLORHEXIDINE GLUCONATE 15 MILLILITER(S): 213 SOLUTION TOPICAL at 06:26

## 2022-01-01 RX ADMIN — CINACALCET 60 MILLIGRAM(S): 30 TABLET, FILM COATED ORAL at 15:19

## 2022-01-01 RX ADMIN — Medication 50 MILLIGRAM(S): at 22:27

## 2022-01-01 RX ADMIN — Medication 1000 UNIT(S): at 12:03

## 2022-01-01 RX ADMIN — DIVALPROEX SODIUM 250 MILLIGRAM(S): 500 TABLET, DELAYED RELEASE ORAL at 21:29

## 2022-01-01 RX ADMIN — Medication 1 TABLET(S): at 11:17

## 2022-01-01 RX ADMIN — DIVALPROEX SODIUM 250 MILLIGRAM(S): 500 TABLET, DELAYED RELEASE ORAL at 06:33

## 2022-01-01 RX ADMIN — Medication 1: at 07:20

## 2022-01-01 RX ADMIN — QUETIAPINE FUMARATE 50 MILLIGRAM(S): 200 TABLET, FILM COATED ORAL at 21:27

## 2022-01-01 RX ADMIN — Medication 1 TABLET(S): at 11:06

## 2022-01-01 RX ADMIN — PANTOPRAZOLE SODIUM 40 MILLIGRAM(S): 20 TABLET, DELAYED RELEASE ORAL at 17:59

## 2022-01-01 RX ADMIN — Medication 20 MICROGRAM(S): at 22:52

## 2022-01-01 RX ADMIN — MEMANTINE HYDROCHLORIDE 5 MILLIGRAM(S): 10 TABLET ORAL at 21:50

## 2022-01-01 RX ADMIN — Medication 50 MILLIGRAM(S): at 12:56

## 2022-01-01 RX ADMIN — MIDODRINE HYDROCHLORIDE 30 MILLIGRAM(S): 2.5 TABLET ORAL at 17:37

## 2022-01-01 RX ADMIN — CARBIDOPA AND LEVODOPA 2 TABLET(S): 25; 100 TABLET ORAL at 22:14

## 2022-01-01 RX ADMIN — Medication 2 MILLIGRAM(S): at 22:33

## 2022-01-01 RX ADMIN — CINACALCET 60 MILLIGRAM(S): 30 TABLET, FILM COATED ORAL at 13:15

## 2022-01-01 RX ADMIN — MIRTAZAPINE 7.5 MILLIGRAM(S): 45 TABLET, ORALLY DISINTEGRATING ORAL at 11:17

## 2022-01-01 RX ADMIN — Medication 2 MILLIGRAM(S): at 21:38

## 2022-01-01 RX ADMIN — MEMANTINE HYDROCHLORIDE 5 MILLIGRAM(S): 10 TABLET ORAL at 00:29

## 2022-01-01 RX ADMIN — CARBIDOPA AND LEVODOPA 2.5 TABLET(S): 25; 100 TABLET ORAL at 06:28

## 2022-01-01 RX ADMIN — Medication 1 TABLET(S): at 12:09

## 2022-01-01 RX ADMIN — DULOXETINE HYDROCHLORIDE 20 MILLIGRAM(S): 30 CAPSULE, DELAYED RELEASE ORAL at 11:38

## 2022-01-01 RX ADMIN — DIVALPROEX SODIUM 250 MILLIGRAM(S): 500 TABLET, DELAYED RELEASE ORAL at 22:49

## 2022-01-01 RX ADMIN — CHLORHEXIDINE GLUCONATE 1 APPLICATION(S): 213 SOLUTION TOPICAL at 05:48

## 2022-01-01 RX ADMIN — INSULIN GLARGINE 6 UNIT(S): 100 INJECTION, SOLUTION SUBCUTANEOUS at 21:08

## 2022-01-01 RX ADMIN — Medication 30 MICROGRAM(S): at 21:25

## 2022-01-01 RX ADMIN — Medication 1000 UNIT(S): at 12:44

## 2022-01-01 RX ADMIN — POLYETHYLENE GLYCOL 3350 17 GRAM(S): 17 POWDER, FOR SOLUTION ORAL at 16:53

## 2022-01-01 RX ADMIN — HEPARIN SODIUM 5000 UNIT(S): 5000 INJECTION INTRAVENOUS; SUBCUTANEOUS at 18:14

## 2022-01-01 RX ADMIN — MIRTAZAPINE 7.5 MILLIGRAM(S): 45 TABLET, ORALLY DISINTEGRATING ORAL at 11:18

## 2022-01-01 RX ADMIN — SODIUM CHLORIDE 1200 MILLILITER(S): 9 INJECTION INTRAMUSCULAR; INTRAVENOUS; SUBCUTANEOUS at 12:45

## 2022-01-01 RX ADMIN — Medication 1 TABLET(S): at 11:46

## 2022-01-01 RX ADMIN — Medication 2 MILLIGRAM(S): at 23:28

## 2022-01-01 RX ADMIN — DIVALPROEX SODIUM 250 MILLIGRAM(S): 500 TABLET, DELAYED RELEASE ORAL at 21:07

## 2022-01-01 RX ADMIN — Medication 2 MILLIGRAM(S): at 22:47

## 2022-01-01 RX ADMIN — ATORVASTATIN CALCIUM 80 MILLIGRAM(S): 80 TABLET, FILM COATED ORAL at 22:35

## 2022-01-01 RX ADMIN — DEXMEDETOMIDINE HYDROCHLORIDE IN 0.9% SODIUM CHLORIDE 9.64 MICROGRAM(S)/KG/HR: 4 INJECTION INTRAVENOUS at 17:02

## 2022-01-01 RX ADMIN — CARBIDOPA AND LEVODOPA 2 TABLET(S): 25; 100 TABLET ORAL at 22:20

## 2022-01-01 RX ADMIN — Medication 2 MILLIGRAM(S): at 05:55

## 2022-01-01 RX ADMIN — MIRTAZAPINE 7.5 MILLIGRAM(S): 45 TABLET, ORALLY DISINTEGRATING ORAL at 13:14

## 2022-01-01 RX ADMIN — Medication 1: at 00:26

## 2022-01-01 RX ADMIN — ERYTHROPOIETIN 4000 UNIT(S): 10000 INJECTION, SOLUTION INTRAVENOUS; SUBCUTANEOUS at 15:36

## 2022-01-01 RX ADMIN — QUETIAPINE FUMARATE 50 MILLIGRAM(S): 200 TABLET, FILM COATED ORAL at 22:05

## 2022-01-01 RX ADMIN — QUETIAPINE FUMARATE 50 MILLIGRAM(S): 200 TABLET, FILM COATED ORAL at 22:22

## 2022-01-01 RX ADMIN — CARBIDOPA AND LEVODOPA 2.5 TABLET(S): 25; 100 TABLET ORAL at 13:02

## 2022-01-01 RX ADMIN — QUETIAPINE FUMARATE 50 MILLIGRAM(S): 200 TABLET, FILM COATED ORAL at 22:23

## 2022-01-01 RX ADMIN — Medication 2 MILLIGRAM(S): at 05:16

## 2022-01-01 RX ADMIN — Medication 1 TABLET(S): at 11:51

## 2022-01-01 RX ADMIN — MIDODRINE HYDROCHLORIDE 15 MILLIGRAM(S): 2.5 TABLET ORAL at 18:23

## 2022-01-01 RX ADMIN — Medication 0: at 12:12

## 2022-01-01 RX ADMIN — Medication 5 MILLIGRAM(S): at 21:24

## 2022-01-01 RX ADMIN — Medication 1 TABLET(S): at 11:48

## 2022-01-01 RX ADMIN — Medication 25 MILLIGRAM(S): at 18:38

## 2022-01-01 RX ADMIN — DIVALPROEX SODIUM 250 MILLIGRAM(S): 500 TABLET, DELAYED RELEASE ORAL at 22:13

## 2022-01-01 RX ADMIN — Medication 650 MILLIGRAM(S): at 23:30

## 2022-01-01 RX ADMIN — CHLORHEXIDINE GLUCONATE 1 APPLICATION(S): 213 SOLUTION TOPICAL at 06:42

## 2022-01-01 RX ADMIN — DIVALPROEX SODIUM 250 MILLIGRAM(S): 500 TABLET, DELAYED RELEASE ORAL at 13:55

## 2022-01-01 RX ADMIN — HEPARIN SODIUM 5000 UNIT(S): 5000 INJECTION INTRAVENOUS; SUBCUTANEOUS at 06:23

## 2022-01-01 RX ADMIN — HEPARIN SODIUM 5000 UNIT(S): 5000 INJECTION INTRAVENOUS; SUBCUTANEOUS at 05:06

## 2022-01-01 RX ADMIN — QUETIAPINE FUMARATE 50 MILLIGRAM(S): 200 TABLET, FILM COATED ORAL at 21:22

## 2022-01-01 RX ADMIN — SODIUM CHLORIDE 1200 MILLILITER(S): 9 INJECTION INTRAMUSCULAR; INTRAVENOUS; SUBCUTANEOUS at 13:05

## 2022-01-01 RX ADMIN — MEMANTINE HYDROCHLORIDE 5 MILLIGRAM(S): 10 TABLET ORAL at 22:16

## 2022-01-01 RX ADMIN — CARBIDOPA AND LEVODOPA 2 TABLET(S): 25; 100 TABLET ORAL at 21:59

## 2022-01-01 RX ADMIN — CINACALCET 60 MILLIGRAM(S): 30 TABLET, FILM COATED ORAL at 11:05

## 2022-01-01 RX ADMIN — PANTOPRAZOLE SODIUM 40 MILLIGRAM(S): 20 TABLET, DELAYED RELEASE ORAL at 17:21

## 2022-01-01 RX ADMIN — POLYETHYLENE GLYCOL 3350 17 GRAM(S): 17 POWDER, FOR SOLUTION ORAL at 17:36

## 2022-01-01 RX ADMIN — CINACALCET 60 MILLIGRAM(S): 30 TABLET, FILM COATED ORAL at 11:27

## 2022-01-01 RX ADMIN — Medication 5 MILLIGRAM(S): at 12:18

## 2022-01-01 RX ADMIN — ATORVASTATIN CALCIUM 80 MILLIGRAM(S): 80 TABLET, FILM COATED ORAL at 23:51

## 2022-01-01 RX ADMIN — CARBIDOPA AND LEVODOPA 2 TABLET(S): 25; 100 TABLET ORAL at 22:01

## 2022-01-01 RX ADMIN — Medication 2 MILLIGRAM(S): at 05:48

## 2022-01-01 RX ADMIN — Medication 30 MICROGRAM(S): at 21:05

## 2022-01-01 RX ADMIN — DULOXETINE HYDROCHLORIDE 20 MILLIGRAM(S): 30 CAPSULE, DELAYED RELEASE ORAL at 11:18

## 2022-01-01 RX ADMIN — Medication 50 MILLILITER(S): at 18:19

## 2022-01-01 RX ADMIN — DIVALPROEX SODIUM 250 MILLIGRAM(S): 500 TABLET, DELAYED RELEASE ORAL at 15:29

## 2022-01-01 RX ADMIN — HEPARIN SODIUM 5000 UNIT(S): 5000 INJECTION INTRAVENOUS; SUBCUTANEOUS at 18:22

## 2022-01-01 RX ADMIN — Medication 2 MILLIGRAM(S): at 13:23

## 2022-01-01 RX ADMIN — Medication 2 MILLIGRAM(S): at 14:27

## 2022-01-01 RX ADMIN — Medication 2 MILLIGRAM(S): at 05:07

## 2022-01-01 RX ADMIN — ERYTHROPOIETIN 4000 UNIT(S): 10000 INJECTION, SOLUTION INTRAVENOUS; SUBCUTANEOUS at 15:32

## 2022-01-01 RX ADMIN — QUETIAPINE FUMARATE 50 MILLIGRAM(S): 200 TABLET, FILM COATED ORAL at 21:51

## 2022-01-01 RX ADMIN — Medication 1000 UNIT(S): at 13:27

## 2022-01-01 RX ADMIN — DIVALPROEX SODIUM 250 MILLIGRAM(S): 500 TABLET, DELAYED RELEASE ORAL at 15:24

## 2022-01-01 RX ADMIN — HEPARIN SODIUM 5000 UNIT(S): 5000 INJECTION INTRAVENOUS; SUBCUTANEOUS at 05:30

## 2022-01-01 RX ADMIN — Medication 2 MILLIGRAM(S): at 14:09

## 2022-01-01 RX ADMIN — Medication 5 MILLIGRAM(S): at 17:40

## 2022-01-01 RX ADMIN — Medication 1 TABLET(S): at 11:59

## 2022-01-01 RX ADMIN — DIVALPROEX SODIUM 250 MILLIGRAM(S): 500 TABLET, DELAYED RELEASE ORAL at 22:14

## 2022-01-01 RX ADMIN — CARBIDOPA AND LEVODOPA 2.5 TABLET(S): 25; 100 TABLET ORAL at 15:25

## 2022-01-01 RX ADMIN — PANTOPRAZOLE SODIUM 40 MILLIGRAM(S): 20 TABLET, DELAYED RELEASE ORAL at 06:40

## 2022-01-01 RX ADMIN — HEPARIN SODIUM 5000 UNIT(S): 5000 INJECTION INTRAVENOUS; SUBCUTANEOUS at 06:25

## 2022-01-01 RX ADMIN — CHLORHEXIDINE GLUCONATE 15 MILLILITER(S): 213 SOLUTION TOPICAL at 05:21

## 2022-01-01 RX ADMIN — Medication 1: at 00:21

## 2022-01-01 RX ADMIN — Medication 0.5 MILLIGRAM(S): at 11:26

## 2022-01-01 RX ADMIN — Medication 1 MILLIGRAM(S): at 11:45

## 2022-01-01 RX ADMIN — HEPARIN SODIUM 5000 UNIT(S): 5000 INJECTION INTRAVENOUS; SUBCUTANEOUS at 17:58

## 2022-01-01 RX ADMIN — CHLORHEXIDINE GLUCONATE 1 APPLICATION(S): 213 SOLUTION TOPICAL at 06:04

## 2022-01-01 RX ADMIN — INSULIN GLARGINE 6 UNIT(S): 100 INJECTION, SOLUTION SUBCUTANEOUS at 23:15

## 2022-01-01 RX ADMIN — Medication 125 MILLIGRAM(S): at 01:10

## 2022-01-01 RX ADMIN — PANTOPRAZOLE SODIUM 40 MILLIGRAM(S): 20 TABLET, DELAYED RELEASE ORAL at 05:25

## 2022-01-01 RX ADMIN — CHLORHEXIDINE GLUCONATE 15 MILLILITER(S): 213 SOLUTION TOPICAL at 18:38

## 2022-01-01 RX ADMIN — SENNA PLUS 10 MILLILITER(S): 8.6 TABLET ORAL at 21:41

## 2022-01-01 RX ADMIN — MIRTAZAPINE 7.5 MILLIGRAM(S): 45 TABLET, ORALLY DISINTEGRATING ORAL at 11:48

## 2022-01-01 RX ADMIN — Medication 125 MILLIGRAM(S): at 01:00

## 2022-01-01 RX ADMIN — Medication 2 MILLIGRAM(S): at 14:07

## 2022-01-01 RX ADMIN — Medication 5 MILLIGRAM(S): at 23:42

## 2022-01-01 RX ADMIN — Medication 0.5 MILLIGRAM(S): at 05:09

## 2022-01-01 RX ADMIN — MIDODRINE HYDROCHLORIDE 10 MILLIGRAM(S): 2.5 TABLET ORAL at 13:03

## 2022-01-01 RX ADMIN — HEPARIN SODIUM 5000 UNIT(S): 5000 INJECTION INTRAVENOUS; SUBCUTANEOUS at 17:33

## 2022-01-01 RX ADMIN — Medication 2 MILLIGRAM(S): at 14:41

## 2022-01-01 RX ADMIN — Medication 50 MICROGRAM(S): at 05:13

## 2022-01-01 RX ADMIN — PANTOPRAZOLE SODIUM 40 MILLIGRAM(S): 20 TABLET, DELAYED RELEASE ORAL at 17:33

## 2022-01-01 RX ADMIN — DEXMEDETOMIDINE HYDROCHLORIDE IN 0.9% SODIUM CHLORIDE 9.64 MICROGRAM(S)/KG/HR: 4 INJECTION INTRAVENOUS at 10:26

## 2022-01-01 RX ADMIN — Medication 50 MICROGRAM(S): at 05:09

## 2022-01-01 RX ADMIN — Medication 2 MILLIGRAM(S): at 14:42

## 2022-01-01 RX ADMIN — QUETIAPINE FUMARATE 25 MILLIGRAM(S): 200 TABLET, FILM COATED ORAL at 06:15

## 2022-01-01 RX ADMIN — Medication 50 MILLIGRAM(S): at 09:51

## 2022-01-01 RX ADMIN — MEMANTINE HYDROCHLORIDE 5 MILLIGRAM(S): 10 TABLET ORAL at 21:37

## 2022-01-01 RX ADMIN — MEMANTINE HYDROCHLORIDE 5 MILLIGRAM(S): 10 TABLET ORAL at 21:43

## 2022-01-01 RX ADMIN — CARBIDOPA AND LEVODOPA 2.5 TABLET(S): 25; 100 TABLET ORAL at 18:30

## 2022-01-01 RX ADMIN — Medication 1: at 06:22

## 2022-01-01 RX ADMIN — Medication 1 PACKET(S): at 05:15

## 2022-01-01 RX ADMIN — PANTOPRAZOLE SODIUM 40 MILLIGRAM(S): 20 TABLET, DELAYED RELEASE ORAL at 17:16

## 2022-01-01 RX ADMIN — Medication 2 MILLIGRAM(S): at 15:29

## 2022-01-01 RX ADMIN — SENNA PLUS 10 MILLILITER(S): 8.6 TABLET ORAL at 22:23

## 2022-01-01 RX ADMIN — QUETIAPINE FUMARATE 50 MILLIGRAM(S): 200 TABLET, FILM COATED ORAL at 22:04

## 2022-01-01 RX ADMIN — MIDODRINE HYDROCHLORIDE 10 MILLIGRAM(S): 2.5 TABLET ORAL at 13:09

## 2022-01-01 RX ADMIN — MIDODRINE HYDROCHLORIDE 10 MILLIGRAM(S): 2.5 TABLET ORAL at 12:12

## 2022-01-01 RX ADMIN — ERYTHROPOIETIN 10000 UNIT(S): 10000 INJECTION, SOLUTION INTRAVENOUS; SUBCUTANEOUS at 16:32

## 2022-01-01 RX ADMIN — DIVALPROEX SODIUM 250 MILLIGRAM(S): 500 TABLET, DELAYED RELEASE ORAL at 22:54

## 2022-01-01 RX ADMIN — DIVALPROEX SODIUM 250 MILLIGRAM(S): 500 TABLET, DELAYED RELEASE ORAL at 05:00

## 2022-01-01 RX ADMIN — SODIUM CHLORIDE 20 MILLILITER(S): 9 INJECTION, SOLUTION INTRAVENOUS at 09:21

## 2022-01-01 RX ADMIN — HEPARIN SODIUM 5000 UNIT(S): 5000 INJECTION INTRAVENOUS; SUBCUTANEOUS at 06:24

## 2022-01-01 RX ADMIN — HYDROMORPHONE HYDROCHLORIDE 0.5 MILLIGRAM(S): 2 INJECTION INTRAMUSCULAR; INTRAVENOUS; SUBCUTANEOUS at 12:34

## 2022-01-01 RX ADMIN — PANTOPRAZOLE SODIUM 40 MILLIGRAM(S): 20 TABLET, DELAYED RELEASE ORAL at 06:24

## 2022-01-01 RX ADMIN — DIVALPROEX SODIUM 250 MILLIGRAM(S): 500 TABLET, DELAYED RELEASE ORAL at 23:57

## 2022-01-01 RX ADMIN — Medication 100 MILLIEQUIVALENT(S): at 01:49

## 2022-01-01 RX ADMIN — DIVALPROEX SODIUM 250 MILLIGRAM(S): 500 TABLET, DELAYED RELEASE ORAL at 05:07

## 2022-01-01 RX ADMIN — QUETIAPINE FUMARATE 25 MILLIGRAM(S): 200 TABLET, FILM COATED ORAL at 17:23

## 2022-01-01 RX ADMIN — CARBIDOPA AND LEVODOPA 2.5 TABLET(S): 25; 100 TABLET ORAL at 06:33

## 2022-01-01 RX ADMIN — CARBIDOPA AND LEVODOPA 2.5 TABLET(S): 25; 100 TABLET ORAL at 14:30

## 2022-01-01 RX ADMIN — INSULIN GLARGINE 6 UNIT(S): 100 INJECTION, SOLUTION SUBCUTANEOUS at 21:56

## 2022-01-01 RX ADMIN — CINACALCET 60 MILLIGRAM(S): 30 TABLET, FILM COATED ORAL at 13:06

## 2022-01-01 RX ADMIN — CHLORHEXIDINE GLUCONATE 1 APPLICATION(S): 213 SOLUTION TOPICAL at 06:18

## 2022-01-01 RX ADMIN — Medication 40 MILLIEQUIVALENT(S): at 18:27

## 2022-01-01 RX ADMIN — QUETIAPINE FUMARATE 25 MILLIGRAM(S): 200 TABLET, FILM COATED ORAL at 02:38

## 2022-01-01 RX ADMIN — Medication 1: at 12:26

## 2022-01-01 RX ADMIN — CINACALCET 60 MILLIGRAM(S): 30 TABLET, FILM COATED ORAL at 11:14

## 2022-01-01 RX ADMIN — Medication 50 MILLIGRAM(S): at 10:27

## 2022-01-01 RX ADMIN — MEMANTINE HYDROCHLORIDE 5 MILLIGRAM(S): 10 TABLET ORAL at 22:15

## 2022-01-01 RX ADMIN — INSULIN GLARGINE 6 UNIT(S): 100 INJECTION, SOLUTION SUBCUTANEOUS at 21:51

## 2022-01-01 RX ADMIN — Medication 50 MILLIGRAM(S): at 11:55

## 2022-01-01 RX ADMIN — Medication 2 MILLIGRAM(S): at 13:20

## 2022-01-01 RX ADMIN — Medication 1 TABLET(S): at 12:40

## 2022-01-01 RX ADMIN — CHLORHEXIDINE GLUCONATE 1 APPLICATION(S): 213 SOLUTION TOPICAL at 06:44

## 2022-01-01 RX ADMIN — MEMANTINE HYDROCHLORIDE 5 MILLIGRAM(S): 10 TABLET ORAL at 22:34

## 2022-01-01 RX ADMIN — SENNA PLUS 10 MILLILITER(S): 8.6 TABLET ORAL at 23:55

## 2022-01-01 RX ADMIN — QUETIAPINE FUMARATE 50 MILLIGRAM(S): 200 TABLET, FILM COATED ORAL at 22:01

## 2022-01-01 RX ADMIN — DIVALPROEX SODIUM 250 MILLIGRAM(S): 500 TABLET, DELAYED RELEASE ORAL at 15:31

## 2022-01-01 RX ADMIN — DIVALPROEX SODIUM 250 MILLIGRAM(S): 500 TABLET, DELAYED RELEASE ORAL at 05:12

## 2022-01-01 RX ADMIN — Medication 2 MILLIGRAM(S): at 21:09

## 2022-01-01 RX ADMIN — Medication 50 MILLILITER(S): at 12:32

## 2022-01-01 RX ADMIN — DEXMEDETOMIDINE HYDROCHLORIDE IN 0.9% SODIUM CHLORIDE 9.64 MICROGRAM(S)/KG/HR: 4 INJECTION INTRAVENOUS at 17:37

## 2022-01-01 RX ADMIN — INSULIN GLARGINE 6 UNIT(S): 100 INJECTION, SOLUTION SUBCUTANEOUS at 22:08

## 2022-01-01 RX ADMIN — DIVALPROEX SODIUM 250 MILLIGRAM(S): 500 TABLET, DELAYED RELEASE ORAL at 05:01

## 2022-01-01 RX ADMIN — Medication 125 MILLIGRAM(S): at 06:15

## 2022-01-01 RX ADMIN — CARBIDOPA AND LEVODOPA 2 TABLET(S): 25; 100 TABLET ORAL at 21:23

## 2022-01-01 RX ADMIN — Medication 30 MICROGRAM(S): at 22:11

## 2022-01-01 RX ADMIN — DIVALPROEX SODIUM 250 MILLIGRAM(S): 500 TABLET, DELAYED RELEASE ORAL at 05:53

## 2022-01-01 RX ADMIN — DIVALPROEX SODIUM 250 MILLIGRAM(S): 500 TABLET, DELAYED RELEASE ORAL at 21:51

## 2022-01-01 RX ADMIN — CARBIDOPA AND LEVODOPA 2.5 TABLET(S): 25; 100 TABLET ORAL at 06:42

## 2022-01-01 RX ADMIN — DIVALPROEX SODIUM 250 MILLIGRAM(S): 500 TABLET, DELAYED RELEASE ORAL at 00:47

## 2022-01-01 RX ADMIN — MIDODRINE HYDROCHLORIDE 15 MILLIGRAM(S): 2.5 TABLET ORAL at 02:23

## 2022-01-01 RX ADMIN — DULOXETINE HYDROCHLORIDE 20 MILLIGRAM(S): 30 CAPSULE, DELAYED RELEASE ORAL at 16:43

## 2022-01-01 NOTE — PROGRESS NOTE ADULT - SUBJECTIVE AND OBJECTIVE BOX
Endocrinology Attending Covering for Dr. Reece      Chief complaint  Patient is a 71y old  Male who presents with a chief complaint of leg swelling (31 Dec 2021 12:41)   Review of systems  Patient in bed, looks comfortable, no fever,  had no hypoglycemia.    Labs and Fingersticks  CAPILLARY BLOOD GLUCOSE      POCT Blood Glucose.: 128 mg/dL (01 Jan 2022 07:45)  POCT Blood Glucose.: 107 mg/dL (31 Dec 2021 21:32)  POCT Blood Glucose.: 145 mg/dL (31 Dec 2021 16:45)  POCT Blood Glucose.: 145 mg/dL (31 Dec 2021 11:52)      Anion Gap, Serum: 12 (12-31 @ 05:16)      Calcium, Total Serum: 9.8 (12-31 @ 05:16)  Albumin, Serum: 2.4 *L* (12-31 @ 05:16)    Alanine Aminotransferase (ALT/SGPT): <5 *L* (12-31 @ 05:16)  Alkaline Phosphatase, Serum: 88 (12-31 @ 05:16)  Aspartate Aminotransferase (AST/SGOT): 28 (12-31 @ 05:16)        12-31    138  |  100  |  24<H>  ----------------------------<  93  4.5   |  26  |  3.26<H>    Ca    9.8      31 Dec 2021 05:16  Phos  3.9     12-31  Mg     2.1     12-31    TPro  8.3  /  Alb  2.4<L>  /  TBili  0.6  /  DBili  x   /  AST  28  /  ALT  <5<L>  /  AlkPhos  88  12-31                        10.4   8.80  )-----------( 122      ( 31 Dec 2021 05:17 )             34.9     Medications  MEDICATIONS  (STANDING):  atorvastatin 80 milliGRAM(s) Oral at bedtime  carbidopa/levodopa  25/100 2.5 Tablet(s) Oral <User Schedule>  carbidopa/levodopa  25/100 2 Tablet(s) Oral <User Schedule>  chlorhexidine 4% Liquid 1 Application(s) Topical <User Schedule>  cholecalciferol 1000 Unit(s) Oral daily  cinacalcet 60 milliGRAM(s) Oral daily  dextrose 40% Gel 15 Gram(s) Oral once  dextrose 5%. 1000 milliLiter(s) (50 mL/Hr) IV Continuous <Continuous>  dextrose 5%. 1000 milliLiter(s) (100 mL/Hr) IV Continuous <Continuous>  dextrose 50% Injectable 25 Gram(s) IV Push once  dextrose 50% Injectable 12.5 Gram(s) IV Push once  dextrose 50% Injectable 25 Gram(s) IV Push once  diVALproex Sprinkle 250 milliGRAM(s) Oral three times a day  DULoxetine 20 milliGRAM(s) Oral daily  folic acid 1 milliGRAM(s) Oral daily  glucagon  Injectable 1 milliGRAM(s) IntraMuscular once  heparin   Injectable 5000 Unit(s) SubCutaneous every 12 hours  insulin lispro (ADMELOG) corrective regimen sliding scale   SubCutaneous three times a day before meals  insulin lispro (ADMELOG) corrective regimen sliding scale   SubCutaneous at bedtime  levothyroxine 50 MICROGram(s) Oral daily  memantine 5 milliGRAM(s) Oral at bedtime  metoprolol tartrate 50 milliGRAM(s) Oral two times a day  midodrine 10 milliGRAM(s) Oral <User Schedule>  mirtazapine 7.5 milliGRAM(s) Oral daily  Nephro-celeste 1 Tablet(s) Oral daily  polyethylene glycol 3350 17 Gram(s) Oral two times a day  QUEtiapine 25 milliGRAM(s) Oral three times a day  senna Syrup 10 milliLiter(s) Oral at bedtime  trihexyphenidyl 2 milliGRAM(s) Oral three times a day      Physical Exam  General: Patient comfortable in bed  Vital Signs Last 12 Hrs  T(F): 97.3 (01-01-22 @ 05:41), Max: 97.3 (01-01-22 @ 05:41)  HR: 96 (01-01-22 @ 05:41) (96 - 96)  BP: 106/66 (01-01-22 @ 05:41) (106/66 - 106/66)  BP(mean): --  RR: 18 (01-01-22 @ 05:41) (18 - 18)  SpO2: 96% (01-01-22 @ 05:41) (96% - 96%)  Neck: No palpable thyroid nodules.  CVS: S1S2, No murmurs  Respiratory: No wheezing, no crepitations  GI: Abdomen soft, bowel sounds positive  Musculoskeletal:  edema lower extremities.   Skin: No skin rashes, no ecchymosis    Diagnostics

## 2022-01-01 NOTE — PROGRESS NOTE ADULT - SUBJECTIVE AND OBJECTIVE BOX
Patient is a 71y old  Male who presents with a chief complaint of leg swelling (01 Jan 2022 09:16)      INTERVAL HPI/OVERNIGHT EVENTS: noted  pt seen and examined this am   events noted  feels well  calm and cooperative  mittens on      Vital Signs Last 24 Hrs  T(C): 36.3 (01 Jan 2022 10:59), Max: 36.4 (31 Dec 2021 20:29)  T(F): 97.4 (01 Jan 2022 10:59), Max: 97.6 (31 Dec 2021 20:29)  HR: 65 (01 Jan 2022 10:59) (65 - 96)  BP: 128/65 (01 Jan 2022 10:59) (106/66 - 136/82)  BP(mean): --  RR: 18 (01 Jan 2022 10:59) (18 - 18)  SpO2: 98% (01 Jan 2022 10:59) (96% - 98%)    acetaminophen     Tablet .. 650 milliGRAM(s) Oral every 6 hours PRN  albuterol/ipratropium for Nebulization 3 milliLiter(s) Nebulizer every 6 hours PRN  atorvastatin 80 milliGRAM(s) Oral at bedtime  carbidopa/levodopa  25/100 2.5 Tablet(s) Oral <User Schedule>  carbidopa/levodopa  25/100 2 Tablet(s) Oral <User Schedule>  chlorhexidine 4% Liquid 1 Application(s) Topical <User Schedule>  cholecalciferol 1000 Unit(s) Oral daily  cinacalcet 60 milliGRAM(s) Oral daily  dextrose 40% Gel 15 Gram(s) Oral once  dextrose 5%. 1000 milliLiter(s) IV Continuous <Continuous>  dextrose 5%. 1000 milliLiter(s) IV Continuous <Continuous>  dextrose 50% Injectable 25 Gram(s) IV Push once  dextrose 50% Injectable 12.5 Gram(s) IV Push once  dextrose 50% Injectable 25 Gram(s) IV Push once  diVALproex Sprinkle 125 milliGRAM(s) Oral every 8 hours PRN  diVALproex Sprinkle 250 milliGRAM(s) Oral three times a day  DULoxetine 20 milliGRAM(s) Oral daily  folic acid 1 milliGRAM(s) Oral daily  glucagon  Injectable 1 milliGRAM(s) IntraMuscular once  guaiFENesin Oral Liquid (Sugar-Free) 200 milliGRAM(s) Oral every 6 hours PRN  heparin   Injectable 5000 Unit(s) SubCutaneous every 12 hours  insulin lispro (ADMELOG) corrective regimen sliding scale   SubCutaneous three times a day before meals  insulin lispro (ADMELOG) corrective regimen sliding scale   SubCutaneous at bedtime  levothyroxine 50 MICROGram(s) Oral daily  memantine 5 milliGRAM(s) Oral at bedtime  metoprolol tartrate 50 milliGRAM(s) Oral two times a day  midodrine 10 milliGRAM(s) Oral <User Schedule>  mirtazapine 7.5 milliGRAM(s) Oral daily  Nephro-celeste 1 Tablet(s) Oral daily  ondansetron Injectable 4 milliGRAM(s) IV Push once PRN  polyethylene glycol 3350 17 Gram(s) Oral two times a day  QUEtiapine 25 milliGRAM(s) Oral three times a day  QUEtiapine 12.5 milliGRAM(s) Oral every 6 hours PRN  senna Syrup 10 milliLiter(s) Oral at bedtime  sodium chloride 0.9% lock flush 10 milliLiter(s) IV Push every 1 hour PRN  trihexyphenidyl 2 milliGRAM(s) Oral three times a day      PHYSICAL EXAM:  GENERAL: NAD,   EYES: conjunctiva and sclera clear  ENMT: Moist mucous membranes  NECK: Supple, No JVD, Normal thyroid  CHEST/LUNG: non labored, cta b/l  HEART: Regular rate and rhythm; No murmurs, rubs, or gallops  ABDOMEN: Soft, Nontender, Nondistended; Bowel sounds present  EXTREMITIES:  2+ Peripheral Pulses, No clubbing, cyanosis, or edema  LYMPH: No lymphadenopathy noted  SKIN: No rashes or lesions    Consultant(s) Notes Reviewed:  [x ] YES  [ ] NO  Care Discussed with Consultants/Other Providers [ x] YES  [ ] NO    LABS:                        10.4   8.80  )-----------( 122      ( 31 Dec 2021 05:17 )             34.9     12-31    138  |  100  |  24<H>  ----------------------------<  93  4.5   |  26  |  3.26<H>    Ca    9.8      31 Dec 2021 05:16  Phos  3.9     12-31  Mg     2.1     12-31    TPro  8.3  /  Alb  2.4<L>  /  TBili  0.6  /  DBili  x   /  AST  28  /  ALT  <5<L>  /  AlkPhos  88  12-31        CAPILLARY BLOOD GLUCOSE      POCT Blood Glucose.: 100 mg/dL (01 Jan 2022 17:13)  POCT Blood Glucose.: 97 mg/dL (01 Jan 2022 11:40)  POCT Blood Glucose.: 128 mg/dL (01 Jan 2022 07:45)  POCT Blood Glucose.: 107 mg/dL (31 Dec 2021 21:32)              RADIOLOGY & ADDITIONAL TESTS:    Imaging Personally Reviewed:  [x ] YES  [ ] NO

## 2022-01-01 NOTE — PROGRESS NOTE ADULT - ASSESSMENT
72yo M w/ PMHx of CAD (s/p CABG 2019), CKD (unknown stage), DM2, Parkinson's Disease, HTN, depression presents with new onset acute heart failure exacerbation, NSTEMI, started on hep gtt, developed bl RP bleed, acute anemia. sp MICU course for hemorrhagic shock, 10/28 sp IR embolization l4 and l5 lumbar artery. sp permacath and AVF.    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 related paranoia and agitation    # DM2 (diabetes mellitus, type 2)  per endo  hba1c 6.4    # CAD (coronary artery disease)  # HTN  lopressor, atorvastatin  asa on hold    # FTT  no improvement on remeron, cont to monitor, calorie count  son HCP is interested in artificial nutrition if necessary, GI consult  cont supplement shakes  palliative reconsult to address nutrition goc  ?PEG placement   rpt calorie count?    # surveillance COVID  positive 12/29  monitor o2 sats- sating weell on RA  will hold off on remedesivir or decadron for now    DVT ppx  venodyne  (Given increase risk factors, fall risk, extensive RP bleed, not a candidate for full AC for a.flutter.   However, may consider low dose Hep SQ BID for DVT ppx in the future if hgb remain stable)    PCP Dr. Simons    DNR/DNI    [facility]  [phone]

## 2022-01-01 NOTE — PROGRESS NOTE ADULT - ASSESSMENT
Assessment  DMT2: 71y Male with DM T2 with hyperglycemia, A1C 6.4%, was on insulin at home, now on insulin coverage only, blood sugars are stable and trending within acceptable range, no hypoglycemias, breathing comfortable on room air, holding off on remdesevir/dexa for now, not eating much.  Hypothyroidism: Patient has no history thyroid disease, was not on any thyroid supplements, subclinical with low-normal FT4, lethargic, started on synthroid 25 mcg po daily, repeat FT4 trending down, increased to synthroid  50 mcg po daily, FT4 improving.  Hypercalcemia: Started on Sensipar 60mg daily, Ca fluctuating/remains elevated..  CHF: on medications, stable, monitored.  HTN: Controlled,  on antihypertensive medications.  Parkinsons: on meds, monitored.  CKD: Monitor labs/BMP      Dr Ayers  Cell : [phone]

## 2022-01-02 NOTE — PROGRESS NOTE ADULT - ASSESSMENT
70yo M w/ PMHx of CAD (s/p CABG 2019), CKD (unknown stage), DM2, Parkinson's Disease, HTN, depression presents with new onset acute heart failure exacerbation, NSTEMI, started on hep gtt, developed bl RP bleed, acute anemia. sp MICU course for hemorrhagic shock, 10/28 sp IR embolization l4 and l5 lumbar artery. sp permacath and AVF.    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 related paranoia and agitation    # DM2 (diabetes mellitus, type 2)  per endo  hba1c 6.4    # CAD (coronary artery disease)  # HTN  lopressor, atorvastatin  asa on hold    # FTT  no improvement on remeron, cont to monitor, calorie count  son HCP is interested in artificial nutrition if necessary, GI consult  cont supplement shakes  palliative reconsult to address nutrition goc  ?PEG placement   rpt calorie count?    # surveillance COVID  positive 12/29  monitor o2 sats- sating weell on RA  will hold off on remedesivir or decadron for now    DVT ppx  venodyne  (Given increase risk factors, fall risk, extensive RP bleed, not a candidate for full AC for a.flutter.   However, may consider low dose Hep SQ BID for DVT ppx in the future if hgb remain stable)    PCP Dr. Simons    DNR/DNI    [facility]  [phone]

## 2022-01-02 NOTE — PROGRESS NOTE ADULT - SUBJECTIVE AND OBJECTIVE BOX
Patient is a 71y old  Male who presents with a chief complaint of leg swelling (02 Jan 2022 14:18)      INTERVAL HPI/OVERNIGHT EVENTS: noted  pt seen and examined this am   events noted  HD at bedside      Vital Signs Last 24 Hrs  T(C): 36.3 (02 Jan 2022 15:00), Max: 36.8 (01 Jan 2022 20:03)  T(F): 97.3 (02 Jan 2022 15:00), Max: 98.3 (01 Jan 2022 20:03)  HR: 117 (02 Jan 2022 15:00) (82 - 117)  BP: 117/76 (02 Jan 2022 15:00) (116/74 - 141/92)  BP(mean): --  RR: 18 (02 Jan 2022 15:00) (18 - 18)  SpO2: 96% (02 Jan 2022 15:00) (95% - 97%)    acetaminophen     Tablet .. 650 milliGRAM(s) Oral every 6 hours PRN  albuterol/ipratropium for Nebulization 3 milliLiter(s) Nebulizer every 6 hours PRN  atorvastatin 80 milliGRAM(s) Oral at bedtime  carbidopa/levodopa  25/100 2.5 Tablet(s) Oral <User Schedule>  carbidopa/levodopa  25/100 2 Tablet(s) Oral <User Schedule>  chlorhexidine 4% Liquid 1 Application(s) Topical <User Schedule>  cholecalciferol 1000 Unit(s) Oral daily  cinacalcet 60 milliGRAM(s) Oral daily  dextrose 40% Gel 15 Gram(s) Oral once  dextrose 5%. 1000 milliLiter(s) IV Continuous <Continuous>  dextrose 5%. 1000 milliLiter(s) IV Continuous <Continuous>  dextrose 50% Injectable 25 Gram(s) IV Push once  dextrose 50% Injectable 12.5 Gram(s) IV Push once  dextrose 50% Injectable 25 Gram(s) IV Push once  diVALproex Sprinkle 125 milliGRAM(s) Oral every 8 hours PRN  diVALproex Sprinkle 250 milliGRAM(s) Oral three times a day  DULoxetine 20 milliGRAM(s) Oral daily  folic acid 1 milliGRAM(s) Oral daily  glucagon  Injectable 1 milliGRAM(s) IntraMuscular once  guaiFENesin Oral Liquid (Sugar-Free) 200 milliGRAM(s) Oral every 6 hours PRN  heparin   Injectable 5000 Unit(s) SubCutaneous every 12 hours  insulin lispro (ADMELOG) corrective regimen sliding scale   SubCutaneous three times a day before meals  insulin lispro (ADMELOG) corrective regimen sliding scale   SubCutaneous at bedtime  levothyroxine 50 MICROGram(s) Oral daily  memantine 5 milliGRAM(s) Oral at bedtime  metoprolol tartrate 50 milliGRAM(s) Oral two times a day  midodrine 10 milliGRAM(s) Oral <User Schedule>  mirtazapine 7.5 milliGRAM(s) Oral daily  Nephro-celeste 1 Tablet(s) Oral daily  ondansetron Injectable 4 milliGRAM(s) IV Push once PRN  polyethylene glycol 3350 17 Gram(s) Oral two times a day  QUEtiapine 25 milliGRAM(s) Oral three times a day  QUEtiapine 12.5 milliGRAM(s) Oral every 6 hours PRN  senna Syrup 10 milliLiter(s) Oral at bedtime  sodium chloride 0.9% lock flush 10 milliLiter(s) IV Push every 1 hour PRN  trihexyphenidyl 2 milliGRAM(s) Oral three times a day      PHYSICAL EXAM:  GENERAL: NAD,   EYES: conjunctiva and sclera clear  ENMT: Moist mucous membranes  NECK: Supple, No JVD, Normal thyroid  CHEST/LUNG: non labored, cta b/l  HEART: Regular rate and rhythm; No murmurs, rubs, or gallops  ABDOMEN: Soft, Nontender, Nondistended; Bowel sounds present  EXTREMITIES:  2+ Peripheral Pulses, No clubbing, cyanosis, or edema  LYMPH: No lymphadenopathy noted  SKIN: No rashes or lesions    Consultant(s) Notes Reviewed:  [x ] YES  [ ] NO  Care Discussed with Consultants/Other Providers [ x] YES  [ ] NO    LABS:                        10.5   7.42  )-----------( 136      ( 02 Jan 2022 05:44 )             36.1     01-02    141  |  100  |  52<H>  ----------------------------<  92  4.5   |  26  |  5.53<H>    Ca    9.5      02 Jan 2022 05:44  Phos  5.7     01-02  Mg     2.2     01-02          CAPILLARY BLOOD GLUCOSE      POCT Blood Glucose.: 82 mg/dL (02 Jan 2022 11:41)  POCT Blood Glucose.: 94 mg/dL (02 Jan 2022 07:41)  POCT Blood Glucose.: 91 mg/dL (01 Jan 2022 21:38)  POCT Blood Glucose.: 100 mg/dL (01 Jan 2022 17:13)              RADIOLOGY & ADDITIONAL TESTS:    Imaging Personally Reviewed:  [x ] YES  [ ] NO

## 2022-01-02 NOTE — PROGRESS NOTE ADULT - PROBLEM SELECTOR PLAN 1
Will continue coverage scale ac/hs. Will continue monitoring FS and FU.  Suggest DC on Tradjenta 5mg daily, discontinue prior hypoglycemic agents/insulin, endo FU 4 weeks.   patient to increase PO intake, nutritional supplements as tolerated, FU RD recommendations.

## 2022-01-02 NOTE — PROGRESS NOTE ADULT - ASSESSMENT
ASSESSMENT/PLAN  72yo M w/ PMHx of CAD (s/p CABG 2019), CKD (unknown stage), DM2, Parkinson's Disease, HTN, depression presents with bilateral leg swelling  ESRD   Severe LV dysfunction; A flutter    + COVID      1 Palliation - Intermittent confusion ;  Can the seroquel be tapered....seems to be too lethargic   2 Renal-  HD today  3 CVS- BP controlled at present, on Midodrine with parameters, Lopressor for heart rate control   4 Anemia - hgb improving  5 Vasc - s/p  LUE AVF--Immature (this will take weeks to months to mature) ;  If he stays longer then will try for BAM maturation via IR ;  Perm cath   6 Endo - Off the lantus at present due to hypoglycemia   7 GI - on Remeron now  8 ID- Covid positive but asymptomatic    Natividad Friedman NP-C  [facility]  [phone]

## 2022-01-02 NOTE — PROGRESS NOTE ADULT - SUBJECTIVE AND OBJECTIVE BOX
Chief complaint  Patient is a 71y old  Male who presents with a chief complaint of leg swelling (02 Jan 2022 13:30)   Review of systems  Patient in bed, looks comfortable, no hypoglycemic episodes.    Labs and Fingersticks  CAPILLARY BLOOD GLUCOSE      POCT Blood Glucose.: 82 mg/dL (02 Jan 2022 11:41)  POCT Blood Glucose.: 94 mg/dL (02 Jan 2022 07:41)  POCT Blood Glucose.: 91 mg/dL (01 Jan 2022 21:38)  POCT Blood Glucose.: 100 mg/dL (01 Jan 2022 17:13)      Anion Gap, Serum: 15 (01-02 @ 05:44)      Calcium, Total Serum: 9.5 (01-02 @ 05:44)          01-02    141  |  100  |  52<H>  ----------------------------<  92  4.5   |  26  |  5.53<H>    Ca    9.5      02 Jan 2022 05:44  Phos  5.7     01-02  Mg     2.2     01-02                          10.5   7.42  )-----------( 136      ( 02 Jan 2022 05:44 )             36.1     Medications  MEDICATIONS  (STANDING):  atorvastatin 80 milliGRAM(s) Oral at bedtime  carbidopa/levodopa  25/100 2.5 Tablet(s) Oral <User Schedule>  carbidopa/levodopa  25/100 2 Tablet(s) Oral <User Schedule>  chlorhexidine 4% Liquid 1 Application(s) Topical <User Schedule>  cholecalciferol 1000 Unit(s) Oral daily  cinacalcet 60 milliGRAM(s) Oral daily  dextrose 40% Gel 15 Gram(s) Oral once  dextrose 5%. 1000 milliLiter(s) (50 mL/Hr) IV Continuous <Continuous>  dextrose 5%. 1000 milliLiter(s) (100 mL/Hr) IV Continuous <Continuous>  dextrose 50% Injectable 25 Gram(s) IV Push once  dextrose 50% Injectable 12.5 Gram(s) IV Push once  dextrose 50% Injectable 25 Gram(s) IV Push once  diVALproex Sprinkle 250 milliGRAM(s) Oral three times a day  DULoxetine 20 milliGRAM(s) Oral daily  folic acid 1 milliGRAM(s) Oral daily  glucagon  Injectable 1 milliGRAM(s) IntraMuscular once  heparin   Injectable 5000 Unit(s) SubCutaneous every 12 hours  insulin lispro (ADMELOG) corrective regimen sliding scale   SubCutaneous three times a day before meals  insulin lispro (ADMELOG) corrective regimen sliding scale   SubCutaneous at bedtime  levothyroxine 50 MICROGram(s) Oral daily  memantine 5 milliGRAM(s) Oral at bedtime  metoprolol tartrate 50 milliGRAM(s) Oral two times a day  midodrine 10 milliGRAM(s) Oral <User Schedule>  mirtazapine 7.5 milliGRAM(s) Oral daily  Nephro-celeste 1 Tablet(s) Oral daily  polyethylene glycol 3350 17 Gram(s) Oral two times a day  QUEtiapine 25 milliGRAM(s) Oral three times a day  senna Syrup 10 milliLiter(s) Oral at bedtime  trihexyphenidyl 2 milliGRAM(s) Oral three times a day      Physical Exam  General: Patient comfortable in bed  Vital Signs Last 12 Hrs  T(F): 97.3 (01-02-22 @ 10:52), Max: 97.3 (01-02-22 @ 05:04)  HR: 82 (01-02-22 @ 10:52) (82 - 95)  BP: 133/75 (01-02-22 @ 10:52) (116/74 - 133/75)  BP(mean): --  RR: 18 (01-02-22 @ 10:52) (18 - 18)  SpO2: 96% (01-02-22 @ 10:52) (95% - 96%)  Neck: No palpable thyroid nodules.  CVS: S1S2, No murmurs  Respiratory: No wheezing, no crepitations  GI: Abdomen soft, bowel sounds positive  Musculoskeletal:  edema lower extremities.   Skin: No skin rashes, no ecchymosis    Diagnostics    Free Thyroxine, Serum: AM Sched. Collection: 16-Dec-2021 06:00 (12-15 @ 14:39)  Free Thyroxine, Serum: AM Sched. Collection: 08-Dec-2021 06:00 (12-07 @ 14:26)

## 2022-01-02 NOTE — PROGRESS NOTE ADULT - ASSESSMENT
Assessment  DMT2: 71y Male with DM T2 with hyperglycemia, A1C 6.4%, was on insulin at home, now on insulin coverage only, blood sugars are stable and trending within lower-acceptable range, no hypoglycemias, breathing comfortable on room air, NAD, not eating much per discussion with nursing staff.  Hypothyroidism: Patient has no history thyroid disease, was not on any thyroid supplements, subclinical with low-normal FT4, lethargic, started on synthroid 25 mcg po daily, repeat FT4 trending down, increased to synthroid  50 mcg po daily, FT4 improving.  Hypercalcemia: Started on Sensipar 60mg daily, Ca fluctuating/remains elevated..  CHF: on medications, stable, monitored.  HTN: Controlled,  on antihypertensive medications.  Parkinsons: on meds, monitored.  CKD: Monitor labs/BMP      Dr Ayers  Cell : [phone]

## 2022-01-02 NOTE — PROGRESS NOTE ADULT - SUBJECTIVE AND OBJECTIVE BOX
NEPHROLOGY     Patient seen and examined on HD. Pt without complaints, no pain, no sob, in no acute distress.     MEDICATIONS  (STANDING):  atorvastatin 80 milliGRAM(s) Oral at bedtime  carbidopa/levodopa  25/100 2.5 Tablet(s) Oral <User Schedule>  carbidopa/levodopa  25/100 2 Tablet(s) Oral <User Schedule>  chlorhexidine 4% Liquid 1 Application(s) Topical <User Schedule>  cholecalciferol 1000 Unit(s) Oral daily  cinacalcet 60 milliGRAM(s) Oral daily  dextrose 40% Gel 15 Gram(s) Oral once  dextrose 5%. 1000 milliLiter(s) (50 mL/Hr) IV Continuous <Continuous>  dextrose 5%. 1000 milliLiter(s) (100 mL/Hr) IV Continuous <Continuous>  dextrose 50% Injectable 25 Gram(s) IV Push once  dextrose 50% Injectable 12.5 Gram(s) IV Push once  dextrose 50% Injectable 25 Gram(s) IV Push once  diVALproex Sprinkle 250 milliGRAM(s) Oral three times a day  DULoxetine 20 milliGRAM(s) Oral daily  folic acid 1 milliGRAM(s) Oral daily  glucagon  Injectable 1 milliGRAM(s) IntraMuscular once  heparin   Injectable 5000 Unit(s) SubCutaneous every 12 hours  insulin lispro (ADMELOG) corrective regimen sliding scale   SubCutaneous three times a day before meals  insulin lispro (ADMELOG) corrective regimen sliding scale   SubCutaneous at bedtime  levothyroxine 50 MICROGram(s) Oral daily  memantine 5 milliGRAM(s) Oral at bedtime  metoprolol tartrate 50 milliGRAM(s) Oral two times a day  midodrine 10 milliGRAM(s) Oral <User Schedule>  mirtazapine 7.5 milliGRAM(s) Oral daily  Nephro-celeste 1 Tablet(s) Oral daily  polyethylene glycol 3350 17 Gram(s) Oral two times a day  QUEtiapine 25 milliGRAM(s) Oral three times a day  senna Syrup 10 milliLiter(s) Oral at bedtime  trihexyphenidyl 2 milliGRAM(s) Oral three times a day    VITALS:  T(C): , Max: 36.8 (01-01-22 @ 20:03)  T(F): , Max: 98.3 (01-01-22 @ 20:03)  HR: 82 (01-02-22 @ 10:52)  BP: 133/75 (01-02-22 @ 10:52)  RR: 18 (01-02-22 @ 10:52)  SpO2: 96% (01-02-22 @ 10:52)    I and O's:    01-01 @ 07:01  -  01-02 @ 07:00  --------------------------------------------------------  IN: 130 mL / OUT: 0 mL / NET: 130 mL    PHYSICAL EXAM:  Constitutional: NAD  Neck:  No JVD  Respiratory: CTAB/L  Cardiovascular: S1 and S2  Gastrointestinal: BS+, soft, NT/ND  Extremities: No peripheral edema  Neurological:   no focal deficits  Psychiatric: Normal mood, normal affect  : No Javier  Skin: No rashes  Access: avf and perm cath (accessed)     LABS:                        10.5   7.42  )-----------( 136      ( 02 Jan 2022 05:44 )             36.1     01-02    141  |  100  |  52<H>  ----------------------------<  92  4.5   |  26  |  5.53<H>    Ca    9.5      02 Jan 2022 05:44  Phos  5.7     01-02  Mg     2.2     01-02

## 2022-01-03 NOTE — PROGRESS NOTE ADULT - ASSESSMENT
70yo M w/ PMHx of CAD (s/p CABG 2019), CKD (unknown stage), DM2, Parkinson's Disease, HTN, depression presents with new onset acute heart failure exacerbation, NSTEMI, started on hep gtt, developed bl RP bleed, acute anemia. sp MICU course for hemorrhagic shock, 10/28 sp IR embolization l4 and l5 lumbar artery. sp permacath and AVF.    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 related paranoia and agitation    # DM2 (diabetes mellitus, type 2)  per endo  hba1c 6.4    # CAD (coronary artery disease)  # HTN  lopressor, atorvastatin  asa on hold    # FTT  no improvement on remeron, cont to monitor, calorie count  son HCP is interested in artificial nutrition if necessary, GI consult  cont supplement shakes  palliative reconsult to address nutrition goc  ?PEG placement   rpt calorie count ordered ( previous one was incomplete)    # surveillance COVID  positive 12/29  monitor o2 sats- sating well on RA  will hold off on remedesivir or decadron for now    DVT ppx  venodyne  (Given increase risk factors, fall risk, extensive RP bleed, not a candidate for full AC for a.flutter.   However, may consider low dose Hep SQ BID for DVT ppx in the future if hgb remain stable)    PCP Dr. Simons    DNR/DNI    [facility]  [phone]

## 2022-01-03 NOTE — PROGRESS NOTE ADULT - SUBJECTIVE AND OBJECTIVE BOX
NEPHROLOGY-[facility] [phone]        Patient seen and examined in bed.  He was the same         MEDICATIONS  (STANDING):  atorvastatin 80 milliGRAM(s) Oral at bedtime  carbidopa/levodopa  25/100 2.5 Tablet(s) Oral <User Schedule>  carbidopa/levodopa  25/100 2 Tablet(s) Oral <User Schedule>  chlorhexidine 4% Liquid 1 Application(s) Topical <User Schedule>  cholecalciferol 1000 Unit(s) Oral daily  cinacalcet 60 milliGRAM(s) Oral daily  dextrose 40% Gel 15 Gram(s) Oral once  dextrose 5%. 1000 milliLiter(s) (50 mL/Hr) IV Continuous <Continuous>  dextrose 5%. 1000 milliLiter(s) (100 mL/Hr) IV Continuous <Continuous>  dextrose 50% Injectable 25 Gram(s) IV Push once  dextrose 50% Injectable 12.5 Gram(s) IV Push once  dextrose 50% Injectable 25 Gram(s) IV Push once  diVALproex Sprinkle 250 milliGRAM(s) Oral three times a day  DULoxetine 20 milliGRAM(s) Oral daily  folic acid 1 milliGRAM(s) Oral daily  glucagon  Injectable 1 milliGRAM(s) IntraMuscular once  heparin   Injectable 5000 Unit(s) SubCutaneous every 12 hours  insulin lispro (ADMELOG) corrective regimen sliding scale   SubCutaneous three times a day before meals  insulin lispro (ADMELOG) corrective regimen sliding scale   SubCutaneous at bedtime  levothyroxine 50 MICROGram(s) Oral daily  memantine 5 milliGRAM(s) Oral at bedtime  metoprolol tartrate 50 milliGRAM(s) Oral two times a day  midodrine 10 milliGRAM(s) Oral <User Schedule>  mirtazapine 7.5 milliGRAM(s) Oral daily  Nephro-celeste 1 Tablet(s) Oral daily  polyethylene glycol 3350 17 Gram(s) Oral two times a day  QUEtiapine 25 milliGRAM(s) Oral three times a day  senna Syrup 10 milliLiter(s) Oral at bedtime  trihexyphenidyl 2 milliGRAM(s) Oral three times a day      VITAL:  T(C): , Max: 36.8 (01-03-22 @ 04:28)  T(F): , Max: 98.2 (01-03-22 @ 04:28)  HR: 88 (01-03-22 @ 04:28)  BP: 107/65 (01-03-22 @ 04:28)  BP(mean): --  RR: 18 (01-03-22 @ 04:28)  SpO2: 97% (01-03-22 @ 04:28)  Wt(kg): --    I and O's:    01-02 @ 07:01  -  01-03 @ 07:00  --------------------------------------------------------  IN: 920 mL / OUT: 1300 mL / NET: -380 mL          PHYSICAL EXAM:    Constitutional: NAD  Neck:  No JVD  Respiratory: CTAB/L  Cardiovascular: S1 and S2  Gastrointestinal: BS+, soft, NT/ND  Extremities: No peripheral edema  Neurological: A/O x 3, no focal deficits  Psychiatric: Normal mood, normal affect  : No Javier  Skin: No rashes  Access: perm cath and avf     LABS:                        10.2   7.86  )-----------( 116      ( 03 Jan 2022 07:58 )             34.2     01-03    133<L>  |  95<L>  |  34<H>  ----------------------------<  94  3.9   |  25  |  3.94<H>    Ca    9.0      03 Jan 2022 07:53  Phos  5.7     01-02  Mg     2.2     01-02            Urine Studies:          RADIOLOGY & ADDITIONAL STUDIES:

## 2022-01-03 NOTE — PROGRESS NOTE ADULT - ASSESSMENT
ASSESSMENT/PLAN  72yo M w/ PMHx of CAD (s/p CABG 2019), CKD (unknown stage), DM2, Parkinson's Disease, HTN, depression presents with bilateral leg swelling  ESRD   Severe LV dysfunction; A flutter    + COVID      1 Palliation - Intermittent confusion ;  Can the seroquel be tapered....seems to be too lethargic   2 Renal-  HD in am with Retacrit   3 CVS- BP controlled at present, on Midodrine with parameters, Lopressor for heart rate control   4 Anemia - hgb improving  5 Vasc - s/p  LUE AVF--Immature (this will take weeks to months to mature) ;  If he stays longer then will try for BAM maturation via IR when he is out of covid isolation;  Perm cath   6 Endo - Off the lantus at present due to hypoglycemia   7 GI - on Remeron now  8 ID- Covid positive but asymptomatic       Sayed Rodriguez   [facility]   [phone]

## 2022-01-03 NOTE — PROGRESS NOTE ADULT - ASSESSMENT
Assessment  DMT2: 71y Male with DM T2 with hyperglycemia, A1C 6.4%, was on insulin at home, now on insulin coverage only, blood sugars are stable and trending within acceptable range, no hypoglycemias, breathing comfortable on room air, NAD, not eating much, wearing mittens for agitation.  Hypothyroidism: Patient has no history thyroid disease, was not on any thyroid supplements, subclinical with low-normal FT4, lethargic, started on synthroid 25 mcg po daily, repeat FT4 trending down, increased to synthroid  50 mcg po daily, FT4 improving.  Hypercalcemia: Started on Sensipar 60mg daily, Ca fluctuating/remains elevated..  CHF: on medications, stable, monitored.  HTN: Controlled,  on antihypertensive medications.  Parkinsons: on meds, monitored.  CKD: Monitor labs/BMP      Dr Ayers  Cell : [phone]

## 2022-01-03 NOTE — PROGRESS NOTE ADULT - SUBJECTIVE AND OBJECTIVE BOX
Patient is a 71y old  Male who presents with a chief complaint of leg swelling (03 Jan 2022 09:49)      INTERVAL HPI/OVERNIGHT EVENTS: noted  pt seen and examined this am   events noted-none  sating well on RA  mittens on      Vital Signs Last 24 Hrs  T(C): 36.5 (03 Jan 2022 10:51), Max: 36.8 (03 Jan 2022 04:28)  T(F): 97.7 (03 Jan 2022 10:51), Max: 98.2 (03 Jan 2022 04:28)  HR: 70 (03 Jan 2022 10:51) (70 - 117)  BP: 120/67 (03 Jan 2022 10:51) (107/65 - 120/67)  BP(mean): --  RR: 18 (03 Jan 2022 10:51) (18 - 18)  SpO2: 98% (03 Jan 2022 10:51) (96% - 99%)    acetaminophen     Tablet .. 650 milliGRAM(s) Oral every 6 hours PRN  albuterol/ipratropium for Nebulization 3 milliLiter(s) Nebulizer every 6 hours PRN  atorvastatin 80 milliGRAM(s) Oral at bedtime  carbidopa/levodopa  25/100 2.5 Tablet(s) Oral <User Schedule>  carbidopa/levodopa  25/100 2 Tablet(s) Oral <User Schedule>  chlorhexidine 4% Liquid 1 Application(s) Topical <User Schedule>  cholecalciferol 1000 Unit(s) Oral daily  cinacalcet 60 milliGRAM(s) Oral daily  dextrose 40% Gel 15 Gram(s) Oral once  dextrose 5%. 1000 milliLiter(s) IV Continuous <Continuous>  dextrose 5%. 1000 milliLiter(s) IV Continuous <Continuous>  dextrose 50% Injectable 25 Gram(s) IV Push once  dextrose 50% Injectable 12.5 Gram(s) IV Push once  dextrose 50% Injectable 25 Gram(s) IV Push once  diVALproex Sprinkle 125 milliGRAM(s) Oral every 8 hours PRN  diVALproex Sprinkle 250 milliGRAM(s) Oral three times a day  DULoxetine 20 milliGRAM(s) Oral daily  folic acid 1 milliGRAM(s) Oral daily  glucagon  Injectable 1 milliGRAM(s) IntraMuscular once  guaiFENesin Oral Liquid (Sugar-Free) 200 milliGRAM(s) Oral every 6 hours PRN  heparin   Injectable 5000 Unit(s) SubCutaneous every 12 hours  insulin lispro (ADMELOG) corrective regimen sliding scale   SubCutaneous three times a day before meals  insulin lispro (ADMELOG) corrective regimen sliding scale   SubCutaneous at bedtime  levothyroxine 50 MICROGram(s) Oral daily  memantine 5 milliGRAM(s) Oral at bedtime  metoprolol tartrate 50 milliGRAM(s) Oral two times a day  midodrine 10 milliGRAM(s) Oral <User Schedule>  mirtazapine 7.5 milliGRAM(s) Oral daily  Nephro-celeste 1 Tablet(s) Oral daily  ondansetron Injectable 4 milliGRAM(s) IV Push once PRN  polyethylene glycol 3350 17 Gram(s) Oral two times a day  QUEtiapine 25 milliGRAM(s) Oral three times a day  QUEtiapine 12.5 milliGRAM(s) Oral every 6 hours PRN  senna Syrup 10 milliLiter(s) Oral at bedtime  sodium chloride 0.9% lock flush 10 milliLiter(s) IV Push every 1 hour PRN  trihexyphenidyl 2 milliGRAM(s) Oral three times a day      PHYSICAL EXAM:  GENERAL: NAD,   EYES: conjunctiva and sclera clear  ENMT: Moist mucous membranes  NECK: Supple, No JVD, Normal thyroid  CHEST/LUNG: non labored, cta b/l  HEART: Regular rate and rhythm; No murmurs, rubs, or gallops  ABDOMEN: Soft, Nontender, Nondistended; Bowel sounds present  EXTREMITIES:  2+ Peripheral Pulses, No clubbing, cyanosis, or edema  LYMPH: No lymphadenopathy noted  SKIN: No rashes or lesions    Consultant(s) Notes Reviewed:  [x ] YES  [ ] NO  Care Discussed with Consultants/Other Providers [ x] YES  [ ] NO    LABS:                        10.2   7.86  )-----------( 116      ( 03 Jan 2022 07:58 )             34.2     01-03    133<L>  |  95<L>  |  34<H>  ----------------------------<  94  3.9   |  25  |  3.94<H>    Ca    9.0      03 Jan 2022 07:53  Phos  5.7     01-02  Mg     2.2     01-02          CAPILLARY BLOOD GLUCOSE      POCT Blood Glucose.: 96 mg/dL (03 Jan 2022 11:38)  POCT Blood Glucose.: 112 mg/dL (03 Jan 2022 07:56)  POCT Blood Glucose.: 95 mg/dL (02 Jan 2022 21:49)  POCT Blood Glucose.: 100 mg/dL (02 Jan 2022 17:50)              RADIOLOGY & ADDITIONAL TESTS:    Imaging Personally Reviewed:  [x ] YES  [ ] NO

## 2022-01-03 NOTE — PROGRESS NOTE ADULT - SUBJECTIVE AND OBJECTIVE BOX
Chief complaint  Patient is a 71y old  Male who presents with a chief complaint of leg swelling (03 Jan 2022 14:09)   Review of systems  Patient in bed, looks comfortable, no hypoglycemic episodes.    Labs and Fingersticks  CAPILLARY BLOOD GLUCOSE      POCT Blood Glucose.: 96 mg/dL (03 Jan 2022 11:38)  POCT Blood Glucose.: 112 mg/dL (03 Jan 2022 07:56)  POCT Blood Glucose.: 95 mg/dL (02 Jan 2022 21:49)  POCT Blood Glucose.: 100 mg/dL (02 Jan 2022 17:50)      Anion Gap, Serum: 13 (01-03 @ 07:53)  Anion Gap, Serum: 15 (01-02 @ 05:44)      Calcium, Total Serum: 9.0 (01-03 @ 07:53)  Calcium, Total Serum: 9.5 (01-02 @ 05:44)          01-03    133<L>  |  95<L>  |  34<H>  ----------------------------<  94  3.9   |  25  |  3.94<H>    Ca    9.0      03 Jan 2022 07:53  Phos  5.7     01-02  Mg     2.2     01-02                          10.2   7.86  )-----------( 116      ( 03 Jan 2022 07:58 )             34.2     Medications  MEDICATIONS  (STANDING):  atorvastatin 80 milliGRAM(s) Oral at bedtime  carbidopa/levodopa  25/100 2.5 Tablet(s) Oral <User Schedule>  carbidopa/levodopa  25/100 2 Tablet(s) Oral <User Schedule>  chlorhexidine 4% Liquid 1 Application(s) Topical <User Schedule>  cholecalciferol 1000 Unit(s) Oral daily  cinacalcet 60 milliGRAM(s) Oral daily  dextrose 40% Gel 15 Gram(s) Oral once  dextrose 5%. 1000 milliLiter(s) (50 mL/Hr) IV Continuous <Continuous>  dextrose 5%. 1000 milliLiter(s) (100 mL/Hr) IV Continuous <Continuous>  dextrose 50% Injectable 25 Gram(s) IV Push once  dextrose 50% Injectable 12.5 Gram(s) IV Push once  dextrose 50% Injectable 25 Gram(s) IV Push once  diVALproex Sprinkle 250 milliGRAM(s) Oral three times a day  DULoxetine 20 milliGRAM(s) Oral daily  folic acid 1 milliGRAM(s) Oral daily  glucagon  Injectable 1 milliGRAM(s) IntraMuscular once  heparin   Injectable 5000 Unit(s) SubCutaneous every 12 hours  insulin lispro (ADMELOG) corrective regimen sliding scale   SubCutaneous three times a day before meals  insulin lispro (ADMELOG) corrective regimen sliding scale   SubCutaneous at bedtime  levothyroxine 50 MICROGram(s) Oral daily  memantine 5 milliGRAM(s) Oral at bedtime  metoprolol tartrate 50 milliGRAM(s) Oral two times a day  midodrine 10 milliGRAM(s) Oral <User Schedule>  mirtazapine 7.5 milliGRAM(s) Oral daily  Nephro-celeste 1 Tablet(s) Oral daily  polyethylene glycol 3350 17 Gram(s) Oral two times a day  QUEtiapine 25 milliGRAM(s) Oral three times a day  senna Syrup 10 milliLiter(s) Oral at bedtime  trihexyphenidyl 2 milliGRAM(s) Oral three times a day      Physical Exam  General: Patient comfortable in bed  Vital Signs Last 12 Hrs  T(F): 97.7 (01-03-22 @ 10:51), Max: 98.2 (01-03-22 @ 04:28)  HR: 70 (01-03-22 @ 10:51) (70 - 88)  BP: 120/67 (01-03-22 @ 10:51) (107/65 - 120/67)  BP(mean): --  RR: 18 (01-03-22 @ 10:51) (18 - 18)  SpO2: 98% (01-03-22 @ 10:51) (97% - 98%)    Diagnostics    Free Thyroxine, Serum: AM Sched. Collection: 16-Dec-2021 06:00 (12-15 @ 14:39)  Free Thyroxine, Serum: AM Sched. Collection: 08-Dec-2021 06:00 (12-07 @ 14:26)

## 2022-01-04 NOTE — PROGRESS NOTE ADULT - SUBJECTIVE AND OBJECTIVE BOX
Patient is a 71y old  Male who presents with a chief complaint of leg swelling (04 Jan 2022 14:57)      INTERVAL HPI/OVERNIGHT EVENTS: noted  pt seen and examined this am   events noted  remains on mittens  per nursing staff pt w min po intake  calorie count will start 1/5-1/7      Vital Signs Last 24 Hrs  T(C): 36.4 (04 Jan 2022 20:34), Max: 36.4 (04 Jan 2022 11:06)  T(F): 97.5 (04 Jan 2022 20:34), Max: 97.6 (04 Jan 2022 11:06)  HR: 100 (04 Jan 2022 20:34) (77 - 103)  BP: 125/80 (04 Jan 2022 20:34) (101/56 - 125/80)  BP(mean): --  RR: 18 (04 Jan 2022 20:34) (18 - 20)  SpO2: 99% (04 Jan 2022 20:34) (90% - 99%)    acetaminophen     Tablet .. 650 milliGRAM(s) Oral every 6 hours PRN  albuterol/ipratropium for Nebulization 3 milliLiter(s) Nebulizer every 6 hours PRN  atorvastatin 80 milliGRAM(s) Oral at bedtime  carbidopa/levodopa  25/100 2.5 Tablet(s) Oral <User Schedule>  carbidopa/levodopa  25/100 2 Tablet(s) Oral <User Schedule>  chlorhexidine 4% Liquid 1 Application(s) Topical <User Schedule>  cholecalciferol 1000 Unit(s) Oral daily  cinacalcet 60 milliGRAM(s) Oral daily  dextrose 40% Gel 15 Gram(s) Oral once  dextrose 5%. 1000 milliLiter(s) IV Continuous <Continuous>  dextrose 5%. 1000 milliLiter(s) IV Continuous <Continuous>  dextrose 50% Injectable 25 Gram(s) IV Push once  dextrose 50% Injectable 12.5 Gram(s) IV Push once  dextrose 50% Injectable 25 Gram(s) IV Push once  diVALproex Sprinkle 125 milliGRAM(s) Oral every 8 hours PRN  diVALproex Sprinkle 250 milliGRAM(s) Oral three times a day  DULoxetine 20 milliGRAM(s) Oral daily  epoetin mari-epbx (RETACRIT) Injectable 4000 Unit(s) IV Push once  folic acid 1 milliGRAM(s) Oral daily  glucagon  Injectable 1 milliGRAM(s) IntraMuscular once  guaiFENesin Oral Liquid (Sugar-Free) 200 milliGRAM(s) Oral every 6 hours PRN  heparin   Injectable 5000 Unit(s) SubCutaneous every 12 hours  insulin lispro (ADMELOG) corrective regimen sliding scale   SubCutaneous three times a day before meals  insulin lispro (ADMELOG) corrective regimen sliding scale   SubCutaneous at bedtime  levothyroxine 50 MICROGram(s) Oral daily  memantine 5 milliGRAM(s) Oral at bedtime  metoprolol tartrate 50 milliGRAM(s) Oral two times a day  midodrine 10 milliGRAM(s) Oral <User Schedule>  mirtazapine 7.5 milliGRAM(s) Oral daily  Nephro-celeste 1 Tablet(s) Oral daily  ondansetron Injectable 4 milliGRAM(s) IV Push once PRN  polyethylene glycol 3350 17 Gram(s) Oral two times a day  QUEtiapine 25 milliGRAM(s) Oral three times a day  QUEtiapine 12.5 milliGRAM(s) Oral every 6 hours PRN  senna Syrup 10 milliLiter(s) Oral at bedtime  sodium chloride 0.9% lock flush 10 milliLiter(s) IV Push every 1 hour PRN  trihexyphenidyl 2 milliGRAM(s) Oral three times a day      PHYSICAL EXAM:  GENERAL: NAD,   EYES: conjunctiva and sclera clear  ENMT: Moist mucous membranes  NECK: Supple, No JVD, Normal thyroid  CHEST/LUNG: non labored, cta b/l  HEART: Regular rate and rhythm; No murmurs, rubs, or gallops  ABDOMEN: Soft, Nontender, Nondistended; Bowel sounds present  EXTREMITIES:  2+ Peripheral Pulses, No clubbing, cyanosis, or edema  LYMPH: No lymphadenopathy noted  SKIN: No rashes or lesions    Consultant(s) Notes Reviewed:  [x ] YES  [ ] NO  Care Discussed with Consultants/Other Providers [ x] YES  [ ] NO    LABS:                        10.0   7.67  )-----------( 130      ( 04 Jan 2022 06:16 )             33.8     01-04    139  |  100  |  51<H>  ----------------------------<  106<H>  4.4   |  25  |  5.24<H>    Ca    9.2      04 Jan 2022 06:16          CAPILLARY BLOOD GLUCOSE      POCT Blood Glucose.: 96 mg/dL (04 Jan 2022 16:57)  POCT Blood Glucose.: 99 mg/dL (04 Jan 2022 11:52)  POCT Blood Glucose.: 82 mg/dL (04 Jan 2022 08:08)  POCT Blood Glucose.: 98 mg/dL (03 Jan 2022 21:37)              RADIOLOGY & ADDITIONAL TESTS:    Imaging Personally Reviewed:  [x ] YES  [ ] NO

## 2022-01-04 NOTE — PROGRESS NOTE ADULT - ASSESSMENT
70yo M w/ PMHx of CAD (s/p CABG 2019), CKD (unknown stage), DM2, Parkinson's Disease, HTN, depression presents with new onset acute heart failure exacerbation, NSTEMI, started on hep gtt, developed bl RP bleed, acute anemia. sp MICU course for hemorrhagic shock, 10/28 sp IR embolization l4 and l5 lumbar artery. sp permacath and AVF.    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 related paranoia and agitation    # DM2 (diabetes mellitus, type 2)  per endo  hba1c 6.4    # CAD (coronary artery disease)  # HTN  lopressor, atorvastatin  asa on hold    # FTT  no improvement on remeron, cont to monitor,  son HCP is interested in artificial nutrition if necessary, GI consult  cont supplement shakes  palliative reconsult to address nutrition goc  ?PEG placement   rpt calorie count ordered ( previous one was incomplete)  calorie count to start 1/5-17    # surveillance COVID  positive 12/29  monitor o2 sats- sating well on RA  will hold off on remedesivir or decadron for now    DVT ppx  venodyne  (Given increase risk factors, fall risk, extensive RP bleed, not a candidate for full AC for a.flutter.   However, may consider low dose Hep SQ BID for DVT ppx in the future if hgb remain stable)    PCP Dr. Simons    DNR/DNI    [facility]  [phone]

## 2022-01-04 NOTE — PROGRESS NOTE ADULT - SUBJECTIVE AND OBJECTIVE BOX
Chief complaint  Patient is a 71y old  Male who presents with a chief complaint of leg swelling (04 Jan 2022 10:00)   Review of systems  Patient in bed, looks comfortable, no hypoglycemic episodes.    Labs and Fingersticks  CAPILLARY BLOOD GLUCOSE      POCT Blood Glucose.: 99 mg/dL (04 Jan 2022 11:52)  POCT Blood Glucose.: 82 mg/dL (04 Jan 2022 08:08)  POCT Blood Glucose.: 98 mg/dL (03 Jan 2022 21:37)  POCT Blood Glucose.: 105 mg/dL (03 Jan 2022 16:22)      Anion Gap, Serum: 14 (01-04 @ 06:16)  Anion Gap, Serum: 13 (01-03 @ 07:53)      Calcium, Total Serum: 9.2 (01-04 @ 06:16)  Calcium, Total Serum: 9.0 (01-03 @ 07:53)          01-04    139  |  100  |  51<H>  ----------------------------<  106<H>  4.4   |  25  |  5.24<H>    Ca    9.2      04 Jan 2022 06:16                          10.0   7.67  )-----------( 130      ( 04 Jan 2022 06:16 )             33.8     Medications  MEDICATIONS  (STANDING):  atorvastatin 80 milliGRAM(s) Oral at bedtime  carbidopa/levodopa  25/100 2.5 Tablet(s) Oral <User Schedule>  carbidopa/levodopa  25/100 2 Tablet(s) Oral <User Schedule>  chlorhexidine 4% Liquid 1 Application(s) Topical <User Schedule>  cholecalciferol 1000 Unit(s) Oral daily  cinacalcet 60 milliGRAM(s) Oral daily  dextrose 40% Gel 15 Gram(s) Oral once  dextrose 5%. 1000 milliLiter(s) (50 mL/Hr) IV Continuous <Continuous>  dextrose 5%. 1000 milliLiter(s) (100 mL/Hr) IV Continuous <Continuous>  dextrose 50% Injectable 25 Gram(s) IV Push once  dextrose 50% Injectable 12.5 Gram(s) IV Push once  dextrose 50% Injectable 25 Gram(s) IV Push once  diVALproex Sprinkle 250 milliGRAM(s) Oral three times a day  DULoxetine 20 milliGRAM(s) Oral daily  epoetin mari-epbx (RETACRIT) Injectable 4000 Unit(s) IV Push once  folic acid 1 milliGRAM(s) Oral daily  glucagon  Injectable 1 milliGRAM(s) IntraMuscular once  heparin   Injectable 5000 Unit(s) SubCutaneous every 12 hours  insulin lispro (ADMELOG) corrective regimen sliding scale   SubCutaneous three times a day before meals  insulin lispro (ADMELOG) corrective regimen sliding scale   SubCutaneous at bedtime  levothyroxine 50 MICROGram(s) Oral daily  memantine 5 milliGRAM(s) Oral at bedtime  metoprolol tartrate 50 milliGRAM(s) Oral two times a day  midodrine 10 milliGRAM(s) Oral <User Schedule>  mirtazapine 7.5 milliGRAM(s) Oral daily  Nephro-celeste 1 Tablet(s) Oral daily  polyethylene glycol 3350 17 Gram(s) Oral two times a day  QUEtiapine 25 milliGRAM(s) Oral three times a day  senna Syrup 10 milliLiter(s) Oral at bedtime  trihexyphenidyl 2 milliGRAM(s) Oral three times a day      Physical Exam  General: Patient comfortable in bed  Vital Signs Last 12 Hrs  T(F): 97.6 (01-04-22 @ 11:06), Max: 97.6 (01-04-22 @ 11:06)  HR: 90 (01-04-22 @ 11:06) (77 - 90)  BP: 117/71 (01-04-22 @ 11:06) (101/56 - 117/71)  BP(mean): --  RR: 20 (01-04-22 @ 11:06) (19 - 20)  SpO2: 96% (01-04-22 @ 11:06) (90% - 96%)    Diagnostics    Free Thyroxine, Serum: AM Sched. Collection: 16-Dec-2021 06:00 (12-15 @ 14:39)  Free Thyroxine, Serum: AM Sched. Collection: 08-Dec-2021 06:00 (12-07 @ 14:26)

## 2022-01-04 NOTE — PROGRESS NOTE ADULT - SUBJECTIVE AND OBJECTIVE BOX
NEPHROLOGY-[facility] [phone]        Patient seen and examined in bed.  He was the same         MEDICATIONS  (STANDING):  atorvastatin 80 milliGRAM(s) Oral at bedtime  carbidopa/levodopa  25/100 2.5 Tablet(s) Oral <User Schedule>  carbidopa/levodopa  25/100 2 Tablet(s) Oral <User Schedule>  chlorhexidine 4% Liquid 1 Application(s) Topical <User Schedule>  cholecalciferol 1000 Unit(s) Oral daily  cinacalcet 60 milliGRAM(s) Oral daily  dextrose 40% Gel 15 Gram(s) Oral once  dextrose 5%. 1000 milliLiter(s) (50 mL/Hr) IV Continuous <Continuous>  dextrose 5%. 1000 milliLiter(s) (100 mL/Hr) IV Continuous <Continuous>  dextrose 50% Injectable 25 Gram(s) IV Push once  dextrose 50% Injectable 12.5 Gram(s) IV Push once  dextrose 50% Injectable 25 Gram(s) IV Push once  diVALproex Sprinkle 250 milliGRAM(s) Oral three times a day  DULoxetine 20 milliGRAM(s) Oral daily  epoetin mari-epbx (RETACRIT) Injectable 4000 Unit(s) IV Push once  folic acid 1 milliGRAM(s) Oral daily  glucagon  Injectable 1 milliGRAM(s) IntraMuscular once  heparin   Injectable 5000 Unit(s) SubCutaneous every 12 hours  insulin lispro (ADMELOG) corrective regimen sliding scale   SubCutaneous three times a day before meals  insulin lispro (ADMELOG) corrective regimen sliding scale   SubCutaneous at bedtime  levothyroxine 50 MICROGram(s) Oral daily  memantine 5 milliGRAM(s) Oral at bedtime  metoprolol tartrate 50 milliGRAM(s) Oral two times a day  midodrine 10 milliGRAM(s) Oral <User Schedule>  mirtazapine 7.5 milliGRAM(s) Oral daily  Nephro-celeste 1 Tablet(s) Oral daily  polyethylene glycol 3350 17 Gram(s) Oral two times a day  QUEtiapine 25 milliGRAM(s) Oral three times a day  senna Syrup 10 milliLiter(s) Oral at bedtime  trihexyphenidyl 2 milliGRAM(s) Oral three times a day      VITAL:  T(C): , Max: 36.5 (01-03-22 @ 10:51)  T(F): , Max: 97.7 (01-03-22 @ 10:51)  HR: 77 (01-04-22 @ 04:15)  BP: 101/56 (01-04-22 @ 04:15)  BP(mean): --  RR: 19 (01-04-22 @ 04:15)  SpO2: 90% (01-04-22 @ 04:15)  Wt(kg): --    I and O's:    01-03 @ 07:01  -  01-04 @ 07:00  --------------------------------------------------------  IN: 0 mL / OUT: 0 mL / NET: 0 mL          PHYSICAL EXAM:    Constitutional: NAD  Neck:  No JVD  Respiratory: CTAB/L  Cardiovascular: S1 and S2  Gastrointestinal: BS+, soft, NT/ND  Extremities: No peripheral edema  Neurological:   Psychiatric: Normal mood, normal affect  : No Javier  Skin: No rashes  Access: avf and perm cath     LABS:                        10.0   7.67  )-----------( 130      ( 04 Jan 2022 06:16 )             33.8     01-04    139  |  100  |  51<H>  ----------------------------<  106<H>  4.4   |  25  |  5.24<H>    Ca    9.2      04 Jan 2022 06:16            Urine Studies:          RADIOLOGY & ADDITIONAL STUDIES:

## 2022-01-04 NOTE — PROGRESS NOTE ADULT - ASSESSMENT
Assessment  DMT2: 71y Male with DM T2 with hyperglycemia, A1C 6.4%, was on insulin at home, now on insulin coverage only, blood sugars are stable and trending within acceptable range, no hypoglycemias, breathing comfortable on room air, NAD, not eating much + some sips of Nepro, eating <50% of meals per RD eval.  Hypothyroidism: Patient has no history thyroid disease, was not on any thyroid supplements, subclinical with low-normal FT4, lethargic, started on synthroid 25 mcg po daily, repeat FT4 trending down, increased to synthroid  50 mcg po daily, FT4 improving.  Hypercalcemia: Started on Sensipar 60mg daily, Ca fluctuating/remains elevated..  CHF: on medications, stable, monitored.  HTN: Controlled,  on antihypertensive medications.  Parkinsons: on meds, monitored.  CKD: Monitor labs/BMP      Dr Ayers  Cell : [phone]

## 2022-01-04 NOTE — PROGRESS NOTE ADULT - ASSESSMENT
ASSESSMENT/PLAN  72yo M w/ PMHx of CAD (s/p CABG 2019), CKD (unknown stage), DM2, Parkinson's Disease, HTN, depression presents with bilateral leg swelling  ESRD   Severe LV dysfunction; A flutter    + COVID      1 Palliation - Intermittent confusion ;  Can the seroquel be tapered....seems to be too lethargic   2 Renal-  HD in am with Retacrit   3 CVS- BP controlled at present, on Midodrine with parameters, Lopressor for heart rate control   4 Anemia - hgb improving  5 Vasc - s/p  LUE AVF--Immature (this will take weeks to months to mature) ;  If he stays longer then will try for BAM maturation via IR when he is out of covid isolation;  Perm cath   6 Endo - Off the lantus at present due to hypoglycemia   7 GI - on Remeron now but GI consult for possible peg.  Would reducing the seroquel help him be more active   8 ID- Covid positive but asymptomatic       [facility]   [phone]

## 2022-01-04 NOTE — CHART NOTE - NSCHARTNOTEFT_GEN_A_CORE
Nutrition Follow Up Note  Patient seen for: malnutrition follow-up + calorie count. Chart reviewed, events noted.     "70yo M w/ PMHx of CAD (s/p CABG ), CKD (unknown stage), DM2, Parkinson's Disease, HTN, depression presents with new onset acute heart failure exacerbation, NSTEMI, started on hep gtt, developed bl RP bleed, acute anemia. sp MICU course for hemorrhagic shock, 10/28 sp IR embolization l4 and l5 lumbar artery. sp permacath and AVF."    Source: [] Patient       [x] Medical Record        [x] RN        [] Family at bedside       [] Other:    -If unable to interview patient: [] Trach/Vent/BiPAP  [] Disoriented/confused/inappropriate to interview    Diet Order:   Diet, Regular:   Supplement Feeding Modality:  Oral  Nepro Cans or Servings Per Day:  1       Frequency:  Daily (21)    - Is current order appropriate/adequate? [x] Yes  []  No:     - PO intake :   [] >75%  Adequate    [] 50-75%  Fair       [x] <50%  Poor    - Nutrition-related concerns:      - Per RN and per flowsheets, pt with minimal PO intake. Takes sips of Nepro.      -       -      -  GI:  Last BM .   Bowel Regimen? [] Yes   [] No      Weights:   Daily Weight in k.6 (12-30), Weight in k.1 (12-30)    Nutritionally Pertinent MEDICATIONS  (STANDING):  atorvastatin  cholecalciferol  cinacalcet  dextrose 40% Gel  dextrose 5%.  dextrose 5%.  dextrose 50% Injectable  dextrose 50% Injectable  dextrose 50% Injectable  folic acid  glucagon  Injectable  insulin lispro (ADMELOG) corrective regimen sliding scale  insulin lispro (ADMELOG) corrective regimen sliding scale  levothyroxine  metoprolol tartrate  midodrine  Nephro-celeste  polyethylene glycol 3350  senna Syrup    Pertinent Labs:  @ 06:16: Na 139, BUN 51<H>, Cr 5.24<H>, <H>, K+ 4.4, Phos --, Mg --, Alk Phos --, ALT/SGPT --, AST/SGOT --, HbA1c --    A1C with Estimated Average Glucose Result: 6.4 % (10-22-21 @ 08:56)    Finger Sticks:  POCT Blood Glucose.: 99 mg/dL ( @ 11:52)  POCT Blood Glucose.: 82 mg/dL ( @ 08:08)  POCT Blood Glucose.: 98 mg/dL ( @ 21:37)  POCT Blood Glucose.: 105 mg/dL ( @ 16:22)      Skin per nursing documentation: No pressure injuries noted.  Edema per nursing documentation:     Estimated Needs:   [] no change since previous assessment  [] recalculated:     Previous Nutrition Diagnosis:   Nutrition Diagnosis is: [] ongoing  [] resolved [] not applicable     Nutrition Care Plan:  [] In Progress  [] Achieved  [] Not applicable    New Nutrition Diagnosis: [] Not applicable    Nutrition Interventions:     Education Provided   [] Yes:  [] No:     Recommendations:          Monitoring and Evaluation:   Continue to monitor nutritional intake, tolerance to diet prescription, weights, labs, skin integrity    RD remains available upon request and will follow up per protocol Nutrition Follow Up Note  Patient seen for: malnutrition follow-up + calorie count. Chart reviewed, events noted.     "72yo M w/ PMHx of CAD (s/p CABG ), CKD (unknown stage), DM2, Parkinson's Disease, HTN, depression presents with new onset acute heart failure exacerbation, NSTEMI, started on hep gtt, developed bl RP bleed, acute anemia. sp MICU course for hemorrhagic shock, 10/28 sp IR embolization l4 and l5 lumbar artery. sp permacath and AVF."    Source: [] Patient       [x] Medical Record        [x] RN        [] Family at bedside       [] Other:    -If unable to interview patient: [] Trach/Vent/BiPAP  [] Disoriented/confused/inappropriate to interview    Diet Order:   Diet, Regular:   Supplement Feeding Modality:  Oral  Nepro Cans or Servings Per Day:  1       Frequency:  Daily (-)    - Is current order appropriate/adequate? [x] Yes  []  No:     - PO intake :   [] >75%  Adequate    [] 50-75%  Fair       [x] <50%  Poor    - Nutrition-related concerns:      - Pt now COVID+ but asymptomatic per chart.      - Per RN and per flowsheets, pt with minimal PO intake. Takes sips of Nepro.      - Calorie Count initiated by RD; to be conducted -. RN informed, sign posted. RD to follow up with results upon completion.       - Per chart, pt ordered for Nephro-celeste, Vitamin D3, folic acid, sinemet, and SSI Admelog.        GI: Denies nausea, vomiting, constipation, diarrhea.  Last BM  per chart.   Bowel Regimen? [x] Yes (miralax, senna)  [] No      Weights:   Daily Weight in k.6 (12-30), 67.1 (12-30), 69.3 (12-28),  69.8 (12-28),  68.6 (12-26),  68.6 (12-26),  70.4 (12-24),  71 (12-23),  71 (12-23)  Dosing wt: 96.4 kg (12-14)  Noted with progressive wt decline, likely 2/2 poor PO intake. RD to continue to monitor weight trends as able/available.     MEDICATIONS  (STANDING):  atorvastatin 80 milliGRAM(s) Oral at bedtime  carbidopa/levodopa  25/100 2.5 Tablet(s) Oral <User Schedule>  carbidopa/levodopa  25/100 2 Tablet(s) Oral <User Schedule>  chlorhexidine 4% Liquid 1 Application(s) Topical <User Schedule>  cholecalciferol 1000 Unit(s) Oral daily  cinacalcet 60 milliGRAM(s) Oral daily  dextrose 40% Gel 15 Gram(s) Oral once  dextrose 5%. 1000 milliLiter(s) (50 mL/Hr) IV Continuous <Continuous>  dextrose 5%. 1000 milliLiter(s) (100 mL/Hr) IV Continuous <Continuous>  dextrose 50% Injectable 25 Gram(s) IV Push once  dextrose 50% Injectable 12.5 Gram(s) IV Push once  dextrose 50% Injectable 25 Gram(s) IV Push once  diVALproex Sprinkle 250 milliGRAM(s) Oral three times a day  DULoxetine 20 milliGRAM(s) Oral daily  epoetin mari-epbx (RETACRIT) Injectable 4000 Unit(s) IV Push once  folic acid 1 milliGRAM(s) Oral daily  glucagon  Injectable 1 milliGRAM(s) IntraMuscular once  heparin   Injectable 5000 Unit(s) SubCutaneous every 12 hours  insulin lispro (ADMELOG) corrective regimen sliding scale   SubCutaneous three times a day before meals  insulin lispro (ADMELOG) corrective regimen sliding scale   SubCutaneous at bedtime  levothyroxine 50 MICROGram(s) Oral daily  memantine 5 milliGRAM(s) Oral at bedtime  metoprolol tartrate 50 milliGRAM(s) Oral two times a day  midodrine 10 milliGRAM(s) Oral <User Schedule>  mirtazapine 7.5 milliGRAM(s) Oral daily  Nephro-celeste 1 Tablet(s) Oral daily  polyethylene glycol 3350 17 Gram(s) Oral two times a day  QUEtiapine 25 milliGRAM(s) Oral three times a day  senna Syrup 10 milliLiter(s) Oral at bedtime  trihexyphenidyl 2 milliGRAM(s) Oral three times a day    MEDICATIONS  (PRN):  acetaminophen     Tablet .. 650 milliGRAM(s) Oral every 6 hours PRN Mild Pain (1 - 3)  albuterol/ipratropium for Nebulization 3 milliLiter(s) Nebulizer every 6 hours PRN Shortness of Breath and/or Wheezing  diVALproex Sprinkle 125 milliGRAM(s) Oral every 8 hours PRN Agitation  guaiFENesin Oral Liquid (Sugar-Free) 200 milliGRAM(s) Oral every 6 hours PRN Cough  ondansetron Injectable 4 milliGRAM(s) IV Push once PRN Nausea and/or Vomiting  QUEtiapine 12.5 milliGRAM(s) Oral every 6 hours PRN agitation  sodium chloride 0.9% lock flush 10 milliLiter(s) IV Push every 1 hour PRN Pre/post blood products, medications, blood draw, and to maintain line patency    Pertinent Labs:    @ 06:16: Na 139, BUN 51<H>, Cr 5.24<H>, <H>, K+ 4.4   @ 05:44 Phos 5.7 <H>     A1C with Estimated Average Glucose Result: 6.4 % (10-22-21 @ 08:56)    Finger Sticks:  POCT Blood Glucose.: 99 mg/dL ( @ 11:52)  POCT Blood Glucose.: 82 mg/dL ( @ 08:08)  POCT Blood Glucose.: 98 mg/dL ( @ 21:37)  POCT Blood Glucose.: 105 mg/dL ( @ 16:22)      Skin per nursing documentation: No pressure injuries noted.  Edema per nursing documentation: none noted    Estimated Needs:   Estimated Needs:   [x] no change since previous assessment  Estimated Energy Needs: 7617-0404 kcal/day (30-35 kcal/kg)  Estimated Protein Needs:  gm protein/day (1.2-1.4 g/kg)  Defer fluid needs to team  based on IBW 78 kg    Previous Nutrition Diagnosis: Increased Nutrient Needs;  Food and Nutrition Related Knowledge Deficit;  Severe acute on chronic malnutrition  Nutrition Diagnosis is: [x] ongoing  [] resolved [] not applicable   Being addressed with liberalized PO diet, micronutrient supplementation, and oral nutrition supplements     Nutrition Care Plan:  [x] In Progress  [] Achieved  [] Not applicable    New Nutrition Diagnosis: [x] Not applicable    Nutrition Interventions:     Education Provided   [] Yes:  [x] No: pt with AMS     Recommendations:      1) Continue Regular diet with Nepro 1x/day. Would recommend addition of Ensure Pudding 3x/day.  2) Would recommend considering alternative means to improve appetite and/or alternative means of nutrition if within GOC. Nutrition plan of care to be in line with medical GOC and pt/family wishes.   3) Continue Nephro-celeste, Vitamin D, and Folic acid if no medical contraindications as medically appropriate.  4) RD to follow up with results of Calorie Count when complete.  5) Monitor PO intake, GI tolerance, skin integrity, labs, weight.  6) malnutrition alert in chart     Monitoring and Evaluation:   Continue to monitor nutritional intake, tolerance to diet prescription, weights, labs, skin integrity    RD remains available upon request and will follow up per protocol  Maribel Gifford, MEHDI, CDN Pager #[phone]

## 2022-01-05 NOTE — PROGRESS NOTE ADULT - SUBJECTIVE AND OBJECTIVE BOX
Chief complaint  Patient is a 71y old  Male who presents with a chief complaint of leg swelling (05 Jan 2022 10:58)   Review of systems  Patient in bed, looks comfortable, no hypoglycemic episodes.    Labs and Fingersticks  CAPILLARY BLOOD GLUCOSE      POCT Blood Glucose.: 104 mg/dL (05 Jan 2022 11:31)  POCT Blood Glucose.: 84 mg/dL (05 Jan 2022 07:43)  POCT Blood Glucose.: 80 mg/dL (04 Jan 2022 21:05)  POCT Blood Glucose.: 96 mg/dL (04 Jan 2022 16:57)      Anion Gap, Serum: 14 (01-04 @ 06:16)      Calcium, Total Serum: 9.2 (01-04 @ 06:16)          01-04    139  |  100  |  51<H>  ----------------------------<  106<H>  4.4   |  25  |  5.24<H>    Ca    9.2      04 Jan 2022 06:16                          10.0   7.67  )-----------( 130      ( 04 Jan 2022 06:16 )             33.8     Medications  MEDICATIONS  (STANDING):  atorvastatin 80 milliGRAM(s) Oral at bedtime  carbidopa/levodopa  25/100 2.5 Tablet(s) Oral <User Schedule>  carbidopa/levodopa  25/100 2 Tablet(s) Oral <User Schedule>  chlorhexidine 4% Liquid 1 Application(s) Topical <User Schedule>  cholecalciferol 1000 Unit(s) Oral daily  cinacalcet 60 milliGRAM(s) Oral daily  dextrose 40% Gel 15 Gram(s) Oral once  dextrose 5%. 1000 milliLiter(s) (50 mL/Hr) IV Continuous <Continuous>  dextrose 5%. 1000 milliLiter(s) (100 mL/Hr) IV Continuous <Continuous>  dextrose 50% Injectable 25 Gram(s) IV Push once  dextrose 50% Injectable 12.5 Gram(s) IV Push once  dextrose 50% Injectable 25 Gram(s) IV Push once  diVALproex Sprinkle 250 milliGRAM(s) Oral three times a day  DULoxetine 20 milliGRAM(s) Oral daily  epoetin mari-epbx (RETACRIT) Injectable 4000 Unit(s) IV Push once  folic acid 1 milliGRAM(s) Oral daily  glucagon  Injectable 1 milliGRAM(s) IntraMuscular once  heparin   Injectable 5000 Unit(s) SubCutaneous every 12 hours  insulin lispro (ADMELOG) corrective regimen sliding scale   SubCutaneous three times a day before meals  insulin lispro (ADMELOG) corrective regimen sliding scale   SubCutaneous at bedtime  levothyroxine 50 MICROGram(s) Oral daily  memantine 5 milliGRAM(s) Oral at bedtime  metoprolol tartrate 50 milliGRAM(s) Oral two times a day  midodrine 10 milliGRAM(s) Oral <User Schedule>  mirtazapine 7.5 milliGRAM(s) Oral daily  Nephro-celeste 1 Tablet(s) Oral daily  polyethylene glycol 3350 17 Gram(s) Oral two times a day  QUEtiapine 25 milliGRAM(s) Oral two times a day  senna Syrup 10 milliLiter(s) Oral at bedtime  trihexyphenidyl 2 milliGRAM(s) Oral three times a day      Physical Exam  General: Patient comfortable in bed  Vital Signs Last 12 Hrs  T(F): 97.5 (01-05-22 @ 11:38), Max: 97.7 (01-05-22 @ 04:59)  HR: 115 (01-05-22 @ 11:38) (88 - 115)  BP: 126/74 (01-05-22 @ 11:38) (114/76 - 126/74)  BP(mean): --  RR: 18 (01-05-22 @ 11:38) (18 - 18)  SpO2: 96% (01-05-22 @ 11:38) (96% - 98%)  Neck: No palpable thyroid nodules.  CVS: S1S2, No murmurs  Respiratory: No wheezing, no crepitations  GI: Abdomen soft, bowel sounds positive  Musculoskeletal:  edema lower extremities.   Skin: No skin rashes, no ecchymosis    Diagnostics    Free Thyroxine, Serum: AM Sched. Collection: 16-Dec-2021 06:00 (12-15 @ 14:39)  Free Thyroxine, Serum: AM Sched. Collection: 08-Dec-2021 06:00 (12-07 @ 14:26)

## 2022-01-05 NOTE — PROGRESS NOTE ADULT - SUBJECTIVE AND OBJECTIVE BOX
Patient is a 71y old  Male who presents with a chief complaint of leg swelling (05 Jan 2022 14:52)      SUBJECTIVE / OVERNIGHT EVENTS:    Patient seen and examined.   calm. states he is not hungry.    Vital Signs Last 24 Hrs  T(C): 36.4 (05 Jan 2022 11:38), Max: 36.5 (05 Jan 2022 04:59)  T(F): 97.5 (05 Jan 2022 11:38), Max: 97.7 (05 Jan 2022 04:59)  HR: 115 (05 Jan 2022 11:38) (88 - 117)  BP: 126/74 (05 Jan 2022 11:38) (105/60 - 126/74)  BP(mean): --  RR: 18 (05 Jan 2022 11:38) (18 - 20)  SpO2: 96% (05 Jan 2022 11:38) (96% - 99%)  I&O's Summary    04 Jan 2022 07:01  -  05 Jan 2022 07:00  --------------------------------------------------------  IN: 0 mL / OUT: 0 mL / NET: 0 mL    05 Jan 2022 07:01  -  05 Jan 2022 15:35  --------------------------------------------------------  IN: 60 mL / OUT: 0 mL / NET: 60 mL        PE:  GENERAL: NAD, AAOx1-2, frail  HEAD:  Atraumatic, Normocephalic  EYES: EOMI, PERRLA, conjunctiva and sclera clear  NECK: No JVD  CHEST/LUNG: CTABL, No wheeze, right permacath  HEART: Regular rate and rhythm; no murmur  ABDOMEN: Soft, Nontender, Nondistended; Bowel sounds present  EXTREMITIES:  2+ Peripheral Pulses, No clubbing, cyanosis, or edema  SKIN: left forearm avf  NEURO: No focal deficits    LABS:                        10.0   7.67  )-----------( 130      ( 04 Jan 2022 06:16 )             33.8     01-04    139  |  100  |  51<H>  ----------------------------<  106<H>  4.4   |  25  |  5.24<H>    Ca    9.2      04 Jan 2022 06:16        CAPILLARY BLOOD GLUCOSE      POCT Blood Glucose.: 104 mg/dL (05 Jan 2022 11:31)  POCT Blood Glucose.: 84 mg/dL (05 Jan 2022 07:43)  POCT Blood Glucose.: 80 mg/dL (04 Jan 2022 21:05)  POCT Blood Glucose.: 96 mg/dL (04 Jan 2022 16:57)            RADIOLOGY & ADDITIONAL TESTS:    Imaging Personally Reviewed:  [x] YES  [ ] NO    Consultant(s) Notes Reviewed:  [x] YES  [ ] NO    MEDICATIONS  (STANDING):  atorvastatin 80 milliGRAM(s) Oral at bedtime  carbidopa/levodopa  25/100 2.5 Tablet(s) Oral <User Schedule>  carbidopa/levodopa  25/100 2 Tablet(s) Oral <User Schedule>  chlorhexidine 4% Liquid 1 Application(s) Topical <User Schedule>  cholecalciferol 1000 Unit(s) Oral daily  cinacalcet 60 milliGRAM(s) Oral daily  dextrose 40% Gel 15 Gram(s) Oral once  dextrose 5%. 1000 milliLiter(s) (50 mL/Hr) IV Continuous <Continuous>  dextrose 5%. 1000 milliLiter(s) (100 mL/Hr) IV Continuous <Continuous>  dextrose 50% Injectable 25 Gram(s) IV Push once  dextrose 50% Injectable 12.5 Gram(s) IV Push once  dextrose 50% Injectable 25 Gram(s) IV Push once  diVALproex Sprinkle 250 milliGRAM(s) Oral three times a day  DULoxetine 20 milliGRAM(s) Oral daily  epoetin mari-epbx (RETACRIT) Injectable 4000 Unit(s) IV Push once  folic acid 1 milliGRAM(s) Oral daily  glucagon  Injectable 1 milliGRAM(s) IntraMuscular once  heparin   Injectable 5000 Unit(s) SubCutaneous every 12 hours  insulin lispro (ADMELOG) corrective regimen sliding scale   SubCutaneous three times a day before meals  insulin lispro (ADMELOG) corrective regimen sliding scale   SubCutaneous at bedtime  levothyroxine 50 MICROGram(s) Oral daily  memantine 5 milliGRAM(s) Oral at bedtime  metoprolol tartrate 50 milliGRAM(s) Oral two times a day  midodrine 10 milliGRAM(s) Oral <User Schedule>  mirtazapine 7.5 milliGRAM(s) Oral daily  Nephro-celeste 1 Tablet(s) Oral daily  polyethylene glycol 3350 17 Gram(s) Oral two times a day  QUEtiapine 25 milliGRAM(s) Oral two times a day  senna Syrup 10 milliLiter(s) Oral at bedtime  trihexyphenidyl 2 milliGRAM(s) Oral three times a day    MEDICATIONS  (PRN):  acetaminophen     Tablet .. 650 milliGRAM(s) Oral every 6 hours PRN Mild Pain (1 - 3)  albuterol/ipratropium for Nebulization 3 milliLiter(s) Nebulizer every 6 hours PRN Shortness of Breath and/or Wheezing  diVALproex Sprinkle 125 milliGRAM(s) Oral every 8 hours PRN Agitation  guaiFENesin Oral Liquid (Sugar-Free) 200 milliGRAM(s) Oral every 6 hours PRN Cough  ondansetron Injectable 4 milliGRAM(s) IV Push once PRN Nausea and/or Vomiting  QUEtiapine 12.5 milliGRAM(s) Oral every 6 hours PRN agitation  sodium chloride 0.9% lock flush 10 milliLiter(s) IV Push every 1 hour PRN Pre/post blood products, medications, blood draw, and to maintain line patency      Care Discussed with Consultants/Other Providers [x] YES  [ ] NO    HEALTH ISSUES - PROBLEM Dx:  Acute heart failure    Atrial flutter    DM2 (diabetes mellitus, type 2)    CAD (coronary artery disease)    Parkinsons disease    Acute on chronic renal failure    NSTEMI (non-ST elevation myocardial infarction)    Hypertension    Acute kidney injury superimposed on CKD    Anemia    Hypothyroidism    Delirium    Advanced care planning/counseling discussion    Palliative care status    Palliative care encounter    Pain    Stage 5 chronic kidney disease not on chronic dialysis    Hypercalcemia

## 2022-01-05 NOTE — PROGRESS NOTE ADULT - SUBJECTIVE AND OBJECTIVE BOX
NEPHROLOGY-[facility] [phone]        Patient seen and examined in bed.  He was the same         MEDICATIONS  (STANDING):  atorvastatin 80 milliGRAM(s) Oral at bedtime  carbidopa/levodopa  25/100 2.5 Tablet(s) Oral <User Schedule>  carbidopa/levodopa  25/100 2 Tablet(s) Oral <User Schedule>  chlorhexidine 4% Liquid 1 Application(s) Topical <User Schedule>  cholecalciferol 1000 Unit(s) Oral daily  cinacalcet 60 milliGRAM(s) Oral daily  dextrose 40% Gel 15 Gram(s) Oral once  dextrose 5%. 1000 milliLiter(s) (50 mL/Hr) IV Continuous <Continuous>  dextrose 5%. 1000 milliLiter(s) (100 mL/Hr) IV Continuous <Continuous>  dextrose 50% Injectable 25 Gram(s) IV Push once  dextrose 50% Injectable 12.5 Gram(s) IV Push once  dextrose 50% Injectable 25 Gram(s) IV Push once  diVALproex Sprinkle 250 milliGRAM(s) Oral three times a day  DULoxetine 20 milliGRAM(s) Oral daily  epoetin mari-epbx (RETACRIT) Injectable 4000 Unit(s) IV Push once  folic acid 1 milliGRAM(s) Oral daily  glucagon  Injectable 1 milliGRAM(s) IntraMuscular once  heparin   Injectable 5000 Unit(s) SubCutaneous every 12 hours  insulin lispro (ADMELOG) corrective regimen sliding scale   SubCutaneous three times a day before meals  insulin lispro (ADMELOG) corrective regimen sliding scale   SubCutaneous at bedtime  levothyroxine 50 MICROGram(s) Oral daily  memantine 5 milliGRAM(s) Oral at bedtime  metoprolol tartrate 50 milliGRAM(s) Oral two times a day  midodrine 10 milliGRAM(s) Oral <User Schedule>  mirtazapine 7.5 milliGRAM(s) Oral daily  Nephro-celeste 1 Tablet(s) Oral daily  polyethylene glycol 3350 17 Gram(s) Oral two times a day  QUEtiapine 25 milliGRAM(s) Oral three times a day  senna Syrup 10 milliLiter(s) Oral at bedtime  trihexyphenidyl 2 milliGRAM(s) Oral three times a day      VITAL:  T(C): , Max: 36.5 (01-05-22 @ 04:59)  T(F): , Max: 97.7 (01-05-22 @ 04:59)  HR: 99 (01-05-22 @ 04:59)  BP: 114/76 (01-05-22 @ 04:59)  BP(mean): --  RR: 18 (01-05-22 @ 04:59)  SpO2: 98% (01-05-22 @ 04:59)  Wt(kg): --    I and O's:    01-04 @ 07:01  -  01-05 @ 07:00  --------------------------------------------------------  IN: 0 mL / OUT: 0 mL / NET: 0 mL    01-05 @ 07:01  -  01-05 @ 10:58  --------------------------------------------------------  IN: 30 mL / OUT: 0 mL / NET: 30 mL          PHYSICAL EXAM:    Constitutional: NAD  Neck:  No JVD  Respiratory: CTAB/L  Cardiovascular: S1 and S2  Gastrointestinal: BS+, soft, NT/ND  Extremities: No peripheral edema  Neurological: A/O x 3, no focal deficits  Psychiatric: Normal mood, normal affect  : No Javier  Skin: No rashes  Access: avf and perm cath     LABS:                        10.0   7.67  )-----------( 130      ( 04 Jan 2022 06:16 )             33.8     01-04    139  |  100  |  51<H>  ----------------------------<  106<H>  4.4   |  25  |  5.24<H>    Ca    9.2      04 Jan 2022 06:16            Urine Studies:          RADIOLOGY & ADDITIONAL STUDIES:

## 2022-01-05 NOTE — PROGRESS NOTE ADULT - ASSESSMENT
ASSESSMENT/PLAN  72yo M w/ PMHx of CAD (s/p CABG 2019), CKD (unknown stage), DM2, Parkinson's Disease, HTN, depression presents with bilateral leg swelling  ESRD   Severe LV dysfunction; A flutter    + COVID      1 Palliation - Intermittent confusion ;  Can the seroquel be tapered....seems to be too lethargic and this may be contributing to him not eating   2 Renal-  HD in am with Retacrit.  Yesterday was done without fluid removal  3 CVS- BP controlled at present, on Midodrine with parameters, Lopressor for heart rate control   4 Anemia - hgb improving  5 Vasc - s/p  LUE AVF--Immature (this will take weeks to months to mature) ;  If he stays longer then will try for BAM maturation via IR when he is out of covid isolation;  Perm cath   6 Endo - Off the lantus at present due to hypoglycemia   7 GI - on Remeron now but GI consult for possible peg.  Would reducing the seroquel help him be more active   8 ID- Covid positive but asymptomatic     Call placed to PMD     [facility]   [phone]

## 2022-01-05 NOTE — PROGRESS NOTE ADULT - ASSESSMENT
72yo M w/ PMHx of CAD (s/p CABG 2019), CKD (unknown stage), DM2, Parkinson's Disease, HTN, depression presents with new onset acute heart failure exacerbation, NSTEMI, started on hep gtt, developed bl RP bleed, acute anemia. sp MICU course for hemorrhagic shock, 10/28 sp IR embolization l4 and l5 lumbar artery. sp permacath and AVF.    # Acute on chronic systolic and diastolic heart failure  # NSTEMI, medical management  # ESRD, new HD  # Atrial flutter  # acute hypoxic resp failure  # hemorrhagic shock, left RP hematoma, acute blood loss anemia sp embolization l4 and l5 lumbar artery 10/28  # lupus anticoagulant +  HD via permacath per renal, midodrine during dialysis  sp L AVF   12/13 pt pulled out permacath, replaced with new permacath 12/14  per renal, AVF needs more time to mature    # Parkinson/s disease   # delirium  cont on depakote and cymbalta  c/w trihexyphenidyl, carbidopa/levodopa   per neuro, probable early lewy body dementia related paranoia and agitation  will decrease seroquel to 25mg BID and monitor mental status    # DM2 (diabetes mellitus, type 2)  per endo  hba1c 6.4    # CAD (coronary artery disease)  # HTN  lopressor, atorvastatin  asa on hold    # FTT  no improvement on Remeron, cont to monitor  son HCP is interested in artificial nutrition, GI evaluated but pt covid pos, on quarantine  cont supplement shakes  ?PEG placement once quarantine over    # surveillance COVID positive 12/29  asymptommatic  sating well on RA  no indication for tx    DVT ppx hsq    PCP Dr. Simons    DNR/DNI    [facility]  [phone]

## 2022-01-05 NOTE — PROGRESS NOTE ADULT - ASSESSMENT
Assessment  DMT2: 71y Male with DM T2 with hyperglycemia, A1C 6.4%, was on insulin at home, now on insulin coverage only, blood sugars are stable and trending within acceptable range, no hypoglycemias. Patient remains on precautions for covid, breathing comfortably on room air, NAD, has minimal po intake, pending peg eval.  Hypothyroidism: Patient has no history thyroid disease, was not on any thyroid supplements, subclinical with low-normal FT4, lethargic, started on synthroid 25 mcg po daily, repeat FT4 trending down, increased to synthroid  50 mcg po daily, FT4 improving.  Hypercalcemia: Started on Sensipar 60mg daily, Ca fluctuating/remains elevated..  CHF: on medications, stable, monitored.  HTN: Controlled,  on antihypertensive medications.  Parkinsons: on meds, monitored.  CKD: Monitor labs/BMP      Dr Ayers  Cell : [phone]

## 2022-01-06 NOTE — PROGRESS NOTE ADULT - SUBJECTIVE AND OBJECTIVE BOX
Patient is a 71y old  Male who presents with a chief complaint of leg swelling (06 Jan 2022 09:37)      SUBJECTIVE / OVERNIGHT EVENTS:    Patient seen and examined. no cp sob. ate some oatmeal and some orange juice this morning per PCA.      Vital Signs Last 24 Hrs  T(C): 36.5 (06 Jan 2022 12:25), Max: 36.5 (06 Jan 2022 04:26)  T(F): 97.7 (06 Jan 2022 12:25), Max: 97.7 (06 Jan 2022 04:26)  HR: 70 (06 Jan 2022 12:25) (70 - 109)  BP: 106/71 (06 Jan 2022 12:25) (99/65 - 106/71)  BP(mean): --  RR: 18 (06 Jan 2022 12:25) (18 - 18)  SpO2: 95% (06 Jan 2022 12:25) (95% - 98%)  I&O's Summary    05 Jan 2022 07:01  -  06 Jan 2022 07:00  --------------------------------------------------------  IN: 60 mL / OUT: 400 mL / NET: -340 mL    06 Jan 2022 07:01  -  06 Jan 2022 13:20  --------------------------------------------------------  IN: 50 mL / OUT: 0 mL / NET: 50 mL        PE:  GENERAL: NAD, AAOx1-2, frail  HEAD:  Atraumatic, Normocephalic  EYES: EOMI, PERRLA, conjunctiva and sclera clear  NECK: No JVD  CHEST/LUNG: CTABL, No wheeze, right permacath  HEART: Regular rate and rhythm; no murmur  ABDOMEN: Soft, Nontender, Nondistended; Bowel sounds present  EXTREMITIES:  2+ Peripheral Pulses, No clubbing, cyanosis, or edema  SKIN: left forearm avf  NEURO: No focal deficits    LABS:            CAPILLARY BLOOD GLUCOSE      POCT Blood Glucose.: 100 mg/dL (06 Jan 2022 11:44)  POCT Blood Glucose.: 97 mg/dL (06 Jan 2022 08:03)  POCT Blood Glucose.: 114 mg/dL (05 Jan 2022 21:27)  POCT Blood Glucose.: 100 mg/dL (05 Jan 2022 17:03)            RADIOLOGY & ADDITIONAL TESTS:    Imaging Personally Reviewed:  [x] YES  [ ] NO    Consultant(s) Notes Reviewed:  [x] YES  [ ] NO    MEDICATIONS  (STANDING):  atorvastatin 80 milliGRAM(s) Oral at bedtime  carbidopa/levodopa  25/100 2.5 Tablet(s) Oral <User Schedule>  carbidopa/levodopa  25/100 2 Tablet(s) Oral <User Schedule>  chlorhexidine 4% Liquid 1 Application(s) Topical <User Schedule>  cholecalciferol 1000 Unit(s) Oral daily  cinacalcet 60 milliGRAM(s) Oral daily  dextrose 40% Gel 15 Gram(s) Oral once  dextrose 5%. 1000 milliLiter(s) (50 mL/Hr) IV Continuous <Continuous>  dextrose 5%. 1000 milliLiter(s) (100 mL/Hr) IV Continuous <Continuous>  dextrose 50% Injectable 25 Gram(s) IV Push once  dextrose 50% Injectable 12.5 Gram(s) IV Push once  dextrose 50% Injectable 25 Gram(s) IV Push once  diVALproex Sprinkle 250 milliGRAM(s) Oral three times a day  DULoxetine 20 milliGRAM(s) Oral daily  epoetin mari-epbx (RETACRIT) Injectable 4000 Unit(s) IV Push once  folic acid 1 milliGRAM(s) Oral daily  glucagon  Injectable 1 milliGRAM(s) IntraMuscular once  heparin   Injectable 5000 Unit(s) SubCutaneous every 12 hours  insulin lispro (ADMELOG) corrective regimen sliding scale   SubCutaneous three times a day before meals  insulin lispro (ADMELOG) corrective regimen sliding scale   SubCutaneous at bedtime  levothyroxine 50 MICROGram(s) Oral daily  memantine 5 milliGRAM(s) Oral at bedtime  metoprolol tartrate 50 milliGRAM(s) Oral two times a day  midodrine 10 milliGRAM(s) Oral <User Schedule>  mirtazapine 7.5 milliGRAM(s) Oral daily  Nephro-celeste 1 Tablet(s) Oral daily  polyethylene glycol 3350 17 Gram(s) Oral two times a day  QUEtiapine 25 milliGRAM(s) Oral two times a day  senna Syrup 10 milliLiter(s) Oral at bedtime  trihexyphenidyl 2 milliGRAM(s) Oral three times a day    MEDICATIONS  (PRN):  acetaminophen     Tablet .. 650 milliGRAM(s) Oral every 6 hours PRN Mild Pain (1 - 3)  albuterol/ipratropium for Nebulization 3 milliLiter(s) Nebulizer every 6 hours PRN Shortness of Breath and/or Wheezing  diVALproex Sprinkle 125 milliGRAM(s) Oral every 8 hours PRN Agitation  guaiFENesin Oral Liquid (Sugar-Free) 200 milliGRAM(s) Oral every 6 hours PRN Cough  ondansetron Injectable 4 milliGRAM(s) IV Push once PRN Nausea and/or Vomiting  QUEtiapine 12.5 milliGRAM(s) Oral every 6 hours PRN agitation  sodium chloride 0.9% lock flush 10 milliLiter(s) IV Push every 1 hour PRN Pre/post blood products, medications, blood draw, and to maintain line patency      Care Discussed with Consultants/Other Providers [x] YES  [ ] NO    HEALTH ISSUES - PROBLEM Dx:  Acute heart failure    Atrial flutter    DM2 (diabetes mellitus, type 2)    CAD (coronary artery disease)    Parkinsons disease    Acute on chronic renal failure    NSTEMI (non-ST elevation myocardial infarction)    Hypertension    Acute kidney injury superimposed on CKD    Anemia    Hypothyroidism    Delirium    Advanced care planning/counseling discussion    Palliative care status    Palliative care encounter    Pain    Stage 5 chronic kidney disease not on chronic dialysis    Hypercalcemia

## 2022-01-06 NOTE — PROGRESS NOTE ADULT - SUBJECTIVE AND OBJECTIVE BOX
Chief complaint  Patient is a 71y old  Male who presents with a chief complaint of leg swelling (06 Jan 2022 13:20)   Review of systems  Patient in bed, looks comfortable, no hypoglycemic episodes.    Labs and Fingersticks  CAPILLARY BLOOD GLUCOSE      POCT Blood Glucose.: 100 mg/dL (06 Jan 2022 11:44)  POCT Blood Glucose.: 97 mg/dL (06 Jan 2022 08:03)  POCT Blood Glucose.: 114 mg/dL (05 Jan 2022 21:27)  POCT Blood Glucose.: 100 mg/dL (05 Jan 2022 17:03)                        Medications  MEDICATIONS  (STANDING):  atorvastatin 80 milliGRAM(s) Oral at bedtime  carbidopa/levodopa  25/100 2.5 Tablet(s) Oral <User Schedule>  carbidopa/levodopa  25/100 2 Tablet(s) Oral <User Schedule>  chlorhexidine 4% Liquid 1 Application(s) Topical <User Schedule>  cholecalciferol 1000 Unit(s) Oral daily  cinacalcet 60 milliGRAM(s) Oral daily  dextrose 40% Gel 15 Gram(s) Oral once  dextrose 5%. 1000 milliLiter(s) (50 mL/Hr) IV Continuous <Continuous>  dextrose 5%. 1000 milliLiter(s) (100 mL/Hr) IV Continuous <Continuous>  dextrose 50% Injectable 25 Gram(s) IV Push once  dextrose 50% Injectable 12.5 Gram(s) IV Push once  dextrose 50% Injectable 25 Gram(s) IV Push once  diVALproex Sprinkle 250 milliGRAM(s) Oral three times a day  DULoxetine 20 milliGRAM(s) Oral daily  epoetin mari-epbx (RETACRIT) Injectable 4000 Unit(s) IV Push once  folic acid 1 milliGRAM(s) Oral daily  glucagon  Injectable 1 milliGRAM(s) IntraMuscular once  heparin   Injectable 5000 Unit(s) SubCutaneous every 12 hours  insulin lispro (ADMELOG) corrective regimen sliding scale   SubCutaneous three times a day before meals  insulin lispro (ADMELOG) corrective regimen sliding scale   SubCutaneous at bedtime  levothyroxine 50 MICROGram(s) Oral daily  memantine 5 milliGRAM(s) Oral at bedtime  metoprolol tartrate 50 milliGRAM(s) Oral two times a day  midodrine 10 milliGRAM(s) Oral <User Schedule>  mirtazapine 7.5 milliGRAM(s) Oral daily  Nephro-celeste 1 Tablet(s) Oral daily  polyethylene glycol 3350 17 Gram(s) Oral two times a day  QUEtiapine 25 milliGRAM(s) Oral two times a day  senna Syrup 10 milliLiter(s) Oral at bedtime  trihexyphenidyl 2 milliGRAM(s) Oral three times a day      Physical Exam  General: Patient comfortable in bed  Vital Signs Last 12 Hrs  T(F): 97.7 (01-06-22 @ 12:25), Max: 97.7 (01-06-22 @ 04:26)  HR: 70 (01-06-22 @ 12:25) (70 - 89)  BP: 106/71 (01-06-22 @ 12:25) (100/63 - 106/71)  BP(mean): --  RR: 18 (01-06-22 @ 12:25) (18 - 18)  SpO2: 95% (01-06-22 @ 12:25) (95% - 98%)  Neck: No palpable thyroid nodules.  CVS: S1S2, No murmurs  Respiratory: No wheezing, no crepitations  GI: Abdomen soft, bowel sounds positive  Musculoskeletal:  edema lower extremities.   Skin: No skin rashes, no ecchymosis    Diagnostics    Free Thyroxine, Serum: AM Sched. Collection: 16-Dec-2021 06:00 (12-15 @ 14:39)

## 2022-01-06 NOTE — PROGRESS NOTE ADULT - ASSESSMENT
Assessment  DMT2: 71y Male with DM T2 with hyperglycemia, A1C 6.4%, was on insulin at home, now on insulin coverage only, blood sugars are stable and trending within acceptable range, no hypoglycemias. Patient with poor po intake, he remains on precautions for covid, for possible peg once s/p quarantine.  Hypothyroidism: Patient has no history thyroid disease, was not on any thyroid supplements, subclinical with low-normal FT4, lethargic, started on synthroid 25 mcg po daily, repeat FT4 trending down, increased to synthroid  50 mcg po daily, FT4 improving.  Hypercalcemia: Started on Sensipar 60mg daily, Ca fluctuating/remains elevated..  CHF: on medications, stable, monitored.  HTN: Controlled,  on antihypertensive medications.  Parkinsons: on meds, monitored.  CKD: Monitor labs/BMP      Dr Ayers  Cell : [phone]

## 2022-01-06 NOTE — PROGRESS NOTE ADULT - SUBJECTIVE AND OBJECTIVE BOX
[facility] [phone]        Patient seen and examined in bed.  He was more alert this am and did eat some breakfast         MEDICATIONS  (STANDING):  atorvastatin 80 milliGRAM(s) Oral at bedtime  carbidopa/levodopa  25/100 2.5 Tablet(s) Oral <User Schedule>  carbidopa/levodopa  25/100 2 Tablet(s) Oral <User Schedule>  chlorhexidine 4% Liquid 1 Application(s) Topical <User Schedule>  cholecalciferol 1000 Unit(s) Oral daily  cinacalcet 60 milliGRAM(s) Oral daily  dextrose 40% Gel 15 Gram(s) Oral once  dextrose 5%. 1000 milliLiter(s) (50 mL/Hr) IV Continuous <Continuous>  dextrose 5%. 1000 milliLiter(s) (100 mL/Hr) IV Continuous <Continuous>  dextrose 50% Injectable 25 Gram(s) IV Push once  dextrose 50% Injectable 12.5 Gram(s) IV Push once  dextrose 50% Injectable 25 Gram(s) IV Push once  diVALproex Sprinkle 250 milliGRAM(s) Oral three times a day  DULoxetine 20 milliGRAM(s) Oral daily  epoetin mari-epbx (RETACRIT) Injectable 4000 Unit(s) IV Push once  folic acid 1 milliGRAM(s) Oral daily  glucagon  Injectable 1 milliGRAM(s) IntraMuscular once  heparin   Injectable 5000 Unit(s) SubCutaneous every 12 hours  insulin lispro (ADMELOG) corrective regimen sliding scale   SubCutaneous three times a day before meals  insulin lispro (ADMELOG) corrective regimen sliding scale   SubCutaneous at bedtime  levothyroxine 50 MICROGram(s) Oral daily  memantine 5 milliGRAM(s) Oral at bedtime  metoprolol tartrate 50 milliGRAM(s) Oral two times a day  midodrine 10 milliGRAM(s) Oral <User Schedule>  mirtazapine 7.5 milliGRAM(s) Oral daily  Nephro-celeste 1 Tablet(s) Oral daily  polyethylene glycol 3350 17 Gram(s) Oral two times a day  QUEtiapine 25 milliGRAM(s) Oral two times a day  senna Syrup 10 milliLiter(s) Oral at bedtime  trihexyphenidyl 2 milliGRAM(s) Oral three times a day      VITAL:  T(C): , Max: 36.5 (01-06-22 @ 04:26)  T(F): , Max: 97.7 (01-06-22 @ 04:26)  HR: 89 (01-06-22 @ 04:26)  BP: 100/63 (01-06-22 @ 04:26)  BP(mean): --  RR: 18 (01-06-22 @ 04:26)  SpO2: 98% (01-06-22 @ 04:26)  Wt(kg): --    I and O's:    01-05 @ 07:01  -  01-06 @ 07:00  --------------------------------------------------------  IN: 60 mL / OUT: 400 mL / NET: -340 mL          PHYSICAL EXAM:    Constitutional: NAD  Neck:  No JVD  Respiratory: CTAB/L  Cardiovascular: S1 and S2  Gastrointestinal: BS+, soft, NT/ND  Extremities: No peripheral edema  Neurological: A/O x 3, no focal deficits  Psychiatric: Normal mood, normal affect  : No Javier  Skin: No rashes  Access: Not applicable    LABS:                Urine Studies:          RADIOLOGY & ADDITIONAL STUDIES:

## 2022-01-06 NOTE — PROGRESS NOTE ADULT - ASSESSMENT
70yo M w/ PMHx of CAD (s/p CABG 2019), CKD (unknown stage), DM2, Parkinson's Disease, HTN, depression presents with new onset acute heart failure exacerbation, NSTEMI, started on hep gtt, developed bl RP bleed, acute anemia. sp MICU course for hemorrhagic shock, 10/28 sp IR embolization l4 and l5 lumbar artery. sp permacath and AVF.    # Acute on chronic systolic and diastolic heart failure  # NSTEMI, medical management  # ESRD, new HD  # Atrial flutter  # acute hypoxic resp failure  # hemorrhagic shock, left RP hematoma, acute blood loss anemia sp embolization l4 and l5 lumbar artery 10/28  # lupus anticoagulant +  HD via permacath per renal, midodrine during dialysis  sp L AVF   12/13 pt pulled out permacath, replaced with new permacath 12/14  per renal, AVF needs more time to mature    # Parkinson's disease   # delirium  cont on depakote and cymbalta  c/w trihexyphenidyl, carbidopa/levodopa   seroquel 25mg BID decreased, monitor    # DM2 (diabetes mellitus, type 2)  per endo  hba1c 6.4    # CAD (coronary artery disease)  # HTN  lopressor, atorvastatin  asa on hold    # FTT  no improvement on Remeron, cont to monitor  son HCP is interested in artificial nutrition, GI evaluated but pt covid pos, on quarantine until 1/7  cont supplement shakes  possible PEG early next week pending scheduling as dw GI    # surveillance COVID positive 12/28  asymptomatic  sating well on RA  no indication for tx    DVT ppx hsq    PCP Dr. Simons    DNR/DNI    [facility]  [phone]

## 2022-01-06 NOTE — PROGRESS NOTE ADULT - ASSESSMENT
ASSESSMENT/PLAN  70yo M w/ PMHx of CAD (s/p CABG 2019), CKD (unknown stage), DM2, Parkinson's Disease, HTN, depression presents with bilateral leg swelling  ESRD   Severe LV dysfunction; A flutter    + COVID      1 Palliation - Intermittent confusion ;   Seroquel change to 50mg po qpm?  2 Renal-  HD today and no fluid removal   3 CVS- BP controlled at present, on Midodrine with parameters, Lopressor for heart rate control   4 Anemia - hgb improving  5 Vasc - s/p  LUE AVF--Immature (this will take weeks to months to mature) ;  If he stays longer then will try for BAM maturation via IR when he is out of covid isolation;  Perm cath   6 Endo - Off the lantus at present due to hypoglycemia   7 GI - on Remeron now but GI consult for possible peg.    8 ID- Covid positive but asymptomatic          Sayed [facility]   [employer]   [phone]

## 2022-01-07 NOTE — PROGRESS NOTE ADULT - SUBJECTIVE AND OBJECTIVE BOX
Patient is a 71y old  Male who presents with a chief complaint of leg swelling (07 Jan 2022 12:09)      SUBJECTIVE / OVERNIGHT EVENTS:    Patient seen and examined. sleeping. denies complaints. drank 1 bottle nepro per aid this morning.      Vital Signs Last 24 Hrs  T(C): 36.5 (07 Jan 2022 11:00), Max: 37.1 (06 Jan 2022 22:09)  T(F): 97.7 (07 Jan 2022 11:00), Max: 98.7 (06 Jan 2022 22:09)  HR: 72 (07 Jan 2022 11:50) (72 - 120)  BP: 103/61 (07 Jan 2022 11:50) (101/63 - 120/70)  BP(mean): --  RR: 18 (07 Jan 2022 11:00) (18 - 20)  SpO2: 96% (07 Jan 2022 11:00) (96% - 99%)  I&O's Summary    06 Jan 2022 07:01  -  07 Jan 2022 07:00  --------------------------------------------------------  IN: 290 mL / OUT: 0 mL / NET: 290 mL    07 Jan 2022 07:01  -  07 Jan 2022 14:13  --------------------------------------------------------  IN: 240 mL / OUT: 0 mL / NET: 240 mL        PE:  GENERAL: NAD, AAOx1-2, frail  HEAD:  Atraumatic, Normocephalic  CHEST/LUNG: CTABL, No wheeze, right permacath  HEART: Regular rate and rhythm; no murmur  ABDOMEN: Soft, Nontender, Nondistended; Bowel sounds present  EXTREMITIES:  2+ Peripheral Pulses, No clubbing, cyanosis, or edema  SKIN: left forearm avf  NEURO: No focal deficits    LABS:                        10.5   8.73  )-----------( 221      ( 06 Jan 2022 15:45 )             34.7     01-07    135  |  101  |  29<H>  ----------------------------<  130<H>  4.3   |  19<L>  |  3.06<H>    Ca    10.1      07 Jan 2022 12:38        CAPILLARY BLOOD GLUCOSE      POCT Blood Glucose.: 149 mg/dL (07 Jan 2022 11:37)  POCT Blood Glucose.: 107 mg/dL (07 Jan 2022 07:46)  POCT Blood Glucose.: 86 mg/dL (06 Jan 2022 22:00)  POCT Blood Glucose.: 95 mg/dL (06 Jan 2022 17:18)            RADIOLOGY & ADDITIONAL TESTS:    Imaging Personally Reviewed:  [x] YES  [ ] NO    Consultant(s) Notes Reviewed:  [x] YES  [ ] NO    MEDICATIONS  (STANDING):  atorvastatin 80 milliGRAM(s) Oral at bedtime  carbidopa/levodopa  25/100 2.5 Tablet(s) Oral <User Schedule>  carbidopa/levodopa  25/100 2 Tablet(s) Oral <User Schedule>  chlorhexidine 4% Liquid 1 Application(s) Topical <User Schedule>  cholecalciferol 1000 Unit(s) Oral daily  cinacalcet 60 milliGRAM(s) Oral daily  dextrose 40% Gel 15 Gram(s) Oral once  dextrose 5%. 1000 milliLiter(s) (50 mL/Hr) IV Continuous <Continuous>  dextrose 5%. 1000 milliLiter(s) (100 mL/Hr) IV Continuous <Continuous>  dextrose 50% Injectable 25 Gram(s) IV Push once  dextrose 50% Injectable 12.5 Gram(s) IV Push once  dextrose 50% Injectable 25 Gram(s) IV Push once  diVALproex Sprinkle 250 milliGRAM(s) Oral three times a day  DULoxetine 20 milliGRAM(s) Oral daily  folic acid 1 milliGRAM(s) Oral daily  glucagon  Injectable 1 milliGRAM(s) IntraMuscular once  heparin   Injectable 5000 Unit(s) SubCutaneous every 12 hours  insulin lispro (ADMELOG) corrective regimen sliding scale   SubCutaneous three times a day before meals  insulin lispro (ADMELOG) corrective regimen sliding scale   SubCutaneous at bedtime  levothyroxine 50 MICROGram(s) Oral daily  memantine 5 milliGRAM(s) Oral at bedtime  metoprolol tartrate 50 milliGRAM(s) Oral two times a day  midodrine 10 milliGRAM(s) Oral <User Schedule>  mirtazapine 7.5 milliGRAM(s) Oral daily  Nephro-celeste 1 Tablet(s) Oral daily  polyethylene glycol 3350 17 Gram(s) Oral two times a day  QUEtiapine 50 milliGRAM(s) Oral at bedtime  senna Syrup 10 milliLiter(s) Oral at bedtime  trihexyphenidyl 2 milliGRAM(s) Oral three times a day    MEDICATIONS  (PRN):  acetaminophen     Tablet .. 650 milliGRAM(s) Oral every 6 hours PRN Mild Pain (1 - 3)  albuterol/ipratropium for Nebulization 3 milliLiter(s) Nebulizer every 6 hours PRN Shortness of Breath and/or Wheezing  diVALproex Sprinkle 125 milliGRAM(s) Oral every 8 hours PRN Agitation  guaiFENesin Oral Liquid (Sugar-Free) 200 milliGRAM(s) Oral every 6 hours PRN Cough  ondansetron Injectable 4 milliGRAM(s) IV Push once PRN Nausea and/or Vomiting  QUEtiapine 12.5 milliGRAM(s) Oral every 6 hours PRN agitation  sodium chloride 0.9% lock flush 10 milliLiter(s) IV Push every 1 hour PRN Pre/post blood products, medications, blood draw, and to maintain line patency      Care Discussed with Consultants/Other Providers [x] YES  [ ] NO    HEALTH ISSUES - PROBLEM Dx:  Acute heart failure    Atrial flutter    DM2 (diabetes mellitus, type 2)    CAD (coronary artery disease)    Parkinsons disease    Acute on chronic renal failure    NSTEMI (non-ST elevation myocardial infarction)    Hypertension    Acute kidney injury superimposed on CKD    Anemia    Hypothyroidism    Delirium    Advanced care planning/counseling discussion    Palliative care status    Palliative care encounter    Pain    Stage 5 chronic kidney disease not on chronic dialysis    Hypercalcemia    Preop cardiovascular exam

## 2022-01-07 NOTE — PROGRESS NOTE ADULT - SUBJECTIVE AND OBJECTIVE BOX
NE[facility]OLOGY[facility] [phone]        Patient seen and examined in bed.  He was eating more         MEDICATIONS  (STANDING):  atorvastatin 80 milliGRAM(s) Oral at bedtime  carbidopa/levodopa  25/100 2.5 Tablet(s) Oral <User Schedule>  carbidopa/levodopa  25/100 2 Tablet(s) Oral <User Schedule>  chlorhexidine 4% Liquid 1 Application(s) Topical <User Schedule>  cholecalciferol 1000 Unit(s) Oral daily  cinacalcet 60 milliGRAM(s) Oral daily  dextrose 40% Gel 15 Gram(s) Oral once  dextrose 5%. 1000 milliLiter(s) (50 mL/Hr) IV Continuous <Continuous>  dextrose 5%. 1000 milliLiter(s) (100 mL/Hr) IV Continuous <Continuous>  dextrose 50% Injectable 25 Gram(s) IV Push once  dextrose 50% Injectable 12.5 Gram(s) IV Push once  dextrose 50% Injectable 25 Gram(s) IV Push once  diVALproex Sprinkle 250 milliGRAM(s) Oral three times a day  DULoxetine 20 milliGRAM(s) Oral daily  folic acid 1 milliGRAM(s) Oral daily  glucagon  Injectable 1 milliGRAM(s) IntraMuscular once  heparin   Injectable 5000 Unit(s) SubCutaneous every 12 hours  insulin lispro (ADMELOG) corrective regimen sliding scale   SubCutaneous three times a day before meals  insulin lispro (ADMELOG) corrective regimen sliding scale   SubCutaneous at bedtime  levothyroxine 50 MICROGram(s) Oral daily  memantine 5 milliGRAM(s) Oral at bedtime  metoprolol tartrate 50 milliGRAM(s) Oral two times a day  midodrine 10 milliGRAM(s) Oral <User Schedule>  mirtazapine 7.5 milliGRAM(s) Oral daily  Nephro-celeste 1 Tablet(s) Oral daily  polyethylene glycol 3350 17 Gram(s) Oral two times a day  QUEtiapine 25 milliGRAM(s) Oral two times a day  senna Syrup 10 milliLiter(s) Oral at bedtime  trihexyphenidyl 2 milliGRAM(s) Oral three times a day      VITAL:  T(C): , Max: 37.1 (01-06-22 @ 22:09)  T(F): , Max: 98.7 (01-06-22 @ 22:09)  HR: 99 (01-07-22 @ 06:07)  BP: 119/77 (01-07-22 @ 06:07)  BP(mean): --  RR: 18 (01-07-22 @ 06:07)  SpO2: 98% (01-07-22 @ 06:07)  Wt(kg): --    I and O's:    01-06 @ 07:01  -  01-07 @ 07:00  --------------------------------------------------------  IN: 290 mL / OUT: 0 mL / NET: 290 mL    01-07 @ 07:01  -  01-07 @ 10:48  --------------------------------------------------------  IN: 240 mL / OUT: 0 mL / NET: 240 mL          PHYSICAL EXAM:    Constitutional: NAD  Neck:  No JVD  Respiratory: CTAB/L  Cardiovascular: S1 and S2  Gastrointestinal: BS+, soft, NT/ND  Extremities: No peripheral edema  Neurological: A   : No Javier  Skin: No rashes  Access: avf and perm cath     LABS:                        10.5   8.73  )-----------( 221      ( 06 Jan 2022 15:45 )             34.7     01-06    132<L>  |  94<L>  |  51<H>  ----------------------------<  114<H>  4.1   |  22  |  4.99<H>    Ca    10.3      06 Jan 2022 15:45            Urine Studies:          RADIOLOGY & ADDITIONAL STUDIES:

## 2022-01-07 NOTE — PROGRESS NOTE ADULT - ASSESSMENT
ASSESSMENT/PLAN  72yo M w/ PMHx of CAD (s/p CABG 2019), CKD (unknown stage), DM2, Parkinson's Disease, HTN, depression presents with bilateral leg swelling  ESRD   Severe LV dysfunction; A flutter    + COVID      1 Palliation - Intermittent confusion ;   Seroquel change to 50mg po qpm?  2 Renal-  HD today and no fluid removal   3 CVS- BP controlled at present, on Midodrine with parameters, Lopressor for heart rate control   4 Anemia - hgb improving  5 Vasc - s/p  LUE AVF--Immature (this will take weeks to months to mature) ;  If he stays longer then will try for BAM maturation; Perm cath   6 Endo - Off the lantus at present due to hypoglycemia   7 GI - on Remeron and eating more now         Sayed [facility]   [phone]

## 2022-01-07 NOTE — PROGRESS NOTE ADULT - SUBJECTIVE AND OBJECTIVE BOX
Chief complaint  Patient is a 71y old  Male who presents with a chief complaint of leg swelling (07 Jan 2022 10:45)   Review of systems  Patient in bed, looks comfortable, no hypoglycemic episodes.    Labs and Fingersticks  CAPILLARY BLOOD GLUCOSE      POCT Blood Glucose.: 149 mg/dL (07 Jan 2022 11:37)  POCT Blood Glucose.: 107 mg/dL (07 Jan 2022 07:46)  POCT Blood Glucose.: 86 mg/dL (06 Jan 2022 22:00)  POCT Blood Glucose.: 95 mg/dL (06 Jan 2022 17:18)      Anion Gap, Serum: 16 (01-06 @ 15:45)      Calcium, Total Serum: 10.3 (01-06 @ 15:45)          01-06    132<L>  |  94<L>  |  51<H>  ----------------------------<  114<H>  4.1   |  22  |  4.99<H>    Ca    10.3      06 Jan 2022 15:45                          10.5   8.73  )-----------( 221      ( 06 Jan 2022 15:45 )             34.7     Medications  MEDICATIONS  (STANDING):  atorvastatin 80 milliGRAM(s) Oral at bedtime  carbidopa/levodopa  25/100 2.5 Tablet(s) Oral <User Schedule>  carbidopa/levodopa  25/100 2 Tablet(s) Oral <User Schedule>  chlorhexidine 4% Liquid 1 Application(s) Topical <User Schedule>  cholecalciferol 1000 Unit(s) Oral daily  cinacalcet 60 milliGRAM(s) Oral daily  dextrose 40% Gel 15 Gram(s) Oral once  dextrose 5%. 1000 milliLiter(s) (50 mL/Hr) IV Continuous <Continuous>  dextrose 5%. 1000 milliLiter(s) (100 mL/Hr) IV Continuous <Continuous>  dextrose 50% Injectable 25 Gram(s) IV Push once  dextrose 50% Injectable 12.5 Gram(s) IV Push once  dextrose 50% Injectable 25 Gram(s) IV Push once  diVALproex Sprinkle 250 milliGRAM(s) Oral three times a day  DULoxetine 20 milliGRAM(s) Oral daily  folic acid 1 milliGRAM(s) Oral daily  glucagon  Injectable 1 milliGRAM(s) IntraMuscular once  heparin   Injectable 5000 Unit(s) SubCutaneous every 12 hours  insulin lispro (ADMELOG) corrective regimen sliding scale   SubCutaneous three times a day before meals  insulin lispro (ADMELOG) corrective regimen sliding scale   SubCutaneous at bedtime  levothyroxine 50 MICROGram(s) Oral daily  memantine 5 milliGRAM(s) Oral at bedtime  metoprolol tartrate 50 milliGRAM(s) Oral two times a day  midodrine 10 milliGRAM(s) Oral <User Schedule>  mirtazapine 7.5 milliGRAM(s) Oral daily  Nephro-celeste 1 Tablet(s) Oral daily  polyethylene glycol 3350 17 Gram(s) Oral two times a day  QUEtiapine 25 milliGRAM(s) Oral two times a day  senna Syrup 10 milliLiter(s) Oral at bedtime  trihexyphenidyl 2 milliGRAM(s) Oral three times a day      Physical Exam  General: Patient comfortable in bed  Vital Signs Last 12 Hrs  T(F): 97.7 (01-07-22 @ 11:00), Max: 97.7 (01-07-22 @ 06:07)  HR: 96 (01-07-22 @ 11:00) (96 - 99)  BP: 101/63 (01-07-22 @ 11:00) (101/63 - 119/77)  BP(mean): --  RR: 18 (01-07-22 @ 11:00) (18 - 18)  SpO2: 96% (01-07-22 @ 11:00) (96% - 98%)  Neck: No palpable thyroid nodules.  CVS: S1S2, No murmurs  Respiratory: No wheezing, no crepitations  GI: Abdomen soft, bowel sounds positive  Musculoskeletal:  edema lower extremities.   Skin: No skin rashes, no ecchymosis    Diagnostics    Free Thyroxine, Serum: AM Sched. Collection: 16-Dec-2021 06:00 (12-15 @ 14:39)

## 2022-01-07 NOTE — PROGRESS NOTE ADULT - SUBJECTIVE AND OBJECTIVE BOX
[facility] Cardiology Progress Note  _______________________________    Pt. seen and examined.     Telemetry -atrial flutter 80-120s    T(C): 36.5 (01-07-22 @ 06:07), Max: 37.1 (01-06-22 @ 22:09)  HR: 99 (01-07-22 @ 06:07) (70 - 120)  BP: 119/77 (01-07-22 @ 06:07) (106/71 - 120/70)  RR: 18 (01-07-22 @ 06:07) (18 - 20)  SpO2: 98% (01-07-22 @ 06:07) (95% - 99%)  I&O's Summary    06 Jan 2022 07:01  -  07 Jan 2022 07:00  --------------------------------------------------------  IN: 290 mL / OUT: 0 mL / NET: 290 mL        PHYSICAL EXAM:  GENERAL: NAD.  NECK: Supple.  CHEST/LUNG: Clear to anterior auscultation bilaterally; No significant wheezes, rales, or rhonchi.  HEART: S1 S2 irregular.   ABDOMEN: Soft, Nondistended  EXTREMITIES:  No LE edema.      LABS:                        10.5   8.73  )-----------( 221      ( 06 Jan 2022 15:45 )             34.7     01-06    132<L>  |  94<L>  |  51<H>  ----------------------------<  114<H>  4.1   |  22  |  4.99<H>    Ca    10.3      06 Jan 2022 15:45                    MEDICATIONS  (STANDING):  atorvastatin 80 milliGRAM(s) Oral at bedtime  carbidopa/levodopa  25/100 2.5 Tablet(s) Oral <User Schedule>  carbidopa/levodopa  25/100 2 Tablet(s) Oral <User Schedule>  chlorhexidine 4% Liquid 1 Application(s) Topical <User Schedule>  cholecalciferol 1000 Unit(s) Oral daily  cinacalcet 60 milliGRAM(s) Oral daily  dextrose 40% Gel 15 Gram(s) Oral once  dextrose 5%. 1000 milliLiter(s) (50 mL/Hr) IV Continuous <Continuous>  dextrose 5%. 1000 milliLiter(s) (100 mL/Hr) IV Continuous <Continuous>  dextrose 50% Injectable 25 Gram(s) IV Push once  dextrose 50% Injectable 12.5 Gram(s) IV Push once  dextrose 50% Injectable 25 Gram(s) IV Push once  diVALproex Sprinkle 250 milliGRAM(s) Oral three times a day  DULoxetine 20 milliGRAM(s) Oral daily  folic acid 1 milliGRAM(s) Oral daily  glucagon  Injectable 1 milliGRAM(s) IntraMuscular once  heparin   Injectable 5000 Unit(s) SubCutaneous every 12 hours  insulin lispro (ADMELOG) corrective regimen sliding scale   SubCutaneous three times a day before meals  insulin lispro (ADMELOG) corrective regimen sliding scale   SubCutaneous at bedtime  levothyroxine 50 MICROGram(s) Oral daily  memantine 5 milliGRAM(s) Oral at bedtime  metoprolol tartrate 50 milliGRAM(s) Oral two times a day  midodrine 10 milliGRAM(s) Oral <User Schedule>  mirtazapine 7.5 milliGRAM(s) Oral daily  Nephro-celeste 1 Tablet(s) Oral daily  polyethylene glycol 3350 17 Gram(s) Oral two times a day  QUEtiapine 25 milliGRAM(s) Oral two times a day  senna Syrup 10 milliLiter(s) Oral at bedtime  trihexyphenidyl 2 milliGRAM(s) Oral three times a day    MEDICATIONS  (PRN):  acetaminophen     Tablet .. 650 milliGRAM(s) Oral every 6 hours PRN Mild Pain (1 - 3)  albuterol/ipratropium for Nebulization 3 milliLiter(s) Nebulizer every 6 hours PRN Shortness of Breath and/or Wheezing  diVALproex Sprinkle 125 milliGRAM(s) Oral every 8 hours PRN Agitation  guaiFENesin Oral Liquid (Sugar-Free) 200 milliGRAM(s) Oral every 6 hours PRN Cough  ondansetron Injectable 4 milliGRAM(s) IV Push once PRN Nausea and/or Vomiting  QUEtiapine 12.5 milliGRAM(s) Oral every 6 hours PRN agitation  sodium chloride 0.9% lock flush 10 milliLiter(s) IV Push every 1 hour PRN Pre/post blood products, medications, blood draw, and to maintain line patency        RADIOLOGY & ADDITIONAL TESTS:

## 2022-01-07 NOTE — PROGRESS NOTE ADULT - PROBLEM SELECTOR PLAN 1
-  -rates controlled.   -lopressor 50 mg bid.  -not a candidate for anticoagulation given comorbidities as noted earlier.

## 2022-01-07 NOTE — PROGRESS NOTE ADULT - PROBLEM SELECTOR PLAN 4
-  -Recommendation is to proceed with PEG procedure.   -No absolute cardiac contraindications  -No evidence of fluid overload or active coronary ischemia.     follow up as needed.     patient follows with Dr. vega ([place])    Go [facility] D.O.  [phone]

## 2022-01-07 NOTE — PROGRESS NOTE ADULT - ASSESSMENT
72yo M w/ PMHx of CAD (s/p CABG 2019), CKD (unknown stage), DM2, Parkinson's Disease, HTN, depression presents with new onset acute heart failure exacerbation, NSTEMI, started on hep gtt, developed bl RP bleed, acute anemia. sp MICU course for hemorrhagic shock, 10/28 sp IR embolization l4 and l5 lumbar artery. sp permacath and AVF.    # Acute on chronic systolic and diastolic heart failure  # NSTEMI, medical management  # ESRD, new HD  # Atrial flutter  # acute hypoxic resp failure  # hemorrhagic shock, left RP hematoma, acute blood loss anemia sp embolization l4 and l5 lumbar artery 10/28  # lupus anticoagulant +  HD via permacath per renal, midodrine during dialysis  sp L AVF   12/13 pt pulled out permacath, replaced with new permacath 12/14  per renal, AVF needs more time to mature    # Parkinson's disease   # delirium  cont on depakote and cymbalta  c/w trihexyphenidyl, carbidopa/levodopa   decrease seroquel 50mg qhs and monitor appetite    # DM2 (diabetes mellitus, type 2)  per endo  hba1c 6.4    # CAD (coronary artery disease)  # HTN  lopressor, atorvastatin  asa on hold    # FTT  no improvement on Remeron  son HCP is interested in artificial nutrition, GI evaluated but pt covid pos, off quarantine  cont supplement shakes  possible PEG early next week pending scheduling as dw GI  monitor calorie count on decreased seroquel dose    # surveillance COVID positive 12/28  asymptomatic off isolation  sating well on RA  no indication for tx    DVT ppx hsq    PCP Dr. Simons    DNR/DNI    [facility]  [phone]

## 2022-01-07 NOTE — CHART NOTE - NSCHARTNOTEFT_GEN_A_CORE
Nutrition Follow Up Note  Patient seen for: malnutrition follow-up. Chart reviewed, events noted.     "72yo M w/ PMHx of CAD (s/p CABG ), CKD (unknown stage), DM2, Parkinson's Disease, HTN, depression presents with new onset acute heart failure exacerbation, NSTEMI, started on hep gtt, developed bl RP bleed, acute anemia. sp MICU course for hemorrhagic shock, 10/28 sp IR embolization l4 and l5 lumbar artery. sp permacath and AVF."    Source: [] Patient       [x] Medical Record        [x] RN        [] Family at bedside       [] Other:    -If unable to interview patient: [] Trach/Vent/BiPAP  [] Disoriented/confused/inappropriate to interview    Diet Order:   Diet, Regular:   Supplement Feeding Modality:  Oral  Nepro Cans or Servings Per Day:  1       Frequency:  Daily  Ensure Pudding Cans or Servings Per Day:  1       Frequency:  Three Times a day (22)    - Is current order appropriate/adequate? [x] Yes  []  No:     - PO intake :   [] >75%  Adequate    [] 50-75%  Fair       [x] <50%  Poor    - Nutrition-related concerns:      - Pt now off isolation for COVID (>10 days since infection)      - Per chart: "possible PEG early next week pending scheduling as dw GI"      - Per chart, pt is eating more now, on Remeron, and pt drank 1 Nepro today per aid. Per RNs, pt ate a little today.       - Per chart, pt is ordered for Nephro-celeste, Vitamin D3, folic acid, sinemet, Synthroid (PO), and SSI Admelog      - Per RNs, calorie count sheet from - was lost, and a new one was initiated for -. RD to follow up with results upon completion.    GI: Denies nausea, vomiting, constipation, diarrhea.  Last BM  per chart.   Bowel Regimen? [x] Yes (miralax, senna)  [] No      Weights:   Daily Weight in k.6 (12-30), 67.1 (12-30), 69.3 (12-28),  69.8 (12-28),  68.6 (12-),  68.6 (12-26),  70.4 (12-24),  71 (12-23),  71 (12-23)  Dosing wt: 96.4 kg (12-14)  Noted with progressive wt decline, likely 2/2 poor PO intake. RD to continue to monitor weight trends as able/available.     MEDICATIONS  (STANDING):  atorvastatin 80 milliGRAM(s) Oral at bedtime  carbidopa/levodopa  25/100 2.5 Tablet(s) Oral <User Schedule>  carbidopa/levodopa  25/100 2 Tablet(s) Oral <User Schedule>  chlorhexidine 4% Liquid 1 Application(s) Topical <User Schedule>  cholecalciferol 1000 Unit(s) Oral daily  cinacalcet 60 milliGRAM(s) Oral daily  dextrose 40% Gel 15 Gram(s) Oral once  dextrose 5%. 1000 milliLiter(s) (50 mL/Hr) IV Continuous <Continuous>  dextrose 5%. 1000 milliLiter(s) (100 mL/Hr) IV Continuous <Continuous>  dextrose 50% Injectable 25 Gram(s) IV Push once  dextrose 50% Injectable 12.5 Gram(s) IV Push once  dextrose 50% Injectable 25 Gram(s) IV Push once  diVALproex Sprinkle 250 milliGRAM(s) Oral three times a day  DULoxetine 20 milliGRAM(s) Oral daily  folic acid 1 milliGRAM(s) Oral daily  glucagon  Injectable 1 milliGRAM(s) IntraMuscular once  heparin   Injectable 5000 Unit(s) SubCutaneous every 12 hours  insulin lispro (ADMELOG) corrective regimen sliding scale   SubCutaneous three times a day before meals  insulin lispro (ADMELOG) corrective regimen sliding scale   SubCutaneous at bedtime  levothyroxine 50 MICROGram(s) Oral daily  memantine 5 milliGRAM(s) Oral at bedtime  metoprolol tartrate 50 milliGRAM(s) Oral two times a day  midodrine 10 milliGRAM(s) Oral <User Schedule>  mirtazapine 7.5 milliGRAM(s) Oral daily  Nephro-celeste 1 Tablet(s) Oral daily  polyethylene glycol 3350 17 Gram(s) Oral two times a day  QUEtiapine 50 milliGRAM(s) Oral at bedtime  senna Syrup 10 milliLiter(s) Oral at bedtime  trihexyphenidyl 2 milliGRAM(s) Oral three times a day    MEDICATIONS  (PRN):  acetaminophen     Tablet .. 650 milliGRAM(s) Oral every 6 hours PRN Mild Pain (1 - 3)  albuterol/ipratropium for Nebulization 3 milliLiter(s) Nebulizer every 6 hours PRN Shortness of Breath and/or Wheezing  diVALproex Sprinkle 125 milliGRAM(s) Oral every 8 hours PRN Agitation  guaiFENesin Oral Liquid (Sugar-Free) 200 milliGRAM(s) Oral every 6 hours PRN Cough  ondansetron Injectable 4 milliGRAM(s) IV Push once PRN Nausea and/or Vomiting  QUEtiapine 12.5 milliGRAM(s) Oral every 6 hours PRN agitation  sodium chloride 0.9% lock flush 10 milliLiter(s) IV Push every 1 hour PRN Pre/post blood products, medications, blood draw, and to maintain line patency      Pertinent Labs:    @ 12:38: Na 135, BUN 29<H>, Cr 3.06<H>, <H>, K+ 4.3    A1C with Estimated Average Glucose Result: 6.4 % (10-22-21 @ 08:56)    Finger Sticks:  POCT Blood Glucose.: 149 mg/dL ( @ 11:37)  POCT Blood Glucose.: 107 mg/dL ( @ 07:46)  POCT Blood Glucose.: 86 mg/dL ( @ 22:00)  POCT Blood Glucose.: 95 mg/dL ( @ 17:18)      Skin per nursing documentation: No pressure injuries noted.  Edema per nursing documentation: none noted    Estimated Needs:   [x] no change since previous assessment  [id number] kcal/day (30-35 kcal/kg)   gm protein/day (1.2-1.4 g/kg)  Defer fluid needs to team  based on IBW 78 kg    Previous Nutrition Diagnosis: Increased Nutrient Needs;  Food and Nutrition Related Knowledge Deficit;  Severe acute on chronic malnutrition    Nutrition Diagnosis is: [x] ongoing  [] resolved [] not applicable   Being addressed with liberalized PO diet, micronutrient supplementation, and oral nutrition supplements     Nutrition Care Plan:  [x] In Progress  [] Achieved  [] Not applicable    New Nutrition Diagnosis: [x] Not applicable    Nutrition Interventions:     Education Provided   [] Yes:  [x] No: pt with AMS     Recommendations:      1) Continue Regular diet with Nepro 1x/day and Ensure Pudding 3x/day.  2) Continue Nephro-celeste, Vitamin D, and Folic acid if no medical contraindications as medically appropriate.  3) Please continue to assist pt with meals, and encourage PO intake as able.  4) RD to follow up with results of Calorie Count when complete.  5) Monitor PO intake, GI tolerance, skin integrity, labs, weight.  6) malnutrition alert in chart     Monitoring and Evaluation:   Continue to monitor nutritional intake, tolerance to diet prescription, weights, labs, skin integrity    RD remains available upon request and will follow up per protocol  Maribel Gifford, DONN, CDN Pager #[phone].

## 2022-01-07 NOTE — PROGRESS NOTE ADULT - ASSESSMENT
Assessment  DMT2: 71y Male with DM T2 with hyperglycemia, A1C 6.4%, was on insulin at home, now on insulin coverage only, blood sugars are stable and trending within acceptable range, no hypoglycemias, off isolation now, for possible peg next week.  Hypothyroidism: Patient has no history thyroid disease, was not on any thyroid supplements, subclinical with low-normal FT4, lethargic, started on synthroid 25 mcg po daily, repeat FT4 trending down, increased to synthroid  50 mcg po daily, FT4 improving.  Hypercalcemia: Started on Sensipar 60mg daily, Ca fluctuating/remains elevated..  CHF: on medications, stable, monitored.  HTN: Controlled,  on antihypertensive medications.  Parkinsons: on meds, monitored.  CKD: Monitor labs/BMP      Dr Ayers  Cell : [phone]

## 2022-01-08 NOTE — PROGRESS NOTE ADULT - ASSESSMENT
Assessment  DMT2: 71y Male with DM T2 with hyperglycemia, A1C 6.4%, was on insulin at home, now on insulin coverage only, blood sugars are stable and trending within acceptable range, no hypoglycemias, off isolation now, for possible peg next week.  Hypothyroidism: Patient has no history thyroid disease, was not on any thyroid supplements, subclinical with low-normal FT4, lethargic, started on synthroid 25 mcg po daily, repeat FT4 trending down, increased to synthroid  50 mcg po daily, FT4 improving.  Hypercalcemia: Started on Sensipar 60mg daily, Ca fluctuating/remains elevated..  CHF: on medications, stable, monitored.  HTN: Controlled,  on antihypertensive medications.  Parkinsons: on meds, monitored.  CKD: Monitor labs/BMP      Dr Ayers  Cell : [phone]

## 2022-01-08 NOTE — PROGRESS NOTE ADULT - ASSESSMENT
72yo M w/ PMHx of CAD (s/p CABG 2019), CKD (unknown stage), DM2, Parkinson's Disease, HTN, depression presents with new onset acute heart failure exacerbation, NSTEMI, started on hep gtt, developed bl RP bleed, acute anemia. sp MICU course for hemorrhagic shock, 10/28 sp IR embolization l4 and l5 lumbar artery. sp permacath and AVF.    # Acute on chronic systolic and diastolic heart failure  # NSTEMI, medical management  # ESRD, new HD  # Atrial flutter  # acute hypoxic resp failure  # hemorrhagic shock, left RP hematoma, acute blood loss anemia sp embolization l4 and l5 lumbar artery 10/28  # lupus anticoagulant +  HD via permacath per renal, midodrine during dialysis  sp L AVF   12/13 pt pulled out permacath, replaced with new permacath 12/14  per renal, AVF needs more time to mature    # Parkinson's disease   # delirium  cont on depakote and cymbalta  c/w trihexyphenidyl, carbidopa/levodopa   decrease seroquel 50mg qhs and monitor appetite    # DM2 (diabetes mellitus, type 2)  per endo  hba1c 6.4    # CAD (coronary artery disease)  # HTN  lopressor, atorvastatin  asa on hold    # FTT  no improvement on Remeron  son HCP is interested in artificial nutrition, GI evaluated but pt was covid pos, now off quarantine  cont supplement shakes  possible PEG early next week pending scheduling, [facility]GI    # surveillance COVID positive 12/28  asymptomatic off isolation  sating well on RA    DVT ppx hsq    PCP Dr. Simons    DNR/DNI    [facility]  [phone]

## 2022-01-08 NOTE — PROGRESS NOTE ADULT - SUBJECTIVE AND OBJECTIVE BOX
Chief Complaint:  Patient is a 71y old  Male who presents with a chief complaint of leg swelling (08 Jan 2022 13:00)      Date of service 01-08-22 @ 14:09      Interval Events: no events    Hospital Medications:  acetaminophen     Tablet .. 650 milliGRAM(s) Oral every 6 hours PRN  albuterol/ipratropium for Nebulization 3 milliLiter(s) Nebulizer every 6 hours PRN  atorvastatin 80 milliGRAM(s) Oral at bedtime  carbidopa/levodopa  25/100 2.5 Tablet(s) Oral <User Schedule>  carbidopa/levodopa  25/100 2 Tablet(s) Oral <User Schedule>  chlorhexidine 4% Liquid 1 Application(s) Topical <User Schedule>  cholecalciferol 1000 Unit(s) Oral daily  cinacalcet 60 milliGRAM(s) Oral daily  dextrose 40% Gel 15 Gram(s) Oral once  dextrose 5%. 1000 milliLiter(s) IV Continuous <Continuous>  dextrose 5%. 1000 milliLiter(s) IV Continuous <Continuous>  dextrose 50% Injectable 25 Gram(s) IV Push once  dextrose 50% Injectable 12.5 Gram(s) IV Push once  dextrose 50% Injectable 25 Gram(s) IV Push once  diVALproex Sprinkle 125 milliGRAM(s) Oral every 8 hours PRN  diVALproex Sprinkle 250 milliGRAM(s) Oral three times a day  DULoxetine 20 milliGRAM(s) Oral daily  folic acid 1 milliGRAM(s) Oral daily  glucagon  Injectable 1 milliGRAM(s) IntraMuscular once  guaiFENesin Oral Liquid (Sugar-Free) 200 milliGRAM(s) Oral every 6 hours PRN  heparin   Injectable 5000 Unit(s) SubCutaneous every 12 hours  insulin lispro (ADMELOG) corrective regimen sliding scale   SubCutaneous three times a day before meals  insulin lispro (ADMELOG) corrective regimen sliding scale   SubCutaneous at bedtime  levothyroxine 50 MICROGram(s) Oral daily  memantine 5 milliGRAM(s) Oral at bedtime  metoprolol tartrate 50 milliGRAM(s) Oral two times a day  midodrine 10 milliGRAM(s) Oral <User Schedule>  mirtazapine 7.5 milliGRAM(s) Oral daily  Nephro-celeste 1 Tablet(s) Oral daily  ondansetron Injectable 4 milliGRAM(s) IV Push once PRN  polyethylene glycol 3350 17 Gram(s) Oral two times a day  QUEtiapine 50 milliGRAM(s) Oral at bedtime  QUEtiapine 12.5 milliGRAM(s) Oral every 6 hours PRN  senna Syrup 10 milliLiter(s) Oral at bedtime  sodium chloride 0.9% lock flush 10 milliLiter(s) IV Push every 1 hour PRN  trihexyphenidyl 2 milliGRAM(s) Oral three times a day        PHYSICAL EXAM:   Vital Signs:  Vital Signs Last 24 Hrs  T(C): 36.4 (08 Jan 2022 11:01), Max: 36.6 (07 Jan 2022 22:05)  T(F): 97.6 (08 Jan 2022 11:01), Max: 97.8 (07 Jan 2022 22:05)  HR: 73 (08 Jan 2022 11:01) (73 - 104)  BP: 114/65 (08 Jan 2022 11:01) (114/65 - 117/79)  BP(mean): --  RR: 18 (08 Jan 2022 11:01) (18 - 18)  SpO2: 96% (08 Jan 2022 11:01) (94% - 97%)  Daily     Daily       PHYSICAL EXAM:     GENERAL:  Appears stated age, well-groomed, well-nourished, no distress  HEENT:  NC/AT,  conjunctivae anicteric, clear and pink,   NECK: supple, trachea midline  CHEST:  Full & symmetric excursion, no increased effort, breath sounds clear  HEART:  Regular rhythm, no JVD  ABDOMEN:  Soft, non-tender, non-distended, normoactive bowel sounds,  no masses , no hepatosplenomegaly  EXTREMITIES:  no cyanosis,clubbing or edema  SKIN:  No rash, erythema, or, ecchymoses, no jaundice  NEURO:  confused non-focal, no asterixis  PSYCH: flat affect  RECTAL: Deferred      LABS Personally reviewed by me:                        10.5   8.73  )-----------( 221      ( 06 Jan 2022 15:45 )             34.7       01-07    135  |  101  |  29<H>  ----------------------------<  130<H>  4.3   |  19<L>  |  3.06<H>    Ca    10.1      07 Jan 2022 12:38                                    10.5   8.73  )-----------( 221      ( 06 Jan 2022 15:45 )             34.7       Imaging personally reviewed by me:

## 2022-01-08 NOTE — PROGRESS NOTE ADULT - SUBJECTIVE AND OBJECTIVE BOX
Patient seen and examined in the bed. receiving hemodialysis without complication- no UF .     REVIEW OF SYSTEMS:  As per HPI, otherwise 8 full 10 ROS were unremarkable    MEDICATIONS  (STANDING):  atorvastatin 80 milliGRAM(s) Oral at bedtime  carbidopa/levodopa  25/100 2.5 Tablet(s) Oral <User Schedule>  carbidopa/levodopa  25/100 2 Tablet(s) Oral <User Schedule>  chlorhexidine 4% Liquid 1 Application(s) Topical <User Schedule>  cholecalciferol 1000 Unit(s) Oral daily  cinacalcet 60 milliGRAM(s) Oral daily  dextrose 40% Gel 15 Gram(s) Oral once  dextrose 5%. 1000 milliLiter(s) (50 mL/Hr) IV Continuous <Continuous>  dextrose 5%. 1000 milliLiter(s) (100 mL/Hr) IV Continuous <Continuous>  dextrose 50% Injectable 25 Gram(s) IV Push once  dextrose 50% Injectable 12.5 Gram(s) IV Push once  dextrose 50% Injectable 25 Gram(s) IV Push once  diVALproex Sprinkle 250 milliGRAM(s) Oral three times a day  DULoxetine 20 milliGRAM(s) Oral daily  folic acid 1 milliGRAM(s) Oral daily  glucagon  Injectable 1 milliGRAM(s) IntraMuscular once  heparin   Injectable 5000 Unit(s) SubCutaneous every 12 hours  insulin lispro (ADMELOG) corrective regimen sliding scale   SubCutaneous three times a day before meals  insulin lispro (ADMELOG) corrective regimen sliding scale   SubCutaneous at bedtime  levothyroxine 50 MICROGram(s) Oral daily  memantine 5 milliGRAM(s) Oral at bedtime  metoprolol tartrate 50 milliGRAM(s) Oral two times a day  midodrine 10 milliGRAM(s) Oral <User Schedule>  mirtazapine 7.5 milliGRAM(s) Oral daily  Nephro-celeste 1 Tablet(s) Oral daily  polyethylene glycol 3350 17 Gram(s) Oral two times a day  QUEtiapine 50 milliGRAM(s) Oral at bedtime  senna Syrup 10 milliLiter(s) Oral at bedtime  trihexyphenidyl 2 milliGRAM(s) Oral three times a day      VITAL:  T(C): , Max: 36.6 (01-07-22 @ 22:05)  T(F): , Max: 97.8 (01-07-22 @ 22:05)  HR: 73 (01-08-22 @ 11:01)  BP: 114/65 (01-08-22 @ 11:01)  BP(mean): --  RR: 18 (01-08-22 @ 11:01)  SpO2: 96% (01-08-22 @ 11:01)  Wt(kg): --    I and O's:    01-07 @ 07:01  -  01-08 @ 07:00  --------------------------------------------------------  IN: 240 mL / OUT: 0 mL / NET: 240 mL    01-08 @ 07:01  -  01-08 @ 20:04  --------------------------------------------------------  IN: 50 mL / OUT: 0 mL / NET: 50 mL          PHYSICAL EXAM:    Constitutional: NAD  Neck:  No JVD  Respiratory: CTAB/L  Cardiovascular: S1 and S2  Gastrointestinal: BS+, soft, NT/ND  Extremities: No peripheral edema  Neurological: A   : No Javier  Skin: No rashes  Access: avf and perm cath       LABS:                        9.7    8.26  )-----------( 230      ( 08 Jan 2022 14:07 )             32.9     01-08    137  |  99  |  54<H>  ----------------------------<  124<H>  4.9   |  25  |  4.67<H>    Ca    10.3      08 Jan 2022 14:07

## 2022-01-08 NOTE — PROGRESS NOTE ADULT - SUBJECTIVE AND OBJECTIVE BOX
Patient is a 71y old  Male who presents with a chief complaint of leg swelling (08 Jan 2022 12:32)      SUBJECTIVE / OVERNIGHT EVENTS:    Patient seen and examined. ate some oatmeal and drank some nepro this AM. sleeping calmly on arrival.      Vital Signs Last 24 Hrs  T(C): 36.4 (08 Jan 2022 11:01), Max: 36.6 (07 Jan 2022 22:05)  T(F): 97.6 (08 Jan 2022 11:01), Max: 97.8 (07 Jan 2022 22:05)  HR: 73 (08 Jan 2022 11:01) (73 - 104)  BP: 114/65 (08 Jan 2022 11:01) (114/65 - 117/79)  BP(mean): --  RR: 18 (08 Jan 2022 11:01) (18 - 18)  SpO2: 96% (08 Jan 2022 11:01) (94% - 97%)  I&O's Summary    07 Jan 2022 07:01  -  08 Jan 2022 07:00  --------------------------------------------------------  IN: 240 mL / OUT: 0 mL / NET: 240 mL    08 Jan 2022 07:01  -  08 Jan 2022 13:00  --------------------------------------------------------  IN: 50 mL / OUT: 0 mL / NET: 50 mL        PE:  GENERAL: NAD, AAOx1, frail  HEAD:  Atraumatic, Normocephalic  CHEST/LUNG: CTABL, No wheeze, right permacath  HEART: Regular rate and rhythm; no murmur  ABDOMEN: Soft, Nontender, Nondistended; Bowel sounds present  EXTREMITIES:  2+ Peripheral Pulses, No clubbing, cyanosis, or edema  SKIN: left forearm avf  NEURO: No focal deficits    LABS:                        10.5   8.73  )-----------( 221      ( 06 Jan 2022 15:45 )             34.7     01-07    135  |  101  |  29<H>  ----------------------------<  130<H>  4.3   |  19<L>  |  3.06<H>    Ca    10.1      07 Jan 2022 12:38        CAPILLARY BLOOD GLUCOSE      POCT Blood Glucose.: 113 mg/dL (08 Jan 2022 11:32)  POCT Blood Glucose.: 100 mg/dL (08 Jan 2022 07:22)  POCT Blood Glucose.: 119 mg/dL (07 Jan 2022 23:01)  POCT Blood Glucose.: 111 mg/dL (07 Jan 2022 17:08)            RADIOLOGY & ADDITIONAL TESTS:    Imaging Personally Reviewed:  [x] YES  [ ] NO    Consultant(s) Notes Reviewed:  [x] YES  [ ] NO    MEDICATIONS  (STANDING):  atorvastatin 80 milliGRAM(s) Oral at bedtime  carbidopa/levodopa  25/100 2.5 Tablet(s) Oral <User Schedule>  carbidopa/levodopa  25/100 2 Tablet(s) Oral <User Schedule>  chlorhexidine 4% Liquid 1 Application(s) Topical <User Schedule>  cholecalciferol 1000 Unit(s) Oral daily  cinacalcet 60 milliGRAM(s) Oral daily  dextrose 40% Gel 15 Gram(s) Oral once  dextrose 5%. 1000 milliLiter(s) (50 mL/Hr) IV Continuous <Continuous>  dextrose 5%. 1000 milliLiter(s) (100 mL/Hr) IV Continuous <Continuous>  dextrose 50% Injectable 25 Gram(s) IV Push once  dextrose 50% Injectable 12.5 Gram(s) IV Push once  dextrose 50% Injectable 25 Gram(s) IV Push once  diVALproex Sprinkle 250 milliGRAM(s) Oral three times a day  DULoxetine 20 milliGRAM(s) Oral daily  folic acid 1 milliGRAM(s) Oral daily  glucagon  Injectable 1 milliGRAM(s) IntraMuscular once  heparin   Injectable 5000 Unit(s) SubCutaneous every 12 hours  insulin lispro (ADMELOG) corrective regimen sliding scale   SubCutaneous three times a day before meals  insulin lispro (ADMELOG) corrective regimen sliding scale   SubCutaneous at bedtime  levothyroxine 50 MICROGram(s) Oral daily  memantine 5 milliGRAM(s) Oral at bedtime  metoprolol tartrate 50 milliGRAM(s) Oral two times a day  midodrine 10 milliGRAM(s) Oral <User Schedule>  mirtazapine 7.5 milliGRAM(s) Oral daily  Nephro-celeste 1 Tablet(s) Oral daily  polyethylene glycol 3350 17 Gram(s) Oral two times a day  QUEtiapine 50 milliGRAM(s) Oral at bedtime  senna Syrup 10 milliLiter(s) Oral at bedtime  trihexyphenidyl 2 milliGRAM(s) Oral three times a day    MEDICATIONS  (PRN):  acetaminophen     Tablet .. 650 milliGRAM(s) Oral every 6 hours PRN Mild Pain (1 - 3)  albuterol/ipratropium for Nebulization 3 milliLiter(s) Nebulizer every 6 hours PRN Shortness of Breath and/or Wheezing  diVALproex Sprinkle 125 milliGRAM(s) Oral every 8 hours PRN Agitation  guaiFENesin Oral Liquid (Sugar-Free) 200 milliGRAM(s) Oral every 6 hours PRN Cough  ondansetron Injectable 4 milliGRAM(s) IV Push once PRN Nausea and/or Vomiting  QUEtiapine 12.5 milliGRAM(s) Oral every 6 hours PRN agitation  sodium chloride 0.9% lock flush 10 milliLiter(s) IV Push every 1 hour PRN Pre/post blood products, medications, blood draw, and to maintain line patency      Care Discussed with Consultants/Other Providers [x] YES  [ ] NO    HEALTH ISSUES - PROBLEM Dx:  Acute heart failure    Atrial flutter    DM2 (diabetes mellitus, type 2)    CAD (coronary artery disease)    Parkinsons disease    Acute on chronic renal failure    NSTEMI (non-ST elevation myocardial infarction)    Hypertension    Acute kidney injury superimposed on CKD    Anemia    Hypothyroidism    Delirium    Advanced care planning/counseling discussion    Palliative care status    Palliative care encounter    Pain    Stage 5 chronic kidney disease not on chronic dialysis    Hypercalcemia    Preop cardiovascular exam

## 2022-01-08 NOTE — PROGRESS NOTE ADULT - SUBJECTIVE AND OBJECTIVE BOX
Endocrinology Attending Covering for Dr. Reece      Chief complaint  Patient is a 71y old  Male who presents with a chief complaint of leg swelling (07 Jan 2022 14:13)   Review of systems  Patient in bed, looks comfortable, no fever,  had no hypoglycemia.    Labs and Fingersticks  CAPILLARY BLOOD GLUCOSE      POCT Blood Glucose.: 113 mg/dL (08 Jan 2022 11:32)  POCT Blood Glucose.: 100 mg/dL (08 Jan 2022 07:22)  POCT Blood Glucose.: 119 mg/dL (07 Jan 2022 23:01)  POCT Blood Glucose.: 111 mg/dL (07 Jan 2022 17:08)      Anion Gap, Serum: 15 (01-07 @ 12:38)  Anion Gap, Serum: 16 (01-06 @ 15:45)      Calcium, Total Serum: 10.1 (01-07 @ 12:38)  Calcium, Total Serum: 10.3 (01-06 @ 15:45)          01-07    135  |  101  |  29<H>  ----------------------------<  130<H>  4.3   |  19<L>  |  3.06<H>    Ca    10.1      07 Jan 2022 12:38                          10.5   8.73  )-----------( 221      ( 06 Jan 2022 15:45 )             34.7     Medications  MEDICATIONS  (STANDING):  atorvastatin 80 milliGRAM(s) Oral at bedtime  carbidopa/levodopa  25/100 2.5 Tablet(s) Oral <User Schedule>  carbidopa/levodopa  25/100 2 Tablet(s) Oral <User Schedule>  chlorhexidine 4% Liquid 1 Application(s) Topical <User Schedule>  cholecalciferol 1000 Unit(s) Oral daily  cinacalcet 60 milliGRAM(s) Oral daily  dextrose 40% Gel 15 Gram(s) Oral once  dextrose 5%. 1000 milliLiter(s) (50 mL/Hr) IV Continuous <Continuous>  dextrose 5%. 1000 milliLiter(s) (100 mL/Hr) IV Continuous <Continuous>  dextrose 50% Injectable 25 Gram(s) IV Push once  dextrose 50% Injectable 12.5 Gram(s) IV Push once  dextrose 50% Injectable 25 Gram(s) IV Push once  diVALproex Sprinkle 250 milliGRAM(s) Oral three times a day  DULoxetine 20 milliGRAM(s) Oral daily  folic acid 1 milliGRAM(s) Oral daily  glucagon  Injectable 1 milliGRAM(s) IntraMuscular once  heparin   Injectable 5000 Unit(s) SubCutaneous every 12 hours  insulin lispro (ADMELOG) corrective regimen sliding scale   SubCutaneous three times a day before meals  insulin lispro (ADMELOG) corrective regimen sliding scale   SubCutaneous at bedtime  levothyroxine 50 MICROGram(s) Oral daily  memantine 5 milliGRAM(s) Oral at bedtime  metoprolol tartrate 50 milliGRAM(s) Oral two times a day  midodrine 10 milliGRAM(s) Oral <User Schedule>  mirtazapine 7.5 milliGRAM(s) Oral daily  Nephro-celeste 1 Tablet(s) Oral daily  polyethylene glycol 3350 17 Gram(s) Oral two times a day  QUEtiapine 50 milliGRAM(s) Oral at bedtime  senna Syrup 10 milliLiter(s) Oral at bedtime  trihexyphenidyl 2 milliGRAM(s) Oral three times a day      Physical Exam  General: Patient comfortable in bed  Vital Signs Last 12 Hrs  T(F): 97.6 (01-08-22 @ 11:01), Max: 97.6 (01-08-22 @ 11:01)  HR: 73 (01-08-22 @ 11:01) (73 - 98)  BP: 114/65 (01-08-22 @ 11:01) (114/65 - 117/79)  BP(mean): --  RR: 18 (01-08-22 @ 11:01) (18 - 18)  SpO2: 96% (01-08-22 @ 11:01) (94% - 96%)  Neck: No palpable thyroid nodules.  CVS: S1S2, No murmurs  Respiratory: No wheezing, no crepitations  GI: Abdomen soft, bowel sounds positive  Musculoskeletal:  edema lower extremities.   Skin: No skin rashes, no ecchymosis    Diagnostics

## 2022-01-08 NOTE — PROGRESS NOTE ADULT - ASSESSMENT
70yo M w/ PMHx of CAD (s/p CABG 2019), CKD (unknown stage), DM2, Parkinson's Disease, HTN, depression presents with bilateral leg swelling  ESRD   Severe LV dysfunction; A flutter    + COVID      1 Palliation - Intermittent confusion ;   Seroquel 50mg po qHS  2 Renal- dependent on hemodialysis        Today hemodialysis with no UF       BP support with Midodrine 10 mg   3 CVS- Lopressor for heart rate control   4 Anemia - s/p Retacrit 4000 units x1 dose-  hgb <goal today  5 Vasc - s/p  LUE AVF--Immature (this will take weeks to months to mature) ;  If he stays longer then will try for BAM maturation; Perm cath   6 Endo - Off the lantus at present due to hypoglycemia   7 GI - on Remeron and eating more now

## 2022-01-09 NOTE — PROGRESS NOTE ADULT - SUBJECTIVE AND OBJECTIVE BOX
Patient is a 71y old  Male who presents with a chief complaint of leg swelling (08 Jan 2022 20:04)      SUBJECTIVE / OVERNIGHT EVENTS:    Patient seen and examined. no acute events.      Vital Signs Last 24 Hrs  T(C): 35.9 (09 Jan 2022 04:42), Max: 36.6 (08 Jan 2022 18:45)  T(F): 96.7 (09 Jan 2022 04:42), Max: 97.8 (08 Jan 2022 18:45)  HR: 85 (09 Jan 2022 04:42) (73 - 118)  BP: 103/61 (09 Jan 2022 04:42) (103/61 - 115/77)  BP(mean): --  RR: 18 (09 Jan 2022 04:42) (18 - 18)  SpO2: 99% (09 Jan 2022 04:42) (96% - 100%)  I&O's Summary    08 Jan 2022 07:01  -  09 Jan 2022 07:00  --------------------------------------------------------  IN: 500 mL / OUT: 350 mL / NET: 150 mL        PE:  GENERAL: NAD, AAOx1, frail  HEAD:  Atraumatic, Normocephalic  CHEST/LUNG: CTABL, No wheeze, right permacath  HEART: Regular rate and rhythm; no murmur  ABDOMEN: Soft, Nontender, Nondistended; Bowel sounds present  EXTREMITIES:  2+ Peripheral Pulses, No clubbing, cyanosis, or edema  SKIN: left forearm avf  NEURO: No focal deficits    LABS:                        9.7    8.26  )-----------( 230      ( 08 Jan 2022 14:07 )             32.9     01-08    137  |  99  |  54<H>  ----------------------------<  124<H>  4.9   |  25  |  4.67<H>    Ca    10.3      08 Jan 2022 14:07        CAPILLARY BLOOD GLUCOSE      POCT Blood Glucose.: 112 mg/dL (09 Jan 2022 08:39)  POCT Blood Glucose.: 97 mg/dL (08 Jan 2022 22:21)  POCT Blood Glucose.: 117 mg/dL (08 Jan 2022 16:57)  POCT Blood Glucose.: 113 mg/dL (08 Jan 2022 11:32)            RADIOLOGY & ADDITIONAL TESTS:    Imaging Personally Reviewed:  [x] YES  [ ] NO    Consultant(s) Notes Reviewed:  [x] YES  [ ] NO    MEDICATIONS  (STANDING):  atorvastatin 80 milliGRAM(s) Oral at bedtime  carbidopa/levodopa  25/100 2.5 Tablet(s) Oral <User Schedule>  carbidopa/levodopa  25/100 2 Tablet(s) Oral <User Schedule>  chlorhexidine 4% Liquid 1 Application(s) Topical <User Schedule>  cholecalciferol 1000 Unit(s) Oral daily  cinacalcet 60 milliGRAM(s) Oral daily  dextrose 40% Gel 15 Gram(s) Oral once  dextrose 5%. 1000 milliLiter(s) (50 mL/Hr) IV Continuous <Continuous>  dextrose 5%. 1000 milliLiter(s) (100 mL/Hr) IV Continuous <Continuous>  dextrose 50% Injectable 25 Gram(s) IV Push once  dextrose 50% Injectable 12.5 Gram(s) IV Push once  dextrose 50% Injectable 25 Gram(s) IV Push once  diVALproex Sprinkle 250 milliGRAM(s) Oral three times a day  DULoxetine 20 milliGRAM(s) Oral daily  folic acid 1 milliGRAM(s) Oral daily  glucagon  Injectable 1 milliGRAM(s) IntraMuscular once  heparin   Injectable 5000 Unit(s) SubCutaneous every 12 hours  insulin lispro (ADMELOG) corrective regimen sliding scale   SubCutaneous three times a day before meals  insulin lispro (ADMELOG) corrective regimen sliding scale   SubCutaneous at bedtime  levothyroxine 50 MICROGram(s) Oral daily  memantine 5 milliGRAM(s) Oral at bedtime  metoprolol tartrate 50 milliGRAM(s) Oral two times a day  midodrine 10 milliGRAM(s) Oral <User Schedule>  mirtazapine 7.5 milliGRAM(s) Oral daily  Nephro-celeste 1 Tablet(s) Oral daily  polyethylene glycol 3350 17 Gram(s) Oral two times a day  QUEtiapine 50 milliGRAM(s) Oral at bedtime  senna Syrup 10 milliLiter(s) Oral at bedtime  trihexyphenidyl 2 milliGRAM(s) Oral three times a day    MEDICATIONS  (PRN):  acetaminophen     Tablet .. 650 milliGRAM(s) Oral every 6 hours PRN Mild Pain (1 - 3)  albuterol/ipratropium for Nebulization 3 milliLiter(s) Nebulizer every 6 hours PRN Shortness of Breath and/or Wheezing  diVALproex Sprinkle 125 milliGRAM(s) Oral every 8 hours PRN Agitation  guaiFENesin Oral Liquid (Sugar-Free) 200 milliGRAM(s) Oral every 6 hours PRN Cough  ondansetron Injectable 4 milliGRAM(s) IV Push once PRN Nausea and/or Vomiting  QUEtiapine 12.5 milliGRAM(s) Oral every 6 hours PRN agitation  sodium chloride 0.9% lock flush 10 milliLiter(s) IV Push every 1 hour PRN Pre/post blood products, medications, blood draw, and to maintain line patency      Care Discussed with Consultants/Other Providers [x] YES  [ ] NO    HEALTH ISSUES - PROBLEM Dx:  Acute heart failure    Atrial flutter    DM2 (diabetes mellitus, type 2)    CAD (coronary artery disease)    Parkinsons disease    Acute on chronic renal failure    NSTEMI (non-ST elevation myocardial infarction)    Hypertension    Acute kidney injury superimposed on CKD    Anemia    Hypothyroidism    Delirium    Advanced care planning/counseling discussion    Palliative care status    Palliative care encounter    Pain    Stage 5 chronic kidney disease not on chronic dialysis    Hypercalcemia    Preop cardiovascular exam

## 2022-01-09 NOTE — PROGRESS NOTE ADULT - PROBLEM SELECTOR PLAN 1
Will continue coverage scale ac/hs. Will continue monitoring FS and FU.  Suggest DC on Tradjenta 5mg daily, discontinue prior hypoglycemic agents/insulin, endo FU 4 weeks.

## 2022-01-09 NOTE — PROGRESS NOTE ADULT - ASSESSMENT
70yo M w/ PMHx of CAD (s/p CABG 2019), CKD (unknown stage), DM2, Parkinson's Disease, HTN, depression presents with new onset acute heart failure exacerbation, NSTEMI, started on hep gtt, developed bl RP bleed, acute anemia. sp MICU course for hemorrhagic shock, 10/28 sp IR embolization l4 and l5 lumbar artery. sp permacath and AVF.    # Acute on chronic systolic and diastolic heart failure  # NSTEMI, medical management  # ESRD, new HD  # Atrial flutter  # acute hypoxic resp failure  # hemorrhagic shock, left RP hematoma, acute blood loss anemia sp embolization l4 and l5 lumbar artery 10/28  # lupus anticoagulant +  HD via permacath per renal, midodrine during dialysis  sp L AVF   12/13 pt pulled out permacath, replaced with new permacath 12/14  per renal, AVF needs more time to mature    # Parkinson's disease   # delirium  cont on depakote and cymbalta  c/w trihexyphenidyl, carbidopa/levodopa   decrease seroquel 50mg qhs and monitor appetite    # DM2 (diabetes mellitus, type 2)  per endo  hba1c 6.4    # CAD (coronary artery disease)  # HTN  lopressor, atorvastatin  asa on hold    # FTT  no improvement on Remeron  son HCP is interested in artificial nutrition, GI evaluated but pt was covid pos, now off quarantine  cont supplement shakes  FU GI possible PEG tomorrow    # surveillance COVID positive 12/28  asymptomatic off isolation  sating well on RA    DVT ppx hsq    PCP Dr. Simons    DNR/DNI    [facility]  [phone]   70yo M w/ PMHx of CAD (s/p CABG 2019), CKD (unknown stage), DM2, Parkinson's Disease, HTN, depression presents with new onset acute heart failure exacerbation, NSTEMI, started on hep gtt, developed bl RP bleed, acute anemia. sp MICU course for hemorrhagic shock, 10/28 sp IR embolization l4 and l5 lumbar artery. sp permacath and AVF.    # Acute on chronic systolic and diastolic heart failure  # NSTEMI, medical management  # ESRD, new HD  # Atrial flutter  # acute hypoxic resp failure  # hemorrhagic shock, left RP hematoma, acute blood loss anemia sp embolization l4 and l5 lumbar artery 10/28  # lupus anticoagulant +  HD via permacath per renal, midodrine during dialysis  sp L AVF   12/13 pt pulled out permacath, replaced with new permacath 12/14  per renal, AVF needs more time to mature    # Parkinson's disease   # delirium  cont on depakote and cymbalta  c/w trihexyphenidyl, carbidopa/levodopa   decrease seroquel 50mg qhs and monitor appetite    # DM2 (diabetes mellitus, type 2)  per endo  hba1c 6.4    # CAD (coronary artery disease)  # HTN  lopressor, atorvastatin  asa on hold    # FTT  no improvement on Remeron  son HCP is interested in artificial nutrition, GI evaluated but pt was covid pos, now off quarantine  cont supplement shakes  FU GI possible PEG tomorrow    # surveillance COVID positive 12/28  asymptomatic off isolation  sating well on RA  unclear why d dimer and ferritin ordered for 1/9 labs, given bed bound and covid, will check doppler LE ro dvt    DVT ppx hsq    PCP Dr. Simons    DNR/DNI    [facility]  [phone]

## 2022-01-09 NOTE — PROGRESS NOTE ADULT - ASSESSMENT
Assessment  DMT2: 71y Male with DM T2 with hyperglycemia, A1C 6.4%, was on insulin at home, now on insulin coverage only, blood sugars are stable and trending within acceptable range, no hypoglycemias, off isolation now, NAD, planning peg placement tomorrow.  Hypothyroidism: Patient has no history thyroid disease, was not on any thyroid supplements, subclinical with low-normal FT4, lethargic, started on synthroid 25 mcg po daily, repeat FT4 trending down, increased to synthroid  50 mcg po daily, FT4 improving.  Hypercalcemia: Started on Sensipar 60mg daily, Ca fluctuating/remains elevated..  CHF: on medications, stable, monitored.  HTN: Controlled,  on antihypertensive medications.  Parkinsons: on meds, monitored.  CKD: Monitor labs/BMP      Dr Ayers  Cell : [phone]

## 2022-01-09 NOTE — PROGRESS NOTE ADULT - SUBJECTIVE AND OBJECTIVE BOX
Chief complaint  Patient is a 71y old  Male who presents with a chief complaint of leg swelling (09 Jan 2022 09:34)   Review of systems  Patient in bed, looks comfortable, no hypoglycemic episodes.    Labs and Fingersticks  CAPILLARY BLOOD GLUCOSE      POCT Blood Glucose.: 126 mg/dL (09 Jan 2022 12:12)  POCT Blood Glucose.: 112 mg/dL (09 Jan 2022 08:39)  POCT Blood Glucose.: 97 mg/dL (08 Jan 2022 22:21)  POCT Blood Glucose.: 117 mg/dL (08 Jan 2022 16:57)      Anion Gap, Serum: 16 (01-09 @ 07:15)  Anion Gap, Serum: 13 (01-08 @ 14:07)      Calcium, Total Serum: 9.8 (01-09 @ 07:15)  Calcium, Total Serum: 10.3 (01-08 @ 14:07)  Albumin, Serum: 2.3 *L* (01-09 @ 07:15)    Alanine Aminotransferase (ALT/SGPT): <5 *L* (01-09 @ 07:15)  Alkaline Phosphatase, Serum: 83 (01-09 @ 07:15)  Aspartate Aminotransferase (AST/SGOT): 37 (01-09 @ 07:15)        01-09    137  |  98  |  34<H>  ----------------------------<  105<H>  3.9   |  23  |  3.39<H>    Ca    9.8      09 Jan 2022 07:15    TPro  8.3  /  Alb  2.3<L>  /  TBili  0.6  /  DBili  0.2  /  AST  37  /  ALT  <5<L>  /  AlkPhos  83  01-09                        9.7    8.26  )-----------( 230      ( 08 Jan 2022 14:07 )             32.9     Medications  MEDICATIONS  (STANDING):  atorvastatin 80 milliGRAM(s) Oral at bedtime  carbidopa/levodopa  25/100 2.5 Tablet(s) Oral <User Schedule>  carbidopa/levodopa  25/100 2 Tablet(s) Oral <User Schedule>  chlorhexidine 4% Liquid 1 Application(s) Topical <User Schedule>  cholecalciferol 1000 Unit(s) Oral daily  cinacalcet 60 milliGRAM(s) Oral daily  dextrose 40% Gel 15 Gram(s) Oral once  dextrose 5%. 1000 milliLiter(s) (50 mL/Hr) IV Continuous <Continuous>  dextrose 5%. 1000 milliLiter(s) (100 mL/Hr) IV Continuous <Continuous>  dextrose 50% Injectable 25 Gram(s) IV Push once  dextrose 50% Injectable 12.5 Gram(s) IV Push once  dextrose 50% Injectable 25 Gram(s) IV Push once  diVALproex Sprinkle 250 milliGRAM(s) Oral three times a day  DULoxetine 20 milliGRAM(s) Oral daily  folic acid 1 milliGRAM(s) Oral daily  glucagon  Injectable 1 milliGRAM(s) IntraMuscular once  heparin   Injectable 5000 Unit(s) SubCutaneous every 12 hours  insulin lispro (ADMELOG) corrective regimen sliding scale   SubCutaneous three times a day before meals  insulin lispro (ADMELOG) corrective regimen sliding scale   SubCutaneous at bedtime  levothyroxine 50 MICROGram(s) Oral daily  memantine 5 milliGRAM(s) Oral at bedtime  metoprolol tartrate 50 milliGRAM(s) Oral two times a day  midodrine 10 milliGRAM(s) Oral <User Schedule>  mirtazapine 7.5 milliGRAM(s) Oral daily  Nephro-celeste 1 Tablet(s) Oral daily  polyethylene glycol 3350 17 Gram(s) Oral two times a day  QUEtiapine 50 milliGRAM(s) Oral at bedtime  senna Syrup 10 milliLiter(s) Oral at bedtime  trihexyphenidyl 2 milliGRAM(s) Oral three times a day      Physical Exam  General: Patient comfortable in bed  Vital Signs Last 12 Hrs  T(F): 97.2 (01-09-22 @ 11:33), Max: 97.2 (01-09-22 @ 11:33)  HR: 100 (01-09-22 @ 11:33) (85 - 100)  BP: 99/69 (01-09-22 @ 11:33) (99/69 - 103/61)  BP(mean): --  RR: 17 (01-09-22 @ 11:33) (17 - 18)  SpO2: 97% (01-09-22 @ 11:33) (97% - 99%)  Neck: No palpable thyroid nodules.  CVS: S1S2, No murmurs  Respiratory: No wheezing, no crepitations  GI: Abdomen soft, bowel sounds positive  Musculoskeletal:  edema lower extremities.   Skin: No skin rashes, no ecchymosis    Diagnostics    Free Thyroxine, Serum: AM Sched. Collection: 16-Dec-2021 06:00 (12-15 @ 14:39)

## 2022-01-09 NOTE — PROGRESS NOTE ADULT - ASSESSMENT
71 M w volume overload, GI consulted for drop in hgb    1. CHF per cardio    2. ABBY on CKD per renal    3. Drop in hgb, no overt GI Bleed.      4. RP bleed  s/p Abdominal Angiography and Embolization on 10/29/2021 by Interventional radiology of l4and l5 lumbar artery  consider repeat CTA as h/h not improved    5. Failure to thrive  -for PEG tomorrow    [facility] [place]  Gastroenterology and Hepatology  [address] [place], [place]  Office: [phone]  Cell: [phone]

## 2022-01-09 NOTE — PROGRESS NOTE ADULT - SUBJECTIVE AND OBJECTIVE BOX
Chief Complaint:  Patient is a 71y old  Male who presents with a chief complaint of leg swelling (08 Jan 2022 13:00)      Date of service 01-09-22 @ 14:09      Interval Events: no events    Hospital Medications:  acetaminophen     Tablet .. 650 milliGRAM(s) Oral every 6 hours PRN  albuterol/ipratropium for Nebulization 3 milliLiter(s) Nebulizer every 6 hours PRN  atorvastatin 80 milliGRAM(s) Oral at bedtime  carbidopa/levodopa  25/100 2.5 Tablet(s) Oral <User Schedule>  carbidopa/levodopa  25/100 2 Tablet(s) Oral <User Schedule>  chlorhexidine 4% Liquid 1 Application(s) Topical <User Schedule>  cholecalciferol 1000 Unit(s) Oral daily  cinacalcet 60 milliGRAM(s) Oral daily  dextrose 40% Gel 15 Gram(s) Oral once  dextrose 5%. 1000 milliLiter(s) IV Continuous <Continuous>  dextrose 5%. 1000 milliLiter(s) IV Continuous <Continuous>  dextrose 50% Injectable 25 Gram(s) IV Push once  dextrose 50% Injectable 12.5 Gram(s) IV Push once  dextrose 50% Injectable 25 Gram(s) IV Push once  diVALproex Sprinkle 125 milliGRAM(s) Oral every 8 hours PRN  diVALproex Sprinkle 250 milliGRAM(s) Oral three times a day  DULoxetine 20 milliGRAM(s) Oral daily  folic acid 1 milliGRAM(s) Oral daily  glucagon  Injectable 1 milliGRAM(s) IntraMuscular once  guaiFENesin Oral Liquid (Sugar-Free) 200 milliGRAM(s) Oral every 6 hours PRN  heparin   Injectable 5000 Unit(s) SubCutaneous every 12 hours  insulin lispro (ADMELOG) corrective regimen sliding scale   SubCutaneous three times a day before meals  insulin lispro (ADMELOG) corrective regimen sliding scale   SubCutaneous at bedtime  levothyroxine 50 MICROGram(s) Oral daily  memantine 5 milliGRAM(s) Oral at bedtime  metoprolol tartrate 50 milliGRAM(s) Oral two times a day  midodrine 10 milliGRAM(s) Oral <User Schedule>  mirtazapine 7.5 milliGRAM(s) Oral daily  Nephro-celeste 1 Tablet(s) Oral daily  ondansetron Injectable 4 milliGRAM(s) IV Push once PRN  polyethylene glycol 3350 17 Gram(s) Oral two times a day  QUEtiapine 50 milliGRAM(s) Oral at bedtime  QUEtiapine 12.5 milliGRAM(s) Oral every 6 hours PRN  senna Syrup 10 milliLiter(s) Oral at bedtime  sodium chloride 0.9% lock flush 10 milliLiter(s) IV Push every 1 hour PRN  trihexyphenidyl 2 milliGRAM(s) Oral three times a day        PHYSICAL EXAM:   Vital Signs:  Vital Signs Last 24 Hrs  T(C): 36.4 (08 Jan 2022 11:01), Max: 36.6 (07 Jan 2022 22:05)  T(F): 97.6 (08 Jan 2022 11:01), Max: 97.8 (07 Jan 2022 22:05)  HR: 73 (08 Jan 2022 11:01) (73 - 104)  BP: 114/65 (08 Jan 2022 11:01) (114/65 - 117/79)  BP(mean): --  RR: 18 (08 Jan 2022 11:01) (18 - 18)  SpO2: 96% (08 Jan 2022 11:01) (94% - 97%)  Daily     Daily       PHYSICAL EXAM:     GENERAL:  Appears stated age, well-groomed, well-nourished, no distress  HEENT:  NC/AT,  conjunctivae anicteric, clear and pink,   NECK: supple, trachea midline  CHEST:  Full & symmetric excursion, no increased effort, breath sounds clear  HEART:  Regular rhythm, no JVD  ABDOMEN:  Soft, non-tender, non-distended, normoactive bowel sounds,  no masses , no hepatosplenomegaly  EXTREMITIES:  no cyanosis,clubbing or edema  SKIN:  No rash, erythema, or, ecchymoses, no jaundice  NEURO:  confused non-focal, no asterixis  PSYCH: flat affect  RECTAL: Deferred      LABS Personally reviewed by me:                        10.5   8.73  )-----------( 221      ( 06 Jan 2022 15:45 )             34.7       01-07    135  |  101  |  29<H>  ----------------------------<  130<H>  4.3   |  19<L>  |  3.06<H>    Ca    10.1      07 Jan 2022 12:38                                    10.5   8.73  )-----------( 221      ( 06 Jan 2022 15:45 )             34.7       Imaging personally reviewed by me:

## 2022-01-10 NOTE — PROGRESS NOTE ADULT - SUBJECTIVE AND OBJECTIVE BOX
INTERVAL HPI/OVERNIGHT EVENTS:  Pt seen and examined. No new events/complaints  for PEG today    MEDICATIONS  (STANDING):  atorvastatin 80 milliGRAM(s) Oral at bedtime  carbidopa/levodopa  25/100 2.5 Tablet(s) Oral <User Schedule>  carbidopa/levodopa  25/100 2 Tablet(s) Oral <User Schedule>  chlorhexidine 4% Liquid 1 Application(s) Topical <User Schedule>  cholecalciferol 1000 Unit(s) Oral daily  cinacalcet 60 milliGRAM(s) Oral daily  dextrose 40% Gel 15 Gram(s) Oral once  dextrose 5%. 1000 milliLiter(s) (50 mL/Hr) IV Continuous <Continuous>  dextrose 5%. 1000 milliLiter(s) (100 mL/Hr) IV Continuous <Continuous>  dextrose 50% Injectable 25 Gram(s) IV Push once  dextrose 50% Injectable 12.5 Gram(s) IV Push once  dextrose 50% Injectable 25 Gram(s) IV Push once  diVALproex Sprinkle 250 milliGRAM(s) Oral three times a day  DULoxetine 20 milliGRAM(s) Oral daily  folic acid 1 milliGRAM(s) Oral daily  glucagon  Injectable 1 milliGRAM(s) IntraMuscular once  heparin   Injectable 5000 Unit(s) SubCutaneous every 12 hours  insulin lispro (ADMELOG) corrective regimen sliding scale   SubCutaneous three times a day before meals  insulin lispro (ADMELOG) corrective regimen sliding scale   SubCutaneous at bedtime  levothyroxine Injectable 25 MICROGram(s) IV Push at bedtime  memantine 5 milliGRAM(s) Oral at bedtime  metoprolol tartrate 50 milliGRAM(s) Oral two times a day  midodrine 10 milliGRAM(s) Oral <User Schedule>  mirtazapine 7.5 milliGRAM(s) Oral daily  Nephro-celeste 1 Tablet(s) Oral daily  polyethylene glycol 3350 17 Gram(s) Oral two times a day  QUEtiapine 50 milliGRAM(s) Oral at bedtime  senna Syrup 10 milliLiter(s) Oral at bedtime  trihexyphenidyl 2 milliGRAM(s) Oral three times a day    MEDICATIONS  (PRN):  acetaminophen     Tablet .. 650 milliGRAM(s) Oral every 6 hours PRN Mild Pain (1 - 3)  albuterol/ipratropium for Nebulization 3 milliLiter(s) Nebulizer every 6 hours PRN Shortness of Breath and/or Wheezing  diVALproex Sprinkle 125 milliGRAM(s) Oral every 8 hours PRN Agitation  guaiFENesin Oral Liquid (Sugar-Free) 200 milliGRAM(s) Oral every 6 hours PRN Cough  ondansetron Injectable 4 milliGRAM(s) IV Push once PRN Nausea and/or Vomiting  QUEtiapine 12.5 milliGRAM(s) Oral every 6 hours PRN agitation  sodium chloride 0.9% lock flush 10 milliLiter(s) IV Push every 1 hour PRN Pre/post blood products, medications, blood draw, and to maintain line patency      Allergies    adhesives (Rash)  latex (Urticaria)  No Known Drug Allergies    Intolerances      Vital Signs Last 24 Hrs  T(C): 36.2 (10 Paul 2022 04:28), Max: 36.4 (09 Jan 2022 20:47)  T(F): 97.2 (10 Paul 2022 04:28), Max: 97.6 (09 Jan 2022 20:47)  HR: 113 (10 Paul 2022 04:28) (100 - 113)  BP: 113/73 (10 Paul 2022 04:28) (99/69 - 113/73)  BP(mean): --  RR: 19 (10 Paul 2022 04:28) (17 - 19)  SpO2: 98% (10 Paul 2022 04:28) (97% - 99%)    PHYSICAL EXAM:      GENERAL:  Appears stated age, well-groomed, well-nourished, no distress  HEENT:  NC/AT,  conjunctivae anicteric, clear and pink,   NECK: supple, trachea midline  CHEST:  Full & symmetric excursion, no increased effort, breath sounds clear  HEART:  Regular rhythm, no JVD  ABDOMEN:  Soft, non-tender, non-distended, normoactive bowel sounds,  no masses , no hepatosplenomegaly  EXTREMITIES:  no cyanosis,clubbing or edema  SKIN:  No rash, erythema, or, ecchymoses, no jaundice  NEURO:  confused non-focal, no asterixis  PSYCH: flat affect  RECTAL: Deferred        LABS:                        9.7    8.26  )-----------( 230      ( 08 Jan 2022 14:07 )             32.9     01-10    138  |  98  |  48<H>  ----------------------------<  100<H>  4.1   |  24  |  4.67<H>    Ca    10.4      10 Paul 2022 07:23    TPro  8.3  /  Alb  2.3<L>  /  TBili  0.6  /  DBili  0.2  /  AST  37  /  ALT  <5<L>  /  AlkPhos  83  01-09    PT/INR - ( 10 Paul 2022 07:29 )   PT: 12.1 sec;   INR: 1.01 ratio               RADIOLOGY & ADDITIONAL TESTS:

## 2022-01-10 NOTE — PROGRESS NOTE ADULT - ASSESSMENT
Assessment  DMT2: 71y Male with DM T2 with hyperglycemia, A1C 6.4%, was on insulin at home, now on insulin coverage only, blood sugars are stable and trending within acceptable range, no hypoglycemias, off isolation now, NAD, planning peg placement tomorrow.  Hypothyroidism: Patient has no history thyroid disease, was not on any thyroid supplements, subclinical with low-normal FT4, lethargic, started on synthroid 25 mcg po daily, repeat FT4 trending down, increased to synthroid  50 mcg po daily, FT4 improving.  Hypercalcemia: Started on Sensipar 60mg daily, Ca fluctuating/remains elevated..  CHF: on medications, stable, monitored.  HTN: Controlled,  on antihypertensive medications.  Parkinsons: on meds, monitored.  CKD: Monitor labs/BMP      Dr Ayers  Cell : [phone]

## 2022-01-10 NOTE — PROGRESS NOTE ADULT - SUBJECTIVE AND OBJECTIVE BOX
Patient is a 71y old  Male who presents with a chief complaint of leg swelling (09 Jan 2022 13:16)      SUBJECTIVE / OVERNIGHT EVENTS:    Patient seen and examined. cannot provide ros.       Vital Signs Last 24 Hrs  T(C): 36.2 (10 Paul 2022 04:28), Max: 36.4 (09 Jan 2022 20:47)  T(F): 97.2 (10 Paul 2022 04:28), Max: 97.6 (09 Jan 2022 20:47)  HR: 113 (10 Paul 2022 04:28) (100 - 113)  BP: 113/73 (10 Paul 2022 04:28) (99/69 - 113/73)  BP(mean): --  RR: 19 (10 Paul 2022 04:28) (17 - 19)  SpO2: 98% (10 Paul 2022 04:28) (97% - 99%)  I&O's Summary    09 Jan 2022 07:01  -  10 Paul 2022 07:00  --------------------------------------------------------  IN: 340 mL / OUT: 0 mL / NET: 340 mL        PE:  GENERAL: NAD, AAOx1, frail  HEAD:  Atraumatic, Normocephalic  CHEST/LUNG: CTABL, No wheeze, right permacath  HEART: Regular rate and rhythm; no murmur  ABDOMEN: Soft, Nontender, Nondistended; Bowel sounds present  EXTREMITIES:  2+ Peripheral Pulses, No clubbing, cyanosis, or edema  SKIN: left forearm avf  NEURO: No focal deficits    LABS:                        9.7    8.26  )-----------( 230      ( 08 Jan 2022 14:07 )             32.9     01-10    138  |  98  |  48<H>  ----------------------------<  100<H>  4.1   |  24  |  4.67<H>    Ca    10.4      10 Paul 2022 07:23    TPro  8.3  /  Alb  2.3<L>  /  TBili  0.6  /  DBili  0.2  /  AST  37  /  ALT  <5<L>  /  AlkPhos  83  01-09    PT/INR - ( 10 Paul 2022 07:29 )   PT: 12.1 sec;   INR: 1.01 ratio           CAPILLARY BLOOD GLUCOSE      POCT Blood Glucose.: 103 mg/dL (10 Paul 2022 08:04)  POCT Blood Glucose.: 108 mg/dL (09 Jan 2022 21:06)  POCT Blood Glucose.: 109 mg/dL (09 Jan 2022 16:22)  POCT Blood Glucose.: 126 mg/dL (09 Jan 2022 12:12)  POCT Blood Glucose.: 112 mg/dL (09 Jan 2022 08:39)            RADIOLOGY & ADDITIONAL TESTS:    Imaging Personally Reviewed:  [x] YES  [ ] NO    Consultant(s) Notes Reviewed:  [x] YES  [ ] NO    MEDICATIONS  (STANDING):  atorvastatin 80 milliGRAM(s) Oral at bedtime  carbidopa/levodopa  25/100 2.5 Tablet(s) Oral <User Schedule>  carbidopa/levodopa  25/100 2 Tablet(s) Oral <User Schedule>  chlorhexidine 4% Liquid 1 Application(s) Topical <User Schedule>  cholecalciferol 1000 Unit(s) Oral daily  cinacalcet 60 milliGRAM(s) Oral daily  dextrose 40% Gel 15 Gram(s) Oral once  dextrose 5%. 1000 milliLiter(s) (50 mL/Hr) IV Continuous <Continuous>  dextrose 5%. 1000 milliLiter(s) (100 mL/Hr) IV Continuous <Continuous>  dextrose 50% Injectable 25 Gram(s) IV Push once  dextrose 50% Injectable 12.5 Gram(s) IV Push once  dextrose 50% Injectable 25 Gram(s) IV Push once  diVALproex Sprinkle 250 milliGRAM(s) Oral three times a day  DULoxetine 20 milliGRAM(s) Oral daily  folic acid 1 milliGRAM(s) Oral daily  glucagon  Injectable 1 milliGRAM(s) IntraMuscular once  heparin   Injectable 5000 Unit(s) SubCutaneous every 12 hours  insulin lispro (ADMELOG) corrective regimen sliding scale   SubCutaneous three times a day before meals  insulin lispro (ADMELOG) corrective regimen sliding scale   SubCutaneous at bedtime  levothyroxine Injectable 25 MICROGram(s) IV Push at bedtime  memantine 5 milliGRAM(s) Oral at bedtime  metoprolol tartrate 50 milliGRAM(s) Oral two times a day  midodrine 10 milliGRAM(s) Oral <User Schedule>  mirtazapine 7.5 milliGRAM(s) Oral daily  Nephro-celeste 1 Tablet(s) Oral daily  polyethylene glycol 3350 17 Gram(s) Oral two times a day  QUEtiapine 50 milliGRAM(s) Oral at bedtime  senna Syrup 10 milliLiter(s) Oral at bedtime  trihexyphenidyl 2 milliGRAM(s) Oral three times a day    MEDICATIONS  (PRN):  acetaminophen     Tablet .. 650 milliGRAM(s) Oral every 6 hours PRN Mild Pain (1 - 3)  albuterol/ipratropium for Nebulization 3 milliLiter(s) Nebulizer every 6 hours PRN Shortness of Breath and/or Wheezing  diVALproex Sprinkle 125 milliGRAM(s) Oral every 8 hours PRN Agitation  guaiFENesin Oral Liquid (Sugar-Free) 200 milliGRAM(s) Oral every 6 hours PRN Cough  ondansetron Injectable 4 milliGRAM(s) IV Push once PRN Nausea and/or Vomiting  QUEtiapine 12.5 milliGRAM(s) Oral every 6 hours PRN agitation  sodium chloride 0.9% lock flush 10 milliLiter(s) IV Push every 1 hour PRN Pre/post blood products, medications, blood draw, and to maintain line patency      Care Discussed with Consultants/Other Providers [x] YES  [ ] NO    HEALTH ISSUES - PROBLEM Dx:  Acute heart failure    Atrial flutter    DM2 (diabetes mellitus, type 2)    CAD (coronary artery disease)    Parkinsons disease    Acute on chronic renal failure    NSTEMI (non-ST elevation myocardial infarction)    Hypertension    Acute kidney injury superimposed on CKD    Anemia    Hypothyroidism    Delirium    Advanced care planning/counseling discussion    Palliative care status    Palliative care encounter    Pain    Stage 5 chronic kidney disease not on chronic dialysis    Hypercalcemia    Preop cardiovascular exam

## 2022-01-10 NOTE — PROGRESS NOTE ADULT - SUBJECTIVE AND OBJECTIVE BOX
Confused  LAst HD was Saturday with No UF - 2.5 hours  Intermittently hypotensive and tachycardic       VITAL:  T(C): , Max: 36.4 (01-09-22 @ 20:47)  T(F): , Max: 97.6 (01-09-22 @ 20:47)  HR: 113 (01-10-22 @ 04:28)  BP: 113/73 (01-10-22 @ 04:28)  BP(mean): --  RR: 19 (01-10-22 @ 04:28)  SpO2: 98% (01-10-22 @ 04:28)  Wt(kg): --      PHYSICAL EXAM:  General: Somnolent   HEENT: MMM  Lungs:CTA-b/l  Heart: RRR S1S2  Abdomen: Soft, NTND  Ext: no pedal edema b/l  : no johnson  Vascular Access-  AVF and perm cath    LABS:                        9.7    8.26  )-----------( 230      ( 08 Jan 2022 14:07 )             32.9     Na(138)/K(4.1)/Cl(98)/HCO3(24)/BUN(48)/Cr(4.67)Glu(100)/Ca(10.4)/Mg(--)/PO4(--)    01-10 @ 07:23  Na(137)/K(3.9)/Cl(98)/HCO3(23)/BUN(34)/Cr(3.39)Glu(105)/Ca(9.8)/Mg(--)/PO4(--)    01-09 @ 07:15  Na(137)/K(4.9)/Cl(99)/HCO3(25)/BUN(54)/Cr(4.67)Glu(124)/Ca(10.3)/Mg(--)/PO4(--)    01-08 @ 14:07  Na(135)/K(4.3)/Cl(101)/HCO3(19)/BUN(29)/Cr(3.06)Glu(130)/Ca(10.1)/Mg(--)/PO4(--)    01-07 @ 12:38      70yo M w/ PMHx of CAD (s/p CABG 2019), CKD (unknown stage), DM2, Parkinson's Disease, HTN, depression presents with bilateral leg swelling  ESRD   Severe LV dysfunction; A flutter    + COVID      1 Palliation - Intermittent confusion ;   Seroquel 50mg po qHS  2 Renal- dependent on hemodialysis        NO indication for HD today        Next HD tomorrow - No UF -       BP support with Midodrine 10 mg   3 CVS- Lopressor for heart rate control   4 Anemia - s/p Retacrit 4000 units x1 dose-    5 Vasc - s/p  LUE AVF--Immature (this will take weeks to months to mature) ;  If he stays longer then will try for BAM maturation; Perm cath       Maria Ines Watts MD  [facility]  Office: [phone]

## 2022-01-10 NOTE — PROGRESS NOTE ADULT - ASSESSMENT
71 M w volume overload, GI consulted for drop in hgb    1. CHF per cardio    2. ABBY on CKD per renal  HD via permacath per renal, midodrine during dialysis  sp L AVF, awaiting maturation     3. Drop in hgb, no overt GI Bleed.    - h/h now stable     4. RP bleed  s/p Abdominal Angiography and Embolization on 10/29/2021 by Interventional radiology of l4and l5 lumbar artery  now h/h stable     5. Failure to thrive  -for PEG today    [facility]  Gastroenterology and Hepatology  [address] [place], [place]  Office: [phone]  Cell: [phone]

## 2022-01-10 NOTE — CHART NOTE - NSCHARTNOTEFT_GEN_A_CORE
Nutrition Follow Up Note  Patient seen for: calorie count + malnutrition follow-up. Chart reviewed, events noted.     "72yo M w/ PMHx of CAD (s/p CABG ), CKD (unknown stage), DM2, Parkinson's Disease, HTN, depression presents with new onset acute heart failure exacerbation, NSTEMI, started on hep gtt, developed bl RP bleed, acute anemia. sp MICU course for hemorrhagic shock, 10/28 sp IR embolization l4 and l5 lumbar artery. sp permacath and AVF."    Source: [x] Patient       [x] Medical Record        [] RN        [] Family at bedside       [] Other:    -If unable to interview patient: [] Trach/Vent/BiPAP  [] Disoriented/confused/inappropriate to interview    Diet Order:   Diet, NPO after Midnight:      NPO Start Date: 2022,   NPO Start Time: 23:59  Except Medications (22)  Diet, Regular:   Supplement Feeding Modality:  Oral  Nepro Cans or Servings Per Day:  1       Frequency:  Daily  Ensure Pudding Cans or Servings Per Day:  1       Frequency:  Three Times a day (22)    - Is current order appropriate/adequate? [x] Yes  []  No:     - PO intake :   [] >75%  Adequate    [] 50-75%  Fair       [x] <50%  Poor    - Nutrition-related concerns:      - Per GI, plan for PEG today.       - Pt more alert at visit today. Pt reports he doesn't like Nepro.       - Per chart, pt ordered for SSI admelog, remeron, sinemet, Nephro-celeste, folic acid, and Vitamin D3      - Calorie Count (-) sheet not filled out completely, only intake documented is "ensure 3oz" on . However, pt is ordered for Nepro. Nepro 3 oz provides 159 kcal, 7.2 g pro. Per flowsheets, pt consumed 0-50% meals -. Pt still not meeting estimated protein-energy needs.        GI: Denies nausea, vomiting, constipation, diarrhea.  Last BM  per chart.   Bowel Regimen? [x] Yes (miralax, senna)   [] No      Weights:   Daily Weight in k.6 (12-30), 67.1 (12-30), 69.3 (12-28),  69.8 (12-28),  68.6 (12-26),  68.6 (12-26),  70.4 (12-24),  71 (12-23),  71 (12-23)  Dosing wt: 96.4 kg (12-14)  Noted with progressive wt decline, likely 2/2 poor PO intake. RD to continue to monitor weight trends as able/available.     MEDICATIONS  (STANDING):  atorvastatin 80 milliGRAM(s) Oral at bedtime  carbidopa/levodopa  25/100 2.5 Tablet(s) Oral <User Schedule>  carbidopa/levodopa  25/100 2 Tablet(s) Oral <User Schedule>  chlorhexidine 4% Liquid 1 Application(s) Topical <User Schedule>  cholecalciferol 1000 Unit(s) Oral daily  cinacalcet 60 milliGRAM(s) Oral daily  dextrose 40% Gel 15 Gram(s) Oral once  dextrose 5%. 1000 milliLiter(s) (50 mL/Hr) IV Continuous <Continuous>  dextrose 5%. 1000 milliLiter(s) (100 mL/Hr) IV Continuous <Continuous>  dextrose 50% Injectable 25 Gram(s) IV Push once  dextrose 50% Injectable 12.5 Gram(s) IV Push once  dextrose 50% Injectable 25 Gram(s) IV Push once  diVALproex Sprinkle 250 milliGRAM(s) Oral three times a day  DULoxetine 20 milliGRAM(s) Oral daily  folic acid 1 milliGRAM(s) Oral daily  glucagon  Injectable 1 milliGRAM(s) IntraMuscular once  heparin   Injectable 5000 Unit(s) SubCutaneous every 12 hours  insulin lispro (ADMELOG) corrective regimen sliding scale   SubCutaneous three times a day before meals  insulin lispro (ADMELOG) corrective regimen sliding scale   SubCutaneous at bedtime  levothyroxine Injectable 25 MICROGram(s) IV Push at bedtime  memantine 5 milliGRAM(s) Oral at bedtime  metoprolol tartrate 50 milliGRAM(s) Oral two times a day  midodrine 10 milliGRAM(s) Oral <User Schedule>  mirtazapine 7.5 milliGRAM(s) Oral daily  Nephro-celeste 1 Tablet(s) Oral daily  polyethylene glycol 3350 17 Gram(s) Oral two times a day  QUEtiapine 50 milliGRAM(s) Oral at bedtime  senna Syrup 10 milliLiter(s) Oral at bedtime  trihexyphenidyl 2 milliGRAM(s) Oral three times a day    MEDICATIONS  (PRN):  acetaminophen     Tablet .. 650 milliGRAM(s) Oral every 6 hours PRN Mild Pain (1 - 3)  albuterol/ipratropium for Nebulization 3 milliLiter(s) Nebulizer every 6 hours PRN Shortness of Breath and/or Wheezing  diVALproex Sprinkle 125 milliGRAM(s) Oral every 8 hours PRN Agitation  guaiFENesin Oral Liquid (Sugar-Free) 200 milliGRAM(s) Oral every 6 hours PRN Cough  ondansetron Injectable 4 milliGRAM(s) IV Push once PRN Nausea and/or Vomiting  QUEtiapine 12.5 milliGRAM(s) Oral every 6 hours PRN agitation  sodium chloride 0.9% lock flush 10 milliLiter(s) IV Push every 1 hour PRN Pre/post blood products, medications, blood draw, and to maintain line patency      Pertinent Labs:   01-10 @ 07:23: Na 138, BUN 48<H>, Cr 4.67<H>, <H>, K+ 4.1    A1C with Estimated Average Glucose Result: 6.4 % (10-22-21 @ 08:56)    Finger Sticks:  POCT Blood Glucose.: 103 mg/dL (01-10 @ 08:04)  POCT Blood Glucose.: 108 mg/dL ( @ 21:06)  POCT Blood Glucose.: 109 mg/dL ( @ 16:22)  POCT Blood Glucose.: 126 mg/dL ( @ 12:12)      Skin per nursing documentation: No pressure injuries noted.  Edema per nursing documentation: none noted    Estimated Needs:   [x] no change since previous assessment  5412-0588 kcal/day (30-35 kcal/kg)   gm protein/day (1.2-1.4 g/kg)  Defer fluid needs to team  based on IBW 78 kg    Previous Nutrition Diagnosis: Increased Nutrient Needs;  Food and Nutrition Related Knowledge Deficit;  Severe acute on chronic malnutrition    Nutrition Diagnosis is: [x] ongoing  [] resolved [] not applicable   Being addressed with liberalized PO diet, micronutrient supplementation, and oral nutrition supplements     Nutrition Care Plan:  [x] In Progress  [] Achieved  [] Not applicable    New Nutrition Diagnosis: [x] Not applicable    Nutrition Interventions:     Education Provided   [x] Yes:  encouraged pt to consume meals and supplements as tolerated    Recommendations:      1) If PO diet to be continued, continue Regular diet with Ensure Pudding 3x/day. Consider changing Nepro to Glucerna 1x/day to trial.  2) If/When EN to be initiated, recommend start Nepro @ 10 ml/hr, increase 10 ml q 8 hours as tolerated to goal rate 55 ml x 24 hours. Defer free water flushes to team. Total regimen to provide 1320 ml total volume, 2347 kcal, 107 g pro, 960 ml free water. Provides 35 kcal/kg, 1.6 g pro/kg based on most recent weight 66.6 kg (12-30). Meets >100% RDIs.  3) Continue Nephro-celeste, Vitamin D3, and Folic acid if no medical contraindications as medically appropriate.  4) Please continue to assist pt with meals, and encourage PO intake as able.  5) Monitor PO intake, GI tolerance, skin integrity, labs, weight.  6) malnutrition alert in chart     Monitoring and Evaluation:   Continue to monitor nutritional intake, tolerance to diet prescription, weights, labs, skin integrity    RD remains available upon request and will follow up per protocol  Maribel Gifford, DONN, CDN Pager #[phone] Nutrition Follow Up Note  Patient seen for: calorie count + malnutrition follow-up. Chart reviewed, events noted.     "70yo M w/ PMHx of CAD (s/p CABG ), CKD (unknown stage), DM2, Parkinson's Disease, HTN, depression presents with new onset acute heart failure exacerbation, NSTEMI, started on hep gtt, developed bl RP bleed, acute anemia. sp MICU course for hemorrhagic shock, 10/28 sp IR embolization l4 and l5 lumbar artery. sp permacath and AVF."    Source: [x] Patient       [x] Medical Record        [] RN        [] Family at bedside       [] Other:    -If unable to interview patient: [] Trach/Vent/BiPAP  [] Disoriented/confused/inappropriate to interview    Diet Order:   Diet, NPO after Midnight:      NPO Start Date: 2022,   NPO Start Time: 23:59  Except Medications (22)  Diet, Regular:   Supplement Feeding Modality:  Oral  Nepro Cans or Servings Per Day:  1       Frequency:  Daily  Ensure Pudding Cans or Servings Per Day:  1       Frequency:  Three Times a day (22)    - Is current order appropriate/adequate? [x] Yes  []  No:     - PO intake :   [] >75%  Adequate    [] 50-75%  Fair       [x] <50%  Poor    - Nutrition-related concerns:      - Per GI, plan for PEG today.       - Pt more alert at visit today. Pt reports he doesn't like Nepro.       - Per chart, pt ordered for SSI admelog, remeron, sinemet, Nephro-celeste, folic acid, and Vitamin D3      - Calorie Count (-) sheet not filled out completely, only intake documented is "ensure 3oz" on . However, pt is ordered for Nepro. Nepro 3 oz provides 159 kcal, 7.2 g pro. Per flowsheets, pt consumed 0-50% meals -. Pt still not meeting estimated protein-energy needs.        GI: Denies nausea, vomiting, constipation, diarrhea.  Last BM  per chart.   Bowel Regimen? [x] Yes (miralax, senna)   [] No      Weights:   Daily Weight in k.6 (12-30), 67.1 (12-30), 69.3 (12-28),  69.8 (12-28),  68.6 (12-26),  68.6 (12-26),  70.4 (12-24),  71 (12-23),  71 (12-23)  Dosing wt: 96.4 kg (12-14)  Noted with progressive wt decline, likely 2/2 poor PO intake. RD to continue to monitor weight trends as able/available.     MEDICATIONS  (STANDING):  atorvastatin 80 milliGRAM(s) Oral at bedtime  carbidopa/levodopa  25/100 2.5 Tablet(s) Oral <User Schedule>  carbidopa/levodopa  25/100 2 Tablet(s) Oral <User Schedule>  chlorhexidine 4% Liquid 1 Application(s) Topical <User Schedule>  cholecalciferol 1000 Unit(s) Oral daily  cinacalcet 60 milliGRAM(s) Oral daily  dextrose 40% Gel 15 Gram(s) Oral once  dextrose 5%. 1000 milliLiter(s) (50 mL/Hr) IV Continuous <Continuous>  dextrose 5%. 1000 milliLiter(s) (100 mL/Hr) IV Continuous <Continuous>  dextrose 50% Injectable 25 Gram(s) IV Push once  dextrose 50% Injectable 12.5 Gram(s) IV Push once  dextrose 50% Injectable 25 Gram(s) IV Push once  diVALproex Sprinkle 250 milliGRAM(s) Oral three times a day  DULoxetine 20 milliGRAM(s) Oral daily  folic acid 1 milliGRAM(s) Oral daily  glucagon  Injectable 1 milliGRAM(s) IntraMuscular once  heparin   Injectable 5000 Unit(s) SubCutaneous every 12 hours  insulin lispro (ADMELOG) corrective regimen sliding scale   SubCutaneous three times a day before meals  insulin lispro (ADMELOG) corrective regimen sliding scale   SubCutaneous at bedtime  levothyroxine Injectable 25 MICROGram(s) IV Push at bedtime  memantine 5 milliGRAM(s) Oral at bedtime  metoprolol tartrate 50 milliGRAM(s) Oral two times a day  midodrine 10 milliGRAM(s) Oral <User Schedule>  mirtazapine 7.5 milliGRAM(s) Oral daily  Nephro-celeste 1 Tablet(s) Oral daily  polyethylene glycol 3350 17 Gram(s) Oral two times a day  QUEtiapine 50 milliGRAM(s) Oral at bedtime  senna Syrup 10 milliLiter(s) Oral at bedtime  trihexyphenidyl 2 milliGRAM(s) Oral three times a day    MEDICATIONS  (PRN):  acetaminophen     Tablet .. 650 milliGRAM(s) Oral every 6 hours PRN Mild Pain (1 - 3)  albuterol/ipratropium for Nebulization 3 milliLiter(s) Nebulizer every 6 hours PRN Shortness of Breath and/or Wheezing  diVALproex Sprinkle 125 milliGRAM(s) Oral every 8 hours PRN Agitation  guaiFENesin Oral Liquid (Sugar-Free) 200 milliGRAM(s) Oral every 6 hours PRN Cough  ondansetron Injectable 4 milliGRAM(s) IV Push once PRN Nausea and/or Vomiting  QUEtiapine 12.5 milliGRAM(s) Oral every 6 hours PRN agitation  sodium chloride 0.9% lock flush 10 milliLiter(s) IV Push every 1 hour PRN Pre/post blood products, medications, blood draw, and to maintain line patency      Pertinent Labs:   01-10 @ 07:23: Na 138, BUN 48<H>, Cr 4.67<H>, <H>, K+ 4.1    A1C with Estimated Average Glucose Result: 6.4 % (10-22-21 @ 08:56)    Finger Sticks:  POCT Blood Glucose.: 103 mg/dL (01-10 @ 08:04)  POCT Blood Glucose.: 108 mg/dL ( @ 21:06)  POCT Blood Glucose.: 109 mg/dL ( @ 16:22)  POCT Blood Glucose.: 126 mg/dL ( @ 12:12)      Skin per nursing documentation: No pressure injuries noted.  Edema per nursing documentation: none noted    Estimated Needs:   [x] no change since previous assessment  9340-7868 kcal/day (30-35 kcal/kg)   gm protein/day (1.2-1.4 g/kg)  Defer fluid needs to team  based on IBW 78 kg    Previous Nutrition Diagnosis: Increased Nutrient Needs;  Food and Nutrition Related Knowledge Deficit;  Severe acute on chronic malnutrition    Nutrition Diagnosis is: [x] ongoing  [] resolved [] not applicable   Being addressed with liberalized PO diet, micronutrient supplementation, and oral nutrition supplements     Nutrition Care Plan:  [x] In Progress  [] Achieved  [] Not applicable    New Nutrition Diagnosis: [x] Not applicable    Nutrition Interventions:     Education Provided   [x] Yes:  encouraged pt to consume meals and supplements as tolerated    Recommendations:      1) If PO diet to be continued, continue Regular diet with Ensure Pudding 3x/day. Consider changing Nepro to Glucerna 1x/day to trial.  2) If/When EN to be initiated, recommend start Nepro @ 10 ml/hr, increase 10 ml q 8 hours as tolerated to goal rate 55 ml x 24 hours. Defer free water flushes to team. Total regimen to provide 1320 ml total volume, 2347 kcal, 107 g pro, 960 ml free water. Provides 35 kcal/kg, 1.6 g pro/kg based on most recent weight 66.6 kg (12-30). Meets >100% RDIs.  3) Check electrolytes (K+, Phos, Mg) and monitor q12 hours as able, as pt is at risk of refeeding syndrome 2/2 prolonged inadequate energy intake. Replete aggressively PRN.  4) Consider addition of thiamine supplement, pending no medical contraindications, for risk of refeeding syndrome. Continue Nephro-celeste, Vitamin D3, and Folic acid if no medical contraindications as medically appropriate.  5) Please continue to assist pt with meals, and encourage PO intake as able.  6) Monitor PO intake, GI tolerance, skin integrity, labs, weight.  7) malnutrition alert in chart     Monitoring and Evaluation:   Continue to monitor nutritional intake, tolerance to diet prescription, weights, labs, skin integrity    RD remains available upon request and will follow up per protocol  Maribel Gifford, DONN, CDN Pager #[phone] Nutrition Follow Up Note  Patient seen for: calorie count + malnutrition follow-up. Chart reviewed, events noted.     "70yo M w/ PMHx of CAD (s/p CABG ), CKD (unknown stage), DM2, Parkinson's Disease, HTN, depression presents with new onset acute heart failure exacerbation, NSTEMI, started on hep gtt, developed bl RP bleed, acute anemia. sp MICU course for hemorrhagic shock, 10/28 sp IR embolization l4 and l5 lumbar artery. sp permacath and AVF."    Source: [x] Patient       [x] Medical Record        [] RN        [] Family at bedside       [] Other:    -If unable to interview patient: [] Trach/Vent/BiPAP  [] Disoriented/confused/inappropriate to interview    Diet Order:   Diet, NPO after Midnight:      NPO Start Date: 2022,   NPO Start Time: 23:59  Except Medications (22)  Diet, Regular:   Supplement Feeding Modality:  Oral  Nepro Cans or Servings Per Day:  1       Frequency:  Daily  Ensure Pudding Cans or Servings Per Day:  1       Frequency:  Three Times a day (22)    - Is current order appropriate/adequate? [x] Yes  []  No:     - PO intake :   [] >75%  Adequate    [] 50-75%  Fair       [x] <50%  Poor    - Nutrition-related concerns:      - Per GI, plan for PEG today.       - Pt more alert at visit today. Pt reports he doesn't like Nepro.       - Per chart, pt ordered for SSI admelog, remeron, sinemet, Nephro-celeste, folic acid, and Vitamin D3      - Calorie Count (-) sheet not filled out completely, only intake documented is "ensure 3oz" on . However, pt is ordered for Nepro. Nepro 3 oz provides 159 kcal, 7.2 g pro. Per flowsheets, pt consumed 0-50% meals -. Pt still not meeting estimated protein-energy needs.        GI: Denies nausea, vomiting, constipation, diarrhea.  Last BM  per chart.   Bowel Regimen? [x] Yes (miralax, senna)   [] No      Weights:   Daily Weight in k.6 (12-30), 67.1 (12-30), 69.3 (12-28),  69.8 (12-28),  68.6 (12-26),  68.6 (12-26),  70.4 (12-24),  71 (12-23),  71 (12-23)  Dosing wt: 96.4 kg (12-14)  Noted with progressive wt decline, likely 2/2 poor PO intake. RD to continue to monitor weight trends as able/available.     MEDICATIONS  (STANDING):  atorvastatin 80 milliGRAM(s) Oral at bedtime  carbidopa/levodopa  25/100 2.5 Tablet(s) Oral <User Schedule>  carbidopa/levodopa  25/100 2 Tablet(s) Oral <User Schedule>  chlorhexidine 4% Liquid 1 Application(s) Topical <User Schedule>  cholecalciferol 1000 Unit(s) Oral daily  cinacalcet 60 milliGRAM(s) Oral daily  dextrose 40% Gel 15 Gram(s) Oral once  dextrose 5%. 1000 milliLiter(s) (50 mL/Hr) IV Continuous <Continuous>  dextrose 5%. 1000 milliLiter(s) (100 mL/Hr) IV Continuous <Continuous>  dextrose 50% Injectable 25 Gram(s) IV Push once  dextrose 50% Injectable 12.5 Gram(s) IV Push once  dextrose 50% Injectable 25 Gram(s) IV Push once  diVALproex Sprinkle 250 milliGRAM(s) Oral three times a day  DULoxetine 20 milliGRAM(s) Oral daily  folic acid 1 milliGRAM(s) Oral daily  glucagon  Injectable 1 milliGRAM(s) IntraMuscular once  heparin   Injectable 5000 Unit(s) SubCutaneous every 12 hours  insulin lispro (ADMELOG) corrective regimen sliding scale   SubCutaneous three times a day before meals  insulin lispro (ADMELOG) corrective regimen sliding scale   SubCutaneous at bedtime  levothyroxine Injectable 25 MICROGram(s) IV Push at bedtime  memantine 5 milliGRAM(s) Oral at bedtime  metoprolol tartrate 50 milliGRAM(s) Oral two times a day  midodrine 10 milliGRAM(s) Oral <User Schedule>  mirtazapine 7.5 milliGRAM(s) Oral daily  Nephro-celeste 1 Tablet(s) Oral daily  polyethylene glycol 3350 17 Gram(s) Oral two times a day  QUEtiapine 50 milliGRAM(s) Oral at bedtime  senna Syrup 10 milliLiter(s) Oral at bedtime  trihexyphenidyl 2 milliGRAM(s) Oral three times a day    MEDICATIONS  (PRN):  acetaminophen     Tablet .. 650 milliGRAM(s) Oral every 6 hours PRN Mild Pain (1 - 3)  albuterol/ipratropium for Nebulization 3 milliLiter(s) Nebulizer every 6 hours PRN Shortness of Breath and/or Wheezing  diVALproex Sprinkle 125 milliGRAM(s) Oral every 8 hours PRN Agitation  guaiFENesin Oral Liquid (Sugar-Free) 200 milliGRAM(s) Oral every 6 hours PRN Cough  ondansetron Injectable 4 milliGRAM(s) IV Push once PRN Nausea and/or Vomiting  QUEtiapine 12.5 milliGRAM(s) Oral every 6 hours PRN agitation  sodium chloride 0.9% lock flush 10 milliLiter(s) IV Push every 1 hour PRN Pre/post blood products, medications, blood draw, and to maintain line patency      Pertinent Labs:   01-10 @ 07:23: Na 138, BUN 48<H>, Cr 4.67<H>, <H>, K+ 4.1    A1C with Estimated Average Glucose Result: 6.4 % (10-22-21 @ 08:56)    Finger Sticks:  POCT Blood Glucose.: 103 mg/dL (01-10 @ 08:04)  POCT Blood Glucose.: 108 mg/dL ( @ 21:06)  POCT Blood Glucose.: 109 mg/dL ( @ 16:22)  POCT Blood Glucose.: 126 mg/dL ( @ 12:12)      Skin per nursing documentation: No pressure injuries noted.  Edema per nursing documentation: none noted    Estimated Needs:   [x] no change since previous assessment  6927-6553 kcal/day (30-35 kcal/kg)   gm protein/day (1.2-1.4 g/kg)  Defer fluid needs to team  based on IBW 78 kg    Previous Nutrition Diagnosis: Increased Nutrient Needs;  Food and Nutrition Related Knowledge Deficit;  Severe acute on chronic malnutrition    Nutrition Diagnosis is: [x] ongoing  [] resolved [] not applicable   Being addressed with liberalized PO diet, micronutrient supplementation, and oral nutrition supplements     Nutrition Care Plan:  [x] In Progress  [] Achieved  [] Not applicable    New Nutrition Diagnosis: [x] Not applicable    Nutrition Interventions:     Education Provided   [x] Yes:  encouraged pt to consume meals and supplements as tolerated    Recommendations:      1) If PO diet to be continued, continue Regular diet with Ensure Pudding 3x/day. Consider changing Nepro to Glucerna 1x/day to trial.  2) If/When EN to be initiated, recommend start Nepro @ 10 ml/hr, increase 10 ml q 8 hours as tolerated to goal rate 55 ml/hr x 24 hours. Defer free water flushes to team. Total regimen to provide 1320 ml total volume, 2347 kcal, 107 g pro, 960 ml free water. Provides 35 kcal/kg, 1.6 g pro/kg based on most recent weight 66.6 kg (12-30). Meets >100% RDIs.  3) Check electrolytes (K+, Phos, Mg) and monitor q12 hours as able, as pt is at risk of refeeding syndrome 2/2 prolonged inadequate energy intake. Replete aggressively PRN.  4) Consider addition of thiamine supplement, pending no medical contraindications, for risk of refeeding syndrome. Continue Nephro-celeste, Vitamin D3, and Folic acid if no medical contraindications as medically appropriate.  5) Please continue to assist pt with meals, and encourage PO intake as able.  6) Monitor PO intake, GI tolerance, skin integrity, labs, weight.  7) malnutrition alert in chart     Monitoring and Evaluation:   Continue to monitor nutritional intake, tolerance to diet prescription, weights, labs, skin integrity    RD remains available upon request and will follow up per protocol  Maribel Gifford, DONN, CDN Pager #[phone] Nutrition Follow Up Note  Patient seen for: calorie count + malnutrition follow-up. Chart reviewed, events noted.     "70yo M w/ PMHx of CAD (s/p CABG ), CKD (unknown stage), DM2, Parkinson's Disease, HTN, depression presents with new onset acute heart failure exacerbation, NSTEMI, started on hep gtt, developed bl RP bleed, acute anemia. sp MICU course for hemorrhagic shock, 10/28 sp IR embolization l4 and l5 lumbar artery. sp permacath and AVF."    Source: [x] Patient       [x] Medical Record        [] RN        [] Family at bedside       [] Other:    -If unable to interview patient: [] Trach/Vent/BiPAP  [] Disoriented/confused/inappropriate to interview    Diet Order:   Diet, NPO after Midnight:      NPO Start Date: 2022,   NPO Start Time: 23:59  Except Medications (22)  Diet, Regular:   Supplement Feeding Modality:  Oral  Nepro Cans or Servings Per Day:  1       Frequency:  Daily  Ensure Pudding Cans or Servings Per Day:  1       Frequency:  Three Times a day (22)    - Is current order appropriate/adequate? [x] Yes  []  No:     - PO intake :   [] >75%  Adequate    [] 50-75%  Fair       [x] <50%  Poor    - Nutrition-related concerns:      - Per GI, plan for PEG today.       - Pt more alert at visit today. Pt reports he doesn't like Nepro.       - Per chart, pt ordered for SSI admelog, remeron, sinemet, Nephro-celeste, folic acid, and Vitamin D3      - Per chart, Sinemet is ordered for 06:00, 14:00, and 22:00.      - Calorie Count (-) sheet not filled out completely, only intake documented is "ensure 3oz" on . However, pt is ordered for Nepro. Nepro 3 oz provides 159 kcal, 7.2 g pro. Per flowsheets, pt consumed 0-50% meals -. Pt still not meeting estimated protein-energy needs.        GI: Denies nausea, vomiting, constipation, diarrhea.  Last BM  per chart.   Bowel Regimen? [x] Yes (miralax, senna)   [] No      Weights:   Daily Weight in k.6 (12-30), 67.1 (12-30), 69.3 (12-28),  69.8 (12-28),  68.6 (12-),  68.6 (12-),  70.4 (12-24),  71 (12-23),  71 (12-23)  Dosing wt: 96.4 kg (12-14)  Noted with progressive wt decline, likely 2/2 poor PO intake. RD to continue to monitor weight trends as able/available.     MEDICATIONS  (STANDING):  atorvastatin 80 milliGRAM(s) Oral at bedtime  carbidopa/levodopa  25/100 2.5 Tablet(s) Oral <User Schedule>  carbidopa/levodopa  25/100 2 Tablet(s) Oral <User Schedule>  chlorhexidine 4% Liquid 1 Application(s) Topical <User Schedule>  cholecalciferol 1000 Unit(s) Oral daily  cinacalcet 60 milliGRAM(s) Oral daily  dextrose 40% Gel 15 Gram(s) Oral once  dextrose 5%. 1000 milliLiter(s) (50 mL/Hr) IV Continuous <Continuous>  dextrose 5%. 1000 milliLiter(s) (100 mL/Hr) IV Continuous <Continuous>  dextrose 50% Injectable 25 Gram(s) IV Push once  dextrose 50% Injectable 12.5 Gram(s) IV Push once  dextrose 50% Injectable 25 Gram(s) IV Push once  diVALproex Sprinkle 250 milliGRAM(s) Oral three times a day  DULoxetine 20 milliGRAM(s) Oral daily  folic acid 1 milliGRAM(s) Oral daily  glucagon  Injectable 1 milliGRAM(s) IntraMuscular once  heparin   Injectable 5000 Unit(s) SubCutaneous every 12 hours  insulin lispro (ADMELOG) corrective regimen sliding scale   SubCutaneous three times a day before meals  insulin lispro (ADMELOG) corrective regimen sliding scale   SubCutaneous at bedtime  levothyroxine Injectable 25 MICROGram(s) IV Push at bedtime  memantine 5 milliGRAM(s) Oral at bedtime  metoprolol tartrate 50 milliGRAM(s) Oral two times a day  midodrine 10 milliGRAM(s) Oral <User Schedule>  mirtazapine 7.5 milliGRAM(s) Oral daily  Nephro-celeste 1 Tablet(s) Oral daily  polyethylene glycol 3350 17 Gram(s) Oral two times a day  QUEtiapine 50 milliGRAM(s) Oral at bedtime  senna Syrup 10 milliLiter(s) Oral at bedtime  trihexyphenidyl 2 milliGRAM(s) Oral three times a day    MEDICATIONS  (PRN):  acetaminophen     Tablet .. 650 milliGRAM(s) Oral every 6 hours PRN Mild Pain (1 - 3)  albuterol/ipratropium for Nebulization 3 milliLiter(s) Nebulizer every 6 hours PRN Shortness of Breath and/or Wheezing  diVALproex Sprinkle 125 milliGRAM(s) Oral every 8 hours PRN Agitation  guaiFENesin Oral Liquid (Sugar-Free) 200 milliGRAM(s) Oral every 6 hours PRN Cough  ondansetron Injectable 4 milliGRAM(s) IV Push once PRN Nausea and/or Vomiting  QUEtiapine 12.5 milliGRAM(s) Oral every 6 hours PRN agitation  sodium chloride 0.9% lock flush 10 milliLiter(s) IV Push every 1 hour PRN Pre/post blood products, medications, blood draw, and to maintain line patency      Pertinent Labs:   01-10 @ 07:23: Na 138, BUN 48<H>, Cr 4.67<H>, <H>, K+ 4.1    A1C with Estimated Average Glucose Result: 6.4 % (10-22-21 @ 08:56)    Finger Sticks:  POCT Blood Glucose.: 103 mg/dL (01-10 @ 08:04)  POCT Blood Glucose.: 108 mg/dL ( @ 21:06)  POCT Blood Glucose.: 109 mg/dL ( @ 16:22)  POCT Blood Glucose.: 126 mg/dL ( @ 12:12)      Skin per nursing documentation: No pressure injuries noted.  Edema per nursing documentation: none noted    Estimated Needs:   [x] no change since previous assessment  9470-7003 kcal/day (30-35 kcal/kg)   gm protein/day (1.2-1.4 g/kg)  Defer fluid needs to team  based on IBW 78 kg    Previous Nutrition Diagnosis: Increased Nutrient Needs;  Food and Nutrition Related Knowledge Deficit;  Severe acute on chronic malnutrition    Nutrition Diagnosis is: [x] ongoing  [] resolved [] not applicable   Being addressed with liberalized PO diet, micronutrient supplementation, and oral nutrition supplements     Nutrition Care Plan:  [x] In Progress  [] Achieved  [] Not applicable    New Nutrition Diagnosis: [x] Not applicable    Nutrition Interventions:     Education Provided   [x] Yes:  encouraged pt to consume meals and supplements as tolerated    Recommendations:      1) If PO diet to be continued, continue Regular diet with Ensure Pudding 3x/day. Consider changing Nepro to Glucerna 1x/day to trial.  2) If/When EN to be initiated, recommend Nepro bolus 120 ml increase 120 ml per bolus as tolerated to goal bolus 330 ml 4x daily (08:00, 12:00, 16:00, 20:00) for consideration of Sinemet administration. Defer free water flushes to team. Total regimen to provide 1320 ml total volume, 2347 kcal, 107 g pro, 960 ml free water. Provides 35 kcal/kg, 1.6 g pro/kg based on most recent weight 66.6 kg (12-30). Meets >100% RDIs.  3) Check electrolytes (K+, Phos, Mg) and monitor q12 hours as able, as pt is at risk of refeeding syndrome 2/2 prolonged inadequate energy intake. Replete aggressively PRN. Monitor blood glucose.  4) Consider addition of thiamine supplement, pending no medical contraindications, for risk of refeeding syndrome. Continue Nephro-celeste, Vitamin D3, and Folic acid if no medical contraindications as medically appropriate.  5) Please continue to assist pt with meals, and encourage PO intake as able.  6) Monitor PO intake, GI tolerance, skin integrity, labs, weight.  7) malnutrition alert in chart     Monitoring and Evaluation:   Continue to monitor nutritional intake, tolerance to diet prescription, weights, labs, skin integrity    RD remains available upon request and will follow up per protocol  Maribel Gifford RDN, CDN Pager #[phone] Nutrition Follow Up Note  Patient seen for: calorie count + malnutrition follow-up. Chart reviewed, events noted.     "70yo M w/ PMHx of CAD (s/p CABG ), CKD (unknown stage), DM2, Parkinson's Disease, HTN, depression presents with new onset acute heart failure exacerbation, NSTEMI, started on hep gtt, developed bl RP bleed, acute anemia. sp MICU course for hemorrhagic shock, 10/28 sp IR embolization l4 and l5 lumbar artery. sp permacath and AVF."    Source: [x] Patient       [x] Medical Record        [] RN        [] Family at bedside       [] Other:    -If unable to interview patient: [] Trach/Vent/BiPAP  [] Disoriented/confused/inappropriate to interview    Diet Order:   Diet, NPO after Midnight:      NPO Start Date: 2022,   NPO Start Time: 23:59  Except Medications (22)  Diet, Regular:   Supplement Feeding Modality:  Oral  Nepro Cans or Servings Per Day:  1       Frequency:  Daily  Ensure Pudding Cans or Servings Per Day:  1       Frequency:  Three Times a day (22)    - Is current order appropriate/adequate? [x] Yes  []  No:     - PO intake :   [] >75%  Adequate    [] 50-75%  Fair       [x] <50%  Poor    - Nutrition-related concerns:      - Per GI, plan for PEG today.       - Pt more alert at visit today. Pt reports he doesn't like Nepro.       - Per chart, pt ordered for SSI admelog, remeron, sinemet, Nephro-celeste, folic acid, and Vitamin D3      - Per chart, Sinemet is ordered for 06:00, 14:00, and 22:00.      - Calorie Count (-) sheet not filled out completely, only intake documented is "ensure 3oz" on . However, pt is ordered for Nepro. Nepro 3 oz provides 159 kcal, 7.2 g pro. Per flowsheets, pt consumed 0-50% meals -. Pt still not meeting estimated protein-energy needs.        GI: Denies nausea, vomiting, constipation, diarrhea.  Last BM  per chart.   Bowel Regimen? [x] Yes (miralax, senna)   [] No      Weights:   Daily Weight in k.6 (12-30), 67.1 (12-30), 69.3 (12-28),  69.8 (12-28),  68.6 (12-),  68.6 (12-),  70.4 (12-24),  71 (12-23),  71 (12-23)  Dosing wt: 96.4 kg (12-14)  Noted with progressive wt decline, likely 2/2 poor PO intake. RD to continue to monitor weight trends as able/available.     MEDICATIONS  (STANDING):  atorvastatin 80 milliGRAM(s) Oral at bedtime  carbidopa/levodopa  25/100 2.5 Tablet(s) Oral <User Schedule>  carbidopa/levodopa  25/100 2 Tablet(s) Oral <User Schedule>  chlorhexidine 4% Liquid 1 Application(s) Topical <User Schedule>  cholecalciferol 1000 Unit(s) Oral daily  cinacalcet 60 milliGRAM(s) Oral daily  dextrose 40% Gel 15 Gram(s) Oral once  dextrose 5%. 1000 milliLiter(s) (50 mL/Hr) IV Continuous <Continuous>  dextrose 5%. 1000 milliLiter(s) (100 mL/Hr) IV Continuous <Continuous>  dextrose 50% Injectable 25 Gram(s) IV Push once  dextrose 50% Injectable 12.5 Gram(s) IV Push once  dextrose 50% Injectable 25 Gram(s) IV Push once  diVALproex Sprinkle 250 milliGRAM(s) Oral three times a day  DULoxetine 20 milliGRAM(s) Oral daily  folic acid 1 milliGRAM(s) Oral daily  glucagon  Injectable 1 milliGRAM(s) IntraMuscular once  heparin   Injectable 5000 Unit(s) SubCutaneous every 12 hours  insulin lispro (ADMELOG) corrective regimen sliding scale   SubCutaneous three times a day before meals  insulin lispro (ADMELOG) corrective regimen sliding scale   SubCutaneous at bedtime  levothyroxine Injectable 25 MICROGram(s) IV Push at bedtime  memantine 5 milliGRAM(s) Oral at bedtime  metoprolol tartrate 50 milliGRAM(s) Oral two times a day  midodrine 10 milliGRAM(s) Oral <User Schedule>  mirtazapine 7.5 milliGRAM(s) Oral daily  Nephro-celeste 1 Tablet(s) Oral daily  polyethylene glycol 3350 17 Gram(s) Oral two times a day  QUEtiapine 50 milliGRAM(s) Oral at bedtime  senna Syrup 10 milliLiter(s) Oral at bedtime  trihexyphenidyl 2 milliGRAM(s) Oral three times a day    MEDICATIONS  (PRN):  acetaminophen     Tablet .. 650 milliGRAM(s) Oral every 6 hours PRN Mild Pain (1 - 3)  albuterol/ipratropium for Nebulization 3 milliLiter(s) Nebulizer every 6 hours PRN Shortness of Breath and/or Wheezing  diVALproex Sprinkle 125 milliGRAM(s) Oral every 8 hours PRN Agitation  guaiFENesin Oral Liquid (Sugar-Free) 200 milliGRAM(s) Oral every 6 hours PRN Cough  ondansetron Injectable 4 milliGRAM(s) IV Push once PRN Nausea and/or Vomiting  QUEtiapine 12.5 milliGRAM(s) Oral every 6 hours PRN agitation  sodium chloride 0.9% lock flush 10 milliLiter(s) IV Push every 1 hour PRN Pre/post blood products, medications, blood draw, and to maintain line patency      Pertinent Labs:   01-10 @ 07:23: Na 138, BUN 48<H>, Cr 4.67<H>, <H>, K+ 4.1    A1C with Estimated Average Glucose Result: 6.4 % (10-22-21 @ 08:56)    Finger Sticks:  POCT Blood Glucose.: 103 mg/dL (01-10 @ 08:04)  POCT Blood Glucose.: 108 mg/dL ( @ 21:06)  POCT Blood Glucose.: 109 mg/dL ( @ 16:22)  POCT Blood Glucose.: 126 mg/dL ( @ 12:12)      Skin per nursing documentation: No pressure injuries noted.  Edema per nursing documentation: none noted    Estimated Needs:   [x] no change since previous assessment  5971-7564 kcal/day (30-35 kcal/kg)   gm protein/day (1.2-1.4 g/kg)  Defer fluid needs to team  based on IBW 78 kg    Previous Nutrition Diagnosis: Increased Nutrient Needs;  Food and Nutrition Related Knowledge Deficit;  Severe acute on chronic malnutrition    Nutrition Diagnosis is: [x] ongoing  [] resolved [] not applicable   Being addressed with liberalized PO diet, micronutrient supplementation, and oral nutrition supplements     Nutrition Care Plan:  [x] In Progress  [] Achieved  [] Not applicable    New Nutrition Diagnosis: [x] Not applicable    Nutrition Interventions:     Education Provided   [x] Yes:  encouraged pt to consume meals and supplements as tolerated    Recommendations:      1) If PO diet to be continued, continue Regular diet with Ensure Pudding 3x/day. Consider changing Nepro to Glucerna 1x/day to trial.  2) If/When EN to be initiated, recommend Nepro bolus 120 ml increase 120 ml per bolus as tolerated to goal bolus 330 ml 4x daily (08:00, 12:00, 16:00, 20:00) with consideration for Sinemet administration. Defer free water flushes to team. Total regimen to provide 1320 ml total volume, 2347 kcal, 107 g pro, 960 ml free water. Provides 35 kcal/kg, 1.6 g pro/kg based on most recent weight 66.6 kg (12-30). Meets >100% RDIs.  3) Check electrolytes (K+, Phos, Mg) and monitor q12 hours as able, as pt is at risk of refeeding syndrome 2/2 prolonged inadequate energy intake. Replete aggressively PRN. Monitor blood glucose.  4) Consider addition of thiamine supplement, pending no medical contraindications, for risk of refeeding syndrome. Continue Nephro-celeste, Vitamin D3, and Folic acid if no medical contraindications as medically appropriate.  5) Please continue to assist pt with meals as appropriate, and encourage PO intake as able.  6) Monitor PO intake, EN tolerance, skin integrity, labs, weight.  7) RD remains available to adjust EN Regimen as needed.  8) malnutrition alert in chart     Monitoring and Evaluation:   Continue to monitor nutritional intake, tolerance to diet prescription, weights, labs, skin integrity    RD remains available upon request and will follow up per protocol  Maribel Gifford, MEHDI, CDN Pager #[phone]

## 2022-01-10 NOTE — PRE-ANESTHESIA EVALUATION ADULT - NSRADCARDRESULTSFT_GEN_ALL_CORE
TTE 10/21/21: EF (Visual Estimate): 35 %  Doppler Peak Velocity (m/sec): AoV=1.5  ------------------------------------------------------------------------  Observations:  Mitral Valve: Mitral annular calcification. Mild mitral regurgitation.  Aortic Valve/Aorta: Calcified trileaflet aortic valve with normal opening. Peak transaortic valve gradient equals 10 mm Hg, mean transaortic valve gradient equals 6 mm Hg, aortic valve velocity time integral equals 40 cm, estimated aortic valve area equals 2.1 sqcm. Peak left ventricular outflow tract gradient equals 3 mm Hg, mean gradient is equal to 2 mm Hg, LVOT velocity time integral equals 20 cm.  Aortic Root: 3.6 cm.  LVOT diameter: 2.2 cm.  Left Atrium: Severely dilated left atrium.  LA volume index = 59 cc/m2.  Left Ventricle: Endocardial visualization enhanced with intravenous injection of Ultrasonic Enhancing Agent (Definity). moderate to severe segmental left ventricular systolic dysfunction. There is hypokinesis of anterior wall, septum and apex. Visual estimate of EF about 35%. Increased relative wall thickness with normal left ventricular mass index, consistent with concentric left ventricular remodeling. Normal diastolic function  Right Heart: Normal right atrium. Normal right ventricular size with decreased right ventricular systolic  function.  Normal tricuspid valve. Mild tricuspid regurgitation.  Normal pulmonic valve. Minimal pulmonic regurgitation.  Pericardium/Pleura: Normal pericardium with no pericardial effusion. Bilateral pleural effusions.  Hemodynamic: Estimated right atrial pressure is 8 mm Hg.  Estimated right ventricular systolic pressure equals 24 mm Hg, assuming right atrial pressure equals 8 mm Hg, consistent with normal pulmonary pressures.  ------------------------------------------------------------------------  Conclusions:  1. Calcified trileaflet aortic valve with normal opening. Peak transaortic valve gradient equals 10 mm Hg, mean transaortic valve gradient equals 6 mm Hg, aortic valve velocity time integral equals 40 cm,  estimated aortic valve area equals 2.1 sqcm.  2. Endocardial visualization enhanced with intravenous injection of Ultrasonic Enhancing Agent (Definity). moderate to severe segmental left ventricular systolic dysfunction. There is hypokinesis of anterior wall, septum and apex. Visual estimate of EF about 35%.  3. Normal right ventricular size with decreased right ventricular systolic function.  4. Normal tricuspid valve. Mild tricuspid regurgitation.  5. Estimated pulmonary artery systolic pressure equals 24 mm Hg, assuming right atrial pressure equals 8  mm Hg, consistent with normal pulmonary pressures.  6. Bilateral pleural effusions.

## 2022-01-10 NOTE — PROGRESS NOTE ADULT - SUBJECTIVE AND OBJECTIVE BOX
Endocrinology Attending Covering for Dr. Reece      Chief complaint  Patient is a 71y old  Male who presents with a chief complaint of leg swelling (10 Paul 2022 10:42)   Review of systems  Patient in bed, looks comfortable, no fever,  had no hypoglycemia.    Labs and Fingersticks  CAPILLARY BLOOD GLUCOSE      POCT Blood Glucose.: 93 mg/dL (10 Paul 2022 11:22)  POCT Blood Glucose.: 103 mg/dL (10 Paul 2022 08:04)  POCT Blood Glucose.: 108 mg/dL (09 Jan 2022 21:06)  POCT Blood Glucose.: 109 mg/dL (09 Jan 2022 16:22)      Anion Gap, Serum: 16 (01-10 @ 07:23)  Anion Gap, Serum: 16 (01-09 @ 07:15)  Anion Gap, Serum: 13 (01-08 @ 14:07)      Calcium, Total Serum: 10.4 (01-10 @ 07:23)  Calcium, Total Serum: 9.8 (01-09 @ 07:15)  Calcium, Total Serum: 10.3 (01-08 @ 14:07)  Albumin, Serum: 2.3 *L* (01-09 @ 07:15)    Alanine Aminotransferase (ALT/SGPT): <5 *L* (01-09 @ 07:15)  Alkaline Phosphatase, Serum: 83 (01-09 @ 07:15)  Aspartate Aminotransferase (AST/SGOT): 37 (01-09 @ 07:15)        01-10    138  |  98  |  48<H>  ----------------------------<  100<H>  4.1   |  24  |  4.67<H>    Ca    10.4      10 Paul 2022 07:23    TPro  8.3  /  Alb  2.3<L>  /  TBili  0.6  /  DBili  0.2  /  AST  37  /  ALT  <5<L>  /  AlkPhos  83  01-09                        10.1   8.17  )-----------( 179      ( 10 Paul 2022 11:54 )             34.4     Medications  MEDICATIONS  (STANDING):  atorvastatin 80 milliGRAM(s) Oral at bedtime  carbidopa/levodopa  25/100 2.5 Tablet(s) Oral <User Schedule>  carbidopa/levodopa  25/100 2 Tablet(s) Oral <User Schedule>  chlorhexidine 4% Liquid 1 Application(s) Topical <User Schedule>  cholecalciferol 1000 Unit(s) Oral daily  cinacalcet 60 milliGRAM(s) Oral daily  dextrose 40% Gel 15 Gram(s) Oral once  dextrose 5%. 1000 milliLiter(s) (50 mL/Hr) IV Continuous <Continuous>  dextrose 5%. 1000 milliLiter(s) (100 mL/Hr) IV Continuous <Continuous>  dextrose 50% Injectable 25 Gram(s) IV Push once  dextrose 50% Injectable 12.5 Gram(s) IV Push once  dextrose 50% Injectable 25 Gram(s) IV Push once  diVALproex Sprinkle 250 milliGRAM(s) Oral three times a day  DULoxetine 20 milliGRAM(s) Oral daily  folic acid 1 milliGRAM(s) Oral daily  glucagon  Injectable 1 milliGRAM(s) IntraMuscular once  heparin   Injectable 5000 Unit(s) SubCutaneous every 12 hours  insulin lispro (ADMELOG) corrective regimen sliding scale   SubCutaneous three times a day before meals  insulin lispro (ADMELOG) corrective regimen sliding scale   SubCutaneous at bedtime  levothyroxine Injectable 25 MICROGram(s) IV Push at bedtime  memantine 5 milliGRAM(s) Oral at bedtime  metoprolol tartrate 50 milliGRAM(s) Oral two times a day  midodrine 10 milliGRAM(s) Oral <User Schedule>  mirtazapine 7.5 milliGRAM(s) Oral daily  Nephro-celeste 1 Tablet(s) Oral daily  polyethylene glycol 3350 17 Gram(s) Oral two times a day  QUEtiapine 50 milliGRAM(s) Oral at bedtime  senna Syrup 10 milliLiter(s) Oral at bedtime  trihexyphenidyl 2 milliGRAM(s) Oral three times a day      Physical Exam  General: Patient comfortable in bed  Vital Signs Last 12 Hrs  T(F): 97.8 (01-10-22 @ 11:07), Max: 97.8 (01-10-22 @ 11:07)  HR: 78 (01-10-22 @ 11:07) (78 - 113)  BP: 132/85 (01-10-22 @ 11:07) (113/73 - 132/85)  BP(mean): --  RR: 18 (01-10-22 @ 11:07) (18 - 19)  SpO2: 99% (01-10-22 @ 11:07) (98% - 99%)  Neck: No palpable thyroid nodules.  CVS: S1S2, No murmurs  Respiratory: No wheezing, no crepitations  GI: Abdomen soft, bowel sounds positive  Musculoskeletal:  edema lower extremities.   Skin: No skin rashes, no ecchymosis    Diagnostics

## 2022-01-10 NOTE — PROGRESS NOTE ADULT - ASSESSMENT
72yo M w/ PMHx of CAD (s/p CABG 2019), CKD (unknown stage), DM2, Parkinson's Disease, HTN, depression presents with new onset acute heart failure exacerbation, NSTEMI, started on hep gtt, developed bl RP bleed, acute anemia. sp MICU course for hemorrhagic shock, 10/28 sp IR embolization l4 and l5 lumbar artery. sp permacath and AVF.    # Acute on chronic systolic and diastolic heart failure  # NSTEMI, medical management  # ESRD, new HD  # Atrial flutter  # acute hypoxic resp failure  # hemorrhagic shock, left RP hematoma, acute blood loss anemia sp embolization l4 and l5 lumbar artery 10/28  # lupus anticoagulant +  HD via permacath per renal, midodrine during dialysis  sp L AVF   12/13 pt pulled out permacath, replaced with new permacath 12/14  per renal, AVF needs more time to mature    # Parkinson's disease   # delirium  cont on depakote and cymbalta  c/w trihexyphenidyl, carbidopa/levodopa   cont seroquel 50mg qhs     # DM2 (diabetes mellitus, type 2)  per endo  hba1c 6.4    # CAD (coronary artery disease)  # HTN  lopressor, atorvastatin  asa on hold    # FTT  no improvement on Remeron, poor calorie counts, indicated if along goals of care  son HCP is interested in artificial nutrition, GI evaluated but pt was covid pos, now off quarantine  cont supplement shakes  Fu GI possible PEG today    # surveillance COVID positive 12/28  asymptomatic off isolation  sating well on RA  unclear why d dimer and ferritin ordered for 1/9 labs, given bed bound and covid +, will check doppler LE ro dvt    DVT ppx hsq    DNR/DNI    dispo: fu doppler LE ro dvt, fu GI re PEG    PCP Dr. Simons    [facility]  [phone]

## 2022-01-11 NOTE — PROGRESS NOTE ADULT - SUBJECTIVE AND OBJECTIVE BOX
Patient is a 71y old  Male who presents with a chief complaint of leg swelling (11 Jan 2022 12:12)      SUBJECTIVE / OVERNIGHT EVENTS:    Patient seen and examined. no complaints. poor historian. samira peg.      Vital Signs Last 24 Hrs  T(C): 36.5 (11 Jan 2022 11:06), Max: 36.9 (11 Jan 2022 03:35)  T(F): 97.7 (11 Jan 2022 11:06), Max: 98.4 (11 Jan 2022 03:35)  HR: 92 (11 Jan 2022 11:06) (78 - 128)  BP: 122/72 (11 Jan 2022 11:06) (101/59 - 164/88)  BP(mean): --  RR: 18 (11 Jan 2022 11:06) (18 - 20)  SpO2: 96% (11 Jan 2022 11:06) (96% - 99%)  I&O's Summary    10 Paul 2022 07:01  -  11 Jan 2022 07:00  --------------------------------------------------------  IN: 240 mL / OUT: 0 mL / NET: 240 mL    11 Jan 2022 07:01  -  11 Jan 2022 12:25  --------------------------------------------------------  IN: 30 mL / OUT: 0 mL / NET: 30 mL        PE:  GENERAL: NAD, AAOx1, frail  HEAD:  Atraumatic, Normocephalic  CHEST/LUNG: CTABL, No wheeze, right permacath  HEART: Regular rate and rhythm; no murmur  ABDOMEN: Soft, Nontender, Nondistended; Bowel sounds present  EXTREMITIES:  2+ Peripheral Pulses, No clubbing, cyanosis, or edema  SKIN: left forearm avf  NEURO: No focal deficits    LABS:                        9.5    9.07  )-----------( 147      ( 11 Jan 2022 06:18 )             32.6     01-11    138  |  100  |  59<H>  ----------------------------<  87  4.7   |  21<L>  |  5.93<H>    Ca    10.3      11 Jan 2022 06:18      PT/INR - ( 10 Paul 2022 07:29 )   PT: 12.1 sec;   INR: 1.01 ratio           CAPILLARY BLOOD GLUCOSE      POCT Blood Glucose.: 103 mg/dL (11 Jan 2022 08:50)  POCT Blood Glucose.: 104 mg/dL (10 Paul 2022 21:35)  POCT Blood Glucose.: 112 mg/dL (10 Paul 2022 19:19)            RADIOLOGY & ADDITIONAL TESTS:    Imaging Personally Reviewed:  [x] YES  [ ] NO    Consultant(s) Notes Reviewed:  [x] YES  [ ] NO    MEDICATIONS  (STANDING):  atorvastatin 80 milliGRAM(s) Oral at bedtime  carbidopa/levodopa  25/100 2.5 Tablet(s) Oral <User Schedule>  carbidopa/levodopa  25/100 2 Tablet(s) Oral <User Schedule>  chlorhexidine 4% Liquid 1 Application(s) Topical <User Schedule>  cholecalciferol 1000 Unit(s) Oral daily  cinacalcet 60 milliGRAM(s) Oral daily  dextrose 40% Gel 15 Gram(s) Oral once  dextrose 5%. 1000 milliLiter(s) (50 mL/Hr) IV Continuous <Continuous>  dextrose 5%. 1000 milliLiter(s) (100 mL/Hr) IV Continuous <Continuous>  dextrose 50% Injectable 25 Gram(s) IV Push once  dextrose 50% Injectable 12.5 Gram(s) IV Push once  dextrose 50% Injectable 25 Gram(s) IV Push once  diVALproex Sprinkle 250 milliGRAM(s) Oral three times a day  DULoxetine 20 milliGRAM(s) Oral daily  folic acid 1 milliGRAM(s) Oral daily  glucagon  Injectable 1 milliGRAM(s) IntraMuscular once  heparin   Injectable 5000 Unit(s) SubCutaneous every 12 hours  insulin lispro (ADMELOG) corrective regimen sliding scale   SubCutaneous three times a day before meals  insulin lispro (ADMELOG) corrective regimen sliding scale   SubCutaneous at bedtime  levothyroxine Injectable 25 MICROGram(s) IV Push at bedtime  memantine 5 milliGRAM(s) Oral at bedtime  metoprolol tartrate 50 milliGRAM(s) Oral two times a day  midodrine 10 milliGRAM(s) Oral <User Schedule>  mirtazapine 7.5 milliGRAM(s) Oral daily  Nephro-celeste 1 Tablet(s) Oral daily  pantoprazole  Injectable 40 milliGRAM(s) IV Push two times a day  polyethylene glycol 3350 17 Gram(s) Oral two times a day  QUEtiapine 50 milliGRAM(s) Oral at bedtime  senna Syrup 10 milliLiter(s) Oral at bedtime  trihexyphenidyl 2 milliGRAM(s) Oral three times a day    MEDICATIONS  (PRN):  acetaminophen     Tablet .. 650 milliGRAM(s) Oral every 6 hours PRN Mild Pain (1 - 3)  albuterol/ipratropium for Nebulization 3 milliLiter(s) Nebulizer every 6 hours PRN Shortness of Breath and/or Wheezing  diVALproex Sprinkle 125 milliGRAM(s) Oral every 8 hours PRN Agitation  guaiFENesin Oral Liquid (Sugar-Free) 200 milliGRAM(s) Oral every 6 hours PRN Cough  ondansetron Injectable 4 milliGRAM(s) IV Push once PRN Nausea and/or Vomiting  QUEtiapine 12.5 milliGRAM(s) Oral every 6 hours PRN agitation  sodium chloride 0.9% lock flush 10 milliLiter(s) IV Push every 1 hour PRN Pre/post blood products, medications, blood draw, and to maintain line patency      Care Discussed with Consultants/Other Providers [x] YES  [ ] NO    HEALTH ISSUES - PROBLEM Dx:  Acute heart failure    Atrial flutter    DM2 (diabetes mellitus, type 2)    CAD (coronary artery disease)    Parkinsons disease    Acute on chronic renal failure    NSTEMI (non-ST elevation myocardial infarction)    Hypertension    Acute kidney injury superimposed on CKD    Anemia    Hypothyroidism    Delirium    Advanced care planning/counseling discussion    Palliative care status    Palliative care encounter    Pain    Stage 5 chronic kidney disease not on chronic dialysis    Hypercalcemia    Preop cardiovascular exam

## 2022-01-11 NOTE — PROGRESS NOTE ADULT - ASSESSMENT
71 M w volume overload, GI consulted for drop in hgb    1. CHF per cardio    2. ABBY on CKD per renal  HD via permacath per renal, midodrine during dialysis  sp L AVF, awaiting maturation     3. Drop in hgb, no overt GI Bleed, no bleeding around PEG site  -hgb 9.5, continue to trend CBCs    4. RP bleed  s/p Abdominal Angiography and Embolization on 10/29/2021 by Interventional radiology of l4and l5 lumbar artery  now h/h stable     5. Failure to thrive  -s/p PEG yesterday 1/10 via endoscopy   -maintain abdominal binder and mittens   -PEG can be used for meds and TF    6. stress duodenal ulcers  -PPI BID  -stool for h-pylori testing      Attending supervision statement: I have personally seen and examined the patient. I fully participated in the care of this patient. I have made amendments to the documentation where necessary, and agree with the history, physical exam, and plan as outlined by the ACP.    [facility]  Gastroenterology and Hepatology  [address] [place], [place]  Office: [phone]  Cell: [phone]

## 2022-01-11 NOTE — PROGRESS NOTE ADULT - ASSESSMENT
72yo M w/ PMHx of CAD (s/p CABG 2019), CKD (unknown stage), DM2, Parkinson's Disease, HTN, depression presents with bilateral leg swelling  ESRD   Severe LV dysfunction; A flutter    + COVID      1 Palliation - Intermittent confusion ;   Seroquel 50mg po qHS  2 Renal- dependent on hemodialysis         HD today             BP support with Midodrine 10 mg   3 CVS- Lopressor for heart rate control   4 Anemia - s/p Retacrit 4000 units x1 dose-    5 Vasc - s/p  LUE AVF--Immature (this will take weeks to months to mature) ;    6 GI-Feeds to starte    [facility]   [phone]

## 2022-01-11 NOTE — PROGRESS NOTE ADULT - SUBJECTIVE AND OBJECTIVE BOX
NEPHROLOGY[facility] [phone]        Patient seen and examined in bed.  He was more interactive         MEDICATIONS  (STANDING):  atorvastatin 80 milliGRAM(s) Oral at bedtime  carbidopa/levodopa  25/100 2.5 Tablet(s) Oral <User Schedule>  carbidopa/levodopa  25/100 2 Tablet(s) Oral <User Schedule>  chlorhexidine 4% Liquid 1 Application(s) Topical <User Schedule>  cholecalciferol 1000 Unit(s) Oral daily  cinacalcet 60 milliGRAM(s) Oral daily  dextrose 40% Gel 15 Gram(s) Oral once  dextrose 5%. 1000 milliLiter(s) (50 mL/Hr) IV Continuous <Continuous>  dextrose 5%. 1000 milliLiter(s) (100 mL/Hr) IV Continuous <Continuous>  dextrose 50% Injectable 25 Gram(s) IV Push once  dextrose 50% Injectable 12.5 Gram(s) IV Push once  dextrose 50% Injectable 25 Gram(s) IV Push once  diVALproex Sprinkle 250 milliGRAM(s) Oral three times a day  DULoxetine 20 milliGRAM(s) Oral daily  epoetin mari-epbx (RETACRIT) Injectable 4000 Unit(s) IV Push once  folic acid 1 milliGRAM(s) Oral daily  glucagon  Injectable 1 milliGRAM(s) IntraMuscular once  heparin   Injectable 5000 Unit(s) SubCutaneous every 12 hours  insulin lispro (ADMELOG) corrective regimen sliding scale   SubCutaneous three times a day before meals  insulin lispro (ADMELOG) corrective regimen sliding scale   SubCutaneous at bedtime  levothyroxine Injectable 25 MICROGram(s) IV Push at bedtime  memantine 5 milliGRAM(s) Oral at bedtime  metoprolol tartrate 50 milliGRAM(s) Oral two times a day  midodrine 10 milliGRAM(s) Oral <User Schedule>  mirtazapine 7.5 milliGRAM(s) Oral daily  Nephro-celeste 1 Tablet(s) Oral daily  pantoprazole  Injectable 40 milliGRAM(s) IV Push two times a day  polyethylene glycol 3350 17 Gram(s) Oral two times a day  QUEtiapine 50 milliGRAM(s) Oral at bedtime  senna Syrup 10 milliLiter(s) Oral at bedtime  trihexyphenidyl 2 milliGRAM(s) Oral three times a day      VITAL:  T(C): , Max: 36.9 (01-11-22 @ 03:35)  T(F): , Max: 98.4 (01-11-22 @ 03:35)  HR: 92 (01-11-22 @ 11:06)  BP: 122/72 (01-11-22 @ 11:06)  BP(mean): --  RR: 18 (01-11-22 @ 11:06)  SpO2: 96% (01-11-22 @ 11:06)  Wt(kg): --    I and O's:    01-10 @ 07:01  -  01-11 @ 07:00  --------------------------------------------------------  IN: 240 mL / OUT: 0 mL / NET: 240 mL    01-11 @ 07:01  -  01-11 @ 13:47  --------------------------------------------------------  IN: 30 mL / OUT: 0 mL / NET: 30 mL      Height (cm): 180.3 (01-10 @ 15:32)  Weight (kg): 96.4 (01-10 @ 15:32)  BMI (kg/m2): 29.7 (01-10 @ 15:32)  BSA (m2): 2.16 (01-10 @ 15:32)    PHYSICAL EXAM:    Constitutional: NAD  Neck:  No JVD  Respiratory: CTAB/L  Cardiovascular: S1 and S2  Gastrointestinal: BS+, soft, NT/ND + peg   Extremities: No peripheral edema  Neurological: A/O x 3, no focal deficits  Psychiatric: Normal mood, normal affect  : No Javier  Skin: No rashes  Access: perm cath and avf     LABS:                        9.5    9.07  )-----------( 147      ( 11 Jan 2022 06:18 )             32.6     01-11    138  |  100  |  59<H>  ----------------------------<  87  4.7   |  21<L>  |  5.93<H>    Ca    10.3      11 Jan 2022 06:18            Urine Studies:          RADIOLOGY & ADDITIONAL STUDIES:

## 2022-01-11 NOTE — PROGRESS NOTE ADULT - SUBJECTIVE AND OBJECTIVE BOX
Chief complaint    Patient is a 71y old  Male who presents with a chief complaint of leg swelling (11 Jan 2022 13:47)   Review of systems  Patient in bed, appears comfortable.    Labs and Fingersticks  CAPILLARY BLOOD GLUCOSE      POCT Blood Glucose.: 86 mg/dL (11 Jan 2022 18:54)  POCT Blood Glucose.: 80 mg/dL (11 Jan 2022 18:34)  POCT Blood Glucose.: 117 mg/dL (11 Jan 2022 12:48)  POCT Blood Glucose.: 103 mg/dL (11 Jan 2022 08:50)  POCT Blood Glucose.: 104 mg/dL (10 Paul 2022 21:35)      Anion Gap, Serum: 17 (01-11 @ 06:18)  Anion Gap, Serum: 16 (01-10 @ 07:23)      Calcium, Total Serum: 10.3 (01-11 @ 06:18)  Calcium, Total Serum: 10.4 (01-10 @ 07:23)          01-11    138  |  100  |  59<H>  ----------------------------<  87  4.7   |  21<L>  |  5.93<H>    Ca    10.3      11 Jan 2022 06:18                          9.5    9.07  )-----------( 147      ( 11 Jan 2022 06:18 )             32.6     Medications  MEDICATIONS  (STANDING):  atorvastatin 80 milliGRAM(s) Oral at bedtime  carbidopa/levodopa  25/100 2.5 Tablet(s) Oral <User Schedule>  carbidopa/levodopa  25/100 2 Tablet(s) Oral <User Schedule>  chlorhexidine 4% Liquid 1 Application(s) Topical <User Schedule>  cholecalciferol 1000 Unit(s) Oral daily  cinacalcet 60 milliGRAM(s) Oral daily  dextrose 40% Gel 15 Gram(s) Oral once  dextrose 5%. 1000 milliLiter(s) (50 mL/Hr) IV Continuous <Continuous>  dextrose 5%. 1000 milliLiter(s) (100 mL/Hr) IV Continuous <Continuous>  dextrose 50% Injectable 25 Gram(s) IV Push once  dextrose 50% Injectable 12.5 Gram(s) IV Push once  dextrose 50% Injectable 25 Gram(s) IV Push once  diVALproex Sprinkle 250 milliGRAM(s) Oral three times a day  DULoxetine 20 milliGRAM(s) Oral daily  folic acid 1 milliGRAM(s) Oral daily  glucagon  Injectable 1 milliGRAM(s) IntraMuscular once  heparin   Injectable 5000 Unit(s) SubCutaneous every 12 hours  insulin lispro (ADMELOG) corrective regimen sliding scale   SubCutaneous three times a day before meals  insulin lispro (ADMELOG) corrective regimen sliding scale   SubCutaneous at bedtime  levothyroxine Injectable 25 MICROGram(s) IV Push at bedtime  memantine 5 milliGRAM(s) Oral at bedtime  metoprolol tartrate 50 milliGRAM(s) Oral two times a day  midodrine 10 milliGRAM(s) Oral <User Schedule>  mirtazapine 7.5 milliGRAM(s) Oral daily  Nephro-celeste 1 Tablet(s) Oral daily  pantoprazole  Injectable 40 milliGRAM(s) IV Push two times a day  polyethylene glycol 3350 17 Gram(s) Oral two times a day  QUEtiapine 50 milliGRAM(s) Oral at bedtime  senna Syrup 10 milliLiter(s) Oral at bedtime  trihexyphenidyl 2 milliGRAM(s) Oral three times a day      Physical Exam  General: Patient comfortable in bed  Vital Signs Last 12 Hrs  T(F): 98.5 (01-11-22 @ 15:21), Max: 98.5 (01-11-22 @ 15:21)  HR: 93 (01-11-22 @ 15:21) (92 - 121)  BP: 129/77 (01-11-22 @ 15:21) (111/77 - 129/77)  BP(mean): --  RR: 18 (01-11-22 @ 15:21) (18 - 18)  SpO2: 95% (01-11-22 @ 15:21) (95% - 99%)  Neck: No palpable thyroid nodules.  CVS: S1S2, No murmurs  Respiratory: No wheezing, no crepitations  GI: Abdomen soft, bowel sounds positive  Musculoskeletal:  edema lower extremities.     Diagnostics

## 2022-01-11 NOTE — PROGRESS NOTE ADULT - ASSESSMENT
70yo M w/ PMHx of CAD (s/p CABG 2019), CKD (unknown stage), DM2, Parkinson's Disease, HTN, depression presents with new onset acute heart failure exacerbation, NSTEMI, started on hep gtt, developed bl RP bleed, acute anemia. sp MICU course for hemorrhagic shock, 10/28 sp IR embolization l4 and l5 lumbar artery. sp permacath and AVF.    # Acute on chronic systolic and diastolic heart failure  # NSTEMI, medical management  # ESRD, new HD  # Atrial flutter  # acute hypoxic resp failure  # hemorrhagic shock, left RP hematoma, acute blood loss anemia sp embolization l4 and l5 lumbar artery 10/28  # lupus anticoagulant +  HD via permacath per renal, midodrine during dialysis  sp L AVF   12/13 pt pulled out permacath, replaced with new permacath 12/14  per renal, AVF needs more time to mature    # Parkinson's disease   # delirium  cont on depakote and cymbalta  c/w trihexyphenidyl, carbidopa/levodopa   cont seroquel 50mg qhs     # DM2 (diabetes mellitus, type 2)  per endo  hba1c 6.4    # CAD (coronary artery disease)  # HTN  lopressor, atorvastatin  asa on hold    # FTT  sp PEG via endoscopy  nutrition for tube feeds  mittens and abd binder    # surveillance COVID positive 12/28  asymptomatic off isolation  sating well on RA  doppler LE ro dvt    DVT ppx hsq    DNR/DNI    dispo: fu doppler LE ro dvt, start tube feeds    Dr. Pineda will take over tomorrow.  Please contact with any questions or concerns [phone].

## 2022-01-11 NOTE — PROGRESS NOTE ADULT - ASSESSMENT
Assessment  DMT2: 71y Male with DM T2 with hyperglycemia, A1C 6.4%, was on insulin at home, now on insulin coverage only, blood sugars within acceptable range, no hypoglycemias, off isolation now plann,ing peg placement tomorrow.  Hypothyroidism: Patient has no history thyroid disease, was not on any thyroid supplements, subclinical with low-normal FT4, lethargic, started on synthroid 25 mcg po daily, repeat FT4 trending down, increased to synthroid  50 mcg po daily, FT4 improving.  Hypercalcemia: Started on Sensipar 60mg daily, Ca fluctuating/remains elevated..  CHF: on medications, stable, monitored.  HTN: Controlled,  on antihypertensive medications.  Parkinsons: on meds, monitored.  CKD: Monitor labs/BMP      Dr Ayers  Cell : [phone]

## 2022-01-11 NOTE — PROGRESS NOTE ADULT - SUBJECTIVE AND OBJECTIVE BOX
Chief Complaint:  Patient is a 71y old  Male who presents with a chief complaint of leg swelling (10 Paul 2022 16:38)      Date of service 22 @ 12:13      Interval Events:   Patient seen and examined. S/p PEG yesterday.     Hospital Medications:  acetaminophen     Tablet .. 650 milliGRAM(s) Oral every 6 hours PRN  albuterol/ipratropium for Nebulization 3 milliLiter(s) Nebulizer every 6 hours PRN  atorvastatin 80 milliGRAM(s) Oral at bedtime  carbidopa/levodopa  25/100 2.5 Tablet(s) Oral <User Schedule>  carbidopa/levodopa  25/100 2 Tablet(s) Oral <User Schedule>  chlorhexidine 4% Liquid 1 Application(s) Topical <User Schedule>  cholecalciferol 1000 Unit(s) Oral daily  cinacalcet 60 milliGRAM(s) Oral daily  dextrose 40% Gel 15 Gram(s) Oral once  dextrose 5%. 1000 milliLiter(s) IV Continuous <Continuous>  dextrose 5%. 1000 milliLiter(s) IV Continuous <Continuous>  dextrose 50% Injectable 25 Gram(s) IV Push once  dextrose 50% Injectable 12.5 Gram(s) IV Push once  dextrose 50% Injectable 25 Gram(s) IV Push once  diVALproex Sprinkle 125 milliGRAM(s) Oral every 8 hours PRN  diVALproex Sprinkle 250 milliGRAM(s) Oral three times a day  DULoxetine 20 milliGRAM(s) Oral daily  folic acid 1 milliGRAM(s) Oral daily  glucagon  Injectable 1 milliGRAM(s) IntraMuscular once  guaiFENesin Oral Liquid (Sugar-Free) 200 milliGRAM(s) Oral every 6 hours PRN  heparin   Injectable 5000 Unit(s) SubCutaneous every 12 hours  insulin lispro (ADMELOG) corrective regimen sliding scale   SubCutaneous three times a day before meals  insulin lispro (ADMELOG) corrective regimen sliding scale   SubCutaneous at bedtime  levothyroxine Injectable 25 MICROGram(s) IV Push at bedtime  memantine 5 milliGRAM(s) Oral at bedtime  metoprolol tartrate 50 milliGRAM(s) Oral two times a day  midodrine 10 milliGRAM(s) Oral <User Schedule>  mirtazapine 7.5 milliGRAM(s) Oral daily  Nephro-celeste 1 Tablet(s) Oral daily  ondansetron Injectable 4 milliGRAM(s) IV Push once PRN  pantoprazole  Injectable 40 milliGRAM(s) IV Push two times a day  polyethylene glycol 3350 17 Gram(s) Oral two times a day  QUEtiapine 50 milliGRAM(s) Oral at bedtime  QUEtiapine 12.5 milliGRAM(s) Oral every 6 hours PRN  senna Syrup 10 milliLiter(s) Oral at bedtime  sodium chloride 0.9% lock flush 10 milliLiter(s) IV Push every 1 hour PRN  trihexyphenidyl 2 milliGRAM(s) Oral three times a day        Review of Systems:  unable to obtain    PHYSICAL EXAM:   Vital Signs:  Vital Signs Last 24 Hrs  T(C): 36.5 (2022 11:06), Max: 36.9 (2022 03:35)  T(F): 97.7 (2022 11:06), Max: 98.4 (2022 03:35)  HR: 92 (2022 11:06) (78 - 128)  BP: 122/72 (2022 11:06) (101/59 - 164/88)  BP(mean): --  RR: 18 (2022 11:06) (18 - 20)  SpO2: 96% (2022 11:06) (96% - 99%)  Daily Height in cm: 180.34 (10 Paul 2022 15:32)    Daily Weight in k.5 (10 Paul 2022 13:23)      PHYSICAL EXAM:     GENERAL:  Appears stated age, well-groomed, well-nourished, no distress  HEENT:  NC/AT,  conjunctivae anicteric, clear and pink,   NECK: supple, trachea midline  CHEST:  Full & symmetric excursion, no increased effort, breath sounds clear  HEART:  Regular rhythm, no JVD  ABDOMEN:  Soft, non-tender, non-distended, normoactive bowel sounds,  no masses , no hepatosplenomegaly, +intact gastrostomy tube   EXTREMITIES:  no cyanosis,clubbing or edema  SKIN:  No rash, erythema, or, ecchymoses, no jaundice  NEURO:  non-focal, no asterixis  PSYCH: disoriented to place and time  RECTAL: Deferred      LABS Personally reviewed by me:                        9.5    9.07  )-----------( 147      ( 2022 06:18 )             32.6     Mean Cell Volume: 109.0 fl (22 @ 06:18)        138  |  100  |  59<H>  ----------------------------<  87  4.7   |  21<L>  |  5.93<H>    Ca    10.3      2022 06:18        PT/INR - ( 10 Paul 2022 07:29 )   PT: 12.1 sec;   INR: 1.01 ratio                                     9.5    9.07  )-----------( 147      ( 2022 06:18 )             32.6                         10.1   8.17  )-----------( 179      ( 10 Paul 2022 11:54 )             34.4                         11.1   7.45  )-----------( 173      ( 10 Paul 2022 11:52 )             38.8                         9.7    8.26  )-----------( 230      ( 2022 14:07 )             32.9       Imaging personally reviewed by me:

## 2022-01-12 NOTE — PROGRESS NOTE ADULT - SUBJECTIVE AND OBJECTIVE BOX
Patient is a 71y old  Male who presents with a chief complaint of leg swelling (12 Jan 2022 11:56)      INTERVAL HPI/OVERNIGHT EVENTS: noted  pt seen and examined this am   events noted  feels well      Vital Signs Last 24 Hrs  T(C): 36.3 (12 Jan 2022 11:04), Max: 36.9 (11 Jan 2022 15:21)  T(F): 97.4 (12 Jan 2022 11:04), Max: 98.5 (11 Jan 2022 15:21)  HR: 75 (12 Jan 2022 11:04) (75 - 113)  BP: 113/65 (12 Jan 2022 11:04) (102/62 - 129/77)  BP(mean): --  RR: 18 (12 Jan 2022 11:04) (17 - 18)  SpO2: 100% (12 Jan 2022 11:04) (95% - 100%)    acetaminophen     Tablet .. 650 milliGRAM(s) Oral every 6 hours PRN  albuterol/ipratropium for Nebulization 3 milliLiter(s) Nebulizer every 6 hours PRN  atorvastatin 80 milliGRAM(s) Oral at bedtime  carbidopa/levodopa  25/100 2.5 Tablet(s) Oral <User Schedule>  carbidopa/levodopa  25/100 2 Tablet(s) Oral <User Schedule>  chlorhexidine 4% Liquid 1 Application(s) Topical <User Schedule>  cholecalciferol 1000 Unit(s) Oral daily  cinacalcet 60 milliGRAM(s) Oral daily  dextrose 40% Gel 15 Gram(s) Oral once  dextrose 5%. 1000 milliLiter(s) IV Continuous <Continuous>  dextrose 5%. 1000 milliLiter(s) IV Continuous <Continuous>  dextrose 50% Injectable 25 Gram(s) IV Push once  dextrose 50% Injectable 12.5 Gram(s) IV Push once  dextrose 50% Injectable 25 Gram(s) IV Push once  diVALproex Sprinkle 125 milliGRAM(s) Oral every 8 hours PRN  diVALproex Sprinkle 250 milliGRAM(s) Oral three times a day  DULoxetine 20 milliGRAM(s) Oral daily  folic acid 1 milliGRAM(s) Oral daily  glucagon  Injectable 1 milliGRAM(s) IntraMuscular once  guaiFENesin Oral Liquid (Sugar-Free) 200 milliGRAM(s) Oral every 6 hours PRN  heparin   Injectable 5000 Unit(s) SubCutaneous every 12 hours  insulin lispro (ADMELOG) corrective regimen sliding scale   SubCutaneous three times a day before meals  insulin lispro (ADMELOG) corrective regimen sliding scale   SubCutaneous at bedtime  levothyroxine Injectable 25 MICROGram(s) IV Push at bedtime  memantine 5 milliGRAM(s) Oral at bedtime  metoprolol tartrate 50 milliGRAM(s) Oral two times a day  midodrine 10 milliGRAM(s) Oral <User Schedule>  mirtazapine 7.5 milliGRAM(s) Oral daily  Nephro-celeste 1 Tablet(s) Oral daily  ondansetron Injectable 4 milliGRAM(s) IV Push once PRN  pantoprazole  Injectable 40 milliGRAM(s) IV Push two times a day  polyethylene glycol 3350 17 Gram(s) Oral two times a day  QUEtiapine 50 milliGRAM(s) Oral at bedtime  QUEtiapine 12.5 milliGRAM(s) Oral every 6 hours PRN  senna Syrup 10 milliLiter(s) Oral at bedtime  sodium chloride 0.9% lock flush 10 milliLiter(s) IV Push every 1 hour PRN  trihexyphenidyl 2 milliGRAM(s) Oral three times a day      PHYSICAL EXAM:  GENERAL: NAD,   EYES: conjunctiva and sclera clear  ENMT: Moist mucous membranes  NECK: Supple, No JVD, Normal thyroid  CHEST/LUNG: non labored, cta b/l  HEART: Regular rate and rhythm; No murmurs, rubs, or gallops  ABDOMEN: Soft, Nontender, Nondistended; Bowel sounds present  EXTREMITIES:  2+ Peripheral Pulses, No clubbing, cyanosis, or edema  LYMPH: No lymphadenopathy noted  SKIN: No rashes or lesions    Consultant(s) Notes Reviewed:  [x ] YES  [ ] NO  Care Discussed with Consultants/Other Providers [ x] YES  [ ] NO    LABS:                        8.7    9.11  )-----------( 134      ( 12 Jan 2022 08:36 )             29.6     01-12    136  |  99  |  37<H>  ----------------------------<  122<H>  3.6   |  25  |  3.76<H>    Ca    9.8      12 Jan 2022 08:36  Phos  3.5     01-12  Mg     2.1     01-12          CAPILLARY BLOOD GLUCOSE      POCT Blood Glucose.: 143 mg/dL (12 Jan 2022 11:42)  POCT Blood Glucose.: 137 mg/dL (12 Jan 2022 08:07)  POCT Blood Glucose.: 174 mg/dL (11 Jan 2022 20:52)  POCT Blood Glucose.: 86 mg/dL (11 Jan 2022 18:54)  POCT Blood Glucose.: 80 mg/dL (11 Jan 2022 18:34)              RADIOLOGY & ADDITIONAL TESTS:    Imaging Personally Reviewed:  [x ] YES  [ ] NO

## 2022-01-12 NOTE — PROGRESS NOTE ADULT - ASSESSMENT
72yo M w/ PMHx of CAD (s/p CABG 2019), CKD (unknown stage), DM2, Parkinson's Disease, HTN, depression presents with new onset acute heart failure exacerbation, NSTEMI, started on hep gtt, developed bl RP bleed, acute anemia. sp MICU course for hemorrhagic shock, 10/28 sp IR embolization l4 and l5 lumbar artery. sp permacath and AVF.    # Acute on chronic systolic and diastolic heart failure  # NSTEMI, medical management  # ESRD, new HD  # Atrial flutter  # acute hypoxic resp failure  # hemorrhagic shock, left RP hematoma, acute blood loss anemia sp embolization l4 and l5 lumbar artery 10/28  # lupus anticoagulant +  HD via permacath per renal, midodrine during dialysis  sp L AVF   12/13 pt pulled out permacath, replaced with new permacath 12/14  per renal, AVF needs more time to mature    # Parkinson's disease   # delirium  cont on depakote and cymbalta  c/w trihexyphenidyl, carbidopa/levodopa   cont seroquel 50mg qhs     # DM2 (diabetes mellitus, type 2)  per endo  hba1c 6.4    # CAD (coronary artery disease)  # HTN  lopressor, atorvastatin  asa on hold    # FTT  sp PEG via endoscopy  nutrition for tube feeds  mittens and abd binder    # surveillance COVID positive 12/28  asymptomatic off isolation  sating well on RA  doppler LE ro dvt    DVT ppx hsq    DNR/DNI    dispo: fu doppler -LE neg dvt,   started tube feeds  dc planning    [facility]  [phone]

## 2022-01-12 NOTE — PROGRESS NOTE ADULT - SUBJECTIVE AND OBJECTIVE BOX
[facility]      Seen earlier today, and examined.  - Today, patient is without complaints.           *****MEDICATIONS: Current medication reviewed and documented.    MEDICATIONS  (STANDING):  atorvastatin 80 milliGRAM(s) Oral at bedtime  carbidopa/levodopa  25/100 2.5 Tablet(s) Oral <User Schedule>  carbidopa/levodopa  25/100 2 Tablet(s) Oral <User Schedule>  chlorhexidine 4% Liquid 1 Application(s) Topical <User Schedule>  cholecalciferol 1000 Unit(s) Oral daily  cinacalcet 60 milliGRAM(s) Oral daily  dextrose 40% Gel 15 Gram(s) Oral once  dextrose 5%. 1000 milliLiter(s) (50 mL/Hr) IV Continuous <Continuous>  dextrose 5%. 1000 milliLiter(s) (100 mL/Hr) IV Continuous <Continuous>  dextrose 50% Injectable 25 Gram(s) IV Push once  dextrose 50% Injectable 12.5 Gram(s) IV Push once  dextrose 50% Injectable 25 Gram(s) IV Push once  diVALproex Sprinkle 250 milliGRAM(s) Oral three times a day  DULoxetine 20 milliGRAM(s) Oral daily  folic acid 1 milliGRAM(s) Oral daily  glucagon  Injectable 1 milliGRAM(s) IntraMuscular once  heparin   Injectable 5000 Unit(s) SubCutaneous every 12 hours  insulin lispro (ADMELOG) corrective regimen sliding scale   SubCutaneous three times a day before meals  insulin lispro (ADMELOG) corrective regimen sliding scale   SubCutaneous at bedtime  levothyroxine Injectable 25 MICROGram(s) IV Push at bedtime  memantine 5 milliGRAM(s) Oral at bedtime  metoprolol tartrate 50 milliGRAM(s) Oral two times a day  midodrine 10 milliGRAM(s) Oral <User Schedule>  mirtazapine 7.5 milliGRAM(s) Oral daily  Nephro-celeste 1 Tablet(s) Oral daily  pantoprazole  Injectable 40 milliGRAM(s) IV Push two times a day  polyethylene glycol 3350 17 Gram(s) Oral two times a day  QUEtiapine 50 milliGRAM(s) Oral at bedtime  senna Syrup 10 milliLiter(s) Oral at bedtime  trihexyphenidyl 2 milliGRAM(s) Oral three times a day    MEDICATIONS  (PRN):  acetaminophen     Tablet .. 650 milliGRAM(s) Oral every 6 hours PRN Mild Pain (1 - 3)  albuterol/ipratropium for Nebulization 3 milliLiter(s) Nebulizer every 6 hours PRN Shortness of Breath and/or Wheezing  diVALproex Sprinkle 125 milliGRAM(s) Oral every 8 hours PRN Agitation  guaiFENesin Oral Liquid (Sugar-Free) 200 milliGRAM(s) Oral every 6 hours PRN Cough  ondansetron Injectable 4 milliGRAM(s) IV Push once PRN Nausea and/or Vomiting  QUEtiapine 12.5 milliGRAM(s) Oral every 6 hours PRN agitation  sodium chloride 0.9% lock flush 10 milliLiter(s) IV Push every 1 hour PRN Pre/post blood products, medications, blood draw, and to maintain line patency          ***** VITAL SIGNS:  T(F): 97.4 (22 @ 11:04), Max: 98.5 (22 @ 15:21)  HR: 75 (22 @ 11:04) (75 - 113)  BP: 113/65 (22 @ 11:04) (102/62 - 129/77)  RR: 18 (22 @ 11:04) (17 - 18)  SpO2: 100% (22 @ 11:04) (95% - 100%)  Wt(kg): --  ,   I&O's Summary    2022 07:01  -  2022 07:00  --------------------------------------------------------  IN: 180 mL / OUT: 0 mL / NET: 180 mL    2022 07:01  -  2022 14:34  --------------------------------------------------------  IN: 240 mL / OUT: 0 mL / NET: 240 mL             *****PHYSICAL EXAM:   alert oriented x2 attention comprehension are  limited   Able to name, repeat.   EOmi fundi not visualized   no nystagmus VFF to confrontation  Tongue is midline   functional quad          *****LAB AND IMAGIN.7    9.11  )-----------( 134      ( 2022 08:36 )             29.6               01-12    136  |  99  |  37<H>  ----------------------------<  122<H>  3.6   |  25  |  3.76<H>    Ca    9.8      2022 08:36  Phos  3.5       Mg     2.1                                [All pertinent recent Imaging/Reports reviewed]           *****A S S E S S M E N T   A N D   P L A N :        :72yo M w/ PMHx of CAD (s/p CABG ), CKD (unknown stage), DM2, Parkinson's Disease, HTN, depression presents with bilateral leg swelling, patient's daughter in-law at bedside Yoly Quintero ([address] [place] [address] Nurse) provides the history, the daughter reports the patient is a poor historian, unable to remember having conversations with others and likely cannot weigh risk/benefits of medical conditions, she reports that he has had bilateral lower extremity swelling for the past few months, but over the past 2 weeks it has significantly worsened, he has further difficulty ambulating due to swelling, his outpatient provider attempted to manage with 20mg lasix and then increased to 40mg lasix but the patient never took the increased dose, his symptoms ar persistent throughout the day and are extremely severe, he has developed wounds of the legs with weeping of fluid due to the swelling, she reports that the patient is frequently non-compliant with medications and medical management, he lives at home with his son and daughter in law, he denies chest pain, shortness of breath, fever/chills, cough, sputum, in the ED, he was tachycardic but hemodynamically stable, afebrile, saturating well on room air, labs were significant for elevated BNP, elevated creatinine, imaging showed moderate left pleural effusion, patient was admitted to general medicine for further management          Problem/Recommendations 1:ams of unclear etiology   likely related to multiple metabolic derangements related to uremia  sleep wake cycle disruption and poor nutritional intake  would regulate sleep wake cycle  s/p peg   doing ok   increased daytime somnolence         Thank you for allowing me to participate in the care of this patient. Will continue to follow patient periodically. Please do not hesitate to call me if you have any  questions or if there has been a change in patients neurological status     ________________  Lily Fang MD  [facility]  [phone]      33 minutes spent on total encounter; more than 50 % of the visit was  spent counseling about plan of care, compliance to diet/exercise and medication regimen and or  coordinating care by the attending physician.      It is advised that stroke patients follow up with ZACH Albarran @ [phone] in 1- 2 weeks.   Others please follow up with Dr. Michael Nissenbaum [phone]

## 2022-01-12 NOTE — PROGRESS NOTE ADULT - SUBJECTIVE AND OBJECTIVE BOX
[facility] [phone]        Patient seen and examined in bed.  He was the same         MEDICATIONS  (STANDING):  atorvastatin 80 milliGRAM(s) Oral at bedtime  carbidopa/levodopa  25/100 2.5 Tablet(s) Oral <User Schedule>  carbidopa/levodopa  25/100 2 Tablet(s) Oral <User Schedule>  chlorhexidine 4% Liquid 1 Application(s) Topical <User Schedule>  cholecalciferol 1000 Unit(s) Oral daily  cinacalcet 60 milliGRAM(s) Oral daily  dextrose 40% Gel 15 Gram(s) Oral once  dextrose 5%. 1000 milliLiter(s) (50 mL/Hr) IV Continuous <Continuous>  dextrose 5%. 1000 milliLiter(s) (100 mL/Hr) IV Continuous <Continuous>  dextrose 50% Injectable 25 Gram(s) IV Push once  dextrose 50% Injectable 12.5 Gram(s) IV Push once  dextrose 50% Injectable 25 Gram(s) IV Push once  diVALproex Sprinkle 250 milliGRAM(s) Oral three times a day  DULoxetine 20 milliGRAM(s) Oral daily  folic acid 1 milliGRAM(s) Oral daily  glucagon  Injectable 1 milliGRAM(s) IntraMuscular once  heparin   Injectable 5000 Unit(s) SubCutaneous every 12 hours  insulin lispro (ADMELOG) corrective regimen sliding scale   SubCutaneous three times a day before meals  insulin lispro (ADMELOG) corrective regimen sliding scale   SubCutaneous at bedtime  levothyroxine Injectable 25 MICROGram(s) IV Push at bedtime  memantine 5 milliGRAM(s) Oral at bedtime  metoprolol tartrate 50 milliGRAM(s) Oral two times a day  midodrine 10 milliGRAM(s) Oral <User Schedule>  mirtazapine 7.5 milliGRAM(s) Oral daily  Nephro-celeste 1 Tablet(s) Oral daily  pantoprazole  Injectable 40 milliGRAM(s) IV Push two times a day  polyethylene glycol 3350 17 Gram(s) Oral two times a day  QUEtiapine 50 milliGRAM(s) Oral at bedtime  senna Syrup 10 milliLiter(s) Oral at bedtime  trihexyphenidyl 2 milliGRAM(s) Oral three times a day      VITAL:  T(C): , Max: 36.9 (01-11-22 @ 15:21)  T(F): , Max: 98.5 (01-11-22 @ 15:21)  HR: 75 (01-12-22 @ 11:04)  BP: 113/65 (01-12-22 @ 11:04)  BP(mean): --  RR: 18 (01-12-22 @ 11:04)  SpO2: 100% (01-12-22 @ 11:04)  Wt(kg): --    I and O's:    01-11 @ 07:01  -  01-12 @ 07:00  --------------------------------------------------------  IN: 180 mL / OUT: 0 mL / NET: 180 mL    01-12 @ 07:01  -  01-12 @ 11:11  --------------------------------------------------------  IN: 120 mL / OUT: 0 mL / NET: 120 mL          PHYSICAL EXAM:    Constitutional: NAD  Neck:  No JVD  Respiratory: CTAB/L  Cardiovascular: S1 and S2  Gastrointestinal: BS+, soft, NT/ND  Extremities: No peripheral edema  Neurological:  , no focal deficits  Psychiatric: Normal mood, normal affect  : No Javier  Skin: No rashes  Access: perm cath and avf     LABS:                        8.7    9.11  )-----------( 134      ( 12 Jan 2022 08:36 )             29.6     01-12    136  |  99  |  37<H>  ----------------------------<  122<H>  3.6   |  25  |  3.76<H>    Ca    9.8      12 Jan 2022 08:36  Phos  3.5     01-12  Mg     2.1     01-12            Urine Studies:          RADIOLOGY & ADDITIONAL STUDIES:

## 2022-01-12 NOTE — PROGRESS NOTE ADULT - ASSESSMENT
70yo M w/ PMHx of CAD (s/p CABG 2019), CKD (unknown stage), DM2, Parkinson's Disease, HTN, depression presents with bilateral leg swelling  ESRD   Severe LV dysfunction; A flutter    + COVID      1 Palliation - Intermittent confusion ;   Seroquel 50mg po qHS  2 Renal- dependent on hemodialysis         HD in am        BP support with Midodrine 10 mg   3 CVS- Lopressor for heart rate control   4 Anemia - s/p Retacrit 4000 units x1 dose-    5 Vasc - s/p  LUE AVF--Immature (this will take weeks to months to mature) ;    6 GI-Feeds to start     Eventual rehab     Sayed [facility]   [phone]

## 2022-01-12 NOTE — PROGRESS NOTE ADULT - ASSESSMENT
Assessment  DMT2: 71y Male with DM T2 with hyperglycemia, A1C 6.4%, was on insulin at home, on insulin coverage, blood sugars improving, PEG.  Hypothyroidism: Patient has no history thyroid disease, was not on any thyroid supplements, subclinical with low-normal FT4, lethargic, started on synthroid 25 mcg po daily, repeat FT4 trending down, increased to synthroid  50 mcg po daily, FT4 improving.  Hypercalcemia: Started on Sensipar 60mg daily, Ca fluctuating/remains elevated..  CHF: on medications, stable, monitored.  HTN: Controlled,  on antihypertensive medications.  Parkinsons: on meds, monitored.  CKD: Monitor labs/BMP      Dr Ayers  Cell : [phone]

## 2022-01-12 NOTE — PROGRESS NOTE ADULT - SUBJECTIVE AND OBJECTIVE BOX
Chief Complaint:  Patient is a 71y old  Male who presents with a chief complaint of leg swelling (2022 11:11)      Date of service 22 @ 11:56      Interval Events:   Patient seen and examined. Sleeping in bed, appears comfortable.     Hospital Medications:  acetaminophen     Tablet .. 650 milliGRAM(s) Oral every 6 hours PRN  albuterol/ipratropium for Nebulization 3 milliLiter(s) Nebulizer every 6 hours PRN  atorvastatin 80 milliGRAM(s) Oral at bedtime  carbidopa/levodopa  25/100 2.5 Tablet(s) Oral <User Schedule>  carbidopa/levodopa  25/100 2 Tablet(s) Oral <User Schedule>  chlorhexidine 4% Liquid 1 Application(s) Topical <User Schedule>  cholecalciferol 1000 Unit(s) Oral daily  cinacalcet 60 milliGRAM(s) Oral daily  dextrose 40% Gel 15 Gram(s) Oral once  dextrose 5%. 1000 milliLiter(s) IV Continuous <Continuous>  dextrose 5%. 1000 milliLiter(s) IV Continuous <Continuous>  dextrose 50% Injectable 25 Gram(s) IV Push once  dextrose 50% Injectable 12.5 Gram(s) IV Push once  dextrose 50% Injectable 25 Gram(s) IV Push once  diVALproex Sprinkle 125 milliGRAM(s) Oral every 8 hours PRN  diVALproex Sprinkle 250 milliGRAM(s) Oral three times a day  DULoxetine 20 milliGRAM(s) Oral daily  folic acid 1 milliGRAM(s) Oral daily  glucagon  Injectable 1 milliGRAM(s) IntraMuscular once  guaiFENesin Oral Liquid (Sugar-Free) 200 milliGRAM(s) Oral every 6 hours PRN  heparin   Injectable 5000 Unit(s) SubCutaneous every 12 hours  insulin lispro (ADMELOG) corrective regimen sliding scale   SubCutaneous three times a day before meals  insulin lispro (ADMELOG) corrective regimen sliding scale   SubCutaneous at bedtime  levothyroxine Injectable 25 MICROGram(s) IV Push at bedtime  memantine 5 milliGRAM(s) Oral at bedtime  metoprolol tartrate 50 milliGRAM(s) Oral two times a day  midodrine 10 milliGRAM(s) Oral <User Schedule>  mirtazapine 7.5 milliGRAM(s) Oral daily  Nephro-celeste 1 Tablet(s) Oral daily  ondansetron Injectable 4 milliGRAM(s) IV Push once PRN  pantoprazole  Injectable 40 milliGRAM(s) IV Push two times a day  polyethylene glycol 3350 17 Gram(s) Oral two times a day  QUEtiapine 12.5 milliGRAM(s) Oral every 6 hours PRN  QUEtiapine 50 milliGRAM(s) Oral at bedtime  senna Syrup 10 milliLiter(s) Oral at bedtime  sodium chloride 0.9% lock flush 10 milliLiter(s) IV Push every 1 hour PRN  trihexyphenidyl 2 milliGRAM(s) Oral three times a day        Review of Systems:  unable to obtain    PHYSICAL EXAM:   Vital Signs:  Vital Signs Last 24 Hrs  T(C): 36.3 (2022 11:04), Max: 36.9 (2022 15:21)  T(F): 97.4 (2022 11:04), Max: 98.5 (2022 15:21)  HR: 75 (2022 11:04) (75 - 113)  BP: 113/65 (2022 11:04) (102/62 - 129/77)  BP(mean): --  RR: 18 (2022 11:04) (17 - 18)  SpO2: 100% (2022 11:04) (95% - 100%)  Daily     Daily Weight in k.1 (2022 17:55)      PHYSICAL EXAM:     GENERAL:  Appears stated age, well-groomed, well-nourished, no distress  HEENT:  NC/AT,  conjunctivae anicteric, clear and pink,   NECK: supple, trachea midline  CHEST:  Full & symmetric excursion, no increased effort, breath sounds clear  HEART:  Regular rhythm, no JVD  ABDOMEN:  Soft, non-tender, non-distended, normoactive bowel sounds,  no masses , no hepatosplenomegaly  EXTREMITIES:  no cyanosis,clubbing or edema  SKIN:  No rash, erythema, or, ecchymoses, no jaundice  NEURO:  non-focal, no asterixis  PSYCH: disoriented to place and time  RECTAL: Deferred      LABS Personally reviewed by me:                        8.7    9.11  )-----------( 134      ( 2022 08:36 )             29.6     Mean Cell Volume: 108.8 fl (22 @ 08:36)        136  |  99  |  37<H>  ----------------------------<  122<H>  3.6   |  25  |  3.76<H>    Ca    9.8      2022 08:36  Phos  3.5       Mg     2.1                                         8.7    9.11  )-----------( 134      ( 2022 08:36 )             29.6                         9.5    9.07  )-----------( 147      ( 2022 06:18 )             32.6                         10.1   8.17  )-----------( 179      ( 10 Paul 2022 11:54 )             34.4                         11.1   7.45  )-----------( 173      ( 10 Paul 2022 11:52 )             38.8       Imaging personally reviewed by me:

## 2022-01-12 NOTE — CHART NOTE - NSCHARTNOTEFT_GEN_A_CORE
Nutrition Follow Up Note  Patient seen for: malnutrition follow-up + consult for tube feeding. Chart reviewed, events noted.     "72yo M w/ PMHx of CAD (s/p CABG ), CKD (unknown stage), DM2, Parkinson's Disease, HTN, depression presents with new onset acute heart failure exacerbation, NSTEMI, started on hep gtt, developed bl RP bleed, acute anemia. sp MICU course for hemorrhagic shock, 10/28 sp IR embolization l4 and l5 lumbar artery. sp permacath and AVF.    Source: [x] Patient       [x] Medical Record        [] RN        [] Family at bedside       [] Other:    -If unable to interview patient: [] Trach/Vent/BiPAP  [] Disoriented/confused/inappropriate to interview    Diet Order:   Diet, Regular:   Supplement Feeding Modality:  Oral  Nepro Cans or Servings Per Day:  1       Frequency:  Daily  Ensure Pudding Cans or Servings Per Day:  1       Frequency:  Three Times a day (22)    - Is current order appropriate/adequate? [] Yes  [x]  No:    - PO intake :   [] >75%  Adequate    [] 50-75%  Fair       [x] <50%  Poor    - Nutrition-related concerns:      - s/p PEG 1/10, may initiate TF per GI      - Per chart, pt ordered for SSI admelog, sinemet, Synthroid (IV), remeron, Nephro-celeste, folic acid, and Vitamin D3      - Per chart, Sinemet is ordered for 06:00, 14:00, & 22:00.      - Of note, RD visually observed pt with Severe buccal fat loss and Severe temporalis muscle wasting upon visit today.        GI: Denies nausea, vomiting, constipation, diarrhea.  Last BM  per chart.   Bowel Regimen? [x] Yes (miralax, senna)  [] No      Weights:   Daily Weight in k.1 (), 64.1 (), 61.5 (01-10), 66.6 (-), 67.1 (-), 69.3 (-),  69.8 (-), 68.6 (-),  68.6 (-),  70.4 (-),  71 (-),  71 (12-23).  Dosing wt: 96.4 kg (12-14)  Noted with progressive wt decline, likely 2/2 poor PO intake. RD to continue to monitor weight trends as able/available.     MEDICATIONS  (STANDING):  atorvastatin 80 milliGRAM(s) Oral at bedtime  carbidopa/levodopa  25/100 2.5 Tablet(s) Oral <User Schedule>  carbidopa/levodopa  25/100 2 Tablet(s) Oral <User Schedule>  chlorhexidine 4% Liquid 1 Application(s) Topical <User Schedule>  cholecalciferol 1000 Unit(s) Oral daily  cinacalcet 60 milliGRAM(s) Oral daily  dextrose 40% Gel 15 Gram(s) Oral once  dextrose 5%. 1000 milliLiter(s) (50 mL/Hr) IV Continuous <Continuous>  dextrose 5%. 1000 milliLiter(s) (100 mL/Hr) IV Continuous <Continuous>  dextrose 50% Injectable 25 Gram(s) IV Push once  dextrose 50% Injectable 12.5 Gram(s) IV Push once  dextrose 50% Injectable 25 Gram(s) IV Push once  diVALproex Sprinkle 250 milliGRAM(s) Oral three times a day  DULoxetine 20 milliGRAM(s) Oral daily  folic acid 1 milliGRAM(s) Oral daily  glucagon  Injectable 1 milliGRAM(s) IntraMuscular once  heparin   Injectable 5000 Unit(s) SubCutaneous every 12 hours  insulin lispro (ADMELOG) corrective regimen sliding scale   SubCutaneous three times a day before meals  insulin lispro (ADMELOG) corrective regimen sliding scale   SubCutaneous at bedtime  levothyroxine Injectable 25 MICROGram(s) IV Push at bedtime  memantine 5 milliGRAM(s) Oral at bedtime  metoprolol tartrate 50 milliGRAM(s) Oral two times a day  midodrine 10 milliGRAM(s) Oral <User Schedule>  mirtazapine 7.5 milliGRAM(s) Oral daily  Nephro-celeste 1 Tablet(s) Oral daily  pantoprazole  Injectable 40 milliGRAM(s) IV Push two times a day  polyethylene glycol 3350 17 Gram(s) Oral two times a day  QUEtiapine 50 milliGRAM(s) Oral at bedtime  senna Syrup 10 milliLiter(s) Oral at bedtime  trihexyphenidyl 2 milliGRAM(s) Oral three times a day    MEDICATIONS  (PRN):  acetaminophen     Tablet .. 650 milliGRAM(s) Oral every 6 hours PRN Mild Pain (1 - 3)  albuterol/ipratropium for Nebulization 3 milliLiter(s) Nebulizer every 6 hours PRN Shortness of Breath and/or Wheezing  diVALproex Sprinkle 125 milliGRAM(s) Oral every 8 hours PRN Agitation  guaiFENesin Oral Liquid (Sugar-Free) 200 milliGRAM(s) Oral every 6 hours PRN Cough  ondansetron Injectable 4 milliGRAM(s) IV Push once PRN Nausea and/or Vomiting  QUEtiapine 12.5 milliGRAM(s) Oral every 6 hours PRN agitation  sodium chloride 0.9% lock flush 10 milliLiter(s) IV Push every 1 hour PRN Pre/post blood products, medications, blood draw, and to maintain line patency      Pertinent Labs:    @ 08:36: Na 136, BUN 37<H>, Cr 3.76<H>, <H>, K+ 3.6, Phos 3.5, Mg 2.1    A1C with Estimated Average Glucose Result: 6.4 % (10-22-21 @ 08:56)    Finger Sticks:  POCT Blood Glucose.: 143 mg/dL ( @ 11:42)  POCT Blood Glucose.: 137 mg/dL ( @ 08:07)  POCT Blood Glucose.: 174 mg/dL ( @ 20:52)  POCT Blood Glucose.: 86 mg/dL ( @ 18:54)  POCT Blood Glucose.: 80 mg/dL ( @ 18:34)  POCT Blood Glucose.: 117 mg/dL ( @ 12:48)      Skin per nursing documentation: No pressure injuries noted.  Edema per nursing documentation: none noted    Estimated Needs:   [x] no change since previous assessment  6629-2670 kcal/day (30-35 kcal/kg)   gm protein/day (1.2-1.4 g/kg)  Defer fluid needs to team  based on IBW 78 kg    Previous Nutrition Diagnosis: Increased Nutrient Needs; Food and Nutrition Related Knowledge Deficit;  Severe acute on chronic malnutrition    Nutrition Diagnosis is: [x] ongoing  [] resolved [] not applicable   Being addressed with tube feeding regimen via PEG and micronutrient supplementation    Nutrition Care Plan:  [x] In Progress  [] Achieved  [] Not applicable    New Nutrition Diagnosis: [x] Not applicable    Nutrition Interventions:     Education Provided   [x] No: Not appropriate at this time.    Recommendations:      1) Recommend start Nepro bolus @ 120 ml, increase 120 ml per bolus as tolerated to GOAL bolus 330 ml 4x daily (08:00, 12:00, 16:00, 20:00) with consideration for Sinemet administration. Defer free water flushes to team. Total regimen to provide 1320 ml total volume, 2347 kcal, 107 g pro, 960 ml free water. Provides 35 kcal/kg, 1.6 g pro/kg based on most recent weight 66.6 kg (12-30). Meets >100% RDIs.  2) Check electrolytes (K+, Phos, Mg) and monitor q12 hours as able, as pt is at risk of refeeding syndrome 2/2 prolonged inadequate energy intake. Replete aggressively PRN. Monitor blood glucose.  3) Consider addition of thiamine supplement, pending no medical contraindications, for risk of refeeding syndrome. Spoke to YOSEF Bautista regarding this. Continue Nephro-celeste, Vitamin D3, and Folic acid if no medical contraindications as medically appropriate.  4) Monitor EN tolerance, skin integrity, labs, weight.  5) RD remains available to adjust EN Regimen as needed.  6) malnutrition alert in chart     Monitoring and Evaluation:   Continue to monitor nutritional intake, tolerance to diet prescription, weights, labs, skin integrity    RD remains available upon request and will follow up per protocol  Maribel Gifford RDN, CDN Pager #[phone]

## 2022-01-12 NOTE — PROGRESS NOTE ADULT - SUBJECTIVE AND OBJECTIVE BOX
Chief complaint    Patient is a 71y old  Male who presents with a chief complaint of leg swelling (12 Jan 2022 14:34)   Review of systems  Patient in bed, appears comfortable.    Labs and Fingersticks  CAPILLARY BLOOD GLUCOSE      POCT Blood Glucose.: 120 mg/dL (12 Jan 2022 17:22)  POCT Blood Glucose.: 143 mg/dL (12 Jan 2022 11:42)  POCT Blood Glucose.: 137 mg/dL (12 Jan 2022 08:07)  POCT Blood Glucose.: 174 mg/dL (11 Jan 2022 20:52)      Anion Gap, Serum: 12 (01-12 @ 08:36)  Anion Gap, Serum: 17 (01-11 @ 06:18)      Calcium, Total Serum: 9.8 (01-12 @ 08:36)  Calcium, Total Serum: 10.3 (01-11 @ 06:18)          01-12    136  |  99  |  37<H>  ----------------------------<  122<H>  3.6   |  25  |  3.76<H>    Ca    9.8      12 Jan 2022 08:36  Phos  3.5     01-12  Mg     2.1     01-12                          8.7    9.11  )-----------( 134      ( 12 Jan 2022 08:36 )             29.6     Medications  MEDICATIONS  (STANDING):  atorvastatin 80 milliGRAM(s) Oral at bedtime  carbidopa/levodopa  25/100 2.5 Tablet(s) Oral <User Schedule>  carbidopa/levodopa  25/100 2 Tablet(s) Oral <User Schedule>  chlorhexidine 4% Liquid 1 Application(s) Topical <User Schedule>  cholecalciferol 1000 Unit(s) Oral daily  cinacalcet 60 milliGRAM(s) Oral daily  dextrose 40% Gel 15 Gram(s) Oral once  dextrose 5%. 1000 milliLiter(s) (50 mL/Hr) IV Continuous <Continuous>  dextrose 5%. 1000 milliLiter(s) (100 mL/Hr) IV Continuous <Continuous>  dextrose 50% Injectable 25 Gram(s) IV Push once  dextrose 50% Injectable 12.5 Gram(s) IV Push once  dextrose 50% Injectable 25 Gram(s) IV Push once  diVALproex Sprinkle 250 milliGRAM(s) Oral three times a day  DULoxetine 20 milliGRAM(s) Oral daily  folic acid 1 milliGRAM(s) Oral daily  glucagon  Injectable 1 milliGRAM(s) IntraMuscular once  heparin   Injectable 5000 Unit(s) SubCutaneous every 12 hours  insulin lispro (ADMELOG) corrective regimen sliding scale   SubCutaneous three times a day before meals  insulin lispro (ADMELOG) corrective regimen sliding scale   SubCutaneous at bedtime  levothyroxine Injectable 25 MICROGram(s) IV Push at bedtime  memantine 5 milliGRAM(s) Oral at bedtime  metoprolol tartrate 50 milliGRAM(s) Oral two times a day  midodrine 10 milliGRAM(s) Oral <User Schedule>  mirtazapine 7.5 milliGRAM(s) Oral daily  Nephro-celeste 1 Tablet(s) Oral daily  pantoprazole  Injectable 40 milliGRAM(s) IV Push two times a day  polyethylene glycol 3350 17 Gram(s) Oral two times a day  QUEtiapine 50 milliGRAM(s) Oral at bedtime  senna Syrup 10 milliLiter(s) Oral at bedtime  trihexyphenidyl 2 milliGRAM(s) Oral three times a day      Physical Exam  General: Patient comfortable in bed  Vital Signs Last 12 Hrs  T(F): 97.4 (01-12-22 @ 11:04), Max: 97.4 (01-12-22 @ 11:04)  HR: 75 (01-12-22 @ 11:04) (75 - 75)  BP: 113/65 (01-12-22 @ 11:04) (113/65 - 113/65)  BP(mean): --  RR: 18 (01-12-22 @ 11:04) (18 - 18)  SpO2: 100% (01-12-22 @ 11:04) (100% - 100%)  Neck: No palpable thyroid nodules.  CVS: S1S2, No murmurs  Respiratory: No wheezing, no crepitations  GI: Abdomen soft, bowel sounds positive  Musculoskeletal:  edema lower extremities.     Diagnostics

## 2022-01-12 NOTE — PROGRESS NOTE ADULT - ASSESSMENT
71 M w volume overload, GI consulted for drop in hgb    1. CHF per cardio    2. ABBY on CKD per renal  HD via permacath per renal, midodrine during dialysis  sp L AVF, awaiting maturation     3. Drop in hgb, no overt GI Bleed, no bleeding around PEG site  -continue to trend CBCs    4. RP bleed  s/p Abdominal Angiography and Embolization on 10/29/2021 by Interventional radiology of l4and l5 lumbar artery  now h/h stable     5. Failure to thrive  -s/p PEG yesterday 1/10 via endoscopy   -maintain abdominal binder and mittens   -may initiate tube feeds     6. stress duodenal ulcers  -PPI BID  -stool for h-pylori testing      Attending supervision statement: I have personally seen and examined the patient. I fully participated in the care of this patient. I have made amendments to the documentation where necessary, and agree with the history, physical exam, and plan as outlined by the ACP.    [facility]  Gastroenterology and Hepatology  [address] [place], [place]  Office: [phone]  Cell: [phone]

## 2022-01-13 NOTE — PROGRESS NOTE ADULT - ASSESSMENT
70yo M w/ PMHx of CAD (s/p CABG 2019), CKD (unknown stage), DM2, Parkinson's Disease, HTN, depression presents with bilateral leg swelling  ESRD   Severe LV dysfunction; A flutter    + COVID      1 Palliation - Intermittent confusion ;   Seroquel 50mg po qHS  2 Renal- dependent on hemodialysis         HD today       BP support with Midodrine 10 mg   3 CVS- Lopressor for heart rate control   4 Anemia - s/p Retacrit 4000 units x1 dose-    5 Vasc - s/p  LUE AVF--Immature (this will take weeks to months to mature) ;    6 GI-Nephro     Eventual rehab     [facility]   [phone]

## 2022-01-13 NOTE — PROGRESS NOTE ADULT - SUBJECTIVE AND OBJECTIVE BOX
Patient is a 71y old  Male who presents with a chief complaint of leg swelling (13 Jan 2022 14:00)      INTERVAL HPI/OVERNIGHT EVENTS: noted  pt seen and examined this am   events noted  feels well      Vital Signs Last 24 Hrs  T(C): 36.6 (13 Jan 2022 20:59), Max: 36.6 (13 Jan 2022 04:00)  T(F): 97.9 (13 Jan 2022 20:59), Max: 97.9 (13 Jan 2022 20:59)  HR: 98 (13 Jan 2022 20:59) (62 - 98)  BP: 118/66 (13 Jan 2022 20:59) (99/64 - 118/66)  BP(mean): --  RR: 18 (13 Jan 2022 20:59) (18 - 18)  SpO2: 98% (13 Jan 2022 20:59) (95% - 100%)    acetaminophen     Tablet .. 650 milliGRAM(s) Oral every 6 hours PRN  albuterol/ipratropium for Nebulization 3 milliLiter(s) Nebulizer every 6 hours PRN  atorvastatin 80 milliGRAM(s) Oral at bedtime  carbidopa/levodopa  25/100 2.5 Tablet(s) Oral <User Schedule>  carbidopa/levodopa  25/100 2 Tablet(s) Oral <User Schedule>  chlorhexidine 4% Liquid 1 Application(s) Topical <User Schedule>  cholecalciferol 1000 Unit(s) Oral daily  cinacalcet 60 milliGRAM(s) Oral daily  dextrose 40% Gel 15 Gram(s) Oral once  dextrose 5%. 1000 milliLiter(s) IV Continuous <Continuous>  dextrose 5%. 1000 milliLiter(s) IV Continuous <Continuous>  dextrose 50% Injectable 25 Gram(s) IV Push once  dextrose 50% Injectable 12.5 Gram(s) IV Push once  dextrose 50% Injectable 25 Gram(s) IV Push once  diVALproex Sprinkle 125 milliGRAM(s) Oral every 8 hours PRN  diVALproex Sprinkle 250 milliGRAM(s) Oral three times a day  DULoxetine 20 milliGRAM(s) Oral daily  folic acid 1 milliGRAM(s) Oral daily  glucagon  Injectable 1 milliGRAM(s) IntraMuscular once  guaiFENesin Oral Liquid (Sugar-Free) 200 milliGRAM(s) Oral every 6 hours PRN  heparin   Injectable 5000 Unit(s) SubCutaneous every 12 hours  insulin lispro (ADMELOG) corrective regimen sliding scale   SubCutaneous three times a day before meals  insulin lispro (ADMELOG) corrective regimen sliding scale   SubCutaneous at bedtime  levothyroxine Injectable 25 MICROGram(s) IV Push at bedtime  memantine 5 milliGRAM(s) Oral at bedtime  metoprolol tartrate 50 milliGRAM(s) Oral two times a day  midodrine 10 milliGRAM(s) Oral <User Schedule>  mirtazapine 7.5 milliGRAM(s) Oral daily  Nephro-celeste 1 Tablet(s) Oral daily  ondansetron Injectable 4 milliGRAM(s) IV Push once PRN  pantoprazole  Injectable 40 milliGRAM(s) IV Push two times a day  polyethylene glycol 3350 17 Gram(s) Oral two times a day  QUEtiapine 50 milliGRAM(s) Oral at bedtime  QUEtiapine 12.5 milliGRAM(s) Oral every 6 hours PRN  senna Syrup 10 milliLiter(s) Oral at bedtime  sodium chloride 0.9% lock flush 10 milliLiter(s) IV Push every 1 hour PRN  trihexyphenidyl 2 milliGRAM(s) Oral three times a day      PHYSICAL EXAM:  GENERAL: NAD,   EYES: conjunctiva and sclera clear  ENMT: Moist mucous membranes  NECK: Supple, No JVD, Normal thyroid  CHEST/LUNG: non labored, cta b/l  HEART: Regular rate and rhythm; No murmurs, rubs, or gallops  ABDOMEN: Soft, Nontender, Nondistended; Bowel sounds present  EXTREMITIES:  2+ Peripheral Pulses, No clubbing, cyanosis, or edema  LYMPH: No lymphadenopathy noted  SKIN: No rashes or lesions    Consultant(s) Notes Reviewed:  [x ] YES  [ ] NO  Care Discussed with Consultants/Other Providers [ x] YES  [ ] NO    LABS:                        8.5    9.49  )-----------( 142      ( 13 Jan 2022 07:02 )             28.8     01-13    133<L>  |  97  |  47<H>  ----------------------------<  91  4.2   |  18<L>  |  4.76<H>    Ca    10.2      13 Jan 2022 07:02  Phos  3.7     01-13  Mg     2.2     01-13          CAPILLARY BLOOD GLUCOSE      POCT Blood Glucose.: 89 mg/dL (13 Jan 2022 21:27)  POCT Blood Glucose.: 139 mg/dL (13 Jan 2022 17:06)  POCT Blood Glucose.: 93 mg/dL (13 Jan 2022 11:59)  POCT Blood Glucose.: 126 mg/dL (13 Jan 2022 07:54)              RADIOLOGY & ADDITIONAL TESTS:    Imaging Personally Reviewed:  [x ] YES  [ ] NO

## 2022-01-13 NOTE — PROGRESS NOTE ADULT - SUBJECTIVE AND OBJECTIVE BOX
Chief complaint    Patient is a 71y old  Male who presents with a chief complaint of leg swelling (12 Jan 2022 19:52)   Review of systems  Patient in bed, appears comfortable.    Labs and Fingersticks  CAPILLARY BLOOD GLUCOSE      POCT Blood Glucose.: 126 mg/dL (13 Jan 2022 07:54)  POCT Blood Glucose.: 126 mg/dL (12 Jan 2022 21:45)  POCT Blood Glucose.: 120 mg/dL (12 Jan 2022 17:22)  POCT Blood Glucose.: 143 mg/dL (12 Jan 2022 11:42)      Anion Gap, Serum: 18 *H* (01-13 @ 07:02)  Anion Gap, Serum: 12 (01-12 @ 08:36)      Calcium, Total Serum: 10.2 (01-13 @ 07:02)  Calcium, Total Serum: 9.8 (01-12 @ 08:36)          01-13    133<L>  |  97  |  47<H>  ----------------------------<  91  4.2   |  18<L>  |  4.76<H>    Ca    10.2      13 Jan 2022 07:02  Phos  3.7     01-13  Mg     2.2     01-13                          8.5    9.49  )-----------( 142      ( 13 Jan 2022 07:02 )             28.8     Medications  MEDICATIONS  (STANDING):  atorvastatin 80 milliGRAM(s) Oral at bedtime  carbidopa/levodopa  25/100 2.5 Tablet(s) Oral <User Schedule>  carbidopa/levodopa  25/100 2 Tablet(s) Oral <User Schedule>  chlorhexidine 4% Liquid 1 Application(s) Topical <User Schedule>  cholecalciferol 1000 Unit(s) Oral daily  cinacalcet 60 milliGRAM(s) Oral daily  dextrose 40% Gel 15 Gram(s) Oral once  dextrose 5%. 1000 milliLiter(s) (50 mL/Hr) IV Continuous <Continuous>  dextrose 5%. 1000 milliLiter(s) (100 mL/Hr) IV Continuous <Continuous>  dextrose 50% Injectable 25 Gram(s) IV Push once  dextrose 50% Injectable 12.5 Gram(s) IV Push once  dextrose 50% Injectable 25 Gram(s) IV Push once  diVALproex Sprinkle 250 milliGRAM(s) Oral three times a day  DULoxetine 20 milliGRAM(s) Oral daily  folic acid 1 milliGRAM(s) Oral daily  glucagon  Injectable 1 milliGRAM(s) IntraMuscular once  heparin   Injectable 5000 Unit(s) SubCutaneous every 12 hours  insulin lispro (ADMELOG) corrective regimen sliding scale   SubCutaneous three times a day before meals  insulin lispro (ADMELOG) corrective regimen sliding scale   SubCutaneous at bedtime  levothyroxine Injectable 25 MICROGram(s) IV Push at bedtime  memantine 5 milliGRAM(s) Oral at bedtime  metoprolol tartrate 50 milliGRAM(s) Oral two times a day  midodrine 10 milliGRAM(s) Oral <User Schedule>  mirtazapine 7.5 milliGRAM(s) Oral daily  Nephro-celeste 1 Tablet(s) Oral daily  pantoprazole  Injectable 40 milliGRAM(s) IV Push two times a day  polyethylene glycol 3350 17 Gram(s) Oral two times a day  QUEtiapine 50 milliGRAM(s) Oral at bedtime  senna Syrup 10 milliLiter(s) Oral at bedtime  trihexyphenidyl 2 milliGRAM(s) Oral three times a day      Physical Exam  General: Patient comfortable in bed  Vital Signs Last 12 Hrs  T(F): 97.2 (01-13-22 @ 08:50), Max: 97.8 (01-13-22 @ 04:00)  HR: 88 (01-13-22 @ 08:50) (80 - 88)  BP: 104/58 (01-13-22 @ 08:50) (99/64 - 104/58)  BP(mean): --  RR: 18 (01-13-22 @ 08:50) (18 - 18)  SpO2: 95% (01-13-22 @ 08:50) (95% - 97%)  Neck: No palpable thyroid nodules.  CVS: S1S2, No murmurs  Respiratory: No wheezing, no crepitations  GI: Abdomen soft, bowel sounds positive  Musculoskeletal:  edema lower extremities.     Diagnostics

## 2022-01-13 NOTE — PROGRESS NOTE ADULT - SUBJECTIVE AND OBJECTIVE BOX
Chief Complaint:  Patient is a 71y old  Male who presents with a chief complaint of leg swelling (13 Jan 2022 10:43)      Date of service 01-13-22 @ 14:02      Interval Events:   Patient seen and examined. S/p HD today.     Hospital Medications:  acetaminophen     Tablet .. 650 milliGRAM(s) Oral every 6 hours PRN  albuterol/ipratropium for Nebulization 3 milliLiter(s) Nebulizer every 6 hours PRN  atorvastatin 80 milliGRAM(s) Oral at bedtime  carbidopa/levodopa  25/100 2.5 Tablet(s) Oral <User Schedule>  carbidopa/levodopa  25/100 2 Tablet(s) Oral <User Schedule>  chlorhexidine 4% Liquid 1 Application(s) Topical <User Schedule>  cholecalciferol 1000 Unit(s) Oral daily  cinacalcet 60 milliGRAM(s) Oral daily  dextrose 40% Gel 15 Gram(s) Oral once  dextrose 5%. 1000 milliLiter(s) IV Continuous <Continuous>  dextrose 5%. 1000 milliLiter(s) IV Continuous <Continuous>  dextrose 50% Injectable 25 Gram(s) IV Push once  dextrose 50% Injectable 12.5 Gram(s) IV Push once  dextrose 50% Injectable 25 Gram(s) IV Push once  diVALproex Sprinkle 125 milliGRAM(s) Oral every 8 hours PRN  diVALproex Sprinkle 250 milliGRAM(s) Oral three times a day  DULoxetine 20 milliGRAM(s) Oral daily  folic acid 1 milliGRAM(s) Oral daily  glucagon  Injectable 1 milliGRAM(s) IntraMuscular once  guaiFENesin Oral Liquid (Sugar-Free) 200 milliGRAM(s) Oral every 6 hours PRN  heparin   Injectable 5000 Unit(s) SubCutaneous every 12 hours  insulin lispro (ADMELOG) corrective regimen sliding scale   SubCutaneous three times a day before meals  insulin lispro (ADMELOG) corrective regimen sliding scale   SubCutaneous at bedtime  levothyroxine Injectable 25 MICROGram(s) IV Push at bedtime  memantine 5 milliGRAM(s) Oral at bedtime  metoprolol tartrate 50 milliGRAM(s) Oral two times a day  midodrine 10 milliGRAM(s) Oral <User Schedule>  mirtazapine 7.5 milliGRAM(s) Oral daily  Nephro-celeste 1 Tablet(s) Oral daily  ondansetron Injectable 4 milliGRAM(s) IV Push once PRN  pantoprazole  Injectable 40 milliGRAM(s) IV Push two times a day  polyethylene glycol 3350 17 Gram(s) Oral two times a day  QUEtiapine 50 milliGRAM(s) Oral at bedtime  QUEtiapine 12.5 milliGRAM(s) Oral every 6 hours PRN  senna Syrup 10 milliLiter(s) Oral at bedtime  sodium chloride 0.9% lock flush 10 milliLiter(s) IV Push every 1 hour PRN  trihexyphenidyl 2 milliGRAM(s) Oral three times a day        Review of Systems:  General:  No wt loss, fevers, chills, night sweats, fatigue,   Eyes:  Good vision, no reported pain  ENT:  No sore throat, pain, runny nose, dysphagia  CV:  No pain, palpitations, hypo/hypertension  Resp:  No dyspnea, cough, tachypnea, wheezing  GI:  See HPI  :  No pain, bleeding, incontinence, nocturia  Muscle:  No pain, weakness  Neuro:  No weakness, tingling, memory problems  Psych:  No fatigue, insomnia, mood problems, depression  Endocrine:  No polyuria, polydipsia, cold/heat intolerance  Heme:  No petechiae, ecchymosis, easy bruisability  Integumentary:  No rash, edema    PHYSICAL EXAM:   Vital Signs:  Vital Signs Last 24 Hrs  T(C): 36.3 (13 Jan 2022 12:00), Max: 36.6 (12 Jan 2022 21:53)  T(F): 97.3 (13 Jan 2022 12:00), Max: 97.9 (12 Jan 2022 21:53)  HR: 92 (13 Jan 2022 12:00) (62 - 92)  BP: 111/64 (13 Jan 2022 12:00) (99/64 - 122/74)  BP(mean): --  RR: 18 (13 Jan 2022 12:00) (18 - 18)  SpO2: 98% (13 Jan 2022 12:00) (95% - 100%)  Daily     Daily       PHYSICAL EXAM:     GENERAL:  Appears stated age, well-groomed, well-nourished, no distress  HEENT:  NC/AT,  conjunctivae anicteric, clear and pink,   NECK: supple, trachea midline  CHEST:  Full & symmetric excursion, no increased effort, breath sounds clear  HEART:  Regular rhythm, no JVD  ABDOMEN:  Soft, non-tender, non-distended, normoactive bowel sounds,  no masses , no hepatosplenomegaly  EXTREMITIES:  no cyanosis,clubbing or edema  SKIN:  No rash, erythema, or, ecchymoses, no jaundice  NEURO:  Alert, non-focal, no asterixis  PSYCH: disoriented to place and time  RECTAL: Deferred      LABS Personally reviewed by me:                        8.5    9.49  )-----------( 142      ( 13 Jan 2022 07:02 )             28.8     Mean Cell Volume: 107.9 fl (01-13-22 @ 07:02)    01-13    133<L>  |  97  |  47<H>  ----------------------------<  91  4.2   |  18<L>  |  4.76<H>    Ca    10.2      13 Jan 2022 07:02  Phos  3.7     01-13  Mg     2.2     01-13                                    8.5    9.49  )-----------( 142      ( 13 Jan 2022 07:02 )             28.8                         8.7    9.11  )-----------( 134      ( 12 Jan 2022 08:36 )             29.6                         9.5    9.07  )-----------( 147      ( 11 Jan 2022 06:18 )             32.6       Imaging personally reviewed by me:

## 2022-01-13 NOTE — PROGRESS NOTE ADULT - ASSESSMENT
Assessment  DMT2: 71y Male with DM T2 with hyperglycemia, A1C 6.4%, was on insulin at home, on insulin coverage, sugars trending down now, no hypoglycemic events,  PEG.  Hypothyroidism: Patient has no history thyroid disease, was not on any thyroid supplements, subclinical with low-normal FT4, lethargic, started on synthroid 25 mcg po daily, repeat FT4 trending down, increased to synthroid  50 mcg po daily, FT4 improving.  Hypercalcemia: Started on Sensipar 60mg daily, Ca fluctuating/remains elevated..  CHF: on medications, stable, monitored.  HTN: Controlled,  on antihypertensive medications.  Parkinsons: on meds, monitored.  CKD: Monitor labs/BMP      Dr Ayers  Cell : [phone]

## 2022-01-13 NOTE — PROGRESS NOTE ADULT - ASSESSMENT
70yo M w/ PMHx of CAD (s/p CABG 2019), CKD (unknown stage), DM2, Parkinson's Disease, HTN, depression presents with new onset acute heart failure exacerbation, NSTEMI, started on hep gtt, developed bl RP bleed, acute anemia. sp MICU course for hemorrhagic shock, 10/28 sp IR embolization l4 and l5 lumbar artery. sp permacath and AVF.    # Acute on chronic systolic and diastolic heart failure  # NSTEMI, medical management  # ESRD, new HD  # Atrial flutter  # acute hypoxic resp failure  # hemorrhagic shock, left RP hematoma, acute blood loss anemia sp embolization l4 and l5 lumbar artery 10/28  # lupus anticoagulant +  HD via permacath per renal, midodrine during dialysis  sp L AVF   12/13 pt pulled out permacath, replaced with new permacath 12/14  per renal, AVF needs more time to mature    # Parkinson's disease   # delirium  cont on depakote and cymbalta  c/w trihexyphenidyl, carbidopa/levodopa   cont seroquel 50mg qhs     # DM2 (diabetes mellitus, type 2)  per endo  hba1c 6.4    # CAD (coronary artery disease)  # HTN  lopressor, atorvastatin  asa on hold    # FTT  sp PEG via endoscopy  nutrition for tube feeds  mittens and abd binder    # surveillance COVID positive 12/28  asymptomatic off isolation  sating well on RA  doppler LE ro dvt    DVT ppx hsq    DNR/DNI    dispo: fu doppler -LE neg dvt,   started tube feeds  dc planning    [facility]  [phone]

## 2022-01-13 NOTE — PROGRESS NOTE ADULT - SUBJECTIVE AND OBJECTIVE BOX
NEPHROLOGY[facility] [phone]        Patient seen and examined in bed.  He was the same      MEDICATIONS  (STANDING):  atorvastatin 80 milliGRAM(s) Oral at bedtime  carbidopa/levodopa  25/100 2.5 Tablet(s) Oral <User Schedule>  carbidopa/levodopa  25/100 2 Tablet(s) Oral <User Schedule>  chlorhexidine 4% Liquid 1 Application(s) Topical <User Schedule>  cholecalciferol 1000 Unit(s) Oral daily  cinacalcet 60 milliGRAM(s) Oral daily  dextrose 40% Gel 15 Gram(s) Oral once  dextrose 5%. 1000 milliLiter(s) (50 mL/Hr) IV Continuous <Continuous>  dextrose 5%. 1000 milliLiter(s) (100 mL/Hr) IV Continuous <Continuous>  dextrose 50% Injectable 25 Gram(s) IV Push once  dextrose 50% Injectable 12.5 Gram(s) IV Push once  dextrose 50% Injectable 25 Gram(s) IV Push once  diVALproex Sprinkle 250 milliGRAM(s) Oral three times a day  DULoxetine 20 milliGRAM(s) Oral daily  folic acid 1 milliGRAM(s) Oral daily  glucagon  Injectable 1 milliGRAM(s) IntraMuscular once  heparin   Injectable 5000 Unit(s) SubCutaneous every 12 hours  insulin lispro (ADMELOG) corrective regimen sliding scale   SubCutaneous three times a day before meals  insulin lispro (ADMELOG) corrective regimen sliding scale   SubCutaneous at bedtime  levothyroxine Injectable 25 MICROGram(s) IV Push at bedtime  memantine 5 milliGRAM(s) Oral at bedtime  metoprolol tartrate 50 milliGRAM(s) Oral two times a day  midodrine 10 milliGRAM(s) Oral <User Schedule>  mirtazapine 7.5 milliGRAM(s) Oral daily  Nephro-celeste 1 Tablet(s) Oral daily  pantoprazole  Injectable 40 milliGRAM(s) IV Push two times a day  polyethylene glycol 3350 17 Gram(s) Oral two times a day  QUEtiapine 50 milliGRAM(s) Oral at bedtime  senna Syrup 10 milliLiter(s) Oral at bedtime  trihexyphenidyl 2 milliGRAM(s) Oral three times a day      VITAL:  T(C): , Max: 36.6 (01-12-22 @ 21:53)  T(F): , Max: 97.9 (01-12-22 @ 21:53)  HR: 88 (01-13-22 @ 08:50)  BP: 104/58 (01-13-22 @ 08:50)  BP(mean): --  RR: 18 (01-13-22 @ 08:50)  SpO2: 95% (01-13-22 @ 08:50)  Wt(kg): --    I and O's:    01-12 @ 07:01  -  01-13 @ 07:00  --------------------------------------------------------  IN: 240 mL / OUT: 0 mL / NET: 240 mL          PHYSICAL EXAM:    Constitutional: NAD  Neck:  No JVD  Respiratory: CTAB/L  Cardiovascular: S1 and S2  Gastrointestinal: BS+, soft, NT/ND  Extremities: No peripheral edema  Neurological: A/O x 3, no focal deficits  Psychiatric: Normal mood, normal affect  : No Javier  Skin: No rashes  Access: perm cath and avf     LABS:                        8.5    9.49  )-----------( 142      ( 13 Jan 2022 07:02 )             28.8     01-13    133<L>  |  97  |  47<H>  ----------------------------<  91  4.2   |  18<L>  |  4.76<H>    Ca    10.2      13 Jan 2022 07:02  Phos  3.7     01-13  Mg     2.2     01-13            Urine Studies:          RADIOLOGY & ADDITIONAL STUDIES:

## 2022-01-13 NOTE — PROGRESS NOTE ADULT - ASSESSMENT
71 M w volume overload, GI consulted for drop in hgb    1. CHF per cardio    2. ABBY on CKD per renal  -s/p HD today via permacath per renal, midodrine during dialysis  -s/p L AVF, awaiting maturation     3. Drop in hgb, no overt GI Bleed, no bleeding around PEG site  -continue to trend CBCs    4. RP bleed  s/p Abdominal Angiography and Embolization on 10/29/2021 by Interventional radiology of l4and l5 lumbar artery  now h/h stable     5. Failure to thrive  -s/p PEG yesterday 1/10 via endoscopy   -maintain abdominal binder and mittens   -may initiate tube feeds     6. stress duodenal ulcers  -PPI BID  -stool for h-pylori testing    7. Constipation  -last BM unknown, abdomen soft non-distended  -ordered abd x-ray for constipation       Attending supervision statement: I have personally seen and examined the patient. I fully participated in the care of this patient. I have made amendments to the documentation where necessary, and agree with the history, physical exam, and plan as outlined by the ACP.    [facility]  Gastroenterology and Hepatology  [address] [place], [place]  Office: [phone]  Cell: [phone]   71 M w volume overload, GI consulted for drop in hgb    1. CHF per cardio    2. ABBY on CKD per renal  -s/p HD today via permacath per renal, midodrine during dialysis  -s/p L AVF, awaiting maturation     3. Drop in hgb, no overt GI Bleed, no bleeding around PEG site  -anemia s/p Retacrit   -continue to trend CBCs    4. RP bleed  s/p Abdominal Angiography and Embolization on 10/29/2021 by Interventional radiology of l4and l5 lumbar artery  now h/h stable     5. Failure to thrive  -s/p PEG yesterday 1/10 via endoscopy   -maintain abdominal binder and mittens   -may initiate tube feeds     6. stress duodenal ulcers  -PPI BID  -stool for h-pylori testing    7. Constipation  -last BM unknown, abdomen soft non-distended  -ordered abd x-ray for constipation       Attending supervision statement: I have personally seen and examined the patient. I fully participated in the care of this patient. I have made amendments to the documentation where necessary, and agree with the history, physical exam, and plan as outlined by the ACP.    [facility]  Gastroenterology and Hepatology  [address] [place], [place]  Office: [phone]  Cell: [phone]   71 M w volume overload, GI consulted for drop in hgb    1. CHF per cardio    2. ABBY on CKD per renal  -s/p HD today via permacath per renal, midodrine during dialysis  -s/p L AVF, awaiting maturation     3. Drop in hgb, no overt GI Bleed, no bleeding around PEG site  -anemia receiving Retacrit   -continue to trend CBCs    4. RP bleed  s/p Abdominal Angiography and Embolization on 10/29/2021 by Interventional radiology of l4and l5 lumbar artery  now h/h stable     5. Failure to thrive  -s/p PEG yesterday 1/10 via endoscopy   -maintain abdominal binder and mittens   -may initiate tube feeds     6. stress duodenal ulcers  -PPI BID  -stool for h-pylori testing    7. Constipation  -last BM unknown, abdomen soft non-distended  -ordered abd x-ray for constipation       Attending supervision statement: I have personally seen and examined the patient. I fully participated in the care of this patient. I have made amendments to the documentation where necessary, and agree with the history, physical exam, and plan as outlined by the ACP.    [facility]  Gastroenterology and Hepatology  [address] [place], [place]  Office: [phone]  Cell: [phone]

## 2022-01-14 NOTE — PROGRESS NOTE ADULT - ASSESSMENT
71 M w volume overload, GI consulted for drop in hgb    1. CHF per cardio    2. ABBY on CKD per renal  -on HD  via permacath per renal, midodrine during dialysis  -s/p L AVF, awaiting maturation     3. Drop in hgb, no overt GI Bleed, no bleeding around PEG site  -anemia receiving Retacrit   -continue to trend CBCs    4. RP bleed  s/p Abdominal Angiography and Embolization on 10/29/2021 by Interventional radiology of l4and l5 lumbar artery  now h/h stable     5. Failure to thrive  -s/p PEG yesterday 1/10 via endoscopy   -maintain abdominal binder and mittens   -may initiate tube feeds     6. stress duodenal ulcers  -PPI BID  -stool for h-pylori testing    7. Constipation  -last BM unknown, abdomen soft non-distended  -pending read of abdominal x-ray       Attending supervision statement: I have personally seen and examined the patient. I fully participated in the care of this patient. I have made amendments to the documentation where necessary, and agree with the history, physical exam, and plan as outlined by the ACP.    [facility]  Gastroenterology and Hepatology  [address] [place], [place]  Office: [phone]  Cell: [phone]   71 M w volume overload, GI consulted for drop in hgb    1. CHF per cardio    2. ABBY on CKD per renal  -on HD  via permacath per renal, midodrine during dialysis  -s/p L AVF, awaiting maturation     3. Drop in hgb, no overt GI Bleed, no bleeding around PEG site  -anemia receiving Retacrit   -continue to trend CBCs    4. RP bleed  s/p Abdominal Angiography and Embolization on 10/29/2021 by Interventional radiology of l4and l5 lumbar artery  now h/h stable     5. Failure to thrive  -s/p PEG yesterday 1/10 via endoscopy   -maintain abdominal binder and mittens   -may initiate tube feeds     6. stress duodenal ulcers  -PPI BID  -stool for h-pylori testing    7. Constipation  -last BM unknown, abdomen soft non-distended  -nonobstructive gas pattern on xray 1/13  -continue Miralax BID      Attending supervision statement: I have personally seen and examined the patient. I fully participated in the care of this patient. I have made amendments to the documentation where necessary, and agree with the history, physical exam, and plan as outlined by the ACP.    [facility]  Gastroenterology and Hepatology  [address] [place], [place]  Office: [phone]  Cell: [phone]

## 2022-01-14 NOTE — PROGRESS NOTE ADULT - SUBJECTIVE AND OBJECTIVE BOX
Chief complaint    Patient is a 71y old  Male who presents with a chief complaint of leg swelling (14 Jan 2022 14:09)   Review of systems  Patient in bed, appears comfortable.    Labs and Fingersticks  CAPILLARY BLOOD GLUCOSE      POCT Blood Glucose.: 176 mg/dL (14 Jan 2022 17:48)  POCT Blood Glucose.: 122 mg/dL (14 Jan 2022 11:53)  POCT Blood Glucose.: 160 mg/dL (14 Jan 2022 06:24)  POCT Blood Glucose.: 143 mg/dL (14 Jan 2022 00:07)  POCT Blood Glucose.: 89 mg/dL (13 Jan 2022 21:27)      Anion Gap, Serum: 10 (01-14 @ 06:48)  Anion Gap, Serum: 18 *H* (01-13 @ 07:02)      Calcium, Total Serum: 9.8 (01-14 @ 06:48)  Calcium, Total Serum: 10.2 (01-13 @ 07:02)          01-14    128<L>  |  93<L>  |  31<H>  ----------------------------<  182<H>  3.3<L>   |  25  |  3.43<H>    Ca    9.8      14 Jan 2022 06:48  Phos  3.1     01-14  Mg     2.1     01-14                          8.5    9.49  )-----------( 142      ( 13 Jan 2022 07:02 )             28.8     Medications  MEDICATIONS  (STANDING):  atorvastatin 80 milliGRAM(s) Oral at bedtime  carbidopa/levodopa  25/100 2.5 Tablet(s) Oral <User Schedule>  carbidopa/levodopa  25/100 2 Tablet(s) Oral <User Schedule>  chlorhexidine 4% Liquid 1 Application(s) Topical <User Schedule>  cholecalciferol 1000 Unit(s) Oral daily  cinacalcet 60 milliGRAM(s) Oral daily  dextrose 40% Gel 15 Gram(s) Oral once  dextrose 5%. 1000 milliLiter(s) (50 mL/Hr) IV Continuous <Continuous>  dextrose 5%. 1000 milliLiter(s) (100 mL/Hr) IV Continuous <Continuous>  dextrose 50% Injectable 25 Gram(s) IV Push once  dextrose 50% Injectable 12.5 Gram(s) IV Push once  dextrose 50% Injectable 25 Gram(s) IV Push once  diVALproex Sprinkle 250 milliGRAM(s) Oral three times a day  DULoxetine 20 milliGRAM(s) Oral daily  folic acid 1 milliGRAM(s) Oral daily  glucagon  Injectable 1 milliGRAM(s) IntraMuscular once  heparin   Injectable 5000 Unit(s) SubCutaneous every 12 hours  insulin lispro (ADMELOG) corrective regimen sliding scale   SubCutaneous three times a day before meals  insulin lispro (ADMELOG) corrective regimen sliding scale   SubCutaneous at bedtime  levothyroxine Injectable 25 MICROGram(s) IV Push at bedtime  memantine 5 milliGRAM(s) Oral at bedtime  metoprolol tartrate 50 milliGRAM(s) Oral two times a day  midodrine 10 milliGRAM(s) Oral <User Schedule>  mirtazapine 7.5 milliGRAM(s) Oral daily  Nephro-celeste 1 Tablet(s) Oral daily  pantoprazole  Injectable 40 milliGRAM(s) IV Push two times a day  polyethylene glycol 3350 17 Gram(s) Oral two times a day  potassium chloride   Powder 40 milliEquivalent(s) Oral once  QUEtiapine 50 milliGRAM(s) Oral at bedtime  senna Syrup 10 milliLiter(s) Oral at bedtime  trihexyphenidyl 2 milliGRAM(s) Oral three times a day      Physical Exam  General: Patient comfortable in bed  Vital Signs Last 12 Hrs  T(F): 97.7 (01-14-22 @ 11:16), Max: 97.7 (01-14-22 @ 11:16)  HR: 74 (01-14-22 @ 15:54) (74 - 91)  BP: 101/62 (01-14-22 @ 15:54) (101/62 - 118/68)  BP(mean): --  RR: 18 (01-14-22 @ 11:16) (18 - 18)  SpO2: 98% (01-14-22 @ 11:16) (98% - 98%)  Neck: No palpable thyroid nodules.  CVS: S1S2, No murmurs  Respiratory: No wheezing, no crepitations  GI: Abdomen soft, bowel sounds positive  Musculoskeletal:  edema lower extremities.     Diagnostics    Cortisol AM, Serum: AM Sched. Collection: 15-Paul-2022 06:00 (01-14 @ 08:01)  Free Thyroxine, Serum: AM Sched. Collection: 15-Paul-2022 06:00 (01-14 @ 08:01)  Thyroid Stimulating Hormone, Serum: AM Sched. Collection: 15-Paul-2022 06:00 (01-14 @ 08:01)

## 2022-01-14 NOTE — PROGRESS NOTE ADULT - ASSESSMENT
Assessment  DMT2: 71y Male with DM T2 with hyperglycemia, A1C 6.4%, was on insulin at home, on insulin coverage, blood sugars  are improving, no hypoglycemic events,  PEG.  Hypothyroidism: Patient has no history thyroid disease, was not on any thyroid supplements, subclinical with low-normal FT4, lethargic, started on synthroid 25 mcg po daily, repeat FT4 trending down, increased to synthroid  50 mcg po daily, FT4 improving.  Hypercalcemia: Started on Sensipar 60mg daily, Ca fluctuating/remains elevated..  CHF: on medications, stable, monitored.  HTN: Controlled,  on antihypertensive medications.  Parkinsons: on meds, monitored.  CKD: Monitor labs/BMP      Dr Ayers  Cell : [phone]

## 2022-01-14 NOTE — PROGRESS NOTE ADULT - ASSESSMENT
70yo M w/ PMHx of CAD (s/p CABG 2019), CKD (unknown stage), DM2, Parkinson's Disease, HTN, depression presents with bilateral leg swelling  ESRD   Severe LV dysfunction; A flutter    + COVID      1 Palliation - Intermittent confusion ;   Seroquel 50mg po qHS  2 Renal- dependent on hemodialysis       HD tomorrow       BP support with Midodrine 10 mg   3 CVS- Lopressor for heart rate control   4 Anemia - s/p Retacrit 79124 (1/13)   5 Vasc - s/p  LUE AVF--Immature (this will take weeks to months to mature)   6 GI-Nephro     D/C planning per primary team     Eventual rehab     [facility]   [phone]

## 2022-01-14 NOTE — PROGRESS NOTE ADULT - ASSESSMENT
72yo M w/ PMHx of CAD (s/p CABG 2019), CKD (unknown stage), DM2, Parkinson's Disease, HTN, depression presents with new onset acute heart failure exacerbation, NSTEMI, started on hep gtt, developed bl RP bleed, acute anemia. sp MICU course for hemorrhagic shock, 10/28 sp IR embolization l4 and l5 lumbar artery. sp permacath and AVF.    # Acute on chronic systolic and diastolic heart failure  # NSTEMI, medical management  # ESRD, new HD  # Atrial flutter  # acute hypoxic resp failure  # hemorrhagic shock, left RP hematoma, acute blood loss anemia sp embolization l4 and l5 lumbar artery 10/28  # lupus anticoagulant +  HD via permacath per renal, midodrine during dialysis  sp L AVF   12/13 pt pulled out permacath, replaced with new permacath 12/14  per renal, AVF needs more time to mature    # Parkinson's disease   # delirium  cont on depakote and cymbalta  c/w trihexyphenidyl, carbidopa/levodopa   cont seroquel 50mg qhs     # DM2 (diabetes mellitus, type 2)  per endo  hba1c 6.4    # CAD (coronary artery disease)  # HTN  lopressor, atorvastatin  asa on hold    # FTT  sp PEG via endoscopy  nutrition for tube feeds  mittens and abd binder    # surveillance COVID positive 12/28  asymptomatic off isolation  sating well on RA  doppler LE ro dvt    DVT ppx hsq    DNR/DNI    dispo: fu doppler -LE neg dvt,   started tube feeds  dc planning    [facility]  [phone]

## 2022-01-14 NOTE — PROGRESS NOTE ADULT - SUBJECTIVE AND OBJECTIVE BOX
NEPHROLOGY-[facility] [phone]        Patient seen and examined he had HD yesterday with no fluid removal. He is resting in bed no distress noted.         MEDICATIONS  (STANDING):  atorvastatin 80 milliGRAM(s) Oral at bedtime  carbidopa/levodopa  25/100 2.5 Tablet(s) Oral <User Schedule>  carbidopa/levodopa  25/100 2 Tablet(s) Oral <User Schedule>  chlorhexidine 4% Liquid 1 Application(s) Topical <User Schedule>  cholecalciferol 1000 Unit(s) Oral daily  cinacalcet 60 milliGRAM(s) Oral daily  dextrose 40% Gel 15 Gram(s) Oral once  dextrose 5%. 1000 milliLiter(s) (50 mL/Hr) IV Continuous <Continuous>  dextrose 5%. 1000 milliLiter(s) (100 mL/Hr) IV Continuous <Continuous>  dextrose 50% Injectable 25 Gram(s) IV Push once  dextrose 50% Injectable 12.5 Gram(s) IV Push once  dextrose 50% Injectable 25 Gram(s) IV Push once  diVALproex Sprinkle 250 milliGRAM(s) Oral three times a day  DULoxetine 20 milliGRAM(s) Oral daily  folic acid 1 milliGRAM(s) Oral daily  glucagon  Injectable 1 milliGRAM(s) IntraMuscular once  heparin   Injectable 5000 Unit(s) SubCutaneous every 12 hours  insulin lispro (ADMELOG) corrective regimen sliding scale   SubCutaneous three times a day before meals  insulin lispro (ADMELOG) corrective regimen sliding scale   SubCutaneous at bedtime  levothyroxine Injectable 25 MICROGram(s) IV Push at bedtime  memantine 5 milliGRAM(s) Oral at bedtime  metoprolol tartrate 50 milliGRAM(s) Oral two times a day  midodrine 10 milliGRAM(s) Oral <User Schedule>  mirtazapine 7.5 milliGRAM(s) Oral daily  Nephro-celeste 1 Tablet(s) Oral daily  pantoprazole  Injectable 40 milliGRAM(s) IV Push two times a day  polyethylene glycol 3350 17 Gram(s) Oral two times a day  QUEtiapine 50 milliGRAM(s) Oral at bedtime  senna Syrup 10 milliLiter(s) Oral at bedtime  trihexyphenidyl 2 milliGRAM(s) Oral three times a day      VITAL:  T(C): , Max: 36.6 (01-13-22 @ 20:59)  T(F): , Max: 97.9 (01-13-22 @ 20:59)  HR: 78 (01-14-22 @ 05:06)  BP: 112/70 (01-14-22 @ 05:06)  BP(mean): --  RR: 18 (01-14-22 @ 05:06)  SpO2: 97% (01-14-22 @ 05:06)  Wt(kg): --    I and O's:    01-13 @ 07:01  -  01-14 @ 07:00  --------------------------------------------------------  IN: 700 mL / OUT: 700 mL / NET: 0 mL          PHYSICAL EXAM:    Constitutional: NAD  Neck:  No JVD  Respiratory: diminished at bases   Cardiovascular: S1 and S2  Gastrointestinal: BS+, soft, NT/ND  Extremities: No peripheral edema  Neurological: decreased generalized strength   : No Javier  Skin: No rashes   Access: perm cath and avf     LABS:                        8.5    9.49  )-----------( 142      ( 13 Jan 2022 07:02 )             28.8     01-14    128<L>  |  93<L>  |  31<H>  ----------------------------<  182<H>  3.3<L>   |  25  |  3.43<H>    Ca    9.8      14 Jan 2022 06:48  Phos  3.1     01-14  Mg     2.1     01-14        RADIOLOGY & ADDITIONAL STUDIES:  < from: [facility] Duplex Lower Ext Vein Scan, Bilat (01.10.22 @ 14:37) >  INTERPRETATION:  CLINICAL INFORMATION: Covid positive with elevated   d-dimer.    COMPARISON: Prior Doppler dated 10/22/2021.    TECHNIQUE: Portable Duplex sonography of the BILATERAL LOWER extremity   veins with color and spectral Doppler, with and without compression.   Modified Covid protocol was performed.    FINDINGS:    RIGHT:  Normal compressibility of the RIGHT common femoral, femoral and popliteal   veins.  Doppler examination shows normal spontaneous and phasic flow.  Calf veins were not imaged.    LEFT:  Normal compressibility of the LEFT common femoral, femoral and popliteal   veins.  Doppler examination shows normalspontaneous and phasic flow.  Calf veins were not imaged.    IMPRESSION:  No evidence of deep venous thrombosis in the visualized segments of   either lower extremity. Calf veins were not imaged.      < end of copied text >

## 2022-01-14 NOTE — PROGRESS NOTE ADULT - SUBJECTIVE AND OBJECTIVE BOX
Chief Complaint:  Patient is a 71y old  Male who presents with a chief complaint of leg swelling (14 Jan 2022 09:52)      Date of service 01-14-22 @ 12:04      Interval Events:   Patient seen and examined. Appears comfortable. No abdominal tenderness.     Hospital Medications:  acetaminophen     Tablet .. 650 milliGRAM(s) Oral every 6 hours PRN  albuterol/ipratropium for Nebulization 3 milliLiter(s) Nebulizer every 6 hours PRN  atorvastatin 80 milliGRAM(s) Oral at bedtime  carbidopa/levodopa  25/100 2.5 Tablet(s) Oral <User Schedule>  carbidopa/levodopa  25/100 2 Tablet(s) Oral <User Schedule>  chlorhexidine 4% Liquid 1 Application(s) Topical <User Schedule>  cholecalciferol 1000 Unit(s) Oral daily  cinacalcet 60 milliGRAM(s) Oral daily  dextrose 40% Gel 15 Gram(s) Oral once  dextrose 5%. 1000 milliLiter(s) IV Continuous <Continuous>  dextrose 5%. 1000 milliLiter(s) IV Continuous <Continuous>  dextrose 50% Injectable 25 Gram(s) IV Push once  dextrose 50% Injectable 12.5 Gram(s) IV Push once  dextrose 50% Injectable 25 Gram(s) IV Push once  diVALproex Sprinkle 125 milliGRAM(s) Oral every 8 hours PRN  diVALproex Sprinkle 250 milliGRAM(s) Oral three times a day  DULoxetine 20 milliGRAM(s) Oral daily  folic acid 1 milliGRAM(s) Oral daily  glucagon  Injectable 1 milliGRAM(s) IntraMuscular once  guaiFENesin Oral Liquid (Sugar-Free) 200 milliGRAM(s) Oral every 6 hours PRN  heparin   Injectable 5000 Unit(s) SubCutaneous every 12 hours  insulin lispro (ADMELOG) corrective regimen sliding scale   SubCutaneous three times a day before meals  insulin lispro (ADMELOG) corrective regimen sliding scale   SubCutaneous at bedtime  levothyroxine Injectable 25 MICROGram(s) IV Push at bedtime  memantine 5 milliGRAM(s) Oral at bedtime  metoprolol tartrate 50 milliGRAM(s) Oral two times a day  midodrine 10 milliGRAM(s) Oral <User Schedule>  mirtazapine 7.5 milliGRAM(s) Oral daily  Nephro-celeste 1 Tablet(s) Oral daily  ondansetron Injectable 4 milliGRAM(s) IV Push once PRN  pantoprazole  Injectable 40 milliGRAM(s) IV Push two times a day  polyethylene glycol 3350 17 Gram(s) Oral two times a day  QUEtiapine 50 milliGRAM(s) Oral at bedtime  QUEtiapine 12.5 milliGRAM(s) Oral every 6 hours PRN  senna Syrup 10 milliLiter(s) Oral at bedtime  sodium chloride 0.9% lock flush 10 milliLiter(s) IV Push every 1 hour PRN  trihexyphenidyl 2 milliGRAM(s) Oral three times a day        Review of Systems:  unable to obtain    PHYSICAL EXAM:   Vital Signs:  Vital Signs Last 24 Hrs  T(C): 36.5 (14 Jan 2022 11:16), Max: 36.6 (13 Jan 2022 20:59)  T(F): 97.7 (14 Jan 2022 11:16), Max: 97.9 (13 Jan 2022 20:59)  HR: 91 (14 Jan 2022 11:16) (78 - 98)  BP: 118/68 (14 Jan 2022 11:16) (112/70 - 118/68)  BP(mean): --  RR: 18 (14 Jan 2022 11:16) (18 - 18)  SpO2: 98% (14 Jan 2022 11:16) (97% - 98%)  Daily     Daily       PHYSICAL EXAM:     GENERAL:  Appears stated age, well-groomed, well-nourished, no distress  HEENT:  NC/AT,  conjunctivae anicteric, clear and pink,   NECK: supple, trachea midline  CHEST:  Full & symmetric excursion, no increased effort, breath sounds clear  HEART:  Regular rhythm, no JVD  ABDOMEN:  Soft, non-tender, non-distended, normoactive bowel sounds,  no masses , no hepatosplenomegaly, +gastrostomy   EXTREMITIES:  no cyanosis,clubbing or edema  SKIN:  No rash, erythema, or, ecchymoses, no jaundice  NEURO:  non-focal, no asterixis  PSYCH: disoriented to place and time  RECTAL: Deferred      LABS Personally reviewed by me:                        8.5    9.49  )-----------( 142      ( 13 Jan 2022 07:02 )             28.8       01-14    128<L>  |  93<L>  |  31<H>  ----------------------------<  182<H>  3.3<L>   |  25  |  3.43<H>    Ca    9.8      14 Jan 2022 06:48  Phos  3.1     01-14  Mg     2.1     01-14                                    8.5    9.49  )-----------( 142      ( 13 Jan 2022 07:02 )             28.8                         8.7    9.11  )-----------( 134      ( 12 Jan 2022 08:36 )             29.6       Imaging personally reviewed by me:

## 2022-01-15 NOTE — PROGRESS NOTE ADULT - SUBJECTIVE AND OBJECTIVE BOX
Patient is a 71y old  Male who presents with a chief complaint of leg swelling (14 Jan 2022 17:58)      INTERVAL HPI/OVERNIGHT EVENTS: noted  pt seen and examined this am   events noted  +peg feeds  pt calm and cooperative      Vital Signs Last 24 Hrs  T(C): 36.6 (15 Paul 2022 10:55), Max: 36.9 (15 Paul 2022 05:15)  T(F): 97.9 (15 Paul 2022 10:55), Max: 98.4 (15 Paul 2022 05:15)  HR: 92 (15 Paul 2022 10:55) (70 - 92)  BP: 118/- (15 Paul 2022 11:35) (101/62 - 132/85)  BP(mean): --  RR: 18 (15 Paul 2022 10:55) (18 - 18)  SpO2: 95% (15 Paul 2022 10:55) (95% - 100%)    acetaminophen     Tablet .. 650 milliGRAM(s) Oral every 6 hours PRN  albuterol/ipratropium for Nebulization 3 milliLiter(s) Nebulizer every 6 hours PRN  atorvastatin 80 milliGRAM(s) Oral at bedtime  carbidopa/levodopa  25/100 2.5 Tablet(s) Oral <User Schedule>  carbidopa/levodopa  25/100 2 Tablet(s) Oral <User Schedule>  chlorhexidine 4% Liquid 1 Application(s) Topical <User Schedule>  cholecalciferol 1000 Unit(s) Oral daily  cinacalcet 60 milliGRAM(s) Oral daily  dextrose 40% Gel 15 Gram(s) Oral once  dextrose 5%. 1000 milliLiter(s) IV Continuous <Continuous>  dextrose 5%. 1000 milliLiter(s) IV Continuous <Continuous>  dextrose 50% Injectable 25 Gram(s) IV Push once  dextrose 50% Injectable 12.5 Gram(s) IV Push once  dextrose 50% Injectable 25 Gram(s) IV Push once  diVALproex Sprinkle 125 milliGRAM(s) Oral every 8 hours PRN  diVALproex Sprinkle 250 milliGRAM(s) Oral three times a day  DULoxetine 20 milliGRAM(s) Oral daily  epoetin mari-epbx (RETACRIT) Injectable 4000 Unit(s) IV Push once  folic acid 1 milliGRAM(s) Oral daily  glucagon  Injectable 1 milliGRAM(s) IntraMuscular once  guaiFENesin Oral Liquid (Sugar-Free) 200 milliGRAM(s) Oral every 6 hours PRN  heparin   Injectable 5000 Unit(s) SubCutaneous every 12 hours  insulin lispro (ADMELOG) corrective regimen sliding scale   SubCutaneous three times a day before meals  insulin lispro (ADMELOG) corrective regimen sliding scale   SubCutaneous at bedtime  levothyroxine Injectable 25 MICROGram(s) IV Push at bedtime  memantine 5 milliGRAM(s) Oral at bedtime  metoprolol tartrate 50 milliGRAM(s) Oral two times a day  midodrine 10 milliGRAM(s) Oral <User Schedule>  mirtazapine 7.5 milliGRAM(s) Oral daily  Nephro-celeste 1 Tablet(s) Oral daily  ondansetron Injectable 4 milliGRAM(s) IV Push once PRN  pantoprazole  Injectable 40 milliGRAM(s) IV Push two times a day  polyethylene glycol 3350 17 Gram(s) Oral two times a day  QUEtiapine 50 milliGRAM(s) Oral at bedtime  QUEtiapine 12.5 milliGRAM(s) Oral every 6 hours PRN  senna Syrup 10 milliLiter(s) Oral at bedtime  sodium chloride 0.9% lock flush 10 milliLiter(s) IV Push every 1 hour PRN  trihexyphenidyl 2 milliGRAM(s) Oral three times a day      PHYSICAL EXAM:  GENERAL: NAD,   EYES: conjunctiva and sclera clear  ENMT: Moist mucous membranes  NECK: Supple, No JVD, Normal thyroid  CHEST/LUNG: non labored, cta b/l  HEART: Regular rate and rhythm; No murmurs, rubs, or gallops  ABDOMEN: Soft, Nontender, Nondistended; Bowel sounds present  EXTREMITIES:  2+ Peripheral Pulses, No clubbing, cyanosis, or edema  LYMPH: No lymphadenopathy noted  SKIN: No rashes or lesions    Consultant(s) Notes Reviewed:  [x ] YES  [ ] NO  Care Discussed with Consultants/Other Providers [ x] YES  [ ] NO    LABS:                        8.0    8.28  )-----------( 119      ( 15 Paul 2022 06:09 )             26.6     01-15    127<L>  |  92<L>  |  46<H>  ----------------------------<  187<H>  3.9   |  23  |  4.25<H>    Ca    10.3      15 Paul 2022 06:09  Phos  3.1     01-14  Mg     2.1     01-14          CAPILLARY BLOOD GLUCOSE      POCT Blood Glucose.: 183 mg/dL (15 Paul 2022 11:36)  POCT Blood Glucose.: 170 mg/dL (15 Paul 2022 06:08)  POCT Blood Glucose.: 158 mg/dL (15 Paul 2022 00:39)  POCT Blood Glucose.: 98 mg/dL (14 Jan 2022 21:37)  POCT Blood Glucose.: 176 mg/dL (14 Jan 2022 17:48)              RADIOLOGY & ADDITIONAL TESTS:    Imaging Personally Reviewed:  [x ] YES  [ ] NO

## 2022-01-15 NOTE — PROGRESS NOTE ADULT - ASSESSMENT
71 M w volume overload, GI consulted for drop in hgb    1. CHF per cardio    2. ABBY on CKD per renal  -on HD  via permacath per renal, midodrine during dialysis  -s/p L AVF, awaiting maturation     3. Drop in hgb, no overt GI Bleed, no bleeding around PEG site  -anemia receiving Retacrit   -continue to trend CBCs    4. RP bleed  s/p Abdominal Angiography and Embolization on 10/29/2021 by Interventional radiology of l4and l5 lumbar artery  now h/h stable     5. Failure to thrive  -s/p PEG yesterday 1/10 via endoscopy   -maintain abdominal binder and mittens   -may initiate tube feeds     6. stress duodenal ulcers  -PPI BID  -stool for h-pylori testing    7. Constipation  -last BM unknown, abdomen soft non-distended  -nonobstructive gas pattern on xray 1/13  -continue Miralax BID      Attending supervision statement: I have personally seen and examined the patient. I fully participated in the care of this patient. I have made amendments to the documentation where necessary, and agree with the history, physical exam, and plan as outlined by the ACP.    [facility]  Gastroenterology and Hepatology  [address] [place], [place]  Office: [phone]  Cell: [phone]

## 2022-01-15 NOTE — PROGRESS NOTE ADULT - SUBJECTIVE AND OBJECTIVE BOX
Chief complaint    Patient is a 71y old  Male who presents with a chief complaint of leg swelling (15 Paul 2022 13:12)   Review of systems  Patient in bed, appears comfortable.    Labs and Fingersticks  CAPILLARY BLOOD GLUCOSE      POCT Blood Glucose.: 183 mg/dL (15 Paul 2022 11:36)  POCT Blood Glucose.: 170 mg/dL (15 Paul 2022 06:08)  POCT Blood Glucose.: 158 mg/dL (15 Paul 2022 00:39)  POCT Blood Glucose.: 98 mg/dL (14 Jan 2022 21:37)  POCT Blood Glucose.: 176 mg/dL (14 Jan 2022 17:48)      Anion Gap, Serum: 12 (01-15 @ 06:09)  Anion Gap, Serum: 10 (01-14 @ 06:48)      Calcium, Total Serum: 10.3 (01-15 @ 06:09)  Calcium, Total Serum: 9.8 (01-14 @ 06:48)          01-15    127<L>  |  92<L>  |  46<H>  ----------------------------<  187<H>  3.9   |  23  |  4.25<H>    Ca    10.3      15 Paul 2022 06:09  Phos  3.1     01-14  Mg     2.1     01-14                          8.0    8.28  )-----------( 119      ( 15 Paul 2022 06:09 )             26.6     Medications  MEDICATIONS  (STANDING):  atorvastatin 80 milliGRAM(s) Oral at bedtime  carbidopa/levodopa  25/100 2.5 Tablet(s) Oral <User Schedule>  carbidopa/levodopa  25/100 2 Tablet(s) Oral <User Schedule>  chlorhexidine 4% Liquid 1 Application(s) Topical <User Schedule>  cholecalciferol 1000 Unit(s) Oral daily  cinacalcet 60 milliGRAM(s) Oral daily  dextrose 40% Gel 15 Gram(s) Oral once  dextrose 5%. 1000 milliLiter(s) (50 mL/Hr) IV Continuous <Continuous>  dextrose 5%. 1000 milliLiter(s) (100 mL/Hr) IV Continuous <Continuous>  dextrose 50% Injectable 25 Gram(s) IV Push once  dextrose 50% Injectable 12.5 Gram(s) IV Push once  dextrose 50% Injectable 25 Gram(s) IV Push once  diVALproex Sprinkle 250 milliGRAM(s) Oral three times a day  DULoxetine 20 milliGRAM(s) Oral daily  epoetin mari-epbx (RETACRIT) Injectable 4000 Unit(s) IV Push once  folic acid 1 milliGRAM(s) Oral daily  glucagon  Injectable 1 milliGRAM(s) IntraMuscular once  heparin   Injectable 5000 Unit(s) SubCutaneous every 12 hours  insulin lispro (ADMELOG) corrective regimen sliding scale   SubCutaneous three times a day before meals  insulin lispro (ADMELOG) corrective regimen sliding scale   SubCutaneous at bedtime  levothyroxine Injectable 25 MICROGram(s) IV Push at bedtime  memantine 5 milliGRAM(s) Oral at bedtime  metoprolol tartrate 50 milliGRAM(s) Oral two times a day  midodrine 10 milliGRAM(s) Oral <User Schedule>  mirtazapine 7.5 milliGRAM(s) Oral daily  Nephro-celeste 1 Tablet(s) Oral daily  pantoprazole  Injectable 40 milliGRAM(s) IV Push two times a day  polyethylene glycol 3350 17 Gram(s) Oral two times a day  QUEtiapine 50 milliGRAM(s) Oral at bedtime  senna Syrup 10 milliLiter(s) Oral at bedtime  trihexyphenidyl 2 milliGRAM(s) Oral three times a day      Physical Exam  General: Patient comfortable in bed  Vital Signs Last 12 Hrs  T(F): 97.9 (01-15-22 @ 10:55), Max: 98.4 (01-15-22 @ 05:15)  HR: 78 (01-15-22 @ 13:56) (70 - 92)  BP: 139/68 (01-15-22 @ 13:56) (118/68 - 139/68)  BP(mean): --  RR: 18 (01-15-22 @ 10:55) (18 - 18)  SpO2: 95% (01-15-22 @ 10:55) (95% - 98%)  Neck: No palpable thyroid nodules.  CVS: S1S2, No murmurs  Respiratory: No wheezing, no crepitations  GI: Abdomen soft, bowel sounds positive  Musculoskeletal:  edema lower extremities.     Diagnostics    Cortisol AM, Serum: AM Sched. Collection: 15-Paul-2022 06:00 (01-14 @ 08:01)  Free Thyroxine, Serum: AM Sched. Collection: 15-Paul-2022 06:00 (01-14 @ 08:01)  Thyroid Stimulating Hormone, Serum: AM Sched. Collection: 15-Paul-2022 06:00 (01-14 @ 08:01)

## 2022-01-15 NOTE — PROGRESS NOTE ADULT - PROBLEM SELECTOR PLAN 3
Will continue synthroid 50 mcg po daily. Will continue monitoring and FU.  Suggest to FU outpatient in 4-6 weeks to repeat TFTs.  Discussed plan with patient. Will continue current  synthroid dose. Will continue monitoring and FU.  Suggest to FU outpatient in 4-6 weeks to repeat TFTs.  Discussed plan with patient.

## 2022-01-15 NOTE — PROGRESS NOTE ADULT - ASSESSMENT
Assessment  DMT2: 71y Male with DM T2 with hyperglycemia, A1C 6.4%, was on insulin at home, on insulin coverage, blood sugars within acceptable range, no hypoglycemic events.  Hypothyroidism: Patient has no history thyroid disease, was not on any thyroid supplements, subclinical with low-normal FT4, lethargic, started on synthroid 25 mcg po daily, repeat FT4 trending down, on synthroid  50 mcg po daily, FT4 improving.  Hypercalcemia: Started on Sensipar 60mg daily, Ca fluctuating/remains elevated..  CHF: on medications, stable, monitored.  HTN: Controlled,  on antihypertensive medications.  Parkinsons: on meds, monitored.  CKD: Monitor labs/BMP      Dr Ayers  Cell : [phone]   Assessment  DMT2: 71y Male with DM T2 with hyperglycemia, A1C 6.4%, was on insulin at home, on insulin coverage, blood sugars within acceptable range, no hypoglycemic events.  Hypothyroidism: Patient has no history thyroid disease, was not on any thyroid supplements, subclinical with low-normal FT4, lethargic, started on synthroid 25 mcg po daily, repeat FT4 trending down, NPO switched to IV synhroid 25 mcg po daily  Hypercalcemia: Started on Sensipar 60mg daily, Ca fluctuating/remains elevated..  CHF: on medications, stable, monitored.  HTN: Controlled,  on antihypertensive medications.  Parkinsons: on meds, monitored.  CKD: Monitor labs/BMP      Dr Ayers  Cell : [phone]

## 2022-01-15 NOTE — PROGRESS NOTE ADULT - SUBJECTIVE AND OBJECTIVE BOX
Chief Complaint:  Patient is a 71y old  Male who presents with a chief complaint of leg swelling (14 Jan 2022 09:52)      Date of service 01-15-22 @ 12:04      Interval Events:   Patient seen and examined. Appears comfortable. No abdominal tenderness.     Hospital Medications:  acetaminophen     Tablet .. 650 milliGRAM(s) Oral every 6 hours PRN  albuterol/ipratropium for Nebulization 3 milliLiter(s) Nebulizer every 6 hours PRN  atorvastatin 80 milliGRAM(s) Oral at bedtime  carbidopa/levodopa  25/100 2.5 Tablet(s) Oral <User Schedule>  carbidopa/levodopa  25/100 2 Tablet(s) Oral <User Schedule>  chlorhexidine 4% Liquid 1 Application(s) Topical <User Schedule>  cholecalciferol 1000 Unit(s) Oral daily  cinacalcet 60 milliGRAM(s) Oral daily  dextrose 40% Gel 15 Gram(s) Oral once  dextrose 5%. 1000 milliLiter(s) IV Continuous <Continuous>  dextrose 5%. 1000 milliLiter(s) IV Continuous <Continuous>  dextrose 50% Injectable 25 Gram(s) IV Push once  dextrose 50% Injectable 12.5 Gram(s) IV Push once  dextrose 50% Injectable 25 Gram(s) IV Push once  diVALproex Sprinkle 125 milliGRAM(s) Oral every 8 hours PRN  diVALproex Sprinkle 250 milliGRAM(s) Oral three times a day  DULoxetine 20 milliGRAM(s) Oral daily  folic acid 1 milliGRAM(s) Oral daily  glucagon  Injectable 1 milliGRAM(s) IntraMuscular once  guaiFENesin Oral Liquid (Sugar-Free) 200 milliGRAM(s) Oral every 6 hours PRN  heparin   Injectable 5000 Unit(s) SubCutaneous every 12 hours  insulin lispro (ADMELOG) corrective regimen sliding scale   SubCutaneous three times a day before meals  insulin lispro (ADMELOG) corrective regimen sliding scale   SubCutaneous at bedtime  levothyroxine Injectable 25 MICROGram(s) IV Push at bedtime  memantine 5 milliGRAM(s) Oral at bedtime  metoprolol tartrate 50 milliGRAM(s) Oral two times a day  midodrine 10 milliGRAM(s) Oral <User Schedule>  mirtazapine 7.5 milliGRAM(s) Oral daily  Nephro-celeste 1 Tablet(s) Oral daily  ondansetron Injectable 4 milliGRAM(s) IV Push once PRN  pantoprazole  Injectable 40 milliGRAM(s) IV Push two times a day  polyethylene glycol 3350 17 Gram(s) Oral two times a day  QUEtiapine 50 milliGRAM(s) Oral at bedtime  QUEtiapine 12.5 milliGRAM(s) Oral every 6 hours PRN  senna Syrup 10 milliLiter(s) Oral at bedtime  sodium chloride 0.9% lock flush 10 milliLiter(s) IV Push every 1 hour PRN  trihexyphenidyl 2 milliGRAM(s) Oral three times a day        Review of Systems:  unable to obtain    PHYSICAL EXAM:   Vital Signs:  Vital Signs Last 24 Hrs  T(C): 36.5 (14 Jan 2022 11:16), Max: 36.6 (13 Jan 2022 20:59)  T(F): 97.7 (14 Jan 2022 11:16), Max: 97.9 (13 Jan 2022 20:59)  HR: 91 (14 Jan 2022 11:16) (78 - 98)  BP: 118/68 (14 Jan 2022 11:16) (112/70 - 118/68)  BP(mean): --  RR: 18 (14 Jan 2022 11:16) (18 - 18)  SpO2: 98% (14 Jan 2022 11:16) (97% - 98%)  Daily     Daily       PHYSICAL EXAM:     GENERAL:  Appears stated age, well-groomed, well-nourished, no distress  HEENT:  NC/AT,  conjunctivae anicteric, clear and pink,   NECK: supple, trachea midline  CHEST:  Full & symmetric excursion, no increased effort, breath sounds clear  HEART:  Regular rhythm, no JVD  ABDOMEN:  Soft, non-tender, non-distended, normoactive bowel sounds,  no masses , no hepatosplenomegaly, +gastrostomy   EXTREMITIES:  no cyanosis,clubbing or edema  SKIN:  No rash, erythema, or, ecchymoses, no jaundice  NEURO:  non-focal, no asterixis  PSYCH: disoriented to place and time  RECTAL: Deferred      LABS Personally reviewed by me:                        8.5    9.49  )-----------( 142      ( 13 Jan 2022 07:02 )             28.8       01-14    128<L>  |  93<L>  |  31<H>  ----------------------------<  182<H>  3.3<L>   |  25  |  3.43<H>    Ca    9.8      14 Jan 2022 06:48  Phos  3.1     01-14  Mg     2.1     01-14                                    8.5    9.49  )-----------( 142      ( 13 Jan 2022 07:02 )             28.8                         8.7    9.11  )-----------( 134      ( 12 Jan 2022 08:36 )             29.6       Imaging personally reviewed by me:

## 2022-01-15 NOTE — PROGRESS NOTE ADULT - ASSESSMENT
72yo M w/ PMHx of CAD (s/p CABG 2019), CKD (unknown stage), DM2, Parkinson's Disease, HTN, depression presents with new onset acute heart failure exacerbation, NSTEMI, started on hep gtt, developed bl RP bleed, acute anemia. sp MICU course for hemorrhagic shock, 10/28 sp IR embolization l4 and l5 lumbar artery. sp permacath and AVF.    # Acute on chronic systolic and diastolic heart failure  # NSTEMI, medical management  # ESRD, new HD  # Atrial flutter  # acute hypoxic resp failure  # hemorrhagic shock, left RP hematoma, acute blood loss anemia sp embolization l4 and l5 lumbar artery 10/28  # lupus anticoagulant +  HD via permacath per renal, midodrine during dialysis  sp L AVF   12/13 pt pulled out permacath, replaced with new permacath 12/14  per renal, AVF needs more time to mature  hyponatremia- fu renal    # Parkinson's disease   # delirium  cont on depakote and cymbalta  c/w trihexyphenidyl, carbidopa/levodopa   cont seroquel 50mg qhs     # DM2 (diabetes mellitus, type 2)  per endo  hba1c 6.4    # CAD (coronary artery disease)  # HTN  lopressor, atorvastatin  asa on hold    # FTT  sp PEG via endoscopy  nutrition for tube feeds  mittens and abd binder    # surveillance COVID positive 12/28  asymptomatic off isolation  sating well on RA  doppler LE ro dvt    DVT ppx hsq    DNR/DNI    dispo: fu doppler -LE neg dvt,   started tube feeds  dc planning    [facility]  [phone]

## 2022-01-16 NOTE — PROGRESS NOTE ADULT - ASSESSMENT
Assessment  DMT2: 71y Male with DM T2 with hyperglycemia, A1C 6.4%, was on insulin at home, on insulin coverage, blood sugars improving, no hypoglycemic events no overnight events.  Hypothyroidism: Patient has no history thyroid disease, was not on any thyroid supplements, subclinical with low-normal FT4, lethargic, started on synthroid 25 mcg po daily, repeat FT4 trending down, NPO switched to IV synhroid 25 mcg po daily  Hypercalcemia: Started on Sensipar 60mg daily, Ca fluctuating/remains elevated..  CHF: on medications, stable, monitored.  HTN: Controlled,  on antihypertensive medications.  Parkinsons: on meds, monitored.  CKD: Monitor labs/BMP      Dr Ayers  Cell : [phone]

## 2022-01-16 NOTE — PROGRESS NOTE ADULT - SUBJECTIVE AND OBJECTIVE BOX
Chief complaint    Patient is a 71y old  Male who presents with a chief complaint of leg swelling (16 Jan 2022 13:37)   Review of systems  Patient in bed, appears comfortable.    Labs and Fingersticks  CAPILLARY BLOOD GLUCOSE      POCT Blood Glucose.: 170 mg/dL (16 Jan 2022 16:11)  POCT Blood Glucose.: 231 mg/dL (16 Jan 2022 11:38)  POCT Blood Glucose.: 194 mg/dL (16 Jan 2022 07:39)  POCT Blood Glucose.: 205 mg/dL (15 Paul 2022 21:01)      Anion Gap, Serum: 12 (01-15 @ 06:09)      Calcium, Total Serum: 10.3 (01-15 @ 06:09)          01-15    127<L>  |  92<L>  |  46<H>  ----------------------------<  187<H>  3.9   |  23  |  4.25<H>    Ca    10.3      15 Paul 2022 06:09                          8.0    8.28  )-----------( 119      ( 15 Paul 2022 06:09 )             26.6     Medications  MEDICATIONS  (STANDING):  atorvastatin 80 milliGRAM(s) Oral at bedtime  carbidopa/levodopa  25/100 2.5 Tablet(s) Oral <User Schedule>  carbidopa/levodopa  25/100 2 Tablet(s) Oral <User Schedule>  chlorhexidine 4% Liquid 1 Application(s) Topical <User Schedule>  cholecalciferol 1000 Unit(s) Oral daily  cinacalcet 60 milliGRAM(s) Oral daily  dextrose 40% Gel 15 Gram(s) Oral once  dextrose 5%. 1000 milliLiter(s) (50 mL/Hr) IV Continuous <Continuous>  dextrose 5%. 1000 milliLiter(s) (100 mL/Hr) IV Continuous <Continuous>  dextrose 50% Injectable 25 Gram(s) IV Push once  dextrose 50% Injectable 12.5 Gram(s) IV Push once  dextrose 50% Injectable 25 Gram(s) IV Push once  diVALproex Sprinkle 250 milliGRAM(s) Oral three times a day  DULoxetine 20 milliGRAM(s) Oral daily  folic acid 1 milliGRAM(s) Oral daily  glucagon  Injectable 1 milliGRAM(s) IntraMuscular once  heparin   Injectable 5000 Unit(s) SubCutaneous every 12 hours  insulin lispro (ADMELOG) corrective regimen sliding scale   SubCutaneous three times a day before meals  insulin lispro (ADMELOG) corrective regimen sliding scale   SubCutaneous at bedtime  levothyroxine 25 MICROGram(s) Oral daily  memantine 5 milliGRAM(s) Oral at bedtime  metoprolol tartrate 50 milliGRAM(s) Oral two times a day  midodrine 10 milliGRAM(s) Oral <User Schedule>  mirtazapine 7.5 milliGRAM(s) Oral daily  Nephro-celeste 1 Tablet(s) Oral daily  pantoprazole   Suspension 40 milliGRAM(s) Enteral Tube two times a day  polyethylene glycol 3350 17 Gram(s) Oral two times a day  QUEtiapine 50 milliGRAM(s) Oral at bedtime  senna Syrup 10 milliLiter(s) Oral at bedtime  trihexyphenidyl 2 milliGRAM(s) Oral three times a day      Physical Exam  General: Patient comfortable in bed  Vital Signs Last 12 Hrs  T(F): 98.1 (01-16-22 @ 10:37), Max: 98.1 (01-16-22 @ 10:37)  HR: 96 (01-16-22 @ 10:37) (96 - 96)  BP: 108/67 (01-16-22 @ 10:37) (108/67 - 108/67)  BP(mean): --  RR: 18 (01-16-22 @ 10:37) (18 - 18)  SpO2: 98% (01-16-22 @ 10:37) (98% - 98%)  Neck: No palpable thyroid nodules.  CVS: S1S2, No murmurs  Respiratory: No wheezing, no crepitations  GI: Abdomen soft, bowel sounds positive  Musculoskeletal:  edema lower extremities.     Diagnostics    Cortisol AM, Serum: AM Sched. Collection: 15-Paul-2022 06:00 (01-14 @ 08:01)  Free Thyroxine, Serum: AM Sched. Collection: 15-Paul-2022 06:00 (01-14 @ 08:01)  Thyroid Stimulating Hormone, Serum: AM Sched. Collection: 15-Paul-2022 06:00 (01-14 @ 08:01)

## 2022-01-16 NOTE — PROGRESS NOTE ADULT - SUBJECTIVE AND OBJECTIVE BOX
Chief Complaint:  Patient is a 71y old  Male who presents with a chief complaint of leg swelling (14 Jan 2022 09:52)      Date of service 01-16-22 @ 12:04      Interval Events:   Patient seen and examined. Appears comfortable. No abdominal tenderness.     Hospital Medications:  acetaminophen     Tablet .. 650 milliGRAM(s) Oral every 6 hours PRN  albuterol/ipratropium for Nebulization 3 milliLiter(s) Nebulizer every 6 hours PRN  atorvastatin 80 milliGRAM(s) Oral at bedtime  carbidopa/levodopa  25/100 2.5 Tablet(s) Oral <User Schedule>  carbidopa/levodopa  25/100 2 Tablet(s) Oral <User Schedule>  chlorhexidine 4% Liquid 1 Application(s) Topical <User Schedule>  cholecalciferol 1000 Unit(s) Oral daily  cinacalcet 60 milliGRAM(s) Oral daily  dextrose 40% Gel 15 Gram(s) Oral once  dextrose 5%. 1000 milliLiter(s) IV Continuous <Continuous>  dextrose 5%. 1000 milliLiter(s) IV Continuous <Continuous>  dextrose 50% Injectable 25 Gram(s) IV Push once  dextrose 50% Injectable 12.5 Gram(s) IV Push once  dextrose 50% Injectable 25 Gram(s) IV Push once  diVALproex Sprinkle 125 milliGRAM(s) Oral every 8 hours PRN  diVALproex Sprinkle 250 milliGRAM(s) Oral three times a day  DULoxetine 20 milliGRAM(s) Oral daily  folic acid 1 milliGRAM(s) Oral daily  glucagon  Injectable 1 milliGRAM(s) IntraMuscular once  guaiFENesin Oral Liquid (Sugar-Free) 200 milliGRAM(s) Oral every 6 hours PRN  heparin   Injectable 5000 Unit(s) SubCutaneous every 12 hours  insulin lispro (ADMELOG) corrective regimen sliding scale   SubCutaneous three times a day before meals  insulin lispro (ADMELOG) corrective regimen sliding scale   SubCutaneous at bedtime  levothyroxine Injectable 25 MICROGram(s) IV Push at bedtime  memantine 5 milliGRAM(s) Oral at bedtime  metoprolol tartrate 50 milliGRAM(s) Oral two times a day  midodrine 10 milliGRAM(s) Oral <User Schedule>  mirtazapine 7.5 milliGRAM(s) Oral daily  Nephro-celeste 1 Tablet(s) Oral daily  ondansetron Injectable 4 milliGRAM(s) IV Push once PRN  pantoprazole  Injectable 40 milliGRAM(s) IV Push two times a day  polyethylene glycol 3350 17 Gram(s) Oral two times a day  QUEtiapine 50 milliGRAM(s) Oral at bedtime  QUEtiapine 12.5 milliGRAM(s) Oral every 6 hours PRN  senna Syrup 10 milliLiter(s) Oral at bedtime  sodium chloride 0.9% lock flush 10 milliLiter(s) IV Push every 1 hour PRN  trihexyphenidyl 2 milliGRAM(s) Oral three times a day        Review of Systems:  unable to obtain    PHYSICAL EXAM:   Vital Signs:  Vital Signs Last 24 Hrs  T(C): 36.5 (14 Jan 2022 11:16), Max: 36.6 (13 Jan 2022 20:59)  T(F): 97.7 (14 Jan 2022 11:16), Max: 97.9 (13 Jan 2022 20:59)  HR: 91 (14 Jan 2022 11:16) (78 - 98)  BP: 118/68 (14 Jan 2022 11:16) (112/70 - 118/68)  BP(mean): --  RR: 18 (14 Jan 2022 11:16) (18 - 18)  SpO2: 98% (14 Jan 2022 11:16) (97% - 98%)  Daily     Daily       PHYSICAL EXAM:     GENERAL:  Appears stated age, well-groomed, well-nourished, no distress  HEENT:  NC/AT,  conjunctivae anicteric, clear and pink,   NECK: supple, trachea midline  CHEST:  Full & symmetric excursion, no increased effort, breath sounds clear  HEART:  Regular rhythm, no JVD  ABDOMEN:  Soft, non-tender, non-distended, normoactive bowel sounds,  no masses , no hepatosplenomegaly, +gastrostomy   EXTREMITIES:  no cyanosis,clubbing or edema  SKIN:  No rash, erythema, or, ecchymoses, no jaundice  NEURO:  non-focal, no asterixis  PSYCH: disoriented to place and time  RECTAL: Deferred      LABS Personally reviewed by me:                        8.5    9.49  )-----------( 142      ( 13 Jan 2022 07:02 )             28.8       01-14    128<L>  |  93<L>  |  31<H>  ----------------------------<  182<H>  3.3<L>   |  25  |  3.43<H>    Ca    9.8      14 Jan 2022 06:48  Phos  3.1     01-14  Mg     2.1     01-14                                    8.5    9.49  )-----------( 142      ( 13 Jan 2022 07:02 )             28.8                         8.7    9.11  )-----------( 134      ( 12 Jan 2022 08:36 )             29.6       Imaging personally reviewed by me:

## 2022-01-16 NOTE — PROGRESS NOTE ADULT - PROBLEM SELECTOR PLAN 3
Will continue current  synthroid dose. Will continue monitoring and FU.  Suggest to FU outpatient in 4-6 weeks to repeat TFTs.  Discussed plan with patient.

## 2022-01-16 NOTE — PROGRESS NOTE ADULT - SUBJECTIVE AND OBJECTIVE BOX
Patient is a 71y old  Male who presents with a chief complaint of leg swelling (16 Jan 2022 09:57)      INTERVAL HPI/OVERNIGHT EVENTS: noted  pt seen and examined this am   events noted  no new events/complaints      Vital Signs Last 24 Hrs  T(C): 36.7 (16 Jan 2022 10:37), Max: 37.6 (16 Jan 2022 04:58)  T(F): 98.1 (16 Jan 2022 10:37), Max: 99.6 (16 Jan 2022 04:58)  HR: 96 (16 Jan 2022 10:37) (63 - 98)  BP: 108/67 (16 Jan 2022 10:37) (107/67 - 139/68)  BP(mean): --  RR: 18 (16 Jan 2022 10:37) (17 - 18)  SpO2: 98% (16 Jan 2022 10:37) (98% - 100%)    acetaminophen     Tablet .. 650 milliGRAM(s) Oral every 6 hours PRN  albuterol/ipratropium for Nebulization 3 milliLiter(s) Nebulizer every 6 hours PRN  atorvastatin 80 milliGRAM(s) Oral at bedtime  carbidopa/levodopa  25/100 2.5 Tablet(s) Oral <User Schedule>  carbidopa/levodopa  25/100 2 Tablet(s) Oral <User Schedule>  chlorhexidine 4% Liquid 1 Application(s) Topical <User Schedule>  cholecalciferol 1000 Unit(s) Oral daily  cinacalcet 60 milliGRAM(s) Oral daily  dextrose 40% Gel 15 Gram(s) Oral once  dextrose 5%. 1000 milliLiter(s) IV Continuous <Continuous>  dextrose 5%. 1000 milliLiter(s) IV Continuous <Continuous>  dextrose 50% Injectable 25 Gram(s) IV Push once  dextrose 50% Injectable 12.5 Gram(s) IV Push once  dextrose 50% Injectable 25 Gram(s) IV Push once  diVALproex Sprinkle 125 milliGRAM(s) Oral every 8 hours PRN  diVALproex Sprinkle 250 milliGRAM(s) Oral three times a day  DULoxetine 20 milliGRAM(s) Oral daily  folic acid 1 milliGRAM(s) Oral daily  glucagon  Injectable 1 milliGRAM(s) IntraMuscular once  guaiFENesin Oral Liquid (Sugar-Free) 200 milliGRAM(s) Oral every 6 hours PRN  heparin   Injectable 5000 Unit(s) SubCutaneous every 12 hours  insulin lispro (ADMELOG) corrective regimen sliding scale   SubCutaneous at bedtime  insulin lispro (ADMELOG) corrective regimen sliding scale   SubCutaneous three times a day before meals  levothyroxine Injectable 25 MICROGram(s) IV Push at bedtime  memantine 5 milliGRAM(s) Oral at bedtime  metoprolol tartrate 50 milliGRAM(s) Oral two times a day  midodrine 10 milliGRAM(s) Oral <User Schedule>  mirtazapine 7.5 milliGRAM(s) Oral daily  Nephro-celeste 1 Tablet(s) Oral daily  ondansetron Injectable 4 milliGRAM(s) IV Push once PRN  pantoprazole  Injectable 40 milliGRAM(s) IV Push two times a day  polyethylene glycol 3350 17 Gram(s) Oral two times a day  QUEtiapine 50 milliGRAM(s) Oral at bedtime  QUEtiapine 12.5 milliGRAM(s) Oral every 6 hours PRN  senna Syrup 10 milliLiter(s) Oral at bedtime  sodium chloride 0.9% lock flush 10 milliLiter(s) IV Push every 1 hour PRN  trihexyphenidyl 2 milliGRAM(s) Oral three times a day      PHYSICAL EXAM:  GENERAL: NAD,   EYES: conjunctiva and sclera clear  ENMT: Moist mucous membranes  NECK: Supple, No JVD, Normal thyroid  CHEST/LUNG: non labored, cta b/l  HEART: Regular rate and rhythm; No murmurs, rubs, or gallops  ABDOMEN: Soft, Nontender, Nondistended; Bowel sounds present  EXTREMITIES:  2+ Peripheral Pulses, No clubbing, cyanosis, or edema  LYMPH: No lymphadenopathy noted  SKIN: No rashes or lesions    Consultant(s) Notes Reviewed:  [x ] YES  [ ] NO  Care Discussed with Consultants/Other Providers [ x] YES  [ ] NO    LABS:                        8.0    8.28  )-----------( 119      ( 15 Paul 2022 06:09 )             26.6     01-15    127<L>  |  92<L>  |  46<H>  ----------------------------<  187<H>  3.9   |  23  |  4.25<H>    Ca    10.3      15 Paul 2022 06:09          CAPILLARY BLOOD GLUCOSE      POCT Blood Glucose.: 231 mg/dL (16 Jan 2022 11:38)  POCT Blood Glucose.: 194 mg/dL (16 Jan 2022 07:39)  POCT Blood Glucose.: 205 mg/dL (15 Paul 2022 21:01)  POCT Blood Glucose.: 119 mg/dL (15 Paul 2022 18:24)              RADIOLOGY & ADDITIONAL TESTS:    Imaging Personally Reviewed:  [x ] YES  [ ] NO

## 2022-01-16 NOTE — PROGRESS NOTE ADULT - ASSESSMENT
71 M w volume overload, GI consulted for drop in hgb    1. CHF per cardio    2. ABBY on CKD per renal  -on HD  via permacath per renal, midodrine during dialysis  -s/p L AVF, awaiting maturation     3. Drop in hgb, no overt GI Bleed, no bleeding around PEG site  -anemia receiving Retacrit   -continue to trend CBCs    4. RP bleed  s/p Abdominal Angiography and Embolization on 10/29/2021 by Interventional radiology of l4and l5 lumbar artery  now h/h stable     5. Failure to thrive  -s/p PEG 1/10    -maintain abdominal binder and mittens   -may initiate tube feeds     6. stress duodenal ulcers  -PPI BID    7. Constipation  -last BM unknown, abdomen soft non-distended  -nonobstructive gas pattern on xray 1/13  -continue Miralax BID      Attending supervision statement: I have personally seen and examined the patient. I fully participated in the care of this patient. I have made amendments to the documentation where necessary, and agree with the history, physical exam, and plan as outlined by the ACP.    [facility]  Gastroenterology and Hepatology  [address] [place], [place]  Office: [phone]  Cell: [phone]

## 2022-01-16 NOTE — PROGRESS NOTE ADULT - ASSESSMENT
70yo M w/ PMHx of CAD (s/p CABG 2019), CKD (unknown stage), DM2, Parkinson's Disease, HTN, depression presents with new onset acute heart failure exacerbation, NSTEMI, started on hep gtt, developed bl RP bleed, acute anemia. sp MICU course for hemorrhagic shock, 10/28 sp IR embolization l4 and l5 lumbar artery. sp permacath and AVF.    # Acute on chronic systolic and diastolic heart failure  # NSTEMI, medical management  # ESRD, new HD  # Atrial flutter  # acute hypoxic resp failure  # hemorrhagic shock, left RP hematoma, acute blood loss anemia sp embolization l4 and l5 lumbar artery 10/28  # lupus anticoagulant +  HD via permacath per renal, midodrine during dialysis  sp L AVF   12/13 pt pulled out permacath, replaced with new permacath 12/14  per renal, AVF needs more time to mature  hyponatremia- fu renal    # Parkinson's disease   # delirium  cont on depakote and cymbalta  c/w trihexyphenidyl, carbidopa/levodopa   cont seroquel 50mg qhs     # DM2 (diabetes mellitus, type 2)  per endo  hba1c 6.4    # CAD (coronary artery disease)  # HTN  lopressor, atorvastatin  asa on hold    # FTT  sp PEG via endoscopy  nutrition for tube feeds  mittens and abd binder    # surveillance COVID positive 12/28  asymptomatic off isolation  sating well on RA  doppler LE ro dvt    DVT ppx hsq    DNR/DNI    dispo: fu doppler -LE neg dvt,   started tube feeds  dc planning    [facility]  [phone]

## 2022-01-17 NOTE — PROGRESS NOTE ADULT - SUBJECTIVE AND OBJECTIVE BOX
Patient is a 71y old  Male who presents with a chief complaint of leg swelling (17 Jan 2022 14:29)      INTERVAL HPI/OVERNIGHT EVENTS: noted  pt seen and examined this am   events noted  no new events/complaints      Vital Signs Last 24 Hrs  T(C): 36.6 (17 Jan 2022 11:11), Max: 36.6 (17 Jan 2022 11:11)  T(F): 97.8 (17 Jan 2022 11:11), Max: 97.8 (17 Jan 2022 11:11)  HR: 95 (17 Jan 2022 11:11) (86 - 95)  BP: 129/67 (17 Jan 2022 11:11) (122/63 - 129/67)  BP(mean): --  RR: 18 (17 Jan 2022 11:11) (18 - 18)  SpO2: 98% (17 Jan 2022 11:11) (97% - 98%)    acetaminophen     Tablet .. 650 milliGRAM(s) Oral every 6 hours PRN  albuterol/ipratropium for Nebulization 3 milliLiter(s) Nebulizer every 6 hours PRN  atorvastatin 80 milliGRAM(s) Oral at bedtime  carbidopa/levodopa  25/100 2.5 Tablet(s) Oral <User Schedule>  carbidopa/levodopa  25/100 2 Tablet(s) Oral <User Schedule>  chlorhexidine 4% Liquid 1 Application(s) Topical <User Schedule>  cholecalciferol 1000 Unit(s) Oral daily  cinacalcet 60 milliGRAM(s) Oral daily  dextrose 40% Gel 15 Gram(s) Oral once  dextrose 5%. 1000 milliLiter(s) IV Continuous <Continuous>  dextrose 5%. 1000 milliLiter(s) IV Continuous <Continuous>  dextrose 50% Injectable 25 Gram(s) IV Push once  dextrose 50% Injectable 12.5 Gram(s) IV Push once  dextrose 50% Injectable 25 Gram(s) IV Push once  diVALproex Sprinkle 125 milliGRAM(s) Oral every 8 hours PRN  diVALproex Sprinkle 250 milliGRAM(s) Oral three times a day  DULoxetine 20 milliGRAM(s) Oral daily  folic acid 1 milliGRAM(s) Oral daily  glucagon  Injectable 1 milliGRAM(s) IntraMuscular once  guaiFENesin Oral Liquid (Sugar-Free) 200 milliGRAM(s) Oral every 6 hours PRN  heparin   Injectable 5000 Unit(s) SubCutaneous every 12 hours  insulin lispro (ADMELOG) corrective regimen sliding scale   SubCutaneous three times a day before meals  insulin lispro (ADMELOG) corrective regimen sliding scale   SubCutaneous at bedtime  levothyroxine 25 MICROGram(s) Oral daily  memantine 5 milliGRAM(s) Oral at bedtime  metoprolol tartrate 50 milliGRAM(s) Oral two times a day  midodrine 10 milliGRAM(s) Oral <User Schedule>  mirtazapine 7.5 milliGRAM(s) Oral daily  Nephro-celeste 1 Tablet(s) Oral daily  ondansetron Injectable 4 milliGRAM(s) IV Push once PRN  pantoprazole   Suspension 40 milliGRAM(s) Enteral Tube two times a day  polyethylene glycol 3350 17 Gram(s) Oral two times a day  QUEtiapine 50 milliGRAM(s) Oral at bedtime  QUEtiapine 12.5 milliGRAM(s) Oral every 6 hours PRN  senna Syrup 10 milliLiter(s) Oral at bedtime  sodium chloride 0.9% lock flush 10 milliLiter(s) IV Push every 1 hour PRN  trihexyphenidyl 2 milliGRAM(s) Oral three times a day      PHYSICAL EXAM:  GENERAL: NAD,   EYES: conjunctiva and sclera clear  ENMT: Moist mucous membranes  NECK: Supple, No JVD, Normal thyroid  CHEST/LUNG: non labored, cta b/l  HEART: Regular rate and rhythm; No murmurs, rubs, or gallops  ABDOMEN: Soft, Nontender, Nondistended; Bowel sounds present  EXTREMITIES:  2+ Peripheral Pulses, No clubbing, cyanosis, or edema  LYMPH: No lymphadenopathy noted  SKIN: No rashes or lesions    Consultant(s) Notes Reviewed:  [x ] YES  [ ] NO  Care Discussed with Consultants/Other Providers [ x] YES  [ ] NO    LABS:              CAPILLARY BLOOD GLUCOSE      POCT Blood Glucose.: 214 mg/dL (17 Jan 2022 11:38)  POCT Blood Glucose.: 161 mg/dL (17 Jan 2022 07:40)  POCT Blood Glucose.: 169 mg/dL (16 Jan 2022 20:33)  POCT Blood Glucose.: 170 mg/dL (16 Jan 2022 16:11)              RADIOLOGY & ADDITIONAL TESTS:    Imaging Personally Reviewed:  [x ] YES  [ ] NO

## 2022-01-17 NOTE — PROGRESS NOTE ADULT - SUBJECTIVE AND OBJECTIVE BOX
Chief complaint    Patient is a 71y old  Male who presents with a chief complaint of leg swelling (17 Jan 2022 15:17)   Review of systems  Patient in bed, appears comfortable.    Labs and Fingersticks  CAPILLARY BLOOD GLUCOSE      POCT Blood Glucose.: 214 mg/dL (17 Jan 2022 11:38)  POCT Blood Glucose.: 161 mg/dL (17 Jan 2022 07:40)  POCT Blood Glucose.: 169 mg/dL (16 Jan 2022 20:33)  POCT Blood Glucose.: 170 mg/dL (16 Jan 2022 16:11)          Medications  MEDICATIONS  (STANDING):  atorvastatin 80 milliGRAM(s) Oral at bedtime  carbidopa/levodopa  25/100 2.5 Tablet(s) Oral <User Schedule>  carbidopa/levodopa  25/100 2 Tablet(s) Oral <User Schedule>  chlorhexidine 4% Liquid 1 Application(s) Topical <User Schedule>  cholecalciferol 1000 Unit(s) Oral daily  cinacalcet 60 milliGRAM(s) Oral daily  dextrose 40% Gel 15 Gram(s) Oral once  dextrose 5%. 1000 milliLiter(s) (50 mL/Hr) IV Continuous <Continuous>  dextrose 5%. 1000 milliLiter(s) (100 mL/Hr) IV Continuous <Continuous>  dextrose 50% Injectable 25 Gram(s) IV Push once  dextrose 50% Injectable 12.5 Gram(s) IV Push once  dextrose 50% Injectable 25 Gram(s) IV Push once  diVALproex Sprinkle 250 milliGRAM(s) Oral three times a day  DULoxetine 20 milliGRAM(s) Oral daily  folic acid 1 milliGRAM(s) Oral daily  glucagon  Injectable 1 milliGRAM(s) IntraMuscular once  heparin   Injectable 5000 Unit(s) SubCutaneous every 12 hours  insulin lispro (ADMELOG) corrective regimen sliding scale   SubCutaneous three times a day before meals  insulin lispro (ADMELOG) corrective regimen sliding scale   SubCutaneous at bedtime  levothyroxine 25 MICROGram(s) Oral daily  memantine 5 milliGRAM(s) Oral at bedtime  metoprolol tartrate 50 milliGRAM(s) Oral two times a day  midodrine 10 milliGRAM(s) Oral <User Schedule>  mirtazapine 7.5 milliGRAM(s) Oral daily  Nephro-celeste 1 Tablet(s) Oral daily  pantoprazole   Suspension 40 milliGRAM(s) Enteral Tube two times a day  polyethylene glycol 3350 17 Gram(s) Oral two times a day  QUEtiapine 50 milliGRAM(s) Oral at bedtime  senna Syrup 10 milliLiter(s) Oral at bedtime  trihexyphenidyl 2 milliGRAM(s) Oral three times a day      Physical Exam  General: Patient comfortable in bed  Vital Signs Last 12 Hrs  T(F): 97.8 (01-17-22 @ 11:11), Max: 97.8 (01-17-22 @ 11:11)  HR: 95 (01-17-22 @ 11:11) (86 - 95)  BP: 129/67 (01-17-22 @ 11:11) (122/63 - 129/67)  BP(mean): --  RR: 18 (01-17-22 @ 11:11) (18 - 18)  SpO2: 98% (01-17-22 @ 11:11) (97% - 98%)  Neck: No palpable thyroid nodules.  CVS: S1S2, No murmurs  Respiratory: No wheezing, no crepitations  GI: Abdomen soft, bowel sounds positive  Musculoskeletal:  edema lower extremities.     Diagnostics    Cortisol AM, Serum: AM Sched. Collection: 15-Paul-2022 06:00 (01-14 @ 08:01)  Free Thyroxine, Serum: AM Sched. Collection: 15-Paul-2022 06:00 (01-14 @ 08:01)  Thyroid Stimulating Hormone, Serum: AM Sched. Collection: 15-Paul-2022 06:00 (01-14 @ 08:01)

## 2022-01-17 NOTE — PROGRESS NOTE ADULT - ASSESSMENT
Assessment  DMT2: 71y Male with DM T2 with hyperglycemia, A1C 6.4%, was on insulin at home, on insulin coverage, blood sugars are trending within acceptable range, no hypoglycemic events.  Hypothyroidism: Patient has no history thyroid disease, was not on any thyroid supplements, subclinical with low-normal FT4, lethargic, started on synthroid 25 mcg po daily, repeat FT4 trending down, NPO switched to IV synhroid 25 mcg po daily  Hypercalcemia: Started on Sensipar 60mg daily, Ca fluctuating/remains elevated..  CHF: on medications, stable, monitored.  HTN: Controlled,  on antihypertensive medications.  Parkinsons: on meds, monitored.  CKD: Monitor labs/BMP      Dr Ayers  Cell : [phone]

## 2022-01-17 NOTE — PROGRESS NOTE ADULT - ASSESSMENT
72yo M w/ PMHx of CAD (s/p CABG 2019), CKD (unknown stage), DM2, Parkinson's Disease, HTN, depression presents with new onset acute heart failure exacerbation, NSTEMI, started on hep gtt, developed bl RP bleed, acute anemia. sp MICU course for hemorrhagic shock, 10/28 sp IR embolization l4 and l5 lumbar artery. sp permacath and AVF.    # Acute on chronic systolic and diastolic heart failure  # NSTEMI, medical management  # ESRD, new HD  # Atrial flutter  # acute hypoxic resp failure  # hemorrhagic shock, left RP hematoma, acute blood loss anemia sp embolization l4 and l5 lumbar artery 10/28  # lupus anticoagulant +  HD via permacath per renal, midodrine during dialysis  sp L AVF   12/13 pt pulled out permacath, replaced with new permacath 12/14  per renal, AVF needs more time to mature  hyponatremia- fu renal    # Parkinson's disease   # delirium  cont on depakote and cymbalta  c/w trihexyphenidyl, carbidopa/levodopa   cont seroquel 50mg qhs     # DM2 (diabetes mellitus, type 2)  per endo  hba1c 6.4    # CAD (coronary artery disease)  # HTN  lopressor, atorvastatin  asa on hold    # FTT  sp PEG via endoscopy  nutrition for tube feeds  mittens and abd binder    # surveillance COVID positive 12/28  asymptomatic off isolation  sating well on RA  doppler LE ro dvt    DVT ppx hsq    DNR/DNI    dispo: fu doppler -LE neg dvt,   started tube feeds  dc planning    [facility]  [phone]

## 2022-01-17 NOTE — PROGRESS NOTE ADULT - SUBJECTIVE AND OBJECTIVE BOX
Overnight events noted      VITAL:  T(C): , Max: 36.6 (01-17-22 @ 11:11)  T(F): , Max: 97.8 (01-17-22 @ 11:11)  HR: 95 (01-17-22 @ 11:11)  BP: 129/67 (01-17-22 @ 11:11)  BP(mean): --  RR: 18 (01-17-22 @ 11:11)  SpO2: 98% (01-17-22 @ 11:11)  Wt(kg): --      PHYSICAL EXAM:  Constitutional: NAD  Neck:  No JVD  Respiratory: diminished at bases   Cardiovascular: S1 and S2  Gastrointestinal: BS+, soft, NT/ND  Extremities: No peripheral edema  Neurological: decreased generalized strength   : No Javier  Skin: No rashes   Access: permacath; LUE AVF (+)thrill     LABS:    Na(127)/K(3.9)/Cl(92)/HCO3(23)/BUN(46)/Cr(4.25)Glu(187)/Ca(10.3)/Mg(--)/PO4(--)    01-15 @ 06:09      IMPRESSION: 71M w/ HTN, DM2, CAD-CABG, Parkinson's disease, and advanced CKD, 10/21/21 p/w LE swelling, c/b COVID; now newly ESRD-HD as of this admission    (1)Renal - ESRD-HD TTS - due for next HD tomorrow  (2)Hyponatremia - we can adjust the dialysis parameters (dialysate sodium, duration of HD) in effort to improve serum sodium  (3)CV - tenuous hemodyamics - on Midodrine pre-HD      RECOMMEND  (1)Next HD tomorrow - no net UF; 300ml/min BFR: temp 35.5; midodrine pre-HD; Na 140; duration 180min                Reji Musa MD  [facility]  Office: [phone]  Cell: [phone]       no pain, no sob      VITAL:  T(C): , Max: 36.6 (01-17-22 @ 11:11)  T(F): , Max: 97.8 (01-17-22 @ 11:11)  HR: 95 (01-17-22 @ 11:11)  BP: 129/67 (01-17-22 @ 11:11)  BP(mean): --  RR: 18 (01-17-22 @ 11:11)  SpO2: 98% (01-17-22 @ 11:11)  Wt(kg): --      PHYSICAL EXAM:  Constitutional: NAD  Neck:  No JVD  Respiratory: diminished at bases   Cardiovascular: S1 and S2  Gastrointestinal: BS+, soft, NT/ND  Extremities: No peripheral edema  Neurological: decreased generalized strength   : No Javier  Skin: No rashes   Access: RIJ permacath; LUE AVF (+)thrill     LABS:    Na(127)/K(3.9)/Cl(92)/HCO3(23)/BUN(46)/Cr(4.25)Glu(187)/Ca(10.3)/Mg(--)/PO4(--)    01-15 @ 06:09      IMPRESSION: 71M w/ HTN, DM2, CAD-CABG, Parkinson's disease, and advanced CKD, 10/21/21 p/w LE swelling, c/b COVID; now newly ESRD-HD as of this admission    (1)Renal - ESRD-HD TTS - due for next HD tomorrow  (2)Hyponatremia - we can adjust the dialysis parameters (dialysate sodium, duration of HD) in effort to improve serum sodium  (3)CV - tenuous hemodyamics - on Midodrine pre-HD      RECOMMEND  (1)Next HD tomorrow - no net UF; 300ml/min BFR: temp 35.5; midodrine pre-HD; Na 140; duration 180min                Reji Musa MD  [facility]  Office: [phone]  Cell: [phone]

## 2022-01-18 NOTE — PROGRESS NOTE ADULT - ASSESSMENT
72yo M with Hx CAD (s/p CABG), CKD, DM, Parkinson's disease who p/w LE swelling c/b COVID, now on HD.     PLAN:   - Fistula duplex form December demonstrates normal functioning fistula  - Please use AVF for HD      Ami Ashby, PGY-2  Vascular Surgery  #[id number]

## 2022-01-18 NOTE — PROGRESS NOTE ADULT - SUBJECTIVE AND OBJECTIVE BOX
Chief complaint    Patient is a 71y old  Male who presents with a chief complaint of leg swelling (18 Jan 2022 12:47)   Review of systems  Patient in bed, appears comfortable.    Labs and Fingersticks  CAPILLARY BLOOD GLUCOSE      POCT Blood Glucose.: 133 mg/dL (18 Jan 2022 11:59)  POCT Blood Glucose.: 200 mg/dL (18 Jan 2022 07:48)  POCT Blood Glucose.: 170 mg/dL (17 Jan 2022 23:43)  POCT Blood Glucose.: 201 mg/dL (17 Jan 2022 20:48)  POCT Blood Glucose.: 183 mg/dL (17 Jan 2022 17:07)      Anion Gap, Serum: 12 (01-18 @ 06:07)      Calcium, Total Serum: 10.9 *H* (01-18 @ 06:07)          01-18    123<L>  |  87<L>  |  75<H>  ----------------------------<  183<H>  4.5   |  24  |  4.46<H>    Ca    10.9<H>      18 Jan 2022 06:07  Phos  1.9     01-18  Mg     2.5     01-18                          7.4    8.69  )-----------( 175      ( 18 Jan 2022 06:07 )             24.1     Medications  MEDICATIONS  (STANDING):  atorvastatin 80 milliGRAM(s) Oral at bedtime  carbidopa/levodopa  25/100 2.5 Tablet(s) Oral <User Schedule>  carbidopa/levodopa  25/100 2 Tablet(s) Oral <User Schedule>  chlorhexidine 4% Liquid 1 Application(s) Topical <User Schedule>  cholecalciferol 1000 Unit(s) Oral daily  cinacalcet 60 milliGRAM(s) Oral daily  dextrose 40% Gel 15 Gram(s) Oral once  dextrose 5%. 1000 milliLiter(s) (50 mL/Hr) IV Continuous <Continuous>  dextrose 5%. 1000 milliLiter(s) (100 mL/Hr) IV Continuous <Continuous>  dextrose 50% Injectable 25 Gram(s) IV Push once  dextrose 50% Injectable 12.5 Gram(s) IV Push once  dextrose 50% Injectable 25 Gram(s) IV Push once  diVALproex Sprinkle 250 milliGRAM(s) Oral three times a day  DULoxetine 20 milliGRAM(s) Oral daily  folic acid 1 milliGRAM(s) Oral daily  glucagon  Injectable 1 milliGRAM(s) IntraMuscular once  heparin   Injectable 5000 Unit(s) SubCutaneous every 12 hours  insulin lispro (ADMELOG) corrective regimen sliding scale   SubCutaneous three times a day before meals  insulin lispro (ADMELOG) corrective regimen sliding scale   SubCutaneous at bedtime  levothyroxine 25 MICROGram(s) Oral daily  memantine 5 milliGRAM(s) Oral at bedtime  metoprolol tartrate 50 milliGRAM(s) Oral two times a day  midodrine 10 milliGRAM(s) Oral <User Schedule>  mirtazapine 7.5 milliGRAM(s) Oral daily  Nephro-celeste 1 Tablet(s) Oral daily  pantoprazole   Suspension 40 milliGRAM(s) Enteral Tube two times a day  polyethylene glycol 3350 17 Gram(s) Oral two times a day  QUEtiapine 50 milliGRAM(s) Oral at bedtime  senna Syrup 10 milliLiter(s) Oral at bedtime  trihexyphenidyl 2 milliGRAM(s) Oral three times a day      Physical Exam  General: Patient comfortable in bed  Vital Signs Last 12 Hrs  T(F): 97.4 (01-18-22 @ 12:19), Max: 97.8 (01-18-22 @ 04:39)  HR: 82 (01-18-22 @ 12:19) (77 - 84)  BP: 116/61 (01-18-22 @ 12:19) (110/68 - 116/61)  BP(mean): --  RR: 18 (01-18-22 @ 12:19) (17 - 18)  SpO2: 96% (01-18-22 @ 12:19) (96% - 99%)  Neck: No palpable thyroid nodules.  CVS: S1S2, No murmurs  Respiratory: No wheezing, no crepitations  GI: Abdomen soft, bowel sounds positive  Musculoskeletal:  edema lower extremities.     Diagnostics    Cortisol AM, Serum: AM Sched. Collection: 15-Paul-2022 06:00 (01-14 @ 08:01)  Free Thyroxine, Serum: AM Sched. Collection: 15-Paul-2022 06:00 (01-14 @ 08:01)  Thyroid Stimulating Hormone, Serum: AM Sched. Collection: 15-Paul-2022 06:00 (01-14 @ 08:01)

## 2022-01-18 NOTE — PROGRESS NOTE ADULT - ASSESSMENT
70yo M w/ PMHx of CAD (s/p CABG 2019), CKD (unknown stage), DM2, Parkinson's Disease, HTN, depression presents with new onset acute heart failure exacerbation, NSTEMI, started on hep gtt, developed bl RP bleed, acute anemia. sp MICU course for hemorrhagic shock, 10/28 sp IR embolization l4 and l5 lumbar artery. sp permacath and AVF.    # Acute on chronic systolic and diastolic heart failure  # NSTEMI, medical management  # ESRD, new HD  # Atrial flutter  # acute hypoxic resp failure  # hemorrhagic shock, left RP hematoma, acute blood loss anemia sp embolization l4 and l5 lumbar artery 10/28  # lupus anticoagulant +  HD via permacath per renal, midodrine during dialysis  sp L AVF - can be used now for HD  12/13 pt pulled out permacath, replaced with new permacath 12/14  hyponatremia- fu renal  vascular eval- AVF can be used for HD    # Parkinson's disease   # delirium  cont on depakote and cymbalta  c/w trihexyphenidyl, carbidopa/levodopa   cont seroquel 50mg qhs     # DM2 (diabetes mellitus, type 2)  per endo  hba1c 6.4    # CAD (coronary artery disease)  # HTN  lopressor, atorvastatin  asa on hold    # FTT  sp PEG via endoscopy  nutrition for tube feeds  mittens and abd binder    # surveillance COVID positive 12/28  asymptomatic off isolation  sating well on RA  doppler LE ro dvt    DVT ppx hsq    DNR/DNI    dispo: fu doppler -LE neg dvt,   started tube feeds  dc planning- pending AVF use for HD and eventual removal of permacath    Dr Rogers will be covering  starting 1/19/22  please call [facility] @ [phone] for questions or concerns

## 2022-01-18 NOTE — PROGRESS NOTE ADULT - SUBJECTIVE AND OBJECTIVE BOX
Chief Complaint:  Patient is a 71y old  Male who presents with a chief complaint of leg swelling (18 Jan 2022 12:05)      Date of service 01-18-22 @ 12:47      Interval Events:   Patient seen and examined. No acute overnight events. Last BM today.     Hospital Medications:  acetaminophen     Tablet .. 650 milliGRAM(s) Oral every 6 hours PRN  albuterol/ipratropium for Nebulization 3 milliLiter(s) Nebulizer every 6 hours PRN  atorvastatin 80 milliGRAM(s) Oral at bedtime  carbidopa/levodopa  25/100 2.5 Tablet(s) Oral <User Schedule>  carbidopa/levodopa  25/100 2 Tablet(s) Oral <User Schedule>  chlorhexidine 4% Liquid 1 Application(s) Topical <User Schedule>  cholecalciferol 1000 Unit(s) Oral daily  cinacalcet 60 milliGRAM(s) Oral daily  dextrose 40% Gel 15 Gram(s) Oral once  dextrose 5%. 1000 milliLiter(s) IV Continuous <Continuous>  dextrose 5%. 1000 milliLiter(s) IV Continuous <Continuous>  dextrose 50% Injectable 25 Gram(s) IV Push once  dextrose 50% Injectable 12.5 Gram(s) IV Push once  dextrose 50% Injectable 25 Gram(s) IV Push once  diVALproex Sprinkle 125 milliGRAM(s) Oral every 8 hours PRN  diVALproex Sprinkle 250 milliGRAM(s) Oral three times a day  DULoxetine 20 milliGRAM(s) Oral daily  folic acid 1 milliGRAM(s) Oral daily  glucagon  Injectable 1 milliGRAM(s) IntraMuscular once  guaiFENesin Oral Liquid (Sugar-Free) 200 milliGRAM(s) Oral every 6 hours PRN  heparin   Injectable 5000 Unit(s) SubCutaneous every 12 hours  insulin lispro (ADMELOG) corrective regimen sliding scale   SubCutaneous three times a day before meals  insulin lispro (ADMELOG) corrective regimen sliding scale   SubCutaneous at bedtime  levothyroxine 25 MICROGram(s) Oral daily  memantine 5 milliGRAM(s) Oral at bedtime  metoprolol tartrate 50 milliGRAM(s) Oral two times a day  midodrine 10 milliGRAM(s) Oral <User Schedule>  mirtazapine 7.5 milliGRAM(s) Oral daily  Nephro-celeste 1 Tablet(s) Oral daily  ondansetron Injectable 4 milliGRAM(s) IV Push once PRN  pantoprazole   Suspension 40 milliGRAM(s) Enteral Tube two times a day  polyethylene glycol 3350 17 Gram(s) Oral two times a day  QUEtiapine 50 milliGRAM(s) Oral at bedtime  QUEtiapine 12.5 milliGRAM(s) Oral every 6 hours PRN  senna Syrup 10 milliLiter(s) Oral at bedtime  sodium chloride 0.9% lock flush 10 milliLiter(s) IV Push every 1 hour PRN  sodium phosphate IVPB 15 milliMole(s) IV Intermittent once  trihexyphenidyl 2 milliGRAM(s) Oral three times a day        Review of Systems:  General:  No wt loss, fevers, chills, night sweats, fatigue,   Eyes:  Good vision, no reported pain  ENT:  No sore throat, pain, runny nose, dysphagia  CV:  No pain, palpitations, hypo/hypertension  Resp:  No dyspnea, cough, tachypnea, wheezing  GI:  See HPI  :  No pain, bleeding, incontinence, nocturia  Muscle:  No pain, weakness  Neuro:  No weakness, tingling, memory problems  Psych:  No fatigue, insomnia, mood problems, depression  Endocrine:  No polyuria, polydipsia, cold/heat intolerance  Heme:  No petechiae, ecchymosis, easy bruisability  Integumentary:  No rash, edema    PHYSICAL EXAM:   Vital Signs:  Vital Signs Last 24 Hrs  T(C): 36.3 (18 Jan 2022 12:19), Max: 36.8 (17 Jan 2022 21:27)  T(F): 97.4 (18 Jan 2022 12:19), Max: 98.3 (17 Jan 2022 21:27)  HR: 82 (18 Jan 2022 12:19) (77 - 84)  BP: 116/61 (18 Jan 2022 12:19) (110/68 - 130/70)  BP(mean): --  RR: 18 (18 Jan 2022 12:19) (18 - 18)  SpO2: 96% (18 Jan 2022 12:19) (96% - 99%)  Daily     Daily       PHYSICAL EXAM:     GENERAL:  Appears stated age, well-groomed, well-nourished, no distress  HEENT:  NC/AT,  conjunctivae anicteric, clear and pink,   NECK: supple, trachea midline  CHEST:  Full & symmetric excursion, no increased effort, breath sounds clear  HEART:  Regular rhythm, no JVD  ABDOMEN:  Soft, non-tender, non-distended, normoactive bowel sounds,  no masses , no hepatosplenomegaly  EXTREMITIES:  no cyanosis,clubbing or edema  SKIN:  No rash, erythema, or, ecchymoses, no jaundice  NEURO:  Alert, non-focal, no asterixis  PSYCH: Appropriate affect, oriented to place and time  RECTAL: Deferred      LABS Personally reviewed by me:                        7.4    8.69  )-----------( 175      ( 18 Jan 2022 06:07 )             24.1     Mean Cell Volume: 106.2 fl (01-18-22 @ 06:07)    01-18    123<L>  |  87<L>  |  75<H>  ----------------------------<  183<H>  4.5   |  24  |  4.46<H>    Ca    10.9<H>      18 Jan 2022 06:07  Phos  1.9     01-18  Mg     2.5     01-18        PT/INR - ( 18 Jan 2022 06:07 )   PT: 11.2 sec;   INR: 0.93 ratio         PTT - ( 18 Jan 2022 06:07 )  PTT:35.4 sec                            7.4    8.69  )-----------( 175      ( 18 Jan 2022 06:07 )             24.1       Imaging personally reviewed by me:

## 2022-01-18 NOTE — PROGRESS NOTE ADULT - SUBJECTIVE AND OBJECTIVE BOX
Overnight events noted      VITAL:  T(C): , Max: 36.8 (01-17-22 @ 21:27)  T(F): , Max: 98.3 (01-17-22 @ 21:27)  HR: 77 (01-18-22 @ 08:30)  BP: 110/68 (01-18-22 @ 08:30)  BP(mean): --  RR: 18 (01-18-22 @ 08:30)  SpO2: 99% (01-18-22 @ 08:30)      PHYSICAL EXAM:  Constitutional: NAD  Neck:  No JVD  Respiratory: diminished at bases   Cardiovascular: S1 and S2  Gastrointestinal: BS+, soft, NT/ND  Extremities: No peripheral edema  Neurological: decreased generalized strength   : No Javier  Skin: No rashes   Access: RIJ permacath; LUE AVF (+)thrill     LABS:                        7.4    8.69  )-----------( 175      ( 18 Jan 2022 06:07 )             24.1     Na(123)/K(4.5)/Cl(87)/HCO3(24)/BUN(75)/Cr(4.46)Glu(183)/Ca(10.9)/Mg(2.5)/PO4(1.9)    01-18 @ 06:07      IMPRESSION: 71M w/ HTN, DM2, CAD-CABG, Parkinson's disease, and advanced CKD, 10/21/21 p/w LE swelling, c/b COVID; now newly ESRD-HD as of this admission    (1)Renal - ESRD-HD TTS - due for next HD today  (2)Hyponatremia - should improve with HD today  (3)Hypercalcemia - also should improve with HD today  (4)Hypophosphatemia - likey to worsen today with HD  (5)CV - tenuous hemodyamics - on Midodrine pre-HD      RECOMMEND  (1)HD today as ordered  (2)Would give sodium phosphate 15mmol IV x 1 post-HD today              Reji Musa MD  [facility]  Office: [phone]  Cell: [phone]       Seen on HD- well-tolerating      VITAL:  T(C): , Max: 36.8 (01-17-22 @ 21:27)  T(F): , Max: 98.3 (01-17-22 @ 21:27)  HR: 77 (01-18-22 @ 08:30)  BP: 110/68 (01-18-22 @ 08:30)  BP(mean): --  RR: 18 (01-18-22 @ 08:30)  SpO2: 99% (01-18-22 @ 08:30)      PHYSICAL EXAM:  Constitutional: NAD  Neck:  No JVD  Respiratory: diminished at bases   Cardiovascular: S1 and S2  Gastrointestinal: BS+, soft, NT/ND  Extremities: No peripheral edema  Neurological: decreased generalized strength   : No Javier  Skin: No rashes   Access: RIJ permacath-accessed; JUAN AVF (+)thrill     LABS:                        7.4    8.69  )-----------( 175      ( 18 Jan 2022 06:07 )             24.1     Na(123)/K(4.5)/Cl(87)/HCO3(24)/BUN(75)/Cr(4.46)Glu(183)/Ca(10.9)/Mg(2.5)/PO4(1.9)    01-18 @ 06:07      IMPRESSION: 71M w/ HTN, DM2, CAD-CABG, Parkinson's disease, and advanced CKD, 10/21/21 p/w LE swelling, c/b COVID; now newly ESRD-HD as of this admission    (1)Renal - ESRD-HD TTS - on HD now  (2)Hyponatremia - should improve with HD today  (3)Hypercalcemia - also should improve with HD today  (4)Hypophosphatemia - likey to worsen today with HD  (5)CV - tenuous hemodyamics - on Midodrine pre-HD      RECOMMEND  (1)HD today as ordered  (2)Would give sodium phosphate 15mmol IV x 1 post-HD today              Reji Musa MD  [facility] [email or web address] [facility]  Office: [phone]  Cell: [phone]

## 2022-01-18 NOTE — PROGRESS NOTE ADULT - ASSESSMENT
71 M w volume overload, GI consulted for drop in hgb    1. CHF per cardio    2. ABBY on CKD per renal  -on HD via permacath per renal, midodrine during dialysis  -s/p L AVF, awaiting maturation     3. Drop in hgb, no overt GI Bleed, no bleeding around PEG site  -anemia receiving Retacrit   -continue to trend CBCs    4. RP bleed  s/p Abdominal Angiography and Embolization on 10/29/2021 by Interventional radiology of l4and l5 lumbar artery     5. Failure to thrive  -s/p PEG 1/10    -maintain abdominal binder and mittens   -may initiate tube feeds     6. stress duodenal ulcers  -PPI BID    7. Constipation  -resolved, BM 1/18  -nonobstructive gas pattern on x-ray 1/13  -continue Miralax BID      Attending supervision statement: I have personally seen and examined the patient. I fully participated in the care of this patient. I have made amendments to the documentation where necessary, and agree with the history, physical exam, and plan as outlined by the ACP.    [facility]  Gastroenterology and Hepatology  [address] [place], [place]  Office: [phone]  Cell: [phone]

## 2022-01-18 NOTE — PROGRESS NOTE ADULT - ASSESSMENT
Assessment  DMT2: 71y Male with DM T2 with hyperglycemia, A1C 6.4%, was on insulin at home, on insulin coverage, blood sugars are trending within acceptable range, no hypoglycemic events.  Hypothyroidism: Patient has no history thyroid disease, was not on any thyroid supplements, subclinical with low-normal FT4, lethargic, started on synthroid 25 mcg po daily, repeat FT4 trending down, NPO switched to IV synhroid 25 mcg po daily  Hypercalcemia: Started on Sensipar 60mg daily, Ca fluctuating/remains elevated..  CHF: on medications, stable, monitored.  HTN: Controlled,  on antihypertensive medications.  Parkinsons: on meds, monitored.  CKD: Monitor labs/BMP      Dr Ayers  Cell : [phone]

## 2022-01-18 NOTE — PROGRESS NOTE ADULT - SUBJECTIVE AND OBJECTIVE BOX
Subjective:  Patient seen at bedside this AM. Reports feeling well, without complaints. Denies chest pain, SOB.      24h Events:   - Overnight, no acute events    Objective:  Vital Signs  T(C): 36.1 (01-18 @ 08:30), Max: 36.8 (01-17 @ 21:27)  HR: 77 (01-18 @ 08:30) (77 - 84)  BP: 110/68 (01-18 @ 08:30) (110/68 - 130/70)  RR: 18 (01-18 @ 08:30) (18 - 18)  SpO2: 99% (01-18 @ 08:30) (96% - 99%)    Physical Exam:  GEN: resting in bed comfortably in NAD  NEURO: awake, alert  RESP: no increased WOB  EXTR: LUE brachiocephalic AVF with palpable thrill, palpable radial pulse, sensation + motor intact    Labs:             7.4    8.69  )-----------( 175      ( 18 Jan 2022 06:07 )             24.1     123<L>  |  87<L>  |  75<H>  ----------------------------<  183<H>  4.5   |  24  |  4.46<H>    Ca    10.9<H>      18 Jan 2022 06:07  Phos  1.9     01-18  Mg     2.5     01-18      POCT Blood Glucose.: 133 mg/dL (18 Jan 2022 11:59)  POCT Blood Glucose.: 200 mg/dL (18 Jan 2022 07:48)  POCT Blood Glucose.: 170 mg/dL (17 Jan 2022 23:43)  POCT Blood Glucose.: 201 mg/dL (17 Jan 2022 20:48)  POCT Blood Glucose.: 183 mg/dL (17 Jan 2022 17:07)    Medications:   MEDICATIONS  (STANDING):  atorvastatin 80 milliGRAM(s) Oral at bedtime  carbidopa/levodopa  25/100 2.5 Tablet(s) Oral <User Schedule>  carbidopa/levodopa  25/100 2 Tablet(s) Oral <User Schedule>  chlorhexidine 4% Liquid 1 Application(s) Topical <User Schedule>  cholecalciferol 1000 Unit(s) Oral daily  cinacalcet 60 milliGRAM(s) Oral daily  dextrose 40% Gel 15 Gram(s) Oral once  dextrose 5%. 1000 milliLiter(s) (50 mL/Hr) IV Continuous <Continuous>  dextrose 5%. 1000 milliLiter(s) (100 mL/Hr) IV Continuous <Continuous>  dextrose 50% Injectable 25 Gram(s) IV Push once  dextrose 50% Injectable 12.5 Gram(s) IV Push once  dextrose 50% Injectable 25 Gram(s) IV Push once  diVALproex Sprinkle 250 milliGRAM(s) Oral three times a day  DULoxetine 20 milliGRAM(s) Oral daily  folic acid 1 milliGRAM(s) Oral daily  glucagon  Injectable 1 milliGRAM(s) IntraMuscular once  heparin   Injectable 5000 Unit(s) SubCutaneous every 12 hours  insulin lispro (ADMELOG) corrective regimen sliding scale   SubCutaneous three times a day before meals  insulin lispro (ADMELOG) corrective regimen sliding scale   SubCutaneous at bedtime  levothyroxine 25 MICROGram(s) Oral daily  memantine 5 milliGRAM(s) Oral at bedtime  metoprolol tartrate 50 milliGRAM(s) Oral two times a day  midodrine 10 milliGRAM(s) Oral <User Schedule>  mirtazapine 7.5 milliGRAM(s) Oral daily  Nephro-celeste 1 Tablet(s) Oral daily  pantoprazole   Suspension 40 milliGRAM(s) Enteral Tube two times a day  polyethylene glycol 3350 17 Gram(s) Oral two times a day  QUEtiapine 50 milliGRAM(s) Oral at bedtime  senna Syrup 10 milliLiter(s) Oral at bedtime  sodium phosphate IVPB 15 milliMole(s) IV Intermittent once  trihexyphenidyl 2 milliGRAM(s) Oral three times a day    MEDICATIONS  (PRN):  acetaminophen     Tablet .. 650 milliGRAM(s) Oral every 6 hours PRN Mild Pain (1 - 3)  albuterol/ipratropium for Nebulization 3 milliLiter(s) Nebulizer every 6 hours PRN Shortness of Breath and/or Wheezing  diVALproex Sprinkle 125 milliGRAM(s) Oral every 8 hours PRN Agitation  guaiFENesin Oral Liquid (Sugar-Free) 200 milliGRAM(s) Oral every 6 hours PRN Cough  ondansetron Injectable 4 milliGRAM(s) IV Push once PRN Nausea and/or Vomiting  QUEtiapine 12.5 milliGRAM(s) Oral every 6 hours PRN agitation  sodium chloride 0.9% lock flush 10 milliLiter(s) IV Push every 1 hour PRN Pre/post blood products, medications, blood draw, and to maintain line patency

## 2022-01-18 NOTE — PROGRESS NOTE ADULT - SUBJECTIVE AND OBJECTIVE BOX
Patient is a 71y old  Male who presents with a chief complaint of leg swelling (18 Jan 2022 15:40)      INTERVAL HPI/OVERNIGHT EVENTS: noted  pt seen and examined this am   events noted  feels well      Vital Signs Last 24 Hrs  T(C): 36.3 (18 Jan 2022 12:19), Max: 36.8 (17 Jan 2022 21:27)  T(F): 97.4 (18 Jan 2022 12:19), Max: 98.3 (17 Jan 2022 21:27)  HR: 82 (18 Jan 2022 12:19) (77 - 84)  BP: 116/61 (18 Jan 2022 12:19) (110/68 - 130/70)  BP(mean): --  RR: 18 (18 Jan 2022 12:19) (17 - 18)  SpO2: 96% (18 Jan 2022 12:19) (96% - 99%)    acetaminophen     Tablet .. 650 milliGRAM(s) Oral every 6 hours PRN  albuterol/ipratropium for Nebulization 3 milliLiter(s) Nebulizer every 6 hours PRN  atorvastatin 80 milliGRAM(s) Oral at bedtime  carbidopa/levodopa  25/100 2.5 Tablet(s) Oral <User Schedule>  carbidopa/levodopa  25/100 2 Tablet(s) Oral <User Schedule>  chlorhexidine 4% Liquid 1 Application(s) Topical <User Schedule>  cholecalciferol 1000 Unit(s) Oral daily  cinacalcet 60 milliGRAM(s) Oral daily  dextrose 40% Gel 15 Gram(s) Oral once  dextrose 5%. 1000 milliLiter(s) IV Continuous <Continuous>  dextrose 5%. 1000 milliLiter(s) IV Continuous <Continuous>  dextrose 50% Injectable 25 Gram(s) IV Push once  dextrose 50% Injectable 12.5 Gram(s) IV Push once  dextrose 50% Injectable 25 Gram(s) IV Push once  diVALproex Sprinkle 125 milliGRAM(s) Oral every 8 hours PRN  diVALproex Sprinkle 250 milliGRAM(s) Oral three times a day  DULoxetine 20 milliGRAM(s) Oral daily  folic acid 1 milliGRAM(s) Oral daily  glucagon  Injectable 1 milliGRAM(s) IntraMuscular once  guaiFENesin Oral Liquid (Sugar-Free) 200 milliGRAM(s) Oral every 6 hours PRN  heparin   Injectable 5000 Unit(s) SubCutaneous every 12 hours  insulin lispro (ADMELOG) corrective regimen sliding scale   SubCutaneous three times a day before meals  insulin lispro (ADMELOG) corrective regimen sliding scale   SubCutaneous at bedtime  levothyroxine 25 MICROGram(s) Oral daily  memantine 5 milliGRAM(s) Oral at bedtime  metoprolol tartrate 50 milliGRAM(s) Oral two times a day  midodrine 10 milliGRAM(s) Oral <User Schedule>  mirtazapine 7.5 milliGRAM(s) Oral daily  Nephro-celeste 1 Tablet(s) Oral daily  ondansetron Injectable 4 milliGRAM(s) IV Push once PRN  pantoprazole   Suspension 40 milliGRAM(s) Enteral Tube two times a day  polyethylene glycol 3350 17 Gram(s) Oral two times a day  QUEtiapine 50 milliGRAM(s) Oral at bedtime  QUEtiapine 12.5 milliGRAM(s) Oral every 6 hours PRN  senna Syrup 10 milliLiter(s) Oral at bedtime  sodium chloride 0.9% lock flush 10 milliLiter(s) IV Push every 1 hour PRN  trihexyphenidyl 2 milliGRAM(s) Oral three times a day      PHYSICAL EXAM:  GENERAL: NAD,   EYES: conjunctiva and sclera clear  ENMT: Moist mucous membranes  NECK: Supple, No JVD, Normal thyroid  CHEST/LUNG: non labored, cta b/l  HEART: Regular rate and rhythm; No murmurs, rubs, or gallops  ABDOMEN: Soft, Nontender, Nondistended; Bowel sounds present  EXTREMITIES:  2+ Peripheral Pulses, No clubbing, cyanosis, or edema  LYMPH: No lymphadenopathy noted  SKIN: No rashes or lesions    Consultant(s) Notes Reviewed:  [x ] YES  [ ] NO  Care Discussed with Consultants/Other Providers [ x] YES  [ ] NO    LABS:                        7.4    8.69  )-----------( 175      ( 18 Jan 2022 06:07 )             24.1     01-18    123<L>  |  87<L>  |  75<H>  ----------------------------<  183<H>  4.5   |  24  |  4.46<H>    Ca    10.9<H>      18 Jan 2022 06:07  Phos  1.9     01-18  Mg     2.5     01-18      PT/INR - ( 18 Jan 2022 06:07 )   PT: 11.2 sec;   INR: 0.93 ratio         PTT - ( 18 Jan 2022 06:07 )  PTT:35.4 sec    CAPILLARY BLOOD GLUCOSE      POCT Blood Glucose.: 238 mg/dL (18 Jan 2022 16:49)  POCT Blood Glucose.: 133 mg/dL (18 Jan 2022 11:59)  POCT Blood Glucose.: 200 mg/dL (18 Jan 2022 07:48)  POCT Blood Glucose.: 170 mg/dL (17 Jan 2022 23:43)  POCT Blood Glucose.: 201 mg/dL (17 Jan 2022 20:48)              RADIOLOGY & ADDITIONAL TESTS:    Imaging Personally Reviewed:  [x ] YES  [ ] NO

## 2022-01-19 NOTE — PROGRESS NOTE ADULT - ASSESSMENT
72yo M w/ PMHx of CAD (s/p CABG 2019), CKD (unknown stage), DM2, Parkinson's Disease, HTN, depression presents with new onset acute heart failure exacerbation, NSTEMI, started on hep gtt, developed bl RP bleed, acute anemia. sp MICU course for hemorrhagic shock, 10/28 sp IR embolization l4 and l5 lumbar artery. sp permacath and AVF.    # Acute on chronic systolic and diastolic heart failure  # NSTEMI, medical management  # ESRD, new HD  # Atrial flutter  # acute hypoxic resp failure  # hemorrhagic shock, left RP hematoma, acute blood loss anemia sp embolization l4 and l5 lumbar artery 10/28  # lupus anticoagulant +  # hyponatremia  HD via permacath per renal, midodrine during dialysis  sp L AVF - can be used now for HD  12/13 pt pulled out permacath, replaced with new permacath 12/14  hyponatremia- fu renal  vascular eval- AVF can be used for HD    # Parkinson's disease   # delirium  cont on depakote and cymbalta  c/w trihexyphenidyl, carbidopa/levodopa   cont seroquel 50mg qhs     # DM2 (diabetes mellitus, type 2)  per endo  hba1c 6.4    # CAD (coronary artery disease)  # HTN  lopressor, atorvastatin  asa on hold    # FTT  sp PEG via endoscopy  nutrition for tube feeds  mittens and abd binder    # surveillance COVID positive 12/28  asymptomatic off isolation  sating well on RA    DVT ppx hsq    DNR/DNI    dc planning- pending AVF use for HD and eventual removal of permacath    please call [facility] @ [phone] for questions or concerns 70yo M w/ PMHx of CAD (s/p CABG 2019), CKD (unknown stage), DM2, Parkinson's Disease, HTN, depression presents with new onset acute heart failure exacerbation, NSTEMI, started on hep gtt, developed bl RP bleed, acute anemia. sp MICU course for hemorrhagic shock, 10/28 sp IR embolization l4 and l5 lumbar artery. sp permacath and AVF.    # Acute on chronic systolic and diastolic heart failure  # NSTEMI, medical management  # ESRD, new HD  # Atrial flutter  # acute hypoxic resp failure  # hemorrhagic shock, left RP hematoma, acute blood loss anemia sp embolization l4 and l5 lumbar artery 10/28  # lupus anticoagulant +  # hyponatremia  HD via permacath per renal, midodrine during dialysis  sp L AVF - can be used now for HD  12/13 pt pulled out permacath, replaced with new permacath 12/14  hyponatremia- fu renal  vascular eval- AVF can be used for HD, fu renal    # Parkinson's disease   # delirium  cont on depakote and cymbalta  c/w trihexyphenidyl, carbidopa/levodopa   cont seroquel 50mg qhs     # DM2 (diabetes mellitus, type 2)  per endo  hba1c 6.4    # CAD (coronary artery disease)  # HTN  lopressor, atorvastatin  asa on hold    # FTT  sp PEG via endoscopy  nutrition for tube feeds  mittens and abd binder    # surveillance COVID positive 12/28  asymptomatic off isolation  sating well on RA    DVT ppx hsq    DNR/DNI    dc planning- pending AVF use for HD and eventual removal of permacath    please call [facility] @ [phone] for questions or concerns

## 2022-01-19 NOTE — PROGRESS NOTE ADULT - SUBJECTIVE AND OBJECTIVE BOX
INTERVAL HPI/OVERNIGHT EVENTS:  pt seen and examined. Tolerating PEG bolus feeds  denies abdominal pain, n/v    MEDICATIONS  (STANDING):  atorvastatin 80 milliGRAM(s) Oral at bedtime  carbidopa/levodopa  25/100 2.5 Tablet(s) Oral <User Schedule>  carbidopa/levodopa  25/100 2 Tablet(s) Oral <User Schedule>  chlorhexidine 4% Liquid 1 Application(s) Topical <User Schedule>  cinacalcet 60 milliGRAM(s) Oral daily  dextrose 40% Gel 15 Gram(s) Oral once  dextrose 5%. 1000 milliLiter(s) (50 mL/Hr) IV Continuous <Continuous>  dextrose 5%. 1000 milliLiter(s) (100 mL/Hr) IV Continuous <Continuous>  dextrose 50% Injectable 25 Gram(s) IV Push once  dextrose 50% Injectable 12.5 Gram(s) IV Push once  dextrose 50% Injectable 25 Gram(s) IV Push once  diVALproex Sprinkle 250 milliGRAM(s) Oral three times a day  DULoxetine 20 milliGRAM(s) Oral daily  folic acid 1 milliGRAM(s) Oral daily  glucagon  Injectable 1 milliGRAM(s) IntraMuscular once  heparin   Injectable 5000 Unit(s) SubCutaneous every 12 hours  insulin lispro (ADMELOG) corrective regimen sliding scale   SubCutaneous three times a day before meals  insulin lispro (ADMELOG) corrective regimen sliding scale   SubCutaneous at bedtime  levothyroxine 25 MICROGram(s) Oral daily  memantine 5 milliGRAM(s) Oral at bedtime  metoprolol tartrate 50 milliGRAM(s) Oral two times a day  midodrine 10 milliGRAM(s) Oral <User Schedule>  mirtazapine 7.5 milliGRAM(s) Oral daily  Nephro-celeste 1 Tablet(s) Oral daily  pantoprazole   Suspension 40 milliGRAM(s) Enteral Tube two times a day  polyethylene glycol 3350 17 Gram(s) Oral two times a day  QUEtiapine 50 milliGRAM(s) Oral at bedtime  senna Syrup 10 milliLiter(s) Oral at bedtime  trihexyphenidyl 2 milliGRAM(s) Oral three times a day    MEDICATIONS  (PRN):  acetaminophen     Tablet .. 650 milliGRAM(s) Oral every 6 hours PRN Mild Pain (1 - 3)  albuterol/ipratropium for Nebulization 3 milliLiter(s) Nebulizer every 6 hours PRN Shortness of Breath and/or Wheezing  diVALproex Sprinkle 125 milliGRAM(s) Oral every 8 hours PRN Agitation  guaiFENesin Oral Liquid (Sugar-Free) 200 milliGRAM(s) Oral every 6 hours PRN Cough  ondansetron Injectable 4 milliGRAM(s) IV Push once PRN Nausea and/or Vomiting  QUEtiapine 12.5 milliGRAM(s) Oral every 6 hours PRN agitation  sodium chloride 0.9% lock flush 10 milliLiter(s) IV Push every 1 hour PRN Pre/post blood products, medications, blood draw, and to maintain line patency      Allergies    adhesives (Rash)  latex (Urticaria)  No Known Drug Allergies    Intolerances        Review of Systems:    General:  No wt loss, fevers, chills, night sweats,fatigue,   Eyes:  Good vision, no reported pain  ENT:  No sore throat, pain, runny nose, dysphagia  CV:  No pain, palpitations, hypo/hypertension  Resp:  No dyspnea, cough, tachypnea, wheezing  GI:  see HPI  :  No pain, bleeding, incontinence, nocturia  Muscle:  No pain, weakness  Neuro:  No weakness, tingling, memory problems  Psych:  No fatigue, insomnia, mood problems, depression  Endocrine:  No polyuria, polydypsia, cold/heat intolerance  Heme:  No petechiae, ecchymosis, easy bruisability  Skin:  No rash, tattoos, scars, edema      Vital Signs Last 24 Hrs  T(C): 36.5 (19 Jan 2022 12:14), Max: 36.8 (18 Jan 2022 21:32)  T(F): 97.7 (19 Jan 2022 12:14), Max: 98.2 (18 Jan 2022 21:32)  HR: 101 (19 Jan 2022 11:02) (81 - 101)  BP: 122/65 (19 Jan 2022 11:02) (122/65 - 136/73)  BP(mean): --  RR: 18 (19 Jan 2022 11:02) (18 - 18)  SpO2: 96% (19 Jan 2022 11:02) (96% - 97%)    PHYSICAL EXAM:    GENERAL:  Appears stated age, well-groomed, well-nourished, no distress  HEENT:  NC/AT,  conjunctivae anicteric, clear and pink,   NECK: supple, trachea midline  CHEST:  Full & symmetric excursion, no increased effort, breath sounds clear  HEART:  Regular rhythm, no JVD  ABDOMEN:  Soft, non-tender, non-distended, normoactive bowel sounds,  no masses , no hepatosplenomegaly, + PEG c/d/i   EXTREMITIES:  no cyanosis,clubbing or edema  SKIN:  No rash, erythema, or, ecchymoses, no jaundice  NEURO:  Alert, non-focal, no asterixis  PSYCH: Appropriate affect, oriented to place and time  RECTAL: Deferred        LABS:                        7.5    8.62  )-----------( 187      ( 19 Jan 2022 05:51 )             24.2     01-19    129<L>  |  93<L>  |  43<H>  ----------------------------<  144<H>  3.8   |  26  |  2.70<H>    Ca    10.4      19 Jan 2022 05:51  Phos  1.9     01-18  Mg     2.5     01-18      PT/INR - ( 18 Jan 2022 06:07 )   PT: 11.2 sec;   INR: 0.93 ratio         PTT - ( 18 Jan 2022 06:07 )  PTT:35.4 sec      RADIOLOGY & ADDITIONAL TESTS:

## 2022-01-19 NOTE — PROGRESS NOTE ADULT - ASSESSMENT
71 M w volume overload, GI consulted for drop in hgb    1. CHF per cardio    2. ABBY on CKD per renal  -on HD via permacath per renal, midodrine during dialysis  -s/p L AVF, awaiting maturation     3. Drop in hgb, no overt GI Bleed, no bleeding around PEG site  -anemia receiving Retacrit   -continue to trend CBCs  - now has stabilized    4. RP bleed  s/p Abdominal Angiography and Embolization on 10/29/2021 by Interventional radiology of l4and l5 lumbar artery     5. Failure to thrive  -s/p PEG 1/10    -maintain abdominal binder and mittens   -may initiate tube feeds     6. stress duodenal ulcers  -PPI BID    7. Constipation  -resolved, BM 1/18  -nonobstructive gas pattern on x-ray 1/13  -continue Miralax BID    Attending supervision statement: I have personally seen and examined the patient. I fully participated in the care of this patient. I have made amendments to the documentation where necessary, and agree with the history, physical exam, and plan as outlined by the ACP.    [facility]  Gastroenterology and Hepatology  [address] [place], [place]  Office: [phone]  Cell: [phone]

## 2022-01-19 NOTE — PROGRESS NOTE ADULT - SUBJECTIVE AND OBJECTIVE BOX
Patient is a 71y old  Male who presents with a chief complaint of leg swelling (19 Jan 2022 07:43)      SUBJECTIVE / OVERNIGHT EVENTS:    Patient seen and examined.   no acute events.    Vital Signs Last 24 Hrs  T(C): 36.5 (19 Jan 2022 12:14), Max: 36.8 (18 Jan 2022 21:32)  T(F): 97.7 (19 Jan 2022 12:14), Max: 98.2 (18 Jan 2022 21:32)  HR: 101 (19 Jan 2022 11:02) (81 - 101)  BP: 122/65 (19 Jan 2022 11:02) (122/65 - 136/73)  BP(mean): --  RR: 18 (19 Jan 2022 11:02) (18 - 18)  SpO2: 96% (19 Jan 2022 11:02) (96% - 97%)  I&O's Summary    18 Jan 2022 07:01  -  19 Jan 2022 07:00  --------------------------------------------------------  IN: 660 mL / OUT: 0 mL / NET: 660 mL        PE:  GENERAL: NAD, AAOx1-2  HEAD:  Atraumatic, Normocephalic  EYES: conjunctiva and sclera clear  NECK:  No JVD  CHEST/LUNG: CTABL, No wheeze  HEART: Regular rate and rhythm; no murmur  ABDOMEN: Soft, Nontender, Nondistended; Bowel sounds present, peg  EXTREMITIES:  2+ Peripheral Pulses, No clubbing, cyanosis, or edema  SKIN: No rashes or lesions  NEURO: No focal deficits    LABS:                        7.5    8.62  )-----------( 187      ( 19 Jan 2022 05:51 )             24.2     01-19    129<L>  |  93<L>  |  43<H>  ----------------------------<  144<H>  3.8   |  26  |  2.70<H>    Ca    10.4      19 Jan 2022 05:51  Phos  1.9     01-18  Mg     2.5     01-18      PT/INR - ( 18 Jan 2022 06:07 )   PT: 11.2 sec;   INR: 0.93 ratio         PTT - ( 18 Jan 2022 06:07 )  PTT:35.4 sec  CAPILLARY BLOOD GLUCOSE      POCT Blood Glucose.: 139 mg/dL (19 Jan 2022 12:09)  POCT Blood Glucose.: 139 mg/dL (19 Jan 2022 05:30)  POCT Blood Glucose.: 112 mg/dL (19 Jan 2022 00:17)  POCT Blood Glucose.: 134 mg/dL (18 Jan 2022 21:35)  POCT Blood Glucose.: 238 mg/dL (18 Jan 2022 16:49)            RADIOLOGY & ADDITIONAL TESTS:    Imaging Personally Reviewed:  [x] YES  [ ] NO    Consultant(s) Notes Reviewed:  [x] YES  [ ] NO    MEDICATIONS  (STANDING):  atorvastatin 80 milliGRAM(s) Oral at bedtime  carbidopa/levodopa  25/100 2.5 Tablet(s) Oral <User Schedule>  carbidopa/levodopa  25/100 2 Tablet(s) Oral <User Schedule>  chlorhexidine 4% Liquid 1 Application(s) Topical <User Schedule>  cinacalcet 60 milliGRAM(s) Oral daily  dextrose 40% Gel 15 Gram(s) Oral once  dextrose 5%. 1000 milliLiter(s) (50 mL/Hr) IV Continuous <Continuous>  dextrose 5%. 1000 milliLiter(s) (100 mL/Hr) IV Continuous <Continuous>  dextrose 50% Injectable 25 Gram(s) IV Push once  dextrose 50% Injectable 12.5 Gram(s) IV Push once  dextrose 50% Injectable 25 Gram(s) IV Push once  diVALproex Sprinkle 250 milliGRAM(s) Oral three times a day  DULoxetine 20 milliGRAM(s) Oral daily  folic acid 1 milliGRAM(s) Oral daily  glucagon  Injectable 1 milliGRAM(s) IntraMuscular once  heparin   Injectable 5000 Unit(s) SubCutaneous every 12 hours  insulin lispro (ADMELOG) corrective regimen sliding scale   SubCutaneous three times a day before meals  insulin lispro (ADMELOG) corrective regimen sliding scale   SubCutaneous at bedtime  levothyroxine 25 MICROGram(s) Oral daily  memantine 5 milliGRAM(s) Oral at bedtime  metoprolol tartrate 50 milliGRAM(s) Oral two times a day  midodrine 10 milliGRAM(s) Oral <User Schedule>  mirtazapine 7.5 milliGRAM(s) Oral daily  Nephro-celeste 1 Tablet(s) Oral daily  pantoprazole   Suspension 40 milliGRAM(s) Enteral Tube two times a day  polyethylene glycol 3350 17 Gram(s) Oral two times a day  QUEtiapine 50 milliGRAM(s) Oral at bedtime  senna Syrup 10 milliLiter(s) Oral at bedtime  trihexyphenidyl 2 milliGRAM(s) Oral three times a day    MEDICATIONS  (PRN):  acetaminophen     Tablet .. 650 milliGRAM(s) Oral every 6 hours PRN Mild Pain (1 - 3)  albuterol/ipratropium for Nebulization 3 milliLiter(s) Nebulizer every 6 hours PRN Shortness of Breath and/or Wheezing  diVALproex Sprinkle 125 milliGRAM(s) Oral every 8 hours PRN Agitation  guaiFENesin Oral Liquid (Sugar-Free) 200 milliGRAM(s) Oral every 6 hours PRN Cough  ondansetron Injectable 4 milliGRAM(s) IV Push once PRN Nausea and/or Vomiting  QUEtiapine 12.5 milliGRAM(s) Oral every 6 hours PRN agitation  sodium chloride 0.9% lock flush 10 milliLiter(s) IV Push every 1 hour PRN Pre/post blood products, medications, blood draw, and to maintain line patency      Care Discussed with Consultants/Other Providers [x] YES  [ ] NO    HEALTH ISSUES - PROBLEM Dx:  Acute heart failure    Atrial flutter    DM2 (diabetes mellitus, type 2)    CAD (coronary artery disease)    Parkinsons disease    Acute on chronic renal failure    NSTEMI (non-ST elevation myocardial infarction)    Hypertension    Acute kidney injury superimposed on CKD    Anemia    Hypothyroidism    Delirium    Advanced care planning/counseling discussion    Palliative care status    Palliative care encounter    Pain    Stage 5 chronic kidney disease not on chronic dialysis    Hypercalcemia    Preop cardiovascular exam         Patient is a 71y old  Male who presents with a chief complaint of leg swelling (19 Jan 2022 07:43)      SUBJECTIVE / OVERNIGHT EVENTS:    Patient seen and examined.   no acute events. confused.    Vital Signs Last 24 Hrs  T(C): 36.5 (19 Jan 2022 12:14), Max: 36.8 (18 Jan 2022 21:32)  T(F): 97.7 (19 Jan 2022 12:14), Max: 98.2 (18 Jan 2022 21:32)  HR: 101 (19 Jan 2022 11:02) (81 - 101)  BP: 122/65 (19 Jan 2022 11:02) (122/65 - 136/73)  BP(mean): --  RR: 18 (19 Jan 2022 11:02) (18 - 18)  SpO2: 96% (19 Jan 2022 11:02) (96% - 97%)  I&O's Summary    18 Jan 2022 07:01  -  19 Jan 2022 07:00  --------------------------------------------------------  IN: 660 mL / OUT: 0 mL / NET: 660 mL        PE:  GENERAL: NAD, AAOx1-2  HEAD:  Atraumatic, Normocephalic  EYES: conjunctiva and sclera clear  NECK:  No JVD  CHEST/LUNG: CTABL, No wheeze, right chest wall permacath  HEART: Regular rate and rhythm; no murmur  ABDOMEN: Soft, Nontender, Nondistended; Bowel sounds present, peg  EXTREMITIES:  2+ Peripheral Pulses, No clubbing, cyanosis, or edema, left arm avf  NEURO: No focal deficits    LABS:                        7.5    8.62  )-----------( 187      ( 19 Jan 2022 05:51 )             24.2     01-19    129<L>  |  93<L>  |  43<H>  ----------------------------<  144<H>  3.8   |  26  |  2.70<H>    Ca    10.4      19 Jan 2022 05:51  Phos  1.9     01-18  Mg     2.5     01-18      PT/INR - ( 18 Jan 2022 06:07 )   PT: 11.2 sec;   INR: 0.93 ratio         PTT - ( 18 Jan 2022 06:07 )  PTT:35.4 sec  CAPILLARY BLOOD GLUCOSE      POCT Blood Glucose.: 139 mg/dL (19 Jan 2022 12:09)  POCT Blood Glucose.: 139 mg/dL (19 Jan 2022 05:30)  POCT Blood Glucose.: 112 mg/dL (19 Jan 2022 00:17)  POCT Blood Glucose.: 134 mg/dL (18 Jan 2022 21:35)  POCT Blood Glucose.: 238 mg/dL (18 Jan 2022 16:49)            RADIOLOGY & ADDITIONAL TESTS:    Imaging Personally Reviewed:  [x] YES  [ ] NO    Consultant(s) Notes Reviewed:  [x] YES  [ ] NO    MEDICATIONS  (STANDING):  atorvastatin 80 milliGRAM(s) Oral at bedtime  carbidopa/levodopa  25/100 2.5 Tablet(s) Oral <User Schedule>  carbidopa/levodopa  25/100 2 Tablet(s) Oral <User Schedule>  chlorhexidine 4% Liquid 1 Application(s) Topical <User Schedule>  cinacalcet 60 milliGRAM(s) Oral daily  dextrose 40% Gel 15 Gram(s) Oral once  dextrose 5%. 1000 milliLiter(s) (50 mL/Hr) IV Continuous <Continuous>  dextrose 5%. 1000 milliLiter(s) (100 mL/Hr) IV Continuous <Continuous>  dextrose 50% Injectable 25 Gram(s) IV Push once  dextrose 50% Injectable 12.5 Gram(s) IV Push once  dextrose 50% Injectable 25 Gram(s) IV Push once  diVALproex Sprinkle 250 milliGRAM(s) Oral three times a day  DULoxetine 20 milliGRAM(s) Oral daily  folic acid 1 milliGRAM(s) Oral daily  glucagon  Injectable 1 milliGRAM(s) IntraMuscular once  heparin   Injectable 5000 Unit(s) SubCutaneous every 12 hours  insulin lispro (ADMELOG) corrective regimen sliding scale   SubCutaneous three times a day before meals  insulin lispro (ADMELOG) corrective regimen sliding scale   SubCutaneous at bedtime  levothyroxine 25 MICROGram(s) Oral daily  memantine 5 milliGRAM(s) Oral at bedtime  metoprolol tartrate 50 milliGRAM(s) Oral two times a day  midodrine 10 milliGRAM(s) Oral <User Schedule>  mirtazapine 7.5 milliGRAM(s) Oral daily  Nephro-celeste 1 Tablet(s) Oral daily  pantoprazole   Suspension 40 milliGRAM(s) Enteral Tube two times a day  polyethylene glycol 3350 17 Gram(s) Oral two times a day  QUEtiapine 50 milliGRAM(s) Oral at bedtime  senna Syrup 10 milliLiter(s) Oral at bedtime  trihexyphenidyl 2 milliGRAM(s) Oral three times a day    MEDICATIONS  (PRN):  acetaminophen     Tablet .. 650 milliGRAM(s) Oral every 6 hours PRN Mild Pain (1 - 3)  albuterol/ipratropium for Nebulization 3 milliLiter(s) Nebulizer every 6 hours PRN Shortness of Breath and/or Wheezing  diVALproex Sprinkle 125 milliGRAM(s) Oral every 8 hours PRN Agitation  guaiFENesin Oral Liquid (Sugar-Free) 200 milliGRAM(s) Oral every 6 hours PRN Cough  ondansetron Injectable 4 milliGRAM(s) IV Push once PRN Nausea and/or Vomiting  QUEtiapine 12.5 milliGRAM(s) Oral every 6 hours PRN agitation  sodium chloride 0.9% lock flush 10 milliLiter(s) IV Push every 1 hour PRN Pre/post blood products, medications, blood draw, and to maintain line patency      Care Discussed with Consultants/Other Providers [x] YES  [ ] NO    HEALTH ISSUES - PROBLEM Dx:  Acute heart failure    Atrial flutter    DM2 (diabetes mellitus, type 2)    CAD (coronary artery disease)    Parkinsons disease    Acute on chronic renal failure    NSTEMI (non-ST elevation myocardial infarction)    Hypertension    Acute kidney injury superimposed on CKD    Anemia    Hypothyroidism    Delirium    Advanced care planning/counseling discussion    Palliative care status    Palliative care encounter    Pain    Stage 5 chronic kidney disease not on chronic dialysis    Hypercalcemia    Preop cardiovascular exam

## 2022-01-19 NOTE — PROGRESS NOTE ADULT - ASSESSMENT
71M w/ HTN, DM2, CAD-CABG, Parkinson's disease, and advanced CKD, 10/21/21 p/w LE swelling, c/b COVID; now newly ESRD-HD as of this admission    (1)Renal - ESRD-HD TTS - on HD now  (2)Hyponatremia - will do with higher sodium bath   (3)Hypercalcemia - WIll lower calcium bath   (4)Hypophosphatemia -   (5)CV - tenuous hemodyamics - on Midodrine pre-HD      RECOMMEND  (1)HD in am; DC Vit d   (2)SP  sodium phosphate 15mmol IV x 1              71M w/ HTN, DM2, CAD-CABG, Parkinson's disease, and advanced CKD, 10/21/21 p/w LE swelling, c/b COVID; now newly ESRD-HD as of this admission    (1)Renal - ESRD-HD TTS   (2)Hyponatremia - will do with higher sodium bath   (3)Hypercalcemia - WIll lower calcium bath   (4)Hypophosphatemia -   (5)CV - tenuous hemodyamics - on Midodrine pre-HD      RECOMMEND  (1)HD in am; DC Vit d   (2)SP  sodium phosphate 15mmol IV x 1   (3)Nutritional support     [facility]   [facility]   [phone]

## 2022-01-19 NOTE — PROGRESS NOTE ADULT - SUBJECTIVE AND OBJECTIVE BOX
[facility] [phone]        Patient seen and examined         MEDICATIONS  (STANDING):  atorvastatin 80 milliGRAM(s) Oral at bedtime  carbidopa/levodopa  25/100 2.5 Tablet(s) Oral <User Schedule>  carbidopa/levodopa  25/100 2 Tablet(s) Oral <User Schedule>  chlorhexidine 4% Liquid 1 Application(s) Topical <User Schedule>  cholecalciferol 1000 Unit(s) Oral daily  cinacalcet 60 milliGRAM(s) Oral daily  dextrose 40% Gel 15 Gram(s) Oral once  dextrose 5%. 1000 milliLiter(s) (50 mL/Hr) IV Continuous <Continuous>  dextrose 5%. 1000 milliLiter(s) (100 mL/Hr) IV Continuous <Continuous>  dextrose 50% Injectable 25 Gram(s) IV Push once  dextrose 50% Injectable 12.5 Gram(s) IV Push once  dextrose 50% Injectable 25 Gram(s) IV Push once  diVALproex Sprinkle 250 milliGRAM(s) Oral three times a day  DULoxetine 20 milliGRAM(s) Oral daily  folic acid 1 milliGRAM(s) Oral daily  glucagon  Injectable 1 milliGRAM(s) IntraMuscular once  heparin   Injectable 5000 Unit(s) SubCutaneous every 12 hours  insulin lispro (ADMELOG) corrective regimen sliding scale   SubCutaneous three times a day before meals  insulin lispro (ADMELOG) corrective regimen sliding scale   SubCutaneous at bedtime  levothyroxine 25 MICROGram(s) Oral daily  memantine 5 milliGRAM(s) Oral at bedtime  metoprolol tartrate 50 milliGRAM(s) Oral two times a day  midodrine 10 milliGRAM(s) Oral <User Schedule>  mirtazapine 7.5 milliGRAM(s) Oral daily  Nephro-celeste 1 Tablet(s) Oral daily  pantoprazole   Suspension 40 milliGRAM(s) Enteral Tube two times a day  polyethylene glycol 3350 17 Gram(s) Oral two times a day  QUEtiapine 50 milliGRAM(s) Oral at bedtime  senna Syrup 10 milliLiter(s) Oral at bedtime  trihexyphenidyl 2 milliGRAM(s) Oral three times a day      VITAL:  T(C): , Max: 36.8 (01-18-22 @ 21:32)  T(F): , Max: 98.2 (01-18-22 @ 21:32)  HR: 82 (01-19-22 @ 04:42)  BP: 136/73 (01-19-22 @ 04:42)  BP(mean): --  RR: 18 (01-19-22 @ 04:42)  SpO2: 97% (01-19-22 @ 04:42)  Wt(kg): --    I and O's:    01-18 @ 07:01  -  01-19 @ 07:00  --------------------------------------------------------  IN: 660 mL / OUT: 0 mL / NET: 660 mL          PHYSICAL EXAM:    Constitutional: NAD  Neck:  No JVD  Respiratory: CTAB/L  Cardiovascular: S1 and S2  Gastrointestinal: BS+, soft, NT/ND  Extremities: No peripheral edema  Neurological: A/O x 3, no focal deficits  Psychiatric: Normal mood, normal affect  : No Javier  Skin: No rashes  Access: Not applicable    LABS:                        7.5    8.62  )-----------( 187      ( 19 Jan 2022 05:51 )             24.2     01-19    129<L>  |  93<L>  |  43<H>  ----------------------------<  144<H>  3.8   |  26  |  2.70<H>    Ca    10.4      19 Jan 2022 05:51  Phos  1.9     01-18  Mg     2.5     01-18            Urine Studies:          RADIOLOGY & ADDITIONAL STUDIES:             [facility] [phone]        Patient seen and examined in bed.  Pleasantly confused but alert and not sleeping         MEDICATIONS  (STANDING):  atorvastatin 80 milliGRAM(s) Oral at bedtime  carbidopa/levodopa  25/100 2.5 Tablet(s) Oral <User Schedule>  carbidopa/levodopa  25/100 2 Tablet(s) Oral <User Schedule>  chlorhexidine 4% Liquid 1 Application(s) Topical <User Schedule>  cholecalciferol 1000 Unit(s) Oral daily  cinacalcet 60 milliGRAM(s) Oral daily  dextrose 40% Gel 15 Gram(s) Oral once  dextrose 5%. 1000 milliLiter(s) (50 mL/Hr) IV Continuous <Continuous>  dextrose 5%. 1000 milliLiter(s) (100 mL/Hr) IV Continuous <Continuous>  dextrose 50% Injectable 25 Gram(s) IV Push once  dextrose 50% Injectable 12.5 Gram(s) IV Push once  dextrose 50% Injectable 25 Gram(s) IV Push once  diVALproex Sprinkle 250 milliGRAM(s) Oral three times a day  DULoxetine 20 milliGRAM(s) Oral daily  folic acid 1 milliGRAM(s) Oral daily  glucagon  Injectable 1 milliGRAM(s) IntraMuscular once  heparin   Injectable 5000 Unit(s) SubCutaneous every 12 hours  insulin lispro (ADMELOG) corrective regimen sliding scale   SubCutaneous three times a day before meals  insulin lispro (ADMELOG) corrective regimen sliding scale   SubCutaneous at bedtime  levothyroxine 25 MICROGram(s) Oral daily  memantine 5 milliGRAM(s) Oral at bedtime  metoprolol tartrate 50 milliGRAM(s) Oral two times a day  midodrine 10 milliGRAM(s) Oral <User Schedule>  mirtazapine 7.5 milliGRAM(s) Oral daily  Nephro-celeste 1 Tablet(s) Oral daily  pantoprazole   Suspension 40 milliGRAM(s) Enteral Tube two times a day  polyethylene glycol 3350 17 Gram(s) Oral two times a day  QUEtiapine 50 milliGRAM(s) Oral at bedtime  senna Syrup 10 milliLiter(s) Oral at bedtime  trihexyphenidyl 2 milliGRAM(s) Oral three times a day      VITAL:  T(C): , Max: 36.8 (01-18-22 @ 21:32)  T(F): , Max: 98.2 (01-18-22 @ 21:32)  HR: 82 (01-19-22 @ 04:42)  BP: 136/73 (01-19-22 @ 04:42)  BP(mean): --  RR: 18 (01-19-22 @ 04:42)  SpO2: 97% (01-19-22 @ 04:42)  Wt(kg): --    I and O's:    01-18 @ 07:01  -  01-19 @ 07:00  --------------------------------------------------------  IN: 660 mL / OUT: 0 mL / NET: 660 mL          PHYSICAL EXAM:    Constitutional: NAD  Neck:  No JVD  Respiratory: CTAB/L  Cardiovascular: S1 and S2  Gastrointestinal: BS+, soft, NT/ND  Extremities: No peripheral edema  Neurological: A/O x 3, no focal deficits  Psychiatric: Normal mood, normal affect  : No Javier  Skin: No rashes  Access: perm cath and avf     LABS:                        7.5    8.62  )-----------( 187      ( 19 Jan 2022 05:51 )             24.2     01-19    129<L>  |  93<L>  |  43<H>  ----------------------------<  144<H>  3.8   |  26  |  2.70<H>    Ca    10.4      19 Jan 2022 05:51  Phos  1.9     01-18  Mg     2.5     01-18            Urine Studies:          RADIOLOGY & ADDITIONAL STUDIES:

## 2022-01-20 NOTE — PROGRESS NOTE ADULT - SUBJECTIVE AND OBJECTIVE BOX
Chief Complaint:  Patient is a 71y old  Male who presents with a chief complaint of leg swelling (2022 11:08)      Date of service 22 @ 11:56      Interval Events:   Patient seen and examined. Last BM . HD today.     Hospital Medications:  acetaminophen     Tablet .. 650 milliGRAM(s) Oral every 6 hours PRN  albuterol/ipratropium for Nebulization 3 milliLiter(s) Nebulizer every 6 hours PRN  atorvastatin 80 milliGRAM(s) Oral at bedtime  carbidopa/levodopa  25/100 2.5 Tablet(s) Oral <User Schedule>  carbidopa/levodopa  25/100 2 Tablet(s) Oral <User Schedule>  chlorhexidine 4% Liquid 1 Application(s) Topical <User Schedule>  cinacalcet 60 milliGRAM(s) Oral daily  dextrose 40% Gel 15 Gram(s) Oral once  dextrose 5%. 1000 milliLiter(s) IV Continuous <Continuous>  dextrose 5%. 1000 milliLiter(s) IV Continuous <Continuous>  dextrose 50% Injectable 25 Gram(s) IV Push once  dextrose 50% Injectable 12.5 Gram(s) IV Push once  dextrose 50% Injectable 25 Gram(s) IV Push once  diVALproex Sprinkle 125 milliGRAM(s) Oral every 8 hours PRN  diVALproex Sprinkle 250 milliGRAM(s) Oral three times a day  DULoxetine 20 milliGRAM(s) Oral daily  folic acid 1 milliGRAM(s) Oral daily  glucagon  Injectable 1 milliGRAM(s) IntraMuscular once  guaiFENesin Oral Liquid (Sugar-Free) 200 milliGRAM(s) Oral every 6 hours PRN  heparin   Injectable 5000 Unit(s) SubCutaneous every 12 hours  insulin lispro (ADMELOG) corrective regimen sliding scale   SubCutaneous four times a day before meals  insulin lispro (ADMELOG) corrective regimen sliding scale   SubCutaneous at bedtime  levothyroxine 25 MICROGram(s) Oral daily  memantine 5 milliGRAM(s) Oral at bedtime  metoprolol tartrate 50 milliGRAM(s) Oral two times a day  midodrine 10 milliGRAM(s) Oral <User Schedule>  mirtazapine 7.5 milliGRAM(s) Oral daily  Nephro-celeste 1 Tablet(s) Oral daily  ondansetron Injectable 4 milliGRAM(s) IV Push once PRN  pantoprazole   Suspension 40 milliGRAM(s) Enteral Tube two times a day  polyethylene glycol 3350 17 Gram(s) Oral two times a day  QUEtiapine 12.5 milliGRAM(s) Oral every 6 hours PRN  QUEtiapine 50 milliGRAM(s) Oral at bedtime  senna Syrup 10 milliLiter(s) Oral at bedtime  sodium chloride 0.9% lock flush 10 milliLiter(s) IV Push every 1 hour PRN  trihexyphenidyl 2 milliGRAM(s) Oral three times a day        Review of Systems:  General:  No wt loss, fevers, chills, night sweats, fatigue,   Eyes:  Good vision, no reported pain  ENT:  No sore throat, pain, runny nose, dysphagia  CV:  No pain, palpitations, hypo/hypertension  Resp:  No dyspnea, cough, tachypnea, wheezing  GI:  See HPI  :  No pain, bleeding, incontinence, nocturia  Muscle:  No pain, weakness  Neuro:  No weakness, tingling, memory problems  Psych:  No fatigue, insomnia, mood problems, depression  Endocrine:  No polyuria, polydipsia, cold/heat intolerance  Heme:  No petechiae, ecchymosis, easy bruisability  Integumentary:  No rash, edema    PHYSICAL EXAM:   Vital Signs:  Vital Signs Last 24 Hrs  T(C): 36.6 (2022 08:17), Max: 36.7 (2022 06:30)  T(F): 97.9 (2022 08:17), Max: 98.1 (2022 06:30)  HR: 80 (2022 08:17) (80 - 87)  BP: 113/62 (2022 08:17) (113/62 - 145/78)  BP(mean): --  RR: 17 (2022 08:17) (17 - 18)  SpO2: 93% (2022 08:17) (93% - 96%)  Daily     Daily Weight in k.3 (2022 08:17)      PHYSICAL EXAM:     GENERAL:  Appears stated age, well-groomed, well-nourished, no distress  HEENT:  NC/AT,  conjunctivae anicteric, clear and pink,   NECK: supple, trachea midline  CHEST:  Full & symmetric excursion, no increased effort, breath sounds clear  HEART:  Regular rhythm, no JVD  ABDOMEN:  Soft, non-tender, non-distended, normoactive bowel sounds,  no masses , no hepatosplenomegaly, +gastrostomy  EXTREMITIES:  no cyanosis,clubbing or edema  SKIN:  No rash, erythema, or, ecchymoses, no jaundice  NEURO:  non-focal, no asterixis  RECTAL: Deferred      LABS Personally reviewed by me:                        7.5    8.62  )-----------( 187      ( 2022 05:51 )             24.2       01-    129<L>  |  93<L>  |  43<H>  ----------------------------<  144<H>  3.8   |  26  |  2.70<H>    Ca    10.4      2022 05:51                                    7.5    8.62  )-----------( 187      ( 2022 05:51 )             24.2                         7.4    8.69  )-----------( 175      ( 2022 06:07 )             24.1       Imaging personally reviewed by me:

## 2022-01-20 NOTE — PROGRESS NOTE ADULT - ASSESSMENT
71M w/ HTN, DM2, CAD-CABG, Parkinson's disease, and advanced CKD, 10/21/21 p/w LE swelling, c/b COVID; now newly ESRD-HD as of this admission    (1)Renal - ESRD-HD TTS   (2)Hyponatremia - will do with higher sodium bath   (3)Hypercalcemia - WIll lower calcium bath   (4)Hypophosphatemia -   (5)CV - tenuous hemodyamics - on Midodrine pre-HD      RECOMMEND  (1)HD today and we were successful in cannulation and he is achieved a blood flow of 200cc/min   (2)Will need to use avf fo 4-5 times before perm cath can be dc'd   (3)Nutritional support     DC to [facility]   [phone]

## 2022-01-20 NOTE — PROGRESS NOTE ADULT - SUBJECTIVE AND OBJECTIVE BOX
SUBJECTIVE / OVERNIGHT EVENTS:      INCOMPLETE NOTE    --------------------------------------------------------------------------------------------  LABS:                        7.5    8.62  )-----------( 187      ( 19 Jan 2022 05:51 )             24.2     01-19    129<L>  |  93<L>  |  43<H>  ----------------------------<  144<H>  3.8   |  26  |  2.70<H>    Ca    10.4      19 Jan 2022 05:51        CAPILLARY BLOOD GLUCOSE      POCT Blood Glucose.: 135 mg/dL (20 Jan 2022 06:29)  POCT Blood Glucose.: 207 mg/dL (20 Jan 2022 00:20)  POCT Blood Glucose.: 205 mg/dL (19 Jan 2022 17:36)            RADIOLOGY & ADDITIONAL TESTS:    Imaging Personally Reviewed:  [x] YES  [ ] NO    Consultant(s) Notes Reviewed:  [x] YES  [ ] NO    MEDICATIONS  (STANDING):  atorvastatin 80 milliGRAM(s) Oral at bedtime  carbidopa/levodopa  25/100 2.5 Tablet(s) Oral <User Schedule>  carbidopa/levodopa  25/100 2 Tablet(s) Oral <User Schedule>  chlorhexidine 4% Liquid 1 Application(s) Topical <User Schedule>  cinacalcet 60 milliGRAM(s) Oral daily  dextrose 40% Gel 15 Gram(s) Oral once  dextrose 5%. 1000 milliLiter(s) (50 mL/Hr) IV Continuous <Continuous>  dextrose 5%. 1000 milliLiter(s) (100 mL/Hr) IV Continuous <Continuous>  dextrose 50% Injectable 25 Gram(s) IV Push once  dextrose 50% Injectable 12.5 Gram(s) IV Push once  dextrose 50% Injectable 25 Gram(s) IV Push once  diVALproex Sprinkle 250 milliGRAM(s) Oral three times a day  DULoxetine 20 milliGRAM(s) Oral daily  folic acid 1 milliGRAM(s) Oral daily  glucagon  Injectable 1 milliGRAM(s) IntraMuscular once  heparin   Injectable 5000 Unit(s) SubCutaneous every 12 hours  insulin lispro (ADMELOG) corrective regimen sliding scale   SubCutaneous at bedtime  insulin lispro (ADMELOG) corrective regimen sliding scale   SubCutaneous four times a day before meals  levothyroxine 25 MICROGram(s) Oral daily  memantine 5 milliGRAM(s) Oral at bedtime  metoprolol tartrate 50 milliGRAM(s) Oral two times a day  midodrine 10 milliGRAM(s) Oral <User Schedule>  mirtazapine 7.5 milliGRAM(s) Oral daily  Nephro-celeste 1 Tablet(s) Oral daily  pantoprazole   Suspension 40 milliGRAM(s) Enteral Tube two times a day  polyethylene glycol 3350 17 Gram(s) Oral two times a day  QUEtiapine 50 milliGRAM(s) Oral at bedtime  senna Syrup 10 milliLiter(s) Oral at bedtime  trihexyphenidyl 2 milliGRAM(s) Oral three times a day    MEDICATIONS  (PRN):  acetaminophen     Tablet .. 650 milliGRAM(s) Oral every 6 hours PRN Mild Pain (1 - 3)  albuterol/ipratropium for Nebulization 3 milliLiter(s) Nebulizer every 6 hours PRN Shortness of Breath and/or Wheezing  diVALproex Sprinkle 125 milliGRAM(s) Oral every 8 hours PRN Agitation  guaiFENesin Oral Liquid (Sugar-Free) 200 milliGRAM(s) Oral every 6 hours PRN Cough  ondansetron Injectable 4 milliGRAM(s) IV Push once PRN Nausea and/or Vomiting  QUEtiapine 12.5 milliGRAM(s) Oral every 6 hours PRN agitation  sodium chloride 0.9% lock flush 10 milliLiter(s) IV Push every 1 hour PRN Pre/post blood products, medications, blood draw, and to maintain line patency      Care Discussed with Consultants/Other Providers [x] YES  [ ] NO    Vital Signs Last 24 Hrs  T(C): 36.4 (20 Jan 2022 12:16), Max: 36.7 (20 Jan 2022 06:30)  T(F): 97.5 (20 Jan 2022 12:16), Max: 98.1 (20 Jan 2022 06:30)  HR: 98 (20 Jan 2022 12:16) (80 - 98)  BP: 115/65 (20 Jan 2022 12:16) (113/62 - 145/78)  BP(mean): --  RR: 18 (20 Jan 2022 12:16) (17 - 18)  SpO2: 94% (20 Jan 2022 12:16) (93% - 96%)  I&O's Summary    19 Jan 2022 07:01  -  20 Jan 2022 07:00  --------------------------------------------------------  IN: 990 mL / OUT: 0 mL / NET: 990 mL    20 Jan 2022 07:01  -  20 Jan 2022 12:27  --------------------------------------------------------  IN: 800 mL / OUT: 800 mL / NET: 0 mL      PE:  GENERAL: NAD, AAOx1-2  HEAD:  Atraumatic, Normocephalic  EYES: conjunctiva and sclera clear  NECK:  No JVD  CHEST/LUNG: CTABL, No wheeze, right chest wall permacath  HEART: Regular rate and rhythm; no murmur  ABDOMEN: Soft, Nontender, Nondistended; Bowel sounds present, peg  EXTREMITIES:  2+ Peripheral Pulses, No clubbing, cyanosis, or edema, left arm avf  NEURO: No focal deficits     SUBJECTIVE / OVERNIGHT EVENTS:    HD with AVF today. no acute events.    --------------------------------------------------------------------------------------------  LABS:                        7.5    8.62  )-----------( 187      ( 19 Jan 2022 05:51 )             24.2     01-19    129<L>  |  93<L>  |  43<H>  ----------------------------<  144<H>  3.8   |  26  |  2.70<H>    Ca    10.4      19 Jan 2022 05:51        CAPILLARY BLOOD GLUCOSE      POCT Blood Glucose.: 135 mg/dL (20 Jan 2022 06:29)  POCT Blood Glucose.: 207 mg/dL (20 Jan 2022 00:20)  POCT Blood Glucose.: 205 mg/dL (19 Jan 2022 17:36)            RADIOLOGY & ADDITIONAL TESTS:    Imaging Personally Reviewed:  [x] YES  [ ] NO    Consultant(s) Notes Reviewed:  [x] YES  [ ] NO    MEDICATIONS  (STANDING):  atorvastatin 80 milliGRAM(s) Oral at bedtime  carbidopa/levodopa  25/100 2.5 Tablet(s) Oral <User Schedule>  carbidopa/levodopa  25/100 2 Tablet(s) Oral <User Schedule>  chlorhexidine 4% Liquid 1 Application(s) Topical <User Schedule>  cinacalcet 60 milliGRAM(s) Oral daily  dextrose 40% Gel 15 Gram(s) Oral once  dextrose 5%. 1000 milliLiter(s) (50 mL/Hr) IV Continuous <Continuous>  dextrose 5%. 1000 milliLiter(s) (100 mL/Hr) IV Continuous <Continuous>  dextrose 50% Injectable 25 Gram(s) IV Push once  dextrose 50% Injectable 12.5 Gram(s) IV Push once  dextrose 50% Injectable 25 Gram(s) IV Push once  diVALproex Sprinkle 250 milliGRAM(s) Oral three times a day  DULoxetine 20 milliGRAM(s) Oral daily  folic acid 1 milliGRAM(s) Oral daily  glucagon  Injectable 1 milliGRAM(s) IntraMuscular once  heparin   Injectable 5000 Unit(s) SubCutaneous every 12 hours  insulin lispro (ADMELOG) corrective regimen sliding scale   SubCutaneous at bedtime  insulin lispro (ADMELOG) corrective regimen sliding scale   SubCutaneous four times a day before meals  levothyroxine 25 MICROGram(s) Oral daily  memantine 5 milliGRAM(s) Oral at bedtime  metoprolol tartrate 50 milliGRAM(s) Oral two times a day  midodrine 10 milliGRAM(s) Oral <User Schedule>  mirtazapine 7.5 milliGRAM(s) Oral daily  Nephro-celeste 1 Tablet(s) Oral daily  pantoprazole   Suspension 40 milliGRAM(s) Enteral Tube two times a day  polyethylene glycol 3350 17 Gram(s) Oral two times a day  QUEtiapine 50 milliGRAM(s) Oral at bedtime  senna Syrup 10 milliLiter(s) Oral at bedtime  trihexyphenidyl 2 milliGRAM(s) Oral three times a day    MEDICATIONS  (PRN):  acetaminophen     Tablet .. 650 milliGRAM(s) Oral every 6 hours PRN Mild Pain (1 - 3)  albuterol/ipratropium for Nebulization 3 milliLiter(s) Nebulizer every 6 hours PRN Shortness of Breath and/or Wheezing  diVALproex Sprinkle 125 milliGRAM(s) Oral every 8 hours PRN Agitation  guaiFENesin Oral Liquid (Sugar-Free) 200 milliGRAM(s) Oral every 6 hours PRN Cough  ondansetron Injectable 4 milliGRAM(s) IV Push once PRN Nausea and/or Vomiting  QUEtiapine 12.5 milliGRAM(s) Oral every 6 hours PRN agitation  sodium chloride 0.9% lock flush 10 milliLiter(s) IV Push every 1 hour PRN Pre/post blood products, medications, blood draw, and to maintain line patency      Care Discussed with Consultants/Other Providers [x] YES  [ ] NO    Vital Signs Last 24 Hrs  T(C): 36.4 (20 Jan 2022 12:16), Max: 36.7 (20 Jan 2022 06:30)  T(F): 97.5 (20 Jan 2022 12:16), Max: 98.1 (20 Jan 2022 06:30)  HR: 98 (20 Jan 2022 12:16) (80 - 98)  BP: 115/65 (20 Jan 2022 12:16) (113/62 - 145/78)  BP(mean): --  RR: 18 (20 Jan 2022 12:16) (17 - 18)  SpO2: 94% (20 Jan 2022 12:16) (93% - 96%)  I&O's Summary    19 Jan 2022 07:01  -  20 Jan 2022 07:00  --------------------------------------------------------  IN: 990 mL / OUT: 0 mL / NET: 990 mL    20 Jan 2022 07:01  -  20 Jan 2022 12:27  --------------------------------------------------------  IN: 800 mL / OUT: 800 mL / NET: 0 mL      PE:  GENERAL: NAD, AAOx1-2  HEAD:  Atraumatic, Normocephalic  EYES: conjunctiva and sclera clear  NECK:  No JVD  CHEST/LUNG: CTABL, No wheeze, right chest wall permacath  HEART: Regular rate and rhythm; no murmur  ABDOMEN: Soft, Nontender, Nondistended; Bowel sounds present, peg  EXTREMITIES:  2+ Peripheral Pulses, No clubbing, cyanosis, or edema, left arm avf  NEURO: No focal deficits

## 2022-01-20 NOTE — PROGRESS NOTE ADULT - PROBLEM SELECTOR PLAN 3
Will continue current  synthroid dose. Will continue monitoring and FU.  Suggest to FU outpatient in 4-6 weeks to repeat TFTs.  Discussed plan with patient. 0

## 2022-01-20 NOTE — PROGRESS NOTE ADULT - SUBJECTIVE AND OBJECTIVE BOX
[facility] [phone]        Patient seen and examined in bed.  He was the same and seen on HD           MEDICATIONS  (STANDING):  atorvastatin 80 milliGRAM(s) Oral at bedtime  carbidopa/levodopa  25/100 2.5 Tablet(s) Oral <User Schedule>  carbidopa/levodopa  25/100 2 Tablet(s) Oral <User Schedule>  chlorhexidine 4% Liquid 1 Application(s) Topical <User Schedule>  cinacalcet 60 milliGRAM(s) Oral daily  dextrose 40% Gel 15 Gram(s) Oral once  dextrose 5%. 1000 milliLiter(s) (50 mL/Hr) IV Continuous <Continuous>  dextrose 5%. 1000 milliLiter(s) (100 mL/Hr) IV Continuous <Continuous>  dextrose 50% Injectable 25 Gram(s) IV Push once  dextrose 50% Injectable 12.5 Gram(s) IV Push once  dextrose 50% Injectable 25 Gram(s) IV Push once  diVALproex Sprinkle 250 milliGRAM(s) Oral three times a day  DULoxetine 20 milliGRAM(s) Oral daily  folic acid 1 milliGRAM(s) Oral daily  glucagon  Injectable 1 milliGRAM(s) IntraMuscular once  heparin   Injectable 5000 Unit(s) SubCutaneous every 12 hours  insulin lispro (ADMELOG) corrective regimen sliding scale   SubCutaneous at bedtime  insulin lispro (ADMELOG) corrective regimen sliding scale   SubCutaneous four times a day before meals  levothyroxine 25 MICROGram(s) Oral daily  memantine 5 milliGRAM(s) Oral at bedtime  metoprolol tartrate 50 milliGRAM(s) Oral two times a day  midodrine 10 milliGRAM(s) Oral <User Schedule>  mirtazapine 7.5 milliGRAM(s) Oral daily  Nephro-celeste 1 Tablet(s) Oral daily  pantoprazole   Suspension 40 milliGRAM(s) Enteral Tube two times a day  polyethylene glycol 3350 17 Gram(s) Oral two times a day  QUEtiapine 50 milliGRAM(s) Oral at bedtime  senna Syrup 10 milliLiter(s) Oral at bedtime  trihexyphenidyl 2 milliGRAM(s) Oral three times a day      VITAL:  T(C): , Max: 36.7 (01-20-22 @ 06:30)  T(F): , Max: 98.1 (01-20-22 @ 06:30)  HR: 80 (01-20-22 @ 08:17)  BP: 113/62 (01-20-22 @ 08:17)  BP(mean): --  RR: 17 (01-20-22 @ 08:17)  SpO2: 93% (01-20-22 @ 08:17)  Wt(kg): --    I and O's:    01-19 @ 07:01  -  01-20 @ 07:00  --------------------------------------------------------  IN: 990 mL / OUT: 0 mL / NET: 990 mL          PHYSICAL EXAM:    Constitutional: NAD  Neck:  No JVD  Respiratory: CTAB/L  Cardiovascular: S1 and S2  Gastrointestinal: BS+, soft, NT/ND  Extremities: No peripheral edema  Neurological:   : No Javier  Skin: No rashes  Access: perm cath and avf     LABS:                        7.5    8.62  )-----------( 187      ( 19 Jan 2022 05:51 )             24.2     01-19    129<L>  |  93<L>  |  43<H>  ----------------------------<  144<H>  3.8   |  26  |  2.70<H>    Ca    10.4      19 Jan 2022 05:51            Urine Studies:          RADIOLOGY & ADDITIONAL STUDIES:

## 2022-01-20 NOTE — PROGRESS NOTE ADULT - ASSESSMENT
71 M w volume overload, GI consulted for drop in hgb    1. CHF per cardio    2. ABBY on CKD per renal  -on HD via permacath per renal, midodrine during dialysis  -s/p L AVF, awaiting maturation     3. Drop in hgb, no overt GI Bleed, no bleeding around PEG site  -anemia receiving Retacrit   -continue to trend CBCs  - now has stabilized    4. RP bleed  s/p Abdominal Angiography and Embolization on 10/29/2021 by Interventional radiology of l4and l5 lumbar artery     5. Failure to thrive  -s/p PEG 1/10    -maintain abdominal binder and mittens   -may initiate tube feeds     6. stress duodenal ulcers  -PPI BID    7. Constipation  -resolved, BM 1/18  -nonobstructive gas pattern on x-ray 1/13  -continue Miralax BID  -DC planning pending AVF maturation and eventual removal of permacath    Attending supervision statement: I have personally seen and examined the patient. I fully participated in the care of this patient. I have made amendments to the documentation where necessary, and agree with the history, physical exam, and plan as outlined by the ACP.    [facility]  Gastroenterology and Hepatology  [address] [place], [place]  Office: [phone]  Cell: [phone]

## 2022-01-20 NOTE — PROGRESS NOTE ADULT - ASSESSMENT
72yo M w/ PMHx of CAD (s/p CABG 2019), CKD (unknown stage), DM2, Parkinson's Disease, HTN, depression presents with new onset acute heart failure exacerbation, NSTEMI, started on hep gtt, developed bl RP bleed, acute anemia. sp MICU course for hemorrhagic shock, 10/28 sp IR embolization l4 and l5 lumbar artery. sp permacath and AVF.    # Acute on chronic systolic and diastolic heart failure  # NSTEMI, medical management  # ESRD, new HD  # Atrial flutter  # acute hypoxic resp failure  # hemorrhagic shock, left RP hematoma, acute blood loss anemia sp embolization l4 and l5 lumbar artery 10/28  # lupus anticoagulant +  # hyponatremia  HD via permacath per renal, midodrine during dialysis  sp L AVF - can be used now for HD  12/13 pt pulled out permacath, replaced with new permacath 12/14  hyponatremia- fu renal  vascular eval- AVF can be used for HD, fu renal    # Parkinson's disease   # delirium  cont on depakote and cymbalta  c/w trihexyphenidyl, carbidopa/levodopa   cont seroquel 50mg qhs     # DM2 (diabetes mellitus, type 2)  per endo  hba1c 6.4    # CAD (coronary artery disease)  # HTN  lopressor, atorvastatin  asa on hold    # FTT  sp PEG via endoscopy  nutrition for tube feeds  mittens and abd binder    # surveillance COVID positive 12/28  asymptomatic off isolation  sating well on RA    DVT ppx hsq    DNR/DNI    dc planning- pending AVF use for HD and eventual removal of permacath    please call [facility] @ [phone] for questions or concerns 70yo M w/ PMHx of CAD (s/p CABG 2019), CKD (unknown stage), DM2, Parkinson's Disease, HTN, depression presents with new onset acute heart failure exacerbation, NSTEMI, started on hep gtt, developed bl RP bleed, acute anemia. sp MICU course for hemorrhagic shock, 10/28 sp IR embolization l4 and l5 lumbar artery. sp permacath and AVF.    # Acute on chronic systolic and diastolic heart failure  # NSTEMI  # ESRD, new HD  # Atrial flutter  # acute hypoxic resp failure  # hemorrhagic shock, left RP hematoma, acute blood loss anemia sp embolization l4 and l5 lumbar artery 10/28  # lupus anticoagulant +  # hyponatremia  # Parkinson's disease   # delirium  # CAD (coronary artery disease)  # HTN  # DM2 (diabetes mellitus, type 2)  # FTT sp PEG  HD via permacath per renal, midodrine during dialysis  sp L AVF - can be used now for HD  cont on depakote PRN, seroquel 50mg qhs   namenda, sinemet  cymbalta  remeron  trihexyphenidyl  insulin per endo  lopressor, atorvastatin  tube feeds, abd binder    DVT ppx hsq    DNR/DNI    dc planning  dw renal, can dc with permacath and use AVF for HD, outpt permacath removal    please call [facility] @ [phone] for questions or concerns

## 2022-01-20 NOTE — CHART NOTE - NSCHARTNOTEFT_GEN_A_CORE
Impression: Type 2 diabetes mellitus without complications: E12.3. Plan: Educated on importance of blood glucose control as related to ocular health. No diabetic retinopathy or maculopathy present. Monitor with annual dilation. Medicine NP Episodic Note    Notified by RN of critical results of CBC as follows:   Hbg 6.5 at 05:02    Pt. seen and examined at bedside, A+Ox3, in NAD.  Pt. admits to mild abdominal discomfort, s/p Oxycodone IR x 1 for pain.  Otherwise denies c/o any other discomfort.      Vital Signs Last 24 Hrs  T(C): 36.6 (27 Oct 2021 04:00), Max: 36.9 (26 Oct 2021 15:18)  T(F): 97.9 (27 Oct 2021 04:00), Max: 98.4 (26 Oct 2021 15:18)  HR: 80 (27 Oct 2021 04:00) (62 - 93)  BP: 161/66 (27 Oct 2021 04:00) (74/40 - 163/95)  BP(mean): --  RR: 17 (27 Oct 2021 04:00) (17 - 18)  SpO2: 97% (27 Oct 2021 04:00) (94% - 98%)      Labs:                          6.5    14.28 )-----------( 224      ( 27 Oct 2021 05:02 )             19.1       Radiology:   < from: CT Abdomen and Pelvis No Cont (10.26.21 @ 13:39) >  IMPRESSION: Bilateral ill-defined retroperitoneal hemorrhage.  Moderate left and small right pleural effusions.  Right-sided ground glass opacities of uncertain etiology.  --- End of Report ---      Physical Exam:  Gen/Neuro: Neurology: A&Ox3, in NAD  CV: RRR, S1S2  Abd: Soft, + tenderness  MSK: No edema, + peripheral pulses    # Anemia in setting of RP bleed  Pt. s/p 2-1/2 units PRBC overnight w/ Hgb now 6.5.  Pt. hemodynamically stable   > Ordered for 2-1/2 units of PRBC   > F/u post transfusion CBC  > Keep hgb > 7  > Monitor vital signs   > Comfort measures     Will endorse to primary team in am.  Attending to follow.    Daysi Campos, [facility]  [phone]

## 2022-01-20 NOTE — PROGRESS NOTE ADULT - ASSESSMENT
Assessment  DMT2: 71y Male with DM T2 with hyperglycemia, A1C 6.4%, was on insulin at home, on insulin coverage, blood sugars are improving no hypoglycemic events.  Hypothyroidism: Patient has no history thyroid disease, was not on any thyroid supplements, subclinical with low-normal FT4, lethargic, started on synthroid 25 mcg po daily, repeat FT4 trending down, NPO switched to IV synhroid 25 mcg po daily  Hypercalcemia: Started on Sensipar 60mg daily, Ca fluctuating/remains elevated..  CHF: on medications, stable, monitored.  HTN: Controlled,  on antihypertensive medications.  Parkinsons: on meds, monitored.  CKD: Monitor labs/BMP      Dr Ayers  Cell : [phone]

## 2022-01-20 NOTE — PROGRESS NOTE ADULT - SUBJECTIVE AND OBJECTIVE BOX
Chief complaint    Patient is a 71y old  Male who presents with a chief complaint of leg swelling (20 Jan 2022 10:17)   Review of systems  Patient in bed, appears comfortable.    Labs and Fingersticks  CAPILLARY BLOOD GLUCOSE      POCT Blood Glucose.: 135 mg/dL (20 Jan 2022 06:29)  POCT Blood Glucose.: 207 mg/dL (20 Jan 2022 00:20)  POCT Blood Glucose.: 205 mg/dL (19 Jan 2022 17:36)  POCT Blood Glucose.: 139 mg/dL (19 Jan 2022 12:09)      Anion Gap, Serum: 10 (01-19 @ 05:51)      Calcium, Total Serum: 10.4 (01-19 @ 05:51)          01-19    129<L>  |  93<L>  |  43<H>  ----------------------------<  144<H>  3.8   |  26  |  2.70<H>    Ca    10.4      19 Jan 2022 05:51                          7.5    8.62  )-----------( 187      ( 19 Jan 2022 05:51 )             24.2     Medications  MEDICATIONS  (STANDING):  atorvastatin 80 milliGRAM(s) Oral at bedtime  carbidopa/levodopa  25/100 2.5 Tablet(s) Oral <User Schedule>  carbidopa/levodopa  25/100 2 Tablet(s) Oral <User Schedule>  chlorhexidine 4% Liquid 1 Application(s) Topical <User Schedule>  cinacalcet 60 milliGRAM(s) Oral daily  dextrose 40% Gel 15 Gram(s) Oral once  dextrose 5%. 1000 milliLiter(s) (50 mL/Hr) IV Continuous <Continuous>  dextrose 5%. 1000 milliLiter(s) (100 mL/Hr) IV Continuous <Continuous>  dextrose 50% Injectable 25 Gram(s) IV Push once  dextrose 50% Injectable 12.5 Gram(s) IV Push once  dextrose 50% Injectable 25 Gram(s) IV Push once  diVALproex Sprinkle 250 milliGRAM(s) Oral three times a day  DULoxetine 20 milliGRAM(s) Oral daily  folic acid 1 milliGRAM(s) Oral daily  glucagon  Injectable 1 milliGRAM(s) IntraMuscular once  heparin   Injectable 5000 Unit(s) SubCutaneous every 12 hours  insulin lispro (ADMELOG) corrective regimen sliding scale   SubCutaneous at bedtime  insulin lispro (ADMELOG) corrective regimen sliding scale   SubCutaneous four times a day before meals  levothyroxine 25 MICROGram(s) Oral daily  memantine 5 milliGRAM(s) Oral at bedtime  metoprolol tartrate 50 milliGRAM(s) Oral two times a day  midodrine 10 milliGRAM(s) Oral <User Schedule>  mirtazapine 7.5 milliGRAM(s) Oral daily  Nephro-celeste 1 Tablet(s) Oral daily  pantoprazole   Suspension 40 milliGRAM(s) Enteral Tube two times a day  polyethylene glycol 3350 17 Gram(s) Oral two times a day  QUEtiapine 50 milliGRAM(s) Oral at bedtime  senna Syrup 10 milliLiter(s) Oral at bedtime  trihexyphenidyl 2 milliGRAM(s) Oral three times a day      Physical Exam  General: Patient comfortable in bed  Vital Signs Last 12 Hrs  T(F): 97.9 (01-20-22 @ 08:17), Max: 98.1 (01-20-22 @ 06:30)  HR: 80 (01-20-22 @ 08:17) (80 - 87)  BP: 113/62 (01-20-22 @ 08:17) (113/62 - 131/71)  BP(mean): --  RR: 17 (01-20-22 @ 08:17) (17 - 18)  SpO2: 93% (01-20-22 @ 08:17) (93% - 95%)  Neck: No palpable thyroid nodules.  CVS: S1S2, No murmurs  Respiratory: No wheezing, no crepitations  GI: Abdomen soft, bowel sounds positive  Musculoskeletal:  edema lower extremities.     Diagnostics    Free Thyroxine, Serum: AM Sched. Collection: 19-Jan-2022 06:00 (01-18 @ 15:42)  Cortisol AM, Serum: AM Sched. Collection: 15-Paul-2022 06:00 (01-14 @ 08:01)  Free Thyroxine, Serum: AM Sched. Collection: 15-Paul-2022 06:00 (01-14 @ 08:01)  Thyroid Stimulating Hormone, Serum: AM Sched. Collection: 15-Paul-2022 06:00 (01-14 @ 08:01)

## 2022-01-21 NOTE — CHART NOTE - NSCHARTNOTEFT_GEN_A_CORE
H&H 6.5/22. Repeat CBC and type and screen ordered. VSS. Will continue to monitor.    Thiago Horowitz  [id number]

## 2022-01-21 NOTE — PROGRESS NOTE ADULT - SUBJECTIVE AND OBJECTIVE BOX
Chief Complaint:  Patient is a 71y old  Male who presents with a chief complaint of leg swelling (2022 12:27)      Date of service 22 @ 10:25      Interval Events:   Patient seen and examined. Brown BM last night. Hgb 6.8, rpt 7.2.     Hospital Medications:  acetaminophen     Tablet .. 650 milliGRAM(s) Oral every 6 hours PRN  albuterol/ipratropium for Nebulization 3 milliLiter(s) Nebulizer every 6 hours PRN  atorvastatin 80 milliGRAM(s) Oral at bedtime  carbidopa/levodopa  25/100 2.5 Tablet(s) Oral <User Schedule>  carbidopa/levodopa  25/100 2 Tablet(s) Oral <User Schedule>  chlorhexidine 4% Liquid 1 Application(s) Topical <User Schedule>  cinacalcet 60 milliGRAM(s) Oral daily  dextrose 40% Gel 15 Gram(s) Oral once  dextrose 5%. 1000 milliLiter(s) IV Continuous <Continuous>  dextrose 5%. 1000 milliLiter(s) IV Continuous <Continuous>  dextrose 50% Injectable 25 Gram(s) IV Push once  dextrose 50% Injectable 12.5 Gram(s) IV Push once  dextrose 50% Injectable 25 Gram(s) IV Push once  diVALproex Sprinkle 125 milliGRAM(s) Oral every 8 hours PRN  diVALproex Sprinkle 250 milliGRAM(s) Oral three times a day  DULoxetine 20 milliGRAM(s) Oral daily  folic acid 1 milliGRAM(s) Oral daily  glucagon  Injectable 1 milliGRAM(s) IntraMuscular once  guaiFENesin Oral Liquid (Sugar-Free) 200 milliGRAM(s) Oral every 6 hours PRN  heparin   Injectable 5000 Unit(s) SubCutaneous every 12 hours  insulin lispro (ADMELOG) corrective regimen sliding scale   SubCutaneous at bedtime  insulin lispro (ADMELOG) corrective regimen sliding scale   SubCutaneous four times a day before meals  levothyroxine 25 MICROGram(s) Oral daily  memantine 5 milliGRAM(s) Oral at bedtime  metoprolol tartrate 50 milliGRAM(s) Oral two times a day  midodrine 10 milliGRAM(s) Oral <User Schedule>  mirtazapine 7.5 milliGRAM(s) Oral daily  Nephro-celeste 1 Tablet(s) Oral daily  ondansetron Injectable 4 milliGRAM(s) IV Push once PRN  pantoprazole   Suspension 40 milliGRAM(s) Enteral Tube two times a day  polyethylene glycol 3350 17 Gram(s) Oral two times a day  QUEtiapine 50 milliGRAM(s) Oral at bedtime  QUEtiapine 12.5 milliGRAM(s) Oral every 6 hours PRN  senna Syrup 10 milliLiter(s) Oral at bedtime  sodium chloride 0.9% lock flush 10 milliLiter(s) IV Push every 1 hour PRN  trihexyphenidyl 2 milliGRAM(s) Oral three times a day        Review of Systems:  unable to obtain    PHYSICAL EXAM:   Vital Signs:  Vital Signs Last 24 Hrs  T(C): 36.7 (2022 08:59), Max: 36.8 (2022 21:06)  T(F): 98.1 (2022 08:59), Max: 98.3 (2022 21:06)  HR: 87 (2022 08:59) (87 - 106)  BP: 146/77 (2022 08:59) (115/65 - 146/77)  BP(mean): --  RR: 12 (2022 08:59) (12 - 18)  SpO2: 98% (2022 08:59) (92% - 98%)  Daily     Daily Weight in k.3 (2022 12:16)      PHYSICAL EXAM:     GENERAL:  Appears stated age, well-groomed, well-nourished, no distress  HEENT:  NC/AT,  conjunctivae anicteric, clear and pink,   NECK: supple, trachea midline  CHEST:  Full & symmetric excursion, no increased effort, breath sounds clear  HEART:  Regular rhythm, no JVD  ABDOMEN:  Soft, non-tender, non-distended, normoactive bowel sounds,  no masses , no hepatosplenomegaly  EXTREMITIES:  no cyanosis,clubbing or edema  SKIN:  No rash, erythema, or, ecchymoses, no jaundice  NEURO:  non-focal, no asterixis  RECTAL: Deferred      LABS Personally reviewed by me:                        7.2    9.65  )-----------( 227      ( 2022 08:46 )             22.9     Mean Cell Volume: 106.0 fl (01-21-22 @ 08:46)        126<L>  |  90<L>  |  49<H>  ----------------------------<  205<H>  3.7   |  23  |  2.81<H>    Ca    9.5      2022 06:04                                    7.2    9.65  )-----------( 227      ( 2022 08:46 )             22.9                         6.8    10.33 )-----------( 217      ( 2022 06:06 )             22.0                         7.5    8.62  )-----------( 187      ( 2022 05:51 )             24.2       Imaging personally reviewed by me:

## 2022-01-21 NOTE — PROGRESS NOTE ADULT - SUBJECTIVE AND OBJECTIVE BOX
Chief complaint  Patient is a 71y old  Male who presents with a chief complaint of leg swelling (21 Jan 2022 11:14)   Review of systems  Patient in bed, looks comfortable, no hypoglycemic episodes.    Labs and Fingersticks  CAPILLARY BLOOD GLUCOSE      POCT Blood Glucose.: 192 mg/dL (21 Jan 2022 11:40)  POCT Blood Glucose.: 166 mg/dL (21 Jan 2022 07:59)  POCT Blood Glucose.: 156 mg/dL (20 Jan 2022 22:39)  POCT Blood Glucose.: 167 mg/dL (20 Jan 2022 21:13)  POCT Blood Glucose.: 195 mg/dL (20 Jan 2022 16:18)      Anion Gap, Serum: 13 (01-21 @ 06:04)      Calcium, Total Serum: 9.5 (01-21 @ 06:04)          01-21    126<L>  |  90<L>  |  49<H>  ----------------------------<  205<H>  3.7   |  23  |  2.81<H>    Ca    9.5      21 Jan 2022 06:04                          7.2    9.65  )-----------( 227      ( 21 Jan 2022 08:46 )             22.9     Medications  MEDICATIONS  (STANDING):  atorvastatin 80 milliGRAM(s) Oral at bedtime  carbidopa/levodopa  25/100 2.5 Tablet(s) Oral <User Schedule>  carbidopa/levodopa  25/100 2 Tablet(s) Oral <User Schedule>  chlorhexidine 4% Liquid 1 Application(s) Topical <User Schedule>  cinacalcet 60 milliGRAM(s) Oral daily  dextrose 40% Gel 15 Gram(s) Oral once  dextrose 5%. 1000 milliLiter(s) (50 mL/Hr) IV Continuous <Continuous>  dextrose 5%. 1000 milliLiter(s) (100 mL/Hr) IV Continuous <Continuous>  dextrose 50% Injectable 25 Gram(s) IV Push once  dextrose 50% Injectable 12.5 Gram(s) IV Push once  dextrose 50% Injectable 25 Gram(s) IV Push once  diVALproex Sprinkle 250 milliGRAM(s) Oral three times a day  DULoxetine 20 milliGRAM(s) Oral daily  folic acid 1 milliGRAM(s) Oral daily  glucagon  Injectable 1 milliGRAM(s) IntraMuscular once  heparin   Injectable 5000 Unit(s) SubCutaneous every 12 hours  insulin glargine Injectable (LANTUS) 6 Unit(s) SubCutaneous at bedtime  insulin lispro (ADMELOG) corrective regimen sliding scale   SubCutaneous every 6 hours  levothyroxine 25 MICROGram(s) Oral daily  memantine 5 milliGRAM(s) Oral at bedtime  metoprolol tartrate 50 milliGRAM(s) Oral two times a day  midodrine 10 milliGRAM(s) Oral <User Schedule>  mirtazapine 7.5 milliGRAM(s) Oral daily  Nephro-celeste 1 Tablet(s) Oral daily  pantoprazole   Suspension 40 milliGRAM(s) Enteral Tube two times a day  QUEtiapine 50 milliGRAM(s) Oral at bedtime  trihexyphenidyl 2 milliGRAM(s) Oral three times a day      Physical Exam  General: Patient comfortable in bed  Vital Signs Last 12 Hrs  T(F): 98.1 (01-21-22 @ 08:59), Max: 98.1 (01-21-22 @ 08:59)  HR: 57 (01-21-22 @ 11:25) (57 - 99)  BP: 134/70 (01-21-22 @ 11:25) (133/83 - 146/77)  BP(mean): --  RR: 17 (01-21-22 @ 11:25) (12 - 18)  SpO2: 100% (01-21-22 @ 11:25) (94% - 100%)  Neck: No palpable thyroid nodules.  CVS: S1S2, No murmurs  Respiratory: No wheezing, no crepitations  GI: Abdomen soft, bowel sounds positive  Musculoskeletal:  edema lower extremities.   Skin: No skin rashes, no ecchymosis    Diagnostics             Chief complaint  Patient is a 71y old  Male who presents with a chief complaint of leg swelling (21 Jan 2022 11:14)   Review of systems  Patient in bed, looks comfortable, no hypoglycemic episodes.    Labs and Fingersticks  CAPILLARY BLOOD GLUCOSE      POCT Blood Glucose.: 192 mg/dL (21 Jan 2022 11:40)  POCT Blood Glucose.: 166 mg/dL (21 Jan 2022 07:59)  POCT Blood Glucose.: 156 mg/dL (20 Jan 2022 22:39)  POCT Blood Glucose.: 167 mg/dL (20 Jan 2022 21:13)  POCT Blood Glucose.: 195 mg/dL (20 Jan 2022 16:18)      Anion Gap, Serum: 13 (01-21 @ 06:04)      Calcium, Total Serum: 9.5 (01-21 @ 06:04)          01-21    126<L>  |  90<L>  |  49<H>  ----------------------------<  205<H>  3.7   |  23  |  2.81<H>    Ca    9.5      21 Jan 2022 06:04                          7.2    9.65  )-----------( 227      ( 21 Jan 2022 08:46 )             22.9     Medications  MEDICATIONS  (STANDING):  atorvastatin 80 milliGRAM(s) Oral at bedtime  carbidopa/levodopa  25/100 2.5 Tablet(s) Oral <User Schedule>  carbidopa/levodopa  25/100 2 Tablet(s) Oral <User Schedule>  chlorhexidine 4% Liquid 1 Application(s) Topical <User Schedule>  cinacalcet 60 milliGRAM(s) Oral daily  dextrose 40% Gel 15 Gram(s) Oral once  dextrose 5%. 1000 milliLiter(s) (50 mL/Hr) IV Continuous <Continuous>  dextrose 5%. 1000 milliLiter(s) (100 mL/Hr) IV Continuous <Continuous>  dextrose 50% Injectable 25 Gram(s) IV Push once  dextrose 50% Injectable 12.5 Gram(s) IV Push once  dextrose 50% Injectable 25 Gram(s) IV Push once  diVALproex Sprinkle 250 milliGRAM(s) Oral three times a day  DULoxetine 20 milliGRAM(s) Oral daily  folic acid 1 milliGRAM(s) Oral daily  glucagon  Injectable 1 milliGRAM(s) IntraMuscular once  heparin   Injectable 5000 Unit(s) SubCutaneous every 12 hours  insulin glargine Injectable (LANTUS) 6 Unit(s) SubCutaneous at bedtime  insulin lispro (ADMELOG) corrective regimen sliding scale   SubCutaneous every 6 hours  levothyroxine 25 MICROGram(s) Oral daily  memantine 5 milliGRAM(s) Oral at bedtime  metoprolol tartrate 50 milliGRAM(s) Oral two times a day  midodrine 10 milliGRAM(s) Oral <User Schedule>  mirtazapine 7.5 milliGRAM(s) Oral daily  Nephro-celeste 1 Tablet(s) Oral daily  pantoprazole   Suspension 40 milliGRAM(s) Enteral Tube two times a day  QUEtiapine 50 milliGRAM(s) Oral at bedtime  trihexyphenidyl 2 milliGRAM(s) Oral three times a day      Physical Exam  General: Patient comfortable in bed  Vital Signs Last 12 Hrs  T(F): 98.1 (01-21-22 @ 08:59), Max: 98.1 (01-21-22 @ 08:59)  HR: 57 (01-21-22 @ 11:25) (57 - 99)  BP: 134/70 (01-21-22 @ 11:25) (133/83 - 146/77)  BP(mean): --  RR: 17 (01-21-22 @ 11:25) (12 - 18)  SpO2: 100% (01-21-22 @ 11:25) (94% - 100%)  Neck: No palpable thyroid nodules.  CVS: S1S2, No murmurs  Respiratory: No wheezing, no crepitations  GI: Abdomen soft, bowel sounds positive  Musculoskeletal:  edema lower extremities.   Skin: No skin rashes, no ecchymosis    Diagnostics

## 2022-01-21 NOTE — PROGRESS NOTE ADULT - PROBLEM SELECTOR PLAN 1
Will start Lantus 6u at bedtime and adjust coverage to be q6. Will continue monitoring FS and FU.  Suggest DC on Tradjenta 5mg daily, discontinue prior hypoglycemic agents/insulin, endo FU 4 weeks.

## 2022-01-21 NOTE — PROGRESS NOTE ADULT - ASSESSMENT
Assessment  DMT2: 71y Male with DM T2 with hyperglycemia, A1C 6.4%, was on insulin at home, on insulin coverage, blood sugars are running slightly high and not at target, no hypoglycemic events, on tube feeds, NAD.  Hypothyroidism: Patient has no history thyroid disease, was not on any thyroid supplements, subclinical with low-normal FT4, lethargic, on synthroid 25 mcg po daily.  Hypercalcemia: Started on Sensipar 60mg daily, Ca fluctuating/remains elevated..  CHF: on medications, stable, monitored.  HTN: Controlled,  on antihypertensive medications.  Parkinsons: on meds, monitored.  CKD: Monitor labs/BMP      Dr Ayers  Cell : [phone]   Assessment  DMT2: 71y Male with DM T2 with hyperglycemia, A1C 6.4%, was on insulin at home, on insulin coverage, blood sugars are  running slightly high and not at target, no hypoglycemic events, on tube feeds, NAD.  Hypothyroidism: Patient has no history thyroid disease, was not on any thyroid supplements, subclinical with low-normal FT4, lethargic, on synthroid 25 mcg po daily.  Hypercalcemia: Started on Sensipar 60mg daily, Ca fluctuating/remains elevated..  CHF: on medications, stable, monitored.  HTN: Controlled,  on antihypertensive medications.  Parkinsons: on meds, monitored.  CKD: Monitor labs/BMP      Dr Ayers  Cell : [phone]

## 2022-01-21 NOTE — PROGRESS NOTE ADULT - SUBJECTIVE AND OBJECTIVE BOX
SUBJECTIVE / OVERNIGHT EVENTS:    INCOMPLETE NOTE      --------------------------------------------------------------------------------------------  LABS:                        7.2    9.65  )-----------( 227      ( 21 Jan 2022 08:46 )             22.9     01-21    126<L>  |  90<L>  |  49<H>  ----------------------------<  205<H>  3.7   |  23  |  2.81<H>    Ca    9.5      21 Jan 2022 06:04        CAPILLARY BLOOD GLUCOSE      POCT Blood Glucose.: 166 mg/dL (21 Jan 2022 07:59)  POCT Blood Glucose.: 156 mg/dL (20 Jan 2022 22:39)  POCT Blood Glucose.: 167 mg/dL (20 Jan 2022 21:13)  POCT Blood Glucose.: 195 mg/dL (20 Jan 2022 16:18)  POCT Blood Glucose.: 141 mg/dL (20 Jan 2022 13:24)            RADIOLOGY & ADDITIONAL TESTS:    Imaging Personally Reviewed:  [x] YES  [ ] NO    Consultant(s) Notes Reviewed:  [x] YES  [ ] NO    MEDICATIONS  (STANDING):  atorvastatin 80 milliGRAM(s) Oral at bedtime  carbidopa/levodopa  25/100 2.5 Tablet(s) Oral <User Schedule>  carbidopa/levodopa  25/100 2 Tablet(s) Oral <User Schedule>  chlorhexidine 4% Liquid 1 Application(s) Topical <User Schedule>  cinacalcet 60 milliGRAM(s) Oral daily  dextrose 40% Gel 15 Gram(s) Oral once  dextrose 5%. 1000 milliLiter(s) (50 mL/Hr) IV Continuous <Continuous>  dextrose 5%. 1000 milliLiter(s) (100 mL/Hr) IV Continuous <Continuous>  dextrose 50% Injectable 25 Gram(s) IV Push once  dextrose 50% Injectable 12.5 Gram(s) IV Push once  dextrose 50% Injectable 25 Gram(s) IV Push once  diVALproex Sprinkle 250 milliGRAM(s) Oral three times a day  DULoxetine 20 milliGRAM(s) Oral daily  folic acid 1 milliGRAM(s) Oral daily  glucagon  Injectable 1 milliGRAM(s) IntraMuscular once  heparin   Injectable 5000 Unit(s) SubCutaneous every 12 hours  insulin lispro (ADMELOG) corrective regimen sliding scale   SubCutaneous at bedtime  insulin lispro (ADMELOG) corrective regimen sliding scale   SubCutaneous four times a day before meals  levothyroxine 25 MICROGram(s) Oral daily  memantine 5 milliGRAM(s) Oral at bedtime  metoprolol tartrate 50 milliGRAM(s) Oral two times a day  midodrine 10 milliGRAM(s) Oral <User Schedule>  mirtazapine 7.5 milliGRAM(s) Oral daily  Nephro-celeste 1 Tablet(s) Oral daily  pantoprazole   Suspension 40 milliGRAM(s) Enteral Tube two times a day  polyethylene glycol 3350 17 Gram(s) Oral two times a day  QUEtiapine 50 milliGRAM(s) Oral at bedtime  senna Syrup 10 milliLiter(s) Oral at bedtime  trihexyphenidyl 2 milliGRAM(s) Oral three times a day    MEDICATIONS  (PRN):  acetaminophen     Tablet .. 650 milliGRAM(s) Oral every 6 hours PRN Mild Pain (1 - 3)  albuterol/ipratropium for Nebulization 3 milliLiter(s) Nebulizer every 6 hours PRN Shortness of Breath and/or Wheezing  diVALproex Sprinkle 125 milliGRAM(s) Oral every 8 hours PRN Agitation  guaiFENesin Oral Liquid (Sugar-Free) 200 milliGRAM(s) Oral every 6 hours PRN Cough  ondansetron Injectable 4 milliGRAM(s) IV Push once PRN Nausea and/or Vomiting  QUEtiapine 12.5 milliGRAM(s) Oral every 6 hours PRN agitation  sodium chloride 0.9% lock flush 10 milliLiter(s) IV Push every 1 hour PRN Pre/post blood products, medications, blood draw, and to maintain line patency      Care Discussed with Consultants/Other Providers [x] YES  [ ] NO    Vital Signs Last 24 Hrs  T(C): 36.7 (21 Jan 2022 08:59), Max: 36.8 (20 Jan 2022 21:06)  T(F): 98.1 (21 Jan 2022 08:59), Max: 98.3 (20 Jan 2022 21:06)  HR: 87 (21 Jan 2022 08:59) (87 - 106)  BP: 146/77 (21 Jan 2022 08:59) (115/65 - 146/77)  BP(mean): --  RR: 12 (21 Jan 2022 08:59) (12 - 18)  SpO2: 98% (21 Jan 2022 08:59) (92% - 98%)  I&O's Summary    20 Jan 2022 07:01  -  21 Jan 2022 07:00  --------------------------------------------------------  IN: 800 mL / OUT: 800 mL / NET: 0 mL      PE:  GENERAL: NAD, AAOx1-2  HEAD:  Atraumatic, Normocephalic  EYES: conjunctiva and sclera clear  NECK:  No JVD  CHEST/LUNG: CTABL, No wheeze, right chest wall permacath  HEART: Regular rate and rhythm; no murmur  ABDOMEN: Soft, Nontender, Nondistended; Bowel sounds present, peg  EXTREMITIES:  2+ Peripheral Pulses, No clubbing, cyanosis, or edema, left arm avf  NEURO: No focal deficits     SUBJECTIVE / OVERNIGHT EVENTS:    per staff, agitated earlier, pulling at permacath. now calm sleeping.    --------------------------------------------------------------------------------------------  LABS:                        7.2    9.65  )-----------( 227      ( 21 Jan 2022 08:46 )             22.9     01-21    126<L>  |  90<L>  |  49<H>  ----------------------------<  205<H>  3.7   |  23  |  2.81<H>    Ca    9.5      21 Jan 2022 06:04        CAPILLARY BLOOD GLUCOSE      POCT Blood Glucose.: 166 mg/dL (21 Jan 2022 07:59)  POCT Blood Glucose.: 156 mg/dL (20 Jan 2022 22:39)  POCT Blood Glucose.: 167 mg/dL (20 Jan 2022 21:13)  POCT Blood Glucose.: 195 mg/dL (20 Jan 2022 16:18)  POCT Blood Glucose.: 141 mg/dL (20 Jan 2022 13:24)            RADIOLOGY & ADDITIONAL TESTS:    Imaging Personally Reviewed:  [x] YES  [ ] NO    Consultant(s) Notes Reviewed:  [x] YES  [ ] NO    MEDICATIONS  (STANDING):  atorvastatin 80 milliGRAM(s) Oral at bedtime  carbidopa/levodopa  25/100 2.5 Tablet(s) Oral <User Schedule>  carbidopa/levodopa  25/100 2 Tablet(s) Oral <User Schedule>  chlorhexidine 4% Liquid 1 Application(s) Topical <User Schedule>  cinacalcet 60 milliGRAM(s) Oral daily  dextrose 40% Gel 15 Gram(s) Oral once  dextrose 5%. 1000 milliLiter(s) (50 mL/Hr) IV Continuous <Continuous>  dextrose 5%. 1000 milliLiter(s) (100 mL/Hr) IV Continuous <Continuous>  dextrose 50% Injectable 25 Gram(s) IV Push once  dextrose 50% Injectable 12.5 Gram(s) IV Push once  dextrose 50% Injectable 25 Gram(s) IV Push once  diVALproex Sprinkle 250 milliGRAM(s) Oral three times a day  DULoxetine 20 milliGRAM(s) Oral daily  folic acid 1 milliGRAM(s) Oral daily  glucagon  Injectable 1 milliGRAM(s) IntraMuscular once  heparin   Injectable 5000 Unit(s) SubCutaneous every 12 hours  insulin lispro (ADMELOG) corrective regimen sliding scale   SubCutaneous at bedtime  insulin lispro (ADMELOG) corrective regimen sliding scale   SubCutaneous four times a day before meals  levothyroxine 25 MICROGram(s) Oral daily  memantine 5 milliGRAM(s) Oral at bedtime  metoprolol tartrate 50 milliGRAM(s) Oral two times a day  midodrine 10 milliGRAM(s) Oral <User Schedule>  mirtazapine 7.5 milliGRAM(s) Oral daily  Nephro-celeste 1 Tablet(s) Oral daily  pantoprazole   Suspension 40 milliGRAM(s) Enteral Tube two times a day  polyethylene glycol 3350 17 Gram(s) Oral two times a day  QUEtiapine 50 milliGRAM(s) Oral at bedtime  senna Syrup 10 milliLiter(s) Oral at bedtime  trihexyphenidyl 2 milliGRAM(s) Oral three times a day    MEDICATIONS  (PRN):  acetaminophen     Tablet .. 650 milliGRAM(s) Oral every 6 hours PRN Mild Pain (1 - 3)  albuterol/ipratropium for Nebulization 3 milliLiter(s) Nebulizer every 6 hours PRN Shortness of Breath and/or Wheezing  diVALproex Sprinkle 125 milliGRAM(s) Oral every 8 hours PRN Agitation  guaiFENesin Oral Liquid (Sugar-Free) 200 milliGRAM(s) Oral every 6 hours PRN Cough  ondansetron Injectable 4 milliGRAM(s) IV Push once PRN Nausea and/or Vomiting  QUEtiapine 12.5 milliGRAM(s) Oral every 6 hours PRN agitation  sodium chloride 0.9% lock flush 10 milliLiter(s) IV Push every 1 hour PRN Pre/post blood products, medications, blood draw, and to maintain line patency      Care Discussed with Consultants/Other Providers [x] YES  [ ] NO    Vital Signs Last 24 Hrs  T(C): 36.7 (21 Jan 2022 08:59), Max: 36.8 (20 Jan 2022 21:06)  T(F): 98.1 (21 Jan 2022 08:59), Max: 98.3 (20 Jan 2022 21:06)  HR: 87 (21 Jan 2022 08:59) (87 - 106)  BP: 146/77 (21 Jan 2022 08:59) (115/65 - 146/77)  BP(mean): --  RR: 12 (21 Jan 2022 08:59) (12 - 18)  SpO2: 98% (21 Jan 2022 08:59) (92% - 98%)  I&O's Summary    20 Jan 2022 07:01  -  21 Jan 2022 07:00  --------------------------------------------------------  IN: 800 mL / OUT: 800 mL / NET: 0 mL      PE:  GENERAL: NAD, sleeping  HEAD:  Atraumatic, Normocephalic  EYES: conjunctiva and sclera clear  NECK:  No JVD  CHEST/LUNG: CTABL, No wheeze, right chest wall permacath  HEART: Regular rate and rhythm; no murmur  ABDOMEN: Soft, Nontender, Nondistended; Bowel sounds present, peg  EXTREMITIES:  2+ Peripheral Pulses, No clubbing, cyanosis, or edema, left arm avf

## 2022-01-21 NOTE — PROGRESS NOTE ADULT - ASSESSMENT
History     Chief Complaint   Patient presents with     Cough     The history is provided by the mother and the father.     This is a 17 month old male, basically healthy, presenting with cough. Mom notes patient has had cold symptoms for about a week. He has steadily had increased cough and now has green discharge from his nose. He has felt warm for the last 2 mornings and is febrile in the ED. Prior to this he has been active, eating and drinking well, normal wet diapers. He had one diarrhea yesterday. He did get his flu shot. He is not in . Other immunizations up to date. He is exposed to secondhand smoke passively but parents state they smoke outside. His last dose of ibuprofen was last night.  Patient has had pneumonia in the past and mom is concerned for this.    I have reviewed the Medications, Allergies, Past Medical and Surgical History, and Social History in the Epic system.    Review of Systems   All other ROS reviewed and are negative or non-contributory except as stated in HPI.     Physical Exam   Pulse: 142 (pt crying)  Temp: 101.9  F (38.8  C)  Resp: (!) 36  Weight: 10.9 kg (24 lb 1.6 oz)  SpO2: 98 %  Physical Exam   Constitutional: He appears well-developed and well-nourished. He is active. No distress.   Patient initially running around in the room, active and happy. He cries with exam but is easily comforted.   HENT:   Right Ear: Tympanic membrane normal.   Left Ear: Tympanic membrane normal.   Mouth/Throat: Oropharynx is clear.   Clear rhinorrhea, congested   Eyes: Conjunctivae and EOM are normal.   Neck: Normal range of motion.   Cardiovascular: Regular rhythm.  Tachycardia present.    Pulmonary/Chest:   Rhonchi noted. No wheezing. Some difficulty examining breath sounds as patient cries with most exam.   Abdominal: Soft.   Musculoskeletal: Normal range of motion.   Neurological: He is alert. He exhibits normal muscle tone.   Skin: No rash noted.   Febrile   Vitals reviewed.      ED  Course (with Medical Decision Making)    Pt seen and examined by me.  RN and EPIC notes reviewed.      Young child with febrile illness with runny nose and cough. Likely viral. Possible pneumonia. He has been worsening through the week. I'm going to give him ibuprofen in the ED. Plan chest x-ray.     Chest x-ray is clear. Plan to treat this as a viral URI with cough. Offer lots of fluids. Ibuprofen also he was Tylenol as needed for fever. Follow up in clinic if not improving through the week and return for worsening, changes or concerns.      Results for orders placed or performed during the hospital encounter of 02/12/17   XR Chest 2 Views    Narrative    XR CHEST 2 VW  2/12/2017 9:35 AM    HISTORY:  fever; cough    COMPARISON:  12/18/2016      Impression    IMPRESSION:  Negative.     RODOLFO SOLOMON MD      Procedures      Assessments & Plan     I have reviewed the findings, diagnosis, plan and need for follow up with the patient's family.    Discharge Medication List as of 2/12/2017 10:32 AM          Final diagnoses:   Viral URI with cough     Disposition: Patient discharged home in the care of his family in stable condition. Plan as above. Return for concerns.    Note: Chart documentation done in part with Dragon Voice Recognition software. Although reviewed after completion, some word and grammatical errors may remain.     2/12/2017   [facility] EMERGENCY DEPARTMENT     Brandy Lindsey MD  02/12/17 [id number]     72yo M w/ PMHx of CAD (s/p CABG 2019), CKD (unknown stage), DM2, Parkinson's Disease, HTN, depression presents with new onset acute heart failure exacerbation, NSTEMI, started on hep gtt, developed bl RP bleed, acute anemia. sp MICU course for hemorrhagic shock, 10/28 sp IR embolization l4 and l5 lumbar artery. sp permacath and AVF.    # Acute on chronic systolic and diastolic heart failure  # NSTEMI  # ESRD, new HD  # Atrial flutter  # acute hypoxic resp failure  # hemorrhagic shock, left RP hematoma, acute blood loss anemia sp embolization l4 and l5 lumbar artery 10/28  # lupus anticoagulant +  # hyponatremia  # Parkinson's disease   # delirium  # CAD (coronary artery disease)  # HTN  # DM2 (diabetes mellitus, type 2)  # FTT sp PEG  HD via permacath per renal, midodrine during dialysis  sp L AVF - can be used now for HD  cont on depakote PRN, seroquel 50mg qhs   namenda, sinemet  cymbalta  remeron  trihexyphenidyl  insulin per endo  lopressor, atorvastatin  tube feeds, abd binder    DVT ppx hsq    DNR/DNI    dc planning  dw renal, can dc with permacath and use AVF for HD, outpt permacath removal    please call [facility] @ [phone] for questions or concerns 72yo M w/ PMHx of CAD (s/p CABG 2019), CKD (unknown stage), DM2, Parkinson's Disease, HTN, depression presents with new onset acute heart failure exacerbation, NSTEMI, started on hep gtt, developed bl RP bleed, acute anemia. sp MICU course for hemorrhagic shock, 10/28 sp IR embolization l4 and l5 lumbar artery. sp permacath and AVF.    # Acute on chronic systolic and diastolic heart failure  # NSTEMI  # ESRD, new HD  # Atrial flutter  # acute hypoxic resp failure  # hemorrhagic shock, left RP hematoma, acute blood loss anemia sp embolization l4 and l5 lumbar artery 10/28  # lupus anticoagulant +  # hyponatremia  # Parkinson's disease   # delirium  # CAD (coronary artery disease)  # HTN  # DM2 (diabetes mellitus, type 2)  # FTT sp PEG  # anemia chronic dz  HD via permacath per renal, midodrine during dialysis  sp L AVF - can be used now for HD  cont on depakote PRN, seroquel 50mg qhs   namenda, sinemet  cymbalta  remeron  trihexyphenidyl  insulin per endo  lopressor, atorvastatin  tube feeds, abd binder  transfuse <7, EPO with dialysis    DVT ppx hsq    DNR/DNI    dc planning  dw renal, can dc with permacath and use AVF for HD, outpt permacath removal    please call [facility] @ [phone] for questions or concerns

## 2022-01-21 NOTE — PROGRESS NOTE ADULT - ASSESSMENT
71 M w volume overload, GI consulted for drop in hgb    1. CHF per cardio    2. ABBY on CKD per renal  -on HD via permacath per renal, midodrine during dialysis  -s/p L AVF, awaiting maturation     3. Drop in hgb, no overt GI Bleed, no bleeding around PEG site  -anemia receiving Retacrit   -continue to trend CBCs  -hgb 6.8, rpt 7.2     4. RP bleed  s/p Abdominal Angiography and Embolization on 10/29/2021 by Interventional radiology of l4and l5 lumbar artery     5. Failure to thrive  -s/p PEG 1/10    -maintain abdominal binder and mittens   -may initiate tube feeds     6. stress duodenal ulcers  -PPI BID    7. Constipation  -resolved, brown BM 1/20  -nonobstructive gas pattern on x-ray 1/13  -continue Miralax BID  -DC planning pending AVF maturation and eventual removal of permacath    Attending supervision statement: I have personally seen and examined the patient. I fully participated in the care of this patient. I have made amendments to the documentation where necessary, and agree with the history, physical exam, and plan as outlined by the ACP.    [facility]  Gastroenterology and Hepatology  [address] [place], [place]  Office: [phone]  Cell: [phone]

## 2022-01-21 NOTE — PROGRESS NOTE ADULT - SUBJECTIVE AND OBJECTIVE BOX
[facility] [phone]        Patient seen and examined in bed.   He was the same         MEDICATIONS  (STANDING):  atorvastatin 80 milliGRAM(s) Oral at bedtime  carbidopa/levodopa  25/100 2.5 Tablet(s) Oral <User Schedule>  carbidopa/levodopa  25/100 2 Tablet(s) Oral <User Schedule>  chlorhexidine 4% Liquid 1 Application(s) Topical <User Schedule>  cinacalcet 60 milliGRAM(s) Oral daily  dextrose 40% Gel 15 Gram(s) Oral once  dextrose 5%. 1000 milliLiter(s) (50 mL/Hr) IV Continuous <Continuous>  dextrose 5%. 1000 milliLiter(s) (100 mL/Hr) IV Continuous <Continuous>  dextrose 50% Injectable 25 Gram(s) IV Push once  dextrose 50% Injectable 12.5 Gram(s) IV Push once  dextrose 50% Injectable 25 Gram(s) IV Push once  diVALproex Sprinkle 250 milliGRAM(s) Oral three times a day  DULoxetine 20 milliGRAM(s) Oral daily  folic acid 1 milliGRAM(s) Oral daily  glucagon  Injectable 1 milliGRAM(s) IntraMuscular once  heparin   Injectable 5000 Unit(s) SubCutaneous every 12 hours  insulin lispro (ADMELOG) corrective regimen sliding scale   SubCutaneous at bedtime  insulin lispro (ADMELOG) corrective regimen sliding scale   SubCutaneous four times a day before meals  levothyroxine 25 MICROGram(s) Oral daily  memantine 5 milliGRAM(s) Oral at bedtime  metoprolol tartrate 50 milliGRAM(s) Oral two times a day  midodrine 10 milliGRAM(s) Oral <User Schedule>  mirtazapine 7.5 milliGRAM(s) Oral daily  Nephro-celeste 1 Tablet(s) Oral daily  pantoprazole   Suspension 40 milliGRAM(s) Enteral Tube two times a day  polyethylene glycol 3350 17 Gram(s) Oral two times a day  QUEtiapine 50 milliGRAM(s) Oral at bedtime  senna Syrup 10 milliLiter(s) Oral at bedtime  trihexyphenidyl 2 milliGRAM(s) Oral three times a day      VITAL:  T(C): , Max: 36.8 (01-20-22 @ 21:06)  T(F): , Max: 98.3 (01-20-22 @ 21:06)  HR: 87 (01-21-22 @ 08:59)  BP: 146/77 (01-21-22 @ 08:59)  BP(mean): --  RR: 12 (01-21-22 @ 08:59)  SpO2: 98% (01-21-22 @ 08:59)  Wt(kg): --    I and O's:    01-20 @ 07:01  -  01-21 @ 07:00  --------------------------------------------------------  IN: 800 mL / OUT: 800 mL / NET: 0 mL          PHYSICAL EXAM:    Constitutional: NAD  Neck:  No JVD  Respiratory: CTAB/L  Cardiovascular: S1 and S2  Gastrointestinal: BS+, soft, NT/ND  Extremities: No peripheral edema  Neurological:    Psychiatric: Normal mood, normal affect  : No Javier  Skin: No rashes  Access: avf and perm cath     LABS:                        7.2    9.65  )-----------( 227      ( 21 Jan 2022 08:46 )             22.9     01-21    126<L>  |  90<L>  |  49<H>  ----------------------------<  205<H>  3.7   |  23  |  2.81<H>    Ca    9.5      21 Jan 2022 06:04            Urine Studies:          RADIOLOGY & ADDITIONAL STUDIES:

## 2022-01-21 NOTE — PROGRESS NOTE ADULT - ASSESSMENT
71M w/ HTN, DM2, CAD-CABG, Parkinson's disease, and advanced CKD, 10/21/21 p/w LE swelling, c/b COVID; now newly ESRD-HD as of this admission    (1)Renal - ESRD-HD TTS   (2)Hyponatremia - will do with higher sodium bath   (3)Hypercalcemia - WIll lower calcium bath   (4)Hypophosphatemia -   (5)CV - tenuous hemodyamics - on Midodrine pre-HD      RECOMMEND  (1)HD in am  and we were successful in cannulation and he is achieved a blood flow of 200cc/min.  Aim cornelia is 250cc blood flow ;  Higher sodium bath in am   (2)Will need to use avf fo 4-5 times before perm cath can be dc'd   (3)Nutritional support     DC to [facility]   [phone]

## 2022-01-22 NOTE — PROGRESS NOTE ADULT - SUBJECTIVE AND OBJECTIVE BOX
Patient is a 71y Male     Patient is a 71y old  Male who presents with a chief complaint of leg swelling (22 Jan 2022 12:07)      HPI:  70yo M w/ PMHx of CAD (s/p CABG 2019), CKD (unknown stage), DM2, Parkinson's Disease, HTN, depression presents with bilateral leg swelling, patient's daughter in-law at bedside Yoly Quintero ([facility]) provides the history, the daughter reports the patient is a poor historian, unable to remember having conversations with others and likely cannot weigh risk/benefits of medical conditions, she reports that he has had bilateral lower extremity swelling for the past few months, but over the past 2 weeks it has significantly worsened, he has further difficulty ambulating due to swelling, his outpatient provider attempted to manage with 20mg lasix and then increased to 40mg lasix but the patient never took the increased dose, his symptoms ar persistent throughout the day and are extremely severe, he has developed wounds of the legs with weeping of fluid due to the swelling, she reports that the patient is frequently non-compliant with medications and medical management, he lives at home with his son and daughter in law, he denies chest pain, shortness of breath, fever/chills, cough, sputum, in the ED, he was tachycardic but hemodynamically stable, afebrile, saturating well on room air, labs were significant for elevated BNP, elevated creatinine, imaging showed moderate left pleural effusion, patient was admitted to general medicine for further management  (21 Oct 2021 07:06)      PAST MEDICAL & SURGICAL HISTORY:  Hypertension    Diabetes Mellitus, Type 2    Hypercholesterolemia    Parkinson disease    CAD (coronary artery disease)  s/p 1 stent in 2010 and CABG 2019    S/P CABG (coronary artery bypass graft)  2019    S/P hip replacement, right  2016    Anxiety    Depression, major        MEDICATIONS  (STANDING):  atorvastatin 80 milliGRAM(s) Oral at bedtime  carbidopa/levodopa  25/100 2.5 Tablet(s) Oral <User Schedule>  carbidopa/levodopa  25/100 2 Tablet(s) Oral <User Schedule>  chlorhexidine 4% Liquid 1 Application(s) Topical <User Schedule>  cinacalcet 60 milliGRAM(s) Oral daily  dextrose 40% Gel 15 Gram(s) Oral once  dextrose 5%. 1000 milliLiter(s) (50 mL/Hr) IV Continuous <Continuous>  dextrose 5%. 1000 milliLiter(s) (100 mL/Hr) IV Continuous <Continuous>  dextrose 50% Injectable 25 Gram(s) IV Push once  dextrose 50% Injectable 12.5 Gram(s) IV Push once  dextrose 50% Injectable 25 Gram(s) IV Push once  diVALproex Sprinkle 250 milliGRAM(s) Oral three times a day  DULoxetine 20 milliGRAM(s) Oral daily  folic acid 1 milliGRAM(s) Oral daily  glucagon  Injectable 1 milliGRAM(s) IntraMuscular once  heparin   Injectable 5000 Unit(s) SubCutaneous every 12 hours  insulin glargine Injectable (LANTUS) 6 Unit(s) SubCutaneous at bedtime  insulin lispro (ADMELOG) corrective regimen sliding scale   SubCutaneous every 6 hours  levothyroxine 25 MICROGram(s) Oral daily  memantine 5 milliGRAM(s) Oral at bedtime  metoprolol tartrate 50 milliGRAM(s) Oral two times a day  midodrine 10 milliGRAM(s) Oral <User Schedule>  mirtazapine 7.5 milliGRAM(s) Oral daily  Nephro-celeste 1 Tablet(s) Oral daily  pantoprazole   Suspension 40 milliGRAM(s) Enteral Tube two times a day  QUEtiapine 50 milliGRAM(s) Oral at bedtime  trihexyphenidyl 2 milliGRAM(s) Oral three times a day      Allergies    adhesives (Rash)  latex (Urticaria)  No Known Drug Allergies    Intolerances        SOCIAL HISTORY:  Denies ETOh,Smoking,     FAMILY HISTORY:  Family history of hypertension    Family history of malaria    Family history of pulmonary fibrosis (Sibling)        REVIEW OF SYSTEMS:    CONSTITUTIONAL: No weakness, fevers or chills  EYES/ENT: No visual changes;  No vertigo or throat pain   NECK: No pain or stiffness  RESPIRATORY: No cough, wheezing, hemoptysis; No shortness of breath  CARDIOVASCULAR: No chest pain or palpitations  GASTROINTESTINAL: No abdominal or epigastric pain. No nausea, vomiting, or hematemesis; No diarrhea or constipation. No melena or hematochezia.  GENITOURINARY: No dysuria, frequency or hematuria  NEUROLOGICAL: No numbness or weakness  SKIN: No itching, burning, rashes, or lesions   All other review of systems is negative unless indicated above.    VITAL:  T(C): , Max: 36.9 (01-22-22 @ 08:33)  T(F): , Max: 98.4 (01-22-22 @ 08:33)  HR: 68 (01-22-22 @ 12:50)  BP: 94/70 (01-22-22 @ 12:50)  BP(mean): --  RR: 18 (01-22-22 @ 12:50)  SpO2: 92% (01-22-22 @ 12:50)  Wt(kg): --    I and O's:    01-20 @ 07:01  -  01-21 @ 07:00  --------------------------------------------------------  IN: 800 mL / OUT: 800 mL / NET: 0 mL    01-22 @ 07:01  -  01-22 @ 15:18  --------------------------------------------------------  IN: 330 mL / OUT: 0 mL / NET: 330 mL          PHYSICAL EXAM:    Constitutional: NAD  HEENT: PERRLA,   Neck: No JVD  Respiratory: CTA B/L  Cardiovascular: S1 and S2  Gastrointestinal: BS+, soft, NT/ND  Extremities: No peripheral edema  Neurological: A/O x 3, no focal deficits  Psychiatric: Normal mood, normal affect  : No Javier  Skin: No rashes  Access: Not applicable  Back: No CVA tenderness    LABS:                        6.7    9.81  )-----------( 208      ( 22 Jan 2022 10:11 )             21.7     01-22    124<L>  |  87<L>  |  70<H>  ----------------------------<  123<H>  4.0   |  23  |  3.52<H>    Ca    10.2      22 Jan 2022 10:11            RADIOLOGY & ADDITIONAL STUDIES:

## 2022-01-22 NOTE — PROGRESS NOTE ADULT - SUBJECTIVE AND OBJECTIVE BOX
Patient seen and examined. Appears comfortably resting. NAD noted.  HD was done today - 1L tolerated.   Tube Feed      MEDICATIONS  (STANDING):  atorvastatin 80 milliGRAM(s) Oral at bedtime  carbidopa/levodopa  25/100 2.5 Tablet(s) Oral <User Schedule>  carbidopa/levodopa  25/100 2 Tablet(s) Oral <User Schedule>  chlorhexidine 4% Liquid 1 Application(s) Topical <User Schedule>  cinacalcet 60 milliGRAM(s) Oral daily  dextrose 40% Gel 15 Gram(s) Oral once  dextrose 5%. 1000 milliLiter(s) (50 mL/Hr) IV Continuous <Continuous>  dextrose 5%. 1000 milliLiter(s) (100 mL/Hr) IV Continuous <Continuous>  dextrose 50% Injectable 25 Gram(s) IV Push once  dextrose 50% Injectable 12.5 Gram(s) IV Push once  dextrose 50% Injectable 25 Gram(s) IV Push once  diVALproex Sprinkle 250 milliGRAM(s) Oral three times a day  DULoxetine 20 milliGRAM(s) Oral daily  folic acid 1 milliGRAM(s) Oral daily  glucagon  Injectable 1 milliGRAM(s) IntraMuscular once  heparin   Injectable 5000 Unit(s) SubCutaneous every 12 hours  insulin glargine Injectable (LANTUS) 6 Unit(s) SubCutaneous at bedtime  insulin lispro (ADMELOG) corrective regimen sliding scale   SubCutaneous every 6 hours  levothyroxine 25 MICROGram(s) Oral daily  memantine 5 milliGRAM(s) Oral at bedtime  metoprolol tartrate 50 milliGRAM(s) Oral two times a day  midodrine 10 milliGRAM(s) Oral <User Schedule>  mirtazapine 7.5 milliGRAM(s) Oral daily  Nephro-celeste 1 Tablet(s) Oral daily  pantoprazole   Suspension 40 milliGRAM(s) Enteral Tube two times a day  QUEtiapine 50 milliGRAM(s) Oral at bedtime  trihexyphenidyl 2 milliGRAM(s) Oral three times a day      VITAL:  T(C): , Max: 36.9 (01-22-22 @ 08:33)  T(F): , Max: 98.4 (01-22-22 @ 08:33)  HR: 65 (01-22-22 @ 16:55)  BP: 138/84 (01-22-22 @ 16:55)  BP(mean): --  RR: 17 (01-22-22 @ 16:55)  SpO2: 94% (01-22-22 @ 16:55)  Wt(kg): --    I and O's:    01-22 @ 07:01  -  01-22 @ 20:32  --------------------------------------------------------  IN: 661 mL / OUT: 1000 mL / NET: -339 mL          PHYSICAL EXAM:    Constitutional: NAD  Neck: No JVD  Respiratory: CTAB  Cardiovascular: S1 and S2  Gastrointestinal: BS+, soft, NT/ND  Extremities: +  peripheral edema  Neurological: A/O x 3, no focal deficits  Psychiatric: Normal mood, normal affect  : No Javier  Dialysis access: LT AVF (+ thrill) and permcath        LABS:                        6.7    9.81  )-----------( 208      ( 22 Jan 2022 10:11 )             21.7     01-22    124<L>  |  87<L>  |  70<H>  ----------------------------<  123<H>  4.0   |  23  |  3.52<H>    Ca    10.2      22 Jan 2022 10:11

## 2022-01-22 NOTE — PROGRESS NOTE ADULT - ASSESSMENT
Assessment  DMT2: 71y Male with DM T2 with hyperglycemia, A1C 6.4%, was on insulin at home, on insulin, blood sugars are trending down now, no hypoglycemic events, no overnight events.  Hypothyroidism: Patient has no history thyroid disease, was not on any thyroid supplements, subclinical with low-normal FT4, lethargic, on synthroid 25 mcg po daily.  Hypercalcemia: Started on Sensipar 60mg daily, Ca fluctuating/remains elevated..  CHF: on medications, stable, monitored.  HTN: Controlled,  on antihypertensive medications.  Parkinsons: on meds, monitored.  CKD: Monitor labs/BMP      Dr Ayers  Cell : [phone]

## 2022-01-22 NOTE — PROGRESS NOTE ADULT - ASSESSMENT
71M w/ HTN, DM2, CAD-CABG, Parkinson's disease, and advanced CKD, 10/21/21 p/w LE swelling, c/b COVID; now newly ESRD-HD as of this admission    (1)Renal - ESRD-HD TTS   (2)Hyponatremia - higher Na bath with dialysis    (3)Hypercalcemia - lower calcium bath with dialysis   (4)Hypophosphatemia    (5)CV - tenuous hemodyamics - on Midodrine pre-HD      RECOMMEND  (1) Next HD on Tuesday via AVF        F/U serum Na and Ca in am iIf needs HD with higher Na and lower Ca         BP support with Midodrine  (2) Will keep permcath for now, and try AVF for 4-5 times with HD prior to remove perm cath   (3)Nutritional support

## 2022-01-22 NOTE — PROGRESS NOTE ADULT - PROBLEM SELECTOR PLAN 1
Will continue current insulin regimen for now. Will continue monitoring  blood sugars, will Follow up.  Suggest DC on Tradjenta 5mg daily, discontinue prior hypoglycemic agents/insulin, endo FU 4 weeks.

## 2022-01-22 NOTE — PROGRESS NOTE ADULT - ASSESSMENT
72yo M w/ PMHx of CAD (s/p CABG 2019), CKD (unknown stage), DM2, Parkinson's Disease, HTN, depression presents with new onset acute heart failure exacerbation, NSTEMI, started on hep gtt, developed bl RP bleed, acute anemia. sp MICU course for hemorrhagic shock, 10/28 sp IR embolization l4 and l5 lumbar artery. sp permacath and AVF.    # Acute on chronic systolic and diastolic heart failure  # NSTEMI  # ESRD, new HD  # Atrial flutter  # acute hypoxic resp failure  # hemorrhagic shock, left RP hematoma, acute blood loss anemia sp embolization l4 and l5 lumbar artery 10/28  # lupus anticoagulant +  # hyponatremia  # Parkinson's disease   # delirium  # CAD (coronary artery disease)  # HTN  # DM2 (diabetes mellitus, type 2)  # FTT sp PEG  HD via permacath per renal, midodrine during dialysis  sp L AVF - can be used now for HD  cont on depakote PRN, seroquel 50mg qhs   namenda, sinemet  cymbalta, remeron  trihexyphenidyl  insulin per endo  lopressor, atorvastatin  tube feeds, abd binder  dw renal, can dc with permacath and use AVF for HD, outpt permacath removal  1 unit prbc today with HD    DVT ppx hsq    DNR/DNI    dc planning    please call [facility] @ [phone] for questions or concerns

## 2022-01-22 NOTE — PROGRESS NOTE ADULT - SUBJECTIVE AND OBJECTIVE BOX
Patient is a 71y old  Male who presents with a chief complaint of leg swelling (21 Jan 2022 15:19)      SUBJECTIVE / OVERNIGHT EVENTS:    Patient seen and examined.   no acute events. HD today.    Vital Signs Last 24 Hrs  T(C): 34.6 (22 Jan 2022 09:36), Max: 36.9 (22 Jan 2022 08:33)  T(F): 94.2 (22 Jan 2022 09:36), Max: 98.4 (22 Jan 2022 08:33)  HR: 83 (22 Jan 2022 09:36) (80 - 91)  BP: 129/70 (22 Jan 2022 09:36) (123/69 - 156/79)  BP(mean): --  RR: 18 (22 Jan 2022 09:36) (18 - 18)  SpO2: 94% (22 Jan 2022 09:36) (94% - 99%)  I&O's Summary      PE:  GENERAL: NAD, AAOx0-1  HEAD:  Atraumatic, Normocephalic  EYES: conjunctiva and sclera clear  NECK:  No JVD  CHEST/LUNG: CTABL, No wheeze, right chest wall permacath  HEART: Regular rate and rhythm; no murmur  ABDOMEN: Soft, Nontender, Nondistended; Bowel sounds present, peg  EXTREMITIES:  2+ Peripheral Pulses, trace le edema, left arm avf  NEURO: No focal deficits    LABS:                        6.7    9.81  )-----------( 208      ( 22 Jan 2022 10:11 )             21.7     01-22    124<L>  |  87<L>  |  70<H>  ----------------------------<  123<H>  4.0   |  23  |  3.52<H>    Ca    10.2      22 Jan 2022 10:11        CAPILLARY BLOOD GLUCOSE      POCT Blood Glucose.: 141 mg/dL (22 Jan 2022 08:02)  POCT Blood Glucose.: 200 mg/dL (22 Jan 2022 00:22)  POCT Blood Glucose.: 206 mg/dL (21 Jan 2022 21:30)  POCT Blood Glucose.: 119 mg/dL (21 Jan 2022 17:04)            RADIOLOGY & ADDITIONAL TESTS:    Imaging Personally Reviewed:  [x] YES  [ ] NO    Consultant(s) Notes Reviewed:  [x] YES  [ ] NO    MEDICATIONS  (STANDING):  atorvastatin 80 milliGRAM(s) Oral at bedtime  carbidopa/levodopa  25/100 2.5 Tablet(s) Oral <User Schedule>  carbidopa/levodopa  25/100 2 Tablet(s) Oral <User Schedule>  chlorhexidine 4% Liquid 1 Application(s) Topical <User Schedule>  cinacalcet 60 milliGRAM(s) Oral daily  dextrose 40% Gel 15 Gram(s) Oral once  dextrose 5%. 1000 milliLiter(s) (50 mL/Hr) IV Continuous <Continuous>  dextrose 5%. 1000 milliLiter(s) (100 mL/Hr) IV Continuous <Continuous>  dextrose 50% Injectable 25 Gram(s) IV Push once  dextrose 50% Injectable 12.5 Gram(s) IV Push once  dextrose 50% Injectable 25 Gram(s) IV Push once  diVALproex Sprinkle 250 milliGRAM(s) Oral three times a day  DULoxetine 20 milliGRAM(s) Oral daily  epoetin mari-epbx (RETACRIT) Injectable 71011 Unit(s) IV Push once  folic acid 1 milliGRAM(s) Oral daily  glucagon  Injectable 1 milliGRAM(s) IntraMuscular once  heparin   Injectable 5000 Unit(s) SubCutaneous every 12 hours  insulin glargine Injectable (LANTUS) 6 Unit(s) SubCutaneous at bedtime  insulin lispro (ADMELOG) corrective regimen sliding scale   SubCutaneous every 6 hours  levothyroxine 25 MICROGram(s) Oral daily  memantine 5 milliGRAM(s) Oral at bedtime  metoprolol tartrate 50 milliGRAM(s) Oral two times a day  midodrine 10 milliGRAM(s) Oral <User Schedule>  mirtazapine 7.5 milliGRAM(s) Oral daily  Nephro-celeste 1 Tablet(s) Oral daily  pantoprazole   Suspension 40 milliGRAM(s) Enteral Tube two times a day  QUEtiapine 50 milliGRAM(s) Oral at bedtime  trihexyphenidyl 2 milliGRAM(s) Oral three times a day    MEDICATIONS  (PRN):  acetaminophen     Tablet .. 650 milliGRAM(s) Oral every 6 hours PRN Mild Pain (1 - 3)  albuterol/ipratropium for Nebulization 3 milliLiter(s) Nebulizer every 6 hours PRN Shortness of Breath and/or Wheezing  diVALproex Sprinkle 125 milliGRAM(s) Oral every 8 hours PRN Agitation  guaiFENesin Oral Liquid (Sugar-Free) 200 milliGRAM(s) Oral every 6 hours PRN Cough  ondansetron Injectable 4 milliGRAM(s) IV Push once PRN Nausea and/or Vomiting  QUEtiapine 12.5 milliGRAM(s) Oral every 6 hours PRN agitation  sodium chloride 0.9% lock flush 10 milliLiter(s) IV Push every 1 hour PRN Pre/post blood products, medications, blood draw, and to maintain line patency      Care Discussed with Consultants/Other Providers [x] YES  [ ] NO    HEALTH ISSUES - PROBLEM Dx:  Acute heart failure    Atrial flutter    DM2 (diabetes mellitus, type 2)    CAD (coronary artery disease)    Parkinsons disease    Acute on chronic renal failure    NSTEMI (non-ST elevation myocardial infarction)    Hypertension    Acute kidney injury superimposed on CKD    Anemia    Hypothyroidism    Delirium    Advanced care planning/counseling discussion    Palliative care status    Palliative care encounter    Pain    Stage 5 chronic kidney disease not on chronic dialysis    Hypercalcemia    Preop cardiovascular exam

## 2022-01-22 NOTE — PROGRESS NOTE ADULT - SUBJECTIVE AND OBJECTIVE BOX
Chief complaint    Patient is a 71y old  Male who presents with a chief complaint of leg swelling (22 Jan 2022 15:18)   Review of systems  Patient in bed, appears comfortable.    Labs and Fingersticks  CAPILLARY BLOOD GLUCOSE      POCT Blood Glucose.: 74 mg/dL (22 Jan 2022 17:09)  POCT Blood Glucose.: 141 mg/dL (22 Jan 2022 08:02)  POCT Blood Glucose.: 200 mg/dL (22 Jan 2022 00:22)  POCT Blood Glucose.: 206 mg/dL (21 Jan 2022 21:30)      Anion Gap, Serum: 14 (01-22 @ 10:11)  Anion Gap, Serum: 13 (01-21 @ 06:04)      Calcium, Total Serum: 10.2 (01-22 @ 10:11)  Calcium, Total Serum: 9.5 (01-21 @ 06:04)          01-22    124<L>  |  87<L>  |  70<H>  ----------------------------<  123<H>  4.0   |  23  |  3.52<H>    Ca    10.2      22 Jan 2022 10:11                          6.7    9.81  )-----------( 208      ( 22 Jan 2022 10:11 )             21.7     Medications  MEDICATIONS  (STANDING):  atorvastatin 80 milliGRAM(s) Oral at bedtime  carbidopa/levodopa  25/100 2.5 Tablet(s) Oral <User Schedule>  carbidopa/levodopa  25/100 2 Tablet(s) Oral <User Schedule>  chlorhexidine 4% Liquid 1 Application(s) Topical <User Schedule>  cinacalcet 60 milliGRAM(s) Oral daily  dextrose 40% Gel 15 Gram(s) Oral once  dextrose 5%. 1000 milliLiter(s) (50 mL/Hr) IV Continuous <Continuous>  dextrose 5%. 1000 milliLiter(s) (100 mL/Hr) IV Continuous <Continuous>  dextrose 50% Injectable 25 Gram(s) IV Push once  dextrose 50% Injectable 12.5 Gram(s) IV Push once  dextrose 50% Injectable 25 Gram(s) IV Push once  diVALproex Sprinkle 250 milliGRAM(s) Oral three times a day  DULoxetine 20 milliGRAM(s) Oral daily  folic acid 1 milliGRAM(s) Oral daily  glucagon  Injectable 1 milliGRAM(s) IntraMuscular once  heparin   Injectable 5000 Unit(s) SubCutaneous every 12 hours  insulin glargine Injectable (LANTUS) 6 Unit(s) SubCutaneous at bedtime  insulin lispro (ADMELOG) corrective regimen sliding scale   SubCutaneous every 6 hours  levothyroxine 25 MICROGram(s) Oral daily  memantine 5 milliGRAM(s) Oral at bedtime  metoprolol tartrate 50 milliGRAM(s) Oral two times a day  midodrine 10 milliGRAM(s) Oral <User Schedule>  mirtazapine 7.5 milliGRAM(s) Oral daily  Nephro-celeste 1 Tablet(s) Oral daily  pantoprazole   Suspension 40 milliGRAM(s) Enteral Tube two times a day  QUEtiapine 50 milliGRAM(s) Oral at bedtime  trihexyphenidyl 2 milliGRAM(s) Oral three times a day      Physical Exam  General: Patient comfortable in bed  Vital Signs Last 12 Hrs  T(F): 97.5 (01-22-22 @ 16:00), Max: 98.4 (01-22-22 @ 08:33)  HR: 65 (01-22-22 @ 16:55) (61 - 83)  BP: 138/84 (01-22-22 @ 16:55) (94/70 - 156/79)  BP(mean): --  RR: 17 (01-22-22 @ 16:55) (17 - 18)  SpO2: 94% (01-22-22 @ 16:55) (92% - 97%)  Neck: No palpable thyroid nodules.  CVS: S1S2, No murmurs  Respiratory: No wheezing, no crepitations  GI: Abdomen soft, bowel sounds positive  Musculoskeletal:  edema lower extremities.     Diagnostics    Free Thyroxine, Serum: AM Sched. Collection: 19-Jan-2022 06:00 (01-18 @ 15:42)  Cortisol AM, Serum: AM Sched. Collection: 15-Paul-2022 06:00 (01-14 @ 08:01)  Free Thyroxine, Serum: AM Sched. Collection: 15-Paul-2022 06:00 (01-14 @ 08:01)  Thyroid Stimulating Hormone, Serum: AM Sched. Collection: 15-Paul-2022 06:00 (01-14 @ 08:01)

## 2022-01-23 NOTE — PROGRESS NOTE ADULT - SUBJECTIVE AND OBJECTIVE BOX
Chief complaint  Patient is a 71y old  Male who presents with a chief complaint of leg swelling (23 Jan 2022 13:07)   Review of systems  Patient in bed, looks comfortable, no hypoglycemic episodes.    Labs and Fingersticks  CAPILLARY BLOOD GLUCOSE      POCT Blood Glucose.: 196 mg/dL (23 Jan 2022 11:26)  POCT Blood Glucose.: 143 mg/dL (23 Jan 2022 07:56)  POCT Blood Glucose.: 139 mg/dL (23 Jan 2022 06:00)  POCT Blood Glucose.: 200 mg/dL (23 Jan 2022 00:27)  POCT Blood Glucose.: 225 mg/dL (22 Jan 2022 21:43)  POCT Blood Glucose.: 74 mg/dL (22 Jan 2022 17:09)      Anion Gap, Serum: 14 (01-23 @ 11:07)  Anion Gap, Serum: 14 (01-22 @ 10:11)      Calcium, Total Serum: 9.9 (01-23 @ 11:07)  Calcium, Total Serum: 10.2 (01-22 @ 10:11)          01-23    129<L>  |  91<L>  |  54<H>  ----------------------------<  223<H>  4.0   |  24  |  2.69<H>    Ca    9.9      23 Jan 2022 11:07                          8.6    8.09  )-----------( 213      ( 23 Jan 2022 11:07 )             27.5     Medications  MEDICATIONS  (STANDING):  atorvastatin 80 milliGRAM(s) Oral at bedtime  carbidopa/levodopa  25/100 2.5 Tablet(s) Oral <User Schedule>  carbidopa/levodopa  25/100 2 Tablet(s) Oral <User Schedule>  chlorhexidine 4% Liquid 1 Application(s) Topical <User Schedule>  cinacalcet 60 milliGRAM(s) Oral daily  dextrose 40% Gel 15 Gram(s) Oral once  dextrose 5%. 1000 milliLiter(s) (50 mL/Hr) IV Continuous <Continuous>  dextrose 5%. 1000 milliLiter(s) (100 mL/Hr) IV Continuous <Continuous>  dextrose 50% Injectable 25 Gram(s) IV Push once  dextrose 50% Injectable 12.5 Gram(s) IV Push once  dextrose 50% Injectable 25 Gram(s) IV Push once  diVALproex Sprinkle 250 milliGRAM(s) Oral three times a day  DULoxetine 20 milliGRAM(s) Oral daily  folic acid 1 milliGRAM(s) Oral daily  glucagon  Injectable 1 milliGRAM(s) IntraMuscular once  heparin   Injectable 5000 Unit(s) SubCutaneous every 12 hours  insulin glargine Injectable (LANTUS) 6 Unit(s) SubCutaneous at bedtime  insulin lispro (ADMELOG) corrective regimen sliding scale   SubCutaneous every 6 hours  levothyroxine 25 MICROGram(s) Oral daily  memantine 5 milliGRAM(s) Oral at bedtime  metoprolol tartrate 50 milliGRAM(s) Oral two times a day  midodrine 10 milliGRAM(s) Oral <User Schedule>  mirtazapine 7.5 milliGRAM(s) Oral daily  Nephro-celeste 1 Tablet(s) Oral daily  pantoprazole   Suspension 40 milliGRAM(s) Enteral Tube two times a day  QUEtiapine 50 milliGRAM(s) Oral at bedtime  trihexyphenidyl 2 milliGRAM(s) Oral three times a day      Physical Exam  General: Patient comfortable in bed  Vital Signs Last 12 Hrs  T(F): 97.7 (01-23-22 @ 12:04), Max: 98.2 (01-23-22 @ 05:56)  HR: 85 (01-23-22 @ 12:04) (65 - 85)  BP: 133/78 (01-23-22 @ 12:04) (132/70 - 133/78)  BP(mean): --  RR: 18 (01-23-22 @ 12:04) (18 - 18)  SpO2: 93% (01-23-22 @ 12:04) (93% - 97%)  Neck: No palpable thyroid nodules.  CVS: S1S2, No murmurs  Respiratory: No wheezing, no crepitations  GI: Abdomen soft, bowel sounds positive  Musculoskeletal:  edema lower extremities.   Skin: No skin rashes, no ecchymosis    Diagnostics               Chief complaint  Patient is a 71y old  Male who presents with a chief complaint of leg swelling (23 Jan 2022 13:07)   Review of systems  Patient in bed, looks comfortable,  no hypoglycemic episodes.    Labs and Fingersticks  CAPILLARY BLOOD GLUCOSE      POCT Blood Glucose.: 196 mg/dL (23 Jan 2022 11:26)  POCT Blood Glucose.: 143 mg/dL (23 Jan 2022 07:56)  POCT Blood Glucose.: 139 mg/dL (23 Jan 2022 06:00)  POCT Blood Glucose.: 200 mg/dL (23 Jan 2022 00:27)  POCT Blood Glucose.: 225 mg/dL (22 Jan 2022 21:43)  POCT Blood Glucose.: 74 mg/dL (22 Jan 2022 17:09)      Anion Gap, Serum: 14 (01-23 @ 11:07)  Anion Gap, Serum: 14 (01-22 @ 10:11)      Calcium, Total Serum: 9.9 (01-23 @ 11:07)  Calcium, Total Serum: 10.2 (01-22 @ 10:11)          01-23    129<L>  |  91<L>  |  54<H>  ----------------------------<  223<H>  4.0   |  24  |  2.69<H>    Ca    9.9      23 Jan 2022 11:07                          8.6    8.09  )-----------( 213      ( 23 Jan 2022 11:07 )             27.5     Medications  MEDICATIONS  (STANDING):  atorvastatin 80 milliGRAM(s) Oral at bedtime  carbidopa/levodopa  25/100 2.5 Tablet(s) Oral <User Schedule>  carbidopa/levodopa  25/100 2 Tablet(s) Oral <User Schedule>  chlorhexidine 4% Liquid 1 Application(s) Topical <User Schedule>  cinacalcet 60 milliGRAM(s) Oral daily  dextrose 40% Gel 15 Gram(s) Oral once  dextrose 5%. 1000 milliLiter(s) (50 mL/Hr) IV Continuous <Continuous>  dextrose 5%. 1000 milliLiter(s) (100 mL/Hr) IV Continuous <Continuous>  dextrose 50% Injectable 25 Gram(s) IV Push once  dextrose 50% Injectable 12.5 Gram(s) IV Push once  dextrose 50% Injectable 25 Gram(s) IV Push once  diVALproex Sprinkle 250 milliGRAM(s) Oral three times a day  DULoxetine 20 milliGRAM(s) Oral daily  folic acid 1 milliGRAM(s) Oral daily  glucagon  Injectable 1 milliGRAM(s) IntraMuscular once  heparin   Injectable 5000 Unit(s) SubCutaneous every 12 hours  insulin glargine Injectable (LANTUS) 6 Unit(s) SubCutaneous at bedtime  insulin lispro (ADMELOG) corrective regimen sliding scale   SubCutaneous every 6 hours  levothyroxine 25 MICROGram(s) Oral daily  memantine 5 milliGRAM(s) Oral at bedtime  metoprolol tartrate 50 milliGRAM(s) Oral two times a day  midodrine 10 milliGRAM(s) Oral <User Schedule>  mirtazapine 7.5 milliGRAM(s) Oral daily  Nephro-celeste 1 Tablet(s) Oral daily  pantoprazole   Suspension 40 milliGRAM(s) Enteral Tube two times a day  QUEtiapine 50 milliGRAM(s) Oral at bedtime  trihexyphenidyl 2 milliGRAM(s) Oral three times a day      Physical Exam  General: Patient comfortable in bed  Vital Signs Last 12 Hrs  T(F): 97.7 (01-23-22 @ 12:04), Max: 98.2 (01-23-22 @ 05:56)  HR: 85 (01-23-22 @ 12:04) (65 - 85)  BP: 133/78 (01-23-22 @ 12:04) (132/70 - 133/78)  BP(mean): --  RR: 18 (01-23-22 @ 12:04) (18 - 18)  SpO2: 93% (01-23-22 @ 12:04) (93% - 97%)  Neck: No palpable thyroid nodules.  CVS: S1S2, No murmurs  Respiratory: No wheezing, no crepitations  GI: Abdomen soft, bowel sounds positive  Musculoskeletal:  edema lower extremities.   Skin: No skin rashes, no ecchymosis    Diagnostics

## 2022-01-23 NOTE — PROGRESS NOTE ADULT - SUBJECTIVE AND OBJECTIVE BOX
ORLIN [id number]  71yMale  T(C): 36.5 (01-23-22 @ 12:04), Max: 36.9 (01-22-22 @ 21:44)  HR: 85 (01-23-22 @ 12:04) (60 - 85)  BP: 133/78 (01-23-22 @ 12:04) (103/86 - 145/77)  RR: 18 (01-23-22 @ 12:04) (17 - 18)  SpO2: 93% (01-23-22 @ 12:04) (93% - 97%)  Wt(kg): --  01-22 @ 07:01  -  01-23 @ 07:00  --------------------------------------------------------  IN: 661 mL / OUT: 1000 mL / NET: -339 mL      normal cephalic atraumatic  s1s2   clear to ascultation bilaterally  soft, non tender, non distended no guarding or rebound  no clubbing cyanosis or edema

## 2022-01-23 NOTE — PROGRESS NOTE ADULT - PROBLEM SELECTOR PLAN 1
Will continue current insulin regimen for now. Will continue monitoring FS and FU.  Suggest DC on Tradjenta 5mg daily, discontinue prior hypoglycemic agents/insulin, endo FU 4 weeks.

## 2022-01-23 NOTE — PROGRESS NOTE ADULT - SUBJECTIVE AND OBJECTIVE BOX
Patient is a 71y old  Male who presents with a chief complaint of leg swelling (22 Jan 2022 20:31)      SUBJECTIVE / OVERNIGHT EVENTS:    Patient seen and examined. sp 1 unit prbc. no acute issues.      Vital Signs Last 24 Hrs  T(C): 36.8 (23 Jan 2022 05:56), Max: 36.9 (22 Jan 2022 21:44)  T(F): 98.2 (23 Jan 2022 05:56), Max: 98.4 (22 Jan 2022 21:44)  HR: 65 (23 Jan 2022 05:56) (60 - 83)  BP: 132/70 (23 Jan 2022 05:56) (94/70 - 145/77)  BP(mean): --  RR: 18 (23 Jan 2022 05:56) (17 - 18)  SpO2: 97% (23 Jan 2022 05:56) (92% - 97%)  I&O's Summary    22 Jan 2022 07:01  -  23 Jan 2022 07:00  --------------------------------------------------------  IN: 661 mL / OUT: 1000 mL / NET: -339 mL        PE:  GENERAL: NAD, AAOx0-1  HEAD:  Atraumatic, Normocephalic  EYES: conjunctiva and sclera clear  NECK:  No JVD  CHEST/LUNG: CTABL, No wheeze, right chest wall permacath  HEART: Regular rate and rhythm; no murmur  ABDOMEN: Soft, Nontender, Nondistended; Bowel sounds present, peg  EXTREMITIES:  2+ Peripheral Pulses, trace le edema, left arm avf  NEURO: No focal deficits    LABS:                        6.7    9.81  )-----------( 208      ( 22 Jan 2022 10:11 )             21.7     01-22    124<L>  |  87<L>  |  70<H>  ----------------------------<  123<H>  4.0   |  23  |  3.52<H>    Ca    10.2      22 Jan 2022 10:11        CAPILLARY BLOOD GLUCOSE      POCT Blood Glucose.: 143 mg/dL (23 Jan 2022 07:56)  POCT Blood Glucose.: 139 mg/dL (23 Jan 2022 06:00)  POCT Blood Glucose.: 200 mg/dL (23 Jan 2022 00:27)  POCT Blood Glucose.: 225 mg/dL (22 Jan 2022 21:43)  POCT Blood Glucose.: 74 mg/dL (22 Jan 2022 17:09)            RADIOLOGY & ADDITIONAL TESTS:    Imaging Personally Reviewed:  [x] YES  [ ] NO    Consultant(s) Notes Reviewed:  [x] YES  [ ] NO    MEDICATIONS  (STANDING):  atorvastatin 80 milliGRAM(s) Oral at bedtime  carbidopa/levodopa  25/100 2.5 Tablet(s) Oral <User Schedule>  carbidopa/levodopa  25/100 2 Tablet(s) Oral <User Schedule>  chlorhexidine 4% Liquid 1 Application(s) Topical <User Schedule>  cinacalcet 60 milliGRAM(s) Oral daily  dextrose 40% Gel 15 Gram(s) Oral once  dextrose 5%. 1000 milliLiter(s) (100 mL/Hr) IV Continuous <Continuous>  dextrose 5%. 1000 milliLiter(s) (50 mL/Hr) IV Continuous <Continuous>  dextrose 50% Injectable 25 Gram(s) IV Push once  dextrose 50% Injectable 12.5 Gram(s) IV Push once  dextrose 50% Injectable 25 Gram(s) IV Push once  diVALproex Sprinkle 250 milliGRAM(s) Oral three times a day  DULoxetine 20 milliGRAM(s) Oral daily  folic acid 1 milliGRAM(s) Oral daily  glucagon  Injectable 1 milliGRAM(s) IntraMuscular once  heparin   Injectable 5000 Unit(s) SubCutaneous every 12 hours  insulin glargine Injectable (LANTUS) 6 Unit(s) SubCutaneous at bedtime  insulin lispro (ADMELOG) corrective regimen sliding scale   SubCutaneous every 6 hours  levothyroxine 25 MICROGram(s) Oral daily  memantine 5 milliGRAM(s) Oral at bedtime  metoprolol tartrate 50 milliGRAM(s) Oral two times a day  midodrine 10 milliGRAM(s) Oral <User Schedule>  mirtazapine 7.5 milliGRAM(s) Oral daily  Nephro-celeste 1 Tablet(s) Oral daily  pantoprazole   Suspension 40 milliGRAM(s) Enteral Tube two times a day  QUEtiapine 50 milliGRAM(s) Oral at bedtime  trihexyphenidyl 2 milliGRAM(s) Oral three times a day    MEDICATIONS  (PRN):  acetaminophen     Tablet .. 650 milliGRAM(s) Oral every 6 hours PRN Mild Pain (1 - 3)  albuterol/ipratropium for Nebulization 3 milliLiter(s) Nebulizer every 6 hours PRN Shortness of Breath and/or Wheezing  diVALproex Sprinkle 125 milliGRAM(s) Oral every 8 hours PRN Agitation  guaiFENesin Oral Liquid (Sugar-Free) 200 milliGRAM(s) Oral every 6 hours PRN Cough  ondansetron Injectable 4 milliGRAM(s) IV Push once PRN Nausea and/or Vomiting  QUEtiapine 12.5 milliGRAM(s) Oral every 6 hours PRN agitation  sodium chloride 0.9% lock flush 10 milliLiter(s) IV Push every 1 hour PRN Pre/post blood products, medications, blood draw, and to maintain line patency      Care Discussed with Consultants/Other Providers [x] YES  [ ] NO    HEALTH ISSUES - PROBLEM Dx:  Acute heart failure    Atrial flutter    DM2 (diabetes mellitus, type 2)    CAD (coronary artery disease)    Parkinsons disease    Acute on chronic renal failure    NSTEMI (non-ST elevation myocardial infarction)    Hypertension    Acute kidney injury superimposed on CKD    Anemia    Hypothyroidism    Delirium    Advanced care planning/counseling discussion    Palliative care status    Palliative care encounter    Pain    Stage 5 chronic kidney disease not on chronic dialysis    Hypercalcemia    Preop cardiovascular exam

## 2022-01-23 NOTE — PROGRESS NOTE ADULT - ASSESSMENT
72yo M w/ PMHx of CAD (s/p CABG 2019), CKD (unknown stage), DM2, Parkinson's Disease, HTN, depression presents with new onset acute heart failure exacerbation, NSTEMI, started on hep gtt, developed bl RP bleed, acute anemia. sp MICU course for hemorrhagic shock, 10/28 sp IR embolization l4 and l5 lumbar artery. sp permacath and AVF.    # Acute on chronic systolic and diastolic heart failure  # NSTEMI  # ESRD, new HD  # Atrial flutter  # acute hypoxic resp failure  # hemorrhagic shock, left RP hematoma, acute blood loss anemia sp embolization l4 and l5 lumbar artery 10/28  # lupus anticoagulant +  # hyponatremia  # Parkinson's disease   # delirium  # CAD (coronary artery disease)  # HTN  # DM2 (diabetes mellitus, type 2)  # FTT sp PEG  HD via permacath per renal, midodrine during dialysis  sp L AVF - can be used now for HD  cont on depakote PRN, seroquel 50mg qhs   namenda, sinemet  cymbalta, remeron  trihexyphenidyl  insulin per endo  lopressor, atorvastatin  tube feeds, abd binder  dw renal, can dc with permacath and use AVF for HD, outpt permacath removal  fu CBC after transfusion    DVT ppx hsq    DNR/DNI    dc planning    please call [facility] @ [phone] for questions or concerns

## 2022-01-23 NOTE — PROGRESS NOTE ADULT - ASSESSMENT
Assessment  DMT2: 71y Male with DM T2 with hyperglycemia, A1C 6.4%, was on insulin at home, started on basal insulin and coverage, blood sugars are fluctuating within overall acceptable range, no hypoglycemic events, on tube feeds, NAD, no overnight events.  Hypothyroidism: Patient has no history thyroid disease, was not on any thyroid supplements, subclinical with low-normal FT4, lethargic, on synthroid 25 mcg po daily.  Hypercalcemia: Started on Sensipar 60mg daily, Ca fluctuating/remains elevated..  CHF: on medications, stable, monitored.  HTN: Controlled,  on antihypertensive medications.  Parkinsons: on meds, monitored.  CKD: Monitor labs/BMP      Dr Ayers  Cell : [phone]   Assessment  DMT2: 71y Male with DM T2 with hyperglycemia, A1C 6.4%, was on insulin at home, started on basal insulin and coverage, blood sugars are fluctuating within overall  acceptable range, no hypoglycemic events, on tube feeds, NAD, no overnight events.  Hypothyroidism: Patient has no history thyroid disease, was not on any thyroid supplements, subclinical with low-normal FT4, lethargic, on synthroid 25 mcg po daily.  Hypercalcemia: Started on Sensipar 60mg daily, Ca fluctuating/remains elevated..  CHF: on medications, stable, monitored.  HTN: Controlled,  on antihypertensive medications.  Parkinsons: on meds, monitored.  CKD: Monitor labs/BMP      Dr Ayers  Cell : [phone]

## 2022-01-24 NOTE — CHART NOTE - NSCHARTNOTEFT_GEN_A_CORE
MEDICINE PA EPISODIC NOTE    Notified by RN of RRT called for pt. having lethargy. Pt. seen and examined at bedside, AOx1, responding to his name otherwise noticeably somnolent, moving extremities. Please refer to RRT sheet for further details. Will f/u labs and replete E-lytes as needed. Will f/u CTH, monitor VS and endorse to day team in AM.    Rodriguez Duenas PA-C  Dept. of Medicine  [facility] [phone]

## 2022-01-24 NOTE — PROGRESS NOTE ADULT - ASSESSMENT
Assessment  DMT2: 71y Male with DM T2 with hyperglycemia, A1C 6.4%, was on insulin at home, started on basal insulin and coverage, blood sugars are trending within overall acceptable range, no hypoglycemias. Patient is s/p RRT overnight for lethargy, he appears the same NAD, on tube feeds.  Hypothyroidism: Patient has no history thyroid disease, was not on any thyroid supplements, subclinical with low-normal FT4, lethargic, on synthroid 25 mcg po daily.  Hypercalcemia: Started on Sensipar 60mg daily, Ca fluctuating/remains elevated..  CHF: on medications, stable, monitored.  HTN: Controlled,  on antihypertensive medications.  Parkinsons: on meds, monitored.  CKD: Monitor labs/BMP      Dr Ayers  Cell : [phone]   Assessment  DMT2: 71y Male with DM T2 with hyperglycemia, A1C 6.4%, was on insulin at home, started on basal insulin and coverage, blood sugars are trending within overall acceptable range, no hypoglycemias. Patient is s/p RRT overnight for  lethargy, he appears the same NAD, on tube feeds.  Hypothyroidism: Patient has no history thyroid disease, was not on any thyroid supplements, subclinical with low-normal FT4, lethargic, on synthroid 25 mcg po daily.  Hypercalcemia: Started on Sensipar 60mg daily, Ca fluctuating/remains elevated..  CHF: on medications, stable, monitored.  HTN: Controlled,  on antihypertensive medications.  Parkinsons: on meds, monitored.  CKD: Monitor labs/BMP      Dr Ayers  Cell : [phone]

## 2022-01-24 NOTE — PROGRESS NOTE ADULT - SUBJECTIVE AND OBJECTIVE BOX
NEPHROLOGY-[facility] [phone]        Patient seen and examined in bed.  He was the same   Seen at HD         MEDICATIONS  (STANDING):  atorvastatin 80 milliGRAM(s) Oral at bedtime  carbidopa/levodopa  25/100 2.5 Tablet(s) Oral <User Schedule>  carbidopa/levodopa  25/100 2 Tablet(s) Oral <User Schedule>  chlorhexidine 4% Liquid 1 Application(s) Topical <User Schedule>  cinacalcet 60 milliGRAM(s) Oral daily  dextrose 40% Gel 15 Gram(s) Oral once  dextrose 5%. 1000 milliLiter(s) (50 mL/Hr) IV Continuous <Continuous>  dextrose 5%. 1000 milliLiter(s) (100 mL/Hr) IV Continuous <Continuous>  dextrose 50% Injectable 25 Gram(s) IV Push once  dextrose 50% Injectable 12.5 Gram(s) IV Push once  dextrose 50% Injectable 25 Gram(s) IV Push once  diVALproex Sprinkle 250 milliGRAM(s) Oral three times a day  DULoxetine 20 milliGRAM(s) Oral daily  folic acid 1 milliGRAM(s) Oral daily  glucagon  Injectable 1 milliGRAM(s) IntraMuscular once  heparin   Injectable 5000 Unit(s) SubCutaneous every 12 hours  insulin glargine Injectable (LANTUS) 6 Unit(s) SubCutaneous at bedtime  insulin lispro (ADMELOG) corrective regimen sliding scale   SubCutaneous every 6 hours  levothyroxine 25 MICROGram(s) Oral daily  memantine 5 milliGRAM(s) Oral at bedtime  metoprolol tartrate 50 milliGRAM(s) Oral two times a day  midodrine 10 milliGRAM(s) Oral <User Schedule>  mirtazapine 7.5 milliGRAM(s) Oral daily  Nephro-celeste 1 Tablet(s) Oral daily  pantoprazole   Suspension 40 milliGRAM(s) Enteral Tube two times a day  QUEtiapine 50 milliGRAM(s) Oral at bedtime  trihexyphenidyl 2 milliGRAM(s) Oral three times a day      VITAL:  T(C): , Max: 36.9 (22 @ 04:51)  T(F): , Max: 98.4 (22 @ 04:51)  HR: 67 (22 @ 04:51)  BP: 153/71 (22 @ 04:51)  BP(mean): --  RR: 18 (22 @ 04:51)  SpO2: 97% (22 @ 04:51)  Wt(kg): --    I and O's:     @ 07:01  -   @ 07:00  --------------------------------------------------------  IN: 0 mL / OUT: 1050 mL / NET: -1050 mL          PHYSICAL EXAM:    Constitutional: NAD  Neck:  No JVD  Respiratory: CTAB/L  Cardiovascular: S1 and S2  Gastrointestinal: BS+, soft, NT/ND  Extremities: No peripheral edema  Neurological:    : No Javier  Skin: No rashes  Access: perm ; avf     LABS:                        7.9    6.85  )-----------( 203      ( 2022 01:16 )             25.2         126<L>  |  92<L>  |  68<H>  ----------------------------<  143<H>  3.8   |  24  |  3.25<H>    Ca    9.6      2022 01:17  Phos  1.6       Mg     2.1         TPro  7.0  /  Alb  1.7<L>  /  TBili  0.3  /  DBili  x   /  AST  18  /  ALT  <5<L>  /  AlkPhos  81            Urine Studies:  Urinalysis Basic - ( 2022 17:14 )    Color: Yellow / Appearance: Slightly Turbid / S.010 / pH: x  Gluc: x / Ketone: Negative  / Bili: Negative / Urobili: Negative   Blood: x / Protein: 100 / Nitrite: Negative   Leuk Esterase: Large / RBC: 0 /hpf / WBC 1 /HPF   Sq Epi: x / Non Sq Epi: 0 /hpf / Bacteria: Negative            RADIOLOGY & ADDITIONAL STUDIES:

## 2022-01-24 NOTE — PROGRESS NOTE ADULT - SUBJECTIVE AND OBJECTIVE BOX
Patient is a 71y old  Male who presents with a chief complaint of leg swelling (2022 10:37)      SUBJECTIVE / OVERNIGHT EVENTS:    Patient seen and examined. overnight, RRT for AMS lethargy. bladder scan 1 L. pt lethargic but arousable.      Vital Signs Last 24 Hrs  T(C): 36.4 (2022 11:18), Max: 36.9 (2022 04:51)  T(F): 97.5 (2022 11:18), Max: 98.4 (2022 04:51)  HR: 81 (2022 11:18) (67 - 93)  BP: 135/78 (2022 11:18) (135/78 - 155/88)  BP(mean): --  RR: 18 (2022 11:18) (18 - 18)  SpO2: 93% (2022 11:18) (93% - 97%)  I&O's Summary    2022 07:01  -  2022 07:00  --------------------------------------------------------  IN: 0 mL / OUT: 1050 mL / NET: -1050 mL        PE:  GENERAL: lethargic  HEAD:  Atraumatic, Normocephalic  NECK:  No JVD  CHEST/LUNG: CTABL, No wheeze, right chest wall permacath  HEART: Regular rate and rhythm; no murmur  ABDOMEN: Soft, Nontender, Nondistended; Bowel sounds present, peg  EXTREMITIES:  2+ Peripheral Pulses, trace le edema, left arm avf  NEURO: unable to assess, follows minimal commands    LABS:                        7.9    6.85  )-----------( 203      ( 2022 01:16 )             25.2     01-24    126<L>  |  92<L>  |  68<H>  ----------------------------<  143<H>  3.8   |  24  |  3.25<H>    Ca    9.6      2022 01:17  Phos  1.6     01-24  Mg     2.1         TPro  7.0  /  Alb  1.7<L>  /  TBili  0.3  /  DBili  x   /  AST  18  /  ALT  <5<L>  /  AlkPhos  81        CAPILLARY BLOOD GLUCOSE      POCT Blood Glucose.: 176 mg/dL (2022 12:21)  POCT Blood Glucose.: 114 mg/dL (2022 06:26)  POCT Blood Glucose.: 145 mg/dL (2022 00:58)  POCT Blood Glucose.: 158 mg/dL (2022 00:38)  POCT Blood Glucose.: 228 mg/dL (2022 21:43)  POCT Blood Glucose.: 191 mg/dL (2022 16:47)    CARDIAC MARKERS ( 2022 01:17 )  x     / x     / 11 U/L / x     / 1.8 ng/mL      Urinalysis Basic - ( 2022 17:14 )    Color: Yellow / Appearance: Slightly Turbid / S.010 / pH: x  Gluc: x / Ketone: Negative  / Bili: Negative / Urobili: Negative   Blood: x / Protein: 100 / Nitrite: Negative   Leuk Esterase: Large / RBC: 0 /hpf / WBC 1 /HPF   Sq Epi: x / Non Sq Epi: 0 /hpf / Bacteria: Negative        RADIOLOGY & ADDITIONAL TESTS:    Imaging Personally Reviewed:  [x] YES  [ ] NO    Consultant(s) Notes Reviewed:  [x] YES  [ ] NO    MEDICATIONS  (STANDING):  atorvastatin 80 milliGRAM(s) Oral at bedtime  carbidopa/levodopa  25/100 2.5 Tablet(s) Oral <User Schedule>  carbidopa/levodopa  25/100 2 Tablet(s) Oral <User Schedule>  chlorhexidine 4% Liquid 1 Application(s) Topical <User Schedule>  cinacalcet 60 milliGRAM(s) Oral daily  dextrose 40% Gel 15 Gram(s) Oral once  dextrose 5%. 1000 milliLiter(s) (50 mL/Hr) IV Continuous <Continuous>  dextrose 5%. 1000 milliLiter(s) (100 mL/Hr) IV Continuous <Continuous>  dextrose 50% Injectable 25 Gram(s) IV Push once  dextrose 50% Injectable 12.5 Gram(s) IV Push once  dextrose 50% Injectable 25 Gram(s) IV Push once  diVALproex Sprinkle 250 milliGRAM(s) Oral three times a day  DULoxetine 20 milliGRAM(s) Oral daily  folic acid 1 milliGRAM(s) Oral daily  glucagon  Injectable 1 milliGRAM(s) IntraMuscular once  heparin   Injectable 5000 Unit(s) SubCutaneous every 12 hours  insulin glargine Injectable (LANTUS) 6 Unit(s) SubCutaneous at bedtime  insulin lispro (ADMELOG) corrective regimen sliding scale   SubCutaneous every 6 hours  levothyroxine 25 MICROGram(s) Oral daily  memantine 5 milliGRAM(s) Oral at bedtime  metoprolol tartrate 50 milliGRAM(s) Oral two times a day  midodrine 10 milliGRAM(s) Oral <User Schedule>  mirtazapine 7.5 milliGRAM(s) Oral daily  Nephro-celeste 1 Tablet(s) Oral daily  pantoprazole   Suspension 40 milliGRAM(s) Enteral Tube two times a day  potassium phosphate / sodium phosphate Powder (PHOS-NaK) 1 Packet(s) Oral three times a day  trihexyphenidyl 2 milliGRAM(s) Oral three times a day    MEDICATIONS  (PRN):  acetaminophen     Tablet .. 650 milliGRAM(s) Oral every 6 hours PRN Mild Pain (1 - 3)  albuterol/ipratropium for Nebulization 3 milliLiter(s) Nebulizer every 6 hours PRN Shortness of Breath and/or Wheezing  diVALproex Sprinkle 125 milliGRAM(s) Oral every 8 hours PRN Agitation  guaiFENesin Oral Liquid (Sugar-Free) 200 milliGRAM(s) Oral every 6 hours PRN Cough  ondansetron Injectable 4 milliGRAM(s) IV Push once PRN Nausea and/or Vomiting  QUEtiapine 12.5 milliGRAM(s) Oral every 6 hours PRN agitation  sodium chloride 0.9% lock flush 10 milliLiter(s) IV Push every 1 hour PRN Pre/post blood products, medications, blood draw, and to maintain line patency      Care Discussed with Consultants/Other Providers [x] YES  [ ] NO    HEALTH ISSUES - PROBLEM Dx:  Acute heart failure    Atrial flutter    DM2 (diabetes mellitus, type 2)    CAD (coronary artery disease)    Parkinsons disease    Acute on chronic renal failure    NSTEMI (non-ST elevation myocardial infarction)    Hypertension    Acute kidney injury superimposed on CKD    Anemia    Hypothyroidism    Delirium    Advanced care planning/counseling discussion    Palliative care status    Palliative care encounter    Pain    Stage 5 chronic kidney disease not on chronic dialysis    Hypercalcemia    Preop cardiovascular exam

## 2022-01-24 NOTE — PROGRESS NOTE ADULT - ASSESSMENT
71M w/ HTN, DM2, CAD-CABG, Parkinson's disease, and advanced CKD, 10/21/21 p/w LE swelling, c/b COVID; now newly ESRD-HD as of this admission    (1)Renal - ESRD-HD TTS   (2)Hyponatremia - higher Na bath with dialysis    (3)Hypercalcemia - lower calcium bath with dialysis   (4)Hypophosphatemia    (5)CV - tenuous hemodyamics - on Midodrine pre-HD      RECOMMEND  (1) Next HD tomorrow   Nutra phos x 3 today   (2) Will keep permcath for now but using the  AVF(and may be able to removal this week)    (3)Nutritional support     May need blood xfusion if the Hgb remains low     Sayed [facility]   [phone]

## 2022-01-24 NOTE — PROGRESS NOTE ADULT - ASSESSMENT
70yo M w/ PMHx of CAD (s/p CABG 2019), CKD (unknown stage), DM2, Parkinson's Disease, HTN, depression presents with new onset acute heart failure exacerbation, NSTEMI, started on hep gtt, developed bl RP bleed, acute anemia. sp MICU course for hemorrhagic shock, 10/28 sp IR embolization l4 and l5 lumbar artery. sp permacath and AVF.    # Acute on chronic systolic and diastolic heart failure  # NSTEMI  # ESRD, new HD  # Atrial flutter  # acute hypoxic resp failure  # hemorrhagic shock, left RP hematoma, acute blood loss anemia sp embolization l4 and l5 lumbar artery 10/28  # lupus anticoagulant +  # hyponatremia  # Parkinson's disease   # delirium  # CAD (coronary artery disease)  # HTN  # DM2 (diabetes mellitus, type 2)  # FTT sp PEG  HD via permacath per renal, midodrine during dialysis  sp L AVF - can be used now for HD  more lethargic, hold Seroquel, CT head no acute finding, known meningioma  neuro fu  namenda, sinemet  cymbalta, remeron  trihexyphenidyl  insulin per endo  lopressor, atorvastatin  tube feeds, abd binder    DVT ppx hsq    DNR/DNI    please call [facility] @ [phone] for questions or concern 72yo M w/ PMHx of CAD (s/p CABG 2019), CKD (unknown stage), DM2, Parkinson's Disease, HTN, depression presents with new onset acute heart failure exacerbation, NSTEMI, started on hep gtt, developed bl RP bleed, acute anemia. sp MICU course for hemorrhagic shock, 10/28 sp IR embolization l4 and l5 lumbar artery. sp permacath and AVF.    # Acute on chronic systolic and diastolic heart failure  # NSTEMI  # ESRD, new HD  # Atrial flutter  # acute hypoxic resp failure  # hemorrhagic shock, left RP hematoma, acute blood loss anemia sp embolization l4 and l5 lumbar artery 10/28  # lupus anticoagulant +  # hyponatremia  # Parkinson's disease   # delirium  # CAD (coronary artery disease)  # HTN  # DM2 (diabetes mellitus, type 2)  # FTT sp PEG  HD via permacath per renal, midodrine during dialysis  sp L AVF - can be used now for HD  more lethargic, hold Seroquel, CT head no acute finding, known meningioma  neuro fu  check UA UCX  namenda, sinemet  cymbalta, remeron  trihexyphenidyl  insulin per endo  lopressor, atorvastatin  tube feeds, abd binder    DVT ppx hsq    DNR/DNI    please call [facility] @ [phone] for questions or concern

## 2022-01-24 NOTE — PROGRESS NOTE ADULT - SUBJECTIVE AND OBJECTIVE BOX
Chief complaint  Patient is a 71y old  Male who presents with a chief complaint of leg swelling (24 Jan 2022 15:07)   Review of systems  Overnight events reviewed.  Patient in bed, looks comfortable, no hypoglycemic episodes.    Labs and Fingersticks  CAPILLARY BLOOD GLUCOSE      POCT Blood Glucose.: 176 mg/dL (24 Jan 2022 12:21)  POCT Blood Glucose.: 114 mg/dL (24 Jan 2022 06:26)  POCT Blood Glucose.: 145 mg/dL (24 Jan 2022 00:58)  POCT Blood Glucose.: 158 mg/dL (24 Jan 2022 00:38)  POCT Blood Glucose.: 228 mg/dL (23 Jan 2022 21:43)  POCT Blood Glucose.: 191 mg/dL (23 Jan 2022 16:47)      Anion Gap, Serum: 10 (01-24 @ 01:17)  Anion Gap, Serum: 14 (01-23 @ 11:07)      Calcium, Total Serum: 9.6 (01-24 @ 01:17)  Calcium, Total Serum: 9.9 (01-23 @ 11:07)  Albumin, Serum: 1.7 *L* (01-24 @ 01:17)    Alanine Aminotransferase (ALT/SGPT): <5 *L* (01-24 @ 01:17)  Alkaline Phosphatase, Serum: 81 (01-24 @ 01:17)  Aspartate Aminotransferase (AST/SGOT): 18 (01-24 @ 01:17)        01-24    126<L>  |  92<L>  |  68<H>  ----------------------------<  143<H>  3.8   |  24  |  3.25<H>    Ca    9.6      24 Jan 2022 01:17  Phos  1.6     01-24  Mg     2.1     01-24    TPro  7.0  /  Alb  1.7<L>  /  TBili  0.3  /  DBili  x   /  AST  18  /  ALT  <5<L>  /  AlkPhos  81  01-24                        7.9    6.85  )-----------( 203      ( 24 Jan 2022 01:16 )             25.2     Medications  MEDICATIONS  (STANDING):  atorvastatin 80 milliGRAM(s) Oral at bedtime  carbidopa/levodopa  25/100 2.5 Tablet(s) Oral <User Schedule>  carbidopa/levodopa  25/100 2 Tablet(s) Oral <User Schedule>  chlorhexidine 4% Liquid 1 Application(s) Topical <User Schedule>  cinacalcet 60 milliGRAM(s) Oral daily  dextrose 40% Gel 15 Gram(s) Oral once  dextrose 5%. 1000 milliLiter(s) (100 mL/Hr) IV Continuous <Continuous>  dextrose 5%. 1000 milliLiter(s) (50 mL/Hr) IV Continuous <Continuous>  dextrose 50% Injectable 25 Gram(s) IV Push once  dextrose 50% Injectable 12.5 Gram(s) IV Push once  dextrose 50% Injectable 25 Gram(s) IV Push once  diVALproex Sprinkle 250 milliGRAM(s) Oral three times a day  DULoxetine 20 milliGRAM(s) Oral daily  folic acid 1 milliGRAM(s) Oral daily  glucagon  Injectable 1 milliGRAM(s) IntraMuscular once  heparin   Injectable 5000 Unit(s) SubCutaneous every 12 hours  insulin glargine Injectable (LANTUS) 6 Unit(s) SubCutaneous at bedtime  insulin lispro (ADMELOG) corrective regimen sliding scale   SubCutaneous every 6 hours  levothyroxine 25 MICROGram(s) Oral daily  memantine 5 milliGRAM(s) Oral at bedtime  metoprolol tartrate 50 milliGRAM(s) Oral two times a day  midodrine 10 milliGRAM(s) Oral <User Schedule>  mirtazapine 7.5 milliGRAM(s) Oral daily  Nephro-celeste 1 Tablet(s) Oral daily  pantoprazole   Suspension 40 milliGRAM(s) Enteral Tube two times a day  potassium phosphate / sodium phosphate Powder (PHOS-NaK) 1 Packet(s) Oral three times a day  trihexyphenidyl 2 milliGRAM(s) Oral three times a day      Physical Exam  General: Patient comfortable in bed  Vital Signs Last 12 Hrs  T(F): 97.5 (01-24-22 @ 11:18), Max: 98.4 (01-24-22 @ 04:51)  HR: 81 (01-24-22 @ 11:18) (67 - 81)  BP: 135/78 (01-24-22 @ 11:18) (135/78 - 153/71)  BP(mean): --  RR: 18 (01-24-22 @ 11:18) (18 - 18)  SpO2: 93% (01-24-22 @ 11:18) (93% - 97%)  Neck: No palpable thyroid nodules.  CVS: S1S2, No murmurs  Respiratory: No wheezing, no crepitations  GI: Abdomen soft, bowel sounds positive  Musculoskeletal:  edema lower extremities.   Skin: No skin rashes, no ecchymosis    Diagnostics             Chief complaint  Patient is a 71y old  Male who presents with a chief complaint of leg swelling (24 Jan 2022 15:07)   Review of systems  Overnight events reviewed.  Patient in bed, looks comfortable, no hypoglycemic episodes.    Labs and Fingersticks  CAPILLARY BLOOD GLUCOSE      POCT Blood Glucose.: 176 mg/dL (24 Jan 2022 12:21)  POCT Blood Glucose.: 114 mg/dL (24 Jan 2022 06:26)  POCT Blood Glucose.: 145 mg/dL (24 Jan 2022 00:58)  POCT Blood Glucose.: 158 mg/dL (24 Jan 2022 00:38)  POCT Blood Glucose.: 228 mg/dL (23 Jan 2022 21:43)  POCT Blood Glucose.: 191 mg/dL (23 Jan 2022 16:47)      Anion Gap, Serum: 10 (01-24 @ 01:17)  Anion Gap, Serum: 14 (01-23 @ 11:07)      Calcium, Total Serum: 9.6 (01-24 @ 01:17)  Calcium, Total Serum: 9.9 (01-23 @ 11:07)  Albumin, Serum: 1.7 *L* (01-24 @ 01:17)    Alanine Aminotransferase (ALT/SGPT): <5 *L* (01-24 @ 01:17)  Alkaline Phosphatase, Serum: 81 (01-24 @ 01:17)  Aspartate Aminotransferase (AST/SGOT): 18 (01-24 @ 01:17)        01-24    126<L>  |  92<L>  |  68<H>  ----------------------------<  143<H>  3.8   |  24  |  3.25<H>    Ca    9.6      24 Jan 2022 01:17  Phos  1.6     01-24  Mg     2.1     01-24    TPro  7.0  /  Alb  1.7<L>  /  TBili  0.3  /  DBili  x   /  AST  18  /  ALT  <5<L>  /  AlkPhos  81  01-24                        7.9    6.85  )-----------( 203      ( 24 Jan 2022 01:16 )             25.2     Medications  MEDICATIONS  (STANDING):  atorvastatin 80 milliGRAM(s) Oral at bedtime  carbidopa/levodopa  25/100 2.5 Tablet(s) Oral <User Schedule>  carbidopa/levodopa  25/100 2 Tablet(s) Oral <User Schedule>  chlorhexidine 4% Liquid 1 Application(s) Topical <User Schedule>  cinacalcet 60 milliGRAM(s) Oral daily  dextrose 40% Gel 15 Gram(s) Oral once  dextrose 5%. 1000 milliLiter(s) (100 mL/Hr) IV Continuous <Continuous>  dextrose 5%. 1000 milliLiter(s) (50 mL/Hr) IV Continuous <Continuous>  dextrose 50% Injectable 25 Gram(s) IV Push once  dextrose 50% Injectable 12.5 Gram(s) IV Push once  dextrose 50% Injectable 25 Gram(s) IV Push once  diVALproex Sprinkle 250 milliGRAM(s) Oral three times a day  DULoxetine 20 milliGRAM(s) Oral daily  folic acid 1 milliGRAM(s) Oral daily  glucagon  Injectable 1 milliGRAM(s) IntraMuscular once  heparin   Injectable 5000 Unit(s) SubCutaneous every 12 hours  insulin glargine Injectable (LANTUS) 6 Unit(s) SubCutaneous at bedtime  insulin lispro (ADMELOG) corrective regimen sliding scale   SubCutaneous every 6 hours  levothyroxine 25 MICROGram(s) Oral daily  memantine 5 milliGRAM(s) Oral at bedtime  metoprolol tartrate 50 milliGRAM(s) Oral two times a day  midodrine 10 milliGRAM(s) Oral <User Schedule>  mirtazapine 7.5 milliGRAM(s) Oral daily  Nephro-celeste 1 Tablet(s) Oral daily  pantoprazole   Suspension 40 milliGRAM(s) Enteral Tube two times a day  potassium phosphate / sodium phosphate Powder (PHOS-NaK) 1 Packet(s) Oral three times a day  trihexyphenidyl 2 milliGRAM(s) Oral three times a day      Physical Exam  General: Patient comfortable in bed  Vital Signs Last 12 Hrs  T(F): 97.5 (01-24-22 @ 11:18), Max: 98.4 (01-24-22 @ 04:51)  HR: 81 (01-24-22 @ 11:18) (67 - 81)  BP: 135/78 (01-24-22 @ 11:18) (135/78 - 153/71)  BP(mean): --  RR: 18 (01-24-22 @ 11:18) (18 - 18)  SpO2: 93% (01-24-22 @ 11:18) (93% - 97%)  Neck: No palpable thyroid nodules.  CVS: S1S2, No murmurs  Respiratory: No wheezing, no crepitations  GI: Abdomen soft, bowel sounds positive  Musculoskeletal:  edema lower extremities.   Skin: No skin rashes, no ecchymosis    Diagnostics

## 2022-01-24 NOTE — RAPID RESPONSE TEAM SUMMARY - NSSITUATIONBACKGROUNDRRT_GEN_ALL_CORE
70yo M w/ PMHx of CAD (s/p CABG 2019), CKD (unknown stage), DM2, Parkinson's Disease, HTN, depression presents with new onset acute heart failure exacerbation, NSTEMI, started on hep gtt, developed bl RP bleed, acute anemia. sp MICU course for hemorrhagic shock, 10/28 sp IR embolization l4 and l5 lumbar artery. sp permacath and AVF. Patient with prolonged hospital course. RRT called for altered mental status. Per primary team patient was last seen at baseline (A+Ox1) around 10 PM when evening meds administered. Of note, patient on seroquel 50 that was recently uptitrated. Patient somnolent on exam, arousable to noxious stimuli. VSS. Suspect multifactorial - oversedation in the setting of seroquel vs uremia (on HD M/W/F) vs hospital-delirium. Routine labs, ABG, cardiac enzymes ordered. Of note, patient with atrial flutter, not on a/c given hx of RP bleed. Will defer to primary team for a/c given patient is high risk. EKG redemonstrating atrial flutter without ischemic changes. CTH ordered, to be expedited by primary team. Neuro consult if mental status does not improve. Primary team at bedside - in agreement with plan.

## 2022-01-24 NOTE — CONSULT NOTE ADULT - SUBJECTIVE AND OBJECTIVE BOX
[facility], Division of Infectious Diseases  [address], [place] [address]  [phone]  ([phone] - weekdays after 5pm and weekends)    ORLIN HARDIN  71y, Male  [id number]    HPI--  HPI:  70yo M w/ PMHx of CAD (s/p CABG ), CKD (unknown stage), DM2, Parkinson's Disease, HTN, depression presents with bilateral leg swelling, patient's daughter in-law at bedside Yoly Hardin (4 [employer] [employer] [facility] Nurse) provides the history, the daughter reports the patient is a poor historian, unable to remember having conversations with others and likely cannot weigh risk/benefits of medical conditions, she reports that he has had bilateral lower extremity swelling for the past few months, but over the past 2 weeks it has significantly worsened, he has further difficulty ambulating due to swelling, his outpatient provider attempted to manage with 20mg lasix and then increased to 40mg lasix but the patient never took the increased dose, his symptoms ar persistent throughout the day and are extremely severe, he has developed wounds of the legs with weeping of fluid due to the swelling, she reports that the patient is frequently non-compliant with medications and medical management, he lives at home with his son and daughter in law, he denies chest pain, shortness of breath, fever/chills, cough, sputum, in the ED, he was tachycardic but hemodynamically stable, afebrile, saturating well on room air, labs were significant for elevated BNP, elevated creatinine, imaging showed moderate left pleural effusion, patient was admitted to general medicine for further management  (21 Oct 2021 07:06)  ID consulted for antibiotic recommendations and evaluate for UTI.    ROS: ROS limited 2/2 patient mentation    Allergies: adhesives (Rash)  latex (Urticaria)  No Known Drug Allergies    PMH -- Hypertension    Diabetes Mellitus, Type 2    Hypercholesterolemia    Parkinson disease    CAD (coronary artery disease)    Leg swelling    Autoimmune disease    Insomnia    Vertigo    Gastroesophageal reflux disease without esophagitis    Nocturia    Urinary incontinence    Urinary frequency    Panic attacks    Osteoarthritis    Shoulder pain, left      PSH -- Status Post Cardiac Catheterization    S/P angioplasty with stent    S/P CABG (coronary artery bypass graft)    S/P hip replacement, right    Anxiety    Depression, major      FH -- No significant family history    Family history of hypertension    Family history of malaria    Family history of pulmonary fibrosis (Sibling)      Social History -- denies tobacco, alcohol or illicit drug use  Travel/Environmental/Occupational History:     Physical Exam--  Vital Signs Last 24 Hrs  T(F): 97.5 (2022 11:18), Max: 98.4 (2022 04:51)  HR: 81 (2022 11:18) (67 - 93)  BP: 135/78 (2022 11:18) (135/78 - 155/88)  RR: 18 (2022 11:18) (18 - 18)  SpO2: 93% (2022 11:18) (93% - 97%)  General: nontoxic-appearing, no acute distress  HEENT: NC/AT, anicteric  Neck: Not rigid. No LAD  Lungs: Clear bilaterally without rales  Heart: Regular rate and rhythm. No murmur  Abdomen: Soft. Nondistended. Nontender, PEG  Back: No costovertebral angle tenderness.  Extremities: No cyanosis or clubbing. No edema.   Skin: Warm. Dry. Good turgor. No rash    Lines:    Laboratory & Imaging Data--  CBC:                       7.9    6.85  )-----------( 203      ( 2022 01:16 )             25.2     WBC Count: 6.85 K/uL (22 @ 01:16)  WBC Count: 8.09 K/uL (22 @ 11:07)  WBC Count: 9.81 K/uL (22 @ 10:11)  WBC Count: 9.65 K/uL (22 @ 08:46)  WBC Count: 10.33 K/uL (22 @ 06:06)  WBC Count: 8.62 K/uL (22 @ 05:51)  WBC Count: 8.69 K/uL (22 @ 06:07)    CMP:     126<L>  |  92<L>  |  68<H>  ----------------------------<  143<H>  3.8   |  24  |  3.25<H>    Ca    9.6      2022 01:17  Phos  1.6     01-24  Mg     2.1         TPro  7.0  /  Alb  1.7<L>  /  TBili  0.3  /  DBili  x   /  AST  18  /  ALT  <5<L>  /  AlkPhos  81      LIVER FUNCTIONS - ( 2022 01:17 )  Alb: 1.7 g/dL / Pro: 7.0 g/dL / ALK PHOS: 81 U/L / ALT: <5 U/L / AST: 18 U/L / GGT: x           Urinalysis Basic - ( 2022 17:14 )    Color: Yellow / Appearance: Slightly Turbid / S.010 / pH: x  Gluc: x / Ketone: Negative  / Bili: Negative / Urobili: Negative   Blood: x / Protein: 100 / Nitrite: Negative   Leuk Esterase: Large / RBC: 0 /hpf / WBC 1 /HPF   Sq Epi: x / Non Sq Epi: 0 /hpf / Bacteria: Negative      Microbiology:     Radiology--  ***  Active Medications--  acetaminophen     Tablet .. 650 milliGRAM(s) Oral every 6 hours PRN  albuterol/ipratropium for Nebulization 3 milliLiter(s) Nebulizer every 6 hours PRN  atorvastatin 80 milliGRAM(s) Oral at bedtime  carbidopa/levodopa  25/100 2.5 Tablet(s) Oral <User Schedule>  carbidopa/levodopa  25/100 2 Tablet(s) Oral <User Schedule>  chlorhexidine 4% Liquid 1 Application(s) Topical <User Schedule>  cinacalcet 60 milliGRAM(s) Oral daily  dextrose 40% Gel 15 Gram(s) Oral once  dextrose 5%. 1000 milliLiter(s) IV Continuous <Continuous>  dextrose 5%. 1000 milliLiter(s) IV Continuous <Continuous>  dextrose 50% Injectable 25 Gram(s) IV Push once  dextrose 50% Injectable 12.5 Gram(s) IV Push once  dextrose 50% Injectable 25 Gram(s) IV Push once  diVALproex Sprinkle 125 milliGRAM(s) Oral every 8 hours PRN  diVALproex Sprinkle 250 milliGRAM(s) Oral three times a day  DULoxetine 20 milliGRAM(s) Oral daily  folic acid 1 milliGRAM(s) Oral daily  glucagon  Injectable 1 milliGRAM(s) IntraMuscular once  guaiFENesin Oral Liquid (Sugar-Free) 200 milliGRAM(s) Oral every 6 hours PRN  heparin   Injectable 5000 Unit(s) SubCutaneous every 12 hours  insulin glargine Injectable (LANTUS) 6 Unit(s) SubCutaneous at bedtime  insulin lispro (ADMELOG) corrective regimen sliding scale   SubCutaneous every 6 hours  levothyroxine 25 MICROGram(s) Oral daily  memantine 5 milliGRAM(s) Oral at bedtime  metoprolol tartrate 50 milliGRAM(s) Oral two times a day  midodrine 10 milliGRAM(s) Oral <User Schedule>  mirtazapine 7.5 milliGRAM(s) Oral daily  Nephro-celeste 1 Tablet(s) Oral daily  ondansetron Injectable 4 milliGRAM(s) IV Push once PRN  pantoprazole   Suspension 40 milliGRAM(s) Enteral Tube two times a day  potassium phosphate / sodium phosphate Powder (PHOS-NaK) 1 Packet(s) Oral three times a day  QUEtiapine 12.5 milliGRAM(s) Oral every 6 hours PRN  sodium chloride 0.9% lock flush 10 milliLiter(s) IV Push every 1 hour PRN  trihexyphenidyl 2 milliGRAM(s) Oral three times a day    Antimicrobials:     Immunologic:

## 2022-01-24 NOTE — PROGRESS NOTE ADULT - SUBJECTIVE AND OBJECTIVE BOX
Patient is a 71y Male     Patient is a 71y old  Male who presents with a chief complaint of leg swelling (23 Jan 2022 15:47)      HPI:  70yo M w/ PMHx of CAD (s/p CABG 2019), CKD (unknown stage), DM2, Parkinson's Disease, HTN, depression presents with bilateral leg swelling, patient's daughter in-law at bedside Yoly Quintero ([facility] [place] [facility]) provides the history, the daughter reports the patient is a poor historian, unable to remember having conversations with others and likely cannot weigh risk/benefits of medical conditions, she reports that he has had bilateral lower extremity swelling for the past few months, but over the past 2 weeks it has significantly worsened, he has further difficulty ambulating due to swelling, his outpatient provider attempted to manage with 20mg lasix and then increased to 40mg lasix but the patient never took the increased dose, his symptoms ar persistent throughout the day and are extremely severe, he has developed wounds of the legs with weeping of fluid due to the swelling, she reports that the patient is frequently non-compliant with medications and medical management, he lives at home with his son and daughter in law, he denies chest pain, shortness of breath, fever/chills, cough, sputum, in the ED, he was tachycardic but hemodynamically stable, afebrile, saturating well on room air, labs were significant for elevated BNP, elevated creatinine, imaging showed moderate left pleural effusion, patient was admitted to general medicine for further management  (21 Oct 2021 07:06)      PAST MEDICAL & SURGICAL HISTORY:  Hypertension    Diabetes Mellitus, Type 2    Hypercholesterolemia    Parkinson disease    CAD (coronary artery disease)  s/p 1 stent in 2010 and CABG 2019    S/P CABG (coronary artery bypass graft)  2019    S/P hip replacement, right  2016    Anxiety    Depression, major        MEDICATIONS  (STANDING):  atorvastatin 80 milliGRAM(s) Oral at bedtime  carbidopa/levodopa  25/100 2.5 Tablet(s) Oral <User Schedule>  carbidopa/levodopa  25/100 2 Tablet(s) Oral <User Schedule>  chlorhexidine 4% Liquid 1 Application(s) Topical <User Schedule>  cinacalcet 60 milliGRAM(s) Oral daily  dextrose 40% Gel 15 Gram(s) Oral once  dextrose 5%. 1000 milliLiter(s) (100 mL/Hr) IV Continuous <Continuous>  dextrose 5%. 1000 milliLiter(s) (50 mL/Hr) IV Continuous <Continuous>  dextrose 50% Injectable 25 Gram(s) IV Push once  dextrose 50% Injectable 12.5 Gram(s) IV Push once  dextrose 50% Injectable 25 Gram(s) IV Push once  diVALproex Sprinkle 250 milliGRAM(s) Oral three times a day  DULoxetine 20 milliGRAM(s) Oral daily  folic acid 1 milliGRAM(s) Oral daily  glucagon  Injectable 1 milliGRAM(s) IntraMuscular once  heparin   Injectable 5000 Unit(s) SubCutaneous every 12 hours  insulin glargine Injectable (LANTUS) 6 Unit(s) SubCutaneous at bedtime  insulin lispro (ADMELOG) corrective regimen sliding scale   SubCutaneous every 6 hours  levothyroxine 25 MICROGram(s) Oral daily  memantine 5 milliGRAM(s) Oral at bedtime  metoprolol tartrate 50 milliGRAM(s) Oral two times a day  midodrine 10 milliGRAM(s) Oral <User Schedule>  mirtazapine 7.5 milliGRAM(s) Oral daily  Nephro-celeste 1 Tablet(s) Oral daily  pantoprazole   Suspension 40 milliGRAM(s) Enteral Tube two times a day  QUEtiapine 50 milliGRAM(s) Oral at bedtime  trihexyphenidyl 2 milliGRAM(s) Oral three times a day      Allergies    adhesives (Rash)  latex (Urticaria)  No Known Drug Allergies    Intolerances        SOCIAL HISTORY:  Denies ETOh,Smoking,     FAMILY HISTORY:  Family history of hypertension    Family history of malaria    Family history of pulmonary fibrosis (Sibling)        REVIEW OF SYSTEMS:    CONSTITUTIONAL: No weakness, fevers or chills  EYES/ENT: No visual changes;  No vertigo or throat pain   NECK: No pain or stiffness  RESPIRATORY: No cough, wheezing, hemoptysis; No shortness of breath  CARDIOVASCULAR: No chest pain or palpitations  GASTROINTESTINAL: No abdominal or epigastric pain. No nausea, vomiting, or hematemesis; No diarrhea or constipation. No melena or hematochezia.  GENITOURINARY: No dysuria, frequency or hematuria  NEUROLOGICAL: No numbness or weakness  SKIN: No itching, burning, rashes, or lesions   All other review of systems is negative unless indicated above.    VITAL:  T(C): , Max: 36.9 (01-24-22 @ 04:51)  T(F): , Max: 98.4 (01-24-22 @ 04:51)  HR: 67 (01-24-22 @ 04:51)  BP: 153/71 (01-24-22 @ 04:51)  BP(mean): --  RR: 18 (01-24-22 @ 04:51)  SpO2: 97% (01-24-22 @ 04:51)  Wt(kg): --    I and O's:    01-22 @ 07:01  -  01-23 @ 07:00  --------------------------------------------------------  IN: 661 mL / OUT: 1000 mL / NET: -339 mL    01-23 @ 07:01  -  01-24 @ 07:00  --------------------------------------------------------  IN: 0 mL / OUT: 1050 mL / NET: -1050 mL          PHYSICAL EXAM:    Constitutional: NAD  HEENT: PERRLA,   Neck: No JVD  Respiratory: CTA B/L  Cardiovascular: S1 and S2  Gastrointestinal: BS+, soft, NT/ND  Extremities: No peripheral edema  Neurological: A/O x 3, no focal deficits  Psychiatric: Normal mood, normal affect  : No Javier  Skin: No rashes  Access: Not applicable  Back: No CVA tenderness    LABS:                        7.9    6.85  )-----------( 203      ( 24 Jan 2022 01:16 )             25.2     01-24    126<L>  |  92<L>  |  68<H>  ----------------------------<  143<H>  3.8   |  24  |  3.25<H>    Ca    9.6      24 Jan 2022 01:17  Phos  1.6     01-24  Mg     2.1     01-24    TPro  7.0  /  Alb  1.7<L>  /  TBili  0.3  /  DBili  x   /  AST  18  /  ALT  <5<L>  /  AlkPhos  81  01-24          RADIOLOGY & ADDITIONAL STUDIES:

## 2022-01-24 NOTE — CONSULT NOTE ADULT - ASSESSMENT
72 y/o M w/ PMHx of CAD (s/p CABG 2019), CKD (unknown stage), DM2, Parkinson's Disease, HTN, depression who presented with ADHF, NSTEMI s/p hep gtt c/b RP bleed, MICU course for hemorrhagic shock s/p IR embolization. Rapid response overnight     asymptomatic bacteriuria  alert and able to answer most questions appropriately though confused  pt denies urinary symptoms  has remained afebrile and without leukocytosis  hold off antibiotics at this time  continue to monitor symptoms      Alberto Rojo M.D.  [facility], Division of Infectious Diseases  [phone]  After 5pm on weekdays and all day on weekends - please call [phone]

## 2022-01-25 NOTE — CHART NOTE - NSCHARTNOTEFT_GEN_A_CORE
Nutrition Follow Up Note  Patient seen for: malnutrition follow-up + consult for tube feeding. Chart reviewed, events noted.     72yo M w/ PMHx of CAD (s/p CABG ), CKD (unknown stage), DM2, Parkinson's Disease, HTN, depression presents with new onset acute heart failure exacerbation, NSTEMI, started on hep gtt, developed bl RP bleed, acute anemia. sp MICU course for hemorrhagic shock, 10/28 sp IR embolization l4 and l5 lumbar artery. sp permacath and AVF.    Source: [] Patient       [x] Medical Record        [x] RN        [] Family at bedside       [] Other:    -If unable to interview patient: [] Trach/Vent/BiPAP  [x] Disoriented/confused/inappropriate to interview    Diet Order:   Diet, NPO with Tube Feed:   Tube Feeding Modality: Gastrostomy  Nepro with Carb Steady (NEPRORTH)  Total Volume for 24 Hours (mL): 1980  Bolus  Total Volume of Bolus (mL):  330  Total # of Feeds: 4  Tube Feed Frequency: Every 4 hours   Tube Feed Start Time: 16:00  Bolus Feed Rate (mL per Hour): 330   Bolus Feed Duration (in Hours): 1  Bolus Feed Instructions:  Nepro start @ 120 ml bolus, increase 120 ml per bolus as tolerated to GOAL bolus 330 ml 4x daily (08:00, 12:00, 16:00, 20:00).  Free Water Flush Instructions:  defer to team (22)    - Is current order appropriate/adequate? [x] Yes      - So far two boluses administered today per RN, all day-shift boluses provided yesterday per RN.     - Nutrition-related concerns:      - Hypophosphatemia  and . S/p Phos-NaK powder -.       - vitamin D d/c'd       - RRT called overnight , pt disoriented, lethargic.      - s/p PEG 1/10,      - Per chart, pt ordered for SSI admelog, sinemet, Synthroid (IV), remeron, Nephro-celeste, folic acid, and Vitamin D3      - Per chart, Sinemet is ordered for 06:00, 14:00, & 22:00.    GI:  Last BM  per RN x2.   Bowel Regimen? No  : bowel regimen d/c'd due to loose stool       Weights:   Daily Weight in k.8 (), Weight in k.8 (), Weight in k.8 (), Weight in k.8 (), Weight in k.6 (), Weight in k.3 (), Weight in k.3 (), 67.6 (-16), 64.1 (), 64.1 (), 61.5 (01-10), 66.6 (), 67.1 (), 69.3 (-),  69.8 (), 68.6 (),  68.6 (),  70.4 (-),  71 (-),  71 (-).  Dosing wt: 96.4 kg (-14)  Suspect wt changes due to HD fluid shifts. Noted with progressive wt decline, likely 2/2 poor PO intake and prolonged hospital course. RD to continue to monitor weight trends as able/available.     Nutritionally Pertinent MEDICATIONS  (STANDING):  atorvastatin  cinacalcet  dextrose 40% Gel  dextrose 5%.  dextrose 5%.  dextrose 50% Injectable  dextrose 50% Injectable  dextrose 50% Injectable  folic acid  glucagon  Injectable  insulin glargine Injectable (LANTUS)  insulin lispro (ADMELOG) corrective regimen sliding scale  levothyroxine  metoprolol tartrate  midodrine  Nephro-celeste  pantoprazole   Suspension    Pertinent Labs:  @ 04:29: Na 128<L>, BUN 86<H>, Cr 4.04<H>, <H>, K+ 3.9, Phos 2.8, Mg --, Alk Phos 88, ALT/SGPT <5<L>, AST/SGOT 17, HbA1c --    A1C with Estimated Average Glucose Result: 5.8 % (22 @ 09:27)  A1C with Estimated Average Glucose Result: 6.4 % (10-22-21 @ 08:56)    Finger Sticks:  POCT Blood Glucose.: 80 mg/dL ( @ 13:00)  POCT Blood Glucose.: 108 mg/dL ( @ 06:12)  POCT Blood Glucose.: 204 mg/dL ( @ 23:58)  POCT Blood Glucose.: 88 mg/dL ( @ 17:52)      Skin per nursing documentation: No pressure injuries noted.  Edema per nursing documentation: 1+ b/l ankle, 2+ L arm    Estimated Needs:   [x] no change since previous assessment  9676-2306 kcal/day (30-35 kcal/kg)   gm protein/day (1.2-1.4 g/kg)  Defer fluid needs to team  based on IBW 78 kg    Previous Nutrition Diagnosis: Increased Nutrient Needs; Food and Nutrition Related Knowledge Deficit;  Severe acute on chronic malnutrition    Nutrition Diagnosis is: [x] ongoing  [] resolved [] not applicable   Being addressed with tube feeding regimen via PEG and micronutrient supplementation    Nutrition Care Plan:  [x] In Progress  [] Achieved  [] Not applicable    New Nutrition Diagnosis: [x] Not applicable    Nutrition Interventions:     Education Provided   [x] No: Not appropriate at this time.    Recommendations:      1) Recommend continue Nepro bolus @ 330 ml 4x daily (08:00, 12:00, 16:00, 20:00) with consideration for Sinemet administration. Defer free water flushes to team.     -Total regimen to provide 1320 ml total volume, 2347 kcal, 107 g pro, 960 ml free water.     -Provides 30 kcal/kg, 1.4 g pro/kg based on IBW 78 considering wt fluctuations. Meets >100% RDIs.  2) Please check phos daily. If Phos is consistently low, can consider changing TF formula  3) Continue Nephro-celeste and Folic acid if no medical contraindications as medically appropriate.  4) Monitor EN tolerance, skin integrity, labs, weight.  5) RD remains available to adjust EN Regimen as needed.    Monitoring and Evaluation:   Continue to monitor nutritional intake, tolerance to diet prescription, weights, labs, skin integrity    RD remains available upon request and will follow up per protocol  Jada Hou RD, CDN. Pager: [phone] Nutrition Follow Up Note  Patient seen for: malnutrition follow-up     Chart reviewed, events noted.   72yo M w/ PMHx of CAD (s/p CABG ), CKD (unknown stage), DM2, Parkinson's Disease, HTN, depression presents with new onset acute heart failure exacerbation, NSTEMI, started on hep gtt, developed bl RP bleed, acute anemia. sp MICU course for hemorrhagic shock, 10/28 sp IR embolization l4 and l5 lumbar artery. sp permacath and AVF.    Source: [] Patient       [x] Medical Record        [x] RN        [] Family at bedside       [] Other:    -If unable to interview patient: [] Trach/Vent/BiPAP  [x] Disoriented/confused/inappropriate to interview    Diet Order:   Diet, NPO with Tube Feed:   Tube Feeding Modality: Gastrostomy  Nepro with Carb Steady (NEPRORTH)  Total Volume for 24 Hours (mL): 1980  Bolus  Total Volume of Bolus (mL):  330  Total # of Feeds: 4  Tube Feed Frequency: Every 4 hours   Tube Feed Start Time: 16:00  Bolus Feed Rate (mL per Hour): 330   Bolus Feed Duration (in Hours): 1  Bolus Feed Instructions:  Nepro start @ 120 ml bolus, increase 120 ml per bolus as tolerated to GOAL bolus 330 ml 4x daily (08:00, 12:00, 16:00, 20:00).  Free Water Flush Instructions:  defer to team (22)    - Is current order appropriate/adequate? [x] Yes      - So far two boluses administered today per RN, all day-shift boluses provided yesterday per RN.     - Nutrition-related concerns:      - Hypophosphatemia  and . S/p Phos-NaK powder -.       - vitamin D d/c'd       - RRT called overnight , pt disoriented, lethargic.      - s/p PEG 1/10,      - Per chart, pt ordered for SSI admelog, sinemet, Synthroid (IV), remeron, Nephro-celeste, folic acid, and Vitamin D3      - Per chart, Sinemet is ordered for 06:00, 14:00, & 22:00.    GI:  Last BM  per RN x2.   Bowel Regimen? No  : bowel regimen d/c'd due to loose stool       Weights:   Daily Weight in k.8 (), Weight in k.8 (), Weight in k.8 (), Weight in k.8 (), Weight in k.6 (), Weight in k.3 (), Weight in k.3 (), 67.6 (-16), 64.1 (), 64.1 (), 61.5 (01-10), 66.6 (-), 67.1 (), 69.3 (-),  69.8 (), 68.6 (),  68.6 (),  70.4 (-),  71 (-),  71 (-).  Dosing wt: 96.4 kg (-14)  Suspect wt changes due to HD fluid shifts. Noted with progressive wt decline, likely 2/2 poor PO intake and prolonged hospital course. RD to continue to monitor weight trends as able/available.     Nutritionally Pertinent MEDICATIONS  (STANDING):  atorvastatin  cinacalcet  dextrose 40% Gel  dextrose 5%.  dextrose 5%.  dextrose 50% Injectable  dextrose 50% Injectable  dextrose 50% Injectable  folic acid  glucagon  Injectable  insulin glargine Injectable (LANTUS)  insulin lispro (ADMELOG) corrective regimen sliding scale  levothyroxine  metoprolol tartrate  midodrine  Nephro-celeste  pantoprazole   Suspension    Pertinent Labs:  @ 04:29: Na 128<L>, BUN 86<H>, Cr 4.04<H>, <H>, K+ 3.9, Phos 2.8, Mg --, Alk Phos 88, ALT/SGPT <5<L>, AST/SGOT 17, HbA1c --    A1C with Estimated Average Glucose Result: 5.8 % (22 @ 09:27)  A1C with Estimated Average Glucose Result: 6.4 % (10-22-21 @ 08:56)    Finger Sticks:  POCT Blood Glucose.: 80 mg/dL ( @ 13:00)  POCT Blood Glucose.: 108 mg/dL ( @ 06:12)  POCT Blood Glucose.: 204 mg/dL ( @ 23:58)  POCT Blood Glucose.: 88 mg/dL ( @ 17:52)      Skin per nursing documentation: No pressure injuries noted.  Edema per nursing documentation: 1+ b/l ankle, 2+ L arm    Estimated Needs:   [x] no change since previous assessment  6789-1215 kcal/day (30-35 kcal/kg)   gm protein/day (1.2-1.4 g/kg)  Defer fluid needs to team  based on IBW 78 kg    Previous Nutrition Diagnosis: Increased Nutrient Needs; Food and Nutrition Related Knowledge Deficit;  Severe acute on chronic malnutrition    Nutrition Diagnosis is: [x] ongoing  [] resolved [] not applicable   Being addressed with tube feeding regimen via PEG and micronutrient supplementation    Nutrition Care Plan:  [x] In Progress  [] Achieved  [] Not applicable    New Nutrition Diagnosis: [x] Not applicable    Nutrition Interventions:     Education Provided   [x] No: Not appropriate at this time.    Recommendations:      1) Recommend continue Nepro bolus @ 330 ml 4x daily (08:00, 12:00, 16:00, 20:00) with consideration for Sinemet administration. Defer free water flushes to team.     -Total regimen to provide 1320 ml total volume, 2347 kcal, 107 g pro, 960 ml free water.     -Provides 30 kcal/kg, 1.4 g pro/kg based on IBW 78 considering wt fluctuations. Meets >100% RDIs.  2) Please check phos daily. If Phos is consistently low, can consider changing TF formula  3) Continue Nephro-celeste and Folic acid if no medical contraindications as medically appropriate.  4) Monitor EN tolerance, skin integrity, labs, weight.  5) RD remains available to adjust EN Regimen as needed.    Monitoring and Evaluation:   Continue to monitor nutritional intake, tolerance to diet prescription, weights, labs, skin integrity    RD remains available upon request and will follow up per protocol  Jada Hou RD, CDN. Pager: [phone]

## 2022-01-25 NOTE — PROGRESS NOTE ADULT - SUBJECTIVE AND OBJECTIVE BOX
Patient is a 71y old  Male who presents with a chief complaint of leg swelling (2022 15:01)      SUBJECTIVE / OVERNIGHT EVENTS:    Patient seen and examined. awake and alert. confused.      Vital Signs Last 24 Hrs  T(C): 36.6 (2022 12:18), Max: 36.8 (2022 04:31)  T(F): 97.9 (2022 12:18), Max: 98.3 (2022 04:31)  HR: 101 (2022 12:18) (85 - 107)  BP: 137/84 (2022 12:18) (132/77 - 149/83)  BP(mean): --  RR: 18 (2022 12:18) (18 - 18)  SpO2: 97% (2022 12:18) (93% - 97%)  I&O's Summary    2022 07:01  -  2022 07:00  --------------------------------------------------------  IN: 0 mL / OUT: 1100 mL / NET: -1100 mL    2022 07:01  -  2022 15:05  --------------------------------------------------------  IN: 0 mL / OUT: 1000 mL / NET: -1000 mL        PE:  GENERAL: NAD, AAOx3  HEAD:  Atraumatic, Normocephalic  EYES: EOMI, PERRLA, conjunctiva and sclera clear  NECK: Supple, No JVD  CHEST/LUNG: CTABL, No wheeze  HEART: Regular rate and rhythm; + murmur  ABDOMEN: Soft, Nontender, Nondistended; Bowel sounds present  EXTREMITIES:  2+ Peripheral Pulses, No clubbing, cyanosis, or edema  SKIN: No rashes or lesions  NEURO: No focal deficits    LABS:                        8.7    6.93  )-----------( 264      ( 2022 04:29 )             27.8         128<L>  |  89<L>  |  86<H>  ----------------------------<  116<H>  3.9   |  28  |  4.04<H>    Ca    10.0      2022 04:29  Phos  2.8       Mg     2.1         TPro  7.6  /  Alb  2.3<L>  /  TBili  0.3  /  DBili  x   /  AST  17  /  ALT  <5<L>  /  AlkPhos  88        CAPILLARY BLOOD GLUCOSE      POCT Blood Glucose.: 80 mg/dL (2022 13:00)  POCT Blood Glucose.: 108 mg/dL (2022 06:12)  POCT Blood Glucose.: 204 mg/dL (2022 23:58)  POCT Blood Glucose.: 88 mg/dL (2022 17:52)    CARDIAC MARKERS ( 2022 01:17 )  x     / x     / 11 U/L / x     / 1.8 ng/mL      Urinalysis Basic - ( 2022 18:49 )    Color: Yellow / Appearance: Slightly Turbid / S.008 / pH: x  Gluc: x / Ketone: Negative  / Bili: Negative / Urobili: Negative   Blood: x / Protein: 100 / Nitrite: Negative   Leuk Esterase: Large / RBC: 21 /hpf / WBC >50 /HPF   Sq Epi: x / Non Sq Epi: 1 / Bacteria: Many        RADIOLOGY & ADDITIONAL TESTS:    Imaging Personally Reviewed:  [x] YES  [ ] NO    Consultant(s) Notes Reviewed:  [x] YES  [ ] NO    MEDICATIONS  (STANDING):  atorvastatin 80 milliGRAM(s) Oral at bedtime  carbidopa/levodopa  25/100 2.5 Tablet(s) Oral <User Schedule>  carbidopa/levodopa  25/100 2 Tablet(s) Oral <User Schedule>  chlorhexidine 4% Liquid 1 Application(s) Topical <User Schedule>  cinacalcet 60 milliGRAM(s) Oral daily  dextrose 40% Gel 15 Gram(s) Oral once  dextrose 5%. 1000 milliLiter(s) (50 mL/Hr) IV Continuous <Continuous>  dextrose 5%. 1000 milliLiter(s) (100 mL/Hr) IV Continuous <Continuous>  dextrose 50% Injectable 25 Gram(s) IV Push once  dextrose 50% Injectable 12.5 Gram(s) IV Push once  dextrose 50% Injectable 25 Gram(s) IV Push once  diVALproex Sprinkle 250 milliGRAM(s) Oral three times a day  DULoxetine 20 milliGRAM(s) Oral daily  folic acid 1 milliGRAM(s) Oral daily  glucagon  Injectable 1 milliGRAM(s) IntraMuscular once  heparin   Injectable 5000 Unit(s) SubCutaneous every 12 hours  insulin glargine Injectable (LANTUS) 6 Unit(s) SubCutaneous at bedtime  insulin lispro (ADMELOG) corrective regimen sliding scale   SubCutaneous every 6 hours  levothyroxine 25 MICROGram(s) Oral daily  memantine 5 milliGRAM(s) Oral at bedtime  metoprolol tartrate 50 milliGRAM(s) Oral two times a day  midodrine 10 milliGRAM(s) Oral <User Schedule>  mirtazapine 7.5 milliGRAM(s) Oral daily  Nephro-celeste 1 Tablet(s) Oral daily  pantoprazole   Suspension 40 milliGRAM(s) Enteral Tube two times a day  trihexyphenidyl 2 milliGRAM(s) Oral three times a day    MEDICATIONS  (PRN):  acetaminophen     Tablet .. 650 milliGRAM(s) Oral every 6 hours PRN Mild Pain (1 - 3)  albuterol/ipratropium for Nebulization 3 milliLiter(s) Nebulizer every 6 hours PRN Shortness of Breath and/or Wheezing  diVALproex Sprinkle 125 milliGRAM(s) Oral every 8 hours PRN Agitation  guaiFENesin Oral Liquid (Sugar-Free) 200 milliGRAM(s) Oral every 6 hours PRN Cough  ondansetron Injectable 4 milliGRAM(s) IV Push once PRN Nausea and/or Vomiting  QUEtiapine 12.5 milliGRAM(s) Oral every 6 hours PRN agitation  sodium chloride 0.9% lock flush 10 milliLiter(s) IV Push every 1 hour PRN Pre/post blood products, medications, blood draw, and to maintain line patency      Care Discussed with Consultants/Other Providers [x] YES  [ ] NO    HEALTH ISSUES - PROBLEM Dx:  Acute heart failure    Atrial flutter    DM2 (diabetes mellitus, type 2)    CAD (coronary artery disease)    Parkinsons disease    Acute on chronic renal failure    NSTEMI (non-ST elevation myocardial infarction)    Hypertension    Acute kidney injury superimposed on CKD    Anemia    Hypothyroidism    Delirium    Advanced care planning/counseling discussion    Palliative care status    Palliative care encounter    Pain    Stage 5 chronic kidney disease not on chronic dialysis    Hypercalcemia    Preop cardiovascular exam         Patient is a 71y old  Male who presents with a chief complaint of leg swelling (2022 15:01)      SUBJECTIVE / OVERNIGHT EVENTS:    Patient seen and examined. awake and alert. confused.      Vital Signs Last 24 Hrs  T(C): 36.6 (2022 12:18), Max: 36.8 (2022 04:31)  T(F): 97.9 (2022 12:18), Max: 98.3 (2022 04:31)  HR: 101 (2022 12:18) (85 - 107)  BP: 137/84 (2022 12:18) (132/77 - 149/83)  BP(mean): --  RR: 18 (2022 12:18) (18 - 18)  SpO2: 97% (2022 12:18) (93% - 97%)  I&O's Summary    2022 07:01  -  2022 07:00  --------------------------------------------------------  IN: 0 mL / OUT: 1100 mL / NET: -1100 mL    2022 07:01  -  2022 15:05  --------------------------------------------------------  IN: 0 mL / OUT: 1000 mL / NET: -1000 mL        PE:  GENERAL: awake alert confused at times  HEAD:  Atraumatic, Normocephalic  NECK:  No JVD  CHEST/LUNG: CTABL, No wheeze, right chest wall permacath  HEART: Regular rate and rhythm; no murmur  ABDOMEN: Soft, Nontender, Nondistended; Bowel sounds present, peg  EXTREMITIES:  2+ Peripheral Pulses, trace le edema, left arm avf  NEURO: no acute deficits    LABS:                        8.7    6.93  )-----------( 264      ( 2022 04:29 )             27.8         128<L>  |  89<L>  |  86<H>  ----------------------------<  116<H>  3.9   |  28  |  4.04<H>    Ca    10.0      2022 04:29  Phos  2.8       Mg     2.1         TPro  7.6  /  Alb  2.3<L>  /  TBili  0.3  /  DBili  x   /  AST  17  /  ALT  <5<L>  /  AlkPhos  88        CAPILLARY BLOOD GLUCOSE      POCT Blood Glucose.: 80 mg/dL (2022 13:00)  POCT Blood Glucose.: 108 mg/dL (2022 06:12)  POCT Blood Glucose.: 204 mg/dL (2022 23:58)  POCT Blood Glucose.: 88 mg/dL (2022 17:52)    CARDIAC MARKERS ( 2022 01:17 )  x     / x     / 11 U/L / x     / 1.8 ng/mL      Urinalysis Basic - ( 2022 18:49 )    Color: Yellow / Appearance: Slightly Turbid / S.008 / pH: x  Gluc: x / Ketone: Negative  / Bili: Negative / Urobili: Negative   Blood: x / Protein: 100 / Nitrite: Negative   Leuk Esterase: Large / RBC: 21 /hpf / WBC >50 /HPF   Sq Epi: x / Non Sq Epi: 1 / Bacteria: Many        RADIOLOGY & ADDITIONAL TESTS:    Imaging Personally Reviewed:  [x] YES  [ ] NO    Consultant(s) Notes Reviewed:  [x] YES  [ ] NO    MEDICATIONS  (STANDING):  atorvastatin 80 milliGRAM(s) Oral at bedtime  carbidopa/levodopa  25/100 2.5 Tablet(s) Oral <User Schedule>  carbidopa/levodopa  25/100 2 Tablet(s) Oral <User Schedule>  chlorhexidine 4% Liquid 1 Application(s) Topical <User Schedule>  cinacalcet 60 milliGRAM(s) Oral daily  dextrose 40% Gel 15 Gram(s) Oral once  dextrose 5%. 1000 milliLiter(s) (50 mL/Hr) IV Continuous <Continuous>  dextrose 5%. 1000 milliLiter(s) (100 mL/Hr) IV Continuous <Continuous>  dextrose 50% Injectable 25 Gram(s) IV Push once  dextrose 50% Injectable 12.5 Gram(s) IV Push once  dextrose 50% Injectable 25 Gram(s) IV Push once  diVALproex Sprinkle 250 milliGRAM(s) Oral three times a day  DULoxetine 20 milliGRAM(s) Oral daily  folic acid 1 milliGRAM(s) Oral daily  glucagon  Injectable 1 milliGRAM(s) IntraMuscular once  heparin   Injectable 5000 Unit(s) SubCutaneous every 12 hours  insulin glargine Injectable (LANTUS) 6 Unit(s) SubCutaneous at bedtime  insulin lispro (ADMELOG) corrective regimen sliding scale   SubCutaneous every 6 hours  levothyroxine 25 MICROGram(s) Oral daily  memantine 5 milliGRAM(s) Oral at bedtime  metoprolol tartrate 50 milliGRAM(s) Oral two times a day  midodrine 10 milliGRAM(s) Oral <User Schedule>  mirtazapine 7.5 milliGRAM(s) Oral daily  Nephro-celeste 1 Tablet(s) Oral daily  pantoprazole   Suspension 40 milliGRAM(s) Enteral Tube two times a day  trihexyphenidyl 2 milliGRAM(s) Oral three times a day    MEDICATIONS  (PRN):  acetaminophen     Tablet .. 650 milliGRAM(s) Oral every 6 hours PRN Mild Pain (1 - 3)  albuterol/ipratropium for Nebulization 3 milliLiter(s) Nebulizer every 6 hours PRN Shortness of Breath and/or Wheezing  diVALproex Sprinkle 125 milliGRAM(s) Oral every 8 hours PRN Agitation  guaiFENesin Oral Liquid (Sugar-Free) 200 milliGRAM(s) Oral every 6 hours PRN Cough  ondansetron Injectable 4 milliGRAM(s) IV Push once PRN Nausea and/or Vomiting  QUEtiapine 12.5 milliGRAM(s) Oral every 6 hours PRN agitation  sodium chloride 0.9% lock flush 10 milliLiter(s) IV Push every 1 hour PRN Pre/post blood products, medications, blood draw, and to maintain line patency      Care Discussed with Consultants/Other Providers [x] YES  [ ] NO    HEALTH ISSUES - PROBLEM Dx:  Acute heart failure    Atrial flutter    DM2 (diabetes mellitus, type 2)    CAD (coronary artery disease)    Parkinsons disease    Acute on chronic renal failure    NSTEMI (non-ST elevation myocardial infarction)    Hypertension    Acute kidney injury superimposed on CKD    Anemia    Hypothyroidism    Delirium    Advanced care planning/counseling discussion    Palliative care status    Palliative care encounter    Pain    Stage 5 chronic kidney disease not on chronic dialysis    Hypercalcemia    Preop cardiovascular exam

## 2022-01-25 NOTE — PROGRESS NOTE ADULT - SUBJECTIVE AND OBJECTIVE BOX
[facility], Division of Infectious Diseases  [address], S. Shah, G. Cas[place]  [phone]  ([phone] - weekdays after 5pm and weekends)    Name: ORLIN HARDIN  Age/Gender: 71y Male  MRN: [id number]    Interval History:  Patient seen this morning.   Notes reviewed.   No concerning overnight events.  Afebrile.   Continues to deny urinary symptoms    Allergies: adhesives (Rash)  latex (Urticaria)  No Known Drug Allergies      Objective:  Vitals:   T(F): 97.9 (22 @ 12:18), Max: 98.3 (22 @ 04:31)  HR: 101 (22 @ 12:18) (85 - 107)  BP: 137/84 (22 @ 12:18) (132/77 - 149/83)  RR: 18 (22 @ 12:18) (18 - 18)  SpO2: 97% (22 @ 12:18) (93% - 97%)  Physical Examination:  General: no acute distress, nontoxic appearing  HEENT: anicteric  Cardio: S1, S2   Resp: breathing comfortably on RA  Abd: soft, ND, NT, PEG tube  Ext: no edema  Skin: warm, dry, no visible rash   Lines:    Laboratory Studies:  CBC:                       8.7    6.93  )-----------( 264      ( 2022 04:29 )             27.8     WBC Trend:  6.93 22 @ 04:29  6.85 22 @ 01:16  8.09 22 @ 11:07  9.81 22 @ 10:11  9.65 22 @ 08:46  10.33 22 @ 06:06  8.62 22 @ 05:51    CMP:     128<L>  |  89<L>  |  86<H>  ----------------------------<  116<H>  3.9   |  28  |  4.04<H>    Ca    10.0      2022 04:29  Phos  2.8       Mg     2.1         TPro  7.6  /  Alb  2.3<L>  /  TBili  0.3  /  DBili  x   /  AST  17  /  ALT  <5<L>  /  AlkPhos  88        LIVER FUNCTIONS - ( 2022 04:29 )  Alb: 2.3 g/dL / Pro: 7.6 g/dL / ALK PHOS: 88 U/L / ALT: <5 U/L / AST: 17 U/L / GGT: x             Urinalysis Basic - ( 2022 18:49 )    Color: Yellow / Appearance: Slightly Turbid / S.008 / pH: x  Gluc: x / Ketone: Negative  / Bili: Negative / Urobili: Negative   Blood: x / Protein: 100 / Nitrite: Negative   Leuk Esterase: Large / RBC: 21 /hpf / WBC >50 /HPF   Sq Epi: x / Non Sq Epi: 1 / Bacteria: Many      Microbiology: reviewed     Radiology: reviewed     Medications:  acetaminophen     Tablet .. 650 milliGRAM(s) Oral every 6 hours PRN  albuterol/ipratropium for Nebulization 3 milliLiter(s) Nebulizer every 6 hours PRN  atorvastatin 80 milliGRAM(s) Oral at bedtime  carbidopa/levodopa  25/100 2.5 Tablet(s) Oral <User Schedule>  carbidopa/levodopa  25/100 2 Tablet(s) Oral <User Schedule>  chlorhexidine 4% Liquid 1 Application(s) Topical <User Schedule>  cinacalcet 60 milliGRAM(s) Oral daily  dextrose 40% Gel 15 Gram(s) Oral once  dextrose 5%. 1000 milliLiter(s) IV Continuous <Continuous>  dextrose 5%. 1000 milliLiter(s) IV Continuous <Continuous>  dextrose 50% Injectable 25 Gram(s) IV Push once  dextrose 50% Injectable 12.5 Gram(s) IV Push once  dextrose 50% Injectable 25 Gram(s) IV Push once  diVALproex Sprinkle 125 milliGRAM(s) Oral every 8 hours PRN  diVALproex Sprinkle 250 milliGRAM(s) Oral three times a day  DULoxetine 20 milliGRAM(s) Oral daily  folic acid 1 milliGRAM(s) Oral daily  glucagon  Injectable 1 milliGRAM(s) IntraMuscular once  guaiFENesin Oral Liquid (Sugar-Free) 200 milliGRAM(s) Oral every 6 hours PRN  heparin   Injectable 5000 Unit(s) SubCutaneous every 12 hours  insulin glargine Injectable (LANTUS) 6 Unit(s) SubCutaneous at bedtime  insulin lispro (ADMELOG) corrective regimen sliding scale   SubCutaneous every 6 hours  levothyroxine 25 MICROGram(s) Oral daily  memantine 5 milliGRAM(s) Oral at bedtime  metoprolol tartrate 50 milliGRAM(s) Oral two times a day  midodrine 10 milliGRAM(s) Oral <User Schedule>  mirtazapine 7.5 milliGRAM(s) Oral daily  Nephro-celeste 1 Tablet(s) Oral daily  ondansetron Injectable 4 milliGRAM(s) IV Push once PRN  pantoprazole   Suspension 40 milliGRAM(s) Enteral Tube two times a day  QUEtiapine 12.5 milliGRAM(s) Oral every 6 hours PRN  sodium chloride 0.9% lock flush 10 milliLiter(s) IV Push every 1 hour PRN  trihexyphenidyl 2 milliGRAM(s) Oral three times a day    Antimicrobials:

## 2022-01-25 NOTE — PROGRESS NOTE ADULT - ASSESSMENT
72yo M w/ PMHx of CAD (s/p CABG 2019), CKD (unknown stage), DM2, Parkinson's Disease, HTN, depression presents with new onset acute heart failure exacerbation, NSTEMI, started on hep gtt, developed bl RP bleed, acute anemia. sp MICU course for hemorrhagic shock, 10/28 sp IR embolization l4 and l5 lumbar artery. sp permacath and AVF.    # chronic systolic and diastolic heart failure  # ESRD, new HD  # Atrial flutter  # Parkinson's disease   # delirium  # CAD (coronary artery disease)  # HTN  # DM2 (diabetes mellitus, type 2)  # FTT sp PEG  HD via permacath per renal, midodrine during dialysis  sp L AVF using for HD  holding Seroquel for lethargy, now improved, CT head no acute finding, known meningioma  ID following, no indication for abx  namenda, sinemet  cymbalta, remeron  trihexyphenidyl  insulin per endo  lopressor, atorvastatin  tube feeds, abd binder    DVT ppx hsq    DNR/DNI    dc planning    Dr. Pineda will take over tomorrow.  Please contact with any questions or concerns [phone].

## 2022-01-25 NOTE — PROGRESS NOTE ADULT - ASSESSMENT
71M w/ HTN, DM2, CAD-CABG, Parkinson's disease, and advanced CKD, 10/21/21 p/w LE swelling, c/b COVID; now newly ESRD-HD as of this admission    (1)Renal - ESRD-HD TTS   (2)Hyponatremia - higher Na bath with dialysis    (3)Hypercalcemia - lower calcium bath with dialysis   (4)Hypophosphatemia    (5)CV - tenuous hemodyamics - on Midodrine pre-HD      RECOMMEND  (1) Next HD today   SP Nutra phos x 3 today   (2) Will keep permcath for now but using the  AVF(and may be able to removal this week)    (3)Nutritional support      Retacrit at HD     Sayed [facility]   [phone]

## 2022-01-25 NOTE — PROGRESS NOTE ADULT - SUBJECTIVE AND OBJECTIVE BOX
[facility] [phone]        Patient seen and examined in bed.  He has rapid response earlier today         MEDICATIONS  (STANDING):  atorvastatin 80 milliGRAM(s) Oral at bedtime  carbidopa/levodopa  25/100 2.5 Tablet(s) Oral <User Schedule>  carbidopa/levodopa  25/100 2 Tablet(s) Oral <User Schedule>  chlorhexidine 4% Liquid 1 Application(s) Topical <User Schedule>  cinacalcet 60 milliGRAM(s) Oral daily  dextrose 40% Gel 15 Gram(s) Oral once  dextrose 5%. 1000 milliLiter(s) (50 mL/Hr) IV Continuous <Continuous>  dextrose 5%. 1000 milliLiter(s) (100 mL/Hr) IV Continuous <Continuous>  dextrose 50% Injectable 25 Gram(s) IV Push once  dextrose 50% Injectable 12.5 Gram(s) IV Push once  dextrose 50% Injectable 25 Gram(s) IV Push once  diVALproex Sprinkle 250 milliGRAM(s) Oral three times a day  DULoxetine 20 milliGRAM(s) Oral daily  epoetin mari-epbx (RETACRIT) Injectable 05121 Unit(s) IV Push once  folic acid 1 milliGRAM(s) Oral daily  glucagon  Injectable 1 milliGRAM(s) IntraMuscular once  heparin   Injectable 5000 Unit(s) SubCutaneous every 12 hours  insulin glargine Injectable (LANTUS) 6 Unit(s) SubCutaneous at bedtime  insulin lispro (ADMELOG) corrective regimen sliding scale   SubCutaneous every 6 hours  levothyroxine 25 MICROGram(s) Oral daily  memantine 5 milliGRAM(s) Oral at bedtime  metoprolol tartrate 50 milliGRAM(s) Oral two times a day  midodrine 10 milliGRAM(s) Oral <User Schedule>  mirtazapine 7.5 milliGRAM(s) Oral daily  Nephro-celeste 1 Tablet(s) Oral daily  pantoprazole   Suspension 40 milliGRAM(s) Enteral Tube two times a day  potassium phosphate / sodium phosphate Powder (PHOS-NaK) 1 Packet(s) Oral three times a day  trihexyphenidyl 2 milliGRAM(s) Oral three times a day      VITAL:  T(C): , Max: 36.8 (22 @ 04:31)  T(F): , Max: 98.3 (22 @ 04:31)  HR: 85 (22 @ 08:50)  BP: 144/89 (22 @ 08:50)  BP(mean): --  RR: 18 (22 @ 08:50)  SpO2: 93% (22 @ 08:50)  Wt(kg): --    I and O's:     @ 07:01  -   @ 07:00  --------------------------------------------------------  IN: 0 mL / OUT: 1100 mL / NET: -1100 mL          PHYSICAL EXAM:    Constitutional: NAD  Neck:  No JVD  Respiratory: CTAB/L  Cardiovascular: S1 and S2  Gastrointestinal: BS+, soft, NT/ND  Extremities: No peripheral edema  Neurological: A/O x 3, no focal deficits  Psychiatric: Normal mood, normal affect  : No Javier  Skin: No rashes  Access: perm cath and avf     LABS:                        8.7    6.93  )-----------( 264      ( 2022 04:29 )             27.8         128<L>  |  89<L>  |  86<H>  ----------------------------<  116<H>  3.9   |  28  |  4.04<H>    Ca    10.0      2022 04:29  Phos  2.8       Mg     2.1         TPro  7.6  /  Alb  2.3<L>  /  TBili  0.3  /  DBili  x   /  AST  17  /  ALT  <5<L>  /  AlkPhos  88            Urine Studies:  Urinalysis Basic - ( 2022 18:49 )    Color: Yellow / Appearance: Slightly Turbid / S.008 / pH: x  Gluc: x / Ketone: Negative  / Bili: Negative / Urobili: Negative   Blood: x / Protein: 100 / Nitrite: Negative   Leuk Esterase: Large / RBC: 21 /hpf / WBC >50 /HPF   Sq Epi: x / Non Sq Epi: 1 / Bacteria: Many            RADIOLOGY & ADDITIONAL STUDIES:

## 2022-01-25 NOTE — PROGRESS NOTE ADULT - ASSESSMENT
Assessment  DMT2: 71y Male with DM T2 with hyperglycemia, A1C 6.4%, was on insulin at home, started on basal insulin and coverage, blood sugars are trending within overall acceptable range, no hypoglycemias, NAD, on tube feeds.  Hypothyroidism: Patient has no history thyroid disease, was not on any thyroid supplements, subclinical with low-normal FT4, lethargic, on synthroid 25 mcg po daily.  Hypercalcemia: Started on Sensipar 60mg daily, Ca improving.  CHF: on medications, stable, monitored.  HTN: Controlled,  on antihypertensive medications.  Parkinsons: on meds, monitored.  CKD: Monitor labs/BMP      Dr Ayers  Cell : [phone]   Assessment  DMT2: 71y Male with DM T2 with hyperglycemia, A1C 6.4%, was on insulin at home, started on basal insulin and coverage, blood sugars are trending within overall acceptable range, no hypoglycemias,  NAD, on tube feeds.  Hypothyroidism: Patient has no history thyroid disease, was not on any thyroid supplements, subclinical with low-normal FT4, lethargic, on synthroid 25 mcg po daily.  Hypercalcemia: Started on Sensipar 60mg daily, Ca improving.  CHF: on medications, stable, monitored.  HTN: Controlled,  on antihypertensive medications.  Parkinsons: on meds, monitored.  CKD: Monitor labs/BMP      Dr Ayers  Cell : [phone]

## 2022-01-25 NOTE — PROGRESS NOTE ADULT - ASSESSMENT
70 y/o M w/ PMHx of CAD (s/p CABG 2019), CKD (unknown stage), DM2, Parkinson's Disease, HTN, depression who presented with ADHF, NSTEMI s/p hep gtt c/b RP bleed, MICU course for hemorrhagic shock s/p IR embolization. Rapid response overnight. COVID PCR positive 12/28    asymptomatic bacteriuria  alert and able to answer most questions appropriately though confused  pt continues to deny urinary symptoms  has remained afebrile and without leukocytosis  hold off antibiotics at this time  continue to monitor symptoms    We will sign off. Thank you for allowing us to participate in the care of Mr. Quintero. Please feel free to call with any questions or concerns.     Alberto Rojo M.D.  [facility], Division of Infectious Diseases  [phone]  After 5pm on weekdays and all day on weekends - please call [phone]

## 2022-01-25 NOTE — PROGRESS NOTE ADULT - SUBJECTIVE AND OBJECTIVE BOX
Chief complaint  Patient is a 71y old  Male who presents with a chief complaint of leg swelling (25 Jan 2022 10:19)   Review of systems  Patient in bed, looks comfortable, no hypoglycemic episodes.    Labs and Fingersticks  CAPILLARY BLOOD GLUCOSE      POCT Blood Glucose.: 80 mg/dL (25 Jan 2022 13:00)  POCT Blood Glucose.: 108 mg/dL (25 Jan 2022 06:12)  POCT Blood Glucose.: 204 mg/dL (24 Jan 2022 23:58)  POCT Blood Glucose.: 88 mg/dL (24 Jan 2022 17:52)      Anion Gap, Serum: 11 (01-25 @ 04:29)  Anion Gap, Serum: 10 (01-24 @ 01:17)      Calcium, Total Serum: 10.0 (01-25 @ 04:29)  Calcium, Total Serum: 9.6 (01-24 @ 01:17)  Albumin, Serum: 2.3 *L* (01-25 @ 04:29)  Albumin, Serum: 1.7 *L* (01-24 @ 01:17)    Alanine Aminotransferase (ALT/SGPT): <5 *L* (01-25 @ 04:29)  Alanine Aminotransferase (ALT/SGPT): <5 *L* (01-24 @ 01:17)  Alkaline Phosphatase, Serum: 88 (01-25 @ 04:29)  Alkaline Phosphatase, Serum: 81 (01-24 @ 01:17)  Aspartate Aminotransferase (AST/SGOT): 17 (01-25 @ 04:29)  Aspartate Aminotransferase (AST/SGOT): 18 (01-24 @ 01:17)        01-25    128<L>  |  89<L>  |  86<H>  ----------------------------<  116<H>  3.9   |  28  |  4.04<H>    Ca    10.0      25 Jan 2022 04:29  Phos  2.8     01-25  Mg     2.1     01-24    TPro  7.6  /  Alb  2.3<L>  /  TBili  0.3  /  DBili  x   /  AST  17  /  ALT  <5<L>  /  AlkPhos  88  01-25                        8.7    6.93  )-----------( 264      ( 25 Jan 2022 04:29 )             27.8     Medications  MEDICATIONS  (STANDING):  atorvastatin 80 milliGRAM(s) Oral at bedtime  carbidopa/levodopa  25/100 2.5 Tablet(s) Oral <User Schedule>  carbidopa/levodopa  25/100 2 Tablet(s) Oral <User Schedule>  chlorhexidine 4% Liquid 1 Application(s) Topical <User Schedule>  cinacalcet 60 milliGRAM(s) Oral daily  dextrose 40% Gel 15 Gram(s) Oral once  dextrose 5%. 1000 milliLiter(s) (50 mL/Hr) IV Continuous <Continuous>  dextrose 5%. 1000 milliLiter(s) (100 mL/Hr) IV Continuous <Continuous>  dextrose 50% Injectable 25 Gram(s) IV Push once  dextrose 50% Injectable 12.5 Gram(s) IV Push once  dextrose 50% Injectable 25 Gram(s) IV Push once  diVALproex Sprinkle 250 milliGRAM(s) Oral three times a day  DULoxetine 20 milliGRAM(s) Oral daily  folic acid 1 milliGRAM(s) Oral daily  glucagon  Injectable 1 milliGRAM(s) IntraMuscular once  heparin   Injectable 5000 Unit(s) SubCutaneous every 12 hours  insulin glargine Injectable (LANTUS) 6 Unit(s) SubCutaneous at bedtime  insulin lispro (ADMELOG) corrective regimen sliding scale   SubCutaneous every 6 hours  levothyroxine 25 MICROGram(s) Oral daily  memantine 5 milliGRAM(s) Oral at bedtime  metoprolol tartrate 50 milliGRAM(s) Oral two times a day  midodrine 10 milliGRAM(s) Oral <User Schedule>  mirtazapine 7.5 milliGRAM(s) Oral daily  Nephro-celeste 1 Tablet(s) Oral daily  pantoprazole   Suspension 40 milliGRAM(s) Enteral Tube two times a day  trihexyphenidyl 2 milliGRAM(s) Oral three times a day      Physical Exam  General: Patient comfortable in bed  Vital Signs Last 12 Hrs  T(F): 97.9 (01-25-22 @ 12:18), Max: 98.3 (01-25-22 @ 04:31)  HR: 101 (01-25-22 @ 12:18) (85 - 101)  BP: 137/84 (01-25-22 @ 12:18) (132/77 - 144/89)  BP(mean): --  RR: 18 (01-25-22 @ 12:18) (18 - 18)  SpO2: 97% (01-25-22 @ 12:18) (93% - 97%)       Chief complaint  Patient is a 71y old  Male who presents with a chief complaint of leg swelling (25 Jan 2022 10:19)   Review of systems  Patient in bed, looks comfortable, no hypoglycemic episodes.    Labs and Fingersticks  CAPILLARY BLOOD GLUCOSE      POCT Blood Glucose.: 80 mg/dL (25 Jan 2022 13:00)  POCT Blood Glucose.: 108 mg/dL (25 Jan 2022 06:12)  POCT Blood Glucose.: 204 mg/dL (24 Jan 2022 23:58)  POCT Blood Glucose.: 88 mg/dL (24 Jan 2022 17:52)      Anion Gap, Serum: 11 (01-25 @ 04:29)  Anion Gap, Serum: 10 (01-24 @ 01:17)      Calcium, Total Serum: 10.0 (01-25 @ 04:29)  Calcium, Total Serum: 9.6 (01-24 @ 01:17)  Albumin, Serum: 2.3 *L* (01-25 @ 04:29)  Albumin, Serum: 1.7 *L* (01-24 @ 01:17)    Alanine Aminotransferase (ALT/SGPT): <5 *L* (01-25 @ 04:29)  Alanine Aminotransferase (ALT/SGPT): <5 *L* (01-24 @ 01:17)  Alkaline Phosphatase, Serum: 88 (01-25 @ 04:29)  Alkaline Phosphatase, Serum: 81 (01-24 @ 01:17)  Aspartate Aminotransferase (AST/SGOT): 17 (01-25 @ 04:29)  Aspartate Aminotransferase (AST/SGOT): 18 (01-24 @ 01:17)        01-25    128<L>  |  89<L>  |  86<H>  ----------------------------<  116<H>  3.9   |  28  |  4.04<H>    Ca    10.0      25 Jan 2022 04:29  Phos  2.8     01-25  Mg     2.1     01-24    TPro  7.6  /  Alb  2.3<L>  /  TBili  0.3  /  DBili  x   /  AST  17  /  ALT  <5<L>  /  AlkPhos  88  01-25                        8.7    6.93  )-----------( 264      ( 25 Jan 2022 04:29 )             27.8     Medications  MEDICATIONS  (STANDING):  atorvastatin 80 milliGRAM(s) Oral at bedtime  carbidopa/levodopa  25/100 2.5 Tablet(s) Oral <User Schedule>  carbidopa/levodopa  25/100 2 Tablet(s) Oral <User Schedule>  chlorhexidine 4% Liquid 1 Application(s) Topical <User Schedule>  cinacalcet 60 milliGRAM(s) Oral daily  dextrose 40% Gel 15 Gram(s) Oral once  dextrose 5%. 1000 milliLiter(s) (50 mL/Hr) IV Continuous <Continuous>  dextrose 5%. 1000 milliLiter(s) (100 mL/Hr) IV Continuous <Continuous>  dextrose 50% Injectable 25 Gram(s) IV Push once  dextrose 50% Injectable 12.5 Gram(s) IV Push once  dextrose 50% Injectable 25 Gram(s) IV Push once  diVALproex Sprinkle 250 milliGRAM(s) Oral three times a day  DULoxetine 20 milliGRAM(s) Oral daily  folic acid 1 milliGRAM(s) Oral daily  glucagon  Injectable 1 milliGRAM(s) IntraMuscular once  heparin   Injectable 5000 Unit(s) SubCutaneous every 12 hours  insulin glargine Injectable (LANTUS) 6 Unit(s) SubCutaneous at bedtime  insulin lispro (ADMELOG) corrective regimen sliding scale   SubCutaneous every 6 hours  levothyroxine 25 MICROGram(s) Oral daily  memantine 5 milliGRAM(s) Oral at bedtime  metoprolol tartrate 50 milliGRAM(s) Oral two times a day  midodrine 10 milliGRAM(s) Oral <User Schedule>  mirtazapine 7.5 milliGRAM(s) Oral daily  Nephro-celeste 1 Tablet(s) Oral daily  pantoprazole   Suspension 40 milliGRAM(s) Enteral Tube two times a day  trihexyphenidyl 2 milliGRAM(s) Oral three times a day      Physical Exam  General: Patient comfortable in bed  Vital Signs Last 12 Hrs  T(F): 97.9 (01-25-22 @ 12:18), Max: 98.3 (01-25-22 @ 04:31)  HR: 101 (01-25-22 @ 12:18) (85 - 101)  BP: 137/84 (01-25-22 @ 12:18) (132/77 - 144/89)  BP(mean): --  RR: 18 (01-25-22 @ 12:18) (18 - 18)  SpO2: 97% (01-25-22 @ 12:18) (93% - 97%)

## 2022-01-25 NOTE — PROGRESS NOTE ADULT - SUBJECTIVE AND OBJECTIVE BOX
Patient is a 71y Male     Patient is a 71y old  Male who presents with a chief complaint of leg swelling (24 Jan 2022 15:59)      HPI:  72yo M w/ PMHx of CAD (s/p CABG 2019), CKD (unknown stage), DM2, Parkinson's Disease, HTN, depression presents with bilateral leg swelling, patient's daughter in-law at bedside Yoly Quintero (4 [facility] Nurse) provides the history, the daughter reports the patient is a poor historian, unable to remember having conversations with others and likely cannot weigh risk/benefits of medical conditions, she reports that he has had bilateral lower extremity swelling for the past few months, but over the past 2 weeks it has significantly worsened, he has further difficulty ambulating due to swelling, his outpatient provider attempted to manage with 20mg lasix and then increased to 40mg lasix but the patient never took the increased dose, his symptoms ar persistent throughout the day and are extremely severe, he has developed wounds of the legs with weeping of fluid due to the swelling, she reports that the patient is frequently non-compliant with medications and medical management, he lives at home with his son and daughter in law, he denies chest pain, shortness of breath, fever/chills, cough, sputum, in the ED, he was tachycardic but hemodynamically stable, afebrile, saturating well on room air, labs were significant for elevated BNP, elevated creatinine, imaging showed moderate left pleural effusion, patient was admitted to general medicine for further management  (21 Oct 2021 07:06)      PAST MEDICAL & SURGICAL HISTORY:  Hypertension    Diabetes Mellitus, Type 2    Hypercholesterolemia    Parkinson disease    CAD (coronary artery disease)  s/p 1 stent in 2010 and CABG 2019    S/P CABG (coronary artery bypass graft)  2019    S/P hip replacement, right  2016    Anxiety    Depression, major        MEDICATIONS  (STANDING):  atorvastatin 80 milliGRAM(s) Oral at bedtime  carbidopa/levodopa  25/100 2.5 Tablet(s) Oral <User Schedule>  carbidopa/levodopa  25/100 2 Tablet(s) Oral <User Schedule>  chlorhexidine 4% Liquid 1 Application(s) Topical <User Schedule>  cinacalcet 60 milliGRAM(s) Oral daily  dextrose 40% Gel 15 Gram(s) Oral once  dextrose 5%. 1000 milliLiter(s) (100 mL/Hr) IV Continuous <Continuous>  dextrose 5%. 1000 milliLiter(s) (50 mL/Hr) IV Continuous <Continuous>  dextrose 50% Injectable 25 Gram(s) IV Push once  dextrose 50% Injectable 12.5 Gram(s) IV Push once  dextrose 50% Injectable 25 Gram(s) IV Push once  diVALproex Sprinkle 250 milliGRAM(s) Oral three times a day  DULoxetine 20 milliGRAM(s) Oral daily  epoetin mari-epbx (RETACRIT) Injectable 12375 Unit(s) IV Push once  folic acid 1 milliGRAM(s) Oral daily  glucagon  Injectable 1 milliGRAM(s) IntraMuscular once  heparin   Injectable 5000 Unit(s) SubCutaneous every 12 hours  insulin glargine Injectable (LANTUS) 6 Unit(s) SubCutaneous at bedtime  insulin lispro (ADMELOG) corrective regimen sliding scale   SubCutaneous every 6 hours  levothyroxine 25 MICROGram(s) Oral daily  memantine 5 milliGRAM(s) Oral at bedtime  metoprolol tartrate 50 milliGRAM(s) Oral two times a day  midodrine 10 milliGRAM(s) Oral <User Schedule>  mirtazapine 7.5 milliGRAM(s) Oral daily  Nephro-celeste 1 Tablet(s) Oral daily  pantoprazole   Suspension 40 milliGRAM(s) Enteral Tube two times a day  potassium phosphate / sodium phosphate Powder (PHOS-NaK) 1 Packet(s) Oral three times a day  trihexyphenidyl 2 milliGRAM(s) Oral three times a day      Allergies    adhesives (Rash)  latex (Urticaria)  No Known Drug Allergies    Intolerances        SOCIAL HISTORY:  Denies ETOh,Smoking,     FAMILY HISTORY:  Family history of hypertension    Family history of malaria    Family history of pulmonary fibrosis (Sibling)        REVIEW OF SYSTEMS:    CONSTITUTIONAL: No weakness, fevers or chills  EYES/ENT: No visual changes;  No vertigo or throat pain   NECK: No pain or stiffness  RESPIRATORY: No cough, wheezing, hemoptysis; No shortness of breath  CARDIOVASCULAR: No chest pain or palpitations  GASTROINTESTINAL: No abdominal or epigastric pain. No nausea, vomiting, or hematemesis; No diarrhea or constipation. No melena or hematochezia.  GENITOURINARY: No dysuria, frequency or hematuria  NEUROLOGICAL: No numbness or weakness  SKIN: No itching, burning, rashes, or lesions   All other review of systems is negative unless indicated above.    VITAL:  T(C): , Max: 36.8 (01-25-22 @ 04:31)  T(F): , Max: 98.3 (01-25-22 @ 04:31)  HR: 85 (01-25-22 @ 04:31)  BP: 132/77 (01-25-22 @ 04:31)  BP(mean): --  RR: 18 (01-25-22 @ 04:31)  SpO2: 97% (01-25-22 @ 04:31)  Wt(kg): --    I and O's:    01-23 @ 07:01  -  01-24 @ 07:00  --------------------------------------------------------  IN: 0 mL / OUT: 1050 mL / NET: -1050 mL    01-24 @ 07:01  -  01-25 @ 07:00  --------------------------------------------------------  IN: 0 mL / OUT: 1100 mL / NET: -1100 mL          PHYSICAL EXAM:    Constitutional: NAD  HEENT: PERRLA,   Neck: No JVD  Respiratory: CTA B/L  Cardiovascular: S1 and S2  Gastrointestinal: BS+, soft, NT/ND  Extremities: No peripheral edema  Neurological: A/O x 3, no focal deficits  Psychiatric: Normal mood, normal affect  : No Javier  Skin: No rashes  Access: Not applicable  Back: No CVA tenderness    LABS:                        8.7    6.93  )-----------( 264      ( 25 Jan 2022 04:29 )             27.8     01-25    128<L>  |  89<L>  |  86<H>  ----------------------------<  116<H>  3.9   |  28  |  4.04<H>    Ca    10.0      25 Jan 2022 04:29  Phos  2.8     01-25  Mg     2.1     01-24    TPro  7.6  /  Alb  2.3<L>  /  TBili  0.3  /  DBili  x   /  AST  17  /  ALT  <5<L>  /  AlkPhos  88  01-25          RADIOLOGY & ADDITIONAL STUDIES:

## 2022-01-26 NOTE — CONSULT NOTE ADULT - ASSESSMENT
A/P: 70yo M w/ PMHx of CAD (s/p CABG 2019), CKD (unknown stage), DM2, Parkinson's Disease, HTN, depression presents with new onset acute heart failure exacerbation, NSTEMI, started on hep gtt, developed bl RP bleed, acute anemia. sp MICU course for hemorrhagic shock, 10/28 sp IR embolization l4 and l5 lumbar artery. sp permacath and AVF.        -1/26 s/p R IJ PERMCATH REMOVED AT BEDSIDE By IR PA, Jalili   -Patient was lying flat and R permcath was removed under standard fashion.   -Direct pressure was applied to stop bleeding and tegroderm dressing was applied after hemostasis achieved. Patient tolerated procedure well. Vitals stable during the procedure.   -Patient's RN was instructed to check dressing frequently. No complications.  -Please call extension [phone] with any questions, concerns or issues regarding above.   - IR will sign off.  reconsult prn           A/P: 72yo M w/ PMHx of CAD (s/p CABG 2019), CKD (unknown stage), DM2, Parkinson's Disease, HTN, depression presents with new onset acute heart failure exacerbation, NSTEMI, started on hep gtt, developed bl RP bleed, acute anemia. sp MICU course for hemorrhagic shock, 10/28 sp IR embolization l4 and l5 lumbar artery. sp permacath and AVF.   IR consulted 1/26 for permcath removal        -1/26 s/p R IJ PERMCATH REMOVED AT BEDSIDE By IR PA, Jalili   -Patient was lying flat and R permcath was removed under standard fashion.   -Direct pressure was applied to stop bleeding and tegroderm dressing was applied after hemostasis achieved. Patient tolerated procedure well. Vitals stable during the procedure.   -Patient's RN was instructed to check dressing frequently. No complications.  -Please call extension [phone] with any questions, concerns or issues regarding above.   - IR will sign off.  reconsult prn

## 2022-01-26 NOTE — CONSULT NOTE ADULT - SUBJECTIVE AND OBJECTIVE BOX
Interventional Radiology    Evaluate for Procedure:  R permcath removal     HPI:72yo M w/ PMHx of CAD (s/p CABG 2019), CKD (unknown stage), DM2, Parkinson's Disease, HTN, depression presents with new onset acute heart failure exacerbation, NSTEMI, started on hep gtt, developed bl RP bleed, acute anemia. sp MICU course for hemorrhagic shock, 10/28 sp IR embolization l4 and l5 lumbar artery. sp permacath and AVF. AVF now matured. IR consulted 1/26 for permcath removal      ROS  General:  sitting upright in bed  Resp:  No dyspnea, cough,  wheezing, non labored   neck:  R tunneled hd cath, neck veins non distended.       PMHx  Hypertension  Diabetes Mellitus, Type 2  Hypercholesterolemia  Parkinson disease  CAD (coronary artery disease)      Allergies  adhesives (Rash)  latex (Urticaria)  No Known Drug Allergies    Medications  MEDICATIONS  (STANDING):  atorvastatin 80 milliGRAM(s) Oral at bedtime  carbidopa/levodopa  25/100 2.5 Tablet(s) Oral <User Schedule>  carbidopa/levodopa  25/100 2 Tablet(s) Oral <User Schedule>  chlorhexidine 4% Liquid 1 Application(s) Topical <User Schedule>  cinacalcet 60 milliGRAM(s) Oral daily  dextrose 40% Gel 15 Gram(s) Oral once  dextrose 5%. 1000 milliLiter(s) (50 mL/Hr) IV Continuous <Continuous>  dextrose 5%. 1000 milliLiter(s) (100 mL/Hr) IV Continuous <Continuous>  dextrose 50% Injectable 25 Gram(s) IV Push once  dextrose 50% Injectable 12.5 Gram(s) IV Push once  dextrose 50% Injectable 25 Gram(s) IV Push once  diVALproex Sprinkle 250 milliGRAM(s) Oral three times a day  DULoxetine 20 milliGRAM(s) Oral daily  folic acid 1 milliGRAM(s) Oral daily  glucagon  Injectable 1 milliGRAM(s) IntraMuscular once  heparin   Injectable 5000 Unit(s) SubCutaneous every 12 hours  insulin glargine Injectable (LANTUS) 6 Unit(s) SubCutaneous at bedtime  insulin lispro (ADMELOG) corrective regimen sliding scale   SubCutaneous every 6 hours  levothyroxine 25 MICROGram(s) Oral daily  memantine 5 milliGRAM(s) Oral at bedtime  metoprolol tartrate 50 milliGRAM(s) Oral two times a day  midodrine 10 milliGRAM(s) Oral <User Schedule>  mirtazapine 7.5 milliGRAM(s) Oral daily  Nephro-celeste 1 Tablet(s) Oral daily  pantoprazole   Suspension 40 milliGRAM(s) Enteral Tube two times a day  QUEtiapine 25 milliGRAM(s) Oral at bedtime  trihexyphenidyl 2 milliGRAM(s) Oral three times a day    MEDICATIONS  (PRN):  acetaminophen     Tablet .. 650 milliGRAM(s) Oral every 6 hours PRN Mild Pain (1 - 3)  albuterol/ipratropium for Nebulization 3 milliLiter(s) Nebulizer every 6 hours PRN Shortness of Breath and/or Wheezing  diVALproex Sprinkle 125 milliGRAM(s) Oral every 8 hours PRN Agitation  guaiFENesin Oral Liquid (Sugar-Free) 200 milliGRAM(s) Oral every 6 hours PRN Cough  ondansetron Injectable 4 milliGRAM(s) IV Push once PRN Nausea and/or Vomiting  QUEtiapine 12.5 milliGRAM(s) Oral every 6 hours PRN agitation  sodium chloride 0.9% lock flush 10 milliLiter(s) IV Push every 1 hour PRN Pre/post blood products, medications, blood draw, and to maintain line patency        PHYSICAL EXAM:  T(C): 36.7, Max: 37 (01-26-22 @ 04:55)  HR: 106  BP: 146/93  RR: 18  SpO2: 94%    General:  No acute distress, well-appearing  Neuro:  A &O x 3  Respiratory: Non-labored breathing  Abdomen:  Soft, non-tender, non-distended, no peritoneal signs  Extremities:  no swelling, warm, normal color                          8.6    7.46  )-----------( 281      ( 26 Jan 2022 05:31 )             27.0       01-26    136  |  99  |  51<H>  ----------------------------<  116<H>  4.1   |  24  |  2.86<H>    Ca    9.2      26 Jan 2022 05:31  Phos  2.7     01-26    TPro  7.6  /  Alb  2.3<L>  /  TBili  0.3  /  DBili  x   /  AST  17  /  ALT  <5<L>  /  AlkPhos  88  01-25

## 2022-01-26 NOTE — PROGRESS NOTE ADULT - ASSESSMENT
71M w/ HTN, DM2, CAD-CABG, Parkinson's disease, and advanced CKD, 10/21/21 p/w LE swelling, c/b COVID; now newly ESRD-HD as of this admission    (1)Renal - ESRD-HD TTS   (2)Hyponatremia - higher Na bath with dialysis    (3)Hypercalcemia - lower calcium bath with dialysis   (4)Hypophosphatemia    (5)CV - tenuous hemodyamics - on Midodrine pre-HD      RECOMMEND  (1) Next HD in am   (2) Will make arrangements to pull perm cath     (3)Nutritional support   (4)Monitor urine output      Retacrit at HD     [facility]   [phone]

## 2022-01-26 NOTE — PROGRESS NOTE ADULT - SUBJECTIVE AND OBJECTIVE BOX
Patient is a 71y old  Male who presents with a chief complaint of leg swelling (2022 14:58)      INTERVAL HPI/OVERNIGHT EVENTS: noted  pt seen and examined this am   events noted      Vital Signs Last 24 Hrs  T(C): 36.7 (2022 10:58), Max: 37 (2022 04:55)  T(F): 98.1 (2022 10:58), Max: 98.6 (2022 04:55)  HR: 106 (2022 10:58) (102 - 106)  BP: 146/93 (2022 10:58) (146/93 - 160/89)  BP(mean): --  RR: 18 (2022 10:58) (18 - 20)  SpO2: 94% (2022 10:58) (93% - 94%)    acetaminophen     Tablet .. 650 milliGRAM(s) Oral every 6 hours PRN  albuterol/ipratropium for Nebulization 3 milliLiter(s) Nebulizer every 6 hours PRN  atorvastatin 80 milliGRAM(s) Oral at bedtime  carbidopa/levodopa  25/100 2.5 Tablet(s) Oral <User Schedule>  carbidopa/levodopa  25/100 2 Tablet(s) Oral <User Schedule>  chlorhexidine 4% Liquid 1 Application(s) Topical <User Schedule>  cinacalcet 60 milliGRAM(s) Oral daily  dextrose 40% Gel 15 Gram(s) Oral once  dextrose 5%. 1000 milliLiter(s) IV Continuous <Continuous>  dextrose 5%. 1000 milliLiter(s) IV Continuous <Continuous>  dextrose 50% Injectable 25 Gram(s) IV Push once  dextrose 50% Injectable 12.5 Gram(s) IV Push once  dextrose 50% Injectable 25 Gram(s) IV Push once  diVALproex Sprinkle 125 milliGRAM(s) Oral every 8 hours PRN  diVALproex Sprinkle 250 milliGRAM(s) Oral three times a day  DULoxetine 20 milliGRAM(s) Oral daily  folic acid 1 milliGRAM(s) Oral daily  glucagon  Injectable 1 milliGRAM(s) IntraMuscular once  guaiFENesin Oral Liquid (Sugar-Free) 200 milliGRAM(s) Oral every 6 hours PRN  heparin   Injectable 5000 Unit(s) SubCutaneous every 12 hours  insulin glargine Injectable (LANTUS) 6 Unit(s) SubCutaneous at bedtime  insulin lispro (ADMELOG) corrective regimen sliding scale   SubCutaneous every 6 hours  levothyroxine 25 MICROGram(s) Oral daily  memantine 5 milliGRAM(s) Oral at bedtime  metoprolol tartrate 50 milliGRAM(s) Oral two times a day  midodrine 10 milliGRAM(s) Oral <User Schedule>  mirtazapine 7.5 milliGRAM(s) Oral daily  Nephro-celeste 1 Tablet(s) Oral daily  ondansetron Injectable 4 milliGRAM(s) IV Push once PRN  pantoprazole   Suspension 40 milliGRAM(s) Enteral Tube two times a day  QUEtiapine 12.5 milliGRAM(s) Oral every 6 hours PRN  QUEtiapine 25 milliGRAM(s) Oral at bedtime  sodium chloride 0.9% lock flush 10 milliLiter(s) IV Push every 1 hour PRN  trihexyphenidyl 2 milliGRAM(s) Oral three times a day      PHYSICAL EXAM:  GENERAL: NAD,   EYES: conjunctiva and sclera clear  ENMT: Moist mucous membranes  NECK: Supple, No JVD, Normal thyroid  CHEST/LUNG: non labored, cta b/l  HEART: Regular rate and rhythm; No murmurs, rubs, or gallops  ABDOMEN: Soft, Nontender, Nondistended; Bowel sounds present  EXTREMITIES:  2+ Peripheral Pulses, No clubbing, cyanosis, or edema  LYMPH: No lymphadenopathy noted  SKIN: No rashes or lesions    Consultant(s) Notes Reviewed:  [x ] YES  [ ] NO  Care Discussed with Consultants/Other Providers [ x] YES  [ ] NO    LABS:                        8.6    7.46  )-----------( 281      ( 2022 05:31 )             27.0         136  |  99  |  51<H>  ----------------------------<  116<H>  4.1   |  24  |  2.86<H>    Ca    9.2      2022 05:31  Phos  2.7         TPro  7.6  /  Alb  2.3<L>  /  TBili  0.3  /  DBili  x   /  AST  17  /  ALT  <5<L>  /  AlkPhos  88        Urinalysis Basic - ( 2022 18:49 )    Color: Yellow / Appearance: Slightly Turbid / S.008 / pH: x  Gluc: x / Ketone: Negative  / Bili: Negative / Urobili: Negative   Blood: x / Protein: 100 / Nitrite: Negative   Leuk Esterase: Large / RBC: 21 /hpf / WBC >50 /HPF   Sq Epi: x / Non Sq Epi: 1 / Bacteria: Many      CAPILLARY BLOOD GLUCOSE      POCT Blood Glucose.: 199 mg/dL (2022 11:37)  POCT Blood Glucose.: 129 mg/dL (2022 05:24)  POCT Blood Glucose.: 144 mg/dL (2022 23:41)  POCT Blood Glucose.: 193 mg/dL (2022 21:03)  POCT Blood Glucose.: 127 mg/dL (2022 17:30)        Urinalysis Basic - ( 2022 18:49 )    Color: Yellow / Appearance: Slightly Turbid / S.008 / pH: x  Gluc: x / Ketone: Negative  / Bili: Negative / Urobili: Negative   Blood: x / Protein: 100 / Nitrite: Negative   Leuk Esterase: Large / RBC: 21 /hpf / WBC >50 /HPF   Sq Epi: x / Non Sq Epi: 1 / Bacteria: Many        Culture - Urine (collected 2022 00:53)  Source: Clean Catch Clean Catch (Midstream)  Preliminary Report (2022 06:41):    >100,000 CFU/ml Gram Negative Rods        RADIOLOGY & ADDITIONAL TESTS:    Imaging Personally Reviewed:  [x ] YES  [ ] NO Patient is a 71y old  Male who presents with a chief complaint of leg swelling (2022 14:58)      INTERVAL HPI/OVERNIGHT EVENTS: noted  pt seen and examined this am   events noted  sleepy  calm and cooperative per staff      Vital Signs Last 24 Hrs  T(C): 36.7 (2022 10:58), Max: 37 (2022 04:55)  T(F): 98.1 (2022 10:58), Max: 98.6 (2022 04:55)  HR: 106 (2022 10:58) (102 - 106)  BP: 146/93 (2022 10:58) (146/93 - 160/89)  BP(mean): --  RR: 18 (2022 10:58) (18 - 20)  SpO2: 94% (2022 10:58) (93% - 94%)    acetaminophen     Tablet .. 650 milliGRAM(s) Oral every 6 hours PRN  albuterol/ipratropium for Nebulization 3 milliLiter(s) Nebulizer every 6 hours PRN  atorvastatin 80 milliGRAM(s) Oral at bedtime  carbidopa/levodopa  25/100 2.5 Tablet(s) Oral <User Schedule>  carbidopa/levodopa  25/100 2 Tablet(s) Oral <User Schedule>  chlorhexidine 4% Liquid 1 Application(s) Topical <User Schedule>  cinacalcet 60 milliGRAM(s) Oral daily  dextrose 40% Gel 15 Gram(s) Oral once  dextrose 5%. 1000 milliLiter(s) IV Continuous <Continuous>  dextrose 5%. 1000 milliLiter(s) IV Continuous <Continuous>  dextrose 50% Injectable 25 Gram(s) IV Push once  dextrose 50% Injectable 12.5 Gram(s) IV Push once  dextrose 50% Injectable 25 Gram(s) IV Push once  diVALproex Sprinkle 125 milliGRAM(s) Oral every 8 hours PRN  diVALproex Sprinkle 250 milliGRAM(s) Oral three times a day  DULoxetine 20 milliGRAM(s) Oral daily  folic acid 1 milliGRAM(s) Oral daily  glucagon  Injectable 1 milliGRAM(s) IntraMuscular once  guaiFENesin Oral Liquid (Sugar-Free) 200 milliGRAM(s) Oral every 6 hours PRN  heparin   Injectable 5000 Unit(s) SubCutaneous every 12 hours  insulin glargine Injectable (LANTUS) 6 Unit(s) SubCutaneous at bedtime  insulin lispro (ADMELOG) corrective regimen sliding scale   SubCutaneous every 6 hours  levothyroxine 25 MICROGram(s) Oral daily  memantine 5 milliGRAM(s) Oral at bedtime  metoprolol tartrate 50 milliGRAM(s) Oral two times a day  midodrine 10 milliGRAM(s) Oral <User Schedule>  mirtazapine 7.5 milliGRAM(s) Oral daily  Nephro-celeste 1 Tablet(s) Oral daily  ondansetron Injectable 4 milliGRAM(s) IV Push once PRN  pantoprazole   Suspension 40 milliGRAM(s) Enteral Tube two times a day  QUEtiapine 12.5 milliGRAM(s) Oral every 6 hours PRN  QUEtiapine 25 milliGRAM(s) Oral at bedtime  sodium chloride 0.9% lock flush 10 milliLiter(s) IV Push every 1 hour PRN  trihexyphenidyl 2 milliGRAM(s) Oral three times a day      PHYSICAL EXAM:  GENERAL: NAD,   EYES: conjunctiva and sclera clear  ENMT: Moist mucous membranes  NECK: Supple, No JVD, Normal thyroid  CHEST/LUNG: non labored, cta b/l  HEART: Regular rate and rhythm; No murmurs, rubs, or gallops  ABDOMEN: Soft, Nontender, Nondistended; Bowel sounds present  EXTREMITIES:  2+ Peripheral Pulses, No clubbing, cyanosis, or edema  LYMPH: No lymphadenopathy noted  SKIN: No rashes or lesions    Consultant(s) Notes Reviewed:  [x ] YES  [ ] NO  Care Discussed with Consultants/Other Providers [ x] YES  [ ] NO    LABS:                        8.6    7.46  )-----------( 281      ( 2022 05:31 )             27.0     -    136  |  99  |  51<H>  ----------------------------<  116<H>  4.1   |  24  |  2.86<H>    Ca    9.2      2022 05:31  Phos  2.7     -    TPro  7.6  /  Alb  2.3<L>  /  TBili  0.3  /  DBili  x   /  AST  17  /  ALT  <5<L>  /  AlkPhos  88  01-25      Urinalysis Basic - ( 2022 18:49 )    Color: Yellow / Appearance: Slightly Turbid / S.008 / pH: x  Gluc: x / Ketone: Negative  / Bili: Negative / Urobili: Negative   Blood: x / Protein: 100 / Nitrite: Negative   Leuk Esterase: Large / RBC: 21 /hpf / WBC >50 /HPF   Sq Epi: x / Non Sq Epi: 1 / Bacteria: Many      CAPILLARY BLOOD GLUCOSE      POCT Blood Glucose.: 199 mg/dL (2022 11:37)  POCT Blood Glucose.: 129 mg/dL (2022 05:24)  POCT Blood Glucose.: 144 mg/dL (2022 23:41)  POCT Blood Glucose.: 193 mg/dL (2022 21:03)  POCT Blood Glucose.: 127 mg/dL (2022 17:30)        Urinalysis Basic - ( 2022 18:49 )    Color: Yellow / Appearance: Slightly Turbid / S.008 / pH: x  Gluc: x / Ketone: Negative  / Bili: Negative / Urobili: Negative   Blood: x / Protein: 100 / Nitrite: Negative   Leuk Esterase: Large / RBC: 21 /hpf / WBC >50 /HPF   Sq Epi: x / Non Sq Epi: 1 / Bacteria: Many        Culture - Urine (collected 2022 00:53)  Source: Clean Catch Clean Catch (Midstream)  Preliminary Report (2022 06:41):    >100,000 CFU/ml Gram Negative Rods        RADIOLOGY & ADDITIONAL TESTS:    Imaging Personally Reviewed:  [x ] YES  [ ] NO

## 2022-01-26 NOTE — PROGRESS NOTE ADULT - ASSESSMENT
72yo M w/ PMHx of CAD (s/p CABG 2019), CKD (unknown stage), DM2, Parkinson's Disease, HTN, depression presents with new onset acute heart failure exacerbation, NSTEMI, started on hep gtt, developed bl RP bleed, acute anemia. sp MICU course for hemorrhagic shock, 10/28 sp IR embolization l4 and l5 lumbar artery. sp permacath and AVF.    # chronic systolic and diastolic heart failure  # ESRD, new HD  # Atrial flutter  # Parkinson's disease   # delirium  # CAD (coronary artery disease)  # HTN  # DM2 (diabetes mellitus, type 2)  # FTT sp PEG  HD via permacath per renal, midodrine during dialysis  sp L AVF using for HD  holding Seroquel for lethargy, now improved, CT head no acute finding, known meningioma  ID following, no indication for abx  namenda, sinemet  cymbalta, remeron  trihexyphenidyl  insulin per endo  lopressor, atorvastatin  tube feeds, abd binder    DVT ppx hsq    DNR/DNI    dc planning    Dr. Pineda will take over tomorrow.  Please contact with any questions or concerns [phone]. 70yo M w/ PMHx of CAD (s/p CABG 2019), CKD (unknown stage), DM2, Parkinson's Disease, HTN, depression presents with new onset acute heart failure exacerbation, NSTEMI, started on hep gtt, developed bl RP bleed, acute anemia. sp MICU course for hemorrhagic shock, 10/28 sp IR embolization l4 and l5 lumbar artery. sp permacath and AVF.    # chronic systolic and diastolic heart failure  # ESRD, new HD  # Atrial flutter  # Parkinson's disease   # delirium  # CAD (coronary artery disease)  # HTN  # DM2 (diabetes mellitus, type 2)  # FTT sp PEG   midodrine during dialysis  sp L AVF using for HD, per renal permacath can be dced, IR eris called for permacath dc  holding Seroquel for lethargy, now improved, CT head no acute finding, known meningioma  psych reconsult for seroquel adjustment  ID following, no indication for abx  namenda, sinemet  cymbalta, remeron  trihexyphenidyl  insulin per endo  lopressor, atorvastatin  tube feeds, abd binder    DVT ppx hsq    DNR/DNI    dc planning    [facility]  [phone]

## 2022-01-26 NOTE — PROGRESS NOTE ADULT - SUBJECTIVE AND OBJECTIVE BOX
Chief complaint  Patient is a 71y old  Male who presents with a chief complaint of leg swelling (26 Jan 2022 10:27)   Review of systems  Patient in bed, looks comfortable, no hypoglycemic episodes.    Labs and Fingersticks  CAPILLARY BLOOD GLUCOSE      POCT Blood Glucose.: 199 mg/dL (26 Jan 2022 11:37)  POCT Blood Glucose.: 129 mg/dL (26 Jan 2022 05:24)  POCT Blood Glucose.: 144 mg/dL (25 Jan 2022 23:41)  POCT Blood Glucose.: 193 mg/dL (25 Jan 2022 21:03)  POCT Blood Glucose.: 127 mg/dL (25 Jan 2022 17:30)      Anion Gap, Serum: 13 (01-26 @ 05:31)  Anion Gap, Serum: 11 (01-25 @ 04:29)      Calcium, Total Serum: 9.2 (01-26 @ 05:31)  Calcium, Total Serum: 10.0 (01-25 @ 04:29)  Albumin, Serum: 2.3 *L* (01-25 @ 04:29)    Alanine Aminotransferase (ALT/SGPT): <5 *L* (01-25 @ 04:29)  Alkaline Phosphatase, Serum: 88 (01-25 @ 04:29)  Aspartate Aminotransferase (AST/SGOT): 17 (01-25 @ 04:29)        01-26    136  |  99  |  51<H>  ----------------------------<  116<H>  4.1   |  24  |  2.86<H>    Ca    9.2      26 Jan 2022 05:31  Phos  2.7     01-26    TPro  7.6  /  Alb  2.3<L>  /  TBili  0.3  /  DBili  x   /  AST  17  /  ALT  <5<L>  /  AlkPhos  88  01-25                        8.6    7.46  )-----------( 281      ( 26 Jan 2022 05:31 )             27.0     Medications  MEDICATIONS  (STANDING):  atorvastatin 80 milliGRAM(s) Oral at bedtime  carbidopa/levodopa  25/100 2.5 Tablet(s) Oral <User Schedule>  carbidopa/levodopa  25/100 2 Tablet(s) Oral <User Schedule>  chlorhexidine 4% Liquid 1 Application(s) Topical <User Schedule>  cinacalcet 60 milliGRAM(s) Oral daily  dextrose 40% Gel 15 Gram(s) Oral once  dextrose 5%. 1000 milliLiter(s) (50 mL/Hr) IV Continuous <Continuous>  dextrose 5%. 1000 milliLiter(s) (100 mL/Hr) IV Continuous <Continuous>  dextrose 50% Injectable 25 Gram(s) IV Push once  dextrose 50% Injectable 12.5 Gram(s) IV Push once  dextrose 50% Injectable 25 Gram(s) IV Push once  diVALproex Sprinkle 250 milliGRAM(s) Oral three times a day  DULoxetine 20 milliGRAM(s) Oral daily  folic acid 1 milliGRAM(s) Oral daily  glucagon  Injectable 1 milliGRAM(s) IntraMuscular once  heparin   Injectable 5000 Unit(s) SubCutaneous every 12 hours  insulin glargine Injectable (LANTUS) 6 Unit(s) SubCutaneous at bedtime  insulin lispro (ADMELOG) corrective regimen sliding scale   SubCutaneous every 6 hours  levothyroxine 25 MICROGram(s) Oral daily  memantine 5 milliGRAM(s) Oral at bedtime  metoprolol tartrate 50 milliGRAM(s) Oral two times a day  midodrine 10 milliGRAM(s) Oral <User Schedule>  mirtazapine 7.5 milliGRAM(s) Oral daily  Nephro-celeste 1 Tablet(s) Oral daily  pantoprazole   Suspension 40 milliGRAM(s) Enteral Tube two times a day  QUEtiapine 25 milliGRAM(s) Oral at bedtime  trihexyphenidyl 2 milliGRAM(s) Oral three times a day      Physical Exam  General: Patient comfortable in bed  Vital Signs Last 12 Hrs  T(F): 98.1 (01-26-22 @ 10:58), Max: 98.6 (01-26-22 @ 04:55)  HR: 106 (01-26-22 @ 10:58) (105 - 106)  BP: 146/93 (01-26-22 @ 10:58) (146/93 - 150/88)  BP(mean): --  RR: 18 (01-26-22 @ 10:58) (18 - 19)  SpO2: 94% (01-26-22 @ 10:58) (93% - 94%)  Neck: No palpable thyroid nodules.  CVS: S1S2, No murmurs  Respiratory: No wheezing, no crepitations  GI: Abdomen soft, bowel sounds positive  Musculoskeletal:  edema lower extremities.   Skin: No skin rashes, no ecchymosis    Diagnostics           Chief complaint  Patient is a 71y old  Male who presents with a chief complaint of leg swelling (26 Jan 2022 10:27)   Review of systems  Patient in bed, looks comfortable, no hypoglycemic episodes.    Labs and Fingersticks  CAPILLARY BLOOD GLUCOSE      POCT Blood Glucose.: 199 mg/dL (26 Jan 2022 11:37)  POCT Blood Glucose.: 129 mg/dL (26 Jan 2022 05:24)  POCT Blood Glucose.: 144 mg/dL (25 Jan 2022 23:41)  POCT Blood Glucose.: 193 mg/dL (25 Jan 2022 21:03)  POCT Blood Glucose.: 127 mg/dL (25 Jan 2022 17:30)      Anion Gap, Serum: 13 (01-26 @ 05:31)  Anion Gap, Serum: 11 (01-25 @ 04:29)      Calcium, Total Serum: 9.2 (01-26 @ 05:31)  Calcium, Total Serum: 10.0 (01-25 @ 04:29)  Albumin, Serum: 2.3 *L* (01-25 @ 04:29)    Alanine Aminotransferase (ALT/SGPT): <5 *L* (01-25 @ 04:29)  Alkaline Phosphatase, Serum: 88 (01-25 @ 04:29)  Aspartate Aminotransferase (AST/SGOT): 17 (01-25 @ 04:29)        01-26    136  |  99  |  51<H>  ----------------------------<  116<H>  4.1   |  24  |  2.86<H>    Ca    9.2      26 Jan 2022 05:31  Phos  2.7     01-26    TPro  7.6  /  Alb  2.3<L>  /  TBili  0.3  /  DBili  x   /  AST  17  /  ALT  <5<L>  /  AlkPhos  88  01-25                        8.6    7.46  )-----------( 281      ( 26 Jan 2022 05:31 )             27.0     Medications  MEDICATIONS  (STANDING):  atorvastatin 80 milliGRAM(s) Oral at bedtime  carbidopa/levodopa  25/100 2.5 Tablet(s) Oral <User Schedule>  carbidopa/levodopa  25/100 2 Tablet(s) Oral <User Schedule>  chlorhexidine 4% Liquid 1 Application(s) Topical <User Schedule>  cinacalcet 60 milliGRAM(s) Oral daily  dextrose 40% Gel 15 Gram(s) Oral once  dextrose 5%. 1000 milliLiter(s) (50 mL/Hr) IV Continuous <Continuous>  dextrose 5%. 1000 milliLiter(s) (100 mL/Hr) IV Continuous <Continuous>  dextrose 50% Injectable 25 Gram(s) IV Push once  dextrose 50% Injectable 12.5 Gram(s) IV Push once  dextrose 50% Injectable 25 Gram(s) IV Push once  diVALproex Sprinkle 250 milliGRAM(s) Oral three times a day  DULoxetine 20 milliGRAM(s) Oral daily  folic acid 1 milliGRAM(s) Oral daily  glucagon  Injectable 1 milliGRAM(s) IntraMuscular once  heparin   Injectable 5000 Unit(s) SubCutaneous every 12 hours  insulin glargine Injectable (LANTUS) 6 Unit(s) SubCutaneous at bedtime  insulin lispro (ADMELOG) corrective regimen sliding scale   SubCutaneous every 6 hours  levothyroxine 25 MICROGram(s) Oral daily  memantine 5 milliGRAM(s) Oral at bedtime  metoprolol tartrate 50 milliGRAM(s) Oral two times a day  midodrine 10 milliGRAM(s) Oral <User Schedule>  mirtazapine 7.5 milliGRAM(s) Oral daily  Nephro-celeste 1 Tablet(s) Oral daily  pantoprazole   Suspension 40 milliGRAM(s) Enteral Tube two times a day  QUEtiapine 25 milliGRAM(s) Oral at bedtime  trihexyphenidyl 2 milliGRAM(s) Oral three times a day      Physical Exam  General: Patient comfortable in bed  Vital Signs Last 12 Hrs  T(F): 98.1 (01-26-22 @ 10:58), Max: 98.6 (01-26-22 @ 04:55)  HR: 106 (01-26-22 @ 10:58) (105 - 106)  BP: 146/93 (01-26-22 @ 10:58) (146/93 - 150/88)  BP(mean): --  RR: 18 (01-26-22 @ 10:58) (18 - 19)  SpO2: 94% (01-26-22 @ 10:58) (93% - 94%)  Neck: No palpable thyroid nodules.  CVS: S1S2, No murmurs  Respiratory: No wheezing, no crepitations  GI: Abdomen soft, bowel sounds positive  Musculoskeletal:  edema lower extremities.   Skin: No skin rashes, no ecchymosis    Diagnostics

## 2022-01-26 NOTE — PROGRESS NOTE ADULT - SUBJECTIVE AND OBJECTIVE BOX
[facility] [phone]        Patient seen and examined in bed.  He was in good spirits n more alert        MEDICATIONS  (STANDING):  atorvastatin 80 milliGRAM(s) Oral at bedtime  carbidopa/levodopa  25/100 2.5 Tablet(s) Oral <User Schedule>  carbidopa/levodopa  25/100 2 Tablet(s) Oral <User Schedule>  chlorhexidine 4% Liquid 1 Application(s) Topical <User Schedule>  cinacalcet 60 milliGRAM(s) Oral daily  dextrose 40% Gel 15 Gram(s) Oral once  dextrose 5%. 1000 milliLiter(s) (50 mL/Hr) IV Continuous <Continuous>  dextrose 5%. 1000 milliLiter(s) (100 mL/Hr) IV Continuous <Continuous>  dextrose 50% Injectable 25 Gram(s) IV Push once  dextrose 50% Injectable 12.5 Gram(s) IV Push once  dextrose 50% Injectable 25 Gram(s) IV Push once  diVALproex Sprinkle 250 milliGRAM(s) Oral three times a day  DULoxetine 20 milliGRAM(s) Oral daily  folic acid 1 milliGRAM(s) Oral daily  glucagon  Injectable 1 milliGRAM(s) IntraMuscular once  heparin   Injectable 5000 Unit(s) SubCutaneous every 12 hours  insulin glargine Injectable (LANTUS) 6 Unit(s) SubCutaneous at bedtime  insulin lispro (ADMELOG) corrective regimen sliding scale   SubCutaneous every 6 hours  levothyroxine 25 MICROGram(s) Oral daily  memantine 5 milliGRAM(s) Oral at bedtime  metoprolol tartrate 50 milliGRAM(s) Oral two times a day  midodrine 10 milliGRAM(s) Oral <User Schedule>  mirtazapine 7.5 milliGRAM(s) Oral daily  Nephro-celeste 1 Tablet(s) Oral daily  pantoprazole   Suspension 40 milliGRAM(s) Enteral Tube two times a day  trihexyphenidyl 2 milliGRAM(s) Oral three times a day      VITAL:  T(C): , Max: 37 (22 @ 04:55)  T(F): , Max: 98.6 (22 @ 04:55)  HR: 105 (22 @ 04:55)  BP: 150/88 (22 @ 04:55)  BP(mean): --  RR: 19 (22 @ 04:55)  SpO2: 93% (22 @ 04:55)  Wt(kg): --    I and O's:     @ 07:01  -   @ 07:00  --------------------------------------------------------  IN: 0 mL / OUT: 2050 mL / NET: -2050 mL          PHYSICAL EXAM:    Constitutional: NAD  Neck:  No JVD  Respiratory: CTAB/L  Cardiovascular: S1 and S2  Gastrointestinal: BS+, soft, NT/ND  Extremities: No peripheral edema  Neurological:   no focal deficits  Psychiatric: Normal mood, normal affect  : +  Javier  Skin: No rashes  Access: perm cath and avf     LABS:                        8.6    7.46  )-----------( 281      ( 2022 05:31 )             27.0         136  |  99  |  51<H>  ----------------------------<  116<H>  4.1   |  24  |  2.86<H>    Ca    9.2      2022 05:31  Phos  2.7         TPro  7.6  /  Alb  2.3<L>  /  TBili  0.3  /  DBili  x   /  AST  17  /  ALT  <5<L>  /  AlkPhos  88            Urine Studies:  Urinalysis Basic - ( 2022 18:49 )    Color: Yellow / Appearance: Slightly Turbid / S.008 / pH: x  Gluc: x / Ketone: Negative  / Bili: Negative / Urobili: Negative   Blood: x / Protein: 100 / Nitrite: Negative   Leuk Esterase: Large / RBC: 21 /hpf / WBC >50 /HPF   Sq Epi: x / Non Sq Epi: 1 / Bacteria: Many            RADIOLOGY & ADDITIONAL STUDIES:

## 2022-01-26 NOTE — PROGRESS NOTE ADULT - SUBJECTIVE AND OBJECTIVE BOX
Patient is a 71y Male     Patient is a 71y old  Male who presents with a chief complaint of leg swelling (25 Jan 2022 15:22)      HPI:  70yo M w/ PMHx of CAD (s/p CABG 2019), CKD (unknown stage), DM2, Parkinson's Disease, HTN, depression presents with bilateral leg swelling, patient's daughter in-law at bedside Yoly Quintero (4 [facility] Nurse) provides the history, the daughter reports the patient is a poor historian, unable to remember having conversations with others and likely cannot weigh risk/benefits of medical conditions, she reports that he has had bilateral lower extremity swelling for the past few months, but over the past 2 weeks it has significantly worsened, he has further difficulty ambulating due to swelling, his outpatient provider attempted to manage with 20mg lasix and then increased to 40mg lasix but the patient never took the increased dose, his symptoms ar persistent throughout the day and are extremely severe, he has developed wounds of the legs with weeping of fluid due to the swelling, she reports that the patient is frequently non-compliant with medications and medical management, he lives at home with his son and daughter in law, he denies chest pain, shortness of breath, fever/chills, cough, sputum, in the ED, he was tachycardic but hemodynamically stable, afebrile, saturating well on room air, labs were significant for elevated BNP, elevated creatinine, imaging showed moderate left pleural effusion, patient was admitted to general medicine for further management  (21 Oct 2021 07:06)      PAST MEDICAL & SURGICAL HISTORY:  Hypertension    Diabetes Mellitus, Type 2    Hypercholesterolemia    Parkinson disease    CAD (coronary artery disease)  s/p 1 stent in 2010 and CABG 2019    S/P CABG (coronary artery bypass graft)  2019    S/P hip replacement, right  2016    Anxiety    Depression, major        MEDICATIONS  (STANDING):  atorvastatin 80 milliGRAM(s) Oral at bedtime  carbidopa/levodopa  25/100 2.5 Tablet(s) Oral <User Schedule>  carbidopa/levodopa  25/100 2 Tablet(s) Oral <User Schedule>  chlorhexidine 4% Liquid 1 Application(s) Topical <User Schedule>  cinacalcet 60 milliGRAM(s) Oral daily  dextrose 40% Gel 15 Gram(s) Oral once  dextrose 5%. 1000 milliLiter(s) (50 mL/Hr) IV Continuous <Continuous>  dextrose 5%. 1000 milliLiter(s) (100 mL/Hr) IV Continuous <Continuous>  dextrose 50% Injectable 25 Gram(s) IV Push once  dextrose 50% Injectable 12.5 Gram(s) IV Push once  dextrose 50% Injectable 25 Gram(s) IV Push once  diVALproex Sprinkle 250 milliGRAM(s) Oral three times a day  DULoxetine 20 milliGRAM(s) Oral daily  folic acid 1 milliGRAM(s) Oral daily  glucagon  Injectable 1 milliGRAM(s) IntraMuscular once  heparin   Injectable 5000 Unit(s) SubCutaneous every 12 hours  insulin glargine Injectable (LANTUS) 6 Unit(s) SubCutaneous at bedtime  insulin lispro (ADMELOG) corrective regimen sliding scale   SubCutaneous every 6 hours  levothyroxine 25 MICROGram(s) Oral daily  memantine 5 milliGRAM(s) Oral at bedtime  metoprolol tartrate 50 milliGRAM(s) Oral two times a day  midodrine 10 milliGRAM(s) Oral <User Schedule>  mirtazapine 7.5 milliGRAM(s) Oral daily  Nephro-celeste 1 Tablet(s) Oral daily  pantoprazole   Suspension 40 milliGRAM(s) Enteral Tube two times a day  trihexyphenidyl 2 milliGRAM(s) Oral three times a day      Allergies    adhesives (Rash)  latex (Urticaria)  No Known Drug Allergies    Intolerances        SOCIAL HISTORY:  Denies ETOh,Smoking,     FAMILY HISTORY:  Family history of hypertension    Family history of malaria    Family history of pulmonary fibrosis (Sibling)        REVIEW OF SYSTEMS:    CONSTITUTIONAL: No weakness, fevers or chills  EYES/ENT: No visual changes;  No vertigo or throat pain   NECK: No pain or stiffness  RESPIRATORY: No cough, wheezing, hemoptysis; No shortness of breath  CARDIOVASCULAR: No chest pain or palpitations  GASTROINTESTINAL: No abdominal or epigastric pain. No nausea, vomiting, or hematemesis; No diarrhea or constipation. No melena or hematochezia.  GENITOURINARY: No dysuria, frequency or hematuria  NEUROLOGICAL: No numbness or weakness  SKIN: No itching, burning, rashes, or lesions   All other review of systems is negative unless indicated above.    VITAL:  T(C): , Max: 37 (01-26-22 @ 04:55)  T(F): , Max: 98.6 (01-26-22 @ 04:55)  HR: 105 (01-26-22 @ 04:55)  BP: 150/88 (01-26-22 @ 04:55)  BP(mean): --  RR: 19 (01-26-22 @ 04:55)  SpO2: 93% (01-26-22 @ 04:55)  Wt(kg): --    I and O's:    01-24 @ 07:01  -  01-25 @ 07:00  --------------------------------------------------------  IN: 0 mL / OUT: 1100 mL / NET: -1100 mL    01-25 @ 07:01  -  01-26 @ 07:00  --------------------------------------------------------  IN: 0 mL / OUT: 2050 mL / NET: -2050 mL          PHYSICAL EXAM:    Constitutional: NAD  HEENT: PERRLA,   Neck: No JVD  Respiratory: CTA B/L  Cardiovascular: S1 and S2  Gastrointestinal: BS+, soft, NT/ND  Extremities: No peripheral edema  Neurological: A/O x 3, no focal deficits  Psychiatric: Normal mood, normal affect  : No Javier  Skin: No rashes  Access: Not applicable  Back: No CVA tenderness    LABS:                        8.6    7.46  )-----------( 281      ( 26 Jan 2022 05:31 )             27.0     01-26    136  |  99  |  51<H>  ----------------------------<  116<H>  4.1   |  24  |  2.86<H>    Ca    9.2      26 Jan 2022 05:31  Phos  2.7     01-26    TPro  7.6  /  Alb  2.3<L>  /  TBili  0.3  /  DBili  x   /  AST  17  /  ALT  <5<L>  /  AlkPhos  88  01-25          RADIOLOGY & ADDITIONAL STUDIES:

## 2022-01-26 NOTE — PROGRESS NOTE ADULT - ASSESSMENT
Assessment  DMT2: 71y Male with DM T2 with hyperglycemia, A1C 6.4%, was on insulin at home, now on basal insulin and coverage, blood sugars are trending within overall acceptable range, no hypoglycemias, NAD, on tube feeds.  Hypothyroidism: Patient has no history thyroid disease, was not on any thyroid supplements, subclinical with low-normal FT4, lethargic, on synthroid 25 mcg po daily.  Hypercalcemia: Started on Sensipar 60mg daily, Ca improving.  CHF: on medications, stable, monitored.  HTN: Controlled,  on antihypertensive medications.  Parkinsons: on meds, monitored.  CKD: Monitor labs/BMP      Dr Ayers  Cell : [phone]   Assessment  DMT2: 71y Male with DM T2 with hyperglycemia, A1C 6.4%, was on insulin at home, now on basal insulin and coverage, blood sugars are trending within overall acceptable range, no hypoglycemias,  NAD, on tube feeds.  Hypothyroidism: Patient has no history thyroid disease, was not on any thyroid supplements, subclinical with low-normal FT4, lethargic, on synthroid 25 mcg po daily.  Hypercalcemia: Started on Sensipar 60mg daily, Ca improving.  CHF: on medications, stable, monitored.  HTN: Controlled,  on antihypertensive medications.  Parkinsons: on meds, monitored.  CKD: Monitor labs/BMP      Dr Ayers  Cell : [phone]

## 2022-01-27 NOTE — PROGRESS NOTE ADULT - ASSESSMENT
Assessment  DMT2: 71y Male with DM T2 with hyperglycemia, A1C 6.4%, was on insulin at home, now on basal insulin and coverage, blood sugars are stable and trending within acceptable range, no hypoglycemias, NAD, on tube feeds.  Hypothyroidism: Patient has no history thyroid disease, was not on any thyroid supplements, subclinical with low-normal FT4, lethargic, on synthroid 25 mcg po daily, repeat TFTs improved and euthyroid.  Hypercalcemia: Started on Sensipar 60mg daily, Ca improving.  CHF: on medications, stable, monitored.  HTN: Controlled,  on antihypertensive medications.  Parkinsons: on meds, monitored.  CKD: Monitor labs/BMP      Dr Ayers  Cell : [phone]   Assessment  DMT2: 71y Male with DM T2 with hyperglycemia, A1C 6.4%, was on insulin at home, now on basal insulin and coverage, blood sugars are stable and trending within acceptable range,  no hypoglycemias, NAD, on tube feeds.  Hypothyroidism: Patient has no history thyroid disease, was not on any thyroid supplements, subclinical with low-normal FT4, lethargic, on synthroid 25 mcg po daily, repeat TFTs improved and euthyroid.  Hypercalcemia: Started on Sensipar 60mg daily, Ca improving.  CHF: on medications, stable, monitored.  HTN: Controlled,  on antihypertensive medications.  Parkinsons: on meds, monitored.  CKD: Monitor labs/BMP      Dr Ayers  Cell : [phone]

## 2022-01-27 NOTE — PROGRESS NOTE ADULT - SUBJECTIVE AND OBJECTIVE BOX
Patient is a 71y old  Male who presents with a chief complaint of leg swelling (27 Jan 2022 10:38)      INTERVAL HPI/OVERNIGHT EVENTS: noted  pt seen and examined this am   events noted  mittens off for 24 hrs-pt calm and cooperative  permacath out      Vital Signs Last 24 Hrs  T(C): 36.4 (27 Jan 2022 11:42), Max: 37.1 (26 Jan 2022 20:51)  T(F): 97.6 (27 Jan 2022 11:42), Max: 98.7 (26 Jan 2022 20:51)  HR: 83 (27 Jan 2022 11:42) (80 - 85)  BP: 157/80 (27 Jan 2022 11:42) (144/85 - 166/82)  BP(mean): --  RR: 18 (27 Jan 2022 11:42) (18 - 18)  SpO2: 92% (27 Jan 2022 11:42) (91% - 93%)    acetaminophen     Tablet .. 650 milliGRAM(s) Oral every 6 hours PRN  albuterol/ipratropium for Nebulization 3 milliLiter(s) Nebulizer every 6 hours PRN  atorvastatin 80 milliGRAM(s) Oral at bedtime  carbidopa/levodopa  25/100 2.5 Tablet(s) Oral <User Schedule>  carbidopa/levodopa  25/100 2 Tablet(s) Oral <User Schedule>  chlorhexidine 4% Liquid 1 Application(s) Topical <User Schedule>  cinacalcet 60 milliGRAM(s) Oral daily  dextrose 40% Gel 15 Gram(s) Oral once  dextrose 5%. 1000 milliLiter(s) IV Continuous <Continuous>  dextrose 5%. 1000 milliLiter(s) IV Continuous <Continuous>  dextrose 50% Injectable 25 Gram(s) IV Push once  dextrose 50% Injectable 12.5 Gram(s) IV Push once  dextrose 50% Injectable 25 Gram(s) IV Push once  diVALproex Sprinkle 125 milliGRAM(s) Oral every 8 hours PRN  diVALproex Sprinkle 250 milliGRAM(s) Oral three times a day  DULoxetine 20 milliGRAM(s) Oral daily  epoetin mari-epbx (RETACRIT) Injectable 54406 Unit(s) IV Push once  folic acid 1 milliGRAM(s) Oral daily  glucagon  Injectable 1 milliGRAM(s) IntraMuscular once  guaiFENesin Oral Liquid (Sugar-Free) 200 milliGRAM(s) Oral every 6 hours PRN  heparin   Injectable 5000 Unit(s) SubCutaneous every 12 hours  insulin glargine Injectable (LANTUS) 6 Unit(s) SubCutaneous at bedtime  insulin lispro (ADMELOG) corrective regimen sliding scale   SubCutaneous every 6 hours  levothyroxine 25 MICROGram(s) Oral daily  memantine 5 milliGRAM(s) Oral at bedtime  metoprolol tartrate 50 milliGRAM(s) Oral two times a day  midodrine 10 milliGRAM(s) Oral <User Schedule>  mirtazapine 7.5 milliGRAM(s) Oral daily  Nephro-celeste 1 Tablet(s) Oral daily  ondansetron Injectable 4 milliGRAM(s) IV Push once PRN  pantoprazole   Suspension 40 milliGRAM(s) Enteral Tube two times a day  QUEtiapine 12.5 milliGRAM(s) Oral every 6 hours PRN  QUEtiapine 25 milliGRAM(s) Oral at bedtime  sodium chloride 0.9% lock flush 10 milliLiter(s) IV Push every 1 hour PRN  trihexyphenidyl 2 milliGRAM(s) Oral three times a day      PHYSICAL EXAM:  GENERAL: NAD,   EYES: conjunctiva and sclera clear  ENMT: Moist mucous membranes  NECK: Supple, No JVD, Normal thyroid  CHEST/LUNG: non labored, cta b/l  HEART: Regular rate and rhythm; No murmurs, rubs, or gallops  ABDOMEN: Soft, Nontender, Nondistended; Bowel sounds present  EXTREMITIES:  2+ Peripheral Pulses, No clubbing, cyanosis, or edema  LYMPH: No lymphadenopathy noted  SKIN: No rashes or lesions    Consultant(s) Notes Reviewed:  [x ] YES  [ ] NO  Care Discussed with Consultants/Other Providers [ x] YES  [ ] NO    LABS:                        8.6    7.46  )-----------( 281      ( 26 Jan 2022 05:31 )             27.0     01-26    136  |  99  |  51<H>  ----------------------------<  116<H>  4.1   |  24  |  2.86<H>    Ca    9.2      26 Jan 2022 05:31  Phos  2.5     01-27          CAPILLARY BLOOD GLUCOSE      POCT Blood Glucose.: 127 mg/dL (27 Jan 2022 12:36)  POCT Blood Glucose.: 171 mg/dL (27 Jan 2022 07:32)  POCT Blood Glucose.: 143 mg/dL (27 Jan 2022 04:51)  POCT Blood Glucose.: 133 mg/dL (27 Jan 2022 00:18)  POCT Blood Glucose.: 181 mg/dL (26 Jan 2022 21:39)  POCT Blood Glucose.: 140 mg/dL (26 Jan 2022 16:29)            Culture - Urine (collected 25 Jan 2022 00:53)  Source: Clean Catch Clean Catch (Midstream)  Preliminary Report (26 Jan 2022 06:41):    >100,000 CFU/ml Gram Negative Rods        RADIOLOGY & ADDITIONAL TESTS:    Imaging Personally Reviewed:  [x ] YES  [ ] NO

## 2022-01-27 NOTE — PROGRESS NOTE ADULT - ASSESSMENT
71M w/ HTN, DM2, CAD-CABG, Parkinson's disease, and advanced CKD, 10/21/21 p/w LE swelling, c/b COVID; now newly ESRD-HD as of this admission    (1)Renal - ESRD-HD TTS   (2)Hyponatremia - higher Na bath with dialysis    (3)Hypercalcemia - lower calcium bath with dialysis         RECOMMEND  (1) Next HD today   (2)   perm cath  is out   (3)Nutritional support         [facility] at HD     [facility]   [phone]

## 2022-01-27 NOTE — PROGRESS NOTE ADULT - SUBJECTIVE AND OBJECTIVE BOX
Chief Complaint:  Patient is a 71y old  Male who presents with a chief complaint of leg swelling (27 Jan 2022 12:46)      Date of service 01-27-22 @ 12:55      Interval Events:   Patient seen and examined. s/p [facility] Medications:  acetaminophen     Tablet .. 650 milliGRAM(s) Oral every 6 hours PRN  albuterol/ipratropium for Nebulization 3 milliLiter(s) Nebulizer every 6 hours PRN  atorvastatin 80 milliGRAM(s) Oral at bedtime  carbidopa/levodopa  25/100 2.5 Tablet(s) Oral <User Schedule>  carbidopa/levodopa  25/100 2 Tablet(s) Oral <User Schedule>  chlorhexidine 4% Liquid 1 Application(s) Topical <User Schedule>  cinacalcet 60 milliGRAM(s) Oral daily  dextrose 40% Gel 15 Gram(s) Oral once  dextrose 5%. 1000 milliLiter(s) IV Continuous <Continuous>  dextrose 5%. 1000 milliLiter(s) IV Continuous <Continuous>  dextrose 50% Injectable 25 Gram(s) IV Push once  dextrose 50% Injectable 12.5 Gram(s) IV Push once  dextrose 50% Injectable 25 Gram(s) IV Push once  diVALproex Sprinkle 125 milliGRAM(s) Oral every 8 hours PRN  diVALproex Sprinkle 250 milliGRAM(s) Oral three times a day  DULoxetine 20 milliGRAM(s) Oral daily  epoetin mari-epbx (RETACRIT) Injectable 11585 Unit(s) IV Push once  folic acid 1 milliGRAM(s) Oral daily  glucagon  Injectable 1 milliGRAM(s) IntraMuscular once  guaiFENesin Oral Liquid (Sugar-Free) 200 milliGRAM(s) Oral every 6 hours PRN  heparin   Injectable 5000 Unit(s) SubCutaneous every 12 hours  insulin glargine Injectable (LANTUS) 6 Unit(s) SubCutaneous at bedtime  insulin lispro (ADMELOG) corrective regimen sliding scale   SubCutaneous every 6 hours  levothyroxine 25 MICROGram(s) Oral daily  memantine 5 milliGRAM(s) Oral at bedtime  metoprolol tartrate 50 milliGRAM(s) Oral two times a day  midodrine 10 milliGRAM(s) Oral <User Schedule>  mirtazapine 7.5 milliGRAM(s) Oral daily  Nephro-celeste 1 Tablet(s) Oral daily  ondansetron Injectable 4 milliGRAM(s) IV Push once PRN  pantoprazole   Suspension 40 milliGRAM(s) Enteral Tube two times a day  QUEtiapine 12.5 milliGRAM(s) Oral every 6 hours PRN  QUEtiapine 25 milliGRAM(s) Oral at bedtime  sodium chloride 0.9% lock flush 10 milliLiter(s) IV Push every 1 hour PRN  trihexyphenidyl 2 milliGRAM(s) Oral three times a day        Review of Systems:  unable to obtain    PHYSICAL EXAM:   Vital Signs:  Vital Signs Last 24 Hrs  T(C): 36.4 (27 Jan 2022 11:42), Max: 37.1 (26 Jan 2022 20:51)  T(F): 97.6 (27 Jan 2022 11:42), Max: 98.7 (26 Jan 2022 20:51)  HR: 83 (27 Jan 2022 11:42) (80 - 85)  BP: 157/80 (27 Jan 2022 11:42) (144/85 - 166/82)  BP(mean): --  RR: 18 (27 Jan 2022 11:42) (18 - 18)  SpO2: 92% (27 Jan 2022 11:42) (91% - 93%)  Daily     Daily       PHYSICAL EXAM:     GENERAL:  Appears stated age, well-groomed, well-nourished, no distress  HEENT:  NC/AT,  conjunctivae anicteric, clear and pink,   NECK: supple, trachea midline  CHEST:  Full & symmetric excursion, no increased effort, breath sounds clear  HEART:  Regular rhythm, no JVD  ABDOMEN:  Soft, non-tender, non-distended, normoactive bowel sounds,  no masses , no hepatosplenomegaly, +gastrostomy   EXTREMITIES:  no cyanosis,clubbing or edema  SKIN:  No rash, erythema, or, ecchymoses, no jaundice  NEURO:  non-focal, no asterixis  RECTAL: Deferred      LABS Personally reviewed by me:                        8.6    7.46  )-----------( 281      ( 26 Jan 2022 05:31 )             27.0       01-26    136  |  99  |  51<H>  ----------------------------<  116<H>  4.1   |  24  |  2.86<H>    Ca    9.2      26 Jan 2022 05:31  Phos  2.5     01-27                                    8.6    7.46  )-----------( 281      ( 26 Jan 2022 05:31 )             27.0                         8.7    6.93  )-----------( 264      ( 25 Jan 2022 04:29 )             27.8       Imaging personally reviewed by me:

## 2022-01-27 NOTE — PROGRESS NOTE ADULT - SUBJECTIVE AND OBJECTIVE BOX
[facility] [phone]        Patient seen and examined in bed.  He was the same   Confused  Perm cath is out         MEDICATIONS  (STANDING):  atorvastatin 80 milliGRAM(s) Oral at bedtime  carbidopa/levodopa  25/100 2.5 Tablet(s) Oral <User Schedule>  carbidopa/levodopa  25/100 2 Tablet(s) Oral <User Schedule>  chlorhexidine 4% Liquid 1 Application(s) Topical <User Schedule>  cinacalcet 60 milliGRAM(s) Oral daily  dextrose 40% Gel 15 Gram(s) Oral once  dextrose 5%. 1000 milliLiter(s) (50 mL/Hr) IV Continuous <Continuous>  dextrose 5%. 1000 milliLiter(s) (100 mL/Hr) IV Continuous <Continuous>  dextrose 50% Injectable 25 Gram(s) IV Push once  dextrose 50% Injectable 12.5 Gram(s) IV Push once  dextrose 50% Injectable 25 Gram(s) IV Push once  diVALproex Sprinkle 250 milliGRAM(s) Oral three times a day  DULoxetine 20 milliGRAM(s) Oral daily  folic acid 1 milliGRAM(s) Oral daily  glucagon  Injectable 1 milliGRAM(s) IntraMuscular once  heparin   Injectable 5000 Unit(s) SubCutaneous every 12 hours  insulin glargine Injectable (LANTUS) 6 Unit(s) SubCutaneous at bedtime  insulin lispro (ADMELOG) corrective regimen sliding scale   SubCutaneous every 6 hours  levothyroxine 25 MICROGram(s) Oral daily  memantine 5 milliGRAM(s) Oral at bedtime  metoprolol tartrate 50 milliGRAM(s) Oral two times a day  midodrine 10 milliGRAM(s) Oral <User Schedule>  mirtazapine 7.5 milliGRAM(s) Oral daily  Nephro-celeste 1 Tablet(s) Oral daily  pantoprazole   Suspension 40 milliGRAM(s) Enteral Tube two times a day  QUEtiapine 25 milliGRAM(s) Oral at bedtime  trihexyphenidyl 2 milliGRAM(s) Oral three times a day      VITAL:  T(C): , Max: 37.1 (01-26-22 @ 20:51)  T(F): , Max: 98.7 (01-26-22 @ 20:51)  HR: 85 (01-27-22 @ 03:54)  BP: 144/85 (01-27-22 @ 03:54)  BP(mean): --  RR: 18 (01-27-22 @ 03:54)  SpO2: 92% (01-27-22 @ 03:54)  Wt(kg): --    I and O's:    01-26 @ 07:01  -  01-27 @ 07:00  --------------------------------------------------------  IN: 1350 mL / OUT: 350 mL / NET: 1000 mL          PHYSICAL EXAM:    Constitutional: NAD  Neck:  No JVD  Respiratory: CTAB/L  Cardiovascular: S1 and S2  Gastrointestinal: BS+, soft, NT/ND + peg   Extremities: No peripheral edema  Neurological: A/O x 3, no focal deficits  Psychiatric: Normal mood, normal affect  : No Javier  Skin: No rashes  Access: avf    LABS:                        8.6    7.46  )-----------( 281      ( 26 Jan 2022 05:31 )             27.0     01-26    136  |  99  |  51<H>  ----------------------------<  116<H>  4.1   |  24  |  2.86<H>    Ca    9.2      26 Jan 2022 05:31  Phos  2.5     01-27            Urine Studies:          RADIOLOGY & ADDITIONAL STUDIES:

## 2022-01-27 NOTE — PROGRESS NOTE ADULT - SUBJECTIVE AND OBJECTIVE BOX
Chief complaint  Patient is a 71y old  Male who presents with a chief complaint of leg swelling (27 Jan 2022 12:54)   Review of systems  Patient in bed, looks comfortable, no hypoglycemic episodes.    Labs and Fingersticks  CAPILLARY BLOOD GLUCOSE      POCT Blood Glucose.: 127 mg/dL (27 Jan 2022 12:36)  POCT Blood Glucose.: 171 mg/dL (27 Jan 2022 07:32)  POCT Blood Glucose.: 143 mg/dL (27 Jan 2022 04:51)  POCT Blood Glucose.: 133 mg/dL (27 Jan 2022 00:18)  POCT Blood Glucose.: 181 mg/dL (26 Jan 2022 21:39)      Anion Gap, Serum: 13 (01-26 @ 05:31)      Calcium, Total Serum: 9.2 (01-26 @ 05:31)          01-26    136  |  99  |  51<H>  ----------------------------<  116<H>  4.1   |  24  |  2.86<H>    Ca    9.2      26 Jan 2022 05:31  Phos  2.5     01-27                          8.6    7.46  )-----------( 281      ( 26 Jan 2022 05:31 )             27.0     Medications  MEDICATIONS  (STANDING):  atorvastatin 80 milliGRAM(s) Oral at bedtime  carbidopa/levodopa  25/100 2.5 Tablet(s) Oral <User Schedule>  carbidopa/levodopa  25/100 2 Tablet(s) Oral <User Schedule>  chlorhexidine 4% Liquid 1 Application(s) Topical <User Schedule>  cinacalcet 60 milliGRAM(s) Oral daily  dextrose 40% Gel 15 Gram(s) Oral once  dextrose 5%. 1000 milliLiter(s) (50 mL/Hr) IV Continuous <Continuous>  dextrose 5%. 1000 milliLiter(s) (100 mL/Hr) IV Continuous <Continuous>  dextrose 50% Injectable 25 Gram(s) IV Push once  dextrose 50% Injectable 12.5 Gram(s) IV Push once  dextrose 50% Injectable 25 Gram(s) IV Push once  diVALproex Sprinkle 250 milliGRAM(s) Oral three times a day  DULoxetine 20 milliGRAM(s) Oral daily  folic acid 1 milliGRAM(s) Oral daily  glucagon  Injectable 1 milliGRAM(s) IntraMuscular once  heparin   Injectable 5000 Unit(s) SubCutaneous every 12 hours  insulin glargine Injectable (LANTUS) 6 Unit(s) SubCutaneous at bedtime  insulin lispro (ADMELOG) corrective regimen sliding scale   SubCutaneous every 6 hours  levothyroxine 25 MICROGram(s) Oral daily  memantine 5 milliGRAM(s) Oral at bedtime  metoprolol tartrate 50 milliGRAM(s) Oral two times a day  midodrine 10 milliGRAM(s) Oral <User Schedule>  mirtazapine 7.5 milliGRAM(s) Oral daily  Nephro-celeste 1 Tablet(s) Oral daily  pantoprazole   Suspension 40 milliGRAM(s) Enteral Tube two times a day  QUEtiapine 25 milliGRAM(s) Oral at bedtime  trihexyphenidyl 2 milliGRAM(s) Oral three times a day      Physical Exam  General: Patient comfortable in bed  Vital Signs Last 12 Hrs  T(F): 97.7 (01-27-22 @ 14:04), Max: 97.7 (01-27-22 @ 14:04)  HR: 78 (01-27-22 @ 14:04) (78 - 83)  BP: 144/68 (01-27-22 @ 14:04) (144/68 - 157/80)  BP(mean): --  RR: 18 (01-27-22 @ 14:04) (18 - 18)  SpO2: 92% (01-27-22 @ 14:04) (92% - 93%)  Neck: No palpable thyroid nodules.  CVS: S1S2, No murmurs  Respiratory: No wheezing, no crepitations  GI: Abdomen soft, bowel sounds positive  Musculoskeletal:  edema lower extremities.   Skin: No skin rashes, no ecchymosis    Diagnostics             Chief complaint  Patient is a 71y old  Male who presents with a chief complaint of leg swelling (27 Jan 2022 12:54)   Review of systems  Patient in bed, looks comfortable, no hypoglycemic episodes.    Labs and Fingersticks  CAPILLARY BLOOD GLUCOSE      POCT Blood Glucose.: 127 mg/dL (27 Jan 2022 12:36)  POCT Blood Glucose.: 171 mg/dL (27 Jan 2022 07:32)  POCT Blood Glucose.: 143 mg/dL (27 Jan 2022 04:51)  POCT Blood Glucose.: 133 mg/dL (27 Jan 2022 00:18)  POCT Blood Glucose.: 181 mg/dL (26 Jan 2022 21:39)      Anion Gap, Serum: 13 (01-26 @ 05:31)      Calcium, Total Serum: 9.2 (01-26 @ 05:31)          01-26    136  |  99  |  51<H>  ----------------------------<  116<H>  4.1   |  24  |  2.86<H>    Ca    9.2      26 Jan 2022 05:31  Phos  2.5     01-27                          8.6    7.46  )-----------( 281      ( 26 Jan 2022 05:31 )             27.0     Medications  MEDICATIONS  (STANDING):  atorvastatin 80 milliGRAM(s) Oral at bedtime  carbidopa/levodopa  25/100 2.5 Tablet(s) Oral <User Schedule>  carbidopa/levodopa  25/100 2 Tablet(s) Oral <User Schedule>  chlorhexidine 4% Liquid 1 Application(s) Topical <User Schedule>  cinacalcet 60 milliGRAM(s) Oral daily  dextrose 40% Gel 15 Gram(s) Oral once  dextrose 5%. 1000 milliLiter(s) (50 mL/Hr) IV Continuous <Continuous>  dextrose 5%. 1000 milliLiter(s) (100 mL/Hr) IV Continuous <Continuous>  dextrose 50% Injectable 25 Gram(s) IV Push once  dextrose 50% Injectable 12.5 Gram(s) IV Push once  dextrose 50% Injectable 25 Gram(s) IV Push once  diVALproex Sprinkle 250 milliGRAM(s) Oral three times a day  DULoxetine 20 milliGRAM(s) Oral daily  folic acid 1 milliGRAM(s) Oral daily  glucagon  Injectable 1 milliGRAM(s) IntraMuscular once  heparin   Injectable 5000 Unit(s) SubCutaneous every 12 hours  insulin glargine Injectable (LANTUS) 6 Unit(s) SubCutaneous at bedtime  insulin lispro (ADMELOG) corrective regimen sliding scale   SubCutaneous every 6 hours  levothyroxine 25 MICROGram(s) Oral daily  memantine 5 milliGRAM(s) Oral at bedtime  metoprolol tartrate 50 milliGRAM(s) Oral two times a day  midodrine 10 milliGRAM(s) Oral <User Schedule>  mirtazapine 7.5 milliGRAM(s) Oral daily  Nephro-celeste 1 Tablet(s) Oral daily  pantoprazole   Suspension 40 milliGRAM(s) Enteral Tube two times a day  QUEtiapine 25 milliGRAM(s) Oral at bedtime  trihexyphenidyl 2 milliGRAM(s) Oral three times a day      Physical Exam  General: Patient comfortable in bed  Vital Signs Last 12 Hrs  T(F): 97.7 (01-27-22 @ 14:04), Max: 97.7 (01-27-22 @ 14:04)  HR: 78 (01-27-22 @ 14:04) (78 - 83)  BP: 144/68 (01-27-22 @ 14:04) (144/68 - 157/80)  BP(mean): --  RR: 18 (01-27-22 @ 14:04) (18 - 18)  SpO2: 92% (01-27-22 @ 14:04) (92% - 93%)  Neck: No palpable thyroid nodules.  CVS: S1S2, No murmurs  Respiratory: No wheezing, no crepitations  GI: Abdomen soft, bowel sounds positive  Musculoskeletal:  edema lower extremities.   Skin: No skin rashes, no ecchymosis    Diagnostics

## 2022-01-27 NOTE — PROGRESS NOTE ADULT - ASSESSMENT
71 M w volume overload, GI consulted for drop in hgb    1. CHF per cardio    2. ABBY on CKD per renal  -on HD via permacath per renal, midodrine during dialysis  -s/p L AVF, awaiting maturation     3. Drop in hgb, no overt GI Bleed, no bleeding around PEG site  -anemia receiving Retacrit   -continue to trend CBCs  -1/25, hgb 8.7     4. RP bleed  s/p Abdominal Angiography and Embolization on 10/29/2021 by Interventional radiology of l4and l5 lumbar artery     5. Failure to thrive  -s/p PEG 1/10    -maintain abdominal binder, mittens off pt remains calm   -tolerating TF, continue TF    6. stress duodenal ulcers  -PPI BID    7. Constipation  -resolved, last BM 1/25  -nonobstructive gas pattern on x-ray 1/13  -continue Miralax BID  -DC planning to rehab underway     Attending supervision statement: I have personally seen and examined the patient. I fully participated in the care of this patient. I have made amendments to the documentation where necessary, and agree with the history, physical exam, and plan as outlined by the ACP.    [facility]  Gastroenterology and Hepatology  [address] [place], [place]  Office: [phone]  Cell: [phone]

## 2022-01-27 NOTE — PROGRESS NOTE ADULT - ASSESSMENT
70yo M w/ PMHx of CAD (s/p CABG 2019), CKD (unknown stage), DM2, Parkinson's Disease, HTN, depression presents with new onset acute heart failure exacerbation, NSTEMI, started on hep gtt, developed bl RP bleed, acute anemia. sp MICU course for hemorrhagic shock, 10/28 sp IR embolization l4 and l5 lumbar artery. sp permacath and AVF.    # chronic systolic and diastolic heart failure  # ESRD, new HD  # Atrial flutter  # Parkinson's disease   # delirium  # CAD (coronary artery disease)  # HTN  # DM2 (diabetes mellitus, type 2)  # FTT sp PEG   midodrine during dialysis  sp L AVF using for HD, per renal permacath can be dced, sp permacath d kassy  holding Seroquel for lethargy, now improved, CT head no acute finding, known meningioma  psych fu noted for seroquel adjustment, fu recs  ID following, no indication for abx  namenda, sinemet  cymbalta, remeron  trihexyphenidyl  insulin per endo  lopressor, atorvastatin  tube feeds, abd binder    DVT ppx hsq    DNR/DNI    dc planning  pt medically and psych wise optimized for discharge to rehab  awaiting rehab placement    [facility]  [phone]

## 2022-01-27 NOTE — PROGRESS NOTE ADULT - PROBLEM SELECTOR PLAN 3
Will continue current  synthroid dose. Will continue monitoring TFTs and FU.  Suggest to FU outpatient, repeat TFTs in 4-6 weeks.

## 2022-01-28 NOTE — PROGRESS NOTE ADULT - SUBJECTIVE AND OBJECTIVE BOX
NEPHROLOGY[facility] [phone]        Patient seen and examined in bed.  He was the same         MEDICATIONS  (STANDING):  atorvastatin 80 milliGRAM(s) Oral at bedtime  carbidopa/levodopa  25/100 2.5 Tablet(s) Oral <User Schedule>  carbidopa/levodopa  25/100 2 Tablet(s) Oral <User Schedule>  chlorhexidine 4% Liquid 1 Application(s) Topical <User Schedule>  cinacalcet 60 milliGRAM(s) Oral daily  dextrose 40% Gel 15 Gram(s) Oral once  dextrose 5%. 1000 milliLiter(s) (50 mL/Hr) IV Continuous <Continuous>  dextrose 5%. 1000 milliLiter(s) (100 mL/Hr) IV Continuous <Continuous>  dextrose 50% Injectable 25 Gram(s) IV Push once  dextrose 50% Injectable 12.5 Gram(s) IV Push once  dextrose 50% Injectable 25 Gram(s) IV Push once  diVALproex Sprinkle 250 milliGRAM(s) Oral three times a day  DULoxetine 20 milliGRAM(s) Oral daily  folic acid 1 milliGRAM(s) Oral daily  glucagon  Injectable 1 milliGRAM(s) IntraMuscular once  heparin   Injectable 5000 Unit(s) SubCutaneous every 12 hours  insulin glargine Injectable (LANTUS) 6 Unit(s) SubCutaneous at bedtime  insulin lispro (ADMELOG) corrective regimen sliding scale   SubCutaneous every 6 hours  levothyroxine 25 MICROGram(s) Oral daily  memantine 5 milliGRAM(s) Oral at bedtime  metoprolol tartrate 50 milliGRAM(s) Oral two times a day  midodrine 10 milliGRAM(s) Oral <User Schedule>  mirtazapine 7.5 milliGRAM(s) Oral daily  Nephro-celeste 1 Tablet(s) Oral daily  pantoprazole   Suspension 40 milliGRAM(s) Enteral Tube two times a day  QUEtiapine 25 milliGRAM(s) Oral at bedtime  trihexyphenidyl 2 milliGRAM(s) Oral three times a day      VITAL:  T(C): , Max: 36.5 (01-27-22 @ 14:04)  T(F): , Max: 97.7 (01-27-22 @ 14:04)  HR: 98 (01-28-22 @ 11:11)  BP: 150/76 (01-28-22 @ 11:11)  BP(mean): --  RR: 18 (01-28-22 @ 11:11)  SpO2: 91% (01-28-22 @ 11:11)  Wt(kg): --    I and O's:    01-27 @ 07:01  -  01-28 @ 07:00  --------------------------------------------------------  IN: 330 mL / OUT: 1300 mL / NET: -970 mL          PHYSICAL EXAM:    Constitutional: NAD  Neck:  No JVD  Respiratory: CTAB/L  Cardiovascular: S1 and S2  Gastrointestinal: BS+, soft, NT/ND  Extremities: No peripheral edema  Neurological: A/O x 3, no focal deficits  Psychiatric: Normal mood, normal affect  : No Javier  Skin: No rashes  Access: avf    LABS:      Phos  2.5     01-27            Urine Studies:          RADIOLOGY & ADDITIONAL STUDIES:

## 2022-01-28 NOTE — PROGRESS NOTE ADULT - ASSESSMENT
71 M w volume overload, GI consulted for drop in hgb    1. CHF per cardio    2. ABBY on CKD per renal  -on HD via permacath per renal, midodrine during dialysis  -s/p L AVF, awaiting maturation     3. RP bleed  s/p Abdominal Angiography and Embolization on 10/29/2021 by Interventional radiology of l4and l5 lumbar artery     4. Failure to thrive  -s/p PEG 1/10    -maintain abdominal binder  -tolerating TF, continue TF  - aspiration precautions    5. stress duodenal ulcers  -PPI BID    6. Constipation  -resolved  -nonobstructive gas pattern on x-ray 1/13  -continue Miralax BID  -DC planning to rehab underway     Attending supervision statement: I have personally seen and examined the patient. I fully participated in the care of this patient. I have made amendments to the documentation where necessary, and agree with the history, physical exam, and plan as outlined by the ACP.    [facility]  Gastroenterology and Hepatology  [address] [place], [place]  Office: [phone]  Cell: [phone]

## 2022-01-28 NOTE — PROGRESS NOTE ADULT - SUBJECTIVE AND OBJECTIVE BOX
INTERVAL HPI/OVERNIGHT EVENTS:  Pt seen and examined. Tolerating PEG feeds. no new GI events/complaints     MEDICATIONS  (STANDING):  atorvastatin 80 milliGRAM(s) Oral at bedtime  carbidopa/levodopa  25/100 2.5 Tablet(s) Oral <User Schedule>  carbidopa/levodopa  25/100 2 Tablet(s) Oral <User Schedule>  chlorhexidine 4% Liquid 1 Application(s) Topical <User Schedule>  cinacalcet 60 milliGRAM(s) Oral daily  dextrose 40% Gel 15 Gram(s) Oral once  dextrose 5%. 1000 milliLiter(s) (50 mL/Hr) IV Continuous <Continuous>  dextrose 5%. 1000 milliLiter(s) (100 mL/Hr) IV Continuous <Continuous>  dextrose 50% Injectable 25 Gram(s) IV Push once  dextrose 50% Injectable 12.5 Gram(s) IV Push once  dextrose 50% Injectable 25 Gram(s) IV Push once  diVALproex Sprinkle 250 milliGRAM(s) Oral three times a day  DULoxetine 20 milliGRAM(s) Oral daily  folic acid 1 milliGRAM(s) Oral daily  glucagon  Injectable 1 milliGRAM(s) IntraMuscular once  heparin   Injectable 5000 Unit(s) SubCutaneous every 12 hours  insulin glargine Injectable (LANTUS) 6 Unit(s) SubCutaneous at bedtime  insulin lispro (ADMELOG) corrective regimen sliding scale   SubCutaneous every 6 hours  levothyroxine 25 MICROGram(s) Oral daily  memantine 5 milliGRAM(s) Oral at bedtime  metoprolol tartrate 50 milliGRAM(s) Oral two times a day  midodrine 10 milliGRAM(s) Oral <User Schedule>  mirtazapine 7.5 milliGRAM(s) Oral daily  Nephro-celeste 1 Tablet(s) Oral daily  pantoprazole   Suspension 40 milliGRAM(s) Enteral Tube two times a day  QUEtiapine 25 milliGRAM(s) Oral at bedtime  trihexyphenidyl 2 milliGRAM(s) Oral three times a day    MEDICATIONS  (PRN):  acetaminophen     Tablet .. 650 milliGRAM(s) Oral every 6 hours PRN Mild Pain (1 - 3)  albuterol/ipratropium for Nebulization 3 milliLiter(s) Nebulizer every 6 hours PRN Shortness of Breath and/or Wheezing  diVALproex Sprinkle 125 milliGRAM(s) Oral every 8 hours PRN Agitation  guaiFENesin Oral Liquid (Sugar-Free) 200 milliGRAM(s) Oral every 6 hours PRN Cough  ondansetron Injectable 4 milliGRAM(s) IV Push once PRN Nausea and/or Vomiting  QUEtiapine 12.5 milliGRAM(s) Oral every 6 hours PRN agitation  sodium chloride 0.9% lock flush 10 milliLiter(s) IV Push every 1 hour PRN Pre/post blood products, medications, blood draw, and to maintain line patency      Allergies    adhesives (Rash)  latex (Urticaria)  No Known Drug Allergies    Intolerances        Review of Systems:  unable to obtain    Vital Signs Last 24 Hrs  T(C): 36.4 (28 Jan 2022 04:23), Max: 36.5 (27 Jan 2022 14:04)  T(F): 97.6 (28 Jan 2022 04:23), Max: 97.7 (27 Jan 2022 14:04)  HR: 100 (28 Jan 2022 10:05) (61 - 100)  BP: 138/81 (28 Jan 2022 10:05) (138/81 - 157/80)  BP(mean): --  RR: 18 (28 Jan 2022 10:05) (18 - 18)  SpO2: 90% (28 Jan 2022 10:05) (90% - 100%)    PHYSICAL EXAM:    Constitutional: NAD, well-developed  HEENT: EOMI, throat clear  Neck: No LAD, supple  Respiratory: CTA and P  Cardiovascular: S1 and S2, RRR, no M  Gastrointestinal: BS+, soft, NT/ND, neg HSM,  Extremities: No peripheral edema, neg clubbing, cyanosis  Vascular: 2+ peripheral pulses  Neurological: A/O x 3, no focal deficits  Psychiatric: Normal mood, normal affect  Skin: No rashes      LABS:      Phos  2.5     01-27            RADIOLOGY & ADDITIONAL TESTS:

## 2022-01-28 NOTE — PROGRESS NOTE ADULT - ASSESSMENT
71M w/ HTN, DM2, CAD-CABG, Parkinson's disease, and advanced CKD, 10/21/21 p/w LE swelling, c/b COVID; now newly ESRD-HD as of this admission    (1)Renal - ESRD-HD TTS   (2)Hyponatremia - higher Na bath with dialysis    (3)Hypercalcemia - lower calcium bath with dialysis         RECOMMEND  (1) Next HD in am   (2)   perm cath  is out   (3)Nutritional support       DC planning   Retacrit at HD     [facility]   [phone]

## 2022-01-28 NOTE — PROGRESS NOTE ADULT - ASSESSMENT
Assessment  DMT2: 71y Male with DM T2 with hyperglycemia, A1C 6.4%, was on insulin at home, now on basal insulin and coverage, blood sugars are stable and trending within acceptable range, no hypoglycemias, NAD, on tube feeds.  Hypothyroidism: Patient has no history thyroid disease, was not on any thyroid supplements, subclinical with low-normal FT4, lethargic, on synthroid 25 mcg po daily, repeat TFTs improved and euthyroid.  Hypercalcemia: Started on Sensipar 60mg daily, Ca improving.  CHF: on medications, stable, monitored.  HTN: Controlled,  on antihypertensive medications.  Parkinsons: on meds, monitored.  CKD: Monitor labs/BMP      Dr Ayers  Cell : [phone]   Assessment  DMT2: 71y Male with DM T2 with hyperglycemia, A1C 6.4%, was on insulin at home, now on basal insulin and coverage, blood sugars are stable and trending within acceptable range,  no hypoglycemias, NAD, on tube feeds.  Hypothyroidism: Patient has no history thyroid disease, was not on any thyroid supplements, subclinical with low-normal FT4, lethargic, on synthroid 25 mcg po daily, repeat TFTs improved and euthyroid.  Hypercalcemia: Started on Sensipar 60mg daily, Ca improving.  CHF: on medications, stable, monitored.  HTN: Controlled,  on antihypertensive medications.  Parkinsons: on meds, monitored.  CKD: Monitor labs/BMP      Dr Ayers  Cell : [phone]

## 2022-01-29 NOTE — PROGRESS NOTE ADULT - SUBJECTIVE AND OBJECTIVE BOX
Patient is a 71y old  Male who presents with a chief complaint of leg swelling (29 Jan 2022 12:01)      INTERVAL HPI/OVERNIGHT EVENTS: noted  pt seen and examined this am   events noted  lt AVF unable to be accessed for HD      Vital Signs Last 24 Hrs  T(C): 36.9 (29 Jan 2022 11:27), Max: 36.9 (29 Jan 2022 09:45)  T(F): 98.4 (29 Jan 2022 11:27), Max: 98.4 (29 Jan 2022 09:45)  HR: 84 (29 Jan 2022 11:27) (84 - 108)  BP: 157/80 (29 Jan 2022 11:27) (152/83 - 162/85)  BP(mean): --  RR: 18 (29 Jan 2022 11:27) (18 - 18)  SpO2: 92% (29 Jan 2022 11:27) (91% - 94%)    acetaminophen     Tablet .. 650 milliGRAM(s) Oral every 6 hours PRN  albuterol/ipratropium for Nebulization 3 milliLiter(s) Nebulizer every 6 hours PRN  atorvastatin 80 milliGRAM(s) Oral at bedtime  carbidopa/levodopa  25/100 2.5 Tablet(s) Oral <User Schedule>  carbidopa/levodopa  25/100 2 Tablet(s) Oral <User Schedule>  chlorhexidine 4% Liquid 1 Application(s) Topical <User Schedule>  cinacalcet 60 milliGRAM(s) Oral daily  dextrose 40% Gel 15 Gram(s) Oral once  dextrose 5%. 1000 milliLiter(s) IV Continuous <Continuous>  dextrose 5%. 1000 milliLiter(s) IV Continuous <Continuous>  dextrose 50% Injectable 25 Gram(s) IV Push once  dextrose 50% Injectable 12.5 Gram(s) IV Push once  dextrose 50% Injectable 25 Gram(s) IV Push once  diVALproex Sprinkle 125 milliGRAM(s) Oral every 8 hours PRN  diVALproex Sprinkle 250 milliGRAM(s) Oral three times a day  DULoxetine 20 milliGRAM(s) Oral daily  folic acid 1 milliGRAM(s) Oral daily  glucagon  Injectable 1 milliGRAM(s) IntraMuscular once  guaiFENesin Oral Liquid (Sugar-Free) 200 milliGRAM(s) Oral every 6 hours PRN  heparin   Injectable 5000 Unit(s) SubCutaneous every 12 hours  insulin glargine Injectable (LANTUS) 6 Unit(s) SubCutaneous at bedtime  insulin lispro (ADMELOG) corrective regimen sliding scale   SubCutaneous every 6 hours  levothyroxine 25 MICROGram(s) Oral daily  memantine 5 milliGRAM(s) Oral at bedtime  metoprolol tartrate 50 milliGRAM(s) Oral two times a day  midodrine 10 milliGRAM(s) Oral <User Schedule>  mirtazapine 7.5 milliGRAM(s) Oral daily  Nephro-celeste 1 Tablet(s) Oral daily  ondansetron Injectable 4 milliGRAM(s) IV Push once PRN  pantoprazole   Suspension 40 milliGRAM(s) Enteral Tube two times a day  QUEtiapine 12.5 milliGRAM(s) Oral every 6 hours PRN  QUEtiapine 25 milliGRAM(s) Oral at bedtime  sodium chloride 0.9% lock flush 10 milliLiter(s) IV Push every 1 hour PRN  trihexyphenidyl 2 milliGRAM(s) Oral three times a day      PHYSICAL EXAM:  GENERAL: NAD,   EYES: conjunctiva and sclera clear  ENMT: Moist mucous membranes  NECK: Supple, No JVD, Normal thyroid  CHEST/LUNG: non labored, cta b/l  HEART: Regular rate and rhythm; No murmurs, rubs, or gallops  ABDOMEN: Soft, Nontender, Nondistended; Bowel sounds present  EXTREMITIES:  2+ Peripheral Pulses, No clubbing, cyanosis, or edema, Lt AVF  LYMPH: No lymphadenopathy noted  SKIN: No rashes or lesions    Consultant(s) Notes Reviewed:  [x ] YES  [ ] NO  Care Discussed with Consultants/Other Providers [ x] YES  [ ] NO    LABS:                        9.6    10.03 )-----------( 356      ( 29 Jan 2022 06:37 )             31.0     01-29    132<L>  |  94<L>  |  60<H>  ----------------------------<  103<H>  3.7   |  26  |  3.13<H>    Ca    9.7      29 Jan 2022 06:37          CAPILLARY BLOOD GLUCOSE      POCT Blood Glucose.: 179 mg/dL (29 Jan 2022 16:54)  POCT Blood Glucose.: 182 mg/dL (29 Jan 2022 11:56)  POCT Blood Glucose.: 124 mg/dL (29 Jan 2022 07:30)  POCT Blood Glucose.: 266 mg/dL (29 Jan 2022 00:28)  POCT Blood Glucose.: 263 mg/dL (28 Jan 2022 21:46)              RADIOLOGY & ADDITIONAL TESTS:    Imaging Personally Reviewed:  [x ] YES  [ ] NO

## 2022-01-29 NOTE — PROGRESS NOTE ADULT - ASSESSMENT
70yo M w/ PMHx of CAD (s/p CABG 2019), CKD (unknown stage), DM2, Parkinson's Disease, HTN, depression presents with new onset acute heart failure exacerbation, NSTEMI, started on hep gtt, developed bl RP bleed, acute anemia. sp MICU course for hemorrhagic shock, 10/28 sp IR embolization l4 and l5 lumbar artery. sp permacath and AVF.    # chronic systolic and diastolic heart failure  # ESRD, new HD  # Atrial flutter  # Parkinson's disease   # delirium  # CAD (coronary artery disease)  # HTN  # DM2 (diabetes mellitus, type 2)  # FTT sp PEG   midodrine during dialysis  sp L AVF using for HD, permacath dced  #1/29 malfunctioning Lt AVF- IR eval noted, vascular eval pending-US LT UE  holding Seroquel for lethargy, now improved, CT head no acute finding, known meningioma  psych fu noted for seroquel adjustment, fu recs  ID following, no indication for abx  namenda, sinemet  cymbalta, remeron  trihexyphenidyl  insulin per endo  lopressor, atorvastatin  tube feeds, abd binder    DVT ppx hsq    DNR/DNI    dc planning  awaiting rehab placement pending malfunctioning AVF eval    [facility]  [phone]

## 2022-01-29 NOTE — PROGRESS NOTE ADULT - ASSESSMENT
Assessment  DMT2: 71y Male with DM T2 with hyperglycemia, A1C 6.4%, was on insulin at home, now on basal insulin and coverage, blood sugars are improving,  no hypoglycemias, NAD, on tube feeds.  Hypothyroidism: Patient has no history thyroid disease, was not on any thyroid supplements, subclinical with low-normal FT4, lethargic, on synthroid 25 mcg po daily, repeat TFTs improved and euthyroid.  Hypercalcemia: Started on Sensipar 60mg daily, Ca improving.  CHF: on medications, stable, monitored.  HTN: Controlled,  on antihypertensive medications.  Parkinsons: on meds, monitored.  CKD: Monitor labs/BMP      Dr Ayers  Cell : [phone]

## 2022-01-29 NOTE — PROGRESS NOTE ADULT - ASSESSMENT
71M w/ HTN, DM2, CAD-CABG, Parkinson's disease, and advanced CKD, 10/21/21 p/w LE swelling, c/b COVID; now newly ESRD-HD as of this admission    (1)Renal - ESRD-HD TTS - last dialyzed Thursday, unable to have HD today  (2)Hyponatremia - higher Na+ bath with dialysis    (3)Hypercalcemia - lower calcium bath with dialysis     RECOMMEND  (1)Fistulogram Monday then will have HD  (2)Nutritional support     D/w Dr. Rodriguez Friedman, NP-C  [facility]  [phone]

## 2022-01-29 NOTE — PROGRESS NOTE ADULT - SUBJECTIVE AND OBJECTIVE BOX
Chief complaint    Patient is a 71y old  Male who presents with a chief complaint of leg swelling (29 Jan 2022 17:53)   Review of systems  Patient in bed, appears comfortable.    Labs and Fingersticks  CAPILLARY BLOOD GLUCOSE      POCT Blood Glucose.: 179 mg/dL (29 Jan 2022 16:54)  POCT Blood Glucose.: 182 mg/dL (29 Jan 2022 11:56)  POCT Blood Glucose.: 124 mg/dL (29 Jan 2022 07:30)  POCT Blood Glucose.: 266 mg/dL (29 Jan 2022 00:28)  POCT Blood Glucose.: 263 mg/dL (28 Jan 2022 21:46)      Anion Gap, Serum: 12 (01-29 @ 06:37)      Calcium, Total Serum: 9.7 (01-29 @ 06:37)          01-29    132<L>  |  94<L>  |  60<H>  ----------------------------<  103<H>  3.7   |  26  |  3.13<H>    Ca    9.7      29 Jan 2022 06:37                          9.6    10.03 )-----------( 356      ( 29 Jan 2022 06:37 )             31.0     Medications  MEDICATIONS  (STANDING):  atorvastatin 80 milliGRAM(s) Oral at bedtime  carbidopa/levodopa  25/100 2.5 Tablet(s) Oral <User Schedule>  carbidopa/levodopa  25/100 2 Tablet(s) Oral <User Schedule>  chlorhexidine 4% Liquid 1 Application(s) Topical <User Schedule>  cinacalcet 60 milliGRAM(s) Oral daily  dextrose 40% Gel 15 Gram(s) Oral once  dextrose 5%. 1000 milliLiter(s) (50 mL/Hr) IV Continuous <Continuous>  dextrose 5%. 1000 milliLiter(s) (100 mL/Hr) IV Continuous <Continuous>  dextrose 50% Injectable 25 Gram(s) IV Push once  dextrose 50% Injectable 12.5 Gram(s) IV Push once  dextrose 50% Injectable 25 Gram(s) IV Push once  diVALproex Sprinkle 250 milliGRAM(s) Oral three times a day  DULoxetine 20 milliGRAM(s) Oral daily  folic acid 1 milliGRAM(s) Oral daily  glucagon  Injectable 1 milliGRAM(s) IntraMuscular once  heparin   Injectable 5000 Unit(s) SubCutaneous every 12 hours  insulin glargine Injectable (LANTUS) 6 Unit(s) SubCutaneous at bedtime  insulin lispro (ADMELOG) corrective regimen sliding scale   SubCutaneous every 6 hours  levothyroxine 25 MICROGram(s) Oral daily  memantine 5 milliGRAM(s) Oral at bedtime  metoprolol tartrate 50 milliGRAM(s) Oral two times a day  midodrine 10 milliGRAM(s) Oral <User Schedule>  mirtazapine 7.5 milliGRAM(s) Oral daily  Nephro-celeste 1 Tablet(s) Oral daily  pantoprazole   Suspension 40 milliGRAM(s) Enteral Tube two times a day  QUEtiapine 25 milliGRAM(s) Oral at bedtime  trihexyphenidyl 2 milliGRAM(s) Oral three times a day      Physical Exam  General: Patient comfortable in bed  Vital Signs Last 12 Hrs  T(F): 97.7 (01-29-22 @ 21:05), Max: 98.4 (01-29-22 @ 09:45)  HR: 93 (01-29-22 @ 21:05) (84 - 104)  BP: 144/89 (01-29-22 @ 21:05) (144/89 - 157/80)  BP(mean): --  RR: 18 (01-29-22 @ 21:05) (18 - 18)  SpO2: 94% (01-29-22 @ 21:05) (92% - 94%)  Neck: No palpable thyroid nodules.  CVS: S1S2, No murmurs  Respiratory: No wheezing, no crepitations  GI: Abdomen soft, bowel sounds positive  Musculoskeletal:  edema lower extremities.     Diagnostics    Free Thyroxine, Serum: AM Sched. Collection: 27-Jan-2022 06:00 (01-26 @ 08:06)  Thyroid Stimulating Hormone, Serum: AM Sched. Collection: 27-Jan-2022 06:00 (01-26 @ 08:06)  Free Thyroxine, Serum: AM Sched. Collection: 19-Jan-2022 06:00 (01-18 @ 15:42)  Cortisol AM, Serum: AM Sched. Collection: 15-Paul-2022 06:00 (01-14 @ 08:01)  Free Thyroxine, Serum: AM Sched. Collection: 15-Paul-2022 06:00 (01-14 @ 08:01)  Thyroid Stimulating Hormone, Serum: AM Sched. Collection: 15-Paul-2022 06:00 (01-14 @ 08:01)

## 2022-01-29 NOTE — CONSULT NOTE ADULT - SUBJECTIVE AND OBJECTIVE BOX
HPI:  70yo M w/ PMHx of CAD (s/p CABG 2019), CKD (unknown stage), DM2, Parkinson's Disease, HTN, depression, Left arm AVF which is malfunctioning--unable to use for HD.     Allergies:adhesives (Rash)  latex (Urticaria)  No Known Drug Allergies      PAST MEDICAL & SURGICAL HISTORY:  Hypertension    Diabetes Mellitus, Type 2    Hypercholesterolemia    Parkinson disease    CAD (coronary artery disease)  s/p 1 stent in 2010 and CABG 2019    S/P CABG (coronary artery bypass graft)  2019    S/P hip replacement, right  2016    Anxiety    Depression, major    Pertinent labs:                      9.6    10.03 )-----------( 356      ( 29 Jan 2022 06:37 )             31.0   01-29    132<L>  |  94<L>  |  60<H>  ----------------------------<  103<H>  3.7   |  26  |  3.13<H>    Ca    9.7      29 Jan 2022 06:37      A/P:  -Please obtain L AVF vascular US to assess if there is thrombosis or stenosis  -Once US obtained, page IR to review and determine plan for patient  -If patient needs dialysis in the interim, please consult vascular for bedside Shiley placement             HPI:  72yo M w/ PMHx of CAD (s/p CABG 2019), CKD (unknown stage), DM2, Parkinson's Disease, HTN, depression, Left arm AVF which is malfunctioning--unable to use for HD.     Allergies:adhesives (Rash)  latex (Urticaria)  No Known Drug Allergies      PAST MEDICAL & SURGICAL HISTORY:  Hypertension    Diabetes Mellitus, Type 2    Hypercholesterolemia    Parkinson disease    CAD (coronary artery disease)  s/p 1 stent in 2010 and CABG 2019    S/P CABG (coronary artery bypass graft)  2019    S/P hip replacement, right  2016    Anxiety    Depression, major    Pertinent labs:                      9.6    10.03 )-----------( 356      ( 29 Jan 2022 06:37 )             31.0   01-29    132<L>  |  94<L>  |  60<H>  ----------------------------<  103<H>  3.7   |  26  |  3.13<H>    Ca    9.7      29 Jan 2022 06:37      A/P:  -Please obtain L AVF vascular US to assess if there is thrombosis or stenosis.  -Once US obtained, page IR to review and determine plan for patient.  -If patient needs dialysis in the interim, please consult vascular for bedside Shiley placement.

## 2022-01-29 NOTE — PROGRESS NOTE ADULT - SUBJECTIVE AND OBJECTIVE BOX
NEPHROLOGY    Patient seen and examined.    MEDICATIONS  (STANDING):  atorvastatin 80 milliGRAM(s) Oral at bedtime  carbidopa/levodopa  25/100 2.5 Tablet(s) Oral <User Schedule>  carbidopa/levodopa  25/100 2 Tablet(s) Oral <User Schedule>  chlorhexidine 4% Liquid 1 Application(s) Topical <User Schedule>  cinacalcet 60 milliGRAM(s) Oral daily  dextrose 40% Gel 15 Gram(s) Oral once  dextrose 5%. 1000 milliLiter(s) (50 mL/Hr) IV Continuous <Continuous>  dextrose 5%. 1000 milliLiter(s) (100 mL/Hr) IV Continuous <Continuous>  dextrose 50% Injectable 25 Gram(s) IV Push once  dextrose 50% Injectable 12.5 Gram(s) IV Push once  dextrose 50% Injectable 25 Gram(s) IV Push once  diVALproex Sprinkle 250 milliGRAM(s) Oral three times a day  DULoxetine 20 milliGRAM(s) Oral daily  folic acid 1 milliGRAM(s) Oral daily  glucagon  Injectable 1 milliGRAM(s) IntraMuscular once  heparin   Injectable 5000 Unit(s) SubCutaneous every 12 hours  insulin glargine Injectable (LANTUS) 6 Unit(s) SubCutaneous at bedtime  insulin lispro (ADMELOG) corrective regimen sliding scale   SubCutaneous every 6 hours  levothyroxine 25 MICROGram(s) Oral daily  memantine 5 milliGRAM(s) Oral at bedtime  metoprolol tartrate 50 milliGRAM(s) Oral two times a day  midodrine 10 milliGRAM(s) Oral <User Schedule>  mirtazapine 7.5 milliGRAM(s) Oral daily  Nephro-celeste 1 Tablet(s) Oral daily  pantoprazole   Suspension 40 milliGRAM(s) Enteral Tube two times a day  QUEtiapine 25 milliGRAM(s) Oral at bedtime  trihexyphenidyl 2 milliGRAM(s) Oral three times a day    VITALS:  T(C): , Max: 36.9 (01-29-22 @ 11:27)  T(F): , Max: 98.4 (01-29-22 @ 11:27)  HR: 84 (01-29-22 @ 11:27)  BP: 157/80 (01-29-22 @ 11:27)  RR: 18 (01-29-22 @ 11:27)  SpO2: 92% (01-29-22 @ 11:27)    I and O's:    01-28 @ 07:01  -  01-29 @ 07:00  --------------------------------------------------------  IN: 0 mL / OUT: 150 mL / NET: -150 mL    PHYSICAL EXAM:      LABS:                        9.6    10.03 )-----------( 356      ( 29 Jan 2022 06:37 )             31.0     01-29    132<L>  |  94<L>  |  60<H>  ----------------------------<  103<H>  3.7   |  26  |  3.13<H>    Ca    9.7      29 Jan 2022 06:37     NEPHROLOGY    Patient seen and examined. Pt awake, not responding to questions, per RN no acute overnight events, pt appears comfortable, not sob, currently in no acute distress. HD planned for today however, as per HD RN, L AVF malfunctioning.     MEDICATIONS  (STANDING):  atorvastatin 80 milliGRAM(s) Oral at bedtime  carbidopa/levodopa  25/100 2.5 Tablet(s) Oral <User Schedule>  carbidopa/levodopa  25/100 2 Tablet(s) Oral <User Schedule>  chlorhexidine 4% Liquid 1 Application(s) Topical <User Schedule>  cinacalcet 60 milliGRAM(s) Oral daily  dextrose 40% Gel 15 Gram(s) Oral once  dextrose 5%. 1000 milliLiter(s) (50 mL/Hr) IV Continuous <Continuous>  dextrose 5%. 1000 milliLiter(s) (100 mL/Hr) IV Continuous <Continuous>  dextrose 50% Injectable 25 Gram(s) IV Push once  dextrose 50% Injectable 12.5 Gram(s) IV Push once  dextrose 50% Injectable 25 Gram(s) IV Push once  diVALproex Sprinkle 250 milliGRAM(s) Oral three times a day  DULoxetine 20 milliGRAM(s) Oral daily  folic acid 1 milliGRAM(s) Oral daily  glucagon  Injectable 1 milliGRAM(s) IntraMuscular once  heparin   Injectable 5000 Unit(s) SubCutaneous every 12 hours  insulin glargine Injectable (LANTUS) 6 Unit(s) SubCutaneous at bedtime  insulin lispro (ADMELOG) corrective regimen sliding scale   SubCutaneous every 6 hours  levothyroxine 25 MICROGram(s) Oral daily  memantine 5 milliGRAM(s) Oral at bedtime  metoprolol tartrate 50 milliGRAM(s) Oral two times a day  midodrine 10 milliGRAM(s) Oral <User Schedule>  mirtazapine 7.5 milliGRAM(s) Oral daily  Nephro-celeste 1 Tablet(s) Oral daily  pantoprazole   Suspension 40 milliGRAM(s) Enteral Tube two times a day  QUEtiapine 25 milliGRAM(s) Oral at bedtime  trihexyphenidyl 2 milliGRAM(s) Oral three times a day    VITALS:  T(C): , Max: 36.9 (01-29-22 @ 11:27)  T(F): , Max: 98.4 (01-29-22 @ 11:27)  HR: 84 (01-29-22 @ 11:27)  BP: 157/80 (01-29-22 @ 11:27)  RR: 18 (01-29-22 @ 11:27)  SpO2: 92% (01-29-22 @ 11:27)    I and O's:    01-28 @ 07:01  -  01-29 @ 07:00  --------------------------------------------------------  IN: 0 mL / OUT: 150 mL / NET: -150 mL    PHYSICAL EXAM:  Constitutional: NAD  Neck:  No JVD  Respiratory: CTAB/L  Cardiovascular: S1 and S2  Gastrointestinal: BS+, soft, NT/ND, +PEG  Extremities: No peripheral edema  Neurological: A/O x 1-2, no focal deficits  Psychiatric: Normal mood, normal affect  : + Javier  Skin: No rashes  Access: L AVF (+thrill)     LABS:                        9.6    10.03 )-----------( 356      ( 29 Jan 2022 06:37 )             31.0     01-29    132<L>  |  94<L>  |  60<H>  ----------------------------<  103<H>  3.7   |  26  |  3.13<H>    Ca    9.7      29 Jan 2022 06:37

## 2022-01-30 NOTE — PROGRESS NOTE ADULT - ASSESSMENT
71 M w volume overload, GI consulted for drop in hgb    1. CHF per cardio    2. ABBY on CKD per renal  -on HD via permacath per renal, midodrine during dialysis  -s/p L AVF, awaiting maturation     3. RP bleed  s/p Abdominal Angiography and Embolization on 10/29/2021 by Interventional radiology of l4and l5 lumbar artery     4. Failure to thrive  -s/p PEG 1/10    -maintain abdominal binder  -tolerating TF, continue TF  - aspiration precautions    5. stress duodenal ulcers  -PPI BID    6. Constipation  -resolved  -nonobstructive gas pattern on x-ray 1/13  -continue Miralax BID  -DC planning to rehab underway       [facility]  Gastroenterology and Hepatology  [address] [place], [place]  Office: [phone]  Cell: [phone]

## 2022-01-30 NOTE — PROGRESS NOTE ADULT - SUBJECTIVE AND OBJECTIVE BOX
Chief complaint    Patient is a 71y old  Male who presents with a chief complaint of leg swelling (30 Jan 2022 09:37)   Review of systems  Patient in bed, appears comfortable.    Labs and Fingersticks  CAPILLARY BLOOD GLUCOSE      POCT Blood Glucose.: 137 mg/dL (30 Jan 2022 21:05)  POCT Blood Glucose.: 169 mg/dL (30 Jan 2022 16:44)  POCT Blood Glucose.: 154 mg/dL (30 Jan 2022 11:50)  POCT Blood Glucose.: 169 mg/dL (30 Jan 2022 05:27)      Anion Gap, Serum: 15 (01-30 @ 05:59)  Anion Gap, Serum: 12 (01-29 @ 06:37)      Calcium, Total Serum: 9.8 (01-30 @ 05:59)  Calcium, Total Serum: 9.7 (01-29 @ 06:37)          01-30    131<L>  |  93<L>  |  81<H>  ----------------------------<  158<H>  4.3   |  23  |  3.56<H>    Ca    9.8      30 Jan 2022 05:59                          9.6    10.03 )-----------( 356      ( 29 Jan 2022 06:37 )             31.0     Medications  MEDICATIONS  (STANDING):  atorvastatin 80 milliGRAM(s) Oral at bedtime  carbidopa/levodopa  25/100 2.5 Tablet(s) Oral <User Schedule>  carbidopa/levodopa  25/100 2 Tablet(s) Oral <User Schedule>  chlorhexidine 4% Liquid 1 Application(s) Topical <User Schedule>  cinacalcet 60 milliGRAM(s) Oral daily  dextrose 40% Gel 15 Gram(s) Oral once  dextrose 5%. 1000 milliLiter(s) (50 mL/Hr) IV Continuous <Continuous>  dextrose 5%. 1000 milliLiter(s) (100 mL/Hr) IV Continuous <Continuous>  dextrose 50% Injectable 25 Gram(s) IV Push once  dextrose 50% Injectable 12.5 Gram(s) IV Push once  dextrose 50% Injectable 25 Gram(s) IV Push once  diVALproex Sprinkle 250 milliGRAM(s) Oral three times a day  DULoxetine 20 milliGRAM(s) Oral daily  folic acid 1 milliGRAM(s) Oral daily  glucagon  Injectable 1 milliGRAM(s) IntraMuscular once  heparin   Injectable 5000 Unit(s) SubCutaneous every 12 hours  insulin glargine Injectable (LANTUS) 6 Unit(s) SubCutaneous at bedtime  insulin lispro (ADMELOG) corrective regimen sliding scale   SubCutaneous every 6 hours  levothyroxine 25 MICROGram(s) Oral daily  memantine 5 milliGRAM(s) Oral at bedtime  metoprolol tartrate 50 milliGRAM(s) Oral two times a day  midodrine 10 milliGRAM(s) Oral <User Schedule>  mirtazapine 7.5 milliGRAM(s) Oral daily  Nephro-celeste 1 Tablet(s) Oral daily  pantoprazole   Suspension 40 milliGRAM(s) Enteral Tube two times a day  QUEtiapine 25 milliGRAM(s) Oral at bedtime  trihexyphenidyl 2 milliGRAM(s) Oral three times a day      Physical Exam  General: Patient comfortable in bed  Vital Signs Last 12 Hrs  T(F): 97.2 (01-30-22 @ 21:18), Max: 98.1 (01-30-22 @ 10:52)  HR: 66 (01-30-22 @ 21:18) (66 - 76)  BP: 159/77 (01-30-22 @ 21:18) (152/81 - 159/77)  BP(mean): --  RR: 18 (01-30-22 @ 21:18) (18 - 18)  SpO2: 96% (01-30-22 @ 21:18) (96% - 96%)  Neck: No palpable thyroid nodules.  CVS: S1S2, No murmurs  Respiratory: No wheezing, no crepitations  GI: Abdomen soft, bowel sounds positive  Musculoskeletal:  edema lower extremities.     Diagnostics    Free Thyroxine, Serum: AM Sched. Collection: 27-Jan-2022 06:00 (01-26 @ 08:06)  Thyroid Stimulating Hormone, Serum: AM Sched. Collection: 27-Jan-2022 06:00 (01-26 @ 08:06)  Free Thyroxine, Serum: AM Sched. Collection: 19-Jan-2022 06:00 (01-18 @ 15:42)  Cortisol AM, Serum: AM Sched. Collection: 15-Paul-2022 06:00 (01-14 @ 08:01)  Free Thyroxine, Serum: AM Sched. Collection: 15-Paul-2022 06:00 (01-14 @ 08:01)  Thyroid Stimulating Hormone, Serum: AM Sched. Collection: 15-Paul-2022 06:00 (01-14 @ 08:01)

## 2022-01-30 NOTE — PROGRESS NOTE ADULT - ASSESSMENT
Assessment  DMT2: 71y Male with DM T2 with hyperglycemia, A1C 6.4%, was on insulin at home, now on basal insulin and coverage, blood sugars are improving,  remains on tube feeds.  Hypothyroidism: Patient has no history thyroid disease, was not on any thyroid supplements, subclinical with low-normal FT4, lethargic, on synthroid 25 mcg po daily, repeat TFTs improved and euthyroid.  Hypercalcemia: Started on Sensipar 60mg daily, Ca improving.  CHF: on medications, stable, monitored.  HTN: Controlled,  on antihypertensive medications.  Parkinsons: on meds, monitored.  CKD: Monitor labs/BMP      Dr Ayers  Cell : [phone]

## 2022-01-31 NOTE — PROGRESS NOTE ADULT - SUBJECTIVE AND OBJECTIVE BOX
Chief Complaint:  Patient is a 71y old  Male who presents with a chief complaint of leg swelling (30 Jan 2022 21:44)      Date of service 01-31-22 @ 13:00      Interval Events:   Patient seen and examined. No acute overnight events.     Hospital Medications:  acetaminophen     Tablet .. 650 milliGRAM(s) Oral every 6 hours PRN  albuterol/ipratropium for Nebulization 3 milliLiter(s) Nebulizer every 6 hours PRN  atorvastatin 80 milliGRAM(s) Oral at bedtime  carbidopa/levodopa  25/100 2.5 Tablet(s) Oral <User Schedule>  carbidopa/levodopa  25/100 2 Tablet(s) Oral <User Schedule>  chlorhexidine 4% Liquid 1 Application(s) Topical <User Schedule>  cinacalcet 60 milliGRAM(s) Oral daily  dextrose 40% Gel 15 Gram(s) Oral once  dextrose 5%. 1000 milliLiter(s) IV Continuous <Continuous>  dextrose 5%. 1000 milliLiter(s) IV Continuous <Continuous>  dextrose 50% Injectable 25 Gram(s) IV Push once  dextrose 50% Injectable 12.5 Gram(s) IV Push once  dextrose 50% Injectable 25 Gram(s) IV Push once  diVALproex Sprinkle 125 milliGRAM(s) Oral every 8 hours PRN  diVALproex Sprinkle 250 milliGRAM(s) Oral three times a day  DULoxetine 20 milliGRAM(s) Oral daily  folic acid 1 milliGRAM(s) Oral daily  glucagon  Injectable 1 milliGRAM(s) IntraMuscular once  guaiFENesin Oral Liquid (Sugar-Free) 200 milliGRAM(s) Oral every 6 hours PRN  heparin   Injectable 5000 Unit(s) SubCutaneous every 12 hours  insulin glargine Injectable (LANTUS) 6 Unit(s) SubCutaneous at bedtime  insulin lispro (ADMELOG) corrective regimen sliding scale   SubCutaneous every 6 hours  levothyroxine 25 MICROGram(s) Oral daily  memantine 5 milliGRAM(s) Oral at bedtime  metoprolol tartrate 50 milliGRAM(s) Oral two times a day  midodrine 10 milliGRAM(s) Oral <User Schedule>  mirtazapine 7.5 milliGRAM(s) Oral daily  Nephro-celeste 1 Tablet(s) Oral daily  ondansetron Injectable 4 milliGRAM(s) IV Push once PRN  pantoprazole   Suspension 40 milliGRAM(s) Enteral Tube two times a day  QUEtiapine 12.5 milliGRAM(s) Oral every 6 hours PRN  QUEtiapine 25 milliGRAM(s) Oral at bedtime  sodium chloride 0.9% lock flush 10 milliLiter(s) IV Push every 1 hour PRN  trihexyphenidyl 2 milliGRAM(s) Oral three times a day        Review of Systems:  unable to obtain    PHYSICAL EXAM:   Vital Signs:  Vital Signs Last 24 Hrs  T(C): 36.3 (31 Jan 2022 10:37), Max: 36.4 (31 Jan 2022 04:52)  T(F): 97.3 (31 Jan 2022 10:37), Max: 97.6 (31 Jan 2022 04:52)  HR: 91 (31 Jan 2022 10:37) (61 - 91)  BP: 157/95 (31 Jan 2022 10:37) (114/79 - 159/77)  BP(mean): --  RR: 18 (31 Jan 2022 10:37) (18 - 18)  SpO2: 95% (31 Jan 2022 10:37) (95% - 96%)  Daily     Daily       PHYSICAL EXAM:     GENERAL:  Appears stated age, well-groomed, well-nourished, no distress  HEENT:  NC/AT,  conjunctivae anicteric, clear and pink,   NECK: supple, trachea midline  CHEST:  Full & symmetric excursion, no increased effort, breath sounds clear  HEART:  Regular rhythm, no JVD  ABDOMEN:  Soft, non-tender, non-distended, normoactive bowel sounds,  no masses , no hepatosplenomegaly, +gastrostomy  EXTREMITIES:  no cyanosis,clubbing or edema  SKIN:  No rash, erythema, or, ecchymoses, no jaundice  NEURO:  non-focal, no asterixis  RECTAL: Deferred      LABS Personally reviewed by me:      01-30    131<L>  |  93<L>  |  81<H>  ----------------------------<  158<H>  4.3   |  23  |  3.56<H>    Ca    9.8      30 Jan 2022 05:59                                    9.6    10.03 )-----------( 356      ( 29 Jan 2022 06:37 )             31.0       Imaging personally reviewed by me:

## 2022-01-31 NOTE — PROGRESS NOTE ADULT - ASSESSMENT
72yo M w/ PMHx of CAD (s/p CABG 2019), CKD (unknown stage), DM2, Parkinson's Disease, HTN, depression presents with new onset acute heart failure exacerbation, NSTEMI, started on hep gtt, developed bl RP bleed, acute anemia. sp MICU course for hemorrhagic shock, 10/28 sp IR embolization l4 and l5 lumbar artery. sp permacath and AVF.    # chronic systolic and diastolic heart failure  # ESRD, new HD  # Atrial flutter  # Parkinson's disease   # delirium  # CAD (coronary artery disease)  # HTN  # DM2 (diabetes mellitus, type 2)  # FTT sp PEG   midodrine during dialysis  sp L AVF using for HD, permacath dced  #1/29 malfunctioning Lt AVF- IR eval noted, vascular eval -for fistulogram 1/31  plan for HD post fistulogram today    holding Seroquel for lethargy,, CT head no acute finding, known meningioma  psych fu noted  fu recs  ID following, no indication for abx  namenda, sinemet  cymbalta, remeron  trihexyphenidyl  insulin per endo  lopressor, atorvastatin  tube feeds, abd binder    DVT ppx hsq    DNR/DNI    dc planning  awaiting rehab placement pending AVF mgmt    [facility]  [phone]

## 2022-01-31 NOTE — PROGRESS NOTE ADULT - SUBJECTIVE AND OBJECTIVE BOX
Patient is a 71y old  Male who presents with a chief complaint of leg swelling (31 Jan 2022 12:59)      INTERVAL HPI/OVERNIGHT EVENTS: noted  pt seen and examined this am   events noted  sleepy  per staff was alert earlier had good breakfast      Vital Signs Last 24 Hrs  T(C): 36.3 (31 Jan 2022 10:37), Max: 36.4 (31 Jan 2022 04:52)  T(F): 97.3 (31 Jan 2022 10:37), Max: 97.6 (31 Jan 2022 04:52)  HR: 91 (31 Jan 2022 10:37) (61 - 91)  BP: 157/95 (31 Jan 2022 10:37) (114/79 - 159/77)  BP(mean): --  RR: 18 (31 Jan 2022 10:37) (18 - 18)  SpO2: 95% (31 Jan 2022 10:37) (95% - 96%)    acetaminophen     Tablet .. 650 milliGRAM(s) Oral every 6 hours PRN  albuterol/ipratropium for Nebulization 3 milliLiter(s) Nebulizer every 6 hours PRN  atorvastatin 80 milliGRAM(s) Oral at bedtime  carbidopa/levodopa  25/100 2.5 Tablet(s) Oral <User Schedule>  carbidopa/levodopa  25/100 2 Tablet(s) Oral <User Schedule>  chlorhexidine 4% Liquid 1 Application(s) Topical <User Schedule>  cinacalcet 60 milliGRAM(s) Oral daily  dextrose 40% Gel 15 Gram(s) Oral once  dextrose 5%. 1000 milliLiter(s) IV Continuous <Continuous>  dextrose 5%. 1000 milliLiter(s) IV Continuous <Continuous>  dextrose 50% Injectable 25 Gram(s) IV Push once  dextrose 50% Injectable 12.5 Gram(s) IV Push once  dextrose 50% Injectable 25 Gram(s) IV Push once  diVALproex Sprinkle 125 milliGRAM(s) Oral every 8 hours PRN  diVALproex Sprinkle 250 milliGRAM(s) Oral three times a day  DULoxetine 20 milliGRAM(s) Oral daily  folic acid 1 milliGRAM(s) Oral daily  glucagon  Injectable 1 milliGRAM(s) IntraMuscular once  guaiFENesin Oral Liquid (Sugar-Free) 200 milliGRAM(s) Oral every 6 hours PRN  heparin   Injectable 5000 Unit(s) SubCutaneous every 12 hours  insulin glargine Injectable (LANTUS) 6 Unit(s) SubCutaneous at bedtime  insulin lispro (ADMELOG) corrective regimen sliding scale   SubCutaneous every 6 hours  levothyroxine 25 MICROGram(s) Oral daily  memantine 5 milliGRAM(s) Oral at bedtime  metoprolol tartrate 50 milliGRAM(s) Oral two times a day  midodrine 10 milliGRAM(s) Oral <User Schedule>  mirtazapine 7.5 milliGRAM(s) Oral daily  Nephro-celeste 1 Tablet(s) Oral daily  ondansetron Injectable 4 milliGRAM(s) IV Push once PRN  pantoprazole   Suspension 40 milliGRAM(s) Enteral Tube two times a day  QUEtiapine 12.5 milliGRAM(s) Oral every 6 hours PRN  QUEtiapine 25 milliGRAM(s) Oral at bedtime  sodium chloride 0.9% lock flush 10 milliLiter(s) IV Push every 1 hour PRN  trihexyphenidyl 2 milliGRAM(s) Oral three times a day      PHYSICAL EXAM:  GENERAL: NAD,   EYES: conjunctiva and sclera clear  ENMT: Moist mucous membranes  NECK: Supple, No JVD, Normal thyroid  CHEST/LUNG: non labored, cta b/l  HEART: Regular rate and rhythm; No murmurs, rubs, or gallops  ABDOMEN: Soft, Nontender, Nondistended; Bowel sounds present  EXTREMITIES:  2+ Peripheral Pulses, No clubbing, cyanosis, or edema  LYMPH: No lymphadenopathy noted  SKIN: No rashes or lesions    Consultant(s) Notes Reviewed:  [x ] YES  [ ] NO  Care Discussed with Consultants/Other Providers [ x] YES  [ ] NO    LABS:    01-30    131<L>  |  93<L>  |  81<H>  ----------------------------<  158<H>  4.3   |  23  |  3.56<H>    Ca    9.8      30 Jan 2022 05:59          CAPILLARY BLOOD GLUCOSE      POCT Blood Glucose.: 116 mg/dL (31 Jan 2022 11:44)  POCT Blood Glucose.: 121 mg/dL (31 Jan 2022 07:26)  POCT Blood Glucose.: 137 mg/dL (30 Jan 2022 21:05)  POCT Blood Glucose.: 169 mg/dL (30 Jan 2022 16:44)              RADIOLOGY & ADDITIONAL TESTS:    Imaging Personally Reviewed:  [x ] YES  [ ] NO

## 2022-01-31 NOTE — PROGRESS NOTE ADULT - ASSESSMENT
Assessment  DMT2: 71y Male with DM T2 with hyperglycemia, A1C 6.4%, was on insulin at home, now on basal insulin and coverage, blood sugars are trending within acceptable range, no hypoglycemias, on tube feeds, DC planning for MILDRED.  Hypothyroidism: Patient has no history thyroid disease, was not on any thyroid supplements, subclinical with low-normal FT4, lethargic, on synthroid 25 mcg po daily, repeat TFTs improved and euthyroid.  Hypercalcemia: Started on Sensipar 60mg daily, Ca improving.  CHF: on medications, stable, monitored.  HTN: Controlled,  on antihypertensive medications.  Parkinsons: on meds, monitored.  CKD: Monitor labs/BMP      Dr Ayers  Cell : [phone]   Assessment  DMT2: 71y Male with DM T2 with hyperglycemia, A1C 6.4%, was on insulin at home, now on basal insulin and coverage,  blood sugars are trending within acceptable range, no hypoglycemias, on tube feeds, DC planning for MILDRED.  Hypothyroidism: Patient has no history thyroid disease, was not on any thyroid supplements, subclinical with low-normal FT4, lethargic, on synthroid 25 mcg po daily, repeat TFTs improved and euthyroid.  Hypercalcemia: Started on Sensipar 60mg daily, Ca improving.  CHF: on medications, stable, monitored.  HTN: Controlled,  on antihypertensive medications.  Parkinsons: on meds, monitored.  CKD: Monitor labs/BMP      Dr Ayers  Cell : [phone]

## 2022-01-31 NOTE — PROGRESS NOTE ADULT - SUBJECTIVE AND OBJECTIVE BOX
Chief complaint  Patient is a 71y old  Male who presents with a chief complaint of leg swelling (31 Jan 2022 14:43)   Review of systems  Patient in bed, looks comfortable, no hypoglycemic episodes.    Labs and Fingersticks  CAPILLARY BLOOD GLUCOSE      POCT Blood Glucose.: 116 mg/dL (31 Jan 2022 11:44)  POCT Blood Glucose.: 121 mg/dL (31 Jan 2022 07:26)  POCT Blood Glucose.: 137 mg/dL (30 Jan 2022 21:05)  POCT Blood Glucose.: 169 mg/dL (30 Jan 2022 16:44)      Anion Gap, Serum: 15 (01-30 @ 05:59)      Calcium, Total Serum: 9.8 (01-30 @ 05:59)          01-30    131<L>  |  93<L>  |  81<H>  ----------------------------<  158<H>  4.3   |  23  |  3.56<H>    Ca    9.8      30 Jan 2022 05:59      Medications  MEDICATIONS  (STANDING):  atorvastatin 80 milliGRAM(s) Oral at bedtime  carbidopa/levodopa  25/100 2.5 Tablet(s) Oral <User Schedule>  carbidopa/levodopa  25/100 2 Tablet(s) Oral <User Schedule>  chlorhexidine 4% Liquid 1 Application(s) Topical <User Schedule>  cinacalcet 60 milliGRAM(s) Oral daily  dextrose 40% Gel 15 Gram(s) Oral once  dextrose 5%. 1000 milliLiter(s) (50 mL/Hr) IV Continuous <Continuous>  dextrose 5%. 1000 milliLiter(s) (100 mL/Hr) IV Continuous <Continuous>  dextrose 50% Injectable 25 Gram(s) IV Push once  dextrose 50% Injectable 12.5 Gram(s) IV Push once  dextrose 50% Injectable 25 Gram(s) IV Push once  diVALproex Sprinkle 250 milliGRAM(s) Oral three times a day  DULoxetine 20 milliGRAM(s) Oral daily  folic acid 1 milliGRAM(s) Oral daily  glucagon  Injectable 1 milliGRAM(s) IntraMuscular once  heparin   Injectable 5000 Unit(s) SubCutaneous every 12 hours  insulin glargine Injectable (LANTUS) 6 Unit(s) SubCutaneous at bedtime  insulin lispro (ADMELOG) corrective regimen sliding scale   SubCutaneous every 6 hours  levothyroxine 25 MICROGram(s) Oral daily  memantine 5 milliGRAM(s) Oral at bedtime  metoprolol tartrate 50 milliGRAM(s) Oral two times a day  midodrine 10 milliGRAM(s) Oral <User Schedule>  mirtazapine 7.5 milliGRAM(s) Oral daily  Nephro-celeste 1 Tablet(s) Oral daily  pantoprazole   Suspension 40 milliGRAM(s) Enteral Tube two times a day  QUEtiapine 25 milliGRAM(s) Oral at bedtime  trihexyphenidyl 2 milliGRAM(s) Oral three times a day      Physical Exam  General: Patient comfortable in bed  Vital Signs Last 12 Hrs  T(F): 97.3 (01-31-22 @ 10:37), Max: 97.6 (01-31-22 @ 04:52)  HR: 91 (01-31-22 @ 10:37) (61 - 91)  BP: 157/95 (01-31-22 @ 10:37) (114/79 - 157/95)  BP(mean): --  RR: 18 (01-31-22 @ 10:37) (18 - 18)  SpO2: 95% (01-31-22 @ 10:37) (95% - 96%)  Neck: No palpable thyroid nodules.  CVS: S1S2, No murmurs  Respiratory: No wheezing, no crepitations  GI: Abdomen soft, bowel sounds positive  Musculoskeletal:  edema lower extremities.   Skin: No skin rashes, no ecchymosis    Diagnostics             Chief complaint  Patient is a 71y old  Male who presents with a chief complaint of leg swelling (31 Jan 2022 14:43)   Review of systems  Patient in bed, looks comfortable, no hypoglycemic episodes.    Labs and Fingersticks  CAPILLARY BLOOD GLUCOSE      POCT Blood Glucose.: 116 mg/dL (31 Jan 2022 11:44)  POCT Blood Glucose.: 121 mg/dL (31 Jan 2022 07:26)  POCT Blood Glucose.: 137 mg/dL (30 Jan 2022 21:05)  POCT Blood Glucose.: 169 mg/dL (30 Jan 2022 16:44)      Anion Gap, Serum: 15 (01-30 @ 05:59)      Calcium, Total Serum: 9.8 (01-30 @ 05:59)          01-30    131<L>  |  93<L>  |  81<H>  ----------------------------<  158<H>  4.3   |  23  |  3.56<H>    Ca    9.8      30 Jan 2022 05:59      Medications  MEDICATIONS  (STANDING):  atorvastatin 80 milliGRAM(s) Oral at bedtime  carbidopa/levodopa  25/100 2.5 Tablet(s) Oral <User Schedule>  carbidopa/levodopa  25/100 2 Tablet(s) Oral <User Schedule>  chlorhexidine 4% Liquid 1 Application(s) Topical <User Schedule>  cinacalcet 60 milliGRAM(s) Oral daily  dextrose 40% Gel 15 Gram(s) Oral once  dextrose 5%. 1000 milliLiter(s) (50 mL/Hr) IV Continuous <Continuous>  dextrose 5%. 1000 milliLiter(s) (100 mL/Hr) IV Continuous <Continuous>  dextrose 50% Injectable 25 Gram(s) IV Push once  dextrose 50% Injectable 12.5 Gram(s) IV Push once  dextrose 50% Injectable 25 Gram(s) IV Push once  diVALproex Sprinkle 250 milliGRAM(s) Oral three times a day  DULoxetine 20 milliGRAM(s) Oral daily  folic acid 1 milliGRAM(s) Oral daily  glucagon  Injectable 1 milliGRAM(s) IntraMuscular once  heparin   Injectable 5000 Unit(s) SubCutaneous every 12 hours  insulin glargine Injectable (LANTUS) 6 Unit(s) SubCutaneous at bedtime  insulin lispro (ADMELOG) corrective regimen sliding scale   SubCutaneous every 6 hours  levothyroxine 25 MICROGram(s) Oral daily  memantine 5 milliGRAM(s) Oral at bedtime  metoprolol tartrate 50 milliGRAM(s) Oral two times a day  midodrine 10 milliGRAM(s) Oral <User Schedule>  mirtazapine 7.5 milliGRAM(s) Oral daily  Nephro-celeste 1 Tablet(s) Oral daily  pantoprazole   Suspension 40 milliGRAM(s) Enteral Tube two times a day  QUEtiapine 25 milliGRAM(s) Oral at bedtime  trihexyphenidyl 2 milliGRAM(s) Oral three times a day      Physical Exam  General: Patient comfortable in bed  Vital Signs Last 12 Hrs  T(F): 97.3 (01-31-22 @ 10:37), Max: 97.6 (01-31-22 @ 04:52)  HR: 91 (01-31-22 @ 10:37) (61 - 91)  BP: 157/95 (01-31-22 @ 10:37) (114/79 - 157/95)  BP(mean): --  RR: 18 (01-31-22 @ 10:37) (18 - 18)  SpO2: 95% (01-31-22 @ 10:37) (95% - 96%)  Neck: No palpable thyroid nodules.  CVS: S1S2, No murmurs  Respiratory: No wheezing, no crepitations  GI: Abdomen soft, bowel sounds positive  Musculoskeletal:  edema lower extremities.   Skin: No skin rashes, no ecchymosis    Diagnostics

## 2022-01-31 NOTE — PROGRESS NOTE ADULT - ASSESSMENT
71 M w volume overload, GI consulted for drop in hgb    1. CHF per cardio    2. ABBY on CKD per renal  -on HD via permacath per renal, midodrine during dialysis  -s/p L AVF, awaiting maturation     3. RP bleed  s/p Abdominal Angiography and Embolization on 10/29/2021 by Interventional radiology of l4and l5 lumbar artery     4. Failure to thrive  -s/p PEG 1/10    -maintain abdominal binder  -tolerating TF, continue TF  - aspiration precautions    5. stress duodenal ulcers  -PPI BID    6. Constipation  -resolved  -nonobstructive gas pattern on x-ray 1/13  -continue Miralax BID  -DC planning to rehab underway       [facility]  Gastroenterology and Hepatology  [address] [place], [place]  Office: [phone]  Cell: [phone]

## 2022-01-31 NOTE — PROGRESS NOTE ADULT - SUBJECTIVE AND OBJECTIVE BOX
[facility] [phone]        Patient seen and examined in bed.  He was the same         MEDICATIONS  (STANDING):  atorvastatin 80 milliGRAM(s) Oral at bedtime  carbidopa/levodopa  25/100 2.5 Tablet(s) Oral <User Schedule>  carbidopa/levodopa  25/100 2 Tablet(s) Oral <User Schedule>  chlorhexidine 4% Liquid 1 Application(s) Topical <User Schedule>  cinacalcet 60 milliGRAM(s) Oral daily  dextrose 40% Gel 15 Gram(s) Oral once  dextrose 5%. 1000 milliLiter(s) (50 mL/Hr) IV Continuous <Continuous>  dextrose 5%. 1000 milliLiter(s) (100 mL/Hr) IV Continuous <Continuous>  dextrose 50% Injectable 25 Gram(s) IV Push once  dextrose 50% Injectable 12.5 Gram(s) IV Push once  dextrose 50% Injectable 25 Gram(s) IV Push once  diVALproex Sprinkle 250 milliGRAM(s) Oral three times a day  DULoxetine 20 milliGRAM(s) Oral daily  folic acid 1 milliGRAM(s) Oral daily  glucagon  Injectable 1 milliGRAM(s) IntraMuscular once  heparin   Injectable 5000 Unit(s) SubCutaneous every 12 hours  insulin glargine Injectable (LANTUS) 6 Unit(s) SubCutaneous at bedtime  insulin lispro (ADMELOG) corrective regimen sliding scale   SubCutaneous every 6 hours  levothyroxine 25 MICROGram(s) Oral daily  memantine 5 milliGRAM(s) Oral at bedtime  metoprolol tartrate 50 milliGRAM(s) Oral two times a day  midodrine 10 milliGRAM(s) Oral <User Schedule>  mirtazapine 7.5 milliGRAM(s) Oral daily  Nephro-celeste 1 Tablet(s) Oral daily  pantoprazole   Suspension 40 milliGRAM(s) Enteral Tube two times a day  QUEtiapine 25 milliGRAM(s) Oral at bedtime  trihexyphenidyl 2 milliGRAM(s) Oral three times a day      VITAL:  T(C): , Max: 36.4 (01-31-22 @ 04:52)  T(F): , Max: 97.6 (01-31-22 @ 04:52)  HR: 91 (01-31-22 @ 10:37)  BP: 157/95 (01-31-22 @ 10:37)  BP(mean): --  RR: 18 (01-31-22 @ 10:37)  SpO2: 95% (01-31-22 @ 10:37)  Wt(kg): --    I and O's:    01-30 @ 07:01  -  01-31 @ 07:00  --------------------------------------------------------  IN: 0 mL / OUT: 600 mL / NET: -600 mL    01-31 @ 07:01 - 01-31 @ 14:43  --------------------------------------------------------  IN: 0 mL / OUT: 200 mL / NET: -200 mL          PHYSICAL EXAM:    Constitutional: NAD  Neck:  No JVD  Respiratory: CTAB/L  Cardiovascular: S1 and S2  Gastrointestinal: BS+, soft, NT/ND  Extremities: No peripheral edema  Neurological:  no focal deficits  Psychiatric:    : No Javier  Skin: No rashes  Access:  [facility]    LABS:    01-30    131<L>  |  93<L>  |  81<H>  ----------------------------<  158<H>  4.3   |  23  |  3.56<H>    Ca    9.8      30 Jan 2022 05:59            Urine Studies:          RADIOLOGY & ADDITIONAL STUDIES:

## 2022-01-31 NOTE — PROGRESS NOTE ADULT - ASSESSMENT
71M w/ HTN, DM2, CAD-CABG, Parkinson's disease, and advanced CKD, 10/21/21 p/w LE swelling, c/b COVID; now newly ESRD-HD as of this admission    (1)Renal - ESRD-HD TTS - last dialyzed Thursday, unable to have HD sat   (2)Hyponatremia - higher Na+ bath with dialysis    (3)Hypercalcemia - lower calcium bath with dialysis     RECOMMEND  (1)Fistulogram ordered and will see if can be for today pr in am   (2)Nutritional support       DW ACP       [facility]  [phone]

## 2022-02-01 NOTE — PROGRESS NOTE ADULT - SUBJECTIVE AND OBJECTIVE BOX
Chief Complaint:  Patient is a 71y old  Male who presents with a chief complaint of leg swelling (2022 11:37)      Date of service 22 @ 11:51      Interval Events:   Patient seen and examined. No acute overnight events.    Hospital Medications:  acetaminophen     Tablet .. 650 milliGRAM(s) Oral every 6 hours PRN  albuterol/ipratropium for Nebulization 3 milliLiter(s) Nebulizer every 6 hours PRN  atorvastatin 80 milliGRAM(s) Oral at bedtime  carbidopa/levodopa  25/100 2.5 Tablet(s) Oral <User Schedule>  carbidopa/levodopa  25/100 2 Tablet(s) Oral <User Schedule>  chlorhexidine 4% Liquid 1 Application(s) Topical <User Schedule>  cinacalcet 60 milliGRAM(s) Oral daily  dextrose 40% Gel 15 Gram(s) Oral once  dextrose 5%. 1000 milliLiter(s) IV Continuous <Continuous>  dextrose 5%. 1000 milliLiter(s) IV Continuous <Continuous>  dextrose 50% Injectable 25 Gram(s) IV Push once  dextrose 50% Injectable 12.5 Gram(s) IV Push once  dextrose 50% Injectable 25 Gram(s) IV Push once  diVALproex Sprinkle 125 milliGRAM(s) Oral every 8 hours PRN  diVALproex Sprinkle 250 milliGRAM(s) Oral three times a day  DULoxetine 20 milliGRAM(s) Oral daily  folic acid 1 milliGRAM(s) Oral daily  glucagon  Injectable 1 milliGRAM(s) IntraMuscular once  guaiFENesin Oral Liquid (Sugar-Free) 200 milliGRAM(s) Oral every 6 hours PRN  heparin   Injectable 5000 Unit(s) SubCutaneous every 12 hours  insulin glargine Injectable (LANTUS) 6 Unit(s) SubCutaneous at bedtime  insulin lispro (ADMELOG) corrective regimen sliding scale   SubCutaneous every 6 hours  levothyroxine 25 MICROGram(s) Oral daily  memantine 5 milliGRAM(s) Oral at bedtime  metoprolol tartrate 50 milliGRAM(s) Oral two times a day  midodrine 10 milliGRAM(s) Oral <User Schedule>  mirtazapine 7.5 milliGRAM(s) Oral daily  Nephro-celeste 1 Tablet(s) Oral daily  ondansetron Injectable 4 milliGRAM(s) IV Push once PRN  pantoprazole   Suspension 40 milliGRAM(s) Enteral Tube two times a day  QUEtiapine 12.5 milliGRAM(s) Oral every 6 hours PRN  QUEtiapine 25 milliGRAM(s) Oral at bedtime  sodium chloride 0.9% lock flush 10 milliLiter(s) IV Push every 1 hour PRN  trihexyphenidyl 2 milliGRAM(s) Oral three times a day        Review of Systems:  unable to obtain    PHYSICAL EXAM:   Vital Signs:  Vital Signs Last 24 Hrs  T(C): 36.4 (2022 04:50), Max: 36.4 (2022 04:50)  T(F): 97.5 (2022 04:50), Max: 97.5 (2022 04:50)  HR: 93 (2022 11:10) (92 - 99)  BP: 133/86 (2022 11:10) (131/81 - 155/88)  BP(mean): --  RR: 18 (2022 11:10) (18 - 18)  SpO2: 93% (2022 11:10) (93% - 98%)  Daily     Daily Weight in k.4 (2022 08:07)      PHYSICAL EXAM:     GENERAL:  Appears stated age, well-groomed, well-nourished, no distress  HEENT:  NC/AT,  conjunctivae anicteric, clear and pink,   NECK: supple, trachea midline  CHEST:  Full & symmetric excursion, no increased effort, breath sounds clear  HEART:  Regular rhythm, no JVD  ABDOMEN:  Soft, non-tender, non-distended, normoactive bowel sounds,  no masses , no hepatosplenomegaly, +gastrostomy   EXTREMITIES:  no cyanosis,clubbing or edema  SKIN:  No rash, erythema, or, ecchymoses, no jaundice  NEURO:  non-focal, no asterixis  RECTAL: Deferred      LABS Personally reviewed by me:                        Imaging personally reviewed by me:

## 2022-02-01 NOTE — PROGRESS NOTE ADULT - SUBJECTIVE AND OBJECTIVE BOX
Patient is a 71y old  Male who presents with a chief complaint of leg swelling (01 Feb 2022 14:49)      INTERVAL HPI/OVERNIGHT EVENTS: noted  pt seen and examined this am   events noted        Vital Signs Last 24 Hrs  T(C): 36.4 (01 Feb 2022 04:50), Max: 36.4 (01 Feb 2022 04:50)  T(F): 97.5 (01 Feb 2022 04:50), Max: 97.5 (01 Feb 2022 04:50)  HR: 124 (01 Feb 2022 12:29) (92 - 124)  BP: 133/83 (01 Feb 2022 12:29) (131/81 - 155/88)  BP(mean): --  RR: 18 (01 Feb 2022 12:29) (18 - 18)  SpO2: 93% (01 Feb 2022 12:29) (93% - 98%)    acetaminophen     Tablet .. 650 milliGRAM(s) Oral every 6 hours PRN  albuterol/ipratropium for Nebulization 3 milliLiter(s) Nebulizer every 6 hours PRN  atorvastatin 80 milliGRAM(s) Oral at bedtime  carbidopa/levodopa  25/100 2.5 Tablet(s) Oral <User Schedule>  carbidopa/levodopa  25/100 2 Tablet(s) Oral <User Schedule>  chlorhexidine 4% Liquid 1 Application(s) Topical <User Schedule>  cinacalcet 60 milliGRAM(s) Oral daily  dextrose 40% Gel 15 Gram(s) Oral once  dextrose 5%. 1000 milliLiter(s) IV Continuous <Continuous>  dextrose 5%. 1000 milliLiter(s) IV Continuous <Continuous>  dextrose 50% Injectable 25 Gram(s) IV Push once  dextrose 50% Injectable 12.5 Gram(s) IV Push once  dextrose 50% Injectable 25 Gram(s) IV Push once  diVALproex Sprinkle 125 milliGRAM(s) Oral every 8 hours PRN  diVALproex Sprinkle 250 milliGRAM(s) Oral three times a day  DULoxetine 20 milliGRAM(s) Oral daily  folic acid 1 milliGRAM(s) Oral daily  glucagon  Injectable 1 milliGRAM(s) IntraMuscular once  guaiFENesin Oral Liquid (Sugar-Free) 200 milliGRAM(s) Oral every 6 hours PRN  heparin   Injectable 5000 Unit(s) SubCutaneous every 12 hours  insulin glargine Injectable (LANTUS) 6 Unit(s) SubCutaneous at bedtime  insulin lispro (ADMELOG) corrective regimen sliding scale   SubCutaneous every 6 hours  levothyroxine 25 MICROGram(s) Oral daily  memantine 5 milliGRAM(s) Oral at bedtime  metoprolol tartrate 50 milliGRAM(s) Oral two times a day  midodrine 10 milliGRAM(s) Oral <User Schedule>  mirtazapine 7.5 milliGRAM(s) Oral daily  Nephro-celeste 1 Tablet(s) Oral daily  ondansetron Injectable 4 milliGRAM(s) IV Push once PRN  pantoprazole   Suspension 40 milliGRAM(s) Enteral Tube two times a day  QUEtiapine 12.5 milliGRAM(s) Oral every 6 hours PRN  QUEtiapine 25 milliGRAM(s) Oral at bedtime  sodium chloride 0.9% lock flush 10 milliLiter(s) IV Push every 1 hour PRN  tamsulosin 0.4 milliGRAM(s) Oral at bedtime  trihexyphenidyl 2 milliGRAM(s) Oral three times a day      PHYSICAL EXAM:  GENERAL: NAD,   EYES: conjunctiva and sclera clear  ENMT: Moist mucous membranes  NECK: Supple, No JVD, Normal thyroid  CHEST/LUNG: non labored, cta b/l  HEART: Regular rate and rhythm; No murmurs, rubs, or gallops  ABDOMEN: Soft, Nontender, Nondistended; Bowel sounds present  EXTREMITIES:  2+ Peripheral Pulses, No clubbing, cyanosis, or edema  LYMPH: No lymphadenopathy noted  SKIN: No rashes or lesions    Consultant(s) Notes Reviewed:  [x ] YES  [ ] NO  Care Discussed with Consultants/Other Providers [ x] YES  [ ] NO    LABS:              CAPILLARY BLOOD GLUCOSE      POCT Blood Glucose.: 102 mg/dL (01 Feb 2022 13:07)  POCT Blood Glucose.: 117 mg/dL (01 Feb 2022 07:54)  POCT Blood Glucose.: 109 mg/dL (01 Feb 2022 05:38)  POCT Blood Glucose.: 118 mg/dL (31 Jan 2022 21:22)  POCT Blood Glucose.: 116 mg/dL (31 Jan 2022 16:40)              RADIOLOGY & ADDITIONAL TESTS:    Imaging Personally Reviewed:  [x ] YES  [ ] NO

## 2022-02-01 NOTE — PROGRESS NOTE ADULT - ASSESSMENT
Assessment  DMT2: 71y Male with DM T2 with hyperglycemia, A1C 6.4%, was on insulin at home, now on basal insulin and coverage, blood sugars are trending within acceptable range, no hypoglycemias, on tube feeds, no acute events, DC planning for MILDRED.  Hypothyroidism: Patient has no history thyroid disease, was not on any thyroid supplements, subclinical with low-normal FT4, lethargic, on synthroid 25 mcg po daily, repeat TFTs improved and euthyroid.  Hypercalcemia: Started on Sensipar 60mg daily, Ca improving.  CHF: on medications, stable, monitored.  HTN: Controlled,  on antihypertensive medications.  Parkinsons: on meds, monitored.  CKD: Monitor labs/BMP      Dr Ayers  Cell : [phone]   Assessment  DMT2: 71y Male with DM T2 with hyperglycemia, A1C 6.4%, was on insulin at home, now on basal insulin and coverage, blood sugars are trending within acceptable range, no hypoglycemias, on tube feeds,  no acute events, DC planning for MILDRED.  Hypothyroidism: Patient has no history thyroid disease, was not on any thyroid supplements, subclinical with low-normal FT4, lethargic, on synthroid 25 mcg po daily, repeat TFTs improved and euthyroid.  Hypercalcemia: Started on Sensipar 60mg daily, Ca improving.  CHF: on medications, stable, monitored.  HTN: Controlled,  on antihypertensive medications.  Parkinsons: on meds, monitored.  CKD: Monitor labs/BMP      Dr Ayers  Cell : [phone]

## 2022-02-01 NOTE — PROGRESS NOTE ADULT - SUBJECTIVE AND OBJECTIVE BOX
Interventional Radiology pre-op note    HPI:   71y Male with ESRD and malfunctioning LUE AVF.      Procedure: Plan for fistulogram 2/2/2022          Assessment & Plan:   71y Male with ESRD and malfunctioning LUE AVF.       - Plan for fistulogram 2/1/2022 or 2/2/2022 pending patient's last meal  - Please keep patient NPO after midnight  - hold all anticoagulation x antiplatelet meds  - STAT labs in the am  - Maintain active T&S x2  - COVID PCR within 5 days of procedure  - Informed consent obtained via verbal phone call with son Yunier Quintero       Please contact IR with any questions/ concerns regarding above plan at [phone].   Also available on teams.      Interventional Radiology pre-op note    HPI:   71y Male with ESRD and malfunctioning LUE AVF.      Procedure: Plan for fistulogram 2/2/2022          Assessment & Plan:   71y Male with ESRD and malfunctioning LUE AVF.       - Plan for fistulogram 2/1/2022 or 2/2/2022 pending patient's last meal  - Please keep patient NPO after midnight  - hold all anticoagulation x antiplatelet meds  - STAT labs in the am  - Maintain active T&S x2  - COVID PCR within 5 days of procedure  - Informed consent obtained via verbal phone call with son Yunier Quintero   - DNR modified during IR procedure.   Limited Resuscitation:  No chest compressions,  Intubation OK    Please contact IR with any questions/ concerns regarding above plan at [phone].   Also available on teams.      Interventional Radiology pre-op note    HPI:   71y Male with ESRD and malfunctioning LUE AVF.      Procedure: Plan for fistulogram 2/2/2022          Assessment & Plan:   71y Male with ESRD and malfunctioning LUE AVF.       - Plan for fistulogram 2/2/2022    - Please keep patient NPO after midnight  - hold all anticoagulation x antiplatelet meds  - STAT labs in the am  - Maintain active T&S x2  - COVID PCR within 5 days of procedure  - Informed consent obtained via verbal phone call with son Yunier Quintero   - DNR modified during IR procedure.   Limited Resuscitation:  No chest compressions,  Intubation OK    Please contact IR with any questions/ concerns regarding above plan at [phone].   Also available on teams.      Interventional Radiology pre-op note    HPI:   71y Male with ESRD and malfunctioning LUE AVF.      Procedure: Plan for fistulogram 2/2/2022         Medication:  heparin   Injectable: (02-01)  metoprolol tartrate: (02-01)  midodrine: (02-01)    Vitals:   T(F): 95.7, Max: 97.5 (04:50)  HR: 98  BP: 138/75  RR: 18  SpO2: 93%    PHYSICAL EXAM:  Constitutional: No Acute Distress   Neurological: alert oriented x 1 attention comprehension limited   Pulmonary: on room air non labored  Gastrointestinal: Soft, Non-tender, Non-distended, +bowel sounds x 4   Extremity: LUE + thrill fistula palpated, strong      IMAGING:   VA Duplex Hemodialysis Access, Left (02.01.22 @ 13:18)  LEFT side. The reason for the nonfunction of the fistula is not identified.             Assessment & Plan:   71y Male with ESRD and malfunctioning LUE AVF.       - Plan for fistulogram 2/2/2022    - Please keep patient NPO after midnight  - hold all anticoagulation x antiplatelet meds  - STAT labs in the am  - Maintain active T&S x2  - COVID PCR within 5 days of procedure  - Informed consent obtained via verbal phone call with son Yunier Quintero   - DNR modified during IR procedure.   Limited Resuscitation:  No chest compressions,  Intubation OK    Please contact IR with any questions/ concerns regarding above plan at [phone].   Also available on teams.

## 2022-02-01 NOTE — PROGRESS NOTE ADULT - ASSESSMENT
71 M w volume overload, GI consulted for drop in hgb    1. CHF per cardio    2. ABBY on CKD per renal  -on HD via permacath per renal, midodrine during dialysis  -s/p L AVF, awaiting maturation     3. RP bleed  s/p Abdominal Angiography and Embolization on 10/29/2021 by Interventional radiology of l4and l5 lumbar artery     4. Failure to thrive  -s/p PEG 1/10    -maintain abdominal binder  -tolerating TF, continue TF  - aspiration precautions    5. stress duodenal ulcers  -PPI BID    6. Constipation  -resolved  -nonobstructive gas pattern on x-ray 1/13  -continue Miralax BID  -DC planning to rehab underway       [facility]  Gastroenterology and Hepatology  [address] [place], [place]  Office: [phone]  Cell: [phone] 71 M w volume overload, GI consulted for drop in hgb    1. CHF per cardio    2. ABBY on CKD per renal  -on HD via permacath per renal, midodrine during dialysis  -s/p L AVF, awaiting maturation   -pending fistulogram; if not successful then shiley or perm cath; care as per nephrology     3. RP bleed  s/p Abdominal Angiography and Embolization on 10/29/2021 by Interventional radiology of l4and l5 lumbar artery     4. Failure to thrive  -s/p PEG 1/10    -maintain abdominal binder  -tolerating TF, continue TF  - aspiration precautions    5. stress duodenal ulcers  -PPI BID    6. Constipation  -resolved  -nonobstructive gas pattern on x-ray 1/13  -continue Miralax BID  -DC planning to rehab underway       [facility]  Gastroenterology and Hepatology  [address] [place], [place]  Office: [phone]  Cell: [phone]

## 2022-02-01 NOTE — PROGRESS NOTE ADULT - ASSESSMENT
71M w/ HTN, DM2, CAD-CABG, Parkinson's disease, and advanced CKD, 10/21/21 p/w LE swelling, c/b COVID; now newly ESRD-HD as of this admission    (1)Renal - ESRD-HD TTS - last dialyzed Thursday, unable to have HD sat   (2)Hyponatremia - higher Na+ bath with dialysis    (3)Hypercalcemia - lower calcium bath with dialysis     RECOMMEND  (1)Fistulogram ordered but awaiting doppler signal today;   If the fistulogram is not successful then bridget or perm cath   (2)Nutritional support       DW ACP       [facility]  [phone]

## 2022-02-01 NOTE — PROGRESS NOTE ADULT - ASSESSMENT
72yo M w/ PMHx of CAD (s/p CABG 2019), CKD (unknown stage), DM2, Parkinson's Disease, HTN, depression presents with new onset acute heart failure exacerbation, NSTEMI, started on hep gtt, developed bl RP bleed, acute anemia. sp MICU course for hemorrhagic shock, 10/28 sp IR embolization l4 and l5 lumbar artery. sp permacath and AVF.    # chronic systolic and diastolic heart failure  # ESRD, new HD  # Atrial flutter  # Parkinson's disease   # delirium  # CAD (coronary artery disease)  # HTN  # DM2 (diabetes mellitus, type 2)  # FTT sp PEG   midodrine during dialysis  sp L AVF using for HD, permacath dced  #1/29 malfunctioning Lt AVF- IR eval noted -for fistulogram  2/1/2022 or 2/2/2022    holding Seroquel for lethargy,, CT head no acute finding, known meningioma  psych fu noted  fu recs  ID following, no indication for abx  namenda, sinemet  cymbalta, remeron  trihexyphenidyl  insulin per endo  lopressor, atorvastatin  tube feeds, abd binder  start flomax and dc johnson, voiding trial    DVT ppx hsq    DNR/DNI    dc planning  awaiting rehab placement pending AVF mgmt    [facility]  [phone]

## 2022-02-01 NOTE — PROGRESS NOTE ADULT - SUBJECTIVE AND OBJECTIVE BOX
NEPHROLOGY-[facility] [phone]        Patient seen and examined in bed.  He was the same         MEDICATIONS  (STANDING):  atorvastatin 80 milliGRAM(s) Oral at bedtime  carbidopa/levodopa  25/100 2.5 Tablet(s) Oral <User Schedule>  carbidopa/levodopa  25/100 2 Tablet(s) Oral <User Schedule>  chlorhexidine 4% Liquid 1 Application(s) Topical <User Schedule>  cinacalcet 60 milliGRAM(s) Oral daily  dextrose 40% Gel 15 Gram(s) Oral once  dextrose 5%. 1000 milliLiter(s) (50 mL/Hr) IV Continuous <Continuous>  dextrose 5%. 1000 milliLiter(s) (100 mL/Hr) IV Continuous <Continuous>  dextrose 50% Injectable 25 Gram(s) IV Push once  dextrose 50% Injectable 12.5 Gram(s) IV Push once  dextrose 50% Injectable 25 Gram(s) IV Push once  diVALproex Sprinkle 250 milliGRAM(s) Oral three times a day  DULoxetine 20 milliGRAM(s) Oral daily  folic acid 1 milliGRAM(s) Oral daily  glucagon  Injectable 1 milliGRAM(s) IntraMuscular once  heparin   Injectable 5000 Unit(s) SubCutaneous every 12 hours  insulin glargine Injectable (LANTUS) 6 Unit(s) SubCutaneous at bedtime  insulin lispro (ADMELOG) corrective regimen sliding scale   SubCutaneous every 6 hours  levothyroxine 25 MICROGram(s) Oral daily  memantine 5 milliGRAM(s) Oral at bedtime  metoprolol tartrate 50 milliGRAM(s) Oral two times a day  midodrine 10 milliGRAM(s) Oral <User Schedule>  mirtazapine 7.5 milliGRAM(s) Oral daily  Nephro-celeste 1 Tablet(s) Oral daily  pantoprazole   Suspension 40 milliGRAM(s) Enteral Tube two times a day  QUEtiapine 25 milliGRAM(s) Oral at bedtime  trihexyphenidyl 2 milliGRAM(s) Oral three times a day      VITAL:  T(C): , Max: 36.4 (02-01-22 @ 04:50)  T(F): , Max: 97.5 (02-01-22 @ 04:50)  HR: 93 (02-01-22 @ 11:10)  BP: 133/86 (02-01-22 @ 11:10)  BP(mean): --  RR: 18 (02-01-22 @ 11:10)  SpO2: 93% (02-01-22 @ 11:10)  Wt(kg): --    I and O's:    01-31 @ 07:01  -  02-01 @ 07:00  --------------------------------------------------------  IN: 0 mL / OUT: 800 mL / NET: -800 mL          PHYSICAL EXAM:    Constitutional: NAD  Neck:  No JVD  Respiratory: CTAB/L  Cardiovascular: S1 and S2  Gastrointestinal: BS+, soft, NT/ND  Extremities: No peripheral edema  Neurological:    : No Javier  Skin: No rashes  Access: avf    LABS:                Urine Studies:          RADIOLOGY & ADDITIONAL STUDIES:

## 2022-02-01 NOTE — PROGRESS NOTE ADULT - SUBJECTIVE AND OBJECTIVE BOX
Chief complaint  Patient is a 71y old  Male who presents with a chief complaint of leg swelling (01 Feb 2022 11:50)   Review of systems  Patient in bed, looks comfortable, no hypoglycemic episodes.    Labs and Fingersticks  CAPILLARY BLOOD GLUCOSE      POCT Blood Glucose.: 102 mg/dL (01 Feb 2022 13:07)  POCT Blood Glucose.: 117 mg/dL (01 Feb 2022 07:54)  POCT Blood Glucose.: 109 mg/dL (01 Feb 2022 05:38)  POCT Blood Glucose.: 118 mg/dL (31 Jan 2022 21:22)  POCT Blood Glucose.: 116 mg/dL (31 Jan 2022 16:40)                        Medications  MEDICATIONS  (STANDING):  atorvastatin 80 milliGRAM(s) Oral at bedtime  carbidopa/levodopa  25/100 2.5 Tablet(s) Oral <User Schedule>  carbidopa/levodopa  25/100 2 Tablet(s) Oral <User Schedule>  chlorhexidine 4% Liquid 1 Application(s) Topical <User Schedule>  cinacalcet 60 milliGRAM(s) Oral daily  dextrose 40% Gel 15 Gram(s) Oral once  dextrose 5%. 1000 milliLiter(s) (50 mL/Hr) IV Continuous <Continuous>  dextrose 5%. 1000 milliLiter(s) (100 mL/Hr) IV Continuous <Continuous>  dextrose 50% Injectable 25 Gram(s) IV Push once  dextrose 50% Injectable 12.5 Gram(s) IV Push once  dextrose 50% Injectable 25 Gram(s) IV Push once  diVALproex Sprinkle 250 milliGRAM(s) Oral three times a day  DULoxetine 20 milliGRAM(s) Oral daily  folic acid 1 milliGRAM(s) Oral daily  glucagon  Injectable 1 milliGRAM(s) IntraMuscular once  heparin   Injectable 5000 Unit(s) SubCutaneous every 12 hours  insulin glargine Injectable (LANTUS) 6 Unit(s) SubCutaneous at bedtime  insulin lispro (ADMELOG) corrective regimen sliding scale   SubCutaneous every 6 hours  levothyroxine 25 MICROGram(s) Oral daily  memantine 5 milliGRAM(s) Oral at bedtime  metoprolol tartrate 50 milliGRAM(s) Oral two times a day  midodrine 10 milliGRAM(s) Oral <User Schedule>  mirtazapine 7.5 milliGRAM(s) Oral daily  Nephro-celeste 1 Tablet(s) Oral daily  pantoprazole   Suspension 40 milliGRAM(s) Enteral Tube two times a day  QUEtiapine 25 milliGRAM(s) Oral at bedtime  tamsulosin 0.4 milliGRAM(s) Oral at bedtime  trihexyphenidyl 2 milliGRAM(s) Oral three times a day      Physical Exam  General: Patient comfortable in bed  Vital Signs Last 12 Hrs  T(F): 97.5 (02-01-22 @ 04:50), Max: 97.5 (02-01-22 @ 04:50)  HR: 124 (02-01-22 @ 12:29) (92 - 124)  BP: 133/83 (02-01-22 @ 12:29) (131/81 - 133/86)  BP(mean): --  RR: 18 (02-01-22 @ 12:29) (18 - 18)  SpO2: 93% (02-01-22 @ 12:29) (93% - 97%)  Neck: No palpable thyroid nodules.  CVS: S1S2, No murmurs  Respiratory: No wheezing, no crepitations  GI: Abdomen soft, bowel sounds positive  Musculoskeletal:  edema lower extremities.   Skin: No skin rashes, no ecchymosis    Diagnostics             Chief complaint  Patient is a 71y old  Male who presents with a chief complaint of leg swelling (01 Feb 2022 11:50)   Review of systems  Patient in bed, looks comfortable, no hypoglycemic episodes.    Labs and Fingersticks  CAPILLARY BLOOD GLUCOSE      POCT Blood Glucose.: 102 mg/dL (01 Feb 2022 13:07)  POCT Blood Glucose.: 117 mg/dL (01 Feb 2022 07:54)  POCT Blood Glucose.: 109 mg/dL (01 Feb 2022 05:38)  POCT Blood Glucose.: 118 mg/dL (31 Jan 2022 21:22)  POCT Blood Glucose.: 116 mg/dL (31 Jan 2022 16:40)                        Medications  MEDICATIONS  (STANDING):  atorvastatin 80 milliGRAM(s) Oral at bedtime  carbidopa/levodopa  25/100 2.5 Tablet(s) Oral <User Schedule>  carbidopa/levodopa  25/100 2 Tablet(s) Oral <User Schedule>  chlorhexidine 4% Liquid 1 Application(s) Topical <User Schedule>  cinacalcet 60 milliGRAM(s) Oral daily  dextrose 40% Gel 15 Gram(s) Oral once  dextrose 5%. 1000 milliLiter(s) (50 mL/Hr) IV Continuous <Continuous>  dextrose 5%. 1000 milliLiter(s) (100 mL/Hr) IV Continuous <Continuous>  dextrose 50% Injectable 25 Gram(s) IV Push once  dextrose 50% Injectable 12.5 Gram(s) IV Push once  dextrose 50% Injectable 25 Gram(s) IV Push once  diVALproex Sprinkle 250 milliGRAM(s) Oral three times a day  DULoxetine 20 milliGRAM(s) Oral daily  folic acid 1 milliGRAM(s) Oral daily  glucagon  Injectable 1 milliGRAM(s) IntraMuscular once  heparin   Injectable 5000 Unit(s) SubCutaneous every 12 hours  insulin glargine Injectable (LANTUS) 6 Unit(s) SubCutaneous at bedtime  insulin lispro (ADMELOG) corrective regimen sliding scale   SubCutaneous every 6 hours  levothyroxine 25 MICROGram(s) Oral daily  memantine 5 milliGRAM(s) Oral at bedtime  metoprolol tartrate 50 milliGRAM(s) Oral two times a day  midodrine 10 milliGRAM(s) Oral <User Schedule>  mirtazapine 7.5 milliGRAM(s) Oral daily  Nephro-celeste 1 Tablet(s) Oral daily  pantoprazole   Suspension 40 milliGRAM(s) Enteral Tube two times a day  QUEtiapine 25 milliGRAM(s) Oral at bedtime  tamsulosin 0.4 milliGRAM(s) Oral at bedtime  trihexyphenidyl 2 milliGRAM(s) Oral three times a day      Physical Exam  General: Patient comfortable in bed  Vital Signs Last 12 Hrs  T(F): 97.5 (02-01-22 @ 04:50), Max: 97.5 (02-01-22 @ 04:50)  HR: 124 (02-01-22 @ 12:29) (92 - 124)  BP: 133/83 (02-01-22 @ 12:29) (131/81 - 133/86)  BP(mean): --  RR: 18 (02-01-22 @ 12:29) (18 - 18)  SpO2: 93% (02-01-22 @ 12:29) (93% - 97%)  Neck: No palpable thyroid nodules.  CVS: S1S2, No murmurs  Respiratory: No wheezing, no crepitations  GI: Abdomen soft, bowel sounds positive  Musculoskeletal:  edema lower extremities.   Skin: No skin rashes, no ecchymosis    Diagnostics

## 2022-02-01 NOTE — PROGRESS NOTE ADULT - SUBJECTIVE AND OBJECTIVE BOX
[facility]      Seen earlier today, and examined.  - Today, patient is without complaints.           *****MEDICATIONS: Current medication reviewed and documented.    MEDICATIONS  (STANDING):  atorvastatin 80 milliGRAM(s) Oral at bedtime  carbidopa/levodopa  25/100 2.5 Tablet(s) Oral <User Schedule>  carbidopa/levodopa  25/100 2 Tablet(s) Oral <User Schedule>  chlorhexidine 4% Liquid 1 Application(s) Topical <User Schedule>  cinacalcet 60 milliGRAM(s) Oral daily  dextrose 40% Gel 15 Gram(s) Oral once  dextrose 5%. 1000 milliLiter(s) (50 mL/Hr) IV Continuous <Continuous>  dextrose 5%. 1000 milliLiter(s) (100 mL/Hr) IV Continuous <Continuous>  dextrose 50% Injectable 25 Gram(s) IV Push once  dextrose 50% Injectable 12.5 Gram(s) IV Push once  dextrose 50% Injectable 25 Gram(s) IV Push once  diVALproex Sprinkle 250 milliGRAM(s) Oral three times a day  DULoxetine 20 milliGRAM(s) Oral daily  folic acid 1 milliGRAM(s) Oral daily  glucagon  Injectable 1 milliGRAM(s) IntraMuscular once  heparin   Injectable 5000 Unit(s) SubCutaneous every 12 hours  insulin glargine Injectable (LANTUS) 6 Unit(s) SubCutaneous at bedtime  insulin lispro (ADMELOG) corrective regimen sliding scale   SubCutaneous every 6 hours  levothyroxine 25 MICROGram(s) Oral daily  memantine 5 milliGRAM(s) Oral at bedtime  metoprolol tartrate 50 milliGRAM(s) Oral two times a day  midodrine 10 milliGRAM(s) Oral <User Schedule>  mirtazapine 7.5 milliGRAM(s) Oral daily  Nephro-celeste 1 Tablet(s) Oral daily  pantoprazole   Suspension 40 milliGRAM(s) Enteral Tube two times a day  QUEtiapine 25 milliGRAM(s) Oral at bedtime  trihexyphenidyl 2 milliGRAM(s) Oral three times a day    MEDICATIONS  (PRN):  acetaminophen     Tablet .. 650 milliGRAM(s) Oral every 6 hours PRN Mild Pain (1 - 3)  albuterol/ipratropium for Nebulization 3 milliLiter(s) Nebulizer every 6 hours PRN Shortness of Breath and/or Wheezing  diVALproex Sprinkle 125 milliGRAM(s) Oral every 8 hours PRN Agitation  guaiFENesin Oral Liquid (Sugar-Free) 200 milliGRAM(s) Oral every 6 hours PRN Cough  ondansetron Injectable 4 milliGRAM(s) IV Push once PRN Nausea and/or Vomiting  QUEtiapine 12.5 milliGRAM(s) Oral every 6 hours PRN agitation  sodium chloride 0.9% lock flush 10 milliLiter(s) IV Push every 1 hour PRN Pre/post blood products, medications, blood draw, and to maintain line patency          ***** VITAL SIGNS:  T(F): 97.5 (02-01-22 @ 04:50), Max: 97.5 (02-01-22 @ 04:50)  HR: 92 (02-01-22 @ 04:50) (91 - 99)  BP: 131/81 (02-01-22 @ 04:50) (131/81 - 157/95)  RR: 18 (02-01-22 @ 04:50) (18 - 18)  SpO2: 97% (02-01-22 @ 04:50) (95% - 98%)  Wt(kg): --  ,   I&O's Summary    30 Jan 2022 07:01  -  31 Jan 2022 07:00  --------------------------------------------------------  IN: 0 mL / OUT: 600 mL / NET: -600 mL    31 Jan 2022 07:01  -  01 Feb 2022 06:17  --------------------------------------------------------  IN: 0 mL / OUT: 800 mL / NET: -800 mL             *****PHYSICAL EXAM:   alert oriented x 1 attention comprehension are limited   confused   tangential     Able to name, repeat.   EOmi fundi not visualized   no nystagmus VFF to confrontation  Tongue is midline  Palate elevates symmetrically   Moving all 4 ext spontaneously no drift appreciated    Gait not assessed.            *****LAB AND IMAGING:                                         [All pertinent recent Imaging/Reports reviewed]           *****A S S E S S M E N T   A N D   P L A N :  70yo M w/ PMHx of CAD (s/p CABG 2019), CKD (unknown stage), DM2, Parkinson's Disease, HTN, depression presents with bilateral leg swelling, patient's daughter in-law at bedside Yoly Quintero ([address] [place] [address] Nurse) provides the history, the daughter reports the patient is a poor historian, unable to remember having conversations with others and likely cannot weigh risk/benefits of medical conditions, she reports that he has had bilateral lower extremity swelling for the past few months, but over the past 2 weeks it has significantly worsened, he has further difficulty ambulating due to swelling, his outpatient provider attempted to manage with 20mg lasix and then increased to 40mg lasix but the patient never took the increased dose, his symptoms ar persistent throughout the day and are extremely severe, he has developed wounds of the legs with weeping of fluid due to the swelling, she reports that the patient is frequently non-compliant with medications and medical management, he lives at home with his son and daughter in law, he denies chest pain, shortness of breath, fever/chills, cough, sputum, in the ED, he was tachycardic but hemodynamically stable, afebrile, saturating well on room air, labs were significant for elevated BNP, elevated creatinine, imaging showed moderate left pleural effusion, patient was admitted to general medicine for further management          Problem/Recommendations 1:ams of unclear etiology   likely related to multiple metabolic derangements related to uremia  sleep wake cycle disruption and poor nutritional intake  would regulate sleep wake cycle  s/p peg   doing ok      more awake cooperative   but confused     continue current regimen  placement issues     overall prognosis is poor           Thank you for allowing me to participate in the care of this patient. Will continue to follow patient periodically. Please do not hesitate to call me if you have any  questions or if there has been a change in patients neurological status     ________________  Lily Fang MD  [facility]  [phone]      33 minutes spent on total encounter; more than 50 % of the visit was  spent counseling about plan of care, compliance to diet/exercise and medication regimen and or  coordinating care by the attending physician.      It is advised that stroke patients follow up with ZACH Albarran @ [phone] in 1- 2 weeks.   Others please follow up with Dr. Michael Nissenbaum [phone]

## 2022-02-01 NOTE — CONSULT NOTE ADULT - ASSESSMENT
Interventional Radiology    Evaluate for Procedure:     HPI: 71y Male with ESRD (HD TTS) with malfunctioning AVF.    Allergies: adhesives (Rash)  latex (Urticaria)    Medications (Abx/Cardiac/Anticoagulation/Blood Products)    heparin   Injectable: 5000 Unit(s) SubCutaneous (02-01 @ 06:04)  metoprolol tartrate: 50 milliGRAM(s) Oral (02-01 @ 11:13)  midodrine: 10 milliGRAM(s) Oral (02-01 @ 11:13)    Data:    T(C): 36.4  HR: 124  BP: 133/83  RR: 18  SpO2: 93%    -WBC 10.03 / HgB 9.6 / Hct 31.0 / Plt 356  -Na 131 / Cl 93 / BUN 81 / Glucose 158  -K 4.3 / CO2 23 / Cr 3.56  -INR 0.93 / PTT 35.4    Radiology:   Reviewed fistula ultrasound with attending. Patent AVF with small hematoma.    Assessment/Plan:   -71y Male with ESRD and malfunctioning AVF.    - Plan for fistulogram 2/1/2022 or 2/2/2022 pending patient's last meal  - Please place procedure under Dr. Thorpe  - If procedure 2/2/2022, place patient NPO at midnight  - AM CBC, Coags  - Hold AM anticoagulation

## 2022-02-02 NOTE — PRE-ANESTHESIA EVALUATION ADULT - NSANTHPMHFT_GEN_ALL_CORE
72yo M w/ PMHx of CAD (s/p CABG 2019), CKD (unknown stage), DM2, Parkinson's Disease, HTN, depression presents with new onset acute heart failure exacerbation, NSTEMI, started on hep gtt, developed bl RP bleed, acute anemia. sp MICU course for hemorrhagic shock, 10/28 sp IR embolization l4 and l5 lumbar artery. sp permacath and AVF

## 2022-02-02 NOTE — PROGRESS NOTE ADULT - SUBJECTIVE AND OBJECTIVE BOX
Chief Complaint:  Patient is a 71y old  Male who presents with a chief complaint of leg swelling (02 Feb 2022 10:07)      Date of service 02-02-22 @ 10:32      Interval Events:   Patient seen and examined. No acute overnight events.     Hospital Medications:  acetaminophen     Tablet .. 650 milliGRAM(s) Oral every 6 hours PRN  albuterol/ipratropium for Nebulization 3 milliLiter(s) Nebulizer every 6 hours PRN  atorvastatin 80 milliGRAM(s) Oral at bedtime  carbidopa/levodopa  25/100 2.5 Tablet(s) Oral <User Schedule>  carbidopa/levodopa  25/100 2 Tablet(s) Oral <User Schedule>  chlorhexidine 4% Liquid 1 Application(s) Topical <User Schedule>  cinacalcet 60 milliGRAM(s) Oral daily  dextrose 40% Gel 15 Gram(s) Oral once  dextrose 5%. 1000 milliLiter(s) IV Continuous <Continuous>  dextrose 5%. 1000 milliLiter(s) IV Continuous <Continuous>  dextrose 50% Injectable 25 Gram(s) IV Push once  dextrose 50% Injectable 12.5 Gram(s) IV Push once  dextrose 50% Injectable 25 Gram(s) IV Push once  diVALproex Sprinkle 125 milliGRAM(s) Oral every 8 hours PRN  diVALproex Sprinkle 250 milliGRAM(s) Oral three times a day  DULoxetine 20 milliGRAM(s) Oral daily  folic acid 1 milliGRAM(s) Oral daily  glucagon  Injectable 1 milliGRAM(s) IntraMuscular once  guaiFENesin Oral Liquid (Sugar-Free) 200 milliGRAM(s) Oral every 6 hours PRN  insulin glargine Injectable (LANTUS) 6 Unit(s) SubCutaneous at bedtime  insulin lispro (ADMELOG) corrective regimen sliding scale   SubCutaneous every 6 hours  levothyroxine 25 MICROGram(s) Oral daily  memantine 5 milliGRAM(s) Oral at bedtime  metoprolol tartrate 50 milliGRAM(s) Oral two times a day  midodrine 10 milliGRAM(s) Oral <User Schedule>  mirtazapine 7.5 milliGRAM(s) Oral daily  Nephro-celeste 1 Tablet(s) Oral daily  ondansetron Injectable 4 milliGRAM(s) IV Push once PRN  pantoprazole   Suspension 40 milliGRAM(s) Enteral Tube two times a day  QUEtiapine 12.5 milliGRAM(s) Oral every 6 hours PRN  QUEtiapine 25 milliGRAM(s) Oral at bedtime  sodium chloride 0.9% lock flush 10 milliLiter(s) IV Push every 1 hour PRN  tamsulosin 0.4 milliGRAM(s) Oral at bedtime  trihexyphenidyl 2 milliGRAM(s) Oral three times a day        Review of Systems:  unable to obtain    PHYSICAL EXAM:   Vital Signs:  Vital Signs Last 24 Hrs  T(C): 37.1 (02 Feb 2022 03:42), Max: 37.4 (01 Feb 2022 22:30)  T(F): 98.7 (02 Feb 2022 03:42), Max: 99.3 (01 Feb 2022 22:30)  HR: 95 (02 Feb 2022 03:42) (93 - 124)  BP: 144/68 (02 Feb 2022 03:42) (132/71 - 144/68)  BP(mean): --  RR: 18 (02 Feb 2022 03:42) (18 - 18)  SpO2: 100% (02 Feb 2022 03:42) (93% - 100%)  Daily     Daily       PHYSICAL EXAM:     GENERAL:  Appears stated age, well-groomed, well-nourished, no distress  HEENT:  NC/AT,  conjunctivae anicteric, clear and pink,   NECK: supple, trachea midline  CHEST:  Full & symmetric excursion, no increased effort, breath sounds clear  HEART:  Regular rhythm, no JVD  ABDOMEN:  Soft, non-tender, non-distended, normoactive bowel sounds,  no masses , no hepatosplenomegaly, +gastrostomy  EXTREMITIES:  no cyanosis,clubbing or edema  SKIN:  No rash, erythema, or, ecchymoses, no jaundice  NEURO:  non-focal, no asterixis  RECTAL: Deferred      LABS Personally reviewed by me:                        8.1    7.45  )-----------( 347      ( 02 Feb 2022 05:46 )             25.4     Mean Cell Volume: 104.5 fl (02-02-22 @ 05:46)    02-02    131<L>  |  94<L>  |  94<H>  ----------------------------<  88  5.0   |  22  |  4.64<H>    Ca    9.4      02 Feb 2022 05:45        PT/INR - ( 02 Feb 2022 05:46 )   PT: 12.6 sec;   INR: 1.05 ratio                                     8.1    7.45  )-----------( 347      ( 02 Feb 2022 05:46 )             25.4       Imaging personally reviewed by me:

## 2022-02-02 NOTE — PROGRESS NOTE ADULT - SUBJECTIVE AND OBJECTIVE BOX
[facility]      Seen earlier today, and examined.  - Today, patient is without complaints.           *****MEDICATIONS: Current medication reviewed and documented.    MEDICATIONS  (STANDING):  atorvastatin 80 milliGRAM(s) Oral at bedtime  carbidopa/levodopa  25/100 2.5 Tablet(s) Oral <User Schedule>  carbidopa/levodopa  25/100 2 Tablet(s) Oral <User Schedule>  chlorhexidine 4% Liquid 1 Application(s) Topical <User Schedule>  cinacalcet 60 milliGRAM(s) Oral daily  dextrose 40% Gel 15 Gram(s) Oral once  dextrose 5%. 1000 milliLiter(s) (50 mL/Hr) IV Continuous <Continuous>  dextrose 5%. 1000 milliLiter(s) (100 mL/Hr) IV Continuous <Continuous>  dextrose 50% Injectable 25 Gram(s) IV Push once  dextrose 50% Injectable 12.5 Gram(s) IV Push once  dextrose 50% Injectable 25 Gram(s) IV Push once  diVALproex Sprinkle 250 milliGRAM(s) Oral three times a day  DULoxetine 20 milliGRAM(s) Oral daily  folic acid 1 milliGRAM(s) Oral daily  glucagon  Injectable 1 milliGRAM(s) IntraMuscular once  insulin glargine Injectable (LANTUS) 6 Unit(s) SubCutaneous at bedtime  insulin lispro (ADMELOG) corrective regimen sliding scale   SubCutaneous every 6 hours  levothyroxine 25 MICROGram(s) Oral daily  memantine 5 milliGRAM(s) Oral at bedtime  metoprolol tartrate 50 milliGRAM(s) Oral two times a day  midodrine 10 milliGRAM(s) Oral <User Schedule>  mirtazapine 7.5 milliGRAM(s) Oral daily  Nephro-celeste 1 Tablet(s) Oral daily  pantoprazole   Suspension 40 milliGRAM(s) Enteral Tube two times a day  QUEtiapine 25 milliGRAM(s) Oral at bedtime  tamsulosin 0.4 milliGRAM(s) Oral at bedtime  trihexyphenidyl 2 milliGRAM(s) Oral three times a day    MEDICATIONS  (PRN):  acetaminophen     Tablet .. 650 milliGRAM(s) Oral every 6 hours PRN Mild Pain (1 - 3)  albuterol/ipratropium for Nebulization 3 milliLiter(s) Nebulizer every 6 hours PRN Shortness of Breath and/or Wheezing  diVALproex Sprinkle 125 milliGRAM(s) Oral every 8 hours PRN Agitation  guaiFENesin Oral Liquid (Sugar-Free) 200 milliGRAM(s) Oral every 6 hours PRN Cough  ondansetron Injectable 4 milliGRAM(s) IV Push once PRN Nausea and/or Vomiting  QUEtiapine 12.5 milliGRAM(s) Oral every 6 hours PRN agitation  sodium chloride 0.9% lock flush 10 milliLiter(s) IV Push every 1 hour PRN Pre/post blood products, medications, blood draw, and to maintain line patency          ***** VITAL SIGNS:  T(F): 97.1 (22 @ 11:08), Max: 99.3 (22 @ 22:30)  HR: 86 (22 @ 11:08) (86 - 107)  BP: 133/75 (22 @ 11:08) (132/71 - 144/68)  RR: 18 (22 @ 11:08) (18 - 18)  SpO2: 95% (22 @ 11:08) (93% - 100%)  Wt(kg): --  ,   I&O's Summary    2022 07:01  -  2022 07:00  --------------------------------------------------------  IN: 0 mL / OUT: 425 mL / NET: -425 mL             *****PHYSICAL EXAM: awake  alert oriented x 1 attention comprehension are limited   confused   tangential       Able to name, repeat.      Tongue is midline   functional quad   Gait not assessed.            *****LAB AND IMAGIN.1    7.45  )-----------( 347      ( 2022 05:46 )             25.4                   131<L>  |  94<L>  |  94<H>  ----------------------------<  88  5.0   |  22  |  4.64<H>    Ca    9.4      2022 05:45      PT/INR - ( 2022 05:46 )   PT: 12.6 sec;   INR: 1.05 ratio                              [All pertinent recent Imaging/Reports reviewed]           *****A S S E S S M E N T   A N D   P L A N :    70yo M w/ PMHx of CAD (s/p CABG ), CKD (unknown stage), DM2, Parkinson's Disease, HTN, depression presents with bilateral leg swelling, patient's daughter in-law at bedside Yoly Quintero ([address] [place] [address] Nurse) provides the history, the daughter reports the patient is a poor historian, unable to remember having conversations with others and likely cannot weigh risk/benefits of medical conditions, she reports that he has had bilateral lower extremity swelling for the past few months, but over the past 2 weeks it has significantly worsened, he has further difficulty ambulating due to swelling, his outpatient provider attempted to manage with 20mg lasix and then increased to 40mg lasix but the patient never took the increased dose, his symptoms ar persistent throughout the day and are extremely severe, he has developed wounds of the legs with weeping of fluid due to the swelling, she reports that the patient is frequently non-compliant with medications and medical management, he lives at home with his son and daughter in law, he denies chest pain, shortness of breath, fever/chills, cough, sputum, in the ED, he was tachycardic but hemodynamically stable, afebrile, saturating well on room air, labs were significant for elevated BNP, elevated creatinine, imaging showed moderate left pleural effusion, patient was admitted to general medicine for further management          Problem/Recommendations 1:ams of unclear etiology   likely related to multiple metabolic derangements related to uremia  sleep wake cycle disruption and poor nutritional intake  would regulate sleep wake cycle  s/p peg   doing ok      more awake cooperative   but confused     continue current regimen  placement issues     overall prognosis is poor       Thank you for allowing me to participate in the care of this patient. Will continue to follow patient periodically. Please do not hesitate to call me if you have any  questions or if there has been a change in patients neurological status     ________________  Lily Fang MD  [facility] ([facility]  [phone]      33 minutes spent on total encounter; more than 50 % of the visit was  spent counseling about plan of care, compliance to diet/exercise and medication regimen and or  coordinating care by the attending physician.      It is advised that stroke patients follow up with ZACH Albarran @ [phone] in 1- 2 weeks.   Others please follow up with Dr. Michael Nissenbaum [phone]

## 2022-02-02 NOTE — PROGRESS NOTE ADULT - ASSESSMENT
Assessment  DMT2: 71y Male with DM T2 with hyperglycemia, A1C 6.4%, was on insulin at home, now on basal insulin and coverage, blood sugars in acceptable range/trending down today, no hypoglycemias. Patient with AVF malfunction, NPO for IR procedure.  Hypothyroidism: Patient has no history thyroid disease, was not on any thyroid supplements, subclinical with low-normal FT4, lethargic, on synthroid 25 mcg po daily, repeat TFTs improved and euthyroid.  Hypercalcemia: Started on Sensipar 60mg daily, Ca improving.  CHF: on medications, stable, monitored.  HTN: Controlled,  on antihypertensive medications.  Parkinsons: on meds, monitored.  CKD: Monitor labs/BMP      Dr Ayers  Cell : [phone]   Assessment  DMT2: 71y Male with DM T2 with hyperglycemia, A1C 6.4%, was on insulin at home, now on basal insulin and coverage, blood sugars in acceptable range/trending down today, no hypoglycemias.  Patient with AVF malfunction, NPO for IR procedure.  Hypothyroidism: Patient has no history thyroid disease, was not on any thyroid supplements, subclinical with low-normal FT4, lethargic, on synthroid 25 mcg po daily, repeat TFTs improved and euthyroid.  Hypercalcemia: Started on Sensipar 60mg daily, Ca improving.  CHF: on medications, stable, monitored.  HTN: Controlled,  on antihypertensive medications.  Parkinsons: on meds, monitored.  CKD: Monitor labs/BMP      Dr Ayers  Cell : [phone]

## 2022-02-02 NOTE — PROGRESS NOTE ADULT - SUBJECTIVE AND OBJECTIVE BOX
[facility] [phone]        Patient seen and examined in bed.   He was about the same and still confused         MEDICATIONS  (STANDING):  atorvastatin 80 milliGRAM(s) Oral at bedtime  carbidopa/levodopa  25/100 2.5 Tablet(s) Oral <User Schedule>  carbidopa/levodopa  25/100 2 Tablet(s) Oral <User Schedule>  chlorhexidine 4% Liquid 1 Application(s) Topical <User Schedule>  cinacalcet 60 milliGRAM(s) Oral daily  dextrose 40% Gel 15 Gram(s) Oral once  dextrose 5%. 1000 milliLiter(s) (50 mL/Hr) IV Continuous <Continuous>  dextrose 5%. 1000 milliLiter(s) (100 mL/Hr) IV Continuous <Continuous>  dextrose 50% Injectable 25 Gram(s) IV Push once  dextrose 50% Injectable 12.5 Gram(s) IV Push once  dextrose 50% Injectable 25 Gram(s) IV Push once  diVALproex Sprinkle 250 milliGRAM(s) Oral three times a day  DULoxetine 20 milliGRAM(s) Oral daily  folic acid 1 milliGRAM(s) Oral daily  glucagon  Injectable 1 milliGRAM(s) IntraMuscular once  insulin glargine Injectable (LANTUS) 6 Unit(s) SubCutaneous at bedtime  insulin lispro (ADMELOG) corrective regimen sliding scale   SubCutaneous every 6 hours  levothyroxine 25 MICROGram(s) Oral daily  memantine 5 milliGRAM(s) Oral at bedtime  metoprolol tartrate 50 milliGRAM(s) Oral two times a day  midodrine 10 milliGRAM(s) Oral <User Schedule>  mirtazapine 7.5 milliGRAM(s) Oral daily  Nephro-celeste 1 Tablet(s) Oral daily  pantoprazole   Suspension 40 milliGRAM(s) Enteral Tube two times a day  QUEtiapine 25 milliGRAM(s) Oral at bedtime  tamsulosin 0.4 milliGRAM(s) Oral at bedtime  trihexyphenidyl 2 milliGRAM(s) Oral three times a day      VITAL:  T(C): , Max: 37.4 (02-01-22 @ 22:30)  T(F): , Max: 99.3 (02-01-22 @ 22:30)  HR: 95 (02-02-22 @ 03:42)  BP: 144/68 (02-02-22 @ 03:42)  BP(mean): --  RR: 18 (02-02-22 @ 03:42)  SpO2: 100% (02-02-22 @ 03:42)  Wt(kg): --    I and O's:    02-01 @ 07:01  -  02-02 @ 07:00  --------------------------------------------------------  IN: 0 mL / OUT: 425 mL / NET: -425 mL          PHYSICAL EXAM:    Constitutional: NAD; confused   Neck:  No JVD  Respiratory: CTAB/L  Cardiovascular: S1 and S2  Gastrointestinal: BS+, soft, NT/ND  Extremities: No peripheral edema  Neurological:    : No Javier  Skin: No rashes  Access: avf     LABS:                        8.1    7.45  )-----------( 347      ( 02 Feb 2022 05:46 )             25.4     02-02    131<L>  |  94<L>  |  94<H>  ----------------------------<  88  5.0   |  22  |  4.64<H>    Ca    9.4      02 Feb 2022 05:45            Urine Studies:          RADIOLOGY & ADDITIONAL STUDIES:          < from: [facility] Duplex Hemodialysis Access, Left (02.01.22 @ 13:18) >    ACC: [id number] EXAM:  DUPLEX HEMODIALYSIS ACCESS LT                          PROCEDURE DATE:  02/01/2022          INTERPRETATION:  History: End-stage renal disease, Covid positive,   nonfunctioning dialysis access right upper extremity.    Limited examination.    A dialysis access fistula was constructed by the surgical anastomosis of   the left cephalic vein to the brachial artery in the distal upper arm.    There is again imaged a small hematoma at the surgical anastomosis.    There is a brisklow resistance pattern of antegrade flow through the   left brachial artery proximal to the surgical anastomosis.    There is a high resistance pattern of antegrade flow through the left   brachial artery distal to the surgical anastomosis. There is an irregular   heart rate.    There is turbulent flow in the egress cephalic vein at the surgical   anastomosis.    Velocities through the fistula were measured at 129/56, 265/151, 292/119   cm/s.    No stenosis is identified along the course of the egress cephalic vein.    Impression: The velocities measured through the fistula are on the low   side. The reason for the nonfunction of the fistula is not identified.    --- End of Report ---            SELINA MAGDALENO MD; Attending Radiologist  Thisdocument has been electronically signed. Feb 1 2022  3:26PM    < end of copied text >

## 2022-02-02 NOTE — PRE-ANESTHESIA EVALUATION ADULT - NSANTHAIRWAYFT_ENT_ALL_CORE
Mouth opening: >2cm  Thyromental distance: < 3FB  Upper lip bite: adequate  Cervical ROM: grossly intact
FROM

## 2022-02-02 NOTE — PROGRESS NOTE ADULT - ASSESSMENT
71M w/ HTN, DM2, CAD-CABG, Parkinson's disease, and advanced CKD, 10/21/21 p/w LE swelling, c/b COVID; now newly ESRD-HD as of this admission    (1)Renal - ESRD-HD TTS - last dialyzed Thursday  (2)Hyponatremia - higher Na+ bath with dialysis        RECOMMEND  (1)Fistulogram ordered but awaiting doppler signal today;   Fistulogram today and possible perm cath if the AVF can not be dilated    (2)Nutritional support       DW ACP       [facility]  [phone]

## 2022-02-02 NOTE — PROGRESS NOTE ADULT - ASSESSMENT
70yo M w/ PMHx of CAD (s/p CABG 2019), CKD (unknown stage), DM2, Parkinson's Disease, HTN, depression presents with new onset acute heart failure exacerbation, NSTEMI, started on hep gtt, developed bl RP bleed, acute anemia. sp MICU course for hemorrhagic shock, 10/28 sp IR embolization l4 and l5 lumbar artery. sp permacath and AVF.    # chronic systolic and diastolic heart failure  # ESRD, new HD  # Atrial flutter  # Parkinson's disease   # delirium  # CAD (coronary artery disease)  # HTN  # DM2 (diabetes mellitus, type 2)  # FTT sp PEG  midodrine during dialysis  sp L AVF using for HD, permacath dced  1/29 malfunctioning Lt AVF - IR eval noted - for fistulogram  holding Seroquel for lethargy  ID following, no indication for abx  insulin per endo  lopressor, atorvastatin  peg tube for tube feeds, abd binder  flomax    DVT ppx hsq    DNR/DNI    [facility]  [phone]

## 2022-02-02 NOTE — PROGRESS NOTE ADULT - SUBJECTIVE AND OBJECTIVE BOX
Patient is a 71y old  Male who presents with a chief complaint of leg swelling (02 Feb 2022 12:42)      SUBJECTIVE / OVERNIGHT EVENTS:    Patient seen and examined. sleeping. cannot provide ros.      Vital Signs Last 24 Hrs  T(C): 36.2 (02 Feb 2022 11:08), Max: 37.4 (01 Feb 2022 22:30)  T(F): 97.1 (02 Feb 2022 11:08), Max: 99.3 (01 Feb 2022 22:30)  HR: 86 (02 Feb 2022 11:08) (86 - 107)  BP: 133/75 (02 Feb 2022 11:08) (132/71 - 144/68)  BP(mean): --  RR: 18 (02 Feb 2022 11:08) (18 - 18)  SpO2: 95% (02 Feb 2022 11:08) (93% - 100%)  I&O's Summary    01 Feb 2022 07:01  -  02 Feb 2022 07:00  --------------------------------------------------------  IN: 0 mL / OUT: 425 mL / NET: -425 mL        PE:  GENERAL: NAD, AAOx3  HEAD:  Atraumatic, Normocephalic  EYES: EOMI, PERRLA, conjunctiva and sclera clear  NECK: Supple, No JVD  CHEST/LUNG: CTABL, No wheeze  HEART: Regular rate and rhythm; + murmur  ABDOMEN: Soft, Nontender, Nondistended; Bowel sounds present  EXTREMITIES:  2+ Peripheral Pulses, No clubbing, cyanosis, or edema  SKIN: No rashes or lesions  NEURO: No focal deficits    LABS:                        8.1    7.45  )-----------( 347      ( 02 Feb 2022 05:46 )             25.4     02-02    131<L>  |  94<L>  |  94<H>  ----------------------------<  88  5.0   |  22  |  4.64<H>    Ca    9.4      02 Feb 2022 05:45      PT/INR - ( 02 Feb 2022 05:46 )   PT: 12.6 sec;   INR: 1.05 ratio           CAPILLARY BLOOD GLUCOSE      POCT Blood Glucose.: 84 mg/dL (02 Feb 2022 11:49)  POCT Blood Glucose.: 90 mg/dL (02 Feb 2022 07:26)  POCT Blood Glucose.: 147 mg/dL (02 Feb 2022 01:30)  POCT Blood Glucose.: 70 mg/dL (02 Feb 2022 00:26)  POCT Blood Glucose.: 78 mg/dL (01 Feb 2022 22:28)  POCT Blood Glucose.: 94 mg/dL (01 Feb 2022 17:59)            RADIOLOGY & ADDITIONAL TESTS:    Imaging Personally Reviewed:  [x] YES  [ ] NO    Consultant(s) Notes Reviewed:  [x] YES  [ ] NO    MEDICATIONS  (STANDING):  atorvastatin 80 milliGRAM(s) Oral at bedtime  carbidopa/levodopa  25/100 2.5 Tablet(s) Oral <User Schedule>  carbidopa/levodopa  25/100 2 Tablet(s) Oral <User Schedule>  chlorhexidine 4% Liquid 1 Application(s) Topical <User Schedule>  cinacalcet 60 milliGRAM(s) Oral daily  dextrose 40% Gel 15 Gram(s) Oral once  dextrose 5%. 1000 milliLiter(s) (50 mL/Hr) IV Continuous <Continuous>  dextrose 5%. 1000 milliLiter(s) (100 mL/Hr) IV Continuous <Continuous>  dextrose 50% Injectable 25 Gram(s) IV Push once  dextrose 50% Injectable 12.5 Gram(s) IV Push once  dextrose 50% Injectable 25 Gram(s) IV Push once  dextrose 50% Injectable 25 Gram(s) IV Push once  diVALproex Sprinkle 250 milliGRAM(s) Oral three times a day  DULoxetine 20 milliGRAM(s) Oral daily  folic acid 1 milliGRAM(s) Oral daily  glucagon  Injectable 1 milliGRAM(s) IntraMuscular once  insulin glargine Injectable (LANTUS) 6 Unit(s) SubCutaneous at bedtime  insulin lispro (ADMELOG) corrective regimen sliding scale   SubCutaneous every 6 hours  levothyroxine 25 MICROGram(s) Oral daily  memantine 5 milliGRAM(s) Oral at bedtime  metoprolol tartrate 50 milliGRAM(s) Oral two times a day  midodrine 10 milliGRAM(s) Oral <User Schedule>  mirtazapine 7.5 milliGRAM(s) Oral daily  Nephro-celeste 1 Tablet(s) Oral daily  pantoprazole   Suspension 40 milliGRAM(s) Enteral Tube two times a day  QUEtiapine 25 milliGRAM(s) Oral at bedtime  tamsulosin 0.4 milliGRAM(s) Oral at bedtime  trihexyphenidyl 2 milliGRAM(s) Oral three times a day    MEDICATIONS  (PRN):  acetaminophen     Tablet .. 650 milliGRAM(s) Oral every 6 hours PRN Mild Pain (1 - 3)  albuterol/ipratropium for Nebulization 3 milliLiter(s) Nebulizer every 6 hours PRN Shortness of Breath and/or Wheezing  diVALproex Sprinkle 125 milliGRAM(s) Oral every 8 hours PRN Agitation  guaiFENesin Oral Liquid (Sugar-Free) 200 milliGRAM(s) Oral every 6 hours PRN Cough  ondansetron Injectable 4 milliGRAM(s) IV Push once PRN Nausea and/or Vomiting  QUEtiapine 12.5 milliGRAM(s) Oral every 6 hours PRN agitation  sodium chloride 0.9% lock flush 10 milliLiter(s) IV Push every 1 hour PRN Pre/post blood products, medications, blood draw, and to maintain line patency      Care Discussed with Consultants/Other Providers [x] YES  [ ] NO    HEALTH ISSUES - PROBLEM Dx:  Acute heart failure    Atrial flutter    DM2 (diabetes mellitus, type 2)    CAD (coronary artery disease)    Parkinsons disease    Acute on chronic renal failure    NSTEMI (non-ST elevation myocardial infarction)    Hypertension    Acute kidney injury superimposed on CKD    Anemia    Hypothyroidism    Delirium    Advanced care planning/counseling discussion    Palliative care status    Palliative care encounter    Pain    Stage 5 chronic kidney disease not on chronic dialysis    Hypercalcemia    Preop cardiovascular exam

## 2022-02-02 NOTE — PROGRESS NOTE ADULT - ASSESSMENT
71 M w volume overload, GI consulted for drop in hgb    1. CHF per cardio    2. ABBY on CKD per renal  -on HD via permacath per renal, midodrine during dialysis  -s/p L AVF, awaiting maturation   -pending fistulogram; if not successful then shiley or perm cath; care as per nephrology     3. RP bleed  s/p Abdominal Angiography and Embolization on 10/29/2021 by Interventional radiology of l4and l5 lumbar artery     4. Failure to thrive  -s/p PEG 1/10    -maintain abdominal binder  -tolerating TF, continue TF  - aspiration precautions    5. stress duodenal ulcers  -PPI BID    6. Constipation  -resolved  -nonobstructive gas pattern on x-ray 1/13  -continue Miralax BID  -DC planning to rehab underway       [facility]  Gastroenterology and Hepatology  [address] [place], [place]  Office: [phone]  Cell: [phone]

## 2022-02-02 NOTE — PROGRESS NOTE ADULT - SUBJECTIVE AND OBJECTIVE BOX
Chief complaint  Patient is a 71y old  Male who presents with a chief complaint of leg swelling (02 Feb 2022 14:32)   Review of systems  Patient NPO, no hypoglycemic episodes.    Labs and Fingersticks  CAPILLARY BLOOD GLUCOSE      POCT Blood Glucose.: 84 mg/dL (02 Feb 2022 11:49)  POCT Blood Glucose.: 90 mg/dL (02 Feb 2022 07:26)  POCT Blood Glucose.: 147 mg/dL (02 Feb 2022 01:30)  POCT Blood Glucose.: 70 mg/dL (02 Feb 2022 00:26)  POCT Blood Glucose.: 78 mg/dL (01 Feb 2022 22:28)  POCT Blood Glucose.: 94 mg/dL (01 Feb 2022 17:59)      Anion Gap, Serum: 15 (02-02 @ 05:45)      Calcium, Total Serum: 9.4 (02-02 @ 05:45)          02-02    131<L>  |  94<L>  |  94<H>  ----------------------------<  88  5.0   |  22  |  4.64<H>    Ca    9.4      02 Feb 2022 05:45                          8.1    7.45  )-----------( 347      ( 02 Feb 2022 05:46 )             25.4     Medications  MEDICATIONS  (STANDING):  atorvastatin 80 milliGRAM(s) Oral at bedtime  carbidopa/levodopa  25/100 2.5 Tablet(s) Oral <User Schedule>  carbidopa/levodopa  25/100 2 Tablet(s) Oral <User Schedule>  chlorhexidine 4% Liquid 1 Application(s) Topical <User Schedule>  cinacalcet 60 milliGRAM(s) Oral daily  dextrose 40% Gel 15 Gram(s) Oral once  dextrose 5%. 1000 milliLiter(s) (50 mL/Hr) IV Continuous <Continuous>  dextrose 5%. 1000 milliLiter(s) (100 mL/Hr) IV Continuous <Continuous>  dextrose 50% Injectable 25 Gram(s) IV Push once  dextrose 50% Injectable 12.5 Gram(s) IV Push once  dextrose 50% Injectable 25 Gram(s) IV Push once  dextrose 50% Injectable 25 Gram(s) IV Push once  diVALproex Sprinkle 250 milliGRAM(s) Oral three times a day  DULoxetine 20 milliGRAM(s) Oral daily  folic acid 1 milliGRAM(s) Oral daily  glucagon  Injectable 1 milliGRAM(s) IntraMuscular once  insulin glargine Injectable (LANTUS) 6 Unit(s) SubCutaneous at bedtime  insulin lispro (ADMELOG) corrective regimen sliding scale   SubCutaneous every 6 hours  levothyroxine 25 MICROGram(s) Oral daily  memantine 5 milliGRAM(s) Oral at bedtime  metoprolol tartrate 50 milliGRAM(s) Oral two times a day  midodrine 10 milliGRAM(s) Oral <User Schedule>  mirtazapine 7.5 milliGRAM(s) Oral daily  Nephro-celeste 1 Tablet(s) Oral daily  pantoprazole   Suspension 40 milliGRAM(s) Enteral Tube two times a day  QUEtiapine 25 milliGRAM(s) Oral at bedtime  tamsulosin 0.4 milliGRAM(s) Oral at bedtime  trihexyphenidyl 2 milliGRAM(s) Oral three times a day      Physical Exam  General: Patient comfortable in bed  Vital Signs Last 12 Hrs  T(F): 97.1 (02-02-22 @ 11:08), Max: 98.7 (02-02-22 @ 03:42)  HR: 86 (02-02-22 @ 11:08) (86 - 95)  BP: 133/75 (02-02-22 @ 11:08) (133/75 - 144/68)  BP(mean): --  RR: 18 (02-02-22 @ 11:08) (18 - 18)  SpO2: 95% (02-02-22 @ 11:08) (95% - 100%)  Neck: No palpable thyroid nodules.  CVS: S1S2, No murmurs  Respiratory: No wheezing, no crepitations  GI: Abdomen soft, bowel sounds positive  Musculoskeletal:  edema lower extremities.   Skin: No skin rashes, no ecchymosis    Diagnostics             Chief complaint  Patient is a 71y old  Male who presents with a chief complaint of leg swelling (02 Feb 2022 14:32)   Review of systems  Patient NPO, no hypoglycemic episodes.    Labs and Fingersticks  CAPILLARY BLOOD GLUCOSE      POCT Blood Glucose.: 84 mg/dL (02 Feb 2022 11:49)  POCT Blood Glucose.: 90 mg/dL (02 Feb 2022 07:26)  POCT Blood Glucose.: 147 mg/dL (02 Feb 2022 01:30)  POCT Blood Glucose.: 70 mg/dL (02 Feb 2022 00:26)  POCT Blood Glucose.: 78 mg/dL (01 Feb 2022 22:28)  POCT Blood Glucose.: 94 mg/dL (01 Feb 2022 17:59)      Anion Gap, Serum: 15 (02-02 @ 05:45)      Calcium, Total Serum: 9.4 (02-02 @ 05:45)          02-02    131<L>  |  94<L>  |  94<H>  ----------------------------<  88  5.0   |  22  |  4.64<H>    Ca    9.4      02 Feb 2022 05:45                          8.1    7.45  )-----------( 347      ( 02 Feb 2022 05:46 )             25.4     Medications  MEDICATIONS  (STANDING):  atorvastatin 80 milliGRAM(s) Oral at bedtime  carbidopa/levodopa  25/100 2.5 Tablet(s) Oral <User Schedule>  carbidopa/levodopa  25/100 2 Tablet(s) Oral <User Schedule>  chlorhexidine 4% Liquid 1 Application(s) Topical <User Schedule>  cinacalcet 60 milliGRAM(s) Oral daily  dextrose 40% Gel 15 Gram(s) Oral once  dextrose 5%. 1000 milliLiter(s) (50 mL/Hr) IV Continuous <Continuous>  dextrose 5%. 1000 milliLiter(s) (100 mL/Hr) IV Continuous <Continuous>  dextrose 50% Injectable 25 Gram(s) IV Push once  dextrose 50% Injectable 12.5 Gram(s) IV Push once  dextrose 50% Injectable 25 Gram(s) IV Push once  dextrose 50% Injectable 25 Gram(s) IV Push once  diVALproex Sprinkle 250 milliGRAM(s) Oral three times a day  DULoxetine 20 milliGRAM(s) Oral daily  folic acid 1 milliGRAM(s) Oral daily  glucagon  Injectable 1 milliGRAM(s) IntraMuscular once  insulin glargine Injectable (LANTUS) 6 Unit(s) SubCutaneous at bedtime  insulin lispro (ADMELOG) corrective regimen sliding scale   SubCutaneous every 6 hours  levothyroxine 25 MICROGram(s) Oral daily  memantine 5 milliGRAM(s) Oral at bedtime  metoprolol tartrate 50 milliGRAM(s) Oral two times a day  midodrine 10 milliGRAM(s) Oral <User Schedule>  mirtazapine 7.5 milliGRAM(s) Oral daily  Nephro-celeste 1 Tablet(s) Oral daily  pantoprazole   Suspension 40 milliGRAM(s) Enteral Tube two times a day  QUEtiapine 25 milliGRAM(s) Oral at bedtime  tamsulosin 0.4 milliGRAM(s) Oral at bedtime  trihexyphenidyl 2 milliGRAM(s) Oral three times a day      Physical Exam  General: Patient comfortable in bed  Vital Signs Last 12 Hrs  T(F): 97.1 (02-02-22 @ 11:08), Max: 98.7 (02-02-22 @ 03:42)  HR: 86 (02-02-22 @ 11:08) (86 - 95)  BP: 133/75 (02-02-22 @ 11:08) (133/75 - 144/68)  BP(mean): --  RR: 18 (02-02-22 @ 11:08) (18 - 18)  SpO2: 95% (02-02-22 @ 11:08) (95% - 100%)  Neck: No palpable thyroid nodules.  CVS: S1S2, No murmurs  Respiratory: No wheezing, no crepitations  GI: Abdomen soft, bowel sounds positive  Musculoskeletal:  edema lower extremities.   Skin: No skin rashes, no ecchymosis    Diagnostics

## 2022-02-03 NOTE — CHART NOTE - NSCHARTNOTEFT_GEN_A_CORE
AVF nonfunctional during HD. Dr. Frias called and asked for permacath placement by IR. Consult placed    [id number]

## 2022-02-03 NOTE — PROGRESS NOTE ADULT - SUBJECTIVE AND OBJECTIVE BOX
Chief complaint  Patient is a 71y old  Male who presents with a chief complaint of leg swelling (03 Feb 2022 11:51)   Review of systems  Patient in bed, looks comfortable, no hypoglycemic episodes.    Labs and Fingersticks  CAPILLARY BLOOD GLUCOSE      POCT Blood Glucose.: 104 mg/dL (03 Feb 2022 11:56)  POCT Blood Glucose.: 128 mg/dL (03 Feb 2022 06:54)  POCT Blood Glucose.: 99 mg/dL (02 Feb 2022 23:54)  POCT Blood Glucose.: 77 mg/dL (02 Feb 2022 21:36)  POCT Blood Glucose.: 83 mg/dL (02 Feb 2022 18:17)      Anion Gap, Serum: 16 (02-03 @ 05:39)  Anion Gap, Serum: 15 (02-02 @ 05:45)      Calcium, Total Serum: 9.1 (02-03 @ 05:39)  Calcium, Total Serum: 9.4 (02-02 @ 05:45)          02-03    130<L>  |  93<L>  |  97<H>  ----------------------------<  132<H>  4.7   |  21<L>  |  4.89<H>    Ca    9.1      03 Feb 2022 05:39                          8.9    6.34  )-----------( 307      ( 03 Feb 2022 05:41 )             28.6     Medications  MEDICATIONS  (STANDING):  atorvastatin 80 milliGRAM(s) Oral at bedtime  carbidopa/levodopa  25/100 2.5 Tablet(s) Oral <User Schedule>  carbidopa/levodopa  25/100 2 Tablet(s) Oral <User Schedule>  chlorhexidine 4% Liquid 1 Application(s) Topical <User Schedule>  cinacalcet 60 milliGRAM(s) Oral daily  dextrose 40% Gel 15 Gram(s) Oral once  dextrose 5%. 1000 milliLiter(s) (50 mL/Hr) IV Continuous <Continuous>  dextrose 5%. 1000 milliLiter(s) (100 mL/Hr) IV Continuous <Continuous>  dextrose 50% Injectable 25 Gram(s) IV Push once  dextrose 50% Injectable 12.5 Gram(s) IV Push once  dextrose 50% Injectable 25 Gram(s) IV Push once  dextrose 50% Injectable 25 Gram(s) IV Push once  diVALproex Sprinkle 250 milliGRAM(s) Oral three times a day  DULoxetine 20 milliGRAM(s) Oral daily  epoetin mari-epbx (RETACRIT) Injectable 25768 Unit(s) SubCutaneous once  folic acid 1 milliGRAM(s) Oral daily  glucagon  Injectable 1 milliGRAM(s) IntraMuscular once  insulin glargine Injectable (LANTUS) 6 Unit(s) SubCutaneous at bedtime  insulin lispro (ADMELOG) corrective regimen sliding scale   SubCutaneous every 6 hours  levothyroxine 25 MICROGram(s) Oral daily  memantine 5 milliGRAM(s) Oral at bedtime  metoprolol tartrate 50 milliGRAM(s) Oral two times a day  midodrine 10 milliGRAM(s) Oral <User Schedule>  mirtazapine 7.5 milliGRAM(s) Oral daily  Nephro-celeste 1 Tablet(s) Oral daily  pantoprazole   Suspension 40 milliGRAM(s) Enteral Tube two times a day  QUEtiapine 25 milliGRAM(s) Oral at bedtime  tamsulosin 0.4 milliGRAM(s) Oral at bedtime  trihexyphenidyl 2 milliGRAM(s) Oral three times a day      Physical Exam  General: Patient comfortable in bed  Vital Signs Last 12 Hrs  T(F): 97.2 (02-03-22 @ 11:01), Max: 97.2 (02-03-22 @ 11:01)  HR: 90 (02-03-22 @ 11:01) (90 - 90)  BP: 146/73 (02-03-22 @ 11:01) (113/68 - 146/73)  BP(mean): --  RR: 18 (02-03-22 @ 11:01) (18 - 18)  SpO2: 97% (02-03-22 @ 11:01) (93% - 97%)  Neck: No palpable thyroid nodules.  CVS: S1S2, No murmurs  Respiratory: No wheezing, no crepitations  GI: Abdomen soft, bowel sounds positive  Musculoskeletal:  edema lower extremities.   Skin: No skin rashes, no ecchymosis    Diagnostics                 Chief complaint  Patient is a 71y old  Male who presents with a chief complaint of leg swelling (03 Feb 2022 11:51)   Review of systems  Patient in bed, looks comfortable, no hypoglycemic episodes.    Labs and Fingersticks  CAPILLARY BLOOD GLUCOSE      POCT Blood Glucose.: 104 mg/dL (03 Feb 2022 11:56)  POCT Blood Glucose.: 128 mg/dL (03 Feb 2022 06:54)  POCT Blood Glucose.: 99 mg/dL (02 Feb 2022 23:54)  POCT Blood Glucose.: 77 mg/dL (02 Feb 2022 21:36)  POCT Blood Glucose.: 83 mg/dL (02 Feb 2022 18:17)      Anion Gap, Serum: 16 (02-03 @ 05:39)  Anion Gap, Serum: 15 (02-02 @ 05:45)      Calcium, Total Serum: 9.1 (02-03 @ 05:39)  Calcium, Total Serum: 9.4 (02-02 @ 05:45)          02-03    130<L>  |  93<L>  |  97<H>  ----------------------------<  132<H>  4.7   |  21<L>  |  4.89<H>    Ca    9.1      03 Feb 2022 05:39                          8.9    6.34  )-----------( 307      ( 03 Feb 2022 05:41 )             28.6     Medications  MEDICATIONS  (STANDING):  atorvastatin 80 milliGRAM(s) Oral at bedtime  carbidopa/levodopa  25/100 2.5 Tablet(s) Oral <User Schedule>  carbidopa/levodopa  25/100 2 Tablet(s) Oral <User Schedule>  chlorhexidine 4% Liquid 1 Application(s) Topical <User Schedule>  cinacalcet 60 milliGRAM(s) Oral daily  dextrose 40% Gel 15 Gram(s) Oral once  dextrose 5%. 1000 milliLiter(s) (50 mL/Hr) IV Continuous <Continuous>  dextrose 5%. 1000 milliLiter(s) (100 mL/Hr) IV Continuous <Continuous>  dextrose 50% Injectable 25 Gram(s) IV Push once  dextrose 50% Injectable 12.5 Gram(s) IV Push once  dextrose 50% Injectable 25 Gram(s) IV Push once  dextrose 50% Injectable 25 Gram(s) IV Push once  diVALproex Sprinkle 250 milliGRAM(s) Oral three times a day  DULoxetine 20 milliGRAM(s) Oral daily  epoetin mari-epbx (RETACRIT) Injectable 58793 Unit(s) SubCutaneous once  folic acid 1 milliGRAM(s) Oral daily  glucagon  Injectable 1 milliGRAM(s) IntraMuscular once  insulin glargine Injectable (LANTUS) 6 Unit(s) SubCutaneous at bedtime  insulin lispro (ADMELOG) corrective regimen sliding scale   SubCutaneous every 6 hours  levothyroxine 25 MICROGram(s) Oral daily  memantine 5 milliGRAM(s) Oral at bedtime  metoprolol tartrate 50 milliGRAM(s) Oral two times a day  midodrine 10 milliGRAM(s) Oral <User Schedule>  mirtazapine 7.5 milliGRAM(s) Oral daily  Nephro-celeste 1 Tablet(s) Oral daily  pantoprazole   Suspension 40 milliGRAM(s) Enteral Tube two times a day  QUEtiapine 25 milliGRAM(s) Oral at bedtime  tamsulosin 0.4 milliGRAM(s) Oral at bedtime  trihexyphenidyl 2 milliGRAM(s) Oral three times a day      Physical Exam  General: Patient comfortable in bed  Vital Signs Last 12 Hrs  T(F): 97.2 (02-03-22 @ 11:01), Max: 97.2 (02-03-22 @ 11:01)  HR: 90 (02-03-22 @ 11:01) (90 - 90)  BP: 146/73 (02-03-22 @ 11:01) (113/68 - 146/73)  BP(mean): --  RR: 18 (02-03-22 @ 11:01) (18 - 18)  SpO2: 97% (02-03-22 @ 11:01) (93% - 97%)  Neck: No palpable thyroid nodules.  CVS: S1S2, No murmurs  Respiratory: No wheezing, no crepitations  GI: Abdomen soft, bowel sounds positive  Musculoskeletal:  edema lower extremities.   Skin: No skin rashes, no ecchymosis    Diagnostics

## 2022-02-03 NOTE — PROGRESS NOTE ADULT - ASSESSMENT
71 M w volume overload, GI consulted for drop in hgb    1. CHF per cardio    2. ABBY on CKD per renal  -on HD via permacath per renal, midodrine during dialysis  -AVF okay to use as per IR     3. RP bleed  s/p Abdominal Angiography and Embolization on 10/29/2021 by Interventional radiology of l4and l5 lumbar artery     4. Failure to thrive  -s/p PEG 1/10    -maintain abdominal binder  -tolerating TF, continue TF  - aspiration precautions    5. stress duodenal ulcers  -PPI BID    6. Constipation  -resolved  -nonobstructive gas pattern on x-ray 1/13  -continue Miralax BID  -DC planning to rehab underway       [facility], [place]  Gastroenterology and Hepatology  [address] [place], [place]  Office: [phone]  Cell: [phone]

## 2022-02-03 NOTE — PROGRESS NOTE ADULT - SUBJECTIVE AND OBJECTIVE BOX
[facility] [phone]        Patient seen and examined in bed.  He was in good spirits         MEDICATIONS  (STANDING):  atorvastatin 80 milliGRAM(s) Oral at bedtime  carbidopa/levodopa  25/100 2.5 Tablet(s) Oral <User Schedule>  carbidopa/levodopa  25/100 2 Tablet(s) Oral <User Schedule>  chlorhexidine 4% Liquid 1 Application(s) Topical <User Schedule>  cinacalcet 60 milliGRAM(s) Oral daily  dextrose 40% Gel 15 Gram(s) Oral once  dextrose 5%. 1000 milliLiter(s) (50 mL/Hr) IV Continuous <Continuous>  dextrose 5%. 1000 milliLiter(s) (100 mL/Hr) IV Continuous <Continuous>  dextrose 50% Injectable 25 Gram(s) IV Push once  dextrose 50% Injectable 12.5 Gram(s) IV Push once  dextrose 50% Injectable 25 Gram(s) IV Push once  dextrose 50% Injectable 25 Gram(s) IV Push once  diVALproex Sprinkle 250 milliGRAM(s) Oral three times a day  DULoxetine 20 milliGRAM(s) Oral daily  folic acid 1 milliGRAM(s) Oral daily  glucagon  Injectable 1 milliGRAM(s) IntraMuscular once  insulin glargine Injectable (LANTUS) 6 Unit(s) SubCutaneous at bedtime  insulin lispro (ADMELOG) corrective regimen sliding scale   SubCutaneous every 6 hours  levothyroxine 25 MICROGram(s) Oral daily  memantine 5 milliGRAM(s) Oral at bedtime  metoprolol tartrate 50 milliGRAM(s) Oral two times a day  midodrine 10 milliGRAM(s) Oral <User Schedule>  mirtazapine 7.5 milliGRAM(s) Oral daily  Nephro-celeste 1 Tablet(s) Oral daily  pantoprazole   Suspension 40 milliGRAM(s) Enteral Tube two times a day  QUEtiapine 25 milliGRAM(s) Oral at bedtime  tamsulosin 0.4 milliGRAM(s) Oral at bedtime  trihexyphenidyl 2 milliGRAM(s) Oral three times a day      VITAL:  T(C): , Max: 36.8 (02-02-22 @ 16:50)  T(F): , Max: 98.2 (02-02-22 @ 16:50)  HR: 90 (02-03-22 @ 04:35)  BP: 113/68 (02-03-22 @ 04:35)  BP(mean): 96 (02-02-22 @ 17:50)  RR: 18 (02-03-22 @ 04:35)  SpO2: 93% (02-03-22 @ 04:35)  Wt(kg): --    I and O's:    02-02 @ 07:01  -  02-03 @ 07:00  --------------------------------------------------------  IN: 0 mL / OUT: 300 mL / NET: -300 mL      Height (cm): 180.3 (02-02 @ 15:01)  Weight (kg): 96.4 (02-02 @ 15:01)  BMI (kg/m2): 29.7 (02-02 @ 15:01)  BSA (m2): 2.16 (02-02 @ 15:01)    PHYSICAL EXAM:    Constitutional: NAD  Neck:  No JVD  Respiratory: CTAB/L  Cardiovascular: S1 and S2  Gastrointestinal: BS+, soft, NT/ND  Extremities: No peripheral edema  Neurological:    : No Javier  Skin: No rashes  Access: avf    LABS:                        8.9    6.34  )-----------( 307      ( 03 Feb 2022 05:41 )             28.6     02-03    130<L>  |  93<L>  |  97<H>  ----------------------------<  132<H>  4.7   |  21<L>  |  4.89<H>    Ca    9.1      03 Feb 2022 05:39            Urine Studies:          RADIOLOGY & ADDITIONAL STUDIES:

## 2022-02-03 NOTE — PROGRESS NOTE ADULT - ASSESSMENT
71M w/ HTN, DM2, CAD-CABG, Parkinson's disease, and advanced CKD, 10/21/21 p/w LE swelling, c/b COVID; now newly ESRD-HD as of this admission    (1)Renal - ESRD-HD TTS - last dialyzed Thursday  (2)Hyponatremia - higher Na+ bath with dialysis        RECOMMEND  (1)HD today and will use the AVF;  IR work appreciated    (2)Nutritional support       DW ACP       [facility]  [phone]

## 2022-02-03 NOTE — PROGRESS NOTE ADULT - SUBJECTIVE AND OBJECTIVE BOX
Chief Complaint:  Patient is a 71y old  Male who presents with a chief complaint of leg swelling (03 Feb 2022 10:40)      Date of service 02-03-22 @ 11:40      Interval Events:   Patient seen and examined. S/p fistulogram/ angioplasty yesterday. To start use of AVF.    Hospital Medications:  acetaminophen     Tablet .. 650 milliGRAM(s) Oral every 6 hours PRN  albuterol/ipratropium for Nebulization 3 milliLiter(s) Nebulizer every 6 hours PRN  atorvastatin 80 milliGRAM(s) Oral at bedtime  carbidopa/levodopa  25/100 2.5 Tablet(s) Oral <User Schedule>  carbidopa/levodopa  25/100 2 Tablet(s) Oral <User Schedule>  chlorhexidine 4% Liquid 1 Application(s) Topical <User Schedule>  cinacalcet 60 milliGRAM(s) Oral daily  dextrose 40% Gel 15 Gram(s) Oral once  dextrose 5%. 1000 milliLiter(s) IV Continuous <Continuous>  dextrose 5%. 1000 milliLiter(s) IV Continuous <Continuous>  dextrose 50% Injectable 25 Gram(s) IV Push once  dextrose 50% Injectable 12.5 Gram(s) IV Push once  dextrose 50% Injectable 25 Gram(s) IV Push once  dextrose 50% Injectable 25 Gram(s) IV Push once  diVALproex Sprinkle 125 milliGRAM(s) Oral every 8 hours PRN  diVALproex Sprinkle 250 milliGRAM(s) Oral three times a day  DULoxetine 20 milliGRAM(s) Oral daily  epoetin mari-epbx (RETACRIT) Injectable 27377 Unit(s) SubCutaneous once  folic acid 1 milliGRAM(s) Oral daily  glucagon  Injectable 1 milliGRAM(s) IntraMuscular once  guaiFENesin Oral Liquid (Sugar-Free) 200 milliGRAM(s) Oral every 6 hours PRN  insulin glargine Injectable (LANTUS) 6 Unit(s) SubCutaneous at bedtime  insulin lispro (ADMELOG) corrective regimen sliding scale   SubCutaneous every 6 hours  levothyroxine 25 MICROGram(s) Oral daily  memantine 5 milliGRAM(s) Oral at bedtime  metoprolol tartrate 50 milliGRAM(s) Oral two times a day  midodrine 10 milliGRAM(s) Oral <User Schedule>  mirtazapine 7.5 milliGRAM(s) Oral daily  Nephro-celeste 1 Tablet(s) Oral daily  ondansetron Injectable 4 milliGRAM(s) IV Push once PRN  pantoprazole   Suspension 40 milliGRAM(s) Enteral Tube two times a day  QUEtiapine 25 milliGRAM(s) Oral at bedtime  QUEtiapine 12.5 milliGRAM(s) Oral every 6 hours PRN  sodium chloride 0.9% lock flush 10 milliLiter(s) IV Push every 1 hour PRN  tamsulosin 0.4 milliGRAM(s) Oral at bedtime  trihexyphenidyl 2 milliGRAM(s) Oral three times a day        Review of Systems:  unable to obtain    PHYSICAL EXAM:   Vital Signs:  Vital Signs Last 24 Hrs  T(C): 36.2 (03 Feb 2022 11:01), Max: 36.8 (02 Feb 2022 16:50)  T(F): 97.2 (03 Feb 2022 11:01), Max: 98.2 (02 Feb 2022 16:50)  HR: 90 (03 Feb 2022 11:01) (79 - 109)  BP: 146/73 (03 Feb 2022 11:01) (113/68 - 146/73)  BP(mean): 96 (02 Feb 2022 17:50) (87 - 100)  RR: 18 (03 Feb 2022 11:01) (13 - 20)  SpO2: 97% (03 Feb 2022 11:01) (93% - 99%)  Daily Height in cm: 180.34 (02 Feb 2022 15:01)    Daily       PHYSICAL EXAM:     GENERAL:  Appears stated age, well-groomed, well-nourished, no distress  HEENT:  NC/AT,  conjunctivae anicteric, clear and pink,   NECK: supple, trachea midline  CHEST:  Full & symmetric excursion, no increased effort, breath sounds clear  HEART:  Regular rhythm, no JVD  ABDOMEN:  Soft, non-tender, non-distended, normoactive bowel sounds,  no masses , no hepatosplenomegaly  EXTREMITIES:  no cyanosis,clubbing or edema  SKIN:  No rash, erythema, or, ecchymoses, no jaundice  NEURO:  non-focal, no asterixis  RECTAL: Deferred      LABS Personally reviewed by me:                        8.9    6.34  )-----------( 307      ( 03 Feb 2022 05:41 )             28.6     Mean Cell Volume: 106.7 fl (02-03-22 @ 05:41)    02-03    130<L>  |  93<L>  |  97<H>  ----------------------------<  132<H>  4.7   |  21<L>  |  4.89<H>    Ca    9.1      03 Feb 2022 05:39        PT/INR - ( 02 Feb 2022 05:46 )   PT: 12.6 sec;   INR: 1.05 ratio                                     8.9    6.34  )-----------( 307      ( 03 Feb 2022 05:41 )             28.6                         8.1    7.45  )-----------( 347      ( 02 Feb 2022 05:46 )             25.4       Imaging personally reviewed by me:

## 2022-02-03 NOTE — PROGRESS NOTE ADULT - SUBJECTIVE AND OBJECTIVE BOX
SUBJECTIVE / OVERNIGHT EVENTS:    INCOMPLETE NOTE      --------------------------------------------------------------------------------------------  LABS:                        8.9    6.34  )-----------( 307      ( 03 Feb 2022 05:41 )             28.6     02-03    130<L>  |  93<L>  |  97<H>  ----------------------------<  132<H>  4.7   |  21<L>  |  4.89<H>    Ca    9.1      03 Feb 2022 05:39      PT/INR - ( 02 Feb 2022 05:46 )   PT: 12.6 sec;   INR: 1.05 ratio           CAPILLARY BLOOD GLUCOSE      POCT Blood Glucose.: 128 mg/dL (03 Feb 2022 06:54)  POCT Blood Glucose.: 99 mg/dL (02 Feb 2022 23:54)  POCT Blood Glucose.: 77 mg/dL (02 Feb 2022 21:36)  POCT Blood Glucose.: 83 mg/dL (02 Feb 2022 18:17)  POCT Blood Glucose.: 84 mg/dL (02 Feb 2022 11:49)            RADIOLOGY & ADDITIONAL TESTS:    Imaging Personally Reviewed:  [x] YES  [ ] NO    Consultant(s) Notes Reviewed:  [x] YES  [ ] NO    MEDICATIONS  (STANDING):  atorvastatin 80 milliGRAM(s) Oral at bedtime  carbidopa/levodopa  25/100 2.5 Tablet(s) Oral <User Schedule>  carbidopa/levodopa  25/100 2 Tablet(s) Oral <User Schedule>  chlorhexidine 4% Liquid 1 Application(s) Topical <User Schedule>  cinacalcet 60 milliGRAM(s) Oral daily  dextrose 40% Gel 15 Gram(s) Oral once  dextrose 5%. 1000 milliLiter(s) (50 mL/Hr) IV Continuous <Continuous>  dextrose 5%. 1000 milliLiter(s) (100 mL/Hr) IV Continuous <Continuous>  dextrose 50% Injectable 25 Gram(s) IV Push once  dextrose 50% Injectable 12.5 Gram(s) IV Push once  dextrose 50% Injectable 25 Gram(s) IV Push once  dextrose 50% Injectable 25 Gram(s) IV Push once  diVALproex Sprinkle 250 milliGRAM(s) Oral three times a day  DULoxetine 20 milliGRAM(s) Oral daily  folic acid 1 milliGRAM(s) Oral daily  glucagon  Injectable 1 milliGRAM(s) IntraMuscular once  insulin glargine Injectable (LANTUS) 6 Unit(s) SubCutaneous at bedtime  insulin lispro (ADMELOG) corrective regimen sliding scale   SubCutaneous every 6 hours  levothyroxine 25 MICROGram(s) Oral daily  memantine 5 milliGRAM(s) Oral at bedtime  metoprolol tartrate 50 milliGRAM(s) Oral two times a day  midodrine 10 milliGRAM(s) Oral <User Schedule>  mirtazapine 7.5 milliGRAM(s) Oral daily  Nephro-celeste 1 Tablet(s) Oral daily  pantoprazole   Suspension 40 milliGRAM(s) Enteral Tube two times a day  QUEtiapine 25 milliGRAM(s) Oral at bedtime  tamsulosin 0.4 milliGRAM(s) Oral at bedtime  trihexyphenidyl 2 milliGRAM(s) Oral three times a day    MEDICATIONS  (PRN):  acetaminophen     Tablet .. 650 milliGRAM(s) Oral every 6 hours PRN Mild Pain (1 - 3)  albuterol/ipratropium for Nebulization 3 milliLiter(s) Nebulizer every 6 hours PRN Shortness of Breath and/or Wheezing  diVALproex Sprinkle 125 milliGRAM(s) Oral every 8 hours PRN Agitation  guaiFENesin Oral Liquid (Sugar-Free) 200 milliGRAM(s) Oral every 6 hours PRN Cough  ondansetron Injectable 4 milliGRAM(s) IV Push once PRN Nausea and/or Vomiting  QUEtiapine 12.5 milliGRAM(s) Oral every 6 hours PRN agitation  sodium chloride 0.9% lock flush 10 milliLiter(s) IV Push every 1 hour PRN Pre/post blood products, medications, blood draw, and to maintain line patency      Care Discussed with Consultants/Other Providers [x] YES  [ ] NO    Vital Signs Last 24 Hrs  T(C): 36.2 (03 Feb 2022 04:35), Max: 36.8 (02 Feb 2022 16:50)  T(F): 97.1 (03 Feb 2022 04:35), Max: 98.2 (02 Feb 2022 16:50)  HR: 90 (03 Feb 2022 04:35) (79 - 109)  BP: 113/68 (03 Feb 2022 04:35) (113/68 - 133/75)  BP(mean): 96 (02 Feb 2022 17:50) (87 - 100)  RR: 18 (03 Feb 2022 04:35) (13 - 20)  SpO2: 93% (03 Feb 2022 04:35) (93% - 99%)  I&O's Summary    02 Feb 2022 07:01  -  03 Feb 2022 07:00  --------------------------------------------------------  IN: 0 mL / OUT: 300 mL / NET: -300 mL    PE:  GENERAL: NAD, AAOx3  HEAD:  Atraumatic, Normocephalic  EYES: EOMI, PERRLA, conjunctiva and sclera clear  NECK: Supple, No JVD  CHEST/LUNG: CTABL, No wheeze  HEART: Regular rate and rhythm; + murmur  ABDOMEN: Soft, Nontender, Nondistended; Bowel sounds present  EXTREMITIES:  2+ Peripheral Pulses, No clubbing, cyanosis, or edema  SKIN: No rashes or lesions  NEURO: No focal deficits       Patient is a 71y old  Male who presents with a chief complaint of leg swelling (03 Feb 2022 10:09)      SUBJECTIVE / OVERNIGHT EVENTS:    INCOMPLETE NOTE      Vital Signs Last 24 Hrs  T(C): 36.2 (03 Feb 2022 04:35), Max: 36.8 (02 Feb 2022 16:50)  T(F): 97.1 (03 Feb 2022 04:35), Max: 98.2 (02 Feb 2022 16:50)  HR: 90 (03 Feb 2022 04:35) (79 - 109)  BP: 113/68 (03 Feb 2022 04:35) (113/68 - 133/75)  BP(mean): 96 (02 Feb 2022 17:50) (87 - 100)  RR: 18 (03 Feb 2022 04:35) (13 - 20)  SpO2: 93% (03 Feb 2022 04:35) (93% - 99%)  I&O's Summary    02 Feb 2022 07:01  -  03 Feb 2022 07:00  --------------------------------------------------------  IN: 0 mL / OUT: 300 mL / NET: -300 mL      PE:  GENERAL: NAD, AAOx3  HEAD:  Atraumatic, Normocephalic  EYES: EOMI, PERRLA, conjunctiva and sclera clear  NECK: Supple, No JVD  CHEST/LUNG: CTABL, No wheeze  HEART: Regular rate and rhythm; + murmur  ABDOMEN: Soft, Nontender, Nondistended; Bowel sounds present  EXTREMITIES:  2+ Peripheral Pulses, No clubbing, cyanosis, or edema  SKIN: No rashes or lesions  NEURO: No focal deficits    LABS:                        8.9    6.34  )-----------( 307      ( 03 Feb 2022 05:41 )             28.6     02-03    130<L>  |  93<L>  |  97<H>  ----------------------------<  132<H>  4.7   |  21<L>  |  4.89<H>    Ca    9.1      03 Feb 2022 05:39      PT/INR - ( 02 Feb 2022 05:46 )   PT: 12.6 sec;   INR: 1.05 ratio           CAPILLARY BLOOD GLUCOSE      POCT Blood Glucose.: 128 mg/dL (03 Feb 2022 06:54)  POCT Blood Glucose.: 99 mg/dL (02 Feb 2022 23:54)  POCT Blood Glucose.: 77 mg/dL (02 Feb 2022 21:36)  POCT Blood Glucose.: 83 mg/dL (02 Feb 2022 18:17)  POCT Blood Glucose.: 84 mg/dL (02 Feb 2022 11:49)            RADIOLOGY & ADDITIONAL TESTS:    Imaging Personally Reviewed:  [x] YES  [ ] NO    Consultant(s) Notes Reviewed:  [x] YES  [ ] NO    MEDICATIONS  (STANDING):  atorvastatin 80 milliGRAM(s) Oral at bedtime  carbidopa/levodopa  25/100 2.5 Tablet(s) Oral <User Schedule>  carbidopa/levodopa  25/100 2 Tablet(s) Oral <User Schedule>  chlorhexidine 4% Liquid 1 Application(s) Topical <User Schedule>  cinacalcet 60 milliGRAM(s) Oral daily  dextrose 40% Gel 15 Gram(s) Oral once  dextrose 5%. 1000 milliLiter(s) (50 mL/Hr) IV Continuous <Continuous>  dextrose 5%. 1000 milliLiter(s) (100 mL/Hr) IV Continuous <Continuous>  dextrose 50% Injectable 25 Gram(s) IV Push once  dextrose 50% Injectable 12.5 Gram(s) IV Push once  dextrose 50% Injectable 25 Gram(s) IV Push once  dextrose 50% Injectable 25 Gram(s) IV Push once  diVALproex Sprinkle 250 milliGRAM(s) Oral three times a day  DULoxetine 20 milliGRAM(s) Oral daily  folic acid 1 milliGRAM(s) Oral daily  glucagon  Injectable 1 milliGRAM(s) IntraMuscular once  insulin glargine Injectable (LANTUS) 6 Unit(s) SubCutaneous at bedtime  insulin lispro (ADMELOG) corrective regimen sliding scale   SubCutaneous every 6 hours  levothyroxine 25 MICROGram(s) Oral daily  memantine 5 milliGRAM(s) Oral at bedtime  metoprolol tartrate 50 milliGRAM(s) Oral two times a day  midodrine 10 milliGRAM(s) Oral <User Schedule>  mirtazapine 7.5 milliGRAM(s) Oral daily  Nephro-celeste 1 Tablet(s) Oral daily  pantoprazole   Suspension 40 milliGRAM(s) Enteral Tube two times a day  QUEtiapine 25 milliGRAM(s) Oral at bedtime  tamsulosin 0.4 milliGRAM(s) Oral at bedtime  trihexyphenidyl 2 milliGRAM(s) Oral three times a day    MEDICATIONS  (PRN):  acetaminophen     Tablet .. 650 milliGRAM(s) Oral every 6 hours PRN Mild Pain (1 - 3)  albuterol/ipratropium for Nebulization 3 milliLiter(s) Nebulizer every 6 hours PRN Shortness of Breath and/or Wheezing  diVALproex Sprinkle 125 milliGRAM(s) Oral every 8 hours PRN Agitation  guaiFENesin Oral Liquid (Sugar-Free) 200 milliGRAM(s) Oral every 6 hours PRN Cough  ondansetron Injectable 4 milliGRAM(s) IV Push once PRN Nausea and/or Vomiting  QUEtiapine 12.5 milliGRAM(s) Oral every 6 hours PRN agitation  sodium chloride 0.9% lock flush 10 milliLiter(s) IV Push every 1 hour PRN Pre/post blood products, medications, blood draw, and to maintain line patency      Care Discussed with Consultants/Other Providers [x] YES  [ ] NO    HEALTH ISSUES - PROBLEM Dx:  Acute heart failure    Atrial flutter    DM2 (diabetes mellitus, type 2)    CAD (coronary artery disease)    Parkinsons disease    Acute on chronic renal failure    NSTEMI (non-ST elevation myocardial infarction)    Hypertension    Acute kidney injury superimposed on CKD    Anemia    Hypothyroidism    Delirium    Advanced care planning/counseling discussion    Palliative care status    Palliative care encounter    Pain    Stage 5 chronic kidney disease not on chronic dialysis    Hypercalcemia    Preop cardiovascular exam         Patient is a 71y old  Male who presents with a chief complaint of leg swelling (03 Feb 2022 10:09)      SUBJECTIVE / OVERNIGHT EVENTS:    no complaints. awake not oriented. calm. cannot provided ros.      Vital Signs Last 24 Hrs  T(C): 36.2 (03 Feb 2022 04:35), Max: 36.8 (02 Feb 2022 16:50)  T(F): 97.1 (03 Feb 2022 04:35), Max: 98.2 (02 Feb 2022 16:50)  HR: 90 (03 Feb 2022 04:35) (79 - 109)  BP: 113/68 (03 Feb 2022 04:35) (113/68 - 133/75)  BP(mean): 96 (02 Feb 2022 17:50) (87 - 100)  RR: 18 (03 Feb 2022 04:35) (13 - 20)  SpO2: 93% (03 Feb 2022 04:35) (93% - 99%)  I&O's Summary    02 Feb 2022 07:01  -  03 Feb 2022 07:00  --------------------------------------------------------  IN: 0 mL / OUT: 300 mL / NET: -300 mL      PE:  GENERAL: NAD, AAOx1  HEAD:  Atraumatic, Normocephalic  EYES: conjunctiva and sclera clear  NECK: No JVD  CHEST/LUNG: CTABL, No wheeze  HEART: Regular rate and rhythm;no murmur  ABDOMEN: Soft, Nontender, Nondistended; Bowel sounds present  EXTREMITIES:  2+ Peripheral Pulses, left avf CDI  SKIN: No rashes or lesions    LABS:                        8.9    6.34  )-----------( 307      ( 03 Feb 2022 05:41 )             28.6     02-03    130<L>  |  93<L>  |  97<H>  ----------------------------<  132<H>  4.7   |  21<L>  |  4.89<H>    Ca    9.1      03 Feb 2022 05:39      PT/INR - ( 02 Feb 2022 05:46 )   PT: 12.6 sec;   INR: 1.05 ratio           CAPILLARY BLOOD GLUCOSE      POCT Blood Glucose.: 128 mg/dL (03 Feb 2022 06:54)  POCT Blood Glucose.: 99 mg/dL (02 Feb 2022 23:54)  POCT Blood Glucose.: 77 mg/dL (02 Feb 2022 21:36)  POCT Blood Glucose.: 83 mg/dL (02 Feb 2022 18:17)  POCT Blood Glucose.: 84 mg/dL (02 Feb 2022 11:49)            RADIOLOGY & ADDITIONAL TESTS:    Imaging Personally Reviewed:  [x] YES  [ ] NO    Consultant(s) Notes Reviewed:  [x] YES  [ ] NO    MEDICATIONS  (STANDING):  atorvastatin 80 milliGRAM(s) Oral at bedtime  carbidopa/levodopa  25/100 2.5 Tablet(s) Oral <User Schedule>  carbidopa/levodopa  25/100 2 Tablet(s) Oral <User Schedule>  chlorhexidine 4% Liquid 1 Application(s) Topical <User Schedule>  cinacalcet 60 milliGRAM(s) Oral daily  dextrose 40% Gel 15 Gram(s) Oral once  dextrose 5%. 1000 milliLiter(s) (50 mL/Hr) IV Continuous <Continuous>  dextrose 5%. 1000 milliLiter(s) (100 mL/Hr) IV Continuous <Continuous>  dextrose 50% Injectable 25 Gram(s) IV Push once  dextrose 50% Injectable 12.5 Gram(s) IV Push once  dextrose 50% Injectable 25 Gram(s) IV Push once  dextrose 50% Injectable 25 Gram(s) IV Push once  diVALproex Sprinkle 250 milliGRAM(s) Oral three times a day  DULoxetine 20 milliGRAM(s) Oral daily  folic acid 1 milliGRAM(s) Oral daily  glucagon  Injectable 1 milliGRAM(s) IntraMuscular once  insulin glargine Injectable (LANTUS) 6 Unit(s) SubCutaneous at bedtime  insulin lispro (ADMELOG) corrective regimen sliding scale   SubCutaneous every 6 hours  levothyroxine 25 MICROGram(s) Oral daily  memantine 5 milliGRAM(s) Oral at bedtime  metoprolol tartrate 50 milliGRAM(s) Oral two times a day  midodrine 10 milliGRAM(s) Oral <User Schedule>  mirtazapine 7.5 milliGRAM(s) Oral daily  Nephro-celeste 1 Tablet(s) Oral daily  pantoprazole   Suspension 40 milliGRAM(s) Enteral Tube two times a day  QUEtiapine 25 milliGRAM(s) Oral at bedtime  tamsulosin 0.4 milliGRAM(s) Oral at bedtime  trihexyphenidyl 2 milliGRAM(s) Oral three times a day    MEDICATIONS  (PRN):  acetaminophen     Tablet .. 650 milliGRAM(s) Oral every 6 hours PRN Mild Pain (1 - 3)  albuterol/ipratropium for Nebulization 3 milliLiter(s) Nebulizer every 6 hours PRN Shortness of Breath and/or Wheezing  diVALproex Sprinkle 125 milliGRAM(s) Oral every 8 hours PRN Agitation  guaiFENesin Oral Liquid (Sugar-Free) 200 milliGRAM(s) Oral every 6 hours PRN Cough  ondansetron Injectable 4 milliGRAM(s) IV Push once PRN Nausea and/or Vomiting  QUEtiapine 12.5 milliGRAM(s) Oral every 6 hours PRN agitation  sodium chloride 0.9% lock flush 10 milliLiter(s) IV Push every 1 hour PRN Pre/post blood products, medications, blood draw, and to maintain line patency      Care Discussed with Consultants/Other Providers [x] YES  [ ] NO    HEALTH ISSUES - PROBLEM Dx:  Acute heart failure    Atrial flutter    DM2 (diabetes mellitus, type 2)    CAD (coronary artery disease)    Parkinsons disease    Acute on chronic renal failure    NSTEMI (non-ST elevation myocardial infarction)    Hypertension    Acute kidney injury superimposed on CKD    Anemia    Hypothyroidism    Delirium    Advanced care planning/counseling discussion    Palliative care status    Palliative care encounter    Pain    Stage 5 chronic kidney disease not on chronic dialysis    Hypercalcemia    Preop cardiovascular exam

## 2022-02-03 NOTE — PROGRESS NOTE ADULT - SUBJECTIVE AND OBJECTIVE BOX
Interventional Radiology Follow-Up Note.     Patient seen and examined @ bedside around 8am.    This is a 71y Male s/p Fistulogram and angioplasty on _________ in Interventional Radiology with  _________.     No complaint offered.      Medication:     metoprolol tartrate: (02-03)  midodrine: (02-03)  tamsulosin: (02-02)    Vitals:   T(F): 97.2, Max: 98.2 (16:50)  HR: 90  BP: 146/73  RR: 18  SpO2: 97%    Physical Exam:  General: Nontoxic, in NAD.  Abdomen: soft, NTND.   Extremities:  ____ groin clean, dry and intact, soft with no evidence of hematoma, ___femoral pulse. B/l dp/pt pulses +___. No pedal edema or calf tenderness noted.  Drain Device: Drain intact attached to ____.  Flushed with 5cc NS w/o difficulty. Dressing clean, dry, intact.     24hr Drain output: ____      02-02-22 @ 07:01  -  02-03-22 @ 07:00  --------------------------------------------------------  IN: 0 mL / OUT: 300 mL / NET: -300 mL              LABS:  Na: 130 (02-03 @ 05:39), 131 (02-02 @ 05:45)  K: 4.7 (02-03 @ 05:39), 5.0 (02-02 @ 05:45)  Cl: 93 (02-03 @ 05:39), 94 (02-02 @ 05:45)  CO2: 21 (02-03 @ 05:39), 22 (02-02 @ 05:45)  BUN: 97 (02-03 @ 05:39), 94 (02-02 @ 05:45)  Cr: 4.89 (02-03 @ 05:39), 4.64 (02-02 @ 05:45)  Glu: 132(02-03 @ 05:39), 88(02-02 @ 05:45)    Hgb: 8.9 (02-03 @ 05:41), 8.1 (02-02 @ 05:46)  Hct: 28.6 (02-03 @ 05:41), 25.4 (02-02 @ 05:46)  WBC: 6.34 (02-03 @ 05:41), 7.45 (02-02 @ 05:46)  Plt: 307 (02-03 @ 05:41), 347 (02-02 @ 05:46)    INR: 1.05 02-02-22 @ 05:46  PTT:                      Assessment/Plan:  71y Male admitted with Acute on chronic systolic congestive heart failure    Pt most recently s/p ___________.      - Flush drain as ordered; DO NOT aspirate  - Change dressing q3 days or when dressing is saturated.  -  Monitor h/h; transfuse as needed  - Trend vs/labs  - Continue global management per primary team  - If the patient is d/c home with drainage catheter, pt can make an appointment with IR by calling the IR booking office at [phone]; recommend IR follow in _____wks/months or when the out put is less than 10cc/24hrs period for tube evaluation.  - Pt will benefit from VNS service to help with drainage catheter care; Pt should continue same drainage catheter care as an outpatient.   - D/w     Please call IR at  [phone] with any questions, concerns, or issues regarding above.    Also available on Teams.   Interventional Radiology Follow-Up Note.    Patient seen and examined @ bedside around 8am.    This is a 71y Male s/p Fistulogram and angioplasty on 2/2/2022 in Interventional Radiology with Dr. Canales.     No complaint offered. Planned for HD today    Medication:  metoprolol tartrate: (02-03)  midodrine: (02-03)  tamsulosin: (02-02)    Vitals:  T(F): 97.2, Max: 98.2 (16:50)  HR: 90  BP: 146/73  RR: 18  SpO2: 97%    Physical Exam:  General: Nontoxic, in NAD, lethargic. A&O to person and year.   LUE: AVF with thrill. Dressing c/d/i, now removed.       LABS:  Na: 130 (02-03 @ 05:39), 131 (02-02 @ 05:45)  K: 4.7 (02-03 @ 05:39), 5.0 (02-02 @ 05:45)  Cl: 93 (02-03 @ 05:39), 94 (02-02 @ 05:45)  CO2: 21 (02-03 @ 05:39), 22 (02-02 @ 05:45)  BUN: 97 (02-03 @ 05:39), 94 (02-02 @ 05:45)  Cr: 4.89 (02-03 @ 05:39), 4.64 (02-02 @ 05:45)  Glu: 132(02-03 @ 05:39), 88(02-02 @ 05:45)    Hgb: 8.9 (02-03 @ 05:41), 8.1 (02-02 @ 05:46)  Hct: 28.6 (02-03 @ 05:41), 25.4 (02-02 @ 05:46)  WBC: 6.34 (02-03 @ 05:41), 7.45 (02-02 @ 05:46)  Plt: 307 (02-03 @ 05:41), 347 (02-02 @ 05:46)    INR: 1.05 02-02-22 @ 05:46  PTT:       Assessment/Plan:  71y Male with ESRD and malfunctioning AVF now s/p Fistulogram and angioplasty on 2/2/2022 in Interventional Radiology with Dr. Canales.      -Planned for HD today via AVF  -AVF w/ +thrill  -Continue HD via AVF  -Global care per primary team  -IR referral appreciated. Will sign off. PLease reconsult as needed.   -D/w Dr. Canales    Please call IR at  [phone] with any questions, concerns, or issues regarding above.    Also available on Teams.

## 2022-02-03 NOTE — PROGRESS NOTE ADULT - ASSESSMENT
Assessment  DMT2: 71y Male with DM T2 with hyperglycemia, A1C 6.4%, was on insulin at home, now on basal insulin and coverage, Lantus dose held, blood sugars in acceptable range, no hypoglycemias.  Patient with AVF malfunction, now s/p revision, NAD.  Hypothyroidism: Patient has no history thyroid disease, was not on any thyroid supplements, subclinical with low-normal FT4, lethargic, on synthroid 25 mcg po daily, repeat TFTs improved and euthyroid.  Hypercalcemia: Started on Sensipar 60mg daily, Ca improving.  CHF: on medications, stable, monitored.  HTN: Controlled,  on antihypertensive medications.  Parkinsons: on meds, monitored.  CKD: Monitor labs/BMP      Dr Ayers  Cell : [phone]   Assessment  DMT2: 71y Male with DM T2 with hyperglycemia, A1C 6.4%, was on insulin at home, now on basal insulin and coverage, Lantus dose held, blood sugars in acceptable range,  no hypoglycemias.  Patient with AVF malfunction, now s/p revision, NAD.  Hypothyroidism: Patient has no history thyroid disease, was not on any thyroid supplements, subclinical with low-normal FT4, lethargic, on synthroid 25 mcg po daily, repeat TFTs improved and euthyroid.  Hypercalcemia: Started on Sensipar 60mg daily, Ca improving.  CHF: on medications, stable, monitored.  HTN: Controlled,  on antihypertensive medications.  Parkinsons: on meds, monitored.  CKD: Monitor labs/BMP      Dr Ayers  Cell : [phone]

## 2022-02-03 NOTE — PROGRESS NOTE ADULT - ASSESSMENT
72yo M w/ PMHx of CAD (s/p CABG 2019), CKD (unknown stage), DM2, Parkinson's Disease, HTN, depression presents with new onset acute heart failure exacerbation, NSTEMI, started on hep gtt, developed bl RP bleed, acute anemia. sp MICU course for hemorrhagic shock, 10/28 sp IR embolization l4 and l5 lumbar artery. sp permacath and AVF.    # chronic systolic and diastolic heart failure  # ESRD, new HD  # Atrial flutter  # Parkinson's disease   # delirium  # CAD (coronary artery disease)  # HTN  # DM2 (diabetes mellitus, type 2)  # FTT sp PEG  midodrine during dialysis  sp L AVF using for HD, permacath dced  1/29 malfunctioning Lt AVF - IR eval noted - for fistulogram  holding Seroquel for lethargy  ID following, no indication for abx  insulin per endo  lopressor, atorvastatin  peg tube for tube feeds, abd binder  flomax    DVT ppx hsq    DNR/DNI    [facility]  [phone] 72yo M w/ PMHx of CAD (s/p CABG 2019), CKD (unknown stage), DM2, Parkinson's Disease, HTN, depression presents with new onset acute heart failure exacerbation, NSTEMI, started on hep gtt, developed bl RP bleed, acute anemia. sp MICU course for hemorrhagic shock, 10/28 sp IR embolization l4 and l5 lumbar artery. sp permacath and AVF.    # chronic systolic and diastolic heart failure  # ESRD, new HD  # Atrial flutter  # Parkinson's disease   # delirium  # CAD (coronary artery disease)  # HTN  # DM2 (diabetes mellitus, type 2)  # FTT sp PEG  midodrine during dialysis  sp L AVF using for HD, permacath dced  1/29 malfunctioning Lt AVF - 2/2 sp fistulogram and angioplasty by IR  ID following, no indication for abx  insulin per endo  lopressor, atorvastatin  peg tube for tube feeds, abd binder  flomax    DVT ppx hsq    DNR/DNI    [facility]  [phone]

## 2022-02-03 NOTE — CHART NOTE - NSCHARTNOTEFT_GEN_A_CORE
Nutrition Follow Up Note  Patient seen for:    Chart reviewed, events noted.   70yo M w/ PMHx of CAD (s/p CABG ), CKD (unknown stage), DM2, Parkinson's Disease, HTN, depression presents with new onset acute heart failure exacerbation, NSTEMI, started on hep gtt, developed bl RP bleed, acute anemia. sp MICU course for hemorrhagic shock, 10/28 sp IR embolization l4 and l5 lumbar artery. sp permacath and AVF.    Source: [] Patient       [x] Medical Record        [x] RN        [] Family at bedside       [] Other:    -If unable to interview patient: [] Trach/Vent/BiPAP  [x] Disoriented/confused/inappropriate to interview    Diet Order:   Diet, NPO:   NPO for Procedure/Test     NPO Start Date: 2022,   NPO Start Time: 15:05 (22)  Diet, NPO with Tube Feed:   Tube Feeding Modality: Gastrostomy  Nepro with Carb Steady (NEPRORTH)  Total Volume for 24 Hours (mL): 1980  Bolus  Total Volume of Bolus (mL):  330  Total # of Feeds: 4  Tube Feed Frequency: Every 4 hours   Tube Feed Start Time: 16:00  Bolus Feed Rate (mL per Hour): 330   Bolus Feed Duration (in Hours): 1  Bolus Feed Instructions:  Nepro start @ 120 ml bolus, increase 120 ml per bolus as tolerated to GOAL bolus 330 ml 4x daily (08:00, 12:00, 16:00, 20:00).  Free Water Flush Instructions:  defer to team (22)    - Is current order appropriate/adequate? [x] Yes      -     - Nutrition-related concerns:      - Hypophosphatemia  and  --> WNL  and          - vitamin D d/c'd       - RRT called overnight , pt disoriented, lethargic.      - s/p PEG 1/10,      - Per chart, pt ordered for SSI admelog, sinemet, Synthroid (IV), remeron, Nephro-celeste, folic acid, and Vitamin D3      - Per chart, Sinemet is ordered for 06:00, 14:00, & 22:00.    GI:  Last BM    Bowel Regimen? No  : bowel regimen d/c'd due to loose stool       Weights:   Daily Weight in k.4 (), Weight in k.5 (), Weight in k () Daily Weight in k.8 (), Weight in k.8 (), Weight in k.8 (), Weight in k.8 (), Weight in k.6 (), Weight in k.3 (), Weight in k.3 (), 67.6 (-16), 64.1 (), 64.1 (), 61.5 (01-10), 66.6 (), 67.1 (), 69.3 (),  69.8 (), 68.6 (),  68.6 (),  70.4 (),  71 (),  71 ().  Dosing wt: 96.4 kg ()  Suspect wt changes due to HD fluid shifts. Noted with progressive wt decline, likely 2/2 poor PO intake and prolonged hospital course. In recent days, the weights have been relatively stable. RD to continue to monitor weight trends    Nutritionally Pertinent MEDICATIONS  (STANDING):  atorvastatin  cinacalcet  dextrose 40% Gel  dextrose 5%.  dextrose 5%.  dextrose 50% Injectable  dextrose 50% Injectable  dextrose 50% Injectable  dextrose 50% Injectable  folic acid  glucagon  Injectable  insulin glargine Injectable (LANTUS)  insulin lispro (ADMELOG) corrective regimen sliding scale  levothyroxine  metoprolol tartrate  midodrine  Nephro-celeste  pantoprazole   Suspension  tamsulosin    Pertinent Labs:  @ 05:39: Na 130<L>, BUN 97<H>, Cr 4.89<H>, <H>, K+ 4.7, Phos --, Mg --, Alk Phos --, ALT/SGPT --, AST/SGOT --, HbA1c --    A1C with Estimated Average Glucose Result: 5.8 % (22 @ 09:27)  A1C with Estimated Average Glucose Result: 6.4 % (10-22-21 @ 08:56)    Finger Sticks:  POCT Blood Glucose.: 128 mg/dL ( @ 06:54)  POCT Blood Glucose.: 99 mg/dL ( @ 23:54)  POCT Blood Glucose.: 77 mg/dL ( @ 21:36)  POCT Blood Glucose.: 83 mg/dL ( @ 18:17)  POCT Blood Glucose.: 84 mg/dL ( @ 11:49)      Skin per nursing documentation:   Edema:     Estimated Needs:   [] no change since previous assessment  [] recalculated:     Previous Nutrition Diagnosis:   Nutrition Diagnosis is: [] ongoing  [] resolved [] not applicable     New Nutrition Diagnosis: [] Not applicable    Nutrition Care Plan:  [] In Progress  [] Achieved  [] Not applicable    Nutrition Interventions:     Education Provided:       [] Yes:  [] No:        Recommendations:         [] Continue current diet order            [] Add oral nutrition supplement:     [] Discontinue current diet order. Recommend:      [] Add micronutrient supplementation:      [] Continue current micronutrient supplementation:      [] Other:     Monitoring and Evaluation:   Continue to monitor nutritional intake, tolerance to diet prescription, weights, labs, skin integrity      RD remains available upon request and will follow up per protocol Nutrition Follow Up Note  Patient seen for: malnutrition follow-up    Chart reviewed, events noted.   70yo M w/ PMHx of CAD (s/p CABG ), CKD (unknown stage), DM2, Parkinson's Disease, HTN, depression presents with new onset acute heart failure exacerbation, NSTEMI, started on hep gtt, developed bl RP bleed, acute anemia. sp MICU course for hemorrhagic shock, 10/28 sp IR embolization l4 and l5 lumbar artery. sp permacath and AVF.    Source: [] Patient       [x] Medical Record        [x] RN        [] Family at bedside       [] Other:    -If unable to interview patient: [] Trach/Vent/BiPAP  [x] Disoriented/confused/inappropriate to interview    Diet Order:   Diet, NPO:   NPO for Procedure/Test     NPO Start Date: 2022,   NPO Start Time: 15:05 (22)  Diet, NPO with Tube Feed:   Tube Feeding Modality: Gastrostomy  Nepro with Carb Steady (NEPRORTH)  Total Volume for 24 Hours (mL): 1980  Bolus  Total Volume of Bolus (mL):  330  Total # of Feeds: 4  Tube Feed Frequency: Every 4 hours   Tube Feed Start Time: 16:00  Bolus Feed Rate (mL per Hour): 330   Bolus Feed Duration (in Hours): 1  Bolus Feed Instructions:  Nepro start @ 120 ml bolus, increase 120 ml per bolus as tolerated to GOAL bolus 330 ml 4x daily (08:00, 12:00, 16:00, 20:00).  Free Water Flush Instructions:  defer to team (22)    - Is current order appropriate/adequate? [x] Yes      -     - Nutrition-related concerns:      - Hypophosphatemia  and  --> WNL  and          - vitamin D d/c'd       - RRT called overnight , pt disoriented, lethargic.      - s/p PEG 1/10,      - Per chart, pt ordered for SSI admelog, sinemet, Synthroid (PO), remeron, Nephro-celeste, folic acid      - Per chart, Sinemet is ordered for 06:00, 14:00, & 22:00.    GI:  Last BM  2/3  Bowel Regimen? No  : bowel regimen d/c'd due to loose stool       Weights:   Daily Weight in k.4 (), Weight in k.5 (), Weight in k () Daily Weight in k.8 (), Weight in k.8 (), Weight in k.8 (), Weight in k.8 (), Weight in k.6 (), Weight in k.3 (), Weight in k.3 (), 67.6 (-16), 64.1 (), 64.1 (), 61.5 (01-10), 66.6 (), 67.1 (), 69.3 (),  69.8 (), 68.6 (),  68.6 (),  70.4 (),  71 (),  71 ().  Dosing wt: 96.4 kg ()  Suspect wt changes due to HD fluid shifts. Noted with progressive wt decline, likely 2/2 poor PO intake and prolonged hospital course. In recent days, the weights have been relatively stable. RD to continue to monitor weight trends    Nutritionally Pertinent MEDICATIONS  (STANDING):  atorvastatin  cinacalcet  dextrose 40% Gel  dextrose 5%.  dextrose 5%.  dextrose 50% Injectable  dextrose 50% Injectable  dextrose 50% Injectable  dextrose 50% Injectable  folic acid  glucagon  Injectable  insulin glargine Injectable (LANTUS)  insulin lispro (ADMELOG) corrective regimen sliding scale  levothyroxine  metoprolol tartrate  midodrine  Nephro-celeste  pantoprazole   Suspension  tamsulosin    Pertinent Labs:  @ 05:39: Na 130<L>, BUN 97<H>, Cr 4.89<H>, <H>, K+ 4.7, Phos --, Mg --, Alk Phos --, ALT/SGPT --, AST/SGOT --, HbA1c --    A1C with Estimated Average Glucose Result: 5.8 % (22 @ 09:27)  A1C with Estimated Average Glucose Result: 6.4 % (10-22-21 @ 08:56)    Finger Sticks:  POCT Blood Glucose.: 128 mg/dL ( @ 06:54)  POCT Blood Glucose.: 99 mg/dL ( @ 23:54)  POCT Blood Glucose.: 77 mg/dL ( @ 21:36)  POCT Blood Glucose.: 83 mg/dL ( @ 18:17)  POCT Blood Glucose.: 84 mg/dL ( @ 11:49)      Skin per nursing documentation:  no pressure injuries   Edema: no edema    Estimated Needs:   [x] no change since previous assessment  3589-9025 kcal/day (30-35 kcal/kg)   gm protein/day (1.2-1.4 g/kg)  Defer fluid needs to team  based on IBW 78 kg    Previous Nutrition Diagnosis: Increased Nutrient Needs; Food and Nutrition Related Knowledge Deficit;  Severe acute on chronic malnutrition    Nutrition Diagnosis is: [x] ongoing  [] resolved [] not applicable   Being addressed with tube feeding regimen via PEG and micronutrient supplementation    Nutrition Care Plan:  [x] In Progress  [] Achieved  [] Not applicable    New Nutrition Diagnosis: [x] Not applicable    Nutrition Interventions:     Education Provided   [x] No: Not appropriate at this time.    Recommendations:      1) Recommend continue Nepro bolus @ 330 ml 4x daily (08:00, 12:00, 16:00, 20:00) with consideration for Sinemet administration. Defer free water flushes to team.     -Total regimen to provide 1320 ml total volume, 2347 kcal, 107 g pro, 960 ml free water.     -Provides 30 kcal/kg, 1.4 g pro/kg based on IBW 78 considering wt fluctuations. Meets >100% RDIs.  2) Please check phos.  3) Continue Nephro-celeste and Folic acid if no medical contraindications as medically appropriate.  4) Monitor EN tolerance, skin integrity, labs, weight.  5) RD remains available to adjust EN Regimen as needed.    Monitoring and Evaluation:   Continue to monitor nutritional intake, tolerance to diet prescription, weights, labs, skin integrity    RD remains available upon request and will follow up per protocol  Jada Hou RD, CDN. Pager: [phone]. Nutrition Follow Up Note  Patient seen for: malnutrition follow-up    Chart reviewed, events noted.   70yo M w/ PMHx of CAD (s/p CABG ), CKD (unknown stage), DM2, Parkinson's Disease, HTN, depression presents with new onset acute heart failure exacerbation, NSTEMI, started on hep gtt, developed bl RP bleed, acute anemia. sp MICU course for hemorrhagic shock, 10/28 sp IR embolization l4 and l5 lumbar artery. sp permacath and AVF.     Source: [] Patient       [x] Medical Record        [x] RN        [] Family at bedside       [] Other:    -If unable to interview patient: [] Trach/Vent/BiPAP  [x] Disoriented/confused/inappropriate to interview    Diet Order:   Diet, NPO:   NPO for Procedure/Test     NPO Start Date: 2022,   NPO Start Time: 15:05 (22)  Diet, NPO with Tube Feed:   Tube Feeding Modality: Gastrostomy  Nepro with Carb Steady (NEPRORTH)  Total Volume for 24 Hours (mL): 1980  Bolus  Total Volume of Bolus (mL):  330  Total # of Feeds: 4  Tube Feed Frequency: Every 4 hours   Tube Feed Start Time: 16:00  Bolus Feed Rate (mL per Hour): 330   Bolus Feed Duration (in Hours): 1  Bolus Feed Instructions:  Nepro start @ 120 ml bolus, increase 120 ml per bolus as tolerated to GOAL bolus 330 ml 4x daily (08:00, 12:00, 16:00, 20:00).  Free Water Flush Instructions:  defer to team (22)    - Is current order appropriate/adequate? [x] Yes      - RN Reports pt tolerated 0800 bolus. NPO yesterday for AV fistula placement. No diarrhea    - Nutrition-related concerns:      - Hypophosphatemia  and  --> WNL  and          - vitamin D d/c'd       - RRT called overnight , pt disoriented, lethargic.      - s/p PEG 1/10,      - Per chart, pt ordered for SSI admelog, sinemet, Synthroid (PO), remeron, Nephro-celeste, folic acid      - Per chart, Sinemet is ordered for 06:00, 14:00, & 22:00.    GI:  Last BM  2/3  Bowel Regimen? No  : bowel regimen d/c'd due to loose stool       Weights:   Daily Weight in k.4 (), Weight in k.5 (), Weight in k () Daily Weight in k.8 (), Weight in k.8 (), Weight in k.8 (), Weight in k.8 (), Weight in k.6 (), Weight in k.3 (), Weight in k.3 (), 67.6 (-), 64.1 (), 64.1 (), 61.5 (01-10), 66.6 (), 67.1 (), 69.3 (),  69.8 (), 68.6 (),  68.6 (),  70.4 (-),  71 (-),  71 (-23).  Dosing wt: 96.4 kg (12-14)  Suspect wt changes due to HD fluid shifts. Noted with progressive wt decline, likely 2/2 poor PO intake and prolonged hospital course. In recent days, the weights have been relatively stable. RD to continue to monitor weight trends    Nutritionally Pertinent MEDICATIONS  (STANDING):  atorvastatin  cinacalcet  dextrose 40% Gel  dextrose 5%.  dextrose 5%.  dextrose 50% Injectable  dextrose 50% Injectable  dextrose 50% Injectable  dextrose 50% Injectable  folic acid  glucagon  Injectable  insulin glargine Injectable (LANTUS)  insulin lispro (ADMELOG) corrective regimen sliding scale  levothyroxine  metoprolol tartrate  midodrine  Nephro-celeste  pantoprazole   Suspension  tamsulosin    Pertinent Labs:  @ 05:39: Na 130<L>, BUN 97<H>, Cr 4.89<H>, <H>, K+ 4.7, Phos --, Mg --, Alk Phos --, ALT/SGPT --, AST/SGOT --, HbA1c --    A1C with Estimated Average Glucose Result: 5.8 % (22 @ 09:27)  A1C with Estimated Average Glucose Result: 6.4 % (10-22-21 @ 08:56)    Finger Sticks:  POCT Blood Glucose.: 128 mg/dL ( @ 06:54)  POCT Blood Glucose.: 99 mg/dL ( @ 23:54)  POCT Blood Glucose.: 77 mg/dL ( @ 21:36)  POCT Blood Glucose.: 83 mg/dL ( @ 18:17)  POCT Blood Glucose.: 84 mg/dL ( @ 11:49)      Skin per nursing documentation:  no pressure injuries   Edema: no edema    Estimated Needs:   [x] no change since previous assessment  3749-7866 kcal/day (30-35 kcal/kg)   gm protein/day (1.2-1.4 g/kg)  Defer fluid needs to team  based on IBW 78 kg    Previous Nutrition Diagnosis: Increased Nutrient Needs; Food and Nutrition Related Knowledge Deficit;  Severe acute on chronic malnutrition    Nutrition Diagnosis is: [x] ongoing  [] resolved [] not applicable   Being addressed with tube feeding regimen via PEG and micronutrient supplementation    Nutrition Care Plan:  [x] In Progress  [] Achieved  [] Not applicable    New Nutrition Diagnosis: [x] Not applicable    Nutrition Interventions:     Education Provided   [x] No: Not appropriate at this time.    Recommendations:      1) Recommend continue Nepro bolus @ 330 ml 4x daily (08:00, 12:00, 16:00, 20:00) with consideration for Sinemet administration. Defer free water flushes to team.     -Total regimen to provide 1320 ml total volume, 2347 kcal, 107 g pro, 960 ml free water.     -Provides 30 kcal/kg, 1.4 g pro/kg based on IBW 78 considering wt fluctuations. Meets >100% RDIs.  2) Please check phos.  3) Continue Nephro-celeste and Folic acid if no medical contraindications as medically appropriate.  4) Monitor EN tolerance, skin integrity, labs, weight.  5) RD remains available to adjust EN Regimen as needed.    Monitoring and Evaluation:   Continue to monitor nutritional intake, tolerance to diet prescription, weights, labs, skin integrity    RD remains available upon request and will follow up per protocol  Jada Hou RD, CDN. Pager: [phone].

## 2022-02-04 NOTE — PROCEDURE NOTE - NSPOSTPRCRAD_GEN_A_CORE
central line located in the superior vena cava/depth of insertion/line adjusted to depth of insertion/no pneumothorax/post-procedure radiography performed
central line located in the superior vena cava/no pneumothorax/post-procedure radiography performed

## 2022-02-04 NOTE — PROCEDURE NOTE - NSPOSTCAREGUIDE_GEN_A_CORE
Verbal/written post procedure instructions were given to patient/caregiver/Instructed patient/caregiver regarding signs and symptoms of infection/Keep the cast/splint/dressing clean and dry/Care for catheter as per unit/ICU protocols
Care for catheter as per unit/ICU protocols

## 2022-02-04 NOTE — PROGRESS NOTE ADULT - SUBJECTIVE AND OBJECTIVE BOX
[facility] [phone]        Patient seen and examined in bed.  He was the same         MEDICATIONS  (STANDING):  atorvastatin 80 milliGRAM(s) Oral at bedtime  carbidopa/levodopa  25/100 2.5 Tablet(s) Oral <User Schedule>  carbidopa/levodopa  25/100 2 Tablet(s) Oral <User Schedule>  chlorhexidine 4% Liquid 1 Application(s) Topical <User Schedule>  cinacalcet 60 milliGRAM(s) Oral daily  dextrose 40% Gel 15 Gram(s) Oral once  dextrose 5%. 1000 milliLiter(s) (50 mL/Hr) IV Continuous <Continuous>  dextrose 5%. 1000 milliLiter(s) (100 mL/Hr) IV Continuous <Continuous>  dextrose 50% Injectable 25 Gram(s) IV Push once  dextrose 50% Injectable 12.5 Gram(s) IV Push once  dextrose 50% Injectable 25 Gram(s) IV Push once  dextrose 50% Injectable 25 Gram(s) IV Push once  diVALproex Sprinkle 250 milliGRAM(s) Oral three times a day  DULoxetine 20 milliGRAM(s) Oral daily  folic acid 1 milliGRAM(s) Oral daily  glucagon  Injectable 1 milliGRAM(s) IntraMuscular once  insulin glargine Injectable (LANTUS) 6 Unit(s) SubCutaneous at bedtime  insulin lispro (ADMELOG) corrective regimen sliding scale   SubCutaneous every 6 hours  levothyroxine 25 MICROGram(s) Oral daily  memantine 5 milliGRAM(s) Oral at bedtime  metoprolol tartrate 50 milliGRAM(s) Oral two times a day  midodrine 10 milliGRAM(s) Oral <User Schedule>  mirtazapine 7.5 milliGRAM(s) Oral daily  Nephro-celeste 1 Tablet(s) Oral daily  pantoprazole   Suspension 40 milliGRAM(s) Enteral Tube two times a day  QUEtiapine 25 milliGRAM(s) Oral at bedtime  tamsulosin 0.4 milliGRAM(s) Oral at bedtime  trihexyphenidyl 2 milliGRAM(s) Oral three times a day      VITAL:  T(C): , Max: 36.5 (02-03-22 @ 14:30)  T(F): , Max: 97.7 (02-03-22 @ 14:30)  HR: 91 (02-04-22 @ 04:10)  BP: 149/85 (02-04-22 @ 04:10)  BP(mean): --  RR: 18 (02-04-22 @ 04:10)  SpO2: 93% (02-04-22 @ 04:10)  Wt(kg): --    I and O's:    02-03 @ 07:01  -  02-04 @ 07:00  --------------------------------------------------------  IN: 600 mL / OUT: 1020 mL / NET: -420 mL          PHYSICAL EXAM:    Constitutional: NAD  Neck:  No JVD  Respiratory: CTAB/L  Cardiovascular: S1 and S2  Gastrointestinal: BS+, soft, NT/ND  Extremities: No peripheral edema  Neurological: A/O x 3, no focal deficits  Psychiatric: Normal mood, normal affect  : No Javier  Skin: No rashes  Access: avf     LABS:                        8.9    8.39  )-----------( 319      ( 04 Feb 2022 06:07 )             28.6     02-04    130<L>  |  93<L>  |  102<H>  ----------------------------<  110<H>  4.2   |  22  |  4.74<H>    Ca    9.1      04 Feb 2022 06:06  Phos  4.1     02-04            Urine Studies:          RADIOLOGY & ADDITIONAL STUDIES:

## 2022-02-04 NOTE — PROGRESS NOTE ADULT - ASSESSMENT
71M w/ HTN, DM2, CAD-CABG, Parkinson's disease, and advanced CKD, 10/21/21 p/w LE swelling, c/b COVID; now newly ESRD-HD as of this admission    (1)Renal - ESRD-HD TTS - last dialyzed Thursday  (2)Hyponatremia - higher Na+ bath with dialysis        RECOMMEND  (1)HD today p the perm cath;  THe avf is not ready to use and will need more BAM ;  IR work appreciated    (2)Nutritional support       DW ACP       [facility]  [phone]

## 2022-02-04 NOTE — PROGRESS NOTE ADULT - ASSESSMENT
Assessment  DMT2: 71y Male with DM T2 with hyperglycemia, A1C 6.4%, was on insulin at home, now on basal insulin and coverage, blood sugars trending within overall acceptable range, no hypoglycemias, had permacath malfunction, NAD, tube feeds.  Hypothyroidism: Patient has no history thyroid disease, was not on any thyroid supplements, subclinical with low-normal FT4, lethargic, on synthroid 25 mcg po daily, repeat TFTs improved and euthyroid.  Hypercalcemia: Started on Sensipar 60mg daily, Ca improving.  CHF: on medications, stable, monitored.  HTN: Controlled,  on antihypertensive medications.  Parkinsons: on meds, monitored.  CKD: Monitor labs/BMP      Dr Ayers  Cell : [phone]   Assessment  DMT2: 71y Male with DM T2 with hyperglycemia, A1C 6.4%, was on insulin at home, now on basal insulin and coverage, blood sugars trending within overall acceptable range, no hypoglycemias, had permacath  malfunction, NAD, tube feeds.  Hypothyroidism: Patient has no history thyroid disease, was not on any thyroid supplements, subclinical with low-normal FT4, lethargic, on synthroid 25 mcg po daily, repeat TFTs improved and euthyroid.  Hypercalcemia: Started on Sensipar 60mg daily, Ca improving.  CHF: on medications, stable, monitored.  HTN: Controlled,  on antihypertensive medications.  Parkinsons: on meds, monitored.  CKD: Monitor labs/BMP      Dr Ayers  Cell : [phone]

## 2022-02-04 NOTE — PROGRESS NOTE ADULT - ASSESSMENT
70yo M w/ PMHx of CAD (s/p CABG 2019), CKD (unknown stage), DM2, Parkinson's Disease, HTN, depression presents with new onset acute heart failure exacerbation, NSTEMI, started on hep gtt, developed bl RP bleed, acute anemia. sp MICU course for hemorrhagic shock, 10/28 sp IR embolization l4 and l5 lumbar artery. sp permacath and AVF.    # chronic systolic and diastolic heart failure  # ESRD, new HD  # Atrial flutter  # Parkinson's disease   # delirium  # CAD (coronary artery disease)  # HTN  # DM2 (diabetes mellitus, type 2)  # FTT sp PEG  midodrine during dialysis  sp L AVF using for HD, permacath dced  1/29 malfunctioning Lt AVF - 2/2 sp fistulogram and angioplasty by IR - nonfunctioning  ID following, no indication for abx  insulin per endo  lopressor, atorvastatin  peg tube for tube feeds, abd binder  flomax  for permcath    DVT ppx hsq    DNR/DNI    [facility]  [phone]

## 2022-02-04 NOTE — PROGRESS NOTE ADULT - SUBJECTIVE AND OBJECTIVE BOX
INTERVAL HPI/OVERNIGHT EVENTS:    Pt seen and examined. Tolerating PEG feeds    MEDICATIONS  (STANDING):  atorvastatin 80 milliGRAM(s) Oral at bedtime  carbidopa/levodopa  25/100 2.5 Tablet(s) Oral <User Schedule>  carbidopa/levodopa  25/100 2 Tablet(s) Oral <User Schedule>  chlorhexidine 4% Liquid 1 Application(s) Topical <User Schedule>  cinacalcet 60 milliGRAM(s) Oral daily  dextrose 40% Gel 15 Gram(s) Oral once  dextrose 5%. 1000 milliLiter(s) (50 mL/Hr) IV Continuous <Continuous>  dextrose 5%. 1000 milliLiter(s) (100 mL/Hr) IV Continuous <Continuous>  dextrose 50% Injectable 25 Gram(s) IV Push once  dextrose 50% Injectable 12.5 Gram(s) IV Push once  dextrose 50% Injectable 25 Gram(s) IV Push once  dextrose 50% Injectable 25 Gram(s) IV Push once  diVALproex Sprinkle 250 milliGRAM(s) Oral three times a day  DULoxetine 20 milliGRAM(s) Oral daily  folic acid 1 milliGRAM(s) Oral daily  glucagon  Injectable 1 milliGRAM(s) IntraMuscular once  insulin glargine Injectable (LANTUS) 6 Unit(s) SubCutaneous at bedtime  insulin lispro (ADMELOG) corrective regimen sliding scale   SubCutaneous every 6 hours  levothyroxine 25 MICROGram(s) Oral daily  memantine 5 milliGRAM(s) Oral at bedtime  metoprolol tartrate 50 milliGRAM(s) Oral two times a day  midodrine 10 milliGRAM(s) Oral <User Schedule>  mirtazapine 7.5 milliGRAM(s) Oral daily  Nephro-celeste 1 Tablet(s) Oral daily  pantoprazole   Suspension 40 milliGRAM(s) Enteral Tube two times a day  QUEtiapine 25 milliGRAM(s) Oral at bedtime  tamsulosin 0.4 milliGRAM(s) Oral at bedtime  trihexyphenidyl 2 milliGRAM(s) Oral three times a day    MEDICATIONS  (PRN):  acetaminophen     Tablet .. 650 milliGRAM(s) Oral every 6 hours PRN Mild Pain (1 - 3)  albuterol/ipratropium for Nebulization 3 milliLiter(s) Nebulizer every 6 hours PRN Shortness of Breath and/or Wheezing  diVALproex Sprinkle 125 milliGRAM(s) Oral every 8 hours PRN Agitation  guaiFENesin Oral Liquid (Sugar-Free) 200 milliGRAM(s) Oral every 6 hours PRN Cough  ondansetron Injectable 4 milliGRAM(s) IV Push once PRN Nausea and/or Vomiting  QUEtiapine 12.5 milliGRAM(s) Oral every 6 hours PRN agitation  sodium chloride 0.9% lock flush 10 milliLiter(s) IV Push every 1 hour PRN Pre/post blood products, medications, blood draw, and to maintain line patency      Allergies    adhesives (Rash)  latex (Urticaria)  No Known Drug Allergies    Intolerances        Review of Systems:  unable to obtain      Vital Signs Last 24 Hrs  T(C): 36.3 (04 Feb 2022 12:04), Max: 36.5 (03 Feb 2022 14:30)  T(F): 97.4 (04 Feb 2022 12:04), Max: 97.7 (03 Feb 2022 14:30)  HR: 92 (04 Feb 2022 12:04) (67 - 110)  BP: 150/81 (04 Feb 2022 12:04) (118/63 - 158/85)  BP(mean): --  RR: 18 (04 Feb 2022 12:04) (18 - 18)  SpO2: 93% (04 Feb 2022 12:04) (93% - 95%)    PHYSICAL EXAM:    GENERAL:  Appears stated age, well-groomed, well-nourished, no distress  HEENT:  NC/AT,  conjunctivae anicteric, clear and pink,   NECK: supple, trachea midline  CHEST:  Full & symmetric excursion, no increased effort, breath sounds clear  HEART:  Regular rhythm, no JVD  ABDOMEN:  Soft, non-tender, non-distended, normoactive bowel sounds,  no masses , no hepatosplenomegaly  EXTREMITIES:  no cyanosis, clubbing or edema  SKIN:  No rash, erythema, or, ecchymoses, no jaundice  NEURO:  non-focal, no asterixis  RECTAL: Deferred      LABS:                        8.9    8.39  )-----------( 319      ( 04 Feb 2022 06:07 )             28.6     02-04    130<L>  |  93<L>  |  102<H>  ----------------------------<  110<H>  4.2   |  22  |  4.74<H>    Ca    9.1      04 Feb 2022 06:06  Phos  4.1     02-04            RADIOLOGY & ADDITIONAL TESTS:

## 2022-02-04 NOTE — PROGRESS NOTE ADULT - ASSESSMENT
71 M w volume overload, GI consulted for drop in hgb    1. CHF per cardio    2. ABBY on CKD per renal  -on HD via permacath per renal, midodrine during dialysis  -AVF not functional     3. RP bleed  s/p Abdominal Angiography and Embolization on 10/29/2021 by Interventional radiology of l4and l5 lumbar artery     4. Failure to thrive  -s/p PEG 1/10    -maintain abdominal binder  -tolerating TF, continue TF  -aspiration precautions    5. stress duodenal ulcers  -PPI BID    6. Constipation  -resolved  -nonobstructive gas pattern on x-ray 1/13  -continue Miralax BID  -DC planning to rehab underway     [facility]  Gastroenterology and Hepatology  [address] [place], [place]  Office: [phone]  Cell: [phone]

## 2022-02-04 NOTE — PROGRESS NOTE ADULT - SUBJECTIVE AND OBJECTIVE BOX
Chief complaint  Patient is a 71y old  Male who presents with a chief complaint of leg swelling (04 Feb 2022 12:45)   Review of systems  Patient in bed, looks comfortable, no hypoglycemic episodes.    Labs and Fingersticks  CAPILLARY BLOOD GLUCOSE      POCT Blood Glucose.: 161 mg/dL (04 Feb 2022 12:22)  POCT Blood Glucose.: 115 mg/dL (04 Feb 2022 05:35)  POCT Blood Glucose.: 119 mg/dL (04 Feb 2022 03:42)  POCT Blood Glucose.: 171 mg/dL (03 Feb 2022 23:22)  POCT Blood Glucose.: 209 mg/dL (03 Feb 2022 17:58)      Anion Gap, Serum: 15 (02-04 @ 06:06)  Anion Gap, Serum: 16 (02-03 @ 05:39)      Calcium, Total Serum: 9.1 (02-04 @ 06:06)  Calcium, Total Serum: 9.1 (02-03 @ 05:39)          02-04    130<L>  |  93<L>  |  102<H>  ----------------------------<  110<H>  4.2   |  22  |  4.74<H>    Ca    9.1      04 Feb 2022 06:06  Phos  4.1     02-04                          8.9    8.39  )-----------( 319      ( 04 Feb 2022 06:07 )             28.6     Medications  MEDICATIONS  (STANDING):  atorvastatin 80 milliGRAM(s) Oral at bedtime  carbidopa/levodopa  25/100 2.5 Tablet(s) Oral <User Schedule>  carbidopa/levodopa  25/100 2 Tablet(s) Oral <User Schedule>  chlorhexidine 4% Liquid 1 Application(s) Topical <User Schedule>  cinacalcet 60 milliGRAM(s) Oral daily  dextrose 40% Gel 15 Gram(s) Oral once  dextrose 5%. 1000 milliLiter(s) (50 mL/Hr) IV Continuous <Continuous>  dextrose 5%. 1000 milliLiter(s) (100 mL/Hr) IV Continuous <Continuous>  dextrose 50% Injectable 25 Gram(s) IV Push once  dextrose 50% Injectable 12.5 Gram(s) IV Push once  dextrose 50% Injectable 25 Gram(s) IV Push once  dextrose 50% Injectable 25 Gram(s) IV Push once  diVALproex Sprinkle 250 milliGRAM(s) Oral three times a day  DULoxetine 20 milliGRAM(s) Oral daily  folic acid 1 milliGRAM(s) Oral daily  glucagon  Injectable 1 milliGRAM(s) IntraMuscular once  insulin glargine Injectable (LANTUS) 6 Unit(s) SubCutaneous at bedtime  insulin lispro (ADMELOG) corrective regimen sliding scale   SubCutaneous every 6 hours  levothyroxine 25 MICROGram(s) Oral daily  memantine 5 milliGRAM(s) Oral at bedtime  metoprolol tartrate 50 milliGRAM(s) Oral two times a day  midodrine 10 milliGRAM(s) Oral <User Schedule>  mirtazapine 7.5 milliGRAM(s) Oral daily  Nephro-celeste 1 Tablet(s) Oral daily  pantoprazole   Suspension 40 milliGRAM(s) Enteral Tube two times a day  QUEtiapine 25 milliGRAM(s) Oral at bedtime  tamsulosin 0.4 milliGRAM(s) Oral at bedtime  trihexyphenidyl 2 milliGRAM(s) Oral three times a day      Physical Exam  General: Patient comfortable in bed  Vital Signs Last 12 Hrs  T(F): 97.4 (02-04-22 @ 12:04), Max: 97.4 (02-04-22 @ 12:04)  HR: 92 (02-04-22 @ 12:04) (91 - 92)  BP: 150/81 (02-04-22 @ 12:04) (149/85 - 150/81)  BP(mean): --  RR: 18 (02-04-22 @ 12:04) (18 - 18)  SpO2: 93% (02-04-22 @ 12:04) (93% - 93%)  Neck: No palpable thyroid nodules.  CVS: S1S2, No murmurs  Respiratory: No wheezing, no crepitations  GI: Abdomen soft, bowel sounds positive  Musculoskeletal:  edema lower extremities.   Skin: No skin rashes, no ecchymosis    Diagnostics             Chief complaint  Patient is a 71y old  Male who presents with a chief complaint of leg swelling (04 Feb 2022 12:45)   Review of systems  Patient in bed, looks comfortable, no hypoglycemic episodes.    Labs and Fingersticks  CAPILLARY BLOOD GLUCOSE      POCT Blood Glucose.: 161 mg/dL (04 Feb 2022 12:22)  POCT Blood Glucose.: 115 mg/dL (04 Feb 2022 05:35)  POCT Blood Glucose.: 119 mg/dL (04 Feb 2022 03:42)  POCT Blood Glucose.: 171 mg/dL (03 Feb 2022 23:22)  POCT Blood Glucose.: 209 mg/dL (03 Feb 2022 17:58)      Anion Gap, Serum: 15 (02-04 @ 06:06)  Anion Gap, Serum: 16 (02-03 @ 05:39)      Calcium, Total Serum: 9.1 (02-04 @ 06:06)  Calcium, Total Serum: 9.1 (02-03 @ 05:39)          02-04    130<L>  |  93<L>  |  102<H>  ----------------------------<  110<H>  4.2   |  22  |  4.74<H>    Ca    9.1      04 Feb 2022 06:06  Phos  4.1     02-04                          8.9    8.39  )-----------( 319      ( 04 Feb 2022 06:07 )             28.6     Medications  MEDICATIONS  (STANDING):  atorvastatin 80 milliGRAM(s) Oral at bedtime  carbidopa/levodopa  25/100 2.5 Tablet(s) Oral <User Schedule>  carbidopa/levodopa  25/100 2 Tablet(s) Oral <User Schedule>  chlorhexidine 4% Liquid 1 Application(s) Topical <User Schedule>  cinacalcet 60 milliGRAM(s) Oral daily  dextrose 40% Gel 15 Gram(s) Oral once  dextrose 5%. 1000 milliLiter(s) (50 mL/Hr) IV Continuous <Continuous>  dextrose 5%. 1000 milliLiter(s) (100 mL/Hr) IV Continuous <Continuous>  dextrose 50% Injectable 25 Gram(s) IV Push once  dextrose 50% Injectable 12.5 Gram(s) IV Push once  dextrose 50% Injectable 25 Gram(s) IV Push once  dextrose 50% Injectable 25 Gram(s) IV Push once  diVALproex Sprinkle 250 milliGRAM(s) Oral three times a day  DULoxetine 20 milliGRAM(s) Oral daily  folic acid 1 milliGRAM(s) Oral daily  glucagon  Injectable 1 milliGRAM(s) IntraMuscular once  insulin glargine Injectable (LANTUS) 6 Unit(s) SubCutaneous at bedtime  insulin lispro (ADMELOG) corrective regimen sliding scale   SubCutaneous every 6 hours  levothyroxine 25 MICROGram(s) Oral daily  memantine 5 milliGRAM(s) Oral at bedtime  metoprolol tartrate 50 milliGRAM(s) Oral two times a day  midodrine 10 milliGRAM(s) Oral <User Schedule>  mirtazapine 7.5 milliGRAM(s) Oral daily  Nephro-celeste 1 Tablet(s) Oral daily  pantoprazole   Suspension 40 milliGRAM(s) Enteral Tube two times a day  QUEtiapine 25 milliGRAM(s) Oral at bedtime  tamsulosin 0.4 milliGRAM(s) Oral at bedtime  trihexyphenidyl 2 milliGRAM(s) Oral three times a day      Physical Exam  General: Patient comfortable in bed  Vital Signs Last 12 Hrs  T(F): 97.4 (02-04-22 @ 12:04), Max: 97.4 (02-04-22 @ 12:04)  HR: 92 (02-04-22 @ 12:04) (91 - 92)  BP: 150/81 (02-04-22 @ 12:04) (149/85 - 150/81)  BP(mean): --  RR: 18 (02-04-22 @ 12:04) (18 - 18)  SpO2: 93% (02-04-22 @ 12:04) (93% - 93%)  Neck: No palpable thyroid nodules.  CVS: S1S2, No murmurs  Respiratory: No wheezing, no crepitations  GI: Abdomen soft, bowel sounds positive  Musculoskeletal:  edema lower extremities.   Skin: No skin rashes, no ecchymosis    Diagnostics

## 2022-02-04 NOTE — PROGRESS NOTE ADULT - SUBJECTIVE AND OBJECTIVE BOX
Patient is a 71y old  Male who presents with a chief complaint of leg swelling (04 Feb 2022 13:13)      SUBJECTIVE / OVERNIGHT EVENTS:    Patient seen and examined. for permacath.      Vital Signs Last 24 Hrs  T(C): 36.3 (04 Feb 2022 12:04), Max: 36.5 (03 Feb 2022 14:30)  T(F): 97.4 (04 Feb 2022 12:04), Max: 97.7 (03 Feb 2022 14:30)  HR: 92 (04 Feb 2022 12:04) (91 - 110)  BP: 150/81 (04 Feb 2022 12:04) (120/80 - 158/85)  BP(mean): --  RR: 18 (04 Feb 2022 12:04) (18 - 18)  SpO2: 93% (04 Feb 2022 12:04) (93% - 95%)  I&O's Summary    03 Feb 2022 07:01  -  04 Feb 2022 07:00  --------------------------------------------------------  IN: 600 mL / OUT: 1020 mL / NET: -420 mL        PE:  GENERAL: NAD, AAOx1  HEAD:  Atraumatic, Normocephalic  EYES: conjunctiva and sclera clear  NECK: No JVD  CHEST/LUNG: CTABL, No wheeze  HEART: Regular rate and rhythm; no murmur  ABDOMEN: Soft, Nontender, Nondistended; Bowel sounds present  EXTREMITIES:  2+ Peripheral Pulses, left avf  SKIN: No rashes or lesions    LABS:                        8.9    8.39  )-----------( 319      ( 04 Feb 2022 06:07 )             28.6     02-04    130<L>  |  93<L>  |  102<H>  ----------------------------<  110<H>  4.2   |  22  |  4.74<H>    Ca    9.1      04 Feb 2022 06:06  Phos  4.1     02-04        CAPILLARY BLOOD GLUCOSE      POCT Blood Glucose.: 161 mg/dL (04 Feb 2022 12:22)  POCT Blood Glucose.: 115 mg/dL (04 Feb 2022 05:35)  POCT Blood Glucose.: 119 mg/dL (04 Feb 2022 03:42)  POCT Blood Glucose.: 171 mg/dL (03 Feb 2022 23:22)  POCT Blood Glucose.: 209 mg/dL (03 Feb 2022 17:58)            RADIOLOGY & ADDITIONAL TESTS:    Imaging Personally Reviewed:  [x] YES  [ ] NO    Consultant(s) Notes Reviewed:  [x] YES  [ ] NO    MEDICATIONS  (STANDING):  atorvastatin 80 milliGRAM(s) Oral at bedtime  carbidopa/levodopa  25/100 2.5 Tablet(s) Oral <User Schedule>  carbidopa/levodopa  25/100 2 Tablet(s) Oral <User Schedule>  chlorhexidine 4% Liquid 1 Application(s) Topical <User Schedule>  cinacalcet 60 milliGRAM(s) Oral daily  dextrose 40% Gel 15 Gram(s) Oral once  dextrose 5%. 1000 milliLiter(s) (50 mL/Hr) IV Continuous <Continuous>  dextrose 5%. 1000 milliLiter(s) (100 mL/Hr) IV Continuous <Continuous>  dextrose 50% Injectable 25 Gram(s) IV Push once  dextrose 50% Injectable 12.5 Gram(s) IV Push once  dextrose 50% Injectable 25 Gram(s) IV Push once  dextrose 50% Injectable 25 Gram(s) IV Push once  diVALproex Sprinkle 250 milliGRAM(s) Oral three times a day  DULoxetine 20 milliGRAM(s) Oral daily  folic acid 1 milliGRAM(s) Oral daily  glucagon  Injectable 1 milliGRAM(s) IntraMuscular once  insulin glargine Injectable (LANTUS) 6 Unit(s) SubCutaneous at bedtime  insulin lispro (ADMELOG) corrective regimen sliding scale   SubCutaneous every 6 hours  levothyroxine 25 MICROGram(s) Oral daily  memantine 5 milliGRAM(s) Oral at bedtime  metoprolol tartrate 50 milliGRAM(s) Oral two times a day  midodrine 10 milliGRAM(s) Oral <User Schedule>  mirtazapine 7.5 milliGRAM(s) Oral daily  Nephro-celeste 1 Tablet(s) Oral daily  pantoprazole   Suspension 40 milliGRAM(s) Enteral Tube two times a day  QUEtiapine 25 milliGRAM(s) Oral at bedtime  tamsulosin 0.4 milliGRAM(s) Oral at bedtime  trihexyphenidyl 2 milliGRAM(s) Oral three times a day    MEDICATIONS  (PRN):  acetaminophen     Tablet .. 650 milliGRAM(s) Oral every 6 hours PRN Mild Pain (1 - 3)  albuterol/ipratropium for Nebulization 3 milliLiter(s) Nebulizer every 6 hours PRN Shortness of Breath and/or Wheezing  diVALproex Sprinkle 125 milliGRAM(s) Oral every 8 hours PRN Agitation  guaiFENesin Oral Liquid (Sugar-Free) 200 milliGRAM(s) Oral every 6 hours PRN Cough  ondansetron Injectable 4 milliGRAM(s) IV Push once PRN Nausea and/or Vomiting  QUEtiapine 12.5 milliGRAM(s) Oral every 6 hours PRN agitation  sodium chloride 0.9% lock flush 10 milliLiter(s) IV Push every 1 hour PRN Pre/post blood products, medications, blood draw, and to maintain line patency      Care Discussed with Consultants/Other Providers [x] YES  [ ] NO    HEALTH ISSUES - PROBLEM Dx:  Acute heart failure    Atrial flutter    DM2 (diabetes mellitus, type 2)    CAD (coronary artery disease)    Parkinsons disease    Acute on chronic renal failure    NSTEMI (non-ST elevation myocardial infarction)    Hypertension    Acute kidney injury superimposed on CKD    Anemia    Hypothyroidism    Delirium    Advanced care planning/counseling discussion    Palliative care status    Palliative care encounter    Pain    Stage 5 chronic kidney disease not on chronic dialysis    Hypercalcemia    Preop cardiovascular exam

## 2022-02-04 NOTE — PROCEDURE NOTE - ADDITIONAL PROCEDURE DETAILS
RIGHT IJ CVC HD CATHETER   TIP IN SVC, VERIFIED UNDER IMAGING   CATHETER CAN BE USED.
nontunneled HD catheter placed in right internal jugular vein. 14fr, 15cm. Catheter tip in SVC. Ok to use catheter.

## 2022-02-05 NOTE — PROGRESS NOTE ADULT - ASSESSMENT
No pain, no shortness of breath    Review of systems: All 10 points ROS was obtained except as above.     acetaminophen     Tablet .. 650 milliGRAM(s) Oral every 6 hours PRN  albuterol/ipratropium for Nebulization 3 milliLiter(s) Nebulizer every 6 hours PRN  atorvastatin 80 milliGRAM(s) Oral at bedtime  carbidopa/levodopa  25/100 2.5 Tablet(s) Oral <User Schedule>  carbidopa/levodopa  25/100 2 Tablet(s) Oral <User Schedule>  chlorhexidine 4% Liquid 1 Application(s) Topical <User Schedule>  cinacalcet 60 milliGRAM(s) Oral daily  dextrose 40% Gel 15 Gram(s) Oral once  dextrose 5%. 1000 milliLiter(s) IV Continuous <Continuous>  dextrose 5%. 1000 milliLiter(s) IV Continuous <Continuous>  dextrose 50% Injectable 25 Gram(s) IV Push once  dextrose 50% Injectable 12.5 Gram(s) IV Push once  dextrose 50% Injectable 25 Gram(s) IV Push once  dextrose 50% Injectable 12.5 Gram(s) IV Push once  dextrose 50% Injectable 25 Gram(s) IV Push once  diVALproex Sprinkle 125 milliGRAM(s) Oral every 8 hours PRN  diVALproex Sprinkle 250 milliGRAM(s) Oral three times a day  DULoxetine 20 milliGRAM(s) Oral daily  folic acid 1 milliGRAM(s) Oral daily  glucagon  Injectable 1 milliGRAM(s) IntraMuscular once  guaiFENesin Oral Liquid (Sugar-Free) 200 milliGRAM(s) Oral every 6 hours PRN  insulin glargine Injectable (LANTUS) 6 Unit(s) SubCutaneous at bedtime  insulin lispro (ADMELOG) corrective regimen sliding scale   SubCutaneous every 6 hours  levothyroxine 25 MICROGram(s) Oral daily  memantine 5 milliGRAM(s) Oral at bedtime  metoprolol tartrate 50 milliGRAM(s) Oral two times a day  midodrine 10 milliGRAM(s) Oral <User Schedule>  mirtazapine 7.5 milliGRAM(s) Oral daily  Nephro-celeste 1 Tablet(s) Oral daily  ondansetron Injectable 4 milliGRAM(s) IV Push once PRN  pantoprazole   Suspension 40 milliGRAM(s) Enteral Tube two times a day  QUEtiapine 25 milliGRAM(s) Oral at bedtime  QUEtiapine 12.5 milliGRAM(s) Oral every 6 hours PRN  sodium chloride 0.9% lock flush 10 milliLiter(s) IV Push every 1 hour PRN  tamsulosin 0.4 milliGRAM(s) Oral at bedtime  trihexyphenidyl 2 milliGRAM(s) Oral three times a day      VITAL:  T(C): , Max: 36.5 (02-04-22 @ 16:15)  T(F): , Max: 97.7 (02-04-22 @ 16:15)  HR: 102 (02-05-22 @ 12:18)  BP: 156/87 (02-05-22 @ 12:18)  BP(mean): --  RR: 22 (02-05-22 @ 12:18)  SpO2: 92% (02-05-22 @ 12:18)  Wt(kg): --    02-04-22 @ 07:01  -  02-05-22 @ 07:00  --------------------------------------------------------  IN: 330 mL / OUT: 1100 mL / NET: -770 mL    02-05-22 @ 07:01  -  02-05-22 @ 15:20  --------------------------------------------------------  IN: 0 mL / OUT: 150 mL / NET: -150 mL        PHYSICAL EXAM:  Constitutional: NAD  Neck:  No JVD  Respiratory: CTAB/L  Cardiovascular: S1 and S2  Gastrointestinal: BS+, soft, NT/ND  Extremities: No peripheral edema  Neurological: A/O x 3, no focal deficits  Psychiatric: Normal mood, normal affect  : No Javier  Skin: No rashes  Access: non tunneled Hd catheter , maturing avf     LABS:                          9.2    8.65  )-----------( 336      ( 05 Feb 2022 05:17 )             29.1     Na(132)/K(3.4)/Cl(95)/HCO3(27)/BUN(49)/Cr(2.85)Glu(82)/Ca(9.0)/Mg(--)/PO4(--)    02-05 @ 05:16  Na(130)/K(4.2)/Cl(93)/HCO3(22)/BUN(102)/Cr(4.74)Glu(110)/Ca(9.1)/Mg(--)/PO4(4.1)    02-04 @ 06:06  Na(130)/K(4.7)/Cl(93)/HCO3(21)/BUN(97)/Cr(4.89)Glu(132)/Ca(9.1)/Mg(--)/PO4(--)    02-03 @ 05:39            ASSESSMENT/PLAN  71M w/ HTN, DM2, CAD-CABG, Parkinson's disease, and advanced CKD, 10/21/21 p/w LE swelling, c/b COVID; now newly ESRD-HD as of this admission    (1)Renal - ESRD-HD TTS - last dialyzed Thursday  (2)Hyponatremia - higher Na+ bath with dialysis        RECOMMEND  (1)HD today p the perm cath;  The avf is not ready to use and will need more BAM ;  IR work appreciated    (2)Nutritional support     Get Delcid, NP-BC  [employer]  [phone]   s/p HD yesterday with 500ml fluid net removed  Denies complaints  Next HD Monday    acetaminophen     Tablet .. 650 milliGRAM(s) Oral every 6 hours PRN  albuterol/ipratropium for Nebulization 3 milliLiter(s) Nebulizer every 6 hours PRN  atorvastatin 80 milliGRAM(s) Oral at bedtime  carbidopa/levodopa  25/100 2.5 Tablet(s) Oral <User Schedule>  carbidopa/levodopa  25/100 2 Tablet(s) Oral <User Schedule>  chlorhexidine 4% Liquid 1 Application(s) Topical <User Schedule>  cinacalcet 60 milliGRAM(s) Oral daily  dextrose 40% Gel 15 Gram(s) Oral once  dextrose 5%. 1000 milliLiter(s) IV Continuous <Continuous>  dextrose 5%. 1000 milliLiter(s) IV Continuous <Continuous>  dextrose 50% Injectable 25 Gram(s) IV Push once  dextrose 50% Injectable 12.5 Gram(s) IV Push once  dextrose 50% Injectable 25 Gram(s) IV Push once  dextrose 50% Injectable 12.5 Gram(s) IV Push once  dextrose 50% Injectable 25 Gram(s) IV Push once  diVALproex Sprinkle 125 milliGRAM(s) Oral every 8 hours PRN  diVALproex Sprinkle 250 milliGRAM(s) Oral three times a day  DULoxetine 20 milliGRAM(s) Oral daily  folic acid 1 milliGRAM(s) Oral daily  glucagon  Injectable 1 milliGRAM(s) IntraMuscular once  guaiFENesin Oral Liquid (Sugar-Free) 200 milliGRAM(s) Oral every 6 hours PRN  insulin glargine Injectable (LANTUS) 6 Unit(s) SubCutaneous at bedtime  insulin lispro (ADMELOG) corrective regimen sliding scale   SubCutaneous every 6 hours  levothyroxine 25 MICROGram(s) Oral daily  memantine 5 milliGRAM(s) Oral at bedtime  metoprolol tartrate 50 milliGRAM(s) Oral two times a day  midodrine 10 milliGRAM(s) Oral <User Schedule>  mirtazapine 7.5 milliGRAM(s) Oral daily  Nephro-celeste 1 Tablet(s) Oral daily  ondansetron Injectable 4 milliGRAM(s) IV Push once PRN  pantoprazole   Suspension 40 milliGRAM(s) Enteral Tube two times a day  QUEtiapine 25 milliGRAM(s) Oral at bedtime  QUEtiapine 12.5 milliGRAM(s) Oral every 6 hours PRN  sodium chloride 0.9% lock flush 10 milliLiter(s) IV Push every 1 hour PRN  tamsulosin 0.4 milliGRAM(s) Oral at bedtime  trihexyphenidyl 2 milliGRAM(s) Oral three times a day      VITAL:  T(C): , Max: 36.5 (02-04-22 @ 16:15)  T(F): , Max: 97.7 (02-04-22 @ 16:15)  HR: 102 (02-05-22 @ 12:18)  BP: 156/87 (02-05-22 @ 12:18)  BP(mean): --  RR: 22 (02-05-22 @ 12:18)  SpO2: 92% (02-05-22 @ 12:18)  Wt(kg): --    02-04-22 @ 07:01  -  02-05-22 @ 07:00  --------------------------------------------------------  IN: 330 mL / OUT: 1100 mL / NET: -770 mL    02-05-22 @ 07:01  -  02-05-22 @ 15:20  --------------------------------------------------------  IN: 0 mL / OUT: 150 mL / NET: -150 mL        PHYSICAL EXAM:  Constitutional: NAD  Neck:  No JVD  Respiratory: CTAB/L  Cardiovascular: S1 and S2  Gastrointestinal: BS+, soft, NT/ND  Extremities: No peripheral edema  Neurological: A/O x 3, no focal deficits  Psychiatric: Normal mood, normal affect  : No Javier  Skin: No rashes  Access: non tunneled Hd catheter , maturing avf     LABS:                          9.2    8.65  )-----------( 336      ( 05 Feb 2022 05:17 )             29.1     Na(132)/K(3.4)/Cl(95)/HCO3(27)/BUN(49)/Cr(2.85)Glu(82)/Ca(9.0)/Mg(--)/PO4(--)    02-05 @ 05:16  Na(130)/K(4.2)/Cl(93)/HCO3(22)/BUN(102)/Cr(4.74)Glu(110)/Ca(9.1)/Mg(--)/PO4(4.1)    02-04 @ 06:06  Na(130)/K(4.7)/Cl(93)/HCO3(21)/BUN(97)/Cr(4.89)Glu(132)/Ca(9.1)/Mg(--)/PO4(--)    02-03 @ 05:39            ASSESSMENT/PLAN  71M w/ HTN, DM2, CAD-CABG, Parkinson's disease, and advanced CKD, 10/21/21 p/w LE swelling, c/b COVID; now newly ESRD-HD as of this admission    (1)Renal - ESRD-HD TTS - was dialyzed Friday  (2)Hyponatremia - higher Na+ bath with dialysis; improving N+  (3)Anemia- hgb improving      RECOMMEND  (1)Next HD Monday. Please do not use AVF(maturing). Use non tunneled HD catheter    (2)Nutritional support     Get Delcid, NP-BC  [employer]  [phone]

## 2022-02-05 NOTE — PROGRESS NOTE ADULT - SUBJECTIVE AND OBJECTIVE BOX
Chief complaint    Patient is a 71y old  Male who presents with a chief complaint of leg swelling (05 Feb 2022 15:20)   Review of systems  Patient in bed, appears comfortable.    Labs and Fingersticks  CAPILLARY BLOOD GLUCOSE      POCT Blood Glucose.: 137 mg/dL (05 Feb 2022 16:55)  POCT Blood Glucose.: 205 mg/dL (05 Feb 2022 12:00)  POCT Blood Glucose.: 117 mg/dL (05 Feb 2022 06:54)  POCT Blood Glucose.: 72 mg/dL (05 Feb 2022 05:44)  POCT Blood Glucose.: 70 mg/dL (05 Feb 2022 05:43)  POCT Blood Glucose.: 192 mg/dL (05 Feb 2022 00:15)  POCT Blood Glucose.: 139 mg/dL (04 Feb 2022 22:56)      Anion Gap, Serum: 10 (02-05 @ 05:16)  Anion Gap, Serum: 15 (02-04 @ 06:06)      Calcium, Total Serum: 9.0 (02-05 @ 05:16)  Calcium, Total Serum: 9.1 (02-04 @ 06:06)          02-05    132<L>  |  95<L>  |  49<H>  ----------------------------<  82  3.4<L>   |  27  |  2.85<H>    Ca    9.0      05 Feb 2022 05:16  Phos  4.1     02-04                          9.2    8.65  )-----------( 336      ( 05 Feb 2022 05:17 )             29.1     Medications  MEDICATIONS  (STANDING):  atorvastatin 80 milliGRAM(s) Oral at bedtime  carbidopa/levodopa  25/100 2.5 Tablet(s) Oral <User Schedule>  carbidopa/levodopa  25/100 2 Tablet(s) Oral <User Schedule>  chlorhexidine 4% Liquid 1 Application(s) Topical <User Schedule>  cinacalcet 60 milliGRAM(s) Oral daily  dextrose 40% Gel 15 Gram(s) Oral once  dextrose 5%. 1000 milliLiter(s) (50 mL/Hr) IV Continuous <Continuous>  dextrose 5%. 1000 milliLiter(s) (100 mL/Hr) IV Continuous <Continuous>  dextrose 50% Injectable 25 Gram(s) IV Push once  dextrose 50% Injectable 12.5 Gram(s) IV Push once  dextrose 50% Injectable 25 Gram(s) IV Push once  dextrose 50% Injectable 25 Gram(s) IV Push once  dextrose 50% Injectable 12.5 Gram(s) IV Push once  diVALproex Sprinkle 250 milliGRAM(s) Oral three times a day  DULoxetine 20 milliGRAM(s) Oral daily  folic acid 1 milliGRAM(s) Oral daily  glucagon  Injectable 1 milliGRAM(s) IntraMuscular once  insulin glargine Injectable (LANTUS) 6 Unit(s) SubCutaneous at bedtime  insulin lispro (ADMELOG) corrective regimen sliding scale   SubCutaneous every 6 hours  levothyroxine 25 MICROGram(s) Oral daily  memantine 5 milliGRAM(s) Oral at bedtime  metoprolol tartrate 50 milliGRAM(s) Oral two times a day  midodrine 10 milliGRAM(s) Oral <User Schedule>  mirtazapine 7.5 milliGRAM(s) Oral daily  Nephro-celeste 1 Tablet(s) Oral daily  pantoprazole   Suspension 40 milliGRAM(s) Enteral Tube two times a day  QUEtiapine 25 milliGRAM(s) Oral at bedtime  tamsulosin 0.4 milliGRAM(s) Oral at bedtime  trihexyphenidyl 2 milliGRAM(s) Oral three times a day      Physical Exam  General: Patient comfortable in bed  Vital Signs Last 12 Hrs  T(F): 98.5 (02-05-22 @ 20:27), Max: 98.5 (02-05-22 @ 20:27)  HR: 107 (02-05-22 @ 20:27) (102 - 107)  BP: 151/95 (02-05-22 @ 20:27) (151/95 - 156/87)  BP(mean): --  RR: 20 (02-05-22 @ 20:27) (20 - 22)  SpO2: 92% (02-05-22 @ 20:27) (92% - 92%)  Neck: No palpable thyroid nodules.  CVS: S1S2, No murmurs  Respiratory: No wheezing, no crepitations  GI: Abdomen soft, bowel sounds positive  Musculoskeletal:  edema lower extremities.     Diagnostics    Free Thyroxine, Serum: AM Sched. Collection: 27-Jan-2022 06:00 (01-26 @ 08:06)  Thyroid Stimulating Hormone, Serum: AM Sched. Collection: 27-Jan-2022 06:00 (01-26 @ 08:06)  Free Thyroxine, Serum: AM Sched. Collection: 19-Jan-2022 06:00 (01-18 @ 15:42)  Cortisol AM, Serum: AM Sched. Collection: 15-Paul-2022 06:00 (01-14 @ 08:01)  Free Thyroxine, Serum: AM Sched. Collection: 15-Paul-2022 06:00 (01-14 @ 08:01)  Thyroid Stimulating Hormone, Serum: AM Sched. Collection: 15-Paul-2022 06:00 (01-14 @ 08:01)

## 2022-02-05 NOTE — PROGRESS NOTE ADULT - PROBLEM SELECTOR PROBLEM 5
[Duration: ___ wks] : duration: [unfilled] weeks [Vaginal] : Vaginal [___ lbs.] : [unfilled] lbs Hypertension [Was child in NICU?] : Child was not in NICU

## 2022-02-05 NOTE — PROGRESS NOTE ADULT - ASSESSMENT
70yo M w/ PMHx of CAD (s/p CABG 2019), CKD (unknown stage), DM2, Parkinson's Disease, HTN, depression presents with new onset acute heart failure exacerbation, NSTEMI, started on hep gtt, developed bl RP bleed, acute anemia. sp MICU course for hemorrhagic shock, 10/28 sp IR embolization l4 and l5 lumbar artery. sp permacath and AVF.    # chronic systolic and diastolic heart failure  # ESRD, new HD  # Atrial flutter  # Parkinson's disease   # delirium  # CAD (coronary artery disease)  # HTN  # DM2 (diabetes mellitus, type 2)  # FTT sp PEG  midodrine during dialysis  sp L AVF using for HD, permacath dced  1/29 malfunctioning Lt AVF - 2/2 sp fistulogram and angioplasty by IR - nonfunctioning  sp permacath  insulin per endo  lopressor, atorvastatin  peg tube for tube feeds, abd binder  flomax    DVT ppx hsq    DNR/DNI    dc planning with [facility]  [phone]   70yo M w/ PMHx of CAD (s/p CABG 2019), CKD (unknown stage), DM2, Parkinson's Disease, HTN, depression presents with new onset acute heart failure exacerbation, NSTEMI, started on hep gtt, developed bl RP bleed, acute anemia. sp MICU course for hemorrhagic shock, 10/28 sp IR embolization l4 and l5 lumbar artery. sp permacath and AVF.    # chronic systolic and diastolic heart failure  # ESRD, new HD  # Atrial flutter  # Parkinson's disease   # delirium  # CAD (coronary artery disease)  # HTN  # DM2 (diabetes mellitus, type 2)  # FTT sp PEG  midodrine during dialysis  sp L AVF using for HD, permacath dced  1/29 malfunctioning Lt AVF - 2/2 sp fistulogram and angioplasty by IR - nonfunctioning  pending permacath  insulin per endo  lopressor, atorvastatin  peg tube for tube feeds, abd binder  flomax    DVT ppx hsq    DNR/DNI    dc planning with [facility]  [phone]

## 2022-02-05 NOTE — PROVIDER CONTACT NOTE (HYPOGLYCEMIA EVENT) - NS PROVIDER CONTACT BACKGROUND-HYPO
Age: 71y    Gender: Male    POCT Blood Glucose:  117 mg/dL (02-05-22 @ 06:54)  72 mg/dL (02-05-22 @ 05:44)  70 mg/dL (02-05-22 @ 05:43)  192 mg/dL (02-05-22 @ 00:15)  139 mg/dL (02-04-22 @ 22:56)  80 mg/dL (02-04-22 @ 20:08)  161 mg/dL (02-04-22 @ 12:22)      eMAR:  atorvastatin   80 milliGRAM(s) Oral (02-04-22 @ 21:27)    cinacalcet   60 milliGRAM(s) Oral (02-04-22 @ 11:14)    dextrose 50% Injectable   12.5 milliLiter(s) IV Push (02-05-22 @ 06:39)    insulin glargine Injectable (LANTUS)   6 Unit(s) SubCutaneous (02-04-22 @ 23:10)    insulin lispro (ADMELOG) corrective regimen sliding scale   1 Unit(s) SubCutaneous (02-05-22 @ 00:36)   1 Unit(s) SubCutaneous (02-04-22 @ 12:26)    levothyroxine   25 MICROGram(s) Oral (02-05-22 @ 05:34)

## 2022-02-05 NOTE — PROVIDER CONTACT NOTE (HYPOGLYCEMIA EVENT) - NS PROVIDER CONTACT RECOMMEND-HYPO
Notify provider. Give D50 IVP?   ACP Cindy Rios notified and aware. Give D50 IVP, recheck in 15 mins.

## 2022-02-05 NOTE — PROGRESS NOTE ADULT - ASSESSMENT
Assessment  DMT2: 71y Male with DM T2 with hyperglycemia, A1C 6.4%, was on insulin at home, now on basal insulin and coverage, blood sugars are improving no hypoglycemias on tube feeds.  Hypothyroidism: Patient has no history thyroid disease, was not on any thyroid supplements, subclinical with low-normal FT4, lethargic, on synthroid 25 mcg po daily, repeat TFTs improved and euthyroid.  Hypercalcemia: Started on Sensipar 60mg daily, Ca improving.  CHF: on medications, stable, monitored.  HTN: Controlled,  on antihypertensive medications.  Parkinsons: on meds, monitored.  CKD: Monitor labs/BMP      Dr Ayers  Cell : [phone]

## 2022-02-05 NOTE — PROGRESS NOTE ADULT - SUBJECTIVE AND OBJECTIVE BOX
Patient is a 71y old  Male who presents with a chief complaint of leg swelling (04 Feb 2022 14:08)      SUBJECTIVE / OVERNIGHT EVENTS:    Patient seen and examined. sleeping. arousable. confused. cannot provide much ROS.      Vital Signs Last 24 Hrs  T(C): 36.2 (05 Feb 2022 12:18), Max: 36.5 (04 Feb 2022 16:15)  T(F): 97.2 (05 Feb 2022 12:18), Max: 97.7 (04 Feb 2022 16:15)  HR: 102 (05 Feb 2022 12:18) (72 - 102)  BP: 156/87 (05 Feb 2022 12:18) (127/70 - 160/80)  BP(mean): --  RR: 22 (05 Feb 2022 12:18) (16 - 22)  SpO2: 92% (05 Feb 2022 12:18) (90% - 93%)  I&O's Summary    04 Feb 2022 07:01  -  05 Feb 2022 07:00  --------------------------------------------------------  IN: 330 mL / OUT: 1100 mL / NET: -770 mL        PE:  GENERAL: NAD, AAOx1  HEAD:  Atraumatic, Normocephalic  EYES: conjunctiva and sclera clear  NECK: No JVD  CHEST/LUNG: CTABL, No wheeze  HEART: Regular rate and rhythm; no murmur  ABDOMEN: Soft, Nontender, Nondistended; Bowel sounds present  EXTREMITIES:  2+ Peripheral Pulses, left avf  SKIN: No rashes or lesions. right permacath    LABS:                        9.2    8.65  )-----------( 336      ( 05 Feb 2022 05:17 )             29.1     02-05    132<L>  |  95<L>  |  49<H>  ----------------------------<  82  3.4<L>   |  27  |  2.85<H>    Ca    9.0      05 Feb 2022 05:16  Phos  4.1     02-04        CAPILLARY BLOOD GLUCOSE      POCT Blood Glucose.: 205 mg/dL (05 Feb 2022 12:00)  POCT Blood Glucose.: 117 mg/dL (05 Feb 2022 06:54)  POCT Blood Glucose.: 72 mg/dL (05 Feb 2022 05:44)  POCT Blood Glucose.: 70 mg/dL (05 Feb 2022 05:43)  POCT Blood Glucose.: 192 mg/dL (05 Feb 2022 00:15)  POCT Blood Glucose.: 139 mg/dL (04 Feb 2022 22:56)  POCT Blood Glucose.: 80 mg/dL (04 Feb 2022 20:08)            RADIOLOGY & ADDITIONAL TESTS:    Imaging Personally Reviewed:  [x] YES  [ ] NO    Consultant(s) Notes Reviewed:  [x] YES  [ ] NO    MEDICATIONS  (STANDING):  atorvastatin 80 milliGRAM(s) Oral at bedtime  carbidopa/levodopa  25/100 2.5 Tablet(s) Oral <User Schedule>  carbidopa/levodopa  25/100 2 Tablet(s) Oral <User Schedule>  chlorhexidine 4% Liquid 1 Application(s) Topical <User Schedule>  cinacalcet 60 milliGRAM(s) Oral daily  dextrose 40% Gel 15 Gram(s) Oral once  dextrose 5%. 1000 milliLiter(s) (50 mL/Hr) IV Continuous <Continuous>  dextrose 5%. 1000 milliLiter(s) (100 mL/Hr) IV Continuous <Continuous>  dextrose 50% Injectable 25 Gram(s) IV Push once  dextrose 50% Injectable 12.5 Gram(s) IV Push once  dextrose 50% Injectable 25 Gram(s) IV Push once  dextrose 50% Injectable 12.5 Gram(s) IV Push once  dextrose 50% Injectable 25 Gram(s) IV Push once  diVALproex Sprinkle 250 milliGRAM(s) Oral three times a day  DULoxetine 20 milliGRAM(s) Oral daily  folic acid 1 milliGRAM(s) Oral daily  glucagon  Injectable 1 milliGRAM(s) IntraMuscular once  insulin glargine Injectable (LANTUS) 6 Unit(s) SubCutaneous at bedtime  insulin lispro (ADMELOG) corrective regimen sliding scale   SubCutaneous every 6 hours  levothyroxine 25 MICROGram(s) Oral daily  memantine 5 milliGRAM(s) Oral at bedtime  metoprolol tartrate 50 milliGRAM(s) Oral two times a day  midodrine 10 milliGRAM(s) Oral <User Schedule>  mirtazapine 7.5 milliGRAM(s) Oral daily  Nephro-celeste 1 Tablet(s) Oral daily  pantoprazole   Suspension 40 milliGRAM(s) Enteral Tube two times a day  QUEtiapine 25 milliGRAM(s) Oral at bedtime  tamsulosin 0.4 milliGRAM(s) Oral at bedtime  trihexyphenidyl 2 milliGRAM(s) Oral three times a day    MEDICATIONS  (PRN):  acetaminophen     Tablet .. 650 milliGRAM(s) Oral every 6 hours PRN Mild Pain (1 - 3)  albuterol/ipratropium for Nebulization 3 milliLiter(s) Nebulizer every 6 hours PRN Shortness of Breath and/or Wheezing  diVALproex Sprinkle 125 milliGRAM(s) Oral every 8 hours PRN Agitation  guaiFENesin Oral Liquid (Sugar-Free) 200 milliGRAM(s) Oral every 6 hours PRN Cough  ondansetron Injectable 4 milliGRAM(s) IV Push once PRN Nausea and/or Vomiting  QUEtiapine 12.5 milliGRAM(s) Oral every 6 hours PRN agitation  sodium chloride 0.9% lock flush 10 milliLiter(s) IV Push every 1 hour PRN Pre/post blood products, medications, blood draw, and to maintain line patency      Care Discussed with Consultants/Other Providers [x] YES  [ ] NO    HEALTH ISSUES - PROBLEM Dx:  Acute heart failure    Atrial flutter    DM2 (diabetes mellitus, type 2)    CAD (coronary artery disease)    Parkinsons disease    Acute on chronic renal failure    NSTEMI (non-ST elevation myocardial infarction)    Hypertension    Acute kidney injury superimposed on CKD    Anemia    Hypothyroidism    Delirium    Advanced care planning/counseling discussion    Palliative care status    Palliative care encounter    Pain    Stage 5 chronic kidney disease not on chronic dialysis    Hypercalcemia    Preop cardiovascular exam         Patient is a 71y old  Male who presents with a chief complaint of leg swelling (04 Feb 2022 14:08)      SUBJECTIVE / OVERNIGHT EVENTS:    Patient seen and examined. sleeping. arousable. confused. cannot provide much ROS.      Vital Signs Last 24 Hrs  T(C): 36.2 (05 Feb 2022 12:18), Max: 36.5 (04 Feb 2022 16:15)  T(F): 97.2 (05 Feb 2022 12:18), Max: 97.7 (04 Feb 2022 16:15)  HR: 102 (05 Feb 2022 12:18) (72 - 102)  BP: 156/87 (05 Feb 2022 12:18) (127/70 - 160/80)  BP(mean): --  RR: 22 (05 Feb 2022 12:18) (16 - 22)  SpO2: 92% (05 Feb 2022 12:18) (90% - 93%)  I&O's Summary    04 Feb 2022 07:01  -  05 Feb 2022 07:00  --------------------------------------------------------  IN: 330 mL / OUT: 1100 mL / NET: -770 mL        PE:  GENERAL: NAD, AAOx1  HEAD:  Atraumatic, Normocephalic  EYES: conjunctiva and sclera clear  NECK: No JVD  CHEST/LUNG: CTABL, No wheeze  HEART: Regular rate and rhythm; no murmur  ABDOMEN: Soft, Nontender, Nondistended; Bowel sounds present  EXTREMITIES:  2+ Peripheral Pulses, left avf  SKIN: No rashes or lesions. right central line    LABS:                        9.2    8.65  )-----------( 336      ( 05 Feb 2022 05:17 )             29.1     02-05    132<L>  |  95<L>  |  49<H>  ----------------------------<  82  3.4<L>   |  27  |  2.85<H>    Ca    9.0      05 Feb 2022 05:16  Phos  4.1     02-04        CAPILLARY BLOOD GLUCOSE      POCT Blood Glucose.: 205 mg/dL (05 Feb 2022 12:00)  POCT Blood Glucose.: 117 mg/dL (05 Feb 2022 06:54)  POCT Blood Glucose.: 72 mg/dL (05 Feb 2022 05:44)  POCT Blood Glucose.: 70 mg/dL (05 Feb 2022 05:43)  POCT Blood Glucose.: 192 mg/dL (05 Feb 2022 00:15)  POCT Blood Glucose.: 139 mg/dL (04 Feb 2022 22:56)  POCT Blood Glucose.: 80 mg/dL (04 Feb 2022 20:08)            RADIOLOGY & ADDITIONAL TESTS:    Imaging Personally Reviewed:  [x] YES  [ ] NO    Consultant(s) Notes Reviewed:  [x] YES  [ ] NO    MEDICATIONS  (STANDING):  atorvastatin 80 milliGRAM(s) Oral at bedtime  carbidopa/levodopa  25/100 2.5 Tablet(s) Oral <User Schedule>  carbidopa/levodopa  25/100 2 Tablet(s) Oral <User Schedule>  chlorhexidine 4% Liquid 1 Application(s) Topical <User Schedule>  cinacalcet 60 milliGRAM(s) Oral daily  dextrose 40% Gel 15 Gram(s) Oral once  dextrose 5%. 1000 milliLiter(s) (50 mL/Hr) IV Continuous <Continuous>  dextrose 5%. 1000 milliLiter(s) (100 mL/Hr) IV Continuous <Continuous>  dextrose 50% Injectable 25 Gram(s) IV Push once  dextrose 50% Injectable 12.5 Gram(s) IV Push once  dextrose 50% Injectable 25 Gram(s) IV Push once  dextrose 50% Injectable 12.5 Gram(s) IV Push once  dextrose 50% Injectable 25 Gram(s) IV Push once  diVALproex Sprinkle 250 milliGRAM(s) Oral three times a day  DULoxetine 20 milliGRAM(s) Oral daily  folic acid 1 milliGRAM(s) Oral daily  glucagon  Injectable 1 milliGRAM(s) IntraMuscular once  insulin glargine Injectable (LANTUS) 6 Unit(s) SubCutaneous at bedtime  insulin lispro (ADMELOG) corrective regimen sliding scale   SubCutaneous every 6 hours  levothyroxine 25 MICROGram(s) Oral daily  memantine 5 milliGRAM(s) Oral at bedtime  metoprolol tartrate 50 milliGRAM(s) Oral two times a day  midodrine 10 milliGRAM(s) Oral <User Schedule>  mirtazapine 7.5 milliGRAM(s) Oral daily  Nephro-celeste 1 Tablet(s) Oral daily  pantoprazole   Suspension 40 milliGRAM(s) Enteral Tube two times a day  QUEtiapine 25 milliGRAM(s) Oral at bedtime  tamsulosin 0.4 milliGRAM(s) Oral at bedtime  trihexyphenidyl 2 milliGRAM(s) Oral three times a day    MEDICATIONS  (PRN):  acetaminophen     Tablet .. 650 milliGRAM(s) Oral every 6 hours PRN Mild Pain (1 - 3)  albuterol/ipratropium for Nebulization 3 milliLiter(s) Nebulizer every 6 hours PRN Shortness of Breath and/or Wheezing  diVALproex Sprinkle 125 milliGRAM(s) Oral every 8 hours PRN Agitation  guaiFENesin Oral Liquid (Sugar-Free) 200 milliGRAM(s) Oral every 6 hours PRN Cough  ondansetron Injectable 4 milliGRAM(s) IV Push once PRN Nausea and/or Vomiting  QUEtiapine 12.5 milliGRAM(s) Oral every 6 hours PRN agitation  sodium chloride 0.9% lock flush 10 milliLiter(s) IV Push every 1 hour PRN Pre/post blood products, medications, blood draw, and to maintain line patency      Care Discussed with Consultants/Other Providers [x] YES  [ ] NO    HEALTH ISSUES - PROBLEM Dx:  Acute heart failure    Atrial flutter    DM2 (diabetes mellitus, type 2)    CAD (coronary artery disease)    Parkinsons disease    Acute on chronic renal failure    NSTEMI (non-ST elevation myocardial infarction)    Hypertension    Acute kidney injury superimposed on CKD    Anemia    Hypothyroidism    Delirium    Advanced care planning/counseling discussion    Palliative care status    Palliative care encounter    Pain    Stage 5 chronic kidney disease not on chronic dialysis    Hypercalcemia    Preop cardiovascular exam

## 2022-02-06 NOTE — PROGRESS NOTE ADULT - SUBJECTIVE AND OBJECTIVE BOX
Chief complaint  Patient is a 71y old  Male who presents with a chief complaint of leg swelling (06 Feb 2022 13:39)   Review of systems  Patient in bed, looks comfortable, no hypoglycemic episodes.    Labs and Fingersticks  CAPILLARY BLOOD GLUCOSE      POCT Blood Glucose.: 188 mg/dL (06 Feb 2022 12:21)  POCT Blood Glucose.: 141 mg/dL (06 Feb 2022 05:56)  POCT Blood Glucose.: 186 mg/dL (05 Feb 2022 23:50)  POCT Blood Glucose.: 137 mg/dL (05 Feb 2022 16:55)      Anion Gap, Serum: 13 (02-06 @ 06:12)  Anion Gap, Serum: 10 (02-05 @ 05:16)      Calcium, Total Serum: 9.2 (02-06 @ 06:12)  Calcium, Total Serum: 9.0 (02-05 @ 05:16)          02-06    132<L>  |  94<L>  |  62<H>  ----------------------------<  167<H>  4.3   |  25  |  3.37<H>    Ca    9.2      06 Feb 2022 06:12  Phos  2.5     02-06  Mg     2.2     02-06                          9.6    8.57  )-----------( 327      ( 06 Feb 2022 06:16 )             31.9     Medications  MEDICATIONS  (STANDING):  atorvastatin 80 milliGRAM(s) Oral at bedtime  carbidopa/levodopa  25/100 2.5 Tablet(s) Oral <User Schedule>  carbidopa/levodopa  25/100 2 Tablet(s) Oral <User Schedule>  chlorhexidine 4% Liquid 1 Application(s) Topical <User Schedule>  cinacalcet 60 milliGRAM(s) Oral daily  dextrose 40% Gel 15 Gram(s) Oral once  dextrose 5%. 1000 milliLiter(s) (50 mL/Hr) IV Continuous <Continuous>  dextrose 5%. 1000 milliLiter(s) (100 mL/Hr) IV Continuous <Continuous>  dextrose 50% Injectable 25 Gram(s) IV Push once  dextrose 50% Injectable 12.5 Gram(s) IV Push once  dextrose 50% Injectable 25 Gram(s) IV Push once  dextrose 50% Injectable 25 Gram(s) IV Push once  dextrose 50% Injectable 12.5 Gram(s) IV Push once  diVALproex Sprinkle 250 milliGRAM(s) Oral three times a day  DULoxetine 20 milliGRAM(s) Oral daily  folic acid 1 milliGRAM(s) Oral daily  glucagon  Injectable 1 milliGRAM(s) IntraMuscular once  insulin glargine Injectable (LANTUS) 6 Unit(s) SubCutaneous at bedtime  insulin lispro (ADMELOG) corrective regimen sliding scale   SubCutaneous every 6 hours  levothyroxine 25 MICROGram(s) Oral daily  memantine 5 milliGRAM(s) Oral at bedtime  metoprolol tartrate 50 milliGRAM(s) Oral two times a day  midodrine 10 milliGRAM(s) Oral <User Schedule>  mirtazapine 7.5 milliGRAM(s) Oral daily  Nephro-celeste 1 Tablet(s) Oral daily  pantoprazole   Suspension 40 milliGRAM(s) Enteral Tube two times a day  QUEtiapine 25 milliGRAM(s) Oral at bedtime  tamsulosin 0.4 milliGRAM(s) Oral at bedtime  trihexyphenidyl 2 milliGRAM(s) Oral three times a day      Physical Exam  General: Patient comfortable in bed  Vital Signs Last 12 Hrs  T(F): 98.1 (02-06-22 @ 11:27), Max: 98.1 (02-06-22 @ 11:27)  HR: 61 (02-06-22 @ 11:27) (61 - 88)  BP: 135/75 (02-06-22 @ 11:27) (135/75 - 150/80)  BP(mean): --  RR: 20 (02-06-22 @ 11:27) (20 - 20)  SpO2: 95% (02-06-22 @ 11:27) (95% - 95%)           Chief complaint  Patient is a 71y old  Male who presents with a chief complaint of leg swelling (06 Feb 2022 13:39)   Review of systems  Patient in bed, looks comfortable, no hypoglycemic episodes.    Labs and Fingersticks  CAPILLARY BLOOD GLUCOSE      POCT Blood Glucose.: 188 mg/dL (06 Feb 2022 12:21)  POCT Blood Glucose.: 141 mg/dL (06 Feb 2022 05:56)  POCT Blood Glucose.: 186 mg/dL (05 Feb 2022 23:50)  POCT Blood Glucose.: 137 mg/dL (05 Feb 2022 16:55)      Anion Gap, Serum: 13 (02-06 @ 06:12)  Anion Gap, Serum: 10 (02-05 @ 05:16)      Calcium, Total Serum: 9.2 (02-06 @ 06:12)  Calcium, Total Serum: 9.0 (02-05 @ 05:16)          02-06    132<L>  |  94<L>  |  62<H>  ----------------------------<  167<H>  4.3   |  25  |  3.37<H>    Ca    9.2      06 Feb 2022 06:12  Phos  2.5     02-06  Mg     2.2     02-06                          9.6    8.57  )-----------( 327      ( 06 Feb 2022 06:16 )             31.9     Medications  MEDICATIONS  (STANDING):  atorvastatin 80 milliGRAM(s) Oral at bedtime  carbidopa/levodopa  25/100 2.5 Tablet(s) Oral <User Schedule>  carbidopa/levodopa  25/100 2 Tablet(s) Oral <User Schedule>  chlorhexidine 4% Liquid 1 Application(s) Topical <User Schedule>  cinacalcet 60 milliGRAM(s) Oral daily  dextrose 40% Gel 15 Gram(s) Oral once  dextrose 5%. 1000 milliLiter(s) (50 mL/Hr) IV Continuous <Continuous>  dextrose 5%. 1000 milliLiter(s) (100 mL/Hr) IV Continuous <Continuous>  dextrose 50% Injectable 25 Gram(s) IV Push once  dextrose 50% Injectable 12.5 Gram(s) IV Push once  dextrose 50% Injectable 25 Gram(s) IV Push once  dextrose 50% Injectable 25 Gram(s) IV Push once  dextrose 50% Injectable 12.5 Gram(s) IV Push once  diVALproex Sprinkle 250 milliGRAM(s) Oral three times a day  DULoxetine 20 milliGRAM(s) Oral daily  folic acid 1 milliGRAM(s) Oral daily  glucagon  Injectable 1 milliGRAM(s) IntraMuscular once  insulin glargine Injectable (LANTUS) 6 Unit(s) SubCutaneous at bedtime  insulin lispro (ADMELOG) corrective regimen sliding scale   SubCutaneous every 6 hours  levothyroxine 25 MICROGram(s) Oral daily  memantine 5 milliGRAM(s) Oral at bedtime  metoprolol tartrate 50 milliGRAM(s) Oral two times a day  midodrine 10 milliGRAM(s) Oral <User Schedule>  mirtazapine 7.5 milliGRAM(s) Oral daily  Nephro-celeste 1 Tablet(s) Oral daily  pantoprazole   Suspension 40 milliGRAM(s) Enteral Tube two times a day  QUEtiapine 25 milliGRAM(s) Oral at bedtime  tamsulosin 0.4 milliGRAM(s) Oral at bedtime  trihexyphenidyl 2 milliGRAM(s) Oral three times a day      Physical Exam  General: Patient comfortable in bed  Vital Signs Last 12 Hrs  T(F): 98.1 (02-06-22 @ 11:27), Max: 98.1 (02-06-22 @ 11:27)  HR: 61 (02-06-22 @ 11:27) (61 - 88)  BP: 135/75 (02-06-22 @ 11:27) (135/75 - 150/80)  BP(mean): --  RR: 20 (02-06-22 @ 11:27) (20 - 20)  SpO2: 95% (02-06-22 @ 11:27) (95% - 95%)

## 2022-02-06 NOTE — CHART NOTE - NSCHARTNOTEFT_GEN_A_CORE
Notified by RN that patient pulled PEG tube out of abdominal wall despite abdominal binder.   Patient seen and assessed at bedside.   Tract appears open. Site without bleeding.   - 16F johnson replaced in tract to keep open.  - Will reorder b/l mittens placed. Abdominal binder still in place.    - IR Consult for G tube replacement.   - Maintain enhanced supervision.     Discussed with Dr. León.     TESSA MontejoC  Dept of Medicine   [id number]

## 2022-02-06 NOTE — PROGRESS NOTE ADULT - ASSESSMENT
Assessment  DMT2: 71y Male with DM T2 with hyperglycemia, A1C 6.4%, was on insulin at home, now on basal insulin and coverage, blood sugars in overall acceptable range, no hypoglycemias. Patient on tube feeds, lethargic, planning catheter placement Tuesday.  Hypothyroidism: Patient has no history thyroid disease, was not on any thyroid supplements, subclinical with low-normal FT4, lethargic, on synthroid 25 mcg po daily, repeat TFTs improved and euthyroid.  Hypercalcemia: Started on Sensipar 60mg daily, Ca improving.  CHF: on medications, stable, monitored.  HTN: Controlled,  on antihypertensive medications.  Parkinsons: on meds, monitored.  CKD: Monitor labs/BMP      Dr Ayers  Cell : [phone]   Assessment  DMT2: 71y Male with DM T2 with hyperglycemia, A1C 6.4%, was on insulin at home, now on basal insulin and coverage, blood sugars in overall acceptable range, no hypoglycemias. Patient on tube feeds, lethargic, planning catheter placement Tuesday.  Hypothyroidism: Patient has no history thyroid disease, was not on any thyroid supplements, subclinical with low-normal FT4, lethargic, on synthroid 25 mcg po daily, repeat TFTs improved and euthyroid.  Hypercalcemia: Started on Sensipar 60mg daily, Ca improving.  CHF: on medications, stable, monitored.  HTN: Controlled,  on antihypertensive medications.  Parkinsons: on meds, monitored.  CKD: Monitor labs/BMP

## 2022-02-06 NOTE — PROGRESS NOTE ADULT - ASSESSMENT
72yo M w/ PMHx of CAD (s/p CABG 2019), CKD (unknown stage), DM2, Parkinson's Disease, HTN, depression presents with new onset acute heart failure exacerbation, NSTEMI, started on hep gtt, developed bl RP bleed, acute anemia. sp MICU course for hemorrhagic shock, 10/28 sp IR embolization l4 and l5 lumbar artery. sp permacath and AVF.    # chronic systolic and diastolic heart failure  # ESRD, new HD  # Atrial flutter  # Parkinson's disease   # delirium  # CAD (coronary artery disease)  # HTN  # DM2 (diabetes mellitus, type 2)  # FTT sp PEG  midodrine during dialysis  sp L AVF using for HD, permacath dced  1/29 malfunctioning Lt AVF - 2/2 sp fistulogram and angioplasty by IR - nonfunctioning  pending permacath, sp shiley 2/4  insulin per endo  lopressor, atorvastatin  peg tube for tube feeds, abd binder  flomax    DVT ppx hsq    DNR/DNI    dc planning after permcath placed    [facility]  [phone]

## 2022-02-06 NOTE — CHART NOTE - NSCHARTNOTEFT_GEN_A_CORE
PEG displaced. Placed by GI on 1/10. 16 Fr Javier placed according to notes. Will plan for G-tube replacement Tuesday at same time as tunneled HD catheter.    Would inject contrast via Javier and obtain x-ray to confirm positioning.

## 2022-02-06 NOTE — PROGRESS NOTE ADULT - SUBJECTIVE AND OBJECTIVE BOX
Patient is a 71y old  Male who presents with a chief complaint of leg swelling (06 Feb 2022 11:36)      SUBJECTIVE / OVERNIGHT EVENTS:    Patient seen and examined. lethargic but arousable.       Vital Signs Last 24 Hrs  T(C): 36.7 (06 Feb 2022 11:27), Max: 36.9 (05 Feb 2022 20:27)  T(F): 98.1 (06 Feb 2022 11:27), Max: 98.5 (05 Feb 2022 20:27)  HR: 61 (06 Feb 2022 11:27) (61 - 107)  BP: 135/75 (06 Feb 2022 11:27) (135/75 - 151/95)  BP(mean): --  RR: 20 (06 Feb 2022 11:27) (20 - 20)  SpO2: 95% (06 Feb 2022 11:27) (92% - 95%)  I&O's Summary    05 Feb 2022 07:01  -  06 Feb 2022 07:00  --------------------------------------------------------  IN: 1980 mL / OUT: 650 mL / NET: 1330 mL        PE:  GENERAL: NAD, AAOx1  HEAD:  Atraumatic, Normocephalic  EYES: conjunctiva and sclera clear  NECK: No JVD  CHEST/LUNG: CTABL, No wheeze  HEART: Regular rate and rhythm; no murmur  ABDOMEN: Soft, Nontender, Nondistended; Bowel sounds present  EXTREMITIES:  2+ Peripheral Pulses, left avf  SKIN: No rashes or lesions. right chest [facility]      LABS:                        9.6    8.57  )-----------( 327      ( 06 Feb 2022 06:16 )             31.9     02-06    132<L>  |  94<L>  |  62<H>  ----------------------------<  167<H>  4.3   |  25  |  3.37<H>    Ca    9.2      06 Feb 2022 06:12  Phos  2.5     02-06  Mg     2.2     02-06        CAPILLARY BLOOD GLUCOSE      POCT Blood Glucose.: 188 mg/dL (06 Feb 2022 12:21)  POCT Blood Glucose.: 141 mg/dL (06 Feb 2022 05:56)  POCT Blood Glucose.: 186 mg/dL (05 Feb 2022 23:50)  POCT Blood Glucose.: 137 mg/dL (05 Feb 2022 16:55)            RADIOLOGY & ADDITIONAL TESTS:    Imaging Personally Reviewed:  [x] YES  [ ] NO    Consultant(s) Notes Reviewed:  [x] YES  [ ] NO    MEDICATIONS  (STANDING):  atorvastatin 80 milliGRAM(s) Oral at bedtime  carbidopa/levodopa  25/100 2.5 Tablet(s) Oral <User Schedule>  carbidopa/levodopa  25/100 2 Tablet(s) Oral <User Schedule>  chlorhexidine 4% Liquid 1 Application(s) Topical <User Schedule>  cinacalcet 60 milliGRAM(s) Oral daily  dextrose 40% Gel 15 Gram(s) Oral once  dextrose 5%. 1000 milliLiter(s) (50 mL/Hr) IV Continuous <Continuous>  dextrose 5%. 1000 milliLiter(s) (100 mL/Hr) IV Continuous <Continuous>  dextrose 50% Injectable 25 Gram(s) IV Push once  dextrose 50% Injectable 12.5 Gram(s) IV Push once  dextrose 50% Injectable 25 Gram(s) IV Push once  dextrose 50% Injectable 12.5 Gram(s) IV Push once  dextrose 50% Injectable 25 Gram(s) IV Push once  diVALproex Sprinkle 250 milliGRAM(s) Oral three times a day  DULoxetine 20 milliGRAM(s) Oral daily  folic acid 1 milliGRAM(s) Oral daily  glucagon  Injectable 1 milliGRAM(s) IntraMuscular once  insulin glargine Injectable (LANTUS) 6 Unit(s) SubCutaneous at bedtime  insulin lispro (ADMELOG) corrective regimen sliding scale   SubCutaneous every 6 hours  levothyroxine 25 MICROGram(s) Oral daily  memantine 5 milliGRAM(s) Oral at bedtime  metoprolol tartrate 50 milliGRAM(s) Oral two times a day  midodrine 10 milliGRAM(s) Oral <User Schedule>  mirtazapine 7.5 milliGRAM(s) Oral daily  Nephro-celeste 1 Tablet(s) Oral daily  pantoprazole   Suspension 40 milliGRAM(s) Enteral Tube two times a day  QUEtiapine 25 milliGRAM(s) Oral at bedtime  tamsulosin 0.4 milliGRAM(s) Oral at bedtime  trihexyphenidyl 2 milliGRAM(s) Oral three times a day    MEDICATIONS  (PRN):  acetaminophen     Tablet .. 650 milliGRAM(s) Oral every 6 hours PRN Mild Pain (1 - 3)  albuterol/ipratropium for Nebulization 3 milliLiter(s) Nebulizer every 6 hours PRN Shortness of Breath and/or Wheezing  diVALproex Sprinkle 125 milliGRAM(s) Oral every 8 hours PRN Agitation  guaiFENesin Oral Liquid (Sugar-Free) 200 milliGRAM(s) Oral every 6 hours PRN Cough  ondansetron Injectable 4 milliGRAM(s) IV Push once PRN Nausea and/or Vomiting  QUEtiapine 12.5 milliGRAM(s) Oral every 6 hours PRN agitation  sodium chloride 0.9% lock flush 10 milliLiter(s) IV Push every 1 hour PRN Pre/post blood products, medications, blood draw, and to maintain line patency      Care Discussed with Consultants/Other Providers [x] YES  [ ] NO    HEALTH ISSUES - PROBLEM Dx:  Acute heart failure    Atrial flutter    DM2 (diabetes mellitus, type 2)    CAD (coronary artery disease)    Parkinsons disease    Acute on chronic renal failure    NSTEMI (non-ST elevation myocardial infarction)    Hypertension    Acute kidney injury superimposed on CKD    Anemia    Hypothyroidism    Delirium    Advanced care planning/counseling discussion    Palliative care status    Palliative care encounter    Pain    Stage 5 chronic kidney disease not on chronic dialysis    Hypercalcemia    Preop cardiovascular exam

## 2022-02-06 NOTE — PROGRESS NOTE ADULT - SUBJECTIVE AND OBJECTIVE BOX
Vascular & Interventional Radiology    Interval history: s/p non-tunneled HD catheter insertion 2/4. Doing well.     Allergies: adhesives (Rash)  latex (Urticaria)    Data:  T(C): 36.7  HR: 61  BP: 135/75  RR: 20  SpO2: 95%    -WBC 8.57 / HgB 9.6 / Hct 31.9 / Plt 327  -Na 132 / Cl 94 / BUN 62 / Glucose 167  -K 4.3 / CO2 25 / Cr 3.37  -INR1.05    Assessment:  71y Male w/ ESRD requiring prolonged HD.    Plan:   - Will plan for tunneled HD catheter placement Tuesday.  - NPO for procedure.  - Place IR procedure order under Dr. Thorpe.

## 2022-02-07 NOTE — PROGRESS NOTE ADULT - ASSESSMENT
70yo M w/ PMHx of CAD (s/p CABG 2019), CKD (unknown stage), DM2, Parkinson's Disease, HTN, depression presents with new onset acute heart failure exacerbation, NSTEMI, started on hep gtt, developed bl RP bleed, acute anemia. sp MICU course for hemorrhagic shock, 10/28 sp IR embolization l4 and l5 lumbar artery. sp permacath and AVF.    # chronic systolic and diastolic heart failure  # ESRD, new HD  # Atrial flutter  # Parkinson's disease   # delirium  # CAD (coronary artery disease)  # HTN  # DM2 (diabetes mellitus, type 2)  # FTT sp PEG  midodrine during dialysis  sp L AVF using for HD, permacath dced  1/29 malfunctioning Lt AVF - 2/2 sp fistulogram and angioplasty by IR - nonfunctioning  pending permacath, sp shiley 2/4  insulin per endo  lopressor, atorvastatin  peg tube for tube feeds, abd binder  flomax    DVT ppx hsq    DNR/DNI    dc planning after permcath placed    [facility]  [phone] 70yo M w/ PMHx of CAD (s/p CABG 2019), CKD (unknown stage), DM2, Parkinson's Disease, HTN, depression presents with new onset acute heart failure exacerbation, NSTEMI, started on hep gtt, developed bl RP bleed, acute anemia. sp MICU course for hemorrhagic shock, 10/28 sp IR embolization l4 and l5 lumbar artery. sp permacath and AVF.    # chronic systolic and diastolic heart failure  # ESRD, new HD  # Atrial flutter  # Parkinson's disease   # delirium  # CAD (coronary artery disease)  # HTN  # DM2 (diabetes mellitus, type 2)  # FTT sp PEG  midodrine during dialysis  sp L AVF using for HD, permacath dced  1/29 malfunctioning Lt AVF - 2/2 sp fistulogram and angioplasty by IR - nonfunctioning  sp shiley 2/4  insulin per endo  lopressor, atorvastatin  2/6 pt pulled out peg tube  2/7 IR for permacath and PEG re placement    DVT ppx hsq    DNR/DNI    updated son HCP Branden over the phone today  - events overnight relayed, confirmed family still want all aggressive measures and to cont dialysis    [facility]  [phone] 72yo M w/ PMHx of CAD (s/p CABG 2019), CKD (unknown stage), DM2, Parkinson's Disease, HTN, depression presents with new onset acute heart failure exacerbation, NSTEMI, started on hep gtt, developed bl RP bleed, acute anemia. sp MICU course for hemorrhagic shock, 10/28 sp IR embolization l4 and l5 lumbar artery. sp permacath and AVF.    # chronic systolic and diastolic heart failure  # ESRD, new HD  # Atrial flutter  # Parkinson's disease   # delirium  # CAD (coronary artery disease)  # HTN  # DM2 (diabetes mellitus, type 2)  # FTT sp PEG  midodrine during dialysis  sp L AVF using for HD, permacath dced  1/29 malfunctioning Lt AVF - 2/2 sp fistulogram and angioplasty by IR - nonfunctioning  sp shiley 2/4  insulin per endo  afib rvr as missed PO medication, cont lopressor, atorvastatin - can give by mouth now that no PEG, PEG not for dysphagia   2/6 pt pulled out peg tube  2/7 IR for permacath and PEG re placement    DVT ppx hsq    DNR/DNI    updated son HCP Branden over the phone today  - events overnight relayed, confirmed family still want all aggressive measures and to cont dialysis    [facility]

## 2022-02-07 NOTE — PROGRESS NOTE ADULT - SUBJECTIVE AND OBJECTIVE BOX
Patient is a 71y old  Male who presents with a chief complaint of leg swelling (06 Feb 2022 14:29)      SUBJECTIVE / OVERNIGHT EVENTS:    Patient seen and examined.       Vital Signs Last 24 Hrs  T(C): 36.4 (07 Feb 2022 10:51), Max: 36.7 (06 Feb 2022 11:27)  T(F): 97.6 (07 Feb 2022 10:51), Max: 98.1 (06 Feb 2022 11:27)  HR: 116 (07 Feb 2022 10:51) (61 - 121)  BP: 146/95 (07 Feb 2022 10:51) (135/75 - 150/80)  BP(mean): --  RR: 19 (07 Feb 2022 10:51) (19 - 20)  SpO2: 94% (07 Feb 2022 10:51) (94% - 95%)  I&O's Summary    06 Feb 2022 07:01  -  07 Feb 2022 07:00  --------------------------------------------------------  IN: 0 mL / OUT: 500 mL / NET: -500 mL    07 Feb 2022 07:01  -  07 Feb 2022 10:52  --------------------------------------------------------  IN: 0 mL / OUT: 0 mL / NET: 0 mL        PE:  GENERAL: NAD, AAOx3  HEAD:  Atraumatic, Normocephalic  EYES: EOMI, PERRLA, conjunctiva and sclera clear  NECK: Supple, No JVD  CHEST/LUNG: CTABL, No wheeze  HEART: Regular rate and rhythm; + murmur  ABDOMEN: Soft, Nontender, Nondistended; Bowel sounds present  EXTREMITIES:  2+ Peripheral Pulses, No clubbing, cyanosis, or edema  SKIN: No rashes or lesions  NEURO: No focal deficits    LABS:                        9.6    10.88 )-----------( 326      ( 07 Feb 2022 06:20 )             31.1     02-07    132<L>  |  93<L>  |  74<H>  ----------------------------<  122<H>  4.6   |  24  |  3.87<H>    Ca    9.3      07 Feb 2022 06:19  Phos  2.5     02-06  Mg     2.2     02-06    TPro  8.2  /  Alb  2.5<L>  /  TBili  0.4  /  DBili  x   /  AST  16  /  ALT  <5<L>  /  AlkPhos  92  02-07      CAPILLARY BLOOD GLUCOSE      POCT Blood Glucose.: 137 mg/dL (07 Feb 2022 06:10)  POCT Blood Glucose.: 139 mg/dL (07 Feb 2022 00:11)  POCT Blood Glucose.: 113 mg/dL (06 Feb 2022 18:36)  POCT Blood Glucose.: 188 mg/dL (06 Feb 2022 12:21)            RADIOLOGY & ADDITIONAL TESTS:    Imaging Personally Reviewed:  [x] YES  [ ] NO    Consultant(s) Notes Reviewed:  [x] YES  [ ] NO    MEDICATIONS  (STANDING):  atorvastatin 80 milliGRAM(s) Oral at bedtime  carbidopa/levodopa  25/100 2.5 Tablet(s) Oral <User Schedule>  carbidopa/levodopa  25/100 2 Tablet(s) Oral <User Schedule>  chlorhexidine 4% Liquid 1 Application(s) Topical <User Schedule>  cinacalcet 60 milliGRAM(s) Oral daily  dextrose 40% Gel 15 Gram(s) Oral once  dextrose 5%. 1000 milliLiter(s) (50 mL/Hr) IV Continuous <Continuous>  dextrose 5%. 1000 milliLiter(s) (100 mL/Hr) IV Continuous <Continuous>  dextrose 50% Injectable 25 Gram(s) IV Push once  dextrose 50% Injectable 12.5 Gram(s) IV Push once  dextrose 50% Injectable 25 Gram(s) IV Push once  dextrose 50% Injectable 25 Gram(s) IV Push once  dextrose 50% Injectable 12.5 Gram(s) IV Push once  diVALproex Sprinkle 250 milliGRAM(s) Oral three times a day  DULoxetine 20 milliGRAM(s) Oral daily  folic acid 1 milliGRAM(s) Oral daily  glucagon  Injectable 1 milliGRAM(s) IntraMuscular once  insulin glargine Injectable (LANTUS) 6 Unit(s) SubCutaneous at bedtime  insulin lispro (ADMELOG) corrective regimen sliding scale   SubCutaneous every 6 hours  levothyroxine 25 MICROGram(s) Oral daily  memantine 5 milliGRAM(s) Oral at bedtime  metoprolol tartrate Injectable 5 milliGRAM(s) IV Push every 6 hours  midodrine 10 milliGRAM(s) Oral <User Schedule>  mirtazapine 7.5 milliGRAM(s) Oral daily  Nephro-celeste 1 Tablet(s) Oral daily  pantoprazole   Suspension 40 milliGRAM(s) Enteral Tube two times a day  QUEtiapine 25 milliGRAM(s) Oral at bedtime  tamsulosin 0.4 milliGRAM(s) Oral at bedtime  trihexyphenidyl 2 milliGRAM(s) Oral three times a day    MEDICATIONS  (PRN):  acetaminophen     Tablet .. 650 milliGRAM(s) Oral every 6 hours PRN Mild Pain (1 - 3)  albuterol/ipratropium for Nebulization 3 milliLiter(s) Nebulizer every 6 hours PRN Shortness of Breath and/or Wheezing  diVALproex Sprinkle 125 milliGRAM(s) Oral every 8 hours PRN Agitation  guaiFENesin Oral Liquid (Sugar-Free) 200 milliGRAM(s) Oral every 6 hours PRN Cough  ondansetron Injectable 4 milliGRAM(s) IV Push once PRN Nausea and/or Vomiting  QUEtiapine 12.5 milliGRAM(s) Oral every 6 hours PRN agitation  sodium chloride 0.9% lock flush 10 milliLiter(s) IV Push every 1 hour PRN Pre/post blood products, medications, blood draw, and to maintain line patency      Care Discussed with Consultants/Other Providers [x] YES  [ ] NO    HEALTH ISSUES - PROBLEM Dx:  Acute heart failure    Atrial flutter    DM2 (diabetes mellitus, type 2)    CAD (coronary artery disease)    Parkinsons disease    Acute on chronic renal failure    NSTEMI (non-ST elevation myocardial infarction)    Hypertension    Acute kidney injury superimposed on CKD    Anemia    Hypothyroidism    Delirium    Advanced care planning/counseling discussion    Palliative care status    Palliative care encounter    Pain    Stage 5 chronic kidney disease not on chronic dialysis    Hypercalcemia    Preop cardiovascular exam         Patient is a 71y old  Male who presents with a chief complaint of leg swelling (06 Feb 2022 14:29)      SUBJECTIVE / OVERNIGHT EVENTS:    Patient seen and examined. pulled out peg tube overnight.       Vital Signs Last 24 Hrs  T(C): 36.4 (07 Feb 2022 10:51), Max: 36.7 (06 Feb 2022 11:27)  T(F): 97.6 (07 Feb 2022 10:51), Max: 98.1 (06 Feb 2022 11:27)  HR: 116 (07 Feb 2022 10:51) (61 - 121)  BP: 146/95 (07 Feb 2022 10:51) (135/75 - 150/80)  BP(mean): --  RR: 19 (07 Feb 2022 10:51) (19 - 20)  SpO2: 94% (07 Feb 2022 10:51) (94% - 95%)  I&O's Summary    06 Feb 2022 07:01  -  07 Feb 2022 07:00  --------------------------------------------------------  IN: 0 mL / OUT: 500 mL / NET: -500 mL    07 Feb 2022 07:01  -  07 Feb 2022 10:52  --------------------------------------------------------  IN: 0 mL / OUT: 0 mL / NET: 0 mL        PE:  GENERAL: NAD, AAOx1  HEAD:  Atraumatic, Normocephalic  EYES: conjunctiva and sclera clear  NECK: No JVD  CHEST/LUNG: CTABL, No wheeze  HEART: Regular rate and rhythm; no murmur  ABDOMEN: Soft, Nontender, Nondistended; Bowel sounds present, johnson catheter in place of PEG  EXTREMITIES:  2+ Peripheral Pulses, left avf  SKIN: No rashes or lesions. right chest shiley    LABS:                        9.6    10.88 )-----------( 326      ( 07 Feb 2022 06:20 )             31.1     02-07    132<L>  |  93<L>  |  74<H>  ----------------------------<  122<H>  4.6   |  24  |  3.87<H>    Ca    9.3      07 Feb 2022 06:19  Phos  2.5     02-06  Mg     2.2     02-06    TPro  8.2  /  Alb  2.5<L>  /  TBili  0.4  /  DBili  x   /  AST  16  /  ALT  <5<L>  /  AlkPhos  92  02-07      CAPILLARY BLOOD GLUCOSE      POCT Blood Glucose.: 137 mg/dL (07 Feb 2022 06:10)  POCT Blood Glucose.: 139 mg/dL (07 Feb 2022 00:11)  POCT Blood Glucose.: 113 mg/dL (06 Feb 2022 18:36)  POCT Blood Glucose.: 188 mg/dL (06 Feb 2022 12:21)            RADIOLOGY & ADDITIONAL TESTS:    Imaging Personally Reviewed:  [x] YES  [ ] NO    Consultant(s) Notes Reviewed:  [x] YES  [ ] NO    MEDICATIONS  (STANDING):  atorvastatin 80 milliGRAM(s) Oral at bedtime  carbidopa/levodopa  25/100 2.5 Tablet(s) Oral <User Schedule>  carbidopa/levodopa  25/100 2 Tablet(s) Oral <User Schedule>  chlorhexidine 4% Liquid 1 Application(s) Topical <User Schedule>  cinacalcet 60 milliGRAM(s) Oral daily  dextrose 40% Gel 15 Gram(s) Oral once  dextrose 5%. 1000 milliLiter(s) (50 mL/Hr) IV Continuous <Continuous>  dextrose 5%. 1000 milliLiter(s) (100 mL/Hr) IV Continuous <Continuous>  dextrose 50% Injectable 25 Gram(s) IV Push once  dextrose 50% Injectable 12.5 Gram(s) IV Push once  dextrose 50% Injectable 25 Gram(s) IV Push once  dextrose 50% Injectable 25 Gram(s) IV Push once  dextrose 50% Injectable 12.5 Gram(s) IV Push once  diVALproex Sprinkle 250 milliGRAM(s) Oral three times a day  DULoxetine 20 milliGRAM(s) Oral daily  folic acid 1 milliGRAM(s) Oral daily  glucagon  Injectable 1 milliGRAM(s) IntraMuscular once  insulin glargine Injectable (LANTUS) 6 Unit(s) SubCutaneous at bedtime  insulin lispro (ADMELOG) corrective regimen sliding scale   SubCutaneous every 6 hours  levothyroxine 25 MICROGram(s) Oral daily  memantine 5 milliGRAM(s) Oral at bedtime  metoprolol tartrate Injectable 5 milliGRAM(s) IV Push every 6 hours  midodrine 10 milliGRAM(s) Oral <User Schedule>  mirtazapine 7.5 milliGRAM(s) Oral daily  Nephro-celeste 1 Tablet(s) Oral daily  pantoprazole   Suspension 40 milliGRAM(s) Enteral Tube two times a day  QUEtiapine 25 milliGRAM(s) Oral at bedtime  tamsulosin 0.4 milliGRAM(s) Oral at bedtime  trihexyphenidyl 2 milliGRAM(s) Oral three times a day    MEDICATIONS  (PRN):  acetaminophen     Tablet .. 650 milliGRAM(s) Oral every 6 hours PRN Mild Pain (1 - 3)  albuterol/ipratropium for Nebulization 3 milliLiter(s) Nebulizer every 6 hours PRN Shortness of Breath and/or Wheezing  diVALproex Sprinkle 125 milliGRAM(s) Oral every 8 hours PRN Agitation  guaiFENesin Oral Liquid (Sugar-Free) 200 milliGRAM(s) Oral every 6 hours PRN Cough  ondansetron Injectable 4 milliGRAM(s) IV Push once PRN Nausea and/or Vomiting  QUEtiapine 12.5 milliGRAM(s) Oral every 6 hours PRN agitation  sodium chloride 0.9% lock flush 10 milliLiter(s) IV Push every 1 hour PRN Pre/post blood products, medications, blood draw, and to maintain line patency      Care Discussed with Consultants/Other Providers [x] YES  [ ] NO    HEALTH ISSUES - PROBLEM Dx:  Acute heart failure    Atrial flutter    DM2 (diabetes mellitus, type 2)    CAD (coronary artery disease)    Parkinsons disease    Acute on chronic renal failure    NSTEMI (non-ST elevation myocardial infarction)    Hypertension    Acute kidney injury superimposed on CKD    Anemia    Hypothyroidism    Delirium    Advanced care planning/counseling discussion    Palliative care status    Palliative care encounter    Pain    Stage 5 chronic kidney disease not on chronic dialysis    Hypercalcemia    Preop cardiovascular exam

## 2022-02-07 NOTE — PROGRESS NOTE ADULT - SUBJECTIVE AND OBJECTIVE BOX
Chief complaint  Patient is a 71y old  Male who presents with a chief complaint of leg swelling (07 Feb 2022 13:13)   Review of systems  Events noted, patient pulled out PEG, no hypoglycemic episodes.    Labs and Fingersticks  CAPILLARY BLOOD GLUCOSE      POCT Blood Glucose.: 99 mg/dL (07 Feb 2022 11:40)  POCT Blood Glucose.: 137 mg/dL (07 Feb 2022 06:10)  POCT Blood Glucose.: 139 mg/dL (07 Feb 2022 00:11)  POCT Blood Glucose.: 113 mg/dL (06 Feb 2022 18:36)      Anion Gap, Serum: 15 (02-07 @ 06:19)  Anion Gap, Serum: 13 (02-06 @ 06:12)      Calcium, Total Serum: 9.3 (02-07 @ 06:19)  Calcium, Total Serum: 9.2 (02-06 @ 06:12)  Albumin, Serum: 2.5 *L* (02-07 @ 06:19)    Alanine Aminotransferase (ALT/SGPT): <5 *L* (02-07 @ 06:19)  Alkaline Phosphatase, Serum: 92 (02-07 @ 06:19)  Aspartate Aminotransferase (AST/SGOT): 16 (02-07 @ 06:19)        02-07    132<L>  |  93<L>  |  74<H>  ----------------------------<  122<H>  4.6   |  24  |  3.87<H>    Ca    9.3      07 Feb 2022 06:19  Phos  2.5     02-06  Mg     2.2     02-06    TPro  8.2  /  Alb  2.5<L>  /  TBili  0.4  /  DBili  x   /  AST  16  /  ALT  <5<L>  /  AlkPhos  92  02-07                        9.6    10.88 )-----------( 326      ( 07 Feb 2022 06:20 )             31.1     Medications  MEDICATIONS  (STANDING):  atorvastatin 80 milliGRAM(s) Oral at bedtime  carbidopa/levodopa  25/100 2.5 Tablet(s) Oral <User Schedule>  carbidopa/levodopa  25/100 2 Tablet(s) Oral <User Schedule>  chlorhexidine 4% Liquid 1 Application(s) Topical <User Schedule>  cinacalcet 60 milliGRAM(s) Oral daily  dextrose 40% Gel 15 Gram(s) Oral once  dextrose 5%. 1000 milliLiter(s) (50 mL/Hr) IV Continuous <Continuous>  dextrose 5%. 1000 milliLiter(s) (100 mL/Hr) IV Continuous <Continuous>  dextrose 50% Injectable 25 Gram(s) IV Push once  dextrose 50% Injectable 12.5 Gram(s) IV Push once  dextrose 50% Injectable 25 Gram(s) IV Push once  dextrose 50% Injectable 25 Gram(s) IV Push once  dextrose 50% Injectable 12.5 Gram(s) IV Push once  diVALproex Sprinkle 250 milliGRAM(s) Oral three times a day  DULoxetine 20 milliGRAM(s) Oral daily  folic acid 1 milliGRAM(s) Oral daily  glucagon  Injectable 1 milliGRAM(s) IntraMuscular once  insulin glargine Injectable (LANTUS) 6 Unit(s) SubCutaneous at bedtime  insulin lispro (ADMELOG) corrective regimen sliding scale   SubCutaneous every 6 hours  levothyroxine 25 MICROGram(s) Oral daily  memantine 5 milliGRAM(s) Oral at bedtime  metoprolol tartrate Injectable 5 milliGRAM(s) IV Push every 6 hours  midodrine 10 milliGRAM(s) Oral <User Schedule>  mirtazapine 7.5 milliGRAM(s) Oral daily  Nephro-celeste 1 Tablet(s) Oral daily  pantoprazole   Suspension 40 milliGRAM(s) Enteral Tube two times a day  QUEtiapine 25 milliGRAM(s) Oral at bedtime  tamsulosin 0.4 milliGRAM(s) Oral at bedtime  trihexyphenidyl 2 milliGRAM(s) Oral three times a day      Physical Exam  General: Patient comfortable in bed  Vital Signs Last 12 Hrs  T(F): 97.6 (02-07-22 @ 10:51), Max: 97.7 (02-07-22 @ 04:10)  HR: 116 (02-07-22 @ 10:51) (116 - 121)  BP: 146/95 (02-07-22 @ 10:51) (146/94 - 146/95)  BP(mean): --  RR: 19 (02-07-22 @ 10:51) (19 - 20)  SpO2: 94% (02-07-22 @ 10:51) (94% - 95%)  Neck: No palpable thyroid nodules.  CVS: S1S2, No murmurs  Respiratory: No wheezing, no crepitations  GI: Abdomen soft, bowel sounds positive  Musculoskeletal:  edema lower extremities.   Skin: No skin rashes, no ecchymosis    Diagnostics             Chief complaint  Patient is a 71y old  Male who presents with a chief complaint of leg swelling (07 Feb 2022 13:13)   Review of systems  Events noted, patient pulled out PEG, no hypoglycemic episodes.    Labs and Fingersticks  CAPILLARY BLOOD GLUCOSE      POCT Blood Glucose.: 99 mg/dL (07 Feb 2022 11:40)  POCT Blood Glucose.: 137 mg/dL (07 Feb 2022 06:10)  POCT Blood Glucose.: 139 mg/dL (07 Feb 2022 00:11)  POCT Blood Glucose.: 113 mg/dL (06 Feb 2022 18:36)      Anion Gap, Serum: 15 (02-07 @ 06:19)  Anion Gap, Serum: 13 (02-06 @ 06:12)      Calcium, Total Serum: 9.3 (02-07 @ 06:19)  Calcium, Total Serum: 9.2 (02-06 @ 06:12)  Albumin, Serum: 2.5 *L* (02-07 @ 06:19)    Alanine Aminotransferase (ALT/SGPT): <5 *L* (02-07 @ 06:19)  Alkaline Phosphatase, Serum: 92 (02-07 @ 06:19)  Aspartate Aminotransferase (AST/SGOT): 16 (02-07 @ 06:19)        02-07    132<L>  |  93<L>  |  74<H>  ----------------------------<  122<H>  4.6   |  24  |  3.87<H>    Ca    9.3      07 Feb 2022 06:19  Phos  2.5     02-06  Mg     2.2     02-06    TPro  8.2  /  Alb  2.5<L>  /  TBili  0.4  /  DBili  x   /  AST  16  /  ALT  <5<L>  /  AlkPhos  92  02-07                        9.6    10.88 )-----------( 326      ( 07 Feb 2022 06:20 )             31.1     Medications  MEDICATIONS  (STANDING):  atorvastatin 80 milliGRAM(s) Oral at bedtime  carbidopa/levodopa  25/100 2.5 Tablet(s) Oral <User Schedule>  carbidopa/levodopa  25/100 2 Tablet(s) Oral <User Schedule>  chlorhexidine 4% Liquid 1 Application(s) Topical <User Schedule>  cinacalcet 60 milliGRAM(s) Oral daily  dextrose 40% Gel 15 Gram(s) Oral once  dextrose 5%. 1000 milliLiter(s) (50 mL/Hr) IV Continuous <Continuous>  dextrose 5%. 1000 milliLiter(s) (100 mL/Hr) IV Continuous <Continuous>  dextrose 50% Injectable 25 Gram(s) IV Push once  dextrose 50% Injectable 12.5 Gram(s) IV Push once  dextrose 50% Injectable 25 Gram(s) IV Push once  dextrose 50% Injectable 25 Gram(s) IV Push once  dextrose 50% Injectable 12.5 Gram(s) IV Push once  diVALproex Sprinkle 250 milliGRAM(s) Oral three times a day  DULoxetine 20 milliGRAM(s) Oral daily  folic acid 1 milliGRAM(s) Oral daily  glucagon  Injectable 1 milliGRAM(s) IntraMuscular once  insulin glargine Injectable (LANTUS) 6 Unit(s) SubCutaneous at bedtime  insulin lispro (ADMELOG) corrective regimen sliding scale   SubCutaneous every 6 hours  levothyroxine 25 MICROGram(s) Oral daily  memantine 5 milliGRAM(s) Oral at bedtime  metoprolol tartrate Injectable 5 milliGRAM(s) IV Push every 6 hours  midodrine 10 milliGRAM(s) Oral <User Schedule>  mirtazapine 7.5 milliGRAM(s) Oral daily  Nephro-celeste 1 Tablet(s) Oral daily  pantoprazole   Suspension 40 milliGRAM(s) Enteral Tube two times a day  QUEtiapine 25 milliGRAM(s) Oral at bedtime  tamsulosin 0.4 milliGRAM(s) Oral at bedtime  trihexyphenidyl 2 milliGRAM(s) Oral three times a day      Physical Exam  General: Patient comfortable in bed  Vital Signs Last 12 Hrs  T(F): 97.6 (02-07-22 @ 10:51), Max: 97.7 (02-07-22 @ 04:10)  HR: 116 (02-07-22 @ 10:51) (116 - 121)  BP: 146/95 (02-07-22 @ 10:51) (146/94 - 146/95)  BP(mean): --  RR: 19 (02-07-22 @ 10:51) (19 - 20)  SpO2: 94% (02-07-22 @ 10:51) (94% - 95%)  Neck: No palpable thyroid nodules.  CVS: S1S2, No murmurs  Respiratory: No wheezing, no crepitations  GI: Abdomen soft, bowel sounds positive  Musculoskeletal:  edema lower extremities.   Skin: No skin rashes, no ecchymosis    Diagnostics

## 2022-02-07 NOTE — PROGRESS NOTE ADULT - SUBJECTIVE AND OBJECTIVE BOX
Chief Complaint:  Patient is a 71y old  Male who presents with a chief complaint of leg swelling (07 Feb 2022 10:52)      Date of service 02-07-22 @ 13:14      Interval Events:   Patient seen and examined. Patient pulled out PEG tube yesterday. 16F johnson replaced in tract to keep open by medicine PA.      Hospital Medications:  acetaminophen     Tablet .. 650 milliGRAM(s) Oral every 6 hours PRN  albuterol/ipratropium for Nebulization 3 milliLiter(s) Nebulizer every 6 hours PRN  atorvastatin 80 milliGRAM(s) Oral at bedtime  carbidopa/levodopa  25/100 2.5 Tablet(s) Oral <User Schedule>  carbidopa/levodopa  25/100 2 Tablet(s) Oral <User Schedule>  chlorhexidine 4% Liquid 1 Application(s) Topical <User Schedule>  cinacalcet 60 milliGRAM(s) Oral daily  dextrose 40% Gel 15 Gram(s) Oral once  dextrose 5%. 1000 milliLiter(s) IV Continuous <Continuous>  dextrose 5%. 1000 milliLiter(s) IV Continuous <Continuous>  dextrose 50% Injectable 25 Gram(s) IV Push once  dextrose 50% Injectable 12.5 Gram(s) IV Push once  dextrose 50% Injectable 25 Gram(s) IV Push once  dextrose 50% Injectable 25 Gram(s) IV Push once  dextrose 50% Injectable 12.5 Gram(s) IV Push once  diVALproex Sprinkle 125 milliGRAM(s) Oral every 8 hours PRN  diVALproex Sprinkle 250 milliGRAM(s) Oral three times a day  DULoxetine 20 milliGRAM(s) Oral daily  folic acid 1 milliGRAM(s) Oral daily  glucagon  Injectable 1 milliGRAM(s) IntraMuscular once  guaiFENesin Oral Liquid (Sugar-Free) 200 milliGRAM(s) Oral every 6 hours PRN  insulin glargine Injectable (LANTUS) 6 Unit(s) SubCutaneous at bedtime  insulin lispro (ADMELOG) corrective regimen sliding scale   SubCutaneous every 6 hours  levothyroxine 25 MICROGram(s) Oral daily  memantine 5 milliGRAM(s) Oral at bedtime  metoprolol tartrate Injectable 5 milliGRAM(s) IV Push every 6 hours  midodrine 10 milliGRAM(s) Oral <User Schedule>  mirtazapine 7.5 milliGRAM(s) Oral daily  Nephro-celeste 1 Tablet(s) Oral daily  ondansetron Injectable 4 milliGRAM(s) IV Push once PRN  pantoprazole   Suspension 40 milliGRAM(s) Enteral Tube two times a day  QUEtiapine 25 milliGRAM(s) Oral at bedtime  QUEtiapine 12.5 milliGRAM(s) Oral every 6 hours PRN  sodium chloride 0.9% lock flush 10 milliLiter(s) IV Push every 1 hour PRN  tamsulosin 0.4 milliGRAM(s) Oral at bedtime  trihexyphenidyl 2 milliGRAM(s) Oral three times a day        Review of Systems:  unable to obtain    PHYSICAL EXAM:   Vital Signs:  Vital Signs Last 24 Hrs  T(C): 36.4 (07 Feb 2022 10:51), Max: 36.5 (07 Feb 2022 04:10)  T(F): 97.6 (07 Feb 2022 10:51), Max: 97.7 (07 Feb 2022 04:10)  HR: 116 (07 Feb 2022 10:51) (103 - 121)  BP: 146/95 (07 Feb 2022 10:51) (146/94 - 150/80)  BP(mean): --  RR: 19 (07 Feb 2022 10:51) (19 - 20)  SpO2: 94% (07 Feb 2022 10:51) (94% - 95%)  Daily     Daily       PHYSICAL EXAM:     GENERAL:  Appears stated age, well-groomed, well-nourished, no distress  HEENT:  NC/AT,  conjunctivae anicteric, clear and pink,   NECK: supple, trachea midline  CHEST:  Full & symmetric excursion, no increased effort, breath sounds clear  HEART:  Regular rhythm, no JVD  ABDOMEN:  Soft, non-tender, non-distended, normoactive bowel sounds,  no masses , no hepatosplenomegaly  EXTREMITIES:  no cyanosis,clubbing or edema  SKIN:  No rash, erythema, or, ecchymoses, no jaundice  NEURO:  non-focal, no asterixis  RECTAL: Deferred      LABS Personally reviewed by me:                        9.6    10.88 )-----------( 326      ( 07 Feb 2022 06:20 )             31.1     Mean Cell Volume: 107.6 fl (02-07-22 @ 06:20)    02-07    132<L>  |  93<L>  |  74<H>  ----------------------------<  122<H>  4.6   |  24  |  3.87<H>    Ca    9.3      07 Feb 2022 06:19  Phos  2.5     02-06  Mg     2.2     02-06    TPro  8.2  /  Alb  2.5<L>  /  TBili  0.4  /  DBili  x   /  AST  16  /  ALT  <5<L>  /  AlkPhos  92  02-07    LIVER FUNCTIONS - ( 07 Feb 2022 06:19 )  Alb: 2.5 g/dL / Pro: 8.2 g/dL / ALK PHOS: 92 U/L / ALT: <5 U/L / AST: 16 U/L / GGT: x                                       9.6    10.88 )-----------( 326      ( 07 Feb 2022 06:20 )             31.1                         9.6    8.57  )-----------( 327      ( 06 Feb 2022 06:16 )             31.9                         9.2    8.65  )-----------( 336      ( 05 Feb 2022 05:17 )             29.1       Imaging personally reviewed by me:

## 2022-02-07 NOTE — PROGRESS NOTE ADULT - ASSESSMENT
71 M w volume overload, GI consulted for drop in hgb    1. CHF per cardio    2. ABBY on CKD per renal  -on HD via permacath per renal, midodrine during dialysis  -AVF not functional   -s/p non-tunneled HD catheter insertion 2/4.    3. RP bleed  s/p Abdominal Angiography and Embolization on 10/29/2021 by Interventional radiology of l4and l5 lumbar artery     4. Failure to thrive  -s/p PEG 1/10    -patient pulled out PEG yesterday; 16F johnson placed to keep tract open by medicine PA   -IR consulted for PEG replacement with IR; to be replaced possibly Tuesday  -maintain mittens     5. stress duodenal ulcers  -PPI BID    6. Constipation  -resolved  -nonobstructive gas pattern on x-ray 1/13  -continue Miralax BID  -DC planning to rehab underway     [facility]  Gastroenterology and Hepatology  [address] [place], [place]  Office: [phone]  Cell: [phone]

## 2022-02-07 NOTE — PROGRESS NOTE ADULT - ASSESSMENT
71M w/ HTN, DM2, CAD-CABG, Parkinson's disease, and advanced CKD, 10/21/21 p/w LE swelling, c/b COVID; now newly ESRD-HD as of this admission    (1)Renal - ESRD-HD TTS -   (2)Hyponatremia - higher Na+ bath with dialysis;   (3)Anemia- hgb improving      RECOMMEND  (1)Next HD in am;   Please do not use AVF(maturing). Use perm cath     (2)Nutritional support once peg replaced    [facility]  iPrint  [phone]

## 2022-02-07 NOTE — PROGRESS NOTE ADULT - SUBJECTIVE AND OBJECTIVE BOX
NEPHROLOGY[facility] [phone]        Patient seen and examined Bed. Patient remains confused. he pulled out peg        MEDICATIONS  (STANDING):  atorvastatin 80 milliGRAM(s) Oral at bedtime  carbidopa/levodopa  25/100 2.5 Tablet(s) Oral <User Schedule>  carbidopa/levodopa  25/100 2 Tablet(s) Oral <User Schedule>  chlorhexidine 4% Liquid 1 Application(s) Topical <User Schedule>  cinacalcet 60 milliGRAM(s) Oral daily  dextrose 40% Gel 15 Gram(s) Oral once  dextrose 5%. 1000 milliLiter(s) (50 mL/Hr) IV Continuous <Continuous>  dextrose 5%. 1000 milliLiter(s) (100 mL/Hr) IV Continuous <Continuous>  dextrose 50% Injectable 25 Gram(s) IV Push once  dextrose 50% Injectable 12.5 Gram(s) IV Push once  dextrose 50% Injectable 25 Gram(s) IV Push once  dextrose 50% Injectable 25 Gram(s) IV Push once  dextrose 50% Injectable 12.5 Gram(s) IV Push once  diVALproex Sprinkle 250 milliGRAM(s) Oral three times a day  DULoxetine 20 milliGRAM(s) Oral daily  folic acid 1 milliGRAM(s) Oral daily  glucagon  Injectable 1 milliGRAM(s) IntraMuscular once  insulin glargine Injectable (LANTUS) 6 Unit(s) SubCutaneous at bedtime  insulin lispro (ADMELOG) corrective regimen sliding scale   SubCutaneous every 6 hours  levothyroxine 25 MICROGram(s) Oral daily  memantine 5 milliGRAM(s) Oral at bedtime  metoprolol tartrate Injectable 5 milliGRAM(s) IV Push every 6 hours  midodrine 10 milliGRAM(s) Oral <User Schedule>  mirtazapine 7.5 milliGRAM(s) Oral daily  Nephro-celeste 1 Tablet(s) Oral daily  pantoprazole   Suspension 40 milliGRAM(s) Enteral Tube two times a day  QUEtiapine 25 milliGRAM(s) Oral at bedtime  tamsulosin 0.4 milliGRAM(s) Oral at bedtime  trihexyphenidyl 2 milliGRAM(s) Oral three times a day      VITAL:  T(C): , Max: 36.5 (02-07-22 @ 04:10)  T(F): , Max: 97.7 (02-07-22 @ 04:10)  HR: 116 (02-07-22 @ 21:00)  BP: 153/95 (02-07-22 @ 21:00)  BP(mean): --  RR: 19 (02-07-22 @ 21:00)  SpO2: 93% (02-07-22 @ 21:00)  Wt(kg): --    I and O's:    02-06 @ 07:01  -  02-07 @ 07:00  --------------------------------------------------------  IN: 0 mL / OUT: 500 mL / NET: -500 mL    02-07 @ 07:01  -  02-07 @ 22:40  --------------------------------------------------------  IN: 0 mL / OUT: 100 mL / NET: -100 mL          PHYSICAL EXAM:    Constitutional: NAD  Neck:  No JVD  Respiratory: CTAB/L  Cardiovascular: S1 and S2  Gastrointestinal: BS+, soft, NT/ND  Extremities: No peripheral edema  Neurological: A/O x 3, no focal deficits  Psychiatric: Normal mood, normal affect  : No Javier  Skin: No rashes  Access: Not applicable    LABS:                        9.6    10.88 )-----------( 326      ( 07 Feb 2022 06:20 )             31.1     02-07    132<L>  |  93<L>  |  74<H>  ----------------------------<  122<H>  4.6   |  24  |  3.87<H>    Ca    9.3      07 Feb 2022 06:19  Phos  2.5     02-06  Mg     2.2     02-06    TPro  8.2  /  Alb  2.5<L>  /  TBili  0.4  /  DBili  x   /  AST  16  /  ALT  <5<L>  /  AlkPhos  92  02-07          Urine Studies:          RADIOLOGY & ADDITIONAL STUDIES:

## 2022-02-08 NOTE — PROGRESS NOTE ADULT - SUBJECTIVE AND OBJECTIVE BOX
Chief Complaint:  Patient is a 71y old  Male who presents with a chief complaint of leg swelling (08 Feb 2022 12:23)      Date of service 02-08-22 @ 14:42      Interval Events:  Patient seen and examined.   PEG to be replaced by IR.   Plan for tunneled catheter.      Hospital Medications:  acetaminophen     Tablet .. 650 milliGRAM(s) Oral every 6 hours PRN  albuterol/ipratropium for Nebulization 3 milliLiter(s) Nebulizer every 6 hours PRN  atorvastatin 80 milliGRAM(s) Oral at bedtime  carbidopa/levodopa  25/100 2.5 Tablet(s) Oral <User Schedule>  carbidopa/levodopa  25/100 2 Tablet(s) Oral <User Schedule>  chlorhexidine 4% Liquid 1 Application(s) Topical <User Schedule>  cinacalcet 60 milliGRAM(s) Oral daily  dextrose 40% Gel 15 Gram(s) Oral once  dextrose 5%. 1000 milliLiter(s) IV Continuous <Continuous>  dextrose 5%. 1000 milliLiter(s) IV Continuous <Continuous>  dextrose 50% Injectable 25 Gram(s) IV Push once  dextrose 50% Injectable 12.5 Gram(s) IV Push once  dextrose 50% Injectable 25 Gram(s) IV Push once  dextrose 50% Injectable 25 Gram(s) IV Push once  dextrose 50% Injectable 12.5 Gram(s) IV Push once  diVALproex Sprinkle 125 milliGRAM(s) Oral every 8 hours PRN  diVALproex Sprinkle 250 milliGRAM(s) Oral three times a day  DULoxetine 20 milliGRAM(s) Oral daily  folic acid 1 milliGRAM(s) Oral daily  glucagon  Injectable 1 milliGRAM(s) IntraMuscular once  guaiFENesin Oral Liquid (Sugar-Free) 200 milliGRAM(s) Oral every 6 hours PRN  insulin glargine Injectable (LANTUS) 6 Unit(s) SubCutaneous at bedtime  insulin lispro (ADMELOG) corrective regimen sliding scale   SubCutaneous every 6 hours  levothyroxine 25 MICROGram(s) Oral daily  memantine 5 milliGRAM(s) Oral at bedtime  metoprolol tartrate Injectable 5 milliGRAM(s) IV Push every 6 hours  midodrine 10 milliGRAM(s) Oral <User Schedule>  mirtazapine 7.5 milliGRAM(s) Oral daily  Nephro-celeste 1 Tablet(s) Oral daily  ondansetron Injectable 4 milliGRAM(s) IV Push once PRN  pantoprazole   Suspension 40 milliGRAM(s) Enteral Tube two times a day  QUEtiapine 12.5 milliGRAM(s) Oral every 6 hours PRN  QUEtiapine 25 milliGRAM(s) Oral at bedtime  sodium chloride 0.9% lock flush 10 milliLiter(s) IV Push every 1 hour PRN  tamsulosin 0.4 milliGRAM(s) Oral at bedtime  trihexyphenidyl 2 milliGRAM(s) Oral three times a day        Review of Systems:  unable to obtain    PHYSICAL EXAM:   Vital Signs:  Vital Signs Last 24 Hrs  T(C): 36.8 (08 Feb 2022 10:57), Max: 36.8 (08 Feb 2022 10:57)  T(F): 98.2 (08 Feb 2022 10:57), Max: 98.2 (08 Feb 2022 10:57)  HR: 127 (08 Feb 2022 10:57) (103 - 127)  BP: 165/92 (08 Feb 2022 10:57) (138/82 - 165/92)  BP(mean): --  RR: 21 (08 Feb 2022 10:57) (19 - 21)  SpO2: 92% (08 Feb 2022 10:57) (92% - 93%)  Daily     Daily       PHYSICAL EXAM:     GENERAL:  Appears stated age, well-groomed, well-nourished, no distress  HEENT:  NC/AT,  conjunctivae anicteric, clear and pink,   NECK: supple, trachea midline  CHEST:  Full & symmetric excursion, no increased effort, breath sounds clear  HEART:  Regular rhythm, no JVD  ABDOMEN:  Soft, non-tender, non-distended, normoactive bowel sounds,  no masses , no hepatosplenomegaly  EXTREMITIES:  no cyanosis,clubbing or edema  SKIN:  No rash, erythema, or, ecchymoses, no jaundice  NEURO:  non-focal, no asterixis  RECTAL: Deferred      LABS Personally reviewed by me:                        9.7    10.01 )-----------( 377      ( 08 Feb 2022 05:40 )             31.4     Mean Cell Volume: 107.2 fl (02-08-22 @ 05:40)    02-08    135  |  97  |  81<H>  ----------------------------<  80  4.4   |  24  |  4.34<H>    Ca    9.6      08 Feb 2022 05:40    TPro  8.2  /  Alb  2.5<L>  /  TBili  0.4  /  DBili  x   /  AST  16  /  ALT  <5<L>  /  AlkPhos  92  02-07    LIVER FUNCTIONS - ( 07 Feb 2022 06:19 )  Alb: 2.5 g/dL / Pro: 8.2 g/dL / ALK PHOS: 92 U/L / ALT: <5 U/L / AST: 16 U/L / GGT: x                                       9.7    10.01 )-----------( 377      ( 08 Feb 2022 05:40 )             31.4                         9.6    10.88 )-----------( 326      ( 07 Feb 2022 06:20 )             31.1                         9.6    8.57  )-----------( 327      ( 06 Feb 2022 06:16 )             31.9       Imaging personally reviewed by me:

## 2022-02-08 NOTE — PROGRESS NOTE ADULT - PROBLEM SELECTOR PLAN 1
Will DC Lantus for now and continue coverage scale. Will continue monitoring FS and FU.  Suggest DC on Tradjenta 5mg daily, discontinue prior hypoglycemic agents/insulin, endo FU 4 weeks.

## 2022-02-08 NOTE — CHART NOTE - NSCHARTNOTEFT_GEN_A_CORE
Notified by RN; pt with steadily decreasing FS readings.    FS 73 8:13pm 2/8/2022  Trend 75, 82, 83 as of 5pm, 11am, and 6am 2/8/2022    Pt is Notified by RN; pt with steadily decreasing FS readings.    FS 73 8:13pm 2/8/2022  Trend 75, 82, 83 as of 5pm, 11am, and 6am 2/8/2022    Pt is 70yo M w/ PMHx of CAD (s/p CABG 2019), CKD (unknown stage), DM2, Parkinson's Disease, HTN, depression presents with new onset acute heart failure exacerbation, NSTEMI, started on hep gtt, developed bl RP bleed, acute anemia. sp MICU course for hemorrhagic shock, 10/28 sp IR embolization l4 and l5 lumbar artery. sp permacath and AVF; NPO for Permacath and PEG placement 2/9/2022.    D50 12.5 IV push x1 given as pt was suppose to have above procedures today 2/8/2022.    Will repeat FS 30 minutes after D50 administration, and then continue FS q6hrs as pt is NPO with tube feeds held.    Will monitor closely and endorse to AM team. Notified by RN; pt with steadily decreasing FS readings.    FS 73 8:13pm 2/8/2022  Trend 75, 82, 83 as of 5pm, 11am, and 6am 2/8/2022    Pt is 72yo M w/ PMHx of CAD (s/p CABG 2019), CKD (unknown stage), DM2, Parkinson's Disease, HTN, depression presents with new onset acute heart failure exacerbation, NSTEMI, started on hep gtt, developed bl RP bleed, acute anemia. sp MICU course for hemorrhagic shock, 10/28 sp IR embolization l4 and l5 lumbar artery. sp permacath and AVF; NPO for Permacath and PEG placement 2/9/2022.    D50 12.5 IV push x1 given as pt was suppose to have above procedures today 2/8/2022.    Repeat     Continue FS q6hrs as pt is NPO with tube feeds held.    Will monitor closely and endorse to AM team.

## 2022-02-08 NOTE — PROGRESS NOTE ADULT - ASSESSMENT
70yo M w/ PMHx of CAD (s/p CABG 2019), CKD (unknown stage), DM2, Parkinson's Disease, HTN, depression presents with new onset acute heart failure exacerbation, NSTEMI, started on hep gtt, developed bl RP bleed, acute anemia. sp MICU course for hemorrhagic shock, 10/28 sp IR embolization l4 and l5 lumbar artery. sp permacath and AVF.    # chronic systolic and diastolic heart failure  # ESRD, new HD  # Atrial flutter  # Parkinson's disease   # delirium  # CAD (coronary artery disease)  # HTN  # DM2 (diabetes mellitus, type 2)  # FTT sp PEG  midodrine during dialysis  sp L AVF using for HD, permacath dced  1/29 malfunctioning Lt AVF - 2/2 sp fistulogram and angioplasty by IR - nonfunctioning  sp shiley 2/4  insulin per endo  afib rvr as missed PO medication, cont lopressor, atorvastatin - can give by mouth now that no PEG, PEG not for dysphagia   2/6 pt pulled out peg tube  2/7 IR for permacath and PEG re placement    DVT ppx hsq    DNR/DNI    updated son HCP Branden over the phone today  - events overnight relayed, confirmed family still want all aggressive measures and to cont dialysis    [facility]   70yo M w/ PMHx of CAD (s/p CABG 2019), CKD (unknown stage), DM2, Parkinson's Disease, HTN, depression presents with new onset acute heart failure exacerbation, NSTEMI, started on hep gtt, developed bl RP bleed, acute anemia. sp MICU course for hemorrhagic shock, 10/28 sp IR embolization l4 and l5 lumbar artery. sp permacath and AVF.    # chronic systolic and diastolic heart failure  # ESRD, new HD  # Atrial flutter  # Parkinson's disease   # delirium  # CAD (coronary artery disease)  # HTN  # DM2 (diabetes mellitus, type 2)  # FTT sp PEG  midodrine during dialysis  sp L AVF using for HD, permacath dced  1/29 malfunctioning Lt AVF - 2/2 sp fistulogram and angioplasty by IR - nonfunctioning  sp shiley 2/4  insulin per endo  afib rvr as missed PO medication, cont lopressor, atorvastatin - can give by mouth now that no PEG, PEG not for dysphagia   2/6 pt pulled out peg tube  2/7 IR for permacath and PEG re placement    DVT ppx hsq    DNR/DNI    updated son HCP Branden over the phone 2/7  - confirmed family still wants all aggressive measures and to cont dialysis    Dr. Pineda will take over tomorrow.  Please contact with any questions or concerns [phone].

## 2022-02-08 NOTE — PROGRESS NOTE ADULT - SUBJECTIVE AND OBJECTIVE BOX
Patient is a 71y old  Male who presents with a chief complaint of leg swelling (07 Feb 2022 22:39)      SUBJECTIVE / OVERNIGHT EVENTS:    INCOMPLETE NOTE      Vital Signs Last 24 Hrs  T(C): 36.4 (08 Feb 2022 04:16), Max: 36.4 (07 Feb 2022 10:51)  T(F): 97.6 (08 Feb 2022 04:16), Max: 97.6 (07 Feb 2022 10:51)  HR: 103 (08 Feb 2022 04:16) (103 - 116)  BP: 158/72 (08 Feb 2022 04:16) (138/82 - 158/72)  BP(mean): --  RR: 19 (08 Feb 2022 04:16) (19 - 19)  SpO2: 92% (08 Feb 2022 04:16) (92% - 94%)  I&O's Summary    07 Feb 2022 07:01  -  08 Feb 2022 07:00  --------------------------------------------------------  IN: 0 mL / OUT: 400 mL / NET: -400 mL      PE:  GENERAL: NAD, AAOx1  HEAD:  Atraumatic, Normocephalic  EYES: conjunctiva and sclera clear  NECK: No JVD  CHEST/LUNG: CTABL, No wheeze  HEART: Regular rate and rhythm; no murmur  ABDOMEN: Soft, Nontender, Nondistended; Bowel sounds present, johnson catheter in place of PEG  EXTREMITIES:  2+ Peripheral Pulses, left avf  SKIN: No rashes or lesions. right chest [facility]    LABS:                        9.7    10.01 )-----------( 377      ( 08 Feb 2022 05:40 )             31.4     02-08    135  |  97  |  81<H>  ----------------------------<  80  4.4   |  24  |  4.34<H>    Ca    9.6      08 Feb 2022 05:40    TPro  8.2  /  Alb  2.5<L>  /  TBili  0.4  /  DBili  x   /  AST  16  /  ALT  <5<L>  /  AlkPhos  92  02-07      CAPILLARY BLOOD GLUCOSE      POCT Blood Glucose.: 83 mg/dL (08 Feb 2022 06:29)  POCT Blood Glucose.: 95 mg/dL (08 Feb 2022 00:21)  POCT Blood Glucose.: 111 mg/dL (07 Feb 2022 21:56)  POCT Blood Glucose.: 105 mg/dL (07 Feb 2022 17:17)  POCT Blood Glucose.: 99 mg/dL (07 Feb 2022 11:40)            RADIOLOGY & ADDITIONAL TESTS:    Imaging Personally Reviewed:  [x] YES  [ ] NO    Consultant(s) Notes Reviewed:  [x] YES  [ ] NO    MEDICATIONS  (STANDING):  atorvastatin 80 milliGRAM(s) Oral at bedtime  carbidopa/levodopa  25/100 2.5 Tablet(s) Oral <User Schedule>  carbidopa/levodopa  25/100 2 Tablet(s) Oral <User Schedule>  chlorhexidine 4% Liquid 1 Application(s) Topical <User Schedule>  cinacalcet 60 milliGRAM(s) Oral daily  dextrose 40% Gel 15 Gram(s) Oral once  dextrose 5%. 1000 milliLiter(s) (50 mL/Hr) IV Continuous <Continuous>  dextrose 5%. 1000 milliLiter(s) (100 mL/Hr) IV Continuous <Continuous>  dextrose 50% Injectable 25 Gram(s) IV Push once  dextrose 50% Injectable 12.5 Gram(s) IV Push once  dextrose 50% Injectable 25 Gram(s) IV Push once  dextrose 50% Injectable 25 Gram(s) IV Push once  dextrose 50% Injectable 12.5 Gram(s) IV Push once  diVALproex Sprinkle 250 milliGRAM(s) Oral three times a day  DULoxetine 20 milliGRAM(s) Oral daily  folic acid 1 milliGRAM(s) Oral daily  glucagon  Injectable 1 milliGRAM(s) IntraMuscular once  insulin glargine Injectable (LANTUS) 6 Unit(s) SubCutaneous at bedtime  insulin lispro (ADMELOG) corrective regimen sliding scale   SubCutaneous every 6 hours  levothyroxine 25 MICROGram(s) Oral daily  memantine 5 milliGRAM(s) Oral at bedtime  metoprolol tartrate Injectable 5 milliGRAM(s) IV Push every 6 hours  midodrine 10 milliGRAM(s) Oral <User Schedule>  mirtazapine 7.5 milliGRAM(s) Oral daily  Nephro-celeste 1 Tablet(s) Oral daily  pantoprazole   Suspension 40 milliGRAM(s) Enteral Tube two times a day  QUEtiapine 25 milliGRAM(s) Oral at bedtime  tamsulosin 0.4 milliGRAM(s) Oral at bedtime  trihexyphenidyl 2 milliGRAM(s) Oral three times a day    MEDICATIONS  (PRN):  acetaminophen     Tablet .. 650 milliGRAM(s) Oral every 6 hours PRN Mild Pain (1 - 3)  albuterol/ipratropium for Nebulization 3 milliLiter(s) Nebulizer every 6 hours PRN Shortness of Breath and/or Wheezing  diVALproex Sprinkle 125 milliGRAM(s) Oral every 8 hours PRN Agitation  guaiFENesin Oral Liquid (Sugar-Free) 200 milliGRAM(s) Oral every 6 hours PRN Cough  ondansetron Injectable 4 milliGRAM(s) IV Push once PRN Nausea and/or Vomiting  QUEtiapine 12.5 milliGRAM(s) Oral every 6 hours PRN agitation  sodium chloride 0.9% lock flush 10 milliLiter(s) IV Push every 1 hour PRN Pre/post blood products, medications, blood draw, and to maintain line patency      Care Discussed with Consultants/Other Providers [x] YES  [ ] NO    HEALTH ISSUES - PROBLEM Dx:  Acute heart failure    Atrial flutter    DM2 (diabetes mellitus, type 2)    CAD (coronary artery disease)    Parkinsons disease    Acute on chronic renal failure    NSTEMI (non-ST elevation myocardial infarction)    Hypertension    Acute kidney injury superimposed on CKD    Anemia    Hypothyroidism    Delirium    Advanced care planning/counseling discussion    Palliative care status    Palliative care encounter    Pain    Stage 5 chronic kidney disease not on chronic dialysis    Hypercalcemia    Preop cardiovascular exam         Patient is a 71y old  Male who presents with a chief complaint of leg swelling (07 Feb 2022 22:39)      SUBJECTIVE / OVERNIGHT EVENTS:    no acute events. pt cannot provide ros. restless at times.      Vital Signs Last 24 Hrs  T(C): 36.4 (08 Feb 2022 04:16), Max: 36.4 (07 Feb 2022 10:51)  T(F): 97.6 (08 Feb 2022 04:16), Max: 97.6 (07 Feb 2022 10:51)  HR: 103 (08 Feb 2022 04:16) (103 - 116)  BP: 158/72 (08 Feb 2022 04:16) (138/82 - 158/72)  BP(mean): --  RR: 19 (08 Feb 2022 04:16) (19 - 19)  SpO2: 92% (08 Feb 2022 04:16) (92% - 94%)  I&O's Summary    07 Feb 2022 07:01  -  08 Feb 2022 07:00  --------------------------------------------------------  IN: 0 mL / OUT: 400 mL / NET: -400 mL      PE:  GENERAL: NAD, AAOx0  HEAD:  Atraumatic, Normocephalic  EYES: conjunctiva and sclera clear  NECK: No JVD  CHEST/LUNG: CTABL, No wheeze  HEART: Regular rate and rhythm; no murmur  ABDOMEN: Soft, Nontender, Nondistended; Bowel sounds present, johnson catheter in place of PEG  EXTREMITIES:  2+ Peripheral Pulses, left avf + bruit  SKIN: No rashes or lesions. right chest [facility]    LABS:                        9.7    10.01 )-----------( 377      ( 08 Feb 2022 05:40 )             31.4     02-08    135  |  97  |  81<H>  ----------------------------<  80  4.4   |  24  |  4.34<H>    Ca    9.6      08 Feb 2022 05:40    TPro  8.2  /  Alb  2.5<L>  /  TBili  0.4  /  DBili  x   /  AST  16  /  ALT  <5<L>  /  AlkPhos  92  02-07      CAPILLARY BLOOD GLUCOSE      POCT Blood Glucose.: 83 mg/dL (08 Feb 2022 06:29)  POCT Blood Glucose.: 95 mg/dL (08 Feb 2022 00:21)  POCT Blood Glucose.: 111 mg/dL (07 Feb 2022 21:56)  POCT Blood Glucose.: 105 mg/dL (07 Feb 2022 17:17)  POCT Blood Glucose.: 99 mg/dL (07 Feb 2022 11:40)            RADIOLOGY & ADDITIONAL TESTS:    Imaging Personally Reviewed:  [x] YES  [ ] NO    Consultant(s) Notes Reviewed:  [x] YES  [ ] NO    MEDICATIONS  (STANDING):  atorvastatin 80 milliGRAM(s) Oral at bedtime  carbidopa/levodopa  25/100 2.5 Tablet(s) Oral <User Schedule>  carbidopa/levodopa  25/100 2 Tablet(s) Oral <User Schedule>  chlorhexidine 4% Liquid 1 Application(s) Topical <User Schedule>  cinacalcet 60 milliGRAM(s) Oral daily  dextrose 40% Gel 15 Gram(s) Oral once  dextrose 5%. 1000 milliLiter(s) (50 mL/Hr) IV Continuous <Continuous>  dextrose 5%. 1000 milliLiter(s) (100 mL/Hr) IV Continuous <Continuous>  dextrose 50% Injectable 25 Gram(s) IV Push once  dextrose 50% Injectable 12.5 Gram(s) IV Push once  dextrose 50% Injectable 25 Gram(s) IV Push once  dextrose 50% Injectable 25 Gram(s) IV Push once  dextrose 50% Injectable 12.5 Gram(s) IV Push once  diVALproex Sprinkle 250 milliGRAM(s) Oral three times a day  DULoxetine 20 milliGRAM(s) Oral daily  folic acid 1 milliGRAM(s) Oral daily  glucagon  Injectable 1 milliGRAM(s) IntraMuscular once  insulin glargine Injectable (LANTUS) 6 Unit(s) SubCutaneous at bedtime  insulin lispro (ADMELOG) corrective regimen sliding scale   SubCutaneous every 6 hours  levothyroxine 25 MICROGram(s) Oral daily  memantine 5 milliGRAM(s) Oral at bedtime  metoprolol tartrate Injectable 5 milliGRAM(s) IV Push every 6 hours  midodrine 10 milliGRAM(s) Oral <User Schedule>  mirtazapine 7.5 milliGRAM(s) Oral daily  Nephro-celeste 1 Tablet(s) Oral daily  pantoprazole   Suspension 40 milliGRAM(s) Enteral Tube two times a day  QUEtiapine 25 milliGRAM(s) Oral at bedtime  tamsulosin 0.4 milliGRAM(s) Oral at bedtime  trihexyphenidyl 2 milliGRAM(s) Oral three times a day    MEDICATIONS  (PRN):  acetaminophen     Tablet .. 650 milliGRAM(s) Oral every 6 hours PRN Mild Pain (1 - 3)  albuterol/ipratropium for Nebulization 3 milliLiter(s) Nebulizer every 6 hours PRN Shortness of Breath and/or Wheezing  diVALproex Sprinkle 125 milliGRAM(s) Oral every 8 hours PRN Agitation  guaiFENesin Oral Liquid (Sugar-Free) 200 milliGRAM(s) Oral every 6 hours PRN Cough  ondansetron Injectable 4 milliGRAM(s) IV Push once PRN Nausea and/or Vomiting  QUEtiapine 12.5 milliGRAM(s) Oral every 6 hours PRN agitation  sodium chloride 0.9% lock flush 10 milliLiter(s) IV Push every 1 hour PRN Pre/post blood products, medications, blood draw, and to maintain line patency      Care Discussed with Consultants/Other Providers [x] YES  [ ] NO    HEALTH ISSUES - PROBLEM Dx:  Acute heart failure    Atrial flutter    DM2 (diabetes mellitus, type 2)    CAD (coronary artery disease)    Parkinsons disease    Acute on chronic renal failure    NSTEMI (non-ST elevation myocardial infarction)    Hypertension    Acute kidney injury superimposed on CKD    Anemia    Hypothyroidism    Delirium    Advanced care planning/counseling discussion    Palliative care status    Palliative care encounter    Pain    Stage 5 chronic kidney disease not on chronic dialysis    Hypercalcemia    Preop cardiovascular exam

## 2022-02-08 NOTE — PROGRESS NOTE ADULT - ASSESSMENT
Assessment  DMT2: 71y Male with DM T2 with hyperglycemia, A1C 6.4%, was on insulin at home, now on low-dose basal insulin and coverage, blood sugars are trending down, no hypoglycemias, NAD, planning peg to be replaced by IR.  Hypothyroidism: Patient has no history thyroid disease, was not on any thyroid supplements, subclinical with low-normal FT4, lethargic, on synthroid 25 mcg po daily, repeat TFTs improved and euthyroid.  Hypercalcemia: Started on Sensipar 60mg daily, Ca improving.  CHF: on medications, stable, monitored.  HTN: Controlled,  on antihypertensive medications.  Parkinsons: on meds, monitored.  CKD: Monitor labs/BMP       Assessment  DMT2: 71y Male with DM T2 with hyperglycemia, A1C 6.4%, was on insulin at home, now on low-dose basal insulin and coverage, blood sugars are trending down, no hypoglycemias, NAD,  planning peg to be replaced by IR.  Hypothyroidism: Patient has no history thyroid disease, was not on any thyroid supplements, subclinical with low-normal FT4, lethargic, on synthroid 25 mcg po daily, repeat TFTs improved and euthyroid.  Hypercalcemia: Started on Sensipar 60mg daily, Ca improving.  CHF: on medications, stable, monitored.  HTN: Controlled,  on antihypertensive medications.  Parkinsons: on meds, monitored.  CKD: Monitor labs/BMP

## 2022-02-08 NOTE — PROGRESS NOTE ADULT - SUBJECTIVE AND OBJECTIVE BOX
Chief complaint  Patient is a 71y old  Male who presents with a chief complaint of leg swelling (08 Feb 2022 14:42)   Review of systems  Patient in bed, looks comfortable, no hypoglycemic episodes.    Labs and Fingersticks  CAPILLARY BLOOD GLUCOSE      POCT Blood Glucose.: 82 mg/dL (08 Feb 2022 11:36)  POCT Blood Glucose.: 83 mg/dL (08 Feb 2022 06:29)  POCT Blood Glucose.: 95 mg/dL (08 Feb 2022 00:21)  POCT Blood Glucose.: 111 mg/dL (07 Feb 2022 21:56)  POCT Blood Glucose.: 105 mg/dL (07 Feb 2022 17:17)      Anion Gap, Serum: 14 (02-08 @ 05:40)  Anion Gap, Serum: 15 (02-07 @ 06:19)      Calcium, Total Serum: 9.6 (02-08 @ 05:40)  Calcium, Total Serum: 9.3 (02-07 @ 06:19)  Albumin, Serum: 2.5 *L* (02-07 @ 06:19)    Alanine Aminotransferase (ALT/SGPT): <5 *L* (02-07 @ 06:19)  Alkaline Phosphatase, Serum: 92 (02-07 @ 06:19)  Aspartate Aminotransferase (AST/SGOT): 16 (02-07 @ 06:19)        02-08    135  |  97  |  81<H>  ----------------------------<  80  4.4   |  24  |  4.34<H>    Ca    9.6      08 Feb 2022 05:40    TPro  8.2  /  Alb  2.5<L>  /  TBili  0.4  /  DBili  x   /  AST  16  /  ALT  <5<L>  /  AlkPhos  92  02-07                        9.7    10.01 )-----------( 377      ( 08 Feb 2022 05:40 )             31.4     Medications  MEDICATIONS  (STANDING):  atorvastatin 80 milliGRAM(s) Oral at bedtime  carbidopa/levodopa  25/100 2.5 Tablet(s) Oral <User Schedule>  carbidopa/levodopa  25/100 2 Tablet(s) Oral <User Schedule>  chlorhexidine 4% Liquid 1 Application(s) Topical <User Schedule>  cinacalcet 60 milliGRAM(s) Oral daily  dextrose 40% Gel 15 Gram(s) Oral once  dextrose 5%. 1000 milliLiter(s) (50 mL/Hr) IV Continuous <Continuous>  dextrose 5%. 1000 milliLiter(s) (100 mL/Hr) IV Continuous <Continuous>  dextrose 50% Injectable 25 Gram(s) IV Push once  dextrose 50% Injectable 12.5 Gram(s) IV Push once  dextrose 50% Injectable 25 Gram(s) IV Push once  dextrose 50% Injectable 12.5 Gram(s) IV Push once  dextrose 50% Injectable 25 Gram(s) IV Push once  diVALproex Sprinkle 250 milliGRAM(s) Oral three times a day  DULoxetine 20 milliGRAM(s) Oral daily  folic acid 1 milliGRAM(s) Oral daily  glucagon  Injectable 1 milliGRAM(s) IntraMuscular once  insulin lispro (ADMELOG) corrective regimen sliding scale   SubCutaneous every 6 hours  levothyroxine 25 MICROGram(s) Oral daily  memantine 5 milliGRAM(s) Oral at bedtime  metoprolol tartrate Injectable 5 milliGRAM(s) IV Push every 6 hours  midodrine 10 milliGRAM(s) Oral <User Schedule>  mirtazapine 7.5 milliGRAM(s) Oral daily  Nephro-celeste 1 Tablet(s) Oral daily  pantoprazole   Suspension 40 milliGRAM(s) Enteral Tube two times a day  QUEtiapine 25 milliGRAM(s) Oral at bedtime  tamsulosin 0.4 milliGRAM(s) Oral at bedtime  trihexyphenidyl 2 milliGRAM(s) Oral three times a day      Physical Exam  General: Patient comfortable in bed  Vital Signs Last 12 Hrs  T(F): 98.2 (02-08-22 @ 10:57), Max: 98.2 (02-08-22 @ 10:57)  HR: 127 (02-08-22 @ 10:57) (103 - 127)  BP: 165/92 (02-08-22 @ 10:57) (158/72 - 165/92)  BP(mean): --  RR: 21 (02-08-22 @ 10:57) (19 - 21)  SpO2: 92% (02-08-22 @ 10:57) (92% - 92%)  Neck: No palpable thyroid nodules.  CVS: S1S2, No murmurs  Respiratory: No wheezing, no crepitations  GI: Abdomen soft, bowel sounds positive  Musculoskeletal:  edema lower extremities.   Skin: No skin rashes, no ecchymosis    Diagnostics             Chief complaint  Patient is a 71y old  Male who presents with a chief complaint of leg swelling (08 Feb 2022 14:42)   Review of systems  Patient in bed, looks comfortable, no hypoglycemic episodes.    Labs and Fingersticks  CAPILLARY BLOOD GLUCOSE      POCT Blood Glucose.: 82 mg/dL (08 Feb 2022 11:36)  POCT Blood Glucose.: 83 mg/dL (08 Feb 2022 06:29)  POCT Blood Glucose.: 95 mg/dL (08 Feb 2022 00:21)  POCT Blood Glucose.: 111 mg/dL (07 Feb 2022 21:56)  POCT Blood Glucose.: 105 mg/dL (07 Feb 2022 17:17)      Anion Gap, Serum: 14 (02-08 @ 05:40)  Anion Gap, Serum: 15 (02-07 @ 06:19)      Calcium, Total Serum: 9.6 (02-08 @ 05:40)  Calcium, Total Serum: 9.3 (02-07 @ 06:19)  Albumin, Serum: 2.5 *L* (02-07 @ 06:19)    Alanine Aminotransferase (ALT/SGPT): <5 *L* (02-07 @ 06:19)  Alkaline Phosphatase, Serum: 92 (02-07 @ 06:19)  Aspartate Aminotransferase (AST/SGOT): 16 (02-07 @ 06:19)        02-08    135  |  97  |  81<H>  ----------------------------<  80  4.4   |  24  |  4.34<H>    Ca    9.6      08 Feb 2022 05:40    TPro  8.2  /  Alb  2.5<L>  /  TBili  0.4  /  DBili  x   /  AST  16  /  ALT  <5<L>  /  AlkPhos  92  02-07                        9.7    10.01 )-----------( 377      ( 08 Feb 2022 05:40 )             31.4     Medications  MEDICATIONS  (STANDING):  atorvastatin 80 milliGRAM(s) Oral at bedtime  carbidopa/levodopa  25/100 2.5 Tablet(s) Oral <User Schedule>  carbidopa/levodopa  25/100 2 Tablet(s) Oral <User Schedule>  chlorhexidine 4% Liquid 1 Application(s) Topical <User Schedule>  cinacalcet 60 milliGRAM(s) Oral daily  dextrose 40% Gel 15 Gram(s) Oral once  dextrose 5%. 1000 milliLiter(s) (50 mL/Hr) IV Continuous <Continuous>  dextrose 5%. 1000 milliLiter(s) (100 mL/Hr) IV Continuous <Continuous>  dextrose 50% Injectable 25 Gram(s) IV Push once  dextrose 50% Injectable 12.5 Gram(s) IV Push once  dextrose 50% Injectable 25 Gram(s) IV Push once  dextrose 50% Injectable 12.5 Gram(s) IV Push once  dextrose 50% Injectable 25 Gram(s) IV Push once  diVALproex Sprinkle 250 milliGRAM(s) Oral three times a day  DULoxetine 20 milliGRAM(s) Oral daily  folic acid 1 milliGRAM(s) Oral daily  glucagon  Injectable 1 milliGRAM(s) IntraMuscular once  insulin lispro (ADMELOG) corrective regimen sliding scale   SubCutaneous every 6 hours  levothyroxine 25 MICROGram(s) Oral daily  memantine 5 milliGRAM(s) Oral at bedtime  metoprolol tartrate Injectable 5 milliGRAM(s) IV Push every 6 hours  midodrine 10 milliGRAM(s) Oral <User Schedule>  mirtazapine 7.5 milliGRAM(s) Oral daily  Nephro-celeste 1 Tablet(s) Oral daily  pantoprazole   Suspension 40 milliGRAM(s) Enteral Tube two times a day  QUEtiapine 25 milliGRAM(s) Oral at bedtime  tamsulosin 0.4 milliGRAM(s) Oral at bedtime  trihexyphenidyl 2 milliGRAM(s) Oral three times a day      Physical Exam  General: Patient comfortable in bed  Vital Signs Last 12 Hrs  T(F): 98.2 (02-08-22 @ 10:57), Max: 98.2 (02-08-22 @ 10:57)  HR: 127 (02-08-22 @ 10:57) (103 - 127)  BP: 165/92 (02-08-22 @ 10:57) (158/72 - 165/92)  BP(mean): --  RR: 21 (02-08-22 @ 10:57) (19 - 21)  SpO2: 92% (02-08-22 @ 10:57) (92% - 92%)  Neck: No palpable thyroid nodules.  CVS: S1S2, No murmurs  Respiratory: No wheezing, no crepitations  GI: Abdomen soft, bowel sounds positive  Musculoskeletal:  edema lower extremities.   Skin: No skin rashes, no ecchymosis    Diagnostics

## 2022-02-08 NOTE — PROGRESS NOTE ADULT - SUBJECTIVE AND OBJECTIVE BOX
NEPHROLOGY    Patient seen and examined. Pt awake, confused, in no acute distress. No overnight events noted.     MEDICATIONS  (STANDING):  atorvastatin 80 milliGRAM(s) Oral at bedtime  carbidopa/levodopa  25/100 2.5 Tablet(s) Oral <User Schedule>  carbidopa/levodopa  25/100 2 Tablet(s) Oral <User Schedule>  chlorhexidine 4% Liquid 1 Application(s) Topical <User Schedule>  cinacalcet 60 milliGRAM(s) Oral daily  dextrose 40% Gel 15 Gram(s) Oral once  dextrose 5%. 1000 milliLiter(s) (50 mL/Hr) IV Continuous <Continuous>  dextrose 5%. 1000 milliLiter(s) (100 mL/Hr) IV Continuous <Continuous>  dextrose 50% Injectable 25 Gram(s) IV Push once  dextrose 50% Injectable 12.5 Gram(s) IV Push once  dextrose 50% Injectable 25 Gram(s) IV Push once  dextrose 50% Injectable 25 Gram(s) IV Push once  dextrose 50% Injectable 12.5 Gram(s) IV Push once  diVALproex Sprinkle 250 milliGRAM(s) Oral three times a day  DULoxetine 20 milliGRAM(s) Oral daily  folic acid 1 milliGRAM(s) Oral daily  glucagon  Injectable 1 milliGRAM(s) IntraMuscular once  insulin glargine Injectable (LANTUS) 6 Unit(s) SubCutaneous at bedtime  insulin lispro (ADMELOG) corrective regimen sliding scale   SubCutaneous every 6 hours  levothyroxine 25 MICROGram(s) Oral daily  memantine 5 milliGRAM(s) Oral at bedtime  metoprolol tartrate Injectable 5 milliGRAM(s) IV Push every 6 hours  midodrine 10 milliGRAM(s) Oral <User Schedule>  mirtazapine 7.5 milliGRAM(s) Oral daily  Nephro-celeste 1 Tablet(s) Oral daily  pantoprazole   Suspension 40 milliGRAM(s) Enteral Tube two times a day  QUEtiapine 25 milliGRAM(s) Oral at bedtime  tamsulosin 0.4 milliGRAM(s) Oral at bedtime  trihexyphenidyl 2 milliGRAM(s) Oral three times a day    VITALS:  T(C): , Max: 36.8 (02-08-22 @ 10:57)  T(F): , Max: 98.2 (02-08-22 @ 10:57)  HR: 127 (02-08-22 @ 10:57)  BP: 165/92 (02-08-22 @ 10:57)  RR: 21 (02-08-22 @ 10:57)  SpO2: 92% (02-08-22 @ 10:57)    I and O's:    02-07 @ 07:01  -  02-08 @ 07:00  --------------------------------------------------------  IN: 0 mL / OUT: 400 mL / NET: -400 mL    PHYSICAL EXAM:  Constitutional: NAD  Neck:  No JVD  Respiratory: CTAB/L  Cardiovascular: S1 and S2  Gastrointestinal: BS+, soft, NT/ND  Extremities: No peripheral edema  Neurological: A/O x 3, no focal deficits  Psychiatric: Normal mood, normal affect  : No Javier  Skin: No rashes  Access: non tunneled Hd catheter , maturing avf     LABS:                        9.7    10.01 )-----------( 377      ( 08 Feb 2022 05:40 )             31.4     02-08    135  |  97  |  81<H>  ----------------------------<  80  4.4   |  24  |  4.34<H>    Ca    9.6      08 Feb 2022 05:40    TPro  8.2  /  Alb  2.5<L>  /  TBili  0.4  /  DBili  x   /  AST  16  /  ALT  <5<L>  /  AlkPhos  92  02-07    RADIOLOGY & ADDITIONAL STUDIES:    ACC: [id number] EXAM:  XR FEEDING TUBE CHECK [facility]                          PROCEDURE DATE:  02/07/2022      INTERPRETATION:  CLINICAL INDICATION: Patient pulled out their PEG tube   with a Javier catheter in place. Confirm placement.    TECHNIQUE:Frontal view of the abdomen.    COMPARISON: Abdominal x-ray 1/23/2022.    FINDINGS:    Contrast is noted to opacify catheter overlying the stomach with contrast   also opacifying the stomach and proximal duodenum. No extravasation of   contrast on this single view study.    Nonobstructive bowel gas pattern.    Clips in the region of the left upper quadrant and partially imaged   sternal wires.    Degenerative changes of the spine.      IMPRESSION:    Contrast opacifies the stomach and proximal duodenum with no   extravasation or obstruction of contrast on this single view study.    --- End of Report ---    WALKER SPARROW MD; Resident Radiologist  This document has been electronically signed.  RUPAL CUMMINGS MD; Attending Radiologist  This document has been electronically signed. Feb 7 2022  2:13PM

## 2022-02-08 NOTE — PROGRESS NOTE ADULT - ASSESSMENT
ASSESSMENT:   71M w/ HTN, DM2, CAD-CABG, Parkinson's disease, and advanced CKD, 10/21/21 p/w LE swelling, c/b COVID; now newly ESRD-HD as of this admission    (1)Renal - ESRD-HD TTS - due for HD today after permacath placement   (2)Hyponatremia - higher Na+ bath with dialysis;   (3)Anemia- hgb improving    RECOMMEND  (1)HD today; Please do not use AVF(maturing). Use perm cath     (2)Nutritional support once peg replaced    Natividad Friedman, NP-C  [facility]  [phone]

## 2022-02-08 NOTE — PROGRESS NOTE ADULT - ASSESSMENT
71 M w volume overload, GI consulted for drop in hgb    1. CHF per cardio    2. ABBY on CKD per renal  -on HD via permacath per renal, midodrine during dialysis  -AVF not functional   -s/p non-tunneled HD catheter insertion 2/4.  -planned for tunneled catheter today 2/9    3. RP bleed  -s/p Abdominal Angiography and Embolization on 10/29/2021 by Interventional radiology of l4and l5 lumbar artery     4. Failure to thrive  -s/p PEG 1/10    -patient pulled out PEG yesterday; 16F johnson placed to keep tract open by medicine PA   -IR consulted for PEG replacement with IR; to be replaced possibly Tuesday  -maintain mittens     5. stress duodenal ulcers  -PPI BID    6. Constipation  -resolved  -nonobstructive gas pattern on x-ray 1/13  -continue Miralax BID  -DC planning to rehab underway     [facility]  Gastroenterology and Hepatology  [address] [place], [place]  Office: [phone]  Cell: [phone]

## 2022-02-09 NOTE — PROGRESS NOTE ADULT - ASSESSMENT
ASSESSMENT:   71M w/ HTN, DM2, CAD-CABG, Parkinson's disease, and advanced CKD, 10/21/21 p/w LE swelling, c/b COVID; now newly ESRD-HD as of this admission    (1)Renal - ESRD-HD TTS - due for HD today after permacath placement   (2)Hyponatremia - higher Na+ bath with dialysis;   (3)Anemia- hgb improving    RECOMMEND  (1)HD in am ; Please do not use AVF(maturing). Use  shiley and perm cath in am     (2)Nutritional support once peg replaced    His mental status is somulent...however when off the seroquel he pulls out things      [facility]  [phone]

## 2022-02-09 NOTE — CONSULT NOTE ADULT - CONSULT REQUESTED DATE/TIME
24-Jan-2022 15:07
26-Oct-2021 10:43
03-Nov-2021 17:50
08-Nov-2021 17:11
12-Nov-2021 15:05
02-Nov-2021 13:04
13-Dec-2021 14:27
21-Oct-2021
21-Oct-2021 16:44
27-Oct-2021 10:25
09-Feb-2022 09:58
22-Oct-2021 12:42
25-Oct-2021 16:36
26-Jan-2022 15:39
27-Oct-2021 22:07
29-Oct-2021 13:51
01-Feb-2022 14:50
29-Jan-2022 12:01
22-Oct-2021 09:24

## 2022-02-09 NOTE — CONSULT NOTE ADULT - PROVIDER SPECIALTY LIST ADULT
Intervent Radiology
Gastroenterology
Pulmonology
Wound Care
Intervent Radiology
Neurology
Palliative Care
Vascular Surgery
Intervent Radiology
Heme/Onc
Infectious Disease
Intervent Radiology
Nephrology
Surgery
Gastroenterology
Podiatry
Cardiology
Endocrinology

## 2022-02-09 NOTE — CONSULT NOTE ADULT - CONSULT REQUESTED BY NAME
Dr. Lucina Rogers
Primary team
Dr. Pineda
Dr Rai
Dr. Augustin
Dr. ac
MICU
Medicine
Divina Jacinto
Dr Ferrell
Dr Pineda
Dr. Rogers
Goyo Zamorano
Ken
Marika Pineda
permcatch removal
primary
Miriam
Silverio Tee

## 2022-02-09 NOTE — PROGRESS NOTE ADULT - SUBJECTIVE AND OBJECTIVE BOX
Chief complaint  Patient is a 71y old  Male who presents with a chief complaint of leg swelling (09 Feb 2022 12:11)   Review of systems  Patient in bed, looks comfortable, no hypoglycemic episodes.    Labs and Fingersticks  CAPILLARY BLOOD GLUCOSE      POCT Blood Glucose.: 85 mg/dL (09 Feb 2022 11:37)  POCT Blood Glucose.: 84 mg/dL (09 Feb 2022 06:29)  POCT Blood Glucose.: 85 mg/dL (09 Feb 2022 01:23)  POCT Blood Glucose.: 111 mg/dL (08 Feb 2022 21:24)  POCT Blood Glucose.: 73 mg/dL (08 Feb 2022 20:13)  POCT Blood Glucose.: 75 mg/dL (08 Feb 2022 17:33)      Anion Gap, Serum: 17 (02-09 @ 12:39)  Anion Gap, Serum: 14 (02-08 @ 05:40)      Calcium, Total Serum: 9.2 (02-09 @ 12:39)  Calcium, Total Serum: 9.6 (02-08 @ 05:40)          02-09    140  |  100  |  50<H>  ----------------------------<  90  4.0   |  23  |  3.11<H>    Ca    9.2      09 Feb 2022 12:39                          9.4    9.14  )-----------( 352      ( 09 Feb 2022 12:38 )             31.5     Medications  MEDICATIONS  (STANDING):  atorvastatin 80 milliGRAM(s) Oral at bedtime  carbidopa/levodopa  25/100 2.5 Tablet(s) Oral <User Schedule>  carbidopa/levodopa  25/100 2 Tablet(s) Oral <User Schedule>  chlorhexidine 4% Liquid 1 Application(s) Topical <User Schedule>  cinacalcet 60 milliGRAM(s) Oral daily  dextrose 40% Gel 15 Gram(s) Oral once  dextrose 5%. 1000 milliLiter(s) (50 mL/Hr) IV Continuous <Continuous>  dextrose 5%. 1000 milliLiter(s) (100 mL/Hr) IV Continuous <Continuous>  dextrose 5%. 1000 milliLiter(s) (40 mL/Hr) IV Continuous <Continuous>  dextrose 50% Injectable 25 Gram(s) IV Push once  dextrose 50% Injectable 12.5 Gram(s) IV Push once  dextrose 50% Injectable 25 Gram(s) IV Push once  dextrose 50% Injectable 12.5 Gram(s) IV Push once  dextrose 50% Injectable 25 Gram(s) IV Push once  diVALproex Sprinkle 250 milliGRAM(s) Oral three times a day  DULoxetine 20 milliGRAM(s) Oral daily  folic acid 1 milliGRAM(s) Oral daily  glucagon  Injectable 1 milliGRAM(s) IntraMuscular once  insulin lispro (ADMELOG) corrective regimen sliding scale   SubCutaneous every 6 hours  levothyroxine 25 MICROGram(s) Oral daily  memantine 5 milliGRAM(s) Oral at bedtime  metoprolol tartrate Injectable 5 milliGRAM(s) IV Push every 6 hours  midodrine 10 milliGRAM(s) Oral <User Schedule>  mirtazapine 7.5 milliGRAM(s) Oral daily  Nephro-celeste 1 Tablet(s) Oral daily  pantoprazole   Suspension 40 milliGRAM(s) Enteral Tube two times a day  QUEtiapine 25 milliGRAM(s) Oral at bedtime  tamsulosin 0.4 milliGRAM(s) Oral at bedtime  trihexyphenidyl 2 milliGRAM(s) Oral three times a day      Physical Exam  General: Patient comfortable in bed  Vital Signs Last 12 Hrs  T(F): 97.5 (02-09-22 @ 11:00), Max: 97.5 (02-09-22 @ 04:55)  HR: 120 (02-09-22 @ 11:00) (91 - 120)  BP: 153/97 (02-09-22 @ 11:00) (152/96 - 157/97)  BP(mean): --  RR: 21 (02-09-22 @ 11:00) (20 - 21)  SpO2: 97% (02-09-22 @ 11:00) (92% - 97%)  Neck: No palpable thyroid nodules.  CVS: S1S2, No murmurs  Respiratory: No wheezing, no crepitations  GI: Abdomen soft, bowel sounds positive  Musculoskeletal:  edema lower extremities.   Skin: No skin rashes, no ecchymosis    Diagnostics           Chief complaint  Patient is a 71y old  Male who presents with a chief complaint of leg swelling (09 Feb 2022 12:11)   Review of systems  Patient in bed, looks comfortable, no hypoglycemic episodes.    Labs and Fingersticks  CAPILLARY BLOOD GLUCOSE      POCT Blood Glucose.: 85 mg/dL (09 Feb 2022 11:37)  POCT Blood Glucose.: 84 mg/dL (09 Feb 2022 06:29)  POCT Blood Glucose.: 85 mg/dL (09 Feb 2022 01:23)  POCT Blood Glucose.: 111 mg/dL (08 Feb 2022 21:24)  POCT Blood Glucose.: 73 mg/dL (08 Feb 2022 20:13)  POCT Blood Glucose.: 75 mg/dL (08 Feb 2022 17:33)      Anion Gap, Serum: 17 (02-09 @ 12:39)  Anion Gap, Serum: 14 (02-08 @ 05:40)      Calcium, Total Serum: 9.2 (02-09 @ 12:39)  Calcium, Total Serum: 9.6 (02-08 @ 05:40)          02-09    140  |  100  |  50<H>  ----------------------------<  90  4.0   |  23  |  3.11<H>    Ca    9.2      09 Feb 2022 12:39                          9.4    9.14  )-----------( 352      ( 09 Feb 2022 12:38 )             31.5     Medications  MEDICATIONS  (STANDING):  atorvastatin 80 milliGRAM(s) Oral at bedtime  carbidopa/levodopa  25/100 2.5 Tablet(s) Oral <User Schedule>  carbidopa/levodopa  25/100 2 Tablet(s) Oral <User Schedule>  chlorhexidine 4% Liquid 1 Application(s) Topical <User Schedule>  cinacalcet 60 milliGRAM(s) Oral daily  dextrose 40% Gel 15 Gram(s) Oral once  dextrose 5%. 1000 milliLiter(s) (50 mL/Hr) IV Continuous <Continuous>  dextrose 5%. 1000 milliLiter(s) (100 mL/Hr) IV Continuous <Continuous>  dextrose 5%. 1000 milliLiter(s) (40 mL/Hr) IV Continuous <Continuous>  dextrose 50% Injectable 25 Gram(s) IV Push once  dextrose 50% Injectable 12.5 Gram(s) IV Push once  dextrose 50% Injectable 25 Gram(s) IV Push once  dextrose 50% Injectable 12.5 Gram(s) IV Push once  dextrose 50% Injectable 25 Gram(s) IV Push once  diVALproex Sprinkle 250 milliGRAM(s) Oral three times a day  DULoxetine 20 milliGRAM(s) Oral daily  folic acid 1 milliGRAM(s) Oral daily  glucagon  Injectable 1 milliGRAM(s) IntraMuscular once  insulin lispro (ADMELOG) corrective regimen sliding scale   SubCutaneous every 6 hours  levothyroxine 25 MICROGram(s) Oral daily  memantine 5 milliGRAM(s) Oral at bedtime  metoprolol tartrate Injectable 5 milliGRAM(s) IV Push every 6 hours  midodrine 10 milliGRAM(s) Oral <User Schedule>  mirtazapine 7.5 milliGRAM(s) Oral daily  Nephro-celeste 1 Tablet(s) Oral daily  pantoprazole   Suspension 40 milliGRAM(s) Enteral Tube two times a day  QUEtiapine 25 milliGRAM(s) Oral at bedtime  tamsulosin 0.4 milliGRAM(s) Oral at bedtime  trihexyphenidyl 2 milliGRAM(s) Oral three times a day      Physical Exam  General: Patient comfortable in bed  Vital Signs Last 12 Hrs  T(F): 97.5 (02-09-22 @ 11:00), Max: 97.5 (02-09-22 @ 04:55)  HR: 120 (02-09-22 @ 11:00) (91 - 120)  BP: 153/97 (02-09-22 @ 11:00) (152/96 - 157/97)  BP(mean): --  RR: 21 (02-09-22 @ 11:00) (20 - 21)  SpO2: 97% (02-09-22 @ 11:00) (92% - 97%)  Neck: No palpable thyroid nodules.  CVS: S1S2, No murmurs  Respiratory: No wheezing, no crepitations  GI: Abdomen soft, bowel sounds positive  Musculoskeletal:  edema lower extremities.   Skin: No skin rashes, no ecchymosis    Diagnostics

## 2022-02-09 NOTE — PROGRESS NOTE ADULT - SUBJECTIVE AND OBJECTIVE BOX
Chief Complaint:  Patient is a 71y old  Male who presents with a chief complaint of leg swelling (09 Feb 2022 10:06)      Date of service 02-09-22 @ 12:12      Interval Events:   Patient seen and examined.   PEG rescheduled with IR for 2/10.  BM 2/8.     Hospital Medications:  acetaminophen     Tablet .. 650 milliGRAM(s) Oral every 6 hours PRN  albuterol/ipratropium for Nebulization 3 milliLiter(s) Nebulizer every 6 hours PRN  atorvastatin 80 milliGRAM(s) Oral at bedtime  carbidopa/levodopa  25/100 2.5 Tablet(s) Oral <User Schedule>  carbidopa/levodopa  25/100 2 Tablet(s) Oral <User Schedule>  chlorhexidine 4% Liquid 1 Application(s) Topical <User Schedule>  cinacalcet 60 milliGRAM(s) Oral daily  dextrose 40% Gel 15 Gram(s) Oral once  dextrose 5%. 1000 milliLiter(s) IV Continuous <Continuous>  dextrose 5%. 1000 milliLiter(s) IV Continuous <Continuous>  dextrose 5%. 1000 milliLiter(s) IV Continuous <Continuous>  dextrose 50% Injectable 25 Gram(s) IV Push once  dextrose 50% Injectable 12.5 Gram(s) IV Push once  dextrose 50% Injectable 25 Gram(s) IV Push once  dextrose 50% Injectable 12.5 Gram(s) IV Push once  dextrose 50% Injectable 25 Gram(s) IV Push once  diVALproex Sprinkle 125 milliGRAM(s) Oral every 8 hours PRN  diVALproex Sprinkle 250 milliGRAM(s) Oral three times a day  DULoxetine 20 milliGRAM(s) Oral daily  folic acid 1 milliGRAM(s) Oral daily  glucagon  Injectable 1 milliGRAM(s) IntraMuscular once  guaiFENesin Oral Liquid (Sugar-Free) 200 milliGRAM(s) Oral every 6 hours PRN  insulin lispro (ADMELOG) corrective regimen sliding scale   SubCutaneous every 6 hours  levothyroxine 25 MICROGram(s) Oral daily  memantine 5 milliGRAM(s) Oral at bedtime  metoprolol tartrate Injectable 5 milliGRAM(s) IV Push every 6 hours  midodrine 10 milliGRAM(s) Oral <User Schedule>  mirtazapine 7.5 milliGRAM(s) Oral daily  Nephro-celeste 1 Tablet(s) Oral daily  ondansetron Injectable 4 milliGRAM(s) IV Push once PRN  pantoprazole   Suspension 40 milliGRAM(s) Enteral Tube two times a day  QUEtiapine 25 milliGRAM(s) Oral at bedtime  QUEtiapine 12.5 milliGRAM(s) Oral every 6 hours PRN  sodium chloride 0.9% lock flush 10 milliLiter(s) IV Push every 1 hour PRN  tamsulosin 0.4 milliGRAM(s) Oral at bedtime  trihexyphenidyl 2 milliGRAM(s) Oral three times a day        Review of Systems:  unable to obtain    PHYSICAL EXAM:   Vital Signs:  Vital Signs Last 24 Hrs  T(C): 36.4 (09 Feb 2022 11:00), Max: 36.4 (08 Feb 2022 20:36)  T(F): 97.5 (09 Feb 2022 11:00), Max: 97.6 (08 Feb 2022 20:36)  HR: 120 (09 Feb 2022 11:00) (69 - 120)  BP: 153/97 (09 Feb 2022 11:00) (143/95 - 157/97)  BP(mean): --  RR: 21 (09 Feb 2022 11:00) (20 - 21)  SpO2: 97% (09 Feb 2022 11:00) (92% - 97%)  Daily     Daily       PHYSICAL EXAM:     GENERAL:  Appears stated age, well-groomed, well-nourished, no distress  HEENT:  NC/AT,  conjunctivae anicteric, clear and pink,   NECK: supple, trachea midline  CHEST:  Full & symmetric excursion, no increased effort, breath sounds clear  HEART:  Regular rhythm, no JVD  ABDOMEN:  Soft, non-tender, non-distended, normoactive bowel sounds,  no masses , no hepatosplenomegaly  EXTREMITIES:  no cyanosis,clubbing or edema  SKIN:  No rash, erythema, or, ecchymoses, no jaundice  NEURO:  non-focal, no asterixis  RECTAL: Deferred      LABS Personally reviewed by me:                        9.7    10.01 )-----------( 377      ( 08 Feb 2022 05:40 )             31.4       02-08    135  |  97  |  81<H>  ----------------------------<  80  4.4   |  24  |  4.34<H>    Ca    9.6      08 Feb 2022 05:40                                    9.7    10.01 )-----------( 377      ( 08 Feb 2022 05:40 )             31.4                         9.6    10.88 )-----------( 326      ( 07 Feb 2022 06:20 )             31.1       Imaging personally reviewed by me:

## 2022-02-09 NOTE — CHART NOTE - NSCHARTNOTEFT_GEN_A_CORE
Nutrition Follow Up Note  Patient seen for: malnutrition TF follow up    Chart reviewed, events noted. "71M w/ HTN, DM2, CAD-CABG, Parkinson's disease, and advanced CKD, 10/21/21 p/w LE swelling, c/b COVID; now newly ESRD-HD as of this admission.  GI consulted for drop in hgb. ABBY on CKD per renal-on HD via permacath per renal, midodrine during dialysis Pt s/p non-tunneled HD catheter insertion . Pt planned for tunneled catheter today 2/10. Pt s/p PEG 1/10, Pt pulled PEG, IR consulted for PEG replacement with IR; to be replaced 2/10. Dextrose 5% at 40cc/hr while pt is NPO"    Source: [] Patient       [x] EMR        [x] RN        [] Family at bedside       [] Other:    -If unable to interview patient: [] Trach/Vent/BiPAP  [x] Disoriented/confused/inappropriate to interview (pt A&O X1)    Diet Order:   Diet, NPO after Midnight:      NPO Start Date: 2022,   NPO Start Time: 23:59 (22)  Diet, NPO with Tube Feed:   Tube Feeding Modality: Gastrostomy  Nepro with Carb Steady (NEPRORTH)  Total Volume for 24 Hours (mL): 1980  Bolus  Total Volume of Bolus (mL):  330  Total # of Feeds: 4  Tube Feed Frequency: Every 4 hours   Tube Feed Start Time: 16:00  Bolus Feed Rate (mL per Hour): 330   Bolus Feed Duration (in Hours): 1  Bolus Feed Instructions:  Nepro start @ 120 ml bolus, increase 120 ml per bolus as tolerated to GOAL bolus 330 ml 4x daily (08:00, 12:00, 16:00, 20:00).  Free Water Flush Instructions:  defer to team (22)    - Is current order appropriate/adequate? [] Yes  []  No:    - not receiving TF related to pulling PEG out , pending PEG replacement tomorrow and resume feeds planned.     - PO intake :   [] >75%  Adequate    [] 50-75%  Fair       [] <50%  Poor   - per flow sheet and per RN today, prior to pulling PEG out pt was tolerating TF at goal volume (Nepro 330ml x4 can/day).   - TF amounts provided to pt per flow sheet:     (-)     (1980 ml/day)     (330 ml/day)    2/3 (-)    - Nutrition-related concerns:   - Pt pulled PEG out ; NPO since; per GI note  PEG rescheduled with IR for 2/10.    - Noted pt receiving D5% continues @ 40ml/hr x24hr which provides 163.2 kcal/day.   - Phos was low per last RD visit; per last lab results Phos WNL (2.5 )  - Pt with history of DM2, Endo is following and per D5% today "Will continue coverage scale, monitoring FS and FU."  - Per chart, pt ordered for SSI admelog, sinemet, Synthroid (PO), remeron, Nephro-celeste, folic acid  - Per chart, Sinemet is ordered for 06:00, 14:00, & 22:00.  - Per chart review pt disoriented, confused A&O X1    GI:  Last BM    Bowel Regimen? [] Yes   [x] No  : bowel regimen d/c'd due to loose stool   NP denies pt with vomiting, diarrhea, or constipation.     Weights:   -Dosing wt: 96.4 kg (12-14)  -Daily Weight in k.9 (-), Weight in k.1 (-), Weight in k.6 (-)    -most recent POST HD wt as per flowsheets:    75.1 kg     -Suspect wt changes due to HD fluid shifts. RD to continue to monitor weight trends      Nutritionally Pertinent MEDICATIONS  (STANDING):  atorvastatin  cinacalcet  dextrose 40% Gel  dextrose 5%.  dextrose 5%.  dextrose 5%.  dextrose 50% Injectable  dextrose 50% Injectable  dextrose 50% Injectable  dextrose 50% Injectable  dextrose 50% Injectable  folic acid  glucagon  Injectable  insulin lispro (ADMELOG) corrective regimen sliding scale  levothyroxine  metoprolol tartrate Injectable  midodrine  Nephro-celeste  pantoprazole   Suspension  tamsulosin    Pertinent Labs:  @ 12:39: Na 140, BUN 50<H>, Cr 3.11<H>, BG 90, K+ 4.0, Phos --, Mg --, Alk Phos --, ALT/SGPT --, AST/SGOT --, HbA1c --    A1C with Estimated Average Glucose Result: 5.8 % (22 @ 09:27)  A1C with Estimated Average Glucose Result: 6.4 % (10-22-21 @ 08:56)    Finger Sticks:  POCT Blood Glucose.: 85 mg/dL ( @ 11:37)  POCT Blood Glucose.: 84 mg/dL ( @ 06:29)  POCT Blood Glucose.: 85 mg/dL ( @ 01:23)  POCT Blood Glucose.: 111 mg/dL ( @ 21:24)  POCT Blood Glucose.: 73 mg/dL ( @ 20:13)  POCT Blood Glucose.: 75 mg/dL ( @ 17:33)    Skin per nursing documentation:  no pressure injuries   Edema: +4 edema L hand as per flowsheets     Estimated Needs:   [x] no change since previous assessment  3267-5002 kcal/day (30-35 kcal/kg)   gm protein/day (1.2-1.4 g/kg)  Defer fluid needs to team  based on IBW 78 kg    Previous Nutrition Diagnosis: Increased Nutrient Needs; Food and Nutrition Related Knowledge Deficit;  Severe acute on chronic malnutrition  Nutrition Diagnosis is: [x] ongoing  [] resolved [] not applicable   Being addressed with tube feeding regimen via PEG and micronutrient supplementation    New Nutrition Diagnosis: Inadequate energy intake related to PEG pulled out by pt as evidenced by pt NPO no feeds at this time     Nutrition Care Plan:  [x] In Progress  [] Achieved  [] Not applicable    Nutrition Interventions:     Education Provided:       [] Yes:  [x] No: Not appropriate at this time.       Recommendations:      1) When medically feasible (after PEG replacement and when feeding resume) recommend Nepro bolus 120 ml increase 120 ml per bolus as tolerated to goal bolus 330 ml 4x daily (08:00, 12:00, 16:00, 20:00) with consideration for Sinemet administration. Defer free water flushes to team. Total regimen to provide 1320 ml total volume, 2347 kcal, 107 g pro, 960 ml free water. Provides 31 kcal/kg, 1.4 g pro/kg based on most recent post HD weight 75.1 kg (2/). Meets >100% RDIs.  2) Monitor electrolytes; Continue to check phos.  3) Continue Nephro-celeste and Folic acid if no medical contraindications as medically appropriate.  4) RD remains available to adjust EN Regimen as needed.    Monitoring and Evaluation:   Continue to monitor nutritional intake, tolerance to diet prescription, weights, BM, labs, skin integrity    RD remains available upon request and will follow up per protocol  Dunia Yoo RD CDN #[phone]

## 2022-02-09 NOTE — CHART NOTE - NSCHARTNOTEFT_GEN_A_CORE
Pt continues to attempt to pull IV lines and tubings - Pt has a shiley and johnson in place of a PEG.  Pt continues to need bilateral unsecured mittens    [id number]

## 2022-02-09 NOTE — PROGRESS NOTE ADULT - SUBJECTIVE AND OBJECTIVE BOX
Patient is a 71y old  Male who presents with a chief complaint of leg swelling (09 Feb 2022 15:09)      INTERVAL HPI/OVERNIGHT EVENTS: noted  pt seen and examined this am   events noted  no significant events      Vital Signs Last 24 Hrs  T(C): 36.5 (09 Feb 2022 20:15), Max: 36.5 (09 Feb 2022 20:15)  T(F): 97.7 (09 Feb 2022 20:15), Max: 97.7 (09 Feb 2022 20:15)  HR: 111 (09 Feb 2022 20:15) (91 - 120)  BP: 160/87 (09 Feb 2022 20:15) (143/95 - 160/87)  BP(mean): --  RR: 20 (09 Feb 2022 20:15) (20 - 21)  SpO2: 94% (09 Feb 2022 20:15) (92% - 97%)    acetaminophen     Tablet .. 650 milliGRAM(s) Oral every 6 hours PRN  albuterol/ipratropium for Nebulization 3 milliLiter(s) Nebulizer every 6 hours PRN  atorvastatin 80 milliGRAM(s) Oral at bedtime  carbidopa/levodopa  25/100 2.5 Tablet(s) Oral <User Schedule>  carbidopa/levodopa  25/100 2 Tablet(s) Oral <User Schedule>  chlorhexidine 4% Liquid 1 Application(s) Topical <User Schedule>  cinacalcet 60 milliGRAM(s) Oral daily  dextrose 40% Gel 15 Gram(s) Oral once  dextrose 5%. 1000 milliLiter(s) IV Continuous <Continuous>  dextrose 5%. 1000 milliLiter(s) IV Continuous <Continuous>  dextrose 5%. 1000 milliLiter(s) IV Continuous <Continuous>  dextrose 50% Injectable 25 Gram(s) IV Push once  dextrose 50% Injectable 12.5 Gram(s) IV Push once  dextrose 50% Injectable 25 Gram(s) IV Push once  dextrose 50% Injectable 12.5 Gram(s) IV Push once  dextrose 50% Injectable 25 Gram(s) IV Push once  diVALproex Sprinkle 125 milliGRAM(s) Oral every 8 hours PRN  diVALproex Sprinkle 250 milliGRAM(s) Oral three times a day  DULoxetine 20 milliGRAM(s) Oral daily  folic acid 1 milliGRAM(s) Oral daily  glucagon  Injectable 1 milliGRAM(s) IntraMuscular once  guaiFENesin Oral Liquid (Sugar-Free) 200 milliGRAM(s) Oral every 6 hours PRN  insulin lispro (ADMELOG) corrective regimen sliding scale   SubCutaneous every 6 hours  levothyroxine Injectable 12.5 MICROGram(s) IV Push <User Schedule>  memantine 5 milliGRAM(s) Oral at bedtime  metoprolol tartrate Injectable 5 milliGRAM(s) IV Push every 6 hours  midodrine 10 milliGRAM(s) Oral <User Schedule>  mirtazapine 7.5 milliGRAM(s) Oral daily  Nephro-celeste 1 Tablet(s) Oral daily  ondansetron Injectable 4 milliGRAM(s) IV Push once PRN  pantoprazole  Injectable 40 milliGRAM(s) IV Push two times a day  QUEtiapine 25 milliGRAM(s) Oral at bedtime  QUEtiapine 12.5 milliGRAM(s) Oral every 6 hours PRN  sodium chloride 0.9% lock flush 10 milliLiter(s) IV Push every 1 hour PRN  tamsulosin 0.4 milliGRAM(s) Oral at bedtime  trihexyphenidyl 2 milliGRAM(s) Oral three times a day      PHYSICAL EXAM:  GENERAL: NAD,   EYES: conjunctiva and sclera clear  ENMT: Moist mucous membranes  NECK: Supple, No JVD, Normal thyroid  CHEST/LUNG: non labored, cta b/l  HEART: Regular rate and rhythm; No murmurs, rubs, or gallops  ABDOMEN: Soft, Nontender, Nondistended; Bowel sounds present  EXTREMITIES:  2+ Peripheral Pulses, No clubbing, cyanosis, or edema  LYMPH: No lymphadenopathy noted  SKIN: No rashes or lesions    Consultant(s) Notes Reviewed:  [x ] YES  [ ] NO  Care Discussed with Consultants/Other Providers [ x] YES  [ ] NO    LABS:                        9.4    9.14  )-----------( 352      ( 09 Feb 2022 12:38 )             31.5     02-09    140  |  100  |  50<H>  ----------------------------<  90  4.0   |  23  |  3.11<H>    Ca    9.2      09 Feb 2022 12:39          CAPILLARY BLOOD GLUCOSE      POCT Blood Glucose.: 116 mg/dL (09 Feb 2022 16:56)  POCT Blood Glucose.: 85 mg/dL (09 Feb 2022 11:37)  POCT Blood Glucose.: 84 mg/dL (09 Feb 2022 06:29)  POCT Blood Glucose.: 85 mg/dL (09 Feb 2022 01:23)              RADIOLOGY & ADDITIONAL TESTS:    Imaging Personally Reviewed:  [x ] YES  [ ] NO

## 2022-02-09 NOTE — PROGRESS NOTE ADULT - ASSESSMENT
71 M w volume overload, GI consulted for drop in hgb    1. CHF per cardio    2. BABY on CKD per renal  -on HD via permacath per renal, midodrine during dialysis  -AVF not functional   -s/p non-tunneled HD catheter insertion 2/4.  -planned for tunneled catheter today 2/9    3. RP bleed  -s/p Abdominal Angiography and Embolization on 10/29/2021 by Interventional radiology of l4and l5 lumbar artery     4. Failure to thrive  -s/p PEG 1/10    -patient pulled out PEG yesterday; 16F johnson placed to keep tract open by medicine PA   -IR consulted for PEG replacement with IR; to be replaced 2/10  -maintain mittens     5. stress duodenal ulcers  -PPI BID    6. Constipation  -resolved  -nonobstructive gas pattern on x-ray 1/13  -continue Miralax BID  -DC planning to rehab underway     [facility]  Gastroenterology and Hepatology  [address] [place], [place]  Office: [phone]  Cell: [phone] 71 M w volume overload, GI consulted for drop in hgb    1. CHF per cardio    2. ABBY on CKD per renal  -on HD via permacath per renal, midodrine during dialysis  -AVF not functional   -s/p non-tunneled HD catheter insertion 2/4.  -planned for tunneled catheter today 2/10    3. RP bleed  -s/p Abdominal Angiography and Embolization on 10/29/2021 by Interventional radiology of l4and l5 lumbar artery     4. Failure to thrive  -s/p PEG 1/10    -patient pulled out PEG yesterday; 16F johnson placed to keep tract open by medicine PA   -IR consulted for PEG replacement with IR; to be replaced 2/10  -maintain mittens     5. stress duodenal ulcers  -PPI BID    6. Constipation  -resolved  -nonobstructive gas pattern on x-ray 1/13  -continue Miralax BID  -DC planning to rehab underway     [facility]  Gastroenterology and Hepatology  [address] [place], [place]  Office: [phone]  Cell: [phone]

## 2022-02-09 NOTE — CHART NOTE - NSCHARTNOTEFT_GEN_A_CORE
Pt with Concepcion in PEG stoma - waiting for PEG replacement by IR rescheduled on 2/10/21. Pt is NPO   Discussed with Dr. Frias  Dextrose 5% at 40cc/hr while pt is NPO    [id number]

## 2022-02-09 NOTE — PROGRESS NOTE ADULT - ASSESSMENT
72yo M w/ PMHx of CAD (s/p CABG 2019), CKD (unknown stage), DM2, Parkinson's Disease, HTN, depression presents with new onset acute heart failure exacerbation, NSTEMI, started on hep gtt, developed bl RP bleed, acute anemia. sp MICU course for hemorrhagic shock, 10/28 sp IR embolization l4 and l5 lumbar artery. sp permacath and AVF.    # chronic systolic and diastolic heart failure  # ESRD, new HD  # Atrial flutter  # Parkinson's disease   # delirium  # CAD (coronary artery disease)  # HTN  # DM2 (diabetes mellitus, type 2)  # FTT sp PEG  midodrine during dialysis  sp L AVF using for HD, permacath dced  1/29 malfunctioning Lt AVF - 2/2 sp fistulogram and angioplasty by IR - nonfunctioning  sp shiley 2/4  insulin per endo  afib rvr as missed PO medication, cont lopressor, atorvastatin - can give by mouth now that no PEG, PEG not for dysphagia   2/6 pt pulled out peg tube   IR for permacath and PEG re placement?tentative for am  need to revisit the need for these procedures which are can be risky and dangerous in current clinical setting  will have goc discussed w family again  palliative reconsult      DVT ppx hsq    DNR/DNI    updated son HCP Branden over the phone 2/7  - confirmed family still wants all aggressive measures and to cont dialysis    Dr. Pineda will take over tomorrow.  Please contact with any questions or concerns [phone].

## 2022-02-09 NOTE — CONSULT NOTE ADULT - ASSESSMENT
Interventional Radiology    Evaluate for Procedure: Tunneled dialysis catheter, PEG Tube replacement    HPI: 71y Male with DM2, Parkinson's Disease presents with new onset acute heart failure exacerbation, NSTEMI, started on hep gtt, developed bl RP bleed, acute anemia. sp MICU course for hemorrhagic shock.10/28 sp IR embolization l4 and l5 lumbar artery. S/p nontunneled HD cath placed 2/4. PEG tube placed on 1/10, pulled out on 2/6, 16Fr johnson currently in place to keep tract open.      Allergies: adhesives (Rash)  latex (Urticaria)    Medications (Abx/Cardiac/Anticoagulation/Blood Products)    metoprolol tartrate Injectable: 5 milliGRAM(s) IV Push (02-09 @ 05:39)    Data:    T(C): 36.4  HR: 91  BP: 157/97  RR: 20  SpO2: 95%    -WBC 10.01 / HgB 9.7 / Hct 31.4 / Plt 377  -Na 135 / Cl 97 / BUN 81 / Glucose 80  -K 4.4 / CO2 24 / Cr 4.34  -ALT -- / Alk Phos -- / T.Bili --  -INR 1.05 / PTT --      Radiology: Chest and abd xray 2/7; Contrast opacifies the stomach and proximal duodenum with no extravasation or obstruction of contrast on this single view study.    Assessment/Plan:   71y Male with DM2, Parkinson's Disease presents with new onset acute heart failure exacerbation, NSTEMI, started on hep gtt, developed bl RP bleed, acute anemia. sp MICU course for hemorrhagic shock.10/28 sp IR embolization l4 and l5 lumbar artery. S/p nontunneled HD cath placed 2/4. PEG tube placed on 1/10, pulled out on 2/6, 16Fr johnson currently in place to keep tract open.    -- IR will plan to perform tunneled dialysis catheter and PEG tube replacement on 2/10/22  -- NPO at midnight on 2/9/22  -- hold a.m. anticoagulation on 2/10  -- Please draw AM labs at 4AM (CBC/INR/BMP) 2/10  -- please place IR procedure request order Interventional Radiology    Evaluate for Procedure: Tunneled dialysis catheter, PEG Tube replacement    HPI: 71y Male with DM2, Parkinson's Disease presents with new onset acute heart failure exacerbation, NSTEMI, started on hep gtt, developed bl RP bleed, acute anemia. sp MICU course for hemorrhagic shock.10/28 sp IR embolization l4 and l5 lumbar artery. S/p nontunneled HD cath placed 2/4. PEG tube placed on 1/10, pulled out on 2/6, 16Fr johnson currently in place to keep tract open.      Allergies: adhesives (Rash)  latex (Urticaria)    Medications (Abx/Cardiac/Anticoagulation/Blood Products)    metoprolol tartrate Injectable: 5 milliGRAM(s) IV Push (02-09 @ 05:39)    Data:    T(C): 36.4  HR: 91  BP: 157/97  RR: 20  SpO2: 95%    -WBC 10.01 / HgB 9.7 / Hct 31.4 / Plt 377  -Na 135 / Cl 97 / BUN 81 / Glucose 80  -K 4.4 / CO2 24 / Cr 4.34  -ALT -- / Alk Phos -- / T.Bili --  -INR 1.05 / PTT --      Radiology: Chest and abdominal xray 2/7; Contrast opacifies the stomach and proximal duodenum with no extravasation or obstruction of contrast on this single view study.    Assessment/Plan:   71y Male with DM2, Parkinson's Disease presents with new onset acute heart failure exacerbation, NSTEMI, started on hep gtt, developed bl RP bleed, acute anemia. sp MICU course for hemorrhagic shock.10/28 sp IR embolization l4 and l5 lumbar artery. S/p nontunneled HD cath placed 2/4. PEG tube placed on 1/10, pulled out on 2/6, 16Fr johnson currently in place to keep tract open.    -- IR will plan to perform tunneled dialysis catheter and PEG tube replacement on 2/10/22  -- NPO at midnight on 2/9/22  -- hold a.m. anticoagulation on 2/10  -- Please draw AM labs at 4AM (CBC/INR/BMP) 2/10  -- please place IR procedure request order

## 2022-02-09 NOTE — CONSULT NOTE ADULT - CONSULT REASON
Anemia, decline in hgb
Malfunctioning AVF
malfunctioning AVF
AVF
Pleural Effusion/Hypoxemia
confusion
ABBY
RP hematoma
Tunneled HD catheter
prolonged PTT
High Blood Sugars/DMT2
Tunneled dialysis catheter, PEG tube replacement.
painful elongated toenails
r/o UTI
Non-tunneled HD catheter placement
RLE cellulitis
removal of permcath.  avf now matured
chf
Elderly patient with multiple medical issues, LACE 15, and at high risk for readmission. For GOC and resources.

## 2022-02-09 NOTE — PROGRESS NOTE ADULT - SUBJECTIVE AND OBJECTIVE BOX
NEPHROLOGY-[facility] [phone]        Patient seen and examined in bed.  He was the same and not very interactive         MEDICATIONS  (STANDING):  atorvastatin 80 milliGRAM(s) Oral at bedtime  carbidopa/levodopa  25/100 2.5 Tablet(s) Oral <User Schedule>  carbidopa/levodopa  25/100 2 Tablet(s) Oral <User Schedule>  chlorhexidine 4% Liquid 1 Application(s) Topical <User Schedule>  cinacalcet 60 milliGRAM(s) Oral daily  dextrose 40% Gel 15 Gram(s) Oral once  dextrose 5%. 1000 milliLiter(s) (50 mL/Hr) IV Continuous <Continuous>  dextrose 5%. 1000 milliLiter(s) (100 mL/Hr) IV Continuous <Continuous>  dextrose 50% Injectable 25 Gram(s) IV Push once  dextrose 50% Injectable 12.5 Gram(s) IV Push once  dextrose 50% Injectable 25 Gram(s) IV Push once  dextrose 50% Injectable 25 Gram(s) IV Push once  dextrose 50% Injectable 12.5 Gram(s) IV Push once  diVALproex Sprinkle 250 milliGRAM(s) Oral three times a day  DULoxetine 20 milliGRAM(s) Oral daily  folic acid 1 milliGRAM(s) Oral daily  glucagon  Injectable 1 milliGRAM(s) IntraMuscular once  insulin lispro (ADMELOG) corrective regimen sliding scale   SubCutaneous every 6 hours  levothyroxine 25 MICROGram(s) Oral daily  memantine 5 milliGRAM(s) Oral at bedtime  metoprolol tartrate Injectable 5 milliGRAM(s) IV Push every 6 hours  midodrine 10 milliGRAM(s) Oral <User Schedule>  mirtazapine 7.5 milliGRAM(s) Oral daily  Nephro-celeste 1 Tablet(s) Oral daily  pantoprazole   Suspension 40 milliGRAM(s) Enteral Tube two times a day  QUEtiapine 25 milliGRAM(s) Oral at bedtime  tamsulosin 0.4 milliGRAM(s) Oral at bedtime  trihexyphenidyl 2 milliGRAM(s) Oral three times a day      VITAL:  T(C): , Max: 36.8 (02-08-22 @ 10:57)  T(F): , Max: 98.2 (02-08-22 @ 10:57)  HR: 91 (02-09-22 @ 05:44)  BP: 157/97 (02-09-22 @ 05:44)  BP(mean): --  RR: 20 (02-09-22 @ 05:44)  SpO2: 95% (02-09-22 @ 05:44)  Wt(kg): --    I and O's:    02-08 @ 07:01  -  02-09 @ 07:00  --------------------------------------------------------  IN: 800 mL / OUT: 1400 mL / NET: -600 mL          PHYSICAL EXAM:    Constitutional: NAD  Neck:  No JVD  Respiratory: poor effort    Cardiovascular: S1 and S2  Gastrointestinal: BS+, soft, NT/ND  Extremities: No peripheral edema  Neurological:    : No Javier  Skin: No rashes  Access: subclavian     LABS:                        9.7    10.01 )-----------( [id number]      ( 08 Feb 2022 05:40 )             31.4     02-08    135  |  97  |  81<H>  ----------------------------<  80  4.4   |  24  |  4.34<H>    Ca    9.6      08 Feb 2022 05:40            Urine Studies:          RADIOLOGY & ADDITIONAL STUDIES:

## 2022-02-09 NOTE — PROGRESS NOTE ADULT - PROBLEM SELECTOR PLAN 1
Suggest to continue on D5% for now.  Will continue coverage scale, monitoring FS and FU.  Suggest DC on Tradjenta 5mg daily, discontinue prior hypoglycemic agents/insulin, endo FU 4 weeks.

## 2022-02-09 NOTE — CONSULT NOTE ADULT - REASON FOR ADMISSION
leg swelling

## 2022-02-09 NOTE — PROGRESS NOTE ADULT - ASSESSMENT
Assessment  DMT2: 71y Male with DM T2 with hyperglycemia, A1C 6.4%, was on insulin at home, DC'd Lantus dose yesterday, now on insulin coverage only, blood sugars trending within low-normal range, started on D5% while he is NPO, planning peg replacement tomorrow.  Hypothyroidism: Patient has no history thyroid disease, was not on any thyroid supplements, subclinical with low-normal FT4, lethargic, on synthroid 25 mcg po daily, repeat TFTs improved and euthyroid.  Hypercalcemia: Started on Sensipar 60mg daily, Ca improving.  CHF: on medications, stable, monitored.  HTN: Controlled,  on antihypertensive medications.  Parkinsons: on meds, monitored.  CKD: Monitor labs/BMP       Assessment  DMT2: 71y Male with DM T2 with hyperglycemia, A1C 6.4%, was on insulin at home, DC'd Lantus dose yesterday, now on insulin coverage only, blood sugars  trending within low-normal range, started on D5% while he is NPO, planning peg replacement tomorrow.  Hypothyroidism: Patient has no history thyroid disease, was not on any thyroid supplements, subclinical with low-normal FT4, lethargic, on synthroid 25 mcg po daily, repeat TFTs improved and euthyroid.  Hypercalcemia: Started on Sensipar 60mg daily, Ca improving.  CHF: on medications, stable, monitored.  HTN: Controlled,  on antihypertensive medications.  Parkinsons: on meds, monitored.  CKD: Monitor labs/BMP

## 2022-02-10 NOTE — BH CONSULTATION LIAISON PROGRESS NOTE - NSBHCONSULTPRIMARYDISCUSSYES_PSY_A_CORE FT
plan above d/w Yony 

## 2022-02-10 NOTE — BH CONSULTATION LIAISON PROGRESS NOTE - NSBHCONSFOLLOWNEEDS_PSY_ALL_CORE
No psychiatric contraindications to discharge

## 2022-02-10 NOTE — PROGRESS NOTE ADULT - SUBJECTIVE AND OBJECTIVE BOX
SUBJECTIVE / OVERNIGHT EVENTS:    no events overnight  patient seen and examined    --------------------------------------------------------------------------------------------  LABS:                        7.8    8.50  )-----------( 316      ( 10 Feb 2022 05:21 )             26.4     02-10    136  |  99  |  61<H>  ----------------------------<  134<H>  4.1   |  23  |  3.62<H>    Ca    9.8      10 Feb 2022 06:55      PT/INR - ( 10 Feb 2022 05:21 )   PT: 14.1 sec;   INR: 1.18 ratio           CAPILLARY BLOOD GLUCOSE      POCT Blood Glucose.: 120 mg/dL (10 Feb 2022 11:39)  POCT Blood Glucose.: 142 mg/dL (10 Feb 2022 06:32)  POCT Blood Glucose.: 144 mg/dL (10 Feb 2022 04:25)  POCT Blood Glucose.: 122 mg/dL (10 Feb 2022 00:08)  POCT Blood Glucose.: 116 mg/dL (09 Feb 2022 16:56)            RADIOLOGY & ADDITIONAL TESTS:    Imaging Personally Reviewed:  [x] YES  [ ] NO    Consultant(s) Notes Reviewed:  [x] YES  [ ] NO    MEDICATIONS  (STANDING):  atorvastatin 80 milliGRAM(s) Oral at bedtime  carbidopa/levodopa  25/100 2.5 Tablet(s) Oral <User Schedule>  carbidopa/levodopa  25/100 2 Tablet(s) Oral <User Schedule>  chlorhexidine 4% Liquid 1 Application(s) Topical <User Schedule>  cinacalcet 60 milliGRAM(s) Oral daily  dextrose 40% Gel 15 Gram(s) Oral once  dextrose 5%. 1000 milliLiter(s) (50 mL/Hr) IV Continuous <Continuous>  dextrose 5%. 1000 milliLiter(s) (100 mL/Hr) IV Continuous <Continuous>  dextrose 5%. 1000 milliLiter(s) (40 mL/Hr) IV Continuous <Continuous>  dextrose 50% Injectable 25 Gram(s) IV Push once  dextrose 50% Injectable 12.5 Gram(s) IV Push once  dextrose 50% Injectable 25 Gram(s) IV Push once  dextrose 50% Injectable 12.5 Gram(s) IV Push once  dextrose 50% Injectable 25 Gram(s) IV Push once  diVALproex Sprinkle 250 milliGRAM(s) Oral three times a day  DULoxetine 20 milliGRAM(s) Oral daily  folic acid 1 milliGRAM(s) Oral daily  glucagon  Injectable 1 milliGRAM(s) IntraMuscular once  insulin lispro (ADMELOG) corrective regimen sliding scale   SubCutaneous every 6 hours  levothyroxine Injectable 12.5 MICROGram(s) IV Push <User Schedule>  memantine 5 milliGRAM(s) Oral at bedtime  metoprolol tartrate Injectable 5 milliGRAM(s) IV Push every 6 hours  midodrine 10 milliGRAM(s) Oral <User Schedule>  mirtazapine 7.5 milliGRAM(s) Oral daily  Nephro-celeste 1 Tablet(s) Oral daily  pantoprazole  Injectable 40 milliGRAM(s) IV Push two times a day  QUEtiapine 25 milliGRAM(s) Oral at bedtime  tamsulosin 0.4 milliGRAM(s) Oral at bedtime  trihexyphenidyl 2 milliGRAM(s) Oral three times a day    MEDICATIONS  (PRN):  acetaminophen     Tablet .. 650 milliGRAM(s) Oral every 6 hours PRN Mild Pain (1 - 3)  albuterol/ipratropium for Nebulization 3 milliLiter(s) Nebulizer every 6 hours PRN Shortness of Breath and/or Wheezing  diVALproex Sprinkle 125 milliGRAM(s) Oral every 8 hours PRN Agitation  guaiFENesin Oral Liquid (Sugar-Free) 200 milliGRAM(s) Oral every 6 hours PRN Cough  ondansetron Injectable 4 milliGRAM(s) IV Push once PRN Nausea and/or Vomiting  QUEtiapine 12.5 milliGRAM(s) Oral every 6 hours PRN agitation  sodium chloride 0.9% lock flush 10 milliLiter(s) IV Push every 1 hour PRN Pre/post blood products, medications, blood draw, and to maintain line patency      Care Discussed with Consultants/Other Providers [x] YES  [ ] NO    Vital Signs Last 24 Hrs  T(C): 36.3 (10 Feb 2022 11:05), Max: 36.9 (10 Feb 2022 04:41)  T(F): 97.4 (10 Feb 2022 11:05), Max: 98.4 (10 Feb 2022 04:41)  HR: 122 (10 Feb 2022 11:05) (111 - 122)  BP: 160/97 (10 Feb 2022 11:05) (149/94 - 160/97)  BP(mean): --  RR: 20 (10 Feb 2022 11:05) (19 - 20)  SpO2: 93% (10 Feb 2022 11:05) (91% - 94%)  I&O's Summary    09 Feb 2022 07:01  -  10 Feb 2022 07:00  --------------------------------------------------------  IN: 280 mL / OUT: 250 mL / NET: 30 mL    10 Feb 2022 07:01  -  10 Feb 2022 14:01  --------------------------------------------------------  IN: 0 mL / OUT: 150 mL / NET: -150 mL    PHYSICAL EXAM:  GENERAL: NAD,   EYES: conjunctiva and sclera clear  ENMT: Moist mucous membranes  NECK: Supple, No JVD, Normal thyroid  CHEST/LUNG: non labored, cta b/l  HEART: Regular rate and rhythm; No murmurs, rubs, or gallops  ABDOMEN: Soft, Nontender, Nondistended; Bowel sounds present  EXTREMITIES:  2+ Peripheral Pulses, No clubbing, cyanosis, or edema  LYMPH: No lymphadenopathy noted  SKIN: No rashes or lesions

## 2022-02-10 NOTE — BH CONSULTATION LIAISON PROGRESS NOTE - NSBHCONSULTFOLLOWAFTERCARE_PSY_A_CORE FT
may f/u at [facility] [phone]
may f/u at [facility] [phone]
may f/u at [facility] [phone]
may f/u at [facility] [phone]
may f/u at [facility] [phone]
may f/u at [facility] [phone]
may f/u at [facility] [phone]
may f/u at [facility] [phone]
may f/u at [facility] [phone]
may f/u at [facility] [phone]

## 2022-02-10 NOTE — PROCEDURE NOTE - PLAN
Left AVF okay for immediate use.
Ok to use dialysis catheter immediately.
- tunneled HD catheter and gastrostomy tube ok to use immediately  - remainder of care per primary team   -
Official radiology report to follow.
Back to ICU  Frequent right groin checks to evaluate for hematoma

## 2022-02-10 NOTE — PROGRESS NOTE ADULT - SUBJECTIVE AND OBJECTIVE BOX
Chief complaint  Patient is a 71y old  Male who presents with a chief complaint of leg swelling (10 Feb 2022 14:00)   Review of systems  Patient in bed, looks comfortable, no hypoglycemic episodes.    Labs and Fingersticks  CAPILLARY BLOOD GLUCOSE      POCT Blood Glucose.: 120 mg/dL (10 Feb 2022 11:39)  POCT Blood Glucose.: 142 mg/dL (10 Feb 2022 06:32)  POCT Blood Glucose.: 144 mg/dL (10 Feb 2022 04:25)  POCT Blood Glucose.: 122 mg/dL (10 Feb 2022 00:08)  POCT Blood Glucose.: 116 mg/dL (09 Feb 2022 16:56)      Anion Gap, Serum: 14 (02-10 @ 06:55)  Anion Gap, Serum: 13 (02-10 @ 05:19)  Anion Gap, Serum: 17 (02-09 @ 12:39)      Calcium, Total Serum: 9.8 (02-10 @ 06:55)  Calcium, Total Serum: 7.9 *L* (02-10 @ 05:19)  Calcium, Total Serum: 9.2 (02-09 @ 12:39)          02-10    136  |  99  |  61<H>  ----------------------------<  134<H>  4.1   |  23  |  3.62<H>    Ca    9.8      10 Feb 2022 06:55                          7.8    8.50  )-----------( 316      ( 10 Feb 2022 05:21 )             26.4     Medications  MEDICATIONS  (STANDING):  atorvastatin 80 milliGRAM(s) Oral at bedtime  carbidopa/levodopa  25/100 2.5 Tablet(s) Oral <User Schedule>  carbidopa/levodopa  25/100 2 Tablet(s) Oral <User Schedule>  chlorhexidine 4% Liquid 1 Application(s) Topical <User Schedule>  cinacalcet 60 milliGRAM(s) Oral daily  dextrose 40% Gel 15 Gram(s) Oral once  dextrose 5%. 1000 milliLiter(s) (50 mL/Hr) IV Continuous <Continuous>  dextrose 5%. 1000 milliLiter(s) (100 mL/Hr) IV Continuous <Continuous>  dextrose 5%. 1000 milliLiter(s) (40 mL/Hr) IV Continuous <Continuous>  dextrose 50% Injectable 25 Gram(s) IV Push once  dextrose 50% Injectable 12.5 Gram(s) IV Push once  dextrose 50% Injectable 25 Gram(s) IV Push once  dextrose 50% Injectable 12.5 Gram(s) IV Push once  dextrose 50% Injectable 25 Gram(s) IV Push once  diVALproex Sprinkle 250 milliGRAM(s) Oral three times a day  DULoxetine 20 milliGRAM(s) Oral daily  folic acid 1 milliGRAM(s) Oral daily  glucagon  Injectable 1 milliGRAM(s) IntraMuscular once  insulin lispro (ADMELOG) corrective regimen sliding scale   SubCutaneous every 6 hours  levothyroxine Injectable 12.5 MICROGram(s) IV Push <User Schedule>  memantine 5 milliGRAM(s) Oral at bedtime  metoprolol tartrate Injectable 5 milliGRAM(s) IV Push every 6 hours  midodrine 10 milliGRAM(s) Oral <User Schedule>  mirtazapine 7.5 milliGRAM(s) Oral daily  Nephro-celeste 1 Tablet(s) Oral daily  pantoprazole  Injectable 40 milliGRAM(s) IV Push two times a day  QUEtiapine 25 milliGRAM(s) Oral at bedtime  tamsulosin 0.4 milliGRAM(s) Oral at bedtime  trihexyphenidyl 2 milliGRAM(s) Oral three times a day      Physical Exam  General: Patient comfortable in bed  Vital Signs Last 12 Hrs  T(F): 97.4 (02-10-22 @ 11:05), Max: 98.4 (02-10-22 @ 04:41)  HR: 122 (02-10-22 @ 11:05) (121 - 122)  BP: 160/97 (02-10-22 @ 11:05) (155/93 - 160/97)  BP(mean): --  RR: 20 (02-10-22 @ 11:05) (19 - 20)  SpO2: 93% (02-10-22 @ 11:05) (91% - 93%)  Neck: No palpable thyroid nodules.  CVS: S1S2, No murmurs  Respiratory: No wheezing, no crepitations  GI: Abdomen soft, bowel sounds positive  Musculoskeletal:  edema lower extremities.   Skin: No skin rashes, no ecchymosis    Diagnostics             Chief complaint  Patient is a 71y old  Male who presents with a chief complaint of leg swelling (10 Feb 2022 14:00)   Review of systems  Patient in bed, looks comfortable, no hypoglycemic episodes.    Labs and Fingersticks  CAPILLARY BLOOD GLUCOSE      POCT Blood Glucose.: 120 mg/dL (10 Feb 2022 11:39)  POCT Blood Glucose.: 142 mg/dL (10 Feb 2022 06:32)  POCT Blood Glucose.: 144 mg/dL (10 Feb 2022 04:25)  POCT Blood Glucose.: 122 mg/dL (10 Feb 2022 00:08)  POCT Blood Glucose.: 116 mg/dL (09 Feb 2022 16:56)      Anion Gap, Serum: 14 (02-10 @ 06:55)  Anion Gap, Serum: 13 (02-10 @ 05:19)  Anion Gap, Serum: 17 (02-09 @ 12:39)      Calcium, Total Serum: 9.8 (02-10 @ 06:55)  Calcium, Total Serum: 7.9 *L* (02-10 @ 05:19)  Calcium, Total Serum: 9.2 (02-09 @ 12:39)          02-10    136  |  99  |  61<H>  ----------------------------<  134<H>  4.1   |  23  |  3.62<H>    Ca    9.8      10 Feb 2022 06:55                          7.8    8.50  )-----------( 316      ( 10 Feb 2022 05:21 )             26.4     Medications  MEDICATIONS  (STANDING):  atorvastatin 80 milliGRAM(s) Oral at bedtime  carbidopa/levodopa  25/100 2.5 Tablet(s) Oral <User Schedule>  carbidopa/levodopa  25/100 2 Tablet(s) Oral <User Schedule>  chlorhexidine 4% Liquid 1 Application(s) Topical <User Schedule>  cinacalcet 60 milliGRAM(s) Oral daily  dextrose 40% Gel 15 Gram(s) Oral once  dextrose 5%. 1000 milliLiter(s) (50 mL/Hr) IV Continuous <Continuous>  dextrose 5%. 1000 milliLiter(s) (100 mL/Hr) IV Continuous <Continuous>  dextrose 5%. 1000 milliLiter(s) (40 mL/Hr) IV Continuous <Continuous>  dextrose 50% Injectable 25 Gram(s) IV Push once  dextrose 50% Injectable 12.5 Gram(s) IV Push once  dextrose 50% Injectable 25 Gram(s) IV Push once  dextrose 50% Injectable 12.5 Gram(s) IV Push once  dextrose 50% Injectable 25 Gram(s) IV Push once  diVALproex Sprinkle 250 milliGRAM(s) Oral three times a day  DULoxetine 20 milliGRAM(s) Oral daily  folic acid 1 milliGRAM(s) Oral daily  glucagon  Injectable 1 milliGRAM(s) IntraMuscular once  insulin lispro (ADMELOG) corrective regimen sliding scale   SubCutaneous every 6 hours  levothyroxine Injectable 12.5 MICROGram(s) IV Push <User Schedule>  memantine 5 milliGRAM(s) Oral at bedtime  metoprolol tartrate Injectable 5 milliGRAM(s) IV Push every 6 hours  midodrine 10 milliGRAM(s) Oral <User Schedule>  mirtazapine 7.5 milliGRAM(s) Oral daily  Nephro-celeste 1 Tablet(s) Oral daily  pantoprazole  Injectable 40 milliGRAM(s) IV Push two times a day  QUEtiapine 25 milliGRAM(s) Oral at bedtime  tamsulosin 0.4 milliGRAM(s) Oral at bedtime  trihexyphenidyl 2 milliGRAM(s) Oral three times a day      Physical Exam  General: Patient comfortable in bed  Vital Signs Last 12 Hrs  T(F): 97.4 (02-10-22 @ 11:05), Max: 98.4 (02-10-22 @ 04:41)  HR: 122 (02-10-22 @ 11:05) (121 - 122)  BP: 160/97 (02-10-22 @ 11:05) (155/93 - 160/97)  BP(mean): --  RR: 20 (02-10-22 @ 11:05) (19 - 20)  SpO2: 93% (02-10-22 @ 11:05) (91% - 93%)  Neck: No palpable thyroid nodules.  CVS: S1S2, No murmurs  Respiratory: No wheezing, no crepitations  GI: Abdomen soft, bowel sounds positive  Musculoskeletal:  edema lower extremities.   Skin: No skin rashes, no ecchymosis    Diagnostics

## 2022-02-10 NOTE — BH CONSULTATION LIAISON PROGRESS NOTE - NSBHMSESPEECH_PSY_A_CORE
Normal volume, rate, productivity, spontaneity and articulation
Normal volume, rate, productivity, spontaneity and articulation
Abnormal as indicated, otherwise normal...
Abnormal as indicated, otherwise normal...
Normal volume, rate, productivity, spontaneity and articulation

## 2022-02-10 NOTE — PROGRESS NOTE ADULT - SUBJECTIVE AND OBJECTIVE BOX
Wound Surgery Progress Note:    HPI:  70yo M w/ PMHx of CAD (s/p CABG 2019), CKD (unknown stage), DM2, Parkinson's Disease, HTN, depression presents with bilateral leg swelling, patient's daughter in-law at bedside Yoly Quintero ([address] [place] [address] Nurse) provides the history, the daughter reports the patient is a poor historian, unable to remember having conversations with others and likely cannot weigh risk/benefits of medical conditions, she reports that he has had bilateral lower extremity swelling for the past few months, but over the past 2 weeks it has significantly worsened, he has further difficulty ambulating due to swelling, his outpatient provider attempted to manage with 20mg lasix and then increased to 40mg lasix but the patient never took the increased dose, his symptoms ar persistent throughout the day and are extremely severe, he has developed wounds of the legs with weeping of fluid due to the swelling, she reports that the patient is frequently non-compliant with medications and medical management, he lives at home with his son and daughter in law, he denies chest pain, shortness of breath, fever/chills, cough, sputum, in the ED, he was tachycardic but hemodynamically stable, afebrile, saturating well on room air, labs were significant for elevated BNP, elevated creatinine, imaging showed moderate left pleural effusion, patient was admitted to general medicine for further management  (21 Oct 2021 07:06)    Request for wound care consult for sacral/bilateral buttocks skin breakdown received from nursing. Mr. Quintero was encountered on an alternating air with low air loss surface. He is incontinent of stool and urine. He was unable turn and reposition self in bed. His extreme immobility, inactivity, incontinence of urine and stool as well as poor nutritional status all contribute to his high risk for pressure injury development and hinder healing.    PAST MEDICAL & SURGICAL HISTORY:  Hypertension  Diabetes Mellitus, Type 2  Hypercholesterolemia  Parkinson disease  CAD (coronary artery disease), s/p 1 stent in 2010 and CABG 2019  S/P CABG (coronary artery bypass graft), 2019  S/P hip replacement, right, 2016  Anxiety  Depression, major    MEDICATIONS  (STANDING):  atorvastatin 80 milliGRAM(s) Oral at bedtime  carbidopa/levodopa  25/100 2.5 Tablet(s) Oral <User Schedule>  carbidopa/levodopa  25/100 2 Tablet(s) Oral <User Schedule>  chlorhexidine 4% Liquid 1 Application(s) Topical <User Schedule>  cinacalcet 60 milliGRAM(s) Oral daily  dextrose 40% Gel 15 Gram(s) Oral once  dextrose 5%. 1000 milliLiter(s) (50 mL/Hr) IV Continuous <Continuous>  dextrose 5%. 1000 milliLiter(s) (100 mL/Hr) IV Continuous <Continuous>  dextrose 5%. 1000 milliLiter(s) (40 mL/Hr) IV Continuous <Continuous>  dextrose 50% Injectable 25 Gram(s) IV Push once  dextrose 50% Injectable 12.5 Gram(s) IV Push once  dextrose 50% Injectable 25 Gram(s) IV Push once  dextrose 50% Injectable 12.5 Gram(s) IV Push once  dextrose 50% Injectable 25 Gram(s) IV Push once  diVALproex Sprinkle 250 milliGRAM(s) Oral three times a day  DULoxetine 20 milliGRAM(s) Oral daily  folic acid 1 milliGRAM(s) Oral daily  glucagon  Injectable 1 milliGRAM(s) IntraMuscular once  insulin lispro (ADMELOG) corrective regimen sliding scale   SubCutaneous every 6 hours  levothyroxine Injectable 12.5 MICROGram(s) IV Push <User Schedule>  memantine 5 milliGRAM(s) Oral at bedtime  metoprolol tartrate Injectable 5 milliGRAM(s) IV Push every 6 hours  midodrine 10 milliGRAM(s) Oral <User Schedule>  mirtazapine 7.5 milliGRAM(s) Oral daily  Nephro-celeste 1 Tablet(s) Oral daily  pantoprazole  Injectable 40 milliGRAM(s) IV Push two times a day  QUEtiapine 25 milliGRAM(s) Oral at bedtime  tamsulosin 0.4 milliGRAM(s) Oral at bedtime  trihexyphenidyl 2 milliGRAM(s) Oral three times a day    MEDICATIONS  (PRN):  acetaminophen     Tablet .. 650 milliGRAM(s) Oral every 6 hours PRN Mild Pain (1 - 3)  albuterol/ipratropium for Nebulization 3 milliLiter(s) Nebulizer every 6 hours PRN Shortness of Breath and/or Wheezing  diVALproex Sprinkle 125 milliGRAM(s) Oral every 8 hours PRN Agitation  guaiFENesin Oral Liquid (Sugar-Free) 200 milliGRAM(s) Oral every 6 hours PRN Cough  ondansetron Injectable 4 milliGRAM(s) IV Push once PRN Nausea and/or Vomiting  QUEtiapine 12.5 milliGRAM(s) Oral every 6 hours PRN agitation  sodium chloride 0.9% lock flush 10 milliLiter(s) IV Push every 1 hour PRN Pre/post blood products, medications, blood draw, and to maintain line patency    Allergies    adhesives (Rash)  latex (Urticaria)  No Known Drug Allergies    Intolerances    Vital Signs Last 24 Hrs  T(C): 36.3 (10 Feb 2022 11:05), Max: 36.9 (10 Feb 2022 04:41)  T(F): 97.4 (10 Feb 2022 11:05), Max: 98.4 (10 Feb 2022 04:41)  HR: 122 (10 Feb 2022 11:05) (111 - 122)  BP: 160/97 (10 Feb 2022 11:05) (149/94 - 160/97)  BP(mean): --  RR: 20 (10 Feb 2022 11:05) (19 - 20)  SpO2: 93% (10 Feb 2022 11:05) (91% - 94%)    Physical Exam:  General: Confused  Respiratory: no SOB on supplemental O2  Gastrointestinal: soft NT/ND  Neurology: verbal, not following commands  Musculoskeletal: no contractures  Vascular: BLE edema equal  Skin:  Sacrum and bilateral buttocks with intact skin without maceration or erythema  Right heel with small area of maroon discolored intact skin (0.5cm x 0.5cm x0)  Bilateral legs with dry, flaking skin with no open wounds, no drainage  No odor, erythema, increased warmth, tenderness, induration, fluctuance    LABS:  02-10    136  |  99  |  61<H>  ----------------------------<  134<H>  4.1   |  23  |  3.62<H>    Ca    9.8      10 Feb 2022 06:55                            7.8    8.50  )-----------( 316      ( 10 Feb 2022 05:21 )             26.4     PT/INR - ( 10 Feb 2022 05:21 )   PT: 14.1 sec;   INR: 1.18 ratio

## 2022-02-10 NOTE — PROCEDURE NOTE - NSPROCNAME_GEN_A_CORE
Interventional Radiology
Central Line Insertion
Central Line Insertion

## 2022-02-10 NOTE — BH CONSULTATION LIAISON PROGRESS NOTE - NSBHMSEPERCEPT_PSY_A_CORE
Visual hallucinations
Unable to assess
Visual hallucinations

## 2022-02-10 NOTE — PROGRESS NOTE ADULT - ASSESSMENT
71 M w volume overload, GI consulted for drop in hgb    1. CHF per cardio    2. ABBY on CKD per renal  -on HD via permacath per renal, midodrine during dialysis  -AVF not functional   -s/p non-tunneled HD catheter insertion 2/4.  -planned for tunneled catheter today today     3. RP bleed  -s/p Abdominal Angiography and Embolization on 10/29/2021 by Interventional radiology of l4and l5 lumbar artery     4. Failure to thrive  -s/p PEG 1/10    -patient pulled out PEG yesterday; 16F johnson placed to keep tract open by medicine PA   -IR consulted for PEG replacement with IR; to be replaced today  -maintain mittens     5. stress duodenal ulcers  -PPI BID    6. Constipation  -resolved  -nonobstructive gas pattern on x-ray 1/13  -continue Miralax BID  -DC planning to rehab underway     [facility]  Gastroenterology and Hepatology  [address] [place], [place]  Office: [phone]  Cell: [phone]

## 2022-02-10 NOTE — PROCEDURE NOTE - PATIENT CONDITION/DISPOSITION
Recovery, then floor if stable
Back to ICU
Recovery, then floor if stable

## 2022-02-10 NOTE — BH CONSULTATION LIAISON PROGRESS NOTE - NSBHCHARTREVIEWLAB_PSY_A_CORE FT
10.1   14.93 )-----------( 210      ( 29 Nov 2021 07:24 )             33.5   11-29    133<L>  |  93<L>  |  64<H>  ----------------------------<  106<H>  4.9   |  22  |  6.30<H>    Ca    9.9      29 Nov 2021 07:20    
                      9.1    13.37 )-----------( 269      ( 26 Nov 2021 05:37 )             30.5     11-26    133<L>  |  94<L>  |  51<H>  ----------------------------<  205<H>  3.9   |  23  |  5.77<H>    Ca    9.7      26 Nov 2021 05:37    TPro  8.5<H>  /  Alb  3.0<L>  /  TBili  0.7  /  DBili  x   /  AST  30  /  ALT  <5<L>  /  AlkPhos  108  11-26    
                      10.1   14.93 )-----------( 210      ( 29 Nov 2021 07:24 )             33.5   11-29    133<L>  |  93<L>  |  64<H>  ----------------------------<  106<H>  4.9   |  22  |  6.30<H>    Ca    9.9      29 Nov 2021 07:20    
                      10.1   14.93 )-----------( 210      ( 29 Nov 2021 07:24 )             33.5   11-29    133<L>  |  93<L>  |  64<H>  ----------------------------<  106<H>  4.9   |  22  |  6.30<H>    Ca    9.9      29 Nov 2021 07:20    
                      9.3    9.15  )-----------( 282      ( 24 Nov 2021 06:13 )             31.0     11-24    130<L>  |  94<L>  |  58<H>  ----------------------------<  180<H>  4.2   |  22  |  5.77<H>    Ca    9.6      24 Nov 2021 06:13  Phos  4.5     11-24

## 2022-02-10 NOTE — PRE PROCEDURE NOTE - PRE PROCEDURE EVALUATION
------------------------------------------------------------  Interventional Radiology Pre-Procedure Note  ------------------------------------------------------------    Indication: 71y Male w/ active retroperitoneal bleeding requiring persistent transfusions.     Past Medical History:     Hypertension    Diabetes Mellitus, Type 2    Hypercholesterolemia    Parkinson disease    CAD (coronary artery disease)    Leg swelling    Autoimmune disease    Insomnia    Vertigo    Gastroesophageal reflux disease without esophagitis    Nocturia    Urinary incontinence    Urinary frequency    Panic attacks    Osteoarthritis    Shoulder pain, left        Allergies: adhesives (Rash)  latex (Urticaria)  No Known Drug Allergies      Vital Signs: T(F): 93.2 (09:03), Max: 98.1 (09:57)  HR: 115 (09:03)  BP: 125/69 (09:03)  RR: 16 (09:03)  SpO2: 99% (09:03)    Imaging: Reviewed. Increasing retroperitoneal hematoma with concern for active extravasation.     Consent: Obtained by phone and placed in Chart    Plan:   Abdominal Angiography with possible embolization.     
Interventional Radiology    HPI: 71 h/o CAD (s/p CABG 2019), CKD, DM2, Parkinson's Disease, HTN, depression  a/w LE edema, found to have NSTEMI, cardiomyopathy, ABBY on CKD, Aflutter, now requiring venous access for new initiation of HD referred to IR for non-tunneled HD catheter placement.         Allergies: adhesives (Rash)  latex (Urticaria)    Medications (Abx/Cardiac/Anticoagulation/Blood Products)  aspirin enteric coated: 81 milliGRAM(s) Oral (10-26 @ 12:36)  enoxaparin Injectable: 100 milliGRAM(s) SubCutaneous (10-25 @ 12:13)  furosemide   Injectable: 60 milliGRAM(s) IV Push (10-26 @ 05:37)  furosemide   Injectable: 40 milliGRAM(s) IV Push (10-26 @ 14:38)  furosemide   Injectable: 40 milliGRAM(s) IV Push (10-25 @ 15:16)  furosemide   Injectable: 40 milliGRAM(s) IV Push (10-26 @ 03:51)  metoprolol tartrate: 25 milliGRAM(s) Oral (10-26 @ 05:32)    Data:  T(C): 36.8  HR: 76  BP: 156/76  RR: 18  SpO2: 98%    Acute on chronic systolic congestive heart failure  No significant family history  Family history of hypertension  Family history of malaria  Family history of pulmonary fibrosis (Sibling)  Hypertension  Diabetes Mellitus, Type 2  Hypercholesterolemia  Parkinson disease  CAD (coronary artery disease)  Leg swelling  Autoimmune disease  Insomnia  Vertigo  Gastroesophageal reflux disease without esophagitis  Nocturia  Urinary incontinence  Urinary frequency  Panic attacks  Osteoarthritis  Shoulder pain, left  Acute on chronic systolic congestive heart failure  Acute heart failure  Atrial flutter  DM2 (diabetes mellitus, type 2)  CAD (coronary artery disease)  Parkinsons disease  Acute on chronic renal failure  NSTEMI (non-ST elevation myocardial infarction)  Hypertension  Acute kidney injury superimposed on CKD  Anemia  Hypothyroidism  Status Post Cardiac Catheterization  S/P angioplasty with stent  S/P CABG (coronary artery bypass graft)  S/P hip replacement, right  Anxiety  Depression, major  LE SWELLING  90+  Acute renal failure          Exam  General: No acute distress  Chest: Non labored breathing    -WBC 12.99 / HgB 6.8 / Hct 20.8 / Plt 221  -Na 134 / Cl 99 /  / Glucose 124  -K 4.9 / CO2 15 / Cr 7.43  -ALT -- / Alk Phos -- / T.Bili --    -INR1.11    LABS:  Na: 134 (10-26 @ 07:45), 131 (10-25 @ 07:37), 132 (10-24 @ 07:19)  K: 4.9 (10-26 @ 07:45), 5.1 (10-25 @ 07:37), 4.6 (10-24 @ 07:19)  Cl: 99 (10-26 @ 07:45), 97 (10-25 @ 07:37), 98 (10-24 @ 07:19)  CO2: 15 (10-26 @ 07:45), 14 (10-25 @ 07:37), 14 (10-24 @ 07:19)  BUN: 140 (10-26 @ 07:45), 132 (10-25 @ 07:37), 123 (10-24 @ 07:19)  Cr: 7.43 (10-26 @ 07:45), 7.23 (10-25 @ 07:37), 6.75 (10-24 @ 07:19)  Glu: 124(10-26 @ 07:45), 132(10-25 @ 07:37), 110(10-24 @ 07:19)      WBC: 12.99 (10-26 @ 07:17), 13.21 (10-25 @ 22:54), 11.79 (10-25 @ 09:06), 11.66 (10-25 @ 07:24), 12.41 (10-24 @ 07:21)    Plt: 221 (10-26 @ 07:17), 211 (10-25 @ 22:54), 217 (10-25 @ 09:06), 221 (10-25 @ 07:24), 236 (10-24 @ 07:21)  INR: 1.11 10-26-21 @ 11:06  PTT: 79.0 10-26-21 @ 11:06, 80.8 10-25-21 @ 07:24, >200.0 10-24-21 @ 12:30, >200.0 10-24-21 @ 03:08, >200.0 10-23-21 @ 18:33  Hgb: 6.8 (10-26 @ 07:17), 7.0 (10-25 @ 22:54), 6.1 (10-25 @ 09:06), 6.3 (10-25 @ 07:24), 7.3 (10-24 @ 07:21)  Hct: 20.8 (10-26 @ 07:17), 21.2 (10-25 @ 22:54), 19.0 (10-25 @ 09:06), 19.6 (10-25 @ 07:24), 22.5 (10-24 @ 07:21)        Plan: 71y Male presents for non tunneled cvc for HD.     PTT: 79.0 10-26-21 @ 11:06, 80.8 10-25-21 @ 07:24, >200.0 10-24-21 @ 12:30, >200.0 10-24-21 @ 03:08, >200.0 10-23-21 @ 18:33  Hgb: 6.8 (10-26 @ 07:17), 7.0 (10-25 @ 22:54), 6.1 (10-25 @ 09:06), 6.3 (10-25 @ 07:24), 7.3 (10-24 @ 07:21)  Hct: 20.8 (10-26 @ 07:17), 21.2 (10-25 @ 22:54), 19.0 (10-25 @ 09:06), 19.6 (10-25 @ 07:24), 22.5 (10-24 @ 07:21)  BUN: 140 (10-26 @ 07:45), 132 (10-25 @ 07:37), 123 (10-24 @ 07:19)  Cr: 7.43 (10-26 @ 07:45), 7.23 (10-25 @ 07:37), 6.75 (10-24 @ 07:19)    Hep gtt held for procedure  transfused 1U prbc  pre procedure  -Risks/Benefits/alternatives explained with the patient and/or healthcare proxy and witnessed informed consent obtained. 
Interventional Radiology    HPI: 71y Male with DM2, Parkinson's Disease presents with new onset acute heart failure exacerbation, NSTEMI, started on hep gtt, developed bl RP bleed, acute anemia. sp MICU course for hemorrhagic shock.10/28 sp IR embolization l4 and l5 lumbar artery. S/p nontunneled HD cath placed 2/4. PEG tube placed on 1/10, pulled out on 2/6, 16Fr johnson currently in place to keep tract open. Patient presents to IR for Conversion of non-tunnelled to tunnelled HD catheter and G-tube replacement    Allergies: adhesives (Rash)  latex (Urticaria)    Medications (Abx/Cardiac/Anticoagulation/Blood Products)    metoprolol tartrate Injectable: 5 milliGRAM(s) IV Push (02-10 @ 12:16)    Data:    T(C): 36.3  HR: 122  BP: 160/97  RR: 20  SpO2: 93%    -WBC 8.23 / HgB 8.7 / Hct 29.0 / Plt 340  -Na 136 / Cl 99 / BUN 61 / Glucose 134  -K 4.1 / CO2 23 / Cr 3.62  -ALT -- / Alk Phos -- / T.Bili --  -INR1.18    Plan: 71y Male presents for conversion of non-tunnelled to tunnelled HD catheter and G-tube replacement.   -Risks/Benefits/alternatives explained with the patient's healthcare proxy/ son Yunier Quintero @ [phone] and witnessed informed consent obtained.   -DNR status was addressed and per son's request DNR was modified with limitation of no compressions however, intubation ok. He verbalizes understanding that DNR will be reinstated upon returning to the floor.   
Interventional Radiology Pre-Procedure Note    Procedure: Tunneled hemodialysis central venous catheter placement    Diagnosis/Indication: Patient is a 71y old male who presents to IR for placement of a tunneled hemodialysis central venous catheter.    PAST MEDICAL & SURGICAL HISTORY:  Hypertension    Diabetes Mellitus, Type 2    Hypercholesterolemia    Parkinson disease    CAD (coronary artery disease)  s/p 1 stent in 2010 and CABG 2019    S/P CABG (coronary artery bypass graft)  2019    S/P hip replacement, right  2016    Anxiety    Depression, major         Male    Allergies: adhesives (Rash)  latex (Urticaria)  No Known Drug Allergies      LABS:  CBC Full  -  ( 14 Dec 2021 05:41 )  WBC Count : 8.65 K/uL  RBC Count : 2.97 M/uL  Hemoglobin : 9.1 g/dL  Hematocrit : 30.3 %  Platelet Count - Automated : 263 K/uL  Mean Cell Volume : 102.0 fl  Mean Cell Hemoglobin : 30.6 pg  Mean Cell Hemoglobin Concentration : 30.0 gm/dL  Auto Neutrophil # : x  Auto Lymphocyte # : x  Auto Monocyte # : x  Auto Eosinophil # : x  Auto Basophil # : x  Auto Neutrophil % : x  Auto Lymphocyte % : x  Auto Monocyte % : x  Auto Eosinophil % : x  Auto Basophil % : x    12-14    134<L>  |  95<L>  |  71<H>  ----------------------------<  95  4.4   |  22  |  6.50<H>    Ca    10.1      14 Dec 2021 05:41  Phos  6.3     12-14  Mg     2.2     12-14      PT/INR - ( 14 Dec 2021 05:43 )   PT: 12.8 sec;   INR: 1.07 ratio         PTT - ( 14 Dec 2021 05:43 )  PTT:49.7 sec    Procedure/ risks/ benefits were explained to the patient's family member. Informed consent was obtained from the family member, who verbalized understanding. 
Interventional Radiology    HPI: 71y Male with ESRD,  AVF now s/p Fistulogram and angioplasty on 2/2/2022 in Interventional Radiology with Dr. Canales. Presents today for nontunneled HD catheter placement for HD while fistula matures.        Allergies: adhesives (Rash)  latex (Urticaria)    Medications (Abx/Cardiac/Anticoagulation/Blood Products)    metoprolol tartrate: 50 milliGRAM(s) Oral (02-04 @ 12:14)  midodrine: 10 milliGRAM(s) Oral (02-03 @ 11:50)  tamsulosin: 0.4 milliGRAM(s) Oral (02-03 @ 22:54)    Data:    T(C): 36.3  HR: 92  BP: 150/81  RR: 18  SpO2: 93%    Acute on chronic systolic congestive heart failure    Yes    No significant family history    Family history of hypertension    Family history of malaria    Family history of pulmonary fibrosis (Sibling)    Handoff    At Risk    MEWS Score    Hypertension    Diabetes Mellitus, Type 2    Hypercholesterolemia    Parkinson disease    CAD (coronary artery disease)    Leg swelling    Autoimmune disease    Insomnia    Vertigo    Gastroesophageal reflux disease without esophagitis    Nocturia    Urinary incontinence    Urinary frequency    Panic attacks    Osteoarthritis    Shoulder pain, left    Delirium    Delirium secondary to multiple medical problems    Acute on chronic systolic congestive heart failure    Acute heart failure    Atrial flutter    DM2 (diabetes mellitus, type 2)    CAD (coronary artery disease)    Parkinsons disease    Acute on chronic renal failure    NSTEMI (non-ST elevation myocardial infarction)    Hypertension    Acute kidney injury superimposed on CKD    Anemia    Hypothyroidism    Delirium    Advanced care planning/counseling discussion    Palliative care status    Palliative care encounter    Pain    Stage 5 chronic kidney disease not on chronic dialysis    Hypercalcemia    Preop cardiovascular exam    Creation, AV fistula, upper extremity, using transposition technique    Status Post Cardiac Catheterization    S/P angioplasty with stent    S/P CABG (coronary artery bypass graft)    S/P hip replacement, right    Anxiety    Depression, major    LE SWELLING    90+    Acute renal failure    Stage 5 chronic kidney disease not on chronic dialysis    DM2 (diabetes mellitus, type 2)    CAD (coronary artery disease)        Exam  General: No acute distress  Chest: Non labored breathing    -WBC 8.39 / HgB 8.9 / Hct 28.6 / Plt 319  -Na 130 / Cl 93 /  / Glucose 110  -K 4.2 / CO2 22 / Cr 4.74  -ALT -- / Alk Phos -- / T.Bili --  -INR1.05    Plan: 71y Male presents for nontunneled HD catheter placement.  -Risks/Benefits/alternatives explained with the patient and/or healthcare proxy and witnessed informed consent obtained.

## 2022-02-10 NOTE — BH CONSULTATION LIAISON PROGRESS NOTE - NSBHPTASSESSDT_PSY_A_CORE
17-Nov-2021 17:39
24-Nov-2021 14:58
18-Jan-2022 17:28
31-Jan-2022 16:37
15-Dec-2021 16:26
26-Jan-2022 17:33
10-Feb-2022 23:00
22-Nov-2021 18:15
26-Nov-2021 14:35
29-Nov-2021 12:54

## 2022-02-10 NOTE — BH CONSULTATION LIAISON PROGRESS NOTE - NSBHCONSULTRECOMMENDOTHER_PSY_A_CORE FT
Continue holding antipsychotics in light of QTc prolongation, consider cardiology input for manual QTc calc    C/w Depakote 250mg PO BID, monitor PLT/LFT    PRN: Depacon 125mg IV q8h PRN agitation, Ativan 0.25mg PO q8h PRN SEVERE agitation only (caution judicious use as may worsen delirium)    CL psych will f/u
Hold antipsychotics in light of significant QTc prolongation, consider cardiology input for manual QTc calc    Start Depakote 250mg PO BID, monitor PLT/LFT    PRN: Depacon 125mg IV q8h PRN agitation, Ativan 0.25mg PO q8h PRN SEVERE agitation only (caution judicious use as may worsen delirium)    CL psych will f/u
Continue holding antipsychotics in light of QTc prolongation, consider cardiology input for manual QTc calc    C/w Depakote 250mg PO BID, monitor PLT/LFT    PRN: Depacon 125mg IV q8h PRN agitation, Ativan 0.25mg PO q8h PRN SEVERE agitation only (caution judicious use as may worsen delirium)    CL psych will f/u

## 2022-02-10 NOTE — BH CONSULTATION LIAISON PROGRESS NOTE - NSICDXBHPRIMARYDX_PSY_ALL_CORE
Delirium secondary to multiple medical problems   F05  
Delirium   R41.0  
Delirium   R41.0  
Delirium secondary to multiple medical problems   F05  

## 2022-02-10 NOTE — PROCEDURE NOTE - GENERAL PROCEDURE NAME
Conversion from nontunneled to tunneled HD catheter. Gastrostomy tube replacement.
Abdominal Angiography and Embolization
Fistulogram and angioplasty
conversion of nontunneled to tunneled dialysis catheter
Tunneled hemodialysis catheter reinstertion

## 2022-02-10 NOTE — PROGRESS NOTE ADULT - ATTENDING COMMENTS
pt remains on mittens  psych reconsult pending  palliative reconsult  ? need to justify rpt PEG and permcath placement  son wish all aggressive measures    [facility]

## 2022-02-10 NOTE — BH CONSULTATION LIAISON PROGRESS NOTE - NSBHMSEBEHAV_PSY_A_CORE
Cooperative
Uncooperative
Uncooperative
Cooperative
Uncooperative

## 2022-02-10 NOTE — BH CONSULTATION LIAISON PROGRESS NOTE - NSBHMSEGAIT_PSY_A_CORE
Unable to assess

## 2022-02-10 NOTE — BH CONSULTATION LIAISON PROGRESS NOTE - NSBHFUPINTERVALCCFT_PSY_A_CORE
pt is currently drowsy
"This is graveyard."
"I didn't pull out anything." 
"I'm just cold." 
Pt says he is doing ok. 
Pt complains of having tight mitten restraints 
Pt says he is doing ok. 
no complaints
"This is my house."
pt is currently drowsy

## 2022-02-10 NOTE — PROGRESS NOTE ADULT - ASSESSMENT
Assessment  DMT2: 71y Male with DM T2 with hyperglycemia, A1C 6.4%, was on insulin at home, now on insulin coverage only, blood sugars trending within acceptable range (AM blood sugar 798 not real), remains on D5% pending peg replacement.  Hypothyroidism: Patient has no history thyroid disease, was not on any thyroid supplements, subclinical with low-normal FT4, lethargic, on synthroid 25 mcg po daily, repeat TFTs improved and euthyroid.  Hypercalcemia: Started on Sensipar 60mg daily, Ca improving.  CHF: on medications, stable, monitored.  HTN: Controlled,  on antihypertensive medications.  Parkinsons: on meds, monitored.  CKD: Monitor labs/BMP       Assessment  DMT2: 71y Male with DM T2 with hyperglycemia, A1C 6.4%, was on insulin at home, now on insulin coverage only, blood sugars trending within acceptable range (AM blood sugar 798 not real), remains on  D5% pending peg replacement.  Hypothyroidism: Patient has no history thyroid disease, was not on any thyroid supplements, subclinical with low-normal FT4, lethargic, on synthroid 25 mcg po daily, repeat TFTs improved and euthyroid.  Hypercalcemia: Started on Sensipar 60mg daily, Ca improving.  CHF: on medications, stable, monitored.  HTN: Controlled,  on antihypertensive medications.  Parkinsons: on meds, monitored.  CKD: Monitor labs/BMP

## 2022-02-10 NOTE — PROGRESS NOTE ADULT - SUBJECTIVE AND OBJECTIVE BOX
Chief Complaint:  Patient is a 71y old  Male who presents with a chief complaint of leg swelling (09 Feb 2022 15:09)      Date of service 02-10-22 @ 10:41      Interval Events:   Patient seen and examined.  For tunneled catheter and PEG replacement with IR today.  Last BM 2/8    Hospital Medications:  acetaminophen     Tablet .. 650 milliGRAM(s) Oral every 6 hours PRN  albuterol/ipratropium for Nebulization 3 milliLiter(s) Nebulizer every 6 hours PRN  atorvastatin 80 milliGRAM(s) Oral at bedtime  carbidopa/levodopa  25/100 2.5 Tablet(s) Oral <User Schedule>  carbidopa/levodopa  25/100 2 Tablet(s) Oral <User Schedule>  chlorhexidine 4% Liquid 1 Application(s) Topical <User Schedule>  cinacalcet 60 milliGRAM(s) Oral daily  dextrose 40% Gel 15 Gram(s) Oral once  dextrose 5%. 1000 milliLiter(s) IV Continuous <Continuous>  dextrose 5%. 1000 milliLiter(s) IV Continuous <Continuous>  dextrose 5%. 1000 milliLiter(s) IV Continuous <Continuous>  dextrose 50% Injectable 12.5 Gram(s) IV Push once  dextrose 50% Injectable 25 Gram(s) IV Push once  dextrose 50% Injectable 25 Gram(s) IV Push once  dextrose 50% Injectable 12.5 Gram(s) IV Push once  dextrose 50% Injectable 25 Gram(s) IV Push once  diVALproex Sprinkle 125 milliGRAM(s) Oral every 8 hours PRN  diVALproex Sprinkle 250 milliGRAM(s) Oral three times a day  DULoxetine 20 milliGRAM(s) Oral daily  folic acid 1 milliGRAM(s) Oral daily  glucagon  Injectable 1 milliGRAM(s) IntraMuscular once  guaiFENesin Oral Liquid (Sugar-Free) 200 milliGRAM(s) Oral every 6 hours PRN  insulin lispro (ADMELOG) corrective regimen sliding scale   SubCutaneous every 6 hours  levothyroxine Injectable 12.5 MICROGram(s) IV Push <User Schedule>  memantine 5 milliGRAM(s) Oral at bedtime  metoprolol tartrate Injectable 5 milliGRAM(s) IV Push every 6 hours  midodrine 10 milliGRAM(s) Oral <User Schedule>  mirtazapine 7.5 milliGRAM(s) Oral daily  Nephro-celeste 1 Tablet(s) Oral daily  ondansetron Injectable 4 milliGRAM(s) IV Push once PRN  pantoprazole  Injectable 40 milliGRAM(s) IV Push two times a day  QUEtiapine 25 milliGRAM(s) Oral at bedtime  QUEtiapine 12.5 milliGRAM(s) Oral every 6 hours PRN  sodium chloride 0.9% lock flush 10 milliLiter(s) IV Push every 1 hour PRN  tamsulosin 0.4 milliGRAM(s) Oral at bedtime  trihexyphenidyl 2 milliGRAM(s) Oral three times a day        Review of Systems:  unable to obtain     PHYSICAL EXAM:   Vital Signs:  Vital Signs Last 24 Hrs  T(C): 36.9 (10 Feb 2022 04:41), Max: 36.9 (10 Feb 2022 04:41)  T(F): 98.4 (10 Feb 2022 04:41), Max: 98.4 (10 Feb 2022 04:41)  HR: 121 (10 Feb 2022 04:41) (111 - 121)  BP: 155/93 (10 Feb 2022 04:41) (149/94 - 160/87)  BP(mean): --  RR: 19 (10 Feb 2022 04:41) (19 - 21)  SpO2: 91% (10 Feb 2022 04:41) (91% - 97%)  Daily     Daily       PHYSICAL EXAM:     GENERAL:  Appears stated age, well-groomed, well-nourished, no distress  HEENT:  NC/AT,  conjunctivae anicteric, clear and pink,   NECK: supple, trachea midline  CHEST:  Full & symmetric excursion, no increased effort, breath sounds clear  HEART:  Regular rhythm, no JVD  ABDOMEN:  Soft, non-tender, non-distended, normoactive bowel sounds,  no masses , no hepatosplenomegaly  EXTREMITIES:  no cyanosis,clubbing or edema  SKIN:  No rash, erythema, or, ecchymoses, no jaundice  NEURO:  non-focal, no asterixis  RECTAL: Deferred      LABS Personally reviewed by me:                        7.8    8.50  )-----------( 316      ( 10 Feb 2022 05:21 )             26.4     Mean Cell Volume: 114.3 fl (02-10-22 @ 05:21)    02-10    136  |  99  |  61<H>  ----------------------------<  134<H>  4.1   |  23  |  3.62<H>    Ca    9.8      10 Feb 2022 06:55        PT/INR - ( 10 Feb 2022 05:21 )   PT: 14.1 sec;   INR: 1.18 ratio                                     7.8    8.50  )-----------( 316      ( 10 Feb 2022 05:21 )             26.4                         9.4    9.14  )-----------( 352      ( 09 Feb 2022 12:38 )             31.5                         9.7    10.01 )-----------( 377      ( 08 Feb 2022 05:40 )             31.4       Imaging personally reviewed by me:

## 2022-02-10 NOTE — PROGRESS NOTE ADULT - SUBJECTIVE AND OBJECTIVE BOX
NEPHROLOGY    Patient seen and examined. Pt awake, reports feeling fine, no complaints of pain, no sob, comfortable on room air, in no acute distress.     MEDICATIONS  (STANDING):  atorvastatin 80 milliGRAM(s) Oral at bedtime  carbidopa/levodopa  25/100 2.5 Tablet(s) Oral <User Schedule>  carbidopa/levodopa  25/100 2 Tablet(s) Oral <User Schedule>  chlorhexidine 4% Liquid 1 Application(s) Topical <User Schedule>  cinacalcet 60 milliGRAM(s) Oral daily  dextrose 40% Gel 15 Gram(s) Oral once  dextrose 5%. 1000 milliLiter(s) (50 mL/Hr) IV Continuous <Continuous>  dextrose 5%. 1000 milliLiter(s) (100 mL/Hr) IV Continuous <Continuous>  dextrose 5%. 1000 milliLiter(s) (40 mL/Hr) IV Continuous <Continuous>  dextrose 50% Injectable 25 Gram(s) IV Push once  dextrose 50% Injectable 12.5 Gram(s) IV Push once  dextrose 50% Injectable 25 Gram(s) IV Push once  dextrose 50% Injectable 12.5 Gram(s) IV Push once  dextrose 50% Injectable 25 Gram(s) IV Push once  diVALproex Sprinkle 250 milliGRAM(s) Oral three times a day  DULoxetine 20 milliGRAM(s) Oral daily  folic acid 1 milliGRAM(s) Oral daily  glucagon  Injectable 1 milliGRAM(s) IntraMuscular once  insulin lispro (ADMELOG) corrective regimen sliding scale   SubCutaneous every 6 hours  levothyroxine Injectable 12.5 MICROGram(s) IV Push <User Schedule>  memantine 5 milliGRAM(s) Oral at bedtime  metoprolol tartrate Injectable 5 milliGRAM(s) IV Push every 6 hours  midodrine 10 milliGRAM(s) Oral <User Schedule>  mirtazapine 7.5 milliGRAM(s) Oral daily  Nephro-celeste 1 Tablet(s) Oral daily  pantoprazole  Injectable 40 milliGRAM(s) IV Push two times a day  QUEtiapine 25 milliGRAM(s) Oral at bedtime  tamsulosin 0.4 milliGRAM(s) Oral at bedtime  trihexyphenidyl 2 milliGRAM(s) Oral three times a day    VITALS:  T(C): , Max: 36.9 (02-10-22 @ 04:41)  T(F): , Max: 98.4 (02-10-22 @ 04:41)  HR: 122 (02-10-22 @ 11:05)  BP: 160/97 (02-10-22 @ 11:05)  RR: 20 (02-10-22 @ 11:05)  SpO2: 93% (02-10-22 @ 11:05)    I and O's:    02-09 @ 07:01  -  02-10 @ 07:00  --------------------------------------------------------  IN: 280 mL / OUT: 250 mL / NET: 30 mL    PHYSICAL EXAM:  Constitutional: NAD  Neck:  No JVD  Respiratory: poor effort    Cardiovascular: S1 and S2  Gastrointestinal: BS+, soft, NT/ND  Extremities: No peripheral edema  Neurological:    : No Javier  Skin: No rashes  Access:     LABS:                        7.8    8.50  )-----------( 316      ( 10 Feb 2022 05:21 )             26.4     02-10    136  |  99  |  61<H>  ----------------------------<  134<H>  4.1   |  23  |  3.62<H>    Ca    9.8      10 Feb 2022 06:55

## 2022-02-10 NOTE — BH CONSULTATION LIAISON PROGRESS NOTE - NSBHASSESSMENTFT_PSY_ALL_CORE
Pt is a 72yo M w/ PMHx of CAD (s/p CABG 2019), CKD (unknown stage), DM2, Parkinson's Disease, HTN, depression presents with new onset acute heart failure exacerbation, NSTEMI, started on hep gtt, developed bl RP bleed, acute anemia. Pt is s/p MICU course for hemorrhagic shock, 10/28 sp IR embolization l4 and l5 lumbar artery for permacath and AVF.  Pt's son reports that patient was alert and oriented before he developed his medical complications.  He has been confabulating since the last three weeks.  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  Pt is calm now but his son reports that he had a history of anxiety in the past but never confusion or confabulation as he does now.  Discussed risks/benefits of continuing the Seroquel and the recommendation for increasing it to 25mg PO BID.   Pt appears more sedated today on the low dose of Seroquel 12.5mg BID so would recommend keeping the low dose with prns of Seroquel 12.5mg if needed.   
Pt is a 70yo M w/ PMHx of CAD (s/p CABG 2019), CKD (unknown stage), DM2, Parkinson's Disease, HTN, depression presents with new onset acute heart failure exacerbation, NSTEMI, started on hep gtt, developed bl RP bleed, acute anemia. Pt is s/p MICU course for hemorrhagic shock, 10/28 sp IR embolization l4 and l5 lumbar artery for permacath and AVF.  Pt's son reports that patient was alert and oriented before he developed his medical complications.  He has been confabulating since the last three weeks.  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  Pt is calm now but his son reports that he had a history of anxiety in the past but never confusion or confabulation as he does now.    Pt has had multiple ECGs and all have been without any QTC prolongation except one.    Recommend continueing the Seroquel 50mg qhs and consider increasing to 75mg if pt continues to require prns  and prns of Seroquel 12.5mg q6hrs prn agitation.  Depakote 250mg TID
Pt is a 72yo M w/ PMHx of CAD (s/p CABG 2019), CKD (unknown stage), DM2, Parkinson's Disease, HTN, depression presents with new onset acute heart failure exacerbation, NSTEMI, started on hep gtt, developed bl RP bleed, acute anemia. Pt is s/p MICU course for hemorrhagic shock, 10/28 sp IR embolization l4 and l5 lumbar artery for permacath and AVF.  Pt's son reports that patient was alert and oriented before he developed his medical complications.  He has been confabulating since the last three weeks.  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  Pt is calm now but his son reports that he had a history of anxiety in the past but never confusion or confabulation as he does now.  Discussed risks/benefits of continuing the Seroquel and the recommendation for increasing it to 25mg PO BID.  11/24: Pt irritable, lethargic, oriented to self only, in restraints, cursing.  Per staff, pt not combative but prone to pulling lines, intermittently verbally inappropriate with staff, very confused.  QTc prolonged.
Pt is a 72yo M w/ PMHx of CAD (s/p CABG 2019), CKD (unknown stage), DM2, Parkinson's Disease, HTN, depression presents with new onset acute heart failure exacerbation, NSTEMI, started on hep gtt, developed bl RP bleed, acute anemia. Pt is s/p MICU course for hemorrhagic shock, 10/28 sp IR embolization l4 and l5 lumbar artery for permacath and AVF.  Pt's son reports that patient was alert and oriented before he developed his medical complications.  He has been confabulating since the last three weeks.  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  Pt is calm now but his son reports that he had a history of anxiety in the past but never confusion or confabulation as he does now.    Pt has had multiple ECGs and all have been without any QTC prolongation except one.    1/26/22 Pt became oversedated and a RRT was called overnight.  Pt is calm and comfortable now but may require a smaller dose of Seroquel 25mg instead of 50mg at bedtime to help with night time agitation and insomnia.   Recommend a lower dose of the Seroquel 25mg qhs and prns of Seroquel 12.5mg q6hrs prn agitation.  Depakote 250mg TID
Pt is a 70yo M w/ PMHx of CAD (s/p CABG 2019), CKD (unknown stage), DM2, Parkinson's Disease, HTN, depression presents with new onset acute heart failure exacerbation, NSTEMI, started on hep gtt, developed bl RP bleed, acute anemia. Pt is s/p MICU course for hemorrhagic shock, 10/28 sp IR embolization l4 and l5 lumbar artery for permacath and AVF.  Pt's son reports that patient was alert and oriented before he developed his medical complications.  He has been confabulating since the last three weeks.  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  Pt is calm now but his son reports that he had a history of anxiety in the past but never confusion or confabulation as he does now.  Discussed risks/benefits of continuing the Seroquel and the recommendation for increasing it to 25mg PO BID.  11/25: Pt less irritable, more alert, still oriented to self only, in restraints.  QTc prolonged, on VPA.
Pt is a 70yo M w/ PMHx of CAD (s/p CABG 2019), CKD (unknown stage), DM2, Parkinson's Disease, HTN, depression presents with new onset acute heart failure exacerbation, NSTEMI, started on hep gtt, developed bl RP bleed, acute anemia. Pt is s/p MICU course for hemorrhagic shock, 10/28 sp IR embolization l4 and l5 lumbar artery for permacath and AVF.  Pt's son reports that patient was alert and oriented before he developed his medical complications.  He has been confabulating since the last three weeks.  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  Pt is calm now but his son reports that he had a history of anxiety in the past but never confusion or confabulation as he does now.    Pt has had multiple ECGs and all have been without any QTC prolongation except one.      Recommend returning to the dose of the Seroquel  to 50mg qhs and prns of Seroquel 12.5mg q6hrs prn agitation.  Depakote 250mg TID
Pt is a 70yo M w/ PMHx of CAD (s/p CABG 2019), CKD (unknown stage), DM2, Parkinson's Disease, HTN, depression presents with new onset acute heart failure exacerbation, NSTEMI, started on hep gtt, developed bl RP bleed, acute anemia. Pt is s/p MICU course for hemorrhagic shock, 10/28 sp IR embolization l4 and l5 lumbar artery for permacath and AVF.  Pt's son reports that patient was alert and oriented before he developed his medical complications.  He has been confabulating since the last three weeks.  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  Pt is calm now but his son reports that he had a history of anxiety in the past but never confusion or confabulation as he does now.    Pt has had multiple ECGs and all have been without any QTC prolongation except one.    Recommend Seroquel 25mg TID and consider increasing to 50mg TID and prns of Seroquel 12.5mg q6hrs prn agitation.  Depakote 250mg TID
Pt is a 72yo M w/ PMHx of CAD (s/p CABG 2019), CKD (unknown stage), DM2, Parkinson's Disease, HTN, depression presents with new onset acute heart failure exacerbation, NSTEMI, started on hep gtt, developed bl RP bleed, acute anemia. Pt is s/p MICU course for hemorrhagic shock, 10/28 sp IR embolization l4 and l5 lumbar artery for permacath and AVF.  Pt's son reports that patient was alert and oriented before he developed his medical complications.  He has been confabulating since the last three weeks.  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  Pt is calm now but his son reports that he had a history of anxiety in the past but never confusion or confabulation as he does now.    Pt has had multiple ECGs and all have been without any QTC prolongation except one.    1/26/22 Pt became oversedated and a RRT was called overnight.  Pt is calm and comfortable now but may require a smaller dose of Seroquel 25mg instead of 50mg at bedtime to help with night time agitation and insomnia.   Recommend a lower dose of the Seroquel 25mg qhs and prns of Seroquel 12.5mg q6hrs prn agitation.  Depakote 250mg TID
Pt is a 70yo M w/ PMHx of CAD (s/p CABG 2019), CKD (unknown stage), DM2, Parkinson's Disease, HTN, depression presents with new onset acute heart failure exacerbation, NSTEMI, started on hep gtt, developed bl RP bleed, acute anemia. Pt is s/p MICU course for hemorrhagic shock, 10/28 sp IR embolization l4 and l5 lumbar artery for permacath and AVF.  Pt's son reports that patient was alert and oriented before he developed his medical complications.  He has been confabulating since the last three weeks.  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  Pt is calm now but his son reports that he had a history of anxiety in the past but never confusion or confabulation as he does now.    Pt has had multiple ECGs and all have been without any QTC prolongation except one.    Recommended restarting the Seroquel 12.5mg TID and prns of Seroquel 12.5mg q6hrs prn agitation.
Pt is a 70yo M w/ PMHx of CAD (s/p CABG 2019), CKD (unknown stage), DM2, Parkinson's Disease, HTN, depression presents with new onset acute heart failure exacerbation, NSTEMI, started on hep gtt, developed bl RP bleed, acute anemia. Pt is s/p MICU course for hemorrhagic shock, 10/28 sp IR embolization l4 and l5 lumbar artery for permacath and AVF.  Pt's son reports that patient was alert and oriented before he developed his medical complications.  He has been confabulating since the last three weeks.  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  Pt is calm now but his son reports that he had a history of anxiety in the past but never confusion or confabulation as he does now.  Discussed risks/benefits of continuing the Seroquel and the recommendation for increasing it to 25mg PO BID.   Pt appears more sedated today on the low dose of Seroquel 12.5mg BID so would recommend keeping the low dose with prns of Seroquel 12.5mg if needed.   consider discontinuing the mitten restrains when pt is not in dialysis.

## 2022-02-10 NOTE — BH CONSULTATION LIAISON PROGRESS NOTE - NSBHMSEBODY_PSY_A_CORE
Average build
Malnourished
Malnourished
Average build

## 2022-02-10 NOTE — PROCEDURE NOTE - SEDATION
Provided by Anesthesia Department

## 2022-02-10 NOTE — PROCEDURE NOTE - ACCESS SITE (IF APPLICABLE)
Right/Internal Jugular/Vein
Right/Common Femoral/Artery
Right/Internal Jugular/Vein
Brachiocephalic fistula/Left

## 2022-02-10 NOTE — BH CONSULTATION LIAISON PROGRESS NOTE - NSBHCHARTREVIEWVS_PSY_A_CORE FT
Vital Signs Last 24 Hrs  T(C): 36.6 (24 Nov 2021 11:24), Max: 37 (23 Nov 2021 19:04)  T(F): 97.9 (24 Nov 2021 11:24), Max: 98.6 (23 Nov 2021 19:04)  HR: 80 (24 Nov 2021 11:24) (79 - 85)  BP: 120/75 (24 Nov 2021 11:24) (120/75 - 144/83)  BP(mean): --  RR: 18 (24 Nov 2021 11:24) (18 - 18)  SpO2: 92% (24 Nov 2021 11:24) (92% - 100%)
Vital Signs Last 24 Hrs  T(C): 36.7 (29 Nov 2021 11:38), Max: 37 (28 Nov 2021 21:42)  T(F): 98 (29 Nov 2021 11:38), Max: 98.6 (28 Nov 2021 21:42)  HR: 116 (29 Nov 2021 11:38) (95 - 116)  BP: 131/89 (29 Nov 2021 11:38) (122/69 - 138/81)  BP(mean): --  RR: 18 (29 Nov 2021 11:38) (18 - 18)  SpO2: 100% (29 Nov 2021 11:38) (98% - 100%)
Vital Signs Last 24 Hrs  T(C): 36.3 (31 Jan 2022 10:37), Max: 36.4 (31 Jan 2022 04:52)  T(F): 97.3 (31 Jan 2022 10:37), Max: 97.6 (31 Jan 2022 04:52)  HR: 91 (31 Jan 2022 10:37) (61 - 91)  BP: 157/95 (31 Jan 2022 10:37) (114/79 - 159/77)  BP(mean): --  RR: 18 (31 Jan 2022 10:37) (18 - 18)  SpO2: 95% (31 Jan 2022 10:37) (95% - 96%)
Vital Signs Last 24 Hrs  T(C): 36.7 (26 Nov 2021 11:29), Max: 37 (26 Nov 2021 05:02)  T(F): 98.1 (26 Nov 2021 11:29), Max: 98.6 (26 Nov 2021 05:02)  HR: 65 (26 Nov 2021 11:29) (65 - 108)  BP: 135/69 (26 Nov 2021 11:29) (123/66 - 135/69)  BP(mean): --  RR: 18 (26 Nov 2021 11:29) (18 - 20)  SpO2: 98% (26 Nov 2021 11:29) (98% - 100%)
Vital Signs Last 24 Hrs  T(C): 36.3 (10 Feb 2022 21:55), Max: 36.9 (10 Feb 2022 04:41)  T(F): 97.4 (10 Feb 2022 21:55), Max: 98.4 (10 Feb 2022 04:41)  HR: 122 (10 Feb 2022 21:55) (116 - 122)  BP: 138/90 (10 Feb 2022 21:55) (116/76 - 160/97)  BP(mean): 103 (10 Feb 2022 21:00) (92 - 104)  RR: 20 (10 Feb 2022 21:55) (16 - 20)  SpO2: 95% (10 Feb 2022 21:55) (91% - 100%)
Vital Signs Last 24 Hrs  T(C): 36.7 (26 Jan 2022 10:58), Max: 37 (26 Jan 2022 04:55)  T(F): 98.1 (26 Jan 2022 10:58), Max: 98.6 (26 Jan 2022 04:55)  HR: 106 (26 Jan 2022 10:58) (102 - 106)  BP: 146/93 (26 Jan 2022 10:58) (146/93 - 160/89)  BP(mean): --  RR: 18 (26 Jan 2022 10:58) (18 - 20)  SpO2: 94% (26 Jan 2022 10:58) (93% - 94%)
Vital Signs Last 24 Hrs  T(C): 37.1 (22 Nov 2021 17:37), Max: 37.1 (22 Nov 2021 17:37)  T(F): 98.7 (22 Nov 2021 17:37), Max: 98.7 (22 Nov 2021 17:37)  HR: 100 (22 Nov 2021 17:37) (82 - 103)  BP: 135/75 (22 Nov 2021 17:37) (116/62 - 150/80)  BP(mean): --  RR: 18 (22 Nov 2021 17:37) (17 - 18)  SpO2: 100% (22 Nov 2021 17:37) (97% - 100%)
Vital Signs Last 24 Hrs  T(C): 36.7 (15 Dec 2021 10:53), Max: 36.7 (15 Dec 2021 10:53)  T(F): 98 (15 Dec 2021 10:53), Max: 98 (15 Dec 2021 10:53)  HR: 80 (15 Dec 2021 10:53) (80 - 101)  BP: 127/74 (15 Dec 2021 10:53) (101/61 - 134/72)  BP(mean): --  RR: 18 (15 Dec 2021 10:53) (18 - 18)  SpO2: 98% (15 Dec 2021 10:53) (94% - 99%)
Vital Signs Last 24 Hrs  T(C): 36.9 (17 Nov 2021 12:47), Max: 36.9 (16 Nov 2021 20:15)  T(F): 98.4 (17 Nov 2021 12:47), Max: 98.5 (16 Nov 2021 20:15)  HR: 80 (17 Nov 2021 12:47) (80 - 105)  BP: 134/81 (17 Nov 2021 12:47) (128/60 - 156/82)  BP(mean): --  RR: 18 (17 Nov 2021 12:47) (18 - 20)  SpO2: 100% (17 Nov 2021 12:47) (93% - 100%)
Vital Signs Last 24 Hrs  T(C): 36.3 (18 Jan 2022 12:19), Max: 36.8 (17 Jan 2022 21:27)  T(F): 97.4 (18 Jan 2022 12:19), Max: 98.3 (17 Jan 2022 21:27)  HR: 82 (18 Jan 2022 12:19) (77 - 84)  BP: 116/61 (18 Jan 2022 12:19) (110/68 - 130/70)  BP(mean): --  RR: 18 (18 Jan 2022 12:19) (17 - 18)  SpO2: 96% (18 Jan 2022 12:19) (96% - 99%)

## 2022-02-10 NOTE — GOALS OF CARE CONVERSATION - ADVANCED CARE PLANNING - CONVERSATION DETAILS
full note to f/u   d/w the patient's son and HCP (Yunier)  that re-stated that while the patient is alert and able to have some interaction with his son, GOC are for trying to improve the patient's condition and get him to [facility]. However, if the patient's is not consistently not able to have a meaningful interaction with his family that then his son may be willing to re-address GOC. His HCP understands that the patient pulling his PEG or HD access can result on a life threatening situation; however, at this point, while a PEG and HD are offered, and while the patient is having some interaction with his son, not having those Rxs are not acceptable options for the HCP.     Will sing off.         Dennis Juarez MD   Geriatrics and Palliative Care (GAP) Consult Service    of Geriatric and Palliative Medicine  [facility]      Please page the following number for clinical matters between the hours of 9 am and 5 pm from Monday through Friday : [phone]    After 5pm and on weekends, please see the contact information below:    In the event of newly developing, evolving, or worsening symptoms, please contact the Palliative Medicine team via pager (if the patient is at [facility] #[phone] or if the patient is at [facility] #[phone]) The Geriatric and Palliative Medicine service has coverage 24 hours a day/ 7 days a week to provide medical recommendations regarding symptom management needs via telephone d/w the patient's son and HCP (Nikitasheron)  that re-stated that while the patient is alert and able to have some interaction with his son, GOC are for trying to improve the patient's condition and get him to [facility]. However, if the patient's is consistently not able to have a meaningful interaction with his family that then his son may be willing to re-address GOC. His HCP understands that if the patient pulls on his PEG or HD access  that it can result on a life threatening situation; however, at this point, while a PEG and HD are offered, and while the patient is having some interaction with his son, not having those Rxs are not acceptable options for the HCP.     Will sing off.         Dennis Juarez MD   Geriatrics and Palliative Care (GAP) Consult Service    of Geriatric and Palliative Medicine  [facility]      Please page the following number for clinical matters between the hours of 9 am and 5 pm from Monday through Friday : [phone]    After 5pm and on weekends, please see the contact information below:    In the event of newly developing, evolving, or worsening symptoms, please contact the Palliative Medicine team via pager (if the patient is at [facility] #[phone] or if the patient is at [facility] #[phone]) The Geriatric and Palliative Medicine service has coverage 24 hours a day/ 7 days a week to provide medical recommendations regarding symptom management needs via telephone

## 2022-02-10 NOTE — BH CONSULTATION LIAISON PROGRESS NOTE - NSBHMSETHTCONTENT_PSY_A_CORE
Unremarkable
Delusions
Unremarkable

## 2022-02-10 NOTE — PACU DISCHARGE NOTE - PAIN:
Controlled with current regime

## 2022-02-10 NOTE — BH CONSULTATION LIAISON PROGRESS NOTE - NSBHFUPINTERVALHXFT_PSY_A_CORE
Pt is currently drowsy and still in mitten restraints.  Recommended using mitten restraints only in dialysis since pt is calm and continues on enhanced when in his room.  He was reportedly agitated earlier but only when being changed. 
Psych f/u requested for management of agitation, pt currently in restraints. Covering for Dr. Sood.  Pt on interview oriented to self only, irritable, states [place] are [place], cannot provide month/year.  States he is sleeping, does not want to be bothered, cursing, "here we go again with the questions."  Per PCA, pt is very confused, intermittently inappropriate towards staff/says things that are disrespectful.
Pt has had multiple ECGs and all have been without any QTC prolongation except one.  Recommended restarting the Seroquel 12.5mg TID and prns of Seroquel 12.5mg q6hrs prn agitation.
 Pt is calm and comfortable now on a smaller dose of Seroquel 25mg instead of 50mg at bedtime to help with night time agitation and insomnia. He also continues his Remeron 7.5mg daily. 
Pt did receive a prn dose  of Seroquel 12.5mg q6hrs earlier today for agitation, but was calm and cooperative when seen in the afternoon.
Following up for agitation management, covering for Dr. Sood.  QTc prolonged; Seroquel held and pt started on VPA.  Pt again oriented to self only, less irritable but very disorganized, states this is the top floor of his house, we should stop at the deli, call him in a few days.  Still disoriented to place/date.  Denies SI/HI.  Endorses V/H of "small orange bugs crawling up my nose." 
Pt became oversedated and a RRT was called overnight.  Pt is calm and comfortable now but may require a smaller dose of Seroquel 25mg instead of 50mg at bedtime to help with night time agitation and insomnia. 
 Pt had been very calm and comfortable on a smaller dose of Seroquel 25mg instead of 50mg at bedtime to help with night time agitation and insomnia. He reportedly pulled out his central line and his peg -tube and required placement of both today. He is unaware that he pulled out any lines and denies that he did anything to harm himself.  He may benefit from going back to the higher dose Seroquel and consider adding a mood stabilizer like Depakote.  He also continues his Remeron 7.5mg daily. 
Events noted regarding pt pulling out his permacath. He does not recall pulling out any lines and says he does not understand why he has mitten restraints on his hands.  Pt would benefit from an increase in his medications.   Pt has had multiple ECGs and all have been without any QTC prolongation except one.  Recommended  Seroquel 25mg TID but consider increasing to 50mg TID    and prns of Seroquel 12.5mg q6hrs prn agitation.

## 2022-02-10 NOTE — BH CONSULTATION LIAISON PROGRESS NOTE - NSBHPSYCHOLCOGABN_PSY_A_CORE
disoriented to time/disoriented to place/disoriented to situation

## 2022-02-10 NOTE — BH CONSULTATION LIAISON PROGRESS NOTE - LEVEL OF CONSCIOUSNESS
Alert
Lethargic, arousable to verbal stimulus
Lethargic, arousable to verbal stimulus
Alert
Lethargic, arousable to verbal stimulus
Alert
Lethargic, arousable to verbal stimulus

## 2022-02-10 NOTE — PROGRESS NOTE ADULT - ASSESSMENT
Impression:    Incontinence of bowel and Bladder  Incontinence Dermatitis  Pressure Ulcer Prophylaxis    Recommend:  1.) topical therapy: sacral/buttock  - cleanse with  incontinence cleanser, pat dry, apply Triad ointment twice daily and PRN for incontinent episodes  2.) Incontinence Management - incontinence cleanser, pads, pericare BID  3.) Maintain on an alternating air with low air loss surface  4.) Turn and reposition Q 2 hours  5.) Nutrition optimization  6.) Offload heels/feet with complete cair air fluidized boots; ensure that the soles of the feet are not resting on the foot board of the bed.    Care as per medicine. Will not follow but will remain available. Please recall for new issues or deterioration.  Upon discharge [facility] [address] [place] [address] [phone]  Thank you for this consult  Chelsey Cook, NP-C, [facility]N [id number]

## 2022-02-10 NOTE — PROCEDURE NOTE - PROCEDURE DATE TIME, MLM
11-Nov-2021 17:35
02-Feb-2022 16:52
28-Oct-2021
04-Feb-2022 14:38
14-Dec-2021 11:32
10-Feb-2022 19:46

## 2022-02-10 NOTE — PACU DISCHARGE NOTE - NSPTMEETSDISCHCRITERIADT_GEN_A_CORE
10-Paul-2022 16:36
10-Feb-2022 20:44
14-Dec-2021 14:30
02-Feb-2022 17:18
12-Nov-2021 14:38
11-Nov-2021 18:20

## 2022-02-10 NOTE — BH CONSULTATION LIAISON PROGRESS NOTE - CURRENT MEDICATION
MEDICATIONS  (STANDING):  atorvastatin 80 milliGRAM(s) Oral at bedtime  carbidopa/levodopa  25/100 2.5 Tablet(s) Oral <User Schedule>  carbidopa/levodopa  25/100 2 Tablet(s) Oral <User Schedule>  chlorhexidine 4% Liquid 1 Application(s) Topical <User Schedule>  cinacalcet 60 milliGRAM(s) Oral daily  dextrose 40% Gel 15 Gram(s) Oral once  dextrose 5%. 1000 milliLiter(s) (50 mL/Hr) IV Continuous <Continuous>  dextrose 5%. 1000 milliLiter(s) (100 mL/Hr) IV Continuous <Continuous>  dextrose 50% Injectable 25 Gram(s) IV Push once  dextrose 50% Injectable 12.5 Gram(s) IV Push once  dextrose 50% Injectable 25 Gram(s) IV Push once  diVALproex Sprinkle 250 milliGRAM(s) Oral three times a day  DULoxetine 20 milliGRAM(s) Oral daily  folic acid 1 milliGRAM(s) Oral daily  glucagon  Injectable 1 milliGRAM(s) IntraMuscular once  heparin   Injectable 5000 Unit(s) SubCutaneous every 12 hours  insulin glargine Injectable (LANTUS) 6 Unit(s) SubCutaneous at bedtime  insulin lispro (ADMELOG) corrective regimen sliding scale   SubCutaneous every 6 hours  levothyroxine 25 MICROGram(s) Oral daily  memantine 5 milliGRAM(s) Oral at bedtime  metoprolol tartrate 50 milliGRAM(s) Oral two times a day  midodrine 10 milliGRAM(s) Oral <User Schedule>  mirtazapine 7.5 milliGRAM(s) Oral daily  Nephro-celeste 1 Tablet(s) Oral daily  pantoprazole   Suspension 40 milliGRAM(s) Enteral Tube two times a day  QUEtiapine 25 milliGRAM(s) Oral at bedtime  trihexyphenidyl 2 milliGRAM(s) Oral three times a day    MEDICATIONS  (PRN):  acetaminophen     Tablet .. 650 milliGRAM(s) Oral every 6 hours PRN Mild Pain (1 - 3)  albuterol/ipratropium for Nebulization 3 milliLiter(s) Nebulizer every 6 hours PRN Shortness of Breath and/or Wheezing  diVALproex Sprinkle 125 milliGRAM(s) Oral every 8 hours PRN Agitation  guaiFENesin Oral Liquid (Sugar-Free) 200 milliGRAM(s) Oral every 6 hours PRN Cough  ondansetron Injectable 4 milliGRAM(s) IV Push once PRN Nausea and/or Vomiting  QUEtiapine 12.5 milliGRAM(s) Oral every 6 hours PRN agitation  sodium chloride 0.9% lock flush 10 milliLiter(s) IV Push every 1 hour PRN Pre/post blood products, medications, blood draw, and to maintain line patency  
MEDICATIONS  (STANDING):  atorvastatin 80 milliGRAM(s) Oral at bedtime  carbidopa/levodopa  25/100 2.5 Tablet(s) Oral <User Schedule>  carbidopa/levodopa  25/100 2 Tablet(s) Oral <User Schedule>  chlorhexidine 4% Liquid 1 Application(s) Topical <User Schedule>  cinacalcet 60 milliGRAM(s) Oral daily  dextrose 40% Gel 15 Gram(s) Oral once  dextrose 5%. 1000 milliLiter(s) (50 mL/Hr) IV Continuous <Continuous>  dextrose 5%. 1000 milliLiter(s) (100 mL/Hr) IV Continuous <Continuous>  dextrose 5%. 1000 milliLiter(s) (40 mL/Hr) IV Continuous <Continuous>  dextrose 50% Injectable 25 Gram(s) IV Push once  dextrose 50% Injectable 12.5 Gram(s) IV Push once  dextrose 50% Injectable 25 Gram(s) IV Push once  dextrose 50% Injectable 12.5 Gram(s) IV Push once  dextrose 50% Injectable 25 Gram(s) IV Push once  diVALproex Sprinkle 250 milliGRAM(s) Oral three times a day  DULoxetine 20 milliGRAM(s) Oral daily  folic acid 1 milliGRAM(s) Oral daily  glucagon  Injectable 1 milliGRAM(s) IntraMuscular once  insulin lispro (ADMELOG) corrective regimen sliding scale   SubCutaneous every 6 hours  levothyroxine Injectable 12.5 MICROGram(s) IV Push <User Schedule>  memantine 5 milliGRAM(s) Oral at bedtime  metoprolol tartrate Injectable 5 milliGRAM(s) IV Push every 6 hours  midodrine 10 milliGRAM(s) Oral <User Schedule>  mirtazapine 7.5 milliGRAM(s) Oral daily  Nephro-celeste 1 Tablet(s) Oral daily  pantoprazole  Injectable 40 milliGRAM(s) IV Push two times a day  QUEtiapine 25 milliGRAM(s) Oral at bedtime  sodium chloride 0.9%. 1000 milliLiter(s) (75 mL/Hr) IV Continuous <Continuous>  tamsulosin 0.4 milliGRAM(s) Oral at bedtime  trihexyphenidyl 2 milliGRAM(s) Oral three times a day    MEDICATIONS  (PRN):  acetaminophen     Tablet .. 650 milliGRAM(s) Oral every 6 hours PRN Mild Pain (1 - 3)  albuterol/ipratropium for Nebulization 3 milliLiter(s) Nebulizer every 6 hours PRN Shortness of Breath and/or Wheezing  diVALproex Sprinkle 125 milliGRAM(s) Oral every 8 hours PRN Agitation  guaiFENesin Oral Liquid (Sugar-Free) 200 milliGRAM(s) Oral every 6 hours PRN Cough  QUEtiapine 12.5 milliGRAM(s) Oral every 6 hours PRN agitation  sodium chloride 0.9% lock flush 10 milliLiter(s) IV Push every 1 hour PRN Pre/post blood products, medications, blood draw, and to maintain line patency  
MEDICATIONS  (STANDING):  atorvastatin 80 milliGRAM(s) Oral at bedtime  carbidopa/levodopa  25/100 2.5 Tablet(s) Oral <User Schedule>  carbidopa/levodopa  25/100 2 Tablet(s) Oral <User Schedule>  chlorhexidine 4% Liquid 1 Application(s) Topical <User Schedule>  cholecalciferol 1000 Unit(s) Oral daily  dextrose 40% Gel 15 Gram(s) Oral once  dextrose 5%. 1000 milliLiter(s) (50 mL/Hr) IV Continuous <Continuous>  dextrose 5%. 1000 milliLiter(s) (100 mL/Hr) IV Continuous <Continuous>  dextrose 50% Injectable 25 Gram(s) IV Push once  dextrose 50% Injectable 12.5 Gram(s) IV Push once  dextrose 50% Injectable 25 Gram(s) IV Push once  diVALproex Sprinkle 250 milliGRAM(s) Oral three times a day  DULoxetine 20 milliGRAM(s) Oral daily  folic acid 1 milliGRAM(s) Oral daily  glucagon  Injectable 1 milliGRAM(s) IntraMuscular once  insulin glargine Injectable (LANTUS) 8 Unit(s) SubCutaneous at bedtime  insulin lispro (ADMELOG) corrective regimen sliding scale   SubCutaneous three times a day before meals  insulin lispro (ADMELOG) corrective regimen sliding scale   SubCutaneous at bedtime  levothyroxine 50 MICROGram(s) Oral daily  memantine 5 milliGRAM(s) Oral at bedtime  metoprolol tartrate 50 milliGRAM(s) Oral two times a day  midodrine 10 milliGRAM(s) Oral <User Schedule>  Nephro-celeste 1 Tablet(s) Oral daily  polyethylene glycol 3350 17 Gram(s) Oral two times a day  QUEtiapine 25 milliGRAM(s) Oral three times a day  senna Syrup 10 milliLiter(s) Oral at bedtime  trihexyphenidyl 2 milliGRAM(s) Oral three times a day    MEDICATIONS  (PRN):  acetaminophen     Tablet .. 650 milliGRAM(s) Oral every 6 hours PRN Mild Pain (1 - 3)  albuterol/ipratropium for Nebulization 3 milliLiter(s) Nebulizer every 6 hours PRN Shortness of Breath and/or Wheezing  diVALproex Sprinkle 125 milliGRAM(s) Oral every 8 hours PRN Agitation  guaiFENesin Oral Liquid (Sugar-Free) 200 milliGRAM(s) Oral every 6 hours PRN Cough  HYDROmorphone  Injectable 0.25 milliGRAM(s) IV Push every 10 minutes PRN Moderate Pain (4 - 6)  ondansetron Injectable 4 milliGRAM(s) IV Push once PRN Nausea and/or Vomiting  QUEtiapine 12.5 milliGRAM(s) Oral every 6 hours PRN agitation  sodium chloride 0.9% lock flush 10 milliLiter(s) IV Push every 1 hour PRN Pre/post blood products, medications, blood draw, and to maintain line patency  
MEDICATIONS  (STANDING):  atorvastatin 80 milliGRAM(s) Oral at bedtime  carbidopa/levodopa  25/100 2.5 Tablet(s) Oral <User Schedule>  carbidopa/levodopa  25/100 2 Tablet(s) Oral <User Schedule>  chlorhexidine 4% Liquid 1 Application(s) Topical <User Schedule>  cinacalcet 60 milliGRAM(s) Oral daily  dextrose 40% Gel 15 Gram(s) Oral once  dextrose 5%. 1000 milliLiter(s) (50 mL/Hr) IV Continuous <Continuous>  dextrose 5%. 1000 milliLiter(s) (100 mL/Hr) IV Continuous <Continuous>  dextrose 50% Injectable 25 Gram(s) IV Push once  dextrose 50% Injectable 12.5 Gram(s) IV Push once  dextrose 50% Injectable 25 Gram(s) IV Push once  diVALproex Sprinkle 250 milliGRAM(s) Oral three times a day  DULoxetine 20 milliGRAM(s) Oral daily  folic acid 1 milliGRAM(s) Oral daily  glucagon  Injectable 1 milliGRAM(s) IntraMuscular once  heparin   Injectable 5000 Unit(s) SubCutaneous every 12 hours  insulin glargine Injectable (LANTUS) 6 Unit(s) SubCutaneous at bedtime  insulin lispro (ADMELOG) corrective regimen sliding scale   SubCutaneous every 6 hours  levothyroxine 25 MICROGram(s) Oral daily  memantine 5 milliGRAM(s) Oral at bedtime  metoprolol tartrate 50 milliGRAM(s) Oral two times a day  midodrine 10 milliGRAM(s) Oral <User Schedule>  mirtazapine 7.5 milliGRAM(s) Oral daily  Nephro-celeste 1 Tablet(s) Oral daily  pantoprazole   Suspension 40 milliGRAM(s) Enteral Tube two times a day  QUEtiapine 25 milliGRAM(s) Oral at bedtime  trihexyphenidyl 2 milliGRAM(s) Oral three times a day    MEDICATIONS  (PRN):  acetaminophen     Tablet .. 650 milliGRAM(s) Oral every 6 hours PRN Mild Pain (1 - 3)  albuterol/ipratropium for Nebulization 3 milliLiter(s) Nebulizer every 6 hours PRN Shortness of Breath and/or Wheezing  diVALproex Sprinkle 125 milliGRAM(s) Oral every 8 hours PRN Agitation  guaiFENesin Oral Liquid (Sugar-Free) 200 milliGRAM(s) Oral every 6 hours PRN Cough  ondansetron Injectable 4 milliGRAM(s) IV Push once PRN Nausea and/or Vomiting  QUEtiapine 12.5 milliGRAM(s) Oral every 6 hours PRN agitation  sodium chloride 0.9% lock flush 10 milliLiter(s) IV Push every 1 hour PRN Pre/post blood products, medications, blood draw, and to maintain line patency  
MEDICATIONS  (STANDING):  atorvastatin 80 milliGRAM(s) Oral at bedtime  carbidopa/levodopa  25/100 2.5 Tablet(s) Oral <User Schedule>  carbidopa/levodopa  25/100 2 Tablet(s) Oral <User Schedule>  chlorhexidine 4% Liquid 1 Application(s) Topical <User Schedule>  cholecalciferol 1000 Unit(s) Oral daily  dextrose 5%. 1000 milliLiter(s) (50 mL/Hr) IV Continuous <Continuous>  dextrose 5%. 1000 milliLiter(s) (100 mL/Hr) IV Continuous <Continuous>  dextrose 50% Injectable 25 Gram(s) IV Push once  dextrose 50% Injectable 12.5 Gram(s) IV Push once  dextrose 50% Injectable 25 Gram(s) IV Push once  diVALproex Sprinkle 250 milliGRAM(s) Oral two times a day  folic acid 1 milliGRAM(s) Oral daily  glucagon  Injectable 1 milliGRAM(s) IntraMuscular once  insulin glargine Injectable (LANTUS) 8 Unit(s) SubCutaneous at bedtime  insulin lispro (ADMELOG) corrective regimen sliding scale   SubCutaneous three times a day before meals  insulin lispro (ADMELOG) corrective regimen sliding scale   SubCutaneous at bedtime  levothyroxine 25 MICROGram(s) Oral daily  memantine 5 milliGRAM(s) Oral at bedtime  metoprolol tartrate 25 milliGRAM(s) Oral two times a day  midodrine 10 milliGRAM(s) Oral <User Schedule>  Nephro-celeste 1 Tablet(s) Oral daily  polyethylene glycol 3350 17 Gram(s) Oral two times a day  senna Syrup 10 milliLiter(s) Oral at bedtime  trihexyphenidyl 2 milliGRAM(s) Oral three times a day    MEDICATIONS  (PRN):  acetaminophen     Tablet .. 650 milliGRAM(s) Oral every 6 hours PRN Mild Pain (1 - 3)  albuterol/ipratropium for Nebulization 3 milliLiter(s) Nebulizer every 6 hours PRN Shortness of Breath and/or Wheezing  diVALproex Sprinkle 125 milliGRAM(s) Oral every 8 hours PRN Agitation  guaiFENesin Oral Liquid (Sugar-Free) 200 milliGRAM(s) Oral every 6 hours PRN Cough  sodium chloride 0.9% lock flush 10 milliLiter(s) IV Push every 1 hour PRN Pre/post blood products, medications, blood draw, and to maintain line patency  
MEDICATIONS  (STANDING):  atorvastatin 80 milliGRAM(s) Oral at bedtime  carbidopa/levodopa  25/100 2.5 Tablet(s) Oral <User Schedule>  carbidopa/levodopa  25/100 2 Tablet(s) Oral <User Schedule>  chlorhexidine 4% Liquid 1 Application(s) Topical <User Schedule>  cholecalciferol 1000 Unit(s) Oral daily  cinacalcet 60 milliGRAM(s) Oral daily  dextrose 40% Gel 15 Gram(s) Oral once  dextrose 5%. 1000 milliLiter(s) (50 mL/Hr) IV Continuous <Continuous>  dextrose 5%. 1000 milliLiter(s) (100 mL/Hr) IV Continuous <Continuous>  dextrose 50% Injectable 25 Gram(s) IV Push once  dextrose 50% Injectable 12.5 Gram(s) IV Push once  dextrose 50% Injectable 25 Gram(s) IV Push once  diVALproex Sprinkle 250 milliGRAM(s) Oral three times a day  DULoxetine 20 milliGRAM(s) Oral daily  folic acid 1 milliGRAM(s) Oral daily  glucagon  Injectable 1 milliGRAM(s) IntraMuscular once  heparin   Injectable 5000 Unit(s) SubCutaneous every 12 hours  insulin lispro (ADMELOG) corrective regimen sliding scale   SubCutaneous three times a day before meals  insulin lispro (ADMELOG) corrective regimen sliding scale   SubCutaneous at bedtime  levothyroxine 25 MICROGram(s) Oral daily  memantine 5 milliGRAM(s) Oral at bedtime  metoprolol tartrate 50 milliGRAM(s) Oral two times a day  midodrine 10 milliGRAM(s) Oral <User Schedule>  mirtazapine 7.5 milliGRAM(s) Oral daily  Nephro-celeste 1 Tablet(s) Oral daily  pantoprazole   Suspension 40 milliGRAM(s) Enteral Tube two times a day  polyethylene glycol 3350 17 Gram(s) Oral two times a day  QUEtiapine 50 milliGRAM(s) Oral at bedtime  senna Syrup 10 milliLiter(s) Oral at bedtime  trihexyphenidyl 2 milliGRAM(s) Oral three times a day    MEDICATIONS  (PRN):  acetaminophen     Tablet .. 650 milliGRAM(s) Oral every 6 hours PRN Mild Pain (1 - 3)  albuterol/ipratropium for Nebulization 3 milliLiter(s) Nebulizer every 6 hours PRN Shortness of Breath and/or Wheezing  diVALproex Sprinkle 125 milliGRAM(s) Oral every 8 hours PRN Agitation  guaiFENesin Oral Liquid (Sugar-Free) 200 milliGRAM(s) Oral every 6 hours PRN Cough  ondansetron Injectable 4 milliGRAM(s) IV Push once PRN Nausea and/or Vomiting  QUEtiapine 12.5 milliGRAM(s) Oral every 6 hours PRN agitation  sodium chloride 0.9% lock flush 10 milliLiter(s) IV Push every 1 hour PRN Pre/post blood products, medications, blood draw, and to maintain line patency  
MEDICATIONS  (STANDING):  atorvastatin 80 milliGRAM(s) Oral at bedtime  carbidopa/levodopa  25/100 2 Tablet(s) Oral <User Schedule>  carbidopa/levodopa  25/100 2.5 Tablet(s) Oral <User Schedule>  chlorhexidine 4% Liquid 1 Application(s) Topical <User Schedule>  cholecalciferol 1000 Unit(s) Oral daily  dextrose 5%. 1000 milliLiter(s) (50 mL/Hr) IV Continuous <Continuous>  dextrose 5%. 1000 milliLiter(s) (100 mL/Hr) IV Continuous <Continuous>  dextrose 50% Injectable 25 Gram(s) IV Push once  dextrose 50% Injectable 12.5 Gram(s) IV Push once  dextrose 50% Injectable 25 Gram(s) IV Push once  folic acid 1 milliGRAM(s) Oral daily  glucagon  Injectable 1 milliGRAM(s) IntraMuscular once  insulin glargine Injectable (LANTUS) 6 Unit(s) SubCutaneous at bedtime  insulin lispro (ADMELOG) corrective regimen sliding scale   SubCutaneous three times a day before meals  insulin lispro (ADMELOG) corrective regimen sliding scale   SubCutaneous at bedtime  levothyroxine 25 MICROGram(s) Oral daily  memantine 5 milliGRAM(s) Oral at bedtime  metoprolol tartrate 25 milliGRAM(s) Oral two times a day  midodrine 10 milliGRAM(s) Oral <User Schedule>  Nephro-celeste 1 Tablet(s) Oral daily  polyethylene glycol 3350 17 Gram(s) Oral two times a day  QUEtiapine 12.5 milliGRAM(s) Oral daily  senna Syrup 10 milliLiter(s) Oral at bedtime  trihexyphenidyl 2 milliGRAM(s) Oral three times a day    MEDICATIONS  (PRN):  acetaminophen     Tablet .. 650 milliGRAM(s) Oral every 6 hours PRN Mild Pain (1 - 3)  albuterol/ipratropium for Nebulization 3 milliLiter(s) Nebulizer every 6 hours PRN Shortness of Breath and/or Wheezing  guaiFENesin Oral Liquid (Sugar-Free) 200 milliGRAM(s) Oral every 6 hours PRN Cough  QUEtiapine 25 milliGRAM(s) Oral every 6 hours PRN Agitation  sodium chloride 0.9% lock flush 10 milliLiter(s) IV Push every 1 hour PRN Pre/post blood products, medications, blood draw, and to maintain line patency  
MEDICATIONS  (STANDING):  ascorbic acid 500 milliGRAM(s) Oral daily  atorvastatin 80 milliGRAM(s) Oral at bedtime  carbidopa/levodopa  25/100 2.5 Tablet(s) Oral <User Schedule>  carbidopa/levodopa  25/100 2 Tablet(s) Oral <User Schedule>  chlorhexidine 4% Liquid 1 Application(s) Topical <User Schedule>  cholecalciferol 1000 Unit(s) Oral daily  dextrose 5%. 1000 milliLiter(s) (50 mL/Hr) IV Continuous <Continuous>  dextrose 5%. 1000 milliLiter(s) (100 mL/Hr) IV Continuous <Continuous>  dextrose 50% Injectable 25 Gram(s) IV Push once  dextrose 50% Injectable 25 Gram(s) IV Push once  dextrose 50% Injectable 12.5 Gram(s) IV Push once  folic acid 1 milliGRAM(s) Oral daily  glucagon  Injectable 1 milliGRAM(s) IntraMuscular once  insulin glargine Injectable (LANTUS) 6 Unit(s) SubCutaneous at bedtime  insulin lispro (ADMELOG) corrective regimen sliding scale   SubCutaneous three times a day before meals  insulin lispro (ADMELOG) corrective regimen sliding scale   SubCutaneous at bedtime  levothyroxine 25 MICROGram(s) Oral daily  memantine 5 milliGRAM(s) Oral at bedtime  metoprolol tartrate 25 milliGRAM(s) Oral two times a day  midodrine 20 milliGRAM(s) Oral <User Schedule>  polyethylene glycol 3350 17 Gram(s) Oral two times a day  QUEtiapine 12.5 milliGRAM(s) Oral two times a day  senna Syrup 10 milliLiter(s) Oral at bedtime  trihexyphenidyl 2 milliGRAM(s) Oral three times a day    MEDICATIONS  (PRN):  acetaminophen     Tablet .. 650 milliGRAM(s) Oral every 6 hours PRN Mild Pain (1 - 3)  albuterol/ipratropium for Nebulization 3 milliLiter(s) Nebulizer every 6 hours PRN Shortness of Breath and/or Wheezing  guaiFENesin Oral Liquid (Sugar-Free) 200 milliGRAM(s) Oral every 6 hours PRN Cough  QUEtiapine 25 milliGRAM(s) Oral every 6 hours PRN Agitation  sodium chloride 0.9% lock flush 10 milliLiter(s) IV Push every 1 hour PRN Pre/post blood products, medications, blood draw, and to maintain line patency  
MEDICATIONS  (STANDING):  atorvastatin 80 milliGRAM(s) Oral at bedtime  carbidopa/levodopa  25/100 2.5 Tablet(s) Oral <User Schedule>  carbidopa/levodopa  25/100 2 Tablet(s) Oral <User Schedule>  chlorhexidine 4% Liquid 1 Application(s) Topical <User Schedule>  cholecalciferol 1000 Unit(s) Oral daily  dextrose 5%. 1000 milliLiter(s) (50 mL/Hr) IV Continuous <Continuous>  dextrose 5%. 1000 milliLiter(s) (100 mL/Hr) IV Continuous <Continuous>  dextrose 50% Injectable 25 Gram(s) IV Push once  dextrose 50% Injectable 25 Gram(s) IV Push once  dextrose 50% Injectable 12.5 Gram(s) IV Push once  diVALproex Sprinkle 250 milliGRAM(s) Oral two times a day  folic acid 1 milliGRAM(s) Oral daily  glucagon  Injectable 1 milliGRAM(s) IntraMuscular once  insulin glargine Injectable (LANTUS) 8 Unit(s) SubCutaneous at bedtime  insulin lispro (ADMELOG) corrective regimen sliding scale   SubCutaneous three times a day before meals  insulin lispro (ADMELOG) corrective regimen sliding scale   SubCutaneous at bedtime  levothyroxine 25 MICROGram(s) Oral daily  memantine 5 milliGRAM(s) Oral at bedtime  metoprolol tartrate 25 milliGRAM(s) Oral two times a day  midodrine 10 milliGRAM(s) Oral <User Schedule>  Nephro-celeste 1 Tablet(s) Oral daily  polyethylene glycol 3350 17 Gram(s) Oral two times a day  QUEtiapine 12.5 milliGRAM(s) Oral three times a day  senna Syrup 10 milliLiter(s) Oral at bedtime  trihexyphenidyl 2 milliGRAM(s) Oral three times a day    MEDICATIONS  (PRN):  acetaminophen     Tablet .. 650 milliGRAM(s) Oral every 6 hours PRN Mild Pain (1 - 3)  albuterol/ipratropium for Nebulization 3 milliLiter(s) Nebulizer every 6 hours PRN Shortness of Breath and/or Wheezing  diVALproex Sprinkle 125 milliGRAM(s) Oral every 8 hours PRN Agitation  guaiFENesin Oral Liquid (Sugar-Free) 200 milliGRAM(s) Oral every 6 hours PRN Cough  QUEtiapine 12.5 milliGRAM(s) Oral every 6 hours PRN agitation  sodium chloride 0.9% lock flush 10 milliLiter(s) IV Push every 1 hour PRN Pre/post blood products, medications, blood draw, and to maintain line patency  
MEDICATIONS  (STANDING):  atorvastatin 80 milliGRAM(s) Oral at bedtime  carbidopa/levodopa  25/100 2.5 Tablet(s) Oral <User Schedule>  carbidopa/levodopa  25/100 2 Tablet(s) Oral <User Schedule>  chlorhexidine 4% Liquid 1 Application(s) Topical <User Schedule>  cholecalciferol 1000 Unit(s) Oral daily  dextrose 5%. 1000 milliLiter(s) (50 mL/Hr) IV Continuous <Continuous>  dextrose 5%. 1000 milliLiter(s) (100 mL/Hr) IV Continuous <Continuous>  dextrose 50% Injectable 25 Gram(s) IV Push once  dextrose 50% Injectable 12.5 Gram(s) IV Push once  dextrose 50% Injectable 25 Gram(s) IV Push once  epoetin mari-epbx (RETACRIT) Injectable 69716 Unit(s) IV Push once  folic acid 1 milliGRAM(s) Oral daily  glucagon  Injectable 1 milliGRAM(s) IntraMuscular once  insulin glargine Injectable (LANTUS) 6 Unit(s) SubCutaneous at bedtime  insulin lispro (ADMELOG) corrective regimen sliding scale   SubCutaneous three times a day before meals  insulin lispro (ADMELOG) corrective regimen sliding scale   SubCutaneous at bedtime  levothyroxine 25 MICROGram(s) Oral daily  memantine 5 milliGRAM(s) Oral at bedtime  metoprolol tartrate 25 milliGRAM(s) Oral two times a day  midodrine 10 milliGRAM(s) Oral <User Schedule>  Nephro-celeste 1 Tablet(s) Oral daily  polyethylene glycol 3350 17 Gram(s) Oral two times a day  QUEtiapine 12.5 milliGRAM(s) Oral two times a day  senna Syrup 10 milliLiter(s) Oral at bedtime  trihexyphenidyl 2 milliGRAM(s) Oral three times a day    MEDICATIONS  (PRN):  acetaminophen     Tablet .. 650 milliGRAM(s) Oral every 6 hours PRN Mild Pain (1 - 3)  albuterol/ipratropium for Nebulization 3 milliLiter(s) Nebulizer every 6 hours PRN Shortness of Breath and/or Wheezing  guaiFENesin Oral Liquid (Sugar-Free) 200 milliGRAM(s) Oral every 6 hours PRN Cough  QUEtiapine 25 milliGRAM(s) Oral every 6 hours PRN Agitation  sodium chloride 0.9% lock flush 10 milliLiter(s) IV Push every 1 hour PRN Pre/post blood products, medications, blood draw, and to maintain line patency

## 2022-02-10 NOTE — BH CONSULTATION LIAISON PROGRESS NOTE - NSBHMSEAFFRANGE_PSY_A_CORE
Full/Blunted
Constricted
Constricted
Full/Blunted
Constricted

## 2022-02-10 NOTE — PROCEDURE NOTE - PROCEDURE FINDINGS AND DETAILS
Anastamosis intact without stenosis. Outflow stenosis centrally with numerous collaterals noted. Angioplasty with 12mm balloon. Post-angioplasty angiogram demonstrated decrease in stenosis with improvement in flow. Hemostasis with manual compression.    For full details please see pending report in PACS.
Right groin access through right common femoral artery. Severe atherosclerosis noted. No definite extravasation of contrast, however, empiric embolization of left L4 and L5 lumbar arteries was performed.
Successful conversion of nontunneled to tunneled dialysis catheter.
Existing right nontunneled central venous catheter prepped and draped. Exchanged over the wire for a new tunneled hemodialysis catheter. Catheter tip in SVC. Sterile dressing applied.  Next johnson catheter in gastrosromy tube site was removed over a wire and a new 18F gastrostomy tube was placed. Balloon inflated to 7cc.
Successful reinsertion of tunneled dialysis catheter through pre-existing tract.

## 2022-02-10 NOTE — BH CONSULTATION LIAISON PROGRESS NOTE - NSBHCHARTREVIEWINVESTIGATE_PSY_A_CORE FT
ecg< from: 12 Lead ECG (11.27.21 @ 08:07) >  Ventricular Rate 76 BPM  Atrial Rate 228 BPM  QRS Duration 92 ms  Q-T Interval 370 ms    QTC Calculation(Bazett) 416 ms    P Axis 253 degrees    R Axis 9 degrees    T Axis 116 degrees    Diagnosis Line ATRIAL FLUTTER WITH VARIABLE A-V BLOCK  LOW VOLTAGE QRS  SEPTAL INFARCT (CITED ON OR BEFORE 06-OCT-2019)  POSSIBLE INFERIOR INFARCT , AGE UNDETERMINED  ABNORMAL ECG  WHEN COMPARED WITH ECG OF 26-NOV-2021 12:25, (UNCONFIRMED)  NO SIGNIFICANT CHANGE WAS FOUND    
< from: 12 Lead ECG (11.23.21 @ 14:05) >      Ventricular Rate 86 BPM    Atrial Rate 250 BPM    QRS Duration 106 ms    Q-T Interval 382 ms    QTC Calculation(Bazett) 457 ms    P Axis 264 degrees    R Axis 55 degrees    T Axis 92 degrees    Diagnosis Line ATRIAL FLUTTER WITH VARIABLE A-V BLOCK  SEPTAL INFARCT (CITED ON OR BEFORE 06-OCT-2019)  ABNORMAL ECG  WHEN COMPARED WITH ECG OF 18-NOV-2021 09:14,  NO SIGNIFICANT CHANGE WAS FOUND  Confirmed by RAAD FERNANDEZ, ISIDRO ([id number]) on 11/24/2021 5:56:49 PM    < end of copied text >    
ecg< from: 12 Lead ECG (11.27.21 @ 08:07) >  Ventricular Rate 76 BPM  Atrial Rate 228 BPM  QRS Duration 92 ms  Q-T Interval 370 ms    QTC Calculation(Bazett) 416 ms    P Axis 253 degrees    R Axis 9 degrees    T Axis 116 degrees    Diagnosis Line ATRIAL FLUTTER WITH VARIABLE A-V BLOCK  LOW VOLTAGE QRS  SEPTAL INFARCT (CITED ON OR BEFORE 06-OCT-2019)  POSSIBLE INFERIOR INFARCT , AGE UNDETERMINED  ABNORMAL ECG  WHEN COMPARED WITH ECG OF 26-NOV-2021 12:25, (UNCONFIRMED)  NO SIGNIFICANT CHANGE WAS FOUND    
< from: 12 Lead ECG (11.18.21 @ 09:14) >      Ventricular Rate 82 BPM    Atrial Rate 234 BPM    QRS Duration 106 ms    Q-T Interval 460 ms    QTC Calculation(Bazett) 537 ms    R Axis 43 degrees    T Axis 109 degrees    Diagnosis Line ATRIAL FLUTTER WITH VARIABLE A-V BLOCK  LOW VOLTAGE QRS  SEPTAL INFARCT , AGE UNDETERMINED  PROLONGED QT  ABNORMAL ECG  WHEN COMPARED WITH ECG OF 02-NOV-2021 03:27,  NO SIGNIFICANT CHANGE WAS FOUND    Confirmed by MD TIM, EVELINA ([id number]) on 11/19/2021 6:04:26 PM    < end of copied text >

## 2022-02-10 NOTE — PROGRESS NOTE ADULT - ASSESSMENT
72yo M w/ PMHx of CAD (s/p CABG 2019), CKD (unknown stage), DM2, Parkinson's Disease, HTN, depression presents with new onset acute heart failure exacerbation, NSTEMI, started on hep gtt, developed bl RP bleed, acute anemia. sp MICU course for hemorrhagic shock, 10/28 sp IR embolization l4 and l5 lumbar artery. sp permacath and AVF.    # chronic systolic and diastolic heart failure  # ESRD, new HD  # Atrial flutter  # Parkinson's disease   # delirium  # CAD (coronary artery disease)  # HTN  # DM2 (diabetes mellitus, type 2)  # FTT sp PEG  midodrine during dialysis  sp L AVF using for HD, permacath planned for today  1/29 malfunctioning Lt AVF - 2/2 sp fistulogram and angioplasty by IR - nonfunctioning  sp shiley 2/4  insulin per endo  afib rvr as missed PO medication, cont lopressor, atorvastatin - can give by mouth now that no PEG, PEG not for dysphagia   2/6 pt pulled out peg tube  IR for PEG and permacath placement today  need to revisit the need for these procedures which are can be risky and dangerous in current clinical setting  will have goc discussed w family again  palliative reconsult      DVT ppx hsq    DNR/DNI    updated son HCP Branden over the phone 2/7  - confirmed family still wants all aggressive measures and to cont dialysis

## 2022-02-10 NOTE — PROGRESS NOTE ADULT - ASSESSMENT
ASSESSMENT:   71M w/ HTN, DM2, CAD-CABG, Parkinson's disease, and advanced CKD, 10/21/21 p/w LE swelling, c/b COVID; now newly ESRD-HD as of this admission    (1)Renal - ESRD-HD TTS - HD tomorrow  (2)Hyponatremia - higher Na+ bath with dialysis;   (3)Anemia- hgb lower    RECOMMEND  (1)HD tomorrow am; Please do not use AVF(maturing), use perm cath  (2)Planned for tunneled dialysis catheter and PEG tube replacement today with IR  (3)Nutritional support once peg replaced    D/w Dr. Rodriguez Friedman, NP-C  [facility]

## 2022-02-10 NOTE — PRE PROCEDURE NOTE - GENERAL PROCEDURE NAME
nontunneled HD catheter placement
Abdominal Angiography with Possible Embolization
Conversion of non-tunnelled to tunnelled HD catheter and G-tube replacement
Tunneled hemodialysis central venous catheter placement
non tunneled central venous catheter for HD

## 2022-02-10 NOTE — BH CONSULTATION LIAISON PROGRESS NOTE - NSBHMSEMUSCLE_PSY_A_CORE
Normal muscle tone/strength
Unable to assess
Normal muscle tone/strength
Unable to assess
Unable to assess
Normal muscle tone/strength

## 2022-02-10 NOTE — PROCEDURE NOTE - NSINFORMCONSENT_GEN_A_CORE
Benefits, risks, and possible complications of procedure explained to patient/caregiver who verbalized understanding and gave written consent.
Benefits, risks, and possible complications of procedure explained to patient/caregiver who verbalized understanding and gave written consent.
Son/HCP Yunier Quintero/Benefits, risks, and possible complications of procedure explained to patient/caregiver who verbalized understanding and gave verbal consent.
Family consent/Benefits, risks, and possible complications of procedure explained to patient/caregiver who verbalized understanding and gave verbal consent.
from patient's HCP/Benefits, risks, and possible complications of procedure explained to patient/caregiver who verbalized understanding and gave verbal consent.
Benefits, risks, and possible complications of procedure explained to patient/caregiver who verbalized understanding and gave written consent.
Benefits, risks, and possible complications of procedure explained to patient/caregiver who verbalized understanding and gave written consent.

## 2022-02-10 NOTE — BH CONSULTATION LIAISON PROGRESS NOTE - NSBHMSETHTPROC_PSY_A_CORE
Disorganized/Illogical/Impaired reasoning
Disorganized/Illogical/Impaired reasoning
Disorganized
Disorganized/Illogical/Impaired reasoning
Disorganized/Illogical/Impaired reasoning
Disorganized/Illogical
Disorganized/Illogical/Impaired reasoning
Disorganized
Disorganized/Illogical/Impaired reasoning
Disorganized/Illogical/Impaired reasoning

## 2022-02-10 NOTE — BH CONSULTATION LIAISON PROGRESS NOTE - NSBHPSYCHOLCOGORIENT_PSY_A_CORE
Not fully oriented...

## 2022-02-11 NOTE — PROGRESS NOTE ADULT - SUBJECTIVE AND OBJECTIVE BOX
Chief complaint  Patient is a 71y old  Male who presents with a chief complaint of leg swelling (11 Feb 2022 12:36)   Review of systems  Patient in bed, looks comfortable, no hypoglycemic episodes.    Labs and Fingersticks  CAPILLARY BLOOD GLUCOSE      POCT Blood Glucose.: 142 mg/dL (11 Feb 2022 11:37)  POCT Blood Glucose.: 118 mg/dL (11 Feb 2022 08:06)  POCT Blood Glucose.: 112 mg/dL (11 Feb 2022 05:30)  POCT Blood Glucose.: 110 mg/dL (10 Feb 2022 23:34)  POCT Blood Glucose.: 109 mg/dL (10 Feb 2022 20:48)  POCT Blood Glucose.: 133 mg/dL (10 Feb 2022 17:24)      Anion Gap, Serum: 9 (02-11 @ 07:03)  Anion Gap, Serum: 8 (02-11 @ 05:37)  Anion Gap, Serum: 14 (02-10 @ 06:55)  Anion Gap, Serum: 13 (02-10 @ 05:19)      Calcium, Total Serum: 9.0 (02-11 @ 07:03)  Calcium, Total Serum: 4.5 *LL* (02-11 @ 05:37)  Calcium, Total Serum: 9.8 (02-10 @ 06:55)  Calcium, Total Serum: 7.9 *L* (02-10 @ 05:19)  Albumin, Serum: 0.9 *L* (02-11 @ 05:37)    Alanine Aminotransferase (ALT/SGPT): <5 *L* (02-11 @ 05:37)  Alkaline Phosphatase, Serum: 36 *L* (02-11 @ 05:37)  Aspartate Aminotransferase (AST/SGOT): 20 (02-11 @ 05:37)        02-11    136  |  102  |  67<H>  ----------------------------<  115<H>  4.2   |  25  |  4.04<H>    Ca    9.0      11 Feb 2022 07:03    TPro  3.5<L>  /  Alb  0.9<L>  /  TBili  0.2  /  DBili  x   /  AST  20  /  ALT  <5<L>  /  AlkPhos  36<L>  02-11                        8.5    9.31  )-----------( 334      ( 11 Feb 2022 07:04 )             28.5     Medications  MEDICATIONS  (STANDING):  atorvastatin 80 milliGRAM(s) Oral at bedtime  carbidopa/levodopa  25/100 2.5 Tablet(s) Oral <User Schedule>  carbidopa/levodopa  25/100 2 Tablet(s) Oral <User Schedule>  chlorhexidine 4% Liquid 1 Application(s) Topical <User Schedule>  cinacalcet 60 milliGRAM(s) Oral daily  dextrose 40% Gel 15 Gram(s) Oral once  dextrose 5%. 1000 milliLiter(s) (50 mL/Hr) IV Continuous <Continuous>  dextrose 5%. 1000 milliLiter(s) (100 mL/Hr) IV Continuous <Continuous>  dextrose 5%. 1000 milliLiter(s) (40 mL/Hr) IV Continuous <Continuous>  dextrose 50% Injectable 25 Gram(s) IV Push once  dextrose 50% Injectable 12.5 Gram(s) IV Push once  dextrose 50% Injectable 25 Gram(s) IV Push once  dextrose 50% Injectable 12.5 Gram(s) IV Push once  dextrose 50% Injectable 25 Gram(s) IV Push once  diVALproex Sprinkle 250 milliGRAM(s) Oral three times a day  DULoxetine 20 milliGRAM(s) Oral daily  epoetin mari-epbx (RETACRIT) Injectable 42361 Unit(s) IV Push once  folic acid 1 milliGRAM(s) Oral daily  glucagon  Injectable 1 milliGRAM(s) IntraMuscular once  insulin lispro (ADMELOG) corrective regimen sliding scale   SubCutaneous every 6 hours  levothyroxine Injectable 12.5 MICROGram(s) IV Push <User Schedule>  memantine 5 milliGRAM(s) Oral at bedtime  metoprolol tartrate Injectable 5 milliGRAM(s) IV Push every 6 hours  midodrine 10 milliGRAM(s) Oral <User Schedule>  mirtazapine 7.5 milliGRAM(s) Oral daily  Nephro-celeste 1 Tablet(s) Oral daily  pantoprazole  Injectable 40 milliGRAM(s) IV Push two times a day  QUEtiapine 25 milliGRAM(s) Oral at bedtime  tamsulosin 0.4 milliGRAM(s) Oral at bedtime  trihexyphenidyl 2 milliGRAM(s) Oral three times a day      Physical Exam  General: Patient comfortable in bed  Vital Signs Last 12 Hrs  T(F): 98.4 (02-11-22 @ 12:15), Max: 98.4 (02-11-22 @ 12:15)  HR: 128 (02-11-22 @ 12:15) (116 - 128)  BP: 108/68 (02-11-22 @ 12:15) (108/68 - 126/77)  BP(mean): --  RR: 18 (02-11-22 @ 12:15) (18 - 18)  SpO2: 94% (02-11-22 @ 12:15) (94% - 100%)  Neck: No palpable thyroid nodules.  CVS: S1S2, No murmurs  Respiratory: No wheezing, no crepitations  GI: Abdomen soft, bowel sounds positive  Musculoskeletal:  edema lower extremities.   Skin: No skin rashes, no ecchymosis    Diagnostics             Chief complaint  Patient is a 71y old  Male who presents with a chief complaint of leg swelling (11 Feb 2022 12:36)   Review of systems  Patient in bed, looks comfortable, no hypoglycemic episodes.    Labs and Fingersticks  CAPILLARY BLOOD GLUCOSE      POCT Blood Glucose.: 142 mg/dL (11 Feb 2022 11:37)  POCT Blood Glucose.: 118 mg/dL (11 Feb 2022 08:06)  POCT Blood Glucose.: 112 mg/dL (11 Feb 2022 05:30)  POCT Blood Glucose.: 110 mg/dL (10 Feb 2022 23:34)  POCT Blood Glucose.: 109 mg/dL (10 Feb 2022 20:48)  POCT Blood Glucose.: 133 mg/dL (10 Feb 2022 17:24)      Anion Gap, Serum: 9 (02-11 @ 07:03)  Anion Gap, Serum: 8 (02-11 @ 05:37)  Anion Gap, Serum: 14 (02-10 @ 06:55)  Anion Gap, Serum: 13 (02-10 @ 05:19)      Calcium, Total Serum: 9.0 (02-11 @ 07:03)  Calcium, Total Serum: 4.5 *LL* (02-11 @ 05:37)  Calcium, Total Serum: 9.8 (02-10 @ 06:55)  Calcium, Total Serum: 7.9 *L* (02-10 @ 05:19)  Albumin, Serum: 0.9 *L* (02-11 @ 05:37)    Alanine Aminotransferase (ALT/SGPT): <5 *L* (02-11 @ 05:37)  Alkaline Phosphatase, Serum: 36 *L* (02-11 @ 05:37)  Aspartate Aminotransferase (AST/SGOT): 20 (02-11 @ 05:37)        02-11    136  |  102  |  67<H>  ----------------------------<  115<H>  4.2   |  25  |  4.04<H>    Ca    9.0      11 Feb 2022 07:03    TPro  3.5<L>  /  Alb  0.9<L>  /  TBili  0.2  /  DBili  x   /  AST  20  /  ALT  <5<L>  /  AlkPhos  36<L>  02-11                        8.5    9.31  )-----------( 334      ( 11 Feb 2022 07:04 )             28.5     Medications  MEDICATIONS  (STANDING):  atorvastatin 80 milliGRAM(s) Oral at bedtime  carbidopa/levodopa  25/100 2.5 Tablet(s) Oral <User Schedule>  carbidopa/levodopa  25/100 2 Tablet(s) Oral <User Schedule>  chlorhexidine 4% Liquid 1 Application(s) Topical <User Schedule>  cinacalcet 60 milliGRAM(s) Oral daily  dextrose 40% Gel 15 Gram(s) Oral once  dextrose 5%. 1000 milliLiter(s) (50 mL/Hr) IV Continuous <Continuous>  dextrose 5%. 1000 milliLiter(s) (100 mL/Hr) IV Continuous <Continuous>  dextrose 5%. 1000 milliLiter(s) (40 mL/Hr) IV Continuous <Continuous>  dextrose 50% Injectable 25 Gram(s) IV Push once  dextrose 50% Injectable 12.5 Gram(s) IV Push once  dextrose 50% Injectable 25 Gram(s) IV Push once  dextrose 50% Injectable 12.5 Gram(s) IV Push once  dextrose 50% Injectable 25 Gram(s) IV Push once  diVALproex Sprinkle 250 milliGRAM(s) Oral three times a day  DULoxetine 20 milliGRAM(s) Oral daily  epoetin mari-epbx (RETACRIT) Injectable 52769 Unit(s) IV Push once  folic acid 1 milliGRAM(s) Oral daily  glucagon  Injectable 1 milliGRAM(s) IntraMuscular once  insulin lispro (ADMELOG) corrective regimen sliding scale   SubCutaneous every 6 hours  levothyroxine Injectable 12.5 MICROGram(s) IV Push <User Schedule>  memantine 5 milliGRAM(s) Oral at bedtime  metoprolol tartrate Injectable 5 milliGRAM(s) IV Push every 6 hours  midodrine 10 milliGRAM(s) Oral <User Schedule>  mirtazapine 7.5 milliGRAM(s) Oral daily  Nephro-celeste 1 Tablet(s) Oral daily  pantoprazole  Injectable 40 milliGRAM(s) IV Push two times a day  QUEtiapine 25 milliGRAM(s) Oral at bedtime  tamsulosin 0.4 milliGRAM(s) Oral at bedtime  trihexyphenidyl 2 milliGRAM(s) Oral three times a day      Physical Exam  General: Patient comfortable in bed  Vital Signs Last 12 Hrs  T(F): 98.4 (02-11-22 @ 12:15), Max: 98.4 (02-11-22 @ 12:15)  HR: 128 (02-11-22 @ 12:15) (116 - 128)  BP: 108/68 (02-11-22 @ 12:15) (108/68 - 126/77)  BP(mean): --  RR: 18 (02-11-22 @ 12:15) (18 - 18)  SpO2: 94% (02-11-22 @ 12:15) (94% - 100%)  Neck: No palpable thyroid nodules.  CVS: S1S2, No murmurs  Respiratory: No wheezing, no crepitations  GI: Abdomen soft, bowel sounds positive  Musculoskeletal:  edema lower extremities.   Skin: No skin rashes, no ecchymosis    Diagnostics

## 2022-02-11 NOTE — PROGRESS NOTE ADULT - PROBLEM SELECTOR PLAN 1
Will continue coverage scale, monitoring FS and FU.  Suggest DC on Tradjenta 5mg daily, discontinue prior hypoglycemic agents/insulin, endo FU 4 weeks.

## 2022-02-11 NOTE — PROGRESS NOTE ADULT - ATTENDING COMMENTS
pt remains on mittens    psych reconsult noted-  returning to the dose of the Seroquel  to 50mg qhs and prns of Seroquel 12.5mg q6hrs prn agitation.  Depakote 250mg TID monitor PLT/LFTPRN: Depacon 125mg IV q8h PRN agitation, Ativan 0.25mg PO q8h PRN SEVERE agitation only     sp permacath placement 2/10  son wish all aggressive measures  palliative reconsult appreciated    [facility]

## 2022-02-11 NOTE — PROGRESS NOTE ADULT - SUBJECTIVE AND OBJECTIVE BOX
NEPHROLOGY-[facility] [phone]        Patient seen and examined in bed.  He was the same         MEDICATIONS  (STANDING):  atorvastatin 80 milliGRAM(s) Oral at bedtime  carbidopa/levodopa  25/100 2.5 Tablet(s) Oral <User Schedule>  carbidopa/levodopa  25/100 2 Tablet(s) Oral <User Schedule>  chlorhexidine 4% Liquid 1 Application(s) Topical <User Schedule>  cinacalcet 60 milliGRAM(s) Oral daily  dextrose 40% Gel 15 Gram(s) Oral once  dextrose 5%. 1000 milliLiter(s) (50 mL/Hr) IV Continuous <Continuous>  dextrose 5%. 1000 milliLiter(s) (100 mL/Hr) IV Continuous <Continuous>  dextrose 5%. 1000 milliLiter(s) (40 mL/Hr) IV Continuous <Continuous>  dextrose 50% Injectable 25 Gram(s) IV Push once  dextrose 50% Injectable 12.5 Gram(s) IV Push once  dextrose 50% Injectable 25 Gram(s) IV Push once  dextrose 50% Injectable 12.5 Gram(s) IV Push once  dextrose 50% Injectable 25 Gram(s) IV Push once  diVALproex Sprinkle 250 milliGRAM(s) Oral three times a day  DULoxetine 20 milliGRAM(s) Oral daily  epoetin mari-epbx (RETACRIT) Injectable 65683 Unit(s) IV Push once  folic acid 1 milliGRAM(s) Oral daily  glucagon  Injectable 1 milliGRAM(s) IntraMuscular once  insulin lispro (ADMELOG) corrective regimen sliding scale   SubCutaneous every 6 hours  levothyroxine Injectable 12.5 MICROGram(s) IV Push <User Schedule>  memantine 5 milliGRAM(s) Oral at bedtime  metoprolol tartrate Injectable 5 milliGRAM(s) IV Push every 6 hours  midodrine 10 milliGRAM(s) Oral <User Schedule>  mirtazapine 7.5 milliGRAM(s) Oral daily  Nephro-celeste 1 Tablet(s) Oral daily  pantoprazole  Injectable 40 milliGRAM(s) IV Push two times a day  QUEtiapine 25 milliGRAM(s) Oral at bedtime  tamsulosin 0.4 milliGRAM(s) Oral at bedtime  trihexyphenidyl 2 milliGRAM(s) Oral three times a day      VITAL:  T(C): , Max: 36.7 (02-11-22 @ 04:18)  T(F): , Max: 98.1 (02-11-22 @ 04:18)  HR: 116 (02-11-22 @ 06:00)  BP: 125/76 (02-11-22 @ 06:00)  BP(mean): 103 (02-10-22 @ 21:00)  RR: 18 (02-11-22 @ 04:18)  SpO2: 100% (02-11-22 @ 04:18)  Wt(kg): --    I and O's:    02-10 @ 07:01  -  02-11 @ 07:00  --------------------------------------------------------  IN: 0 mL / OUT: 325 mL / NET: -325 mL      Height (cm): 180.3 (02-10 @ 19:12)  Weight (kg): 96.4 (02-10 @ 19:12)  BMI (kg/m2): 29.7 (02-10 @ 19:12)  BSA (m2): 2.16 (02-10 @ 19:12)    PHYSICAL EXAM:    Constitutional: NAD  Neck:  No JVD  Respiratory: CTAB/L  Cardiovascular: S1 and S2  Gastrointestinal: BS+, soft,  + peg   Extremities: No peripheral edema  Neurological:  no focal deficits  Psychiatric: Normal mood, normal affect  : No Javier  Skin: No rashes  Access: avf n perm    LABS:                        8.5    9.31  )-----------( 334      ( 11 Feb 2022 07:04 )             28.5     02-11    136  |  102  |  67<H>  ----------------------------<  115<H>  4.2   |  25  |  4.04<H>    Ca    9.0      11 Feb 2022 07:03    TPro  3.5<L>  /  Alb  0.9<L>  /  TBili  0.2  /  DBili  x   /  AST  20  /  ALT  <5<L>  /  AlkPhos  36<L>  02-11          Urine Studies:          RADIOLOGY & ADDITIONAL STUDIES:

## 2022-02-11 NOTE — PROGRESS NOTE ADULT - SUBJECTIVE AND OBJECTIVE BOX
INTERVAL HPI/OVERNIGHT EVENTS:    pt seen and examined  s/p replacement of PEG with IR  tolerating bolus feeds    MEDICATIONS  (STANDING):  atorvastatin 80 milliGRAM(s) Oral at bedtime  carbidopa/levodopa  25/100 2.5 Tablet(s) Oral <User Schedule>  carbidopa/levodopa  25/100 2 Tablet(s) Oral <User Schedule>  chlorhexidine 4% Liquid 1 Application(s) Topical <User Schedule>  cinacalcet 60 milliGRAM(s) Oral daily  dextrose 40% Gel 15 Gram(s) Oral once  dextrose 5%. 1000 milliLiter(s) (50 mL/Hr) IV Continuous <Continuous>  dextrose 5%. 1000 milliLiter(s) (100 mL/Hr) IV Continuous <Continuous>  dextrose 5%. 1000 milliLiter(s) (40 mL/Hr) IV Continuous <Continuous>  dextrose 50% Injectable 25 Gram(s) IV Push once  dextrose 50% Injectable 12.5 Gram(s) IV Push once  dextrose 50% Injectable 25 Gram(s) IV Push once  dextrose 50% Injectable 12.5 Gram(s) IV Push once  dextrose 50% Injectable 25 Gram(s) IV Push once  diVALproex Sprinkle 250 milliGRAM(s) Oral three times a day  DULoxetine 20 milliGRAM(s) Oral daily  epoetin mari-epbx (RETACRIT) Injectable 17096 Unit(s) IV Push once  folic acid 1 milliGRAM(s) Oral daily  glucagon  Injectable 1 milliGRAM(s) IntraMuscular once  insulin lispro (ADMELOG) corrective regimen sliding scale   SubCutaneous every 6 hours  levothyroxine Injectable 12.5 MICROGram(s) IV Push <User Schedule>  memantine 5 milliGRAM(s) Oral at bedtime  metoprolol tartrate Injectable 5 milliGRAM(s) IV Push every 6 hours  midodrine 10 milliGRAM(s) Oral <User Schedule>  mirtazapine 7.5 milliGRAM(s) Oral daily  Nephro-celeste 1 Tablet(s) Oral daily  pantoprazole  Injectable 40 milliGRAM(s) IV Push two times a day  QUEtiapine 25 milliGRAM(s) Oral at bedtime  tamsulosin 0.4 milliGRAM(s) Oral at bedtime  trihexyphenidyl 2 milliGRAM(s) Oral three times a day    MEDICATIONS  (PRN):  acetaminophen     Tablet .. 650 milliGRAM(s) Oral every 6 hours PRN Mild Pain (1 - 3)  albuterol/ipratropium for Nebulization 3 milliLiter(s) Nebulizer every 6 hours PRN Shortness of Breath and/or Wheezing  diVALproex Sprinkle 125 milliGRAM(s) Oral every 8 hours PRN Agitation  guaiFENesin Oral Liquid (Sugar-Free) 200 milliGRAM(s) Oral every 6 hours PRN Cough  QUEtiapine 12.5 milliGRAM(s) Oral every 6 hours PRN agitation  sodium chloride 0.9% lock flush 10 milliLiter(s) IV Push every 1 hour PRN Pre/post blood products, medications, blood draw, and to maintain line patency      Allergies    adhesives (Rash)  latex (Urticaria)  No Known Drug Allergies    Intolerances      Review of Systems:  unable to obtain         Vital Signs Last 24 Hrs  T(C): 36.9 (11 Feb 2022 12:15), Max: 36.9 (11 Feb 2022 12:15)  T(F): 98.4 (11 Feb 2022 12:15), Max: 98.4 (11 Feb 2022 12:15)  HR: 128 (11 Feb 2022 12:15) (116 - 128)  BP: 108/68 (11 Feb 2022 12:15) (108/68 - 141/86)  BP(mean): 103 (10 Feb 2022 21:00) (92 - 104)  RR: 18 (11 Feb 2022 12:15) (16 - 20)  SpO2: 94% (11 Feb 2022 12:15) (94% - 100%)    PHYSICAL EXAM:     GENERAL:  Appears stated age, well-groomed, well-nourished, no distress  HEENT:  NC/AT,  conjunctivae anicteric, clear and pink,   NECK: supple, trachea midline  CHEST:  Full & symmetric excursion, no increased effort, breath sounds clear  HEART:  Regular rhythm, no JVD  ABDOMEN:  Soft, non-tender, non-distended, normoactive bowel sounds,  no masses , no hepatosplenomegaly  EXTREMITIES:  no cyanosis,clubbing or edema  SKIN:  No rash, erythema, or, ecchymoses, no jaundice  NEURO:  non-focal, no asterixis  RECTAL: Deferred      LABS:                        8.5    9.31  )-----------( 334      ( 11 Feb 2022 07:04 )             28.5     02-11    136  |  102  |  67<H>  ----------------------------<  115<H>  4.2   |  25  |  4.04<H>    Ca    9.0      11 Feb 2022 07:03    TPro  3.5<L>  /  Alb  0.9<L>  /  TBili  0.2  /  DBili  x   /  AST  20  /  ALT  <5<L>  /  AlkPhos  36<L>  02-11    PT/INR - ( 10 Feb 2022 05:21 )   PT: 14.1 sec;   INR: 1.18 ratio               RADIOLOGY & ADDITIONAL TESTS:

## 2022-02-11 NOTE — PROGRESS NOTE ADULT - SUBJECTIVE AND OBJECTIVE BOX
SUBJECTIVE / OVERNIGHT EVENTS:    s/p peg and permacath yesterday  patient seen and examined    --------------------------------------------------------------------------------------------  LABS:                        8.5    9.31  )-----------( 334      ( 11 Feb 2022 07:04 )             28.5     02-11    136  |  102  |  67<H>  ----------------------------<  115<H>  4.2   |  25  |  4.04<H>    Ca    9.0      11 Feb 2022 07:03    TPro  3.5<L>  /  Alb  0.9<L>  /  TBili  0.2  /  DBili  x   /  AST  20  /  ALT  <5<L>  /  AlkPhos  36<L>  02-11    PT/INR - ( 10 Feb 2022 05:21 )   PT: 14.1 sec;   INR: 1.18 ratio           CAPILLARY BLOOD GLUCOSE      POCT Blood Glucose.: 118 mg/dL (11 Feb 2022 08:06)  POCT Blood Glucose.: 112 mg/dL (11 Feb 2022 05:30)  POCT Blood Glucose.: 110 mg/dL (10 Feb 2022 23:34)  POCT Blood Glucose.: 109 mg/dL (10 Feb 2022 20:48)  POCT Blood Glucose.: 133 mg/dL (10 Feb 2022 17:24)  POCT Blood Glucose.: 120 mg/dL (10 Feb 2022 11:39)            RADIOLOGY & ADDITIONAL TESTS:    Imaging Personally Reviewed:  [x] YES  [ ] NO    Consultant(s) Notes Reviewed:  [x] YES  [ ] NO    MEDICATIONS  (STANDING):  atorvastatin 80 milliGRAM(s) Oral at bedtime  carbidopa/levodopa  25/100 2.5 Tablet(s) Oral <User Schedule>  carbidopa/levodopa  25/100 2 Tablet(s) Oral <User Schedule>  chlorhexidine 4% Liquid 1 Application(s) Topical <User Schedule>  cinacalcet 60 milliGRAM(s) Oral daily  dextrose 40% Gel 15 Gram(s) Oral once  dextrose 5%. 1000 milliLiter(s) (50 mL/Hr) IV Continuous <Continuous>  dextrose 5%. 1000 milliLiter(s) (100 mL/Hr) IV Continuous <Continuous>  dextrose 5%. 1000 milliLiter(s) (40 mL/Hr) IV Continuous <Continuous>  dextrose 50% Injectable 25 Gram(s) IV Push once  dextrose 50% Injectable 12.5 Gram(s) IV Push once  dextrose 50% Injectable 25 Gram(s) IV Push once  dextrose 50% Injectable 12.5 Gram(s) IV Push once  dextrose 50% Injectable 25 Gram(s) IV Push once  diVALproex Sprinkle 250 milliGRAM(s) Oral three times a day  DULoxetine 20 milliGRAM(s) Oral daily  epoetin mari-epbx (RETACRIT) Injectable 38224 Unit(s) IV Push once  folic acid 1 milliGRAM(s) Oral daily  glucagon  Injectable 1 milliGRAM(s) IntraMuscular once  insulin lispro (ADMELOG) corrective regimen sliding scale   SubCutaneous every 6 hours  levothyroxine Injectable 12.5 MICROGram(s) IV Push <User Schedule>  memantine 5 milliGRAM(s) Oral at bedtime  metoprolol tartrate Injectable 5 milliGRAM(s) IV Push every 6 hours  midodrine 10 milliGRAM(s) Oral <User Schedule>  mirtazapine 7.5 milliGRAM(s) Oral daily  Nephro-celeste 1 Tablet(s) Oral daily  pantoprazole  Injectable 40 milliGRAM(s) IV Push two times a day  QUEtiapine 25 milliGRAM(s) Oral at bedtime  tamsulosin 0.4 milliGRAM(s) Oral at bedtime  trihexyphenidyl 2 milliGRAM(s) Oral three times a day    MEDICATIONS  (PRN):  acetaminophen     Tablet .. 650 milliGRAM(s) Oral every 6 hours PRN Mild Pain (1 - 3)  albuterol/ipratropium for Nebulization 3 milliLiter(s) Nebulizer every 6 hours PRN Shortness of Breath and/or Wheezing  diVALproex Sprinkle 125 milliGRAM(s) Oral every 8 hours PRN Agitation  guaiFENesin Oral Liquid (Sugar-Free) 200 milliGRAM(s) Oral every 6 hours PRN Cough  QUEtiapine 12.5 milliGRAM(s) Oral every 6 hours PRN agitation  sodium chloride 0.9% lock flush 10 milliLiter(s) IV Push every 1 hour PRN Pre/post blood products, medications, blood draw, and to maintain line patency      Care Discussed with Consultants/Other Providers [x] YES  [ ] NO    Vital Signs Last 24 Hrs  T(C): 36.7 (11 Feb 2022 04:18), Max: 36.7 (11 Feb 2022 04:18)  T(F): 98.1 (11 Feb 2022 04:18), Max: 98.1 (11 Feb 2022 04:18)  HR: 116 (11 Feb 2022 06:00) (116 - 125)  BP: 125/76 (11 Feb 2022 06:00) (116/76 - 160/97)  BP(mean): 103 (10 Feb 2022 21:00) (92 - 104)  RR: 18 (11 Feb 2022 04:18) (16 - 20)  SpO2: 100% (11 Feb 2022 04:18) (93% - 100%)  I&O's Summary    10 Feb 2022 07:01  -  11 Feb 2022 07:00  --------------------------------------------------------  IN: 0 mL / OUT: 325 mL / NET: -325 mL      PHYSICAL EXAM:  GENERAL: NAD,   EYES: conjunctiva and sclera clear  ENMT: Moist mucous membranes  NECK: Supple, No JVD, Normal thyroid  CHEST/LUNG: non labored, cta b/l  HEART: Regular rate and rhythm; No murmurs, rubs, or gallops  ABDOMEN: Soft, Nontender, Nondistended; Bowel sounds present, +peg  EXTREMITIES:  2+ Peripheral Pulses, No clubbing, cyanosis, or edema  LYMPH: No lymphadenopathy noted  SKIN: No rashes or lesions  VASCULAR ACCESS: AVF and Permacath

## 2022-02-11 NOTE — PROGRESS NOTE ADULT - ASSESSMENT
70yo M w/ PMHx of CAD (s/p CABG 2019), CKD (unknown stage), DM2, Parkinson's Disease, HTN, depression presents with new onset acute heart failure exacerbation, NSTEMI, started on hep gtt, developed bl RP bleed, acute anemia. sp MICU course for hemorrhagic shock, 10/28 sp IR embolization l4 and l5 lumbar artery. sp permacath and AVF.    # chronic systolic and diastolic heart failure  # ESRD, new HD  # Atrial flutter  # Parkinson's disease   # delirium  # CAD (coronary artery disease)  # HTN  # DM2 (diabetes mellitus, type 2)  # FTT sp PEG  midodrine during dialysis  sp L AVF using for HD, permacath 2/10  1/29 malfunctioning Lt AVF - 2/2 sp fistulogram and angioplasty by IR - nonfunctioning  sp shikris 2/4  insulin per endo  afib rvr as missed PO medication, cont lopressor, atorvastatin - can give by mouth now that no PEG, PEG not for dysphagia   2/6 pt pulled out peg tube  s/p IR for PEG and permacath placement  will have goc discussed w family again  palliative reconsult    DVT ppx hsq    DNR/DNI    updated son HCP Branden over the phone 2/7  - confirmed family still wants all aggressive measures and to cont dialysis

## 2022-02-11 NOTE — PROGRESS NOTE ADULT - ASSESSMENT
ASSESSMENT:   71M w/ HTN, DM2, CAD-CABG, Parkinson's disease, and advanced CKD, 10/21/21 p/w LE swelling, c/b COVID; now newly ESRD-HD as of this admission    (1)Renal - ESRD-HD TTS - HD tomorrow  (2)Anemia-   (3)Failure to thrive and confusion     RECOMMEND  (1)HD tomorrow am; Please do not use AVF(maturing), use perm cath  (2)SP perm and PEG tube replacement   (3)Nutritional support once peg replaced  (4)Another BAM next week if still here (on AVF)    DC planning     [facility]  ASSESSMENT:   71M w/ HTN, DM2, CAD-CABG, Parkinson's disease, and advanced CKD, 10/21/21 p/w LE swelling, c/b COVID; now newly ESRD-HD as of this admission    (1)Renal - ESRD-HD MWF - HD today   (2)Anemia-   (3)Failure to thrive and confusion     RECOMMEND  (1)HD today; Please do not use AVF(maturing), use perm cath  (2)SP perm and PEG tube replacement   (3)Nutritional support once peg replaced  (4)Another BAM next week if still here (on AVF)    DC planning     [facility]

## 2022-02-11 NOTE — PROGRESS NOTE ADULT - ASSESSMENT
71 M w volume overload, GI consulted for drop in hgb    1. CHF per cardio    2. ABBY on CKD per renal  -on HD via permacath per renal, midodrine during dialysis  -AVF not functional   -s/p tunnelled HD catheter insertion by IR 2/10    3. RP bleed  -s/p Abdominal Angiography and Embolization on 10/29/2021 by Interventional radiology of l4and l5 lumbar artery     4. Failure to thrive  -s/p PEG 1/10    -patient pulled out PEG  -IR replaced PEG 2/10  - tolerating feeds  - maintain mittens     5. stress duodenal ulcers  -PPI BID    6. Constipation  -resolved  -continue Miralax BID  -DC planning to rehab underway     [facility], [place]  Gastroenterology and Hepatology  [address] [place], [place]  Office: [phone]  Cell: [phone]

## 2022-02-11 NOTE — PROGRESS NOTE ADULT - ASSESSMENT
Assessment  DMT2: 71y Male with DM T2 with hyperglycemia, A1C 6.4%, was on insulin at home, now on insulin coverage only, blood sugars are stable and trending within acceptable range, no hypoglycemias, s/p peg replacement.  Hypothyroidism: Patient has no history thyroid disease, was not on any thyroid supplements, subclinical with low-normal FT4, lethargic, on synthroid 25 mcg po daily, repeat TFTs improved and euthyroid.  Hypercalcemia: Started on Sensipar 60mg daily, Ca improving.  CHF: on medications, stable, monitored.  HTN: Controlled,  on antihypertensive medications.  Parkinsons: on meds, monitored.  CKD: Monitor labs/BMP       Assessment  DMT2: 71y Male with DM T2 with hyperglycemia, A1C 6.4%, was on insulin at home, now on insulin coverage only, blood sugars are stable and trending within acceptable range,  no hypoglycemias, s/p peg replacement.  Hypothyroidism: Patient has no history thyroid disease, was not on any thyroid supplements, subclinical with low-normal FT4, lethargic, on synthroid 25 mcg po daily, repeat TFTs improved and euthyroid.  Hypercalcemia: Started on Sensipar 60mg daily, Ca improving.  CHF: on medications, stable, monitored.  HTN: Controlled,  on antihypertensive medications.  Parkinsons: on meds, monitored.  CKD: Monitor labs/BMP

## 2022-02-12 NOTE — PROGRESS NOTE ADULT - ASSESSMENT
71 M w volume overload, GI consulted for drop in hgb    1. CHF per cardio    2. ABBY on CKD per renal  -on HD via permacath per renal, midodrine during dialysis  -AVF not functional   -s/p tunnelled HD catheter insertion by IR 2/10    3. RP bleed  -s/p Abdominal Angiography and Embolization on 10/29/2021 by Interventional radiology of l4and l5 lumbar artery     4. Failure to thrive  -s/p PEG 1/10    -patient pulled out PEG  -IR replaced PEG 2/10  - tolerating feeds  - maintain mittens     5. stress duodenal ulcers  -PPI BID    6. Constipation  -resolved  -continue Miralax BID  -DC planning to rehab underway     [facility], [place]  Gastroenterology and Hepatology  [address] [place], [place]  Office: [phone]  Cell: [phone]

## 2022-02-12 NOTE — PROGRESS NOTE ADULT - SUBJECTIVE AND OBJECTIVE BOX
INTERVAL HPI/OVERNIGHT EVENTS:    pt seen and examined  s/p replacement of PEG with IR  tolerating bolus feeds    MEDICATIONS  (STANDING):  atorvastatin 80 milliGRAM(s) Oral at bedtime  carbidopa/levodopa  25/100 2.5 Tablet(s) Oral <User Schedule>  carbidopa/levodopa  25/100 2 Tablet(s) Oral <User Schedule>  chlorhexidine 4% Liquid 1 Application(s) Topical <User Schedule>  cinacalcet 60 milliGRAM(s) Oral daily  dextrose 40% Gel 15 Gram(s) Oral once  dextrose 5%. 1000 milliLiter(s) (50 mL/Hr) IV Continuous <Continuous>  dextrose 5%. 1000 milliLiter(s) (100 mL/Hr) IV Continuous <Continuous>  dextrose 5%. 1000 milliLiter(s) (40 mL/Hr) IV Continuous <Continuous>  dextrose 50% Injectable 25 Gram(s) IV Push once  dextrose 50% Injectable 12.5 Gram(s) IV Push once  dextrose 50% Injectable 25 Gram(s) IV Push once  dextrose 50% Injectable 12.5 Gram(s) IV Push once  dextrose 50% Injectable 25 Gram(s) IV Push once  diVALproex Sprinkle 250 milliGRAM(s) Oral three times a day  DULoxetine 20 milliGRAM(s) Oral daily  epoetin mari-epbx (RETACRIT) Injectable 11115 Unit(s) IV Push once  folic acid 1 milliGRAM(s) Oral daily  glucagon  Injectable 1 milliGRAM(s) IntraMuscular once  insulin lispro (ADMELOG) corrective regimen sliding scale   SubCutaneous every 6 hours  levothyroxine Injectable 12.5 MICROGram(s) IV Push <User Schedule>  memantine 5 milliGRAM(s) Oral at bedtime  metoprolol tartrate Injectable 5 milliGRAM(s) IV Push every 6 hours  midodrine 10 milliGRAM(s) Oral <User Schedule>  mirtazapine 7.5 milliGRAM(s) Oral daily  Nephro-celeste 1 Tablet(s) Oral daily  pantoprazole  Injectable 40 milliGRAM(s) IV Push two times a day  QUEtiapine 25 milliGRAM(s) Oral at bedtime  tamsulosin 0.4 milliGRAM(s) Oral at bedtime  trihexyphenidyl 2 milliGRAM(s) Oral three times a day    MEDICATIONS  (PRN):  acetaminophen     Tablet .. 650 milliGRAM(s) Oral every 6 hours PRN Mild Pain (1 - 3)  albuterol/ipratropium for Nebulization 3 milliLiter(s) Nebulizer every 6 hours PRN Shortness of Breath and/or Wheezing  diVALproex Sprinkle 125 milliGRAM(s) Oral every 8 hours PRN Agitation  guaiFENesin Oral Liquid (Sugar-Free) 200 milliGRAM(s) Oral every 6 hours PRN Cough  QUEtiapine 12.5 milliGRAM(s) Oral every 6 hours PRN agitation  sodium chloride 0.9% lock flush 10 milliLiter(s) IV Push every 1 hour PRN Pre/post blood products, medications, blood draw, and to maintain line patency      Allergies    adhesives (Rash)  latex (Urticaria)  No Known Drug Allergies    Intolerances      Review of Systems:  unable to obtain         Vital Signs Last 24 Hrs  T(C): 36.9 (11 Feb 2022 12:15), Max: 36.9 (11 Feb 2022 12:15)  T(F): 98.4 (11 Feb 2022 12:15), Max: 98.4 (11 Feb 2022 12:15)  HR: 128 (11 Feb 2022 12:15) (116 - 128)  BP: 108/68 (11 Feb 2022 12:15) (108/68 - 141/86)  BP(mean): 103 (10 Feb 2022 21:00) (92 - 104)  RR: 18 (11 Feb 2022 12:15) (16 - 20)  SpO2: 94% (11 Feb 2022 12:15) (94% - 100%)    PHYSICAL EXAM:     GENERAL:  Appears stated age, well-groomed, well-nourished, no distress  HEENT:  NC/AT,  conjunctivae anicteric, clear and pink,   NECK: supple, trachea midline  CHEST:  Full & symmetric excursion, no increased effort, breath sounds clear  HEART:  Regular rhythm, no JVD  ABDOMEN:  Soft, non-tender, non-distended, normoactive bowel sounds,  no masses , no hepatosplenomegaly  EXTREMITIES:  no cyanosis,clubbing or edema  SKIN:  No rash, erythema, or, ecchymoses, no jaundice  NEURO:  non-focal, no asterixis  RECTAL: Deferred      LABS:                        8.5    9.31  )-----------( 334      ( 11 Feb 2022 07:04 )             28.5     02-11    136  |  102  |  67<H>  ----------------------------<  115<H>  4.2   |  25  |  4.04<H>    Ca    9.0      11 Feb 2022 07:03    TPro  3.5<L>  /  Alb  0.9<L>  /  TBili  0.2  /  DBili  x   /  AST  20  /  ALT  <5<L>  /  AlkPhos  36<L>  02-11    PT/INR - ( 10 Feb 2022 05:21 )   PT: 14.1 sec;   INR: 1.18 ratio               RADIOLOGY & ADDITIONAL TESTS:

## 2022-02-12 NOTE — PROGRESS NOTE ADULT - SUBJECTIVE AND OBJECTIVE BOX
Patient is a 71y old  Male who presents with a chief complaint of leg swelling (11 Feb 2022 13:12)      INTERVAL HPI/OVERNIGHT EVENTS: noted  pt seen and examined this am   events noted  calm and cooperative, no new events      Vital Signs Last 24 Hrs  T(C): 36.4 (12 Feb 2022 10:50), Max: 36.9 (11 Feb 2022 12:15)  T(F): 97.6 (12 Feb 2022 10:50), Max: 98.4 (11 Feb 2022 12:15)  HR: 125 (12 Feb 2022 10:50) (78 - 128)  BP: 129/71 (12 Feb 2022 10:50) (95/53 - 129/71)  BP(mean): --  RR: 17 (12 Feb 2022 10:50) (17 - 19)  SpO2: 98% (12 Feb 2022 10:50) (93% - 99%)    acetaminophen     Tablet .. 650 milliGRAM(s) Oral every 6 hours PRN  albuterol/ipratropium for Nebulization 3 milliLiter(s) Nebulizer every 6 hours PRN  atorvastatin 80 milliGRAM(s) Oral at bedtime  carbidopa/levodopa  25/100 2.5 Tablet(s) Oral <User Schedule>  carbidopa/levodopa  25/100 2 Tablet(s) Oral <User Schedule>  chlorhexidine 4% Liquid 1 Application(s) Topical <User Schedule>  cinacalcet 60 milliGRAM(s) Oral daily  dextrose 40% Gel 15 Gram(s) Oral once  dextrose 5%. 1000 milliLiter(s) IV Continuous <Continuous>  dextrose 5%. 1000 milliLiter(s) IV Continuous <Continuous>  dextrose 5%. 1000 milliLiter(s) IV Continuous <Continuous>  dextrose 50% Injectable 25 Gram(s) IV Push once  dextrose 50% Injectable 12.5 Gram(s) IV Push once  dextrose 50% Injectable 25 Gram(s) IV Push once  dextrose 50% Injectable 12.5 Gram(s) IV Push once  dextrose 50% Injectable 25 Gram(s) IV Push once  diVALproex Sprinkle 125 milliGRAM(s) Oral every 8 hours PRN  diVALproex Sprinkle 250 milliGRAM(s) Oral three times a day  DULoxetine 20 milliGRAM(s) Oral daily  folic acid 1 milliGRAM(s) Oral daily  glucagon  Injectable 1 milliGRAM(s) IntraMuscular once  guaiFENesin Oral Liquid (Sugar-Free) 200 milliGRAM(s) Oral every 6 hours PRN  insulin lispro (ADMELOG) corrective regimen sliding scale   SubCutaneous every 6 hours  levothyroxine Injectable 12.5 MICROGram(s) IV Push <User Schedule>  memantine 5 milliGRAM(s) Oral at bedtime  metoprolol tartrate Injectable 5 milliGRAM(s) IV Push every 6 hours  midodrine 10 milliGRAM(s) Oral <User Schedule>  mirtazapine 7.5 milliGRAM(s) Oral daily  Nephro-celeste 1 Tablet(s) Oral daily  pantoprazole  Injectable 40 milliGRAM(s) IV Push two times a day  QUEtiapine 50 milliGRAM(s) Oral at bedtime  QUEtiapine 12.5 milliGRAM(s) Oral every 6 hours PRN  sodium chloride 0.9% lock flush 10 milliLiter(s) IV Push every 1 hour PRN  tamsulosin 0.4 milliGRAM(s) Oral at bedtime  trihexyphenidyl 2 milliGRAM(s) Oral three times a day      PHYSICAL EXAM:  GENERAL: NAD,   EYES: conjunctiva and sclera clear  ENMT: Moist mucous membranes  NECK: Supple, No JVD, Normal thyroid  CHEST/LUNG: non labored, cta b/l  HEART: Regular rate and rhythm; No murmurs, rubs, or gallops  ABDOMEN: Soft, Nontender, Nondistended; Bowel sounds present  EXTREMITIES:  2+ Peripheral Pulses, No clubbing, cyanosis, or edema  LYMPH: No lymphadenopathy noted  SKIN: No rashes or lesions    Consultant(s) Notes Reviewed:  [x ] YES  [ ] NO  Care Discussed with Consultants/Other Providers [ x] YES  [ ] NO    LABS:                        7.1    11.45 )-----------( 294      ( 12 Feb 2022 05:30 )             23.2     02-12    136  |  100  |  41<H>  ----------------------------<  174<H>  3.2<L>   |  25  |  2.40<H>    Ca    7.9<L>      12 Feb 2022 05:30    TPro  3.5<L>  /  Alb  0.9<L>  /  TBili  0.2  /  DBili  x   /  AST  20  /  ALT  <5<L>  /  AlkPhos  36<L>  02-11        CAPILLARY BLOOD GLUCOSE      POCT Blood Glucose.: 168 mg/dL (12 Feb 2022 05:35)  POCT Blood Glucose.: 126 mg/dL (11 Feb 2022 23:33)  POCT Blood Glucose.: 167 mg/dL (11 Feb 2022 17:02)              RADIOLOGY & ADDITIONAL TESTS:    Imaging Personally Reviewed:  [x ] YES  [ ] NO

## 2022-02-12 NOTE — PROGRESS NOTE ADULT - ASSESSMENT
Assessment  DMT2: 71y Male with DM T2 with hyperglycemia, A1C 6.4%, was on insulin at home, now on insulin coverage only, blood sugars are improving, no hypoglycemias, s/p peg replacement.  Hypothyroidism: Patient has no history thyroid disease, was not on any thyroid supplements, subclinical with low-normal FT4, lethargic, on synthroid 25 mcg po daily, repeat TFTs improved and euthyroid.  Hypercalcemia: Started on Sensipar 60mg daily, Ca improving.  CHF: on medications, stable, monitored.  HTN: Controlled,  on antihypertensive medications.  Parkinsons: on meds, monitored.  CKD: Monitor labs/BMP

## 2022-02-12 NOTE — PROGRESS NOTE ADULT - ASSESSMENT
70yo M w/ PMHx of CAD (s/p CABG 2019), CKD (unknown stage), DM2, Parkinson's Disease, HTN, depression presents with new onset acute heart failure exacerbation, NSTEMI, started on hep gtt, developed bl RP bleed, acute anemia. sp MICU course for hemorrhagic shock, 10/28 sp IR embolization l4 and l5 lumbar artery. sp permacath and AVF.    # chronic systolic and diastolic heart failure  # ESRD, new HD  # Atrial flutter  # Parkinson's disease   # delirium  # CAD (coronary artery disease)  # HTN  # DM2 (diabetes mellitus, type 2)  # FTT sp PEG  midodrine during dialysis  sp L AVF using for HD,   1/29 malfunctioning Lt AVF - 2/2 sp fistulogram and angioplasty by IR - nonfunctioning  sp shiley 2/4, sp permacath 2/10  afib rvr as missed PO medication, cont lopressor, atorvastatin - can give by mouth now that no PEG, PEG not for dysphagia     2/6 pt pulled out peg tube  pt remains on mittens  psych reconsulted-returning to the dose of the Seroquel  to 50mg qhs and prns of Seroquel 12.5mg q6hrs prn agitation.  Depakote 250mg TID monitor PLT/LFTPRN: Depacon 125mg IV q8h PRN agitation, Ativan 0.25mg PO q8h PRN SEVERE agitation only     sp permacath and PEG placement 2/10  son wish all aggressive measures  palliative reconsult appreciated    [facility]  [phone]      DVT ppx hsq    DNR/DNI    [facility]  [phone]

## 2022-02-12 NOTE — CHART NOTE - NSCHARTNOTEFT_GEN_A_CORE
Nutrition Follow Up Note  Patient seen for: Consult for Tube feeding    Chart reviewed, events noted.  71M w/ HTN, DM2, CAD-CABG, Parkinson's disease, and advanced CKD, 10/21/21 p/w LE swelling, c/b COVID; now newly ESRD-HD as of this admission.  S/p tunneled catheter 2/10. Pt with h/o Peg feeding, pulled out  PEG, S/P PEG replacement with IR,     Enteral nutrition feeds resumed with loose stools per RN x2 today.          Source: [X ] NP       [x] EMR        [ X] RN        [ ] Family at bedside       [ ] Other:    -If unable to interview patient: [ ] Trach/Vent/BiPAP  [ ] Disoriented/confused/inappropriate to interview    Diet Order:   Diet, NPO with Tube Feed:   Tube Feeding Modality: Gastrostomy  Nepro with Carb Steady (NEPRORTH)  Total Volume for 24 Hours (mL): 1980  Bolus  Total Volume of Bolus (mL):  330  Total # of Feeds: 4  Tube Feed Frequency: Every 4 hours   Tube Feed Start Time: 16:00  Bolus Feed Rate (mL per Hour): 330   Bolus Feed Duration (in Hours): 1  Bolus Feed Instructions:  Nepro start @ 120 ml bolus, increase 120 ml per bolus as tolerated to GOAL bolus 330 ml 4x daily (08:00, 12:00, 16:00, 20:00).  Free Water Flush Instructions:  defer to team (22)    - Is current order appropriate/adequate? [ ] Yes  [ ]  No:     - PO intake :   [ ] >75%  Adequate    [ ] 50-75%  Fair        [ ] <50%  Poor  - Nutrition-Related Concerns:    - Micronutrient Supplementation: dextrose 5%. 1000 milliLiter(s) IV Continuous <Continuous>  dextrose 5%. 1000 milliLiter(s) IV Continuous <Continuous>  dextrose 5%. 1000 milliLiter(s) IV Continuous <Continuous>  folic acid 1 milliGRAM(s) Oral daily  Nephro-celeste 1 Tablet(s) Oral daily  sodium chloride 0.9% lock flush 10 milliLiter(s) IV Push every 1 hour PRN    GI:  Last BM ___.   Bowel Regimen? [ ] Yes   [ ] No    Weights:   Daily Weight in k.7 (), Weight in k (), Weight in k.6 (02-10)    Pertinent Labs:  @ 13:37: Na 135, BUN 57<H>, Cr 2.47<H>, <H>, K+ 3.9, Phos --, Mg --, Alk Phos --, ALT/SGPT --, AST/SGOT --, HbA1c --   @ 05:30: Na 136, BUN 41<H>, Cr 2.40<H>, <H>, K+ 3.2<L>, Phos --, Mg --, Alk Phos --, ALT/SGPT --, AST/SGOT --, HbA1c --    A1C with Estimated Average Glucose Result: 5.8 % (22 @ 09:27)  A1C with Estimated Average Glucose Result: 6.4 % (10-22-21 @ 08:56)    Finger Sticks:  POCT Blood Glucose.: 237 mg/dL ( @ 17:23)  POCT Blood Glucose.: 197 mg/dL ( @ 11:45)  POCT Blood Glucose.: 168 mg/dL ( @ 05:35)  POCT Blood Glucose.: 126 mg/dL ( @ 23:33)      Skin per nursing documentation:   Edema:     Estimated Needs:   [ ] no change since previous assessment  [ ] recalculated:     Previous Nutrition Diagnosis:   Nutrition Diagnosis is: [ ] ongoing  [ ] resolved [ ] not applicable     New Nutrition Diagnosis: [ ] Not applicable    Nutrition Care Plan:  [ ] In Progress  [ ] Achieved  [ ] Not applicable    Nutrition Interventions / Recommendations:    1) Education Provided:     [ ] Yes:                                                      [ ] Not applicable    2) Continue Current Diet: [ ] Yes                                                  [ ] No, change to:     3) Oral Nutrition Supplementation:     4) Micronutrient Supplementation:    5) Other Considerations:    Monitoring and Evaluation:   Continue to monitor Nutritional intake, Tolerance to diet prescription, weights, labs, skin integrity    Jada Hou RD, CDN  Pager [phone]  RD remains available upon request and will follow up per protocol Nutrition Follow Up Note  Patient seen for: Consult for Tube feeding    Chart reviewed, events noted.  71M w/ HTN, DM2, CAD-CABG, Parkinson's disease, and advanced CKD, 10/21/21 p/w LE swelling, c/b COVID; now newly ESRD-HD as of this admission.  S/p tunneled catheter 2/10. Pt with h/o Peg feeding, patient pulled out  PEG.     S/P Peg replacement yesterday. Enteral nutrition feeds resumed with loose stools per RN x2 today.    Consult placed for tube feeding related to loose stools. Per NP, RN states stools are watery with odor initiating a stool collection for c.diff.    Source: [X ] NP       [x] EMR        [ X] RN              Diet Order:   Diet, NPO with Tube Feed:   Tube Feeding Modality: Gastrostomy  Nepro with Carb Steady (NEPRORTH)  Total Volume for 24 Hours (mL): 1980  Bolus  Total Volume of Bolus (mL):  330  Total # of Feeds: 4  Tube Feed Frequency: Every 4 hours   Tube Feed Start Time: 16:00  Bolus Feed Rate (mL per Hour): 330   Bolus Feed Duration (in Hours): 1  Bolus Feed Instructions:  Nepro start @ 120 ml bolus, increase 120 ml per bolus as tolerated to GOAL bolus 330 ml 4x daily (08:00, 12:00, 16:00, 20:00).  Free Water Flush Instructions:  defer to team (22)    - Is current order appropriate/adequate? [ X] Yes  [ ]  No:     - PO intake :   [ ] >75%  Adequate    [ ] 50-75%  Fair        [ ] <50%  Poor  - Nutrition-Related Concerns:    - Micronutrient Supplementation: dextrose 5%. 1000 milliLiter(s) IV Continuous <Continuous>  dextrose 5%. 1000 milliLiter(s) IV Continuous <Continuous>  dextrose 5%. 1000 milliLiter(s) IV Continuous <Continuous>  folic acid 1 milliGRAM(s) Oral daily  Nephro-celeste 1 Tablet(s) Oral daily  sodium chloride 0.9% lock flush 10 milliLiter(s) IV Push every 1 hour PRN    GI:  Last BM _2/12  x2 watery__.   Bowel Regimen? consideration of stool bulking agents with RN should diarrhea continue with c.diff negative.    Weights:   Daily Weight in k.7 (), Weight in k (), Weight in k.6 (02-10)    Pertinent Labs:  @ 13:37: Na 135, BUN 57<H>, Cr 2.47<H>, <H>, K+ 3.9, Phos --, Mg --, Alk Phos --, ALT/SGPT --, AST/SGOT --, HbA1c --   @ 05:30: Na 136, BUN 41<H>, Cr 2.40<H>, <H>, K+ 3.2<L>, Phos --, Mg --, Alk Phos --, ALT/SGPT --, AST/SGOT --, HbA1c --    A1C with Estimated Average Glucose Result: 5.8 % (22 @ 09:27)  A1C with Estimated Average Glucose Result: 6.4 % (10-22-21 @ 08:56)    Finger Sticks:  POCT Blood Glucose.: 237 mg/dL ( @ 17:23)  POCT Blood Glucose.: 197 mg/dL ( @ 11:45)  POCT Blood Glucose.: 168 mg/dL ( @ 05:35)  POCT Blood Glucose.: 126 mg/dL ( @ 23:33)      Skin per nursing documentation:   Edema:     Estimated Needs:   [X ] no change since previous assessment    Previous Nutrition Diagnosis: Increased Nutrient Needs; Food and Nutrition Related Knowledge Deficit;  Severe acute on chronic malnutrition  Nutrition Diagnosis is: [x] ongoing  [] resolved [] not applicable   Being addressed with tube feeding regimen via PEG and micronutrient supplementation    New Nutrition Diagnosis: None     Nutrition Care Plan: continue tube feeding as ordered  [x] In Progress        Nutrition Interventions / Recommendations:    1) discussed continuation of Tf with NP pending stool sampling for r/o c.diff                                                          2) Continue Nepro bolus feeds 330 ml 4x daily (08:00, 12:00, 16:00, 20:00) with consideration for Sinemet administration. To provide 1320 ml total volume, 2347 kcal, 31/kg,  total 107 g pro, 1.4/kg based on 75kg. Total  960 ml free water. .     3) RD to follow up per protocol or as needed    4) may consider metamucil if diarrhea continues and stools are negative c.diff .   Discussed with team NP, RN  Monitoring and Evaluation:   Continue to monitor Nutritional intake, Tolerance to diet prescription, weights, labs, skin integrity      RD remains available upon request and will follow up per protocol

## 2022-02-12 NOTE — PROGRESS NOTE ADULT - SUBJECTIVE AND OBJECTIVE BOX
Chief complaint    Patient is a 71y old  Male who presents with a chief complaint of leg swelling (12 Feb 2022 11:39)   Review of systems  Patient in bed, appears comfortable.    Labs and Fingersticks  CAPILLARY BLOOD GLUCOSE      POCT Blood Glucose.: 197 mg/dL (12 Feb 2022 11:45)  POCT Blood Glucose.: 168 mg/dL (12 Feb 2022 05:35)  POCT Blood Glucose.: 126 mg/dL (11 Feb 2022 23:33)  POCT Blood Glucose.: 167 mg/dL (11 Feb 2022 17:02)      Anion Gap, Serum: 11 (02-12 @ 05:30)  Anion Gap, Serum: 9 (02-11 @ 07:03)  Anion Gap, Serum: 8 (02-11 @ 05:37)      Calcium, Total Serum: 7.9 *L* (02-12 @ 05:30)  Calcium, Total Serum: 9.0 (02-11 @ 07:03)  Calcium, Total Serum: 4.5 *LL* (02-11 @ 05:37)  Albumin, Serum: 0.9 *L* (02-11 @ 05:37)    Alanine Aminotransferase (ALT/SGPT): <5 *L* (02-11 @ 05:37)  Alkaline Phosphatase, Serum: 36 *L* (02-11 @ 05:37)  Aspartate Aminotransferase (AST/SGOT): 20 (02-11 @ 05:37)        02-12    136  |  100  |  41<H>  ----------------------------<  174<H>  3.2<L>   |  25  |  2.40<H>    Ca    7.9<L>      12 Feb 2022 05:30    TPro  3.5<L>  /  Alb  0.9<L>  /  TBili  0.2  /  DBili  x   /  AST  20  /  ALT  <5<L>  /  AlkPhos  36<L>  02-11                        7.1    11.45 )-----------( 294      ( 12 Feb 2022 05:30 )             23.2     Medications  MEDICATIONS  (STANDING):  atorvastatin 80 milliGRAM(s) Oral at bedtime  carbidopa/levodopa  25/100 2.5 Tablet(s) Oral <User Schedule>  carbidopa/levodopa  25/100 2 Tablet(s) Oral <User Schedule>  chlorhexidine 4% Liquid 1 Application(s) Topical <User Schedule>  cinacalcet 60 milliGRAM(s) Oral daily  dextrose 40% Gel 15 Gram(s) Oral once  dextrose 5%. 1000 milliLiter(s) (50 mL/Hr) IV Continuous <Continuous>  dextrose 5%. 1000 milliLiter(s) (100 mL/Hr) IV Continuous <Continuous>  dextrose 5%. 1000 milliLiter(s) (40 mL/Hr) IV Continuous <Continuous>  dextrose 50% Injectable 25 Gram(s) IV Push once  dextrose 50% Injectable 12.5 Gram(s) IV Push once  dextrose 50% Injectable 25 Gram(s) IV Push once  dextrose 50% Injectable 12.5 Gram(s) IV Push once  dextrose 50% Injectable 25 Gram(s) IV Push once  diVALproex Sprinkle 250 milliGRAM(s) Oral three times a day  DULoxetine 20 milliGRAM(s) Oral daily  folic acid 1 milliGRAM(s) Oral daily  glucagon  Injectable 1 milliGRAM(s) IntraMuscular once  insulin lispro (ADMELOG) corrective regimen sliding scale   SubCutaneous every 6 hours  levothyroxine Injectable 12.5 MICROGram(s) IV Push <User Schedule>  memantine 5 milliGRAM(s) Oral at bedtime  metoprolol tartrate Injectable 5 milliGRAM(s) IV Push every 6 hours  midodrine 10 milliGRAM(s) Oral <User Schedule>  mirtazapine 7.5 milliGRAM(s) Oral daily  Nephro-celeste 1 Tablet(s) Oral daily  pantoprazole  Injectable 40 milliGRAM(s) IV Push two times a day  QUEtiapine 50 milliGRAM(s) Oral at bedtime  tamsulosin 0.4 milliGRAM(s) Oral at bedtime  trihexyphenidyl 2 milliGRAM(s) Oral three times a day      Physical Exam  General: Patient comfortable in bed  Vital Signs Last 12 Hrs  T(F): 97.6 (02-12-22 @ 10:50), Max: 97.7 (02-12-22 @ 04:24)  HR: 125 (02-12-22 @ 10:50) (78 - 125)  BP: 129/71 (02-12-22 @ 10:50) (95/53 - 129/71)  BP(mean): --  RR: 17 (02-12-22 @ 10:50) (17 - 18)  SpO2: 98% (02-12-22 @ 10:50) (98% - 99%)  Neck: No palpable thyroid nodules.  CVS: S1S2, No murmurs  Respiratory: No wheezing, no crepitations  GI: Abdomen soft, bowel sounds positive  Musculoskeletal:  edema lower extremities.     Diagnostics    Free Thyroxine, Serum: AM Sched. Collection: 27-Jan-2022 06:00 (01-26 @ 08:06)  Thyroid Stimulating Hormone, Serum: AM Sched. Collection: 27-Jan-2022 06:00 (01-26 @ 08:06)  Free Thyroxine, Serum: AM Sched. Collection: 19-Jan-2022 06:00 (01-18 @ 15:42)  Cortisol AM, Serum: AM Sched. Collection: 15-Paul-2022 06:00 (01-14 @ 08:01)  Free Thyroxine, Serum: AM Sched. Collection: 15-Paul-2022 06:00 (01-14 @ 08:01)  Thyroid Stimulating Hormone, Serum: AM Sched. Collection: 15-Paul-2022 06:00 (01-14 @ 08:01)

## 2022-02-13 NOTE — PROGRESS NOTE ADULT - ASSESSMENT
70yo M w/ PMHx of CAD (s/p CABG 2019), CKD (unknown stage), DM2, Parkinson's Disease, HTN, depression presents with new onset acute heart failure exacerbation, NSTEMI, started on hep gtt, developed bl RP bleed, acute anemia. sp MICU course for hemorrhagic shock, 10/28 sp IR embolization l4 and l5 lumbar artery. sp permacath and AVF.    # chronic systolic and diastolic heart failure  # ESRD, new HD  # Atrial flutter  # Parkinson's disease   # delirium  # CAD (coronary artery disease)  # HTN  # DM2 (diabetes mellitus, type 2)  # FTT sp PEG  # Cdiff colitis  #GIbleed  midodrine during dialysis  sp L AVF using for HD,   1/29 malfunctioning Lt AVF - 2/2 sp fistulogram and angioplasty by IR - nonfunctioning  sp shiley 2/4, sp permacath 2/10  afib rvr as missed PO medication, cont lopressor, atorvastatin - can give by mouth now that no PEG, PEG not for dysphagia     2/6 pt pulled out peg tube  pt remains on mittens  psych reconsulted-returning to the dose of the Seroquel  to 50mg qhs and prns of Seroquel 12.5mg q6hrs prn agitation.  Depakote 250mg TID monitor PLT/LFTPRN: Depacon 125mg IV q8h PRN agitation, Ativan 0.25mg PO q8h PRN SEVERE agitation only     sp permacath and PEG placement 2/10  son wish all aggressive measures  palliative reconsult appreciated    Cdiff colitis and GI bleed  transfuse 2uprbc  vanco po qid  GI cs fu- urgent EGD    [facility]  [phone]      DVT ppx hsq    DNR/DNI    [facility]  [phone]

## 2022-02-13 NOTE — PROGRESS NOTE ADULT - SUBJECTIVE AND OBJECTIVE BOX
Chief Complaint:  Patient is a 71y old  Male who presents with a chief complaint of leg swelling (2022 14:26)      Date of service 22 @ 19:30      Interval Events:   [facility] Medications:  acetaminophen     Tablet .. 650 milliGRAM(s) Oral every 6 hours PRN  albuterol/ipratropium for Nebulization 3 milliLiter(s) Nebulizer every 6 hours PRN  atorvastatin 80 milliGRAM(s) Oral at bedtime  carbidopa/levodopa  25/100 2.5 Tablet(s) Oral <User Schedule>  carbidopa/levodopa  25/100 2 Tablet(s) Oral <User Schedule>  chlorhexidine 4% Liquid 1 Application(s) Topical <User Schedule>  cinacalcet 60 milliGRAM(s) Oral daily  dextrose 40% Gel 15 Gram(s) Oral once  dextrose 5%. 1000 milliLiter(s) IV Continuous <Continuous>  dextrose 5%. 1000 milliLiter(s) IV Continuous <Continuous>  dextrose 5%. 1000 milliLiter(s) IV Continuous <Continuous>  dextrose 50% Injectable 25 Gram(s) IV Push once  dextrose 50% Injectable 12.5 Gram(s) IV Push once  dextrose 50% Injectable 25 Gram(s) IV Push once  dextrose 50% Injectable 12.5 Gram(s) IV Push once  dextrose 50% Injectable 25 Gram(s) IV Push once  diVALproex Sprinkle 125 milliGRAM(s) Oral every 8 hours PRN  diVALproex Sprinkle 250 milliGRAM(s) Oral three times a day  DULoxetine 20 milliGRAM(s) Oral daily  folic acid 1 milliGRAM(s) Oral daily  glucagon  Injectable 1 milliGRAM(s) IntraMuscular once  guaiFENesin Oral Liquid (Sugar-Free) 200 milliGRAM(s) Oral every 6 hours PRN  insulin lispro (ADMELOG) corrective regimen sliding scale   SubCutaneous every 6 hours  levothyroxine Injectable 12.5 MICROGram(s) IV Push <User Schedule>  memantine 5 milliGRAM(s) Oral at bedtime  metoprolol tartrate Injectable 5 milliGRAM(s) IV Push every 6 hours  midodrine 10 milliGRAM(s) Oral <User Schedule>  mirtazapine 7.5 milliGRAM(s) Oral daily  Nephro-celeste 1 Tablet(s) Oral daily  pantoprazole Infusion 8 mG/Hr IV Continuous <Continuous>  QUEtiapine 50 milliGRAM(s) Oral at bedtime  QUEtiapine 12.5 milliGRAM(s) Oral every 6 hours PRN  sodium chloride 0.9% lock flush 10 milliLiter(s) IV Push every 1 hour PRN  tamsulosin 0.4 milliGRAM(s) Oral at bedtime  trihexyphenidyl 2 milliGRAM(s) Oral three times a day  vancomycin    Solution 125 milliGRAM(s) Oral every 6 hours          PHYSICAL EXAM:   Vital Signs:  Vital Signs Last 24 Hrs  T(C): 36.3 (2022 18:14), Max: 36.8 (2022 20:34)  T(F): 97.3 (2022 18:14), Max: 98.2 (2022 20:34)  HR: 108 (2022 18:14) (99 - 118)  BP: 102/62 (2022 18:14) (90/55 - 118/60)  BP(mean): --  RR: 18 (2022 14:09) (16 - 18)  SpO2: 99% (2022 14:09) (93% - 99%)  Daily     Daily Weight in k.2 (2022 07:48)      PHYSICAL EXAM:     GENERAL:  Appears stated age, well-groomed, well-nourished, no distress  HEENT:  NC/AT,  conjunctivae anicteric, clear and pink,   NECK: supple, trachea midline  CHEST:  Full & symmetric excursion, no increased effort, breath sounds clear  HEART:  Regular rhythm, no JVD  ABDOMEN:  Soft, non-tender, non-distended, normoactive bowel sounds,  no masses , no hepatosplenomegaly, gastrostomy  EXTREMITIES:  no cyanosis,clubbing or edema  SKIN:  No rash, erythema, or, ecchymoses, no jaundice  NEURO:  non-focal, no asterixis    RECTAL: Deferred      LABS Personally reviewed by me:                        5.1    18.83 )-----------( 248      ( 2022 11:57 )             16.6     Mean Cell Volume: 111.4 fl (-22 @ 11:57)        131<L>  |  95<L>  |  83<H>  ----------------------------<  179<H>  4.1   |  21<L>  |  2.94<H>    Ca    8.0<L>      2022 06:33                                    5.1    18.83 )-----------( 248      ( 2022 11:57 )             16.6                         5.9    19.54 )-----------( 237      ( 2022 06:34 )             19.8                         7.9    21.29 )-----------( 285      ( 2022 13:37 )             26.9                         7.1    11.45 )-----------( 294      ( 2022 05:30 )             23.2                         8.5    9.31  )-----------( 334      ( 2022 07:04 )             28.5       Imaging personally reviewed by me:

## 2022-02-13 NOTE — PROGRESS NOTE ADULT - SUBJECTIVE AND OBJECTIVE BOX
Patient seen and examined in bed resting comfortably.  H/H trending down.  It was reported by PCA to me that patient has been having very dark loose stools since yesterday.    REVIEW OF SYSTEMS:  UTO; confused    MEDICATIONS  (STANDING):  atorvastatin 80 milliGRAM(s) Oral at bedtime  carbidopa/levodopa  25/100 2.5 Tablet(s) Oral <User Schedule>  carbidopa/levodopa  25/100 2 Tablet(s) Oral <User Schedule>  chlorhexidine 4% Liquid 1 Application(s) Topical <User Schedule>  cinacalcet 60 milliGRAM(s) Oral daily  dextrose 40% Gel 15 Gram(s) Oral once  dextrose 5%. 1000 milliLiter(s) (40 mL/Hr) IV Continuous <Continuous>  dextrose 5%. 1000 milliLiter(s) (50 mL/Hr) IV Continuous <Continuous>  dextrose 5%. 1000 milliLiter(s) (100 mL/Hr) IV Continuous <Continuous>  dextrose 50% Injectable 25 Gram(s) IV Push once  dextrose 50% Injectable 12.5 Gram(s) IV Push once  dextrose 50% Injectable 25 Gram(s) IV Push once  dextrose 50% Injectable 12.5 Gram(s) IV Push once  dextrose 50% Injectable 25 Gram(s) IV Push once  diVALproex Sprinkle 250 milliGRAM(s) Oral three times a day  DULoxetine 20 milliGRAM(s) Oral daily  folic acid 1 milliGRAM(s) Oral daily  glucagon  Injectable 1 milliGRAM(s) IntraMuscular once  insulin lispro (ADMELOG) corrective regimen sliding scale   SubCutaneous every 6 hours  levothyroxine Injectable 12.5 MICROGram(s) IV Push <User Schedule>  memantine 5 milliGRAM(s) Oral at bedtime  metoprolol tartrate Injectable 5 milliGRAM(s) IV Push every 6 hours  midodrine 10 milliGRAM(s) Oral <User Schedule>  mirtazapine 7.5 milliGRAM(s) Oral daily  Nephro-celeste 1 Tablet(s) Oral daily  pantoprazole Infusion 8 mG/Hr (10 mL/Hr) IV Continuous <Continuous>  QUEtiapine 50 milliGRAM(s) Oral at bedtime  tamsulosin 0.4 milliGRAM(s) Oral at bedtime  trihexyphenidyl 2 milliGRAM(s) Oral three times a day  vancomycin    Solution 125 milliGRAM(s) Oral every 6 hours      VITAL:  T(C): , Max: 36.8 (02-12-22 @ 20:34)  T(F): , Max: 98.2 (02-12-22 @ 20:34)  HR: 118 (02-13-22 @ 14:09)  BP: 96/60 (02-13-22 @ 14:09)  BP(mean): --  RR: 18 (02-13-22 @ 14:09)  SpO2: 99% (02-13-22 @ 14:09)  Wt(kg): --    I and O's:    02-12 @ 07:01  -  02-13 @ 07:00  --------------------------------------------------------  IN: 0 mL / OUT: 300 mL / NET: -300 mL    02-13 @ 07:01  -  02-13 @ 14:26  --------------------------------------------------------  IN: 0 mL / OUT: 100 mL / NET: -100 mL          PHYSICAL EXAM:    Constitutional: NAD  HEENT: PERRLA, EOMI,  MMM  Neck: No LAD, No JVD  Respiratory: CTAB  Cardiovascular: S1 and S2  Gastrointestinal: BS+, +PEG  Extremities: Left hand swelling  Neurological: uto  : No Javier  Skin: No rashes  Access: + AVF; +permacath     LABS:                        5.1    18.83 )-----------( 248      ( 13 Feb 2022 11:57 )             16.6     02-13    131<L>  |  95<L>  |  83<H>  ----------------------------<  179<H>  4.1   |  21<L>  |  2.94<H>    Ca    8.0<L>      13 Feb 2022 06:33      ASSESSMENT:   71M w/ HTN, DM2, CAD-CABG, Parkinson's disease, and advanced CKD, 10/21/21 p/w LE swelling, c/b COVID; now newly ESRD-HD as of this admission    (1)Renal - ESRD-HD MWF - plan for HD tomorrow  (2)Anemia- significant drop in H/H; planned for PRBC administration   (3)Failure to thrive and confusion     RECOMMEND  (1)Plan for HD tomorrow; Please do not use AVF(maturing), use perm cath  (2)No objection to PRBC administration as ordered.  H/H monitoring as per primary team.  If H/H continues to remain low tomorrow, will likely need PRBC with HD   (3)Nutritional support; management per primary team   (4)Another BAM next week if still here (on AVF) and medically stable (H/H stable)  (5)Would favor GI consult at this time to evaluate sudden drop in H/H.  (6)Check fecal occult

## 2022-02-13 NOTE — CHART NOTE - NSCHARTNOTEFT_GEN_A_CORE
Hgb 5.9 this am, repeat H & H 5.1  & 16.6                     5.1    18.83 )-----------( 248      ( 13 Feb 2022 11:57 )             16.6     As per RN , he had black diarrhea in am X1. pt seen and examined at bedside. Alert and awake , confused. Denies SOB, dizziness, aplpitaitons  vital signs reviewed and medications reviewed. No AC or antiplatelets. Abdomen soft, nontender, BS present.     Vital Signs Last 24 Hrs  T(C): 36.6 (13 Feb 2022 14:09), Max: 36.8 (12 Feb 2022 20:34)  T(F): 97.8 (13 Feb 2022 14:09), Max: 98.2 (12 Feb 2022 20:34)  HR: 118 (13 Feb 2022 14:09) (99 - 118)  BP: 96/60 (13 Feb 2022 14:09) (90/55 - 121/67)  BP(mean): --  RR: 18 (13 Feb 2022 14:09) (14 - 18)  SpO2: 99% (13 Feb 2022 14:09) (93% - 99%)    -Will give total 2 units PRBC ( 1 unit for 3 hours and 1/2 unit X2, Lasix 60 mg IV between)  -Hold tube feeding for now   -Start Protonix gtt   -GI Dr. León aware of hgb dropped and black diarrhea, C- diff positive, recommended PRBC and protonix gtt   -the plan discussed with Dr. Pineda   - Daughter in law Yoly santhosh     [facility] NP-C  #[id number]

## 2022-02-13 NOTE — CHART NOTE - NSCHARTNOTEFT_GEN_A_CORE
MEDICINE NP- EPISODIC NOTE    Notified by RN pt is positive for c. dif. Contact precautions, vanco PO per PEG ordered.    Zaina Keller, NP-BC  [id number]

## 2022-02-13 NOTE — PROGRESS NOTE ADULT - SUBJECTIVE AND OBJECTIVE BOX
Patient is a 71y old  Male who presents with a chief complaint of leg swelling (13 Feb 2022 14:26)      INTERVAL HPI/OVERNIGHT EVENTS: noted  pt seen and examined this am   events noted  +diarrhea, melena  cdiff+    Vital Signs Last 24 Hrs  T(C): 36.3 (13 Feb 2022 18:14), Max: 36.8 (12 Feb 2022 20:34)  T(F): 97.3 (13 Feb 2022 18:14), Max: 98.2 (12 Feb 2022 20:34)  HR: 108 (13 Feb 2022 18:14) (99 - 118)  BP: 102/62 (13 Feb 2022 18:14) (90/55 - 118/60)  BP(mean): --  RR: 18 (13 Feb 2022 14:09) (16 - 18)  SpO2: 99% (13 Feb 2022 14:09) (93% - 99%)    acetaminophen     Tablet .. 650 milliGRAM(s) Oral every 6 hours PRN  albuterol/ipratropium for Nebulization 3 milliLiter(s) Nebulizer every 6 hours PRN  atorvastatin 80 milliGRAM(s) Oral at bedtime  carbidopa/levodopa  25/100 2.5 Tablet(s) Oral <User Schedule>  carbidopa/levodopa  25/100 2 Tablet(s) Oral <User Schedule>  chlorhexidine 4% Liquid 1 Application(s) Topical <User Schedule>  cinacalcet 60 milliGRAM(s) Oral daily  dextrose 40% Gel 15 Gram(s) Oral once  dextrose 5%. 1000 milliLiter(s) IV Continuous <Continuous>  dextrose 5%. 1000 milliLiter(s) IV Continuous <Continuous>  dextrose 5%. 1000 milliLiter(s) IV Continuous <Continuous>  dextrose 50% Injectable 12.5 Gram(s) IV Push once  dextrose 50% Injectable 25 Gram(s) IV Push once  dextrose 50% Injectable 25 Gram(s) IV Push once  dextrose 50% Injectable 12.5 Gram(s) IV Push once  dextrose 50% Injectable 25 Gram(s) IV Push once  diVALproex Sprinkle 125 milliGRAM(s) Oral every 8 hours PRN  diVALproex Sprinkle 250 milliGRAM(s) Oral three times a day  DULoxetine 20 milliGRAM(s) Oral daily  folic acid 1 milliGRAM(s) Oral daily  glucagon  Injectable 1 milliGRAM(s) IntraMuscular once  guaiFENesin Oral Liquid (Sugar-Free) 200 milliGRAM(s) Oral every 6 hours PRN  insulin lispro (ADMELOG) corrective regimen sliding scale   SubCutaneous every 6 hours  levothyroxine Injectable 12.5 MICROGram(s) IV Push <User Schedule>  memantine 5 milliGRAM(s) Oral at bedtime  metoprolol tartrate Injectable 5 milliGRAM(s) IV Push every 6 hours  midodrine 10 milliGRAM(s) Oral <User Schedule>  mirtazapine 7.5 milliGRAM(s) Oral daily  Nephro-celeste 1 Tablet(s) Oral daily  pantoprazole Infusion 8 mG/Hr IV Continuous <Continuous>  QUEtiapine 12.5 milliGRAM(s) Oral every 6 hours PRN  QUEtiapine 50 milliGRAM(s) Oral at bedtime  sodium chloride 0.9% lock flush 10 milliLiter(s) IV Push every 1 hour PRN  tamsulosin 0.4 milliGRAM(s) Oral at bedtime  trihexyphenidyl 2 milliGRAM(s) Oral three times a day  vancomycin    Solution 125 milliGRAM(s) Oral every 6 hours      PHYSICAL EXAM:  GENERAL: NAD,   EYES: conjunctiva and sclera clear  ENMT: Moist mucous membranes  NECK: Supple, No JVD, Normal thyroid  CHEST/LUNG: non labored, cta b/l  HEART: Regular rate and rhythm; No murmurs, rubs, or gallops  ABDOMEN: Soft, mild diffuse tender, Nondistended; Bowel sounds present  EXTREMITIES:  2+ Peripheral Pulses, No clubbing, cyanosis, or edema  LYMPH: No lymphadenopathy noted  SKIN: No rashes or lesions    Consultant(s) Notes Reviewed:  [x ] YES  [ ] NO  Care Discussed with Consultants/Other Providers [ x] YES  [ ] NO    LABS:                        5.1    18.83 )-----------( 248      ( 13 Feb 2022 11:57 )             16.6     02-13    131<L>  |  95<L>  |  83<H>  ----------------------------<  179<H>  4.1   |  21<L>  |  2.94<H>    Ca    8.0<L>      13 Feb 2022 06:33          CAPILLARY BLOOD GLUCOSE      POCT Blood Glucose.: 170 mg/dL (13 Feb 2022 17:14)  POCT Blood Glucose.: 226 mg/dL (13 Feb 2022 11:20)  POCT Blood Glucose.: 181 mg/dL (13 Feb 2022 07:45)  POCT Blood Glucose.: 208 mg/dL (13 Feb 2022 06:03)  POCT Blood Glucose.: 148 mg/dL (12 Feb 2022 23:32)              RADIOLOGY & ADDITIONAL TESTS:    Imaging Personally Reviewed:  [x ] YES  [ ] NO

## 2022-02-13 NOTE — PROGRESS NOTE ADULT - SUBJECTIVE AND OBJECTIVE BOX
Chief complaint    Patient is a 71y old  Male who presents with a chief complaint of leg swelling (13 Feb 2022 19:30)   Review of systems  Patient in bed, appears comfortable.    Labs and Fingersticks  CAPILLARY BLOOD GLUCOSE      POCT Blood Glucose.: 170 mg/dL (13 Feb 2022 17:14)  POCT Blood Glucose.: 226 mg/dL (13 Feb 2022 11:20)  POCT Blood Glucose.: 181 mg/dL (13 Feb 2022 07:45)  POCT Blood Glucose.: 208 mg/dL (13 Feb 2022 06:03)  POCT Blood Glucose.: 148 mg/dL (12 Feb 2022 23:32)      Anion Gap, Serum: 15 (02-13 @ 06:33)  Anion Gap, Serum: 12 (02-12 @ 13:37)  Anion Gap, Serum: 11 (02-12 @ 05:30)      Calcium, Total Serum: 8.0 *L* (02-13 @ 06:33)  Calcium, Total Serum: 8.0 *L* (02-12 @ 13:37)  Calcium, Total Serum: 7.9 *L* (02-12 @ 05:30)          02-13    131<L>  |  95<L>  |  83<H>  ----------------------------<  179<H>  4.1   |  21<L>  |  2.94<H>    Ca    8.0<L>      13 Feb 2022 06:33                          5.1    18.83 )-----------( 248      ( 13 Feb 2022 11:57 )             16.6     Medications  MEDICATIONS  (STANDING):  atorvastatin 80 milliGRAM(s) Oral at bedtime  carbidopa/levodopa  25/100 2.5 Tablet(s) Oral <User Schedule>  carbidopa/levodopa  25/100 2 Tablet(s) Oral <User Schedule>  chlorhexidine 4% Liquid 1 Application(s) Topical <User Schedule>  cinacalcet 60 milliGRAM(s) Oral daily  dextrose 40% Gel 15 Gram(s) Oral once  dextrose 5%. 1000 milliLiter(s) (100 mL/Hr) IV Continuous <Continuous>  dextrose 5%. 1000 milliLiter(s) (50 mL/Hr) IV Continuous <Continuous>  dextrose 5%. 1000 milliLiter(s) (40 mL/Hr) IV Continuous <Continuous>  dextrose 50% Injectable 25 Gram(s) IV Push once  dextrose 50% Injectable 12.5 Gram(s) IV Push once  dextrose 50% Injectable 25 Gram(s) IV Push once  dextrose 50% Injectable 12.5 Gram(s) IV Push once  dextrose 50% Injectable 25 Gram(s) IV Push once  diVALproex Sprinkle 250 milliGRAM(s) Oral three times a day  DULoxetine 20 milliGRAM(s) Oral daily  folic acid 1 milliGRAM(s) Oral daily  glucagon  Injectable 1 milliGRAM(s) IntraMuscular once  insulin lispro (ADMELOG) corrective regimen sliding scale   SubCutaneous every 6 hours  levothyroxine Injectable 12.5 MICROGram(s) IV Push <User Schedule>  memantine 5 milliGRAM(s) Oral at bedtime  metoprolol tartrate Injectable 5 milliGRAM(s) IV Push every 6 hours  midodrine 10 milliGRAM(s) Oral <User Schedule>  mirtazapine 7.5 milliGRAM(s) Oral daily  Nephro-celeste 1 Tablet(s) Oral daily  pantoprazole Infusion 8 mG/Hr (10 mL/Hr) IV Continuous <Continuous>  QUEtiapine 50 milliGRAM(s) Oral at bedtime  tamsulosin 0.4 milliGRAM(s) Oral at bedtime  trihexyphenidyl 2 milliGRAM(s) Oral three times a day  vancomycin    Solution 125 milliGRAM(s) Oral every 6 hours      Physical Exam  General: Patient comfortable in bed  Vital Signs Last 12 Hrs  T(F): 97.4 (02-13-22 @ 19:35), Max: 97.8 (02-13-22 @ 14:09)  HR: 108 (02-13-22 @ 19:35) (108 - 118)  BP: 114/65 (02-13-22 @ 19:35) (95/54 - 114/65)  BP(mean): --  RR: 18 (02-13-22 @ 19:35) (18 - 18)  SpO2: 100% (02-13-22 @ 19:35) (93% - 100%)  Neck: No palpable thyroid nodules.  CVS: S1S2, No murmurs  Respiratory: No wheezing, no crepitations  GI: Abdomen soft, bowel sounds positive  Musculoskeletal:  edema lower extremities.     Diagnostics    Vitamin D, 25-Hydroxy: AM Sched. Collection: 14-Feb-2022 06:00 (02-13 @ 07:00)  Thyroid Stimulating Hormone, Serum: AM Sched. Collection: 14-Feb-2022 06:00 (02-13 @ 07:00)  Free Thyroxine, Serum: AM Sched. Collection: 14-Feb-2022 06:00 (02-13 @ 07:00)  Free Thyroxine, Serum: AM Sched. Collection: 27-Jan-2022 06:00 (01-26 @ 08:06)  Thyroid Stimulating Hormone, Serum: AM Sched. Collection: 27-Jan-2022 06:00 (01-26 @ 08:06)  Free Thyroxine, Serum: AM Sched. Collection: 19-Jan-2022 06:00 (01-18 @ 15:42)

## 2022-02-14 NOTE — PROGRESS NOTE ADULT - ASSESSMENT
Assessment  DMT2: 71y Male with DM T2 with hyperglycemia, A1C 6.4%, was on insulin at home, now on insulin coverage only, blood sugars are trending within overall acceptable range, no hypoglycemias. Patient on tube feeds, has dropping hgb, may need egd per GI.  Hypothyroidism: Patient has no history thyroid disease, was not on any thyroid supplements, subclinical with low-normal FT4, lethargic, on synthroid 25 mcg po daily, now on synthroid 25mcg via peg, repeat TFTs improved and euthyroid.  Hypercalcemia: Started on Sensipar 60mg daily, Ca improving.  CHF: on medications, stable, monitored.  HTN: Controlled,  on antihypertensive medications.  Parkinsons: on meds, monitored.  CKD: Monitor labs/BMP       Assessment  DMT2: 71y Male with DM T2 with hyperglycemia, A1C 6.4%, was on insulin at home, now on insulin coverage only, blood sugars are trending within overall acceptable range, no hypoglycemias. Patient on tube feeds, has dropping hgb, may need egd per GI.  Hypothyroidism: Patient has no history thyroid disease, was not on any thyroid supplements, subclinical with low-normal FT4, lethargic, on synthroid 12.5 mcg IV daily, repeat TFTs show sub clinical state.  Hypercalcemia: Started on Sensipar 60mg daily, Ca improving.  CHF: on medications, stable, monitored.  HTN: Controlled,  on antihypertensive medications.  Parkinsons: on meds, monitored.  CKD: Monitor labs/BMP       Assessment  DMT2: 71y Male with DM T2 with hyperglycemia, A1C 6.4%, was on insulin at home, now on insulin coverage only, blood sugars are trending within overall acceptable range, no hypoglycemias. Patient on tube feeds, has dropping hgb, may need egd per GI.  Hypothyroidism: Patient has no history thyroid disease, was not on any thyroid supplements, subclinical with low-normal FT4,  lethargic, on synthroid 12.5 mcg IV daily, repeat TFTs show sub clinical state.  Hypercalcemia: Started on Sensipar 60mg daily, Ca improving.  CHF: on medications, stable, monitored.  HTN: Controlled,  on antihypertensive medications.  Parkinsons: on meds, monitored.  CKD: Monitor labs/BMP

## 2022-02-14 NOTE — PROGRESS NOTE ADULT - PROBLEM SELECTOR PLAN 3
Will continue current  synthroid dose. Will continue monitoring TFTs and FU.  Suggest to FU outpatient, repeat TFTs in 4-6 weeks. Will increase  synthroid dose to 20 mcg IV daily for now.  Will continue monitoring TFTs and FU.  Suggest to FU outpatient, repeat TFTs in 4-6 weeks.

## 2022-02-14 NOTE — PROGRESS NOTE ADULT - ASSESSMENT
71M w/ HTN, DM2, CAD-CABG, Parkinson's disease, and advanced CKD, 10/21/21 p/w LE swelling, c/b COVID; now newly ESRD-HD as of this admission    (1)Renal - ESRD-HD MWF - plan for HD tomorrow  (2)Anemia- significant drop in H/H; planned for PRBC administration   (3)Failure to thrive and confusion     RECOMMEND  (1)Plan for HD in am.  Please do not use AVF(maturing), use perm cath  (2)1 unit of blood at HD.  H/H monitoring as per primary team.    (3)IV protonix gtt   (4)Check fecal occult again or CT with contrast     [facility]   [phone]

## 2022-02-14 NOTE — PROGRESS NOTE ADULT - SUBJECTIVE AND OBJECTIVE BOX
Chief Complaint:  Patient is a 71y old  Male who presents with a chief complaint of leg swelling (13 Feb 2022 20:04)      Date of service 02-14-22 @ 11:55      Interval Events:   Patient seen and examined. C-diff +. Had episode of melena overnight. Repeat hgb 6.0.     Hospital Medications:  acetaminophen     Tablet .. 650 milliGRAM(s) Oral every 6 hours PRN  albuterol/ipratropium for Nebulization 3 milliLiter(s) Nebulizer every 6 hours PRN  atorvastatin 80 milliGRAM(s) Oral at bedtime  carbidopa/levodopa  25/100 2.5 Tablet(s) Oral <User Schedule>  carbidopa/levodopa  25/100 2 Tablet(s) Oral <User Schedule>  chlorhexidine 4% Liquid 1 Application(s) Topical <User Schedule>  cinacalcet 60 milliGRAM(s) Oral daily  dextrose 40% Gel 15 Gram(s) Oral once  dextrose 5%. 1000 milliLiter(s) IV Continuous <Continuous>  dextrose 5%. 1000 milliLiter(s) IV Continuous <Continuous>  dextrose 5%. 1000 milliLiter(s) IV Continuous <Continuous>  dextrose 50% Injectable 25 Gram(s) IV Push once  dextrose 50% Injectable 12.5 Gram(s) IV Push once  dextrose 50% Injectable 25 Gram(s) IV Push once  dextrose 50% Injectable 12.5 Gram(s) IV Push once  dextrose 50% Injectable 25 Gram(s) IV Push once  diVALproex Sprinkle 125 milliGRAM(s) Oral every 8 hours PRN  diVALproex Sprinkle 250 milliGRAM(s) Oral three times a day  DULoxetine 20 milliGRAM(s) Oral daily  folic acid 1 milliGRAM(s) Oral daily  glucagon  Injectable 1 milliGRAM(s) IntraMuscular once  guaiFENesin Oral Liquid (Sugar-Free) 200 milliGRAM(s) Oral every 6 hours PRN  insulin lispro (ADMELOG) corrective regimen sliding scale   SubCutaneous every 6 hours  levothyroxine Injectable 12.5 MICROGram(s) IV Push <User Schedule>  memantine 5 milliGRAM(s) Oral at bedtime  metoprolol tartrate Injectable 5 milliGRAM(s) IV Push every 6 hours  midodrine 10 milliGRAM(s) Oral <User Schedule>  mirtazapine 7.5 milliGRAM(s) Oral daily  Nephro-celeste 1 Tablet(s) Oral daily  pantoprazole Infusion 8 mG/Hr IV Continuous <Continuous>  QUEtiapine 50 milliGRAM(s) Oral at bedtime  QUEtiapine 12.5 milliGRAM(s) Oral every 6 hours PRN  sodium chloride 0.9% lock flush 10 milliLiter(s) IV Push every 1 hour PRN  tamsulosin 0.4 milliGRAM(s) Oral at bedtime  trihexyphenidyl 2 milliGRAM(s) Oral three times a day  vancomycin    Solution 125 milliGRAM(s) Oral every 6 hours        Review of Systems:  unable to obtain    PHYSICAL EXAM:   Vital Signs:  Vital Signs Last 24 Hrs  T(C): 36.5 (14 Feb 2022 04:25), Max: 36.6 (13 Feb 2022 14:09)  T(F): 97.7 (14 Feb 2022 04:25), Max: 97.8 (13 Feb 2022 14:09)  HR: 116 (14 Feb 2022 04:25) (101 - 118)  BP: 99/62 (14 Feb 2022 04:25) (86/46 - 114/65)  BP(mean): --  RR: 18 (14 Feb 2022 04:25) (18 - 18)  SpO2: 100% (14 Feb 2022 04:25) (93% - 100%)  Daily     Daily       PHYSICAL EXAM:     GENERAL:  Appears stated age, well-groomed, well-nourished, no distress  HEENT:  NC/AT,  conjunctivae anicteric, clear and pink,   NECK: supple, trachea midline  CHEST:  Full & symmetric excursion, no increased effort, breath sounds clear  HEART:  Regular rhythm, no JVD  ABDOMEN:  Soft, non-tender, non-distended, normoactive bowel sounds,  no masses , no hepatosplenomegaly. +gastrostomy   EXTREMITIES:  no cyanosis,clubbing or edema  SKIN:  No rash, erythema, or, ecchymoses, no jaundice  NEURO:   non-focal, no asterixis  RECTAL: Deferred      LABS Personally reviewed by me:                        6.0    16.23 )-----------( 217      ( 14 Feb 2022 09:04 )             18.2     Mean Cell Volume: 99.5 fl (02-14-22 @ 09:04)    02-14    128<L>  |  93<L>  |  93<H>  ----------------------------<  145<H>  4.3   |  22  |  3.36<H>    Ca    7.9<L>      14 Feb 2022 09:04  Phos  1.9     02-14  Mg     1.8     02-14                                    6.0    16.23 )-----------( 217      ( 14 Feb 2022 09:04 )             18.2                         5.1    18.83 )-----------( 248      ( 13 Feb 2022 11:57 )             16.6                         5.9    19.54 )-----------( 237      ( 13 Feb 2022 06:34 )             19.8                         7.9    21.29 )-----------( 285      ( 12 Feb 2022 13:37 )             26.9                         7.1    11.45 )-----------( 294      ( 12 Feb 2022 05:30 )             23.2       Imaging personally reviewed by me:

## 2022-02-14 NOTE — PROGRESS NOTE ADULT - SUBJECTIVE AND OBJECTIVE BOX
Chief complaint  Patient is a 71y old  Male who presents with a chief complaint of leg swelling (14 Feb 2022 11:55)   Review of systems  NAD, no hypoglycemic episodes.    Labs and Fingersticks  CAPILLARY BLOOD GLUCOSE      POCT Blood Glucose.: 196 mg/dL (14 Feb 2022 12:12)  POCT Blood Glucose.: 169 mg/dL (14 Feb 2022 05:48)  POCT Blood Glucose.: 190 mg/dL (14 Feb 2022 00:32)  POCT Blood Glucose.: 166 mg/dL (13 Feb 2022 21:27)  POCT Blood Glucose.: 170 mg/dL (13 Feb 2022 17:14)      Anion Gap, Serum: 13 (02-14 @ 09:04)  Anion Gap, Serum: 15 (02-13 @ 06:33)  Anion Gap, Serum: 12 (02-12 @ 13:37)      Calcium, Total Serum: 7.9 *L* (02-14 @ 09:04)  Calcium, Total Serum: 8.0 *L* (02-13 @ 06:33)  Calcium, Total Serum: 8.0 *L* (02-12 @ 13:37)          02-14    128<L>  |  93<L>  |  93<H>  ----------------------------<  145<H>  4.3   |  22  |  3.36<H>    Ca    7.9<L>      14 Feb 2022 09:04  Phos  1.9     02-14  Mg     1.8     02-14                          6.0    16.23 )-----------( 217      ( 14 Feb 2022 09:04 )             18.2     Medications  MEDICATIONS  (STANDING):  atorvastatin 80 milliGRAM(s) Oral at bedtime  carbidopa/levodopa  25/100 2.5 Tablet(s) Oral <User Schedule>  carbidopa/levodopa  25/100 2 Tablet(s) Oral <User Schedule>  chlorhexidine 4% Liquid 1 Application(s) Topical <User Schedule>  cinacalcet 60 milliGRAM(s) Oral daily  dextrose 40% Gel 15 Gram(s) Oral once  dextrose 5%. 1000 milliLiter(s) (50 mL/Hr) IV Continuous <Continuous>  dextrose 5%. 1000 milliLiter(s) (100 mL/Hr) IV Continuous <Continuous>  dextrose 5%. 1000 milliLiter(s) (40 mL/Hr) IV Continuous <Continuous>  dextrose 50% Injectable 25 Gram(s) IV Push once  dextrose 50% Injectable 12.5 Gram(s) IV Push once  dextrose 50% Injectable 25 Gram(s) IV Push once  dextrose 50% Injectable 12.5 Gram(s) IV Push once  dextrose 50% Injectable 25 Gram(s) IV Push once  diVALproex Sprinkle 250 milliGRAM(s) Oral three times a day  DULoxetine 20 milliGRAM(s) Oral daily  folic acid 1 milliGRAM(s) Oral daily  glucagon  Injectable 1 milliGRAM(s) IntraMuscular once  insulin lispro (ADMELOG) corrective regimen sliding scale   SubCutaneous every 6 hours  levothyroxine Injectable 12.5 MICROGram(s) IV Push <User Schedule>  memantine 5 milliGRAM(s) Oral at bedtime  metoprolol tartrate Injectable 5 milliGRAM(s) IV Push every 6 hours  midodrine 10 milliGRAM(s) Oral <User Schedule>  mirtazapine 7.5 milliGRAM(s) Oral daily  Nephro-celeste 1 Tablet(s) Oral daily  pantoprazole Infusion 8 mG/Hr (10 mL/Hr) IV Continuous <Continuous>  QUEtiapine 50 milliGRAM(s) Oral at bedtime  tamsulosin 0.4 milliGRAM(s) Oral at bedtime  trihexyphenidyl 2 milliGRAM(s) Oral three times a day  vancomycin    Solution 125 milliGRAM(s) Oral every 6 hours      Physical Exam  General: Patient comfortable in bed  Vital Signs Last 12 Hrs  T(F): 97.6 (02-14-22 @ 11:42), Max: 97.7 (02-14-22 @ 04:25)  HR: 115 (02-14-22 @ 11:42) (101 - 116)  BP: 108/54 (02-14-22 @ 11:42) (86/46 - 108/54)  BP(mean): --  RR: 18 (02-14-22 @ 11:42) (18 - 18)  SpO2: 98% (02-14-22 @ 11:42) (96% - 100%)  Neck: No palpable thyroid nodules.  CVS: S1S2, No murmurs  Respiratory: No wheezing, no crepitations  GI: Abdomen soft, bowel sounds positive  Musculoskeletal:  edema lower extremities.   Skin: No skin rashes, no ecchymosis    Diagnostics               Chief complaint  Patient is a 71y old  Male who presents with a chief complaint of leg swelling (14 Feb 2022 11:55)   Review of systems  NAD, no hypoglycemic episodes.    Labs and Fingersticks  CAPILLARY BLOOD GLUCOSE      POCT Blood Glucose.: 196 mg/dL  (14 Feb 2022 12:12)  POCT Blood Glucose.: 169 mg/dL (14 Feb 2022 05:48)  POCT Blood Glucose.: 190 mg/dL (14 Feb 2022 00:32)  POCT Blood Glucose.: 166 mg/dL (13 Feb 2022 21:27)  POCT Blood Glucose.: 170 mg/dL (13 Feb 2022 17:14)      Anion Gap, Serum: 13 (02-14 @ 09:04)  Anion Gap, Serum: 15 (02-13 @ 06:33)  Anion Gap, Serum: 12 (02-12 @ 13:37)      Calcium, Total Serum: 7.9 *L* (02-14 @ 09:04)  Calcium, Total Serum: 8.0 *L* (02-13 @ 06:33)  Calcium, Total Serum: 8.0 *L* (02-12 @ 13:37)          02-14    128<L>  |  93<L>  |  93<H>  ----------------------------<  145<H>  4.3   |  22  |  3.36<H>    Ca    7.9<L>      14 Feb 2022 09:04  Phos  1.9     02-14  Mg     1.8     02-14                          6.0    16.23 )-----------( 217      ( 14 Feb 2022 09:04 )             18.2     Medications  MEDICATIONS  (STANDING):  atorvastatin 80 milliGRAM(s) Oral at bedtime  carbidopa/levodopa  25/100 2.5 Tablet(s) Oral <User Schedule>  carbidopa/levodopa  25/100 2 Tablet(s) Oral <User Schedule>  chlorhexidine 4% Liquid 1 Application(s) Topical <User Schedule>  cinacalcet 60 milliGRAM(s) Oral daily  dextrose 40% Gel 15 Gram(s) Oral once  dextrose 5%. 1000 milliLiter(s) (50 mL/Hr) IV Continuous <Continuous>  dextrose 5%. 1000 milliLiter(s) (100 mL/Hr) IV Continuous <Continuous>  dextrose 5%. 1000 milliLiter(s) (40 mL/Hr) IV Continuous <Continuous>  dextrose 50% Injectable 25 Gram(s) IV Push once  dextrose 50% Injectable 12.5 Gram(s) IV Push once  dextrose 50% Injectable 25 Gram(s) IV Push once  dextrose 50% Injectable 12.5 Gram(s) IV Push once  dextrose 50% Injectable 25 Gram(s) IV Push once  diVALproex Sprinkle 250 milliGRAM(s) Oral three times a day  DULoxetine 20 milliGRAM(s) Oral daily  folic acid 1 milliGRAM(s) Oral daily  glucagon  Injectable 1 milliGRAM(s) IntraMuscular once  insulin lispro (ADMELOG) corrective regimen sliding scale   SubCutaneous every 6 hours  levothyroxine Injectable 12.5 MICROGram(s) IV Push <User Schedule>  memantine 5 milliGRAM(s) Oral at bedtime  metoprolol tartrate Injectable 5 milliGRAM(s) IV Push every 6 hours  midodrine 10 milliGRAM(s) Oral <User Schedule>  mirtazapine 7.5 milliGRAM(s) Oral daily  Nephro-celeste 1 Tablet(s) Oral daily  pantoprazole Infusion 8 mG/Hr (10 mL/Hr) IV Continuous <Continuous>  QUEtiapine 50 milliGRAM(s) Oral at bedtime  tamsulosin 0.4 milliGRAM(s) Oral at bedtime  trihexyphenidyl 2 milliGRAM(s) Oral three times a day  vancomycin    Solution 125 milliGRAM(s) Oral every 6 hours      Physical Exam  General: Patient comfortable in bed  Vital Signs Last 12 Hrs  T(F): 97.6 (02-14-22 @ 11:42), Max: 97.7 (02-14-22 @ 04:25)  HR: 115 (02-14-22 @ 11:42) (101 - 116)  BP: 108/54 (02-14-22 @ 11:42) (86/46 - 108/54)  BP(mean): --  RR: 18 (02-14-22 @ 11:42) (18 - 18)  SpO2: 98% (02-14-22 @ 11:42) (96% - 100%)  Neck: No palpable thyroid nodules.  CVS: S1S2, No murmurs  Respiratory: No wheezing, no crepitations  GI: Abdomen soft, bowel sounds positive  Musculoskeletal:  edema lower extremities.   Skin: No skin rashes, no ecchymosis    Diagnostics             Chief complaint  Patient is a 71y old  Male who presents with a chief complaint of leg swelling (14 Feb 2022 11:55)   Review of systems  NAD, no hypoglycemic episodes.    Labs and Fingersticks  CAPILLARY BLOOD GLUCOSE      POCT Blood Glucose.: 196 mg/dL  (14 Feb 2022 12:12)  POCT Blood Glucose.: 169 mg/dL (14 Feb 2022 05:48)  POCT Blood Glucose.: 190 mg/dL (14 Feb 2022 00:32)  POCT Blood Glucose.: 166 mg/dL (13 Feb 2022 21:27)  POCT Blood Glucose.: 170 mg/dL (13 Feb 2022 17:14)      Anion Gap, Serum: 13 (02-14 @ 09:04)  Anion Gap, Serum: 15 (02-13 @ 06:33)  Anion Gap, Serum: 12 (02-12 @ 13:37)      Calcium, Total Serum: 7.9 *L* (02-14 @ 09:04)  Calcium, Total Serum: 8.0 *L* (02-13 @ 06:33)  Calcium, Total Serum: 8.0 *L* (02-12 @ 13:37)          02-14    128<L>  |  93<L>  |  93<H>  ----------------------------<  145<H>  4.3   |  22  |  3.36<H>    Ca    7.9<L>      14 Feb 2022 09:04  Phos  1.9     02-14  Mg     1.8     02-14                          6.0    16.23 )-----------( 217      ( 14 Feb 2022 09:04 )             18.2     Medications  MEDICATIONS  (STANDING):  atorvastatin 80 milliGRAM(s) Oral at bedtime  carbidopa/levodopa  25/100 2.5 Tablet(s) Oral <User Schedule>  carbidopa/levodopa  25/100 2 Tablet(s) Oral <User Schedule>  chlorhexidine 4% Liquid 1 Application(s) Topical <User Schedule>  cinacalcet 60 milliGRAM(s) Oral daily  dextrose 40% Gel 15 Gram(s) Oral once  dextrose 5%. 1000 milliLiter(s) (50 mL/Hr) IV Continuous <Continuous>  dextrose 5%. 1000 milliLiter(s) (100 mL/Hr) IV Continuous <Continuous>  dextrose 5%. 1000 milliLiter(s) (40 mL/Hr) IV Continuous <Continuous>  dextrose 50% Injectable 25 Gram(s) IV Push once  dextrose 50% Injectable 12.5 Gram(s) IV Push once  dextrose 50% Injectable 25 Gram(s) IV Push once  dextrose 50% Injectable 12.5 Gram(s) IV Push once  dextrose 50% Injectable 25 Gram(s) IV Push once  diVALproex Sprinkle 250 milliGRAM(s) Oral three times a day  DULoxetine 20 milliGRAM(s) Oral daily  folic acid 1 milliGRAM(s) Oral daily  glucagon  Injectable 1 milliGRAM(s) IntraMuscular once  insulin lispro (ADMELOG) corrective regimen sliding scale   SubCutaneous every 6 hours  levothyroxine Injectable 12.5 MICROGram(s) IV Push <User Schedule>  memantine 5 milliGRAM(s) Oral at bedtime  metoprolol tartrate Injectable 5 milliGRAM(s) IV Push every 6 hours  midodrine 10 milliGRAM(s) Oral <User Schedule>  mirtazapine 7.5 milliGRAM(s) Oral daily  Nephro-celeste 1 Tablet(s) Oral daily  pantoprazole Infusion 8 mG/Hr (10 mL/Hr) IV Continuous <Continuous>  QUEtiapine 50 milliGRAM(s) Oral at bedtime  tamsulosin 0.4 milliGRAM(s) Oral at bedtime  trihexyphenidyl 2 milliGRAM(s) Oral three times a day  vancomycin    Solution 125 milliGRAM(s) Oral every 6 hours      Physical Exam  General: Patient comfortable in bed  Vital Signs Last 12 Hrs  T(F): 97.6 (02-14-22 @ 11:42), Max: 97.7 (02-14-22 @ 04:25)  HR: 115 (02-14-22 @ 11:42) (101 - 116)  BP: 108/54 (02-14-22 @ 11:42) (86/46 - 108/54)  BP(mean): --  RR: 18 (02-14-22 @ 11:42) (18 - 18)  SpO2: 98% (02-14-22 @ 11:42) (96% - 100%)  Neck: No palpable thyroid nodules.  CVS: S1S2, No murmurs  Respiratory: No wheezing, no crepitations  GI: Abdomen soft, bowel sounds positive  Musculoskeletal:  edema lower extremities.   Skin: No skin rashes, no ecchymosis    Diagnostics

## 2022-02-14 NOTE — PROGRESS NOTE ADULT - ASSESSMENT
71 M w volume overload, GI consulted for drop in hgb    1. CHF per cardio    2. ABBY on CKD per renal  -on HD via permacath per renal, midodrine during dialysis  -AVF not functional   -s/p tunnelled HD catheter insertion by IR 2/10    3. RP bleed  -s/p Abdominal Angiography and Embolization on 10/29/2021 by Interventional radiology of l4and l5 lumbar artery     4. Failure to thrive  -s/p PEG 1/10    -patient pulled out PEG  -IR replaced PEG 2/10  - tolerating feeds    5. stress duodenal ulcers  -PPI BID    6. Constipation  -resolved  -continue Miralax BID  -DC planning to rehab underway     7. Melena, hgb dropping  -episode of melena overnight  -previous endoscopy showed duodenal ulcers   -hgb 6.0 today, transfusion ordered  -continue protonix gtt  -possible EGD pending clinical course  -called family to give update; left voicemail    8. C-diff  -on [facility]  Gastroenterology and Hepatology  [address] [place], [place]  Office: [phone]  Cell: [phone]   71 M w volume overload, GI consulted for drop in hgb    1. CHF per cardio    2. ABBY on CKD per renal  -on HD via permacath per renal, midodrine during dialysis  -AVF not functional   -s/p tunnelled HD catheter insertion by IR 2/10    3. RP bleed  -s/p Abdominal Angiography and Embolization on 10/29/2021 by Interventional radiology of l4and l5 lumbar artery     4. Failure to thrive  -s/p PEG 1/10    -patient pulled out PEG  -IR replaced PEG 2/10  - tolerating feeds    5. stress duodenal ulcers  -PPI BID    6. Constipation  -resolved  -continue Miralax BID  -DC planning to rehab underway     7. Melena, hgb dropping  -episode of melena overnight  -previous endoscopy showed duodenal ulcers   -hgb 6.0 today, transfusion ordered  -continue protonix gtt  -possible EGD pending clinical course  -called family to give update; left voicemail    8. C-diff  -on vanco     Attending supervision statement: I have personally seen and examined the patient. I fully participated in the care of this patient. I have made amendments to the documentation where necessary, and agree with the history, physical exam, and plan as outlined by the ACP.      [facility]  Gastroenterology and Hepatology  [address] [place], [place]  Office: [phone]  Cell: [phone]

## 2022-02-14 NOTE — PROGRESS NOTE ADULT - ASSESSMENT
72yo M w/ PMHx of CAD (s/p CABG 2019), CKD (unknown stage), DM2, Parkinson's Disease, HTN, depression presents with new onset acute heart failure exacerbation, NSTEMI, started on hep gtt, developed bl RP bleed, acute anemia. sp MICU course for hemorrhagic shock, 10/28 sp IR embolization l4 and l5 lumbar artery. sp permacath and AVF.    # chronic systolic and diastolic heart failure  # ESRD, new HD  # Atrial flutter  # Parkinson's disease   # delirium  # CAD (coronary artery disease)  # HTN  # DM2 (diabetes mellitus, type 2)  # FTT sp PEG  # Cdiff colitis  #GIbleed  midodrine during dialysis  sp L AVF using for HD,   1/29 malfunctioning Lt AVF - 2/2 sp fistulogram and angioplasty by IR - nonfunctioning  sp shiley 2/4, sp permacath 2/10  afib rvr as missed PO medication, cont lopressor, atorvastatin - can give by mouth now that no PEG, PEG not for dysphagia     2/6 pt pulled out peg tube  pt remains on mittens  psych reconsulted-returning to the dose of the Seroquel  to 50mg qhs and prns of Seroquel 12.5mg q6hrs prn agitation.  Depakote 250mg TID monitor PLT/LFTPRN: Depacon 125mg IV q8h PRN agitation, Ativan 0.25mg PO q8h PRN SEVERE agitation only     sp permacath and PEG placement 2/10  son wish all aggressive measures  palliative reconsult appreciated    # GI bleed/melena  transfuse 2uprbc, ppI gtt  hb 6.0, 2 more units prbc today   ( hx of duodenal ulcers)  GI cs-plan for EGD pending course    #C-diff- cont vanco po  monitor BM    DVT ppx hsq    DNR/DNI    [facility]  [phone]

## 2022-02-14 NOTE — PROGRESS NOTE ADULT - SUBJECTIVE AND OBJECTIVE BOX
Patient is a 71y old  Male who presents with a chief complaint of leg swelling (14 Feb 2022 15:43)      INTERVAL HPI/OVERNIGHT EVENTS: noted  pt seen and examined this am   events noted  +diarrhea      Vital Signs Last 24 Hrs  T(C): 36.4 (14 Feb 2022 11:42), Max: 36.5 (14 Feb 2022 04:25)  T(F): 97.6 (14 Feb 2022 11:42), Max: 97.7 (14 Feb 2022 04:25)  HR: 112 (14 Feb 2022 18:48) (101 - 116)  BP: 102/66 (14 Feb 2022 18:48) (86/46 - 108/54)  BP(mean): --  RR: 18 (14 Feb 2022 11:42) (18 - 18)  SpO2: 98% (14 Feb 2022 11:42) (94% - 100%)    acetaminophen     Tablet .. 650 milliGRAM(s) Oral every 6 hours PRN  albuterol/ipratropium for Nebulization 3 milliLiter(s) Nebulizer every 6 hours PRN  atorvastatin 80 milliGRAM(s) Oral at bedtime  carbidopa/levodopa  25/100 2.5 Tablet(s) Oral <User Schedule>  carbidopa/levodopa  25/100 2 Tablet(s) Oral <User Schedule>  chlorhexidine 4% Liquid 1 Application(s) Topical <User Schedule>  cinacalcet 60 milliGRAM(s) Oral daily  dextrose 40% Gel 15 Gram(s) Oral once  dextrose 5%. 1000 milliLiter(s) IV Continuous <Continuous>  dextrose 5%. 1000 milliLiter(s) IV Continuous <Continuous>  dextrose 5%. 1000 milliLiter(s) IV Continuous <Continuous>  dextrose 50% Injectable 25 Gram(s) IV Push once  dextrose 50% Injectable 12.5 Gram(s) IV Push once  dextrose 50% Injectable 25 Gram(s) IV Push once  dextrose 50% Injectable 12.5 Gram(s) IV Push once  dextrose 50% Injectable 25 Gram(s) IV Push once  diVALproex Sprinkle 125 milliGRAM(s) Oral every 8 hours PRN  diVALproex Sprinkle 250 milliGRAM(s) Oral three times a day  DULoxetine 20 milliGRAM(s) Oral daily  folic acid 1 milliGRAM(s) Oral daily  glucagon  Injectable 1 milliGRAM(s) IntraMuscular once  guaiFENesin Oral Liquid (Sugar-Free) 200 milliGRAM(s) Oral every 6 hours PRN  insulin lispro (ADMELOG) corrective regimen sliding scale   SubCutaneous every 6 hours  levothyroxine Injectable 20 MICROGram(s) IV Push at bedtime  memantine 5 milliGRAM(s) Oral at bedtime  metoprolol tartrate Injectable 5 milliGRAM(s) IV Push every 6 hours  midodrine 10 milliGRAM(s) Oral <User Schedule>  mirtazapine 7.5 milliGRAM(s) Oral daily  Nephro-celeste 1 Tablet(s) Oral daily  pantoprazole Infusion 8 mG/Hr IV Continuous <Continuous>  QUEtiapine 50 milliGRAM(s) Oral at bedtime  QUEtiapine 12.5 milliGRAM(s) Oral every 6 hours PRN  sodium chloride 0.9% lock flush 10 milliLiter(s) IV Push every 1 hour PRN  tamsulosin 0.4 milliGRAM(s) Oral at bedtime  trihexyphenidyl 2 milliGRAM(s) Oral three times a day  vancomycin    Solution 125 milliGRAM(s) Oral every 6 hours      PHYSICAL EXAM:  GENERAL: NAD,   EYES: conjunctiva and sclera clear  ENMT: Moist mucous membranes  NECK: Supple, No JVD, Normal thyroid  CHEST/LUNG: non labored, cta b/l  HEART: Regular rate and rhythm; No murmurs, rubs, or gallops  ABDOMEN: Soft, Nontender, Nondistended; Bowel sounds present  EXTREMITIES:  2+ Peripheral Pulses, No clubbing, cyanosis, or edema  LYMPH: No lymphadenopathy noted  SKIN: No rashes or lesions    Consultant(s) Notes Reviewed:  [x ] YES  [ ] NO  Care Discussed with Consultants/Other Providers [ x] YES  [ ] NO    LABS:                        7.1    16.53 )-----------( 231      ( 14 Feb 2022 17:16 )             21.1     02-14    128<L>  |  93<L>  |  93<H>  ----------------------------<  145<H>  4.3   |  22  |  3.36<H>    Ca    7.9<L>      14 Feb 2022 09:04  Phos  1.9     02-14  Mg     1.8     02-14          CAPILLARY BLOOD GLUCOSE      POCT Blood Glucose.: 182 mg/dL (14 Feb 2022 19:00)  POCT Blood Glucose.: 196 mg/dL (14 Feb 2022 12:12)  POCT Blood Glucose.: 169 mg/dL (14 Feb 2022 05:48)  POCT Blood Glucose.: 190 mg/dL (14 Feb 2022 00:32)  POCT Blood Glucose.: 166 mg/dL (13 Feb 2022 21:27)              RADIOLOGY & ADDITIONAL TESTS:    Imaging Personally Reviewed:  [x ] YES  [ ] NO

## 2022-02-14 NOTE — CHART NOTE - NSCHARTNOTEFT_GEN_A_CORE
TSH noted to be 8.6 today (previously 2.82 in Jan 2022).   Spoke with Dr. Reece, endocrinologist, to relay lab value.   Per attending, he will review the chart.

## 2022-02-15 NOTE — PROGRESS NOTE ADULT - SUBJECTIVE AND OBJECTIVE BOX
Chief Complaint:  Patient is a 71y old  Male who presents with a chief complaint of leg swelling (15 Feb 2022 12:25)      Date of service 02-15-22 @ 12:44      Interval Events:   Patient seen and examined.   HGB today 5.1.   No melena, brbpr  Small brown BM as per nursing staff.  Plan for endoscopy tomorrow      [facility] Medications:  acetaminophen     Tablet .. 650 milliGRAM(s) Oral every 6 hours PRN  albuterol/ipratropium for Nebulization 3 milliLiter(s) Nebulizer every 6 hours PRN  atorvastatin 80 milliGRAM(s) Oral at bedtime  carbidopa/levodopa  25/100 2.5 Tablet(s) Oral <User Schedule>  carbidopa/levodopa  25/100 2 Tablet(s) Oral <User Schedule>  chlorhexidine 4% Liquid 1 Application(s) Topical <User Schedule>  cinacalcet 60 milliGRAM(s) Oral daily  dextrose 40% Gel 15 Gram(s) Oral once  dextrose 5%. 1000 milliLiter(s) IV Continuous <Continuous>  dextrose 5%. 1000 milliLiter(s) IV Continuous <Continuous>  dextrose 5%. 1000 milliLiter(s) IV Continuous <Continuous>  dextrose 50% Injectable 25 Gram(s) IV Push once  dextrose 50% Injectable 25 Gram(s) IV Push once  dextrose 50% Injectable 12.5 Gram(s) IV Push once  dextrose 50% Injectable 25 Gram(s) IV Push once  diVALproex Sprinkle 125 milliGRAM(s) Oral every 8 hours PRN  diVALproex Sprinkle 250 milliGRAM(s) Oral three times a day  DULoxetine 20 milliGRAM(s) Oral daily  folic acid 1 milliGRAM(s) Oral daily  glucagon  Injectable 1 milliGRAM(s) IntraMuscular once  guaiFENesin Oral Liquid (Sugar-Free) 200 milliGRAM(s) Oral every 6 hours PRN  insulin lispro (ADMELOG) corrective regimen sliding scale   SubCutaneous every 6 hours  levothyroxine Injectable 20 MICROGram(s) IV Push at bedtime  memantine 5 milliGRAM(s) Oral at bedtime  metoprolol tartrate Injectable 5 milliGRAM(s) IV Push every 6 hours  midodrine 10 milliGRAM(s) Oral <User Schedule>  mirtazapine 7.5 milliGRAM(s) Oral daily  Nephro-celeste 1 Tablet(s) Oral daily  pantoprazole Infusion 8 mG/Hr IV Continuous <Continuous>  QUEtiapine 50 milliGRAM(s) Oral at bedtime  QUEtiapine 12.5 milliGRAM(s) Oral every 6 hours PRN  sodium chloride 0.9% lock flush 10 milliLiter(s) IV Push every 1 hour PRN  tamsulosin 0.4 milliGRAM(s) Oral at bedtime  trihexyphenidyl 2 milliGRAM(s) Oral three times a day  vancomycin    Solution 125 milliGRAM(s) Oral every 6 hours        Review of Systems:  General:  No wt loss, fevers, chills, night sweats, fatigue,   Eyes:  Good vision, no reported pain  ENT:  No sore throat, pain, runny nose, dysphagia  CV:  No pain, palpitations, hypo/hypertension  Resp:  No dyspnea, cough, tachypnea, wheezing  GI:  See HPI  :  No pain, bleeding, incontinence, nocturia  Muscle:  No pain, weakness  Neuro:  No weakness, tingling, memory problems  Psych:  No fatigue, insomnia, mood problems, depression  Endocrine:  No polyuria, polydipsia, cold/heat intolerance  Heme:  No petechiae, ecchymosis, easy bruisability  Integumentary:  No rash, edema    PHYSICAL EXAM:   Vital Signs:  Vital Signs Last 24 Hrs  T(C): 37.2 (15 Feb 2022 11:48), Max: 37.2 (15 Feb 2022 11:48)  T(F): 99 (15 Feb 2022 11:48), Max: 99 (15 Feb 2022 11:48)  HR: 102 (15 Feb 2022 11:48) (61 - 112)  BP: 111/72 (15 Feb 2022 11:48) (92/66 - 111/72)  BP(mean): --  RR: 18 (15 Feb 2022 11:48) (18 - 18)  SpO2: 100% (15 Feb 2022 11:48) (98% - 100%)  Daily     Daily Weight in k (15 Feb 2022 11:48)      PHYSICAL EXAM:     GENERAL:  Appears stated age, well-groomed, well-nourished, no distress  HEENT:  NC/AT,  conjunctivae anicteric, clear and pink,   NECK: supple, trachea midline  CHEST:  Full & symmetric excursion, no increased effort, breath sounds clear  HEART:  Regular rhythm, no JVD  ABDOMEN:  Soft, non-tender, non-distended, normoactive bowel sounds,  no masses , no hepatosplenomegaly  EXTREMITIES:  no cyanosis,clubbing or edema  SKIN:  No rash, erythema, or, ecchymoses, no jaundice  NEURO:  Alert, non-focal, no asterixis  PSYCH: Appropriate affect, oriented to place and time  RECTAL: Deferred      LABS Personally reviewed by me:                        5.1    12.52 )-----------( 184      ( 15 Feb 2022 10:18 )             15.4     Mean Cell Volume: 98.1 fl (02-15-22 @ 10:18)    02-15    130<L>  |  93<L>  |  104<H>  ----------------------------<  129<H>  4.3   |  23  |  3.77<H>    Ca    7.7<L>      15 Feb 2022 10:18  Phos  1.9     02-14  Mg     1.8     02-14                                    5.1    12.52 )-----------( 184      ( 15 Feb 2022 10:18 )             15.4                         7.1    16.53 )-----------( 231      ( 2022 17:16 )             21.1                         6.0    16.23 )-----------( 217      ( 2022 09:04 )             18.2                         5.1    18.83 )-----------( 248      ( 2022 11:57 )             16.6                         5.9    19.54 )-----------( 237      ( 2022 06:34 )             19.8       Imaging personally reviewed by me:             Chief Complaint:  Patient is a 71y old  Male who presents with a chief complaint of leg swelling (15 Feb 2022 12:25)      Date of service 02-15-22 @ 12:44      Interval Events:   Patient seen and examined.   HGB today 5.1.   Melena noted on rectal exam  Plan for endoscopy tomorrow      [facility]ospital Medications:  acetaminophen     Tablet .. 650 milliGRAM(s) Oral every 6 hours PRN  albuterol/ipratropium for Nebulization 3 milliLiter(s) Nebulizer every 6 hours PRN  atorvastatin 80 milliGRAM(s) Oral at bedtime  carbidopa/levodopa  25/100 2.5 Tablet(s) Oral <User Schedule>  carbidopa/levodopa  25/100 2 Tablet(s) Oral <User Schedule>  chlorhexidine 4% Liquid 1 Application(s) Topical <User Schedule>  cinacalcet 60 milliGRAM(s) Oral daily  dextrose 40% Gel 15 Gram(s) Oral once  dextrose 5%. 1000 milliLiter(s) IV Continuous <Continuous>  dextrose 5%. 1000 milliLiter(s) IV Continuous <Continuous>  dextrose 5%. 1000 milliLiter(s) IV Continuous <Continuous>  dextrose 50% Injectable 25 Gram(s) IV Push once  dextrose 50% Injectable 25 Gram(s) IV Push once  dextrose 50% Injectable 12.5 Gram(s) IV Push once  dextrose 50% Injectable 25 Gram(s) IV Push once  diVALproex Sprinkle 125 milliGRAM(s) Oral every 8 hours PRN  diVALproex Sprinkle 250 milliGRAM(s) Oral three times a day  DULoxetine 20 milliGRAM(s) Oral daily  folic acid 1 milliGRAM(s) Oral daily  glucagon  Injectable 1 milliGRAM(s) IntraMuscular once  guaiFENesin Oral Liquid (Sugar-Free) 200 milliGRAM(s) Oral every 6 hours PRN  insulin lispro (ADMELOG) corrective regimen sliding scale   SubCutaneous every 6 hours  levothyroxine Injectable 20 MICROGram(s) IV Push at bedtime  memantine 5 milliGRAM(s) Oral at bedtime  metoprolol tartrate Injectable 5 milliGRAM(s) IV Push every 6 hours  midodrine 10 milliGRAM(s) Oral <User Schedule>  mirtazapine 7.5 milliGRAM(s) Oral daily  Nephro-celeste 1 Tablet(s) Oral daily  pantoprazole Infusion 8 mG/Hr IV Continuous <Continuous>  QUEtiapine 50 milliGRAM(s) Oral at bedtime  QUEtiapine 12.5 milliGRAM(s) Oral every 6 hours PRN  sodium chloride 0.9% lock flush 10 milliLiter(s) IV Push every 1 hour PRN  tamsulosin 0.4 milliGRAM(s) Oral at bedtime  trihexyphenidyl 2 milliGRAM(s) Oral three times a day  vancomycin    Solution 125 milliGRAM(s) Oral every 6 hours        Review of Systems:  General:  No wt loss, fevers, chills, night sweats, fatigue,   Eyes:  Good vision, no reported pain  ENT:  No sore throat, pain, runny nose, dysphagia  CV:  No pain, palpitations, hypo/hypertension  Resp:  No dyspnea, cough, tachypnea, wheezing  GI:  See HPI  :  No pain, bleeding, incontinence, nocturia  Muscle:  No pain, weakness  Neuro:  No weakness, tingling, memory problems  Psych:  No fatigue, insomnia, mood problems, depression  Endocrine:  No polyuria, polydipsia, cold/heat intolerance  Heme:  No petechiae, ecchymosis, easy bruisability  Integumentary:  No rash, edema    PHYSICAL EXAM:   Vital Signs:  Vital Signs Last 24 Hrs  T(C): 37.2 (15 Feb 2022 11:48), Max: 37.2 (15 Feb 2022 11:48)  T(F): 99 (15 Feb 2022 11:48), Max: 99 (15 Feb 2022 11:48)  HR: 102 (15 Feb 2022 11:48) (61 - 112)  BP: 111/72 (15 Feb 2022 11:48) (92/66 - 111/72)  BP(mean): --  RR: 18 (15 Feb 2022 11:48) (18 - 18)  SpO2: 100% (15 Feb 2022 11:48) (98% - 100%)  Daily     Daily Weight in k (15 Feb 2022 11:48)      PHYSICAL EXAM:     GENERAL:  Appears stated age, well-groomed, well-nourished, no distress  HEENT:  NC/AT,  conjunctivae anicteric, clear and pink,   NECK: supple, trachea midline  CHEST:  Full & symmetric excursion, no increased effort, breath sounds clear  HEART:  Regular rhythm, no JVD  ABDOMEN:  Soft, non-tender, non-distended, normoactive bowel sounds,  no masses , no hepatosplenomegaly, +gastrostomy   EXTREMITIES:  no cyanosis,clubbing or edema  SKIN:  No rash, erythema, or, ecchymoses, no jaundice  NEURO:  Alert, non-focal, no asterixis  PSYCH: Appropriate affect, oriented to place and time  RECTAL: Deferred      LABS Personally reviewed by me:                        5.1    1252 )-----------( 184      ( 15 Feb 2022 10:18 )             15.4     Mean Cell Volume: 98.1 fl (02-15-22 @ 10:18)    -15    130<L>  |  93<L>  |  104<H>  ----------------------------<  129<H>  4.3   |  23  |  3.77<H>    Ca    7.7<L>      15 Feb 2022 10:18  Phos  1.9     02-14  Mg     1.8     02-14                                    5.1    12.52 )-----------( 184      ( 15 Feb 2022 10:18 )             15.4                         7.1    16.53 )-----------( 231      ( 2022 17:16 )             21.1                         6.0    16.23 )-----------( 217      ( 2022 09:04 )             18.2                         5.1    18.83 )-----------( 248      ( 2022 11:57 )             16.6                         5.9    19.54 )-----------( 237      ( 2022 06:34 )             19.8       Imaging personally reviewed by me:             Chief Complaint:  Patient is a 71y old  Male who presents with a chief complaint of leg swelling (15 Feb 2022 12:25)      Date of service 02-15-22 @ 12:44      Interval Events:   Patient seen and examined.   HGB today 5.1. Vital signs stable.   Melena noted on rectal exam  Plan for endoscopy tomorrow      [facility] Medications:  acetaminophen     Tablet .. 650 milliGRAM(s) Oral every 6 hours PRN  albuterol/ipratropium for Nebulization 3 milliLiter(s) Nebulizer every 6 hours PRN  atorvastatin 80 milliGRAM(s) Oral at bedtime  carbidopa/levodopa  25/100 2.5 Tablet(s) Oral <User Schedule>  carbidopa/levodopa  25/100 2 Tablet(s) Oral <User Schedule>  chlorhexidine 4% Liquid 1 Application(s) Topical <User Schedule>  cinacalcet 60 milliGRAM(s) Oral daily  dextrose 40% Gel 15 Gram(s) Oral once  dextrose 5%. 1000 milliLiter(s) IV Continuous <Continuous>  dextrose 5%. 1000 milliLiter(s) IV Continuous <Continuous>  dextrose 5%. 1000 milliLiter(s) IV Continuous <Continuous>  dextrose 50% Injectable 25 Gram(s) IV Push once  dextrose 50% Injectable 25 Gram(s) IV Push once  dextrose 50% Injectable 12.5 Gram(s) IV Push once  dextrose 50% Injectable 25 Gram(s) IV Push once  diVALproex Sprinkle 125 milliGRAM(s) Oral every 8 hours PRN  diVALproex Sprinkle 250 milliGRAM(s) Oral three times a day  DULoxetine 20 milliGRAM(s) Oral daily  folic acid 1 milliGRAM(s) Oral daily  glucagon  Injectable 1 milliGRAM(s) IntraMuscular once  guaiFENesin Oral Liquid (Sugar-Free) 200 milliGRAM(s) Oral every 6 hours PRN  insulin lispro (ADMELOG) corrective regimen sliding scale   SubCutaneous every 6 hours  levothyroxine Injectable 20 MICROGram(s) IV Push at bedtime  memantine 5 milliGRAM(s) Oral at bedtime  metoprolol tartrate Injectable 5 milliGRAM(s) IV Push every 6 hours  midodrine 10 milliGRAM(s) Oral <User Schedule>  mirtazapine 7.5 milliGRAM(s) Oral daily  Nephro-celeste 1 Tablet(s) Oral daily  pantoprazole Infusion 8 mG/Hr IV Continuous <Continuous>  QUEtiapine 50 milliGRAM(s) Oral at bedtime  QUEtiapine 12.5 milliGRAM(s) Oral every 6 hours PRN  sodium chloride 0.9% lock flush 10 milliLiter(s) IV Push every 1 hour PRN  tamsulosin 0.4 milliGRAM(s) Oral at bedtime  trihexyphenidyl 2 milliGRAM(s) Oral three times a day  vancomycin    Solution 125 milliGRAM(s) Oral every 6 hours        Review of Systems:  General:  No wt loss, fevers, chills, night sweats, fatigue,   Eyes:  Good vision, no reported pain  ENT:  No sore throat, pain, runny nose, dysphagia  CV:  No pain, palpitations, hypo/hypertension  Resp:  No dyspnea, cough, tachypnea, wheezing  GI:  See HPI  :  No pain, bleeding, incontinence, nocturia  Muscle:  No pain, weakness  Neuro:  No weakness, tingling, memory problems  Psych:  No fatigue, insomnia, mood problems, depression  Endocrine:  No polyuria, polydipsia, cold/heat intolerance  Heme:  No petechiae, ecchymosis, easy bruisability  Integumentary:  No rash, edema    PHYSICAL EXAM:   Vital Signs:  Vital Signs Last 24 Hrs  T(C): 37.2 (15 Feb 2022 11:48), Max: 37.2 (15 Feb 2022 11:48)  T(F): 99 (15 Feb 2022 11:48), Max: 99 (15 Feb 2022 11:48)  HR: 102 (15 Feb 2022 11:48) (61 - 112)  BP: 111/72 (15 Feb 2022 11:48) (92/66 - 111/72)  BP(mean): --  RR: 18 (15 Feb 2022 11:48) (18 - 18)  SpO2: 100% (15 Feb 2022 11:48) (98% - 100%)  Daily     Daily Weight in k (15 Feb 2022 11:48)      PHYSICAL EXAM:     GENERAL:  Appears stated age, well-groomed, well-nourished, no distress  HEENT:  NC/AT,  conjunctivae anicteric, clear and pink,   NECK: supple, trachea midline  CHEST:  Full & symmetric excursion, no increased effort, breath sounds clear  HEART:  Regular rhythm, no JVD  ABDOMEN:  Soft, non-tender, non-distended, normoactive bowel sounds,  no masses , no hepatosplenomegaly, +gastrostomy   EXTREMITIES:  no cyanosis,clubbing or edema  SKIN:  No rash, erythema, or, ecchymoses, no jaundice  NEURO:  Alert, non-focal, no asterixis  PSYCH: Appropriate affect, oriented to place and time  RECTAL: Deferred      LABS Personally reviewed by me:                        5.1    12.52 )-----------( 184      ( 15 Feb 2022 10:18 )             15.4     Mean Cell Volume: 98.1 fl (02-15-22 @ 10:18)    02-15    130<L>  |  93<L>  |  104<H>  ----------------------------<  129<H>  4.3   |  23  |  3.77<H>    Ca    7.7<L>      15 Feb 2022 10:18  Phos  1.9     02-14  Mg     1.8     02-14                                    5.1    12.52 )-----------( 184      ( 15 Feb 2022 10:18 )             15.4                         7.1    16.53 )-----------( 231      ( 2022 17:16 )             21.1                         6.0    16.23 )-----------( 217      ( 2022 09:04 )             18.2                         5.1    18.83 )-----------( 248      ( 2022 11:57 )             16.6                         5.9    19.54 )-----------( 237      ( 2022 06:34 )             19.8       Imaging personally reviewed by me:

## 2022-02-15 NOTE — PROGRESS NOTE ADULT - PROBLEM SELECTOR PLAN 3
Will continue synthroid 20 mcg IV daily for now.  Will continue monitoring TFTs and FU.  Suggest to FU outpatient, repeat TFTs in 4-6 weeks.

## 2022-02-15 NOTE — PROGRESS NOTE ADULT - ASSESSMENT
70yo M w/ PMHx of CAD (s/p CABG 2019), CKD (unknown stage), DM2, Parkinson's Disease, HTN, depression presents with new onset acute heart failure exacerbation, NSTEMI, started on hep gtt, developed bl RP bleed, acute anemia. sp MICU course for hemorrhagic shock, 10/28 sp IR embolization l4 and l5 lumbar artery. sp permacath and AVF.    # chronic systolic and diastolic heart failure  # ESRD, new HD  # Atrial flutter  # Parkinson's disease   # delirium  # CAD (coronary artery disease)  # HTN  # DM2 (diabetes mellitus, type 2)  # FTT sp PEG  # Cdiff colitis  #GIbleed  midodrine during dialysis  sp L AVF using for HD,   1/29 malfunctioning Lt AVF - 2/2 sp fistulogram and angioplasty by IR - nonfunctioning  sp shiley 2/4, sp permacath 2/10  afib rvr as missed PO medication, cont lopressor, atorvastatin - can give by mouth now that no PEG, PEG not for dysphagia     2/6 pt pulled out peg tube  pt remains on mittens  psych reconsulted-returning to the dose of the Seroquel  to 50mg qhs and prns of Seroquel 12.5mg q6hrs prn agitation.  Depakote 250mg TID monitor PLT/LFTPRN: Depacon 125mg IV q8h PRN agitation, Ativan 0.25mg PO q8h PRN SEVERE agitation only     sp permacath and PEG placement 2/10  son wish all aggressive measures  palliative reconsult appreciated    # GI bleed/melena  transfuse 2uprbc, ppI gtt  hb 6.0, 2 more units prbc today  (hx of duodenal ulcers)  GI cs-plan for EGD pending course    #C-diff- cont vanco po  monitor BM    DVT ppx hsq    DNR/DNI    [facility]  [phone]     70yo M w/ PMHx of CAD (s/p CABG 2019), CKD (unknown stage), DM2, Parkinson's Disease, HTN, depression presents with new onset acute heart failure exacerbation, NSTEMI, started on hep gtt, developed bl RP bleed, acute anemia. sp MICU course for hemorrhagic shock, 10/28 sp IR embolization l4 and l5 lumbar artery. sp permacath and AVF.    # chronic systolic and diastolic heart failure  # ESRD, new HD  # Atrial flutter  # Parkinson's disease   # delirium  # CAD (coronary artery disease)  # HTN  # DM2 (diabetes mellitus, type 2)  # FTT sp PEG  # Cdiff colitis  #GIbleed  midodrine during dialysis  sp L AVF using for HD,   1/29 malfunctioning Lt AVF - 2/2 sp fistulogram and angioplasty by IR - nonfunctioning  sp shiley 2/4, sp permacath 2/10  afib rvr as missed PO medication, cont lopressor, atorvastatin - can give by mouth now that no PEG, PEG not for dysphagia     2/6 pt pulled out peg tube  pt remains on mittens  psych reconsulted-returning to the dose of the Seroquel  to 50mg qhs and prns of Seroquel 12.5mg q6hrs prn agitation.  Depakote 250mg TID monitor PLT/LFTPRN: Depacon 125mg IV q8h PRN agitation, Ativan 0.25mg PO q8h PRN SEVERE agitation only     sp permacath and PEG placement 2/10  son wish all aggressive measures  palliative reconsult appreciated    # GI bleed/melena  transfuse 2uprbc, ppI gtt  hb 6.0, 2 more units prbc today  (hx of duodenal ulcers)  Plan for EGD tomorrow  GI following    #C-diff- cont vanco po  monitor BM    DVT ppx hsq    DNR/DNI    [facility]  [phone]

## 2022-02-15 NOTE — PROGRESS NOTE ADULT - ATTENDING COMMENTS
#GI bleed/melena  hb 7.9 sp 4u prbc total, PPI gtt  (hx of duodenal ulcers)  Plan for EGD tomorrow  GI following    #C-diff- cont vanco po  monitor [facility]  [phone]

## 2022-02-15 NOTE — PROGRESS NOTE ADULT - SUBJECTIVE AND OBJECTIVE BOX
SUBJECTIVE / OVERNIGHT EVENTS:    no events overnight  patient seen and examined  monitor CBC    --------------------------------------------------------------------------------------------  LABS:                        5.1    12.52 )-----------( 184      ( 15 Feb 2022 10:18 )             15.4     02-15    130<L>  |  93<L>  |  104<H>  ----------------------------<  129<H>  4.3   |  23  |  3.77<H>    Ca    7.7<L>      15 Feb 2022 10:18  Phos  1.9     02-14  Mg     1.8     02-14        CAPILLARY BLOOD GLUCOSE      POCT Blood Glucose.: 153 mg/dL (15 Feb 2022 05:41)  POCT Blood Glucose.: 160 mg/dL (15 Feb 2022 00:23)  POCT Blood Glucose.: 182 mg/dL (14 Feb 2022 19:00)            RADIOLOGY & ADDITIONAL TESTS:    Imaging Personally Reviewed:  [x] YES  [ ] NO    Consultant(s) Notes Reviewed:  [x] YES  [ ] NO    MEDICATIONS  (STANDING):  atorvastatin 80 milliGRAM(s) Oral at bedtime  carbidopa/levodopa  25/100 2.5 Tablet(s) Oral <User Schedule>  carbidopa/levodopa  25/100 2 Tablet(s) Oral <User Schedule>  chlorhexidine 4% Liquid 1 Application(s) Topical <User Schedule>  cinacalcet 60 milliGRAM(s) Oral daily  dextrose 40% Gel 15 Gram(s) Oral once  dextrose 5%. 1000 milliLiter(s) (50 mL/Hr) IV Continuous <Continuous>  dextrose 5%. 1000 milliLiter(s) (100 mL/Hr) IV Continuous <Continuous>  dextrose 5%. 1000 milliLiter(s) (40 mL/Hr) IV Continuous <Continuous>  dextrose 50% Injectable 25 Gram(s) IV Push once  dextrose 50% Injectable 25 Gram(s) IV Push once  dextrose 50% Injectable 12.5 Gram(s) IV Push once  dextrose 50% Injectable 25 Gram(s) IV Push once  diVALproex Sprinkle 250 milliGRAM(s) Oral three times a day  DULoxetine 20 milliGRAM(s) Oral daily  folic acid 1 milliGRAM(s) Oral daily  glucagon  Injectable 1 milliGRAM(s) IntraMuscular once  insulin lispro (ADMELOG) corrective regimen sliding scale   SubCutaneous every 6 hours  levothyroxine Injectable 20 MICROGram(s) IV Push at bedtime  memantine 5 milliGRAM(s) Oral at bedtime  metoprolol tartrate Injectable 5 milliGRAM(s) IV Push every 6 hours  midodrine 10 milliGRAM(s) Oral <User Schedule>  mirtazapine 7.5 milliGRAM(s) Oral daily  Nephro-celeste 1 Tablet(s) Oral daily  pantoprazole Infusion 8 mG/Hr (10 mL/Hr) IV Continuous <Continuous>  QUEtiapine 50 milliGRAM(s) Oral at bedtime  tamsulosin 0.4 milliGRAM(s) Oral at bedtime  trihexyphenidyl 2 milliGRAM(s) Oral three times a day  vancomycin    Solution 125 milliGRAM(s) Oral every 6 hours    MEDICATIONS  (PRN):  acetaminophen     Tablet .. 650 milliGRAM(s) Oral every 6 hours PRN Mild Pain (1 - 3)  albuterol/ipratropium for Nebulization 3 milliLiter(s) Nebulizer every 6 hours PRN Shortness of Breath and/or Wheezing  diVALproex Sprinkle 125 milliGRAM(s) Oral every 8 hours PRN Agitation  guaiFENesin Oral Liquid (Sugar-Free) 200 milliGRAM(s) Oral every 6 hours PRN Cough  QUEtiapine 12.5 milliGRAM(s) Oral every 6 hours PRN agitation  sodium chloride 0.9% lock flush 10 milliLiter(s) IV Push every 1 hour PRN Pre/post blood products, medications, blood draw, and to maintain line patency      Care Discussed with Consultants/Other Providers [x] YES  [ ] NO    Vital Signs Last 24 Hrs  T(C): 37.2 (15 Feb 2022 11:48), Max: 37.2 (15 Feb 2022 11:48)  T(F): 99 (15 Feb 2022 11:48), Max: 99 (15 Feb 2022 11:48)  HR: 102 (15 Feb 2022 11:48) (61 - 112)  BP: 111/72 (15 Feb 2022 11:48) (92/66 - 111/72)  BP(mean): --  RR: 18 (15 Feb 2022 11:48) (18 - 18)  SpO2: 100% (15 Feb 2022 11:48) (98% - 100%)  I&O's Summary    14 Feb 2022 07:01  -  15 Feb 2022 07:00  --------------------------------------------------------  IN: 890 mL / OUT: 750 mL / NET: 140 mL    15 Feb 2022 07:01  -  15 Feb 2022 12:25  --------------------------------------------------------  IN: 0 mL / OUT: 0 mL / NET: 0 mL    PHYSICAL EXAM:  GENERAL: NAD,   EYES: conjunctiva and sclera clear  ENMT: Moist mucous membranes  NECK: Supple, No JVD, Normal thyroid  CHEST/LUNG: non labored, cta b/l  HEART: Regular rate and rhythm; No murmurs, rubs, or gallops  ABDOMEN: Soft, Nontender, Nondistended; Bowel sounds present  EXTREMITIES:  2+ Peripheral Pulses, No clubbing, cyanosis, or edema  LYMPH: No lymphadenopathy noted  SKIN: No rashes or lesions

## 2022-02-15 NOTE — PROGRESS NOTE ADULT - SUBJECTIVE AND OBJECTIVE BOX
NEPHROLOGY-[facility] [phone]        Patient seen and examined on HD, currently receiving 1 unit PRBC, hypotensive will likely get fluid after session completed.         MEDICATIONS  (STANDING):  atorvastatin 80 milliGRAM(s) Oral at bedtime  carbidopa/levodopa  25/100 2.5 Tablet(s) Oral <User Schedule>  carbidopa/levodopa  25/100 2 Tablet(s) Oral <User Schedule>  chlorhexidine 4% Liquid 1 Application(s) Topical <User Schedule>  cinacalcet 60 milliGRAM(s) Oral daily  dextrose 40% Gel 15 Gram(s) Oral once  dextrose 5%. 1000 milliLiter(s) (50 mL/Hr) IV Continuous <Continuous>  dextrose 5%. 1000 milliLiter(s) (100 mL/Hr) IV Continuous <Continuous>  dextrose 5%. 1000 milliLiter(s) (40 mL/Hr) IV Continuous <Continuous>  dextrose 50% Injectable 25 Gram(s) IV Push once  dextrose 50% Injectable 25 Gram(s) IV Push once  dextrose 50% Injectable 12.5 Gram(s) IV Push once  dextrose 50% Injectable 25 Gram(s) IV Push once  diVALproex Sprinkle 250 milliGRAM(s) Oral three times a day  DULoxetine 20 milliGRAM(s) Oral daily  folic acid 1 milliGRAM(s) Oral daily  glucagon  Injectable 1 milliGRAM(s) IntraMuscular once  insulin lispro (ADMELOG) corrective regimen sliding scale   SubCutaneous every 6 hours  levothyroxine Injectable 20 MICROGram(s) IV Push at bedtime  memantine 5 milliGRAM(s) Oral at bedtime  metoprolol tartrate Injectable 5 milliGRAM(s) IV Push every 6 hours  midodrine 10 milliGRAM(s) Oral <User Schedule>  mirtazapine 7.5 milliGRAM(s) Oral daily  Nephro-celeste 1 Tablet(s) Oral daily  pantoprazole Infusion 8 mG/Hr (10 mL/Hr) IV Continuous <Continuous>  QUEtiapine 50 milliGRAM(s) Oral at bedtime  tamsulosin 0.4 milliGRAM(s) Oral at bedtime  trihexyphenidyl 2 milliGRAM(s) Oral three times a day  vancomycin    Solution 125 milliGRAM(s) Oral every 6 hours      VITAL:  T(C): , Max: 36.6 (02-14-22 @ 20:59)  T(F): , Max: 97.8 (02-14-22 @ 20:59)  HR: 61 (02-15-22 @ 08:30)  BP: 92/66 (02-15-22 @ 08:30)  BP(mean): --  RR: 18 (02-15-22 @ 08:30)  SpO2: 99% (02-15-22 @ 08:30)  Wt(kg): --    I and O's:    02-14 @ 07:01  -  02-15 @ 07:00  --------------------------------------------------------  IN: 890 mL / OUT: 750 mL / NET: 140 mL          PHYSICAL EXAM:    Constitutional: NAD  Neck:  No JVD  Respiratory: CTAB/L  Cardiovascular: S1 and S2  Gastrointestinal: BS+, soft, NT/ND +PEG  Extremities: + LLE edema   Neurological: UTO  : + Javier  Skin: No rashes  Access: + AVF; +permacath (accessed)    LABS:                        7.1    16.53 )-----------( 231      ( 14 Feb 2022 17:16 )             21.1     02-14    128<L>  |  93<L>  |  93<H>  ----------------------------<  145<H>  4.3   |  22  |  3.36<H>    Ca    7.9<L>      14 Feb 2022 09:04  Phos  1.9     02-14  Mg     1.8     02-14            Urine Studies:    Osmolality, Random Urine: 344 mos/kg (02-15 @ 06:14)                   NEPHROLOGY-[facility] [phone]        Patient seen and examined on HD, currently receiving 1 unit PRBC, hypotensive will likely get fluid after session completed.         MEDICATIONS  (STANDING):  atorvastatin 80 milliGRAM(s) Oral at bedtime.  carbidopa/levodopa  25/100 2.5 Tablet(s) Oral <User Schedule>  carbidopa/levodopa  25/100 2 Tablet(s) Oral <User Schedule>  chlorhexidine 4% Liquid 1 Application(s) Topical <User Schedule>  cinacalcet 60 milliGRAM(s) Oral daily  dextrose 40% Gel 15 Gram(s) Oral once  dextrose 5%. 1000 milliLiter(s) (50 mL/Hr) IV Continuous <Continuous>  dextrose 5%. 1000 milliLiter(s) (100 mL/Hr) IV Continuous <Continuous>  dextrose 5%. 1000 milliLiter(s) (40 mL/Hr) IV Continuous <Continuous>  dextrose 50% Injectable 25 Gram(s) IV Push once  dextrose 50% Injectable 25 Gram(s) IV Push once  dextrose 50% Injectable 12.5 Gram(s) IV Push once  dextrose 50% Injectable 25 Gram(s) IV Push once  diVALproex Sprinkle 250 milliGRAM(s) Oral three times a day  DULoxetine 20 milliGRAM(s) Oral daily  folic acid 1 milliGRAM(s) Oral daily  glucagon  Injectable 1 milliGRAM(s) IntraMuscular once  insulin lispro (ADMELOG) corrective regimen sliding scale   SubCutaneous every 6 hours  levothyroxine Injectable 20 MICROGram(s) IV Push at bedtime  memantine 5 milliGRAM(s) Oral at bedtime  metoprolol tartrate Injectable 5 milliGRAM(s) IV Push every 6 hours  midodrine 10 milliGRAM(s) Oral <User Schedule>  mirtazapine 7.5 milliGRAM(s) Oral daily  Nephro-celeste 1 Tablet(s) Oral daily  pantoprazole Infusion 8 mG/Hr (10 mL/Hr) IV Continuous <Continuous>  QUEtiapine 50 milliGRAM(s) Oral at bedtime  tamsulosin 0.4 milliGRAM(s) Oral at bedtime  trihexyphenidyl 2 milliGRAM(s) Oral three times a day  vancomycin    Solution 125 milliGRAM(s) Oral every 6 hours      VITAL:  T(C): , Max: 36.6 (02-14-22 @ 20:59)  T(F): , Max: 97.8 (02-14-22 @ 20:59)  HR: 61 (02-15-22 @ 08:30)  BP: 92/66 (02-15-22 @ 08:30)  BP(mean): --  RR: 18 (02-15-22 @ 08:30)  SpO2: 99% (02-15-22 @ 08:30)  Wt(kg): --    I and O's:    02-14 @ 07:01  -  02-15 @ 07:00  --------------------------------------------------------  IN: 890 mL / OUT: 750 mL / NET: 140 mL          PHYSICAL EXAM:    Constitutional: NAD.  Neck:  No JVD  Respiratory: CTAB/L  Cardiovascular: S1 and S2  Gastrointestinal: BS+, soft, NT/ND +PEG  Extremities: + LLE edema   Neurological: UTO  : + Javier  Skin: No rashes  Access: + AVF; +permacath (accessed)    LABS:                        7.1    16.53 )-----------( 231      ( 14 Feb 2022 17:16 )             21.1     02-14    128<L>  |  93<L>  |  93<H>  ----------------------------<  145<H>  4.3   |  22  |  3.36<H>    Ca    7.9<L>      14 Feb 2022 09:04  Phos  1.9     02-14  Mg     1.8     02-14            Urine Studies:    Osmolality, Random Urine: 344 mos/kg (02-15 @ 06:14)

## 2022-02-15 NOTE — PROGRESS NOTE ADULT - ASSESSMENT
71M w/ HTN, DM2, CAD-CABG, Parkinson's disease, and advanced CKD, 10/21/21 p/w LE swelling, c/b COVID; now newly ESRD-HD as of this admission    (1)Renal - ESRD-HD MWF - HD now  (2)Anemia- significant drop in H/H; transfuse 1 unit PRBC with HD today   (3)Failure to thrive and confusion     RECOMMEND  (1)HD now 0kg UF  Please do not use AVF(maturing), use perm cath  (2)1 unit of blood at HD.  H/H monitoring as per primary team.    (3)IV protonix gtt   (4)Check fecal occult again or CT with contrast     Phylicia Ornelas NP  [facility]   [phone]            71M w/ HTN, DM2, CAD-CABG, Parkinson's disease, and advanced CKD, 10/21/21 p/w LE swelling, c/b COVID; now newly ESRD-HD as of this admission    (1)Renal - ESRD-HD MWF - HD now  (2)Anemia- significant drop in H/H; transfuse 1 unit PRBC with HD today   (3)Failure to thrive and confusion     RECOMMEND  (1)HD now 0kg UF  Please do not use AVF(maturing), use perm cath.  (2)1 unit of blood at HD.  H/H monitoring as per primary team.    (3)IV protonix gtt   (4)Check fecal occult again or CT with contrast     Phylicia Ornelas NP  [facility]   [phone]

## 2022-02-15 NOTE — PROGRESS NOTE ADULT - ATTENDING COMMENTS
Renal attd    -HD with blood.  No fluid removal and may actually need more IVF    Sayed [facility]   [phone]

## 2022-02-15 NOTE — PROGRESS NOTE ADULT - ASSESSMENT
71 M w volume overload, GI consulted for drop in hgb    1. CHF per cardio    2. ABBY on CKD per renal  -on HD via permacath per renal, midodrine during dialysis  -AVF not functional   -s/p tunnelled HD catheter insertion by IR 2/10    3. RP bleed  -s/p Abdominal Angiography and Embolization on 10/29/2021 by Interventional radiology of l4and l5 lumbar artery     4. Failure to thrive  -s/p PEG 1/10    -patient pulled out PEG  -IR replaced PEG 2/10  - tolerating feeds    5. stress duodenal ulcers  -PPI BID    6. Constipation  -resolved  -continue Miralax BID  -DC planning to rehab underway     7. Melena, hgb dropping  -no recent episodes of melena. No brbpr  -previous endoscopy showed duodenal ulcers   -hgb 5.1 today, recc transfuse 2 units PRBCs  -continue protonix gtt  -plan for endoscopy tomorrow    8. C-diff  -on vanco     Attending supervision statement: I have personally seen and examined the patient. I fully participated in the care of this patient. I have made amendments to the documentation where necessary, and agree with the history, physical exam, and plan as outlined by the ACP.      [facility]  Gastroenterology and Hepatology  [address] [place], [place]  Office: [phone]  Cell: [phone]   71 M w volume overload, GI consulted for drop in hgb    1. CHF per cardio    2. ABBY on CKD per renal  -on HD via permacath per renal, midodrine during dialysis  -AVF not functional   -s/p tunnelled HD catheter insertion by IR 2/10    3. RP bleed  -s/p Abdominal Angiography and Embolization on 10/29/2021 by Interventional radiology of l4and l5 lumbar artery     4. Failure to thrive  -s/p PEG 1/10    -patient pulled out PEG  -IR replaced PEG 2/10  - tolerating feeds    5. stress duodenal ulcers  -PPI BID    6. Constipation  -resolved  -continue Miralax BID  -DC planning to rehab underway     7. Melena, hgb dropping  -melena noted on rectal exam   -previous endoscopy showed duodenal ulcers   -hgb 5.1 today, recc transfuse 2 units PRBCs  -continue protonix gtt  -plan for endoscopy tomorrow    8. C-diff  -on vanco     Attending supervision statement: I have personally seen and examined the patient. I fully participated in the care of this patient. I have made amendments to the documentation where necessary, and agree with the history, physical exam, and plan as outlined by the ACP.      [facility]  Gastroenterology and Hepatology  [address] [place], [place]  Office: [phone]  Cell: [phone]   71 M w volume overload, GI consulted for drop in hgb    1. CHF per cardio    2. ABBY on CKD per renal  -on HD via permacath per renal, midodrine during dialysis  -AVF not functional   -s/p tunnelled HD catheter insertion by IR 2/10    3. RP bleed  -s/p Abdominal Angiography and Embolization on 10/29/2021 by Interventional radiology of l4and l5 lumbar artery     4. Failure to thrive  -s/p PEG 1/10    -patient pulled out PEG  -IR replaced PEG 2/10  - tolerating feeds    5. stress duodenal ulcers  -PPI BID    6. Constipation  -resolved  -continue Miralax BID  -DC planning to rehab underway     7. Melena, hgb dropping  -melena noted on rectal exam   -previous endoscopy showed duodenal ulcers   -hgb 5.1 today, repeat cbc sent and pending result  -recc PRBC transfusion if hgb <7  -continue protonix gtt  -keep TF on hold  -plan for endoscopy tomorrow  -MICU consult called     8. C-diff  -last BM at 5am, dark brown pastey   -on vanco     Attending supervision statement: I have personally seen and examined the patient. I fully participated in the care of this patient. I have made amendments to the documentation where necessary, and agree with the history, physical exam, and plan as outlined by the ACP.      [facility]  Gastroenterology and Hepatology  [address] [place], [place]  Office: [phone]  Cell: [phone]   71 M w volume overload, GI consulted for drop in hgb    1. CHF per cardio    2. ABBY on CKD per renal  -on HD via permacath per renal, midodrine during dialysis  -AVF not functional   -s/p tunnelled HD catheter insertion by IR 2/10    3. RP bleed  -s/p Abdominal Angiography and Embolization on 10/29/2021 by Interventional radiology of l4and l5 lumbar artery     4. Failure to thrive  -s/p PEG 1/10    -patient pulled out PEG  -IR replaced PEG 2/10  - tolerating feeds    5. stress duodenal ulcers  -PPI BID    6. Constipation  -resolved  -continue Miralax BID  -DC planning to rehab underway     7. Melena, hgb dropping  -melena noted on rectal exam   -previous endoscopy showed duodenal ulcers   -hgb 5.1 today, repeat 7.9  -continue to transfuse prn  -continue protonix gtt  -monitor for GI bleeding   -keep TF on hold  -plan for endoscopy tomorrow  -MICU consult called     8. C-diff  -last BM at 5am, dark brown pastey   -on vanco     Attending supervision statement: I have personally seen and examined the patient. I fully participated in the care of this patient. I have made amendments to the documentation where necessary, and agree with the history, physical exam, and plan as outlined by the ACP.      [facility]  Gastroenterology and Hepatology  [address] [place], [place]  Office: [phone]  Cell: [phone]   71 M w volume overload, GI consulted for drop in hgb    1. GI Bleeding  -trying for conservative mgmt given pt family wishes, and comorbidities  -hgb responded to transfusion, will reassess tomorrow  -PPI infusion  -called family again to uptdate    2. ABBY on CKD per renal  -on HD via permacath per renal, midodrine during dialysis  -AVF not functional   -s/p tunnelled HD catheter insertion by IR 2/10    3. RP bleed  -s/p Abdominal Angiography and Embolization on 10/29/2021 by Interventional radiology of l4and l5 lumbar artery     3. C-diff  -last BM at 5am, dark brown pastey   -on vanco     Attending supervision statement: I have personally seen and examined the patient. I fully participated in the care of this patient. I have made amendments to the documentation where necessary, and agree with the history, physical exam, and plan as outlined by the ACP.      [facility]  Gastroenterology and Hepatology  [address] [place], [place]  Office: [phone]  Cell: [phone]

## 2022-02-15 NOTE — PROGRESS NOTE ADULT - SUBJECTIVE AND OBJECTIVE BOX
Chief complaint  Patient is a 71y old  Male who presents with a chief complaint of leg swelling (15 Feb 2022 12:43)   Review of systems  Patient in bed, looks comfortable, no hypoglycemic episodes.    Labs and Fingersticks  CAPILLARY BLOOD GLUCOSE      POCT Blood Glucose.: 120 mg/dL (15 Feb 2022 12:59)  POCT Blood Glucose.: 153 mg/dL (15 Feb 2022 05:41)  POCT Blood Glucose.: 160 mg/dL (15 Feb 2022 00:23)  POCT Blood Glucose.: 182 mg/dL (14 Feb 2022 19:00)      Anion Gap, Serum: 14 (02-15 @ 10:18)  Anion Gap, Serum: 13 (02-14 @ 09:04)      Calcium, Total Serum: 7.7 *L* (02-15 @ 10:18)  Calcium, Total Serum: 7.9 *L* (02-14 @ 09:04)  Vitamin D, 25-Hydroxy: 47.1 (02-14 @ 13:54)          02-15    130<L>  |  93<L>  |  104<H>  ----------------------------<  129<H>  4.3   |  23  |  3.77<H>    Ca    7.7<L>      15 Feb 2022 10:18  Phos  1.9     02-14  Mg     1.8     02-14                          7.9    11.82 )-----------( 188      ( 15 Feb 2022 13:41 )             23.6     Medications  MEDICATIONS  (STANDING):  atorvastatin 80 milliGRAM(s) Oral at bedtime  carbidopa/levodopa  25/100 2.5 Tablet(s) Oral <User Schedule>  carbidopa/levodopa  25/100 2 Tablet(s) Oral <User Schedule>  chlorhexidine 4% Liquid 1 Application(s) Topical <User Schedule>  cinacalcet 60 milliGRAM(s) Oral daily  dextrose 40% Gel 15 Gram(s) Oral once  dextrose 5%. 1000 milliLiter(s) (50 mL/Hr) IV Continuous <Continuous>  dextrose 5%. 1000 milliLiter(s) (100 mL/Hr) IV Continuous <Continuous>  dextrose 5%. 1000 milliLiter(s) (40 mL/Hr) IV Continuous <Continuous>  dextrose 50% Injectable 25 Gram(s) IV Push once  dextrose 50% Injectable 25 Gram(s) IV Push once  dextrose 50% Injectable 12.5 Gram(s) IV Push once  dextrose 50% Injectable 25 Gram(s) IV Push once  diVALproex Sprinkle 250 milliGRAM(s) Oral three times a day  DULoxetine 20 milliGRAM(s) Oral daily  folic acid 1 milliGRAM(s) Oral daily  glucagon  Injectable 1 milliGRAM(s) IntraMuscular once  insulin lispro (ADMELOG) corrective regimen sliding scale   SubCutaneous every 6 hours  levothyroxine Injectable 20 MICROGram(s) IV Push at bedtime  memantine 5 milliGRAM(s) Oral at bedtime  metoprolol tartrate Injectable 5 milliGRAM(s) IV Push every 6 hours  midodrine 10 milliGRAM(s) Oral <User Schedule>  mirtazapine 7.5 milliGRAM(s) Oral daily  Nephro-celeste 1 Tablet(s) Oral daily  pantoprazole Infusion 8 mG/Hr (10 mL/Hr) IV Continuous <Continuous>  QUEtiapine 50 milliGRAM(s) Oral at bedtime  tamsulosin 0.4 milliGRAM(s) Oral at bedtime  trihexyphenidyl 2 milliGRAM(s) Oral three times a day  vancomycin    Solution 125 milliGRAM(s) Oral every 6 hours      Physical Exam  General: Patient comfortable in bed  Vital Signs Last 12 Hrs  T(F): 98.6 (02-15-22 @ 13:23), Max: 99 (02-15-22 @ 11:48)  HR: 118 (02-15-22 @ 13:23) (61 - 118)  BP: 120/79 (02-15-22 @ 13:23) (92/66 - 120/79)  BP(mean): --  RR: 18 (02-15-22 @ 13:23) (18 - 18)  SpO2: 95% (02-15-22 @ 13:23) (95% - 100%)     Chief complaint  Patient is a 71y old  Male who presents with a chief complaint of leg swelling (15 Feb 2022 12:43)   Review of systems  Patient in bed, looks comfortable, no hypoglycemic episodes.    Labs and Fingersticks  CAPILLARY BLOOD GLUCOSE        POCT Blood Glucose.: 120 mg/dL (15 Feb 2022 12:59)  POCT Blood Glucose.: 153 mg/dL (15 Feb 2022 05:41)  POCT Blood Glucose.: 160 mg/dL (15 Feb 2022 00:23)  POCT Blood Glucose.: 182 mg/dL (14 Feb 2022 19:00)      Anion Gap, Serum: 14 (02-15 @ 10:18)  Anion Gap, Serum: 13 (02-14 @ 09:04)      Calcium, Total Serum: 7.7 *L* (02-15 @ 10:18)  Calcium, Total Serum: 7.9 *L* (02-14 @ 09:04)  Vitamin D, 25-Hydroxy: 47.1 (02-14 @ 13:54)          02-15    130<L>  |  93<L>  |  104<H>  ----------------------------<  129<H>  4.3   |  23  |  3.77<H>    Ca    7.7<L>      15 Feb 2022 10:18  Phos  1.9     02-14  Mg     1.8     02-14                          7.9    11.82 )-----------( 188      ( 15 Feb 2022 13:41 )             23.6     Medications  MEDICATIONS  (STANDING):  atorvastatin 80 milliGRAM(s) Oral at bedtime  carbidopa/levodopa  25/100 2.5 Tablet(s) Oral <User Schedule>  carbidopa/levodopa  25/100 2 Tablet(s) Oral <User Schedule>  chlorhexidine 4% Liquid 1 Application(s) Topical <User Schedule>  cinacalcet 60 milliGRAM(s) Oral daily  dextrose 40% Gel 15 Gram(s) Oral once  dextrose 5%. 1000 milliLiter(s) (50 mL/Hr) IV Continuous <Continuous>  dextrose 5%. 1000 milliLiter(s) (100 mL/Hr) IV Continuous <Continuous>  dextrose 5%. 1000 milliLiter(s) (40 mL/Hr) IV Continuous <Continuous>  dextrose 50% Injectable 25 Gram(s) IV Push once  dextrose 50% Injectable 25 Gram(s) IV Push once  dextrose 50% Injectable 12.5 Gram(s) IV Push once  dextrose 50% Injectable 25 Gram(s) IV Push once  diVALproex Sprinkle 250 milliGRAM(s) Oral three times a day  DULoxetine 20 milliGRAM(s) Oral daily  folic acid 1 milliGRAM(s) Oral daily  glucagon  Injectable 1 milliGRAM(s) IntraMuscular once  insulin lispro (ADMELOG) corrective regimen sliding scale   SubCutaneous every 6 hours  levothyroxine Injectable 20 MICROGram(s) IV Push at bedtime  memantine 5 milliGRAM(s) Oral at bedtime  metoprolol tartrate Injectable 5 milliGRAM(s) IV Push every 6 hours  midodrine 10 milliGRAM(s) Oral <User Schedule>  mirtazapine 7.5 milliGRAM(s) Oral daily  Nephro-celeste 1 Tablet(s) Oral daily  pantoprazole Infusion 8 mG/Hr (10 mL/Hr) IV Continuous <Continuous>  QUEtiapine 50 milliGRAM(s) Oral at bedtime  tamsulosin 0.4 milliGRAM(s) Oral at bedtime  trihexyphenidyl 2 milliGRAM(s) Oral three times a day  vancomycin    Solution 125 milliGRAM(s) Oral every 6 hours      Physical Exam  General: Patient comfortable in bed  Vital Signs Last 12 Hrs  T(F): 98.6 (02-15-22 @ 13:23), Max: 99 (02-15-22 @ 11:48)  HR: 118 (02-15-22 @ 13:23) (61 - 118)  BP: 120/79 (02-15-22 @ 13:23) (92/66 - 120/79)  BP(mean): --  RR: 18 (02-15-22 @ 13:23) (18 - 18)  SpO2: 95% (02-15-22 @ 13:23) (95% - 100%)

## 2022-02-16 NOTE — PROVIDER CONTACT NOTE (CRITICAL VALUE NOTIFICATION) - SITUATION
Lab hemoglobin 6.3 and hematocrit 19.6
Pt with APTT >200
hemoglobin 6.0, hematocrit 18.2
preliminary BCx from 10/21 positive growth in aerobic bottle, gram+ cocci in pairs.
hemoglobin 6.1 and hematocrit 19.0
Covid test positive
Pt Hgb is 5.9 and Hematocrit 19.8.
h/h 6.9/23.1
hb 7.0/ hct 20.9
Patient on heparin gtt @ 13ml/hr for afib. PTT >200.
Hmg 6.5 Hct 19.1
Patient originally admitted for NSTEMI ,HF exacerbation, complicated by RP bleed. Currently in HD
aPTT>200
PTT > 200 PT on hep drip for AFlutter.
hemoglobin-5.1,hematocrit-16.6
hgb 6.8 / hct 20.8
Pt CBC: hemoglobin 5.5/hematocrit 16.9
RRT
received critical lab value from lab k- 2.8 ca- 4.5, hgb- 6.0 hct- 20.8
Hg 7.0, Hematocrit 21.2
aPTT>200
H/H from [facility] 7/21
glucose 798, NA- 116
low H/H

## 2022-02-16 NOTE — PROVIDER CONTACT NOTE (CRITICAL VALUE NOTIFICATION) - RECOMMENDATIONS
RRT
Notify provider
NP notify
notify provider
Notify provider.
PA made aware
Notify provider
Notify provider.
RRT
repeat h/h
transfuse 1 unit prbc
1 unit PRBC?
PA made aware
Provider made aware.
repeat blood test
NP notify
Notify provider, patient to receive transfusion
Repeat covid
ZACH Lester made aware.
patient to receive transfusion

## 2022-02-16 NOTE — PRE-ANESTHESIA EVALUATION ADULT - NSANTHAPLANRD_GEN_ALL_CORE
monitored anesthesia care (MAC)

## 2022-02-16 NOTE — PROGRESS NOTE ADULT - ASSESSMENT
71M w/ HTN, DM2, CAD-CABG, Parkinson's disease, and advanced CKD, 10/21/21 p/w LE swelling, c/b COVID; now newly ESRD-HD as of this admission    (1)Renal - ESRD-HD   (2)Anemia- significant drop in H/H;    (3)Failure to thrive and confusion     RECOMMEND  (1)EGD today.  (2)1 unit of blood today and total of 5.   I would give another unit of blood on the floor     (3)IV protonix gtt   (4) r CT with contrast or  without contrast     Sayed [facility]   [phone]

## 2022-02-16 NOTE — PROVIDER CONTACT NOTE (CRITICAL VALUE NOTIFICATION) - NS PROVIDER READ BACK TO LAB
yes
Hg 7.0, Hematocrit 21.2/yes
yes

## 2022-02-16 NOTE — PROVIDER CONTACT NOTE (CRITICAL VALUE NOTIFICATION) - NAME OF MD/NP/PA/DO NOTIFIED:
Claudia Guzman NP
Luzmaria Lee, PA
Marley, ZACH
Suzanne Grace
YOSEF Jacinto
YOSEF Lester
ACP Ronnie
ZACH Boyle
Luzmaria Lee, PA
YOSEF Lester
SKYLER Lester
SKYLER Duenas
SKYLER Duenas
Felipa Craft, NP
Veronica SERRANO
ZACH CAMERON
ZACH aaron
renaldo HOLLAND
SKYLER varma
Zaina Keller
renaldo HOLLAND
truong casillas(np)
RRT team
ZACH Campos
ZACH aaron

## 2022-02-16 NOTE — CHART NOTE - NSCHARTNOTEFT_GEN_A_CORE
Cardiology brief note    Notified by primary team patient requires urgent GI procedure for GI bleed.   chart reviewed.   No absolute cardiac contraindications for GI procedures such as EGD/colonoscopy.     Please call as needed.     Go Bobby D.O.  [phone]

## 2022-02-16 NOTE — PROVIDER CONTACT NOTE (CRITICAL VALUE NOTIFICATION) - PERSON GIVING RESULT:
Marcin Rust  quentin
joss johnston
Amelia buchanan
Mark Snider / Laboratory
Fabiano Lau, Nadir
Lab Marco Thomas
Marcin Rust  Nadir
Alina Pardo, Lab
Ellis Jhaveri
Lab - Carli Rivera
Liz Savage
Giorgi Muniz
Marcin Rust
Marco Thomas, Lab
Mata Mistery/ Lab
micheline saavedra
Lab/Amelia Davis
Wili Rust, lab
Lab michelinemehrdad barone
Selma Gonzalez
agapito cárdenas and anastasiia bishop,
Ellis Schmidt
Marley Schmitz
Valerie
Ellis Jhaveri

## 2022-02-16 NOTE — PROVIDER CONTACT NOTE (CRITICAL VALUE NOTIFICATION) - NS PROVIDER READ BACK
yes
Hg 7.0, Hematocrit 21.2/yes
yes

## 2022-02-16 NOTE — PROGRESS NOTE ADULT - SUBJECTIVE AND OBJECTIVE BOX
NEPHROLOGY-[facility] [phone]        Patient seen and examined in bed.  He was the same   On Protonix gtt        MEDICATIONS  (STANDING):  atorvastatin 80 milliGRAM(s) Oral at bedtime  carbidopa/levodopa  25/100 2.5 Tablet(s) Oral <User Schedule>  carbidopa/levodopa  25/100 2 Tablet(s) Oral <User Schedule>  chlorhexidine 4% Liquid 1 Application(s) Topical <User Schedule>  cinacalcet 60 milliGRAM(s) Oral daily  dextrose 40% Gel 15 Gram(s) Oral once  dextrose 5%. 1000 milliLiter(s) (50 mL/Hr) IV Continuous <Continuous>  dextrose 5%. 1000 milliLiter(s) (100 mL/Hr) IV Continuous <Continuous>  dextrose 5%. 1000 milliLiter(s) (40 mL/Hr) IV Continuous <Continuous>  dextrose 50% Injectable 25 Gram(s) IV Push once  dextrose 50% Injectable 25 Gram(s) IV Push once  dextrose 50% Injectable 12.5 Gram(s) IV Push once  dextrose 50% Injectable 25 Gram(s) IV Push once  diVALproex Sprinkle 250 milliGRAM(s) Oral three times a day  DULoxetine 20 milliGRAM(s) Oral daily  folic acid 1 milliGRAM(s) Oral daily  glucagon  Injectable 1 milliGRAM(s) IntraMuscular once  insulin lispro (ADMELOG) corrective regimen sliding scale   SubCutaneous every 6 hours  levothyroxine Injectable 20 MICROGram(s) IV Push at bedtime  memantine 5 milliGRAM(s) Oral at bedtime  metoprolol tartrate Injectable 5 milliGRAM(s) IV Push every 6 hours  midodrine 10 milliGRAM(s) Oral <User Schedule>  mirtazapine 7.5 milliGRAM(s) Oral daily  Nephro-celeste 1 Tablet(s) Oral daily  pantoprazole Infusion 8 mG/Hr (10 mL/Hr) IV Continuous <Continuous>  QUEtiapine 50 milliGRAM(s) Oral at bedtime  tamsulosin 0.4 milliGRAM(s) Oral at bedtime  trihexyphenidyl 2 milliGRAM(s) Oral three times a day  vancomycin    Solution 125 milliGRAM(s) Oral every 6 hours      VITAL:  T(C): , Max: 37.6 (02-15-22 @ 20:36)  T(F): , Max: 99.6 (02-15-22 @ 20:36)  HR: 80 (02-16-22 @ 04:31)  BP: 90/51 (02-16-22 @ 06:40)  BP(mean): --  RR: 18 (02-16-22 @ 04:31)  SpO2: 95% (02-16-22 @ 04:31)  Wt(kg): --    I and O's:    02-15 @ 07:01  -  02-16 @ 07:00  --------------------------------------------------------  IN: 0 mL / OUT: 980 mL / NET: -980 mL          PHYSICAL EXAM:    Constitutional: NAD  Neck:  No JVD  Respiratory: CTAB/L  Cardiovascular: S1 and S2  Gastrointestinal: BS+, soft, NT/ND  Extremities: No peripheral edema  Neurological:  , no focal deficits  Psychiatric: Normal mood, normal affect  : +  Javier  Skin: No rashes  Access: perm cath and avf     LABS:                        5.5    10.41 )-----------( 183      ( 16 Feb 2022 07:05 )             16.9     02-16    134<L>  |  98  |  68<H>  ----------------------------<  139<H>  4.2   |  25  |  2.50<H>    Ca    7.7<L>      16 Feb 2022 07:06    TPro  5.3<L>  /  Alb  1.8<L>  /  TBili  0.3  /  DBili  x   /  AST  21  /  ALT  <5<L>  /  AlkPhos  60  02-16          Urine Studies:    Osmolality, Random Urine: 344 mos/kg (02-15 @ 06:14)        RADIOLOGY & ADDITIONAL STUDIES:

## 2022-02-16 NOTE — PROVIDER CONTACT NOTE (CRITICAL VALUE NOTIFICATION) - ASSESSMENT
Pt A&Ox4. Pt VSS. Pt denies pain/dsicomfort. No s/s of bleeding. Fall precautions in place. Pt safety maintained.
Pt a&ox2 confused, VSS, pt having liquid black tarry stools overnight.
Pt is A&O x4, no complaints made at this time. VSS.
no s/s of active bleeding, VSS
vss/ no s/s active bleeding
Patient A&Ox1. patient shows no signs of distress, patients vitals stable.
Patient A&Ox4, VSS, RP bleed.
VSS. Pt showed no s/s of bleeding.  IV site dry and intact. B/l restraints secured and safety maintained.
RRT
as per dialysis nurse, CBC was sent prior to administration of 1 unit of pRBCs. Transfusion being administered now
Patient sleeping not in any acute distress. k- 2.8 ca- 4.5, hgb- 6.0 hct- 20.8
no s/s of bleeding noted
vitals stable
Patient stable no signs of any acute distress.
vitals noted , no bleeding noted at this time
No change in pt status. Pt VSS as seen in flowsheet. Pt denies pain/discomfort. No s/s of bleeding.
Patient VSS. No complaints.
Patient not in any distress
Pt A&Ox4, denies CP/SOB, no s/s of bleeding noted.
RRT
No overt s/s bleeding. VSS.
VSS.  No distress noted.  Denies any discomfort.  No s/s of bleeding noted.
PT at baseline; no acute changes. VSS.
vitals noted , no bleeding noted ,

## 2022-02-16 NOTE — PROVIDER CONTACT NOTE (CRITICAL VALUE NOTIFICATION) - TEST AND RESULT REPORTED:
hb 7.0/ hct 20.9
glucose 798, NA- 116
H/H from [facility] 7/21
Covid Test positive
H/H 6.8/22.5
Hg 7.0, Hematocrit 21.2
aPTT>200
hemoglobin 6.0, hematocrit 18.2
hemoglobin-5.1,hematocrit-16.6
Hgb-5.9, Hematocrit-19.8
h/h 6.9/23.1
k- 2.8 ca- 4.5, hgb- 6.0 hct- 20.8
HG 6.7
CBC H/H 6.7/20.4
hgb 5.1 hct 15.4
Hmg 6.5 Hct 19.1
hgb 6.8 / hct 20.8
APTT >200
CBC: Hemoglobin 5.5/Hematocrit 16.9
hemoglobin 6.1 and hematocrit 19.0
Lab hemoglobin 6.3 and hematocrit 19.6
PTT >200
aPTT>200
PTT > 200
preliminary BCx from 10/21 positive growth in aerobic bottle, gram+ cocci in pairs.

## 2022-02-16 NOTE — PRE-ANESTHESIA EVALUATION ADULT - NSANTHADDINFOFT_GEN_ALL_CORE
Risks and indications for monitored anesthesia care involving, but not limited to, nausea, aspiration or anaphylaxis requiring endotracheal intubation were discussed. Risks were acknowledged and accepted by patient's son, all of his questions were answered.  It was discussed at length regarding patient's limited DNR order which prohibited chest compression but allows for intubation in the event of hypoventilation from medication side effect or secretions - patient's son Yunier Quintero confirmed DNR restrictions.
DNR rescinded for procedure

## 2022-02-16 NOTE — PRE-ANESTHESIA EVALUATION ADULT - NSANTHOSAYNRD_GEN_A_CORE
No. LETY screening performed.  STOP BANG Legend: 0-2 = LOW Risk; 3-4 = INTERMEDIATE Risk; 5-8 = HIGH Risk

## 2022-02-16 NOTE — PRE-ANESTHESIA EVALUATION ADULT - NSANTHPMHFT_GEN_ALL_CORE
71M PMH parkinson's disease, IDT2DM, HTN, HLD, a. flutter, CAD s/p CABG (2019) p/w NSTEMI, started on heparin gtt c/b GIB.  EF: 35%. 71M PMH parkinson's disease, IDT2DM, HTN, HLD, a. flutter, CAD s/p CABG (2019) p/w NSTEMI, started on heparin gtt c/b GIB.  EF: 35%.  S/P 2U PRBC 71M PMH parkinson's disease, IDT2DM, HTN, HLD, ESRD on HD, a. flutter, CAD s/p CABG (2019) p/w NSTEMI, started on heparin gtt c/b GIB.  EF: 35%.  S/P 2U PRBC

## 2022-02-16 NOTE — PROGRESS NOTE ADULT - ASSESSMENT
72yo M w/ PMHx of CAD (s/p CABG 2019), CKD (unknown stage), DM2, Parkinson's Disease, HTN, depression presents with new onset acute heart failure exacerbation, NSTEMI, started on hep gtt, developed bl RP bleed, acute anemia. sp MICU course for hemorrhagic shock, 10/28 sp IR embolization l4 and l5 lumbar artery. sp permacath and AVF.    # chronic systolic and diastolic heart failure  # ESRD, new HD  # Atrial flutter  # Parkinson's disease   # delirium  # CAD (coronary artery disease)  # HTN  # DM2 (diabetes mellitus, type 2)  # FTT sp PEG  # Cdiff colitis  #GIbleed  midodrine during dialysis  sp L AVF using for HD,   1/29 malfunctioning Lt AVF - 2/2 sp fistulogram and angioplasty by IR - nonfunctioning  sp shiley 2/4, sp permacath 2/10  afib rvr as missed PO medication, cont lopressor, atorvastatin - can give by mouth now that no PEG, PEG not for dysphagia     2/6 pt pulled out peg tube  pt remains on mittens  psych reconsulted-returning to the dose of the Seroquel  to 50mg qhs and prns of Seroquel 12.5mg q6hrs prn agitation.  Depakote 250mg TID monitor PLT/LFTPRN: Depacon 125mg IV q8h PRN agitation, Ativan 0.25mg PO q8h PRN SEVERE agitation only     sp permacath and PEG placement 2/10  son wish all aggressive measures  palliative reconsult appreciated    # GI bleed/melena  transfuse 2uprbc, ppI gtt  hb 6.0, 2 more units prbc today  (hx of duodenal ulcers)  Plan for EGD tomorrow  GI following    #C-diff- cont vanco po  monitor BM    DVT ppx hsq    DNR/DNI    [facility]  [phone]     70yo M w/ PMHx of CAD (s/p CABG 2019), CKD (unknown stage), DM2, Parkinson's Disease, HTN, depression presents with new onset acute heart failure exacerbation, NSTEMI, started on hep gtt, developed bl RP bleed, acute anemia. sp MICU course for hemorrhagic shock, 10/28 sp IR embolization l4 and l5 lumbar artery. sp permacath and AVF.    # chronic systolic and diastolic heart failure  # ESRD, new HD, AVF not matured, sp permacath  # Atrial flutter  # Parkinson's disease   # delirium  # CAD (coronary artery disease)  # HTN  # DM2 (diabetes mellitus, type 2)  # FTT sp PEG, dislodged and replaced  # C diff colitis  # GI bleed    pt actively bleeding, with multiple transfusions, will need emergent EGD as dw GI  updated son Branden HCP over the phone, confirms to cont aggressive measures  medicine pre op eval: pt is without active cardiac conditions, is high risk given co morbidities however, urgent need for EGD given active bleeding  PPT gtt  PO vanco   MICU eval if decompensates    DVT ppx hold AC    to rescind DNR/DNI for EGD    [facility]  [phone]

## 2022-02-16 NOTE — PROGRESS NOTE ADULT - SUBJECTIVE AND OBJECTIVE BOX
Patient is a 71y old  Male who presents with a chief complaint of leg swelling (16 Feb 2022 10:53)      SUBJECTIVE / OVERNIGHT EVENTS:    INCOMPLETE NOTE      Vital Signs Last 24 Hrs  T(C): 36.3 (16 Feb 2022 11:08), Max: 37.6 (15 Feb 2022 20:36)  T(F): 97.4 (16 Feb 2022 11:08), Max: 99.6 (15 Feb 2022 20:36)  HR: 99 (16 Feb 2022 11:08) (80 - 118)  BP: 90/56 (16 Feb 2022 11:08) (89/67 - 120/79)  BP(mean): --  RR: 18 (16 Feb 2022 11:08) (18 - 18)  SpO2: 99% (16 Feb 2022 11:08) (95% - 99%)  I&O's Summary    15 Feb 2022 07:01  -  16 Feb 2022 07:00  --------------------------------------------------------  IN: 0 mL / OUT: 980 mL / NET: -980 mL            LABS:                        5.5    10.41 )-----------( 183      ( 16 Feb 2022 07:05 )             16.9     02-16    134<L>  |  98  |  68<H>  ----------------------------<  139<H>  4.2   |  25  |  2.50<H>    Ca    7.7<L>      16 Feb 2022 07:06    TPro  5.3<L>  /  Alb  1.8<L>  /  TBili  0.3  /  DBili  x   /  AST  21  /  ALT  <5<L>  /  AlkPhos  60  02-16      CAPILLARY BLOOD GLUCOSE      POCT Blood Glucose.: 156 mg/dL (16 Feb 2022 11:53)  POCT Blood Glucose.: 142 mg/dL (16 Feb 2022 05:42)  POCT Blood Glucose.: 171 mg/dL (15 Feb 2022 23:46)  POCT Blood Glucose.: 139 mg/dL (15 Feb 2022 17:39)  POCT Blood Glucose.: 120 mg/dL (15 Feb 2022 12:59)            RADIOLOGY & ADDITIONAL TESTS:    Imaging Personally Reviewed:  [x] YES  [ ] NO    Consultant(s) Notes Reviewed:  [x] YES  [ ] NO    MEDICATIONS  (STANDING):  atorvastatin 80 milliGRAM(s) Oral at bedtime  carbidopa/levodopa  25/100 2.5 Tablet(s) Oral <User Schedule>  carbidopa/levodopa  25/100 2 Tablet(s) Oral <User Schedule>  chlorhexidine 4% Liquid 1 Application(s) Topical <User Schedule>  cinacalcet 60 milliGRAM(s) Oral daily  dextrose 40% Gel 15 Gram(s) Oral once  dextrose 5%. 1000 milliLiter(s) (50 mL/Hr) IV Continuous <Continuous>  dextrose 5%. 1000 milliLiter(s) (100 mL/Hr) IV Continuous <Continuous>  dextrose 5%. 1000 milliLiter(s) (40 mL/Hr) IV Continuous <Continuous>  dextrose 50% Injectable 25 Gram(s) IV Push once  dextrose 50% Injectable 25 Gram(s) IV Push once  dextrose 50% Injectable 12.5 Gram(s) IV Push once  dextrose 50% Injectable 25 Gram(s) IV Push once  diVALproex Sprinkle 250 milliGRAM(s) Oral three times a day  DULoxetine 20 milliGRAM(s) Oral daily  folic acid 1 milliGRAM(s) Oral daily  glucagon  Injectable 1 milliGRAM(s) IntraMuscular once  insulin lispro (ADMELOG) corrective regimen sliding scale   SubCutaneous every 6 hours  levothyroxine Injectable 20 MICROGram(s) IV Push at bedtime  memantine 5 milliGRAM(s) Oral at bedtime  metoprolol tartrate Injectable 5 milliGRAM(s) IV Push every 6 hours  midodrine 10 milliGRAM(s) Oral <User Schedule>  mirtazapine 7.5 milliGRAM(s) Oral daily  Nephro-celeste 1 Tablet(s) Oral daily  pantoprazole Infusion 8 mG/Hr (10 mL/Hr) IV Continuous <Continuous>  QUEtiapine 50 milliGRAM(s) Oral at bedtime  tamsulosin 0.4 milliGRAM(s) Oral at bedtime  trihexyphenidyl 2 milliGRAM(s) Oral three times a day  vancomycin    Solution 125 milliGRAM(s) Oral every 6 hours    MEDICATIONS  (PRN):  acetaminophen     Tablet .. 650 milliGRAM(s) Oral every 6 hours PRN Mild Pain (1 - 3)  albuterol/ipratropium for Nebulization 3 milliLiter(s) Nebulizer every 6 hours PRN Shortness of Breath and/or Wheezing  diVALproex Sprinkle 125 milliGRAM(s) Oral every 8 hours PRN Agitation  guaiFENesin Oral Liquid (Sugar-Free) 200 milliGRAM(s) Oral every 6 hours PRN Cough  QUEtiapine 12.5 milliGRAM(s) Oral every 6 hours PRN agitation  sodium chloride 0.9% lock flush 10 milliLiter(s) IV Push every 1 hour PRN Pre/post blood products, medications, blood draw, and to maintain line patency      Care Discussed with Consultants/Other Providers [x] YES  [ ] NO    HEALTH ISSUES - PROBLEM Dx:  Acute heart failure    Atrial flutter    DM2 (diabetes mellitus, type 2)    CAD (coronary artery disease)    Parkinsons disease    Acute on chronic renal failure    NSTEMI (non-ST elevation myocardial infarction)    Hypertension    Acute kidney injury superimposed on CKD    Anemia    Hypothyroidism    Delirium    Advanced care planning/counseling discussion    Palliative care status    Palliative care encounter    Pain    Stage 5 chronic kidney disease not on chronic dialysis    Hypercalcemia    Preop cardiovascular exam         Patient is a 71y old  Male who presents with a chief complaint of leg swelling (16 Feb 2022 10:53)      SUBJECTIVE / OVERNIGHT EVENTS:    pt seen and examined. active bleeding. pt cannot provide ROS. confused. on RA.      Vital Signs Last 24 Hrs  T(C): 36.3 (16 Feb 2022 11:08), Max: 37.6 (15 Feb 2022 20:36)  T(F): 97.4 (16 Feb 2022 11:08), Max: 99.6 (15 Feb 2022 20:36)  HR: 99 (16 Feb 2022 11:08) (80 - 118)  BP: 90/56 (16 Feb 2022 11:08) (89/67 - 120/79)  BP(mean): --  RR: 18 (16 Feb 2022 11:08) (18 - 18)  SpO2: 99% (16 Feb 2022 11:08) (95% - 99%)  I&O's Summary    15 Feb 2022 07:01  -  16 Feb 2022 07:00  --------------------------------------------------------  IN: 0 mL / OUT: 980 mL / NET: -980 mL    GENERAL: NAD, confused not oriented, arousable  NECK: No JVD  CHEST/LUNG: CTABL, No wheeze  HEART: Regular rate and rhythm; No murmurs, rubs, or gallops  ABDOMEN: Soft, mild tenderness diffusely, Nondistended; Bowel sounds present  EXTREMITIES:  left forearm avf, right chest wall permcath  SKIN: No rashes or lesions      LABS:                        5.5    10.41 )-----------( 183      ( 16 Feb 2022 07:05 )             16.9     02-16    134<L>  |  98  |  68<H>  ----------------------------<  139<H>  4.2   |  25  |  2.50<H>    Ca    7.7<L>      16 Feb 2022 07:06    TPro  5.3<L>  /  Alb  1.8<L>  /  TBili  0.3  /  DBili  x   /  AST  21  /  ALT  <5<L>  /  AlkPhos  60  02-16      CAPILLARY BLOOD GLUCOSE      POCT Blood Glucose.: 156 mg/dL (16 Feb 2022 11:53)  POCT Blood Glucose.: 142 mg/dL (16 Feb 2022 05:42)  POCT Blood Glucose.: 171 mg/dL (15 Feb 2022 23:46)  POCT Blood Glucose.: 139 mg/dL (15 Feb 2022 17:39)  POCT Blood Glucose.: 120 mg/dL (15 Feb 2022 12:59)            RADIOLOGY & ADDITIONAL TESTS:    Imaging Personally Reviewed:  [x] YES  [ ] NO    Consultant(s) Notes Reviewed:  [x] YES  [ ] NO    MEDICATIONS  (STANDING):  atorvastatin 80 milliGRAM(s) Oral at bedtime  carbidopa/levodopa  25/100 2.5 Tablet(s) Oral <User Schedule>  carbidopa/levodopa  25/100 2 Tablet(s) Oral <User Schedule>  chlorhexidine 4% Liquid 1 Application(s) Topical <User Schedule>  cinacalcet 60 milliGRAM(s) Oral daily  dextrose 40% Gel 15 Gram(s) Oral once  dextrose 5%. 1000 milliLiter(s) (50 mL/Hr) IV Continuous <Continuous>  dextrose 5%. 1000 milliLiter(s) (100 mL/Hr) IV Continuous <Continuous>  dextrose 5%. 1000 milliLiter(s) (40 mL/Hr) IV Continuous <Continuous>  dextrose 50% Injectable 25 Gram(s) IV Push once  dextrose 50% Injectable 25 Gram(s) IV Push once  dextrose 50% Injectable 12.5 Gram(s) IV Push once  dextrose 50% Injectable 25 Gram(s) IV Push once  diVALproex Sprinkle 250 milliGRAM(s) Oral three times a day  DULoxetine 20 milliGRAM(s) Oral daily  folic acid 1 milliGRAM(s) Oral daily  glucagon  Injectable 1 milliGRAM(s) IntraMuscular once  insulin lispro (ADMELOG) corrective regimen sliding scale   SubCutaneous every 6 hours  levothyroxine Injectable 20 MICROGram(s) IV Push at bedtime  memantine 5 milliGRAM(s) Oral at bedtime  metoprolol tartrate Injectable 5 milliGRAM(s) IV Push every 6 hours  midodrine 10 milliGRAM(s) Oral <User Schedule>  mirtazapine 7.5 milliGRAM(s) Oral daily  Nephro-celeste 1 Tablet(s) Oral daily  pantoprazole Infusion 8 mG/Hr (10 mL/Hr) IV Continuous <Continuous>  QUEtiapine 50 milliGRAM(s) Oral at bedtime  tamsulosin 0.4 milliGRAM(s) Oral at bedtime  trihexyphenidyl 2 milliGRAM(s) Oral three times a day  vancomycin    Solution 125 milliGRAM(s) Oral every 6 hours    MEDICATIONS  (PRN):  acetaminophen     Tablet .. 650 milliGRAM(s) Oral every 6 hours PRN Mild Pain (1 - 3)  albuterol/ipratropium for Nebulization 3 milliLiter(s) Nebulizer every 6 hours PRN Shortness of Breath and/or Wheezing  diVALproex Sprinkle 125 milliGRAM(s) Oral every 8 hours PRN Agitation  guaiFENesin Oral Liquid (Sugar-Free) 200 milliGRAM(s) Oral every 6 hours PRN Cough  QUEtiapine 12.5 milliGRAM(s) Oral every 6 hours PRN agitation  sodium chloride 0.9% lock flush 10 milliLiter(s) IV Push every 1 hour PRN Pre/post blood products, medications, blood draw, and to maintain line patency      Care Discussed with Consultants/Other Providers [x] YES  [ ] NO    HEALTH ISSUES - PROBLEM Dx:  Acute heart failure    Atrial flutter    DM2 (diabetes mellitus, type 2)    CAD (coronary artery disease)    Parkinsons disease    Acute on chronic renal failure    NSTEMI (non-ST elevation myocardial infarction)    Hypertension    Acute kidney injury superimposed on CKD    Anemia    Hypothyroidism    Delirium    Advanced care planning/counseling discussion    Palliative care status    Palliative care encounter    Pain    Stage 5 chronic kidney disease not on chronic dialysis    Hypercalcemia    Preop cardiovascular exam

## 2022-02-16 NOTE — PROVIDER CONTACT NOTE (CRITICAL VALUE NOTIFICATION) - BACKGROUND
70yo M w/ PMHx of CAD (s/p CABG 2019), CKD (unknown stage), DM2, Parkinson's Disease, HTN, depression presents with new onset acute heart failure exacerbation. Aflutter on tele.
72 y/o M w/ PMHx of CAD (s/p CABG 2019), CKD (unknown stage), DM2, Parkinson's Disease, HTN, depression presents with new onset acute heart failure exacerbation, NSTEMI.
Pt admitted with CHF on IV lasix diureses. Pt with hx of aflutter, NSTEMI, T2D.
adm- acute on chronic systolic chf phx- cad, parkinson, dm, hpn,
admitted for acute on chronic systolic CHF
70yo M w/ PMHx of CAD (s/p CABG 2019), CKD (unknown stage), DM2, Parkinson's Disease, HTN, depression presents with new onset acute heart failure exacerbation
Admit dx: 72yo M w/ PMHx of CAD (s/p CABG 2019), CKD (unknown stage), DM2, Parkinson's Disease, HTN, depression presents with new onset acute heart failure exacerbation. Aflutter on tele.
PTT >200 x 3.
adm- acute on chronic systolic chf, phx- cad, parkinson, dm, hpn
pt admitted for HF
72yo M w/ PMHx of CAD (s/p CABG 2019), CKD (unknown stage), DM2, Parkinson's Disease, HTN, depression presents with new onset acute heart failure exacerbation
CHF, ABBY
CHF, ABBY
Dx: acute on chronic systolic congestive heart failure. Hx: CAD, parkinsons disease, DM, HTN
70yo M w/ PMHx of CAD (s/p CABG 2019), CKD (unknown stage), DM2, Parkinson's Disease, HTN, depression presents with new onset acute heart failure exacerbation
patient admitted for CHF
pt admitted for HF
PT admitted with DX- CHF HX- CAD, DM
RRT
Pt admitted for HF.
Pt admitted for Acute on Chronic Systolic Congestive HF
pt adm with chf,hx dm,htn
RRT

## 2022-02-16 NOTE — PROGRESS NOTE ADULT - ASSESSMENT
Assessment  DMT2: 71y Male with DM T2 with hyperglycemia, A1C 6.4%, was on insulin at home, now on insulin coverage only, blood sugars are stable and trending within acceptable range,  no hypoglycemias planning endoscopy for GIB.  Hypothyroidism: Patient has no history thyroid disease, was not on any thyroid supplements, subclinical with low-normal FT4 and lethargy, on synthroid 12.5 mcg IV daily, repeat TFTs show subclinical state, increased to synthroid 20mcg IV.  Hypercalcemia: Started on Sensipar 60mg daily, Ca improving.  CHF: on medications, stable, monitored.  HTN: Controlled,  on antihypertensive medications.  Parkinsons: on meds, monitored.  CKD: Monitor labs/BMP       Assessment  DMT2: 71y Male with DM T2 with hyperglycemia, A1C 6.4%, was on insulin at home, now on insulin coverage only, blood sugars are stable and trending within acceptable range,  no hypoglycemias planning endoscopy for GIB.  Hypothyroidism: Patient has no history thyroid disease, was not on any thyroid supplements, subclinical with low-normal FT4 and lethargy, on synthroid 12.5 mcg IV daily, repeat  TFTs show subclinical state, increased to synthroid 20mcg IV.  Hypercalcemia: Started on Sensipar 60mg daily, Ca improving.  CHF: on medications, stable, monitored.  HTN: Controlled,  on antihypertensive medications.  Parkinsons: on meds, monitored.  CKD: Monitor labs/BMP

## 2022-02-16 NOTE — PROGRESS NOTE ADULT - ASSESSMENT
71 M w volume overload, GI consulted for drop in hgb    1. GI Bleeding  -hgb 5.5 this morning, currently receiving 1 unit PRBCs  -STAT CBC post transfusion ordered  -Dr. León spoke with family to discuss EGD today  -continue PPI infusion  -continue to hold TF    2. ABBY on CKD per renal  -on HD via permacath per renal, midodrine during dialysis  -AVF not functional   -s/p tunnelled HD catheter insertion by IR 2/10    3. RP bleed  -s/p Abdominal Angiography and Embolization on 10/29/2021 by Interventional radiology of l4and l5 lumbar artery     3. C-diff  -last BM last night, dark brown pastey   -on vanco     Attending supervision statement: I have personally seen and examined the patient. I fully participated in the care of this patient. I have made amendments to the documentation where necessary, and agree with the history, physical exam, and plan as outlined by the ACP.      [facility]  Gastroenterology and Hepatology  [address] [place], [place]  Office: [phone]  Cell: [phone]

## 2022-02-16 NOTE — PROGRESS NOTE ADULT - SUBJECTIVE AND OBJECTIVE BOX
For endoscopy today    Chief Complaint:  Patient is a 71y old  Male who presents with a chief complaint of leg swelling (16 Feb 2022 12:46)      Date of service 02-16-22 @ 13:16      Interval Events:   Patient seen and examined.   Hgb 5.5 this morning. Currently receiving 1 unit PRBCs  Endoscopy today    Hospital Medications:  acetaminophen     Tablet .. 650 milliGRAM(s) Oral every 6 hours PRN  albuterol/ipratropium for Nebulization 3 milliLiter(s) Nebulizer every 6 hours PRN  atorvastatin 80 milliGRAM(s) Oral at bedtime  carbidopa/levodopa  25/100 2.5 Tablet(s) Oral <User Schedule>  carbidopa/levodopa  25/100 2 Tablet(s) Oral <User Schedule>  chlorhexidine 4% Liquid 1 Application(s) Topical <User Schedule>  cinacalcet 60 milliGRAM(s) Oral daily  dextrose 40% Gel 15 Gram(s) Oral once  dextrose 5%. 1000 milliLiter(s) IV Continuous <Continuous>  dextrose 5%. 1000 milliLiter(s) IV Continuous <Continuous>  dextrose 5%. 1000 milliLiter(s) IV Continuous <Continuous>  dextrose 50% Injectable 25 Gram(s) IV Push once  dextrose 50% Injectable 25 Gram(s) IV Push once  dextrose 50% Injectable 12.5 Gram(s) IV Push once  dextrose 50% Injectable 25 Gram(s) IV Push once  diVALproex Sprinkle 125 milliGRAM(s) Oral every 8 hours PRN  diVALproex Sprinkle 250 milliGRAM(s) Oral three times a day  DULoxetine 20 milliGRAM(s) Oral daily  folic acid 1 milliGRAM(s) Oral daily  glucagon  Injectable 1 milliGRAM(s) IntraMuscular once  guaiFENesin Oral Liquid (Sugar-Free) 200 milliGRAM(s) Oral every 6 hours PRN  insulin lispro (ADMELOG) corrective regimen sliding scale   SubCutaneous every 6 hours  levothyroxine Injectable 20 MICROGram(s) IV Push at bedtime  memantine 5 milliGRAM(s) Oral at bedtime  metoprolol tartrate Injectable 5 milliGRAM(s) IV Push every 6 hours  midodrine 10 milliGRAM(s) Oral <User Schedule>  mirtazapine 7.5 milliGRAM(s) Oral daily  Nephro-celeste 1 Tablet(s) Oral daily  pantoprazole Infusion 8 mG/Hr IV Continuous <Continuous>  QUEtiapine 50 milliGRAM(s) Oral at bedtime  QUEtiapine 12.5 milliGRAM(s) Oral every 6 hours PRN  sodium chloride 0.9% lock flush 10 milliLiter(s) IV Push every 1 hour PRN  tamsulosin 0.4 milliGRAM(s) Oral at bedtime  trihexyphenidyl 2 milliGRAM(s) Oral three times a day  vancomycin    Solution 125 milliGRAM(s) Oral every 6 hours        Review of Systems:  unable to obtain, patient confused     PHYSICAL EXAM:   Vital Signs:  Vital Signs Last 24 Hrs  T(C): 36.3 (16 Feb 2022 11:08), Max: 37.6 (15 Feb 2022 20:36)  T(F): 97.4 (16 Feb 2022 11:08), Max: 99.6 (15 Feb 2022 20:36)  HR: 99 (16 Feb 2022 11:08) (80 - 118)  BP: 90/56 (16 Feb 2022 11:08) (89/67 - 120/79)  BP(mean): --  RR: 18 (16 Feb 2022 11:08) (18 - 18)  SpO2: 99% (16 Feb 2022 11:08) (95% - 99%)  Daily     Daily       PHYSICAL EXAM:     GENERAL:  Appears stated age, well-groomed, well-nourished, no distress  HEENT:  NC/AT,  conjunctivae anicteric, clear and pink,   NECK: supple, trachea midline  CHEST:  Full & symmetric excursion, no increased effort, breath sounds clear  HEART:  Regular rhythm, no JVD  ABDOMEN:  Soft, non-tender, non-distended, normoactive bowel sounds,  no masses , no hepatosplenomegaly, +gastrostomy   EXTREMITIES:  no cyanosis,clubbing or edema  SKIN:  No rash, erythema, or, ecchymoses, no jaundice  NEURO:   non-focal, no asterixis  RECTAL: Deferred      LABS Personally reviewed by me:                        5.5    10.41 )-----------( 183      ( 16 Feb 2022 07:05 )             16.9     Mean Cell Volume: 100.6 fl (02-16-22 @ 07:05)    02-16    134<L>  |  98  |  68<H>  ----------------------------<  139<H>  4.2   |  25  |  2.50<H>    Ca    7.7<L>      16 Feb 2022 07:06    TPro  5.3<L>  /  Alb  1.8<L>  /  TBili  0.3  /  DBili  x   /  AST  21  /  ALT  <5<L>  /  AlkPhos  60  02-16    LIVER FUNCTIONS - ( 16 Feb 2022 07:06 )  Alb: 1.8 g/dL / Pro: 5.3 g/dL / ALK PHOS: 60 U/L / ALT: <5 U/L / AST: 21 U/L / GGT: x                                       5.5    10.41 )-----------( 183      ( 16 Feb 2022 07:05 )             16.9                         7.9    11.82 )-----------( 188      ( 15 Feb 2022 13:41 )             23.6                         5.1    12.52 )-----------( 184      ( 15 Feb 2022 10:18 )             15.4                         7.1    16.53 )-----------( 231      ( 14 Feb 2022 17:16 )             21.1                         6.0    16.23 )-----------( 217      ( 14 Feb 2022 09:04 )             18.2       Imaging personally reviewed by me:             Chief Complaint:  Patient is a 71y old  Male who presents with a chief complaint of leg swelling (16 Feb 2022 12:46)      Date of service 02-16-22 @ 13:16      Interval Events:   Patient seen and examined.   Hgb 5.5 this morning. Having black tarry stools. Currently receiving 1 unit PRBCs  Endoscopy today    Hospital Medications:  acetaminophen     Tablet .. 650 milliGRAM(s) Oral every 6 hours PRN  albuterol/ipratropium for Nebulization 3 milliLiter(s) Nebulizer every 6 hours PRN  atorvastatin 80 milliGRAM(s) Oral at bedtime  carbidopa/levodopa  25/100 2.5 Tablet(s) Oral <User Schedule>  carbidopa/levodopa  25/100 2 Tablet(s) Oral <User Schedule>  chlorhexidine 4% Liquid 1 Application(s) Topical <User Schedule>  cinacalcet 60 milliGRAM(s) Oral daily  dextrose 40% Gel 15 Gram(s) Oral once  dextrose 5%. 1000 milliLiter(s) IV Continuous <Continuous>  dextrose 5%. 1000 milliLiter(s) IV Continuous <Continuous>  dextrose 5%. 1000 milliLiter(s) IV Continuous <Continuous>  dextrose 50% Injectable 25 Gram(s) IV Push once  dextrose 50% Injectable 25 Gram(s) IV Push once  dextrose 50% Injectable 12.5 Gram(s) IV Push once  dextrose 50% Injectable 25 Gram(s) IV Push once  diVALproex Sprinkle 125 milliGRAM(s) Oral every 8 hours PRN  diVALproex Sprinkle 250 milliGRAM(s) Oral three times a day  DULoxetine 20 milliGRAM(s) Oral daily  folic acid 1 milliGRAM(s) Oral daily  glucagon  Injectable 1 milliGRAM(s) IntraMuscular once  guaiFENesin Oral Liquid (Sugar-Free) 200 milliGRAM(s) Oral every 6 hours PRN  insulin lispro (ADMELOG) corrective regimen sliding scale   SubCutaneous every 6 hours  levothyroxine Injectable 20 MICROGram(s) IV Push at bedtime  memantine 5 milliGRAM(s) Oral at bedtime  metoprolol tartrate Injectable 5 milliGRAM(s) IV Push every 6 hours  midodrine 10 milliGRAM(s) Oral <User Schedule>  mirtazapine 7.5 milliGRAM(s) Oral daily  Nephro-celeste 1 Tablet(s) Oral daily  pantoprazole Infusion 8 mG/Hr IV Continuous <Continuous>  QUEtiapine 50 milliGRAM(s) Oral at bedtime  QUEtiapine 12.5 milliGRAM(s) Oral every 6 hours PRN  sodium chloride 0.9% lock flush 10 milliLiter(s) IV Push every 1 hour PRN  tamsulosin 0.4 milliGRAM(s) Oral at bedtime  trihexyphenidyl 2 milliGRAM(s) Oral three times a day  vancomycin    Solution 125 milliGRAM(s) Oral every 6 hours        Review of Systems:  unable to obtain, patient confused     PHYSICAL EXAM:   Vital Signs:  Vital Signs Last 24 Hrs  T(C): 36.3 (16 Feb 2022 11:08), Max: 37.6 (15 Feb 2022 20:36)  T(F): 97.4 (16 Feb 2022 11:08), Max: 99.6 (15 Feb 2022 20:36)  HR: 99 (16 Feb 2022 11:08) (80 - 118)  BP: 90/56 (16 Feb 2022 11:08) (89/67 - 120/79)  BP(mean): --  RR: 18 (16 Feb 2022 11:08) (18 - 18)  SpO2: 99% (16 Feb 2022 11:08) (95% - 99%)  Daily     Daily       PHYSICAL EXAM:     GENERAL:  Appears stated age, well-groomed, well-nourished, no distress  HEENT:  NC/AT,  conjunctivae anicteric, clear and pink,   NECK: supple, trachea midline  CHEST:  Full & symmetric excursion, no increased effort, breath sounds clear  HEART:  Regular rhythm, no JVD  ABDOMEN:  Soft, non-tender, non-distended, normoactive bowel sounds,  no masses , no hepatosplenomegaly, +gastrostomy   EXTREMITIES:  no cyanosis,clubbing or edema  SKIN:  No rash, erythema, or, ecchymoses, no jaundice  NEURO:   non-focal, no asterixis  RECTAL: Deferred      LABS Personally reviewed by me:                        5.5    10.41 )-----------( 183      ( 16 Feb 2022 07:05 )             16.9     Mean Cell Volume: 100.6 fl (02-16-22 @ 07:05)    02-16    134<L>  |  98  |  68<H>  ----------------------------<  139<H>  4.2   |  25  |  2.50<H>    Ca    7.7<L>      16 Feb 2022 07:06    TPro  5.3<L>  /  Alb  1.8<L>  /  TBili  0.3  /  DBili  x   /  AST  21  /  ALT  <5<L>  /  AlkPhos  60  02-16    LIVER FUNCTIONS - ( 16 Feb 2022 07:06 )  Alb: 1.8 g/dL / Pro: 5.3 g/dL / ALK PHOS: 60 U/L / ALT: <5 U/L / AST: 21 U/L / GGT: x                                       5.5    10.41 )-----------( 183      ( 16 Feb 2022 07:05 )             16.9                         7.9    11.82 )-----------( 188      ( 15 Feb 2022 13:41 )             23.6                         5.1    12.52 )-----------( 184      ( 15 Feb 2022 10:18 )             15.4                         7.1    16.53 )-----------( 231      ( 14 Feb 2022 17:16 )             21.1                         6.0    16.23 )-----------( 217      ( 14 Feb 2022 09:04 )             18.2       Imaging personally reviewed by me:

## 2022-02-16 NOTE — PROGRESS NOTE ADULT - SUBJECTIVE AND OBJECTIVE BOX
Chief complaint  Patient is a 71y old  Male who presents with a chief complaint of leg swelling (16 Feb 2022 12:46)   Review of systems  Planning endoscopy for GIB, no hypoglycemic episodes.    Labs and Fingersticks  CAPILLARY BLOOD GLUCOSE      POCT Blood Glucose.: 156 mg/dL (16 Feb 2022 11:53)  POCT Blood Glucose.: 142 mg/dL (16 Feb 2022 05:42)  POCT Blood Glucose.: 171 mg/dL (15 Feb 2022 23:46)  POCT Blood Glucose.: 139 mg/dL (15 Feb 2022 17:39)      Anion Gap, Serum: 11 (02-16 @ 07:06)  Anion Gap, Serum: 14 (02-15 @ 10:18)      Calcium, Total Serum: 7.7 *L* (02-16 @ 07:06)  Calcium, Total Serum: 7.7 *L* (02-15 @ 10:18)  Albumin, Serum: 1.8 *L* (02-16 @ 07:06)    Alanine Aminotransferase (ALT/SGPT): <5 *L* (02-16 @ 07:06)  Alkaline Phosphatase, Serum: 60 (02-16 @ 07:06)  Aspartate Aminotransferase (AST/SGOT): 21 (02-16 @ 07:06)        02-16    134<L>  |  98  |  68<H>  ----------------------------<  139<H>  4.2   |  25  |  2.50<H>    Ca    7.7<L>      16 Feb 2022 07:06    TPro  5.3<L>  /  Alb  1.8<L>  /  TBili  0.3  /  DBili  x   /  AST  21  /  ALT  <5<L>  /  AlkPhos  60  02-16                        5.5    10.41 )-----------( 183      ( 16 Feb 2022 07:05 )             16.9     Medications  MEDICATIONS  (STANDING):  atorvastatin 80 milliGRAM(s) Oral at bedtime  carbidopa/levodopa  25/100 2.5 Tablet(s) Oral <User Schedule>  carbidopa/levodopa  25/100 2 Tablet(s) Oral <User Schedule>  chlorhexidine 4% Liquid 1 Application(s) Topical <User Schedule>  cinacalcet 60 milliGRAM(s) Oral daily  dextrose 40% Gel 15 Gram(s) Oral once  dextrose 5%. 1000 milliLiter(s) (50 mL/Hr) IV Continuous <Continuous>  dextrose 5%. 1000 milliLiter(s) (100 mL/Hr) IV Continuous <Continuous>  dextrose 5%. 1000 milliLiter(s) (40 mL/Hr) IV Continuous <Continuous>  dextrose 50% Injectable 25 Gram(s) IV Push once  dextrose 50% Injectable 25 Gram(s) IV Push once  dextrose 50% Injectable 12.5 Gram(s) IV Push once  dextrose 50% Injectable 25 Gram(s) IV Push once  diVALproex Sprinkle 250 milliGRAM(s) Oral three times a day  DULoxetine 20 milliGRAM(s) Oral daily  folic acid 1 milliGRAM(s) Oral daily  glucagon  Injectable 1 milliGRAM(s) IntraMuscular once  insulin lispro (ADMELOG) corrective regimen sliding scale   SubCutaneous every 6 hours  levothyroxine Injectable 20 MICROGram(s) IV Push at bedtime  memantine 5 milliGRAM(s) Oral at bedtime  metoprolol tartrate Injectable 5 milliGRAM(s) IV Push every 6 hours  midodrine 10 milliGRAM(s) Oral <User Schedule>  mirtazapine 7.5 milliGRAM(s) Oral daily  Nephro-celeste 1 Tablet(s) Oral daily  pantoprazole Infusion 8 mG/Hr (10 mL/Hr) IV Continuous <Continuous>  QUEtiapine 50 milliGRAM(s) Oral at bedtime  tamsulosin 0.4 milliGRAM(s) Oral at bedtime  trihexyphenidyl 2 milliGRAM(s) Oral three times a day  vancomycin    Solution 125 milliGRAM(s) Oral every 6 hours      Physical Exam  Vital Signs Last 12 Hrs  T(F): 97.4 (02-16-22 @ 11:08), Max: 99.6 (02-16-22 @ 04:31)  HR: 99 (02-16-22 @ 11:08) (80 - 99)  BP: 90/56 (02-16-22 @ 11:08) (89/67 - 90/56)  BP(mean): --  RR: 18 (02-16-22 @ 11:08) (18 - 18)  SpO2: 99% (02-16-22 @ 11:08) (95% - 99%)   Chief complaint  Patient is a 71y old  Male who presents with a chief complaint of leg swelling (16 Feb 2022 12:46)   Review of systems  Planning endoscopy for GIB, no hypoglycemic episodes.    Labs and Fingersticks  CAPILLARY BLOOD GLUCOSE      POCT Blood Glucose.: 156 mg/dL (16 Feb 2022 11:53)  POCT Blood Glucose.: 142 mg/dL (16 Feb 2022 05:42)  POCT Blood Glucose.: 171 mg/dL (15 Feb 2022 23:46)  POCT Blood Glucose.: 139 mg/dL (15 Feb 2022 17:39)      Anion Gap, Serum: 11 (02-16 @ 07:06)  Anion Gap, Serum: 14 (02-15 @ 10:18)      Calcium, Total Serum: 7.7 *L* (02-16 @ 07:06)  Calcium, Total Serum: 7.7 *L* (02-15 @ 10:18)  Albumin, Serum: 1.8 *L* (02-16 @ 07:06)    Alanine Aminotransferase (ALT/SGPT): <5 *L* (02-16 @ 07:06)  Alkaline Phosphatase, Serum: 60 (02-16 @ 07:06)  Aspartate Aminotransferase (AST/SGOT): 21 (02-16 @ 07:06)        02-16    134<L>  |  98  |  68<H>  ----------------------------<  139<H>  4.2   |  25  |  2.50<H>    Ca    7.7<L>      16 Feb 2022 07:06    TPro  5.3<L>  /  Alb  1.8<L>  /  TBili  0.3  /  DBili  x   /  AST  21  /  ALT  <5<L>  /  AlkPhos  60  02-16                        5.5    10.41 )-----------( 183      ( 16 Feb 2022 07:05 )             16.9     Medications  MEDICATIONS  (STANDING):  atorvastatin 80 milliGRAM(s) Oral at bedtime  carbidopa/levodopa  25/100 2.5 Tablet(s) Oral <User Schedule>  carbidopa/levodopa  25/100 2 Tablet(s) Oral <User Schedule>  chlorhexidine 4% Liquid 1 Application(s) Topical <User Schedule>  cinacalcet 60 milliGRAM(s) Oral daily  dextrose 40% Gel 15 Gram(s) Oral once  dextrose 5%. 1000 milliLiter(s) (50 mL/Hr) IV Continuous <Continuous>  dextrose 5%. 1000 milliLiter(s) (100 mL/Hr) IV Continuous <Continuous>  dextrose 5%. 1000 milliLiter(s) (40 mL/Hr) IV Continuous <Continuous>  dextrose 50% Injectable 25 Gram(s) IV Push once  dextrose 50% Injectable 25 Gram(s) IV Push once  dextrose 50% Injectable 12.5 Gram(s) IV Push once  dextrose 50% Injectable 25 Gram(s) IV Push once  diVALproex Sprinkle 250 milliGRAM(s) Oral three times a day  DULoxetine 20 milliGRAM(s) Oral daily  folic acid 1 milliGRAM(s) Oral daily  glucagon  Injectable 1 milliGRAM(s) IntraMuscular once  insulin lispro (ADMELOG) corrective regimen sliding scale   SubCutaneous every 6 hours  levothyroxine Injectable 20 MICROGram(s) IV Push at bedtime  memantine 5 milliGRAM(s) Oral at bedtime  metoprolol tartrate Injectable 5 milliGRAM(s) IV Push every 6 hours  midodrine 10 milliGRAM(s) Oral <User Schedule>  mirtazapine 7.5 milliGRAM(s) Oral daily  Nephro-celeste 1 Tablet(s) Oral daily  pantoprazole Infusion 8 mG/Hr (10 mL/Hr) IV Continuous <Continuous>  QUEtiapine 50 milliGRAM(s) Oral at bedtime  tamsulosin 0.4 milliGRAM(s) Oral at bedtime  trihexyphenidyl 2 milliGRAM(s) Oral three times a day  vancomycin    Solution 125 milliGRAM(s) Oral every 6 hours      Physical Exam  Vital Signs Last 12 Hrs  T(F): 97.4 (02-16-22 @ 11:08), Max: 99.6 (02-16-22 @ 04:31)  HR: 99 (02-16-22 @ 11:08) (80 - 99)  BP: 90/56 (02-16-22 @ 11:08) (89/67 - 90/56)  BP(mean): --  RR: 18 (02-16-22 @ 11:08) (18 - 18)  SpO2: 99% (02-16-22 @ 11:08) (95% - 99%)

## 2022-02-16 NOTE — CHART NOTE - NSCHARTNOTEFT_GEN_A_CORE
Patient is a 71y old  Male who presents with a chief complaint of leg swelling .    72yo M w/ PMHx of HTN,  CAD (s/p CABG 2019), CKD (unknown stage), DM2, Parkinson's Disease, depression presents with new onset acute heart failure exacerbation, NSTEMI, started on hep gtt, developed bl RP bleed, acute anemia. sp MICU course for hemorrhagic shock 2/2 RP bleed , on 10/28 sp IR embolization l4 and l5 lumbar artery, was admitted on 10/21/21 p/w LE swelling, c/b COVID; now newly ESRD-HD as of this admission, sp PermCath and AVF.        ROS:  Unable to ROS pt is intubated and sedated.   Allergies    adhesives (Rash)  latex (Urticaria)  No Known Drug Allergies    Intolerances        ANTIBIOTICS/RELEVANT:  antimicrobials  vancomycin    Solution 125 milliGRAM(s) Oral every 6 hours    immunologic:    OTHER:  acetaminophen     Tablet .. 650 milliGRAM(s) Oral every 6 hours PRN  albuterol/ipratropium for Nebulization 3 milliLiter(s) Nebulizer every 6 hours PRN  atorvastatin 80 milliGRAM(s) Oral at bedtime  carbidopa/levodopa  25/100 2.5 Tablet(s) Oral <User Schedule>  carbidopa/levodopa  25/100 2 Tablet(s) Oral <User Schedule>  chlorhexidine 4% Liquid 1 Application(s) Topical <User Schedule>  cinacalcet 60 milliGRAM(s) Oral daily  dextrose 40% Gel 15 Gram(s) Oral once  dextrose 5%. 1000 milliLiter(s) IV Continuous <Continuous>  dextrose 5%. 1000 milliLiter(s) IV Continuous <Continuous>  dextrose 5%. 1000 milliLiter(s) IV Continuous <Continuous>  dextrose 50% Injectable 25 Gram(s) IV Push once  dextrose 50% Injectable 25 Gram(s) IV Push once  dextrose 50% Injectable 12.5 Gram(s) IV Push once  dextrose 50% Injectable 25 Gram(s) IV Push once  diVALproex Sprinkle 125 milliGRAM(s) Oral every 8 hours PRN  diVALproex Sprinkle 250 milliGRAM(s) Oral three times a day  DULoxetine 20 milliGRAM(s) Oral daily  folic acid 1 milliGRAM(s) Oral daily  glucagon  Injectable 1 milliGRAM(s) IntraMuscular once  guaiFENesin Oral Liquid (Sugar-Free) 200 milliGRAM(s) Oral every 6 hours PRN  insulin lispro (ADMELOG) corrective regimen sliding scale   SubCutaneous every 6 hours  levothyroxine Injectable 20 MICROGram(s) IV Push at bedtime  memantine 5 milliGRAM(s) Oral at bedtime  metoprolol tartrate Injectable 5 milliGRAM(s) IV Push every 6 hours  midodrine 10 milliGRAM(s) Oral <User Schedule>  mirtazapine 7.5 milliGRAM(s) Oral daily  Nephro-celeste 1 Tablet(s) Oral daily  pantoprazole Infusion 8 mG/Hr IV Continuous <Continuous>  QUEtiapine 50 milliGRAM(s) Oral at bedtime  QUEtiapine 12.5 milliGRAM(s) Oral every 6 hours PRN  sodium chloride 0.9% lock flush 10 milliLiter(s) IV Push every 1 hour PRN  tamsulosin 0.4 milliGRAM(s) Oral at bedtime  trihexyphenidyl 2 milliGRAM(s) Oral three times a day      Objective:  Vital Signs Last 24 Hrs  T(C): 36.4 (16 Feb 2022 17:35), Max: 37.6 (15 Feb 2022 20:36)  T(F): 97.6 (16 Feb 2022 17:17), Max: 99.6 (15 Feb 2022 20:36)  HR: 110 (16 Feb 2022 17:35) (80 - 112)  BP: 104/71 (16 Feb 2022 17:35) (89/67 - 108/70)  RR: 18 (16 Feb 2022 17:35) (18 - 18)  SpO2: 99% (16 Feb 2022 11:08) (95% - 99%)    PHYSICAL EXAM:  General -   HEENT -   CV -   Resp -   Abdomen -   Extremities -   Skin -       LABS:                        7.0    13.23 )-----------( 177      ( 16 Feb 2022 15:23 )             20.9     02-16    134<L>  |  98  |  68<H>  ----------------------------<  139<H>  4.2   |  25  |  2.50<H>    Ca    7.7<L>      16 Feb 2022 07:06    TPro  5.3<L>  /  Alb  1.8<L>  /  TBili  0.3  /  DBili  x   /  AST  21  /  ALT  <5<L>  /  AlkPhos  60  02-16            MICROBIOLOGY:  Culture - Urine (01.25.22 @ 00:53)    Culture Results:   >100,000 CFU/ml Escherichia coli    Organism Identification: Escherichia coli    Culture - Blood (10.29.21 @ 14:55)    Specimen Source: .Blood Blood    Culture Results:   No Growth Final        RADIOLOGY & ADDITIONAL STUDIES:      PROCEDURE DATE:  01/24/2022      INTERPRETATION:  Noncontrast CT of the brain.    CLINICAL INDICATION:  Altered mental status    COMPARISON: CT brain 10/27/2021    FINDINGS:    Interval increase in size of right anterolateral parietal extra-axial   hyperdense mass with peripheral calcifications, currently 4.2 x 2.1 cm,   previously 3.4 x 1.9 cm. This likely represents a meningioma.    No underlying vasogenic edema.    No midline shift or effacement of basal cisterns. No hydrocephalus. No   brain edema or acute intracranial hemorrhage.    Chronic left inferior cerebellar hemisphere infarct. Mild white matter   microvascular ischemic disease.    Bilateral maxillary and right sphenoid sinus mucosal thickening. Mastoid   air cells clear.    IMPRESSION:    No acute intracranial hemorrhage or brain edema.    No midline shift or effacement of basal cisterns. No hydrocephalus.    Interval increase in size of right anterolateral parietal meningioma,   currently 4.2 x 2.1 cm, previously 3.4 x 1.9 cm.        PROCEDURE DATE:  11/23/2021      CLINICAL INFORMATION: Permacath position.    COMPARISON:  November 2, 2021.    TECHNIQUE:   AP Portable chest x-ray. Apices excluded from image.    INTERPRETATION:    Heart size and the mediastinum cannot be accurately evaluated on this projection. Median sternotomy sutures and surgical clips again seen.  Previous enteric tube not seen.  Right IJ approach permacath with tip in the right atrium.  The included right lung is clear.  Left basilar/retrocardiac opacity with obscuration of the left hemidiaphragm is not significantly changed.  No right pleural effusion seen.  No pneumothorax noted however the apices are excluded and not evaluated.  There is osteoarthritic degenerative change of the spine.      IMPRESSION:  Right IJ approach permacath with tip in the right atrium.    Left basilar and retrocardiac which may be due to a left pleural effusion with passive atelectasis, atelectasis of other cause, and/or pneumonia, not significantly changed.  --------------------------------------------------------------------------------------------------------------------------------------------------------------------------------------------------------------------------------------------------------------------------------------------------------------------------------------------------------------------------------------------------------------------------------------------------------------------    ASSESSMENT AND PLAN     72yo M w/ PMHx of HTN,  CAD (s/p CABG 2019), CKD (unknown stage), DM2, Parkinson's Disease, depression, dementia  who p/ on 10/21/21 p/w LE swelling, was found to be in new onset acute HF exacerbation, NSTEMI, was started on hep gtt, c/b b/l  RP bleed, acute anemia. sp MICU course for hemorrhagic shock 2/2 RP bleed , on 10/28 sp IR embolization l4 and l5 lumbar artery, hospital course c/b COVID; newly ESRD-HD as of this admission, sp PermCath and AVF now , s/p acute blood loss anemia 2/2 UGIB 2/2 duodenal ulcer w/ active bleeding vessel s/p endoscopy with epi now remains intubated post procedure and admitted to MICU for monitoring and management .     Neuro   Metabolic encephalopathy in setting of hemorrhagic shock   -s/p intubation for endoscopy 2/16 ; remains intubated for re-scope 2/17   -admit to MICU   -c/w propofol gtt ; wean as tolerate   -neuro checks     Parkinson's Disease   -below meds will resume when GI stable and clear by GI to take PO  c/w Sinemet 25/100 2.5 TAB 6 am and 2pm  c/w Sinemet 25/100 2 TAB 10pm   C/w Artane 2mg TID     Depression   -below meds will resume when GI stable and clear by GI to take PO  c/w Cymbalta 20 q D    c/w Divalproex  250TID   c/w Serequal 50mg Q HS   c/w remeron 7.5 q D      Dementia   -below meds will resume when GI stable and clear by GI to take PO  c/w Namenda 5mg q HS        CV  Hx  CAD s/p CABG 219 , NSTEMI, HTN, Acute heart failure  - metoprolol 5 mg IVp q 6 w/ parameters   -holding lasix ( last dose 2/13) in settinf of GIB   -resume Lipitor when stable to take PO      PULM  Acute resp failure in setting of massive GIB   -s/p intubation for airway protection , for endoscopy   -will remain intubated post procedure for close monitoring in MICU     GI  Acute blood loss anemia 2/2 UGIB 2/2 duodenal ulcer w/ active bleeding vessel   hgb drop from 7.1 to 5.1  s/p 2 PRBC with hgb repeat 7.9 , than repeat hgb 5.5 s/p 2 PRBC 7.0  -s/p Endoscopy   -hgb 5.5 this morning, currently receiving 1 unit PRBCs  -STAT CBC post transfusion ordered  -Dr. León spoke with family to discuss EGD today  -continue PPI infusion  -continue to hold TF    2. ABBY on CKD per renal  -on HD via permacath per renal, midodrine during dialysis  -AVF not functional   -s/p tunnelled HD catheter insertion by IR 2/10    3. RP bleed  -s/p Abdominal Angiography and Embolization on 10/29/2021 by Interventional radiology of l4and l5 lumbar artery     3. C-diff  -last BM last night, dark brown pastey   -on vanco      Problem: DM2 (diabetes mellitus, type 2).   ·  Plan: Will continue coverage scale, monitoring FS and FU.  Suggest DC on Tradjenta 5mg daily, discontinue prior hypoglycemic agents/insulin, endo FU 4 weeks.    Hypercalcemia.   ·  Plan: Will continue Sensipar 60mg daily, will continue monitoring and FU.    Hypothyroidism.   ·  Plan: Will continue synthroid 20 mcg IV daily for now.  Will continue monitoring TFTs and FU.  Suggest to FU outpatient, repeat TFTs in 4-6 weeks.    Parkinsons disease.   ·  Plan: Continue medications, monitoring, FU primary team recommendations. . Patient is a 71y old  Male who presents with a chief complaint of leg swelling .    70yo M w/ PMHx of HTN,  CAD (s/p CABG 2019), CKD (unknown stage), DM2, Parkinson's Disease, depression presents with new onset acute heart failure exacerbation, NSTEMI, started on hep gtt, developed bl RP bleed, acute anemia. sp MICU course for hemorrhagic shock 2/2 RP bleed , on 10/28 sp IR embolization l4 and l5 lumbar artery, was admitted on 10/21/21 p/w LE swelling, c/b COVID; now newly ESRD-HD as of this admission, sp PermCath and AVF.        ROS:  Unable to ROS pt is intubated and sedated.   Allergies    adhesives (Rash)  latex (Urticaria)  No Known Drug Allergies    Intolerances        ANTIBIOTICS/RELEVANT:  antimicrobials  vancomycin    Solution 125 milliGRAM(s) Oral every 6 hours    immunologic:    OTHER:  acetaminophen     Tablet .. 650 milliGRAM(s) Oral every 6 hours PRN  albuterol/ipratropium for Nebulization 3 milliLiter(s) Nebulizer every 6 hours PRN  atorvastatin 80 milliGRAM(s) Oral at bedtime  carbidopa/levodopa  25/100 2.5 Tablet(s) Oral <User Schedule>  carbidopa/levodopa  25/100 2 Tablet(s) Oral <User Schedule>  chlorhexidine 4% Liquid 1 Application(s) Topical <User Schedule>  cinacalcet 60 milliGRAM(s) Oral daily  dextrose 40% Gel 15 Gram(s) Oral once  dextrose 5%. 1000 milliLiter(s) IV Continuous <Continuous>  dextrose 5%. 1000 milliLiter(s) IV Continuous <Continuous>  dextrose 5%. 1000 milliLiter(s) IV Continuous <Continuous>  dextrose 50% Injectable 25 Gram(s) IV Push once  dextrose 50% Injectable 25 Gram(s) IV Push once  dextrose 50% Injectable 12.5 Gram(s) IV Push once  dextrose 50% Injectable 25 Gram(s) IV Push once  diVALproex Sprinkle 125 milliGRAM(s) Oral every 8 hours PRN  diVALproex Sprinkle 250 milliGRAM(s) Oral three times a day  DULoxetine 20 milliGRAM(s) Oral daily  folic acid 1 milliGRAM(s) Oral daily  glucagon  Injectable 1 milliGRAM(s) IntraMuscular once  guaiFENesin Oral Liquid (Sugar-Free) 200 milliGRAM(s) Oral every 6 hours PRN  insulin lispro (ADMELOG) corrective regimen sliding scale   SubCutaneous every 6 hours  levothyroxine Injectable 20 MICROGram(s) IV Push at bedtime  memantine 5 milliGRAM(s) Oral at bedtime  metoprolol tartrate Injectable 5 milliGRAM(s) IV Push every 6 hours  midodrine 10 milliGRAM(s) Oral <User Schedule>  mirtazapine 7.5 milliGRAM(s) Oral daily  Nephro-celeste 1 Tablet(s) Oral daily  pantoprazole Infusion 8 mG/Hr IV Continuous <Continuous>  QUEtiapine 50 milliGRAM(s) Oral at bedtime  QUEtiapine 12.5 milliGRAM(s) Oral every 6 hours PRN  sodium chloride 0.9% lock flush 10 milliLiter(s) IV Push every 1 hour PRN  tamsulosin 0.4 milliGRAM(s) Oral at bedtime  trihexyphenidyl 2 milliGRAM(s) Oral three times a day      Objective:  Vital Signs Last 24 Hrs  T(C): 36.4 (16 Feb 2022 17:35), Max: 37.6 (15 Feb 2022 20:36)  T(F): 97.6 (16 Feb 2022 17:17), Max: 99.6 (15 Feb 2022 20:36)  HR: 110 (16 Feb 2022 17:35) (80 - 112)  BP: 104/71 (16 Feb 2022 17:35) (89/67 - 108/70)  RR: 18 (16 Feb 2022 17:35) (18 - 18)  SpO2: 99% (16 Feb 2022 11:08) (95% - 99%)    PHYSICAL EXAM:  General -   HEENT -   CV -   Resp -   Abdomen -   Extremities -   Skin -       LABS:                        7.0    13.23 )-----------( 177      ( 16 Feb 2022 15:23 )             20.9     02-16    134<L>  |  98  |  68<H>  ----------------------------<  139<H>  4.2   |  25  |  2.50<H>    Ca    7.7<L>      16 Feb 2022 07:06    TPro  5.3<L>  /  Alb  1.8<L>  /  TBili  0.3  /  DBili  x   /  AST  21  /  ALT  <5<L>  /  AlkPhos  60  02-16            MICROBIOLOGY:  Culture - Urine (01.25.22 @ 00:53)    Culture Results:   >100,000 CFU/ml Escherichia coli    Organism Identification: Escherichia coli    Culture - Blood (10.29.21 @ 14:55)    Specimen Source: .Blood Blood    Culture Results:   No Growth Final        RADIOLOGY & ADDITIONAL STUDIES:      PROCEDURE DATE:  01/24/2022      INTERPRETATION:  Noncontrast CT of the brain.    CLINICAL INDICATION:  Altered mental status    COMPARISON: CT brain 10/27/2021    FINDINGS:    Interval increase in size of right anterolateral parietal extra-axial   hyperdense mass with peripheral calcifications, currently 4.2 x 2.1 cm,   previously 3.4 x 1.9 cm. This likely represents a meningioma.    No underlying vasogenic edema.    No midline shift or effacement of basal cisterns. No hydrocephalus. No   brain edema or acute intracranial hemorrhage.    Chronic left inferior cerebellar hemisphere infarct. Mild white matter   microvascular ischemic disease.    Bilateral maxillary and right sphenoid sinus mucosal thickening. Mastoid   air cells clear.    IMPRESSION:    No acute intracranial hemorrhage or brain edema.    No midline shift or effacement of basal cisterns. No hydrocephalus.    Interval increase in size of right anterolateral parietal meningioma,   currently 4.2 x 2.1 cm, previously 3.4 x 1.9 cm.        PROCEDURE DATE:  11/23/2021      CLINICAL INFORMATION: Permacath position.    COMPARISON:  November 2, 2021.    TECHNIQUE:   AP Portable chest x-ray. Apices excluded from image.    INTERPRETATION:    Heart size and the mediastinum cannot be accurately evaluated on this projection. Median sternotomy sutures and surgical clips again seen.  Previous enteric tube not seen.  Right IJ approach permacath with tip in the right atrium.  The included right lung is clear.  Left basilar/retrocardiac opacity with obscuration of the left hemidiaphragm is not significantly changed.  No right pleural effusion seen.  No pneumothorax noted however the apices are excluded and not evaluated.  There is osteoarthritic degenerative change of the spine.      IMPRESSION:  Right IJ approach permacath with tip in the right atrium.    Left basilar and retrocardiac which may be due to a left pleural effusion with passive atelectasis, atelectasis of other cause, and/or pneumonia, not significantly changed.  --------------------------------------------------------------------------------------------------------------------------------------------------------------------------------------------------------------------------------------------------------------------------------------------------------------------------------------------------------------------------------------------------------------------------------------------------------------------    ASSESSMENT AND PLAN     70yo M w/ PMHx of HTN,  CAD (s/p CABG 2019), CKD (unknown stage), DM2, Parkinson's Disease, depression, dementia  who p/ on 10/21/21 p/w LE swelling, was found to be in new onset acute HF exacerbation, NSTEMI, was started on hep gtt, c/b b/l  RP bleed, acute anemia. sp MICU course for hemorrhagic shock 2/2 RP bleed , on 10/28 sp IR embolization l4 and l5 lumbar artery, hospital course c/b COVID; newly ESRD-HD as of this admission, sp PermCath and AVF now , s/p acute blood loss anemia 2/2 UGIB 2/2 duodenal ulcer w/ active bleeding vessel s/p endoscopy with epi now remains intubated post procedure and admitted to MICU for monitoring and management .     Neuro   Metabolic encephalopathy in setting of hemorrhagic shock   -s/p intubation for endoscopy 2/16 ; remains intubated for re-scope 2/17   -admit to MICU   -c/w propofol gtt ; wean as tolerate   -neuro checks     Parkinson's Disease   -below meds will resume when GI stable and clear by GI to take PO  c/w Sinemet 25/100 2.5 TAB 6 am and 2pm  c/w Sinemet 25/100 2 TAB 10pm   C/w Artane 2mg TID     Depression   -below meds will resume when GI stable and clear by GI to take PO  c/w Cymbalta 20 q D    c/w Divalproex  250TID   c/w Serequal 50mg Q HS   c/w remeron 7.5 q D      Dementia   -below meds will resume when GI stable and clear by GI to take PO  c/w Namenda 5mg q HS        CV  Hx  CAD s/p CABG 219 , NSTEMI, HTN, Acute heart failure  - metoprolol 5 mg IVp q 6 w/ parameters   -holding lasix ( last dose 2/13) in settinf of GIB   -resume Lipitor when stable to take PO      PULM  Acute resp failure in setting of massive GIB   -s/p intubation for airway protection , for endoscopy   -will remain intubated post procedure for close monitoring in MICU     GI  Acute blood loss anemia 2/2 UGIB 2/2 duodenal ulcer w/ active bleeding vessel   hgb drop from 7.1 to 5.1  s/p 2 PRBC with hgb repeat 7.9 , than repeat hgb 5.5 s/p 2 PRBC 7.0; total 4 PRBC in last 12 hrs   -s/p Endoscopy w. findings of duadenal ulcer s.p tx w/ epi.  -hemodynamics stable for now however if re-bleeds will require IR intervention . Otherwise GI follow up in Am   -continue PPI infusion  -continue to hold TF  -GI input appreciated     Hx of b/l RP bleed during this admission   -s/p Abdominal Angiography and Embolization on 10/29/2021 by Interventional radiology of l4and l5 lumbar artery     3 C-diff  -+ 2/13   -on vanco 125mg q 6 hrs  via PEG          ABBY on CKD   -on HD via permacath ( placed 2/10 /22 R IJ )   - c/w midodrine 10 mg q prior to HD t/th/sat  -AVF not functional     Hypercalcemia.   Will resume  Sensipar 60mg daily when able to take PO       ENDO  DMT2  -NPO   -FS q 6 hrs with ISS q 6 hrs   ENDO: Suggest DC on Tradjenta 5mg daily, discontinue prior hypoglycemic agents/insulin, endo FU 4 weeks.      Hypothyroidism.    continue synthroid 20 mcg IV daily for now.  Will continue monitoring TFTs and FU.  Suggest to FU outpatient, repeat TFTs in 4-6 weeks.    HEME   Acute blood loss 2/2 UGIB 2/2 duodenal ulcer   -s/p 4 PRBC in last 12hrs   -hx of b/l RP bleed while on heparin gtt s/o embolization   -hold AC  -CBC q 6 hrs   -goal hgb > 7  -goal INR <1.5  -goal platelets  > 100    ID  Sepsis 2/2 c diff  -will resume vancomycin 125 mg q 6 hrs via PEG when GI stable   -trend WBC  -trend T curve     PPX  GI ppx ; PPI gtt  DVT ppx ; compression devices Patient is a 71y old  Male who presents with a chief complaint of leg swelling .      70yo M w/ PMHx of HTN,  CAD (s/p CABG 2019), CKD (unknown stage), DM2, Parkinson's Disease, depression, dementia  who p/ on 10/21/21 p/w LE swelling, was found to be in new onset acute HF exacerbation, NSTEMI, was started on hep gtt, c/b b/l  RP bleed, acute anemia. sp MICU course for hemorrhagic shock 2/2 RP bleed , on 10/28 sp IR embolization l4 and l5 lumbar artery, hospital course c/b COVID; newly ESRD-HD as of this admission, sp PermCath and AVF now , s/p acute blood loss anemia 2/2 UGIB 2/2 duodenal ulcer w/ active bleeding vessel s/p endoscopy with epi now remains intubated post procedure and admitted to MICU for monitoring and management .      ROS:  Unable to ROS pt is intubated and sedated.   Allergies    adhesives (Rash)  latex (Urticaria)  No Known Drug Allergies    Intolerances        ANTIBIOTICS/RELEVANT:  antimicrobials  vancomycin    Solution 125 milliGRAM(s) Oral every 6 hours    immunologic:    OTHER:  acetaminophen     Tablet .. 650 milliGRAM(s) Oral every 6 hours PRN  albuterol/ipratropium for Nebulization 3 milliLiter(s) Nebulizer every 6 hours PRN  atorvastatin 80 milliGRAM(s) Oral at bedtime  carbidopa/levodopa  25/100 2.5 Tablet(s) Oral <User Schedule>  carbidopa/levodopa  25/100 2 Tablet(s) Oral <User Schedule>  chlorhexidine 4% Liquid 1 Application(s) Topical <User Schedule>  cinacalcet 60 milliGRAM(s) Oral daily  dextrose 40% Gel 15 Gram(s) Oral once  dextrose 5%. 1000 milliLiter(s) IV Continuous <Continuous>  dextrose 5%. 1000 milliLiter(s) IV Continuous <Continuous>  dextrose 5%. 1000 milliLiter(s) IV Continuous <Continuous>  dextrose 50% Injectable 25 Gram(s) IV Push once  dextrose 50% Injectable 25 Gram(s) IV Push once  dextrose 50% Injectable 12.5 Gram(s) IV Push once  dextrose 50% Injectable 25 Gram(s) IV Push once  diVALproex Sprinkle 125 milliGRAM(s) Oral every 8 hours PRN  diVALproex Sprinkle 250 milliGRAM(s) Oral three times a day  DULoxetine 20 milliGRAM(s) Oral daily  folic acid 1 milliGRAM(s) Oral daily  glucagon  Injectable 1 milliGRAM(s) IntraMuscular once  guaiFENesin Oral Liquid (Sugar-Free) 200 milliGRAM(s) Oral every 6 hours PRN  insulin lispro (ADMELOG) corrective regimen sliding scale   SubCutaneous every 6 hours  levothyroxine Injectable 20 MICROGram(s) IV Push at bedtime  memantine 5 milliGRAM(s) Oral at bedtime  metoprolol tartrate Injectable 5 milliGRAM(s) IV Push every 6 hours  midodrine 10 milliGRAM(s) Oral <User Schedule>  mirtazapine 7.5 milliGRAM(s) Oral daily  Nephro-celeste 1 Tablet(s) Oral daily  pantoprazole Infusion 8 mG/Hr IV Continuous <Continuous>  QUEtiapine 50 milliGRAM(s) Oral at bedtime  QUEtiapine 12.5 milliGRAM(s) Oral every 6 hours PRN  sodium chloride 0.9% lock flush 10 milliLiter(s) IV Push every 1 hour PRN  tamsulosin 0.4 milliGRAM(s) Oral at bedtime  trihexyphenidyl 2 milliGRAM(s) Oral three times a day      Objective:  Vital Signs Last 24 Hrs  T(C): 36.4 (16 Feb 2022 17:35), Max: 37.6 (15 Feb 2022 20:36)  T(F): 97.6 (16 Feb 2022 17:17), Max: 99.6 (15 Feb 2022 20:36)  HR: 110 (16 Feb 2022 17:35) (80 - 112)  BP: 104/71 (16 Feb 2022 17:35) (89/67 - 108/70)  RR: 18 (16 Feb 2022 17:35) (18 - 18)  SpO2: 99% (16 Feb 2022 11:08) (95% - 99%)    PHYSICAL EXAM:  General -   HEENT -   CV -   Resp -   Abdomen -   Extremities -   Skin -       LABS:                        7.0    13.23 )-----------( 177      ( 16 Feb 2022 15:23 )             20.9     02-16    134<L>  |  98  |  68<H>  ----------------------------<  139<H>  4.2   |  25  |  2.50<H>    Ca    7.7<L>      16 Feb 2022 07:06    TPro  5.3<L>  /  Alb  1.8<L>  /  TBili  0.3  /  DBili  x   /  AST  21  /  ALT  <5<L>  /  AlkPhos  60  02-16            MICROBIOLOGY:  Culture - Urine (01.25.22 @ 00:53)    Culture Results:   >100,000 CFU/ml Escherichia coli    Organism Identification: Escherichia coli    Culture - Blood (10.29.21 @ 14:55)    Specimen Source: .Blood Blood    Culture Results:   No Growth Final        RADIOLOGY & ADDITIONAL STUDIES:      PROCEDURE DATE:  01/24/2022      INTERPRETATION:  Noncontrast CT of the brain.    CLINICAL INDICATION:  Altered mental status    COMPARISON: CT brain 10/27/2021    FINDINGS:    Interval increase in size of right anterolateral parietal extra-axial   hyperdense mass with peripheral calcifications, currently 4.2 x 2.1 cm,   previously 3.4 x 1.9 cm. This likely represents a meningioma.    No underlying vasogenic edema.    No midline shift or effacement of basal cisterns. No hydrocephalus. No   brain edema or acute intracranial hemorrhage.    Chronic left inferior cerebellar hemisphere infarct. Mild white matter   microvascular ischemic disease.    Bilateral maxillary and right sphenoid sinus mucosal thickening. Mastoid   air cells clear.    IMPRESSION:    No acute intracranial hemorrhage or brain edema.    No midline shift or effacement of basal cisterns. No hydrocephalus.    Interval increase in size of right anterolateral parietal meningioma,   currently 4.2 x 2.1 cm, previously 3.4 x 1.9 cm.        PROCEDURE DATE:  11/23/2021      CLINICAL INFORMATION: Permacath position.    COMPARISON:  November 2, 2021.    TECHNIQUE:   AP Portable chest x-ray. Apices excluded from image.    INTERPRETATION:    Heart size and the mediastinum cannot be accurately evaluated on this projection. Median sternotomy sutures and surgical clips again seen.  Previous enteric tube not seen.  Right IJ approach permacath with tip in the right atrium.  The included right lung is clear.  Left basilar/retrocardiac opacity with obscuration of the left hemidiaphragm is not significantly changed.  No right pleural effusion seen.  No pneumothorax noted however the apices are excluded and not evaluated.  There is osteoarthritic degenerative change of the spine.      IMPRESSION:  Right IJ approach permacath with tip in the right atrium.    Left basilar and retrocardiac which may be due to a left pleural effusion with passive atelectasis, atelectasis of other cause, and/or pneumonia, not significantly changed.  --------------------------------------------------------------------------------------------------------------------------------------------------------------------------------------------------------------------------------------------------------------------------------------------------------------------------------------------------------------------------------------------------------------------------------------------------------------------    ASSESSMENT AND PLAN        Neuro   Metabolic encephalopathy in setting of hemorrhagic shock   -s/p intubation for endoscopy 2/16 ; remains intubated for re-scope 2/17   -admit to MICU   -c/w propofol gtt ; wean as tolerate   -neuro checks     Parkinson's Disease   -below meds will resume when GI stable and clear by GI to take PO  c/w Sinemet 25/100 2.5 TAB 6 am and 2pm  c/w Sinemet 25/100 2 TAB 10pm   C/w Artane 2mg TID     Depression   -below meds will resume when GI stable and clear by GI to take PO  c/w Cymbalta 20 q D    c/w Divalproex  250TID   c/w Serequal 50mg Q HS   c/w remeron 7.5 q D      Dementia   -below meds will resume when GI stable and clear by GI to take PO  c/w Namenda 5mg q HS        CV  Hx  CAD s/p CABG 219 , NSTEMI, HTN, Acute heart failure  - metoprolol 5 mg IVp q 6 w/ parameters   -holding lasix ( last dose 2/13) in settinf of GIB   -resume Lipitor when stable to take PO      PULM  Acute resp failure in setting of massive GIB   -s/p intubation for airway protection , for endoscopy   -will remain intubated post procedure for close monitoring in MICU   -aspiration precaution     GI  Acute blood loss anemia 2/2 UGIB 2/2 duodenal ulcer w/ active bleeding vessel   hgb drop from 7.1 to 5.1  s/p 2 PRBC with hgb repeat 7.9 , than repeat hgb 5.5 s/p 2 PRBC 7.0; total 4 PRBC in last 12 hrs   -s/p Endoscopy w. findings of duadenal ulcer s.p tx w/ epi.  -hemodynamics stable for now however if re-bleeds will require IR intervention . Otherwise GI follow up in Am   -continue PPI infusion  -continue to hold TF  -GI input appreciated     Hx of b/l RP bleed during this admission   -s/p Abdominal Angiography and Embolization on 10/29/2021 by Interventional radiology of l4and l5 lumbar artery     3 C-diff  -+ 2/13   -on vanco 125mg q 6 hrs  via PEG          ABBY on CKD   -still makes urine   -on HD via permacath ( placed 2/10 /22 R IJ )   - c/w midodrine 10 mg q prior to HD t/th/sat  -AVF not functional   c/w johnson cath ; will re-eval in am if able to d/c     Hypercalcemia.   Will resume  Sensipar 60mg daily when able to take PO     BPH   c/w johnson cath   -c/w flomax       ENDO  DMT2  -NPO on d5 @ 40   -FS q 6 hrs with ISS q 6 hrs   ENDO: Suggest DC on Tradjenta 5mg daily, discontinue prior hypoglycemic agents/insulin, endo FU 4 weeks.      Hypothyroidism.    continue synthroid 20 mcg IV daily for now.  Will continue monitoring TFTs and FU.  Suggest to FU outpatient, repeat TFTs in 4-6 weeks.    HEME   Acute blood loss 2/2 UGIB 2/2 duodenal ulcer   -s/p 4 PRBC in last 12hrs   -hx of b/l RP bleed while on heparin gtt s/o embolization   -hold AC  -c/ w folic acid   -CBC q 6 hrs   -goal hgb > 7  -goal INR <1.5  -goal platelets  > 100    ID  Sepsis 2/2 c diff  -will resume vancomycin 125 mg q 6 hrs via PEG when GI stable   -trend WBC  -trend T curve     PPX  GI ppx ; PPI gtt  DVT ppx ; compression devices Patient is a 71y old  Male who presents with a chief complaint of leg swelling .      72yo M w/ PMHx of HTN,  CAD (s/p CABG 2019), CKD (unknown stage), DM2, Parkinson's Disease, depression, dementia  who p/ on 10/21/21 p/w LE swelling, was found to be in new onset acute HF exacerbation, NSTEMI, was started on hep gtt, c/b b/l  RP bleed, acute anemia. sp MICU course for hemorrhagic shock 2/2 RP bleed , on 10/28 sp IR embolization l4 and l5 lumbar artery, hospital course c/b COVID; newly ESRD-HD as of this admission, sp PermCath and AVF now , s/p acute blood loss anemia 2/2 UGIB 2/2 duodenal ulcer w/ active bleeding vessel s/p endoscopy with epi now remains intubated post procedure and admitted to MICU for monitoring and management .      ROS:  Unable to ROS pt is intubated and sedated.   Allergies    adhesives (Rash)  latex (Urticaria)  No Known Drug Allergies    Intolerances        ANTIBIOTICS/RELEVANT:  antimicrobials  vancomycin    Solution 125 milliGRAM(s) Oral every 6 hours    immunologic:    OTHER:  acetaminophen     Tablet .. 650 milliGRAM(s) Oral every 6 hours PRN  albuterol/ipratropium for Nebulization 3 milliLiter(s) Nebulizer every 6 hours PRN  atorvastatin 80 milliGRAM(s) Oral at bedtime  carbidopa/levodopa  25/100 2.5 Tablet(s) Oral <User Schedule>  carbidopa/levodopa  25/100 2 Tablet(s) Oral <User Schedule>  chlorhexidine 4% Liquid 1 Application(s) Topical <User Schedule>  cinacalcet 60 milliGRAM(s) Oral daily  dextrose 40% Gel 15 Gram(s) Oral once  dextrose 5%. 1000 milliLiter(s) IV Continuous <Continuous>  dextrose 5%. 1000 milliLiter(s) IV Continuous <Continuous>  dextrose 5%. 1000 milliLiter(s) IV Continuous <Continuous>  dextrose 50% Injectable 25 Gram(s) IV Push once  dextrose 50% Injectable 25 Gram(s) IV Push once  dextrose 50% Injectable 12.5 Gram(s) IV Push once  dextrose 50% Injectable 25 Gram(s) IV Push once  diVALproex Sprinkle 125 milliGRAM(s) Oral every 8 hours PRN  diVALproex Sprinkle 250 milliGRAM(s) Oral three times a day  DULoxetine 20 milliGRAM(s) Oral daily  folic acid 1 milliGRAM(s) Oral daily  glucagon  Injectable 1 milliGRAM(s) IntraMuscular once  guaiFENesin Oral Liquid (Sugar-Free) 200 milliGRAM(s) Oral every 6 hours PRN  insulin lispro (ADMELOG) corrective regimen sliding scale   SubCutaneous every 6 hours  levothyroxine Injectable 20 MICROGram(s) IV Push at bedtime  memantine 5 milliGRAM(s) Oral at bedtime  metoprolol tartrate Injectable 5 milliGRAM(s) IV Push every 6 hours  midodrine 10 milliGRAM(s) Oral <User Schedule>  mirtazapine 7.5 milliGRAM(s) Oral daily  Nephro-celeste 1 Tablet(s) Oral daily  pantoprazole Infusion 8 mG/Hr IV Continuous <Continuous>  QUEtiapine 50 milliGRAM(s) Oral at bedtime  QUEtiapine 12.5 milliGRAM(s) Oral every 6 hours PRN  sodium chloride 0.9% lock flush 10 milliLiter(s) IV Push every 1 hour PRN  tamsulosin 0.4 milliGRAM(s) Oral at bedtime  trihexyphenidyl 2 milliGRAM(s) Oral three times a day      Objective:  Vital Signs Last 24 Hrs  T(C): 36.4 (16 Feb 2022 17:35), Max: 37.6 (15 Feb 2022 20:36)  T(F): 97.6 (16 Feb 2022 17:17), Max: 99.6 (15 Feb 2022 20:36)  HR: 110 (16 Feb 2022 17:35) (80 - 112)  BP: 104/71 (16 Feb 2022 17:35) (89/67 - 108/70)  RR: 18 (16 Feb 2022 17:35) (18 - 18)  SpO2: 99% (16 Feb 2022 11:08) (95% - 99%)    PHYSICAL EXAM:  General -   HEENT -   CV -   Resp -   Abdomen -   Extremities -   Skin -       LABS:                        7.0    13.23 )-----------( 177      ( 16 Feb 2022 15:23 )             20.9     02-16    134<L>  |  98  |  68<H>  ----------------------------<  139<H>  4.2   |  25  |  2.50<H>    Ca    7.7<L>      16 Feb 2022 07:06    TPro  5.3<L>  /  Alb  1.8<L>  /  TBili  0.3  /  DBili  x   /  AST  21  /  ALT  <5<L>  /  AlkPhos  60  02-16            MICROBIOLOGY:  Culture - Urine (01.25.22 @ 00:53)    Culture Results:   >100,000 CFU/ml Escherichia coli    Organism Identification: Escherichia coli    Culture - Blood (10.29.21 @ 14:55)    Specimen Source: .Blood Blood    Culture Results:   No Growth Final        RADIOLOGY & ADDITIONAL STUDIES:      PROCEDURE DATE:  01/24/2022      INTERPRETATION:  Noncontrast CT of the brain.    CLINICAL INDICATION:  Altered mental status    COMPARISON: CT brain 10/27/2021    FINDINGS:    Interval increase in size of right anterolateral parietal extra-axial   hyperdense mass with peripheral calcifications, currently 4.2 x 2.1 cm,   previously 3.4 x 1.9 cm. This likely represents a meningioma.    No underlying vasogenic edema.    No midline shift or effacement of basal cisterns. No hydrocephalus. No   brain edema or acute intracranial hemorrhage.    Chronic left inferior cerebellar hemisphere infarct. Mild white matter   microvascular ischemic disease.    Bilateral maxillary and right sphenoid sinus mucosal thickening. Mastoid   air cells clear.    IMPRESSION:    No acute intracranial hemorrhage or brain edema.    No midline shift or effacement of basal cisterns. No hydrocephalus.    Interval increase in size of right anterolateral parietal meningioma,   currently 4.2 x 2.1 cm, previously 3.4 x 1.9 cm.        PROCEDURE DATE:  11/23/2021      CLINICAL INFORMATION: Permacath position.    COMPARISON:  November 2, 2021.    TECHNIQUE:   AP Portable chest x-ray. Apices excluded from image.    INTERPRETATION:    Heart size and the mediastinum cannot be accurately evaluated on this projection. Median sternotomy sutures and surgical clips again seen.  Previous enteric tube not seen.  Right IJ approach permacath with tip in the right atrium.  The included right lung is clear.  Left basilar/retrocardiac opacity with obscuration of the left hemidiaphragm is not significantly changed.  No right pleural effusion seen.  No pneumothorax noted however the apices are excluded and not evaluated.  There is osteoarthritic degenerative change of the spine.      IMPRESSION:  Right IJ approach permacath with tip in the right atrium.    Left basilar and retrocardiac which may be due to a left pleural effusion with passive atelectasis, atelectasis of other cause, and/or pneumonia, not significantly changed.  --------------------------------------------------------------------------------------------------------------------------------------------------------------------------------------------------------------------------------------------------------------------------------------------------------------------------------------------------------------------------------------------------------------------------------------------------------------------    ASSESSMENT AND PLAN        Neuro   Metabolic encephalopathy in setting of hemorrhagic shock   -s/p intubation for endoscopy 2/16 ; remains intubated for re-scope 2/17   -admit to MICU   -c/w propofol gtt ; wean as tolerate   -neuro checks     Parkinson's Disease   -below meds will resume when GI stable and clear by GI to take PO  c/w Sinemet 25/100 2.5 TAB 6 am and 2pm  c/w Sinemet 25/100 2 TAB 10pm   C/w Artane 2mg TID     Depression   -below meds will resume when GI stable and clear by GI to take PO  c/w Cymbalta 20 q D    c/w Divalproex  250TID   c/w Serequal 50mg Q HS   c/w remeron 7.5 q D      Dementia   -below meds will resume when GI stable and clear by GI to take PO  c/w Namenda 5mg q HS        CV  Hx  CAD s/p CABG 219 , NSTEMI, HTN, Acute heart failure  - metoprolol 5 mg IVp q 6 w/ parameters   -holding lasix ( last dose 2/13) in settinf of GIB   -resume Lipitor when stable to take PO      PULM  Acute resp failure in setting of massive GIB   -s/p intubation for airway protection , for endoscopy   -will remain intubated post procedure for close monitoring in MICU   -aspiration precaution     GI  Acute blood loss anemia 2/2 UGIB 2/2 duodenal ulcer w/ active bleeding vessel   hgb drop from 7.1 to 5.1  s/p 2 PRBC with hgb repeat 7.9 , than repeat hgb 5.5 s/p 2 PRBC 7.0; total 4 PRBC in last 12 hrs   -s/p Endoscopy w. findings of duadenal ulcer s.p tx w/ epi.  -hemodynamics stable for now however if re-bleeds will require IR intervention . Otherwise GI follow up in Am   -continue PPI infusion  -continue to hold TF  -GI input appreciated     Hx of b/l RP bleed during this admission   -s/p Abdominal Angiography and Embolization on 10/29/2021 by Interventional radiology of l4and l5 lumbar artery     3 C-diff  -+ 2/13   -on vanco 125mg q 6 hrs  via PEG          ABBY on CKD   -still makes urine   -on HD via permacath ( placed 2/10 /22 R IJ )   - c/w midodrine 10 mg q prior to HD t/th/sat  -c/w nephro nina q d   -AVF not functional   c/w johnson cath ; will re-eval in am if able to d/c     Hypercalcemia.   Will resume  Sensipar 60mg daily via peg    BPH   c/w johnson cath   -c/w flomax       ENDO  DMT2  -NPO on d5 @ 40   -FS q 6 hrs with ISS q 6 hrs   ENDO: Suggest DC on Tradjenta 5mg daily, discontinue prior hypoglycemic agents/insulin, endo FU 4 weeks.      Hypothyroidism.    continue synthroid 20 mcg IV daily for now.  Will continue monitoring TFTs and FU.  Suggest to FU outpatient, repeat TFTs in 4-6 weeks.    HEME   Acute blood loss 2/2 UGIB 2/2 duodenal ulcer   -s/p 4 PRBC in last 12hrs   -hx of b/l RP bleed while on heparin gtt s/o embolization   -hold AC  -c/ w folic acid   -CBC q 6 hrs   -goal hgb > 7  -goal INR <1.5  -goal platelets  > 100    ID  Sepsis 2/2 c diff  -will resume vancomycin 125 mg q 6 hrs via PEG when GI stable   -trend WBC  -trend T curve     PPX  GI ppx ; PPI gtt  DVT ppx ; compression devices      ----------------------Attending attestation----------------  72yo M w/ PMHx of HTN,  CAD (s/p CABG 2019), CKD (unknown stage), DM2, Parkinson's Disease, depression, dementia  who p/ on 10/21/21 p/w LE swelling, was found to be in new onset acute HF exacerbation, NSTEMI, was started on hep gtt, c/b b/l  RP bleed, acute anemia. sp MICU course for hemorrhagic shock 2/2 RP bleed , on 10/28 sp IR embolization l4 and l5 lumbar artery, hospital course c/b COVID; newly ESRD-HD as of this admission, sp PermCath and AVF now , s/p acute blood loss anemia 2/2 UGIB 2/2 duodenal ulcer w/ active bleeding vessel s/p endoscopy with epi now remains intubated post procedure and admitted to MICU for monitoring and management .    Overall patient has been slowly dying from multiple complication from his chronic multiogan failure(CHF, Dementria, ESRD).  His over all prognosis is terrible and he has been hospitalized for over 3 months.  Per report GOC conversations have been fruitless.    He is now in MICU post High risk GIB from GDA territory bleeding duodenal ulcer.  With plan to observe overnight and extubate if stable.   This is his second attempts to exsanguinate this hospitalization would say he is not a candidate for a/c.   DVT PPX with SCDs.  patient is not in shock or respiratory failure but requires frequent monitoring for potential massive hemorrhage.   Critical care time provided 35 min Patient is a 71y old  Male who presents with a chief complaint of leg swelling .      72yo M w/ PMHx of HTN,  CAD (s/p CABG 2019), CKD (unknown stage), DM2, Parkinson's Disease, depression, dementia  who p/ on 10/21/21 p/w LE swelling, was found to be in new onset acute HF exacerbation, NSTEMI, was started on hep gtt, c/b b/l  RP bleed, acute anemia. sp MICU course for hemorrhagic shock 2/2 RP bleed , on 10/28 sp IR embolization l4 and l5 lumbar artery, hospital course c/b COVID; newly ESRD-HD as of this admission, sp PermCath and AVF now , s/p acute blood loss anemia 2/2 UGIB 2/2 duodenal ulcer w/ active bleeding vessel s/p endoscopy with epi now remains intubated post procedure and admitted to MICU for monitoring and management .      ROS:  Unable to ROS pt is intubated and sedated.   Allergies    adhesives (Rash)  latex (Urticaria)  No Known Drug Allergies    Intolerances        ANTIBIOTICS/RELEVANT:  antimicrobials  vancomycin    Solution 125 milliGRAM(s) Oral every 6 hours    immunologic:    OTHER:  acetaminophen     Tablet .. 650 milliGRAM(s) Oral every 6 hours PRN  albuterol/ipratropium for Nebulization 3 milliLiter(s) Nebulizer every 6 hours PRN  atorvastatin 80 milliGRAM(s) Oral at bedtime  carbidopa/levodopa  25/100 2.5 Tablet(s) Oral <User Schedule>  carbidopa/levodopa  25/100 2 Tablet(s) Oral <User Schedule>  chlorhexidine 4% Liquid 1 Application(s) Topical <User Schedule>  cinacalcet 60 milliGRAM(s) Oral daily  dextrose 40% Gel 15 Gram(s) Oral once  dextrose 5%. 1000 milliLiter(s) IV Continuous <Continuous>  dextrose 5%. 1000 milliLiter(s) IV Continuous <Continuous>  dextrose 5%. 1000 milliLiter(s) IV Continuous <Continuous>  dextrose 50% Injectable 25 Gram(s) IV Push once  dextrose 50% Injectable 25 Gram(s) IV Push once  dextrose 50% Injectable 12.5 Gram(s) IV Push once  dextrose 50% Injectable 25 Gram(s) IV Push once  diVALproex Sprinkle 125 milliGRAM(s) Oral every 8 hours PRN  diVALproex Sprinkle 250 milliGRAM(s) Oral three times a day  DULoxetine 20 milliGRAM(s) Oral daily  folic acid 1 milliGRAM(s) Oral daily  glucagon  Injectable 1 milliGRAM(s) IntraMuscular once  guaiFENesin Oral Liquid (Sugar-Free) 200 milliGRAM(s) Oral every 6 hours PRN  insulin lispro (ADMELOG) corrective regimen sliding scale   SubCutaneous every 6 hours  levothyroxine Injectable 20 MICROGram(s) IV Push at bedtime  memantine 5 milliGRAM(s) Oral at bedtime  metoprolol tartrate Injectable 5 milliGRAM(s) IV Push every 6 hours  midodrine 10 milliGRAM(s) Oral <User Schedule>  mirtazapine 7.5 milliGRAM(s) Oral daily  Nephro-celeste 1 Tablet(s) Oral daily  pantoprazole Infusion 8 mG/Hr IV Continuous <Continuous>  QUEtiapine 50 milliGRAM(s) Oral at bedtime  QUEtiapine 12.5 milliGRAM(s) Oral every 6 hours PRN  sodium chloride 0.9% lock flush 10 milliLiter(s) IV Push every 1 hour PRN  tamsulosin 0.4 milliGRAM(s) Oral at bedtime  trihexyphenidyl 2 milliGRAM(s) Oral three times a day      Objective:  Vital Signs Last 24 Hrs  T(C): 36.4 (16 Feb 2022 17:35), Max: 37.6 (15 Feb 2022 20:36)  T(F): 97.6 (16 Feb 2022 17:17), Max: 99.6 (15 Feb 2022 20:36)  HR: 110 (16 Feb 2022 17:35) (80 - 112)  BP: 104/71 (16 Feb 2022 17:35) (89/67 - 108/70)  RR: 18 (16 Feb 2022 17:35) (18 - 18)  SpO2: 99% (16 Feb 2022 11:08) (95% - 99%)    PHYSICAL EXAM:  General -   HEENT -   CV -   Resp -   Abdomen -   Extremities -   Skin -       LABS:                        7.0    13.23 )-----------( 177      ( 16 Feb 2022 15:23 )             20.9     02-16    134<L>  |  98  |  68<H>  ----------------------------<  139<H>  4.2   |  25  |  2.50<H>    Ca    7.7<L>      16 Feb 2022 07:06    TPro  5.3<L>  /  Alb  1.8<L>  /  TBili  0.3  /  DBili  x   /  AST  21  /  ALT  <5<L>  /  AlkPhos  60  02-16            MICROBIOLOGY:  Culture - Urine (01.25.22 @ 00:53)    Culture Results:   >100,000 CFU/ml Escherichia coli    Organism Identification: Escherichia coli    Culture - Blood (10.29.21 @ 14:55)    Specimen Source: .Blood Blood    Culture Results:   No Growth Final        RADIOLOGY & ADDITIONAL STUDIES:      PROCEDURE DATE:  01/24/2022      INTERPRETATION:  Noncontrast CT of the brain.    CLINICAL INDICATION:  Altered mental status    COMPARISON: CT brain 10/27/2021    FINDINGS:    Interval increase in size of right anterolateral parietal extra-axial   hyperdense mass with peripheral calcifications, currently 4.2 x 2.1 cm,   previously 3.4 x 1.9 cm. This likely represents a meningioma.    No underlying vasogenic edema.    No midline shift or effacement of basal cisterns. No hydrocephalus. No   brain edema or acute intracranial hemorrhage.    Chronic left inferior cerebellar hemisphere infarct. Mild white matter   microvascular ischemic disease.    Bilateral maxillary and right sphenoid sinus mucosal thickening. Mastoid   air cells clear.    IMPRESSION:    No acute intracranial hemorrhage or brain edema.    No midline shift or effacement of basal cisterns. No hydrocephalus.    Interval increase in size of right anterolateral parietal meningioma,   currently 4.2 x 2.1 cm, previously 3.4 x 1.9 cm.        PROCEDURE DATE:  11/23/2021      CLINICAL INFORMATION: Permacath position.    COMPARISON:  November 2, 2021.    TECHNIQUE:   AP Portable chest x-ray. Apices excluded from image.    INTERPRETATION:    Heart size and the mediastinum cannot be accurately evaluated on this projection. Median sternotomy sutures and surgical clips again seen.  Previous enteric tube not seen.  Right IJ approach permacath with tip in the right atrium.  The included right lung is clear.  Left basilar/retrocardiac opacity with obscuration of the left hemidiaphragm is not significantly changed.  No right pleural effusion seen.  No pneumothorax noted however the apices are excluded and not evaluated.  There is osteoarthritic degenerative change of the spine.      IMPRESSION:  Right IJ approach permacath with tip in the right atrium.    Left basilar and retrocardiac which may be due to a left pleural effusion with passive atelectasis, atelectasis of other cause, and/or pneumonia, not significantly changed.  --------------------------------------------------------------------------------------------------------------------------------------------------------------------------------------------------------------------------------------------------------------------------------------------------------------------------------------------------------------------------------------------------------------------------------------------------------------------    ASSESSMENT AND PLAN        Neuro   Metabolic encephalopathy in setting of hemorrhagic shock   -s/p intubation for endoscopy 2/16 ; remains intubated for re-scope 2/17   -admit to MICU   -c/w propofol gtt ; wean as tolerate   -neuro checks     Parkinson's Disease   -below meds will resume when GI stable and clear by GI to take PO  c/w Sinemet 25/100 2.5 TAB 6 am and 2pm  c/w Sinemet 25/100 2 TAB 10pm   C/w Artane 2mg TID     Depression   -below meds will resume when GI stable and clear by GI to take PO  c/w Cymbalta 20 q D    c/w Divalproex  250TID   c/w Serequal 50mg Q HS   c/w remeron 7.5 q D      Dementia   -below meds will resume when GI stable and clear by GI to take PO  c/w Namenda 5mg q HS        CV  Hx  CAD s/p CABG 219 , NSTEMI, HTN, Acute heart failure  - metoprolol 5 mg IVp q 6 w/ parameters   -holding lasix ( last dose 2/13) in settinf of GIB   -resume Lipitor when stable to take PO      PULM  Acute resp failure in setting of massive GIB   -s/p intubation for airway protection , for endoscopy   -will remain intubated post procedure for close monitoring in MICU   -aspiration precaution     GI  Acute blood loss anemia 2/2 UGIB 2/2 duodenal ulcer w/ active bleeding vessel   hgb drop from 7.1 to 5.1  s/p 2 PRBC with hgb repeat 7.9 , than repeat hgb 5.5 s/p 2 PRBC 7.0; total 4 PRBC in last 12 hrs   -s/p Endoscopy w. findings of duadenal ulcer s.p tx w/ epi.  -hemodynamics stable for now however if re-bleeds will require IR intervention . Otherwise GI follow up in Am   -continue PPI infusion  -continue to hold TF  -GI input appreciated     Hx of b/l RP bleed during this admission   -s/p Abdominal Angiography and Embolization on 10/29/2021 by Interventional radiology of l4and l5 lumbar artery     3 C-diff  -+ 2/13   -on vanco 125mg q 6 hrs  via PEG          ABBY on CKD   -still makes urine   -on HD via permacath ( placed 2/10 /22 R IJ )   - c/w midodrine 10 mg q prior to HD t/th/sat  -c/w nephro nina q d   -AVF not functional   c/w johnson cath ; will re-eval in am if able to d/c     Hypercalcemia.   Will resume  Sensipar 60mg daily via peg    BPH   c/w johnson cath   -c/w flomax       ENDO  DMT2  -NPO on d5 @ 40   -FS q 6 hrs with ISS q 6 hrs   ENDO: Suggest DC on Tradjenta 5mg daily, discontinue prior hypoglycemic agents/insulin, endo FU 4 weeks.      Hypothyroidism.    continue synthroid 20 mcg IV daily for now.  Will continue monitoring TFTs and FU.  Suggest to FU outpatient, repeat TFTs in 4-6 weeks.    HEME   Acute blood loss 2/2 UGIB 2/2 duodenal ulcer   -s/p 4 PRBC in last 12hrs   -hx of b/l RP bleed while on heparin gtt s/o embolization   -hold AC  -c/ w folic acid   -CBC q 6 hrs   -goal hgb > 7  -goal INR <1.5  -goal platelets  > 100    ID  Sepsis 2/2 c diff  -will resume vancomycin 125 mg q 6 hrs via PEG when GI stable   -trend WBC  -trend T curve     PPX  GI ppx ; PPI gtt  DVT ppx ; compression devices      ----------------------Attending attestation----------------  72yo M w/ PMHx of HTN,  CAD (s/p CABG 2019), CKD (unknown stage), DM2, Parkinson's Disease, depression, dementia  who p/ on 10/21/21 p/w LE swelling, was found to be in new onset acute HF exacerbation, NSTEMI, was started on hep gtt, c/b b/l  RP bleed, acute anemia. sp MICU course for hemorrhagic shock 2/2 RP bleed , on 10/28 sp IR embolization l4 and l5 lumbar artery, hospital course c/b COVID; newly ESRD-HD as of this admission, sp PermCath and AVF now , s/p acute blood loss anemia 2/2 UGIB 2/2 duodenal ulcer w/ active bleeding vessel s/p endoscopy with epi now remains intubated post procedure and admitted to MICU for monitoring and management .    Overall patient has been slowly dying from multiple complication from his chronic multiogan failure(CHF, Dementria, ESRD).  His over all prognosis is terrible and he has been hospitalized for over 3 months.  Per report GOC conversations have been fruitless.    He is now in MICU post High risk GIB from GDA territory bleeding duodenal ulcer.  With plan to observe overnight and extubate if stable.   This is his second attempts to exsanguinate this hospitalization would say he is not a candidate for a/c.   Cdiff seems to be under control current frequent Bms like secondary to hemorrhage rather than C-Diff continue currentr vanc dose.    DVT PPX with SCDs.  patient is not in shock or respiratory failure but requires frequent monitoring for potential massive hemorrhage.   Critical care time provided 35 min Patient is a 71y old  Male who presents with a chief complaint of leg swelling .      72yo M w/ PMHx of HTN,  CAD (s/p CABG 2019), CKD (unknown stage), DM2, Parkinson's Disease, depression, dementia  who p/ on 10/21/21 p/w LE swelling, was found to be in new onset acute HF exacerbation, NSTEMI, was started on hep gtt, c/b b/l  RP bleed, acute anemia. sp MICU course for hemorrhagic shock 2/2 RP bleed , on 10/28 sp IR embolization l4 and l5 lumbar artery, hospital course c/b COVID; newly ESRD-HD as of this admission, sp PermCath and AVF now , s/p acute blood loss anemia 2/2 UGIB 2/2 duodenal ulcer w/ active bleeding vessel s/p endoscopy with epi now remains intubated post procedure and admitted to MICU for monitoring and management .      ROS:  Unable to ROS pt is intubated and sedated.   Allergies    adhesives (Rash)  latex (Urticaria)  No Known Drug Allergies    Intolerances        ANTIBIOTICS/RELEVANT:  antimicrobials  vancomycin    Solution 125 milliGRAM(s) Oral every 6 hours    immunologic:    OTHER:  acetaminophen     Tablet .. 650 milliGRAM(s) Oral every 6 hours PRN  albuterol/ipratropium for Nebulization 3 milliLiter(s) Nebulizer every 6 hours PRN  atorvastatin 80 milliGRAM(s) Oral at bedtime  carbidopa/levodopa  25/100 2.5 Tablet(s) Oral <User Schedule>  carbidopa/levodopa  25/100 2 Tablet(s) Oral <User Schedule>  chlorhexidine 4% Liquid 1 Application(s) Topical <User Schedule>  cinacalcet 60 milliGRAM(s) Oral daily  dextrose 40% Gel 15 Gram(s) Oral once  dextrose 5%. 1000 milliLiter(s) IV Continuous <Continuous>  dextrose 5%. 1000 milliLiter(s) IV Continuous <Continuous>  dextrose 5%. 1000 milliLiter(s) IV Continuous <Continuous>  dextrose 50% Injectable 25 Gram(s) IV Push once  dextrose 50% Injectable 25 Gram(s) IV Push once  dextrose 50% Injectable 12.5 Gram(s) IV Push once  dextrose 50% Injectable 25 Gram(s) IV Push once  diVALproex Sprinkle 125 milliGRAM(s) Oral every 8 hours PRN  diVALproex Sprinkle 250 milliGRAM(s) Oral three times a day  DULoxetine 20 milliGRAM(s) Oral daily  folic acid 1 milliGRAM(s) Oral daily  glucagon  Injectable 1 milliGRAM(s) IntraMuscular once  guaiFENesin Oral Liquid (Sugar-Free) 200 milliGRAM(s) Oral every 6 hours PRN  insulin lispro (ADMELOG) corrective regimen sliding scale   SubCutaneous every 6 hours  levothyroxine Injectable 20 MICROGram(s) IV Push at bedtime  memantine 5 milliGRAM(s) Oral at bedtime  metoprolol tartrate Injectable 5 milliGRAM(s) IV Push every 6 hours  midodrine 10 milliGRAM(s) Oral <User Schedule>  mirtazapine 7.5 milliGRAM(s) Oral daily  Nephro-celeste 1 Tablet(s) Oral daily  pantoprazole Infusion 8 mG/Hr IV Continuous <Continuous>  QUEtiapine 50 milliGRAM(s) Oral at bedtime  QUEtiapine 12.5 milliGRAM(s) Oral every 6 hours PRN  sodium chloride 0.9% lock flush 10 milliLiter(s) IV Push every 1 hour PRN  tamsulosin 0.4 milliGRAM(s) Oral at bedtime  trihexyphenidyl 2 milliGRAM(s) Oral three times a day      Objective:  Vital Signs Last 24 Hrs  T(C): 36.4 (16 Feb 2022 17:35), Max: 37.6 (15 Feb 2022 20:36)  T(F): 97.6 (16 Feb 2022 17:17), Max: 99.6 (15 Feb 2022 20:36)  HR: 110 (16 Feb 2022 17:35) (80 - 112)  BP: 104/71 (16 Feb 2022 17:35) (89/67 - 108/70)  RR: 18 (16 Feb 2022 17:35) (18 - 18)  SpO2: 99% (16 Feb 2022 11:08) (95% - 99%)    PHYSICAL EXAM:  General -   HEENT -   CV -   Resp -   Abdomen -   Extremities -   Skin -       LABS:                        7.0    13.23 )-----------( 177      ( 16 Feb 2022 15:23 )             20.9     02-16    134<L>  |  98  |  68<H>  ----------------------------<  139<H>  4.2   |  25  |  2.50<H>    Ca    7.7<L>      16 Feb 2022 07:06    TPro  5.3<L>  /  Alb  1.8<L>  /  TBili  0.3  /  DBili  x   /  AST  21  /  ALT  <5<L>  /  AlkPhos  60  02-16            MICROBIOLOGY:  Culture - Urine (01.25.22 @ 00:53)    Culture Results:   >100,000 CFU/ml Escherichia coli    Organism Identification: Escherichia coli    Culture - Blood (10.29.21 @ 14:55)    Specimen Source: .Blood Blood    Culture Results:   No Growth Final        RADIOLOGY & ADDITIONAL STUDIES:      PROCEDURE DATE:  01/24/2022      INTERPRETATION:  Noncontrast CT of the brain.    CLINICAL INDICATION:  Altered mental status    COMPARISON: CT brain 10/27/2021    FINDINGS:    Interval increase in size of right anterolateral parietal extra-axial   hyperdense mass with peripheral calcifications, currently 4.2 x 2.1 cm,   previously 3.4 x 1.9 cm. This likely represents a meningioma.    No underlying vasogenic edema.    No midline shift or effacement of basal cisterns. No hydrocephalus. No   brain edema or acute intracranial hemorrhage.    Chronic left inferior cerebellar hemisphere infarct. Mild white matter   microvascular ischemic disease.    Bilateral maxillary and right sphenoid sinus mucosal thickening. Mastoid   air cells clear.    IMPRESSION:    No acute intracranial hemorrhage or brain edema.    No midline shift or effacement of basal cisterns. No hydrocephalus.    Interval increase in size of right anterolateral parietal meningioma,   currently 4.2 x 2.1 cm, previously 3.4 x 1.9 cm.        PROCEDURE DATE:  11/23/2021      CLINICAL INFORMATION: Permacath position.    COMPARISON:  November 2, 2021.    TECHNIQUE:   AP Portable chest x-ray. Apices excluded from image.    INTERPRETATION:    Heart size and the mediastinum cannot be accurately evaluated on this projection. Median sternotomy sutures and surgical clips again seen.  Previous enteric tube not seen.  Right IJ approach permacath with tip in the right atrium.  The included right lung is clear.  Left basilar/retrocardiac opacity with obscuration of the left hemidiaphragm is not significantly changed.  No right pleural effusion seen.  No pneumothorax noted however the apices are excluded and not evaluated.  There is osteoarthritic degenerative change of the spine.      IMPRESSION:  Right IJ approach permacath with tip in the right atrium.    Left basilar and retrocardiac which may be due to a left pleural effusion with passive atelectasis, atelectasis of other cause, and/or pneumonia, not significantly changed.  --------------------------------------------------------------------------------------------------------------------------------------------------------------------------------------------------------------------------------------------------------------------------------------------------------------------------------------------------------------------------------------------------------------------------------------------------------------------    ASSESSMENT AND PLAN        Neuro   Metabolic encephalopathy in setting of hemorrhagic shock   -s/p intubation for endoscopy 2/16 ; remains intubated for re-scope 2/17   -admit to MICU   -c/w propofol gtt ; wean as tolerate   -neuro checks     Parkinson's Disease   -below meds will resume when GI stable and clear by GI to take PO  c/w Sinemet 25/100 2.5 TAB 6 am and 2pm  c/w Sinemet 25/100 2 TAB 10pm   C/w Artane 2mg TID     Depression   -below meds will resume when GI stable and clear by GI to take PO  c/w Cymbalta 20 q D    c/w Divalproex  250TID   c/w Serequal 50mg Q HS   c/w remeron 7.5 q D      Dementia   -below meds will resume when GI stable and clear by GI to take PO  c/w Namenda 5mg q HS        CV  Hx  CAD s/p CABG 219 , NSTEMI, HTN, Acute heart failure  - metoprolol 5 mg IVp q 6 w/ parameters   -holding lasix ( last dose 2/13) in settinf of GIB   -resume Lipitor when stable to take PO      PULM  Acute resp failure in setting of massive GIB   -s/p intubation for airway protection , for endoscopy   -will remain intubated post procedure for close monitoring in MICU   -aspiration precaution     GI  Acute blood loss anemia 2/2 UGIB 2/2 duodenal ulcer w/ active bleeding vessel   hgb drop from 7.1 to 5.1  s/p 2 PRBC with hgb repeat 7.9 , than repeat hgb 5.5 s/p 2 PRBC 7.0; total 4 PRBC in last 12 hrs   -s/p Endoscopy w. findings of duadenal ulcer s.p tx w/ epi.  -hemodynamics stable for now however if re-bleeds will require IR intervention . Otherwise GI follow up in Am   -continue PPI infusion  -continue to hold TF  -GI input appreciated     Hx of b/l RP bleed during this admission   -s/p Abdominal Angiography and Embolization on 10/29/2021 by Interventional radiology of l4and l5 lumbar artery      C-diff  -+ 2/13   -on vanco 125mg q 6 hrs  via PEG          ABBY on CKD   -still makes urine   -on HD via permacath ( placed 2/10 /22 R IJ )   - c/w midodrine 10 mg q prior to HD t/th/sat  -c/w nephro nina q d   -AVF not functional   c/w johnson cath ; will re-eval in am if able to d/c     Hypercalcemia.   Will resume  Sensipar 60mg daily via peg    BPH   c/w johnson cath   -c/w flomax       ENDO  DMT2  -NPO on d5 @ 40   -FS q 6 hrs with ISS q 6 hrs   ENDO: Suggest DC on Tradjenta 5mg daily, discontinue prior hypoglycemic agents/insulin, endo FU 4 weeks.      Hypothyroidism.    continue synthroid 20 mcg IV daily for now.  Will continue monitoring TFTs and FU.  Suggest to FU outpatient, repeat TFTs in 4-6 weeks.    HEME   Acute blood loss 2/2 UGIB 2/2 duodenal ulcer   -s/p 4 PRBC in last 12hrs   -hx of b/l RP bleed while on heparin gtt s/o embolization   -hold AC  -c/ w folic acid   -CBC q 6 hrs   -goal hgb > 7  -goal INR <1.5  -goal platelets  > 100    ID  Sepsis 2/2 c diff  -will resume vancomycin 125 mg q 6 hrs via PEG when GI stable   -trend WBC  -trend T curve         PPX  GI ppx ; PPI gtt  DVT ppx ; compression devices      ----------------------Attending attestation----------------  72yo M w/ PMHx of HTN,  CAD (s/p CABG 2019), CKD (unknown stage), DM2, Parkinson's Disease, depression, dementia  who p/ on 10/21/21 p/w LE swelling, was found to be in new onset acute HF exacerbation, NSTEMI, was started on hep gtt, c/b b/l  RP bleed, acute anemia. sp MICU course for hemorrhagic shock 2/2 RP bleed , on 10/28 sp IR embolization l4 and l5 lumbar artery, hospital course c/b COVID; newly ESRD-HD as of this admission, sp PermCath and AVF now , s/p acute blood loss anemia 2/2 UGIB 2/2 duodenal ulcer w/ active bleeding vessel s/p endoscopy with epi now remains intubated post procedure and admitted to MICU for monitoring and management .    Overall patient has been slowly dying from multiple complication from his chronic multiogan failure(CHF, Dementria, ESRD).  His over all prognosis is terrible and he has been hospitalized for over 3 months.  Per report GOC conversations have been fruitless.    He is now in MICU post High risk GIB from GDA territory bleeding duodenal ulcer.  With plan to observe overnight and extubate if stable.   This is his second attempts to exsanguinate this hospitalization would say he is not a candidate for a/c.   Cdiff seems to be under control current frequent Bms like secondary to hemorrhage rather than C-Diff continue currentr vanc dose.    DVT PPX with SCDs.  patient is not in shock or respiratory failure but requires frequent monitoring for potential massive hemorrhage.   Critical care time provided 35 min breath sounds bilateral/positive end tidal CO2 noted

## 2022-02-17 NOTE — PROGRESS NOTE ADULT - ASSESSMENT
71 M w volume overload, GI consulted for drop in hgb    1. GI Bleeding  -s/p endoscopy yesterday which showed spurting duodenal ulcer; ulcer injected with epi for hemostasis, cautery and hemospray used.  -hgb 9.9 today  -continue PPI infusion  -NPO  -if bleeding occurs again would need embolization by IR  -monitor for GI bleeding  -trend CBCs    2. ABBY on CKD per renal  -on HD via permacath per renal, midodrine during dialysis  -AVF not functional   -s/p tunnelled HD catheter insertion by IR 2/10    3. RP bleed  -s/p Abdominal Angiography and Embolization on 10/29/2021 by Interventional radiology of l4and l5 lumbar artery     3. C-diff  -on vanco     Attending supervision statement: I have personally seen and examined the patient. I fully participated in the care of this patient. I have made amendments to the documentation where necessary, and agree with the history, physical exam, and plan as outlined by the ACP.      [facility]  Gastroenterology and Hepatology  [address] [place], [place]  Office: [phone]  Cell: [phone]

## 2022-02-17 NOTE — PROGRESS NOTE ADULT - SUBJECTIVE AND OBJECTIVE BOX
[facility] [phone]        Patient seen and examined in bed in MICU.  He was intubated p the EGD and they did not extubate         MEDICATIONS  (STANDING):  atorvastatin 80 milliGRAM(s) Oral at bedtime  carbidopa/levodopa  25/100 2.5 Tablet(s) Oral <User Schedule>  carbidopa/levodopa  25/100 2 Tablet(s) Oral <User Schedule>  chlorhexidine 0.12% Liquid 15 milliLiter(s) Oral Mucosa every 12 hours  chlorhexidine 4% Liquid 1 Application(s) Topical <User Schedule>  chlorhexidine 4% Liquid 1 Application(s) Topical <User Schedule>  dextrose 40% Gel 15 Gram(s) Oral once  dextrose 5%. 1000 milliLiter(s) (50 mL/Hr) IV Continuous <Continuous>  dextrose 5%. 1000 milliLiter(s) (100 mL/Hr) IV Continuous <Continuous>  dextrose 5%. 1000 milliLiter(s) (20 mL/Hr) IV Continuous <Continuous>  dextrose 50% Injectable 25 Gram(s) IV Push once  dextrose 50% Injectable 25 Gram(s) IV Push once  dextrose 50% Injectable 12.5 Gram(s) IV Push once  dextrose 50% Injectable 25 Gram(s) IV Push once  diVALproex Sprinkle 250 milliGRAM(s) Oral three times a day  doxazosin 1 milliGRAM(s) Oral at bedtime  DULoxetine 20 milliGRAM(s) Oral daily  folic acid 1 milliGRAM(s) Oral daily  glucagon  Injectable 1 milliGRAM(s) IntraMuscular once  insulin lispro (ADMELOG) corrective regimen sliding scale   SubCutaneous every 6 hours  levothyroxine Injectable 30 MICROGram(s) IV Push at bedtime  memantine 5 milliGRAM(s) Oral at bedtime  metoprolol tartrate Injectable 5 milliGRAM(s) IV Push every 6 hours  midodrine 10 milliGRAM(s) Oral <User Schedule>  mirtazapine 7.5 milliGRAM(s) Oral daily  Nephro-celeste 1 Tablet(s) Oral daily  norepinephrine Infusion 0.08 MICROgram(s)/kG/Min (14.5 mL/Hr) IV Continuous <Continuous>  pantoprazole Infusion 8 mG/Hr (10 mL/Hr) IV Continuous <Continuous>  propofol Infusion 20 MICROgram(s)/kG/Min (11.6 mL/Hr) IV Continuous <Continuous>  QUEtiapine 50 milliGRAM(s) Oral at bedtime  trihexyphenidyl 2 milliGRAM(s) Oral three times a day  vancomycin    Solution 125 milliGRAM(s) Oral every 6 hours      VITAL:  T(C): , Max: 36.8 (02-17-22 @ 08:00)  T(F): , Max: 98.2 (02-17-22 @ 08:00)  HR: 115 (02-17-22 @ 13:30)  BP: 119/62 (02-16-22 @ 19:45)  BP(mean): 84 (02-16-22 @ 19:45)  RR: 15 (02-17-22 @ 13:30)  SpO2: 100% (02-17-22 @ 13:30)  Wt(kg): --    I and O's:    02-16 @ 07:01  -  02-17 @ 07:00  --------------------------------------------------------  IN: 1099.1 mL / OUT: 250 mL / NET: 849.1 mL    02-17 @ 07:01  -  02-17 @ 13:35  --------------------------------------------------------  IN: 360.6 mL / OUT: 0 mL / NET: 360.6 mL      Height (cm): 180.3 (02-16 @ 17:35)  Weight (kg): 96.4 (02-16 @ 17:35)  BMI (kg/m2): 29.7 (02-16 @ 17:35)  BSA (m2): 2.16 (02-16 @ 17:35)    PHYSICAL EXAM:    Constitutional: intubated   Neck:  No JVD  Respiratory: Course   Cardiovascular: S1 and S2  Gastrointestinal: BS+, soft, NT/ND  Extremities: No peripheral edema  Neurological:    : No Javier  Skin: No rashes  Access: perm cath and avf     LABS:                        9.9    13.96 )-----------( 174      ( 17 Feb 2022 09:57 )             28.6     02-17    130<L>  |  96  |  75<H>  ----------------------------<  154<H>  3.7   |  19<L>  |  2.78<H>    Ca    8.2<L>      17 Feb 2022 00:23  Phos  3.7     02-17  Mg     1.7     02-17    TPro  5.6<L>  /  Alb  1.8<L>  /  TBili  0.6  /  DBili  x   /  AST  23  /  ALT  <5<L>  /  AlkPhos  65  02-17          Urine Studies:          RADIOLOGY & ADDITIONAL STUDIES:

## 2022-02-17 NOTE — PROGRESS NOTE ADULT - ASSESSMENT
Neuro   Metabolic encephalopathy in setting of hemorrhagic shock   -s/p intubation for endoscopy 2/16 ; remains intubated for re-scope 2/17   -admit to MICU   -c/w propofol gtt ; wean as tolerate   -neuro checks     Parkinson's Disease   -below meds will resume when GI stable and clear by GI to take PO  c/w Sinemet 25/100 2.5 TAB 6 am and 2pm  c/w Sinemet 25/100 2 TAB 10pm   C/w Artane 2mg TID     Depression   -below meds will resume when GI stable and clear by GI to take PO  c/w Cymbalta 20 q D    c/w Divalproex  250TID   c/w Serequal 50mg Q HS   c/w remeron 7.5 q D      Dementia   -below meds will resume when GI stable and clear by GI to take PO  c/w Namenda 5mg q HS        CV  Hx  CAD s/p CABG 219 , NSTEMI, HTN, Acute heart failure  - metoprolol 5 mg IVp q 6 w/ parameters   -holding lasix ( last dose 2/13) in settinf of GIB   -resume Lipitor when stable to take PO      PULM  Acute resp failure in setting of massive GIB   -s/p intubation for airway protection , for endoscopy   -will remain intubated post procedure for close monitoring in MICU   -aspiration precaution     GI  Acute blood loss anemia 2/2 UGIB 2/2 duodenal ulcer w/ active bleeding vessel   hgb drop from 7.1 to 5.1  s/p 2 PRBC with hgb repeat 7.9 , than repeat hgb 5.5 s/p 2 PRBC 7.0; total 4 PRBC in last 12 hrs   -s/p Endoscopy w. findings of duadenal ulcer s.p tx w/ epi.  -hemodynamics stable for now however if re-bleeds will require IR intervention . Otherwise GI follow up in Am   -continue PPI infusion  -continue to hold TF  -GI input appreciated     Hx of b/l RP bleed during this admission   -s/p Abdominal Angiography and Embolization on 10/29/2021 by Interventional radiology of l4and l5 lumbar artery      C-diff  -+ 2/13   -on vanco 125mg q 6 hrs  via PEG          ABBY on CKD   -still makes urine   -on HD via permacath ( placed 2/10 /22 R IJ )   - c/w midodrine 10 mg q prior to HD t/th/sat  -c/w nephro nina q d   -AVF not functional   c/w johnson cath ; will re-eval in am if able to d/c     Hypercalcemia.   Will resume  Sensipar 60mg daily via peg    BPH   c/w johnson cath   -c/w flomax       ENDO  DMT2  -NPO on d5 @ 40   -FS q 6 hrs with ISS q 6 hrs   ENDO: Suggest DC on Tradjenta 5mg daily, discontinue prior hypoglycemic agents/insulin, endo FU 4 weeks.      Hypothyroidism.    continue synthroid 20 mcg IV daily for now.  Will continue monitoring TFTs and FU.  Suggest to FU outpatient, repeat TFTs in 4-6 weeks.    HEME   Acute blood loss 2/2 UGIB 2/2 duodenal ulcer   -s/p 4 PRBC in last 12hrs   -hx of b/l RP bleed while on heparin gtt s/o embolization   -hold AC  -c/ w folic acid   -CBC q 6 hrs   -goal hgb > 7  -goal INR <1.5  -goal platelets  > 100    ID  Sepsis 2/2 c diff  -will resume vancomycin 125 mg q 6 hrs via PEG when GI stable   -trend WBC  -trend T curve         PPX  GI ppx ; PPI gtt  DVT ppx ; compression devices Neuro   #Dementia    -admit to MICU   -c/w propofol gtt ; wean as tolerate   -neuro checks     Parkinson's Disease   -below meds will resume when GI stable and clear by GI to take PO  c/w Sinemet 25/100 2.5 TAB 6 am and 2pm  c/w Sinemet 25/100 2 TAB 10pm   C/w Artane 2mg TID     Depression   -below meds will resume when GI stable and clear by GI to take PO  c/w Cymbalta 20 q D    c/w Divalproex  250TID   c/w Serequal 50mg Q HS   c/w remeron 7.5 q D      Dementia   -below meds will resume when GI stable and clear by GI to take PO  c/w Namenda 5mg q HS        CV  Hx  CAD s/p CABG 219 , NSTEMI, HTN, Acute heart failure  - metoprolol 5 mg IVp q 6 w/ parameters   -holding lasix ( last dose 2/13) in settinf of GIB   -resume Lipitor when stable to take PO      PULM  Acute resp failure in setting of massive GIB   -s/p intubation for airway protection , for endoscopy   -will remain intubated post procedure for close monitoring in MICU   -aspiration precaution     GI  Acute blood loss anemia 2/2 UGIB 2/2 duodenal ulcer w/ active bleeding vessel   hgb drop from 7.1 to 5.1  s/p 2 PRBC with hgb repeat 7.9 , than repeat hgb 5.5 s/p 2 PRBC 7.0; total 4 PRBC in last 12 hrs   -s/p Endoscopy w. findings of duadenal ulcer s.p tx w/ epi.  -hemodynamics stable for now however if re-bleeds will require IR intervention . Otherwise GI follow up in Am   -continue PPI infusion  -continue to hold TF  -GI input appreciated     Hx of b/l RP bleed during this admission   -s/p Abdominal Angiography and Embolization on 10/29/2021 by Interventional radiology of l4and l5 lumbar artery      C-diff  -+ 2/13   -on vanco 125mg q 6 hrs  via PEG          ABBY on CKD   -still makes urine   -on HD via permacath ( placed 2/10 /22 R IJ )   - c/w midodrine 10 mg q prior to HD t/th/sat  -c/w nephro nina q d   -AVF not functional   c/w johnson cath ; will re-eval in am if able to d/c     Hypercalcemia.   Will resume  Sensipar 60mg daily via peg    BPH   c/w johnson cath   -c/w flomax       ENDO  DMT2  -NPO on d5 @ 40   -FS q 6 hrs with ISS q 6 hrs   ENDO: Suggest DC on Tradjenta 5mg daily, discontinue prior hypoglycemic agents/insulin, endo FU 4 weeks.      Hypothyroidism.    continue synthroid 20 mcg IV daily for now.  Will continue monitoring TFTs and FU.  Suggest to FU outpatient, repeat TFTs in 4-6 weeks.    HEME   Acute blood loss 2/2 UGIB 2/2 duodenal ulcer   -s/p 4 PRBC in last 12hrs   -hx of b/l RP bleed while on heparin gtt s/o embolization   -hold AC  -c/ w folic acid   -CBC q 6 hrs   -goal hgb > 7  -goal INR <1.5  -goal platelets  > 100    ID  Sepsis 2/2 c diff  -will resume vancomycin 125 mg q 6 hrs via PEG when GI stable   -trend WBC  -trend T curve         PPX  GI ppx ; PPI gtt  DVT ppx ; compression devices Neuro     - c/w propofol while intubated    #Parkinson disease   - c/w Sinemet 25/100 2.5 TAB 6 am and 2pm  - c/w Sinemet 25/100 2 TAB 10pm   - c/w Artane 2mg TID     #Depression   - c/w Cymbalta 20mg qd    - c/w Divalproex 250mg tid  - c/w Seroquel 50mg qhs  - c/w Remeron 7.5mg qd    Dementia   -below meds will resume when GI stable and clear by GI to take PO  c/w Namenda 5mg q HS        CV  Hx  CAD s/p CABG 219 , NSTEMI, HTN, Acute heart failure  - metoprolol 5 mg IVp q 6 w/ parameters   -holding lasix ( last dose 2/13) in settinf of GIB   -resume Lipitor when stable to take PO      PULM  Acute resp failure in setting of massive GIB   -s/p intubation for airway protection , for endoscopy   -will remain intubated post procedure for close monitoring in MICU   -aspiration precaution     GI  Acute blood loss anemia 2/2 UGIB 2/2 duodenal ulcer w/ active bleeding vessel   hgb drop from 7.1 to 5.1  s/p 2 PRBC with hgb repeat 7.9 , than repeat hgb 5.5 s/p 2 PRBC 7.0; total 4 PRBC in last 12 hrs   -s/p Endoscopy w. findings of duadenal ulcer s.p tx w/ epi.  -hemodynamics stable for now however if re-bleeds will require IR intervention . Otherwise GI follow up in Am   -continue PPI infusion  -continue to hold TF  -GI input appreciated     Hx of b/l RP bleed during this admission   -s/p Abdominal Angiography and Embolization on 10/29/2021 by Interventional radiology of l4and l5 lumbar artery      C-diff  -+ 2/13   -on vanco 125mg q 6 hrs  via PEG          ABBY on CKD   -still makes urine   -on HD via permacath ( placed 2/10 /22 R IJ )   - c/w midodrine 10 mg q prior to HD t/th/sat  -c/w nephro nina q d   -AVF not functional   c/w johnson cath ; will re-eval in am if able to d/c     Hypercalcemia.   Will resume  Sensipar 60mg daily via peg    BPH   c/w johnson cath   -c/w flomax       ENDO  DMT2  -NPO on d5 @ 40   -FS q 6 hrs with ISS q 6 hrs   ENDO: Suggest DC on Tradjenta 5mg daily, discontinue prior hypoglycemic agents/insulin, endo FU 4 weeks.      Hypothyroidism.    continue synthroid 20 mcg IV daily for now.  Will continue monitoring TFTs and FU.  Suggest to FU outpatient, repeat TFTs in 4-6 weeks.    HEME   Acute blood loss 2/2 UGIB 2/2 duodenal ulcer   -s/p 4 PRBC in last 12hrs   -hx of b/l RP bleed while on heparin gtt s/o embolization   -hold AC  -c/ w folic acid   -CBC q 6 hrs   -goal hgb > 7  -goal INR <1.5  -goal platelets  > 100    ID  Sepsis 2/2 c diff  -will resume vancomycin 125 mg q 6 hrs via PEG when GI stable   -trend WBC  -trend T curve         PPX  GI ppx ; PPI gtt  DVT ppx ; compression devices Neuro   - c/w propofol while intubated  - c/w neuro checks q4h, though unclear what the purpose is (no history of stroke or seizure etc)    #Parkinson disease   - c/w Sinemet 25/100 2.5 TAB 6 am and 2pm  - c/w Sinemet 25/100 2 TAB 10pm   - c/w Artane 2mg TID     #Depression   - c/w Cymbalta 20mg qd    - c/w Divalproex 250mg tid  - c/w Seroquel 50mg qhs  - c/w Remeron 7.5mg qd    #Dementia   - c/w Namenda 5mg qhs      CV  #CAD s/p CABG 2019  #HTN  #Heart failure  - c/w metoprolol 5mg IV q6h  - cont to hold Lasix iso GIB  - c/w atorvastatin 80mg qhs    PULM  No active pulmonary disease  Intubated prior to endoscopy, remained intubated post procedure for close monitoring in MICU   - extubate today in controlled MICU environment    GI  Acute blood loss anemia 2/2 UGIB 2/2 duodenal ulcer w/ active bleeding vessel   hgb drop from 7.1 to 5.1  s/p 2 PRBC with hgb repeat 7.9 , than repeat hgb 5.5 s/p 2 PRBC 7.0; total 4 PRBC in last 12 hrs   -s/p Endoscopy w. findings of duadenal ulcer s.p tx w/ epi.  -hemodynamics stable for now however if re-bleeds will require IR intervention . Otherwise GI follow up in Am   -continue PPI infusion  -continue to hold TF  -GI input appreciated     Hx of b/l RP bleed during this admission   -s/p Abdominal Angiography and Embolization on 10/29/2021 by Interventional radiology of l4and l5 lumbar artery      C-diff  -+ 2/13   -on vanco 125mg q 6 hrs  via PEG          ABBY on CKD   -still makes urine   -on HD via permacath ( placed 2/10 /22 R IJ )   - c/w midodrine 10 mg q prior to HD t/th/sat  -c/w nephro nina q d   -AVF not functional   c/w johnson cath ; will re-eval in am if able to d/c     Hypercalcemia.   Will resume  Sensipar 60mg daily via peg    BPH   c/w johnson cath   -c/w flomax       ENDO  DMT2  -NPO on d5 @ 40   -FS q 6 hrs with ISS q 6 hrs   ENDO: Suggest DC on Tradjenta 5mg daily, discontinue prior hypoglycemic agents/insulin, endo FU 4 weeks.      Hypothyroidism.    continue synthroid 20 mcg IV daily for now.  Will continue monitoring TFTs and FU.  Suggest to FU outpatient, repeat TFTs in 4-6 weeks.    HEME   Acute blood loss 2/2 UGIB 2/2 duodenal ulcer   -s/p 4 PRBC in last 12hrs   -hx of b/l RP bleed while on heparin gtt s/o embolization   -hold AC  -c/ w folic acid   -CBC q 6 hrs   -goal hgb > 7  -goal INR <1.5  -goal platelets  > 100    ID  Sepsis 2/2 c diff  -will resume vancomycin 125 mg q 6 hrs via PEG when GI stable   -trend WBC  -trend T curve         PPX  GI ppx ; PPI gtt  DVT ppx ; compression devices Neuro   - c/w propofol while intubated  - c/w neuro checks q4h, though unclear what the purpose is (no history of stroke or seizure etc)    #Parkinson disease   - c/w Sinemet 25/100 2.5 TAB 6 am and 2pm  - c/w Sinemet 25/100 2 TAB 10pm   - c/w Artane 2mg TID     #Depression   - c/w Cymbalta 20mg qd    - c/w Divalproex 250mg tid  - c/w Seroquel 50mg qhs  - c/w Remeron 7.5mg qd    #Dementia   - c/w Namenda 5mg qhs      CV  #CAD s/p CABG 2019  #HTN  #Heart failure  - c/w metoprolol 5mg IV q6h  - cont to hold Lasix iso GIB  - c/w atorvastatin 80mg qhs    PULM  No active pulmonary disease  Intubated prior to endoscopy, remained intubated post procedure for close monitoring in MICU   - extubate today in controlled MICU environment    GI  Acute blood loss anemia 2/2 UGIB 2/2 duodenal ulcer w/ active bleeding vessel   hgb drop from 7.1 to 5.1  s/p 2 PRBC with hgb repeat 7.9 , than repeat hgb 5.5 s/p 2 PRBC 7.0; total 4 PRBC in last 12 hrs   -s/p Endoscopy w. findings of duadenal ulcer s.p tx w/ epi.  -hemodynamics stable for now however if re-bleeds will require IR intervention  -continue PPI infusion  -restart TF  -GI input appreciated     Hx of b/l RP bleed during this admission   -s/p Abdominal Angiography and Embolization on 10/29/2021 by Interventional radiology of l4and l5 lumbar artery      C-diff  -+ 2/13   -on vanco 125mg q 6 hrs  via PEG          ABBY on CKD   -still makes urine   -on HD via permacath ( placed 2/10 /22 R IJ )   - c/w midodrine 10 mg q prior to HD t/th/sat  -c/w nephro nina q d   -AVF not functional   c/w johnson cath ; will re-eval in am if able to d/c     Hypercalcemia.   Will resume  Sensipar 60mg daily via peg    BPH   c/w johnson cath   -c/w flomax       ENDO  DMT2  -NPO on d5 @ 40   -FS q 6 hrs with ISS q 6 hrs   ENDO: Suggest DC on Tradjenta 5mg daily, discontinue prior hypoglycemic agents/insulin, endo FU 4 weeks.      Hypothyroidism.    continue synthroid 20 mcg IV daily for now.  Will continue monitoring TFTs and FU.  Suggest to FU outpatient, repeat TFTs in 4-6 weeks.    HEME   Acute blood loss 2/2 UGIB 2/2 duodenal ulcer   -s/p 4 PRBC in last 12hrs   -hx of b/l RP bleed while on heparin gtt s/o embolization   -hold AC  -c/ w folic acid   -CBC q 6 hrs   -goal hgb > 7  -goal INR <1.5  -goal platelets  > 100    ID  Sepsis 2/2 c diff  -will resume vancomycin 125 mg q 6 hrs via PEG when GI stable   -trend WBC  -trend T curve         PPX  GI ppx ; PPI gtt  DVT ppx ; compression devices Neuro   - c/w propofol while intubated  - c/w neuro checks q4h, though unclear what the purpose is (no history of stroke or seizure etc)    #Parkinson disease   - c/w Sinemet 25/100 2.5 TAB 6 am and 2pm  - c/w Sinemet 25/100 2 TAB 10pm   - c/w Artane 2mg TID     #Depression   - c/w Cymbalta 20mg qd    - c/w Divalproex 250mg tid  - c/w Seroquel 50mg qhs  - c/w Remeron 7.5mg qd    #Dementia   - c/w Namenda 5mg qhs      CV  #CAD s/p CABG 2019  #HTN  #Heart failure  - c/w metoprolol 5mg IV q6h  - cont to hold Lasix iso GIB  - c/w atorvastatin 80mg qhs    PULM  No active pulmonary disease  Intubated prior to endoscopy, remained intubated post procedure for close monitoring in MICU   - extubate today in controlled MICU environment    GI  #Acute blood loss anemia 2/2 UGIB 2/2 duodenal ulcer w/ active bleeding vessel   hgb drop from 7.1 to 5.1  s/p 2 PRBC with hgb repeat 7.9 , then repeat hgb 5.5 s/p 2 PRBC 7.0; total 4 PRBC in last 12 hrs   s/p Endoscopy w/duodenal ulcer s/p tx w/ epi and cautery  - hemodynamics stable for now however if re-bleeds will require IR intervention  - transition from PPI ggt to IV PPI  - restart TF  - GI input appreciated      #C-diff  Positive on 2/13  - c/w vanco 125mg q6h      #ESRD  Still makes urine, on HD via permacath ( placed 2/10 /22 R IJ )   - c/w midodrine 10 mg prior to HD t/th/sat  - c/w nephro nina qd     #Hypercalcemia  - c/w Sensipar 60mg qd    #BPH   - c/w Javier  - c/w Cardura    ENDO  DMT2  -NPO on d5 @ 40   -FS q 6 hrs with ISS q 6 hrs   ENDO: Suggest DC on Tradjenta 5mg daily, discontinue prior hypoglycemic agents/insulin, endo FU 4 weeks.      Hypothyroidism.    continue synthroid 20 mcg IV daily for now.  Will continue monitoring TFTs and FU.  Suggest to FU outpatient, repeat TFTs in 4-6 weeks.    HEME   Acute blood loss 2/2 UGIB 2/2 duodenal ulcer   -s/p 4 PRBC in last 12hrs   -hx of b/l RP bleed while on heparin gtt s/o embolization   -hold AC  -c/ w folic acid   -CBC q 6 hrs   -goal hgb > 7  -goal INR <1.5  -goal platelets  > 100      #Hx of b/l RP bleed during this admission   -s/p Abdominal Angiography and Embolization on 10/29/2021 by Interventional radiology of l4and l5 lumbar artery     ID  Sepsis 2/2 c diff  -will resume vancomycin 125 mg q 6 hrs via PEG when GI stable   -trend WBC  -trend T curve     PPX  GI ppx ; PPI gtt  DVT ppx ; compression devices

## 2022-02-17 NOTE — PROGRESS NOTE ADULT - ASSESSMENT
Assessment  DMT2: 71y Male with DM T2 with hyperglycemia, A1C 6.4%, was on insulin at home, now on insulin coverage only, blood sugars are trending within acceptable range, no hypoglycemias. Patient with acute GIB, transferred to the icu s/p egd, intubated, NPO.  Hypothyroidism: Patient has no history thyroid disease, was not on any thyroid supplements, subclinical with low-normal FT4 and lethargy, on synthroid 12.5 mcg IV daily, repeat  TFTs show subclinical state, increased to synthroid 20mcg IV.  Hypercalcemia: Started on Sensipar 60mg daily, Ca improving.  CHF: on medications, stable, monitored.  HTN: Controlled,  on antihypertensive medications.  Parkinsons: on meds, monitored.  CKD: Monitor labs/BMP       Assessment  DMT2: 71y Male with DM T2 with hyperglycemia, A1C 6.4%, was on insulin at home, now on insulin coverage only, blood sugars are trending within acceptable range, no hypoglycemias. Patient with acute GIB, transferred to the icu s/p egd, intubated, NPO.  Hypothyroidism: Patient has no history thyroid disease, was not on any thyroid supplements, subclinical with low-normal FT4 and lethargy, on synthroid 12.5 mcg IV daily, repeat   TFTs show subclinical state, increased to synthroid 20mcg IV.  Hypercalcemia: Started on Sensipar 60mg daily, Ca improving.  CHF: on medications, stable, monitored.  HTN: Controlled,  on antihypertensive medications.  Parkinsons: on meds, monitored.  CKD: Monitor labs/BMP

## 2022-02-17 NOTE — PROGRESS NOTE ADULT - ASSESSMENT
71M w/ HTN, DM2, CAD-CABG, Parkinson's disease, and advanced CKD, 10/21/21 p/w LE swelling, c/b COVID; now newly ESRD-HD as of this admission    (1)Renal - ESRD-HD   (2)Anemia- significant drop in H/H;    (3)Failure to thrive and confusion     RECOMMEND  (1)SP EGD and remains intubated p the procedure and recovering in the MICU sp cautery.  Protonix gtt at present.  If he bleeds again then will need IR   (2)HD today and remove 1 liter   (3)Hopeful extubation today   (4) PO Vanco for C diff     At some point another goals of discussion   DW ICU in detail   >60 minutes spent on total encounter and more then 50% of the visit was spent counseling and/or coordinating care by the attending physician     Sayed Rodriguez  [facility]   [phone]

## 2022-02-17 NOTE — PROGRESS NOTE ADULT - SUBJECTIVE AND OBJECTIVE BOX
Chief complaint  Patient is a 71y old  Male who presents with a chief complaint of leg swelling (17 Feb 2022 13:34)   Review of systems  Acute events noted, patient transferred to icu s/p egd, intubated, no hypoglycemic episodes.    Labs and Fingersticks  CAPILLARY BLOOD GLUCOSE      POCT Blood Glucose.: 129 mg/dL (17 Feb 2022 11:51)  POCT Blood Glucose.: 136 mg/dL (17 Feb 2022 04:55)  POCT Blood Glucose.: 178 mg/dL (16 Feb 2022 20:41)      Anion Gap, Serum: 15 (02-17 @ 00:23)  Anion Gap, Serum: 15 (02-16 @ 21:35)  Anion Gap, Serum: 11 (02-16 @ 07:06)      Calcium, Total Serum: 8.2 *L* (02-17 @ 00:23)  Calcium, Total Serum: 8.3 *L* (02-16 @ 21:35)  Calcium, Total Serum: 7.7 *L* (02-16 @ 07:06)  Albumin, Serum: 1.8 *L* (02-17 @ 00:23)  Albumin, Serum: 1.9 *L* (02-16 @ 21:35)  Albumin, Serum: 1.8 *L* (02-16 @ 07:06)    Alanine Aminotransferase (ALT/SGPT): <5 *L* (02-17 @ 00:23)  Alanine Aminotransferase (ALT/SGPT): <5 *L* (02-16 @ 21:35)  Alanine Aminotransferase (ALT/SGPT): <5 *L* (02-16 @ 07:06)  Alkaline Phosphatase, Serum: 65 (02-17 @ 00:23)  Alkaline Phosphatase, Serum: 68 (02-16 @ 21:35)  Alkaline Phosphatase, Serum: 60 (02-16 @ 07:06)  Aspartate Aminotransferase (AST/SGOT): 23 (02-17 @ 00:23)  Aspartate Aminotransferase (AST/SGOT): 23 (02-16 @ 21:35)  Aspartate Aminotransferase (AST/SGOT): 21 (02-16 @ 07:06)        02-17    130<L>  |  96  |  75<H>  ----------------------------<  154<H>  3.7   |  19<L>  |  2.78<H>    Ca    8.2<L>      17 Feb 2022 00:23  Phos  3.7     02-17  Mg     1.7     02-17    TPro  5.6<L>  /  Alb  1.8<L>  /  TBili  0.6  /  DBili  x   /  AST  23  /  ALT  <5<L>  /  AlkPhos  65  02-17                        9.9    13.96 )-----------( 174      ( 17 Feb 2022 09:57 )             28.6     Medications  MEDICATIONS  (STANDING):  atorvastatin 80 milliGRAM(s) Oral at bedtime  carbidopa/levodopa  25/100 2.5 Tablet(s) Oral <User Schedule>  carbidopa/levodopa  25/100 2 Tablet(s) Oral <User Schedule>  chlorhexidine 0.12% Liquid 15 milliLiter(s) Oral Mucosa every 12 hours  chlorhexidine 4% Liquid 1 Application(s) Topical <User Schedule>  chlorhexidine 4% Liquid 1 Application(s) Topical <User Schedule>  dextrose 40% Gel 15 Gram(s) Oral once  dextrose 5%. 1000 milliLiter(s) (50 mL/Hr) IV Continuous <Continuous>  dextrose 5%. 1000 milliLiter(s) (100 mL/Hr) IV Continuous <Continuous>  dextrose 5%. 1000 milliLiter(s) (20 mL/Hr) IV Continuous <Continuous>  dextrose 50% Injectable 25 Gram(s) IV Push once  dextrose 50% Injectable 25 Gram(s) IV Push once  dextrose 50% Injectable 12.5 Gram(s) IV Push once  dextrose 50% Injectable 25 Gram(s) IV Push once  diVALproex Sprinkle 250 milliGRAM(s) Oral three times a day  doxazosin 1 milliGRAM(s) Oral at bedtime  DULoxetine 20 milliGRAM(s) Oral daily  epoetin mari-epbx (RETACRIT) Injectable 99566 Unit(s) IV Push once  folic acid 1 milliGRAM(s) Oral daily  glucagon  Injectable 1 milliGRAM(s) IntraMuscular once  insulin lispro (ADMELOG) corrective regimen sliding scale   SubCutaneous every 6 hours  levothyroxine Injectable 30 MICROGram(s) IV Push at bedtime  memantine 5 milliGRAM(s) Oral at bedtime  metoprolol tartrate Injectable 5 milliGRAM(s) IV Push every 6 hours  midodrine 10 milliGRAM(s) Oral <User Schedule>  mirtazapine 7.5 milliGRAM(s) Oral daily  Nephro-celeste 1 Tablet(s) Oral daily  norepinephrine Infusion 0.08 MICROgram(s)/kG/Min (14.5 mL/Hr) IV Continuous <Continuous>  pantoprazole Infusion 8 mG/Hr (10 mL/Hr) IV Continuous <Continuous>  propofol Infusion 20 MICROgram(s)/kG/Min (11.6 mL/Hr) IV Continuous <Continuous>  QUEtiapine 50 milliGRAM(s) Oral at bedtime  trihexyphenidyl 2 milliGRAM(s) Oral three times a day  vancomycin    Solution 125 milliGRAM(s) Oral every 6 hours      Physical Exam  General: Patient intubated  Vital Signs Last 12 Hrs  T(F): 98.1 (02-17-22 @ 12:00), Max: 98.2 (02-17-22 @ 08:00)  HR: 115 (02-17-22 @ 13:30) (113 - 128)  BP: --  BP(mean): --  RR: 15 (02-17-22 @ 13:30) (15 - 48)  SpO2: 100% (02-17-22 @ 13:30) (97% - 100%)       Chief complaint  Patient is a 71y old  Male who presents with a chief complaint of leg swelling (17 Feb 2022 13:34)   Review of systems  Acute events noted, patient transferred to icu s/p egd, intubated, no hypoglycemic episodes.    Labs and Fingersticks  CAPILLARY BLOOD GLUCOSE      POCT Blood Glucose.: 129 mg/dL (17 Feb 2022 11:51)  POCT Blood Glucose.: 136 mg/dL (17 Feb 2022 04:55)  POCT Blood Glucose.: 178 mg/dL (16 Feb 2022 20:41)      Anion Gap, Serum: 15 (02-17 @ 00:23)  Anion Gap, Serum: 15 (02-16 @ 21:35)  Anion Gap, Serum: 11 (02-16 @ 07:06)      Calcium, Total Serum: 8.2 *L* (02-17 @ 00:23)  Calcium, Total Serum: 8.3 *L* (02-16 @ 21:35)  Calcium, Total Serum: 7.7 *L* (02-16 @ 07:06)  Albumin, Serum: 1.8 *L* (02-17 @ 00:23)  Albumin, Serum: 1.9 *L* (02-16 @ 21:35)  Albumin, Serum: 1.8 *L* (02-16 @ 07:06)    Alanine Aminotransferase (ALT/SGPT): <5 *L* (02-17 @ 00:23)  Alanine Aminotransferase (ALT/SGPT): <5 *L* (02-16 @ 21:35)  Alanine Aminotransferase (ALT/SGPT): <5 *L* (02-16 @ 07:06)  Alkaline Phosphatase, Serum: 65 (02-17 @ 00:23)  Alkaline Phosphatase, Serum: 68 (02-16 @ 21:35)  Alkaline Phosphatase, Serum: 60 (02-16 @ 07:06)  Aspartate Aminotransferase (AST/SGOT): 23 (02-17 @ 00:23)  Aspartate Aminotransferase (AST/SGOT): 23 (02-16 @ 21:35)  Aspartate Aminotransferase (AST/SGOT): 21 (02-16 @ 07:06)        02-17    130<L>  |  96  |  75<H>  ----------------------------<  154<H>  3.7   |  19<L>  |  2.78<H>    Ca    8.2<L>      17 Feb 2022 00:23  Phos  3.7     02-17  Mg     1.7     02-17    TPro  5.6<L>  /  Alb  1.8<L>  /  TBili  0.6  /  DBili  x   /  AST  23  /  ALT  <5<L>  /  AlkPhos  65  02-17                        9.9    13.96 )-----------( 174      ( 17 Feb 2022 09:57 )             28.6     Medications  MEDICATIONS  (STANDING):  atorvastatin 80 milliGRAM(s) Oral at bedtime  carbidopa/levodopa  25/100 2.5 Tablet(s) Oral <User Schedule>  carbidopa/levodopa  25/100 2 Tablet(s) Oral <User Schedule>  chlorhexidine 0.12% Liquid 15 milliLiter(s) Oral Mucosa every 12 hours  chlorhexidine 4% Liquid 1 Application(s) Topical <User Schedule>  chlorhexidine 4% Liquid 1 Application(s) Topical <User Schedule>  dextrose 40% Gel 15 Gram(s) Oral once  dextrose 5%. 1000 milliLiter(s) (50 mL/Hr) IV Continuous <Continuous>  dextrose 5%. 1000 milliLiter(s) (100 mL/Hr) IV Continuous <Continuous>  dextrose 5%. 1000 milliLiter(s) (20 mL/Hr) IV Continuous <Continuous>  dextrose 50% Injectable 25 Gram(s) IV Push once  dextrose 50% Injectable 25 Gram(s) IV Push once  dextrose 50% Injectable 12.5 Gram(s) IV Push once  dextrose 50% Injectable 25 Gram(s) IV Push once  diVALproex Sprinkle 250 milliGRAM(s) Oral three times a day  doxazosin 1 milliGRAM(s) Oral at bedtime  DULoxetine 20 milliGRAM(s) Oral daily  epoetin mari-epbx (RETACRIT) Injectable 28491 Unit(s) IV Push once  folic acid 1 milliGRAM(s) Oral daily  glucagon  Injectable 1 milliGRAM(s) IntraMuscular once  insulin lispro (ADMELOG) corrective regimen sliding scale   SubCutaneous every 6 hours  levothyroxine Injectable 30 MICROGram(s) IV Push at bedtime  memantine 5 milliGRAM(s) Oral at bedtime  metoprolol tartrate Injectable 5 milliGRAM(s) IV Push every 6 hours  midodrine 10 milliGRAM(s) Oral <User Schedule>  mirtazapine 7.5 milliGRAM(s) Oral daily  Nephro-celeste 1 Tablet(s) Oral daily  norepinephrine Infusion 0.08 MICROgram(s)/kG/Min (14.5 mL/Hr) IV Continuous <Continuous>  pantoprazole Infusion 8 mG/Hr (10 mL/Hr) IV Continuous <Continuous>  propofol Infusion 20 MICROgram(s)/kG/Min (11.6 mL/Hr) IV Continuous <Continuous>  QUEtiapine 50 milliGRAM(s) Oral at bedtime  trihexyphenidyl 2 milliGRAM(s) Oral three times a day  vancomycin    Solution 125 milliGRAM(s) Oral every 6 hours      Physical Exam  General: Patient intubated  Vital Signs Last 12 Hrs  T(F): 98.1 (02-17-22 @ 12:00), Max: 98.2 (02-17-22 @ 08:00)  HR: 115 (02-17-22 @ 13:30) (113 - 128)  BP: --  BP(mean): --  RR: 15 (02-17-22 @ 13:30) (15 - 48)  SpO2: 100% (02-17-22 @ 13:30) (97% - 100%)

## 2022-02-17 NOTE — PROGRESS NOTE ADULT - SUBJECTIVE AND OBJECTIVE BOX
Patient is a 71y old  Male who presents with a chief complaint of leg swelling (17 Feb 2022 11:28)      SUBJECTIVE / OVERNIGHT EVENTS:    Patient seen and examined. transferred to ICU sp EGD. remains intubated. on levophed and propofol      Vital Signs Last 24 Hrs  T(C): 36.8 (17 Feb 2022 08:00), Max: 36.8 (17 Feb 2022 08:00)  T(F): 98.2 (17 Feb 2022 08:00), Max: 98.2 (17 Feb 2022 08:00)  HR: 125 (17 Feb 2022 10:00) (95 - 128)  BP: 119/62 (16 Feb 2022 19:45) (104/71 - 119/62)  BP(mean): 84 (16 Feb 2022 19:45) (84 - 84)  RR: 18 (17 Feb 2022 10:00) (12 - 45)  SpO2: 100% (17 Feb 2022 10:00) (97% - 100%)  I&O's Summary    16 Feb 2022 07:01  -  17 Feb 2022 07:00  --------------------------------------------------------  IN: 1099.1 mL / OUT: 250 mL / NET: 849.1 mL    17 Feb 2022 07:01  -  17 Feb 2022 12:35  --------------------------------------------------------  IN: 127.6 mL / OUT: 0 mL / NET: 127.6 mL        PE:  GENERAL: NAD, intubated  HEAD:  Atraumatic, Normocephalic  CHEST/LUNG: Coarse BS  HEART: Regular rate and rhythm; no murmur  ABDOMEN: Soft, Nontender, Nondistended; Bowel sounds present, peg binder  EXTREMITIES:  2+ Peripheral Pulses, No edema  NEURO: sedated    LABS:                        9.9    13.96 )-----------( 174      ( 17 Feb 2022 09:57 )             28.6     02-17    130<L>  |  96  |  75<H>  ----------------------------<  154<H>  3.7   |  19<L>  |  2.78<H>    Ca    8.2<L>      17 Feb 2022 00:23  Phos  3.7     02-17  Mg     1.7     02-17    TPro  5.6<L>  /  Alb  1.8<L>  /  TBili  0.6  /  DBili  x   /  AST  23  /  ALT  <5<L>  /  AlkPhos  65  02-17    PT/INR - ( 17 Feb 2022 01:19 )   PT: 11.8 sec;   INR: 0.98 ratio         PTT - ( 17 Feb 2022 01:19 )  PTT:37.5 sec  CAPILLARY BLOOD GLUCOSE      POCT Blood Glucose.: 129 mg/dL (17 Feb 2022 11:51)  POCT Blood Glucose.: 136 mg/dL (17 Feb 2022 04:55)  POCT Blood Glucose.: 178 mg/dL (16 Feb 2022 20:41)            RADIOLOGY & ADDITIONAL TESTS:    Imaging Personally Reviewed:  [x] YES  [ ] NO    Consultant(s) Notes Reviewed:  [x] YES  [ ] NO    MEDICATIONS  (STANDING):  atorvastatin 80 milliGRAM(s) Oral at bedtime  carbidopa/levodopa  25/100 2.5 Tablet(s) Oral <User Schedule>  carbidopa/levodopa  25/100 2 Tablet(s) Oral <User Schedule>  chlorhexidine 0.12% Liquid 15 milliLiter(s) Oral Mucosa every 12 hours  chlorhexidine 4% Liquid 1 Application(s) Topical <User Schedule>  chlorhexidine 4% Liquid 1 Application(s) Topical <User Schedule>  dextrose 40% Gel 15 Gram(s) Oral once  dextrose 5%. 1000 milliLiter(s) (50 mL/Hr) IV Continuous <Continuous>  dextrose 5%. 1000 milliLiter(s) (100 mL/Hr) IV Continuous <Continuous>  dextrose 5%. 1000 milliLiter(s) (20 mL/Hr) IV Continuous <Continuous>  dextrose 50% Injectable 25 Gram(s) IV Push once  dextrose 50% Injectable 25 Gram(s) IV Push once  dextrose 50% Injectable 12.5 Gram(s) IV Push once  dextrose 50% Injectable 25 Gram(s) IV Push once  diVALproex Sprinkle 250 milliGRAM(s) Oral three times a day  doxazosin 1 milliGRAM(s) Oral at bedtime  DULoxetine 20 milliGRAM(s) Oral daily  folic acid 1 milliGRAM(s) Oral daily  glucagon  Injectable 1 milliGRAM(s) IntraMuscular once  insulin lispro (ADMELOG) corrective regimen sliding scale   SubCutaneous every 6 hours  levothyroxine Injectable 30 MICROGram(s) IV Push at bedtime  memantine 5 milliGRAM(s) Oral at bedtime  metoprolol tartrate Injectable 5 milliGRAM(s) IV Push every 6 hours  midodrine 10 milliGRAM(s) Oral <User Schedule>  mirtazapine 7.5 milliGRAM(s) Oral daily  Nephro-celeste 1 Tablet(s) Oral daily  norepinephrine Infusion 0.08 MICROgram(s)/kG/Min (14.5 mL/Hr) IV Continuous <Continuous>  pantoprazole Infusion 8 mG/Hr (10 mL/Hr) IV Continuous <Continuous>  propofol Infusion 20 MICROgram(s)/kG/Min (11.6 mL/Hr) IV Continuous <Continuous>  QUEtiapine 50 milliGRAM(s) Oral at bedtime  trihexyphenidyl 2 milliGRAM(s) Oral three times a day  vancomycin    Solution 125 milliGRAM(s) Oral every 6 hours    MEDICATIONS  (PRN):  sodium chloride 0.9% lock flush 10 milliLiter(s) IV Push every 1 hour PRN Pre/post blood products, medications, blood draw, and to maintain line patency      Care Discussed with Consultants/Other Providers [x] YES  [ ] NO    HEALTH ISSUES - PROBLEM Dx:  Acute heart failure    Atrial flutter    DM2 (diabetes mellitus, type 2)    CAD (coronary artery disease)    Parkinsons disease    Acute on chronic renal failure    NSTEMI (non-ST elevation myocardial infarction)    Hypertension    Acute kidney injury superimposed on CKD    Anemia    Hypothyroidism    Delirium    Advanced care planning/counseling discussion    Palliative care status    Palliative care encounter    Pain    Stage 5 chronic kidney disease not on chronic dialysis    Hypercalcemia    Preop cardiovascular exam

## 2022-02-17 NOTE — PROGRESS NOTE ADULT - ASSESSMENT
70yo M w/ PMHx of CAD (s/p CABG 2019), CKD (unknown stage), DM2, Parkinson's Disease, HTN, depression presents with new onset acute heart failure exacerbation, NSTEMI, started on hep gtt, developed bl RP bleed, acute anemia. sp MICU course for hemorrhagic shock, 10/28 sp IR embolization l4 and l5 lumbar artery. sp permacath and AVF.    # chronic systolic and diastolic heart failure  # ESRD, new HD, AVF not matured, sp permacath  # Atrial flutter  # Parkinson's disease   # delirium  # CAD (coronary artery disease)  # HTN  # DM2 (diabetes mellitus, type 2)  # FTT sp PEG, dislodged and replaced  # C diff colitis  # GI bleed    UGIB likely due to GDA territory ulcer   IR embolization if re bleeds  PPI gtt  NPO  PO vanco for c diff  titrate pressors as tolerated  appreciate ICU care    DVT ppx hold AC    DNR/DNI    [facility]  [phone]     72yo M w/ PMHx of CAD (s/p CABG 2019), CKD (unknown stage), DM2, Parkinson's Disease, HTN, depression presents with new onset acute heart failure exacerbation, NSTEMI, started on hep gtt, developed bl RP bleed, acute anemia. sp MICU course for hemorrhagic shock, 10/28 sp IR embolization l4 and l5 lumbar artery. sp permacath and AVF.    # chronic systolic and diastolic heart failure  # ESRD, new HD, AVF not matured, sp permacath  # Atrial flutter  # Parkinson's disease   # delirium  # CAD (coronary artery disease)  # HTN  # DM2 (diabetes mellitus, type 2)  # FTT sp PEG, dislodged and replaced  # C diff colitis  # GI bleed  # acute resp failure    UGIB likely due to GDA territory ulcer   IR embolization if re bleeds  PPI gtt  NPO  transfuse prn  PO vanco for c diff  titrate pressors as tolerated  appreciate ICU care    DVT ppx hold AC    DNR/DNI    [facility]  [phone]

## 2022-02-17 NOTE — PROGRESS NOTE ADULT - ATTENDING COMMENTS
Patient seen and examined. Patient critically ill requiring frequent bedside visits with therapy change. Patient has had a prolonged hospitalization and now returns to MICU in setting of post-EGD for GI bleeding. Patient is a 71M initially hospitalized 10/2021 with a history of Parkinsons, CAD s/p CABG, and CKD3 who p/w NSTEMI and CHF. His hospital course was complicated by RP bleed and hemorrhagic shock requiring IR embolization. He then had acute on chronic renal failure requiring HD. He has had placement of AVF which is not yet mature and FTT with PEG placement. Most recently he has had GI bleeding for which he underwent EGD on 2/16. He was noted to have a significant duodenal bleed with uncertainty regarding control of bleeding and so was admitted to MICU while remaining intubated.    1. Acute Hypoxemic Respiratory Failure - continue mechanical ventilation and maintain O2 sat > 90%. Will wean sedation once Hb stable and after HD  - by report patient has had episodes of agitation/delirium and may need to be extubated quickly once sedation lightened  - no respiratory issues by report prior to EGD  2. Acute Blood Loss Anemia - continue to trend CBC and transfuse as needed to maintain Hb >7  - GI followup  - PPI  - Restart feeds when cleared by GI  3. End Stage Renal Failure - now on HD  - Plan for HD today  4. Cardiovascular - shock state likely due to hypovolemia and vasoplegia. Continue vasopressors to maintain MAP > 65  - reduced LV function on POCUS  5. Neuro - Parkinsons and intermittent agitation  6. DVT proph - SCDs given recent bleeding    Long term prognosis poor. Patient seen and examined. Patient critically ill requiring frequent bedside visits with therapy change. Patient has had a prolonged hospitalization and now returns to MICU in setting of post-EGD for GI bleeding. Patient is a 71M initially hospitalized 10/2021 with a history of Parkinsons, CAD s/p CABG, and CKD3 who p/w NSTEMI and CHF. His hospital course was complicated by RP bleed and hemorrhagic shock requiring IR embolization. He then had acute on chronic renal failure requiring HD. He has had placement of AVF which is not yet mature and FTT with PEG placement. Most recently he has had GI bleeding for which he underwent EGD on 2/16. He was noted to have a significant duodenal bleed with uncertainty regarding control of bleeding and so was admitted to MICU while remaining intubated.    1. Acute Hypoxemic Respiratory Failure - continue mechanical ventilation and maintain O2 sat > 90%. Will wean sedation once Hb stable and after HD  - by report patient has had episodes of agitation/delirium and may need to be extubated quickly once sedation lightened  - no respiratory issues by report prior to EGD  2. Acute Blood Loss Anemia - continue to trend CBC and transfuse as needed to maintain Hb >7  - GI followup - will need IR evaluation for embolization if rebleeds  - PPI  - Restart feeds when cleared by GI  3. End Stage Renal Failure - now on HD  - Plan for HD today  4. Cardiovascular - shock state likely due to hypovolemia and vasoplegia. Continue vasopressors to maintain MAP > 65  - reduced LV function on POCUS  5. Neuro - Parkinsons and intermittent agitation  6. DVT proph - SCDs given recent bleeding  7. Infectious Disease - continue PO vanco for C. diff. Contact precautions    Long term prognosis poor. Patient DNR/DNI. MOLST in chart.

## 2022-02-17 NOTE — PROGRESS NOTE ADULT - SUBJECTIVE AND OBJECTIVE BOX
**INCOMPLETE**    INTERVAL HPI/OVERNIGHT EVENTS:    SUBJECTIVE: Patient seen and examined at bedside. Intubated, sedated, ROS cannot be obtained.       VITAL SIGNS:  ICU Vital Signs Last 24 Hrs  T(C): 36.1 (17 Feb 2022 06:00), Max: 36.4 (16 Feb 2022 17:17)  T(F): 97 (17 Feb 2022 06:00), Max: 97.6 (16 Feb 2022 17:17)  HR: 127 (17 Feb 2022 06:45) (95 - 127)  BP: 119/62 (16 Feb 2022 19:45) (90/56 - 119/62)  BP(mean): 84 (16 Feb 2022 19:45) (84 - 84)  ABP: 93/50 (17 Feb 2022 06:45) (77/42 - 176/74)  ABP(mean): 64 (17 Feb 2022 06:45) (52 - 109)  RR: 18 (17 Feb 2022 06:45) (12 - 19)  SpO2: 98% (17 Feb 2022 06:45) (98% - 100%)    Mode: AC/ CMV (Assist Control/ Continuous Mandatory Ventilation), RR (machine): 18, TV (machine): 420, FiO2: 21, PEEP: 5, ITime: 0.7, MAP: 10, PIP: 29  Plateau pressure:   P/F ratio:     02-16 @ 07:01  -  02-17 @ 07:00  --------------------------------------------------------  IN: 1099.1 mL / OUT: 250 mL / NET: 849.1 mL      CAPILLARY BLOOD GLUCOSE      POCT Blood Glucose.: 136 mg/dL (17 Feb 2022 04:55)    ECG:    PHYSICAL EXAM:    General:   HEENT:   Neck:   Respiratory:   Cardiovascular:   Abdomen:   Extremities:  Neurological:    MEDICATIONS:  MEDICATIONS  (STANDING):  atorvastatin 80 milliGRAM(s) Oral at bedtime  carbidopa/levodopa  25/100 2.5 Tablet(s) Oral <User Schedule>  carbidopa/levodopa  25/100 2 Tablet(s) Oral <User Schedule>  chlorhexidine 0.12% Liquid 15 milliLiter(s) Oral Mucosa every 12 hours  chlorhexidine 4% Liquid 1 Application(s) Topical <User Schedule>  chlorhexidine 4% Liquid 1 Application(s) Topical <User Schedule>  dextrose 40% Gel 15 Gram(s) Oral once  dextrose 5%. 1000 milliLiter(s) (50 mL/Hr) IV Continuous <Continuous>  dextrose 5%. 1000 milliLiter(s) (100 mL/Hr) IV Continuous <Continuous>  dextrose 5%. 1000 milliLiter(s) (20 mL/Hr) IV Continuous <Continuous>  dextrose 50% Injectable 25 Gram(s) IV Push once  dextrose 50% Injectable 25 Gram(s) IV Push once  dextrose 50% Injectable 12.5 Gram(s) IV Push once  dextrose 50% Injectable 25 Gram(s) IV Push once  diVALproex Sprinkle 250 milliGRAM(s) Oral three times a day  DULoxetine 20 milliGRAM(s) Oral daily  folic acid 1 milliGRAM(s) Oral daily  glucagon  Injectable 1 milliGRAM(s) IntraMuscular once  insulin lispro (ADMELOG) corrective regimen sliding scale   SubCutaneous every 6 hours  levothyroxine Injectable 20 MICROGram(s) IV Push at bedtime  memantine 5 milliGRAM(s) Oral at bedtime  metoprolol tartrate Injectable 5 milliGRAM(s) IV Push every 6 hours  midodrine 10 milliGRAM(s) Oral <User Schedule>  mirtazapine 7.5 milliGRAM(s) Oral daily  Nephro-celeste 1 Tablet(s) Oral daily  norepinephrine Infusion 0.08 MICROgram(s)/kG/Min (14.5 mL/Hr) IV Continuous <Continuous>  pantoprazole Infusion 8 mG/Hr (10 mL/Hr) IV Continuous <Continuous>  propofol Infusion 20 MICROgram(s)/kG/Min (11.6 mL/Hr) IV Continuous <Continuous>  QUEtiapine 50 milliGRAM(s) Oral at bedtime  tamsulosin 0.4 milliGRAM(s) Oral at bedtime  trihexyphenidyl 2 milliGRAM(s) Oral three times a day  vancomycin    Solution 125 milliGRAM(s) Oral every 6 hours    MEDICATIONS  (PRN):  sodium chloride 0.9% lock flush 10 milliLiter(s) IV Push every 1 hour PRN Pre/post blood products, medications, blood draw, and to maintain line patency      ALLERGIES:  Allergies    adhesives (Rash)  latex (Urticaria)  No Known Drug Allergies    Intolerances        LABS:                        9.8    13.36 )-----------( 157      ( 17 Feb 2022 01:19 )             28.5     02-17    130<L>  |  96  |  75<H>  ----------------------------<  154<H>  3.7   |  19<L>  |  2.78<H>    Ca    8.2<L>      17 Feb 2022 00:23  Phos  3.7     02-17  Mg     1.7     02-17    TPro  5.6<L>  /  Alb  1.8<L>  /  TBili  0.6  /  DBili  x   /  AST  23  /  ALT  <5<L>  /  AlkPhos  65  02-17    PT/INR - ( 17 Feb 2022 01:19 )   PT: 11.8 sec;   INR: 0.98 ratio         PTT - ( 17 Feb 2022 01:19 )  PTT:37.5 sec      RADIOLOGY & ADDITIONAL TESTS: Reviewed. INTERVAL HPI/OVERNIGHT EVENTS: Started on Levo, increased prop. Hypothermic.    SUBJECTIVE: Patient seen and examined at bedside. Intubated, sedated, ROS cannot be obtained.     VITAL SIGNS:  ICU Vital Signs Last 24 Hrs  T(C): 36.1 (17 Feb 2022 06:00), Max: 36.4 (16 Feb 2022 17:17)  T(F): 97 (17 Feb 2022 06:00), Max: 97.6 (16 Feb 2022 17:17)  HR: 127 (17 Feb 2022 06:45) (95 - 127)  BP: 119/62 (16 Feb 2022 19:45) (90/56 - 119/62)  BP(mean): 84 (16 Feb 2022 19:45) (84 - 84)  ABP: 93/50 (17 Feb 2022 06:45) (77/42 - 176/74)  ABP(mean): 64 (17 Feb 2022 06:45) (52 - 109)  RR: 18 (17 Feb 2022 06:45) (12 - 19)  SpO2: 98% (17 Feb 2022 06:45) (98% - 100%)    Mode: AC/ CMV (Assist Control/ Continuous Mandatory Ventilation), RR (machine): 18, TV (machine): 420, FiO2: 21, PEEP: 5, ITime: 0.7, MAP: 10, PIP: 29  Plateau pressure:   P/F ratio:     02-16 @ 07:01  -  02-17 @ 07:00  --------------------------------------------------------  IN: 1099.1 mL / OUT: 250 mL / NET: 849.1 mL      CAPILLARY BLOOD GLUCOSE      POCT Blood Glucose.: 136 mg/dL (17 Feb 2022 04:55)    ECG:    PHYSICAL EXAM:    GENERAL: NAD, confused not oriented, arousable  NECK: No JVD  CHEST/LUNG: CTABL, No wheeze  HEART: Regular rate and rhythm; No murmurs, rubs, or gallops  ABDOMEN: Soft, mild tenderness diffusely, Nondistended; Bowel sounds present  EXTREMITIES:  left forearm avf, right chest wall permcath  SKIN: No rashes or lesions    MEDICATIONS:  MEDICATIONS  (STANDING):  atorvastatin 80 milliGRAM(s) Oral at bedtime  carbidopa/levodopa  25/100 2.5 Tablet(s) Oral <User Schedule>  carbidopa/levodopa  25/100 2 Tablet(s) Oral <User Schedule>  chlorhexidine 0.12% Liquid 15 milliLiter(s) Oral Mucosa every 12 hours  chlorhexidine 4% Liquid 1 Application(s) Topical <User Schedule>  chlorhexidine 4% Liquid 1 Application(s) Topical <User Schedule>  dextrose 40% Gel 15 Gram(s) Oral once  dextrose 5%. 1000 milliLiter(s) (50 mL/Hr) IV Continuous <Continuous>  dextrose 5%. 1000 milliLiter(s) (100 mL/Hr) IV Continuous <Continuous>  dextrose 5%. 1000 milliLiter(s) (20 mL/Hr) IV Continuous <Continuous>  dextrose 50% Injectable 25 Gram(s) IV Push once  dextrose 50% Injectable 25 Gram(s) IV Push once  dextrose 50% Injectable 12.5 Gram(s) IV Push once  dextrose 50% Injectable 25 Gram(s) IV Push once  diVALproex Sprinkle 250 milliGRAM(s) Oral three times a day  DULoxetine 20 milliGRAM(s) Oral daily  folic acid 1 milliGRAM(s) Oral daily  glucagon  Injectable 1 milliGRAM(s) IntraMuscular once  insulin lispro (ADMELOG) corrective regimen sliding scale   SubCutaneous every 6 hours  levothyroxine Injectable 20 MICROGram(s) IV Push at bedtime  memantine 5 milliGRAM(s) Oral at bedtime  metoprolol tartrate Injectable 5 milliGRAM(s) IV Push every 6 hours  midodrine 10 milliGRAM(s) Oral <User Schedule>  mirtazapine 7.5 milliGRAM(s) Oral daily  Nephro-celeste 1 Tablet(s) Oral daily  norepinephrine Infusion 0.08 MICROgram(s)/kG/Min (14.5 mL/Hr) IV Continuous <Continuous>  pantoprazole Infusion 8 mG/Hr (10 mL/Hr) IV Continuous <Continuous>  propofol Infusion 20 MICROgram(s)/kG/Min (11.6 mL/Hr) IV Continuous <Continuous>  QUEtiapine 50 milliGRAM(s) Oral at bedtime  tamsulosin 0.4 milliGRAM(s) Oral at bedtime  trihexyphenidyl 2 milliGRAM(s) Oral three times a day  vancomycin    Solution 125 milliGRAM(s) Oral every 6 hours    MEDICATIONS  (PRN):  sodium chloride 0.9% lock flush 10 milliLiter(s) IV Push every 1 hour PRN Pre/post blood products, medications, blood draw, and to maintain line patency      ALLERGIES:  Allergies    adhesives (Rash)  latex (Urticaria)  No Known Drug Allergies    Intolerances        LABS:                        9.8    13.36 )-----------( 157      ( 17 Feb 2022 01:19 )             28.5     02-17    130<L>  |  96  |  75<H>  ----------------------------<  154<H>  3.7   |  19<L>  |  2.78<H>    Ca    8.2<L>      17 Feb 2022 00:23  Phos  3.7     02-17  Mg     1.7     02-17    TPro  5.6<L>  /  Alb  1.8<L>  /  TBili  0.6  /  DBili  x   /  AST  23  /  ALT  <5<L>  /  AlkPhos  65  02-17    PT/INR - ( 17 Feb 2022 01:19 )   PT: 11.8 sec;   INR: 0.98 ratio         PTT - ( 17 Feb 2022 01:19 )  PTT:37.5 sec      RADIOLOGY & ADDITIONAL TESTS: Reviewed.

## 2022-02-18 NOTE — PROGRESS NOTE ADULT - ATTENDING COMMENTS
Patient seen and examined. Patient critically ill requiring frequent bedside visits with therapy change. Patient has had a prolonged hospitalization and now returns to MICU in setting of post-EGD for GI bleeding. Patient is a 71M initially hospitalized 10/2021 with a history of Parkinsons, CAD s/p CABG, and CKD3 who p/w NSTEMI and CHF. His hospital course was complicated by RP bleed and hemorrhagic shock requiring IR embolization. He then had acute on chronic renal failure requiring HD. He has had placement of AVF which is not yet mature and FTT with PEG placement. Most recently he has had GI bleeding for which he underwent EGD on 2/16. He was noted to have a significant duodenal bleed with uncertainty regarding control of bleeding and so was admitted to MICU while remaining intubated.    1. Acute Hypoxemic Respiratory Failure - continue mechanical ventilation and maintain O2 sat > 90%.   - Wean sedation and PSV with goal for extubation as able  - by report patient has had episodes of agitation/delirium and may need to be extubated quickly once sedation lightened  - no respiratory issues by report prior to EGD  2. Acute Blood Loss Anemia - continue to trend CBC and transfuse as needed to maintain Hb >7  - GI followup - will need IR evaluation for embolization if rebleeds though presently stable  - PPI  - Feeds via PEG  3. End Stage Renal Failure - now on HD  4. Cardiovascular - shock state likely due to hypovolemia and vasoplegia. Continue vasopressors to maintain MAP > 65  - reduced LV function on POCUS with indeterminate IVC  5. Neuro - Parkinsons and intermittent agitation  6. DVT proph - SCDs given recent bleeding  7. Infectious Disease - continue PO vanco for C. diff. Contact precautions    Long term prognosis poor. Patient DNR/DNI. MOLST in chart.

## 2022-02-18 NOTE — PROGRESS NOTE ADULT - ASSESSMENT
71 M w volume overload, GI consulted for drop in hgb    1. GI Bleeding  -s/p endoscopy 2/16 which showed spurting duodenal ulcer; ulcer injected with epi for hemostasis, cautery and hemospray used.  -hgb 9.7 today  -continue PPI infusion  -NPO  -if bleeding occurs again would need embolization by IR  -monitor for GI bleeding  -trend CBCs  - feeds on hold     2. ABBY on CKD per renal  -on HD via permacath per renal, midodrine during dialysis  -AVF not functional   -s/p tunnelled HD catheter insertion by IR 2/10    3. RP bleed  -s/p Abdominal Angiography and Embolization on 10/29/2021 by Interventional radiology of l4and l5 lumbar artery     3. C-diff  -on vanco     Attending supervision statement: I have personally seen and examined the patient. I fully participated in the care of this patient. I have made amendments to the documentation where necessary, and agree with the history, physical exam, and plan as outlined by the ACP.      [facility]  Gastroenterology and Hepatology  [address] [place], [place]  Office: [phone]  Cell: [phone]   71 M w volume overload, GI consulted for drop in hgb    1. GI Bleeding  -s/p endoscopy 2/16 which showed spurting duodenal ulcer; ulcer injected with epi for hemostasis, cautery and hemospray used.  -hgb 9.7 today  -continue PPI infusion  -may resume tube feeds  -if bleeding occurs again would need embolization by IR  -monitor for GI bleeding  -trend CBCs      2. ABBY on CKD per renal  -on HD via permacath per renal, midodrine during dialysis  -AVF not functional   -s/p tunnelled HD catheter insertion by IR 2/10    3. RP bleed  -s/p Abdominal Angiography and Embolization on 10/29/2021 by Interventional radiology of l4and l5 lumbar artery     3. C-diff  -on vanco     Attending supervision statement: I have personally seen and examined the patient. I fully participated in the care of this patient. I have made amendments to the documentation where necessary, and agree with the history, physical exam, and plan as outlined by the ACP.      [facility]  Gastroenterology and Hepatology  [address] [place], [place]  Office: [phone]  Cell: [phone]

## 2022-02-18 NOTE — PROGRESS NOTE ADULT - SUBJECTIVE AND OBJECTIVE BOX
Chief complaint  Patient is a 71y old  Male who presents with a chief complaint of leg swelling (18 Feb 2022 12:47)   Review of systems  Patient remains intubated, no hypoglycemic episodes.    Labs and Fingersticks  CAPILLARY BLOOD GLUCOSE      POCT Blood Glucose.: 154 mg/dL (18 Feb 2022 12:02)  POCT Blood Glucose.: 130 mg/dL (18 Feb 2022 05:03)  POCT Blood Glucose.: 145 mg/dL (17 Feb 2022 23:56)  POCT Blood Glucose.: 132 mg/dL (17 Feb 2022 17:05)      Anion Gap, Serum: 11 (02-18 @ 01:14)  Anion Gap, Serum: 15 (02-17 @ 00:23)  Anion Gap, Serum: 15 (02-16 @ 21:35)      Calcium, Total Serum: 7.8 *L* (02-18 @ 01:14)  Calcium, Total Serum: 8.2 *L* (02-17 @ 00:23)  Calcium, Total Serum: 8.3 *L* (02-16 @ 21:35)  Albumin, Serum: 1.7 *L* (02-18 @ 01:14)  Albumin, Serum: 1.8 *L* (02-17 @ 00:23)  Albumin, Serum: 1.9 *L* (02-16 @ 21:35)    Alanine Aminotransferase (ALT/SGPT): <5 *L* (02-18 @ 01:14)  Alanine Aminotransferase (ALT/SGPT): <5 *L* (02-17 @ 00:23)  Alanine Aminotransferase (ALT/SGPT): <5 *L* (02-16 @ 21:35)  Alkaline Phosphatase, Serum: 70 (02-18 @ 01:14)  Alkaline Phosphatase, Serum: 65 (02-17 @ 00:23)  Alkaline Phosphatase, Serum: 68 (02-16 @ 21:35)  Aspartate Aminotransferase (AST/SGOT): 25 (02-18 @ 01:14)  Aspartate Aminotransferase (AST/SGOT): 23 (02-17 @ 00:23)  Aspartate Aminotransferase (AST/SGOT): 23 (02-16 @ 21:35)        02-18    131<L>  |  97  |  32<H>  ----------------------------<  144<H>  3.4<L>   |  23  |  1.60<H>    Ca    7.8<L>      18 Feb 2022 01:14  Phos  2.0     02-18  Mg     1.7     02-18    TPro  5.5<L>  /  Alb  1.7<L>  /  TBili  0.6  /  DBili  x   /  AST  25  /  ALT  <5<L>  /  AlkPhos  70  02-18                        9.7    12.22 )-----------( 142      ( 18 Feb 2022 01:14 )             28.5     Medications  MEDICATIONS  (STANDING):  atorvastatin 80 milliGRAM(s) Oral at bedtime  carbidopa/levodopa  25/100 2.5 Tablet(s) Oral <User Schedule>  carbidopa/levodopa  25/100 2 Tablet(s) Oral <User Schedule>  chlorhexidine 0.12% Liquid 15 milliLiter(s) Oral Mucosa every 12 hours  chlorhexidine 4% Liquid 1 Application(s) Topical <User Schedule>  chlorhexidine 4% Liquid 1 Application(s) Topical <User Schedule>  dexMEDEtomidine Infusion 0.4 MICROgram(s)/kG/Hr (9.64 mL/Hr) IV Continuous <Continuous>  dextrose 40% Gel 15 Gram(s) Oral once  dextrose 5%. 1000 milliLiter(s) (50 mL/Hr) IV Continuous <Continuous>  dextrose 5%. 1000 milliLiter(s) (100 mL/Hr) IV Continuous <Continuous>  dextrose 5%. 1000 milliLiter(s) (20 mL/Hr) IV Continuous <Continuous>  dextrose 50% Injectable 25 Gram(s) IV Push once  dextrose 50% Injectable 12.5 Gram(s) IV Push once  dextrose 50% Injectable 25 Gram(s) IV Push once  diVALproex Sprinkle 250 milliGRAM(s) Oral three times a day  doxazosin 1 milliGRAM(s) Oral at bedtime  DULoxetine 20 milliGRAM(s) Oral daily  folic acid 1 milliGRAM(s) Oral daily  glucagon  Injectable 1 milliGRAM(s) IntraMuscular once  insulin lispro (ADMELOG) corrective regimen sliding scale   SubCutaneous every 6 hours  levothyroxine Injectable 30 MICROGram(s) IV Push at bedtime  memantine 5 milliGRAM(s) Oral at bedtime  metoprolol tartrate 25 milliGRAM(s) Oral two times a day  midodrine 15 milliGRAM(s) Oral every 8 hours  mirtazapine 7.5 milliGRAM(s) Oral daily  mupirocin 2% Nasal 1 Application(s) Both Nostrils two times a day  Nephro-celeste 1 Tablet(s) Oral daily  norepinephrine Infusion 0.08 MICROgram(s)/kG/Min (14.5 mL/Hr) IV Continuous <Continuous>  pantoprazole Infusion 8 mG/Hr (10 mL/Hr) IV Continuous <Continuous>  piperacillin/tazobactam IVPB.. 3.375 Gram(s) IV Intermittent every 8 hours  propofol Infusion 20 MICROgram(s)/kG/Min (11.6 mL/Hr) IV Continuous <Continuous>  QUEtiapine 50 milliGRAM(s) Oral at bedtime  trihexyphenidyl 2 milliGRAM(s) Oral three times a day  vancomycin    Solution 125 milliGRAM(s) Oral every 6 hours      Physical Exam  Vital Signs Last 12 Hrs  T(F): 98.4 (02-18-22 @ 12:00), Max: 100.2 (02-18-22 @ 08:00)  HR: 110 (02-18-22 @ 13:15) (97 - 134)  BP: --  BP(mean): --  RR: 25 (02-18-22 @ 13:15) (0 - 35)  SpO2: 99% (02-18-22 @ 13:15) (96% - 100%)       Chief complaint  Patient is a 71y old  Male who presents with a chief complaint of leg swelling (18 Feb 2022 12:47)   Review of systems  Patient remains intubated, no hypoglycemic episodes.    Labs and Fingersticks  CAPILLARY BLOOD GLUCOSE      POCT Blood Glucose.: 154 mg/dL (18 Feb 2022 12:02)  POCT Blood Glucose.: 130 mg/dL (18 Feb 2022 05:03)  POCT Blood Glucose.: 145 mg/dL (17 Feb 2022 23:56)  POCT Blood Glucose.: 132 mg/dL (17 Feb 2022 17:05)      Anion Gap, Serum: 11 (02-18 @ 01:14)  Anion Gap, Serum: 15 (02-17 @ 00:23)  Anion Gap, Serum: 15 (02-16 @ 21:35)      Calcium, Total Serum: 7.8 *L* (02-18 @ 01:14)  Calcium, Total Serum: 8.2 *L* (02-17 @ 00:23)  Calcium, Total Serum: 8.3 *L* (02-16 @ 21:35)  Albumin, Serum: 1.7 *L* (02-18 @ 01:14)  Albumin, Serum: 1.8 *L* (02-17 @ 00:23)  Albumin, Serum: 1.9 *L* (02-16 @ 21:35)    Alanine Aminotransferase (ALT/SGPT): <5 *L* (02-18 @ 01:14)  Alanine Aminotransferase (ALT/SGPT): <5 *L* (02-17 @ 00:23)  Alanine Aminotransferase (ALT/SGPT): <5 *L* (02-16 @ 21:35)  Alkaline Phosphatase, Serum: 70 (02-18 @ 01:14)  Alkaline Phosphatase, Serum: 65 (02-17 @ 00:23)  Alkaline Phosphatase, Serum: 68 (02-16 @ 21:35)  Aspartate Aminotransferase (AST/SGOT): 25 (02-18 @ 01:14)  Aspartate Aminotransferase (AST/SGOT): 23 (02-17 @ 00:23)  Aspartate Aminotransferase (AST/SGOT): 23 (02-16 @ 21:35)        02-18    131<L>  |  97  |  32<H>  ----------------------------<  144<H>  3.4<L>   |  23  |  1.60<H>    Ca    7.8<L>      18 Feb 2022 01:14  Phos  2.0     02-18  Mg     1.7     02-18    TPro  5.5<L>  /  Alb  1.7<L>  /  TBili  0.6  /  DBili  x   /  AST  25  /  ALT  <5<L>  /  AlkPhos  70  02-18                        9.7    12.22 )-----------( 142      ( 18 Feb 2022 01:14 )             28.5     Medications  MEDICATIONS  (STANDING):  atorvastatin 80 milliGRAM(s) Oral at bedtime  carbidopa/levodopa  25/100 2.5 Tablet(s) Oral <User Schedule>  carbidopa/levodopa  25/100 2 Tablet(s) Oral <User Schedule>  chlorhexidine 0.12% Liquid 15 milliLiter(s) Oral Mucosa every 12 hours  chlorhexidine 4% Liquid 1 Application(s) Topical <User Schedule>  chlorhexidine 4% Liquid 1 Application(s) Topical <User Schedule>  dexMEDEtomidine Infusion 0.4 MICROgram(s)/kG/Hr (9.64 mL/Hr) IV Continuous <Continuous>  dextrose 40% Gel 15 Gram(s) Oral once  dextrose 5%. 1000 milliLiter(s) (50 mL/Hr) IV Continuous <Continuous>  dextrose 5%. 1000 milliLiter(s) (100 mL/Hr) IV Continuous <Continuous>  dextrose 5%. 1000 milliLiter(s) (20 mL/Hr) IV Continuous <Continuous>  dextrose 50% Injectable 25 Gram(s) IV Push once  dextrose 50% Injectable 12.5 Gram(s) IV Push once  dextrose 50% Injectable 25 Gram(s) IV Push once  diVALproex Sprinkle 250 milliGRAM(s) Oral three times a day  doxazosin 1 milliGRAM(s) Oral at bedtime  DULoxetine 20 milliGRAM(s) Oral daily  folic acid 1 milliGRAM(s) Oral daily  glucagon  Injectable 1 milliGRAM(s) IntraMuscular once  insulin lispro (ADMELOG) corrective regimen sliding scale   SubCutaneous every 6 hours  levothyroxine Injectable 30 MICROGram(s) IV Push at bedtime  memantine 5 milliGRAM(s) Oral at bedtime  metoprolol tartrate 25 milliGRAM(s) Oral two times a day  midodrine 15 milliGRAM(s) Oral every 8 hours  mirtazapine 7.5 milliGRAM(s) Oral daily  mupirocin 2% Nasal 1 Application(s) Both Nostrils two times a day  Nephro-celeste 1 Tablet(s) Oral daily  norepinephrine Infusion 0.08 MICROgram(s)/kG/Min (14.5 mL/Hr) IV Continuous <Continuous>  pantoprazole Infusion 8 mG/Hr (10 mL/Hr) IV Continuous <Continuous>  piperacillin/tazobactam IVPB.. 3.375 Gram(s) IV Intermittent every 8 hours  propofol Infusion 20 MICROgram(s)/kG/Min (11.6 mL/Hr) IV Continuous <Continuous>  QUEtiapine 50 milliGRAM(s) Oral at bedtime  trihexyphenidyl 2 milliGRAM(s) Oral three times a day  vancomycin    Solution 125 milliGRAM(s) Oral every 6 hours      Physical Exam  Vital Signs Last 12 Hrs  T(F): 98.4 (02-18-22 @ 12:00), Max: 100.2 (02-18-22 @ 08:00)  HR: 110 (02-18-22 @ 13:15) (97 - 134)  BP: --  BP(mean): --  RR: 25 (02-18-22 @ 13:15) (0 - 35)  SpO2: 99% (02-18-22 @ 13:15) (96% - 100%)

## 2022-02-18 NOTE — PROGRESS NOTE ADULT - SUBJECTIVE AND OBJECTIVE BOX
INTERVAL HPI/OVERNIGHT EVENTS:    Patient seen and examined.    Remains intubated and on pressor support in MICU  No episodes of melena, brbpr as per RN    MEDICATIONS  (STANDING):  atorvastatin 80 milliGRAM(s) Oral at bedtime  carbidopa/levodopa  25/100 2.5 Tablet(s) Oral <User Schedule>  carbidopa/levodopa  25/100 2 Tablet(s) Oral <User Schedule>  chlorhexidine 0.12% Liquid 15 milliLiter(s) Oral Mucosa every 12 hours  chlorhexidine 4% Liquid 1 Application(s) Topical <User Schedule>  chlorhexidine 4% Liquid 1 Application(s) Topical <User Schedule>  dexMEDEtomidine Infusion 0.4 MICROgram(s)/kG/Hr (9.64 mL/Hr) IV Continuous <Continuous>  dextrose 40% Gel 15 Gram(s) Oral once  dextrose 5%. 1000 milliLiter(s) (50 mL/Hr) IV Continuous <Continuous>  dextrose 5%. 1000 milliLiter(s) (100 mL/Hr) IV Continuous <Continuous>  dextrose 5%. 1000 milliLiter(s) (20 mL/Hr) IV Continuous <Continuous>  dextrose 50% Injectable 25 Gram(s) IV Push once  dextrose 50% Injectable 12.5 Gram(s) IV Push once  dextrose 50% Injectable 25 Gram(s) IV Push once  diVALproex Sprinkle 250 milliGRAM(s) Oral three times a day  doxazosin 1 milliGRAM(s) Oral at bedtime  DULoxetine 20 milliGRAM(s) Oral daily  folic acid 1 milliGRAM(s) Oral daily  glucagon  Injectable 1 milliGRAM(s) IntraMuscular once  insulin lispro (ADMELOG) corrective regimen sliding scale   SubCutaneous every 6 hours  levothyroxine Injectable 30 MICROGram(s) IV Push at bedtime  memantine 5 milliGRAM(s) Oral at bedtime  metoprolol tartrate Injectable 5 milliGRAM(s) IV Push every 6 hours  midodrine 15 milliGRAM(s) Oral every 8 hours  mirtazapine 7.5 milliGRAM(s) Oral daily  mupirocin 2% Nasal 1 Application(s) Both Nostrils two times a day  Nephro-celeste 1 Tablet(s) Oral daily  norepinephrine Infusion 0.08 MICROgram(s)/kG/Min (14.5 mL/Hr) IV Continuous <Continuous>  pantoprazole Infusion 8 mG/Hr (10 mL/Hr) IV Continuous <Continuous>  piperacillin/tazobactam IVPB.. 3.375 Gram(s) IV Intermittent every 8 hours  propofol Infusion 20 MICROgram(s)/kG/Min (11.6 mL/Hr) IV Continuous <Continuous>  QUEtiapine 50 milliGRAM(s) Oral at bedtime  trihexyphenidyl 2 milliGRAM(s) Oral three times a day  vancomycin    Solution 125 milliGRAM(s) Oral every 6 hours    MEDICATIONS  (PRN):  sodium chloride 0.9% lock flush 10 milliLiter(s) IV Push every 1 hour PRN Pre/post blood products, medications, blood draw, and to maintain line patency      Allergies    adhesives (Rash)  latex (Urticaria)  No Known Drug Allergies    Intolerances    Review of Systems:  unable to obtain    Vital Signs Last 24 Hrs  T(C): 37.9 (18 Feb 2022 08:00), Max: 37.9 (18 Feb 2022 08:00)  T(F): 100.2 (18 Feb 2022 08:00), Max: 100.2 (18 Feb 2022 08:00)  HR: 126 (18 Feb 2022 11:42) (87 - 134)  BP: --  BP(mean): --  RR: 19 (18 Feb 2022 11:00) (0 - 35)  SpO2: 97% (18 Feb 2022 11:42) (96% - 100%)    PHYSICAL EXAM:    GENERAL:  Appears stated age, well-groomed, well-nourished, no distress  HEENT:  NC/AT,  conjunctivae anicteric, clear and pink, +ETT  CHEST:  intubated  HEART:  Regular rhythm, no JVD  ABDOMEN:  Soft, non-tender, non-distended, normoactive bowel sounds,  no masses , no hepatosplenomegaly, +gastrostomy   EXTREMITIES:  no cyanosis,clubbing or edema  NEURO:  unable to access   RECTAL: Deferred      LABS:                        9.7    12.22 )-----------( 142      ( 18 Feb 2022 01:14 )             28.5     02-18    131<L>  |  97  |  32<H>  ----------------------------<  144<H>  3.4<L>   |  23  |  1.60<H>    Ca    7.8<L>      18 Feb 2022 01:14  Phos  2.0     02-18  Mg     1.7     02-18    TPro  5.5<L>  /  Alb  1.7<L>  /  TBili  0.6  /  DBili  x   /  AST  25  /  ALT  <5<L>  /  AlkPhos  70  02-18    PT/INR - ( 17 Feb 2022 01:19 )   PT: 11.8 sec;   INR: 0.98 ratio         PTT - ( 17 Feb 2022 01:19 )  PTT:37.5 sec      RADIOLOGY & ADDITIONAL TESTS:

## 2022-02-18 NOTE — PROGRESS NOTE ADULT - SUBJECTIVE AND OBJECTIVE BOX
SUBJECTIVE / OVERNIGHT EVENTS:    patient seen and examined  cont on contact prec  remains intubdated    --------------------------------------------------------------------------------------------  LABS:                        9.7    12.22 )-----------( 142      ( 18 Feb 2022 01:14 )             28.5     02-18    131<L>  |  97  |  32<H>  ----------------------------<  144<H>  3.4<L>   |  23  |  1.60<H>    Ca    7.8<L>      18 Feb 2022 01:14  Phos  2.0     02-18  Mg     1.7     02-18    TPro  5.5<L>  /  Alb  1.7<L>  /  TBili  0.6  /  DBili  x   /  AST  25  /  ALT  <5<L>  /  AlkPhos  70  02-18    PT/INR - ( 17 Feb 2022 01:19 )   PT: 11.8 sec;   INR: 0.98 ratio         PTT - ( 17 Feb 2022 01:19 )  PTT:37.5 sec  CAPILLARY BLOOD GLUCOSE      POCT Blood Glucose.: 130 mg/dL (18 Feb 2022 05:03)  POCT Blood Glucose.: 145 mg/dL (17 Feb 2022 23:56)  POCT Blood Glucose.: 132 mg/dL (17 Feb 2022 17:05)  POCT Blood Glucose.: 129 mg/dL (17 Feb 2022 11:51)            RADIOLOGY & ADDITIONAL TESTS:    Imaging Personally Reviewed:  [x] YES  [ ] NO    Consultant(s) Notes Reviewed:  [x] YES  [ ] NO    MEDICATIONS  (STANDING):  albumin human  5% IVPB 250 milliLiter(s) IV Intermittent once  atorvastatin 80 milliGRAM(s) Oral at bedtime  carbidopa/levodopa  25/100 2.5 Tablet(s) Oral <User Schedule>  carbidopa/levodopa  25/100 2 Tablet(s) Oral <User Schedule>  chlorhexidine 0.12% Liquid 15 milliLiter(s) Oral Mucosa every 12 hours  chlorhexidine 4% Liquid 1 Application(s) Topical <User Schedule>  chlorhexidine 4% Liquid 1 Application(s) Topical <User Schedule>  dexMEDEtomidine Infusion 0.4 MICROgram(s)/kG/Hr (9.64 mL/Hr) IV Continuous <Continuous>  dextrose 40% Gel 15 Gram(s) Oral once  dextrose 5%. 1000 milliLiter(s) (50 mL/Hr) IV Continuous <Continuous>  dextrose 5%. 1000 milliLiter(s) (100 mL/Hr) IV Continuous <Continuous>  dextrose 5%. 1000 milliLiter(s) (20 mL/Hr) IV Continuous <Continuous>  dextrose 50% Injectable 25 Gram(s) IV Push once  dextrose 50% Injectable 12.5 Gram(s) IV Push once  dextrose 50% Injectable 25 Gram(s) IV Push once  diVALproex Sprinkle 250 milliGRAM(s) Oral three times a day  doxazosin 1 milliGRAM(s) Oral at bedtime  DULoxetine 20 milliGRAM(s) Oral daily  folic acid 1 milliGRAM(s) Oral daily  glucagon  Injectable 1 milliGRAM(s) IntraMuscular once  insulin lispro (ADMELOG) corrective regimen sliding scale   SubCutaneous every 6 hours  levothyroxine Injectable 30 MICROGram(s) IV Push at bedtime  magnesium sulfate  IVPB 1 Gram(s) IV Intermittent once  memantine 5 milliGRAM(s) Oral at bedtime  metoprolol tartrate Injectable 5 milliGRAM(s) IV Push every 6 hours  midodrine 15 milliGRAM(s) Oral every 8 hours  mirtazapine 7.5 milliGRAM(s) Oral daily  mupirocin 2% Nasal 1 Application(s) Both Nostrils two times a day  Nephro-celeste 1 Tablet(s) Oral daily  norepinephrine Infusion 0.08 MICROgram(s)/kG/Min (14.5 mL/Hr) IV Continuous <Continuous>  pantoprazole Infusion 8 mG/Hr (10 mL/Hr) IV Continuous <Continuous>  piperacillin/tazobactam IVPB.. 3.375 Gram(s) IV Intermittent every 8 hours  propofol Infusion 20 MICROgram(s)/kG/Min (11.6 mL/Hr) IV Continuous <Continuous>  QUEtiapine 50 milliGRAM(s) Oral at bedtime  trihexyphenidyl 2 milliGRAM(s) Oral three times a day  vancomycin    Solution 125 milliGRAM(s) Oral every 6 hours    MEDICATIONS  (PRN):  sodium chloride 0.9% lock flush 10 milliLiter(s) IV Push every 1 hour PRN Pre/post blood products, medications, blood draw, and to maintain line patency      Care Discussed with Consultants/Other Providers [x] YES  [ ] NO    Vital Signs Last 24 Hrs  T(C): 37.9 (18 Feb 2022 08:00), Max: 37.9 (18 Feb 2022 08:00)  T(F): 100.2 (18 Feb 2022 08:00), Max: 100.2 (18 Feb 2022 08:00)  HR: 127 (18 Feb 2022 09:15) (87 - 134)  BP: --  BP(mean): --  RR: 18 (18 Feb 2022 09:15) (0 - 48)  SpO2: 98% (18 Feb 2022 09:15) (96% - 100%)  I&O's Summary    17 Feb 2022 07:01  -  18 Feb 2022 07:00  --------------------------------------------------------  IN: 2284.4 mL / OUT: 1000 mL / NET: 1284.4 mL    18 Feb 2022 07:01  -  18 Feb 2022 10:49  --------------------------------------------------------  IN: 163.9 mL / OUT: 0 mL / NET: 163.9 mL    PHYSICAL EXAM:  GENERAL: NAD, intubated  HEAD:  Atraumatic, Normocephalic  CHEST/LUNG: Coarse BS  HEART: Regular rate and rhythm; no murmur  ABDOMEN: Soft, Nontender, Nondistended; Bowel sounds present, peg binder  EXTREMITIES:  2+ Peripheral Pulses, No edema  NEURO: sedated       SUBJECTIVE / OVERNIGHT EVENTS:    patient seen and examined  remains intubated    --------------------------------------------------------------------------------------------  LABS:                        9.7    12.22 )-----------( 142      ( 18 Feb 2022 01:14 )             28.5     02-18    131<L>  |  97  |  32<H>  ----------------------------<  144<H>  3.4<L>   |  23  |  1.60<H>    Ca    7.8<L>      18 Feb 2022 01:14  Phos  2.0     02-18  Mg     1.7     02-18    TPro  5.5<L>  /  Alb  1.7<L>  /  TBili  0.6  /  DBili  x   /  AST  25  /  ALT  <5<L>  /  AlkPhos  70  02-18    PT/INR - ( 17 Feb 2022 01:19 )   PT: 11.8 sec;   INR: 0.98 ratio         PTT - ( 17 Feb 2022 01:19 )  PTT:37.5 sec  CAPILLARY BLOOD GLUCOSE      POCT Blood Glucose.: 130 mg/dL (18 Feb 2022 05:03)  POCT Blood Glucose.: 145 mg/dL (17 Feb 2022 23:56)  POCT Blood Glucose.: 132 mg/dL (17 Feb 2022 17:05)  POCT Blood Glucose.: 129 mg/dL (17 Feb 2022 11:51)            RADIOLOGY & ADDITIONAL TESTS:    Imaging Personally Reviewed:  [x] YES  [ ] NO    Consultant(s) Notes Reviewed:  [x] YES  [ ] NO    MEDICATIONS  (STANDING):  albumin human  5% IVPB 250 milliLiter(s) IV Intermittent once  atorvastatin 80 milliGRAM(s) Oral at bedtime  carbidopa/levodopa  25/100 2.5 Tablet(s) Oral <User Schedule>  carbidopa/levodopa  25/100 2 Tablet(s) Oral <User Schedule>  chlorhexidine 0.12% Liquid 15 milliLiter(s) Oral Mucosa every 12 hours  chlorhexidine 4% Liquid 1 Application(s) Topical <User Schedule>  chlorhexidine 4% Liquid 1 Application(s) Topical <User Schedule>  dexMEDEtomidine Infusion 0.4 MICROgram(s)/kG/Hr (9.64 mL/Hr) IV Continuous <Continuous>  dextrose 40% Gel 15 Gram(s) Oral once  dextrose 5%. 1000 milliLiter(s) (50 mL/Hr) IV Continuous <Continuous>  dextrose 5%. 1000 milliLiter(s) (100 mL/Hr) IV Continuous <Continuous>  dextrose 5%. 1000 milliLiter(s) (20 mL/Hr) IV Continuous <Continuous>  dextrose 50% Injectable 25 Gram(s) IV Push once  dextrose 50% Injectable 12.5 Gram(s) IV Push once  dextrose 50% Injectable 25 Gram(s) IV Push once  diVALproex Sprinkle 250 milliGRAM(s) Oral three times a day  doxazosin 1 milliGRAM(s) Oral at bedtime  DULoxetine 20 milliGRAM(s) Oral daily  folic acid 1 milliGRAM(s) Oral daily  glucagon  Injectable 1 milliGRAM(s) IntraMuscular once  insulin lispro (ADMELOG) corrective regimen sliding scale   SubCutaneous every 6 hours  levothyroxine Injectable 30 MICROGram(s) IV Push at bedtime  magnesium sulfate  IVPB 1 Gram(s) IV Intermittent once  memantine 5 milliGRAM(s) Oral at bedtime  metoprolol tartrate Injectable 5 milliGRAM(s) IV Push every 6 hours  midodrine 15 milliGRAM(s) Oral every 8 hours  mirtazapine 7.5 milliGRAM(s) Oral daily  mupirocin 2% Nasal 1 Application(s) Both Nostrils two times a day  Nephro-celeste 1 Tablet(s) Oral daily  norepinephrine Infusion 0.08 MICROgram(s)/kG/Min (14.5 mL/Hr) IV Continuous <Continuous>  pantoprazole Infusion 8 mG/Hr (10 mL/Hr) IV Continuous <Continuous>  piperacillin/tazobactam IVPB.. 3.375 Gram(s) IV Intermittent every 8 hours  propofol Infusion 20 MICROgram(s)/kG/Min (11.6 mL/Hr) IV Continuous <Continuous>  QUEtiapine 50 milliGRAM(s) Oral at bedtime  trihexyphenidyl 2 milliGRAM(s) Oral three times a day  vancomycin    Solution 125 milliGRAM(s) Oral every 6 hours    MEDICATIONS  (PRN):  sodium chloride 0.9% lock flush 10 milliLiter(s) IV Push every 1 hour PRN Pre/post blood products, medications, blood draw, and to maintain line patency      Care Discussed with Consultants/Other Providers [x] YES  [ ] NO    Vital Signs Last 24 Hrs  T(C): 37.9 (18 Feb 2022 08:00), Max: 37.9 (18 Feb 2022 08:00)  T(F): 100.2 (18 Feb 2022 08:00), Max: 100.2 (18 Feb 2022 08:00)  HR: 127 (18 Feb 2022 09:15) (87 - 134)  BP: --  BP(mean): --  RR: 18 (18 Feb 2022 09:15) (0 - 48)  SpO2: 98% (18 Feb 2022 09:15) (96% - 100%)  I&O's Summary    17 Feb 2022 07:01  -  18 Feb 2022 07:00  --------------------------------------------------------  IN: 2284.4 mL / OUT: 1000 mL / NET: 1284.4 mL    18 Feb 2022 07:01  -  18 Feb 2022 10:49  --------------------------------------------------------  IN: 163.9 mL / OUT: 0 mL / NET: 163.9 mL    PHYSICAL EXAM:  GENERAL: NAD, intubated  HEAD:  Atraumatic, Normocephalic  CHEST/LUNG: Coarse BS  HEART: Regular rate and rhythm; no murmur  ABDOMEN: Soft, Nontender, Nondistended; Bowel sounds present, PEG   EXTREMITIES:  2+ Peripheral Pulses, No edema  NEURO: sedated

## 2022-02-18 NOTE — PROGRESS NOTE ADULT - ASSESSMENT
Neuro   - c/w propofol while intubated  - c/w neuro checks q4h, though unclear what the purpose is (no history of stroke or seizure etc)    #Parkinson disease   - c/w Sinemet 25/100 2.5 TAB 6 am and 2pm  - c/w Sinemet 25/100 2 TAB 10pm   - c/w Artane 2mg TID     #Depression   - c/w Cymbalta 20mg qd    - c/w Divalproex 250mg tid  - c/w Seroquel 50mg qhs  - c/w Remeron 7.5mg qd    #Dementia   - c/w Namenda 5mg qhs      CV  #CAD s/p CABG 2019  #HTN  #Heart failure  - c/w metoprolol 5mg IV q6h  - cont to hold Lasix iso GIB  - c/w atorvastatin 80mg qhs    PULM  No active pulmonary disease  Intubated prior to endoscopy, remained intubated post procedure for close monitoring in MICU   - extubate today in controlled MICU environment    GI  #Acute blood loss anemia 2/2 UGIB 2/2 duodenal ulcer w/ active bleeding vessel   hgb drop from 7.1 to 5.1  s/p 2 PRBC with hgb repeat 7.9 , then repeat hgb 5.5 s/p 2 PRBC 7.0; total 4 PRBC in last 12 hrs   s/p Endoscopy w/duodenal ulcer s/p tx w/ epi and cautery  - hemodynamics stable for now however if re-bleeds will require IR intervention  - transition from PPI ggt to IV PPI  - restart TF  - GI input appreciated      #C-diff  Positive on 2/13  - c/w vanco 125mg q6h      #ESRD  Still makes urine, on HD via permacath ( placed 2/10 /22 R IJ )   - c/w midodrine 10 mg prior to HD t/th/sat  - c/w nephro nina qd     #Hypercalcemia  - c/w Sensipar 60mg qd    #BPH   - c/w Javier  - c/w Cardura    ENDO  DMT2  -NPO on d5 @ 40   -FS q 6 hrs with ISS q 6 hrs   ENDO: Suggest DC on Tradjenta 5mg daily, discontinue prior hypoglycemic agents/insulin, endo FU 4 weeks.      Hypothyroidism.    continue synthroid 20 mcg IV daily for now.  Will continue monitoring TFTs and FU.  Suggest to FU outpatient, repeat TFTs in 4-6 weeks.    HEME   Acute blood loss 2/2 UGIB 2/2 duodenal ulcer   -s/p 4 PRBC in last 12hrs   -hx of b/l RP bleed while on heparin gtt s/o embolization   -hold AC  -c/ w folic acid   -CBC q 6 hrs   -goal hgb > 7  -goal INR <1.5  -goal platelets  > 100      #Hx of b/l RP bleed during this admission   -s/p Abdominal Angiography and Embolization on 10/29/2021 by Interventional radiology of l4and l5 lumbar artery     ID  Sepsis 2/2 c diff  -will resume vancomycin 125 mg q 6 hrs via PEG when GI stable   -trend WBC  -trend T curve     PPX  GI ppx ; PPI gtt  DVT ppx ; compression devices Assessment:  70yo M w/ PMHx of HTN,  CAD (s/p CABG 2019), CKD (unknown stage), DM2, Parkinson's Disease, depression, dementia  who presented on 10/21/21 p/w LE swelling, was found to be in new onset acute HF exacerbation and NSTEMI s/p hep gtt, s/p MICU course for hemorrhagic shock 2/2 b/l RP bleed s/p IR embolization l4 and l5 lumbar artery on 10/28. Hospital course c/b COVID & new ESRD-HD as of this admission, s/p PermCath and AVF (maturing), new PEG. Further c/b acute blood loss anemia 2/2 UGIB 2/2 duodenal ulcer w/ active bleeding vessel s/p endoscopy on 2/16 with epi and cauterization, remains intubated post procedure and admitted to MICU for monitoring and management.    Neuro   - Wean propofol, and switch to Precedex in prep for extubation  - Avoid haldo/zyprexa for agitation due to hx of PD; may use Ativan 0.25 prn if agitated when weaning sedation    #Parkinson disease   - c/w Sinemet 25/100 2.5 TAB 6 am and 2pm  - c/w Sinemet 25/100 2 TAB 10pm   - c/w Artane 2mg TID     #Depression   - c/w Cymbalta 20mg qd    - c/w Divalproex 250mg tid  - c/w Seroquel 50mg qhs  - c/w Remeron 7.5mg qd    #Dementia   - c/w Namenda 5mg qhs    CV  #Shock state: possible sepsis.   - Wean Levo as tolerated;   - Add midodrine standing to assist weaning;   - Treat sepsis empirically  - IVC 2.3, low albumin - give albumin 5% 250cc x 1    #Heart failure & CAD s/p CABG 2019  - c/w metoprolol 5mg IV q6h  - cont to hold Lasix iso GIB  - c/w atorvastatin 80mg qhs    PULM  - Recent COVID infection, PCR still positive, no isolation  - Intubated prior to endoscopy, remained intubated post procedure for close monitoring in MICU   - Alkalotic on vent; decreased TV   - PS trial when weaning sedation, and trial of extubation if able to tolerate    GI  #Acute blood loss anemia 2/2 UGIB 2/2 duodenal ulcer w/ active bleeding vessel   - S/p Endoscopy w/duodenal ulcer s/p tx w/ epi and cautery on 2/16  - If re-bleeds will require IR intervention  - Transition from PPI ggt to IV BID PPI on 2/19 per GI  - C/w TF as tolerated  - H&H stable; Trend CBC daily and keep active T&S     #C-diff  Positive on 2/13  - C/w vanco 125mg q6h via PEG x 10 days      #ESRD on HD via permacath (placed 2/10/22 R IJ)   - No urine output currently  - HD per nephro  - C/w nephro nina qd   - Replete lytes as appropriate  - c/w Sensipar 60mg qd for hypercalcemia    #BPH   - C/w Cardura    ENDO  #DMT2  - FS q 6 hrs with ISS q 6 hrs while on TF  - ENDO: Suggest DC on Tradjenta 5mg daily, discontinue prior hypoglycemic agents/insulin, endo FU 4 weeks.    #Hypothyroidism.   - Increased IV levothyroxine to 30 mg due to subclinical hypothyroidism and hypotension    HEME   # Acute blood loss 2/2 UGIB 2/2 duodenal ulcer   - Hold AC or DVT ppx  - H&H stable, trend CBC qd and active T&S    #Hx of b/l RP bleed during this admission   -s/p Abdominal Angiography and Embolization on 10/29/2021 by Interventional radiology of l4and l5 lumbar artery     ID  # Sepsis   - Pending Bcx, unclear source currently  - Empirical coverage with Zosyn  - MRSA swab neg (MSSA positive, started on mupirocin nasal ointment for 10 days)  - C/w vancomycin 125 mg q 6 hrs via PEG for C. diff    PPX  - GI ppx ; PPI gtt --> IV BID on 2/19  - DVT ppx ; compression devices    Ethics  - DNR

## 2022-02-18 NOTE — PROGRESS NOTE ADULT - ASSESSMENT
70yo M w/ PMHx of CAD (s/p CABG 2019), CKD (unknown stage), DM2, Parkinson's Disease, HTN, depression presents with new onset acute heart failure exacerbation, NSTEMI, started on hep gtt, developed bl RP bleed, acute anemia. sp MICU course for hemorrhagic shock, 10/28 sp IR embolization l4 and l5 lumbar artery. sp permacath and AVF.    # chronic systolic and diastolic heart failure  # ESRD, new HD, AVF not matured, sp permacath  # Atrial flutter  # Parkinson's disease   # delirium  # CAD (coronary artery disease)  # HTN  # DM2 (diabetes mellitus, type 2)  # FTT sp PEG, dislodged and replaced  # C diff colitis  # GI bleed  # acute resp failure    UGIB likely due to GDA territory ulcer   IR embolization if re bleeds  PPI gtt  NPO  transfuse prn  PO vanco for c diff  titrate pressors as tolerated  appreciate ICU care    DVT ppx hold AC    DNR/DNI    [facility]  [phone]     72yo M w/ PMHx of CAD (s/p CABG 2019), CKD (unknown stage), DM2, Parkinson's Disease, HTN, depression presents with new onset acute heart failure exacerbation, NSTEMI, started on hep gtt, developed bl RP bleed, acute anemia. sp MICU course for hemorrhagic shock, 10/28 sp IR embolization l4 and l5 lumbar artery. sp permacath and AVF.    # chronic systolic and diastolic heart failure  # ESRD, new HD, AVF not matured, sp permacath  # Atrial flutter  # Parkinson's disease   # delirium  # CAD (coronary artery disease)  # HTN  # DM2 (diabetes mellitus, type 2)  # FTT sp PEG, dislodged and replaced  # C diff colitis  # GI bleed  # acute resp failure    UGIB likely due to GDA territory ulcer   IR embolization if re bleeds  PPI gtt  NPO  transfuse prn  PO vanco for c diff  zosyn  titrate pressors as tolerated  appreciate ICU care    DVT ppx hold AC    DNR/DNI    [facility]  [phone]

## 2022-02-18 NOTE — CHART NOTE - NSCHARTNOTEFT_GEN_A_CORE
: Willow LA NP     INDICATION: Shock     PROCEDURE:  [x ] LIMITED ECHO  [x ] LIMITED CHEST  [ ] LIMITED RETROPERITONEAL  [ ] LIMITED ABDOMINAL  [ ] LIMITED DVT  [ ] NEEDLE GUIDANCE VASCULAR  [ ] NEEDLE GUIDANCE THORACENTESIS  [ ] NEEDLE GUIDANCE PARACENTESIS  [ ] NEEDLE GUIDANCE PERICARDIOCENTESIS  [ ] OTHER    FINDINGS:  Echo   RV smaller than LV   RV with preserved systolic function   LV with noted hypokinesis to medial base   (+) calcified leaflet noted on AV   No pericardial effusion noted   IVC noted to be 2.3 no respiratory variation noted     Chest   Some scattered b lines to right anterior chest wall   (+) right pleural base effusion with associated consolidation   Left anterior chest wall with A line predominance   Left Pleural base with pleural effusion and consolidation      INTERPRETATION:  LV (+) hypokinesis to medial  base   (+) calcified leaflet noted to AV   IVC 2.3   Bilateral pleural effusions with consolidation   Right anterior chest wall with some scattered b lines

## 2022-02-18 NOTE — CHART NOTE - NSCHARTNOTEFT_GEN_A_CORE
Nutrition Follow Up Note  Patient seen for: assessment warranted for length of ICU/hospital stay    Per chart: Pt is a 72 y/o M with PMHx HTN, DM2, CAD s/p CABG in 2019, Parkinson's, advanced CKD, presented with LE swelling caused by acute HF exacerbation, found to have NSTEMI, ESRD upon admission. Hospital course c/b COVID infection. Pt s/p tunneled catheter, with history of PEG feeding (was pulled out, replaced on ). Pt with loose stools, found to be C Diff positive on , transferred to MICU on  for hemorraghic shock. Since previous RD visit pt s/p endoscopy found to have upper GI bleed due to duodenal ulcer. Pt remains intubated and sedated, NPO post procedure.     Chart reviewed, events noted.    Source:    [x] Medical record           -If unable to interview patient: [x] Trach/Vent/BiPAP    Diet Order: NPO except medications    - Is current order appropriate/adequate? [x]  Other: current diet order appropriate in setting of current medical condition, however not meeting estimated nutrient needs.    - PO intake :   [x] N/A: pt currently NPO since , previously ordered for bolus enteral feeds.      Nutrition-related concerns: Pt visited at bedside however intubated/sedated, inappropriate to interview at this time. Information obtained from thorough review of medical record.  - Pt on pressors (norepinephrine), received 339 ml in the past 24 hours per MICU resident note requirements increasing, no plans to wean at this time.  - Propofol ordered - Pt has received 453 kcals via lipids in the last 24hrs; if infusion continues at current rate (14.3 mL/hr), will provide 378 kcals/day.   - Pt with ESRD on HD, last dialysis yesterday , 1000ml removed. Noted with low electrolytes (Na, K+, Phos) being repleted prn with IV sodium chloride, potassium chloride. Continue to replete aggressively as indicated.  - Of note while on floor pt ordered for bolus feeds of Nepro 330 ml q 6 hours, providing 1320ml total volume 2336 kcals, 107 grams protein, 960 ml free water based on     GI:  Last BM 2/17 x 4.   Bowel Regimen?  [x] No  Pt noted with frequent watery bowel movements likely in setting of C Diff infection.      Weights:   Daily Weight in k.4 (-), Weight in k (-15), Weight in k (02-15), Weight in k.2 (), Weight in k.7 (), Weight in k ()  Current dosing weight in k.4 Weight upon admission: 97.1 kg  Weight x 7 days ranging from 69.7-77.4 kg  Pt noted with weight fluctuations likely in setting of fluid shifts as pt with acute HF, ESRD on HD. Pt noted with edema, has received diuretics since admission (lasix, last dose ). RD to continue to monitor weight trends as able.     Nutritionally Pertinent Medications: IV D5 @ 20 ml/hr, insulin (admelog corrective), IV magnesium sulfate, sodium chloride 0.9% flush, IV synthroid, propofol @ 11.6 ml/hr, norepinephrine, atorvastatin, sinemet, folic acid, nephrovite, protonix    Labs:  Serum Na 131 mmol/L <L>, Serum K+  3.4 mg/dL <L>, BUN 32 mg/dL <H>, Serum Creatinine 1.6 mg/dL <H>,  mg/dL, Serum Phos 2.0 mg/dL.    Skin per nursing documentation: no pressure injuries reported  Edema per nursing documentation: 1+ edema in right arm    Estimated Needs: Based on current daily weight 77.4 kg with consideration for pt with malnutrition dx  [x] recalculated: in setting of pt currently intubated and sedated, critically ill, with weight loss since admission/dosing weight.  Estimated Energy Needs: (20-25 kcals/kg): 4420-7174 kcal/day  Estimated Protein Needs: (1.2-1.4g/kg):  g protein/day  Fluid needs deferred to provider.  The [place] State Equation (PSU) 2003b was used to calculate resting energy expenditure: 1811 kcal    Previous Nutrition Diagnosis: Food and Nutrition Related Knowledge Deficit, Severe acute on chronic malnutrition  Nutrition Diagnosis is: [x] ongoing    New Nutrition Diagnosis: [x] Not applicable    Nutrition Care Plan:  [x] In Progress    Nutrition Interventions:     Education Provided:       [x] No: Pt currently intubated and sedated, education not appropriate at this time.       Recommendations:         [x] As soon as medically feasible, advance pt diet via best tolerated route. If EN nutrition indicated, recommend start pt on Vital AF @ 10 ml/hr, increase q 6 hours as tolerated until goal rate of 50 ml/hr x 24 hours reached to promote tolerance in setting of pt with C Diff infection, on pressor support. EN as ordered + propofol (379 kcal) to provide 1200ml total volume, 1818 kcal (24 kcal/kg), 97 g protein (1.3 g/kg) and 973 ml free water daily based on current daily weight of 77.4 kg. Defer free fluid flushes to provider.            [] Add oral nutrition supplement:     [] Discontinue current diet order. Recommend:      [] Add micronutrient supplementation:      [x] Continue current micronutrient supplementation (nephrovite, folic acid) as tolerated/medically feasible     [x] Other: RD to remain available to adjust EN formulary as needed.    Monitoring and Evaluation:   Continue to monitor nutritional intake, tolerance to diet prescription, weights, labs, skin integrity.      RD remains available upon request and will follow up per protocol.  AMINA Seay, Dietetic Intern, Pager# [phone] Nutrition Follow Up Note  Patient seen for: assessment warranted for length of ICU/hospital stay    Per chart: Pt is a 70 y/o M with PMHx HTN, DM2, CAD s/p CABG in 2019, Parkinson's, advanced CKD, presented with LE swelling caused by acute HF exacerbation, found to have NSTEMI, ESRD upon admission. Hospital course c/b COVID infection. Pt s/p tunneled catheter, with history of PEG feeding (was pulled out, replaced on ). Pt with loose stools, found to be C Diff positive on , transferred to MICU on  for hemorraghic shock. Since previous RD visit pt s/p endoscopy found to have upper GI bleed due to duodenal ulcer. Pt remains intubated and sedated, NPO post procedure.     Chart reviewed, events noted.    Source:    [x] Medical record           -If unable to interview patient: [x] Trach/Vent/BiPAP    Diet Order: NPO except medications    - Is current order appropriate/adequate? [x]  Other: current diet order appropriate in setting of current medical condition, however not meeting estimated nutrient needs.      Nutrition-related concerns: Pt visited at bedside however intubated/sedated, inappropriate to interview at this time. Information obtained from thorough review of medical record.  - Pt on pressors (norepinephrine) at 25.3 ml/hr current rate. Per MICU resident note requirements increasing, no plans to wean at this time.  - Propofol ordered - Pt has received 453 kcals via lipids in the last 24hrs; if infusion continues at current rate (8.7 mL/hr), will provide 230 kcals/day.   - Pt with ESRD on HD, last dialysis yesterday , 1000ml removed. Noted with low electrolytes (Na, K+, Phos) being repleted prn with IV sodium chloride, potassium chloride. Continue to replete aggressively as indicated.  - Of note while on floor pt ordered for bolus feeds of Nepro 330 ml q 6 hours, providing 1320ml total volume 2336 kcals (30 kcal/kg), 107 grams protein (1.4 g/kg), 960 ml free water based on ideal body weight 78.2 kg.    GI:  Last BM 2/17 x 4.   Bowel Regimen?  [x] No  Pt noted with frequent watery bowel movements likely in setting of C Diff infection. per MICU resident note, pt with 1 small melena overnight.      Weights:   Daily Weight in k.4 (-), Weight in k (02-15), Weight in k (02-15), Weight in k.2 (), Weight in k.7 (), Weight in k ()  Current dosing weight in k.4 Weight upon admission in k.1.  Weight x 7 days ranging from 69.7-77.4 kg  Pt noted with weight fluctuations likely in setting of fluid shifts as pt with acute HF, ESRD on HD. Pt noted with edema, has received diuretics since admission (lasix, last dose ). RD to continue to monitor weight trends as able.     Nutritionally Pertinent Medications: IV D5 @ 20 ml/hr, insulin (admelog corrective), IV magnesium sulfate, sodium chloride 0.9% flush, IV synthroid, propofol @ 11.6 ml/hr, norepinephrine, atorvastatin, sinemet, folic acid, nephrovite, protonix    Labs:  Serum Na 131 mmol/L <L>, Serum K+  3.4 mg/dL <L>, BUN 32 mg/dL <H>, Serum Creatinine 1.6 mg/dL <H>,  mg/dL <H>, Serum Phos 2.0 mg/dL <L>.    Skin per nursing documentation: no pressure injuries reported  Edema per nursing documentation: 1+ edema in right arm    Estimated Needs: Based on current daily weight 77.4 kg with consideration for pt with malnutrition dx  [x] recalculated: in setting of pt currently intubated and sedated, critically ill, with weight loss since admission/dosing weight.  Estimated Energy Needs: (20-25 kcals/kg): 5208-5639 kcal/day  Estimated Protein Needs: (1.2-1.4g/kg):  g protein/day  Fluid needs deferred to provider.  The [place] State Equation (PSU) 2003b was used to calculate resting energy expenditure: 1811 kcal    Previous Nutrition Diagnosis: Food and Nutrition Related Knowledge Deficit, Increased Nutrient Needs, Severe acute on chronic malnutrition  Nutrition Diagnosis is: [x] ongoing    New Nutrition Diagnosis: [x] Not applicable    Nutrition Care Plan:  [x] In Progress    Nutrition Interventions:     Education Provided:       [x] No: Pt currently intubated and sedated, education not appropriate at this time.       Recommendations:         [x] As soon as medically feasible, advance pt diet via best tolerated route within goals of care. If EN nutrition indicated, recommend start pt on Vital AF @ 10 ml/hr, increase q 6 hours as tolerated until goal rate of 75 ml/hr x 18 hours reached to promote tolerance in setting of need for improved GI tolerance, pt on pressor support. EN as ordered + propofol (230 kcal) to provide 1350 ml total volume, 1850 kcal (24 kcal/kg), 101 g protein (1.3 g/kg) and 1095 ml free water daily based on current daily weight of 77.4 kg. Defer free fluid flushes to provider.     [x] Continue current micronutrient supplementation (nephrovite, folic acid) as tolerated/medically feasible     [x] Other: RD to remain available to adjust EN formulary as needed.    Monitoring and Evaluation:   Continue to monitor nutritional intake, tolerance to diet prescription, weights, labs, skin integrity.      RD remains available upon request and will follow up per protocol.  AMINA Seay, Dietetic Intern, Pager# [phone]

## 2022-02-18 NOTE — PROGRESS NOTE ADULT - ASSESSMENT
Assessment  DMT2: 71y Male with DM T2 with hyperglycemia, A1C 6.4%, was on insulin at home, now on insulin coverage only, blood sugars are trending within acceptable range, no hypoglycemias, remains intubated and NPO in the icu.  Hypothyroidism: Patient has no history thyroid disease, was not on any thyroid supplements, subclinical with low-normal FT4 and lethargy, on synthroid 12.5 mcg IV daily, repeat   TFTs show subclinical state, increased to synthroid 20mcg IV.  Hypercalcemia: Started on Sensipar 60mg daily, Ca improving.  CHF: on medications, stable, monitored.  HTN: Controlled,  on antihypertensive medications.  Parkinsons: on meds, monitored.  CKD: Monitor labs/BMP       Assessment  DMT2: 71y Male with DM T2 with hyperglycemia, A1C 6.4%, was on insulin at home, now on insulin coverage only, blood sugars are trending within acceptable range, no hypoglycemias,  remains intubated and NPO in the icu.  Hypothyroidism: Patient has no history thyroid disease, was not on any thyroid supplements, subclinical with low-normal FT4 and lethargy, on synthroid 12.5 mcg IV daily, repeat   TFTs show subclinical state, increased to synthroid 20mcg IV.  Hypercalcemia: Started on Sensipar 60mg daily, Ca improving.  CHF: on medications, stable, monitored.  HTN: Controlled,  on antihypertensive medications.  Parkinsons: on meds, monitored.  CKD: Monitor labs/BMP

## 2022-02-18 NOTE — PROGRESS NOTE ADULT - SUBJECTIVE AND OBJECTIVE BOX
INTERVAL HPI/OVERNIGHT EVENTS:      SUBJECTIVE: Patient seen and examined at bedside.       CONSTITUTIONAL: No fevers or chills  EYES/ENT: No visual or hearing changes;  No ear or throat pain   NECK: No pain or stiffness  RESPIRATORY: No cough, wheezing, hemoptysis; No shortness of breath  CARDIOVASCULAR: No chest pain or palpitations  GASTROINTESTINAL: No abdominal pain. No nausea, vomiting, or hematemesis; No diarrhea or constipation. No melena or hematochezia.  GENITOURINARY: No dysuria, frequency or hematuria  NEUROLOGICAL: No headache, numbness or weakness  SKIN: No itching, or new onset of rashes    OBJECTIVE:    VITAL SIGNS:  ICU Vital Signs Last 24 Hrs  T(C): 37.7 (18 Feb 2022 04:00), Max: 37.7 (18 Feb 2022 04:00)  T(F): 99.9 (18 Feb 2022 04:00), Max: 99.9 (18 Feb 2022 04:00)  HR: 121 (18 Feb 2022 06:45) (87 - 134)  BP: --  BP(mean): --  ABP: 130/56 (18 Feb 2022 06:45) (78/47 - 153/59)  ABP(mean): 80 (18 Feb 2022 06:45) (56 - 101)  RR: 18 (18 Feb 2022 06:45) (0 - 48)  SpO2: 99% (18 Feb 2022 06:45) (96% - 100%)    Mode: AC/ CMV (Assist Control/ Continuous Mandatory Ventilation), RR (machine): 18, TV (machine): 420, FiO2: 21, PEEP: 5, ITime: 1, MAP: 13, PIP: 23    02-17 @ 07:01  -  02-18 @ 07:00  --------------------------------------------------------  IN: 2209.9 mL / OUT: 1000 mL / NET: 1209.9 mL      CAPILLARY BLOOD GLUCOSE      POCT Blood Glucose.: 130 mg/dL (18 Feb 2022 05:03)      PHYSICAL EXAM:    General: NAD  HEENT: NC/AT; PERRL, clear conjunctiva  Neck: supple  Respiratory: Normal respiratory effort; CTA b/l  Cardiovascular: +S1/S2; RRR; no murmurs, gallops or rubs appreciated  Abdomen: soft, NT/ND, no masses noted; normal BS all 4 quadrants;  Extremities: 2+ peripheral pulses b/l; no LE edema  Skin: normal color and turgor; no rash  Neurological: A+O x 3, follows commands; spontaneously moving all 4 extremities, strength grossly WNL    MEDICATIONS:  MEDICATIONS  (STANDING):  atorvastatin 80 milliGRAM(s) Oral at bedtime  carbidopa/levodopa  25/100 2.5 Tablet(s) Oral <User Schedule>  carbidopa/levodopa  25/100 2 Tablet(s) Oral <User Schedule>  chlorhexidine 0.12% Liquid 15 milliLiter(s) Oral Mucosa every 12 hours  chlorhexidine 4% Liquid 1 Application(s) Topical <User Schedule>  chlorhexidine 4% Liquid 1 Application(s) Topical <User Schedule>  dextrose 40% Gel 15 Gram(s) Oral once  dextrose 5%. 1000 milliLiter(s) (20 mL/Hr) IV Continuous <Continuous>  dextrose 5%. 1000 milliLiter(s) (50 mL/Hr) IV Continuous <Continuous>  dextrose 5%. 1000 milliLiter(s) (100 mL/Hr) IV Continuous <Continuous>  dextrose 50% Injectable 25 Gram(s) IV Push once  dextrose 50% Injectable 12.5 Gram(s) IV Push once  dextrose 50% Injectable 25 Gram(s) IV Push once  diVALproex Sprinkle 250 milliGRAM(s) Oral three times a day  doxazosin 1 milliGRAM(s) Oral at bedtime  DULoxetine 20 milliGRAM(s) Oral daily  folic acid 1 milliGRAM(s) Oral daily  glucagon  Injectable 1 milliGRAM(s) IntraMuscular once  insulin lispro (ADMELOG) corrective regimen sliding scale   SubCutaneous every 6 hours  levothyroxine Injectable 30 MICROGram(s) IV Push at bedtime  memantine 5 milliGRAM(s) Oral at bedtime  metoprolol tartrate Injectable 5 milliGRAM(s) IV Push every 6 hours  midodrine 10 milliGRAM(s) Oral <User Schedule>  mirtazapine 7.5 milliGRAM(s) Oral daily  Nephro-celeste 1 Tablet(s) Oral daily  norepinephrine Infusion 0.08 MICROgram(s)/kG/Min (14.5 mL/Hr) IV Continuous <Continuous>  pantoprazole Infusion 8 mG/Hr (10 mL/Hr) IV Continuous <Continuous>  piperacillin/tazobactam IVPB.. 3.375 Gram(s) IV Intermittent every 8 hours  propofol Infusion 20 MICROgram(s)/kG/Min (11.6 mL/Hr) IV Continuous <Continuous>  QUEtiapine 50 milliGRAM(s) Oral at bedtime  trihexyphenidyl 2 milliGRAM(s) Oral three times a day  vancomycin    Solution 125 milliGRAM(s) Oral every 6 hours    MEDICATIONS  (PRN):  sodium chloride 0.9% lock flush 10 milliLiter(s) IV Push every 1 hour PRN Pre/post blood products, medications, blood draw, and to maintain line patency      ALLERGIES:  Allergies    adhesives (Rash)  latex (Urticaria)  No Known Drug Allergies    Intolerances              LABS:                        9.7    12.22 )-----------( 142      ( 18 Feb 2022 01:14 )             28.5     02-18    131<L>  |  97  |  32<H>  ----------------------------<  144<H>  3.4<L>   |  23  |  1.60<H>    Ca    7.8<L>      18 Feb 2022 01:14  Phos  2.0     02-18  Mg     1.7     02-18    TPro  5.5<L>  /  Alb  1.7<L>  /  TBili  0.6  /  DBili  x   /  AST  25  /  ALT  <5<L>  /  AlkPhos  70  02-18    PT/INR - ( 17 Feb 2022 01:19 )   PT: 11.8 sec;   INR: 0.98 ratio         PTT - ( 17 Feb 2022 01:19 )  PTT:37.5 sec      RADIOLOGY & ADDITIONAL TESTS: Reviewed. INTERVAL HPI/OVERNIGHT EVENTS:  H&H stable. Weaning down on prop, but trying to pull the lines. Levo requirement went up. T overnight 94.1 - 99.9F.     SUBJECTIVE: Patient seen and examined at bedside.       CONSTITUTIONAL: No fevers or chills  EYES/ENT: No visual or hearing changes;  No ear or throat pain   NECK: No pain or stiffness  RESPIRATORY: No cough, wheezing, hemoptysis; No shortness of breath  CARDIOVASCULAR: No chest pain or palpitations  GASTROINTESTINAL: No abdominal pain. No nausea, vomiting, or hematemesis; No diarrhea or constipation. No melena or hematochezia.  GENITOURINARY: No dysuria, frequency or hematuria  NEUROLOGICAL: No headache, numbness or weakness  SKIN: No itching, or new onset of rashes    OBJECTIVE:    VITAL SIGNS:  ICU Vital Signs Last 24 Hrs  T(C): 37.7 (18 Feb 2022 04:00), Max: 37.7 (18 Feb 2022 04:00)  T(F): 99.9 (18 Feb 2022 04:00), Max: 99.9 (18 Feb 2022 04:00)  HR: 121 (18 Feb 2022 06:45) (87 - 134)  BP: --  BP(mean): --  ABP: 130/56 (18 Feb 2022 06:45) (78/47 - 153/59)  ABP(mean): 80 (18 Feb 2022 06:45) (56 - 101)  RR: 18 (18 Feb 2022 06:45) (0 - 48)  SpO2: 99% (18 Feb 2022 06:45) (96% - 100%)    Mode: AC/ CMV (Assist Control/ Continuous Mandatory Ventilation), RR (machine): 18, TV (machine): 420, FiO2: 21, PEEP: 5, ITime: 1, MAP: 13, PIP: 23    02-17 @ 07:01  -  02-18 @ 07:00  --------------------------------------------------------  IN: 2209.9 mL / OUT: 1000 mL / NET: 1209.9 mL      CAPILLARY BLOOD GLUCOSE      POCT Blood Glucose.: 130 mg/dL (18 Feb 2022 05:03)      PHYSICAL EXAM:    General: NAD  HEENT: NC/AT; PERRL, clear conjunctiva  Neck: supple  Respiratory: Normal respiratory effort; CTA b/l  Cardiovascular: +S1/S2; RRR; no murmurs, gallops or rubs appreciated  Abdomen: soft, NT/ND, no masses noted; normal BS all 4 quadrants;  Extremities: 2+ peripheral pulses b/l; no LE edema  Skin: normal color and turgor; no rash  Neurological: A+O x 3, follows commands; spontaneously moving all 4 extremities, strength grossly WNL    MEDICATIONS:  MEDICATIONS  (STANDING):  atorvastatin 80 milliGRAM(s) Oral at bedtime  carbidopa/levodopa  25/100 2.5 Tablet(s) Oral <User Schedule>  carbidopa/levodopa  25/100 2 Tablet(s) Oral <User Schedule>  chlorhexidine 0.12% Liquid 15 milliLiter(s) Oral Mucosa every 12 hours  chlorhexidine 4% Liquid 1 Application(s) Topical <User Schedule>  chlorhexidine 4% Liquid 1 Application(s) Topical <User Schedule>  dextrose 40% Gel 15 Gram(s) Oral once  dextrose 5%. 1000 milliLiter(s) (20 mL/Hr) IV Continuous <Continuous>  dextrose 5%. 1000 milliLiter(s) (50 mL/Hr) IV Continuous <Continuous>  dextrose 5%. 1000 milliLiter(s) (100 mL/Hr) IV Continuous <Continuous>  dextrose 50% Injectable 25 Gram(s) IV Push once  dextrose 50% Injectable 12.5 Gram(s) IV Push once  dextrose 50% Injectable 25 Gram(s) IV Push once  diVALproex Sprinkle 250 milliGRAM(s) Oral three times a day  doxazosin 1 milliGRAM(s) Oral at bedtime  DULoxetine 20 milliGRAM(s) Oral daily  folic acid 1 milliGRAM(s) Oral daily  glucagon  Injectable 1 milliGRAM(s) IntraMuscular once  insulin lispro (ADMELOG) corrective regimen sliding scale   SubCutaneous every 6 hours  levothyroxine Injectable 30 MICROGram(s) IV Push at bedtime  memantine 5 milliGRAM(s) Oral at bedtime  metoprolol tartrate Injectable 5 milliGRAM(s) IV Push every 6 hours  midodrine 10 milliGRAM(s) Oral <User Schedule>  mirtazapine 7.5 milliGRAM(s) Oral daily  Nephro-celeste 1 Tablet(s) Oral daily  norepinephrine Infusion 0.08 MICROgram(s)/kG/Min (14.5 mL/Hr) IV Continuous <Continuous>  pantoprazole Infusion 8 mG/Hr (10 mL/Hr) IV Continuous <Continuous>  piperacillin/tazobactam IVPB.. 3.375 Gram(s) IV Intermittent every 8 hours  propofol Infusion 20 MICROgram(s)/kG/Min (11.6 mL/Hr) IV Continuous <Continuous>  QUEtiapine 50 milliGRAM(s) Oral at bedtime  trihexyphenidyl 2 milliGRAM(s) Oral three times a day  vancomycin    Solution 125 milliGRAM(s) Oral every 6 hours    MEDICATIONS  (PRN):  sodium chloride 0.9% lock flush 10 milliLiter(s) IV Push every 1 hour PRN Pre/post blood products, medications, blood draw, and to maintain line patency      ALLERGIES:  Allergies    adhesives (Rash)  latex (Urticaria)  No Known Drug Allergies    Intolerances              LABS:                        9.7    12.22 )-----------( 142      ( 18 Feb 2022 01:14 )             28.5     02-18    131<L>  |  97  |  32<H>  ----------------------------<  144<H>  3.4<L>   |  23  |  1.60<H>    Ca    7.8<L>      18 Feb 2022 01:14  Phos  2.0     02-18  Mg     1.7     02-18    TPro  5.5<L>  /  Alb  1.7<L>  /  TBili  0.6  /  DBili  x   /  AST  25  /  ALT  <5<L>  /  AlkPhos  70  02-18    PT/INR - ( 17 Feb 2022 01:19 )   PT: 11.8 sec;   INR: 0.98 ratio         PTT - ( 17 Feb 2022 01:19 )  PTT:37.5 sec      RADIOLOGY & ADDITIONAL TESTS: Reviewed. INTERVAL HPI/OVERNIGHT EVENTS:  1 small melena overnight. H&H stable. Weaning down on prop, but trying to pull the lines. Levo requirement went up. T overnight 94.1 - 99.9F. Tachycardic 120's -130's. 1L out via HD.   POCUS overnight revealed IVC 2.3 without respiratory variation, b/l pleff w/ consolidations, R lung w/ B-lines, and RV<LV.    SUBJECTIVE: Patient seen and examined at bedside.   Unable to obtain ROS 2/2 intubated and sedated.       OBJECTIVE:    VITAL SIGNS:  ICU Vital Signs Last 24 Hrs  T(C): 37.7 (18 Feb 2022 04:00), Max: 37.7 (18 Feb 2022 04:00)  T(F): 99.9 (18 Feb 2022 04:00), Max: 99.9 (18 Feb 2022 04:00)  HR: 121 (18 Feb 2022 06:45) (87 - 134)  BP: --  BP(mean): --  ABP: 130/56 (18 Feb 2022 06:45) (78/47 - 153/59)  ABP(mean): 80 (18 Feb 2022 06:45) (56 - 101)  RR: 18 (18 Feb 2022 06:45) (0 - 48)  SpO2: 99% (18 Feb 2022 06:45) (96% - 100%)    Mode: AC/ CMV (Assist Control/ Continuous Mandatory Ventilation), RR (machine): 18, TV (machine): 420, FiO2: 21, PEEP: 5, ITime: 1, MAP: 13, PIP: 23    02-17 @ 07:01  -  02-18 @ 07:00  --------------------------------------------------------  IN: 2209.9 mL / OUT: 1000 mL / NET: 1209.9 mL      CAPILLARY BLOOD GLUCOSE      POCT Blood Glucose.: 130 mg/dL (18 Feb 2022 05:03)      PHYSICAL EXAM:    General: NAD  HEENT: NC/AT; PERRL, clear conjunctiva  Neck: supple  Respiratory: Intubated, overbreathing the vent; Coarse upper airway sounds  Cardiovascular: +S1/S2; RR, tachycardic; no murmurs, gallops or rubs appreciated  Abdomen: soft, NT/ND, no masses noted; PEG site cleaning, no bleeding  Extremities: 2+ BUE edema, 1+ BLE edema  Skin: normal color and turgor; no rash  Neurological: Sedated. Withdrew to painful stimuli, but unable to assess fully    MEDICATIONS:  MEDICATIONS  (STANDING):  atorvastatin 80 milliGRAM(s) Oral at bedtime  carbidopa/levodopa  25/100 2.5 Tablet(s) Oral <User Schedule>  carbidopa/levodopa  25/100 2 Tablet(s) Oral <User Schedule>  chlorhexidine 0.12% Liquid 15 milliLiter(s) Oral Mucosa every 12 hours  chlorhexidine 4% Liquid 1 Application(s) Topical <User Schedule>  chlorhexidine 4% Liquid 1 Application(s) Topical <User Schedule>  dextrose 40% Gel 15 Gram(s) Oral once  dextrose 5%. 1000 milliLiter(s) (20 mL/Hr) IV Continuous <Continuous>  dextrose 5%. 1000 milliLiter(s) (50 mL/Hr) IV Continuous <Continuous>  dextrose 5%. 1000 milliLiter(s) (100 mL/Hr) IV Continuous <Continuous>  dextrose 50% Injectable 25 Gram(s) IV Push once  dextrose 50% Injectable 12.5 Gram(s) IV Push once  dextrose 50% Injectable 25 Gram(s) IV Push once  diVALproex Sprinkle 250 milliGRAM(s) Oral three times a day  doxazosin 1 milliGRAM(s) Oral at bedtime  DULoxetine 20 milliGRAM(s) Oral daily  folic acid 1 milliGRAM(s) Oral daily  glucagon  Injectable 1 milliGRAM(s) IntraMuscular once  insulin lispro (ADMELOG) corrective regimen sliding scale   SubCutaneous every 6 hours  levothyroxine Injectable 30 MICROGram(s) IV Push at bedtime  memantine 5 milliGRAM(s) Oral at bedtime  metoprolol tartrate Injectable 5 milliGRAM(s) IV Push every 6 hours  midodrine 10 milliGRAM(s) Oral <User Schedule>  mirtazapine 7.5 milliGRAM(s) Oral daily  Nephro-celeste 1 Tablet(s) Oral daily  norepinephrine Infusion 0.08 MICROgram(s)/kG/Min (14.5 mL/Hr) IV Continuous <Continuous>  pantoprazole Infusion 8 mG/Hr (10 mL/Hr) IV Continuous <Continuous>  piperacillin/tazobactam IVPB.. 3.375 Gram(s) IV Intermittent every 8 hours  propofol Infusion 20 MICROgram(s)/kG/Min (11.6 mL/Hr) IV Continuous <Continuous>  QUEtiapine 50 milliGRAM(s) Oral at bedtime  trihexyphenidyl 2 milliGRAM(s) Oral three times a day  vancomycin    Solution 125 milliGRAM(s) Oral every 6 hours    MEDICATIONS  (PRN):  sodium chloride 0.9% lock flush 10 milliLiter(s) IV Push every 1 hour PRN Pre/post blood products, medications, blood draw, and to maintain line patency      ALLERGIES:  Allergies    adhesives (Rash)  latex (Urticaria)  No Known Drug Allergies    Intolerances        LABS:                        9.7    12.22 )-----------( 142      ( 18 Feb 2022 01:14 )             28.5     02-18    131<L>  |  97  |  32<H>  ----------------------------<  144<H>  3.4<L>   |  23  |  1.60<H>    Ca    7.8<L>      18 Feb 2022 01:14  Phos  2.0     02-18  Mg     1.7     02-18    TPro  5.5<L>  /  Alb  1.7<L>  /  TBili  0.6  /  DBili  x   /  AST  25  /  ALT  <5<L>  /  AlkPhos  70  02-18    PT/INR - ( 17 Feb 2022 01:19 )   PT: 11.8 sec;   INR: 0.98 ratio         PTT - ( 17 Feb 2022 01:19 )  PTT:37.5 sec      RADIOLOGY & ADDITIONAL TESTS: Reviewed.

## 2022-02-19 NOTE — PROGRESS NOTE ADULT - ATTENDING COMMENTS
- Sedation as needed goal RASS -1 to 0  - Continue home antidepressants  - Continue mechanical ventilation, remains intubated for possible repeat endoscopy, if no plan for repeat endoscopy plan for extubation  - Titrate pressors as needed goal MAP >= 65  - Abx per ID  - PPI, IR if rebleeds  - Trend CBC, transfuse PRN for hgb < 7  - HD as per renal  - Free water restriction for hypernatremia 70 y/o M w/CAD and CKD initially admitted for NSTEMI and acute heart failure w/hospital course c/b hemorrhagic shock secondary to RP bleed s/p IR embolization and ABBY on CKD now w/ESRD requiring initiation of HD now with acute blood loss anemia and likely hemorrhagic shock secondary to UGIB s/p EGD with cauterization. Patient remained intubated post-procedure for acute respiratory failure.     - Sedation as needed goal RASS -1 to 0  - Continue home antidepressants  - Continue mechanical ventilation, remains intubated for possible repeat endoscopy, if no plan for repeat endoscopy plan for extubation  - Titrate pressors as needed goal MAP >= 65  - Continue abx, PO vanc for possible C. Diff. Specimen was indeterminate  - PPI, IR if rebleeds  - Trend CBC, transfuse PRN for hgb < 7  - HD as per renal  - Free water restriction for hyponatremia

## 2022-02-19 NOTE — PROGRESS NOTE ADULT - SUBJECTIVE AND OBJECTIVE BOX
Chief complaint    Patient is a 71y old  Male who presents with a chief complaint of leg swelling (19 Feb 2022 14:05)   Review of systems  Patient in bed, appears comfortable.    Labs and Fingersticks  CAPILLARY BLOOD GLUCOSE      POCT Blood Glucose.: 137 mg/dL (19 Feb 2022 12:14)  POCT Blood Glucose.: 131 mg/dL (19 Feb 2022 05:55)  POCT Blood Glucose.: 141 mg/dL (18 Feb 2022 18:21)      Anion Gap, Serum: 13 (02-19 @ 01:06)  Anion Gap, Serum: 11 (02-18 @ 01:14)      Calcium, Total Serum: 7.8 *L* (02-19 @ 01:06)  Calcium, Total Serum: 7.8 *L* (02-18 @ 01:14)  Albumin, Serum: 1.7 *L* (02-19 @ 01:06)  Albumin, Serum: 1.7 *L* (02-18 @ 01:14)    Alanine Aminotransferase (ALT/SGPT): <5 *L* (02-19 @ 01:06)  Alanine Aminotransferase (ALT/SGPT): <5 *L* (02-18 @ 01:14)  Alkaline Phosphatase, Serum: 67 (02-19 @ 01:06)  Alkaline Phosphatase, Serum: 70 (02-18 @ 01:14)  Aspartate Aminotransferase (AST/SGOT): 20 (02-19 @ 01:06)  Aspartate Aminotransferase (AST/SGOT): 25 (02-18 @ 01:14)        02-19    129<L>  |  97  |  38<H>  ----------------------------<  157<H>  3.3<L>   |  19<L>  |  2.38<H>    Ca    7.8<L>      19 Feb 2022 01:06  Phos  3.3     02-19  Mg     1.9     02-19    TPro  5.5<L>  /  Alb  1.7<L>  /  TBili  0.7  /  DBili  x   /  AST  20  /  ALT  <5<L>  /  AlkPhos  67  02-19                        8.8    16.17 )-----------( 119      ( 19 Feb 2022 01:05 )             26.8     Medications  MEDICATIONS  (STANDING):  atorvastatin 80 milliGRAM(s) Oral at bedtime  carbidopa/levodopa  25/100 2.5 Tablet(s) Oral <User Schedule>  carbidopa/levodopa  25/100 2 Tablet(s) Oral <User Schedule>  chlorhexidine 0.12% Liquid 15 milliLiter(s) Oral Mucosa every 12 hours  chlorhexidine 4% Liquid 1 Application(s) Topical <User Schedule>  chlorhexidine 4% Liquid 1 Application(s) Topical <User Schedule>  dexMEDEtomidine Infusion 0.4 MICROgram(s)/kG/Hr (9.64 mL/Hr) IV Continuous <Continuous>  dextrose 40% Gel 15 Gram(s) Oral once  dextrose 5% + sodium chloride 0.9%. 1000 milliLiter(s) (20 mL/Hr) IV Continuous <Continuous>  dextrose 5%. 1000 milliLiter(s) (50 mL/Hr) IV Continuous <Continuous>  dextrose 5%. 1000 milliLiter(s) (100 mL/Hr) IV Continuous <Continuous>  dextrose 50% Injectable 25 Gram(s) IV Push once  dextrose 50% Injectable 12.5 Gram(s) IV Push once  dextrose 50% Injectable 25 Gram(s) IV Push once  diVALproex Sprinkle 250 milliGRAM(s) Oral three times a day  doxazosin 1 milliGRAM(s) Oral at bedtime  DULoxetine 20 milliGRAM(s) Oral daily  folic acid 1 milliGRAM(s) Oral daily  glucagon  Injectable 1 milliGRAM(s) IntraMuscular once  insulin lispro (ADMELOG) corrective regimen sliding scale   SubCutaneous every 6 hours  levothyroxine Injectable 30 MICROGram(s) IV Push at bedtime  memantine 5 milliGRAM(s) Oral at bedtime  metoprolol tartrate 25 milliGRAM(s) Oral two times a day  midodrine 15 milliGRAM(s) Oral every 8 hours  mirtazapine 7.5 milliGRAM(s) Oral daily  mupirocin 2% Nasal 1 Application(s) Both Nostrils two times a day  Nephro-celeste 1 Tablet(s) Oral daily  norepinephrine Infusion 0.08 MICROgram(s)/kG/Min (14.5 mL/Hr) IV Continuous <Continuous>  pantoprazole  Injectable 40 milliGRAM(s) IV Push every 12 hours  piperacillin/tazobactam IVPB.. 3.375 Gram(s) IV Intermittent every 8 hours  propofol Infusion 20 MICROgram(s)/kG/Min (11.6 mL/Hr) IV Continuous <Continuous>  QUEtiapine 50 milliGRAM(s) Oral at bedtime  trihexyphenidyl 2 milliGRAM(s) Oral three times a day  vancomycin    Solution 125 milliGRAM(s) Oral every 6 hours      Physical Exam  Culture - Sputum (collected 02-18-22 @ 22:58)  Source: .Sputum Sputum  Gram Stain (02-19-22 @ 07:25):    No polymorphonuclear leukocytes per low power field    No Squamous epithelial cells per low power field    Moderate Gram positive cocci in pairs seen per oil power field      General: Patient comfortable in bed  Vital Signs Last 12 Hrs  T(F): 97.9 (02-19-22 @ 12:00), Max: 99.1 (02-19-22 @ 08:00)  HR: 124 (02-19-22 @ 15:30) (57 - 124)  BP: --  BP(mean): --  RR: 18 (02-19-22 @ 14:30) (12 - 21)  SpO2: 100% (02-19-22 @ 15:30) (97% - 100%)  Neck: No palpable thyroid nodules.  CVS: S1S2, No murmurs  Respiratory: No wheezing, no crepitations  GI: Abdomen soft, bowel sounds positive  Musculoskeletal:  edema lower extremities.     Diagnostics    Vitamin D, 25-Hydroxy: AM Sched. Collection: 14-Feb-2022 06:00 (02-13 @ 07:00)  Thyroid Stimulating Hormone, Serum: AM Sched. Collection: 14-Feb-2022 06:00 (02-13 @ 07:00)  Free Thyroxine, Serum: AM Sched. Collection: 14-Feb-2022 06:00 (02-13 @ 07:00)  Free Thyroxine, Serum: AM Sched. Collection: 27-Jan-2022 06:00 (01-26 @ 08:06)  Thyroid Stimulating Hormone, Serum: AM Sched. Collection: 27-Jan-2022 06:00 (01-26 @ 08:06)

## 2022-02-19 NOTE — PROGRESS NOTE ADULT - SUBJECTIVE AND OBJECTIVE BOX
INTERVAL HPI/OVERNIGHT EVENTS:    SUBJECTIVE: Patient seen and examined at bedside.       VITAL SIGNS:  ICU Vital Signs Last 24 Hrs  T(C): 36.2 (19 Feb 2022 04:00), Max: 37.9 (18 Feb 2022 08:00)  T(F): 97.2 (19 Feb 2022 04:00), Max: 100.2 (18 Feb 2022 08:00)  HR: 60 (19 Feb 2022 06:30) (57 - 128)  BP: --  BP(mean): --  ABP: 112/49 (19 Feb 2022 06:30) (90/44 - 142/63)  ABP(mean): 71 (19 Feb 2022 06:30) (57 - 92)  RR: 15 (19 Feb 2022 06:30) (0 - 33)  SpO2: 99% (19 Feb 2022 06:30) (94% - 100%)    Mode: AC/ CMV (Assist Control/ Continuous Mandatory Ventilation), RR (machine): 12, TV (machine): 400, FiO2: 21, PEEP: 5, ITime: 1, MAP: 10, PIP: 31  Plateau pressure:   P/F ratio:     02-17 @ 07:01  -  02-18 @ 07:00  --------------------------------------------------------  IN: 2284.4 mL / OUT: 1000 mL / NET: 1284.4 mL    02-18 @ 07:01  - 02-19 @ 06:55  --------------------------------------------------------  IN: 2436.9 mL / OUT: 0 mL / NET: 2436.9 mL      CAPILLARY BLOOD GLUCOSE      POCT Blood Glucose.: 131 mg/dL (19 Feb 2022 05:55)    ECG:    PHYSICAL EXAM:    General:   HEENT:   Neck:   Respiratory:   Cardiovascular:   Abdomen:   Extremities:  Neurological:    MEDICATIONS:  MEDICATIONS  (STANDING):  atorvastatin 80 milliGRAM(s) Oral at bedtime  carbidopa/levodopa  25/100 2.5 Tablet(s) Oral <User Schedule>  carbidopa/levodopa  25/100 2 Tablet(s) Oral <User Schedule>  chlorhexidine 0.12% Liquid 15 milliLiter(s) Oral Mucosa every 12 hours  chlorhexidine 4% Liquid 1 Application(s) Topical <User Schedule>  chlorhexidine 4% Liquid 1 Application(s) Topical <User Schedule>  dexMEDEtomidine Infusion 0.4 MICROgram(s)/kG/Hr (9.64 mL/Hr) IV Continuous <Continuous>  dextrose 40% Gel 15 Gram(s) Oral once  dextrose 5% + sodium chloride 0.9%. 1000 milliLiter(s) (20 mL/Hr) IV Continuous <Continuous>  dextrose 5%. 1000 milliLiter(s) (50 mL/Hr) IV Continuous <Continuous>  dextrose 5%. 1000 milliLiter(s) (100 mL/Hr) IV Continuous <Continuous>  dextrose 50% Injectable 25 Gram(s) IV Push once  dextrose 50% Injectable 12.5 Gram(s) IV Push once  dextrose 50% Injectable 25 Gram(s) IV Push once  diVALproex Sprinkle 250 milliGRAM(s) Oral three times a day  doxazosin 1 milliGRAM(s) Oral at bedtime  DULoxetine 20 milliGRAM(s) Oral daily  folic acid 1 milliGRAM(s) Oral daily  glucagon  Injectable 1 milliGRAM(s) IntraMuscular once  insulin lispro (ADMELOG) corrective regimen sliding scale   SubCutaneous every 6 hours  levothyroxine Injectable 30 MICROGram(s) IV Push at bedtime  memantine 5 milliGRAM(s) Oral at bedtime  metoprolol tartrate 25 milliGRAM(s) Oral two times a day  midodrine 15 milliGRAM(s) Oral every 8 hours  mirtazapine 7.5 milliGRAM(s) Oral daily  mupirocin 2% Nasal 1 Application(s) Both Nostrils two times a day  Nephro-celeste 1 Tablet(s) Oral daily  norepinephrine Infusion 0.08 MICROgram(s)/kG/Min (14.5 mL/Hr) IV Continuous <Continuous>  pantoprazole Infusion 8 mG/Hr (10 mL/Hr) IV Continuous <Continuous>  piperacillin/tazobactam IVPB.. 3.375 Gram(s) IV Intermittent every 8 hours  propofol Infusion 20 MICROgram(s)/kG/Min (11.6 mL/Hr) IV Continuous <Continuous>  QUEtiapine 50 milliGRAM(s) Oral at bedtime  trihexyphenidyl 2 milliGRAM(s) Oral three times a day  vancomycin    Solution 125 milliGRAM(s) Oral every 6 hours    MEDICATIONS  (PRN):  sodium chloride 0.9% lock flush 10 milliLiter(s) IV Push every 1 hour PRN Pre/post blood products, medications, blood draw, and to maintain line patency      ALLERGIES:  Allergies    adhesives (Rash)  latex (Urticaria)  No Known Drug Allergies    Intolerances        LABS:                        8.8    16.17 )-----------( 119      ( 19 Feb 2022 01:05 )             26.8     02-19    129<L>  |  97  |  38<H>  ----------------------------<  157<H>  3.3<L>   |  19<L>  |  2.38<H>    Ca    7.8<L>      19 Feb 2022 01:06  Phos  3.3     02-19  Mg     1.9     02-19    TPro  5.5<L>  /  Alb  1.7<L>  /  TBili  0.7  /  DBili  x   /  AST  20  /  ALT  <5<L>  /  AlkPhos  67  02-19          RADIOLOGY & ADDITIONAL TESTS: Reviewed.   INTERVAL HPI/OVERNIGHT EVENTS:    SUBJECTIVE: Patient seen and examined at bedside.       VITAL SIGNS:  ICU Vital Signs Last 24 Hrs  T(C): 36.2 (19 Feb 2022 04:00), Max: 37.9 (18 Feb 2022 08:00)  T(F): 97.2 (19 Feb 2022 04:00), Max: 100.2 (18 Feb 2022 08:00)  HR: 60 (19 Feb 2022 06:30) (57 - 128)  BP: --  BP(mean): --  ABP: 112/49 (19 Feb 2022 06:30) (90/44 - 142/63)  ABP(mean): 71 (19 Feb 2022 06:30) (57 - 92)  RR: 15 (19 Feb 2022 06:30) (0 - 33)  SpO2: 99% (19 Feb 2022 06:30) (94% - 100%)    Mode: AC/ CMV (Assist Control/ Continuous Mandatory Ventilation), RR (machine): 12, TV (machine): 400, FiO2: 21, PEEP: 5, ITime: 1, MAP: 10, PIP: 31  Plateau pressure:   P/F ratio:     02-17 @ 07:01  -  02-18 @ 07:00  --------------------------------------------------------  IN: 2284.4 mL / OUT: 1000 mL / NET: 1284.4 mL    02-18 @ 07:01  - 02-19 @ 06:55  --------------------------------------------------------  IN: 2436.9 mL / OUT: 0 mL / NET: 2436.9 mL      CAPILLARY BLOOD GLUCOSE      POCT Blood Glucose.: 131 mg/dL (19 Feb 2022 05:55)    ECG:    PHYSICAL EXAM:  General: NAD  HEENT: NC/AT; PERRL, clear conjunctiva  Neck: supple  Respiratory: Intubated, overbreathing the vent; Coarse upper airway sounds  Cardiovascular: +S1/S2; RR, tachycardic; no murmurs, gallops or rubs appreciated  Abdomen: soft, NT/ND, no masses noted; PEG site cleaning, no bleeding  Extremities: 2+ BUE edema, 1+ BLE edema  Skin: normal color and turgor; no rash  Neurological: Sedated. Withdrew to painful stimuli, but unable to assess fully    MEDICATIONS:  MEDICATIONS  (STANDING):  atorvastatin 80 milliGRAM(s) Oral at bedtime  carbidopa/levodopa  25/100 2.5 Tablet(s) Oral <User Schedule>  carbidopa/levodopa  25/100 2 Tablet(s) Oral <User Schedule>  chlorhexidine 0.12% Liquid 15 milliLiter(s) Oral Mucosa every 12 hours  chlorhexidine 4% Liquid 1 Application(s) Topical <User Schedule>  chlorhexidine 4% Liquid 1 Application(s) Topical <User Schedule>  dexMEDEtomidine Infusion 0.4 MICROgram(s)/kG/Hr (9.64 mL/Hr) IV Continuous <Continuous>  dextrose 40% Gel 15 Gram(s) Oral once  dextrose 5% + sodium chloride 0.9%. 1000 milliLiter(s) (20 mL/Hr) IV Continuous <Continuous>  dextrose 5%. 1000 milliLiter(s) (50 mL/Hr) IV Continuous <Continuous>  dextrose 5%. 1000 milliLiter(s) (100 mL/Hr) IV Continuous <Continuous>  dextrose 50% Injectable 25 Gram(s) IV Push once  dextrose 50% Injectable 12.5 Gram(s) IV Push once  dextrose 50% Injectable 25 Gram(s) IV Push once  diVALproex Sprinkle 250 milliGRAM(s) Oral three times a day  doxazosin 1 milliGRAM(s) Oral at bedtime  DULoxetine 20 milliGRAM(s) Oral daily  folic acid 1 milliGRAM(s) Oral daily  glucagon  Injectable 1 milliGRAM(s) IntraMuscular once  insulin lispro (ADMELOG) corrective regimen sliding scale   SubCutaneous every 6 hours  levothyroxine Injectable 30 MICROGram(s) IV Push at bedtime  memantine 5 milliGRAM(s) Oral at bedtime  metoprolol tartrate 25 milliGRAM(s) Oral two times a day  midodrine 15 milliGRAM(s) Oral every 8 hours  mirtazapine 7.5 milliGRAM(s) Oral daily  mupirocin 2% Nasal 1 Application(s) Both Nostrils two times a day  Nephro-celeste 1 Tablet(s) Oral daily  norepinephrine Infusion 0.08 MICROgram(s)/kG/Min (14.5 mL/Hr) IV Continuous <Continuous>  pantoprazole Infusion 8 mG/Hr (10 mL/Hr) IV Continuous <Continuous>  piperacillin/tazobactam IVPB.. 3.375 Gram(s) IV Intermittent every 8 hours  propofol Infusion 20 MICROgram(s)/kG/Min (11.6 mL/Hr) IV Continuous <Continuous>  QUEtiapine 50 milliGRAM(s) Oral at bedtime  trihexyphenidyl 2 milliGRAM(s) Oral three times a day  vancomycin    Solution 125 milliGRAM(s) Oral every 6 hours    MEDICATIONS  (PRN):  sodium chloride 0.9% lock flush 10 milliLiter(s) IV Push every 1 hour PRN Pre/post blood products, medications, blood draw, and to maintain line patency      ALLERGIES:  Allergies    adhesives (Rash)  latex (Urticaria)  No Known Drug Allergies    Intolerances        LABS:                        8.8    16.17 )-----------( 119      ( 19 Feb 2022 01:05 )             26.8     02-19    129<L>  |  97  |  38<H>  ----------------------------<  157<H>  3.3<L>   |  19<L>  |  2.38<H>    Ca    7.8<L>      19 Feb 2022 01:06  Phos  3.3     02-19  Mg     1.9     02-19    TPro  5.5<L>  /  Alb  1.7<L>  /  TBili  0.7  /  DBili  x   /  AST  20  /  ALT  <5<L>  /  AlkPhos  67  02-19          RADIOLOGY & ADDITIONAL TESTS: Reviewed.

## 2022-02-19 NOTE — PROGRESS NOTE ADULT - SUBJECTIVE AND OBJECTIVE BOX
Nephrology progress note    Patient is a 71y Male with    Allergies:  adhesives (Rash)  latex (Urticaria)  No Known Drug Allergies    Hospital Medications:   MEDICATIONS  (STANDING):  atorvastatin 80 milliGRAM(s) Oral at bedtime  carbidopa/levodopa  25/100 2.5 Tablet(s) Oral <User Schedule>  carbidopa/levodopa  25/100 2 Tablet(s) Oral <User Schedule>  chlorhexidine 0.12% Liquid 15 milliLiter(s) Oral Mucosa every 12 hours  chlorhexidine 4% Liquid 1 Application(s) Topical <User Schedule>  chlorhexidine 4% Liquid 1 Application(s) Topical <User Schedule>  dexMEDEtomidine Infusion 0.4 MICROgram(s)/kG/Hr (9.64 mL/Hr) IV Continuous <Continuous>  dextrose 40% Gel 15 Gram(s) Oral once  dextrose 5% + sodium chloride 0.9%. 1000 milliLiter(s) (20 mL/Hr) IV Continuous <Continuous>  dextrose 5%. 1000 milliLiter(s) (50 mL/Hr) IV Continuous <Continuous>  dextrose 5%. 1000 milliLiter(s) (100 mL/Hr) IV Continuous <Continuous>  dextrose 50% Injectable 25 Gram(s) IV Push once  dextrose 50% Injectable 12.5 Gram(s) IV Push once  dextrose 50% Injectable 25 Gram(s) IV Push once  diVALproex Sprinkle 250 milliGRAM(s) Oral three times a day  doxazosin 1 milliGRAM(s) Oral at bedtime  DULoxetine 20 milliGRAM(s) Oral daily  folic acid 1 milliGRAM(s) Oral daily  glucagon  Injectable 1 milliGRAM(s) IntraMuscular once  insulin lispro (ADMELOG) corrective regimen sliding scale   SubCutaneous every 6 hours  levothyroxine Injectable 30 MICROGram(s) IV Push at bedtime  memantine 5 milliGRAM(s) Oral at bedtime  metoprolol tartrate 25 milliGRAM(s) Oral two times a day  midodrine 15 milliGRAM(s) Oral every 8 hours  mirtazapine 7.5 milliGRAM(s) Oral daily  mupirocin 2% Nasal 1 Application(s) Both Nostrils two times a day  Nephro-celeste 1 Tablet(s) Oral daily  norepinephrine Infusion 0.08 MICROgram(s)/kG/Min (14.5 mL/Hr) IV Continuous <Continuous>  pantoprazole  Injectable 40 milliGRAM(s) IV Push every 12 hours  piperacillin/tazobactam IVPB.. 3.375 Gram(s) IV Intermittent every 8 hours  propofol Infusion 20 MICROgram(s)/kG/Min (11.6 mL/Hr) IV Continuous <Continuous>  QUEtiapine 50 milliGRAM(s) Oral at bedtime  trihexyphenidyl 2 milliGRAM(s) Oral three times a day  vancomycin    Solution 125 milliGRAM(s) Oral every 6 hours    REVIEW OF SYSTEMS:  CONSTITUTIONAL: No weakness, fevers or chills  EYES/ENT: No visual changes;  No vertigo or throat pain   NECK: No pain or stiffness  RESPIRATORY: No cough, wheezing, hemoptysis; No shortness of breath  CARDIOVASCULAR: No chest pain or palpitations.  GASTROINTESTINAL: No abdominal or epigastric pain. No nausea, vomiting, or hematemesis; No diarrhea or constipation. No melena or hematochezia.  GENITOURINARY: No dysuria, frequency, foamy urine, urinary urgency, incontinence or hematuria  NEUROLOGICAL: No numbness or weakness  SKIN: No itching, burning, rashes, or lesions   VASCULAR: No bilateral lower extremity edema.   All other review of systems is negative unless indicated above.    VITALS:  T(F): 99.1 (02-19-22 @ 08:00), Max: 99.7 (02-18-22 @ 16:00)  HR: 65 (02-19-22 @ 11:15)  BP: --  RR: 14 (02-19-22 @ 11:15)  SpO2: 99% (02-19-22 @ 11:15)  Wt(kg): --    02-17 @ 07:01  -  02-18 @ 07:00  --------------------------------------------------------  IN: 2284.4 mL / OUT: 1000 mL / NET: 1284.4 mL    02-18 @ 07:01  -  02-19 @ 07:00  --------------------------------------------------------  IN: 2436.9 mL / OUT: 0 mL / NET: 2436.9 mL    02-19 @ 07:01  -  02-19 @ 12:02  --------------------------------------------------------  IN: 295.2 mL / OUT: 0 mL / NET: 295.2 mL        PHYSICAL EXAM:  Constitutional: NAD  HEENT: anicteric sclera, oropharynx clear, MMM  Neck: No JVD  Respiratory: CTAB, no wheezes, rales or rhonchi  Cardiovascular: S1, S2, RRR  Gastrointestinal: BS+, soft, NT/ND  Extremities: No cyanosis or clubbing. No peripheral edema  Neurological: A/O x 3, no focal deficits  Psychiatric: Normal mood, normal affect  : No CVA tenderness. No johnson.   Skin: No rashes  Vascular Access:    LABS:  02-19    129<L>  |  97  |  38<H>  ----------------------------<  157<H>  3.3<L>   |  19<L>  |  2.38<H>    Ca    7.8<L>      19 Feb 2022 01:06  Phos  3.3     02-19  Mg     1.9     02-19    TPro  5.5<L>  /  Alb  1.7<L>  /  TBili  0.7  /  DBili      /  AST  20  /  ALT  <5<L>  /  AlkPhos  67  02-19                          8.8    16.17 )-----------( 119      ( 19 Feb 2022 01:05 )             26.8       Urine Studies:    Osmolality, Random Urine: 344 mos/kg (02-15 @ 06:14)    RADIOLOGY & ADDITIONAL STUDIES:   Nephrology Progress Note    Patient is a 71y male seen in [facility], intubated and sedated.     Allergies:  adhesives (Rash)  latex (Urticaria)  No Known Drug Allergies    Hospital Medications:   MEDICATIONS  (STANDING):  atorvastatin 80 milliGRAM(s) Oral at bedtime  carbidopa/levodopa  25/100 2.5 Tablet(s) Oral <User Schedule>  carbidopa/levodopa  25/100 2 Tablet(s) Oral <User Schedule>  chlorhexidine 0.12% Liquid 15 milliLiter(s) Oral Mucosa every 12 hours  chlorhexidine 4% Liquid 1 Application(s) Topical <User Schedule>  chlorhexidine 4% Liquid 1 Application(s) Topical <User Schedule>  dexMEDEtomidine Infusion 0.4 MICROgram(s)/kG/Hr (9.64 mL/Hr) IV Continuous <Continuous>  dextrose 40% Gel 15 Gram(s) Oral once  dextrose 5% + sodium chloride 0.9%. 1000 milliLiter(s) (20 mL/Hr) IV Continuous <Continuous>  dextrose 5%. 1000 milliLiter(s) (50 mL/Hr) IV Continuous <Continuous>  dextrose 5%. 1000 milliLiter(s) (100 mL/Hr) IV Continuous <Continuous>  dextrose 50% Injectable 25 Gram(s) IV Push once  dextrose 50% Injectable 12.5 Gram(s) IV Push once  dextrose 50% Injectable 25 Gram(s) IV Push once  diVALproex Sprinkle 250 milliGRAM(s) Oral three times a day  doxazosin 1 milliGRAM(s) Oral at bedtime  DULoxetine 20 milliGRAM(s) Oral daily  folic acid 1 milliGRAM(s) Oral daily  glucagon  Injectable 1 milliGRAM(s) IntraMuscular once  insulin lispro (ADMELOG) corrective regimen sliding scale   SubCutaneous every 6 hours  levothyroxine Injectable 30 MICROGram(s) IV Push at bedtime  memantine 5 milliGRAM(s) Oral at bedtime  metoprolol tartrate 25 milliGRAM(s) Oral two times a day  midodrine 15 milliGRAM(s) Oral every 8 hours  mirtazapine 7.5 milliGRAM(s) Oral daily  mupirocin 2% Nasal 1 Application(s) Both Nostrils two times a day  Nephro-celeste 1 Tablet(s) Oral daily  norepinephrine Infusion 0.08 MICROgram(s)/kG/Min (14.5 mL/Hr) IV Continuous <Continuous>  pantoprazole  Injectable 40 milliGRAM(s) IV Push every 12 hours  piperacillin/tazobactam IVPB.. 3.375 Gram(s) IV Intermittent every 8 hours  propofol Infusion 20 MICROgram(s)/kG/Min (11.6 mL/Hr) IV Continuous <Continuous>  QUEtiapine 50 milliGRAM(s) Oral at bedtime  trihexyphenidyl 2 milliGRAM(s) Oral three times a day  vancomycin    Solution 125 milliGRAM(s) Oral every 6 hours      VITALS:  T(F): 99.1 (02-19-22 @ 08:00), Max: 99.7 (02-18-22 @ 16:00)  HR: 65 (02-19-22 @ 11:15)  BP: --  RR: 14 (02-19-22 @ 11:15)  SpO2: 99% (02-19-22 @ 11:15)      02-17 @ 07:01  -  02-18 @ 07:00  --------------------------------------------------------  IN: 2284.4 mL / OUT: 1000 mL / NET: 1284.4 mL    02-18 @ 07:01  -  02-19 @ 07:00  --------------------------------------------------------  IN: 2436.9 mL / OUT: 0 mL / NET: 2436.9 mL    02-19 @ 07:01  -  02-19 @ 12:02  --------------------------------------------------------  IN: 295.2 mL / OUT: 0 mL / NET: 295.2 mL        PHYSICAL EXAM:  Constitutional: intubated / sedated  Neck:  No JVD  Respiratory: Course   Cardiovascular: S1 and S2  Gastrointestinal: BS+, soft, NT/ND  Extremities: No peripheral edema  Neurological:    : No Javier  Skin: No rashes  Access: perm cath and avf    LABS:  02-19    129<L>  |  97  |  38<H>  ----------------------------<  157<H>  3.3<L>   |  19<L>  |  2.38<H>    Ca    7.8<L>      19 Feb 2022 01:06  Phos  3.3     02-19  Mg     1.9     02-19    TPro  5.5<L>  /  Alb  1.7<L>  /  TBili  0.7  /  DBili      /  AST  20  /  ALT  <5<L>  /  AlkPhos  67  02-19                          8.8    16.17 )-----------( 119      ( 19 Feb 2022 01:05 )             26.8       Urine Studies:    Osmolality, Random Urine: 344 mos/kg (02-15 @ 06:14)

## 2022-02-19 NOTE — PROGRESS NOTE ADULT - ASSESSMENT
Assessment:  70yo M w/ PMHx of HTN,  CAD (s/p CABG 2019), CKD (unknown stage), DM2, Parkinson's Disease, depression, dementia  who presented on 10/21/21 p/w LE swelling, was found to be in new onset acute HF exacerbation and NSTEMI s/p hep gtt, s/p MICU course for hemorrhagic shock 2/2 b/l RP bleed s/p IR embolization l4 and l5 lumbar artery on 10/28. Hospital course c/b COVID & new ESRD-HD as of this admission, s/p PermCath and AVF (maturing), new PEG. Further c/b acute blood loss anemia 2/2 UGIB 2/2 duodenal ulcer w/ active bleeding vessel s/p endoscopy on 2/16 with epi and cauterization, remains intubated post procedure and admitted to MICU for monitoring and management.    Neuro   - Wean propofol, and switch to Precedex in prep for extubation  - Avoid haldo/zyprexa for agitation due to hx of PD; may use Ativan 0.25 prn if agitated when weaning sedation    #Parkinson disease   - c/w Sinemet 25/100 2.5 TAB 6 am and 2pm  - c/w Sinemet 25/100 2 TAB 10pm   - c/w Artane 2mg TID     #Depression   - c/w Cymbalta 20mg qd    - c/w Divalproex 250mg tid  - c/w Seroquel 50mg qhs  - c/w Remeron 7.5mg qd    #Dementia   - c/w Namenda 5mg qhs    CV  #Shock state: possible sepsis.   - Wean Levo as tolerated;   - Add midodrine standing to assist weaning;   - Treat sepsis empirically  - IVC 2.3, low albumin - give albumin 5% 250cc x 1    #Heart failure & CAD s/p CABG 2019  - c/w metoprolol 5mg IV q6h  - cont to hold Lasix iso GIB  - c/w atorvastatin 80mg qhs    PULM  - Recent COVID infection, PCR still positive, no isolation  - Intubated prior to endoscopy, remained intubated post procedure for close monitoring in MICU   - Alkalotic on vent; decreased TV   - PS trial when weaning sedation, and trial of extubation if able to tolerate    GI  #Acute blood loss anemia 2/2 UGIB 2/2 duodenal ulcer w/ active bleeding vessel   - S/p Endoscopy w/duodenal ulcer s/p tx w/ epi and cautery on 2/16  - If re-bleeds will require IR intervention  - Transition from PPI ggt to IV BID PPI on 2/19 per GI  - C/w TF as tolerated  - H&H stable; Trend CBC daily and keep active T&S     #C-diff  Positive on 2/13  - C/w vanco 125mg q6h via PEG x 10 days      #ESRD on HD via permacath (placed 2/10/22 R IJ)   - No urine output currently  - HD per nephro  - C/w nephro nina qd   - Replete lytes as appropriate  - c/w Sensipar 60mg qd for hypercalcemia    #BPH   - C/w Cardura    ENDO  #DMT2  - FS q 6 hrs with ISS q 6 hrs while on TF  - ENDO: Suggest DC on Tradjenta 5mg daily, discontinue prior hypoglycemic agents/insulin, endo FU 4 weeks.    #Hypothyroidism.   - Increased IV levothyroxine to 30 mg due to subclinical hypothyroidism and hypotension    HEME   # Acute blood loss 2/2 UGIB 2/2 duodenal ulcer   - Hold AC or DVT ppx  - H&H stable, trend CBC qd and active T&S    #Hx of b/l RP bleed during this admission   -s/p Abdominal Angiography and Embolization on 10/29/2021 by Interventional radiology of l4and l5 lumbar artery     ID  # Sepsis   - Pending Bcx, unclear source currently  - Empirical coverage with Zosyn  - MRSA swab neg (MSSA positive, started on mupirocin nasal ointment for 10 days)  - C/w vancomycin 125 mg q 6 hrs via PEG for C. diff    PPX  - GI ppx ; PPI gtt --> IV BID on 2/19  - DVT ppx ; compression devices    Ethics  - DNR 70yo M w/ PMHx of HTN,  CAD (s/p CABG 2019), CKD (unknown stage), DM2, Parkinson's Disease, depression, dementia  who presented on 10/21/21 p/w LE swelling, was found to be in new onset acute HF exacerbation and NSTEMI s/p hep gtt, s/p MICU course for hemorrhagic shock 2/2 b/l RP bleed s/p IR embolization l4 and l5 lumbar artery on 10/28. Hospital course c/b COVID & new ESRD-HD as of this admission, s/p PermCath and AVF (maturing), new PEG. Further c/b acute blood loss anemia 2/2 UGIB 2/2 duodenal ulcer w/ active bleeding vessel s/p endoscopy on 2/16 with epi and cauterization, remains intubated post procedure and admitted to MICU for monitoring and management.    Neuro   - Wean propofol, and switch to Precedex in prep for extubation  - Avoid haldo/zyprexa for agitation due to hx of PD; may use Ativan 0.25 prn if agitated when weaning sedation    #Parkinson disease   - c/w Sinemet 25/100 2.5 TAB 6 am and 2pm  - c/w Sinemet 25/100 2 TAB 10pm   - c/w Artane 2mg TID     #Depression   - c/w Cymbalta 20mg qd    - c/w Divalproex 250mg tid  - c/w Seroquel 50mg qhs  - c/w Remeron 7.5mg qd    #Dementia   - c/w Namenda 5mg qhs    CV  #Shock state: possible sepsis.   - Wean Levo as tolerated;   - Midodrine dose held overnight due to bradycardia   - Treat sepsis empirically  - IVC 2.3, low albumin - give albumin 5% 250cc x 1    #Heart failure & CAD s/p CABG 2019  - c/w metoprolol 5mg IV q6h  - cont to hold Lasix iso GIB  - c/w atorvastatin 80mg qhs    PULM  - Recent COVID infection, PCR still positive, no isolation  - Intubated prior to endoscopy, remained intubated post procedure for close monitoring in MICU   - Alkalotic on vent; decreased TV   - PS trial when weaning sedation, and trial of extubation if able to tolerate    GI  #Acute blood loss anemia 2/2 UGIB 2/2 duodenal ulcer w/ active bleeding vessel   - S/p Endoscopy w/duodenal ulcer s/p tx w/ epi and cautery on 2/16  - If re-bleeds will require IR intervention  - Transition from PPI ggt to IV BID PPI on 2/19 per GI  - C/w TF as tolerated  - H&H stable; Trend CBC daily and keep active T&S     #C-diff  Positive on 2/13  - C/w vanco 125mg q6h via PEG x 10 days      #ESRD on HD via permacath (placed 2/10/22 R IJ)   - No urine output currently  - HD per nephro  - C/w nephro nina qd   - Replete lytes as appropriate  - c/w Sensipar 60mg qd for hypercalcemia    #BPH   - C/w doxazosin     ENDO  #DMT2  - FS q 6 hrs with ISS q 6 hrs while on TF  - ENDO: Suggest DC on Tradjenta 5mg daily, discontinue prior hypoglycemic agents/insulin, endo FU 4 weeks.    #Hypothyroidism.   - Increased IV levothyroxine to 30 mg due to subclinical hypothyroidism and hypotension    HEME   # Acute blood loss 2/2 UGIB 2/2 duodenal ulcer   - Hold AC or DVT ppx  - H&H stable, trend CBC qd and active T&S    #Hx of b/l RP bleed during this admission   -s/p Abdominal Angiography and Embolization on 10/29/2021 by Interventional radiology of l4and l5 lumbar artery     ID  # Sepsis   - 2/17 Bcx, NGTD   - Empiric coverage with Zosyn  - MRSA swab neg (MSSA positive, started on mupirocin nasal ointment for 10 days)  - C/w vancomycin 125 mg q 6 hrs via PEG for C. diff    PPX  - GI ppx ; PPI gtt --> IV BID on 2/19  - DVT ppx ; compression devices    Ethics  - DNR  - Will discuss if patient fails trial of extubation  70yo M w/ PMHx of HTN,  CAD (s/p CABG 2019), CKD (unknown stage), DM2, Parkinson's Disease, depression, dementia  who presented on 10/21/21 p/w LE swelling, was found to be in new onset acute HF exacerbation and NSTEMI s/p hep gtt, s/p MICU course for hemorrhagic shock 2/2 b/l RP bleed s/p IR embolization l4 and l5 lumbar artery on 10/28. Hospital course c/b COVID & new ESRD-HD as of this admission, s/p PermCath and AVF (maturing), new PEG. Further c/b acute blood loss anemia 2/2 UGIB 2/2 duodenal ulcer w/ active bleeding vessel s/p endoscopy on 2/16 with epi and cauterization, remains intubated post procedure and admitted to MICU for monitoring and management.    Neuro   - Wean propofol, and switch to Precedex in prep for extubation  - Avoid haldo/zyprexa for agitation due to hx of PD; may use Ativan 0.25 prn if agitated when weaning sedation    #Parkinson disease   - c/w Sinemet 25/100 2.5 TAB 6 am and 2pm  - c/w Sinemet 25/100 2 TAB 10pm   - c/w Artane 2mg TID     #Depression   - c/w Cymbalta 20mg qd    - c/w Divalproex 250mg tid  - c/w Seroquel 50mg qhs  - c/w Remeron 7.5mg qd    #Dementia   - c/w Namenda 5mg qhs    CV  #Shock state: possible sepsis.   - Wean Levo as tolerated;   - Midodrine dose held overnight due to bradycardia   - C/w midodrine 15mg q8 hours with hold for bradycardia   - Treat sepsis empirically    #Heart failure & CAD s/p CABG 2019  - Hold metoprolol 25mg given hypotension and presser requirements   - cont to hold Lasix iso GIB  - c/w atorvastatin 80mg qhs    PULM  - Recent COVID infection, PCR still positive, no isolation  - Intubated prior to endoscopy, plan to wean and extubate after dialysis   - Alkalotic on vent; decreased TV   - PS trial when weaning sedation, and trial of extubation if able to tolerate    GI  #Acute blood loss anemia 2/2 UGIB 2/2 duodenal ulcer w/ active bleeding vessel   - S/p Endoscopy w/duodenal ulcer s/p tx w/ epi and cautery on 2/16  - If re-bleeds will require IR intervention  - Hemoglobin downtrending from yesterday   - Transitioned from PPI ggt to IV BID PPI   - C/w TF as tolerated  - H&H stable; Trend CBC daily and keep active T&S     #C-diff  Positive on 2/13  - C/w vanco 125mg q6h via PEG x 10 days      #ESRD on HD via permacath (placed 2/10/22 R JOSEPH)   - No urine output currently  - HD per nephro  - C/w nephro nina qd   - Replete lytes as appropriate  - c/w Sensipar 60mg qd for hypercalcemia    #BPH   - C/w doxazosin     ENDO  #DMT2  - FS q 6 hrs with ISS q 6 hrs while on TF  - ENDO: Suggest DC on Tradjenta 5mg daily, discontinue prior hypoglycemic agents/insulin, endo FU 4 weeks.    #Hypothyroidism.   - continue IV levothyroxine to 30 mg due to subclinical hypothyroidism and hypotension    HEME   # Acute blood loss 2/2 UGIB 2/2 duodenal ulcer   - Hold AC or DVT ppx  - H&H stable, trend CBC qd and active T&S    #Hx of b/l RP bleed during this admission   -s/p Abdominal Angiography and Embolization on 10/29/2021 by Interventional radiology of l4and l5 lumbar artery     ID  # Sepsis   - 2/17 Bcx, NGTD   - Empiric coverage with Zosyn  - MRSA swab neg (MSSA positive, started on mupirocin nasal ointment for 10 days)  - C/w vancomycin 125 mg q 6 hrs via PEG for C. diff    PPX  - GI ppx ; PPI gtt --> IV BID on 2/19  - DVT ppx ; compression devices    Ethics  - DNR/DNI  - Discussed with family and they want no reintubation if the patient goes into respiratory failure after extubation they would want a comfort care approach

## 2022-02-19 NOTE — PROGRESS NOTE ADULT - ASSESSMENT
Assessment  DMT2: 71y Male with DM T2 with hyperglycemia, A1C 6.4%, was on insulin at home, now on insulin coverage only, blood sugars improving no hypoglycemias,  remains intubated and NPO in the icu.  Hypothyroidism: Patient has no history thyroid disease, was not on any thyroid supplements, subclinical with low-normal FT4 and lethargy, on synthroid 12.5 mcg IV daily, repeat   TFTs show subclinical state, increased to synthroid 20mcg IV.  Hypercalcemia: Started on Sensipar 60mg daily, Ca improving.  CHF: on medications, stable, monitored.  HTN: Controlled,  on antihypertensive medications.  Parkinsons: on meds, monitored.  CKD: Monitor labs/BMP

## 2022-02-19 NOTE — PROGRESS NOTE ADULT - ASSESSMENT
71 M w volume overload, GI consulted for drop in hgb    1. GI Bleeding  -s/p endoscopy 2/16 which showed spurting duodenal ulcer; ulcer injected with epi for hemostasis, cautery and hemospray used.  -hgb 9.7 today  -high dose PPI  -may resume tube feeds  -if bleeding occurs again would need embolization by IR  -monitor for GI bleeding  -trend CBCs  -family considering more palliative goals of care      2. ABBY on CKD per renal  -on HD via permacath per renal, midodrine during dialysis  -AVF not functional   -s/p tunnelled HD catheter insertion by IR 2/10    3. RP bleed  -s/p Abdominal Angiography and Embolization on 10/29/2021 by Interventional radiology of l4and l5 lumbar artery     3. C-diff  -on vanco     Attending supervision statement: I have personally seen and examined the patient. I fully participated in the care of this patient. I have made amendments to the documentation where necessary, and agree with the history, physical exam, and plan as outlined by the ACP.      [facility]  Gastroenterology and Hepatology  [address] [place], [place]  Office: [phone]  Cell: [phone]

## 2022-02-19 NOTE — PROGRESS NOTE ADULT - ASSESSMENT
70yo M w/ PMHx of CAD (s/p CABG 2019), CKD (unknown stage), DM2, Parkinson's Disease, HTN, depression presents with new onset acute heart failure exacerbation, NSTEMI, started on hep gtt, developed bl RP bleed, acute anemia. sp MICU course for hemorrhagic shock, 10/28 sp IR embolization l4 and l5 lumbar artery. sp permacath and AVF.    # chronic systolic and diastolic heart failure  # ESRD, new HD, AVF not matured, sp permacath  # Atrial flutter  # Parkinson's disease   # delirium  # CAD (coronary artery disease)  # HTN  # DM2 (diabetes mellitus, type 2)  # FTT sp PEG, dislodged and replaced  # C diff colitis  # GI bleed  # acute resp failure    UGIB likely due to GDA territory ulcer   IR embolization if re bleeds  monitor h/h  PPI gtt  NPO  PO vanco for c diff  empiric zosyn  titrate pressors as tolerated  appreciate ICU care    DVT ppx hold AC    DNR/DNI    [facility]  [phone]

## 2022-02-19 NOTE — PROGRESS NOTE ADULT - SUBJECTIVE AND OBJECTIVE BOX
Patient is a 71y old  Male who presents with a chief complaint of leg swelling (19 Feb 2022 12:01)      SUBJECTIVE / OVERNIGHT EVENTS:    Patient seen and examined. intubated. sedated. cannot provide ros.      Vital Signs Last 24 Hrs  T(C): 36.6 (19 Feb 2022 12:00), Max: 37.6 (18 Feb 2022 16:00)  T(F): 97.9 (19 Feb 2022 12:00), Max: 99.7 (18 Feb 2022 16:00)  HR: 71 (19 Feb 2022 13:00) (57 - 121)  BP: --  BP(mean): --  RR: 16 (19 Feb 2022 13:00) (12 - 33)  SpO2: 100% (19 Feb 2022 13:00) (94% - 100%)  I&O's Summary    18 Feb 2022 07:01  -  19 Feb 2022 07:00  --------------------------------------------------------  IN: 2436.9 mL / OUT: 0 mL / NET: 2436.9 mL    19 Feb 2022 07:01  -  19 Feb 2022 14:05  --------------------------------------------------------  IN: 469.8 mL / OUT: 0 mL / NET: 469.8 mL        PE:  PHYSICAL EXAM:  GENERAL: NAD, intubated, warmer on   HEAD:  Atraumatic, Normocephalic  CHEST/LUNG: Coarse BS  HEART: Regular rate and rhythm; no murmur  ABDOMEN: Soft, Nontender, distended; Bowel sounds present, PEG   EXTREMITIES:  2+ Peripheral Pulses, UE bl edema  NEURO: sedated    LABS:                        8.8    16.17 )-----------( 119      ( 19 Feb 2022 01:05 )             26.8     02-19    129<L>  |  97  |  38<H>  ----------------------------<  157<H>  3.3<L>   |  19<L>  |  2.38<H>    Ca    7.8<L>      19 Feb 2022 01:06  Phos  3.3     02-19  Mg     1.9     02-19    TPro  5.5<L>  /  Alb  1.7<L>  /  TBili  0.7  /  DBili  x   /  AST  20  /  ALT  <5<L>  /  AlkPhos  67  02-19      CAPILLARY BLOOD GLUCOSE      POCT Blood Glucose.: 137 mg/dL (19 Feb 2022 12:14)  POCT Blood Glucose.: 131 mg/dL (19 Feb 2022 05:55)  POCT Blood Glucose.: 141 mg/dL (18 Feb 2022 18:21)            RADIOLOGY & ADDITIONAL TESTS:    Imaging Personally Reviewed:  [x] YES  [ ] NO    Consultant(s) Notes Reviewed:  [x] YES  [ ] NO    MEDICATIONS  (STANDING):  atorvastatin 80 milliGRAM(s) Oral at bedtime  carbidopa/levodopa  25/100 2.5 Tablet(s) Oral <User Schedule>  carbidopa/levodopa  25/100 2 Tablet(s) Oral <User Schedule>  chlorhexidine 0.12% Liquid 15 milliLiter(s) Oral Mucosa every 12 hours  chlorhexidine 4% Liquid 1 Application(s) Topical <User Schedule>  chlorhexidine 4% Liquid 1 Application(s) Topical <User Schedule>  dexMEDEtomidine Infusion 0.4 MICROgram(s)/kG/Hr (9.64 mL/Hr) IV Continuous <Continuous>  dextrose 40% Gel 15 Gram(s) Oral once  dextrose 5% + sodium chloride 0.9%. 1000 milliLiter(s) (20 mL/Hr) IV Continuous <Continuous>  dextrose 5%. 1000 milliLiter(s) (50 mL/Hr) IV Continuous <Continuous>  dextrose 5%. 1000 milliLiter(s) (100 mL/Hr) IV Continuous <Continuous>  dextrose 50% Injectable 25 Gram(s) IV Push once  dextrose 50% Injectable 12.5 Gram(s) IV Push once  dextrose 50% Injectable 25 Gram(s) IV Push once  diVALproex Sprinkle 250 milliGRAM(s) Oral three times a day  doxazosin 1 milliGRAM(s) Oral at bedtime  DULoxetine 20 milliGRAM(s) Oral daily  folic acid 1 milliGRAM(s) Oral daily  glucagon  Injectable 1 milliGRAM(s) IntraMuscular once  insulin lispro (ADMELOG) corrective regimen sliding scale   SubCutaneous every 6 hours  levothyroxine Injectable 30 MICROGram(s) IV Push at bedtime  memantine 5 milliGRAM(s) Oral at bedtime  metoprolol tartrate 25 milliGRAM(s) Oral two times a day  midodrine 15 milliGRAM(s) Oral every 8 hours  mirtazapine 7.5 milliGRAM(s) Oral daily  mupirocin 2% Nasal 1 Application(s) Both Nostrils two times a day  Nephro-celeste 1 Tablet(s) Oral daily  norepinephrine Infusion 0.08 MICROgram(s)/kG/Min (14.5 mL/Hr) IV Continuous <Continuous>  pantoprazole  Injectable 40 milliGRAM(s) IV Push every 12 hours  piperacillin/tazobactam IVPB.. 3.375 Gram(s) IV Intermittent every 8 hours  propofol Infusion 20 MICROgram(s)/kG/Min (11.6 mL/Hr) IV Continuous <Continuous>  QUEtiapine 50 milliGRAM(s) Oral at bedtime  trihexyphenidyl 2 milliGRAM(s) Oral three times a day  vancomycin    Solution 125 milliGRAM(s) Oral every 6 hours    MEDICATIONS  (PRN):  sodium chloride 0.9% lock flush 10 milliLiter(s) IV Push every 1 hour PRN Pre/post blood products, medications, blood draw, and to maintain line patency      Care Discussed with Consultants/Other Providers [x] YES  [ ] NO    HEALTH ISSUES - PROBLEM Dx:  Acute heart failure    Atrial flutter    DM2 (diabetes mellitus, type 2)    CAD (coronary artery disease)    Parkinsons disease    Acute on chronic renal failure    NSTEMI (non-ST elevation myocardial infarction)    Hypertension    Acute kidney injury superimposed on CKD    Anemia    Hypothyroidism    Delirium    Advanced care planning/counseling discussion    Palliative care status    Palliative care encounter    Pain    Stage 5 chronic kidney disease not on chronic dialysis    Hypercalcemia    Preop cardiovascular exam

## 2022-02-19 NOTE — PROGRESS NOTE ADULT - ASSESSMENT
1M w/ HTN, DM2, CAD-CABG, Parkinson's disease, and advanced CKD, 10/21/21 p/w LE swelling, c/b COVID; now newly ESRD-HD as of this admission    (1)Renal - ESRD-HD last HD Thursday, 2/17 1 liter removed  (2)Anemia- significant drop in H/H;    (3)Failure to thrive and confusion     RECOMMEND  (1)SP EGD and remains intubated after the procedure and recovering in the MICU sp cautery.  Protonix IVP q12hrs  If he bleeds again then will need IR   (2)HD today  (3) wean pressors as able  (4) PO Vanco for C gary Guardado NP  [facility]   [phone]

## 2022-02-20 NOTE — PROGRESS NOTE ADULT - SUBJECTIVE AND OBJECTIVE BOX
Chief complaint  Patient is a 71y old  Male who presents with a chief complaint of leg swelling (20 Feb 2022 13:23)   Review of systems  Patient remains intubated in the icu, no hypoglycemic episodes.    Labs and Fingersticks  CAPILLARY BLOOD GLUCOSE      POCT Blood Glucose.: 126 mg/dL (20 Feb 2022 12:15)  POCT Blood Glucose.: 132 mg/dL (20 Feb 2022 05:45)  POCT Blood Glucose.: 123 mg/dL (19 Feb 2022 18:34)      Anion Gap, Serum: 11 (02-20 @ 00:46)  Anion Gap, Serum: 13 (02-19 @ 01:06)      Calcium, Total Serum: 7.8 *L* (02-20 @ 00:46)  Calcium, Total Serum: 7.8 *L* (02-19 @ 01:06)  Albumin, Serum: 1.4 *L* (02-20 @ 00:46)  Albumin, Serum: 1.7 *L* (02-19 @ 01:06)    Alanine Aminotransferase (ALT/SGPT): <5 *L* (02-20 @ 00:46)  Alanine Aminotransferase (ALT/SGPT): <5 *L* (02-19 @ 01:06)  Alkaline Phosphatase, Serum: 81 (02-20 @ 00:46)  Alkaline Phosphatase, Serum: 67 (02-19 @ 01:06)  Aspartate Aminotransferase (AST/SGOT): 17 (02-20 @ 00:46)  Aspartate Aminotransferase (AST/SGOT): 20 (02-19 @ 01:06)        02-20    132<L>  |  99  |  21  ----------------------------<  149<H>  3.6   |  22  |  1.64<H>    Ca    7.8<L>      20 Feb 2022 00:46  Phos  2.7     02-20  Mg     1.7     02-20    TPro  5.6<L>  /  Alb  1.4<L>  /  TBili  0.6  /  DBili  x   /  AST  17  /  ALT  <5<L>  /  AlkPhos  81  02-20                        9.0    14.62 )-----------( 119      ( 20 Feb 2022 00:46 )             27.3     Medications  MEDICATIONS  (STANDING):  albumin human 25% IVPB 50 milliLiter(s) IV Intermittent every 6 hours  atorvastatin 80 milliGRAM(s) Oral at bedtime  carbidopa/levodopa  25/100 2.5 Tablet(s) Oral <User Schedule>  carbidopa/levodopa  25/100 2 Tablet(s) Oral <User Schedule>  chlorhexidine 0.12% Liquid 15 milliLiter(s) Oral Mucosa every 12 hours  chlorhexidine 4% Liquid 1 Application(s) Topical <User Schedule>  chlorhexidine 4% Liquid 1 Application(s) Topical <User Schedule>  dexMEDEtomidine Infusion 0.4 MICROgram(s)/kG/Hr (9.64 mL/Hr) IV Continuous <Continuous>  dextrose 40% Gel 15 Gram(s) Oral once  dextrose 5% + sodium chloride 0.9%. 1000 milliLiter(s) (20 mL/Hr) IV Continuous <Continuous>  dextrose 5%. 1000 milliLiter(s) (100 mL/Hr) IV Continuous <Continuous>  dextrose 5%. 1000 milliLiter(s) (50 mL/Hr) IV Continuous <Continuous>  dextrose 50% Injectable 25 Gram(s) IV Push once  dextrose 50% Injectable 12.5 Gram(s) IV Push once  dextrose 50% Injectable 25 Gram(s) IV Push once  diVALproex Sprinkle 250 milliGRAM(s) Oral three times a day  doxazosin 1 milliGRAM(s) Oral at bedtime  DULoxetine 20 milliGRAM(s) Oral daily  folic acid 1 milliGRAM(s) Oral daily  glucagon  Injectable 1 milliGRAM(s) IntraMuscular once  insulin lispro (ADMELOG) corrective regimen sliding scale   SubCutaneous every 6 hours  levothyroxine Injectable 30 MICROGram(s) IV Push at bedtime  memantine 5 milliGRAM(s) Oral at bedtime  midodrine 15 milliGRAM(s) Oral every 8 hours  mirtazapine 7.5 milliGRAM(s) Oral daily  mupirocin 2% Nasal 1 Application(s) Both Nostrils two times a day  Nephro-celeste 1 Tablet(s) Oral daily  norepinephrine Infusion 0.08 MICROgram(s)/kG/Min (14.5 mL/Hr) IV Continuous <Continuous>  pantoprazole  Injectable 40 milliGRAM(s) IV Push every 12 hours  piperacillin/tazobactam IVPB.. 3.375 Gram(s) IV Intermittent every 8 hours  QUEtiapine 50 milliGRAM(s) Oral at bedtime  trihexyphenidyl 2 milliGRAM(s) Oral three times a day  vancomycin    Solution 125 milliGRAM(s) Oral every 6 hours      Physical Exam  General: Patient comfortable in bed  Vital Signs Last 12 Hrs  T(F): 99.3 (02-20-22 @ 12:00), Max: 99.3 (02-20-22 @ 12:00)  HR: 113 (02-20-22 @ 15:28) (61 - 123)  BP: --  BP(mean): --  RR: 33 (02-20-22 @ 14:45) (12 - 39)  SpO2: 100% (02-20-22 @ 15:28) (97% - 100%)

## 2022-02-20 NOTE — PROGRESS NOTE ADULT - SUBJECTIVE AND OBJECTIVE BOX
Nephrology Progress Note    Patient is a 71y in MICU sedated. Family at bedside.    Allergies:  adhesives (Rash)  latex (Urticaria)  No Known Drug Allergies    Hospital Medications:   MEDICATIONS  (STANDING):  albumin human 25% IVPB 50 milliLiter(s) IV Intermittent every 6 hours  atorvastatin 80 milliGRAM(s) Oral at bedtime  carbidopa/levodopa  25/100 2.5 Tablet(s) Oral <User Schedule>  carbidopa/levodopa  25/100 2 Tablet(s) Oral <User Schedule>  chlorhexidine 0.12% Liquid 15 milliLiter(s) Oral Mucosa every 12 hours  chlorhexidine 4% Liquid 1 Application(s) Topical <User Schedule>  chlorhexidine 4% Liquid 1 Application(s) Topical <User Schedule>  dexMEDEtomidine Infusion 0.4 MICROgram(s)/kG/Hr (9.64 mL/Hr) IV Continuous <Continuous>  dextrose 40% Gel 15 Gram(s) Oral once  dextrose 5% + sodium chloride 0.9%. 1000 milliLiter(s) (20 mL/Hr) IV Continuous <Continuous>  dextrose 5%. 1000 milliLiter(s) (50 mL/Hr) IV Continuous <Continuous>  dextrose 5%. 1000 milliLiter(s) (100 mL/Hr) IV Continuous <Continuous>  dextrose 50% Injectable 25 Gram(s) IV Push once  dextrose 50% Injectable 12.5 Gram(s) IV Push once  dextrose 50% Injectable 25 Gram(s) IV Push once  diVALproex Sprinkle 250 milliGRAM(s) Oral three times a day  doxazosin 1 milliGRAM(s) Oral at bedtime  DULoxetine 20 milliGRAM(s) Oral daily  folic acid 1 milliGRAM(s) Oral daily  glucagon  Injectable 1 milliGRAM(s) IntraMuscular once  insulin lispro (ADMELOG) corrective regimen sliding scale   SubCutaneous every 6 hours  levothyroxine Injectable 30 MICROGram(s) IV Push at bedtime  memantine 5 milliGRAM(s) Oral at bedtime  midodrine 15 milliGRAM(s) Oral every 8 hours  mirtazapine 7.5 milliGRAM(s) Oral daily  mupirocin 2% Nasal 1 Application(s) Both Nostrils two times a day  Nephro-celeste 1 Tablet(s) Oral daily  norepinephrine Infusion 0.08 MICROgram(s)/kG/Min (14.5 mL/Hr) IV Continuous <Continuous>  pantoprazole  Injectable 40 milliGRAM(s) IV Push every 12 hours  piperacillin/tazobactam IVPB.. 3.375 Gram(s) IV Intermittent every 8 hours  propofol Infusion 20 MICROgram(s)/kG/Min (11.6 mL/Hr) IV Continuous <Continuous>  QUEtiapine 50 milliGRAM(s) Oral at bedtime  trihexyphenidyl 2 milliGRAM(s) Oral three times a day  vancomycin    Solution 125 milliGRAM(s) Oral every 6 hours        VITALS:  T(F): 99.3 (02-20-22 @ 12:00), Max: 99.3 (02-20-22 @ 12:00)  HR: 106 (02-20-22 @ 12:30)  BP: --  RR: 25 (02-20-22 @ 12:30)  SpO2: 99% (02-20-22 @ 12:30)      02-18 @ 07:01  -  02-19 @ 07:00  --------------------------------------------------------  IN: 2436.9 mL / OUT: 0 mL / NET: 2436.9 mL    02-19 @ 07:01  -  02-20 @ 07:00  --------------------------------------------------------  IN: 1906.5 mL / OUT: 1150 mL / NET: 756.5 mL    02-20 @ 07:01  -  02-20 @ 13:22  --------------------------------------------------------  IN: 301.8 mL / OUT: 0 mL / NET: 301.8 mL        PHYSICAL EXAM:  Constitutional: intubated / sedated  Neck:  No JVD  Respiratory: Course   Cardiovascular: S1 and S2  Gastrointestinal: BS+, soft, NT/ND  Extremities: No peripheral edema  Neurological:    : No Javier  Skin: No rashes  Access: perm cath and avf    LABS:  02-20    132<L>  |  99  |  21  ----------------------------<  149<H>  3.6   |  22  |  1.64<H>    Ca    7.8<L>      20 Feb 2022 00:46  Phos  2.7     02-20  Mg     1.7     02-20    TPro  5.6<L>  /  Alb  1.4<L>  /  TBili  0.6  /  DBili      /  AST  17  /  ALT  <5<L>  /  AlkPhos  81  02-20                          9.0    14.62 )-----------( 119      ( 20 Feb 2022 00:46 )             27.3       Urine Studies:    Osmolality, Random Urine: 344 mos/kg (02-15 @ 06:14)

## 2022-02-20 NOTE — PROGRESS NOTE ADULT - ASSESSMENT
72yo M w/ PMHx of CAD (s/p CABG 2019), CKD (unknown stage), DM2, Parkinson's Disease, HTN, depression presents with new onset acute heart failure exacerbation, NSTEMI, started on hep gtt, developed bl RP bleed, acute anemia. sp MICU course for hemorrhagic shock, 10/28 sp IR embolization l4 and l5 lumbar artery. sp permacath and AVF.    # chronic systolic and diastolic heart failure  # ESRD, new HD, AVF not matured, sp permacath  # Atrial flutter  # Parkinson's disease   # delirium  # CAD (coronary artery disease)  # HTN  # DM2 (diabetes mellitus, type 2)  # FTT sp PEG, dislodged and replaced  # C diff colitis  # GI bleed  # acute resp failure    UGIB likely due to GDA territory ulcer   IR embolization if re bleeds  PPI gtt  NPO  PO vanco for c diff  empiric zosyn  titrate pressors as tolerated  albumin, midodrine  appreciate ICU care    DVT ppx hold     [facility]  [phone] 72yo M w/ PMHx of CAD (s/p CABG 2019), CKD (unknown stage), DM2, Parkinson's Disease, HTN, depression presents with new onset acute heart failure exacerbation, NSTEMI, started on hep gtt, developed bl RP bleed, acute anemia. sp MICU course for hemorrhagic shock, 10/28 sp IR embolization l4 and l5 lumbar artery. sp permacath and AVF.    # chronic systolic and diastolic heart failure  # ESRD, new HD, AVF not matured, sp permacath  # Atrial flutter  # Parkinson's disease   # delirium  # CAD (coronary artery disease)  # HTN  # DM2 (diabetes mellitus, type 2)  # FTT sp PEG, dislodged and replaced  # C diff colitis  # GI bleed  # acute resp failure    SBT today  UGIB likely due to GDA territory ulcer   IR embolization if re bleeds  PPI gtt  NPO  PO vanco for c diff  empiric zosyn  titrate pressors as tolerated  albumin, midodrine  appreciate ICU care    DVT ppx hold AC    [facility]  [phone]

## 2022-02-20 NOTE — PROGRESS NOTE ADULT - SUBJECTIVE AND OBJECTIVE BOX
INTERVAL HPI/OVERNIGHT EVENTS:    Patient seen and examined.    Remains intubated in MICU  No episodes of melena, brbpr as per RN    MEDICATIONS  (STANDING):  atorvastatin 80 milliGRAM(s) Oral at bedtime  carbidopa/levodopa  25/100 2.5 Tablet(s) Oral <User Schedule>  carbidopa/levodopa  25/100 2 Tablet(s) Oral <User Schedule>  chlorhexidine 0.12% Liquid 15 milliLiter(s) Oral Mucosa every 12 hours  chlorhexidine 4% Liquid 1 Application(s) Topical <User Schedule>  chlorhexidine 4% Liquid 1 Application(s) Topical <User Schedule>  dexMEDEtomidine Infusion 0.4 MICROgram(s)/kG/Hr (9.64 mL/Hr) IV Continuous <Continuous>  dextrose 40% Gel 15 Gram(s) Oral once  dextrose 5%. 1000 milliLiter(s) (50 mL/Hr) IV Continuous <Continuous>  dextrose 5%. 1000 milliLiter(s) (100 mL/Hr) IV Continuous <Continuous>  dextrose 5%. 1000 milliLiter(s) (20 mL/Hr) IV Continuous <Continuous>  dextrose 50% Injectable 25 Gram(s) IV Push once  dextrose 50% Injectable 12.5 Gram(s) IV Push once  dextrose 50% Injectable 25 Gram(s) IV Push once  diVALproex Sprinkle 250 milliGRAM(s) Oral three times a day  doxazosin 1 milliGRAM(s) Oral at bedtime  DULoxetine 20 milliGRAM(s) Oral daily  folic acid 1 milliGRAM(s) Oral daily  glucagon  Injectable 1 milliGRAM(s) IntraMuscular once  insulin lispro (ADMELOG) corrective regimen sliding scale   SubCutaneous every 6 hours  levothyroxine Injectable 30 MICROGram(s) IV Push at bedtime  memantine 5 milliGRAM(s) Oral at bedtime  metoprolol tartrate Injectable 5 milliGRAM(s) IV Push every 6 hours  midodrine 15 milliGRAM(s) Oral every 8 hours  mirtazapine 7.5 milliGRAM(s) Oral daily  mupirocin 2% Nasal 1 Application(s) Both Nostrils two times a day  Nephro-celeste 1 Tablet(s) Oral daily  norepinephrine Infusion 0.08 MICROgram(s)/kG/Min (14.5 mL/Hr) IV Continuous <Continuous>  pantoprazole Infusion 8 mG/Hr (10 mL/Hr) IV Continuous <Continuous>  piperacillin/tazobactam IVPB.. 3.375 Gram(s) IV Intermittent every 8 hours  propofol Infusion 20 MICROgram(s)/kG/Min (11.6 mL/Hr) IV Continuous <Continuous>  QUEtiapine 50 milliGRAM(s) Oral at bedtime  trihexyphenidyl 2 milliGRAM(s) Oral three times a day  vancomycin    Solution 125 milliGRAM(s) Oral every 6 hours    MEDICATIONS  (PRN):  sodium chloride 0.9% lock flush 10 milliLiter(s) IV Push every 1 hour PRN Pre/post blood products, medications, blood draw, and to maintain line patency      Allergies    adhesives (Rash)  latex (Urticaria)  No Known Drug Allergies    Intolerances    Review of Systems:  unable to obtain    Vital Signs Last 24 Hrs  T(C): 37.9 (18 Feb 2022 08:00), Max: 37.9 (18 Feb 2022 08:00)  T(F): 100.2 (18 Feb 2022 08:00), Max: 100.2 (18 Feb 2022 08:00)  HR: 126 (18 Feb 2022 11:42) (87 - 134)  BP: --  BP(mean): --  RR: 19 (18 Feb 2022 11:00) (0 - 35)  SpO2: 97% (18 Feb 2022 11:42) (96% - 100%)    PHYSICAL EXAM:    GENERAL:  Appears stated age, well-groomed, well-nourished, no distress  HEENT:  NC/AT,  conjunctivae anicteric, clear and pink, +ETT  CHEST:  intubated  HEART:  Regular rhythm, no JVD  ABDOMEN:  Soft, non-tender, non-distended, normoactive bowel sounds,  no masses , no hepatosplenomegaly, +gastrostomy   EXTREMITIES:  no cyanosis,clubbing or edema  NEURO:  unable to access   RECTAL: Deferred      LABS:                        9.7    12.22 )-----------( 142      ( 18 Feb 2022 01:14 )             28.5     02-18    131<L>  |  97  |  32<H>  ----------------------------<  144<H>  3.4<L>   |  23  |  1.60<H>    Ca    7.8<L>      18 Feb 2022 01:14  Phos  2.0     02-18  Mg     1.7     02-18    TPro  5.5<L>  /  Alb  1.7<L>  /  TBili  0.6  /  DBili  x   /  AST  25  /  ALT  <5<L>  /  AlkPhos  70  02-18    PT/INR - ( 17 Feb 2022 01:19 )   PT: 11.8 sec;   INR: 0.98 ratio         PTT - ( 17 Feb 2022 01:19 )  PTT:37.5 sec      RADIOLOGY & ADDITIONAL TESTS:

## 2022-02-20 NOTE — PROGRESS NOTE ADULT - ASSESSMENT
M w/ HTN, DM2, CAD-CABG, Parkinson's disease, and advanced CKD, 10/21/21 p/w LE swelling, c/b COVID; now newly ESRD-HD as of this admission    (1)Renal - ESRD-HD last HD yesterday  (2)Anemia- HGB stable   (3)Failure to thrive and confusion   (4) CV- on norepi and midodrine    RECOMMEND  (1)S/P EGD and remains intubated after the procedure and recovering in the MICU sp cautery.  Protonix IVP q12hrs  (2wean pressors as able  (3) PO Vanco for C diff       Jada Guardado, ZACH  [facility]   [phone]

## 2022-02-20 NOTE — PROGRESS NOTE ADULT - SUBJECTIVE AND OBJECTIVE BOX
INTERVAL HPI/OVERNIGHT EVENTS:      SUBJECTIVE: Patient seen and examined at bedside.       CONSTITUTIONAL: No fevers or chills  EYES/ENT: No visual or hearing changes;  No ear or throat pain   NECK: No pain or stiffness  RESPIRATORY: No cough, wheezing, hemoptysis; No shortness of breath  CARDIOVASCULAR: No chest pain or palpitations  GASTROINTESTINAL: No abdominal pain. No nausea, vomiting, or hematemesis; No diarrhea or constipation. No melena or hematochezia.  GENITOURINARY: No dysuria, frequency or hematuria  NEUROLOGICAL: No headache, numbness or weakness  SKIN: No itching, or new onset of rashes    OBJECTIVE:    VITAL SIGNS:  ICU Vital Signs Last 24 Hrs  T(C): 36 (20 Feb 2022 04:00), Max: 37.3 (19 Feb 2022 08:00)  T(F): 96.8 (20 Feb 2022 04:00), Max: 99.1 (19 Feb 2022 08:00)  HR: 79 (20 Feb 2022 07:00) (61 - 131)  BP: --  BP(mean): --  ABP: 132/51 (20 Feb 2022 07:00) (89/38 - 168/65)  ABP(mean): 80 (20 Feb 2022 07:00) (55 - 102)  RR: 16 (20 Feb 2022 07:00) (12 - 28)  SpO2: 99% (20 Feb 2022 07:00) (93% - 100%)    Mode: AC/ CMV (Assist Control/ Continuous Mandatory Ventilation), RR (machine): 8, TV (machine): 400, FiO2: 21, PEEP: 5, ITime: 1, MAP: 9, PIP: 26    02-19 @ 07:01  -  02-20 @ 07:00  --------------------------------------------------------  IN: 1829.8 mL / OUT: 1150 mL / NET: 679.8 mL      CAPILLARY BLOOD GLUCOSE      POCT Blood Glucose.: 132 mg/dL (20 Feb 2022 05:45)      PHYSICAL EXAM:    General: NAD  HEENT: NC/AT; PERRL, clear conjunctiva  Neck: supple  Respiratory: Normal respiratory effort; CTA b/l  Cardiovascular: +S1/S2; RRR; no murmurs, gallops or rubs appreciated  Abdomen: soft, NT/ND, no masses noted; normal BS all 4 quadrants;  Extremities: 2+ peripheral pulses b/l; no LE edema  Skin: normal color and turgor; no rash  Neurological: A+O x 3, follows commands; spontaneously moving all 4 extremities, strength grossly WNL    MEDICATIONS:  MEDICATIONS  (STANDING):  albumin human 25% IVPB 50 milliLiter(s) IV Intermittent every 6 hours  atorvastatin 80 milliGRAM(s) Oral at bedtime  carbidopa/levodopa  25/100 2.5 Tablet(s) Oral <User Schedule>  carbidopa/levodopa  25/100 2 Tablet(s) Oral <User Schedule>  chlorhexidine 0.12% Liquid 15 milliLiter(s) Oral Mucosa every 12 hours  chlorhexidine 4% Liquid 1 Application(s) Topical <User Schedule>  chlorhexidine 4% Liquid 1 Application(s) Topical <User Schedule>  dexMEDEtomidine Infusion 0.4 MICROgram(s)/kG/Hr (9.64 mL/Hr) IV Continuous <Continuous>  dextrose 40% Gel 15 Gram(s) Oral once  dextrose 5% + sodium chloride 0.9%. 1000 milliLiter(s) (20 mL/Hr) IV Continuous <Continuous>  dextrose 5%. 1000 milliLiter(s) (50 mL/Hr) IV Continuous <Continuous>  dextrose 5%. 1000 milliLiter(s) (100 mL/Hr) IV Continuous <Continuous>  dextrose 50% Injectable 25 Gram(s) IV Push once  dextrose 50% Injectable 25 Gram(s) IV Push once  dextrose 50% Injectable 12.5 Gram(s) IV Push once  diVALproex Sprinkle 250 milliGRAM(s) Oral three times a day  doxazosin 1 milliGRAM(s) Oral at bedtime  DULoxetine 20 milliGRAM(s) Oral daily  folic acid 1 milliGRAM(s) Oral daily  glucagon  Injectable 1 milliGRAM(s) IntraMuscular once  insulin lispro (ADMELOG) corrective regimen sliding scale   SubCutaneous every 6 hours  levothyroxine Injectable 30 MICROGram(s) IV Push at bedtime  memantine 5 milliGRAM(s) Oral at bedtime  midodrine 15 milliGRAM(s) Oral every 8 hours  mirtazapine 7.5 milliGRAM(s) Oral daily  mupirocin 2% Nasal 1 Application(s) Both Nostrils two times a day  Nephro-celeste 1 Tablet(s) Oral daily  norepinephrine Infusion 0.08 MICROgram(s)/kG/Min (14.5 mL/Hr) IV Continuous <Continuous>  pantoprazole  Injectable 40 milliGRAM(s) IV Push every 12 hours  piperacillin/tazobactam IVPB.. 3.375 Gram(s) IV Intermittent every 8 hours  propofol Infusion 20 MICROgram(s)/kG/Min (11.6 mL/Hr) IV Continuous <Continuous>  QUEtiapine 50 milliGRAM(s) Oral at bedtime  trihexyphenidyl 2 milliGRAM(s) Oral three times a day  vancomycin    Solution 125 milliGRAM(s) Oral every 6 hours    MEDICATIONS  (PRN):  sodium chloride 0.9% lock flush 10 milliLiter(s) IV Push every 1 hour PRN Pre/post blood products, medications, blood draw, and to maintain line patency      ALLERGIES:  Allergies    adhesives (Rash)  latex (Urticaria)  No Known Drug Allergies    Intolerances              LABS:                        9.0    14.62 )-----------( 119      ( 20 Feb 2022 00:46 )             27.3     02-20    132<L>  |  99  |  21  ----------------------------<  149<H>  3.6   |  22  |  1.64<H>    Ca    7.8<L>      20 Feb 2022 00:46  Phos  2.7     02-20  Mg     1.7     02-20    TPro  5.6<L>  /  Alb  1.4<L>  /  TBili  0.6  /  DBili  x   /  AST  17  /  ALT  <5<L>  /  AlkPhos  81  02-20          RADIOLOGY & ADDITIONAL TESTS: Reviewed. INTERVAL HPI/OVERNIGHT EVENTS:  1 melena overnight. Hypothermic, and now on warming blanket. Still on Levo and Precedex, weaning down. On minimal vent setting, and overbreathing the vent. S/p HD 2/19 PM w/ 1L removal. Urinating, but minimal UOP.     SUBJECTIVE: Patient seen and examined at bedside.   Unable to elicit ROS 2/2 intubation and sedation.       OBJECTIVE:    VITAL SIGNS:  ICU Vital Signs Last 24 Hrs  T(C): 36 (20 Feb 2022 04:00), Max: 37.3 (19 Feb 2022 08:00)  T(F): 96.8 (20 Feb 2022 04:00), Max: 99.1 (19 Feb 2022 08:00)  HR: 79 (20 Feb 2022 07:00) (61 - 131)  BP: --  BP(mean): --  ABP: 132/51 (20 Feb 2022 07:00) (89/38 - 168/65)  ABP(mean): 80 (20 Feb 2022 07:00) (55 - 102)  RR: 16 (20 Feb 2022 07:00) (12 - 28)  SpO2: 99% (20 Feb 2022 07:00) (93% - 100%)    Mode: AC/ CMV (Assist Control/ Continuous Mandatory Ventilation), RR (machine): 8, TV (machine): 400, FiO2: 21, PEEP: 5, ITime: 1, MAP: 9, PIP: 26    02-19 @ 07:01  -  02-20 @ 07:00  --------------------------------------------------------  IN: 1829.8 mL / OUT: 1150 mL / NET: 679.8 mL      CAPILLARY BLOOD GLUCOSE      POCT Blood Glucose.: 132 mg/dL (20 Feb 2022 05:45)      PHYSICAL EXAM:    GENERAL: NAD at rest  NECK: Supple  CHEST/LUNG: CTABL, No wheeze  HEART: Regular rhythm, mildly tachycardic; No murmurs, rubs, or gallops  ABDOMEN: Soft, (+) face grimace with palpation, diffusely; Nondistended; Bowel sounds present  EXTREMITIES: Left forearm avf, right chest wall permcath  SKIN: No rashes or lesions    MEDICATIONS:  MEDICATIONS  (STANDING):  albumin human 25% IVPB 50 milliLiter(s) IV Intermittent every 6 hours  atorvastatin 80 milliGRAM(s) Oral at bedtime  carbidopa/levodopa  25/100 2.5 Tablet(s) Oral <User Schedule>  carbidopa/levodopa  25/100 2 Tablet(s) Oral <User Schedule>  chlorhexidine 0.12% Liquid 15 milliLiter(s) Oral Mucosa every 12 hours  chlorhexidine 4% Liquid 1 Application(s) Topical <User Schedule>  chlorhexidine 4% Liquid 1 Application(s) Topical <User Schedule>  dexMEDEtomidine Infusion 0.4 MICROgram(s)/kG/Hr (9.64 mL/Hr) IV Continuous <Continuous>  dextrose 40% Gel 15 Gram(s) Oral once  dextrose 5% + sodium chloride 0.9%. 1000 milliLiter(s) (20 mL/Hr) IV Continuous <Continuous>  dextrose 5%. 1000 milliLiter(s) (50 mL/Hr) IV Continuous <Continuous>  dextrose 5%. 1000 milliLiter(s) (100 mL/Hr) IV Continuous <Continuous>  dextrose 50% Injectable 25 Gram(s) IV Push once  dextrose 50% Injectable 25 Gram(s) IV Push once  dextrose 50% Injectable 12.5 Gram(s) IV Push once  diVALproex Sprinkle 250 milliGRAM(s) Oral three times a day  doxazosin 1 milliGRAM(s) Oral at bedtime  DULoxetine 20 milliGRAM(s) Oral daily  folic acid 1 milliGRAM(s) Oral daily  glucagon  Injectable 1 milliGRAM(s) IntraMuscular once  insulin lispro (ADMELOG) corrective regimen sliding scale   SubCutaneous every 6 hours  levothyroxine Injectable 30 MICROGram(s) IV Push at bedtime  memantine 5 milliGRAM(s) Oral at bedtime  midodrine 15 milliGRAM(s) Oral every 8 hours  mirtazapine 7.5 milliGRAM(s) Oral daily  mupirocin 2% Nasal 1 Application(s) Both Nostrils two times a day  Nephro-celeste 1 Tablet(s) Oral daily  norepinephrine Infusion 0.08 MICROgram(s)/kG/Min (14.5 mL/Hr) IV Continuous <Continuous>  pantoprazole  Injectable 40 milliGRAM(s) IV Push every 12 hours  piperacillin/tazobactam IVPB.. 3.375 Gram(s) IV Intermittent every 8 hours  propofol Infusion 20 MICROgram(s)/kG/Min (11.6 mL/Hr) IV Continuous <Continuous>  QUEtiapine 50 milliGRAM(s) Oral at bedtime  trihexyphenidyl 2 milliGRAM(s) Oral three times a day  vancomycin    Solution 125 milliGRAM(s) Oral every 6 hours    MEDICATIONS  (PRN):  sodium chloride 0.9% lock flush 10 milliLiter(s) IV Push every 1 hour PRN Pre/post blood products, medications, blood draw, and to maintain line patency      ALLERGIES:  Allergies    adhesives (Rash)  latex (Urticaria)  No Known Drug Allergies    Intolerances      LABS:                        9.0    14.62 )-----------( 119      ( 20 Feb 2022 00:46 )             27.3     02-20    132<L>  |  99  |  21  ----------------------------<  149<H>  3.6   |  22  |  1.64<H>    Ca    7.8<L>      20 Feb 2022 00:46  Phos  2.7     02-20  Mg     1.7     02-20    TPro  5.6<L>  /  Alb  1.4<L>  /  TBili  0.6  /  DBili  x   /  AST  17  /  ALT  <5<L>  /  AlkPhos  81  02-20          RADIOLOGY & ADDITIONAL TESTS: Reviewed.

## 2022-02-20 NOTE — PROGRESS NOTE ADULT - ATTENDING COMMENTS
70 y/o M w/CAD and CKD initially admitted for NSTEMI and acute heart failure w/hospital course c/b hemorrhagic shock secondary to RP bleed s/p IR embolization and ABBY on CKD now w/ESRD requiring initiation of HD now with acute blood loss anemia and likely hemorrhagic shock secondary to UGIB s/p EGD with cauterization. Patient remained intubated post-procedure for acute respiratory failure.     - Sedation as needed goal RASS -1 to 0  - Continue home antidepressants  - Daily SAT/SBT  - Titrate pressors as needed goal MAP >= 65  - Continue abx, PO vanc for possible C. Diff. Specimen was indeterminate  - PPI, IR if rebleeds  - Trend CBC, transfuse PRN for hgb < 7  - HD as per renal  - Free water restriction for hyponatremia 72 y/o M w/CAD and CKD initially admitted for NSTEMI and acute heart failure w/hospital course c/b hemorrhagic shock secondary to RP bleed s/p IR embolization and ABBY on CKD now w/ESRD requiring initiation of HD now with acute blood loss anemia and likely hemorrhagic shock secondary to UGIB s/p EGD with cauterization. Patient remained intubated post-procedure for acute respiratory failure.     - Sedation as needed goal RASS -1 to 0  - Continue home antidepressants  - Daily SAT/SBT  - Titrate pressors as needed goal MAP >= 65  - Continue abx, PO vanc for possible C. Diff. Specimen was indeterminate  - PPI, IR if rebleeds  - Trend CBC, transfuse PRN for hgb < 7  - HD as per renal  - Free water restriction for hyponatremia  - DNR/DNI

## 2022-02-20 NOTE — PROGRESS NOTE ADULT - ASSESSMENT
72yo M w/ PMHx of HTN,  CAD (s/p CABG 2019), CKD (unknown stage), DM2, Parkinson's Disease, depression, dementia  who presented on 10/21/21 p/w LE swelling, was found to be in new onset acute HF exacerbation and NSTEMI s/p hep gtt, s/p MICU course for hemorrhagic shock 2/2 b/l RP bleed s/p IR embolization l4 and l5 lumbar artery on 10/28. Hospital course c/b COVID & new ESRD-HD as of this admission, s/p PermCath and AVF (maturing), new PEG. Further c/b acute blood loss anemia 2/2 UGIB 2/2 duodenal ulcer w/ active bleeding vessel s/p endoscopy on 2/16 with epi and cauterization, remains intubated post procedure and admitted to MICU for monitoring and management.    Neuro   - Wean propofol, and switch to Precedex in prep for extubation  - Avoid haldo/zyprexa for agitation due to hx of PD; may use Ativan 0.25 prn if agitated when weaning sedation    #Parkinson disease   - c/w Sinemet 25/100 2.5 TAB 6 am and 2pm  - c/w Sinemet 25/100 2 TAB 10pm   - c/w Artane 2mg TID     #Depression   - c/w Cymbalta 20mg qd    - c/w Divalproex 250mg tid  - c/w Seroquel 50mg qhs  - c/w Remeron 7.5mg qd    #Dementia   - c/w Namenda 5mg qhs    CV  #Shock state: possible sepsis.   - Wean Levo as tolerated;   - Midodrine dose held overnight due to bradycardia   - C/w midodrine 15mg q8 hours with hold for bradycardia   - Treat sepsis empirically    #Heart failure & CAD s/p CABG 2019  - Hold metoprolol 25mg given hypotension and presser requirements   - cont to hold Lasix iso GIB  - c/w atorvastatin 80mg qhs    PULM  - Recent COVID infection, PCR still positive, no isolation  - Intubated prior to endoscopy, plan to wean and extubate after dialysis   - Alkalotic on vent; decreased TV   - PS trial when weaning sedation, and trial of extubation if able to tolerate    GI  #Acute blood loss anemia 2/2 UGIB 2/2 duodenal ulcer w/ active bleeding vessel   - S/p Endoscopy w/duodenal ulcer s/p tx w/ epi and cautery on 2/16  - If re-bleeds will require IR intervention  - Hemoglobin downtrending from yesterday   - Transitioned from PPI ggt to IV BID PPI   - C/w TF as tolerated  - H&H stable; Trend CBC daily and keep active T&S     #C-diff  Positive on 2/13  - C/w vanco 125mg q6h via PEG x 10 days      #ESRD on HD via permacath (placed 2/10/22 R [facility]SEPH)   - No urine output currently  - HD per nephro  - C/w nephro nina qd   - Replete lytes as appropriate  - c/w Sensipar 60mg qd for hypercalcemia    #BPH   - C/w doxazosin     ENDO  #DMT2  - FS q 6 hrs with ISS q 6 hrs while on TF  - ENDO: Suggest DC on Tradjenta 5mg daily, discontinue prior hypoglycemic agents/insulin, endo FU 4 weeks.    #Hypothyroidism.   - continue IV levothyroxine to 30 mg due to subclinical hypothyroidism and hypotension    HEME   # Acute blood loss 2/2 UGIB 2/2 duodenal ulcer   - Hold AC or DVT ppx  - H&H stable, trend CBC qd and active T&S    #Hx of b/l RP bleed during this admission   -s/p Abdominal Angiography and Embolization on 10/29/2021 by Interventional radiology of l4and l5 lumbar artery     ID  # Sepsis   - 2/17 Bcx, NGTD   - Empiric coverage with Zosyn  - MRSA swab neg (MSSA positive, started on mupirocin nasal ointment for 10 days)  - C/w vancomycin 125 mg q 6 hrs via PEG for C. diff    PPX  - GI ppx ; PPI gtt --> IV BID on 2/19  - DVT ppx ; compression devices    Ethics  - DNR/DNI  - Discussed with family and they want no reintubation if the patient goes into respiratory failure after extubation they would want a comfort care approach  70yo M w/ PMHx of HTN,  CAD (s/p CABG 2019), CKD (unknown stage), DM2, Parkinson's Disease, depression, dementia  who presented on 10/21/21 p/w LE swelling, was found to be in new onset acute HF exacerbation and NSTEMI s/p hep gtt, s/p MICU course for hemorrhagic shock 2/2 b/l RP bleed s/p IR embolization l4 and l5 lumbar artery on 10/28. Hospital course c/b COVID & new ESRD-HD as of this admission, s/p PermCath and AVF (maturing), new PEG. Further c/b acute blood loss anemia 2/2 UGIB 2/2 duodenal ulcer w/ active bleeding vessel s/p endoscopy on 2/16 with epi and cauterization, remains intubated post procedure and admitted to MICU for monitoring and management.    Neuro   - Off propofol, and wean Precedex in prep for extubation  - Avoid haldo/zyprexa for agitation due to hx of PD; may use Ativan 0.25 prn if agitated when weaning sedation    #Parkinson disease   - c/w Sinemet 25/100 2.5 TAB 6 am and 2pm  - c/w Sinemet 25/100 2 TAB 10pm   - c/w Artane 2mg TID     #Depression   - c/w Cymbalta 20mg qd    - c/w Divalproex 250mg tid  - c/w Seroquel 50mg qhs  - c/w Remeron 7.5mg qd    #Dementia   - c/w Namenda 5mg qhs    CV  #Shock state: possible sepsis vs. vasoplegia  - Wean Levo as tolerated;   - C/w midodrine 15mg q8 hours with hold for bradycardia   - Treat sepsis empirically    #Heart failure & CAD s/p CABG 2019  - Hold metoprolol 25mg given hypotension and presser requirements   - cont to hold Lasix iso GIB  - c/w atorvastatin 80mg qhs    PULM  - Recent COVID infection, PCR still positive, no isolation  - Intubated prior to endoscopy, plan to wean and extubate after another session of dialysis   - PS trial when weaning sedation, and trial of extubation if able to tolerate    GI  #Acute blood loss anemia 2/2 UGIB 2/2 duodenal ulcer w/ active bleeding vessel   - S/p Endoscopy w/duodenal ulcer s/p tx w/ epi and cautery on 2/16  - If re-bleeds will require IR intervention  - Transitioned from PPI ggt to IV BID PPI   - Restart TF as tolerated  - H&H stable 2/20; Trend CBC daily and keep active T&S     #C-diff  Positive on 2/13  - C/w vanco 125mg q6h via PEG x 10 days      #ESRD on HD via permacath (placed 2/10/22 R IJ); New LUE AVF  - Minimal urine output currently  - HD per nephro  - C/w nephro nina qd   - Replete lytes as appropriate  - c/w Sensipar 60mg qd for hypercalcemia    #BPH   - C/w doxazosin     ENDO  #DMT2  - FS q 6 hrs with ISS q 6 hrs while on TF or NPO  - ENDO: Suggest DC on Tradjenta 5mg daily, discontinue prior hypoglycemic agents/insulin, endo FU 4 weeks.    #Hypothyroidism.   - continue IV levothyroxine to 30 mg due to subclinical hypothyroidism and hypotension    HEME   # Acute blood loss 2/2 UGIB 2/2 duodenal ulcer   - Hold AC or DVT ppx  - H&H stable, trend CBC qd and active T&S    #Hx of b/l RP bleed during this admission   -s/p Abdominal Angiography and Embolization on 10/29/2021 by Interventional radiology of l4and l5 lumbar artery     ID  # Sepsis   - 2/17 Bcx, NGTD   - Empiric coverage with Zosyn  - MRSA swab neg (MSSA positive, started on mupirocin nasal ointment for 10 days)  - C/w vancomycin 125 mg q 6 hrs via PEG for C. diff    PPX  - GI ppx ; PPI gtt --> IV BID on 2/19  - DVT ppx ; compression devices    Ethics  - DNR/DNI  - Discussed with family and they want no reintubation if the patient goes into respiratory failure after extubation they would want a comfort care approach; would like to be present for extubation.

## 2022-02-20 NOTE — PROGRESS NOTE ADULT - ASSESSMENT
Assessment  DMT2: 71y Male with DM T2 with hyperglycemia, A1C 6.4%, was on insulin at home, now on insulin coverage only, blood sugars are stable and trending within acceptable range, no hypoglycemias, remains intubated and NPO in the icu.  Hypothyroidism: Patient has no history thyroid disease, was not on any thyroid supplements, subclinical with low-normal FT4 and lethargy, on synthroid 12.5 mcg IV daily, repeat   TFTs show subclinical state, increased to synthroid 20mcg IV.  Hypercalcemia: Started on Sensipar 60mg daily, Ca improving.  CHF: on medications, stable, monitored.  HTN: Controlled,  on antihypertensive medications.  Parkinsons: on meds, monitored.  CKD: Monitor labs/BMP       Assessment  DMT2: 71y Male with DM T2 with hyperglycemia, A1C 6.4%, was on insulin at home, now on insulin coverage only, blood sugars are stable and trending within acceptable range, no hypoglycemias, remains intubated and NPO in the icu.  Hypothyroidism: Patient has no history thyroid disease, was not on any thyroid supplements, subclinical with low-normal FT4 and lethargy, on synthroid 12.5 mcg IV daily, repeat   TFTs show subclinical state, increased to synthroid 20mcg IV.  Hypercalcemia: Started on Sensipar 60mg daily, Ca improving.  CHF: on medications, stable, monitored.  HTN: Controlled,  on antihypertensive medications.  Parkinsons: on meds, monitored.  CKD: Monitor labs/BMP    john kelvin DAMON  [phone]

## 2022-02-20 NOTE — PROGRESS NOTE ADULT - ASSESSMENT
71 M w volume overload, GI consulted for drop in hgb    1. GI Bleeding  -s/p endoscopy 2/16 which showed spurting duodenal ulcer; ulcer injected with epi for hemostasis, cautery and hemospray used.  -hgb 9.7 today  -high dose PPI  -may resume tube feeds  -if bleeding occurs again would need embolization by IR  -monitor for GI bleeding  -trend CBCs  -family considering more palliative goals of care      2. ABBY on CKD per renal  -on HD via permacath per renal, midodrine during dialysis  -AVF not functional   -s/p tunnelled HD catheter insertion by IR 2/10    3. RP bleed  -s/p Abdominal Angiography and Embolization on 10/29/2021 by Interventional radiology of l4and l5 lumbar artery     3. C-diff  -on vanco     Attending supervision statement: I have personally seen and examined the patient. I fully participated in the care of this patient. I have made amendments to the documentation where necessary, and agree with the history, physical exam, and plan as outlined by the ACP.      [facility]  Gastroenterology and Hepatology  [address] [place], [place]  Office: [phone]  Cell: [phone]

## 2022-02-20 NOTE — PROGRESS NOTE ADULT - SUBJECTIVE AND OBJECTIVE BOX
Patient is a 71y old  Male who presents with a chief complaint of leg swelling (20 Feb 2022 07:02)      SUBJECTIVE / OVERNIGHT EVENTS:    Patient seen and examined. intubated sedated. cannot provide ros. some melena as dw rn.      Vital Signs Last 24 Hrs  T(C): 37.4 (20 Feb 2022 12:00), Max: 37.4 (20 Feb 2022 12:00)  T(F): 99.3 (20 Feb 2022 12:00), Max: 99.3 (20 Feb 2022 12:00)  HR: 106 (20 Feb 2022 12:30) (61 - 131)  BP: --  BP(mean): --  RR: 25 (20 Feb 2022 12:30) (12 - 37)  SpO2: 99% (20 Feb 2022 12:30) (93% - 100%)  I&O's Summary    19 Feb 2022 07:01  -  20 Feb 2022 07:00  --------------------------------------------------------  IN: 1906.5 mL / OUT: 1150 mL / NET: 756.5 mL    20 Feb 2022 07:01  -  20 Feb 2022 13:23  --------------------------------------------------------  IN: 301.8 mL / OUT: 0 mL / NET: 301.8 mL        PE:  GENERAL: NAD, frail, intubated  HEAD:  Atraumatic, Normocephalic  CHEST/LUNG: Coarse BS  HEART: Regular rate and rhythm; no murmur  ABDOMEN: Soft, Nontender, distended; Bowel sounds present, PEG   EXTREMITIES:  2+ Peripheral Pulses, UE bl edema  NEURO: sedated    LABS:                        9.0    14.62 )-----------( 119      ( 20 Feb 2022 00:46 )             27.3     02-20    132<L>  |  99  |  21  ----------------------------<  149<H>  3.6   |  22  |  1.64<H>    Ca    7.8<L>      20 Feb 2022 00:46  Phos  2.7     02-20  Mg     1.7     02-20    TPro  5.6<L>  /  Alb  1.4<L>  /  TBili  0.6  /  DBili  x   /  AST  17  /  ALT  <5<L>  /  AlkPhos  81  02-20      CAPILLARY BLOOD GLUCOSE      POCT Blood Glucose.: 126 mg/dL (20 Feb 2022 12:15)  POCT Blood Glucose.: 132 mg/dL (20 Feb 2022 05:45)  POCT Blood Glucose.: 123 mg/dL (19 Feb 2022 18:34)            RADIOLOGY & ADDITIONAL TESTS:    Imaging Personally Reviewed:  [x] YES  [ ] NO    Consultant(s) Notes Reviewed:  [x] YES  [ ] NO    MEDICATIONS  (STANDING):  albumin human 25% IVPB 50 milliLiter(s) IV Intermittent every 6 hours  atorvastatin 80 milliGRAM(s) Oral at bedtime  carbidopa/levodopa  25/100 2.5 Tablet(s) Oral <User Schedule>  carbidopa/levodopa  25/100 2 Tablet(s) Oral <User Schedule>  chlorhexidine 0.12% Liquid 15 milliLiter(s) Oral Mucosa every 12 hours  chlorhexidine 4% Liquid 1 Application(s) Topical <User Schedule>  chlorhexidine 4% Liquid 1 Application(s) Topical <User Schedule>  dexMEDEtomidine Infusion 0.4 MICROgram(s)/kG/Hr (9.64 mL/Hr) IV Continuous <Continuous>  dextrose 40% Gel 15 Gram(s) Oral once  dextrose 5% + sodium chloride 0.9%. 1000 milliLiter(s) (20 mL/Hr) IV Continuous <Continuous>  dextrose 5%. 1000 milliLiter(s) (50 mL/Hr) IV Continuous <Continuous>  dextrose 5%. 1000 milliLiter(s) (100 mL/Hr) IV Continuous <Continuous>  dextrose 50% Injectable 25 Gram(s) IV Push once  dextrose 50% Injectable 12.5 Gram(s) IV Push once  dextrose 50% Injectable 25 Gram(s) IV Push once  diVALproex Sprinkle 250 milliGRAM(s) Oral three times a day  doxazosin 1 milliGRAM(s) Oral at bedtime  DULoxetine 20 milliGRAM(s) Oral daily  folic acid 1 milliGRAM(s) Oral daily  glucagon  Injectable 1 milliGRAM(s) IntraMuscular once  insulin lispro (ADMELOG) corrective regimen sliding scale   SubCutaneous every 6 hours  levothyroxine Injectable 30 MICROGram(s) IV Push at bedtime  memantine 5 milliGRAM(s) Oral at bedtime  midodrine 15 milliGRAM(s) Oral every 8 hours  mirtazapine 7.5 milliGRAM(s) Oral daily  mupirocin 2% Nasal 1 Application(s) Both Nostrils two times a day  Nephro-celeste 1 Tablet(s) Oral daily  norepinephrine Infusion 0.08 MICROgram(s)/kG/Min (14.5 mL/Hr) IV Continuous <Continuous>  pantoprazole  Injectable 40 milliGRAM(s) IV Push every 12 hours  piperacillin/tazobactam IVPB.. 3.375 Gram(s) IV Intermittent every 8 hours  propofol Infusion 20 MICROgram(s)/kG/Min (11.6 mL/Hr) IV Continuous <Continuous>  QUEtiapine 50 milliGRAM(s) Oral at bedtime  trihexyphenidyl 2 milliGRAM(s) Oral three times a day  vancomycin    Solution 125 milliGRAM(s) Oral every 6 hours    MEDICATIONS  (PRN):  sodium chloride 0.9% lock flush 10 milliLiter(s) IV Push every 1 hour PRN Pre/post blood products, medications, blood draw, and to maintain line patency      Care Discussed with Consultants/Other Providers [x] YES  [ ] NO    HEALTH ISSUES - PROBLEM Dx:  Acute heart failure    Atrial flutter    DM2 (diabetes mellitus, type 2)    CAD (coronary artery disease)    Parkinsons disease    Acute on chronic renal failure    NSTEMI (non-ST elevation myocardial infarction)    Hypertension    Acute kidney injury superimposed on CKD    Anemia    Hypothyroidism    Delirium    Advanced care planning/counseling discussion    Palliative care status    Palliative care encounter    Pain    Stage 5 chronic kidney disease not on chronic dialysis    Hypercalcemia    Preop cardiovascular exam         Patient is a 71y old  Male who presents with a chief complaint of leg swelling (20 Feb 2022 07:02)      SUBJECTIVE / OVERNIGHT EVENTS:    Patient seen and examined. intubated sedated. cannot provide ros. some melena as dw RN. on SBT.      Vital Signs Last 24 Hrs  T(C): 37.4 (20 Feb 2022 12:00), Max: 37.4 (20 Feb 2022 12:00)  T(F): 99.3 (20 Feb 2022 12:00), Max: 99.3 (20 Feb 2022 12:00)  HR: 106 (20 Feb 2022 12:30) (61 - 131)  BP: --  BP(mean): --  RR: 25 (20 Feb 2022 12:30) (12 - 37)  SpO2: 99% (20 Feb 2022 12:30) (93% - 100%)  I&O's Summary    19 Feb 2022 07:01  -  20 Feb 2022 07:00  --------------------------------------------------------  IN: 1906.5 mL / OUT: 1150 mL / NET: 756.5 mL    20 Feb 2022 07:01  -  20 Feb 2022 13:23  --------------------------------------------------------  IN: 301.8 mL / OUT: 0 mL / NET: 301.8 mL        PE:  GENERAL: NAD, frail, intubated  HEAD:  Atraumatic, Normocephalic  CHEST/LUNG: Coarse BS  HEART: Regular rate and rhythm; no murmur  ABDOMEN: Soft, Nontender, distended; Bowel sounds present, PEG   EXTREMITIES:  2+ Peripheral Pulses, UE bl edema  NEURO: sedated    LABS:                        9.0    14.62 )-----------( 119      ( 20 Feb 2022 00:46 )             27.3     02-20    132<L>  |  99  |  21  ----------------------------<  149<H>  3.6   |  22  |  1.64<H>    Ca    7.8<L>      20 Feb 2022 00:46  Phos  2.7     02-20  Mg     1.7     02-20    TPro  5.6<L>  /  Alb  1.4<L>  /  TBili  0.6  /  DBili  x   /  AST  17  /  ALT  <5<L>  /  AlkPhos  81  02-20      CAPILLARY BLOOD GLUCOSE      POCT Blood Glucose.: 126 mg/dL (20 Feb 2022 12:15)  POCT Blood Glucose.: 132 mg/dL (20 Feb 2022 05:45)  POCT Blood Glucose.: 123 mg/dL (19 Feb 2022 18:34)            RADIOLOGY & ADDITIONAL TESTS:    Imaging Personally Reviewed:  [x] YES  [ ] NO    Consultant(s) Notes Reviewed:  [x] YES  [ ] NO    MEDICATIONS  (STANDING):  albumin human 25% IVPB 50 milliLiter(s) IV Intermittent every 6 hours  atorvastatin 80 milliGRAM(s) Oral at bedtime  carbidopa/levodopa  25/100 2.5 Tablet(s) Oral <User Schedule>  carbidopa/levodopa  25/100 2 Tablet(s) Oral <User Schedule>  chlorhexidine 0.12% Liquid 15 milliLiter(s) Oral Mucosa every 12 hours  chlorhexidine 4% Liquid 1 Application(s) Topical <User Schedule>  chlorhexidine 4% Liquid 1 Application(s) Topical <User Schedule>  dexMEDEtomidine Infusion 0.4 MICROgram(s)/kG/Hr (9.64 mL/Hr) IV Continuous <Continuous>  dextrose 40% Gel 15 Gram(s) Oral once  dextrose 5% + sodium chloride 0.9%. 1000 milliLiter(s) (20 mL/Hr) IV Continuous <Continuous>  dextrose 5%. 1000 milliLiter(s) (50 mL/Hr) IV Continuous <Continuous>  dextrose 5%. 1000 milliLiter(s) (100 mL/Hr) IV Continuous <Continuous>  dextrose 50% Injectable 25 Gram(s) IV Push once  dextrose 50% Injectable 12.5 Gram(s) IV Push once  dextrose 50% Injectable 25 Gram(s) IV Push once  diVALproex Sprinkle 250 milliGRAM(s) Oral three times a day  doxazosin 1 milliGRAM(s) Oral at bedtime  DULoxetine 20 milliGRAM(s) Oral daily  folic acid 1 milliGRAM(s) Oral daily  glucagon  Injectable 1 milliGRAM(s) IntraMuscular once  insulin lispro (ADMELOG) corrective regimen sliding scale   SubCutaneous every 6 hours  levothyroxine Injectable 30 MICROGram(s) IV Push at bedtime  memantine 5 milliGRAM(s) Oral at bedtime  midodrine 15 milliGRAM(s) Oral every 8 hours  mirtazapine 7.5 milliGRAM(s) Oral daily  mupirocin 2% Nasal 1 Application(s) Both Nostrils two times a day  Nephro-celeste 1 Tablet(s) Oral daily  norepinephrine Infusion 0.08 MICROgram(s)/kG/Min (14.5 mL/Hr) IV Continuous <Continuous>  pantoprazole  Injectable 40 milliGRAM(s) IV Push every 12 hours  piperacillin/tazobactam IVPB.. 3.375 Gram(s) IV Intermittent every 8 hours  propofol Infusion 20 MICROgram(s)/kG/Min (11.6 mL/Hr) IV Continuous <Continuous>  QUEtiapine 50 milliGRAM(s) Oral at bedtime  trihexyphenidyl 2 milliGRAM(s) Oral three times a day  vancomycin    Solution 125 milliGRAM(s) Oral every 6 hours    MEDICATIONS  (PRN):  sodium chloride 0.9% lock flush 10 milliLiter(s) IV Push every 1 hour PRN Pre/post blood products, medications, blood draw, and to maintain line patency      Care Discussed with Consultants/Other Providers [x] YES  [ ] NO    HEALTH ISSUES - PROBLEM Dx:  Acute heart failure    Atrial flutter    DM2 (diabetes mellitus, type 2)    CAD (coronary artery disease)    Parkinsons disease    Acute on chronic renal failure    NSTEMI (non-ST elevation myocardial infarction)    Hypertension    Acute kidney injury superimposed on CKD    Anemia    Hypothyroidism    Delirium    Advanced care planning/counseling discussion    Palliative care status    Palliative care encounter    Pain    Stage 5 chronic kidney disease not on chronic dialysis    Hypercalcemia    Preop cardiovascular exam

## 2022-02-20 NOTE — PROGRESS NOTE ADULT - PROBLEM SELECTOR PLAN 1
Will start Levemir 4u in the AM and continue coverage scale, continue monitoring FS and FU.  Suggest DC on Tradjenta 5mg daily, discontinue prior hypoglycemic agents/insulin, endo FU 4 weeks.

## 2022-02-21 NOTE — PROGRESS NOTE ADULT - ASSESSMENT
Assessment  DMT2: 71y Male with DM T2 with hyperglycemia, A1C 6.4%, was on insulin at home, started on low-dose basal insulin yesterday, blood sugars are stable and trending within acceptable range, no hypoglycemias, remains intubated and NPO in the icu.  Hypothyroidism: Patient has no history thyroid disease, was not on any thyroid supplements, subclinical with low-normal FT4 and lethargy, on synthroid 12.5 mcg IV daily, repeat TFTs show subclinical state, increased to synthroid 20mcg IV.  Hypercalcemia: Started on Sensipar 60mg daily, Ca improving.  CHF: on medications, stable, monitored.  HTN: Controlled,  on antihypertensive medications.  Parkinsons: on meds, monitored.  CKD: Monitor labs/BMP    john kelvin DAMON  [phone]

## 2022-02-21 NOTE — PROGRESS NOTE ADULT - SUBJECTIVE AND OBJECTIVE BOX
Remains intubated on levo  LAst HD was saturday 1 L was removed         VITAL:  T(C): , Max: 37.5 (02-20-22 @ 16:00)  T(F): , Max: 99.5 (02-20-22 @ 16:00)  HR: 111 (02-21-22 @ 15:30)  BP: --  BP(mean): --  RR: 11 (02-21-22 @ 15:30)  SpO2: 100% (02-21-22 @ 15:30)  Wt(kg): --      PHYSICAL EXAM:  General: intubated   HEENT: ET tube   Lungs:CTA-b/l  Heart: RRR S1S2  Abdomen: Soft, NTND  Ext:  pedal edema b/l  :  johnson  Vascular Access- R chest wall permcath and L AVF     LABS:                        8.4    9.48  )-----------( 131      ( 21 Feb 2022 00:22 )             26.6     Na(132)/K(3.9)/Cl(99)/HCO3(23)/BUN(26)/Cr(2.46)Glu(141)/Ca(8.4)/Mg(1.9)/PO4(4.0)    02-21 @ 00:21  Na(132)/K(3.6)/Cl(99)/HCO3(22)/BUN(21)/Cr(1.64)Glu(149)/Ca(7.8)/Mg(1.7)/PO4(2.7)    02-20 @ 00:46  Na(129)/K(3.3)/Cl(97)/HCO3(19)/BUN(38)/Cr(2.38)Glu(157)/Ca(7.8)/Mg(1.9)/PO4(3.3)    02-19 @ 01:06          M w/ HTN, DM2, CAD-CABG, Parkinson's disease, and advanced CKD, 10/21/21 p/w LE swelling, c/b COVID; now newly ESRD-HD as of this admission    (1)Renal - ESRD-HD last HD saturday   (2)Anemia- due to GI bleed   (3)Failure to thrive and confusion   (4) CV- on norepi and midodrine    RECOMMEND  (1)Plan for HD tomorrow am first shift   (2wean pressors as able  (3) PO Rdahao for C diff     D/w MICU team       Maria Ines Watts MD  [facility]  Office: [phone]

## 2022-02-21 NOTE — PROGRESS NOTE ADULT - PROBLEM SELECTOR PLAN 1
Will continue Levemir 4u in the AM as well as coverage scale. Will continue monitoring FS and FU.  Suggest DC on Tradjenta 5mg daily, discontinue prior hypoglycemic agents/insulin, endo FU 4 weeks.

## 2022-02-21 NOTE — PROGRESS NOTE ADULT - SUBJECTIVE AND OBJECTIVE BOX
MICU PROGRESS NOTE    INTERVAL HPI/OVERNIGHT EVENTS:    SUBJECTIVE:     OBJECTIVE:    VITAL SIGNS:  ICU Vital Signs Last 24 Hrs  T(C): 35.5 (21 Feb 2022 07:00), Max: 37.5 (20 Feb 2022 16:00)  T(F): 95.9 (21 Feb 2022 07:00), Max: 99.5 (20 Feb 2022 16:00)  HR: 55 (21 Feb 2022 07:00) (55 - 118)  BP: --  BP(mean): --  ABP: 121/49 (21 Feb 2022 07:00) (102/46 - 135/62)  ABP(mean): 75 (21 Feb 2022 07:00) (62 - 89)  RR: 16 (21 Feb 2022 07:00) (10 - 39)  SpO2: 100% (21 Feb 2022 07:00) (98% - 100%)      VENTS:  Mode: AC/ CMV (Assist Control/ Continuous Mandatory Ventilation), RR (machine): 8, TV (machine): 400, FiO2: 21, PEEP: 5, ITime: 1, MAP: 8, PIP: 24    I&O:    02-20 @ 07:01  -  02-21 @ 07:00  --------------------------------------------------------  IN: 1291.8 mL / OUT: 950 mL / NET: 341.8 mL        PHYSICAL EXAM:  GENERAL: NAD, conversant  CHEST/LUNG: Clear to auscultation bilaterally; No crackles, rhonchi, wheezing, or rubs  HEART: Regular rate and rhythm; No murmurs, rubs, or gallops  ABDOMEN: Soft, Nontender, Nondistended; Bowel sounds present  EXTREMITIES:  2+ Peripheral Pulses, No clubbing, cyanosis, or edema  SKIN: No rashes or lesions  NERVOUS SYSTEM:  Alert & Oriented, Good concentration      LINES:                                       MEDICATIONS:  MEDICATIONS  (STANDING):  atorvastatin 80 milliGRAM(s) Oral at bedtime  carbidopa/levodopa  25/100 2.5 Tablet(s) Oral <User Schedule>  carbidopa/levodopa  25/100 2 Tablet(s) Oral <User Schedule>  chlorhexidine 0.12% Liquid 15 milliLiter(s) Oral Mucosa every 12 hours  chlorhexidine 4% Liquid 1 Application(s) Topical <User Schedule>  chlorhexidine 4% Liquid 1 Application(s) Topical <User Schedule>  dexMEDEtomidine Infusion 0.4 MICROgram(s)/kG/Hr (9.64 mL/Hr) IV Continuous <Continuous>  dextrose 40% Gel 15 Gram(s) Oral once  dextrose 5%. 1000 milliLiter(s) (50 mL/Hr) IV Continuous <Continuous>  dextrose 5%. 1000 milliLiter(s) (100 mL/Hr) IV Continuous <Continuous>  dextrose 50% Injectable 25 Gram(s) IV Push once  dextrose 50% Injectable 12.5 Gram(s) IV Push once  dextrose 50% Injectable 25 Gram(s) IV Push once  diVALproex Sprinkle 250 milliGRAM(s) Oral three times a day  doxazosin 1 milliGRAM(s) Oral at bedtime  DULoxetine 20 milliGRAM(s) Oral daily  folic acid 1 milliGRAM(s) Oral daily  glucagon  Injectable 1 milliGRAM(s) IntraMuscular once  insulin detemir injectable (LEVEMIR) 4 Unit(s) SubCutaneous before breakfast  insulin lispro (ADMELOG) corrective regimen sliding scale   SubCutaneous every 6 hours  levothyroxine Injectable 30 MICROGram(s) IV Push at bedtime  memantine 5 milliGRAM(s) Oral at bedtime  midodrine 15 milliGRAM(s) Oral every 8 hours  mirtazapine 7.5 milliGRAM(s) Oral daily  mupirocin 2% Nasal 1 Application(s) Both Nostrils two times a day  Nephro-celeste 1 Tablet(s) Oral daily  pantoprazole  Injectable 40 milliGRAM(s) IV Push every 12 hours  piperacillin/tazobactam IVPB.. 3.375 Gram(s) IV Intermittent every 8 hours  QUEtiapine 50 milliGRAM(s) Oral at bedtime  trihexyphenidyl 2 milliGRAM(s) Oral three times a day  vancomycin    Solution 125 milliGRAM(s) Oral every 6 hours    MEDICATIONS  (PRN):  sodium chloride 0.9% lock flush 10 milliLiter(s) IV Push every 1 hour PRN Pre/post blood products, medications, blood draw, and to maintain line patency      ALLERGIES:  Allergies    adhesives (Rash)  latex (Urticaria)  No Known Drug Allergies    Intolerances        LABS:                        8.4    9.48  )-----------( 131      ( 21 Feb 2022 00:22 )             26.6     02-21    132<L>  |  99  |  26<H>  ----------------------------<  141<H>  3.9   |  23  |  2.46<H>    Ca    8.4      21 Feb 2022 00:21  Phos  4.0     02-21  Mg     1.9     02-21    TPro  5.6<L>  /  Alb  2.0<L>  /  TBili  0.6  /  DBili  x   /  AST  16  /  ALT  <5<L>  /  AlkPhos  76  02-21          CAPILLARY BLOOD GLUCOSE      POCT Blood Glucose.: 121 mg/dL (21 Feb 2022 05:19)      RADIOLOGY & ADDITIONAL TESTS: Reviewed. MICU PROGRESS NOTE    INTERVAL HPI/OVERNIGHT EVENTS:  No acute events    SUBJECTIVE:   N/A    OBJECTIVE:    VITAL SIGNS:  ICU Vital Signs Last 24 Hrs  T(C): 35.5 (21 Feb 2022 07:00), Max: 37.5 (20 Feb 2022 16:00)  T(F): 95.9 (21 Feb 2022 07:00), Max: 99.5 (20 Feb 2022 16:00)  HR: 55 (21 Feb 2022 07:00) (55 - 118)  BP: --  BP(mean): --  ABP: 121/49 (21 Feb 2022 07:00) (102/46 - 135/62)  ABP(mean): 75 (21 Feb 2022 07:00) (62 - 89)  RR: 16 (21 Feb 2022 07:00) (10 - 39)  SpO2: 100% (21 Feb 2022 07:00) (98% - 100%)      VENTS:  Mode: AC/ CMV (Assist Control/ Continuous Mandatory Ventilation), RR (machine): 8, TV (machine): 400, FiO2: 21, PEEP: 5, ITime: 1, MAP: 8, PIP: 24    I&O:    02-20 @ 07:01  -  02-21 @ 07:00  --------------------------------------------------------  IN: 1291.8 mL / OUT: 950 mL / NET: 341.8 mL        PHYSICAL EXAM:  GENERAL: Intubated, sedated  CHEST/LUNG: Clear to auscultation bilaterally; No crackles, rhonchi, wheezing, or rubs  HEART: Regular rate and rhythm; No murmurs, rubs, or gallops  ABDOMEN: Soft, Nontender, Nondistended; Bowel sounds present  EXTREMITIES:  minimal edema  SKIN: No rashes or lesions  NERVOUS SYSTEM:  Alert & Oriented x0, wiggles toes when calves pinched, did not follow commands in AM  LINES:                                       MEDICATIONS:  MEDICATIONS  (STANDING):  atorvastatin 80 milliGRAM(s) Oral at bedtime  carbidopa/levodopa  25/100 2.5 Tablet(s) Oral <User Schedule>  carbidopa/levodopa  25/100 2 Tablet(s) Oral <User Schedule>  chlorhexidine 0.12% Liquid 15 milliLiter(s) Oral Mucosa every 12 hours  chlorhexidine 4% Liquid 1 Application(s) Topical <User Schedule>  chlorhexidine 4% Liquid 1 Application(s) Topical <User Schedule>  dexMEDEtomidine Infusion 0.4 MICROgram(s)/kG/Hr (9.64 mL/Hr) IV Continuous <Continuous>  dextrose 40% Gel 15 Gram(s) Oral once  dextrose 5%. 1000 milliLiter(s) (50 mL/Hr) IV Continuous <Continuous>  dextrose 5%. 1000 milliLiter(s) (100 mL/Hr) IV Continuous <Continuous>  dextrose 50% Injectable 25 Gram(s) IV Push once  dextrose 50% Injectable 12.5 Gram(s) IV Push once  dextrose 50% Injectable 25 Gram(s) IV Push once  diVALproex Sprinkle 250 milliGRAM(s) Oral three times a day  doxazosin 1 milliGRAM(s) Oral at bedtime  DULoxetine 20 milliGRAM(s) Oral daily  folic acid 1 milliGRAM(s) Oral daily  glucagon  Injectable 1 milliGRAM(s) IntraMuscular once  insulin detemir injectable (LEVEMIR) 4 Unit(s) SubCutaneous before breakfast  insulin lispro (ADMELOG) corrective regimen sliding scale   SubCutaneous every 6 hours  levothyroxine Injectable 30 MICROGram(s) IV Push at bedtime  memantine 5 milliGRAM(s) Oral at bedtime  midodrine 15 milliGRAM(s) Oral every 8 hours  mirtazapine 7.5 milliGRAM(s) Oral daily  mupirocin 2% Nasal 1 Application(s) Both Nostrils two times a day  Nephro-celeste 1 Tablet(s) Oral daily  pantoprazole  Injectable 40 milliGRAM(s) IV Push every 12 hours  piperacillin/tazobactam IVPB.. 3.375 Gram(s) IV Intermittent every 8 hours  QUEtiapine 50 milliGRAM(s) Oral at bedtime  trihexyphenidyl 2 milliGRAM(s) Oral three times a day  vancomycin    Solution 125 milliGRAM(s) Oral every 6 hours    MEDICATIONS  (PRN):  sodium chloride 0.9% lock flush 10 milliLiter(s) IV Push every 1 hour PRN Pre/post blood products, medications, blood draw, and to maintain line patency      ALLERGIES:  Allergies    adhesives (Rash)  latex (Urticaria)  No Known Drug Allergies    Intolerances        LABS:                        8.4    9.48  )-----------( 131      ( 21 Feb 2022 00:22 )             26.6     02-21    132<L>  |  99  |  26<H>  ----------------------------<  141<H>  3.9   |  23  |  2.46<H>    Ca    8.4      21 Feb 2022 00:21  Phos  4.0     02-21  Mg     1.9     02-21    TPro  5.6<L>  /  Alb  2.0<L>  /  TBili  0.6  /  DBili  x   /  AST  16  /  ALT  <5<L>  /  AlkPhos  76  02-21          CAPILLARY BLOOD GLUCOSE      POCT Blood Glucose.: 121 mg/dL (21 Feb 2022 05:19)      RADIOLOGY & ADDITIONAL TESTS: Reviewed.

## 2022-02-21 NOTE — PROGRESS NOTE ADULT - ASSESSMENT
70yo M w/ PMHx of CAD (s/p CABG 2019), CKD (unknown stage), DM2, Parkinson's Disease, HTN, depression presents with new onset acute heart failure exacerbation, NSTEMI, started on hep gtt, developed bl RP bleed, acute anemia. sp MICU course for hemorrhagic shock, 10/28 sp IR embolization l4 and l5 lumbar artery. sp permacath and AVF.    # chronic systolic and diastolic heart failure  # ESRD, new HD, AVF not matured, sp permacath  # Atrial flutter  # Parkinson's disease   # delirium  # CAD (coronary artery disease)  # HTN  # DM2 (diabetes mellitus, type 2)  # FTT sp PEG, dislodged and replaced  # C diff colitis  # UGIB due to GDA territory ulcer   # acute resp failure    IR embolization if re bleeds  PPI gtt  NPO  PO vanco for c diff  zosyn  sputum growing serratia and proteus  titrate pressors as tolerated  diuresis  appreciate ICU care    DVT ppx hold     [facility]  [phone]

## 2022-02-21 NOTE — PROGRESS NOTE ADULT - ATTENDING COMMENTS
70 y/o M w/CAD and CKD initially admitted for NSTEMI and acute heart failure w/hospital course c/b hemorrhagic shock secondary to RP bleed s/p IR embolization and ABBY on CKD now w/ESRD requiring initiation of HD now with acute blood loss anemia and likely hemorrhagic shock secondary to UGIB s/p EGD with cauterization. Patient remained intubated post-procedure for acute respiratory failure.     - Sedation as needed goal RASS -1 to 0  - Continue home antidepressants  - Daily SAT/SBT  - Titrate pressors as needed goal MAP >= 65  - Trial of diuretics  - Continue abx, PO vanc for possible C. Diff. Specimen was indeterminate  - PPI, IR if rebleeds  - Trend CBC, transfuse PRN for hgb < 7  - HD as per renal  - Free water restriction for hyponatremia  - DNR/DNI

## 2022-02-21 NOTE — PROGRESS NOTE ADULT - SUBJECTIVE AND OBJECTIVE BOX
Patient is a 71y old  Male who presents with a chief complaint of leg swelling (21 Feb 2022 08:32)      SUBJECTIVE / OVERNIGHT EVENTS:    Patient seen and examined.   intubated sedated.   on pressors    Vital Signs Last 24 Hrs  T(C): 36.4 (21 Feb 2022 11:00), Max: 37.5 (20 Feb 2022 16:00)  T(F): 97.5 (21 Feb 2022 11:00), Max: 99.5 (20 Feb 2022 16:00)  HR: 64 (21 Feb 2022 11:00) (55 - 115)  BP: --  BP(mean): --  RR: 22 (21 Feb 2022 11:00) (10 - 39)  SpO2: 100% (21 Feb 2022 11:00) (99% - 100%)  I&O's Summary    20 Feb 2022 07:01  -  21 Feb 2022 07:00  --------------------------------------------------------  IN: 1291.8 mL / OUT: 950 mL / NET: 341.8 mL    21 Feb 2022 07:01  -  21 Feb 2022 12:10  --------------------------------------------------------  IN: 132.4 mL / OUT: 0 mL / NET: 132.4 mL        PE:  GENERAL: NAD, frail, intubated  HEAD:  Atraumatic, Normocephalic  CHEST/LUNG: Coarse BS  HEART: Regular rate and rhythm; no murmur  ABDOMEN: Soft, Nontender, distended; Bowel sounds present, PEG   EXTREMITIES:  2+ Peripheral Pulses, UE bl edema, + 1 le edema  NEURO: sedated    LABS:                        8.4    9.48  )-----------( 131      ( 21 Feb 2022 00:22 )             26.6     02-21    132<L>  |  99  |  26<H>  ----------------------------<  141<H>  3.9   |  23  |  2.46<H>    Ca    8.4      21 Feb 2022 00:21  Phos  4.0     02-21  Mg     1.9     02-21    TPro  5.6<L>  /  Alb  2.0<L>  /  TBili  0.6  /  DBili  x   /  AST  16  /  ALT  <5<L>  /  AlkPhos  76  02-21      CAPILLARY BLOOD GLUCOSE      POCT Blood Glucose.: 96 mg/dL (21 Feb 2022 11:43)  POCT Blood Glucose.: 121 mg/dL (21 Feb 2022 05:19)  POCT Blood Glucose.: 139 mg/dL (21 Feb 2022 00:09)  POCT Blood Glucose.: 141 mg/dL (20 Feb 2022 18:17)  POCT Blood Glucose.: 126 mg/dL (20 Feb 2022 12:15)            RADIOLOGY & ADDITIONAL TESTS:    Imaging Personally Reviewed:  [x] YES  [ ] NO    Consultant(s) Notes Reviewed:  [x] YES  [ ] NO    MEDICATIONS  (STANDING):  atorvastatin 80 milliGRAM(s) Oral at bedtime  carbidopa/levodopa  25/100 2.5 Tablet(s) Oral <User Schedule>  carbidopa/levodopa  25/100 2 Tablet(s) Oral <User Schedule>  chlorhexidine 0.12% Liquid 15 milliLiter(s) Oral Mucosa every 12 hours  chlorhexidine 4% Liquid 1 Application(s) Topical <User Schedule>  chlorhexidine 4% Liquid 1 Application(s) Topical <User Schedule>  dexMEDEtomidine Infusion 0.4 MICROgram(s)/kG/Hr (9.64 mL/Hr) IV Continuous <Continuous>  dextrose 40% Gel 15 Gram(s) Oral once  dextrose 5%. 1000 milliLiter(s) (50 mL/Hr) IV Continuous <Continuous>  dextrose 5%. 1000 milliLiter(s) (100 mL/Hr) IV Continuous <Continuous>  dextrose 50% Injectable 25 Gram(s) IV Push once  dextrose 50% Injectable 12.5 Gram(s) IV Push once  dextrose 50% Injectable 25 Gram(s) IV Push once  diVALproex Sprinkle 250 milliGRAM(s) Oral three times a day  doxazosin 1 milliGRAM(s) Oral at bedtime  DULoxetine 20 milliGRAM(s) Oral daily  folic acid 1 milliGRAM(s) Oral daily  furosemide   Injectable 80 milliGRAM(s) IV Push once  glucagon  Injectable 1 milliGRAM(s) IntraMuscular once  insulin detemir injectable (LEVEMIR) 4 Unit(s) SubCutaneous before breakfast  insulin lispro (ADMELOG) corrective regimen sliding scale   SubCutaneous every 6 hours  levothyroxine Injectable 30 MICROGram(s) IV Push at bedtime  memantine 5 milliGRAM(s) Oral at bedtime  midodrine 20 milliGRAM(s) Oral every 8 hours  mirtazapine 7.5 milliGRAM(s) Oral daily  mupirocin 2% Nasal 1 Application(s) Both Nostrils two times a day  Nephro-celeste 1 Tablet(s) Oral daily  norepinephrine Infusion 0.05 MICROgram(s)/kG/Min (9.04 mL/Hr) IV Continuous <Continuous>  pantoprazole  Injectable 40 milliGRAM(s) IV Push every 12 hours  piperacillin/tazobactam IVPB.. 3.375 Gram(s) IV Intermittent every 8 hours  QUEtiapine 50 milliGRAM(s) Oral at bedtime  trihexyphenidyl 2 milliGRAM(s) Oral three times a day  vancomycin    Solution 125 milliGRAM(s) Oral every 6 hours    MEDICATIONS  (PRN):  sodium chloride 0.9% lock flush 10 milliLiter(s) IV Push every 1 hour PRN Pre/post blood products, medications, blood draw, and to maintain line patency      Care Discussed with Consultants/Other Providers [x] YES  [ ] NO    HEALTH ISSUES - PROBLEM Dx:  Acute heart failure    Atrial flutter    DM2 (diabetes mellitus, type 2)    CAD (coronary artery disease)    Parkinsons disease    Acute on chronic renal failure    NSTEMI (non-ST elevation myocardial infarction)    Hypertension    Acute kidney injury superimposed on CKD    Anemia    Hypothyroidism    Delirium    Advanced care planning/counseling discussion    Palliative care status    Palliative care encounter    Pain    Stage 5 chronic kidney disease not on chronic dialysis    Hypercalcemia    Preop cardiovascular exam

## 2022-02-21 NOTE — PROGRESS NOTE ADULT - SUBJECTIVE AND OBJECTIVE BOX
Chief complaint  Patient is a 71y old  Male who presents with a chief complaint of leg swelling (21 Feb 2022 12:10)   Review of systems  Patient remains intubated, no hypoglycemic episodes.    Labs and Fingersticks  CAPILLARY BLOOD GLUCOSE      POCT Blood Glucose.: 96 mg/dL (21 Feb 2022 11:43)  POCT Blood Glucose.: 121 mg/dL (21 Feb 2022 05:19)  POCT Blood Glucose.: 139 mg/dL (21 Feb 2022 00:09)  POCT Blood Glucose.: 141 mg/dL (20 Feb 2022 18:17)      Anion Gap, Serum: 10 (02-21 @ 00:21)  Anion Gap, Serum: 11 (02-20 @ 00:46)      Calcium, Total Serum: 8.4 (02-21 @ 00:21)  Calcium, Total Serum: 7.8 *L* (02-20 @ 00:46)  Albumin, Serum: 2.0 *L* (02-21 @ 00:21)  Albumin, Serum: 1.4 *L* (02-20 @ 00:46)    Alanine Aminotransferase (ALT/SGPT): <5 *L* (02-21 @ 00:21)  Alanine Aminotransferase (ALT/SGPT): <5 *L* (02-20 @ 00:46)  Alkaline Phosphatase, Serum: 76 (02-21 @ 00:21)  Alkaline Phosphatase, Serum: 81 (02-20 @ 00:46)  Aspartate Aminotransferase (AST/SGOT): 16 (02-21 @ 00:21)  Aspartate Aminotransferase (AST/SGOT): 17 (02-20 @ 00:46)        02-21    132<L>  |  99  |  26<H>  ----------------------------<  141<H>  3.9   |  23  |  2.46<H>    Ca    8.4      21 Feb 2022 00:21  Phos  4.0     02-21  Mg     1.9     02-21    TPro  5.6<L>  /  Alb  2.0<L>  /  TBili  0.6  /  DBili  x   /  AST  16  /  ALT  <5<L>  /  AlkPhos  76  02-21                        8.4    9.48  )-----------( 131      ( 21 Feb 2022 00:22 )             26.6     Medications  MEDICATIONS  (STANDING):  atorvastatin 80 milliGRAM(s) Oral at bedtime  carbidopa/levodopa  25/100 2.5 Tablet(s) Oral <User Schedule>  carbidopa/levodopa  25/100 2 Tablet(s) Oral <User Schedule>  chlorhexidine 0.12% Liquid 15 milliLiter(s) Oral Mucosa every 12 hours  chlorhexidine 4% Liquid 1 Application(s) Topical <User Schedule>  chlorhexidine 4% Liquid 1 Application(s) Topical <User Schedule>  dexMEDEtomidine Infusion 0.4 MICROgram(s)/kG/Hr (9.64 mL/Hr) IV Continuous <Continuous>  dextrose 40% Gel 15 Gram(s) Oral once  dextrose 5%. 1000 milliLiter(s) (50 mL/Hr) IV Continuous <Continuous>  dextrose 5%. 1000 milliLiter(s) (100 mL/Hr) IV Continuous <Continuous>  dextrose 50% Injectable 25 Gram(s) IV Push once  dextrose 50% Injectable 12.5 Gram(s) IV Push once  dextrose 50% Injectable 25 Gram(s) IV Push once  diVALproex Sprinkle 250 milliGRAM(s) Oral three times a day  doxazosin 1 milliGRAM(s) Oral at bedtime  DULoxetine 20 milliGRAM(s) Oral daily  folic acid 1 milliGRAM(s) Oral daily  glucagon  Injectable 1 milliGRAM(s) IntraMuscular once  insulin detemir injectable (LEVEMIR) 4 Unit(s) SubCutaneous before breakfast  insulin lispro (ADMELOG) corrective regimen sliding scale   SubCutaneous every 6 hours  levothyroxine Injectable 30 MICROGram(s) IV Push at bedtime  memantine 5 milliGRAM(s) Oral at bedtime  midodrine 20 milliGRAM(s) Oral every 8 hours  mirtazapine 7.5 milliGRAM(s) Oral daily  mupirocin 2% Nasal 1 Application(s) Both Nostrils two times a day  Nephro-celeste 1 Tablet(s) Oral daily  norepinephrine Infusion 0.05 MICROgram(s)/kG/Min (9.04 mL/Hr) IV Continuous <Continuous>  pantoprazole  Injectable 40 milliGRAM(s) IV Push every 12 hours  piperacillin/tazobactam IVPB.. 3.375 Gram(s) IV Intermittent every 8 hours  QUEtiapine 50 milliGRAM(s) Oral at bedtime  trihexyphenidyl 2 milliGRAM(s) Oral three times a day  vancomycin    Solution 125 milliGRAM(s) Oral every 6 hours      Physical Exam  Vital Signs Last 12 Hrs  T(F): 98.6 (02-21-22 @ 12:00), Max: 98.6 (02-21-22 @ 12:00)  HR: 106 (02-21-22 @ 13:30) (55 - 113)  BP: --  BP(mean): --  RR: 21 (02-21-22 @ 13:30) (11 - 23)  SpO2: 100% (02-21-22 @ 13:30) (99% - 100%)

## 2022-02-21 NOTE — PROGRESS NOTE ADULT - SUBJECTIVE AND OBJECTIVE BOX
Chief Complaint:  Patient is a 71y old  Male who presents with a chief complaint of leg swelling (21 Feb 2022 13:57)      Date of service 02-21-22 @ 14:28      Interval Events:   Patient seen and examined.   Remains intubated in MICU.  Having intermittent episodes of melena.   Hgb stable at this time.     Hospital Medications:  atorvastatin 80 milliGRAM(s) Oral at bedtime  carbidopa/levodopa  25/100 2.5 Tablet(s) Oral <User Schedule>  carbidopa/levodopa  25/100 2 Tablet(s) Oral <User Schedule>  chlorhexidine 0.12% Liquid 15 milliLiter(s) Oral Mucosa every 12 hours  chlorhexidine 4% Liquid 1 Application(s) Topical <User Schedule>  chlorhexidine 4% Liquid 1 Application(s) Topical <User Schedule>  dexMEDEtomidine Infusion 0.4 MICROgram(s)/kG/Hr IV Continuous <Continuous>  dextrose 40% Gel 15 Gram(s) Oral once  dextrose 5%. 1000 milliLiter(s) IV Continuous <Continuous>  dextrose 5%. 1000 milliLiter(s) IV Continuous <Continuous>  dextrose 50% Injectable 25 Gram(s) IV Push once  dextrose 50% Injectable 12.5 Gram(s) IV Push once  dextrose 50% Injectable 25 Gram(s) IV Push once  diVALproex Sprinkle 250 milliGRAM(s) Oral three times a day  doxazosin 1 milliGRAM(s) Oral at bedtime  DULoxetine 20 milliGRAM(s) Oral daily  folic acid 1 milliGRAM(s) Oral daily  glucagon  Injectable 1 milliGRAM(s) IntraMuscular once  insulin detemir injectable (LEVEMIR) 4 Unit(s) SubCutaneous before breakfast  insulin lispro (ADMELOG) corrective regimen sliding scale   SubCutaneous every 6 hours  levothyroxine Injectable 30 MICROGram(s) IV Push at bedtime  memantine 5 milliGRAM(s) Oral at bedtime  midodrine 20 milliGRAM(s) Oral every 8 hours  mirtazapine 7.5 milliGRAM(s) Oral daily  mupirocin 2% Nasal 1 Application(s) Both Nostrils two times a day  Nephro-celeste 1 Tablet(s) Oral daily  norepinephrine Infusion 0.05 MICROgram(s)/kG/Min IV Continuous <Continuous>  pantoprazole  Injectable 40 milliGRAM(s) IV Push every 12 hours  piperacillin/tazobactam IVPB.. 3.375 Gram(s) IV Intermittent every 8 hours  QUEtiapine 50 milliGRAM(s) Oral at bedtime  sodium chloride 0.9% lock flush 10 milliLiter(s) IV Push every 1 hour PRN  trihexyphenidyl 2 milliGRAM(s) Oral three times a day  vancomycin    Solution 125 milliGRAM(s) Oral every 6 hours        Review of Systems:  unable to obtain, intubated       PHYSICAL EXAM:   Vital Signs:  Vital Signs Last 24 Hrs  T(C): 37 (21 Feb 2022 12:00), Max: 37.5 (20 Feb 2022 16:00)  T(F): 98.6 (21 Feb 2022 12:00), Max: 99.5 (20 Feb 2022 16:00)  HR: 106 (21 Feb 2022 13:30) (55 - 115)  BP: --  BP(mean): --  RR: 21 (21 Feb 2022 13:30) (10 - 37)  SpO2: 100% (21 Feb 2022 13:30) (99% - 100%)  Daily     Daily       PHYSICAL EXAM:     GENERAL:  Appears stated age, well-groomed, well-nourished, no distress  HEENT:  NC/AT,  conjunctivae anicteric, clear and pink,   NECK: supple, trachea midline  CHEST:  Full & symmetric excursion, no increased effort, breath sounds clear  HEART:  Regular rhythm, no JVD  ABDOMEN:  Soft, non-tender, non-distended, normoactive bowel sounds,  no masses , no hepatosplenomegaly, +gastrostomy   EXTREMITIES:  no cyanosis,clubbing or edema  SKIN:  No rash, erythema, or, ecchymoses, no jaundice  NEURO:  non-focal, no asterixis  RECTAL: Deferred      LABS Personally reviewed by me:                        8.4    9.48  )-----------( 131      ( 21 Feb 2022 00:22 )             26.6     Mean Cell Volume: 97.8 fl (02-21-22 @ 00:22)    02-21    132<L>  |  99  |  26<H>  ----------------------------<  141<H>  3.9   |  23  |  2.46<H>    Ca    8.4      21 Feb 2022 00:21  Phos  4.0     02-21  Mg     1.9     02-21    TPro  5.6<L>  /  Alb  2.0<L>  /  TBili  0.6  /  DBili  x   /  AST  16  /  ALT  <5<L>  /  AlkPhos  76  02-21    LIVER FUNCTIONS - ( 21 Feb 2022 00:21 )  Alb: 2.0 g/dL / Pro: 5.6 g/dL / ALK PHOS: 76 U/L / ALT: <5 U/L / AST: 16 U/L / GGT: x                                       8.4    9.48  )-----------( 131      ( 21 Feb 2022 00:22 )             26.6                         9.0    14.62 )-----------( 119      ( 20 Feb 2022 00:46 )             27.3                         8.8    16.17 )-----------( 119      ( 19 Feb 2022 01:05 )             26.8       Imaging personally reviewed by me:

## 2022-02-21 NOTE — PROGRESS NOTE ADULT - ASSESSMENT
72yo M w/ PMHx of HTN,  CAD (s/p CABG 2019), CKD (unknown stage), DM2, Parkinson's Disease, depression, dementia  who presented on 10/21/21 p/w LE swelling, was found to be in new onset acute HF exacerbation and NSTEMI s/p hep gtt, s/p MICU course for hemorrhagic shock 2/2 b/l RP bleed s/p IR embolization l4 and l5 lumbar artery on 10/28. Hospital course c/b COVID & new ESRD-HD as of this admission, s/p PermCath and AVF (maturing), new PEG. Further c/b acute blood loss anemia 2/2 UGIB 2/2 duodenal ulcer w/ active bleeding vessel s/p endoscopy on 2/16 with epi and cauterization, remains intubated post procedure and admitted to MICU for monitoring and management.    Neuro   - Off propofol, and wean Precedex in prep for extubation  - Avoid haldo/zyprexa for agitation due to hx of PD; may use Ativan 0.25 prn if agitated when weaning sedation    #Parkinson disease   - c/w Sinemet 25/100 2.5 TAB 6 am and 2pm  - c/w Sinemet 25/100 2 TAB 10pm   - c/w Artane 2mg TID     #Depression   - c/w Cymbalta 20mg qd    - c/w Divalproex 250mg tid  - c/w Seroquel 50mg qhs  - c/w Remeron 7.5mg qd    #Dementia   - c/w Namenda 5mg qhs    CV  #Shock state: possible sepsis vs. vasoplegia  - Wean Levo as tolerated;   - C/w midodrine 15mg q8 hours with hold for bradycardia   - Treat sepsis empirically    #Heart failure & CAD s/p CABG 2019  - Hold metoprolol 25mg given hypotension and presser requirements   - cont to hold Lasix iso GIB  - c/w atorvastatin 80mg qhs    PULM  - Recent COVID infection, PCR still positive, no isolation  - Intubated prior to endoscopy, plan to wean and extubate after another session of dialysis   - PS trial when weaning sedation, and trial of extubation if able to tolerate    GI  #Acute blood loss anemia 2/2 UGIB 2/2 duodenal ulcer w/ active bleeding vessel   - S/p Endoscopy w/duodenal ulcer s/p tx w/ epi and cautery on 2/16  - If re-bleeds will require IR intervention  - Transitioned from PPI ggt to IV BID PPI   - Restart TF as tolerated  - H&H stable 2/20; Trend CBC daily and keep active T&S     #C-diff  Positive on 2/13  - C/w vanco 125mg q6h via PEG x 10 days      #ESRD on HD via permacath (placed 2/10/22 R IJ); New LUE AVF  - Minimal urine output currently  - HD per nephro  - C/w nephro nina qd   - Replete lytes as appropriate  - c/w Sensipar 60mg qd for hypercalcemia    #BPH   - C/w doxazosin     ENDO  #DMT2  - FS q 6 hrs with ISS q 6 hrs while on TF or NPO  - ENDO: Suggest DC on Tradjenta 5mg daily, discontinue prior hypoglycemic agents/insulin, endo FU 4 weeks.    #Hypothyroidism.   - continue IV levothyroxine to 30 mg due to subclinical hypothyroidism and hypotension    HEME   # Acute blood loss 2/2 UGIB 2/2 duodenal ulcer   - Hold AC or DVT ppx  - H&H stable, trend CBC qd and active T&S    #Hx of b/l RP bleed during this admission   -s/p Abdominal Angiography and Embolization on 10/29/2021 by Interventional radiology of l4and l5 lumbar artery     ID  # Sepsis   - 2/17 Bcx, NGTD   - Empiric coverage with Zosyn  - MRSA swab neg (MSSA positive, started on mupirocin nasal ointment for 10 days)  - C/w vancomycin 125 mg q 6 hrs via PEG for C. diff    PPX  - GI ppx ; PPI gtt --> IV BID on 2/19  - DVT ppx ; compression devices    Ethics  - DNR/DNI  - Discussed with family and they want no reintubation if the patient goes into respiratory failure after extubation they would want a comfort care approach; would like to be present for extubation.  70yo M w/ PMHx of HTN,  CAD (s/p CABG 2019), CKD (unknown stage), DM2, Parkinson's Disease, depression, dementia  who presented on 10/21/21 p/w LE swelling, was found to be in new onset acute HF exacerbation and NSTEMI s/p hep gtt, s/p MICU course for hemorrhagic shock 2/2 b/l RP bleed s/p IR embolization l4 and l5 lumbar artery on 10/28. Hospital course c/b COVID & new ESRD-HD as of this admission, s/p PermCath and AVF (maturing), new PEG. Further c/b acute blood loss anemia 2/2 UGIB 2/2 duodenal ulcer w/ active bleeding vessel s/p endoscopy on 2/16 with epi and cauterization, remains intubated post procedure and admitted to MICU for monitoring and management.    Neuro   - Wean Precedex in prep for extubation  - Avoid haldo/zyprexa for agitation due to hx of PD; may use Ativan 0.25 prn if agitated when weaning sedation    #Parkinson disease   - c/w Sinemet 25/100 2.5 TAB 6 am and 2pm  - c/w Sinemet 25/100 2 TAB 10pm   - c/w Artane 2mg TID     #Depression   - c/w Cymbalta 20mg qd    - c/w Divalproex 250mg tid  - c/w Seroquel 50mg qhs  - c/w Remeron 7.5mg qd    #Dementia   - c/w Namenda 5mg qhs    CV  #Shock state: possible sepsis vs. vasoplegia  - Wean Levo as tolerated;   - Increase midodrine from 15mg q8 hours to 20q8h with hold for bradycardia   - Treat sepsis empirically    #Heart failure & CAD s/p CABG 2019  - Hold metoprolol 25mg given hypotension and presser requirements   - 2/21: restart lasix with 80mg IV x1  - c/w atorvastatin 80mg qhs    PULM  - Recent COVID infection, PCR still positive, no isolation  - Intubated prior to endoscopy, plan to wean and extubate after another session of dialysis   - PS trial when weaning sedation, and trial of extubation if able to tolerate    GI  #Acute blood loss anemia 2/2 UGIB 2/2 duodenal ulcer w/ active bleeding vessel   - S/p Endoscopy w/duodenal ulcer s/p tx w/ epi and cautery on 2/16  - If re-bleeds will require IR intervention  - C/w IV BID PPI   - Restart TF as tolerated  - H&H stable 2/20; Trend CBC daily and keep active T&S  2/21: last melena was 2/20 am     #C-diff  Positive on 2/13  - C/w vanco 125mg q6h via PEG x 10 days      #ESRD on HD via permacath (placed 2/10/22 R IJ); New LUE AVF  - Minimal urine output currently  - HD per nephro  - C/w nephro nina qd   - Replete lytes as appropriate  - c/w Sensipar 60mg qd for hypercalcemia    #BPH   - C/w doxazosin   -Javier (2/21) for urinary retention    ENDO  #DMT2  - FS q 6 hrs with ISS q 6 hrs while on TF or NPO  - ENDO: Suggest DC on Tradjenta 5mg daily, discontinue prior hypoglycemic agents/insulin, endo FU 4 weeks.    #Hypothyroidism.   - continue IV levothyroxine to 30 mg due to subclinical hypothyroidism and hypotension    HEME   # Acute blood loss 2/2 UGIB 2/2 duodenal ulcer   - Hold AC or DVT ppx  - H&H stable, trend CBC qd and active T&S    #Hx of b/l RP bleed during this admission   -s/p Abdominal Angiography and Embolization on 10/29/2021 by Interventional radiology of l4and l5 lumbar artery     ID  # Sepsis   - 2/17 Bcx, NGTD   - Empiric coverage with Zosyn  - MRSA swab neg (MSSA positive, started on mupirocin nasal ointment for 10 days)  - C/w vancomycin 125 mg q 6 hrs via PEG for C. diff    PPX  - GI ppx ; PPI gtt --> IV BID on 2/19  - DVT ppx ; compression devices    Ethics  - DNR/DNI  - Discussed with family and they want no reintubation if the patient goes into respiratory failure after extubation they would want a comfort care approach; would like to be present for extubation.

## 2022-02-21 NOTE — PROGRESS NOTE ADULT - ASSESSMENT
71 M w volume overload, GI consulted for drop in hgb    1. GI Bleeding  -s/p endoscopy 2/16 which showed spurting duodenal ulcer; ulcer injected with epi for hemostasis, cautery and hemospray used.  -hgb 8.4 today, intermittent episodes of melena  -high dose PPI  -may resume tube feeds  -if bleeding occurs again would need embolization by IR  -monitor for GI bleeding  -trend CBCs  -family considering more palliative goals of care    2. ABBY on CKD per renal  -on HD via permacath per renal, midodrine during dialysis  -AVF not functional   -s/p tunnelled HD catheter insertion by IR 2/10    3. RP bleed  -s/p Abdominal Angiography and Embolization on 10/29/2021 by Interventional radiology of l4and l5 lumbar artery     3. C-diff  -on vanco     Attending supervision statement: I have personally seen and examined the patient. I fully participated in the care of this patient. I have made amendments to the documentation where necessary, and agree with the history, physical exam, and plan as outlined by the ACP.      [facility]  Gastroenterology and Hepatology  [address] [place], [place]  Office: [phone]  Cell: [phone]

## 2022-02-22 NOTE — PROGRESS NOTE ADULT - SUBJECTIVE AND OBJECTIVE BOX
Patient is a 71y old  Male who presents with a chief complaint of leg swelling (22 Feb 2022 14:50)      SUBJECTIVE / OVERNIGHT EVENTS:    Patient seen and examined. intubated. cannot provide ros.      Vital Signs Last 24 Hrs  T(C): 36.3 (22 Feb 2022 13:20), Max: 36.6 (21 Feb 2022 20:00)  T(F): 97.3 (22 Feb 2022 13:20), Max: 97.9 (21 Feb 2022 20:00)  HR: 129 (22 Feb 2022 14:00) (56 - 132)  BP: --  BP(mean): --  RR: 28 (22 Feb 2022 14:00) (10 - 29)  SpO2: 97% (22 Feb 2022 14:00) (94% - 100%)  I&O's Summary    21 Feb 2022 07:01  -  22 Feb 2022 07:00  --------------------------------------------------------  IN: 1593.4 mL / OUT: 1315 mL / NET: 278.4 mL    22 Feb 2022 07:01  -  22 Feb 2022 14:57  --------------------------------------------------------  IN: 946.5 mL / OUT: 705 mL / NET: 241.5 mL        PE:  GENERAL: NAD, frail, intubated  HEAD:  Atraumatic, Normocephalic  CHEST/LUNG: Coarse BS  HEART: Regular rate and rhythm; no murmur  ABDOMEN: Soft, Nontender, distended; Bowel sounds present, PEG   EXTREMITIES:  2+ Peripheral Pulses, UE bl edema, + 1 le edema  NEURO: cannot access    LABS:                        8.5    8.76  )-----------( 161      ( 22 Feb 2022 00:13 )             26.5     02-22    130<L>  |  97  |  30<H>  ----------------------------<  75  3.6   |  22  |  2.93<H>    Ca    8.4      22 Feb 2022 00:13  Phos  4.2     02-22  Mg     1.9     02-22    TPro  5.6<L>  /  Alb  1.8<L>  /  TBili  0.5  /  DBili  x   /  AST  21  /  ALT  <5<L>  /  AlkPhos  78  02-22      CAPILLARY BLOOD GLUCOSE      POCT Blood Glucose.: 126 mg/dL (22 Feb 2022 11:41)  POCT Blood Glucose.: 89 mg/dL (22 Feb 2022 05:05)  POCT Blood Glucose.: 72 mg/dL (22 Feb 2022 00:07)  POCT Blood Glucose.: 115 mg/dL (21 Feb 2022 17:29)  POCT Blood Glucose.: 47 mg/dL (21 Feb 2022 17:04)  POCT Blood Glucose.: 39 mg/dL (21 Feb 2022 17:03)            RADIOLOGY & ADDITIONAL TESTS:    Imaging Personally Reviewed:  [x] YES  [ ] NO    Consultant(s) Notes Reviewed:  [x] YES  [ ] NO    MEDICATIONS  (STANDING):  atorvastatin 80 milliGRAM(s) Oral at bedtime  carbidopa/levodopa  25/100 2.5 Tablet(s) Oral <User Schedule>  carbidopa/levodopa  25/100 2 Tablet(s) Oral <User Schedule>  chlorhexidine 0.12% Liquid 15 milliLiter(s) Oral Mucosa every 12 hours  chlorhexidine 4% Liquid 1 Application(s) Topical <User Schedule>  chlorhexidine 4% Liquid 1 Application(s) Topical <User Schedule>  dexMEDEtomidine Infusion 0.4 MICROgram(s)/kG/Hr (9.64 mL/Hr) IV Continuous <Continuous>  dextrose 10%. 1000 milliLiter(s) (50 mL/Hr) IV Continuous <Continuous>  dextrose 40% Gel 15 Gram(s) Oral once  dextrose 5%. 1000 milliLiter(s) (50 mL/Hr) IV Continuous <Continuous>  dextrose 5%. 1000 milliLiter(s) (100 mL/Hr) IV Continuous <Continuous>  dextrose 50% Injectable 25 Gram(s) IV Push once  dextrose 50% Injectable 12.5 Gram(s) IV Push once  dextrose 50% Injectable 25 Gram(s) IV Push once  diVALproex Sprinkle 250 milliGRAM(s) Oral three times a day  doxazosin 1 milliGRAM(s) Oral at bedtime  DULoxetine 20 milliGRAM(s) Oral daily  folic acid 1 milliGRAM(s) Oral daily  glucagon  Injectable 1 milliGRAM(s) IntraMuscular once  insulin lispro (ADMELOG) corrective regimen sliding scale   SubCutaneous every 6 hours  levothyroxine Injectable 30 MICROGram(s) IV Push at bedtime  memantine 5 milliGRAM(s) Oral at bedtime  midodrine 20 milliGRAM(s) Oral every 8 hours  mirtazapine 7.5 milliGRAM(s) Oral daily  mupirocin 2% Nasal 1 Application(s) Both Nostrils two times a day  Nephro-celeste 1 Tablet(s) Oral daily  norepinephrine Infusion 0.05 MICROgram(s)/kG/Min (9.04 mL/Hr) IV Continuous <Continuous>  pantoprazole  Injectable 40 milliGRAM(s) IV Push every 12 hours  piperacillin/tazobactam IVPB.. 3.375 Gram(s) IV Intermittent every 8 hours  QUEtiapine 50 milliGRAM(s) Oral at bedtime  trihexyphenidyl 2 milliGRAM(s) Oral three times a day  vancomycin    Solution 125 milliGRAM(s) Oral every 6 hours    MEDICATIONS  (PRN):  sodium chloride 0.9% lock flush 10 milliLiter(s) IV Push every 1 hour PRN Pre/post blood products, medications, blood draw, and to maintain line patency      Care Discussed with Consultants/Other Providers [x] YES  [ ] NO    HEALTH ISSUES - PROBLEM Dx:  Acute heart failure    Atrial flutter    DM2 (diabetes mellitus, type 2)    CAD (coronary artery disease)    Parkinsons disease    Acute on chronic renal failure    NSTEMI (non-ST elevation myocardial infarction)    Hypertension    Acute kidney injury superimposed on CKD    Anemia    Hypothyroidism    Delirium    Advanced care planning/counseling discussion    Palliative care status    Palliative care encounter    Pain    Stage 5 chronic kidney disease not on chronic dialysis    Hypercalcemia    Preop cardiovascular exam

## 2022-02-22 NOTE — PROGRESS NOTE ADULT - SUBJECTIVE AND OBJECTIVE BOX
Chief Complaint:  Patient is a 71y old  Male who presents with a chief complaint of leg swelling (22 Feb 2022 11:57)      Date of service 02-22-22 @ 14:29      Interval Events:   Patient seen and examined.   Patient remains intubated and sedated in MICU.   Last episode of melena 2/20.    Hospital Medications:  atorvastatin 80 milliGRAM(s) Oral at bedtime  carbidopa/levodopa  25/100 2.5 Tablet(s) Oral <User Schedule>  carbidopa/levodopa  25/100 2 Tablet(s) Oral <User Schedule>  chlorhexidine 0.12% Liquid 15 milliLiter(s) Oral Mucosa every 12 hours  chlorhexidine 4% Liquid 1 Application(s) Topical <User Schedule>  chlorhexidine 4% Liquid 1 Application(s) Topical <User Schedule>  dexMEDEtomidine Infusion 0.4 MICROgram(s)/kG/Hr IV Continuous <Continuous>  dextrose 10%. 1000 milliLiter(s) IV Continuous <Continuous>  dextrose 40% Gel 15 Gram(s) Oral once  dextrose 5%. 1000 milliLiter(s) IV Continuous <Continuous>  dextrose 5%. 1000 milliLiter(s) IV Continuous <Continuous>  dextrose 50% Injectable 25 Gram(s) IV Push once  dextrose 50% Injectable 12.5 Gram(s) IV Push once  dextrose 50% Injectable 25 Gram(s) IV Push once  diVALproex Sprinkle 250 milliGRAM(s) Oral three times a day  doxazosin 1 milliGRAM(s) Oral at bedtime  DULoxetine 20 milliGRAM(s) Oral daily  folic acid 1 milliGRAM(s) Oral daily  glucagon  Injectable 1 milliGRAM(s) IntraMuscular once  insulin lispro (ADMELOG) corrective regimen sliding scale   SubCutaneous every 6 hours  levothyroxine Injectable 30 MICROGram(s) IV Push at bedtime  memantine 5 milliGRAM(s) Oral at bedtime  midodrine 20 milliGRAM(s) Oral every 8 hours  mirtazapine 7.5 milliGRAM(s) Oral daily  mupirocin 2% Nasal 1 Application(s) Both Nostrils two times a day  Nephro-celeste 1 Tablet(s) Oral daily  norepinephrine Infusion 0.05 MICROgram(s)/kG/Min IV Continuous <Continuous>  pantoprazole  Injectable 40 milliGRAM(s) IV Push every 12 hours  piperacillin/tazobactam IVPB.. 3.375 Gram(s) IV Intermittent every 8 hours  QUEtiapine 50 milliGRAM(s) Oral at bedtime  sodium chloride 0.9% lock flush 10 milliLiter(s) IV Push every 1 hour PRN  trihexyphenidyl 2 milliGRAM(s) Oral three times a day  vancomycin    Solution 125 milliGRAM(s) Oral every 6 hours        Review of Systems:  unable to obtain, intubated    PHYSICAL EXAM:   Vital Signs:  Vital Signs Last 24 Hrs  T(C): 36.3 (22 Feb 2022 13:20), Max: 36.6 (21 Feb 2022 20:00)  T(F): 97.3 (22 Feb 2022 13:20), Max: 97.9 (21 Feb 2022 20:00)  HR: 129 (22 Feb 2022 14:00) (56 - 132)  BP: --  BP(mean): --  RR: 28 (22 Feb 2022 14:00) (10 - 29)  SpO2: 97% (22 Feb 2022 14:00) (94% - 100%)  Daily     Daily       PHYSICAL EXAM:     GENERAL:  Appears stated age, well-groomed, well-nourished, no distress  HEENT:  NC/AT,  conjunctivae anicteric, clear and pink,   NECK: supple, trachea midline  CHEST:  Full & symmetric excursion, no increased effort, breath sounds clear  HEART:  Regular rhythm, no JVD  ABDOMEN:  Soft, non-tender, non-distended, normoactive bowel sounds,  no masses , no hepatosplenomegaly, +gastrostomy   EXTREMITIES:  no cyanosis,clubbing or edema  SKIN:  No rash, erythema, or, ecchymoses, no jaundice  NEURO:   non-focal, no asterixis  RECTAL: Deferred      LABS Personally reviewed by me:                        8.5    8.76  )-----------( 161      ( 22 Feb 2022 00:13 )             26.5     Mean Cell Volume: 96.7 fl (02-22-22 @ 00:13)    02-22    130<L>  |  97  |  30<H>  ----------------------------<  75  3.6   |  22  |  2.93<H>    Ca    8.4      22 Feb 2022 00:13  Phos  4.2     02-22  Mg     1.9     02-22    TPro  5.6<L>  /  Alb  1.8<L>  /  TBili  0.5  /  DBili  x   /  AST  21  /  ALT  <5<L>  /  AlkPhos  78  02-22    LIVER FUNCTIONS - ( 22 Feb 2022 00:13 )  Alb: 1.8 g/dL / Pro: 5.6 g/dL / ALK PHOS: 78 U/L / ALT: <5 U/L / AST: 21 U/L / GGT: x                                       8.5    8.76  )-----------( 161      ( 22 Feb 2022 00:13 )             26.5                         8.4    9.48  )-----------( 131      ( 21 Feb 2022 00:22 )             26.6                         9.0    14.62 )-----------( 119      ( 20 Feb 2022 00:46 )             27.3       Imaging personally reviewed by me:

## 2022-02-22 NOTE — PROGRESS NOTE ADULT - ASSESSMENT
71 M w volume overload, GI consulted for drop in hgb    1. GI Bleeding  -s/p endoscopy 2/16 which showed spurting duodenal ulcer; ulcer injected with epi for hemostasis, cautery and hemospray used.  -hgb 8.5 today, last episode of melena 2/20  -high dose PPI  -may resume tube feeds  -if bleeding occurs again would need embolization by IR  -monitor for GI bleeding  -trend CBCs  -family considering more palliative goals of care    2. ABBY on CKD per renal  -on HD via permacath per renal, midodrine during dialysis  -AVF not functional   -s/p tunnelled HD catheter insertion by IR 2/10    3. RP bleed  -s/p Abdominal Angiography and Embolization on 10/29/2021 by Interventional radiology of l4and l5 lumbar artery     3. C-diff  -on vanco     Attending supervision statement: I have personally seen and examined the patient. I fully participated in the care of this patient. I have made amendments to the documentation where necessary, and agree with the history, physical exam, and plan as outlined by the ACP.      [facility]  Gastroenterology and Hepatology  [address] [place], [place]  Office: [phone]  Cell: [phone]

## 2022-02-22 NOTE — PROGRESS NOTE ADULT - ASSESSMENT
72yo M w/ PMHx of CAD (s/p CABG 2019), CKD (unknown stage), DM2, Parkinson's Disease, HTN, depression presents with new onset acute heart failure exacerbation, NSTEMI, started on hep gtt, developed bl RP bleed, acute anemia. sp MICU course for hemorrhagic shock, 10/28 sp IR embolization l4 and l5 lumbar artery. sp permacath and AVF.    # chronic systolic and diastolic heart failure  # ESRD, new HD, AVF not matured, sp permacath  # Atrial flutter  # Parkinson's disease   # delirium  # CAD (coronary artery disease)  # HTN  # DM2 (diabetes mellitus, type 2)  # FTT sp PEG, dislodged and replaced  # C diff colitis  # UGIB due to GDA territory ulcer   # acute resp failure    remains on ventilator, SBT  PPI gtt  NPO  PO vanco for c diff  zosyn  sputum growing serratia and proteus  titrate pressors as tolerated  diuresis  palliative fu  appreciate ICU care  W  DVT ppx hold AC    Dr. Pineda will be covering me tomorrow.  Please contact with any questions or concerns [phone].

## 2022-02-22 NOTE — PROGRESS NOTE ADULT - ATTENDING COMMENTS
Pt is a 72 yo M with h/o CAD (s/p CABG 2019), HTN, DM2, CKD, Parkinson's Disease, depression, dementia  who presented on 10/21/21 p/w LE swelling, was found to be in new onset acute HF exacerbation and NSTEMI s/p hep gtt, s/p MICU course for hemorrhagic shock 2 to b/l RP bleed s/p IR embolization l4 and l5 lumbar artery on 10/28. Hospital course complicated by COVID & new ESRD-HD as of this admission, s/p PermCath and AVF (maturing), new PEG. Further complicated by acute blood loss anemia 2 to UGIB 2 to duodenal ulcer w/ active bleeding vessel s/p endoscopy on 2/16 with epi and cauterization, remains intubated post procedure and admitted to MICU    Resp/ Social: Aggressive weaning pt was electively intubated and is DNR  ID: May dc Zosyn and cont Vanco for C. dif  CVS: Midodrine + Levophed to maintain MAP >65  HEME: Follow Hb  FEN: Cont enteral feeds  GI: GI f/u prn  Renal: HD as per Renal  Neuro: Minimize sedation  Psych: Cont Psych meds

## 2022-02-22 NOTE — PROGRESS NOTE ADULT - ASSESSMENT
70yo M w/ PMHx of HTN,  CAD (s/p CABG 2019), CKD (unknown stage), DM2, Parkinson's Disease, depression, dementia  who presented on 10/21/21 p/w LE swelling, was found to be in new onset acute HF exacerbation and NSTEMI s/p hep gtt, s/p MICU course for hemorrhagic shock 2/2 b/l RP bleed s/p IR embolization l4 and l5 lumbar artery on 10/28. Hospital course c/b COVID & new ESRD-HD as of this admission, s/p PermCath and AVF (maturing), new PEG. Further c/b acute blood loss anemia 2/2 UGIB 2/2 duodenal ulcer w/ active bleeding vessel s/p endoscopy on 2/16 with epi and cauterization, remains intubated post procedure and admitted to MICU for monitoring and management.    Neuro   - Wean Precedex in prep for extubation  - Avoid haldo/zyprexa for agitation due to hx of PD; may use Ativan 0.25 prn if agitated when weaning sedation    #Parkinson disease   - c/w Sinemet 25/100 2.5 TAB 6 am and 2pm  - c/w Sinemet 25/100 2 TAB 10pm   - c/w Artane 2mg TID     #Depression   - c/w Cymbalta 20mg qd    - c/w Divalproex 250mg tid  - c/w Seroquel 50mg qhs  - c/w Remeron 7.5mg qd    #Dementia   - c/w Namenda 5mg qhs    CV  #Shock state: possible sepsis vs. vasoplegia  - Wean Levo as tolerated;   - Increase midodrine from 15mg q8 hours to 20q8h with hold for bradycardia   - Treat sepsis empirically    #Heart failure & CAD s/p CABG 2019  - Hold metoprolol 25mg given hypotension and presser requirements   - 2/21: restart lasix with 80mg IV x1  - c/w atorvastatin 80mg qhs    PULM  - Recent COVID infection, PCR still positive, no isolation  - Intubated prior to endoscopy, plan to wean and extubate after another session of dialysis   - PS trial when weaning sedation, and trial of extubation if able to tolerate    GI  #Acute blood loss anemia 2/2 UGIB 2/2 duodenal ulcer w/ active bleeding vessel   - S/p Endoscopy w/duodenal ulcer s/p tx w/ epi and cautery on 2/16  - If re-bleeds will require IR intervention  - C/w IV BID PPI   - Restart TF as tolerated  - H&H stable 2/20; Trend CBC daily and keep active T&S  2/21: last melena was 2/20 am     #C-diff  Positive on 2/13  - C/w vanco 125mg q6h via PEG x 10 days      #ESRD on HD via permacath (placed 2/10/22 R IJ); New LUE AVF  - Minimal urine output currently  - HD per nephro  - C/w nephro nina qd   - Replete lytes as appropriate  - c/w Sensipar 60mg qd for hypercalcemia    #BPH   - C/w doxazosin   -Javier (2/21) for urinary retention    ENDO  #DMT2  - FS q 6 hrs with ISS q 6 hrs while on TF or NPO  - ENDO: Suggest DC on Tradjenta 5mg daily, discontinue prior hypoglycemic agents/insulin, endo FU 4 weeks.    #Hypothyroidism.   - continue IV levothyroxine to 30 mg due to subclinical hypothyroidism and hypotension    HEME   # Acute blood loss 2/2 UGIB 2/2 duodenal ulcer   - Hold AC or DVT ppx  - H&H stable, trend CBC qd and active T&S    #Hx of b/l RP bleed during this admission   -s/p Abdominal Angiography and Embolization on 10/29/2021 by Interventional radiology of l4and l5 lumbar artery     ID  # Sepsis   - 2/17 Bcx, NGTD   - Empiric coverage with Zosyn  - MRSA swab neg (MSSA positive, started on mupirocin nasal ointment for 10 days)  - C/w vancomycin 125 mg q 6 hrs via PEG for C. diff    PPX  - GI ppx ; PPI gtt --> IV BID on 2/19  - DVT ppx ; compression devices    Ethics  - DNR/DNI  - Discussed with family and they want no reintubation if the patient goes into respiratory failure after extubation they would want a comfort care approach; would like to be present for extubation.  70yo M w/ PMHx of HTN,  CAD (s/p CABG 2019), CKD (unknown stage), DM2, Parkinson's Disease, depression, dementia  who presented on 10/21/21 p/w LE swelling, was found to be in new onset acute HF exacerbation and NSTEMI s/p hep gtt, s/p MICU course for hemorrhagic shock 2/2 b/l RP bleed s/p IR embolization l4 and l5 lumbar artery on 10/28. Hospital course c/b COVID & new ESRD-HD as of this admission, s/p PermCath and AVF (maturing), new PEG. Further c/b acute blood loss anemia 2/2 UGIB 2/2 duodenal ulcer w/ active bleeding vessel s/p endoscopy on 2/16 with epi and cauterization, remains intubated post procedure and admitted to MICU for monitoring and management.    Neuro   - Wean Precedex in prep for extubation  - Avoid haldo/zyprexa for agitation due to hx of PD; may use Ativan 0.25 prn if agitated when weaning sedation    #Parkinson disease   - c/w Sinemet 25/100 2.5 TAB 6 am and 2pm  - c/w Sinemet 25/100 2 TAB 10pm   - c/w Artane 2mg TID     #Depression   - c/w Cymbalta 20mg qd    - c/w Divalproex 250mg tid  - c/w Seroquel 50mg qhs  - c/w Remeron 7.5mg qd       #Dementia   - c/w Namenda 5mg qhs    CV  #Shock state: possible sepsis vs. vasoplegia  - Wean Levo as tolerated;   - Increase midodrine from 15mg q8 hours to 20q8h with hold for bradycardia   - Treat sepsis empirically    #Heart failure & CAD s/p CABG 2019  - Hold metoprolol 25mg given hypotension and presser requirements   - 2/21: restart lasix with 80mg IV x1  - c/w atorvastatin 80mg qhs    PULM  - Recent COVID infection, PCR still positive, no isolation  - Intubated prior to endoscopy, plan to wean and extubate after another session of dialysis   - PS trial when weaning sedation, and trial of extubation if able to tolerate    GI  #Acute blood loss anemia 2/2 UGIB 2/2 duodenal ulcer w/ active bleeding vessel   - S/p Endoscopy w/duodenal ulcer s/p tx w/ epi and cautery on 2/16  - If re-bleeds will require IR intervention  - C/w IV BID PPI   - Restart TF as tolerated  - H&H stable 2/20; Trend CBC daily and keep active T&S  2/21: last melena was 2/20 am     #C-diff  Positive on 2/13  - C/w vanco 125mg q6h via PEG x 10 days      #ESRD on HD via permacath (placed 2/10/22 R IJ); New LUE AVF  - Minimal urine output currently  - HD per nephro  - C/w nephro nina qd   - Replete lytes as appropriate  - c/w Sensipar 60mg qd for hypercalcemia    #BPH   - C/w doxazosin   -Javier (2/21) for urinary retention    ENDO  #DMT2  - FS q 6 hrs with ISS q 6 hrs while on TF or NPO  - ENDO: Suggest DC on Tradjenta 5mg daily, discontinue prior hypoglycemic agents/insulin, endo FU 4 weeks.    #Hypothyroidism.   - continue IV levothyroxine to 30 mg due to subclinical hypothyroidism and hypotension    HEME   # Acute blood loss 2/2 UGIB 2/2 duodenal ulcer   - Hold AC or DVT ppx  - H&H stable, trend CBC qd and active T&S    #Hx of b/l RP bleed during this admission   -s/p Abdominal Angiography and Embolization on 10/29/2021 by Interventional radiology of l4and l5 lumbar artery     ID  # Sepsis   - 2/17 Bcx, NGTD   - Empiric coverage with Zosyn  - MRSA swab neg (MSSA positive, started on mupirocin nasal ointment for 10 days)  - C/w vancomycin 125 mg q 6 hrs via PEG for C. diff    PPX  - GI ppx ; PPI gtt --> IV BID on 2/19  - DVT ppx ; compression devices    Ethics  - DNR/DNI  - Discussed with family and they want no reintubation if the patient goes into respiratory failure after extubation they would want a comfort care approach; would like to be present for extubation.  72yo M w/ PMHx of HTN,  CAD (s/p CABG 2019), CKD (unknown stage), DM2, Parkinson's Disease, depression, dementia  who presented on 10/21/21 p/w LE swelling, was found to be in new onset acute HF exacerbation and NSTEMI s/p hep gtt, s/p MICU course for hemorrhagic shock 2/2 b/l RP bleed s/p IR embolization l4 and l5 lumbar artery on 10/28. Hospital course c/b COVID & new ESRD-HD as of this admission, s/p PermCath and AVF (maturing), new PEG. Further c/b acute blood loss anemia 2/2 UGIB 2/2 duodenal ulcer w/ active bleeding vessel s/p endoscopy on 2/16 with epi and cauterization, remains intubated post procedure and admitted to MICU for monitoring and management.    Neuro   - Wean Precedex in prep for extubation  - Avoid haldo/zyprexa for agitation due to hx of PD; may use Ativan 0.25 prn if agitated when weaning sedation    #Parkinson disease   - c/w Sinemet 25/100 2.5 TAB 6 am and 2pm  - c/w Sinemet 25/100 2 TAB 10pm   - c/w Artane 2mg TID     #Depression   - c/w Cymbalta 20mg qd    - c/w Divalproex 250mg tid  - c/w Seroquel 50mg qhs  - c/w Remeron 7.5mg qd       #Dementia   - c/w Namenda 5mg qhs    CV  #Shock state: possible sepsis vs. vasoplegia  - Wean Levo as tolerated;   - Increase midodrine from 15mg q8 hours to 20q8h with hold for bradycardia   - Treat sepsis empirically    #Heart failure & CAD s/p CABG 2019  - Hold metoprolol 25mg given hypotension and presser requirements   - 2/21: restart lasix with 80mg IV x1  - c/w atorvastatin 80mg qhs    PULM  - Recent COVID infection, PCR still positive, no isolation  - Intubated prior to endoscopy, plan to wean and extubate after another session of dialysis   - PS trial when weaning sedation, and trial of extubation if able to tolerate    GI  #Acute blood loss anemia 2/2 UGIB 2/2 duodenal ulcer w/ active bleeding vessel   - S/p Endoscopy w/duodenal ulcer s/p tx w/ epi and cautery on 2/16  - If re-bleeds will require IR intervention  - C/w IV BID PPI   - Restart TF as tolerated  - H&H stable 2/20; Trend CBC daily and keep active T&S  - last melena was 2/20 am -- no BM since then     #C-diff  Positive on 2/13   - C/w vanco 125mg q6h via PEG x 10 days 2/13 - 2/23      #ESRD on HD via permacath (placed 2/10/22 R IJ); New LUE AVF  - Minimal urine output currently  - HD per nephro  - C/w nephro nina qd   - Replete lytes as appropriate  - c/w Sensipar 60mg qd for hypercalcemia    #BPH   - C/w doxazosin   -Javier (2/21) for urinary retention    ENDO  #DMT2  - FS q 6 hrs with ISS q 6 hrs while on TF or NPO  - ENDO: Suggest DC on Tradjenta 5mg daily, discontinue prior hypoglycemic agents/insulin, endo FU 4 weeks.    #Hypothyroidism.   - continue IV levothyroxine to 30 mg due to subclinical hypothyroidism and hypotension    HEME   # Acute blood loss 2/2 UGIB 2/2 duodenal ulcer   - Hold AC or DVT ppx  - H&H stable, trend CBC qd and active T&S    #Hx of b/l RP bleed during this admission   -s/p Abdominal Angiography and Embolization on 10/29/2021 by Interventional radiology of l4and l5 lumbar artery     ID  # Sepsis   - 2/17 Bcx, NGTD   - Empiric coverage with Zosyn  - MRSA swab neg (MSSA positive, started on mupirocin nasal ointment for 10 days)  - C/w vancomycin 125 mg q 6 hrs via PEG for C. diff    PPX  - GI ppx ; PPI gtt --> IV BID on 2/19  - DVT ppx ; compression devices    Ethics  - DNR/DNI  - Discussed with family and they want no reintubation if the patient goes into respiratory failure after extubation they would want a comfort care approach; would like to be present for extubation.  70yo M w/ PMHx of HTN,  CAD (s/p CABG 2019), CKD (unknown stage), DM2, Parkinson's Disease, depression, dementia  who presented on 10/21/21 p/w LE swelling, was found to be in new onset acute HF exacerbation and NSTEMI s/p hep gtt, s/p MICU course for hemorrhagic shock 2/2 b/l RP bleed s/p IR embolization l4 and l5 lumbar artery on 10/28. Hospital course c/b COVID & new ESRD-HD as of this admission, s/p PermCath and AVF (maturing), new PEG. Further c/b acute blood loss anemia 2/2 UGIB 2/2 duodenal ulcer w/ active bleeding vessel s/p endoscopy on 2/16 with epi and cauterization, remains intubated post procedure and admitted to MICU for monitoring and management.    Neuro   - Wean Precedex in prep for extubation  - Avoid haldo/zyprexa for agitation due to hx of PD; may use Ativan 0.25 prn if agitated when weaning sedation  -When pt on medical floors he was confused but able to answer simple questions and identify family members    #Parkinson disease   - c/w Sinemet 25/100 2.5 TAB 6 am and 2pm  - c/w Sinemet 25/100 2 TAB 10pm   - c/w Artane 2mg TID     #Depression   - c/w Cymbalta 20mg qd    - c/w Divalproex 250mg tid  - c/w Seroquel 50mg qhs  - c/w Remeron 7.5mg qd       #Dementia   - c/w Namenda 5mg qhs    CV  #Shock state: possible sepsis vs. vasoplegia  Pressures labile, pt has been on and off levophed  - Wean Levo as tolerated;   - Midodrine 20q8h with hold for bradycardia   - Treat sepsis empirically    #Heart failure & CAD s/p CABG 2019  - Hold metoprolol 25mg given hypotension and presser requirements   - 2/21: restart lasix with 80mg IV x1  - c/w atorvastatin 80mg qhs    PULM  - Recent COVID infection, PCR still positive, no isolation  - Intubated prior to endoscopy, plan to wean and extubate after another session of dialysis   - PS trial when weaning sedation, and trial of extubation if able to tolerate    GI  #Acute blood loss anemia 2/2 UGIB 2/2 duodenal ulcer w/ active bleeding vessel   - S/p Endoscopy w/duodenal ulcer s/p tx w/ epi and cautery on 2/16  - If re-bleeds will require IR intervention  - C/w IV BID PPI   - Restart TF as tolerated  - H&H stable 2/20; Trend CBC daily and keep active T&S  - last melena was 2/20 am -- no BM since then     #C-diff  Positive on 2/13   - C/w vanco 125mg q6h via PEG x 10 days 2/13 - 2/23    Renal   #ESRD on HD via permacath (placed 2/10/22 R IJ); New LUE AVF  - Minimal urine output currently  - HD per nephro  - C/w nephro nina qd   - Replete lytes as appropriate  - c/w Sensipar 60mg qd for hypercalcemia  - On 2/22 did poorly with HD could only remove 500 cc of planned 2L , became tachycardic    #BPH   - C/w doxazosin   -Javier (2/21) for urinary retention  [ ] bladder scan q8h    ENDO  #DMT2  - ENDO: Suggest DC on Tradjenta 5mg daily, discontinue prior hypoglycemic agents/insulin, endo FU 4 weeks.    #Hypoglycemia  -on D10  - If FSG improve can DC D10 (starting TFs today)    #Hypothyroidism.   - continue IV levothyroxine to 30 mg due to subclinical hypothyroidism and hypotension    HEME   # Acute blood loss 2/2 UGIB 2/2 duodenal ulcer   - Hold AC or DVT ppx  - H&H stable, trend CBC qd and active T&S    #Hx of b/l RP bleed during this admission   -s/p Abdominal Angiography and Embolization on 10/29/2021 by Interventional radiology of l4and l5 lumbar artery     ID  # Sepsis   - 2/17 Bcx, NGTD   - 2/18 Sputum -Serratia and proteus, no PMNs  - Was started with empiric coverage with Zosyn 2/17 for leukocytosis and hypothermia. Pt continues to be hypothermic on abx.   - MRSA swab neg (MSSA positive, started on mupirocin nasal ointment for 10 days)      #C Diff  Positive on 2/13   - C/w vanco 125mg q6h via PEG x 10 days 2/13 - 2/23    PPX  - GI ppx ; PPI gtt --> IV BID on 2/19  - DVT ppx ; compression devices    Ethics  - DNR/DNI  - Discussed with family and they want no reintubation if the patient goes into respiratory failure after extubation they would want a comfort care approach; would like to be present for extubation.   Plan for family meeting 2/24 @ 10:30 AM with palliative

## 2022-02-22 NOTE — PROGRESS NOTE ADULT - PROBLEM SELECTOR PLAN 1
Will continue coverage scale for now. Will continue monitoring FS and FU.  Suggest DC on Tradjenta 5mg daily, discontinue prior hypoglycemic agents/insulin, endo FU 4 weeks.

## 2022-02-22 NOTE — PROGRESS NOTE ADULT - SUBJECTIVE AND OBJECTIVE BOX
Remains intubated, on levo   HD earlier this am, was tachycardic, minimal fluid removal       VITAL:  T(C): , Max: 36.6 (02-21-22 @ 20:00)  T(F): , Max: 97.9 (02-21-22 @ 20:00)  HR: 68 (02-22-22 @ 15:00)  BP: --  BP(mean): --  RR: 18 (02-22-22 @ 15:00)  SpO2: 100% (02-22-22 @ 15:00)  Wt(kg): --      PHYSICAL EXAM:  General: intubated  HEENT: MMM  Lungs:CTA-b/l  Heart: RRR S1S2  Abdomen: Soft, NTND  Ext:  pedal edema b/l  : No  johnson  Vascular Access- R chest wall permcath and L AVF       LABS:                        8.5    8.76  )-----------( 161      ( 22 Feb 2022 00:13 )             26.5 `    Na(130)/K(3.6)/Cl(97)/HCO3(22)/BUN(30)/Cr(2.93)Glu(75)/Ca(8.4)/Mg(1.9)/PO4(4.2)    02-22 @ 00:13  Na(132)/K(3.9)/Cl(99)/HCO3(23)/BUN(26)/Cr(2.46)Glu(141)/Ca(8.4)/Mg(1.9)/PO4(4.0)    02-21 @ 00:21  Na(132)/K(3.6)/Cl(99)/HCO3(22)/BUN(21)/Cr(1.64)Glu(149)/Ca(7.8)/Mg(1.7)/PO4(2.7)    02-20 @ 00:46        M w/ HTN, DM2, CAD-CABG, Parkinson's disease, and advanced CKD, 10/21/21 p/w LE swelling, c/b COVID; now newly ESRD-HD as of this admission    (1)Renal - ESRD-HD   (2)Anemia- due to GI bleed   (3)Failure to thrive and confusion   (4) CV- on norepi and midodrine    RECOMMEND  (1)Plan for HD Thursday   (2wean pressors as able  (3) PO Vanco for C diff     Maria Ines Watts MD  [facility]  Office: [phone]

## 2022-02-22 NOTE — PROGRESS NOTE ADULT - ASSESSMENT
Assessment  DMT2: 71y Male with DM T2 with hyperglycemia, A1C 6.4%, was on insulin at home, was on low-dose basal insulin and coverage, Levemir DC'd s/p hypoglycemic episode yesterday, blood sugars improving today, patient remains intubated and NPO in the icu, on D10%  Hypothyroidism: Patient has no history thyroid disease, was not on any thyroid supplements, subclinical with low-normal FT4 and lethargy, on synthroid 12.5 mcg IV daily, repeat TFTs show subclinical state, increased to synthroid 20mcg IV.  Hypercalcemia: Started on Sensipar 60mg daily, Ca improving.  CHF: on medications, stable, monitored.  HTN: Controlled,  on antihypertensive medications.  Parkinsons: on meds, monitored.  CKD: Monitor labs/BMP    john kelvin DAMON  [phone]

## 2022-02-22 NOTE — PROGRESS NOTE ADULT - SUBJECTIVE AND OBJECTIVE BOX
Chief complaint  Patient is a 71y old  Male who presents with a chief complaint of leg swelling (22 Feb 2022 14:29)   Review of systems  Patient in bed, looks comfortable, had hypoglycemic episode yesterday.    Labs and Fingersticks  CAPILLARY BLOOD GLUCOSE      POCT Blood Glucose.: 126 mg/dL (22 Feb 2022 11:41)  POCT Blood Glucose.: 89 mg/dL (22 Feb 2022 05:05)  POCT Blood Glucose.: 72 mg/dL (22 Feb 2022 00:07)  POCT Blood Glucose.: 115 mg/dL (21 Feb 2022 17:29)  POCT Blood Glucose.: 47 mg/dL (21 Feb 2022 17:04)  POCT Blood Glucose.: 39 mg/dL (21 Feb 2022 17:03)      Anion Gap, Serum: 11 (02-22 @ 00:13)  Anion Gap, Serum: 10 (02-21 @ 00:21)      Calcium, Total Serum: 8.4 (02-22 @ 00:13)  Calcium, Total Serum: 8.4 (02-21 @ 00:21)  Albumin, Serum: 1.8 *L* (02-22 @ 00:13)  Albumin, Serum: 2.0 *L* (02-21 @ 00:21)    Alanine Aminotransferase (ALT/SGPT): <5 *L* (02-22 @ 00:13)  Alanine Aminotransferase (ALT/SGPT): <5 *L* (02-21 @ 00:21)  Alkaline Phosphatase, Serum: 78 (02-22 @ 00:13)  Alkaline Phosphatase, Serum: 76 (02-21 @ 00:21)  Aspartate Aminotransferase (AST/SGOT): 21 (02-22 @ 00:13)  Aspartate Aminotransferase (AST/SGOT): 16 (02-21 @ 00:21)        02-22    130<L>  |  97  |  30<H>  ----------------------------<  75  3.6   |  22  |  2.93<H>    Ca    8.4      22 Feb 2022 00:13  Phos  4.2     02-22  Mg     1.9     02-22    TPro  5.6<L>  /  Alb  1.8<L>  /  TBili  0.5  /  DBili  x   /  AST  21  /  ALT  <5<L>  /  AlkPhos  78  02-22                        8.5    8.76  )-----------( 161      ( 22 Feb 2022 00:13 )             26.5     Medications  MEDICATIONS  (STANDING):  atorvastatin 80 milliGRAM(s) Oral at bedtime  carbidopa/levodopa  25/100 2.5 Tablet(s) Oral <User Schedule>  carbidopa/levodopa  25/100 2 Tablet(s) Oral <User Schedule>  chlorhexidine 0.12% Liquid 15 milliLiter(s) Oral Mucosa every 12 hours  chlorhexidine 4% Liquid 1 Application(s) Topical <User Schedule>  chlorhexidine 4% Liquid 1 Application(s) Topical <User Schedule>  dexMEDEtomidine Infusion 0.4 MICROgram(s)/kG/Hr (9.64 mL/Hr) IV Continuous <Continuous>  dextrose 10%. 1000 milliLiter(s) (50 mL/Hr) IV Continuous <Continuous>  dextrose 40% Gel 15 Gram(s) Oral once  dextrose 5%. 1000 milliLiter(s) (50 mL/Hr) IV Continuous <Continuous>  dextrose 5%. 1000 milliLiter(s) (100 mL/Hr) IV Continuous <Continuous>  dextrose 50% Injectable 25 Gram(s) IV Push once  dextrose 50% Injectable 12.5 Gram(s) IV Push once  dextrose 50% Injectable 25 Gram(s) IV Push once  diVALproex Sprinkle 250 milliGRAM(s) Oral three times a day  doxazosin 1 milliGRAM(s) Oral at bedtime  DULoxetine 20 milliGRAM(s) Oral daily  folic acid 1 milliGRAM(s) Oral daily  glucagon  Injectable 1 milliGRAM(s) IntraMuscular once  insulin lispro (ADMELOG) corrective regimen sliding scale   SubCutaneous every 6 hours  levothyroxine Injectable 30 MICROGram(s) IV Push at bedtime  memantine 5 milliGRAM(s) Oral at bedtime  midodrine 20 milliGRAM(s) Oral every 8 hours  mirtazapine 7.5 milliGRAM(s) Oral daily  mupirocin 2% Nasal 1 Application(s) Both Nostrils two times a day  Nephro-celeste 1 Tablet(s) Oral daily  norepinephrine Infusion 0.05 MICROgram(s)/kG/Min (9.04 mL/Hr) IV Continuous <Continuous>  pantoprazole  Injectable 40 milliGRAM(s) IV Push every 12 hours  piperacillin/tazobactam IVPB.. 3.375 Gram(s) IV Intermittent every 8 hours  QUEtiapine 50 milliGRAM(s) Oral at bedtime  trihexyphenidyl 2 milliGRAM(s) Oral three times a day  vancomycin    Solution 125 milliGRAM(s) Oral every 6 hours      Physical Exam  General: Patient comfortable in bed  Vital Signs Last 12 Hrs  T(F): 97.3 (02-22-22 @ 13:20), Max: 97.7 (02-22-22 @ 04:00)  HR: 129 (02-22-22 @ 14:00) (57 - 132)  BP: --  BP(mean): --  RR: 28 (02-22-22 @ 14:00) (11 - 28)  SpO2: 97% (02-22-22 @ 14:00) (97% - 100%)  Neck: No palpable thyroid nodules.  CVS: S1S2, No murmurs  Respiratory: No wheezing, no crepitations  GI: Abdomen soft, bowel sounds positive  Musculoskeletal:  edema lower extremities.   Skin: No skin rashes, no ecchymosis    Diagnostics

## 2022-02-22 NOTE — PROGRESS NOTE ADULT - SUBJECTIVE AND OBJECTIVE BOX
MICU PROGRESS NOTE    INTERVAL HPI/OVERNIGHT EVENTS:    SUBJECTIVE:     OBJECTIVE:    VITAL SIGNS:  ICU Vital Signs Last 24 Hrs  T(C): 35.8 (22 Feb 2022 09:50), Max: 37 (21 Feb 2022 12:00)  T(F): 96.4 (22 Feb 2022 09:50), Max: 98.6 (21 Feb 2022 12:00)  HR: 127 (22 Feb 2022 11:40) (56 - 127)  BP: --  BP(mean): --  ABP: 117/56 (22 Feb 2022 11:40) (80/47 - 170/69)  ABP(mean): 75 (22 Feb 2022 11:40) (57 - 106)  RR: 16 (22 Feb 2022 11:40) (10 - 29)  SpO2: 100% (22 Feb 2022 11:40) (94% - 100%)      VENTS:  Mode: AC/ CMV (Assist Control/ Continuous Mandatory Ventilation), RR (machine): 8, TV (machine): 400, FiO2: 21, PEEP: 5, ITime: 1, MAP: 8, PIP: 22    I&O:    02-21 @ 07:01 - 02-22 @ 07:00  --------------------------------------------------------  IN: 1593.4 mL / OUT: 1315 mL / NET: 278.4 mL    02-22 @ 07:01 - 02-22 @ 11:58  --------------------------------------------------------  IN: 439.2 mL / OUT: 205 mL / NET: 234.2 mL        PHYSICAL EXAM:  GENERAL: NAD, conversant  CHEST/LUNG: Clear to auscultation bilaterally; No crackles, rhonchi, wheezing, or rubs  HEART: Regular rate and rhythm; No murmurs, rubs, or gallops  ABDOMEN: Soft, Nontender, Nondistended; Bowel sounds present  EXTREMITIES:  2+ Peripheral Pulses, No clubbing, cyanosis, or edema  SKIN: No rashes or lesions  NERVOUS SYSTEM:  Alert & Oriented, Good concentration      LINES:                                       MEDICATIONS:  MEDICATIONS  (STANDING):  atorvastatin 80 milliGRAM(s) Oral at bedtime  carbidopa/levodopa  25/100 2.5 Tablet(s) Oral <User Schedule>  carbidopa/levodopa  25/100 2 Tablet(s) Oral <User Schedule>  chlorhexidine 0.12% Liquid 15 milliLiter(s) Oral Mucosa every 12 hours  chlorhexidine 4% Liquid 1 Application(s) Topical <User Schedule>  chlorhexidine 4% Liquid 1 Application(s) Topical <User Schedule>  dexMEDEtomidine Infusion 0.4 MICROgram(s)/kG/Hr (9.64 mL/Hr) IV Continuous <Continuous>  dextrose 10%. 1000 milliLiter(s) (50 mL/Hr) IV Continuous <Continuous>  dextrose 40% Gel 15 Gram(s) Oral once  dextrose 5%. 1000 milliLiter(s) (50 mL/Hr) IV Continuous <Continuous>  dextrose 5%. 1000 milliLiter(s) (100 mL/Hr) IV Continuous <Continuous>  dextrose 50% Injectable 25 Gram(s) IV Push once  dextrose 50% Injectable 12.5 Gram(s) IV Push once  dextrose 50% Injectable 25 Gram(s) IV Push once  diVALproex Sprinkle 250 milliGRAM(s) Oral three times a day  doxazosin 1 milliGRAM(s) Oral at bedtime  DULoxetine 20 milliGRAM(s) Oral daily  folic acid 1 milliGRAM(s) Oral daily  glucagon  Injectable 1 milliGRAM(s) IntraMuscular once  insulin lispro (ADMELOG) corrective regimen sliding scale   SubCutaneous every 6 hours  levothyroxine Injectable 30 MICROGram(s) IV Push at bedtime  memantine 5 milliGRAM(s) Oral at bedtime  midodrine 20 milliGRAM(s) Oral every 8 hours  mirtazapine 7.5 milliGRAM(s) Oral daily  mupirocin 2% Nasal 1 Application(s) Both Nostrils two times a day  Nephro-celeste 1 Tablet(s) Oral daily  norepinephrine Infusion 0.05 MICROgram(s)/kG/Min (9.04 mL/Hr) IV Continuous <Continuous>  pantoprazole  Injectable 40 milliGRAM(s) IV Push every 12 hours  piperacillin/tazobactam IVPB.. 3.375 Gram(s) IV Intermittent every 8 hours  QUEtiapine 50 milliGRAM(s) Oral at bedtime  trihexyphenidyl 2 milliGRAM(s) Oral three times a day  vancomycin    Solution 125 milliGRAM(s) Oral every 6 hours    MEDICATIONS  (PRN):  sodium chloride 0.9% lock flush 10 milliLiter(s) IV Push every 1 hour PRN Pre/post blood products, medications, blood draw, and to maintain line patency      ALLERGIES:  Allergies    adhesives (Rash)  latex (Urticaria)  No Known Drug Allergies    Intolerances        LABS:                        8.5    8.76  )-----------( 161      ( 22 Feb 2022 00:13 )             26.5     02-22    130<L>  |  97  |  30<H>  ----------------------------<  75  3.6   |  22  |  2.93<H>    Ca    8.4      22 Feb 2022 00:13  Phos  4.2     02-22  Mg     1.9     02-22    TPro  5.6<L>  /  Alb  1.8<L>  /  TBili  0.5  /  DBili  x   /  AST  21  /  ALT  <5<L>  /  AlkPhos  78  02-22          CAPILLARY BLOOD GLUCOSE      POCT Blood Glucose.: 126 mg/dL (22 Feb 2022 11:41)      RADIOLOGY & ADDITIONAL TESTS: Reviewed. MICU PROGRESS NOTE    INTERVAL HPI/OVERNIGHT EVENTS:    SUBJECTIVE:     OBJECTIVE:    VITAL SIGNS:  ICU Vital Signs Last 24 Hrs  T(C): 35.8 (22 Feb 2022 09:50), Max: 37 (21 Feb 2022 12:00)  T(F): 96.4 (22 Feb 2022 09:50), Max: 98.6 (21 Feb 2022 12:00)  HR: 127 (22 Feb 2022 11:40) (56 - 127)  BP: --  BP(mean): --  ABP: 117/56 (22 Feb 2022 11:40) (80/47 - 170/69)  ABP(mean): 75 (22 Feb 2022 11:40) (57 - 106)  RR: 16 (22 Feb 2022 11:40) (10 - 29)  SpO2: 100% (22 Feb 2022 11:40) (94% - 100%)      VENTS:  Mode: AC/ CMV (Assist Control/ Continuous Mandatory Ventilation), RR (machine): 8, TV (machine): 400, FiO2: 21, PEEP: 5, ITime: 1, MAP: 8, PIP: 22    I&O:    02-21 @ 07:01 - 02-22 @ 07:00  --------------------------------------------------------  IN: 1593.4 mL / OUT: 1315 mL / NET: 278.4 mL    02-22 @ 07:01 - 02-22 @ 11:58  --------------------------------------------------------  IN: 439.2 mL / OUT: 205 mL / NET: 234.2 mL        PHYSICAL EXAM:    GENERAL: Intubated, sedated  CHEST/LUNG: Clear to auscultation bilaterally; No crackles, rhonchi, wheezing, or rubs  HEART: Regular rate and rhythm; No murmurs, rubs, or gallops  ABDOMEN: Soft, Nontender, Nondistended; Bowel sounds present  EXTREMITIES: 2+ pitting edema in dependent areas  SKIN: No rashes or lesions  NERVOUS SYSTEM:  Alert & Oriented x0, ?squeezed right hand to command or  may have been reflex  LINES:                                                          MEDICATIONS:  MEDICATIONS  (STANDING):  atorvastatin 80 milliGRAM(s) Oral at bedtime  carbidopa/levodopa  25/100 2.5 Tablet(s) Oral <User Schedule>  carbidopa/levodopa  25/100 2 Tablet(s) Oral <User Schedule>  chlorhexidine 0.12% Liquid 15 milliLiter(s) Oral Mucosa every 12 hours  chlorhexidine 4% Liquid 1 Application(s) Topical <User Schedule>  chlorhexidine 4% Liquid 1 Application(s) Topical <User Schedule>  dexMEDEtomidine Infusion 0.4 MICROgram(s)/kG/Hr (9.64 mL/Hr) IV Continuous <Continuous>  dextrose 10%. 1000 milliLiter(s) (50 mL/Hr) IV Continuous <Continuous>  dextrose 40% Gel 15 Gram(s) Oral once  dextrose 5%. 1000 milliLiter(s) (50 mL/Hr) IV Continuous <Continuous>  dextrose 5%. 1000 milliLiter(s) (100 mL/Hr) IV Continuous <Continuous>  dextrose 50% Injectable 25 Gram(s) IV Push once  dextrose 50% Injectable 12.5 Gram(s) IV Push once  dextrose 50% Injectable 25 Gram(s) IV Push once  diVALproex Sprinkle 250 milliGRAM(s) Oral three times a day  doxazosin 1 milliGRAM(s) Oral at bedtime  DULoxetine 20 milliGRAM(s) Oral daily  folic acid 1 milliGRAM(s) Oral daily  glucagon  Injectable 1 milliGRAM(s) IntraMuscular once  insulin lispro (ADMELOG) corrective regimen sliding scale   SubCutaneous every 6 hours  levothyroxine Injectable 30 MICROGram(s) IV Push at bedtime  memantine 5 milliGRAM(s) Oral at bedtime  midodrine 20 milliGRAM(s) Oral every 8 hours  mirtazapine 7.5 milliGRAM(s) Oral daily  mupirocin 2% Nasal 1 Application(s) Both Nostrils two times a day  Nephro-celeste 1 Tablet(s) Oral daily  norepinephrine Infusion 0.05 MICROgram(s)/kG/Min (9.04 mL/Hr) IV Continuous <Continuous>  pantoprazole  Injectable 40 milliGRAM(s) IV Push every 12 hours  piperacillin/tazobactam IVPB.. 3.375 Gram(s) IV Intermittent every 8 hours  QUEtiapine 50 milliGRAM(s) Oral at bedtime  trihexyphenidyl 2 milliGRAM(s) Oral three times a day  vancomycin    Solution 125 milliGRAM(s) Oral every 6 hours    MEDICATIONS  (PRN):  sodium chloride 0.9% lock flush 10 milliLiter(s) IV Push every 1 hour PRN Pre/post blood products, medications, blood draw, and to maintain line patency      ALLERGIES:  Allergies    adhesives (Rash)  latex (Urticaria)  No Known Drug Allergies    Intolerances        LABS:                        8.5    8.76  )-----------( 161      ( 22 Feb 2022 00:13 )             26.5     02-22    130<L>  |  97  |  30<H>  ----------------------------<  75  3.6   |  22  |  2.93<H>    Ca    8.4      22 Feb 2022 00:13  Phos  4.2     02-22  Mg     1.9     02-22    TPro  5.6<L>  /  Alb  1.8<L>  /  TBili  0.5  /  DBili  x   /  AST  21  /  ALT  <5<L>  /  AlkPhos  78  02-22          CAPILLARY BLOOD GLUCOSE      POCT Blood Glucose.: 126 mg/dL (22 Feb 2022 11:41)      RADIOLOGY & ADDITIONAL TESTS: Reviewed. MICU PROGRESS NOTE    INTERVAL HPI/OVERNIGHT EVENTS:  FSG to 70s, increase the D5 drip.    SUBJECTIVE:   N/A    OBJECTIVE:      VITAL SIGNS:  ICU Vital Signs Last 24 Hrs  T(C): 35.8 (22 Feb 2022 09:50), Max: 37 (21 Feb 2022 12:00)  T(F): 96.4 (22 Feb 2022 09:50), Max: 98.6 (21 Feb 2022 12:00)  HR: 127 (22 Feb 2022 11:40) (56 - 127)  BP: --  BP(mean): --  ABP: 117/56 (22 Feb 2022 11:40) (80/47 - 170/69)  ABP(mean): 75 (22 Feb 2022 11:40) (57 - 106)  RR: 16 (22 Feb 2022 11:40) (10 - 29)  SpO2: 100% (22 Feb 2022 11:40) (94% - 100%)      VENTS:  Mode: AC/ CMV (Assist Control/ Continuous Mandatory Ventilation), RR (machine): 8, TV (machine): 400, FiO2: 21, PEEP: 5, ITime: 1, MAP: 8, PIP: 22    I&O:    02-21 @ 07:01 - 02-22 @ 07:00  --------------------------------------------------------  IN: 1593.4 mL / OUT: 1315 mL / NET: 278.4 mL    02-22 @ 07:01  -  02-22 @ 11:58  --------------------------------------------------------  IN: 439.2 mL / OUT: 205 mL / NET: 234.2 mL        PHYSICAL EXAM:    GENERAL: Intubated, sedated  CHEST/LUNG: Clear to auscultation bilaterally; No crackles, rhonchi, wheezing, or rubs +int  HEART: Regular rate and rhythm; No murmurs, rubs, or gallops  ABDOMEN: Soft, Nontender, Nondistended; Bowel sounds present  EXTREMITIES: 2+ pitting edema in dependent areas  SKIN: No rashes or lesions  NERVOUS SYSTEM:  Alert & Oriented x0, ?squeezed right hand to command or  may have been reflex  LINES:                                                          MEDICATIONS:  MEDICATIONS  (STANDING):  atorvastatin 80 milliGRAM(s) Oral at bedtime  carbidopa/levodopa  25/100 2.5 Tablet(s) Oral <User Schedule>  carbidopa/levodopa  25/100 2 Tablet(s) Oral <User Schedule>  chlorhexidine 0.12% Liquid 15 milliLiter(s) Oral Mucosa every 12 hours  chlorhexidine 4% Liquid 1 Application(s) Topical <User Schedule>  chlorhexidine 4% Liquid 1 Application(s) Topical <User Schedule>  dexMEDEtomidine Infusion 0.4 MICROgram(s)/kG/Hr (9.64 mL/Hr) IV Continuous <Continuous>  dextrose 10%. 1000 milliLiter(s) (50 mL/Hr) IV Continuous <Continuous>  dextrose 40% Gel 15 Gram(s) Oral once  dextrose 5%. 1000 milliLiter(s) (50 mL/Hr) IV Continuous <Continuous>  dextrose 5%. 1000 milliLiter(s) (100 mL/Hr) IV Continuous <Continuous>  dextrose 50% Injectable 25 Gram(s) IV Push once  dextrose 50% Injectable 12.5 Gram(s) IV Push once  dextrose 50% Injectable 25 Gram(s) IV Push once  diVALproex Sprinkle 250 milliGRAM(s) Oral three times a day  doxazosin 1 milliGRAM(s) Oral at bedtime  DULoxetine 20 milliGRAM(s) Oral daily  folic acid 1 milliGRAM(s) Oral daily  glucagon  Injectable 1 milliGRAM(s) IntraMuscular once  insulin lispro (ADMELOG) corrective regimen sliding scale   SubCutaneous every 6 hours  levothyroxine Injectable 30 MICROGram(s) IV Push at bedtime  memantine 5 milliGRAM(s) Oral at bedtime  midodrine 20 milliGRAM(s) Oral every 8 hours  mirtazapine 7.5 milliGRAM(s) Oral daily  mupirocin 2% Nasal 1 Application(s) Both Nostrils two times a day  Nephro-celeste 1 Tablet(s) Oral daily  norepinephrine Infusion 0.05 MICROgram(s)/kG/Min (9.04 mL/Hr) IV Continuous <Continuous>  pantoprazole  Injectable 40 milliGRAM(s) IV Push every 12 hours  piperacillin/tazobactam IVPB.. 3.375 Gram(s) IV Intermittent every 8 hours  QUEtiapine 50 milliGRAM(s) Oral at bedtime  trihexyphenidyl 2 milliGRAM(s) Oral three times a day  vancomycin    Solution 125 milliGRAM(s) Oral every 6 hours    MEDICATIONS  (PRN):  sodium chloride 0.9% lock flush 10 milliLiter(s) IV Push every 1 hour PRN Pre/post blood products, medications, blood draw, and to maintain line patency      ALLERGIES:  Allergies    adhesives (Rash)  latex (Urticaria)  No Known Drug Allergies    Intolerances        LABS:                        8.5    8.76  )-----------( 161      ( 22 Feb 2022 00:13 )             26.5     02-22    130<L>  |  97  |  30<H>  ----------------------------<  75  3.6   |  22  |  2.93<H>    Ca    8.4      22 Feb 2022 00:13  Phos  4.2     02-22  Mg     1.9     02-22    TPro  5.6<L>  /  Alb  1.8<L>  /  TBili  0.5  /  DBili  x   /  AST  21  /  ALT  <5<L>  /  AlkPhos  78  02-22          CAPILLARY BLOOD GLUCOSE      POCT Blood Glucose.: 126 mg/dL (22 Feb 2022 11:41)      RADIOLOGY & ADDITIONAL TESTS: Reviewed.

## 2022-02-23 NOTE — PROGRESS NOTE ADULT - ASSESSMENT
72yo M w/ PMHx of CAD (s/p CABG 2019), CKD (unknown stage), DM2, Parkinson's Disease, HTN, depression presents with new onset acute heart failure exacerbation, NSTEMI, started on hep gtt, developed bl RP bleed, acute anemia. sp MICU course for hemorrhagic shock, 10/28 sp IR embolization l4 and l5 lumbar artery. sp permacath and AVF.    # chronic systolic and diastolic heart failure  # ESRD, new HD, AVF not matured, sp permacath  # Atrial flutter  # Parkinson's disease   # delirium  # CAD (coronary artery disease)  # HTN  # DM2 (diabetes mellitus, type 2)  # FTT sp PEG, dislodged and replaced  # C diff colitis  # UGIB due to GDA territory ulcer   # acute resp failure    remains on ventilator, SBT  PPI gtt  NPO  PO vanco for c diff  zosyn  sputum growing serratia and proteus  titrate pressors as tolerated  diuresis  palliative fu  appreciate ICU care  W  DVT ppx hold AC    [employer]  [phone]

## 2022-02-23 NOTE — PROGRESS NOTE ADULT - ASSESSMENT
71 M w volume overload, GI consulted for drop in hgb    1. GI Bleeding  -s/p endoscopy 2/16 which showed spurting duodenal ulcer; ulcer injected with epi for hemostasis, cautery and hemospray used.  -hgb 9.3, last episode of melena 2/20  -high dose PPI  -TF restarted today  -if bleeding occurs again would need embolization by IR  -monitor for GI bleeding  -trend CBCs  -family considering more palliative goals of care    2. ABBY on CKD per renal  -on HD via permacath per renal, midodrine during dialysis  -AVF not functional   -s/p tunnelled HD catheter insertion by IR 2/10    3. RP bleed  -s/p Abdominal Angiography and Embolization on 10/29/2021 by Interventional radiology of l4and l5 lumbar artery     3. C-diff, no BM since 2/20, TF restarted this am, will f/u on BMs  -on vanco     Attending supervision statement: I have personally seen and examined the patient. I fully participated in the care of this patient. I have made amendments to the documentation where necessary, and agree with the history, physical exam, and plan as outlined by the ACP.      [facility]  Gastroenterology and Hepatology  [address] [place], [place]  Office: [phone]  Cell: [phone]

## 2022-02-23 NOTE — PROGRESS NOTE ADULT - ATTENDING COMMENTS
Pt is a 72 yo M with h/o CAD (s/p CABG 2019), HTN, DM2, CKD, Parkinson's Disease, depression, dementia  who presented on 10/21/21 p/w LE swelling, was found to be in new onset acute HF exacerbation and NSTEMI s/p hep gtt, s/p MICU course for hemorrhagic shock 2 to b/l RP bleed s/p IR embolization l4 and l5 lumbar artery on 10/28. Hospital course complicated by COVID & new ESRD-HD as of this admission, s/p PermCath and AVF (maturing), new PEG. Further complicated by acute blood loss anemia 2 to UGIB 2 to duodenal ulcer w/ active bleeding vessel s/p endoscopy on 2/16 with epi and cauterization, remains intubated post procedure and admitted to MICU    Resp/ Social: Aggressive weaning pt was electively intubated and is DNR/ Family meeting with Palliative care tomorrow  ID: Last day of Vanco for C. dif  CVS: Midodrine + Levophed to maintain MAP >65; currently off Levophed  HEME: Follow Hb  FEN: Cont enteral feeds  GI: GI f/u prn  Renal: HD as per Renal  Neuro: Minimize sedation  Psych: Cont Psych meds

## 2022-02-23 NOTE — PROGRESS NOTE ADULT - SUBJECTIVE AND OBJECTIVE BOX
Chief complaint  Patient is a 71y old  Male who presents with a chief complaint of leg swelling (23 Feb 2022 13:32)   Review of systems  Intubated, no new hypoglycemic episodes.    Labs and Fingersticks  CAPILLARY BLOOD GLUCOSE      POCT Blood Glucose.: 172 mg/dL (23 Feb 2022 11:28)  POCT Blood Glucose.: 161 mg/dL (23 Feb 2022 03:04)  POCT Blood Glucose.: 182 mg/dL (22 Feb 2022 23:33)  POCT Blood Glucose.: 176 mg/dL (22 Feb 2022 17:15)      Anion Gap, Serum: 12 (02-23 @ 00:10)  Anion Gap, Serum: 11 (02-22 @ 00:13)      Calcium, Total Serum: 8.3 *L* (02-23 @ 00:10)  Calcium, Total Serum: 8.4 (02-22 @ 00:13)  Albumin, Serum: 1.8 *L* (02-23 @ 00:10)  Albumin, Serum: 1.8 *L* (02-22 @ 00:13)    Alanine Aminotransferase (ALT/SGPT): <5 *L* (02-23 @ 00:10)  Alanine Aminotransferase (ALT/SGPT): <5 *L* (02-22 @ 00:13)  Alkaline Phosphatase, Serum: 108 (02-23 @ 00:10)  Alkaline Phosphatase, Serum: 78 (02-22 @ 00:13)  Aspartate Aminotransferase (AST/SGOT): 26 (02-23 @ 00:10)  Aspartate Aminotransferase (AST/SGOT): 21 (02-22 @ 00:13)        02-23    134<L>  |  98  |  14  ----------------------------<  189<H>  3.5   |  24  |  1.99<H>    Ca    8.3<L>      23 Feb 2022 00:10  Phos  2.7     02-23  Mg     1.7     02-23    TPro  5.6<L>  /  Alb  1.8<L>  /  TBili  0.5  /  DBili  x   /  AST  26  /  ALT  <5<L>  /  AlkPhos  108  02-23                        9.3    9.48  )-----------( 226      ( 23 Feb 2022 00:10 )             28.8     Medications  MEDICATIONS  (STANDING):  acetaminophen    Suspension .. 650 milliGRAM(s) Oral once  atorvastatin 80 milliGRAM(s) Oral at bedtime  carbidopa/levodopa  25/100 2.5 Tablet(s) Oral <User Schedule>  carbidopa/levodopa  25/100 2 Tablet(s) Oral <User Schedule>  chlorhexidine 0.12% Liquid 15 milliLiter(s) Oral Mucosa every 12 hours  chlorhexidine 4% Liquid 1 Application(s) Topical <User Schedule>  dexMEDEtomidine Infusion 0.4 MICROgram(s)/kG/Hr (9.64 mL/Hr) IV Continuous <Continuous>  dextrose 40% Gel 15 Gram(s) Oral once  dextrose 5%. 1000 milliLiter(s) (50 mL/Hr) IV Continuous <Continuous>  dextrose 5%. 1000 milliLiter(s) (100 mL/Hr) IV Continuous <Continuous>  dextrose 50% Injectable 25 Gram(s) IV Push once  dextrose 50% Injectable 12.5 Gram(s) IV Push once  dextrose 50% Injectable 25 Gram(s) IV Push once  diVALproex Sprinkle 250 milliGRAM(s) Oral three times a day  doxazosin 1 milliGRAM(s) Oral at bedtime  DULoxetine 20 milliGRAM(s) Oral daily  folic acid 1 milliGRAM(s) Oral daily  glucagon  Injectable 1 milliGRAM(s) IntraMuscular once  levothyroxine Injectable 30 MICROGram(s) IV Push at bedtime  memantine 5 milliGRAM(s) Oral at bedtime  midodrine 30 milliGRAM(s) Oral every 8 hours  mirtazapine 7.5 milliGRAM(s) Oral daily  mupirocin 2% Nasal 1 Application(s) Both Nostrils two times a day  Nephro-celeste 1 Tablet(s) Oral daily  norepinephrine Infusion 0.05 MICROgram(s)/kG/Min (9.04 mL/Hr) IV Continuous <Continuous>  pantoprazole  Injectable 40 milliGRAM(s) IV Push every 12 hours  QUEtiapine 50 milliGRAM(s) Oral at bedtime  trihexyphenidyl 2 milliGRAM(s) Oral three times a day      Physical Exam  Culture - Sputum (collected 02-23-22 @ 12:29)  Source: .Sputum Sputum  Gram Stain (02-23-22 @ 15:25):    Numerous polymorphonuclear leukocytes per low power field    Few Squamous epithelial cells per low power field    Moderate Yeast like cells per oil power field    Moderate Gram Negative Rods per oil power field      General: Patient comfortable in bed  Vital Signs Last 12 Hrs  T(F): 100.2 (02-23-22 @ 12:00), Max: 100.4 (02-23-22 @ 04:00)  HR: 69 (02-23-22 @ 15:00) (69 - 119)  BP: --  BP(mean): --  RR: 12 (02-23-22 @ 15:00) (10 - 33)  SpO2: 100% (02-23-22 @ 15:00) (97% - 100%)  Neck: No palpable thyroid nodules.  CVS: S1S2, No murmurs  Respiratory: No wheezing, no crepitations  GI: Abdomen soft, bowel sounds positive  Musculoskeletal:  edema lower extremities.   Skin: No skin rashes, no ecchymosis    Diagnostics

## 2022-02-23 NOTE — PROGRESS NOTE ADULT - SUBJECTIVE AND OBJECTIVE BOX
MICU PROGRESS NOTE    INTERVAL HPI/OVERNIGHT EVENTS:  Yesterday afternoon tube feeds started. Overnight glucose improved so D10 drip discontinued.   Fever T38C at 4AM. Recultured.    SUBJECTIVE:   N/a  OBJECTIVE:    VITAL SIGNS:  ICU Vital Signs Last 24 Hrs  T(C): 37.8 (2022 08:00), Max: 38 (2022 04:00)  T(F): 100 (2022 08:00), Max: 100.4 (2022 04:00)  HR: 95 (2022 08:46) (59 - 132)  BP: --  BP(mean): --  ABP: 116/51 (2022 08:46) (80/47 - 134/69)  ABP(mean): 71 (2022 08:46) (57 - 89)  RR: 21 (2022 08:46) (2 - 28)  SpO2: 99% (:46) (97% - 100%)      VENTS:  Mode: CPAP with PS, FiO2: 21, PEEP: 5, PS: 10, MAP: 9, PIP: 16    I&O:     @ 07:  -   @ 07:00  --------------------------------------------------------  IN: 2152.1 mL / OUT: 905 mL / NET: 1247.1 mL     @ 07: @ 09:02  --------------------------------------------------------  IN: 40.5 mL / OUT: 0 mL / NET: 40.5 mL        PHYSICAL EXAM:    GENERAL: Intubated, sedated  CHEST/LUNG: Clear to auscultation bilaterally; No crackles, rhonchi, wheezing, or rubs +int  HEART: Regular rate and rhythm; No murmurs, rubs, or gallops  ABDOMEN: Soft, Nontender, Nondistended; Bowel sounds present +PEG tube  EXTREMITIES: 2+ pitting edema in dependent areas  SKIN: No rashes or lesions  NERVOUS SYSTEM:  Alert & Oriented x0, does not follow commands  LINES:                                            MEDICATIONS:  MEDICATIONS  (STANDING):  acetaminophen    Suspension .. 650 milliGRAM(s) Oral once  atorvastatin 80 milliGRAM(s) Oral at bedtime  carbidopa/levodopa  25/100 2.5 Tablet(s) Oral <User Schedule>  carbidopa/levodopa  25/100 2 Tablet(s) Oral <User Schedule>  chlorhexidine 0.12% Liquid 15 milliLiter(s) Oral Mucosa every 12 hours  chlorhexidine 4% Liquid 1 Application(s) Topical <User Schedule>  dexMEDEtomidine Infusion 0.4 MICROgram(s)/kG/Hr (9.64 mL/Hr) IV Continuous <Continuous>  dextrose 40% Gel 15 Gram(s) Oral once  dextrose 5%. 1000 milliLiter(s) (50 mL/Hr) IV Continuous <Continuous>  dextrose 5%. 1000 milliLiter(s) (100 mL/Hr) IV Continuous <Continuous>  dextrose 50% Injectable 25 Gram(s) IV Push once  dextrose 50% Injectable 12.5 Gram(s) IV Push once  dextrose 50% Injectable 25 Gram(s) IV Push once  diVALproex Sprinkle 250 milliGRAM(s) Oral three times a day  doxazosin 1 milliGRAM(s) Oral at bedtime  DULoxetine 20 milliGRAM(s) Oral daily  folic acid 1 milliGRAM(s) Oral daily  glucagon  Injectable 1 milliGRAM(s) IntraMuscular once  levothyroxine Injectable 30 MICROGram(s) IV Push at bedtime  memantine 5 milliGRAM(s) Oral at bedtime  midodrine 20 milliGRAM(s) Oral every 8 hours  mirtazapine 7.5 milliGRAM(s) Oral daily  mupirocin 2% Nasal 1 Application(s) Both Nostrils two times a day  Nephro-celeste 1 Tablet(s) Oral daily  norepinephrine Infusion 0.05 MICROgram(s)/kG/Min (9.04 mL/Hr) IV Continuous <Continuous>  pantoprazole  Injectable 40 milliGRAM(s) IV Push every 12 hours  QUEtiapine 50 milliGRAM(s) Oral at bedtime  trihexyphenidyl 2 milliGRAM(s) Oral three times a day    MEDICATIONS  (PRN):  sodium chloride 0.9% lock flush 10 milliLiter(s) IV Push every 1 hour PRN Pre/post blood products, medications, blood draw, and to maintain line patency      ALLERGIES:  Allergies    adhesives (Rash)  latex (Urticaria)  No Known Drug Allergies    Intolerances        LABS:                        9.3    9.48  )-----------( 226      ( 2022 00:10 )             28.8         134<L>  |  98  |  14  ----------------------------<  189<H>  3.5   |  24  |  1.99<H>    Ca    8.3<L>      2022 00:10  Phos  2.7       Mg     1.7         TPro  5.6<L>  /  Alb  1.8<L>  /  TBili  0.5  /  DBili  x   /  AST  26  /  ALT  <5<L>  /  AlkPhos  108        Urinalysis Basic - ( 2022 06:19 )    Color: Light Yellow / Appearance: Clear / S.008 / pH: x  Gluc: x / Ketone: Negative  / Bili: Negative / Urobili: Negative   Blood: x / Protein: 30 mg/dL / Nitrite: Negative   Leuk Esterase: Large / RBC: 3 /hpf / WBC 19 /HPF   Sq Epi: x / Non Sq Epi: 1 /hpf / Bacteria: Negative        CAPILLARY BLOOD GLUCOSE      POCT Blood Glucose.: 161 mg/dL (2022 03:04)      RADIOLOGY & ADDITIONAL TESTS: Reviewed.

## 2022-02-23 NOTE — PROGRESS NOTE ADULT - ASSESSMENT
Assessment  DMT2: 71y Male with DM T2 with hyperglycemia, A1C 6.4%, was on insulin at home, Levemir DC'd s/p hypoglycemia, now on insulin coverage, blood sugars trending up/slightly elevated, no new hypoglycemias, started on tube feeds.  Hypothyroidism: Patient has no history thyroid disease, was not on any thyroid supplements, subclinical with low-normal FT4 and lethargy, on synthroid 12.5 mcg IV daily, repeat TFTs show subclinical state, increased to synthroid 20mcg IV.  Hypercalcemia: Started on Sensipar 60mg daily, Ca improving.  CHF: on medications, stable, monitored.  HTN: Controlled,  on antihypertensive medications.  Parkinsons: on meds, monitored.  CKD: Monitor labs/BMP    john kelvin DAMON  [phone]

## 2022-02-23 NOTE — PROGRESS NOTE ADULT - SUBJECTIVE AND OBJECTIVE BOX
intubated, febrile  On levo 'HD yesterday       VITAL:  T(C): , Max: 38 (22 @ 04:00)  T(F): , Max: 100.4 (22 @ 04:00)  HR: 69 (22 @ 15:00)  BP: --  BP(mean): --  RR: 12 (22 @ 15:00)  SpO2: 100% (22 @ 15:00)  Wt(kg): --      PHYSICAL EXAM:  General: Intubated   HEENT: MMM  Lungs:CTA-b/l  Heart: RRR S1S2  Abdomen: Soft, NTND  Ext: no pedal edema b/l  : no johnson  Vascular access- R permcath  cath And LAV fistula       LABS:                        9.3    9.48  )-----------( 226      ( 2022 00:10 )             28.8     Na(134)/K(3.5)/Cl(98)/HCO3(24)/BUN(14)/Cr(1.99)Glu(189)/Ca(8.3)/Mg(1.7)/PO4(2.7)     @ 00:10  Na(130)/K(3.6)/Cl(97)/HCO3(22)/BUN(30)/Cr(2.93)Glu(75)/Ca(8.4)/Mg(1.9)/PO4(4.2)     @ 00:13  Na(132)/K(3.9)/Cl(99)/HCO3(23)/BUN(26)/Cr(2.46)Glu(141)/Ca(8.4)/Mg(1.9)/PO4(4.0)     @ 00:21    Urinalysis Basic - ( 2022 06:19 )    Color: Light Yellow / Appearance: Clear / S.008 / pH: x  Gluc: x / Ketone: Negative  / Bili: Negative / Urobili: Negative   Blood: x / Protein: 30 mg/dL / Nitrite: Negative   Leuk Esterase: Large / RBC: 3 /hpf / WBC 19 /HPF   Sq Epi: x / Non Sq Epi: 1 /hpf / Bacteria: Negative      M w/ HTN, DM2, CAD-CABG, Parkinson's disease, and advanced CKD, 10/21/21 p/w LE swelling, c/b COVID; now newly ESRD-HD as of this admission    (1)Renal - ESRD-HD   (2)Anemia- due to GI bleed   (3)Failure to thrive and confusion   (4) CV- on norepi and midodrine    RECOMMEND  (1)Plan for HD Thursday   (2wean pressors as able  (3) Vanco for C diff   (4) revisit goals of care as able               Maria Ines Watts MD  [facility]  Office: [phone]

## 2022-02-23 NOTE — CHART NOTE - NSCHARTNOTEFT_GEN_A_CORE
Nutrition Follow Up Note  Patient seen for: malnutrition follow-up     Chart reviewed, events noted - "71M, PMH of HTN, DM2, CAD s/p CABG in 2019, Parkinson's, advanced CKD, presented with LE swelling caused by acute HF exacerbation, found to have NSTEMI, ESRD upon admission. Hospital course c/b COVID infection. Pt s/p tunneled catheter, with history of PEG feeding (was pulled out, replaced on 2/11). S/p C Diff positive on 2/13, transferred to MICU on 2/16 for hemorraghic shock.  S/p endoscopy found to have upper GI bleed due to duodenal ulcer on 2/16. S/p johnson 2/21 for urinary retention. Last HD session 2/22, next on 2/24. Pt remains intubated and sedated, to be weaned off as tolerated for extubation. Palliative to consult with family on 2/24"    Source: [x] Medical record         -If unable to interview patient: [x] Trach/Vent/BiPAP     Diet Order:   Diet, NPO with Tube Feed:   Tube Feeding Modality: Orogastric  Glucerna 1.5 Lasha (GLUCERNA1.5RTH)  Total Volume for 24 Hours (mL): 720  Continuous  Starting Tube Feed Rate {mL per Hour}: 10  Increase Tube Feed Rate by (mL): 10     Every 4 hours  Until Goal Tube Feed Rate (mL per Hour): 40  Tube Feed Duration (in Hours): 18  Tube Feed Start Time: 11:00 (02-22-22 @ 16:22) [Active]    EN regimen: provides 720mL formula, 1080 calories, 59.4 g protein, 546mL free water. Based on daily weight of 77.4 kg, it provides 14 kcal/kg, 0.7 g/kg protein.     EN provision:   2/23 - tube feeds running at trickle feeds rate, 210 ml fed so far  2/22 - tube feeds initiated, 80mL fed    Is current order appropriate/adequate? [x]  No - not meeting pt's estimated nutrient needs. See EN recommendations below.     Nutrition-related concerns:  - Observed pt at bedside; intubated, sedated.  - On pressor support: norepinephrine infusing at rate 0.9mL/hr at time of RD visit, to be weaned for extubation per MICU note.  - Sedated with Precedex infusing at rate 19.3mL/hr at time of RD visit, to be weaned for extubation per MICU note.   - Per renal, pt did poorly during last HD session 2/22, unable to remove goal fluid. Next HD on 2/24 per nephro note. Cr elevated 1.99 on 2/23 per chart.   - Pt with episode of hypoglycemia on 2/21, managed by d/c Levemir per endo note. Pt with elevated blood glucose levels 189 mg/dL (2/23), being managed with Dextrose 40%, 50% injectable. Note, pt with hx of T2DM, and Hb A1c of 5.8% (1/14).   - Hypercalcemia improving, Ca 8.3 on 2/23  - Continued on micronutrient supplementation: Folic acid, Nephro-celeste    GI: Last BM on 2/20 per nursing flow sheet. Bowel Regimen? [x] No.   Pt with loose stools, C-diff positive on 2/13; treated with vancomycin via peg [id number] (2/13-2/23) per chart. Last melena episode on 2/20, no BM since per GI note.     Weights:   Dosing weight: 96.4 kg (2/22)  Admission weight: 97.1 kg   Daily Weight: 77.4 kg (2/16)   71 kg (2/15)   71.2 kg (2/13)   69.7 kg (2/11)     No new daily weights listed. Will continue to trend weight as they become available.     Nutritionally Pertinent Medications:   - Midodrine  - Levophed  - Protonix injectable  - Precedex  - Synthroid injectable  - Lipitor  - Levodopa 25/100  - Folic acid  - Nephro-celeste  - sodium chloride 0.9% (PRN)  - Dextrose 40%, 50%    Labs: on 2/23  Na 134 L  Cr 1.99 H  Gluc 189 H    Fingersticks: 161 mg/dL (2/23)  ;   mg/dL (2/22)  ;   mg/dL (2/21)  ;  126-141 mg/dL (2/20)  Hb A1c 5.8% (1/14)    Skin per nursing documentation: no pressure injuries reported at this time  Edema per nursing documentation: +2 Edema generalized    Estimated Needs:   [x] no change since previous assessment  Based on current daily weight 77.4 kg (2/16)  Energy Needs: (20-25 kcals/kg): 3875-8563 kcal/day  Protein Needs: (1.2-1.4g/kg):  g protein/day  Fluid needs deferred to provider.  The Kush State Equation (PSU) 2003b was used to calculate resting energy expenditure: 1809 kcals/day    Previous Nutrition Diagnosis:   1. Food and Nutrition Related Knowledge Deficit  2. Increased Nutrient Needs  3. Severe acute on chronic protein-calorie malnutrition  Nutrition Diagnosis is: [x] ongoing - being managed with EN regimen, micronutrient supplementation    New Nutrition Diagnosis: [x] Not applicable    Nutrition Care Plan:  [x] In Progress  [] Achieved  [] Not applicable    Nutrition Interventions:     Education Provided:       [] Yes:  [x] No: deferred at this time as pt intubated, sedated.     Recommendations:      1. If pt to continue on EN regimen, recommend the following: Initiate Glucerna 1.5 at 20mL/hr and increase 10mL every 6 hours till goal rate of 65mL/hr x 18hrs, which provides 1170mL formula, 1755 calories (22.6 kcal/kg) and 96 g protein (1.2 g/kg protein) based on daily weight of 77.4kg (2/16). Defer fluid needs to team.   2. If pt to be extubated and advanced to PO diet, recommend consistent carbohydrate diet, low sodium diet, Glucerna 1x/day (provides 220 calories and 10g protein). Defer diet texture and fluid consistencies to SLP and medical team.   3. Continue micronutrient supplementation - Folic acid, Nephro-celeste, if no contraindications.   4. Monitor weight trends, labs (glucose, electrolytes, basic metabolic panel) hydration status, BM, GI distress, skin integrity.  5. RD to remain available to adjust EN formulary as needed/able.      Monitoring and Evaluation:   Continue to monitor nutritional intake, tolerance to diet prescription, weights, labs, skin integrity    Gaurang Abreu, Dietetic Intern Pager#[phone]   RD remains available upon request and will follow up per protocol Nutrition Follow Up Note  Patient seen for: malnutrition follow-up     Chart reviewed, events noted - "71M, PMH of HTN, DM2, CAD s/p CABG in 2019, Parkinson's, advanced CKD, presented with LE swelling caused by acute HF exacerbation, found to have NSTEMI, ESRD upon admission. Hospital course c/b COVID infection. Pt s/p tunneled catheter, with history of PEG feeding (was pulled out, replaced on 2/11). S/p C Diff positive on 2/13, transferred to MICU on 2/16 for hemorraghic shock.  S/p endoscopy found to have upper GI bleed due to duodenal ulcer on 2/16. S/p johnson 2/21 for urinary retention. Last HD session 2/22, next on 2/24. Pt remains intubated and sedated, to be weaned off as tolerated for extubation. Palliative to consult with family on 2/24"    Source: [x] Medical record         -If unable to interview patient: [x] Trach/Vent/BiPAP     Diet Order:   Diet, NPO with Tube Feed:   Tube Feeding Modality: Orogastric  Glucerna 1.5 Lasha (GLUCERNA1.5RTH)  Total Volume for 24 Hours (mL): 720  Continuous  Starting Tube Feed Rate {mL per Hour}: 10  Increase Tube Feed Rate by (mL): 10     Every 4 hours  Until Goal Tube Feed Rate (mL per Hour): 40  Tube Feed Duration (in Hours): 18  Tube Feed Start Time: 11:00 (02-22-22 @ 16:22) [Active]    EN regimen: provides 720mL formula, 1080 calories, 59.4 g protein, 546mL free water. Based on daily weight of 77.4 kg, it provides 14 kcal/kg, 0.7 g/kg protein.     EN provision:   2/23 - tube feeds running at trickle feeds rate, 210 ml fed so far  2/22 - tube feeds initiated, 80mL fed    Is current order appropriate/adequate? [x]  No - not meeting pt's estimated nutrient needs. See EN recommendations below.     Nutrition-related concerns:  - Observed pt at bedside; intubated, sedated.  - On pressor support: norepinephrine infusing at rate 0.9mL/hr at time of RD visit, to be weaned for extubation per MICU note.  - Sedated with Precedex infusing at rate 19.3mL/hr at time of RD visit, to be weaned for extubation per MICU note.   - Per renal, pt did poorly during last HD session 2/22, unable to remove goal fluid. Next HD on 2/24 per nephro note. Cr elevated 1.99 on 2/23 per chart.   - Pt with episode of hypoglycemia on 2/21, managed by d/c Levemir per endo note. Pt with elevated blood glucose levels 189 mg/dL (2/23), being managed with Dextrose 40%, 50% injectable. Note, pt with hx of T2DM, and Hb A1c of 5.8% (1/14).   - Hypercalcemia improving, Ca 8.3 on 2/23  - Continued on micronutrient supplementation: Folic acid, Nephro-celeste    GI: Last BM on 2/20 per nursing flow sheet. Bowel Regimen? [x] No.   Pt with loose stools, C-diff positive on 2/13; treated with vancomycin via peg [id number] (2/13-2/23) per chart. Last melena episode on 2/20, no BM since per GI note.     Weights:   Dosing weight: 96.4 kg (2/22)  Admission weight: 97.1 kg   Daily Weight: 77.4 kg (2/16)   71 kg (2/15)   71.2 kg (2/13)   69.7 kg (2/11)     No new daily weights listed. Will continue to trend weight as they become available.     Nutritionally Pertinent Medications:   - Midodrine  - Levophed  - Protonix injectable  - Precedex  - Synthroid injectable  - Lipitor  - Levodopa 25/100  - Folic acid  - Remeron  - Nephro-celeste  - sodium chloride 0.9% (PRN)  - Dextrose 40%, 50%    Labs: on 2/23  Na 134 L  Cr 1.99 H  Gluc 189 H    Fingersticks: 161 mg/dL (2/23)  ;   mg/dL (2/22)  ;   mg/dL (2/21)  ;  126-141 mg/dL (2/20)  Hb A1c 5.8% (1/14)    Skin per nursing documentation: no pressure injuries reported at this time  Edema per nursing documentation: +2 Edema generalized    Estimated Needs:   [x] no change since previous assessment  Based on current daily weight 77.4 kg (2/16)  Energy Needs: (20-25 kcals/kg): 7069-0255 kcal/day  Protein Needs: (1.2-1.4g/kg):  g protein/day  Fluid needs deferred to provider.  The Kush State Equation (PSU) 2003b was used to calculate resting energy expenditure: 1809 kcals/day    Previous Nutrition Diagnosis:   1. Food and Nutrition Related Knowledge Deficit  2. Increased Nutrient Needs  3. Severe acute on chronic protein-calorie malnutrition  Nutrition Diagnosis is: [x] ongoing - being managed with EN regimen, micronutrient supplementation    New Nutrition Diagnosis: [x] Not applicable    Nutrition Care Plan:  [x] In Progress  [] Achieved  [] Not applicable    Nutrition Interventions:     Education Provided:       [] Yes:  [x] No: deferred at this time as pt intubated, sedated.     Recommendations:      1. If pt to continue on EN regimen, recommend to continue Glucerna 1.5 at 40mL/hr and increase 10mL every 6 hours till new goal rate of 65mL/hr x 18hrs, which provides 1170mL formula, 1755 calories (22.6 kcal/kg) and 96 g protein (1.2 g/kg protein) based on daily weight of 77.4kg (2/16). Defer fluid needs to team.   2. If pt to be extubated and advanced to PO diet, recommend consistent carbohydrate diet, low sodium diet, Glucerna 1x/day (provides 220 calories and 10g protein). Defer diet texture and fluid consistencies to SLP and medical team.   3. Continue micronutrient supplementation - Folic acid, Nephro-celeste, if no contraindications.   4. Monitor weight trends, labs (glucose, electrolytes, basic metabolic panel) hydration status, BM, GI distress, skin integrity.  5. RD to remain available to adjust EN formulary as needed/able.      Monitoring and Evaluation:   Continue to monitor nutritional intake, tolerance to diet prescription, weights, labs, skin integrity    Gaurang Abreu, Dietetic Intern Pager#[phone]   RD remains available upon request and will follow up per protocol

## 2022-02-23 NOTE — PROGRESS NOTE ADULT - PROBLEM SELECTOR PLAN 1
Will restart Levemir 4u AM and continue coverage scale. Will continue monitoring FS and FU.  Suggest DC on Tradjenta 5mg daily, discontinue prior hypoglycemic agents/insulin, endo FU 4 weeks.

## 2022-02-23 NOTE — PROGRESS NOTE ADULT - SUBJECTIVE AND OBJECTIVE BOX
Patient is a 71y old  Male who presents with a chief complaint of leg swelling (2022 12:13)      INTERVAL HPI/OVERNIGHT EVENTS: noted  pt seen and examined this am   events noted  intubated, sedated on pressors      Vital Signs Last 24 Hrs  T(C): 37.9 (2022 12:00), Max: 38 (2022 04:00)  T(F): 100.2 (2022 12:00), Max: 100.4 (2022 04:00)  HR: 70 (2022 13:00) (64 - 129)  BP: --  BP(mean): --  RR: 22 (2022 13:00) (2 - 30)  SpO2: 99% (2022 13:00) (97% - 100%)    acetaminophen    Suspension .. 650 milliGRAM(s) Oral once  atorvastatin 80 milliGRAM(s) Oral at bedtime  carbidopa/levodopa  25/100 2.5 Tablet(s) Oral <User Schedule>  carbidopa/levodopa  25/100 2 Tablet(s) Oral <User Schedule>  chlorhexidine 0.12% Liquid 15 milliLiter(s) Oral Mucosa every 12 hours  chlorhexidine 4% Liquid 1 Application(s) Topical <User Schedule>  dexMEDEtomidine Infusion 0.4 MICROgram(s)/kG/Hr IV Continuous <Continuous>  dextrose 40% Gel 15 Gram(s) Oral once  dextrose 5%. 1000 milliLiter(s) IV Continuous <Continuous>  dextrose 5%. 1000 milliLiter(s) IV Continuous <Continuous>  dextrose 50% Injectable 25 Gram(s) IV Push once  dextrose 50% Injectable 12.5 Gram(s) IV Push once  dextrose 50% Injectable 25 Gram(s) IV Push once  diVALproex Sprinkle 250 milliGRAM(s) Oral three times a day  doxazosin 1 milliGRAM(s) Oral at bedtime  DULoxetine 20 milliGRAM(s) Oral daily  folic acid 1 milliGRAM(s) Oral daily  glucagon  Injectable 1 milliGRAM(s) IntraMuscular once  levothyroxine Injectable 30 MICROGram(s) IV Push at bedtime  memantine 5 milliGRAM(s) Oral at bedtime  midodrine 30 milliGRAM(s) Oral every 8 hours  mirtazapine 7.5 milliGRAM(s) Oral daily  mupirocin 2% Nasal 1 Application(s) Both Nostrils two times a day  Nephro-celeste 1 Tablet(s) Oral daily  norepinephrine Infusion 0.05 MICROgram(s)/kG/Min IV Continuous <Continuous>  pantoprazole  Injectable 40 milliGRAM(s) IV Push every 12 hours  QUEtiapine 50 milliGRAM(s) Oral at bedtime  sodium chloride 0.9% lock flush 10 milliLiter(s) IV Push every 1 hour PRN  trihexyphenidyl 2 milliGRAM(s) Oral three times a day      PHYSICAL EXAM:  GENERAL: NAD,   EYES: conjunctiva and sclera clear  ENMT: Moist mucous membranes  NECK: Supple, No JVD, Normal thyroid  CHEST/LUNG: non labored, cta b/l  HEART: Regular rate and rhythm; No murmurs, rubs, or gallops  ABDOMEN: Soft, Nontender, Nondistended; Bowel sounds present  EXTREMITIES:  2+ Peripheral Pulses, No clubbing, cyanosis, or edema  LYMPH: No lymphadenopathy noted  SKIN: No rashes or lesions    Consultant(s) Notes Reviewed:  [x ] YES  [ ] NO  Care Discussed with Consultants/Other Providers [ x] YES  [ ] NO    LABS:                        9.3    9.48  )-----------( 226      ( 2022 00:10 )             28.8         134<L>  |  98  |  14  ----------------------------<  189<H>  3.5   |  24  |  1.99<H>    Ca    8.3<L>      2022 00:10  Phos  2.7       Mg     1.7         TPro  5.6<L>  /  Alb  1.8<L>  /  TBili  0.5  /  DBili  x   /  AST  26  /  ALT  <5<L>  /  AlkPhos  108        Urinalysis Basic - ( 2022 06:19 )    Color: Light Yellow / Appearance: Clear / S.008 / pH: x  Gluc: x / Ketone: Negative  / Bili: Negative / Urobili: Negative   Blood: x / Protein: 30 mg/dL / Nitrite: Negative   Leuk Esterase: Large / RBC: 3 /hpf / WBC 19 /HPF   Sq Epi: x / Non Sq Epi: 1 /hpf / Bacteria: Negative      CAPILLARY BLOOD GLUCOSE      POCT Blood Glucose.: 172 mg/dL (2022 11:28)  POCT Blood Glucose.: 161 mg/dL (2022 03:04)  POCT Blood Glucose.: 182 mg/dL (2022 23:33)  POCT Blood Glucose.: 176 mg/dL (2022 17:15)        Urinalysis Basic - ( 2022 06:19 )    Color: Light Yellow / Appearance: Clear / S.008 / pH: x  Gluc: x / Ketone: Negative  / Bili: Negative / Urobili: Negative   Blood: x / Protein: 30 mg/dL / Nitrite: Negative   Leuk Esterase: Large / RBC: 3 /hpf / WBC 19 /HPF   Sq Epi: x / Non Sq Epi: 1 /hpf / Bacteria: Negative          RADIOLOGY & ADDITIONAL TESTS:    Imaging Personally Reviewed:  [x ] YES  [ ] NO

## 2022-02-23 NOTE — PROGRESS NOTE ADULT - SUBJECTIVE AND OBJECTIVE BOX
Chief Complaint:  Patient is a 71y old  Male who presents with a chief complaint of leg swelling (2022 09:02)      Date of service 22 @ 12:13      Interval Events:   Patient seen and examined.   Remains intubated and sedated in MICU.  No BM since , TF restarted this morning.     Hospital Medications:  acetaminophen    Suspension .. 650 milliGRAM(s) Oral once  atorvastatin 80 milliGRAM(s) Oral at bedtime  carbidopa/levodopa  25/100 2.5 Tablet(s) Oral <User Schedule>  carbidopa/levodopa  25/100 2 Tablet(s) Oral <User Schedule>  chlorhexidine 0.12% Liquid 15 milliLiter(s) Oral Mucosa every 12 hours  chlorhexidine 4% Liquid 1 Application(s) Topical <User Schedule>  dexMEDEtomidine Infusion 0.4 MICROgram(s)/kG/Hr IV Continuous <Continuous>  dextrose 40% Gel 15 Gram(s) Oral once  dextrose 5%. 1000 milliLiter(s) IV Continuous <Continuous>  dextrose 5%. 1000 milliLiter(s) IV Continuous <Continuous>  dextrose 50% Injectable 25 Gram(s) IV Push once  dextrose 50% Injectable 12.5 Gram(s) IV Push once  dextrose 50% Injectable 25 Gram(s) IV Push once  diVALproex Sprinkle 250 milliGRAM(s) Oral three times a day  doxazosin 1 milliGRAM(s) Oral at bedtime  DULoxetine 20 milliGRAM(s) Oral daily  folic acid 1 milliGRAM(s) Oral daily  glucagon  Injectable 1 milliGRAM(s) IntraMuscular once  levothyroxine Injectable 30 MICROGram(s) IV Push at bedtime  memantine 5 milliGRAM(s) Oral at bedtime  midodrine 30 milliGRAM(s) Oral every 8 hours  mirtazapine 7.5 milliGRAM(s) Oral daily  mupirocin 2% Nasal 1 Application(s) Both Nostrils two times a day  Nephro-celeste 1 Tablet(s) Oral daily  norepinephrine Infusion 0.05 MICROgram(s)/kG/Min IV Continuous <Continuous>  pantoprazole  Injectable 40 milliGRAM(s) IV Push every 12 hours  QUEtiapine 50 milliGRAM(s) Oral at bedtime  sodium chloride 0.9% lock flush 10 milliLiter(s) IV Push every 1 hour PRN  trihexyphenidyl 2 milliGRAM(s) Oral three times a day        Review of Systems:  unable to obtain, intubated     PHYSICAL EXAM:   Vital Signs:  Vital Signs Last 24 Hrs  T(C): 37.9 (2022 12:00), Max: 38 (2022 04:00)  T(F): 100.2 (2022 12:00), Max: 100.4 (2022 04:00)  HR: 83 (:00) (64 - 132)  BP: --  BP(mean): --  RR: 30 (:00) (2 - 30)  SpO2: 100% (:00) (97% - 100%)  Daily     Daily       PHYSICAL EXAM:     GENERAL:  Appears stated age, well-groomed, well-nourished, no distress  HEENT:  NC/AT,  conjunctivae anicteric, clear and pink,   NECK: supple, trachea midline  CHEST:  Full & symmetric excursion, no increased effort, breath sounds clear  HEART:  Regular rhythm, no JVD  ABDOMEN:  Soft, non-tender, non-distended, normoactive bowel sounds,  no masses , no hepatosplenomegaly, +gastrostomy   EXTREMITIES:  no cyanosis,clubbing or edema  SKIN:  No rash, erythema, or, ecchymoses, no jaundice  NEURO:  non-focal, no asterixis  RECTAL: Deferred      LABS Personally reviewed by me:                        9.3    9.48  )-----------( 226      ( 2022 00:10 )             28.8     Mean Cell Volume: 97.3 fl (-22 @ 00:10)        134<L>  |  98  |  14  ----------------------------<  189<H>  3.5   |  24  |  1.99<H>    Ca    8.3<L>      2022 00:10  Phos  2.7       Mg     1.7         TPro  5.6<L>  /  Alb  1.8<L>  /  TBili  0.5  /  DBili  x   /  AST  26  /  ALT  <5<L>  /  AlkPhos  108      LIVER FUNCTIONS - ( 2022 00:10 )  Alb: 1.8 g/dL / Pro: 5.6 g/dL / ALK PHOS: 108 U/L / ALT: <5 U/L / AST: 26 U/L / GGT: x             Urinalysis Basic - ( 2022 06:19 )    Color: Light Yellow / Appearance: Clear / S.008 / pH: x  Gluc: x / Ketone: Negative  / Bili: Negative / Urobili: Negative   Blood: x / Protein: 30 mg/dL / Nitrite: Negative   Leuk Esterase: Large / RBC: 3 /hpf / WBC 19 /HPF   Sq Epi: x / Non Sq Epi: 1 /hpf / Bacteria: Negative                              9.3    9.48  )-----------( 226      ( 2022 00:10 )             28.8                         8.5    8.76  )-----------( 161      ( 2022 00:13 )             26.5                         8.4    9.48  )-----------( 131      ( 2022 00:22 )             26.6       Imaging personally reviewed by me:

## 2022-02-23 NOTE — PROGRESS NOTE ADULT - ASSESSMENT
70yo M w/ PMHx of HTN,  CAD (s/p CABG 2019), CKD (unknown stage), DM2, Parkinson's Disease, depression, dementia  who presented on 10/21/21 p/w LE swelling, was found to be in new onset acute HF exacerbation and NSTEMI s/p hep gtt, s/p MICU course for hemorrhagic shock 2/2 b/l RP bleed s/p IR embolization l4 and l5 lumbar artery on 10/28. Hospital course c/b COVID & new ESRD-HD as of this admission, s/p PermCath and AVF (maturing), new PEG. Further c/b acute blood loss anemia 2/2 UGIB 2/2 duodenal ulcer w/ active bleeding vessel s/p endoscopy on 2/16 with epi and cauterization, remains intubated post procedure and admitted to MICU for monitoring and management.    Neuro   - Wean Precedex in prep for extubation  - Avoid haldo/zyprexa for agitation due to hx of PD; may use Ativan 0.25 prn if agitated when weaning sedation  -When pt on medical floors he was confused but able to answer simple questions and identify family members    #Parkinson disease   - c/w Sinemet 25/100 2.5 TAB 6 am and 2pm  - c/w Sinemet 25/100 2 TAB 10pm   - c/w Artane 2mg TID     #Depression   - c/w Cymbalta 20mg qd    - c/w Divalproex 250mg tid  - c/w Seroquel 50mg qhs  - c/w Remeron 7.5mg qd       #Dementia   - c/w Namenda 5mg qhs    CV  #Shock state: possible sepsis vs. vasoplegia  Pressures labile, pt has been on and off levophed  - Wean Levo as tolerated;   - Midodrine 20q8h with hold for bradycardia   - Treat sepsis empirically    #Heart failure & CAD s/p CABG 2019  - Hold metoprolol 25mg given hypotension and presser requirements   - 2/21: restart lasix with 80mg IV x1  - c/w atorvastatin 80mg qhs    PULM  - Recent COVID infection, PCR still positive, no isolation  - Intubated prior to endoscopy, plan to wean and extubate after another session of dialysis   - PS trial when weaning sedation, and trial of extubation if able to tolerate    GI  #Acute blood loss anemia 2/2 UGIB 2/2 duodenal ulcer w/ active bleeding vessel   - S/p Endoscopy w/duodenal ulcer s/p tx w/ epi and cautery on 2/16  - If re-bleeds will require IR intervention  - C/w IV BID PPI   - Restart TF as tolerated  - H&H stable 2/20; Trend CBC daily and keep active T&S  - last melena was 2/20 am -- no BM since then     #C-diff  Positive on 2/13   - C/w vanco 125mg q6h via PEG x 10 days 2/13 - 2/23    Renal   #ESRD on HD via permacath (placed 2/10/22 R IJ); New LUE AVF  - Minimal urine output currently  - HD per nephro  - C/w nephro nina qd   - Replete lytes as appropriate  - c/w Sensipar 60mg qd for hypercalcemia  - On 2/22 did poorly with HD could only remove 500 cc of planned 2L , became tachycardic    #BPH   - C/w doxazosin   -Javier (2/21) for urinary retention  [ ] bladder scan q8h    ENDO  #DMT2  - ENDO: Suggest DC on Tradjenta 5mg daily, discontinue prior hypoglycemic agents/insulin, endo FU 4 weeks.    #Hypoglycemia  -on D10  - If FSG improve can DC D10 (starting TFs today)    #Hypothyroidism.   - continue IV levothyroxine to 30 mg due to subclinical hypothyroidism and hypotension    HEME   # Acute blood loss 2/2 UGIB 2/2 duodenal ulcer   - Hold AC or DVT ppx  - H&H stable, trend CBC qd and active T&S    #Hx of b/l RP bleed during this admission   -s/p Abdominal Angiography and Embolization on 10/29/2021 by Interventional radiology of l4and l5 lumbar artery     ID  # Sepsis   - 2/17 Bcx, NGTD   - 2/18 Sputum -Serratia and proteus, no PMNs  - Was started with empiric coverage with Zosyn 2/17 for leukocytosis and hypothermia. Pt continues to be hypothermic on abx.   - MRSA swab neg (MSSA positive, started on mupirocin nasal ointment for 10 days)      #C Diff  Positive on 2/13   - C/w vanco 125mg q6h via PEG x 10 days 2/13 - 2/23    PPX  - GI ppx ; PPI gtt --> IV BID on 2/19  - DVT ppx ; compression devices    Ethics  - DNR/DNI  - Discussed with family and they want no reintubation if the patient goes into respiratory failure after extubation they would want a comfort care approach; would like to be present for extubation.   Plan for family meeting 2/24 @ 10:30 AM with palliative 72yo M w/ PMHx of HTN,  CAD (s/p CABG 2019), CKD (unknown stage), DM2, Parkinson's Disease, depression, dementia  who presented on 10/21/21 p/w LE swelling, was found to be in new onset acute HF exacerbation and NSTEMI s/p hep gtt, s/p MICU course for hemorrhagic shock 2/2 b/l RP bleed s/p IR embolization l4 and l5 lumbar artery on 10/28. Hospital course c/b COVID & new ESRD-HD as of this admission, s/p PermCath and AVF (maturing), new PEG. Further c/b acute blood loss anemia 2/2 UGIB 2/2 duodenal ulcer w/ active bleeding vessel s/p endoscopy on 2/16 with epi and cauterization, remains intubated post procedure and admitted to MICU for monitoring and management.    Neuro   - On precedex, no mental status  - Avoid haldo/zyprexa for agitation due to hx of PD; may use Ativan 0.25 prn if agitated when weaning sedation  -When pt on medical floors he was confused but able to answer simple questions and identify family members, not able to have normal conversation though    #Parkinson disease   - c/w Sinemet 25/100 2.5 TAB 6 am and 2pm  - c/w Sinemet 25/100 2 TAB 10pm   - c/w Artane 2mg TID     #Depression   - c/w Cymbalta 20mg qd    - c/w Divalproex 250mg tid  - c/w Seroquel 50mg qhs  - c/w Remeron 7.5mg qd       #Dementia   - c/w Namenda 5mg qhs    CV  #Shock state: possible sepsis vs. vasoplegia  Pressures labile, pt has been on and off levophed  - Wean Levo as tolerated;   - Midodrine 20q8h with hold for bradycardia --> increase to 30 q8h   - Treat sepsis empirically    #Heart failure & CAD s/p CABG 2019  - Hold metoprolol 25mg given hypotension and pressor requirements   -Fluid removal by HD, trialed lasix since pt making urine - did not respond  - c/w atorvastatin 80mg qhs    PULM  - Recent COVID infection, PCR still positive, no isolation  - Intubated prior to endoscopy, plan to wean and extubate after another session of dialysis   - PS trial when weaning sedation, and trial of extubation if able to tolerate    GI  #Acute blood loss anemia 2/2 UGIB 2/2 duodenal ulcer w/ active bleeding vessel   - S/p Endoscopy w/duodenal ulcer s/p tx w/ epi and cautery on 2/16  - If re-bleeds will require IR intervention  - C/w IV BID PPI   - Restart TF as tolerated  - H&H stable 2/20; Trend CBC daily and keep active T&S  - last melena was 2/20 am -- no BM since then     #C-diff  Positive on 2/13   - C/w vanco 125mg q6h via PEG x 10 days 2/13 - 2/23    Renal   #ESRD on HD via permacath (placed 2/10/22 R IJ); New LUE AVF  - Minimal urine output currently  - HD per nephro  - C/w nephro nina qd   - Replete lytes as appropriate  - c/w Sensipar 60mg qd for hypercalcemia  - On 2/22 did poorly with HD could only remove 500 cc of planned 2L , became tachycardic    #BPH   - C/w doxazosin   -Javier (2/21) for urinary retention --> DC 2/22  -Straight cath 2/23 AM 200cc  [ ] bladder scan q8h    ENDO  #DMT2  - ENDO: Suggest DC on Tradjenta 5mg daily, discontinue prior hypoglycemic agents/insulin, endo FU 4 weeks.    #Hypoglycemia  -on D10  - If FSG improve can DC D10 (starting TFs today)    #Hypothyroidism.   - continue IV levothyroxine to 30 mg due to subclinical hypothyroidism and hypotension    HEME   # Acute blood loss 2/2 UGIB 2/2 duodenal ulcer   - Hold AC or DVT ppx  - H&H stable, trend CBC qd and active T&S    #Hx of b/l RP bleed during this admission   -s/p Abdominal Angiography and Embolization on 10/29/2021 by Interventional radiology of l4and l5 lumbar artery     ID  # Sepsis   - 2/17 Bcx, NGTD   - 2/18 Sputum -Serratia and proteus, no PMNs  - Was started with empiric coverage with Zosyn 2/17 for leukocytosis and hypothermia. Pt continues to be hypothermic on abx. Then had fever. Zosyn was discontinued 2/22, since it was started only for empiric for sputum  -2/23 Febrile to T38, monitor off abx.  - MRSA swab neg (MSSA positive, started on mupirocin nasal ointment for 10 days)      #C Diff  Positive on 2/13   - C/w vanco 125mg q6h via PEG x 10 days 2/13 - 2/23    PPX  - GI ppx ; PPI gtt --> IV BID on 2/19  - DVT ppx ; compression devices    Ethics  - DNR/DNI  - Discussed with family and they want no reintubation if the patient goes into respiratory failure after extubation they would want a comfort care approach; would like to be present for extubation.   Plan for family meeting 2/24 @ 10:30 AM with palliative 72yo M w/ PMHx of HTN,  CAD (s/p CABG 2019), CKD (unknown stage), DM2, Parkinson's Disease, depression, dementia  who presented on 10/21/21 p/w LE swelling, was found to be in new onset acute HF exacerbation and NSTEMI s/p hep gtt, s/p MICU course for hemorrhagic shock 2/2 b/l RP bleed s/p IR embolization l4 and l5 lumbar artery on 10/28. Hospital course c/b COVID & new ESRD-HD as of this admission, s/p PermCath and AVF (maturing), new PEG. Further c/b acute blood loss anemia 2/2 UGIB 2/2 duodenal ulcer w/ active bleeding vessel s/p endoscopy on 2/16 with epi and cauterization, remains intubated post procedure and admitted to MICU for monitoring and management.    Neuro   - On precedex, no mental status  - Avoid haldo/zyprexa for agitation due to hx of PD; may use Ativan 0.25 prn if agitated when weaning sedation  -When pt on medical floors he was confused but able to answer simple questions and identify family members, not able to have normal conversation though    #Parkinson disease   - c/w Sinemet 25/100 2.5 TAB 6 am and 2pm  - c/w Sinemet 25/100 2 TAB 10pm   - c/w Artane 2mg TID     #Depression   - c/w Cymbalta 20mg qd    - c/w Divalproex 250mg tid  - c/w Seroquel 50mg qhs  - c/w Remeron 7.5mg qd       #Dementia   - c/w Namenda 5mg qhs    CV  #Shock state: possible sepsis vs. vasoplegia  Pressures labile, pt has been on and off levophed  - Wean Levo as tolerated;   - Midodrine 20q8h with hold for bradycardia --> increase to 30 q8h   - Treat sepsis empirically    #Heart failure & CAD s/p CABG 2019  - Hold metoprolol 25mg given hypotension and pressor requirements   -Fluid removal by HD, trialed lasix since pt making urine - did not respond  - c/w atorvastatin 80mg qhs    PULM  - Recent COVID infection, PCR still positive, no isolation  - Intubated prior to endoscopy, plan to wean and extubate after another session of dialysis   - PS trial when weaning sedation, and trial of extubation if able to tolerate    GI  #Acute blood loss anemia 2/2 UGIB 2/2 duodenal ulcer w/ active bleeding vessel   - S/p Endoscopy w/duodenal ulcer s/p tx w/ epi and cautery on 2/16  - If re-bleeds will require IR intervention  - C/w IV BID PPI   - Restart TF as tolerated  - H&H stable 2/20; Trend CBC daily and keep active T&S  - last melena was 2/20 am -- no BM since then     #C-diff  Positive on 2/13   - C/w vanco 125mg q6h via PEG x 10 days 2/13 - 2/23    Renal   #ESRD on HD via permacath (placed 2/10/22 R IJ); New LUE AVF  - Minimal urine output currently  - HD per nephro  - C/w nephro nina qd   - Replete lytes as appropriate  - c/w Sensipar 60mg qd for hypercalcemia  - On 2/22 did poorly with HD could only remove 500 cc of planned 2L , became tachycardic    #BPH   - C/w doxazosin   -Javier (2/21) for urinary retention --> DC 2/22  -Straight cath 2/23 AM 200cc  [ ] bladder scan q8h    ENDO  #DMT2  - ENDO: Suggest DC on Tradjenta 5mg daily, discontinue prior hypoglycemic agents/insulin, endo FU 4 weeks.    #Hypoglycemia -- resolved  -s/p D10  -resolved now that on TFs    #Hypothyroidism.   - continue IV levothyroxine to 30 mg due to subclinical hypothyroidism and hypotension    HEME   # Acute blood loss 2/2 UGIB 2/2 duodenal ulcer   - Hold AC or DVT ppx  - H&H stable, trend CBC qd and active T&S    #Hx of b/l RP bleed during this admission   -s/p Abdominal Angiography and Embolization on 10/29/2021 by Interventional radiology of l4and l5 lumbar artery     ID  # Sepsis   - 2/17 Bcx, NGTD   - 2/18 Sputum -Serratia and proteus, no PMNs  - Was started with empiric coverage with Zosyn 2/17 for leukocytosis and hypothermia. Pt continues to be hypothermic on abx. Then had fever. Zosyn was discontinued 2/22, since it was started only for empiric for sputum  -2/23 Febrile to T38, monitor off abx.  - MRSA swab neg (MSSA positive, started on mupirocin nasal ointment for 10 days)      #C Diff  Positive on 2/13   - C/w vanco 125mg q6h via PEG x 10 days 2/13 - 2/23    PPX  - GI ppx ; PPI gtt --> IV BID on 2/19  - DVT ppx ; compression devices    Ethics  - DNR/DNI  - Discussed with family and they want no reintubation if the patient goes into respiratory failure after extubation they would want a comfort care approach; would like to be present for extubation.   Plan for family meeting 2/24 @ 10:30 AM with palliative

## 2022-02-24 NOTE — PROGRESS NOTE ADULT - PROBLEM SELECTOR PLAN 1
Insulin orders DC'd now that patient is for comfort care.  We will be signing off on the care of this patient. Please do not hesitate to contact us with any further questions or concerns. Thank you for involving us in the care of this patient.  Suggest DC on Tradjenta 5mg daily, discontinue prior hypoglycemic agents/insulin, endo FU 4 weeks.

## 2022-02-24 NOTE — PROGRESS NOTE ADULT - PROBLEM SELECTOR PLAN 5
see above GOC note  plan is for compassionate withdrawal of care and transition of care to symptom directed

## 2022-02-24 NOTE — PROGRESS NOTE ADULT - SUBJECTIVE AND OBJECTIVE BOX
SUBJECTIVE AND OBJECTIVE: Pt seen and examined at bedside. Remains intubated. Receiving HD at bedside. Opens eyes but continues to have poor mentation and unable to provide history.  INTERVAL HPI/OVERNIGHT EVENTS: No acute events reported.    DNR on chart:   Allergies    adhesives (Rash)  latex (Urticaria)  No Known Drug Allergies    Intolerances    MEDICATIONS  (STANDING):  atorvastatin 80 milliGRAM(s) Oral at bedtime  carbidopa/levodopa  25/100 2.5 Tablet(s) Oral <User Schedule>  carbidopa/levodopa  25/100 2 Tablet(s) Oral <User Schedule>  chlorhexidine 0.12% Liquid 15 milliLiter(s) Oral Mucosa every 12 hours  chlorhexidine 4% Liquid 1 Application(s) Topical <User Schedule>  dexMEDEtomidine Infusion 0.4 MICROgram(s)/kG/Hr (9.64 mL/Hr) IV Continuous <Continuous>  dextrose 40% Gel 15 Gram(s) Oral once  dextrose 5%. 1000 milliLiter(s) (50 mL/Hr) IV Continuous <Continuous>  dextrose 5%. 1000 milliLiter(s) (100 mL/Hr) IV Continuous <Continuous>  dextrose 50% Injectable 25 Gram(s) IV Push once  dextrose 50% Injectable 12.5 Gram(s) IV Push once  dextrose 50% Injectable 25 Gram(s) IV Push once  diVALproex Sprinkle 250 milliGRAM(s) Oral three times a day  doxazosin 1 milliGRAM(s) Oral at bedtime  DULoxetine 20 milliGRAM(s) Oral daily  folic acid 1 milliGRAM(s) Oral daily  glucagon  Injectable 1 milliGRAM(s) IntraMuscular once  levothyroxine Injectable 30 MICROGram(s) IV Push at bedtime  memantine 5 milliGRAM(s) Oral at bedtime  midodrine 30 milliGRAM(s) Oral every 8 hours  mirtazapine 7.5 milliGRAM(s) Oral daily  mupirocin 2% Nasal 1 Application(s) Both Nostrils two times a day  Nephro-celeste 1 Tablet(s) Oral daily  pantoprazole  Injectable 40 milliGRAM(s) IV Push every 12 hours  QUEtiapine 50 milliGRAM(s) Oral at bedtime  trihexyphenidyl 2 milliGRAM(s) Oral three times a day    MEDICATIONS  (PRN):  sodium chloride 0.9% lock flush 10 milliLiter(s) IV Push every 1 hour PRN Pre/post blood products, medications, blood draw, and to maintain line patency      ITEMS UNCHECKED ARE NOT PRESENT    PRESENT SYMPTOMS: [ ]Unable to obtain due to poor mentation   Source if other than patient:  [ ]Family   [x]Team     Pain:  [ ]yes [x]no  QOL impact -   Location -                    Aggravating factors -  Quality -  Radiation -  Timing-  Severity (0-10 scale):  Minimal acceptable level (0-10 scale):     Dyspnea:                           [ ]Mild [ ]Moderate [ ]Severe  Anxiety:                             [ ]Mild [ ]Moderate [ ]Severe  Fatigue:                             [ ]Mild [ ]Moderate [ ]Severe  Nausea:                             [ ]Mild [ ]Moderate [ ]Severe  Loss of appetite:              [ ]Mild [ ]Moderate [ ]Severe  Constipation:                    [ ]Mild [ ]Moderate [ ]Severe    CPOT:    https://www.[facility].org/getattachment/[email or web address]/Critical-Care-Pain-Observation-Tool-(CPOT)    PAIN AD Score:	  http://geriatrictoolkit.[facility]/cog/painad.pdf (Ctrl + left click to view)    Other Symptoms:  [ ]All other review of systems negative     Palliative Performance Status Version 2:         %      http://npcrc.org/files/news/palliative_performance_scale_ppsv2.pdf  PHYSICAL EXAM:  Vital Signs Last 24 Hrs  T(C): 36 (2022 07:00), Max: 38.1 (2022 16:00)  T(F): 96.8 (2022 07:00), Max: 100.6 (2022 16:00)  HR: 56 (2022 10:15) (55 - 109)  BP: --  BP(mean): --  RR: 14 (2022 10:15) (11 - 33)  SpO2: 100% (2022 10:15) (98% - 100%) I&O's Summary    2022 07:01  -  2022 07:00  --------------------------------------------------------  IN: 1308.1 mL / OUT: 0 mL / NET: 1308.1 mL    2022 07:01  -  2022 11:17  --------------------------------------------------------  IN: 140 mL / OUT: 0 mL / NET: 140 mL       GENERAL:  [ ]Alert  [ ]Oriented x   [ ]Lethargic  [ ]Cachexia  [ ]Unarousable  [ ]Verbal  [ ]Non-Verbal  Behavioral:   [ ]Anxiety  [ ]Delirium [ ]Agitation [ ]Other  HEENT:  [ ]Normal   [ ]Dry mouth   [ ]ET Tube/Trach  [ ]Oral lesions  PULMONARY:   [ ]Clear [ ]Tachypnea  [ ]Audible excessive secretions   [ ]Rhonchi        [ ]Right [ ]Left [ ]Bilateral  [ ]Crackles        [ ]Right [ ]Left [ ]Bilateral  [ ]Wheezing     [ ]Right [ ]Left [ ]Bilateral  [ ]Diminished BS [ ] Right [ ]Left [ ]Bilateral  CARDIOVASCULAR:    [ ]Regular [ ]Irregular [ ]Tachy  [ ]Trevon [ ]Murmur [ ]Other  GASTROINTESTINAL:  [ ]Soft  [ ]Distended   [ ]+BS  [ ]Non tender [ ]Tender  [ ]PEG [ ]OGT/ NGT   Last BM:    GENITOURINARY:  [ ]Normal [ ]Incontinent   [ ]Oliguria/Anuria   [ ]Javier  MUSCULOSKELETAL:   [ ]Normal   [ ]Weakness  [ ]Bed/Wheelchair bound [ ]Edema  NEUROLOGIC:   [ ]No focal deficits  [ ] Cognitive impairment  [ ] Dysphagia [ ]Dysarthria [ ] Paresis [ ]Other   SKIN:   [ ]Normal  [ ]Rash   [ ]Pressure ulcer(s) [ ]y [ ]n present on admission    CRITICAL CARE:  [ ]Shock Present  [ ]Septic [ ]Cardiogenic [ ]Neurologic [ ]Hypovolemic  [ ]Vasopressors [ ]Inotropes  [ ]Respiratory failure present [ ]Mechanical Ventilation [ ]Non-invasive ventilatory support [ ]High-Flow  [ ]Acute  [ ]Chronic [ ]Hypoxic  [ ]Hypercarbic [ ]Other  [ ]Other organ failure     LABS:                        8.9    10.10 )-----------( 241      ( 2022 00:49 )             28.3   02-24    132<L>  |  98  |  23  ----------------------------<  189<H>  3.6   |  23  |  2.73<H>    Ca    8.6      2022 00:18  Phos  3.5     -  Mg     1.8         TPro  5.8<L>  /  Alb  1.7<L>  /  TBili  0.3  /  DBili  x   /  AST  31  /  ALT  <5<L>  /  AlkPhos  135<H>  -      Urinalysis Basic - ( 2022 06:19 )    Color: Light Yellow / Appearance: Clear / S.008 / pH: x  Gluc: x / Ketone: Negative  / Bili: Negative / Urobili: Negative   Blood: x / Protein: 30 mg/dL / Nitrite: Negative   Leuk Esterase: Large / RBC: 3 /hpf / WBC 19 /HPF   Sq Epi: x / Non Sq Epi: 1 /hpf / Bacteria: Negative      RADIOLOGY & ADDITIONAL STUDIES:    Protein Calorie Malnutrition Present: [ ]mild [ ]moderate [ ]severe [ ]underweight [ ]morbid obesity  https://www.andeal.org/vault/2440/web/files/ONC/Table_Clinical%20Characteristics%20to%20Document%20Malnutrition-White%20JV%20et%20al%2020.pdf    Height (cm): 180.3 (22 @ 17:35)  Weight (kg): 96.4 (22 @ 17:35)  BMI (kg/m2): 29.7 (22 @ 17:35)    [ ]PPSV2 < or = 30%  [ ]significant weight loss [ ]poor nutritional intake [ ]anasarca    [ ]Artificial Nutrition    REFERRALS:   [ ]Chaplaincy  [ ]Hospice  [ ]Child Life  [ ]Social Work  [ ]Case management [ ]Holistic Therapy     Goals of Care Document:         SUBJECTIVE AND OBJECTIVE: Pt seen and examined at bedside. Remains intubated. Receiving HD at bedside. Opens eyes but continues to have poor mentation and unable to provide history.  INTERVAL HPI/OVERNIGHT EVENTS: No acute events reported.    DNR on chart:   Allergies    adhesives (Rash)  latex (Urticaria)  No Known Drug Allergies    Intolerances    MEDICATIONS  (STANDING):  atorvastatin 80 milliGRAM(s) Oral at bedtime  carbidopa/levodopa  25/100 2.5 Tablet(s) Oral <User Schedule>  carbidopa/levodopa  25/100 2 Tablet(s) Oral <User Schedule>  chlorhexidine 0.12% Liquid 15 milliLiter(s) Oral Mucosa every 12 hours  chlorhexidine 4% Liquid 1 Application(s) Topical <User Schedule>  dexMEDEtomidine Infusion 0.4 MICROgram(s)/kG/Hr (9.64 mL/Hr) IV Continuous <Continuous>  dextrose 40% Gel 15 Gram(s) Oral once  dextrose 5%. 1000 milliLiter(s) (50 mL/Hr) IV Continuous <Continuous>  dextrose 5%. 1000 milliLiter(s) (100 mL/Hr) IV Continuous <Continuous>  dextrose 50% Injectable 25 Gram(s) IV Push once  dextrose 50% Injectable 12.5 Gram(s) IV Push once  dextrose 50% Injectable 25 Gram(s) IV Push once  diVALproex Sprinkle 250 milliGRAM(s) Oral three times a day  doxazosin 1 milliGRAM(s) Oral at bedtime  DULoxetine 20 milliGRAM(s) Oral daily  folic acid 1 milliGRAM(s) Oral daily  glucagon  Injectable 1 milliGRAM(s) IntraMuscular once  levothyroxine Injectable 30 MICROGram(s) IV Push at bedtime  memantine 5 milliGRAM(s) Oral at bedtime  midodrine 30 milliGRAM(s) Oral every 8 hours  mirtazapine 7.5 milliGRAM(s) Oral daily  mupirocin 2% Nasal 1 Application(s) Both Nostrils two times a day  Nephro-celeste 1 Tablet(s) Oral daily  pantoprazole  Injectable 40 milliGRAM(s) IV Push every 12 hours  QUEtiapine 50 milliGRAM(s) Oral at bedtime  trihexyphenidyl 2 milliGRAM(s) Oral three times a day    MEDICATIONS  (PRN):  sodium chloride 0.9% lock flush 10 milliLiter(s) IV Push every 1 hour PRN Pre/post blood products, medications, blood draw, and to maintain line patency      ITEMS UNCHECKED ARE NOT PRESENT    PRESENT SYMPTOMS: [ ]Unable to obtain due to poor mentation   Source if other than patient:  [ ]Family   [x]Team     Pain:  [ ]yes [x]no  QOL impact -   Location -                    Aggravating factors -  Quality -  Radiation -  Timing-  Severity (0-10 scale):  Minimal acceptable level (0-10 scale):     Dyspnea:                           [ ]Mild [ ]Moderate [ ]Severe  Anxiety:                             [ ]Mild [ ]Moderate [ ]Severe  Fatigue:                             [ ]Mild [ ]Moderate [ ]Severe  Nausea:                             [ ]Mild [ ]Moderate [ ]Severe  Loss of appetite:              [ ]Mild [ ]Moderate [ ]Severe  Constipation:                    [ ]Mild [ ]Moderate [ ]Severe    CPOT:    https://www.[facility].org/getattachment/[email or web address]/Critical-Care-Pain-Observation-Tool-(CPOT)    PAIN AD Score:	  http://geriatrictoolkit.[facility]/cog/painad.pdf (Ctrl + left click to view)    Other Symptoms:  [x]All other review of systems unable to obtain due to poor mentation    Palliative Performance Status Version 2:     10    %      http://npcrc.org/files/news/palliative_performance_scale_ppsv2.pdf  PHYSICAL EXAM:  Vital Signs Last 24 Hrs  T(C): 36 (2022 07:00), Max: 38.1 (2022 16:00)  T(F): 96.8 (2022 07:00), Max: 100.6 (2022 16:00)  HR: 56 (2022 10:15) (55 - 109)  BP: --  BP(mean): --  RR: 14 (2022 10:15) (11 - 33)  SpO2: 100% (2022 10:15) (98% - 100%) I&O's Summary    2022 07:01  -  2022 07:00  --------------------------------------------------------  IN: 1308.1 mL / OUT: 0 mL / NET: 1308.1 mL    2022 07:01  -  2022 11:17  --------------------------------------------------------  IN: 140 mL / OUT: 0 mL / NET: 140 mL       GENERAL:  Intubated but able to open eyes, frail  [ ]Alert  [ ]Oriented x 3  [ ]Lethargic  [ ]Cachexia  [ ]Unarousable  [ ]Verbal  [x]Non-Verbal  Behavioral:   [ ]Anxiety  [ ]Delirium [ ]Agitation [ ]Other  HEENT:  [x]Normal   [ ]Dry mouth   [ ]ET Tube/Trach  [ ]Oral lesions  PULMONARY:   [x]Clear [ ]Tachypnea  [ ]Audible excessive secretions   [ ]Rhonchi        [ ]Right [ ]Left [ ]Bilateral  [ ]Crackles        [ ]Right [ ]Left [ ]Bilateral  [ ]Wheezing     [ ]Right [ ]Left [ ]Bilateral  [ ]Diminished BS [ ] Right [ ]Left [ ]Bilateral  CARDIOVASCULAR:    [x]Regular [ ]Irregular [ ]Tachy  [ ]Trevon [ ]Murmur [ ]Other  GASTROINTESTINAL:  [x]Soft  [ ]Distended   [x]+BS  [x]Non tender [ ]Tender  [ ]PEG [ ]OGT/ NGT   Last BM:    GENITOURINARY:  [x]Normal [ ]Incontinent   [ ]Oliguria/Anuria   [ ]Javier  MUSCULOSKELETAL:   [ ]Normal   [x]Weakness  [ ]Bed/Wheelchair bound [ ]Edema  NEUROLOGIC:   [x]No focal deficits  [ ] Cognitive impairment  [ ] Dysphagia [ ]Dysarthria [ ] Paresis [ ]Other   SKIN:   [x]Normal  [ ]Rash   [ ]Pressure ulcer(s) [ ]y [ ]n present on admission    CRITICAL CARE:  [ ]Shock Present  [ ]Septic [ ]Cardiogenic [ ]Neurologic [ ]Hypovolemic  [ ]Vasopressors [ ]Inotropes  [ ]Respiratory failure present [ ]Mechanical Ventilation [ ]Non-invasive ventilatory support [ ]High-Flow  [ ]Acute  [ ]Chronic [ ]Hypoxic  [ ]Hypercarbic [ ]Other  [ ]Other organ failure     LABS:                        8.9    10.10 )-----------( 241      ( 2022 00:49 )             28.3       132<L>  |  98  |  23  ----------------------------<  189<H>  3.6   |  23  |  2.73<H>    Ca    8.6      2022 00:18  Phos  3.5       Mg     1.8         TPro  5.8<L>  /  Alb  1.7<L>  /  TBili  0.3  /  DBili  x   /  AST  31  /  ALT  <5<L>  /  AlkPhos  135<H>        Urinalysis Basic - ( 2022 06:19 )    Color: Light Yellow / Appearance: Clear / S.008 / pH: x  Gluc: x / Ketone: Negative  / Bili: Negative / Urobili: Negative   Blood: x / Protein: 30 mg/dL / Nitrite: Negative   Leuk Esterase: Large / RBC: 3 /hpf / WBC 19 /HPF   Sq Epi: x / Non Sq Epi: 1 /hpf / Bacteria: Negative      RADIOLOGY & ADDITIONAL STUDIES:    Protein Calorie Malnutrition Present: [ ]mild [ ]moderate [ ]severe [ ]underweight [ ]morbid obesity  https://www.andeal.org/vault/2440/web/files/ONC/Table_Clinical%20Characteristics%20to%20Document%20Malnutrition-White%20JV%20et%20al%2020.pdf    Height (cm): 180.3 (22 @ 17:35)  Weight (kg): 96.4 (22 @ 17:35)  BMI (kg/m2): 29.7 (22 @ 17:35)    [ ]PPSV2 < or = 30%  [ ]significant weight loss [ ]poor nutritional intake [ ]anasarca    [ ]Artificial Nutrition    REFERRALS:   [ ]Chaplaincy  [ ]Hospice  [ ]Child Life  [ ]Social Work  [ ]Case management [ ]Holistic Therapy     Goals of Care Document:         SUBJECTIVE AND OBJECTIVE: Pt seen and examined at bedside. Remains intubated. Receiving HD at bedside. Opens eyes but continues to have poor mentation and unable to provide history.  INTERVAL HPI/OVERNIGHT EVENTS: No acute events reported.    DNR on chart:   Allergies    adhesives (Rash)  latex (Urticaria)  No Known Drug Allergies    Intolerances    MEDICATIONS  (STANDING):  atorvastatin 80 milliGRAM(s) Oral at bedtime  carbidopa/levodopa  25/100 2.5 Tablet(s) Oral <User Schedule>  carbidopa/levodopa  25/100 2 Tablet(s) Oral <User Schedule>  chlorhexidine 0.12% Liquid 15 milliLiter(s) Oral Mucosa every 12 hours  chlorhexidine 4% Liquid 1 Application(s) Topical <User Schedule>  dexMEDEtomidine Infusion 0.4 MICROgram(s)/kG/Hr (9.64 mL/Hr) IV Continuous <Continuous>  dextrose 40% Gel 15 Gram(s) Oral once  dextrose 5%. 1000 milliLiter(s) (50 mL/Hr) IV Continuous <Continuous>  dextrose 5%. 1000 milliLiter(s) (100 mL/Hr) IV Continuous <Continuous>  dextrose 50% Injectable 25 Gram(s) IV Push once  dextrose 50% Injectable 12.5 Gram(s) IV Push once  dextrose 50% Injectable 25 Gram(s) IV Push once  diVALproex Sprinkle 250 milliGRAM(s) Oral three times a day  doxazosin 1 milliGRAM(s) Oral at bedtime  DULoxetine 20 milliGRAM(s) Oral daily  folic acid 1 milliGRAM(s) Oral daily  glucagon  Injectable 1 milliGRAM(s) IntraMuscular once  levothyroxine Injectable 30 MICROGram(s) IV Push at bedtime  memantine 5 milliGRAM(s) Oral at bedtime  midodrine 30 milliGRAM(s) Oral every 8 hours  mirtazapine 7.5 milliGRAM(s) Oral daily  mupirocin 2% Nasal 1 Application(s) Both Nostrils two times a day  Nephro-celeste 1 Tablet(s) Oral daily  pantoprazole  Injectable 40 milliGRAM(s) IV Push every 12 hours  QUEtiapine 50 milliGRAM(s) Oral at bedtime  trihexyphenidyl 2 milliGRAM(s) Oral three times a day    MEDICATIONS  (PRN):  sodium chloride 0.9% lock flush 10 milliLiter(s) IV Push every 1 hour PRN Pre/post blood products, medications, blood draw, and to maintain line patency      ITEMS UNCHECKED ARE NOT PRESENT    PRESENT SYMPTOMS: [ ]Unable to obtain due to poor mentation   Source if other than patient:  [ ]Family   [x]Team     Pain:  [ ]yes [x]no  QOL impact -   Location -                    Aggravating factors -  Quality -  Radiation -  Timing-  Severity (0-10 scale):  Minimal acceptable level (0-10 scale):     Dyspnea:                           [ ]Mild [ ]Moderate [ ]Severe  Anxiety:                             [ ]Mild [ ]Moderate [ ]Severe  Fatigue:                             [ ]Mild [ ]Moderate [ ]Severe  Nausea:                             [ ]Mild [ ]Moderate [ ]Severe  Loss of appetite:              [ ]Mild [ ]Moderate [ ]Severe  Constipation:                    [ ]Mild [ ]Moderate [ ]Severe    CPOT:    https://www.[facility].org/getattachment/[email or web address]/Critical-Care-Pain-Observation-Tool-(CPOT)    PAIN AD Score:	0  http://geriatrictoolkit.[facility]/cog/painad.pdf (Ctrl + left click to view)    Other Symptoms:  [x]All other review of systems unable to obtain due to poor mentation    Palliative Performance Status Version 2:     10    %      http://npcrc.org/files/news/palliative_performance_scale_ppsv2.pdf  PHYSICAL EXAM:  Vital Signs Last 24 Hrs  T(C): 36 (2022 07:00), Max: 38.1 (2022 16:00)  T(F): 96.8 (2022 07:00), Max: 100.6 (2022 16:00)  HR: 56 (2022 10:15) (55 - 109)  BP: --  BP(mean): --  RR: 14 (2022 10:15) (11 - 33)  SpO2: 100% (2022 10:15) (98% - 100%) I&O's Summary    2022 07:01  -  2022 07:00  --------------------------------------------------------  IN: 1308.1 mL / OUT: 0 mL / NET: 1308.1 mL    2022 07:01  -  2022 11:17  --------------------------------------------------------  IN: 140 mL / OUT: 0 mL / NET: 140 mL       GENERAL:  Intubated but able to open eyes, frail  [ ]Alert  [ ]Oriented x 3  [ ]Lethargic  [ ]Cachexia  [ ]Unarousable  [ ]Verbal  [x]Non-Verbal  Behavioral:   [ ]Anxiety  [ ]Delirium [ ]Agitation [ ]Other  HEENT:  [ ]Normal   [ ]Dry mouth   [x]ET Tube/Trach  [ ]Oral lesions  PULMONARY:   [x]Clear [ ]Tachypnea  [ ]Audible excessive secretions   [ ]Rhonchi        [ ]Right [ ]Left [ ]Bilateral  [ ]Crackles        [ ]Right [ ]Left [ ]Bilateral  [ ]Wheezing     [ ]Right [ ]Left [ ]Bilateral  [ ]Diminished BS [ ] Right [ ]Left [ ]Bilateral  CARDIOVASCULAR:    [x]Regular [ ]Irregular [ ]Tachy  [ ]Trevon [ ]Murmur [ ]Other  GASTROINTESTINAL:  [x]Soft  [ ]Distended   [x]+BS  [x]Non tender [ ]Tender  [x]PEG [ ]OGT/ NGT   Last BM:    GENITOURINARY:  [x]Normal [ ]Incontinent   [x ]Oliguria/Anuria   [ ]Javier  MUSCULOSKELETAL:   [ ]Normal   [ ]Weakness  [x]Bed/Wheelchair bound [ ]Edema  NEUROLOGIC:   [ ]No focal deficits  [x] Cognitive impairment  [ ] Dysphagia [ ]Dysarthria [ ] Paresis [ ]Other   SKIN:   [ ]Normal  [ ]Rash   [ ]Pressure ulcer(s) [ ]y [ ]n present on admission    CRITICAL CARE:  [ ]Shock Present  [ ]Septic [ ]Cardiogenic [ ]Neurologic [ ]Hypovolemic  [ ]Vasopressors [ ]Inotropes  [x ]Respiratory failure present [x]Mechanical Ventilation [ ]Non-invasive ventilatory support [ ]High-Flow  [ ]Acute  [ ]Chronic [ ]Hypoxic  [ ]Hypercarbic [ ]Other  [x]Other organ failure -renal    LABS:                        8.9    10.10 )-----------( 241      ( 2022 00:49 )             28.3       132<L>  |  98  |  23  ----------------------------<  189<H>  3.6   |  23  |  2.73<H>    Ca    8.6      2022 00:18  Phos  3.5       Mg     1.8         TPro  5.8<L>  /  Alb  1.7<L>  /  TBili  0.3  /  DBili  x   /  AST  31  /  ALT  <5<L>  /  AlkPhos  135<H>        Urinalysis Basic - ( 2022 06:19 )    Color: Light Yellow / Appearance: Clear / S.008 / pH: x  Gluc: x / Ketone: Negative  / Bili: Negative / Urobili: Negative   Blood: x / Protein: 30 mg/dL / Nitrite: Negative   Leuk Esterase: Large / RBC: 3 /hpf / WBC 19 /HPF   Sq Epi: x / Non Sq Epi: 1 /hpf / Bacteria: Negative      RADIOLOGY & ADDITIONAL STUDIES: No new imaging    Protein Calorie Malnutrition Present: [ ]mild [ ]moderate [ ]severe [ ]underweight [ ]morbid obesity  https://www.andeal.org/vault/2440/web/files/ONC/Table_Clinical%20Characteristics%20to%20Document%20Malnutrition-White%20JV%20et%20al%2020.pdf    Height (cm): 180.3 (22 @ 17:35)  Weight (kg): 96.4 (22 @ 17:35)  BMI (kg/m2): 29.7 (22 @ 17:35)    [ ]PPSV2 < or = 30%  [ ]significant weight loss [ ]poor nutritional intake [ ]anasarca    [ ]Artificial Nutrition    REFERRALS:   [ ]Chaplaincy  [ ]Hospice  [ ]Child Life  [ ]Social Work  [ ]Case management [ ]Holistic Therapy     Goals of Care Document:

## 2022-02-24 NOTE — AIRWAY REMOVAL NOTE  ADULT & PEDS - ARTIFICAL AIRWAY REMOVAL COMMENTS
Written order for extubation verified. The patient was identified by full name and birth date compared to the identification band. Present during the procedure was Gretchen Elliott RN

## 2022-02-24 NOTE — PROGRESS NOTE ADULT - ASSESSMENT
71 M w volume overload, GI consulted for drop in hgb    1. GI Bleeding  -s/p endoscopy 2/16 which showed spurting duodenal ulcer; ulcer injected with epi for hemostasis, cautery and hemospray used.  -hgb 8.9, now having black loose stools   -high dose PPI  -TF restarted   -if bleeding occurs again would need embolization by IR  -monitor for GI bleeding  -trend CBCs  -patient now comfort care after palliative care discussion with family     2. ABBY on CKD per renal  -on HD via permacath per renal, midodrine during dialysis  -AVF not functional   -s/p tunnelled HD catheter insertion by IR 2/10    3. RP bleed  -s/p Abdominal Angiography and Embolization on 10/29/2021 by Interventional radiology of l4and l5 lumbar artery     3. C-diff, no BM since 2/20, TF restarted this am, will f/u on BMs  -on vanco     Attending supervision statement: I have personally seen and examined the patient. I fully participated in the care of this patient. I have made amendments to the documentation where necessary, and agree with the history, physical exam, and plan as outlined by the ACP.      [facility]  Gastroenterology and Hepatology  [address] [place], [place]  Office: [phone]  Cell: [phone]

## 2022-02-24 NOTE — PROGRESS NOTE ADULT - SUBJECTIVE AND OBJECTIVE BOX
Chief complaint  Patient is a 71y old  Male who presents with a chief complaint of leg swelling (24 Feb 2022 12:51)   Review of systems  Patient seen this AM, intubated, no hypoglycemic episodes.    Labs and Fingersticks  CAPILLARY BLOOD GLUCOSE      POCT Blood Glucose.: 162 mg/dL (23 Feb 2022 17:29)      Anion Gap, Serum: 11 (02-24 @ 00:18)  Anion Gap, Serum: 12 (02-23 @ 00:10)      Calcium, Total Serum: 8.6 (02-24 @ 00:18)  Calcium, Total Serum: 8.3 *L* (02-23 @ 00:10)  Albumin, Serum: 1.7 *L* (02-24 @ 00:18)  Albumin, Serum: 1.8 *L* (02-23 @ 00:10)    Alanine Aminotransferase (ALT/SGPT): <5 *L* (02-24 @ 00:18)  Alanine Aminotransferase (ALT/SGPT): <5 *L* (02-23 @ 00:10)  Alkaline Phosphatase, Serum: 135 *H* (02-24 @ 00:18)  Alkaline Phosphatase, Serum: 108 (02-23 @ 00:10)  Aspartate Aminotransferase (AST/SGOT): 31 (02-24 @ 00:18)  Aspartate Aminotransferase (AST/SGOT): 26 (02-23 @ 00:10)        02-24    132<L>  |  98  |  23  ----------------------------<  189<H>  3.6   |  23  |  2.73<H>    Ca    8.6      24 Feb 2022 00:18  Phos  3.5     02-24  Mg     1.8     02-24    TPro  5.8<L>  /  Alb  1.7<L>  /  TBili  0.3  /  DBili  x   /  AST  31  /  ALT  <5<L>  /  AlkPhos  135<H>  02-24                        8.9    10.10 )-----------( 241      ( 24 Feb 2022 00:49 )             28.3     Medications  MEDICATIONS  (STANDING):  carbidopa/levodopa  25/100 2.5 Tablet(s) Oral <User Schedule>  carbidopa/levodopa  25/100 2 Tablet(s) Oral <User Schedule>  chlorhexidine 4% Liquid 1 Application(s) Topical <User Schedule>  dexMEDEtomidine Infusion 0.4 MICROgram(s)/kG/Hr (9.64 mL/Hr) IV Continuous <Continuous>  dextrose 40% Gel 15 Gram(s) Oral once  dextrose 5%. 1000 milliLiter(s) (50 mL/Hr) IV Continuous <Continuous>  dextrose 5%. 1000 milliLiter(s) (100 mL/Hr) IV Continuous <Continuous>  dextrose 50% Injectable 25 Gram(s) IV Push once  dextrose 50% Injectable 12.5 Gram(s) IV Push once  dextrose 50% Injectable 25 Gram(s) IV Push once  diVALproex Sprinkle 250 milliGRAM(s) Oral three times a day  doxazosin 1 milliGRAM(s) Oral at bedtime  DULoxetine 20 milliGRAM(s) Oral daily  folic acid 1 milliGRAM(s) Oral daily  glucagon  Injectable 1 milliGRAM(s) IntraMuscular once  levothyroxine Injectable 30 MICROGram(s) IV Push at bedtime  memantine 5 milliGRAM(s) Oral at bedtime  midodrine 30 milliGRAM(s) Oral every 8 hours  mirtazapine 7.5 milliGRAM(s) Oral daily  mupirocin 2% Nasal 1 Application(s) Both Nostrils two times a day  Nephro-celeste 1 Tablet(s) Oral daily  pantoprazole  Injectable 40 milliGRAM(s) IV Push every 12 hours  QUEtiapine 50 milliGRAM(s) Oral at bedtime  trihexyphenidyl 2 milliGRAM(s) Oral three times a day      Physical Exam  Vital Signs Last 12 Hrs  T(F): 96.8 (02-24-22 @ 07:00), Max: 97.5 (02-24-22 @ 04:00)  HR: 56 (02-24-22 @ 13:00) (55 - 110)  BP: --  BP(mean): --  RR: 21 (02-24-22 @ 13:00) (11 - 24)  SpO2: 100% (02-24-22 @ 13:00) (82% - 100%)

## 2022-02-24 NOTE — PROGRESS NOTE ADULT - SUBJECTIVE AND OBJECTIVE BOX
MICU PROGRESS NOTE    INTERVAL HPI/OVERNIGHT EVENTS:  none    SUBJECTIVE:   n/a    OBJECTIVE:  Pt eyes open and tracking, moves his mouth in response to a question.  Had BM today.    VITAL SIGNS:  ICU Vital Signs Last 24 Hrs  T(C): 36.4 (2022 04:00), Max: 38.1 (2022 16:00)  T(F): 97.5 (2022 04:00), Max: 100.6 (2022 16:00)  HR: 56 (2022 06:00) (56 - 119)  BP: --  BP(mean): --  ABP: 120/55 (2022 06:00) (102/46 - 137/59)  ABP(mean): 78 (2022 06:00) (63 - 86)  RR: 17 (:00) (11 - 33)  SpO2: 100% (:00) (98% - 100%)      VENTS:  Mode: AC/ CMV (Assist Control/ Continuous Mandatory Ventilation), RR (machine): 8, TV (machine): 400, FiO2: 21, PEEP: 5, ITime: 1, MAP: 8, PIP: 21    I&O:    - @ 07:01  -  02-24 @ 07:00  --------------------------------------------------------  IN: 1298.5 mL / OUT: 0 mL / NET: 1298.5 mL        PHYSICAL EXAM:    GENERAL: Intubated, awake and alert  CHEST/LUNG: Clear to auscultation bilaterally; No crackles, rhonchi, wheezing, or rubs +int  HEART: bradycardia, No murmurs, rubs, or gallops  ABDOMEN: Soft, Nontender, Nondistended; Bowel sounds present +PEG tube  EXTREMITIES: 2+ pitting edema in dependent areas  SKIN: No rashes or lesions  NERVOUS SYSTEM:  Alert & Oriented x0, follows commands to squeeze hands and wiggle toes  LINES:                                              MEDICATIONS  (STANDING):  atorvastatin 80 milliGRAM(s) Oral at bedtime  carbidopa/levodopa  25/100 2.5 Tablet(s) Oral <User Schedule>  carbidopa/levodopa  25/100 2 Tablet(s) Oral <User Schedule>  chlorhexidine 0.12% Liquid 15 milliLiter(s) Oral Mucosa every 12 hours  chlorhexidine 4% Liquid 1 Application(s) Topical <User Schedule>  dexMEDEtomidine Infusion 0.4 MICROgram(s)/kG/Hr (9.64 mL/Hr) IV Continuous <Continuous>  dextrose 40% Gel 15 Gram(s) Oral once  dextrose 5%. 1000 milliLiter(s) (50 mL/Hr) IV Continuous <Continuous>  dextrose 5%. 1000 milliLiter(s) (100 mL/Hr) IV Continuous <Continuous>  dextrose 50% Injectable 25 Gram(s) IV Push once  dextrose 50% Injectable 12.5 Gram(s) IV Push once  dextrose 50% Injectable 25 Gram(s) IV Push once  diVALproex Sprinkle 250 milliGRAM(s) Oral three times a day  doxazosin 1 milliGRAM(s) Oral at bedtime  DULoxetine 20 milliGRAM(s) Oral daily  folic acid 1 milliGRAM(s) Oral daily  glucagon  Injectable 1 milliGRAM(s) IntraMuscular once  levothyroxine Injectable 30 MICROGram(s) IV Push at bedtime  memantine 5 milliGRAM(s) Oral at bedtime  midodrine 30 milliGRAM(s) Oral every 8 hours  mirtazapine 7.5 milliGRAM(s) Oral daily  mupirocin 2% Nasal 1 Application(s) Both Nostrils two times a day  Nephro-celeste 1 Tablet(s) Oral daily  pantoprazole  Injectable 40 milliGRAM(s) IV Push every 12 hours  QUEtiapine 50 milliGRAM(s) Oral at bedtime  trihexyphenidyl 2 milliGRAM(s) Oral three times a day    MEDICATIONS  (PRN):  sodium chloride 0.9% lock flush 10 milliLiter(s) IV Push every 1 hour PRN Pre/post blood products, medications, blood draw, and to maintain line patency      ALLERGIES:  Allergies    adhesives (Rash)  latex (Urticaria)  No Known Drug Allergies    Intolerances        LABS:                        8.9    10.10 )-----------( 241      ( 2022 00:49 )             28.3         132<L>  |  98  |  23  ----------------------------<  189<H>  3.6   |  23  |  2.73<H>    Ca    8.6      2022 00:18  Phos  3.5       Mg     1.8         TPro  5.8<L>  /  Alb  1.7<L>  /  TBili  0.3  /  DBili  x   /  AST  31  /  ALT  <5<L>  /  AlkPhos  135<H>        Urinalysis Basic - ( 2022 06:19 )    Color: Light Yellow / Appearance: Clear / S.008 / pH: x  Gluc: x / Ketone: Negative  / Bili: Negative / Urobili: Negative   Blood: x / Protein: 30 mg/dL / Nitrite: Negative   Leuk Esterase: Large / RBC: 3 /hpf / WBC 19 /HPF   Sq Epi: x / Non Sq Epi: 1 /hpf / Bacteria: Negative        Culture - Sputum (collected 22 @ 12:29)  Source: .Sputum Sputum  Gram Stain (22 @ 15:25):    Numerous polymorphonuclear leukocytes per low power field    Few Squamous epithelial cells per low power field    Moderate Yeast like cells per oil power field    Moderate Gram Negative Rods per oil power field      CAPILLARY BLOOD GLUCOSE      POCT Blood Glucose.: 162 mg/dL (2022 17:29)      RADIOLOGY & ADDITIONAL TESTS: Reviewed. MICU PROGRESS NOTE    INTERVAL HPI/OVERNIGHT EVENTS:  none    SUBJECTIVE:   n/a    OBJECTIVE:  Pt eyes open and tracking, moves his mouth in response to a question.  Had BM today.    VITAL SIGNS:  ICU Vital Signs Last 24 Hrs  T(C): 36.4 (2022 04:00), Max: 38.1 (2022 16:00)  T(F): 97.5 (2022 04:00), Max: 100.6 (2022 16:00)  HR: 56 (2022 06:00) (56 - 119)  BP: --  BP(mean): --  ABP: 120/55 (2022 06:00) (102/46 - 137/59)  ABP(mean): 78 (2022 06:00) (63 - 86)  RR: 17 (:00) (11 - 33)  SpO2: 100% (:00) (98% - 100%)      VENTS:  Mode: AC/ CMV (Assist Control/ Continuous Mandatory Ventilation), RR (machine): 8, TV (machine): 400, FiO2: 21, PEEP: 5, ITime: 1, MAP: 8, PIP: 21    I&O:    - @ 07:01  -  02-24 @ 07:00  --------------------------------------------------------  IN: 1298.5 mL / OUT: 0 mL / NET: 1298.5 mL        PHYSICAL EXAM:    GENERAL: Intubated, awake and alert  CHEST/LUNG: Clear to auscultation bilaterally; No crackles, rhonchi, wheezing, or rubs +int  HEART: bradycardia, No murmurs, rubs, or gallops  ABDOMEN: Soft, Nontender, Nondistended; Bowel sounds present +PEG tube  EXTREMITIES: 2+ pitting edema in dependent areas  SKIN: No rashes or lesions  NERVOUS SYSTEM:  Alert & Oriented x0, follows commands to squeeze hands and wiggle toes  LINES:                     a line                         MEDICATIONS  (STANDING):  atorvastatin 80 milliGRAM(s) Oral at bedtime  carbidopa/levodopa  25/100 2.5 Tablet(s) Oral <User Schedule>  carbidopa/levodopa  25/100 2 Tablet(s) Oral <User Schedule>  chlorhexidine 0.12% Liquid 15 milliLiter(s) Oral Mucosa every 12 hours  chlorhexidine 4% Liquid 1 Application(s) Topical <User Schedule>  dexMEDEtomidine Infusion 0.4 MICROgram(s)/kG/Hr (9.64 mL/Hr) IV Continuous <Continuous>  dextrose 40% Gel 15 Gram(s) Oral once  dextrose 5%. 1000 milliLiter(s) (50 mL/Hr) IV Continuous <Continuous>  dextrose 5%. 1000 milliLiter(s) (100 mL/Hr) IV Continuous <Continuous>  dextrose 50% Injectable 25 Gram(s) IV Push once  dextrose 50% Injectable 12.5 Gram(s) IV Push once  dextrose 50% Injectable 25 Gram(s) IV Push once  diVALproex Sprinkle 250 milliGRAM(s) Oral three times a day  doxazosin 1 milliGRAM(s) Oral at bedtime  DULoxetine 20 milliGRAM(s) Oral daily  folic acid 1 milliGRAM(s) Oral daily  glucagon  Injectable 1 milliGRAM(s) IntraMuscular once  levothyroxine Injectable 30 MICROGram(s) IV Push at bedtime  memantine 5 milliGRAM(s) Oral at bedtime  midodrine 30 milliGRAM(s) Oral every 8 hours  mirtazapine 7.5 milliGRAM(s) Oral daily  mupirocin 2% Nasal 1 Application(s) Both Nostrils two times a day  Nephro-celeste 1 Tablet(s) Oral daily  pantoprazole  Injectable 40 milliGRAM(s) IV Push every 12 hours  QUEtiapine 50 milliGRAM(s) Oral at bedtime  trihexyphenidyl 2 milliGRAM(s) Oral three times a day    MEDICATIONS  (PRN):  sodium chloride 0.9% lock flush 10 milliLiter(s) IV Push every 1 hour PRN Pre/post blood products, medications, blood draw, and to maintain line patency      ALLERGIES:  Allergies    adhesives (Rash)  latex (Urticaria)  No Known Drug Allergies    Intolerances        LABS:                        8.9    10.10 )-----------( 241      ( 2022 00:49 )             28.3         132<L>  |  98  |  23  ----------------------------<  189<H>  3.6   |  23  |  2.73<H>    Ca    8.6      2022 00:18  Phos  3.5       Mg     1.8         TPro  5.8<L>  /  Alb  1.7<L>  /  TBili  0.3  /  DBili  x   /  AST  31  /  ALT  <5<L>  /  AlkPhos  135<H>        Urinalysis Basic - ( 2022 06:19 )    Color: Light Yellow / Appearance: Clear / S.008 / pH: x  Gluc: x / Ketone: Negative  / Bili: Negative / Urobili: Negative   Blood: x / Protein: 30 mg/dL / Nitrite: Negative   Leuk Esterase: Large / RBC: 3 /hpf / WBC 19 /HPF   Sq Epi: x / Non Sq Epi: 1 /hpf / Bacteria: Negative        Culture - Sputum (collected 22 @ 12:29)  Source: .Sputum Sputum  Gram Stain (22 @ 15:25):    Numerous polymorphonuclear leukocytes per low power field    Few Squamous epithelial cells per low power field    Moderate Yeast like cells per oil power field    Moderate Gram Negative Rods per oil power field      CAPILLARY BLOOD GLUCOSE      POCT Blood Glucose.: 162 mg/dL (2022 17:29)      RADIOLOGY & ADDITIONAL TESTS: Reviewed.

## 2022-02-24 NOTE — PROGRESS NOTE ADULT - PROBLEM SELECTOR PLAN 3
s/p multiple bleeds  recent endoscopy for bleeding ulcer s/p multiple bleeds  recent endoscopy for bleeding ulcer with cauterization

## 2022-02-24 NOTE — PROGRESS NOTE ADULT - NS PRO AD PATIENT TYPE
Health Care Proxy (HCP)
Health Care Proxy (HCP)/Medical Orders for Life-Sustaining Treatment (MOLST)

## 2022-02-24 NOTE — PROGRESS NOTE ADULT - ASSESSMENT
Assessment  DMT2: 71y Male with DM T2 with hyperglycemia, A1C 6.4%, was on insulin at home, all insulin orders have been DC'd now that patient is for comfort measures only, blood sugars in overall acceptable range/slightly elevated, no new hypoglycemias, for compassionate extubation/comfort care.  Hypothyroidism: Patient has no history thyroid disease, was not on any thyroid supplements, subclinical with low-normal FT4 and lethargy, on synthroid 12.5 mcg IV daily, repeat TFTs show subclinical state, increased to synthroid 30mcg IV.  Hypercalcemia: Started on Sensipar 60mg daily, Ca improving.  CHF: on medications, stable, monitored.  HTN: Controlled,  on antihypertensive medications.  Parkinsons: on meds, monitored.  CKD: Monitor labs/BMP    john kelvin DAMON  [phone]

## 2022-02-24 NOTE — PROGRESS NOTE ADULT - SUBJECTIVE AND OBJECTIVE BOX
Patient is a 71y old  Male who presents with a chief complaint of leg swelling (2022 15:35)      INTERVAL HPI/OVERNIGHT EVENTS: noted  pt seen and examined this am   events noted  intubated, sedated on pressors  son at home      Vital Signs Last 24 Hrs  T(C): 36 (2022 07:00), Max: 37 (2022 20:00)  T(F): 96.8 (2022 07:00), Max: 98.6 (2022 20:00)  HR: 116 (2022 17:40) (55 - 116)  BP: 87/58 (2022 17:40) (87/58 - 93/51)  BP(mean): 64 (2022 15:35) (64 - 64)  RR: 24 (2022 17:40) (11 - 24)  SpO2: 82% (2022 17:40) (82% - 100%)    carbidopa/levodopa  25/100 2.5 Tablet(s) Oral <User Schedule>  carbidopa/levodopa  25/100 2 Tablet(s) Oral <User Schedule>  chlorhexidine 4% Liquid 1 Application(s) Topical <User Schedule>  dextrose 40% Gel 15 Gram(s) Oral once  dextrose 5%. 1000 milliLiter(s) IV Continuous <Continuous>  dextrose 5%. 1000 milliLiter(s) IV Continuous <Continuous>  dextrose 50% Injectable 25 Gram(s) IV Push once  dextrose 50% Injectable 12.5 Gram(s) IV Push once  dextrose 50% Injectable 25 Gram(s) IV Push once  diVALproex Sprinkle 250 milliGRAM(s) Oral three times a day  doxazosin 1 milliGRAM(s) Oral at bedtime  DULoxetine 20 milliGRAM(s) Oral daily  folic acid 1 milliGRAM(s) Oral daily  glucagon  Injectable 1 milliGRAM(s) IntraMuscular once  HYDROmorphone  Injectable 0.5 milliGRAM(s) IV Push every 1 hour PRN  levothyroxine Injectable 30 MICROGram(s) IV Push at bedtime  memantine 5 milliGRAM(s) Oral at bedtime  midodrine 30 milliGRAM(s) Oral every 8 hours  mirtazapine 7.5 milliGRAM(s) Oral daily  mupirocin 2% Nasal 1 Application(s) Both Nostrils two times a day  Nephro-celeste 1 Tablet(s) Oral daily  pantoprazole  Injectable 40 milliGRAM(s) IV Push every 12 hours  QUEtiapine 50 milliGRAM(s) Oral at bedtime  sodium chloride 0.9% lock flush 10 milliLiter(s) IV Push every 1 hour PRN  trihexyphenidyl 2 milliGRAM(s) Oral three times a day      PHYSICAL EXAM:  GENERAL: NAD,   EYES: conjunctiva and sclera clear  ENMT: Moist mucous membranes  NECK: Supple, No JVD, Normal thyroid  CHEST/LUNG: non labored, cta b/l  HEART: Regular rate and rhythm; No murmurs, rubs, or gallops  ABDOMEN: Soft, Nontender, Nondistended; Bowel sounds present  EXTREMITIES:  2+ Peripheral Pulses, No clubbing, cyanosis, or edema  LYMPH: No lymphadenopathy noted  SKIN: No rashes or lesions    Consultant(s) Notes Reviewed:  [x ] YES  [ ] NO  Care Discussed with Consultants/Other Providers [ x] YES  [ ] NO    LABS:                        8.9    10.10 )-----------( 241      ( 2022 00:49 )             28.3     02-24    132<L>  |  98  |  23  ----------------------------<  189<H>  3.6   |  23  |  2.73<H>    Ca    8.6      2022 00:18  Phos  3.5     -24  Mg     1.8     -24    TPro  5.8<L>  /  Alb  1.7<L>  /  TBili  0.3  /  DBili  x   /  AST  31  /  ALT  <5<L>  /  AlkPhos  135<H>  -24      Urinalysis Basic - ( 2022 06:19 )    Color: Light Yellow / Appearance: Clear / S.008 / pH: x  Gluc: x / Ketone: Negative  / Bili: Negative / Urobili: Negative   Blood: x / Protein: 30 mg/dL / Nitrite: Negative   Leuk Esterase: Large / RBC: 3 /hpf / WBC 19 /HPF   Sq Epi: x / Non Sq Epi: 1 /hpf / Bacteria: Negative      CAPILLARY BLOOD GLUCOSE            Urinalysis Basic - ( 2022 06:19 )    Color: Light Yellow / Appearance: Clear / S.008 / pH: x  Gluc: x / Ketone: Negative  / Bili: Negative / Urobili: Negative   Blood: x / Protein: 30 mg/dL / Nitrite: Negative   Leuk Esterase: Large / RBC: 3 /hpf / WBC 19 /HPF   Sq Epi: x / Non Sq Epi: 1 /hpf / Bacteria: Negative        Culture - Sputum (collected 2022 12:29)  Source: .Sputum Sputum  Gram Stain (2022 15:25):    Numerous polymorphonuclear leukocytes per low power field    Few Squamous epithelial cells per low power field    Moderate Yeast like cells per oil power field    Moderate Gram Negative Rods per oil power field  Preliminary Report (2022 11:24):    Moderate Serratia marcescens    Normal Respiratory Nikki present    Culture - Blood (collected 2022 09:38)  Source: .Blood Blood-Peripheral  Preliminary Report (2022 10:01):    No growth to date.    Culture - Blood (collected 2022 09:38)  Source: .Blood Blood-Peripheral  Preliminary Report (2022 10:01):    No growth to date.        RADIOLOGY & ADDITIONAL TESTS:    Imaging Personally Reviewed:  [x ] YES  [ ] NO

## 2022-02-24 NOTE — PROGRESS NOTE ADULT - CONVERSATION DETAILS
Patient unable to participate in goals of care discussion due to cognitive impairment. Met with patient's son and daugther in law to discuss ongoing goals of care. Nikitasheron and Yoly have good understanding of patient's current condition and prolonged hospital course. Yunier shared that patient would not find his condition to be an acceptable quality of life. He stated that he has been hopeful for improvement over the last few months but acknowledge patient has had several setbacks. We discussed that at this point pt is unlikely to make a meaningful recovery to return to his previous baseline. Yunier and Yoly appropriately emotional. They stated at this point they would like to focus of care to be patient's comfort. They wish to withdraw ventilator and stop HD and transition care to symptom directed. We discussed transfer to PCU for end of life care, which they are agreeable to. All questions answered and emotional support provided.    Plan is for compassionate extubation in MICU and transfer to PCU if pt stable after extubation.  MOLST in chart is incomplete. New MOLST completed indicating DNR/DNI/comfort measures.

## 2022-02-24 NOTE — PROGRESS NOTE ADULT - ASSESSMENT
72yo M w/ CAD (s/p CABG 2019), CKD (unknown stage), DM2, Parkinson's Disease, HTN, depression p/w b/l LE edema found to have ARF and NSTEMI w/ new Aflutter started on heparin gtt and HD. Pt had prolonged hospital course c/b hemorrhagic shock 2/2 RP bleed, COVID. Course further complicated by acute blood loss anemia 2/2 duodenal ulcer w/ active bleeding vessel requiring endoscopic intervention. Pt remains intubated post procedure. Palliative reconsulted for ongoing goals of care.

## 2022-02-24 NOTE — PROGRESS NOTE ADULT - ASSESSMENT
72yo M w/ PMHx of CAD (s/p CABG 2019), CKD (unknown stage), DM2, Parkinson's Disease, HTN, depression presents with new onset acute heart failure exacerbation, NSTEMI, started on hep gtt, developed bl RP bleed, acute anemia. sp MICU course for hemorrhagic shock, 10/28 sp IR embolization l4 and l5 lumbar artery. sp permacath and AVF.    # chronic systolic and diastolic heart failure  # ESRD, new HD, AVF not matured, sp permacath  # Atrial flutter  # Parkinson's disease   # delirium  # CAD (coronary artery disease)  # HTN  # DM2 (diabetes mellitus, type 2)  # FTT sp PEG, dislodged and replaced  # C diff colitis  # UGIB due to GDA territory ulcer   # acute resp failure    remains on ventilator, SBT  PPI gtt  NPO  PO vanco for c diff  zosyn  sputum growing serratia and proteus  titrate pressors as tolerated  diuresis  palliative fu- family wish for terminal extubation  appreciate ICU care  W  DVT ppx hold AC    [facility]  [phone]

## 2022-02-24 NOTE — CHART NOTE - NSCHARTNOTEFT_GEN_A_CORE
MICU Transfer Note  ---------------------------    Transfer from: MICU  Transfer to:  (  ) Medicine    (  ) Telemetry    (  ) RCU    ( x ) Palliative    (  ) Stroke Unit    (  ) _______________  Accepting Physician: Dr. Muniz      MICU COURSE    Discharge summary:    70yo M w/ PMHx of HTN,  CAD (s/p CABG 2019), CKD (unknown stage), DM2, Parkinson's Disease, depression, dementia  who presented on 10/21/21 p/w LE swelling, was found to be in new onset acute HF exacerbation and NSTEMI s/p hep gtt, s/p MICU course for hemorrhagic shock 2/2 b/l RP bleed s/p IR embolization l4 and l5 lumbar artery on 10/28. Hospital course c/b COVID & new ESRD-HD as of this admission, s/p PermCath and AVF (maturing), new PEG. Further c/b acute blood loss anemia 2/2 UGIB 2/2 duodenal ulcer w/ active bleeding vessel s/p endoscopy on 2/16 with epi and cauterization, remains intubated post procedure and admitted to MICU for monitoring and management. Pt has had prolonged course here >130d, as described. After further conversations with family (pt's son Branden and pt's daughter-in-law Yoly) they opted for comfort directed care (including stopping HD and blood draws). They feel that he has suffered too long and that earlier in his hospitalization they had hope that he would improve and go to rehab, however his course has been c/b a number of medical conditions. Pt on same day was medically ready to extubate, so in line with both being medically ready and family wanting patient to be extubated regardless, he was extubated 2/24.  Pt extubated to face tent and doing well at time of writing. Family wanted patient to go to PCU and focus on comfort for him.    OBJECTIVE --  Vital Signs Last 24 Hrs  T(C): 36 (24 Feb 2022 07:00), Max: 38.1 (23 Feb 2022 16:00)  T(F): 96.8 (24 Feb 2022 07:00), Max: 100.6 (23 Feb 2022 16:00)  HR: 56 (24 Feb 2022 13:00) (55 - 110)  BP: --  BP(mean): --  RR: 21 (24 Feb 2022 13:00) (11 - 24)  SpO2: 100% (24 Feb 2022 13:00) (82% - 100%)    I&O's Summary    23 Feb 2022 07:01  -  24 Feb 2022 07:00  --------------------------------------------------------  IN: 1308.1 mL / OUT: 0 mL / NET: 1308.1 mL    24 Feb 2022 07:01  -  24 Feb 2022 15:21  --------------------------------------------------------  IN: 328.4 mL / OUT: 200 mL / NET: 128.4 mL        MEDICATIONS  (STANDING):  carbidopa/levodopa  25/100 2.5 Tablet(s) Oral <User Schedule>  carbidopa/levodopa  25/100 2 Tablet(s) Oral <User Schedule>  chlorhexidine 4% Liquid 1 Application(s) Topical <User Schedule>  dexMEDEtomidine Infusion 0.4 MICROgram(s)/kG/Hr (9.64 mL/Hr) IV Continuous <Continuous>  dextrose 40% Gel 15 Gram(s) Oral once  dextrose 5%. 1000 milliLiter(s) (50 mL/Hr) IV Continuous <Continuous>  dextrose 5%. 1000 milliLiter(s) (100 mL/Hr) IV Continuous <Continuous>  dextrose 50% Injectable 25 Gram(s) IV Push once  dextrose 50% Injectable 12.5 Gram(s) IV Push once  dextrose 50% Injectable 25 Gram(s) IV Push once  diVALproex Sprinkle 250 milliGRAM(s) Oral three times a day  doxazosin 1 milliGRAM(s) Oral at bedtime  DULoxetine 20 milliGRAM(s) Oral daily  folic acid 1 milliGRAM(s) Oral daily  glucagon  Injectable 1 milliGRAM(s) IntraMuscular once  levothyroxine Injectable 30 MICROGram(s) IV Push at bedtime  memantine 5 milliGRAM(s) Oral at bedtime  midodrine 30 milliGRAM(s) Oral every 8 hours  mirtazapine 7.5 milliGRAM(s) Oral daily  mupirocin 2% Nasal 1 Application(s) Both Nostrils two times a day  Nephro-celeste 1 Tablet(s) Oral daily  pantoprazole  Injectable 40 milliGRAM(s) IV Push every 12 hours  QUEtiapine 50 milliGRAM(s) Oral at bedtime  trihexyphenidyl 2 milliGRAM(s) Oral three times a day    MEDICATIONS  (PRN):  HYDROmorphone  Injectable 0.5 milliGRAM(s) IV Push every 1 hour PRN Dyspnea  sodium chloride 0.9% lock flush 10 milliLiter(s) IV Push every 1 hour PRN Pre/post blood products, medications, blood draw, and to maintain line patency        LABS                                            8.9                   Neurophils% (auto):   x      (02-24 @ 00:49):    10.10)-----------(241          Lymphocytes% (auto):  x                                             28.3                   Eosinphils% (auto):   x        Manual%: Neutrophils x    ; Lymphocytes x    ; Eosinophils x    ; Bands%: x    ; Blasts x                                    132    |  98     |  23                  Calcium: 8.6   / iCa: x      (02-24 @ 00:18)    ----------------------------<  189       Magnesium: 1.8                              3.6     |  23     |  2.73             Phosphorous: 3.5      TPro  5.8    /  Alb  1.7    /  TBili  0.3    /  DBili  x      /  AST  31     /  ALT  <5     /  AlkPhos  135    24 Feb 2022 00:18            ASSESSMENT & PLAN:     70yo M w/ PMHx of HTN,  CAD (s/p CABG 2019), CKD (unknown stage), DM2, Parkinson's Disease, depression, dementia  who presented on 10/21/21 p/w LE swelling, was found to be in new onset acute HF exacerbation and NSTEMI s/p hep gtt, s/p MICU course for hemorrhagic shock 2/2 b/l RP bleed s/p IR embolization l4 and l5 lumbar artery on 10/28. Hospital course c/b COVID & new ESRD-HD as of this admission, s/p PermCath and AVF (maturing), new PEG. Further c/b acute blood loss anemia 2/2 UGIB 2/2 duodenal ulcer w/ active bleeding vessel s/p endoscopy on 2/16 with epi and cauterization, remains intubated post procedure and admitted to MICU for monitoring and management, after further family discussion comfort measures only.    Neuro   - On precedex, now following commands, awake and alert  - Avoid haldo/zyprexa for agitation due to hx of PD; may use Ativan 0.25 prn if agitated when weaning sedation  -When pt on medical floors he was confused but able to answer simple questions and identify family members, not able to have normal conversation though    #Parkinson disease   - c/w Sinemet 25/100 2.5 TAB 6 am and 2pm  - c/w Sinemet 25/100 2 TAB 10pm   - c/w Artane 2mg TID     #Depression   - c/w Cymbalta 20mg qd    - c/w Divalproex 250mg tid  - c/w Seroquel 50mg qhs  - c/w Remeron 7.5mg qd       #Dementia   - c/w Namenda 5mg qhs    CV  #Shock state: possible sepsis vs. vasoplegia  -s/p levo off since 2/23 10:30am  - Midodrine 30 q8h   - Treat sepsis empirically    #Heart failure & CAD s/p CABG 2019  - Hold metoprolol 25mg given hypotension and pressor requirements   -Fluid removal by HD, trialed lasix since pt making urine - did not respond  - c/w atorvastatin 80mg qhs    PULM  - Recent COVID infection, PCR still positive, no isolation  - Intubated for endoscopy, plan to extubate 2/24 , medically ready to extubate  do not reintubate    GI  #Acute blood loss anemia 2/2 UGIB 2/2 duodenal ulcer w/ active bleeding vessel   - S/p Endoscopy w/duodenal ulcer s/p tx w/ epi and cautery on 2/16  - If re-bleeds will require IR intervention  - C/w IV BID PPI   - Restart TF as tolerated  - H&H stable 2/20; Trend CBC daily and keep active T&S  -Having BMs, not melena     #C-diff  Positive on 2/13   - C/w vanco 125mg q6h via PEG x 10 days 2/13 - 2/23    Renal   #ESRD on HD via permacath (placed 2/10/22 R IJ); New LUE AVF  - Minimal urine output currently  - HD per nephro  - C/w nephro nina qd   - Replete lytes as appropriate  - c/w Sensipar 60mg qd for hypercalcemia  - On 2/22 did poorly with HD could only remove 500 cc of planned 2L , became tachycardic  2/24 HD     #BPH   - C/w doxazosin   -Javier (2/21) for urinary retention --> DC 2/22  -Straight cath 2/23 AM 200cc  Bladder scan with 150-200 cc  [ ]straight cath qd    ENDO  #DMT2  - ENDO: Suggest DC on Tradjenta 5mg daily, discontinue prior hypoglycemic agents/insulin, endo FU 4 weeks.    #Hypothyroidism.   - continue IV levothyroxine to 30 mg due to subclinical hypothyroidism and hypotension    HEME   # Acute blood loss 2/2 UGIB 2/2 duodenal ulcer   - Hold AC or DVT ppx  - H&H stable, trend CBC qd and active T&S    #Hx of b/l RP bleed during this admission   -s/p Abdominal Angiography and Embolization on 10/29/2021 by Interventional radiology of l4and l5 lumbar artery     ID  # Sepsis   - 2/17 Bcx, NGTD   - 2/18 Sputum -Serratia and proteus, no PMNs  - Was started with empiric coverage with Zosyn 2/17 for leukocytosis and hypothermia. Pt continues to be hypothermic on abx. Then had fever. Zosyn was discontinued 2/22, since it was started only for empiric for sputum  -2/23 Febrile to T38, monitor off abx.  - MRSA swab neg (MSSA positive, started on mupirocin nasal ointment for 10 days)      #C Diff  Positive on 2/13   - C/w vanco 125mg q6h via PEG x 10 days 2/13 - 2/23    PPX  - GI ppx ; PPI gtt --> IV BID on 2/19  - DVT ppx ; compression devices    Ethics  - DNR/DNI  - Discussed with family and they want no reintubation if the patient goes into respiratory failure after extubation they would want a comfort care approach; would like to be present for extubation.   Had family meeting 2/24 with palliative - Family expresses that they would like to extubate today and for comfort measures only subsequently, treat symptoms. Stop blood draws and HD.

## 2022-02-24 NOTE — PROGRESS NOTE ADULT - PROBLEM SELECTOR PLAN 6
For symptom management recommend the following:    For pain: IV dilaudid 0.2mg q4h PRN   For dyspnea: IV dilaudid 0.2mg q4h PRN  For secretions: IV robinul 0.4mg q6h PRN  For anxiety: IV ativan 0.25 mg q6h prn   For nausea/vomiting: IV zofran 4mg q8h PRN   AVOID haldol in setting of Parkinson's disease Pt pending transfer to PCU when bed available after extubation for end of life care and symptom management.    For symptom management recommend the following:    For pain: IV dilaudid 0.2mg q4h PRN   For dyspnea: IV dilaudid 0.2mg q4h PRN  For secretions: IV robinul 0.4mg q6h PRN  For anxiety: IV ativan 0.25 mg q6h prn   For nausea/vomiting: IV zofran 4mg q8h PRN   AVOID haldol in setting of Parkinson's disease    For acute issues or uncontrolled symptoms please page palliative team.    Sybil Muniz MD  Geriatrics and Palliative Medicine Attending  [id number] pager: [phone]     The Geriatrics and Palliative Medicine consult service has 24/7 coverage for medical recommendations, including symptom management needs.

## 2022-02-24 NOTE — PROGRESS NOTE ADULT - PROBLEM SELECTOR PLAN 2
currently receiving HD  will d/c based on GOC, per discussion goal is for symptom directed care with focus being patient's comfort.

## 2022-02-24 NOTE — PROGRESS NOTE ADULT - ATTENDING COMMENTS
Pt is a 70 yo M with h/o CAD (s/p CABG 2019), HTN, DM2, CKD, Parkinson's Disease, depression, dementia  who presented on 10/21/21 p/w LE swelling, was found to be in new onset acute HF exacerbation and NSTEMI s/p hep gtt, s/p MICU course for hemorrhagic shock 2 to b/l RP bleed s/p IR embolization l4 and l5 lumbar artery on 10/28. Hospital course complicated by COVID & new ESRD-HD as of this admission, s/p PermCath and AVF (maturing), new PEG. Further complicated by acute blood loss anemia 2 to UGIB 2 to duodenal ulcer w/ active bleeding vessel s/p endoscopy on 2/16 with epi and cauterization, remains intubated post procedure and admitted to MICU    Resp/ Social: Today pt is more awake and weaning well; as per discussion with family agree with extubation/ Possible transfer to PCU/ F/u as per Palliative care  CVS: Off Levophed  FEN: Depending of resp and mental status ? po diet  GI: GI f/u prn  Renal: HD as per Renal  Neuro: Minimize sedation  Psych: Cont Psych meds . Pt is a 72 yo M with h/o CAD (s/p CABG 2019), HTN, DM2, CKD, Parkinson's Disease, depression, dementia  who presented on 10/21/21 p/w LE swelling, was found to be in new onset acute HF exacerbation and NSTEMI s/p hep gtt, s/p MICU course for hemorrhagic shock 2 to b/l RP bleed s/p IR embolization l4 and l5 lumbar artery on 10/28. Hospital course complicated by COVID & new ESRD-HD as of this admission, s/p PermCath and AVF (maturing), new PEG. Further complicated by acute blood loss anemia 2 to UGIB 2 to duodenal ulcer w/ active bleeding vessel s/p endoscopy on 2/16 with epi and cauterization, remains intubated post procedure and admitted to MICU    Resp/ Social: Today pt is more awake and weaning well; as per discussion with family agree with extubation/ Possible transfer to PCU/ F/u as per Palliative care  CVS: Off Levophed  FEN: Depending of resp and mental status ? po diet Pt is a 70 yo M with h/o CAD (s/p CABG 2019), HTN, DM2, CKD, Parkinson's Disease, depression, dementia  who presented on 10/21/21 p/w LE swelling, was found to be in new onset acute HF exacerbation and NSTEMI s/p hep gtt, s/p MICU course for hemorrhagic shock 2 to b/l RP bleed s/p IR embolization l4 and l5 lumbar artery on 10/28. Hospital course complicated by COVID & new ESRD-HD as of this admission, s/p PermCath and AVF (maturing), new PEG. Further complicated by acute blood loss anemia 2 to UGIB 2 to duodenal ulcer w/ active bleeding vessel s/p endoscopy on 2/16 with epi and cauterization, remains intubated post procedure and admitted to MICU    Resp/ Social: Today pt is more awake and weaning well; as per discussion with family agree with extubation/ Pt is DNR/ Possible transfer to PCU/ F/u as per Palliative care  CVS: Off Levophed  FEN: Depending of resp and mental status ? po diet

## 2022-02-24 NOTE — PROGRESS NOTE ADULT - ASSESSMENT
70yo M w/ PMHx of HTN,  CAD (s/p CABG 2019), CKD (unknown stage), DM2, Parkinson's Disease, depression, dementia  who presented on 10/21/21 p/w LE swelling, was found to be in new onset acute HF exacerbation and NSTEMI s/p hep gtt, s/p MICU course for hemorrhagic shock 2/2 b/l RP bleed s/p IR embolization l4 and l5 lumbar artery on 10/28. Hospital course c/b COVID & new ESRD-HD as of this admission, s/p PermCath and AVF (maturing), new PEG. Further c/b acute blood loss anemia 2/2 UGIB 2/2 duodenal ulcer w/ active bleeding vessel s/p endoscopy on 2/16 with epi and cauterization, remains intubated post procedure and admitted to MICU for monitoring and management.    Neuro   - On precedex, now following commands, awake and alert  - Avoid haldo/zyprexa for agitation due to hx of PD; may use Ativan 0.25 prn if agitated when weaning sedation  -When pt on medical floors he was confused but able to answer simple questions and identify family members, not able to have normal conversation though    #Parkinson disease   - c/w Sinemet 25/100 2.5 TAB 6 am and 2pm  - c/w Sinemet 25/100 2 TAB 10pm   - c/w Artane 2mg TID     #Depression   - c/w Cymbalta 20mg qd    - c/w Divalproex 250mg tid  - c/w Seroquel 50mg qhs  - c/w Remeron 7.5mg qd       #Dementia   - c/w Namenda 5mg qhs    CV  #Shock state: possible sepsis vs. vasoplegia  -s/p levo off since 2/23 10:30am  - Midodrine 20q8h with hold for bradycardia --> increase to 30 q8h   - Treat sepsis empirically    #Heart failure & CAD s/p CABG 2019  - Hold metoprolol 25mg given hypotension and pressor requirements   -Fluid removal by HD, trialed lasix since pt making urine - did not respond  - c/w atorvastatin 80mg qhs    PULM  - Recent COVID infection, PCR still positive, no isolation  - Intubated prior to endoscopy, plan to wean and extubate after another session of dialysis   - PS trial when weaning sedation, and trial of extubation if able to tolerate    GI  #Acute blood loss anemia 2/2 UGIB 2/2 duodenal ulcer w/ active bleeding vessel   - S/p Endoscopy w/duodenal ulcer s/p tx w/ epi and cautery on 2/16  - If re-bleeds will require IR intervention  - C/w IV BID PPI   - Restart TF as tolerated  - H&H stable 2/20; Trend CBC daily and keep active T&S  - last melena was 2/20 am -- no BM since then     #C-diff  Positive on 2/13   - C/w vanco 125mg q6h via PEG x 10 days 2/13 - 2/23    Renal   #ESRD on HD via permacath (placed 2/10/22 R IJ); New LUE AVF  - Minimal urine output currently  - HD per nephro  - C/w nephro nina qd   - Replete lytes as appropriate  - c/w Sensipar 60mg qd for hypercalcemia  - On 2/22 did poorly with HD could only remove 500 cc of planned 2L , became tachycardic    #BPH   - C/w doxazosin   -Javier (2/21) for urinary retention --> DC 2/22  -Straight cath 2/23 AM 200cc  Bladder scan with 150-200 cc  [ ]straight cath qd    ENDO  #DMT2  - ENDO: Suggest DC on Tradjenta 5mg daily, discontinue prior hypoglycemic agents/insulin, endo FU 4 weeks.    #Hypoglycemia -- resolved  -s/p D10  -resolved now that on TFs    #Hypothyroidism.   - continue IV levothyroxine to 30 mg due to subclinical hypothyroidism and hypotension    HEME   # Acute blood loss 2/2 UGIB 2/2 duodenal ulcer   - Hold AC or DVT ppx  - H&H stable, trend CBC qd and active T&S    #Hx of b/l RP bleed during this admission   -s/p Abdominal Angiography and Embolization on 10/29/2021 by Interventional radiology of l4and l5 lumbar artery     ID  # Sepsis   - 2/17 Bcx, NGTD   - 2/18 Sputum -Serratia and proteus, no PMNs  - Was started with empiric coverage with Zosyn 2/17 for leukocytosis and hypothermia. Pt continues to be hypothermic on abx. Then had fever. Zosyn was discontinued 2/22, since it was started only for empiric for sputum  -2/23 Febrile to T38, monitor off abx.  - MRSA swab neg (MSSA positive, started on mupirocin nasal ointment for 10 days)      #C Diff  Positive on 2/13   - C/w vanco 125mg q6h via PEG x 10 days 2/13 - 2/23    PPX  - GI ppx ; PPI gtt --> IV BID on 2/19  - DVT ppx ; compression devices    Ethics  - DNR/DNI  - Discussed with family and they want no reintubation if the patient goes into respiratory failure after extubation they would want a comfort care approach; would like to be present for extubation.   Plan for family meeting 2/24 @ 10:30 AM with palliative 72yo M w/ PMHx of HTN,  CAD (s/p CABG 2019), CKD (unknown stage), DM2, Parkinson's Disease, depression, dementia  who presented on 10/21/21 p/w LE swelling, was found to be in new onset acute HF exacerbation and NSTEMI s/p hep gtt, s/p MICU course for hemorrhagic shock 2/2 b/l RP bleed s/p IR embolization l4 and l5 lumbar artery on 10/28. Hospital course c/b COVID & new ESRD-HD as of this admission, s/p PermCath and AVF (maturing), new PEG. Further c/b acute blood loss anemia 2/2 UGIB 2/2 duodenal ulcer w/ active bleeding vessel s/p endoscopy on 2/16 with epi and cauterization, remains intubated post procedure and admitted to MICU for monitoring and management, after further family discussion comfort measures only.    Neuro   - On precedex, now following commands, awake and alert  - Avoid haldo/zyprexa for agitation due to hx of PD; may use Ativan 0.25 prn if agitated when weaning sedation  -When pt on medical floors he was confused but able to answer simple questions and identify family members, not able to have normal conversation though    #Parkinson disease   - c/w Sinemet 25/100 2.5 TAB 6 am and 2pm  - c/w Sinemet 25/100 2 TAB 10pm   - c/w Artane 2mg TID     #Depression   - c/w Cymbalta 20mg qd    - c/w Divalproex 250mg tid  - c/w Seroquel 50mg qhs  - c/w Remeron 7.5mg qd       #Dementia   - c/w Namenda 5mg qhs    CV  #Shock state: possible sepsis vs. vasoplegia  -s/p levo off since 2/23 10:30am  - Midodrine 30 q8h   - Treat sepsis empirically    #Heart failure & CAD s/p CABG 2019  - Hold metoprolol 25mg given hypotension and pressor requirements   -Fluid removal by HD, trialed lasix since pt making urine - did not respond  - c/w atorvastatin 80mg qhs    PULM  - Recent COVID infection, PCR still positive, no isolation  - Intubated for endoscopy, plan to extubate 2/24 , medically ready to extubate  do not reintubate    GI  #Acute blood loss anemia 2/2 UGIB 2/2 duodenal ulcer w/ active bleeding vessel   - S/p Endoscopy w/duodenal ulcer s/p tx w/ epi and cautery on 2/16  - If re-bleeds will require IR intervention  - C/w IV BID PPI   - Restart TF as tolerated  - H&H stable 2/20; Trend CBC daily and keep active T&S  -Having BMs, not melena     #C-diff  Positive on 2/13   - C/w vanco 125mg q6h via PEG x 10 days 2/13 - 2/23    Renal   #ESRD on HD via permacath (placed 2/10/22 R IJ); New LUE AVF  - Minimal urine output currently  - HD per nephro  - C/w nephro nina qd   - Replete lytes as appropriate  - c/w Sensipar 60mg qd for hypercalcemia  - On 2/22 did poorly with HD could only remove 500 cc of planned 2L , became tachycardic  2/24 HD     #BPH   - C/w doxazosin   -Javier (2/21) for urinary retention --> DC 2/22  -Straight cath 2/23 AM 200cc  Bladder scan with 150-200 cc  [ ]straight cath qd    ENDO  #DMT2  - ENDO: Suggest DC on Tradjenta 5mg daily, discontinue prior hypoglycemic agents/insulin, endo FU 4 weeks.    #Hypothyroidism.   - continue IV levothyroxine to 30 mg due to subclinical hypothyroidism and hypotension    HEME   # Acute blood loss 2/2 UGIB 2/2 duodenal ulcer   - Hold AC or DVT ppx  - H&H stable, trend CBC qd and active T&S    #Hx of b/l RP bleed during this admission   -s/p Abdominal Angiography and Embolization on 10/29/2021 by Interventional radiology of l4and l5 lumbar artery     ID  # Sepsis   - 2/17 Bcx, NGTD   - 2/18 Sputum -Serratia and proteus, no PMNs  - Was started with empiric coverage with Zosyn 2/17 for leukocytosis and hypothermia. Pt continues to be hypothermic on abx. Then had fever. Zosyn was discontinued 2/22, since it was started only for empiric for sputum  -2/23 Febrile to T38, monitor off abx.  - MRSA swab neg (MSSA positive, started on mupirocin nasal ointment for 10 days)      #C Diff  Positive on 2/13   - C/w vanco 125mg q6h via PEG x 10 days 2/13 - 2/23    PPX  - GI ppx ; PPI gtt --> IV BID on 2/19  - DVT ppx ; compression devices    Ethics  - DNR/DNI  - Discussed with family and they want no reintubation if the patient goes into respiratory failure after extubation they would want a comfort care approach; would like to be present for extubation.   Had family meeting 2/24 with palliative - Family expresses that they would like to extubate today and for comfort measures only subsequently, treat symptoms. Stop blood draws and HD.

## 2022-02-24 NOTE — PROGRESS NOTE ADULT - CONVERSATION/DISCUSSION
Diagnosis/Prognosis/MOLST Discussed/Treatment Options
Diagnosis/MOLST Discussed/Treatment Options
Diagnosis/Prognosis/MOLST Discussed

## 2022-02-24 NOTE — PROGRESS NOTE ADULT - SUBJECTIVE AND OBJECTIVE BOX
Chief Complaint:  Patient is a 71y old  Male who presents with a chief complaint of leg swelling (2022 11:17)      Date of service 22 @ 12:52      Interval Events:   Patient seen and examined.   Remains intubated in ICU.  Now having black loose stools.  Comfort care after family and palliative care discussion.     Hospital Medications:  carbidopa/levodopa  25/100 2.5 Tablet(s) Oral <User Schedule>  carbidopa/levodopa  25/100 2 Tablet(s) Oral <User Schedule>  chlorhexidine 4% Liquid 1 Application(s) Topical <User Schedule>  dexMEDEtomidine Infusion 0.4 MICROgram(s)/kG/Hr IV Continuous <Continuous>  dextrose 40% Gel 15 Gram(s) Oral once  dextrose 5%. 1000 milliLiter(s) IV Continuous <Continuous>  dextrose 5%. 1000 milliLiter(s) IV Continuous <Continuous>  dextrose 50% Injectable 25 Gram(s) IV Push once  dextrose 50% Injectable 12.5 Gram(s) IV Push once  dextrose 50% Injectable 25 Gram(s) IV Push once  diVALproex Sprinkle 250 milliGRAM(s) Oral three times a day  doxazosin 1 milliGRAM(s) Oral at bedtime  DULoxetine 20 milliGRAM(s) Oral daily  folic acid 1 milliGRAM(s) Oral daily  glucagon  Injectable 1 milliGRAM(s) IntraMuscular once  HYDROmorphone  Injectable 0.5 milliGRAM(s) IV Push every 1 hour PRN  levothyroxine Injectable 30 MICROGram(s) IV Push at bedtime  memantine 5 milliGRAM(s) Oral at bedtime  midodrine 30 milliGRAM(s) Oral every 8 hours  mirtazapine 7.5 milliGRAM(s) Oral daily  mupirocin 2% Nasal 1 Application(s) Both Nostrils two times a day  Nephro-celeste 1 Tablet(s) Oral daily  pantoprazole  Injectable 40 milliGRAM(s) IV Push every 12 hours  QUEtiapine 50 milliGRAM(s) Oral at bedtime  sodium chloride 0.9% lock flush 10 milliLiter(s) IV Push every 1 hour PRN  trihexyphenidyl 2 milliGRAM(s) Oral three times a day        Review of Systems:  unable to obtain    PHYSICAL EXAM:   Vital Signs:  Vital Signs Last 24 Hrs  T(C): 36 (2022 07:00), Max: 38.1 (2022 16:00)  T(F): 96.8 (2022 07:00), Max: 100.6 (2022 16:00)  HR: 74 (2022 12:40) (55 - 110)  BP: --  BP(mean): --  RR: 24 (2022 12:40) (11 - 33)  SpO2: 100% (2022 12:40) (98% - 100%)  Daily     Daily       PHYSICAL EXAM:     GENERAL:  Appears stated age, well-groomed, well-nourished, no distress  HEENT:  NC/AT,  conjunctivae anicteric, clear and pink, +ETT   NECK: supple, trachea midline  CHEST:  Full & symmetric excursion, no increased effort, breath sounds clear  HEART:  Regular rhythm, no JVD  ABDOMEN:  Soft, non-tender, non-distended, normoactive bowel sounds,  no masses , no hepatosplenomegaly, gastrotomy   EXTREMITIES:  no cyanosis,clubbing or edema  SKIN:  No rash, erythema, or, ecchymoses, no jaundice  NEURO:  non-focal, no asterixis  RECTAL: Deferred      LABS Personally reviewed by me:                        8.9    10.10 )-----------( 241      ( 2022 00:49 )             28.3     Mean Cell Volume: 99.3 fl (-22 @ 00:49)        132<L>  |  98  |  23  ----------------------------<  189<H>  3.6   |  23  |  2.73<H>    Ca    8.6      2022 00:18  Phos  3.5       Mg     1.8         TPro  5.8<L>  /  Alb  1.7<L>  /  TBili  0.3  /  DBili  x   /  AST  31  /  ALT  <5<L>  /  AlkPhos  135<H>      LIVER FUNCTIONS - ( 2022 00:18 )  Alb: 1.7 g/dL / Pro: 5.8 g/dL / ALK PHOS: 135 U/L / ALT: <5 U/L / AST: 31 U/L / GGT: x             Urinalysis Basic - ( 2022 06:19 )    Color: Light Yellow / Appearance: Clear / S.008 / pH: x  Gluc: x / Ketone: Negative  / Bili: Negative / Urobili: Negative   Blood: x / Protein: 30 mg/dL / Nitrite: Negative   Leuk Esterase: Large / RBC: 3 /hpf / WBC 19 /HPF   Sq Epi: x / Non Sq Epi: 1 /hpf / Bacteria: Negative                              8.9    10.10 )-----------( 241      ( 2022 00:49 )             28.3                         9.3    9.48  )-----------( 226      ( 2022 00:10 )             28.8                         8.5    8.76  )-----------( 161      ( 2022 00:13 )             26.5       Imaging personally reviewed by me:

## 2022-02-24 NOTE — CHART NOTE - NSCHARTNOTESELECT_GEN_ALL_CORE
Event Note
HD cath/Event Note
MICU Accept Note/Transfer Note
Nutrition Services
POCUS/Event Note
PTT > 200/Event Note
post-op
Abdominal pain/Event Note
Accept Note/Transfer Note
Anemia/Event Note
Cardiology brief note./Event Note
Cardiology brief note/Event Note
Event Note
Follow-up note/Nutrition Services
G Tube removal/Event Note
Hematology/Off Service Note
Hgb drop/Event Note
Hypoglycemia/Event Note
IR
IR Resident/Event Note
MICU --> PCU/Transfer Note
MICU/Transfer Note
Nutrition Services
POCUS- MICU
Palliative Care/Event Note
Palliative Medicine/Event Note
TFTS/Event Note
Transfer Note/Event Note
VIR/Off Service Note
anemia/Event Note
cdif/Event Note
preop vasc/Event Note
pt. removed Robert CALDERÓN 11/4 @ 2230/Event Note
supratherapeutic PTT/Event Note
wound team/Event Note

## 2022-02-24 NOTE — PROGRESS NOTE ADULT - SUBJECTIVE AND OBJECTIVE BOX
Events noted, HD aborted earlier today       VITAL:  T(C): , Max: 38.1 (22 @ 16:00)  T(F): , Max: 100.6 (22 @ 16:00)  HR: 56 (22 @ 13:00)  BP: --  BP(mean): --  RR: 21 (22 @ 13:00)  SpO2: 100% (22 @ 13:00)  Wt(kg): --      PHYSICAL EXAM:  General: ill appearing   HEENT: MMM  Lungs:CTA-b/l  Heart: RRR S1S2  Abdomen: Soft, NTND  Ext: no pedal edema b/l  : no johnson  Vascular Access- R permcath  cath And LAV fistula         LABS:                        8.9    10.10 )-----------( 241      ( 2022 00:49 )             28.3     Na(132)/K(3.6)/Cl(98)/HCO3(23)/BUN(23)/Cr(2.73)Glu(189)/Ca(8.6)/Mg(1.8)/PO4(3.5)     @ 00:18  Na(134)/K(3.5)/Cl(98)/HCO3(24)/BUN(14)/Cr(1.99)Glu(189)/Ca(8.3)/Mg(1.7)/PO4(2.7)     @ 00:10  Na(130)/K(3.6)/Cl(97)/HCO3(22)/BUN(30)/Cr(2.93)Glu(75)/Ca(8.4)/Mg(1.9)/PO4(4.2)     @ 00:13    Urinalysis Basic - ( 2022 06:19 )    Color: Light Yellow / Appearance: Clear / S.008 / pH: x  Gluc: x / Ketone: Negative  / Bili: Negative / Urobili: Negative   Blood: x / Protein: 30 mg/dL / Nitrite: Negative   Leuk Esterase: Large / RBC: 3 /hpf / WBC 19 /HPF   Sq Epi: x / Non Sq Epi: 1 /hpf / Bacteria: Negative      M w/ HTN, DM2, CAD-CABG, Parkinson's disease, and advanced CKD, 10/21/21 p/w LE swelling, c/b COVID; now newly ESRD-HD as of this admission    (1)Renal - ESRD-HD   (2)Anemia- due to GI bleed   (3)Failure to thrive and confusion   (4) CV- on norepi and midodrine    RECOMMEND  (1)No further HD as per family wishes   (2wean pressors as able  (3) Vanco for C diff     Will sign off, please reconsult as needed         Maria Ines Watts MD  [facility]  Office: [phone]

## 2022-02-25 NOTE — PROGRESS NOTE ADULT - PROBLEM SELECTOR PLAN 2
currently receiving HD  will d/c based on GOC, per discussion goal is for symptom directed care with focus being patient's comfort. Patient with excessive secretions.   -C/w nursing care for suctioning.   -Glycopyrrolate 0.4 IVP q6hrs for excessive secretions  -Scopolamine patch if needed for refractory secretions.

## 2022-02-25 NOTE — PROGRESS NOTE ADULT - PROBLEM SELECTOR PLAN 5
see above GOC note  plan is for compassionate withdrawal of care and transition of care to symptom directed see above GOC note from 2/24.   plan is for compassionate withdrawal of care and transition of care to symptom directed Last HD on 2/24.   D/kassy based on GOC, per discussion goal is for symptom directed care with focus being patient's comfort. Patient previously in the MICU for shock state, weaned off pressors since 2/23 on midodrine TID .   -Per GOC with family will focus on symptomatic management and  d/c midodrine and monitor off midodrine. Pain ADS score of 0, patient not requiring any PRNs for pain at this time.   -C/w dilaudid 0.5 mg q6 for pain and 1mg q1PRN for pain.   -Will adjust pain meds as needed.

## 2022-02-25 NOTE — PROGRESS NOTE ADULT - PROBLEM SELECTOR PLAN 10
see GOC note from 2/24.   plan is for compassionate withdrawal of care and transition of care to symptom directed s/p multiple bleeds  recent endoscopy for bleeding ulcer with cauterization  No longer checking CBCs to monitor anemia.

## 2022-02-25 NOTE — PROGRESS NOTE ADULT - PROBLEM SELECTOR PLAN 9
Pt transferred to the PCU on 2/25.     For symptom management recommend the following:    For pain: IV dilaudid 0.2mg q4h PRN   For dyspnea: IV dilaudid 0.2mg q4h PRN  For secretions: IV robinul 0.4mg q6h PRN  For anxiety: IV ativan 0.25 mg q6h prn   For nausea/vomiting: IV zofran 4mg q8h PRN   AVOID haldol in setting of Parkinson's disease Per conversation with son today, patient actively in the dying process will have minimal benefit from PD medications at this point. Will d/c patient's trihexyphenydyl, carbidopa/levodopa and focus on comfort and symptomatic management.  -Avoid Haldol i/s/o Parkinson disease. Last HD on 2/24.   D/kassy based on GOC, per discussion goal is for symptom directed care with focus being patient's comfort.

## 2022-02-25 NOTE — PROGRESS NOTE ADULT - PROBLEM SELECTOR PLAN 11
Pt transferred to the PCU on 2/25. Per conversation with son today, patient actively in the dying process will have minimal benefit from PD medications at this point. Will d/c patient's trihexyphenydyl, carbidopa/levodopa and focus on comfort and symptomatic management.  -Avoid Haldol i/s/o Parkinson disease.

## 2022-02-25 NOTE — PROGRESS NOTE ADULT - PROBLEM SELECTOR PLAN 1
PPSV 10%   Pt requires assistance with all ADLs Multifactorial, 2/2 volume overload vs. excessive secretions possibly VAP given serratia in the sputum? vs. failure to protect airway from secretions.   -S/p palliative extubation on 2/24 per GOC with family.   -Family aware that patient is EOL  -C/w supplemental O2 for comfort and dyspnea  -Dilaudid initially at 0.5 mg IVP q4 for dyspnea increased dose and frequency to 1mg IVP q1 given increased dyspnea per nursing and increased need for suctioning and respiratory care.

## 2022-02-25 NOTE — PROGRESS NOTE ADULT - PROBLEM SELECTOR PLAN 7
Per conversation with son today, patient actively in the dying process will have minimal benefit from PD medications at this point. Will d/c patient's trihexyphenydyl, carbidopa/levodopa and focus on comfort and symptomatic management. Last HD on 2/24.   D/kassy based on GOC, per discussion goal is for symptom directed care with focus being patient's comfort. Patient not tolerating G tube feeds here in the PCU, unable to feed through the G tube with spillage of gastric contents.   -will hold patient's G tube feeds, family amenable to plan for now.   -Will limit meds through G tube as well.

## 2022-02-25 NOTE — PROGRESS NOTE ADULT - PROBLEM SELECTOR PLAN 3
Patient up to bathroom with assist x 2. Voided 600ml. Patient transferred to mother/baby room 1109 per wheelchair in stable condition with baby and personal belongings. Accompanied by significant other and staff. Report given to mother/baby RN. s/p multiple bleeds  recent endoscopy for bleeding ulcer with cauterization Waxing and waning mental status. Multifactorial from cognitive decline, prolonged hospital stay, baseline dementia.   -Monitor for reversible signs of delirium including urinary obstruction, constipation, pain, agitation, anxiety.  -Patient not able to verbalize pain however PAINADS score 0, not requiring any PRNs for pain, c/w diluadid For hx of depression patient on:  - c/w Cymbalta 20mg qd, c/w Divalproex 250mg tid, c/w Seroquel 50mg qhs, c/w Remeron 7.5mg qd.   - After discussion with patient's family and given EOL care provided here in the PCU patient amenable to dcing these medications.   -Given concern for SSRI withdrawal and hx of possible anxiety/depression will c/w Ativan ATC 0.5 mg IVP                     q6 and Ativan 0.5 mg q1 PRN.

## 2022-02-25 NOTE — PROGRESS NOTE ADULT - SUBJECTIVE AND OBJECTIVE BOX
GAP TEAM PALLIATIVE CARE UNIT PROGRESS NOTE:      [  ] Patient on hospice program.    INDICATION FOR PALLIATIVE CARE UNIT SERVICES:    INTERVAL HPI/OVERNIGHT EVENTS:    DNR on chart:   Allergies    adhesives (Rash)  latex (Urticaria)  No Known Drug Allergies    Intolerances    MEDICATIONS  (STANDING):  carbidopa/levodopa  25/100 2.5 Tablet(s) Oral <User Schedule>  carbidopa/levodopa  25/100 2 Tablet(s) Oral <User Schedule>  chlorhexidine 4% Liquid 1 Application(s) Topical <User Schedule>  diVALproex Sprinkle 250 milliGRAM(s) Oral three times a day  doxazosin 1 milliGRAM(s) Oral at bedtime  DULoxetine 20 milliGRAM(s) Oral daily  levothyroxine Injectable 30 MICROGram(s) IV Push at bedtime  midodrine 30 milliGRAM(s) Oral every 8 hours  mirtazapine 7.5 milliGRAM(s) Oral daily  pantoprazole  Injectable 40 milliGRAM(s) IV Push every 12 hours  QUEtiapine 50 milliGRAM(s) Oral at bedtime  trihexyphenidyl 2 milliGRAM(s) Oral three times a day    MEDICATIONS  (PRN):  HYDROmorphone  Injectable 0.5 milliGRAM(s) IV Push every 1 hour PRN Dyspnea  sodium chloride 0.9% lock flush 10 milliLiter(s) IV Push every 1 hour PRN Pre/post blood products, medications, blood draw, and to maintain line patency    ITEMS UNCHECKED ARE NOT PRESENT    PRESENT SYMPTOMS: [ ]Unable to obtain due to poor mentation   Source if other than patient:  [ ]Family   [ ]Team     Pain: [ ] yes [ ] no  QOL impact -   Location -                    Aggravating factors -  Quality -  Radiation -  Timing-  Severity (0-10 scale):  Minimal acceptable level (0-10 scale):     Dyspnea:                           [ ]Mild [ ]Moderate [ ]Severe  Anxiety:                             [ ]Mild [ ]Moderate [ ]Severe  Fatigue:                             [ ]Mild [ ]Moderate [ ]Severe  Nausea:                             [ ]Mild [ ]Moderate [ ]Severe  Loss of appetite:              [ ]Mild [ ]Moderate [ ]Severe  Constipation:                    [ ]Mild [ ]Moderate [ ]Severe    PAINAD Score:    [email or web address]/[email or web address] (Ctrl +  left click to view)  		  Other Symptoms:  [ ]All other review of systems negative     Palliative Performance Status Version 2:         %         http://npcrc.org/files/news/palliative_performance_scale_ppsv2.pdf  PHYSICAL EXAM:  Vital Signs Last 24 Hrs  T(C): 36.8 (25 Feb 2022 08:00), Max: 36.8 (25 Feb 2022 08:00)  T(F): 98.3 (25 Feb 2022 08:00), Max: 98.3 (25 Feb 2022 08:00)  HR: 32 (25 Feb 2022 08:00) (32 - 116)  BP: 74/40 (25 Feb 2022 08:00) (74/40 - 93/51)  BP(mean): 64 (24 Feb 2022 15:35) (64 - 64)  RR: 24 (24 Feb 2022 17:40) (13 - 24)  SpO2: 82% (24 Feb 2022 17:40) (82% - 100%) I&O's Summary    24 Feb 2022 07:01  -  25 Feb 2022 07:00  --------------------------------------------------------  IN: 288.4 mL / OUT: 200 mL / NET: 88.4 mL    GENERAL:  [ ]Alert  [ ]Oriented x   [ ]Lethargic  [ ]Cachexia  [ ]Unarousable  [ ]Verbal  [ ]Non-Verbal  Behavioral:   [ ] Anxiety  [ ] Delirium [ ] Agitation [ ] Other  HEENT:  [ ]Normal   [ ]Dry mouth   [ ]ET Tube/Trach  [ ]Oral lesions  PULMONARY:   [ ]Clear [ ]Tachypnea  [ ]Audible excessive secretions   [ ]Rhonchi        [ ]Right [ ]Left [ ]Bilateral  [ ]Crackles        [ ]Right [ ]Left [ ]Bilateral  [ ]Wheezing     [ ]Right [ ]Left [ ]Bilateral  [ ]Diminished BS [ ]Right [ ]Left [ ]Bilateral    CARDIOVASCULAR:    [ ]Regular [ ]Irregular [ ]Tachy  [ ]Trevon [ ]Murmur [ ]Other  GASTROINTESTINAL:  [ ]Soft  [ ]Distended   [ ]+BS  [ ]Non tender [ ]Tender  [ ]PEG [ ]OGT/ NGT   Last BM:     GENITOURINARY:  [ ]Normal [ ] Incontinent   [ ]Oliguria/Anuria   [ ]Javier  MUSCULOSKELETAL:   [ ]Normal   [ ]Weakness  [ ]Bed/Wheelchair bound [ ]Edema  NEUROLOGIC:   [ ]No focal deficits  [ ] Cognitive impairment  [ ] Dysphagia [ ]Dysarthria [ ] Paresis [ ]Other   SKIN:   [ ]Normal  [ ]Rash     [ ]Pressure ulcer(s)  [ ]y [ ]n  Present on admission      CRITICAL CARE:  [ ] Shock Present  [ ]Septic [ ]Cardiogenic [ ]Neurologic [ ]Hypovolemic  [ ]  Vasopressors [ ]  Inotropes   [ ] Respiratory failure present [ ] Mechanical Ventilation [ ] Non-invasive ventilatory support [ ] High-Flow  [ ] Acute  [ ] Chronic [ ] Hypoxic  [ ] Hypercarbic [ ] Other  [ ] Other organ failure     LABS:                        8.9    10.10 )-----------( 241      ( 24 Feb 2022 00:49 )             28.3   02-24    132<L>  |  98  |  23  ----------------------------<  189<H>  3.6   |  23  |  2.73<H>    Ca    8.6      24 Feb 2022 00:18  Phos  3.5     02-24  Mg     1.8     02-24    TPro  5.8<L>  /  Alb  1.7<L>  /  TBili  0.3  /  DBili  x   /  AST  31  /  ALT  <5<L>  /  AlkPhos  135<H>  02-24        RADIOLOGY & ADDITIONAL STUDIES:    PROTEIN CALORIE MALNUTRITION: [ ] mild [ ] moderate [ ] severe  [ ] underweight [ ] morbid obesity    https://www.andeal.org/vault/4470/web/files/ONC/Table_Clinical%20Characteristics%20to%20Document%20Malnutrition-White%20JV%20et%20al%202012.pdf    Height (cm): 180.3 (02-16-22 @ 17:35)  Weight (kg): 96.4 (02-16-22 @ 17:35)  BMI (kg/m2): 29.7 (02-16-22 @ 17:35)    [ ] PPSV2 < or = 30% [ ] significant weight loss [ ] poor nutritional intake [ ] anasarca   Artificial Nutrition [ ]     REFERRALS:   [ ]Chaplaincy  [ ] Hospice  [ ]Child Life  [ ]Social Work  [ ]Case management [ ]Holistic Therapy [ ] Physical Therapy [ ] Dietary   Goals of Care Document: Goals of Care Conversation - Advanced Care Planning [MARY Juarez] (02-10-22 @ 19:03)  Goals of Care Conversation - Advanced Care Planning [MARY Juarez] (12-04-21 @ 06:57)     GAP TEAM PALLIATIVE CARE UNIT PROGRESS NOTE:      [  ] Patient on hospice program.    INDICATION FOR PALLIATIVE CARE UNIT SERVICES: S/p palliative extubation, transferred to PCU for further symptomatic focused management.     INTERVAL HPI/OVERNIGHT EVENTS:    Seen and examined at the bedside this am. Per nursing patient with excessive secretions and need for frequent nasopharyngeal suctioning as patient biting down on oral suction. Over the past 24 hrs patient required PRN dilaudid for dyspnea x3 (12pm, 6pm 10pm). Patient non-verbal during ROS questioning, awake during exam this morning.     DNR on chart:   Allergies    adhesives (Rash)  latex (Urticaria)  No Known Drug Allergies    Intolerances    MEDICATIONS  (STANDING):  carbidopa/levodopa  25/100 2.5 Tablet(s) Oral <User Schedule>  carbidopa/levodopa  25/100 2 Tablet(s) Oral <User Schedule>  chlorhexidine 4% Liquid 1 Application(s) Topical <User Schedule>  diVALproex Sprinkle 250 milliGRAM(s) Oral three times a day  doxazosin 1 milliGRAM(s) Oral at bedtime  DULoxetine 20 milliGRAM(s) Oral daily  levothyroxine Injectable 30 MICROGram(s) IV Push at bedtime  midodrine 30 milliGRAM(s) Oral every 8 hours  mirtazapine 7.5 milliGRAM(s) Oral daily  pantoprazole  Injectable 40 milliGRAM(s) IV Push every 12 hours  QUEtiapine 50 milliGRAM(s) Oral at bedtime  trihexyphenidyl 2 milliGRAM(s) Oral three times a day    MEDICATIONS  (PRN):  HYDROmorphone  Injectable 0.5 milliGRAM(s) IV Push every 1 hour PRN Dyspnea  sodium chloride 0.9% lock flush 10 milliLiter(s) IV Push every 1 hour PRN Pre/post blood products, medications, blood draw, and to maintain line patency    ITEMS UNCHECKED ARE NOT PRESENT    PRESENT SYMPTOMS: [X]Unable to obtain as patient non-verbal.   Source if other than patient:  [ ]Family   [X]Team     Pain: [ ] yes [ ] no  QOL impact -   Location -                    Aggravating factors -  Quality -  Radiation -  Timing-  Severity (0-10 scale):  Minimal acceptable level (0-10 scale):     Dyspnea:                           [ ]Mild [X]Moderate [ ]Severe  Anxiety:                             [ ]Mild [ ]Moderate [ ]Severe  Fatigue:                             [ ]Mild [ ]Moderate [ ]Severe  Nausea:                             [ ]Mild [ ]Moderate [ ]Severe  Loss of appetite:              [ ]Mild [ ]Moderate [ ]Severe  Constipation:                    [ ]Mild [ ]Moderate [ ]Severe    PAINAD Score: 0    [email or web address] (Ctrl +  left click to view)  		  Other Symptoms:  [ ]All other review of systems negative     Palliative Performance Status Version 2:         10%         http://[email or web address]/files/news/palliative_performance_scale_[email or web address].pdf  PHYSICAL EXAM:  Vital Signs Last 24 Hrs  T(C): 36.8 (25 Feb 2022 08:00), Max: 36.8 (25 Feb 2022 08:00)  T(F): 98.3 (25 Feb 2022 08:00), Max: 98.3 (25 Feb 2022 08:00)  HR: 32 (25 Feb 2022 08:00) (32 - 116)  BP: 74/40 (25 Feb 2022 08:00) (74/40 - 93/51)  BP(mean): 64 (24 Feb 2022 15:35) (64 - 64)  RR: 24 (24 Feb 2022 17:40) (13 - 24)  SpO2: 82% (24 Feb 2022 17:40) (82% - 100%) I&O's Summary    24 Feb 2022 07:01  -  25 Feb 2022 07:00  --------------------------------------------------------  IN: 288.4 mL / OUT: 200 mL / NET: 88.4 mL    GENERAL:  [ ]Alert  [ ]Oriented x   [X]Lethargic  [ ]Cachexia  [ ]Unarousable  [ ]Verbal  [X]Non-Verbal  Behavioral:   [ ] Anxiety  [X] Delirium [ ] Agitation [ ] Other  HEENT:  [ ]Normal   [X]Dry mouth   [ ]ET Tube/Trach  [ ]Oral lesions  PULMONARY:   [ ]Clear [ ]Tachypnea  [X]Audible excessive secretions   [ ]Rhonchi        [ ]Right [ ]Left [ ]Bilateral  [ ]Crackles        [ ]Right [ ]Left [ ]Bilateral  [ ]Wheezing     [ ]Right [ ]Left [ ]Bilateral  [ ]Diminished BS [ ]Right [ ]Left [ ]Bilateral    CARDIOVASCULAR:    [ ]Regular [ ]Irregular [ ]Tachy  [X]Trevon [ ]Murmur [ ]Other  GASTROINTESTINAL:  [ ]Soft  [X]Distended   [X]+BS  [ ]Non tender [ ]Tender  [X]PEG [ ]OGT/ NGT   Last BM:   2/24  GENITOURINARY:  [ ]Normal [ ] Incontinent   [X]Oliguria/Anuria   [ ]Javier  MUSCULOSKELETAL:   [ ]Normal   [ ]Weakness  [X]Bed/Wheelchair bound [ ]Edema  NEUROLOGIC:   [ ]No focal deficits  [X] Cognitive impairment  [ ] Dysphagia [ ]Dysarthria [ ] Paresis [X]Other: RUE tremor.    SKIN:   [ ]Normal  [ ]Rash     [ X] evolving sacral DTI ulcer  [X]y [ ]n  Present on admission      CRITICAL CARE:  [ ] Shock Present  [ ]Septic [ ]Cardiogenic [ ]Neurologic [ ]Hypovolemic  [ ]  Vasopressors [ ]  Inotropes   [ ] Respiratory failure present [ ] Mechanical Ventilation [ ] Non-invasive ventilatory support [ ] High-Flow  [ ] Acute  [ ] Chronic [ ] Hypoxic  [ ] Hypercarbic [ ] Other  [ ] Other organ failure     LABS:                        8.9    10.10 )-----------( 241      ( 24 Feb 2022 00:49 )             28.3   02-24    132<L>  |  98  |  23  ----------------------------<  189<H>  3.6   |  23  |  2.73<H>    Ca    8.6      24 Feb 2022 00:18  Phos  3.5     02-24  Mg     1.8     02-24    TPro  5.8<L>  /  Alb  1.7<L>  /  TBili  0.3  /  DBili  x   /  AST  31  /  ALT  <5<L>  /  AlkPhos  135<H>  02-24        RADIOLOGY & ADDITIONAL STUDIES:    PROTEIN CALORIE MALNUTRITION: [ ] mild [ ] moderate [ ] severe  [ ] underweight [ ] morbid obesity    https://www.andeal.org/vault/8443/web/files/ONC/Table_Clinical%20Characteristics%20to%20Document%20Malnutrition-White%20JV%20et%20al%202012.pdf    Height (cm): 180.3 (02-16-22 @ 17:35)  Weight (kg): 96.4 (02-16-22 @ 17:35)  BMI (kg/m2): 29.7 (02-16-22 @ 17:35)    [X] PPSV2 < or = 30% [ ] significant weight loss [ ] poor nutritional intake [ ] anasarca   Artificial Nutrition [ ]     REFERRALS:   [ ]Chaplaincy  [ ] Hospice  [ ]Child Life  [ ]Social Work  [X]Case management [ ]Holistic Therapy [ ] Physical Therapy [ ] Dietary   Goals of Care Document: Goals of Care Conversation - Advanced Care Planning [MARY Juarez] (02-10-22 @ 19:03)  Goals of Care Conversation - Advanced Care Planning [MARY Juarez] (12-04-21 @ 06:57)     GAP TEAM PALLIATIVE CARE UNIT PROGRESS NOTE:      [  ] Patient on hospice program.    INDICATION FOR PALLIATIVE CARE UNIT SERVICES: S/p palliative extubation, transferred to PCU for further symptomatic focused management.     INTERVAL HPI/OVERNIGHT EVENTS:    Seen and examined at the bedside this am. Per nursing patient with excessive secretions and need for frequent suctioning.  Over the past 24 hrs patient required PRN dilaudid for dyspnea x3 (12pm, 6pm 10pm). Patient non-verbal during ROS questioning, awake during exam this morning.     DNR on chart:   Allergies    adhesives (Rash)  latex (Urticaria)  No Known Drug Allergies    Intolerances    MEDICATIONS  (STANDING):  carbidopa/levodopa  25/100 2.5 Tablet(s) Oral <User Schedule>  carbidopa/levodopa  25/100 2 Tablet(s) Oral <User Schedule>  chlorhexidine 4% Liquid 1 Application(s) Topical <User Schedule>  diVALproex Sprinkle 250 milliGRAM(s) Oral three times a day  doxazosin 1 milliGRAM(s) Oral at bedtime  DULoxetine 20 milliGRAM(s) Oral daily  levothyroxine Injectable 30 MICROGram(s) IV Push at bedtime  midodrine 30 milliGRAM(s) Oral every 8 hours  mirtazapine 7.5 milliGRAM(s) Oral daily  pantoprazole  Injectable 40 milliGRAM(s) IV Push every 12 hours  QUEtiapine 50 milliGRAM(s) Oral at bedtime  trihexyphenidyl 2 milliGRAM(s) Oral three times a day    MEDICATIONS  (PRN):  HYDROmorphone  Injectable 0.5 milliGRAM(s) IV Push every 1 hour PRN Dyspnea  sodium chloride 0.9% lock flush 10 milliLiter(s) IV Push every 1 hour PRN Pre/post blood products, medications, blood draw, and to maintain line patency    ITEMS UNCHECKED ARE NOT PRESENT    PRESENT SYMPTOMS: [X]Unable to obtain as patient non-verbal.   Source if other than patient:  [ ]Family   [X]Team     Pain: [ ] yes [x ] no see pain ads  QOL impact -   Location -                    Aggravating factors -  Quality -  Radiation -  Timing-  Severity (0-10 scale):  Minimal acceptable level (0-10 scale):     Dyspnea:                           [ ]Mild [X]Moderate [ ]Severe  Anxiety:                             [ ]Mild [ ]Moderate [ ]Severe  Fatigue:                             [ ]Mild [ ]Moderate [ ]Severe  Nausea:                             [ ]Mild [ ]Moderate [ ]Severe  Loss of appetite:              [ ]Mild [ ]Moderate [ ]Severe  Constipation:                    [ ]Mild [ ]Moderate [ ]Severe    PAINAD Score: 0    [email or web address].[email or web address]/[email or web address] (Ctrl +  left click to view)  		  Other Symptoms:  [x ]All other review of systems negative     Palliative Performance Status Version 2:         10%         http://[email or web address]/files/news/palliative_performance_scale_[email or web address].pdf  PHYSICAL EXAM:  Vital Signs Last 24 Hrs  T(C): 36.8 (25 Feb 2022 08:00), Max: 36.8 (25 Feb 2022 08:00)  T(F): 98.3 (25 Feb 2022 08:00), Max: 98.3 (25 Feb 2022 08:00)  HR: 32 (25 Feb 2022 08:00) (32 - 116)  BP: 74/40 (25 Feb 2022 08:00) (74/40 - 93/51)  BP(mean): 64 (24 Feb 2022 15:35) (64 - 64)  RR: 24 (24 Feb 2022 17:40) (13 - 24)  SpO2: 82% (24 Feb 2022 17:40) (82% - 100%) I&O's Summary    24 Feb 2022 07:01  -  25 Feb 2022 07:00  --------------------------------------------------------  IN: 288.4 mL / OUT: 200 mL / NET: 88.4 mL    GENERAL:  [ ]Alert  [ ]Oriented x   [X]Lethargic  [ ]Cachexia  [ ]Unarousable  [ ]Verbal  [X]Non-Verbal  Behavioral:   [ ] Anxiety  [X] Delirium [ ] Agitation [ ] Other  HEENT:  [ ]Normal   [X]Dry mouth   [ ]ET Tube/Trach  [ ]Oral lesions  PULMONARY:   [ ]Clear [x ]Tachypnea  [X]Audible excessive secretions   [ ]Rhonchi        [ ]Right [ ]Left [ ]Bilateral  [ ]Crackles        [ ]Right [ ]Left [ ]Bilateral  [ ]Wheezing     [ ]Right [ ]Left [ ]Bilateral  [ ]Diminished BS [ ]Right [ ]Left [ ]Bilateral    CARDIOVASCULAR:    [x ]Regular [ ]Irregular [ ]Tachy  [X]Trevon [ ]Murmur [ ]Other  GASTROINTESTINAL:  [ ]Soft  [X]Distended   [X]+BS  [ x]Non tender [ ]Tender  [X]PEG [ ]OGT/ NGT   Last BM:   2/24  GENITOURINARY:  x[ ]Normal [ ] Incontinent   [X]Oliguria/Anuria   [ ]Javier  MUSCULOSKELETAL:   [ ]Normal   [ ]Weakness  [X]Bed/Wheelchair bound [ x]Edema  NEUROLOGIC:   [ ]No focal deficits  [X] Cognitive impairment  [x ] Dysphagia [ ]Dysarthria [ ] Paresis [X]Other: RUE tremor.    SKIN:   [ ]Normal  [ ]Rash     [ X] evolving sacral DTI ulcer  [X]y [ ]n  Present on admission      CRITICAL CARE:  [ ] Shock Present  [ ]Septic [ ]Cardiogenic [ ]Neurologic [ ]Hypovolemic  [ ]  Vasopressors [ ]  Inotropes   [ ] Respiratory failure present [ ] Mechanical Ventilation [ ] Non-invasive ventilatory support [ ] High-Flow  [ ] Acute  [ ] Chronic [ ] Hypoxic  [ ] Hypercarbic [ ] Other  [x ] Other organ failure brain    LABS:                        8.9    10.10 )-----------( 241      ( 24 Feb 2022 00:49 )             28.3   02-24    132<L>  |  98  |  23  ----------------------------<  189<H>  3.6   |  23  |  2.73<H>    Ca    8.6      24 Feb 2022 00:18  Phos  3.5     02-24  Mg     1.8     02-24    TPro  5.8<L>  /  Alb  1.7<L>  /  TBili  0.3  /  DBili  x   /  AST  31  /  ALT  <5<L>  /  AlkPhos  135<H>  02-24        RADIOLOGY & ADDITIONAL STUDIES: None new.     PROTEIN CALORIE MALNUTRITION: [ ] mild [ ] moderate [ ] severe  [ ] underweight [ ] morbid obesity    https://www.andeal.org/vault/1115/web/files/ONC/Table_Clinical%20Characteristics%20to%20Document%20Malnutrition-White%20JV%20et%20al%377654.pdf    Height (cm): 180.3 (02-16-22 @ 17:35)  Weight (kg): 96.4 (02-16-22 @ 17:35)  BMI (kg/m2): 29.7 (02-16-22 @ 17:35)    [X] PPSV2 < or = 30% [ ] significant weight loss [x ] poor nutritional intake [ ] anasarca   Artificial Nutrition [ ]     REFERRALS:   [ ]Chaplaincy  [ ] Hospice  [ ]Child Life  [x ]Social Work  [X]Case management [ ]Holistic Therapy [ ] Physical Therapy [ ] Dietary   Goals of Care Document: Goals of Care Conversation - Advanced Care Planning [MARY Juarez] (02-10-22 @ 19:03)  Goals of Care Conversation - Advanced Care Planning [MARY Juarez] (12-04-21 @ 06:57)

## 2022-02-25 NOTE — PROGRESS NOTE ADULT - PROBLEM SELECTOR PLAN 4
management per primary team PPSV 10%   Pt requires assistance with all ADLs Waxing and waning mental status. Multifactorial from cognitive decline, prolonged hospital stay, baseline dementia.   -Monitor for reversible signs of delirium including urinary obstruction, constipation, pain, agitation, anxiety.  -Patient not able to verbalize pain however PAINADS score 0, not requiring any PRNs for pain, c/w diluadid

## 2022-02-25 NOTE — PROGRESS NOTE ADULT - PROBLEM SELECTOR PLAN 6
Pt pending transfer to PCU when bed available after extubation for end of life care and symptom management.    For symptom management recommend the following:    For pain: IV dilaudid 0.2mg q4h PRN   For dyspnea: IV dilaudid 0.2mg q4h PRN  For secretions: IV robinul 0.4mg q6h PRN  For anxiety: IV ativan 0.25 mg q6h prn   For nausea/vomiting: IV zofran 4mg q8h PRN   AVOID haldol in setting of Parkinson's disease    For acute issues or uncontrolled symptoms please page palliative team.    Sybil Muniz MD  Geriatrics and Palliative Medicine Attending  [facility] pager: [phone]     The Geriatrics and Palliative Medicine consult service has 24/7 coverage for medical recommendations, including symptom management needs. Pt transferred to the PCU on 2/25.     For symptom management recommend the following:    For pain: IV dilaudid 0.2mg q4h PRN   For dyspnea: IV dilaudid 0.2mg q4h PRN  For secretions: IV robinul 0.4mg q6h PRN  For anxiety: IV ativan 0.25 mg q6h prn   For nausea/vomiting: IV zofran 4mg q8h PRN   AVOID haldol in setting of Parkinson's disease s/p multiple bleeds  recent endoscopy for bleeding ulcer with cauterization  No longer checking CBCs to monitor anemia. PPSV 10%   Pt requires assistance with all ADLs Patient previously in the MICU for shock state, weaned off pressors since 2/23 on midodrine TID .   -Per GOC with family will focus on symptomatic management and  d/c midodrine and monitor off midodrine.

## 2022-02-25 NOTE — PROGRESS NOTE ADULT - PROBLEM SELECTOR PROBLEM 5
Advanced care planning/counseling discussion Acute on chronic renal failure Hypotension At risk for pain

## 2022-02-25 NOTE — PROGRESS NOTE ADULT - PROBLEM SELECTOR PLAN 8
see GOC note from 2/24.   plan is for compassionate withdrawal of care and transition of care to symptom directed s/p multiple bleeds  recent endoscopy for bleeding ulcer with cauterization  No longer checking CBCs to monitor anemia. PPSV 10%   Pt requires assistance with all ADLs

## 2022-02-25 NOTE — PROGRESS NOTE ADULT - PROBLEM SELECTOR PLAN 12
see GOC note from 2/24.   plan is for compassionate withdrawal of care and transition of care to symptom directed

## 2022-02-25 NOTE — PROGRESS NOTE ADULT - ATTENDING COMMENTS
70yo M w/ CAD (s/p CABG 2019), CKD progressing to ARF requiring HD, DM2, Parkinson's Disease, HTN, depression p/w b/l LE edema found to have ARF and NSTEMI w/ new Aflutter.  Pt had prolonged hospital course c/b hemorrhagic shock 2/2 RP bleed, COVID,  further complicated by acute blood loss anemia 2/2 duodenal ulcer w/ active bleeding vessel requiring endoscopic intervention.  Now s/p compassionate extubation in the PCU for management of symptoms and end of life care.  Patient with G-tube, getting multiple medications for agitated behavior, parkinson's, and shock.  Clinically with gastric stasis and reflux, copious secretions and tachypnea.  Discussed these concerns with son at bedside and all agreed to focus on symptoms and that the dying process was present.  Son  educated as to what to expect.  Questions answered.  Emotional support provided.   Time spent in face to face discussion 25 minutes.

## 2022-02-26 NOTE — PROGRESS NOTE ADULT - PROBLEM SELECTOR PLAN 1
Multifactorial, 2/2 volume overload vs. excessive secretions possibly VAP given serratia in the sputum? vs. failure to protect airway from secretions. S/p palliative extubation on 2/24  - C/w supplemental O2 for comfort and dyspnea  - C/w Dilaudid 1mg IVP q1h for dyspnea

## 2022-02-26 NOTE — PROGRESS NOTE ADULT - PROBLEM SELECTOR PLAN 8
Per conversation with son patient actively in the dying process will have minimal benefit from PD medications at this point. Will d/c patient's trihexyphenydyl, carbidopa/levodopa and focus on comfort and symptomatic management.  -Avoid Haldol i/s/o Parkinson disease.

## 2022-02-26 NOTE — PROGRESS NOTE ADULT - PROBLEM SELECTOR PLAN 5
Waxing and waning mental status. Multifactorial from cognitive decline, prolonged hospital stay, baseline dementia.   -Monitor for reversible signs of delirium including urinary obstruction, constipation, pain, agitation, anxiety.

## 2022-02-26 NOTE — PROGRESS NOTE ADULT - PROBLEM SELECTOR PLAN 7
Last HD on 2/24.   D/kassy based on GOC, per discussion goal is for symptom directed care with focus being patient's comfort.

## 2022-02-26 NOTE — PROGRESS NOTE ADULT - SUBJECTIVE AND OBJECTIVE BOX
GAP TEAM PALLIATIVE CARE UNIT PROGRESS NOTE:      [  ] Patient on hospice program.    INDICATION FOR PALLIATIVE CARE UNIT SERVICES: Symptom directed care    INTERVAL HPI/OVERNIGHT EVENTS: Received x1 PRN hydromorphone 0.5mg IV for dyspnea and x1 PRN hydromorphone 0.5mg IV for pain in 24 hours (8am-8am).    DNR on chart:  Allergies    adhesives (Rash)  latex (Urticaria)  No Known Drug Allergies    Intolerances    MEDICATIONS  (STANDING):  chlorhexidine 4% Liquid 1 Application(s) Topical <User Schedule>  glycopyrrolate Injectable 0.4 milliGRAM(s) IV Push every 6 hours  levothyroxine Injectable 30 MICROGram(s) IV Push at bedtime  LORazepam   Injectable 0.5 milliGRAM(s) IV Push every 6 hours  pantoprazole  Injectable 40 milliGRAM(s) IV Push every 12 hours    MEDICATIONS  (PRN):  bisacodyl Suppository 10 milliGRAM(s) Rectal once PRN Constipation  HYDROmorphone  Injectable 1 milliGRAM(s) IV Push every 1 hour PRN Dyspnea  HYDROmorphone  Injectable 1 milliGRAM(s) IV Push every 1 hour PRN Pain  LORazepam   Injectable 0.5 milliGRAM(s) IV Push every 1 hour PRN Anxiety  sodium chloride 0.9% lock flush 10 milliLiter(s) IV Push every 1 hour PRN Pre/post blood products, medications, blood draw, and to maintain line patency    ITEMS UNCHECKED ARE NOT PRESENT    PRESENT SYMPTOMS: [X]Unable to obtain due to poor mentation   Source if other than patient:  [ ]Family   [X]Team     Pain: [X] yes [ ] no  See PAINAD  QOL impact -   Location -                    Aggravating factors -  Quality -  Radiation -  Timing-  Severity (0-10 scale):  Minimal acceptable level (0-10 scale):     Dyspnea:                           [ ]Mild [ ]Moderate [ ]Severe  Anxiety:                             [ ]Mild [ ]Moderate [ ]Severe  Fatigue:                             [ ]Mild [ ]Moderate [ ]Severe  Nausea:                             [ ]Mild [ ]Moderate [ ]Severe  Loss of appetite:              [ ]Mild [ ]Moderate [ ]Severe  Constipation:                    [ ]Mild [ ]Moderate [ ]Severe    PAINAD Score: 6 --> 0    [email or web address].[email or web address]/cog/painad.pdf (Ctrl +  left click to view)  		  Other Symptoms:  []All other review of systems negative     Palliative Performance Status Version 2:         10%         http://[email or web address].org/files/news/palliative_performance_scale_[email or web address].pdf    PHYSICAL EXAM:  Vital Signs Last 24 Hrs  T(C): 36.9 (26 Feb 2022 08:00), Max: 36.9 (26 Feb 2022 08:00)  T(F): 98.4 (26 Feb 2022 08:00), Max: 98.4 (26 Feb 2022 08:00)  HR: 123 (26 Feb 2022 08:00) (123 - 123)  BP: 109/66 (26 Feb 2022 08:00) (109/66 - 109/66)  BP(mean): --  RR: 18 (26 Feb 2022 08:00) (18 - 18)  SpO2: 100% (26 Feb 2022 08:00) (100% - 100%) I&O's Summary    GENERAL:  [ ]Alert  [ ]Oriented x   [X]Lethargic  [ ]Cachexia  [ ]Unarousable  [ ]Verbal  [X]Non-Verbal  Behavioral:   [ ]Anxiety  [ ]Delirium [ ]Agitation [ ]Other  HEENT:  [ ]Normal   [X]Dry mouth   [ ]ET Tube/Trach  [ ]Oral lesions  PULMONARY:   [X]Clear [ ]Tachypnea  [ ]Audible excessive secretions   [ ]Rhonchi        [ ]Right [ ]Left [ ]Bilateral  [ ]Crackles        [ ]Right [ ]Left [ ]Bilateral  [ ]Wheezing     [ ]Right [ ]Left [ ]Bilateral  [ ]Diminished BS [ ]Right [ ]Left [ ]Bilateral    CARDIOVASCULAR:    [X]Regular [ ]Irregular [ ]Tachy  [ ]Trevon [ ]Murmur [ ]Other  GASTROINTESTINAL:  [X]Soft  [ ]Distended   [ ]+BS  [X]Non tender [ ]Tender  [ ]PEG [ ]OGT/ NGT   Last BM:   2/23  GENITOURINARY:  [ ]Normal [X] Incontinent   [ ]Oliguria/Anuria   [ ]Javier  MUSCULOSKELETAL:   [ ]Normal   [ ]Weakness  [X]Bed/Wheelchair bound [ ]Edema  NEUROLOGIC:   [ ]No focal deficits  [X] Cognitive impairment  [ ] Dysphagia [ ]Dysarthria [ ] Paresis [ ]Other   SKIN:   [X]Normal  [ ]Rash     [ ]Pressure ulcer(s)  [ ]y [ ]n  Present on admission      CRITICAL CARE:  [ ] Shock Present  [ ]Septic [ ]Cardiogenic [ ]Neurologic [ ]Hypovolemic  [ ]  Vasopressors [ ]  Inotropes   [ ] Respiratory failure present [ ] Mechanical Ventilation [ ] Non-invasive ventilatory support [ ] High-Flow  [ ] Acute  [ ] Chronic [ ] Hypoxic  [ ] Hypercarbic [ ] Other  [ ] Other organ failure     LABS:      RADIOLOGY & ADDITIONAL STUDIES:    PROTEIN CALORIE MALNUTRITION: [ ] mild [ ] moderate [ ] severe  [ ] underweight [ ] morbid obesity    https://www.andeal.org/vault/2440/web/files/ONC/Table_Clinical%20Characteristics%20to%20Document%20Malnutrition-White%20JV%20et%20al%027743.pdf    [X] PPSV2 < or = 30% [ ] significant weight loss [X] poor nutritional intake [ ] anasarca   Artificial Nutrition [ ]     REFERRALS:   [ ]Chaplaincy  [ ] Hospice  [ ]Child Life  [X]Social Work  [ ]Case management [ ]Holistic Therapy [ ] Physical Therapy [ ] Dietary    Goals of Care Document:

## 2022-02-26 NOTE — PROGRESS NOTE ADULT - ATTENDING COMMENTS
72yo M w/ CAD (s/p CABG 2019), CKD progressing to ARF requiring HD, DM2, Parkinson's Disease, HTN, depression p/w b/l LE edema found to have ARF and NSTEMI w/ new Aflutter.  Pt had prolonged hospital course c/b hemorrhagic shock 2/2 RP bleed, COVID,  further complicated by acute blood loss anemia 2/2 duodenal ulcer w/ active bleeding vessel requiring endoscopic intervention.  Now s/p compassionate extubation (2/24) in the PCU for management of symptoms and end of life care.      > Symptom directed care as above. D/c 0.5mg IV dilaudid. Continue 1mg IV dilaudid prns pain/dyspnea   > Continue care in PCU

## 2022-02-26 NOTE — PROGRESS NOTE ADULT - PROBLEM SELECTOR PLAN 2
Patient with excessive secretions previously. Improved on ATC glycopyrrolate.  - C/w Glycopyrrolate 0.4mg IV q6h ATC for excessive secretions.

## 2022-02-26 NOTE — PROGRESS NOTE ADULT - PROBLEM SELECTOR PLAN 3
Pt unable to verbalize symptoms. PAINAD score overnight up to a 6. Used x1 PRN Dilaudid 0.5mg IV in 24 hours (8am-8am).  - Increase Dilaudid 1mg IVP q1h for dyspnea

## 2022-02-26 NOTE — PROGRESS NOTE ADULT - ASSESSMENT
72yo M w/ CAD (s/p CABG 2019), CKD (unknown stage), DM2, Parkinson's Disease, HTN, depression p/w b/l LE edema found to have ARF and NSTEMI w/ new Aflutter started on heparin gtt and HD. Pt had prolonged hospital course c/b hemorrhagic shock 2/2 RP bleed, COVID. Course further complicated by acute blood loss anemia 2/2 duodenal ulcer w/ active bleeding vessel requiring endoscopic intervention.  Now palliatively extubated and transferred to the PCU for further management.

## 2022-02-27 NOTE — PROGRESS NOTE ADULT - ASSESSMENT
70yo M w/ CAD (s/p CABG 2019), CKD (unknown stage), DM2, Parkinson's Disease, HTN, depression p/w b/l LE edema found to have ARF and NSTEMI w/ new Aflutter started on heparin gtt and HD. Pt had prolonged hospital course c/b hemorrhagic shock 2/2 RP bleed, COVID. Course further complicated by acute blood loss anemia 2/2 duodenal ulcer w/ active bleeding vessel requiring endoscopic intervention.  Now palliatively extubated and transferred to the PCU for further management.

## 2022-02-27 NOTE — PROGRESS NOTE ADULT - SUBJECTIVE AND OBJECTIVE BOX
GAP TEAM PALLIATIVE CARE UNIT PROGRESS NOTE:      [  ] Patient on hospice program.    INDICATION FOR PALLIATIVE CARE UNIT SERVICES: Symptom directed care    INTERVAL HPI/OVERNIGHT EVENTS: Pt required ativan 0.5mg IV x1. He was seen this morning, unarouseable, breathing pattern is changing.     DNR on chart:   Allergies    adhesives (Rash)  latex (Urticaria)  No Known Drug Allergies    Intolerances    MEDICATIONS  (STANDING):  chlorhexidine 4% Liquid 1 Application(s) Topical <User Schedule>  glycopyrrolate Injectable 0.4 milliGRAM(s) IV Push every 6 hours  levothyroxine Injectable 30 MICROGram(s) IV Push at bedtime  LORazepam   Injectable 0.5 milliGRAM(s) IV Push every 6 hours  pantoprazole  Injectable 40 milliGRAM(s) IV Push every 12 hours    MEDICATIONS  (PRN):  bisacodyl Suppository 10 milliGRAM(s) Rectal once PRN Constipation  HYDROmorphone  Injectable 1 milliGRAM(s) IV Push every 1 hour PRN Dyspnea  HYDROmorphone  Injectable 1 milliGRAM(s) IV Push every 1 hour PRN Pain  LORazepam   Injectable 0.5 milliGRAM(s) IV Push every 1 hour PRN Anxiety  sodium chloride 0.9% lock flush 10 milliLiter(s) IV Push every 1 hour PRN Pre/post blood products, medications, blood draw, and to maintain line patency    ITEMS UNCHECKED ARE NOT PRESENT    PRESENT SYMPTOMS: [x ]Unable to obtain due to poor mentation   Source if other than patient:  [ ]Family   [ ]Team     Pain: [ ] yes [ ] no- See PAIN AD score   QOL impact -   Location -                    Aggravating factors -  Quality -  Radiation -  Timing-  Severity (0-10 scale):  Minimal acceptable level (0-10 scale):     Dyspnea:                           [ ]Mild [ ]Moderate [ ]Severe  Anxiety:                             [ ]Mild [ ]Moderate [ ]Severe  Fatigue:                             [ ]Mild [ ]Moderate [ ]Severe  Nausea:                             [ ]Mild [ ]Moderate [ ]Severe  Loss of appetite:              [ ]Mild [ ]Moderate [ ]Severe  Constipation:                    [ ]Mild [ ]Moderate [ ]Severe    PAINAD Score: 0    [email or web address]/[email or web address] (Ctrl +  left click to view)  		  Other Symptoms:  [ ]All other review of systems negative     Palliative Performance Status Version 2:     10    %         http://npcrc.org/files/news/palliative_performance_scale_ppsv2.pdf    PHYSICAL EXAM:  Vital Signs Last 24 Hrs  T(C): 37.3 (27 Feb 2022 07:27), Max: 37.3 (27 Feb 2022 07:27)  T(F): 99.2 (27 Feb 2022 07:27), Max: 99.2 (27 Feb 2022 07:27)  HR: 127 (27 Feb 2022 07:27) (127 - 127)  BP: 120/72 (27 Feb 2022 07:27) (120/72 - 120/72)  BP(mean): --  RR: 20 (27 Feb 2022 07:27) (20 - 20)  SpO2: 100% (27 Feb 2022 07:27) (100% - 100%) I&O's Summary    GENERAL:  [ ]Alert  [ ]Oriented x   [X]Lethargic  [ ]Cachexia  [ ]Unarousable  [ ]Verbal  [X]Non-Verbal  Behavioral:   [ ]Anxiety  [ ]Delirium [ ]Agitation [ ]Other  HEENT:  [ ]Normal   [X]Dry mouth   [ ]ET Tube/Trach  [ ]Oral lesions  PULMONARY:   [X]Clear [ ]Tachypnea  [ ]Audible excessive secretions   [ ]Rhonchi        [ ]Right [ ]Left [ ]Bilateral  [ ]Crackles        [ ]Right [ ]Left [ ]Bilateral  [ ]Wheezing     [ ]Right [ ]Left [ ]Bilateral  [ ]Diminished BS [ ]Right [ ]Left [ ]Bilateral    CARDIOVASCULAR:    [X]Regular [ ]Irregular [ ]Tachy  [ ]Trevon [ ]Murmur [ ]Other  GASTROINTESTINAL:  [X]Soft  [ ]Distended   [ ]+BS  [X]Non tender [ ]Tender  [ ]PEG [ ]OGT/ NGT   Last BM:   2/24  GENITOURINARY:  [ ]Normal [X] Incontinent   [ ]Oliguria/Anuria   [ ]Javier  MUSCULOSKELETAL:   [ ]Normal   [ ]Weakness  [X]Bed/Wheelchair bound [ ]Edema  NEUROLOGIC:   [ ]No focal deficits  [X] Cognitive impairment  [ ] Dysphagia [ ]Dysarthria [ ] Paresis [ ]Other   SKIN:   [X]Normal  [ ]Rash     [ ]Pressure ulcer(s)  [ ]y [ ]n  Present on admission      CRITICAL CARE:  [ ] Shock Present  [ ]Septic [ ]Cardiogenic [ ]Neurologic [ ]Hypovolemic  [ ]  Vasopressors [ ]  Inotropes   [ ] Respiratory failure present [ ] Mechanical Ventilation [ ] Non-invasive ventilatory support [ ] High-Flow  [ ] Acute  [ ] Chronic [ ] Hypoxic  [ ] Hypercarbic [ ] Other  [ ] Other organ failure     LABS: n/a     RADIOLOGY & ADDITIONAL STUDIES: n/a     PROTEIN CALORIE MALNUTRITION: [ ] mild [ ] moderate [ ] severe  [ ] underweight [ ] morbid obesity    https://www.andeal.org/vault/5520/web/files/ONC/Table_Clinical%20Characteristics%20to%20Document%20Malnutrition-White%20JV%20et%20al%460968.pdf    Height (cm): 180.3 (02-16-22 @ 17:35)  Weight (kg): 96.4 (02-16-22 @ 17:35)  BMI (kg/m2): 29.7 (02-16-22 @ 17:35)    [x ] PPSV2 < or = 30% [ ] significant weight loss [ ] poor nutritional intake [ ] anasarca   Artificial Nutrition [ ]     REFERRALS:   [ ]Chaplaincy  [ ] Hospice  [ ]Child Life  [x ]Social Work  [ ]Case management [ ]Holistic Therapy [ ] Physical Therapy [ ] Dietary

## 2022-02-27 NOTE — PROGRESS NOTE ADULT - PROBLEM SELECTOR PLAN 3
Pt unable to verbalize symptoms. PAINAD score 0 over last 24 hours. Used x0 PRN Dilaudid 0.5mg IV in 24 hours (8am-8am).  - Increase Dilaudid 1mg IVP q1h for dyspnea

## 2022-02-28 NOTE — PROGRESS NOTE ADULT - PROBLEM SELECTOR PLAN 7
PPSV 10% Pt requires nursing assistance with all ADLs  supportive care  Turn and position  Continue with good skin care

## 2022-02-28 NOTE — PROVIDER CONTACT NOTE (OTHER) - BACKGROUND
70yo M w/ PMHx of CAD (s/p CABG 2019), CKD (unknown stage), DM2, Parkinson's Disease, HTN, depression presents with new onset acute heart failure exacerbation
72yo M w/ PMHx of CAD (s/p CABG 2019), CKD (unknown stage), DM2, Parkinson's Disease, HTN, depression presents with new onset acute heart failure exacerbation
CHF, h/x of right permacath and left AV Fistula
Patient has DNR order.
Patient is a 71 year old with PMH of Cad
h/x of pulling permacath and pulling staples on surgical incision site
Dx: Acute on chronic systolic congestive heart failure. Hx: CAD, DM, hypercholesterolemia, HTN
Patient NPO for Interventional Radiology procedure for left AV fistula.
Patient admitted for CHF
Rt chest permacath and newly placed AVF w/ staples. D/t confusion and agitation, pt has a history of pulling out permacath and picking at lines and wounds
left arm swelling, s/p AV fistula placement
pATIENT WITH chf ON HEMODIALYSIS
patient admitted for CHF
pt adm with Heart failure, hxcad,dm,htn,ckd
pt admitted 10/21/21 for heart failure
Pt receives HD M/W/F. Last H/H 7.1/21.1
Admitting dx CHF, now requiring hemodialysis. Pt w/ confusion and delusions since admission. Currently on Seroquel
Pt with HF exacerbation and ABBY requiring dialysis admitted to MICU for RP bleed.
pt with PMH: CAD, DM-2, HTN, ESRD, HLD,
Pt admitted with CHF
agitation, covid, bilateral mittens
agitation, covid, bilateral mittens
COVID + Dialysis hx of RP bleed, PEG
Pt admitted for HF
Dx: Acute on Chronic  CHF, TTE ef 35%, mod- severe LV dysfunction, hypokinesis, decreased Rv FUNCTION, B/L pleural effusion, 1.5 fluid restriction, NSTEMI, Trops > 87 >94>88, aflutter, ABBY on CKD.

## 2022-02-28 NOTE — PROGRESS NOTE ADULT - PROVIDER SPECIALTY LIST ADULT
Cardiology
Gastroenterology
Internal Medicine
Intervent Radiology
MICU
Nephrology
Neurology
Neurology
Palliative Care
Pulmonology
Vascular Surgery
Anesthesia
Gastroenterology
Internal Medicine
Intervent Radiology
MICU
Nephrology
Neurology
Palliative Care
Palliative Care
Pulmonology
Pulmonology
Vascular Surgery
Wound Care
Wound Care
Endocrinology
Endocrinology
Gastroenterology
Internal Medicine
Intervent Radiology
MICU
Nephrology
Neurology
Pulmonology
Vascular Surgery
Wound Care
Cardiology
Endocrinology
Gastroenterology
Infectious Disease
Internal Medicine
Nephrology
Pulmonology
Vascular Surgery
Wound Care
Cardiology
Endocrinology
Gastroenterology
Internal Medicine
MICU
Nephrology
Endocrinology
Internal Medicine
Internal Medicine
Vascular Surgery
Vascular Surgery
Endocrinology
Palliative Care
Vascular Surgery
Endocrinology
Internal Medicine
Internal Medicine
Endocrinology
Internal Medicine
Endocrinology
Internal Medicine
Endocrinology
Cardiology
Endocrinology
Cardiology
Endocrinology
Cardiology
Endocrinology
Palliative Care
Endocrinology
Cardiology
Endocrinology
Palliative Care
Endocrinology
Palliative Care
Endocrinology
Internal Medicine
Palliative Care

## 2022-02-28 NOTE — PROGRESS NOTE ADULT - PROBLEM SELECTOR PLAN 4
- Continue with Ativan 0.5 mg IV q6h ATC  - Continue with Ativan 0.5 mg q1h PRN ( 0 required within a 24hr period 8am-8am)

## 2022-02-28 NOTE — PROGRESS NOTE ADULT - REASON FOR ADMISSION
leg swelling

## 2022-02-28 NOTE — PROGRESS NOTE ADULT - ATTENDING SUPERVISION STATEMENT
ACP
ACP/Resident
Resident
ACP
Resident
ACP
ACP
Resident
Resident/Fellow
Resident
ACP
Fellow
ACP
Resident

## 2022-02-28 NOTE — PROGRESS NOTE ADULT - SUBJECTIVE AND OBJECTIVE BOX
GAP TEAM PALLIATIVE CARE UNIT PROGRESS NOTE:      [  ] Patient on hospice program.    INDICATION FOR PALLIATIVE CARE UNIT SERVICES: Symptom management     INTERVAL HPI/OVERNIGHT EVENTS: Chart reviewed. The patient is seen and examined at the bedside. He required PRN Dilaudid X3 for dyspnea within a 24hr period 8am-8am    DNR on chart:   Allergies    adhesives (Rash)  latex (Urticaria)  No Known Drug Allergies    Intolerances    MEDICATIONS  (STANDING):  chlorhexidine 4% Liquid 1 Application(s) Topical <User Schedule>  glycopyrrolate Injectable 0.4 milliGRAM(s) IV Push every 6 hours  HYDROmorphone  Injectable 1 milliGRAM(s) IV Push every 6 hours  levothyroxine Injectable 30 MICROGram(s) IV Push at bedtime  LORazepam   Injectable 0.5 milliGRAM(s) IV Push every 6 hours  pantoprazole  Injectable 40 milliGRAM(s) IV Push every 12 hours    MEDICATIONS  (PRN):  acetaminophen    Suspension .. 650 milliGRAM(s) Oral every 6 hours PRN Temp greater or equal to 38C (100.4F)  HYDROmorphone  Injectable 1 milliGRAM(s) IV Push every 1 hour PRN Dyspnea  HYDROmorphone  Injectable 1 milliGRAM(s) IV Push every 1 hour PRN Pain  LORazepam   Injectable 0.5 milliGRAM(s) IV Push every 1 hour PRN Anxiety  sodium chloride 0.9% lock flush 10 milliLiter(s) IV Push every 1 hour PRN Pre/post blood products, medications, blood draw, and to maintain line patency    ITEMS UNCHECKED ARE NOT PRESENT    PRESENT SYMPTOMS: [ X]Unable to obtain due to poor mentation   Source if other than patient:  [ ]Family   [X ]Team     Pain: [X ] yes [ ] no see pain ad score  QOL impact -   Location -                    Aggravating factors -  Quality -  Radiation -  Timing-  Severity (0-10 scale):  Minimal acceptable level (0-10 scale):     Dyspnea:                           [ ]Mild [ ]Moderate [ ]Severe  Anxiety:                             [ ]Mild [ ]Moderate [ ]Severe  Fatigue:                             [ ]Mild [ ]Moderate [ ]Severe  Nausea:                             [ ]Mild [ ]Moderate [ ]Severe  Loss of appetite:              [ ]Mild [ ]Moderate [ ]Severe  Constipation:                    [ ]Mild [ ]Moderate [ ]Severe    PAINAD Score: 5    [email or web address]/cog/painad.[email or web address] (Ctrl +  left click to view)  		  Other Symptoms:  [ ]All other review of systems negative- unable to assess     Palliative Performance Status Version 2: 10%         http://npcrc.org/files/news/palliative_performance_scale_ppsv2.pdf  PHYSICAL EXAM:  Vital Signs Last 24 Hrs  T(C): 36.6 (28 Feb 2022 08:34), Max: 36.6 (28 Feb 2022 08:34)  T(F): 97.9 (28 Feb 2022 08:34), Max: 97.9 (28 Feb 2022 08:34)  HR: 109 (28 Feb 2022 08:34) (109 - 109)  BP: 99/63 (28 Feb 2022 08:34) (99/63 - 99/63)  BP(mean): --  RR: 20 (28 Feb 2022 08:34) (20 - 20)  SpO2: 100% (28 Feb 2022 08:34) (100% - 100%) I&O's Summary  GENERAL:  [ ]Alert  [ ]Oriented x   [X ]Lethargic  [ ]Cachexia  [ ]Unarousable  [ ]Verbal  [X ]Non-Verbal  Behavioral:   [ ] Anxiety  [ ] Delirium [ ] Agitation [ ] Other  HEENT:  [ ]Normal   [ X]Dry mouth   [ ]ET Tube/Trach  [ ]Oral lesions  PULMONARY:   [ ]Clear [ ]Tachypnea  [ X]Audible excessive secretions   [ ]Rhonchi        [ ]Right [ ]Left [ ]Bilateral  [ ]Crackles        [ ]Right [ ]Left [ ]Bilateral  [ ]Wheezing     [ ]Right [ ]Left [ ]Bilateral  [ ]Diminshed BS [ ]Right [ ]Left [ ]Bilateral    CARDIOVASCULAR:    [ ]Regular [ ]Irregular [ X]Tachy  [ ]Trevon [ ]Murmur [ ]Other  GASTROINTESTINAL:  [ X]Soft  [ ]Distended   [ X]+BS  [ ]Non tender [ ]Tender  [ ]PEG [ ]OGT/ NGT   Last BM: 2/24/22  GENITOURINARY:  [ ]Normal [ X] Incontinent   [X ]Oliguria/Anuria   [ ]Javier  MUSCULOSKELETAL:   [ ]Normal   [ X]Weakness  [X ]Bed/Wheelchair bound [ ]Edema  NEUROLOGIC:   [ ]No focal deficits  [X ] Cognitive impairment  [ ] Dysphagia [ ]Dysarthria [ ] Paresis [ ]Other   SKIN:   [ ]Normal  [ ]Rash     [ X]Pressure ulcer(s)  [ ]y [ ]n  Present on admission  DTI on the sacrum. Please see nursing assessment for further details for which I have reviewed.    CRITICAL CARE:  [ ] Shock Present  [ ]Septic [ ]Cardiogenic [ ]Neurologic [ ]Hypovolemic  [ ]  Vasopressors [ ]  Inotropes   [ ] Respiratory failure present [ ] Mechanical Ventilation [ ] Non-invasive ventilatory support [ ] High-Flow  [ ] Acute  [ ] Chronic [ ] Hypoxic  [ ] Hypercarbic [ ] Other  [X ] Other organ failure- kidneys    LABS: reviewed    RADIOLOGY & ADDITIONAL STUDIES: Reviewed    PROTEIN CALORIE MALNUTRITION: [ ] mild [ ] moderate [X ] severe- please see the nutritionist note  [ ] underweight [ ] morbid obesity    https://www.andeal.org/vault/2440/web/files/ONC/Table_Clinical%20Characteristics%20to%20Document%20Malnutrition-White%20JV%20et%20al%012281.pdf    Height (cm): 180.3 (02-16-22 @ 17:35)  Weight (kg): 96.4 (02-16-22 @ 17:35)  BMI (kg/m2): 29.7 (02-16-22 @ 17:35)    [ X] PPSV2 < or = 30% [ ] significant weight loss [ ] poor nutritional intake [ ] anasarca   Artificial Nutrition [ ]     REFERRALS:   [ ]Chaplaincy  [ ] Hospice  [ ]Child Life  [X ]Social Work  [ ]Case management [ ]Holistic Therapy [ ] Physical Therapy [ ] Dietary   Goals of Care Document:

## 2022-02-28 NOTE — PROGRESS NOTE ADULT - PROBLEM/PLAN-1
DISPLAY PLAN FREE TEXT

## 2022-02-28 NOTE — PROGRESS NOTE ADULT - NUTRITIONAL ASSESSMENT
This patient has been assessed with a concern for Malnutrition and has been determined to have a diagnosis/diagnoses of Severe protein-calorie malnutrition.    This patient is being managed with:   Diet NPO after Midnight-     NPO Start Date: 09-Jan-2022   NPO Start Time: 23:59  Except Medications  Entered: Jan 9 2022  9:50PM    Diet Regular-  Supplement Feeding Modality:  Oral  Nepro Cans or Servings Per Day:  1       Frequency:  Daily  Ensure Pudding Cans or Servings Per Day:  1       Frequency:  Three Times a day  Entered: Jan 4 2022  5:48PM    
This patient has been assessed with a concern for Malnutrition and has been determined to have a diagnosis/diagnoses of Severe protein-calorie malnutrition.    This patient is being managed with:   Diet NPO after Midnight-     NPO Start Date: 09-Jan-2022   NPO Start Time: 23:59  Except Medications  Entered: Jan 9 2022  9:50PM    Diet Regular-  Supplement Feeding Modality:  Oral  Nepro Cans or Servings Per Day:  1       Frequency:  Daily  Ensure Pudding Cans or Servings Per Day:  1       Frequency:  Three Times a day  Entered: Jan 4 2022  5:48PM    
This patient has been assessed with a concern for Malnutrition and has been determined to have a diagnosis/diagnoses of Severe protein-calorie malnutrition.    This patient is being managed with:   Diet NPO with Tube Feed-  Tube Feeding Modality: Gastrostomy  Nepro with Carb Steady (NEPRORTH)  Total Volume for 24 Hours (mL): 1980  Bolus  Total Volume of Bolus (mL):  330  Total # of Feeds: 4  Tube Feed Frequency: Every 4 hours   Tube Feed Start Time: 16:00  Bolus Feed Rate (mL per Hour): 330   Bolus Feed Duration (in Hours): 1  Bolus Feed Instructions:  Nepro start @ 120 ml bolus increase 120 ml per bolus as tolerated to GOAL bolus 330 ml 4x daily (08:00 12:00 16:00 20:00).  Free Water Flush Instructions:  defer to team  Entered: Feb  3 2022 11:44AM    
This patient has been assessed with a concern for Malnutrition and has been determined to have a diagnosis/diagnoses of Severe protein-calorie malnutrition.    This patient is being managed with:   Diet NPO with Tube Feed-  Tube Feeding Modality: Gastrostomy  Nepro with Carb Steady (NEPRORTH)  Total Volume for 24 Hours (mL): 1980  Bolus  Total Volume of Bolus (mL):  330  Total # of Feeds: 4  Tube Feed Frequency: Every 4 hours   Tube Feed Start Time: 16:00  Bolus Feed Rate (mL per Hour): 330   Bolus Feed Duration (in Hours): 1  Bolus Feed Instructions:  Nepro start @ 120 ml bolus increase 120 ml per bolus as tolerated to GOAL bolus 330 ml 4x daily (08:00 12:00 16:00 20:00).  Free Water Flush Instructions:  defer to team  Entered: Feb  3 2022 11:44AM    
This patient has been assessed with a concern for Malnutrition and has been determined to have a diagnosis/diagnoses of Severe protein-calorie malnutrition.    This patient is being managed with:   Diet NPO with Tube Feed-  Tube Feeding Modality: Gastrostomy  Nepro with Carb Steady (NEPRORTH)  Total Volume for 24 Hours (mL): 1980  Bolus  Total Volume of Bolus (mL):  330  Total # of Feeds: 4  Tube Feed Frequency: Every 4 hours   Tube Feed Start Time: 16:00  Bolus Feed Rate (mL per Hour): 330   Bolus Feed Duration (in Hours): 1  Bolus Feed Instructions:  Nepro start @ 120 ml bolus increase 120 ml per bolus as tolerated to GOAL bolus 330 ml 4x daily (08:00 12:00 16:00 20:00).  Free Water Flush Instructions:  defer to team  Entered: Jan 12 2022 12:32PM    
This patient has been assessed with a concern for Malnutrition and has been determined to have a diagnosis/diagnoses of Severe protein-calorie malnutrition.    This patient is being managed with:   Diet NPO-  Except Medications  Entered: Feb 17 2022  9:15AM    
This patient has been assessed with a concern for Malnutrition and has been determined to have a diagnosis/diagnoses of Severe protein-calorie malnutrition.    This patient is being managed with:   Diet NPO-  Except Medications  Entered: Feb 17 2022  9:15AM    
This patient has been assessed with a concern for Malnutrition and has been determined to have a diagnosis/diagnoses of Severe protein-calorie malnutrition.    This patient is being managed with:   Diet NPO-  NPO for Procedure/Test     NPO Start Date: 01-Feb-2022   NPO Start Time: 15:05  Entered: Feb 1 2022  3:05PM    Diet NPO with Tube Feed-  Tube Feeding Modality: Gastrostomy  Nepro with Carb Steady (NEPRORTH)  Total Volume for 24 Hours (mL): 1980  Bolus  Total Volume of Bolus (mL):  330  Total # of Feeds: 4  Tube Feed Frequency: Every 4 hours   Tube Feed Start Time: 16:00  Bolus Feed Rate (mL per Hour): 330   Bolus Feed Duration (in Hours): 1  Bolus Feed Instructions:  Nepro start @ 120 ml bolus increase 120 ml per bolus as tolerated to GOAL bolus 330 ml 4x daily (08:00 12:00 16:00 20:00).  Free Water Flush Instructions:  defer to team  Entered: Jan 12 2022 12:32PM    
This patient has been assessed with a concern for Malnutrition and has been determined to have a diagnosis/diagnoses of Severe protein-calorie malnutrition.    This patient is being managed with:   Diet Regular-  Entered: Nov 12 2021  2:04PM    
This patient has been assessed with a concern for Malnutrition and has been determined to have a diagnosis/diagnoses of Severe protein-calorie malnutrition.    This patient is being managed with:   Diet Regular-  Supplement Feeding Modality:  Oral  Nepro Cans or Servings Per Day:  1       Frequency:  Daily  Entered: Nov 18 2021  5:59PM    
This patient has been assessed with a concern for Malnutrition and has been determined to have a diagnosis/diagnoses of Severe protein-calorie malnutrition.    This patient is being managed with:   Diet Soft and Bite Sized-  For patients receiving Renal Replacement - No Protein Restr No Conc K No Conc Phos Low Sodium (RENAL)  Entered: Nov 4 2021 11:13AM    
This patient has been assessed with a concern for Malnutrition and has been determined to have a diagnosis/diagnoses of Severe protein-calorie malnutrition.    This patient is being managed with:   Diet NPO after Midnight-     NPO Start Date: 09-Feb-2022   NPO Start Time: 23:59  Entered: Feb 9 2022  9:28AM    Diet NPO with Tube Feed-  Tube Feeding Modality: Gastrostomy  Nepro with Carb Steady (NEPRORTH)  Total Volume for 24 Hours (mL): 1980  Bolus  Total Volume of Bolus (mL):  330  Total # of Feeds: 4  Tube Feed Frequency: Every 4 hours   Tube Feed Start Time: 16:00  Bolus Feed Rate (mL per Hour): 330   Bolus Feed Duration (in Hours): 1  Bolus Feed Instructions:  Nepro start @ 120 ml bolus increase 120 ml per bolus as tolerated to GOAL bolus 330 ml 4x daily (08:00 12:00 16:00 20:00).  Free Water Flush Instructions:  defer to team  Entered: Feb  3 2022 11:44AM    
This patient has been assessed with a concern for Malnutrition and has been determined to have a diagnosis/diagnoses of Severe protein-calorie malnutrition.    This patient is being managed with:   Diet NPO after Midnight-     NPO Start Date: 09-Feb-2022   NPO Start Time: 23:59  Entered: Feb 9 2022  9:28AM    Diet NPO with Tube Feed-  Tube Feeding Modality: Gastrostomy  Nepro with Carb Steady (NEPRORTH)  Total Volume for 24 Hours (mL): 1980  Bolus  Total Volume of Bolus (mL):  330  Total # of Feeds: 4  Tube Feed Frequency: Every 4 hours   Tube Feed Start Time: 16:00  Bolus Feed Rate (mL per Hour): 330   Bolus Feed Duration (in Hours): 1  Bolus Feed Instructions:  Nepro start @ 120 ml bolus increase 120 ml per bolus as tolerated to GOAL bolus 330 ml 4x daily (08:00 12:00 16:00 20:00).  Free Water Flush Instructions:  defer to team  Entered: Feb  3 2022 11:44AM    
This patient has been assessed with a concern for Malnutrition and has been determined to have a diagnosis/diagnoses of Severe protein-calorie malnutrition.    This patient is being managed with:   Diet NPO after Midnight-     NPO Start Date: 09-Jan-2022   NPO Start Time: 23:59  Except Medications  Entered: Jan 9 2022  9:50PM    Diet Regular-  Supplement Feeding Modality:  Oral  Nepro Cans or Servings Per Day:  1       Frequency:  Daily  Ensure Pudding Cans or Servings Per Day:  1       Frequency:  Three Times a day  Entered: Jan 4 2022  5:48PM    
This patient has been assessed with a concern for Malnutrition and has been determined to have a diagnosis/diagnoses of Severe protein-calorie malnutrition.    This patient is being managed with:   Diet NPO after Midnight-     NPO Start Date: 15-Feb-2022   NPO Start Time: 23:59  Except Medications  Entered: Feb 15 2022  9:56PM    Diet NPO with Tube Feed-  Tube Feeding Modality: Gastrostomy  Nepro with Carb Steady (NEPRORTH)  Total Volume for 24 Hours (mL): 1980  Bolus  Total Volume of Bolus (mL):  330  Total # of Feeds: 4  Tube Feed Frequency: Every 4 hours   Tube Feed Start Time: 16:00  Bolus Feed Rate (mL per Hour): 330   Bolus Feed Duration (in Hours): 1  Bolus Feed Instructions:  Nepro start @ 120 ml bolus increase 120 ml per bolus as tolerated to GOAL bolus 330 ml 4x daily (08:00 12:00 16:00 20:00).  Free Water Flush Instructions:  defer to team  Entered: Feb  3 2022 11:44AM    
This patient has been assessed with a concern for Malnutrition and has been determined to have a diagnosis/diagnoses of Severe protein-calorie malnutrition.    This patient is being managed with:   Diet NPO with Tube Feed-  Tube Feeding Modality: Gastrostomy  Nepro with Carb Steady (NEPRORTH)  Total Volume for 24 Hours (mL): 1980  Bolus  Total Volume of Bolus (mL):  330  Total # of Feeds: 4  Tube Feed Frequency: Every 4 hours   Tube Feed Start Time: 16:00  Bolus Feed Rate (mL per Hour): 330   Bolus Feed Duration (in Hours): 1  Bolus Feed Instructions:  Nepro start @ 120 ml bolus increase 120 ml per bolus as tolerated to GOAL bolus 330 ml 4x daily (08:00 12:00 16:00 20:00).  Free Water Flush Instructions:  defer to team  Entered: Feb  3 2022 11:44AM    
This patient has been assessed with a concern for Malnutrition and has been determined to have a diagnosis/diagnoses of Severe protein-calorie malnutrition.    This patient is being managed with:   Diet NPO with Tube Feed-  Tube Feeding Modality: Gastrostomy  Nepro with Carb Steady (NEPRORTH)  Total Volume for 24 Hours (mL): 1980  Bolus  Total Volume of Bolus (mL):  330  Total # of Feeds: 4  Tube Feed Frequency: Every 4 hours   Tube Feed Start Time: 16:00  Bolus Feed Rate (mL per Hour): 330   Bolus Feed Duration (in Hours): 1  Bolus Feed Instructions:  Nepro start @ 120 ml bolus increase 120 ml per bolus as tolerated to GOAL bolus 330 ml 4x daily (08:00 12:00 16:00 20:00).  Free Water Flush Instructions:  defer to team  Entered: Jan 12 2022 12:32PM    
This patient has been assessed with a concern for Malnutrition and has been determined to have a diagnosis/diagnoses of Severe protein-calorie malnutrition.    This patient is being managed with:   Diet NPO with Tube Feed-  Tube Feeding Modality: Nasogastric  Nepro with Carb Steady (NEPRORTH)  Total Volume for 24 Hours (mL): 1320  Continuous  Starting Tube Feed Rate {mL per Hour}: 10  Increase Tube Feed Rate by (mL): 10     Every 4 hours  Until Goal Tube Feed Rate (mL per Hour): 55  Tube Feed Duration (in Hours): 24  Tube Feed Start Time: 11:00  Tube Feed Stop Time: 05:00  Entered: Nov 1 2021  3:49PM    
This patient has been assessed with a concern for Malnutrition and has been determined to have a diagnosis/diagnoses of Severe protein-calorie malnutrition.    This patient is being managed with:   Diet NPO with Tube Feed-  Tube Feeding Modality: Orogastric  Glucerna 1.5 Lasha (GLUCERNA1.5RTH)  Total Volume for 24 Hours (mL): 720  Continuous  Starting Tube Feed Rate {mL per Hour}: 10  Increase Tube Feed Rate by (mL): 10     Every 4 hours  Until Goal Tube Feed Rate (mL per Hour): 40  Tube Feed Duration (in Hours): 18  Tube Feed Start Time: 11:00  Entered: Feb 22 2022  4:22PM    
This patient has been assessed with a concern for Malnutrition and has been determined to have a diagnosis/diagnoses of Severe protein-calorie malnutrition.    This patient is being managed with:   Diet NPO-  Except Medications  Entered: Feb 17 2022  9:15AM    
This patient has been assessed with a concern for Malnutrition and has been determined to have a diagnosis/diagnoses of Severe protein-calorie malnutrition.    This patient is being managed with:   Diet NPO-  Except Medications  Entered: Feb 17 2022  9:15AM    
This patient has been assessed with a concern for Malnutrition and has been determined to have a diagnosis/diagnoses of Severe protein-calorie malnutrition.    This patient is being managed with:   Diet NPO-  NPO for Procedure/Test     NPO Start Date: 01-Feb-2022   NPO Start Time: 15:05  Entered: Feb 1 2022  3:05PM    Diet NPO with Tube Feed-  Tube Feeding Modality: Gastrostomy  Nepro with Carb Steady (NEPRORTH)  Total Volume for 24 Hours (mL): 1980  Bolus  Total Volume of Bolus (mL):  330  Total # of Feeds: 4  Tube Feed Frequency: Every 4 hours   Tube Feed Start Time: 16:00  Bolus Feed Rate (mL per Hour): 330   Bolus Feed Duration (in Hours): 1  Bolus Feed Instructions:  Nepro start @ 120 ml bolus increase 120 ml per bolus as tolerated to GOAL bolus 330 ml 4x daily (08:00 12:00 16:00 20:00).  Free Water Flush Instructions:  defer to team  Entered: Jan 12 2022 12:32PM    
This patient has been assessed with a concern for Malnutrition and has been determined to have a diagnosis/diagnoses of Severe protein-calorie malnutrition.    This patient is being managed with:   Diet NPO-  NPO for Procedure/Test     NPO Start Date: 01-Feb-2022   NPO Start Time: 15:05  Entered: Feb 1 2022  3:05PM    Diet NPO with Tube Feed-  Tube Feeding Modality: Gastrostomy  Nepro with Carb Steady (NEPRORTH)  Total Volume for 24 Hours (mL): 1980  Bolus  Total Volume of Bolus (mL):  330  Total # of Feeds: 4  Tube Feed Frequency: Every 4 hours   Tube Feed Start Time: 16:00  Bolus Feed Rate (mL per Hour): 330   Bolus Feed Duration (in Hours): 1  Bolus Feed Instructions:  Nepro start @ 120 ml bolus increase 120 ml per bolus as tolerated to GOAL bolus 330 ml 4x daily (08:00 12:00 16:00 20:00).  Free Water Flush Instructions:  defer to team  Entered: Jan 12 2022 12:32PM    
This patient has been assessed with a concern for Malnutrition and has been determined to have a diagnosis/diagnoses of Severe protein-calorie malnutrition.    This patient is being managed with:   Diet Regular-  Entered: Nov 9 2021 10:33AM    
This patient has been assessed with a concern for Malnutrition and has been determined to have a diagnosis/diagnoses of Severe protein-calorie malnutrition.    This patient is being managed with:   Diet Regular-  Supplement Feeding Modality:  Oral  Nepro Cans or Servings Per Day:  1       Frequency:  Daily  Ensure Pudding Cans or Servings Per Day:  1       Frequency:  Three Times a day  Entered: Jan 4 2022  5:48PM    
This patient has been assessed with a concern for Malnutrition and has been determined to have a diagnosis/diagnoses of Severe protein-calorie malnutrition.    This patient is being managed with:   Diet Regular-  Supplement Feeding Modality:  Oral  Nepro Cans or Servings Per Day:  1       Frequency:  Daily  Entered: Nov 18 2021  5:59PM    
This patient has been assessed with a concern for Malnutrition and has been determined to have a diagnosis/diagnoses of Severe protein-calorie malnutrition.    This patient is being managed with:   Diet Soft and Bite Sized-  For patients receiving Renal Replacement - No Protein Restr No Conc K No Conc Phos Low Sodium (RENAL)  Entered: Nov 4 2021 11:13AM    
This patient has been assessed with a concern for Malnutrition and has been determined to have a diagnosis/diagnoses of Severe protein-calorie malnutrition.    This patient is being managed with:   Diet NPO with Tube Feed-  Tube Feeding Modality: Gastrostomy  Nepro with Carb Steady (NEPRORTH)  Total Volume for 24 Hours (mL): 1980  Bolus  Total Volume of Bolus (mL):  330  Total # of Feeds: 4  Tube Feed Frequency: Every 4 hours   Tube Feed Start Time: 16:00  Bolus Feed Rate (mL per Hour): 330   Bolus Feed Duration (in Hours): 1  Bolus Feed Instructions:  Nepro start @ 120 ml bolus increase 120 ml per bolus as tolerated to GOAL bolus 330 ml 4x daily (08:00 12:00 16:00 20:00).  Free Water Flush Instructions:  defer to team  Entered: Feb  3 2022 11:44AM    
This patient has been assessed with a concern for Malnutrition and has been determined to have a diagnosis/diagnoses of Severe protein-calorie malnutrition.    This patient is being managed with:   Diet NPO with Tube Feed-  Tube Feeding Modality: Gastrostomy  Nepro with Carb Steady (NEPRORTH)  Total Volume for 24 Hours (mL): 1980  Bolus  Total Volume of Bolus (mL):  330  Total # of Feeds: 4  Tube Feed Frequency: Every 4 hours   Tube Feed Start Time: 16:00  Bolus Feed Rate (mL per Hour): 330   Bolus Feed Duration (in Hours): 1  Bolus Feed Instructions:  Nepro start @ 120 ml bolus increase 120 ml per bolus as tolerated to GOAL bolus 330 ml 4x daily (08:00 12:00 16:00 20:00).  Free Water Flush Instructions:  defer to team  Entered: Jan 12 2022 12:32PM    
This patient has been assessed with a concern for Malnutrition and has been determined to have a diagnosis/diagnoses of Severe protein-calorie malnutrition.    This patient is being managed with:   Diet NPO with Tube Feed-  Tube Feeding Modality: Orogastric  Glucerna 1.5 Lasha (GLUCERNA1.5RTH)  Total Volume for 24 Hours (mL): 720  Continuous  Starting Tube Feed Rate {mL per Hour}: 10  Increase Tube Feed Rate by (mL): 10     Every 4 hours  Until Goal Tube Feed Rate (mL per Hour): 40  Tube Feed Duration (in Hours): 18  Tube Feed Start Time: 11:00  Entered: Feb 22 2022  4:22PM    
This patient has been assessed with a concern for Malnutrition and has been determined to have a diagnosis/diagnoses of Severe protein-calorie malnutrition.    This patient is being managed with:   Diet NPO-  Except Medications  Entered: Feb 17 2022  9:15AM    
This patient has been assessed with a concern for Malnutrition and has been determined to have a diagnosis/diagnoses of Severe protein-calorie malnutrition.    This patient is being managed with:   Diet NPO-  Except Medications  Entered: Feb 17 2022  9:15AM    
This patient has been assessed with a concern for Malnutrition and has been determined to have a diagnosis/diagnoses of Severe protein-calorie malnutrition.    This patient is being managed with:   Diet Regular-  Entered: Nov 12 2021  2:04PM    
This patient has been assessed with a concern for Malnutrition and has been determined to have a diagnosis/diagnoses of Severe protein-calorie malnutrition.    This patient is being managed with:   Diet Regular-  Supplement Feeding Modality:  Oral  Nepro Cans or Servings Per Day:  1       Frequency:  Daily  Ensure Pudding Cans or Servings Per Day:  1       Frequency:  Three Times a day  Entered: Jan 4 2022  5:48PM    
This patient has been assessed with a concern for Malnutrition and has been determined to have a diagnosis/diagnoses of Severe protein-calorie malnutrition.    This patient is being managed with:   Diet Regular-  Supplement Feeding Modality:  Oral  Nepro Cans or Servings Per Day:  1       Frequency:  Daily  Ensure Pudding Cans or Servings Per Day:  1       Frequency:  Three Times a day  Entered: Jan 4 2022  5:48PM    Diet Regular-  Supplement Feeding Modality:  Oral  Nepro Cans or Servings Per Day:  1       Frequency:  Daily  Entered: Nov 18 2021  5:59PM    The following pending diet order is being considered for treatment of Severe protein-calorie malnutrition:null
This patient has been assessed with a concern for Malnutrition and has been determined to have a diagnosis/diagnoses of Severe protein-calorie malnutrition.    This patient is being managed with:   Diet Regular-  Supplement Feeding Modality:  Oral  Nepro Cans or Servings Per Day:  1       Frequency:  Daily  Entered: Nov 18 2021  5:59PM    
This patient has been assessed with a concern for Malnutrition and has been determined to have a diagnosis/diagnoses of Severe protein-calorie malnutrition.    This patient is being managed with:   Diet Soft and Bite Sized-  For patients receiving Renal Replacement - No Protein Restr No Conc K No Conc Phos Low Sodium (RENAL)  Entered: Nov 4 2021 11:13AM    
This patient has been assessed with a concern for Malnutrition and has been determined to have a diagnosis/diagnoses of Severe protein-calorie malnutrition.    This patient is being managed with:   Diet NPO after Midnight-     NPO Start Date: 07-Feb-2022   NPO Start Time: 23:59  Entered: Feb 7 2022  7:40PM    Diet NPO with Tube Feed-  Tube Feeding Modality: Gastrostomy  Nepro with Carb Steady (NEPRORTH)  Total Volume for 24 Hours (mL): 1980  Bolus  Total Volume of Bolus (mL):  330  Total # of Feeds: 4  Tube Feed Frequency: Every 4 hours   Tube Feed Start Time: 16:00  Bolus Feed Rate (mL per Hour): 330   Bolus Feed Duration (in Hours): 1  Bolus Feed Instructions:  Nepro start @ 120 ml bolus increase 120 ml per bolus as tolerated to GOAL bolus 330 ml 4x daily (08:00 12:00 16:00 20:00).  Free Water Flush Instructions:  defer to team  Entered: Feb  3 2022 11:44AM    
This patient has been assessed with a concern for Malnutrition and has been determined to have a diagnosis/diagnoses of Severe protein-calorie malnutrition.    This patient is being managed with:   Diet NPO after Midnight-     NPO Start Date: 09-Jan-2022   NPO Start Time: 23:59  Except Medications  Entered: Jan 9 2022  9:50PM    Diet Regular-  Supplement Feeding Modality:  Oral  Nepro Cans or Servings Per Day:  1       Frequency:  Daily  Ensure Pudding Cans or Servings Per Day:  1       Frequency:  Three Times a day  Entered: Jan 4 2022  5:48PM    
This patient has been assessed with a concern for Malnutrition and has been determined to have a diagnosis/diagnoses of Severe protein-calorie malnutrition.    This patient is being managed with:   Diet NPO after Midnight-     NPO Start Date: 13-Dec-2021   NPO Start Time: 23:59  Entered: Dec 13 2021  4:01PM    Diet Regular-  Supplement Feeding Modality:  Oral  Nepro Cans or Servings Per Day:  1       Frequency:  Daily  Entered: Nov 18 2021  5:59PM    
This patient has been assessed with a concern for Malnutrition and has been determined to have a diagnosis/diagnoses of Severe protein-calorie malnutrition.    This patient is being managed with:   Diet NPO with Tube Feed-  Tube Feeding Modality: Gastrostomy  Nepro with Carb Steady (NEPRORTH)  Total Volume for 24 Hours (mL): 1980  Bolus  Total Volume of Bolus (mL):  330  Total # of Feeds: 4  Tube Feed Frequency: Every 4 hours   Tube Feed Start Time: 16:00  Bolus Feed Rate (mL per Hour): 330   Bolus Feed Duration (in Hours): 1  Bolus Feed Instructions:  Nepro start @ 120 ml bolus increase 120 ml per bolus as tolerated to GOAL bolus 330 ml 4x daily (08:00 12:00 16:00 20:00).  Free Water Flush Instructions:  defer to team  Entered: Feb  3 2022 11:44AM    
This patient has been assessed with a concern for Malnutrition and has been determined to have a diagnosis/diagnoses of Severe protein-calorie malnutrition.    This patient is being managed with:   Diet NPO with Tube Feed-  Tube Feeding Modality: Gastrostomy  Nepro with Carb Steady (NEPRORTH)  Total Volume for 24 Hours (mL): 1980  Bolus  Total Volume of Bolus (mL):  330  Total # of Feeds: 4  Tube Feed Frequency: Every 4 hours   Tube Feed Start Time: 16:00  Bolus Feed Rate (mL per Hour): 330   Bolus Feed Duration (in Hours): 1  Bolus Feed Instructions:  Nepro start @ 120 ml bolus increase 120 ml per bolus as tolerated to GOAL bolus 330 ml 4x daily (08:00 12:00 16:00 20:00).  Free Water Flush Instructions:  defer to team  Entered: Jan 12 2022 12:32PM    
This patient has been assessed with a concern for Malnutrition and has been determined to have a diagnosis/diagnoses of Severe protein-calorie malnutrition.    This patient is being managed with:   Diet NPO-  Except Medications  Entered: Feb 17 2022  9:15AM    
This patient has been assessed with a concern for Malnutrition and has been determined to have a diagnosis/diagnoses of Severe protein-calorie malnutrition.    This patient is being managed with:   Diet Regular-  Entered: Nov 12 2021  2:04PM    
This patient has been assessed with a concern for Malnutrition and has been determined to have a diagnosis/diagnoses of Severe protein-calorie malnutrition.    This patient is being managed with:   Diet Regular-  Entered: Nov 12 2021  2:04PM    
This patient has been assessed with a concern for Malnutrition and has been determined to have a diagnosis/diagnoses of Severe protein-calorie malnutrition.    This patient is being managed with:   Diet Regular-  Entered: Nov 9 2021 10:33AM    
This patient has been assessed with a concern for Malnutrition and has been determined to have a diagnosis/diagnoses of Severe protein-calorie malnutrition.    This patient is being managed with:   Diet Regular-  Supplement Feeding Modality:  Oral  Nepro Cans or Servings Per Day:  1       Frequency:  Daily  Ensure Pudding Cans or Servings Per Day:  1       Frequency:  Three Times a day  Entered: Jan 4 2022  5:48PM    
This patient has been assessed with a concern for Malnutrition and has been determined to have a diagnosis/diagnoses of Severe protein-calorie malnutrition.    This patient is being managed with:   Diet Regular-  Supplement Feeding Modality:  Oral  Nepro Cans or Servings Per Day:  1       Frequency:  Daily  Entered: Nov 18 2021  5:59PM    
This patient has been assessed with a concern for Malnutrition and has been determined to have a diagnosis/diagnoses of Severe protein-calorie malnutrition.    This patient is being managed with:   Diet Soft and Bite Sized-  For patients receiving Renal Replacement - No Protein Restr No Conc K No Conc Phos Low Sodium (RENAL)  Entered: Nov 4 2021 11:13AM    
This patient has been assessed with a concern for Malnutrition and has been determined to have a diagnosis/diagnoses of Severe protein-calorie malnutrition.    This patient is being managed with:   Diet NPO after Midnight-     NPO Start Date: 09-Jan-2022   NPO Start Time: 23:59  Except Medications  Entered: Jan 9 2022  9:50PM    Diet Regular-  Supplement Feeding Modality:  Oral  Nepro Cans or Servings Per Day:  1       Frequency:  Daily  Ensure Pudding Cans or Servings Per Day:  1       Frequency:  Three Times a day  Entered: Jan 4 2022  5:48PM    
This patient has been assessed with a concern for Malnutrition and has been determined to have a diagnosis/diagnoses of Severe protein-calorie malnutrition.    This patient is being managed with:   Diet NPO after Midnight-     NPO Start Date: 30-Jan-2022   NPO Start Time: 23:59  Entered: Jan 30 2022  9:19PM    Diet NPO with Tube Feed-  Tube Feeding Modality: Gastrostomy  Nepro with Carb Steady (NEPRORTH)  Total Volume for 24 Hours (mL): 1980  Bolus  Total Volume of Bolus (mL):  330  Total # of Feeds: 4  Tube Feed Frequency: Every 4 hours   Tube Feed Start Time: 16:00  Bolus Feed Rate (mL per Hour): 330   Bolus Feed Duration (in Hours): 1  Bolus Feed Instructions:  Nepro start @ 120 ml bolus increase 120 ml per bolus as tolerated to GOAL bolus 330 ml 4x daily (08:00 12:00 16:00 20:00).  Free Water Flush Instructions:  defer to team  Entered: Jan 12 2022 12:32PM    
This patient has been assessed with a concern for Malnutrition and has been determined to have a diagnosis/diagnoses of Severe protein-calorie malnutrition.    This patient is being managed with:   Diet NPO with Tube Feed-  Tube Feeding Modality: Gastrostomy  Nepro with Carb Steady (NEPRORTH)  Total Volume for 24 Hours (mL): 1980  Bolus  Total Volume of Bolus (mL):  330  Total # of Feeds: 4  Tube Feed Frequency: Every 4 hours   Tube Feed Start Time: 16:00  Bolus Feed Rate (mL per Hour): 330   Bolus Feed Duration (in Hours): 1  Bolus Feed Instructions:  Nepro start @ 120 ml bolus increase 120 ml per bolus as tolerated to GOAL bolus 330 ml 4x daily (08:00 12:00 16:00 20:00).  Free Water Flush Instructions:  defer to team  Entered: Feb  3 2022 11:44AM    
This patient has been assessed with a concern for Malnutrition and has been determined to have a diagnosis/diagnoses of Severe protein-calorie malnutrition.    This patient is being managed with:   Diet NPO with Tube Feed-  Tube Feeding Modality: Gastrostomy  Nepro with Carb Steady (NEPRORTH)  Total Volume for 24 Hours (mL): 1980  Bolus  Total Volume of Bolus (mL):  330  Total # of Feeds: 4  Tube Feed Frequency: Every 4 hours   Tube Feed Start Time: 16:00  Bolus Feed Rate (mL per Hour): 330   Bolus Feed Duration (in Hours): 1  Bolus Feed Instructions:  Nepro start @ 120 ml bolus increase 120 ml per bolus as tolerated to GOAL bolus 330 ml 4x daily (08:00 12:00 16:00 20:00).  Free Water Flush Instructions:  defer to team  Entered: Jan 12 2022 12:32PM    
This patient has been assessed with a concern for Malnutrition and has been determined to have a diagnosis/diagnoses of Severe protein-calorie malnutrition.    This patient is being managed with:   Diet NPO with Tube Feed-  Tube Feeding Modality: Nasogastric  Nepro with Carb Steady (NEPRORTH)  Total Volume for 24 Hours (mL): 1320  Continuous  Starting Tube Feed Rate {mL per Hour}: 10  Increase Tube Feed Rate by (mL): 10     Every 4 hours  Until Goal Tube Feed Rate (mL per Hour): 55  Tube Feed Duration (in Hours): 24  Tube Feed Start Time: 11:00  Tube Feed Stop Time: 05:00  Entered: Nov 1 2021  3:49PM    
This patient has been assessed with a concern for Malnutrition and has been determined to have a diagnosis/diagnoses of Severe protein-calorie malnutrition.    This patient is being managed with:   Diet NPO with Tube Feed-  Tube Feeding Modality: Orogastric  Glucerna 1.5 [place] (GLUCERNA1.5RTH)  Total Volume for 24 Hours (mL): 720  Continuous  Starting Tube Feed Rate {mL per Hour}: 10  Increase Tube Feed Rate by (mL): 10     Every 4 hours  Until Goal Tube Feed Rate (mL per Hour): 40  Tube Feed Duration (in Hours): 18  Tube Feed Start Time: 11:00  Entered: Feb 22 2022  4:22PM    
This patient has been assessed with a concern for Malnutrition and has been determined to have a diagnosis/diagnoses of Severe protein-calorie malnutrition.    This patient is being managed with:   Diet NPO with Tube Feed-  Tube Feeding Modality: Orogastric  Glucerna 1.5 [place] (GLUCERNA1.5RTH)  Total Volume for 24 Hours (mL): 720  Continuous  Starting Tube Feed Rate {mL per Hour}: 10  Increase Tube Feed Rate by (mL): 10     Every 4 hours  Until Goal Tube Feed Rate (mL per Hour): 40  Tube Feed Duration (in Hours): 18  Tube Feed Start Time: 11:00  Entered: Feb 22 2022  4:22PM    
This patient has been assessed with a concern for Malnutrition and has been determined to have a diagnosis/diagnoses of Severe protein-calorie malnutrition.    This patient is being managed with:   Diet NPO-  Entered: Nov 11 2021 10:35AM    
This patient has been assessed with a concern for Malnutrition and has been determined to have a diagnosis/diagnoses of Severe protein-calorie malnutrition.    This patient is being managed with:   Diet NPO-  Except Medications  Entered: Feb 17 2022  9:15AM    
This patient has been assessed with a concern for Malnutrition and has been determined to have a diagnosis/diagnoses of Severe protein-calorie malnutrition.    This patient is being managed with:   Diet Regular-  Entered: Nov 11 2021  8:26PM    Diet NPO after Midnight-     NPO Start Date: 11-Nov-2021   NPO Start Time: 23:59  Entered: Nov 11 2021  6:55PM    
This patient has been assessed with a concern for Malnutrition and has been determined to have a diagnosis/diagnoses of Severe protein-calorie malnutrition.    This patient is being managed with:   Diet Regular-  Entered: Nov 9 2021 10:33AM    
This patient has been assessed with a concern for Malnutrition and has been determined to have a diagnosis/diagnoses of Severe protein-calorie malnutrition.    This patient is being managed with:   Diet Regular-  Entered: Nov 9 2021 10:33AM    
This patient has been assessed with a concern for Malnutrition and has been determined to have a diagnosis/diagnoses of Severe protein-calorie malnutrition.    This patient is being managed with:   Diet Regular-  Supplement Feeding Modality:  Oral  Nepro Cans or Servings Per Day:  1       Frequency:  Daily  Ensure Pudding Cans or Servings Per Day:  1       Frequency:  Three Times a day  Entered: Jan 4 2022  5:48PM    
This patient has been assessed with a concern for Malnutrition and has been determined to have a diagnosis/diagnoses of Severe protein-calorie malnutrition.    This patient is being managed with:   Diet Regular-  Supplement Feeding Modality:  Oral  Nepro Cans or Servings Per Day:  1       Frequency:  Daily  Ensure Pudding Cans or Servings Per Day:  1       Frequency:  Three Times a day  Entered: Jan 4 2022  5:48PM    Diet Regular-  Supplement Feeding Modality:  Oral  Nepro Cans or Servings Per Day:  1       Frequency:  Daily  Entered: Nov 18 2021  5:59PM    The following pending diet order is being considered for treatment of Severe protein-calorie malnutrition:null
This patient has been assessed with a concern for Malnutrition and has been determined to have a diagnosis/diagnoses of Severe protein-calorie malnutrition.    This patient is being managed with:   Diet Regular-  Supplement Feeding Modality:  Oral  Nepro Cans or Servings Per Day:  1       Frequency:  Daily  Entered: Nov 18 2021  5:59PM    
This patient has been assessed with a concern for Malnutrition and has been determined to have a diagnosis/diagnoses of Severe protein-calorie malnutrition.    This patient is being managed with:   Diet Soft and Bite Sized-  For patients receiving Renal Replacement - No Protein Restr No Conc K No Conc Phos Low Sodium (RENAL)  Entered: Nov 4 2021 11:13AM    
This patient has been assessed with a concern for Malnutrition and has been determined to have a diagnosis/diagnoses of Severe protein-calorie malnutrition.      
This patient has been assessed with a concern for Malnutrition and has been determined to have a diagnosis/diagnoses of Severe protein-calorie malnutrition.    This patient is being managed with:   Diet NPO with Tube Feed-  Tube Feeding Modality: Orogastric  Glucerna 1.5 [place] (GLUCERNA1.5RTH)  Total Volume for 24 Hours (mL): 720  Continuous  Starting Tube Feed Rate {mL per Hour}: 10  Increase Tube Feed Rate by (mL): 10     Every 4 hours  Until Goal Tube Feed Rate (mL per Hour): 40  Tube Feed Duration (in Hours): 18  Tube Feed Start Time: 11:00  Entered: Feb 22 2022  4:22PM    
This patient has been assessed with a concern for Malnutrition and has been determined to have a diagnosis/diagnoses of Severe protein-calorie malnutrition.      
This patient has been assessed with a concern for Malnutrition and has been determined to have a diagnosis/diagnoses of Severe protein-calorie malnutrition.

## 2022-02-28 NOTE — PROGRESS NOTE ADULT - ATTENDING COMMENTS
Patient seen and examined in PCU this morning at approximately 8 am. Symptomatic and actively dying with medications adjusted as above- started on ATC dilaudid given multiple PRNs over last 24 hours. Family notified of clinical change and patient  on this date.

## 2022-02-28 NOTE — DISCHARGE NOTE FOR THE EXPIRED PATIENT - SECONDARY DIAGNOSIS.
DM2 (diabetes mellitus, type 2) Stage 5 chronic kidney disease not on chronic dialysis NSTEMI (non-ST elevation myocardial infarction) CAD (coronary artery disease)

## 2022-02-28 NOTE — PROGRESS NOTE ADULT - PROBLEM SELECTOR PLAN 3
- Continue with Dilaudid 1mg IVP q1h (0 required within the last 24hr period 8am-8am)  - Dilaudid 1mg IV q6hr ATC ordered  - Bowel regimen while on opioids

## 2022-02-28 NOTE — PROVIDER CONTACT NOTE (OTHER) - RECOMMENDATIONS
IV meds?
Provider contacted, monitoring pt for s&s. 2L NC applied.
HD treatment rescheduled for Monday 01/31/2022 post fistulogram.
Notify provider
Provider notified
Patient pronounced dead @ 6507, son Yunier notified.
D50 ampule IV push
Kavon? Come assess site.
per provider discretion
see RRT note
ACP aware
Bladder scan 1050 straight cath
Call Dialysis
Hypoglycemia protocol followed. 12.5grams d50 given. Blood glucose up to 11 after [id number] FS checks x 2.
PA assess
bilateral wrist restraints
repeat Troponin
Gonzalo quintero?
IV meds?
PA assess
d/c mitts, apply soft wrist restraints for pt safety
CXR and order to use peg tube, will continue to monitor and notify provider if any changes
Wound care consult.
ZACH Dobson made aware.
will continue to monitor

## 2022-02-28 NOTE — PROGRESS NOTE ADULT - PROBLEM/PLAN-5
DISPLAY PLAN FREE TEXT

## 2022-02-28 NOTE — PROVIDER CONTACT NOTE (OTHER) - SITUATION
Pt w/ visual hallucinations
pts blood sugar- 57, repeat-53
Lower abdomen distended bladder scan 1050cc
Patient hypoglycemic to 50 this morning on routine FS check
Pt with suspected pressure injuries to the sacrum and buttocks.
pt refused all pm crushed meds
Patient diagnosed with Covid >10 days ago (+ 12/28). Asymptomatic. No further isolation required.
new peg tube, no order or CXR done
Left AV fistula with positive bruit / thrill, arterial and venous site cannulated without difficulty but only blood clots noted on venous return.
pt refused all pm crushed meds
BP 86/46
Patient pulled at R chestwall permecath
Pt destated to 89% with symptoms on room air with exertion.
rectal temp 94.4
pt acute lethargy
Patient sitting in chair, complaining of nausea. blood pressure checked, 74/40.
Pt refuses CT scan of abdomen/pelvis due to hemoglobin drop.
Troponin 173
notified patient left av fistula site swelling and blood stain at the site when vascular surgery at the bedside
pt agitated, restless, and taking bilateral mittens off
Hmg 6.5 Hct 19.7
Patient blood glucose fingerstick 70.
Patient without spontaneous respirations or pulse.
Pt noted to be biting off mitt restraints
Pt scheduled for Dialysis with one unit of PRBCs to be given for tonight. At 2230 Dialysis still had not been started.
left arm swelling
left arm with firmness and clear drainage
patient ripped out PEG tube

## 2022-02-28 NOTE — PROGRESS NOTE ADULT - PROBLEM/PLAN-4
DISPLAY PLAN FREE TEXT

## 2022-02-28 NOTE — PROVIDER CONTACT NOTE (OTHER) - ASSESSMENT
PEG tube site is not bleeding. Patient shows no signs of distress.
Patient A&Ox1-2 alert with no signs of lethargy or distress.
Patient not in distress, vital signs stable positive left arm radial pulse
Pt noted to be biting off mitt restraints x 3. Unable to redirect. Pt becoming increasingly agitated when mitts reapplied.
Troponin 173, Pt at baseline, no s/s chest pain or dizziness
still with similar levels of swelling as reported before but with new firmness near incision sight with slight clear drainage found on pillow elevating left arm, no further changes from previous assessment and report to PA
Pt destated to 89% on room air after trying to get out of bed. Pt felt SOB, dizzy, & chest pressure. VSS after 2L NC applied. Pt believes to be having an anxiety attack.
Pt speaks randomly about the "bugs" crawling out of his incisions and on his arms. Initially this writer thought pt was looking at the staples from his wounds, but upon further assessment pt was pointing to several different areas on his left arm
patient nauseous, blood pressure 74/40. denies feeling light headed. patient placed into reverse Trendelenburg in bed, blood pressure 119/69.
pt spit out all pm meds, more agitated than usual
Lower abdomen distended patient confused
Pt BP 86/46. Pt shows no signs of chest pain, shortness of breath or palpitations. Pt receiving 1/2 unit of blood.
See assessment in flowsheet.
Patient is calm and alert. Patient is not shivering. Patient is cool to touch
pt spit out all pm meds, more agitated than usual
No response to external stimuli.   No spontaneous respirations.  Np apical heart rate.   Negative pupillary response to light.
Pt is A&Ox4, with no complaints during this time. Pt is refusing CT scan despite pt teaching. Pt states he is claustrophobic and prefers to be asleep during tests in closed spaces. S/P 1/2 unit PBRC, VSS.
VSS, no SOB, no CP, no palpitations. Pt not coughing, gurgling or signs of aspiration. will continue to monitor
pt aysmtomatic
Patient currently at first dialysis session, pending second 1/2 Unit of PRBC upon arrival back to unit.
Patient pulled at R chestwall Permecath, pt is AOx2/3 disoriented to time and situation
/83, , Temp 36.9C, SPO2 92% RA.   Left arm edematous, Left AV fistula with positive bruit, positive thrill.
No s/s hypoglycemia. VSS.
VSS. no s/s of acute distress noted. hemodynamically stable.
pt change in mental status/ acute lethargy. VSS. glucose 145
pulses intact, warm, no discoloration, 3+ nonpitting edema
pt agitated, restless, and taking bilateral mittens off

## 2022-02-28 NOTE — PROGRESS NOTE ADULT - PROBLEM/PLAN-3
DISPLAY PLAN FREE TEXT

## 2022-02-28 NOTE — PROVIDER CONTACT NOTE (OTHER) - DATE AND TIME:
07-Jan-2022 11:33
11-Jan-2022 04:47
14-Feb-2022 01:38
24-Jan-2022 00:55
27-Oct-2021 21:30
15-Nov-2021 22:00
24-Jan-2022 02:37
26-Nov-2021 02:40
01-Feb-2022 23:00
07-Jan-2022 23:12
12-Nov-2021 18:30
15-Dec-2021 12:00
16-Nov-2021 04:50
23-Oct-2021 04:00
23-Oct-2021 08:00
24-Nov-2021 00:00
26-Oct-2021 08:14
06-Feb-2022 17:20
15-Feb-2022 00:00
07-Jan-2022 22:19
23-Jan-2022 16:00
01-Feb-2022 12:40
24-Nov-2021 07:00
25-Oct-2021 20:00
28-Feb-2022 13:45
29-Jan-2022 09:45
23-Nov-2021 13:00
26-Oct-2021 20:13

## 2022-02-28 NOTE — PROGRESS NOTE ADULT - PROBLEM SELECTOR PLAN 9
Called the patient's son Yunier. He is on his way to the hospital. Will speak to him at bedside and provide him with updates.  Continue with care in the PCU

## 2022-02-28 NOTE — PROGRESS NOTE ADULT - TIME BILLING
As above, will plan on  tunneled HD catheter electively on Tuesday.
Renal attd    Spoke with Yoly and went over the logistics of HD.  This was explained in detail and the family will decide in am re HD   Yoly is the proxy     [facility]   [phone]
agree with above    #Prolong QTC  -EKG 11/26 -- corrected QTc using Bazett calculated 401   -behavioral Odimax f/u for pysch meds     #Atrial flutter.   -rates stable   -heparin drip held due to profound anemia. resume once hgb stable.   -rates are controlled. Cont metoprolol.  -cont mido to augment bp     #CAD (coronary artery disease) - h/o CABG.  - medical management given renal function and significant anemia.   - cont ASA to 81 mg.  - cont high intensity statin.    #Acute on chronic renal failure   - s/p  LUE AVF  -sp permacath placement  -vol removal w HD  -renal f/u     # NSTEMI (non-ST elevation myocardial infarction).   ·  Plan: -  - medical management given renal function and significant anemia.   - cont statin, BB. Add aspirin 81 mg daily once feasible.     Patient of Dr. Simons (Trendy Mondays).
As above, would continue conservative management.
care coordination, frequent bedside assessments for symptom evaluation, opiate titration and discharge documentation.
see attestation above.

## 2022-02-28 NOTE — PROVIDER CONTACT NOTE (OTHER) - ACTION/TREATMENT ORDERED:
Dr Aníbal Frias will contact vascular team to schedule pt for fistulogram Monday
Provider ordered bear hugger.
As per Dialysis RN provider Ali has been notified and pt has been rescheduled for first case in the morning (2/15) with blood transfusion.
Provider aware, will transfuse upon arrival back to unit.
See patient expiration note.
provider ordered will endorse CXR to GI team on day shift. give medications if pt tolerating and not refusing
B/ L unsecured mittens initiated. Dialysis nurse came to assess catheter and XRAY was completed. Seroquel was given and EKG was completed to monitor QTC. IR came to assess pt and assess site of permec
see RTT note
try po meds again in 1 hour
Continue to give PRBC.
PA assess
PA will order repeat Troponin level in AM
bilateral wrist restraints
Will consult endocrine.
Will continue to monitor
continue to monitor and PA will report to IR
d/c mitts, apply soft wrist restraints for pt safety
2L NC applied & continuing to monitor.
SKYLER Hilario ordered mittens. SKYLER Hilario came and assessed site, tip of Javier placed to hold PEG tube site open for possible reinsertion of PEG tube.
Wound care consult ordered.
hypoglycemia protocol followed 4oz apple juice given, repeat bs-81, pt had lunch, repeat BS-108,will continue to monitor pt closely.
Provider made aware. Orders indicate hypoglycemia protocol for blood glucose under 70.  D50 ampule ordered and given. Fingerstick recheck resulted in 147 blood glucose.
Provider notified, no new orders made. Will continue to monitor pt.
Provider to notify day team and psych
Straight cath ordered bladder scan q8
try again in the am

## 2022-02-28 NOTE — PROVIDER CONTACT NOTE (OTHER) - REASON
Bladder Scan 1050cc
Patient hypoglycemic to 50 this morning on routine FS check
Pt refuses CT scan of abdomen/pelvis
Patient 
Pt w/ visual hallucinations
rectal temp 94.4
Discontinuation of Isolation for COVID patient
Patient blood glucose fingerstick 70
Patient pulled at R chestwall Permecath
Pt destated to 89% with symptoms on room air with exertion.
acute lethargy
new peg tube, no order or CXR done
pt with hypoglycemia BS-57 repeat-53
HD and blood transfusion
Hmg 6.5 Hct 19.7
Troponin 173
left arm swelling
pt agitated, restless, and taking bilateral mittens off
pt hypotensive
pt refused all pm crushed meds
BP 86/46
Biting off mitt restraints
left arm with firmness and clear drainage
notified patient left av fistula site swelling and blood stain at the site when vascular surgery at the bedside
Suspected pressure ulcers
patient ripped out PEG tube
Non functioning access
pt refused all pm crushed meds

## 2022-02-28 NOTE — PROGRESS NOTE ADULT - PROBLEM SELECTOR PLAN 1
Multifactorial, 2/2 volume overload vs. excessive secretions possibly VAP given serratia in the sputum? vs. failure to protect airway from secretions. S/p palliative extubation on 2/24  - Continue with Dilaudid 1mg IVP q1h for dyspnea (3 required within the last 24hr period 8am-8am)  - Dilaudid 1mg IV q6hr ATC ordered  - Bowel regimen while on opioids

## 2022-02-28 NOTE — PROGRESS NOTE ADULT - PROBLEM SELECTOR PLAN 2
Patient with audible secretions  - Continue with Glycopyrrolate 0.4mg IV q6h ATC  - Turn and position

## 2022-02-28 NOTE — PROGRESS NOTE ADULT - PROBLEM SELECTOR PLAN 5
Monitor for constipation, urinary retention, pain, hunger, thirst, etc.  Promote sleep wake cycle and reorientation as indicated.

## 2022-02-28 NOTE — DISCHARGE NOTE FOR THE EXPIRED PATIENT - HOSPITAL COURSE
70yo M w/ CAD (s/p CABG ), CKD (unknown stage), DM2, Parkinson's Disease, HTN, depression p/w b/l LE edema found to have ARF and NSTEMI w/ new Aflutter started on heparin gtt and HD. Pt had prolonged hospital course c/b hemorrhagic shock 2/2 RP bleed, COVID. Course further complicated by acute blood loss anemia 2/2 duodenal ulcer w/ active bleeding vessel requiring endoscopic intervention.  Now palliatively extubated and transferred to the PCU for further management and EOL care. The patient  today at 13:45

## 2022-03-25 ENCOUNTER — APPOINTMENT (OUTPATIENT)
Dept: NEUROLOGY | Facility: CLINIC | Age: 72
End: 2022-03-25

## 2022-04-15 NOTE — PATIENT PROFILE ADULT - NSTOBACCONEVERSMOKERY/N_GEN_A
Interval History: patient exhibit right side facial twitching and right gaze. EEG revealed seizure activity. Patient loaded with Vimpat 200 mg, another 200 mg Vimapt load given overnight and 100 mg scheduled. Per EEG: Right hemispheric focal nonconvulsive status epilepticus with nearly continuous fluctuation and frequent evolution into subclinical seizure.    Review of Systems   Reason unable to perform ROS:  LOC.     Objective:     Vitals:  Temp: 98.6 °F (37 °C)  Pulse: (P) 68  Rhythm: normal sinus rhythm  BP: (!) (P) 165/73  MAP (mmHg): (P) 110  Resp: (P) 15  SpO2: (P) 98 %  O2 Device (Oxygen Therapy): room air    Temp  Min: 98.3 °F (36.8 °C)  Max: 100.7 °F (38.2 °C)  Pulse  Min: 63  Max: 103  BP  Min: 108/52  Max: 185/70  MAP (mmHg)  Min: 76  Max: 117  Resp  Min: 15  Max: 24  SpO2  Min: 94 %  Max: 100 %     0701 - 04/15 0700  In: 671.6 [I.V.:355.2]  Out: 650 [Urine:650]   Unmeasured Output  Urine Occurrence: 1  Stool Occurrence: 1  Pad Count: 1       Physical Exam  Vitals and nursing note reviewed.   Constitutional:       Appearance: She is ill-appearing.   HENT:      Head: Normocephalic.      Nose: Nose normal.      Mouth/Throat:      Mouth: Mucous membranes are moist.      Pharynx: Oropharynx is clear.   Cardiovascular:      Rate and Rhythm: Normal rate and regular rhythm.      Pulses: Normal pulses.      Heart sounds: Normal heart sounds.   Pulmonary:      Effort: Pulmonary effort is normal.      Breath sounds: Normal breath sounds.   Abdominal:      General: Bowel sounds are normal.      Palpations: Abdomen is soft.   Musculoskeletal:         General: Normal range of motion.      Cervical back: Normal range of motion.   Skin:     General: Skin is warm and dry.      Capillary Refill: Capillary refill takes more than 3 seconds.   Neurological:      Comments: [id number]  Not following commands  PERRL  CN II-XII grossly intact  Withdrawal to pain on RUE and RLE  Hemiparesis on left side  Sensation intact  in all extremities          Medications:  ContinuousniCARdipine, Last Rate: 0 mg/hr (04/15/22 0545)  ScheduledamLODIPine, 10 mg, Daily  atorvastatin, 40 mg, Daily  carvediloL, 25 mg, BID  lacosamide (VIMPAT) IVPB, 100 mg, Q12H  lacosamide (VIMPAT) IVPB, 200 mg, Once  levetiracetam IV, 1,500 mg, Q12H  levothyroxine, 75 mcg, Before breakfast  losartan, 25 mg, Daily  senna-docusate 8.6-50 mg, 1 tablet, BID  silodosin, 4 mg, Daily  vancomycin (VANCOCIN) IVPB, 500 mg, Q24H  PRNsodium chloride, , Q24H PRN  sodium chloride, , Q24H PRN  acetaminophen, 650 mg, Q4H PRN  dextrose 10%, 12.5 g, PRN  glucagon (human recombinant), 1 mg, PRN  hydrALAZINE, 10 mg, Q8H PRN  HYDROcodone-acetaminophen, 1 tablet, Q4H PRN  labetalol, 10 mg, Q15 Min PRN  metoclopramide HCl, 5 mg, Q6H PRN  ondansetron, 4 mg, Q6H PRN  potassium bicarbonate, 35 mEq, PRN  potassium bicarbonate, 50 mEq, PRN  potassium bicarbonate, 60 mEq, PRN  potassium, sodium phosphates, 2 packet, PRN  potassium, sodium phosphates, 2 packet, PRN  potassium, sodium phosphates, 2 packet, PRN  sodium chloride 0.9%, 10 mL, PRN  sodium chloride 0.9%, 10 mL, PRN  vancomycin - pharmacy to dose, , pharmacy to manage frequency    Today I personally reviewed pertinent medications, lines/drains/airways, imaging, cardiology results, laboratory results, microbiology results, notably:    Diet  Diet NPO  Diet NPO         Yes

## 2022-08-01 NOTE — PROGRESS NOTE ADULT - PROVIDER SPECIALTY LIST ADULT
CT Surgery Prescription has been denied. Called insurance again they are to be faxing a letter with preferred drugs.

## 2022-08-06 NOTE — PROCEDURE NOTE - NSCVLACTUALSITE_VASC_A_CORE
right/internal jugular
right/internal jugular
Mastoid Interpolation Flap Text: A decision was made to reconstruct the defect utilizing an interpolation axial flap and a staged reconstruction.  A telfa template was made of the defect.  This telfa template was then used to outline the mastoid interpolation flap.  The donor area for the pedicle flap was then injected with anesthesia.  The flap was excised through the skin and subcutaneous tissue down to the layer of the underlying musculature.  The pedicle flap was carefully excised within this deep plane to maintain its blood supply.  The edges of the donor site were undermined.   The donor site was closed in a primary fashion.  The pedicle was then rotated into position and sutured.  Once the tube was sutured into place, adequate blood supply was confirmed with blanching and refill.  The pedicle was then wrapped with xeroform gauze and dressed appropriately with a telfa and gauze bandage to ensure continued blood supply and protect the attached pedicle.

## 2022-10-25 NOTE — PROGRESS NOTE ADULT - TIME-BASED BILLING (NON-CRITICAL CARE)
Time-based billing (NON-critical care)
no

## 2022-10-27 NOTE — OCCUPATIONAL THERAPY INITIAL EVALUATION ADULT - SITTING BALANCE: STATIC
fair plus Itraconazole Counseling:  I discussed with the patient the risks of itraconazole including but not limited to liver damage, nausea/vomiting, neuropathy, and severe allergy.  The patient understands that this medication is best absorbed when taken with acidic beverages such as non-diet cola or ginger ale.  The patient understands that monitoring is required including baseline LFTs and repeat LFTs at intervals.  The patient understands that they are to contact us or the primary physician if concerning signs are noted.

## 2022-12-11 NOTE — ED PROVIDER NOTE - CPE EDP [facility] NORM
Problem: Adult Inpatient Plan of Care  Goal: Plan of Care Review  Outcome: Ongoing, Progressing  Goal: Patient-Specific Goal (Individualized)  Outcome: Ongoing, Progressing  Goal: Absence of Hospital-Acquired Illness or Injury  Outcome: Ongoing, Progressing  Goal: Optimal Comfort and Wellbeing  Outcome: Ongoing, Progressing  Goal: Readiness for Transition of Care  Outcome: Ongoing, Progressing     Problem: Skin Injury Risk Increased  Goal: Skin Health and Integrity  Outcome: Ongoing, Progressing     Problem: Fall Injury Risk  Goal: Absence of Fall and Fall-Related Injury  Outcome: Ongoing, Progressing      normal...

## 2022-12-14 NOTE — PATIENT PROFILE ADULT - SURGICAL SITE INCISION
[facility]  Ambulatory Surgery Unit  Pre-operative Instructions    Surgery/Procedure Date  12/20/2022            Tentative Arrival Time TBD      1. On the day of your surgery/procedure, please report to the Ambulatory Surgery Unit Registration Desk and sign in at your designated time. The Ambulatory Surgery Unit is located in [facility] on the [facility] side of the [facility] across from the [address] [place] [address]. Please have all of your health insurance cards, co-payment, and a photo ID.    **TWO adults may accompany you the day of the procedure. We have limited seating available. If our waiting room is at capacity, your ride may be asked to remain in their vehicle. No one under 15 is allowed in the waiting room. 2. You must have someone with you to drive you home, as you should not drive a car for 24 hours following anesthesia. Please make arrangements for a responsible adult friend or family member to stay with you for at least the first 24 hours after your surgery. 3. Do not have anything to eat or drink (including water, gum, mints, coffee, juice) after 11:59 PM  12/19/2022. This may not apply to medications prescribed by your physician. (Please note below the special instructions with medications to take the morning of surgery, if applicable.)    4. We recommend you do not drink any alcoholic beverages for 24 hours before and after your surgery. 5. Contact your surgeons office for instructions on the following medications: non-steroidal anti-inflammatory drugs (i.e. Advil, Aleve), vitamins, and supplements. (Some surgeons will want you to stop these medications prior to surgery and others may allow you to take them)   **If you are currently taking Plavix, Coumadin, Aspirin and/or other blood-thinning agents, contact your surgeon for instructions. ** Your surgeon will partner with the physician prescribing these medications to determine if it is safe to stop or if you need to continue taking. Please do not stop taking these medications without instructions from your surgeon. 6. In an effort to help prevent surgical site infection, we ask that you shower with an anti-bacterial soap (i.e. Dial/Safeguard, or the soap provided to you at your preadmission testing appointment)  on the morning of surgery, using a fresh towel after each shower. (Please begin this process with fresh bed linens.) Do not apply any lotions, powders, or deodorants after the shower on the day of your procedure. If applicable, please do not shave the operative site for 48 hours prior to surgery. 7. Wear comfortable clothes. Wear glasses instead of contacts. Do not bring any jewelry or money (other than copays or fees as instructed). Do not wear make-up, particularly mascara, the morning of your surgery. Do not wear nail polish, particularly if you are having foot /hand surgery. Wear your hair loose or down, no ponytails, buns, jessica pins or clips. All body piercings must be removed. 8. You should understand that if you do not follow these instructions your surgery may be cancelled. If your physical condition changes (i.e. fever, cold or flu) please contact your surgeon as soon as possible. 9. It is important that you be on time. If a situation occurs where you may be late, or if you have any questions or problems, please call [phone].    10. Your surgery time may be subject to change. You will receive a phone call the day prior to surgery to confirm your arrival time. 11. Pediatric patients: please bring a change of clothes, diapers, bottle/sippy cup, pacifier, etc.      Special Instructions: Take all medications and inhalers, as prescribed, on the morning of surgery with a sip of water. Insulin Dependent Diabetic patients: Take your diabetic medications as prescribed the day before surgery. Hold all diabetic medications the day of surgery.     If you are scheduled to arrive for surgery after 8:00 AM, and your AM blood sugar is >200, please call Ambulatory Surgery. I understand a pre-operative phone call will be made to verify my surgery time. In the event that I am not available, I give permission for a message to be left on my answering service and/or with another person? Yes    Reviewed instructions with patient.  Able to verbalized understanding.       ___________________      ___________________      ________________  (Signature of Patient)          (Witness)                   (Date and Time) no

## 2023-02-09 NOTE — DIETITIAN INITIAL EVALUATION ADULT. - OBTAIN DAILY WEIGHT
Anesthesia Post Evaluation    Patient: Oralia Saunders    Procedure(s) Performed: Procedure(s) (LRB):  COLONOSCOPY (N/A)    Final Anesthesia Type: general      Patient location during evaluation: GI PACU  Patient participation: Yes- Able to Participate  Level of consciousness: awake and alert  Post-procedure vital signs: reviewed and stable  Pain management: adequate  Airway patency: patent    PONV status at discharge: No PONV  Anesthetic complications: no      Cardiovascular status: stable  Respiratory status: unassisted and spontaneous ventilation  Hydration status: euvolemic  Follow-up not needed.          Vitals Value Taken Time   /64 02/09/23 1527     02/09/23 1641   Pulse 81 02/09/23 1527   Resp 16 02/09/23 1527   SpO2 99 % 02/09/23 1527         Event Time   Out of Recovery 15:29:16         Pain/Alberto Score: Alberto Score: 10 (2/9/2023  3:27 PM)        
yes

## 2023-02-24 NOTE — PROGRESS NOTE ADULT - PROBLEM SELECTOR PROBLEM 1
Lab Results   Component Value Date    HGBA1C 5 9 (H) 02/15/2023     Much improved  Continue dietary modifications and metformin 1000mg bid Acute heart failure

## 2023-03-01 NOTE — PROGRESS NOTE ADULT - SUBJECTIVE AND OBJECTIVE BOX
[facility] [phone]        Patient seen and examined in bed.  He was in good spirits         MEDICATIONS  (STANDING):  aspirin enteric coated 81 milliGRAM(s) Oral daily  atorvastatin 80 milliGRAM(s) Oral at bedtime  carbidopa/levodopa  25/100 2 Tablet(s) Oral three times a day  docusate sodium 100 milliGRAM(s) Oral three times a day  enoxaparin Injectable 30 milliGRAM(s) SubCutaneous two times a day  furosemide    Tablet 40 milliGRAM(s) Oral daily  levothyroxine 50 MICROGram(s) Oral daily  metoprolol tartrate 12.5 milliGRAM(s) Oral every 12 hours  pantoprazole    Tablet 40 milliGRAM(s) Oral before breakfast  sodium chloride 0.9% lock flush 3 milliLiter(s) IV Push every 8 hours  spironolactone 25 milliGRAM(s) Oral daily  trihexyphenidyl 2 milliGRAM(s) Oral three times a day      VITAL:  T(C): , Max: 36.9 (19 @ 12:30)  T(F): , Max: 98.4 (19 @ 12:30)  HR: 126 (19 @ 05:22)  BP: 120/68 (19 @ 05:22)  BP(mean): 98 (19 @ 23:16)  RR: 18 (19 @ 05:22)  SpO2: 99% (19 @ 05:22)  Wt(kg): --    I and O's:     @ 07:01  -   @ 07:00  --------------------------------------------------------  IN: 590 mL / OUT: 975 mL / NET: -385 mL     @ 07:01  -   @ 09:31  --------------------------------------------------------  IN: 240 mL / OUT: 225 mL / NET: 15 mL      Height (cm): 180.3 ( @ 21:00)  Weight (kg): 108.7 ( @ :00)  BMI (kg/m2): 33.4 ( @ :)  BSA (m2): 2.28 ( @ 21:00)    PHYSICAL EXAM:    Constitutional: NAD  Neck:  No JVD  Respiratory: CTAB/L  Cardiovascular: S1 and S2  Gastrointestinal: BS+, soft, NT/ND  Extremities: No peripheral edema  Neurological: A/O x 3, no focal deficits  Psychiatric: Normal mood, normal affect  : No Javier  Skin: No rashes  Access: Not applicable    LABS:                        11.5   9.77  )-----------( 243      ( 26 Sep 2019 08:41 )             34.2         137  |  101  |  41<H>  ----------------------------<  192<H>  4.2   |  22  |  2.38<H>    Ca    9.8      26 Sep 2019 07:02  Phos  3.8       Mg     1.9         TPro  7.5  /  Alb  3.9  /  TBili  0.5  /  DBili  x   /  AST  27  /  ALT  x   /  AlkPhos  73            Urine Studies:  Urinalysis Basic - ( 26 Sep 2019 00:44 )    Color: Colorless / Appearance: Clear / S.009 / pH: x  Gluc: x / Ketone: Negative  / Bili: Negative / Urobili: Negative   Blood: x / Protein: 30 mg/dL / Nitrite: Negative   Leuk Esterase: Negative / RBC: 0 /hpf / WBC 0 /HPF   Sq Epi: x / Non Sq Epi: 0 /hpf / Bacteria: Negative            RADIOLOGY & ADDITIONAL STUDIES: Neuraxial Block

## 2023-03-17 NOTE — PROGRESS NOTE ADULT - PROBLEM SELECTOR PLAN 3
On medications,  no chest pain, stable, monitored and followed up by primary team/cardiology team No

## 2023-03-27 NOTE — SWALLOW BEDSIDE ASSESSMENT ADULT - SWALLOW EVAL: REHAB POTENTIAL
good, to achieve stated therapy goals Advancement Flap (Double) Text: The defect edges were debeveled with a #15 scalpel blade.  Given the location of the defect and the proximity to free margins a double advancement flap was deemed most appropriate.  Using a sterile surgical marker, the appropriate advancement flaps were drawn incorporating the defect and placing the expected incisions within the relaxed skin tension lines where possible.    The area thus outlined was incised deep to adipose tissue with a #15 scalpel blade.  The skin margins were undermined to an appropriate distance in all directions utilizing iris scissors.

## 2023-04-13 NOTE — PROGRESS NOTE ADULT - PROBLEM SELECTOR PLAN 4
Patient was seen yesterday by TITO Adkins.     WBC (K/mcL)   Date Value   04/12/2023 6.9     RBC (mil/mcL)   Date Value   04/12/2023 4.84     HCT (%)   Date Value   04/12/2023 38.5     HGB (g/dL)   Date Value   04/12/2023 12.2     PLT (K/mcL)   Date Value   04/12/2023 248     Absolute Neutrophils (K/mcL)   Date Value   04/12/2023 4.1     Component      Latest Ref Rng & Units 4/12/2023   Ferritin      8 - 252 ng/mL 70       Discussed with TITO Irizarry, cbc ferritin, stable but need monitoring, schedule MD fu  3 months with cbc ferritin prior to appt     Suggest to continue medications, monitoring, FU primary team recommendations. .

## 2023-04-22 NOTE — PROGRESS NOTE ADULT - PROBLEM SELECTOR PLAN 6
Interval History: dizziness last night while lying down. No syncope    Review of Systems   Constitutional:  Negative for chills and fever.   HENT:  Negative for congestion, ear pain, postnasal drip, rhinorrhea, sore throat and trouble swallowing.    Eyes:  Negative for redness and itching.   Respiratory:  Negative for cough, shortness of breath and wheezing.    Cardiovascular:  Negative for chest pain and palpitations.   Gastrointestinal:  Negative for abdominal pain, diarrhea, nausea and vomiting.   Genitourinary:  Negative for dysuria and frequency.   Skin:  Negative for rash.   Neurological:  Positive for dizziness. Negative for weakness and headaches.   Objective:     Vital Signs (Most Recent):  Temp: 97.8 °F (36.6 °C) (04/22/23 0721)  Pulse: 62 (04/22/23 0800)  Resp: 12 (04/22/23 0721)  BP: 133/66 (04/22/23 0721)  SpO2: 99 % (04/22/23 0800) Vital Signs (24h Range):  Temp:  [97 °F (36.1 °C)-98.2 °F (36.8 °C)] 97.8 °F (36.6 °C)  Pulse:  [] 62  Resp:  [12-20] 12  SpO2:  [97 %-100 %] 99 %  BP: (112-146)/(56-79) 133/66     Weight: 104.8 kg (231 lb)  Body mass index is 42.25 kg/m².    Intake/Output Summary (Last 24 hours) at 4/22/2023 0935  Last data filed at 4/21/2023 1116  Gross per 24 hour   Intake 1000 ml   Output --   Net 1000 ml      Physical Exam  Vitals and nursing note reviewed.   Constitutional:       General: She is not in acute distress.     Appearance: She is well-developed.   HENT:      Head: Normocephalic and atraumatic.   Eyes:      Conjunctiva/sclera: Conjunctivae normal.      Pupils: Pupils are equal, round, and reactive to light.   Neck:      Thyroid: No thyromegaly.   Cardiovascular:      Rate and Rhythm: Normal rate and regular rhythm.      Heart sounds: Normal heart sounds.   Pulmonary:      Effort: Pulmonary effort is normal. No respiratory distress.      Breath sounds: Normal breath sounds. No wheezing.   Abdominal:      General: Bowel sounds are normal.      Palpations: Abdomen is soft.       Tenderness: There is no abdominal tenderness.   Musculoskeletal:         General: Normal range of motion.      Cervical back: Normal range of motion and neck supple.   Lymphadenopathy:      Cervical: No cervical adenopathy.   Skin:     General: Skin is warm and dry.      Findings: No rash.   Neurological:      Mental Status: She is alert and oriented to person, place, and time.   Psychiatric:         Behavior: Behavior normal.       Significant Labs: All pertinent labs within the past 24 hours have been reviewed.    Significant Imaging: I have reviewed all pertinent imaging results/findings within the past 24 hours.    1. No evidence for recent ischemia.  2. Rounded cystic signal abnormality within the left lateral ventricle abutting the septum pellucidum is nonspecific.  Recommend follow-up contrast enhanced MRI of the brain to further assess.   Continue medications, monitoring, FU primary team recommendations. .

## 2023-04-27 NOTE — PROGRESS NOTE ADULT - SUBJECTIVE AND OBJECTIVE BOX
[facility] [phone]        Patient seen and examined in bed.  He was the same   + diarrhea         MEDICATIONS  (STANDING):  atorvastatin 80 milliGRAM(s) Oral at bedtime  carbidopa/levodopa  25/100 2.5 Tablet(s) Oral <User Schedule>  carbidopa/levodopa  25/100 2 Tablet(s) Oral <User Schedule>  chlorhexidine 4% Liquid 1 Application(s) Topical <User Schedule>  cinacalcet 60 milliGRAM(s) Oral daily  dextrose 40% Gel 15 Gram(s) Oral once  dextrose 5%. 1000 milliLiter(s) (50 mL/Hr) IV Continuous <Continuous>  dextrose 5%. 1000 milliLiter(s) (100 mL/Hr) IV Continuous <Continuous>  dextrose 5%. 1000 milliLiter(s) (40 mL/Hr) IV Continuous <Continuous>  dextrose 50% Injectable 25 Gram(s) IV Push once  dextrose 50% Injectable 12.5 Gram(s) IV Push once  dextrose 50% Injectable 25 Gram(s) IV Push once  dextrose 50% Injectable 12.5 Gram(s) IV Push once  dextrose 50% Injectable 25 Gram(s) IV Push once  diVALproex Sprinkle 250 milliGRAM(s) Oral three times a day  DULoxetine 20 milliGRAM(s) Oral daily  folic acid 1 milliGRAM(s) Oral daily  glucagon  Injectable 1 milliGRAM(s) IntraMuscular once  insulin lispro (ADMELOG) corrective regimen sliding scale   SubCutaneous every 6 hours  levothyroxine Injectable 12.5 MICROGram(s) IV Push <User Schedule>  memantine 5 milliGRAM(s) Oral at bedtime  metoprolol tartrate Injectable 5 milliGRAM(s) IV Push every 6 hours  midodrine 10 milliGRAM(s) Oral <User Schedule>  mirtazapine 7.5 milliGRAM(s) Oral daily  Nephro-celeste 1 Tablet(s) Oral daily  pantoprazole Infusion 8 mG/Hr (10 mL/Hr) IV Continuous <Continuous>  QUEtiapine 50 milliGRAM(s) Oral at bedtime  tamsulosin 0.4 milliGRAM(s) Oral at bedtime  trihexyphenidyl 2 milliGRAM(s) Oral three times a day  vancomycin    Solution 125 milliGRAM(s) Oral every 6 hours      VITAL:  T(C): , Max: 36.5 (02-14-22 @ 04:25)  T(F): , Max: 97.7 (02-14-22 @ 04:25)  HR: 115 (02-14-22 @ 11:42)  BP: 108/54 (02-14-22 @ 11:42)  BP(mean): --  RR: 18 (02-14-22 @ 11:42)  SpO2: 98% (02-14-22 @ 11:42)  Wt(kg): --    I and O's:    02-13 @ 07:01  -  02-14 @ 07:00  --------------------------------------------------------  IN: 650 mL / OUT: 300 mL / NET: 350 mL          PHYSICAL EXAM:    Constitutional: NAD  Neck:  No JVD  Respiratory: CTAB/L  Cardiovascular: S1 and S2 + tachycardia   Gastrointestinal: BS+, soft, NT/ND  Extremities: No peripheral edema  Neurological: no focal deficits  Psychiatric: Normal mood, normal affect  : No Javier  Skin: No rashes  Access: Not applicable    LABS:                        6.0    16.23 )-----------( 217      ( 14 Feb 2022 09:04 )             18.2     02-14    128<L>  |  93<L>  |  93<H>  ----------------------------<  145<H>  4.3   |  22  |  3.36<H>    Ca    7.9<L>      14 Feb 2022 09:04  Phos  1.9     02-14  Mg     1.8     02-14            Urine Studies:          RADIOLOGY & ADDITIONAL STUDIES:             H Plasty Text: Given the location of the defect, shape of the defect and the proximity to free margins a H-plasty was deemed most appropriate for repair.  Using a sterile surgical marker, the appropriate advancement arms of the H-plasty were drawn incorporating the defect and placing the expected incisions within the relaxed skin tension lines where possible. The area thus outlined was incised deep to adipose tissue with a #15 scalpel blade. The skin margins were undermined to an appropriate distance in all directions utilizing iris scissors.  The opposing advancement arms were then advanced into place in opposite direction and anchored with interrupted buried subcutaneous sutures.

## 2023-05-24 NOTE — PROGRESS NOTE ADULT - PROBLEM SELECTOR PLAN 1
No care due was identified.  [facility] Embedded Care Due Messages. Reference number: [id number].   5/24/2023 1:41:04 PM CDT   -  - diuresis per nephrology.  - may need HD at some point.

## 2023-06-06 NOTE — PROGRESS NOTE ADULT - PROBLEM SELECTOR PLAN 3
< > 7.5*   HCT 24.4* 24.4* 22.6* 21.5* 26.7* 26.1*   < > 28.1* 24.6*   < > 22.3*   < > 19.5* 23.5*   < > 22.3*   MCV 95.1  --   --  96.5  --  92.8  --  92.0 90.7   < > 90.3   < > 89.8 87.6  --  88.9   MCH 31.5  --   --  31.0  --  31.3  --  30.8 31.0   < > 30.7   < > 29.3 29.4  --  29.7   MCHC 33.1  --   --  32.2  --  33.7  --  33.5 34.1   < > 34.0   < > 32.6 33.5  --  33.4   RDW 19.8*  --   --  19.3*  --  18.0*  --  16.3* 16.3*   < > 16.5*   < > 17.2* 16.5*  --  16.1*     --   --  211  --  139  --  176 136   < > 148   < > 372 146  --  120*    < > = values in this interval not displayed. LDH:  Recent Labs     05/26/23  0402   *         Radiology Review:  CTA LOWER EXTREMITY RIGHT W CONTRAST  Narrative: EXAMINATION:  CTA OF THE RIGHT LOWER EXTREMITY 6/5/2023 3:13 pm    TECHNIQUE:  CTA of the right lower extremity was performed after the administration of  intravenous contrast. Multiplanar reformatted images are provided for review. MIP images are provided for review. . Automated exposure control, iterative  reconstruction, and/or weight based adjustment of the mA/kV was utilized to  reduce the radiation dose to as low as reasonably achievable. COMPARISON:  CTA abdomen and pelvis performed two weeks previously    HISTORY:  ORDERING SYSTEM PROVIDED HISTORY: refractory anemia, post op total hip  TECHNOLOGIST PROVIDED HISTORY:  pre and post contrast imaging through the right thigh  Metal suppression please, pt is s/p right total hip arthroplasty  Unstable refractory anemia  Reason for exam:->refractory anemia, post op total hip  Reason for Exam: total hip 5/18; pt c/o pain in hip, right hip pain  Additional signs and symptoms: refractory anemia  Relevant Medical/Surgical History: per rad, scan cta to knee, then repeat  scan as femur w/;    FINDINGS:    Nonvascular    Right total hip arthroplasty is again detected.   This creates a great deal of  streak artifact limiting evaluation in that Will continue synthroid 50 mcg po daily. Will continue monitoring and FU.  Suggest to FU outpatient in 4-6 weeks to repeat TFTs.  Discussed plan with patient.

## 2023-08-10 NOTE — PHYSICAL THERAPY INITIAL EVALUATION ADULT - WEIGHT-BEARING RESTRICTIONS: GAIT, REHAB EVAL
Assumed care of pt 0700. Pt alert and oriented. Pt VALDES. Pt on room air O2sat>92%. Pt stated he feels nauseous when he eats. Pt given PRN zofran prior to meal.  Pt needs encouragement to eat meals. Pt voids on own up to [facility].   K 2.6-> replace with 40 mEq PO and 40 mEq IVPB. Maintenance fluids switched to Sodium Bicarb. Repeat lab drawn. Potassium 3.0 after initial replacement. 40 mEq PO and 40 mEq IVPB given per Dr. Mini Lyn. VSS.   Report given to Lorri Tate
full weight-bearing
full weight-bearing

## 2023-08-15 NOTE — PROGRESS NOTE ADULT - PROBLEM/PLAN-7
DISPLAY PLAN FREE TEXT
DISPLAY PLAN FREE TEXT
Oncology consult called to  per perfect serv patient add to census.
DISPLAY PLAN FREE TEXT

## 2023-08-16 NOTE — PROGRESS NOTE ADULT - ASSESSMENT
Assessment  DMT2: 71y Male with DM T2 with hyperglycemia, A1C 6.4%, was on insulin at home, now on low-dose basal insulin and coverage, blood sugars are fluctuating within overall acceptable range, no hypoglycemic episodes. Patient is eating partial meals, appears comfortable though confused, restraints for agitation, pulled permacath yesterday.  Hypothyroidism: Patient has no history thyroid disease, was not on any thyroid supplements, subclinical with low-normal FT4, lethargic, started on synthroid 25 mcg po daily, FT4 improved to 1.2.  CHF: on medications, stable, monitored.  HTN: Controlled,  on antihypertensive medications.  Parkinsons: on meds, monitored.  CKD: Monitor labs/BMP      Kelsie Reece MD  Cell: [phone]  Office: [phone]     Assessment  DMT2: 71y Male with DM T2 with hyperglycemia, A1C 6.4%, was on insulin at home, now on low-dose basal insulin and coverage, blood sugars are fluctuating within overall acceptable range, no hypoglycemic episodes.  Patient is eating partial meals, appears comfortable though confused, restraints for agitation, pulled permacath yesterday.  Hypothyroidism: Patient has no history thyroid disease, was not on any thyroid supplements, subclinical with low-normal FT4, lethargic, started on synthroid 25 mcg po daily, FT4 improved to 1.2.  CHF: on medications, stable, monitored.  HTN: Controlled,  on antihypertensive medications.  Parkinsons: on meds, monitored.  CKD: Monitor labs/BMP      Kelsie Reece MD  Cell: [phone]  Office: [phone]   “Initiate CONSTANT OBSERVATION and Contact Provider”

## 2023-10-09 NOTE — PROGRESS NOTE ADULT - ASSESSMENT
10/8/2023  I, Saul Jenkins MD, saw and evaluated the patient. I have discussed the patient with the resident/non-physician practitioner and agree with the resident's/non-physician practitioner's findings, Plan of Care, and MDM as documented in the resident's/non-physician practitioner's note, except where noted. All available labs and Radiology studies were reviewed. I was present for key portions of any procedure(s) performed by the resident/non-physician practitioner and I was immediately available to provide assistance. At this point I agree with the current assessment done in the Emergency Department. I have conducted an independent evaluation of this patient a history and physical is as follows: Patient sent to the emergency department for evaluation treatment of finger laceration. No other areas of injury. X-ray with no evidence of retained foreign body. Wound cleaned and repaired per resident physician note. I provided general supervision for laceration repair. XR finger second digit-index RIGHT   ED Interpretation by Saul Jenkins MD (10/08 1647)   No radiopaque foreign body identified. No fracture. Final Result by Josr Tate MD (10/08 1923)      No acute osseous abnormality.       Workstation performed: [id number]               ED Course         Critical Care Time  Procedures 70yo M w/ CAD (s/p CABG 2019), CKD (unknown stage), DM2, Parkinson's Disease, HTN, depression p/w b/l LE edema found to have ARF and NSTEMI w/ new Aflutter started on heparin gtt and HD. Pt had prolonged hospital course c/b hemorrhagic shock 2/2 RP bleed, COVID. Course further complicated by acute blood loss anemia 2/2 duodenal ulcer w/ active bleeding vessel requiring endoscopic intervention. Pt remains intubated post procedure. Now palliatively extubated and transferred to the PCU for further management.      72yo M w/ CAD (s/p CABG 2019), CKD (unknown stage), DM2, Parkinson's Disease, HTN, depression p/w b/l LE edema found to have ARF and NSTEMI w/ new Aflutter started on heparin gtt and HD. Pt had prolonged hospital course c/b hemorrhagic shock 2/2 RP bleed, COVID. Course further complicated by acute blood loss anemia 2/2 duodenal ulcer w/ active bleeding vessel requiring endoscopic intervention.  Now palliatively extubated and transferred to the PCU for further management.

## 2024-02-25 NOTE — PROGRESS NOTE ADULT - PROBLEM SELECTOR PLAN 1
Will continue current insulin regimen for now. Will continue monitoring FS and FU.  Suggest DC on Tradjenta 5mg daily, discontinue prior hypoglycemic agents/insulin, endo FU 4 weeks. Intact

## 2024-07-03 NOTE — PROGRESS NOTE ADULT - ASSESSMENT
Assessment  DMT2: 71y Male with DM T2 with hyperglycemia, A1C 6.4%, was on insulin at home, now on basal insulin and coverage, blood sugars in overall acceptable range, no hypoglycemias. Patient on tube feeds, pulled out his PEG, planning IR-guided peg replacement.  Hypothyroidism: Patient has no history thyroid disease, was not on any thyroid supplements, subclinical with low-normal FT4, lethargic, on synthroid 25 mcg po daily, repeat TFTs improved and euthyroid.  Hypercalcemia: Started on Sensipar 60mg daily, Ca improving.  CHF: on medications, stable, monitored.  HTN: Controlled,  on antihypertensive medications.  Parkinsons: on meds, monitored.  CKD: Monitor labs/BMP       Assessment  DMT2: 71y Male with DM T2 with hyperglycemia, A1C 6.4%, was on insulin at home, now on basal insulin and coverage, blood sugars in overall acceptable range, no hypoglycemias. Patient on tube feeds,  pulled out his PEG, planning IR-guided peg replacement.  Hypothyroidism: Patient has no history thyroid disease, was not on any thyroid supplements, subclinical with low-normal FT4, lethargic, on synthroid 25 mcg po daily, repeat TFTs improved and euthyroid.  Hypercalcemia: Started on Sensipar 60mg daily, Ca improving.  CHF: on medications, stable, monitored.  HTN: Controlled,  on antihypertensive medications.  Parkinsons: on meds, monitored.  CKD: Monitor labs/BMP       Detail Level: Detailed Quality 358: Patient-Centered Surgical Risk Assessment And Communication: Documentation of patient-specific risk assessment with a risk calculator based on multi-institutional clinical data, the specific risk calculator used, and communication of risk assessment from risk calculator with the patient or family.

## 2024-09-16 NOTE — ED ADULT NURSE NOTE - ATTEMPT TO OOB
no
PAST SURGICAL HISTORY:  H/O hemorrhoidectomy     H/O tubal ligation     S/P      S/P cholecystectomy

## 2025-02-05 NOTE — ED PROVIDER NOTE - MUSCULOSKELETAL NEGATIVE STATEMENT, MLM
full range of motion in all extremities
no back pain, no gout, no musculoskeletal pain, no neck pain, and no weakness.

## 2025-07-02 NOTE — PROGRESS NOTE ADULT - PROBLEM SELECTOR PLAN 1
How Severe Is Your Cyst?: moderate Is This A New Presentation, Or A Follow-Up?: Cyst I Tramaine Ferraro MD have personally  seen and examined the patient today and have noted the findings and formulated the plan of care.  I Tramaine Ferraro MD have personally seen and examined the patient at bedside today at 2 pm

## (undated) DEVICE — SYR ALLIANCE II INFLATION 60ML

## (undated) DEVICE — ATTACH DISTAL DISP

## (undated) DEVICE — SENSOR O2 FINGER ADULT 24/CA

## (undated) DEVICE — FOLEY HOLDER STATLOCK 2 WAY ADULT

## (undated) DEVICE — BALLOON US ENDO

## (undated) DEVICE — PACK IV START WITH CHG

## (undated) DEVICE — SOL INJ NS 0.9% 500ML 2 PORT

## (undated) DEVICE — TUBING SUCTION CONN 6FT STERILE

## (undated) DEVICE — TUBING IV SET GRAVITY 3Y 100" MACRO

## (undated) DEVICE — CATH ELECHMSTAT  INJ 7FR 210CM

## (undated) DEVICE — TUBING SUCTION 20FT

## (undated) DEVICE — Device

## (undated) DEVICE — SUCTION YANKAUER NO CONTROL VENT

## (undated) DEVICE — CATH IV SAFE BC 22G X 1" (BLUE)

## (undated) DEVICE — BITE BLOCK ADULT 20 X 27MM (GREEN)

## (undated) DEVICE — CATH IV SAFE BC 20G X 1.16" (PINK)